# Patient Record
Sex: FEMALE | Race: WHITE | NOT HISPANIC OR LATINO | Employment: OTHER | URBAN - METROPOLITAN AREA
[De-identification: names, ages, dates, MRNs, and addresses within clinical notes are randomized per-mention and may not be internally consistent; named-entity substitution may affect disease eponyms.]

---

## 2017-01-02 ENCOUNTER — HOSPITAL ENCOUNTER (EMERGENCY)
Facility: MEDICAL CENTER | Age: 52
End: 2017-01-02
Attending: EMERGENCY MEDICINE
Payer: COMMERCIAL

## 2017-01-02 VITALS
BODY MASS INDEX: 22.58 KG/M2 | HEART RATE: 73 BPM | DIASTOLIC BLOOD PRESSURE: 84 MMHG | HEIGHT: 64 IN | SYSTOLIC BLOOD PRESSURE: 143 MMHG | TEMPERATURE: 97.5 F | OXYGEN SATURATION: 100 % | WEIGHT: 132.28 LBS | RESPIRATION RATE: 17 BRPM

## 2017-01-02 DIAGNOSIS — M75.82 ROTATOR CUFF TENDINITIS, LEFT: ICD-10-CM

## 2017-01-02 DIAGNOSIS — M75.50 SUBACROMIAL BURSITIS: ICD-10-CM

## 2017-01-02 PROCEDURE — 700101 HCHG RX REV CODE 250: Performed by: EMERGENCY MEDICINE

## 2017-01-02 PROCEDURE — 700111 HCHG RX REV CODE 636 W/ 250 OVERRIDE (IP): Performed by: EMERGENCY MEDICINE

## 2017-01-02 PROCEDURE — 99284 EMERGENCY DEPT VISIT MOD MDM: CPT

## 2017-01-02 PROCEDURE — 20610 DRAIN/INJ JOINT/BURSA W/O US: CPT

## 2017-01-02 RX ORDER — MELOXICAM 7.5 MG/1
7.5 TABLET ORAL DAILY
Status: SHIPPED | COMMUNITY
End: 2018-09-07

## 2017-01-02 RX ORDER — TOPIRAMATE 100 MG/1
200 TABLET, FILM COATED ORAL 2 TIMES DAILY
Status: SHIPPED | COMMUNITY
End: 2018-09-07

## 2017-01-02 RX ORDER — FROVATRIPTAN SUCCINATE 2.5 MG/1
1 TABLET, FILM COATED ORAL PRN
Status: SHIPPED | COMMUNITY
End: 2018-10-11 | Stop reason: SDUPTHER

## 2017-01-02 RX ORDER — RANITIDINE 150 MG/1
300 TABLET ORAL 2 TIMES DAILY
Status: SHIPPED | COMMUNITY
End: 2018-10-11 | Stop reason: SDUPTHER

## 2017-01-02 RX ORDER — TRIAMCINOLONE ACETONIDE 40 MG/ML
40 INJECTION, SUSPENSION INTRA-ARTICULAR; INTRAMUSCULAR ONCE
Status: COMPLETED | OUTPATIENT
Start: 2017-01-02 | End: 2017-01-02

## 2017-01-02 RX ORDER — FERROUS SULFATE 325(65) MG
325 TABLET ORAL DAILY
Status: SHIPPED | COMMUNITY
End: 2018-09-07

## 2017-01-02 RX ORDER — LIDOCAINE HYDROCHLORIDE AND EPINEPHRINE 10; 10 MG/ML; UG/ML
10 INJECTION, SOLUTION INFILTRATION; PERINEURAL ONCE
Status: COMPLETED | OUTPATIENT
Start: 2017-01-02 | End: 2017-01-02

## 2017-01-02 RX ORDER — EPINEPHRINE 0.3 MG/.3ML
0.3 INJECTION SUBCUTANEOUS ONCE
Status: SHIPPED | COMMUNITY
End: 2018-09-26 | Stop reason: CLARIF

## 2017-01-02 RX ORDER — ESTRADIOL 0.5 MG/1
0.5 TABLET ORAL DAILY
Status: SHIPPED | COMMUNITY
End: 2018-09-07

## 2017-01-02 RX ORDER — GABAPENTIN 300 MG/1
300 CAPSULE ORAL 3 TIMES DAILY
Status: SHIPPED | COMMUNITY
End: 2018-09-07

## 2017-01-02 RX ORDER — AMITRIPTYLINE HYDROCHLORIDE 25 MG/1
50 TABLET, FILM COATED ORAL NIGHTLY
Status: SHIPPED | COMMUNITY
End: 2018-09-07

## 2017-01-02 RX ORDER — TIZANIDINE 4 MG/1
4 TABLET ORAL EVERY 6 HOURS PRN
Status: SHIPPED | COMMUNITY
End: 2018-10-11 | Stop reason: SDUPTHER

## 2017-01-02 RX ORDER — PANTOPRAZOLE SODIUM 40 MG/1
40 TABLET, DELAYED RELEASE ORAL
Status: ON HOLD | COMMUNITY
End: 2018-09-28

## 2017-01-02 RX ORDER — CEVIMELINE HYDROCHLORIDE 30 MG/1
30 CAPSULE ORAL 3 TIMES DAILY
Status: SHIPPED | COMMUNITY
End: 2018-10-11 | Stop reason: SDUPTHER

## 2017-01-02 RX ADMIN — TRIAMCINOLONE ACETONIDE 40 MG: 40 INJECTION, SUSPENSION INTRA-ARTICULAR; INTRAMUSCULAR at 16:30

## 2017-01-02 RX ADMIN — LIDOCAINE HYDROCHLORIDE AND EPINEPHRINE 10 ML: 10; 10 INJECTION, SOLUTION INFILTRATION; PERINEURAL at 16:30

## 2017-01-02 ASSESSMENT — PAIN SCALES - GENERAL: PAINLEVEL_OUTOF10: 3

## 2017-01-02 NOTE — LETTER
"  FORM C-4:  EMPLOYEE’S CLAIM FOR COMPENSATION/ REPORT OF INITIAL TREATMENT  EMPLOYEE’S CLAIM - PROVIDE ALL INFORMATION REQUESTED   First Name  Donna Last Name  Poncho Birthdate             Age  1965 51 y.o. Sex  female Claim Number  [id number]   Home Employee Address  [address]  [place] [facility]                                     Zip  [address] Height  1.626 m (5' 4\") Weight  60 kg (132 lb 4.4 oz) [facility]     Mailing Employee Address                           [address]   [place] [facility]               Zip  [address] Telephone  [phone] (home)  Primary Language Spoken  ENGLISH   Insurer  [employer] [facility]  [address]  [place] [address] Third Party   [place] [employer] [facility] Employee's Occupation (Job Title) When Injury or Occupational Disease Occurred     Employer's Name  [employer]   Telephone   [phone]   Employer Address  [address]   [facility] Zip  [address]   Date of Injury  9/8/1999       Hour of Injury  2:00 PM Date Employer Notified  9/8/1999 Last Day of Work after Injury or Occupational Disease  5/3/2003 Supervisor to Whom Injury Reported  Mariza Ortiz   Address or Location of Accident (if applicable)  [[facility]]   What were you doing at the time of accident? (if applicable)  Placing a heavy box over head    How did this injury or occupational disease occur? Be specific and answer in detail. Use additional sheet if necessary)  Placing a heavy box over head   If you believe that you have an occupational disease, when did you first have knowledge of the disability and it relationship to your employment?  N/A Witnesses to the Accident  Racheletayler Mosqueda     Nature of Injury or Occupational Disease  Workers' Compensation  Part(s) of Body Injured or Affected  Spinal Cord - Neck, Shoulder (L), Shoulder (R)    I certify that the above is true and correct to the best of my knowledge and that I " have provided this information in order to obtain the benefits of [place]  I hereby authorize any physician, chiropractor, surgeon, practitioner, or other person, any hospital, including [facility] or [facility], any medical service organization, any insurance company, or other institution or organization to release to each other, any medical or other information, including benefits paid or payable, pertinent to this injury or disease, except information relative to diagnosis, treatment and/or counseling for AIDS, psychological conditions, alcohol or controlled substances, for which I must give specific authorization.  A Photostat of this authorization shall be as valid as the original.   Date  Jan 2, 2017 Place  [facility] Employee’s Signature   THIS REPORT MUST BE COMPLETED AND MAILED WITHIN 3 WORKING DAYS OF TREATMENT   Place  [facility], EMERGENCY DEPT  Name of Facility   [facility]   Date  1/2/2017 Diagnosis  (M75.82) Rotator cuff tendinitis, left  (M75.50) Subacromial bursitis Is there evidence the injured employee was under the influence of alcohol and/or another controlled substance at the time of accident?   Hour  5:32 PM Description of Injury or Disease  Rotator cuff tendinitis, left  Subacromial bursitis No   Treatment  Lidocaine, kenalog subacromial injection  sling  Have you advised the patient to remain off work five days or more?         No   X-Ray Findings  Negative  Comments:None.  MRI and xrays already completed in [facility]   If Yes   From Date    To Date      From information given by the employee, together with medical evidence, can you directly connect this injury or occupational disease as job incurred?    Comments:Unknown.  Adilia is retired and was not working when pain returned. If No, is the employee capable of: Full  "Duty  Yes Modified Duty      Is additional medical care by a physician indicated?  Yes If Modified Duty, Specify any Limitations / Restrictions        Do you know of any previous injury or disease contributing to this condition or occupational disease?  No   Date  1/2/2017 Print Doctor’s Name  Cipriano Larson certify the employer’s copy of this form was mailed on:   Address  [address] [place] [id number] [phone] Insurer’s Use Only   [facility]  [id number]    Provider’s Tax ID Number  [id number] Telephone  Dept: [phone]    Doctor’s Signature  e-CIPRIANO Sy M.D. Degree   MD    Original - TREATING PHYSICIAN OR CHIROPRACTOR   Pg 2-Insurer/TPA   Pg 3-Employer   Pg 4-Employee                                                                                                  Form C-4 (rev01/03)     BRIEF DESCRIPTION OF RIGHTS AND BENEFITS  (Pursuant to [place]RS 616C.050)    Notice of Injury or Occupational Disease (Incident Report Form C-1): If an injury or occupational disease (OD) arises out of and in the course of employment, you must provide written notice to your employer as soon as practicable, but no later than 7 days after the accident or OD. Your employer shall maintain a sufficient supply of the required forms.    Claim for Compensation (Form C-4): If medical treatment is sought, the form C-4 is available at the place of initial treatment. A completed \"Claim for Compensation\" (Form C-4) must be filed within 90 days after an accident or OD. The treating physician or chiropractor must, within 3 working days after treatment, complete and mail to the employer, the employer's insurer and third-party , the Claim for Compensation.    Medical Treatment: If you require medical treatment for your on-the-job injury or OD, you may be required to select a physician or chiropractor from a list provided by your workers’ compensation insurer, if it has contracted with an " Organization for Managed Care (MCO) or Preferred Provider Organization (PPO) or providers of health care. If your employer has not entered into a contract with an MCO or PPO, you may select a physician or chiropractor from the Panel of Physicians and Chiropractors. Any medical costs related to your industrial injury or OD will be paid by your insurer.    Temporary Total Disability (TTD): If your doctor has certified that you are unable to work for a period of at least 5 consecutive days, or 5 cumulative days in a 20-day period, or places restrictions on you that your employer does not accommodate, you may be entitled to TTD compensation.    Temporary Partial Disability (TPD): If the wage you receive upon reemployment is less than the compensation for TTD to which you are entitled, the insurer may be required to pay you TPD compensation to make up the difference. TPD can only be paid for a maximum of 24 months.    Permanent Partial Disability (PPD): When your medical condition is stable and there is an indication of a PPD as a result of your injury or OD, within 30 days, your insurer must arrange for an evaluation by a rating physician or chiropractor to determine the degree of your PPD. The amount of your PPD award depends on the date of injury, the results of the PPD evaluation and your age and wage.    Permanent Total Disability (PTD): If you are medically certified by a treating physician or chiropractor as permanently and totally disabled and have been granted a PTD status by your insurer, you are entitled to receive monthly benefits not to exceed 66 2/3% of your average monthly wage. The amount of your PTD payments is subject to reduction if you previously received a PPD award.    Vocational Rehabilitation Services: You may be eligible for vocational rehabilitation services if you are unable to return to the job due to a permanent physical impairment or permanent restrictions as a result of your injury or  occupational disease.    Transportation and Per Elise Reimbursement: You may be eligible for travel expenses and per elise associated with medical treatment.  Reopening: You may be able to reopen your claim if your condition worsens after claim closure.    Appeal Process: If you disagree with a written determination issued by the insurer or the insurer does not respond to your request, you may appeal to the Department of Administration, , by following the instructions contained in your determination letter. You must appeal the determination within 70 days from the date of the determination letter at [address], [address], [place], [place] [address], or [address], [address], [place], [place] [address]. If you disagree with the  decision, you may appeal to the Department of Administration, . You must file your appeal within 30 days from the date of the  decision letter at [address], [address], [place], [place] [address], or [address], [place], [place] [address]. If you disagree with a decision of an , you may file a petition for judicial review with the District Court. You must do so within 30 days of the Appeal Officer’s decision. You may be represented by an  at your own expense or you may contact the [facility] for possible representation.    [place]  for Injured Workers (NAIW): If you disagree with a  decision, you may request that NAIW represent you without charge at an  Hearing. For information regarding denial of benefits, you may contact the [facility] at: [address], [address], [place] [address], [phone]) [phone], or [address], [place] [address], [phone]    To File a Complaint with the Division: If you wish to file a complaint with the  of the Division of Industrial Relations (DIR), please contact  the Workers’ Compensation Section, [address], [address], [place], [place] [address], telephone (954) 783-1783, or [address], [address], [place], [place] [address], telephone [phone]) [phone].    For assistance with Workers’ Compensation Issues: you may contact the Office of the [facility] Consumer Health Assistance, [address] [place] [address], [address], [place], [place] [address], Toll Free 1-159.117.6940, Web site: [email or web address].[email or web address]., E-mail [email or web address].                                                                                                                                                                               __________________________________________________________________                                    _______________            Employee Name / Signature                                                                                                                            Date                                       D-2 (rev. 10/07)

## 2017-01-02 NOTE — LETTER
January 29, 2019    To Whom It May Concern:         This is confirmation that Poncho Engel date of birth 1965  attended her scheduled appointment on 1/28/2019 for medical clearance for scheduled left-sided shoulder surgery.    -Reviewed patient's medical records, patient has history of stress cardiomyopathy, chronic kidney disease, congestive heart failure.  Most recent stress test on 1/22/2019 is normal and echocardiogram of the heart showed normal EF. Patient has been recently seen by Nephrologist for CKD stage 3. Most recent labs showed normal BUN/creatinine, GFR is 53.  Discussed with patient regarding risks benefits of the surgery, verbalized understanding.  Patient is medically cleared and is a low risk for surgery.         If you have any questions please do not hesitate to call me at the phone number listed below.    Sincerely,          Dr.Deepthi Morales  [address] [place] [address]. [address] [place] [address], [place] [address]

## 2017-01-02 NOTE — ED AVS SNAPSHOT
logolineup Access Code: [email or web address]  Expires: 2/1/2017  5:42 PM    logolineup  A secure, online tool to manage your health information     [employer]’s logolineup® is a secure, online tool that connects you to your personalized health information from the privacy of your home -- day or night - making it very easy for you to manage your healthcare. Once the activation process is completed, you can even access your medical information using the logolineup alanis, which is available for free in the Apple Alanis store or Google Play store.     logolineup provides the following levels of access (as shown below):   My Chart Features   [facility] Primary Care Doctor [facility]  Specialists [facility]  Urgent  Care Non-[facility]  Primary Care  Doctor   Email your healthcare team securely and privately 24/7 X X X X   Manage appointments: schedule your next appointment; view details of past/upcoming appointments X      Request prescription refills. X      View recent personal medical records, including lab and immunizations X X X X   View health record, including health history, allergies, medications X X X X   Read reports about your outpatient visits, procedures, consult and ER notes X X X X   See your discharge summary, which is a recap of your hospital and/or ER visit that includes your diagnosis, lab results, and care plan. X X       How to register for logolineup:  1. Go to  [email or web address].  2. Click on the Sign Up Now box, which takes you to the New Member Sign Up page. You will need to provide the following information:  a. Enter your logolineup Access Code exactly as it appears at the top of this page. (You will not need to use this code after you’ve completed the sign-up process. If you do not sign up before the expiration date, you must request a new code.)   b. Enter your date of birth.   c. Enter your home email address.   d. Click Submit, and follow the next screen’s instructions.  3. Create a logolineup ID. This will be your logolineup  login ID and cannot be changed, so think of one that is secure and easy to remember.  4. Create a Quture password. You can change your password at any time.  5. Enter your Password Reset Question and Answer. This can be used at a later time if you forget your password.   6. Enter your e-mail address. This allows you to receive e-mail notifications when new information is available in Quture.  7. Click Sign Up. You can now view your health information.    For assistance activating your Quture account, call [phone]

## 2017-01-02 NOTE — ED NOTES
Presents complaining of exacerbation of chronic left arm pain and neck pain.  Seen by her PCP for same. MRI results are pending.

## 2017-01-02 NOTE — ED AVS SNAPSHOT
1/2/2017          Donna Isaac   [address]  [place] [address]    Dear Donna:    [facility] wants to ensure your discharge home is safe and you or your loved ones have had all your questions answered regarding your care after you leave the hospital.    You may receive a telephone call within two days of your discharge.  This call is to make certain you understand your discharge instructions as well as ensure we provided you with the best care possible during your stay with us.     The call will only last approximately 3-5 minutes and will be done by a nurse.    Once again, we want to ensure your discharge home is safe and that you have a clear understanding of any next steps in your care.  If you have any questions or concerns, please do not hesitate to contact us, we are here for you.  Thank you for choosing [facility] for your healthcare needs.    Sincerely,    Rubens Arroyo    [facility]

## 2017-01-02 NOTE — ED AVS SNAPSHOT
After Visit Summary                                                                                                                Donna Isaac   MRN: [id number]    Department:  [facility], Emergency Dept   Date of Visit:  1/2/2017            [facility], Emergency Dept    [address]    [place] [address]    Phone:  [phone]      You were seen by     Benson Larson M.D.      Your Diagnosis Was     Rotator cuff tendinitis, left     M75.82       These are the medications you received during your hospitalization from 01/02/2017 1236 to 01/02/2017 1742     Date/Time Order Dose Route Action    01/02/2017 1630 triamcinolone acetonide (KENALOG-40) injection 40 mg 40 mg Intramuscular Given    01/02/2017 1630 lidocaine-epinephrine 1 %-1:[id number] injection 10 mL 10 mL Injection Given      Follow-up Information     1. Schedule an appointment as soon as possible for a visit with Your Physician.    Specialty:  Emergency Medicine    Why:  As needed    Contact information    Varies        Medication Information     Review all of your home medications and newly ordered medications with your primary doctor and/or pharmacist as soon as possible. Follow medication instructions as directed by your doctor and/or pharmacist.     Please keep your complete medication list with you and share with your physician. Update the information when medications are discontinued, doses are changed, or new medications (including over-the-counter products) are added; and carry medication information at all times in the event of emergency situations.               Medication List      ASK your doctor about these medications        Instructions    amitriptyline 25 MG Tabs   Commonly known as:  ELAVIL    Take 50 mg by mouth every evening.   Dose:  50 mg       aspirin EC 81 MG Tbec   Commonly known as:  ECOTRIN    Take 162 mg by mouth every evening.   Dose:  162 mg       CALCIUM SOFT CHEWS PO    Take 1 Tab by mouth every day.   Dose:  1 Tab       cevimeline 30 MG capsule   Commonly known as:  EVOXAC    Take 30 mg by mouth 3 times a day.   Dose:  30 mg       EPIPEN 2-JOSSE 0.3 MG/0.3ML Soaj solution for injection   Generic drug:  EPINEPHrine    0.3 mg by Intramuscular route Once.   Dose:  0.3 mg       estradiol 0.5 MG tablet   Commonly known as:  ESTRACE    Take 0.5 mg by mouth every day.   Dose:  0.5 mg       ferrous sulfate 325 (65 FE) MG tablet    Take 325 mg by mouth every day.   Dose:  325 mg       FROVA 2.5 MG Tabs   Generic drug:  Frovatriptan Succinate    Take 1 Tab by mouth as needed. Indications: Migraine Headache   Dose:  1 Tab       gabapentin 300 MG Caps   Commonly known as:  NEURONTIN    Take 300 mg by mouth 3 times a day.   Dose:  300 mg       meloxicam 7.5 MG Tabs   Commonly known as:  MOBIC    Take 7.5 mg by mouth every day.   Dose:  7.5 mg       multivitamin Tabs    Take 1 Tab by mouth every day.   Dose:  1 Tab       pantoprazole 40 MG Tbec   Commonly known as:  PROTONIX    Take 40 mg by mouth every bedtime.   Dose:  40 mg       ranitidine 150 MG Tabs   Commonly known as:  ZANTAC    Take 150 mg by mouth every day.   Dose:  150 mg       tizanidine 4 MG Tabs   Commonly known as:  ZANAFLEX    Take 8 mg by mouth every bedtime.   Dose:  8 mg       topiramate 100 MG Tabs   Commonly known as:  TOPAMAX    Take 200 mg by mouth 2 times a day.   Dose:  200 mg       VITAMIN D PO    Take 1 Cap by mouth every day.   Dose:  1 Cap               Procedures and tests performed during your visit     SLING        Discharge Instructions       Rotator Cuff Tendinitis  Continue mobic. Ice the shoulder. Use the sling up to 3 days. After 3 days start range of motion exercises as discussed. Follow up with her physician during business hours to obtain the results of your MRI.     Rotator cuff tendinitis is inflammation of the tough, cord-like bands that connect muscle to bone (tendons) in your rotator cuff. Your  rotator cuff is the collection of all the muscles and tendons that connect your arm to your shoulder. Your rotator cuff holds the head of your upper arm bone (humerus) in the cup (fossa) of your shoulder blade (scapula).  CAUSES  Rotator cuff tendinitis is usually caused by overusing the joint involved.   SIGNS AND SYMPTOMS  · Deep ache in the shoulder also felt on the outside upper arm over the shoulder muscle.  · Point tenderness over the area that is injured.  · Pain comes on gradually and becomes worse with lifting the arm to the side (abduction) or turning it inward (internal rotation).  · May lead to a chronic tear: When a rotator cuff tendon becomes inflamed, it runs the risk of losing its blood supply, causing some tendon fibers to die. This increases the risk that the tendon can fray and partially or completely tear.  DIAGNOSIS  Rotator cuff tendinitis is diagnosed by taking a medical history, performing a physical exam, and reviewing results of imaging exams. The medical history is useful to help determine the type of rotator cuff injury. The physical exam will include looking at the injured shoulder, feeling the injured area, and watching you do range-of-motion exercises. X-ray exams are typically done to rule out other causes of shoulder pain, such as fractures. MRI is the imaging exam usually used for significant shoulder injuries. Sometimes a dye study called CT arthrogram is done, but it is not as widely used as MRI. In some institutions, special ultrasound tests may also be used to aid in the diagnosis.  TREATMENT   Less Severe Cases  · Use of a sling to rest the shoulder for a short period of time. Prolonged use of the sling can cause stiffness, weakness, and loss of motion of the shoulder joint.  · Anti-inflammatory medicines, such as ibuprofen or naproxen sodium, may be prescribed.  More Severe Cases  · Physical therapy.  · Use of steroid injections into the shoulder joint.  · Surgery.  HOME CARE  INSTRUCTIONS   · Use a sling or splint until the pain decreases. Prolonged use of the sling can cause stiffness, weakness, and loss of motion of the shoulder joint.  · Apply ice to the injured area:  ¨ Put ice in a plastic bag.  ¨ Place a towel between your skin and the bag.  ¨ Leave the ice on for 20 minutes, 2-3 times a day.  · Try to avoid use other than gentle range of motion while your shoulder is painful. Use the shoulder and exercise only as directed by your health care provider. Stop exercises or range of motion if pain or discomfort increases, unless directed otherwise by your health care provider.  · Only take over-the-counter or prescription medicines for pain, discomfort, or fever as directed by your health care provider.  · If you were given a shoulder sling and straps (immobilizer), do not remove it except as directed, or until you see a health care provider for a follow-up exam. If you need to remove it, move your arm as little as possible or as directed.  · You may want to sleep on several pillows at night to lessen swelling and pain.  SEEK IMMEDIATE MEDICAL CARE IF:   · Your shoulder pain increases or new pain develops in your arm, hand, or fingers and is not relieved with medicines.  · You have new, unexplained symptoms, especially increased numbness in the hands or loss of strength.  · You develop any worsening of the problems that brought you in for care.  · Your arm, hand, or fingers are numb or tingling.  · Your arm, hand, or fingers are swollen, painful, or turn white or blue.  MAKE SURE YOU:  · Understand these instructions.  · Will watch your condition.  · Will get help right away if you are not doing well or get worse.     This information is not intended to replace advice given to you by your health care provider. Make sure you discuss any questions you have with your health care provider.     Document Released: 03/09/2005 Document Revised: 01/08/2016 Document Reviewed:  07/30/2014  Elsevier Interactive Patient Education ©2016 [employer]            Patient Information     Patient Information    Following emergency treatment: all patient requiring follow-up care must return either to a private physician or a clinic if your condition worsens before you are able to obtain further medical attention, please return to the emergency room.     Billing Information    At [facility], we work to make the billing process streamlined for our patients.  Our Representatives are here to answer any questions you may have regarding your hospital bill.  If you have insurance coverage and have supplied your insurance information to us, we will submit a claim to your insurer on your behalf.  Should you have any questions regarding your bill, we can be reached online or by phone as follows:  Online: You are able pay your bills online or live chat with our representatives about any billing questions you may have. We are here to help Monday - Friday from 8:00am to 7:30pm and 9:00am - 12:00pm on Saturdays.  Please visit [email or web address]  for more information.   Phone:  [phone] or [phone]    Please note that your emergency physician, surgeon, pathologist, radiologist, anesthesiologist, and other specialists are not employed by [facility] and will therefore bill separately for their services.  Please contact them directly for any questions concerning their bills at the numbers below:     Emergency Physician Services:  [phone]  [facility]:  [phone]  [facility]:  [phone]  [facility]:  [phone]    1. Your final bill may vary from the amount quoted upon discharge if all procedures are not complete at that time, or if your doctor has additional procedures of which we are not aware. You will receive an additional bill if you return to the Emergency Department at [facility] for suture removal  regardless of the facility of which the sutures were placed.     2. Please arrange for settlement of this account at the emergency registration.    3. All self-pay accounts are due in full at the time of treatment.  If you are unable to meet this obligation then payment is expected within 4-5 days.     4. If you have had radiology studies (CT, X-ray, Ultrasound, MRI), you have received a preliminary result during your emergency department visit. Please contact the radiology department [phone] to receive a copy of your final result. Please discuss the Final result with your primary physician or with the follow up physician provided.     Crisis Hotline:  [place] Crisis Hotline:  4-472-HCUKNTF or 1-985.153.2945  [place] Crisis Hotline:    1-275.336.9011 or 957-264-8490         ED Discharge Follow Up Questions    1. In order to provide you with very good care, we would like to follow up with a phone call in the next few days.  May we have your permission to contact you?     YES /  NO    2. What is the best phone number to call you? (       )_____-__________    3. What is the best time to call you?      Morning  /  Afternoon  /  Evening                   Patient Signature:  ____________________________________________________________    Date:  ____________________________________________________________

## 2017-01-03 NOTE — ED NOTES
"Pt has chronic pain to L shoulder/neck from workplace injury in 1999. Reports waking up with \"frozen L shoulder\", pain has been steadily increasing since. Denies numbness, tingling, +distal csm. Pt's L shoulder ROM has increased following injection.   "

## 2017-01-03 NOTE — ED NOTES
Patient verbalized understanding of discharge instructions including order not to drive today, no questions at this time. Pt to follow up with PCP to discuss BP. VS stable, patient will ambulate to exit with d/c instructions in hand.

## 2017-01-03 NOTE — ED NOTES
Med Rec completed per patient  Allergies reviewed  No ORAL antibiotics in last 30 days    Patient used her Epipen 12-31-16, not sure what she was allergic to

## 2017-01-03 NOTE — ED NOTES
Pt dressed, sling applied, +distals csm before and after sling applied. Pt updated we are awaiting workmans comp paperwork to be completed for d/c.

## 2017-01-03 NOTE — DISCHARGE INSTRUCTIONS
Rotator Cuff Tendinitis  Continue mobic. Ice the shoulder. Use the sling up to 3 days. After 3 days start range of motion exercises as discussed. Follow up with her physician during business hours to obtain the results of your MRI.     Rotator cuff tendinitis is inflammation of the tough, cord-like bands that connect muscle to bone (tendons) in your rotator cuff. Your rotator cuff is the collection of all the muscles and tendons that connect your arm to your shoulder. Your rotator cuff holds the head of your upper arm bone (humerus) in the cup (fossa) of your shoulder blade (scapula).  CAUSES  Rotator cuff tendinitis is usually caused by overusing the joint involved.   SIGNS AND SYMPTOMS  · Deep ache in the shoulder also felt on the outside upper arm over the shoulder muscle.  · Point tenderness over the area that is injured.  · Pain comes on gradually and becomes worse with lifting the arm to the side (abduction) or turning it inward (internal rotation).  · May lead to a chronic tear: When a rotator cuff tendon becomes inflamed, it runs the risk of losing its blood supply, causing some tendon fibers to die. This increases the risk that the tendon can fray and partially or completely tear.  DIAGNOSIS  Rotator cuff tendinitis is diagnosed by taking a medical history, performing a physical exam, and reviewing results of imaging exams. The medical history is useful to help determine the type of rotator cuff injury. The physical exam will include looking at the injured shoulder, feeling the injured area, and watching you do range-of-motion exercises. X-ray exams are typically done to rule out other causes of shoulder pain, such as fractures. MRI is the imaging exam usually used for significant shoulder injuries. Sometimes a dye study called CT arthrogram is done, but it is not as widely used as MRI. In some institutions, special ultrasound tests may also be used to aid in the diagnosis.  TREATMENT   Less Severe  Cases  · Use of a sling to rest the shoulder for a short period of time. Prolonged use of the sling can cause stiffness, weakness, and loss of motion of the shoulder joint.  · Anti-inflammatory medicines, such as ibuprofen or naproxen sodium, may be prescribed.  More Severe Cases  · Physical therapy.  · Use of steroid injections into the shoulder joint.  · Surgery.  HOME CARE INSTRUCTIONS   · Use a sling or splint until the pain decreases. Prolonged use of the sling can cause stiffness, weakness, and loss of motion of the shoulder joint.  · Apply ice to the injured area:  ¨ Put ice in a plastic bag.  ¨ Place a towel between your skin and the bag.  ¨ Leave the ice on for 20 minutes, 2-3 times a day.  · Try to avoid use other than gentle range of motion while your shoulder is painful. Use the shoulder and exercise only as directed by your health care provider. Stop exercises or range of motion if pain or discomfort increases, unless directed otherwise by your health care provider.  · Only take over-the-counter or prescription medicines for pain, discomfort, or fever as directed by your health care provider.  · If you were given a shoulder sling and straps (immobilizer), do not remove it except as directed, or until you see a health care provider for a follow-up exam. If you need to remove it, move your arm as little as possible or as directed.  · You may want to sleep on several pillows at night to lessen swelling and pain.  SEEK IMMEDIATE MEDICAL CARE IF:   · Your shoulder pain increases or new pain develops in your arm, hand, or fingers and is not relieved with medicines.  · You have new, unexplained symptoms, especially increased numbness in the hands or loss of strength.  · You develop any worsening of the problems that brought you in for care.  · Your arm, hand, or fingers are numb or tingling.  · Your arm, hand, or fingers are swollen, painful, or turn white or blue.  MAKE SURE YOU:  · Understand these  instructions.  · Will watch your condition.  · Will get help right away if you are not doing well or get worse.     This information is not intended to replace advice given to you by your health care provider. Make sure you discuss any questions you have with your health care provider.     Document Released: 03/09/2005 Document Revised: 01/08/2016 Document Reviewed: 07/30/2014  [employer] Interactive Patient Education ©2016 [employer] Inc[employer]

## 2017-01-03 NOTE — ED PROVIDER NOTES
"ED Provider Note    CHIEF COMPLAINT   Chief Complaint   Patient presents with   • Shoulder Pain   • Neck Pain   • Headache       HPI   Donna Isaac is a 51 y.o. female who presents for severe left shoulder pain for the last 3 days with greatly decreased range of motion. Patient had pain for 3-4 months and that it worsened without trauma. She had an MRI in [facility] on 23 December of both shoulders but does not know the results. She will not see this orthopedist until she returns from [place] in 6 months. No weakness or numbness. The pain does radiate now towards the neck and causes a headache. Remote work-related injury and left rotator cuff repair and right shoulder surgery as well. Patient had recent normal x-rays of the shoulder. She has not had a steroid injection.    REVIEW OF SYSTEMS   Pertinent positives: To be her left shoulder pain and decreased range of motion.  Pertinent negatives: Weakness, numbness.    PAST MEDICAL HISTORY   Prior remote left rotator cuff injury work-related    PAST SURGICAL HISTORY  Bilateral shoulder surgery    CURRENT MEDICATIONS   Mode pick daily low-dose and tolerates no other anti-inflammatories.    ALLERGIES   Allergies   Allergen Reactions   • Bee Venom Anaphylaxis   • Contrast Media With Iodine [Iodine] Shortness of Breath   • Econazole Anaphylaxis   • Floxin [Ofloxacin] Anaphylaxis   • Keflex Shortness of Breath   • Levaquin Shortness of Breath   • Nitrofurantoin Shortness of Breath     ...and hives     • Oxycodone Shortness of Breath     ...and hives     • Penicillins Shortness of Breath     ...and hives     • Ancef [Cefazolin] Hives   • Bactrim [Sulfamethoxazole W-Trimethoprim] Shortness of Breath   • Bextra [Valdecoxib] Rash     \"Skin burn and peeling.\"   • Other Drug Hives     \"Enconazole nitrate.\" shortness of breath and hives   • Other Misc Anaphylaxis     Adhesive tape: blisters   • Morphine Anaphylaxis   • Norco [Apap-Fd&C Yellow #10 Al Tai-Hydrocodone] " "Shortness of Breath     ...and hives         PHYSICAL EXAM  VITAL SIGNS: /63 mmHg  Pulse 80  Temp(Src) 36.4 °C (97.5 °F)  Resp 18  Ht 1.626 m (5' 4\")  Wt 60 kg (132 lb 4.4 oz)  BMI 22.69 kg/m2  SpO2 100%  Constitutional: Well developed, Well nourished, well appearing.  HENT: Atraumatic.   Cardiovascular:  Peripheral pulses are 2+.  Skin: No rash.   Musculoskeletal: Tenderness around the glenohumeral joint consistent with rotator cuff pathology. Greatly decreased range of motion in abduction and internal and external rotation. No tenderness of the clavicle cervical spine. No pain with axial loading of the cervical spine.  Compartments: Soft  Neurologic: Wrist extension, flexion, finger abduction, and thumb opposition, biceps, triceps and shoulder abduction are 5/5 bilateral.    Sensation is intact on the pad of the first and fifth finger, over the first dorsal web space of the hand, And over the deltoid.         RADIOLOGY/PROCEDURES  We attempted to obtain MRI results of the shoulder but the imaging center was closed.    COURSE & MEDICAL DECISION MAKING  Seizure: Subacromial bursa injection with triamcinolone. Skin was prepped with an alcohol wipe. The skin was anesthetized with 1-1/2 mL 1% lidocaine with epinephrine. 40 mg triamcinolone and 7 mL of 1% lidocaine with epinephrine were instilled in the subacromial space. Patient tolerated procedure well.    Hearing patient presents with a left rotator cuff tendinitis with a probable subacromial bursitis. I doubt this is a radicular pain from the neck. The patient is artery had normal radiographs and no history of injury.    This patient has borderline or elevated blood pressure as recorded above and was instructed to followup with primary physician for comprehensive blood pressure evaluation and yearly fasting cholesterol assessment according to to CMS protocol.      PLAN:  Sling for 3 days  Range of motion exercises  Rotator cuff tendinitis " handout  Continue motor vehicle  [place]  Follow-up with your physician for MRI results and further care as needed  Worker's compensation form completed since patient reported a left shoulder injury work-related many years ago    CONDITION: good    FINAL IMPRESSION  1. Rotator cuff tendinitis, left    2. Subacromial bursitis    3.      Right subacromial bursa injection        Electronically signed by: Benson Larson, 1/2/2017 4:02 PM

## 2018-09-07 ENCOUNTER — APPOINTMENT (OUTPATIENT)
Dept: RADIOLOGY | Facility: MEDICAL CENTER | Age: 53
End: 2018-09-07
Attending: EMERGENCY MEDICINE
Payer: MEDICARE

## 2018-09-07 ENCOUNTER — HOSPITAL ENCOUNTER (EMERGENCY)
Facility: MEDICAL CENTER | Age: 53
End: 2018-09-07
Attending: EMERGENCY MEDICINE
Payer: MEDICARE

## 2018-09-07 VITALS
HEART RATE: 85 BPM | HEIGHT: 64 IN | RESPIRATION RATE: 16 BRPM | OXYGEN SATURATION: 93 % | BODY MASS INDEX: 30.11 KG/M2 | SYSTOLIC BLOOD PRESSURE: 184 MMHG | WEIGHT: 176.37 LBS | DIASTOLIC BLOOD PRESSURE: 129 MMHG | TEMPERATURE: 97.8 F

## 2018-09-07 DIAGNOSIS — R11.2 NON-INTRACTABLE VOMITING WITH NAUSEA, UNSPECIFIED VOMITING TYPE: ICD-10-CM

## 2018-09-07 DIAGNOSIS — R10.33 PERIUMBILICAL ABDOMINAL PAIN: ICD-10-CM

## 2018-09-07 LAB
ABO GROUP BLD: NORMAL
ABO GROUP BLD: NORMAL
ALBUMIN SERPL BCP-MCNC: 3.9 G/DL (ref 3.2–4.9)
ALBUMIN/GLOB SERPL: 1.5 G/DL
ALP SERPL-CCNC: 68 U/L (ref 30–99)
ALT SERPL-CCNC: 21 U/L (ref 2–50)
ANION GAP SERPL CALC-SCNC: 10 MMOL/L (ref 0–11.9)
APPEARANCE UR: CLEAR
AST SERPL-CCNC: 29 U/L (ref 12–45)
BASOPHILS # BLD AUTO: 0.8 % (ref 0–1.8)
BASOPHILS # BLD: 0.04 K/UL (ref 0–0.12)
BILIRUB SERPL-MCNC: 0.6 MG/DL (ref 0.1–1.5)
BILIRUB UR QL STRIP.AUTO: NEGATIVE
BLD GP AB SCN SERPL QL: NORMAL
BUN SERPL-MCNC: 17 MG/DL (ref 8–22)
CALCIUM SERPL-MCNC: 9.1 MG/DL (ref 8.4–10.2)
CHLORIDE SERPL-SCNC: 103 MMOL/L (ref 96–112)
CO2 SERPL-SCNC: 27 MMOL/L (ref 20–33)
COLOR UR: YELLOW
CREAT SERPL-MCNC: 0.94 MG/DL (ref 0.5–1.4)
EKG IMPRESSION: NORMAL
EOSINOPHIL # BLD AUTO: 0.15 K/UL (ref 0–0.51)
EOSINOPHIL NFR BLD: 3 % (ref 0–6.9)
ERYTHROCYTE [DISTWIDTH] IN BLOOD BY AUTOMATED COUNT: 45.6 FL (ref 35.9–50)
GLOBULIN SER CALC-MCNC: 2.6 G/DL (ref 1.9–3.5)
GLUCOSE SERPL-MCNC: 105 MG/DL (ref 65–99)
GLUCOSE UR STRIP.AUTO-MCNC: NEGATIVE MG/DL
HCT VFR BLD AUTO: 43.5 % (ref 37–47)
HGB BLD-MCNC: 14.4 G/DL (ref 12–16)
IMM GRANULOCYTES # BLD AUTO: 0.01 K/UL (ref 0–0.11)
IMM GRANULOCYTES NFR BLD AUTO: 0.2 % (ref 0–0.9)
INR PPP: 0.95 (ref 0.87–1.13)
KETONES UR STRIP.AUTO-MCNC: NEGATIVE MG/DL
LEUKOCYTE ESTERASE UR QL STRIP.AUTO: NEGATIVE
LIPASE SERPL-CCNC: 31 U/L (ref 7–58)
LYMPHOCYTES # BLD AUTO: 1.21 K/UL (ref 1–4.8)
LYMPHOCYTES NFR BLD: 24.1 % (ref 22–41)
MCH RBC QN AUTO: 29.3 PG (ref 27–33)
MCHC RBC AUTO-ENTMCNC: 33.1 G/DL (ref 33.6–35)
MCV RBC AUTO: 88.4 FL (ref 81.4–97.8)
MICRO URNS: NORMAL
MONOCYTES # BLD AUTO: 0.47 K/UL (ref 0–0.85)
MONOCYTES NFR BLD AUTO: 9.3 % (ref 0–13.4)
NEUTROPHILS # BLD AUTO: 3.15 K/UL (ref 2–7.15)
NEUTROPHILS NFR BLD: 62.6 % (ref 44–72)
NITRITE UR QL STRIP.AUTO: NEGATIVE
NRBC # BLD AUTO: 0 K/UL
NRBC BLD-RTO: 0 /100 WBC
PH UR STRIP.AUTO: 7 [PH]
PLATELET # BLD AUTO: 242 K/UL (ref 164–446)
PMV BLD AUTO: 10.8 FL (ref 9–12.9)
POTASSIUM SERPL-SCNC: 3.6 MMOL/L (ref 3.6–5.5)
PROT SERPL-MCNC: 6.5 G/DL (ref 6–8.2)
PROT UR QL STRIP: NEGATIVE MG/DL
PROTHROMBIN TIME: 12.6 SEC (ref 12–14.6)
RBC # BLD AUTO: 4.92 M/UL (ref 4.2–5.4)
RBC UR QL AUTO: NEGATIVE
RH BLD: NORMAL
RH BLD: NORMAL
SODIUM SERPL-SCNC: 140 MMOL/L (ref 135–145)
SP GR UR STRIP.AUTO: 1.01
WBC # BLD AUTO: 5 K/UL (ref 4.8–10.8)

## 2018-09-07 PROCEDURE — 86850 RBC ANTIBODY SCREEN: CPT

## 2018-09-07 PROCEDURE — 85610 PROTHROMBIN TIME: CPT

## 2018-09-07 PROCEDURE — 85025 COMPLETE CBC W/AUTO DIFF WBC: CPT

## 2018-09-07 PROCEDURE — 81003 URINALYSIS AUTO W/O SCOPE: CPT

## 2018-09-07 PROCEDURE — 80053 COMPREHEN METABOLIC PANEL: CPT

## 2018-09-07 PROCEDURE — 96375 TX/PRO/DX INJ NEW DRUG ADDON: CPT

## 2018-09-07 PROCEDURE — 86901 BLOOD TYPING SEROLOGIC RH(D): CPT

## 2018-09-07 PROCEDURE — 36415 COLL VENOUS BLD VENIPUNCTURE: CPT

## 2018-09-07 PROCEDURE — 86900 BLOOD TYPING SEROLOGIC ABO: CPT

## 2018-09-07 PROCEDURE — 96374 THER/PROPH/DIAG INJ IV PUSH: CPT

## 2018-09-07 PROCEDURE — 700111 HCHG RX REV CODE 636 W/ 250 OVERRIDE (IP): Performed by: EMERGENCY MEDICINE

## 2018-09-07 PROCEDURE — 96376 TX/PRO/DX INJ SAME DRUG ADON: CPT

## 2018-09-07 PROCEDURE — 93005 ELECTROCARDIOGRAM TRACING: CPT | Performed by: EMERGENCY MEDICINE

## 2018-09-07 PROCEDURE — 83690 ASSAY OF LIPASE: CPT

## 2018-09-07 PROCEDURE — 99285 EMERGENCY DEPT VISIT HI MDM: CPT

## 2018-09-07 PROCEDURE — 700117 HCHG RX CONTRAST REV CODE 255: Performed by: EMERGENCY MEDICINE

## 2018-09-07 PROCEDURE — 74177 CT ABD & PELVIS W/CONTRAST: CPT

## 2018-09-07 RX ORDER — DULOXETIN HYDROCHLORIDE 60 MG/1
60 CAPSULE, DELAYED RELEASE ORAL DAILY
Status: SHIPPED | COMMUNITY
End: 2018-10-11 | Stop reason: SDUPTHER

## 2018-09-07 RX ORDER — MULTIVIT-MIN/IRON/FOLIC ACID/K 18-600-40
2000 CAPSULE ORAL DAILY
Status: SHIPPED | COMMUNITY
End: 2020-09-28

## 2018-09-07 RX ORDER — MONTELUKAST SODIUM 10 MG/1
10 TABLET ORAL DAILY
Status: SHIPPED | COMMUNITY
End: 2018-10-11 | Stop reason: SDUPTHER

## 2018-09-07 RX ORDER — CETIRIZINE HYDROCHLORIDE 10 MG/1
20 TABLET ORAL 2 TIMES DAILY
Status: SHIPPED | COMMUNITY
End: 2018-09-26 | Stop reason: CLARIF

## 2018-09-07 RX ORDER — ONDANSETRON 2 MG/ML
4 INJECTION INTRAMUSCULAR; INTRAVENOUS
Status: COMPLETED | OUTPATIENT
Start: 2018-09-07 | End: 2018-09-07

## 2018-09-07 RX ORDER — GABAPENTIN 400 MG/1
400 CAPSULE ORAL 3 TIMES DAILY
Status: SHIPPED | COMMUNITY
End: 2018-10-11 | Stop reason: SDUPTHER

## 2018-09-07 RX ORDER — LANOLIN ALCOHOL/MO/W.PET/CERES
1000 CREAM (GRAM) TOPICAL
Status: SHIPPED | COMMUNITY
End: 2019-05-13

## 2018-09-07 RX ORDER — DIPHENHYDRAMINE HYDROCHLORIDE 50 MG/ML
50 INJECTION INTRAMUSCULAR; INTRAVENOUS ONCE
Status: COMPLETED | OUTPATIENT
Start: 2018-09-07 | End: 2018-09-07

## 2018-09-07 RX ORDER — MAGNESIUM OXIDE 400 MG/1
400 TABLET ORAL EVERY EVENING
Status: SHIPPED | COMMUNITY
End: 2020-09-28

## 2018-09-07 RX ORDER — ONDANSETRON 2 MG/ML
4 INJECTION INTRAMUSCULAR; INTRAVENOUS
Status: DISCONTINUED | OUTPATIENT
Start: 2018-09-07 | End: 2018-09-07 | Stop reason: HOSPADM

## 2018-09-07 RX ORDER — METHYLPREDNISOLONE SODIUM SUCCINATE 125 MG/2ML
125 INJECTION, POWDER, LYOPHILIZED, FOR SOLUTION INTRAMUSCULAR; INTRAVENOUS ONCE
Status: COMPLETED | OUTPATIENT
Start: 2018-09-07 | End: 2018-09-07

## 2018-09-07 RX ORDER — MELOXICAM 15 MG/1
15 TABLET ORAL DAILY
Status: SHIPPED | COMMUNITY
End: 2018-10-11 | Stop reason: SDUPTHER

## 2018-09-07 RX ORDER — ASCORBIC ACID 125 MG
1 TABLET,CHEWABLE ORAL DAILY
Status: SHIPPED | COMMUNITY
End: 2020-06-28

## 2018-09-07 RX ORDER — ONDANSETRON 4 MG/1
4 TABLET, ORALLY DISINTEGRATING ORAL EVERY 8 HOURS PRN
Qty: 10 TAB | Refills: 0 | Status: SHIPPED | OUTPATIENT
Start: 2018-09-07 | End: 2018-09-26 | Stop reason: CLARIF

## 2018-09-07 RX ADMIN — ONDANSETRON 4 MG: 2 INJECTION INTRAMUSCULAR; INTRAVENOUS at 13:05

## 2018-09-07 RX ADMIN — IOHEXOL 100 ML: 350 INJECTION, SOLUTION INTRAVENOUS at 12:21

## 2018-09-07 RX ADMIN — DIPHENHYDRAMINE HYDROCHLORIDE 50 MG: 50 INJECTION INTRAMUSCULAR; INTRAVENOUS at 10:29

## 2018-09-07 RX ADMIN — ONDANSETRON 4 MG: 2 INJECTION INTRAMUSCULAR; INTRAVENOUS at 09:29

## 2018-09-07 RX ADMIN — IOHEXOL 50 ML: 240 INJECTION, SOLUTION INTRATHECAL; INTRAVASCULAR; INTRAVENOUS; ORAL at 10:39

## 2018-09-07 RX ADMIN — METHYLPREDNISOLONE SODIUM SUCCINATE 125 MG: 125 INJECTION, POWDER, FOR SOLUTION INTRAMUSCULAR; INTRAVENOUS at 10:29

## 2018-09-07 RX ADMIN — HYDROMORPHONE HYDROCHLORIDE 1 MG: 1 INJECTION, SOLUTION INTRAMUSCULAR; INTRAVENOUS; SUBCUTANEOUS at 09:29

## 2018-09-07 ASSESSMENT — PAIN SCALES - GENERAL: PAINLEVEL_OUTOF10: 5

## 2018-09-07 NOTE — DISCHARGE INSTRUCTIONS
Abdominal Pain, Extended Version   Belly Pain, Stomach Pain    Take a clear fluid diet 12 hours and use Tylenol for pain.   .  Return to the ED sooner for worsening pain  pain that is worse with movement, uncontrolled vomiting, new fever, increase bleeding, persistent dizziness or ill appearance.  Take Zofran for nausea and vomiting    Follow-up with [facility] is planned for endoscopy    You had an elevated or high normal blood pressure reading today.  This does not necessarily mean you have hypertension.  Please followup with your/a primary physician for comprehensive blood pressure evaluation.  BP Readings from Last 3 Encounters:   09/07/18 (!) 184/129   01/02/17 143/84         Your exam may not have shown the exact reason you have abdominal pain.  Since there are many different causes of abdominal pain, another checkup and more tests may be needed. One of the many possible causes of abdominal pain in any person who has not had their appendix removed is Acute Appendicitis.  Appendicitis is often a very difficult to diagnosis. Normal blood tests, urine tests, and even ultrasound and CT can not ensure there is not an early appendicitis. Sometimes only the changes which occur over time will allow appendicitis and other causes of abdominal pain to be determined.  Because of this, it is important you follow all of the instructions below.                                                                                                Home care instructions  Ø Rest.  Ø Do not eat solid food until your pain is gone.  Ø While You Have Pain:  Stay on a clear liquid diet.  A clear liquid is one you can see through (water, weak tea, broth or bouillon, ginger ale, Jell-o, Anderson-Aid, Gatorade, apple juice, popsicles or ice chips).   Ø When Your Pain is Gone:  Start a light diet (dry toast, crackers, applesauce, white rice, bananas, broth or bouillon) and increase the diet slowly, as long as it does not bother  you.  No dairy products (including cheese and eggs) and no spicy, fatty, fried or high fiber foods.  Ø No alcohol, caffeine or cigarettes.  Ø Take your regular medicines unless the caregiver told you not to.  Ø Take any prescribed medicine as directed.  Ø Do not take medicine containing aspirin, ibuprofen (Advil® / Motrin® ), naprosyn/naproxen (Aleve®) or ketoprofen (Orudis® ) unless told to by your own caregiver.    seek immediate medical attention if :  Ø Your pain is not gone in 8-12 hours.  Ø Your pain becomes worse, changes location or feels different.  Ø You have a fever or shaking chills.  Ø You keep throwing up or cannot drink liquids.   Ø You see blood when you throw up or see blood in your bowel movements.    Ø Your bowel movements become dark or black.  Ø You move your bowels frequently.  Ø Your bowel movements stop (become blocked) or you cannot pass gas.  Ø You have bloody, frequent or painful urination (“passing water”).  Ø Your skin or the whites of your eyes look yellow.  Ø Your stomach becomes bloated or bigger.  Ø You notice bleeding or discharge from your vagina.  Ø You have dizziness or fainting.  Ø You have chest or back pain.   Ø Anything else worries you.  Ø You become short of breath.    If you have questions or concerns, please call your caregiver.     Adapted from ©2001  [employer] Aftercare Instruction Sheets  Last reviewed July 12, 2005, [employer], creator of [employer] Patient Information System.    [employer]® Patient Information ©2007 [employer].

## 2018-09-07 NOTE — ED PROVIDER NOTES
ED Provider Note    CHIEF COMPLAINT  Chief Complaint   Patient presents with   • Abdominal Pain   • Blood in Vomit       HPI  Donna Isaac is a 53 y.o. female who presents for abdominal pain to the right of the umbilicus constantly present for the last 2 weeks and is severe at 6 of 10.  Pain is much worse with doing a sit up and it is moderately increased after eating.  Patient's never had a pain like this before.  She normally has nausea and vomiting but that is now increased and she is vomited about 1/4 cup of blood which is new.  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  She has also had cholecystectomy, appendectomy, hysterectomy and volvulus surgery.  She denies fever.  Denies nonsteroidal anti-inflammatory use.  Rarely drinks alcohol.  No bloody stool.  Prior kidney stone which is different.  No diabetes or immune compromise.  She has had diverticulitis once in the past.    REVIEW OF SYSTEMS  Pertinent positives include: Abdominal pain, vomiting.  Pertinent negatives include: Diarrhea, constipation, dysuria, urgency, frequency, hematuria.  10+ systems reviewed and negative.      PAST MEDICAL HISTORY  Gastric bypass, diverticulitis, volvulus, hiatal hernia, gastric or esophageal stricture    SOCIAL HISTORY  Social History   Substance Use Topics   • Smoking status: Never Smoker   • Smokeless tobacco: Never Used   • Alcohol use No     History   Drug Use No     Just moved to [place] this week.  Has new primary appointments with Dr. Wynn on the 21st and with [facility].  She is seeing Dr. Jeff Endocrinology.    SURGICAL HISTORY  As above    CURRENT MEDICATIONS    Current Outpatient Prescriptions   Medication Sig Dispense Refill   • DULoxetine (CYMBALTA) 60 MG Cap DR Particles delayed-release capsule Take 60 mg by mouth every day.     • gabapentin (NEURONTIN) 400 MG Cap Take 400 mg by mouth 3 times a day.     • meloxicam (MOBIC) 15 MG tablet Take  "15 mg by mouth every day.     • montelukast (SINGULAIR) 10 MG Tab Take 10 mg by mouth every day.     • cetirizine (KLS ALLER-HAWA) 10 MG Tab Take 20 mg by mouth 2 times a day.     • magnesium oxide (MAG-OX) 400 MG Tab Take 400 mg by mouth every day.     • Cyanocobalamin (VITAMIN B-12) 1000 MCG Tab Take 1,000 mcg by mouth every 48 hours.     • Biotin 5000 MCG Cap Take 1 Cap by mouth every day.     • Cholecalciferol (VITAMIN D) 2000 units Cap Take 1 Cap by mouth every day.     • Probiotic Product (PROBIOTIC PO) Take 1 Tab by mouth every day.     • Menaquinone-7 (VITAMIN K2) 100 MCG Cap Take 1 Tab by mouth every day.     • CALCIUM PO Take 1 Tab by mouth every day.     • tizanidine (ZANAFLEX) 4 MG Tab Take 4 mg by mouth every 6 hours as needed.     • cevimeline (EVOXAC) 30 MG capsule Take 30 mg by mouth 3 times a day.     • ranitidine (ZANTAC) 150 MG Tab Take 300 mg by mouth 2 times a day.     • Frovatriptan Succinate (FROVA) 2.5 MG Tab Take 1 Tab by mouth as needed. Indications: Migraine Headache     • pantoprazole (PROTONIX) 40 MG Tablet Delayed Response Take 40 mg by mouth every bedtime.     • multivitamin (THERAGRAN) Tab Take 1 Tab by mouth every day.     • EPINEPHrine (EPIPEN 2-JOSSE) 0.3 MG/0.3ML Solution Auto-injector solution for injection 0.3 mg by Intramuscular route Once.         ALLERGIES  Allergies   Allergen Reactions   • Bee Venom Anaphylaxis   • Contrast Media With Iodine [Iodine] Shortness of Breath   • Econazole Anaphylaxis   • Floxin [Ofloxacin] Anaphylaxis   • Keflex Shortness of Breath   • Levaquin Shortness of Breath   • Morphine Anaphylaxis   • Naloxone Hives     \"hives, SOB\"   • Nitrofurantoin Shortness of Breath     ...and hives     • Oxycodone Shortness of Breath     ...and hives     • Penicillins Shortness of Breath     ...and hives     • Ancef [Cefazolin] Hives   • Bactrim [Sulfamethoxazole W-Trimethoprim] Shortness of Breath   • Bextra [Valdecoxib] Rash     \"Skin burn and peeling.\"   • Other " "Drug Hives     \"Enconazole nitrate.\" shortness of breath and hives   • Other Misc Unspecified     Adhesive tape: blisters, skin peels off   • Norco [Apap-Fd&C Yellow #10 Al Tai-Hydrocodone] Shortness of Breath     ...and hives         PHYSICAL EXAM  VITAL SIGNS: BP (!) 184/129   Pulse (!) 104   Temp 36.6 °C (97.8 °F)   Resp 18   Ht 1.626 m (5' 4\")   Wt 80 kg (176 lb 5.9 oz)   SpO2 96%   BMI 30.27 kg/m²   Reviewed and elevated blood pressure, tachycardic  Constitutional: Well developed, Well nourished, well appearing, moderately overweight.  HENT: Normocephalic, atraumatic, bilateral external ears normal, oropharynx moist, No exudates or erythema.   Eyes: PERRLA, conjunctiva pink, no scleral icterus.   Cardiovascular: Regular borderline tachycardic without murmur, rub, gallop.  Respiratory: No rales, rhonchi, wheeze.  Gastrointestinal: Soft, moderate right midabdominal tenderness with guarding to deep palpation but no rebound or distention.  No masses.  Pain is considerable with sitting up.  Skin: No erythema, no rash.   Genitourinary:  No costovertebral angle tenderness.   Neurologic: Alert & oriented x 3, cranial nerves 2-12 intact by passive exam.  No focal deficit noted.  Psychiatric: Affect normal, Judgment normal, Mood normal.     DIFFERENTIAL DIAGNOSIS:  Abdominal wall pain, diverticulitis, bowel obstruction, Kaye-Rivas tear, other upper GI bleed, pyelonephritis, doubt renal colic.    EKG  EKG Interpretation 9:24 AM    Interpreted by me.  Indication: Abdominal pain    Rhythm: normal sinus   Rate: Normal at 95  Axis: normal  Ectopy: none  Conduction: normal  ST/T Waves: no acute change  Q Waves: none  R Wave progression: normal  Comparison: Not currently available    Clinical Impression: Normal sinus rhythm    RADIOLOGY/PROCEDURES  CT-ABDOMEN-PELVIS WITH   Final Result      1.  No acute abnormality within the abdomen or pelvis      2.  Prior hysterectomy and probably appendectomy      3.  Gastric " postoperative changes      4.  Prior cholecystectomy. Mild biliary dilation with common bile duct measuring 8 mm.          LABORATORY:  Results for orders placed or performed during the hospital encounter of 09/07/18   CBC WITH DIFFERENTIAL   Result Value Ref Range    WBC 5.0 4.8 - 10.8 K/uL    RBC 4.92 4.20 - 5.40 M/uL    Hemoglobin 14.4 12.0 - 16.0 g/dL    Hematocrit 43.5 37.0 - 47.0 %    MCV 88.4 81.4 - 97.8 fL    MCH 29.3 27.0 - 33.0 pg    MCHC 33.1 (L) 33.6 - 35.0 g/dL    RDW 45.6 35.9 - 50.0 fL    Platelet Count 242 164 - 446 K/uL    MPV 10.8 9.0 - 12.9 fL    Neutrophils-Polys 62.60 44.00 - 72.00 %    Lymphocytes 24.10 22.00 - 41.00 %    Monocytes 9.30 0.00 - 13.40 %    Eosinophils 3.00 0.00 - 6.90 %    Basophils 0.80 0.00 - 1.80 %    Immature Granulocytes 0.20 0.00 - 0.90 %    Nucleated RBC 0.00 /100 WBC    Neutrophils (Absolute) 3.15 2.00 - 7.15 K/uL    Lymphs (Absolute) 1.21 1.00 - 4.80 K/uL    Monos (Absolute) 0.47 0.00 - 0.85 K/uL    Eos (Absolute) 0.15 0.00 - 0.51 K/uL    Baso (Absolute) 0.04 0.00 - 0.12 K/uL    Immature Granulocytes (abs) 0.01 0.00 - 0.11 K/uL    NRBC (Absolute) 0.00 K/uL   COMP METABOLIC PANEL   Result Value Ref Range    Sodium 140 135 - 145 mmol/L    Potassium 3.6 3.6 - 5.5 mmol/L    Chloride 103 96 - 112 mmol/L    Co2 27 20 - 33 mmol/L    Anion Gap 10.0 0.0 - 11.9    Glucose 105 (H) 65 - 99 mg/dL    Bun 17 8 - 22 mg/dL    Creatinine 0.94 0.50 - 1.40 mg/dL    Calcium 9.1 8.4 - 10.2 mg/dL    AST(SGOT) 29 12 - 45 U/L    ALT(SGPT) 21 2 - 50 U/L    Alkaline Phosphatase 68 30 - 99 U/L    Total Bilirubin 0.6 0.1 - 1.5 mg/dL    Albumin 3.9 3.2 - 4.9 g/dL    Total Protein 6.5 6.0 - 8.2 g/dL    Globulin 2.6 1.9 - 3.5 g/dL    A-G Ratio 1.5 g/dL   LIPASE   Result Value Ref Range    Lipase 31 7 - 58 U/L   URINALYSIS,CULTURE IF INDICATED   Result Value Ref Range    Color Yellow     Character Clear     Specific Gravity 1.015 <1.035    Ph 7.0 5.0 - 8.0    Glucose Negative Negative mg/dL    Ketones  Negative Negative mg/dL    Protein Negative Negative mg/dL    Bilirubin Negative Negative    Nitrite Negative Negative    Leukocyte Esterase Negative Negative    Occult Blood Negative Negative    Micro Urine Req see below    PROTHROMBIN TIME   Result Value Ref Range    PT 12.6 12.0 - 14.6 sec    INR 0.95 0.87 - 1.13   COD (ADULT)   Result Value Ref Range    ABO Grouping Only O     Rh Grouping Only POS        INTERVENTIONS:  Medications   ondansetron (ZOFRAN) syringe/vial injection 4 mg (4 mg Intravenous Given 9/7/18 0929)   ondansetron (ZOFRAN) syringe/vial injection 4 mg (not administered)   HYDROmorphone (DILAUDID) injection 1 mg (1 mg Intravenous Given 9/7/18 0929)   methylPREDNISolone sod succ (SOLU-MEDROL) 125 MG injection 125 mg (125 mg Intravenous Given 9/7/18 1029)     And   diphenhydrAMINE (BENADRYL) injection 50 mg (50 mg Intravenous Given 9/7/18 1029)   iohexol (OMNIPAQUE) 240 mg/mL (50 mL Oral Given 9/7/18 1039)   iohexol (OMNIPAQUE) 350 mg/mL (100 mL Intravenous Given 9/7/18 1221)     Response: Improved pain and minimal right periumbilical tenderness on repeat exam.    COURSE & MEDICAL DECISION MAKING  Case discussed with Dr. Oden GI who recommended full prep CT scan which was completed in normal    This patient presents with periumbilical abdominal pain of unclear etiology.  The exacerbation with movement suggest this may be an abdominal wall muscle strain.  There is no evidence of bowel obstruction or acute intra-abdominal process such as diverticulitis.  There is no UTI.    This patient has borderline or elevated blood pressure as recorded above and was instructed to followup with primary physician for comprehensive blood pressure evaluation and yearly fasting cholesterol assessment according to to CMS protocol.    This patient had very elevated blood pressure while here in the department.  She states her blood pressure is very variable and is being followed up by her primary physician and she did  not want any medications to be administered here to treat her blood pressure.    PLAN:  New Prescriptions    ONDANSETRON (ZOFRAN ODT) 4 MG TABLET DISPERSIBLE    Take 1 Tab by mouth every 8 hours as needed.     Tylenol for pain  Abdominal pain handout given  Return for new fever, severe pain, increased bleeding, dizziness, uncontrolled vomiting, ill appearance    David Nova D.O.  [address] [place] [address]  [phone]    Schedule an appointment as soon as possible for a visit        CONDITION: Stable.    FINAL IMPRESSION  1. Periumbilical abdominal pain    2. Non-intractable vomiting with nausea, unspecified vomiting type    3.      Elevated blood pressure      Electronically signed by: Benson Larson, 9/7/2018 9:19 AM

## 2018-09-07 NOTE — ED NOTES
ERP at bedside. Pt agrees with plan of care discussed by ERP. AIDET acknowledged with patient. Marie in low position, side rail up for pt safety. Call light within reach. Will continue to monitor.

## 2018-09-07 NOTE — ED NOTES
"Patient continues to have elevated hypertension. Patient reports that she \"rather not take anything for it\" as she states that blood pressure medications in the past have made her \"extremely hypotensive. Patient reports that she is being followed by her doctor for her blood pressure. Discharge instructions provided.  Pt verbalized the understanding of discharge instructions to follow up with GI, PCP and to return to ER if condition worsens. Reinforced the importance of following up with MD regarding blood pressure management, pt verbalized understanding. ERP informed. Pt ambulated out of ER without difficulty.   "

## 2018-09-07 NOTE — ED NOTES
Presents with a 2-week Hx of RLQ abdominal pain with episodic N/V (appendectomy completed in 1984).  She reports observing blood in her vomiting last night.  She is scheduled to follow up with Gastroenterology this coming October 5th.

## 2018-09-07 NOTE — ED NOTES
Med Rec completed per patient with medication list on her phone  Allergies reviewed  No ORAL antibiotics in last 30 days

## 2018-09-18 ENCOUNTER — HOSPITAL ENCOUNTER (OUTPATIENT)
Dept: LAB | Facility: MEDICAL CENTER | Age: 53
End: 2018-09-18
Attending: INTERNAL MEDICINE
Payer: MEDICARE

## 2018-09-18 LAB
25(OH)D3 SERPL-MCNC: 110 NG/ML (ref 30–100)
ALBUMIN SERPL BCP-MCNC: 4.3 G/DL (ref 3.2–4.9)
ALBUMIN SERPL BCP-MCNC: 4.4 G/DL (ref 3.2–4.9)
ALBUMIN/GLOB SERPL: 1.7 G/DL
ALP SERPL-CCNC: 84 U/L (ref 30–99)
ALT SERPL-CCNC: 116 U/L (ref 2–50)
ANION GAP SERPL CALC-SCNC: 7 MMOL/L (ref 0–11.9)
ANION GAP SERPL CALC-SCNC: 9 MMOL/L (ref 0–11.9)
AST SERPL-CCNC: 212 U/L (ref 12–45)
BILIRUB SERPL-MCNC: 0.7 MG/DL (ref 0.1–1.5)
BUN SERPL-MCNC: 16 MG/DL (ref 8–22)
BUN SERPL-MCNC: 19 MG/DL (ref 8–22)
CALCIUM SERPL-MCNC: 10.9 MG/DL (ref 8.5–10.5)
CALCIUM SERPL-MCNC: 9.7 MG/DL (ref 8.5–10.5)
CHLORIDE SERPL-SCNC: 102 MMOL/L (ref 96–112)
CHLORIDE SERPL-SCNC: 103 MMOL/L (ref 96–112)
CHOLEST SERPL-MCNC: 186 MG/DL (ref 100–199)
CO2 SERPL-SCNC: 24 MMOL/L (ref 20–33)
CO2 SERPL-SCNC: 27 MMOL/L (ref 20–33)
CORTIS SERPL-MCNC: 18.4 UG/DL (ref 0–23)
CREAT SERPL-MCNC: 0.9 MG/DL (ref 0.5–1.4)
CREAT SERPL-MCNC: 1.02 MG/DL (ref 0.5–1.4)
CREAT UR-MCNC: 23 MG/DL
CREAT UR-MCNC: 53.4 MG/DL
CREAT UR-MCNC: 53.9 MG/DL
EST. AVERAGE GLUCOSE BLD GHB EST-MCNC: 137 MG/DL
ESTRADIOL SERPL-MCNC: <20 PG/ML
FERRITIN SERPL-MCNC: 42.9 NG/ML (ref 10–291)
FSH SERPL-ACNC: 113.5 MIU/ML
GLOBULIN SER CALC-MCNC: 2.5 G/DL (ref 1.9–3.5)
GLUCOSE SERPL-MCNC: 119 MG/DL (ref 65–99)
GLUCOSE SERPL-MCNC: 93 MG/DL (ref 65–99)
HBA1C MFR BLD: 6.4 % (ref 0–5.6)
HDLC SERPL-MCNC: 126 MG/DL
LDLC SERPL CALC-MCNC: 34 MG/DL
LH SERPL-ACNC: 45 IU/L
MICROALBUMIN UR-MCNC: 0.7 MG/DL
MICROALBUMIN/CREAT UR: 13 MG/G (ref 0–30)
PHOSPHATE SERPL-MCNC: 3.7 MG/DL (ref 2.5–4.5)
PHOSPHATE SERPL-MCNC: 4.3 MG/DL (ref 2.5–4.5)
POTASSIUM SERPL-SCNC: 3.7 MMOL/L (ref 3.6–5.5)
POTASSIUM SERPL-SCNC: 4.3 MMOL/L (ref 3.6–5.5)
PROLACTIN SERPL-MCNC: 37.97 NG/ML (ref 2.8–26)
PROT SERPL-MCNC: 6.8 G/DL (ref 6–8.2)
PTH-INTACT SERPL-MCNC: 74.8 PG/ML (ref 14–72)
SODIUM SERPL-SCNC: 136 MMOL/L (ref 135–145)
SODIUM SERPL-SCNC: 136 MMOL/L (ref 135–145)
T3FREE SERPL-MCNC: 3.85 PG/ML (ref 2.4–4.2)
T4 FREE SERPL-MCNC: 0.92 NG/DL (ref 0.53–1.43)
TESTOST SERPL-MCNC: 30 NG/DL (ref 9–75)
TRIGL SERPL-MCNC: 130 MG/DL (ref 0–149)
TRIGL SERPL-MCNC: 98 MG/DL (ref 0–149)
TSH SERPL DL<=0.005 MIU/L-ACNC: 7.47 UIU/ML (ref 0.38–5.33)
URATE SERPL-MCNC: 5.2 MG/DL (ref 1.9–8.2)
VIT B12 SERPL-MCNC: 528 PG/ML (ref 211–911)

## 2018-09-18 PROCEDURE — 84146 ASSAY OF PROLACTIN: CPT

## 2018-09-18 PROCEDURE — 84443 ASSAY THYROID STIM HORMONE: CPT

## 2018-09-18 PROCEDURE — 84100 ASSAY OF PHOSPHORUS: CPT

## 2018-09-18 PROCEDURE — 82030 ASSAY OF ADP & AMP: CPT

## 2018-09-18 PROCEDURE — 82570 ASSAY OF URINE CREATININE: CPT | Mod: 91

## 2018-09-18 PROCEDURE — 84478 ASSAY OF TRIGLYCERIDES: CPT

## 2018-09-18 PROCEDURE — 82088 ASSAY OF ALDOSTERONE: CPT

## 2018-09-18 PROCEDURE — 82533 TOTAL CORTISOL: CPT

## 2018-09-18 PROCEDURE — 82040 ASSAY OF SERUM ALBUMIN: CPT

## 2018-09-18 PROCEDURE — 83520 IMMUNOASSAY QUANT NOS NONAB: CPT

## 2018-09-18 PROCEDURE — 84481 FREE ASSAY (FT-3): CPT

## 2018-09-18 PROCEDURE — 82340 ASSAY OF CALCIUM IN URINE: CPT

## 2018-09-18 PROCEDURE — 82043 UR ALBUMIN QUANTITATIVE: CPT

## 2018-09-18 PROCEDURE — 83002 ASSAY OF GONADOTROPIN (LH): CPT

## 2018-09-18 PROCEDURE — 80061 LIPID PANEL: CPT

## 2018-09-18 PROCEDURE — 80048 BASIC METABOLIC PNL TOTAL CA: CPT

## 2018-09-18 PROCEDURE — 82340 ASSAY OF CALCIUM IN URINE: CPT | Mod: 91

## 2018-09-18 PROCEDURE — 80053 COMPREHEN METABOLIC PANEL: CPT

## 2018-09-18 PROCEDURE — 83525 ASSAY OF INSULIN: CPT

## 2018-09-18 PROCEDURE — 82728 ASSAY OF FERRITIN: CPT

## 2018-09-18 PROCEDURE — 82670 ASSAY OF TOTAL ESTRADIOL: CPT

## 2018-09-18 PROCEDURE — 84100 ASSAY OF PHOSPHORUS: CPT | Mod: 91

## 2018-09-18 PROCEDURE — 82030 ASSAY OF ADP & AMP: CPT | Mod: 91

## 2018-09-18 PROCEDURE — 82725 ASSAY OF BLOOD FATTY ACIDS: CPT

## 2018-09-18 PROCEDURE — 82607 VITAMIN B-12: CPT

## 2018-09-18 PROCEDURE — 84105 ASSAY OF URINE PHOSPHORUS: CPT | Mod: 91

## 2018-09-18 PROCEDURE — 82652 VIT D 1 25-DIHYDROXY: CPT

## 2018-09-18 PROCEDURE — 83970 ASSAY OF PARATHORMONE: CPT

## 2018-09-18 PROCEDURE — 82570 ASSAY OF URINE CREATININE: CPT

## 2018-09-18 PROCEDURE — 84305 ASSAY OF SOMATOMEDIN: CPT

## 2018-09-18 PROCEDURE — 84550 ASSAY OF BLOOD/URIC ACID: CPT

## 2018-09-18 PROCEDURE — 83945 ASSAY OF OXALATE: CPT

## 2018-09-18 PROCEDURE — 83001 ASSAY OF GONADOTROPIN (FSH): CPT

## 2018-09-18 PROCEDURE — 82163 ASSAY OF ANGIOTENSIN II: CPT

## 2018-09-18 PROCEDURE — 83036 HEMOGLOBIN GLYCOSYLATED A1C: CPT

## 2018-09-18 PROCEDURE — 84105 ASSAY OF URINE PHOSPHORUS: CPT

## 2018-09-18 PROCEDURE — 82306 VITAMIN D 25 HYDROXY: CPT

## 2018-09-18 PROCEDURE — 82626 DEHYDROEPIANDROSTERONE: CPT

## 2018-09-18 PROCEDURE — 84439 ASSAY OF FREE THYROXINE: CPT

## 2018-09-18 PROCEDURE — 84403 ASSAY OF TOTAL TESTOSTERONE: CPT

## 2018-09-20 LAB
1,25(OH)2D3 SERPL-MCNC: 58.8 PG/ML (ref 19.9–79.3)
ALDOST SERPL-MCNC: 20.8 NG/DL
CALCIUM 24H UR-MCNC: 2.9 MG/DL
CALCIUM 24H UR-MCNC: 5.5 MG/DL
CALCIUM 24H UR-MRATE: NORMAL MG/D
CALCIUM 24H UR-MRATE: NORMAL MG/D
CALCIUM/CREAT 24H UR: 289 MG/G (ref 20–300)
CALCIUM/CREAT 24H UR: 64 MG/G (ref 20–300)
COLLECT DURATION TIME SPEC: NORMAL HRS
CREAT 24H UR-MCNC: 19 MG/DL
CREAT 24H UR-MCNC: 45 MG/DL
CREAT 24H UR-MRATE: NORMAL MG/D (ref 500–1400)
CREAT 24H UR-MRATE: NORMAL MG/D (ref 500–1400)
IGF-I SERPL-MCNC: 128 NG/ML (ref 51–233)
IGF-I Z-SCORE SERPL: 0.2
INSULIN P FAST SERPL-ACNC: 4 UIU/ML (ref 3–19)
PHOSPHATE 24H UR-MCNC: 11 MG/DL
PHOSPHATE 24H UR-MCNC: 33 MG/DL
PHOSPHATE 24H UR-MRATE: NORMAL MG/D (ref 400–1300)
PHOSPHATE 24H UR-MRATE: NORMAL MG/D (ref 400–1300)
PHOSPHATE/CREAT 24H UR: 579 MG/G
PHOSPHATE/CREAT 24H UR: 733 MG/G
SPECIMEN VOL ?TM UR: NORMAL ML
SPECIMEN VOL ?TM UR: NORMAL ML
TOTAL VOLUME 1105: NORMAL ML
TOTAL VOLUME 1105: NORMAL ML

## 2018-09-21 ENCOUNTER — HOSPITAL ENCOUNTER (OUTPATIENT)
Dept: LAB | Facility: MEDICAL CENTER | Age: 53
End: 2018-09-21
Attending: INTERNAL MEDICINE
Payer: MEDICARE

## 2018-09-21 LAB
COLLECT DURATION TIME SPEC: NORMAL H
CREAT 24H UR-MCNC: 45 MG/DL
CREAT UR-MCNC: 173.3 MG/DL
NEFA SERPL-SCNC: 0.48 MMOL/L
OXALATE 24H UR-MCNC: 9 MG/L
OXALATE 24H UR-MRATE: NORMAL MG/D (ref 13–40)
POTASSIUM UR-SCNC: 37.2 MMOL/L
SODIUM UR-SCNC: 46 MMOL/L
TOTAL VOLUME 1105: NORMAL ML

## 2018-09-21 PROCEDURE — 84133 ASSAY OF URINE POTASSIUM: CPT

## 2018-09-21 PROCEDURE — 82340 ASSAY OF CALCIUM IN URINE: CPT | Mod: 91

## 2018-09-21 PROCEDURE — 82570 ASSAY OF URINE CREATININE: CPT

## 2018-09-21 PROCEDURE — 84300 ASSAY OF URINE SODIUM: CPT

## 2018-09-21 PROCEDURE — 84300 ASSAY OF URINE SODIUM: CPT | Mod: 91

## 2018-09-21 PROCEDURE — 82340 ASSAY OF CALCIUM IN URINE: CPT

## 2018-09-21 PROCEDURE — 84133 ASSAY OF URINE POTASSIUM: CPT | Mod: 91

## 2018-09-21 PROCEDURE — 82570 ASSAY OF URINE CREATININE: CPT | Mod: 91

## 2018-09-22 LAB
CREAT UR-MCNC: 394.4 MG/DL
DHEA SERPL-MCNC: 3.78 NG/ML (ref 0.63–4.7)
POTASSIUM UR-SCNC: 65.1 MMOL/L
SODIUM UR-SCNC: 39 MMOL/L

## 2018-09-25 ENCOUNTER — OFFICE VISIT (OUTPATIENT)
Dept: URGENT CARE | Facility: CLINIC | Age: 53
End: 2018-09-25
Payer: MEDICARE

## 2018-09-25 VITALS
OXYGEN SATURATION: 95 % | RESPIRATION RATE: 16 BRPM | BODY MASS INDEX: 30.73 KG/M2 | WEIGHT: 180 LBS | DIASTOLIC BLOOD PRESSURE: 76 MMHG | TEMPERATURE: 98.3 F | SYSTOLIC BLOOD PRESSURE: 92 MMHG | HEIGHT: 64 IN | HEART RATE: 92 BPM

## 2018-09-25 DIAGNOSIS — J01.00 ACUTE MAXILLARY SINUSITIS, RECURRENCE NOT SPECIFIED: ICD-10-CM

## 2018-09-25 DIAGNOSIS — J22 LRTI (LOWER RESPIRATORY TRACT INFECTION): ICD-10-CM

## 2018-09-25 LAB
CALCIUM 24H UR-MCNC: 11.7 MG/DL
CALCIUM 24H UR-MCNC: 14.8 MG/DL
CALCIUM 24H UR-MRATE: NORMAL MG/D
CALCIUM 24H UR-MRATE: NORMAL MG/D
CALCIUM/CREAT 24H UR: 46 MG/G (ref 20–300)
CALCIUM/CREAT 24H UR: 80 MG/G (ref 20–300)
COLLECT DURATION TIME SPEC: NORMAL HRS
COLLECT DURATION TIME SPEC: NORMAL HRS
CREAT 24H UR-MCNC: 147 MG/DL
CREAT 24H UR-MCNC: 319 MG/DL
CREAT 24H UR-MRATE: NORMAL MG/D (ref 500–1400)
CREAT 24H UR-MRATE: NORMAL MG/D (ref 500–1400)
SPECIMEN VOL ?TM UR: NORMAL ML
SPECIMEN VOL ?TM UR: NORMAL ML

## 2018-09-25 PROCEDURE — 99204 OFFICE O/P NEW MOD 45 MIN: CPT | Performed by: PHYSICIAN ASSISTANT

## 2018-09-25 RX ORDER — BENZONATATE 200 MG/1
200 CAPSULE ORAL 3 TIMES DAILY PRN
Qty: 30 CAP | Refills: 0 | Status: SHIPPED | OUTPATIENT
Start: 2018-09-25 | End: 2018-11-04

## 2018-09-25 RX ORDER — DOXYCYCLINE HYCLATE 100 MG
100 TABLET ORAL 2 TIMES DAILY
Qty: 14 TAB | Refills: 0 | Status: ON HOLD | OUTPATIENT
Start: 2018-09-25 | End: 2018-09-28

## 2018-09-25 RX ORDER — METHYLPREDNISOLONE 4 MG/1
TABLET ORAL
Qty: 21 TAB | Refills: 0 | Status: ON HOLD | OUTPATIENT
Start: 2018-09-25 | End: 2018-09-28

## 2018-09-25 ASSESSMENT — ENCOUNTER SYMPTOMS
SPUTUM PRODUCTION: 1
HEMOPTYSIS: 1
SHORTNESS OF BREATH: 1
FEVER: 0
WHEEZING: 1
HEADACHES: 1
SINUS PAIN: 1
PALPITATIONS: 0
COUGH: 1
TINGLING: 0
CHILLS: 0
NAUSEA: 0
FOCAL WEAKNESS: 0
RHINORRHEA: 1
SENSORY CHANGE: 0
ABDOMINAL PAIN: 0
SORE THROAT: 0
WEIGHT LOSS: 0
MYALGIAS: 0
VOMITING: 0

## 2018-09-25 ASSESSMENT — PATIENT HEALTH QUESTIONNAIRE - PHQ9: CLINICAL INTERPRETATION OF PHQ2 SCORE: 0

## 2018-09-25 NOTE — PROGRESS NOTES
Subjective:      Donna Isaac is a 53 y.o. female who presents with Sinus Problem (x3 wks. cough with bloody phlegm,  Pt. states it has gotten worse and she is very wheezy. )            Cough   This is a new problem. Episode onset: 3 weeks  The problem has been gradually worsening. The problem occurs constantly. The cough is productive of blood-tinged sputum. Associated symptoms include ear congestion, ear pain, headaches, hemoptysis, nasal congestion, rhinorrhea, shortness of breath and wheezing. Pertinent negatives include no chest pain, chills, fever, myalgias, postnasal drip, rash, sore throat or weight loss. Associated symptoms comments: Sinus pressure. . Nothing aggravates the symptoms. She has tried a beta-agonist inhaler (saline rinses, RX migraine medication) for the symptoms. Her past medical history is significant for asthma, bronchitis and pneumonia.     Patient reports history of esophageal narrowing. Has consult in 2 weeks with GI.   Patient reports coughing up blood-tinged mucous. She denies hx of DVT or PE.  Denies calf pain or leg swelling.    WELL's Criteria for Pulmonary Embolism    1. Clinical signs and symptoms of DVT? 0 (3pts)  2. PE is #1 diagnosis, or equally likely? 0  (3pts)  3. Heart rate > 100? 0 (1.5pts)  4. Immobilization at least 3 days, or Surgery in the Previous 4 weeks? 0 (1.5pts)  5. Previous, objectively diagnosed PE or DVT? 0 (1.5pts)  6. Hemoptysis? 1 (1 pts)  7. Malignancy w/ Treatment within 6 mo, or palliative? 0 (1pt)    Score total: 1 ( <4 PE unlikely)       History reviewed. No pertinent past medical history.    History reviewed. No pertinent surgical history.    History reviewed. No pertinent family history.    Allergies   Allergen Reactions   • Bee Venom Anaphylaxis   • Contrast Media With Iodine [Iodine] Shortness of Breath   • Econazole Anaphylaxis   • Floxin [Ofloxacin] Anaphylaxis   • Keflex Shortness of Breath   • Levaquin Shortness of Breath   • Morphine  "Anaphylaxis   • Naloxone Hives     \"hives, SOB\"   • Nitrofurantoin Shortness of Breath     ...and hives     • Oxycodone Shortness of Breath     ...and hives     • Penicillins Shortness of Breath     ...and hives     • Ancef [Cefazolin] Hives   • Bactrim [Sulfamethoxazole W-Trimethoprim] Shortness of Breath   • Bextra [Valdecoxib] Rash     \"Skin burn and peeling.\"   • Other Drug Hives     \"Enconazole nitrate.\" shortness of breath and hives   • Other Misc Unspecified     Adhesive tape: blisters, skin peels off   • Norco [Apap-Fd&C Yellow #10 Al Tai-Hydrocodone] Shortness of Breath     ...and hives         Medications, Allergies, and current problem list reviewed today in Epic      Review of Systems   Constitutional: Negative for chills, fever, malaise/fatigue and weight loss.   HENT: Positive for congestion, ear pain, rhinorrhea and sinus pain. Negative for ear discharge, postnasal drip and sore throat.    Respiratory: Positive for cough, hemoptysis, sputum production, shortness of breath and wheezing.    Cardiovascular: Negative for chest pain, palpitations and leg swelling.   Gastrointestinal: Negative for abdominal pain, nausea and vomiting.   Musculoskeletal: Negative for myalgias.   Skin: Negative for rash.   Neurological: Positive for headaches. Negative for tingling, sensory change and focal weakness.     All other systems reviewed and are negative.        Objective:     BP (!) 92/76 (BP Location: Right arm, Patient Position: Sitting, BP Cuff Size: Adult)   Pulse 92   Temp 36.8 °C (98.3 °F) (Temporal)   Resp 16   Ht 1.626 m (5' 4\")   Wt 81.6 kg (180 lb)   SpO2 95%   BMI 30.90 kg/m²      Physical Exam   Constitutional: She is oriented to person, place, and time. She appears well-developed and well-nourished. No distress.   HENT:   Head: Normocephalic and atraumatic.   Right Ear: Tympanic membrane, external ear and ear canal normal.   Left Ear: Tympanic membrane, external ear and ear canal normal.   Nose: " Mucosal edema and rhinorrhea present. Right sinus exhibits maxillary sinus tenderness. Left sinus exhibits maxillary sinus tenderness.   Mouth/Throat: Uvula is midline and oropharynx is clear and moist.   Eyes: Conjunctivae are normal.   Neck: Neck supple.   Cardiovascular: Normal rate, regular rhythm and normal heart sounds.  Exam reveals no gallop and no friction rub.    No murmur heard.  Pulmonary/Chest: Effort normal. No respiratory distress. She has wheezes (slight wheezes upper lung fields ). She has no rales.   Musculoskeletal: She exhibits no edema.   Lower extremities without calf TTP or calf swelling bilaterally   Lymphadenopathy:     She has no cervical adenopathy.   Neurological: She is alert and oriented to person, place, and time.   Skin: Skin is warm and dry. No rash noted.   Psychiatric: She has a normal mood and affect. Her behavior is normal. Judgment and thought content normal.               Assessment/Plan:     1. LRTI (lower respiratory tract infection)  doxycycline (VIBRAMYCIN) 100 MG Tab    MethylPREDNISolone (MEDROL DOSEPAK) 4 MG Tablet Therapy Pack    benzonatate (TESSALON) 200 MG capsule   2. Acute maxillary sinusitis, recurrence not specified  doxycycline (VIBRAMYCIN) 100 MG Tab    MethylPREDNISolone (MEDROL DOSEPAK) 4 MG Tablet Therapy Pack       Suspect bleeding is bronchial, sinus-related, due to chronic esophageal irritation, or combination of all.   Low Wells score.  Discussed that getting a Chest X-ray would be appropriate and thorough to rule out underlying pneumonia. No X-ray available at this site. Patient wishes to hold off on X-ray at this time.     Current Outpatient Prescriptions:   •  doxycycline (VIBRAMYCIN) 100 MG Tab, Take 1 Tab by mouth 2 times a day for 7 days., Disp: 14 Tab, Rfl: 0    •  MethylPREDNISolone (MEDROL DOSEPAK) 4 MG Tablet Therapy Pack, Take as directed on package. 1 pack., Disp: 21 Tab, Rfl: 0    •  benzonatate (TESSALON) 200 MG capsule, Take 1 Cap by mouth  3 times a day as needed., Disp: 30 Cap, Rfl: 0     Differential diagnoses, Supportive care, and indications for immediate follow-up discussed with patient.   Instructed to return to clinic or nearest emergency department for any change in condition, further concerns, or worsening of symptoms.    .Patient  given precautionary s/sx that mandate immediate follow up and evaluation in the ED. Advised of risks of not doing so.      The patient demonstrated a good understanding and agreed with the treatment plan.    Corazon Bonilla P.A.-C.

## 2018-09-26 ENCOUNTER — APPOINTMENT (OUTPATIENT)
Dept: RADIOLOGY | Facility: MEDICAL CENTER | Age: 53
DRG: 871 | End: 2018-09-26
Payer: MEDICARE

## 2018-09-26 ENCOUNTER — HOSPITAL ENCOUNTER (INPATIENT)
Facility: MEDICAL CENTER | Age: 53
LOS: 3 days | DRG: 871 | End: 2018-09-29
Attending: EMERGENCY MEDICINE | Admitting: HOSPITALIST
Payer: MEDICARE

## 2018-09-26 DIAGNOSIS — R09.02 HYPOXIA: ICD-10-CM

## 2018-09-26 DIAGNOSIS — I50.43 ACUTE ON CHRONIC COMBINED SYSTOLIC AND DIASTOLIC CONGESTIVE HEART FAILURE (HCC): ICD-10-CM

## 2018-09-26 DIAGNOSIS — J18.9 PNEUMONIA OF BOTH LOWER LOBES DUE TO INFECTIOUS ORGANISM: ICD-10-CM

## 2018-09-26 DIAGNOSIS — A41.9 SEPSIS, DUE TO UNSPECIFIED ORGANISM: ICD-10-CM

## 2018-09-26 PROBLEM — E87.20 LACTIC ACID ACIDOSIS: Status: ACTIVE | Noted: 2018-09-26

## 2018-09-26 PROBLEM — R04.2 HEMOPTYSIS: Status: ACTIVE | Noted: 2018-09-26

## 2018-09-26 PROBLEM — E87.6 HYPOKALEMIA: Status: ACTIVE | Noted: 2018-09-26

## 2018-09-26 PROBLEM — J96.01 ACUTE RESPIRATORY FAILURE WITH HYPOXIA (HCC): Status: ACTIVE | Noted: 2018-09-26

## 2018-09-26 PROBLEM — I10 ESSENTIAL HYPERTENSION: Status: ACTIVE | Noted: 2018-09-26

## 2018-09-26 LAB
ALBUMIN SERPL BCP-MCNC: 3.8 G/DL (ref 3.2–4.9)
ALBUMIN/GLOB SERPL: 1.2 G/DL
ALP SERPL-CCNC: 90 U/L (ref 30–99)
ALT SERPL-CCNC: 42 U/L (ref 2–50)
ANION GAP SERPL CALC-SCNC: 9 MMOL/L (ref 0–11.9)
AST SERPL-CCNC: 39 U/L (ref 12–45)
BASOPHILS # BLD AUTO: 0.3 % (ref 0–1.8)
BASOPHILS # BLD: 0.04 K/UL (ref 0–0.12)
BILIRUB SERPL-MCNC: 0.6 MG/DL (ref 0.1–1.5)
BUN SERPL-MCNC: 16 MG/DL (ref 8–22)
CALCIUM SERPL-MCNC: 9.4 MG/DL (ref 8.4–10.2)
CHLORIDE SERPL-SCNC: 107 MMOL/L (ref 96–112)
CO2 SERPL-SCNC: 22 MMOL/L (ref 20–33)
CREAT SERPL-MCNC: 1.05 MG/DL (ref 0.5–1.4)
D DIMER PPP IA.FEU-MCNC: 0.49 UG/ML (FEU) (ref 0–0.5)
EOSINOPHIL # BLD AUTO: 0.01 K/UL (ref 0–0.51)
EOSINOPHIL NFR BLD: 0.1 % (ref 0–6.9)
ERYTHROCYTE [DISTWIDTH] IN BLOOD BY AUTOMATED COUNT: 49.1 FL (ref 35.9–50)
GLOBULIN SER CALC-MCNC: 3.1 G/DL (ref 1.9–3.5)
GLUCOSE SERPL-MCNC: 141 MG/DL (ref 65–99)
HCT VFR BLD AUTO: 45.3 % (ref 37–47)
HGB BLD-MCNC: 14.8 G/DL (ref 12–16)
IMM GRANULOCYTES # BLD AUTO: 0.06 K/UL (ref 0–0.11)
IMM GRANULOCYTES NFR BLD AUTO: 0.4 % (ref 0–0.9)
LACTATE BLD-SCNC: 1.6 MMOL/L (ref 0.5–2)
LACTATE BLD-SCNC: 3.7 MMOL/L (ref 0.5–2)
LYMPHOCYTES # BLD AUTO: 0.92 K/UL (ref 1–4.8)
LYMPHOCYTES NFR BLD: 5.9 % (ref 22–41)
MCH RBC QN AUTO: 29.5 PG (ref 27–33)
MCHC RBC AUTO-ENTMCNC: 32.7 G/DL (ref 33.6–35)
MCV RBC AUTO: 90.4 FL (ref 81.4–97.8)
MONOCYTES # BLD AUTO: 1.09 K/UL (ref 0–0.85)
MONOCYTES NFR BLD AUTO: 6.9 % (ref 0–13.4)
NEUTROPHILS # BLD AUTO: 13.57 K/UL (ref 2–7.15)
NEUTROPHILS NFR BLD: 86.4 % (ref 44–72)
NRBC # BLD AUTO: 0 K/UL
NRBC BLD-RTO: 0 /100 WBC
PLATELET # BLD AUTO: 258 K/UL (ref 164–446)
PMV BLD AUTO: 11.2 FL (ref 9–12.9)
POTASSIUM SERPL-SCNC: 3.3 MMOL/L (ref 3.6–5.5)
PROCALCITONIN SERPL-MCNC: <0.05 NG/ML
PROT SERPL-MCNC: 6.9 G/DL (ref 6–8.2)
RBC # BLD AUTO: 5.01 M/UL (ref 4.2–5.4)
SODIUM SERPL-SCNC: 138 MMOL/L (ref 135–145)
TROPONIN I SERPL-MCNC: 0.11 NG/ML (ref 0–0.04)
TROPONIN I SERPL-MCNC: 0.15 NG/ML (ref 0–0.04)
TSH SERPL DL<=0.005 MIU/L-ACNC: 1.76 UIU/ML (ref 0.38–5.33)
WBC # BLD AUTO: 15.7 K/UL (ref 4.8–10.8)

## 2018-09-26 PROCEDURE — 700101 HCHG RX REV CODE 250

## 2018-09-26 PROCEDURE — 93005 ELECTROCARDIOGRAM TRACING: CPT | Performed by: HOSPITALIST

## 2018-09-26 PROCEDURE — 99285 EMERGENCY DEPT VISIT HI MDM: CPT

## 2018-09-26 PROCEDURE — 770006 HCHG ROOM/CARE - MED/SURG/GYN SEMI*

## 2018-09-26 PROCEDURE — 96365 THER/PROPH/DIAG IV INF INIT: CPT

## 2018-09-26 PROCEDURE — 71045 X-RAY EXAM CHEST 1 VIEW: CPT

## 2018-09-26 PROCEDURE — 84145 PROCALCITONIN (PCT): CPT

## 2018-09-26 PROCEDURE — 36415 COLL VENOUS BLD VENIPUNCTURE: CPT

## 2018-09-26 PROCEDURE — 700111 HCHG RX REV CODE 636 W/ 250 OVERRIDE (IP)

## 2018-09-26 PROCEDURE — 93010 ELECTROCARDIOGRAM REPORT: CPT | Performed by: INTERNAL MEDICINE

## 2018-09-26 PROCEDURE — 99222 1ST HOSP IP/OBS MODERATE 55: CPT | Mod: AI | Performed by: HOSPITALIST

## 2018-09-26 PROCEDURE — 83605 ASSAY OF LACTIC ACID: CPT

## 2018-09-26 PROCEDURE — 87040 BLOOD CULTURE FOR BACTERIA: CPT

## 2018-09-26 PROCEDURE — 85025 COMPLETE CBC W/AUTO DIFF WBC: CPT

## 2018-09-26 PROCEDURE — 700111 HCHG RX REV CODE 636 W/ 250 OVERRIDE (IP): Performed by: HOSPITALIST

## 2018-09-26 PROCEDURE — 94640 AIRWAY INHALATION TREATMENT: CPT

## 2018-09-26 PROCEDURE — 83036 HEMOGLOBIN GLYCOSYLATED A1C: CPT

## 2018-09-26 PROCEDURE — 84443 ASSAY THYROID STIM HORMONE: CPT

## 2018-09-26 PROCEDURE — 700105 HCHG RX REV CODE 258: Performed by: EMERGENCY MEDICINE

## 2018-09-26 PROCEDURE — 700111 HCHG RX REV CODE 636 W/ 250 OVERRIDE (IP): Performed by: EMERGENCY MEDICINE

## 2018-09-26 PROCEDURE — 85379 FIBRIN DEGRADATION QUANT: CPT

## 2018-09-26 PROCEDURE — 84484 ASSAY OF TROPONIN QUANT: CPT

## 2018-09-26 PROCEDURE — 94760 N-INVAS EAR/PLS OXIMETRY 1: CPT

## 2018-09-26 PROCEDURE — 96375 TX/PRO/DX INJ NEW DRUG ADDON: CPT

## 2018-09-26 PROCEDURE — A9270 NON-COVERED ITEM OR SERVICE: HCPCS | Performed by: HOSPITALIST

## 2018-09-26 PROCEDURE — 93005 ELECTROCARDIOGRAM TRACING: CPT | Performed by: EMERGENCY MEDICINE

## 2018-09-26 PROCEDURE — 80053 COMPREHEN METABOLIC PANEL: CPT

## 2018-09-26 PROCEDURE — 304561 HCHG STAT O2

## 2018-09-26 PROCEDURE — 700102 HCHG RX REV CODE 250 W/ 637 OVERRIDE(OP): Performed by: HOSPITALIST

## 2018-09-26 RX ORDER — IPRATROPIUM BROMIDE AND ALBUTEROL SULFATE 2.5; .5 MG/3ML; MG/3ML
SOLUTION RESPIRATORY (INHALATION)
Status: COMPLETED
Start: 2018-09-26 | End: 2018-09-26

## 2018-09-26 RX ORDER — LINEZOLID 600 MG/1
600 TABLET, FILM COATED ORAL EVERY 12 HOURS
Status: DISCONTINUED | OUTPATIENT
Start: 2018-09-26 | End: 2018-09-29 | Stop reason: HOSPADM

## 2018-09-26 RX ORDER — CETIRIZINE HYDROCHLORIDE 10 MG/1
10 TABLET ORAL 2 TIMES DAILY
Status: ON HOLD | COMMUNITY
End: 2019-02-19

## 2018-09-26 RX ORDER — DIPHENHYDRAMINE HYDROCHLORIDE 50 MG/ML
INJECTION INTRAMUSCULAR; INTRAVENOUS
Status: COMPLETED
Start: 2018-09-26 | End: 2018-09-26

## 2018-09-26 RX ORDER — IPRATROPIUM BROMIDE AND ALBUTEROL SULFATE 2.5; .5 MG/3ML; MG/3ML
3 SOLUTION RESPIRATORY (INHALATION)
Status: DISCONTINUED | OUTPATIENT
Start: 2018-09-26 | End: 2018-09-29 | Stop reason: HOSPADM

## 2018-09-26 RX ORDER — KETOROLAC TROMETHAMINE 30 MG/ML
30 INJECTION, SOLUTION INTRAMUSCULAR; INTRAVENOUS EVERY 6 HOURS PRN
Status: DISCONTINUED | OUTPATIENT
Start: 2018-09-26 | End: 2018-09-29 | Stop reason: HOSPADM

## 2018-09-26 RX ORDER — IPRATROPIUM BROMIDE AND ALBUTEROL SULFATE 2.5; .5 MG/3ML; MG/3ML
3 SOLUTION RESPIRATORY (INHALATION) 4 TIMES DAILY
Status: DISCONTINUED | OUTPATIENT
Start: 2018-09-27 | End: 2018-09-27

## 2018-09-26 RX ORDER — RANITIDINE 150 MG/1
300 TABLET ORAL 2 TIMES DAILY
Status: DISCONTINUED | OUTPATIENT
Start: 2018-09-26 | End: 2018-09-26

## 2018-09-26 RX ORDER — AZITHROMYCIN 250 MG/1
250 TABLET, FILM COATED ORAL DAILY
Status: DISCONTINUED | OUTPATIENT
Start: 2018-09-27 | End: 2018-09-29 | Stop reason: HOSPADM

## 2018-09-26 RX ORDER — SODIUM CHLORIDE 9 MG/ML
1000 INJECTION, SOLUTION INTRAVENOUS CONTINUOUS
Status: ACTIVE | OUTPATIENT
Start: 2018-09-26 | End: 2018-09-26

## 2018-09-26 RX ORDER — IPRATROPIUM BROMIDE AND ALBUTEROL SULFATE 2.5; .5 MG/3ML; MG/3ML
3 SOLUTION RESPIRATORY (INHALATION)
Status: DISCONTINUED | OUTPATIENT
Start: 2018-09-26 | End: 2018-09-26

## 2018-09-26 RX ORDER — AZITHROMYCIN 500 MG/1
500 INJECTION, POWDER, LYOPHILIZED, FOR SOLUTION INTRAVENOUS ONCE
Status: DISCONTINUED | OUTPATIENT
Start: 2018-09-26 | End: 2018-09-26

## 2018-09-26 RX ORDER — BISACODYL 10 MG
10 SUPPOSITORY, RECTAL RECTAL
Status: DISCONTINUED | OUTPATIENT
Start: 2018-09-26 | End: 2018-09-29 | Stop reason: HOSPADM

## 2018-09-26 RX ORDER — DIPHENHYDRAMINE HYDROCHLORIDE 50 MG/ML
25 INJECTION INTRAMUSCULAR; INTRAVENOUS ONCE
Status: COMPLETED | OUTPATIENT
Start: 2018-09-26 | End: 2018-09-26

## 2018-09-26 RX ORDER — ACETAMINOPHEN 325 MG/1
650 TABLET ORAL EVERY 6 HOURS PRN
Status: DISCONTINUED | OUTPATIENT
Start: 2018-09-26 | End: 2018-09-29 | Stop reason: HOSPADM

## 2018-09-26 RX ORDER — MONTELUKAST SODIUM 10 MG/1
10 TABLET ORAL DAILY
Status: DISCONTINUED | OUTPATIENT
Start: 2018-09-26 | End: 2018-09-29 | Stop reason: HOSPADM

## 2018-09-26 RX ORDER — ENALAPRILAT 1.25 MG/ML
0.62 INJECTION INTRAVENOUS ONCE
Status: COMPLETED | OUTPATIENT
Start: 2018-09-26 | End: 2018-09-26

## 2018-09-26 RX ORDER — POTASSIUM CHLORIDE 20 MEQ/1
40 TABLET, EXTENDED RELEASE ORAL ONCE
Status: COMPLETED | OUTPATIENT
Start: 2018-09-26 | End: 2018-09-26

## 2018-09-26 RX ORDER — PANTOPRAZOLE SODIUM 40 MG/1
40 TABLET, DELAYED RELEASE ORAL
Status: DISCONTINUED | OUTPATIENT
Start: 2018-09-26 | End: 2018-09-26

## 2018-09-26 RX ORDER — ONDANSETRON 2 MG/ML
4 INJECTION INTRAMUSCULAR; INTRAVENOUS ONCE
Status: COMPLETED | OUTPATIENT
Start: 2018-09-26 | End: 2018-09-26

## 2018-09-26 RX ORDER — SODIUM CHLORIDE 9 MG/ML
1000 INJECTION, SOLUTION INTRAVENOUS CONTINUOUS
Status: DISPENSED | OUTPATIENT
Start: 2018-09-26 | End: 2018-09-26

## 2018-09-26 RX ORDER — OMEPRAZOLE 20 MG/1
20 CAPSULE, DELAYED RELEASE ORAL
Status: DISCONTINUED | OUTPATIENT
Start: 2018-09-26 | End: 2018-09-29 | Stop reason: HOSPADM

## 2018-09-26 RX ORDER — AMOXICILLIN 250 MG
2 CAPSULE ORAL 2 TIMES DAILY
Status: DISCONTINUED | OUTPATIENT
Start: 2018-09-26 | End: 2018-09-29 | Stop reason: HOSPADM

## 2018-09-26 RX ORDER — HEPARIN SODIUM 5000 [USP'U]/ML
5000 INJECTION, SOLUTION INTRAVENOUS; SUBCUTANEOUS EVERY 8 HOURS
Status: DISCONTINUED | OUTPATIENT
Start: 2018-09-26 | End: 2018-09-27

## 2018-09-26 RX ORDER — TIZANIDINE 4 MG/1
4 TABLET ORAL EVERY 6 HOURS PRN
Status: DISCONTINUED | OUTPATIENT
Start: 2018-09-26 | End: 2018-09-29 | Stop reason: HOSPADM

## 2018-09-26 RX ORDER — METOPROLOL SUCCINATE 25 MG/1
25 TABLET, EXTENDED RELEASE ORAL
Status: DISCONTINUED | OUTPATIENT
Start: 2018-09-26 | End: 2018-09-29 | Stop reason: HOSPADM

## 2018-09-26 RX ORDER — FAMOTIDINE 20 MG/1
20 TABLET, FILM COATED ORAL EVERY 12 HOURS
Status: DISCONTINUED | OUTPATIENT
Start: 2018-09-26 | End: 2018-09-29 | Stop reason: HOSPADM

## 2018-09-26 RX ORDER — HYDRALAZINE HYDROCHLORIDE 10 MG/1
10 TABLET, FILM COATED ORAL EVERY 8 HOURS PRN
Status: DISCONTINUED | OUTPATIENT
Start: 2018-09-26 | End: 2018-09-29 | Stop reason: HOSPADM

## 2018-09-26 RX ORDER — GABAPENTIN 400 MG/1
400 CAPSULE ORAL 3 TIMES DAILY
Status: DISCONTINUED | OUTPATIENT
Start: 2018-09-26 | End: 2018-09-29 | Stop reason: HOSPADM

## 2018-09-26 RX ORDER — DULOXETIN HYDROCHLORIDE 30 MG/1
60 CAPSULE, DELAYED RELEASE ORAL DAILY
Status: DISCONTINUED | OUTPATIENT
Start: 2018-09-26 | End: 2018-09-29 | Stop reason: HOSPADM

## 2018-09-26 RX ORDER — POLYETHYLENE GLYCOL 3350 17 G/17G
1 POWDER, FOR SOLUTION ORAL
Status: DISCONTINUED | OUTPATIENT
Start: 2018-09-26 | End: 2018-09-29 | Stop reason: HOSPADM

## 2018-09-26 RX ORDER — AZITHROMYCIN 250 MG/1
500 TABLET, FILM COATED ORAL ONCE
Status: COMPLETED | OUTPATIENT
Start: 2018-09-26 | End: 2018-09-26

## 2018-09-26 RX ADMIN — DIPHENHYDRAMINE HYDROCHLORIDE 25 MG: 50 INJECTION INTRAMUSCULAR; INTRAVENOUS at 11:23

## 2018-09-26 RX ADMIN — LINEZOLID 600 MG: 600 TABLET, FILM COATED ORAL at 17:34

## 2018-09-26 RX ADMIN — HYDROMORPHONE HYDROCHLORIDE 1 MG: 1 INJECTION, SOLUTION INTRAMUSCULAR; INTRAVENOUS; SUBCUTANEOUS at 10:28

## 2018-09-26 RX ADMIN — POTASSIUM CHLORIDE 40 MEQ: 1500 TABLET, EXTENDED RELEASE ORAL at 14:26

## 2018-09-26 RX ADMIN — IPRATROPIUM BROMIDE AND ALBUTEROL SULFATE 3 ML: .5; 3 SOLUTION RESPIRATORY (INHALATION) at 21:40

## 2018-09-26 RX ADMIN — DULOXETINE HYDROCHLORIDE 60 MG: 30 CAPSULE, DELAYED RELEASE ORAL at 22:41

## 2018-09-26 RX ADMIN — Medication 400 MG: at 21:05

## 2018-09-26 RX ADMIN — SODIUM CHLORIDE 1000 ML: 9 INJECTION, SOLUTION INTRAVENOUS at 10:29

## 2018-09-26 RX ADMIN — GABAPENTIN 400 MG: 400 CAPSULE ORAL at 12:28

## 2018-09-26 RX ADMIN — ACETAMINOPHEN 650 MG: 325 TABLET, FILM COATED ORAL at 22:43

## 2018-09-26 RX ADMIN — HEPARIN SODIUM 5000 UNITS: 5000 INJECTION, SOLUTION INTRAVENOUS; SUBCUTANEOUS at 21:04

## 2018-09-26 RX ADMIN — KETOROLAC TROMETHAMINE 30 MG: 30 INJECTION, SOLUTION INTRAMUSCULAR at 14:26

## 2018-09-26 RX ADMIN — ONDANSETRON HYDROCHLORIDE 4 MG: 2 INJECTION INTRAMUSCULAR; INTRAVENOUS at 10:29

## 2018-09-26 RX ADMIN — TIGECYCLINE 100 MG: 50 INJECTION, POWDER, LYOPHILIZED, FOR SOLUTION INTRAVENOUS at 10:37

## 2018-09-26 RX ADMIN — FAMOTIDINE 20 MG: 20 TABLET ORAL at 17:35

## 2018-09-26 RX ADMIN — MONTELUKAST SODIUM 10 MG: 10 TABLET, FILM COATED ORAL at 21:05

## 2018-09-26 RX ADMIN — AZITHROMYCIN MONOHYDRATE 500 MG: 250 TABLET ORAL at 17:36

## 2018-09-26 RX ADMIN — ENALAPRILAT 0.62 MG: 2.5 INJECTION INTRAVENOUS at 11:25

## 2018-09-26 RX ADMIN — OMEPRAZOLE 20 MG: 20 CAPSULE, DELAYED RELEASE ORAL at 21:05

## 2018-09-26 RX ADMIN — HEPARIN SODIUM 5000 UNITS: 5000 INJECTION, SOLUTION INTRAVENOUS; SUBCUTANEOUS at 14:26

## 2018-09-26 RX ADMIN — GABAPENTIN 400 MG: 400 CAPSULE ORAL at 17:34

## 2018-09-26 RX ADMIN — KETOROLAC TROMETHAMINE 30 MG: 30 INJECTION, SOLUTION INTRAMUSCULAR at 21:02

## 2018-09-26 ASSESSMENT — COPD QUESTIONNAIRES
DURING THE PAST 4 WEEKS HOW MUCH DID YOU FEEL SHORT OF BREATH: NONE/LITTLE OF THE TIME
DO YOU EVER COUGH UP ANY MUCUS OR PHLEGM?: NO/ONLY WITH OCCASIONAL COLDS OR INFECTIONS
COPD SCREENING SCORE: 1
HAVE YOU SMOKED AT LEAST 100 CIGARETTES IN YOUR ENTIRE LIFE: NO/DON'T KNOW

## 2018-09-26 ASSESSMENT — PAIN SCALES - GENERAL
PAINLEVEL_OUTOF10: 7
PAINLEVEL_OUTOF10: 8
PAINLEVEL_OUTOF10: 7
PAINLEVEL_OUTOF10: 8
PAINLEVEL_OUTOF10: 4

## 2018-09-26 ASSESSMENT — ENCOUNTER SYMPTOMS
TINGLING: 0
HEARTBURN: 0
HEADACHES: 0
BLURRED VISION: 0
NERVOUS/ANXIOUS: 0
WEAKNESS: 0
DIZZINESS: 0
ABDOMINAL PAIN: 0
HALLUCINATIONS: 0
MYALGIAS: 0
SHORTNESS OF BREATH: 1
DEPRESSION: 0
DIARRHEA: 0
FEVER: 0
COUGH: 1
EYE PAIN: 0
ORTHOPNEA: 0
FOCAL WEAKNESS: 0

## 2018-09-26 ASSESSMENT — LIFESTYLE VARIABLES
EVER_SMOKED: NEVER
EVER_SMOKED: NEVER

## 2018-09-26 NOTE — CARE PLAN
Problem: Safety  Goal: Will remain free from injury  Fall precautions remain in place: treaded socks on pt, appropriate signs on doorway, IV pole on side of bathroom, lowest bed rails down, bed in lowest position and locked, call light and phone within reach.      Problem: Venous Thromboembolism (VTW)/Deep Vein Thrombosis (DVT) Prevention:  Goal: Patient will participate in Venous Thrombosis (VTE)/Deep Vein Thrombosis (DVT)Prevention Measures   09/26/18 1300   Mechanical/VTE Prophylaxis   Mechanical Prophylaxis  SCDs, Sequential Compression Device   SCDs, Sequential Compression Device Refused   OTHER   Pharmacologic Prophylaxis Used Unfractionated Heparin

## 2018-09-26 NOTE — ED NOTES
Pt reports itching at end of tygacil infusion, Pt medicated as directed by ER md, poc update given to pt. Further orders and dispo pending at this time. No further questions from pt at this time

## 2018-09-26 NOTE — ASSESSMENT & PLAN NOTE
Continues to have small amounts while coughing, cough is most dry, sputum production at times  No recent travels, no exposure to TB, nonsmoking, no weight loss, never prior episodes  At this time suspect it is 2/2 to acute PNA, however will order labs including renal function, coag studies, Autoimmune serology  Will send for sputum culture  Monitor hgb- stable currently 11.6 however dropped from >14, not on fluids either  Consulted Dr. Newman pulmonology  Will obtain a chest CT at this time as well

## 2018-09-26 NOTE — H&P
Hospital Medicine History and Physical    Date of Service  9/26/2018    Chief Complaint  Chief Complaint   Patient presents with   • Shortness of Breath     x3 days.  Seen at  yesterday.  Given ABX for sinus infection and bronchitis.   • Congestion       History of Presenting Illness  53 y.o. female who presented 9/26/2018 with cough x 2 weeks, went to the  and received doxycycline and steroids, however coming in with worsening Sob, desatting in 80% in the ER and coughing, no sputum. No chest pain, no LE swelling. Has also been associated with coughing up some blood, less than dime size, intermittently, no hx of weightloss, no family hx of cancer, not a smoker  Patient is allergic to a lot of medications.    CXR showing bronchopneumonia  Septic on admission, tachy, HTN, white count 15.7    Primary Care Physician  Katie Wynn P.A.-C.    Consultants  none    Code Status  full    Review of Systems  Review of Systems   Constitutional: Negative for fever and malaise/fatigue.   HENT: Negative for ear discharge, ear pain, hearing loss and nosebleeds.    Eyes: Negative for blurred vision and pain.   Respiratory: Positive for cough and shortness of breath.    Cardiovascular: Negative for chest pain, orthopnea and leg swelling.   Gastrointestinal: Negative for abdominal pain, diarrhea and heartburn.   Genitourinary: Negative for dysuria, frequency and hematuria.   Musculoskeletal: Negative for joint pain and myalgias.   Skin: Negative for itching and rash.   Neurological: Negative for dizziness, tingling, focal weakness, weakness and headaches.   Psychiatric/Behavioral: Negative for depression and hallucinations. The patient is not nervous/anxious.         Past Medical History  Past Medical History:   Diagnosis Date   • Asthma    • Chronic pain    • Fibromyalgia    • GERD (gastroesophageal reflux disease)    • Migraine    • Osteoporosis    • Urticaria        Surgical History  Past Surgical History:   Procedure  Laterality Date   • APPENDECTOMY     • OTHER ABDOMINAL SURGERY         Medications  No current facility-administered medications on file prior to encounter.      Current Outpatient Prescriptions on File Prior to Encounter   Medication Sig Dispense Refill   • doxycycline (VIBRAMYCIN) 100 MG Tab Take 1 Tab by mouth 2 times a day for 7 days. 14 Tab 0   • MethylPREDNISolone (MEDROL DOSEPAK) 4 MG Tablet Therapy Pack Take as directed on package. 1 pack. 21 Tab 0   • benzonatate (TESSALON) 200 MG capsule Take 1 Cap by mouth 3 times a day as needed. 30 Cap 0   • DULoxetine (CYMBALTA) 60 MG Cap DR Particles delayed-release capsule Take 60 mg by mouth every day.     • gabapentin (NEURONTIN) 400 MG Cap Take 400 mg by mouth 3 times a day.     • meloxicam (MOBIC) 15 MG tablet Take 15 mg by mouth every day.     • montelukast (SINGULAIR) 10 MG Tab Take 10 mg by mouth every day.     • magnesium oxide (MAG-OX) 400 MG Tab Take 400 mg by mouth every day.     • Cyanocobalamin (VITAMIN B-12) 1000 MCG Tab Take 1,000 mcg by mouth every 48 hours.     • Biotin 5000 MCG Cap Take 1 Cap by mouth every day.     • Cholecalciferol (VITAMIN D) 2000 units Cap Take 1 Cap by mouth every day.     • Probiotic Product (PROBIOTIC PO) Take 1 Tab by mouth every day.     • Menaquinone-7 (VITAMIN K2) 100 MCG Cap Take 1 Tab by mouth every day.     • tizanidine (ZANAFLEX) 4 MG Tab Take 4 mg by mouth every 6 hours as needed.     • cevimeline (EVOXAC) 30 MG capsule Take 30 mg by mouth 3 times a day.     • ranitidine (ZANTAC) 150 MG Tab Take 300 mg by mouth 2 times a day.     • Frovatriptan Succinate (FROVA) 2.5 MG Tab Take 1 Tab by mouth as needed. Indications: Migraine Headache     • pantoprazole (PROTONIX) 40 MG Tablet Delayed Response Take 40 mg by mouth every bedtime.     • multivitamin (THERAGRAN) Tab Take 1 Tab by mouth every day.         Family History  Discussed with patient  No FH of lung cancer or lung disease  Otherwise noncontributory  Social  "History  Social History   Substance Use Topics   • Smoking status: Never Smoker   • Smokeless tobacco: Never Used   • Alcohol use Yes      Comment: occasional       Allergies  Allergies   Allergen Reactions   • Bee Venom Anaphylaxis   • Contrast Media With Iodine [Iodine] Shortness of Breath   • Econazole Anaphylaxis   • Floxin [Ofloxacin] Anaphylaxis   • Keflex Shortness of Breath   • Levaquin Shortness of Breath   • Morphine Anaphylaxis   • Naloxone Hives     \"hives, SOB\"   • Nitrofurantoin Shortness of Breath     ...and hives     • Oxycodone Shortness of Breath     ...and hives     • Penicillins Shortness of Breath     ...and hives     • Ancef [Cefazolin] Hives   • Bactrim [Sulfamethoxazole W-Trimethoprim] Shortness of Breath   • Bextra [Valdecoxib] Rash     \"Skin burn and peeling.\"   • Other Drug Hives     \"Enconazole nitrate.\" shortness of breath and hives   • Other Misc Unspecified     Adhesive tape: blisters, skin peels off   • Norco [Apap-Fd&C Yellow #10 Al [place]-Hydrocodone] Shortness of Breath     ...and hives     • Tygacil [Tigecycline]      itching        Physical Exam  Laboratory   Hemodynamics  Temp (24hrs), Av.8 °C (98.3 °F), Min:36.6 °C (97.8 °F), Max:37.1 °C (98.7 °F)   Temperature: 37.1 °C (98.7 °F)  Pulse  Av  Min: 112  Max: 120 Heart Rate (Monitored): (!) 112  Blood Pressure: (!) 182/103 (notified RN), NIBP: (!) 199/96      Respiratory      Respiration: 18, Pulse Oximetry: 93 %             Physical Exam   Constitutional: She is oriented to person, place, and time. She appears well-developed and well-nourished. No distress.   HENT:   Head: Normocephalic and atraumatic.   Mouth/Throat: Oropharynx is clear and moist. No oropharyngeal exudate.   Eyes: Pupils are equal, round, and reactive to light. Conjunctivae and EOM are normal. No scleral icterus.   Neck: Normal range of motion. Neck supple. No JVD present.   Cardiovascular: Normal rate, regular rhythm and intact distal pulses.    No " murmur heard.  Pulmonary/Chest: Effort normal and breath sounds normal. No respiratory distress. She has no wheezes. She has no rales.   Abdominal: Soft. Bowel sounds are normal. She exhibits no distension. There is no rebound.   Musculoskeletal: She exhibits no edema or tenderness.   Lymphadenopathy:     She has no cervical adenopathy.   Neurological: She is alert and oriented to person, place, and time. No cranial nerve deficit. She exhibits normal muscle tone.   Skin: Skin is warm and dry. No rash noted. No erythema. No pallor.   Psychiatric: She has a normal mood and affect. Her behavior is normal.       Recent Labs      09/26/18 0922   WBC  15.7*   RBC  5.01   HEMOGLOBIN  14.8   HEMATOCRIT  45.3   MCV  90.4   MCH  29.5   MCHC  32.7*   RDW  49.1   PLATELETCT  258   MPV  11.2     Recent Labs      09/26/18 0922   SODIUM  138   POTASSIUM  3.3*   CHLORIDE  107   CO2  22   GLUCOSE  141*   BUN  16   CREATININE  1.05   CALCIUM  9.4     Recent Labs      09/26/18 0922   ALTSGPT  42   ASTSGOT  39   ALKPHOSPHAT  90   TBILIRUBIN  0.6   GLUCOSE  141*                 No results found for: TROPONINI  Urinalysis:    Lab Results  Component Value Date/Time   SPECGRAVITY 1.015 09/07/2018 0937   GLUCOSEUR Negative 09/07/2018 0937   KETONES Negative 09/07/2018 0937   NITRITE Negative 09/07/2018 0937        Imaging  Cxr:   Bronchopneumonia favored over edema   Assessment/Plan     I anticipate this patient will require at least two midnights for appropriate medical management, necessitating inpatient admission.    * Sepsis (HCC)   Assessment & Plan    This is severe sepsis with the following associated acute organ dysfunction(s): acute respiratory failure.   Tachycardia, HTN, white count and new O2 requirement  Likely 2/2 PNA  Treat with abx  RT protocol  Wean O2  Cultures pending        Acute respiratory failure with hypoxia (HCC)   Assessment & Plan    2/2 acute PNA  Treat as above  Wean o2        Hemoptysis   Assessment &  Plan    Prior to admission, small amounts, likely 2/2 acute PNA  Monitor  hgb stable        CAP (community acquired pneumonia)   Assessment & Plan    Failed outpatient t/m  Start IV abx here  Monitor cultures  RT protocol  Wean O2        Hypokalemia   Assessment & Plan    3.3, replace and monitor        Lactic acid acidosis   Assessment & Plan    3.7 in UC, improved to <2 now on admission  Monitor IVF        Essential hypertension   Assessment & Plan    Uncontrolled with high BP on admission  Continue home meds and adding PRNs            VTE prophylaxis: heparin.

## 2018-09-26 NOTE — FLOWSHEET NOTE
09/26/18 1325   Events/Summary/Plan   Events/Summary/Plan RCP completed   Non-Invasive Resp Device Site Inspection Completed Intact   Interdisciplinary Plan of Care-Goals (Indications)   Hyperinflation Protocol Indications Atelectasis Documented by Chest X-Ray   Interdisciplinary Plan of Care-Outcomes    Hyperinflation Protocol Goals/Outcome Improvement in Repeat CXR   Education   Education Yes - Pt. / Family has been Instructed in use of Respiratory Equipment   Incentive Spirometry Group   Incentive Spirometry Instruction Yes   Breathing Exercises Yes   Incentive Spirometer Volume 1000 mL   Incentive Spirometer Date Last Changed 09/26/18   Incentive Spirometer Next Change Date (Q 30 Days) 10/26/18   Chest Exam   Respiration 18   Heart Rate (Monitored) (!) 106   Breath Sounds   RUL Breath Sounds Clear;Diminished   RML Breath Sounds Diminished   RLL Breath Sounds Diminished   TRISTAN Breath Sounds Clear;Diminished   LLL Breath Sounds Diminished   Secretions   Cough Non Productive   Oximetry   #Pulse Oximetry (Single Determination) Yes   Oxygen   Home O2 Use Prior To Admission? No   Pulse Oximetry 95 %   O2 (LPM) 3   O2 Daily Delivery Respiratory  Silicone Nasal Cannula

## 2018-09-26 NOTE — ED NOTES
Pt medicated with pain and ABX  as directed by ER md, poc update given to pt.   Admitting orders received. Waiting for bed assignment.

## 2018-09-26 NOTE — PROGRESS NOTES
Pt arrived on [facility], no signs of distress. Pt requiring 3 liters of oxygen, pt speaking in full sentences and tolerating 3 liters of oxygen.  at bedside.bolus infusing. Pain controlled per pt. POC discussed safety measures in place, no hives or rash noted at this time.

## 2018-09-26 NOTE — PROGRESS NOTES
Dr. Jj made aware pt continues to have hypertension. Pt complaining of pain, generalized, back pain, chronic per pt, Md made aware.

## 2018-09-26 NOTE — ED NOTES
Med Rec complete per Pt at bedside  Allergies Reviewed    Pt started a 7 day course of DOXYCYLINE on 9/25    Pt started a MEDROL JOSSE on 9/25

## 2018-09-26 NOTE — PROGRESS NOTES
Pt complaining of chest pain,  was at bedside to evaluate, D dimer ordered, pt speaking in full sentences, no signs of distress. toradol given. Pt states she needs to have her esophagus dilated and she gets chest pain after eating, occasionally.

## 2018-09-26 NOTE — ED NOTES
Yermo assessment done, pt assessment negative. No addition interventions needed  Call light within reach, bed in lowest position .

## 2018-09-26 NOTE — ASSESSMENT & PLAN NOTE
This is severe sepsis with the following associated acute organ dysfunction(s): acute respiratory failure.   Tachycardia, HTN, white count and new O2 requirement  Likely 2/2 PNA  Treat with abx  RT protocol  Wean O2  Cultures pending  Wean O2

## 2018-09-26 NOTE — ED NOTES
Ambulatory to room.  Changed into gown.  Blood and BC x1 drawn.  Unable to obtain PIV a this time.  Primary RN notified.

## 2018-09-26 NOTE — ED NOTES
Donna Poncho 53 y.o. female   Chief Complaint   Patient presents with   • Shortness of Breath     x3 days.  Seen at  yesterday.  Given ABX for sinus infection and bronchitis.   • Congestion      Sepsis score 3.  Sepsis protocol initiated.  To 06-H.

## 2018-09-26 NOTE — ED NOTES
Iv placed , 2nd bld cx drawn and taken to lab. poc update given to pt, results pending at this time

## 2018-09-27 ENCOUNTER — APPOINTMENT (OUTPATIENT)
Dept: CARDIOLOGY | Facility: MEDICAL CENTER | Age: 53
DRG: 871 | End: 2018-09-27
Attending: HOSPITALIST
Payer: MEDICARE

## 2018-09-27 ENCOUNTER — APPOINTMENT (OUTPATIENT)
Dept: RADIOLOGY | Facility: MEDICAL CENTER | Age: 53
DRG: 871 | End: 2018-09-27
Attending: HOSPITALIST
Payer: MEDICARE

## 2018-09-27 LAB
ANION GAP SERPL CALC-SCNC: 6 MMOL/L (ref 0–11.9)
BASOPHILS # BLD AUTO: 0.4 % (ref 0–1.8)
BASOPHILS # BLD: 0.03 K/UL (ref 0–0.12)
BNP SERPL-MCNC: 1359 PG/ML (ref 0–100)
BUN SERPL-MCNC: 30 MG/DL (ref 8–22)
CALCIUM SERPL-MCNC: 8.6 MG/DL (ref 8.4–10.2)
CHLORIDE SERPL-SCNC: 108 MMOL/L (ref 96–112)
CHOLEST SERPL-MCNC: 158 MG/DL (ref 100–199)
CO2 SERPL-SCNC: 23 MMOL/L (ref 20–33)
CREAT SERPL-MCNC: 1.11 MG/DL (ref 0.5–1.4)
EKG IMPRESSION: NORMAL
EOSINOPHIL # BLD AUTO: 0.08 K/UL (ref 0–0.51)
EOSINOPHIL NFR BLD: 0.9 % (ref 0–6.9)
ERYTHROCYTE [DISTWIDTH] IN BLOOD BY AUTOMATED COUNT: 53.6 FL (ref 35.9–50)
EST. AVERAGE GLUCOSE BLD GHB EST-MCNC: 128 MG/DL
GLUCOSE SERPL-MCNC: 91 MG/DL (ref 65–99)
HBA1C MFR BLD: 6.1 % (ref 0–5.6)
HCT VFR BLD AUTO: 37.1 % (ref 37–47)
HDLC SERPL-MCNC: 98 MG/DL
HGB BLD-MCNC: 11.6 G/DL (ref 12–16)
IMM GRANULOCYTES # BLD AUTO: 0.01 K/UL (ref 0–0.11)
IMM GRANULOCYTES NFR BLD AUTO: 0.1 % (ref 0–0.9)
INR PPP: 1.04 (ref 0.87–1.13)
LDLC SERPL CALC-MCNC: 45 MG/DL
LV EJECT FRACT  99904: 60
LV EJECT FRACT MOD 2C 99903: 57.06
LV EJECT FRACT MOD 4C 99902: 64.44
LV EJECT FRACT MOD BP 99901: 61.94
LYMPHOCYTES # BLD AUTO: 1.87 K/UL (ref 1–4.8)
LYMPHOCYTES NFR BLD: 22.1 % (ref 22–41)
MCH RBC QN AUTO: 29.8 PG (ref 27–33)
MCHC RBC AUTO-ENTMCNC: 31.3 G/DL (ref 33.6–35)
MCV RBC AUTO: 95.4 FL (ref 81.4–97.8)
MONOCYTES # BLD AUTO: 0.73 K/UL (ref 0–0.85)
MONOCYTES NFR BLD AUTO: 8.6 % (ref 0–13.4)
NEUTROPHILS # BLD AUTO: 5.75 K/UL (ref 2–7.15)
NEUTROPHILS NFR BLD: 67.9 % (ref 44–72)
NRBC # BLD AUTO: 0 K/UL
NRBC BLD-RTO: 0 /100 WBC
PLATELET # BLD AUTO: 172 K/UL (ref 164–446)
PMV BLD AUTO: 12 FL (ref 9–12.9)
POTASSIUM SERPL-SCNC: 4.1 MMOL/L (ref 3.6–5.5)
PROTHROMBIN TIME: 13.5 SEC (ref 12–14.6)
RBC # BLD AUTO: 3.89 M/UL (ref 4.2–5.4)
SODIUM SERPL-SCNC: 137 MMOL/L (ref 135–145)
TRIGL SERPL-MCNC: 76 MG/DL (ref 0–149)
WBC # BLD AUTO: 8.5 K/UL (ref 4.8–10.8)

## 2018-09-27 PROCEDURE — 99222 1ST HOSP IP/OBS MODERATE 55: CPT | Performed by: INTERNAL MEDICINE

## 2018-09-27 PROCEDURE — 85025 COMPLETE CBC W/AUTO DIFF WBC: CPT

## 2018-09-27 PROCEDURE — 86255 FLUORESCENT ANTIBODY SCREEN: CPT

## 2018-09-27 PROCEDURE — 93306 TTE W/DOPPLER COMPLETE: CPT

## 2018-09-27 PROCEDURE — 74150 CT ABDOMEN W/O CONTRAST: CPT

## 2018-09-27 PROCEDURE — 80061 LIPID PANEL: CPT

## 2018-09-27 PROCEDURE — 86038 ANTINUCLEAR ANTIBODIES: CPT

## 2018-09-27 PROCEDURE — 36415 COLL VENOUS BLD VENIPUNCTURE: CPT

## 2018-09-27 PROCEDURE — 700102 HCHG RX REV CODE 250 W/ 637 OVERRIDE(OP): Performed by: HOSPITALIST

## 2018-09-27 PROCEDURE — 85610 PROTHROMBIN TIME: CPT

## 2018-09-27 PROCEDURE — A9270 NON-COVERED ITEM OR SERVICE: HCPCS | Performed by: HOSPITALIST

## 2018-09-27 PROCEDURE — 700101 HCHG RX REV CODE 250: Performed by: HOSPITALIST

## 2018-09-27 PROCEDURE — 99232 SBSQ HOSP IP/OBS MODERATE 35: CPT | Performed by: HOSPITALIST

## 2018-09-27 PROCEDURE — 80048 BASIC METABOLIC PNL TOTAL CA: CPT

## 2018-09-27 PROCEDURE — 94640 AIRWAY INHALATION TREATMENT: CPT

## 2018-09-27 PROCEDURE — 770006 HCHG ROOM/CARE - MED/SURG/GYN SEMI*

## 2018-09-27 PROCEDURE — 94760 N-INVAS EAR/PLS OXIMETRY 1: CPT

## 2018-09-27 PROCEDURE — 83880 ASSAY OF NATRIURETIC PEPTIDE: CPT

## 2018-09-27 PROCEDURE — 700111 HCHG RX REV CODE 636 W/ 250 OVERRIDE (IP): Performed by: HOSPITALIST

## 2018-09-27 PROCEDURE — 93306 TTE W/DOPPLER COMPLETE: CPT | Mod: 26 | Performed by: INTERNAL MEDICINE

## 2018-09-27 RX ORDER — IPRATROPIUM BROMIDE AND ALBUTEROL SULFATE 2.5; .5 MG/3ML; MG/3ML
3 SOLUTION RESPIRATORY (INHALATION)
Status: DISCONTINUED | OUTPATIENT
Start: 2018-09-27 | End: 2018-09-29 | Stop reason: HOSPADM

## 2018-09-27 RX ADMIN — KETOROLAC TROMETHAMINE 30 MG: 30 INJECTION, SOLUTION INTRAMUSCULAR at 18:25

## 2018-09-27 RX ADMIN — HEPARIN SODIUM 5000 UNITS: 5000 INJECTION, SOLUTION INTRAVENOUS; SUBCUTANEOUS at 06:37

## 2018-09-27 RX ADMIN — IPRATROPIUM BROMIDE AND ALBUTEROL SULFATE 3 ML: .5; 3 SOLUTION RESPIRATORY (INHALATION) at 06:41

## 2018-09-27 RX ADMIN — MONTELUKAST SODIUM 10 MG: 10 TABLET, FILM COATED ORAL at 06:43

## 2018-09-27 RX ADMIN — FAMOTIDINE 20 MG: 20 TABLET ORAL at 18:05

## 2018-09-27 RX ADMIN — FAMOTIDINE 20 MG: 20 TABLET ORAL at 06:42

## 2018-09-27 RX ADMIN — IPRATROPIUM BROMIDE AND ALBUTEROL SULFATE 3 ML: .5; 3 SOLUTION RESPIRATORY (INHALATION) at 23:30

## 2018-09-27 RX ADMIN — GABAPENTIN 400 MG: 400 CAPSULE ORAL at 18:05

## 2018-09-27 RX ADMIN — LINEZOLID 600 MG: 600 TABLET, FILM COATED ORAL at 06:43

## 2018-09-27 RX ADMIN — GABAPENTIN 400 MG: 400 CAPSULE ORAL at 06:42

## 2018-09-27 RX ADMIN — GABAPENTIN 400 MG: 400 CAPSULE ORAL at 13:02

## 2018-09-27 RX ADMIN — METOPROLOL SUCCINATE 25 MG: 25 TABLET, EXTENDED RELEASE ORAL at 06:43

## 2018-09-27 RX ADMIN — STANDARDIZED SENNA CONCENTRATE AND DOCUSATE SODIUM 2 TABLET: 8.6; 5 TABLET, FILM COATED ORAL at 06:44

## 2018-09-27 RX ADMIN — LINEZOLID 600 MG: 600 TABLET, FILM COATED ORAL at 18:05

## 2018-09-27 RX ADMIN — IPRATROPIUM BROMIDE AND ALBUTEROL SULFATE 3 ML: .5; 3 SOLUTION RESPIRATORY (INHALATION) at 15:10

## 2018-09-27 RX ADMIN — OMEPRAZOLE 20 MG: 20 CAPSULE, DELAYED RELEASE ORAL at 21:24

## 2018-09-27 RX ADMIN — ACETAMINOPHEN 650 MG: 325 TABLET, FILM COATED ORAL at 16:19

## 2018-09-27 RX ADMIN — AZITHROMYCIN MONOHYDRATE 250 MG: 250 TABLET ORAL at 06:43

## 2018-09-27 RX ADMIN — KETOROLAC TROMETHAMINE 30 MG: 30 INJECTION, SOLUTION INTRAMUSCULAR at 06:39

## 2018-09-27 RX ADMIN — DULOXETINE HYDROCHLORIDE 60 MG: 30 CAPSULE, DELAYED RELEASE ORAL at 06:43

## 2018-09-27 RX ADMIN — IPRATROPIUM BROMIDE AND ALBUTEROL SULFATE 3 ML: .5; 3 SOLUTION RESPIRATORY (INHALATION) at 10:30

## 2018-09-27 RX ADMIN — Medication 400 MG: at 06:42

## 2018-09-27 RX ADMIN — IPRATROPIUM BROMIDE AND ALBUTEROL SULFATE 3 ML: .5; 3 SOLUTION RESPIRATORY (INHALATION) at 18:23

## 2018-09-27 ASSESSMENT — LIFESTYLE VARIABLES
HOW MANY TIMES IN THE PAST YEAR HAVE YOU HAD 5 OR MORE DRINKS IN A DAY: 0
HAVE YOU EVER FELT YOU SHOULD CUT DOWN ON YOUR DRINKING: NO
EVER FELT BAD OR GUILTY ABOUT YOUR DRINKING: NO
TOTAL SCORE: 0
TOTAL SCORE: 0
HAVE PEOPLE ANNOYED YOU BY CRITICIZING YOUR DRINKING: NO
EVER HAD A DRINK FIRST THING IN THE MORNING TO STEADY YOUR NERVES TO GET RID OF A HANGOVER: NO
TOTAL SCORE: 0
ALCOHOL_USE: YES
ON A TYPICAL DAY WHEN YOU DRINK ALCOHOL HOW MANY DRINKS DO YOU HAVE: 2
AVERAGE NUMBER OF DAYS PER WEEK YOU HAVE A DRINK CONTAINING ALCOHOL: 1
CONSUMPTION TOTAL: NEGATIVE

## 2018-09-27 ASSESSMENT — COGNITIVE AND FUNCTIONAL STATUS - GENERAL
MOBILITY SCORE: 24
SUGGESTED CMS G CODE MODIFIER DAILY ACTIVITY: CH
SUGGESTED CMS G CODE MODIFIER MOBILITY: CH
DAILY ACTIVITIY SCORE: 24

## 2018-09-27 ASSESSMENT — ENCOUNTER SYMPTOMS
BACK PAIN: 0
HEARTBURN: 0
TINGLING: 0
MYALGIAS: 0
WEAKNESS: 0
GASTROINTESTINAL NEGATIVE: 1
EYES NEGATIVE: 1
PSYCHIATRIC NEGATIVE: 1
HEMOPTYSIS: 1
PALPITATIONS: 0
DOUBLE VISION: 0
ABDOMINAL PAIN: 0
SINUS PAIN: 0
MUSCULOSKELETAL NEGATIVE: 1
SPUTUM PRODUCTION: 1
NECK PAIN: 0
SHORTNESS OF BREATH: 0
SHORTNESS OF BREATH: 1
BLURRED VISION: 0
VOMITING: 0
DIZZINESS: 0
DIARRHEA: 0
CONSTITUTIONAL NEGATIVE: 1
DEPRESSION: 0
FEVER: 0
NEUROLOGICAL NEGATIVE: 1
HEADACHES: 0
ORTHOPNEA: 0
COUGH: 1

## 2018-09-27 ASSESSMENT — PATIENT HEALTH QUESTIONNAIRE - PHQ9
1. LITTLE INTEREST OR PLEASURE IN DOING THINGS: NOT AT ALL
SUM OF ALL RESPONSES TO PHQ9 QUESTIONS 1 AND 2: 0
2. FEELING DOWN, DEPRESSED, IRRITABLE, OR HOPELESS: NOT AT ALL

## 2018-09-27 ASSESSMENT — PAIN SCALES - GENERAL
PAINLEVEL_OUTOF10: 6
PAINLEVEL_OUTOF10: 3
PAINLEVEL_OUTOF10: 0

## 2018-09-27 NOTE — ASSESSMENT & PLAN NOTE
This is from her right sided pneumonia  Her hemoptysis is mixed with phlegm and that is reassuring  Improved so far with treatment with broader spectrum Abx  As there is an obvious pna and clinical hx fits do not think she needs a CT scan  She has improved and with the inflammation improving, her hemoptysis should resolved  She should follow up in 2 weeks with a cxr  Discussed with Primary team and patient  Contact information for pulmonary clinic given  All questions answered

## 2018-09-27 NOTE — PROGRESS NOTES
"Pt continues to complain of CP, pt states \" I have had it ever since I got that pineapple got stuck in my esophagus\", pt denies SOB and states it does not radiate anywhere. Pt states cough makes it worse.   "

## 2018-09-27 NOTE — CARE PLAN
Problem: Safety  Goal: Will remain free from injury  Outcome: PROGRESSING AS EXPECTED  All fall precautions in place. No evidence of fall

## 2018-09-27 NOTE — ED PROVIDER NOTES
CHIEF COMPLAINT  Chief Complaint   Patient presents with   • Shortness of Breath     x3 days.  Seen at  yesterday.  Given ABX for sinus infection and bronchitis.   • Congestion       HPI  Donna Isaac is a 53 y.o. female who presents to emergency today in severe distress with shortness of breath over the last 3 days cough is productive of green phlegm and sputum. Patient presents hypoxic at 80% on room air. She is brought immediately back to the emergency room placed on oxygen she has no chest pain no abdominal pain nausea vomiting she's had fever and chills at home. She was seen in urgent care yesterday and she is placed on antibiotics but has gotten worse, she has history of common variable immune deficiency, symptoms occurred home the contact external activities.    REVIEW OF SYSTEMS  See HPI for further details. All other systems are negative.     PAST MEDICAL HISTORY  Past Medical History:   Diagnosis Date   • Asthma    • Chronic pain    • Fibromyalgia    • GERD (gastroesophageal reflux disease)    • Migraine    • Osteoporosis    • Urticaria        FAMILY HISTORY  [unfilled]    SOCIAL HISTORY  Social History     Social History   • Marital status:      Spouse name: N/A   • Number of children: N/A   • Years of education: N/A     Social History Main Topics   • Smoking status: Never Smoker   • Smokeless tobacco: Never Used   • Alcohol use Yes      Comment: occasional   • Drug use: No   • Sexual activity: Not on file     Other Topics Concern   • Not on file     Social History Narrative   • No narrative on file       SURGICAL HISTORY  Past Surgical History:   Procedure Laterality Date   • APPENDECTOMY     • OTHER ABDOMINAL SURGERY         CURRENT MEDICATIONS  Home Medications     Reviewed by Apolinar Hernández (Pharmacy Tech) on 09/26/18 at 1035  Med List Status: Complete   Medication Last Dose Status   benzonatate (TESSALON) 200 MG capsule 9/26/2018 Active   Biotin 5000 MCG Cap 9/25/2018 Active  "  cetirizine (ZYRTEC) 10 MG Tab 9/25/2018 Active   cevimeline (EVOXAC) 30 MG capsule 9/26/2018 Active   Cholecalciferol (VITAMIN D) 2000 units Cap 9/25/2018 Active   Coral Calcium 1000 (390 Ca) MG Tab 9/25/2018 Active   Cyanocobalamin (VITAMIN B-12) 1000 MCG Tab 9/24/2018 Active   doxycycline (VIBRAMYCIN) 100 MG Tab 9/26/2018 Active   DULoxetine (CYMBALTA) 60 MG Cap DR Particles delayed-release capsule 9/25/2018 Active   Frovatriptan Succinate (FROVA) 2.5 MG Tab 9/25/2018 Active   gabapentin (NEURONTIN) 400 MG Cap 9/26/2018 Active   magnesium oxide (MAG-OX) 400 MG Tab 9/25/2018 Active   meloxicam (MOBIC) 15 MG tablet 9/25/2018 Active   Menaquinone-7 (VITAMIN K2) 100 MCG Cap 9/25/2018 Active   MethylPREDNISolone (MEDROL DOSEPAK) 4 MG Tablet Therapy Pack 9/26/2018 Active   montelukast (SINGULAIR) 10 MG Tab 9/25/2018 Active   multivitamin (THERAGRAN) Tab 9/25/2018 Active   pantoprazole (PROTONIX) 40 MG Tablet Delayed Response 9/25/2018 Active   Probiotic Product (PROBIOTIC PO) 9/25/2018 Active   ranitidine (ZANTAC) 150 MG Tab 9/26/2018 Active   tizanidine (ZANAFLEX) 4 MG Tab 9/25/2018 Active                ALLERGIES  Allergies   Allergen Reactions   • Bee Venom Anaphylaxis   • Contrast Media With Iodine [Iodine] Shortness of Breath   • Econazole Anaphylaxis   • Floxin [Ofloxacin] Anaphylaxis   • Keflex Shortness of Breath   • Levaquin Shortness of Breath   • Morphine Anaphylaxis   • Naloxone Hives     \"hives, SOB\"   • Nitrofurantoin Shortness of Breath     ...and hives     • Oxycodone Shortness of Breath     ...and hives     • Penicillins Shortness of Breath     ...and hives     • Ancef [Cefazolin] Hives   • Bactrim [Sulfamethoxazole W-Trimethoprim] Shortness of Breath   • Bextra [Valdecoxib] Rash     \"Skin burn and peeling.\"   • Other Drug Hives     \"Enconazole nitrate.\" shortness of breath and hives   • Other Misc Unspecified     Adhesive tape: blisters, skin peels off   • Norco [Apap-Fd&C Yellow #10 Al " "[place]-Hydrocodone] Shortness of Breath     ...and hives     • Tygacil [Tigecycline]      itching       PHYSICAL EXAM  VITAL SIGNS: /74   Pulse 97   Temp 37.1 °C (98.7 °F)   Resp 18   Ht 1.626 m (5' 4\")   Wt 80.9 kg (178 lb 5.6 oz)   SpO2 92%   BMI 30.61 kg/m²  Room air O2: 81    Constitutional: Well developed, Well nourished, severe acute distress, Non-toxic appearance.   HENT: Normocephalic, Atraumatic, Bilateral external ears normal, Oropharynx moist, No oral exudates, Nose normal.   Eyes: PERRLA, EOMI, Conjunctiva normal, No discharge.   Neck: Normal range of motion, No tenderness, Supple, No stridor.   Lymphatic: No lymphadenopathy noted.   Cardiovascular: Normal heart rate, Normal rhythm, No murmurs, No rubs, No gallops.   Thorax & Lungs: Diminished breath sounds, patient was noted to be in respiratory distress, No wheezing, No chest tenderness.   Abdomen: Bowel sounds normal, Soft, No tenderness, No masses, No pulsatile masses.   Skin: Warm, Dry, No erythema, No rash.   Back: No tenderness, No CVA tenderness.   Extremities: Intact distal pulses, No edema, No tenderness, No cyanosis, No clubbing.   Musculoskeletal: Good range of motion in all major joints. No tenderness to palpation or major deformities noted.   Neurologic: Alert & oriented x 3, Normal motor function, Normal sensory function, No focal deficits noted.   Psychiatric: Affect normal, Judgment normal, Mood normal.     EKG  Normal sinus rhythm at 100 beats per minute, no ST elevation or depression, no widening QRS complex, poor R-wave progression, MI intervals are normal, no diagnostic Q waves are noted. This is a 12-lead EKG there is no previous EKG available dist time for comparison.    RADIOLOGY/PROCEDURES  DX-CHEST-PORTABLE (1 VIEW)   Final Result      Bronchopneumonia favored over edema            COURSE & MEDICAL DECISION MAKING  Pertinent Labs & Imaging studies reviewed. (See chart for details)   appears to be septic with " elevated lactic acid 3.7 she was given 3 L normal saline fluid here in the emergency room started on antibiotics and discussed this with the pharmacy chest x-ray showed bronchial pneumonia increased on the right which is consistent with her symptoms fact that she is hypoxic and respiratory distress secondary to this she is in severe distress secondary to this was placed and admitted to the intensive care unit. Discuss case with hospitalist.    FINAL IMPRESSION  1. Acute sepsis  2. Bronchial pneumonia bilateral lower lung  3. Respiratory distress/hypoxia secondary to  4. Critical care time 42 minutes; this included discussions with patient's family, multiple discussions with hospitalist, reviewing records discussion she will plan, interventions as discussed above. This does not include any procedures.         Electronically signed by: Librado Prescott, 9/26/2018 5:36 PM

## 2018-09-27 NOTE — CONSULTS
CONSULT NOTE  Pulmonary & Critical Care Medicine     Date of service: 9/27/2018    Requesting Physician: Dr. SUZY Jj  Reason for consult: Hemoptysis    Assessment & Plan   Hemoptysis   Assessment & Plan    This is from her right sided pneumonia  Her hemoptysis is mixed with phlegm and that is reassuring  Improved so far with treatment with broader spectrum Abx  As there is an obvious pna and clinical hx fits do not think she needs a CT scan  She has improved and with the inflammation improving, her hemoptysis should resolved  She should follow up in 2 weeks with a cxr  Discussed with Primary team and patient  Contact information for pulmonary clinic given  All questions answered              HPI    Donna Isaac is an 53 y.o. female admitted on  9/26/18 with cough * 2 weeks and was rx with steroids and doxycyline from Urgent Care.  Patient says the coughing of blood started a couple of days ago  CXR personally viewed and shows right lower lobe pna with air bronchograms  Initially it is less than 1/2 teaspoon of blood then mixed with phlegm.  Last episode was last night when she had a coughing spasm.  She has some chest pain/soreness with coughing  No fever and no night sweats  No nose bleeds  Appetite intact.  This has not happened before  She is feeling better    Blood cxs negative  No sputum yet today for assessment    On azithromycin and linelozid      Past Medical History:   Diagnosis Date   • Asthma    • Chronic pain    • Fibromyalgia    • GERD (gastroesophageal reflux disease)    • Migraine    • Osteoporosis    • Urticaria      Past Surgical History:   Procedure Laterality Date   • APPENDECTOMY     • OTHER ABDOMINAL SURGERY       Allergies   Allergen Reactions   • Bee Venom Anaphylaxis   • Contrast Media With Iodine [Iodine] Shortness of Breath   • Econazole Anaphylaxis   • Floxin [Ofloxacin] Anaphylaxis   • Keflex Shortness of Breath   • Levaquin Shortness of Breath   • Morphine Anaphylaxis   • Naloxone  "Hives     \"hives, SOB\"   • Nitrofurantoin Shortness of Breath     ...and hives     • Oxycodone Shortness of Breath     ...and hives     • Penicillins Shortness of Breath     ...and hives     • Ancef [Cefazolin] Hives   • Bactrim [Sulfamethoxazole W-Trimethoprim] Shortness of Breath   • Bextra [Valdecoxib] Rash     \"Skin burn and peeling.\"   • Other Drug Hives     \"Enconazole nitrate.\" shortness of breath and hives   • Other Misc Unspecified     Adhesive tape: blisters, skin peels off   • Norco [Apap-Fd&C Yellow #10 Al Tai-Hydrocodone] Shortness of Breath     ...and hives     • Tygacil [Tigecycline]      itching     No current facility-administered medications on file prior to encounter.      Current Outpatient Prescriptions on File Prior to Encounter   Medication Sig Dispense Refill   • doxycycline (VIBRAMYCIN) 100 MG Tab Take 1 Tab by mouth 2 times a day for 7 days. 14 Tab 0   • MethylPREDNISolone (MEDROL DOSEPAK) 4 MG Tablet Therapy Pack Take as directed on package. 1 pack. 21 Tab 0   • benzonatate (TESSALON) 200 MG capsule Take 1 Cap by mouth 3 times a day as needed. 30 Cap 0   • DULoxetine (CYMBALTA) 60 MG Cap DR Particles delayed-release capsule Take 60 mg by mouth every day.     • gabapentin (NEURONTIN) 400 MG Cap Take 400 mg by mouth 3 times a day.     • meloxicam (MOBIC) 15 MG tablet Take 15 mg by mouth every day.     • montelukast (SINGULAIR) 10 MG Tab Take 10 mg by mouth every day.     • magnesium oxide (MAG-OX) 400 MG Tab Take 400 mg by mouth every day.     • Cyanocobalamin (VITAMIN B-12) 1000 MCG Tab Take 1,000 mcg by mouth every 48 hours.     • Biotin 5000 MCG Cap Take 1 Cap by mouth every day.     • Cholecalciferol (VITAMIN D) 2000 units Cap Take 1 Cap by mouth every day.     • Probiotic Product (PROBIOTIC PO) Take 1 Tab by mouth every day.     • Menaquinone-7 (VITAMIN K2) 100 MCG Cap Take 1 Tab by mouth every day.     • tizanidine (ZANAFLEX) 4 MG Tab Take 4 mg by mouth every 6 hours as needed.     • " "cevimeline (EVOXAC) 30 MG capsule Take 30 mg by mouth 3 times a day.     • ranitidine (ZANTAC) 150 MG Tab Take 300 mg by mouth 2 times a day.     • Frovatriptan Succinate (FROVA) 2.5 MG Tab Take 1 Tab by mouth as needed. Indications: Migraine Headache     • pantoprazole (PROTONIX) 40 MG Tablet Delayed Response Take 40 mg by mouth every bedtime.     • multivitamin (THERAGRAN) Tab Take 1 Tab by mouth every day.       History reviewed. No pertinent family history.     Social History     Social History   • Marital status:      Spouse name: N/A   • Number of children: N/A   • Years of education: N/A     Occupational History   • Not on file.     Social History Main Topics   • Smoking status: Never Smoker   • Smokeless tobacco: Never Used   • Alcohol use Yes      Comment: occasional   • Drug use: No   • Sexual activity: Not on file     Other Topics Concern   • Not on file     Social History Narrative   • No narrative on file       Review of Systems   Constitutional: Negative.    HENT: Negative.    Eyes: Negative.    Respiratory: Positive for cough, hemoptysis and sputum production. Negative for shortness of breath.    Cardiovascular: Positive for chest pain.   Gastrointestinal: Negative.    Genitourinary: Negative.    Musculoskeletal: Negative.    Skin: Negative.    Neurological: Negative.    Endo/Heme/Allergies: Negative.    Psychiatric/Behavioral: Negative.           Objective     PHYSICAL EXAM:   /92   Pulse 87   Temp 36.8 °C (98.2 °F)   Resp 20   Ht 1.626 m (5' 4\")   Wt 80.9 kg (178 lb 5.6 oz)   SpO2 93%   Breastfeeding? No   BMI 30.61 kg/m²     Intake/Output Summary (Last 24 hours) at 09/27/18 1440  Last data filed at 09/27/18 0830   Gross per 24 hour   Intake              800 ml   Output                0 ml   Net              800 ml       Gen: no apparent distress, resting comfortably not using accessory muscles and speaking in full sentences  HEENT: Normocephalic atraumatic, PERRL, oropharynx " clear no erythema or exudates; Nares patents, no discharge from the eyes and no discharge from either ear  Trachea midline, no adenopathy  Cardiovascular: Regular rate and rhythm, no murmurs or rubs  Pulmonary: No apparent wheezes but egophonay at right post lung  - 2/3 post  Abdomen: Soft, nondistended, nontender, normoactive bowel sounds; no organomegaly  Gu: no reyna catheter present  Extremities: No lower extremity edema bilaterally, peripheral pulses +2 in all 4 extremities, no calf tenderness  Neuro: Alert and oriented ×3, moving all extremities equally, no focal deficits   Skin: No rash      Labs:  Lab Results   Component Value Date/Time    SODIUM 137 09/27/2018 04:20 AM    POTASSIUM 4.1 09/27/2018 04:20 AM    CHLORIDE 108 09/27/2018 04:20 AM    CO2 23 09/27/2018 04:20 AM    GLUCOSE 91 09/27/2018 04:20 AM    BUN 30 (H) 09/27/2018 04:20 AM    CREATININE 1.11 09/27/2018 04:20 AM        Lab Results   Component Value Date/Time    WBC 8.5 09/27/2018 04:20 AM    RBC 3.89 (L) 09/27/2018 04:20 AM    HEMOGLOBIN 11.6 (L) 09/27/2018 04:20 AM    HEMATOCRIT 37.1 09/27/2018 04:20 AM    MCV 95.4 09/27/2018 04:20 AM    MCH 29.8 09/27/2018 04:20 AM    MCHC 31.3 (L) 09/27/2018 04:20 AM    MPV 12.0 09/27/2018 04:20 AM    NEUTSPOLYS 67.90 09/27/2018 04:20 AM    LYMPHOCYTES 22.10 09/27/2018 04:20 AM    MONOCYTES 8.60 09/27/2018 04:20 AM    EOSINOPHILS 0.90 09/27/2018 04:20 AM    BASOPHILS 0.40 09/27/2018 04:20 AM      Lab Results   Component Value Date/Time    PROTHROMBTM 13.5 09/27/2018 01:38 PM    INR 1.04 09/27/2018 01:38 PM      Maikel Yao

## 2018-09-27 NOTE — DISCHARGE PLANNING
Admitted for SOB. Does not wear home O2, on 2 L NC. RN to titrate pt to RA.     Pending PMA consult and CT.

## 2018-09-27 NOTE — PROGRESS NOTES
Patient alert and oriented. Patient assessed and all needs met. Patient denies pain. Expiratory wheezes noted upon assessment-productive cough also present. Patient on [id number] to wean down. Upon ambulation, patient's spO2 dropped to 79%. Patient placed on 6LO2 for ambulation, then 3LO3 when back in bed. Patient denies SOB upon ambulation despite SpO2. Patient walking in hallway with assistance and patient up to chair for meals. Patient using incentive spirometer. Receiving breathing treatments scheduled. On antibiotics. All belongings and call light within reach. Monitoring to continue.

## 2018-09-27 NOTE — PROGRESS NOTES
Received report from day shift nurse. Assumed pt care at 1915. Pt is A&Ox4, resting comfortably in bed. Pt on 3L of oxygen via nasal cannula; however pt found to wheezing breathing and SOB; called RT for pt's respiratory status. Labs noted, VSS. Pt c/o no pain at this moment. Will returns for morning meds and assessment. Fall precautions in place. Bed at lowest position. Call light and personal belongings within reach. Continue to monitor L of oxygen via nasal cannula

## 2018-09-27 NOTE — CARE PLAN
Problem: Bronchoconstriction:  Goal: Improve in air movement and diminished wheezing  Outcome: PROGRESSING AS EXPECTED  Started duoneb treatments per protocol, patient wheezing improved post treatment.  Intervention: Implement inhaled treatments  Started duoneb treatments QID and PRN per Protocol Grid  Intervention: Evaluate and manage medication effects  Will continue to assess duoneb treatments, and manage per protocol

## 2018-09-27 NOTE — FLOWSHEET NOTE
09/27/18 1027   Interdisciplinary Plan of Care-Goals (Indications)   Bronchodilator Indications History / Diagnosis   RT Assessment of Delivered Medications   Evaluation of Medication Delivery Daily Yes-- Pt /Family has been Instructed in use of Respiratory Medications and Adverse Reactions   SVN Group   #SVN Performed Yes   Given By: Mouthpiece   Incentive Spirometry Group   Breathing Exercises Yes   Incentive Spirometer Volume 1200 mL   Chest Exam   Work Of Breathing / Effort Mild   Respiration 20   Pulse 82   Breath Sounds   Pre/Post Intervention (slightly more coarse after tx)   RUL Breath Sounds Clear   RML Breath Sounds Crackles   RLL Breath Sounds Crackles;Diminished   TRISTAN Breath Sounds Clear   LLL Breath Sounds Crackles;Diminished   Oximetry   Continuous Oximetry Yes   Oxygen   Home O2 Use Prior To Admission? No   Pulse Oximetry 96 %   O2 (LPM) 3   O2 Daily Delivery Respiratory  Silicone Nasal Cannula

## 2018-09-27 NOTE — CARE PLAN
Problem: Oxygenation:  Goal: Maintain adequate oxygenation dependent on patient condition  Outcome: PROGRESSING AS EXPECTED  Adjust oxygen via nasal cannula, and oxymask to maintain adequate oxygenation  Intervention: Levels of oxygenation will improve to baseline  Titrate oxygen administration as tolerated per protocol to home baseline of room air.

## 2018-09-27 NOTE — CARE PLAN
Problem: Bronchoconstriction:  Goal: Improve in air movement and diminished wheezing    Intervention: Evaluate and manage medication effects  Patient receiving Duoneb nebulizer QID with incentive spirometry.  Breath sounds vary with coarse crackles and occasional wheeze to nearly clear breath sounds at end of shift.  Patient does have a history of asthma with PRN symbicort (during asthma flares) and PRN albuterol MDI's.  Incentive spirometry [id number].  Patient requesting nebulizer treatment early this morning.      Problem: Oxygenation:  Goal: Maintain adequate oxygenation dependent on patient condition  Oxygen weaned to 3 L nasal cannula with oximetry 93-96%,  At 1515, pt off O2 walking to restroom with room air saturation at approximately 87%.  Back on 3 L O2.  Patient is difficult at times to get oximetry reading as her hands are always cold.

## 2018-09-27 NOTE — FLOWSHEET NOTE
09/27/18 0639   Interdisciplinary Plan of Care-Goals (Indications)   Bronchodilator Indications History / Diagnosis   Interdisciplinary Plan of Care-Outcomes    Bronchodilator Outcome Diminished Wheezing and Volume of Air Movement Increased   Education   Education Yes - Pt. / Family has been Instructed in use of Respiratory Medications and Adverse Reactions   SVN Group   #SVN Performed Yes   Given By: Mouthpiece   Respiratory WDL   Respiratory (WDL) X   Chest Exam   Respiration 17   Pulse 91   Breath Sounds   Pre/Post Intervention Pre Intervention Assessment   RUL Breath Sounds Coarse Crackles   RML Breath Sounds Coarse Crackles   RLL Breath Sounds Diminished   TRISTAN Breath Sounds Coarse Crackles   LLL Breath Sounds Clear   Secretions   Cough Moist;Non Productive   Oximetry   #Pulse Oximetry (Single Determination) Yes   Oxygen   Pulse Oximetry 92 %   O2 (LPM) 3   O2 Daily Delivery Respiratory  OxyMask

## 2018-09-27 NOTE — PROGRESS NOTES
[facility] Hospitalist Progress Note    Date of Service: 2018    Chief Complaint  53 y.o. female admitted 2018 with SOB and hemoptysis    Interval Problem Update  Patient doing ok ,still SOB and having hemoptysis, now with some productive sputum, no fevers or chills. Never smoked, no prior hx of hemoptysis  Blood is about quarter size  No dizziness    Dc ppx heparin, scds for now given hemoptysis    Consultants/Specialty  Pulmonary Dr. garcia    Disposition  tbd        Review of Systems   Constitutional: Negative for fever and malaise/fatigue.   HENT: Negative for congestion, hearing loss, nosebleeds and sinus pain.    Eyes: Negative for blurred vision and double vision.   Respiratory: Positive for cough, hemoptysis, sputum production and shortness of breath.    Cardiovascular: Negative for chest pain, palpitations and orthopnea.   Gastrointestinal: Negative for abdominal pain, diarrhea, heartburn and vomiting.   Genitourinary: Negative for dysuria, frequency and urgency.   Musculoskeletal: Negative for back pain, myalgias and neck pain.   Skin: Negative for itching and rash.   Neurological: Negative for dizziness, tingling, weakness and headaches.   Psychiatric/Behavioral: Negative for depression and suicidal ideas.      Physical Exam  Laboratory/Imaging   Hemodynamics  Temp (24hrs), Av.5 °C (97.7 °F), Min:36.3 °C (97.4 °F), Max:36.8 °C (98.3 °F)   Temperature: 36.3 °C (97.4 °F)  Pulse  Av.6  Min: 82  Max: 120 Heart Rate (Monitored): (!) 106  Blood Pressure: 158/108      Respiratory      Respiration: 20, Pulse Oximetry: 96 %, O2 Daily Delivery Respiratory : Silicone Nasal Cannula     Given By:: Mouthpiece, Work Of Breathing / Effort: Mild  RUL Breath Sounds: Clear, RML Breath Sounds: Crackles, RLL Breath Sounds: Crackles;Diminished, TRISTAN Breath Sounds: Clear, LLL Breath Sounds: Crackles;Diminished    Fluids  No intake or output data in the 24 hours ending 18 1210    Nutrition  Orders Placed This  Encounter   Procedures   • Diet Order Regular     Standing Status:   Standing     Number of Occurrences:   1     Order Specific Question:   Diet:     Answer:   Regular [1]     Physical Exam   Constitutional: She is oriented to person, place, and time. She appears well-developed and well-nourished. No distress.   HENT:   Head: Normocephalic and atraumatic.   Mouth/Throat: No oropharyngeal exudate.   Eyes: Pupils are equal, round, and reactive to light. Conjunctivae and EOM are normal. No scleral icterus.   Neck: Neck supple. No JVD present. No thyromegaly present.   Cardiovascular: Normal rate, regular rhythm and intact distal pulses.    No murmur heard.  Pulmonary/Chest: Effort normal. No respiratory distress. She has no wheezes. She exhibits no tenderness.   Diminished breath sounds b/l   Abdominal: Soft. Bowel sounds are normal. She exhibits no distension. There is no tenderness. There is no rebound.   Musculoskeletal: Normal range of motion. She exhibits no edema or tenderness.   Lymphadenopathy:     She has no cervical adenopathy.   Neurological: She is alert and oriented to person, place, and time. She has normal reflexes. She exhibits normal muscle tone.   Skin: Skin is warm and dry. No erythema.   Psychiatric: She has a normal mood and affect. Her behavior is normal.       Recent Labs      09/26/18   0922 09/27/18 0420   WBC  15.7*  8.5   RBC  5.01  3.89*   HEMOGLOBIN  14.8  11.6*   HEMATOCRIT  45.3  37.1   MCV  90.4  95.4   MCH  29.5  29.8   MCHC  32.7*  31.3*   RDW  49.1  53.6*   PLATELETCT  258  172   MPV  11.2  12.0     Recent Labs      09/26/18   0922  09/27/18   0420   SODIUM  138  137   POTASSIUM  3.3*  4.1   CHLORIDE  107  108   CO2  22  23   GLUCOSE  141*  91   BUN  16  30*   CREATININE  1.05  1.11   CALCIUM  9.4  8.6             Recent Labs      09/27/18   0420   TRIGLYCERIDE  76   HDL  98   LDL  45          Assessment/Plan     * Sepsis (HCC)   Assessment & Plan    This is severe sepsis with the  following associated acute organ dysfunction(s): acute respiratory failure.   Tachycardia, HTN, white count and new O2 requirement  Likely 2/2 PNA  Treat with abx  RT protocol  Wean O2  Cultures pending  Wean O2        Hemoptysis   Assessment & Plan    Continues to have small amounts while coughing, cough is most dry, sputum production at times  No recent travels, no exposure to TB, nonsmoking, no weight loss, never prior episodes  At this time suspect it is 2/2 to acute PNA, however will order labs including renal function, coag studies, Autoimmune serology  Will send for sputum culture  Monitor hgb- stable currently 11.6 however dropped from >14, not on fluids either  Consulted Dr. Newman pulmonology  Will obtain a chest CT at this time as well        Acute respiratory failure with hypoxia (HCC)   Assessment & Plan    2/2 acute PNA  Treat as above, pending cultures  Wean o2        CAP (community acquired pneumonia)   Assessment & Plan    Failed outpatient t/m  Continue abx here  Monitor cultures  RT protocol  Wean O2        Hypokalemia   Assessment & Plan    resolved        Lactic acid acidosis   Assessment & Plan    resolved          Essential hypertension   Assessment & Plan    Uncontrolled with high BP on admission  Continue home meds and adding PRNs          Quality-Core Measures   Reviewed items::  Labs reviewed, Medications reviewed and Radiology images reviewed  Perez catheter::  No Perez  Antibiotics:  Treating active infection/contamination beyond 24 hours perioperative coverage

## 2018-09-27 NOTE — FLOWSHEET NOTE
09/26/18 2115   Events/Summary/Plan   Events/Summary/Plan Called by RN, patient SOB   Non-Invasive Resp Device Site Inspection Completed Intact   Interdisciplinary Plan of Care-Goals (Indications)   Bronchodilator Indications History / Diagnosis   Interdisciplinary Plan of Care-Outcomes    Bronchodilator Outcome Diminished Wheezing and Volume of Air Movement Increased   Education   Education Yes - Pt. / Family has been Instructed in use of Respiratory Equipment;Yes - Pt. / Family has been Instructed in use of Respiratory Medications and Adverse Reactions   SVN Group   #SVN Performed Yes   Date SVN Last Changed 09/26/18   Date SVN Next Change Due (Q 7 Days) 10/26/18   Respiratory WDL   Respiratory (WDL) X   Chest Exam   Respiration (!) 25   Pulse 88   Breath Sounds   Pre/Post Intervention Pre Intervention Assessment   RUL Breath Sounds Expiratory Wheezes;Crackles   TRISTAN Breath Sounds Expiratory Wheezes;Crackles   Secretions   Cough Dry   Oximetry   #Pulse Oximetry (Single Determination) Yes   Oxygen   Home O2 Use Prior To Admission? No   Pulse Oximetry 94 %  (sats improved with oximask, nares occluded with nc 80%)   O2 (LPM) 12   O2 Daily Delivery Respiratory  OxyMask

## 2018-09-27 NOTE — FLOWSHEET NOTE
09/26/18 2119   Type of Assessment   Reassessment Yes   Patient History   Pulmonary Diagnosis PNA   Home O2 No   COPD Risk Screening   Do you have a history of COPD? No   COPD Population Screener   During the past 4 weeks, how much did you feel short of breath? 0   Do you ever cough up any mucus or phlegm? 0   In the past 12 months, you do less than you used to because of your breathing problems 0   Have you smoked at least 100 cigarettes in your entire life? 0   How old are you? 1   COPD Screening Score 1   Smoking History   Have you ever smoked Never   Respiratory WDL   Respiratory (WDL) X   Chest Exam   Respiration (!) 25   Pulse 88   Breath Sounds   Pre/Post Intervention Pre Intervention Assessment   RUL Breath Sounds Expiratory Wheezes;Crackles   TRISTAN Breath Sounds Expiratory Wheezes;Crackles   Secretions   Cough Dry   Oximetry   #Pulse Oximetry (Single Determination) Yes   Oxygen   Home O2 Use Prior To Admission? No   Pulse Oximetry 94 %  (sats improved with oximask, nares occluded with nc 80%)   O2 (LPM) 12   O2 Daily Delivery Respiratory  OxyMask   Bronchodilator Protocol   Med Order With RCP No   Is this an exacerbation of COPD/Asthma? No   Bronchodilator Indications History / Diagnosis   Breath Sounds Criteria 1    Benefit Criteria 1    Pulse Criteria 0    Respiratory Rate Criteria 2    S.O.B. Criteria 1    Criteria Total 5   Point Values 4-6 - QID and PRN   Bronchodilator Goals/Outcome Diminished Wheezing and Volume of Air Movement Increased

## 2018-09-28 ENCOUNTER — PATIENT OUTREACH (OUTPATIENT)
Dept: HEALTH INFORMATION MANAGEMENT | Facility: OTHER | Age: 53
End: 2018-09-28

## 2018-09-28 PROBLEM — I10 ESSENTIAL HYPERTENSION: Chronic | Status: ACTIVE | Noted: 2018-09-26

## 2018-09-28 PROBLEM — R04.2 HEMOPTYSIS: Status: ACTIVE | Noted: 2018-09-28

## 2018-09-28 PROBLEM — E87.20 LACTIC ACID ACIDOSIS: Status: RESOLVED | Noted: 2018-09-26 | Resolved: 2018-09-28

## 2018-09-28 PROBLEM — R04.2 HEMOPTYSIS: Status: RESOLVED | Noted: 2018-09-26 | Resolved: 2018-09-28

## 2018-09-28 PROBLEM — I50.43 ACUTE ON CHRONIC COMBINED SYSTOLIC AND DIASTOLIC CONGESTIVE HEART FAILURE (HCC): Status: ACTIVE | Noted: 2018-09-28

## 2018-09-28 PROBLEM — A41.9 SEPSIS (HCC): Status: RESOLVED | Noted: 2018-09-26 | Resolved: 2018-09-28

## 2018-09-28 PROBLEM — J96.01 ACUTE RESPIRATORY FAILURE WITH HYPOXIA (HCC): Status: RESOLVED | Noted: 2018-09-26 | Resolved: 2018-09-28

## 2018-09-28 PROBLEM — I50.23 ACUTE ON CHRONIC SYSTOLIC CONGESTIVE HEART FAILURE (HCC): Status: ACTIVE | Noted: 2018-09-28

## 2018-09-28 LAB — ANGIOTENSIN II 5912: 30 NG/L

## 2018-09-28 PROCEDURE — 700102 HCHG RX REV CODE 250 W/ 637 OVERRIDE(OP): Performed by: HOSPITALIST

## 2018-09-28 PROCEDURE — A9270 NON-COVERED ITEM OR SERVICE: HCPCS | Performed by: HOSPITALIST

## 2018-09-28 PROCEDURE — 700101 HCHG RX REV CODE 250: Performed by: HOSPITALIST

## 2018-09-28 PROCEDURE — 94760 N-INVAS EAR/PLS OXIMETRY 1: CPT

## 2018-09-28 PROCEDURE — 700111 HCHG RX REV CODE 636 W/ 250 OVERRIDE (IP): Performed by: HOSPITALIST

## 2018-09-28 PROCEDURE — 99232 SBSQ HOSP IP/OBS MODERATE 35: CPT | Performed by: HOSPITALIST

## 2018-09-28 PROCEDURE — 94640 AIRWAY INHALATION TREATMENT: CPT

## 2018-09-28 PROCEDURE — 770006 HCHG ROOM/CARE - MED/SURG/GYN SEMI*

## 2018-09-28 RX ORDER — AZITHROMYCIN 250 MG/1
250 TABLET, FILM COATED ORAL DAILY
Qty: 3 TAB | Refills: 0 | Status: SHIPPED | OUTPATIENT
Start: 2018-09-28 | End: 2018-10-01

## 2018-09-28 RX ORDER — FUROSEMIDE 20 MG/1
20 TABLET ORAL
Status: DISCONTINUED | OUTPATIENT
Start: 2018-09-28 | End: 2018-09-29

## 2018-09-28 RX ORDER — LINEZOLID 600 MG/1
600 TABLET, FILM COATED ORAL EVERY 12 HOURS
Qty: 20 TAB | Refills: 0 | Status: SHIPPED | OUTPATIENT
Start: 2018-09-28 | End: 2018-12-19

## 2018-09-28 RX ADMIN — OMEPRAZOLE 20 MG: 20 CAPSULE, DELAYED RELEASE ORAL at 20:16

## 2018-09-28 RX ADMIN — MONTELUKAST SODIUM 10 MG: 10 TABLET, FILM COATED ORAL at 05:17

## 2018-09-28 RX ADMIN — LINEZOLID 600 MG: 600 TABLET, FILM COATED ORAL at 05:16

## 2018-09-28 RX ADMIN — IPRATROPIUM BROMIDE AND ALBUTEROL SULFATE 3 ML: .5; 3 SOLUTION RESPIRATORY (INHALATION) at 15:09

## 2018-09-28 RX ADMIN — FAMOTIDINE 20 MG: 20 TABLET ORAL at 05:17

## 2018-09-28 RX ADMIN — DULOXETINE HYDROCHLORIDE 60 MG: 30 CAPSULE, DELAYED RELEASE ORAL at 05:17

## 2018-09-28 RX ADMIN — FAMOTIDINE 20 MG: 20 TABLET ORAL at 17:21

## 2018-09-28 RX ADMIN — Medication 400 MG: at 05:18

## 2018-09-28 RX ADMIN — METOPROLOL SUCCINATE 25 MG: 25 TABLET, EXTENDED RELEASE ORAL at 06:00

## 2018-09-28 RX ADMIN — IPRATROPIUM BROMIDE AND ALBUTEROL SULFATE 3 ML: .5; 3 SOLUTION RESPIRATORY (INHALATION) at 18:16

## 2018-09-28 RX ADMIN — AZITHROMYCIN MONOHYDRATE 250 MG: 250 TABLET ORAL at 05:16

## 2018-09-28 RX ADMIN — GABAPENTIN 400 MG: 400 CAPSULE ORAL at 05:17

## 2018-09-28 RX ADMIN — KETOROLAC TROMETHAMINE 30 MG: 30 INJECTION, SOLUTION INTRAMUSCULAR at 12:26

## 2018-09-28 RX ADMIN — IPRATROPIUM BROMIDE AND ALBUTEROL SULFATE 3 ML: .5; 3 SOLUTION RESPIRATORY (INHALATION) at 11:30

## 2018-09-28 RX ADMIN — GABAPENTIN 400 MG: 400 CAPSULE ORAL at 17:16

## 2018-09-28 RX ADMIN — GABAPENTIN 400 MG: 400 CAPSULE ORAL at 12:26

## 2018-09-28 RX ADMIN — LINEZOLID 600 MG: 600 TABLET, FILM COATED ORAL at 17:17

## 2018-09-28 RX ADMIN — FUROSEMIDE 20 MG: 20 TABLET ORAL at 17:16

## 2018-09-28 RX ADMIN — IPRATROPIUM BROMIDE AND ALBUTEROL SULFATE 3 ML: .5; 3 SOLUTION RESPIRATORY (INHALATION) at 07:25

## 2018-09-28 ASSESSMENT — ENCOUNTER SYMPTOMS
ABDOMINAL PAIN: 0
WEAKNESS: 0
MYALGIAS: 0
SINUS PAIN: 0
NECK PAIN: 0
DIARRHEA: 0
HEMOPTYSIS: 0
HEARTBURN: 0
SPUTUM PRODUCTION: 1
DEPRESSION: 0
BACK PAIN: 0
ORTHOPNEA: 0
HEADACHES: 0
FEVER: 0
COUGH: 1
VOMITING: 0
SHORTNESS OF BREATH: 1
BLURRED VISION: 0
PALPITATIONS: 0
DOUBLE VISION: 0
TINGLING: 0
DIZZINESS: 0

## 2018-09-28 ASSESSMENT — PATIENT HEALTH QUESTIONNAIRE - PHQ9
2. FEELING DOWN, DEPRESSED, IRRITABLE, OR HOPELESS: NOT AT ALL
SUM OF ALL RESPONSES TO PHQ9 QUESTIONS 1 AND 2: 0
1. LITTLE INTEREST OR PLEASURE IN DOING THINGS: NOT AT ALL

## 2018-09-28 ASSESSMENT — PAIN SCALES - GENERAL
PAINLEVEL_OUTOF10: 4
PAINLEVEL_OUTOF10: 2

## 2018-09-28 NOTE — PROGRESS NOTES
2330 Pt reported SOB due to persistent cough, pt O2 sat at 90% on 3L of O2. Reispitation is 22. Called respiratory for Duoneb treatment. Encouraged pt to sit upright in bed and drink adequate fluids.

## 2018-09-28 NOTE — FLOWSHEET NOTE
09/27/18 1823   Interdisciplinary Plan of Care-Goals (Indications)   Bronchodilator Indications History / Diagnosis   Interdisciplinary Plan of Care-Outcomes    Bronchodilator Outcome Improved Patient Appearance with Decreased use of Accessory Muscles   Education   Education Yes - Pt. / Family has been Instructed in use of Respiratory Equipment;Yes - Pt. / Family has been Instructed in use of Respiratory Medications and Adverse Reactions   RT Assessment of Delivered Medications   Evaluation of Medication Delivery Daily Yes-- Pt /Family has been Instructed in use of Respiratory Medications and Adverse Reactions   SVN Group   #SVN Performed Yes   Given By: Mouthpiece   Incentive Spirometry Group   Incentive Spirometry Instruction Yes   Breathing Exercises Yes   Incentive Spirometer Volume 1000 mL   Chest Exam   Respiration 16   Pulse 80   Breath Sounds   Pre/Post Intervention Pre Intervention Assessment   RUL Breath Sounds Clear   TRISTAN Breath Sounds Clear   Oximetry   #Pulse Oximetry (Single Determination) Yes   Oxygen   Home O2 Use Prior To Admission? No   Pulse Oximetry 94 %   O2 (LPM) 3   O2 Daily Delivery Respiratory  Silicone Nasal Cannula

## 2018-09-28 NOTE — PROGRESS NOTES
1900 Received report from AILYN Martell, POC discussed, safety measure kept in place.    1910 pt is resting well in bed, alert and oriented. Denies SOB/ nor nausea. Clear breath sound heard upon auscultation. Pt on 3L of O2 with saturation of 92%. Encouraged to continue the use of IS, current IS volume at 1000. Rated pain when lying down in a scale of 3/10. Said it was her chest due to coughing. POC discussed such as medications and diet. Ensure safety measure kept in place.     pt was able to go to the bathroom, verbalized fatigue with movements. Will contiue to monitor pt in a timely manner.

## 2018-09-28 NOTE — PROGRESS NOTES
[facility] Hospitalist Progress Note    Date of Service: 2018    Chief Complaint  53 y.o. female admitted 2018 with SOB and hemoptysis    Interval Problem Update  Patient was on 2L NC this morning, otherwise feeling ok but still SOB while ambulating, tired after walking, 3L on ambulating, not at baseline    Will get home O2 evaluation  Consultants/Specialty  Pulmonary Dr. garcia    Disposition  tbd        Review of Systems   Constitutional: Negative for fever and malaise/fatigue.   HENT: Negative for congestion, hearing loss, nosebleeds and sinus pain.    Eyes: Negative for blurred vision and double vision.   Respiratory: Positive for cough, sputum production and shortness of breath. Negative for hemoptysis.    Cardiovascular: Negative for chest pain, palpitations and orthopnea.   Gastrointestinal: Negative for abdominal pain, diarrhea, heartburn and vomiting.   Genitourinary: Negative for dysuria, frequency and urgency.   Musculoskeletal: Negative for back pain, myalgias and neck pain.   Skin: Negative for itching and rash.   Neurological: Negative for dizziness, tingling, weakness and headaches.   Psychiatric/Behavioral: Negative for depression and suicidal ideas.      Physical Exam  Laboratory/Imaging   Hemodynamics  Temp (24hrs), Av.9 °C (98.4 °F), Min:36.8 °C (98.2 °F), Max:36.9 °C (98.5 °F)   Temperature: 36.8 °C (98.2 °F)  Pulse  Av.4  Min: 76  Max: 120   Blood Pressure: 123/75 (Recheck, RN Notified)      Respiratory      Respiration: (!) 24, Pulse Oximetry: 91 %, O2 Daily Delivery Respiratory : Silicone Nasal Cannula     Given By:: Mouthpiece, Work Of Breathing / Effort: Mild  RUL Breath Sounds: Coarse Crackles, RML Breath Sounds: Fine Crackles, RLL Breath Sounds: Fine Crackles, TRISTAN Breath Sounds: Coarse Crackles, LLL Breath Sounds: Fine Crackles    Fluids    Intake/Output Summary (Last 24 hours) at 18 1640  Last data filed at 18 1957   Gross per 24 hour   Intake              240  ml   Output                0 ml   Net              240 ml       Nutrition  Orders Placed This Encounter   Procedures   • Diet Order Regular     Standing Status:   Standing     Number of Occurrences:   1     Order Specific Question:   Diet:     Answer:   Regular [1]     Physical Exam   Constitutional: She is oriented to person, place, and time. She appears well-developed and well-nourished. No distress.   HENT:   Head: Normocephalic and atraumatic.   Mouth/Throat: No oropharyngeal exudate.   Eyes: Pupils are equal, round, and reactive to light. Conjunctivae and EOM are normal. No scleral icterus.   Neck: Neck supple. No JVD present. No thyromegaly present.   Cardiovascular: Normal rate, regular rhythm and intact distal pulses.    No murmur heard.  Pulmonary/Chest: Effort normal. No respiratory distress. She has no wheezes. She exhibits no tenderness.   Diminished breath sounds b/l   Abdominal: Soft. Bowel sounds are normal. She exhibits no distension. There is no tenderness. There is no rebound.   Musculoskeletal: Normal range of motion. She exhibits no edema or tenderness.   Lymphadenopathy:     She has no cervical adenopathy.   Neurological: She is alert and oriented to person, place, and time. She has normal reflexes. She exhibits normal muscle tone.   Skin: Skin is warm and dry. No erythema.   Psychiatric: She has a normal mood and affect. Her behavior is normal.       Recent Labs      09/26/18   0922 09/27/18   0420   WBC  15.7*  8.5   RBC  5.01  3.89*   HEMOGLOBIN  14.8  11.6*   HEMATOCRIT  45.3  37.1   MCV  90.4  95.4   MCH  29.5  29.8   MCHC  32.7*  31.3*   RDW  49.1  53.6*   PLATELETCT  258  172   MPV  11.2  12.0     Recent Labs      09/26/18   0922  09/27/18   0420   SODIUM  138  137   POTASSIUM  3.3*  4.1   CHLORIDE  107  108   CO2  22  23   GLUCOSE  141*  91   BUN  16  30*   CREATININE  1.05  1.11   CALCIUM  9.4  8.6     Recent Labs      09/27/18   1338   INR  1.04     Recent Labs      09/27/18   1338    BNPBTYPENAT  1359*     Recent Labs      09/27/18   0420   TRIGLYCERIDE  76   HDL  98   LDL  45          Assessment/Plan     Acute on chronic systolic congestive heart failure (HCC)   Assessment & Plan    As noted on echo, EF 55%  Monitor  HF fu outpatient        Hemoptysis   Assessment & Plan    2/2 acute infection, Pulmonology Dr. Newman would like her to fu outpatient after acute infection resolved  hgb stable        Acute respiratory failure with hypoxia (HCC)   Assessment & Plan    2/2 acute PNA  Treat as above, cultures neg  Needs O2 evaluation and home O2 set up   Wean o2        CAP (community acquired pneumonia)   Assessment & Plan    Failed outpatient t/m  Continue abx here  bcx neg  RT protocol  Wean O2  home O2 eval ordered        Hypokalemia   Assessment & Plan    resolved        Essential hypertension   Assessment & Plan    Uncontrolled continue home meds  Monitor BP          Quality-Core Measures   Reviewed items::  Labs reviewed, Medications reviewed and Radiology images reviewed  Perez catheter::  No Perez  Antibiotics:  Treating active infection/contamination beyond 24 hours perioperative coverage      Discussed with patient, nursing, Pulmonology and SW about plan of care  Home o2 eval today and ordered

## 2018-09-28 NOTE — DISCHARGE PLANNING
Anticipated Discharge Disposition: Home with spouse when medically cleared.     Action: Patient discussed in afternoon rounds.  IDT informed that patient O2 needs have increased today.  Patient not medically cleared to discharge at this time.     Barriers to Discharge: May need home O2.    Plan: Sw to contine to monitor and assist as needed.

## 2018-09-28 NOTE — CARE PLAN
Problem: Safety  Goal: Will remain free from injury  Outcome: PROGRESSING AS EXPECTED  Assess pt's gait and mobility. Ensure safety measures in place such as call light within reach, room must be well lit and free from clutter. Upper bed rails up.    Problem: Respiratory:  Goal: Respiratory status will improve  Outcome: PROGRESSING AS EXPECTED  Patient on 3L of 02, titrate oxygen based on pt's oxygen saturation. Assess pt for any signs and symptoms of SOB/. Encourage and educate pt on the use of IS and proper coughing and deep breathing technique.

## 2018-09-28 NOTE — ASSESSMENT & PLAN NOTE
2/2 acute infection, Pulmonology Dr. Newman would like her to fu outpatient after acute infection resolved  hgb stable

## 2018-09-28 NOTE — FLOWSHEET NOTE
This morning patient was doing well with 1.5 L nasal cannula.  After walking and coughing, she is tired; O2 increased to 3 L with oxygen saturation 90% at rest.     09/28/18 1150   RT Assessment of Delivered Medications   Evaluation of Medication Delivery Daily Yes-- Pt /Family has been Instructed in use of Respiratory Medications and Adverse Reactions   SVN Group   #SVN Performed Yes   Given By: Mouthpiece   Chest Exam   Work Of Breathing / Effort Mild;Increased Work of Breathing   Respiration (!) 22   Pulse (!) 103   Breath Sounds   RUL Breath Sounds Clear   RML Breath Sounds Diminished   RLL Breath Sounds Diminished   TRISTAN Breath Sounds Clear   LLL Breath Sounds Diminished   Secretions   Cough Strong;Non Productive   Oxygen   Pulse Oximetry 90 %   O2 (LPM) 3   O2 Daily Delivery Respiratory  Silicone Nasal Cannula

## 2018-09-28 NOTE — FLOWSHEET NOTE
09/28/18 0725   Interdisciplinary Plan of Care-Goals (Indications)   Bronchodilator Indications History / Diagnosis   Education   Education Yes - Pt. / Family has been Instructed in use of Respiratory Equipment;Yes - Pt. / Family has been Instructed in use of Respiratory Medications and Adverse Reactions   RT Assessment of Delivered Medications   Evaluation of Medication Delivery Daily Yes-- Pt /Family has been Instructed in use of Respiratory Medications and Adverse Reactions   SVN Group   #SVN Performed Yes   Given By: Mouthpiece   Incentive Spirometry Group   Incentive Spirometer Volume 1200 mL  (2842-6362)   Chest Exam   Respiration 18   Pulse 76   Breath Sounds   RUL Breath Sounds Clear   RML Breath Sounds Clear;Diminished   RLL Breath Sounds Diminished;Fine Crackles   TRISTAN Breath Sounds Clear   LLL Breath Sounds Diminished;Fine Crackles   Oxygen   Home O2 Use Prior To Admission? No   Pulse Oximetry 92 %   O2 (LPM) 1.5   O2 Daily Delivery Respiratory  Silicone Nasal Cannula

## 2018-09-28 NOTE — FACE TO FACE
Face to Face Note  -  Durable Medical Equipment    Corazon Jj M.D. - NPI: [id number]  I certify that this patient is under my care and that they had a durable medical equipment(DME)face to face encounter by myself that meets the physician DME face-to-face encounter requirements with this patient on:    Date of encounter:   Patient:                    MRN:                       YOB: 2019  Donna Isaac  [id number]  1965     The encounter with the patient was in whole, or in part, for the following medical condition, which is the primary reason for durable medical equipment:  Other - CHF    I certify that, based on my findings, the following durable medical equipment is medically necessary:  Other - Pneumonia.  CHF  HOME O2 Saturation Measurements:(Values must be present for Home Oxygen orders)  Room air sat at rest: 87  Room air sat with amb: 85  With liters of O2: 3, O2 sat at rest with O2: 90  With Liters of O2: 1.5, O2 sat with amb with O2 : 95  Is the patient mobile?: Yes    My Clinical findings support the need for the above equipment due to:  Hypoxia    Supporting Symptoms: sob, hypoxia

## 2018-09-28 NOTE — FLOWSHEET NOTE
09/28/18 1509   Interdisciplinary Plan of Care-Goals (Indications)   Bronchodilator Indications Physical Exam / Hyperinflation / Wheezing (bronchospasm);History / Diagnosis   RT Assessment of Delivered Medications   Evaluation of Medication Delivery Daily Yes-- Pt /Family has been Instructed in use of Respiratory Medications and Adverse Reactions   SVN Group   #SVN Performed Yes   Given By: Mouthpiece   Respiratory WDL   Respiratory (WDL) X   Chest Exam   Work Of Breathing / Effort Mild   Respiration (!) 24   Pulse 94   Breath Sounds   RUL Breath Sounds Coarse Crackles   RML Breath Sounds Fine Crackles   RLL Breath Sounds Fine Crackles   TRISTAN Breath Sounds Coarse Crackles   LLL Breath Sounds Fine Crackles   Secretions   Cough Non Productive  (says she coughed up a little blood earlier)   Oximetry   #Pulse Oximetry (Single Determination) Yes   Oxygen   Home O2 Use Prior To Admission? No   Pulse Oximetry 91 %   O2 (LPM) 2   O2 Daily Delivery Respiratory  Silicone Nasal Cannula

## 2018-09-28 NOTE — FLOWSHEET NOTE
09/27/18 3530   Interdisciplinary Plan of Care-Goals (Indications)   Bronchodilator Indications History / Diagnosis   Interdisciplinary Plan of Care-Outcomes    Bronchodilator Outcome Diminished Wheezing and Volume of Air Movement Increased   Education   Education Yes - Pt. / Family has been Instructed in use of Respiratory Equipment;Yes - Pt. / Family has been Instructed in use of Respiratory Medications and Adverse Reactions   RT Assessment of Delivered Medications   Evaluation of Medication Delivery Daily Yes-- Pt /Family has been Instructed in use of Respiratory Medications and Adverse Reactions   SVN Group   #SVN Performed Yes   Given By: Mouthpiece   Chest Exam   Work Of Breathing / Effort Mild   Respiration 18   Pulse 89   Breath Sounds   Pre/Post Intervention Pre Intervention Assessment   RUL Breath Sounds Expiratory Wheezes   TRISTAN Breath Sounds Expiratory Wheezes   Oximetry   #Pulse Oximetry (Single Determination) Yes   Continuous Oximetry Yes   Oxygen   Home O2 Use Prior To Admission? No   Pulse Oximetry 94 %   O2 (LPM) 3   O2 Daily Delivery Respiratory  Silicone Nasal Cannula

## 2018-09-28 NOTE — FLOWSHEET NOTE
Oxygen increased to 4 L nasal cannula.  MD notified in rounds that O2 demands went up this afternoon.     09/28/18 1330   Chest Exam   Work Of Breathing / Effort Intercostal Retraction;Mild   Respiration (!) 24   Pulse (!) 102   Oxygen   Pulse Oximetry 92 %   O2 (LPM) 4   O2 Daily Delivery Respiratory  Silicone Nasal Cannula

## 2018-09-29 VITALS
BODY MASS INDEX: 30.45 KG/M2 | OXYGEN SATURATION: 93 % | HEART RATE: 88 BPM | TEMPERATURE: 97.8 F | SYSTOLIC BLOOD PRESSURE: 111 MMHG | DIASTOLIC BLOOD PRESSURE: 50 MMHG | HEIGHT: 64 IN | WEIGHT: 178.35 LBS | RESPIRATION RATE: 18 BRPM

## 2018-09-29 PROBLEM — I50.23 ACUTE ON CHRONIC SYSTOLIC CONGESTIVE HEART FAILURE (HCC): Status: RESOLVED | Noted: 2018-09-28 | Resolved: 2018-09-29

## 2018-09-29 PROBLEM — R04.2 HEMOPTYSIS: Status: RESOLVED | Noted: 2018-09-28 | Resolved: 2018-09-29

## 2018-09-29 PROBLEM — E87.6 HYPOKALEMIA: Status: RESOLVED | Noted: 2018-09-26 | Resolved: 2018-09-29

## 2018-09-29 PROBLEM — I50.43 ACUTE ON CHRONIC COMBINED SYSTOLIC AND DIASTOLIC CONGESTIVE HEART FAILURE (HCC): Status: RESOLVED | Noted: 2018-09-28 | Resolved: 2018-09-29

## 2018-09-29 LAB
ANCA IGG TITR SER IF: NORMAL {TITER}
NUCLEAR IGG SER QL IA: NORMAL

## 2018-09-29 PROCEDURE — 90686 IIV4 VACC NO PRSV 0.5 ML IM: CPT | Performed by: HOSPITALIST

## 2018-09-29 PROCEDURE — A9270 NON-COVERED ITEM OR SERVICE: HCPCS | Performed by: HOSPITALIST

## 2018-09-29 PROCEDURE — 700111 HCHG RX REV CODE 636 W/ 250 OVERRIDE (IP): Performed by: HOSPITALIST

## 2018-09-29 PROCEDURE — 94640 AIRWAY INHALATION TREATMENT: CPT

## 2018-09-29 PROCEDURE — 3E0234Z INTRODUCTION OF SERUM, TOXOID AND VACCINE INTO MUSCLE, PERCUTANEOUS APPROACH: ICD-10-PCS | Performed by: HOSPITALIST

## 2018-09-29 PROCEDURE — 90471 IMMUNIZATION ADMIN: CPT

## 2018-09-29 PROCEDURE — 700101 HCHG RX REV CODE 250: Performed by: HOSPITALIST

## 2018-09-29 PROCEDURE — 700102 HCHG RX REV CODE 250 W/ 637 OVERRIDE(OP): Performed by: HOSPITALIST

## 2018-09-29 PROCEDURE — 99239 HOSP IP/OBS DSCHRG MGMT >30: CPT | Performed by: HOSPITALIST

## 2018-09-29 PROCEDURE — 94760 N-INVAS EAR/PLS OXIMETRY 1: CPT

## 2018-09-29 RX ORDER — FUROSEMIDE 10 MG/ML
40 INJECTION INTRAMUSCULAR; INTRAVENOUS ONCE
Status: COMPLETED | OUTPATIENT
Start: 2018-09-29 | End: 2018-09-29

## 2018-09-29 RX ADMIN — INFLUENZA A VIRUS A/MICHIGAN/45/2015 X-275 (H1N1) ANTIGEN (FORMALDEHYDE INACTIVATED), INFLUENZA A VIRUS A/SINGAPORE/INFIMH-16-0019/2016 IVR-186 (H3N2) ANTIGEN (FORMALDEHYDE INACTIVATED), INFLUENZA B VIRUS B/PHUKET/3073/2013 ANTIGEN (FORMALDEHYDE INACTIVATED), AND INFLUENZA B VIRUS B/MARYLAND/15/2016 BX-69A ANTIGEN (FORMALDEHYDE INACTIVATED) 0.5 ML: 15; 15; 15; 15 INJECTION, SUSPENSION INTRAMUSCULAR at 17:32

## 2018-09-29 RX ADMIN — GABAPENTIN 400 MG: 400 CAPSULE ORAL at 11:36

## 2018-09-29 RX ADMIN — MONTELUKAST SODIUM 10 MG: 10 TABLET, FILM COATED ORAL at 05:25

## 2018-09-29 RX ADMIN — GABAPENTIN 400 MG: 400 CAPSULE ORAL at 05:24

## 2018-09-29 RX ADMIN — FUROSEMIDE 40 MG: 10 INJECTION, SOLUTION INTRAMUSCULAR; INTRAVENOUS at 10:39

## 2018-09-29 RX ADMIN — AZITHROMYCIN MONOHYDRATE 250 MG: 250 TABLET ORAL at 05:24

## 2018-09-29 RX ADMIN — Medication 400 MG: at 05:24

## 2018-09-29 RX ADMIN — METOPROLOL SUCCINATE 25 MG: 25 TABLET, EXTENDED RELEASE ORAL at 05:25

## 2018-09-29 RX ADMIN — FAMOTIDINE 20 MG: 20 TABLET ORAL at 05:24

## 2018-09-29 RX ADMIN — IPRATROPIUM BROMIDE AND ALBUTEROL SULFATE 3 ML: .5; 3 SOLUTION RESPIRATORY (INHALATION) at 07:09

## 2018-09-29 RX ADMIN — LINEZOLID 600 MG: 600 TABLET, FILM COATED ORAL at 05:24

## 2018-09-29 RX ADMIN — IPRATROPIUM BROMIDE AND ALBUTEROL SULFATE 3 ML: .5; 3 SOLUTION RESPIRATORY (INHALATION) at 10:58

## 2018-09-29 RX ADMIN — DULOXETINE HYDROCHLORIDE 60 MG: 30 CAPSULE, DELAYED RELEASE ORAL at 05:24

## 2018-09-29 RX ADMIN — FUROSEMIDE 20 MG: 20 TABLET ORAL at 05:25

## 2018-09-29 ASSESSMENT — PAIN SCALES - GENERAL
PAINLEVEL_OUTOF10: 0
PAINLEVEL_OUTOF10: 0

## 2018-09-29 NOTE — DISCHARGE PLANNING
Received Choice form at 0907  Agency/Facility Name: [facility]  Referral sent per Choice form @ 0907

## 2018-09-29 NOTE — CARE PLAN
Problem: Infection  Goal: Will remain free from infection  Outcome: PROGRESSING AS EXPECTED  Observe standard precaution before and after pt care. Educate patient on how to perfrom proper handwashing and its importance.    Problem: Respiratory:  Goal: Respiratory status will improve  Outcome: PROGRESSING AS EXPECTED  Monitor pt's oxygen saturation as her O2 tends to drop with persistent cough and ambulating. Encourage the use of IS and proper deep breathing. Titrate O2 based on pt's saturation. Monitor pt for any signs and symptoms of SOB/.

## 2018-09-29 NOTE — DISCHARGE PLANNING
Agency/Facility Name: [facility]  Spoke To: Abimael  Outcome:  They are no longer accepting [employer].  Per LSW Mi, referral sent to [facility].

## 2018-09-29 NOTE — DISCHARGE PLANNING
Agency/Facility Name: [facility]  Spoke To: Gabriela  Outcome: requested she review update chart notes, as patient is waiting for discharge.

## 2018-09-29 NOTE — FLOWSHEET NOTE
09/28/18 1816   Events/Summary/Plan   Events/Summary/Plan found on 1.5 LPM NC   Non-Invasive Resp Device Site Inspection Completed Intact   Interdisciplinary Plan of Care-Goals (Indications)   Bronchodilator Indications History / Diagnosis   Interdisciplinary Plan of Care-Outcomes    Bronchodilator Outcome Patient at Stable Baseline   Education   Education Yes - Pt. / Family has been Instructed in use of Respiratory Equipment;Yes - Pt. / Family has been Instructed in use of Respiratory Medications and Adverse Reactions   RT Assessment of Delivered Medications   Evaluation of Medication Delivery Daily Yes-- Pt /Family has been Instructed in use of Respiratory Medications and Adverse Reactions   SVN Group   #SVN Performed Yes   Given By: Mouthpiece   Respiratory WDL   Respiratory (WDL) X   Chest Exam   Work Of Breathing / Effort Mild   Respiration 20   Heart Rate (Monitored) (!) 107   Breath Sounds   Pre/Post Intervention Pre Intervention Assessment   RUL Breath Sounds Diminished;Fine Crackles   RML Breath Sounds Diminished   RLL Breath Sounds Fine Crackles;Diminished   TRISTAN Breath Sounds Diminished;Fine Crackles   LLL Breath Sounds Diminished   Secretions   Cough Dry   Oximetry   #Pulse Oximetry (Single Determination) Yes   Continuous Oximetry Yes   Oxygen   Home O2 Use Prior To Admission? No   Pulse Oximetry 90 %   O2 (LPM) 2  (found on 1.5 LPM)   O2 Daily Delivery Respiratory  Silicone Nasal Cannula

## 2018-09-29 NOTE — DISCHARGE SUMMARY
Discharge Summary    CHIEF COMPLAINT ON ADMISSION  Chief Complaint   Patient presents with   • Shortness of Breath     x3 days.  Seen at  yesterday.  Given ABX for sinus infection and bronchitis.   • Congestion       Reason for Admission  SOB; N/V; sinus infection; bronchi*     Admission Date  9/26/2018    CODE STATUS  Full Code    HPI & HOSPITAL COURSE  53 y.o. female admitted on  9/26/18 with cough for 2 weeks and treated outpatient however worsening symptoms brought her to the ER. CXR shows right lower lobe pna with air bronchograms, she was admitted to the hospital. She also has hx of hemoptysis recently. Pulmonary was consulted. Patient was started on IV abx and cultures were sent. VSS, hgb remained stable,she sas continued on O2. No ct was warranted give her acute infection. Pulmonology recommended outpatient fu and repeat CXR. Echo was obtained and showed EF 55%, no previous to compare with , she had elevated BNP and pleural effusions which suggest acute heart failure, she was treated with PRN lasix with improvement in her breathing.  Patient had a home O2 evaluation done and SW assisted with setting up home O2, patient at this time medical stable with improvement in her s/s for discharge, patient to fu with pulmonary in 2 weeks for repeat imaging and further investigation if no improvement.   Patient understood and agreed with the above plan.       Therefore, she is discharged in good and stable condition to home with close outpatient follow-up.    The patient met 2-midnight criteria for an inpatient stay at the time of discharge.    Discharge Date  9/29/18    FOLLOW UP ITEMS POST DISCHARGE  pcp  pulmonology    DISCHARGE DIAGNOSES  Principal Problem (Resolved):    Sepsis (HCC) POA: Unknown  Active Problems:    Essential hypertension (Chronic) POA: Unknown    CAP (community acquired pneumonia) POA: Unknown    Acute respiratory failure with hypoxia (HCC) POA: Unknown  Resolved Problems:    Hemoptysis POA:  Unknown    Lactic acid acidosis POA: Unknown    Hypokalemia POA: Unknown    Hemoptysis POA: Unknown    Acute on chronic combined systolic and diastolic congestive heart failure (HCC) POA: Unknown    Acute on chronic systolic congestive heart failure (HCC) POA: Unknown      FOLLOW UP  Future Appointments  Date Time Provider Department Center   10/11/2018 3:00 PM Benson Ramirez M.D. [facility] None     No follow-up provider specified.    MEDICATIONS ON DISCHARGE     Medication List      START taking these medications      Instructions   azithromycin 250 MG Tabs  Commonly known as:  ZITHROMAX   Take 1 Tab by mouth every day for 3 days.  Dose:  250 mg     linezolid 600 MG Tabs  Commonly known as:  ZYVOX   Take 1 Tab by mouth every 12 hours.  Dose:  600 mg        CONTINUE taking these medications      Instructions   benzonatate 200 MG capsule  Commonly known as:  TESSALON   Take 1 Cap by mouth 3 times a day as needed.  Dose:  200 mg     Biotin 5000 MCG Caps   Take 1 Cap by mouth every day.  Dose:  1 Cap     cetirizine 10 MG Tabs  Commonly known as:  ZYRTEC   Take 10 mg by mouth every day.  Dose:  10 mg     cevimeline 30 MG capsule  Commonly known as:  EVOXAC   Take 30 mg by mouth 3 times a day.  Dose:  30 mg     Coral Calcium 1000 (390 Ca) MG Tabs   Take 1 Tab by mouth every day.  Dose:  1 Tab     DULoxetine 60 MG Cpep delayed-release capsule  Commonly known as:  CYMBALTA   Take 60 mg by mouth every day.  Dose:  60 mg     FROVA 2.5 MG Tabs  Generic drug:  Frovatriptan Succinate   Take 1 Tab by mouth as needed. Indications: Migraine Headache  Dose:  1 Tab     gabapentin 400 MG Caps  Commonly known as:  NEURONTIN   Take 400 mg by mouth 3 times a day.  Dose:  400 mg     magnesium oxide 400 MG Tabs  Commonly known as:  MAG-OX   Take 400 mg by mouth every day.  Dose:  400 mg     meloxicam 15 MG tablet  Commonly known as:  MOBIC   Take 15 mg by mouth every day.  Dose:  15 mg     montelukast 10 MG Tabs  Commonly known as:   "SINGULAIR   Take 10 mg by mouth every day.  Dose:  10 mg     multivitamin Tabs   Take 1 Tab by mouth every day.  Dose:  1 Tab     PROBIOTIC PO   Take 1 Tab by mouth every day.  Dose:  1 Tab     raNITidine 150 MG Tabs  Commonly known as:  ZANTAC   Take 300 mg by mouth 2 times a day.  Dose:  300 mg     tizanidine 4 MG Tabs  Commonly known as:  ZANAFLEX   Take 4 mg by mouth every 6 hours as needed.  Dose:  4 mg     vitamin B-12 1000 MCG Tabs   Take 1,000 mcg by mouth every 48 hours.  Dose:  1000 mcg     Vitamin D 2000 units Caps   Take 1 Cap by mouth every day.  Dose:  1 Cap     Vitamin K2 100 MCG Caps   Take 1 Tab by mouth every day.  Dose:  1 Tab        STOP taking these medications    doxycycline 100 MG Tabs  Commonly known as:  VIBRAMYCIN     MethylPREDNISolone 4 MG Tbpk  Commonly known as:  MEDROL DOSEPAK     pantoprazole 40 MG Tbec  Commonly known as:  PROTONIX            Allergies  Allergies   Allergen Reactions   • Bee Venom Anaphylaxis   • Contrast Media With Iodine [Iodine] Shortness of Breath   • Econazole Anaphylaxis   • Floxin [Ofloxacin] Anaphylaxis   • Keflex Shortness of Breath   • Levaquin Shortness of Breath   • Morphine Anaphylaxis   • Naloxone Hives     \"hives, SOB\"   • Nitrofurantoin Shortness of Breath     ...and hives     • Oxycodone Shortness of Breath     ...and hives     • Penicillins Shortness of Breath     ...and hives     • Ancef [Cefazolin] Hives   • Bactrim [Sulfamethoxazole W-Trimethoprim] Shortness of Breath   • Bextra [Valdecoxib] Rash     \"Skin burn and peeling.\"   • Other Drug Hives     \"Enconazole nitrate.\" shortness of breath and hives   • Other Misc Unspecified     Adhesive tape: blisters, skin peels off   • Norco [Apap-Fd&C Yellow #10 Al Tai-Hydrocodone] Shortness of Breath     ...and hives     • Tygacil [Tigecycline]      itching       DIET  Orders Placed This Encounter   Procedures   • Diet Order Regular     Standing Status:   Standing     Number of Occurrences:   1     Order " Specific Question:   Diet:     Answer:   Regular [1]       ACTIVITY  As tolerated.  Weight bearing as tolerated    CONSULTATIONS  pulomonology    PROCEDURES  none    LABORATORY  Lab Results   Component Value Date    SODIUM 137 09/27/2018    POTASSIUM 4.1 09/27/2018    CHLORIDE 108 09/27/2018    CO2 23 09/27/2018    GLUCOSE 91 09/27/2018    BUN 30 (H) 09/27/2018    CREATININE 1.11 09/27/2018        Lab Results   Component Value Date    WBC 8.5 09/27/2018    HEMOGLOBIN 11.6 (L) 09/27/2018    HEMATOCRIT 37.1 09/27/2018    PLATELETCT 172 09/27/2018        Total time of the discharge process exceeds 45 minutes.

## 2018-09-29 NOTE — DISCHARGE PLANNING
Agency/Facility Name: [facility]  Spoke To: Gabriela  Outcome: They will need written documentation from MD on DX.  Message left for WAGNER Galarza

## 2018-09-29 NOTE — PROGRESS NOTES
Bedside report received. Assessment complete.  A&O x 4. Patient calls appropriately. Patient up ad ronda.   Denies N&V. Tolerating regular diet.  Patient denies SOB on 1.5-2L nc. Requesting to go home today.  Reviewed plan of care with patient. Call light and personal belongings within reach. Hourly rounding in place. All needs met at this time.

## 2018-09-29 NOTE — PROGRESS NOTES
Patient with new onset systolic HF with reduced EF to 55%, BNP elevation 1359 and clinical s/s support the diagnosis, also has pleural effusions on Chest imaging. Treating with IV lasix and will dc with PO lasix and fu with PCP and cardiology  She is requiring O2 2/2 to above diagnosis  Pneumonia resolved  Hemoptysis resolved  Blood cultures neg

## 2018-09-29 NOTE — DISCHARGE PLANNING
Anticipated Discharge Disposition: Home with O2    Action: LSW spoke with [facility]. O2 approved and will be delivered bedside within the hour. Pt notified.     Barriers to Discharge: None    Plan: Pt to dc home with O2.

## 2018-09-29 NOTE — DISCHARGE PLANNING
Received Choice form at 0838  Agency/Facility Name: [facility]  Referral sent per Choice form @ 0842

## 2018-09-29 NOTE — FLOWSHEET NOTE
09/29/18 1059   Interdisciplinary Plan of Care-Goals (Indications)   Bronchodilator Indications History / Diagnosis   Interdisciplinary Plan of Care-Outcomes    Bronchodilator Outcome Patient at Stable Baseline   Education   Education Yes - Pt. / Family has been Instructed in use of Respiratory Medications and Adverse Reactions   RT Assessment of Delivered Medications   Evaluation of Medication Delivery Daily Yes-- Pt /Family has been Instructed in use of Respiratory Medications and Adverse Reactions   SVN Group   #SVN Performed Yes   Given By: Mouthpiece   Respiratory WDL   Respiratory (WDL) X   Chest Exam   Work Of Breathing / Effort Mild   Respiration 18   Pulse 88   Breath Sounds   Pre/Post Intervention Pre Intervention Assessment   RUL Breath Sounds Crackles   RML Breath Sounds Diminished   RLL Breath Sounds Diminished   TRISTAN Breath Sounds Crackles   LLL Breath Sounds Diminished   Secretions   Cough Moist;Non Productive   Oximetry   #Pulse Oximetry (Single Determination) Yes   Oxygen   Pulse Oximetry 93 %   O2 (LPM) 2   O2 Daily Delivery Respiratory  Silicone Nasal Cannula

## 2018-09-29 NOTE — DISCHARGE INSTRUCTIONS
Discharge Instructions    Discharged to home by car with friend. Discharged via wheelchair, hospital escort: Yes.  Special equipment needed: Oxygen    Be sure to schedule a follow-up appointment with your primary care doctor or any specialists as instructed.     Discharge Plan:   Diet Plan: Discussed  Activity Level: Discussed  Confirmed Follow up Appointment: Patient to Call and Schedule Appointment  Confirmed Symptoms Management: Discussed  Medication Reconciliation Updated: Yes  Pneumococcal Vaccine Administered/Refused: Not given - Patient refused pneumococcal vaccine  Influenza Vaccine Indication: Indicated: 9 to 64 years of age    I understand that a diet low in cholesterol, fat, and sodium is recommended for good health. Unless I have been given specific instructions below for another diet, I accept this instruction as my diet prescription.   Other diet: regular    Special Instructions: None    · Is patient discharged on Warfarin / Coumadin?   No       Heart Failure  Heart failure is a condition in which the heart has trouble pumping blood because it has become weak or stiff. This means that the heart does not pump blood efficiently for the body to work well. For some people with heart failure, fluid may back up into the lungs and there may be swelling (edema) in the lower legs. Heart failure is usually a long-term (chronic) condition. It is important for you to take good care of yourself and follow the treatment plan from your health care provider.  What are the causes?  This condition is caused by some health problems, including:  · High blood pressure (hypertension). Hypertension causes the heart muscle to work harder than normal. High blood pressure eventually causes the heart to become stiff and weak.  · Coronary artery disease (CAD). CAD is the buildup of cholesterol and fat (plaques) in the arteries of the heart.  · Heart attack (myocardial infarction). Injured tissue, which is caused by the heart  attack, does not contract as well and the heart's ability to pump blood is weakened.  · Abnormal heart valves. When the heart valves do not open and close properly, the heart muscle must pump harder to keep the blood flowing.  · Heart muscle disease (cardiomyopathy or myocarditis). Heart muscle disease is damage to the heart muscle from a variety of causes, such as drug or alcohol abuse, infections, or unknown causes. These can increase the risk of heart failure.  · Lung disease. When the lungs do not work properly, the heart must work harder.  What increases the risk?  Risk of heart failure increases as a person ages. This condition is also more likely to develop in people who:  · Are overweight.  · Are male.  · Smoke or chew tobacco.  · Abuse alcohol or illegal drugs.  · Have taken medicines that can damage the heart, such as chemotherapy drugs.  · Have diabetes.  ¨ High blood sugar (glucose) is associated with high fat (lipid) levels in the blood.  ¨ Diabetes can also damage tiny blood vessels that carry nutrients to the heart muscle.  · Have abnormal heart rhythms.  · Have thyroid problems.  · Have low blood counts (anemia).  What are the signs or symptoms?  Symptoms of this condition include:  · Shortness of breath with activity, such as when climbing stairs.  · Persistent cough.  · Swelling of the feet, ankles, legs, or abdomen.  · Unexplained weight gain.  · Difficulty breathing when lying flat (orthopnea).  · Waking from sleep because of the need to sit up and get more air.  · Rapid heartbeat.  · Fatigue and loss of energy.  · Feeling light-headed, dizzy, or close to fainting.  · Loss of appetite.  · Nausea.  · Increased urination during the night (nocturia).  · Confusion.  How is this diagnosed?  This condition is diagnosed based on:  · Medical history, symptoms, and a physical exam.  · Diagnostic tests, which may include:  ¨ Echocardiogram.  ¨ Electrocardiogram (ECG).  ¨ Chest X-ray.  ¨ Blood  tests.  ¨ Exercise stress test.  ¨ Radionuclide scans.  ¨ Cardiac catheterization and angiogram.  How is this treated?  Treatment for this condition is aimed at managing the symptoms of heart failure. Medicines, behavioral changes, or other treatments may be necessary to treat heart failure.  Medicines   These may include:  · Angiotensin-converting enzyme (ACE) inhibitors. This type of medicine blocks the effects of a blood protein called angiotensin-converting enzyme. ACE inhibitors relax (dilate) the blood vessels and help to lower blood pressure.  · Angiotensin receptor blockers (ARBs). This type of medicine blocks the actions of a blood protein called angiotensin. ARBs dilate the blood vessels and help to lower blood pressure.  · Water pills (diuretics). Diuretics cause the kidneys to remove salt and water from the blood. The extra fluid is removed through urination, leaving a lower volume of blood that the heart has to pump.  · Beta blockers. These improve heart muscle strength and they prevent the heart from beating too quickly.  · Digoxin. This increases the force of the heartbeat.  Healthy behavior changes   These may include:  · Reaching and maintaining a healthy weight.  · Stopping smoking or chewing tobacco.  · Eating heart-healthy foods.  · Limiting or avoiding alcohol.  · Stopping use of street drugs (illegal drugs).  · Physical activity.  Other treatments   These may include:  · Surgery to open blocked coronary arteries or repair damaged heart valves.  · Placement of a biventricular pacemaker to improve heart muscle function (cardiac resynchronization therapy). This device paces both the right ventricle and left ventricle.  · Placement of a device to treat serious abnormal heart rhythms (implantable cardioverter defibrillator, or ICD).  · Placement of a device to improve the pumping ability of the heart (left ventricular assist device, or LVAD).  · Heart transplant. This can cure heart failure, and it  is considered for certain patients who do not improve with other therapies.  Follow these instructions at home:  Medicines  · Take over-the-counter and prescription medicines only as told by your health care provider. Medicines are important in reducing the workload of your heart, slowing the progression of heart failure, and improving your symptoms.  ¨ Do not stop taking your medicine unless your health care provider told you to do that.  ¨ Do not skip any dose of medicine.  ¨ Refill your prescriptions before you run out of medicine. You need your medicines every day.  Eating and drinking  · Eat heart-healthy foods. Talk with a dietitian to make an eating plan that is right for you.  ¨ Choose foods that contain no trans fat and are low in saturated fat and cholesterol. Healthy choices include fresh or frozen fruits and vegetables, fish, lean meats, legumes, fat-free or low-fat dairy products, and whole-grain or high-fiber foods.  ¨ Limit salt (sodium) if directed by your health care provider. Sodium restriction may reduce symptoms of heart failure. Ask a dietitian to recommend heart-healthy seasonings.  ¨ Use healthy cooking methods instead of frying. Healthy methods include roasting, grilling, broiling, baking, poaching, steaming, and stir-frying.  · Limit your fluid intake if directed by your health care provider. Fluid restriction may reduce symptoms of heart failure.  Lifestyle  · Stop smoking or using chewing tobacco. Nicotine and tobacco can damage your heart and your blood vessels. Do not use nicotine gum or patches before talking to your health care provider.  · Limit alcohol intake to no more than 1 drink per day for non-pregnant women and 2 drinks per day for men. One drink equals 12 oz of beer, 5 oz of wine, or 1½ oz of hard liquor.  ¨ Drinking more than that is harmful to your heart. Tell your health care provider if you drink alcohol several times a week.  ¨ Talk with your health care provider about  whether any level of alcohol use is safe for you.  ¨ If your heart has already been damaged by alcohol or you have severe heart failure, drinking alcohol should be stopped completely.  · Stop use of illegal drugs.  · Lose weight if directed by your health care provider. Weight loss may reduce symptoms of heart failure.  · Do moderate physical activity if directed by your health care provider. People who are elderly and people with severe heart failure should consult with a health care provider for physical activity recommendations.  Monitor important information  · Weigh yourself every day. Keeping track of your weight daily helps you to notice excess fluid sooner.  ¨ Weigh yourself every morning after you urinate and before you eat breakfast.  ¨ Wear the same amount of clothing each time you weigh yourself.  ¨ Record your daily weight. Provide your health care provider with your weight record.  · Monitor and record your blood pressure as told by your health care provider.  · Check your pulse as told by your health care provider.  Dealing with extreme temperatures  · If the weather is extremely hot:  ¨ Avoid vigorous physical activity.  ¨ Use air conditioning or fans or seek a cooler location.  ¨ Avoid caffeine and alcohol.  ¨ Wear loose-fitting, lightweight, and light-colored clothing.  · If the weather is extremely cold:  ¨ Avoid vigorous physical activity.  ¨ Layer your clothes.  ¨ Wear mittens or gloves, a hat, and a scarf when you go outside.  ¨ Avoid alcohol.  General instructions  · Manage other health conditions such as hypertension, diabetes, thyroid disease, or abnormal heart rhythms as told by your health care provider.  · Learn to manage stress. If you need help to do this, ask your health care provider.  · Plan rest periods when fatigued.  · Get ongoing education and support as needed.  · Participate in or seek rehabilitation as needed to maintain or improve independence and quality of life.  · Stay up  to date with immunizations. Keeping current on pneumococcal and influenza immunizations is especially important to prevent respiratory infections.  · Keep all follow-up visits as told by your health care provider. This is important.  Contact a health care provider if:  · You have a rapid weight gain.  · You have increasing shortness of breath that is unusual for you.  · You are unable to participate in your usual physical activities.  · You tire easily.  · You cough more than normal, especially with physical activity.  · You have any swelling or more swelling in areas such as your hands, feet, ankles, or abdomen.  · You are unable to sleep because it is hard to breathe.  · You feel like your heart is beating quickly (palpitations).  · You become dizzy or light-headed when you stand up.  Get help right away if:  · You have difficulty breathing.  · You notice or your family notices a change in your awareness, such as having trouble staying awake or having difficulty with concentration.  · You have pain or discomfort in your chest.  · You have an episode of fainting (syncope).  This information is not intended to replace advice given to you by your health care provider. Make sure you discuss any questions you have with your health care provider.  Document Released: 12/18/2006 Document Revised: 08/22/2017 Document Reviewed: 07/12/2017  Elsevier Interactive Patient Education © 2017 [employer]      Depression / Suicide Risk    As you are discharged from this [facility] facility, it is important to learn how to keep safe from harming yourself.    Recognize the warning signs:  · Abrupt changes in personality, positive or negative- including increase in energy   · Giving away possessions  · Change in eating patterns- significant weight changes-  positive or negative  · Change in sleeping patterns- unable to sleep or sleeping all the time   · Unwillingness or inability to communicate  · Depression  · Unusual sadness,  discouragement and loneliness  · Talk of wanting to die  · Neglect of personal appearance   · Rebelliousness- reckless behavior  · Withdrawal from people/activities they love  · Confusion- inability to concentrate     If you or a loved one observes any of these behaviors or has concerns about self-harm, here's what you can do:  · Talk about it- your feelings and reasons for harming yourself  · Remove any means that you might use to hurt yourself (examples: pills, rope, extension cords, firearm)  · Get professional help from the community (Mental Health, Substance Abuse, psychological counseling)  · Do not be alone:Call your Safe Contact- someone whom you trust who will be there for you.  · Call your local CRISIS HOTLINE 486-7281 or 455-472-0141  · Call your local Children's Mobile Crisis Response Team [place] (074) 524-4283 or www.[employer]  · Call the toll free National Suicide Prevention Hotlines   · National Suicide Prevention Lifeline 708-234-QZCV (1072)  · National Hope Line Network 800-SUICIDE (005-6876)

## 2018-09-29 NOTE — PROGRESS NOTES
185 Received report from AILYN Mejia, POC discussed, safety measure kept in place.    191 Pt was sitting upright in bed, alert and oriented x4. Denies SOB/. O2 saturation at 92% on 2L. No nausea reported. POC discussed such as medications. Encouraged to continue the use of IS and proper deep breathing. Met pt's needs at this time. All safety measures kept in place.

## 2018-09-29 NOTE — FLOWSHEET NOTE
09/29/18 0706   Interdisciplinary Plan of Care-Goals (Indications)   Bronchodilator Indications History / Diagnosis   Interdisciplinary Plan of Care-Outcomes    Bronchodilator Outcome Patient at Stable Baseline   Education   Education Yes - Pt. / Family has been Instructed in use of Respiratory Medications and Adverse Reactions   RT Assessment of Delivered Medications   Evaluation of Medication Delivery Daily Yes-- Pt /Family has been Instructed in use of Respiratory Medications and Adverse Reactions   SVN Group   #SVN Performed Yes   Given By: Mouthpiece   Incentive Spirometry Group   Incentive Spirometry Instruction Yes   Breathing Exercises Yes   Incentive Spirometer Volume 1500 mL   Respiratory WDL   Respiratory (WDL) X   Chest Exam   Work Of Breathing / Effort Mild   Respiration 17   Pulse 91   Breath Sounds   Pre/Post Intervention Pre Intervention Assessment   RUL Breath Sounds Fine Crackles   RML Breath Sounds Diminished   RLL Breath Sounds Fine Crackles   TRISTAN Breath Sounds Diminished   LLL Breath Sounds Diminished   Secretions   Cough Moist;Productive   Oximetry   #Pulse Oximetry (Single Determination) Yes   Oxygen   Pulse Oximetry 95 %   O2 (LPM) 2   O2 Daily Delivery Respiratory  Silicone Nasal Cannula

## 2018-10-01 LAB
BACTERIA BLD CULT: NORMAL
BACTERIA BLD CULT: NORMAL
SIGNIFICANT IND 70042: NORMAL
SIGNIFICANT IND 70042: NORMAL
SITE SITE: NORMAL
SITE SITE: NORMAL
SOURCE SOURCE: NORMAL
SOURCE SOURCE: NORMAL

## 2018-10-03 LAB
MISCELLANEOUS LAB RESULT MISCLAB: NORMAL
MISCELLANEOUS LAB RESULT MISCLAB: NORMAL

## 2018-10-11 ENCOUNTER — OFFICE VISIT (OUTPATIENT)
Dept: INTERNAL MEDICINE | Facility: MEDICAL CENTER | Age: 53
End: 2018-10-11
Payer: MEDICARE

## 2018-10-11 VITALS
HEIGHT: 64 IN | SYSTOLIC BLOOD PRESSURE: 174 MMHG | BODY MASS INDEX: 30.9 KG/M2 | DIASTOLIC BLOOD PRESSURE: 110 MMHG | WEIGHT: 181 LBS | OXYGEN SATURATION: 94 % | TEMPERATURE: 98.2 F | HEART RATE: 109 BPM

## 2018-10-11 DIAGNOSIS — G43.709 CHRONIC MIGRAINE WITHOUT AURA WITHOUT STATUS MIGRAINOSUS, NOT INTRACTABLE: ICD-10-CM

## 2018-10-11 DIAGNOSIS — I10 ESSENTIAL HYPERTENSION: Chronic | ICD-10-CM

## 2018-10-11 DIAGNOSIS — M62.838 MUSCLE SPASM: ICD-10-CM

## 2018-10-11 DIAGNOSIS — K21.9 GASTROESOPHAGEAL REFLUX DISEASE WITHOUT ESOPHAGITIS: ICD-10-CM

## 2018-10-11 DIAGNOSIS — M79.7 FIBROMYALGIA: ICD-10-CM

## 2018-10-11 DIAGNOSIS — I77.810 AORTIC ROOT DILATATION (HCC): ICD-10-CM

## 2018-10-11 DIAGNOSIS — Z88.9 MULTIPLE ALLERGIES: ICD-10-CM

## 2018-10-11 DIAGNOSIS — R68.2 DRY MOUTH: ICD-10-CM

## 2018-10-11 DIAGNOSIS — I51.7 RIGHT HEART ENLARGEMENT: ICD-10-CM

## 2018-10-11 DIAGNOSIS — Z09 HOSPITAL DISCHARGE FOLLOW-UP: ICD-10-CM

## 2018-10-11 PROBLEM — I50.9 CHF (CONGESTIVE HEART FAILURE) (HCC): Status: ACTIVE | Noted: 2018-10-11

## 2018-10-11 PROBLEM — J96.01 ACUTE RESPIRATORY FAILURE WITH HYPOXIA (HCC): Status: RESOLVED | Noted: 2018-09-26 | Resolved: 2018-10-11

## 2018-10-11 PROCEDURE — 99204 OFFICE O/P NEW MOD 45 MIN: CPT | Mod: GC | Performed by: INTERNAL MEDICINE

## 2018-10-11 RX ORDER — PANTOPRAZOLE SODIUM 40 MG/1
TABLET, DELAYED RELEASE ORAL
Refills: 2 | Status: ON HOLD | COMMUNITY
Start: 2018-10-06 | End: 2019-11-05

## 2018-10-11 RX ORDER — EPINEPHRINE 0.3 MG/.3ML
0.3 INJECTION SUBCUTANEOUS ONCE
Qty: 0.6 ML | Refills: 2 | Status: SHIPPED | OUTPATIENT
Start: 2018-10-11 | End: 2018-10-11

## 2018-10-11 RX ORDER — RANITIDINE 150 MG/1
300 TABLET ORAL 2 TIMES DAILY
Qty: 60 TAB | Refills: 3 | Status: SHIPPED | OUTPATIENT
Start: 2018-10-11 | End: 2019-01-09 | Stop reason: SDUPTHER

## 2018-10-11 RX ORDER — MONTELUKAST SODIUM 10 MG/1
10 TABLET ORAL DAILY
Qty: 90 TAB | Refills: 0 | Status: SHIPPED | OUTPATIENT
Start: 2018-10-11 | End: 2019-01-09 | Stop reason: SDUPTHER

## 2018-10-11 RX ORDER — DULOXETIN HYDROCHLORIDE 60 MG/1
60 CAPSULE, DELAYED RELEASE ORAL DAILY
Qty: 90 CAP | Refills: 3 | Status: ON HOLD | OUTPATIENT
Start: 2018-10-11 | End: 2019-11-05

## 2018-10-11 RX ORDER — FROVATRIPTAN SUCCINATE 2.5 MG/1
1 TABLET, FILM COATED ORAL PRN
Qty: 10 TAB | Refills: 3 | Status: SHIPPED | OUTPATIENT
Start: 2018-10-11 | End: 2019-04-26 | Stop reason: SDUPTHER

## 2018-10-11 RX ORDER — MONTELUKAST SODIUM 4 MG/1
TABLET, CHEWABLE ORAL
Refills: 1 | Status: ON HOLD | COMMUNITY
Start: 2018-10-06 | End: 2019-02-07

## 2018-10-11 RX ORDER — TIZANIDINE 4 MG/1
4 TABLET ORAL EVERY 6 HOURS PRN
Qty: 30 TAB | Refills: 3 | Status: SHIPPED | OUTPATIENT
Start: 2018-10-11 | End: 2018-11-04

## 2018-10-11 RX ORDER — GABAPENTIN 400 MG/1
400 CAPSULE ORAL 3 TIMES DAILY
Qty: 180 CAP | Refills: 3 | Status: ON HOLD | OUTPATIENT
Start: 2018-10-11 | End: 2021-07-09

## 2018-10-11 RX ORDER — CEVIMELINE HYDROCHLORIDE 30 MG/1
30 CAPSULE ORAL 3 TIMES DAILY
Qty: 90 CAP | Refills: 3 | Status: SHIPPED | OUTPATIENT
Start: 2018-10-11 | End: 2019-03-07 | Stop reason: SDUPTHER

## 2018-10-11 RX ORDER — MELOXICAM 15 MG/1
15 TABLET ORAL DAILY
Qty: 30 TAB | Refills: 1 | Status: SHIPPED | OUTPATIENT
Start: 2018-10-11 | End: 2019-01-09 | Stop reason: SDUPTHER

## 2018-10-11 RX ORDER — LISINOPRIL 5 MG/1
5 TABLET ORAL DAILY
Qty: 90 TAB | Refills: 2 | Status: SHIPPED | OUTPATIENT
Start: 2018-10-11 | End: 2018-12-19

## 2018-10-11 ASSESSMENT — PAIN SCALES - GENERAL: PAINLEVEL: 3=SLIGHT PAIN

## 2018-10-11 NOTE — PROGRESS NOTES
New Patient to Establish    Reason to establish: Hospital follow-up    CC:   Chief Complaint   Patient presents with   • New Patient   • Follow-Up         HPI:   This is 52 yo female with hx of CHF(rercently diagnosed), fibromyalgia, recent admission for community acquired pneumonia, here for hosp diachrge follow up.  In brief: she was  admitted on  9/26/18 with cough for 2 weeks and treated outpatient however worsening symptoms brought her to the ER. CXR shows right lower lobe pna with air bronchograms, she was admitted.  There was associated hemoptysis but resolved now. Echo was obtained and showed EF 55%, no previous to compare with , she had elevated BNP and pleural effusions which suggest acute heart failure, she was treated with PRN lasix with improvement in her breathing.  Patient had a home O2 evaluation done and was set up for  home O2,     Since discharge she continuous to need less oxygen. She only used oxygen last night when she noticed sob, on lying flat. Today in clinic she denies sob  chest pain, wheezing, PND.  Has hx of HTN but currently not on medications. She states her blood pressure goes up and sometime lowers to am point she feels dizzy. She has been evaluated in the past but no findings. She has appointment with cardiology on 10/19. Echo in hosp showed elevated RHSP of 50mmhg     Her other comorbidietss include GERD, multiple allergies, and to bees, and needs epipen.     ROS:  Constitutional: No fever, no chills,  Respiratory: + cough, no hemoptysis,  Cardiovascular: No chest pain, no palpitations, no PND, no lower extremity swelling  GI  : No nausea, no vomiting, no abdominal pain, no diarrhea, no constipation, no hematochezia  : No dysuria, no urgency, no hesitancy, no nocturia, no frequency, no hematuria  Endocrine: No polyuria, no polydipsia,  Hematology: No easy bruisability, no bleeding  Musculoskeletal: No joint swelling, no joint redness,  Neuro: No seizures, no numbness, no tingling  sensation, no extremity weakness, no change in vision  Psychiatry: no depression, no suicidal ideation      Patient Active Problem List    Diagnosis Date Noted   • Hospital discharge follow-up 10/11/2018   • Gastroesophageal reflux disease without esophagitis 10/11/2018   • Dry mouth in setting of trauma 2001 10/11/2018   • Muscle spasm 10/11/2018   • Fibromyalgia 10/11/2018   • Multiple allergies 10/11/2018   • Chronic migraine without aura, not intractable 10/11/2018   • CHF (congestive heart failure) ([facility]) 10/11/2018   • Right heart enlargement 10/11/2018   • Aortic root dilatation ([facility]) 10/11/2018   • Essential hypertension 09/26/2018   • CAP (community acquired pneumonia) 09/26/2018       Past Medical History:   Diagnosis Date   • Asthma    • Chronic pain    • Fibromyalgia    • GERD (gastroesophageal reflux disease)    • Migraine    • Osteoporosis    • Urticaria        Current Outpatient Prescriptions   Medication Sig Dispense Refill   • lisinopril (PRINIVIL) 5 MG Tab Take 1 Tab by mouth every day. 90 Tab 2   • EPINEPHrine (EPIPEN 2-JOSSE) 0.3 MG/0.3ML Solution Auto-injector solution for injection 0.3 mL by Intramuscular route Once for 1 dose. 0.6 mL 2   • raNITidine (ZANTAC) 150 MG Tab Take 2 Tabs by mouth 2 times a day. 60 Tab 3   • montelukast (SINGULAIR) 10 MG Tab Take 1 Tab by mouth every day. 90 Tab 0   • meloxicam (MOBIC) 15 MG tablet Take 1 Tab by mouth every day. 30 Tab 1   • gabapentin (NEURONTIN) 400 MG Cap Take 1 Cap by mouth 3 times a day. 180 Cap 3   • Frovatriptan Succinate (FROVA) 2.5 MG Tab Take 1 Tab by mouth as needed. 10 Tab 3   • DULoxetine (CYMBALTA) 60 MG Cap DR Particles delayed-release capsule Take 1 Cap by mouth every day. 90 Cap 3   • cevimeline (EVOXAC) 30 MG capsule Take 1 Cap by mouth 3 times a day. 90 Cap 3   • tizanidine (ZANAFLEX) 4 MG Tab Take 1 Tab by mouth every 6 hours as needed. 30 Tab 3   • linezolid (ZYVOX) 600 MG Tab Take 1 Tab by mouth every 12 hours. 20 Tab 0   • Coral  Calcium 1000 (390 Ca) MG Tab Take 1 Tab by mouth every day.     • cetirizine (ZYRTEC) 10 MG Tab Take 10 mg by mouth every day.     • magnesium oxide (MAG-OX) 400 MG Tab Take 400 mg by mouth every day.     • Cyanocobalamin (VITAMIN B-12) 1000 MCG Tab Take 1,000 mcg by mouth every 48 hours.     • Biotin 5000 MCG Cap Take 1 Cap by mouth every day.     • Cholecalciferol (VITAMIN D) 2000 units Cap Take 1 Cap by mouth every day.     • Probiotic Product (PROBIOTIC PO) Take 1 Tab by mouth every day.     • Menaquinone-7 (VITAMIN K2) 100 MCG Cap Take 1 Tab by mouth every day.     • multivitamin (THERAGRAN) Tab Take 1 Tab by mouth every day.     • pantoprazole (PROTONIX) 40 MG Tablet Delayed Response TK 1 T PO BID  2   • colestipol (COLESTID) 1 GM Tab TK 2 TS PO BID  1   • benzonatate (TESSALON) 200 MG capsule Take 1 Cap by mouth 3 times a day as needed. 30 Cap 0     No current facility-administered medications for this visit.        Allergies as of 10/11/2018 - Reviewed 10/11/2018   Allergen Reaction Noted   • Bee venom Anaphylaxis 01/02/2017   • Contrast media with iodine [iodine] Shortness of Breath 01/02/2017   • Econazole Anaphylaxis 01/02/2017   • Floxin [ofloxacin] Anaphylaxis 01/02/2017   • Keflex Shortness of Breath 01/02/2017   • Levaquin Shortness of Breath 01/02/2017   • Morphine Anaphylaxis 01/02/2017   • Naloxone Hives 09/07/2018   • Nitrofurantoin Shortness of Breath 01/02/2017   • Oxycodone Shortness of Breath 01/02/2017   • Penicillins Shortness of Breath 01/02/2017   • Ancef [cefazolin] Hives 01/02/2017   • Bactrim [sulfamethoxazole w-trimethoprim] Shortness of Breath 01/02/2017   • Bextra [valdecoxib] Rash 01/02/2017   • Other drug Hives 01/02/2017   • Other misc Unspecified 01/02/2017   • Norco [apap-fd&c yellow #10 al damon-hydrocodone] Shortness of Breath 01/02/2017   • Tygacil [tigecycline]  09/26/2018       Social History     Social History   • Marital status:      Spouse name: N/A   • Number of  "children: N/A   • Years of education: N/A     Occupational History   • Not on file.     Social History Main Topics   • Smoking status: Never Smoker   • Smokeless tobacco: Never Used   • Alcohol use Yes      Comment: occasional   • Drug use: No   • Sexual activity: Not on file     Other Topics Concern   • Not on file     Social History Narrative   • No narrative on file       Family History   Problem Relation Age of Onset   • Heart Disease Mother    • Diabetes Mother    • Heart Disease Brother        Past Surgical History:   Procedure Laterality Date   • APPENDECTOMY     • OTHER ABDOMINAL SURGERY       BP (!) 174/110 Comment: repeat  Pulse (!) 109   Temp 36.8 °C (98.2 °F)   Ht 1.626 m (5' 4\")   Wt 82.1 kg (181 lb)   SpO2 94%   BMI 31.07 kg/m²     Physical Exam  General:female,   not in distress,   HEENT: Normocephalic, atraumatic, no jaundice or scleral icterus, no pallor, No cervical lymphadenopathy, no thyromegaly,  No tonsillar exudate, no throat erythyema,  PERLL, EOMI,   Respiratory:lungs clear to auscultation b/l, breath sounds vesicular, air entry adequate b/l,no wheezing, rales or crackles  Cardiac:Regular, rate and rhythm, S1/S2 present, no M/R/G  GI: abdomen non distended, soft, non tender, no organomegaly, bowel sounds normoactive  Neuro: AAOX4,   Psychiatry: Good judgment,  Msk/Extremities: radial, dorsalis pedis pulses 2+b/l, no joint swelling or redness, ROM intact, no lower  extremity edema  Skin: No rash    Note: I have reviewed all pertinent labs and diagnostic tests associated with this visit with specific comments listed under the assessment and plan below    Assessment and Plan    1. Hospital discharge follow-up for community acquired pneumonia  Admitted for CAP, treated on antibiotics. WAS discharged on linozolid, has last dose today  Exam of lungs appears clear.   Will hold of cxr now, as it will not change any management now    2. Essential hypertension  3. Right heart " enlargement  4.CHF  Was noted to have pleural effusion and likely from CHF, which improved on lasix.  Echo showed elevated Right sided heart pressures, ef of 55%  Blood pressure elevated to 170s today  Pt has hx of high and low Bps with symptoms while low BP  Has been evaluated with no cause.   Has appointment with cardiology on 10/19  Will start on lisinopril 5mg daily  Patient asked to measure Bp at home and should not take if Bp <120 systolic    4. Aortic root dilatation (HCC)  Noted on cardiac echo shows 3.6 cm.  Will follow up 6-12 months     5. Gastroesophageal reflux disease without esophagitis  Stable and pt on prilosec    6. Dry mouth in setting of trauma 2001  Pt on cevimeline and will continue     7. Muscle spasm  8. Fibromyalgia  Pt has been on cymbalta, and gabapentin and states it helps  Will continue zanaflex for muscle spasm    9. Multiple allergies  Also anaphylaxis to bee sting  zantec helps with skinitching and rash  Will refil epipien     10. Chronic migraine without aura without status migrainosus, not intractable  Stable   Continue frovatiptan (9 pilsl given)      Followup: Return in about 4 weeks (around 11/8/2018).    Risk Assessment (discuss potential complications a function of chronic problems): yes     Complexity (discuss number of co-morbidities): 8    Signed by: Benson Ramirez M.D.

## 2018-10-13 LAB
BUN SERPL-MCNC: 11 MG/DL (ref 6–24)
BUN/CREAT SERPL: 11 (ref 9–23)
CALCIUM SERPL-MCNC: 9.3 MG/DL (ref 8.7–10.2)
CHLORIDE SERPL-SCNC: 105 MMOL/L (ref 96–106)
CO2 SERPL-SCNC: 19 MMOL/L (ref 20–29)
CREAT SERPL-MCNC: 1.01 MG/DL (ref 0.57–1)
GLUCOSE SERPL-MCNC: 80 MG/DL (ref 65–99)
IF AFRICAN AMERICAN  100797: 73 ML/MIN/1.73
IF NON AFRICAN AMER 100791: 63 ML/MIN/1.73
POTASSIUM SERPL-SCNC: 3.9 MMOL/L (ref 3.5–5.2)
SODIUM SERPL-SCNC: 142 MMOL/L (ref 134–144)

## 2018-10-14 LAB — EKG IMPRESSION: NORMAL

## 2018-10-15 LAB — LEPTIN SERPL-MCNC: 17.2 NG/ML (ref 0.5–15.2)

## 2018-10-19 ENCOUNTER — OFFICE VISIT (OUTPATIENT)
Dept: CARDIOLOGY | Facility: MEDICAL CENTER | Age: 53
End: 2018-10-19
Payer: MEDICARE

## 2018-10-19 VITALS
HEART RATE: 92 BPM | DIASTOLIC BLOOD PRESSURE: 101 MMHG | HEIGHT: 64 IN | WEIGHT: 174 LBS | BODY MASS INDEX: 29.71 KG/M2 | OXYGEN SATURATION: 98 % | SYSTOLIC BLOOD PRESSURE: 147 MMHG

## 2018-10-19 DIAGNOSIS — I71.21 ASCENDING AORTIC ANEURYSM (HCC): ICD-10-CM

## 2018-10-19 DIAGNOSIS — I50.32 CHRONIC DIASTOLIC CONGESTIVE HEART FAILURE (HCC): ICD-10-CM

## 2018-10-19 DIAGNOSIS — R09.89 LABILE HYPERTENSION: ICD-10-CM

## 2018-10-19 PROCEDURE — 99204 OFFICE O/P NEW MOD 45 MIN: CPT | Performed by: INTERNAL MEDICINE

## 2018-10-19 RX ORDER — TOPIRAMATE 100 MG/1
200 TABLET, FILM COATED ORAL 2 TIMES DAILY
COMMUNITY
End: 2018-11-04

## 2018-10-19 RX ORDER — EPINEPHRINE 0.3 MG/.3ML
0.5 INJECTION SUBCUTANEOUS ONCE
Status: ON HOLD | COMMUNITY
End: 2019-02-07

## 2018-10-19 RX ORDER — GABAPENTIN 300 MG/1
300 CAPSULE ORAL 3 TIMES DAILY
COMMUNITY
End: 2018-11-04

## 2018-10-19 RX ORDER — POTASSIUM CHLORIDE 20 MEQ/1
20 TABLET, EXTENDED RELEASE ORAL DAILY
Qty: 30 TAB | Refills: 5 | Status: SHIPPED | OUTPATIENT
Start: 2018-10-19 | End: 2018-12-03 | Stop reason: SDUPTHER

## 2018-10-19 RX ORDER — FUROSEMIDE 20 MG/1
20 TABLET ORAL DAILY
Qty: 30 TAB | Refills: 3 | Status: ON HOLD | OUTPATIENT
Start: 2018-10-19 | End: 2019-02-19

## 2018-10-19 ASSESSMENT — ENCOUNTER SYMPTOMS
SHORTNESS OF BREATH: 1
NECK PAIN: 1
ORTHOPNEA: 1
DIARRHEA: 1
NERVOUS/ANXIOUS: 0
INSOMNIA: 0
CHILLS: 0
WEIGHT LOSS: 0
FEVER: 0
HEADACHES: 1
COUGH: 1
ABDOMINAL PAIN: 1
SORE THROAT: 1
HEARTBURN: 1
BACK PAIN: 1
VOMITING: 1
PALPITATIONS: 0
DIZZINESS: 0
NAUSEA: 1
DOUBLE VISION: 0
FOCAL WEAKNESS: 0
DEPRESSION: 0

## 2018-10-19 NOTE — LETTER
"     [facility]   [address], [facility] [address]  [place] [address]  Phone: [phone]  Fax: [phone]              Donna Isaac  1965    Encounter Date: 10/19/2018    Adriaan Carlson M.D.          PROGRESS NOTE:  Chief Complaint   Patient presents with   • Heart failure and ascending aortic aneurysm     new patient       Subjective:   Donna Isaac is a 53 y.o. female who presents today for evaluation of above issues.    She is seen in consultation at the request of Dr. Ramirez.  She moved here from [place] 6 weeks ago or so.  She reported history of stress induced cardiomyopathy in August of 2015 diagnosed by cardiac catheterization in [place]. She was told that her cardiac function did return to normal.  She had no cardiac symptoms since.    She was admitted a few weeks ago with shortness of breath and hemoptysis. Denies subjective fever or chills. Troponin was mildly elevated. BNP was markedly elevated at 1359.  She was given antibiotics as well IV furosemide.   Echocardiograhy reported showed normal EF but the LV apex was not well seen.   CT chest reportedly showed mild ascending aortic aneurysm at 4 cm, along with \"Mild CHF with small bilateral pleural effusions\"  She improved but was only sent home on antibiotics and supplement O2.  She sleeps with her head elevated but stated for her GERD. She still uses O2 periodically.  She does not take her AM medication until 9 or as late as 11 AM even though she usually wakes up around 6 AM.    She was recently told by her GI about aneurysm.  She is quite concerned about it which she was not informed when she was discharged.    She stated that her BP has been rather labile, high in the morning but low at night. She has been referred to endocrinologist and been started on low dose enalapril and still undergoing testings at present.    Past Medical History:   Diagnosis Date   • Asthma    • Chronic pain    • " "Fibromyalgia    • GERD (gastroesophageal reflux disease)    • Migraine    • Osteoporosis    • Urticaria      Past Surgical History:   Procedure Laterality Date   • APPENDECTOMY     • OTHER ABDOMINAL SURGERY       Family History   Problem Relation Age of Onset   • Heart Disease Mother    • Diabetes Mother    • Heart Failure Mother    • Hypertension Mother    • Hypertension Father    • Heart Disease Brother    • Heart Attack Brother    • Hypertension Brother      Social History     Social History   • Marital status:      Spouse name: N/A   • Number of children: N/A   • Years of education: N/A     Occupational History   • Not on file.     Social History Main Topics   • Smoking status: Never Smoker   • Smokeless tobacco: Never Used   • Alcohol use 0.6 - 1.2 oz/week     1 - 2 Glasses of wine per week      Comment: occasional   • Drug use: No   • Sexual activity: Not on file     Other Topics Concern   • Not on file     Social History Narrative   • No narrative on file     Allergies   Allergen Reactions   • Bee Venom Anaphylaxis   • Contrast Media With Iodine [Iodine] Shortness of Breath   • Econazole Anaphylaxis   • Floxin [Ofloxacin] Anaphylaxis   • Keflex Shortness of Breath   • Levaquin Shortness of Breath   • Morphine Anaphylaxis   • Naloxone Hives     \"hives, SOB\"   • Nitrofurantoin Shortness of Breath     ...and hives     • Oxycodone Shortness of Breath     ...and hives     • Penicillins Shortness of Breath     ...and hives     • Ancef [Cefazolin] Hives   • Bactrim [Sulfamethoxazole W-Trimethoprim] Shortness of Breath   • Bextra [Valdecoxib] Rash     \"Skin burn and peeling.\"   • Other Drug Hives     \"Enconazole nitrate.\" shortness of breath and hives   • Other Misc Unspecified     Adhesive tape: blisters, skin peels off   • Norco [Apap-Fd&C Yellow #10 Al Tai-Hydrocodone] Shortness of Breath     ...and hives     • Tygacil [Tigecycline]      itching     Outpatient Encounter Prescriptions as of 10/19/2018   "   Medication Sig Dispense Refill   • topiramate (TOPAMAX) 100 MG Tab Take 200 mg by mouth 2 times a day.     • gabapentin (NEURONTIN) 300 MG Cap Take 300 mg by mouth 3 times a day.     • EPINEPHrine (EPIPEN) 0.3 MG/0.3ML Solution Auto-injector solution for injection 0.3 mg by Intramuscular route Once. Use as directed     • furosemide (LASIX) 20 MG Tab Take 1 Tab by mouth every day. 30 Tab 3   • potassium chloride SA (KDUR) 20 MEQ Tab CR Take 1 Tab by mouth every day. 30 Tab 5   • pantoprazole (PROTONIX) 40 MG Tablet Delayed Response 1 tab qhs  2   • colestipol (COLESTID) 1 GM Tab TK 2 TS PO BID  1   • lisinopril (PRINIVIL) 5 MG Tab Take 1 Tab by mouth every day. 90 Tab 2   • raNITidine (ZANTAC) 150 MG Tab Take 2 Tabs by mouth 2 times a day. 60 Tab 3   • montelukast (SINGULAIR) 10 MG Tab Take 1 Tab by mouth every day. 90 Tab 0   • meloxicam (MOBIC) 15 MG tablet Take 1 Tab by mouth every day. 30 Tab 1   • Frovatriptan Succinate (FROVA) 2.5 MG Tab Take 1 Tab by mouth as needed. 10 Tab 3   • DULoxetine (CYMBALTA) 60 MG Cap DR Particles delayed-release capsule Take 1 Cap by mouth every day. 90 Cap 3   • cevimeline (EVOXAC) 30 MG capsule Take 1 Cap by mouth 3 times a day. 90 Cap 3   • tizanidine (ZANAFLEX) 4 MG Tab Take 1 Tab by mouth every 6 hours as needed. (Patient taking differently: Take 8 mg by mouth every 8 hours as needed.) 30 Tab 3   • Coral Calcium 1000 (390 Ca) MG Tab Take 1 Tab by mouth every day.     • cetirizine (ZYRTEC) 10 MG Tab Take 10 mg by mouth every day.     • magnesium oxide (MAG-OX) 400 MG Tab Take 400 mg by mouth every day.     • Cyanocobalamin (VITAMIN B-12) 1000 MCG Tab Take 1,000 mcg by mouth every 48 hours.     • Biotin 5000 MCG Cap Take 1 Cap by mouth every day.     • Cholecalciferol (VITAMIN D) 2000 units Cap Take 1 Cap by mouth every day.     • Probiotic Product (PROBIOTIC PO) Take 1 Tab by mouth every day.     • Menaquinone-7 (VITAMIN K2) 100 MCG Cap Take 1 Tab by mouth every day.     •  "multivitamin (THERAGRAN) Tab Take 1 Tab by mouth every day.     • gabapentin (NEURONTIN) 400 MG Cap Take 1 Cap by mouth 3 times a day. (Patient not taking: Reported on 10/19/2018) 180 Cap 3   • linezolid (ZYVOX) 600 MG Tab Take 1 Tab by mouth every 12 hours. (Patient not taking: Reported on 10/19/2018) 20 Tab 0   • benzonatate (TESSALON) 200 MG capsule Take 1 Cap by mouth 3 times a day as needed. 30 Cap 0     No facility-administered encounter medications on file as of 10/19/2018.      Review of Systems   Constitutional: Negative for chills, fever, malaise/fatigue and weight loss.   HENT: Positive for congestion, ear pain, hearing loss and sore throat.    Eyes: Negative for double vision.   Respiratory: Positive for cough and shortness of breath.    Cardiovascular: Positive for orthopnea. Negative for chest pain, palpitations and leg swelling.   Gastrointestinal: Positive for abdominal pain, diarrhea, heartburn, nausea and vomiting.   Genitourinary: Negative for dysuria, frequency and hematuria.   Musculoskeletal: Positive for back pain, joint pain and neck pain.   Skin: Positive for itching and rash.   Neurological: Positive for headaches. Negative for dizziness and focal weakness.   Psychiatric/Behavioral: Negative for depression. The patient is not nervous/anxious and does not have insomnia.    All other systems reviewed and are negative.       Objective:   /101 (BP Location: Left arm, Patient Position: Sitting)   Pulse 92   Ht 1.626 m (5' 4\")   Wt 78.9 kg (174 lb)   SpO2 98%   BMI 29.87 kg/m²      Physical Exam   Constitutional: She is oriented to person, place, and time. She appears well-developed. She appears distressed.   HENT:   Head: Normocephalic and atraumatic.   Eyes: Pupils are equal, round, and reactive to light. EOM are normal.   Neck: No JVD present. No thyromegaly present.   Cardiovascular: Normal rate and regular rhythm.  Exam reveals no gallop.    No murmur heard.  Pulmonary/Chest: " Effort normal. No respiratory distress. She has decreased breath sounds. She has no rales.   Mild crackle right base   Abdominal: Soft. She exhibits no distension. There is no tenderness.   Musculoskeletal: Normal range of motion. She exhibits no edema or deformity.   Neurological: She is alert and oriented to person, place, and time.   Skin: Skin is warm. She is not diaphoretic. No erythema.   Psychiatric: She has a normal mood and affect. Her behavior is normal.     Labs from 10/12 BMP normal    Assessment:     1. Chronic diastolic congestive heart failure (HCC)  BASIC METABOLIC PANEL   2. Ascending aortic aneurysm (HCC)     3. Labile hypertension         Medical Decision Making:  Today's Assessment / Status / Plan:     Her BNP was markedly elevated. CT chest and her CXT also suggested heart failure.  I believe that significant part of her dyspnea is likely due to heart failure. Her old record has been requested by her other physician. Will review them once they are available.  Her recent echocardiography reportedly was technically limited. May need to repeat one.    I decided to start her on furosemide 20 mg/d and KCL 20 mEQ daily. Will repeat BNP.  Will keep her on the same dose of enalapril and allow her endocrinologist to manage the dose her antihypertensive for now to avoid confusion. Will recheck BMP in a week or two.     We discussed about ascending aortic aneurysm, its risk and indication of when to repair.  I reassured her and her  about it.  Will see her back in 4 week.  Will keep you posted about our further recommendations and they become available.   Thank you for allowing me to participate in the caring of this patient.      No Recipients

## 2018-10-19 NOTE — PROGRESS NOTES
"Chief Complaint   Patient presents with   • Heart failure and ascending aortic aneurysm     new patient       Subjective:   Donna Isaac is a 53 y.o. female who presents today for evaluation of above issues.    She is seen in consultation at the request of Dr. Ramirez.  She moved here from [place] 6 weeks ago or so.  She reported history of stress induced cardiomyopathy in August of 2015 diagnosed by cardiac catheterization in [place]. She was told that her cardiac function did return to normal.  She had no cardiac symptoms since.    She was admitted a few weeks ago with shortness of breath and hemoptysis. Denies subjective fever or chills. Troponin was mildly elevated. BNP was markedly elevated at 1359.  She was given antibiotics as well IV furosemide.   Echocardiograhy reported showed normal EF but the LV apex was not well seen.   CT chest reportedly showed mild ascending aortic aneurysm at 4 cm, along with \"Mild CHF with small bilateral pleural effusions\"  She improved but was only sent home on antibiotics and supplement O2.  She sleeps with her head elevated but stated for her GERD. She still uses O2 periodically.  She does not take her AM medication until 9 or as late as 11 AM even though she usually wakes up around 6 AM.    She was recently told by her GI about aneurysm.  She is quite concerned about it which she was not informed when she was discharged.    She stated that her BP has been rather labile, high in the morning but low at night. She has been referred to endocrinologist and been started on low dose enalapril and still undergoing testings at present.    Past Medical History:   Diagnosis Date   • Asthma    • Chronic pain    • Fibromyalgia    • GERD (gastroesophageal reflux disease)    • Migraine    • Osteoporosis    • Urticaria      Past Surgical History:   Procedure Laterality Date   • APPENDECTOMY     • OTHER ABDOMINAL SURGERY       Family History   Problem Relation Age of Onset   • Heart " "Disease Mother    • Diabetes Mother    • Heart Failure Mother    • Hypertension Mother    • Hypertension Father    • Heart Disease Brother    • Heart Attack Brother    • Hypertension Brother      Social History     Social History   • Marital status:      Spouse name: N/A   • Number of children: N/A   • Years of education: N/A     Occupational History   • Not on file.     Social History Main Topics   • Smoking status: Never Smoker   • Smokeless tobacco: Never Used   • Alcohol use 0.6 - 1.2 oz/week     1 - 2 Glasses of wine per week      Comment: occasional   • Drug use: No   • Sexual activity: Not on file     Other Topics Concern   • Not on file     Social History Narrative   • No narrative on file     Allergies   Allergen Reactions   • Bee Venom Anaphylaxis   • Contrast Media With Iodine [Iodine] Shortness of Breath   • Econazole Anaphylaxis   • Floxin [Ofloxacin] Anaphylaxis   • Keflex Shortness of Breath   • Levaquin Shortness of Breath   • Morphine Anaphylaxis   • Naloxone Hives     \"hives, SOB\"   • Nitrofurantoin Shortness of Breath     ...and hives     • Oxycodone Shortness of Breath     ...and hives     • Penicillins Shortness of Breath     ...and hives     • Ancef [Cefazolin] Hives   • Bactrim [Sulfamethoxazole W-Trimethoprim] Shortness of Breath   • Bextra [Valdecoxib] Rash     \"Skin burn and peeling.\"   • Other Drug Hives     \"Enconazole nitrate.\" shortness of breath and hives   • Other Misc Unspecified     Adhesive tape: blisters, skin peels off   • Norco [Apap-Fd&C Yellow #10 Al Tai-Hydrocodone] Shortness of Breath     ...and hives     • Tygacil [Tigecycline]      itching     Outpatient Encounter Prescriptions as of 10/19/2018   Medication Sig Dispense Refill   • topiramate (TOPAMAX) 100 MG Tab Take 200 mg by mouth 2 times a day.     • gabapentin (NEURONTIN) 300 MG Cap Take 300 mg by mouth 3 times a day.     • EPINEPHrine (EPIPEN) 0.3 MG/0.3ML Solution Auto-injector solution for injection 0.3 mg " by Intramuscular route Once. Use as directed     • furosemide (LASIX) 20 MG Tab Take 1 Tab by mouth every day. 30 Tab 3   • potassium chloride SA (KDUR) 20 MEQ Tab CR Take 1 Tab by mouth every day. 30 Tab 5   • pantoprazole (PROTONIX) 40 MG Tablet Delayed Response 1 tab qhs  2   • colestipol (COLESTID) 1 GM Tab TK 2 TS PO BID  1   • lisinopril (PRINIVIL) 5 MG Tab Take 1 Tab by mouth every day. 90 Tab 2   • raNITidine (ZANTAC) 150 MG Tab Take 2 Tabs by mouth 2 times a day. 60 Tab 3   • montelukast (SINGULAIR) 10 MG Tab Take 1 Tab by mouth every day. 90 Tab 0   • meloxicam (MOBIC) 15 MG tablet Take 1 Tab by mouth every day. 30 Tab 1   • Frovatriptan Succinate (FROVA) 2.5 MG Tab Take 1 Tab by mouth as needed. 10 Tab 3   • DULoxetine (CYMBALTA) 60 MG Cap DR Particles delayed-release capsule Take 1 Cap by mouth every day. 90 Cap 3   • cevimeline (EVOXAC) 30 MG capsule Take 1 Cap by mouth 3 times a day. 90 Cap 3   • tizanidine (ZANAFLEX) 4 MG Tab Take 1 Tab by mouth every 6 hours as needed. (Patient taking differently: Take 8 mg by mouth every 8 hours as needed.) 30 Tab 3   • Coral Calcium 1000 (390 Ca) MG Tab Take 1 Tab by mouth every day.     • cetirizine (ZYRTEC) 10 MG Tab Take 10 mg by mouth every day.     • magnesium oxide (MAG-OX) 400 MG Tab Take 400 mg by mouth every day.     • Cyanocobalamin (VITAMIN B-12) 1000 MCG Tab Take 1,000 mcg by mouth every 48 hours.     • Biotin 5000 MCG Cap Take 1 Cap by mouth every day.     • Cholecalciferol (VITAMIN D) 2000 units Cap Take 1 Cap by mouth every day.     • Probiotic Product (PROBIOTIC PO) Take 1 Tab by mouth every day.     • Menaquinone-7 (VITAMIN K2) 100 MCG Cap Take 1 Tab by mouth every day.     • multivitamin (THERAGRAN) Tab Take 1 Tab by mouth every day.     • gabapentin (NEURONTIN) 400 MG Cap Take 1 Cap by mouth 3 times a day. (Patient not taking: Reported on 10/19/2018) 180 Cap 3   • linezolid (ZYVOX) 600 MG Tab Take 1 Tab by mouth every 12 hours. (Patient not  "taking: Reported on 10/19/2018) 20 Tab 0   • benzonatate (TESSALON) 200 MG capsule Take 1 Cap by mouth 3 times a day as needed. 30 Cap 0     No facility-administered encounter medications on file as of 10/19/2018.      Review of Systems   Constitutional: Negative for chills, fever, malaise/fatigue and weight loss.   HENT: Positive for congestion, ear pain, hearing loss and sore throat.    Eyes: Negative for double vision.   Respiratory: Positive for cough and shortness of breath.    Cardiovascular: Positive for orthopnea. Negative for chest pain, palpitations and leg swelling.   Gastrointestinal: Positive for abdominal pain, diarrhea, heartburn, nausea and vomiting.   Genitourinary: Negative for dysuria, frequency and hematuria.   Musculoskeletal: Positive for back pain, joint pain and neck pain.   Skin: Positive for itching and rash.   Neurological: Positive for headaches. Negative for dizziness and focal weakness.   Psychiatric/Behavioral: Negative for depression. The patient is not nervous/anxious and does not have insomnia.    All other systems reviewed and are negative.       Objective:   /101 (BP Location: Left arm, Patient Position: Sitting)   Pulse 92   Ht 1.626 m (5' 4\")   Wt 78.9 kg (174 lb)   SpO2 98%   BMI 29.87 kg/m²     Physical Exam   Constitutional: She is oriented to person, place, and time. She appears well-developed. She appears distressed.   HENT:   Head: Normocephalic and atraumatic.   Eyes: Pupils are equal, round, and reactive to light. EOM are normal.   Neck: No JVD present. No thyromegaly present.   Cardiovascular: Normal rate and regular rhythm.  Exam reveals no gallop.    No murmur heard.  Pulmonary/Chest: Effort normal. No respiratory distress. She has decreased breath sounds. She has no rales.   Mild crackle right base   Abdominal: Soft. She exhibits no distension. There is no tenderness.   Musculoskeletal: Normal range of motion. She exhibits no edema or deformity. "   Neurological: She is alert and oriented to person, place, and time.   Skin: Skin is warm. She is not diaphoretic. No erythema.   Psychiatric: She has a normal mood and affect. Her behavior is normal.     Labs from 10/12 BMP normal    Assessment:     1. Chronic diastolic congestive heart failure (HCC)  BASIC METABOLIC PANEL   2. Ascending aortic aneurysm (HCC)     3. Labile hypertension         Medical Decision Making:  Today's Assessment / Status / Plan:     Her BNP was markedly elevated. CT chest and her CXT also suggested heart failure.  I believe that significant part of her dyspnea is likely due to heart failure. Her old record has been requested by her other physician. Will review them once they are available.  Her recent echocardiography reportedly was technically limited. May need to repeat one.    I decided to start her on furosemide 20 mg/d and KCL 20 mEQ daily. Will repeat BNP.  Will keep her on the same dose of enalapril and allow her endocrinologist to manage the dose her antihypertensive for now to avoid confusion. Will recheck BMP in a week or two.     We discussed about ascending aortic aneurysm, its risk and indication of when to repair.  I reassured her and her  about it.  Will see her back in 4 week.  Will keep you posted about our further recommendations and they become available.   Thank you for allowing me to participate in the caring of this patient.

## 2018-10-23 ENCOUNTER — HOSPITAL ENCOUNTER (OUTPATIENT)
Dept: RADIOLOGY | Facility: MEDICAL CENTER | Age: 53
End: 2018-10-23
Attending: STUDENT IN AN ORGANIZED HEALTH CARE EDUCATION/TRAINING PROGRAM
Payer: MEDICARE

## 2018-10-23 ENCOUNTER — HOSPITAL ENCOUNTER (OUTPATIENT)
Dept: LAB | Facility: MEDICAL CENTER | Age: 53
End: 2018-10-23
Attending: INTERNAL MEDICINE
Payer: MEDICARE

## 2018-10-23 DIAGNOSIS — I50.32 CHRONIC DIASTOLIC CONGESTIVE HEART FAILURE (HCC): ICD-10-CM

## 2018-10-23 DIAGNOSIS — Z09 HOSPITAL DISCHARGE FOLLOW-UP: ICD-10-CM

## 2018-10-23 DIAGNOSIS — I51.7 RIGHT HEART ENLARGEMENT: ICD-10-CM

## 2018-10-23 LAB
ANION GAP SERPL CALC-SCNC: 12 MMOL/L (ref 0–11.9)
BUN SERPL-MCNC: 24 MG/DL (ref 8–22)
CALCIUM SERPL-MCNC: 9.6 MG/DL (ref 8.5–10.5)
CHLORIDE SERPL-SCNC: 102 MMOL/L (ref 96–112)
CO2 SERPL-SCNC: 24 MMOL/L (ref 20–33)
CREAT SERPL-MCNC: 1.84 MG/DL (ref 0.5–1.4)
GLUCOSE SERPL-MCNC: 90 MG/DL (ref 65–99)
POTASSIUM SERPL-SCNC: 5.1 MMOL/L (ref 3.6–5.5)
SODIUM SERPL-SCNC: 138 MMOL/L (ref 135–145)

## 2018-10-23 PROCEDURE — 71046 X-RAY EXAM CHEST 2 VIEWS: CPT

## 2018-10-23 PROCEDURE — 36415 COLL VENOUS BLD VENIPUNCTURE: CPT

## 2018-10-23 PROCEDURE — 80048 BASIC METABOLIC PNL TOTAL CA: CPT

## 2018-10-25 ENCOUNTER — PATIENT MESSAGE (OUTPATIENT)
Dept: CARDIOLOGY | Facility: MEDICAL CENTER | Age: 53
End: 2018-10-25

## 2018-10-25 DIAGNOSIS — R79.89 CREATININE ELEVATION: ICD-10-CM

## 2018-10-25 DIAGNOSIS — I51.7 RIGHT HEART ENLARGEMENT: ICD-10-CM

## 2018-10-25 DIAGNOSIS — I50.9 CONGESTIVE HEART FAILURE, UNSPECIFIED HF CHRONICITY, UNSPECIFIED HEART FAILURE TYPE (HCC): ICD-10-CM

## 2018-10-25 NOTE — PROGRESS NOTES
She calls back.  She is taking Lisinopril 5 mg daily.    She is having problems when she lays down at night with SOB and coughing, but otherwise feels ok.    She will comply with the instructions and call us back for results.

## 2018-10-31 ENCOUNTER — HOSPITAL ENCOUNTER (OUTPATIENT)
Dept: LAB | Facility: MEDICAL CENTER | Age: 53
End: 2018-10-31
Attending: INTERNAL MEDICINE
Payer: MEDICARE

## 2018-10-31 ENCOUNTER — HOSPITAL ENCOUNTER (OUTPATIENT)
Dept: LAB | Facility: MEDICAL CENTER | Age: 53
End: 2018-10-31
Attending: OTOLARYNGOLOGY
Payer: MEDICARE

## 2018-10-31 DIAGNOSIS — R79.89 CREATININE ELEVATION: ICD-10-CM

## 2018-10-31 DIAGNOSIS — I50.9 CONGESTIVE HEART FAILURE, UNSPECIFIED HF CHRONICITY, UNSPECIFIED HEART FAILURE TYPE (HCC): ICD-10-CM

## 2018-10-31 DIAGNOSIS — I51.7 RIGHT HEART ENLARGEMENT: ICD-10-CM

## 2018-10-31 LAB
ANION GAP SERPL CALC-SCNC: 12 MMOL/L (ref 0–11.9)
BNP SERPL-MCNC: 236 PG/ML (ref 0–100)
BUN SERPL-MCNC: 15 MG/DL (ref 8–22)
CALCIUM SERPL-MCNC: 9.4 MG/DL (ref 8.5–10.5)
CHLORIDE SERPL-SCNC: 98 MMOL/L (ref 96–112)
CO2 SERPL-SCNC: 29 MMOL/L (ref 20–33)
CREAT SERPL-MCNC: 1.12 MG/DL (ref 0.5–1.4)
GLUCOSE SERPL-MCNC: 104 MG/DL (ref 65–99)
POTASSIUM SERPL-SCNC: 3.2 MMOL/L (ref 3.6–5.5)
SODIUM SERPL-SCNC: 139 MMOL/L (ref 135–145)

## 2018-10-31 PROCEDURE — 83880 ASSAY OF NATRIURETIC PEPTIDE: CPT

## 2018-10-31 PROCEDURE — 36415 COLL VENOUS BLD VENIPUNCTURE: CPT

## 2018-10-31 PROCEDURE — 80048 BASIC METABOLIC PNL TOTAL CA: CPT

## 2018-10-31 PROCEDURE — 87075 CULTR BACTERIA EXCEPT BLOOD: CPT | Mod: 91

## 2018-10-31 PROCEDURE — 87205 SMEAR GRAM STAIN: CPT

## 2018-10-31 PROCEDURE — 87070 CULTURE OTHR SPECIMN AEROBIC: CPT

## 2018-11-01 LAB
GRAM STN SPEC: NORMAL
GRAM STN SPEC: NORMAL
SIGNIFICANT IND 70042: NORMAL
SIGNIFICANT IND 70042: NORMAL
SITE SITE: NORMAL
SITE SITE: NORMAL
SOURCE SOURCE: NORMAL
SOURCE SOURCE: NORMAL

## 2018-11-03 LAB
BACTERIA WND AEROBE CULT: NORMAL
BACTERIA WND AEROBE CULT: NORMAL
GRAM STN SPEC: NORMAL
GRAM STN SPEC: NORMAL
SIGNIFICANT IND 70042: NORMAL
SIGNIFICANT IND 70042: NORMAL
SITE SITE: NORMAL
SITE SITE: NORMAL
SOURCE SOURCE: NORMAL
SOURCE SOURCE: NORMAL

## 2018-11-04 ENCOUNTER — OFFICE VISIT (OUTPATIENT)
Dept: URGENT CARE | Facility: CLINIC | Age: 53
End: 2018-11-04
Payer: MEDICARE

## 2018-11-04 ENCOUNTER — HOSPITAL ENCOUNTER (EMERGENCY)
Facility: MEDICAL CENTER | Age: 53
End: 2018-11-04
Attending: EMERGENCY MEDICINE
Payer: MEDICARE

## 2018-11-04 ENCOUNTER — APPOINTMENT (OUTPATIENT)
Dept: RADIOLOGY | Facility: IMAGING CENTER | Age: 53
End: 2018-11-04
Attending: NURSE PRACTITIONER
Payer: MEDICARE

## 2018-11-04 VITALS
SYSTOLIC BLOOD PRESSURE: 150 MMHG | WEIGHT: 170 LBS | HEART RATE: 103 BPM | OXYGEN SATURATION: 97 % | BODY MASS INDEX: 29.02 KG/M2 | DIASTOLIC BLOOD PRESSURE: 96 MMHG | TEMPERATURE: 97.4 F | HEIGHT: 64 IN

## 2018-11-04 VITALS
BODY MASS INDEX: 29.9 KG/M2 | RESPIRATION RATE: 16 BRPM | SYSTOLIC BLOOD PRESSURE: 148 MMHG | OXYGEN SATURATION: 98 % | TEMPERATURE: 98.6 F | DIASTOLIC BLOOD PRESSURE: 104 MMHG | HEART RATE: 98 BPM | WEIGHT: 174.16 LBS
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
## 2018-11-04 DIAGNOSIS — L08.9 INFECTED DOG BITE: ICD-10-CM

## 2018-11-04 DIAGNOSIS — L03.90 CELLULITIS, UNSPECIFIED CELLULITIS SITE: ICD-10-CM

## 2018-11-04 LAB
BACTERIA SPEC ANAEROBE CULT: NORMAL
SIGNIFICANT IND 70042: NORMAL
SITE SITE: NORMAL
SOURCE SOURCE: NORMAL

## 2018-11-04 PROCEDURE — 99284 EMERGENCY DEPT VISIT MOD MDM: CPT

## 2018-11-04 PROCEDURE — 73090 X-RAY EXAM OF FOREARM: CPT | Mod: 26,RT | Performed by: NURSE PRACTITIONER

## 2018-11-04 PROCEDURE — 700102 HCHG RX REV CODE 250 W/ 637 OVERRIDE(OP): Performed by: EMERGENCY MEDICINE

## 2018-11-04 PROCEDURE — 99214 OFFICE O/P EST MOD 30 MIN: CPT | Performed by: NURSE PRACTITIONER

## 2018-11-04 PROCEDURE — 73130 X-RAY EXAM OF HAND: CPT | Mod: 26,RT | Performed by: NURSE PRACTITIONER

## 2018-11-04 PROCEDURE — A9270 NON-COVERED ITEM OR SERVICE: HCPCS | Performed by: EMERGENCY MEDICINE

## 2018-11-04 RX ORDER — DOXYCYCLINE 100 MG/1
100 CAPSULE ORAL 2 TIMES DAILY
Qty: 14 CAP | Refills: 0 | Status: SHIPPED | OUTPATIENT
Start: 2018-11-04 | End: 2018-11-11

## 2018-11-04 RX ORDER — ALPRAZOLAM 1 MG/1
TABLET ORAL
COMMUNITY
Start: 2018-10-16 | End: 2018-11-04

## 2018-11-04 RX ORDER — CLINDAMYCIN HYDROCHLORIDE 150 MG/1
300 CAPSULE ORAL ONCE
Status: COMPLETED | OUTPATIENT
Start: 2018-11-04 | End: 2018-11-04

## 2018-11-04 RX ORDER — CLINDAMYCIN HYDROCHLORIDE 300 MG/1
300 CAPSULE ORAL 3 TIMES DAILY
Qty: 21 CAP | Refills: 0 | Status: SHIPPED | OUTPATIENT
Start: 2018-11-04 | End: 2018-11-11

## 2018-11-04 RX ORDER — DOXYCYCLINE 100 MG/1
100 TABLET ORAL EVERY 12 HOURS
Status: DISCONTINUED | OUTPATIENT
Start: 2018-11-04 | End: 2018-11-04 | Stop reason: HOSPADM

## 2018-11-04 RX ADMIN — CLINDAMYCIN HYDROCHLORIDE 300 MG: 150 CAPSULE ORAL at 11:18

## 2018-11-04 RX ADMIN — DOXYCYCLINE 100 MG: 100 TABLET, FILM COATED ORAL at 11:18

## 2018-11-04 ASSESSMENT — ENCOUNTER SYMPTOMS
PSYCHIATRIC NEGATIVE: 1
NEUROLOGICAL NEGATIVE: 1
RESPIRATORY NEGATIVE: 1
GASTROINTESTINAL NEGATIVE: 1
CARDIOVASCULAR NEGATIVE: 1
CONSTITUTIONAL NEGATIVE: 1

## 2018-11-04 ASSESSMENT — PAIN SCALES - WONG BAKER: WONGBAKER_NUMERICALRESPONSE: HURTS JUST A LITTLE BIT

## 2018-11-04 ASSESSMENT — PAIN SCALES - GENERAL: PAINLEVEL_OUTOF10: 5

## 2018-11-04 NOTE — DISCHARGE INSTRUCTIONS
Cellulitis, Adult  Introduction  Cellulitis is a skin infection. The infected area is usually red and sore. This condition occurs most often in the arms and lower legs. It is very important to get treated for this condition.  Follow these instructions at home:  · Take over-the-counter and prescription medicines only as told by your doctor.  · If you were prescribed an antibiotic medicine, take it as told by your doctor. Do not stop taking the antibiotic even if you start to feel better.  · Drink enough fluid to keep your pee (urine) clear or pale yellow.  · Do not touch or rub the infected area.  · Raise (elevate) the infected area above the level of your heart while you are sitting or lying down.  · Place warm or cold wet cloths (warm or cold compresses) on the infected area. Do this as told by your doctor.  · Keep all follow-up visits as told by your doctor. This is important. These visits let your doctor make sure your infection is not getting worse.  Contact a doctor if:  · You have a fever.  · Your symptoms do not get better after 1-2 days of treatment.  · Your bone or joint under the infected area starts to hurt after the skin has healed.  · Your infection comes back. This can happen in the same area or another area.  · You have a swollen bump in the infected area.  · You have new symptoms.  · You feel ill and also have muscle aches and pains.  Get help right away if:  · Your symptoms get worse.  · You feel very sleepy.  · You throw up (vomit) or have watery poop (diarrhea) for a long time.  · There are red streaks coming from the infected area.  · Your red area gets larger.  · Your red area turns darker.  This information is not intended to replace advice given to you by your health care provider. Make sure you discuss any questions you have with your health care provider.  Document Released: 06/05/2009 Document Revised: 05/25/2017 Document Reviewed: 10/26/2016  © 2017 [employer]  Animal  Bite  Introduction  Animal bite wounds can get infected. It is important to get proper medical treatment. Ask your doctor if you need rabies treatment.  Follow these instructions at home:  Wound care  · Follow instructions from your doctor about how to take care of your wound. Make sure you:  ¨ Wash your hands with soap and water before you change your bandage (dressing). If you cannot use soap and water, use hand .  ¨ Change your bandage as told by your doctor.  ¨ Leave stitches (sutures), skin glue, or skin tape (adhesive) strips in place. They may need to stay in place for 2 weeks or longer. If tape strips get loose and curl up, you may trim the loose edges. Do not remove tape strips completely unless your doctor says it is okay.  · Check your wound every day for signs of infection. Watch for:  ¨ Redness, swelling, or pain that gets worse.  ¨ Fluid, blood, or pus.  General instructions  · Take or apply over-the-counter and prescription medicines only as told by your doctor.  · If you were prescribed an antibiotic, take or apply it as told by your doctor. Do not stop using the antibiotic even if your condition improves.  · Keep the injured area raised (elevated) above the level of your heart while you are sitting or lying down.  · If directed, apply ice to the injured area.  ¨ Put ice in a plastic bag.  ¨ Place a towel between your skin and the bag.  ¨ Leave the ice on for 20 minutes, 2-3 times per day.  · Keep all follow-up visits as told by your doctor. This is important.  Contact a doctor if:  · You have redness, swelling, or pain that gets worse.  · You have a general feeling of sickness (malaise).  · You feel sick to your stomach (nauseous).  · You throw up (vomit).  · You have pain that does not get better.  Get help right away if:  · You have a red streak going away from your wound.  · You have fluid, blood, or pus coming from your wound.  · You have a fever or chills.  · You have trouble moving  your injured area.  · You have numbness or tingling anywhere on your body.  This information is not intended to replace advice given to you by your health care provider. Make sure you discuss any questions you have with your health care provider.  Document Released: 12/18/2006 Document Revised: 05/25/2017 Document Reviewed: 05/04/2016  © 2017 [employer]

## 2018-11-04 NOTE — ED PROVIDER NOTES
ED Provider Note    CHIEF COMPLAINT  Chief Complaint   Patient presents with   • Dog Bite       HPI  Donna Isaac is a 53 y.o. female who presents with a history of dog bite to the right arm, there was an altercation between her 2 dogs and she reached into intervene.  3 days ago she was bitten in the right forearm, she drained some pus from the area yesterday, today she has erythema surrounding the area.  She has had no vomiting or systemic symptoms.  The patient reports she is up-to-date with tetanus.  She reports that her dogs have been vaccinated for rabies.  They have been acting normally after the incident.    REVIEW OF SYSTEMS  See HPI for further details. All other systems are negative.     PAST MEDICAL HISTORY   has a past medical history of AAA (abdominal aortic aneurysm) (HCC); Asthma; Chronic pain; Congestive heart failure (HCC); Fibromyalgia; GERD (gastroesophageal reflux disease); Hypertension; Migraine; Osteoporosis; and Urticaria.    SOCIAL HISTORY  Social History     Social History Main Topics   • Smoking status: Never Smoker   • Smokeless tobacco: Never Used   • Alcohol use 0.6 - 1.2 oz/week     1 - 2 Glasses of wine per week      Comment: occasional   • Drug use: No   • Sexual activity: Not on file       SURGICAL HISTORY   has a past surgical history that includes other abdominal surgery and appendectomy.    CURRENT MEDICATIONS  EPINEPHrine (EPIPEN) 0.3 MG/0.3ML Solution Auto-injector solution for injection     Si.3 mg by Intramuscular route Once. Use as directed   Class: Historical Med   Route: Intramuscular   furosemide (LASIX) 20 MG Tab 10/19/2018    Sig: Take 1 Tab by mouth every day.   Route: Oral   Number of times this order has been changed since signin     Order Audit [place]     potassium chloride SA (KDUR) 20 MEQ Tab CR 10/19/2018    Sig: Take 1 Tab by mouth every day.   Route: Oral   Number of times this order has been changed since signin     Order Audit [place]      pantoprazole (PROTONIX) 40 MG Tablet Delayed Response 10/6/2018    Si tab qhs   Class: Historical Med   Number of times this order has been changed since signin     Order Audit [place]     colestipol (COLESTID) 1 GM Tab 10/6/2018    Sig: TK 2 TS PO BID   Class: Historical Med   lisinopril (PRINIVIL) 5 MG Tab 10/11/2018    Sig: Take 1 Tab by mouth every day.   Route: Oral   Number of times this order has been changed since signin     Order Audit [place]     raNITidine (ZANTAC) 150 MG Tab 10/11/2018    Sig: Take 2 Tabs by mouth 2 times a day.   Route: Oral   Number of times this order has been changed since signin     Order Audit [place]     montelukast (SINGULAIR) 10 MG Tab 10/11/2018    Sig: Take 1 Tab by mouth every day.   Route: Oral   Number of times this order has been changed since signin     Order Audit [place]     meloxicam (MOBIC) 15 MG tablet 10/11/2018    Sig: Take 1 Tab by mouth every day.   Route: Oral   Number of times this order has been changed since signin     Order Audit [place]     gabapentin (NEURONTIN) 400 MG Cap 10/11/2018    Sig: Take 1 Cap by mouth 3 times a day.   Route: Oral   Number of times this order has been changed since signin     Order Audit [place]     Frovatriptan Succinate (FROVA) 2.5 MG Tab 10/11/2018    Sig: Take 1 Tab by mouth as needed.   Route: Oral   Number of times this order has been changed since signin     Order Audit [place]     DULoxetine (CYMBALTA) 60 MG Cap DR Particles delayed-release capsule 10/11/2018    Sig: Take 1 Cap by mouth every day.   Route: Oral   Number of times this order has been changed since signin     Order Audit [place]     cevimeline (EVOXAC) 30 MG capsule 10/11/2018    Sig: Take 1 Cap by mouth 3 times a day.   Route: Oral   Number of times this order has been changed since signin     Order Audit [place]     linezolid (ZYVOX) 600 MG Tab 2018    Sig: Take 1 Tab by mouth every 12 hours.   Route: Oral   Number of times this  order has been changed since signin     Order Audit [place]     Coral Calcium 1000 (390 Ca) MG Tab     Sig: Take 1 Tab by mouth every day.   Class: Historical Med   Route: Oral   Number of times this order has been changed since signin     Order Audit [place]     cetirizine (ZYRTEC) 10 MG Tab     Sig: Take 10 mg by mouth every day.   Class: Historical Med   Route: Oral   Number of times this order has been changed since signin     Order Audit [place]     magnesium oxide (MAG-OX) 400 MG Tab     Sig: Take 400 mg by mouth every day.   Class: Historical Med   Route: Oral   Number of times this order has been changed since signin     Order Audit [place]     Cyanocobalamin (VITAMIN B-12) 1000 MCG Tab     Sig: Take 1,000 mcg by mouth every 48 hours.   Class: Historical Med   Route: Oral   Number of times this order has been changed since signin     Order Audit [place]     Biotin 5000 MCG Cap     Sig: Take 1 Cap by mouth every day.   Class: Historical Med   Route: Oral   Number of times this order has been changed since signin     Order Audit [place]     Cholecalciferol (VITAMIN D) 2000 units Cap     Sig: Take 1 Cap by mouth every day.   Class: Historical Med   Route: Oral   Number of times this order has been changed since signin     Order Audit [place]     Probiotic Product (PROBIOTIC PO)     Sig: Take 1 Tab by mouth every day.   Class: Historical Med   Route: Oral   Number of times this order has been changed since signin     Order Audit [place]     Menaquinone-7 (VITAMIN K2) 100 MCG Cap     Sig: Take 1 Tab by mouth every day.   Class: Historical Med   Route: Oral   Number of times this order has been changed since signin     Order Audit [place]     multivitamin (THERAGRAN) Tab     Sig: Take 1 Tab by mouth every day.   Class: Historical Med   Route: Oral         ALLERGIES  Allergies   Allergen Reactions   • Bee Venom Anaphylaxis   • Contrast Media With Iodine [Iodine] Shortness of Breath   • Econazole  "Anaphylaxis   • Floxin [Ofloxacin] Anaphylaxis   • Keflex Shortness of Breath   • Levaquin Shortness of Breath   • Morphine Anaphylaxis   • Naloxone Hives     \"hives, SOB\"   • Nitrofurantoin Shortness of Breath     ...and hives     • Oxycodone Shortness of Breath     ...and hives     • Penicillins Shortness of Breath     ...and hives     • Ancef [Cefazolin] Hives   • Bactrim [Sulfamethoxazole W-Trimethoprim] Shortness of Breath   • Bextra [Valdecoxib] Rash     \"Skin burn and peeling.\"   • Other Drug Hives     \"Enconazole nitrate.\" shortness of breath and hives   • Other Misc Unspecified     Adhesive tape: blisters, skin peels off   • Norco [Apap-Fd&C Yellow #10 Al Tai-Hydrocodone] Shortness of Breath     ...and hives     • Tygacil [Tigecycline]      itching       PHYSICAL EXAM  VITAL SIGNS: /104   Pulse 100   Temp 37 °C (98.6 °F)   Resp 18   Wt 79 kg (174 lb 2.6 oz)   SpO2 98%   BMI 29.90 kg/m²  @TIM[[id number]::@   Pulse ox interpretation: I interpret this pulse ox as normal.  Constitutional: Alert in no apparent distress.  HENT: No signs of trauma, Bilateral external ears normal, Nose normal.   Eyes: Pupils are equal and reactive, Conjunctiva normal, Non-icteric.   Neck: Normal range of motion, No tenderness, Supple, No stridor.   Lymphatic: No lymphadenopathy noted.   Cardiovascular: Regular rate and rhythm, no murmurs.   Thorax & Lungs: Normal breath sounds, No respiratory distress, No wheezing, No chest tenderness.   Abdomen: Bowel sounds normal, Soft, No tenderness, No masses, No pulsatile masses. No peritoneal signs.  Skin: Warm, Dry, No erythema, No rash.   Back: No bony tenderness, No CVA tenderness.   Extremities: Intact distal pulses, the patient has erythema of the right forearm, there are bite marks.  There is no evidence of fluctuance or abscess.  Musculoskeletal: Good range of motion in all major joints. No tenderness to palpation or major deformities noted.   Neurologic: Alert , Normal motor " function, Normal sensory function, No focal deficits noted.   Psychiatric: Affect normal, Judgment normal, Mood normal.       DIAGNOSTIC STUDIES / PROCEDURES      RADIOLOGY  The patient had an x-ray of the right forearm and hand at urgent care today that was unremarkable      COURSE & MEDICAL DECISION MAKING  Pertinent Labs & Imaging studies reviewed. (See chart for details)    I reviewed the patient's previous chart, she had an x-ray today of the hand and forearm at urgent care that was negative for fracture or foreign body.    The patient has multiple allergies, the best choice for her dog bite will be doxycycline and clindamycin, she was given 1 dose here and a prescription for 1 week of each.  She will return for worsening redness, vomiting, fever, worsening symptoms, we have drawn a [place] around the area of cellulitis.  The patient has no abscess that can be drained.         The patient will return for new or worsening symptoms and is stable at the time of discharge.    The patient is referred to her primary physician for blood pressure management, diabetic screening, and for all other preventative health concerns.      DISPOSITION:  Patient will be discharged home in stable condition.    FOLLOW UP:  [facility], Emergency Dept  [address]  [facility] [id number] [phone]    If symptoms worsen, Worsening redness, not improving after 2-3 days, vomiting    Benson Ramirez M.D.  [address] [id number] [phone]      As needed      OUTPATIENT MEDICATIONS:  New Prescriptions    CLINDAMYCIN (CLEOCIN) 300 MG CAP    Take 1 Cap by mouth 3 times a day for 7 days.    DOXYCYCLINE (MONODOX) 100 MG CAPSULE    Take 1 Cap by mouth 2 times a day for 7 days.         The patient will return for worsening symptoms and is stable at the time of discharge. The patient verbalizes understanding and will comply.    FINAL IMPRESSION   1. Dog bite, initial encounter    2.  Cellulitis, right forearm              Electronically signed by: Mitch Juarez, 11/4/2018 11:06 AM

## 2018-11-05 ENCOUNTER — PATIENT OUTREACH (OUTPATIENT)
Dept: HEALTH INFORMATION MANAGEMENT | Facility: OTHER | Age: 53
End: 2018-11-05

## 2018-11-19 ENCOUNTER — OFFICE VISIT (OUTPATIENT)
Dept: CARDIOLOGY | Facility: MEDICAL CENTER | Age: 53
End: 2018-11-19
Payer: MEDICARE

## 2018-11-19 VITALS
BODY MASS INDEX: 30.22 KG/M2 | HEART RATE: 84 BPM | HEIGHT: 64 IN | SYSTOLIC BLOOD PRESSURE: 130 MMHG | OXYGEN SATURATION: 99 % | DIASTOLIC BLOOD PRESSURE: 86 MMHG | WEIGHT: 177 LBS

## 2018-11-19 DIAGNOSIS — I50.30 DIASTOLIC CONGESTIVE HEART FAILURE, UNSPECIFIED HF CHRONICITY (HCC): ICD-10-CM

## 2018-11-19 DIAGNOSIS — I71.21 ASCENDING AORTIC ANEURYSM (HCC): ICD-10-CM

## 2018-11-19 DIAGNOSIS — R09.89 LABILE HYPERTENSION: ICD-10-CM

## 2018-11-19 DIAGNOSIS — I87.2 VENOUS INSUFFICIENCY OF LEFT LOWER EXTREMITY: ICD-10-CM

## 2018-11-19 PROCEDURE — 99214 OFFICE O/P EST MOD 30 MIN: CPT | Performed by: INTERNAL MEDICINE

## 2018-11-19 ASSESSMENT — ENCOUNTER SYMPTOMS
NERVOUS/ANXIOUS: 0
WEIGHT LOSS: 0
DIZZINESS: 0
PALPITATIONS: 0
SHORTNESS OF BREATH: 1
DIARRHEA: 1

## 2018-11-19 NOTE — PROGRESS NOTES
Shortness of breath  Recent hospitalization with new testing and starting on new medications      Subjective:   Donna Isaac is a 53 y.o. female who presents today for follow-up of above issues.    She was seen a month ago after hospitalization in September with severe respiratory failure. Brain natriuretic peptide was markedly elevated.  Her LV systolic function was normal by echocardiography during admission.  The patient reported history of stress induced cardiomyopathy a few years ago.  She was still on oxygen supplementation a few weeks ago.   She was also noted to have mild lower extremity edema during her last visit  We decided to start her on furosemidet.  She reported some cough when she lied down at night to sleep.  The edema in her right leg has improved but has not resolved although she still has some edema in the left leg which is chronic to her.  She was told in the past that she has venous insufficiency.  Repeat brain natriuretic peptide 2 weeks ago came down to 236.  She however stated she seems to breathe better with supplemental oxygen and still have to sleep with 2-3 pillows.  Initially her potassium level was somewhat high.  We advised her to attempt to discontinue potassium and resume 20 mEq daily couple weeks ago    Past Medical History:   Diagnosis Date   • AAA (abdominal aortic aneurysm) (HCC)    • Asthma    • Chronic pain    • Congestive heart failure (HCC)    • Fibromyalgia    • GERD (gastroesophageal reflux disease)    • Hypertension    • Migraine    • Osteoporosis    • Urticaria      Past Surgical History:   Procedure Laterality Date   • APPENDECTOMY     • OTHER ABDOMINAL SURGERY       Family History   Problem Relation Age of Onset   • Heart Disease Mother    • Diabetes Mother    • Heart Failure Mother    • Hypertension Mother    • Hypertension Father    • Heart Disease Brother    • Heart Attack Brother    • Hypertension Brother      Social History     Social History   • Marital  "status:      Spouse name: N/A   • Number of children: N/A   • Years of education: N/A     Occupational History   • Not on file.     Social History Main Topics   • Smoking status: Never Smoker   • Smokeless tobacco: Never Used   • Alcohol use 0.6 - 1.2 oz/week     1 - 2 Glasses of wine per week      Comment: occasional   • Drug use: No   • Sexual activity: Not on file     Other Topics Concern   • Not on file     Social History Narrative   • No narrative on file     Allergies   Allergen Reactions   • Bee Venom Anaphylaxis   • Contrast Media With Iodine [Iodine] Shortness of Breath   • Econazole Anaphylaxis   • Floxin [Ofloxacin] Anaphylaxis   • Keflex Shortness of Breath   • Levaquin Shortness of Breath   • Morphine Anaphylaxis   • Naloxone Hives     \"hives, SOB\"   • Nitrofurantoin Shortness of Breath     ...and hives     • Oxycodone Shortness of Breath     ...and hives     • Penicillins Shortness of Breath     ...and hives     • Ancef [Cefazolin] Hives   • Bactrim [Sulfamethoxazole W-Trimethoprim] Shortness of Breath   • Bextra [Valdecoxib] Rash     \"Skin burn and peeling.\"   • Other Drug Hives     \"Enconazole nitrate.\" shortness of breath and hives   • Other Misc Unspecified     Adhesive tape: blisters, skin peels off   • Norco [Apap-Fd&C Yellow #10 Al Tai-Hydrocodone] Shortness of Breath     ...and hives     • Tygacil [Tigecycline]      itching     Outpatient Encounter Prescriptions as of 11/19/2018   Medication Sig Dispense Refill   • furosemide (LASIX) 20 MG Tab Take 1 Tab by mouth every day. 30 Tab 3   • potassium chloride SA (KDUR) 20 MEQ Tab CR Take 1 Tab by mouth every day. 30 Tab 5   • pantoprazole (PROTONIX) 40 MG Tablet Delayed Response 1 tab qhs  2   • colestipol (COLESTID) 1 GM Tab TK 2 TS PO BID  1   • lisinopril (PRINIVIL) 5 MG Tab Take 1 Tab by mouth every day. 90 Tab 2   • raNITidine (ZANTAC) 150 MG Tab Take 2 Tabs by mouth 2 times a day. 60 Tab 3   • montelukast (SINGULAIR) 10 MG Tab Take 1 " "Tab by mouth every day. 90 Tab 0   • meloxicam (MOBIC) 15 MG tablet Take 1 Tab by mouth every day. 30 Tab 1   • gabapentin (NEURONTIN) 400 MG Cap Take 1 Cap by mouth 3 times a day. 180 Cap 3   • DULoxetine (CYMBALTA) 60 MG Cap DR Particles delayed-release capsule Take 1 Cap by mouth every day. 90 Cap 3   • cevimeline (EVOXAC) 30 MG capsule Take 1 Cap by mouth 3 times a day. 90 Cap 3   • linezolid (ZYVOX) 600 MG Tab Take 1 Tab by mouth every 12 hours. 20 Tab 0   • Coral Calcium 1000 (390 Ca) MG Tab Take 1 Tab by mouth every day.     • cetirizine (ZYRTEC) 10 MG Tab Take 10 mg by mouth every day.     • magnesium oxide (MAG-OX) 400 MG Tab Take 400 mg by mouth every day.     • Cyanocobalamin (VITAMIN B-12) 1000 MCG Tab Take 1,000 mcg by mouth every 48 hours.     • Biotin 5000 MCG Cap Take 1 Cap by mouth every day.     • Cholecalciferol (VITAMIN D) 2000 units Cap Take 1 Cap by mouth every day.     • Probiotic Product (PROBIOTIC PO) Take 1 Tab by mouth every day.     • Menaquinone-7 (VITAMIN K2) 100 MCG Cap Take 1 Tab by mouth every day.     • multivitamin (THERAGRAN) Tab Take 1 Tab by mouth every day.     • EPINEPHrine (EPIPEN) 0.3 MG/0.3ML Solution Auto-injector solution for injection 0.3 mg by Intramuscular route Once. Use as directed     • Frovatriptan Succinate (FROVA) 2.5 MG Tab Take 1 Tab by mouth as needed. 10 Tab 3     No facility-administered encounter medications on file as of 11/19/2018.      Review of Systems   Constitutional: Negative for weight loss.   Respiratory: Positive for shortness of breath.    Cardiovascular: Positive for leg swelling. Negative for chest pain and palpitations.   Gastrointestinal: Positive for diarrhea.   Neurological: Negative for dizziness.   Psychiatric/Behavioral: The patient is not nervous/anxious.         Objective:   /86 (BP Location: Left arm, Patient Position: Sitting)   Pulse 84   Ht 1.626 m (5' 4\")   Wt 80.3 kg (177 lb)   SpO2 99%   BMI 30.38 kg/m²     Physical " Exam   Constitutional: She appears well-nourished. No distress.   Eyes: Pupils are equal, round, and reactive to light. EOM are normal.   Neck: No JVD present. No thyromegaly present.   Cardiovascular: Normal rate and regular rhythm.  Exam reveals no gallop.    No murmur heard.  Pulmonary/Chest: Effort normal. No respiratory distress. She has no rales.   Abdominal: Soft. There is no tenderness.   Musculoskeletal: She exhibits edema.   Neurological: She is alert.   Skin: Skin is warm.   Psychiatric: Her behavior is normal.       Assessment:     1. Congestive heart failure, unspecified HF chronicity, unspecified heart failure type (HCC)  BASIC METABOLIC PANEL   2. Ascending aortic aneurysm (HCC)     3. Labile hypertension     4. Venous insufficiency of left lower extremity  US-EXTREMITY VENOUS LOWER UNILAT LEFT       Medical Decision Making:  Today's Assessment / Status / Plan:     Overall she appear to be improving.  We will continue her current cardiac medications except that she was advised to take extra dose of furosemide in the afternoon for the next week or so to see if her cough would improve and to see whether that would allow her to discontinue using oxygen at night.  We will check basic metabolic panel in a few weeks to ensure that her potassium level is in the range.  We will reassess her venous insufficiency of the left lower extremity with venous duplex ultrasound.  We will see her back in a few month for follow-up or sooner as needed.

## 2018-11-19 NOTE — LETTER
[facility]   [address], [address]  [place] [address]  Phone: [phone]  Fax: [phone]              Donna Isaac  1965    Encounter Date: 11/19/2018    Adriana Carlson M.D.          PROGRESS NOTE:  Shortness of breath  Recent hospitalization with new testing and starting on new medications      Subjective:   Donna Isaac is a 53 y.o. female who presents today for follow-up of above issues.    She was seen a month ago after hospitalization in September with severe respiratory failure. Brain natriuretic peptide was markedly elevated.  Her LV systolic function was normal by echocardiography during admission.  The patient reported history of stress endocrinopathy a few years ago.  She was still on oxygen supplementation a few weeks ago.   She was also noted to have mild lower extremity edema during her last visit  We decided to start her on furosemidet.  She reported some cough when she lied down at night to sleep.  The edema in her right leg has improved but has resolved although she still has trace edema in the left leg which is chronic to her.  She was told in the past that she has venous insufficiency.  Repeat brain natriuretic peptide 2 weeks ago came down to 236.  She however stated she seems to breathe better with supplemental oxygen and still have to sleep with 2-3 pillows.  Initially her potassium level was somewhat high.  We advised her to attempt to discontinue potassium and resume 20 mEq daily couple weeks ago    Past Medical History:   Diagnosis Date   • AAA (abdominal aortic aneurysm) (HCC)    • Asthma    • Chronic pain    • Congestive heart failure (HCC)    • Fibromyalgia    • GERD (gastroesophageal reflux disease)    • Hypertension    • Migraine    • Osteoporosis    • Urticaria      Past Surgical History:   Procedure Laterality Date   • APPENDECTOMY     • OTHER ABDOMINAL SURGERY       Family History   Problem Relation Age of  "Onset   • Heart Disease Mother    • Diabetes Mother    • Heart Failure Mother    • Hypertension Mother    • Hypertension Father    • Heart Disease Brother    • Heart Attack Brother    • Hypertension Brother      Social History     Social History   • Marital status:      Spouse name: N/A   • Number of children: N/A   • Years of education: N/A     Occupational History   • Not on file.     Social History Main Topics   • Smoking status: Never Smoker   • Smokeless tobacco: Never Used   • Alcohol use 0.6 - 1.2 oz/week     1 - 2 Glasses of wine per week      Comment: occasional   • Drug use: No   • Sexual activity: Not on file     Other Topics Concern   • Not on file     Social History Narrative   • No narrative on file     Allergies   Allergen Reactions   • Bee Venom Anaphylaxis   • Contrast Media With Iodine [Iodine] Shortness of Breath   • Econazole Anaphylaxis   • Floxin [Ofloxacin] Anaphylaxis   • Keflex Shortness of Breath   • Levaquin Shortness of Breath   • Morphine Anaphylaxis   • Naloxone Hives     \"hives, SOB\"   • Nitrofurantoin Shortness of Breath     ...and hives     • Oxycodone Shortness of Breath     ...and hives     • Penicillins Shortness of Breath     ...and hives     • Ancef [Cefazolin] Hives   • Bactrim [Sulfamethoxazole W-Trimethoprim] Shortness of Breath   • Bextra [Valdecoxib] Rash     \"Skin burn and peeling.\"   • Other Drug Hives     \"Enconazole nitrate.\" shortness of breath and hives   • Other Misc Unspecified     Adhesive tape: blisters, skin peels off   • Norco [Apap-Fd&C Yellow #10 Al Tai-Hydrocodone] Shortness of Breath     ...and hives     • Tygacil [Tigecycline]      itching     Outpatient Encounter Prescriptions as of 11/19/2018   Medication Sig Dispense Refill   • furosemide (LASIX) 20 MG Tab Take 1 Tab by mouth every day. 30 Tab 3   • potassium chloride SA (KDUR) 20 MEQ Tab CR Take 1 Tab by mouth every day. 30 Tab 5   • pantoprazole (PROTONIX) 40 MG Tablet Delayed Response 1 tab " qhs  2   • colestipol (COLESTID) 1 GM Tab TK 2 TS PO BID  1   • lisinopril (PRINIVIL) 5 MG Tab Take 1 Tab by mouth every day. 90 Tab 2   • raNITidine (ZANTAC) 150 MG Tab Take 2 Tabs by mouth 2 times a day. 60 Tab 3   • montelukast (SINGULAIR) 10 MG Tab Take 1 Tab by mouth every day. 90 Tab 0   • meloxicam (MOBIC) 15 MG tablet Take 1 Tab by mouth every day. 30 Tab 1   • gabapentin (NEURONTIN) 400 MG Cap Take 1 Cap by mouth 3 times a day. 180 Cap 3   • DULoxetine (CYMBALTA) 60 MG Cap DR Particles delayed-release capsule Take 1 Cap by mouth every day. 90 Cap 3   • cevimeline (EVOXAC) 30 MG capsule Take 1 Cap by mouth 3 times a day. 90 Cap 3   • linezolid (ZYVOX) 600 MG Tab Take 1 Tab by mouth every 12 hours. 20 Tab 0   • Coral Calcium 1000 (390 Ca) MG Tab Take 1 Tab by mouth every day.     • cetirizine (ZYRTEC) 10 MG Tab Take 10 mg by mouth every day.     • magnesium oxide (MAG-OX) 400 MG Tab Take 400 mg by mouth every day.     • Cyanocobalamin (VITAMIN B-12) 1000 MCG Tab Take 1,000 mcg by mouth every 48 hours.     • Biotin 5000 MCG Cap Take 1 Cap by mouth every day.     • Cholecalciferol (VITAMIN D) 2000 units Cap Take 1 Cap by mouth every day.     • Probiotic Product (PROBIOTIC PO) Take 1 Tab by mouth every day.     • Menaquinone-7 (VITAMIN K2) 100 MCG Cap Take 1 Tab by mouth every day.     • multivitamin (THERAGRAN) Tab Take 1 Tab by mouth every day.     • EPINEPHrine (EPIPEN) 0.3 MG/0.3ML Solution Auto-injector solution for injection 0.3 mg by Intramuscular route Once. Use as directed     • Frovatriptan Succinate (FROVA) 2.5 MG Tab Take 1 Tab by mouth as needed. 10 Tab 3     No facility-administered encounter medications on file as of 11/19/2018.      Review of Systems   Constitutional: Negative for weight loss.   Respiratory: Positive for shortness of breath.    Cardiovascular: Positive for leg swelling. Negative for chest pain and palpitations.   Gastrointestinal: Positive for diarrhea.   Neurological: Negative  "for dizziness.   Psychiatric/Behavioral: The patient is not nervous/anxious.         Objective:   /86 (BP Location: Left arm, Patient Position: Sitting)   Pulse 84   Ht 1.626 m (5' 4\")   Wt 80.3 kg (177 lb)   SpO2 99%   BMI 30.38 kg/m²      Physical Exam   Constitutional: She appears well-nourished. No distress.   Eyes: Pupils are equal, round, and reactive to light. EOM are normal.   Neck: No JVD present. No thyromegaly present.   Cardiovascular: Normal rate and regular rhythm.  Exam reveals no gallop.    No murmur heard.  Pulmonary/Chest: Effort normal. No respiratory distress. She has no rales.   Abdominal: Soft. There is no tenderness.   Musculoskeletal: She exhibits edema.   Neurological: She is alert.   Skin: Skin is warm.   Psychiatric: Her behavior is normal.       Assessment:     1. Congestive heart failure, unspecified HF chronicity, unspecified heart failure type (HCC)  BASIC METABOLIC PANEL   2. Ascending aortic aneurysm (HCC)     3. Labile hypertension     4. Venous insufficiency of left lower extremity  US-EXTREMITY VENOUS LOWER UNILAT LEFT       Medical Decision Making:  Today's Assessment / Status / Plan:     Overall she appear to be improving.  We will continue her current cardiac medications except that she was however advised to take extra dose of furosemide in the afternoon for the next week or so to see if her cough would improve and to see whether that would allow her to discontinue using oxygen at night.  We will check basic metabolic panel in a few weeks to ensure that her potassium level is in the range.  We will reassess her venous insufficiency left lower extremity venous duplex ultrasound.  We will see her back in a few month for follow-up or sooner as needed.      No Recipients              "

## 2018-11-28 ENCOUNTER — TELEPHONE (OUTPATIENT)
Dept: CARDIOLOGY | Facility: MEDICAL CENTER | Age: 53
End: 2018-11-28

## 2018-11-28 NOTE — TELEPHONE ENCOUNTER
Received fax from Brigitte from [facility] requesting records for patient oxygen coverage for medicare.  Called Brigitte ([phone]) to clarify patient as patient name is on facesheet conflicting with another patient name on the request.  Verified this patient for request.  Upon chart review, patient was ordered oxygen during last hospital stay.    Records printed along with last OV note from MD and faxed to Brigitte, [phone], completed status.  Fax sent to scanning for records.

## 2018-12-03 ENCOUNTER — OFFICE VISIT (OUTPATIENT)
Dept: INTERNAL MEDICINE | Facility: MEDICAL CENTER | Age: 53
End: 2018-12-03
Payer: MEDICARE

## 2018-12-03 ENCOUNTER — HOSPITAL ENCOUNTER (OUTPATIENT)
Dept: RADIOLOGY | Facility: MEDICAL CENTER | Age: 53
End: 2018-12-03
Attending: INTERNAL MEDICINE
Payer: MEDICARE

## 2018-12-03 VITALS
HEART RATE: 105 BPM | BODY MASS INDEX: 30.59 KG/M2 | HEIGHT: 64 IN | TEMPERATURE: 97 F | DIASTOLIC BLOOD PRESSURE: 118 MMHG | OXYGEN SATURATION: 99 % | SYSTOLIC BLOOD PRESSURE: 156 MMHG | WEIGHT: 179.2 LBS

## 2018-12-03 DIAGNOSIS — Z98.84 HX OF GASTRIC BYPASS: ICD-10-CM

## 2018-12-03 DIAGNOSIS — I50.30 DIASTOLIC CONGESTIVE HEART FAILURE, UNSPECIFIED HF CHRONICITY (HCC): ICD-10-CM

## 2018-12-03 DIAGNOSIS — K21.9 GASTROESOPHAGEAL REFLUX DISEASE WITHOUT ESOPHAGITIS: ICD-10-CM

## 2018-12-03 DIAGNOSIS — E87.6 HYPOKALEMIA: ICD-10-CM

## 2018-12-03 DIAGNOSIS — I10 ESSENTIAL HYPERTENSION: Chronic | ICD-10-CM

## 2018-12-03 DIAGNOSIS — M62.838 MUSCLE SPASM: ICD-10-CM

## 2018-12-03 DIAGNOSIS — I87.2 VENOUS INSUFFICIENCY OF LEFT LOWER EXTREMITY: ICD-10-CM

## 2018-12-03 DIAGNOSIS — E66.09 CLASS 1 OBESITY DUE TO EXCESS CALORIES WITHOUT SERIOUS COMORBIDITY IN ADULT, UNSPECIFIED BMI: ICD-10-CM

## 2018-12-03 DIAGNOSIS — I77.810 AORTIC ROOT DILATATION (HCC): ICD-10-CM

## 2018-12-03 PROBLEM — J18.9 CAP (COMMUNITY ACQUIRED PNEUMONIA): Status: RESOLVED | Noted: 2018-09-26 | Resolved: 2018-12-03

## 2018-12-03 PROBLEM — E66.9 CLASS 1 OBESITY IN ADULT: Status: ACTIVE | Noted: 2018-12-03

## 2018-12-03 PROCEDURE — 99214 OFFICE O/P EST MOD 30 MIN: CPT | Mod: GC | Performed by: INTERNAL MEDICINE

## 2018-12-03 PROCEDURE — 93971 EXTREMITY STUDY: CPT | Mod: 26 | Performed by: SURGERY

## 2018-12-03 PROCEDURE — 93971 EXTREMITY STUDY: CPT | Mod: LT

## 2018-12-03 RX ORDER — LEVOTHYROXINE SODIUM 300 UG/1
300 TABLET ORAL EVERY MORNING
Status: ON HOLD | COMMUNITY
Start: 2018-11-07 | End: 2019-02-07

## 2018-12-03 RX ORDER — LIOTHYRONINE SODIUM 5 UG/1
5 TABLET ORAL DAILY
COMMUNITY
Start: 2018-12-01 | End: 2019-02-13

## 2018-12-03 RX ORDER — FUROSEMIDE 20 MG/1
20 TABLET ORAL 2 TIMES DAILY
Qty: 180 TAB | Refills: 0 | Status: CANCELLED | OUTPATIENT
Start: 2018-12-03

## 2018-12-03 RX ORDER — TIZANIDINE 4 MG/1
TABLET ORAL
COMMUNITY
Start: 2018-11-06 | End: 2018-12-03 | Stop reason: SDUPTHER

## 2018-12-03 RX ORDER — LISINOPRIL 10 MG/1
10 TABLET ORAL DAILY
Qty: 90 TAB | Refills: 3 | Status: ON HOLD | OUTPATIENT
Start: 2018-12-03 | End: 2019-02-07

## 2018-12-03 RX ORDER — LEVOTHYROXINE SODIUM 75 MCG
75 TABLET ORAL
COMMUNITY
Start: 2018-11-26 | End: 2021-03-16

## 2018-12-03 RX ORDER — POTASSIUM CHLORIDE 20 MEQ/1
20 TABLET, EXTENDED RELEASE ORAL DAILY
Qty: 30 TAB | Refills: 1 | Status: SHIPPED | OUTPATIENT
Start: 2018-12-03 | End: 2020-06-01

## 2018-12-03 RX ORDER — TIZANIDINE 4 MG/1
4 TABLET ORAL 2 TIMES DAILY
Qty: 60 TAB | Refills: 2 | Status: ON HOLD | OUTPATIENT
Start: 2018-12-03 | End: 2019-02-19

## 2018-12-03 NOTE — PATIENT INSTRUCTIONS
Increased lisinopril to 10 mg daily    Increase to 20 mg after 1 week if blood pressure is higher than 140/90

## 2018-12-03 NOTE — PROGRESS NOTES
Established Patient    Donna presents today with the following:    CC:   Chief Complaint   Patient presents with   • Medication Refill     Lasix, Tizanidine   • Hypertension         HPI: This is 53-year-old male female with history of diastolic CHF, hypertension, fibromyalgia who is here for follow-up of hypertension and one refill of medications.  Patient's sees cardiology, who increased her Lasix to 20 mg twice daily, and she has upcoming appointment with cardiology in about 3 weeks.  Heart failure symptoms, including no shortness of breath, orthopnea, PND, or lower extremity swelling.  Has baseline potassium was 3.2, and patient on supplementation.  No symptoms of hypokalemia.  Patient was on oxygen after recent discharge from hospital for community-acquired pneumonia, currently off about 2 weeks ago.    Patient states her blood pressure has been running in the 160s-200s systolic, and 110-120 diastolic.  During these episodes she has redness in her face she states.  She is currently is on 5 mg lisinopril daily  The plan is to increase lisinopril dose to 10 mg daily, and then increase to 20 mg daily if blood pressures not controlled  Of note patient has history of muscle spasm.  She is on tizanidine for muscle spasm.  Patient wants refill of medication.  Currently denies side effect.    Also patient has history of symptoms and on PPI and H2 blockers.  She has a history of esophageal dilatation.  She thinks her symptoms are getting worse, because she sometimes feel food get stuck in her throat.  She will want referral to GI.  She currently denies any alarm symptoms    Patient Active Problem List    Diagnosis Date Noted   • Class 1 obesity in adult 12/03/2018   • Hypokalemia 12/03/2018   • Hx of gastric bypass in 2009 Decmber  12/03/2018   • Creatinine elevation 10/25/2018   • Hospital discharge follow-up 10/11/2018   • Gastroesophageal reflux disease without esophagitis 10/11/2018   • Dry mouth in setting  of trauma 2001 10/11/2018   • Muscle spasm 10/11/2018   • Fibromyalgia 10/11/2018   • Multiple allergies 10/11/2018   • Chronic migraine without aura, not intractable 10/11/2018   • CHF (congestive heart failure) ([facility]) 10/11/2018   • Right heart enlargement 10/11/2018   • Aortic root dilatation ([facility]) 10/11/2018   • Essential hypertension 09/26/2018       Current Outpatient Prescriptions   Medication Sig Dispense Refill   • liothyronine (CYTOMEL) 5 MCG Tab      • SYNTHROID 75 MCG Tab Take 300 mcg by mouth every bedtime.     • levothyroxine (SYNTHROID) 300 MCG Tab Take 300 mcg by mouth every morning.     • lisinopril (PRINIVIL) 10 MG Tab Take 1 Tab by mouth every day. 90 Tab 3   • tizanidine (ZANAFLEX) 4 MG Tab Take 1 Tab by mouth 2 times a day. 60 Tab 2   • potassium chloride SA (KDUR) 20 MEQ Tab CR Take 1 Tab by mouth every day. 30 Tab 1   • EPINEPHrine (EPIPEN) 0.3 MG/0.3ML Solution Auto-injector solution for injection 0.3 mg by Intramuscular route Once. Use as directed     • furosemide (LASIX) 20 MG Tab Take 1 Tab by mouth every day. (Patient taking differently: Take 20 mg by mouth 2 times a day.) 30 Tab 3   • pantoprazole (PROTONIX) 40 MG Tablet Delayed Response 1 tab qhs  2   • colestipol (COLESTID) 1 GM Tab TK 2 TS PO BID  1   • lisinopril (PRINIVIL) 5 MG Tab Take 1 Tab by mouth every day. 90 Tab 2   • raNITidine (ZANTAC) 150 MG Tab Take 2 Tabs by mouth 2 times a day. 60 Tab 3   • montelukast (SINGULAIR) 10 MG Tab Take 1 Tab by mouth every day. 90 Tab 0   • meloxicam (MOBIC) 15 MG tablet Take 1 Tab by mouth every day. 30 Tab 1   • gabapentin (NEURONTIN) 400 MG Cap Take 1 Cap by mouth 3 times a day. 180 Cap 3   • Frovatriptan Succinate (FROVA) 2.5 MG Tab Take 1 Tab by mouth as needed. 10 Tab 3   • DULoxetine (CYMBALTA) 60 MG Cap DR Particles delayed-release capsule Take 1 Cap by mouth every day. 90 Cap 3   • cevimeline (EVOXAC) 30 MG capsule Take 1 Cap by mouth 3 times a day. 90 Cap 3   • linezolid (ZYVOX) 600  "MG Tab Take 1 Tab by mouth every 12 hours. 20 Tab 0   • Coral Calcium 1000 (390 Ca) MG Tab Take 1 Tab by mouth every day.     • cetirizine (ZYRTEC) 10 MG Tab Take 10 mg by mouth every day.     • magnesium oxide (MAG-OX) 400 MG Tab Take 400 mg by mouth every day.     • Cyanocobalamin (VITAMIN B-12) 1000 MCG Tab Take 1,000 mcg by mouth every 48 hours.     • Biotin 5000 MCG Cap Take 1 Cap by mouth every day.     • Cholecalciferol (VITAMIN D) 2000 units Cap Take 1 Cap by mouth every day.     • Probiotic Product (PROBIOTIC PO) Take 1 Tab by mouth every day.     • Menaquinone-7 (VITAMIN K2) 100 MCG Cap Take 1 Tab by mouth every day.     • multivitamin (THERAGRAN) Tab Take 1 Tab by mouth every day.       No current facility-administered medications for this visit.        ROS: As per HPI.   All other systems reviewed and normal    /118 (BP Location: Right arm, Patient Position: Sitting, BP Cuff Size: Large adult long)   Pulse (!) 105   Temp 36.1 °C (97 °F)   Ht 1.626 m (5' 4\")   Wt 81.3 kg (179 lb 3.2 oz)   SpO2 99%   BMI 30.76 kg/m²     Physical Exam   Constitutional:  oriented to person, place, and time. No distress.   Eyes: Pupils are equal, round, and reactive to light. No scleral icterus.  Neck: Neck supple. No thyromegaly present.   Cardiovascular: Normal rate, regular rhythm and normal heart sounds.  Exam reveals no gallop and no friction rub.  No murmur heard.  Pulmonary/Chest: Breath sounds normal. Chest wall is not dull to percussion.   Abdomen soft nontender, bowel sounds normoactive  Musculoskeletal:   no edema.   Lymphadenopathy: no cervical adenopathy  Neurological: alert and oriented to person, place, and time.   Skin: No cyanosis. Nails show no clubbing.    Note: I have reviewed all pertinent labs and diagnostic tests associated with this visit with specific comments listed under the assessment and plan below    Assessment and Plan    1. Essential hypertension: Goal less than 120/80 with no " symptoms  Blood pressure mostly 160s-120 systolic recently, diastolic 110s 120s  -We will increase lisinopril to 10 mg daily.  Patient instructed to increase to 20 mg daily after 1 week if blood pressure is 140/90 and above  -Continue Lasix for heart failure, which also help in blood pressure management    2. Diastolic congestive heart failure, unspecified HF chronicity (HCC)  Patient follows with cardiology, currently has no heart failure symptoms  -Continue Lasix    3. Aortic root dilatation (HCC)  Last echo done 9/27/2018 showed aortic root dilatation of 3.6 cm  We will repeat echo in about 6-12 months for further evaluation    4. Gastroesophageal reflux disease without esophagitis  Currently on PPI, has history of esophageal stenosis status post dilatation  States symptoms are getting worse  -Referral to GI for possible EGD  -Continue PPI    5. Hypokalemia  Labs potassium 3.2, on 10/31/2218  Continue supplementation  - Repeat BMP    6. Muscle spasm  Pt has been on cymbalta, and gabapentin and states it helps  Will continue zanaflex for muscle spasm    7. Class 1 obesity due to excess calories without serious comorbidity in adult, unspecified BMI  8. Hx of gastric bypass: 2009 December  Patient educated on exercise, diet  Informed that if she is not able to lose weight and option is to referred for nutrition services    Followup: Return in about 3 months (around 3/3/2019).    Signed by: Benson Ramirez M.D.

## 2018-12-04 RX ORDER — FUROSEMIDE 20 MG/1
20 TABLET ORAL 2 TIMES DAILY
Qty: 60 TAB | Refills: 1 | Status: SHIPPED | OUTPATIENT
Start: 2018-12-04 | End: 2018-12-13 | Stop reason: SDUPTHER

## 2018-12-04 RX ORDER — POTASSIUM CHLORIDE 20 MEQ/1
20 TABLET, EXTENDED RELEASE ORAL DAILY
Qty: 30 TAB | Refills: 1 | Status: SHIPPED | OUTPATIENT
Start: 2018-12-04 | End: 2019-01-28

## 2018-12-04 NOTE — TELEPHONE ENCOUNTER
1. Caller Name: Pt                      Call Back Number: [phone] (home)     2. Message: Patient called stating she needs a refill on her furosemide 20 mg tabs as they had upped her to take twice a day instead of original once a day. She needs a new script to.    3. Patient approves office to leave a detailed voicemail/MyChart message: N\A

## 2018-12-04 NOTE — TELEPHONE ENCOUNTER
I refilled the lasix    I also added potassium.   Pt is due for had request for BMP yesterday.    Message sent to pt

## 2018-12-05 ENCOUNTER — TELEPHONE (OUTPATIENT)
Dept: CARDIOLOGY | Facility: MEDICAL CENTER | Age: 53
End: 2018-12-05

## 2018-12-06 NOTE — TELEPHONE ENCOUNTER
Received fax from Brigitte.  Recommended for her to follow up with patient's PCP as her cardiologist did not order oxygen for her.  She states no other concerns or questions at this time and is appreciative of information given.    Fax sent to scanning for reference.

## 2018-12-07 ENCOUNTER — TELEPHONE (OUTPATIENT)
Dept: CARDIOLOGY | Facility: MEDICAL CENTER | Age: 53
End: 2018-12-07

## 2018-12-07 ENCOUNTER — TELEPHONE (OUTPATIENT)
Dept: INTERNAL MEDICINE | Facility: MEDICAL CENTER | Age: 53
End: 2018-12-07

## 2018-12-07 NOTE — TELEPHONE ENCOUNTER
Pt came in a dropped of a paper from [place] [facility] where it states pt needs medical clearance for her surgery pt was just seen on 12/3/18 and would like to see if you could fill this paperwork out for her?

## 2018-12-08 NOTE — TELEPHONE ENCOUNTER
I called patient and she did states she was told today that she needs surgery    I informed patient the last visit was not pre-op assessment and she will need to be seen and assessed pre-op.  She is willing to call and make appointment

## 2018-12-08 NOTE — TELEPHONE ENCOUNTER
US-EXTREMITY VENOUS LOWER UNILAT LEFT  Order: [id number]   Status:  Final result   Visible to patient:  Yes (DialedINhart) Dx:  Venous insufficiency of left lower ex...  ERNESTO Zaidi RJelaniNJelani          Ultrasound showed no clot in the left leg but some incompetency of the valves in the vein around the groin   Cont Rx. May try compression stocking       Notified pt through DialedINhart.

## 2018-12-10 ENCOUNTER — HOSPITAL ENCOUNTER (OUTPATIENT)
Dept: LAB | Facility: MEDICAL CENTER | Age: 53
End: 2018-12-10
Attending: INTERNAL MEDICINE
Payer: MEDICARE

## 2018-12-10 DIAGNOSIS — I50.30 DIASTOLIC CONGESTIVE HEART FAILURE, UNSPECIFIED HF CHRONICITY (HCC): ICD-10-CM

## 2018-12-10 LAB
ANION GAP SERPL CALC-SCNC: 8 MMOL/L (ref 0–11.9)
BUN SERPL-MCNC: 29 MG/DL (ref 8–22)
CALCIUM SERPL-MCNC: 9.6 MG/DL (ref 8.5–10.5)
CHLORIDE SERPL-SCNC: 102 MMOL/L (ref 96–112)
CHOLEST SERPL-MCNC: 175 MG/DL (ref 100–199)
CO2 SERPL-SCNC: 27 MMOL/L (ref 20–33)
CREAT SERPL-MCNC: 1.67 MG/DL (ref 0.5–1.4)
FASTING STATUS PATIENT QL REPORTED: NORMAL
GLUCOSE SERPL-MCNC: 105 MG/DL (ref 65–99)
HDLC SERPL-MCNC: 98 MG/DL
LDLC SERPL CALC-MCNC: 50 MG/DL
POTASSIUM SERPL-SCNC: 4.4 MMOL/L (ref 3.6–5.5)
SODIUM SERPL-SCNC: 137 MMOL/L (ref 135–145)
TRIGL SERPL-MCNC: 134 MG/DL (ref 0–149)

## 2018-12-10 PROCEDURE — 36415 COLL VENOUS BLD VENIPUNCTURE: CPT

## 2018-12-10 PROCEDURE — 83970 ASSAY OF PARATHORMONE: CPT

## 2018-12-10 PROCEDURE — 84481 FREE ASSAY (FT-3): CPT

## 2018-12-10 PROCEDURE — 80061 LIPID PANEL: CPT

## 2018-12-10 PROCEDURE — 84443 ASSAY THYROID STIM HORMONE: CPT

## 2018-12-10 PROCEDURE — 84439 ASSAY OF FREE THYROXINE: CPT

## 2018-12-10 PROCEDURE — 80048 BASIC METABOLIC PNL TOTAL CA: CPT

## 2018-12-10 PROCEDURE — 82533 TOTAL CORTISOL: CPT

## 2018-12-10 PROCEDURE — 82542 COL CHROMOTOGRAPHY QUAL/QUAN: CPT

## 2018-12-11 LAB
CORTIS SERPL-MCNC: 11.3 UG/DL (ref 0–23)
PTH-INTACT SERPL-MCNC: 46.6 PG/ML (ref 14–72)
T3FREE SERPL-MCNC: 4.34 PG/ML (ref 2.4–4.2)
T4 FREE SERPL-MCNC: 1.41 NG/DL (ref 0.53–1.43)
TSH SERPL DL<=0.005 MIU/L-ACNC: 0.02 UIU/ML (ref 0.38–5.33)

## 2018-12-13 ENCOUNTER — TELEPHONE (OUTPATIENT)
Dept: CARDIOLOGY | Facility: MEDICAL CENTER | Age: 53
End: 2018-12-13

## 2018-12-13 DIAGNOSIS — I50.30 DIASTOLIC CONGESTIVE HEART FAILURE, UNSPECIFIED HF CHRONICITY (HCC): ICD-10-CM

## 2018-12-13 RX ORDER — FUROSEMIDE 20 MG/1
10 TABLET ORAL 2 TIMES DAILY
Qty: 90 TAB | Refills: 1 | Status: ON HOLD | OUTPATIENT
Start: 2018-12-13 | End: 2019-02-07

## 2018-12-13 NOTE — TELEPHONE ENCOUNTER
BASIC METABOLIC PANEL   Order: [id number]   Status:  Final result   Visible to patient:  Yes (MyChart) Dx:  Diastolic congestive heart failure, u... Adriana Carlson M.D.  Nori Hsieh, ALMAN.          ALLY+ normal   Should cutting back furosemide in half      Called patient, unable to reach.  Left voicemail with MD recommendations and to return this call if she has any questions or concerns.    Medication list updated.

## 2018-12-13 NOTE — LETTER
December 14, 2018        Donna Isaac  [address]  [place] [address]        Dear Donna:    We tried to call and left voicemail regarding Dr. Carlson's recommendations regarding your lab work.    Dr. Carlson reviewed your lab work and states your potassium is normal.  He is recommending to cut your Furosemide in half.  I sent an updated prescription to your preferred pharmacy at [facility] [address].    If you have any questions or concerns, please don't hesitate to call.        Sincerely,        Nori STAFFORD For Dr. Carlson  Electronically Signed

## 2018-12-14 NOTE — TELEPHONE ENCOUNTER
Attempted to call patient, unable to reach.  Left voicemail regarding MD recommendations.    Letter printed with MD's recommendations and sent to patient's mailing address.

## 2018-12-18 ENCOUNTER — TELEPHONE (OUTPATIENT)
Dept: CARDIOLOGY | Facility: MEDICAL CENTER | Age: 53
End: 2018-12-18

## 2018-12-18 ENCOUNTER — HOSPITAL ENCOUNTER (OUTPATIENT)
Dept: RADIOLOGY | Facility: MEDICAL CENTER | Age: 53
End: 2018-12-18
Attending: STUDENT IN AN ORGANIZED HEALTH CARE EDUCATION/TRAINING PROGRAM
Payer: MEDICARE

## 2018-12-18 DIAGNOSIS — Z12.31 BREAST CANCER SCREENING BY MAMMOGRAM: ICD-10-CM

## 2018-12-18 PROCEDURE — 77067 SCR MAMMO BI INCL CAD: CPT

## 2018-12-18 NOTE — TELEPHONE ENCOUNTER
Received clearance from Dulce Ambriz RN at [facility] (P: [phone] ext. [phone] F: [phone]) requesting Cardiac clearance for LTSA Inpingement & AC Arthritis B/T w/ 1 assist with Dr. Camila Elkins.    Request relayed to MD for advise.

## 2018-12-18 NOTE — LETTER
PROCEDURE/SURGERY CLEARANCE FORM      Encounter Date: 12/18/2018    Patient: Donna Isaac  YOB: 1965    CARDIOLOGIST:  Adriana Carlson MD   REFERRING DOCTOR:  Dr. Camila Elkins    The above patient is evaluated from a cadiovascular standpoint and patient must been seen in this office before she can proceed to have the following procedure/surgery: Left Shoulder Arthoscopy with General Anesthesia                                           Additional comments: Patient was seen by her PCP last week.  PCP ordered stress test and nephrology consult and wanted to postpone her procedure for now.  From our standpoint, patient should be seen in this office before she may proceed.  If more than a month from now, she would need to be seen sooner depending on how urgent the procedure needs to be done.                   MD Signature  Adriana Carlson MD

## 2018-12-19 ENCOUNTER — OFFICE VISIT (OUTPATIENT)
Dept: INTERNAL MEDICINE | Facility: MEDICAL CENTER | Age: 53
End: 2018-12-19
Payer: MEDICARE

## 2018-12-19 VITALS
HEIGHT: 64 IN | SYSTOLIC BLOOD PRESSURE: 127 MMHG | TEMPERATURE: 98 F | BODY MASS INDEX: 30.22 KG/M2 | OXYGEN SATURATION: 96 % | DIASTOLIC BLOOD PRESSURE: 102 MMHG | HEART RATE: 117 BPM | WEIGHT: 177 LBS

## 2018-12-19 DIAGNOSIS — I10 ESSENTIAL HYPERTENSION: Chronic | ICD-10-CM

## 2018-12-19 DIAGNOSIS — G89.29 OTHER CHRONIC PAIN: ICD-10-CM

## 2018-12-19 DIAGNOSIS — N18.30 CKD (CHRONIC KIDNEY DISEASE), STAGE III (HCC): ICD-10-CM

## 2018-12-19 DIAGNOSIS — R73.01 IMPAIRED FASTING GLUCOSE: ICD-10-CM

## 2018-12-19 DIAGNOSIS — R79.89 CREATININE ELEVATION: ICD-10-CM

## 2018-12-19 DIAGNOSIS — D64.9 ANEMIA, UNSPECIFIED TYPE: ICD-10-CM

## 2018-12-19 DIAGNOSIS — E87.6 HYPOKALEMIA: ICD-10-CM

## 2018-12-19 DIAGNOSIS — M79.7 FIBROMYALGIA: ICD-10-CM

## 2018-12-19 DIAGNOSIS — G43.709 CHRONIC MIGRAINE WITHOUT AURA WITHOUT STATUS MIGRAINOSUS, NOT INTRACTABLE: ICD-10-CM

## 2018-12-19 DIAGNOSIS — I50.30 DIASTOLIC CONGESTIVE HEART FAILURE, UNSPECIFIED HF CHRONICITY (HCC): ICD-10-CM

## 2018-12-19 LAB
APPEARANCE UR: CLEAR
BILIRUB UR STRIP-MCNC: NEGATIVE MG/DL
COLOR UR AUTO: YELLOW
GLUCOSE UR STRIP.AUTO-MCNC: NEGATIVE MG/DL
KETONES UR STRIP.AUTO-MCNC: NEGATIVE MG/DL
LEUKOCYTE ESTERASE UR QL STRIP.AUTO: NEGATIVE
NITRITE UR QL STRIP.AUTO: NEGATIVE
PH UR STRIP.AUTO: 5.5 [PH] (ref 5–8)
PROT UR QL STRIP: NEGATIVE MG/DL
RBC UR QL AUTO: NEGATIVE
SP GR UR STRIP.AUTO: 1.01
UROBILINOGEN UR STRIP-MCNC: 0.2 MG/DL

## 2018-12-19 PROCEDURE — 81002 URINALYSIS NONAUTO W/O SCOPE: CPT | Mod: GC | Performed by: INTERNAL MEDICINE

## 2018-12-19 PROCEDURE — 99214 OFFICE O/P EST MOD 30 MIN: CPT | Mod: GC | Performed by: INTERNAL MEDICINE

## 2018-12-19 RX ORDER — FERROUS SULFATE 325(65) MG
325 TABLET ORAL DAILY
COMMUNITY
End: 2019-10-30

## 2018-12-19 RX ORDER — ESTRADIOL 0.5 MG/1
0.25 TABLET ORAL DAILY
COMMUNITY
End: 2021-03-16

## 2018-12-19 ASSESSMENT — ENCOUNTER SYMPTOMS
POLYDIPSIA: 1
DEPRESSION: 0
SHORTNESS OF BREATH: 1
NECK PAIN: 1
SEIZURES: 0
NAUSEA: 0
COUGH: 1
BLURRED VISION: 0
VOMITING: 0
EYE DISCHARGE: 0
SINUS PAIN: 1
SORE THROAT: 0
PALPITATIONS: 0
LOSS OF CONSCIOUSNESS: 0
ABDOMINAL PAIN: 0
WEIGHT LOSS: 0
CHILLS: 1

## 2018-12-19 ASSESSMENT — PAIN SCALES - GENERAL: PAINLEVEL: 6=MODERATE PAIN

## 2018-12-20 NOTE — PATIENT INSTRUCTIONS
"-Surgery clearance.  Pending cardiac stress test and work up of chronic kidney disease  -Cardiac history:  Prior stress MI,  Please see your cardiologist of a cardiac stress test for surgery clearance  -Kidney disease:  Your creatinine is increasing.  Referral placed nephrology.  Low salt diet    DASH Eating Plan  DASH stands for \"Dietary Approaches to Stop Hypertension.\" The DASH eating plan is a healthy eating plan that has been shown to reduce high blood pressure (hypertension). Additional health benefits may include reducing the risk of type 2 diabetes mellitus, heart disease, and stroke. The DASH eating plan may also help with weight loss.  What do I need to know about the DASH eating plan?  For the DASH eating plan, you will follow these general guidelines:  · Choose foods with less than 150 milligrams of sodium per serving (as listed on the food label).  · Use salt-free seasonings or herbs instead of table salt or sea salt.  · Check with your health care provider or pharmacist before using salt substitutes.  · Eat lower-sodium products. These are often labeled as \"low-sodium\" or \"no salt added.\"  · Eat fresh foods. Avoid eating a lot of canned foods.  · Eat more vegetables, fruits, and low-fat dairy products.  · Choose whole grains. Look for the word \"whole\" as the first word in the ingredient list.  · Choose fish and skinless chicken or turkey more often than red meat. Limit fish, poultry, and meat to 6 oz (170 g) each day.  · Limit sweets, desserts, sugars, and sugary drinks.  · Choose heart-healthy fats.  · Eat more home-cooked food and less restaurant, buffet, and fast food.  · Limit fried foods.  · Do not gandhi foods. Cook foods using methods such as baking, boiling, grilling, and broiling instead.  · When eating at a restaurant, ask that your food be prepared with less salt, or no salt if possible.  What foods can I eat?  Seek help from a dietitian for individual calorie needs.  Grains   Whole grain or " whole wheat bread. Brown rice. Whole grain or whole wheat pasta. Quinoa, bulgur, and whole grain cereals. Low-sodium cereals. Corn or whole wheat flour tortillas. Whole grain cornbread. Whole grain crackers. Low-sodium crackers.  Vegetables   Fresh or frozen vegetables (raw, steamed, roasted, or grilled). Low-sodium or reduced-sodium tomato and vegetable juices. Low-sodium or reduced-sodium tomato sauce and paste. Low-sodium or reduced-sodium canned vegetables.  Fruits   All fresh, canned (in natural juice), or frozen fruits.  Meat and Other Protein Products   Ground beef (85% or leaner), grass-fed beef, or beef trimmed of fat. Skinless chicken or turkey. Ground chicken or turkey. Pork trimmed of fat. All fish and seafood. Eggs. Dried beans, peas, or lentils. Unsalted nuts and seeds. Unsalted canned beans.  Dairy   Low-fat dairy products, such as skim or 1% milk, 2% or reduced-fat cheeses, low-fat ricotta or cottage cheese, or plain low-fat yogurt. Low-sodium or reduced-sodium cheeses.  Fats and Oils   Tub margarines without trans fats. Light or reduced-fat mayonnaise and salad dressings (reduced sodium). Avocado. Safflower, olive, or canola oils. Natural peanut or almond butter.  Other   Unsalted popcorn and pretzels.  The items listed above may not be a complete list of recommended foods or beverages. Contact your dietitian for more options.   What foods are not recommended?  Grains   White bread. White pasta. White rice. Refined cornbread. Bagels and croissants. Crackers that contain trans fat.  Vegetables   Creamed or fried vegetables. Vegetables in a cheese sauce. Regular canned vegetables. Regular canned tomato sauce and paste. Regular tomato and vegetable juices.  Fruits   Canned fruit in light or heavy syrup. Fruit juice.  Meat and Other Protein Products   Fatty cuts of meat. Ribs, chicken wings, wild, sausage, bologna, salami, chitterlings, fatback, hot dogs, bratwurst, and packaged luncheon meats. Salted  nuts and seeds. Canned beans with salt.  Dairy   Whole or 2% milk, cream, half-and-half, and cream cheese. Whole-fat or sweetened yogurt. Full-fat cheeses or blue cheese. Nondairy creamers and whipped toppings. Processed cheese, cheese spreads, or cheese curds.  Condiments   Onion and garlic salt, seasoned salt, table salt, and sea salt. Canned and packaged gravies. Worcestershire sauce. Tartar sauce. Barbecue sauce. Teriyaki sauce. Soy sauce, including reduced sodium. Steak sauce. Fish sauce. Oyster sauce. Cocktail sauce. Horseradish. Ketchup and mustard. Meat flavorings and tenderizers. Bouillon cubes. Hot sauce. Tabasco sauce. Marinades. Taco seasonings. Relishes.  Fats and Oils   Butter, stick margarine, lard, shortening, ghee, and wild fat. Coconut, palm kernel, or palm oils. Regular salad dressings.  Other   Pickles and olives. Salted popcorn and pretzels.  The items listed above may not be a complete list of foods and beverages to avoid. Contact your dietitian for more information.   Where can I find more information?  National [employer], [employer] [place]: www.nhlbi.nih.gov/health/health-topics/topics/dash/  This information is not intended to replace advice given to you by your health care provider. Make sure you discuss any questions you have with your health care provider.  Document Released: 12/06/2012 Document Revised: 05/25/2017 Document Reviewed: 10/22/2014  [employer] Interactive Patient Education © 2017 [employer]      Chronic Kidney Disease, Adult  Chronic kidney disease (CKD) happens when the kidneys are damaged during a time of 3 or more months. The kidneys are two organs that do many important jobs in the body. These jobs include:  · Removing wastes and extra fluids from the blood.  · Making hormones that maintain the amount of fluid in your tissues and blood vessels.  · Making sure that the body has the right amount of fluids and chemicals.  Most of the time, this condition does not go  away, but it can usually be controlled. Steps must be taken to slow down the kidney damage or stop it from getting worse. Otherwise, the kidneys may stop working.  Follow these instructions at home:  · Follow your diet as told by your doctor. You may need to avoid alcohol, salty foods (sodium), and foods that are high in potassium, calcium, and protein.  · Take over-the-counter and prescription medicines only as told by your doctor. Do not take any new medicines unless your doctor says you can do that. These include vitamins and minerals.  ¨ Medicines and nutritional supplements can make kidney damage worse.  ¨ Your doctor may need to change how much medicine you take.  · Do not use any tobacco products. These include cigarettes, chewing tobacco, and e-cigarettes. If you need help quitting, ask your doctor.  · Keep all follow-up visits as told by your doctor. This is important.  · Check your blood pressure. Tell your doctor if there are changes to your blood pressure.  · Get to a healthy weight. Stay at that weight. If you need help with this, ask your doctor.  · Start or continue an exercise plan. Try to exercise at least 30 minutes a day, 5 days a week.  · Stay up-to-date with your shots (immunizations) as told by your doctor.  Contact a doctor if:  · Your symptoms get worse.  · You have new symptoms.  Get help right away if:  · You have symptoms of end-stage kidney disease. These include:  ¨ Headaches.  ¨ Skin that is darker or lighter than normal.  ¨ Numbness in your hands or feet.  ¨ Easy bruising.  ¨ Having hiccups often.  ¨ Chest pain.  ¨ Shortness of breath.  ¨ Stopping of menstrual periods in women.  · You have a fever.  · You are making very little pee (urine).  · You have pain or bleeding when you pee (urinate).  This information is not intended to replace advice given to you by your health care provider. Make sure you discuss any questions you have with your health care provider.  Document Released:  03/14/2011 Document Revised: 05/25/2017 Document Reviewed: 08/16/2013  [employer] Interactive Patient Education © 2017 [employer]
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
## 2018-12-21 PROBLEM — E83.51 HYPOCALCEMIA: Status: ACTIVE | Noted: 2018-03-26

## 2018-12-21 PROBLEM — M25.512 PAIN IN JOINT OF LEFT SHOULDER: Status: ACTIVE | Noted: 2018-03-26

## 2018-12-21 PROBLEM — J31.0 CHRONIC RHINITIS: Status: ACTIVE | Noted: 2018-07-03

## 2018-12-21 PROBLEM — L50.5 CHOLINERGIC URTICARIA: Status: ACTIVE | Noted: 2017-03-07

## 2018-12-21 PROBLEM — J45.991 COUGH VARIANT ASTHMA: Status: ACTIVE | Noted: 2017-03-21

## 2018-12-21 PROBLEM — A49.9 RECURRENT BACTERIAL INFECTION: Status: ACTIVE | Noted: 2017-03-07

## 2018-12-21 PROBLEM — S52.501A CLOSED FRACTURE OF DISTAL END OF RIGHT RADIUS: Status: ACTIVE | Noted: 2018-01-24

## 2018-12-21 PROBLEM — S62.101A CLOSED FRACTURE OF RIGHT WRIST: Status: ACTIVE | Noted: 2018-03-26

## 2018-12-21 PROBLEM — M81.0 OSTEOPOROSIS: Status: ACTIVE | Noted: 2017-02-22

## 2018-12-21 PROBLEM — T63.441A ANAPHYLACTIC REACTION TO BEE STING: Status: ACTIVE | Noted: 2017-03-07

## 2018-12-21 PROBLEM — R68.84 JAW PAIN: Status: ACTIVE | Noted: 2017-06-05

## 2018-12-21 PROBLEM — F32.A DEPRESSIVE DISORDER: Status: ACTIVE | Noted: 2018-01-16

## 2018-12-21 NOTE — TELEPHONE ENCOUNTER
Patient Calls   Adriana Carlson M.D.   You 2 days ago     Not sure exactly what those procedures are   If need to be done under general anesthesia then should wait a couple of months (Routing comment)      Called Dulce, [phone], to clarify procedure.  Dulce clarified procedure: Left Shoulder Arthoscopy with General Anesthesia, scheduled for 2 hrs.  She states that no hardware placement is planned.  She continue to explain they are looking to see what is pulling, and if reattachment is needed.  Scraping or grinding down of bones pressing on nerve.  She states that the procedure is not scheduled until clearance is given from cardiovascular standpoint.  Informed Dulce of MD recommendations.  Reassurance given that clarification will be relayed to MD.  She states no other concerns or questions at this time and is appreciative of information given.    Clarification of surgery relayed to MD.

## 2018-12-21 NOTE — TELEPHONE ENCOUNTER
Patient Calls   Adriana Carlson M.D.   You 3 hours ago (12:04 PM)     Look like she was seen by her PCP last week and her PCP ordered stress test and nephrology consult and wanted her to postpone her surgery for now   From our standpoint, should be seen before could be cleared   If more than a month from now, need to get her one depend on how urgent surgery need to be done (Routing comment)      Clearance letter with MD recommendations.  Fax sent to Dulce Ambriz [phone] with completed status.    Fax request sent to scanning for records.

## 2018-12-24 LAB — PTH RELATED PROT SERPL-SCNC: 2.6 PMOL/L (ref 0–3.4)

## 2018-12-26 ENCOUNTER — HOSPITAL ENCOUNTER (OUTPATIENT)
Dept: RADIOLOGY | Facility: MEDICAL CENTER | Age: 53
End: 2018-12-26

## 2019-01-08 ENCOUNTER — HOSPITAL ENCOUNTER (OUTPATIENT)
Dept: LAB | Facility: MEDICAL CENTER | Age: 54
End: 2019-01-08
Attending: STUDENT IN AN ORGANIZED HEALTH CARE EDUCATION/TRAINING PROGRAM
Payer: MEDICARE

## 2019-01-08 DIAGNOSIS — D64.9 ANEMIA, UNSPECIFIED TYPE: ICD-10-CM

## 2019-01-08 LAB
BASOPHILS # BLD AUTO: 0.5 % (ref 0–1.8)
BASOPHILS # BLD: 0.03 K/UL (ref 0–0.12)
EOSINOPHIL # BLD AUTO: 0.09 K/UL (ref 0–0.51)
EOSINOPHIL NFR BLD: 1.5 % (ref 0–6.9)
ERYTHROCYTE [DISTWIDTH] IN BLOOD BY AUTOMATED COUNT: 44.5 FL (ref 35.9–50)
HCT VFR BLD AUTO: 42.3 % (ref 37–47)
HGB BLD-MCNC: 13.6 G/DL (ref 12–16)
IMM GRANULOCYTES # BLD AUTO: 0.01 K/UL (ref 0–0.11)
IMM GRANULOCYTES NFR BLD AUTO: 0.2 % (ref 0–0.9)
LYMPHOCYTES # BLD AUTO: 1.49 K/UL (ref 1–4.8)
LYMPHOCYTES NFR BLD: 24.1 % (ref 22–41)
MCH RBC QN AUTO: 29.3 PG (ref 27–33)
MCHC RBC AUTO-ENTMCNC: 32.2 G/DL (ref 33.6–35)
MCV RBC AUTO: 91.2 FL (ref 81.4–97.8)
MONOCYTES # BLD AUTO: 0.7 K/UL (ref 0–0.85)
MONOCYTES NFR BLD AUTO: 11.3 % (ref 0–13.4)
NEUTROPHILS # BLD AUTO: 3.87 K/UL (ref 2–7.15)
NEUTROPHILS NFR BLD: 62.4 % (ref 44–72)
NRBC # BLD AUTO: 0 K/UL
NRBC BLD-RTO: 0 /100 WBC
PLATELET # BLD AUTO: 319 K/UL (ref 164–446)
PMV BLD AUTO: 11 FL (ref 9–12.9)
RBC # BLD AUTO: 4.64 M/UL (ref 4.2–5.4)
WBC # BLD AUTO: 6.2 K/UL (ref 4.8–10.8)

## 2019-01-08 PROCEDURE — 36415 COLL VENOUS BLD VENIPUNCTURE: CPT

## 2019-01-08 PROCEDURE — 85025 COMPLETE CBC W/AUTO DIFF WBC: CPT

## 2019-01-09 ENCOUNTER — APPOINTMENT (OUTPATIENT)
Dept: URGENT CARE | Facility: CLINIC | Age: 54
End: 2019-01-09
Payer: MEDICARE

## 2019-01-14 ENCOUNTER — HOSPITAL ENCOUNTER (OUTPATIENT)
Dept: LAB | Facility: MEDICAL CENTER | Age: 54
End: 2019-01-14
Attending: INTERNAL MEDICINE
Payer: MEDICARE

## 2019-01-14 LAB
ALBUMIN SERPL BCP-MCNC: 4.2 G/DL (ref 3.2–4.9)
ALBUMIN/GLOB SERPL: 1.4 G/DL
ALP SERPL-CCNC: 75 U/L (ref 30–99)
ALT SERPL-CCNC: 29 U/L (ref 2–50)
ANION GAP SERPL CALC-SCNC: 10 MMOL/L (ref 0–11.9)
AST SERPL-CCNC: 23 U/L (ref 12–45)
BILIRUB SERPL-MCNC: 0.5 MG/DL (ref 0.1–1.5)
BUN SERPL-MCNC: 12 MG/DL (ref 8–22)
CALCIUM SERPL-MCNC: 9.9 MG/DL (ref 8.5–10.5)
CHLORIDE SERPL-SCNC: 103 MMOL/L (ref 96–112)
CO2 SERPL-SCNC: 26 MMOL/L (ref 20–33)
CREAT SERPL-MCNC: 0.86 MG/DL (ref 0.5–1.4)
CREAT UR-MCNC: 13.5 MG/DL
CREAT UR-MCNC: 13.5 MG/DL
ESTRADIOL SERPL-MCNC: <20 PG/ML
GLOBULIN SER CALC-MCNC: 3 G/DL (ref 1.9–3.5)
GLUCOSE SERPL-MCNC: 114 MG/DL (ref 65–99)
MICROALBUMIN UR-MCNC: <0.7 MG/DL
MICROALBUMIN/CREAT UR: NORMAL MG/G (ref 0–30)
PHOSPHATE SERPL-MCNC: 3.7 MG/DL (ref 2.5–4.5)
POTASSIUM SERPL-SCNC: 3.7 MMOL/L (ref 3.6–5.5)
PROT SERPL-MCNC: 7.2 G/DL (ref 6–8.2)
SODIUM SERPL-SCNC: 139 MMOL/L (ref 135–145)

## 2019-01-14 PROCEDURE — 80053 COMPREHEN METABOLIC PANEL: CPT

## 2019-01-14 PROCEDURE — 82670 ASSAY OF TOTAL ESTRADIOL: CPT

## 2019-01-14 PROCEDURE — 83525 ASSAY OF INSULIN: CPT

## 2019-01-14 PROCEDURE — 84100 ASSAY OF PHOSPHORUS: CPT

## 2019-01-14 PROCEDURE — 82043 UR ALBUMIN QUANTITATIVE: CPT

## 2019-01-14 PROCEDURE — 36415 COLL VENOUS BLD VENIPUNCTURE: CPT

## 2019-01-14 PROCEDURE — 84105 ASSAY OF URINE PHOSPHORUS: CPT

## 2019-01-14 PROCEDURE — 82570 ASSAY OF URINE CREATININE: CPT

## 2019-01-14 PROCEDURE — 82340 ASSAY OF CALCIUM IN URINE: CPT

## 2019-01-16 LAB
CALCIUM 24H UR-MCNC: 6.5 MG/DL
CALCIUM 24H UR-MRATE: ABNORMAL MG/D
CALCIUM/CREAT 24H UR: 542 MG/G (ref 20–300)
COLLECT DURATION TIME SPEC: ABNORMAL HRS
COLLECT DURATION TIME SPEC: NORMAL HRS
CREAT 24H UR-MCNC: 12 MG/DL
CREAT 24H UR-MRATE: ABNORMAL MG/D (ref 500–1400)
INSULIN P FAST SERPL-ACNC: 14 UIU/ML (ref 3–19)
PHOSPHATE 24H UR-MCNC: 6 MG/DL
PHOSPHATE 24H UR-MRATE: NORMAL MG/D (ref 400–1300)
PHOSPHATE/CREAT 24H UR: 500 MG/G
SPECIMEN VOL ?TM UR: ABNORMAL ML
TOTAL VOLUME 1105: NORMAL ML

## 2019-01-22 ENCOUNTER — HOSPITAL ENCOUNTER (OUTPATIENT)
Dept: RADIOLOGY | Facility: MEDICAL CENTER | Age: 54
End: 2019-01-22
Attending: STUDENT IN AN ORGANIZED HEALTH CARE EDUCATION/TRAINING PROGRAM
Payer: MEDICARE

## 2019-01-22 ENCOUNTER — OFFICE VISIT (OUTPATIENT)
Dept: NEPHROLOGY | Facility: MEDICAL CENTER | Age: 54
End: 2019-01-22
Payer: MEDICARE

## 2019-01-22 VITALS
OXYGEN SATURATION: 100 % | HEART RATE: 110 BPM | HEIGHT: 64 IN | WEIGHT: 184 LBS | BODY MASS INDEX: 31.41 KG/M2 | DIASTOLIC BLOOD PRESSURE: 76 MMHG | RESPIRATION RATE: 14 BRPM | TEMPERATURE: 98.6 F | SYSTOLIC BLOOD PRESSURE: 110 MMHG

## 2019-01-22 DIAGNOSIS — N18.9 CHRONIC KIDNEY DISEASE, UNSPECIFIED CKD STAGE: ICD-10-CM

## 2019-01-22 DIAGNOSIS — Z91.89 AT RISK FOR CORONARY ARTERY DISEASE: ICD-10-CM

## 2019-01-22 DIAGNOSIS — I10 ESSENTIAL HYPERTENSION: Chronic | ICD-10-CM

## 2019-01-22 DIAGNOSIS — M19.90 OSTEOARTHRITIS, UNSPECIFIED OSTEOARTHRITIS TYPE, UNSPECIFIED SITE: ICD-10-CM

## 2019-01-22 DIAGNOSIS — R06.00 DYSPNEA, UNSPECIFIED TYPE: ICD-10-CM

## 2019-01-22 DIAGNOSIS — Z91.89 OTHER SPECIFIED PERSONAL RISK FACTORS, NOT ELSEWHERE CLASSIFIED: ICD-10-CM

## 2019-01-22 PROCEDURE — 93018 CV STRESS TEST I&R ONLY: CPT | Performed by: INTERNAL MEDICINE

## 2019-01-22 PROCEDURE — 700111 HCHG RX REV CODE 636 W/ 250 OVERRIDE (IP)

## 2019-01-22 PROCEDURE — A9502 TC99M TETROFOSMIN: HCPCS

## 2019-01-22 PROCEDURE — 99203 OFFICE O/P NEW LOW 30 MIN: CPT | Performed by: INTERNAL MEDICINE

## 2019-01-22 PROCEDURE — 78452 HT MUSCLE IMAGE SPECT MULT: CPT | Mod: 26 | Performed by: INTERNAL MEDICINE

## 2019-01-22 RX ORDER — REGADENOSON 0.08 MG/ML
INJECTION, SOLUTION INTRAVENOUS
Status: COMPLETED
Start: 2019-01-22 | End: 2019-01-22

## 2019-01-22 RX ORDER — CETIRIZINE HYDROCHLORIDE 10 MG/1
10 TABLET ORAL DAILY
COMMUNITY
End: 2019-01-28

## 2019-01-22 RX ADMIN — REGADENOSON 0.4 MG: 0.08 INJECTION, SOLUTION INTRAVENOUS at 10:13

## 2019-01-22 ASSESSMENT — ENCOUNTER SYMPTOMS
NAUSEA: 0
CHILLS: 0
HYPERTENSION: 1
FEVER: 0
VOMITING: 0
COUGH: 0
SHORTNESS OF BREATH: 0

## 2019-01-22 NOTE — PROGRESS NOTES
Patient presents to NM suite for cardiac stress test with MPI. Nursing goals identified: knowledge deficit, potential for anxiety r/t stress test, potential for compromised cardiac output. Care plan includes educating patient, reassurance and access to ACLS cart/team. Labs and ECG reviewed. Remarkable family cardiac history.  No caffeine and NPO confirmed. Resting images attained and patient prepped for pharmacological stress study. Lexiscan given while patient ambulated on TM x 2 mins. Patient reported these symptoms: NONE Caffeinated beverage provided.
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
## 2019-01-24 ENCOUNTER — HOSPITAL ENCOUNTER (OUTPATIENT)
Dept: LAB | Facility: MEDICAL CENTER | Age: 54
End: 2019-01-24
Attending: INTERNAL MEDICINE
Payer: MEDICARE

## 2019-01-24 DIAGNOSIS — N18.9 CHRONIC KIDNEY DISEASE, UNSPECIFIED CKD STAGE: ICD-10-CM

## 2019-01-24 DIAGNOSIS — M19.90 OSTEOARTHRITIS, UNSPECIFIED OSTEOARTHRITIS TYPE, UNSPECIFIED SITE: ICD-10-CM

## 2019-01-24 DIAGNOSIS — I10 ESSENTIAL HYPERTENSION: Chronic | ICD-10-CM

## 2019-01-24 LAB
ANION GAP SERPL CALC-SCNC: 8 MMOL/L (ref 0–11.9)
BUN SERPL-MCNC: 19 MG/DL (ref 8–22)
CALCIUM SERPL-MCNC: 9.3 MG/DL (ref 8.5–10.5)
CHLORIDE SERPL-SCNC: 107 MMOL/L (ref 96–112)
CO2 SERPL-SCNC: 20 MMOL/L (ref 20–33)
CREAT SERPL-MCNC: 1.08 MG/DL (ref 0.5–1.4)
GLUCOSE SERPL-MCNC: 151 MG/DL (ref 65–99)
POTASSIUM SERPL-SCNC: 3.7 MMOL/L (ref 3.6–5.5)
SODIUM SERPL-SCNC: 135 MMOL/L (ref 135–145)

## 2019-01-24 PROCEDURE — 80048 BASIC METABOLIC PNL TOTAL CA: CPT

## 2019-01-24 PROCEDURE — 36415 COLL VENOUS BLD VENIPUNCTURE: CPT

## 2019-01-25 ENCOUNTER — HOSPITAL ENCOUNTER (OUTPATIENT)
Facility: MEDICAL CENTER | Age: 54
End: 2019-01-25
Attending: INTERNAL MEDICINE
Payer: MEDICARE

## 2019-01-25 LAB
CREAT UR-MCNC: 57.9 MG/DL
MICROALBUMIN UR-MCNC: <0.7 MG/DL
MICROALBUMIN/CREAT UR: NORMAL MG/G (ref 0–30)

## 2019-01-25 PROCEDURE — 82570 ASSAY OF URINE CREATININE: CPT

## 2019-01-25 PROCEDURE — 82043 UR ALBUMIN QUANTITATIVE: CPT

## 2019-01-28 ENCOUNTER — HOSPITAL ENCOUNTER (EMERGENCY)
Facility: MEDICAL CENTER | Age: 54
End: 2019-01-28
Attending: EMERGENCY MEDICINE
Payer: MEDICARE

## 2019-01-28 ENCOUNTER — OFFICE VISIT (OUTPATIENT)
Dept: URGENT CARE | Facility: CLINIC | Age: 54
End: 2019-01-28
Payer: MEDICARE

## 2019-01-28 ENCOUNTER — OFFICE VISIT (OUTPATIENT)
Dept: INTERNAL MEDICINE | Facility: MEDICAL CENTER | Age: 54
End: 2019-01-28
Payer: MEDICARE

## 2019-01-28 ENCOUNTER — TELEPHONE (OUTPATIENT)
Dept: CARDIOLOGY | Facility: MEDICAL CENTER | Age: 54
End: 2019-01-28

## 2019-01-28 VITALS
OXYGEN SATURATION: 100 % | BODY MASS INDEX: 30.56 KG/M2 | HEART RATE: 96 BPM | DIASTOLIC BLOOD PRESSURE: 62 MMHG | SYSTOLIC BLOOD PRESSURE: 84 MMHG | TEMPERATURE: 97.3 F | WEIGHT: 179 LBS | HEIGHT: 64 IN

## 2019-01-28 VITALS
TEMPERATURE: 97.3 F | HEART RATE: 93 BPM | OXYGEN SATURATION: 98 % | WEIGHT: 181.66 LBS | SYSTOLIC BLOOD PRESSURE: 119 MMHG | DIASTOLIC BLOOD PRESSURE: 75 MMHG | RESPIRATION RATE: 18 BRPM | BODY MASS INDEX: 31.01 KG/M2 | HEIGHT: 64 IN

## 2019-01-28 VITALS
DIASTOLIC BLOOD PRESSURE: 70 MMHG | HEART RATE: 86 BPM | WEIGHT: 179 LBS | TEMPERATURE: 97 F | HEIGHT: 64 IN | BODY MASS INDEX: 30.56 KG/M2 | SYSTOLIC BLOOD PRESSURE: 106 MMHG

## 2019-01-28 DIAGNOSIS — Z01.818 PREOPERATIVE CLEARANCE: ICD-10-CM

## 2019-01-28 DIAGNOSIS — M17.11 PRIMARY OSTEOARTHRITIS OF RIGHT KNEE: ICD-10-CM

## 2019-01-28 DIAGNOSIS — T16.1XXA FOREIGN BODY OF RIGHT EAR, INITIAL ENCOUNTER: ICD-10-CM

## 2019-01-28 DIAGNOSIS — T16.1XXA EAR FOREIGN BODY, RIGHT, INITIAL ENCOUNTER: ICD-10-CM

## 2019-01-28 PROBLEM — E83.51 HYPOCALCEMIA: Status: RESOLVED | Noted: 2018-03-26 | Resolved: 2019-01-28

## 2019-01-28 PROBLEM — E87.6 HYPOKALEMIA: Status: RESOLVED | Noted: 2018-12-03 | Resolved: 2019-01-28

## 2019-01-28 PROCEDURE — 69200 CLEAR OUTER EAR CANAL: CPT

## 2019-01-28 PROCEDURE — 99214 OFFICE O/P EST MOD 30 MIN: CPT | Mod: GC | Performed by: INTERNAL MEDICINE

## 2019-01-28 PROCEDURE — 99283 EMERGENCY DEPT VISIT LOW MDM: CPT

## 2019-01-28 RX ORDER — FLUCONAZOLE 150 MG/1
150 TABLET ORAL DAILY
Qty: 1 TAB | Refills: 0 | Status: ON HOLD | OUTPATIENT
Start: 2019-01-28 | End: 2019-02-07

## 2019-01-28 NOTE — PROGRESS NOTES
Donna Isaac is a 53 y.o. female who presents today hoping we can remove some cotton in her right ear canal. She reports that she was using a Q-tip last night when much of the colon broke off and stayed in her right ear.  She says that she was seen at her PCPs office this morning but that they were unable to remove it.  She says she then went to call an ENT but was told that they were booked solid today and that she should come to urgent care first for us to reattempt removing it.  I looked in the ear canal and did see some cotton far back against the eardrum.  Her ear canals permanently perforated so did not want to irrigate with water.  Advised her that she needs to be seen by ENT for foreign body removal.

## 2019-01-28 NOTE — LETTER
[employer]  Benson Ramirez M.D.  [address]  [place] [address]  Fax: [phone]   Authorization for Release/Disclosure of   Protected Health Information   Name: CARMINA MORAN : 1965 SSN: [id number]   Address: [facility] [address]  [place] [address] Phone:    [phone] (home)    I authorize the entity listed below to release/disclose the PHI below to:   [employer]/Benson Ramirez M.D. and Radha Morales M.D.   Provider or Entity Name:                                           Alber Jeff MD - Endo     Address   City, [facility], [facility]   Phone:      Fax: [phone]     Reason for request: continuity of care   Information to be released:    [  ] LAST COLONOSCOPY,  including any PATH REPORT and follow-up  [  ] LAST FIT/COLOGUARD RESULT [  ] LAST DEXA  [  ] LAST MAMMOGRAM  [  ] LAST PAP  [  ] LAST LABS [  ] RETINA EXAM REPORT  [  ] IMMUNIZATION RECORDS  [X] Release all info      [  ] Check here and initial the line next to each item to release ALL health information INCLUDING  _____ Care and treatment for drug and / or alcohol abuse  _____ HIV testing, infection status, or AIDS  _____ Genetic Testing    DATES OF SERVICE OR TIME PERIOD TO BE DISCLOSED: _____________  I understand and acknowledge that:  * This Authorization may be revoked at any time by you in writing, except if your health information has already been used or disclosed.  * Your health information that will be used or disclosed as a result of you signing this authorization could be re-disclosed by the recipient. If this occurs, your re-disclosed health information may no longer be protected by State or Federal laws.  * You may refuse to sign this Authorization. Your refusal will not affect your ability to obtain treatment.  * This Authorization becomes effective upon signing and will  on (date) __________.      If no date is indicated, this Authorization will  one (1) year from the signature date.    Name:  Donna Isaac    Signature:   Date:     1/28/2019       PLEASE FAX REQUESTED RECORDS BACK TO: [phone]

## 2019-01-29 ENCOUNTER — HOSPITAL ENCOUNTER (OUTPATIENT)
Dept: LAB | Facility: MEDICAL CENTER | Age: 54
End: 2019-01-29
Attending: OTOLARYNGOLOGY
Payer: MEDICARE

## 2019-01-29 ENCOUNTER — TELEPHONE (OUTPATIENT)
Dept: INTERNAL MEDICINE | Facility: MEDICAL CENTER | Age: 54
End: 2019-01-29

## 2019-01-29 PROCEDURE — 87075 CULTR BACTERIA EXCEPT BLOOD: CPT

## 2019-01-29 PROCEDURE — 87070 CULTURE OTHR SPECIMN AEROBIC: CPT

## 2019-01-29 PROCEDURE — 87205 SMEAR GRAM STAIN: CPT

## 2019-01-29 PROCEDURE — 87077 CULTURE AEROBIC IDENTIFY: CPT

## 2019-01-29 PROCEDURE — 87186 SC STD MICRODIL/AGAR DIL: CPT

## 2019-01-29 NOTE — TELEPHONE ENCOUNTER
Clearance for procedure   Received: Today Message Contents   RIVERA Barba/Nori     Please call Luci at [facility] at [phone] extension [phone]. Patient is scheduled for Endoscopy on 2/19 and they need to get clearance.      Called Luci, unable to reach.  Phone call went straight to voicemail left message to return this call and to fax clearance request to [phone].    Will await for clearance to be received.

## 2019-01-29 NOTE — ED TRIAGE NOTES
"Chief Complaint   Patient presents with   • Ear Pain     Right   • Foreign Body in Ear     Right, used QTip in ear, states all the cotton remained in ear     /98   Pulse (!) 13   Temp 36.3 °C (97.4 °F) (Temporal)   Resp 18   Ht 1.626 m (5' 4\")   Wt 82.4 kg (181 lb 10.5 oz)   SpO2 98%   BMI 31.18 kg/m²     "

## 2019-01-29 NOTE — ED NOTES
Pt given written and oral discharge instructions. Pt verbalized understanding of all instructions given. All questions answered. VSS. Pt given f/u instructions and educated on s/s of when to return to the ER. Pt ambulating independently upon time of discharge in stable condition.

## 2019-01-29 NOTE — TELEPHONE ENCOUNTER
Faxed Dr. Morales's office visit not to [facility] for Medical Clearance that they have requested.

## 2019-01-29 NOTE — ED PROVIDER NOTES
ED Provider Note    CHIEF COMPLAINT  Chief Complaint   Patient presents with   • Ear Pain     Right   • Foreign Body in Ear     Right, used QTip in ear, states all the cotton remained in ear       HPI  Donna Isaac is a 53 y.o. female who presents complaining that she got a part of a Q-tip stuck in her right ear canal.  She states that it hurts and she went to the urgent care and they try to get it out but were unable to do so.  They did refer to Dr. Butler who she sees tomorrow but she states he is too uncomfortable to wait.  She does have some decreased hearing in her right ear.  She has a history of a perforated eardrum in that ear many years ago.  She denies any current runny nose cough or ear infection.      REVIEW OF SYSTEMS  See HPI for further details.  No coughing no rhinorrhea    PAST MEDICAL HISTORY  Past Medical History:   Diagnosis Date   • AAA (abdominal aortic aneurysm) ([facility])    • Asthma    • Chronic pain    • Congestive heart failure (HCC)    • Fibromyalgia    • GERD (gastroesophageal reflux disease)    • Hypertension    • Migraine    • Osteoporosis    • Urticaria        FAMILY HISTORY  Family History   Problem Relation Age of Onset   • Heart Disease Mother    • Diabetes Mother    • Heart Failure Mother    • Hypertension Mother    • Hypertension Father    • Heart Disease Brother    • Heart Attack Brother    • Hypertension Brother        SOCIAL HISTORY  Social History     Social History   • Marital status:      Spouse name: N/A   • Number of children: N/A   • Years of education: N/A     Social History Main Topics   • Smoking status: Never Smoker   • Smokeless tobacco: Never Used   • Alcohol use No      Comment: Denies   • Drug use: No   • Sexual activity: Not on file     Other Topics Concern   • Not on file     Social History Narrative   • No narrative on file       SURGICAL HISTORY  Past Surgical History:   Procedure Laterality Date   • APPENDECTOMY     • HYSTERECTOMY LAPAROSCOPY    "  • OTHER ABDOMINAL SURGERY         CURRENT MEDICATIONS  Home Medications    **Home medications have not yet been reviewed for this encounter**         ALLERGIES  Allergies   Allergen Reactions   • Bee Venom Anaphylaxis   • Contrast Media With Iodine [Iodine] Shortness of Breath   • Econazole Anaphylaxis   • Floxin [Ofloxacin] Anaphylaxis   • Keflex Shortness of Breath   • Levaquin Shortness of Breath   • Morphine Anaphylaxis   • Naloxone Hives     \"hives, SOB\"   • Nitrofurantoin Shortness of Breath     ...and hives     • Oxycodone Shortness of Breath     ...and hives     • Penicillins Shortness of Breath     ...and hives     • Ancef [Cefazolin] Hives   • Bactrim [Sulfamethoxazole W-Trimethoprim] Shortness of Breath   • Bextra [Valdecoxib] Rash     \"Skin burn and peeling.\"   • Other Drug Hives     \"Enconazole nitrate.\" shortness of breath and hives   • Other Misc Unspecified     Adhesive tape: blisters, skin peels off   • Clindamycin      HIVES     • Norco [Apap-Fd&C Yellow #10 Al Tai-Hydrocodone] Shortness of Breath     ...and hives     • Tygacil [Tigecycline]      itching       PHYSICAL EXAM  VITAL SIGNS: /75   Pulse 93   Temp 36.3 °C (97.4 °F) (Temporal)   Resp 18   Ht 1.626 m (5' 4\")   Wt 82.4 kg (181 lb 10.5 oz)   SpO2 98%   BMI 31.18 kg/m²   Constitutional: Well developed, Well nourished, No acute distress, Non-toxic appearance.   HEENT: Normocephalic, Atraumatic, the patient has a small piece of Q-tip stuck against her right TM, pharynx pink,  Mucous  Membranes moist, No rhinorrhea or mucosal edema  Eyes: PERRL, EOMI, Conjunctiva normal, No discharge.   Neck: Normal range of motion, No tenderness, Supple, No stridor.   Lymphatic: No lymphadenopathy    Cardiovascular: Regular Rate and Rhythm, No murmurs,  rubs, or gallops.   Thorax & Lungs: Lungs clear o auscultation bilaterally, No respiratoryt distress, No wheezes, rhales or rhonchi, No chest wall tenderness.    Skin: Warm, Dry, No erythema, No " rash,       COURSE & MEDICAL DECISION MAKING  Pertinent Labs & Imaging studies reviewed. (See chart for details)    I attempted to remove the foreign body with hemostats but was unable to reach it since it was so close to the eardrum.  I then took a saline bullet with an 18-gauge Angiocath on it and placed it in her ear.  I was able to flush out the piece of Q-tip and the patient felt much improved.  I will discharge her home and advised her to keep her appointment with Dr. Butler for recheck of her eardrum tomorrow.  She is advised not to use Q-tips in her ear.          FINAL IMPRESSION  1. Foreign body of right ear, initial encounter           PLAN/DISPOSITION  Discharged in improved condition          Electronically signed by: Joceline Le, 1/28/2019 6:16 PM

## 2019-01-29 NOTE — ED TRIAGE NOTES
"Chief Complaint   Patient presents with   • Ear Pain     Right   • Foreign Body in Ear     Right, used QTip in ear, states all the cotton remained in ear     /98   Pulse (!) 103   Temp 36.3 °C (97.4 °F) (Temporal)   Resp 18   Ht 1.626 m (5' 4\")   Wt 82.4 kg (181 lb 10.5 oz)   SpO2 98%   BMI 31.18 kg/m²     "
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
## 2019-01-30 ENCOUNTER — TELEPHONE (OUTPATIENT)
Dept: CARDIOLOGY | Facility: MEDICAL CENTER | Age: 54
End: 2019-01-30

## 2019-01-30 LAB
AMBIGUOUS DTTM AMBI4: NORMAL
SIGNIFICANT IND 70042: NORMAL
SITE SITE: NORMAL
SOURCE SOURCE: NORMAL

## 2019-01-30 NOTE — TELEPHONE ENCOUNTER
Received 2nd request from Dulce Ambriz RN (P: [phone] F:[phone]) at [facility] requesting clearance for Left Shoulder Arthoscopy with General Anesthesia.    Called patient, unable to reach.  Left voicemail to return this call at her earliest convenience.

## 2019-01-30 NOTE — TELEPHONE ENCOUNTER
Patient returning call   Received: Today Message Contents   RIVERA Barba/Nori     Patient is returning your call and can be reached at [phone].      Returned patient call.  Reviewed MD recommendations per MD regarding clearance.  Upon chart review, patient is scheduled to be seen 03/13/2019.  She is requesting to be seen sooner than that time as she would like to have surgery done as soon as possible.  Offered pt to be seen tomorrow at 1100 with MD.  She verbalizes understanding and is appreciative of information given.  Reassurance given that task will be sent to schedulers to schedule appointment.     Tasks deferred to schedulers.

## 2019-01-30 NOTE — LETTER
PROCEDURE/SURGERY CLEARANCE FORM      Encounter Date: 1/30/2019    Patient: Donna Isaac  YOB: 1965    CARDIOLOGIST:  Adriana Carlson MD   REFERRING DOCTOR:  [facility] - Dr. Camila Elkins     The above patient is may proceed at moderate but acceptable risk stratification to have the following procedure/surgery: LT SA  Inpingement & AC Arthritis B/T W/ 1st Assist                                           Additional comments:  It is recommended that procedure should be performed in the hospital setting to allow closer monitoring.                   MD Signature  Adriana Carlson MD

## 2019-01-31 ENCOUNTER — OFFICE VISIT (OUTPATIENT)
Dept: CARDIOLOGY | Facility: MEDICAL CENTER | Age: 54
End: 2019-01-31
Payer: MEDICARE

## 2019-01-31 ENCOUNTER — TELEPHONE (OUTPATIENT)
Dept: CARDIOLOGY | Facility: MEDICAL CENTER | Age: 54
End: 2019-01-31

## 2019-01-31 VITALS — SYSTOLIC BLOOD PRESSURE: 110 MMHG | DIASTOLIC BLOOD PRESSURE: 80 MMHG | HEART RATE: 108 BPM

## 2019-01-31 DIAGNOSIS — I51.7 RIGHT HEART ENLARGEMENT: ICD-10-CM

## 2019-01-31 DIAGNOSIS — I71.21 ASCENDING AORTIC ANEURYSM (HCC): ICD-10-CM

## 2019-01-31 DIAGNOSIS — Z01.810 PRE-OPERATIVE CARDIOVASCULAR EXAMINATION: ICD-10-CM

## 2019-01-31 LAB
EKG IMPRESSION: NORMAL
GRAM STN SPEC: NORMAL
SIGNIFICANT IND 70042: NORMAL
SITE SITE: NORMAL
SOURCE SOURCE: NORMAL

## 2019-01-31 PROCEDURE — 99214 OFFICE O/P EST MOD 30 MIN: CPT | Performed by: INTERNAL MEDICINE

## 2019-01-31 PROCEDURE — 93000 ELECTROCARDIOGRAM COMPLETE: CPT | Performed by: INTERNAL MEDICINE

## 2019-01-31 RX ORDER — LEVOTHYROXINE SODIUM 0.03 MG/1
25 TABLET ORAL
COMMUNITY
End: 2019-02-13

## 2019-01-31 ASSESSMENT — ENCOUNTER SYMPTOMS
HEADACHES: 1
SHORTNESS OF BREATH: 0
PALPITATIONS: 1
DIZZINESS: 0
DIARRHEA: 0
BLOOD IN STOOL: 0
WEIGHT LOSS: 0
INSOMNIA: 0
BLURRED VISION: 0

## 2019-01-31 NOTE — LETTER
[facility]   [address] [place] [address] [address]  [place] [address]  Phone: [phone]  Fax: [phone]              Donna Isaac  1965    Encounter Date: 1/31/2019    Adriana Carlson M.D.          PROGRESS NOTE:  Chief Complaint   Patient presents with   • Heart failure, pulmonary hypertension     pre op shoulder with OWEN and EGD esophageal dilation   HTN    Subjective:   Donna Isaac is a 53 y.o. female who presents today for above issues    She is scheduled to undergo esophageal dilation in shoulder surgery.  She denies any shortness of breath, orthopnea, PND or chest pain .  She also denies any palpitations, dizziness syncope.  She was on lisinopril 5 mg/d at her visit here in November.  Lisinopril was increased to 10 mg/d then BID a month ago by her primary care provider.  Lately BP at home according to her has mostly been 100-110 with  or so.  She was told to reduce lisinopril to 10 mg/d a few days ago.    Has been on thyroid supplement probably since October.  Most recent thyroid function test was from December 10 TSH was low at 0.02 with elevated free T3 of 4.34.  He has been no recent changes with thyroid medication dose.    Past Medical History:   Diagnosis Date   • AAA (abdominal aortic aneurysm) (HCC)    • Asthma    • Chronic pain    • Congestive heart failure (HCC)    • Fibromyalgia    • GERD (gastroesophageal reflux disease)    • Hypertension    • Migraine    • Osteoporosis    • Urticaria      Past Surgical History:   Procedure Laterality Date   • APPENDECTOMY     • HYSTERECTOMY LAPAROSCOPY     • OTHER ABDOMINAL SURGERY       Family History   Problem Relation Age of Onset   • Heart Disease Mother    • Diabetes Mother    • Heart Failure Mother    • Hypertension Mother    • Hypertension Father    • Heart Disease Brother    • Heart Attack Brother    • Hypertension Brother      Social History     Social History   • Marital status:  "     Spouse name: N/A   • Number of children: N/A   • Years of education: N/A     Occupational History   • Not on file.     Social History Main Topics   • Smoking status: Never Smoker   • Smokeless tobacco: Never Used   • Alcohol use No      Comment: Denies   • Drug use: No   • Sexual activity: Not on file     Other Topics Concern   • Not on file     Social History Narrative   • No narrative on file     Allergies   Allergen Reactions   • Bee Venom Anaphylaxis   • Contrast Media With Iodine [Iodine] Shortness of Breath   • Econazole Anaphylaxis   • Floxin [Ofloxacin] Anaphylaxis   • Keflex Shortness of Breath   • Levaquin Shortness of Breath   • Morphine Anaphylaxis   • Naloxone Hives     \"hives, SOB\"   • Nitrofurantoin Shortness of Breath     ...and hives     • Oxycodone Shortness of Breath     ...and hives     • Penicillins Shortness of Breath     ...and hives     • Ancef [Cefazolin] Hives   • Bactrim [Sulfamethoxazole W-Trimethoprim] Shortness of Breath   • Bextra [Valdecoxib] Rash     \"Skin burn and peeling.\"   • Other Drug Hives     \"Enconazole nitrate.\" shortness of breath and hives   • Other Misc Unspecified     Adhesive tape: blisters, skin peels off   • Clindamycin      HIVES     • Norco [Apap-Fd&C Yellow #10 Al Tai-Hydrocodone] Shortness of Breath     ...and hives     • Tygacil [Tigecycline]      itching     Outpatient Encounter Prescriptions as of 1/31/2019   Medication Sig Dispense Refill   • levothyroxine (SYNTHROID) 25 MCG Tab Take 25 mcg by mouth Every morning on an empty stomach.     • montelukast (SINGULAIR) 10 MG Tab TAKE 1 TABLET BY MOUTH EVERY DAY 90 Tab 3   • raNITidine (ZANTAC) 150 MG Tab TAKE 2 TABLETS BY MOUTH TWICE A DAY (Patient taking differently: TAKE 4 TABLETS BY MOUTH TWICE A DAY) 180 Tab 3   • meloxicam (MOBIC) 15 MG tablet TAKE 1 TABLET BY MOUTH EVERY DAY (Patient taking differently: TAKE 1 TABLET BY MOUTH EVERY DAY, taking 2x day) 60 Tab 0   • estradiol (ESTRACE) 0.5 MG tablet " Take 0.5 mg by mouth every day.     • furosemide (LASIX) 20 MG Tab Take 0.5 Tabs by mouth 2 times a day. 90 Tab 1   • liothyronine (CYTOMEL) 5 MCG Tab      • lisinopril (PRINIVIL) 10 MG Tab Take 1 Tab by mouth every day. (Patient taking differently: Take 10 mg by mouth every day.) 90 Tab 3   • tizanidine (ZANAFLEX) 4 MG Tab Take 1 Tab by mouth 2 times a day. 60 Tab 2   • potassium chloride SA (KDUR) 20 MEQ Tab CR Take 1 Tab by mouth every day. 30 Tab 1   • EPINEPHrine (EPIPEN) 0.3 MG/0.3ML Solution Auto-injector solution for injection 0.5 mg by Intramuscular route Once. Use as directed      • furosemide (LASIX) 20 MG Tab Take 1 Tab by mouth every day. 30 Tab 3   • pantoprazole (PROTONIX) 40 MG Tablet Delayed Response 1 tab twice a day  2   • gabapentin (NEURONTIN) 400 MG Cap Take 1 Cap by mouth 3 times a day. 180 Cap 3   • DULoxetine (CYMBALTA) 60 MG Cap DR Particles delayed-release capsule Take 1 Cap by mouth every day. (Patient taking differently: Take 60 mg by mouth 2 times a day.) 90 Cap 3   • cevimeline (EVOXAC) 30 MG capsule Take 1 Cap by mouth 3 times a day. 90 Cap 3   • Coral Calcium 1000 (390 Ca) MG Tab Take 1 Tab by mouth every day.     • magnesium oxide (MAG-OX) 400 MG Tab Take 400 mg by mouth every day.     • Cyanocobalamin (VITAMIN B-12) 1000 MCG Tab Take 1,000 mcg by mouth every 48 hours.     • Biotin 5000 MCG Cap Take 1 Cap by mouth every day.     • Cholecalciferol (VITAMIN D) 2000 units Cap Take 1 Cap by mouth every day.     • Probiotic Product (PROBIOTIC PO) Take 1 Tab by mouth every day.     • Menaquinone-7 (VITAMIN K2) 100 MCG Cap Take 1 Tab by mouth every day.     • fluconazole (DIFLUCAN) 150 MG tablet Take 1 Tab by mouth every day. (Patient not taking: Reported on 1/31/2019) 1 Tab 0   • ferrous sulfate 325 (65 Fe) MG tablet Take 27 mg by mouth every day.     • SYNTHROID 75 MCG Tab Take 300 mcg by mouth every bedtime.     • levothyroxine (SYNTHROID) 300 MCG Tab Take 300 mcg by mouth every  morning.     • colestipol (COLESTID) 1 GM Tab TK 2 TS PO BID  1   • Frovatriptan Succinate (FROVA) 2.5 MG Tab Take 1 Tab by mouth as needed. 10 Tab 3   • cetirizine (ZYRTEC) 10 MG Tab Take 10 mg by mouth every day.     • multivitamin (THERAGRAN) Tab Take 1 Tab by mouth every day.       No facility-administered encounter medications on file as of 1/31/2019.      Review of Systems   Constitutional: Negative for weight loss.   HENT: Negative for nosebleeds.    Eyes: Negative for blurred vision.   Respiratory: Negative for shortness of breath.    Cardiovascular: Positive for palpitations and leg swelling.        Left leg, had DVT after her hip Fx many years ago  -110/60 and  or so at home   Gastrointestinal: Negative for blood in stool and diarrhea.        Reflux, sleeping on 4 pillows  Also with dysphagia   Genitourinary: Negative for hematuria.   Musculoskeletal: Positive for joint pain.        Left shoulder pain, chronic but worse since May   Neurological: Positive for headaches. Negative for dizziness.        Having migraine   Psychiatric/Behavioral: The patient does not have insomnia.         Objective:   /80 (BP Location: Left arm)   Pulse (!) 108     Physical Exam   Constitutional: She is oriented to person, place, and time. No distress.   HENT:   Head: Normocephalic and atraumatic.   Eyes: EOM are normal.   Neck: No JVD present. No thyromegaly present.   Cardiovascular: Regular rhythm.  Exam reveals no gallop.    No murmur heard.  Pulmonary/Chest: Effort normal. She has no wheezes. She has no rales.   Abdominal: Soft. She exhibits no distension.   Musculoskeletal: She exhibits edema. She exhibits no deformity.   Trace Left leg   Neurological: She is alert and oriented to person, place, and time.   Skin: Skin is warm. No erythema.   Psychiatric: She has a normal mood and affect. Her behavior is normal.     EKG today by my review shows sinus tachycardia with nonspecific diffuse ST changes  compared to prior echocardiogram in September 26 heart rate has increased nonspecific ST changes are new    Stress myocardial perfusion imaging January 22 was negative for ischemia or infarct    Assessment:     1. Pre-operative cardiovascular examination  EKG   2. Right heart enlargement     3. Ascending aortic aneurysm (HCC)     4.      Sinus tachycardia    Medical Decision Making:  Today's Assessment / Status / Plan:     Symptomatically she is doing relatively well from cardiac standpoint.  Her heart rate is slightly fast today.  There is probably in part from her thyroid issue.  I advised her to contact her endocrinologist for consideration of adjusting her thyroid supplementation dose.  Her echocardiography in September showed normal left and a systolic function.  Her stress myocardial perfusion imaging earlier this month is reassuring.  I believe that she is at acceptable (moderate) risk to proceed with above procedures.  I however few that they should be performed in the hospital setting to allow closer monitoring.  Please feel free to contact me for any further questions.    In terms of her hypertension, she was advised to closely keep follow-up her blood pressure. Some of her labile blood pressures is probably in part from chronic pain and mental stress. She is to return for follow-up in the spring as previously scheduled.  Thank you for allowing us to participate in the care of this patient.      No Recipients

## 2019-01-31 NOTE — TELEPHONE ENCOUNTER
Received fax from [facility] (P:[phone] F: [phone]) requesting EGD.    Fax completed per MD recommendations.    Fax sent to [facility], [phone], completed status.    Fax sent to scanning for reference.

## 2019-01-31 NOTE — PROGRESS NOTES
Chief Complaint   Patient presents with   • Heart failure, pulmonary hypertension     pre op shoulder with OWEN and EGD esophageal dilation   HTN    Subjective:   Donna Isaac is a 53 y.o. female who presents today for above issues    She is scheduled to undergo esophageal dilation in shoulder surgery.  She denies any shortness of breath, orthopnea, PND or chest pain .  She also denies any palpitations, dizziness syncope.  She was on lisinopril 5 mg/d at her visit here in November.  Lisinopril was increased to 10 mg/d then BID a month ago by her primary care provider.  Lately BP at home according to her has mostly been 100-110 with  or so.  She was told to reduce lisinopril to 10 mg/d a few days ago.    Has been on thyroid supplement probably since October.  Most recent thyroid function test was from December 10 TSH was low at 0.02 with elevated free T3 of 4.34.  He has been no recent changes with thyroid medication dose.    Past Medical History:   Diagnosis Date   • AAA (abdominal aortic aneurysm) (HCC)    • Asthma    • Chronic pain    • Congestive heart failure (HCC)    • Fibromyalgia    • GERD (gastroesophageal reflux disease)    • Hypertension    • Migraine    • Osteoporosis    • Urticaria      Past Surgical History:   Procedure Laterality Date   • APPENDECTOMY     • HYSTERECTOMY LAPAROSCOPY     • OTHER ABDOMINAL SURGERY       Family History   Problem Relation Age of Onset   • Heart Disease Mother    • Diabetes Mother    • Heart Failure Mother    • Hypertension Mother    • Hypertension Father    • Heart Disease Brother    • Heart Attack Brother    • Hypertension Brother      Social History     Social History   • Marital status:      Spouse name: N/A   • Number of children: N/A   • Years of education: N/A     Occupational History   • Not on file.     Social History Main Topics   • Smoking status: Never Smoker   • Smokeless tobacco: Never Used   • Alcohol use No      Comment: Denies   • Drug  "use: No   • Sexual activity: Not on file     Other Topics Concern   • Not on file     Social History Narrative   • No narrative on file     Allergies   Allergen Reactions   • Bee Venom Anaphylaxis   • Contrast Media With Iodine [Iodine] Shortness of Breath   • Econazole Anaphylaxis   • Floxin [Ofloxacin] Anaphylaxis   • Keflex Shortness of Breath   • Levaquin Shortness of Breath   • Morphine Anaphylaxis   • Naloxone Hives     \"hives, SOB\"   • Nitrofurantoin Shortness of Breath     ...and hives     • Oxycodone Shortness of Breath     ...and hives     • Penicillins Shortness of Breath     ...and hives     • Ancef [Cefazolin] Hives   • Bactrim [Sulfamethoxazole W-Trimethoprim] Shortness of Breath   • Bextra [Valdecoxib] Rash     \"Skin burn and peeling.\"   • Other Drug Hives     \"Enconazole nitrate.\" shortness of breath and hives   • Other Misc Unspecified     Adhesive tape: blisters, skin peels off   • Clindamycin      HIVES     • Norco [Apap-Fd&C Yellow #10 Al Tai-Hydrocodone] Shortness of Breath     ...and hives     • Tygacil [Tigecycline]      itching     Outpatient Encounter Prescriptions as of 1/31/2019   Medication Sig Dispense Refill   • levothyroxine (SYNTHROID) 25 MCG Tab Take 25 mcg by mouth Every morning on an empty stomach.     • montelukast (SINGULAIR) 10 MG Tab TAKE 1 TABLET BY MOUTH EVERY DAY 90 Tab 3   • raNITidine (ZANTAC) 150 MG Tab TAKE 2 TABLETS BY MOUTH TWICE A DAY (Patient taking differently: TAKE 4 TABLETS BY MOUTH TWICE A DAY) 180 Tab 3   • meloxicam (MOBIC) 15 MG tablet TAKE 1 TABLET BY MOUTH EVERY DAY (Patient taking differently: TAKE 1 TABLET BY MOUTH EVERY DAY, taking 2x day) 60 Tab 0   • estradiol (ESTRACE) 0.5 MG tablet Take 0.5 mg by mouth every day.     • furosemide (LASIX) 20 MG Tab Take 0.5 Tabs by mouth 2 times a day. 90 Tab 1   • liothyronine (CYTOMEL) 5 MCG Tab      • lisinopril (PRINIVIL) 10 MG Tab Take 1 Tab by mouth every day. (Patient taking differently: Take 10 mg by mouth " every day.) 90 Tab 3   • tizanidine (ZANAFLEX) 4 MG Tab Take 1 Tab by mouth 2 times a day. 60 Tab 2   • potassium chloride SA (KDUR) 20 MEQ Tab CR Take 1 Tab by mouth every day. 30 Tab 1   • EPINEPHrine (EPIPEN) 0.3 MG/0.3ML Solution Auto-injector solution for injection 0.5 mg by Intramuscular route Once. Use as directed      • furosemide (LASIX) 20 MG Tab Take 1 Tab by mouth every day. 30 Tab 3   • pantoprazole (PROTONIX) 40 MG Tablet Delayed Response 1 tab twice a day  2   • gabapentin (NEURONTIN) 400 MG Cap Take 1 Cap by mouth 3 times a day. 180 Cap 3   • DULoxetine (CYMBALTA) 60 MG Cap DR Particles delayed-release capsule Take 1 Cap by mouth every day. (Patient taking differently: Take 60 mg by mouth 2 times a day.) 90 Cap 3   • cevimeline (EVOXAC) 30 MG capsule Take 1 Cap by mouth 3 times a day. 90 Cap 3   • Coral Calcium 1000 (390 Ca) MG Tab Take 1 Tab by mouth every day.     • magnesium oxide (MAG-OX) 400 MG Tab Take 400 mg by mouth every day.     • Cyanocobalamin (VITAMIN B-12) 1000 MCG Tab Take 1,000 mcg by mouth every 48 hours.     • Biotin 5000 MCG Cap Take 1 Cap by mouth every day.     • Cholecalciferol (VITAMIN D) 2000 units Cap Take 1 Cap by mouth every day.     • Probiotic Product (PROBIOTIC PO) Take 1 Tab by mouth every day.     • Menaquinone-7 (VITAMIN K2) 100 MCG Cap Take 1 Tab by mouth every day.     • fluconazole (DIFLUCAN) 150 MG tablet Take 1 Tab by mouth every day. (Patient not taking: Reported on 1/31/2019) 1 Tab 0   • ferrous sulfate 325 (65 Fe) MG tablet Take 27 mg by mouth every day.     • SYNTHROID 75 MCG Tab Take 300 mcg by mouth every bedtime.     • levothyroxine (SYNTHROID) 300 MCG Tab Take 300 mcg by mouth every morning.     • colestipol (COLESTID) 1 GM Tab TK 2 TS PO BID  1   • Frovatriptan Succinate (FROVA) 2.5 MG Tab Take 1 Tab by mouth as needed. 10 Tab 3   • cetirizine (ZYRTEC) 10 MG Tab Take 10 mg by mouth every day.     • multivitamin (THERAGRAN) Tab Take 1 Tab by mouth every  day.       No facility-administered encounter medications on file as of 1/31/2019.      Review of Systems   Constitutional: Negative for weight loss.   HENT: Negative for nosebleeds.    Eyes: Negative for blurred vision.   Respiratory: Negative for shortness of breath.    Cardiovascular: Positive for palpitations and leg swelling.        Left leg, had DVT after her hip Fx many years ago  -110/60 and  or so at home   Gastrointestinal: Negative for blood in stool and diarrhea.        Reflux, sleeping on 4 pillows  Also with dysphagia   Genitourinary: Negative for hematuria.   Musculoskeletal: Positive for joint pain.        Left shoulder pain, chronic but worse since May   Neurological: Positive for headaches. Negative for dizziness.        Having migraine   Psychiatric/Behavioral: The patient does not have insomnia.         Objective:   /80 (BP Location: Left arm)   Pulse (!) 108     Physical Exam   Constitutional: She is oriented to person, place, and time. No distress.   HENT:   Head: Normocephalic and atraumatic.   Eyes: EOM are normal.   Neck: No JVD present. No thyromegaly present.   Cardiovascular: Regular rhythm.  Exam reveals no gallop.    No murmur heard.  Pulmonary/Chest: Effort normal. She has no wheezes. She has no rales.   Abdominal: Soft. She exhibits no distension.   Musculoskeletal: She exhibits edema. She exhibits no deformity.   Trace Left leg   Neurological: She is alert and oriented to person, place, and time.   Skin: Skin is warm. No erythema.   Psychiatric: She has a normal mood and affect. Her behavior is normal.     EKG today by my review shows sinus tachycardia with nonspecific diffuse ST changes compared to prior echocardiogram in September 26 heart rate has increased nonspecific ST changes are new    Stress myocardial perfusion imaging January 22 was negative for ischemia or infarct    Assessment:     1. Pre-operative cardiovascular examination  EKG   2. Right heart  enlargement     3. Ascending aortic aneurysm (HCC)     4.      Sinus tachycardia    Medical Decision Making:  Today's Assessment / Status / Plan:     Symptomatically she is doing relatively well from cardiac standpoint.  Her heart rate is slightly fast today.  There is probably in part from her thyroid issue.  I advised her to contact her endocrinologist for consideration of adjusting her thyroid supplementation dose.  Her echocardiography in September showed normal left and a systolic function.  Her stress myocardial perfusion imaging earlier this month is reassuring.  I believe that she is at acceptable (moderate) risk to proceed with above procedures.  I however few that they should be performed in the hospital setting to allow closer monitoring.  Please feel free to contact me for any further questions.    In terms of her hypertension, she was advised to closely keep follow-up her blood pressure. Some of her labile blood pressures is probably in part from chronic pain and mental stress. She is to return for follow-up in the spring as previously scheduled.  Thank you for allowing us to participate in the care of this patient.

## 2019-02-01 LAB
BACTERIA WND AEROBE CULT: ABNORMAL
GRAM STN SPEC: ABNORMAL
SIGNIFICANT IND 70042: ABNORMAL
SITE SITE: ABNORMAL
SOURCE SOURCE: ABNORMAL

## 2019-02-04 NOTE — TELEPHONE ENCOUNTER
"Upon chart review, MD last OV states, \"I believe that she is at acceptable (moderate) risk to proceed with above procedures.  I however few that they should be performed in the hospital setting to allow closer monitoring.\"    Letter printed with MD recommendations.      Letter fax to [facility], [phone], completed status.    Letter sent to scanning for reference.  "

## 2019-02-07 ENCOUNTER — HOSPITAL ENCOUNTER (OUTPATIENT)
Facility: MEDICAL CENTER | Age: 54
End: 2019-02-07
Attending: INTERNAL MEDICINE | Admitting: INTERNAL MEDICINE
Payer: MEDICARE

## 2019-02-07 VITALS
HEART RATE: 71 BPM | DIASTOLIC BLOOD PRESSURE: 89 MMHG | TEMPERATURE: 97.8 F | HEIGHT: 64 IN | WEIGHT: 179.9 LBS | OXYGEN SATURATION: 94 % | BODY MASS INDEX: 30.71 KG/M2 | SYSTOLIC BLOOD PRESSURE: 139 MMHG | RESPIRATION RATE: 16 BRPM

## 2019-02-07 LAB
ERYTHROCYTE [DISTWIDTH] IN BLOOD BY AUTOMATED COUNT: 40.1 FL (ref 35.9–50)
HCT VFR BLD AUTO: 40.1 % (ref 37–47)
HGB BLD-MCNC: 13.5 G/DL (ref 12–16)
MCH RBC QN AUTO: 29.3 PG (ref 27–33)
MCHC RBC AUTO-ENTMCNC: 33.7 G/DL (ref 33.6–35)
MCV RBC AUTO: 87.2 FL (ref 81.4–97.8)
PATHOLOGY CONSULT NOTE: NORMAL
PLATELET # BLD AUTO: 255 K/UL (ref 164–446)
PMV BLD AUTO: 10.8 FL (ref 9–12.9)
RBC # BLD AUTO: 4.6 M/UL (ref 4.2–5.4)
WBC # BLD AUTO: 3.1 K/UL (ref 4.8–10.8)

## 2019-02-07 PROCEDURE — 700101 HCHG RX REV CODE 250

## 2019-02-07 PROCEDURE — 160035 HCHG PACU - 1ST 60 MINS PHASE I: Performed by: INTERNAL MEDICINE

## 2019-02-07 PROCEDURE — 500066 HCHG BITE BLOCK, ECT: Performed by: INTERNAL MEDICINE

## 2019-02-07 PROCEDURE — 160002 HCHG RECOVERY MINUTES (STAT): Performed by: INTERNAL MEDICINE

## 2019-02-07 PROCEDURE — 160048 HCHG OR STATISTICAL LEVEL 1-5: Performed by: INTERNAL MEDICINE

## 2019-02-07 PROCEDURE — 160009 HCHG ANES TIME/MIN: Performed by: INTERNAL MEDICINE

## 2019-02-07 PROCEDURE — 160046 HCHG PACU - 1ST 60 MINS PHASE II: Performed by: INTERNAL MEDICINE

## 2019-02-07 PROCEDURE — C1726 CATH, BAL DIL, NON-VASCULAR: HCPCS | Performed by: INTERNAL MEDICINE

## 2019-02-07 PROCEDURE — 88305 TISSUE EXAM BY PATHOLOGIST: CPT | Mod: 59

## 2019-02-07 PROCEDURE — 160025 RECOVERY II MINUTES (STATS): Performed by: INTERNAL MEDICINE

## 2019-02-07 PROCEDURE — 85027 COMPLETE CBC AUTOMATED: CPT

## 2019-02-07 PROCEDURE — 160203 HCHG ENDO MINUTES - 1ST 30 MINS LEVEL 4: Performed by: INTERNAL MEDICINE

## 2019-02-07 PROCEDURE — 160036 HCHG PACU - EA ADDL 30 MINS PHASE I: Performed by: INTERNAL MEDICINE

## 2019-02-07 PROCEDURE — 160208 HCHG ENDO MINUTES - EA ADDL 1 MIN LEVEL 4: Performed by: INTERNAL MEDICINE

## 2019-02-07 PROCEDURE — 700111 HCHG RX REV CODE 636 W/ 250 OVERRIDE (IP)

## 2019-02-07 PROCEDURE — 700102 HCHG RX REV CODE 250 W/ 637 OVERRIDE(OP): Performed by: ANESTHESIOLOGY

## 2019-02-07 PROCEDURE — 88312 SPECIAL STAINS GROUP 1: CPT

## 2019-02-07 PROCEDURE — A9270 NON-COVERED ITEM OR SERVICE: HCPCS | Performed by: ANESTHESIOLOGY

## 2019-02-07 RX ORDER — OXYCODONE HCL 5 MG/5 ML
5 SOLUTION, ORAL ORAL
Status: DISCONTINUED | OUTPATIENT
Start: 2019-02-07 | End: 2019-02-07

## 2019-02-07 RX ORDER — LISINOPRIL 10 MG/1
10 TABLET ORAL DAILY
COMMUNITY
End: 2019-05-13

## 2019-02-07 RX ORDER — HYDROMORPHONE HYDROCHLORIDE 1 MG/ML
0.2 INJECTION, SOLUTION INTRAMUSCULAR; INTRAVENOUS; SUBCUTANEOUS
Status: DISCONTINUED | OUTPATIENT
Start: 2019-02-07 | End: 2019-02-07 | Stop reason: HOSPADM

## 2019-02-07 RX ORDER — RANITIDINE 150 MG/1
300 TABLET ORAL 2 TIMES DAILY
Status: ON HOLD | COMMUNITY
End: 2019-11-05

## 2019-02-07 RX ORDER — MELOXICAM 15 MG/1
15 TABLET ORAL EVERY EVENING
Status: ON HOLD | COMMUNITY
End: 2019-02-19

## 2019-02-07 RX ORDER — MEPERIDINE HYDROCHLORIDE 25 MG/ML
12.5 INJECTION INTRAMUSCULAR; INTRAVENOUS; SUBCUTANEOUS
Status: DISCONTINUED | OUTPATIENT
Start: 2019-02-07 | End: 2019-02-07 | Stop reason: HOSPADM

## 2019-02-07 RX ORDER — HALOPERIDOL 5 MG/ML
1 INJECTION INTRAMUSCULAR
Status: DISCONTINUED | OUTPATIENT
Start: 2019-02-07 | End: 2019-02-07 | Stop reason: HOSPADM

## 2019-02-07 RX ORDER — ACETAMINOPHEN 500 MG
500 TABLET ORAL ONCE
Status: COMPLETED | OUTPATIENT
Start: 2019-02-07 | End: 2019-02-07

## 2019-02-07 RX ORDER — HYDRALAZINE HYDROCHLORIDE 20 MG/ML
5 INJECTION INTRAMUSCULAR; INTRAVENOUS
Status: DISCONTINUED | OUTPATIENT
Start: 2019-02-07 | End: 2019-02-07 | Stop reason: HOSPADM

## 2019-02-07 RX ORDER — LORAZEPAM 2 MG/ML
0.5 INJECTION INTRAMUSCULAR
Status: DISCONTINUED | OUTPATIENT
Start: 2019-02-07 | End: 2019-02-07 | Stop reason: HOSPADM

## 2019-02-07 RX ORDER — DIPHENHYDRAMINE HYDROCHLORIDE 50 MG/ML
12.5 INJECTION INTRAMUSCULAR; INTRAVENOUS
Status: DISCONTINUED | OUTPATIENT
Start: 2019-02-07 | End: 2019-02-07 | Stop reason: HOSPADM

## 2019-02-07 RX ORDER — SODIUM CHLORIDE, SODIUM LACTATE, POTASSIUM CHLORIDE, CALCIUM CHLORIDE 600; 310; 30; 20 MG/100ML; MG/100ML; MG/100ML; MG/100ML
INJECTION, SOLUTION INTRAVENOUS CONTINUOUS
Status: DISCONTINUED | OUTPATIENT
Start: 2019-02-07 | End: 2019-02-07 | Stop reason: HOSPADM

## 2019-02-07 RX ORDER — CLARITHROMYCIN 500 MG/1
1 TABLET, COATED ORAL 2 TIMES DAILY
COMMUNITY
Start: 2019-01-29 | End: 2019-02-13

## 2019-02-07 RX ORDER — ERENUMAB-AOOE 140 MG/ML
140 INJECTION, SOLUTION SUBCUTANEOUS
COMMUNITY
Start: 2019-01-30 | End: 2021-03-16

## 2019-02-07 RX ORDER — HYDROMORPHONE HYDROCHLORIDE 1 MG/ML
0.4 INJECTION, SOLUTION INTRAMUSCULAR; INTRAVENOUS; SUBCUTANEOUS
Status: DISCONTINUED | OUTPATIENT
Start: 2019-02-07 | End: 2019-02-07 | Stop reason: HOSPADM

## 2019-02-07 RX ORDER — OXYCODONE HCL 5 MG/5 ML
10 SOLUTION, ORAL ORAL
Status: DISCONTINUED | OUTPATIENT
Start: 2019-02-07 | End: 2019-02-07

## 2019-02-07 RX ORDER — SODIUM CHLORIDE, SODIUM LACTATE, POTASSIUM CHLORIDE, CALCIUM CHLORIDE 600; 310; 30; 20 MG/100ML; MG/100ML; MG/100ML; MG/100ML
INJECTION, SOLUTION INTRAVENOUS ONCE
Status: COMPLETED | OUTPATIENT
Start: 2019-02-07 | End: 2019-02-07

## 2019-02-07 RX ORDER — SUCRALFATE ORAL 1 G/10ML
1 SUSPENSION ORAL 4 TIMES DAILY
Qty: 560 ML | Refills: 0 | Status: SHIPPED | OUTPATIENT
Start: 2019-02-07 | End: 2019-02-21

## 2019-02-07 RX ORDER — HYDROMORPHONE HYDROCHLORIDE 1 MG/ML
0.1 INJECTION, SOLUTION INTRAMUSCULAR; INTRAVENOUS; SUBCUTANEOUS
Status: DISCONTINUED | OUTPATIENT
Start: 2019-02-07 | End: 2019-02-07 | Stop reason: HOSPADM

## 2019-02-07 RX ORDER — ONDANSETRON 2 MG/ML
4 INJECTION INTRAMUSCULAR; INTRAVENOUS
Status: DISCONTINUED | OUTPATIENT
Start: 2019-02-07 | End: 2019-02-07 | Stop reason: HOSPADM

## 2019-02-07 RX ADMIN — SODIUM CHLORIDE, SODIUM LACTATE, POTASSIUM CHLORIDE, CALCIUM CHLORIDE: 600; 310; 30; 20 INJECTION, SOLUTION INTRAVENOUS at 08:07

## 2019-02-07 RX ADMIN — ACETAMINOPHEN 500 MG: 500 TABLET, FILM COATED ORAL at 08:49

## 2019-02-07 NOTE — PROCEDURES
"Pre-procedure Diagnoses:   Esophageal dysphagia [R13.10]   Gastroesophageal reflux disease, esophagitis presence not specified [K21.9]   Chronic diarrhea [K52.9]   Bariatric surgery status [Z98.84]   Post-procedure Diagnoses:   Gastric bypass status for obesity [Z98.84]   Xerostomia [R68.2]   Procedures:   UPPER ENTEROSCOPY OR SMALL BOWEL ENDOSCOPY WITH BIOPSIES   ESOPHAGOGASTRODUODENOSCOPY WITH BALLOON DILATION OF ESOPHAGUS TO 20MM      Endoscopist: Hola Veliz MD, [facility], FACG    General anesthesis: Dr. William Woodruff    Consent: Risks, benefits, and alternatives were discussed with patient and .  Consenting persons were given an opportunity to ask questions and discuss other options.  Risks including but not limited to perforation, infection, bleeding, missed lesion(s), cardiac and/or pulmonary event, aspiration, stroke, possible need for surgery and/or interventional radiology, hospitalization possibly prolonged, discomfort, unsuccessful and/or incomplete procedure, ineffective therapy and/or persistent symptoms, possible need for repeat procedures and/or additional testings, damage to adjacent organs and/or vascular structures, medication reaction, disability, death, and other adverse events possibly life-threatening.  Discussion was undertaken with Layman's terms.  I answered questions in full and to satisfaction.  Consenting persons stated understanding and acceptance of these risks, and wished to proceed.  Informed consent was given in clear state of mind.    Endoscopic procedures in detail: Diagnostic endoscope was inserted from mouth into mid-jejenum, by definition \"more then 80cm pass the pylorus due to gastric bypass status\".  Mid-esophageal, gastric and jejunal biopsies were obtained and sent to pathology in separate containers.  18-19-20mm balloon dilation was performed at the gastroesophageal junction as well as at the upper esophageal sphincter for treatment of dysphagia.  There was suction " of insufflated air and stomach fluid contents upon removal.      Procedure times:  - In-room 09:11  - Start 09:22  - Completed 09:31  - Out of room per nursing records    Esophagogastroduodenoscopy Findings:  - Esophagus: endoscopically unremarkable, mid-esophageal biopsies obtained, 20mm balloon dilation performed.  - Stomach: gastric bypass status with patent gastrojejunostomy, gastric biopsies obtained to evaluate H.pylori status.  - Jejunum: endoscopically unremarkable to 30cm post anastomosis, jejunal biopsied obtained.    Impression:  1. 20mm balloon dilation of lower and upper  esophageal sphincters for emphirical treatment of dysphagia, if unimproved afterwards then suspect symptoms are from xerostomia   2. Gastric bypass status    Recommendations:   1.  Routine post-endoscopy anesthesia recovery care.  Discharge patient whenshe is awake, alert and comfortable, when recovery criteria are met.  Aspiration and fall precautions x 24 hours.   2.  Carafate suspension QID x 14 days.  3.  Follow-up with me for colonoscopy   4.  I plan to send patient a pathology letter with final results and any further recommendations.  5.  I spoke to patient,  and recovery nurse about impression, diagnosis and recommendations.

## 2019-02-07 NOTE — OR NURSING
1055-Pt to phase II, no c/o pain or nausea. Pt eager to go home. Family to bedside.   1110- reviewed d/c instructions with pt and family. Verbalized understanding.   1112- d/c stable via w/c into care of  without complications.

## 2019-02-07 NOTE — DISCHARGE INSTRUCTIONS
ACTIVITY: Rest and take it easy for the first 24 hours.  A responsible adult is recommended to remain with you during that time.  It is normal to feel sleepy.  We encourage you to not do anything that requires balance, judgment or coordination.    MILD FLU-LIKE SYMPTOMS ARE NORMAL. YOU MAY EXPERIENCE GENERALIZED MUSCLE ACHES, THROAT IRRITATION, HEADACHE AND/OR SOME NAUSEA.    FOR 24 HOURS DO NOT:  Drive, operate machinery or run household appliances.  Drink beer or alcoholic beverages.   Make important decisions or sign legal documents.    SPECIAL INSTRUCTIONS: Gastroscopy, Care After  Refer to this sheet in the next few weeks. These instructions provide you with information on caring for yourself after your procedure. Your caregiver may also give you more specific instructions. Your treatment has been planned according to current medical practices, but problems sometimes occur. Call your caregiver if you have any problems or questions after your procedure.  HOME CARE INSTRUCTIONS  · Avoid hot and warm beverages for the first 24 hours after the procedure.  · You may return to your normal diet and activities on the day after your procedure, or as directed by your caregiver.  · Only take over-the-counter or prescription medicines for pain, discomfort, or fever as directed by your caregiver.  · If you were given medicine to help you relax (sedative), do not drive or operate machinery for 24 hours.  SEEK IMMEDIATE MEDICAL CARE IF:  · You vomit blood or material that looks like coffee grounds.  · You have bloody, black, or tarry stools.  · You have shortness of breath.  · You have a fever.  · You have increasing abdominal pain that is not relieved with medicine.  · You have severe throat pain.  MAKE SURE YOU:  · Understand these instructions.  · Will watch your condition.  · Will get help right away if you are not doing well or get worse.     DIET: To avoid nausea, slowly advance diet as tolerated, avoiding spicy or  greasy foods for the first day.  Add more substantial food to your diet according to your physician's instructions.  Babies can be fed formula or breast milk as soon as they are hungry.  INCREASE FLUIDS AND FIBER TO AVOID CONSTIPATION.    SURGICAL DRESSING/BATHING: follow instructions given to you by your doctor.     FOLLOW-UP APPOINTMENT:  A follow-up appointment should be arranged with your doctor in 1-2 weeks; call to schedule.    You should CALL YOUR PHYSICIAN if you develop:  Fever greater than 101 degrees F.  Pain not relieved by medication, or persistent nausea or vomiting.  Excessive bleeding (blood soaking through dressing) or unexpected drainage from the wound.  Extreme redness or swelling around the incision site, drainage of pus or foul smelling drainage.  Inability to urinate or empty your bladder within 8 hours.  Problems with breathing or chest pain.    You should call 911 if you develop problems with breathing or chest pain.  If you are unable to contact your doctor or surgical center, you should go to the nearest emergency room or urgent care center.  Physician's telephone #: [phone]    If any questions arise, call your doctor.  If your doctor is not available, please feel free to call the Surgical Center at [phone].  The Center is open Monday through Friday from 7AM to 7PM.  You can also call the [employer] HOTLINE open 24 hours/day, 7 days/week and speak to a nurse at [phone], or toll free at [phone].    A registered nurse may call you a few days after your surgery to see how you are doing after your procedure.    MEDICATIONS: Resume taking daily medication.  Take prescribed pain medication with food.  If no medication is prescribed, you may take non-aspirin pain medication if needed.  PAIN MEDICATION CAN BE VERY CONSTIPATING.  Take a stool softener or laxative such as senokot, pericolace, or milk of magnesia if needed.    Prescription given for Sucralfate (anti-ulcer).     If  your physician has prescribed pain medication that includes Acetaminophen (Tylenol), do not take additional Acetaminophen (Tylenol) while taking the prescribed medication.    Depression / Suicide Risk    As you are discharged from this [facility] facility, it is important to learn how to keep safe from harming yourself.    Recognize the warning signs:  · Abrupt changes in personality, positive or negative- including increase in energy   · Giving away possessions  · Change in eating patterns- significant weight changes-  positive or negative  · Change in sleeping patterns- unable to sleep or sleeping all the time   · Unwillingness or inability to communicate  · Depression  · Unusual sadness, discouragement and loneliness  · Talk of wanting to die  · Neglect of personal appearance   · Rebelliousness- reckless behavior  · Withdrawal from people/activities they love  · Confusion- inability to concentrate     If you or a loved one observes any of these behaviors or has concerns about self-harm, here's what you can do:  · Talk about it- your feelings and reasons for harming yourself  · Remove any means that you might use to hurt yourself (examples: pills, rope, extension cords, firearm)  · Get professional help from the community (Mental Health, Substance Abuse, psychological counseling)  · Do not be alone:Call your Safe Contact- someone whom you trust who will be there for you.  · Call your local CRISIS HOTLINE 592-4481 or 720-436-8881  · Call your local Children's Mobile Crisis Response Team [place] (090) 899-3812 or www.[employer]  · Call the toll free National Suicide Prevention Hotlines   · National Suicide Prevention Lifeline 309-891-HOYP (6594)  · National Hope Line Network 800-SUICIDE (776-5346)

## 2019-02-07 NOTE — PROGRESS NOTES
Risks, benefits, and alternatives were discussed with patient and  for EGD with dilation.  Consenting persons were given an opportunity to ask questions and discuss other options.  Risks including but not limited to perforation, infection, bleeding, missed lesion(s), cardiac and/or pulmonary event, aspiration, hypoxia, stroke, possible need for surgery and/or interventional radiology, hospitalization possibly prolonged, discomfort, unsuccessful and/or incomplete procedure, indefinite diagnosis, ineffective therapy and/or persistent symptoms, possible need for repeat procedures and/or additional testings, damage to adjacent organs and/or vascular structures, medication reaction, disability, death, and other adverse events possibly life-threatening.  Discussion was undertaken with Layman's terms.  Consenting persons stated understanding and acceptance of these risks, and wished to proceed.  Informed consent was given in clear state of mind.

## 2019-02-07 NOTE — OR NURSING
POC reviewed with pt and ride. Donis light within reach, educated to call for assistance if needed. Hourly rounding in place. CBC pending.

## 2019-02-13 ENCOUNTER — APPOINTMENT (OUTPATIENT)
Dept: ADMISSIONS | Facility: MEDICAL CENTER | Age: 54
End: 2019-02-13
Attending: ORTHOPAEDIC SURGERY
Payer: MEDICARE

## 2019-02-13 RX ORDER — LIOTHYRONINE SODIUM 25 UG/1
25 TABLET ORAL EVERY EVENING
COMMUNITY
End: 2020-09-28

## 2019-02-13 NOTE — OR NURSING
"Preadmit appointment: \" Preparing for your Procedure information\" sheet given to patient with verbal and written instructions. Patient instructed to continue prescribed medications through the day before surgery, instructed to take the following medications the day of surgery per anesthesia protocol: Ventolin inhaler if needed and pt instructed to bring day of surgery, Gabapentin, Pantoprazole, Ranitidine, Synthroid.   "

## 2019-02-19 ENCOUNTER — HOSPITAL ENCOUNTER (OUTPATIENT)
Facility: MEDICAL CENTER | Age: 54
End: 2019-02-19
Attending: ORTHOPAEDIC SURGERY | Admitting: ORTHOPAEDIC SURGERY
Payer: MEDICARE

## 2019-02-19 VITALS
TEMPERATURE: 97.3 F | RESPIRATION RATE: 16 BRPM | DIASTOLIC BLOOD PRESSURE: 89 MMHG | SYSTOLIC BLOOD PRESSURE: 144 MMHG | BODY MASS INDEX: 30.86 KG/M2 | WEIGHT: 180.78 LBS | OXYGEN SATURATION: 95 % | HEIGHT: 64 IN | HEART RATE: 76 BPM

## 2019-02-19 PROCEDURE — 700101 HCHG RX REV CODE 250: Performed by: ORTHOPAEDIC SURGERY

## 2019-02-19 PROCEDURE — A6402 STERILE GAUZE <= 16 SQ IN: HCPCS | Performed by: ORTHOPAEDIC SURGERY

## 2019-02-19 PROCEDURE — 160035 HCHG PACU - 1ST 60 MINS PHASE I: Performed by: ORTHOPAEDIC SURGERY

## 2019-02-19 PROCEDURE — 700101 HCHG RX REV CODE 250

## 2019-02-19 PROCEDURE — 500028 HCHG ARTHROWAND TURBOVAC 3.5/90 SUCT.: Performed by: ORTHOPAEDIC SURGERY

## 2019-02-19 PROCEDURE — 160041 HCHG SURGERY MINUTES - EA ADDL 1 MIN LEVEL 4: Performed by: ORTHOPAEDIC SURGERY

## 2019-02-19 PROCEDURE — 160002 HCHG RECOVERY MINUTES (STAT): Performed by: ORTHOPAEDIC SURGERY

## 2019-02-19 PROCEDURE — A9270 NON-COVERED ITEM OR SERVICE: HCPCS | Performed by: ANESTHESIOLOGY

## 2019-02-19 PROCEDURE — A6223 GAUZE >16<=48 NO W/SAL W/O B: HCPCS | Performed by: ORTHOPAEDIC SURGERY

## 2019-02-19 PROCEDURE — 502581 HCHG PACK, SHOULDER ARTHROSCOPY: Performed by: ORTHOPAEDIC SURGERY

## 2019-02-19 PROCEDURE — 160029 HCHG SURGERY MINUTES - 1ST 30 MINS LEVEL 4: Performed by: ORTHOPAEDIC SURGERY

## 2019-02-19 PROCEDURE — 160048 HCHG OR STATISTICAL LEVEL 1-5: Performed by: ORTHOPAEDIC SURGERY

## 2019-02-19 PROCEDURE — 500151 HCHG CANNULA, THRDED 8.4: Performed by: ORTHOPAEDIC SURGERY

## 2019-02-19 PROCEDURE — 160009 HCHG ANES TIME/MIN: Performed by: ORTHOPAEDIC SURGERY

## 2019-02-19 PROCEDURE — 160046 HCHG PACU - 1ST 60 MINS PHASE II: Performed by: ORTHOPAEDIC SURGERY

## 2019-02-19 PROCEDURE — 700102 HCHG RX REV CODE 250 W/ 637 OVERRIDE(OP): Performed by: ANESTHESIOLOGY

## 2019-02-19 PROCEDURE — C1713 ANCHOR/SCREW BN/BN,TIS/BN: HCPCS | Performed by: ORTHOPAEDIC SURGERY

## 2019-02-19 PROCEDURE — 500891 HCHG PACK, ORTHO MAJOR: Performed by: ORTHOPAEDIC SURGERY

## 2019-02-19 PROCEDURE — 160025 RECOVERY II MINUTES (STATS): Performed by: ORTHOPAEDIC SURGERY

## 2019-02-19 PROCEDURE — 160022 HCHG BLOCK: Performed by: ORTHOPAEDIC SURGERY

## 2019-02-19 PROCEDURE — 700111 HCHG RX REV CODE 636 W/ 250 OVERRIDE (IP)

## 2019-02-19 PROCEDURE — 160036 HCHG PACU - EA ADDL 30 MINS PHASE I: Performed by: ORTHOPAEDIC SURGERY

## 2019-02-19 PROCEDURE — 501838 HCHG SUTURE GENERAL: Performed by: ORTHOPAEDIC SURGERY

## 2019-02-19 PROCEDURE — 500002 HCHG ADHESIVE, DERMABOND: Performed by: ORTHOPAEDIC SURGERY

## 2019-02-19 DEVICE — SUTURE ANCHOR 2.8MM: Type: IMPLANTABLE DEVICE | Site: SHOULDER | Status: FUNCTIONAL

## 2019-02-19 RX ORDER — DIPHENHYDRAMINE HYDROCHLORIDE 50 MG/ML
12.5 INJECTION INTRAMUSCULAR; INTRAVENOUS
Status: DISCONTINUED | OUTPATIENT
Start: 2019-02-19 | End: 2019-02-19 | Stop reason: HOSPADM

## 2019-02-19 RX ORDER — MEPERIDINE HYDROCHLORIDE 50 MG/ML
6.25 INJECTION INTRAMUSCULAR; INTRAVENOUS; SUBCUTANEOUS
Status: DISCONTINUED | OUTPATIENT
Start: 2019-02-19 | End: 2019-02-19 | Stop reason: HOSPADM

## 2019-02-19 RX ORDER — TIZANIDINE 4 MG/1
4 TABLET ORAL EVERY 6 HOURS PRN
COMMUNITY
End: 2019-05-13 | Stop reason: SDUPTHER

## 2019-02-19 RX ORDER — FUROSEMIDE 20 MG/1
10 TABLET ORAL 2 TIMES DAILY
COMMUNITY
End: 2019-10-30

## 2019-02-19 RX ORDER — CELECOXIB 200 MG/1
200 CAPSULE ORAL ONCE
Status: DISCONTINUED | OUTPATIENT
Start: 2019-02-19 | End: 2019-02-19

## 2019-02-19 RX ORDER — ONDANSETRON 2 MG/ML
4 INJECTION INTRAMUSCULAR; INTRAVENOUS
Status: DISCONTINUED | OUTPATIENT
Start: 2019-02-19 | End: 2019-02-19 | Stop reason: HOSPADM

## 2019-02-19 RX ORDER — BUPIVACAINE HYDROCHLORIDE AND EPINEPHRINE 2.5; 5 MG/ML; UG/ML
INJECTION, SOLUTION EPIDURAL; INFILTRATION; INTRACAUDAL; PERINEURAL
Status: DISCONTINUED | OUTPATIENT
Start: 2019-02-19 | End: 2019-02-19 | Stop reason: HOSPADM

## 2019-02-19 RX ORDER — SODIUM CHLORIDE, SODIUM LACTATE, POTASSIUM CHLORIDE, CALCIUM CHLORIDE 600; 310; 30; 20 MG/100ML; MG/100ML; MG/100ML; MG/100ML
INJECTION, SOLUTION INTRAVENOUS ONCE
Status: DISCONTINUED | OUTPATIENT
Start: 2019-02-19 | End: 2019-02-19 | Stop reason: CLARIF

## 2019-02-19 RX ORDER — SODIUM CHLORIDE 9 MG/ML
INJECTION, SOLUTION INTRAVENOUS
Status: DISCONTINUED | OUTPATIENT
Start: 2019-02-19 | End: 2019-02-19 | Stop reason: HOSPADM

## 2019-02-19 RX ORDER — LABETALOL HYDROCHLORIDE 5 MG/ML
INJECTION, SOLUTION INTRAVENOUS
Status: COMPLETED
Start: 2019-02-19 | End: 2019-02-19

## 2019-02-19 RX ORDER — LABETALOL HYDROCHLORIDE 5 MG/ML
10 INJECTION, SOLUTION INTRAVENOUS
Status: DISCONTINUED | OUTPATIENT
Start: 2019-02-19 | End: 2019-02-19 | Stop reason: HOSPADM

## 2019-02-19 RX ORDER — ACETAMINOPHEN 500 MG
1000 TABLET ORAL ONCE
Status: COMPLETED | OUTPATIENT
Start: 2019-02-19 | End: 2019-02-19

## 2019-02-19 RX ORDER — HALOPERIDOL 5 MG/ML
1 INJECTION INTRAMUSCULAR
Status: DISCONTINUED | OUTPATIENT
Start: 2019-02-19 | End: 2019-02-19 | Stop reason: HOSPADM

## 2019-02-19 RX ORDER — ALBUTEROL SULFATE 90 UG/1
2 AEROSOL, METERED RESPIRATORY (INHALATION) EVERY 6 HOURS PRN
Status: ON HOLD | COMMUNITY
End: 2019-11-05

## 2019-02-19 RX ORDER — GABAPENTIN 300 MG/1
300 CAPSULE ORAL ONCE
Status: DISCONTINUED | OUTPATIENT
Start: 2019-02-19 | End: 2019-02-19 | Stop reason: HOSPADM

## 2019-02-19 RX ORDER — CETIRIZINE HYDROCHLORIDE 10 MG/1
20 TABLET ORAL 2 TIMES DAILY
COMMUNITY
End: 2020-09-28

## 2019-02-19 RX ADMIN — LABETALOL HYDROCHLORIDE 10 MG: 5 INJECTION, SOLUTION INTRAVENOUS at 12:34

## 2019-02-19 RX ADMIN — LABETALOL HYDROCHLORIDE 10 MG: 5 INJECTION, SOLUTION INTRAVENOUS at 12:10

## 2019-02-19 RX ADMIN — SODIUM CHLORIDE 1000 ML: 9 INJECTION, SOLUTION INTRAVENOUS at 08:18

## 2019-02-19 RX ADMIN — LIDOCAINE HYDROCHLORIDE 0.5 ML: 10 INJECTION, SOLUTION INFILTRATION; PERINEURAL at 08:17

## 2019-02-19 RX ADMIN — LABETALOL HYDROCHLORIDE 10 MG: 5 INJECTION, SOLUTION INTRAVENOUS at 13:11

## 2019-02-19 RX ADMIN — ACETAMINOPHEN 1000 MG: 500 TABLET, COATED ORAL at 08:34

## 2019-02-19 NOTE — OR SURGEON
Immediate Post OP Note    PreOp Diagnosis: L shoulder impingement, AC arthrosis, biceps tendonitis, RTC fraying/partial tear    PostOp Diagnosis: same    Procedure(s):  SHOULDER DECOMPRESSION ARTHROSCOPIC - SUBACROMIAL - Wound Class: Clean  CLAVICLE DISTAL EXCISION - Wound Class: Clean  SHOULDER ARTHROSCOPY W/ BICIPITAL TENODESIS REPAIR - Wound Class: Clean  ROTATOR CUFF DEBRIDEMENT    Surgeon(s):  Camila Elkins M.D.    Anesthesiologist/Type of Anesthesia:  Anesthesiologist: Trace Posey M.D.  Anesthesia Technician: Graciela Barrett/General    Surgical Staff:  Circulator: Irene Patino R.N.  Relief Circulator: Carmelita Barth R.N.  Relief Scrub: Jose Jeronimo  Scrub Person: Carmelita Barth R.N.    Specimens removed if any:  None    Estimated Blood Loss: 5 mL    Findings: impingement, AC arthrosis, bursal sided partial thickness RTC tearing    Complications: none        2/19/2019 12:00 PM Camila Elkins M.D.

## 2019-02-19 NOTE — OR NURSING
1151 received from or  resp spont left shoulder dressing c/d/i  Fingers warm  Cap refill<  Good movement   1155  /118  Medicated per orders  1210 bp down to 150/86  1300 Dr Posey called and informed of [id number]  Orders to give one [id number] dose of labetelol and then may discharge  Pt instructed to take blood pressure medication when she gets [id number] bp down to 137/90  Meets discharge criteria

## 2019-02-19 NOTE — OR NURSING
Respirations easy in PACU.  OOB to recliner tolerated well.  Dressing clean and dry with ice pack to left shoulder.  Immobilizer in place.  Patient will take her lisinopril when she gets home.

## 2019-02-19 NOTE — DISCHARGE INSTRUCTIONS
ACTIVITY: Rest and take it easy for the first 24 hours.  A responsible adult is recommended to remain with you during that time.  It is normal to feel sleepy.  We encourage you to not do anything that requires balance, judgment or coordination.    MILD FLU-LIKE SYMPTOMS ARE NORMAL. YOU MAY EXPERIENCE GENERALIZED MUSCLE ACHES, THROAT IRRITATION, HEADACHE AND/OR SOME NAUSEA.    FOR 24 HOURS DO NOT:  Drive, operate machinery or run household appliances.  Drink beer or alcoholic beverages.   Make important decisions or sign legal documents.    SPECIAL INSTRUCTIONS: NOn weight bearing left arm  Follow DR Elkins discharge shoulder instructions    DIET: To avoid nausea, slowly advance diet as tolerated, avoiding spicy or greasy foods for the first day.  Add more substantial food to your diet according to your physician's instructions.  Babies can be fed formula or breast milk as soon as they are hungry.  INCREASE FLUIDS AND FIBER TO AVOID CONSTIPATION.    SURGICAL DRESSING/BATHING: Follow instructions    FOLLOW-UP APPOINTMENT:  A follow-up appointment should be arranged with your doctor in ; call to schedule.    You should CALL YOUR PHYSICIAN if you develop:  Fever greater than 101 degrees F.  Pain not relieved by medication, or persistent nausea or vomiting.  Excessive bleeding (blood soaking through dressing) or unexpected drainage from the wound.  Extreme redness or swelling around the incision site, drainage of pus or foul smelling drainage.  Inability to urinate or empty your bladder within 8 hours.  Problems with breathing or chest pain.    You should call 911 if you develop problems with breathing or chest pain.  If you are unable to contact your doctor or surgical center, you should go to the nearest emergency room or urgent care center.  Physician's telephone #: [phone]    If any questions arise, call your doctor.  If your doctor is not available, please feel free to call the Surgical Center at [phone].   The Center is open Monday through Friday from 7AM to 7PM.  You can also call the HEALTH HOTLINE open 24 hours/day, 7 days/week and speak to a nurse at [phone], or toll free at [phone].    A registered nurse may call you a few days after your surgery to see how you are doing after your procedure.    MEDICATIONS: Resume taking daily medication.  Take prescribed pain medication with food.  If no medication is prescribed, you may take non-aspirin pain medication if needed.  PAIN MEDICATION CAN BE VERY CONSTIPATING.  Take a stool softener or laxative such as senokot, pericolace, or milk of magnesia if needed.    Prescription given at home.  Last pain medication given at     If your physician has prescribed pain medication that includes Acetaminophen (Tylenol), do not take additional Acetaminophen (Tylenol) while taking the prescribed medication.    Depression / Suicide Risk    As you are discharged from this [facility] facility, it is important to learn how to keep safe from harming yourself.    Recognize the warning signs:  · Abrupt changes in personality, positive or negative- including increase in energy   · Giving away possessions  · Change in eating patterns- significant weight changes-  positive or negative  · Change in sleeping patterns- unable to sleep or sleeping all the time   · Unwillingness or inability to communicate  · Depression  · Unusual sadness, discouragement and loneliness  · Talk of wanting to die  · Neglect of personal appearance   · Rebelliousness- reckless behavior  · Withdrawal from people/activities they love  · Confusion- inability to concentrate     If you or a loved one observes any of these behaviors or has concerns about self-harm, here's what you can do:  · Talk about it- your feelings and reasons for harming yourself  · Remove any means that you might use to hurt yourself (examples: pills, rope, extension cords, firearm)  · Get professional help from the community  "(Mental Health, Substance Abuse, psychological counseling)  · Do not be alone:Call your Safe Contact- someone whom you trust who will be there for you.  · Call your local CRISIS HOTLINE 514-6523 or 903-521-6361  · Call your local Children's Mobile Crisis Response Team [place] (689) 278-2143 or www.[employer]  · Call the toll free National Suicide Prevention Hotlines   · National Suicide Prevention Lifeline 782-075-YESK (3809)  [employer] Line Network 800-SUICIDE (490-5034)    Peripheral Nerve Block Discharge Instructions from Same Day Surgery and Inpatient :      · You may experience numbness and weakness in {ARM LOCATION [id number]}  on the same side as your surgery  · This is normal. For some people, this may be an unpleasant sensation. Be very careful with your numb limb  · Ask for help when you need it  Shoulder Surgery Side Effects  · In addition to numbness and weakness you may experience other symptoms  · Other nerves that are close to those nerves injected can also be affected by local anesthesia  · You may experience a hoarseness in your voice  · Your breathing may feel different  · You may also notice drooping of your eyelid, pupil constriction, and decreased sweating, on the side of your surgery  · All of these side effects are normal and will resolve when the local anesthetic wears off   Prevent Injury  · Protect the limb like a baby  · Beware of exposing your limb to extreme heat or cold or trauma  · The limb may be injured without you noticing because it is numb  · Keep the limb elevated whenever possible  · Do not sleep on the limb  · Change the position of the limb regularly  · Avoid putting pressure on your surgical limb  Pain Control  · The initial block on the day of surgery will make your extremity feel \"numb\"  · Any consecutive injection including prior to discharge from the hospital will make your extremity feel \"numb\"  · You may feel an aching or burning when the local " anesthesia starts to wear off  · Take pain pills as prescribed by your surgeon  · Call your surgeon or anesthesiologist if you do not have adequate pain control  ·

## 2019-02-19 NOTE — DISCHARGE INSTR - OTHER INFO
Discharge instructions:    General Instructions    • Wear your white stockings during the day - these help control the typical swelling in your legs after surgery and minimize the likelihood of blood clots.    • Elevate the operative extremity when you are resting to help minimize the swelling.    • Use ice to help control the swelling and pain.  DO NOT USE HEAT - this will increase the swelling.    • Call the office if you develop fevers (over 100.5) or chills.    • You should have an office appointment about 7-10 following your discharge from the hospital.  If you do not please call [phone].      Wound Care    • You may shower 2 days after surgery if the wound is dry. Keep water exposure to the incision site brief and blot it dry when you get out.  Do not bathe or swim or Jacuzzi (ie. Do not submerge the incision) for approximately 3 to 4 weeks.    • Do not use ointments or creams on the incision.      • Keep the incision clean and dry.  Any drainage from the incision should be reported to the doctor immediately.      • You may notice some bruising around the incision and into the operative extremity.  This is not uncommon and should begin to go away within the first 2 weeks after surgery.    • At the time of surgery tape-like strips (Steri-strips) may be placed on your incision to protect it.  These will eventually come off on their own in one to two weeks, or you may remove them yourself after two weeks.    Activity      • Estimated return to work varies depending on the demands of your job.  Some ambitious patients return to desk jobs / administrative type work as early as 1 week after surgery (but usually more like 1 month).  For active labor or heavy labor, it may take 3 to 6 months to return to work.     • You should do the exercises given to you at discharge until you return for your post-op visit. At that time, you may be given a new set of exercises. You should continue to exercise until your muscles  are pain-free and you can walk without a limp. It is a good idea to continue your exercises as a lifetime commitment to keep your muscles strong.    • The question of when to drive is impossible to generalize to everyone because it is largely dependent on the individual.  Importantly, doctors do not have a license with the DMV to “clear you” or “release you” to return to driving.  There are 3 primary criteria that must be met.  You need to be off of narcotic pain medicines (otherwise you are driving under the influence).  You need to be able to get in and out of the ’s seat comfortably.  And you must have regained your normal reflexes / strength.  We recommend ‘testing’ yourself with another licensed  in an empty parking lot or quiet street first in order to check your reflexes moving your foot from pedal to pedal.      Medications    • You were prescribed a short acting, narcotic pain medication.  It is recommended that you begin to wean off of this medication in about 3 days after surgery.  To help wean off of the pain medications or to supplement your pain control you can use Tylenol to help with pain.    • You were prescribed an anti-inflammatory medication which you will take for 5 days after surgery.  Take with food if GI discomfort occurs.      • You were prescribed an anti-nausea medication that you may take as needed.    • You will not be discharged from the hospital with any antibiotics unless there are specific concerns regarding infection.

## 2019-02-20 NOTE — OP REPORT
DATE OF SERVICE:  02/19/2019    PREOPERATIVE DIAGNOSES:  Left shoulder impingement, acromioclavicular joint   arthrosis, biceps tendinitis and rotator cuff fraying/partial tearing.    POSTOPERATIVE DIAGNOSES:  Left shoulder impingement, acromioclavicular joint   arthrosis, biceps tendinitis and rotator cuff fraying/partial tearing.    PROCEDURE PERFORMED:  1.  Left shoulder diagnostic arthroscopy.  2.  Left shoulder open biceps tenodesis.  3.  Left shoulder subacromial decompression.  4.  Left shoulder distal clavicle excision.  5.  Left shoulder arthroscopic rotator cuff debridement.    ANESTHESIA:  General with an interscalene nerve block.    ANESTHESIOLOGIST:  Trace Posey MD    SPECIMENS:  None.    ASSISTANT:  None.    IMPLANTS: S&N 2.8mm Q fix suture anchor    ESTIMATED BLOOD LOSS:  5 mL    FINDINGS:  There was significant subacromial impingement as well as   bursal-sided fraying and partial-thickness tearing of the rotator cuff less   than 30% as well as significant acromioclavicular joint arthrosis.    COMPLICATIONS:  None.    POSTOPERATIVE PLAN:  The patient will be nonweightbearing on her left upper   extremity using a sling for the next 4 weeks.  She may do passive range of   motion exercises and will start physical therapy in 2-3 days.  I will see her   back in clinic in 7-10 days for suture removal.    INDICATIONS FOR PROCEDURE:  The patient is a very nice 53-year-old female who   presented to clinic a few months ago after feeling a pop in her left shoulder   when she was playing a game of cards.  The patient continued to have severe   pain for the past few months and failed conservative management, including   injections, physical therapy, anti-inflammatories and activity modification.    I discussed the risks and benefits in full including risk of bleeding,   infection, risk to surrounding structures such as blood vessels or nerves,   pain, scar, reoperation, hardware failure and risks of  anesthesia, such as   stroke, heart attack, deep venous thrombus, pulmonary embolism and death.  The   patient understood the risks and benefits and elected to proceed with   surgery.    DESCRIPTION OF PROCEDURE:  The patient was met in the preoperative holding   area where the left upper extremity was marked with my initials.  She then   underwent interscalene nerve block by the anesthesia team without   complications.  She was transferred to the operating room where she was placed   supine on the operating room table with all her bony prominences well padded.    She received preoperative antibiotics.  She was then intubated by the   anesthesia team without complications.  She was then placed in the beach chair   position again with all of her bony prominences well padded.  A preoperative   exam under anesthesia revealed full range of motion of the left shoulder, 180   degrees of forward flexion, 180 degrees of abduction, external rotation to 45   degrees.  The patient's left upper extremity was then prepped and draped in   the usual sterile fashion.  A preoperative timeout was held where all those in   the room agreed upon the correct patient, the correct site of surgery and the   correct surgery to be performed.    I began the procedure by injecting 30 mL of 0.25% Marcaine with epinephrine   into the glenohumeral joint via the posterior approach.  I then used an #11   blade to make my posterior portal.  Scope was then inserted into the shoulder   and I began my diagnostic arthroscopy.  The biceps anchor was severely frayed   and unstable and there was positive lipstick sign on the biceps as it was   pulled into the joint.  The labrum anteriorly, inferiorly and posteriorly was   intact.  The subscapularis was intact.  The cartilage on the humeral head and   glenoid was pristine.  She did have some very slight undersurface fraying of   the rotator cuff, but the rotator cuff was intact fully.  I then used an    arthroscopic biter to perform my biceps tenotomy for future tenodesis.  The   remaining biceps stump was debrided down to stable edge.  The scope was then   inserted into the subacromial space.  There was a significant subacromial   impingement and bursitis within the subacromial space and a combination of a   4.5 mm sucker shaver and Arthrocare wand was used to perform my bursectomy.    There was some fraying of the bursal side of the rotator cuff, but this did   not include tears greater than 30% of the thickness of the tendon.  I did use   the 4.5 mm sucker shaver to perform a rotator cuff debridement of this   partial-thickness tearing.  Next, I used a barrel cipriano to perform my   subacromial decompression as well as to perform my arthroscopic distal   clavicle resection.  All the debris was copiously irrigated out of the   shoulder with a 4.5 mm sucker shaver and the scope was removed and I moved on   to the open biceps tenodesis portion of the procedure.    I made a 3 cm incision in the axillary fold for my subpectoral biceps   tenodesis.  I dissected down through skin and subcutaneous tissues using Bovie   electrocautery for hemostasis.  I identified my inferior border of the   pectoralis and this was retracted superiorly.  Hohmann retractor was placed   laterally on the lateral aspect of the humerus and I identified my bicipital   groove.  A right angle snap was used to retrieve my biceps tendon within the   bicipital groove and the synovium was cleared off for my tenodesis.  I then   used a Smith and Nephew 2.8 mm Q-Fix suture anchor unicortically within the   bicipital groove and then used a free needle to pass the sutures through my   biceps tendon for tenodesis.  The proximal segment of the tendon was removed   and passed off and the tendon was tied down to the anchor without   complications.  Next, I copiously irrigated out the incision with normal   saline and performed a layered closure using 2-0  Monocryl followed by a   running 3-0 Monocryl, followed by Dermabond glue.  Portals were closed with   Prolene and a sterile dressing was applied.  Please note that all counts were   correct at the end of the case, and the patient was awoken from anesthesia   without complications.       ____________________________________     MD MYRNA Colvin / KASSI    DD:  02/19/2019 16:21:41  DT:  02/19/2019 16:57:10    D#:  [id number]  Job#:  [id number]

## 2019-02-27 ENCOUNTER — OFFICE VISIT (OUTPATIENT)
Dept: INFECTIOUS DISEASES | Facility: MEDICAL CENTER | Age: 54
End: 2019-02-27
Payer: MEDICARE

## 2019-02-27 VITALS
HEART RATE: 105 BPM | BODY MASS INDEX: 29.88 KG/M2 | HEIGHT: 64 IN | DIASTOLIC BLOOD PRESSURE: 80 MMHG | OXYGEN SATURATION: 95 % | WEIGHT: 175 LBS | TEMPERATURE: 98.1 F | SYSTOLIC BLOOD PRESSURE: 136 MMHG

## 2019-02-27 DIAGNOSIS — H66.90 CHRONIC OTITIS MEDIA, UNSPECIFIED OTITIS MEDIA TYPE: ICD-10-CM

## 2019-02-27 DIAGNOSIS — Z16.29 METHICILLIN RESISTANT STAPHYLOCOCCUS EPIDERMIDIS INFECTION: ICD-10-CM

## 2019-02-27 DIAGNOSIS — A49.8 METHICILLIN RESISTANT STAPHYLOCOCCUS EPIDERMIDIS INFECTION: ICD-10-CM

## 2019-02-27 PROCEDURE — 99204 OFFICE O/P NEW MOD 45 MIN: CPT | Performed by: INTERNAL MEDICINE

## 2019-02-27 RX ORDER — TRAMADOL HYDROCHLORIDE 50 MG/1
50 TABLET ORAL PRN
COMMUNITY
Start: 2019-02-08 | End: 2019-03-15

## 2019-02-27 RX ORDER — LINEZOLID 600 MG/1
600 TABLET, FILM COATED ORAL 2 TIMES DAILY
Qty: 28 TAB | Refills: 0 | Status: SHIPPED | OUTPATIENT
Start: 2019-02-27 | End: 2019-03-13

## 2019-02-27 NOTE — PROGRESS NOTES
"Date of Service:  2/27/2019    Reason for Consult: Otitis media    Referring Physician: Nkechi Butler MD    HPI:   Ms. Isaac is a 53-year-old woman with a history of congestive heart failure, fibromyalgia and chronic otitis media who presents to the infectious disease clinic for further evaluation and treatment at the request of her ENT physician Dr. Butler.  Patient has been battling with chronic ear infections of the right ear for many years.  She has been experiencing yellow drainage from her right ear associated with the ear pain.  She has also been noticing green mucus nasal discharge.  She has a perforated eardrum and normally wears hearing aids but has not been able to wear them secondary to current infection.  She denies any associated fevers or chills.  She has been following up with Dr. Butler and a culture was done of her right ear on January 29, 2019 and grew MRSE which is resistant to clindamycin, Bactrim and doxycycline.  Patient has multiple antibiotic allergies including Bactrim. She was previously on clindamycin and doxycycline however due to the resistance these medications were discontinued.  She has not been on any antibiotics over the past month except for erythromycin eardrops but continues to have significant purulent drainage and pain from her right ear.  She also notes that her left ear is slowly becoming more painful and \"plugged up.\"Patient denies any associated nausea, vomiting, abdominal pain or diarrhea.  She endorses chronic shortness of breath with minimal exertion since her hospitalization for pneumonia months ago.      ROS: All other ROS were reviewed and are negative except as noted above in the HPI.     Past Medical History:   Diagnosis Date   • AAA (abdominal aortic aneurysm) (Formerly [facility])    • Asthma    • Chronic pain    • Congestive heart failure (Formerly [facility])     Cardiologist, Dr. Carlson   • Fibromyalgia    • GERD (gastroesophageal reflux disease)    • Hypertension    • Migraine    • " Osteoporosis    • Renal disorder     CKD   • Urticaria    IBS    Past Surgical History:   Procedure Laterality Date   • SHOULDER DECOMPRESSION ARTHROSCOPIC Left 2/19/2019    Procedure: SHOULDER DECOMPRESSION ARTHROSCOPIC - SUBACROMIAL;  Surgeon: Camila Elkins M.D.;  Location: SURGERY [facility];  Service: Orthopedics   • CLAVICLE DISTAL EXCISION Left 2/19/2019    Procedure: CLAVICLE DISTAL EXCISION;  Surgeon: Camila Elkins M.D.;  Location: SURGERY [facility];  Service: Orthopedics   • SHOULDER ARTHROSCOPY W/ BICIPITAL TENODESIS REPAIR Left 2/19/2019    Procedure: SHOULDER ARTHROSCOPY W/ BICIPITAL TENODESIS REPAIR;  Surgeon: Camila Elkins M.D.;  Location: SURGERY [facility];  Service: Orthopedics   • GASTROSCOPY N/A 2/7/2019    Procedure: GASTROSCOPY- DIALATION AND BIOPSY;  Surgeon: Hola Veliz M.D.;  Location: [facility];  Service: Gastroenterology   • ABDOMINAL EXPLORATION  2002   • GASTRIC BYPASS LAPAROSCOPIC  1999   • HYSTERECTOMY, TOTAL ABDOMINAL  1995   • APPENDECTOMY  1974       Family History   Problem Relation Age of Onset   • Heart Disease Mother    • Diabetes Mother    • Heart Failure Mother    • Hypertension Mother    • Hypertension Father    • Heart Disease Brother    • Heart Attack Brother    • Hypertension Brother        Social History     Social History   • Marital status:      Spouse name: N/A   • Number of children: N/A   • Years of education: N/A     Occupational History   • Not on file.     Social History Main Topics   • Smoking status: Never Smoker   • Smokeless tobacco: Never Used   • Alcohol use No      Comment: Denies   • Drug use: No   • Sexual activity: Not on file     Other Topics Concern   • Not on file     Social History Narrative   • No narrative on file     History   Smoking Status   • Never Smoker   Smokeless Tobacco   • Never Used     History   Alcohol Use No     Comment: Denies         Allergies   Allergen Reactions   •  "Bactrim [Sulfamethoxazole W-Trimethoprim] Shortness of Breath   • Bee Venom Anaphylaxis   • Contrast Media With Iodine [Iodine] Shortness of Breath   • Econazole Anaphylaxis   • Floxin [Ofloxacin] Anaphylaxis, Shortness of Breath and Swelling     Cause throat swelling and difficulty breathing   • Gadolinium Derivatives Hives and Swelling     Throat swelling   • Keflex Shortness of Breath   • Levaquin Shortness of Breath   • Levofloxacin Shortness of Breath   • Morphine Anaphylaxis   • Naloxone Hives     \"hives, SOB\"   • Nitrofurantoin Shortness of Breath     ...and hives     • Norco [Apap-Fd&C Yellow #10 Al Tai-Hydrocodone] Shortness of Breath     ...and hives     • Oxycodone Shortness of Breath     ...and hives     • Penicillins Shortness of Breath     ...and hives     • Ancef [Cefazolin] Hives   • Bextra [Valdecoxib] Rash     \"Skin burn and peeling.\"   • Other Drug Hives     \"Enconazole nitrate.\" shortness of breath and hives   • Other Misc Unspecified     Adhesive tape: blisters, skin peels off   • Clindamycin      HIVES     • Tygacil [Tigecycline]      itching         Other Current Medications:  Current Outpatient Prescriptions on File Prior to Visit   Medication Sig Dispense Refill   • albuterol 108 (90 Base) MCG/ACT Aero Soln inhalation aerosol Inhale 2 Puffs by mouth every 6 hours as needed for Shortness of Breath.     • cetirizine (KLS ALLER-HAWA) 10 MG Tab Take 40 mg by mouth every day.     • furosemide (LASIX) 20 MG Tab Take 10 mg by mouth 2 times a day.     • liothyronine (CYTOMEL) 25 MCG Tab Take 25 mcg by mouth every evening.     • lisinopril (PRINIVIL) 10 MG Tab Take 10 mg by mouth every day.     • raNITidine (ZANTAC) 150 MG Tab Take 300 mg by mouth 2 times a day.     • AIMOVIG 70 MG/ML Solution Auto-injector 1 mL by Injection route Q30 DAYS.     • montelukast (SINGULAIR) 10 MG Tab TAKE 1 TABLET BY MOUTH EVERY DAY 90 Tab 3   • estradiol (ESTRACE) 0.5 MG tablet Take 0.5 mg by mouth every day.     • " "ferrous sulfate 325 (65 Fe) MG tablet Take 325 mg by mouth every day.     • SYNTHROID 75 MCG Tab Take 75 mcg by mouth Every morning on an empty stomach.     • potassium chloride SA (KDUR) 20 MEQ Tab CR Take 1 Tab by mouth every day. 30 Tab 1   • pantoprazole (PROTONIX) 40 MG Tablet Delayed Response take 1 tablet by mouth twice daily  2   • gabapentin (NEURONTIN) 400 MG Cap Take 1 Cap by mouth 3 times a day. 180 Cap 3   • Frovatriptan Succinate (FROVA) 2.5 MG Tab Take 1 Tab by mouth as needed. 10 Tab 3   • DULoxetine (CYMBALTA) 60 MG Cap DR Particles delayed-release capsule Take 1 Cap by mouth every day. 90 Cap 3   • cevimeline (EVOXAC) 30 MG capsule Take 1 Cap by mouth 3 times a day. 90 Cap 3   • Coral Calcium 1000 (390 Ca) MG Tab Take 1 Tab by mouth every day.     • magnesium oxide (MAG-OX) 400 MG Tab Take 400 mg by mouth every evening.     • Cyanocobalamin (VITAMIN B-12) 1000 MCG Tab Take 1,000 mcg by mouth every 48 hours.     • Biotin 5000 MCG Cap Take 1 Cap by mouth every day.     • Cholecalciferol (VITAMIN D) 2000 units Cap Take 1 Cap by mouth every day.     • Probiotic Product (PROBIOTIC PO) Take 1 Tab by mouth every day.     • Menaquinone-7 (VITAMIN K2) 100 MCG Cap Take 1 Tab by mouth every day.     • multivitamin (THERAGRAN) Tab Take 1 Tab by mouth every day.     • tizanidine (ZANAFLEX) 4 MG Tab Take 4 mg by mouth every 6 hours as needed.       No current facility-administered medications on file prior to visit.          History reviewed with the patient       Most Recent Vital Signs:  /80 (BP Location: Right arm, Patient Position: Sitting, BP Cuff Size: Adult)   Pulse (!) 105   Temp 36.7 °C (98.1 °F) (Temporal)   Ht 1.626 m (5' 4\")   Wt 79.4 kg (175 lb)   LMP 02/07/2016 (Within Months)   SpO2 95%   BMI 30.04 kg/m²   Physical Exam:  General: well-appearing, in no acute distress nontoxic  HEENT: sclera anicteric, MMM, no lymphadenopathy, no oral lesions, right canal clean without any drainage or " erythema, left ear canal clean without any drainage or erythema. R ear lobe is painful to palpation or tugging  Neck: supple, no LAD  Chest: CTAB, normal work of breathing, no rubs/rales/wheezing  Cardiac: RRR, normal S1 S2, no m/r/g   Abdomen: +bowel sounds, soft, non-tender, non-distended  Extremities: no edema, WWP.  Left upper extremity in sling  Skin: no rashes or worrisome lesions  Neuro: Alert and oriented to person, place and time, non-focal, moves all extremities  Psych: normal behavior and mood, pleasant    Pertinent Lab Results:  No results found for this or any previous visit (from the past 24 hour(s)).    [unfilled]  [unfilled]    Lab Results   Component Value Date/Time    SODIUM 135 01/24/2019 11:38 AM    POTASSIUM 3.7 01/24/2019 11:38 AM    CHLORIDE 107 01/24/2019 11:38 AM    CO2 20 01/24/2019 11:38 AM    GLUCOSE 151 (H) 01/24/2019 11:38 AM    BUN 19 01/24/2019 11:38 AM    CREATININE 1.08 01/24/2019 11:38 AM    BUNCREATRAT 11 10/12/2018 10:35 AM          Microbiology:  Right ear culture on 1/29/19  STAPHYLOCOCCUS INTERMEDIUS     Antibiotic Sensitivity Microscan Unit Status   Ampicillin/sulbactam Resistant <=8/4 mcg/mL Final   Clindamycin Resistant >4 mcg/mL Final   Daptomycin Sensitive <=0.5 mcg/mL Final   Erythromycin Resistant >4 mcg/mL Final   Moxifloxacin Intermediate 4 mcg/mL Final   Oxacillin Resistant >2 mcg/mL Final   Penicillin Resistant >8 mcg/mL Final   Tetracycline Resistant >8 mcg/mL Final   Trimeth/Sulfa Resistant >2/38 mcg/mL Final   Vancomycin Sensitive 1 mcg/mL Final           Studies:  None new to review    Impression/Plan:     1. Otitis media, Right   Start p.o. Zyvox for 14-day course.  Patient instructed to stop duloxetine while taking Zyvox.  Side effects discussed with patient.  If patient does not tolerate Zyvox, alternative treatment would be IV antibiotics as other oral antibiotic options are resistant to the MRSE isolate.   2. Methicillin resistant Staphylococcus epidermidis  infection   treatment per above           RV: 1 week.  Follow-up with primary care physician for chronic medical problems      Belgica Garcia M.D.    2/27/2019    Please note that this dictation was created using voice recognition software. I have worked with technical experts from [facility] to optimize the interface.  I have made every reasonable attempt to correct obvious errors, but there may be errors of grammar and possibly content that I did not discover before finalizing the note.

## 2019-03-04 ENCOUNTER — APPOINTMENT (OUTPATIENT)
Dept: INTERNAL MEDICINE | Facility: MEDICAL CENTER | Age: 54
End: 2019-03-04
Payer: MEDICARE

## 2019-03-06 ENCOUNTER — OFFICE VISIT (OUTPATIENT)
Dept: INFECTIOUS DISEASES | Facility: MEDICAL CENTER | Age: 54
End: 2019-03-06
Payer: MEDICARE

## 2019-03-06 VITALS
BODY MASS INDEX: 29.88 KG/M2 | HEART RATE: 96 BPM | TEMPERATURE: 97.1 F | OXYGEN SATURATION: 98 % | DIASTOLIC BLOOD PRESSURE: 70 MMHG | HEIGHT: 64 IN | WEIGHT: 175 LBS | SYSTOLIC BLOOD PRESSURE: 112 MMHG

## 2019-03-06 DIAGNOSIS — I51.81 TAKOTSUBO SYNDROME: ICD-10-CM

## 2019-03-06 DIAGNOSIS — J32.9 SINUSITIS, UNSPECIFIED CHRONICITY, UNSPECIFIED LOCATION: ICD-10-CM

## 2019-03-06 DIAGNOSIS — H66.90 SUBACUTE OTITIS MEDIA, UNSPECIFIED OTITIS MEDIA TYPE: ICD-10-CM

## 2019-03-06 PROCEDURE — 99213 OFFICE O/P EST LOW 20 MIN: CPT | Performed by: INTERNAL MEDICINE

## 2019-03-06 NOTE — PROGRESS NOTES
"Chief Complaint   Patient presents with   • Follow-Up     Chronic otitis media, Right       Infectious Disease clinic follow-up:  Patient is a 53 y.o. female in the clinic today for ID FU appointment.   She was seen in consultation on 2/27/2019 for otitis media.Patient has been battling with chronic ear infections of the right ear for many years.  She has been experiencing yellow drainage from her right ear associated with the ear pain.  She has also been noticing green mucus nasal discharge.  She has a perforated eardrum and normally wears hearing aids but has not been able to wear them secondary to current infection.  She denies any associated fevers or chills.  She has been following up with Dr. Butler and a culture was done of her right ear on January 29, 2019 and grew MRSE which is resistant to clindamycin, Bactrim and doxycycline.  Patient has multiple antibiotic allergies including Bactrim. She was previously on clindamycin and doxycycline however due to the resistance these medications were discontinued.  She was started on p.o. Zyvox for 14-day course.  3/6/2019-patient has come back for a follow-up.  The discharge from the ear has stopped.  Still has some sinus discharge.  It is not green anymore it is clear.  Denies any fevers denies any chills.  Denies any nausea vomiting.  Does complain of some diarrhea.  Apparently there was a \"stomach bug\" going around in the house.  Overall feels much improved.  Primary Care Provider: Benson Ramirez M.D.     REVIEW OF SYSTEMS:    As documented in my HPI    ALLERGIES:    Allergies   Allergen Reactions   • Bactrim [Sulfamethoxazole W-Trimethoprim] Shortness of Breath   • Bee Venom Anaphylaxis   • Contrast Media With Iodine [Iodine] Shortness of Breath   • Econazole Anaphylaxis   • Floxin [Ofloxacin] Anaphylaxis, Shortness of Breath and Swelling     Cause throat swelling and difficulty breathing   • Gadolinium Derivatives Hives and Swelling     Throat swelling   • " "Keflex Shortness of Breath   • Levaquin Shortness of Breath     anaphlyaxis   • Levofloxacin Shortness of Breath   • Morphine Anaphylaxis   • Naloxone Hives     \"hives, SOB\"   • Nitrofurantoin Shortness of Breath     ...and hives     • Norco [Apap-Fd&C Yellow #10 Al Tai-Hydrocodone] Shortness of Breath     ...and hives     • Oxycodone Shortness of Breath     ...and hives     • Penicillins Shortness of Breath     ...and hives     • Ancef [Cefazolin] Hives   • Bextra [Valdecoxib] Rash     \"Skin burn and peeling.\"   • Other Drug Hives     \"Enconazole nitrate.\" shortness of breath and hives   • Other Misc Unspecified     Adhesive tape: blisters, skin peels off   • Clindamycin      HIVES     • Tygacil [Tigecycline]      itching        PAST MEDICAL HISTORY:   Past Medical History:   Diagnosis Date   • AAA (abdominal aortic aneurysm) ([facility])    • Asthma    • Chronic pain    • Congestive heart failure ([facility])     Cardiologist, Dr. Carlson   • Fibromyalgia    • GERD (gastroesophageal reflux disease)    • Hypertension    • Migraine    • Osteoporosis    • Renal disorder     CKD   • Urticaria        PAST SURGICAL HISTORY:    Past Surgical History:   Procedure Laterality Date   • SHOULDER DECOMPRESSION ARTHROSCOPIC Left 2/19/2019    Procedure: SHOULDER DECOMPRESSION ARTHROSCOPIC - SUBACROMIAL;  Surgeon: Camila Elkins M.D.;  Location: [facility];  Service: Orthopedics   • CLAVICLE DISTAL EXCISION Left 2/19/2019    Procedure: CLAVICLE DISTAL EXCISION;  Surgeon: Camila Elkins M.D.;  Location: [facility];  Service: Orthopedics   • SHOULDER ARTHROSCOPY W/ BICIPITAL TENODESIS REPAIR Left 2/19/2019    Procedure: SHOULDER ARTHROSCOPY W/ BICIPITAL TENODESIS REPAIR;  Surgeon: Camila Elkins M.D.;  Location: [facility];  Service: Orthopedics   • GASTROSCOPY N/A 2/7/2019    Procedure: GASTROSCOPY- DIALATION AND BIOPSY;  Surgeon: Hola Veliz M.D.;  [facility]: [facility];  Service: Gastroenterology   • ABDOMINAL EXPLORATION  2002   • GASTRIC BYPASS LAPAROSCOPIC  1999   • HYSTERECTOMY, TOTAL ABDOMINAL  1995   • APPENDECTOMY  1974        MEDICATIONS:   Current Outpatient Prescriptions   Medication Sig Dispense Refill   • linezolid (ZYVOX) 600 MG Tab Take 1 Tab by mouth 2 times a day for 14 days. 28 Tab 0   • albuterol 108 (90 Base) MCG/ACT Aero Soln inhalation aerosol Inhale 2 Puffs by mouth every 6 hours as needed for Shortness of Breath.     • cetirizine (KLS ALLER-HAWA) 10 MG Tab Take 40 mg by mouth every day.     • furosemide (LASIX) 20 MG Tab Take 10 mg by mouth 2 times a day.     • liothyronine (CYTOMEL) 25 MCG Tab Take 25 mcg by mouth every evening.     • lisinopril (PRINIVIL) 10 MG Tab Take 10 mg by mouth every day.     • raNITidine (ZANTAC) 150 MG Tab Take 300 mg by mouth 2 times a day.     • AIMOVIG 70 MG/ML Solution Auto-injector 1 mL by Injection route Q30 DAYS.     • montelukast (SINGULAIR) 10 MG Tab TAKE 1 TABLET BY MOUTH EVERY DAY 90 Tab 3   • estradiol (ESTRACE) 0.5 MG tablet Take 0.5 mg by mouth every day.     • ferrous sulfate 325 (65 Fe) MG tablet Take 325 mg by mouth every day.     • SYNTHROID 75 MCG Tab Take 75 mcg by mouth Every morning on an empty stomach.     • potassium chloride SA (KDUR) 20 MEQ Tab CR Take 1 Tab by mouth every day. 30 Tab 1   • pantoprazole (PROTONIX) 40 MG Tablet Delayed Response take 1 tablet by mouth twice daily  2   • gabapentin (NEURONTIN) 400 MG Cap Take 1 Cap by mouth 3 times a day. 180 Cap 3   • Frovatriptan Succinate (FROVA) 2.5 MG Tab Take 1 Tab by mouth as needed. 10 Tab 3   • DULoxetine (CYMBALTA) 60 MG Cap DR Particles delayed-release capsule Take 1 Cap by mouth every day. 90 Cap 3   • cevimeline (EVOXAC) 30 MG capsule Take 1 Cap by mouth 3 times a day. 90 Cap 3   • Coral Calcium 1000 (390 Ca) MG Tab Take 1 Tab by mouth every day.     • magnesium oxide (MAG-OX) 400 MG Tab Take 400 mg by mouth every evening.     •  "Cyanocobalamin (VITAMIN B-12) 1000 MCG Tab Take 1,000 mcg by mouth every 48 hours.     • Biotin 5000 MCG Cap Take 1 Cap by mouth every day.     • Cholecalciferol (VITAMIN D) 2000 units Cap Take 1 Cap by mouth every day.     • Probiotic Product (PROBIOTIC PO) Take 1 Tab by mouth every day.     • Menaquinone-7 (VITAMIN K2) 100 MCG Cap Take 1 Tab by mouth every day.     • multivitamin (THERAGRAN) Tab Take 1 Tab by mouth every day.     • tramadol (ULTRAM) 50 MG Tab Take 50 mg by mouth as needed.     • tizanidine (ZANAFLEX) 4 MG Tab Take 4 mg by mouth every 6 hours as needed.       No current facility-administered medications for this visit.         LABORATORY DATA: Culture MRSE     PHYSICAL EXAMINATION:PE:      /70   Pulse 96   Temp 36.2 °C (97.1 °F) (Temporal)   Ht 1.626 m (5' 4\")   Wt 79.4 kg (175 lb)   LMP 02/07/2016 (Within Months)   SpO2 98%   Breastfeeding? No   BMI 30.04 kg/m²        Constitutional: patient is oriented to person, place, and time. appears well-developed and well-nourished. No distress  Eyes: Conjunctivae normal and EOM are normal. Pupils are equal, round, and reactive to light.   ENT: Lips without lesions, good dentition, oropharynx is clear and moist.  Right ear has a perforation.  But no discharge seen.  Neck: Trachea midline. Normal range of motion. Neck supple. No masses  Cardiovascular: Normal rate, regular rhythm, normal heart sounds and intact distal pulses. No murmur, gallop, or friction rub. No edema.  Pulmonary/Chest: No respiratory distress. Unlabored respiratory effort, lungs clear to auscultation. No wheezes or rales.   Abdominal: Soft, non tender. BS + x 4. No masses or hepatosplenomegaly.   Musculoskeletal: Left arm in sling  Neurological:  alert and oriented to person, place, and time. No cranial nerve deficit. Coordination normal.   Skin: Skin is warm and dry. Good turgor. No rashes visable.  Psychiatric: normal mood and affect.  behavior is normal. "        ASSESSMENT:  1. Subacute otitis media, unspecified otitis media type     2. Sinusitis, unspecified chronicity, unspecified location     3. Takotsubo syndrome          RECOMMENDATIONS:      Patient is improving with Zyvox.  She was advised to finish her prescription.  Continue follow-up with ENT.  We will see her as needed.    No Follow-up on file.

## 2019-03-07 RX ORDER — CEVIMELINE HYDROCHLORIDE 30 MG/1
30 CAPSULE ORAL 3 TIMES DAILY
Qty: 180 CAP | Refills: 3 | Status: SHIPPED | OUTPATIENT
Start: 2019-03-07 | End: 2019-03-12 | Stop reason: SDUPTHER

## 2019-03-07 NOTE — TELEPHONE ENCOUNTER
PT SEEN: 1/28/19 WITH Dr. PAIGE NEXT APPT 3/15/19 WITH Dr. WEINER  Was the patient seen in the last year in this department? Yes     Does patient have an active prescription for medications requested? No     Received Request Via: Pharmacy

## 2019-03-12 NOTE — TELEPHONE ENCOUNTER
PT SEEN: 1/28/19 WITH Dr. parmar NEXT APPT 3/15/19 WITH Dr. veras  Was the patient seen in the last year in this department? Yes     Does patient have an active prescription for medications requested? No     Received Request Via: Pharmacy

## 2019-03-13 ENCOUNTER — OFFICE VISIT (OUTPATIENT)
Dept: CARDIOLOGY | Facility: MEDICAL CENTER | Age: 54
End: 2019-03-13
Payer: MEDICARE

## 2019-03-13 VITALS
WEIGHT: 183 LBS | OXYGEN SATURATION: 97 % | BODY MASS INDEX: 31.24 KG/M2 | SYSTOLIC BLOOD PRESSURE: 138 MMHG | DIASTOLIC BLOOD PRESSURE: 99 MMHG | HEART RATE: 96 BPM | HEIGHT: 64 IN

## 2019-03-13 DIAGNOSIS — I42.8 OTHER CARDIOMYOPATHY (HCC): ICD-10-CM

## 2019-03-13 DIAGNOSIS — R00.0 SINUS TACHYCARDIA: ICD-10-CM

## 2019-03-13 DIAGNOSIS — R09.89 LABILE HYPERTENSION: ICD-10-CM

## 2019-03-13 DIAGNOSIS — I71.21 ASCENDING AORTIC ANEURYSM (HCC): ICD-10-CM

## 2019-03-13 PROCEDURE — 99214 OFFICE O/P EST MOD 30 MIN: CPT | Performed by: INTERNAL MEDICINE

## 2019-03-13 RX ORDER — CEVIMELINE HYDROCHLORIDE 30 MG/1
30 CAPSULE ORAL 3 TIMES DAILY
Qty: 270 CAP | Refills: 0 | Status: SHIPPED | OUTPATIENT
Start: 2019-03-13 | End: 2021-03-16

## 2019-03-13 ASSESSMENT — ENCOUNTER SYMPTOMS
NAUSEA: 0
SHORTNESS OF BREATH: 0
WEAKNESS: 0
SENSORY CHANGE: 0
NERVOUS/ANXIOUS: 0
PALPITATIONS: 0
BLURRED VISION: 0
VOMITING: 0
FOCAL WEAKNESS: 0

## 2019-03-13 NOTE — LETTER
[facility]   [address] [place] [address]  [place] [address]  Phone: [phone]  Fax: [phone]              Donna Isaac  1965    Encounter Date: 3/13/2019    Adriana Carlson M.D.          PROGRESS NOTE:  No notes on file      No Recipients

## 2019-03-13 NOTE — PROGRESS NOTES
Chief Complaint   Patient presents with   • HTN     follow up   Sinus tachycardia  History of stress induced cardiomyopathy  HF with presevred EF    Subjective:   Donna Isaac is a 53 y.o. female who presents today for follow-up of above issues    Denies any cardiac complaints.    She stated her heart rate at home usually runs in the 70-80  Underwent uneventful left shoulder surgery recently.  Still taking the same amount of her thyroid supplementation but will be seeing her endocrinologist soon    Past Medical History:   Diagnosis Date   • AAA (abdominal aortic aneurysm) ([facility])    • Asthma    • Chronic pain    • Congestive heart failure ([facility])     Cardiologist, Dr. Carlson   • Fibromyalgia    • GERD (gastroesophageal reflux disease)    • Hypertension    • Migraine    • Osteoporosis    • Renal disorder     CKD   • Urticaria      Past Surgical History:   Procedure Laterality Date   • SHOULDER DECOMPRESSION ARTHROSCOPIC Left 2/19/2019    Procedure: SHOULDER DECOMPRESSION ARTHROSCOPIC - SUBACROMIAL;  Surgeon: Camila Elkins M.D.;  Location: [facility];  Service: Orthopedics   • CLAVICLE DISTAL EXCISION Left 2/19/2019    Procedure: CLAVICLE DISTAL EXCISION;  Surgeon: Camila Elkins M.D.;  Location: [facility];  Service: Orthopedics   • SHOULDER ARTHROSCOPY W/ BICIPITAL TENODESIS REPAIR Left 2/19/2019    Procedure: SHOULDER ARTHROSCOPY W/ BICIPITAL TENODESIS REPAIR;  Surgeon: Camila Elkins M.D.;  Location: [facility];  Service: Orthopedics   • GASTROSCOPY N/A 2/7/2019    Procedure: GASTROSCOPY- DIALATION AND BIOPSY;  Surgeon: Hola Veliz M.D.;  Location: [facility];  Service: Gastroenterology   • ABDOMINAL EXPLORATION  2002   • GASTRIC BYPASS LAPAROSCOPIC  1999   • HYSTERECTOMY, TOTAL ABDOMINAL  1995   • APPENDECTOMY  1974     Family History   Problem Relation Age of Onset   • Heart Disease Mother    • Diabetes Mother    • Heart  "Failure Mother    • Hypertension Mother    • Hypertension Father    • Heart Disease Brother    • Heart Attack Brother    • Hypertension Brother      Social History     Social History   • Marital status:      Spouse name: N/A   • Number of children: N/A   • Years of education: N/A     Occupational History   • Not on file.     Social History Main Topics   • Smoking status: Never Smoker   • Smokeless tobacco: Never Used   • Alcohol use No      Comment: Denies   • Drug use: No   • Sexual activity: Not on file     Other Topics Concern   • Not on file     Social History Narrative   • No narrative on file     Allergies   Allergen Reactions   • Bactrim [Sulfamethoxazole W-Trimethoprim] Shortness of Breath   • Bee Venom Anaphylaxis   • Contrast Media With Iodine [Iodine] Shortness of Breath   • Econazole Anaphylaxis   • Floxin [Ofloxacin] Anaphylaxis, Shortness of Breath and Swelling     Cause throat swelling and difficulty breathing   • Gadolinium Derivatives Hives and Swelling     Throat swelling   • Keflex Shortness of Breath   • Levaquin Shortness of Breath     anaphlyaxis   • Levofloxacin Shortness of Breath   • Morphine Anaphylaxis   • Naloxone Hives     \"hives, SOB\"   • Nitrofurantoin Shortness of Breath     ...and hives     • Norco [Apap-Fd&C Yellow #10 Al Tai-Hydrocodone] Shortness of Breath     ...and hives     • Oxycodone Shortness of Breath     ...and hives     • Penicillins Shortness of Breath     ...and hives     • Ancef [Cefazolin] Hives   • Bextra [Valdecoxib] Rash     \"Skin burn and peeling.\"   • Other Drug Hives     \"Enconazole nitrate.\" shortness of breath and hives   • Other Misc Unspecified     Adhesive tape: blisters, skin peels off   • Clindamycin      HIVES     • Tygacil [Tigecycline]      itching     Outpatient Encounter Prescriptions as of 3/13/2019   Medication Sig Dispense Refill   • [DISCONTINUED] cevimeline (EVOXAC) 30 MG capsule Take 1 Cap by mouth 3 times a day. 180 Cap 3   • linezolid " (ZYVOX) 600 MG Tab Take 1 Tab by mouth 2 times a day for 14 days. 28 Tab 0   • albuterol 108 (90 Base) MCG/ACT Aero Soln inhalation aerosol Inhale 2 Puffs by mouth every 6 hours as needed for Shortness of Breath.     • cetirizine (KLS ALLER-HAWA) 10 MG Tab Take 40 mg by mouth every day.     • furosemide (LASIX) 20 MG Tab Take 10 mg by mouth every day.     • liothyronine (CYTOMEL) 25 MCG Tab Take 25 mcg by mouth every evening.     • lisinopril (PRINIVIL) 10 MG Tab Take 10 mg by mouth every day.     • raNITidine (ZANTAC) 150 MG Tab Take 300 mg by mouth 2 times a day.     • AIMOVIG 70 MG/ML Solution Auto-injector 1 mL by Injection route Q30 DAYS.     • montelukast (SINGULAIR) 10 MG Tab TAKE 1 TABLET BY MOUTH EVERY DAY 90 Tab 3   • estradiol (ESTRACE) 0.5 MG tablet Take 0.5 mg by mouth every day.     • ferrous sulfate 325 (65 Fe) MG tablet Take 325 mg by mouth every day.     • SYNTHROID 75 MCG Tab Take 75 mcg by mouth Every morning on an empty stomach.     • potassium chloride SA (KDUR) 20 MEQ Tab CR Take 1 Tab by mouth every day. 30 Tab 1   • pantoprazole (PROTONIX) 40 MG Tablet Delayed Response take 1 tablet by mouth twice daily  2   • gabapentin (NEURONTIN) 400 MG Cap Take 1 Cap by mouth 3 times a day. 180 Cap 3   • Frovatriptan Succinate (FROVA) 2.5 MG Tab Take 1 Tab by mouth as needed. 10 Tab 3   • DULoxetine (CYMBALTA) 60 MG Cap DR Particles delayed-release capsule Take 1 Cap by mouth every day. 90 Cap 3   • Coral Calcium 1000 (390 Ca) MG Tab Take 1 Tab by mouth every day.     • magnesium oxide (MAG-OX) 400 MG Tab Take 400 mg by mouth every evening.     • Cyanocobalamin (VITAMIN B-12) 1000 MCG Tab Take 1,000 mcg by mouth every 48 hours.     • Biotin 5000 MCG Cap Take 1 Cap by mouth every day.     • Cholecalciferol (VITAMIN D) 2000 units Cap Take 1 Cap by mouth every day.     • Probiotic Product (PROBIOTIC PO) Take 1 Tab by mouth every day.     • Menaquinone-7 (VITAMIN K2) 100 MCG Cap Take 1 Tab by mouth every day.   "   • multivitamin (THERAGRAN) Tab Take 1 Tab by mouth every day.     • tramadol (ULTRAM) 50 MG Tab Take 50 mg by mouth as needed.     • tizanidine (ZANAFLEX) 4 MG Tab Take 4 mg by mouth every 6 hours as needed.       No facility-administered encounter medications on file as of 3/13/2019.      Review of Systems   Constitutional: Negative for malaise/fatigue.   HENT: Negative for congestion.    Eyes: Negative for blurred vision.   Respiratory: Negative for shortness of breath.    Cardiovascular: Negative for chest pain, palpitations and leg swelling.   Gastrointestinal: Negative for nausea and vomiting.   Genitourinary: Negative for frequency.   Musculoskeletal: Positive for joint pain.   Skin: Negative for rash.   Neurological: Negative for sensory change, focal weakness and weakness.   Psychiatric/Behavioral: The patient is not nervous/anxious.         Objective:   /99 (BP Location: Right arm, Patient Position: Sitting)   Pulse 96   Ht 1.626 m (5' 4\")   Wt 83 kg (183 lb)   LMP 02/07/2016 (Within Months)   SpO2 97%   BMI 31.41 kg/m²     Physical Exam   Constitutional: She is oriented to person, place, and time. No distress.   HENT:   Head: Atraumatic.   Eyes: EOM are normal.   Neck: No JVD present. No thyromegaly present.   Cardiovascular: Normal rate and regular rhythm.  Exam reveals no gallop.    No murmur heard.  Varicose vein left leg   Pulmonary/Chest: Effort normal and breath sounds normal.   Abdominal: Soft. There is no tenderness.   Musculoskeletal: She exhibits no edema or deformity.   Sling left arm   Neurological: She is alert and oriented to person, place, and time.   Skin: Skin is warm. No erythema.   Psychiatric: She has a normal mood and affect. Her behavior is normal.     CMP 1/14 normal except glucose 114    Assessment:     1. Ascending aortic aneurysm (HCC)   4 cm by CT, normal size by echo last September EC-ECHOCARDIOGRAM COMPLETE W/O CONT   2. Labile hypertension     3. Sinus " tachycardia  EC-ECHOCARDIOGRAM COMPLETE W/O CONT   4. Other cardiomyopathy (HCC)  EC-ECHOCARDIOGRAM COMPLETE W/O CONT       Medical Decision Making:  Today's Assessment / Status / Plan:     The patient's above cardiovascular conditions are stable. Will continue current medications and have the patient return for a followup in 6 months with pre-clinic echocardiography to assess LV function and her ascending aporta. Will be happy to see the patient sooner as needed. Thank you for allowing me to participate in the caring of this patient.

## 2019-03-14 RX ORDER — MELOXICAM 15 MG/1
TABLET ORAL
Qty: 90 TAB | Refills: 0 | OUTPATIENT
Start: 2019-03-14

## 2019-03-14 NOTE — TELEPHONE ENCOUNTER
Last seen: 01/28/19 by Dr. Morales  Next appt: 03/15/19 with Dr. Ramirez    Was the patient seen in the last year in this department? Yes   Does patient have an active prescription for medications requested? No   Received Request Via: Pharmacy

## 2019-03-15 ENCOUNTER — OFFICE VISIT (OUTPATIENT)
Dept: INTERNAL MEDICINE | Facility: MEDICAL CENTER | Age: 54
End: 2019-03-15
Payer: MEDICARE

## 2019-03-15 VITALS
DIASTOLIC BLOOD PRESSURE: 90 MMHG | SYSTOLIC BLOOD PRESSURE: 114 MMHG | OXYGEN SATURATION: 98 % | HEIGHT: 64 IN | BODY MASS INDEX: 30.73 KG/M2 | TEMPERATURE: 97.1 F | WEIGHT: 180 LBS | HEART RATE: 81 BPM

## 2019-03-15 DIAGNOSIS — Z23 NEED FOR VACCINATION: ICD-10-CM

## 2019-03-15 DIAGNOSIS — R19.7 DIARRHEA, UNSPECIFIED TYPE: ICD-10-CM

## 2019-03-15 DIAGNOSIS — R68.2 DRY MOUTH: ICD-10-CM

## 2019-03-15 DIAGNOSIS — M75.102 TEAR OF LEFT ROTATOR CUFF, UNSPECIFIED TEAR EXTENT: ICD-10-CM

## 2019-03-15 DIAGNOSIS — K58.9 IRRITABLE BOWEL SYNDROME, UNSPECIFIED TYPE: ICD-10-CM

## 2019-03-15 DIAGNOSIS — I10 ESSENTIAL HYPERTENSION: Chronic | ICD-10-CM

## 2019-03-15 DIAGNOSIS — K13.0 RASH ON LIPS: ICD-10-CM

## 2019-03-15 DIAGNOSIS — N18.30 CKD (CHRONIC KIDNEY DISEASE) STAGE 3, GFR 30-59 ML/MIN (HCC): ICD-10-CM

## 2019-03-15 DIAGNOSIS — L50.8 CHRONIC URTICARIA: ICD-10-CM

## 2019-03-15 PROBLEM — L50.5 CHOLINERGIC URTICARIA: Status: RESOLVED | Noted: 2017-03-07 | Resolved: 2019-03-15

## 2019-03-15 PROCEDURE — 99214 OFFICE O/P EST MOD 30 MIN: CPT | Mod: GC | Performed by: INTERNAL MEDICINE

## 2019-03-15 ASSESSMENT — PATIENT HEALTH QUESTIONNAIRE - PHQ9
8. MOVING OR SPEAKING SO SLOWLY THAT OTHER PEOPLE COULD HAVE NOTICED. OR THE OPPOSITE, BEING SO FIGETY OR RESTLESS THAT YOU HAVE BEEN MOVING AROUND A LOT MORE THAN USUAL: NOT AT ALL
7. TROUBLE CONCENTRATING ON THINGS, SUCH AS READING THE NEWSPAPER OR WATCHING TELEVISION: NOT AT ALL
6. FEELING BAD ABOUT YOURSELF - OR THAT YOU ARE A FAILURE OR HAVE LET YOURSELF OR YOUR FAMILY DOWN: NOT AL ALL
5. POOR APPETITE OR OVEREATING: NOT AT ALL
4. FEELING TIRED OR HAVING LITTLE ENERGY: NOT AT ALL
2. FEELING DOWN, DEPRESSED, IRRITABLE, OR HOPELESS: NOT AT ALL
1. LITTLE INTEREST OR PLEASURE IN DOING THINGS: NOT AT ALL
3. TROUBLE FALLING OR STAYING ASLEEP OR SLEEPING TOO MUCH: MORE THAN HALF THE DAYS
9. THOUGHTS THAT YOU WOULD BE BETTER OFF DEAD, OR OF HURTING YOURSELF: NOT AT ALL
SUM OF ALL RESPONSES TO PHQ QUESTIONS 1-9: 2
SUM OF ALL RESPONSES TO PHQ9 QUESTIONS 1 AND 2: 0

## 2019-03-15 NOTE — PATIENT INSTRUCTIONS
Karen go to pharmacy and get tetanus vaccine, and Shingrix      Try sugarless lozenges for dry mouth
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
## 2019-03-19 ENCOUNTER — HOSPITAL ENCOUNTER (OUTPATIENT)
Dept: LAB | Facility: MEDICAL CENTER | Age: 54
End: 2019-03-19
Attending: STUDENT IN AN ORGANIZED HEALTH CARE EDUCATION/TRAINING PROGRAM
Payer: MEDICARE

## 2019-03-19 DIAGNOSIS — R19.7 DIARRHEA, UNSPECIFIED TYPE: ICD-10-CM

## 2019-03-19 DIAGNOSIS — K58.9 IRRITABLE BOWEL SYNDROME, UNSPECIFIED TYPE: ICD-10-CM

## 2019-03-19 PROCEDURE — 83516 IMMUNOASSAY NONANTIBODY: CPT

## 2019-03-19 PROCEDURE — 36415 COLL VENOUS BLD VENIPUNCTURE: CPT

## 2019-03-19 PROCEDURE — 82784 ASSAY IGA/IGD/IGG/IGM EACH: CPT

## 2019-03-21 LAB — IGA SERPL-MCNC: 63 MG/DL (ref 68–408)

## 2019-03-21 NOTE — TELEPHONE ENCOUNTER
Last seen: 03/15/19 by Dr. Ramirez  Next appt: 05/15/19 with Dr. Ramirez    Was the patient seen in the last year in this department? Yes   Does patient have an active prescription for medications requested? No   Received Request Via: Pharmacy

## 2019-03-22 LAB — GLIADIN PEPTIDE+TTG IGA+IGG SER QL IA: 3 UNITS (ref 0–19)

## 2019-03-23 ENCOUNTER — HOSPITAL ENCOUNTER (OUTPATIENT)
Dept: RADIOLOGY | Facility: MEDICAL CENTER | Age: 54
End: 2019-03-23
Attending: SURGERY
Payer: MEDICARE

## 2019-03-23 DIAGNOSIS — I71.40 ABDOMINAL AORTIC ANEURYSM WITHOUT RUPTURE (HCC): ICD-10-CM

## 2019-03-23 PROCEDURE — 71250 CT THORAX DX C-: CPT

## 2019-03-25 RX ORDER — POTASSIUM CHLORIDE 20 MEQ/1
20 TABLET, EXTENDED RELEASE ORAL DAILY
Qty: 90 TAB | Refills: 0 | OUTPATIENT
Start: 2019-03-25

## 2019-03-26 ENCOUNTER — OFFICE VISIT (OUTPATIENT)
Dept: URGENT CARE | Facility: CLINIC | Age: 54
End: 2019-03-26
Payer: MEDICARE

## 2019-03-26 VITALS
HEIGHT: 64 IN | TEMPERATURE: 97.5 F | DIASTOLIC BLOOD PRESSURE: 96 MMHG | SYSTOLIC BLOOD PRESSURE: 152 MMHG | HEART RATE: 102 BPM | RESPIRATION RATE: 16 BRPM | WEIGHT: 180 LBS | OXYGEN SATURATION: 98 % | BODY MASS INDEX: 30.73 KG/M2

## 2019-03-26 DIAGNOSIS — J01.01 ACUTE RECURRENT MAXILLARY SINUSITIS: ICD-10-CM

## 2019-03-26 DIAGNOSIS — M25.371 INSTABILITY OF RIGHT ANKLE JOINT: ICD-10-CM

## 2019-03-26 PROCEDURE — 99214 OFFICE O/P EST MOD 30 MIN: CPT | Performed by: PHYSICIAN ASSISTANT

## 2019-03-26 RX ORDER — LINEZOLID 600 MG/1
600 TABLET, FILM COATED ORAL 2 TIMES DAILY
Qty: 28 TAB | Refills: 0 | Status: SHIPPED | OUTPATIENT
Start: 2019-03-26 | End: 2019-04-09

## 2019-03-26 ASSESSMENT — ENCOUNTER SYMPTOMS
PALPITATIONS: 0
EYE DISCHARGE: 0
SHORTNESS OF BREATH: 0
SORE THROAT: 0
EYE PAIN: 0
WHEEZING: 0
FEVER: 0
SINUS PAIN: 1
DIZZINESS: 1
HEADACHES: 1
CHILLS: 0
SINUS PRESSURE: 1
EYE REDNESS: 0
COUGH: 1

## 2019-03-27 NOTE — PROGRESS NOTES
Subjective:      Donna Isaac is a 53 y.o. female who presents with Sinus Problem (X1 week sinus congestion with cough/X3 days (R) ankle pain)            Sinus Problem   This is a recurrent problem. The current episode started in the past 7 days. The problem has been gradually worsening since onset. The pain is severe. Associated symptoms include congestion, coughing, ear pain, headaches and sinus pressure. Pertinent negatives include no chills, shortness of breath or sore throat. Past treatments include antibiotics. The treatment provided significant relief.       Review of Systems   Constitutional: Positive for malaise/fatigue. Negative for chills and fever.   HENT: Positive for congestion, ear pain, sinus pain and sinus pressure. Negative for sore throat.    Eyes: Negative for pain, discharge and redness.   Respiratory: Positive for cough. Negative for shortness of breath and wheezing.    Cardiovascular: Negative for chest pain and palpitations.   Neurological: Positive for dizziness and headaches.   All other systems reviewed and are negative.    PMH:  has a past medical history of AAA (abdominal aortic aneurysm) ([facility]); Asthma; Chronic pain; Congestive heart failure ([facility]); Fibromyalgia; GERD (gastroesophageal reflux disease); Hypertension; Migraine; Osteoporosis; Renal disorder; and Urticaria.  MEDS:   Current Outpatient Prescriptions:   •  linezolid (ZYVOX) 600 MG Tab, Take 1 Tab by mouth 2 times a day for 14 days., Disp: 28 Tab, Rfl: 0  •  cevimeline (EVOXAC) 30 MG capsule, Take 1 Cap by mouth 3 times a day., Disp: 270 Cap, Rfl: 0  •  albuterol 108 (90 Base) MCG/ACT Aero Soln inhalation aerosol, Inhale 2 Puffs by mouth every 6 hours as needed for Shortness of Breath., Disp: , Rfl:   •  cetirizine (KLS ALLER-HAWA) 10 MG Tab, Take 40 mg by mouth every day., Disp: , Rfl:   •  furosemide (LASIX) 20 MG Tab, Take 10 mg by mouth 2 times a day., Disp: , Rfl:   •  tizanidine (ZANAFLEX) 4 MG Tab, Take 4 mg by  mouth every 6 hours as needed., Disp: , Rfl:   •  liothyronine (CYTOMEL) 25 MCG Tab, Take 25 mcg by mouth every evening., Disp: , Rfl:   •  lisinopril (PRINIVIL) 10 MG Tab, Take 10 mg by mouth every day., Disp: , Rfl:   •  raNITidine (ZANTAC) 150 MG Tab, Take 300 mg by mouth 2 times a day., Disp: , Rfl:   •  AIMOVIG 70 MG/ML Solution Auto-injector, 1 mL by Injection route Q30 DAYS., Disp: , Rfl:   •  montelukast (SINGULAIR) 10 MG Tab, TAKE 1 TABLET BY MOUTH EVERY DAY, Disp: 90 Tab, Rfl: 3  •  estradiol (ESTRACE) 0.5 MG tablet, Take 0.5 mg by mouth every day., Disp: , Rfl:   •  ferrous sulfate 325 (65 Fe) MG tablet, Take 325 mg by mouth every day., Disp: , Rfl:   •  SYNTHROID 75 MCG Tab, Take 75 mcg by mouth Every morning on an empty stomach., Disp: , Rfl:   •  potassium chloride SA (KDUR) 20 MEQ Tab CR, Take 1 Tab by mouth every day., Disp: 30 Tab, Rfl: 1  •  pantoprazole (PROTONIX) 40 MG Tablet Delayed Response, take 1 tablet by mouth twice daily, Disp: , Rfl: 2  •  gabapentin (NEURONTIN) 400 MG Cap, Take 1 Cap by mouth 3 times a day., Disp: 180 Cap, Rfl: 3  •  Frovatriptan Succinate (FROVA) 2.5 MG Tab, Take 1 Tab by mouth as needed., Disp: 10 Tab, Rfl: 3  •  DULoxetine (CYMBALTA) 60 MG Cap DR Particles delayed-release capsule, Take 1 Cap by mouth every day., Disp: 90 Cap, Rfl: 3  •  Coral Calcium 1000 (390 Ca) MG Tab, Take 1 Tab by mouth every day., Disp: , Rfl:   •  magnesium oxide (MAG-OX) 400 MG Tab, Take 400 mg by mouth every evening., Disp: , Rfl:   •  Cyanocobalamin (VITAMIN B-12) 1000 MCG Tab, Take 1,000 mcg by mouth every 48 hours., Disp: , Rfl:   •  Biotin 5000 MCG Cap, Take 1 Cap by mouth every day., Disp: , Rfl:   •  Cholecalciferol (VITAMIN D) 2000 units Cap, Take 1 Cap by mouth every day., Disp: , Rfl:   •  Probiotic Product (PROBIOTIC PO), Take 1 Tab by mouth every day., Disp: , Rfl:   •  Menaquinone-7 (VITAMIN K2) 100 MCG Cap, Take 1 Tab by mouth every day., Disp: , Rfl:   •  multivitamin  "(THERAGRAN) Tab, Take 1 Tab by mouth every day., Disp: , Rfl:   ALLERGIES:   Allergies   Allergen Reactions   • Bactrim [Sulfamethoxazole W-Trimethoprim] Shortness of Breath   • Bee Venom Anaphylaxis   • Contrast Media With Iodine [Iodine] Shortness of Breath   • Econazole Anaphylaxis   • Floxin [Ofloxacin] Anaphylaxis, Shortness of Breath and Swelling     Cause throat swelling and difficulty breathing   • Gadolinium Derivatives Hives and Swelling     Throat swelling   • Keflex Shortness of Breath   • Levaquin Shortness of Breath     anaphlyaxis   • Levofloxacin Shortness of Breath   • Morphine Anaphylaxis   • Naloxone Hives     \"hives, SOB\"   • Nitrofurantoin Shortness of Breath     ...and hives     • Norco [Apap-Fd&C Yellow #10 Al Tai-Hydrocodone] Shortness of Breath     ...and hives     • Oxycodone Shortness of Breath     ...and hives     • Penicillins Shortness of Breath     ...and hives     • Ancef [Cefazolin] Hives   • Bextra [Valdecoxib] Rash     \"Skin burn and peeling.\"   • Other Drug Hives     \"Enconazole nitrate.\" shortness of breath and hives   • Other Misc Unspecified     Adhesive tape: blisters, skin peels off   • Clindamycin      HIVES     • Tygacil [Tigecycline]      itching     SURGHX:   Past Surgical History:   Procedure Laterality Date   • SHOULDER DECOMPRESSION ARTHROSCOPIC Left 2/19/2019    Procedure: SHOULDER DECOMPRESSION ARTHROSCOPIC - SUBACROMIAL;  Surgeon: Camila Elkins M.D.;  Location: [facility];  Service: Orthopedics   • CLAVICLE DISTAL EXCISION Left 2/19/2019    Procedure: CLAVICLE DISTAL EXCISION;  Surgeon: Camila Elkins M.D.;  Location: [facility];  Service: Orthopedics   • SHOULDER ARTHROSCOPY W/ BICIPITAL TENODESIS REPAIR Left 2/19/2019    Procedure: SHOULDER ARTHROSCOPY W/ BICIPITAL TENODESIS REPAIR;  Surgeon: Camila Elkins M.D.;  Location: [facility];  Service: Orthopedics   • GASTROSCOPY N/A 2/7/2019    Procedure: " "GASTROSCOPY- DIALATION AND BIOPSY;  Surgeon: Hola Veliz M.D.;  Location: SUR[facility]ERY [facility];  Service: Gastroenterology   • ABDOMINAL EXPLORATION  2002   • GASTRIC BYPASS LAPAROSCOPIC  1999   • HYSTERECTOMY, TOTAL ABDOMINAL  1995   • APPENDECTOMY  1974     SOCHX:  reports that she has never smoked. She has never used smokeless tobacco. She reports that she drinks alcohol. She reports that she does not use drugs.  FH: Family history was reviewed, no pertinent findings to report  Medications, Allergies, and current problem list reviewed today in Epic       Objective:     /96 (BP Location: Right arm, Patient Position: Sitting, BP Cuff Size: Adult)   Pulse (!) 102   Temp 36.4 °C (97.5 °F) (Temporal)   Resp 16   Ht 1.626 m (5' 4\")   Wt 81.6 kg (180 lb)   LMP 02/07/2016 (Within Months)   SpO2 98%   BMI 30.90 kg/m²      Physical Exam   Constitutional: She is oriented to person, place, and time. She appears well-developed and well-nourished.  Non-toxic appearance. She does not have a sickly appearance. She does not appear ill. No distress.   HENT:   Head: Normocephalic and atraumatic.   Right Ear: Hearing, tympanic membrane, external ear and ear canal normal.   Left Ear: Hearing, tympanic membrane, external ear and ear canal normal.   Nose: Mucosal edema and rhinorrhea present. No sinus tenderness. No epistaxis. Right sinus exhibits maxillary sinus tenderness. Left sinus exhibits maxillary sinus tenderness.   Mouth/Throat: Uvula is midline, oropharynx is clear and moist and mucous membranes are normal.   Eyes: Conjunctivae and EOM are normal.   Neck: Normal range of motion. Neck supple.   Cardiovascular: Normal rate, regular rhythm, normal heart sounds and intact distal pulses.    Pulmonary/Chest: Effort normal and breath sounds normal.   Neurological: She is alert and oriented to person, place, and time.   Skin: Skin is warm and dry.   Psychiatric: She has a normal mood and affect. Her behavior is " normal. Judgment and thought content normal.   Vitals reviewed.              Assessment/Plan:   Patient is a 53-year-old female who presents with recurrent sinusitis.  Patient states that she has a complicated sinus issues that she sees an ENT for.  She has a history of recurrent MRSA and resistant bacteria.  She recently followed up with a infectious disease doctor and culture was sensative to Zyvox.  This helped greatly but she is now having a recurrent episode.  She is requesting medication for sinusitis.  She also has a history of right ankle instability.  He is asking for referral to podiatry.    1. Instability of right ankle joint  -Ankle brace  - REFERRAL TO PODIATRY    2. Acute recurrent maxillary sinusitis    - linezolid (ZYVOX) 600 MG Tab; Take 1 Tab by mouth 2 times a day for 14 days.  Dispense: 28 Tab; Refill: 0    Differential diagnosis, natural history, supportive care discussed. Follow-up with primary care provider within 7-10 days, emergency room precautions discussed.  Patient and/or family appears understanding of information.  Handout and review of patients diagnosis and treatment was discussed extensively.

## 2019-04-04 ENCOUNTER — HOSPITAL ENCOUNTER (OUTPATIENT)
Dept: CARDIOLOGY | Facility: MEDICAL CENTER | Age: 54
End: 2019-04-04
Attending: INTERNAL MEDICINE
Payer: MEDICARE

## 2019-04-04 DIAGNOSIS — I71.21 ASCENDING AORTIC ANEURYSM (HCC): ICD-10-CM

## 2019-04-04 DIAGNOSIS — R00.0 SINUS TACHYCARDIA: ICD-10-CM

## 2019-04-04 DIAGNOSIS — I42.8 OTHER CARDIOMYOPATHY (HCC): ICD-10-CM

## 2019-04-04 PROCEDURE — 93306 TTE W/DOPPLER COMPLETE: CPT

## 2019-04-05 LAB
LV EJECT FRACT  99904: 75
LV EJECT FRACT MOD 2C 99903: 60.73
LV EJECT FRACT MOD 4C 99902: 61.94
LV EJECT FRACT MOD BP 99901: 62.96

## 2019-04-05 PROCEDURE — 93306 TTE W/DOPPLER COMPLETE: CPT | Mod: 26 | Performed by: INTERNAL MEDICINE

## 2019-04-05 RX ORDER — POTASSIUM CHLORIDE 20 MEQ/1
20 TABLET, EXTENDED RELEASE ORAL DAILY
Qty: 90 TAB | Refills: 0 | OUTPATIENT
Start: 2019-04-05

## 2019-04-15 ENCOUNTER — HOSPITAL ENCOUNTER (OUTPATIENT)
Dept: LAB | Facility: MEDICAL CENTER | Age: 54
End: 2019-04-15
Attending: OTOLARYNGOLOGY
Payer: MEDICARE

## 2019-04-15 LAB
GRAM STN SPEC: NORMAL
SIGNIFICANT IND 70042: NORMAL
SITE SITE: NORMAL
SOURCE SOURCE: NORMAL

## 2019-04-15 PROCEDURE — 87070 CULTURE OTHR SPECIMN AEROBIC: CPT

## 2019-04-15 PROCEDURE — 87075 CULTR BACTERIA EXCEPT BLOOD: CPT

## 2019-04-15 PROCEDURE — 87205 SMEAR GRAM STAIN: CPT

## 2019-04-17 DIAGNOSIS — I10 ESSENTIAL HYPERTENSION: Primary | ICD-10-CM

## 2019-04-17 LAB
BACTERIA WND AEROBE CULT: ABNORMAL
BACTERIA WND AEROBE CULT: ABNORMAL
GRAM STN SPEC: ABNORMAL
SIGNIFICANT IND 70042: ABNORMAL
SITE SITE: ABNORMAL
SOURCE SOURCE: ABNORMAL

## 2019-04-17 RX ORDER — POTASSIUM CHLORIDE 20 MEQ/1
TABLET, EXTENDED RELEASE ORAL
Qty: 90 TAB | Refills: 3 | Status: SHIPPED | OUTPATIENT
Start: 2019-04-17 | End: 2019-04-30

## 2019-04-17 RX ORDER — EPINEPHRINE 0.3 MG/.3ML
INJECTION SUBCUTANEOUS
Qty: 2 EACH | Refills: 0 | Status: SHIPPED | OUTPATIENT
Start: 2019-04-17 | End: 2019-04-22

## 2019-04-22 ENCOUNTER — HOSPITAL ENCOUNTER (EMERGENCY)
Facility: MEDICAL CENTER | Age: 54
End: 2019-04-23
Attending: EMERGENCY MEDICINE
Payer: MEDICARE

## 2019-04-22 DIAGNOSIS — L29.9 ITCHING: ICD-10-CM

## 2019-04-22 DIAGNOSIS — R21 RASH: ICD-10-CM

## 2019-04-22 PROCEDURE — 96374 THER/PROPH/DIAG INJ IV PUSH: CPT

## 2019-04-22 PROCEDURE — 700111 HCHG RX REV CODE 636 W/ 250 OVERRIDE (IP): Performed by: EMERGENCY MEDICINE

## 2019-04-22 PROCEDURE — 96375 TX/PRO/DX INJ NEW DRUG ADDON: CPT

## 2019-04-22 PROCEDURE — 99284 EMERGENCY DEPT VISIT MOD MDM: CPT

## 2019-04-22 RX ORDER — PREDNISONE 20 MG/1
60 TABLET ORAL ONCE
Status: COMPLETED | OUTPATIENT
Start: 2019-04-22 | End: 2019-04-22

## 2019-04-22 RX ORDER — DIPHENHYDRAMINE HYDROCHLORIDE 50 MG/ML
25 INJECTION INTRAMUSCULAR; INTRAVENOUS ONCE
Status: COMPLETED | OUTPATIENT
Start: 2019-04-22 | End: 2019-04-22

## 2019-04-22 RX ORDER — PREDNISONE 20 MG/1
40 TABLET ORAL DAILY
Qty: 6 TAB | Refills: 0 | Status: SHIPPED | OUTPATIENT
Start: 2019-04-22 | End: 2019-04-25

## 2019-04-22 RX ORDER — EPINEPHRINE 0.3 MG/.3ML
0.3 INJECTION SUBCUTANEOUS ONCE
Qty: 0.3 ML | Refills: 1 | Status: SHIPPED | OUTPATIENT
Start: 2019-04-22 | End: 2019-04-22

## 2019-04-22 RX ADMIN — DIPHENHYDRAMINE HYDROCHLORIDE 25 MG: 50 INJECTION INTRAMUSCULAR; INTRAVENOUS at 23:29

## 2019-04-22 RX ADMIN — FAMOTIDINE 20 MG: 10 INJECTION, SOLUTION INTRAVENOUS at 23:29

## 2019-04-22 RX ADMIN — PREDNISONE 60 MG: 20 TABLET ORAL at 23:29

## 2019-04-23 ENCOUNTER — HOSPITAL ENCOUNTER (OUTPATIENT)
Dept: LAB | Facility: MEDICAL CENTER | Age: 54
End: 2019-04-23
Attending: INTERNAL MEDICINE
Payer: MEDICARE

## 2019-04-23 VITALS
DIASTOLIC BLOOD PRESSURE: 96 MMHG | HEART RATE: 76 BPM | HEIGHT: 64 IN | BODY MASS INDEX: 30.86 KG/M2 | RESPIRATION RATE: 18 BRPM | WEIGHT: 180.78 LBS | TEMPERATURE: 98.6 F | OXYGEN SATURATION: 98 % | SYSTOLIC BLOOD PRESSURE: 168 MMHG

## 2019-04-23 LAB
ALBUMIN SERPL BCP-MCNC: 4.7 G/DL (ref 3.2–4.9)
ALBUMIN/GLOB SERPL: 1.5 G/DL
ALP SERPL-CCNC: 101 U/L (ref 30–99)
ALT SERPL-CCNC: 28 U/L (ref 2–50)
ANION GAP SERPL CALC-SCNC: 13 MMOL/L (ref 0–11.9)
AST SERPL-CCNC: 26 U/L (ref 12–45)
BILIRUB SERPL-MCNC: 0.4 MG/DL (ref 0.1–1.5)
BUN SERPL-MCNC: 9 MG/DL (ref 8–22)
CALCIUM SERPL-MCNC: 10.1 MG/DL (ref 8.5–10.5)
CHLORIDE SERPL-SCNC: 101 MMOL/L (ref 96–112)
CHOLEST SERPL-MCNC: 183 MG/DL (ref 100–199)
CO2 SERPL-SCNC: 23 MMOL/L (ref 20–33)
CREAT SERPL-MCNC: 1.01 MG/DL (ref 0.5–1.4)
CREAT UR-MCNC: 39 MG/DL
FASTING STATUS PATIENT QL REPORTED: NORMAL
GLOBULIN SER CALC-MCNC: 3.1 G/DL (ref 1.9–3.5)
GLUCOSE SERPL-MCNC: 127 MG/DL (ref 65–99)
HDLC SERPL-MCNC: 100 MG/DL
LDLC SERPL CALC-MCNC: 67 MG/DL
PHOSPHATE SERPL-MCNC: 3.3 MG/DL (ref 2.5–4.5)
POTASSIUM SERPL-SCNC: 4 MMOL/L (ref 3.6–5.5)
PROT SERPL-MCNC: 7.8 G/DL (ref 6–8.2)
PTH-INTACT SERPL-MCNC: 61.4 PG/ML (ref 14–72)
SODIUM SERPL-SCNC: 137 MMOL/L (ref 135–145)
TRIGL SERPL-MCNC: 80 MG/DL (ref 0–149)

## 2019-04-23 PROCEDURE — 36415 COLL VENOUS BLD VENIPUNCTURE: CPT

## 2019-04-23 PROCEDURE — 84305 ASSAY OF SOMATOMEDIN: CPT

## 2019-04-23 PROCEDURE — 82340 ASSAY OF CALCIUM IN URINE: CPT

## 2019-04-23 PROCEDURE — 80061 LIPID PANEL: CPT

## 2019-04-23 PROCEDURE — 80053 COMPREHEN METABOLIC PANEL: CPT

## 2019-04-23 PROCEDURE — 83970 ASSAY OF PARATHORMONE: CPT

## 2019-04-23 PROCEDURE — 84105 ASSAY OF URINE PHOSPHORUS: CPT

## 2019-04-23 PROCEDURE — 84100 ASSAY OF PHOSPHORUS: CPT

## 2019-04-23 PROCEDURE — 82570 ASSAY OF URINE CREATININE: CPT

## 2019-04-23 RX ORDER — EPINEPHRINE 0.3 MG/.3ML
0.3 INJECTION SUBCUTANEOUS ONCE
Qty: 0.3 ML | Refills: 0 | Status: SHIPPED | OUTPATIENT
Start: 2019-04-23 | End: 2019-04-23

## 2019-04-23 RX ORDER — PREDNISONE 20 MG/1
40 TABLET ORAL DAILY
Qty: 6 TAB | Refills: 0 | Status: SHIPPED | OUTPATIENT
Start: 2019-04-23 | End: 2019-04-26

## 2019-04-23 NOTE — DISCHARGE INSTRUCTIONS
Take prednisone for 3 more days.  Take Benadryl and Pepcid if helpful for itching.  Take epinephrine for severe allergic reaction, swelling of the lips and tongue, shortness of breath, severe dizziness.  If you have a severe allergic reaction or administer epinephrine please come to the ER for an evaluation.    You had a borderline or high normal blood pressure reading today.  This does not necessarily mean you have hypertension.  Please followup with your/a primary physician for comprehensive blood pressure evaluation and yearly fasting cholesterol assessment.  175/107

## 2019-04-23 NOTE — ED TRIAGE NOTES
Pt reports onset of hives/itching at 0900. Throughout the day, she attempted benadryl at home without relief. Last benadryl at 2100, 50mg. Pt also states that she used her epi pen at 1900 without relief. Pt has hx of hives with unknown origin.

## 2019-04-23 NOTE — ED PROVIDER NOTES
ED Provider Note    CHIEF COMPLAINT  Chief Complaint   Patient presents with   • Hives     onset 0900, unknown cause       HPI  Donna Isaac is a 53 y.o. female who presents with rash on both thighs and diffuse body itching onset today.  She is taken 3 doses of Benadryl 50, 1 dose of epinephrine without much benefit.  She has a history of an idiopathic urticaria.  She has many medication allergies but took none of them.  She had no real benefit to epinephrine.  She has not taken a steroid.  Denieswheezing, swelling of the lips and the tongue, dizziness.  History of asthma which is well-controlled    REVIEW OF SYSTEMS  Pertinent positives include: Rash, itching, shortness of breath, cough.  Pertinent negatives include: Vomiting, swelling of the lips and tongue.    PAST MEDICAL HISTORY  Past Medical History:   Diagnosis Date   • AAA (abdominal aortic aneurysm) ([facility])    • Asthma    • Chronic pain    • Congestive heart failure ([facility])     Cardiologist, Dr. Carlson   • Fibromyalgia    • GERD (gastroesophageal reflux disease)    • Hypertension    • Migraine    • Osteoporosis    • Renal disorder     CKD   • Urticaria        SOCIAL HISTORY  Social History   Substance Use Topics   • Smoking status: Never Smoker   • Smokeless tobacco: Never Used   • Alcohol use Yes      Comment: 1-2 /month      .    CURRENT MEDICATIONS  Home Medications     Reviewed by Prakash Medrano R.N. (Registered Nurse) on 04/22/19 at 2312  Med List Status: Partial   Medication Last Dose Status   AIMOVIG 70 MG/ML Solution Auto-injector  Active   albuterol 108 (90 Base) MCG/ACT Aero Soln inhalation aerosol  Active   Biotin 5000 MCG Cap  Active   cetirizine (KLS ALLER-HAWA) 10 MG Tab  Active   cevimeline (EVOXAC) 30 MG capsule  Active   Cholecalciferol (VITAMIN D) 2000 units Cap  Active   Coral Calcium 1000 (390 Ca) MG Tab  Active   Cyanocobalamin (VITAMIN B-12) 1000 MCG Tab  Active   DULoxetine (CYMBALTA) 60 MG Cap DR Particles delayed-release  "capsule  Active   EPINEPHrine (EPIPEN) 0.3 MG/0.3ML Solution Auto-injector solution for injection  Active   estradiol (ESTRACE) 0.5 MG tablet  Active   ferrous sulfate 325 (65 Fe) MG tablet  Active   Frovatriptan Succinate (FROVA) 2.5 MG Tab  Active   furosemide (LASIX) 20 MG Tab  Active   gabapentin (NEURONTIN) 400 MG Cap  Active   liothyronine (CYTOMEL) 25 MCG Tab  Active   lisinopril (PRINIVIL) 10 MG Tab  Active   magnesium oxide (MAG-OX) 400 MG Tab  Active   Menaquinone-7 (VITAMIN K2) 100 MCG Cap  Active   montelukast (SINGULAIR) 10 MG Tab  Active   multivitamin (THERAGRAN) Tab  Active   pantoprazole (PROTONIX) 40 MG Tablet Delayed Response  Active   potassium chloride SA (KDUR) 20 MEQ Tab CR  Active   potassium chloride SA (KDUR) 20 MEQ Tab CR  Active   Probiotic Product (PROBIOTIC PO)  Active   raNITidine (ZANTAC) 150 MG Tab  Active   SYNTHROID 75 MCG Tab  Active   tizanidine (ZANAFLEX) 4 MG Tab  Active                ALLERGIES  Allergies   Allergen Reactions   • Bactrim [Sulfamethoxazole W-Trimethoprim] Shortness of Breath   • Bee Venom Anaphylaxis   • Contrast Media With Iodine [Iodine] Shortness of Breath   • Econazole Anaphylaxis   • Floxin [Ofloxacin] Anaphylaxis, Shortness of Breath and Swelling     Cause throat swelling and difficulty breathing   • Gadolinium Derivatives Hives and Swelling     Throat swelling   • Keflex Shortness of Breath   • Levaquin Shortness of Breath     anaphlyaxis   • Levofloxacin Shortness of Breath   • Morphine Anaphylaxis   • Naloxone Hives     \"hives, SOB\"   • Nitrofurantoin Shortness of Breath     ...and hives     • Norco [Apap-Fd&C Yellow #10 Al Tai-Hydrocodone] Shortness of Breath     ...and hives     • Oxycodone Shortness of Breath     ...and hives     • Penicillins Shortness of Breath     ...and hives     • Ancef [Cefazolin] Hives   • Bextra [Valdecoxib] Rash     \"Skin burn and peeling.\"   • Other Drug Hives     \"Enconazole nitrate.\" shortness of breath and hives   • " "Other Misc Unspecified     Adhesive tape: blisters, skin peels off   • Clindamycin      HIVES     • Tygacil [Tigecycline]      itching       PHYSICAL EXAM  VITAL SIGNS: BP (!) 175/107   Pulse (!) 105   Temp 37 °C (98.6 °F) (Temporal)   Resp 20   Ht 1.626 m (5' 4\")   Wt 82 kg (180 lb 12.4 oz)   LMP 02/07/2016 (Within Months)   SpO2 98%   BMI 31.03 kg/m²   Constitutional :  Well developed, Well nourished, hypertensive, tachycardic, no hypoxia on room air, borderline tachypneic, well-appearing.   HNT: Oropharynx moist without erythema or edema.   Ears: Normal.  Eyes: pupils reactive without eye discharge nor conjunctival hyperemia.  Neck: Normal range of motion, No tenderness, Supple, No stridor.   Cardiovascular: Regular rhythm, No murmurs, No rubs, No gallops.  No cyanosis.   Respiratory: No rales, rhonchi, wheeze, stridor  Abdomen:  Soft, nontender  Skin: Red patches to 2 cm on her thighs but they are not raised, no diffuse rash.   Musculoskeletal: no limb deformities.      COURSE & MEDICAL DECISION MAKING  This patient with a history of recurrent idiopathic urticaria presents with a focal rash on both thighs that is not exactly typical of urticaria associated with whole body itching not responsive to Benadryl or epinephrine.  This still could be a form of urticaria.  There is no evidence of anaphylaxis, angioedema or bronchospasm.    Medications   diphenhydrAMINE (BENADRYL) injection 25 mg (25 mg Intravenous Given 4/22/19 2329)   famotidine (PEPCID) injection 20 mg (20 mg Intravenous Given 4/22/19 2329)   predniSONE (DELTASONE) tablet 60 mg (60 mg Oral Given 4/22/19 2329)       This patient has borderline or elevated blood pressure as recorded above and was instructed to followup with primary physician for comprehensive blood pressure evaluation and yearly fasting cholesterol assessment according to to CMS protocol.    PLAN:  New Prescriptions    EPINEPHRINE (EPIPEN) 0.3 MG/0.3ML SOLUTION AUTO-INJECTOR " SOLUTION FOR INJECTION    0.3 mL by Intramuscular route Once for 1 dose.    PREDNISONE (DELTASONE) 20 MG TAB    Take 2 Tabs by mouth every day for 3 days.       Anaphylaxis handout given  Return for shortness of breath, swelling of the lips and tongue, dizziness, ill appearance    Benson Ramirez M.D.  [address] [address]  [phone]    Schedule an appointment as soon as possible for a visit   As needed if not better 2 days      CONDITION:  Good.    FINAL IMPRESSION:  1. Itching    2. Rash    3.      History of urticaria and anaphylaxis      Electronically signed by: Benson Larson, 4/22/2019

## 2019-04-25 LAB
CALCIUM 24H UR-MCNC: 3.2 MG/DL
CALCIUM 24H UR-MRATE: NORMAL MG/D
CALCIUM/CREAT 24H UR: 110 MG/G (ref 20–300)
COLLECT DURATION TIME SPEC: NORMAL HRS
COLLECT DURATION TIME SPEC: NORMAL HRS
CREAT 24H UR-MCNC: 29 MG/DL
CREAT 24H UR-MRATE: NORMAL MG/D (ref 500–1400)
IGF-I SERPL-MCNC: 102 NG/ML (ref 51–233)
IGF-I Z-SCORE SERPL: -0.4
PHOSPHATE 24H UR-MCNC: 10 MG/DL
PHOSPHATE 24H UR-MRATE: NORMAL MG/D (ref 400–1300)
PHOSPHATE/CREAT 24H UR: 345 MG/G
SPECIMEN VOL ?TM UR: NORMAL ML
TOTAL VOLUME 1105: NORMAL ML

## 2019-04-30 ENCOUNTER — OFFICE VISIT (OUTPATIENT)
Dept: URGENT CARE | Facility: CLINIC | Age: 54
End: 2019-04-30
Payer: MEDICARE

## 2019-04-30 ENCOUNTER — OFFICE VISIT (OUTPATIENT)
Dept: NEUROLOGY | Facility: MEDICAL CENTER | Age: 54
End: 2019-04-30
Payer: MEDICARE

## 2019-04-30 VITALS
OXYGEN SATURATION: 96 % | BODY MASS INDEX: 30.73 KG/M2 | WEIGHT: 180 LBS | HEIGHT: 64 IN | SYSTOLIC BLOOD PRESSURE: 120 MMHG | TEMPERATURE: 98.1 F | DIASTOLIC BLOOD PRESSURE: 78 MMHG | RESPIRATION RATE: 20 BRPM | HEART RATE: 84 BPM

## 2019-04-30 VITALS
SYSTOLIC BLOOD PRESSURE: 98 MMHG | HEIGHT: 64 IN | TEMPERATURE: 98 F | WEIGHT: 181 LBS | BODY MASS INDEX: 30.9 KG/M2 | DIASTOLIC BLOOD PRESSURE: 60 MMHG | RESPIRATION RATE: 16 BRPM | OXYGEN SATURATION: 96 % | HEART RATE: 82 BPM

## 2019-04-30 DIAGNOSIS — H72.91 PERFORATED EAR DRUM, RIGHT: ICD-10-CM

## 2019-04-30 DIAGNOSIS — R56.9 SEIZURE (HCC): ICD-10-CM

## 2019-04-30 DIAGNOSIS — L30.9 DERMATITIS: ICD-10-CM

## 2019-04-30 DIAGNOSIS — H66.004 RECURRENT ACUTE SUPPURATIVE OTITIS MEDIA OF RIGHT EAR WITHOUT SPONTANEOUS RUPTURE OF TYMPANIC MEMBRANE: ICD-10-CM

## 2019-04-30 DIAGNOSIS — G43.009 MIGRAINE WITHOUT AURA AND WITHOUT STATUS MIGRAINOSUS, NOT INTRACTABLE: ICD-10-CM

## 2019-04-30 DIAGNOSIS — T50.905A MEDICATION REACTION, INITIAL ENCOUNTER: ICD-10-CM

## 2019-04-30 PROCEDURE — 99204 OFFICE O/P NEW MOD 45 MIN: CPT | Performed by: PSYCHIATRY & NEUROLOGY

## 2019-04-30 PROCEDURE — 99214 OFFICE O/P EST MOD 30 MIN: CPT | Performed by: PHYSICIAN ASSISTANT

## 2019-04-30 RX ORDER — COLESEVELAM 180 1/1
625 TABLET ORAL 3 TIMES DAILY
Status: ON HOLD | COMMUNITY
End: 2021-12-29

## 2019-04-30 RX ORDER — PILOCARPINE HYDROCHLORIDE 5 MG/1
5 TABLET, FILM COATED ORAL 2 TIMES DAILY
COMMUNITY
Start: 2019-04-17 | End: 2019-10-30

## 2019-04-30 RX ORDER — TRIAMCINOLONE ACETONIDE 1 MG/ML
1 LOTION TOPICAL 3 TIMES DAILY
Qty: 1 BOTTLE | Refills: 0 | Status: SHIPPED | OUTPATIENT
Start: 2019-04-30 | End: 2019-04-30

## 2019-04-30 RX ORDER — EPINEPHRINE 0.3 MG/.3ML
0.3 INJECTION SUBCUTANEOUS ONCE
COMMUNITY
Start: 2019-04-24 | End: 2020-09-28

## 2019-04-30 RX ORDER — CLOTRIMAZOLE 1 G/ML
SOLUTION TOPICAL
COMMUNITY
Start: 2019-04-26 | End: 2019-04-30

## 2019-04-30 ASSESSMENT — ENCOUNTER SYMPTOMS
HALLUCINATIONS: 0
FEVER: 0
EYE DISCHARGE: 0
ABDOMINAL PAIN: 0
COUGH: 0
SHORTNESS OF BREATH: 0
SORE THROAT: 0
WEIGHT LOSS: 0
BRUISES/BLEEDS EASILY: 1
FALLS: 0

## 2019-04-30 NOTE — PROGRESS NOTES
Subjective:   Donna Isaac is a 53 y.o. female who presents for Otalgia (Since last night ear pain .)        Patient sees an ENT regularly for chronic right ear problems.  History of chronic right eardrum perforation, chronic acute otitis media, and yeast infection of right ear. She was supposed to start topical medication to treat the yeast infection last week however the pharmacy has not yet stopped the medication.  She describes abrupt onset of ear pain last night with purulent discharge from the EAC.  Denies nausea, vomiting, fevers, chills, bleeding, vertigo, headache.  She endorses right-sided lymphadenopathy.  Extensive allergies to medications.  Most recently started on Linezolid for sinus infection.  She developed a rash after day of taking this medication. There is a topical abx drop that pt was previously prescribed- she does not recall the name of this medcation.      Otalgia    There is pain in the right ear. This is a new problem. The current episode started yesterday. The problem occurs constantly. The problem has been gradually worsening. There has been no fever. The pain is severe. Associated symptoms include ear discharge, hearing loss (chronic- using hearing aids.) and a rash. Pertinent negatives include no coughing. She has tried antibiotics (zyvox) for the symptoms. The treatment provided no relief. Her past medical history is significant for a chronic ear infection and hearing loss. There is no history of a tympanostomy tube.   Rash   This is a new problem. The current episode started yesterday. The problem is unchanged. The affected locations include the chest, torso and back. The rash is characterized by itchiness and redness. Associated with: linezolid. Pertinent negatives include no cough. Past treatments include nothing (Currently unable to use antihistamines as she has an evaluation with allergist tomorrow and instructed not to use this medications 5 days prior.). The treatment  provided no relief. Her past medical history is significant for allergies.     Review of Systems   HENT: Positive for ear discharge, ear pain and hearing loss (chronic- using hearing aids.).    Respiratory: Negative for cough.    Skin: Positive for rash.       PMH:  has a past medical history of AAA (abdominal aortic aneurysm) (HCC); Asthma; Chronic pain; Congestive heart failure (HCC); Fibromyalgia; GERD (gastroesophageal reflux disease); Hypertension; Migraine; Osteoporosis; Renal disorder; and Urticaria.  MEDS:   Current Outpatient Prescriptions:   •  triamcinolone (KENALOG) 0.1 % lotion, Apply 1 Units to affected area(s) 3 times a day., Disp: 1 Bottle, Rfl: 0  •  cevimeline (EVOXAC) 30 MG capsule, Take 1 Cap by mouth 3 times a day., Disp: 270 Cap, Rfl: 0  •  cetirizine (KLS ALLER-HAWA) 10 MG Tab, Take 40 mg by mouth every day., Disp: , Rfl:   •  liothyronine (CYTOMEL) 25 MCG Tab, Take 25 mcg by mouth every evening., Disp: , Rfl:   •  lisinopril (PRINIVIL) 10 MG Tab, Take 10 mg by mouth every day., Disp: , Rfl:   •  raNITidine (ZANTAC) 150 MG Tab, Take 300 mg by mouth 2 times a day., Disp: , Rfl:   •  AIMOVIG 70 MG/ML Solution Auto-injector, 1 mL by Injection route Q30 DAYS., Disp: , Rfl:   •  montelukast (SINGULAIR) 10 MG Tab, TAKE 1 TABLET BY MOUTH EVERY DAY, Disp: 90 Tab, Rfl: 3  •  estradiol (ESTRACE) 0.5 MG tablet, Take 0.5 mg by mouth every day., Disp: , Rfl:   •  SYNTHROID 75 MCG Tab, Take 75 mcg by mouth Every morning on an empty stomach., Disp: , Rfl:   •  potassium chloride SA (KDUR) 20 MEQ Tab CR, Take 1 Tab by mouth every day., Disp: 30 Tab, Rfl: 1  •  pantoprazole (PROTONIX) 40 MG Tablet Delayed Response, take 1 tablet by mouth twice daily, Disp: , Rfl: 2  •  gabapentin (NEURONTIN) 400 MG Cap, Take 1 Cap by mouth 3 times a day., Disp: 180 Cap, Rfl: 3  •  DULoxetine (CYMBALTA) 60 MG Cap DR Particles delayed-release capsule, Take 1 Cap by mouth every day., Disp: 90 Cap, Rfl: 3  •  magnesium oxide  "(MAG-OX) 400 MG Tab, Take 400 mg by mouth every evening., Disp: , Rfl:   •  Frovatriptan Succinate 2.5 MG Tab, TAKE 1 TABLET BY MOUTH EVERY DAY AS NEEDED, Disp: 10 Tab, Rfl: 3  •  potassium chloride SA (KDUR) 20 MEQ Tab CR, TAKE 1 TABLET BY MOUTH EVERY DAY, Disp: 90 Tab, Rfl: 3  •  albuterol 108 (90 Base) MCG/ACT Aero Soln inhalation aerosol, Inhale 2 Puffs by mouth every 6 hours as needed for Shortness of Breath., Disp: , Rfl:   •  furosemide (LASIX) 20 MG Tab, Take 10 mg by mouth 2 times a day., Disp: , Rfl:   •  tizanidine (ZANAFLEX) 4 MG Tab, Take 4 mg by mouth every 6 hours as needed., Disp: , Rfl:   •  ferrous sulfate 325 (65 Fe) MG tablet, Take 325 mg by mouth every day., Disp: , Rfl:   •  Coral Calcium 1000 (390 Ca) MG Tab, Take 1 Tab by mouth every day., Disp: , Rfl:   •  Cyanocobalamin (VITAMIN B-12) 1000 MCG Tab, Take 1,000 mcg by mouth every 48 hours., Disp: , Rfl:   •  Biotin 5000 MCG Cap, Take 1 Cap by mouth every day., Disp: , Rfl:   •  Cholecalciferol (VITAMIN D) 2000 units Cap, Take 1 Cap by mouth every day., Disp: , Rfl:   •  Probiotic Product (PROBIOTIC PO), Take 1 Tab by mouth every day., Disp: , Rfl:   •  Menaquinone-7 (VITAMIN K2) 100 MCG Cap, Take 1 Tab by mouth every day., Disp: , Rfl:   •  multivitamin (THERAGRAN) Tab, Take 1 Tab by mouth every day., Disp: , Rfl:   ALLERGIES:   Allergies   Allergen Reactions   • Bactrim [Sulfamethoxazole W-Trimethoprim] Shortness of Breath   • Bee Venom Anaphylaxis   • Contrast Media With Iodine [Iodine] Shortness of Breath   • Econazole Anaphylaxis   • Floxin [Ofloxacin] Anaphylaxis, Shortness of Breath and Swelling     Cause throat swelling and difficulty breathing   • Gadolinium Derivatives Hives and Swelling     Throat swelling   • Keflex Shortness of Breath   • Levaquin Shortness of Breath     anaphlyaxis   • Levofloxacin Shortness of Breath   • Morphine Anaphylaxis   • Naloxone Hives     \"hives, SOB\"   • Nitrofurantoin Shortness of Breath     ...and " "hives     • Norco [Apap-Fd&C Yellow #10 Al Tai-Hydrocodone] Shortness of Breath     ...and hives     • Oxycodone Shortness of Breath     ...and hives     • Penicillins Shortness of Breath     ...and hives     • Ancef [Cefazolin] Hives   • Bextra [Valdecoxib] Rash     \"Skin burn and peeling.\"   • Other Drug Hives     \"Enconazole nitrate.\" shortness of breath and hives   • Other Misc Unspecified     Adhesive tape: blisters, skin peels off   • Clindamycin      HIVES     • Tygacil [Tigecycline]      itching     SURGHX:   Past Surgical History:   Procedure Laterality Date   • SHOULDER DECOMPRESSION ARTHROSCOPIC Left 2/19/2019    Procedure: SHOULDER DECOMPRESSION ARTHROSCOPIC - SUBACROMIAL;  Surgeon: Camila Elkins M.D.;  Location: [facility];  Service: Orthopedics   • CLAVICLE DISTAL EXCISION Left 2/19/2019    Procedure: CLAVICLE DISTAL EXCISION;  Surgeon: Camila Elkins M.D.;  Location: [facility];  Service: Orthopedics   • SHOULDER ARTHROSCOPY W/ BICIPITAL TENODESIS REPAIR Left 2/19/2019    Procedure: SHOULDER ARTHROSCOPY W/ BICIPITAL TENODESIS REPAIR;  Surgeon: Camila Elkins M.D.;  Location: [facility];  Service: Orthopedics   • GASTROSCOPY N/A 2/7/2019    Procedure: GASTROSCOPY- DIALATION AND BIOPSY;  Surgeon: Hola Veliz M.D.;  Location[facility];  Service: Gastroenterology   • ABDOMINAL EXPLORATION  2002   • GASTRIC BYPASS LAPAROSCOPIC  1999   • HYSTERECTOMY, TOTAL ABDOMINAL  1995   • APPENDECTOMY  1974     SOCHX:  reports that she has quit smoking. She has never used smokeless tobacco. She reports that she drinks alcohol. She reports that she does not use drugs.  FH: Family history was reviewed, no pertinent findings to report   Objective:   /78   Pulse 84   Temp 36.7 °C (98.1 °F) (Temporal)   Resp 20   Ht 1.626 m (5' 4\")   Wt 81.6 kg (180 lb)   LMP 02/07/2016 (Within Months)   SpO2 96%   BMI 30.90 kg/m²   Physical Exam "   Constitutional: She appears well-developed and well-nourished.  Non-toxic appearance. No distress.   HENT:   Head: Normocephalic and atraumatic.   Right Ear: External ear normal. No mastoid tenderness. Tympanic membrane is erythematous.   Nose: Nose normal.   Mouth/Throat: Uvula is midline, oropharynx is clear and moist and mucous membranes are normal. No oral lesions.   Right EAC is erythematous and mildly edematous with scant purulent discharge.  There is a central perforation.  The TM is erythematous, thickened.  There appears to be purulent discharge within the inner ear.   Neck: Neck supple.   Pulmonary/Chest: Effort normal. No respiratory distress.   Lymphadenopathy:        Head (right side): Preauricular and posterior auricular adenopathy present.     She has cervical adenopathy.        Right cervical: Superficial cervical adenopathy present.   Neurological: She is alert.   Skin: Skin is warm, dry and intact. Capillary refill takes less than 2 seconds.   Diffuse maculopapular rash on patient's trunk.  Lesions raising size from 3 mm to 10 mm.  No vesicles.  No discharge.  Lesions are nontender to palpation.   Psychiatric: She has a normal mood and affect. Her speech is normal and behavior is normal. Judgment and thought content normal. Cognition and memory are normal.   Vitals reviewed.        Assessment/Plan:   1. Medication reaction, initial encounter  - triamcinolone (KENALOG) 0.1 % lotion; Apply 1 Units to affected area(s) 3 times a day.  Dispense: 1 Bottle; Refill: 0    2. Dermatitis  - triamcinolone (KENALOG) 0.1 % lotion; Apply 1 Units to affected area(s) 3 times a day.  Dispense: 1 Bottle; Refill: 0    3. Perforated ear drum, right    4. Recurrent acute suppurative otitis media of right ear without spontaneous rupture of tympanic membrane    1.  Rash is affecting only the cutaneous tissues.  No evidence of mucosal membrane involvement on physical exam.  Patient instructed to stop linezolid.  She may  apply topical corticosteroid to the affected areas until they resolve.    2.  Patient instructed to call [facility] this morning regards to status of anti-yeast topical medication.  I would also like her to call Dr. Butler today to inform him of her current symptoms and discuss most appropriate treatment options.  Patient has extensive medication allergies and infectious disease was involved in the decision-making with regards to previous antimicrobial therapy.  Patient states that there was only one topical antibiotic that has worked for her in the past but does not recall the name.  When she gets home she is instructed to contact clinic and I will call in this medication for her.     Differential diagnosis, natural history, supportive care, and indications for immediate follow-up discussed.
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
## 2019-05-03 ENCOUNTER — HOSPITAL ENCOUNTER (OUTPATIENT)
Dept: LAB | Facility: MEDICAL CENTER | Age: 54
End: 2019-05-03
Attending: INTERNAL MEDICINE
Payer: MEDICARE

## 2019-05-03 LAB
BASOPHILS # BLD AUTO: 0.2 % (ref 0–1.8)
BASOPHILS # BLD: 0.01 K/UL (ref 0–0.12)
EOSINOPHIL # BLD AUTO: 0.06 K/UL (ref 0–0.51)
EOSINOPHIL NFR BLD: 1.4 % (ref 0–6.9)
ERYTHROCYTE [DISTWIDTH] IN BLOOD BY AUTOMATED COUNT: 51.6 FL (ref 35.9–50)
HCT VFR BLD AUTO: 45.1 % (ref 37–47)
HGB BLD-MCNC: 14.1 G/DL (ref 12–16)
IMM GRANULOCYTES # BLD AUTO: 0.01 K/UL (ref 0–0.11)
IMM GRANULOCYTES NFR BLD AUTO: 0.2 % (ref 0–0.9)
LYMPHOCYTES # BLD AUTO: 1.23 K/UL (ref 1–4.8)
LYMPHOCYTES NFR BLD: 29.1 % (ref 22–41)
MCH RBC QN AUTO: 29.3 PG (ref 27–33)
MCHC RBC AUTO-ENTMCNC: 31.3 G/DL (ref 33.6–35)
MCV RBC AUTO: 93.8 FL (ref 81.4–97.8)
MONOCYTES # BLD AUTO: 0.52 K/UL (ref 0–0.85)
MONOCYTES NFR BLD AUTO: 12.3 % (ref 0–13.4)
NEUTROPHILS # BLD AUTO: 2.4 K/UL (ref 2–7.15)
NEUTROPHILS NFR BLD: 56.8 % (ref 44–72)
NRBC # BLD AUTO: 0 K/UL
NRBC BLD-RTO: 0 /100 WBC
PLATELET # BLD AUTO: 206 K/UL (ref 164–446)
PMV BLD AUTO: 11.3 FL (ref 9–12.9)
RBC # BLD AUTO: 4.81 M/UL (ref 4.2–5.4)
WBC # BLD AUTO: 4.2 K/UL (ref 4.8–10.8)

## 2019-05-03 PROCEDURE — 36415 COLL VENOUS BLD VENIPUNCTURE: CPT

## 2019-05-03 PROCEDURE — 81256 HFE GENE: CPT

## 2019-05-03 PROCEDURE — 85025 COMPLETE CBC W/AUTO DIFF WBC: CPT

## 2019-05-06 LAB
HFE GENE MUT ANL BLD/T: NORMAL
HFE P.C282Y BLD/T QL: NEGATIVE
HFE P.H63D BLD/T QL: NORMAL
HFE P.S65C BLD/T QL: NEGATIVE

## 2019-05-07 ENCOUNTER — OFFICE VISIT (OUTPATIENT)
Dept: INFECTIOUS DISEASES | Facility: MEDICAL CENTER | Age: 54
End: 2019-05-07
Payer: MEDICARE

## 2019-05-07 VITALS
DIASTOLIC BLOOD PRESSURE: 80 MMHG | OXYGEN SATURATION: 96 % | WEIGHT: 177.8 LBS | HEIGHT: 64 IN | TEMPERATURE: 97.4 F | BODY MASS INDEX: 30.35 KG/M2 | SYSTOLIC BLOOD PRESSURE: 132 MMHG | HEART RATE: 100 BPM

## 2019-05-07 DIAGNOSIS — R05.9 COUGH: ICD-10-CM

## 2019-05-07 DIAGNOSIS — Z88.9 MULTIPLE ALLERGIES: ICD-10-CM

## 2019-05-07 DIAGNOSIS — H66.90 CHRONIC OTITIS MEDIA, UNSPECIFIED OTITIS MEDIA TYPE: ICD-10-CM

## 2019-05-07 DIAGNOSIS — J31.0 CHRONIC RHINITIS: ICD-10-CM

## 2019-05-07 DIAGNOSIS — Z79.899 POLYPHARMACY: ICD-10-CM

## 2019-05-07 PROCEDURE — 99213 OFFICE O/P EST LOW 20 MIN: CPT | Performed by: INTERNAL MEDICINE

## 2019-05-07 RX ORDER — CLOTRIMAZOLE 1 G/ML
SOLUTION TOPICAL 2 TIMES DAILY
Status: ON HOLD | COMMUNITY
Start: 2019-05-03 | End: 2019-11-05

## 2019-05-07 RX ORDER — TRIAMCINOLONE ACETONIDE 1 MG/ML
LOTION TOPICAL 2 TIMES DAILY
COMMUNITY
Start: 2019-04-30 | End: 2019-10-30

## 2019-05-07 NOTE — PROGRESS NOTES
"Chief Complaint   Patient presents with   • Follow-Up     Subacute otitis media, unspecified otitis media type       Infectious Disease clinic follow-up:  Patient is a 53 y.o. female in the clinic today for ID FU appointment.   She was seen in consultation on 2/27/2019 for otitis media.Patient has been battling with chronic ear infections of the right ear for many years.  She has been experiencing yellow drainage from her right ear associated with the ear pain.  She has also been noticing green mucus nasal discharge.  She has a perforated eardrum and normally wears hearing aids but has not been able to wear them secondary to current infection.  She denies any associated fevers or chills.  She has been following up with Dr. Butler and a culture was done of her right ear on January 29, 2019 and grew MRSE which is resistant to clindamycin, Bactrim and doxycycline.  Patient has multiple antibiotic allergies including Bactrim. She was previously on clindamycin and doxycycline however due to the resistance these medications were discontinued.  She was started on p.o. Zyvox for 14-day course.  3/6/2019-patient has come back for a follow-up.  The discharge from the ear has stopped.  Still has some sinus discharge.  It is not green anymore it is clear.  Denies any fevers denies any chills.  Denies any nausea vomiting.  Does complain of some diarrhea.  Apparently there was a \"stomach bug\" going around in the house.  Overall feels much improved.  5/7/2019-patient has come back for follow-up. She started having drainage again from her ear. She started zyvox but broke out in a rash. She is allergic to all classes of antibiotics. She is also reporting a dry  cough since december and sinus congestion for 1 month. She also reports that the drainage from her ear had returned and the cultures were Candida. She has been following up with her ENT and she was prescribed topical drops which she has not taken yet. Denies any fevers at " "this time . Denies any N<V<D.  Primary Care Provider: Benson Ramirez M.D.     REVIEW OF SYSTEMS:    As documented in my HPI    ALLERGIES:    Allergies   Allergen Reactions   • Bactrim [Sulfamethoxazole W-Trimethoprim] Shortness of Breath   • Bee Venom Anaphylaxis   • Contrast Media With Iodine [Iodine] Shortness of Breath   • Econazole Anaphylaxis   • Floxin [Ofloxacin] Anaphylaxis, Shortness of Breath and Swelling     Cause throat swelling and difficulty breathing   • Gadolinium Derivatives Hives and Swelling     Throat swelling   • Keflex Shortness of Breath   • Levaquin Shortness of Breath     anaphlyaxis   • Levofloxacin Shortness of Breath   • Morphine Anaphylaxis   • Naloxone Hives     \"hives, SOB\"   • Nitrofurantoin Shortness of Breath     ...and hives     • Norco [Apap-Fd&C Yellow #10 Al Tai-Hydrocodone] Shortness of Breath     ...and hives     • Oxycodone Shortness of Breath     ...and hives     • Penicillins Shortness of Breath     ...and hives     • Ancef [Cefazolin] Hives   • Bextra [Valdecoxib] Rash     \"Skin burn and peeling.\"   • Linezolid Rash     Rash all over body   • Other Drug Hives     \"Enconazole nitrate.\" shortness of breath and hives   • Other Misc Unspecified     Adhesive tape: blisters, skin peels off   • Clindamycin      HIVES     • Tygacil [Tigecycline]      itching        PAST MEDICAL HISTORY:   Past Medical History:   Diagnosis Date   • AAA (abdominal aortic aneurysm) ([facility])    • Asthma    • Chronic pain    • Congestive heart failure ([facility])     Cardiologist, Dr. Carlson   • Fibromyalgia    • GERD (gastroesophageal reflux disease)    • Hypertension    • Migraine    • Osteoporosis    • Renal disorder     CKD   • Urticaria        PAST SURGICAL HISTORY:    Past Surgical History:   Procedure Laterality Date   • SHOULDER DECOMPRESSION ARTHROSCOPIC Left 2/19/2019    Procedure: SHOULDER DECOMPRESSION ARTHROSCOPIC - SUBACROMIAL;  Surgeon: Camila Elkins M.D.;  [facility]: [facility];  Service: Orthopedics   • CLAVICLE DISTAL EXCISION Left 2/19/2019    Procedure: CLAVICLE DISTAL EXCISION;  Surgeon: Camila Elkins M.D.;  Location: SURGERY [facility];  Service: Orthopedics   • SHOULDER ARTHROSCOPY W/ BICIPITAL TENODESIS REPAIR Left 2/19/2019    Procedure: SHOULDER ARTHROSCOPY W/ BICIPITAL TENODESIS REPAIR;  Surgeon: Camila Elkins M.D.;  Location: SURGERY [facility];  Service: Orthopedics   • GASTROSCOPY N/A 2/7/2019    Procedure: GASTROSCOPY- DIALATION AND BIOPSY;  Surgeon: Hola Veliz M.D.;  Location: SURGERY [facility];  Service: Gastroenterology   • ABDOMINAL EXPLORATION  2002   • GASTRIC BYPASS LAPAROSCOPIC  1999   • HYSTERECTOMY, TOTAL ABDOMINAL  1995   • APPENDECTOMY  1974        MEDICATIONS:   Current Outpatient Prescriptions   Medication Sig Dispense Refill   • clotrimazole (LOTRIMIN) 1 % Solution      • Diclofenac Sodium 1 % Gel      • triamcinolone (KENALOG) 0.1 % lotion      • Cyanocobalamin (B-12) 1000 MCG/ML Kit by Injection route.     • colesevelam (WELCHOL) 625 MG Tab      • pilocarpine (SALAGEN) 5 MG Tab      • Frovatriptan Succinate 2.5 MG Tab TAKE 1 TABLET BY MOUTH EVERY DAY AS NEEDED 10 Tab 3   • cevimeline (EVOXAC) 30 MG capsule Take 1 Cap by mouth 3 times a day. 270 Cap 0   • cetirizine (KLS ALLER-HAWA) 10 MG Tab Take 40 mg by mouth every day.     • furosemide (LASIX) 20 MG Tab Take 10 mg by mouth 2 times a day.     • liothyronine (CYTOMEL) 25 MCG Tab Take 25 mcg by mouth every evening.     • lisinopril (PRINIVIL) 10 MG Tab Take 10 mg by mouth every day.     • raNITidine (ZANTAC) 150 MG Tab Take 300 mg by mouth 2 times a day.     • AIMOVIG 70 MG/ML Solution Auto-injector 1 mL by Injection route Q30 DAYS.     • montelukast (SINGULAIR) 10 MG Tab TAKE 1 TABLET BY MOUTH EVERY DAY 90 Tab 3   • estradiol (ESTRACE) 0.5 MG tablet Take 0.5 mg by mouth every day.     • ferrous sulfate 325 (65 Fe) MG tablet Take 325 mg by mouth every day.     •  "SYNTHROID 75 MCG Tab Take 75 mcg by mouth Every morning on an empty stomach.     • potassium chloride SA (KDUR) 20 MEQ Tab CR Take 1 Tab by mouth every day. 30 Tab 1   • pantoprazole (PROTONIX) 40 MG Tablet Delayed Response take 1 tablet by mouth twice daily  2   • gabapentin (NEURONTIN) 400 MG Cap Take 1 Cap by mouth 3 times a day. 180 Cap 3   • DULoxetine (CYMBALTA) 60 MG Cap DR Particles delayed-release capsule Take 1 Cap by mouth every day. 90 Cap 3   • Coral Calcium 1000 (390 Ca) MG Tab Take 1 Tab by mouth every day.     • magnesium oxide (MAG-OX) 400 MG Tab Take 400 mg by mouth every evening.     • Biotin 5000 MCG Cap Take 1 Cap by mouth every day.     • Cholecalciferol (VITAMIN D) 2000 units Cap Take 1 Cap by mouth every day.     • Probiotic Product (PROBIOTIC PO) Take 1 Tab by mouth every day.     • Menaquinone-7 (VITAMIN K2) 100 MCG Cap Take 1 Tab by mouth every day.     • multivitamin (THERAGRAN) Tab Take 1 Tab by mouth every day.     • EPINEPHrine (EPIPEN) 0.3 MG/0.3ML Solution Auto-injector solution for injection      • albuterol 108 (90 Base) MCG/ACT Aero Soln inhalation aerosol Inhale 2 Puffs by mouth every 6 hours as needed for Shortness of Breath.     • tizanidine (ZANAFLEX) 4 MG Tab Take 4 mg by mouth every 6 hours as needed.     • Cyanocobalamin (VITAMIN B-12) 1000 MCG Tab Take 1,000 mcg by mouth every 48 hours.       No current facility-administered medications for this visit.         LABORATORY DATA: Culture MRSE     PHYSICAL EXAMINATION:PE:      /80 (BP Location: Left arm, Patient Position: Sitting, BP Cuff Size: Adult)   Pulse 100   Temp 36.3 °C (97.4 °F) (Temporal)   Ht 1.626 m (5' 4\")   Wt 80.6 kg (177 lb 12.8 oz)   LMP 02/07/2016 (Within Months)   SpO2 96%   BMI 30.52 kg/m²        Constitutional: patient is oriented to person, place, and time. appears well-developed and well-nourished. No distress  Eyes: Conjunctivae normal and EOM are normal. Pupils are equal, round, and " reactive to light.   ENT: Lips without lesions, good dentition, oropharynx is clear and moist.  Right ear has a perforation. Sinus tenderness on the maxillary area.  Neck: Trachea midline. Normal range of motion. Neck supple. No masses. no lymphadenopathy.  Cardiovascular: Normal rate, regular rhythm, normal heart sounds and intact distal pulses. No murmur, gallop, or friction rub. No edema.  Pulmonary/Chest: No respiratory distress. Unlabored respiratory effort, lungs clear to auscultation. No wheezes or rales.   Abdominal: Soft, non tender. BS + x 4. No masses or hepatosplenomegaly.   Musculoskeletal: Left arm in sling  Neurological:  alert and oriented to person, place, and time. No cranial nerve deficit. Coordination normal.   Skin: Skin is warm and dry. Good turgor. No rashes visable.  Psychiatric: anxious       ASSESSMENT:  1. Multiple allergies     2. Chronic rhinitis     3. Chronic otitis media, unspecified otitis media type     4. Cough     5. Polypharmacy          RECOMMENDATIONS:      Patient is allergic to almost all antibiotic classes. She has been seeing the immunologist as well. She was advised to finish her course of topical therapy which has been prescribed by ENT. In case she gets fevers she was advised to come to the ER. She may need desensitization.    As far as the cough is concerned she needs to discuss with her PMD. Lisinopril could  be causing the cough as well.     She is on multiple medications. She needs to discuss with her PMD regarding streamlining her medication list.    We will see her back in 2 weeks and she was advised to go to the ER if her symptoms worsen.    No Follow-up on file.

## 2019-05-13 ENCOUNTER — OFFICE VISIT (OUTPATIENT)
Dept: INTERNAL MEDICINE | Facility: MEDICAL CENTER | Age: 54
End: 2019-05-13
Payer: MEDICARE

## 2019-05-13 VITALS
DIASTOLIC BLOOD PRESSURE: 84 MMHG | SYSTOLIC BLOOD PRESSURE: 108 MMHG | BODY MASS INDEX: 30.75 KG/M2 | WEIGHT: 180.13 LBS | HEIGHT: 64 IN | TEMPERATURE: 98.1 F | OXYGEN SATURATION: 98 % | HEART RATE: 108 BPM

## 2019-05-13 DIAGNOSIS — Z23 NEED FOR VACCINATION: ICD-10-CM

## 2019-05-13 DIAGNOSIS — R05.3 CHRONIC COUGH: ICD-10-CM

## 2019-05-13 DIAGNOSIS — J31.0 CHRONIC RHINITIS: ICD-10-CM

## 2019-05-13 DIAGNOSIS — I10 ESSENTIAL HYPERTENSION: Chronic | ICD-10-CM

## 2019-05-13 DIAGNOSIS — N18.30 CKD (CHRONIC KIDNEY DISEASE), STAGE III (HCC): ICD-10-CM

## 2019-05-13 DIAGNOSIS — I77.810 AORTIC ROOT DILATATION (HCC): ICD-10-CM

## 2019-05-13 PROBLEM — R19.7 DIARRHEA: Status: RESOLVED | Noted: 2019-03-15 | Resolved: 2019-05-13

## 2019-05-13 PROBLEM — I50.9 CHF (CONGESTIVE HEART FAILURE) (HCC): Status: RESOLVED | Noted: 2018-10-11 | Resolved: 2019-05-13

## 2019-05-13 PROBLEM — R68.84 JAW PAIN: Status: RESOLVED | Noted: 2017-06-05 | Resolved: 2019-05-13

## 2019-05-13 PROBLEM — L50.8 CHRONIC URTICARIA: Status: RESOLVED | Noted: 2019-03-15 | Resolved: 2019-05-13

## 2019-05-13 PROBLEM — J45.991 COUGH VARIANT ASTHMA: Status: RESOLVED | Noted: 2017-03-21 | Resolved: 2019-05-13

## 2019-05-13 PROBLEM — I51.7 RIGHT HEART ENLARGEMENT: Status: RESOLVED | Noted: 2018-10-11 | Resolved: 2019-05-13

## 2019-05-13 PROCEDURE — 90714 TD VACC NO PRESV 7 YRS+ IM: CPT | Mod: GC | Performed by: INTERNAL MEDICINE

## 2019-05-13 PROCEDURE — 99213 OFFICE O/P EST LOW 20 MIN: CPT | Mod: GE,25 | Performed by: INTERNAL MEDICINE

## 2019-05-13 PROCEDURE — 90471 IMMUNIZATION ADMIN: CPT | Mod: GC | Performed by: INTERNAL MEDICINE

## 2019-05-13 RX ORDER — LOSARTAN POTASSIUM 25 MG/1
25 TABLET ORAL DAILY
Qty: 60 TAB | Refills: 1 | Status: SHIPPED | OUTPATIENT
Start: 2019-05-13 | End: 2019-05-13 | Stop reason: SDUPTHER

## 2019-05-13 RX ORDER — TIZANIDINE 4 MG/1
4 TABLET ORAL EVERY 6 HOURS PRN
Qty: 90 TAB | Refills: 3 | Status: SHIPPED | OUTPATIENT
Start: 2019-05-13 | End: 2019-05-13 | Stop reason: SDUPTHER

## 2019-05-13 NOTE — PROGRESS NOTES
Established Patient    Donna presents today with the following:    CC:   Chief Complaint   Patient presents with   • Cough     Pt saw the ID. They said it may be from B/P medication   • Medication Management     B/P Medication         HPI:   This is a 53-year-old female with history of CKD, aortic root and attention here for follow-up,  Patient has had a history of asthma (no use medication in several months), chronic cough which she thinks is related to lisinopril.  Denies wheezing, shortness of breath.  No chest pain    Patient currently admits to nasal congestion.  She has history of multiple allergies, and has a pending appointment to see allergist immunologist at the end of the amount.    Patient blood pressure is well controlled on lisinopril.    ROS:  Constitutional: No fever, no chills,  Respiratory: , no hemoptysis,  Cardiovascular: No chest pain, no palpitations, no orthopnea, no PND, no lower extremity swelling  GI  : No nausea, no vomiting, no abdominal pain, no diarrhea, no constipation, no hematochezia  : No dysuria, no urgency, no hesitancy, no nocturia, no frequency, no hematuria  Endocrine: No polyuria, no polydipsia,  Hematology: No easy bruisability, no bleeding  Musculoskeletal: No joint swelling, no joint redness,  Neuro: No seizures, no numbness, no tingling sensation, no extremity weakness, no change in vision, no hearing impairment  Psychiatry: no depression, no suicidal ideation         Patient Active Problem List    Diagnosis Date Noted   • Chronic cough 05/13/2019   • Chronic rhinitis 05/13/2019   • Need for vaccination 03/15/2019   • Rash on lips 03/15/2019   • Tear of left rotator cuff s/p repair 03/15/2019   • CKD (chronic kidney disease) stage 3, GFR 30-59 ml/min ([facility]) 03/15/2019   • Class 1 obesity in adult 12/03/2018   • Hx of gastric bypass in 2009 Decmber  12/03/2018   • CKD (chronic kidney disease), stage III ([facility]) 10/25/2018   • Gastroesophageal reflux disease without  esophagitis 10/11/2018   • Dry mouth in setting of trauma 2001 10/11/2018   • Muscle spasm 10/11/2018   • Fibromyalgia 10/11/2018   • Multiple allergies 10/11/2018   • Chronic migraine without aura, not intractable 10/11/2018   • Aortic root dilatation (HCC) 10/11/2018   • Essential hypertension 09/26/2018   • Pain in joint of left shoulder 03/26/2018   • Closed fracture of right wrist 03/26/2018   • Closed fracture of distal end of right radius 01/24/2018   • Depressive disorder 01/16/2018   • 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 03/07/2017   • Recurrent bacterial infection 03/07/2017   • Osteoporosis 02/22/2017   • Esophageal stricture 08/01/2016   • Advance directive on file 05/16/2016   • Central perforation of tympanic membrane of right ear 10/26/2015   • Seizure (HCC) 09/29/2015   • Takotsubo syndrome 09/29/2015   • History of hysterectomy for benign disease 05/15/2015   • Shoulder impingement 12/30/2014   • Osteoarthritis of right hip 05/14/2014   • History of abnormal Pap smear 02/11/2014   • IBS (irritable bowel syndrome) 09/17/2013   • Asthma 10/26/2012   • Anemia, iron deficiency, inadequate dietary intake 03/19/2009       Current Outpatient Prescriptions   Medication Sig Dispense Refill   • losartan (COZAAR) 25 MG Tab Take 1 Tab by mouth every day. 60 Tab 1   • tizanidine (ZANAFLEX) 4 MG Tab Take 1 Tab by mouth every 6 hours as needed. 90 Tab 3   • clotrimazole (LOTRIMIN) 1 % Solution Apply  to affected area(s) 2 times a day. ankle     • Diclofenac Sodium 1 % Gel as needed.     • triamcinolone (KENALOG) 0.1 % lotion Apply  to affected area(s) 2 Times a Day.     • Cyanocobalamin (B-12) 1000 MCG/ML Kit by Injection route every 7 days.     • colesevelam (WELCHOL) 625 MG Tab Take  by mouth every day.     • EPINEPHrine (EPIPEN) 0.3 MG/0.3ML Solution Auto-injector solution for injection      • pilocarpine (SALAGEN) 5 MG Tab Take 5 mg by mouth 2 Times a Day.     • Frovatriptan Succinate 2.5 MG Tab TAKE 1 TABLET  BY MOUTH EVERY DAY AS NEEDED 10 Tab 3   • cevimeline (EVOXAC) 30 MG capsule Take 1 Cap by mouth 3 times a day. 270 Cap 0   • albuterol 108 (90 Base) MCG/ACT Aero Soln inhalation aerosol Inhale 2 Puffs by mouth every 6 hours as needed for Shortness of Breath.     • cetirizine (KLS ALLER-HAWA) 10 MG Tab Take 40 mg by mouth every day.     • furosemide (LASIX) 20 MG Tab Take 10 mg by mouth 2 times a day.     • liothyronine (CYTOMEL) 25 MCG Tab Take 25 mcg by mouth every evening.     • raNITidine (ZANTAC) 150 MG Tab Take 300 mg by mouth 2 times a day.     • AIMOVIG 70 MG/ML Solution Auto-injector 1 mL by Injection route Q30 DAYS.     • montelukast (SINGULAIR) 10 MG Tab TAKE 1 TABLET BY MOUTH EVERY DAY 90 Tab 3   • estradiol (ESTRACE) 0.5 MG tablet Take 0.5 mg by mouth every day.     • ferrous sulfate 325 (65 Fe) MG tablet Take 325 mg by mouth every day.     • SYNTHROID 75 MCG Tab Take 75 mcg by mouth Every morning on an empty stomach.     • potassium chloride SA (KDUR) 20 MEQ Tab CR Take 1 Tab by mouth every day. 30 Tab 1   • pantoprazole (PROTONIX) 40 MG Tablet Delayed Response take 1 tablet by mouth twice daily  2   • gabapentin (NEURONTIN) 400 MG Cap Take 1 Cap by mouth 3 times a day. 180 Cap 3   • DULoxetine (CYMBALTA) 60 MG Cap DR Particles delayed-release capsule Take 1 Cap by mouth every day. 90 Cap 3   • Coral Calcium 1000 (390 Ca) MG Tab Take 1 Tab by mouth every day.     • magnesium oxide (MAG-OX) 400 MG Tab Take 400 mg by mouth every evening.     • Biotin 5000 MCG Cap Take 1 Cap by mouth every day.     • Cholecalciferol (VITAMIN D) 2000 units Cap Take 1 Cap by mouth every day.     • Probiotic Product (PROBIOTIC PO) Take 1 Tab by mouth every day.     • Menaquinone-7 (VITAMIN K2) 100 MCG Cap Take 1 Tab by mouth every day.     • multivitamin (THERAGRAN) Tab Take 1 Tab by mouth every day.       No current facility-administered medications for this visit.          /84 (BP Location: Left arm, Patient Position:  "Sitting, BP Cuff Size: Adult)   Pulse (!) 108   Temp 36.7 °C (98.1 °F) (Temporal)   Ht 1.626 m (5' 4\")   Wt 81.7 kg (180 lb 2 oz)   LMP 02/07/2016 (Within Months)   SpO2 98%   BMI 30.92 kg/m²     Physical Exam   General: Middle-age female,,  not in distress,   HEENT: Normocephalic, atraumatic, no jaundice or scleral icterus, no pallor, No cervical lymphadenopathy, no thyromegaly,  No tonsillar exudate, no throat erythyema,  PERLL, EOMI,  Nose: Enlarged nasal turbinates bilaterally.  Respiratory:lungs clear to auscultation b/l, breath sounds vesicular, air entry adequate b/l,no wheezing, rales or crackles  Cardiac:Regular, rate and rhythm, , S1/S2 present, no M/R/G  GI: abdomen non distended, soft, non tender, no organomegaly, bowel sounds normoactive  Neuro: AAOX4, CN II-XII intact, sensation intact, strength 5/5 in all extremities, Gait is normal,   no nystagmus  Psychiatry: Good judgment, good mood  Msk/Extremities: radial, dorsalis pedis pulses 2+b/l, no joint swelling or redness, ROM intact, no lower  extremity edema  Skin: No rash    Note: I have reviewed all pertinent labs and diagnostic tests associated with this visit with specific comments listed under the assessment and plan below    Assessment and Plan    1. Chronic cough, likely in setting of lisinopril use versus asthma related  Cough nonproductive  Lung examination clear,  - We will stop lisinopril, and prescribed losartan for blood pressure management    2. Chronic allergic rhinitis  Complains of nasal congestion  Exam showed enlarged nasal turbinates  -Flonase as needed  - Follow-up allergist immunologist appointment    3. Essential hypertension  Blood pressure well controlled  Stop lisinopril  - Started patient on losartan 25 mg daily    4. Aortic root dilatation (HCC)  Last cardiac echo on 4/5 showed aortic root dilatation of 4.1 cm  No evidence of heart failure, with ejection fraction of 75%  - Repeat echo in 12 months to monitor aortic " root dilatation  -Patient advised to continue to stay off smoking    5. CKD (chronic kidney disease), stage III (HCC)  Stable, advised to avoid NSAIDs, and keep hydrated    6. Need for vaccination  Patient was given Td vaccine  Patient advised to visit pharmacy for Shingrix vaccination    Followup: Return in about 6 months (around 11/13/2019).      Signed by: Benson Ramirez M.D.

## 2019-05-15 ENCOUNTER — HOSPITAL ENCOUNTER (OUTPATIENT)
Dept: LAB | Facility: MEDICAL CENTER | Age: 54
End: 2019-05-15
Attending: OTOLARYNGOLOGY
Payer: MEDICARE

## 2019-05-15 PROCEDURE — 87070 CULTURE OTHR SPECIMN AEROBIC: CPT

## 2019-05-15 PROCEDURE — 87205 SMEAR GRAM STAIN: CPT

## 2019-05-15 PROCEDURE — 87075 CULTR BACTERIA EXCEPT BLOOD: CPT

## 2019-05-16 LAB
GRAM STN SPEC: NORMAL
SIGNIFICANT IND 70042: NORMAL
SITE SITE: NORMAL
SOURCE SOURCE: NORMAL

## 2019-05-18 LAB
BACTERIA WND AEROBE CULT: NORMAL
GRAM STN SPEC: NORMAL
SIGNIFICANT IND 70042: NORMAL
SITE SITE: NORMAL
SOURCE SOURCE: NORMAL

## 2019-05-24 ENCOUNTER — OUTPATIENT INFUSION SERVICES (OUTPATIENT)
Dept: ONCOLOGY | Facility: MEDICAL CENTER | Age: 54
End: 2019-05-24
Attending: INTERNAL MEDICINE
Payer: MEDICARE

## 2019-05-24 VITALS
HEART RATE: 80 BPM | DIASTOLIC BLOOD PRESSURE: 89 MMHG | BODY MASS INDEX: 31.5 KG/M2 | HEIGHT: 64 IN | WEIGHT: 184.53 LBS | SYSTOLIC BLOOD PRESSURE: 135 MMHG | TEMPERATURE: 97 F | RESPIRATION RATE: 18 BRPM | OXYGEN SATURATION: 99 %

## 2019-05-24 DIAGNOSIS — E23.0 PANHYPOPITUITARISM (HCC): ICD-10-CM

## 2019-05-24 PROCEDURE — 306780 HCHG STAT FOR TRANSFUSION PER CASE

## 2019-05-24 PROCEDURE — 700101 HCHG RX REV CODE 250: Performed by: INTERNAL MEDICINE

## 2019-05-24 PROCEDURE — 700102 HCHG RX REV CODE 250 W/ 637 OVERRIDE(OP): Performed by: INTERNAL MEDICINE

## 2019-05-24 PROCEDURE — 83003 ASSAY GROWTH HORMONE (HGH): CPT

## 2019-05-24 PROCEDURE — 96365 THER/PROPH/DIAG IV INF INIT: CPT

## 2019-05-24 PROCEDURE — A9270 NON-COVERED ITEM OR SERVICE: HCPCS | Performed by: INTERNAL MEDICINE

## 2019-05-24 PROCEDURE — 36415 COLL VENOUS BLD VENIPUNCTURE: CPT

## 2019-05-24 RX ORDER — CLONIDINE HYDROCHLORIDE 0.3 MG/1
0.3 TABLET ORAL ONCE
Status: COMPLETED | OUTPATIENT
Start: 2019-05-24 | End: 2019-05-24

## 2019-05-24 RX ADMIN — ARGININE HYDROCHLORIDE 30 G: 10 INJECTION, SOLUTION INTRAVENOUS at 08:20

## 2019-05-24 RX ADMIN — CLONIDINE HYDROCHLORIDE 0.3 MG: 0.3 TABLET ORAL at 10:50

## 2019-05-24 NOTE — PROGRESS NOTES
"Pt is here for her scheduled growth hormone (GH) stimulation test. Pt reports having been been NPO past 10h drinking only water. Education provided; questions answered. Baseline GH drawn followed by 30min Arginine IV. GH levels drawn at 0\", 30\", 60\", 90\", and 120\" after infusion followed by Clonidine PO (BP checked prior clonidine PO - see EPIC ) and 30\" and 60\" post-clonidine GH level draws. Procedure well tolerated. Results faxed to requesting MD. Pt will f/u with her MD regarding the future plan of care.  "

## 2019-05-25 LAB
GHRH SERPL-MCNC: 0.08 NG/ML (ref 0.05–8)
GHRH SERPL-MCNC: 0.14 NG/ML (ref 0.05–8)
GHRH SERPL-MCNC: 0.23 NG/ML (ref 0.05–8)
GHRH SERPL-MCNC: 0.39 NG/ML (ref 0.05–8)
GHRH SERPL-MCNC: 1.19 NG/ML (ref 0.05–8)
GHRH SERPL-MCNC: 1.31 NG/ML (ref 0.05–8)
GHRH SERPL-MCNC: 2.68 NG/ML (ref 0.05–8)
GHRH SERPL-MCNC: <0.05 NG/ML (ref 0.05–8)

## 2019-05-29 ENCOUNTER — APPOINTMENT (RX ONLY)
Dept: URBAN - METROPOLITAN AREA CLINIC 4 | Facility: CLINIC | Age: 54
Setting detail: DERMATOLOGY
End: 2019-05-29

## 2019-05-29 DIAGNOSIS — L82.1 OTHER SEBORRHEIC KERATOSIS: ICD-10-CM

## 2019-05-29 DIAGNOSIS — L56.8 OTHER SPECIFIED ACUTE SKIN CHANGES DUE TO ULTRAVIOLET RADIATION: ICD-10-CM

## 2019-05-29 DIAGNOSIS — L56.5 DISSEMINATED SUPERFICIAL ACTINIC POROKERATOSIS (DSAP): ICD-10-CM

## 2019-05-29 PROBLEM — K21.9 GASTRO-ESOPHAGEAL REFLUX DISEASE WITHOUT ESOPHAGITIS: Status: ACTIVE | Noted: 2019-05-29

## 2019-05-29 PROBLEM — E03.9 HYPOTHYROIDISM, UNSPECIFIED: Status: ACTIVE | Noted: 2019-05-29

## 2019-05-29 PROBLEM — I10 ESSENTIAL (PRIMARY) HYPERTENSION: Status: ACTIVE | Noted: 2019-05-29

## 2019-05-29 PROCEDURE — ? LIQUID NITROGEN

## 2019-05-29 PROCEDURE — ? ADDITIONAL NOTES

## 2019-05-29 PROCEDURE — 17000 DESTRUCT PREMALG LESION: CPT

## 2019-05-29 PROCEDURE — ? CRYODESTRUCTION

## 2019-05-29 PROCEDURE — ? COUNSELING

## 2019-05-29 PROCEDURE — 99202 OFFICE O/P NEW SF 15 MIN: CPT | Mod: 25

## 2019-05-29 ASSESSMENT — LOCATION SIMPLE DESCRIPTION DERM
LOCATION SIMPLE: LEFT PRETIBIAL REGION
LOCATION SIMPLE: LEFT LIP
LOCATION SIMPLE: RIGHT HAND

## 2019-05-29 ASSESSMENT — LOCATION DETAILED DESCRIPTION DERM
LOCATION DETAILED: RIGHT RADIAL DORSAL HAND
LOCATION DETAILED: LEFT INFERIOR VERMILION LIP
LOCATION DETAILED: LEFT DISTAL PRETIBIAL REGION

## 2019-05-29 ASSESSMENT — LOCATION ZONE DERM
LOCATION ZONE: LEG
LOCATION ZONE: LIP
LOCATION ZONE: HAND

## 2019-05-29 NOTE — PROCEDURE: ADDITIONAL NOTES
Additional Notes: Lesion of concern on the L hand is clinically consistent with an SK.
Detail Level: Detailed

## 2019-05-29 NOTE — PROCEDURE: CRYODESTRUCTION
Consent was obtained from the patient. The risks, benefits and alternatives to therapy were discussed in detail. Specifically, the risks of infection, scarring, bleeding, prolonged wound healing, nerve injury, incomplete removal, allergy to anesthesia and recurrence were addressed. Alternatives to ED&C, such as: surgical removal and XRT were also discussed.  Prior to the procedure, the treatment site was clearly identified and confirmed by the patient. All components of Universal Protocol/PAUSE Rule completed.
Detail Level: Detailed
Bill For Surgical Tray: no
Additional Information: (Optional): A pressure dressing was applied.  The patient received detailed post-op instructions.
Anesthesia Type: 1% lidocaine with epinephrine
Post-Care Instructions: I reviewed with the patient in detail post-care instructions. Patient is to keep the area dry for 48 hours, and not to engage in any swimming until the area is healed. Should the patient develop any fevers, chills, bleeding, severe pain patient will contact the office immediately.
Skin Preparation: Alcohol
Size Of Lesion After Cryodestruction: 1.1

## 2019-06-07 ENCOUNTER — HOSPITAL ENCOUNTER (OUTPATIENT)
Dept: LAB | Facility: MEDICAL CENTER | Age: 54
End: 2019-06-07
Attending: INTERNAL MEDICINE
Payer: MEDICARE

## 2019-06-07 LAB
ALBUMIN SERPL BCP-MCNC: 4.6 G/DL (ref 3.2–4.9)
ALBUMIN/GLOB SERPL: 1.5 G/DL
ALP SERPL-CCNC: 87 U/L (ref 30–99)
ALT SERPL-CCNC: 57 U/L (ref 2–50)
ANION GAP SERPL CALC-SCNC: 10 MMOL/L (ref 0–11.9)
AST SERPL-CCNC: 51 U/L (ref 12–45)
BILIRUB SERPL-MCNC: 0.7 MG/DL (ref 0.1–1.5)
BUN SERPL-MCNC: 14 MG/DL (ref 8–22)
CALCIUM SERPL-MCNC: 9.7 MG/DL (ref 8.5–10.5)
CHLORIDE SERPL-SCNC: 101 MMOL/L (ref 96–112)
CO2 SERPL-SCNC: 26 MMOL/L (ref 20–33)
CREAT SERPL-MCNC: 1.23 MG/DL (ref 0.5–1.4)
FASTING STATUS PATIENT QL REPORTED: NORMAL
GLOBULIN SER CALC-MCNC: 3 G/DL (ref 1.9–3.5)
GLUCOSE SERPL-MCNC: 106 MG/DL (ref 65–99)
POTASSIUM SERPL-SCNC: 3.7 MMOL/L (ref 3.6–5.5)
PROT SERPL-MCNC: 7.6 G/DL (ref 6–8.2)
SODIUM SERPL-SCNC: 137 MMOL/L (ref 135–145)

## 2019-06-07 PROCEDURE — 83525 ASSAY OF INSULIN: CPT

## 2019-06-07 PROCEDURE — 80053 COMPREHEN METABOLIC PANEL: CPT

## 2019-06-08 DIAGNOSIS — I50.30 DIASTOLIC CONGESTIVE HEART FAILURE, UNSPECIFIED HF CHRONICITY (HCC): ICD-10-CM

## 2019-06-08 LAB
FASTING STATUS PATIENT QL REPORTED: NORMAL
INSULIN P FAST SERPL-ACNC: 11 UIU/ML (ref 3–19)

## 2019-06-10 ENCOUNTER — HOSPITAL ENCOUNTER (EMERGENCY)
Facility: MEDICAL CENTER | Age: 54
End: 2019-06-10
Attending: EMERGENCY MEDICINE
Payer: MEDICARE

## 2019-06-10 ENCOUNTER — APPOINTMENT (OUTPATIENT)
Dept: RADIOLOGY | Facility: MEDICAL CENTER | Age: 54
End: 2019-06-10
Attending: EMERGENCY MEDICINE
Payer: MEDICARE

## 2019-06-10 VITALS
SYSTOLIC BLOOD PRESSURE: 204 MMHG | TEMPERATURE: 99.1 F | HEIGHT: 64 IN | BODY MASS INDEX: 31.47 KG/M2 | HEART RATE: 92 BPM | DIASTOLIC BLOOD PRESSURE: 140 MMHG | RESPIRATION RATE: 19 BRPM | OXYGEN SATURATION: 96 % | WEIGHT: 184.3 LBS

## 2019-06-10 DIAGNOSIS — G43.009 MIGRAINE WITHOUT AURA AND WITHOUT STATUS MIGRAINOSUS, NOT INTRACTABLE: ICD-10-CM

## 2019-06-10 DIAGNOSIS — I10 UNCONTROLLED HYPERTENSION: ICD-10-CM

## 2019-06-10 LAB
ANION GAP SERPL CALC-SCNC: 10 MMOL/L (ref 0–11.9)
BUN SERPL-MCNC: 16 MG/DL (ref 8–22)
CALCIUM SERPL-MCNC: 8.5 MG/DL (ref 8.4–10.2)
CHLORIDE SERPL-SCNC: 104 MMOL/L (ref 96–112)
CO2 SERPL-SCNC: 25 MMOL/L (ref 20–33)
CREAT SERPL-MCNC: 1.12 MG/DL (ref 0.5–1.4)
GLUCOSE SERPL-MCNC: 111 MG/DL (ref 65–99)
POTASSIUM SERPL-SCNC: 3.9 MMOL/L (ref 3.6–5.5)
SODIUM SERPL-SCNC: 139 MMOL/L (ref 135–145)

## 2019-06-10 PROCEDURE — 700111 HCHG RX REV CODE 636 W/ 250 OVERRIDE (IP): Performed by: EMERGENCY MEDICINE

## 2019-06-10 PROCEDURE — 70450 CT HEAD/BRAIN W/O DYE: CPT

## 2019-06-10 PROCEDURE — 96367 TX/PROPH/DG ADDL SEQ IV INF: CPT

## 2019-06-10 PROCEDURE — 700105 HCHG RX REV CODE 258: Performed by: EMERGENCY MEDICINE

## 2019-06-10 PROCEDURE — 36415 COLL VENOUS BLD VENIPUNCTURE: CPT

## 2019-06-10 PROCEDURE — 96365 THER/PROPH/DIAG IV INF INIT: CPT

## 2019-06-10 PROCEDURE — 700102 HCHG RX REV CODE 250 W/ 637 OVERRIDE(OP): Performed by: EMERGENCY MEDICINE

## 2019-06-10 PROCEDURE — 99284 EMERGENCY DEPT VISIT MOD MDM: CPT

## 2019-06-10 PROCEDURE — 96375 TX/PRO/DX INJ NEW DRUG ADDON: CPT

## 2019-06-10 PROCEDURE — A9270 NON-COVERED ITEM OR SERVICE: HCPCS | Performed by: EMERGENCY MEDICINE

## 2019-06-10 PROCEDURE — 80048 BASIC METABOLIC PNL TOTAL CA: CPT

## 2019-06-10 PROCEDURE — 96366 THER/PROPH/DIAG IV INF ADDON: CPT

## 2019-06-10 RX ORDER — DIPHENHYDRAMINE HCL 25 MG
25 TABLET ORAL ONCE
Status: COMPLETED | OUTPATIENT
Start: 2019-06-10 | End: 2019-06-10

## 2019-06-10 RX ORDER — VALPROATE SODIUM 100 MG/ML
500 INJECTION, SOLUTION INTRAVENOUS ONCE
Status: COMPLETED | OUTPATIENT
Start: 2019-06-10 | End: 2019-06-10

## 2019-06-10 RX ORDER — HYDRALAZINE HYDROCHLORIDE 20 MG/ML
10 INJECTION INTRAMUSCULAR; INTRAVENOUS ONCE
Status: DISCONTINUED | OUTPATIENT
Start: 2019-06-10 | End: 2019-06-10

## 2019-06-10 RX ORDER — CLONIDINE HYDROCHLORIDE 0.1 MG/1
0.2 TABLET ORAL ONCE
Status: COMPLETED | OUTPATIENT
Start: 2019-06-10 | End: 2019-06-10

## 2019-06-10 RX ORDER — SODIUM CHLORIDE 9 MG/ML
1000 INJECTION, SOLUTION INTRAVENOUS ONCE
Status: COMPLETED | OUTPATIENT
Start: 2019-06-10 | End: 2019-06-10

## 2019-06-10 RX ORDER — MAGNESIUM SULFATE HEPTAHYDRATE 40 MG/ML
2 INJECTION, SOLUTION INTRAVENOUS ONCE
Status: COMPLETED | OUTPATIENT
Start: 2019-06-10 | End: 2019-06-10

## 2019-06-10 RX ORDER — FUROSEMIDE 20 MG/1
10 TABLET ORAL 2 TIMES DAILY
Qty: 90 TAB | Refills: 1 | Status: ON HOLD | OUTPATIENT
Start: 2019-06-10 | End: 2020-03-17

## 2019-06-10 RX ORDER — KETOROLAC TROMETHAMINE 30 MG/ML
60 INJECTION, SOLUTION INTRAMUSCULAR; INTRAVENOUS ONCE
Status: COMPLETED | OUTPATIENT
Start: 2019-06-10 | End: 2019-06-10

## 2019-06-10 RX ADMIN — MAGNESIUM SULFATE HEPTAHYDRATE 2 G: 40 INJECTION, SOLUTION INTRAVENOUS at 18:16

## 2019-06-10 RX ADMIN — CLONIDINE HYDROCHLORIDE 0.2 MG: 0.1 TABLET ORAL at 18:19

## 2019-06-10 RX ADMIN — VALPROATE SODIUM 500 MG: 100 INJECTION INTRAVENOUS at 17:09

## 2019-06-10 RX ADMIN — PROCHLORPERAZINE EDISYLATE 10 MG: 5 INJECTION INTRAMUSCULAR; INTRAVENOUS at 17:06

## 2019-06-10 RX ADMIN — KETOROLAC TROMETHAMINE 60 MG: 30 INJECTION, SOLUTION INTRAMUSCULAR; INTRAVENOUS at 17:05

## 2019-06-10 RX ADMIN — SODIUM CHLORIDE 1000 ML: 9 INJECTION, SOLUTION INTRAVENOUS at 17:03

## 2019-06-10 RX ADMIN — DIPHENHYDRAMINE HCL 25 MG: 25 TABLET ORAL at 17:03

## 2019-06-10 NOTE — ED PROVIDER NOTES
ED Provider Note    CHIEF COMPLAINT  Chief Complaint   Patient presents with   • Migraine     has had a migraine since Dr did injections in her neck  Diarrhea and possible C Diff       HPI  Donna Isaac is a 53 y.o. female who presents with a migraine headache since June 6 when she got midline steroid injections by Dr. micaela Melendez for migraine headaches.  She states this is not an unusual headache for her, but it is odd because of the location because she usually gets migraines on the right occipital area.  She has had nausea and has been vomiting.  She is been unable to keep her medications down.  She denies fevers.  She has had occasional diarrhea which is unusual for her.  She is currently doing gentamicin nasal washes for sinusitis.  No recent rashes.    REVIEW OF SYSTEMS  See HPI for further details.  No fever, rashes.  All other systems are negative    PAST MEDICAL HISTORY  Past Medical History:   Diagnosis Date   • AAA (abdominal aortic aneurysm) ([facility])    • Asthma    • Chronic pain    • Congestive heart failure ([facility])     Cardiologist, Dr. Carlson   • Fibromyalgia    • GERD (gastroesophageal reflux disease)    • Hypertension    • Migraine    • Osteoporosis    • Renal disorder     CKD   • Urticaria        FAMILY HISTORY  Family History   Problem Relation Age of Onset   • Heart Disease Mother    • Diabetes Mother    • Heart Failure Mother    • Hypertension Mother    • Hypertension Father    • Heart Disease Brother    • Heart Attack Brother    • Hypertension Brother        SOCIAL HISTORY  Social History     Social History   • Marital status:      Spouse name: N/A   • Number of children: N/A   • Years of education: N/A     Social History Main Topics   • Smoking status: Former Smoker   • Smokeless tobacco: Never Used   • Alcohol use Yes      Comment: 1-2 /month    • Drug use: No   • Sexual activity: Not on file     Other Topics Concern   • Not on file     Social History Narrative   • No narrative  on file       SURGICAL HISTORY  Past Surgical History:   Procedure Laterality Date   • SHOULDER DECOMPRESSION ARTHROSCOPIC Left 2/19/2019    Procedure: SHOULDER DECOMPRESSION ARTHROSCOPIC - SUBACROMIAL;  Surgeon: Camila Elkins M.D.;  Location: SURGERY [facility];  Service: Orthopedics   • CLAVICLE DISTAL EXCISION Left 2/19/2019    Procedure: CLAVICLE DISTAL EXCISION;  Surgeon: Camila Elkins M.D.;  Location: SURGERY [facility];  Service: Orthopedics   • SHOULDER ARTHROSCOPY W/ BICIPITAL TENODESIS REPAIR Left 2/19/2019    Procedure: SHOULDER ARTHROSCOPY W/ BICIPITAL TENODESIS REPAIR;  Surgeon: Camila Elkins M.D.;  Location: SURGERY [facility];  Service: Orthopedics   • GASTROSCOPY N/A 2/7/2019    Procedure: GASTROSCOPY- DIALATION AND BIOPSY;  Surgeon: Hola Veliz M.D.;  Location: [facility];  Service: Gastroenterology   • ABDOMINAL EXPLORATION  2002   • GASTRIC BYPASS LAPAROSCOPIC  1999   • HYSTERECTOMY, TOTAL ABDOMINAL  1995   • APPENDECTOMY  1974       CURRENT MEDICATIONS    Current Outpatient Prescriptions:   •  furosemide (LASIX) 20 MG Tab, TAKE 0.5 TABS BY MOUTH 2 TIMES A DAY., Disp: 90 Tab, Rfl: 1  •  losartan (COZAAR) 25 MG Tab, TAKE 1 TABLET BY MOUTH EVERY DAY, Disp: 90 Tab, Rfl: 3  •  tizanidine (ZANAFLEX) 4 MG Tab, TAKE 1 TABLET BY MOUTH EVERY 6 HOURS AS NEEDED, Disp: 180 Tab, Rfl: 3  •  clotrimazole (LOTRIMIN) 1 % Solution, Apply  to affected area(s) 2 times a day. ankle, Disp: , Rfl:   •  Diclofenac Sodium 1 % Gel, as needed., Disp: , Rfl:   •  triamcinolone (KENALOG) 0.1 % lotion, Apply  to affected area(s) 2 Times a Day., Disp: , Rfl:   •  Cyanocobalamin (B-12) 1000 MCG/ML Kit, by Injection route every 7 days., Disp: , Rfl:   •  colesevelam (WELCHOL) 625 MG Tab, Take  by mouth every day., Disp: , Rfl:   •  EPINEPHrine (EPIPEN) 0.3 MG/0.3ML Solution Auto-injector solution for injection, , Disp: , Rfl:   •  pilocarpine (SALAGEN) 5 MG Tab, Take 5 mg by  mouth 2 Times a Day., Disp: , Rfl:   •  Frovatriptan Succinate 2.5 MG Tab, TAKE 1 TABLET BY MOUTH EVERY DAY AS NEEDED, Disp: 10 Tab, Rfl: 3  •  cevimeline (EVOXAC) 30 MG capsule, Take 1 Cap by mouth 3 times a day., Disp: 270 Cap, Rfl: 0  •  albuterol 108 (90 Base) MCG/ACT Aero Soln inhalation aerosol, Inhale 2 Puffs by mouth every 6 hours as needed for Shortness of Breath., Disp: , Rfl:   •  cetirizine (KLS ALLER-HAWA) 10 MG Tab, Take 40 mg by mouth every day., Disp: , Rfl:   •  furosemide (LASIX) 20 MG Tab, Take 10 mg by mouth 2 times a day., Disp: , Rfl:   •  liothyronine (CYTOMEL) 25 MCG Tab, Take 25 mcg by mouth every evening., Disp: , Rfl:   •  raNITidine (ZANTAC) 150 MG Tab, Take 300 mg by mouth 2 times a day., Disp: , Rfl:   •  AIMOVIG 70 MG/ML Solution Auto-injector, 1 mL by Injection route Q30 DAYS., Disp: , Rfl:   •  montelukast (SINGULAIR) 10 MG Tab, TAKE 1 TABLET BY MOUTH EVERY DAY, Disp: 90 Tab, Rfl: 3  •  estradiol (ESTRACE) 0.5 MG tablet, Take 0.5 mg by mouth every day., Disp: , Rfl:   •  ferrous sulfate 325 (65 Fe) MG tablet, Take 325 mg by mouth every day., Disp: , Rfl:   •  SYNTHROID 75 MCG Tab, Take 75 mcg by mouth Every morning on an empty stomach., Disp: , Rfl:   •  potassium chloride SA (KDUR) 20 MEQ Tab CR, Take 1 Tab by mouth every day., Disp: 30 Tab, Rfl: 1  •  pantoprazole (PROTONIX) 40 MG Tablet Delayed Response, take 1 tablet by mouth twice daily, Disp: , Rfl: 2  •  gabapentin (NEURONTIN) 400 MG Cap, Take 1 Cap by mouth 3 times a day., Disp: 180 Cap, Rfl: 3  •  DULoxetine (CYMBALTA) 60 MG Cap DR Particles delayed-release capsule, Take 1 Cap by mouth every day., Disp: 90 Cap, Rfl: 3  •  Coral Calcium 1000 (390 Ca) MG Tab, Take 1 Tab by mouth every day., Disp: , Rfl:   •  magnesium oxide (MAG-OX) 400 MG Tab, Take 400 mg by mouth every evening., Disp: , Rfl:   •  Biotin 5000 MCG Cap, Take 1 Cap by mouth every day., Disp: , Rfl:   •  Cholecalciferol (VITAMIN D) 2000 units Cap, Take 1 Cap by  "mouth every day., Disp: , Rfl:   •  Probiotic Product (PROBIOTIC PO), Take 1 Tab by mouth every day., Disp: , Rfl:   •  Menaquinone-7 (VITAMIN K2) 100 MCG Cap, Take 1 Tab by mouth every day., Disp: , Rfl:   •  multivitamin (THERAGRAN) Tab, Take 1 Tab by mouth every day., Disp: , Rfl:       ALLERGIES  Allergies   Allergen Reactions   • Bactrim [Sulfamethoxazole W-Trimethoprim] Shortness of Breath   • Bee Venom Anaphylaxis   • Contrast Media With Iodine [Iodine] Shortness of Breath   • Econazole Anaphylaxis   • Floxin [Ofloxacin] Anaphylaxis, Shortness of Breath and Swelling     Cause throat swelling and difficulty breathing   • Gadolinium Derivatives Hives and Swelling     Throat swelling   • Keflex Shortness of Breath   • Levaquin Shortness of Breath     anaphlyaxis   • Levofloxacin Shortness of Breath   • Morphine Anaphylaxis   • Naloxone Hives     \"hives, SOB\"   • Nitrofurantoin Shortness of Breath     ...and hives     • Norco [Apap-Fd&C Yellow #10 Al Tai-Hydrocodone] Shortness of Breath     ...and hives     • Oxycodone Shortness of Breath     ...and hives     • Penicillins Shortness of Breath     ...and hives     • Ancef [Cefazolin] Hives   • Bextra [Valdecoxib] Rash     \"Skin burn and peeling.\"   • Linezolid Rash     Rash all over body   • Other Drug Hives     \"Enconazole nitrate.\" shortness of breath and hives   • Other Misc Unspecified     Adhesive tape: blisters, skin peels off   • Clindamycin      HIVES     • Tygacil [Tigecycline]      itching       PHYSICAL EXAM  VITAL SIGNS: BP (!) 204/140   Pulse (!) 110   Temp 37.2 °C (98.9 °F) (Temporal)   Resp 19   Ht 1.626 m (5' 4\")   Wt 83.6 kg (184 lb 4.9 oz)   LMP 02/07/2016 (Within Months)   SpO2 97%   BMI 31.64 kg/m²  @TIM[200176::@   Constitutional: Well developed, Well nourished, No acute respiratory distress, Non-toxic appearance.   HENT: Normocephalic, Atraumatic, Bilateral external ears normal, Oropharynx clear, mucous membranes are moist.  Eyes: " Conjunctiva normal, No discharge. No icterus.  PERRLA at 4 mm bilaterally.  EOMI  Neck: Normal range of motion. Supple.  Mildly tender to palpation along the occipital ridge  Skin: Warm, Dry, No erythema, No rash.   Musculoskeletal: Good range of motion in all major joints. Normal gait.  Neurologic: Alert & oriented x 3. No focal deficits noted.  Cranial nerve and cerebellar exams are normal.  No meningeal signs.    CT-HEAD W/O   Final Result         1. No evidence of acute cerebral infarction, hemorrhage or mass lesion.      2. Right maxillary sinusitis.            COURSE & MEDICAL DECISION MAKING  Pertinent Labs & Imaging studies reviewed. (See chart for details)  Differential diagnosis may include invasive sinusitis, mass, reaction to recent injections.    Patient was given IV fluids for headache control in addition to p.o. Benadryl, IV magnesium and Depacon, IM Toradol.    Results for orders placed or performed during the hospital encounter of 06/10/19   BASIC METABOLIC PANEL   Result Value Ref Range    Sodium 139 135 - 145 mmol/L    Potassium 3.9 3.6 - 5.5 mmol/L    Chloride 104 96 - 112 mmol/L    Co2 25 20 - 33 mmol/L    Glucose 111 (H) 65 - 99 mg/dL    Bun 16 8 - 22 mg/dL    Creatinine 1.12 0.50 - 1.40 mg/dL    Calcium 8.5 8.4 - 10.2 mg/dL    Anion Gap 10.0 0.0 - 11.9   ESTIMATED GFR   Result Value Ref Range    GFR If African American >60 >60 mL/min/1.73 m 2    GFR If Non  51 (A) >60 mL/min/1.73 m 2        6:19 PM reevaluation the patient bedside.  She states that her headache is not throbbing quite as much.  Nausea is gone.  Blood pressure is still quite high so clonidine was given.    Upon recheck, patient's blood pressure is 163/96.  She will need to follow-up with her primary care provider for uncontrolled hypertension.    The patient will return for new or worsening symptoms and is stable at the time of discharge.    The patient is referred to a primary physician for blood pressure  management, diabetic screening, and for all other preventative health concerns.      DISPOSITION:  Patient will be discharged home in stable condition.    FOLLOW UP:  Simone Degroot M.D.  [address] [facility] [address]  [phone]    Schedule an appointment as soon as possible for a visit       Benson Ramirez M.D.  [address] [address]  [phone]    Schedule an appointment as soon as possible for a visit         OUTPATIENT MEDICATIONS:  Discharge Medication List as of 6/10/2019  8:05 PM           FINAL IMPRESSION  1. Migraine without aura and without status migrainosus, not intractable    2. Uncontrolled hypertension               Electronically signed by: Julia Washburn, 6/10/2019 4:44 PM

## 2019-06-11 ENCOUNTER — HOSPITAL ENCOUNTER (OUTPATIENT)
Dept: RADIOLOGY | Facility: MEDICAL CENTER | Age: 54
End: 2019-06-11
Attending: ORTHOPAEDIC SURGERY
Payer: MEDICARE

## 2019-06-11 DIAGNOSIS — S93.491A SPRAIN OF ANTERIOR TALOFIBULAR LIGAMENT OF RIGHT ANKLE, INITIAL ENCOUNTER: ICD-10-CM

## 2019-06-11 PROCEDURE — 73721 MRI JNT OF LWR EXTRE W/O DYE: CPT | Mod: RT

## 2019-06-11 NOTE — ED NOTES
"Pt resting in a position of comfort. Notes headache is \"better.\" Pt still unable to provide stool specimen.   "

## 2019-06-11 NOTE — ED NOTES
Report received from Aleshia ROBERTSON.  Assumed patient care. Pt assesement done.  Plan of care reviewed with patient.

## 2019-06-18 ENCOUNTER — OFFICE VISIT (OUTPATIENT)
Dept: URGENT CARE | Facility: MEDICAL CENTER | Age: 54
End: 2019-06-18
Payer: MEDICARE

## 2019-06-18 ENCOUNTER — HOSPITAL ENCOUNTER (OUTPATIENT)
Facility: MEDICAL CENTER | Age: 54
End: 2019-06-18
Attending: PHYSICIAN ASSISTANT
Payer: MEDICARE

## 2019-06-18 VITALS
HEART RATE: 96 BPM | DIASTOLIC BLOOD PRESSURE: 64 MMHG | SYSTOLIC BLOOD PRESSURE: 118 MMHG | BODY MASS INDEX: 31.41 KG/M2 | TEMPERATURE: 96.9 F | RESPIRATION RATE: 16 BRPM | OXYGEN SATURATION: 98 % | HEIGHT: 64 IN | WEIGHT: 184 LBS

## 2019-06-18 DIAGNOSIS — R30.0 DYSURIA: ICD-10-CM

## 2019-06-18 LAB
APPEARANCE UR: CLEAR
BILIRUB UR STRIP-MCNC: NORMAL MG/DL
COLOR UR AUTO: YELLOW
GLUCOSE UR STRIP.AUTO-MCNC: NORMAL MG/DL
KETONES UR STRIP.AUTO-MCNC: NORMAL MG/DL
LEUKOCYTE ESTERASE UR QL STRIP.AUTO: NORMAL
NITRITE UR QL STRIP.AUTO: NORMAL
PH UR STRIP.AUTO: 5.5 [PH] (ref 5–8)
PROT UR QL STRIP: NORMAL MG/DL
RBC UR QL AUTO: NORMAL
SP GR UR STRIP.AUTO: 1
UROBILINOGEN UR STRIP-MCNC: 0.2 MG/DL

## 2019-06-18 PROCEDURE — 87086 URINE CULTURE/COLONY COUNT: CPT

## 2019-06-18 PROCEDURE — 81002 URINALYSIS NONAUTO W/O SCOPE: CPT | Performed by: PHYSICIAN ASSISTANT

## 2019-06-18 PROCEDURE — 99213 OFFICE O/P EST LOW 20 MIN: CPT | Performed by: PHYSICIAN ASSISTANT

## 2019-06-18 ASSESSMENT — ENCOUNTER SYMPTOMS
VOMITING: 0
FEVER: 0
ABDOMINAL PAIN: 0
CHILLS: 0
FLANK PAIN: 0
COUGH: 0
BACK PAIN: 0
HEADACHES: 0
PALPITATIONS: 0
DIARRHEA: 1
NAUSEA: 0
SHORTNESS OF BREATH: 0

## 2019-06-18 NOTE — PROGRESS NOTES
Subjective:      Donna Isaac is a 53 y.o. female who presents with UTI (burning,x  today )            Dysuria    This is a new problem. The current episode started today. The problem has been unchanged. The quality of the pain is described as burning. The pain is mild. There has been no fever. There is no history of pyelonephritis. Pertinent negatives include no chills, discharge, flank pain, frequency, hematuria, hesitancy, nausea, urgency or vomiting. She has tried nothing for the symptoms. There is no history of recurrent UTIs.     PAtient is only able to take FOSFOMYCIN     Past Medical History:   Diagnosis Date   • AAA (abdominal aortic aneurysm) ([facility])    • Asthma    • Chronic pain    • Congestive heart failure ([facility])     Cardiologist, Dr. Carlson   • Fibromyalgia    • GERD (gastroesophageal reflux disease)    • Hypertension    • Migraine    • Osteoporosis    • Renal disorder     CKD   • Urticaria        Past Surgical History:   Procedure Laterality Date   • SHOULDER DECOMPRESSION ARTHROSCOPIC Left 2/19/2019    Procedure: SHOULDER DECOMPRESSION ARTHROSCOPIC - SUBACROMIAL;  Surgeon: Camila Elkins M.D.;  Location: [facility];  Service: Orthopedics   • CLAVICLE DISTAL EXCISION Left 2/19/2019    Procedure: CLAVICLE DISTAL EXCISION;  Surgeon: Camila Elkins M.D.;  Location: [facility];  Service: Orthopedics   • SHOULDER ARTHROSCOPY W/ BICIPITAL TENODESIS REPAIR Left 2/19/2019    Procedure: SHOULDER ARTHROSCOPY W/ BICIPITAL TENODESIS REPAIR;  Surgeon: Camila Elkins M.D.;  Location: [facility];  Service: Orthopedics   • GASTROSCOPY N/A 2/7/2019    Procedure: GASTROSCOPY- DIALATION AND BIOPSY;  Surgeon: Hola Veliz M.D.;  Location: [facility];  Service: Gastroenterology   • ABDOMINAL EXPLORATION  2002   • GASTRIC BYPASS LAPAROSCOPIC  1999   • HYSTERECTOMY, TOTAL ABDOMINAL  1995   • APPENDECTOMY  1974       Family History  "  Problem Relation Age of Onset   • Heart Disease Mother    • Diabetes Mother    • Heart Failure Mother    • Hypertension Mother    • Hypertension Father    • Heart Disease Brother    • Heart Attack Brother    • Hypertension Brother        Allergies   Allergen Reactions   • Bactrim [Sulfamethoxazole W-Trimethoprim] Shortness of Breath   • Bee Venom Anaphylaxis   • Contrast Media With Iodine [Iodine] Shortness of Breath   • Econazole Anaphylaxis   • Floxin [Ofloxacin] Anaphylaxis, Shortness of Breath and Swelling     Cause throat swelling and difficulty breathing   • Gadolinium Derivatives Hives and Swelling     Throat swelling   • Keflex Shortness of Breath   • Levaquin Shortness of Breath     anaphlyaxis   • Levofloxacin Shortness of Breath   • Morphine Anaphylaxis   • Naloxone Hives     \"hives, SOB\"   • Nitrofurantoin Shortness of Breath     ...and hives     • Norco [Apap-Fd&C Yellow #10 Al Tai-Hydrocodone] Shortness of Breath     ...and hives     • Oxycodone Shortness of Breath     ...and hives     • Penicillins Shortness of Breath     ...and hives     • Ancef [Cefazolin] Hives   • Bextra [Valdecoxib] Rash     \"Skin burn and peeling.\"   • Linezolid Rash     Rash all over body   • Other Drug Hives     \"Enconazole nitrate.\" shortness of breath and hives   • Other Misc Unspecified     Adhesive tape: blisters, skin peels off   • Clindamycin      HIVES     • Tygacil [Tigecycline]      itching       Medications, Allergies, and current problem list reviewed today in Epic    Review of Systems   Constitutional: Negative for chills, fever and malaise/fatigue.   Respiratory: Negative for cough and shortness of breath.    Cardiovascular: Negative for chest pain, palpitations and leg swelling.   Gastrointestinal: Positive for diarrhea (mild diarrhea the past few days ). Negative for abdominal pain, nausea and vomiting.   Genitourinary: Positive for dysuria. Negative for flank pain, frequency, hematuria, hesitancy and " "urgency.   Musculoskeletal: Negative for back pain.   Neurological: Negative for headaches.     All other systems reviewed and are negative.        Objective:     /64   Pulse 96   Temp 36.1 °C (96.9 °F)   Resp 16   Ht 1.626 m (5' 4\")   Wt 83.5 kg (184 lb)   LMP 02/07/2016 (Within Months)   SpO2 98%   BMI 31.58 kg/m²      Physical Exam   Constitutional: She is oriented to person, place, and time. She appears well-developed and well-nourished. No distress.   HENT:   Head: Normocephalic and atraumatic.   Eyes: Conjunctivae are normal.   Cardiovascular: Normal rate, regular rhythm and normal heart sounds.  Exam reveals no gallop and no friction rub.    No murmur heard.  Pulmonary/Chest: Effort normal. No respiratory distress. She has no wheezes. She has no rales.   Neurological: She is alert and oriented to person, place, and time. No cranial nerve deficit.   Skin: Skin is warm and dry. No rash noted.   Psychiatric: She has a normal mood and affect. Her behavior is normal. Judgment and thought content normal.          UA: negative      Assessment/Plan:     1. Dysuria      Possibly skin irritation.   Check culture and change treatment plan accordingly.  Push fluids  - URINE CULTURE(NEW); Future  - POCT Urinalysis     Differential diagnoses, Supportive care, and indications for immediate follow-up discussed with patient.   Instructed to return to clinic or nearest emergency department for any change in condition, further concerns, or worsening of symptoms.    The patient demonstrated a good understanding and agreed with the treatment plan.    Corazon Bonilla P.A.-C.    "

## 2019-06-19 DIAGNOSIS — R30.0 DYSURIA: ICD-10-CM

## 2019-06-21 ENCOUNTER — TELEPHONE (OUTPATIENT)
Dept: URGENT CARE | Facility: MEDICAL CENTER | Age: 54
End: 2019-06-21

## 2019-06-21 LAB
BACTERIA UR CULT: ABNORMAL
BACTERIA UR CULT: ABNORMAL
SIGNIFICANT IND 70042: ABNORMAL
SITE SITE: ABNORMAL
SOURCE SOURCE: ABNORMAL

## 2019-06-21 NOTE — TELEPHONE ENCOUNTER
6/21/2019    Lab called to let Corazon Smithnorah know that patient Donna urine culture was positive for Lactobacillus species. Sending Corazon this results via email.  Lalita SU MA

## 2019-07-05 ENCOUNTER — HOSPITAL ENCOUNTER (OUTPATIENT)
Dept: LAB | Facility: MEDICAL CENTER | Age: 54
End: 2019-07-05
Attending: OTOLARYNGOLOGY
Payer: MEDICARE

## 2019-07-05 PROCEDURE — 87205 SMEAR GRAM STAIN: CPT

## 2019-07-05 PROCEDURE — 87070 CULTURE OTHR SPECIMN AEROBIC: CPT

## 2019-07-05 PROCEDURE — 87075 CULTR BACTERIA EXCEPT BLOOD: CPT

## 2019-07-06 LAB
AMBIGUOUS DTTM AMBI4: NORMAL
AMBIGUOUS DTTM AMBI4: NORMAL
GRAM STN SPEC: NORMAL
GRAM STN SPEC: NORMAL
SIGNIFICANT IND 70042: NORMAL
SIGNIFICANT IND 70042: NORMAL
SITE SITE: NORMAL
SITE SITE: NORMAL
SOURCE SOURCE: NORMAL
SOURCE SOURCE: NORMAL

## 2019-07-08 ENCOUNTER — TELEPHONE (OUTPATIENT)
Dept: CARDIOLOGY | Facility: MEDICAL CENTER | Age: 54
End: 2019-07-08

## 2019-07-08 LAB
BACTERIA WND AEROBE CULT: ABNORMAL
BACTERIA WND AEROBE CULT: ABNORMAL
BACTERIA WND AEROBE CULT: NORMAL
GRAM STN SPEC: ABNORMAL
GRAM STN SPEC: NORMAL
SIGNIFICANT IND 70042: ABNORMAL
SIGNIFICANT IND 70042: NORMAL
SITE SITE: ABNORMAL
SITE SITE: NORMAL
SOURCE SOURCE: ABNORMAL
SOURCE SOURCE: NORMAL

## 2019-07-08 NOTE — TELEPHONE ENCOUNTER
Letter printed with MD recommendations and faxed to [phone], completed status.    ------------------  fax# for clearance   Received: Today Message Contents   Ann-Marieana lilia Lang, Med Ass't  Nori Hsieh R.N.   Phone Number: [phone]   DARLEEN     Izabela @ [facility] wanted to leave a msg about fax info re: stat clearance.   If you send the clearance back at 3pm or later, fax# is [phone] & before 3pm, fax# is [phone]      Patient Calls   Adriana Carlson M.D.   You 16 minutes ago (2:34 PM)     Low risk (Routing comment)

## 2019-07-08 NOTE — TELEPHONE ENCOUNTER
Received fax from [facility] (P: [phone], F: [phone])  requesting:    - STAT Cardiac Clearance for Right Endoscopic Carpal Tunnel Release, Bilateral Carpometacarpal injection under general anesthesia with Dr. Jenkins on 07/09/2019.    Request relayed to MD for advise.  Clearance request sent to scanning for reference.

## 2019-07-09 LAB
BACTERIA SPEC ANAEROBE CULT: NORMAL
BACTERIA SPEC ANAEROBE CULT: NORMAL
SIGNIFICANT IND 70042: NORMAL
SIGNIFICANT IND 70042: NORMAL
SITE SITE: NORMAL
SITE SITE: NORMAL
SOURCE SOURCE: NORMAL
SOURCE SOURCE: NORMAL

## 2019-07-29 ENCOUNTER — APPOINTMENT (RX ONLY)
Dept: URBAN - METROPOLITAN AREA CLINIC 4 | Facility: CLINIC | Age: 54
Setting detail: DERMATOLOGY
End: 2019-07-29

## 2019-07-29 DIAGNOSIS — L57.0 ACTINIC KERATOSIS: ICD-10-CM

## 2019-07-29 PROBLEM — D37.01 NEOPLASM OF UNCERTAIN BEHAVIOR OF LIP: Status: ACTIVE | Noted: 2019-07-29

## 2019-07-29 PROCEDURE — 40490 BIOPSY OF LIP: CPT | Mod: 59

## 2019-07-29 PROCEDURE — 17000 DESTRUCT PREMALG LESION: CPT

## 2019-07-29 PROCEDURE — ? BIOPSY BY SHAVE METHOD

## 2019-07-29 PROCEDURE — ? LIQUID NITROGEN

## 2019-07-29 ASSESSMENT — LOCATION DETAILED DESCRIPTION DERM: LOCATION DETAILED: LEFT NASAL SIDEWALL

## 2019-07-29 ASSESSMENT — LOCATION ZONE DERM: LOCATION ZONE: NOSE

## 2019-07-29 ASSESSMENT — LOCATION SIMPLE DESCRIPTION DERM: LOCATION SIMPLE: LEFT NOSE

## 2019-07-29 NOTE — PROCEDURE: BIOPSY BY SHAVE METHOD
Render Post-Care Instructions In Note?: no
Silver Nitrate Text: The wound bed was treated with silver nitrate after the biopsy was performed.
Dressing: bandage
Hemostasis: Pressure
Was A Bandage Applied: Yes
Lab: [id number]
Billing Type: Third-Party Bill
Detail Level: Detailed
Biopsy Method: Dermablade
Curettage Text: The wound bed was treated with curettage after the biopsy was performed.
Post-Care Instructions: I reviewed with the patient in detail post-care instructions. Patient is to keep the biopsy site dry overnight, and then apply bacitracin twice daily until healed. Patient may apply hydrogen peroxide soaks to remove any crusting.
Cryotherapy Text: The wound bed was treated with cryotherapy after the biopsy was performed.
Additional Anesthesia Volume In Cc (Will Not Render If 0): 0
Size Of Lesion In Cm: 0.5
Lab Facility: 
Anesthesia Type: 1% lidocaine with 1:100,000 epinephrine and 408mcg clindamycin/ml and a 1:10 solution of 8.4% sodium bicarbonate
Electrodesiccation Text: The wound bed was treated with electrodesiccation after the biopsy was performed.
Depth Of Biopsy: dermis
Notification Instructions: Patient will be notified of biopsy results. However, patient instructed to call the office if not contacted within 2 weeks.
Wound Care: Petrolatum
Consent: Written consent was obtained and risks were reviewed including but not limited to scarring, infection, bleeding, scabbing, incomplete removal, nerve damage and allergy to anesthesia.
Type Of Destruction Used: Curettage
Biopsy Type: H and E
Electrodesiccation And Curettage Text: The wound bed was treated with electrodesiccation and curettage after the biopsy was performed.

## 2019-08-26 ENCOUNTER — HOSPITAL ENCOUNTER (OUTPATIENT)
Dept: LAB | Facility: MEDICAL CENTER | Age: 54
End: 2019-08-26
Attending: INTERNAL MEDICINE
Payer: MEDICARE

## 2019-08-26 LAB
ALBUMIN SERPL BCP-MCNC: 4.5 G/DL (ref 3.2–4.9)
ALBUMIN/GLOB SERPL: 1.4 G/DL
ALP SERPL-CCNC: 136 U/L (ref 30–99)
ALT SERPL-CCNC: 39 U/L (ref 2–50)
ANION GAP SERPL CALC-SCNC: 14 MMOL/L (ref 0–11.9)
AST SERPL-CCNC: 38 U/L (ref 12–45)
BASOPHILS # BLD AUTO: 0.5 % (ref 0–1.8)
BASOPHILS # BLD AUTO: 0.8 % (ref 0–1.8)
BASOPHILS # BLD: 0.04 K/UL (ref 0–0.12)
BASOPHILS # BLD: 0.06 K/UL (ref 0–0.12)
BILIRUB SERPL-MCNC: 0.6 MG/DL (ref 0.1–1.5)
BUN SERPL-MCNC: 12 MG/DL (ref 8–22)
CALCIUM SERPL-MCNC: 10.2 MG/DL (ref 8.5–10.5)
CHLORIDE SERPL-SCNC: 101 MMOL/L (ref 96–112)
CHOLEST SERPL-MCNC: 193 MG/DL (ref 100–199)
CO2 SERPL-SCNC: 22 MMOL/L (ref 20–33)
CREAT SERPL-MCNC: 1.13 MG/DL (ref 0.5–1.4)
EOSINOPHIL # BLD AUTO: 0.12 K/UL (ref 0–0.51)
EOSINOPHIL # BLD AUTO: 0.13 K/UL (ref 0–0.51)
EOSINOPHIL NFR BLD: 1.6 % (ref 0–6.9)
EOSINOPHIL NFR BLD: 1.7 % (ref 0–6.9)
ERYTHROCYTE [DISTWIDTH] IN BLOOD BY AUTOMATED COUNT: 46.5 FL (ref 35.9–50)
ERYTHROCYTE [DISTWIDTH] IN BLOOD BY AUTOMATED COUNT: 47.3 FL (ref 35.9–50)
GLOBULIN SER CALC-MCNC: 3.2 G/DL (ref 1.9–3.5)
GLUCOSE SERPL-MCNC: 113 MG/DL (ref 65–99)
HCT VFR BLD AUTO: 52.1 % (ref 37–47)
HCT VFR BLD AUTO: 52.8 % (ref 37–47)
HDLC SERPL-MCNC: 92 MG/DL
HGB BLD-MCNC: 16.4 G/DL (ref 12–16)
HGB BLD-MCNC: 16.5 G/DL (ref 12–16)
IMM GRANULOCYTES # BLD AUTO: 0.02 K/UL (ref 0–0.11)
IMM GRANULOCYTES # BLD AUTO: 0.02 K/UL (ref 0–0.11)
IMM GRANULOCYTES NFR BLD AUTO: 0.3 % (ref 0–0.9)
IMM GRANULOCYTES NFR BLD AUTO: 0.3 % (ref 0–0.9)
LDLC SERPL CALC-MCNC: 65 MG/DL
LYMPHOCYTES # BLD AUTO: 1.27 K/UL (ref 1–4.8)
LYMPHOCYTES # BLD AUTO: 1.36 K/UL (ref 1–4.8)
LYMPHOCYTES NFR BLD: 17.3 % (ref 22–41)
LYMPHOCYTES NFR BLD: 17.7 % (ref 22–41)
MCH RBC QN AUTO: 29.6 PG (ref 27–33)
MCH RBC QN AUTO: 30.3 PG (ref 27–33)
MCHC RBC AUTO-ENTMCNC: 31.3 G/DL (ref 33.6–35)
MCHC RBC AUTO-ENTMCNC: 31.5 G/DL (ref 33.6–35)
MCV RBC AUTO: 94.8 FL (ref 81.4–97.8)
MCV RBC AUTO: 96.1 FL (ref 81.4–97.8)
MONOCYTES # BLD AUTO: 0.82 K/UL (ref 0–0.85)
MONOCYTES # BLD AUTO: 0.84 K/UL (ref 0–0.85)
MONOCYTES NFR BLD AUTO: 10.9 % (ref 0–13.4)
MONOCYTES NFR BLD AUTO: 11.2 % (ref 0–13.4)
NEUTROPHILS # BLD AUTO: 5.07 K/UL (ref 2–7.15)
NEUTROPHILS # BLD AUTO: 5.27 K/UL (ref 2–7.15)
NEUTROPHILS NFR BLD: 68.6 % (ref 44–72)
NEUTROPHILS NFR BLD: 69.1 % (ref 44–72)
NRBC # BLD AUTO: 0 K/UL
NRBC # BLD AUTO: 0 K/UL
NRBC BLD-RTO: 0 /100 WBC
NRBC BLD-RTO: 0 /100 WBC
PLATELET # BLD AUTO: 335 K/UL (ref 164–446)
PLATELET # BLD AUTO: 354 K/UL (ref 164–446)
PMV BLD AUTO: 11.2 FL (ref 9–12.9)
PMV BLD AUTO: 11.4 FL (ref 9–12.9)
POTASSIUM SERPL-SCNC: 4.4 MMOL/L (ref 3.6–5.5)
PROLACTIN SERPL-MCNC: 12.17 NG/ML (ref 2.8–26)
PROT SERPL-MCNC: 7.7 G/DL (ref 6–8.2)
PTH-INTACT SERPL-MCNC: 89.8 PG/ML (ref 14–72)
RBC # BLD AUTO: 5.42 M/UL (ref 4.2–5.4)
RBC # BLD AUTO: 5.57 M/UL (ref 4.2–5.4)
SODIUM SERPL-SCNC: 137 MMOL/L (ref 135–145)
T3FREE SERPL-MCNC: 13.17 PG/ML (ref 2.4–4.2)
T4 FREE SERPL-MCNC: 2.49 NG/DL (ref 0.53–1.43)
TRIGL SERPL-MCNC: 179 MG/DL (ref 0–149)
TSH SERPL DL<=0.005 MIU/L-ACNC: 0.38 UIU/ML (ref 0.38–5.33)
WBC # BLD AUTO: 7.3 K/UL (ref 4.8–10.8)
WBC # BLD AUTO: 7.7 K/UL (ref 4.8–10.8)

## 2019-08-26 PROCEDURE — 84403 ASSAY OF TOTAL TESTOSTERONE: CPT

## 2019-08-26 PROCEDURE — 84270 ASSAY OF SEX HORMONE GLOBUL: CPT

## 2019-08-26 PROCEDURE — 82784 ASSAY IGA/IGD/IGG/IGM EACH: CPT

## 2019-08-26 PROCEDURE — 83970 ASSAY OF PARATHORMONE: CPT

## 2019-08-26 PROCEDURE — 36415 COLL VENOUS BLD VENIPUNCTURE: CPT

## 2019-08-26 PROCEDURE — 86317 IMMUNOASSAY INFECTIOUS AGENT: CPT | Mod: 91

## 2019-08-26 PROCEDURE — 84305 ASSAY OF SOMATOMEDIN: CPT

## 2019-08-26 PROCEDURE — 84443 ASSAY THYROID STIM HORMONE: CPT

## 2019-08-26 PROCEDURE — 80053 COMPREHEN METABOLIC PANEL: CPT

## 2019-08-26 PROCEDURE — 84439 ASSAY OF FREE THYROXINE: CPT

## 2019-08-26 PROCEDURE — 82785 ASSAY OF IGE: CPT

## 2019-08-26 PROCEDURE — 80061 LIPID PANEL: CPT

## 2019-08-26 PROCEDURE — 85025 COMPLETE CBC W/AUTO DIFF WBC: CPT

## 2019-08-26 PROCEDURE — 85025 COMPLETE CBC W/AUTO DIFF WBC: CPT | Mod: 91

## 2019-08-26 PROCEDURE — 84146 ASSAY OF PROLACTIN: CPT

## 2019-08-26 PROCEDURE — 84481 FREE ASSAY (FT-3): CPT

## 2019-08-28 LAB
IGA SERPL-MCNC: 67 MG/DL (ref 68–408)
IGE SERPL-ACNC: 34 KU/L
IGF-I SERPL-MCNC: 105 NG/ML (ref 49–234)
IGF-I Z-SCORE SERPL: -0.3
IGG SERPL-MCNC: 745 MG/DL (ref 768–1632)
IGM SERPL-MCNC: 122 MG/DL (ref 35–263)

## 2019-08-29 LAB
C DIPHTHERIAE IGG SER-ACNC: 2 IU/ML
S PN DA SERO 19F IGG SER-MCNC: 3.1 UG/ML
S PNEUM DA 1 IGG SER-MCNC: 0.25 UG/ML
S PNEUM DA 12F IGG SER-MCNC: 0.05 UG/ML
S PNEUM DA 14 IGG SER-MCNC: 0.31 UG/ML
S PNEUM DA 18C IGG SER-MCNC: 1.26 UG/ML
S PNEUM DA 23F IGG SER-MCNC: 1.98 UG/ML
S PNEUM DA 3 IGG SER-MCNC: 0.1 UG/ML
S PNEUM DA 4 IGG SER-MCNC: 0.11 UG/ML
S PNEUM DA 5 IGG SER-MCNC: 5.34 UG/ML
S PNEUM DA 6B IGG SER-MCNC: 0.55 UG/ML
S PNEUM DA 7F IGG SER-MCNC: 5.14 UG/ML
S PNEUM DA 8 IGG SER-MCNC: 0.7 UG/ML
S PNEUM DA 9N IGG SER-MCNC: 0.13 UG/ML
S PNEUM DA 9V IGG SER-MCNC: 0.4 UG/ML
S PNEUM SEROTYPE IGG SER-IMP: NORMAL
SHBG SERPL-SCNC: 116 NMOL/L (ref 30–135)
TESTOST FREE SERPL-MCNC: 1.1 PG/ML (ref 0.6–3.8)
TESTOST SERPL-MCNC: 16 NG/DL (ref 9–55)

## 2019-09-12 ENCOUNTER — HOSPITAL ENCOUNTER (OUTPATIENT)
Dept: LAB | Facility: MEDICAL CENTER | Age: 54
End: 2019-09-12
Attending: OTOLARYNGOLOGY
Payer: MEDICARE

## 2019-09-12 PROCEDURE — 87070 CULTURE OTHR SPECIMN AEROBIC: CPT

## 2019-09-12 PROCEDURE — 87205 SMEAR GRAM STAIN: CPT

## 2019-09-12 PROCEDURE — 87077 CULTURE AEROBIC IDENTIFY: CPT

## 2019-09-12 PROCEDURE — 87075 CULTR BACTERIA EXCEPT BLOOD: CPT

## 2019-09-13 LAB
GRAM STN SPEC: NORMAL
SIGNIFICANT IND 70042: NORMAL
SITE SITE: NORMAL
SOURCE SOURCE: NORMAL

## 2019-09-20 LAB
BACTERIA SPEC ANAEROBE CULT: NORMAL
BACTERIA WND AEROBE CULT: ABNORMAL
GRAM STN SPEC: ABNORMAL
SIGNIFICANT IND 70042: ABNORMAL
SIGNIFICANT IND 70042: NORMAL
SITE SITE: ABNORMAL
SITE SITE: NORMAL
SOURCE SOURCE: ABNORMAL
SOURCE SOURCE: NORMAL

## 2019-09-25 ENCOUNTER — OFFICE VISIT (OUTPATIENT)
Dept: CARDIOLOGY | Facility: MEDICAL CENTER | Age: 54
End: 2019-09-25
Payer: MEDICARE

## 2019-09-25 ENCOUNTER — HOSPITAL ENCOUNTER (OUTPATIENT)
Facility: MEDICAL CENTER | Age: 54
End: 2019-09-25
Attending: PHYSICIAN ASSISTANT
Payer: MEDICARE

## 2019-09-25 ENCOUNTER — OFFICE VISIT (OUTPATIENT)
Dept: URGENT CARE | Facility: CLINIC | Age: 54
End: 2019-09-25
Payer: MEDICARE

## 2019-09-25 VITALS
HEART RATE: 96 BPM | SYSTOLIC BLOOD PRESSURE: 124 MMHG | BODY MASS INDEX: 30.39 KG/M2 | WEIGHT: 178 LBS | OXYGEN SATURATION: 95 % | DIASTOLIC BLOOD PRESSURE: 88 MMHG | HEIGHT: 64 IN

## 2019-09-25 VITALS
OXYGEN SATURATION: 96 % | WEIGHT: 180 LBS | TEMPERATURE: 98.1 F | RESPIRATION RATE: 16 BRPM | BODY MASS INDEX: 30.73 KG/M2 | SYSTOLIC BLOOD PRESSURE: 116 MMHG | HEART RATE: 97 BPM | HEIGHT: 64 IN | DIASTOLIC BLOOD PRESSURE: 74 MMHG

## 2019-09-25 DIAGNOSIS — N76.0 ACUTE VAGINITIS: Primary | ICD-10-CM

## 2019-09-25 DIAGNOSIS — N76.0 ACUTE VAGINITIS: ICD-10-CM

## 2019-09-25 DIAGNOSIS — I42.8 OTHER CARDIOMYOPATHY (HCC): ICD-10-CM

## 2019-09-25 DIAGNOSIS — I71.21 ASCENDING AORTIC ANEURYSM (HCC): ICD-10-CM

## 2019-09-25 DIAGNOSIS — I10 ESSENTIAL HYPERTENSION: ICD-10-CM

## 2019-09-25 LAB
APPEARANCE UR: CLEAR
BILIRUB UR STRIP-MCNC: NORMAL MG/DL
COLOR UR AUTO: NORMAL
GLUCOSE UR STRIP.AUTO-MCNC: 500 MG/DL
KETONES UR STRIP.AUTO-MCNC: NORMAL MG/DL
LEUKOCYTE ESTERASE UR QL STRIP.AUTO: NORMAL
NITRITE UR QL STRIP.AUTO: NORMAL
PH UR STRIP.AUTO: 6.5 [PH] (ref 5–8)
PROT UR QL STRIP: NORMAL MG/DL
RBC UR QL AUTO: NORMAL
SP GR UR STRIP.AUTO: 1.01
UROBILINOGEN UR STRIP-MCNC: 0.2 MG/DL

## 2019-09-25 PROCEDURE — 81002 URINALYSIS NONAUTO W/O SCOPE: CPT | Performed by: PHYSICIAN ASSISTANT

## 2019-09-25 PROCEDURE — 87510 GARDNER VAG DNA DIR PROBE: CPT

## 2019-09-25 PROCEDURE — 87480 CANDIDA DNA DIR PROBE: CPT

## 2019-09-25 PROCEDURE — 99214 OFFICE O/P EST MOD 30 MIN: CPT | Performed by: PHYSICIAN ASSISTANT

## 2019-09-25 PROCEDURE — 87660 TRICHOMONAS VAGIN DIR PROBE: CPT

## 2019-09-25 PROCEDURE — 99214 OFFICE O/P EST MOD 30 MIN: CPT | Performed by: INTERNAL MEDICINE

## 2019-09-25 PROCEDURE — 87086 URINE CULTURE/COLONY COUNT: CPT

## 2019-09-25 RX ORDER — DAPAGLIFLOZIN 5 MG/1
5 TABLET, FILM COATED ORAL EVERY EVENING
COMMUNITY
Start: 2019-09-24 | End: 2020-01-21

## 2019-09-25 RX ORDER — MELOXICAM 15 MG/1
15 TABLET ORAL EVERY EVENING
Status: ON HOLD | COMMUNITY
End: 2021-04-28

## 2019-09-25 RX ORDER — FLUCONAZOLE 150 MG/1
150 TABLET ORAL ONCE
Qty: 1 TAB | Refills: 0 | Status: SHIPPED | OUTPATIENT
Start: 2019-09-25 | End: 2019-09-25

## 2019-09-25 RX ORDER — CALCITONIN SALMON 200 [IU]/.09ML
1 SPRAY, METERED NASAL
Refills: 12 | Status: ON HOLD | COMMUNITY
Start: 2019-09-22 | End: 2021-12-29

## 2019-09-25 ASSESSMENT — ENCOUNTER SYMPTOMS
BRUISES/BLEEDS EASILY: 0
VOMITING: 0
BACK PAIN: 1
DIZZINESS: 0
SHORTNESS OF BREATH: 0
PALPITATIONS: 0
NAUSEA: 0
NERVOUS/ANXIOUS: 0

## 2019-09-25 NOTE — PROGRESS NOTES
Chief Complaint   Patient presents with   •  Cardiomyopathy   Hypertension    Subjective:   Donna Isaac is a 54 y.o. female who presents today follow-up above issues    Underwent echocardiography in April which showed normal LV systolic function and wall motion.  Ascending aorta with patient to be at 4.1 cm.    Has been on furosemide and K+ for a year  Has not tried going off it    Some chest pain but only when she eats  Had esophageal dilation several times over the year    Past Medical History:   Diagnosis Date   • AAA (abdominal aortic aneurysm) ([facility])    • Asthma    • Chronic pain    • Congestive heart failure ([facility])     Cardiologist, Dr. Carlson   • Fibromyalgia    • GERD (gastroesophageal reflux disease)    • Hypertension    • Migraine    • Osteoporosis    • Renal disorder     CKD   • Urticaria      Past Surgical History:   Procedure Laterality Date   • SHOULDER DECOMPRESSION ARTHROSCOPIC Left 2/19/2019    Procedure: SHOULDER DECOMPRESSION ARTHROSCOPIC - SUBACROMIAL;  Surgeon: Camila Elkins M.D.;  Location: [facility];  Service: Orthopedics   • CLAVICLE DISTAL EXCISION Left 2/19/2019    Procedure: CLAVICLE DISTAL EXCISION;  Surgeon: Camila Elkins M.D.;  Location: [facility];  Service: Orthopedics   • SHOULDER ARTHROSCOPY W/ BICIPITAL TENODESIS REPAIR Left 2/19/2019    Procedure: SHOULDER ARTHROSCOPY W/ BICIPITAL TENODESIS REPAIR;  Surgeon: Camila Elkins M.D.;  Location: [facility];  Service: Orthopedics   • GASTROSCOPY N/A 2/7/2019    Procedure: GASTROSCOPY- DIALATION AND BIOPSY;  Surgeon: Hola Veliz M.D.;  Location: [facility];  Service: Gastroenterology   • ABDOMINAL EXPLORATION  2002   • GASTRIC BYPASS LAPAROSCOPIC  1999   • HYSTERECTOMY, TOTAL ABDOMINAL  1995   • APPENDECTOMY  1974     Family History   Problem Relation Age of Onset   • Heart Disease Mother    • Diabetes Mother    • Heart Failure Mother    •  Hypertension Mother    • Hypertension Father    • Heart Disease Brother    • Heart Attack Brother    • Hypertension Brother      Social History     Socioeconomic History   • Marital status:      Spouse name: Not on file   • Number of children: Not on file   • Years of education: Not on file   • Highest education level: Not on file   Occupational History   • Not on file   Social Needs   • Financial resource strain: Not on file   • Food insecurity:     Worry: Not on file     Inability: Not on file   • Transportation needs:     Medical: Not on file     Non-medical: Not on file   Tobacco Use   • Smoking status: Former Smoker   • Smokeless tobacco: Never Used   Substance and Sexual Activity   • Alcohol use: Yes     Comment: 1-2 /month    • Drug use: No   • Sexual activity: Not on file   Lifestyle   • Physical activity:     Days per week: Not on file     Minutes per session: Not on file   • Stress: Not on file   Relationships   • Social connections:     Talks on phone: Not on file     Gets together: Not on file     Attends [employer] service: Not on file     Active member of club or organization: Not on file     Attends meetings of clubs or organizations: Not on file     Relationship status: Not on file   • Intimate partner violence:     Fear of current or ex partner: Not on file     Emotionally abused: Not on file     Physically abused: Not on file     Forced sexual activity: Not on file   Other Topics Concern   • Not on file   Social History Narrative   • Not on file     Allergies   Allergen Reactions   • Bactrim [Sulfamethoxazole W-Trimethoprim] Shortness of Breath   • Bee Venom Anaphylaxis   • Contrast Media With Iodine [Iodine] Shortness of Breath   • Econazole Anaphylaxis   • Floxin [Ofloxacin] Anaphylaxis, Shortness of Breath and Swelling     Cause throat swelling and difficulty breathing   • Gadolinium Derivatives Hives and Swelling     Throat swelling   • Keflex Shortness of Breath   • Levaquin Shortness of  "Breath     anaphlyaxis   • Levofloxacin Shortness of Breath   • Morphine Anaphylaxis   • Naloxone Hives     \"hives, SOB\"   • Nitrofurantoin Shortness of Breath     ...and hives     • Norco [Apap-Fd&C Yellow #10 Al Tai-Hydrocodone] Shortness of Breath     ...and hives     • Oxycodone Shortness of Breath     ...and hives     • Penicillins Shortness of Breath     ...and hives     • Tape    • Ancef [Cefazolin] Hives   • Bextra [Valdecoxib] Rash     \"Skin burn and peeling.\"   • Linezolid Rash     Rash all over body   • Other Drug Hives     \"Enconazole nitrate.\" shortness of breath and hives   • Other Misc Unspecified     Adhesive tape: blisters, skin peels off   • Buprenorphine Anaphylaxis   • Clindamycin      HIVES     • Tygacil [Tigecycline]      itching     Outpatient Encounter Medications as of 9/25/2019   Medication Sig Dispense Refill   • FARXIGA 5 MG Tab      • calcitonin, salmon, (MIACALCIN) 200 UNIT/ACT Solution   12   • meloxicam (MOBIC) 15 MG tablet Take 15 mg by mouth every day.     • furosemide (LASIX) 20 MG Tab TAKE 0.5 TABS BY MOUTH 2 TIMES A DAY. 90 Tab 1   • losartan (COZAAR) 25 MG Tab TAKE 1 TABLET BY MOUTH EVERY DAY 90 Tab 3   • tizanidine (ZANAFLEX) 4 MG Tab TAKE 1 TABLET BY MOUTH EVERY 6 HOURS AS NEEDED 180 Tab 3   • Cyanocobalamin (B-12) 1000 MCG/ML Kit by Injection route every 7 days.     • colesevelam (WELCHOL) 625 MG Tab Take  by mouth every day.     • EPINEPHrine (EPIPEN) 0.3 MG/0.3ML Solution Auto-injector solution for injection      • Frovatriptan Succinate 2.5 MG Tab TAKE 1 TABLET BY MOUTH EVERY DAY AS NEEDED 10 Tab 3   • cevimeline (EVOXAC) 30 MG capsule Take 1 Cap by mouth 3 times a day. 270 Cap 0   • albuterol 108 (90 Base) MCG/ACT Aero Soln inhalation aerosol Inhale 2 Puffs by mouth every 6 hours as needed for Shortness of Breath.     • cetirizine (KLS ALLER-HAWA) 10 MG Tab Take 40 mg by mouth every day.     • liothyronine (CYTOMEL) 25 MCG Tab Take 25 mcg by mouth every evening.     • " raNITidine (ZANTAC) 150 MG Tab Take 300 mg by mouth 2 times a day.     • AIMOVIG 70 MG/ML Solution Auto-injector 1 mL by Injection route Q30 DAYS.     • montelukast (SINGULAIR) 10 MG Tab TAKE 1 TABLET BY MOUTH EVERY DAY 90 Tab 3   • estradiol (ESTRACE) 0.5 MG tablet Take 0.5 mg by mouth every day.     • SYNTHROID 75 MCG Tab Take 75 mcg by mouth Every morning on an empty stomach.     • potassium chloride SA (KDUR) 20 MEQ Tab CR Take 1 Tab by mouth every day. 30 Tab 1   • pantoprazole (PROTONIX) 40 MG Tablet Delayed Response take 1 tablet by mouth twice daily  2   • gabapentin (NEURONTIN) 400 MG Cap Take 1 Cap by mouth 3 times a day. 180 Cap 3   • DULoxetine (CYMBALTA) 60 MG Cap DR Particles delayed-release capsule Take 1 Cap by mouth every day. 90 Cap 3   • Coral Calcium 1000 (390 Ca) MG Tab Take 1 Tab by mouth every day.     • magnesium oxide (MAG-OX) 400 MG Tab Take 400 mg by mouth every evening.     • Biotin 5000 MCG Cap Take 1 Cap by mouth every day.     • Cholecalciferol (VITAMIN D) 2000 units Cap Take 1 Cap by mouth every day.     • Probiotic Product (PROBIOTIC PO) Take 1 Tab by mouth every day.     • Menaquinone-7 (VITAMIN K2) 100 MCG Cap Take 1 Tab by mouth every day.     • multivitamin (THERAGRAN) Tab Take 1 Tab by mouth every day.     • clotrimazole (LOTRIMIN) 1 % Solution Apply  to affected area(s) 2 times a day. ankle     • Diclofenac Sodium 1 % Gel as needed.     • triamcinolone (KENALOG) 0.1 % lotion Apply  to affected area(s) 2 Times a Day.     • pilocarpine (SALAGEN) 5 MG Tab Take 5 mg by mouth 2 Times a Day.     • furosemide (LASIX) 20 MG Tab Take 10 mg by mouth 2 times a day.     • ferrous sulfate 325 (65 Fe) MG tablet Take 325 mg by mouth every day.       No facility-administered encounter medications on file as of 9/25/2019.      Review of Systems   Constitutional: Negative for malaise/fatigue.   HENT: Positive for congestion.    Respiratory: Negative for shortness of breath.    Cardiovascular:  "Negative for palpitations.   Gastrointestinal: Negative for nausea and vomiting.   Genitourinary:        Had yeast infection recently   Musculoskeletal: Positive for back pain.        Left mid/upper back pain this AM, constant  Better with urination   Neurological: Negative for dizziness.   Endo/Heme/Allergies: Does not bruise/bleed easily.        Last TFT a month ago   Psychiatric/Behavioral: The patient is not nervous/anxious.         Objective:   /88 (BP Location: Left arm, Patient Position: Sitting, BP Cuff Size: Adult)   Pulse 96   Ht 1.626 m (5' 4\")   Wt 80.7 kg (178 lb)   LMP 02/07/2016 (Within Months)   SpO2 95%   BMI 30.55 kg/m²     Physical Exam   Constitutional: She is oriented to person, place, and time.   Eyes: Left eye exhibits no discharge.   Neck: No JVD present. No thyromegaly present.   Cardiovascular: Normal rate and regular rhythm. Exam reveals no gallop.   No murmur heard.  Pulmonary/Chest: Effort normal.   Abdominal: Soft. There is no tenderness.   Musculoskeletal: She exhibits no edema.   Neurological: She is alert and oriented to person, place, and time.   Skin: No erythema.   Psychiatric: She has a normal mood and affect.       Assessment:     1. Other cardiomyopathy (HCC) likely stress induced LV function has returned to normal.  No sign of fluid retention    2. Essential hypertension controlled    3. Ascending aortic aneurysm (HCC)         Medical Decision Making:  Today's Assessment / Status / Plan:     May try weaning off furosemide to PRN.  May discontinue potassium supplementation once discontinue furosemide.  We will continue other current cardiac medication.  RTC in 9 months with preclinic labs.  You for allowing us to participate in the care of this patient.    "

## 2019-09-26 ENCOUNTER — TELEPHONE (OUTPATIENT)
Dept: URGENT CARE | Facility: CLINIC | Age: 54
End: 2019-09-26

## 2019-09-26 DIAGNOSIS — B96.89 BV (BACTERIAL VAGINOSIS): Primary | ICD-10-CM

## 2019-09-26 DIAGNOSIS — N76.0 BV (BACTERIAL VAGINOSIS): Primary | ICD-10-CM

## 2019-09-26 LAB
CANDIDA DNA VAG QL PROBE+SIG AMP: NEGATIVE
G VAGINALIS DNA VAG QL PROBE+SIG AMP: POSITIVE
T VAGINALIS DNA VAG QL PROBE+SIG AMP: NEGATIVE

## 2019-09-26 RX ORDER — METRONIDAZOLE 500 MG/1
500 TABLET ORAL EVERY 8 HOURS
Qty: 30 TAB | Refills: 0 | Status: SHIPPED | OUTPATIENT
Start: 2019-09-26 | End: 2019-10-06

## 2019-09-26 NOTE — PATIENT INSTRUCTIONS
Candidal Vulvovaginitis  Candidal vulvovaginitis is an infection of the vagina and vulva. The vulva is the skin around the opening of the vagina. This may cause itching and discomfort in and around the vagina.   HOME CARE  · Only take medicine as told by your doctor.  · Do not have sex (intercourse) until the infection is healed or as told by your doctor.  · Practice safe sex.  · Tell your sex partner about your infection.  · Do not douche or use tampons.  · Wear cotton underwear. Do not wear tight pants or panty hose.  · Eat yogurt. This may help treat and prevent yeast infections.  GET HELP RIGHT AWAY IF:   · You have a fever.  · Your problems get worse during treatment or do not get better in 3 days.  · You have discomfort, irritation, or itching in your vagina or vulva area.  · You have pain after sex.  · You start to get belly (abdominal) pain.  MAKE SURE YOU:  · Understand these instructions.  · Will watch your condition.  · Will get help right away if you are not doing well or get worse.  Document Released: 03/16/2010 Document Revised: 03/11/2013 Document Reviewed: 03/16/2010  [employer]® Patient Information ©2014 [employer].

## 2019-09-26 NOTE — PROGRESS NOTES
Subjective:      Pt is a 54 y.o. female who presents with Back Pain (mid back) and Dysuria (x3 days)            HPI  This is a new problem. Pt notes 3 days of vaginal irritation without discharge or itching and mild dysuria x 3 days. Pt has not taken any Rx medications for this condition. Pt states the pain is a 3/10, aching in nature and worse at night. Pt denies CP, SOB, NVD, paresthesias, headaches, dizziness, change in vision, hives, or other joint pain. The pt's medication list, problem list, and allergies have been evaluated and reviewed during today's visit.    PMH:  Past Medical History:   Diagnosis Date   • AAA (abdominal aortic aneurysm) ([facility])    • Asthma    • Chronic pain    • Congestive heart failure ([facility])     Cardiologist, Dr. Carlson   • Fibromyalgia    • GERD (gastroesophageal reflux disease)    • Hypertension    • Migraine    • Osteoporosis    • Renal disorder     CKD   • Urticaria        PSH:  Past Surgical History:   Procedure Laterality Date   • SHOULDER DECOMPRESSION ARTHROSCOPIC Left 2/19/2019    Procedure: SHOULDER DECOMPRESSION ARTHROSCOPIC - SUBACROMIAL;  Surgeon: Camila Elkins M.D.;  Location: [facility];  Service: Orthopedics   • CLAVICLE DISTAL EXCISION Left 2/19/2019    Procedure: CLAVICLE DISTAL EXCISION;  Surgeon: Camila Elkins M.D.;  Location: [facility];  Service: Orthopedics   • SHOULDER ARTHROSCOPY W/ BICIPITAL TENODESIS REPAIR Left 2/19/2019    Procedure: SHOULDER ARTHROSCOPY W/ BICIPITAL TENODESIS REPAIR;  Surgeon: Camila Elkins M.D.;  Location: [facility];  Service: Orthopedics   • GASTROSCOPY N/A 2/7/2019    Procedure: GASTROSCOPY- DIALATION AND BIOPSY;  Surgeon: Hola Veliz M.D.;  Location: [facility];  Service: Gastroenterology   • ABDOMINAL EXPLORATION  2002   • GASTRIC BYPASS LAPAROSCOPIC  1999   • HYSTERECTOMY, TOTAL ABDOMINAL  1995   • APPENDECTOMY  1974       Fam Hx:    family history  includes Diabetes in her mother; Heart Attack in her brother; Heart Disease in her brother and mother; Heart Failure in her mother; Hypertension in her brother, father, and mother.  Family Status   Relation Name Status   • Mo     • Fa  Alive   • Bro     • MGMo     • MGFa     • PGMo     • PGFa         Soc HX:  Social History     Socioeconomic History   • Marital status:      Spouse name: Not on file   • Number of children: Not on file   • Years of education: Not on file   • Highest education level: Not on file   Occupational History   • Not on file   Social Needs   • Financial resource strain: Not on file   • Food insecurity:     Worry: Not on file     Inability: Not on file   • Transportation needs:     Medical: Not on file     Non-medical: Not on file   Tobacco Use   • Smoking status: Former Smoker   • Smokeless tobacco: Never Used   Substance and Sexual Activity   • Alcohol use: Yes     Comment: 1-2 /month    • Drug use: No   • Sexual activity: Not on file   Lifestyle   • Physical activity:     Days per week: Not on file     Minutes per session: Not on file   • Stress: Not on file   Relationships   • Social connections:     Talks on phone: Not on file     Gets together: Not on file     Attends Shinto service: Not on file     Active member of club or organization: Not on file     Attends meetings of clubs or organizations: Not on file     Relationship status: Not on file   • Intimate partner violence:     Fear of current or ex partner: Not on file     Emotionally abused: Not on file     Physically abused: Not on file     Forced sexual activity: Not on file   Other Topics Concern   • Not on file   Social History Narrative   • Not on file         Medications:    Current Outpatient Medications:   •  fluconazole (DIFLUCAN) 150 MG tablet, Take 1 Tab by mouth Once for 1 dose., Disp: 1 Tab, Rfl: 0  •  FARXIGA 5 MG Tab, , Disp: , Rfl:   •  calcitonin, salmon,  (MIACALCIN) 200 UNIT/ACT Solution, , Disp: , Rfl: 12  •  meloxicam (MOBIC) 15 MG tablet, Take 15 mg by mouth every day., Disp: , Rfl:   •  furosemide (LASIX) 20 MG Tab, TAKE 0.5 TABS BY MOUTH 2 TIMES A DAY., Disp: 90 Tab, Rfl: 1  •  losartan (COZAAR) 25 MG Tab, TAKE 1 TABLET BY MOUTH EVERY DAY, Disp: 90 Tab, Rfl: 3  •  tizanidine (ZANAFLEX) 4 MG Tab, TAKE 1 TABLET BY MOUTH EVERY 6 HOURS AS NEEDED, Disp: 180 Tab, Rfl: 3  •  clotrimazole (LOTRIMIN) 1 % Solution, Apply  to affected area(s) 2 times a day. ankle, Disp: , Rfl:   •  Diclofenac Sodium 1 % Gel, as needed., Disp: , Rfl:   •  triamcinolone (KENALOG) 0.1 % lotion, Apply  to affected area(s) 2 Times a Day., Disp: , Rfl:   •  Cyanocobalamin (B-12) 1000 MCG/ML Kit, by Injection route every 7 days., Disp: , Rfl:   •  colesevelam (WELCHOL) 625 MG Tab, Take  by mouth every day., Disp: , Rfl:   •  EPINEPHrine (EPIPEN) 0.3 MG/0.3ML Solution Auto-injector solution for injection, , Disp: , Rfl:   •  pilocarpine (SALAGEN) 5 MG Tab, Take 5 mg by mouth 2 Times a Day., Disp: , Rfl:   •  Frovatriptan Succinate 2.5 MG Tab, TAKE 1 TABLET BY MOUTH EVERY DAY AS NEEDED, Disp: 10 Tab, Rfl: 3  •  cevimeline (EVOXAC) 30 MG capsule, Take 1 Cap by mouth 3 times a day., Disp: 270 Cap, Rfl: 0  •  albuterol 108 (90 Base) MCG/ACT Aero Soln inhalation aerosol, Inhale 2 Puffs by mouth every 6 hours as needed for Shortness of Breath., Disp: , Rfl:   •  cetirizine (KLS ALLER-HAWA) 10 MG Tab, Take 40 mg by mouth every day., Disp: , Rfl:   •  furosemide (LASIX) 20 MG Tab, Take 10 mg by mouth 2 times a day., Disp: , Rfl:   •  liothyronine (CYTOMEL) 25 MCG Tab, Take 25 mcg by mouth every evening., Disp: , Rfl:   •  raNITidine (ZANTAC) 150 MG Tab, Take 300 mg by mouth 2 times a day., Disp: , Rfl:   •  AIMOVIG 70 MG/ML Solution Auto-injector, 1 mL by Injection route Q30 DAYS., Disp: , Rfl:   •  montelukast (SINGULAIR) 10 MG Tab, TAKE 1 TABLET BY MOUTH EVERY DAY, Disp: 90 Tab, Rfl: 3  •  estradiol  (ESTRACE) 0.5 MG tablet, Take 0.5 mg by mouth every day., Disp: , Rfl:   •  ferrous sulfate 325 (65 Fe) MG tablet, Take 325 mg by mouth every day., Disp: , Rfl:   •  SYNTHROID 75 MCG Tab, Take 75 mcg by mouth Every morning on an empty stomach., Disp: , Rfl:   •  potassium chloride SA (KDUR) 20 MEQ Tab CR, Take 1 Tab by mouth every day., Disp: 30 Tab, Rfl: 1  •  pantoprazole (PROTONIX) 40 MG Tablet Delayed Response, take 1 tablet by mouth twice daily, Disp: , Rfl: 2  •  gabapentin (NEURONTIN) 400 MG Cap, Take 1 Cap by mouth 3 times a day., Disp: 180 Cap, Rfl: 3  •  DULoxetine (CYMBALTA) 60 MG Cap DR Particles delayed-release capsule, Take 1 Cap by mouth every day., Disp: 90 Cap, Rfl: 3  •  Coral Calcium 1000 (390 Ca) MG Tab, Take 1 Tab by mouth every day., Disp: , Rfl:   •  magnesium oxide (MAG-OX) 400 MG Tab, Take 400 mg by mouth every evening., Disp: , Rfl:   •  Biotin 5000 MCG Cap, Take 1 Cap by mouth every day., Disp: , Rfl:   •  Cholecalciferol (VITAMIN D) 2000 units Cap, Take 1 Cap by mouth every day., Disp: , Rfl:   •  Probiotic Product (PROBIOTIC PO), Take 1 Tab by mouth every day., Disp: , Rfl:   •  Menaquinone-7 (VITAMIN K2) 100 MCG Cap, Take 1 Tab by mouth every day., Disp: , Rfl:   •  multivitamin (THERAGRAN) Tab, Take 1 Tab by mouth every day., Disp: , Rfl:       Allergies:  Bactrim [sulfamethoxazole w-trimethoprim]; Bee venom; Contrast media with iodine [iodine]; Econazole; Floxin [ofloxacin]; Gadolinium derivatives; Keflex; Levaquin; Levofloxacin; Morphine; Naloxone; Nitrofurantoin; Norco [apap-fd&c yellow #10 [place]-hydrocodone]; Oxycodone; Penicillins; Tape; Ancef [cefazolin]; Bextra [valdecoxib]; Linezolid; Other drug; Other misc; Buprenorphine; Clindamycin; and Tygacil [tigecycline]    ROS  Constitutional: Negative for fever, chills and malaise/fatigue.   HENT: Negative for congestion and sore throat.    Eyes: Negative for blurred vision, double vision and photophobia.   Respiratory: Negative for  "cough and shortness of breath.  Cardiovascular: Negative for chest pain and palpitations.   Gastrointestinal: Negative for heartburn, nausea, vomiting, abdominal pain, diarrhea and constipation.   Genitourinary: POS for dysuria and flank pain and vaginitis.   Musculoskeletal: Negative for joint pain and myalgias.   Skin: Negative for itching and rash.   Neurological: Negative for dizziness, tingling and headaches.   Endo/Heme/Allergies: Does not bruise/bleed easily.   Psychiatric/Behavioral: Negative for depression. The patient is not nervous/anxious.           Objective:     /74 (BP Location: Left arm, Patient Position: Sitting, BP Cuff Size: Adult long)   Pulse 97   Temp 36.7 °C (98.1 °F) (Temporal)   Resp 16   Ht 1.626 m (5' 4\")   Wt 81.6 kg (180 lb)   LMP 02/07/2016 (Within Months)   SpO2 96%   BMI 30.90 kg/m²      Physical Exam       Physical Exam   Constitutional: She is oriented to person, place, and time. She appears well-developed and well-nourished. No distress.   HENT:   Head: Normocephalic and atraumatic.   Right Ear: External ear normal.   Left Ear: External ear normal.   Nose: Nose normal.   Mouth/Throat: Oropharynx is clear and moist. No oropharyngeal exudate.   Eyes: Conjunctivae normal and EOM are normal. Pupils are equal, round, and reactive to light.   Neck: Normal range of motion. Neck supple. No thyromegaly present.   Cardiovascular: Normal rate, regular rhythm, normal heart sounds and intact distal pulses.  Exam reveals no gallop and no friction rub.    No murmur heard.  Pulmonary/Chest: Effort normal and breath sounds normal. No respiratory distress. She has no wheezes. She has no rales. She exhibits no tenderness.   Abdominal: Soft. Bowel sounds are normal. She exhibits no distension and no mass. There is no tenderness. There is no rebound and no guarding.   Genitourinary:        Pt deferred   Musculoskeletal: Normal range of motion. She exhibits no edema and no tenderness. "   Lymphadenopathy:     She has no cervical adenopathy.   Neurological: She is alert and oriented to person, place, and time. She has normal reflexes. No cranial nerve deficit.   Skin: Skin is warm and dry. No rash noted. No erythema.   Psychiatric: She has a normal mood and affect. Her behavior is normal. Judgment and thought content normal.        Assessment/Plan:     1. Acute vaginitis    - POCT Urinalysis-->  - URINE CULTURE(NEW); Future  - VAGINAL PATHOGENS DNA PANEL; Future  - fluconazole (DIFLUCAN) 150 MG tablet; Take 1 Tab by mouth Once for 1 dose.  Dispense: 1 Tab; Refill: 0    Rest, fluids encouraged.  AVS with medical info given.  Pt was in full understanding and agreement with the plan.  Differential diagnosis, natural history, supportive care, and indications for immediate follow-up discussed. All questions answered. Patient agrees with the plan of care.  Follow-up as needed if symptoms worsen or fail to improve.

## 2019-09-27 LAB
BACTERIA UR CULT: NORMAL
SIGNIFICANT IND 70042: NORMAL
SITE SITE: NORMAL
SOURCE SOURCE: NORMAL

## 2019-09-27 NOTE — TELEPHONE ENCOUNTER
Called and left message with pt about vaginal culture results which came back positive for BV  I called in flagyl today.  Encouraged Pt to call back with questions.  Gilberto Arreguin PA-C

## 2019-09-29 ENCOUNTER — TELEPHONE (OUTPATIENT)
Dept: URGENT CARE | Facility: CLINIC | Age: 54
End: 2019-09-29

## 2019-09-29 NOTE — TELEPHONE ENCOUNTER
PT now notes hives to Flagyl. I spoke with her pharmacist who notes there is nothing left to treat her BV as she is allergic to almost every antibiotic class of medication.  PT to go and speak with pharmacist to see what can be done.  Gilberto Arreguin Pa-C

## 2019-09-29 NOTE — TELEPHONE ENCOUNTER
Donna called office requesting to speak with provider Gilberto Arreguin, patient state she was told call back with any questions. Patient was inform provider is busy at the moment with patient but I will send him a message to call her back.

## 2019-10-14 ENCOUNTER — OFFICE VISIT (OUTPATIENT)
Dept: INFECTIOUS DISEASES | Facility: MEDICAL CENTER | Age: 54
End: 2019-10-14
Payer: MEDICARE

## 2019-10-14 VITALS
OXYGEN SATURATION: 97 % | BODY MASS INDEX: 31.07 KG/M2 | SYSTOLIC BLOOD PRESSURE: 138 MMHG | HEART RATE: 115 BPM | DIASTOLIC BLOOD PRESSURE: 88 MMHG | TEMPERATURE: 97.6 F | WEIGHT: 182 LBS | HEIGHT: 64 IN

## 2019-10-14 DIAGNOSIS — J31.0 CHRONIC RHINITIS: ICD-10-CM

## 2019-10-14 DIAGNOSIS — Z88.9 MULTIPLE ALLERGIES: ICD-10-CM

## 2019-10-14 PROCEDURE — 99213 OFFICE O/P EST LOW 20 MIN: CPT | Performed by: INTERNAL MEDICINE

## 2019-10-14 RX ORDER — METRONIDAZOLE 7.5 MG/G
GEL VAGINAL
Qty: 1 TUBE | Refills: 0 | Status: SHIPPED | OUTPATIENT
Start: 2019-10-14 | End: 2019-10-30

## 2019-10-14 RX ORDER — METRONIDAZOLE 7.5 MG/G
GEL TOPICAL
Qty: 1 TUBE | Refills: 1 | Status: SHIPPED | OUTPATIENT
Start: 2019-10-14 | End: 2019-10-14

## 2019-10-14 NOTE — PROGRESS NOTES
"Chief Complaint   Patient presents with   • Follow-Up     Subacute otitis media   vaginits    Infectious Disease clinic follow-up:  Patient is a 53 y.o. female in the clinic today for ID FU appointment.   She was seen in consultation on 2/27/2019 for otitis media.Patient has been battling with chronic ear infections of the right ear for many years.  She has been experiencing yellow drainage from her right ear associated with the ear pain.  She has also been noticing green mucus nasal discharge.  She has a perforated eardrum and normally wears hearing aids but has not been able to wear them secondary to current infection.  She denies any associated fevers or chills.  She has been following up with Dr. Butler and a culture was done of her right ear on January 29, 2019 and grew MRSE which is resistant to clindamycin, Bactrim and doxycycline.  Patient has multiple antibiotic allergies including Bactrim. She was previously on clindamycin and doxycycline however due to the resistance these medications were discontinued.  She was started on p.o. Zyvox for 14-day course.  3/6/2019-patient has come back for a follow-up.  The discharge from the ear has stopped.  Still has some sinus discharge.  It is not green anymore it is clear.  Denies any fevers denies any chills.  Denies any nausea vomiting.  Does complain of some diarrhea.  Apparently there was a \"stomach bug\" going around in the house.  Overall feels much improved.  5/7/2019-patient has come back for follow-up. She started having drainage again from her ear. She started zyvox but broke out in a rash. She is allergic to all classes of antibiotics. She is also reporting a dry  cough since december and sinus congestion for 1 month. She also reports that the drainage from her ear had returned and the cultures were Candida. She has been following up with her ENT and she was prescribed topical drops which she has not taken yet. Denies any fevers at this time . Denies any " "N<V<D.  10/14/2019 since her last visit she has followed up with her PMD.  She has also been to the emergency room for dysuria.  She was seen by her primary on 9/25/2019 and was given Diflucan and subsequently Flagyl.  Her urine culture was positive for Gardnerella vaginalis.  Apparently she developed hives with Flagyl.  Since she last saw me her right maxillary sinus culture has shown mold on 7/5/2019.  In view of all these issues she was advised to come back to see infectious diseases. C/o sinus congestion. C/o low grade fevers. Was  treated with zithromax. She is allergic to azoles as well. She c/o vaginal irritation.    Primary Care Provider: Benson Ramirez M.D.     REVIEW OF SYSTEMS:    As documented in my HPI    ALLERGIES:    Allergies   Allergen Reactions   • Bactrim [Sulfamethoxazole W-Trimethoprim] Shortness of Breath   • Bee Venom Anaphylaxis   • Contrast Media With Iodine [Iodine] Shortness of Breath   • Econazole Anaphylaxis   • Floxin [Ofloxacin] Anaphylaxis, Shortness of Breath and Swelling     Cause throat swelling and difficulty breathing   • Gadolinium Derivatives Hives and Swelling     Throat swelling   • Keflex Shortness of Breath   • Levaquin Shortness of Breath     anaphlyaxis   • Levofloxacin Shortness of Breath   • Morphine Anaphylaxis   • Naloxone Hives     \"hives, SOB\"   • Nitrofurantoin Shortness of Breath     ...and hives     • Norco [Apap-Fd&C Yellow #10 [place]-Hydrocodone] Shortness of Breath     ...and hives     • Oxycodone Shortness of Breath     ...and hives     • Penicillins Shortness of Breath     ...and hives     • Tape    • Ancef [Cefazolin] Hives   • Bextra [Valdecoxib] Rash     \"Skin burn and peeling.\"   • Flagyl [Kdc:Metronidazole+Tartrazine] Hives   • Linezolid Rash     Rash all over body   • Other Drug Hives     \"Enconazole nitrate.\" shortness of breath and hives   • Other Misc Unspecified     Adhesive tape: blisters, skin peels off   • Buprenorphine Anaphylaxis   • " Clindamycin      HIVES     • Tygacil [Tigecycline]      itching   • Zithromax [Azithromycin] Hives and Shortness of Breath     Pt had Hives also        PAST MEDICAL HISTORY:   Past Medical History:   Diagnosis Date   • AAA (abdominal aortic aneurysm) ([facility])    • Asthma    • Chronic pain    • Congestive heart failure ([facility])     Cardiologist, Dr. Carlson   • Fibromyalgia    • GERD (gastroesophageal reflux disease)    • Hypertension    • Migraine    • Osteoporosis    • Renal disorder     CKD   • Urticaria        PAST SURGICAL HISTORY:    Past Surgical History:   Procedure Laterality Date   • SHOULDER DECOMPRESSION ARTHROSCOPIC Left 2/19/2019    Procedure: SHOULDER DECOMPRESSION ARTHROSCOPIC - SUBACROMIAL;  Surgeon: Camila Elkins M.D.;  Location: [facility];  Service: Orthopedics   • CLAVICLE DISTAL EXCISION Left 2/19/2019    Procedure: CLAVICLE DISTAL EXCISION;  Surgeon: Camila Elkisn M.D.;  Location: [facility];  Service: Orthopedics   • SHOULDER ARTHROSCOPY W/ BICIPITAL TENODESIS REPAIR Left 2/19/2019    Procedure: SHOULDER ARTHROSCOPY W/ BICIPITAL TENODESIS REPAIR;  Surgeon: Camila Elkins M.D.;  Location: [facility];  Service: Orthopedics   • GASTROSCOPY N/A 2/7/2019    Procedure: GASTROSCOPY- DIALATION AND BIOPSY;  Surgeon: Hola Veliz M.D.;  Location: [facility];  Service: Gastroenterology   • ABDOMINAL EXPLORATION  2002   • GASTRIC BYPASS LAPAROSCOPIC  1999   • HYSTERECTOMY, TOTAL ABDOMINAL  1995   • APPENDECTOMY  1974        MEDICATIONS:   Current Outpatient Medications   Medication Sig Dispense Refill   • metronidazole (METROGEL) 0.75 % gel Use intravaginally BID 1 Tube 1   • FARXIGA 5 MG Tab      • calcitonin, salmon, (MIACALCIN) 200 UNIT/ACT Solution   12   • meloxicam (MOBIC) 15 MG tablet Take 15 mg by mouth every day.     • losartan (COZAAR) 25 MG Tab TAKE 1 TABLET BY MOUTH EVERY DAY 90 Tab 3   • tizanidine (ZANAFLEX) 4 MG Tab  TAKE 1 TABLET BY MOUTH EVERY 6 HOURS AS NEEDED 180 Tab 3   • clotrimazole (LOTRIMIN) 1 % Solution Apply  to affected area(s) 2 times a day. ankle     • Diclofenac Sodium 1 % Gel as needed.     • triamcinolone (KENALOG) 0.1 % lotion Apply  to affected area(s) 2 Times a Day.     • Cyanocobalamin (B-12) 1000 MCG/ML Kit by Injection route every 7 days.     • colesevelam (WELCHOL) 625 MG Tab Take  by mouth every day.     • EPINEPHrine (EPIPEN) 0.3 MG/0.3ML Solution Auto-injector solution for injection      • pilocarpine (SALAGEN) 5 MG Tab Take 5 mg by mouth 2 Times a Day.     • Frovatriptan Succinate 2.5 MG Tab TAKE 1 TABLET BY MOUTH EVERY DAY AS NEEDED 10 Tab 3   • cevimeline (EVOXAC) 30 MG capsule Take 1 Cap by mouth 3 times a day. 270 Cap 0   • albuterol 108 (90 Base) MCG/ACT Aero Soln inhalation aerosol Inhale 2 Puffs by mouth every 6 hours as needed for Shortness of Breath.     • cetirizine (KLS ALLER-HAWA) 10 MG Tab Take 40 mg by mouth every day.     • furosemide (LASIX) 20 MG Tab Take 10 mg by mouth 2 times a day.     • liothyronine (CYTOMEL) 25 MCG Tab Take 25 mcg by mouth every evening.     • raNITidine (ZANTAC) 150 MG Tab Take 300 mg by mouth 2 times a day.     • AIMOVIG 70 MG/ML Solution Auto-injector 1 mL by Injection route Q30 DAYS.     • estradiol (ESTRACE) 0.5 MG tablet Take 0.5 mg by mouth every day.     • ferrous sulfate 325 (65 Fe) MG tablet Take 325 mg by mouth every day.     • SYNTHROID 75 MCG Tab Take 75 mcg by mouth Every morning on an empty stomach.     • potassium chloride SA (KDUR) 20 MEQ Tab CR Take 1 Tab by mouth every day. 30 Tab 1   • pantoprazole (PROTONIX) 40 MG Tablet Delayed Response take 1 tablet by mouth twice daily  2   • gabapentin (NEURONTIN) 400 MG Cap Take 1 Cap by mouth 3 times a day. 180 Cap 3   • DULoxetine (CYMBALTA) 60 MG Cap DR Particles delayed-release capsule Take 1 Cap by mouth every day. 90 Cap 3   • Coral Calcium 1000 (390 Ca) MG Tab Take 1 Tab by mouth every day.     •  "magnesium oxide (MAG-OX) 400 MG Tab Take 400 mg by mouth every evening.     • Biotin 5000 MCG Cap Take 1 Cap by mouth every day.     • Cholecalciferol (VITAMIN D) 2000 units Cap Take 1 Cap by mouth every day.     • Probiotic Product (PROBIOTIC PO) Take 1 Tab by mouth every day.     • Menaquinone-7 (VITAMIN K2) 100 MCG Cap Take 1 Tab by mouth every day.     • multivitamin (THERAGRAN) Tab Take 1 Tab by mouth every day.     • furosemide (LASIX) 20 MG Tab TAKE 0.5 TABS BY MOUTH 2 TIMES A DAY. 90 Tab 1   • montelukast (SINGULAIR) 10 MG Tab TAKE 1 TABLET BY MOUTH EVERY DAY 90 Tab 3     No current facility-administered medications for this visit.         LABORATORY DATA: Culture MRSE , mold     PHYSICAL EXAMINATION:PE:      /88 (BP Location: Left arm, Patient Position: Sitting, BP Cuff Size: Adult)   Pulse (!) 115   Temp 36.4 °C (97.6 °F) (Temporal)   Ht 1.626 m (5' 4\")   Wt 82.6 kg (182 lb)   LMP 02/07/2016 (Within Months)   SpO2 97%   Breastfeeding? No   BMI 31.24 kg/m²        Constitutional: patient is oriented to person, place, and time. appears well-developed and well-nourished. No distress  Eyes: Conjunctivae normal and EOM are normal. Pupils are equal, round, and reactive to light.   ENT: Lips without lesions, good dentition, oropharynx is clear and moist.  Right ear has a perforation. Sinus tenderness on the maxillary area.  Neck: Trachea midline. Normal range of motion. Neck supple. No masses. no lymphadenopathy.  Cardiovascular: Normal rate, regular rhythm, normal heart sounds and intact distal pulses. No murmur, gallop, or friction rub. No edema.  Pulmonary/Chest: No respiratory distress. Unlabored respiratory effort, lungs clear to auscultation. No wheezes or rales.   Abdominal: Soft, non tender. BS + x 4. No masses or hepatosplenomegaly.   Musculoskeletal: Left arm in sling  Neurological:  alert and oriented to person, place, and time. No cranial nerve deficit. Coordination normal.   Skin: Skin " is warm and dry. Good turgor. No rashes visable.  Psychiatric: anxious       ASSESSMENT:  1. Chronic rhinitis     2. Multiple allergies          RECOMMENDATIONS:      Patient is allergic to almost all antibiotic classes. She has been seeing the immunologist as well.   For her mould as well there is no treatment option. She tells me she had severe reaction to diflucan. I do see an azole documented in her allergies.    She has seen allergist as well.  For BV we will give her metrogel topically. She was advised to stop it if she develops a reaction.  For her sinuses and desensitization I would recommend her to be referred to [place].   I will discuss this option with her PMD  No follow-ups on file.

## 2019-10-17 ENCOUNTER — HOSPITAL ENCOUNTER (OUTPATIENT)
Dept: LAB | Facility: MEDICAL CENTER | Age: 54
End: 2019-10-17
Attending: OTOLARYNGOLOGY
Payer: MEDICARE

## 2019-10-17 LAB
GRAM STN SPEC: NORMAL
SIGNIFICANT IND 70042: NORMAL
SITE SITE: NORMAL
SOURCE SOURCE: NORMAL

## 2019-10-17 PROCEDURE — 87070 CULTURE OTHR SPECIMN AEROBIC: CPT

## 2019-10-17 PROCEDURE — 87075 CULTR BACTERIA EXCEPT BLOOD: CPT

## 2019-10-17 PROCEDURE — 87186 SC STD MICRODIL/AGAR DIL: CPT

## 2019-10-17 PROCEDURE — 87205 SMEAR GRAM STAIN: CPT

## 2019-10-17 PROCEDURE — 87077 CULTURE AEROBIC IDENTIFY: CPT

## 2019-10-18 ENCOUNTER — HOSPITAL ENCOUNTER (OUTPATIENT)
Dept: LAB | Facility: MEDICAL CENTER | Age: 54
End: 2019-10-18
Attending: INTERNAL MEDICINE
Payer: MEDICARE

## 2019-10-18 LAB
25(OH)D3 SERPL-MCNC: 26 NG/ML (ref 30–100)
ALBUMIN SERPL BCP-MCNC: 4.4 G/DL (ref 3.2–4.9)
ALBUMIN/GLOB SERPL: 1.7 G/DL
ALP SERPL-CCNC: 119 U/L (ref 30–99)
ALT SERPL-CCNC: 69 U/L (ref 2–50)
ANION GAP SERPL CALC-SCNC: 8 MMOL/L (ref 0–11.9)
AST SERPL-CCNC: 67 U/L (ref 12–45)
BILIRUB SERPL-MCNC: 0.4 MG/DL (ref 0.1–1.5)
BUN SERPL-MCNC: 18 MG/DL (ref 8–22)
CALCIUM SERPL-MCNC: 9.2 MG/DL (ref 8.5–10.5)
CHLORIDE SERPL-SCNC: 104 MMOL/L (ref 96–112)
CO2 SERPL-SCNC: 26 MMOL/L (ref 20–33)
CREAT SERPL-MCNC: 1.13 MG/DL (ref 0.5–1.4)
CREAT UR-MCNC: 65 MG/DL
GLOBULIN SER CALC-MCNC: 2.6 G/DL (ref 1.9–3.5)
GLUCOSE SERPL-MCNC: 95 MG/DL (ref 65–99)
PHOSPHATE SERPL-MCNC: 3.4 MG/DL (ref 2.5–4.5)
POTASSIUM SERPL-SCNC: 4.5 MMOL/L (ref 3.6–5.5)
PROT SERPL-MCNC: 7 G/DL (ref 6–8.2)
PTH-INTACT SERPL-MCNC: 76.1 PG/ML (ref 14–72)
SODIUM SERPL-SCNC: 138 MMOL/L (ref 135–145)
T3FREE SERPL-MCNC: 4.28 PG/ML (ref 2.4–4.2)
T4 FREE SERPL-MCNC: 0.62 NG/DL (ref 0.53–1.43)
TSH SERPL DL<=0.005 MIU/L-ACNC: 1.67 UIU/ML (ref 0.38–5.33)

## 2019-10-18 PROCEDURE — 83970 ASSAY OF PARATHORMONE: CPT

## 2019-10-18 PROCEDURE — 82570 ASSAY OF URINE CREATININE: CPT

## 2019-10-18 PROCEDURE — 83520 IMMUNOASSAY QUANT NOS NONAB: CPT

## 2019-10-18 PROCEDURE — 36415 COLL VENOUS BLD VENIPUNCTURE: CPT

## 2019-10-18 PROCEDURE — 82652 VIT D 1 25-DIHYDROXY: CPT | Mod: GA

## 2019-10-18 PROCEDURE — 84443 ASSAY THYROID STIM HORMONE: CPT

## 2019-10-18 PROCEDURE — 84439 ASSAY OF FREE THYROXINE: CPT

## 2019-10-18 PROCEDURE — 82306 VITAMIN D 25 HYDROXY: CPT | Mod: GA

## 2019-10-18 PROCEDURE — 80053 COMPREHEN METABOLIC PANEL: CPT

## 2019-10-18 PROCEDURE — 84100 ASSAY OF PHOSPHORUS: CPT

## 2019-10-18 PROCEDURE — 82340 ASSAY OF CALCIUM IN URINE: CPT

## 2019-10-18 PROCEDURE — 84481 FREE ASSAY (FT-3): CPT

## 2019-10-18 PROCEDURE — 84105 ASSAY OF URINE PHOSPHORUS: CPT

## 2019-10-18 PROCEDURE — 83945 ASSAY OF OXALATE: CPT

## 2019-10-19 ENCOUNTER — HOSPITAL ENCOUNTER (EMERGENCY)
Facility: MEDICAL CENTER | Age: 54
End: 2019-10-19
Attending: EMERGENCY MEDICINE
Payer: MEDICARE

## 2019-10-19 VITALS
OXYGEN SATURATION: 99 % | RESPIRATION RATE: 16 BRPM | HEART RATE: 94 BPM | HEIGHT: 64 IN | BODY MASS INDEX: 31.95 KG/M2 | DIASTOLIC BLOOD PRESSURE: 72 MMHG | SYSTOLIC BLOOD PRESSURE: 106 MMHG | TEMPERATURE: 98.5 F | WEIGHT: 187.17 LBS

## 2019-10-19 DIAGNOSIS — B96.89 BACTERIAL VAGINOSIS: ICD-10-CM

## 2019-10-19 DIAGNOSIS — J32.9 CHRONIC SINUSITIS, UNSPECIFIED LOCATION: ICD-10-CM

## 2019-10-19 DIAGNOSIS — N76.0 BACTERIAL VAGINOSIS: ICD-10-CM

## 2019-10-19 PROCEDURE — 99282 EMERGENCY DEPT VISIT SF MDM: CPT

## 2019-10-19 SDOH — HEALTH STABILITY: MENTAL HEALTH: HOW OFTEN DO YOU HAVE A DRINK CONTAINING ALCOHOL?: MONTHLY OR LESS

## 2019-10-19 ASSESSMENT — LIFESTYLE VARIABLES: DO YOU DRINK ALCOHOL: NO

## 2019-10-20 NOTE — ED PROVIDER NOTES
ED Provider Note    CHIEF COMPLAINT  Chief Complaint   Patient presents with   • Vaginitis     vaginal infection x 4 weeks  sinus infection since december  she is allergic to all PO antibiotics  Has been seeing ENT and may have MERSA in sinuses       HPI  Donna Isaac is a 54 y.o. female who presents to the emergency department complaining of ongoing symptoms of vaginitis and sinusitis.  The patient has a long history of sinus and ear infections and a very extensive list of allergies.  She has been evaluated by ENT, immunology and infectious disease and she has basically run out of treatment options.  The patient was diagnosed with a bacterial vaginitis recently and she was seen by Dr. Alford in the infectious disease clinic 4 days ago and given a prescription for MetroGel topical.  The patient did not start the medication stating that the pharmacist told her not to use it because she is allergic to Flagyl.  She did not discuss this with Dr. Alford.  She comes to the emergency department today stating that she is tired of being sick and was hoping we could come up with some other plan.  Dr. Alford has advised her that she needs to see a specialist in [place] and her primary care doctor needs to arrange this for her and the patient says that that has not yet been done    REVIEW OF SYSTEMS the patient has no new symptoms.    PAST MEDICAL HISTORY  Past Medical History:   Diagnosis Date   • AAA (abdominal aortic aneurysm) ([facility])    • Asthma    • Chronic pain    • Congestive heart failure ([facility])     Cardiologist, Dr. Carlson   • Fibromyalgia    • GERD (gastroesophageal reflux disease)    • Hypertension    • Migraine    • Osteoporosis    • Renal disorder     CKD   • Urticaria        FAMILY HISTORY  Family History   Problem Relation Age of Onset   • Heart Disease Mother    • Diabetes Mother    • Heart Failure Mother    • Hypertension Mother    • Hypertension Father    • Heart Disease Brother    • Heart  Attack Brother    • Hypertension Brother        SOCIAL HISTORY  Social History     Socioeconomic History   • Marital status:      Spouse name: Not on file   • Number of children: Not on file   • Years of education: Not on file   • Highest education level: Not on file   Occupational History   • Not on file   Social Needs   • Financial resource strain: Not on file   • Food insecurity:     Worry: Not on file     Inability: Not on file   • Transportation needs:     Medical: Not on file     Non-medical: Not on file   Tobacco Use   • Smoking status: Former Smoker   • Smokeless tobacco: Never Used   Substance and Sexual Activity   • Alcohol use: Yes     Frequency: Monthly or less     Comment: 1-2 /month    • Drug use: No   • Sexual activity: Not on file   Lifestyle   • Physical activity:     Days per week: Not on file     Minutes per session: Not on file   • Stress: Not on file   Relationships   • Social connections:     Talks on phone: Not on file     Gets together: Not on file     Attends Evangelical service: Not on file     Active member of club or organization: Not on file     Attends meetings of clubs or organizations: Not on file     Relationship status: Not on file   • Intimate partner violence:     Fear of current or ex partner: Not on file     Emotionally abused: Not on file     Physically abused: Not on file     Forced sexual activity: Not on file   Other Topics Concern   • Not on file   Social History Narrative   • Not on file       SURGICAL HISTORY  Past Surgical History:   Procedure Laterality Date   • SHOULDER DECOMPRESSION ARTHROSCOPIC Left 2/19/2019    Procedure: SHOULDER DECOMPRESSION ARTHROSCOPIC - SUBACROMIAL;  Surgeon: Camila Elkins M.D.;  Location: SURGERY [facility];  Service: Orthopedics   • CLAVICLE DISTAL EXCISION Left 2/19/2019    Procedure: CLAVICLE DISTAL EXCISION;  Surgeon: Camila Elkins M.D.;  Location: SURGERY [facility];  Service: Orthopedics   • SHOULDER  "ARTHROSCOPY W/ BICIPITAL TENODESIS REPAIR Left 2/19/2019    Procedure: SHOULDER ARTHROSCOPY W/ BICIPITAL TENODESIS REPAIR;  Surgeon: Camila Elkins M.D.;  Location: SURGERY [facility];  Service: Orthopedics   • GASTROSCOPY N/A 2/7/2019    Procedure: GASTROSCOPY- DIALATION AND BIOPSY;  Surgeon: Hola Veliz M.D.;  Location: SURGERY [facility];  Service: Gastroenterology   • ABDOMINAL EXPLORATION  2002   • GASTRIC BYPASS LAPAROSCOPIC  1999   • HYSTERECTOMY, TOTAL ABDOMINAL  1995   • APPENDECTOMY  1974       CURRENT MEDICATIONS  Home Medications    **Home medications have not yet been reviewed for this encounter**         ALLERGIES  Allergies   Allergen Reactions   • Bactrim [Sulfamethoxazole W-Trimethoprim] Shortness of Breath   • Bee Venom Anaphylaxis   • Contrast Media With Iodine [Iodine] Shortness of Breath   • Econazole Anaphylaxis   • Floxin [Ofloxacin] Anaphylaxis, Shortness of Breath and Swelling     Cause throat swelling and difficulty breathing   • Gadolinium Derivatives Hives and Swelling     Throat swelling   • Keflex Shortness of Breath   • Levaquin Shortness of Breath     anaphlyaxis   • Levofloxacin Shortness of Breath   • Morphine Anaphylaxis   • Naloxone Hives     \"hives, SOB\"   • Nitrofurantoin Shortness of Breath     ...and hives     • Norco [Apap-Fd&C Yellow #10 Al Tai-Hydrocodone] Shortness of Breath     ...and hives     • Oxycodone Shortness of Breath     ...and hives     • Penicillins Shortness of Breath     ...and hives     • Tape    • Ancef [Cefazolin] Hives   • Bextra [Valdecoxib] Rash     \"Skin burn and peeling.\"   • Flagyl [Kdc:Metronidazole+Tartrazine] Hives   • Linezolid Rash     Rash all over body   • Other Drug Hives     \"Enconazole nitrate.\" shortness of breath and hives   • Other Misc Unspecified     Adhesive tape: blisters, skin peels off   • Buprenorphine Anaphylaxis   • Clindamycin      HIVES     • Tygacil [Tigecycline]      itching   • Zithromax [Azithromycin] " "Hives and Shortness of Breath     Pt had Hives also       PHYSICAL EXAM  VITAL SIGNS: /72   Pulse 94   Temp 36.9 °C (98.5 °F) (Temporal)   Resp 16   Ht 1.626 m (5' 4\")   Wt 84.9 kg (187 lb 2.7 oz)   LMP 02/07/2016 (Within Months)   SpO2 99%   BMI 32.13 kg/m²    Oxygen saturation is interpreted as adequate  Constitutional: Awake verbal nontoxic-appearing  HENT: Mucous membranes are moist throat clear no facial erythema or swelling no nasal discharge  Eyes: No erythema or discharge  Neck: No meningeal findings  Cardiovascular: Regular rate and rhythm  Lungs: Clear and equal bilaterally  Abdomen/Back: Soft nondistended nontender no peritoneal findings  Skin: Warm and dry  Musculoskeletal: No acute bony deformity  Neurologic: Awake verbal and moving all extremities without difficulty    CHART REVIEW  I reviewed the infectious disease note from October 14, 2019 which is now 4 days ago and this is summarized in the history of present illness above.    MEDICAL DECISION MAKING and DISPOSITION  At this point in time I have expressed to the patient I understand she is frustrated due to her chronic infections and complicating medical problems but she is nontoxic and has normal vital signs tonight.  I do not see any emergency intervention that I could take to improve her situation.  I have recommended that she talk to her primary care doctor and her infectious disease specialist as soon as possible.    IMPRESSION  1.  Chronic sinusitis  2.  Chronic vaginitis  3.  Extensive history of allergies and comorbid medical problems      Electronically signed by: Ramy Márquez, 10/19/2019 6:41 PM      "

## 2019-10-20 NOTE — DISCHARGE INSTRUCTIONS
Call your primary care doctor and see if they can hasten your referral to [place].  Also you may want to speak with Dr. Alford regarding the use of the MetroGel.

## 2019-10-20 NOTE — ED TRIAGE NOTES
She can't take any oral antibiotics because she is now allergic  She has been working with ENT for her sinus infection that she has had since last December but no antibiotics are working and now she may have MRSA in her sinuses. She has had a vaginal infection for 3-4 weeks.  Her ENT told her to go to [place] but she wanted to see if there are any IV meds she is able to take

## 2019-10-21 LAB
1,25(OH)2D3 SERPL-MCNC: 50.8 PG/ML (ref 19.9–79.3)
CALCIUM 24H UR-MCNC: 6.3 MG/DL
CALCIUM 24H UR-MRATE: NORMAL MG/D
CALCIUM/CREAT 24H UR: 103 MG/G (ref 20–300)
COLLECT DURATION TIME SPEC: NORMAL HRS
COLLECT DURATION TIME SPEC: NORMAL HRS
CREAT 24H UR-MCNC: 61 MG/DL
CREAT 24H UR-MRATE: NORMAL MG/D (ref 500–1400)
PHOSPHATE 24H UR-MCNC: <4 MG/DL
PHOSPHATE 24H UR-MRATE: NORMAL MG/D (ref 400–1300)
PHOSPHATE/CREAT 24H UR: <66 MG/G
SPECIMEN VOL ?TM UR: NORMAL ML
TOTAL VOLUME 1105: NORMAL ML

## 2019-10-24 LAB
COLLECT DURATION TIME SPEC: NORMAL H
CREAT 24H UR-MCNC: 61 MG/DL
OXALATE 24H UR-MCNC: 15 MG/L
OXALATE 24H UR-MRATE: NORMAL MG/D (ref 13–40)
TOTAL VOLUME 1105: NORMAL ML

## 2019-10-29 LAB — FGF-23 PLAS-ACNC: 844 RU/ML

## 2019-10-30 ENCOUNTER — OFFICE VISIT (OUTPATIENT)
Dept: NEUROLOGY | Facility: MEDICAL CENTER | Age: 54
End: 2019-10-30
Payer: MEDICARE

## 2019-10-30 VITALS
DIASTOLIC BLOOD PRESSURE: 80 MMHG | OXYGEN SATURATION: 95 % | HEART RATE: 122 BPM | SYSTOLIC BLOOD PRESSURE: 114 MMHG | TEMPERATURE: 97.7 F | BODY MASS INDEX: 31.38 KG/M2 | HEIGHT: 64 IN | WEIGHT: 183.8 LBS

## 2019-10-30 DIAGNOSIS — G43.709 CHRONIC MIGRAINE WITHOUT AURA WITHOUT STATUS MIGRAINOSUS, NOT INTRACTABLE: ICD-10-CM

## 2019-10-30 PROCEDURE — 99214 OFFICE O/P EST MOD 30 MIN: CPT | Performed by: PSYCHIATRY & NEUROLOGY

## 2019-10-30 ASSESSMENT — ENCOUNTER SYMPTOMS
SHORTNESS OF BREATH: 0
ABDOMINAL PAIN: 0
WEIGHT LOSS: 0
FALLS: 0
FEVER: 0
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
## 2019-11-03 ENCOUNTER — HOSPITAL ENCOUNTER (EMERGENCY)
Facility: MEDICAL CENTER | Age: 54
DRG: 291 | End: 2019-11-03
Attending: EMERGENCY MEDICINE
Payer: MEDICARE

## 2019-11-03 VITALS
DIASTOLIC BLOOD PRESSURE: 120 MMHG | BODY MASS INDEX: 31.8 KG/M2 | HEART RATE: 77 BPM | TEMPERATURE: 97 F | OXYGEN SATURATION: 99 % | RESPIRATION RATE: 18 BRPM | SYSTOLIC BLOOD PRESSURE: 233 MMHG | HEIGHT: 64 IN | WEIGHT: 186.29 LBS

## 2019-11-03 DIAGNOSIS — T78.40XA ALLERGIC REACTION TO DRUG, INITIAL ENCOUNTER: ICD-10-CM

## 2019-11-03 DIAGNOSIS — L50.0 ALLERGIC URTICARIA: ICD-10-CM

## 2019-11-03 LAB
ANION GAP SERPL CALC-SCNC: 16 MMOL/L (ref 0–11.9)
BASOPHILS # BLD AUTO: 0.3 % (ref 0–1.8)
BASOPHILS # BLD: 0.01 K/UL (ref 0–0.12)
BUN SERPL-MCNC: 14 MG/DL (ref 8–22)
CALCIUM SERPL-MCNC: 9.6 MG/DL (ref 8.4–10.2)
CHLORIDE SERPL-SCNC: 105 MMOL/L (ref 96–112)
CO2 SERPL-SCNC: 22 MMOL/L (ref 20–33)
CREAT SERPL-MCNC: 1.07 MG/DL (ref 0.5–1.4)
EOSINOPHIL # BLD AUTO: 0.27 K/UL (ref 0–0.51)
EOSINOPHIL NFR BLD: 7.5 % (ref 0–6.9)
ERYTHROCYTE [DISTWIDTH] IN BLOOD BY AUTOMATED COUNT: 46.9 FL (ref 35.9–50)
GLUCOSE SERPL-MCNC: 103 MG/DL (ref 65–99)
HCT VFR BLD AUTO: 45.2 % (ref 37–47)
HGB BLD-MCNC: 14 G/DL (ref 12–16)
IMM GRANULOCYTES # BLD AUTO: 0.01 K/UL (ref 0–0.11)
IMM GRANULOCYTES NFR BLD AUTO: 0.3 % (ref 0–0.9)
LACTATE BLD-SCNC: 1.9 MMOL/L (ref 0.5–2)
LYMPHOCYTES # BLD AUTO: 1.39 K/UL (ref 1–4.8)
LYMPHOCYTES NFR BLD: 38.5 % (ref 22–41)
MCH RBC QN AUTO: 28 PG (ref 27–33)
MCHC RBC AUTO-ENTMCNC: 31 G/DL (ref 33.6–35)
MCV RBC AUTO: 90.4 FL (ref 81.4–97.8)
MONOCYTES # BLD AUTO: 0.38 K/UL (ref 0–0.85)
MONOCYTES NFR BLD AUTO: 10.5 % (ref 0–13.4)
NEUTROPHILS # BLD AUTO: 1.55 K/UL (ref 2–7.15)
NEUTROPHILS NFR BLD: 42.9 % (ref 44–72)
NRBC # BLD AUTO: 0 K/UL
NRBC BLD-RTO: 0 /100 WBC
PLATELET # BLD AUTO: 241 K/UL (ref 164–446)
PMV BLD AUTO: 10.8 FL (ref 9–12.9)
POTASSIUM SERPL-SCNC: 3.6 MMOL/L (ref 3.6–5.5)
RBC # BLD AUTO: 5 M/UL (ref 4.2–5.4)
SODIUM SERPL-SCNC: 143 MMOL/L (ref 135–145)
WBC # BLD AUTO: 3.6 K/UL (ref 4.8–10.8)

## 2019-11-03 PROCEDURE — 96375 TX/PRO/DX INJ NEW DRUG ADDON: CPT

## 2019-11-03 PROCEDURE — 96374 THER/PROPH/DIAG INJ IV PUSH: CPT

## 2019-11-03 PROCEDURE — 700111 HCHG RX REV CODE 636 W/ 250 OVERRIDE (IP)

## 2019-11-03 PROCEDURE — 85025 COMPLETE CBC W/AUTO DIFF WBC: CPT

## 2019-11-03 PROCEDURE — 80048 BASIC METABOLIC PNL TOTAL CA: CPT

## 2019-11-03 PROCEDURE — 99284 EMERGENCY DEPT VISIT MOD MDM: CPT

## 2019-11-03 PROCEDURE — 83605 ASSAY OF LACTIC ACID: CPT

## 2019-11-03 RX ORDER — METHYLPREDNISOLONE SODIUM SUCCINATE 125 MG/2ML
INJECTION, POWDER, LYOPHILIZED, FOR SOLUTION INTRAMUSCULAR; INTRAVENOUS
Status: COMPLETED
Start: 2019-11-03 | End: 2019-11-03

## 2019-11-03 RX ORDER — METHYLPREDNISOLONE SODIUM SUCCINATE 125 MG/2ML
125 INJECTION, POWDER, LYOPHILIZED, FOR SOLUTION INTRAMUSCULAR; INTRAVENOUS ONCE
Status: COMPLETED | OUTPATIENT
Start: 2019-11-03 | End: 2019-11-03

## 2019-11-03 RX ORDER — DIPHENHYDRAMINE HYDROCHLORIDE 50 MG/ML
INJECTION INTRAMUSCULAR; INTRAVENOUS
Status: COMPLETED
Start: 2019-11-03 | End: 2019-11-03

## 2019-11-03 RX ORDER — DIPHENHYDRAMINE HYDROCHLORIDE 50 MG/ML
25 INJECTION INTRAMUSCULAR; INTRAVENOUS ONCE
Status: COMPLETED | OUTPATIENT
Start: 2019-11-03 | End: 2019-11-03

## 2019-11-03 RX ADMIN — METHYLPREDNISOLONE SODIUM SUCCINATE 125 MG: 125 INJECTION, POWDER, LYOPHILIZED, FOR SOLUTION INTRAMUSCULAR; INTRAVENOUS at 02:38

## 2019-11-03 RX ADMIN — DIPHENHYDRAMINE HYDROCHLORIDE 25 MG: 50 INJECTION INTRAMUSCULAR; INTRAVENOUS at 02:39

## 2019-11-03 RX ADMIN — FAMOTIDINE 20 MG: 10 INJECTION, SOLUTION INTRAVENOUS at 02:33

## 2019-11-03 RX ADMIN — DIPHENHYDRAMINE HYDROCHLORIDE 25 MG: 50 INJECTION, SOLUTION INTRAMUSCULAR; INTRAVENOUS at 02:39

## 2019-11-03 RX ADMIN — METHYLPREDNISOLONE SODIUM SUCCINATE 125 MG: 125 INJECTION, POWDER, FOR SOLUTION INTRAMUSCULAR; INTRAVENOUS at 02:38

## 2019-11-03 NOTE — ED TRIAGE NOTES
"Chief Complaint   Patient presents with   • Allergic Reaction     Pt reports \"flu-like\" symptoms with hives startins Tuesday. Pt administered her Epi-pen. Pt reprots fevers of 103-106F. Pt reports hives returned on Thursday and she administered a second epi-pen. Pt started Paricalcitol 1mcg on Saturday and feels like her symptoms could be an allergic reaction to the medication. Pt reports slight difficulty with swallowing. Pt taking benadryl and Tylenol since Tuesday to help manage symptoms with minimal relief.       "

## 2019-11-03 NOTE — ED PROVIDER NOTES
"ED Provider Note    ED Provider Note    Scribed for Radha Silva MD by Radha Silva. 11/3/2019, 3:34 AM.    Primary care provider: Benson Ramirez M.D.  Means of arrival: Private  History obtained from: Patient  History limited by: None    CHIEF COMPLAINT  Chief Complaint   Patient presents with   • Allergic Reaction     Pt reports \"flu-like\" symptoms with hives startins Tuesday. Pt administered her Epi-pen. Pt reprots fevers of 103-106F. Pt reports hives returned on Thursday and she administered a second epi-pen. Pt started Paricalcitol 1mcg on Saturday and feels like her symptoms could be an allergic reaction to the medication. Pt reports slight difficulty with swallowing. Pt taking benadryl and Tylenol since Tuesday to help manage symptoms with minimal relief.       HPI  54-year-old female with history of CHF presents for evaluation of potential allergic reaction.  Patient relates on Sunday she started paricalcitol for hyperparathyroidism.  She relates starting on Tuesday she began having itchy rash, initially involving both of her arms now involving the anterior and posterior trunk.  She notes at that time she felt sensation of dyspnea and had painful swallowing, also relates nausea and vomiting.  Certainly there is concern by that history for anaphylaxis, patient has multiple allergic reactions to medications and does have an EpiPen and relates she used that on Tuesday with significant improvement.  She notes over recurrence of symptoms on Wednesday, albeit no dyspnea at the time is still painful swallowing, nausea, rash, noted she self treated again with her EpiPen.  She relates she is using Benadryl as well with only transient improvement.  No other new exposures elicited, no new pets, no new detergents, no new soaps.  Patient currently is complaining only of a rash, noting dyspnea has resolved, no active vomiting, no active sensation of lip or tongue or throat swelling.  Patient does relate " however on Tuesday as well she had a fever of 103, this is also since resolved, she is currently afebrile.     REVIEW OF SYSTEMS  Pertinent positives include initial dyspnea, nausea, persistent rash. Pertinent negatives include no acute dyspnea, no acute tongue or throat or lip swelling, no acute vomiting.  All other systems reviewed and negative.    PAST MEDICAL HISTORY   has a past medical history of AAA (abdominal aortic aneurysm) (HCC), Asthma, Chronic pain, Congestive heart failure (HCC), Fibromyalgia, GERD (gastroesophageal reflux disease), Hypertension, Migraine, Osteoporosis, Renal disorder, and Urticaria.    SURGICAL HISTORY   has a past surgical history that includes appendectomy (1974); gastroscopy (N/A, 2/7/2019); hysterectomy, total abdominal (1995); gastric bypass laparoscopic (1999); abdominal exploration (2002); shoulder decompression arthroscopic (Left, 2/19/2019); clavicle distal excision (Left, 2/19/2019); and shoulder arthroscopy w/ bicipital tenodesis repair (Left, 2/19/2019).    SOCIAL HISTORY  Social History     Tobacco Use   • Smoking status: Former Smoker   • Smokeless tobacco: Never Used   Substance Use Topics   • Alcohol use: Yes     Frequency: Monthly or less     Comment: 1-2 /month    • Drug use: No      Social History     Substance and Sexual Activity   Drug Use No       FAMILY HISTORY  Family History   Problem Relation Age of Onset   • Heart Disease Mother    • Diabetes Mother    • Heart Failure Mother    • Hypertension Mother    • Hypertension Father    • Heart Disease Brother    • Heart Attack Brother    • Hypertension Brother        CURRENT MEDICATIONS  Home Medications     Reviewed by Nell Lopez R.N. (Registered Nurse) on 11/03/19 at 0341  Med List Status: Partial   Medication Last Dose Status   AIMOVIG 70 MG/ML Solution Auto-injector  Active   albuterol 108 (90 Base) MCG/ACT Aero Soln inhalation aerosol  Active   Biotin 5000 MCG Cap  Active   calcitonin, salmon, (MIACALCIN) 200  "UNIT/ACT Solution  Active   cetirizine (KLS ALLER-HAWA) 10 MG Tab  Active   cevimeline (EVOXAC) 30 MG capsule  Active   Cholecalciferol (VITAMIN D) 2000 units Cap  Active   clotrimazole (LOTRIMIN) 1 % Solution  Active   colesevelam (WELCHOL) 625 MG Tab  Active   Coral Calcium 1000 (390 Ca) MG Tab  Active   Cyanocobalamin (B-12) 1000 MCG/ML Kit  Active   DULoxetine (CYMBALTA) 60 MG Cap DR Particles delayed-release capsule  Active   EPINEPHrine (EPIPEN) 0.3 MG/0.3ML Solution Auto-injector solution for injection  Active   estradiol (ESTRACE) 0.5 MG tablet  Active   FARXIGA 5 MG Tab  Active   Frovatriptan Succinate 2.5 MG Tab  Active   furosemide (LASIX) 20 MG Tab  Active   gabapentin (NEURONTIN) 400 MG Cap  Active   liothyronine (CYTOMEL) 25 MCG Tab  Active   losartan (COZAAR) 25 MG Tab  Active   magnesium oxide (MAG-OX) 400 MG Tab  Active   meloxicam (MOBIC) 15 MG tablet  Active   Menaquinone-7 (VITAMIN K2) 100 MCG Cap  Active   montelukast (SINGULAIR) 10 MG Tab  Active   multivitamin (THERAGRAN) Tab  Active   pantoprazole (PROTONIX) 40 MG Tablet Delayed Response  Active   potassium chloride SA (KDUR) 20 MEQ Tab CR  Active   Probiotic Product (PROBIOTIC PO)  Active   raNITidine (ZANTAC) 150 MG Tab  Active   SYNTHROID 75 MCG Tab  Active   tizanidine (ZANAFLEX) 4 MG Tab  Active                ALLERGIES  Allergies   Allergen Reactions   • Bactrim [Sulfamethoxazole W-Trimethoprim] Shortness of Breath   • Bee Venom Anaphylaxis   • Econazole Anaphylaxis   • Floxin [Ofloxacin] Anaphylaxis, Shortness of Breath and Swelling     Cause throat swelling and difficulty breathing   • Gadolinium Derivatives Hives and Swelling     Throat swelling   • Iodine Shortness of Breath and Anaphylaxis     Throat and tongue swelling with IV contrast   • Keflex Shortness of Breath   • Levaquin Shortness of Breath     anaphlyaxis   • Levofloxacin Shortness of Breath   • Morphine Anaphylaxis   • Naloxone Hives     \"hives, SOB\"   • Nitrofurantoin " "Shortness of Breath     ...and hives     • Norco [Apap-Fd&C Yellow #10 Al Tai-Hydrocodone] Shortness of Breath     ...and hives     • Oxycodone Shortness of Breath     ...and hives     • Penicillins Shortness of Breath     ...and hives     • Tape Contact Dermatitis     Blisters, clear tegaderm ok.. No steristrips.   • Ancef [Cefazolin] Hives   • Bextra [Valdecoxib] Rash     \"Skin burn and peeling.\"   • Flagyl [Kdc:Metronidazole+Tartrazine] Hives   • Linezolid Rash     Rash all over body   • Other Drug Hives     \"Enconazole nitrate.\" shortness of breath and hives   • Other Misc Unspecified     Adhesive tape: blisters, skin peels off   • Buprenorphine Anaphylaxis   • Clindamycin      HIVES     • Tygacil [Tigecycline]      itching   • Zithromax [Azithromycin] Hives and Shortness of Breath     Pt had Hives also       PHYSICAL EXAM  VITAL SIGNS: BP (!) 233/120   Pulse 77   Temp 36.1 °C (97 °F) (Temporal)   Resp 18   Ht 1.626 m (5' 4\")   Wt 84.5 kg (186 lb 4.6 oz)   LMP 02/07/2016 (Within Months)   SpO2 99%   BMI 31.98 kg/m²     General: Alert, no acute distress  Skin: Warm, dry, normal for ethnicity.  Blanching erythematous mildly raised discrete lesions consistent with your urticaria to the bilateral upper extremities and anterior posterior trunk, excoriated, no fluctuance, no crepitus.  Head: Normocephalic, atraumatic  Neck: Trachea midline, no tenderness  Eye: PERRL, normal conjunctiva  ENMT: Oral mucosa moist, no pharyngeal erythema or exudate.  No lip nor tongue or pharyngeal edema  Cardiovascular: Regular rate and rhythm, No murmur, Normal peripheral perfusion  Respiratory: Lungs CTA, respirations are non-labored, breath sounds are equal, no wheeze, rhonchi, no stridor.  Gastrointestinal: Soft, nontender, non distended  Musculoskeletal: No swelling, no deformity  Neurological: Alert and oriented to person, place, time, and situation.  Normal speech.  Lymphatics: No lymphadenopathy  Psychiatric: " Cooperative, moderately anxious otherwise appropriate mood & affect      DIAGNOSTIC STUDIES/PROCEDURES    LABS  Results for orders placed or performed during the hospital encounter of 11/03/19   CBC WITH DIFFERENTIAL   Result Value Ref Range    WBC 3.6 (L) 4.8 - 10.8 K/uL    RBC 5.00 4.20 - 5.40 M/uL    Hemoglobin 14.0 12.0 - 16.0 g/dL    Hematocrit 45.2 37.0 - 47.0 %    MCV 90.4 81.4 - 97.8 fL    MCH 28.0 27.0 - 33.0 pg    MCHC 31.0 (L) 33.6 - 35.0 g/dL    RDW 46.9 35.9 - 50.0 fL    Platelet Count 241 164 - 446 K/uL    MPV 10.8 9.0 - 12.9 fL    Neutrophils-Polys 42.90 (L) 44.00 - 72.00 %    Lymphocytes 38.50 22.00 - 41.00 %    Monocytes 10.50 0.00 - 13.40 %    Eosinophils 7.50 (H) 0.00 - 6.90 %    Basophils 0.30 0.00 - 1.80 %    Immature Granulocytes 0.30 0.00 - 0.90 %    Nucleated RBC 0.00 /100 WBC    Neutrophils (Absolute) 1.55 (L) 2.00 - 7.15 K/uL    Lymphs (Absolute) 1.39 1.00 - 4.80 K/uL    Monos (Absolute) 0.38 0.00 - 0.85 K/uL    Eos (Absolute) 0.27 0.00 - 0.51 K/uL    Baso (Absolute) 0.01 0.00 - 0.12 K/uL    Immature Granulocytes (abs) 0.01 0.00 - 0.11 K/uL    NRBC (Absolute) 0.00 K/uL   Basic Metabolic Panel   Result Value Ref Range    Sodium 143 135 - 145 mmol/L    Potassium 3.6 3.6 - 5.5 mmol/L    Chloride 105 96 - 112 mmol/L    Co2 22 20 - 33 mmol/L    Glucose 103 (H) 65 - 99 mg/dL    Bun 14 8 - 22 mg/dL    Creatinine 1.07 0.50 - 1.40 mg/dL    Calcium 9.6 8.4 - 10.2 mg/dL    Anion Gap 16.0 (H) 0.0 - 11.9   LACTIC ACID   Result Value Ref Range    Lactic Acid 1.9 0.5 - 2.0 mmol/L   ESTIMATED GFR   Result Value Ref Range    GFR If African American >60 >60 mL/min/1.73 m 2    GFR If Non  53 (A) >60 mL/min/1.73 m 2     All labs reviewed by me, renal function actually improved from previous.  No leukocytosis, no left shift.        COURSE & MEDICAL DECISION MAKING  Pertinent Labs & Imaging studies reviewed. (See chart for details)    0234 AM - Patient seen and examined at bedside. Patient will  "be treated with Solu-Medrol 125 mg IV, famotidine 20 mg IV (patient is self treated with diphenhydramine is prior to arrival). Ordered metabolic work-up as well as lactic acid to evaluate her symptoms. The differential diagnoses include but are not limited to: Allergic reaction, electrolyte abnormality    0252: Patient reassessed, feeling much better.  Rash is visually improved already after this point.  I have updated her with labs thus far.  Patient at baseline does have mild neutropenia, this is unchanged today, there is no left shift, white blood cells mildly low, approximately baseline per patient.  Awaiting electrolytes and lactic acid at this time.    0316: Patient reassessed, lactic acid level is also within normal limits.  She is feeling better, pruritus is improved and rash is also visually improving.  Given no tachycardia, no hypotension, no fever, no leukocytosis, no left shift, no bandemia, this would not be consistent with septicemia.  Patient does have history of hypertension has been hypertensive throughout stay, awaiting her BMP at this time to assess renal function.    Patient Vitals for the past 24 hrs:   BP Temp Temp src Pulse Resp SpO2 Height Weight   11/03/19 0400 (!) 233/120 36.1 °C (97 °F) Temporal 77 18 99 % -- --   11/03/19 0334 -- -- -- -- -- -- 1.626 m (5' 4\") 84.5 kg (186 lb 4.6 oz)   11/03/19 0244 (!) 181/106 -- -- 87 20 98 % -- --   11/03/19 0208 (!) 176/115 36.4 °C (97.5 °F) Temporal 95 20 98 % -- --     HTN/IDDM FOLLOW UP:  The patient has known hypertension and is being followed by their primary care doctor    Decision Making:  Very pleasant 54-year-old presents for evaluation of allergic type symptoms.  Patient still has a severe rash but after using her EpiPen no longer any dyspnea.  She has no evidence of lip or tongue swelling, she is on actually required her EpiPen in the last several days.  Presentation is certainly consistent with allergic reaction, will treat with steroids, " antihistamines here in the ED.  However given she relates a history of fever though she is currently afebrile I do think is reasonable to obtain a CBC and a lactic acid to evaluate for potential infectious etiology.    The patient will return for new or worsening symptoms and is stable at the time of discharge.    Patient has had high blood pressure while in the emergency department, felt likely secondary to medical condition. Counseled patient to monitor blood pressure at home and follow up with primary care physician.     DISPOSITION:  Patient will be discharged home in stable condition.    FOLLOW UP:  Benson Ramirez M.D.  [address] [address]  [phone]    Schedule an appointment as soon as possible for a visit in 2 days        OUTPATIENT MEDICATIONS:  New Prescriptions    No medications on file         FINAL IMPRESSION  1. Allergic urticaria    2. Allergic reaction to drug, initial encounter          Radha SCHAFFER (Gerardo), am scribing for, and in the presence of, Radha Silva MD.    Electronically signed by: Radha Silva (Gerardo), 11/3/2019    Radha SCHAFFER MD personally performed the services described in this documentation, as scribed by Radha Silva in my presence, and it is both accurate and complete    The note accurately reflects work and decisions made by me.  Radha Silva  11/3/2019  4:06 AM

## 2019-11-03 NOTE — ED NOTES
Pt discharged with instructions and scripts.  Pt verbalized understanding of discharge instructions.  Pt ambulated out of ED without difficulty.

## 2019-11-03 NOTE — ED NOTES
BP rechecked x3 and consistently 220's/120's in both arms. Dr. Silva aware and okay with pt being d/c at this time.

## 2019-11-04 ENCOUNTER — APPOINTMENT (OUTPATIENT)
Dept: RADIOLOGY | Facility: MEDICAL CENTER | Age: 54
DRG: 291 | End: 2019-11-04
Attending: EMERGENCY MEDICINE
Payer: MEDICARE

## 2019-11-04 ENCOUNTER — HOSPITAL ENCOUNTER (OUTPATIENT)
Dept: LAB | Facility: MEDICAL CENTER | Age: 54
End: 2019-11-04
Attending: INTERNAL MEDICINE
Payer: MEDICARE

## 2019-11-04 ENCOUNTER — HOSPITAL ENCOUNTER (INPATIENT)
Facility: MEDICAL CENTER | Age: 54
LOS: 2 days | DRG: 291 | End: 2019-11-07
Attending: EMERGENCY MEDICINE | Admitting: INTERNAL MEDICINE
Payer: MEDICARE

## 2019-11-04 DIAGNOSIS — R07.2 PRECORDIAL CHEST PAIN: ICD-10-CM

## 2019-11-04 DIAGNOSIS — I16.1 HYPERTENSIVE EMERGENCY: ICD-10-CM

## 2019-11-04 DIAGNOSIS — I50.9 ACUTE CONGESTIVE HEART FAILURE, UNSPECIFIED HEART FAILURE TYPE (HCC): ICD-10-CM

## 2019-11-04 DIAGNOSIS — I50.1 PULMONARY EDEMA CARDIAC CAUSE (HCC): ICD-10-CM

## 2019-11-04 LAB
ALBUMIN SERPL BCP-MCNC: 4.4 G/DL (ref 3.2–4.9)
ALBUMIN SERPL BCP-MCNC: 4.5 G/DL (ref 3.2–4.9)
ALBUMIN/GLOB SERPL: 1.5 G/DL
ALBUMIN/GLOB SERPL: 1.6 G/DL
ALP SERPL-CCNC: 84 U/L (ref 30–99)
ALP SERPL-CCNC: 92 U/L (ref 30–99)
ALT SERPL-CCNC: 29 U/L (ref 2–50)
ALT SERPL-CCNC: 29 U/L (ref 2–50)
ANION GAP SERPL CALC-SCNC: 11 MMOL/L (ref 0–11.9)
ANION GAP SERPL CALC-SCNC: 17 MMOL/L (ref 0–11.9)
APTT PPP: 25.5 SEC (ref 24.7–36)
AST SERPL-CCNC: 26 U/L (ref 12–45)
AST SERPL-CCNC: 29 U/L (ref 12–45)
BASOPHILS # BLD AUTO: 0.2 % (ref 0–1.8)
BASOPHILS # BLD: 0.03 K/UL (ref 0–0.12)
BILIRUB SERPL-MCNC: 0.3 MG/DL (ref 0.1–1.5)
BILIRUB SERPL-MCNC: 0.4 MG/DL (ref 0.1–1.5)
BUN SERPL-MCNC: 15 MG/DL (ref 8–22)
BUN SERPL-MCNC: 16 MG/DL (ref 8–22)
CALCIUM SERPL-MCNC: 9.4 MG/DL (ref 8.4–10.2)
CALCIUM SERPL-MCNC: 9.4 MG/DL (ref 8.5–10.5)
CHLORIDE SERPL-SCNC: 103 MMOL/L (ref 96–112)
CHLORIDE SERPL-SCNC: 105 MMOL/L (ref 96–112)
CO2 SERPL-SCNC: 17 MMOL/L (ref 20–33)
CO2 SERPL-SCNC: 22 MMOL/L (ref 20–33)
CREAT SERPL-MCNC: 0.84 MG/DL (ref 0.5–1.4)
CREAT SERPL-MCNC: 0.92 MG/DL (ref 0.5–1.4)
EKG IMPRESSION: NORMAL
EKG IMPRESSION: NORMAL
EOSINOPHIL # BLD AUTO: 0.02 K/UL (ref 0–0.51)
EOSINOPHIL NFR BLD: 0.2 % (ref 0–6.9)
ERYTHROCYTE [DISTWIDTH] IN BLOOD BY AUTOMATED COUNT: 45.1 FL (ref 35.9–50)
GLOBULIN SER CALC-MCNC: 2.8 G/DL (ref 1.9–3.5)
GLOBULIN SER CALC-MCNC: 3 G/DL (ref 1.9–3.5)
GLUCOSE SERPL-MCNC: 125 MG/DL (ref 65–99)
GLUCOSE SERPL-MCNC: 136 MG/DL (ref 65–99)
HCT VFR BLD AUTO: 44.2 % (ref 37–47)
HGB BLD-MCNC: 14.2 G/DL (ref 12–16)
IMM GRANULOCYTES # BLD AUTO: 0.05 K/UL (ref 0–0.11)
IMM GRANULOCYTES NFR BLD AUTO: 0.4 % (ref 0–0.9)
INR PPP: 0.88 (ref 0.87–1.13)
LIPASE SERPL-CCNC: 45 U/L (ref 7–58)
LYMPHOCYTES # BLD AUTO: 2.3 K/UL (ref 1–4.8)
LYMPHOCYTES NFR BLD: 18.2 % (ref 22–41)
MCH RBC QN AUTO: 28.1 PG (ref 27–33)
MCHC RBC AUTO-ENTMCNC: 32.1 G/DL (ref 33.6–35)
MCV RBC AUTO: 87.4 FL (ref 81.4–97.8)
MONOCYTES # BLD AUTO: 1.4 K/UL (ref 0–0.85)
MONOCYTES NFR BLD AUTO: 11.1 % (ref 0–13.4)
NEUTROPHILS # BLD AUTO: 8.82 K/UL (ref 2–7.15)
NEUTROPHILS NFR BLD: 69.9 % (ref 44–72)
NRBC # BLD AUTO: 0 K/UL
NRBC BLD-RTO: 0 /100 WBC
NT-PROBNP SERPL IA-MCNC: 1575 PG/ML (ref 0–125)
PHOSPHATE SERPL-MCNC: 2.8 MG/DL (ref 2.5–4.5)
PLATELET # BLD AUTO: 321 K/UL (ref 164–446)
PMV BLD AUTO: 10.2 FL (ref 9–12.9)
POTASSIUM SERPL-SCNC: 3.7 MMOL/L (ref 3.6–5.5)
POTASSIUM SERPL-SCNC: 4 MMOL/L (ref 3.6–5.5)
PROT SERPL-MCNC: 7.3 G/DL (ref 6–8.2)
PROT SERPL-MCNC: 7.4 G/DL (ref 6–8.2)
PROTHROMBIN TIME: 12 SEC (ref 12–14.6)
RBC # BLD AUTO: 5.06 M/UL (ref 4.2–5.4)
SODIUM SERPL-SCNC: 137 MMOL/L (ref 135–145)
SODIUM SERPL-SCNC: 138 MMOL/L (ref 135–145)
TROPONIN T SERPL-MCNC: 20 NG/L (ref 6–19)
TSH SERPL DL<=0.005 MIU/L-ACNC: 1.24 UIU/ML (ref 0.38–5.33)
WBC # BLD AUTO: 12.6 K/UL (ref 4.8–10.8)

## 2019-11-04 PROCEDURE — 96374 THER/PROPH/DIAG INJ IV PUSH: CPT

## 2019-11-04 PROCEDURE — 71045 X-RAY EXAM CHEST 1 VIEW: CPT

## 2019-11-04 PROCEDURE — 99285 EMERGENCY DEPT VISIT HI MDM: CPT

## 2019-11-04 PROCEDURE — 84100 ASSAY OF PHOSPHORUS: CPT

## 2019-11-04 PROCEDURE — 80053 COMPREHEN METABOLIC PANEL: CPT | Mod: 91

## 2019-11-04 PROCEDURE — 93005 ELECTROCARDIOGRAM TRACING: CPT | Performed by: EMERGENCY MEDICINE

## 2019-11-04 PROCEDURE — 85025 COMPLETE CBC W/AUTO DIFF WBC: CPT

## 2019-11-04 PROCEDURE — 93005 ELECTROCARDIOGRAM TRACING: CPT

## 2019-11-04 PROCEDURE — 80053 COMPREHEN METABOLIC PANEL: CPT

## 2019-11-04 PROCEDURE — 82340 ASSAY OF CALCIUM IN URINE: CPT

## 2019-11-04 PROCEDURE — 700111 HCHG RX REV CODE 636 W/ 250 OVERRIDE (IP): Performed by: EMERGENCY MEDICINE

## 2019-11-04 PROCEDURE — 85610 PROTHROMBIN TIME: CPT

## 2019-11-04 PROCEDURE — 83690 ASSAY OF LIPASE: CPT

## 2019-11-04 PROCEDURE — 84484 ASSAY OF TROPONIN QUANT: CPT

## 2019-11-04 PROCEDURE — 94760 N-INVAS EAR/PLS OXIMETRY 1: CPT

## 2019-11-04 PROCEDURE — 83945 ASSAY OF OXALATE: CPT

## 2019-11-04 PROCEDURE — 84443 ASSAY THYROID STIM HORMONE: CPT

## 2019-11-04 PROCEDURE — 82570 ASSAY OF URINE CREATININE: CPT

## 2019-11-04 PROCEDURE — 36415 COLL VENOUS BLD VENIPUNCTURE: CPT

## 2019-11-04 PROCEDURE — 83880 ASSAY OF NATRIURETIC PEPTIDE: CPT

## 2019-11-04 PROCEDURE — 96375 TX/PRO/DX INJ NEW DRUG ADDON: CPT

## 2019-11-04 PROCEDURE — 84105 ASSAY OF URINE PHOSPHORUS: CPT

## 2019-11-04 PROCEDURE — 85730 THROMBOPLASTIN TIME PARTIAL: CPT

## 2019-11-04 RX ORDER — ONDANSETRON 2 MG/ML
4 INJECTION INTRAMUSCULAR; INTRAVENOUS ONCE
Status: COMPLETED | OUTPATIENT
Start: 2019-11-04 | End: 2019-11-04

## 2019-11-04 RX ORDER — DIPHENHYDRAMINE HYDROCHLORIDE 50 MG/ML
50 INJECTION INTRAMUSCULAR; INTRAVENOUS ONCE
Status: COMPLETED | OUTPATIENT
Start: 2019-11-04 | End: 2019-11-04

## 2019-11-04 RX ORDER — PREDNISONE 20 MG/1
20 TABLET ORAL DAILY
COMMUNITY
End: 2019-12-10

## 2019-11-04 RX ORDER — METHYLPREDNISOLONE SODIUM SUCCINATE 125 MG/2ML
125 INJECTION, POWDER, LYOPHILIZED, FOR SOLUTION INTRAMUSCULAR; INTRAVENOUS ONCE
Status: COMPLETED | OUTPATIENT
Start: 2019-11-04 | End: 2019-11-04

## 2019-11-04 RX ORDER — LABETALOL HYDROCHLORIDE 5 MG/ML
20 INJECTION, SOLUTION INTRAVENOUS ONCE
Status: COMPLETED | OUTPATIENT
Start: 2019-11-05 | End: 2019-11-05

## 2019-11-04 RX ADMIN — FENTANYL CITRATE 100 MCG: 0.05 INJECTION, SOLUTION INTRAMUSCULAR; INTRAVENOUS at 23:45

## 2019-11-04 RX ADMIN — METHYLPREDNISOLONE SODIUM SUCCINATE 125 MG: 125 INJECTION, POWDER, FOR SOLUTION INTRAMUSCULAR; INTRAVENOUS at 23:44

## 2019-11-04 RX ADMIN — DIPHENHYDRAMINE HYDROCHLORIDE 50 MG: 50 INJECTION INTRAMUSCULAR; INTRAVENOUS at 23:44

## 2019-11-04 RX ADMIN — ONDANSETRON 4 MG: 2 INJECTION INTRAMUSCULAR; INTRAVENOUS at 23:44

## 2019-11-05 ENCOUNTER — APPOINTMENT (OUTPATIENT)
Dept: CARDIOLOGY | Facility: MEDICAL CENTER | Age: 54
DRG: 291 | End: 2019-11-05
Attending: INTERNAL MEDICINE
Payer: MEDICARE

## 2019-11-05 ENCOUNTER — TELEPHONE (OUTPATIENT)
Dept: CARDIOLOGY | Facility: MEDICAL CENTER | Age: 54
End: 2019-11-05

## 2019-11-05 PROBLEM — D72.829 LEUKOCYTOSIS: Status: ACTIVE | Noted: 2019-11-05

## 2019-11-05 PROBLEM — I16.1 HYPERTENSIVE EMERGENCY: Status: ACTIVE | Noted: 2018-09-26

## 2019-11-05 PROBLEM — E87.29 HIGH ANION GAP METABOLIC ACIDOSIS: Status: ACTIVE | Noted: 2019-11-05

## 2019-11-05 PROBLEM — J81.1 PULMONARY EDEMA: Status: ACTIVE | Noted: 2019-11-05

## 2019-11-05 PROBLEM — R07.9 CHEST PAIN: Status: ACTIVE | Noted: 2019-11-05

## 2019-11-05 PROBLEM — E03.9 HYPOTHYROIDISM: Status: ACTIVE | Noted: 2019-11-05

## 2019-11-05 LAB
ANION GAP SERPL CALC-SCNC: 16 MMOL/L (ref 0–11.9)
BUN SERPL-MCNC: 21 MG/DL (ref 8–22)
CALCIUM SERPL-MCNC: 9 MG/DL (ref 8.4–10.2)
CHLORIDE SERPL-SCNC: 97 MMOL/L (ref 96–112)
CO2 SERPL-SCNC: 22 MMOL/L (ref 20–33)
CREAT SERPL-MCNC: 1.1 MG/DL (ref 0.5–1.4)
EKG IMPRESSION: NORMAL
GLUCOSE SERPL-MCNC: 108 MG/DL (ref 65–99)
LACTATE BLD-SCNC: 2.5 MMOL/L (ref 0.5–2)
LACTATE BLD-SCNC: 3 MMOL/L (ref 0.5–2)
LACTATE BLD-SCNC: 3.4 MMOL/L (ref 0.5–2)
LACTATE BLD-SCNC: 4.3 MMOL/L (ref 0.5–2)
LACTATE BLD-SCNC: 5.4 MMOL/L (ref 0.5–2)
LV EJECT FRACT  99904: 20
LV EJECT FRACT MOD 2C 99903: 9.61
LV EJECT FRACT MOD 4C 99902: 3.79
LV EJECT FRACT MOD BP 99901: 5.29
POTASSIUM SERPL-SCNC: 4.2 MMOL/L (ref 3.6–5.5)
PROCALCITONIN SERPL-MCNC: 0.09 NG/ML
SODIUM SERPL-SCNC: 135 MMOL/L (ref 135–145)
TROPONIN T SERPL-MCNC: 73 NG/L (ref 6–19)
TROPONIN T SERPL-MCNC: 93 NG/L (ref 6–19)

## 2019-11-05 PROCEDURE — 94760 N-INVAS EAR/PLS OXIMETRY 1: CPT

## 2019-11-05 PROCEDURE — 99223 1ST HOSP IP/OBS HIGH 75: CPT | Mod: AI | Performed by: INTERNAL MEDICINE

## 2019-11-05 PROCEDURE — 83605 ASSAY OF LACTIC ACID: CPT | Mod: 91

## 2019-11-05 PROCEDURE — 71275 CT ANGIOGRAPHY CHEST: CPT

## 2019-11-05 PROCEDURE — 700117 HCHG RX CONTRAST REV CODE 255: Performed by: INTERNAL MEDICINE

## 2019-11-05 PROCEDURE — 96375 TX/PRO/DX INJ NEW DRUG ADDON: CPT

## 2019-11-05 PROCEDURE — 700101 HCHG RX REV CODE 250: Performed by: EMERGENCY MEDICINE

## 2019-11-05 PROCEDURE — 84484 ASSAY OF TROPONIN QUANT: CPT

## 2019-11-05 PROCEDURE — 700102 HCHG RX REV CODE 250 W/ 637 OVERRIDE(OP): Performed by: INTERNAL MEDICINE

## 2019-11-05 PROCEDURE — A9270 NON-COVERED ITEM OR SERVICE: HCPCS | Performed by: INTERNAL MEDICINE

## 2019-11-05 PROCEDURE — 700111 HCHG RX REV CODE 636 W/ 250 OVERRIDE (IP): Performed by: EMERGENCY MEDICINE

## 2019-11-05 PROCEDURE — 700102 HCHG RX REV CODE 250 W/ 637 OVERRIDE(OP): Performed by: HOSPITALIST

## 2019-11-05 PROCEDURE — 99222 1ST HOSP IP/OBS MODERATE 55: CPT | Performed by: INTERNAL MEDICINE

## 2019-11-05 PROCEDURE — 700102 HCHG RX REV CODE 250 W/ 637 OVERRIDE(OP): Performed by: EMERGENCY MEDICINE

## 2019-11-05 PROCEDURE — A9270 NON-COVERED ITEM OR SERVICE: HCPCS | Performed by: HOSPITALIST

## 2019-11-05 PROCEDURE — 80048 BASIC METABOLIC PNL TOTAL CA: CPT

## 2019-11-05 PROCEDURE — 93010 ELECTROCARDIOGRAM REPORT: CPT | Performed by: INTERNAL MEDICINE

## 2019-11-05 PROCEDURE — 770020 HCHG ROOM/CARE - TELE (206)

## 2019-11-05 PROCEDURE — A9270 NON-COVERED ITEM OR SERVICE: HCPCS | Performed by: EMERGENCY MEDICINE

## 2019-11-05 PROCEDURE — 84145 PROCALCITONIN (PCT): CPT

## 2019-11-05 PROCEDURE — 93005 ELECTROCARDIOGRAM TRACING: CPT | Performed by: HOSPITALIST

## 2019-11-05 PROCEDURE — 700117 HCHG RX CONTRAST REV CODE 255: Performed by: EMERGENCY MEDICINE

## 2019-11-05 PROCEDURE — 93306 TTE W/DOPPLER COMPLETE: CPT

## 2019-11-05 PROCEDURE — 96376 TX/PRO/DX INJ SAME DRUG ADON: CPT

## 2019-11-05 PROCEDURE — 93306 TTE W/DOPPLER COMPLETE: CPT | Mod: 26 | Performed by: INTERNAL MEDICINE

## 2019-11-05 PROCEDURE — 700111 HCHG RX REV CODE 636 W/ 250 OVERRIDE (IP): Performed by: INTERNAL MEDICINE

## 2019-11-05 RX ORDER — ONDANSETRON 2 MG/ML
4 INJECTION INTRAMUSCULAR; INTRAVENOUS EVERY 4 HOURS PRN
Status: DISCONTINUED | OUTPATIENT
Start: 2019-11-05 | End: 2019-11-07 | Stop reason: HOSPADM

## 2019-11-05 RX ORDER — HYDROXYZINE HYDROCHLORIDE 25 MG/1
25 TABLET, FILM COATED ORAL
COMMUNITY
End: 2020-01-21

## 2019-11-05 RX ORDER — CEVIMELINE HYDROCHLORIDE 30 MG/1
30 CAPSULE ORAL 3 TIMES DAILY
Status: DISCONTINUED | OUTPATIENT
Start: 2019-11-05 | End: 2019-11-07 | Stop reason: HOSPADM

## 2019-11-05 RX ORDER — PROMETHAZINE HYDROCHLORIDE 25 MG/1
12.5-25 SUPPOSITORY RECTAL EVERY 4 HOURS PRN
Status: DISCONTINUED | OUTPATIENT
Start: 2019-11-05 | End: 2019-11-07 | Stop reason: HOSPADM

## 2019-11-05 RX ORDER — BISACODYL 10 MG
10 SUPPOSITORY, RECTAL RECTAL
Status: DISCONTINUED | OUTPATIENT
Start: 2019-11-05 | End: 2019-11-07 | Stop reason: HOSPADM

## 2019-11-05 RX ORDER — POLYETHYLENE GLYCOL 3350 17 G/17G
1 POWDER, FOR SOLUTION ORAL
Status: DISCONTINUED | OUTPATIENT
Start: 2019-11-05 | End: 2019-11-07 | Stop reason: HOSPADM

## 2019-11-05 RX ORDER — CLONIDINE HYDROCHLORIDE 0.1 MG/1
0.1 TABLET ORAL EVERY 6 HOURS PRN
Status: DISCONTINUED | OUTPATIENT
Start: 2019-11-05 | End: 2019-11-07 | Stop reason: HOSPADM

## 2019-11-05 RX ORDER — ESTRADIOL 1 MG/1
0.5 TABLET ORAL DAILY
Status: DISCONTINUED | OUTPATIENT
Start: 2019-11-05 | End: 2019-11-07 | Stop reason: HOSPADM

## 2019-11-05 RX ORDER — ACETAMINOPHEN 325 MG/1
650 TABLET ORAL EVERY 6 HOURS PRN
Status: DISCONTINUED | OUTPATIENT
Start: 2019-11-05 | End: 2019-11-07 | Stop reason: HOSPADM

## 2019-11-05 RX ORDER — POTASSIUM CHLORIDE 20 MEQ/1
40 TABLET, EXTENDED RELEASE ORAL 2 TIMES DAILY
Status: DISCONTINUED | OUTPATIENT
Start: 2019-11-05 | End: 2019-11-07 | Stop reason: HOSPADM

## 2019-11-05 RX ORDER — DULOXETIN HYDROCHLORIDE 30 MG/1
60 CAPSULE, DELAYED RELEASE ORAL DAILY
Status: DISCONTINUED | OUTPATIENT
Start: 2019-11-05 | End: 2019-11-07 | Stop reason: HOSPADM

## 2019-11-05 RX ORDER — RANITIDINE 150 MG/1
300 TABLET ORAL 2 TIMES DAILY
Status: DISCONTINUED | OUTPATIENT
Start: 2019-11-05 | End: 2019-11-05

## 2019-11-05 RX ORDER — PROCHLORPERAZINE EDISYLATE 5 MG/ML
5-10 INJECTION INTRAMUSCULAR; INTRAVENOUS EVERY 4 HOURS PRN
Status: DISCONTINUED | OUTPATIENT
Start: 2019-11-05 | End: 2019-11-07 | Stop reason: HOSPADM

## 2019-11-05 RX ORDER — HYDROXYZINE HYDROCHLORIDE 25 MG/ML
25 INJECTION, SOLUTION INTRAMUSCULAR EVERY 6 HOURS PRN
Status: DISCONTINUED | OUTPATIENT
Start: 2019-11-05 | End: 2019-11-07 | Stop reason: HOSPADM

## 2019-11-05 RX ORDER — PANTOPRAZOLE SODIUM 40 MG/1
40 TABLET, DELAYED RELEASE ORAL EVERY EVENING
Status: ON HOLD | COMMUNITY
End: 2021-04-28

## 2019-11-05 RX ORDER — PROMETHAZINE HYDROCHLORIDE 25 MG/1
12.5-25 TABLET ORAL EVERY 4 HOURS PRN
Status: DISCONTINUED | OUTPATIENT
Start: 2019-11-05 | End: 2019-11-07 | Stop reason: HOSPADM

## 2019-11-05 RX ORDER — CARVEDILOL 6.25 MG/1
6.25 TABLET ORAL 2 TIMES DAILY WITH MEALS
Status: DISCONTINUED | OUTPATIENT
Start: 2019-11-05 | End: 2019-11-05

## 2019-11-05 RX ORDER — CARVEDILOL 6.25 MG/1
6.25 TABLET ORAL 2 TIMES DAILY WITH MEALS
Status: DISCONTINUED | OUTPATIENT
Start: 2019-11-05 | End: 2019-11-06

## 2019-11-05 RX ORDER — FUROSEMIDE 10 MG/ML
40 INJECTION INTRAMUSCULAR; INTRAVENOUS ONCE
Status: COMPLETED | OUTPATIENT
Start: 2019-11-05 | End: 2019-11-05

## 2019-11-05 RX ORDER — PANTOPRAZOLE SODIUM 40 MG/1
40 TABLET, DELAYED RELEASE ORAL DAILY
Status: DISCONTINUED | OUTPATIENT
Start: 2019-11-05 | End: 2019-11-05

## 2019-11-05 RX ORDER — LOSARTAN POTASSIUM 25 MG/1
25 TABLET ORAL EVERY EVENING
Status: ON HOLD | COMMUNITY
End: 2019-11-07

## 2019-11-05 RX ORDER — HYDROMORPHONE HYDROCHLORIDE 1 MG/ML
1 INJECTION, SOLUTION INTRAMUSCULAR; INTRAVENOUS; SUBCUTANEOUS
Status: DISCONTINUED | OUTPATIENT
Start: 2019-11-05 | End: 2019-11-07 | Stop reason: HOSPADM

## 2019-11-05 RX ORDER — MONTELUKAST SODIUM 10 MG/1
10 TABLET ORAL EVERY EVENING
COMMUNITY
End: 2021-10-25 | Stop reason: SDUPTHER

## 2019-11-05 RX ORDER — LORAZEPAM 2 MG/ML
0.5 INJECTION INTRAMUSCULAR EVERY 4 HOURS PRN
Status: DISCONTINUED | OUTPATIENT
Start: 2019-11-05 | End: 2019-11-07 | Stop reason: HOSPADM

## 2019-11-05 RX ORDER — ACETAMINOPHEN 500 MG
1000 TABLET ORAL EVERY 6 HOURS PRN
COMMUNITY
End: 2019-12-10

## 2019-11-05 RX ORDER — AMOXICILLIN 250 MG
2 CAPSULE ORAL 2 TIMES DAILY
Status: DISCONTINUED | OUTPATIENT
Start: 2019-11-05 | End: 2019-11-07 | Stop reason: HOSPADM

## 2019-11-05 RX ORDER — FROVATRIPTAN SUCCINATE 2.5 MG/1
2.5 TABLET, FILM COATED ORAL PRN
Status: ON HOLD | COMMUNITY
End: 2021-07-09

## 2019-11-05 RX ORDER — GABAPENTIN 400 MG/1
400 CAPSULE ORAL 3 TIMES DAILY
Status: DISCONTINUED | OUTPATIENT
Start: 2019-11-05 | End: 2019-11-07 | Stop reason: HOSPADM

## 2019-11-05 RX ORDER — LOSARTAN POTASSIUM 25 MG/1
25 TABLET ORAL
Status: DISCONTINUED | OUTPATIENT
Start: 2019-11-05 | End: 2019-11-07 | Stop reason: HOSPADM

## 2019-11-05 RX ORDER — DULOXETIN HYDROCHLORIDE 60 MG/1
60 CAPSULE, DELAYED RELEASE ORAL EVERY EVENING
COMMUNITY
End: 2021-03-16

## 2019-11-05 RX ORDER — OMEPRAZOLE 20 MG/1
20 CAPSULE, DELAYED RELEASE ORAL DAILY
Status: DISCONTINUED | OUTPATIENT
Start: 2019-11-05 | End: 2019-11-07 | Stop reason: HOSPADM

## 2019-11-05 RX ORDER — TIZANIDINE 4 MG/1
4 TABLET ORAL EVERY 6 HOURS PRN
Status: DISCONTINUED | OUTPATIENT
Start: 2019-11-05 | End: 2019-11-07 | Stop reason: HOSPADM

## 2019-11-05 RX ORDER — LEVOTHYROXINE SODIUM 0.05 MG/1
75 TABLET ORAL
Status: DISCONTINUED | OUTPATIENT
Start: 2019-11-05 | End: 2019-11-07 | Stop reason: HOSPADM

## 2019-11-05 RX ORDER — CARVEDILOL 6.25 MG/1
12.5 TABLET ORAL 2 TIMES DAILY WITH MEALS
Status: DISCONTINUED | OUTPATIENT
Start: 2019-11-05 | End: 2019-11-05 | Stop reason: DRUGHIGH

## 2019-11-05 RX ORDER — LIOTHYRONINE SODIUM 5 UG/1
25 TABLET ORAL DAILY
Status: DISCONTINUED | OUTPATIENT
Start: 2019-11-05 | End: 2019-11-07 | Stop reason: HOSPADM

## 2019-11-05 RX ORDER — ENALAPRILAT 1.25 MG/ML
1.25 INJECTION INTRAVENOUS EVERY 6 HOURS PRN
Status: DISCONTINUED | OUTPATIENT
Start: 2019-11-05 | End: 2019-11-07 | Stop reason: HOSPADM

## 2019-11-05 RX ORDER — FUROSEMIDE 10 MG/ML
40 INJECTION INTRAMUSCULAR; INTRAVENOUS
Status: DISCONTINUED | OUTPATIENT
Start: 2019-11-05 | End: 2019-11-07 | Stop reason: HOSPADM

## 2019-11-05 RX ORDER — SPIRONOLACTONE 25 MG/1
25 TABLET ORAL
Status: DISCONTINUED | OUTPATIENT
Start: 2019-11-05 | End: 2019-11-07 | Stop reason: HOSPADM

## 2019-11-05 RX ORDER — TIZANIDINE 4 MG/1
TABLET ORAL EVERY 6 HOURS PRN
COMMUNITY
End: 2021-03-16

## 2019-11-05 RX ORDER — DIPHENHYDRAMINE HCL 25 MG
25 TABLET ORAL ONCE
Status: DISPENSED | OUTPATIENT
Start: 2019-11-05 | End: 2019-11-06

## 2019-11-05 RX ORDER — ONDANSETRON 4 MG/1
4 TABLET, ORALLY DISINTEGRATING ORAL EVERY 4 HOURS PRN
Status: DISCONTINUED | OUTPATIENT
Start: 2019-11-05 | End: 2019-11-07 | Stop reason: HOSPADM

## 2019-11-05 RX ORDER — LISINOPRIL 5 MG/1
10 TABLET ORAL
Status: DISCONTINUED | OUTPATIENT
Start: 2019-11-05 | End: 2019-11-05

## 2019-11-05 RX ORDER — PREDNISONE 10 MG/1
20 TABLET ORAL DAILY
Status: DISCONTINUED | OUTPATIENT
Start: 2019-11-05 | End: 2019-11-07 | Stop reason: HOSPADM

## 2019-11-05 RX ORDER — HYDROXYZINE HYDROCHLORIDE 25 MG/ML
INJECTION, SOLUTION INTRAMUSCULAR
Status: ACTIVE
Start: 2019-11-05 | End: 2019-11-05

## 2019-11-05 RX ADMIN — HYDROMORPHONE HYDROCHLORIDE 1 MG: 1 INJECTION, SOLUTION INTRAMUSCULAR; INTRAVENOUS; SUBCUTANEOUS at 20:19

## 2019-11-05 RX ADMIN — MAGNESIUM OXIDE TAB 400 MG (241.3 MG ELEMENTAL MG) 400 MG: 400 (241.3 MG) TAB at 17:00

## 2019-11-05 RX ADMIN — GABAPENTIN 400 MG: 400 CAPSULE ORAL at 05:26

## 2019-11-05 RX ADMIN — FUROSEMIDE 40 MG: 10 INJECTION, SOLUTION INTRAMUSCULAR; INTRAVENOUS at 01:01

## 2019-11-05 RX ADMIN — POTASSIUM CHLORIDE 40 MEQ: 20 TABLET, EXTENDED RELEASE ORAL at 16:59

## 2019-11-05 RX ADMIN — HYDROXYZINE HYDROCHLORIDE 25 MG: 25 INJECTION, SOLUTION INTRAMUSCULAR at 09:35

## 2019-11-05 RX ADMIN — FUROSEMIDE 40 MG: 10 INJECTION, SOLUTION INTRAMUSCULAR; INTRAVENOUS at 20:19

## 2019-11-05 RX ADMIN — HYDROMORPHONE HYDROCHLORIDE 1 MG: 1 INJECTION, SOLUTION INTRAMUSCULAR; INTRAVENOUS; SUBCUTANEOUS at 03:51

## 2019-11-05 RX ADMIN — CARVEDILOL 6.25 MG: 6.25 TABLET, FILM COATED ORAL at 18:03

## 2019-11-05 RX ADMIN — LABETALOL HYDROCHLORIDE 20 MG: 5 INJECTION, SOLUTION INTRAVENOUS at 00:24

## 2019-11-05 RX ADMIN — CEVIMELINE HYDROCHLORIDE 30 MG: 30 CAPSULE ORAL at 12:00

## 2019-11-05 RX ADMIN — HYDROXYZINE HYDROCHLORIDE 25 MG: 25 INJECTION, SOLUTION INTRAMUSCULAR at 03:01

## 2019-11-05 RX ADMIN — OMEPRAZOLE 20 MG: 20 CAPSULE, DELAYED RELEASE ORAL at 05:26

## 2019-11-05 RX ADMIN — LIOTHYRONINE SODIUM 25 MCG: 5 TABLET ORAL at 09:22

## 2019-11-05 RX ADMIN — FUROSEMIDE 40 MG: 10 INJECTION, SOLUTION INTRAMUSCULAR; INTRAVENOUS at 05:25

## 2019-11-05 RX ADMIN — FENTANYL CITRATE 100 MCG: 0.05 INJECTION, SOLUTION INTRAMUSCULAR; INTRAVENOUS at 01:08

## 2019-11-05 RX ADMIN — HYDROMORPHONE HYDROCHLORIDE 1 MG: 1 INJECTION, SOLUTION INTRAMUSCULAR; INTRAVENOUS; SUBCUTANEOUS at 09:23

## 2019-11-05 RX ADMIN — CEVIMELINE HYDROCHLORIDE 30 MG: 30 CAPSULE ORAL at 18:00

## 2019-11-05 RX ADMIN — ESTRADIOL 0.5 MG: 1 TABLET ORAL at 05:27

## 2019-11-05 RX ADMIN — LOSARTAN POTASSIUM 25 MG: 25 TABLET ORAL at 05:28

## 2019-11-05 RX ADMIN — HYDROMORPHONE HYDROCHLORIDE 1 MG: 1 INJECTION, SOLUTION INTRAMUSCULAR; INTRAVENOUS; SUBCUTANEOUS at 14:49

## 2019-11-05 RX ADMIN — PREDNISONE 20 MG: 10 TABLET ORAL at 05:29

## 2019-11-05 RX ADMIN — IOHEXOL 100 ML: 350 INJECTION, SOLUTION INTRAVENOUS at 00:22

## 2019-11-05 RX ADMIN — GABAPENTIN 400 MG: 400 CAPSULE ORAL at 20:18

## 2019-11-05 RX ADMIN — DULOXETINE HYDROCHLORIDE 60 MG: 30 CAPSULE, DELAYED RELEASE ORAL at 05:27

## 2019-11-05 RX ADMIN — HUMAN ALBUMIN MICROSPHERES AND PERFLUTREN 3 ML: 10; .22 INJECTION, SOLUTION INTRAVENOUS at 09:30

## 2019-11-05 RX ADMIN — NITROGLYCERIN 0.5 INCH: 20 OINTMENT TOPICAL at 05:14

## 2019-11-05 RX ADMIN — ENOXAPARIN SODIUM 40 MG: 100 INJECTION SUBCUTANEOUS at 05:25

## 2019-11-05 RX ADMIN — GABAPENTIN 400 MG: 400 CAPSULE ORAL at 14:49

## 2019-11-05 RX ADMIN — CARVEDILOL 6.25 MG: 6.25 TABLET, FILM COATED ORAL at 09:22

## 2019-11-05 RX ADMIN — NITROGLYCERIN 1 INCH: 20 OINTMENT TOPICAL at 01:01

## 2019-11-05 RX ADMIN — HYDROXYZINE HYDROCHLORIDE 25 MG: 25 INJECTION, SOLUTION INTRAMUSCULAR at 20:18

## 2019-11-05 RX ADMIN — SPIRONOLACTONE 25 MG: 25 TABLET ORAL at 18:02

## 2019-11-05 RX ADMIN — LEVOTHYROXINE SODIUM 75 MCG: 50 TABLET ORAL at 05:26

## 2019-11-05 RX ADMIN — POTASSIUM CHLORIDE 40 MEQ: 20 TABLET, EXTENDED RELEASE ORAL at 05:25

## 2019-11-05 SDOH — HEALTH STABILITY: MENTAL HEALTH: HOW OFTEN DO YOU HAVE A DRINK CONTAINING ALCOHOL?: 2-4 TIMES A MONTH

## 2019-11-05 SDOH — ECONOMIC STABILITY: TRANSPORTATION INSECURITY
IN THE PAST 12 MONTHS, HAS LACK OF TRANSPORTATION KEPT YOU FROM MEETINGS, WORK, OR FROM GETTING THINGS NEEDED FOR DAILY LIVING?: NO

## 2019-11-05 SDOH — ECONOMIC STABILITY: FOOD INSECURITY: WITHIN THE PAST 12 MONTHS, THE FOOD YOU BOUGHT JUST DIDN'T LAST AND YOU DIDN'T HAVE MONEY TO GET MORE.: NEVER TRUE

## 2019-11-05 SDOH — HEALTH STABILITY: MENTAL HEALTH: HOW OFTEN DO YOU HAVE 6 OR MORE DRINKS ON ONE OCCASION?: NEVER

## 2019-11-05 SDOH — ECONOMIC STABILITY: TRANSPORTATION INSECURITY
IN THE PAST 12 MONTHS, HAS THE LACK OF TRANSPORTATION KEPT YOU FROM MEDICAL APPOINTMENTS OR FROM GETTING MEDICATIONS?: NO

## 2019-11-05 SDOH — HEALTH STABILITY: MENTAL HEALTH: HOW MANY STANDARD DRINKS CONTAINING ALCOHOL DO YOU HAVE ON A TYPICAL DAY?: 1 OR 2

## 2019-11-05 SDOH — ECONOMIC STABILITY: FOOD INSECURITY: WITHIN THE PAST 12 MONTHS, YOU WORRIED THAT YOUR FOOD WOULD RUN OUT BEFORE YOU GOT MONEY TO BUY MORE.: NEVER TRUE

## 2019-11-05 ASSESSMENT — ENCOUNTER SYMPTOMS
VOMITING: 1
CHILLS: 0
CONSTIPATION: 0
MYALGIAS: 0
FEVER: 0
WEAKNESS: 0
NAUSEA: 1
COUGH: 1
TINGLING: 0
SPUTUM PRODUCTION: 0
FALLS: 0
LOSS OF CONSCIOUSNESS: 0
DIZZINESS: 0
ABDOMINAL PAIN: 0
STRIDOR: 0
HEADACHES: 0
DEPRESSION: 0
PALPITATIONS: 0
DIARRHEA: 0
SHORTNESS OF BREATH: 1

## 2019-11-05 ASSESSMENT — COGNITIVE AND FUNCTIONAL STATUS - GENERAL
SUGGESTED CMS G CODE MODIFIER MOBILITY: CH
DAILY ACTIVITIY SCORE: 24
SUGGESTED CMS G CODE MODIFIER DAILY ACTIVITY: CH
MOBILITY SCORE: 24

## 2019-11-05 ASSESSMENT — LIFESTYLE VARIABLES
ALCOHOL_USE: YES
DOES PATIENT WANT TO STOP DRINKING: NO
ON A TYPICAL DAY WHEN YOU DRINK ALCOHOL HOW MANY DRINKS DO YOU HAVE: 1
HAVE YOU EVER FELT YOU SHOULD CUT DOWN ON YOUR DRINKING: NO
AVERAGE NUMBER OF DAYS PER WEEK YOU HAVE A DRINK CONTAINING ALCOHOL: 2
HOW MANY TIMES IN THE PAST YEAR HAVE YOU HAD 5 OR MORE DRINKS IN A DAY: 0
EVER_SMOKED: NEVER
EVER_SMOKED: NEVER
TOTAL SCORE: 0
TOTAL SCORE: 0
EVER FELT BAD OR GUILTY ABOUT YOUR DRINKING: NO
EVER HAD A DRINK FIRST THING IN THE MORNING TO STEADY YOUR NERVES TO GET RID OF A HANGOVER: NO
HAVE PEOPLE ANNOYED YOU BY CRITICIZING YOUR DRINKING: NO
CONSUMPTION TOTAL: NEGATIVE
TOTAL SCORE: 0

## 2019-11-05 ASSESSMENT — COPD QUESTIONNAIRES
DURING THE PAST 4 WEEKS HOW MUCH DID YOU FEEL SHORT OF BREATH: NONE/LITTLE OF THE TIME
COPD SCREENING SCORE: 1
DURING THE PAST 4 WEEKS HOW MUCH DID YOU FEEL SHORT OF BREATH: NONE/LITTLE OF THE TIME
COPD SCREENING SCORE: 1
IN THE PAST 12 MONTHS DO YOU DO LESS THAN YOU USED TO BECAUSE OF YOUR BREATHING PROBLEMS: DISAGREE/UNSURE
DO YOU EVER COUGH UP ANY MUCUS OR PHLEGM?: NO/ONLY WITH OCCASIONAL COLDS OR INFECTIONS
HAVE YOU SMOKED AT LEAST 100 CIGARETTES IN YOUR ENTIRE LIFE: NO/DON'T KNOW
HAVE YOU SMOKED AT LEAST 100 CIGARETTES IN YOUR ENTIRE LIFE: NO/DON'T KNOW
DO YOU EVER COUGH UP ANY MUCUS OR PHLEGM?: NO/ONLY WITH OCCASIONAL COLDS OR INFECTIONS

## 2019-11-05 NOTE — ASSESSMENT & PLAN NOTE
-Continue home Cytomel at 25 mcg daily, continue home Synthroid at 75 mcg daily  -On admission TSH was normal at 1.24

## 2019-11-05 NOTE — PROGRESS NOTES
Rapid response was called around 9 AM this morning, she was complaining of chest pain it is a 10 in severity.  At the time an EKG was performed which showed nonspecific ST elevations although abnormal EKG.  Prior troponins were mildly elevated, recommended repeat troponins which are currently lower.  An echocardiogram was performed which showed a depressed LVEF function of 20%  Cardiology has been consulted and awaiting for recommendations, although cardiology did say that there is no emergent reason for cardiac catheterization, her depressed LVEF is probably due to uncontrolled hypertension.  Official consult pending  Continue to monitor clinically  Denies fever chills otherwise.

## 2019-11-05 NOTE — PROGRESS NOTES
Late entry:    Rapid response called for chest pain. Dr. Garcia at bedside on team arrival. No EKG at that time as a recent EKG was completed prior to rapid response. EKG had <1mm elevation in lead III only, Dr. Garcia reviewed. Dr. Garcia ordered troponin now, declines CXR as pt had CTA of chest completed prior. Echocardiogram was completed in AM prior to rapid response. No new orders to transfer.

## 2019-11-05 NOTE — ED TRIAGE NOTES
Pt comes in c/o CP that started about 30 minutes ago  Strong Hx of cardiac issues,new Dx of AAA and GI surgery  Pt stated she vomited in BR just a few minutes ago

## 2019-11-05 NOTE — PROGRESS NOTES
Telemetry Shift Summary     Rhythm SR/ST  HR Range   Ectopy PVC  Measurements 0.2/0.12/0.4           Normal Values  Rhythm SR  HR Range    Measurements 0.12-0.20 / 0.06-0.10  / 0.30-0.52

## 2019-11-05 NOTE — ASSESSMENT & PLAN NOTE
-Patient was noted to have significant hypoxia, oxygen saturation was mid 80s  -She is now requiring 4 L of oxygen  -Due to pulmonary edema  -Start IV Lasix  -Start respiratory care per protocol

## 2019-11-05 NOTE — CONSULTS
Cardiology Consult Note:    Alejandra To  Date & Time note created:    11/5/2019   3:37 PM     Referring MD:  Dr. Raman    Patient ID:   Name:             Donna Isaac   YOB: 1965  Age:                 54 y.o.  female   MRN:               [id number]                                                             Chief Complaint / Reason for consult:  Chest pain, elevated troponin and LVEF of 20%    History of Present Illness:    This is a 54 years old women with prior history of stressed induced cardiomyopathy diagnosed in 2015 in [place] with angiogram, hypertension, obesity, presented to the hospital with chest pain.  Onset was yesterday at 9:00.  Lasting for several hours.  Pressure-like sensation.  Blood pressure was elevated in the ER with systolic in the 190s.  Troponin T peaked at 93 and now back down to 73.  NT proBNP is 1575.    I have personally interpreted her EKG today with patient, there is no evidence of acute coronary syndrome, no evidence of prior infarct, normal AZ and QT interval, no significant conduction disease.    She does have a transthoracic echocardiogram which showed LV function of 20% with apical akinesis.  I personally interpreted the images.  This is a new reduction compared to her prior transthoracic echocardiogram in April 2019 which showed normal transthoracic echocardiogram.    Overall there is no significant valvular disease.    At this time, patient is feeling better.  No chest pain currently.    Review of Systems:      Constitutional: Denies fevers, Denies weight changes  Eyes: Denies changes in vision, no eye pain  Ears/Nose/Throat/Mouth: Denies nasal congestion or sore throat   Cardiovascular: no chest pain, no palpitations   Respiratory: no shortness of breath , Denies cough  Gastrointestinal/Hepatic: Denies abdominal pain, nausea, vomiting, diarrhea, constipation or GI bleeding   Genitourinary: Denies dysuria or frequency  Musculoskeletal/Rheum:  Denies  joint pain and swelling   Skin: Denies rash  Neurological: Denies headache, confusion, memory loss or focal weakness/parasthesias  Psychiatric: denies mood disorder   Endocrine: Domonique thyroid problems  Heme/Oncology/Lymph Nodes: Denies enlarged lymph nodes, denies brusing or known bleeding disorder  All other systems were reviewed and are negative (AMA/CMS criteria)                Past Medical History:   Past Medical History:   Diagnosis Date   • AAA (abdominal aortic aneurysm) ([facility])    • Asthma    • Chronic pain    • Congestive heart failure ([facility])     Cardiologist, Dr. Carlson   • Fibromyalgia    • GERD (gastroesophageal reflux disease)    • Hypertension    • Migraine    • Osteoporosis    • Renal disorder     CKD   • Urticaria      Active Hospital Problems    Diagnosis   • Chest pain [R07.9]     Priority: Medium   • Acute respiratory failure with hypoxia ([facility]) [J96.01]     Priority: Medium   • Leukocytosis [D72.829]   • High anion gap metabolic acidosis [E87.2]   • Hypothyroidism [E03.9]   • Gastroesophageal reflux disease without esophagitis [K21.9]   • Hypertensive emergency [I16.1]       Past Surgical History:  Past Surgical History:   Procedure Laterality Date   • SHOULDER DECOMPRESSION ARTHROSCOPIC Left 2/19/2019    Procedure: SHOULDER DECOMPRESSION ARTHROSCOPIC - SUBACROMIAL;  Surgeon: Camila Elkins M.D.;  Location: [facility];  Service: Orthopedics   • CLAVICLE DISTAL EXCISION Left 2/19/2019    Procedure: CLAVICLE DISTAL EXCISION;  Surgeon: Camila Elkins M.D.;  Location: [facility];  Service: Orthopedics   • SHOULDER ARTHROSCOPY W/ BICIPITAL TENODESIS REPAIR Left 2/19/2019    Procedure: SHOULDER ARTHROSCOPY W/ BICIPITAL TENODESIS REPAIR;  Surgeon: Camila Elkins M.D.;  Location: [facility];  Service: Orthopedics   • GASTROSCOPY N/A 2/7/2019    Procedure: GASTROSCOPY- DIALATION AND BIOPSY;  Surgeon: Hola Veliz M.D.;  Location:  SURGERY [facility];  Service: Gastroenterology   • ABDOMINAL EXPLORATION  2002   • GASTRIC BYPASS LAPAROSCOPIC  1999   • HYSTERECTOMY, TOTAL ABDOMINAL  1995   • APPENDECTOMY  1974       Hospital Medications:    Current Facility-Administered Medications:   •  cevimeline (EVOXAC) CAPS 30 mg, 30 mg, Oral, TID, Alber Chin D.O., 30 mg at 11/05/19 1200  •  DULoxetine (CYMBALTA) capsule 60 mg, 60 mg, Oral, DAILY, Alber Chin D.O., 60 mg at 11/05/19 0527  •  estradiol (ESTRACE) tablet 0.5 mg, 0.5 mg, Oral, DAILY, ANIL Ortiz.O., 0.5 mg at 11/05/19 0527  •  gabapentin (NEURONTIN) capsule 400 mg, 400 mg, Oral, TID, Alber Chin D.O., 400 mg at 11/05/19 1449  •  liothyronine (CYTOMEL) tablet 25 mcg, 25 mcg, Oral, DAILY, Alber Chin D.O., 25 mcg at 11/05/19 0922  •  losartan (COZAAR) tablet 25 mg, 25 mg, Oral, QDAY, Alber Chin D.O., 25 mg at 11/05/19 0528  •  magnesium oxide (MAG-OX) tablet 400 mg, 400 mg, Oral, Q EVENING, ANIL Ortiz.O.  •  predniSONE (DELTASONE) tablet 20 mg, 20 mg, Oral, DAILY, ANIL Ortiz.O., 20 mg at 11/05/19 0529  •  levothyroxine (SYNTHROID) tablet 75 mcg, 75 mcg, Oral, AM ES, Alber Chin D.O., 75 mcg at 11/05/19 0526  •  tizanidine (ZANAFLEX) tablet 4 mg, 4 mg, Oral, Q6HRS PRN, ANIL Ortiz.O.  •  senna-docusate (PERICOLACE or SENOKOT S) 8.6-50 MG per tablet 2 Tab, 2 Tab, Oral, BID **AND** polyethylene glycol/lytes (MIRALAX) PACKET 1 Packet, 1 Packet, Oral, QDAY PRN **AND** magnesium hydroxide (MILK OF MAGNESIA) suspension 30 mL, 30 mL, Oral, QDAY PRN **AND** bisacodyl (DULCOLAX) suppository 10 mg, 10 mg, Rectal, QDAY PRN, Alber Chin D.O.  •  hydrOXYzine (VISTARIL) 25 MG/ML injection 25 mg, 25 mg, Intramuscular, Q6HRS PRN, Alber Chin D.O., 25 mg at 11/05/19 0935  •  Respiratory Care per Protocol, , Nebulization, Continuous RT, Alber Chin D.O.  •  enoxaparin (LOVENOX) inj 40 mg, 40 mg, Subcutaneous, DAILY, Alber Chin,  D.O., 40 mg at 11/05/19 0525  •  acetaminophen (TYLENOL) tablet 650 mg, 650 mg, Oral, Q6HRS PRN, ANIL Ortiz.O.  •  enalaprilat (VASOTEC) injection 1.25 mg, 1.25 mg, Intravenous, Q6HRS PRN, ANIL Ortiz.O.  •  cloNIDine (CATAPRES) tablet 0.1 mg, 0.1 mg, Oral, Q6HRS PRN, ANIL Ortiz.O.  •  ondansetron (ZOFRAN) syringe/vial injection 4 mg, 4 mg, Intravenous, Q4HRS PRN, ANIL Ortiz.O.  •  ondansetron (ZOFRAN ODT) dispertab 4 mg, 4 mg, Oral, Q4HRS PRN, ANIL Ortiz.O.  •  promethazine (PHENERGAN) tablet 12.5-25 mg, 12.5-25 mg, Oral, Q4HRS PRN, ANIL Ortiz.O.  •  promethazine (PHENERGAN) suppository 12.5-25 mg, 12.5-25 mg, Rectal, Q4HRS PRN, ANIL Ortiz.O.  •  prochlorperazine (COMPAZINE) injection 5-10 mg, 5-10 mg, Intravenous, Q4HRS PRN, ANIL Ortiz.O.  •  furosemide (LASIX) injection 40 mg, 40 mg, Intravenous, BID DIURETIC, ANIL Ortiz.O., 40 mg at 11/05/19 0525  •  potassium chloride SA (Kdur) tablet 40 mEq, 40 mEq, Oral, BID, ANIL Ortiz.O., 40 mEq at 11/05/19 0525  •  carvedilol (COREG) tablet 6.25 mg, 6.25 mg, Oral, BID WITH MEALS, ANIL Ortiz.O., 6.25 mg at 11/05/19 0922  •  HYDROmorphone pf (DILAUDID) injection 1 mg, 1 mg, Intravenous, Q3HRS PRN, ANIL Ortiz.O., 1 mg at 11/05/19 1449  •  LORazepam (ATIVAN) injection 0.5 mg, 0.5 mg, Intravenous, Q4HRS PRN, Alber Chin D.O.  •  nitroglycerin (NITRO-BID) 2 % ointment 0.5 Inch, 0.5 Inch, Topical, Q6HRS, Alber Chin D.O., Stopped at 11/05/19 1200  •  omeprazole (PRILOSEC) capsule 20 mg, 20 mg, Oral, DAILY, Alber Chin D.O., 20 mg at 11/05/19 0526    Current Outpatient Medications:  Medications Prior to Admission   Medication Sig Dispense Refill Last Dose   • acetaminophen (TYLENOL) 500 MG Tab Take 1,000 mg by mouth every 6 hours as needed for Moderate Pain.   11/3/2019 at PM   • DULoxetine (CYMBALTA) 60 MG Cap DR Particles delayed-release capsule Take 60 mg by mouth every  evening.   11/3/2019 at PM   • Frovatriptan Succinate (FROVA) 2.5 MG Tab Take 2.5 mg by mouth as needed (For migraines).   11/4/2019 at 1100   • hydrOXYzine HCl (ATARAX) 25 MG Tab Take 25 mg by mouth every bedtime. For sleep   11/3/2019 at PM   • losartan (COZAAR) 25 MG Tab Take 25 mg by mouth every evening.   11/3/2019 at PM   • montelukast (SINGULAIR) 10 MG Tab Take 10 mg by mouth every evening.   11/3/2019 at PM   • pantoprazole (PROTONIX) 40 MG Tablet Delayed Response Take 40 mg by mouth every evening.   11/3/2019 at PM   • tizanidine (ZANAFLEX) 4 MG Tab Take 2-4 mg by mouth every 6 hours as needed. Indications: Muscle Spasticity   11/4/2019 at 1200   • predniSONE (DELTASONE) 20 MG Tab Take 20 mg by mouth every day.   11/4/2019 at 0600   • FARXIGA 5 MG Tab Take 5 mg by mouth every evening.   11/3/2019 at PM   • calcitonin, salmon, (MIACALCIN) 200 UNIT/ACT Solution Spray 1 Spray in nose every day.  12 NEW RX   • meloxicam (MOBIC) 15 MG tablet Take 15 mg by mouth every evening.   11/3/2019 at PM   • furosemide (LASIX) 20 MG Tab TAKE 0.5 TABS BY MOUTH 2 TIMES A DAY. 90 Tab 1 11/4/2019 at 0600   • Cyanocobalamin (B-12) 1000 MCG/ML Kit 1,000 mcg by Injection route every 7 days. On Tue   10/29/2019 at Unknown   • colesevelam (WELCHOL) 625 MG Tab Take 625 mg by mouth 3 times a day, with meals.   11/4/2019 at 1100   • EPINEPHrine (EPIPEN) 0.3 MG/0.3ML Solution Auto-injector solution for injection 0.3 mg by Intramuscular route Once. Indications: Life-Threatening Hypersensitivity Reaction   10/31/2019 at Unknown   • cevimeline (EVOXAC) 30 MG capsule Take 1 Cap by mouth 3 times a day. 270 Cap 0 11/4/2019 at 1100   • cetirizine (KLS ALLER-HAWA) 10 MG Tab Take 20 mg by mouth 2 times a day.   11/4/2019 at 0600   • liothyronine (CYTOMEL) 25 MCG Tab Take 25 mcg by mouth every evening.   11/3/2019 at PM   • AIMOVIG 70 MG/ML Solution Auto-injector 1 mL by Injection route Q30 DAYS.   10/1/2019 at Unknown   • estradiol (ESTRACE) 0.5  "MG tablet Take 0.5 mg by mouth every day.   11/4/2019 at 0600   • SYNTHROID 75 MCG Tab Take 75 mcg by mouth every evening.   11/3/2019 at PM   • potassium chloride SA (KDUR) 20 MEQ Tab CR Take 1 Tab by mouth every day. 30 Tab 1 11/4/2019 at 0600   • gabapentin (NEURONTIN) 400 MG Cap Take 1 Cap by mouth 3 times a day. 180 Cap 3 11/4/2019 at 1100   • Coral Calcium 1000 (390 Ca) MG Tab Take 1,000 mg by mouth every evening.   11/3/2019 at PM   • magnesium oxide (MAG-OX) 400 MG Tab Take 400 mg by mouth every evening.   11/3/2019 at PM   • Biotin 5000 MCG Cap Take 1 Cap by mouth every day.   11/4/2019 at 1100   • Cholecalciferol (VITAMIN D) 2000 units Cap Take 2,000 Units by mouth every evening.   11/3/2019 at PM   • Probiotic Product (PROBIOTIC PO) Take 1 Tab by mouth every day.   11/4/2019 at 2100   • Menaquinone-7 (VITAMIN K2) 100 MCG Cap Take 1 Tab by mouth every day.   11/4/2019 at 1100   • multivitamin (THERAGRAN) Tab Take 1 Tab by mouth every day.   11/4/2019 at 1100       Medication Allergy:  Allergies   Allergen Reactions   • Bactrim [Sulfamethoxazole W-Trimethoprim] Shortness of Breath   • Bee Venom Anaphylaxis   • Econazole Anaphylaxis   • Floxin [Ofloxacin] Anaphylaxis, Shortness of Breath and Swelling     Cause throat swelling and difficulty breathing   • Gadolinium Derivatives Hives and Swelling     Throat swelling   • Iodine Shortness of Breath and Anaphylaxis     Throat and tongue swelling with IV contrast   • Keflex Shortness of Breath   • Levaquin Shortness of Breath     anaphlyaxis   • Levofloxacin Shortness of Breath   • Morphine Anaphylaxis   • Naloxone Hives     \"hives, SOB\"   • Nitrofurantoin Shortness of Breath     ...and hives     • Norco [Apap-Fd&C Yellow #10 Al Tai-Hydrocodone] Shortness of Breath     ...and hives     • Oxycodone Shortness of Breath     ...and hives     • Penicillins Shortness of Breath     ...and hives     • Tape Contact Dermatitis     Blisters, clear tegaderm ok.. No " "steristrips.   • Ancef [Cefazolin] Hives   • Bextra [Valdecoxib] Rash     \"Skin burn and peeling.\"   • Flagyl [Kdc:Metronidazole+Tartrazine] Hives   • Linezolid Rash     Rash all over body   • Other Drug Hives     \"Enconazole nitrate.\" shortness of breath and hives   • Other Misc Unspecified     Adhesive tape: blisters, skin peels off   • Buprenorphine Anaphylaxis   • Clindamycin      HIVES     • Tygacil [Tigecycline]      itching   • Zithromax [Azithromycin] Hives and Shortness of Breath     Pt had Hives also       Family History:  Family History   Problem Relation Age of Onset   • Heart Disease Mother    • Diabetes Mother    • Heart Failure Mother    • Hypertension Mother    • Hypertension Father    • Heart Disease Brother    • Heart Attack Brother    • Hypertension Brother        Social History:  Social History     Socioeconomic History   • Marital status:      Spouse name: Not on file   • Number of children: Not on file   • Years of education: Not on file   • Highest education level: Not on file   Occupational History   • Not on file   Social Needs   • Financial resource strain: Not on file   • Food insecurity:     Worry: Never true     Inability: Never true   • Transportation needs:     Medical: No     Non-medical: No   Tobacco Use   • Smoking status: Never Smoker   • Smokeless tobacco: Never Used   Substance and Sexual Activity   • Alcohol use: Yes     Frequency: 2-4 times a month     Drinks per session: 1 or 2     Binge frequency: Never     Comment: 1-2 /month    • Drug use: No   • Sexual activity: Not on file   Lifestyle   • Physical activity:     Days per week: Not on file     Minutes per session: Not on file   • Stress: Not on file   Relationships   • Social connections:     Talks on phone: Not on file     Gets together: Not on file     Attends [employer] service: Not on file     Active member of club or organization: Not on file     Attends meetings of clubs or organizations: Not on file     " "Relationship status: Not on file   • Intimate partner violence:     Fear of current or ex partner: Not on file     Emotionally abused: Not on file     Physically abused: Not on file     Forced sexual activity: Not on file   Other Topics Concern   • Not on file   Social History Narrative   • Not on file         Physical Exam:  Vitals/ General Appearance:   Weight/BMI: Body mass index is 32.77 kg/m².  /99   Pulse 96   Temp 36.8 °C (98.3 °F) (Oral)   Resp 20   Ht 1.626 m (5' 4\")   Wt 86.6 kg (190 lb 14.7 oz)   SpO2 98%   Vitals:    11/05/19 0922 11/05/19 1134 11/05/19 1200 11/05/19 1316   BP: 135/99 101/55 (!) 98/57 135/99   Pulse:  81 76 96   Resp:  20     Temp:  36.8 °C (98.3 °F)     TempSrc:  Oral     SpO2:  95%  98%   Weight:       Height:         Oxygen Therapy:  Pulse Oximetry: 98 %(4 L NC), O2 (LPM): 4, O2 Delivery: Nasal Cannula    Constitutional:   Well developed, Well nourished, No acute distress  HENMT:  Normocephalic, Atraumatic, Oropharynx moist mucous membranes, No oral exudates, Nose normal.  No thyromegaly.  Eyes:  EOMI, Conjunctiva normal, No discharge.  Neck:  Normal range of motion, No cervical tenderness,  no JVD.  Cardiovascular:  Normal heart rate, Normal rhythm, No murmurs, No rubs, No gallops.   Extremitites with intact distal pulses, no cyanosis, or edema.  Lungs:  Normal breath sounds, breath sounds clear to auscultation bilaterally,  no rales, no rhonchi, no wheezing.   Abdomen: Bowel sounds normal, Soft, No tenderness, No guarding, No rebound, No masses, No hepatosplenomegaly.  Skin: Warm, Dry, No erythema, No rash, no induration.  Neurologic: Alert & oriented x 3, No focal deficits noted, cranial nerves II through X are intact.  Psychiatric: Affect normal, Judgment normal, Mood normal.      MDM (Data Review):     Records reviewed and summarized in current documentation    Lab Data Review:  Recent Results (from the past 24 hour(s))   EKG    Collection Time: 11/04/19  9:47 PM "   Result Value Ref Range    Report       [facility] Emergency Dept.    Test Date:  2019  Pt Name:    CARMINA MORAN              Department: EDSM  MRN:        [id number]                      Room:       -ROOM 8  Gender:     Female                       Technician: OLINDA  :        1965                   Requested By:ER TRIAGE PROTOCOL  Order #:    [id number]                    Reading MD:    Measurements  Intervals                                Axis  Rate:       107                          P:          70  DE:         202                          QRS:        -21  QRSD:       118                          T:          118  QT:         354  QTc:        473    Interpretive Statements  Ventricular-paced complexes  No further rhythm analysis attempted due to paced rhythm  Borderline prolonged DE interval  Probable left atrial enlargement  Incomplete left bundle branch block  LVH with secondary repolarization abnormality  Anterior Q waves, possibly due to LVH  Borderline ST elevation, inferior lead s  Baseline wander in lead(s) III,aVF,V1,V2,V4,V5  Compared to ECG 2019 11:14:45  Left bundle-branch block now present  Left ventricular hypertrophy now present  Early repolarization now present  Q waves now present  ST (T wave) deviation now present  Sinus tachycardia no longer present  T-wave abnormality no longer present     CBC w/ Differential    Collection Time: 19 10:44 PM   Result Value Ref Range    WBC 12.6 (H) 4.8 - 10.8 K/uL    RBC 5.06 4.20 - 5.40 M/uL    Hemoglobin 14.2 12.0 - 16.0 g/dL    Hematocrit 44.2 37.0 - 47.0 %    MCV 87.4 81.4 - 97.8 fL    MCH 28.1 27.0 - 33.0 pg    MCHC 32.1 (L) 33.6 - 35.0 g/dL    RDW 45.1 35.9 - 50.0 fL    Platelet Count 321 164 - 446 K/uL    MPV 10.2 9.0 - 12.9 fL    Neutrophils-Polys 69.90 44.00 - 72.00 %    Lymphocytes 18.20 (L) 22.00 - 41.00 %    Monocytes 11.10 0.00 - 13.40 %    Eosinophils 0.20 0.00 - 6.90 %    Basophils 0.20 0.00 -  1.80 %    Immature Granulocytes 0.40 0.00 - 0.90 %    Nucleated RBC 0.00 /100 WBC    Neutrophils (Absolute) 8.82 (H) 2.00 - 7.15 K/uL    Lymphs (Absolute) 2.30 1.00 - 4.80 K/uL    Monos (Absolute) 1.40 (H) 0.00 - 0.85 K/uL    Eos (Absolute) 0.02 0.00 - 0.51 K/uL    Baso (Absolute) 0.03 0.00 - 0.12 K/uL    Immature Granulocytes (abs) 0.05 0.00 - 0.11 K/uL    NRBC (Absolute) 0.00 K/uL   Complete Metabolic Panel (CMP)    Collection Time: 11/04/19 10:44 PM   Result Value Ref Range    Sodium 137 135 - 145 mmol/L    Potassium 3.7 3.6 - 5.5 mmol/L    Chloride 103 96 - 112 mmol/L    Co2 17 (L) 20 - 33 mmol/L    Anion Gap 17.0 (H) 0.0 - 11.9    Glucose 136 (H) 65 - 99 mg/dL    Bun 15 8 - 22 mg/dL    Creatinine 0.84 0.50 - 1.40 mg/dL    Calcium 9.4 8.4 - 10.2 mg/dL    AST(SGOT) 29 12 - 45 U/L    ALT(SGPT) 29 2 - 50 U/L    Alkaline Phosphatase 92 30 - 99 U/L    Total Bilirubin 0.3 0.1 - 1.5 mg/dL    Albumin 4.4 3.2 - 4.9 g/dL    Total Protein 7.4 6.0 - 8.2 g/dL    Globulin 3.0 1.9 - 3.5 g/dL    A-G Ratio 1.5 g/dL   Troponin STAT    Collection Time: 11/04/19 10:44 PM   Result Value Ref Range    Troponin T 20 (H) 6 - 19 ng/L   proBrain Natriuretic Peptide, NT    Collection Time: 11/04/19 10:44 PM   Result Value Ref Range    NT-proBNP 1575 (H) 0 - 125 pg/mL   Lipase    Collection Time: 11/04/19 10:44 PM   Result Value Ref Range    Lipase 45 7 - 58 U/L   PT/INR    Collection Time: 11/04/19 10:44 PM   Result Value Ref Range    PT 12.0 12.0 - 14.6 sec    INR 0.88 0.87 - 1.13   APTT    Collection Time: 11/04/19 10:44 PM   Result Value Ref Range    APTT 25.5 24.7 - 36.0 sec   TSH (for screening thyroid dysfunction)    Collection Time: 11/04/19 10:44 PM   Result Value Ref Range    TSH 1.240 0.380 - 5.330 uIU/mL   ESTIMATED GFR    Collection Time: 11/04/19 10:44 PM   Result Value Ref Range    GFR If African American >60 >60 mL/min/1.73 m 2    GFR If Non African American >60 >60 mL/min/1.73 m 2   EKG    Collection Time: 11/04/19 11:44 PM    Result Value Ref Range    Report       [facility] Emergency Dept.    Test Date:  2019  Pt Name:    CARMINA MORAN              Department: E[facility]M  MRN:        [id number]                      Room:       -ROOM 8  Gender:     Female                       Technician: DORIAN  :        1965                   Requested By:MARIA D HOWARD  Order #:    [id number]                    Reading MD:    Measurements  Intervals                                Axis  Rate:       115                          P:          34  AL:         174                          QRS:        -24  QRSD:       116                          T:          136  QT:         330  QTc:        457    Interpretive Statements  Sinus tachycardia  LVH with IVCD and secondary repol abnrm  Borderline ST elevation, inferior leads  Compared to ECG 2019 21:47:07  Intraventricular conduction delay now present  Ventricular-paced complex(es) or rhythm no longer present  Left bundle-branch block no longer present  Q waves no longer present   ST (T wave) deviation still present     TROPONIN    Collection Time: 19  4:15 AM   Result Value Ref Range    Troponin T 93 (H) 6 - 19 ng/L   PROCALCITONIN    Collection Time: 19  4:15 AM   Result Value Ref Range    Procalcitonin 0.09 <0.25 ng/mL   LACTIC ACID    Collection Time: 19  4:15 AM   Result Value Ref Range    Lactic Acid 5.4 (HH) 0.5 - 2.0 mmol/L   LACTIC ACID    Collection Time: 19  6:50 AM   Result Value Ref Range    Lactic Acid 4.3 (HH) 0.5 - 2.0 mmol/L   EKG    Collection Time: 19  7:27 AM   Result Value Ref Range    Report       [facility]    Test Date:  2019  Pt Name:    CARMINA MORAN              Department: MED  MRN:        [id number]                      Room:       [facility]  Gender:     Female                       Technician: ALEJANDRA  :        1965                   Requested By:ADINA BENAVIDES  Order #:    [id number]                     Reading MD:    Measurements  Intervals                                Axis  Rate:       99                           P:          42  OR:         197                          QRS:        -23  QRSD:       114                          T:          132  QT:         397  QTc:        510    Interpretive Statements  Sinus rhythm  Borderline prolonged OR interval  LVH with IVCD and secondary repol abnrm  Borderline ST elevation, inferior leads  Borderline prolonged QT interval  Compared to ECG 11/04/2019 23:44:28  Sinus tachycardia no longer present  ST (T wave) deviation still present     EC-ECHOCARDIOGRAM COMPLETE W/ CONT    Collection Time: 11/05/19  8:58 AM   Result Value Ref Range    Eject.Frac. MOD BP 5.291     Eject.Frac. MOD 4C 3.794     Eject.Frac. MOD 2C 9.608     Left Ventrical Ejection Fraction 20    TROPONIN    Collection Time: 11/05/19  9:00 AM   Result Value Ref Range    Troponin T 73 (H) 6 - 19 ng/L   Basic Metabolic Panel    Collection Time: 11/05/19  2:18 PM   Result Value Ref Range    Sodium 135 135 - 145 mmol/L    Potassium 4.2 3.6 - 5.5 mmol/L    Chloride 97 96 - 112 mmol/L    Co2 22 20 - 33 mmol/L    Glucose 108 (H) 65 - 99 mg/dL    Bun 21 8 - 22 mg/dL    Creatinine 1.10 0.50 - 1.40 mg/dL    Calcium 9.0 8.4 - 10.2 mg/dL    Anion Gap 16.0 (H) 0.0 - 11.9   LACTIC ACID    Collection Time: 11/05/19  2:18 PM   Result Value Ref Range    Lactic Acid 3.0 (H) 0.5 - 2.0 mmol/L   ESTIMATED GFR    Collection Time: 11/05/19  2:18 PM   Result Value Ref Range    GFR If African American >60 >60 mL/min/1.73 m 2    GFR If Non African American 52 (A) >60 mL/min/1.73 m 2       Imaging/Procedures Review:    Chest Xray:  Reviewed    EKG:   As in HPI.     MDM (Assessment and Plan):     Active Hospital Problems    Diagnosis   • Chest pain [R07.9]     Priority: Medium   • Acute respiratory failure with hypoxia (HCC) [J96.01]     Priority: Medium   • Leukocytosis [D72.829]   • High anion gap metabolic acidosis [E87.2]    • Hypothyroidism [E03.9]   • Gastroesophageal reflux disease without esophagitis [K21.9]   • Hypertensive emergency [I16.1]       At this time, her clinical presentation is consistent with hypertensive heart disease and hypertensive emergency.  Patient is not having an acute coronary syndrome.  No indication for invasive cardiac testing at this time.  We will optimize blood pressure control.  In terms of her cardiomyopathy with LVEF of 20%, I do suspect that there is some component of hypertensive heart disease causing cardiomyopathy along with possible stress-induced cardiomyopathy.  Per patient's report, she Mele had an invasive coronary angiogram done in [place] in 2015 which showed no evidence of obstructive CAD.  Therefore, I am not inclined to repeat such invasive cardiac testing.  Certainly ischemic work-up will need to be done.  However, this could be done in the outpatient setting with nuclear stress test.  In addition, patient does not have signs and evidence of heart failure exacerbation at this time.    We will optimize cardiac regimen for her cardiomyopathy based on guidelines directed medical therapy.  We will increase carvedilol to 12.5 mg p.o. twice a day.  Trial of losartan 25 mg p.o. once a day and spironolactone 25 mg p.o. once a day.    Patient would like to go home.  I do think that she is stable to be discharged from cardiology standpoint once blood pressure is better control.    Alejandra Wilson MD.   Cardiology Inpatient Service.  [facility].  [phone].  [place], [place].

## 2019-11-05 NOTE — ASSESSMENT & PLAN NOTE
-I did personally review her CTA of the chest, noted bibasilar pulmonary edema  -Most recent echocardiogram had a preserved ejection fraction, will repeat due to new fluid overload causing hypoxia  -Trend troponin  -Start IV Lasix  -Medical management  -Obtain procalcitonin to rule out pneumonia however she does not feel sick

## 2019-11-05 NOTE — FLOWSHEET NOTE
11/05/19 0854   Events/Summary/Plan   Events/Summary/Plan Rapid response called for CP.   Respiratory WDL   Respiratory (WDL) X   Chest Exam   Respiration (!) 22   Heart Rate (Monitored) 90   Breath Sounds   RUL Breath Sounds Clear   RML Breath Sounds Clear   RLL Breath Sounds Diminished   TRISTAN Breath Sounds Clear   LLL Breath Sounds Diminished   Oximetry   #Pulse Oximetry (Single Determination) Yes   Oxygen   Pulse Oximetry 99 %   O2 (LPM) 4   O2 Daily Delivery Respiratory  Silicone Nasal Cannula

## 2019-11-05 NOTE — PROGRESS NOTES
EKG showed borderline ST elevation. trops were 20 and went to 93.  Pt has a nitro patch on, complaining of 7/10 substernal 'crushing' chest pain.  Vitals taken. 3L o2 already on.  Dilaudid used as pain management.  Called Md Garcia, Stat trop ordered.  After consulting with NAM and ICU, Rapid Response called.  Lab came to draw stat troponin, EKG already done, chest xray/CT already done.  Awaiting trops for new orders.

## 2019-11-05 NOTE — ASSESSMENT & PLAN NOTE
-Patient has had a stress test this year, no need to repeat  -I will repeat an echocardiogram  -Trend troponin  -Monitor patient on telemetry  -Symptomatic care

## 2019-11-05 NOTE — ED PROVIDER NOTES
"ED Provider Note    ED Provider Note    Scribed for Radha Silva MD by Radha Silva. 11/4/2019, 11:04 PM.    Primary care provider: Benson Ramirez M.D.  Means of arrival: Private  History obtained from: Patient  History limited by: None    CHIEF COMPLAINT  Chief Complaint   Patient presents with   • Chest Pain     started about 20 minutes ago   strong Hx of cardiac issue   • N/V       HPI  Donna Isaac is a 54 y.o. female who presents to the Emergency Department for evaluation of chest pain.  Patient has history of CHF as well as abdominal aortic aneurysm, she relates she was last told it was 4.5 cm in diameter.  Patient relates starting today, about an hour and a half prior to arrival she began having intense pressure-like pain as of \"truck is sitting on my chest\".  This occurred at rest, associated dyspnea but no productive cough.  Patient relates she has had fevers for a few days but this is thought to be reaction from a medication she is on for parathyroidism.  No obvious exertional component to the onset of pain but she knows minimal exertion does make it worse.  She is hyperventilating and somewhat anxious upon arrival.  Patient notes sensation is similar to when she was diagnosed with an MI in the past.  She has multiple medical allergies, but notes she can tolerate contrast that she is premedicated with Solu-Medrol and Benadryl.    REVIEW OF SYSTEMS  Pertinent positives include chest pain, dyspnea, orthopnea, hypertension, peripheral edema. Pertinent negatives include no acute fever, no productive cough, no wheezing,.  All other systems reviewed and negative.    PAST MEDICAL HISTORY   has a past medical history of AAA (abdominal aortic aneurysm) (HCC), Asthma, Chronic pain, Congestive heart failure (HCC), Fibromyalgia, GERD (gastroesophageal reflux disease), Hypertension, Migraine, Osteoporosis, Renal disorder, and Urticaria.    SURGICAL HISTORY   has a past surgical history that " includes appendectomy (1974); gastroscopy (N/A, 2/7/2019); hysterectomy, total abdominal (1995); gastric bypass laparoscopic (1999); abdominal exploration (2002); shoulder decompression arthroscopic (Left, 2/19/2019); clavicle distal excision (Left, 2/19/2019); and shoulder arthroscopy w/ bicipital tenodesis repair (Left, 2/19/2019).    SOCIAL HISTORY  Social History     Tobacco Use   • Smoking status: Never Smoker   • Smokeless tobacco: Never Used   Substance Use Topics   • Alcohol use: Yes     Frequency: 2-4 times a month     Drinks per session: 1 or 2     Binge frequency: Never     Comment: 1-2 /month    • Drug use: No      Social History     Substance and Sexual Activity   Drug Use No       FAMILY HISTORY  Family History   Problem Relation Age of Onset   • Heart Disease Mother    • Diabetes Mother    • Heart Failure Mother    • Hypertension Mother    • Hypertension Father    • Heart Disease Brother    • Heart Attack Brother    • Hypertension Brother        CURRENT MEDICATIONS  Home Medications     Reviewed by Jose Orellana R.N. (Registered Nurse) on 11/05/19 at 0215  Med List Status: Complete   Medication Last Dose Status   AIMOVIG 70 MG/ML Solution Auto-injector 10/1/2019 Active   albuterol 108 (90 Base) MCG/ACT Aero Soln inhalation aerosol  Active   Biotin 5000 MCG Cap 11/4/2019 Active   calcitonin, salmon, (MIACALCIN) 200 UNIT/ACT Solution 11/3/2019 Active   cetirizine (KLS ALLER-HAWA) 10 MG Tab 11/3/2019 Active   cevimeline (EVOXAC) 30 MG capsule 11/4/2019 Active   Cholecalciferol (VITAMIN D) 2000 units Cap 11/4/2019 Active   clotrimazole (LOTRIMIN) 1 % Solution 11/3/2019 Active   colesevelam (WELCHOL) 625 MG Tab 11/4/2019 Active   Coral Calcium 1000 (390 Ca) MG Tab 11/4/2019 Active   Cyanocobalamin (B-12) 1000 MCG/ML Kit 10/29/2019 Active   DULoxetine (CYMBALTA) 60 MG Cap DR Particles delayed-release capsule 11/3/2019 Active   EPINEPHrine (EPIPEN) 0.3 MG/0.3ML Solution Auto-injector solution for  "injection 10/31/2019 Active   estradiol (ESTRACE) 0.5 MG tablet 11/4/2019 Active   FARXIGA 5 MG Tab 11/4/2019 Active   Frovatriptan Succinate 2.5 MG Tab 11/4/2019 Active   furosemide (LASIX) 20 MG Tab 11/4/2019 Active   gabapentin (NEURONTIN) 400 MG Cap 11/4/2019 Active   hydrOXYzine Pamoate (VISTARIL PO) 11/4/2019 Active   liothyronine (CYTOMEL) 25 MCG Tab 11/4/2019 Active   losartan (COZAAR) 25 MG Tab 11/4/2019 Active   magnesium oxide (MAG-OX) 400 MG Tab 11/4/2019 Active   meloxicam (MOBIC) 15 MG tablet 11/4/2019 Active   Menaquinone-7 (VITAMIN K2) 100 MCG Cap 11/4/2019 Active   montelukast (SINGULAIR) 10 MG Tab 11/4/2019 Active   multivitamin (THERAGRAN) Tab 11/4/2019 Active   pantoprazole (PROTONIX) 40 MG Tablet Delayed Response 11/3/2019 Active   potassium chloride SA (KDUR) 20 MEQ Tab CR 11/4/2019 Active   predniSONE (DELTASONE) 20 MG Tab 11/4/2019 Active   Probiotic Product (PROBIOTIC PO) 11/4/2019 Active   raNITidine (ZANTAC) 150 MG Tab  Active   SYNTHROID 75 MCG Tab 11/4/2019 Active   tizanidine (ZANAFLEX) 4 MG Tab 11/4/2019 Active                ALLERGIES  Allergies   Allergen Reactions   • Bactrim [Sulfamethoxazole W-Trimethoprim] Shortness of Breath   • Bee Venom Anaphylaxis   • Econazole Anaphylaxis   • Floxin [Ofloxacin] Anaphylaxis, Shortness of Breath and Swelling     Cause throat swelling and difficulty breathing   • Gadolinium Derivatives Hives and Swelling     Throat swelling   • Iodine Shortness of Breath and Anaphylaxis     Throat and tongue swelling with IV contrast   • Keflex Shortness of Breath   • Levaquin Shortness of Breath     anaphlyaxis   • Levofloxacin Shortness of Breath   • Morphine Anaphylaxis   • Naloxone Hives     \"hives, SOB\"   • Nitrofurantoin Shortness of Breath     ...and hives     • Norco [Apap-Fd&C Yellow #10 Al Tai-Hydrocodone] Shortness of Breath     ...and hives     • Oxycodone Shortness of Breath     ...and hives     • Penicillins Shortness of Breath     ...and hives   " "  • Tape Contact Dermatitis     Blisters, clear tegaderm ok.. No steristrips.   • Ancef [Cefazolin] Hives   • Bextra [Valdecoxib] Rash     \"Skin burn and peeling.\"   • Flagyl [Kdc:Metronidazole+Tartrazine] Hives   • Linezolid Rash     Rash all over body   • Other Drug Hives     \"Enconazole nitrate.\" shortness of breath and hives   • Other Misc Unspecified     Adhesive tape: blisters, skin peels off   • Buprenorphine Anaphylaxis   • Clindamycin      HIVES     • Tygacil [Tigecycline]      itching   • Zithromax [Azithromycin] Hives and Shortness of Breath     Pt had Hives also       PHYSICAL EXAM  VITAL SIGNS: /91   Pulse 94   Temp 36.9 °C (98.4 °F) (Oral)   Resp 18   Ht 1.626 m (5' 4\")   Wt 86.6 kg (190 lb 14.7 oz)   LMP 02/07/2016 (Within Months)   SpO2 95%   BMI 32.77 kg/m²     General: Alert, severe acute distress  Skin: Warm, dry, pale  Head: Normocephalic, atraumatic  Neck: Trachea midline, no tenderness  Eye: PERRL, normal conjunctiva  ENMT: Oral mucosa moist, no pharyngeal erythema or exudate  Cardiovascular: S1, S2, mildly tachycardic, otherwise regular rate and rhythm, No murmur, Normal peripheral perfusion.  2+ lower extremity peripheral edema.  Respiratory: Breath sounds diminished bilaterally more to the bases, no expiratory wheeze, mild respiratory distress and mild tachypnea, no rales or rhonchi.  Gastrointestinal: Soft, nontender, non distended  Musculoskeletal: No swelling, no deformity  Neurological: Alert and oriented to person, place, time, and situation  Lymphatics: No lymphadenopathy  Psychiatric: Cooperative, anxious with mood congruent affect      DIAGNOSTIC STUDIES/PROCEDURES    LABS  Results for orders placed or performed during the hospital encounter of 11/04/19   CBC w/ Differential   Result Value Ref Range    WBC 12.6 (H) 4.8 - 10.8 K/uL    RBC 5.06 4.20 - 5.40 M/uL    Hemoglobin 14.2 12.0 - 16.0 g/dL    Hematocrit 44.2 37.0 - 47.0 %    MCV 87.4 81.4 - 97.8 fL    MCH 28.1 27.0 " - 33.0 pg    MCHC 32.1 (L) 33.6 - 35.0 g/dL    RDW 45.1 35.9 - 50.0 fL    Platelet Count 321 164 - 446 K/uL    MPV 10.2 9.0 - 12.9 fL    Neutrophils-Polys 69.90 44.00 - 72.00 %    Lymphocytes 18.20 (L) 22.00 - 41.00 %    Monocytes 11.10 0.00 - 13.40 %    Eosinophils 0.20 0.00 - 6.90 %    Basophils 0.20 0.00 - 1.80 %    Immature Granulocytes 0.40 0.00 - 0.90 %    Nucleated RBC 0.00 /100 WBC    Neutrophils (Absolute) 8.82 (H) 2.00 - 7.15 K/uL    Lymphs (Absolute) 2.30 1.00 - 4.80 K/uL    Monos (Absolute) 1.40 (H) 0.00 - 0.85 K/uL    Eos (Absolute) 0.02 0.00 - 0.51 K/uL    Baso (Absolute) 0.03 0.00 - 0.12 K/uL    Immature Granulocytes (abs) 0.05 0.00 - 0.11 K/uL    NRBC (Absolute) 0.00 K/uL   Complete Metabolic Panel (CMP)   Result Value Ref Range    Sodium 137 135 - 145 mmol/L    Potassium 3.7 3.6 - 5.5 mmol/L    Chloride 103 96 - 112 mmol/L    Co2 17 (L) 20 - 33 mmol/L    Anion Gap 17.0 (H) 0.0 - 11.9    Glucose 136 (H) 65 - 99 mg/dL    Bun 15 8 - 22 mg/dL    Creatinine 0.84 0.50 - 1.40 mg/dL    Calcium 9.4 8.4 - 10.2 mg/dL    AST(SGOT) 29 12 - 45 U/L    ALT(SGPT) 29 2 - 50 U/L    Alkaline Phosphatase 92 30 - 99 U/L    Total Bilirubin 0.3 0.1 - 1.5 mg/dL    Albumin 4.4 3.2 - 4.9 g/dL    Total Protein 7.4 6.0 - 8.2 g/dL    Globulin 3.0 1.9 - 3.5 g/dL    A-G Ratio 1.5 g/dL   Troponin STAT   Result Value Ref Range    Troponin T 20 (H) 6 - 19 ng/L   proBrain Natriuretic Peptide, NT   Result Value Ref Range    NT-proBNP 1575 (H) 0 - 125 pg/mL   Lipase   Result Value Ref Range    Lipase 45 7 - 58 U/L   PT/INR   Result Value Ref Range    PT 12.0 12.0 - 14.6 sec    INR 0.88 0.87 - 1.13   APTT   Result Value Ref Range    APTT 25.5 24.7 - 36.0 sec   TSH (for screening thyroid dysfunction)   Result Value Ref Range    TSH 1.240 0.380 - 5.330 uIU/mL   ESTIMATED GFR   Result Value Ref Range    GFR If African American >60 >60 mL/min/1.73 m 2    GFR If Non African American >60 >60 mL/min/1.73 m 2   EKG   Result Value Ref Range     Report       [facility].    Test Date:  2019  Pt Name:    CARMINA MORAN              Department: EDSM  MRN:        [id number]                      Room:       [facility] 8  Gender:     Female                       Technician: OLINDA  :        1965                   Requested By:ER TRIAGE PROTOCOL  Order #:    [id number]                    Reading MD:    Measurements  Intervals                                Axis  Rate:       107                          P:          70  MT:         202                          QRS:        -21  QRSD:       118                          T:          118  QT:         354  QTc:        473    Interpretive Statements  Ventricular-paced complexes  No further rhythm analysis attempted due to paced rhythm  Borderline prolonged MT interval  Probable left atrial enlargement  Incomplete left bundle branch block  LVH with secondary repolarization abnormality  Anterior Q waves, possibly due to LVH  Borderline ST elevation, inferior lead s  Baseline wander in lead(s) III,aVF,V1,V2,V4,V5  Compared to ECG 2019 11:14:45  Left bundle-branch block now present  Left ventricular hypertrophy now present  Early repolarization now present  Q waves now present  ST (T wave) deviation now present  Sinus tachycardia no longer present  T-wave abnormality no longer present     EKG   Result Value Ref Range    Report       [facility] Emergency Dept.    Test Date:  2019  Pt Name:    CARMINA MORAN              Department: EDS  MRN:        [id number]                      Room:       [facility] 8  Gender:     Female                       Technician: DORIAN  :        1965                   Requested By:MARIA D HOWARD  Order #:    [id number]                    Reading MD:    Measurements  Intervals                                Axis  Rate:       115                          P:          34  MT:         174                           QRS:        -24  QRSD:       116                          T:          136  QT:         330  QTc:        457    Interpretive Statements  Sinus tachycardia  LVH with IVCD and secondary repol abnrm  Borderline ST elevation, inferior leads  Compared to ECG 11/04/2019 21:47:07  Intraventricular conduction delay now present  Ventricular-paced complex(es) or rhythm no longer present  Left bundle-branch block no longer present  Q waves no longer present   ST (T wave) deviation still present       All labs reviewed by me, presently elevated BNP, troponin nearly within range, otherwise unremarkable.    EKG  12 Lead EKG obtained at 2149 and interpreted by me to show:  Rhythm: Sinus tachycardia  Rate: 107  Axis: Left  Intervals: Normal  Q Waves: Normal  No diagnostic ST segment elevation  LVH, incomplete left bundle branch block  Clinical Impression: Normal EKG  Compared to IVCD new from previous EKG dated January 31, 2019.    12 Lead EKG obtained at 0002 and interpreted by me to show:  Rhythm: Sinus tachycardia  Rate: 115  Axis: Left  Intervals: Normal  Q Waves: Normal  No diagnostic ST segment elevation  LVH, incomplete left bundle branch block  Clinical Impression: Normal EKG  Compared to IVCD new from previous EKG dated January 31, 2019.    RADIOLOGY  CT-CTA COMPLETE THORACOABDOMINAL AORTA   Final Result         1.  Normal aorta without visualized aneurysm or dissection.   2.  Groundglass pulmonary opacities suggests edema or atypical infiltrate.   3.  Diverticulosis            DX-CHEST-PORTABLE (1 VIEW)   Final Result         1.  No acute cardiopulmonary disease.      EC-ECHOCARDIOGRAM LTD W/O CONT    (Results Pending)     The radiologist's interpretation of all radiological studies have been reviewed by me.    COURSE & MEDICAL DECISION MAKING  Pertinent Labs & Imaging studies reviewed. (See chart for details)    11:04 PM - Patient seen and examined at bedside. Patient will be treated with fentanyl, Zofran; also  "Solu-Medrol and diphenhydramine for premedication for CT. Ordered cardiac work-up and CT angiogram chest, abdomen, pelvis to evaluate aorta to evaluate her symptoms. The differential diagnoses include but are not limited to: Aortic dissection, acute coronary syndrome, acute CHF    2346: Patient reassessed, blood pressure is mildly improved this point, the 180s.  Given she is also tachycardic I have ordered a dose of labetalol.    0036: Blood pressure much improved with nitroglycerin, pain is also improved but still present.  Patient is no large to pick at this point, oxygen saturation on 4 L is 95%.  Blood pressure currently 125/91.  Thankfully there is no evidence of aortic dissection, I spoke with the hospitalist nonetheless who concurs need for admission given evidence of clear CHF and hypertensive emergency.    Patient Vitals for the past 24 hrs:   BP Temp Temp src Pulse Resp SpO2 Height Weight   11/05/19 0200 125/91 36.9 °C (98.4 °F) Oral 94 18 95 % -- 86.6 kg (190 lb 14.7 oz)   11/05/19 0126 109/78 -- -- 91 (!) 26 93 % -- --   11/05/19 0106 110/72 -- -- 87 15 95 % -- --   11/04/19 2350 -- -- -- (!) 129 -- 96 % -- --   11/04/19 2338 (!) 180/117 -- -- -- -- -- -- --   11/04/19 2209 (!) 190/138 (!) 35.6 °C (96 °F) Temporal (!) 115 18 96 % -- --   11/04/19 2206 -- -- -- -- -- -- 1.626 m (5' 4\") 86.1 kg (189 lb 13.1 oz)     Patient is critically ill.   The patient continues to have: Chest pain and dyspnea  The vital organ system that is affected is the: Cardiovascular and pulmonary  If untreated there is a high chance of deterioration into: Respiratory failure  And eventually death.   The critical care that I am providing today is: Antihypertensive management, diuresis  The critical that has been undertaken is medically complex.   There has been no overlap in critical care time.   Critical Care Time not including procedures: 45 minutes    Decision Making:  This is a 54 y.o. year old female who presents with acute " chest pain and dyspnea with diaphoresis and hypoxia.  Patient relates a history of aortic aneurysm.  As such if obtain CT angiogram, I think it is no evidence of aortic dissection acutely.  Troponin is only minimally out of range, EKG is not frankly ischemic, there is a new incomplete left bundle branch noted but there is appropriate discordance, thankfully this is unlikely to represent acute MI.  Nonetheless there is still concern for ACS and patient clinically has volume overload and pulmonary edema concerning for acute CHF.  Given elevated blood pressure and evidence of endorgan damage including acute CHF this is consistent with hypertensive emergency and antihypertensives were initiated the ED, both labetalol and topical nitroglycerin.    Patient is noted in improved but guarded condition to the care of Dr. Chin the hospitalist to medical telemetry    FINAL IMPRESSION  1. Pulmonary edema cardiac cause (HCC)    2. Hypertensive emergency    3. Acute congestive heart failure, unspecified heart failure type (HCC)    4. Precordial chest pain          Radha SCHAFFER (Gerardo), am scribing for, and in the presence of, Radha Silva MD.    Electronically signed by: Radha Silva (Scribe), 11/4/2019    Radha SCHAFFER MD personally performed the services described in this documentation, as scribed by Radha Silva in my presence, and it is both accurate and complete    The note accurately reflects work and decisions made by me.  Radha Silva  11/5/2019  2:56 AM

## 2019-11-05 NOTE — TELEPHONE ENCOUNTER
Ermias Arauz phone call, spoke with pt, and reviewed findings.  Upon chart review, this practice is following pt during hospital stay.  Reviewed findings with pt.  Pt states they found out I did not have heart attack but my heart is 20%.  Encouraged pt to contact this office to schedule hospital F[facility] with MD or [facility].  She verbalizes understanding and states no other concerns or questions at this time and is appreciative of information given.

## 2019-11-05 NOTE — PROGRESS NOTES
Report received from AILYN Nieves. Pt denies needs at this time. Safety precautions in place. Call light within reach.

## 2019-11-05 NOTE — TELEPHONE ENCOUNTER
DARLEEN/ryan    Pt's  Davonte to let CR know pt was admitted to telemetry last evening with chest pains.  This is for CR's information only.  Davonte would appreciate a call to assure you have this information, [phone].

## 2019-11-05 NOTE — ASSESSMENT & PLAN NOTE
-Start Coreg, continue home losartan  -Start Nitro-Bid  -Start PRN enalapril and clonidine  -Adjust as needed

## 2019-11-05 NOTE — H&P
Hospital Medicine History & Physical Note    Date of Service  11/5/2019    Primary Care Physician  Benson Ramirez M.D.    Consultants  None    Code Status  Full    Chief Complaint  Chest pain    History of Presenting Illness  54 y.o. female who presented 11/4/2019 with chest pain.  She states her chest pain started today while she was walking, located substernal, sharp and pressure, 10/10 at its worse.  She states it felt like a semitruck was sitting on her chest.  She states the pain is associated with shortness of breath as well as nausea and vomiting.  She states the pain is persistent.  She does complain of an occasional cough that is nonproductive.  She states she does not feel like she is sick.  She states she was here 2 days ago with an allergic reaction was given steroids.  I did discuss the case including labs and imaging with the ER physician.    Review of Systems  Review of Systems   Constitutional: Negative for chills, fever and malaise/fatigue.   HENT: Negative for congestion.    Respiratory: Positive for cough and shortness of breath. Negative for sputum production and stridor.    Cardiovascular: Positive for chest pain. Negative for palpitations and leg swelling.   Gastrointestinal: Positive for nausea and vomiting. Negative for abdominal pain, constipation and diarrhea.   Genitourinary: Negative for dysuria and urgency.   Musculoskeletal: Negative for falls and myalgias.   Neurological: Negative for dizziness, tingling, loss of consciousness, weakness and headaches.   Psychiatric/Behavioral: Negative for depression and suicidal ideas.   All other systems reviewed and are negative.      Past Medical History   has a past medical history of AAA (abdominal aortic aneurysm) (HCC), Asthma, Chronic pain, Congestive heart failure (HCC), Fibromyalgia, GERD (gastroesophageal reflux disease), Hypertension, Migraine, Osteoporosis, Renal disorder, and Urticaria.    Surgical History   has a past surgical history  "that includes appendectomy (1974); gastroscopy (N/A, 2/7/2019); hysterectomy, total abdominal (1995); gastric bypass laparoscopic (1999); abdominal exploration (2002); shoulder decompression arthroscopic (Left, 2/19/2019); clavicle distal excision (Left, 2/19/2019); and shoulder arthroscopy w/ bicipital tenodesis repair (Left, 2/19/2019).     Family History  family history includes Diabetes in her mother; Heart Attack in her brother; Heart Disease in her brother and mother; Heart Failure in her mother; Hypertension in her brother, father, and mother.     Social History   reports that she has quit smoking. She has never used smokeless tobacco. She reports current alcohol use. She reports that she does not use drugs.    Allergies  Allergies   Allergen Reactions   • Bactrim [Sulfamethoxazole W-Trimethoprim] Shortness of Breath   • Bee Venom Anaphylaxis   • Econazole Anaphylaxis   • Floxin [Ofloxacin] Anaphylaxis, Shortness of Breath and Swelling     Cause throat swelling and difficulty breathing   • Gadolinium Derivatives Hives and Swelling     Throat swelling   • Iodine Shortness of Breath and Anaphylaxis     Throat and tongue swelling with IV contrast   • Keflex Shortness of Breath   • Levaquin Shortness of Breath     anaphlyaxis   • Levofloxacin Shortness of Breath   • Morphine Anaphylaxis   • Naloxone Hives     \"hives, SOB\"   • Nitrofurantoin Shortness of Breath     ...and hives     • Norco [Apap-Fd&C Yellow #10 Al Tai-Hydrocodone] Shortness of Breath     ...and hives     • Oxycodone Shortness of Breath     ...and hives     • Penicillins Shortness of Breath     ...and hives     • Tape Contact Dermatitis     Blisters, clear tegaderm ok.. No steristrips.   • Ancef [Cefazolin] Hives   • Bextra [Valdecoxib] Rash     \"Skin burn and peeling.\"   • Flagyl [Kdc:Metronidazole+Tartrazine] Hives   • Linezolid Rash     Rash all over body   • Other Drug Hives     \"Enconazole nitrate.\" shortness of breath and hives   • Other " Misc Unspecified     Adhesive tape: blisters, skin peels off   • Buprenorphine Anaphylaxis   • Clindamycin      HIVES     • Tygacil [Tigecycline]      itching   • Zithromax [Azithromycin] Hives and Shortness of Breath     Pt had Hives also       Medications  Prior to Admission Medications   Prescriptions Last Dose Informant Patient Reported? Taking?   AIMOVIG 70 MG/ML Solution Auto-injector  Patient Yes No   Si mL by Injection route Q30 DAYS.   Biotin 5000 MCG Cap  Patient Yes No   Sig: Take 1 Cap by mouth every day.   Cholecalciferol (VITAMIN D) 2000 units Cap  Patient Yes No   Sig: Take 1 Cap by mouth every day.   Coral Calcium 1000 (390 Ca) MG Tab  Patient Yes No   Sig: Take 1 Tab by mouth every day.   Cyanocobalamin (B-12) 1000 MCG/ML Kit   Yes No   Sig: by Injection route every 7 days.   DULoxetine (CYMBALTA) 60 MG Cap DR Particles delayed-release capsule  Patient No No   Sig: Take 1 Cap by mouth every day.   EPINEPHrine (EPIPEN) 0.3 MG/0.3ML Solution Auto-injector solution for injection   Yes No   FARXIGA 5 MG Tab   Yes No   Frovatriptan Succinate 2.5 MG Tab   No No   Sig: TAKE 1 TABLET BY MOUTH EVERY DAY AS NEEDED   Menaquinone-7 (VITAMIN K2) 100 MCG Cap  Patient Yes No   Sig: Take 1 Tab by mouth every day.   Probiotic Product (PROBIOTIC PO)  Patient Yes No   Sig: Take 1 Tab by mouth every day.   SYNTHROID 75 MCG Tab  Patient Yes No   Sig: Take 75 mcg by mouth Every morning on an empty stomach.   albuterol 108 (90 Base) MCG/ACT Aero Soln inhalation aerosol   Yes No   Sig: Inhale 2 Puffs by mouth every 6 hours as needed for Shortness of Breath.   calcitonin, salmon, (MIACALCIN) 200 UNIT/ACT Solution   Yes No   cetirizine (KLS ALLER-HAWA) 10 MG Tab   Yes No   Sig: Take 40 mg by mouth every day.   cevimeline (EVOXAC) 30 MG capsule   No No   Sig: Take 1 Cap by mouth 3 times a day.   clotrimazole (LOTRIMIN) 1 % Solution   Yes No   Sig: Apply  to affected area(s) 2 times a day. ankle   colesevelam (WELCHOL) 625  MG Tab   Yes No   Sig: Take  by mouth every day.   estradiol (ESTRACE) 0.5 MG tablet  Patient Yes No   Sig: Take 0.5 mg by mouth every day.   furosemide (LASIX) 20 MG Tab   No No   Sig: TAKE 0.5 TABS BY MOUTH 2 TIMES A DAY.   gabapentin (NEURONTIN) 400 MG Cap  Patient No No   Sig: Take 1 Cap by mouth 3 times a day.   hydrOXYzine Pamoate (VISTARIL PO) 11/3/2019 at Unknown time  Yes Yes   Sig: Take  by mouth.   liothyronine (CYTOMEL) 25 MCG Tab   Yes No   Sig: Take 25 mcg by mouth every evening.   losartan (COZAAR) 25 MG Tab   No No   Sig: TAKE 1 TABLET BY MOUTH EVERY DAY   magnesium oxide (MAG-OX) 400 MG Tab  Patient Yes No   Sig: Take 400 mg by mouth every evening.   meloxicam (MOBIC) 15 MG tablet   Yes No   Sig: Take 15 mg by mouth every day.   montelukast (SINGULAIR) 10 MG Tab  Patient No No   Sig: TAKE 1 TABLET BY MOUTH EVERY DAY   multivitamin (THERAGRAN) Tab  Patient Yes No   Sig: Take 1 Tab by mouth every day.   pantoprazole (PROTONIX) 40 MG Tablet Delayed Response  Patient Yes No   Sig: take 1 tablet by mouth twice daily   potassium chloride SA (KDUR) 20 MEQ Tab CR  Patient No No   Sig: Take 1 Tab by mouth every day.   predniSONE (DELTASONE) 20 MG Tab 11/4/2019 at Unknown time  Yes Yes   Sig: Take 20 mg by mouth every day.   raNITidine (ZANTAC) 150 MG Tab  Patient Yes No   Sig: Take 300 mg by mouth 2 times a day.   tizanidine (ZANAFLEX) 4 MG Tab   No No   Sig: TAKE 1 TABLET BY MOUTH EVERY 6 HOURS AS NEEDED      Facility-Administered Medications: None       Physical Exam  Temp:  [35.6 °C (96 °F)] 35.6 °C (96 °F)  Pulse:  [] 91  Resp:  [15-26] 26  BP: (109-190)/() 109/78  SpO2:  [93 %-96 %] 93 %    Physical Exam  Vitals signs and nursing note reviewed.   Constitutional:       General: She is not in acute distress.     Appearance: She is well-developed. She is not diaphoretic.   HENT:      Head: Normocephalic and atraumatic.      Right Ear: External ear normal.      Left Ear: External ear normal.       Nose: Nose normal. No congestion or rhinorrhea.      Mouth/Throat:      Mouth: Mucous membranes are moist.      Pharynx: No oropharyngeal exudate or posterior oropharyngeal erythema.   Eyes:      General: No scleral icterus.        Right eye: No discharge.         Left eye: No discharge.      Extraocular Movements: Extraocular movements intact.      Pupils: Pupils are equal, round, and reactive to light.   Neck:      Musculoskeletal: Normal range of motion and neck supple. No neck rigidity.      Trachea: No tracheal deviation.   Cardiovascular:      Rate and Rhythm: Normal rate and regular rhythm.      Heart sounds: No murmur. No friction rub. No gallop.    Pulmonary:      Effort: Pulmonary effort is normal. No respiratory distress.      Breath sounds: No stridor. Rales present. No wheezing.   Chest:      Chest wall: No tenderness.   Abdominal:      General: Bowel sounds are normal. There is no distension.      Palpations: Abdomen is soft.      Tenderness: There is no tenderness. There is no guarding or rebound.   Musculoskeletal: Normal range of motion.         General: No tenderness.   Lymphadenopathy:      Cervical: No cervical adenopathy.   Skin:     General: Skin is warm and dry.      Findings: No erythema or rash.   Neurological:      Mental Status: She is alert and oriented to person, place, and time.      Cranial Nerves: No cranial nerve deficit.   Psychiatric:         Behavior: Behavior normal.         Thought Content: Thought content normal.         Judgment: Judgment normal.         Laboratory:  Recent Labs     11/03/19 0235 11/04/19 2244   WBC 3.6* 12.6*   RBC 5.00 5.06   HEMOGLOBIN 14.0 14.2   HEMATOCRIT 45.2 44.2   MCV 90.4 87.4   MCH 28.0 28.1   MCHC 31.0* 32.1*   RDW 46.9 45.1   PLATELETCT 241 321   MPV 10.8 10.2     Recent Labs     11/03/19  0235 11/04/19  1001 11/04/19  2244   SODIUM 143 138 137   POTASSIUM 3.6 4.0 3.7   CHLORIDE 105 105 103   CO2 22 22 17*   GLUCOSE 103* 125* 136*   BUN 14 16  15   CREATININE 1.07 0.92 0.84   CALCIUM 9.6 9.4 9.4     Recent Labs     11/03/19  0235 11/04/19  1001 11/04/19  2244   ALTSGPT  --  29 29   ASTSGOT  --  26 29   ALKPHOSPHAT  --  84 92   TBILIRUBIN  --  0.4 0.3   LIPASE  --   --  45   GLUCOSE 103* 125* 136*     Recent Labs     11/04/19 2244   APTT 25.5   INR 0.88     Recent Labs     11/04/19 2244   NTPROBNP 1575*         Recent Labs     11/04/19 2244   TROPONINT 20*       Urinalysis:    No results found     Imaging:  CT-CTA COMPLETE THORACOABDOMINAL AORTA   Final Result         1.  Normal aorta without visualized aneurysm or dissection.   2.  Groundglass pulmonary opacities suggests edema or atypical infiltrate.   3.  Diverticulosis            DX-CHEST-PORTABLE (1 VIEW)   Final Result         1.  No acute cardiopulmonary disease.      EC-ECHOCARDIOGRAM LTD W/O CONT    (Results Pending)         Assessment/Plan:  I anticipate this patient will require at least two midnights for appropriate medical management, necessitating inpatient admission.    * Chest pain  Assessment & Plan  -Patient has had a stress test this year, no need to repeat  -I will repeat an echocardiogram  -Trend troponin  -Monitor patient on telemetry  -Symptomatic care    Pulmonary edema  Assessment & Plan  -I did personally review her CTA of the chest, noted bibasilar pulmonary edema  -Most recent echocardiogram had a preserved ejection fraction, will repeat due to new fluid overload causing hypoxia  -Trend troponin  -Start IV Lasix  -Medical management  -Obtain procalcitonin to rule out pneumonia however she does not feel sick    Acute respiratory failure with hypoxia (HCC)- (present on admission)  Assessment & Plan  -Patient was noted to have significant hypoxia, oxygen saturation was mid 80s  -She is now requiring 4 L of oxygen  -Due to pulmonary edema  -Start IV Lasix  -Start respiratory care per protocol    Hypothyroidism  Assessment & Plan  -Continue home Cytomel at 25 mcg daily, continue  home Synthroid at 75 mcg daily  -On admission TSH was normal at 1.24    High anion gap metabolic acidosis  Assessment & Plan  -Unknown cause, obtain lactic acid  -Repeat BMP in the morning    Leukocytosis  Assessment & Plan  -Patient was recently discharged with oral steroids causing leukocytosis    Gastroesophageal reflux disease without esophagitis  Assessment & Plan  -Continue home PPI and H2 blocker  -Start GI cocktail    Essential hypertension- (present on admission)  Assessment & Plan  -Start Coreg, continue home losartan  -Start Nitro-Bid  -Start PRN enalapril and clonidine  -Adjust as needed      VTE prophylaxis: Lovenox

## 2019-11-05 NOTE — PROGRESS NOTES
Notified MD Chin regarding critical Lactic Acid result of 5.4; Repeat lab draw ordered; No additional new orders given at this time;

## 2019-11-05 NOTE — FLOWSHEET NOTE
11/05/19 0334   Type of Assessment   Assessment Yes   Patient History   Pulmonary Diagnosis none   Surgical Procedures none   Home O2 No   Home Treatments/Frequency No   COPD Risk Screening   Do you have a history of COPD? No   COPD Population Screener   During the past 4 weeks, how much did you feel short of breath? 0   Do you ever cough up any mucus or phlegm? 0   In the past 12 months, you do less than you used to because of your breathing problems 0   Have you smoked at least 100 cigarettes in your entire life? 0   How old are you? 1   COPD Screening Score 1   Smoking History   Have you ever smoked Never   Level Of Consciousness   Level of Consciousness Alert   Chest Exam   Respiration 18   Pulse 92   Oximetry   #Pulse Oximetry (Single Determination) Yes   Oxygen   Home O2 Use Prior To Admission? No   Pulse Oximetry 96 %   O2 (LPM) 4   O2 Daily Delivery Respiratory  Nasal Cannula

## 2019-11-06 DIAGNOSIS — G43.709 CHRONIC MIGRAINE W/O AURA W/O STATUS MIGRAINOSUS, NOT INTRACTABLE: ICD-10-CM

## 2019-11-06 LAB
ANION GAP SERPL CALC-SCNC: 11 MMOL/L (ref 0–11.9)
BUN SERPL-MCNC: 22 MG/DL (ref 8–22)
CALCIUM 24H UR-MCNC: 10.5 MG/DL
CALCIUM 24H UR-MRATE: NORMAL MG/D
CALCIUM SERPL-MCNC: 9.2 MG/DL (ref 8.4–10.2)
CALCIUM/CREAT 24H UR: 228 MG/G (ref 20–300)
CHLORIDE SERPL-SCNC: 96 MMOL/L (ref 96–112)
CO2 SERPL-SCNC: 25 MMOL/L (ref 20–33)
COLLECT DURATION TIME SPEC: NORMAL HRS
COLLECT DURATION TIME SPEC: NORMAL HRS
CREAT 24H UR-MCNC: 46 MG/DL
CREAT 24H UR-MRATE: NORMAL MG/D (ref 500–1400)
CREAT SERPL-MCNC: 1.17 MG/DL (ref 0.5–1.4)
ERYTHROCYTE [DISTWIDTH] IN BLOOD BY AUTOMATED COUNT: 46 FL (ref 35.9–50)
GLUCOSE SERPL-MCNC: 145 MG/DL (ref 65–99)
HCT VFR BLD AUTO: 36.7 % (ref 37–47)
HGB BLD-MCNC: 11.7 G/DL (ref 12–16)
LACTATE BLD-SCNC: 2.6 MMOL/L (ref 0.5–2)
MCH RBC QN AUTO: 28.6 PG (ref 27–33)
MCHC RBC AUTO-ENTMCNC: 31.9 G/DL (ref 33.6–35)
MCV RBC AUTO: 89.7 FL (ref 81.4–97.8)
PHOSPHATE 24H UR-MCNC: 34 MG/DL
PHOSPHATE 24H UR-MRATE: NORMAL MG/D (ref 400–1300)
PHOSPHATE/CREAT 24H UR: 739 MG/G
PLATELET # BLD AUTO: 235 K/UL (ref 164–446)
PMV BLD AUTO: 10.3 FL (ref 9–12.9)
POTASSIUM SERPL-SCNC: 5.1 MMOL/L (ref 3.6–5.5)
RBC # BLD AUTO: 4.09 M/UL (ref 4.2–5.4)
SODIUM SERPL-SCNC: 132 MMOL/L (ref 135–145)
SPECIMEN VOL ?TM UR: NORMAL ML
TOTAL VOLUME 1105: NORMAL ML
WBC # BLD AUTO: 11 K/UL (ref 4.8–10.8)

## 2019-11-06 PROCEDURE — 99233 SBSQ HOSP IP/OBS HIGH 50: CPT | Performed by: HOSPITALIST

## 2019-11-06 PROCEDURE — A9270 NON-COVERED ITEM OR SERVICE: HCPCS | Performed by: HOSPITALIST

## 2019-11-06 PROCEDURE — 700102 HCHG RX REV CODE 250 W/ 637 OVERRIDE(OP): Performed by: INTERNAL MEDICINE

## 2019-11-06 PROCEDURE — 700102 HCHG RX REV CODE 250 W/ 637 OVERRIDE(OP): Performed by: HOSPITALIST

## 2019-11-06 PROCEDURE — 700111 HCHG RX REV CODE 636 W/ 250 OVERRIDE (IP): Performed by: INTERNAL MEDICINE

## 2019-11-06 PROCEDURE — 83605 ASSAY OF LACTIC ACID: CPT

## 2019-11-06 PROCEDURE — 85027 COMPLETE CBC AUTOMATED: CPT

## 2019-11-06 PROCEDURE — A9270 NON-COVERED ITEM OR SERVICE: HCPCS | Performed by: INTERNAL MEDICINE

## 2019-11-06 PROCEDURE — 80048 BASIC METABOLIC PNL TOTAL CA: CPT

## 2019-11-06 PROCEDURE — 770020 HCHG ROOM/CARE - TELE (206)

## 2019-11-06 RX ORDER — CARVEDILOL 6.25 MG/1
12.5 TABLET ORAL 2 TIMES DAILY WITH MEALS
Status: DISCONTINUED | OUTPATIENT
Start: 2019-11-06 | End: 2019-11-07 | Stop reason: HOSPADM

## 2019-11-06 RX ADMIN — CEVIMELINE HYDROCHLORIDE 30 MG: 30 CAPSULE ORAL at 18:00

## 2019-11-06 RX ADMIN — CEVIMELINE HYDROCHLORIDE 30 MG: 30 CAPSULE ORAL at 06:00

## 2019-11-06 RX ADMIN — LEVOTHYROXINE SODIUM 75 MCG: 50 TABLET ORAL at 06:09

## 2019-11-06 RX ADMIN — FUROSEMIDE 40 MG: 10 INJECTION, SOLUTION INTRAMUSCULAR; INTRAVENOUS at 18:36

## 2019-11-06 RX ADMIN — CEVIMELINE HYDROCHLORIDE 30 MG: 30 CAPSULE ORAL at 12:00

## 2019-11-06 RX ADMIN — GABAPENTIN 400 MG: 400 CAPSULE ORAL at 20:27

## 2019-11-06 RX ADMIN — SPIRONOLACTONE 25 MG: 25 TABLET ORAL at 06:08

## 2019-11-06 RX ADMIN — GABAPENTIN 400 MG: 400 CAPSULE ORAL at 06:07

## 2019-11-06 RX ADMIN — DULOXETINE HYDROCHLORIDE 60 MG: 30 CAPSULE, DELAYED RELEASE ORAL at 06:08

## 2019-11-06 RX ADMIN — ENOXAPARIN SODIUM 40 MG: 100 INJECTION SUBCUTANEOUS at 06:07

## 2019-11-06 RX ADMIN — LOSARTAN POTASSIUM 25 MG: 25 TABLET ORAL at 06:08

## 2019-11-06 RX ADMIN — CARVEDILOL 12.5 MG: 6.25 TABLET, FILM COATED ORAL at 18:36

## 2019-11-06 RX ADMIN — LIOTHYRONINE SODIUM 25 MCG: 5 TABLET ORAL at 10:11

## 2019-11-06 RX ADMIN — CARVEDILOL 6.25 MG: 6.25 TABLET, FILM COATED ORAL at 07:55

## 2019-11-06 RX ADMIN — OMEPRAZOLE 20 MG: 20 CAPSULE, DELAYED RELEASE ORAL at 06:07

## 2019-11-06 RX ADMIN — FUROSEMIDE 40 MG: 10 INJECTION, SOLUTION INTRAMUSCULAR; INTRAVENOUS at 06:07

## 2019-11-06 RX ADMIN — POTASSIUM CHLORIDE 40 MEQ: 20 TABLET, EXTENDED RELEASE ORAL at 06:10

## 2019-11-06 RX ADMIN — GABAPENTIN 400 MG: 400 CAPSULE ORAL at 13:03

## 2019-11-06 RX ADMIN — ESTRADIOL 0.5 MG: 1 TABLET ORAL at 06:00

## 2019-11-06 RX ADMIN — NITROGLYCERIN 0.5 INCH: 20 OINTMENT TOPICAL at 06:04

## 2019-11-06 RX ADMIN — POTASSIUM CHLORIDE 40 MEQ: 20 TABLET, EXTENDED RELEASE ORAL at 18:36

## 2019-11-06 RX ADMIN — MAGNESIUM OXIDE TAB 400 MG (241.3 MG ELEMENTAL MG) 400 MG: 400 (241.3 MG) TAB at 18:36

## 2019-11-06 RX ADMIN — PREDNISONE 20 MG: 10 TABLET ORAL at 06:07

## 2019-11-06 ASSESSMENT — ENCOUNTER SYMPTOMS
DOUBLE VISION: 0
WHEEZING: 1
DIAPHORESIS: 1
DEPRESSION: 0
LOSS OF CONSCIOUSNESS: 0
PALPITATIONS: 0
STRIDOR: 0
HEMOPTYSIS: 0
NAUSEA: 0
BACK PAIN: 0
WEAKNESS: 0
TREMORS: 0
PND: 0
ABDOMINAL PAIN: 0
ORTHOPNEA: 0
VOMITING: 0
ORTHOPNEA: 1
CHILLS: 0
SENSORY CHANGE: 0
NERVOUS/ANXIOUS: 0
NAUSEA: 1
SINUS PAIN: 1
MYALGIAS: 0
EYE REDNESS: 0
MEMORY LOSS: 0
HEADACHES: 1
SPUTUM PRODUCTION: 0
COUGH: 1
CONSTIPATION: 0
INSOMNIA: 0
TINGLING: 0
SHORTNESS OF BREATH: 0
HEADACHES: 0
EYE DISCHARGE: 0
BLOOD IN STOOL: 0
NECK PAIN: 0
CLAUDICATION: 0
DIZZINESS: 0
FEVER: 0
WEIGHT LOSS: 0
EYE PAIN: 0
SEIZURES: 0
FOCAL WEAKNESS: 0
SPEECH CHANGE: 0
HEARTBURN: 0
WHEEZING: 0
PHOTOPHOBIA: 0
SORE THROAT: 0
VOMITING: 1
SINUS PAIN: 0
BLURRED VISION: 0
DIARRHEA: 0
COUGH: 0

## 2019-11-06 NOTE — PROGRESS NOTES
[facility] Medicine Daily Progress Note    Date of Service  11/6/2019    Chief Complaint  54 y.o. female admitted 11/4/2019 with chest pain.    Hospital Course    per HPI      Interval Problem Update  Patient today reports No chest pain, reports feeling much better. Denies shortness of breath. Her blood pressure has been borderline nevertheless and she is still hypoxic, needing oxygen.    Consultants/Specialty  Cardiology     Code Status  Full Code    Disposition  TBD    Review of Systems  Review of Systems   Constitutional: Negative for chills, fever and malaise/fatigue.   HENT: Negative for congestion, hearing loss, sore throat and tinnitus.    Eyes: Negative for blurred vision, double vision, photophobia and pain.   Respiratory: Negative for cough, hemoptysis, sputum production, shortness of breath and stridor.    Cardiovascular: Negative for chest pain, palpitations, orthopnea, claudication and PND.   Gastrointestinal: Negative for blood in stool, constipation, heartburn, melena, nausea and vomiting.   Genitourinary: Negative for dysuria, frequency and urgency.   Musculoskeletal: Negative for back pain, myalgias and neck pain.   Neurological: Negative for dizziness, tingling, tremors, sensory change, speech change, weakness and headaches.   Psychiatric/Behavioral: Negative for depression, memory loss and suicidal ideas. The patient is not nervous/anxious.    All other systems reviewed and are negative.       Physical Exam  Temp:  [36.3 °C (97.3 °F)-36.7 °C (98 °F)] 36.7 °C (98 °F)  Pulse:  [63-94] 68  Resp:  [18-20] 18  BP: ()/(40-97) 98/55  SpO2:  [90 %-100 %] 92 %    Physical Exam  Vitals signs and nursing note reviewed.   Constitutional:       General: She is not in acute distress.     Appearance: Normal appearance. She is not ill-appearing.   HENT:      Head: Normocephalic and atraumatic.      Nose: No congestion.      Mouth/Throat:      Mouth: Mucous membranes are moist.      Pharynx: No  oropharyngeal exudate or posterior oropharyngeal erythema.   Eyes:      Extraocular Movements: Extraocular movements intact.      Conjunctiva/sclera: Conjunctivae normal.      Pupils: Pupils are equal, round, and reactive to light.   Neck:      Musculoskeletal: Normal range of motion and neck supple. No neck rigidity.   Cardiovascular:      Rate and Rhythm: Normal rate and regular rhythm.      Pulses: Normal pulses.      Heart sounds: No murmur.   Pulmonary:      Effort: Pulmonary effort is normal. No respiratory distress.      Breath sounds: Normal breath sounds. No wheezing or rales (mild bibasilar crackles).   Abdominal:      General: Abdomen is flat. Bowel sounds are normal. There is no distension.      Palpations: Abdomen is soft.      Tenderness: There is no tenderness. There is no right CVA tenderness or guarding.   Musculoskeletal: Normal range of motion.         General: No swelling or tenderness.   Skin:     General: Skin is warm and dry.      Capillary Refill: Capillary refill takes less than 2 seconds.   Neurological:      General: No focal deficit present.      Mental Status: She is alert and oriented to person, place, and time. Mental status is at baseline.      Cranial Nerves: No cranial nerve deficit.   Psychiatric:         Mood and Affect: Mood normal.         Thought Content: Thought content normal.         Fluids    Intake/Output Summary (Last 24 hours) at 11/6/2019 1521  Last data filed at 11/6/2019 0830  Gross per 24 hour   Intake 240 ml   Output --   Net 240 ml       Laboratory  Recent Labs     11/04/19 2244 11/06/19 0119   WBC 12.6* 11.0*   RBC 5.06 4.09*   HEMOGLOBIN 14.2 11.7*   HEMATOCRIT 44.2 36.7*   MCV 87.4 89.7   MCH 28.1 28.6   MCHC 32.1* 31.9*   RDW 45.1 46.0   PLATELETCT 321 235   MPV 10.2 10.3     Recent Labs     11/04/19 2244 11/05/19  1418 11/06/19  0119   SODIUM 137 135 132*   POTASSIUM 3.7 4.2 5.1   CHLORIDE 103 97 96   CO2 17* 22 25   GLUCOSE 136* 108* 145*   BUN 15 21 22    CREATININE 0.84 1.10 1.17   CALCIUM 9.4 9.0 9.2     Recent Labs     11/04/19  2244   APTT 25.5   INR 0.88               Imaging  EC-ECHOCARDIOGRAM COMPLETE W/ CONT   Final Result      CT-CTA COMPLETE THORACOABDOMINAL AORTA   Final Result         1.  Normal aorta without visualized aneurysm or dissection.   2.  Groundglass pulmonary opacities suggests edema or atypical infiltrate.   3.  Diverticulosis            DX-CHEST-PORTABLE (1 VIEW)   Final Result         1.  No acute cardiopulmonary disease.           Assessment/Plan  * Chest pain  Assessment & Plan  Concern for NSTEMI initially with EKG changes and elevated troponin's  Cardiology was consulted, thinks this is 2/2 to uncontrolled hypertension.  Resolved at this time.    Acute respiratory failure with hypoxia (HCC)- (present on admission)  Assessment & Plan  2/2 to acute systolic heart failure.  Was hypoxic needing 4L of oxygen.  IV lasix is continued, Strict I/Os  Improving overall, still needing 1L oxygen.   Continue RT protocol, duo nebs, Pep therapy if warranted, and incentive spirometry.     Hypothyroidism  Assessment & Plan  Resume home dose of Cytomel, synthroid.  TSH WNL    High anion gap metabolic acidosis  Assessment & Plan  Resolved  CTM    Leukocytosis  Assessment & Plan  Improving, possibly 2/2 to recent steroids use.    Gastroesophageal reflux disease without esophagitis  Assessment & Plan  Having occasional reflux symptoms.  Will increase omeprazole to BID as she was on before.    Hypertensive emergency- (present on admission)  Assessment & Plan  Controlled now  Resume Coreg,Losartan, aldactone,     Acute systolic heart failure  (HCC)- (present on admission)  Assessment & Plan  Echocardiogram shows LVEF 20%  2/2 to uncontrolled hypertension?  Cardiology recommended HTN management and outpatient follow up.      The total time spent face to face with this patient was about 35 mins of which 60% of time was spent on counseling, review of records  including pertinent lab data and studies, as well as discussing diagnostic evaluation and work up, planned therapeutic interventions and future disposition of care. Where indicated, the assessment and plan reflect discussion of patient with consultants, other healthcare providers, family members, and additional research needed to obtain further information in formulating the plan of care of this patient.       VTE prophylaxis: Lovenox
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
## 2019-11-06 NOTE — CARE PLAN
Problem: Bowel/Gastric:  Goal: Will not experience complications related to bowel motility  Outcome: PROGRESSING AS EXPECTED   Pt having regular bowel movements, refusing senna     Problem: Pain Management  Goal: Pain level will decrease to patient's comfort goal  Outcome: PROGRESSING AS EXPECTED   Pt was admitted with chest pain, pt reports no chest pain this am.

## 2019-11-06 NOTE — NON-PROVIDER
"          11/6/2019  CC: Chest pain onset 2 days ago     HPI:   CHRISTIE is a 54 year old patient who presented to the ER with chief complaint of chest pain 2 days ago. She has a history of Congestive Heart Failure and an abdominal aortic aneurysm which is being monitored for any size changes from 4.5 cm in diameter. She states she was playing slots with her friend when she started getting the chest pain and due to her previous 2 episodes of chest pain about 3 years ago she decided to come to the ER right away. The chest pain occurred at rest with no associated shortness of breath or productive cough. She states when the episode of chest pain occurred 2 days ago, it felt like there was a \"semi-truck\" on her chest. The pain is mostly located on the substernal region without radiation. She denies jaw pain but states she has a history of TMJ so she couldn't really tell if she was experiencing any pain in the jaw. She reports chest pain was constant and rates it as a 9/10 2 days ago but currently is at 0/10 in severity. She states second dose of Fentanyl given at the ER provided relief from the pain. She denies any exacerbating factors at this time.   She also reports sleeping with 3 pillows because \"water keeps going up from the stomach\".     She reports a history of chest pain in the past. Her first episode of chest pain happened 3 years ago and was told it was a stress heart attack. Stress test result was normal at that time. The second episode happened 6 months after the stress heart attack during which time the stitches from her hiatal surgery came off.    She was at the hospital on 11/03/2019 for evaluation of potential allergic reaction from paricalcitol that she was taking for hyperparathyroidism. She was discharged with a diagnosis of allergic urticaria and allergic reaction to drug in a stable condition. She does not seem to have any complaints regarding this visit at this time and states her symptoms have " resolved.     Review of Systems   Constitutional: Positive for diaphoresis (2 days ago when the chest pain began). Negative for chills, fever and weight loss.   HENT: Positive for ear pain. Negative for congestion, ear discharge, hearing loss, nosebleeds, sinus pain, sore throat and tinnitus.         Oral painful sores onset 1 day ago. Eating exacerbates the pain and patient rates it as an 8/10 but drinking water provides relief.     Eyes: Negative for blurred vision, discharge and redness.   Respiratory: Positive for cough (non-productive cough since December 2018 due to chronic sinusitis). Negative for hemoptysis, sputum production, shortness of breath, wheezing and stridor.    Cardiovascular: Positive for orthopnea. Negative for chest pain, palpitations and leg swelling.   Gastrointestinal: Positive for vomiting (2 days ago). Negative for constipation, diarrhea and nausea.   Genitourinary: Negative for frequency and urgency.   Skin: Positive for itching (Right lower leg ) and rash (Rash noted on the right lower leg ).   Neurological: Positive for headaches (has a history of migraines but currently the headache is located on the posterior aspect of the head). Negative for dizziness, tingling, tremors and speech change.   Endo/Heme/Allergies:        Conjunctiva is pale upon examination     Psychiatric/Behavioral: Negative for depression. The patient is not nervous/anxious and does not have insomnia.      Past Medical History:   Diagnosis Date   • AAA (abdominal aortic aneurysm) ([facility])    • Asthma    • Chronic pain    • Congestive heart failure ([facility])     CardiologistDr. Carlson   • Fibromyalgia    • GERD (gastroesophageal reflux disease)    • Hypertension    • Migraine    • Osteoporosis    • Renal disorder     CKD   • Urticaria        PMH:   Past Medical History:   Diagnosis Date   • AAA (abdominal aortic aneurysm) ([facility])    • Asthma    • Chronic pain    • Congestive heart failure ([facility])     [facility]   • Fibromyalgia    • GERD (gastroesophageal reflux disease)    • Hypertension    • Migraine    • Osteoporosis    • Renal disorder     CKD   • Urticaria      Past Surgical History:   Procedure Laterality Date   • SHOULDER DECOMPRESSION ARTHROSCOPIC Left 2/19/2019    Procedure: SHOULDER DECOMPRESSION ARTHROSCOPIC - SUBACROMIAL;  Surgeon: Camila Elkins M.D.;  Location: SURGERY [facility];  Service: Orthopedics   • CLAVICLE DISTAL EXCISION Left 2/19/2019    Procedure: CLAVICLE DISTAL EXCISION;  Surgeon: Camila Elkins M.D.;  Location: SURGERY [facility];  Service: Orthopedics   • SHOULDER ARTHROSCOPY W/ BICIPITAL TENODESIS REPAIR Left 2/19/2019    Procedure: SHOULDER ARTHROSCOPY W/ BICIPITAL TENODESIS REPAIR;  Surgeon: Camila Elkins M.D.;  Location: [facility];  Service: Orthopedics   • GASTROSCOPY N/A 2/7/2019    Procedure: GASTROSCOPY- DIALATION AND BIOPSY;  Surgeon: Hola Veliz M.D.;  Location: [facility];  Service: Gastroenterology   • ABDOMINAL EXPLORATION  2002   • GASTRIC BYPASS LAPAROSCOPIC  1999   • HYSTERECTOMY, TOTAL ABDOMINAL  1995   • APPENDECTOMY  1974       Current Facility-Administered Medications:   •  carvedilol (COREG) tablet 12.5 mg, 12.5 mg, Oral, BID WITH MEALS, Argenis Garcia M.D.  •  cevimeline (EVOXAC) CAPS 30 mg, 30 mg, Oral, TID, Alber Chin D.O., 30 mg at 11/06/19 0600  •  DULoxetine (CYMBALTA) capsule 60 mg, 60 mg, Oral, DAILY, Alber Chin D.O., 60 mg at 11/06/19 0608  •  estradiol (ESTRACE) tablet 0.5 mg, 0.5 mg, Oral, DAILY, Alber Chin D.O., 0.5 mg at 11/06/19 0600  •  gabapentin (NEURONTIN) capsule 400 mg, 400 mg, Oral, TID, Alber Chin D.O., 400 mg at 11/06/19 0607  •  liothyronine (CYTOMEL) tablet 25 mcg, 25 mcg, Oral, DAILY, Alber Chin D.O., 25 mcg at 11/06/19 1011  •  losartan (COZAAR) tablet 25 mg, 25 mg, Oral, QDAY, Alber Chin D.O., 25 mg at 11/06/19 0608  •  magnesium  oxide (MAG-OX) tablet 400 mg, 400 mg, Oral, Q EVENING, JM OrtizO., 400 mg at 11/05/19 1700  •  predniSONE (DELTASONE) tablet 20 mg, 20 mg, Oral, DAILY, JM OrtizO., 20 mg at 11/06/19 0607  •  levothyroxine (SYNTHROID) tablet 75 mcg, 75 mcg, Oral, AM ES, JM OrtizO., 75 mcg at 11/06/19 0609  •  tizanidine (ZANAFLEX) tablet 4 mg, 4 mg, Oral, Q6HRS PRN, JM OrtizO.  •  senna-docusate (PERICOLACE or SENOKOT S) 8.6-50 MG per tablet 2 Tab, 2 Tab, Oral, BID **AND** polyethylene glycol/lytes (MIRALAX) PACKET 1 Packet, 1 Packet, Oral, QDAY PRN **AND** magnesium hydroxide (MILK OF MAGNESIA) suspension 30 mL, 30 mL, Oral, QDAY PRN **AND** bisacodyl (DULCOLAX) suppository 10 mg, 10 mg, Rectal, QDAY PRN, JM OrtizO.  •  hydrOXYzine (VISTARIL) 25 MG/ML injection 25 mg, 25 mg, Intramuscular, Q6HRS PRN, ANIL Ortiz.O., 25 mg at 11/05/19 2018  •  Respiratory Care per Protocol, , Nebulization, Continuous RT, Alber Chin D.O.  •  enoxaparin (LOVENOX) inj 40 mg, 40 mg, Subcutaneous, DAILY, JM OrtizO., 40 mg at 11/06/19 0607  •  acetaminophen (TYLENOL) tablet 650 mg, 650 mg, Oral, Q6HRS PRN, JM OrtizO.  •  enalaprilat (VASOTEC) injection 1.25 mg, 1.25 mg, Intravenous, Q6HRS PRN, ANIL Ortiz.O.  •  cloNIDine (CATAPRES) tablet 0.1 mg, 0.1 mg, Oral, Q6HRS PRN, JM OrtizO.  •  ondansetron (ZOFRAN) syringe/vial injection 4 mg, 4 mg, Intravenous, Q4HRS PRN, JM OrtizO.  •  ondansetron (ZOFRAN ODT) dispertab 4 mg, 4 mg, Oral, Q4HRS PRN, JM OrtizO.  •  promethazine (PHENERGAN) tablet 12.5-25 mg, 12.5-25 mg, Oral, Q4HRS PRN, JM OrtizO.  •  promethazine (PHENERGAN) suppository 12.5-25 mg, 12.5-25 mg, Rectal, Q4HRS PRN, ANIL Ortiz.O.  •  prochlorperazine (COMPAZINE) injection 5-10 mg, 5-10 mg, Intravenous, Q4HRS PRN, ANIL Ortiz.O.  •  furosemide (LASIX) injection 40 mg, 40 mg, Intravenous, BID  "DIURETIC, Alebr Chin D.O., 40 mg at 11/06/19 0607  •  potassium chloride SA (Kdur) tablet 40 mEq, 40 mEq, Oral, BID, Alber Chin D.O., 40 mEq at 11/06/19 0610  •  HYDROmorphone pf (DILAUDID) injection 1 mg, 1 mg, Intravenous, Q3HRS PRN, Alber Chin D.O., 1 mg at 11/05/19 2019  •  LORazepam (ATIVAN) injection 0.5 mg, 0.5 mg, Intravenous, Q4HRS PRN, Alber Chin D.O.  •  nitroglycerin (NITRO-BID) 2 % ointment 0.5 Inch, 0.5 Inch, Topical, Q6HRS, Alber Chin D.O., 0.5 Inch at 11/06/19 0604  •  omeprazole (PRILOSEC) capsule 20 mg, 20 mg, Oral, DAILY, Alber Chin D.O., 20 mg at 11/06/19 0607  •  spironolactone (ALDACTONE) tablet 25 mg, 25 mg, Oral, Q DAY, Alejandra Wilson M.D., 25 mg at 11/06/19 0608  •  diphenhydrAMINE (BENADRYL) tablet/capsule 25 mg, 25 mg, Oral, Once, Argenis Garcia M.D., Stopped at 11/05/19 1745    Allergies   Allergen Reactions   • Bactrim [Sulfamethoxazole W-Trimethoprim] Shortness of Breath   • Bee Venom Anaphylaxis   • Econazole Anaphylaxis   • Floxin [Ofloxacin] Anaphylaxis, Shortness of Breath and Swelling     Cause throat swelling and difficulty breathing   • Gadolinium Derivatives Hives and Swelling     Throat swelling   • Iodine Shortness of Breath and Anaphylaxis     Throat and tongue swelling with IV contrast   • Keflex Shortness of Breath   • Levaquin Shortness of Breath     anaphlyaxis   • Levofloxacin Shortness of Breath   • Morphine Anaphylaxis   • Naloxone Hives     \"hives, SOB\"   • Nitrofurantoin Shortness of Breath     ...and hives     • Norco [Apap-Fd&C Yellow #10 Al Tai-Hydrocodone] Shortness of Breath     ...and hives     • Oxycodone Shortness of Breath     ...and hives     • Penicillins Shortness of Breath     ...and hives     • Tape Contact Dermatitis     Blisters, clear tegaderm ok.. No steristrips.   • Ancef [Cefazolin] Hives   • Bextra [Valdecoxib] Rash     \"Skin burn and peeling.\"   • Flagyl [Kdc:Metronidazole+Tartrazine] Hives   • " "Linezolid Rash     Rash all over body   • Other Drug Hives     \"Enconazole nitrate.\" shortness of breath and hives   • Other Misc Unspecified     Adhesive tape: blisters, skin peels off   • Buprenorphine Anaphylaxis   • Clindamycin      HIVES     • Tygacil [Tigecycline]      itching   • Zithromax [Azithromycin] Hives and Shortness of Breath     Pt had Hives also     Family History   Problem Relation Age of Onset   • Heart Disease Mother    • Diabetes Mother    • Heart Failure Mother    • Hypertension Mother    • Hypertension Father    • Heart Disease Brother    • Heart Attack Brother    • Hypertension Brother      Social history:   Patient reports she has never smoked. She reports current occasional alcohol use about once or twice a week. No illicit drug use. Patient states she used to walk her dog but has been unable to do so due to an ankle injury. She reports she does not work currently and is on disability.    Physical Exam   Constitutional: She is oriented to person, place, and time. No distress.   HENT:   Head: Normocephalic.   Eyes: Pupils are equal, round, and reactive to light. Conjunctivae and EOM are normal. No scleral icterus.   Cardiovascular: Normal rate, regular rhythm, normal heart sounds and intact distal pulses.   Pulmonary/Chest: Effort normal and breath sounds normal. No respiratory distress. She has no wheezes. She has no rales.   Musculoskeletal:         General: No tenderness, deformity or edema.   Neurological: She is alert and oriented to person, place, and time.   Skin: Rash noted. She is not diaphoretic.        Psychiatric: Mood, memory, affect and judgment normal.     Recent Labs     11/04/19 2244 11/06/19  0119   HEMOGLOBIN 14.2 11.7*   HEMATOCRIT 44.2 36.7*   MCV 87.4 89.7   MCH 28.1 28.6   PLATELETCT 321 235     Recent Labs     11/04/19 2244 11/06/19  0119   WBC 12.6* 11.0*   RBC 5.06 4.09*   HEMOGLOBIN 14.2 11.7*   HEMATOCRIT 44.2 36.7*   MCV 87.4 89.7   MCH 28.1 28.6   RDW 45.1 " 46.0   PLATELETCT 321 235   MPV 10.2 10.3   NEUTSPOLYS 69.90  --    LYMPHOCYTES 18.20*  --    MONOCYTES 11.10  --    EOSINOPHILS 0.20  --    BASOPHILS 0.20  --      .  Recent Labs     11/04/19  2244 11/05/19  1418 11/06/19  0119   SODIUM 137 135 132*   POTASSIUM 3.7 4.2 5.1   CHLORIDE 103 97 96   CO2 17* 22 25   GLUCOSE 136* 108* 145*   BUN 15 21 22     The 10-year ASCVD risk score (Conchita MARY JrJelani, et al., 2013) is: 1.6%    Values used to calculate the score:      Age: 54 years      Sex: Female      Is Non- : No      Diabetic: No      Tobacco smoker: No      Systolic Blood Pressure: 128 mmHg      Is BP treated: Yes      HDL Cholesterol: 92 mg/dL      Total Cholesterol: 193 mg/dL     .CT-CTA COMPLETE THORACOABDOMINAL AORTA  IMPRESSION:   1.  Normal aorta without visualized aneurysm or dissection.  2.  Groundglass pulmonary opacities suggests edema or atypical infiltrate.  3.  Diverticulosis  DX-CHEST-PORTABLE (1 VIEW)   IMPRESSION:   1.  No acute cardiopulmonary disease.    EC-ECHOCARDIOGRAM COMPLETE W/ CONT     Compared to the images of the study done 4/4/19, EF is severely reduced   with no wall motion abnormalities.  Contrast study, no LV thrombus.  Normal left ventricular chamber size.  Severely reduced left ventricular systolic function.  Left ventricular ejection fraction is visually estimated to be 20%.  The basal to apical anterior, septal and inferior segments are akinetic   to dyskinetic, normal LV function at the base and mid inferolateral and   lateral walls.  Grade II diastolic dysfunction.  Normal right ventricular size and systolic function.  Trace tricuspid regurgitation.  Unable to estimate RVSP or RAP.    EKG  Interpretive Statements   Sinus rhythm   Borderline prolonged DC interval   LVH with IVCD and secondary repol abnrm   Borderline ST elevation, inferior leads   Borderline prolonged QT interval   Compared to ECG 11/04/2019 23:44:28   Sinus tachycardia no longer present   ST  (T wave) deviation still present      Patient Active Problem List   Diagnosis   • Hypertensive emergency   • Acute respiratory failure with hypoxia ([facility])   • Gastroesophageal reflux disease without esophagitis   • Dry mouth in setting of trauma 2001   • Muscle spasm   • Fibromyalgia   • Multiple allergies   • Chronic migraine without aura, not intractable   • Aortic root dilatation ([facility])   • CKD (chronic kidney disease), stage III ([facility])   • Class 1 obesity in adult   • Hx of gastric bypass in 2009 Decmber    • Anemia, iron deficiency, inadequate dietary intake   • Advance directive on file   • Asthma   • 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   • Depressive disorder   • Esophageal stricture   • History of abnormal Pap smear   • History of hysterectomy for benign disease   • IBS (irritable bowel syndrome)   • Pain in joint of left shoulder   • Osteoarthritis of right hip   • Osteoporosis   • Recurrent bacterial infection   • Central perforation of tympanic membrane of right ear   • Closed fracture of distal end of right radius   • Closed fracture of right wrist   • Seizure ([facility])   • Shoulder impingement   • Takotsubo syndrome   • Need for vaccination   • Rash on lips   • Tear of left rotator cuff s/p repair   • CKD (chronic kidney disease) stage 3, GFR 30-59 ml/min ([facility])   • Chronic cough   • Chronic rhinitis   • Leukocytosis   • High anion gap metabolic acidosis   • Chest pain   • Hypothyroidism     Assessment and plan    1. Chest Pain due to possible CHF  * acute onset   * family history of heart disease   * Prior history of CHF and stress cardiomyopathy 3 years ago   *substernal pressure chest pain, constant and heavy in quality   * LV EF 20%   *LVH with IVCD (intraventricular conduction delay) and secondary repol abnrm  *Troponin level on 11/5/2019 was 93 at 0415 and at 9:00 pm  *Groundglass pulmonary opacities suggests edema or atypical infiltrate.    Plan:   * check Troponin level again   * repeat ECG  *  Echocardiogram   * symptomatic relief   * recheck BNP     2. STEMI   * substernal pressured chest pain   * acute onset at rest   *  Borderline ST elevation, inferior leads   * chest pain relieved with Fentanyl   * diaphoresis, vomiting     Plan:   1. Repeat ECG   2. Repeat cardiac ultrasound  3. Repeat CBC with differential   4. Repeat cardiac enzyme test for Troponin     3. Pneumonia  * prior history of recurrent pneumonia  * chronic cough from chronic sinusitis   * CT of thoracoabdominal aorta showed groundglass pulmonary opacities suggests edema or atypical infiltrate.  * Patient has GFR  Of 52 which could signify possible pneumonia       Plan:   1.Pro-calcitonin level check up to rule out possibility of pneumonia  2.  Check Troponin levels   3. If positive start treatment     4. Hyponatremia   * has progressively been decreasing since 11/04/19. It was 137 on 11/04, 134 on 11/05 and today it is at 132.   * Could be due to volume depletion post heart failure and activation of ADH as a compensatory mechanism.     Plan:   Keep monitoring the sodium levels and start fluid restriction as it is indicated to be the mainstay treatment for hyponatremia with heart failure. Will check if the sodium level goes below 125 mEq/L.     .

## 2019-11-06 NOTE — PROGRESS NOTES
Telemetry Shift Summary    Rhythm sr/st  HR Range   Ectopy rpvc  Measurements 0.18/0.10/0.38        Normal Values  Rhythm SR  HR Range    Measurements 0.12-0.20 / 0.06-0.10  / 0.30-0.52

## 2019-11-06 NOTE — PROGRESS NOTES
MD Garcia called to question all the cardiac/diuretic meds with a systolic bp of 98.  Per MD Garcia, change carvedilol to 6.5mg bid, give aldactone.  Hold on the lasix and recheck bp in 2 hours.  OK to order benadryl 25mg one time dose for itchiness.

## 2019-11-06 NOTE — PROGRESS NOTES
Telemetry Shift Summary     Rhythm SR  HR Range 67-90  Ectopy PVC  Measurements 0.2/0.1/0.4           Normal Values  Rhythm SR  HR Range    Measurements 0.12-0.20 / 0.06-0.10  / 0.30-0.52

## 2019-11-06 NOTE — PROGRESS NOTES
Report given to AILYN Nieves. Pt denies needs at this time. Safety precautions in place. Call light within reach.

## 2019-11-07 VITALS
WEIGHT: 190.92 LBS | SYSTOLIC BLOOD PRESSURE: 100 MMHG | TEMPERATURE: 97.3 F | RESPIRATION RATE: 18 BRPM | HEART RATE: 79 BPM | BODY MASS INDEX: 32.59 KG/M2 | DIASTOLIC BLOOD PRESSURE: 84 MMHG | HEIGHT: 64 IN | OXYGEN SATURATION: 94 %

## 2019-11-07 PROBLEM — D72.829 LEUKOCYTOSIS: Status: RESOLVED | Noted: 2019-11-05 | Resolved: 2019-11-07

## 2019-11-07 PROBLEM — J96.01 ACUTE RESPIRATORY FAILURE WITH HYPOXIA (HCC): Status: RESOLVED | Noted: 2018-09-26 | Resolved: 2019-11-07

## 2019-11-07 PROBLEM — E87.29 HIGH ANION GAP METABOLIC ACIDOSIS: Status: RESOLVED | Noted: 2019-11-05 | Resolved: 2019-11-07

## 2019-11-07 PROBLEM — R07.9 CHEST PAIN: Status: RESOLVED | Noted: 2019-11-05 | Resolved: 2019-11-07

## 2019-11-07 PROBLEM — I16.1 HYPERTENSIVE EMERGENCY: Status: RESOLVED | Noted: 2018-09-26 | Resolved: 2019-11-07

## 2019-11-07 LAB
ALBUMIN SERPL BCP-MCNC: 3.9 G/DL (ref 3.2–4.9)
ALBUMIN/GLOB SERPL: 1.4 G/DL
ALP SERPL-CCNC: 82 U/L (ref 30–99)
ALT SERPL-CCNC: 29 U/L (ref 2–50)
ANION GAP SERPL CALC-SCNC: 15 MMOL/L (ref 0–11.9)
AST SERPL-CCNC: 35 U/L (ref 12–45)
BASOPHILS # BLD AUTO: 0.3 % (ref 0–1.8)
BASOPHILS # BLD: 0.03 K/UL (ref 0–0.12)
BILIRUB SERPL-MCNC: 0.2 MG/DL (ref 0.1–1.5)
BUN SERPL-MCNC: 25 MG/DL (ref 8–22)
CALCIUM SERPL-MCNC: 9.6 MG/DL (ref 8.4–10.2)
CHLORIDE SERPL-SCNC: 97 MMOL/L (ref 96–112)
CO2 SERPL-SCNC: 27 MMOL/L (ref 20–33)
COLLECT DURATION TIME SPEC: NORMAL H
CREAT SERPL-MCNC: 0.96 MG/DL (ref 0.5–1.4)
EOSINOPHIL # BLD AUTO: 0.07 K/UL (ref 0–0.51)
EOSINOPHIL NFR BLD: 0.7 % (ref 0–6.9)
ERYTHROCYTE [DISTWIDTH] IN BLOOD BY AUTOMATED COUNT: 45.8 FL (ref 35.9–50)
GLOBULIN SER CALC-MCNC: 2.7 G/DL (ref 1.9–3.5)
GLUCOSE SERPL-MCNC: 114 MG/DL (ref 65–99)
HCT VFR BLD AUTO: 43.3 % (ref 37–47)
HGB BLD-MCNC: 13.7 G/DL (ref 12–16)
IMM GRANULOCYTES # BLD AUTO: 0.14 K/UL (ref 0–0.11)
IMM GRANULOCYTES NFR BLD AUTO: 1.3 % (ref 0–0.9)
LYMPHOCYTES # BLD AUTO: 3.29 K/UL (ref 1–4.8)
LYMPHOCYTES NFR BLD: 31.2 % (ref 22–41)
MCH RBC QN AUTO: 28.1 PG (ref 27–33)
MCHC RBC AUTO-ENTMCNC: 31.6 G/DL (ref 33.6–35)
MCV RBC AUTO: 88.7 FL (ref 81.4–97.8)
MONOCYTES # BLD AUTO: 1.13 K/UL (ref 0–0.85)
MONOCYTES NFR BLD AUTO: 10.7 % (ref 0–13.4)
NEUTROPHILS # BLD AUTO: 5.87 K/UL (ref 2–7.15)
NEUTROPHILS NFR BLD: 55.8 % (ref 44–72)
NRBC # BLD AUTO: 0 K/UL
NRBC BLD-RTO: 0 /100 WBC
OXALATE 24H UR-MCNC: 25 MG/L
OXALATE 24H UR-MRATE: NORMAL MG/D (ref 13–40)
PLATELET # BLD AUTO: 314 K/UL (ref 164–446)
PMV BLD AUTO: 10.5 FL (ref 9–12.9)
POTASSIUM SERPL-SCNC: 4.4 MMOL/L (ref 3.6–5.5)
PROT SERPL-MCNC: 6.6 G/DL (ref 6–8.2)
RBC # BLD AUTO: 4.88 M/UL (ref 4.2–5.4)
SODIUM SERPL-SCNC: 139 MMOL/L (ref 135–145)
TOTAL VOLUME 1105: NORMAL ML
WBC # BLD AUTO: 10.5 K/UL (ref 4.8–10.8)

## 2019-11-07 PROCEDURE — A9270 NON-COVERED ITEM OR SERVICE: HCPCS | Performed by: INTERNAL MEDICINE

## 2019-11-07 PROCEDURE — 700102 HCHG RX REV CODE 250 W/ 637 OVERRIDE(OP): Performed by: INTERNAL MEDICINE

## 2019-11-07 PROCEDURE — 80053 COMPREHEN METABOLIC PANEL: CPT

## 2019-11-07 PROCEDURE — 99239 HOSP IP/OBS DSCHRG MGMT >30: CPT | Performed by: HOSPITALIST

## 2019-11-07 PROCEDURE — 700102 HCHG RX REV CODE 250 W/ 637 OVERRIDE(OP): Performed by: HOSPITALIST

## 2019-11-07 PROCEDURE — 85025 COMPLETE CBC W/AUTO DIFF WBC: CPT

## 2019-11-07 PROCEDURE — 700111 HCHG RX REV CODE 636 W/ 250 OVERRIDE (IP): Performed by: INTERNAL MEDICINE

## 2019-11-07 PROCEDURE — A9270 NON-COVERED ITEM OR SERVICE: HCPCS | Performed by: HOSPITALIST

## 2019-11-07 RX ORDER — CARVEDILOL 12.5 MG/1
12.5 TABLET ORAL 2 TIMES DAILY WITH MEALS
Qty: 60 TAB | Refills: 1 | Status: ON HOLD | OUTPATIENT
Start: 2019-11-07 | End: 2019-11-12 | Stop reason: SDUPTHER

## 2019-11-07 RX ORDER — LOSARTAN POTASSIUM 25 MG/1
25 TABLET ORAL
Qty: 90 TAB | Refills: 3 | Status: SHIPPED | OUTPATIENT
Start: 2019-11-07 | End: 2019-11-07

## 2019-11-07 RX ORDER — LOSARTAN POTASSIUM 25 MG/1
25 TABLET ORAL
Qty: 30 TAB | Refills: 1 | Status: ON HOLD | OUTPATIENT
Start: 2019-11-08 | End: 2019-11-12

## 2019-11-07 RX ORDER — SPIRONOLACTONE 25 MG/1
25 TABLET ORAL DAILY
Qty: 30 TAB | Refills: 3 | Status: SHIPPED | OUTPATIENT
Start: 2019-11-08 | End: 2019-12-10

## 2019-11-07 RX ORDER — DULOXETIN HYDROCHLORIDE 60 MG/1
60 CAPSULE, DELAYED RELEASE ORAL DAILY
Qty: 30 CAP | Refills: 0 | Status: SHIPPED | OUTPATIENT
Start: 2019-11-08 | End: 2019-12-10

## 2019-11-07 RX ORDER — LOSARTAN POTASSIUM 25 MG/1
25 TABLET ORAL
Qty: 30 TAB | Refills: 1 | Status: SHIPPED | OUTPATIENT
Start: 2019-11-08 | End: 2019-11-07

## 2019-11-07 RX ADMIN — PREDNISONE 20 MG: 10 TABLET ORAL at 05:16

## 2019-11-07 RX ADMIN — DULOXETINE HYDROCHLORIDE 60 MG: 30 CAPSULE, DELAYED RELEASE ORAL at 05:16

## 2019-11-07 RX ADMIN — GABAPENTIN 400 MG: 400 CAPSULE ORAL at 05:16

## 2019-11-07 RX ADMIN — CEVIMELINE HYDROCHLORIDE 30 MG: 30 CAPSULE ORAL at 12:00

## 2019-11-07 RX ADMIN — POTASSIUM CHLORIDE 40 MEQ: 20 TABLET, EXTENDED RELEASE ORAL at 05:17

## 2019-11-07 RX ADMIN — SPIRONOLACTONE 25 MG: 25 TABLET ORAL at 05:19

## 2019-11-07 RX ADMIN — LIOTHYRONINE SODIUM 25 MCG: 5 TABLET ORAL at 08:17

## 2019-11-07 RX ADMIN — OMEPRAZOLE 20 MG: 20 CAPSULE, DELAYED RELEASE ORAL at 05:19

## 2019-11-07 RX ADMIN — LEVOTHYROXINE SODIUM 75 MCG: 50 TABLET ORAL at 05:18

## 2019-11-07 RX ADMIN — LOSARTAN POTASSIUM 25 MG: 25 TABLET ORAL at 05:19

## 2019-11-07 RX ADMIN — FUROSEMIDE 40 MG: 10 INJECTION, SOLUTION INTRAMUSCULAR; INTRAVENOUS at 05:19

## 2019-11-07 RX ADMIN — CEVIMELINE HYDROCHLORIDE 30 MG: 30 CAPSULE ORAL at 06:00

## 2019-11-07 RX ADMIN — ENOXAPARIN SODIUM 40 MG: 100 INJECTION SUBCUTANEOUS at 05:17

## 2019-11-07 RX ADMIN — ESTRADIOL 0.5 MG: 1 TABLET ORAL at 05:18

## 2019-11-07 RX ADMIN — ACETAMINOPHEN 650 MG: 325 TABLET, FILM COATED ORAL at 07:52

## 2019-11-07 RX ADMIN — CARVEDILOL 12.5 MG: 6.25 TABLET, FILM COATED ORAL at 07:52

## 2019-11-07 NOTE — PROGRESS NOTES
Received report from AILYN Nieves. Patient is awake, Pt resting comfortably in bed, plan of care discussed with patient, declines any needs at this time and denies pain. Call light within reach and safety precautions in place.

## 2019-11-07 NOTE — DISCHARGE INSTRUCTIONS
Discharge Instructions    Discharged to home by car with relative. Discharged via wheelchair, hospital escort: Yes.  Special equipment needed: Not Applicable    Be sure to schedule a follow-up appointment with your primary care doctor or any specialists as instructed.     Discharge Plan:   Diet Plan: Discussed  Activity Level: Discussed  Confirmed Follow up Appointment: Appointment Scheduled  Confirmed Symptoms Management: Discussed  Medication Reconciliation Updated: Yes  Influenza Vaccine Indication: Not indicated: Previously immunized this influenza season and > 8 years of age    I understand that a diet low in cholesterol, fat, and sodium is recommended for good health. Unless I have been given specific instructions below for another diet, I accept this instruction as my diet prescription.   Other diet: Cardiac    Special Instructions:     HF Patient Discharge Instructions  · Monitor your weight daily, and maintain a weight chart, to track your weight changes.   · Activity as tolerated, unless your Doctor has ordered otherwise. Other activity order: as tolerate.  · Follow a low fat, low cholesterol, low salt diet unless instructed otherwise by your Doctor. Read the labels on the back of food products and track your intake of fat, cholesterol and salt.   · Fluid Restriction No. If a Fluid Restriction has been ordered by your Doctor, measure fluids with a measuring cup to ensure that you are not exceeding the restriction.   · No smoking.  · Oxygen No. If your Doctor has ordered that you wear Oxygen at home, it is important to wear it as ordered.  · Did you receive an explanation from staff on the importance of taking each of your medications and why it is necessary to stay on the medications the physician/care provider has ordered? Yes  · Do you have any questions concerning how to manage your heart failure and what to do should you have any increased signs and symptoms after you go home? No  · Do you feel like  your heart failure care team involved you in the care treatment plan and allowed you to make decisions regarding your care while in the hospital and addressed any discharge needs you might have? No    See the educational handout provided at discharge for more information on monitoring your daily weight, activity and diet. This also explains more about Heart Failure, symptoms of a flare-up and some of the tests that you have undergone.     Warning Signs of a Flare-Up include:  · Swelling in the ankles or lower legs.  · Shortness of breath, while at rest, or while doing normal activities.   · Shortness of breath at night when in bed, or coughing in bed.   · Requiring more pillows to sleep at night, or needing to sit up at night to sleep.  · Feeling weak, dizzy or fatigued.     When to call your Doctor:  · Call [facility] about questions regarding the discharge instructions you were given [phone].  (Discharge Unit Med/Tele)  · Call your Primary Care Physician or Cardiologist if:   1. You experience any pain radiating to your jaw or neck.  2. You have any difficulty breathing.  3. You experience weight gain of 2 lbs in a day or 5 lbs in a week.   4. You feel any palpitations or irregular heartbeats.  5. You become dizzy or lose consciousness.   If you have had an angiogram or had a pacemaker or AICD placed, and experience:  1. Bleeding, drainage or swelling at the surgical / puncture site.  2. Fever greater than 100.0 F  3. Shock from internal defibrillator.  4. Cool and / or numb extremities.      · Is patient discharged on Warfarin / Coumadin?   No     Depression / Suicide Risk    As you are discharged from this [facility], it is important to learn how to keep safe from harming yourself.    Recognize the warning signs:  · Abrupt changes in personality, positive or negative- including increase in energy   · Giving away possessions  · Change in eating patterns- significant  weight changes-  positive or negative  · Change in sleeping patterns- unable to sleep or sleeping all the time   · Unwillingness or inability to communicate  · Depression  · Unusual sadness, discouragement and loneliness  · Talk of wanting to die  · Neglect of personal appearance   · Rebelliousness- reckless behavior  · Withdrawal from people/activities they love  · Confusion- inability to concentrate     If you or a loved one observes any of these behaviors or has concerns about self-harm, here's what you can do:  · Talk about it- your feelings and reasons for harming yourself  · Remove any means that you might use to hurt yourself (examples: pills, rope, extension cords, firearm)  · Get professional help from the community (Mental Health, Substance Abuse, psychological counseling)  · Do not be alone:Call your Safe Contact- someone whom you trust who will be there for you.  · Call your local CRISIS HOTLINE 820-4854 or 591-247-0453  · Call your local Children's Mobile Crisis Response Team [place] (118) 012-0682 or [employer]  · Call the toll free National Suicide Prevention Hotlines   · National Suicide Prevention Lifeline 935-418-GZDW (1711)  · National Hope Line Network 800-SUICIDE (111-2464)    Carvedilol tablets  What is this medicine?  CARVEDILOL (MARYBETH ve dil ol) is a beta-blocker. Beta-blockers reduce the workload on the heart and help it to beat more regularly. This medicine is used to treat high blood pressure and heart failure.  This medicine may be used for other purposes; ask your health care provider or pharmacist if you have questions.  COMMON BRAND NAME(S): Coreg  What should I tell my health care provider before I take this medicine?  They need to know if you have any of these conditions:  -circulation problems  -diabetes  -history of heart attack or heart disease  -liver disease  -lung or breathing disease, like asthma or emphysema  -pheochromocytoma  -slow or irregular  heartbeat  -thyroid disease  -an unusual or allergic reaction to carvedilol, other beta-blockers, medicines, foods, dyes, or preservatives  -pregnant or trying to get pregnant  -breast-feeding  How should I use this medicine?  Take this medicine by mouth with a glass of water. Follow the directions on the prescription label. It is best to take the tablets with food. Take your doses at regular intervals. Do not take your medicine more often than directed. Do not stop taking except on the advice of your doctor or health care professional.  Talk to your pediatrician regarding the use of this medicine in children. Special care may be needed.  Overdosage: If you think you have taken too much of this medicine contact a poison control center or emergency room at once.  NOTE: This medicine is only for you. Do not share this medicine with others.  What if I miss a dose?  If you miss a dose, take it as soon as you can. If it is almost time for your next dose, take only that dose. Do not take double or extra doses.  What may interact with this medicine?  This medicine may interact with the following medications:  -certain medicines for blood pressure, heart disease, irregular heart beat  -certain medicines for depression, like fluoxetine or paroxetine  -certain medicines for diabetes, like glipizide or glyburide  -cimetidine  -clonidine  -cyclosporine  -digoxin  -MAOIs like Carbex, Eldepryl, Marplan, Nardil, and Parnate  -reserpine  -rifampin  This list may not describe all possible interactions. Give your health care provider a list of all the medicines, herbs, non-prescription drugs, or dietary supplements you use. Also tell them if you smoke, drink alcohol, or use illegal drugs. Some items may interact with your medicine.  What should I watch for while using this medicine?  Check your heart rate and blood pressure regularly while you are taking this medicine. Ask your doctor or health care professional what your heart rate  and blood pressure should be, and when you should contact him or her. Do not stop taking this medicine suddenly. This could lead to serious heart-related effects.  Contact your doctor or health care professional if you have difficulty breathing while taking this drug.  Check your weight daily. Ask your doctor or health care professional when you should notify him/her of any weight gain.  You may get drowsy or dizzy. Do not drive, use machinery, or do anything that requires mental alertness until you know how this medicine affects you. To reduce the risk of dizzy or fainting spells, do not sit or stand up quickly. Alcohol can make you more drowsy, and increase flushing and rapid heartbeats. Avoid alcoholic drinks.  If you have diabetes, check your blood sugar as directed. Tell your doctor if you have changes in your blood sugar while you are taking this medicine.  If you are going to have surgery, tell your doctor or health care professional that you are taking this medicine.  What side effects may I notice from receiving this medicine?  Side effects that you should report to your doctor or health care professional as soon as possible:  -allergic reactions like skin rash, itching or hives, swelling of the face, lips, or tongue  -breathing problems  -dark urine  -irregular heartbeat  -swollen legs or ankles  -vomiting  -yellowing of the eyes or skin  Side effects that usually do not require medical attention (report to your doctor or health care professional if they continue or are bothersome):  -change in sex drive or performance  -diarrhea  -dry eyes (especially if wearing contact lenses)  -dry, itching skin  -headache  -nausea  -unusually tired  This list may not describe all possible side effects. Call your doctor for medical advice about side effects. You may report side effects to FDA at 0-188-FDA-0484.  Where should I keep my medicine?  Keep out of the reach of children.  Store at room temperature below 30  degrees C (86 degrees F). Protect from moisture. Keep container tightly closed. Throw away any unused medicine after the expiration date.  NOTE: This sheet is a summary. It may not cover all possible information. If you have questions about this medicine, talk to your doctor, pharmacist, or health care provider.  © 2018 Elsevier/Gold Standard (2014-08-24 14:12:02)    Spironolactone tablets  What is this medicine?  SPIRONOLACTONE (barbara [place] LAK tone) is a diuretic. It helps you make more urine and to lose excess water from your body. This medicine is used to treat high blood pressure, and edema or swelling from heart, kidney, or liver disease. It is also used to treat patients who make too much aldosterone or have low potassium.  This medicine may be used for other purposes; ask your health care provider or pharmacist if you have questions.  COMMON BRAND NAME(S): Aldactone  What should I tell my health care provider before I take this medicine?  They need to know if you have any of these conditions:  -high blood level of potassium  -kidney disease or trouble making urine  -liver disease  -an unusual or allergic reaction to spironolactone, other medicines, foods, dyes, or preservatives  -pregnant or trying to get pregnant  -breast-feeding  How should I use this medicine?  Take this medicine by mouth with a drink of water. Follow the directions on your prescription label. You can take it with or without food. If it upsets your stomach, take it with food. Do not take your medicine more often than directed. Remember that you will need to pass more urine after taking this medicine. Do not take your doses at a time of day that will cause you problems. Do not take at bedtime.  Talk to your pediatrician regarding the use of this medicine in children. While this drug may be prescribed for selected conditions, precautions do apply.  Overdosage: If you think you have taken too much of this medicine contact a poison control  center or emergency room at once.  NOTE: This medicine is only for you. Do not share this medicine with others.  What if I miss a dose?  If you miss a dose, take it as soon as you can. If it is almost time for your next dose, take only that dose. Do not take double or extra doses.  What may interact with this medicine?  Do not take this medicine with any of the following medications:  -eplerenone  This medicine may also interact with the following medications:  -corticosteroids  -digoxin  -lithium  -medicines for high blood pressure like ACE inhibitors  -skeletal muscle relaxants like tubocurarine  -NSAIDs, medicines for pain and inflammation, like ibuprofen or naproxen  -potassium products like salt substitute or supplements  -pressor amines like norepinephrine  -some diuretics  This list may not describe all possible interactions. Give your health care provider a list of all the medicines, herbs, non-prescription drugs, or dietary supplements you use. Also tell them if you smoke, drink alcohol, or use illegal drugs. Some items may interact with your medicine.  What should I watch for while using this medicine?  Visit your doctor or health care professional for regular checks on your progress. Check your blood pressure as directed. Ask your doctor what your blood pressure should be, and when you should contact them.  You may need to be on a special diet while taking this medicine. Ask your doctor. Also, ask how many glasses of fluid you need to drink a day. You must not get dehydrated.  This medicine may make you feel confused, dizzy or lightheaded. Drinking alcohol and taking some medicines can make this worse. Do not drive, use machinery, or do anything that needs mental alertness until you know how this medicine affects you. Do not sit or stand up quickly.  What side effects may I notice from receiving this medicine?  Side effects that you should report to your doctor or health care professional as soon as  possible:  -allergic reactions such as skin rash or itching, hives, swelling of the lips, mouth, tongue, or throat  -black or tarry stools  -fast, irregular heartbeat  -fever  -muscle pain, cramps  -numbness, tingling in hands or feet  -trouble breathing  -trouble passing urine  -unusual bleeding  -unusually weak or tired  Side effects that usually do not require medical attention (report to your doctor or health care professional if they continue or are bothersome):  -change in voice or hair growth  -confusion  -dizzy, drowsy  -dry mouth, increased thirst  -enlarged or tender breasts  -headache  -irregular menstrual periods  -sexual difficulty, unable to have an erection  -stomach upset  This list may not describe all possible side effects. Call your doctor for medical advice about side effects. You may report side effects to FDA at 3-701-FDA-0218.  Where should I keep my medicine?  Keep out of the reach of children.  Store below 25 degrees C (77 degrees F). Throw away any unused medicine after the expiration date.  NOTE: This sheet is a summary. It may not cover all possible information. If you have questions about this medicine, talk to your doctor, pharmacist, or health care provider.  © 2018 Elsevier/Gold Standard (2011-08-30 12:51:30)    Heart Failure  Heart failure means your heart has trouble pumping blood. This makes it hard for your body to work well. Heart failure is usually a long-term (chronic) condition. You must take good care of yourself and follow your doctor's treatment plan.  HOME CARE  · Take your heart medicine as told by your doctor.  ¨ Do not stop taking medicine unless your doctor tells you to.  ¨ Do not skip any dose of medicine.  ¨ Refill your medicines before they run out.  ¨ Take other medicines only as told by your doctor or pharmacist.  · Stay active if told by your doctor. The elderly and people with severe heart failure should talk with a doctor about physical activity.  · Eat  heart-healthy foods. Choose foods that are without trans fat and are low in saturated fat, cholesterol, and salt (sodium). This includes fresh or frozen fruits and vegetables, fish, lean meats, fat-free or low-fat dairy foods, whole grains, and high-fiber foods. Lentils and dried peas and beans (legumes) are also good choices.  · Limit salt if told by your doctor.  · Cook in a healthy way. Roast, grill, broil, bake, poach, steam, or stir-gandhi foods.  · Limit fluids as told by your doctor.  · Weigh yourself every morning. Do this after you pee (urinate) and before you eat breakfast. Write down your weight to give to your doctor.  · Take your blood pressure and write it down if your doctor tells you to.  · Ask your doctor how to check your pulse. Check your pulse as told.  · Lose weight if told by your doctor.  · Stop smoking or chewing tobacco. Do not use gum or patches that help you quit without your doctor's approval.  · Schedule and go to doctor visits as told.  · Nonpregnant women should have no more than 1 drink a day. Men should have no more than 2 drinks a day. Talk to your doctor about drinking alcohol.  · Stop illegal drug use.  · Stay current with shots (immunizations).  · Manage your health conditions as told by your doctor.  · Learn to manage your stress.  · Rest when you are tired.  · If it is really hot outside:  ¨ Avoid intense activities.  ¨ Use air conditioning or fans, or get in a cooler place.  ¨ Avoid caffeine and alcohol.  ¨ Wear loose-fitting, lightweight, and light-colored clothing.  · If it is really cold outside:  ¨ Avoid intense activities.  ¨ Layer your clothing.  ¨ Wear mittens or gloves, a hat, and a scarf when going outside.  ¨ Avoid alcohol.  · Learn about heart failure and get support as needed.  · Get help to maintain or improve your quality of life and your ability to care for yourself as needed.  GET HELP IF:   · You gain weight quickly.  · You are more short of breath than  usual.  · You cannot do your normal activities.  · You tire easily.  · You cough more than normal, especially with activity.  · You have any or more puffiness (swelling) in areas such as your hands, feet, ankles, or belly (abdomen).  · You cannot sleep because it is hard to breathe.  · You feel like your heart is beating fast (palpitations).  · You get dizzy or light-headed when you stand up.  GET HELP RIGHT AWAY IF:   · You have trouble breathing.  · There is a change in mental status, such as becoming less alert or not being able to focus.  · You have chest pain or discomfort.  · You faint.  MAKE SURE YOU:   · Understand these instructions.  · Will watch your condition.  · Will get help right away if you are not doing well or get worse.  This information is not intended to replace advice given to you by your health care provider. Make sure you discuss any questions you have with your health care provider.  Document Released: 09/26/2009 Document Revised: 01/08/2016 Document Reviewed: 02/03/2014  Elsevier Interactive Patient Education © 2017 [employer] Inc.

## 2019-11-07 NOTE — PROGRESS NOTES
Pt verbalizes understanding of discharge instructions.  Iv site removed, tele removed. All belongings brought down with patient and CNA escort to meet friend at the front of the building.

## 2019-11-07 NOTE — CARE PLAN
Problem: Venous Thromboembolism (VTW)/Deep Vein Thrombosis (DVT) Prevention:  Goal: Patient will participate in Venous Thrombosis (VTE)/Deep Vein Thrombosis (DVT)Prevention Measures  Outcome: PROGRESSING AS EXPECTED   Encourage ambulation and ordered meds per MAR  Problem: Pain Management  Goal: Pain level will decrease to patient's comfort goal  Outcome: PROGRESSING AS EXPECTED   Pt had a Headache today, gave tylenol as ordered, assessed hour later pt and Pt stated headache is better 2/10.

## 2019-11-07 NOTE — PROGRESS NOTES
Telemetry Shift Summary     Rhythm SR  HR Range 66-95  Ectopy PVC  Measurements 0.18/0.1/0.36           Normal Values  Rhythm SR  HR Range    Measurements 0.12-0.20 / 0.06-0.10  / 0.30-0.52

## 2019-11-07 NOTE — DISCHARGE SUMMARY
Discharge Summary    CHIEF COMPLAINT ON ADMISSION  Chief Complaint   Patient presents with   • Chest Pain     started about 20 minutes ago   strong Hx of cardiac issue   • N/V       Reason for Admission  Chest Pain     Admission Date  11/4/2019    CODE STATUS  Full Code    HPI & HOSPITAL COURSE   is a very pleasant 54 year old female admitted on 11/4/2019 for evaluation of chest pain. On admission she was found to be hypoxic, as a result she underwent a CTA PE study which was negative for pulmonary embolism, did show possible edema. Hence with elevated BNP and troponin's, patient was closely monitor. In addition patient after admission, rapid response was called, because of excruciating chest pain.  At this point we examined the patient again in person.  Given concern for possible ongoing chest pain with mild elevated troponins, cardiology was consulted.  They believe that her chest pain was secondary to uncontrolled hypertension.  Nevertheless an echocardiogram was performed which showed evidence of depressed LVEF of 20%, with apical hypokinesis and reduced LVEF compared to previous echocardiograms.  Again this was thought secondary to uncontrolled hypertension as a result her medications were adjusted, she was placed on carvedilol 12.5 mg twice daily, Aldactone and losartan.  In addition IV Lasix was utilized for diuresis.  Luckily with this combination of medications patient had complete resolution of her chest pain including shortness of breath.  Patient is no longer hypoxic and is off oxygen.   At this point cardiology will follow with her as an outpatient.  We will prescribe her her her cardiac medications.  Recommended her to weigh herself every day and record this, and if it is off by 3 pounds to contact her physicians right away.  Also recommended her to take her blood pressure twice a day and keep a log of it as well. Patient denies fevers/chills, chest pain, shortness of breath or  nausea/vommiting.         Therefore, she is discharged in good and stable condition to home with close outpatient follow-up.    The patient met 2-midnight criteria for an inpatient stay at the time of discharge.    Discharge Date  11/7/2019    FOLLOW UP ITEMS POST DISCHARGE  Cardiology  in 1-2 weeks  PCP in 1 week    DISCHARGE DIAGNOSES  Principal Problem (Resolved):    Chest pain POA: Unknown  Active Problems:    Gastroesophageal reflux disease without esophagitis POA: Unknown    Hypothyroidism POA: Unknown  Resolved Problems:    Acute respiratory failure with hypoxia (HCC) POA: Yes    Hypertensive emergency POA: Yes    Leukocytosis POA: Unknown    High anion gap metabolic acidosis POA: Unknown      FOLLOW UP  Future Appointments   Date Time Provider Department Center   11/13/2019 10:00 AM Elizabeth Lynn M.D. [facility] None     No follow-up provider specified.    MEDICATIONS ON DISCHARGE     Medication List      START taking these medications      Instructions   carvedilol 12.5 MG Tabs  Commonly known as:  COREG   Take 1 Tab by mouth 2 times a day, with meals.  Dose:  12.5 mg     spironolactone 25 MG Tabs  Start taking on:  November 8, 2019  Commonly known as:  ALDACTONE   Take 1 Tab by mouth every day.  Dose:  25 mg        CHANGE how you take these medications            * losartan 25 MG Tabs  Start taking on:  November 8, 2019  What changed:    · how much to take  · when to take this  Commonly known as:  COZAAR   Take 1 Tab by mouth every day.  Dose:  25 mg                 CONTINUE taking these medications      Instructions   acetaminophen 500 MG Tabs  Commonly known as:  TYLENOL   Take 1,000 mg by mouth every 6 hours as needed for Moderate Pain.  Dose:  1,000 mg     AIMOVIG 70 MG/ML Soaj  Generic drug:  Erenumab   1 mL by Injection route Q30 DAYS.  Dose:  1 mL     B-12 1000 MCG/ML Kit   1,000 mcg by Injection route every 7 days. On Tue  Dose:  1,000 mcg     Biotin 5000 MCG Caps   Take 1 Cap by mouth every  day.  Dose:  1 Cap     calcitonin (salmon) 200 UNIT/ACT Soln  Commonly known as:  MIACALCIN   Spray 1 Spray in nose every day.  Dose:  1 Spray     cevimeline 30 MG capsule  Commonly known as:  EVOXAC   Take 1 Cap by mouth 3 times a day.  Dose:  30 mg     colesevelam 625 MG Tabs  Commonly known as:  WELCHOL   Take 625 mg by mouth 3 times a day, with meals.  Dose:  625 mg     Coral Calcium 1000 (390 Ca) MG Tabs   Take 1,000 mg by mouth every evening.  Dose:  1,000 mg     DULoxetine 60 MG Cpep delayed-release capsule  Commonly known as:  CYMBALTA   Take 60 mg by mouth every evening.  Dose:  60 mg     EPINEPHrine 0.3 MG/0.3ML Soaj solution for injection  Commonly known as:  EPIPEN   0.3 mg by Intramuscular route Once. Indications: Life-Threatening Hypersensitivity Reaction  Dose:  0.3 mg     estradiol 0.5 MG tablet  Commonly known as:  ESTRACE   Take 0.5 mg by mouth every day.  Dose:  0.5 mg     FARXIGA 5 MG Tabs  Generic drug:  Dapagliflozin Propanediol   Take 5 mg by mouth every evening.  Dose:  5 mg     FROVA 2.5 MG Tabs  Generic drug:  Frovatriptan Succinate   Take 2.5 mg by mouth as needed (For migraines).  Dose:  2.5 mg     furosemide 20 MG Tabs  Commonly known as:  LASIX   TAKE 0.5 TABS BY MOUTH 2 TIMES A DAY.  Dose:  10 mg     gabapentin 400 MG Caps  Commonly known as:  NEURONTIN   Take 1 Cap by mouth 3 times a day.  Dose:  400 mg     hydrOXYzine HCl 25 MG Tabs  Commonly known as:  ATARAX   Take 25 mg by mouth every bedtime. For sleep  Dose:  25 mg     KLS ALLER-HAWA 10 MG Tabs  Generic drug:  cetirizine   Take 20 mg by mouth 2 times a day.  Dose:  20 mg     liothyronine 25 MCG Tabs  Commonly known as:  CYTOMEL   Take 25 mcg by mouth every evening.  Dose:  25 mcg     magnesium oxide 400 MG Tabs  Commonly known as:  MAG-OX   Take 400 mg by mouth every evening.  Dose:  400 mg     MOBIC 15 MG tablet  Generic drug:  meloxicam   Take 15 mg by mouth every evening.  Dose:  15 mg     montelukast 10 MG Tabs  Commonly  "known as:  SINGULAIR   Take 10 mg by mouth every evening.  Dose:  10 mg     multivitamin Tabs   Take 1 Tab by mouth every day.  Dose:  1 Tab     pantoprazole 40 MG Tbec  Commonly known as:  PROTONIX   Take 40 mg by mouth every evening.  Dose:  40 mg     potassium chloride SA 20 MEQ Tbcr  Commonly known as:  Kdur   Take 1 Tab by mouth every day.  Dose:  20 mEq     predniSONE 20 MG Tabs  Commonly known as:  DELTASONE   Take 20 mg by mouth every day.  Dose:  20 mg     PROBIOTIC PO   Take 1 Tab by mouth every day.  Dose:  1 Tab     SYNTHROID 75 MCG Tabs  Generic drug:  levothyroxine   Take 75 mcg by mouth every evening.  Dose:  75 mcg     tizanidine 4 MG Tabs  Commonly known as:  ZANAFLEX   Take 2-4 mg by mouth every 6 hours as needed. Indications: Muscle Spasticity  Dose:  2-4 mg     Vitamin D 2000 units Caps   Take 2,000 Units by mouth every evening.  Dose:  2,000 Units     Vitamin K2 100 MCG Caps   Take 1 Tab by mouth every day.  Dose:  1 Tab            Allergies  Allergies   Allergen Reactions   • Bactrim [Sulfamethoxazole W-Trimethoprim] Shortness of Breath   • Bee Venom Anaphylaxis   • Econazole Anaphylaxis   • Floxin [Ofloxacin] Anaphylaxis, Shortness of Breath and Swelling     Cause throat swelling and difficulty breathing   • Gadolinium Derivatives Hives and Swelling     Throat swelling   • Iodine Shortness of Breath and Anaphylaxis     Throat and tongue swelling with IV contrast   • Keflex Shortness of Breath   • Levaquin Shortness of Breath     anaphlyaxis   • Levofloxacin Shortness of Breath   • Morphine Anaphylaxis   • Naloxone Hives     \"hives, SOB\"   • Nitrofurantoin Shortness of Breath     ...and hives     • Norco [Apap-Fd&C Yellow #10 Al Tai-Hydrocodone] Shortness of Breath     ...and hives     • Oxycodone Shortness of Breath     ...and hives     • Penicillins Shortness of Breath     ...and hives     • Tape Contact Dermatitis     Blisters, clear tegaderm ok.. No steristrips.   • Ancef [Cefazolin] Hives " "  • Bextra [Valdecoxib] Rash     \"Skin burn and peeling.\"   • Flagyl [Kdc:Metronidazole+Tartrazine] Hives   • Linezolid Rash     Rash all over body   • Other Drug Hives     \"Enconazole nitrate.\" shortness of breath and hives   • Other Misc Unspecified     Adhesive tape: blisters, skin peels off   • Buprenorphine Anaphylaxis   • Clindamycin      HIVES     • Tygacil [Tigecycline]      itching   • Zithromax [Azithromycin] Hives and Shortness of Breath     Pt had Hives also       DIET  Orders Placed This Encounter   Procedures   • Diet Order 2 Gram Sodium, Cardiac     Standing Status:   Standing     Number of Occurrences:   1     Order Specific Question:   Diet:     Answer:   2 Gram Sodium [7]     Order Specific Question:   Diet:     Answer:   Cardiac [6]       ACTIVITY  As tolerated.  Weight bearing as tolerated    CONSULTATIONS  Cardiology Dr To    PROCEDURES  None    LABORATORY  Lab Results   Component Value Date    SODIUM 139 11/07/2019    POTASSIUM 4.4 11/07/2019    CHLORIDE 97 11/07/2019    CO2 27 11/07/2019    GLUCOSE 114 (H) 11/07/2019    BUN 25 (H) 11/07/2019    CREATININE 0.96 11/07/2019        Lab Results   Component Value Date    WBC 10.5 11/07/2019    HEMOGLOBIN 13.7 11/07/2019    HEMATOCRIT 43.3 11/07/2019    PLATELETCT 314 11/07/2019        Total time of the discharge process exceeds 33 minutes.  "

## 2019-11-07 NOTE — PROGRESS NOTES
Telemetry Shift Summary    Rhythm Sr  HR Range 70-92  Ectopy rPVC  Measurements 0.20/0.08/0.40        Normal Values  Rhythm SR  HR Range    Measurements 0.12-0.20 / 0.06-0.10  / 0.30-0.52

## 2019-11-10 ENCOUNTER — HOSPITAL ENCOUNTER (OUTPATIENT)
Facility: MEDICAL CENTER | Age: 54
End: 2019-11-12
Attending: EMERGENCY MEDICINE | Admitting: HOSPITALIST
Payer: MEDICARE

## 2019-11-10 ENCOUNTER — APPOINTMENT (OUTPATIENT)
Dept: RADIOLOGY | Facility: MEDICAL CENTER | Age: 54
End: 2019-11-10
Attending: EMERGENCY MEDICINE
Payer: MEDICARE

## 2019-11-10 DIAGNOSIS — Z86.79 HISTORY OF CHF (CONGESTIVE HEART FAILURE): ICD-10-CM

## 2019-11-10 DIAGNOSIS — R51.9 ACUTE NONINTRACTABLE HEADACHE, UNSPECIFIED HEADACHE TYPE: ICD-10-CM

## 2019-11-10 DIAGNOSIS — R07.9 ACUTE CHEST PAIN: ICD-10-CM

## 2019-11-10 DIAGNOSIS — R55 SYNCOPE, UNSPECIFIED SYNCOPE TYPE: ICD-10-CM

## 2019-11-10 DIAGNOSIS — I95.9 HYPOTENSION, UNSPECIFIED HYPOTENSION TYPE: ICD-10-CM

## 2019-11-10 LAB
ALBUMIN SERPL BCP-MCNC: 3.6 G/DL (ref 3.2–4.9)
ALBUMIN/GLOB SERPL: 1.4 G/DL
ALP SERPL-CCNC: 81 U/L (ref 30–99)
ALT SERPL-CCNC: 23 U/L (ref 2–50)
ANION GAP SERPL CALC-SCNC: 16 MMOL/L (ref 0–11.9)
AST SERPL-CCNC: 18 U/L (ref 12–45)
BASOPHILS # BLD AUTO: 0.3 % (ref 0–1.8)
BASOPHILS # BLD: 0.03 K/UL (ref 0–0.12)
BILIRUB SERPL-MCNC: 0.3 MG/DL (ref 0.1–1.5)
BUN SERPL-MCNC: 23 MG/DL (ref 8–22)
CALCIUM SERPL-MCNC: 8.5 MG/DL (ref 8.4–10.2)
CHLORIDE SERPL-SCNC: 99 MMOL/L (ref 96–112)
CO2 SERPL-SCNC: 20 MMOL/L (ref 20–33)
CREAT SERPL-MCNC: 0.94 MG/DL (ref 0.5–1.4)
EOSINOPHIL # BLD AUTO: 0.06 K/UL (ref 0–0.51)
EOSINOPHIL NFR BLD: 0.5 % (ref 0–6.9)
ERYTHROCYTE [DISTWIDTH] IN BLOOD BY AUTOMATED COUNT: 47.6 FL (ref 35.9–50)
GLOBULIN SER CALC-MCNC: 2.5 G/DL (ref 1.9–3.5)
GLUCOSE SERPL-MCNC: 170 MG/DL (ref 65–99)
HCT VFR BLD AUTO: 44.2 % (ref 37–47)
HGB BLD-MCNC: 13.9 G/DL (ref 12–16)
IMM GRANULOCYTES # BLD AUTO: 0.07 K/UL (ref 0–0.11)
IMM GRANULOCYTES NFR BLD AUTO: 0.6 % (ref 0–0.9)
LYMPHOCYTES # BLD AUTO: 1.07 K/UL (ref 1–4.8)
LYMPHOCYTES NFR BLD: 9.5 % (ref 22–41)
MAGNESIUM SERPL-MCNC: 1.7 MG/DL (ref 1.5–2.5)
MCH RBC QN AUTO: 27.8 PG (ref 27–33)
MCHC RBC AUTO-ENTMCNC: 31.4 G/DL (ref 33.6–35)
MCV RBC AUTO: 88.4 FL (ref 81.4–97.8)
MONOCYTES # BLD AUTO: 0.51 K/UL (ref 0–0.85)
MONOCYTES NFR BLD AUTO: 4.5 % (ref 0–13.4)
NEUTROPHILS # BLD AUTO: 9.48 K/UL (ref 2–7.15)
NEUTROPHILS NFR BLD: 84.6 % (ref 44–72)
NRBC # BLD AUTO: 0 K/UL
NRBC BLD-RTO: 0 /100 WBC
NT-PROBNP SERPL IA-MCNC: 1370 PG/ML (ref 0–125)
PLATELET # BLD AUTO: 282 K/UL (ref 164–446)
PMV BLD AUTO: 10.4 FL (ref 9–12.9)
POTASSIUM SERPL-SCNC: 4.2 MMOL/L (ref 3.6–5.5)
PROT SERPL-MCNC: 6.1 G/DL (ref 6–8.2)
RBC # BLD AUTO: 5 M/UL (ref 4.2–5.4)
SODIUM SERPL-SCNC: 135 MMOL/L (ref 135–145)
TROPONIN T SERPL-MCNC: 18 NG/L (ref 6–19)
WBC # BLD AUTO: 11.2 K/UL (ref 4.8–10.8)

## 2019-11-10 PROCEDURE — 70450 CT HEAD/BRAIN W/O DYE: CPT

## 2019-11-10 PROCEDURE — 85025 COMPLETE CBC W/AUTO DIFF WBC: CPT

## 2019-11-10 PROCEDURE — A9270 NON-COVERED ITEM OR SERVICE: HCPCS | Performed by: EMERGENCY MEDICINE

## 2019-11-10 PROCEDURE — 94760 N-INVAS EAR/PLS OXIMETRY 1: CPT

## 2019-11-10 PROCEDURE — 93005 ELECTROCARDIOGRAM TRACING: CPT | Performed by: EMERGENCY MEDICINE

## 2019-11-10 PROCEDURE — 80053 COMPREHEN METABOLIC PANEL: CPT

## 2019-11-10 PROCEDURE — 83880 ASSAY OF NATRIURETIC PEPTIDE: CPT

## 2019-11-10 PROCEDURE — 99285 EMERGENCY DEPT VISIT HI MDM: CPT

## 2019-11-10 PROCEDURE — 700102 HCHG RX REV CODE 250 W/ 637 OVERRIDE(OP): Performed by: EMERGENCY MEDICINE

## 2019-11-10 PROCEDURE — 83735 ASSAY OF MAGNESIUM: CPT

## 2019-11-10 PROCEDURE — 84484 ASSAY OF TROPONIN QUANT: CPT

## 2019-11-10 PROCEDURE — 71046 X-RAY EXAM CHEST 2 VIEWS: CPT

## 2019-11-10 PROCEDURE — 700105 HCHG RX REV CODE 258: Performed by: EMERGENCY MEDICINE

## 2019-11-10 RX ORDER — SODIUM CHLORIDE, SODIUM LACTATE, POTASSIUM CHLORIDE, CALCIUM CHLORIDE 600; 310; 30; 20 MG/100ML; MG/100ML; MG/100ML; MG/100ML
250 INJECTION, SOLUTION INTRAVENOUS ONCE
Status: COMPLETED | OUTPATIENT
Start: 2019-11-10 | End: 2019-11-11

## 2019-11-10 RX ORDER — ASPIRIN 81 MG/1
324 TABLET, CHEWABLE ORAL ONCE
Status: COMPLETED | OUTPATIENT
Start: 2019-11-10 | End: 2019-11-10

## 2019-11-10 RX ORDER — ACETAMINOPHEN 325 MG/1
650 TABLET ORAL ONCE
Status: COMPLETED | OUTPATIENT
Start: 2019-11-11 | End: 2019-11-11

## 2019-11-10 RX ORDER — PROMETHAZINE HYDROCHLORIDE 25 MG/1
25 TABLET ORAL ONCE
Status: COMPLETED | OUTPATIENT
Start: 2019-11-10 | End: 2019-11-10

## 2019-11-10 RX ADMIN — SODIUM CHLORIDE, POTASSIUM CHLORIDE, SODIUM LACTATE AND CALCIUM CHLORIDE 250 ML: 600; 310; 30; 20 INJECTION, SOLUTION INTRAVENOUS at 23:56

## 2019-11-10 RX ADMIN — ASPIRIN 81 MG CHEWABLE TABLET 324 MG: 81 TABLET CHEWABLE at 22:52

## 2019-11-10 RX ADMIN — PROMETHAZINE HYDROCHLORIDE 25 MG: 25 TABLET ORAL at 22:53

## 2019-11-10 ASSESSMENT — PAIN DESCRIPTION - DESCRIPTORS: DESCRIPTORS: PRESSURE

## 2019-11-11 ENCOUNTER — APPOINTMENT (OUTPATIENT)
Dept: RADIOLOGY | Facility: MEDICAL CENTER | Age: 54
End: 2019-11-11
Attending: HOSPITALIST
Payer: MEDICARE

## 2019-11-11 PROBLEM — I50.22 CHRONIC SYSTOLIC HEART FAILURE (HCC): Status: ACTIVE | Noted: 2018-10-11

## 2019-11-11 PROBLEM — R07.89 ATYPICAL CHEST PAIN: Status: ACTIVE | Noted: 2019-11-05

## 2019-11-11 PROBLEM — I50.20 SYSTOLIC CHF (HCC): Status: ACTIVE | Noted: 2018-10-11

## 2019-11-11 PROBLEM — I95.9 HYPOTENSION: Status: ACTIVE | Noted: 2019-11-11

## 2019-11-11 LAB
ANION GAP SERPL CALC-SCNC: 15 MMOL/L (ref 0–11.9)
BASOPHILS # BLD AUTO: 0.6 % (ref 0–1.8)
BASOPHILS # BLD: 0.04 K/UL (ref 0–0.12)
BUN SERPL-MCNC: 30 MG/DL (ref 8–22)
CALCIUM SERPL-MCNC: 9 MG/DL (ref 8.4–10.2)
CHLORIDE SERPL-SCNC: 99 MMOL/L (ref 96–112)
CO2 SERPL-SCNC: 23 MMOL/L (ref 20–33)
CREAT SERPL-MCNC: 1.18 MG/DL (ref 0.5–1.4)
EKG IMPRESSION: NORMAL
EOSINOPHIL # BLD AUTO: 0.21 K/UL (ref 0–0.51)
EOSINOPHIL NFR BLD: 3 % (ref 0–6.9)
ERYTHROCYTE [DISTWIDTH] IN BLOOD BY AUTOMATED COUNT: 48.8 FL (ref 35.9–50)
GLUCOSE SERPL-MCNC: 119 MG/DL (ref 65–99)
HCT VFR BLD AUTO: 42.9 % (ref 37–47)
HGB BLD-MCNC: 13.4 G/DL (ref 12–16)
IMM GRANULOCYTES # BLD AUTO: 0.07 K/UL (ref 0–0.11)
IMM GRANULOCYTES NFR BLD AUTO: 1 % (ref 0–0.9)
LYMPHOCYTES # BLD AUTO: 1 K/UL (ref 1–4.8)
LYMPHOCYTES NFR BLD: 14.1 % (ref 22–41)
MCH RBC QN AUTO: 28 PG (ref 27–33)
MCHC RBC AUTO-ENTMCNC: 31.2 G/DL (ref 33.6–35)
MCV RBC AUTO: 89.6 FL (ref 81.4–97.8)
MONOCYTES # BLD AUTO: 0.57 K/UL (ref 0–0.85)
MONOCYTES NFR BLD AUTO: 8 % (ref 0–13.4)
NEUTROPHILS # BLD AUTO: 5.2 K/UL (ref 2–7.15)
NEUTROPHILS NFR BLD: 73.3 % (ref 44–72)
NRBC # BLD AUTO: 0 K/UL
NRBC BLD-RTO: 0 /100 WBC
PLATELET # BLD AUTO: 235 K/UL (ref 164–446)
PMV BLD AUTO: 10.1 FL (ref 9–12.9)
POTASSIUM SERPL-SCNC: 3.6 MMOL/L (ref 3.6–5.5)
RBC # BLD AUTO: 4.79 M/UL (ref 4.2–5.4)
SODIUM SERPL-SCNC: 137 MMOL/L (ref 135–145)
TROPONIN T SERPL-MCNC: 14 NG/L (ref 6–19)
TROPONIN T SERPL-MCNC: 16 NG/L (ref 6–19)
WBC # BLD AUTO: 7.1 K/UL (ref 4.8–10.8)

## 2019-11-11 PROCEDURE — P9047 ALBUMIN (HUMAN), 25%, 50ML: HCPCS

## 2019-11-11 PROCEDURE — G0378 HOSPITAL OBSERVATION PER HR: HCPCS

## 2019-11-11 PROCEDURE — 99220 PR INITIAL OBSERVATION CARE,LEVL III: CPT | Performed by: HOSPITALIST

## 2019-11-11 PROCEDURE — 96365 THER/PROPH/DIAG IV INF INIT: CPT

## 2019-11-11 PROCEDURE — 700111 HCHG RX REV CODE 636 W/ 250 OVERRIDE (IP)

## 2019-11-11 PROCEDURE — A9270 NON-COVERED ITEM OR SERVICE: HCPCS | Performed by: HOSPITALIST

## 2019-11-11 PROCEDURE — 700102 HCHG RX REV CODE 250 W/ 637 OVERRIDE(OP): Performed by: HOSPITALIST

## 2019-11-11 PROCEDURE — 96366 THER/PROPH/DIAG IV INF ADDON: CPT

## 2019-11-11 PROCEDURE — 700102 HCHG RX REV CODE 250 W/ 637 OVERRIDE(OP): Performed by: EMERGENCY MEDICINE

## 2019-11-11 PROCEDURE — 73030 X-RAY EXAM OF SHOULDER: CPT | Mod: LT

## 2019-11-11 PROCEDURE — A9270 NON-COVERED ITEM OR SERVICE: HCPCS | Performed by: EMERGENCY MEDICINE

## 2019-11-11 PROCEDURE — 80048 BASIC METABOLIC PNL TOTAL CA: CPT

## 2019-11-11 PROCEDURE — 85025 COMPLETE CBC W/AUTO DIFF WBC: CPT

## 2019-11-11 PROCEDURE — 84484 ASSAY OF TROPONIN QUANT: CPT | Mod: 91

## 2019-11-11 RX ORDER — CETIRIZINE HYDROCHLORIDE 10 MG/1
20 TABLET ORAL 2 TIMES DAILY
Status: DISCONTINUED | OUTPATIENT
Start: 2019-11-11 | End: 2019-11-12 | Stop reason: HOSPADM

## 2019-11-11 RX ORDER — LOSARTAN POTASSIUM 25 MG/1
25 TABLET ORAL
Status: DISCONTINUED | OUTPATIENT
Start: 2019-11-11 | End: 2019-11-12 | Stop reason: HOSPADM

## 2019-11-11 RX ORDER — BISACODYL 10 MG
10 SUPPOSITORY, RECTAL RECTAL
Status: DISCONTINUED | OUTPATIENT
Start: 2019-11-11 | End: 2019-11-12 | Stop reason: HOSPADM

## 2019-11-11 RX ORDER — ACETAMINOPHEN 500 MG
1000 TABLET ORAL EVERY 6 HOURS PRN
Status: DISCONTINUED | OUTPATIENT
Start: 2019-11-11 | End: 2019-11-12 | Stop reason: HOSPADM

## 2019-11-11 RX ORDER — MONTELUKAST SODIUM 10 MG/1
10 TABLET ORAL EVERY EVENING
Status: DISCONTINUED | OUTPATIENT
Start: 2019-11-11 | End: 2019-11-12 | Stop reason: HOSPADM

## 2019-11-11 RX ORDER — LEVOTHYROXINE SODIUM 0.05 MG/1
75 TABLET ORAL EVERY EVENING
Status: DISCONTINUED | OUTPATIENT
Start: 2019-11-11 | End: 2019-11-12 | Stop reason: HOSPADM

## 2019-11-11 RX ORDER — CEVIMELINE HYDROCHLORIDE 30 MG/1
30 CAPSULE ORAL 3 TIMES DAILY
Status: DISCONTINUED | OUTPATIENT
Start: 2019-11-11 | End: 2019-11-12 | Stop reason: HOSPADM

## 2019-11-11 RX ORDER — ESTRADIOL 1 MG/1
0.5 TABLET ORAL DAILY
Status: DISCONTINUED | OUTPATIENT
Start: 2019-11-11 | End: 2019-11-12 | Stop reason: HOSPADM

## 2019-11-11 RX ORDER — ALBUMIN (HUMAN) 12.5 G/50ML
SOLUTION INTRAVENOUS
Status: COMPLETED
Start: 2019-11-11 | End: 2019-11-11

## 2019-11-11 RX ORDER — LIOTHYRONINE SODIUM 5 UG/1
25 TABLET ORAL EVERY EVENING
Status: DISCONTINUED | OUTPATIENT
Start: 2019-11-11 | End: 2019-11-12 | Stop reason: HOSPADM

## 2019-11-11 RX ORDER — ALBUMIN (HUMAN) 12.5 G/50ML
12.5 SOLUTION INTRAVENOUS ONCE
Status: COMPLETED | OUTPATIENT
Start: 2019-11-11 | End: 2019-11-11

## 2019-11-11 RX ORDER — FUROSEMIDE 20 MG/1
10 TABLET ORAL 2 TIMES DAILY
Status: DISCONTINUED | OUTPATIENT
Start: 2019-11-11 | End: 2019-11-12 | Stop reason: HOSPADM

## 2019-11-11 RX ORDER — OMEPRAZOLE 20 MG/1
20 CAPSULE, DELAYED RELEASE ORAL EVERY EVENING
Status: DISCONTINUED | OUTPATIENT
Start: 2019-11-11 | End: 2019-11-12 | Stop reason: HOSPADM

## 2019-11-11 RX ORDER — SPIRONOLACTONE 25 MG/1
25 TABLET ORAL
Status: DISCONTINUED | OUTPATIENT
Start: 2019-11-11 | End: 2019-11-12 | Stop reason: HOSPADM

## 2019-11-11 RX ORDER — CARVEDILOL 6.25 MG/1
6.25 TABLET ORAL 2 TIMES DAILY WITH MEALS
Status: DISCONTINUED | OUTPATIENT
Start: 2019-11-11 | End: 2019-11-12 | Stop reason: HOSPADM

## 2019-11-11 RX ORDER — POTASSIUM CHLORIDE 20 MEQ/1
20 TABLET, EXTENDED RELEASE ORAL DAILY
Status: DISCONTINUED | OUTPATIENT
Start: 2019-11-11 | End: 2019-11-12 | Stop reason: HOSPADM

## 2019-11-11 RX ORDER — DULOXETIN HYDROCHLORIDE 30 MG/1
60 CAPSULE, DELAYED RELEASE ORAL EVERY EVENING
Status: DISCONTINUED | OUTPATIENT
Start: 2019-11-11 | End: 2019-11-12 | Stop reason: HOSPADM

## 2019-11-11 RX ORDER — POLYETHYLENE GLYCOL 3350 17 G/17G
1 POWDER, FOR SOLUTION ORAL
Status: DISCONTINUED | OUTPATIENT
Start: 2019-11-11 | End: 2019-11-12 | Stop reason: HOSPADM

## 2019-11-11 RX ORDER — AMOXICILLIN 250 MG
2 CAPSULE ORAL 2 TIMES DAILY
Status: DISCONTINUED | OUTPATIENT
Start: 2019-11-11 | End: 2019-11-12 | Stop reason: HOSPADM

## 2019-11-11 RX ORDER — ALBUTEROL SULFATE 90 UG/1
2 AEROSOL, METERED RESPIRATORY (INHALATION)
Status: DISCONTINUED | OUTPATIENT
Start: 2019-11-11 | End: 2019-11-12 | Stop reason: HOSPADM

## 2019-11-11 RX ADMIN — ACETAMINOPHEN 1000 MG: 500 TABLET, FILM COATED ORAL at 11:37

## 2019-11-11 RX ADMIN — ACETAMINOPHEN 650 MG: 325 TABLET, FILM COATED ORAL at 00:01

## 2019-11-11 RX ADMIN — FUROSEMIDE 10 MG: 20 TABLET ORAL at 17:35

## 2019-11-11 RX ADMIN — MONTELUKAST 10 MG: 10 TABLET, FILM COATED ORAL at 17:34

## 2019-11-11 RX ADMIN — CETIRIZINE HYDROCHLORIDE 20 MG: 10 TABLET, FILM COATED ORAL at 17:34

## 2019-11-11 RX ADMIN — LEVOTHYROXINE SODIUM 75 MCG: 50 TABLET ORAL at 17:35

## 2019-11-11 RX ADMIN — LOSARTAN POTASSIUM 25 MG: 25 TABLET ORAL at 16:32

## 2019-11-11 RX ADMIN — FUROSEMIDE 10 MG: 20 TABLET ORAL at 04:57

## 2019-11-11 RX ADMIN — ALBUMIN (HUMAN) 12.5 G: 12.5 SOLUTION INTRAVENOUS at 01:58

## 2019-11-11 RX ADMIN — OMEPRAZOLE 20 MG: 20 CAPSULE, DELAYED RELEASE ORAL at 17:35

## 2019-11-11 RX ADMIN — POTASSIUM CHLORIDE 20 MEQ: 1500 TABLET, EXTENDED RELEASE ORAL at 04:56

## 2019-11-11 RX ADMIN — LIOTHYRONINE SODIUM 25 MCG: 5 TABLET ORAL at 17:34

## 2019-11-11 RX ADMIN — ALBUMIN (HUMAN) 12.5 G: 5 SOLUTION INTRAVENOUS at 01:58

## 2019-11-11 RX ADMIN — SPIRONOLACTONE 25 MG: 25 TABLET ORAL at 16:32

## 2019-11-11 RX ADMIN — CETIRIZINE HYDROCHLORIDE 20 MG: 10 TABLET, FILM COATED ORAL at 04:59

## 2019-11-11 RX ADMIN — ESTRADIOL 0.5 MG: 1 TABLET ORAL at 04:57

## 2019-11-11 RX ADMIN — CARVEDILOL 6.25 MG: 6.25 TABLET, FILM COATED ORAL at 21:08

## 2019-11-11 RX ADMIN — DULOXETINE HYDROCHLORIDE 60 MG: 30 CAPSULE, DELAYED RELEASE ORAL at 17:35

## 2019-11-11 ASSESSMENT — LIFESTYLE VARIABLES
EVER_SMOKED: NEVER
CONSUMPTION TOTAL: NEGATIVE
HAVE PEOPLE ANNOYED YOU BY CRITICIZING YOUR DRINKING: NO
HAVE YOU EVER FELT YOU SHOULD CUT DOWN ON YOUR DRINKING: NO
EVER FELT BAD OR GUILTY ABOUT YOUR DRINKING: NO
TOTAL SCORE: 0
ALCOHOL_USE: YES
EVER HAD A DRINK FIRST THING IN THE MORNING TO STEADY YOUR NERVES TO GET RID OF A HANGOVER: NO
TOTAL SCORE: 0
DOES PATIENT WANT TO STOP DRINKING: NO
HOW MANY TIMES IN THE PAST YEAR HAVE YOU HAD 5 OR MORE DRINKS IN A DAY: 0
ON A TYPICAL DAY WHEN YOU DRINK ALCOHOL HOW MANY DRINKS DO YOU HAVE: 1
AVERAGE NUMBER OF DAYS PER WEEK YOU HAVE A DRINK CONTAINING ALCOHOL: 1
TOTAL SCORE: 0
EVER_SMOKED: NEVER

## 2019-11-11 ASSESSMENT — COPD QUESTIONNAIRES
DURING THE PAST 4 WEEKS HOW MUCH DID YOU FEEL SHORT OF BREATH: NONE/LITTLE OF THE TIME
HAVE YOU SMOKED AT LEAST 100 CIGARETTES IN YOUR ENTIRE LIFE: NO/DON'T KNOW
DO YOU EVER COUGH UP ANY MUCUS OR PHLEGM?: NO/ONLY WITH OCCASIONAL COLDS OR INFECTIONS
COPD SCREENING SCORE: 1

## 2019-11-11 ASSESSMENT — ENCOUNTER SYMPTOMS
BRUISES/BLEEDS EASILY: 0
MYALGIAS: 1
BACK PAIN: 0
NAUSEA: 0
PALPITATIONS: 0
COUGH: 0
TINGLING: 0
PND: 0
NERVOUS/ANXIOUS: 0
STRIDOR: 0
ORTHOPNEA: 0
FALLS: 1
WEAKNESS: 0
EYE PAIN: 0
CONSTIPATION: 0
TREMORS: 0
BLOOD IN STOOL: 0
NECK PAIN: 1
SPUTUM PRODUCTION: 0
SHORTNESS OF BREATH: 0
FEVER: 0
PHOTOPHOBIA: 0
CHILLS: 0
DOUBLE VISION: 0
HEARTBURN: 0
NECK PAIN: 0
DIZZINESS: 0
HEADACHES: 0
SENSORY CHANGE: 0
VOMITING: 0
BLURRED VISION: 0
MYALGIAS: 0
CLAUDICATION: 0
INSOMNIA: 0
HEMOPTYSIS: 0
DEPRESSION: 0
SORE THROAT: 0
SPEECH CHANGE: 0
MEMORY LOSS: 0

## 2019-11-11 ASSESSMENT — COGNITIVE AND FUNCTIONAL STATUS - GENERAL
DAILY ACTIVITIY SCORE: 24
SUGGESTED CMS G CODE MODIFIER DAILY ACTIVITY: CH
SUGGESTED CMS G CODE MODIFIER MOBILITY: CH
MOBILITY SCORE: 24

## 2019-11-11 ASSESSMENT — PATIENT HEALTH QUESTIONNAIRE - PHQ9
2. FEELING DOWN, DEPRESSED, IRRITABLE, OR HOPELESS: NOT AT ALL
1. LITTLE INTEREST OR PLEASURE IN DOING THINGS: NOT AT ALL
SUM OF ALL RESPONSES TO PHQ9 QUESTIONS 1 AND 2: 0

## 2019-11-11 NOTE — ASSESSMENT & PLAN NOTE
Patient was hypotensive on arrival, systolic blood pressure in the 80s  Suspect dehydration which can be a combination with her being placed on new medications including carvedilol, losartan and Aldactone.  Given that patient has a low LVEF of 20%  Currently a blood pressure is within normal parameters.  I will avoid excessive IV fluids as last time she was hypoxemic and needed IV Lasix for diuresis.  We will monitor her closely and see if she needs adjustments in her carvedilol, and perhaps backing off on lasix from 20mg at home to 10mg? Or holding off.   Will continue to follow today.

## 2019-11-11 NOTE — H&P
"Hospital Medicine History & Physical Note    Date of Service  11/11/2019    Primary Care Physician  Benson Ramirez M.D.    Consultants  None    Code Status  Full Code    Chief Complaint  Syncope    History of Presenting Illness  54 y.o. female,  who presented to the hospital today on 11/10/2019 after 2 syncopal episodes at home.  Patient was in the bathroom brushing her teeth when initially passed out.  That was unwitnessed and when  came up she was waking up.  She had a second witnessed episode reason why he brought her to the emergency department.  Patient states that just prior to the event she had an acute onset of sharp chest pain, mid chest with radiation to her left shoulder and then \"just passed out \".  She thinks she hit the posterior aspect of her head even though no obvious trauma were noticed.  She is chest pain-free at this time however is still complains of left shoulder pain.  She has chronic pain in that shoulder and also underlying history of rotator cuff.  Denies shortness of breath or diaphoresis.    Recent admission: This patient was recently admitted here to [facility] from 11/4/2019 discharged 3 days ago on 11/7/2019 for evaluation of worsening systolic CHF.  She has an EF of 20% on 11/5/2019, therefore started on carvedilol and Aldactone in addition to losartan that she was already taking and Lasix.    ER COURSE:  -Patient was hypotensive upon arrival to the emergency department with blood pressure of 81/62.  Remained hypotensive in spite of 250 mL's IV bolus given in the ED.  -Laboratory showed BNP of 1370 and troponin of 18 ng/L.  -Her EKG is showing T wave inversions on anterior lateral leads.  -CT of the head was negative for acute findings.        Review of Systems  Review of Systems   Constitutional: Negative for fever.   HENT: Negative.  Negative for congestion and sore throat.    Eyes: Negative for blurred vision and double vision.   Respiratory: Negative for cough and " shortness of breath.    Cardiovascular: Positive for chest pain. Negative for palpitations.   Gastrointestinal: Negative for nausea and vomiting.   Genitourinary: Negative for dysuria and urgency.   Musculoskeletal: Positive for falls, myalgias and neck pain.   Skin: Negative for itching and rash.   Neurological: Negative for dizziness, weakness and headaches.   Endo/Heme/Allergies: Does not bruise/bleed easily.   Psychiatric/Behavioral: Negative for depression. The patient does not have insomnia.        Past Medical History   has a past medical history of AAA (abdominal aortic aneurysm) ([facility]), Asthma, Chronic pain, Congestive heart failure ([facility]), Fibromyalgia, GERD (gastroesophageal reflux disease), Hypertension, Migraine, Osteoporosis, Renal disorder, and Urticaria.    Surgical History   has a past surgical history that includes appendectomy (1974); gastroscopy (N/A, 2/7/2019); hysterectomy, total abdominal (1995); gastric bypass laparoscopic (1999); abdominal exploration (2002); shoulder decompression arthroscopic (Left, 2/19/2019); clavicle distal excision (Left, 2/19/2019); and shoulder arthroscopy w/ bicipital tenodesis repair (Left, 2/19/2019).     Family History  family history includes Diabetes in her mother; Heart Attack in her brother; Heart Disease in her brother and mother; Heart Failure in her mother; Hypertension in her brother, father, and mother.     Social History   reports that she has never smoked. She has never used smokeless tobacco. She reports current alcohol use. She reports that she does not use drugs.    Allergies  Allergies   Allergen Reactions   • Bactrim [Sulfamethoxazole W-Trimethoprim] Shortness of Breath   • Bee Venom Anaphylaxis   • Econazole Anaphylaxis   • Floxin [Ofloxacin] Anaphylaxis, Shortness of Breath and Swelling     Cause throat swelling and difficulty breathing   • Gadolinium Derivatives Hives and Swelling     Throat swelling   • Iodine Shortness of Breath and Anaphylaxis  "    Throat and tongue swelling with IV contrast   • Keflex Shortness of Breath   • Levaquin Shortness of Breath     anaphlyaxis   • Levofloxacin Shortness of Breath   • Morphine Anaphylaxis   • Naloxone Hives     \"hives, SOB\"   • Nitrofurantoin Shortness of Breath     ...and hives     • Norco [Apap-Fd&C Yellow #10 Al Tai-Hydrocodone] Shortness of Breath     ...and hives     • Oxycodone Shortness of Breath     ...and hives     • Penicillins Shortness of Breath     ...and hives     • Tape Contact Dermatitis     Blisters, clear tegaderm ok.. No steristrips.   • Ancef [Cefazolin] Hives   • Bextra [Valdecoxib] Rash     \"Skin burn and peeling.\"   • Flagyl [Kdc:Metronidazole+Tartrazine] Hives   • Linezolid Rash     Rash all over body   • Other Drug Hives     \"Enconazole nitrate.\" shortness of breath and hives   • Other Misc Unspecified     Adhesive tape: blisters, skin peels off   • Buprenorphine Anaphylaxis   • Clindamycin      HIVES     • Tygacil [Tigecycline]      itching   • Zithromax [Azithromycin] Hives and Shortness of Breath     Pt had Hives also       Medications  Prior to Admission Medications   Prescriptions Last Dose Informant Patient Reported? Taking?   AIMOVIG 70 MG/ML Solution Auto-injector  Patient Yes No   Si mL by Injection route Q30 DAYS.   Biotin 5000 MCG Cap  Patient Yes No   Sig: Take 1 Cap by mouth every day.   Cholecalciferol (VITAMIN D) 2000 units Cap  Patient Yes No   Sig: Take 2,000 Units by mouth every evening.   Coral Calcium 1000 (390 Ca) MG Tab  Patient Yes No   Sig: Take 1,000 mg by mouth every evening.   Cyanocobalamin (B-12) 1000 MCG/ML Kit  Patient Yes No   Si,000 mcg by Injection route every 7 days. On Tue   DULoxetine (CYMBALTA) 60 MG Cap DR Particles delayed-release capsule  Patient Yes No   Sig: Take 60 mg by mouth every evening.   DULoxetine (CYMBALTA) 60 MG Cap DR Particles delayed-release capsule   No No   Sig: Take 1 Cap by mouth every day.   EPINEPHrine (EPIPEN) 0.3 " MG/0.3ML Solution Auto-injector solution for injection  Patient Yes No   Si.3 mg by Intramuscular route Once. Indications: Life-Threatening Hypersensitivity Reaction   FARXIGA 5 MG Tab  Patient Yes No   Sig: Take 5 mg by mouth every evening.   Frovatriptan Succinate (FROVA) 2.5 MG Tab  Patient Yes No   Sig: Take 2.5 mg by mouth as needed (For migraines).   Menaquinone-7 (VITAMIN K2) 100 MCG Cap  Patient Yes No   Sig: Take 1 Tab by mouth every day.   Probiotic Product (PROBIOTIC PO)  Patient Yes No   Sig: Take 1 Tab by mouth every day.   SYNTHROID 75 MCG Tab  Patient Yes No   Sig: Take 75 mcg by mouth every evening.   acetaminophen (TYLENOL) 500 MG Tab  Patient Yes No   Sig: Take 1,000 mg by mouth every 6 hours as needed for Moderate Pain.   calcitonin, salmon, (MIACALCIN) 200 UNIT/ACT Solution  Patient Yes No   Sig: Spray 1 Spray in nose every day.   carvedilol (COREG) 12.5 MG Tab   No No   Sig: Take 1 Tab by mouth 2 times a day, with meals.   cetirizine (KLS ALLER-HAWA) 10 MG Tab  Patient Yes No   Sig: Take 20 mg by mouth 2 times a day.   cevimeline (EVOXAC) 30 MG capsule  Patient No No   Sig: Take 1 Cap by mouth 3 times a day.   colesevelam (WELCHOL) 625 MG Tab  Patient Yes No   Sig: Take 625 mg by mouth 3 times a day, with meals.   estradiol (ESTRACE) 0.5 MG tablet  Patient Yes No   Sig: Take 0.5 mg by mouth every day.   furosemide (LASIX) 20 MG Tab  Patient No No   Sig: TAKE 0.5 TABS BY MOUTH 2 TIMES A DAY.   gabapentin (NEURONTIN) 400 MG Cap  Patient No No   Sig: Take 1 Cap by mouth 3 times a day.   hydrOXYzine HCl (ATARAX) 25 MG Tab  Patient Yes No   Sig: Take 25 mg by mouth every bedtime. For sleep   liothyronine (CYTOMEL) 25 MCG Tab  Patient Yes No   Sig: Take 25 mcg by mouth every evening.   losartan (COZAAR) 25 MG Tab   No No   Sig: Take 1 Tab by mouth every day.   magnesium oxide (MAG-OX) 400 MG Tab  Patient Yes No   Sig: Take 400 mg by mouth every evening.   meloxicam (MOBIC) 15 MG tablet  Patient Yes  No   Sig: Take 15 mg by mouth every evening.   montelukast (SINGULAIR) 10 MG Tab  Patient Yes No   Sig: Take 10 mg by mouth every evening.   multivitamin (THERAGRAN) Tab  Patient Yes No   Sig: Take 1 Tab by mouth every day.   pantoprazole (PROTONIX) 40 MG Tablet Delayed Response  Patient Yes No   Sig: Take 40 mg by mouth every evening.   potassium chloride SA (KDUR) 20 MEQ Tab CR  Patient No No   Sig: Take 1 Tab by mouth every day.   predniSONE (DELTASONE) 20 MG Tab  Patient Yes No   Sig: Take 20 mg by mouth every day.   spironolactone (ALDACTONE) 25 MG Tab   No No   Sig: Take 1 Tab by mouth every day.   tizanidine (ZANAFLEX) 4 MG Tab  Patient Yes No   Sig: Take 2-4 mg by mouth every 6 hours as needed. Indications: Muscle Spasticity      Facility-Administered Medications: None       Physical Exam  Temp:  [36.7 °C (98 °F)] 36.7 °C (98 °F)  Pulse:  [78-80] 78  Resp:  [20-26] 26  BP: (78-96)/(47-68) 78/47  SpO2:  [95 %-96 %] 96 %    Physical Exam  Constitutional:       Appearance: Normal appearance.   HENT:      Head: Normocephalic and atraumatic.      Mouth/Throat:      Mouth: Mucous membranes are moist.      Pharynx: Oropharynx is clear.   Eyes:      Extraocular Movements: Extraocular movements intact.      Pupils: Pupils are equal, round, and reactive to light.   Neck:      Musculoskeletal: Normal range of motion and neck supple.   Cardiovascular:      Rate and Rhythm: Normal rate and regular rhythm.      Heart sounds: Normal heart sounds.   Pulmonary:      Effort: Pulmonary effort is normal.      Breath sounds: Normal breath sounds.   Abdominal:      General: Abdomen is flat. Bowel sounds are normal.      Palpations: Abdomen is soft.   Musculoskeletal:      Comments: Left shoulder: Mild pain on palpation both anterior and posteriorly.   Skin:     General: Skin is warm and dry.   Neurological:      General: No focal deficit present.      Mental Status: She is alert and oriented to person, place, and time.    Psychiatric:         Mood and Affect: Mood normal.         Behavior: Behavior normal.         Laboratory:  Recent Labs     11/10/19  2247   WBC 11.2*   RBC 5.00   HEMOGLOBIN 13.9   HEMATOCRIT 44.2   MCV 88.4   MCH 27.8   MCHC 31.4*   RDW 47.6   PLATELETCT 282   MPV 10.4     Recent Labs     11/10/19  2247   SODIUM 135   POTASSIUM 4.2   CHLORIDE 99   CO2 20   GLUCOSE 170*   BUN 23*   CREATININE 0.94   CALCIUM 8.5     Recent Labs     11/10/19  2247   ALTSGPT 23   ASTSGOT 18   ALKPHOSPHAT 81   TBILIRUBIN 0.3   GLUCOSE 170*         Recent Labs     11/10/19  2247   NTPROBNP 1370*         Recent Labs     11/10/19  2247   TROPONINT 18       EKG: Normal sinus rhythm at 78 bpm.  Left axis deviation with T wave inversion on anterolateral leads.  This is my interpretation.  No acute ST elevation.    Urinalysis:    No results found     Imaging:  CT-HEAD W/O   Final Result      No intracranial abnormality.               INTERPRETING LOCATION:  [address], [place], [address]      DX-CHEST-2 VIEWS   Final Result      Normal chest.               INTERPRETING LOCATION: [address], [place], [address]      DX-SHOULDER 2+ LEFT    (Results Pending)         Assessment/Plan:   I anticipate this patient is appropriate for observation status at this time.    * Syncope  Assessment & Plan  -Possibly related to hypotension, as patient was a started on 3 new antihypertensive medication on prior admission.  -Hold all blood pressure medications including carvedilol, Aldactone, losartan and Lasix for now.  -Patient has underlying acute systolic congestive CHF exacerbation and her most recent EF is 20% on 11/5/2019, therefore avoiding as much possible IV fluid hydration.  -She received 250 mL's of normal saline bolus in the ED and since her MAP was a still 57, I just ordered 12.5 g of albumin.  -She will be admitted to telemetry unit however if unable to control hypotension may be a low threshold to transfer for ICU care.  -She will likely need  medication adjustments for blood pressure control.  -ACS is not fully excluded at this time.    Hypotension  Assessment & Plan  -Patient hypotensive upon arrival to the emergency department, blood pressure ranging from 80s over 40s to 90s over 50s.  -She was started on carvedilol and Aldactone during her prior hospitalization, discharged from the hospital 3 days ago.  -This could be related to medication induced hypotension, even though she was also complaining of having acute chest pain just prior to the event, therefore acute ACS is still not fully excluded.  -Patient received 250 mL's of normal saline and I additionally added 12.5 g of albumin in order to avoid fluid retention since she was just diagnosed with CHF and her EF is 20%.    -Her blood pressure now is 108/60.  Close monitoring blood pressure.  She will likely need medication adjustment of antihypertensive and CHF medications.    Atypical chest pain  Assessment & Plan  -Chest pain preceded syncopal episode.  -She also does have some T wave inversion on anterolateral leads.  -Her troponin right now is 18 ng/L.  Patient has had elevated troponin on prior admission with a maximum level of 93 ng/L on 11/5/2019.  -Underlying EF is 20% as described.  -Serial cardiac enzymes every 4 hours x3.  -She is chest pain-free at this time.    Pain in joint of left shoulder- (present on admission)  Assessment & Plan  -Patient has history of chronic left shoulder pain and she does have increased pain on palpation on left shoulder and posterior aspect of her back.  -This is possibly due to chronic even though she had a fall in her bathroom prior to coming to the emergency department and unclear if she hit her shoulder.  -Add shoulder x-ray two-view now.  -Pain control PRN.    Systolic CHF (HCC)  Assessment & Plan  -Patient is coming after syncopal episode at home.  -She has elevated BNP now at 1378.  Her baseline BNP upon admission on 11/4/2019 was 1575.  -Patient has an  EF of 20% on echocardiogram done on 11/5/2019, therefore started on carvedilol, Aldactone, continue on losartan and Lasix.  -She does not seem fluid overload at this time.  -Close monitoring.    Hypothyroidism- (present on admission)  Assessment & Plan  -She takes levothyroxine and Cytomel.  I will continue home medication at this time.    Depression  Assessment & Plan  -Stable with no active recurrent episode.  Continue duloxetine.    Hx of gastric bypass in 2009 Decmber - (present on admission)  Assessment & Plan  -Plan as above      VTE prophylaxis: SCD's

## 2019-11-11 NOTE — PROGRESS NOTES
Telemetry Shift Summary         Rhythm SR  HR Range 75-80  Ectopy n/a  Measurements 0.18/0.08/0.42

## 2019-11-11 NOTE — CARE PLAN
Problem: Communication  Goal: The ability to communicate needs accurately and effectively will improve  Outcome: PROGRESSING AS EXPECTED   A&Ox4, able to make needs known.     Problem: Safety  Goal: Will remain free from injury  Outcome: PROGRESSING AS EXPECTED   SBA in room, steady gait, denies CP, dizziness, and SOB. Intermittent headache well controlled with tylenol. Alarms on for safety, supportive  at bedside.      Problem: Venous Thromboembolism (VTW)/Deep Vein Thrombosis (DVT) Prevention:  Goal: Patient will participate in Venous Thrombosis (VTE)/Deep Vein Thrombosis (DVT)Prevention Measures  Outcome: PROGRESSING AS EXPECTED   SCD's while in bed.     Problem: Bowel/Gastric:  Goal: Normal bowel function is maintained or improved  Outcome: PROGRESSING AS EXPECTED   LBM: 11/11, pt reports loose stools, Jose TRAYLOR notified. Denies n/v.      Problem: Discharge Barriers/Planning  Goal: Patient's continuum of care needs will be met  Outcome: PROGRESSING AS EXPECTED   Awaiting L Shoulder x ray.

## 2019-11-11 NOTE — ASSESSMENT & PLAN NOTE
Diagnosed on last admission, she is not in acute exacerbation  Despite elevated BNP, clinically she appears dry

## 2019-11-11 NOTE — ED NOTES
Albumin not supplied on ER floor, NAM aware. Dr. hinds aware, okay to send pt to 3rd floor and start albumin on floor. Pt denies light headedness or dizziness, denies CP or SOB.

## 2019-11-11 NOTE — ED PROVIDER NOTES
"ED Provider Note    CHIEF COMPLAINT  Chief Complaint   Patient presents with   • Chest Pain     radiates to back   • Syncope     2 syncopal events, second one witness by spouse. pt had LOC for about 10 sec, per spouse.         [facility]    Primary care provider: Benson Ramirez M.D.   History obtained from: Patient and   History limited by: None     Donna Isaac is a 54 y.o. female who presents to the ED with  complaining of syncope shortly prior to arrival.  Patient was recently discharged from this hospital and reports that while she was brushing her teeth tonight she felt a sudden onset of sharp midsternal chest pain radiating to her back and then a severe pain to the back of her head \"like someone hit me with a baseball bat\" and subsequently passed out.  She was unsure how long she was passed out but denies any injury or trauma from the syncope.  She notified her  who took her to bed and patient had another episode of syncope lasting about 10 seconds.  She reports that her chest and back pain is better but she still has pretty severe pain to the back of her head.  She states that she has history of migraines but usually the pain is along the side of her head rather than in the back.  She also reports nausea but no vomiting.  She denies any visual change/weakness or sensory change.  She denies any fever since being discharged from the hospital.  She has had slight occasional cough at times.  She also reports sore throat.  She has history of chronic sinus congestion.  She also reports chronic diarrhea which is unchanged.  She denies any dysuria.  Patient states that she is allergic to almost all medicines except for Toradol and Phenergan for pain and nausea.  Patient reports that she has been taking all her medications as prescribed since discharge from the hospital.    REVIEW OF SYSTEMS  Please see HPI for pertinent positives/negatives.  All other systems reviewed and are negative.     PAST " MEDICAL HISTORY  Past Medical History:   Diagnosis Date   • AAA (abdominal aortic aneurysm) ([facility])    • Asthma    • Chronic pain    • Congestive heart failure ([facility])     Cardiologist, Dr. Carlson   • Fibromyalgia    • GERD (gastroesophageal reflux disease)    • Hypertension    • Migraine    • Osteoporosis    • Renal disorder     CKD   • Urticaria         SURGICAL HISTORY  Past Surgical History:   Procedure Laterality Date   • SHOULDER DECOMPRESSION ARTHROSCOPIC Left 2/19/2019    Procedure: SHOULDER DECOMPRESSION ARTHROSCOPIC - SUBACROMIAL;  Surgeon: Camila Elkins M.D.;  Location: SUR[facility]ERY [facility];  Service: Orthopedics   • CLAVICLE DISTAL EXCISION Left 2/19/2019    Procedure: CLAVICLE DISTAL EXCISION;  Surgeon: Camila Elkins M.D.;  Location: SURGERY [facility];  Service: Orthopedics   • SHOULDER ARTHROSCOPY W/ BICIPITAL TENODESIS REPAIR Left 2/19/2019    Procedure: SHOULDER ARTHROSCOPY W/ BICIPITAL TENODESIS REPAIR;  Surgeon: Camila Elkins M.D.;  Location: [facility];  Service: Orthopedics   • GASTROSCOPY N/A 2/7/2019    Procedure: GASTROSCOPY- DIALATION AND BIOPSY;  Surgeon: Hola Veliz M.D.;  Location: [facility];  Service: Gastroenterology   • ABDOMINAL EXPLORATION  2002   • GASTRIC BYPASS LAPAROSCOPIC  1999   • HYSTERECTOMY, TOTAL ABDOMINAL  1995   • APPENDECTOMY  1974        SOCIAL HISTORY  Social History     Tobacco Use   • Smoking status: Never Smoker   • Smokeless tobacco: Never Used   Substance and Sexual Activity   • Alcohol use: Yes     Frequency: 2-4 times a month     Drinks per session: 1 or 2     Binge frequency: Never     Comment: 1-2 /month    • Drug use: No   • Sexual activity: Not on file        FAMILY HISTORY  Family History   Problem Relation Age of Onset   • Heart Disease Mother    • Diabetes Mother    • Heart Failure Mother    • Hypertension Mother    • Hypertension Father    • Heart Disease Brother    • Heart Attack Brother   "  • Hypertension Brother         CURRENT MEDICATIONS  Home Medications    **Home medications have not yet been reviewed for this encounter**          ALLERGIES  Allergies   Allergen Reactions   • Bactrim [Sulfamethoxazole W-Trimethoprim] Shortness of Breath   • Bee Venom Anaphylaxis   • Econazole Anaphylaxis   • Floxin [Ofloxacin] Anaphylaxis, Shortness of Breath and Swelling     Cause throat swelling and difficulty breathing   • Gadolinium Derivatives Hives and Swelling     Throat swelling   • Iodine Shortness of Breath and Anaphylaxis     Throat and tongue swelling with IV contrast   • Keflex Shortness of Breath   • Levaquin Shortness of Breath     anaphlyaxis   • Levofloxacin Shortness of Breath   • Morphine Anaphylaxis   • Naloxone Hives     \"hives, SOB\"   • Nitrofurantoin Shortness of Breath     ...and hives     • Norco [Apap-Fd&C Yellow #10 Al Tai-Hydrocodone] Shortness of Breath     ...and hives     • Oxycodone Shortness of Breath     ...and hives     • Penicillins Shortness of Breath     ...and hives     • Tape Contact Dermatitis     Blisters, clear tegaderm ok.. No steristrips.   • Ancef [Cefazolin] Hives   • Bextra [Valdecoxib] Rash     \"Skin burn and peeling.\"   • Flagyl [Kdc:Metronidazole+Tartrazine] Hives   • Linezolid Rash     Rash all over body   • Other Drug Hives     \"Enconazole nitrate.\" shortness of breath and hives   • Other Misc Unspecified     Adhesive tape: blisters, skin peels off   • Buprenorphine Anaphylaxis   • Clindamycin      HIVES     • Tygacil [Tigecycline]      itching   • Zithromax [Azithromycin] Hives and Shortness of Breath     Pt had Hives also        PHYSICAL EXAM  VITAL SIGNS: /61   Pulse 94   Temp 36.5 °C (97.7 °F)   Resp 20   Ht 1.626 m (5' 4\")   Wt 83.5 kg (184 lb 1.4 oz)   LMP 02/07/2016 (Within Months)   SpO2 97%   BMI 31.60 kg/m²  @TIM[200176::@     Pulse ox interpretation: 96% I interpret this pulse ox as normal     Cardiac monitor interpretation: Sinus " rhythm with heart rate in the 70s as interpreted by me.  The patient presented with syncope and cardiac monitor was ordered to monitor for dysrhythmia.    Constitutional: Well developed, well nourished, alert in no apparent distress, nontoxic appearance    HENT: No external signs of trauma, normocephalic, oropharynx moist and clear, nose normal    Eyes: PERRL, EOMI, vision and visual fields are grossly intact bilaterally, conjunctiva without erythema, no discharge, no icterus    Neck: Soft and supple, trachea midline, no stridor, no tenderness, no LAD, no JVD, good ROM    Cardiovascular: Regular rate and rhythm, no murmurs/rubs/gallops, strong distal pulses and good perfusion    Thorax & Lungs: No respiratory distress, CTAB   Abdomen: Soft, nontender, nondistended, no guarding, no rebound, normal BS    Back: No CVAT    Extremities: No cyanosis, no edema, no gross deformity, good ROM, no tenderness, intact distal pulses with brisk cap refill    Skin: Warm, dry, mild pallor, no cyanosis, no rash noted    Lymphatic: No lymphadenopathy noted    Neuro: Alert and oriented to person, place, and time.  GCS 15.  CN II-XII grossly intact.  Normal speech.  Equal strength bilateral UE/LE.  Sensation intact to touch.  No cerebellar signs.   Psychiatric: Cooperative, normal mood and affect, normal judgement, appropriate for clinical situation        DIAGNOSTIC STUDIES / PROCEDURES    EKG  12 Lead EKG obtained at 2219 and interpreted by me:   Rate: 78   Rhythm: Sinus rhythm   Ectopy: None  Intervals: Normal   Axis: LAD  QRS: LVH  ST segments: Normal  T Waves: T wave inversion lateral leads    Clinical Impression: Sinus rhythm with LVH and T wave inversion lateral leads  Compared to November 5, 2019      LABS  All labs reviewed by me.     Results for orders placed or performed during the hospital encounter of 11/10/19   CBC WITH DIFFERENTIAL   Result Value Ref Range    WBC 11.2 (H) 4.8 - 10.8 K/uL    RBC 5.00 4.20 - 5.40 M/uL     Hemoglobin 13.9 12.0 - 16.0 g/dL    Hematocrit 44.2 37.0 - 47.0 %    MCV 88.4 81.4 - 97.8 fL    MCH 27.8 27.0 - 33.0 pg    MCHC 31.4 (L) 33.6 - 35.0 g/dL    RDW 47.6 35.9 - 50.0 fL    Platelet Count 282 164 - 446 K/uL    MPV 10.4 9.0 - 12.9 fL    Neutrophils-Polys 84.60 (H) 44.00 - 72.00 %    Lymphocytes 9.50 (L) 22.00 - 41.00 %    Monocytes 4.50 0.00 - 13.40 %    Eosinophils 0.50 0.00 - 6.90 %    Basophils 0.30 0.00 - 1.80 %    Immature Granulocytes 0.60 0.00 - 0.90 %    Nucleated RBC 0.00 /100 WBC    Neutrophils (Absolute) 9.48 (H) 2.00 - 7.15 K/uL    Lymphs (Absolute) 1.07 1.00 - 4.80 K/uL    Monos (Absolute) 0.51 0.00 - 0.85 K/uL    Eos (Absolute) 0.06 0.00 - 0.51 K/uL    Baso (Absolute) 0.03 0.00 - 0.12 K/uL    Immature Granulocytes (abs) 0.07 0.00 - 0.11 K/uL    NRBC (Absolute) 0.00 K/uL   COMP METABOLIC PANEL   Result Value Ref Range    Sodium 135 135 - 145 mmol/L    Potassium 4.2 3.6 - 5.5 mmol/L    Chloride 99 96 - 112 mmol/L    Co2 20 20 - 33 mmol/L    Anion Gap 16.0 (H) 0.0 - 11.9    Glucose 170 (H) 65 - 99 mg/dL    Bun 23 (H) 8 - 22 mg/dL    Creatinine 0.94 0.50 - 1.40 mg/dL    Calcium 8.5 8.4 - 10.2 mg/dL    AST(SGOT) 18 12 - 45 U/L    ALT(SGPT) 23 2 - 50 U/L    Alkaline Phosphatase 81 30 - 99 U/L    Total Bilirubin 0.3 0.1 - 1.5 mg/dL    Albumin 3.6 3.2 - 4.9 g/dL    Total Protein 6.1 6.0 - 8.2 g/dL    Globulin 2.5 1.9 - 3.5 g/dL    A-G Ratio 1.4 g/dL   proBrain Natriuretic Peptide, NT   Result Value Ref Range    NT-proBNP 1370 (H) 0 - 125 pg/mL   TROPONIN   Result Value Ref Range    Troponin T 18 6 - 19 ng/L   MAGNESIUM   Result Value Ref Range    Magnesium 1.7 1.5 - 2.5 mg/dL   ESTIMATED GFR   Result Value Ref Range    GFR If African American >60 >60 mL/min/1.73 m 2    GFR If Non African American >60 >60 mL/min/1.73 m 2   EKG (NOW)   Result Value Ref Range    Report       [facility] Emergency Dept.    Test Date:  2019-11-10  Pt Name:    CARMINA MORAN               Department: [facility]  MRN:        [id number]                      Room:       -ROOM 2  Gender:     Female                       Technician: SANJAY  :        1965                   Requested By:CONSTANCE WEBB  Order #:    [id number]                    Reading MD:    Measurements  Intervals                                Axis  Rate:       78                           P:          0  NM:         170                          QRS:        -6  QRSD:       94                           T:          120  QT:         377  QTc:        430    Interpretive Statements  Sinus rhythm  LVH w/ repol abnormalities, possible ischemia  Compared to ECG 2019 07:27:18  Possible ischemia now present  Intraventricular conduction delay no longer present  Early repolarization no longer present  Prolonged QT interval no longer present          RADIOLOGY  The radiologist's interpretation of all radiological studies have been reviewed by me.     CT-HEAD W/O   Final Result      No intracranial abnormality.               INTERPRETING LOCATION:  [address], [place], [address]      DX-CHEST-2 VIEWS   Final Result      Normal chest.               INTERPRETING LOCATION: [address], [place], [address]      DX-SHOULDER 2+ LEFT    (Results Pending)          COURSE & MEDICAL DECISION MAKING  Nursing notes, VS, PMSFHx reviewed in chart.     Review of past medical records shows the patient was admitted in this hospital 2019 for chest pain with nausea and vomiting and discharged on 2019.  During her hospital stay she underwent CTA chest which was negative for PE but did show edema.  Patient also with elevated BNP and troponin and cardiology was consulted.  Echo showed a ejection fraction of 20% and thought to be due to uncontrolled hypertension.  She was started on Coreg, Aldactone and losartan.      Differential diagnoses considered include but are not limited to: Syncope, near syncope, hypoglycemia, Sz, vasovagal episode, dysrhythmia, PE,  dehydration, electrolyte abnormality       2345: D/W Dr. Jung, hospitalist, who will admit patient      History and physical exam as above.  EKG showed sinus rhythm with LVH and new T wave inversion on lateral leads when compared to her previous.  Chest x-ray and CT head without evidence for acute abnormality.  Labs showed mild leukocytosis and hyperglycemia likely stress reaction.  Elevated BNP appears stable compared to her prior results and likely from her known history of CHF.  Patient presented with hypotension but given her history of CHF with ejection fraction of 20%, judicious IV fluid was initiated with subsequent improvement of her blood pressure.  No signs of worrisome dysrhythmia during her ED stay.  Patient was given aspirin for her chest pain and Phenergan for nausea at her request.  She subsequently was given acetaminophen for headache.  She is noted to be in no acute distress and nontoxic in appearance without focal neurological findings.  Findings discussed with patient and  and they are agreeable to admission given her risk factors.  Discussed with Dr. Jung who graciously agreed to admit patient for further care.      HYDRATION: Based on the patient's presentation of Hypotension the patient was given IV fluids. IV Hydration was used because oral hydration was not as rapid as required. Upon recheck following hydration, the patient was improved.     CRITICAL CARE NOTE:  Total critical care time spent on this patient was 30 minutes exclusive of separately billable procedures.  This may include direct bedside patient care, speaking with family members, review of past medical records, reviewing the results of laboratory/diagnostic studies, consulting with other physicians, as well as evaluating the response to the therapy instituted.        FINAL IMPRESSION  1. Syncope, unspecified syncope type Acute   2. Acute chest pain Acute   3. Acute nonintractable headache, unspecified headache type Acute    4. Hypotension, unspecified hypotension type Active   5. History of CHF (congestive heart failure) Active          DISPOSITION  Patient will be admitted by hospitalist for further care      Electronically signed by: Syd Hernández, 11/10/2019 10:26 PM      Portions of this record were made with voice recognition software.  Despite my review, spelling/grammar/context errors may still remain.  Interpretation of this chart should be taken in this context.

## 2019-11-11 NOTE — ASSESSMENT & PLAN NOTE
Suspect secondary to hypo-tension due to dehydration or due to current cardiac medications   After patient received 250 cc of IV fluid, her blood pressure did normalize.    At this point we will evaluate if we should decrease her carvedilol dose versus either discontinuing her home low dose Lasix or decreasing her Lasix from 20 mg to 10 mg daily?  Monitor closely on telemetry to make sure she did not have a bradycardic event  Orthostatics ordered  She just had an echocardiogram just last week. Will not repeat

## 2019-11-11 NOTE — PROGRESS NOTES
"[facility] Medicine Daily Progress Note    Date of Service  11/11/2019    Chief Complaint  54 y.o. female admitted 11/10/2019 for syncope.    Hospital Course    per HPI  54 y.o. female,  who presented to the hospital today on 11/10/2019 after 2 syncopal episodes at home.  Patient was in the bathroom brushing her teeth when initially passed out.  That was unwitnessed and when  came up she was waking up.  She had a second witnessed episode reason why he brought her to the emergency department.  Patient states that just prior to the event she had an acute onset of sharp chest pain, mid chest with radiation to her left shoulder and then \"just passed out \".  She thinks she hit the posterior aspect of her head even though no obvious trauma were noticed.  She is chest pain-free at this time however is still complains of left shoulder pain.  She has chronic pain in that shoulder and also underlying history of rotator cuff.  Denies shortness of breath or diaphoresis.     Recent admission: This patient was recently admitted here to [facility] from 11/4/2019 discharged 3 days ago on 11/7/2019 for evaluation of worsening systolic CHF.  She has an EF of 20% on 11/5/2019, therefore started on carvedilol and Aldactone in addition to losartan that she was already taking and Lasix.     ER COURSE:  -Patient was hypotensive upon arrival to the emergency department with blood pressure of 81/62.  Remained hypotensive in spite of 250 mL's IV bolus given in the ED.  -Laboratory showed BNP of 1370 and troponin of 18 ng/L.  -Her EKG is showing T wave inversions on anterior lateral leads.  -CT of the head was negative for acute findings.        Interval Problem Update  No acute events overnight, patient overall feeling much better.  She just had mild chills in the morning but overall feeling much better.  Denies any headaches, or chest pain.  At this point we are uncertain why she had syncope, either it was because of dehydration or " because of her hypertensive medications.  But we did make sure that we kept her an additional day on her last admission to make sure that her blood pressure was stable with these medication combinations.      Consultants/Specialty  None    Code Status  Full Code    Disposition  TBD    Review of Systems  Review of Systems   Constitutional: Positive for malaise/fatigue. Negative for chills and fever.   HENT: Negative for congestion, hearing loss, sore throat and tinnitus.    Eyes: Negative for blurred vision, double vision, photophobia and pain.   Respiratory: Negative for cough, hemoptysis, sputum production, shortness of breath and stridor.    Cardiovascular: Negative for chest pain, palpitations, orthopnea, claudication and PND.   Gastrointestinal: Negative for blood in stool, constipation, heartburn, melena, nausea and vomiting.   Genitourinary: Negative for dysuria, frequency and urgency.   Musculoskeletal: Negative for back pain, myalgias and neck pain.   Neurological: Negative for dizziness, tingling, tremors, sensory change, speech change, weakness and headaches.   Psychiatric/Behavioral: Negative for depression, memory loss and suicidal ideas. The patient is not nervous/anxious.    All other systems reviewed and are negative.       Physical Exam  Temp:  [36.5 °C (97.7 °F)-36.7 °C (98.1 °F)] 36.7 °C (98.1 °F)  Pulse:  [74-96] 76  Resp:  [18-26] 18  BP: ()/(47-76) 136/76  SpO2:  [94 %-100 %] 99 %    Physical Exam  Vitals signs and nursing note reviewed.   Constitutional:       Appearance: Normal appearance. She is obese.   HENT:      Head: Normocephalic and atraumatic.      Right Ear: Tympanic membrane normal.      Nose: No congestion.      Mouth/Throat:      Mouth: Mucous membranes are moist.      Pharynx: Oropharynx is clear. No oropharyngeal exudate or posterior oropharyngeal erythema.   Eyes:      Extraocular Movements: Extraocular movements intact.      Conjunctiva/sclera: Conjunctivae normal.       Pupils: Pupils are equal, round, and reactive to light.   Neck:      Musculoskeletal: Normal range of motion and neck supple. No neck rigidity.   Cardiovascular:      Rate and Rhythm: Normal rate and regular rhythm.      Pulses: Normal pulses.      Heart sounds: No murmur.   Pulmonary:      Effort: Pulmonary effort is normal. No respiratory distress.      Breath sounds: Normal breath sounds. No stridor. No wheezing, rhonchi or rales.   Abdominal:      General: Abdomen is flat. Bowel sounds are normal. There is no distension.      Palpations: Abdomen is soft. There is no mass.      Tenderness: There is no tenderness. There is no guarding.      Hernia: No hernia is present.   Musculoskeletal: Normal range of motion.         General: No swelling or tenderness.   Skin:     General: Skin is warm and dry.      Capillary Refill: Capillary refill takes less than 2 seconds.   Neurological:      General: No focal deficit present.      Mental Status: She is alert and oriented to person, place, and time. Mental status is at baseline.      Cranial Nerves: No cranial nerve deficit.   Psychiatric:         Mood and Affect: Mood normal.         Thought Content: Thought content normal.         Fluids  No intake or output data in the 24 hours ending 11/11/19 0857    Laboratory  Recent Labs     11/10/19  2247   WBC 11.2*   RBC 5.00   HEMOGLOBIN 13.9   HEMATOCRIT 44.2   MCV 88.4   MCH 27.8   MCHC 31.4*   RDW 47.6   PLATELETCT 282   MPV 10.4     Recent Labs     11/10/19  2247 11/11/19  0506   SODIUM 135 137   POTASSIUM 4.2 3.6   CHLORIDE 99 99   CO2 20 23   GLUCOSE 170* 119*   BUN 23* 30*   CREATININE 0.94 1.18   CALCIUM 8.5 9.0                   Imaging  CT-HEAD W/O   Final Result      No intracranial abnormality.               INTERPRETING LOCATION:  [address] [facility], [address]      DX-CHEST-2 VIEWS   Final Result      Normal chest.               INTERPRETING LOCATION: [facility], [address]      DX-SHOULDER 2+ LEFT    (Results  Pending)        Assessment/Plan  * Syncope  Assessment & Plan  Suspect secondary to hypo-tension due to dehydration or due to current cardiac medications   After patient received 250 cc of IV fluid, her blood pressure did normalize.    At this point we will evaluate if we should decrease her carvedilol dose versus either discontinuing her home low dose Lasix or decreasing her Lasix from 20 mg to 10 mg daily?  Monitor closely on telemetry to make sure she did not have a bradycardic event  Orthostatics ordered  She just had an echocardiogram just last week. Will not repeat     Hypotension  Assessment & Plan  Patient was hypotensive on arrival, systolic blood pressure in the 80s  Suspect dehydration which can be a combination with her being placed on new medications including carvedilol, losartan and Aldactone.  Given that patient has a low LVEF of 20%  Currently a blood pressure is within normal parameters.  I will avoid excessive IV fluids as last time she was hypoxemic and needed IV Lasix for diuresis.  We will monitor her closely and see if she needs adjustments in her carvedilol, and perhaps backing off on lasix from 20mg at home to 10mg? Or holding off.   Will continue to follow today.      Atypical chest pain  Assessment & Plan  Chest pain preceded syncopal episode.  EKG does show few abnormalities including T wave inversions,  Patient just had a recent echocardiogram and had cardiology evaluation.  At this present time will trend troponins x3  Denies active chest pain currently.     Pain in joint of left shoulder- (present on admission)  Assessment & Plan  Hx of chronic left shoulder pain. Now 2/2 to fall.  Xrays neg.  Pain control PRN      Chronic systolic heart failure (HCC)  Assessment & Plan  Diagnosed on last admission, she is not in acute exacerbation  Despite elevated BNP, clinically she appears dry      Hypothyroidism- (present on admission)  Assessment & Plan  -She takes levothyroxine and Cytomel.  I  will continue home medication at this time.    Depression  Assessment & Plan  -Stable with no active recurrent episode.  Continue duloxetine.    Hx of gastric bypass in 2009 Decmber - (present on admission)  Assessment & Plan  -Plan as above      In addition to the admission E/M time, I have spent a total time face to face with this patient was about 33 mins 830am-9:03am, of which 60% of time was spent on counseling, review of records including pertinent lab data and studies, as well as discussing diagnostic evaluation and work up, planned therapeutic interventions and future disposition of care. Where indicated, the assessment and plan reflect discussion of patient with consultants, other healthcare providers, family members, and additional research needed to obtain further information in formulating the plan of care of this patient.          VTE prophylaxis: Lovenox

## 2019-11-11 NOTE — PROGRESS NOTES
Telemetry Shift Summary     Rhythm SR  HR Range 70-79  Ectopy Rare PVC  Measurements 0.20/0.08/0.36           Normal Values  Rhythm SR  HR Range    Measurements 0.12-0.20 / 0.06-0.10  / 0.30-0.52

## 2019-11-11 NOTE — ASSESSMENT & PLAN NOTE
Chest pain preceded syncopal episode.  EKG does show few abnormalities including T wave inversions,  Patient just had a recent echocardiogram and had cardiology evaluation.  At this present time will trend troponins x3  Denies active chest pain currently.

## 2019-11-11 NOTE — ED TRIAGE NOTES
Chief Complaint   Patient presents with   • Chest Pain     radiates to back   • Syncope     2 syncopal events, second one witness by spouse. pt had LOC for about 10 sec, per spouse.      Pt is alert and oriented, pt complains of CP,denies SOB. Pt also complains of posterior headache and nausea. ERP at bedside and aware of BP.

## 2019-11-12 VITALS
WEIGHT: 178.57 LBS | RESPIRATION RATE: 18 BRPM | OXYGEN SATURATION: 97 % | HEIGHT: 64 IN | TEMPERATURE: 97.5 F | HEART RATE: 77 BPM | SYSTOLIC BLOOD PRESSURE: 140 MMHG | BODY MASS INDEX: 30.49 KG/M2 | DIASTOLIC BLOOD PRESSURE: 88 MMHG

## 2019-11-12 LAB
ALBUMIN SERPL BCP-MCNC: 3.5 G/DL (ref 3.2–4.9)
ALBUMIN/GLOB SERPL: 1.5 G/DL
ALP SERPL-CCNC: 66 U/L (ref 30–99)
ALT SERPL-CCNC: 21 U/L (ref 2–50)
ANION GAP SERPL CALC-SCNC: 16 MMOL/L (ref 0–11.9)
AST SERPL-CCNC: 24 U/L (ref 12–45)
BASOPHILS # BLD AUTO: 0.3 % (ref 0–1.8)
BASOPHILS # BLD: 0.02 K/UL (ref 0–0.12)
BILIRUB SERPL-MCNC: 0.3 MG/DL (ref 0.1–1.5)
BUN SERPL-MCNC: 19 MG/DL (ref 8–22)
CALCIUM SERPL-MCNC: 8.7 MG/DL (ref 8.4–10.2)
CHLORIDE SERPL-SCNC: 104 MMOL/L (ref 96–112)
CO2 SERPL-SCNC: 18 MMOL/L (ref 20–33)
CREAT SERPL-MCNC: 0.85 MG/DL (ref 0.5–1.4)
EOSINOPHIL # BLD AUTO: 0.23 K/UL (ref 0–0.51)
EOSINOPHIL NFR BLD: 3.4 % (ref 0–6.9)
ERYTHROCYTE [DISTWIDTH] IN BLOOD BY AUTOMATED COUNT: 47.7 FL (ref 35.9–50)
GLOBULIN SER CALC-MCNC: 2.3 G/DL (ref 1.9–3.5)
GLUCOSE SERPL-MCNC: 110 MG/DL (ref 65–99)
HCT VFR BLD AUTO: 41.5 % (ref 37–47)
HGB BLD-MCNC: 13.1 G/DL (ref 12–16)
IMM GRANULOCYTES # BLD AUTO: 0.07 K/UL (ref 0–0.11)
IMM GRANULOCYTES NFR BLD AUTO: 1 % (ref 0–0.9)
LYMPHOCYTES # BLD AUTO: 1.45 K/UL (ref 1–4.8)
LYMPHOCYTES NFR BLD: 21.7 % (ref 22–41)
MCH RBC QN AUTO: 28.1 PG (ref 27–33)
MCHC RBC AUTO-ENTMCNC: 31.6 G/DL (ref 33.6–35)
MCV RBC AUTO: 88.9 FL (ref 81.4–97.8)
MONOCYTES # BLD AUTO: 0.71 K/UL (ref 0–0.85)
MONOCYTES NFR BLD AUTO: 10.6 % (ref 0–13.4)
NEUTROPHILS # BLD AUTO: 4.2 K/UL (ref 2–7.15)
NEUTROPHILS NFR BLD: 63 % (ref 44–72)
NRBC # BLD AUTO: 0 K/UL
NRBC BLD-RTO: 0 /100 WBC
PLATELET # BLD AUTO: 240 K/UL (ref 164–446)
PMV BLD AUTO: 10.8 FL (ref 9–12.9)
POTASSIUM SERPL-SCNC: 3.5 MMOL/L (ref 3.6–5.5)
PROT SERPL-MCNC: 5.8 G/DL (ref 6–8.2)
RBC # BLD AUTO: 4.67 M/UL (ref 4.2–5.4)
SODIUM SERPL-SCNC: 138 MMOL/L (ref 135–145)
WBC # BLD AUTO: 6.7 K/UL (ref 4.8–10.8)

## 2019-11-12 PROCEDURE — 80053 COMPREHEN METABOLIC PANEL: CPT

## 2019-11-12 PROCEDURE — 85025 COMPLETE CBC W/AUTO DIFF WBC: CPT

## 2019-11-12 PROCEDURE — A9270 NON-COVERED ITEM OR SERVICE: HCPCS | Performed by: HOSPITALIST

## 2019-11-12 PROCEDURE — 700102 HCHG RX REV CODE 250 W/ 637 OVERRIDE(OP): Performed by: HOSPITALIST

## 2019-11-12 PROCEDURE — G0378 HOSPITAL OBSERVATION PER HR: HCPCS

## 2019-11-12 PROCEDURE — 99217 PR OBSERVATION CARE DISCHARGE: CPT | Performed by: HOSPITALIST

## 2019-11-12 RX ORDER — CARVEDILOL 12.5 MG/1
6.25 TABLET ORAL 2 TIMES DAILY WITH MEALS
Qty: 60 TAB | Refills: 1 | Status: SHIPPED | OUTPATIENT
Start: 2019-11-12 | End: 2020-01-02

## 2019-11-12 RX ADMIN — CEVIMELINE HYDROCHLORIDE 30 MG: 30 CAPSULE ORAL at 06:00

## 2019-11-12 RX ADMIN — ACETAMINOPHEN 1000 MG: 500 TABLET, FILM COATED ORAL at 07:39

## 2019-11-12 RX ADMIN — LOSARTAN POTASSIUM 25 MG: 25 TABLET ORAL at 05:16

## 2019-11-12 RX ADMIN — POTASSIUM CHLORIDE 20 MEQ: 1500 TABLET, EXTENDED RELEASE ORAL at 05:15

## 2019-11-12 RX ADMIN — CARVEDILOL 6.25 MG: 6.25 TABLET, FILM COATED ORAL at 07:37

## 2019-11-12 RX ADMIN — CEVIMELINE HYDROCHLORIDE 30 MG: 30 CAPSULE ORAL at 12:00

## 2019-11-12 RX ADMIN — SPIRONOLACTONE 25 MG: 25 TABLET ORAL at 05:16

## 2019-11-12 RX ADMIN — ESTRADIOL 0.5 MG: 1 TABLET ORAL at 05:15

## 2019-11-12 RX ADMIN — FUROSEMIDE 10 MG: 20 TABLET ORAL at 05:16

## 2019-11-12 RX ADMIN — CETIRIZINE HYDROCHLORIDE 20 MG: 10 TABLET, FILM COATED ORAL at 05:15

## 2019-11-12 NOTE — DISCHARGE SUMMARY
Discharge Summary    CHIEF COMPLAINT ON ADMISSION  Chief Complaint   Patient presents with   • Chest Pain     radiates to back   • Syncope     2 syncopal events, second one witness by spouse. pt had LOC for about 10 sec, per spouse.        Reason for Admission  Post op comp/shorntess of breath     Admission Date  11/10/2019    CODE STATUS  Prior    HPI & HOSPITAL COURSE  This is a 54 y.o. female here with syncope. She had recently been admitted and treated for CHF. She was noted to have orthostatic hypotension here and improved with IV fluids.   In addition she was bradycardic. Her beta blockers were held and she continued to improve. These will be held for now and she will follow up with cardiology and her PCP. She may be able to tolerate a low dose of coreg in the future.        Therefore, she is discharged in good and stable condition to home with close outpatient follow-up.    The patient recovered much more quickly than anticipated on admission.    Discharge Date  11/12/2019    FOLLOW UP ITEMS POST DISCHARGE  none    DISCHARGE DIAGNOSES  Principal Problem:    Syncope POA: Unknown      Overview: Overview:       Likely due to hypotension  Active Problems:    Atypical chest pain POA: Unknown    Hypotension POA: Unknown    Chronic systolic heart failure (HCC) POA: Unknown    Pain in joint of left shoulder POA: Yes    Hx of gastric bypass in 2009 Decmber  POA: Yes      Overview: Max weight > 300 lbs    Depression POA: Unknown    Hypothyroidism POA: Yes  Resolved Problems:    * No resolved hospital problems. *      FOLLOW UP  Future Appointments   Date Time Provider Department Center   11/13/2019 10:00 AM Elizabeth Lynn M.D. [facility] None   11/15/2019  2:00 PM Adriana Carlson M.D. [facility] None   11/26/2019  1:20 PM Martha Elmore M.D. [facility] None     Benson Ramirez M.D.  [address] [facility] [address]  [place] [address]  [phone]          Benson Ramirez M.D.  [address]  [facility] [address]  [place]  [address]  [phone]            MEDICATIONS ON DISCHARGE     Medication List      CHANGE how you take these medications      Instructions   carvedilol 12.5 MG Tabs  What changed:  how much to take  Commonly known as:  COREG   Take 0.5 Tabs by mouth 2 times a day, with meals.  Dose:  6.25 mg        CONTINUE taking these medications      Instructions   acetaminophen 500 MG Tabs  Commonly known as:  TYLENOL   Take 1,000 mg by mouth every 6 hours as needed for Moderate Pain.  Dose:  1,000 mg     AIMOVIG 70 MG/ML Soaj  Generic drug:  Erenumab   1 mL by Injection route Q30 DAYS.  Dose:  1 mL     B-12 1000 MCG/ML Kit   1,000 mcg by Injection route every 7 days. On Tue  Dose:  1,000 mcg     Biotin 5000 MCG Caps   Take 1 Cap by mouth every day.  Dose:  1 Cap     calcitonin (salmon) 200 UNIT/ACT Soln  Commonly known as:  MIACALCIN   Spray 1 Spray in nose every day.  Dose:  1 Spray     cevimeline 30 MG capsule  Commonly known as:  EVOXAC   Take 1 Cap by mouth 3 times a day.  Dose:  30 mg     colesevelam 625 MG Tabs  Commonly known as:  WELCHOL   Take 625 mg by mouth 3 times a day, with meals.  Dose:  625 mg     Coral Calcium 1000 (390 Ca) MG Tabs   Take 1,000 mg by mouth every evening.  Dose:  1,000 mg     * DULoxetine 60 MG Cpep delayed-release capsule  Commonly known as:  CYMBALTA   Take 60 mg by mouth every evening.  Dose:  60 mg     * DULoxetine 60 MG Cpep delayed-release capsule  Commonly known as:  CYMBALTA   Take 1 Cap by mouth every day.  Dose:  60 mg     EPINEPHrine 0.3 MG/0.3ML Soaj solution for injection  Commonly known as:  EPIPEN   0.3 mg by Intramuscular route Once. Indications: Life-Threatening Hypersensitivity Reaction  Dose:  0.3 mg     estradiol 0.5 MG tablet  Commonly known as:  ESTRACE   Take 0.5 mg by mouth every day.  Dose:  0.5 mg     FARXIGA 5 MG Tabs  Generic drug:  Dapagliflozin Propanediol   Take 5 mg by mouth every evening.  Dose:  5 mg     FROVA 2.5 MG Tabs  Generic drug:  Frovatriptan  Succinate   Take 2.5 mg by mouth as needed (For migraines).  Dose:  2.5 mg     furosemide 20 MG Tabs  Commonly known as:  LASIX   TAKE 0.5 TABS BY MOUTH 2 TIMES A DAY.  Dose:  10 mg     gabapentin 400 MG Caps  Commonly known as:  NEURONTIN   Take 1 Cap by mouth 3 times a day.  Dose:  400 mg     hydrOXYzine HCl 25 MG Tabs  Commonly known as:  ATARAX   Take 25 mg by mouth every bedtime. For sleep  Dose:  25 mg     KLS ALLER-HAWA 10 MG Tabs  Generic drug:  cetirizine   Take 20 mg by mouth 2 times a day.  Dose:  20 mg     liothyronine 25 MCG Tabs  Commonly known as:  CYTOMEL   Take 25 mcg by mouth every evening.  Dose:  25 mcg     magnesium oxide 400 MG Tabs  Commonly known as:  MAG-OX   Take 400 mg by mouth every evening.  Dose:  400 mg     MOBIC 15 MG tablet  Generic drug:  meloxicam   Take 15 mg by mouth every evening.  Dose:  15 mg     montelukast 10 MG Tabs  Commonly known as:  SINGULAIR   Take 10 mg by mouth every evening.  Dose:  10 mg     multivitamin Tabs   Take 1 Tab by mouth every day.  Dose:  1 Tab     pantoprazole 40 MG Tbec  Commonly known as:  PROTONIX   Take 40 mg by mouth every evening.  Dose:  40 mg     potassium chloride SA 20 MEQ Tbcr  Commonly known as:  Kdur   Take 1 Tab by mouth every day.  Dose:  20 mEq     predniSONE 20 MG Tabs  Commonly known as:  DELTASONE   Take 20 mg by mouth every day.  Dose:  20 mg     spironolactone 25 MG Tabs  Commonly known as:  ALDACTONE   Take 1 Tab by mouth every day.  Dose:  25 mg     SYNTHROID 75 MCG Tabs  Generic drug:  levothyroxine   Take 75 mcg by mouth every evening.  Dose:  75 mcg     tizanidine 4 MG Tabs  Commonly known as:  ZANAFLEX   Take 2-4 mg by mouth every 6 hours as needed. Indications: Muscle Spasticity  Dose:  2-4 mg     Vitamin D 2000 units Caps   Take 2,000 Units by mouth every evening.  Dose:  2,000 Units     Vitamin K2 100 MCG Caps   Take 1 Tab by mouth every day.  Dose:  1 Tab         * This list has 2 medication(s) that are the same as other  "medications prescribed for you. Read the directions carefully, and ask your doctor or other care provider to review them with you.            STOP taking these medications    losartan 25 MG Tabs  Commonly known as:  COZAAR     PROBIOTIC PO            Allergies  Allergies   Allergen Reactions   • Bactrim [Sulfamethoxazole W-Trimethoprim] Shortness of Breath   • Bee Venom Anaphylaxis   • Econazole Anaphylaxis   • Floxin [Ofloxacin] Anaphylaxis, Shortness of Breath and Swelling     Cause throat swelling and difficulty breathing   • Gadolinium Derivatives Hives and Swelling     Throat swelling   • Iodine Shortness of Breath and Anaphylaxis     Throat and tongue swelling with IV contrast   • Keflex Shortness of Breath   • Levaquin Shortness of Breath     anaphlyaxis   • Levofloxacin Shortness of Breath   • Morphine Anaphylaxis   • Naloxone Hives     \"hives, SOB\"   • Nitrofurantoin Shortness of Breath     ...and hives     • Norco [Apap-Fd&C Yellow #10 Al Tai-Hydrocodone] Shortness of Breath     ...and hives     • Oxycodone Shortness of Breath     ...and hives     • Penicillins Shortness of Breath     ...and hives     • Tape Contact Dermatitis     Blisters, clear tegaderm ok.. No steristrips.   • Ancef [Cefazolin] Hives   • Bextra [Valdecoxib] Rash     \"Skin burn and peeling.\"   • Flagyl [Kdc:Metronidazole+Tartrazine] Hives   • Linezolid Rash     Rash all over body   • Other Drug Hives     \"Enconazole nitrate.\" shortness of breath and hives   • Other Misc Unspecified     Adhesive tape: blisters, skin peels off   • Buprenorphine Anaphylaxis   • Clindamycin      HIVES     • Tygacil [Tigecycline]      itching   • Zithromax [Azithromycin] Hives and Shortness of Breath     Pt had Hives also       DIET  No orders of the defined types were placed in this encounter.      ACTIVITY  As tolerated.  Weight bearing as tolerated    CONSULTATIONS  none    PROCEDURES  none    LABORATORY  Lab Results   Component Value Date    SODIUM 138 " 11/12/2019    POTASSIUM 3.5 (L) 11/12/2019    CHLORIDE 104 11/12/2019    CO2 18 (L) 11/12/2019    GLUCOSE 110 (H) 11/12/2019    BUN 19 11/12/2019    CREATININE 0.85 11/12/2019        Lab Results   Component Value Date    WBC 6.7 11/12/2019    HEMOGLOBIN 13.1 11/12/2019    HEMATOCRIT 41.5 11/12/2019    PLATELETCT 240 11/12/2019        Total time of the discharge process exceeds 33 minutes.

## 2019-11-12 NOTE — CARE PLAN
Problem: Communication  Goal: The ability to communicate needs accurately and effectively will improve  Outcome: PROGRESSING AS EXPECTED   A&Ox4, able to make needs known.      Problem: Safety  Goal: Will remain free from injury  Outcome: PROGRESSING AS EXPECTED   SBA in room, steady gait, denies CP, dizziness, and SOB. BP > 100 systolic today. Intermittent headache well controlled with tylenol. Alarms on for safety, supportive  at bedside.      Problem: Venous Thromboembolism (VTW)/Deep Vein Thrombosis (DVT) Prevention:  Goal: Patient will participate in Venous Thrombosis (VTE)/Deep Vein Thrombosis (DVT)Prevention Measures  Outcome: PROGRESSING AS EXPECTED   SCD's while in bed. Ambulating.       Problem: Bowel/Gastric:  Goal: Normal bowel function is maintained or improved  Outcome: PROGRESSING AS EXPECTED   LBM: 11/12. Denies n/v.

## 2019-11-12 NOTE — PROGRESS NOTES
Telemetry Shift Summary    Rhythm SR  HR Range 70-90s  Ectopy  Per [facility]: rare PVC    Measurements for strip printed 11/11/2019 at 1850:   HR 97     0.20/0.10/0.32        Normal Values  Rhythm SR  HR Range    Measurements 0.12-0.20 / 0.06-0.10  / 0.30-0.52

## 2019-11-12 NOTE — PROGRESS NOTES
Discharge Instructions    Discussed discharge instructions. Tele and IV D/C. Belongings gathered and given to pt upon leaving the unit. Discharged home with  via car. All questions and concerns addressed.

## 2019-11-12 NOTE — CARE PLAN
Problem: Communication  Goal: The ability to communicate needs accurately and effectively will improve  Outcome: PROGRESSING AS EXPECTED  Note:   AO4 able to verbalize needs clearly. Demonstrates appropriate use of call light.        Problem: Safety  Goal: Will remain free from falls  Outcome: PROGRESSING AS EXPECTED  Intervention: Assess risk factors for falls  Flowsheets  Taken 11/11/2019 2100  Pt Calls for Assistance: Yes  Taken 11/11/2019 2333  Fall Risk: Risk to Fall -  0 - 1 point  History of fall: 2  Mobility Status Assessment: 0-Ambulates & Transfers Independently. No Assistance Required  Risk for Injury-Any positive answers results in the pt being at high risk for fall related injury: Not Applicable  Intervention: Implement fall precautions  Flowsheets  Taken 11/11/2019 2333  Bed Alarm: Yes - Alarm On  IV Pole on Same Side of Bed as Bathroom: Yes  Taken 11/11/2019 2100  Environmental Precautions: Treaded Slipper Socks on Patient;Personal Belongings, Wastebasket, Call Bell etc. in Easy Reach;Report Given to Other Health Care Providers Regarding Fall Risk;Bed in Low Position;Communication Sign for Patients & Families;Mobility Assessed & Appropriate Sign Placed

## 2019-11-12 NOTE — DISCHARGE INSTRUCTIONS
Discharge Instructions per Eligio Saleh M.D.        DIET: low salt    ACTIVITY: as tolerated    DIAGNOSIS:  Hypotension, syncope    Return to ER if you have a return of symptoms, pain, pass out, short of breath.     Discharge Instructions    Discharged to home by car with relative. Discharged via wheelchair, hospital escort: Yes.  Special equipment needed: Not Applicable    Be sure to schedule a follow-up appointment with your primary care doctor or any specialists as instructed.     Discharge Plan:   Influenza Vaccine Indication: Not indicated: Previously immunized this influenza season and > 8 years of age    I understand that a diet low in cholesterol, fat, and sodium is recommended for good health. Unless I have been given specific instructions below for another diet, I accept this instruction as my diet prescription.     Special Instructions:     HF Patient Discharge Instructions (not acute CHF this admission)  · Monitor your weight daily, and maintain a weight chart, to track your weight changes.   · Activity as tolerated, unless your Doctor has ordered otherwise.  · Follow a low fat, low cholesterol, low salt diet unless instructed otherwise by your Doctor. Read the labels on the back of food products and track your intake of fat, cholesterol and salt.   · Fluid Restriction No. If a Fluid Restriction has been ordered by your Doctor, measure fluids with a measuring cup to ensure that you are not exceeding the restriction.   · No smoking.  · Oxygen No. If your Doctor has ordered that you wear Oxygen at home, it is important to wear it as ordered.  · Did you receive an explanation from staff on the importance of taking each of your medications and why it is necessary to stay on the medications the physician/care provider has ordered? Yes  · Do you have any questions concerning how to manage your heart failure and what to do should you have any increased signs and symptoms after you go home? Yes  · Do you  feel like your heart failure care team involved you in the care treatment plan and allowed you to make decisions regarding your care while in the hospital and addressed any discharge needs you might have? Yes    See the educational handout provided at discharge for more information on monitoring your daily weight, activity and diet. This also explains more about Heart Failure, symptoms of a flare-up and some of the tests that you have undergone.     Warning Signs of a Flare-Up include:  · Swelling in the ankles or lower legs.  · Shortness of breath, while at rest, or while doing normal activities.   · Shortness of breath at night when in bed, or coughing in bed.   · Requiring more pillows to sleep at night, or needing to sit up at night to sleep.  · Feeling weak, dizzy or fatigued.     When to call your Doctor:  · Call [facility] about questions regarding the discharge instructions you were given [phone].  (Discharge Unit 3rd floor)  · Call your Primary Care Physician or Cardiologist if:   1. You experience any pain radiating to your jaw or neck.  2. You have any difficulty breathing.  3. You experience weight gain of 2 lbs in a day or 5 lbs in a week.   4. You feel any palpitations or irregular heartbeats.  5. You become dizzy or lose consciousness.   If you have had an angiogram or had a pacemaker or AICD placed, and experience:  1. Bleeding, drainage or swelling at the surgical / puncture site.  2. Fever greater than 100.0 F  3. Shock from internal defibrillator.  4. Cool and / or numb extremities.      · Is patient discharged on Warfarin / Coumadin?   No     Depression / Suicide Risk    As you are discharged from this [facility], it is important to learn how to keep safe from harming yourself.    Recognize the warning signs:  · Abrupt changes in personality, positive or negative- including increase in energy   · Giving away possessions  · Change in eating patterns-  significant weight changes-  positive or negative  · Change in sleeping patterns- unable to sleep or sleeping all the time   · Unwillingness or inability to communicate  · Depression  · Unusual sadness, discouragement and loneliness  · Talk of wanting to die  · Neglect of personal appearance   · Rebelliousness- reckless behavior  · Withdrawal from people/activities they love  · Confusion- inability to concentrate     If you or a loved one observes any of these behaviors or has concerns about self-harm, here's what you can do:  · Talk about it- your feelings and reasons for harming yourself  · Remove any means that you might use to hurt yourself (examples: pills, rope, extension cords, firearm)  · Get professional help from the community (Mental Health, Substance Abuse, psychological counseling)  · Do not be alone:Call your Safe Contact- someone whom you trust who will be there for you.  · Call your local CRISIS HOTLINE 837-9082 or 056-929-5312  · Call your local Children's Mobile Crisis Response Team [place] (568) 517-5213 or www.[employer]Ste[employer].com  · Call the toll free National Suicide Prevention Hotlines   · National Suicide Prevention Lifeline 555-804-BOOG (4810)  · National Hope Line Network 800-SUICIDE (608-9660)

## 2019-11-12 NOTE — PROGRESS NOTES
1900: Received report from ebonie Ferrell RN. Pt status and POC discussed. Pt is in bed with no signs of distress, calm with unlabored breathing. Denies any needs at this time. Fall precautions in place. Call light within reach.   2100: Due meds given. Shift routine and POC discussed.   0515: Due meds given. BP stable overnight. No issues occurred.   0700: Report given to AILYN Ferrell.

## 2019-11-15 ENCOUNTER — OFFICE VISIT (OUTPATIENT)
Dept: CARDIOLOGY | Facility: MEDICAL CENTER | Age: 54
End: 2019-11-15
Payer: MEDICARE

## 2019-11-15 VITALS
HEIGHT: 64 IN | OXYGEN SATURATION: 95 % | WEIGHT: 177 LBS | DIASTOLIC BLOOD PRESSURE: 118 MMHG | SYSTOLIC BLOOD PRESSURE: 156 MMHG | HEART RATE: 104 BPM | BODY MASS INDEX: 30.22 KG/M2

## 2019-11-15 DIAGNOSIS — I42.0 DILATED CARDIOMYOPATHY (HCC): ICD-10-CM

## 2019-11-15 DIAGNOSIS — I50.20 HEART FAILURE, ACC/AHA STAGE C WITH DECREASED EJECTION FRACTION (HCC): ICD-10-CM

## 2019-11-15 DIAGNOSIS — I10 ESSENTIAL HYPERTENSION: ICD-10-CM

## 2019-11-15 PROCEDURE — 99214 OFFICE O/P EST MOD 30 MIN: CPT | Performed by: INTERNAL MEDICINE

## 2019-11-15 ASSESSMENT — ENCOUNTER SYMPTOMS
VOMITING: 0
MYALGIAS: 0
PALPITATIONS: 0
NAUSEA: 0
SHORTNESS OF BREATH: 1
DIZZINESS: 0

## 2019-11-15 NOTE — PROGRESS NOTES
"Chief Complaint   Patient presents with   • Cardiomyopathy     hospital follow up       Subjective:   Donna Isaac is a 54 y.o. female who presents today f/u recent admission    Admitted earlier this month for HF 5 days after a few days of fever.  She stated that she also started on a new medication to \"boost her immune right before that\"    ECHO showed EF of 20%.Started on carvedilol  Told to stop losartan after readmitted for syncope a few days later, no warning especially palpitations  Also told to reduce carvedilol from 12.5 to 6.25 mg BID.  Had NL EF on ECHO last September and in April  Also had acute CHF 2018    BP has been somenwhat labile  Some atypical chest pain.  No longer having any dizziness.    MPI 2019 negative    Cath in [place]  for chest pain and was diagnosed with Takotsubo cardiomyopathy    Mother  unexpectedly from her heart at 49  She has identical twin brother, one  at 48 from CAD    Past Medical History:   Diagnosis Date   • AAA (abdominal aortic aneurysm) ([facility])    • Asthma    • Chronic pain    • Congestive heart failure ([facility])     Cardiologist, Dr. Carlson   • Fibromyalgia    • GERD (gastroesophageal reflux disease)    • Hypertension    • Migraine    • Osteoporosis    • Renal disorder     CKD   • Urticaria      Past Surgical History:   Procedure Laterality Date   • SHOULDER DECOMPRESSION ARTHROSCOPIC Left 2019    Procedure: SHOULDER DECOMPRESSION ARTHROSCOPIC - SUBACROMIAL;  Surgeon: Camila Elkins M.D.;  Location: [facility];  Service: Orthopedics   • CLAVICLE DISTAL EXCISION Left 2019    Procedure: CLAVICLE DISTAL EXCISION;  Surgeon: Camila Elkins M.D.;  Location: [facility];  Service: Orthopedics   • SHOULDER ARTHROSCOPY W/ BICIPITAL TENODESIS REPAIR Left 2019    Procedure: SHOULDER ARTHROSCOPY W/ BICIPITAL TENODESIS REPAIR;  Surgeon: Camila Elkins M.D.;  Location: [facility];  " Service: Orthopedics   • GASTROSCOPY N/A 2/7/2019    Procedure: GASTROSCOPY- DIALATION AND BIOPSY;  Surgeon: Hola Veliz M.D.;  Location: [facility];  Service: Gastroenterology   • ABDOMINAL EXPLORATION  2002   • GASTRIC BYPASS LAPAROSCOPIC  1999   • HYSTERECTOMY, TOTAL ABDOMINAL  1995   • APPENDECTOMY  1974     Family History   Problem Relation Age of Onset   • Heart Disease Mother    • Diabetes Mother    • Heart Failure Mother    • Hypertension Mother    • Hypertension Father    • Heart Disease Brother    • Heart Attack Brother    • Hypertension Brother      Social History     Socioeconomic History   • Marital status:      Spouse name: Not on file   • Number of children: Not on file   • Years of education: Not on file   • Highest education level: Not on file   Occupational History   • Not on file   Social Needs   • Financial resource strain: Not on file   • Food insecurity:     Worry: Never true     Inability: Never true   • Transportation needs:     Medical: No     Non-medical: No   Tobacco Use   • Smoking status: Never Smoker   • Smokeless tobacco: Never Used   Substance and Sexual Activity   • Alcohol use: Yes     Frequency: 2-4 times a month     Drinks per session: 1 or 2     Binge frequency: Never     Comment: 1-2 /month    • Drug use: No   • Sexual activity: Not on file   Lifestyle   • Physical activity:     Days per week: Not on file     Minutes per session: Not on file   • Stress: Not on file   Relationships   • Social connections:     Talks on phone: Not on file     Gets together: Not on file     Attends Synagogue service: Not on file     Active member of club or organization: Not on file     Attends meetings of clubs or organizations: Not on file     Relationship status: Not on file   • Intimate partner violence:     Fear of current or ex partner: Not on file     Emotionally abused: Not on file     Physically abused: Not on file     Forced sexual activity: Not on file   Other  "Topics Concern   • Not on file   Social History Narrative   • Not on file     Allergies   Allergen Reactions   • Bactrim [Sulfamethoxazole W-Trimethoprim] Shortness of Breath   • Bee Venom Anaphylaxis   • Econazole Anaphylaxis   • Floxin [Ofloxacin] Anaphylaxis, Shortness of Breath and Swelling     Cause throat swelling and difficulty breathing   • Gadolinium Derivatives Hives and Swelling     Throat swelling   • Iodine Shortness of Breath and Anaphylaxis     Throat and tongue swelling with IV contrast   • Keflex Shortness of Breath   • Levaquin Shortness of Breath     anaphlyaxis   • Levofloxacin Shortness of Breath   • Morphine Anaphylaxis   • Naloxone Hives     \"hives, SOB\"   • Nitrofurantoin Shortness of Breath     ...and hives     • Norco [Apap-Fd&C Yellow #10 Al Tai-Hydrocodone] Shortness of Breath     ...and hives     • Oxycodone Shortness of Breath     ...and hives     • Penicillins Shortness of Breath     ...and hives     • Tape Contact Dermatitis     Blisters, clear tegaderm ok.. No steristrips.   • Ancef [Cefazolin] Hives   • Bextra [Valdecoxib] Rash     \"Skin burn and peeling.\"   • Flagyl [Kdc:Metronidazole+Tartrazine] Hives   • Linezolid Rash     Rash all over body   • Other Drug Hives     \"Enconazole nitrate.\" shortness of breath and hives   • Other Misc Unspecified     Adhesive tape: blisters, skin peels off   • Buprenorphine Anaphylaxis   • Clindamycin      HIVES     • Tygacil [Tigecycline]      itching   • Zithromax [Azithromycin] Hives and Shortness of Breath     Pt had Hives also     Outpatient Encounter Medications as of 11/15/2019   Medication Sig Dispense Refill   • carvedilol (COREG) 12.5 MG Tab Take 0.5 Tabs by mouth 2 times a day, with meals. 60 Tab 1   • spironolactone (ALDACTONE) 25 MG Tab Take 1 Tab by mouth every day. 30 Tab 3   • acetaminophen (TYLENOL) 500 MG Tab Take 1,000 mg by mouth every 6 hours as needed for Moderate Pain.     • DULoxetine (CYMBALTA) 60 MG Cap DR Particles " delayed-release capsule Take 60 mg by mouth every evening.     • Frovatriptan Succinate (FROVA) 2.5 MG Tab Take 2.5 mg by mouth as needed (For migraines).     • hydrOXYzine HCl (ATARAX) 25 MG Tab Take 25 mg by mouth every bedtime. For sleep     • montelukast (SINGULAIR) 10 MG Tab Take 10 mg by mouth every evening.     • pantoprazole (PROTONIX) 40 MG Tablet Delayed Response Take 40 mg by mouth every evening.     • tizanidine (ZANAFLEX) 4 MG Tab Take 2-4 mg by mouth every 6 hours as needed. Indications: Muscle Spasticity     • FARXIGA 5 MG Tab Take 5 mg by mouth every evening.     • calcitonin, salmon, (MIACALCIN) 200 UNIT/ACT Solution Spray 1 Spray in nose every day.  12   • meloxicam (MOBIC) 15 MG tablet Take 15 mg by mouth every evening.     • furosemide (LASIX) 20 MG Tab TAKE 0.5 TABS BY MOUTH 2 TIMES A DAY. 90 Tab 1   • Cyanocobalamin (B-12) 1000 MCG/ML Kit 1,000 mcg by Injection route every 7 days. On Tue     • colesevelam (WELCHOL) 625 MG Tab Take 625 mg by mouth 3 times a day, with meals.     • EPINEPHrine (EPIPEN) 0.3 MG/0.3ML Solution Auto-injector solution for injection 0.3 mg by Intramuscular route Once. Indications: Life-Threatening Hypersensitivity Reaction     • cevimeline (EVOXAC) 30 MG capsule Take 1 Cap by mouth 3 times a day. 270 Cap 0   • cetirizine (KLS ALLER-HAWA) 10 MG Tab Take 20 mg by mouth 2 times a day.     • liothyronine (CYTOMEL) 25 MCG Tab Take 25 mcg by mouth every evening.     • AIMOVIG 70 MG/ML Solution Auto-injector 1 mL by Injection route Q30 DAYS.     • estradiol (ESTRACE) 0.5 MG tablet Take 0.5 mg by mouth every day.     • SYNTHROID 75 MCG Tab Take 75 mcg by mouth every evening.     • potassium chloride SA (KDUR) 20 MEQ Tab CR Take 1 Tab by mouth every day. 30 Tab 1   • gabapentin (NEURONTIN) 400 MG Cap Take 1 Cap by mouth 3 times a day. 180 Cap 3   • Coral Calcium 1000 (390 Ca) MG Tab Take 1,000 mg by mouth every evening.     • magnesium oxide (MAG-OX) 400 MG Tab Take 400 mg by  "mouth every evening.     • Biotin 5000 MCG Cap Take 1 Cap by mouth every day.     • Cholecalciferol (VITAMIN D) 2000 units Cap Take 2,000 Units by mouth every evening.     • Menaquinone-7 (VITAMIN K2) 100 MCG Cap Take 1 Tab by mouth every day.     • multivitamin (THERAGRAN) Tab Take 1 Tab by mouth every day.     • DULoxetine (CYMBALTA) 60 MG Cap DR Particles delayed-release capsule Take 1 Cap by mouth every day. 30 Cap 0   • predniSONE (DELTASONE) 20 MG Tab Take 20 mg by mouth every day.       No facility-administered encounter medications on file as of 11/15/2019.      Review of Systems   Constitutional: Positive for malaise/fatigue.   HENT: Positive for congestion.    Respiratory: Positive for shortness of breath.    Cardiovascular: Positive for chest pain. Negative for palpitations and leg swelling.   Gastrointestinal: Negative for nausea and vomiting.   Genitourinary: Negative for dysuria.   Musculoskeletal: Negative for myalgias.   Neurological: Negative for dizziness.   Endo/Heme/Allergies: Positive for environmental allergies.        Objective:   /118 (BP Location: Left arm, Patient Position: Sitting)   Pulse (!) 104   Ht 1.626 m (5' 4\")   Wt 80.3 kg (177 lb)   LMP 02/07/2016 (Within Months)   SpO2 95%   BMI 30.38 kg/m²     Physical Exam   Constitutional: She is oriented to person, place, and time. No distress.   HENT:   Head: Atraumatic.   Eyes: Left eye exhibits no discharge.   Neck: No JVD present. No thyromegaly present.   Cardiovascular: Regular rhythm. Exam reveals gallop. Exam reveals no friction rub.   No murmur heard.  Pulmonary/Chest: Effort normal.   Abdominal: Soft.   Musculoskeletal:         General: No edema.   Neurological: She is alert and oriented to person, place, and time.   Skin: Skin is warm.   Psychiatric: She has a normal mood and affect. Her behavior is normal.       Assessment:     1. Dilated cardiomyopathy (HCC)     2. Essential hypertension     3.      Syncope no " recurrence    Medical Decision Making:  Today's Assessment / Status / Plan:     Her history somewhat suggestive of post viral cardiomyopathy.  She has no signs of fluid retention. Recheck ECHO in 6 weeks.  Blood pressure is somewhat elevated today.  Resume losartan 12.5 mg.  She is to monitor her blood pressure at home and try to uptitrate losartan to 25 mg/day if blood pressure allow.  He is to contact us if develop has recurrent syncope or significant dizziness.  RTC  2 months or sooner as needed.

## 2019-11-22 ENCOUNTER — TELEPHONE (OUTPATIENT)
Dept: CARDIOLOGY | Facility: MEDICAL CENTER | Age: 54
End: 2019-11-22

## 2019-11-22 DIAGNOSIS — R93.1 DECREASED CARDIAC EJECTION FRACTION: ICD-10-CM

## 2019-11-22 DIAGNOSIS — I50.20 HEART FAILURE, ACC/AHA STAGE C WITH DECREASED EJECTION FRACTION (HCC): ICD-10-CM

## 2019-11-22 NOTE — TELEPHONE ENCOUNTER
Adriana Carlson M.D.  You 1 hour ago (1:50 PM)      We would not be able to say that   With her EF of 20%, her syncope could be from ventricular arrhythmias and can occur at any time until her heart function recovers   It will be safer if someone else drives   Would you check with an EP docs to see if she would wear one of those Life vest?      Called pt, discussed CR recommendations, pt appreciative of call and verbalizes understanding

## 2019-11-22 NOTE — TELEPHONE ENCOUNTER
Called pt, pt reports she will be driving to [place] next Friday 11/30 for 6hrs and she wants to make sure she will be ok to do this, she currently denies any cardiac symptoms.     To CR, please advice. Thank You!

## 2019-11-22 NOTE — TELEPHONE ENCOUNTER
DARLEEN/dada    Pt calling to ask if CR will approve pt to be driving alone for about 6 hours into [facility] next week.  Please call

## 2019-11-25 NOTE — TELEPHONE ENCOUNTER
Message   Received: Today   Message Contents   RIVERA De La Garza M.D.; Tata Ovalle L.P.N.   Caller: Unspecified (3 days ago, 10:29 AM)             Hi DARLEEN Huizar is requesting your advise if pt should wear Life Vest. (He is currently on vacation this week)     Per EF is 20% and pt syncope could be from ventricular arrhythmias and can occurs at anytime until her heart function recovers.     Please advise, thank you!

## 2019-11-26 ENCOUNTER — OFFICE VISIT (OUTPATIENT)
Dept: NEUROLOGY | Facility: MEDICAL CENTER | Age: 54
End: 2019-11-26
Payer: MEDICARE

## 2019-11-26 VITALS
OXYGEN SATURATION: 99 % | WEIGHT: 182 LBS | DIASTOLIC BLOOD PRESSURE: 82 MMHG | BODY MASS INDEX: 31.07 KG/M2 | SYSTOLIC BLOOD PRESSURE: 124 MMHG | TEMPERATURE: 96.9 F | HEART RATE: 82 BPM | HEIGHT: 64 IN

## 2019-11-26 DIAGNOSIS — G43.719 INTRACTABLE CHRONIC MIGRAINE WITHOUT AURA AND WITHOUT STATUS MIGRAINOSUS: ICD-10-CM

## 2019-11-26 PROCEDURE — 64615 CHEMODENERV MUSC MIGRAINE: CPT | Performed by: PSYCHIATRY & NEUROLOGY

## 2019-11-26 NOTE — PROGRESS NOTES
Botulinum Toxin Injection for Chronic Migraine    Last injection date: 2018  Response: Improved severity/duration/frequency  Complication: None  Subjective: Currently on Aimovig subcutaneous with 3-4 migraines per month with duration of 2 to 24 hours.  Patient has relative contraindication to Aimovig and needs to switch back to Botox for chronic migraine.     Procedure: Botulinum toxin injection per PREEMPT protocol  Diagnosis: Chronic Migraine  Performed: [facility]  Performed by: Martha Elmore MD  Consent Obtained: Yes (including risks, benefits, and alternatives)  The risk include but not limited to neck pain, headache, migraine, slight or partial facial paralysis, eyelid drooping, distant side effects such as breathing problems.  Follow up/Discharge Instructions: Given     Description: The skin was exposed and prepped with alcohol. Injections were done using sterile techniques.  Botox (lot # [id number], expiration date 2/2022) was mixed in presence of the patient with preservative free normal saline in standard dilution 10 units in 0.1 ml and injected as follows:  - Procerus 5 units  -  10 units (5 unit per site, 1 site each side)  - Frontalis 20 units (5 units per site, 2 sites each side)  - Temporalis 40 units (5 units per site, 4 sites each side)  - Occipitalis 30 units (5 units per site, 3 sites each side)  - Cervical paraspinals 20 units (5 units per site, 2 sites each side)  - Trapezius 30 units (5 units per site, 3 sites each side)     Total 155 units used and 45 units wasted.   Patient tolerated procedure well with minimal blood loss.  F/u 12 weeks for repeat injections.    Buy and bill. Clinic supplied.    Ok to continue for one more dose of Aimovig then stop when botox effective.     Martha Elmore MD  Neurology - Neurophysiology  [facility]

## 2019-11-27 NOTE — TELEPHONE ENCOUNTER
Other (driving advice)    Adriana Carlson M.D.  You 22 hours ago (11:50 AM)      EP consult please      EP referral placed.

## 2019-12-02 ENCOUNTER — APPOINTMENT (RX ONLY)
Dept: URBAN - METROPOLITAN AREA CLINIC 4 | Facility: CLINIC | Age: 54
Setting detail: DERMATOLOGY
End: 2019-12-02

## 2019-12-02 DIAGNOSIS — D22 MELANOCYTIC NEVI: ICD-10-CM

## 2019-12-02 DIAGNOSIS — Z71.89 OTHER SPECIFIED COUNSELING: ICD-10-CM

## 2019-12-02 DIAGNOSIS — L81.4 OTHER MELANIN HYPERPIGMENTATION: ICD-10-CM

## 2019-12-02 DIAGNOSIS — L56.5 DISSEMINATED SUPERFICIAL ACTINIC POROKERATOSIS (DSAP): ICD-10-CM

## 2019-12-02 DIAGNOSIS — D18.0 HEMANGIOMA: ICD-10-CM

## 2019-12-02 DIAGNOSIS — L82.1 OTHER SEBORRHEIC KERATOSIS: ICD-10-CM

## 2019-12-02 DIAGNOSIS — L57.0 ACTINIC KERATOSIS: ICD-10-CM

## 2019-12-02 PROBLEM — D22.71 MELANOCYTIC NEVI OF RIGHT LOWER LIMB, INCLUDING HIP: Status: ACTIVE | Noted: 2019-12-02

## 2019-12-02 PROBLEM — D22.5 MELANOCYTIC NEVI OF TRUNK: Status: ACTIVE | Noted: 2019-12-02

## 2019-12-02 PROBLEM — D22.62 MELANOCYTIC NEVI OF LEFT UPPER LIMB, INCLUDING SHOULDER: Status: ACTIVE | Noted: 2019-12-02

## 2019-12-02 PROBLEM — D22.39 MELANOCYTIC NEVI OF OTHER PARTS OF FACE: Status: ACTIVE | Noted: 2019-12-02

## 2019-12-02 PROBLEM — D18.01 HEMANGIOMA OF SKIN AND SUBCUTANEOUS TISSUE: Status: ACTIVE | Noted: 2019-12-02

## 2019-12-02 PROBLEM — D22.61 MELANOCYTIC NEVI OF RIGHT UPPER LIMB, INCLUDING SHOULDER: Status: ACTIVE | Noted: 2019-12-02

## 2019-12-02 PROBLEM — D22.72 MELANOCYTIC NEVI OF LEFT LOWER LIMB, INCLUDING HIP: Status: ACTIVE | Noted: 2019-12-02

## 2019-12-02 PROCEDURE — ? ADDITIONAL NOTES

## 2019-12-02 PROCEDURE — 17000 DESTRUCT PREMALG LESION: CPT

## 2019-12-02 PROCEDURE — ? LIQUID NITROGEN

## 2019-12-02 PROCEDURE — 17003 DESTRUCT PREMALG LES 2-14: CPT

## 2019-12-02 PROCEDURE — 99214 OFFICE O/P EST MOD 30 MIN: CPT | Mod: 25

## 2019-12-02 PROCEDURE — ? COUNSELING

## 2019-12-02 ASSESSMENT — LOCATION DETAILED DESCRIPTION DERM
LOCATION DETAILED: RIGHT INFERIOR ANTERIOR NECK
LOCATION DETAILED: LEFT MEDIAL UPPER BACK
LOCATION DETAILED: LOWER STERNUM
LOCATION DETAILED: SUPERIOR THORACIC SPINE
LOCATION DETAILED: LEFT DISTAL PRETIBIAL REGION
LOCATION DETAILED: RIGHT ANTERIOR DISTAL THIGH
LOCATION DETAILED: RIGHT ANTERIOR PROXIMAL THIGH
LOCATION DETAILED: LEFT ANTERIOR DISTAL THIGH
LOCATION DETAILED: LEFT INFERIOR MEDIAL FOREHEAD
LOCATION DETAILED: NASAL DORSUM
LOCATION DETAILED: LEFT MEDIAL FOREHEAD
LOCATION DETAILED: LEFT ANTERIOR PROXIMAL UPPER ARM
LOCATION DETAILED: LEFT ANTERIOR PROXIMAL THIGH
LOCATION DETAILED: LEFT RADIAL DORSAL HAND
LOCATION DETAILED: LEFT MEDIAL DORSAL WRIST
LOCATION DETAILED: LEFT ANTERIOR DISTAL UPPER ARM
LOCATION DETAILED: EPIGASTRIC SKIN
LOCATION DETAILED: RIGHT ANTERIOR PROXIMAL UPPER ARM
LOCATION DETAILED: LEFT SUPERIOR MEDIAL UPPER BACK
LOCATION DETAILED: RIGHT DISTAL DORSAL FOREARM
LOCATION DETAILED: RIGHT ANTERIOR DISTAL UPPER ARM
LOCATION DETAILED: INFERIOR THORACIC SPINE
LOCATION DETAILED: MIDDLE STERNUM

## 2019-12-02 ASSESSMENT — LOCATION ZONE DERM
LOCATION ZONE: FACE
LOCATION ZONE: HAND
LOCATION ZONE: LEG
LOCATION ZONE: ARM
LOCATION ZONE: NOSE
LOCATION ZONE: TRUNK
LOCATION ZONE: NECK

## 2019-12-02 ASSESSMENT — LOCATION SIMPLE DESCRIPTION DERM
LOCATION SIMPLE: UPPER BACK
LOCATION SIMPLE: LEFT HAND
LOCATION SIMPLE: ABDOMEN
LOCATION SIMPLE: CHEST
LOCATION SIMPLE: NOSE
LOCATION SIMPLE: LEFT PRETIBIAL REGION
LOCATION SIMPLE: LEFT UPPER BACK
LOCATION SIMPLE: RIGHT FOREARM
LOCATION SIMPLE: RIGHT UPPER ARM
LOCATION SIMPLE: LEFT WRIST
LOCATION SIMPLE: LEFT UPPER ARM
LOCATION SIMPLE: RIGHT THIGH
LOCATION SIMPLE: LEFT FOREHEAD
LOCATION SIMPLE: RIGHT ANTERIOR NECK
LOCATION SIMPLE: LEFT THIGH

## 2019-12-02 NOTE — PROCEDURE: LIQUID NITROGEN
Consent: The patient's consent was obtained including but not limited to risks of crusting, scabbing, blistering, scarring, darker or lighter pigmentary change, recurrence, incomplete removal and infection.
Detail Level: Detailed
Render Note In Bullet Format When Appropriate: No
Post-Care Instructions: I reviewed with the patient in detail post-care instructions. Patient is to wear sunprotection, and avoid picking at any of the treated lesions. Pt may apply Vaseline to crusted or scabbing areas.
Duration Of Freeze Thaw-Cycle (Seconds): 3

## 2019-12-05 NOTE — TELEPHONE ENCOUNTER
To schedulers to schedule urgent EP appointment.       Message   Received: 2 days ago   Message Contents   ERNESTO Sauer L.P.N.   Caller: Unspecified (1 week ago)             If she is having syncope preceded by palpitations that does not sound benign in setting of EF of 20%, she needs a secondary prevention ICD.    Previous Messages

## 2019-12-09 ENCOUNTER — PATIENT MESSAGE (OUTPATIENT)
Dept: CARDIOLOGY | Facility: MEDICAL CENTER | Age: 54
End: 2019-12-09

## 2019-12-09 ENCOUNTER — HOSPITAL ENCOUNTER (OUTPATIENT)
Dept: LAB | Facility: MEDICAL CENTER | Age: 54
End: 2019-12-09
Attending: INTERNAL MEDICINE
Payer: MEDICARE

## 2019-12-09 LAB
ALBUMIN SERPL BCP-MCNC: 4.6 G/DL (ref 3.2–4.9)
ALBUMIN/GLOB SERPL: 1.8 G/DL
ALP SERPL-CCNC: 88 U/L (ref 30–99)
ALT SERPL-CCNC: 26 U/L (ref 2–50)
ANION GAP SERPL CALC-SCNC: 8 MMOL/L (ref 0–11.9)
AST SERPL-CCNC: 25 U/L (ref 12–45)
BILIRUB SERPL-MCNC: 0.4 MG/DL (ref 0.1–1.5)
BUN SERPL-MCNC: 16 MG/DL (ref 8–22)
CALCIUM SERPL-MCNC: 9.3 MG/DL (ref 8.5–10.5)
CHLORIDE SERPL-SCNC: 103 MMOL/L (ref 96–112)
CO2 SERPL-SCNC: 26 MMOL/L (ref 20–33)
CREAT SERPL-MCNC: 0.99 MG/DL (ref 0.5–1.4)
CREAT UR-MCNC: 75.8 MG/DL
GLOBULIN SER CALC-MCNC: 2.5 G/DL (ref 1.9–3.5)
GLUCOSE SERPL-MCNC: 89 MG/DL (ref 65–99)
PHOSPHATE SERPL-MCNC: 3.7 MG/DL (ref 2.5–4.5)
POTASSIUM SERPL-SCNC: 3.9 MMOL/L (ref 3.6–5.5)
POTASSIUM UR-SCNC: 42.8 MMOL/L
PROT SERPL-MCNC: 7.1 G/DL (ref 6–8.2)
PTH-INTACT SERPL-MCNC: 94.8 PG/ML (ref 14–72)
SODIUM SERPL-SCNC: 137 MMOL/L (ref 135–145)
SODIUM UR-SCNC: 110 MMOL/L
T3FREE SERPL-MCNC: 3.1 PG/ML (ref 2.4–4.2)
T4 FREE SERPL-MCNC: 1.05 NG/DL (ref 0.53–1.43)
TSH SERPL DL<=0.005 MIU/L-ACNC: 2.51 UIU/ML (ref 0.38–5.33)

## 2019-12-09 PROCEDURE — 83970 ASSAY OF PARATHORMONE: CPT

## 2019-12-09 PROCEDURE — 84100 ASSAY OF PHOSPHORUS: CPT

## 2019-12-09 PROCEDURE — 82163 ASSAY OF ANGIOTENSIN II: CPT

## 2019-12-09 PROCEDURE — 84481 FREE ASSAY (FT-3): CPT

## 2019-12-09 PROCEDURE — 84439 ASSAY OF FREE THYROXINE: CPT

## 2019-12-09 PROCEDURE — 84300 ASSAY OF URINE SODIUM: CPT

## 2019-12-09 PROCEDURE — 84105 ASSAY OF URINE PHOSPHORUS: CPT

## 2019-12-09 PROCEDURE — 84443 ASSAY THYROID STIM HORMONE: CPT

## 2019-12-09 PROCEDURE — 82340 ASSAY OF CALCIUM IN URINE: CPT

## 2019-12-09 PROCEDURE — 82570 ASSAY OF URINE CREATININE: CPT

## 2019-12-09 PROCEDURE — 82652 VIT D 1 25-DIHYDROXY: CPT | Mod: GA

## 2019-12-09 PROCEDURE — 83520 IMMUNOASSAY QUANT NOS NONAB: CPT

## 2019-12-09 PROCEDURE — 80053 COMPREHEN METABOLIC PANEL: CPT

## 2019-12-09 PROCEDURE — 84133 ASSAY OF URINE POTASSIUM: CPT

## 2019-12-09 PROCEDURE — 36415 COLL VENOUS BLD VENIPUNCTURE: CPT

## 2019-12-09 NOTE — PATIENT COMMUNICATION
"Called pt, unable to reach.  Left voicemail to activate EMS and go to nearest ED.    Attempted to call patient again and spoke to pt regarding MyChart message.  Pt states last night it was 220/135.  She states she just retook her BP and it was 185/113 and she is and has been asymptomatic.  ED/EMS precautions noted and emphasized.  She verbalizes understanding and states, \"I will check it again and if it is still high I will go to the ER.\"  Reassurance given that findings will be relayed to MD.  She states no other concerns or questions at this time and is appreciative of information given.    Pt update relayed to MD for advise.    "

## 2019-12-10 ENCOUNTER — HOSPITAL ENCOUNTER (OUTPATIENT)
Facility: MEDICAL CENTER | Age: 54
End: 2019-12-11
Attending: EMERGENCY MEDICINE | Admitting: HOSPITALIST
Payer: MEDICARE

## 2019-12-10 ENCOUNTER — APPOINTMENT (OUTPATIENT)
Dept: RADIOLOGY | Facility: MEDICAL CENTER | Age: 54
End: 2019-12-10
Attending: EMERGENCY MEDICINE
Payer: MEDICARE

## 2019-12-10 DIAGNOSIS — I16.0 HYPERTENSIVE URGENCY: ICD-10-CM

## 2019-12-10 DIAGNOSIS — R07.9 CHEST PAIN, UNSPECIFIED TYPE: ICD-10-CM

## 2019-12-10 PROBLEM — I16.9 HYPERTENSIVE CRISIS: Status: ACTIVE | Noted: 2018-09-26

## 2019-12-10 LAB
1,25(OH)2D3 SERPL-MCNC: 91.3 PG/ML (ref 19.9–79.3)
ALBUMIN SERPL BCP-MCNC: 4.2 G/DL (ref 3.2–4.9)
ALBUMIN/GLOB SERPL: 1.5 G/DL
ALP SERPL-CCNC: 92 U/L (ref 30–99)
ALT SERPL-CCNC: 28 U/L (ref 2–50)
ANION GAP SERPL CALC-SCNC: 15 MMOL/L (ref 0–11.9)
APPEARANCE UR: CLEAR
APTT PPP: 28.6 SEC (ref 24.7–36)
AST SERPL-CCNC: 29 U/L (ref 12–45)
BASOPHILS # BLD AUTO: 0.4 % (ref 0–1.8)
BASOPHILS # BLD: 0.02 K/UL (ref 0–0.12)
BILIRUB SERPL-MCNC: 0.2 MG/DL (ref 0.1–1.5)
BILIRUB UR QL STRIP.AUTO: NEGATIVE
BUN SERPL-MCNC: 16 MG/DL (ref 8–22)
CALCIUM 24H UR-MCNC: 5.1 MG/DL
CALCIUM 24H UR-MRATE: NORMAL MG/D
CALCIUM SERPL-MCNC: 9.2 MG/DL (ref 8.4–10.2)
CALCIUM/CREAT 24H UR: 68 MG/G (ref 20–300)
CHLORIDE SERPL-SCNC: 101 MMOL/L (ref 96–112)
CO2 SERPL-SCNC: 22 MMOL/L (ref 20–33)
COLLECT DURATION TIME SPEC: NORMAL HRS
COLLECT DURATION TIME SPEC: NORMAL HRS
COLOR UR: YELLOW
CREAT 24H UR-MCNC: 75 MG/DL
CREAT 24H UR-MRATE: NORMAL MG/D (ref 500–1400)
CREAT SERPL-MCNC: 1.14 MG/DL (ref 0.5–1.4)
EKG IMPRESSION: NORMAL
EOSINOPHIL # BLD AUTO: 0.07 K/UL (ref 0–0.51)
EOSINOPHIL NFR BLD: 1.3 % (ref 0–6.9)
ERYTHROCYTE [DISTWIDTH] IN BLOOD BY AUTOMATED COUNT: 47 FL (ref 35.9–50)
GLOBULIN SER CALC-MCNC: 2.8 G/DL (ref 1.9–3.5)
GLUCOSE SERPL-MCNC: 91 MG/DL (ref 65–99)
GLUCOSE UR STRIP.AUTO-MCNC: 500 MG/DL
HCT VFR BLD AUTO: 47.6 % (ref 37–47)
HGB BLD-MCNC: 15.1 G/DL (ref 12–16)
IMM GRANULOCYTES # BLD AUTO: 0.01 K/UL (ref 0–0.11)
IMM GRANULOCYTES NFR BLD AUTO: 0.2 % (ref 0–0.9)
INR PPP: 0.91 (ref 0.87–1.13)
KETONES UR STRIP.AUTO-MCNC: NEGATIVE MG/DL
LEUKOCYTE ESTERASE UR QL STRIP.AUTO: NEGATIVE
LYMPHOCYTES # BLD AUTO: 1.7 K/UL (ref 1–4.8)
LYMPHOCYTES NFR BLD: 31.2 % (ref 22–41)
MCH RBC QN AUTO: 27.6 PG (ref 27–33)
MCHC RBC AUTO-ENTMCNC: 31.7 G/DL (ref 33.6–35)
MCV RBC AUTO: 87 FL (ref 81.4–97.8)
MICRO URNS: ABNORMAL
MONOCYTES # BLD AUTO: 0.62 K/UL (ref 0–0.85)
MONOCYTES NFR BLD AUTO: 11.4 % (ref 0–13.4)
NEUTROPHILS # BLD AUTO: 3.03 K/UL (ref 2–7.15)
NEUTROPHILS NFR BLD: 55.5 % (ref 44–72)
NITRITE UR QL STRIP.AUTO: NEGATIVE
NRBC # BLD AUTO: 0 K/UL
NRBC BLD-RTO: 0 /100 WBC
NT-PROBNP SERPL IA-MCNC: 153 PG/ML (ref 0–125)
PH UR STRIP.AUTO: 5.5 [PH] (ref 5–8)
PHOSPHATE 24H UR-MCNC: 22 MG/DL
PHOSPHATE 24H UR-MRATE: NORMAL MG/D (ref 400–1300)
PHOSPHATE/CREAT 24H UR: 293 MG/G
PLATELET # BLD AUTO: 286 K/UL (ref 164–446)
PMV BLD AUTO: 9.8 FL (ref 9–12.9)
POTASSIUM SERPL-SCNC: 4 MMOL/L (ref 3.6–5.5)
PROT SERPL-MCNC: 7 G/DL (ref 6–8.2)
PROT UR QL STRIP: NEGATIVE MG/DL
PROTHROMBIN TIME: 12.3 SEC (ref 12–14.6)
RBC # BLD AUTO: 5.47 M/UL (ref 4.2–5.4)
RBC UR QL AUTO: NEGATIVE
SODIUM SERPL-SCNC: 138 MMOL/L (ref 135–145)
SP GR UR STRIP.AUTO: 1.01
SPECIMEN VOL ?TM UR: NORMAL ML
TOTAL VOLUME 1105: NORMAL ML
TROPONIN T SERPL-MCNC: 6 NG/L (ref 6–19)
TSH SERPL DL<=0.005 MIU/L-ACNC: 1.1 UIU/ML (ref 0.38–5.33)
WBC # BLD AUTO: 5.5 K/UL (ref 4.8–10.8)

## 2019-12-10 PROCEDURE — 83880 ASSAY OF NATRIURETIC PEPTIDE: CPT

## 2019-12-10 PROCEDURE — 96376 TX/PRO/DX INJ SAME DRUG ADON: CPT

## 2019-12-10 PROCEDURE — 85025 COMPLETE CBC W/AUTO DIFF WBC: CPT

## 2019-12-10 PROCEDURE — G0378 HOSPITAL OBSERVATION PER HR: HCPCS

## 2019-12-10 PROCEDURE — 36415 COLL VENOUS BLD VENIPUNCTURE: CPT

## 2019-12-10 PROCEDURE — 71045 X-RAY EXAM CHEST 1 VIEW: CPT

## 2019-12-10 PROCEDURE — 700101 HCHG RX REV CODE 250: Performed by: EMERGENCY MEDICINE

## 2019-12-10 PROCEDURE — A9270 NON-COVERED ITEM OR SERVICE: HCPCS

## 2019-12-10 PROCEDURE — A9270 NON-COVERED ITEM OR SERVICE: HCPCS | Performed by: HOSPITALIST

## 2019-12-10 PROCEDURE — 700102 HCHG RX REV CODE 250 W/ 637 OVERRIDE(OP)

## 2019-12-10 PROCEDURE — 96375 TX/PRO/DX INJ NEW DRUG ADDON: CPT

## 2019-12-10 PROCEDURE — 84443 ASSAY THYROID STIM HORMONE: CPT

## 2019-12-10 PROCEDURE — 85610 PROTHROMBIN TIME: CPT

## 2019-12-10 PROCEDURE — 85730 THROMBOPLASTIN TIME PARTIAL: CPT

## 2019-12-10 PROCEDURE — 96374 THER/PROPH/DIAG INJ IV PUSH: CPT

## 2019-12-10 PROCEDURE — 80053 COMPREHEN METABOLIC PANEL: CPT

## 2019-12-10 PROCEDURE — 99220 PR INITIAL OBSERVATION CARE,LEVL III: CPT | Performed by: HOSPITALIST

## 2019-12-10 PROCEDURE — 700102 HCHG RX REV CODE 250 W/ 637 OVERRIDE(OP): Performed by: HOSPITALIST

## 2019-12-10 PROCEDURE — 700111 HCHG RX REV CODE 636 W/ 250 OVERRIDE (IP): Performed by: EMERGENCY MEDICINE

## 2019-12-10 PROCEDURE — 84484 ASSAY OF TROPONIN QUANT: CPT

## 2019-12-10 PROCEDURE — 99285 EMERGENCY DEPT VISIT HI MDM: CPT

## 2019-12-10 PROCEDURE — 81003 URINALYSIS AUTO W/O SCOPE: CPT

## 2019-12-10 PROCEDURE — 93005 ELECTROCARDIOGRAM TRACING: CPT | Performed by: EMERGENCY MEDICINE

## 2019-12-10 PROCEDURE — 70450 CT HEAD/BRAIN W/O DYE: CPT

## 2019-12-10 RX ORDER — PANTOPRAZOLE SODIUM 40 MG/1
40 TABLET, DELAYED RELEASE ORAL EVERY EVENING
Status: DISCONTINUED | OUTPATIENT
Start: 2019-12-10 | End: 2019-12-10

## 2019-12-10 RX ORDER — GABAPENTIN 400 MG/1
400 CAPSULE ORAL 3 TIMES DAILY
Status: DISCONTINUED | OUTPATIENT
Start: 2019-12-10 | End: 2019-12-11 | Stop reason: HOSPADM

## 2019-12-10 RX ORDER — DULOXETIN HYDROCHLORIDE 30 MG/1
60 CAPSULE, DELAYED RELEASE ORAL EVERY EVENING
Status: DISCONTINUED | OUTPATIENT
Start: 2019-12-10 | End: 2019-12-11 | Stop reason: HOSPADM

## 2019-12-10 RX ORDER — LOSARTAN POTASSIUM 25 MG/1
100 TABLET ORAL
Status: DISCONTINUED | OUTPATIENT
Start: 2019-12-11 | End: 2019-12-11 | Stop reason: HOSPADM

## 2019-12-10 RX ORDER — FROVATRIPTAN SUCCINATE 2.5 MG/1
2.5 TABLET, FILM COATED ORAL PRN
Status: DISCONTINUED | OUTPATIENT
Start: 2019-12-10 | End: 2019-12-10

## 2019-12-10 RX ORDER — LABETALOL HYDROCHLORIDE 5 MG/ML
10 INJECTION, SOLUTION INTRAVENOUS ONCE
Status: COMPLETED | OUTPATIENT
Start: 2019-12-10 | End: 2019-12-10

## 2019-12-10 RX ORDER — TIZANIDINE 4 MG/1
2-4 TABLET ORAL EVERY 6 HOURS PRN
Status: DISCONTINUED | OUTPATIENT
Start: 2019-12-10 | End: 2019-12-11 | Stop reason: HOSPADM

## 2019-12-10 RX ORDER — FUROSEMIDE 20 MG/1
10 TABLET ORAL 2 TIMES DAILY
Status: DISCONTINUED | OUTPATIENT
Start: 2019-12-10 | End: 2019-12-11 | Stop reason: HOSPADM

## 2019-12-10 RX ORDER — SPIRONOLACTONE 50 MG/1
100 TABLET, FILM COATED ORAL EVERY EVENING
Status: DISCONTINUED | OUTPATIENT
Start: 2019-12-11 | End: 2019-12-11 | Stop reason: HOSPADM

## 2019-12-10 RX ORDER — CALCITONIN SALMON 200 [IU]/.09ML
SPRAY, METERED NASAL
Status: COMPLETED
Start: 2019-12-10 | End: 2019-12-10

## 2019-12-10 RX ORDER — CARVEDILOL 6.25 MG/1
6.25 TABLET ORAL 2 TIMES DAILY WITH MEALS
Status: DISCONTINUED | OUTPATIENT
Start: 2019-12-10 | End: 2019-12-11 | Stop reason: HOSPADM

## 2019-12-10 RX ORDER — ESTRADIOL 1 MG/1
0.5 TABLET ORAL DAILY
Status: DISCONTINUED | OUTPATIENT
Start: 2019-12-11 | End: 2019-12-11 | Stop reason: HOSPADM

## 2019-12-10 RX ORDER — SPIRONOLACTONE 25 MG/1
50 TABLET ORAL EVERY EVENING
Status: DISCONTINUED | OUTPATIENT
Start: 2019-12-10 | End: 2019-12-10

## 2019-12-10 RX ORDER — MONTELUKAST SODIUM 10 MG/1
10 TABLET ORAL EVERY EVENING
Status: DISCONTINUED | OUTPATIENT
Start: 2019-12-10 | End: 2019-12-11 | Stop reason: HOSPADM

## 2019-12-10 RX ORDER — LEVOTHYROXINE SODIUM 0.05 MG/1
75 TABLET ORAL EVERY EVENING
Status: DISCONTINUED | OUTPATIENT
Start: 2019-12-10 | End: 2019-12-11 | Stop reason: HOSPADM

## 2019-12-10 RX ORDER — COLESEVELAM 180 1/1
625 TABLET ORAL
Status: DISCONTINUED | OUTPATIENT
Start: 2019-12-10 | End: 2019-12-11 | Stop reason: HOSPADM

## 2019-12-10 RX ORDER — LIOTHYRONINE SODIUM 5 UG/1
25 TABLET ORAL DAILY
Status: DISCONTINUED | OUTPATIENT
Start: 2019-12-11 | End: 2019-12-11 | Stop reason: HOSPADM

## 2019-12-10 RX ORDER — CALCITONIN SALMON 200 [IU]/.09ML
1 SPRAY, METERED NASAL
Status: DISCONTINUED | OUTPATIENT
Start: 2019-12-10 | End: 2019-12-11 | Stop reason: HOSPADM

## 2019-12-10 RX ORDER — SUMATRIPTAN 25 MG/1
100 TABLET, FILM COATED ORAL
Status: DISCONTINUED | OUTPATIENT
Start: 2019-12-10 | End: 2019-12-11 | Stop reason: HOSPADM

## 2019-12-10 RX ORDER — SPIRONOLACTONE 25 MG/1
25 TABLET ORAL EVERY EVENING
COMMUNITY
End: 2020-04-21

## 2019-12-10 RX ORDER — HYDRALAZINE HYDROCHLORIDE 20 MG/ML
20 INJECTION INTRAMUSCULAR; INTRAVENOUS EVERY 6 HOURS PRN
Status: DISCONTINUED | OUTPATIENT
Start: 2019-12-10 | End: 2019-12-11 | Stop reason: HOSPADM

## 2019-12-10 RX ORDER — HYDRALAZINE HYDROCHLORIDE 20 MG/ML
10 INJECTION INTRAMUSCULAR; INTRAVENOUS ONCE
Status: COMPLETED | OUTPATIENT
Start: 2019-12-10 | End: 2019-12-10

## 2019-12-10 RX ORDER — OMEPRAZOLE 20 MG/1
20 CAPSULE, DELAYED RELEASE ORAL DAILY
Status: DISCONTINUED | OUTPATIENT
Start: 2019-12-10 | End: 2019-12-11 | Stop reason: HOSPADM

## 2019-12-10 RX ORDER — HYDROXYZINE HYDROCHLORIDE 25 MG/1
25 TABLET, FILM COATED ORAL
Status: DISCONTINUED | OUTPATIENT
Start: 2019-12-10 | End: 2019-12-11 | Stop reason: HOSPADM

## 2019-12-10 RX ADMIN — CALCITONIN SALMON 200 UNITS: 200 SPRAY, METERED NASAL at 23:48

## 2019-12-10 RX ADMIN — LABETALOL HYDROCHLORIDE 10 MG: 5 INJECTION INTRAVENOUS at 15:57

## 2019-12-10 RX ADMIN — COLESEVELAM HYDROCHLORIDE 625 MG: 625 TABLET, FILM COATED ORAL at 23:47

## 2019-12-10 RX ADMIN — HYDRALAZINE HYDROCHLORIDE 10 MG: 20 INJECTION INTRAMUSCULAR; INTRAVENOUS at 18:06

## 2019-12-10 RX ADMIN — MONTELUKAST 10 MG: 10 TABLET, FILM COATED ORAL at 23:47

## 2019-12-10 RX ADMIN — GABAPENTIN 400 MG: 400 CAPSULE ORAL at 23:47

## 2019-12-10 RX ADMIN — FUROSEMIDE 10 MG: 20 TABLET ORAL at 21:15

## 2019-12-10 RX ADMIN — SUMATRIPTAN SUCCINATE 100 MG: 25 TABLET ORAL at 21:15

## 2019-12-10 RX ADMIN — SPIRONOLACTONE 50 MG: 25 TABLET ORAL at 21:15

## 2019-12-10 RX ADMIN — LABETALOL HYDROCHLORIDE 10 MG: 5 INJECTION INTRAVENOUS at 17:23

## 2019-12-10 RX ADMIN — LEVOTHYROXINE SODIUM 75 MCG: 50 TABLET ORAL at 23:47

## 2019-12-10 RX ADMIN — OMEPRAZOLE 20 MG: 20 CAPSULE, DELAYED RELEASE ORAL at 23:47

## 2019-12-10 RX ADMIN — CARVEDILOL 6.25 MG: 6.25 TABLET, FILM COATED ORAL at 21:15

## 2019-12-10 RX ADMIN — DULOXETINE HYDROCHLORIDE 60 MG: 30 CAPSULE, DELAYED RELEASE ORAL at 23:46

## 2019-12-10 SDOH — HEALTH STABILITY: MENTAL HEALTH: HOW OFTEN DO YOU HAVE 6 OR MORE DRINKS ON ONE OCCASION?: NOT ASKED

## 2019-12-10 SDOH — HEALTH STABILITY: MENTAL HEALTH: HOW OFTEN DO YOU HAVE A DRINK CONTAINING ALCOHOL?: NOT ASKED

## 2019-12-10 SDOH — HEALTH STABILITY: MENTAL HEALTH: HOW MANY STANDARD DRINKS CONTAINING ALCOHOL DO YOU HAVE ON A TYPICAL DAY?: NOT ASKED

## 2019-12-10 ASSESSMENT — LIFESTYLE VARIABLES
TOTAL SCORE: 0
CONSUMPTION TOTAL: NEGATIVE
ON A TYPICAL DAY WHEN YOU DRINK ALCOHOL HOW MANY DRINKS DO YOU HAVE: 0
HAVE PEOPLE ANNOYED YOU BY CRITICIZING YOUR DRINKING: NO
ALCOHOL_USE: NO
HAVE YOU EVER FELT YOU SHOULD CUT DOWN ON YOUR DRINKING: NO
EVER HAD A DRINK FIRST THING IN THE MORNING TO STEADY YOUR NERVES TO GET RID OF A HANGOVER: NO
DO YOU DRINK ALCOHOL: NO
HOW MANY TIMES IN THE PAST YEAR HAVE YOU HAD 5 OR MORE DRINKS IN A DAY: 0
EVER FELT BAD OR GUILTY ABOUT YOUR DRINKING: NO
EVER_SMOKED: NEVER
AVERAGE NUMBER OF DAYS PER WEEK YOU HAVE A DRINK CONTAINING ALCOHOL: 0
TOTAL SCORE: 0
TOTAL SCORE: 0

## 2019-12-10 ASSESSMENT — COGNITIVE AND FUNCTIONAL STATUS - GENERAL
SUGGESTED CMS G CODE MODIFIER MOBILITY: CH
MOBILITY SCORE: 24
SUGGESTED CMS G CODE MODIFIER MOBILITY: CH
DAILY ACTIVITIY SCORE: 24
MOBILITY SCORE: 24
SUGGESTED CMS G CODE MODIFIER DAILY ACTIVITY: CH

## 2019-12-10 ASSESSMENT — ENCOUNTER SYMPTOMS
LOSS OF CONSCIOUSNESS: 0
NAUSEA: 0
NERVOUS/ANXIOUS: 0
DEPRESSION: 0
WEIGHT LOSS: 0
PSYCHIATRIC NEGATIVE: 1
SHORTNESS OF BREATH: 0
BRUISES/BLEEDS EASILY: 0
CONSTITUTIONAL NEGATIVE: 1
WEAKNESS: 0
BACK PAIN: 0
FEVER: 0
COUGH: 0
CHILLS: 0
MYALGIAS: 0
ABDOMINAL PAIN: 0
RESPIRATORY NEGATIVE: 1
MUSCULOSKELETAL NEGATIVE: 1
GASTROINTESTINAL NEGATIVE: 1
FLANK PAIN: 0
DIZZINESS: 0
VOMITING: 0
NEUROLOGICAL NEGATIVE: 1

## 2019-12-10 NOTE — ED NOTES
"Assumed care of patient. Patient AOx4, breathing regular and unlabored, denies pain. Pt states \"I have not been able to control my blood pressure over the last few days\". States BP was high yesterday so took 2 doses of lisinopril instead of 1 but pressure still elevated. Pt denies current CP/headache/dizziness. Resting comfortably, no questions, will continue to monitor.   "

## 2019-12-10 NOTE — ED TRIAGE NOTES
"Chief Complaint   Patient presents with   • Hypertension     \"i am having a hard time regulating my blood pressure.\"   • Fatigue   Denies recent illness, she reports no chest pain today but that she had some mild pain yesterday.  "

## 2019-12-10 NOTE — ED NOTES
Med rec updated and complete  Allergies reviewed  Interviewed pt with  at bedside with permission from pt.  Pt reports no antibiotics in the last 2 weeks

## 2019-12-10 NOTE — ED PROVIDER NOTES
"ED Provider Note  CHIEF COMPLAINT  Chief Complaint   Patient presents with   • Hypertension     \"i am having a hard time regulating my blood pressure.\"   • Fatigue       HPI  Donna Isaac is a 54 y.o. female who presents to the ER with complaints of \"I am having a hard time controlling my blood pressure.\"  Yesterday patient's blood pressure was 210/160.  She contacted Dr. Carlson, her cardiologist, who told her to go to the emergency department.  She also had an episode of chest pain yesterday when her blood pressure was running that high.  The chest pain lasted about 30 minutes and then resolved.  The episode of chest pain was not associated with nausea, dizziness or shortness of breath.  No diaphoresis.  Instead of coming to the ER, the patient decided to take an extra dose of her blood pressure medication.  She does not recall what it is but tells me she is only on one blood pressure medication at this time and it was not particular 1 that she took.  This morning when she got up she felt very tired.  She said she had a 2-hour long period of time this morning where her blood pressure was running in the mid 80s systolic.  She felt lightheaded and weak during this time.  This afternoon her blood pressure started spiking up again into the 150/120 systolic range.  It is for this reason that she decides to come to the ER today.  Patient states that this morning she had a headache but she thinks is related to her chronic neck pain.  No vision changes.  No shortness of breath with exertion.  No orthopnea.    REVIEW OF SYSTEMS  See HPI for further details.  Positive for chest pain, hypotension, hypertension, fatigue, lightheadedness, chronic neck pain, headache.  Negative for nausea, vomiting, abdominal pain, diaphoresis, shortness of breath.  All other systems are negative.    PAST MEDICAL HISTORY  Past Medical History:   Diagnosis Date   • AAA (abdominal aortic aneurysm) (HCC)    • Asthma    • Chronic pain "    • Congestive heart failure (HCC)     Cardiologist, Dr. Carlson   • Fibromyalgia    • GERD (gastroesophageal reflux disease)    • Hypertension    • Migraine    • Osteoporosis    • Renal disorder     CKD   • Urticaria        FAMILY HISTORY  Family History   Problem Relation Age of Onset   • Heart Disease Mother    • Diabetes Mother    • Heart Failure Mother    • Hypertension Mother    • Hypertension Father    • Heart Disease Brother    • Heart Attack Brother    • Hypertension Brother        SOCIAL HISTORY  Social History     Socioeconomic History   • Marital status:      Spouse name: Not on file   • Number of children: Not on file   • Years of education: Not on file   • Highest education level: Not on file   Occupational History   • Not on file   Social Needs   • Financial resource strain: Not on file   • Food insecurity:     Worry: Never true     Inability: Never true   • Transportation needs:     Medical: No     Non-medical: No   Tobacco Use   • Smoking status: Never Smoker   • Smokeless tobacco: Never Used   Substance and Sexual Activity   • Alcohol use: Yes     Frequency: 2-4 times a month     Drinks per session: 1 or 2     Binge frequency: Never     Comment: 1-2 /month    • Drug use: No   • Sexual activity: Not on file   Lifestyle   • Physical activity:     Days per week: Not on file     Minutes per session: Not on file   • Stress: Not on file   Relationships   • Social connections:     Talks on phone: Not on file     Gets together: Not on file     Attends Holiness service: Not on file     Active member of club or organization: Not on file     Attends meetings of clubs or organizations: Not on file     Relationship status: Not on file   • Intimate partner violence:     Fear of current or ex partner: Not on file     Emotionally abused: Not on file     Physically abused: Not on file     Forced sexual activity: Not on file   Other Topics Concern   • Not on file   Social History Narrative   • Not on  "file       SURGICAL HISTORY  Past Surgical History:   Procedure Laterality Date   • SHOULDER DECOMPRESSION ARTHROSCOPIC Left 2/19/2019    Procedure: SHOULDER DECOMPRESSION ARTHROSCOPIC - SUBACROMIAL;  Surgeon: Camila Elkins M.D.;  Location: SURGERY [facility];  Service: Orthopedics   • CLAVICLE DISTAL EXCISION Left 2/19/2019    Procedure: CLAVICLE DISTAL EXCISION;  Surgeon: Camila Elkins M.D.;  Location: SURGERY [facility];  Service: Orthopedics   • SHOULDER ARTHROSCOPY W/ BICIPITAL TENODESIS REPAIR Left 2/19/2019    Procedure: SHOULDER ARTHROSCOPY W/ BICIPITAL TENODESIS REPAIR;  Surgeon: Camila Elkins M.D.;  Location: SURGERY [facility];  Service: Orthopedics   • GASTROSCOPY N/A 2/7/2019    Procedure: GASTROSCOPY- DIALATION AND BIOPSY;  Surgeon: Hola Veliz M.D.;  Location: [facility];  Service: Gastroenterology   • ABDOMINAL EXPLORATION  2002   • GASTRIC BYPASS LAPAROSCOPIC  1999   • HYSTERECTOMY, TOTAL ABDOMINAL  1995   • APPENDECTOMY  1974       CURRENT MEDICATIONS  Home Medications    **Home medications have not yet been reviewed for this encounter**         ALLERGIES  Allergies   Allergen Reactions   • Bactrim [Sulfamethoxazole W-Trimethoprim] Shortness of Breath   • Bee Venom Anaphylaxis   • Econazole Anaphylaxis   • Floxin [Ofloxacin] Anaphylaxis, Shortness of Breath and Swelling     Cause throat swelling and difficulty breathing   • Gadolinium Derivatives Hives and Swelling     Throat swelling   • Iodine Shortness of Breath and Anaphylaxis     Throat and tongue swelling with IV contrast   • Keflex Shortness of Breath   • Levaquin Shortness of Breath     anaphlyaxis   • Levofloxacin Shortness of Breath   • Morphine Anaphylaxis   • Naloxone Hives     \"hives, SOB\"   • Nitrofurantoin Shortness of Breath     ...and hives     • Norco [Apap-Fd&C Yellow #10 Al Tai-Hydrocodone] Shortness of Breath     ...and hives     • Oxycodone Shortness of Breath     ...and " "hives     • Penicillins Shortness of Breath     ...and hives     • Tape Contact Dermatitis     Blisters, clear tegaderm ok.. No steristrips.   • Ancef [Cefazolin] Hives   • Bextra [Valdecoxib] Rash     \"Skin burn and peeling.\"   • Flagyl [Kdc:Metronidazole+Tartrazine] Hives   • Linezolid Rash     Rash all over body   • Other Drug Hives     \"Enconazole nitrate.\" shortness of breath and hives   • Other Misc Unspecified     Adhesive tape: blisters, skin peels off   • Buprenorphine Anaphylaxis   • Clindamycin      HIVES     • Tygacil [Tigecycline]      itching   • Zithromax [Azithromycin] Hives and Shortness of Breath     Pt had Hives also       PHYSICAL EXAM  VITAL SIGNS: BP (!) 185/120   Pulse (!) 103   Temp 37 °C (98.6 °F) (Temporal)   Resp 20   Ht 1.626 m (5' 4\")   Wt 82.1 kg (181 lb)   LMP 02/07/2016 (Within Months)   SpO2 96%   BMI 31.07 kg/m²    Constitutional: Well developed, well nourished; No acute distress; Non-toxic appearance.   HENT: Normocephalic, atraumatic; Bilateral external ears normal; Oropharynx with slightly dry mucous membranes; No erythema or exudates in the posterior oropharynx.  TMs are perforated bilaterally.  The patient states the right has a chronic perforation.  She says she is not aware that the left is perforated.  There is no erythema or purulent drainage of the left ear canal.  Eyes: PERRL, EOMI, Conjunctiva normal. No discharge.   Neck:  Supple, nontender midline; No stridor; No nuchal rigidity.   Lymphatic: No cervical lymphadenopathy noted.   Cardiovascular: Regular rate and rhythm without murmurs, rubs, or gallop.   Thorax & Lungs: No respiratory distress, breath sounds clear to auscultation bilaterally without wheezing, rales or rhonchi. Nontender chest wall. No crepitus or subcutaneous air  Abdomen: Soft, nontender, bowel sounds normal. No obvious masses; No pulsatile masses; no rebound, guarding, or peritoneal signs.   Skin: Good color; warm and dry without rash or " petechia.  Back: Nontender, No CVA tenderness.   Extremities: Distal dorsalis pedis, posterior tibial pulses are equal bilaterally; No edema; Nontender calves or saphenous, No cyanosis, No clubbing.   Musculoskeletal: Good range of motion in all major joints. No tenderness to palpation or major deformities noted.   Neurologic: Alert & oriented x 4, clear speech.      EKG  Please see my EKG interpretation below    RADIOLOGY/PROCEDURES  CT-HEAD W/O   Final Result      No acute intracranial abnormality.      Mild chronic sinus disease.      DX-CHEST-PORTABLE (1 VIEW)   Final Result      No radiographic evidence of acute cardiopulmonary process.        COURSE & MEDICAL DECISION MAKING  Pertinent Labs & Imaging studies reviewed. (See chart for details)        Results for orders placed or performed during the hospital encounter of 12/10/19   CBC WITH DIFFERENTIAL   Result Value Ref Range    WBC 5.5 4.8 - 10.8 K/uL    RBC 5.47 (H) 4.20 - 5.40 M/uL    Hemoglobin 15.1 12.0 - 16.0 g/dL    Hematocrit 47.6 (H) 37.0 - 47.0 %    MCV 87.0 81.4 - 97.8 fL    MCH 27.6 27.0 - 33.0 pg    MCHC 31.7 (L) 33.6 - 35.0 g/dL    RDW 47.0 35.9 - 50.0 fL    Platelet Count 286 164 - 446 K/uL    MPV 9.8 9.0 - 12.9 fL    Neutrophils-Polys 55.50 44.00 - 72.00 %    Lymphocytes 31.20 22.00 - 41.00 %    Monocytes 11.40 0.00 - 13.40 %    Eosinophils 1.30 0.00 - 6.90 %    Basophils 0.40 0.00 - 1.80 %    Immature Granulocytes 0.20 0.00 - 0.90 %    Nucleated RBC 0.00 /100 WBC    Neutrophils (Absolute) 3.03 2.00 - 7.15 K/uL    Lymphs (Absolute) 1.70 1.00 - 4.80 K/uL    Monos (Absolute) 0.62 0.00 - 0.85 K/uL    Eos (Absolute) 0.07 0.00 - 0.51 K/uL    Baso (Absolute) 0.02 0.00 - 0.12 K/uL    Immature Granulocytes (abs) 0.01 0.00 - 0.11 K/uL    NRBC (Absolute) 0.00 K/uL   COMP METABOLIC PANEL   Result Value Ref Range    Sodium 138 135 - 145 mmol/L    Potassium 4.0 3.6 - 5.5 mmol/L    Chloride 101 96 - 112 mmol/L    Co2 22 20 - 33 mmol/L    Anion Gap 15.0 (H)  0.0 - 11.9    Glucose 91 65 - 99 mg/dL    Bun 16 8 - 22 mg/dL    Creatinine 1.14 0.50 - 1.40 mg/dL    Calcium 9.2 8.4 - 10.2 mg/dL    AST(SGOT) 29 12 - 45 U/L    ALT(SGPT) 28 2 - 50 U/L    Alkaline Phosphatase 92 30 - 99 U/L    Total Bilirubin 0.2 0.1 - 1.5 mg/dL    Albumin 4.2 3.2 - 4.9 g/dL    Total Protein 7.0 6.0 - 8.2 g/dL    Globulin 2.8 1.9 - 3.5 g/dL    A-G Ratio 1.5 g/dL   TROPONIN   Result Value Ref Range    Troponin T 6 6 - 19 ng/L   proBrain Natriuretic Peptide, NT   Result Value Ref Range    NT-proBNP 153 (H) 0 - 125 pg/mL   URINALYSIS CULTURE, IF INDICATED   Result Value Ref Range    Color Yellow     Character Clear     Specific Gravity 1.010 <1.035    Ph 5.5 5.0 - 8.0    Glucose 500 (A) Negative mg/dL    Ketones Negative Negative mg/dL    Protein Negative Negative mg/dL    Bilirubin Negative Negative    Nitrite Negative Negative    Leukocyte Esterase Negative Negative    Occult Blood Negative Negative    Micro Urine Req see below    APTT   Result Value Ref Range    APTT 28.6 24.7 - 36.0 sec   PROTHROMBIN TIME (INR)   Result Value Ref Range    PT 12.3 12.0 - 14.6 sec    INR 0.91 0.87 - 1.13   ESTIMATED GFR   Result Value Ref Range    GFR If African American 60 >60 mL/min/1.73 m 2    GFR If Non African American 49 (A) >60 mL/min/1.73 m 2   TSH WITH REFLEX TO FT4   Result Value Ref Range    TSH 1.100 0.380 - 5.330 uIU/mL   EKG   Result Value Ref Range    Report       [facility]    Test Date:  2019-12-10  Pt Name:    CARMINA MORAN              Department: EDS  MRN:        [id number]                      Room:       -ROOM 4  Gender:     Female                       Technician: DARLEEN  :        [id number]                   Requested By:ER TRIAGE PROTOCOL  Order #:    [id number]                    Reading MD: Bonnie Black    Measurements  Intervals                                Axis  Rate:       91                           P:          10  ID:         [id number]                           QRS:        -17  QRSD:       86                           T:          141  QT:         340  QTc:        419    Interpretive Statements  Sinus rhythm rate 91  Normal axis  T wave inverted I, AVL, V2 how was that she is  No ST elevation or depression  Prolonged RI interval  LVH with secondary repolarization abnormality  Baseline wander in lead(s) V3  Compared to ECG 11/10/2019 22:19:40  First degree AV block now present  Early repolarizat ion now present  Possible ischemia no longer present  Electronically Signed On 12- 15:47:15 PST by Bonnie Black       Patient presents to the ER complaining of erratic blood pressure.  She was made it here at [facility] several weeks ago for hypotension.  A few of her blood pressure medications were held and currently she is just on carvedilol.  Since being discharged in the hospital her blood pressures been running high.  Yesterday it was 210/160.  She contacted her cardiologist who told her to go to the ER.  Instead of going to the ER she took an extra dose of her carvedilol.  This morning her blood pressure was in the mid 80s for several hours.  Yesterday when her blood pressure was 210/160, she had chest pain.  Chest pain lasted about half hour at a time and then resolved.  No chest pain today.  Today when her blood pressure was in the mid 80s systolic she felt lightheaded and very weak.  Here in the ER her blood pressure is rather high.  Have given her several rounds of labetalol IV without any improvement in her blood pressure.  I then gave her a dose of IV hydralazine.  She still is running in the 180s systolic.  She has no chest pain at this time.  EKG does not reveal any acute abnormalities.  No ST elevation or depression.  CT brain is negative.  No evidence for head bleed.  No proteinuria and renal function is normal.  At this time the patient has received 3 doses of IV antihypertensives.  Resolving a hard time controlling her blood pressure.  I think it  is best that she come into the hospital overnight for blood pressure management.  I spoke with Dr. Saleh, hospitalist on-call.  She will kindly evaluate the patient for hospitalization for hypertensive urgency/emergency.      CRITICAL CARE  The very real possibilty of a deterioration of this patient's condition required the highest level of my preparedness for sudden, emergent intervention.  I provided critical care services, which included medication orders, frequent reevaluations of the patient's condition and response to treatment, ordering and reviewing test results, and discussing the case with various consultants.  The critical care time associated with the care of the patient was 35 minutes. Review chart for interventions. This time is exclusive of any other billable procedures.       FINAL IMPRESSION  1. Hypertensive urgency Acute    2. Chest pain, unspecified type Acute       The critical care time associated with the care of the patient was 35 minutes. Review chart for interventions. This time is exclusive of any other billable procedures.     This dictation has been created using voice recognition software. The accuracy of the dictation is limited by the abilities of the software. I expect there may be some errors of grammar and possibly content. I made every attempt to manually correct the errors within my dictation. However, errors related to voice recognition software may still exist and should be interpreted within the appropriate context.  Electronically signed by: Bonnie Black, 12/10/2019 2:17 PM

## 2019-12-11 ENCOUNTER — PATIENT OUTREACH (OUTPATIENT)
Dept: HEALTH INFORMATION MANAGEMENT | Facility: OTHER | Age: 54
End: 2019-12-11

## 2019-12-11 VITALS
SYSTOLIC BLOOD PRESSURE: 155 MMHG | WEIGHT: 180.78 LBS | TEMPERATURE: 97.9 F | OXYGEN SATURATION: 93 % | HEIGHT: 64 IN | RESPIRATION RATE: 18 BRPM | BODY MASS INDEX: 30.86 KG/M2 | DIASTOLIC BLOOD PRESSURE: 105 MMHG | HEART RATE: 70 BPM

## 2019-12-11 PROBLEM — I16.9 HYPERTENSIVE CRISIS: Status: RESOLVED | Noted: 2018-09-26 | Resolved: 2019-12-11

## 2019-12-11 PROCEDURE — 700102 HCHG RX REV CODE 250 W/ 637 OVERRIDE(OP): Performed by: HOSPITALIST

## 2019-12-11 PROCEDURE — 99217 PR OBSERVATION CARE DISCHARGE: CPT | Performed by: INTERNAL MEDICINE

## 2019-12-11 PROCEDURE — A9270 NON-COVERED ITEM OR SERVICE: HCPCS | Performed by: HOSPITALIST

## 2019-12-11 PROCEDURE — G0378 HOSPITAL OBSERVATION PER HR: HCPCS

## 2019-12-11 RX ORDER — LOSARTAN POTASSIUM 100 MG/1
100 TABLET ORAL DAILY
Qty: 30 TAB | Refills: 2 | Status: ON HOLD | OUTPATIENT
Start: 2019-12-12 | End: 2021-05-15

## 2019-12-11 RX ORDER — FUROSEMIDE 10 MG/ML
INJECTION INTRAMUSCULAR; INTRAVENOUS
Status: DISCONTINUED
Start: 2019-12-11 | End: 2019-12-11

## 2019-12-11 RX ORDER — HYDRALAZINE HYDROCHLORIDE 10 MG/1
10 TABLET, FILM COATED ORAL 3 TIMES DAILY PRN
Qty: 90 TAB | Refills: 1 | Status: SHIPPED | OUTPATIENT
Start: 2019-12-11 | End: 2020-01-21

## 2019-12-11 RX ADMIN — CARVEDILOL 6.25 MG: 6.25 TABLET, FILM COATED ORAL at 12:09

## 2019-12-11 RX ADMIN — GABAPENTIN 400 MG: 400 CAPSULE ORAL at 05:04

## 2019-12-11 RX ADMIN — ESTRADIOL 0.5 MG: 1 TABLET ORAL at 05:05

## 2019-12-11 RX ADMIN — LOSARTAN POTASSIUM 100 MG: 25 TABLET ORAL at 05:04

## 2019-12-11 RX ADMIN — FUROSEMIDE 10 MG: 20 TABLET ORAL at 12:09

## 2019-12-11 RX ADMIN — COLESEVELAM HYDROCHLORIDE 625 MG: 625 TABLET, FILM COATED ORAL at 12:15

## 2019-12-11 RX ADMIN — GABAPENTIN 400 MG: 400 CAPSULE ORAL at 12:15

## 2019-12-11 RX ADMIN — LIOTHYRONINE SODIUM 25 MCG: 5 TABLET ORAL at 05:04

## 2019-12-11 NOTE — DISCHARGE INSTRUCTIONS
Discharge Instructions    Discharged to home by car with relative. Discharged via walking, hospital escort: Refused.  Special equipment needed: Not Applicable    Be sure to schedule a follow-up appointment with your primary care doctor or any specialists as instructed.     Discharge Plan:   Influenza Vaccine Indication: Not indicated: Previously immunized this influenza season and > 8 years of age    I understand that a diet low in cholesterol, fat, and sodium is recommended for good health. Unless I have been given specific instructions below for another diet, I accept this instruction as my diet prescription.   Other diet: Cardiac    Special Instructions: None    · Is patient discharged on Warfarin / Coumadin?   No     Depression / Suicide Risk    As you are discharged from this [facility] facility, it is important to learn how to keep safe from harming yourself.    Recognize the warning signs:  · Abrupt changes in personality, positive or negative- including increase in energy   · Giving away possessions  · Change in eating patterns- significant weight changes-  positive or negative  · Change in sleeping patterns- unable to sleep or sleeping all the time   · Unwillingness or inability to communicate  · Depression  · Unusual sadness, discouragement and loneliness  · Talk of wanting to die  · Neglect of personal appearance   · Rebelliousness- reckless behavior  · Withdrawal from people/activities they love  · Confusion- inability to concentrate     If you or a loved one observes any of these behaviors or has concerns about self-harm, here's what you can do:  · Talk about it- your feelings and reasons for harming yourself  · Remove any means that you might use to hurt yourself (examples: pills, rope, extension cords, firearm)  · Get professional help from the community (Mental Health, Substance Abuse, psychological counseling)  · Do not be alone:Call your Safe Contact- someone whom you trust who will be there for  you.  · Call your local CRISIS HOTLINE 918-5798 or 997-811-1765  · Call your local Children's Mobile Crisis Response Team [place] (035) 891-2539 or www.[employer]  · Call the toll free National Suicide Prevention Hotlines   · National Suicide Prevention Lifeline 681-074-UNMB (0030)  · [place] [facility] 800-SUICIDE (153-5494)          Discharge Instructions per Kelli Fox M.D.    Follow up with cardiology for repeat echocardiogram and appointment in December as scheduled    DIET: cardiac    ACTIVITY: as tolerated    DIAGNOSIS: hypertensive urgency    Return to ER if chest pain recurs

## 2019-12-11 NOTE — PROGRESS NOTES
1950: Pt arrived to unit via gurney. Ambulated from gurney to bed, no assist, steady gait. Tele monitor applied, vitals taken. Pt assessed. A&O x4. Admit profile and med rec complete. Discussed POC with pt, including monitoring BP, giving prn antihypertensives. Welcome folder provided and discussed. Communication board filled out. Questions and concerns addressed, verbalized understanding. Fall precautions in place.   2115: pt remains hypertensive SBP 160s. C/o 5/10 posterior migraine, prn soma given.   0200: bp now 145/90. Migraine now 2/10, dull.   0700: Report given to AILYN Martin.

## 2019-12-11 NOTE — CARE PLAN
Problem: Communication  Goal: The ability to communicate needs accurately and effectively will improve  Outcome: PROGRESSING AS EXPECTED  Note:   AO4 able to verbalize needs clearly. Demonstrates appropriate use of call light.        Problem: Safety  Goal: Will remain free from falls  Outcome: PROGRESSING AS EXPECTED  Intervention: Assess risk factors for falls  Flowsheets (Taken 12/10/2019 2313)  Pt Calls for Assistance: Yes  Fall Risk: Risk to Fall -  0 - 1 point  History of fall: 2  Mobility Status Assessment: 0-Ambulates & Transfers Independently. No Assistance Required  Risk for Injury-Any positive answers results in the pt being at high risk for fall related injury: Not Applicable  Intervention: Implement fall precautions  Flowsheets (Taken 12/10/2019 2313)  Environmental Precautions: Treaded Slipper Socks on Patient; Personal Belongings, Wastebasket, Call Bell etc. in Easy Reach; Transferred to Stronger Side; Report Given to Other Health Care Providers Regarding Fall Risk; Bed in Low Position; Communication Sign for Patients & Families; Mobility Assessed & Appropriate Sign Placed

## 2019-12-11 NOTE — PROGRESS NOTES
Telemetry Shift Summary    Rhythm SR  HR Range 70-90s  Ectopy  Per [facility]: rare PVC    Measurements for strip printed 12/10/2019 at 1957:   HR 86     0.14/0.08/0.34        Normal Values  Rhythm SR  HR Range    Measurements 0.12-0.20 / 0.06-0.10  / 0.30-0.52

## 2019-12-11 NOTE — ASSESSMENT & PLAN NOTE
She is not in acute heart failure at this time.  History of stress-induced cardiomyopathy with latest ejection fraction measured at 20%.  Continue losartan, spironolactone, increase to 100 mg daily, continue low-dose oral Lasix, carvedilol which she is not tolerating well, and WelChol  Outpatient cardiac follow-up as well

## 2019-12-11 NOTE — ASSESSMENT & PLAN NOTE
Symptomatic with chest pain.  Continue spironolactone, losartan 100 mg daily, furosemide and carvedilol.  Losartan and spironolactone increased to 100 mg.  Patient given labetalol in the emergency department, her blood pressure is better and she does have resolution of chest pain, hydralazine IV 20 mg every 6 hours as needed for elevated blood pressure.  Monitor on telemetry

## 2019-12-11 NOTE — CARE PLAN
Losartan 100 mg daily added for blood pressure as well as hydrazine prn for SBP greater than 180 tid.  Follow up appointments scheduled and will discharge in the next hour.

## 2019-12-11 NOTE — H&P
Hospital Medicine History & Physical Note    Date of Service  12/10/2019    Primary Care Physician  Madisyn Mccullough M.D.    Consultants  none    Code Status  full    Chief Complaint  Chest pain, high blood pressure.    History of Presenting Illness  54 y.o. female who presented 12/10/2019 with a blood pressure and chest pain.  Patient was admitted several times recently secondary to cardiac issues.  The first admission she was found to have worsening of Takotsubo type cardiomyopathy with a decrease in her ejection fraction to 20%.  Cardiology was consulted at that time and recommended carvedilol, Aldactone, losartan, Lasix and blood pressure control.  Her blood pressures improved subsequent to that she was seen in the cardiology office in follow-up in her medications were kept the same.  Then she was admitted for some hypotensive episode and appeared to be a bit dehydrated, her Lasix was decreased and she did improve again.  However over the past 48 hours she has noted high blood pressures as high as 210 systolic and then the development of chest pain.  Chest pain is been nonradiating, it is been relieved by lowering the blood pressure with medications in the emergency department.  Patient denies any radiation of the pain into her arm or upper neck.  Currently the chest pain is resolved she has a slight dull headache.I discussed the presenting symptoms, physical examination, lab and radiological study results with the emergency department physician.      Review of Systems  Review of Systems   Constitutional: Negative.  Negative for chills, fever, malaise/fatigue and weight loss.   HENT: Negative.    Respiratory: Negative.  Negative for cough and shortness of breath.    Cardiovascular: Positive for chest pain. Negative for leg swelling.   Gastrointestinal: Negative.  Negative for abdominal pain, nausea and vomiting.   Genitourinary: Negative.  Negative for dysuria and flank pain.   Musculoskeletal: Negative.   Negative for back pain and myalgias.   Neurological: Negative.  Negative for dizziness, loss of consciousness and weakness.   Endo/Heme/Allergies: Negative.  Does not bruise/bleed easily.   Psychiatric/Behavioral: Negative.  Negative for depression. The patient is not nervous/anxious.    All other systems reviewed and are negative.      Past Medical History   has a past medical history of AAA (abdominal aortic aneurysm) ([facility]), Asthma, Chronic pain, Congestive heart failure ([facility]), Fibromyalgia, GERD (gastroesophageal reflux disease), Hypertension, Migraine, Osteoporosis, Renal disorder, and Urticaria.    Surgical History   has a past surgical history that includes appendectomy (1974); gastroscopy (N/A, 2/7/2019); hysterectomy, total abdominal (1995); gastric bypass laparoscopic (1999); abdominal exploration (2002); shoulder decompression arthroscopic (Left, 2/19/2019); clavicle distal excision (Left, 2/19/2019); and shoulder arthroscopy w/ bicipital tenodesis repair (Left, 2/19/2019).     Family History  family history includes Diabetes in her mother; Heart Attack in her brother; Heart Disease in her brother and mother; Heart Failure in her mother; Hypertension in her brother, father, and mother.     Social History   reports that she has never smoked. She has never used smokeless tobacco. She reports previous alcohol use. She reports that she does not use drugs.    Allergies  Allergies   Allergen Reactions   • Bactrim [Sulfamethoxazole W-Trimethoprim] Shortness of Breath   • Bee Venom Anaphylaxis   • Econazole Anaphylaxis   • Floxin [Ofloxacin] Anaphylaxis, Shortness of Breath and Swelling     Cause throat swelling and difficulty breathing   • Gadolinium Derivatives Hives and Swelling     Throat swelling   • Iodine Shortness of Breath and Anaphylaxis     Throat and tongue swelling with IV contrast   • Keflex Shortness of Breath   • Levaquin Shortness of Breath     anaphlyaxis   • Levofloxacin Shortness of Breath   •  "Morphine Anaphylaxis   • Naloxone Hives     \"hives, SOB\"   • Nitrofurantoin Shortness of Breath     ...and hives     • Norco [Apap-Fd&C Yellow #10 Al Tai-Hydrocodone] Shortness of Breath     ...and hives     • Oxycodone Shortness of Breath     ...and hives     • Penicillins Shortness of Breath     ...and hives     • Tape Contact Dermatitis     Blisters, clear tegaderm ok.. No steristrips.   • Ancef [Cefazolin] Hives   • Bextra [Valdecoxib] Rash     \"Skin burn and peeling.\"   • Flagyl [Kdc:Metronidazole+Tartrazine] Hives   • Linezolid Rash     Rash all over body   • Other Drug Hives     \"Enconazole nitrate.\" shortness of breath and hives   • Other Misc Unspecified     Adhesive tape: blisters, skin peels off   • Buprenorphine Anaphylaxis   • Clindamycin      HIVES     • Tygacil [Tigecycline]      itching   • Zithromax [Azithromycin] Hives and Shortness of Breath     Pt had Hives also       Medications  Prior to Admission Medications   Prescriptions Last Dose Informant Patient Reported? Taking?   AIMOVIG 70 MG/ML Solution Auto-injector 2019 at Unknown Patient Yes No   Si mL by Injection route Q30 DAYS.   Biotin 5000 MCG Cap 12/10/2019 at 1400 Patient Yes No   Sig: Take 5,000 mcg by mouth every day.   Cholecalciferol (VITAMIN D) 2000 units Cap 12/10/2019 at 1400 Patient Yes No   Sig: Take 2,000 Units by mouth every evening.   Coral Calcium 1000 (390 Ca) MG Tab 12/10/2019 at 0800 Patient Yes No   Sig: Take 1,000 mg by mouth every evening.   Cyanocobalamin (B-12) 1000 MCG/ML Kit 12/3/2019 at PM Patient Yes No   Si,000 mcg by Injection route every 7 days. On Tue   DULoxetine (CYMBALTA) 60 MG Cap DR Particles delayed-release capsule 2019 at 2300 Patient Yes No   Sig: Take 60 mg by mouth every evening.   EPINEPHrine (EPIPEN) 0.3 MG/0.3ML Solution Auto-injector solution for injection > 1 months at Unknown Patient Yes No   Si.3 mg by Intramuscular route Once. Indications: Life-Threatening " Hypersensitivity Reaction   FARXIGA 5 MG Tab 2019 at 2300 Patient Yes No   Sig: Take 5 mg by mouth every evening.   Frovatriptan Succinate (FROVA) 2.5 MG Tab 12/10/2019 at 0900 Patient Yes No   Sig: Take 2.5 mg by mouth as needed (For migraines).   Menaquinone-7 (VITAMIN K2) 100 MCG Cap 12/10/2019 at 0700 Patient Yes No   Sig: Take 1 Tab by mouth every day.   Non Formulary Request 2019 at 2300 Patient Yes Yes   Si.03 mg every day. Zomactin intramuscular injection   SYNTHROID 75 MCG Tab 2019 at 2300 Patient Yes No   Sig: Take 75 mcg by mouth every evening.   calcitonin, salmon, (MIACALCIN) 200 UNIT/ACT Solution 12/10/2019 at 1400 Patient Yes No   Sig: Spray 1 Spray in nose every bedtime.   carvedilol (COREG) 12.5 MG Tab 2019 at 2300 Patient No No   Sig: Take 0.5 Tabs by mouth 2 times a day, with meals.   cetirizine (KLS ALLER-HAWA) 10 MG Tab 2019 at 2300 Patient Yes No   Sig: Take 20 mg by mouth 2 times a day.   cevimeline (EVOXAC) 30 MG capsule 12/10/2019 at 1400 Patient No No   Sig: Take 1 Cap by mouth 3 times a day.   colesevelam (WELCHOL) 625 MG Tab 12/10/2019 at 1400 Patient Yes No   Sig: Take 625 mg by mouth 3 times a day, with meals.   estradiol (ESTRACE) 0.5 MG tablet 12/10/2019 at 0800 Patient Yes No   Sig: Take 0.5 mg by mouth every day.   furosemide (LASIX) 20 MG Tab 12/10/2019 at 1400 Patient No No   Sig: TAKE 0.5 TABS BY MOUTH 2 TIMES A DAY.   gabapentin (NEURONTIN) 400 MG Cap 12/10/2019 at 1400 Patient No No   Sig: Take 1 Cap by mouth 3 times a day.   hydrOXYzine HCl (ATARAX) 25 MG Tab 2019 at 2300 Patient Yes No   Sig: Take 25 mg by mouth every bedtime. For sleep   liothyronine (CYTOMEL) 25 MCG Tab 12/10/2019 at 0800 Patient Yes No   Sig: Take 25 mcg by mouth every day.   magnesium oxide (MAG-OX) 400 MG Tab 2019 at 2300 Patient Yes No   Sig: Take 400 mg by mouth every evening.   meloxicam (MOBIC) 15 MG tablet 2019 at 2300 Patient Yes No   Sig: Take 15 mg by  mouth every evening.   montelukast (SINGULAIR) 10 MG Tab 12/9/2019 at 2300 Patient Yes No   Sig: Take 10 mg by mouth every evening.   multivitamin (THERAGRAN) Tab 12/9/2019 at 1400 Patient Yes No   Sig: Take 1 Tab by mouth every day.   pantoprazole (PROTONIX) 40 MG Tablet Delayed Response 12/9/2019 at 2300 Patient Yes No   Sig: Take 40 mg by mouth every evening.   potassium chloride SA (KDUR) 20 MEQ Tab CR 12/9/2019 at 0700 Patient No No   Sig: Take 1 Tab by mouth every day.   spironolactone (ALDACTONE) 25 MG Tab 12/9/2019 at 2300 Patient Yes Yes   Sig: Take 25 mg by mouth every evening.   tizanidine (ZANAFLEX) 4 MG Tab 12/10/2019 at 0900 Patient Yes No   Sig: Take 2-4 mg by mouth every 6 hours as needed. Indications: Muscle Spasticity      Facility-Administered Medications: None       Physical Exam  Temp:  [36.8 °C (98.3 °F)-37 °C (98.6 °F)] 36.8 °C (98.3 °F)  Pulse:  [] 83  Resp:  [13-62] 18  BP: (142-205)/() 163/96  SpO2:  [92 %-97 %] 95 %    Physical Exam  Vitals signs and nursing note reviewed.   Constitutional:       General: She is not in acute distress.     Appearance: She is well-developed. She is not diaphoretic.   HENT:      Right Ear: External ear normal.      Left Ear: External ear normal.      Nose: Nose normal.      Mouth/Throat:      Pharynx: No oropharyngeal exudate.   Eyes:      General: No scleral icterus.        Right eye: No discharge.         Left eye: No discharge.      Conjunctiva/sclera: Conjunctivae normal.   Neck:      Vascular: No JVD.      Trachea: No tracheal deviation.   Cardiovascular:      Rate and Rhythm: Normal rate and regular rhythm.      Heart sounds: Normal heart sounds.   Pulmonary:      Effort: Pulmonary effort is normal. No respiratory distress.      Breath sounds: Normal breath sounds. No stridor. No wheezing or rales.   Chest:      Chest wall: No tenderness.   Abdominal:      General: Bowel sounds are normal. There is no distension.      Palpations: Abdomen is  soft.      Tenderness: There is no tenderness.   Musculoskeletal:         General: No tenderness.   Skin:     General: Skin is warm and dry.      Coloration: Skin is not pale.   Neurological:      Mental Status: She is alert.      Cranial Nerves: No cranial nerve deficit.      Motor: No abnormal muscle tone.   Psychiatric:         Behavior: Behavior normal.         Laboratory:  Recent Labs     12/10/19  1511   WBC 5.5   RBC 5.47*   HEMOGLOBIN 15.1   HEMATOCRIT 47.6*   MCV 87.0   MCH 27.6   MCHC 31.7*   RDW 47.0   PLATELETCT 286   MPV 9.8     Recent Labs     12/09/19  0837 12/10/19  1511   SODIUM 137 138   POTASSIUM 3.9 4.0   CHLORIDE 103 101   CO2 26 22   GLUCOSE 89 91   BUN 16 16   CREATININE 0.99 1.14   CALCIUM 9.3 9.2     Recent Labs     12/09/19  0837 12/10/19  1511   ALTSGPT 26 28   ASTSGOT 25 29   ALKPHOSPHAT 88 92   TBILIRUBIN 0.4 0.2   GLUCOSE 89 91     Recent Labs     12/10/19  1511   APTT 28.6   INR 0.91     Recent Labs     12/10/19  1511   NTPROBNP 153*         Recent Labs     12/10/19  1511   TROPONINT 6       Urinalysis:    Recent Labs     12/10/19  1650   SPECGRAVITY 1.010   GLUCOSEUR 500*   KETONES Negative   NITRITE Negative   LEUKESTERAS Negative        Imaging:  CT-HEAD W/O   Final Result      No acute intracranial abnormality.      Mild chronic sinus disease.      DX-CHEST-PORTABLE (1 VIEW)   Final Result      No radiographic evidence of acute cardiopulmonary process.            Assessment/Plan:  I anticipate this patient is appropriate for observation status at this time.    * Hypertensive crisis- (present on admission)  Assessment & Plan  Symptomatic with chest pain.  Continue spironolactone, losartan 100 mg daily, furosemide and carvedilol.  Losartan increased to 100 mg.  Patient given labetalol in the emergency department, her blood pressure is better and she does have resolution of chest pain, hydralazine IV 20 mg every 6 hours as needed for elevated blood pressure.  Monitor on  telemetry    Chronic systolic heart failure (HCC)- (present on admission)  Assessment & Plan  She is not in acute heart failure at this time.  History of stress-induced cardiomyopathy with latest ejection fraction measured at 20%.  Continue losartan, spironolactone, increase to 100 mg daily, continue low-dose oral Lasix, carvedilol which she is not tolerating well, and WelChol  Outpatient cardiac follow-up as well    CKD (chronic kidney disease) stage 3, GFR 30-59 ml/min ([facility])- (present on admission)  Assessment & Plan  At baseline, continue diuretics.        VTE prophylaxis: scd

## 2019-12-11 NOTE — DISCHARGE SUMMARY
"Discharge Summary    CHIEF COMPLAINT ON ADMISSION  Chief Complaint   Patient presents with   • Hypertension     \"i am having a hard time regulating my blood pressure.\"   • Fatigue       Reason for Admission  Blood Pressure Elevation    Admission Date  12/10/2019    CODE STATUS  Full    HPI & HOSPITAL COURSE  This is a 54 y.o. female here with elevated blood pressure measured at 210/160 at home, and chest pressure alleviated when her blood pressure comes down. She was treated with labetalol and then hydralazine in the emergency department. Her blood pressure improved to systolic in the 160 range on increased dose of losartan from 50mg to 100mg daily. Her chest pressure resolved with no recurrence and troponin was in the normal range. The patient is followed by cardiology Dr. Carlson and has a repeat echocardiogram scheduled for Dec 30 for Takosubo cardiomyopathy follow up. The patient was educated about checking her blood pressure and maneuvers she can take when it becomes elevated and when to come back to the hospital.        Therefore, she is discharged in good and stable condition to home with close outpatient follow-up.        Discharge Date  12/11/2019    FOLLOW UP ITEMS POST DISCHARGE  Cardiology Dr. Carlson for repeat echocardiogram Dec 30 as scheduled    DISCHARGE DIAGNOSES  Principal Problem (Resolved):    Hypertensive crisis POA: Yes  Active Problems:    Chronic systolic heart failure (HCC) POA: Yes    CKD (chronic kidney disease) stage 3, GFR 30-59 ml/min (HCC) POA: Yes      FOLLOW UP  Future Appointments   Date Time Provider Department Center   12/30/2019 10:15 AM [facility] ECHO 1 [facility]   1/2/2020 10:00 AM Adriana Carlson M.D. [facility] None   2/18/2020 10:40 AM [facility] None     Madisyn Mccullough M.D.  [address]  [address]  [place] [address]  [phone]          Adriana Carlson M.D.  [address]  [address]  [place] [address]  [phone]    On " 12/30/2019        MEDICATIONS ON DISCHARGE     Medication List      START taking these medications      Instructions   hydrALAZINE 10 MG Tabs  Commonly known as:  APRESOLINE   Take 1 Tab by mouth 3 times a day as needed (systolic blood pressure over 180).  Dose:  10 mg     losartan 100 MG Tabs  Start taking on:  December 12, 2019  Commonly known as:  COZAAR   Take 1 Tab by mouth every day.  Dose:  100 mg        CONTINUE taking these medications      Instructions   AIMOVIG 70 MG/ML Soaj  Generic drug:  Erenumab   1 mL by Injection route Q30 DAYS.  Dose:  1 mL     B-12 1000 MCG/ML Kit   1,000 mcg by Injection route every 7 days. On Tue  Dose:  1,000 mcg     Biotin 5000 MCG Caps   Take 5,000 mcg by mouth every day.  Dose:  5,000 mcg     calcitonin (salmon) 200 UNIT/ACT Soln  Commonly known as:  MIACALCIN   Spray 1 Spray in nose every bedtime.  Dose:  1 Spray     carvedilol 12.5 MG Tabs  Commonly known as:  COREG   Take 0.5 Tabs by mouth 2 times a day, with meals.  Dose:  6.25 mg     cevimeline 30 MG capsule  Commonly known as:  EVOXAC   Take 1 Cap by mouth 3 times a day.  Dose:  30 mg     colesevelam 625 MG Tabs  Commonly known as:  WELCHOL   Take 625 mg by mouth 3 times a day, with meals.  Dose:  625 mg     Coral Calcium 1000 (390 Ca) MG Tabs   Take 1,000 mg by mouth every evening.  Dose:  1,000 mg     DULoxetine 60 MG Cpep delayed-release capsule  Commonly known as:  CYMBALTA   Take 60 mg by mouth every evening.  Dose:  60 mg     EPINEPHrine 0.3 MG/0.3ML Soaj solution for injection  Commonly known as:  EPIPEN   0.3 mg by Intramuscular route Once. Indications: Life-Threatening Hypersensitivity Reaction  Dose:  0.3 mg     estradiol 0.5 MG tablet  Commonly known as:  ESTRACE   Take 0.5 mg by mouth every day.  Dose:  0.5 mg     FARXIGA 5 MG Tabs  Generic drug:  Dapagliflozin Propanediol   Take 5 mg by mouth every evening.  Dose:  5 mg     FROVA 2.5 MG Tabs  Generic drug:  Frovatriptan Succinate   Take 2.5 mg by mouth as  needed (For migraines).  Dose:  2.5 mg     furosemide 20 MG Tabs  Commonly known as:  LASIX   TAKE 0.5 TABS BY MOUTH 2 TIMES A DAY.  Dose:  10 mg     gabapentin 400 MG Caps  Commonly known as:  NEURONTIN   Take 1 Cap by mouth 3 times a day.  Dose:  400 mg     hydrOXYzine HCl 25 MG Tabs  Commonly known as:  ATARAX   Take 25 mg by mouth every bedtime. For sleep  Dose:  25 mg     KLS ALLER-HAWA 10 MG Tabs  Generic drug:  cetirizine   Take 20 mg by mouth 2 times a day.  Dose:  20 mg     liothyronine 25 MCG Tabs  Commonly known as:  CYTOMEL   Take 25 mcg by mouth every day.  Dose:  25 mcg     magnesium oxide 400 MG Tabs  Commonly known as:  MAG-OX   Take 400 mg by mouth every evening.  Dose:  400 mg     MOBIC 15 MG tablet  Generic drug:  meloxicam   Take 15 mg by mouth every evening.  Dose:  15 mg     montelukast 10 MG Tabs  Commonly known as:  SINGULAIR   Take 10 mg by mouth every evening.  Dose:  10 mg     multivitamin Tabs   Take 1 Tab by mouth every day.  Dose:  1 Tab     Non Formulary Request   0.03 mg every day. Zomactin intramuscular injection  Dose:  0.03 mg     pantoprazole 40 MG Tbec  Commonly known as:  PROTONIX   Take 40 mg by mouth every evening.  Dose:  40 mg     potassium chloride SA 20 MEQ Tbcr  Commonly known as:  Kdur   Take 1 Tab by mouth every day.  Dose:  20 mEq     spironolactone 25 MG Tabs  Commonly known as:  ALDACTONE   Take 25 mg by mouth every evening.  Dose:  25 mg     SYNTHROID 75 MCG Tabs  Generic drug:  levothyroxine   Take 75 mcg by mouth every evening.  Dose:  75 mcg     tizanidine 4 MG Tabs  Commonly known as:  ZANAFLEX   Take 2-4 mg by mouth every 6 hours as needed. Indications: Muscle Spasticity  Dose:  2-4 mg     Vitamin D 2000 units Caps   Take 2,000 Units by mouth every evening.  Dose:  2,000 Units     Vitamin K2 100 MCG Caps   Take 1 Tab by mouth every day.  Dose:  1 Tab            Allergies  Allergies   Allergen Reactions   • Bactrim [Sulfamethoxazole W-Trimethoprim] Shortness of  "Breath   • Bee Venom Anaphylaxis   • Econazole Anaphylaxis   • Floxin [Ofloxacin] Anaphylaxis, Shortness of Breath and Swelling     Cause throat swelling and difficulty breathing   • Gadolinium Derivatives Hives and Swelling     Throat swelling   • Iodine Shortness of Breath and Anaphylaxis     Throat and tongue swelling with IV contrast   • Keflex Shortness of Breath   • Levaquin Shortness of Breath     anaphlyaxis   • Levofloxacin Shortness of Breath   • Morphine Anaphylaxis   • Naloxone Hives     \"hives, SOB\"   • Nitrofurantoin Shortness of Breath     ...and hives     • Norco [Apap-Fd&C Yellow #10 Al Tai-Hydrocodone] Shortness of Breath     ...and hives     • Oxycodone Shortness of Breath     ...and hives     • Penicillins Shortness of Breath     ...and hives     • Tape Contact Dermatitis     Blisters, clear tegaderm ok.. No steristrips.   • Ancef [Cefazolin] Hives   • Bextra [Valdecoxib] Rash     \"Skin burn and peeling.\"   • Flagyl [Kdc:Metronidazole+Tartrazine] Hives   • Linezolid Rash     Rash all over body   • Other Drug Hives     \"Enconazole nitrate.\" shortness of breath and hives   • Other Misc Unspecified     Adhesive tape: blisters, skin peels off   • Buprenorphine Anaphylaxis   • Clindamycin      HIVES     • Tygacil [Tigecycline]      itching   • Zithromax [Azithromycin] Hives and Shortness of Breath     Pt had Hives also       DIET  Orders Placed This Encounter   Procedures   • Diet Order Regular     Standing Status:   Standing     Number of Occurrences:   1     Order Specific Question:   Diet:     Answer:   Regular [1]       ACTIVITY  As tolerated.  Weight bearing as tolerated    CONSULTATIONS  none    PROCEDURES  none    LABORATORY  Lab Results   Component Value Date    SODIUM 138 12/10/2019    POTASSIUM 4.0 12/10/2019    CHLORIDE 101 12/10/2019    CO2 22 12/10/2019    GLUCOSE 91 12/10/2019    BUN 16 12/10/2019    CREATININE 1.14 12/10/2019        Lab Results   Component Value Date    WBC 5.5 " 12/10/2019    HEMOGLOBIN 15.1 12/10/2019    HEMATOCRIT 47.6 (H) 12/10/2019    PLATELETCT 286 12/10/2019        Total time of the discharge process exceeds 23 minutes.

## 2019-12-11 NOTE — PROGRESS NOTES
2 RN skin check completed with Miko RN  Devices in place N/A.  Skin assessed under devices N/A.  Confirmed pressure ulcers found on N/A.  New potential pressure ulcers noted on N/A. Wound consult placed N/A.  The following interventions in place Pt ambulating occasionally, able to turn and reposition self.  Skin CDI, no redness over open areas.  Small scattered bruises in various stages of healing to BLE and abdomen.

## 2019-12-17 LAB — FGF-23 PLAS-ACNC: 243 RU/ML

## 2019-12-21 LAB — ANGIOTENSIN II 5912: 36 NG/L

## 2019-12-23 ENCOUNTER — HOSPITAL ENCOUNTER (OUTPATIENT)
Dept: LAB | Facility: MEDICAL CENTER | Age: 54
End: 2019-12-23
Attending: INTERNAL MEDICINE
Payer: MEDICARE

## 2019-12-23 LAB
ALBUMIN SERPL BCP-MCNC: 4.6 G/DL (ref 3.2–4.9)
ALBUMIN/GLOB SERPL: 1.6 G/DL
ALP SERPL-CCNC: 87 U/L (ref 30–99)
ALT SERPL-CCNC: 43 U/L (ref 2–50)
ANION GAP SERPL CALC-SCNC: 10 MMOL/L (ref 0–11.9)
AST SERPL-CCNC: 26 U/L (ref 12–45)
BILIRUB SERPL-MCNC: 0.4 MG/DL (ref 0.1–1.5)
BUN SERPL-MCNC: 12 MG/DL (ref 8–22)
CALCIUM SERPL-MCNC: 10 MG/DL (ref 8.5–10.5)
CHLORIDE SERPL-SCNC: 101 MMOL/L (ref 96–112)
CO2 SERPL-SCNC: 26 MMOL/L (ref 20–33)
CORTIS SERPL-MCNC: 0.7 UG/DL (ref 0–23)
CREAT SERPL-MCNC: 1.04 MG/DL (ref 0.5–1.4)
CREAT UR-MCNC: 63.5 MG/DL
GLOBULIN SER CALC-MCNC: 2.8 G/DL (ref 1.9–3.5)
GLUCOSE SERPL-MCNC: 111 MG/DL (ref 65–99)
PHOSPHATE SERPL-MCNC: 4.3 MG/DL (ref 2.5–4.5)
POTASSIUM SERPL-SCNC: 4 MMOL/L (ref 3.6–5.5)
POTASSIUM UR-SCNC: 56.8 MMOL/L
PROT SERPL-MCNC: 7.4 G/DL (ref 6–8.2)
PTH-INTACT SERPL-MCNC: 51.6 PG/ML (ref 14–72)
SODIUM SERPL-SCNC: 137 MMOL/L (ref 135–145)

## 2019-12-23 PROCEDURE — 82570 ASSAY OF URINE CREATININE: CPT

## 2019-12-23 PROCEDURE — 36415 COLL VENOUS BLD VENIPUNCTURE: CPT

## 2019-12-23 PROCEDURE — 82533 TOTAL CORTISOL: CPT

## 2019-12-23 PROCEDURE — 84100 ASSAY OF PHOSPHORUS: CPT

## 2019-12-23 PROCEDURE — 83970 ASSAY OF PARATHORMONE: CPT

## 2019-12-23 PROCEDURE — 82626 DEHYDROEPIANDROSTERONE: CPT

## 2019-12-23 PROCEDURE — 82652 VIT D 1 25-DIHYDROXY: CPT | Mod: GZ

## 2019-12-23 PROCEDURE — 82340 ASSAY OF CALCIUM IN URINE: CPT

## 2019-12-23 PROCEDURE — 84133 ASSAY OF URINE POTASSIUM: CPT

## 2019-12-23 PROCEDURE — 83520 IMMUNOASSAY QUANT NOS NONAB: CPT

## 2019-12-23 PROCEDURE — 84105 ASSAY OF URINE PHOSPHORUS: CPT

## 2019-12-23 PROCEDURE — 82088 ASSAY OF ALDOSTERONE: CPT

## 2019-12-23 PROCEDURE — 80053 COMPREHEN METABOLIC PANEL: CPT

## 2019-12-24 LAB — 1,25(OH)2D3 SERPL-MCNC: 74.6 PG/ML (ref 19.9–79.3)

## 2019-12-25 LAB
ALDOST SERPL-MCNC: 59.1 NG/DL
CALCIUM 24H UR-MCNC: 1.1 MG/DL
CALCIUM 24H UR-MRATE: NORMAL MG/D
CALCIUM/CREAT 24H UR: 20 MG/G (ref 20–300)
COLLECT DURATION TIME SPEC: NORMAL HRS
COLLECT DURATION TIME SPEC: NORMAL HRS
CREAT 24H UR-MCNC: 56 MG/DL
CREAT 24H UR-MRATE: NORMAL MG/D (ref 500–1400)
PHOSPHATE 24H UR-MCNC: 28 MG/DL
PHOSPHATE 24H UR-MRATE: NORMAL MG/D (ref 400–1300)
PHOSPHATE/CREAT 24H UR: 500 MG/G
SPECIMEN VOL ?TM UR: NORMAL ML
TOTAL VOLUME 1105: NORMAL ML

## 2019-12-27 LAB — DHEA SERPL-MCNC: 0.3 NG/ML (ref 0.63–4.7)

## 2019-12-31 ENCOUNTER — HOSPITAL ENCOUNTER (OUTPATIENT)
Dept: CARDIOLOGY | Facility: MEDICAL CENTER | Age: 54
End: 2019-12-31
Attending: INTERNAL MEDICINE
Payer: MEDICARE

## 2019-12-31 DIAGNOSIS — I42.0 DILATED CARDIOMYOPATHY (HCC): ICD-10-CM

## 2019-12-31 LAB
FGF-23 PLAS-ACNC: 281 RU/ML
LV EJECT FRACT  99904: 75
LV EJECT FRACT MOD 2C 99903: 75.98
LV EJECT FRACT MOD 4C 99902: 49.58
LV EJECT FRACT MOD BP 99901: 59.72

## 2019-12-31 PROCEDURE — 93306 TTE W/DOPPLER COMPLETE: CPT

## 2019-12-31 PROCEDURE — 93306 TTE W/DOPPLER COMPLETE: CPT | Mod: 26 | Performed by: INTERNAL MEDICINE

## 2020-01-02 ENCOUNTER — OFFICE VISIT (OUTPATIENT)
Dept: CARDIOLOGY | Facility: MEDICAL CENTER | Age: 55
End: 2020-01-02
Payer: MEDICARE

## 2020-01-02 VITALS
WEIGHT: 182 LBS | BODY MASS INDEX: 31.07 KG/M2 | HEIGHT: 64 IN | HEART RATE: 84 BPM | OXYGEN SATURATION: 96 % | SYSTOLIC BLOOD PRESSURE: 144 MMHG | DIASTOLIC BLOOD PRESSURE: 90 MMHG

## 2020-01-02 DIAGNOSIS — I42.0 DILATED CARDIOMYOPATHY (HCC): ICD-10-CM

## 2020-01-02 DIAGNOSIS — I71.21 ASCENDING AORTIC ANEURYSM (HCC): ICD-10-CM

## 2020-01-02 DIAGNOSIS — R09.89 LABILE HYPERTENSION: ICD-10-CM

## 2020-01-02 DIAGNOSIS — Z01.810 PRE-OPERATIVE CARDIOVASCULAR EXAMINATION: ICD-10-CM

## 2020-01-02 PROCEDURE — 99214 OFFICE O/P EST MOD 30 MIN: CPT | Performed by: INTERNAL MEDICINE

## 2020-01-02 RX ORDER — CARVEDILOL 12.5 MG/1
12.5 TABLET ORAL 2 TIMES DAILY WITH MEALS
Qty: 180 TAB | Refills: 2 | Status: SHIPPED | OUTPATIENT
Start: 2020-01-02 | End: 2020-01-21

## 2020-01-02 ASSESSMENT — ENCOUNTER SYMPTOMS
BLURRED VISION: 0
PALPITATIONS: 0
FEVER: 0
GASTROINTESTINAL NEGATIVE: 1
INSOMNIA: 0
DIZZINESS: 0
SHORTNESS OF BREATH: 0

## 2020-01-02 NOTE — PROGRESS NOTES
Chief Complaint   Patient presents with   • HTN (Uncontrolled)     follow up echo results   History stress induced cardiomyopathy diagnosed in [place]    Subjective:   Donna Isaac is a 54 y.o. female who presents today for f/u above issues    Her case is somewhat perplexing  History stress induced cardiomyopathy diagnosed in [place] in 2015  EF was NL in 2018 and 4/2019  Had acute pulmonary edema last November with EF down to 20% but denies any major preceding stress event    Admitted a few weeks ago for difficult to control HTN  Losartan was increased to 100 mg/d  Was prescribed hydralazine PRN, has to use it a few times but no daily  Repeat ECHO 2 days ago, EF has now normalized.    The patient is doing well from cardiac standpoint.   Shee denies any chest pain, palpitation or heart failure type symptoms.  BP still rather labile.  No longer having dizziness or syncope  Denies palpitations  Denies any side effect from medications.    Scheduled to have sinus surgery at [facility] 2/11  Also want to have shoulder surgery    Stress MPI 1/2019 negative    Past Medical History:   Diagnosis Date   • AAA (abdominal aortic aneurysm) ([facility])    • Asthma    • Chronic pain    • Congestive heart failure ([facility])     Cardiologist, Dr. Carlson   • Fibromyalgia    • GERD (gastroesophageal reflux disease)    • Hypertension    • Migraine    • Osteoporosis    • Renal disorder     CKD   • Urticaria      Past Surgical History:   Procedure Laterality Date   • SHOULDER DECOMPRESSION ARTHROSCOPIC Left 2/19/2019    Procedure: SHOULDER DECOMPRESSION ARTHROSCOPIC - SUBACROMIAL;  Surgeon: Camila Elkins M.D.;  Location: SURGERY [facility];  Service: Orthopedics   • CLAVICLE DISTAL EXCISION Left 2/19/2019    Procedure: CLAVICLE DISTAL EXCISION;  Surgeon: Camila Elkins M.D.;  Location: SURGERY [facility];  Service: Orthopedics   • SHOULDER ARTHROSCOPY W/ BICIPITAL TENODESIS REPAIR Left 2/19/2019     Procedure: SHOULDER ARTHROSCOPY W/ BICIPITAL TENODESIS REPAIR;  Surgeon: Camila Elkins M.D.;  Location: SURGERY [facility];  Service: Orthopedics   • GASTROSCOPY N/A 2/7/2019    Procedure: GASTROSCOPY- DIALATION AND BIOPSY;  Surgeon: Hola Veliz M.D.;  Location: SURGERY [facility];  Service: Gastroenterology   • ABDOMINAL EXPLORATION  2002   • GASTRIC BYPASS LAPAROSCOPIC  1999   • HYSTERECTOMY, TOTAL ABDOMINAL  1995   • APPENDECTOMY  1974     Family History   Problem Relation Age of Onset   • Heart Disease Mother    • Diabetes Mother    • Heart Failure Mother    • Hypertension Mother    • Hypertension Father    • Heart Disease Brother    • Heart Attack Brother    • Hypertension Brother      Social History     Socioeconomic History   • Marital status:      Spouse name: Not on file   • Number of children: Not on file   • Years of education: Not on file   • Highest education level: Not on file   Occupational History   • Not on file   Social Needs   • Financial resource strain: Not on file   • Food insecurity:     Worry: Never true     Inability: Never true   • Transportation needs:     Medical: No     Non-medical: No   Tobacco Use   • Smoking status: Never Smoker   • Smokeless tobacco: Never Used   Substance and Sexual Activity   • Alcohol use: Not Currently   • Drug use: No   • Sexual activity: Not on file   Lifestyle   • Physical activity:     Days per week: Not on file     Minutes per session: Not on file   • Stress: Not on file   Relationships   • Social connections:     Talks on phone: Not on file     Gets together: Not on file     Attends [employer] service: Not on file     Active member of club or organization: Not on file     Attends meetings of clubs or organizations: Not on file     Relationship status: Not on file   • Intimate partner violence:     Fear of current or ex partner: Not on file     Emotionally abused: Not on file     Physically abused: Not on file     Forced sexual  "activity: Not on file   Other Topics Concern   • Not on file   Social History Narrative   • Not on file     Allergies   Allergen Reactions   • Bactrim [Sulfamethoxazole W-Trimethoprim] Shortness of Breath   • Bee Venom Anaphylaxis   • Econazole Anaphylaxis   • Floxin [Ofloxacin] Anaphylaxis, Shortness of Breath and Swelling     Cause throat swelling and difficulty breathing   • Gadolinium Derivatives Hives and Swelling     Throat swelling   • Iodine Shortness of Breath and Anaphylaxis     Throat and tongue swelling with IV contrast   • Keflex Shortness of Breath   • Levaquin Shortness of Breath     anaphlyaxis   • Levofloxacin Shortness of Breath   • Morphine Anaphylaxis   • Naloxone Hives     \"hives, SOB\"   • Nitrofurantoin Shortness of Breath     ...and hives     • Norco [Apap-Fd&C Yellow #10 Al Tai-Hydrocodone] Shortness of Breath     ...and hives     • Oxycodone Shortness of Breath     ...and hives     • Penicillins Shortness of Breath     ...and hives     • Tape Contact Dermatitis     Blisters, clear tegaderm ok.. No steristrips.   • Ancef [Cefazolin] Hives   • Bextra [Valdecoxib] Rash     \"Skin burn and peeling.\"   • Flagyl [Kdc:Metronidazole+Tartrazine] Hives   • Linezolid Rash     Rash all over body   • Other Drug Hives     \"Enconazole nitrate.\" shortness of breath and hives   • Other Misc Unspecified     Adhesive tape: blisters, skin peels off   • Buprenorphine Anaphylaxis   • Clindamycin      HIVES     • Tygacil [Tigecycline]      itching   • Zithromax [Azithromycin] Hives and Shortness of Breath     Pt had Hives also     Outpatient Encounter Medications as of 1/2/2020   Medication Sig Dispense Refill   • losartan (COZAAR) 100 MG Tab Take 1 Tab by mouth every day. 30 Tab 2   • hydrALAZINE (APRESOLINE) 10 MG Tab Take 1 Tab by mouth 3 times a day as needed (systolic blood pressure over 180). 90 Tab 1   • spironolactone (ALDACTONE) 25 MG Tab Take 25 mg by mouth every evening.     • Non Formulary Request 0.03 " mg every day. Zomactin intramuscular injection     • carvedilol (COREG) 12.5 MG Tab Take 0.5 Tabs by mouth 2 times a day, with meals. 60 Tab 1   • DULoxetine (CYMBALTA) 60 MG Cap DR Particles delayed-release capsule Take 60 mg by mouth every evening.     • Frovatriptan Succinate (FROVA) 2.5 MG Tab Take 2.5 mg by mouth as needed (For migraines).     • hydrOXYzine HCl (ATARAX) 25 MG Tab Take 25 mg by mouth every bedtime. For sleep     • montelukast (SINGULAIR) 10 MG Tab Take 10 mg by mouth every evening.     • pantoprazole (PROTONIX) 40 MG Tablet Delayed Response Take 40 mg by mouth every evening.     • tizanidine (ZANAFLEX) 4 MG Tab Take 2-4 mg by mouth every 6 hours as needed. Indications: Muscle Spasticity     • FARXIGA 5 MG Tab Take 5 mg by mouth every evening.     • calcitonin, salmon, (MIACALCIN) 200 UNIT/ACT Solution Spray 1 Spray in nose every bedtime.  12   • meloxicam (MOBIC) 15 MG tablet Take 15 mg by mouth every evening.     • furosemide (LASIX) 20 MG Tab TAKE 0.5 TABS BY MOUTH 2 TIMES A DAY. 90 Tab 1   • Cyanocobalamin (B-12) 1000 MCG/ML Kit 1,000 mcg by Injection route every 7 days. On Tue     • colesevelam (WELCHOL) 625 MG Tab Take 625 mg by mouth 3 times a day, with meals.     • EPINEPHrine (EPIPEN) 0.3 MG/0.3ML Solution Auto-injector solution for injection 0.3 mg by Intramuscular route Once. Indications: Life-Threatening Hypersensitivity Reaction     • cevimeline (EVOXAC) 30 MG capsule Take 1 Cap by mouth 3 times a day. 270 Cap 0   • cetirizine (KLS ALLER-HAWA) 10 MG Tab Take 20 mg by mouth 2 times a day.     • liothyronine (CYTOMEL) 25 MCG Tab Take 25 mcg by mouth every day.     • AIMOVIG 70 MG/ML Solution Auto-injector 1 mL by Injection route Q30 DAYS.     • estradiol (ESTRACE) 0.5 MG tablet Take 0.5 mg by mouth every day.     • SYNTHROID 75 MCG Tab Take 75 mcg by mouth every evening.     • potassium chloride SA (KDUR) 20 MEQ Tab CR Take 1 Tab by mouth every day. 30 Tab 1   • gabapentin (NEURONTIN)  "400 MG Cap Take 1 Cap by mouth 3 times a day. 180 Cap 3   • Coral Calcium 1000 (390 Ca) MG Tab Take 1,000 mg by mouth every evening.     • magnesium oxide (MAG-OX) 400 MG Tab Take 400 mg by mouth every evening.     • Biotin 5000 MCG Cap Take 5,000 mcg by mouth every day.     • Cholecalciferol (VITAMIN D) 2000 units Cap Take 2,000 Units by mouth every evening.     • Menaquinone-7 (VITAMIN K2) 100 MCG Cap Take 1 Tab by mouth every day.     • multivitamin (THERAGRAN) Tab Take 1 Tab by mouth every day.       No facility-administered encounter medications on file as of 1/2/2020.      Review of Systems   Constitutional: Negative for fever.   HENT: Positive for congestion.    Eyes: Negative for blurred vision.   Respiratory: Negative for shortness of breath.    Cardiovascular: Negative for chest pain, palpitations and leg swelling.   Gastrointestinal: Negative.    Genitourinary: Negative.    Musculoskeletal: Positive for joint pain.        Right shoulder pain   Neurological: Negative for dizziness.   Endo/Heme/Allergies: Negative.    Psychiatric/Behavioral: The patient does not have insomnia.         Objective:   /90 (BP Location: Left arm, Patient Position: Sitting)   Pulse 84   Ht 1.626 m (5' 4\")   Wt 82.6 kg (182 lb)   LMP 02/07/2016 (Within Months)   SpO2 96%   BMI 31.24 kg/m²     Physical Exam   Constitutional: She is oriented to person, place, and time.   Neck: No JVD present.   Cardiovascular: Normal rate and regular rhythm. Exam reveals no gallop.   No murmur heard.  Pulmonary/Chest: Effort normal and breath sounds normal. No respiratory distress. She has no wheezes. She has no rales.   Abdominal: Soft. She exhibits no distension. There is no tenderness.   Musculoskeletal:         General: No edema.   Neurological: She is alert and oriented to person, place, and time.   Skin: Skin is warm and dry. No erythema.   Psychiatric: She has a normal mood and affect. Her behavior is normal.       Assessment: "     1. Dilated cardiomyopathy (HCC)     2. Ascending aortic aneurysm (HCC)     3. Labile hypertension     4. Pre-operative cardiovascular examination         Medical Decision Making:  Today's Assessment / Status / Plan:     Her BP is still rather labile.  I think it is prudent to postpone her surgery at least until her BP is under better control.  Will refer HTN clinic.  She is to continue her current cardiac medication for now.  We will keep you posted about our findings and further recommendations as they become available. Please also do not hesitate to call for any questions.  Thank you kindly for allowing me to participate in the care of this patient.

## 2020-01-06 ENCOUNTER — HOSPITAL ENCOUNTER (OUTPATIENT)
Dept: RADIOLOGY | Facility: MEDICAL CENTER | Age: 55
End: 2020-01-06
Attending: STUDENT IN AN ORGANIZED HEALTH CARE EDUCATION/TRAINING PROGRAM
Payer: MEDICARE

## 2020-01-06 DIAGNOSIS — Z12.31 VISIT FOR SCREENING MAMMOGRAM: ICD-10-CM

## 2020-01-06 PROCEDURE — 77067 SCR MAMMO BI INCL CAD: CPT

## 2020-01-07 ENCOUNTER — TELEPHONE (OUTPATIENT)
Dept: VASCULAR LAB | Facility: MEDICAL CENTER | Age: 55
End: 2020-01-07

## 2020-01-07 NOTE — TELEPHONE ENCOUNTER
1x- lft vm for pt to schedule Initial with Dr. Bloch when available   Michael J Bloch, M.D.  Reema Jin             Please schedule initial visit with Bloch when available

## 2020-01-14 ENCOUNTER — TELEPHONE (OUTPATIENT)
Dept: CARDIOLOGY | Facility: MEDICAL CENTER | Age: 55
End: 2020-01-14

## 2020-01-14 NOTE — TELEPHONE ENCOUNTER
CR     Pt is calling to request clearance for upcoming procedure(not yet scheduled) with [facility] P# [phone]. Please call pt for details at [phone].

## 2020-01-14 NOTE — TELEPHONE ENCOUNTER
Attempted to return patient's call.  Left voicemail requesting return phone call if she wishes to discuss matter further.

## 2020-01-15 NOTE — TELEPHONE ENCOUNTER
Spoke with patient who said she's been on the new dose of carvedilol for a week and her blood pressure has not been elevated.  Advised patient that Dr. Carlson is out of the office for the next few weeks.  Recommended that she continue keeping a blood pressure log and to call us again at the beginning of February with the readings or send them via [place].  Patient verbalized understanding and was appreciative for the return phone call.

## 2020-01-21 ENCOUNTER — APPOINTMENT (OUTPATIENT)
Dept: RADIOLOGY | Facility: MEDICAL CENTER | Age: 55
End: 2020-01-21
Attending: EMERGENCY MEDICINE
Payer: MEDICARE

## 2020-01-21 ENCOUNTER — HOSPITAL ENCOUNTER (OUTPATIENT)
Facility: MEDICAL CENTER | Age: 55
End: 2020-01-22
Attending: EMERGENCY MEDICINE | Admitting: INTERNAL MEDICINE
Payer: MEDICARE

## 2020-01-21 DIAGNOSIS — R09.02 HYPOXIA: ICD-10-CM

## 2020-01-21 DIAGNOSIS — J45.901 REACTIVE AIRWAY DISEASE WITH ACUTE EXACERBATION, UNSPECIFIED ASTHMA SEVERITY, UNSPECIFIED WHETHER PERSISTENT: ICD-10-CM

## 2020-01-21 DIAGNOSIS — J06.9 UPPER RESPIRATORY TRACT INFECTION, UNSPECIFIED TYPE: ICD-10-CM

## 2020-01-21 PROBLEM — J32.9 CHRONIC SINUSITIS: Status: ACTIVE | Noted: 2020-01-21

## 2020-01-21 LAB
ANION GAP SERPL CALC-SCNC: 15 MMOL/L (ref 0–11.9)
BASOPHILS # BLD AUTO: 0.4 % (ref 0–1.8)
BASOPHILS # BLD: 0.02 K/UL (ref 0–0.12)
BUN SERPL-MCNC: 18 MG/DL (ref 8–22)
CALCIUM SERPL-MCNC: 9.4 MG/DL (ref 8.4–10.2)
CHLORIDE SERPL-SCNC: 99 MMOL/L (ref 96–112)
CO2 SERPL-SCNC: 21 MMOL/L (ref 20–33)
CREAT SERPL-MCNC: 1.12 MG/DL (ref 0.5–1.4)
EKG IMPRESSION: NORMAL
EOSINOPHIL # BLD AUTO: 0.08 K/UL (ref 0–0.51)
EOSINOPHIL NFR BLD: 1.6 % (ref 0–6.9)
ERYTHROCYTE [DISTWIDTH] IN BLOOD BY AUTOMATED COUNT: 47.4 FL (ref 35.9–50)
FLUAV RNA SPEC QL NAA+PROBE: NEGATIVE
FLUBV RNA SPEC QL NAA+PROBE: NEGATIVE
GLUCOSE SERPL-MCNC: 162 MG/DL (ref 65–99)
HCT VFR BLD AUTO: 40 % (ref 37–47)
HGB BLD-MCNC: 12.5 G/DL (ref 12–16)
IMM GRANULOCYTES # BLD AUTO: 0.04 K/UL (ref 0–0.11)
IMM GRANULOCYTES NFR BLD AUTO: 0.8 % (ref 0–0.9)
LYMPHOCYTES # BLD AUTO: 0.52 K/UL (ref 1–4.8)
LYMPHOCYTES NFR BLD: 10.3 % (ref 22–41)
MCH RBC QN AUTO: 26.8 PG (ref 27–33)
MCHC RBC AUTO-ENTMCNC: 31.3 G/DL (ref 33.6–35)
MCV RBC AUTO: 85.8 FL (ref 81.4–97.8)
MONOCYTES # BLD AUTO: 0.38 K/UL (ref 0–0.85)
MONOCYTES NFR BLD AUTO: 7.5 % (ref 0–13.4)
NEUTROPHILS # BLD AUTO: 4.03 K/UL (ref 2–7.15)
NEUTROPHILS NFR BLD: 79.4 % (ref 44–72)
NRBC # BLD AUTO: 0 K/UL
NRBC BLD-RTO: 0 /100 WBC
NT-PROBNP SERPL IA-MCNC: 315 PG/ML (ref 0–125)
PLATELET # BLD AUTO: 317 K/UL (ref 164–446)
PMV BLD AUTO: 10.2 FL (ref 9–12.9)
POTASSIUM SERPL-SCNC: 4.5 MMOL/L (ref 3.6–5.5)
RBC # BLD AUTO: 4.66 M/UL (ref 4.2–5.4)
S PYO AG THROAT QL: NORMAL
SIGNIFICANT IND 70042: NORMAL
SITE SITE: NORMAL
SODIUM SERPL-SCNC: 135 MMOL/L (ref 135–145)
SOURCE SOURCE: NORMAL
WBC # BLD AUTO: 5.1 K/UL (ref 4.8–10.8)

## 2020-01-21 PROCEDURE — 99285 EMERGENCY DEPT VISIT HI MDM: CPT

## 2020-01-21 PROCEDURE — 71046 X-RAY EXAM CHEST 2 VIEWS: CPT

## 2020-01-21 PROCEDURE — 83880 ASSAY OF NATRIURETIC PEPTIDE: CPT

## 2020-01-21 PROCEDURE — 87081 CULTURE SCREEN ONLY: CPT

## 2020-01-21 PROCEDURE — 93005 ELECTROCARDIOGRAM TRACING: CPT | Performed by: EMERGENCY MEDICINE

## 2020-01-21 PROCEDURE — G0378 HOSPITAL OBSERVATION PER HR: HCPCS

## 2020-01-21 PROCEDURE — 700102 HCHG RX REV CODE 250 W/ 637 OVERRIDE(OP): Performed by: INTERNAL MEDICINE

## 2020-01-21 PROCEDURE — 94640 AIRWAY INHALATION TREATMENT: CPT

## 2020-01-21 PROCEDURE — 700111 HCHG RX REV CODE 636 W/ 250 OVERRIDE (IP): Performed by: INTERNAL MEDICINE

## 2020-01-21 PROCEDURE — 85025 COMPLETE CBC W/AUTO DIFF WBC: CPT

## 2020-01-21 PROCEDURE — 700101 HCHG RX REV CODE 250: Performed by: EMERGENCY MEDICINE

## 2020-01-21 PROCEDURE — 99220 PR INITIAL OBSERVATION CARE,LEVL III: CPT | Performed by: INTERNAL MEDICINE

## 2020-01-21 PROCEDURE — 87880 STREP A ASSAY W/OPTIC: CPT

## 2020-01-21 PROCEDURE — 87502 INFLUENZA DNA AMP PROBE: CPT

## 2020-01-21 PROCEDURE — 700101 HCHG RX REV CODE 250: Performed by: INTERNAL MEDICINE

## 2020-01-21 PROCEDURE — 80048 BASIC METABOLIC PNL TOTAL CA: CPT

## 2020-01-21 PROCEDURE — 36415 COLL VENOUS BLD VENIPUNCTURE: CPT

## 2020-01-21 PROCEDURE — 700111 HCHG RX REV CODE 636 W/ 250 OVERRIDE (IP): Performed by: EMERGENCY MEDICINE

## 2020-01-21 PROCEDURE — A9270 NON-COVERED ITEM OR SERVICE: HCPCS | Performed by: INTERNAL MEDICINE

## 2020-01-21 PROCEDURE — 93005 ELECTROCARDIOGRAM TRACING: CPT

## 2020-01-21 PROCEDURE — 94760 N-INVAS EAR/PLS OXIMETRY 1: CPT

## 2020-01-21 RX ORDER — ENALAPRILAT 1.25 MG/ML
1.25 INJECTION INTRAVENOUS EVERY 6 HOURS PRN
Status: DISCONTINUED | OUTPATIENT
Start: 2020-01-21 | End: 2020-01-22 | Stop reason: HOSPADM

## 2020-01-21 RX ORDER — PROMETHAZINE HYDROCHLORIDE 25 MG/1
12.5-25 TABLET ORAL EVERY 4 HOURS PRN
Status: DISCONTINUED | OUTPATIENT
Start: 2020-01-21 | End: 2020-01-22 | Stop reason: HOSPADM

## 2020-01-21 RX ORDER — PREDNISONE 10 MG/1
40 TABLET ORAL DAILY
Status: DISCONTINUED | OUTPATIENT
Start: 2020-01-21 | End: 2020-01-22 | Stop reason: HOSPADM

## 2020-01-21 RX ORDER — ALBUTEROL SULFATE 90 UG/1
2 AEROSOL, METERED RESPIRATORY (INHALATION) EVERY 6 HOURS PRN
Qty: 8.5 G | Refills: 1 | Status: SHIPPED | OUTPATIENT
Start: 2020-01-21 | End: 2020-01-22 | Stop reason: SDUPTHER

## 2020-01-21 RX ORDER — CARVEDILOL 25 MG/1
25 TABLET ORAL 2 TIMES DAILY WITH MEALS
COMMUNITY
End: 2021-03-16

## 2020-01-21 RX ORDER — PROCHLORPERAZINE EDISYLATE 5 MG/ML
5-10 INJECTION INTRAMUSCULAR; INTRAVENOUS EVERY 4 HOURS PRN
Status: DISCONTINUED | OUTPATIENT
Start: 2020-01-21 | End: 2020-01-22 | Stop reason: HOSPADM

## 2020-01-21 RX ORDER — FUROSEMIDE 20 MG/1
10 TABLET ORAL 2 TIMES DAILY
Status: DISCONTINUED | OUTPATIENT
Start: 2020-01-21 | End: 2020-01-22 | Stop reason: HOSPADM

## 2020-01-21 RX ORDER — ECHINACEA PURPUREA EXTRACT 125 MG
2 TABLET ORAL
Status: DISCONTINUED | OUTPATIENT
Start: 2020-01-21 | End: 2020-01-22 | Stop reason: HOSPADM

## 2020-01-21 RX ORDER — PREDNISONE 20 MG/1
40 TABLET ORAL DAILY
Qty: 10 TAB | Refills: 0 | Status: SHIPPED | OUTPATIENT
Start: 2020-01-21 | End: 2020-01-22

## 2020-01-21 RX ORDER — ONDANSETRON 2 MG/ML
4 INJECTION INTRAMUSCULAR; INTRAVENOUS EVERY 4 HOURS PRN
Status: DISCONTINUED | OUTPATIENT
Start: 2020-01-21 | End: 2020-01-22 | Stop reason: HOSPADM

## 2020-01-21 RX ORDER — CARVEDILOL 6.25 MG/1
25 TABLET ORAL 2 TIMES DAILY WITH MEALS
Status: DISCONTINUED | OUTPATIENT
Start: 2020-01-21 | End: 2020-01-22 | Stop reason: HOSPADM

## 2020-01-21 RX ORDER — BISACODYL 10 MG
10 SUPPOSITORY, RECTAL RECTAL
Status: DISCONTINUED | OUTPATIENT
Start: 2020-01-21 | End: 2020-01-22 | Stop reason: HOSPADM

## 2020-01-21 RX ORDER — DULOXETIN HYDROCHLORIDE 30 MG/1
60 CAPSULE, DELAYED RELEASE ORAL EVERY EVENING
Status: DISCONTINUED | OUTPATIENT
Start: 2020-01-21 | End: 2020-01-22 | Stop reason: HOSPADM

## 2020-01-21 RX ORDER — ESTRADIOL 1 MG/1
0.5 TABLET ORAL DAILY
Status: DISCONTINUED | OUTPATIENT
Start: 2020-01-22 | End: 2020-01-22 | Stop reason: HOSPADM

## 2020-01-21 RX ORDER — ACETAMINOPHEN 325 MG/1
650 TABLET ORAL EVERY 4 HOURS PRN
Status: DISCONTINUED | OUTPATIENT
Start: 2020-01-21 | End: 2020-01-22 | Stop reason: HOSPADM

## 2020-01-21 RX ORDER — CETIRIZINE HYDROCHLORIDE 10 MG/1
10 TABLET ORAL DAILY
Status: DISCONTINUED | OUTPATIENT
Start: 2020-01-22 | End: 2020-01-22 | Stop reason: HOSPADM

## 2020-01-21 RX ORDER — DEXLANSOPRAZOLE 60 MG/1
60 CAPSULE, DELAYED RELEASE ORAL
COMMUNITY
End: 2020-09-28

## 2020-01-21 RX ORDER — POLYETHYLENE GLYCOL 3350 17 G/17G
1 POWDER, FOR SOLUTION ORAL
Status: DISCONTINUED | OUTPATIENT
Start: 2020-01-21 | End: 2020-01-22 | Stop reason: HOSPADM

## 2020-01-21 RX ORDER — LEVOTHYROXINE SODIUM 0.05 MG/1
75 TABLET ORAL
Status: DISCONTINUED | OUTPATIENT
Start: 2020-01-22 | End: 2020-01-22 | Stop reason: HOSPADM

## 2020-01-21 RX ORDER — LOSARTAN POTASSIUM 25 MG/1
100 TABLET ORAL DAILY
Status: DISCONTINUED | OUTPATIENT
Start: 2020-01-22 | End: 2020-01-22 | Stop reason: HOSPADM

## 2020-01-21 RX ORDER — AMOXICILLIN 250 MG
2 CAPSULE ORAL 2 TIMES DAILY
Status: DISCONTINUED | OUTPATIENT
Start: 2020-01-21 | End: 2020-01-22 | Stop reason: HOSPADM

## 2020-01-21 RX ORDER — FAMOTIDINE 20 MG/1
40 TABLET, FILM COATED ORAL
Status: DISCONTINUED | OUTPATIENT
Start: 2020-01-21 | End: 2020-01-22 | Stop reason: HOSPADM

## 2020-01-21 RX ORDER — TIZANIDINE 4 MG/1
2 TABLET ORAL EVERY 6 HOURS PRN
Status: DISCONTINUED | OUTPATIENT
Start: 2020-01-21 | End: 2020-01-22 | Stop reason: HOSPADM

## 2020-01-21 RX ORDER — PANTOPRAZOLE SODIUM 40 MG/1
40 TABLET, DELAYED RELEASE ORAL EVERY EVENING
Status: DISCONTINUED | OUTPATIENT
Start: 2020-01-21 | End: 2020-01-21

## 2020-01-21 RX ORDER — ACETAMINOPHEN 325 MG/1
650 TABLET ORAL EVERY 4 HOURS PRN
Status: ON HOLD | COMMUNITY
End: 2020-03-17

## 2020-01-21 RX ORDER — OMEPRAZOLE 20 MG/1
20 CAPSULE, DELAYED RELEASE ORAL EVERY EVENING
Status: DISCONTINUED | OUTPATIENT
Start: 2020-01-21 | End: 2020-01-22 | Stop reason: HOSPADM

## 2020-01-21 RX ORDER — SPIRONOLACTONE 25 MG/1
25 TABLET ORAL DAILY
Status: DISCONTINUED | OUTPATIENT
Start: 2020-01-22 | End: 2020-01-22 | Stop reason: HOSPADM

## 2020-01-21 RX ORDER — GABAPENTIN 400 MG/1
400 CAPSULE ORAL 3 TIMES DAILY
Status: DISCONTINUED | OUTPATIENT
Start: 2020-01-21 | End: 2020-01-22 | Stop reason: HOSPADM

## 2020-01-21 RX ORDER — LIOTHYRONINE SODIUM 5 UG/1
25 TABLET ORAL DAILY
Status: DISCONTINUED | OUTPATIENT
Start: 2020-01-22 | End: 2020-01-22 | Stop reason: HOSPADM

## 2020-01-21 RX ORDER — PROMETHAZINE HYDROCHLORIDE 25 MG/1
12.5-25 SUPPOSITORY RECTAL EVERY 4 HOURS PRN
Status: DISCONTINUED | OUTPATIENT
Start: 2020-01-21 | End: 2020-01-22 | Stop reason: HOSPADM

## 2020-01-21 RX ORDER — COLESEVELAM 180 1/1
625 TABLET ORAL
Status: DISCONTINUED | OUTPATIENT
Start: 2020-01-21 | End: 2020-01-22 | Stop reason: HOSPADM

## 2020-01-21 RX ORDER — ONDANSETRON 4 MG/1
4 TABLET, ORALLY DISINTEGRATING ORAL EVERY 4 HOURS PRN
Status: DISCONTINUED | OUTPATIENT
Start: 2020-01-21 | End: 2020-01-22 | Stop reason: HOSPADM

## 2020-01-21 RX ORDER — FAMOTIDINE 20 MG/1
40 TABLET, FILM COATED ORAL
COMMUNITY
End: 2021-03-16

## 2020-01-21 RX ORDER — MONTELUKAST SODIUM 10 MG/1
10 TABLET ORAL EVERY EVENING
Status: DISCONTINUED | OUTPATIENT
Start: 2020-01-21 | End: 2020-01-22 | Stop reason: HOSPADM

## 2020-01-21 RX ORDER — GUAIFENESIN/DEXTROMETHORPHAN 100-10MG/5
10 SYRUP ORAL EVERY 6 HOURS PRN
Status: DISCONTINUED | OUTPATIENT
Start: 2020-01-21 | End: 2020-01-22 | Stop reason: HOSPADM

## 2020-01-21 RX ORDER — MELOXICAM 7.5 MG/1
15 TABLET ORAL EVERY EVENING
Status: DISCONTINUED | OUTPATIENT
Start: 2020-01-21 | End: 2020-01-22 | Stop reason: HOSPADM

## 2020-01-21 RX ADMIN — PREDNISONE 60 MG: 10 TABLET ORAL at 13:50

## 2020-01-21 RX ADMIN — ALBUTEROL SULFATE 2.5 MG: 2.5 SOLUTION RESPIRATORY (INHALATION) at 21:53

## 2020-01-21 RX ADMIN — DULOXETINE HYDROCHLORIDE 60 MG: 30 CAPSULE, DELAYED RELEASE ORAL at 21:03

## 2020-01-21 RX ADMIN — GUAIFENESIN AND DEXTROMETHORPHAN 10 ML: 100; 10 SYRUP ORAL at 23:33

## 2020-01-21 RX ADMIN — ALBUTEROL SULFATE 2.5 MG: 2.5 SOLUTION RESPIRATORY (INHALATION) at 14:00

## 2020-01-21 RX ADMIN — FUROSEMIDE 10 MG: 20 TABLET ORAL at 21:02

## 2020-01-21 RX ADMIN — FAMOTIDINE 40 MG: 20 TABLET ORAL at 21:03

## 2020-01-21 RX ADMIN — GABAPENTIN 400 MG: 400 CAPSULE ORAL at 21:03

## 2020-01-21 RX ADMIN — OMEPRAZOLE 20 MG: 20 CAPSULE, DELAYED RELEASE ORAL at 21:03

## 2020-01-21 RX ADMIN — CARVEDILOL 25 MG: 6.25 TABLET, FILM COATED ORAL at 21:01

## 2020-01-21 RX ADMIN — MELOXICAM 15 MG: 7.5 TABLET ORAL at 21:02

## 2020-01-21 RX ADMIN — ALBUTEROL SULFATE 2.5 MG: 2.5 SOLUTION RESPIRATORY (INHALATION) at 15:21

## 2020-01-21 RX ADMIN — MONTELUKAST 10 MG: 10 TABLET, FILM COATED ORAL at 21:03

## 2020-01-21 RX ADMIN — PREDNISONE 40 MG: 10 TABLET ORAL at 21:02

## 2020-01-21 RX ADMIN — COLESEVELAM HYDROCHLORIDE 625 MG: 625 TABLET, FILM COATED ORAL at 21:03

## 2020-01-21 ASSESSMENT — COGNITIVE AND FUNCTIONAL STATUS - GENERAL
DAILY ACTIVITIY SCORE: 24
MOBILITY SCORE: 24
SUGGESTED CMS G CODE MODIFIER DAILY ACTIVITY: CH
SUGGESTED CMS G CODE MODIFIER MOBILITY: CH

## 2020-01-21 ASSESSMENT — ENCOUNTER SYMPTOMS
HEARTBURN: 1
NAUSEA: 0
WHEEZING: 0
BACK PAIN: 0
HEMOPTYSIS: 1
HEADACHES: 0
SINUS PAIN: 0
TINGLING: 0
COUGH: 1
SHORTNESS OF BREATH: 1
NERVOUS/ANXIOUS: 0
BLOOD IN STOOL: 0
DEPRESSION: 0
INSOMNIA: 0
DIZZINESS: 0
CONSTIPATION: 0
MYALGIAS: 0
FEVER: 0
VOMITING: 0
SPUTUM PRODUCTION: 1
SORE THROAT: 0
PALPITATIONS: 0
STRIDOR: 0
ORTHOPNEA: 0
SEIZURES: 0

## 2020-01-21 ASSESSMENT — LIFESTYLE VARIABLES
CONSUMPTION TOTAL: NEGATIVE
HAVE PEOPLE ANNOYED YOU BY CRITICIZING YOUR DRINKING: NO
TOTAL SCORE: 0
ALCOHOL_USE: NO
ON A TYPICAL DAY WHEN YOU DRINK ALCOHOL HOW MANY DRINKS DO YOU HAVE: 0
HAVE YOU EVER FELT YOU SHOULD CUT DOWN ON YOUR DRINKING: NO
TOTAL SCORE: 0
EVER_SMOKED: NEVER
AVERAGE NUMBER OF DAYS PER WEEK YOU HAVE A DRINK CONTAINING ALCOHOL: 0
EVER HAD A DRINK FIRST THING IN THE MORNING TO STEADY YOUR NERVES TO GET RID OF A HANGOVER: NO
HOW MANY TIMES IN THE PAST YEAR HAVE YOU HAD 5 OR MORE DRINKS IN A DAY: 0
TOTAL SCORE: 0
EVER_SMOKED: NEVER
EVER FELT BAD OR GUILTY ABOUT YOUR DRINKING: NO

## 2020-01-21 ASSESSMENT — COPD QUESTIONNAIRES
DURING THE PAST 4 WEEKS HOW MUCH DID YOU FEEL SHORT OF BREATH: MOST  OR ALL OF THE TIME
HAVE YOU SMOKED AT LEAST 100 CIGARETTES IN YOUR ENTIRE LIFE: NO/DON'T KNOW
COPD SCREENING SCORE: 4
DO YOU EVER COUGH UP ANY MUCUS OR PHLEGM?: NO/ONLY WITH OCCASIONAL COLDS OR INFECTIONS

## 2020-01-21 NOTE — ED TRIAGE NOTES
"Chief Complaint   Patient presents with   • Cold Symptoms     x weeks, with productive cough     Mask applied prior to triage.  /93   Pulse 83   Temp 36.2 °C (97.1 °F) (Temporal)   Resp 16   Ht 1.626 m (5' 4\")   Wt 83.3 kg (183 lb 10.3 oz)   SpO2 97%   Pt informed of wait times. Educated on triage process.  Asked to return to triage RN for any new or worsening of symptoms. Thanked for patience.        "

## 2020-01-22 ENCOUNTER — PATIENT OUTREACH (OUTPATIENT)
Dept: HEALTH INFORMATION MANAGEMENT | Facility: OTHER | Age: 55
End: 2020-01-22

## 2020-01-22 VITALS
HEART RATE: 77 BPM | RESPIRATION RATE: 17 BRPM | OXYGEN SATURATION: 94 % | DIASTOLIC BLOOD PRESSURE: 89 MMHG | SYSTOLIC BLOOD PRESSURE: 120 MMHG | TEMPERATURE: 97.8 F | HEIGHT: 64 IN | BODY MASS INDEX: 31.73 KG/M2 | WEIGHT: 185.85 LBS

## 2020-01-22 LAB
ANION GAP SERPL CALC-SCNC: 17 MMOL/L (ref 0–11.9)
BASOPHILS # BLD AUTO: 0.1 % (ref 0–1.8)
BASOPHILS # BLD: 0.01 K/UL (ref 0–0.12)
BUN SERPL-MCNC: 14 MG/DL (ref 8–22)
CALCIUM SERPL-MCNC: 9.7 MG/DL (ref 8.4–10.2)
CHLORIDE SERPL-SCNC: 99 MMOL/L (ref 96–112)
CO2 SERPL-SCNC: 21 MMOL/L (ref 20–33)
CREAT SERPL-MCNC: 1.01 MG/DL (ref 0.5–1.4)
EOSINOPHIL # BLD AUTO: 0 K/UL (ref 0–0.51)
EOSINOPHIL NFR BLD: 0 % (ref 0–6.9)
ERYTHROCYTE [DISTWIDTH] IN BLOOD BY AUTOMATED COUNT: 48.3 FL (ref 35.9–50)
GLUCOSE SERPL-MCNC: 169 MG/DL (ref 65–99)
HCT VFR BLD AUTO: 40.3 % (ref 37–47)
HGB BLD-MCNC: 12.6 G/DL (ref 12–16)
IMM GRANULOCYTES # BLD AUTO: 0.01 K/UL (ref 0–0.11)
IMM GRANULOCYTES NFR BLD AUTO: 0.1 % (ref 0–0.9)
LYMPHOCYTES # BLD AUTO: 0.43 K/UL (ref 1–4.8)
LYMPHOCYTES NFR BLD: 6.4 % (ref 22–41)
MCH RBC QN AUTO: 26.8 PG (ref 27–33)
MCHC RBC AUTO-ENTMCNC: 31.3 G/DL (ref 33.6–35)
MCV RBC AUTO: 85.7 FL (ref 81.4–97.8)
MONOCYTES # BLD AUTO: 0.28 K/UL (ref 0–0.85)
MONOCYTES NFR BLD AUTO: 4.1 % (ref 0–13.4)
NEUTROPHILS # BLD AUTO: 6.04 K/UL (ref 2–7.15)
NEUTROPHILS NFR BLD: 89.3 % (ref 44–72)
NRBC # BLD AUTO: 0 K/UL
NRBC BLD-RTO: 0 /100 WBC
PLATELET # BLD AUTO: 343 K/UL (ref 164–446)
PMV BLD AUTO: 10.7 FL (ref 9–12.9)
POTASSIUM SERPL-SCNC: 4.6 MMOL/L (ref 3.6–5.5)
RBC # BLD AUTO: 4.7 M/UL (ref 4.2–5.4)
SODIUM SERPL-SCNC: 137 MMOL/L (ref 135–145)
WBC # BLD AUTO: 6.8 K/UL (ref 4.8–10.8)

## 2020-01-22 PROCEDURE — G0378 HOSPITAL OBSERVATION PER HR: HCPCS

## 2020-01-22 PROCEDURE — A9270 NON-COVERED ITEM OR SERVICE: HCPCS | Performed by: INTERNAL MEDICINE

## 2020-01-22 PROCEDURE — 94760 N-INVAS EAR/PLS OXIMETRY 1: CPT

## 2020-01-22 PROCEDURE — 85025 COMPLETE CBC W/AUTO DIFF WBC: CPT

## 2020-01-22 PROCEDURE — 700101 HCHG RX REV CODE 250: Performed by: INTERNAL MEDICINE

## 2020-01-22 PROCEDURE — 99217 PR OBSERVATION CARE DISCHARGE: CPT | Performed by: INTERNAL MEDICINE

## 2020-01-22 PROCEDURE — 94640 AIRWAY INHALATION TREATMENT: CPT

## 2020-01-22 PROCEDURE — 80048 BASIC METABOLIC PNL TOTAL CA: CPT

## 2020-01-22 PROCEDURE — 700102 HCHG RX REV CODE 250 W/ 637 OVERRIDE(OP): Performed by: INTERNAL MEDICINE

## 2020-01-22 RX ORDER — PREDNISONE 20 MG/1
40 TABLET ORAL DAILY
Qty: 8 TAB | Refills: 0 | Status: SHIPPED | OUTPATIENT
Start: 2020-01-22 | End: 2020-01-26

## 2020-01-22 RX ORDER — ALBUTEROL SULFATE 90 UG/1
2 AEROSOL, METERED RESPIRATORY (INHALATION) EVERY 6 HOURS PRN
Qty: 8.5 G | Refills: 0 | Status: SHIPPED | OUTPATIENT
Start: 2020-01-22 | End: 2020-06-28

## 2020-01-22 RX ADMIN — GUAIFENESIN AND DEXTROMETHORPHAN 10 ML: 100; 10 SYRUP ORAL at 05:34

## 2020-01-22 RX ADMIN — ALBUTEROL SULFATE 2.5 MG: 2.5 SOLUTION RESPIRATORY (INHALATION) at 10:35

## 2020-01-22 RX ADMIN — FUROSEMIDE 10 MG: 20 TABLET ORAL at 05:33

## 2020-01-22 RX ADMIN — ALBUTEROL SULFATE 2.5 MG: 2.5 SOLUTION RESPIRATORY (INHALATION) at 07:08

## 2020-01-22 RX ADMIN — CARVEDILOL 25 MG: 6.25 TABLET, FILM COATED ORAL at 08:12

## 2020-01-22 RX ADMIN — CETIRIZINE HYDROCHLORIDE 10 MG: 10 TABLET, FILM COATED ORAL at 05:33

## 2020-01-22 RX ADMIN — COLESEVELAM HYDROCHLORIDE 625 MG: 625 TABLET, FILM COATED ORAL at 08:12

## 2020-01-22 RX ADMIN — SPIRONOLACTONE 25 MG: 25 TABLET ORAL at 05:33

## 2020-01-22 RX ADMIN — LIOTHYRONINE SODIUM 25 MCG: 5 TABLET ORAL at 05:34

## 2020-01-22 RX ADMIN — COLESEVELAM HYDROCHLORIDE 625 MG: 625 TABLET, FILM COATED ORAL at 11:41

## 2020-01-22 RX ADMIN — LEVOTHYROXINE SODIUM 75 MCG: 50 TABLET ORAL at 05:34

## 2020-01-22 RX ADMIN — LOSARTAN POTASSIUM 100 MG: 25 TABLET ORAL at 05:38

## 2020-01-22 RX ADMIN — ESTRADIOL 0.5 MG: 1 TABLET ORAL at 05:33

## 2020-01-22 RX ADMIN — GABAPENTIN 400 MG: 400 CAPSULE ORAL at 11:40

## 2020-01-22 RX ADMIN — GABAPENTIN 400 MG: 400 CAPSULE ORAL at 01:27

## 2020-01-22 NOTE — RESPIRATORY CARE
COPD EDUCATION by COPD CLINICAL EDUCATOR  1/22/2020  at  10:15 AM by Arianna Caldwell, RRT     Patient interviewed by COPD education team.  Patient unable to participate in full program.  Patient hx Asthma only uses rescue inhaler occasionally, provided spacer with instruction for use, care, and cleaning.  Patient has good understanding of her Asthma.

## 2020-01-22 NOTE — PROGRESS NOTES
Report received from lillie Cortes sleeping during report.    RT at bedside and awoke her for a treatment.  Morning assessment done after this and vs taken.  Lungs sounds are a little diminished in the bases.  She reports that she had a productive cough this morning with large thick that was green with brown-green sputum and since then she has been hoarse.    Morning medications given and pt wanted to finish her tea.  Walking sats completed and she does NOT need oxygen for home; lowest was 92% while walking and talking.  When talking about discharge, she states she will make a f/u appointment with her pcp as she was talking to them before she came in.

## 2020-01-22 NOTE — FLOWSHEET NOTE
01/22/20 0710   Events/Summary/Plan   Events/Summary/Plan SVN given   Interdisciplinary Plan of Care-Goals (Indications)   Bronchodilator Indications History / Diagnosis   Interdisciplinary Plan of Care-Outcomes    Bronchodilator Outcome Patient at Stable Baseline   Education   Education Yes - Pt. / Family has been Instructed in use of Respiratory Medications and Adverse Reactions   RT Assessment of Delivered Medications   Evaluation of Medication Delivery Daily Yes-- Pt /Family has been Instructed in use of Respiratory Medications and Adverse Reactions   SVN Group   #SVN Performed Yes   Given By: Mouthpiece   Respiratory WDL   Respiratory (WDL) X   Chest Exam   Respiration 17   Pulse 76   Breath Sounds   Pre/Post Intervention Pre Intervention Assessment   RUL Breath Sounds Clear   RML Breath Sounds Clear;Diminished   RLL Breath Sounds Diminished   TRISTAN Breath Sounds Clear   LLL Breath Sounds Diminished   Oximetry   #Pulse Oximetry (Single Determination) Yes   Oxygen   Pulse Oximetry 93 %   O2 (LPM) 0   O2 Daily Delivery Respiratory  Room Air with O2 Available

## 2020-01-22 NOTE — FLOWSHEET NOTE
01/22/20 1036   Events/Summary/Plan   Events/Summary/Plan SVN given   Interdisciplinary Plan of Care-Goals (Indications)   Bronchodilator Indications History / Diagnosis   Interdisciplinary Plan of Care-Outcomes    Bronchodilator Outcome Patient at Stable Baseline   Education   Education Yes - Pt. / Family has been Instructed in use of Respiratory Medications and Adverse Reactions   RT Assessment of Delivered Medications   Evaluation of Medication Delivery Daily Yes-- Pt /Family has been Instructed in use of Respiratory Medications and Adverse Reactions   SVN Group   #SVN Performed Yes   Given By: Mouthpiece   Respiratory WDL   Respiratory (WDL) X   Chest Exam   Respiration 17   Pulse 77   Breath Sounds   Pre/Post Intervention Post Intervention Assessment   RUL Breath Sounds Clear   RML Breath Sounds Clear   RLL Breath Sounds Clear;Diminished   TRISTAN Breath Sounds Clear   LLL Breath Sounds Clear;Diminished   Oximetry   #Pulse Oximetry (Single Determination) Yes   Oxygen   Pulse Oximetry 94 %   O2 (LPM) 0   O2 Daily Delivery Respiratory  Room Air with O2 Available

## 2020-01-22 NOTE — H&P
Hospital Medicine History & Physical Note    Date of Service  1/21/2020    Primary Care Physician  Madisyn Mccullough M.D.    Consultants  none    Code Status  full    Chief Complaint  Shortness of breath    History of Presenting Illness  54 y.o. female who presented 1/21/2020 with chronic sinusitis, chronic systolic heart failure and asthma who presents with worsening shortness of breath, mostly on exertion, for one week. She complains of thick nasal drainage down the back of her throat that is causing a cough. She denies any wheezing, fever, chills, or diarrhea. She has no chest pain or new headaches. She is scheduled for sinus surgery in [place] the end of March this year.    Review of Systems  Review of Systems   Constitutional: Negative for fever and malaise/fatigue.   HENT: Positive for congestion. Negative for nosebleeds, sinus pain and sore throat.    Respiratory: Positive for cough, hemoptysis, sputum production and shortness of breath. Negative for wheezing and stridor.         Small streaks of red blood in sputum after coughing   Cardiovascular: Negative for chest pain, palpitations, orthopnea and leg swelling.   Gastrointestinal: Positive for heartburn. Negative for blood in stool, constipation, nausea and vomiting.   Genitourinary: Negative for dysuria, frequency, hematuria and urgency.   Musculoskeletal: Negative for back pain and myalgias.   Skin: Negative for itching and rash.   Neurological: Negative for dizziness, tingling, seizures and headaches.   Psychiatric/Behavioral: Negative for depression. The patient is not nervous/anxious and does not have insomnia.    All other systems reviewed and are negative.      Past Medical History   has a past medical history of AAA (abdominal aortic aneurysm) (HCC), Asthma, Chronic pain, Congestive heart failure (HCC), Fibromyalgia, GERD (gastroesophageal reflux disease), Hypertension, Migraine, Osteoporosis, Renal disorder, and Urticaria.    Surgical History    "has a past surgical history that includes appendectomy (1974); gastroscopy (N/A, 2/7/2019); hysterectomy, total abdominal (1995); gastric bypass laparoscopic (1999); abdominal exploration (2002); shoulder decompression arthroscopic (Left, 2/19/2019); clavicle distal excision (Left, 2/19/2019); and shoulder arthroscopy w/ bicipital tenodesis repair (Left, 2/19/2019).     Family History  family history includes Diabetes in her mother; Heart Attack in her brother; Heart Disease in her brother and mother; Heart Failure in her mother; Hypertension in her brother, father, and mother.     Social History   reports that she has never smoked. She has never used smokeless tobacco. She reports previous alcohol use. She reports that she does not use drugs.    Allergies  Allergies   Allergen Reactions   • Bactrim [Sulfamethoxazole W-Trimethoprim] Shortness of Breath   • Bee Venom Anaphylaxis   • Buprenorphine Anaphylaxis   • Doxycycline Hives and Shortness of Breath   • Econazole Anaphylaxis   • Floxin [Ofloxacin] Anaphylaxis, Shortness of Breath and Swelling     Cause throat swelling and difficulty breathing   • Gadolinium Derivatives Hives and Swelling     Throat swelling   • Iodine Shortness of Breath and Anaphylaxis     Throat and tongue swelling with IV contrast   • Keflex Shortness of Breath   • Levaquin Shortness of Breath     anaphlyaxis   • Levofloxacin Shortness of Breath   • Morphine Anaphylaxis   • Naloxone Hives     \"hives, SOB\"   • Nitrofurantoin Shortness of Breath     ...and hives     • Norco [Apap-Fd&C Yellow #10 Al Tai-Hydrocodone] Shortness of Breath     ...and hives     • Oxycodone Shortness of Breath     ...and hives     • Penicillins Shortness of Breath     ...and hives     • Tape Contact Dermatitis     Blisters, clear tegaderm ok.. No steristrips.   • Ancef [Cefazolin] Hives   • Bextra [Valdecoxib] Rash     \"Skin burn and peeling.\"   • Flagyl [Kdc:Metronidazole+Tartrazine] Hives   • Linezolid Rash     Rash " "all over body   • Other Drug Hives     \"Enconazole nitrate.\" shortness of breath and hives   • Other Misc Unspecified     Adhesive tape: blisters, skin peels off   • Clindamycin      HIVES     • Tygacil [Tigecycline]      itching   • Zithromax [Azithromycin] Hives and Shortness of Breath     Pt had Hives also       Medications  Prior to Admission Medications   Prescriptions Last Dose Informant Patient Reported? Taking?   AIMOVIG 70 MG/ML Solution Auto-injector 2020 at Unknown Patient Yes No   Si mL by Injection route Q30 DAYS.   Biotin 5000 MCG Cap 2020 at 1200 Patient Yes No   Sig: Take 5,000 mcg by mouth every day.   CALCIUM-MAGNESIUM-ZINC PO 2020 at 1200 Patient Yes Yes   Sig: Take 1 Tab by mouth every day.   Cholecalciferol (VITAMIN D) 2000 units Cap 2020 at 1200 Patient Yes No   Sig: Take 2,000 Units by mouth every day.   Cyanocobalamin (B-12) 1000 MCG/ML Kit 2020 at Unknown Patient Yes No   Si,000 mcg by Injection route every 7 days. On Tue   DULoxetine (CYMBALTA) 60 MG Cap DR Particles delayed-release capsule 2020 at 2100 Patient Yes No   Sig: Take 60 mg by mouth every evening.   Dexlansoprazole (DEXILANT) 60 MG CAPSULE DELAYED RELEASE delayed-release capsule 2020 at 0500 Patient Yes Yes   Sig: Take 60 mg by mouth every day.   EPINEPHrine (EPIPEN) 0.3 MG/0.3ML Solution Auto-injector solution for injection >3 months at Unknown Patient Yes No   Si.3 mg by Intramuscular route Once. Indications: Life-Threatening Hypersensitivity Reaction   Frovatriptan Succinate (FROVA) 2.5 MG Tab > 3 days at unknown Patient Yes No   Sig: Take 2.5 mg by mouth as needed (For migraines).   Menaquinone-7 (VITAMIN K2) 100 MCG Cap 2020 at 1200 Patient Yes No   Sig: Take 1 Tab by mouth every day.   Probiotic Product (PROBIOTIC PO) 2020 at 1200 Patient Yes Yes   Sig: Take 1 Cap by mouth every day.   SYNTHROID 75 MCG Tab 2020 at 0500 Patient Yes No   Sig: Take 75 mcg by mouth " Every morning on an empty stomach.   Somatropin 10 MG Recon Soln 1/20/2020 at 2100 Patient Yes No   Sig: Inject 10 mg as instructed every bedtime.   acetaminophen (TYLENOL) 325 MG Tab 1/20/2020 at 1900 Patient Yes Yes   Sig: Take 650 mg by mouth every four hours as needed for Moderate Pain.   calcitonin, salmon, (MIACALCIN) 200 UNIT/ACT Solution 1/20/2020 at 2100 Patient Yes No   Sig: Spray 1 Spray in nose every bedtime.   carvedilol (COREG) 25 MG Tab 1/21/2020 at 0500 Patient Yes Yes   Sig: Take 25 mg by mouth 2 times a day, with meals.   cetirizine (KLS ALLER-HAWA) 10 MG Tab 1/21/2020 at 0500 Patient Yes No   Sig: Take 20 mg by mouth 2 times a day.   cevimeline (EVOXAC) 30 MG capsule 1/21/2020 at 0900 Patient No No   Sig: Take 1 Cap by mouth 3 times a day.   colesevelam (WELCHOL) 625 MG Tab 1/21/2020 at 0900 Patient Yes No   Sig: Take 625 mg by mouth 3 times a day, with meals.   estradiol (ESTRACE) 0.5 MG tablet 1/21/2020 at 0500 Patient Yes No   Sig: Take 0.5 mg by mouth every day.   famotidine (PEPCID) 40 MG Tab 1/20/2020 at 2100 Patient Yes Yes   Sig: Take 40 mg by mouth every bedtime.   furosemide (LASIX) 20 MG Tab 1/21/2020 at 0500 Patient No No   Sig: TAKE 0.5 TABS BY MOUTH 2 TIMES A DAY.   gabapentin (NEURONTIN) 400 MG Cap 1/21/2020 at 0900 Patient No No   Sig: Take 1 Cap by mouth 3 times a day.   liothyronine (CYTOMEL) 25 MCG Tab 1/21/2020 at 0500 Patient Yes No   Sig: Take 25 mcg by mouth every day.   losartan (COZAAR) 100 MG Tab 1/21/2020 at 0500 Patient No No   Sig: Take 1 Tab by mouth every day.   magnesium oxide (MAG-OX) 400 MG Tab 1/21/2020 at 1200 Patient Yes No   Sig: Take 400 mg by mouth every day.   meloxicam (MOBIC) 15 MG tablet 1/20/2020 at 2100 Patient Yes No   Sig: Take 15 mg by mouth every evening.   montelukast (SINGULAIR) 10 MG Tab 1/20/2020 at 2100 Patient Yes No   Sig: Take 10 mg by mouth every evening.   multivitamin (THERAGRAN) Tab 1/21/2020 at 1200 Patient Yes No   Sig: Take 1 Tab by  mouth every day.   pantoprazole (PROTONIX) 40 MG Tablet Delayed Response 1/20/2020 at 2100 Patient Yes No   Sig: Take 40 mg by mouth every evening.   potassium chloride SA (KDUR) 20 MEQ Tab CR 1/21/2020 at 0500 Patient No No   Sig: Take 1 Tab by mouth every day.   spironolactone (ALDACTONE) 25 MG Tab 1/21/2020 at 0500 Patient Yes No   Sig: Take 25 mg by mouth every day.   tizanidine (ZANAFLEX) 4 MG Tab 1/21/2020 at 0900 Patient Yes No   Sig: Take 2-4 mg by mouth every 6 hours as needed. Indications: Muscle Spasticity      Facility-Administered Medications: None       Physical Exam  Temp:  [36.2 °C (97.1 °F)-36.5 °C (97.7 °F)] 36.5 °C (97.7 °F)  Pulse:  [] 90  Resp:  [14-16] 14  BP: (113-137)/(78-93) 113/78  SpO2:  [87 %-97 %] 87 %    Physical Exam  Vitals signs and nursing note reviewed.   Constitutional:       Appearance: She is not ill-appearing or diaphoretic.   HENT:      Nose: Congestion present. No rhinorrhea.      Mouth/Throat:      Mouth: Mucous membranes are moist.      Pharynx: No oropharyngeal exudate or posterior oropharyngeal erythema.   Eyes:      Extraocular Movements: Extraocular movements intact.      Conjunctiva/sclera: Conjunctivae normal.      Pupils: Pupils are equal, round, and reactive to light.   Neck:      Musculoskeletal: Neck supple. No muscular tenderness.   Cardiovascular:      Heart sounds: Normal heart sounds. No murmur.   Pulmonary:      Effort: No respiratory distress.      Breath sounds: No stridor. Rhonchi and rales present. No wheezing.      Comments: Increased accessory muscle use with walking  Abdominal:      General: Bowel sounds are normal. There is no distension.      Palpations: Abdomen is soft.      Tenderness: There is no tenderness.   Musculoskeletal:         General: No swelling, tenderness or signs of injury.   Skin:     Capillary Refill: Capillary refill takes less than 2 seconds.      Coloration: Skin is not jaundiced or pale.      Findings: No bruising.    Neurological:      General: No focal deficit present.      Mental Status: She is alert and oriented to person, place, and time. Mental status is at baseline.      Coordination: Coordination normal.   Psychiatric:         Mood and Affect: Mood normal.         Thought Content: Thought content normal.         Laboratory:  Recent Labs     01/21/20  1617   WBC 5.1   RBC 4.66   HEMOGLOBIN 12.5   HEMATOCRIT 40.0   MCV 85.8   MCH 26.8*   MCHC 31.3*   RDW 47.4   PLATELETCT 317   MPV 10.2     Recent Labs     01/21/20  1617   SODIUM 135   POTASSIUM 4.5   CHLORIDE 99   CO2 21   GLUCOSE 162*   BUN 18   CREATININE 1.12   CALCIUM 9.4     Recent Labs     01/21/20  1617   GLUCOSE 162*         Recent Labs     01/21/20  1617   NTPROBNP 315*         No results for input(s): TROPONINT in the last 72 hours.    Urinalysis:    No results found     Imaging:  DX-CHEST-2 VIEWS   Final Result      No radiographic evidence of acute cardiopulmonary process.            Assessment/Plan:  I anticipate this patient is appropriate for observation status at this time.    Chronic systolic heart failure (HCC)- (present on admission)  Assessment & Plan  Ejection fraction improved from 20 to 75% on echocardiogram will continue spironolactone, lasix and coreg  Hold potassium due to K of 4.5 on labs    Chronic sinusitis  Assessment & Plan  Sinus surgery is scheduled with [facility] March 30  Will hold flonase as patient states this was stopped by ENT Dr. Butler due to dry nasal mucosa  Will order saline spray    CKD (chronic kidney disease), stage III (HCC)- (present on admission)  Assessment & Plan  Stable at the patient's baseline    Gastroesophageal reflux disease without esophagitis- (present on admission)  Assessment & Plan  Continue pepcid and protonix    Hypothyroidism- (present on admission)  Assessment & Plan  Continue cytomel and synthroid        VTE prophylaxis: ambulation

## 2020-01-22 NOTE — PROGRESS NOTES
Pt arrived to unit via gurney. Ambulated from gurney to bed, stand by assist. Tele monitor applied, vitals taken. Pt assessed. A&O x4. Admit profile and med rec complete. Discussed POC with pt. Welcome folder provided and discussed. Communication board filled out. Questions and concerns addressed, verbalized understanding. Fall precautions in place. Pt demonstrates ability to use call light appropriately. Pt left in lowest position. Bed locked and low.

## 2020-01-22 NOTE — FLOWSHEET NOTE
01/21/20 2153   Interdisciplinary Plan of Care-Goals (Indications)   Bronchodilator Indications History / Diagnosis   Interdisciplinary Plan of Care-Outcomes    Bronchodilator Outcome Diminished Wheezing and Volume of Air Movement Increased   Education   Education Yes - Pt. / Family has been Instructed in use of Respiratory Equipment;Yes - Pt. / Family has been Instructed in use of Respiratory Medications and Adverse Reactions   RT Assessment of Delivered Medications   Evaluation of Medication Delivery Daily Yes-- Pt /Family has been Instructed in use of Respiratory Medications and Adverse Reactions   SVN Group   #SVN Performed Yes   Given By: Mouthpiece   Respiratory WDL   Respiratory (WDL) X   Chest Exam   Work Of Breathing / Effort Mild;Moderate   Respiration 19   Pulse 92   Breath Sounds   Pre/Post Intervention Pre Intervention Assessment   RUL Breath Sounds Diminished   RML Breath Sounds Diminished   RLL Breath Sounds Diminished   TRISTAN Breath Sounds Diminished   LLL Breath Sounds Diminished   Oximetry   #Pulse Oximetry (Single Determination) Yes   Oxygen   Home O2 Use Prior To Admission? No   Pulse Oximetry 97 %   O2 (LPM) 2   O2 Daily Delivery Respiratory  Nasal Cannula

## 2020-01-22 NOTE — DISCHARGE INSTRUCTIONS
Return to the ER for difficulty breathing, chest pain, fever    Follow-up with your primary care provider for recheck tomorrow    Take prednisone daily starting tomorrow    Use albuterol for shortness of breath and cough    We have held off on giving you antibiotics today as there is no pneumonia on your chest x-ray and the risk given your multiple antibiotic allergies.      Discharge Instructions    Discharged to home by car with relative. Discharged via walking, hospital escort: Yes.  Special equipment needed: Not Applicable    Be sure to schedule a follow-up appointment with your primary care doctor or any specialists as instructed.     Discharge Plan:   Influenza Vaccine Indication: Not indicated: Previously immunized this influenza season and > 8 years of age    I understand that a diet low in cholesterol, fat, and sodium is recommended for good health. Unless I have been given specific instructions below for another diet, I accept this instruction as my diet prescription.   Other diet: keep yourself hydrated.    Special Instructions:     Asthma, Adult  Asthma is a condition of the lungs in which the airways tighten and narrow. Asthma can make it hard to breathe. Asthma cannot be cured, but medicine and lifestyle changes can help control it. Asthma may be started (triggered) by:  · Animal skin flakes (dander).  · Dust.  · Cockroaches.  · Pollen.  · Mold.  · Smoke.  · Cleaning products.  · Hair sprays or aerosol sprays.  · Paint fumes or strong smells.  · Cold air, weather changes, and winds.  · Crying or laughing hard.  · Stress.  · Certain medicines or drugs.  · Foods, such as dried fruit, potato chips, and sparkling grape juice.  · Infections or conditions (colds, flu).  · Exercise.  · Certain medical conditions or diseases.  · Exercise or tiring activities.  Follow these instructions at home:  · Take medicine as told by your doctor.  · Use a peak flow meter as told by your doctor. A peak flow meter is a tool  that measures how well the lungs are working.  · Record and keep track of the peak flow meter's readings.  · Understand and use the asthma action plan. An asthma action plan is a written plan for taking care of your asthma and treating your attacks.  · To help prevent asthma attacks:  ¨ Do not smoke. Stay away from secondhand smoke.  ¨ Change your heating and air conditioning filter often.  ¨ Limit your use of fireplaces and wood stoves.  ¨ Get rid of pests (such as roaches and mice) and their droppings.  ¨ Throw away plants if you see mold on them.  ¨ Clean your floors. Dust regularly. Use cleaning products that do not smell.  ¨ Have someone vacuum when you are not home. Use a vacuum  with a HEPA filter if possible.  ¨ Replace carpet with wood, tile, or vinyl alysha. Carpet can trap animal skin flakes and dust.  ¨ Use allergy-proof pillows, mattress covers, and box spring covers.  ¨ Wash bed sheets and blankets every week in hot water and dry them in a dryer.  ¨ Use blankets that are made of polyester or cotton.  ¨ Clean bathrooms and mauricio with bleach. If possible, have someone repaint the walls in these rooms with mold-resistant paint. Keep out of the rooms that are being cleaned and painted.  ¨ Wash hands often.  Contact a doctor if:  · You have make a whistling sound when breaking (wheeze), have shortness of breath, or have a cough even if taking medicine to prevent attacks.  · The colored mucus you cough up (sputum) is thicker than usual.  · The colored mucus you cough up changes from clear or white to yellow, green, gray, or bloody.  · You have problems from the medicine you are taking such as:  ¨ A rash.  ¨ Itching.  ¨ Swelling.  ¨ Trouble breathing.  · You need reliever medicines more than 2-3 times a week.  · Your peak flow measurement is still at 50-79% of your personal best after following the action plan for 1 hour.  · You have a fever.  Get help right away if:  · You seem to be worse and  are not responding to medicine during an asthma attack.  · You are short of breath even at rest.  · You get short of breath when doing very little activity.  · You have trouble eating, drinking, or talking.  · You have chest pain.  · You have a fast heartbeat.  · Your lips or fingernails start to turn blue.  · You are light-headed, dizzy, or faint.  · Your peak flow is less than 50% of your personal best.  This information is not intended to replace advice given to you by your health care provider. Make sure you discuss any questions you have with your health care provider.  Document Released: 06/05/2009 Document Revised: 05/25/2017 Document Reviewed: 07/17/2014  [employer] Interactive Patient Education © 2017 [employer]      · Is patient discharged on Warfarin / Coumadin?   No     Depression / Suicide Risk    As you are discharged from this [facility] facility, it is important to learn how to keep safe from harming yourself.    Recognize the warning signs:  · Abrupt changes in personality, positive or negative- including increase in energy   · Giving away possessions  · Change in eating patterns- significant weight changes-  positive or negative  · Change in sleeping patterns- unable to sleep or sleeping all the time   · Unwillingness or inability to communicate  · Depression  · Unusual sadness, discouragement and loneliness  · Talk of wanting to die  · Neglect of personal appearance   · Rebelliousness- reckless behavior  · Withdrawal from people/activities they love  · Confusion- inability to concentrate     If you or a loved one observes any of these behaviors or has concerns about self-harm, here's what you can do:  · Talk about it- your feelings and reasons for harming yourself  · Remove any means that you might use to hurt yourself (examples: pills, rope, extension cords, firearm)  · Get professional help from the community (Mental Health, Substance Abuse, psychological counseling)  · Do not be alone:Call  your Safe Contact- someone whom you trust who will be there for you.  · Call your local CRISIS HOTLINE 806-4373 or 463-159-9714  · Call your local Children's Mobile Crisis Response Team [place] (335) 067-8330 or www.[employer]  · Call the toll free National Suicide Prevention Hotlines   · National Suicide Prevention Lifeline 027-292-OIHL (1344)  · National [employer] Line Network 800-SUICIDE (608-5542)

## 2020-01-22 NOTE — ASSESSMENT & PLAN NOTE
Sinus surgery is scheduled with [facility] March 30  Will hold flonase as patient states this was stopped by ENT Dr. Butler due to dry nasal mucosa  Will order saline spray

## 2020-01-22 NOTE — FLOWSHEET NOTE
01/21/20 2122   Type of Assessment   Assessment Yes   Patient History   Pulmonary Diagnosis asthma   Home O2 Use Prior To Admission? No   Home Treatments/Frequency No   COPD Risk Screening   Do you have a history of COPD? No   COPD Population Screener   During the past 4 weeks, how much did you feel short of breath? 2  (last two weeks)   Do you ever cough up any mucus or phlegm? 0   In the past 12 months, you do less than you used to because of your breathing problems 1  (sinus infection)   Have you smoked at least 100 cigarettes in your entire life? 0   How old are you? 1   COPD Screening Score 4   COPD Coordinator Recommended Yes   Smoking History   Have you ever smoked Never   Level Of Consciousness   Level of Consciousness Alert   Respiratory WDL   Respiratory (WDL) X   Chest Exam   Work Of Breathing / Effort Moderate;Mild   Respiration 16   Pulse 98   Breath Sounds   Pre/Post Intervention Pre Intervention Assessment   RUL Breath Sounds Clear;Diminished   RML Breath Sounds Diminished;Clear   RLL Breath Sounds Clear;Diminished   TRISTAN Breath Sounds Diminished;Clear   LLL Breath Sounds Clear;Diminished   Secretions   Cough Productive   How Sputum Obtained Spontaneous   Sputum Amount Moderate   Sputum Color Green   Sputum Consistency Thick   Protocol Pathways   Protocol Pathways Yes   Bronchodilator Protocol   Med Order With RCP Yes - Initial Order by MD for Therapy - Follow Order for 24 Hours, Reassess Patient   Is this an exacerbation of COPD/Asthma? No   Bronchodilator Indications History / Diagnosis   Breath Sounds Criteria 1    Benefit Criteria 1    Pulse Criteria 0    Respiratory Rate Criteria 1    S.O.B. Criteria 1    Criteria Total 4   Point Values 4-6 - QID and PRN   Bronchodilator Goals/Outcome Patient at Stable Baseline   O2 Protocol   O2 (LPM) 2   Pulse Oximetry 97 %

## 2020-01-22 NOTE — PROGRESS NOTES
Discharging Patient home per physician order.  Discharged with home to  at 1230.  Demonstrated understanding of discharge instructions, follow up appointments, home medications, prescriptions sent to [facility], and nursing care instructions for asthma.  Ambulating no assistance, voiding without difficulty, pain well controlled, tolerating oral medications, oxygen saturation greater than 90% , tolerating diet.   Educational handouts given and discussed.  Verbalized understanding of discharge instructions and educational handouts.  All questions answered.  Belongings with patient at time of discharge. Pt was sent home with paperwork in place.

## 2020-01-22 NOTE — ED NOTES
Med rec updated and complete  Allergies reviewed  Pt had a list of medications at bedside, went over list of medications and returned list back to pt at bedside.  Pt reports no antibiotics in the last 2 weeks

## 2020-01-22 NOTE — ASSESSMENT & PLAN NOTE
Will treat with respiratory therapy and steroids, taper oxygen as tolerated, patient will likely need a home oxygen evaluation prior to discharge

## 2020-01-22 NOTE — DISCHARGE SUMMARY
Discharge Summary    CHIEF COMPLAINT ON ADMISSION  Chief Complaint   Patient presents with   • Cold Symptoms     x weeks, with productive cough       Reason for Admission  Cough, Chest Pains, Shortness of B*     Admission Date  1/21/2020    CODE STATUS  Prior    HPI & HOSPITAL COURSE  This is a 54 y.o. female here with complaint of cough and shortness of breath.  Patient was noticed to have possible asthma exacerbation due to URI.  Patient was put on breathing treatment and steroid and tolerated treatment.  Patient's condition significant improved after being treated as above.       Therefore, she is discharged in fair and stable condition to home with close outpatient follow-up.    The patient recovered much more quickly than anticipated on admission.    Discharge Date  1/22/2020    FOLLOW UP ITEMS POST DISCHARGE  none    DISCHARGE DIAGNOSES  Active Problems:    Chronic systolic heart failure (HCC) POA: Yes    Gastroesophageal reflux disease without esophagitis POA: Yes    CKD (chronic kidney disease), stage III (HCC) POA: Yes    Chronic sinusitis POA: Yes    Hypothyroidism POA: Yes  Resolved Problems:    Acute respiratory failure with hypoxia (HCC) POA: Yes      FOLLOW UP  Future Appointments   Date Time Provider Department Center   2/18/2020 10:40 AM JASPER Del Toro [facility] None   3/10/2020  9:40 AM Michael J Bloch, M.D. [facility] None   4/21/2020 10:00 AM Adriana Carlson M.D. [facility] None     Madisyn Mccullough M.D.  [address] [id number] [phone]      Please call to schedule your hospital follow up. Thank you.       MEDICATIONS ON DISCHARGE     Medication List      START taking these medications      Instructions   albuterol 108 (90 Base) MCG/ACT Aers inhalation aerosol   Inhale 2 Puffs by mouth every 6 hours as needed for Shortness of Breath.  Dose:  2 Puff     predniSONE 20 MG Tabs  Commonly known as:  DELTASONE   Take 2 Tabs by mouth every day for 4 days.  Dose:  40 mg         CONTINUE taking these medications      Instructions   acetaminophen 325 MG Tabs  Commonly known as:  TYLENOL   Take 650 mg by mouth every four hours as needed for Moderate Pain.  Dose:  650 mg     AIMOVIG 70 MG/ML Soaj  Generic drug:  Erenumab   1 mL by Injection route Q30 DAYS.  Dose:  1 mL     B-12 1000 MCG/ML Kit   1,000 mcg by Injection route every 7 days. On Tue  Dose:  1,000 mcg     Biotin 5000 MCG Caps   Take 5,000 mcg by mouth every day.  Dose:  5,000 mcg     calcitonin (salmon) 200 UNIT/ACT Soln  Commonly known as:  MIACALCIN   Spray 1 Spray in nose every bedtime.  Dose:  1 Spray     CALCIUM-MAGNESIUM-ZINC PO   Take 1 Tab by mouth every day.  Dose:  1 Tab     carvedilol 25 MG Tabs  Commonly known as:  COREG   Take 25 mg by mouth 2 times a day, with meals.  Dose:  25 mg     cevimeline 30 MG capsule  Commonly known as:  EVOXAC   Take 1 Cap by mouth 3 times a day.  Dose:  30 mg     colesevelam 625 MG Tabs  Commonly known as:  WELCHOL   Take 625 mg by mouth 3 times a day, with meals.  Dose:  625 mg     DEXILANT 60 MG Cpdr delayed-release capsule  Generic drug:  Dexlansoprazole   Take 60 mg by mouth every day.  Dose:  60 mg     DULoxetine 60 MG Cpep delayed-release capsule  Commonly known as:  CYMBALTA   Take 60 mg by mouth every evening.  Dose:  60 mg     EPINEPHrine 0.3 MG/0.3ML Soaj solution for injection  Commonly known as:  EPIPEN   0.3 mg by Intramuscular route Once. Indications: Life-Threatening Hypersensitivity Reaction  Dose:  0.3 mg     estradiol 0.5 MG tablet  Commonly known as:  ESTRACE   Take 0.5 mg by mouth every day.  Dose:  0.5 mg     famotidine 40 MG Tabs  Commonly known as:  PEPCID   Take 40 mg by mouth every bedtime.  Dose:  40 mg     FROVA 2.5 MG Tabs  Generic drug:  Frovatriptan Succinate   Take 2.5 mg by mouth as needed (For migraines).  Dose:  2.5 mg     furosemide 20 MG Tabs  Commonly known as:  LASIX   TAKE 0.5 TABS BY MOUTH 2 TIMES A DAY.  Dose:  10 mg     gabapentin 400 MG  Caps  Commonly known as:  NEURONTIN   Take 1 Cap by mouth 3 times a day.  Dose:  400 mg     KLS ALLER-HAWA 10 MG Tabs  Generic drug:  cetirizine   Take 20 mg by mouth 2 times a day.  Dose:  20 mg     liothyronine 25 MCG Tabs  Commonly known as:  CYTOMEL   Take 25 mcg by mouth every day.  Dose:  25 mcg     losartan 100 MG Tabs  Commonly known as:  COZAAR   Take 1 Tab by mouth every day.  Dose:  100 mg     magnesium oxide 400 MG Tabs  Commonly known as:  MAG-OX   Take 400 mg by mouth every day.  Dose:  400 mg     MOBIC 15 MG tablet  Generic drug:  meloxicam   Take 15 mg by mouth every evening.  Dose:  15 mg     montelukast 10 MG Tabs  Commonly known as:  SINGULAIR   Take 10 mg by mouth every evening.  Dose:  10 mg     multivitamin Tabs   Take 1 Tab by mouth every day.  Dose:  1 Tab     pantoprazole 40 MG Tbec  Commonly known as:  PROTONIX   Take 40 mg by mouth every evening.  Dose:  40 mg     potassium chloride SA 20 MEQ Tbcr  Commonly known as:  Kdur   Take 1 Tab by mouth every day.  Dose:  20 mEq     PROBIOTIC PO   Take 1 Cap by mouth every day.  Dose:  1 Cap     Somatropin 10 MG Solr   Inject 10 mg as instructed every bedtime.  Dose:  10 mg     spironolactone 25 MG Tabs  Commonly known as:  ALDACTONE   Take 25 mg by mouth every day.  Dose:  25 mg     SYNTHROID 75 MCG Tabs  Generic drug:  levothyroxine   Take 75 mcg by mouth Every morning on an empty stomach.  Dose:  75 mcg     tizanidine 4 MG Tabs  Commonly known as:  ZANAFLEX   Take 2-4 mg by mouth every 6 hours as needed. Indications: Muscle Spasticity  Dose:  2-4 mg     Vitamin D 2000 units Caps   Take 2,000 Units by mouth every day.  Dose:  2,000 Units     Vitamin K2 100 MCG Caps   Take 1 Tab by mouth every day.  Dose:  1 Tab            Allergies  Allergies   Allergen Reactions   • Bactrim [Sulfamethoxazole W-Trimethoprim] Shortness of Breath   • Bee Venom Anaphylaxis   • Buprenorphine Anaphylaxis   • Doxycycline Hives and Shortness of Breath   • Econazole  "Anaphylaxis   • Floxin [Ofloxacin] Anaphylaxis, Shortness of Breath and Swelling     Cause throat swelling and difficulty breathing   • Gadolinium Derivatives Hives and Swelling     Throat swelling   • Iodine Shortness of Breath and Anaphylaxis     Throat and tongue swelling with IV contrast   • Keflex Shortness of Breath   • Levaquin Shortness of Breath     anaphlyaxis   • Levofloxacin Shortness of Breath   • Morphine Anaphylaxis   • Naloxone Hives     \"hives, SOB\"   • Nitrofurantoin Shortness of Breath     ...and hives     • Norco [Apap-Fd&C Yellow #10 Al Tai-Hydrocodone] Shortness of Breath     ...and hives     • Oxycodone Shortness of Breath     ...and hives     • Penicillins Shortness of Breath     ...and hives     • Tape Contact Dermatitis     Blisters, clear tegaderm ok.. No steristrips.   • Ancef [Cefazolin] Hives   • Bextra [Valdecoxib] Rash     \"Skin burn and peeling.\"   • Flagyl [Kdc:Metronidazole+Tartrazine] Hives   • Linezolid Rash     Rash all over body   • Other Drug Hives     \"Enconazole nitrate.\" shortness of breath and hives   • Other Misc Unspecified     Adhesive tape: blisters, skin peels off   • Clindamycin      HIVES     • Tygacil [Tigecycline]      itching   • Zithromax [Azithromycin] Hives and Shortness of Breath     Pt had Hives also       DIET  No orders of the defined types were placed in this encounter.      ACTIVITY  As tolerated.  Weight bearing as tolerated    CONSULTATIONS  none    PROCEDURES  None    DX-CHEST-2 VIEWS   Final Result      No radiographic evidence of acute cardiopulmonary process.          PE:  Gen: AAOx3, NAD  Eyes: PELLA  Neck: no JVD, no lymphadenopathy  Cardia: RRR, no mrg  Lungs: CTAB, no rales, rhonci or wheezing  Abd: NABS, soft, non extended, no mass  EXT: No C/C/E, peripheral pulse 2+ b/l  Neuro: CN II-XII intact, non focal, reflex 2+ symmetrical  Skin: Intact, no lesion, warm  Psych: Appropriate.      LABORATORY  Lab Results   Component Value Date    SODIUM " 137 01/22/2020    POTASSIUM 4.6 01/22/2020    CHLORIDE 99 01/22/2020    CO2 21 01/22/2020    GLUCOSE 169 (H) 01/22/2020    BUN 14 01/22/2020    CREATININE 1.01 01/22/2020        Lab Results   Component Value Date    WBC 6.8 01/22/2020    HEMOGLOBIN 12.6 01/22/2020    HEMATOCRIT 40.3 01/22/2020    PLATELETCT 343 01/22/2020        Total time of the discharge process exceeds 38 minutes.

## 2020-01-22 NOTE — PROGRESS NOTES
2 RN skin check complete.   Devices in place nasal cannula.  Skin assessed under devices intact.  Confirmed pressure ulcers found on n/a.  New potential pressure ulcers noted on n/a. Wound consult placed n/a.  The following interventions in place patient turns self side to side, patient ambulates occasionally, silicone tubing used for nasal cannula*

## 2020-01-23 LAB
S PYO SPEC QL CULT: NORMAL
SIGNIFICANT IND 70042: NORMAL
SITE SITE: NORMAL
SOURCE SOURCE: NORMAL

## 2020-01-24 ENCOUNTER — APPOINTMENT (OUTPATIENT)
Dept: RADIOLOGY | Facility: MEDICAL CENTER | Age: 55
End: 2020-01-24
Attending: EMERGENCY MEDICINE
Payer: MEDICARE

## 2020-01-24 ENCOUNTER — HOSPITAL ENCOUNTER (OUTPATIENT)
Dept: LAB | Facility: MEDICAL CENTER | Age: 55
End: 2020-01-24
Attending: INTERNAL MEDICINE
Payer: MEDICARE

## 2020-01-24 ENCOUNTER — HOSPITAL ENCOUNTER (OUTPATIENT)
Facility: MEDICAL CENTER | Age: 55
End: 2020-01-26
Attending: EMERGENCY MEDICINE | Admitting: INTERNAL MEDICINE
Payer: MEDICARE

## 2020-01-24 DIAGNOSIS — R07.9 CHEST PAIN, UNSPECIFIED TYPE: ICD-10-CM

## 2020-01-24 DIAGNOSIS — R06.02 SHORTNESS OF BREATH: ICD-10-CM

## 2020-01-24 DIAGNOSIS — D64.9 ANEMIA, UNSPECIFIED TYPE: ICD-10-CM

## 2020-01-24 LAB
ALBUMIN SERPL BCP-MCNC: 3.8 G/DL (ref 3.2–4.9)
ALBUMIN SERPL BCP-MCNC: 3.8 G/DL (ref 3.2–4.9)
ALBUMIN/GLOB SERPL: 1.2 G/DL
ALBUMIN/GLOB SERPL: 1.4 G/DL
ALP SERPL-CCNC: 85 U/L (ref 30–99)
ALP SERPL-CCNC: 87 U/L (ref 30–99)
ALT SERPL-CCNC: 23 U/L (ref 2–50)
ALT SERPL-CCNC: 27 U/L (ref 2–50)
ANION GAP SERPL CALC-SCNC: 11 MMOL/L (ref 0–11.9)
ANION GAP SERPL CALC-SCNC: 15 MMOL/L (ref 0–11.9)
AST SERPL-CCNC: 22 U/L (ref 12–45)
AST SERPL-CCNC: 35 U/L (ref 12–45)
BASOPHILS # BLD AUTO: 0.1 % (ref 0–1.8)
BASOPHILS # BLD: 0.01 K/UL (ref 0–0.12)
BILIRUB SERPL-MCNC: 0.2 MG/DL (ref 0.1–1.5)
BILIRUB SERPL-MCNC: 0.3 MG/DL (ref 0.1–1.5)
BUN SERPL-MCNC: 18 MG/DL (ref 8–22)
BUN SERPL-MCNC: 22 MG/DL (ref 8–22)
CALCIUM SERPL-MCNC: 8.7 MG/DL (ref 8.4–10.2)
CALCIUM SERPL-MCNC: 9.2 MG/DL (ref 8.5–10.5)
CHLORIDE SERPL-SCNC: 103 MMOL/L (ref 96–112)
CHLORIDE SERPL-SCNC: 98 MMOL/L (ref 96–112)
CO2 SERPL-SCNC: 18 MMOL/L (ref 20–33)
CO2 SERPL-SCNC: 24 MMOL/L (ref 20–33)
CORTIS SERPL-MCNC: 7.5 UG/DL (ref 0–23)
CREAT SERPL-MCNC: 1.03 MG/DL (ref 0.5–1.4)
CREAT SERPL-MCNC: 1.24 MG/DL (ref 0.5–1.4)
CREAT UR-MCNC: 110.9 MG/DL
EKG IMPRESSION: NORMAL
EOSINOPHIL # BLD AUTO: 0.01 K/UL (ref 0–0.51)
EOSINOPHIL NFR BLD: 0.1 % (ref 0–6.9)
ERYTHROCYTE [DISTWIDTH] IN BLOOD BY AUTOMATED COUNT: 46.8 FL (ref 35.9–50)
FASTING STATUS PATIENT QL REPORTED: NORMAL
GLOBULIN SER CALC-MCNC: 2.8 G/DL (ref 1.9–3.5)
GLOBULIN SER CALC-MCNC: 3.1 G/DL (ref 1.9–3.5)
GLUCOSE SERPL-MCNC: 110 MG/DL (ref 65–99)
GLUCOSE SERPL-MCNC: 92 MG/DL (ref 65–99)
HCT VFR BLD AUTO: 35.3 % (ref 37–47)
HGB BLD-MCNC: 11.1 G/DL (ref 12–16)
IMM GRANULOCYTES # BLD AUTO: 0.04 K/UL (ref 0–0.11)
IMM GRANULOCYTES NFR BLD AUTO: 0.5 % (ref 0–0.9)
LYMPHOCYTES # BLD AUTO: 0.7 K/UL (ref 1–4.8)
LYMPHOCYTES NFR BLD: 9.6 % (ref 22–41)
MCH RBC QN AUTO: 26.7 PG (ref 27–33)
MCHC RBC AUTO-ENTMCNC: 31.4 G/DL (ref 33.6–35)
MCV RBC AUTO: 84.9 FL (ref 81.4–97.8)
MONOCYTES # BLD AUTO: 0.57 K/UL (ref 0–0.85)
MONOCYTES NFR BLD AUTO: 7.8 % (ref 0–13.4)
NEUTROPHILS # BLD AUTO: 5.96 K/UL (ref 2–7.15)
NEUTROPHILS NFR BLD: 81.9 % (ref 44–72)
NRBC # BLD AUTO: 0 K/UL
NRBC BLD-RTO: 0 /100 WBC
NT-PROBNP SERPL IA-MCNC: 410 PG/ML (ref 0–125)
PHOSPHATE SERPL-MCNC: 2.9 MG/DL (ref 2.5–4.5)
PLATELET # BLD AUTO: 307 K/UL (ref 164–446)
PMV BLD AUTO: 10.2 FL (ref 9–12.9)
POTASSIUM SERPL-SCNC: 4 MMOL/L (ref 3.6–5.5)
POTASSIUM SERPL-SCNC: 4.3 MMOL/L (ref 3.6–5.5)
POTASSIUM UR-SCNC: 34.9 MMOL/L
PROT SERPL-MCNC: 6.6 G/DL (ref 6–8.2)
PROT SERPL-MCNC: 6.9 G/DL (ref 6–8.2)
RBC # BLD AUTO: 4.16 M/UL (ref 4.2–5.4)
SODIUM SERPL-SCNC: 131 MMOL/L (ref 135–145)
SODIUM SERPL-SCNC: 138 MMOL/L (ref 135–145)
SODIUM UR-SCNC: 51 MMOL/L
TROPONIN T SERPL-MCNC: <6 NG/L (ref 6–19)
TSH SERPL DL<=0.005 MIU/L-ACNC: 0.45 UIU/ML (ref 0.38–5.33)
WBC # BLD AUTO: 7.3 K/UL (ref 4.8–10.8)

## 2020-01-24 PROCEDURE — 700117 HCHG RX CONTRAST REV CODE 255: Performed by: EMERGENCY MEDICINE

## 2020-01-24 PROCEDURE — 82533 TOTAL CORTISOL: CPT

## 2020-01-24 PROCEDURE — 83520 IMMUNOASSAY QUANT NOS NONAB: CPT

## 2020-01-24 PROCEDURE — 36415 COLL VENOUS BLD VENIPUNCTURE: CPT

## 2020-01-24 PROCEDURE — G0378 HOSPITAL OBSERVATION PER HR: HCPCS

## 2020-01-24 PROCEDURE — 96375 TX/PRO/DX INJ NEW DRUG ADDON: CPT | Mod: XU

## 2020-01-24 PROCEDURE — 700102 HCHG RX REV CODE 250 W/ 637 OVERRIDE(OP): Performed by: EMERGENCY MEDICINE

## 2020-01-24 PROCEDURE — 82340 ASSAY OF CALCIUM IN URINE: CPT

## 2020-01-24 PROCEDURE — 80053 COMPREHEN METABOLIC PANEL: CPT

## 2020-01-24 PROCEDURE — 700101 HCHG RX REV CODE 250: Performed by: EMERGENCY MEDICINE

## 2020-01-24 PROCEDURE — 99220 PR INITIAL OBSERVATION CARE,LEVL III: CPT | Performed by: INTERNAL MEDICINE

## 2020-01-24 PROCEDURE — 71045 X-RAY EXAM CHEST 1 VIEW: CPT

## 2020-01-24 PROCEDURE — 700111 HCHG RX REV CODE 636 W/ 250 OVERRIDE (IP): Performed by: EMERGENCY MEDICINE

## 2020-01-24 PROCEDURE — 84100 ASSAY OF PHOSPHORUS: CPT

## 2020-01-24 PROCEDURE — 99285 EMERGENCY DEPT VISIT HI MDM: CPT

## 2020-01-24 PROCEDURE — 94760 N-INVAS EAR/PLS OXIMETRY 1: CPT

## 2020-01-24 PROCEDURE — 84484 ASSAY OF TROPONIN QUANT: CPT

## 2020-01-24 PROCEDURE — 85025 COMPLETE CBC W/AUTO DIFF WBC: CPT

## 2020-01-24 PROCEDURE — 83880 ASSAY OF NATRIURETIC PEPTIDE: CPT

## 2020-01-24 PROCEDURE — 84105 ASSAY OF URINE PHOSPHORUS: CPT

## 2020-01-24 PROCEDURE — 71275 CT ANGIOGRAPHY CHEST: CPT

## 2020-01-24 PROCEDURE — 93005 ELECTROCARDIOGRAM TRACING: CPT

## 2020-01-24 PROCEDURE — A9270 NON-COVERED ITEM OR SERVICE: HCPCS | Performed by: EMERGENCY MEDICINE

## 2020-01-24 PROCEDURE — 84133 ASSAY OF URINE POTASSIUM: CPT

## 2020-01-24 PROCEDURE — 93005 ELECTROCARDIOGRAM TRACING: CPT | Performed by: EMERGENCY MEDICINE

## 2020-01-24 PROCEDURE — 80053 COMPREHEN METABOLIC PANEL: CPT | Mod: 91

## 2020-01-24 PROCEDURE — 84300 ASSAY OF URINE SODIUM: CPT

## 2020-01-24 PROCEDURE — 82570 ASSAY OF URINE CREATININE: CPT

## 2020-01-24 PROCEDURE — 94640 AIRWAY INHALATION TREATMENT: CPT

## 2020-01-24 PROCEDURE — 84443 ASSAY THYROID STIM HORMONE: CPT

## 2020-01-24 PROCEDURE — 96374 THER/PROPH/DIAG INJ IV PUSH: CPT | Mod: XU

## 2020-01-24 RX ORDER — MONTELUKAST SODIUM 10 MG/1
10 TABLET ORAL EVERY EVENING
Status: DISCONTINUED | OUTPATIENT
Start: 2020-01-25 | End: 2020-01-26 | Stop reason: HOSPADM

## 2020-01-24 RX ORDER — PROMETHAZINE HYDROCHLORIDE 25 MG/1
12.5-25 TABLET ORAL EVERY 4 HOURS PRN
Status: DISCONTINUED | OUTPATIENT
Start: 2020-01-24 | End: 2020-01-26 | Stop reason: HOSPADM

## 2020-01-24 RX ORDER — PROCHLORPERAZINE EDISYLATE 5 MG/ML
5-10 INJECTION INTRAMUSCULAR; INTRAVENOUS EVERY 4 HOURS PRN
Status: DISCONTINUED | OUTPATIENT
Start: 2020-01-24 | End: 2020-01-26 | Stop reason: HOSPADM

## 2020-01-24 RX ORDER — CEVIMELINE HYDROCHLORIDE 30 MG/1
30 CAPSULE ORAL 3 TIMES DAILY
Status: DISCONTINUED | OUTPATIENT
Start: 2020-01-24 | End: 2020-01-25

## 2020-01-24 RX ORDER — TIZANIDINE 4 MG/1
2 TABLET ORAL EVERY 6 HOURS PRN
Status: DISCONTINUED | OUTPATIENT
Start: 2020-01-24 | End: 2020-01-26 | Stop reason: HOSPADM

## 2020-01-24 RX ORDER — ONDANSETRON 4 MG/1
4 TABLET, ORALLY DISINTEGRATING ORAL EVERY 4 HOURS PRN
Status: DISCONTINUED | OUTPATIENT
Start: 2020-01-24 | End: 2020-01-26 | Stop reason: HOSPADM

## 2020-01-24 RX ORDER — SPIRONOLACTONE 25 MG/1
25 TABLET ORAL DAILY
Status: DISCONTINUED | OUTPATIENT
Start: 2020-01-25 | End: 2020-01-26 | Stop reason: HOSPADM

## 2020-01-24 RX ORDER — CARVEDILOL 6.25 MG/1
25 TABLET ORAL 2 TIMES DAILY WITH MEALS
Status: DISCONTINUED | OUTPATIENT
Start: 2020-01-25 | End: 2020-01-26 | Stop reason: HOSPADM

## 2020-01-24 RX ORDER — ECHINACEA PURPUREA EXTRACT 125 MG
2 TABLET ORAL
Status: DISCONTINUED | OUTPATIENT
Start: 2020-01-24 | End: 2020-01-26 | Stop reason: HOSPADM

## 2020-01-24 RX ORDER — FAMOTIDINE 20 MG/1
40 TABLET, FILM COATED ORAL
Status: DISCONTINUED | OUTPATIENT
Start: 2020-01-24 | End: 2020-01-26 | Stop reason: HOSPADM

## 2020-01-24 RX ORDER — FUROSEMIDE 20 MG/1
10 TABLET ORAL
Status: DISCONTINUED | OUTPATIENT
Start: 2020-01-25 | End: 2020-01-25

## 2020-01-24 RX ORDER — DIPHENHYDRAMINE HYDROCHLORIDE 50 MG/ML
50 INJECTION INTRAMUSCULAR; INTRAVENOUS ONCE
Status: COMPLETED | OUTPATIENT
Start: 2020-01-24 | End: 2020-01-24

## 2020-01-24 RX ORDER — LIOTHYRONINE SODIUM 5 UG/1
25 TABLET ORAL DAILY
Status: DISCONTINUED | OUTPATIENT
Start: 2020-01-25 | End: 2020-01-26

## 2020-01-24 RX ORDER — PREDNISONE 10 MG/1
40 TABLET ORAL DAILY
Status: COMPLETED | OUTPATIENT
Start: 2020-01-25 | End: 2020-01-25

## 2020-01-24 RX ORDER — METHYLPREDNISOLONE SODIUM SUCCINATE 125 MG/2ML
125 INJECTION, POWDER, LYOPHILIZED, FOR SOLUTION INTRAMUSCULAR; INTRAVENOUS ONCE
Status: COMPLETED | OUTPATIENT
Start: 2020-01-24 | End: 2020-01-24

## 2020-01-24 RX ORDER — AMOXICILLIN 250 MG
2 CAPSULE ORAL 2 TIMES DAILY
Status: DISCONTINUED | OUTPATIENT
Start: 2020-01-24 | End: 2020-01-26 | Stop reason: HOSPADM

## 2020-01-24 RX ORDER — COLESEVELAM 180 1/1
625 TABLET ORAL
Status: DISCONTINUED | OUTPATIENT
Start: 2020-01-25 | End: 2020-01-26 | Stop reason: HOSPADM

## 2020-01-24 RX ORDER — BENZONATATE 100 MG/1
100 CAPSULE ORAL 3 TIMES DAILY PRN
Status: DISCONTINUED | OUTPATIENT
Start: 2020-01-24 | End: 2020-01-26 | Stop reason: HOSPADM

## 2020-01-24 RX ORDER — PROMETHAZINE HYDROCHLORIDE 25 MG/1
12.5-25 SUPPOSITORY RECTAL EVERY 4 HOURS PRN
Status: DISCONTINUED | OUTPATIENT
Start: 2020-01-24 | End: 2020-01-26 | Stop reason: HOSPADM

## 2020-01-24 RX ORDER — GUAIFENESIN/DEXTROMETHORPHAN 100-10MG/5
10 SYRUP ORAL EVERY 6 HOURS PRN
Status: DISCONTINUED | OUTPATIENT
Start: 2020-01-24 | End: 2020-01-26 | Stop reason: HOSPADM

## 2020-01-24 RX ORDER — ESTRADIOL 1 MG/1
0.5 TABLET ORAL DAILY
Status: DISCONTINUED | OUTPATIENT
Start: 2020-01-25 | End: 2020-01-26 | Stop reason: HOSPADM

## 2020-01-24 RX ORDER — ALBUTEROL SULFATE 90 UG/1
2 AEROSOL, METERED RESPIRATORY (INHALATION) EVERY 6 HOURS PRN
Status: DISCONTINUED | OUTPATIENT
Start: 2020-01-24 | End: 2020-01-26 | Stop reason: HOSPADM

## 2020-01-24 RX ORDER — BISACODYL 10 MG
10 SUPPOSITORY, RECTAL RECTAL
Status: DISCONTINUED | OUTPATIENT
Start: 2020-01-24 | End: 2020-01-26 | Stop reason: HOSPADM

## 2020-01-24 RX ORDER — GABAPENTIN 400 MG/1
400 CAPSULE ORAL 3 TIMES DAILY
Status: DISCONTINUED | OUTPATIENT
Start: 2020-01-25 | End: 2020-01-26 | Stop reason: HOSPADM

## 2020-01-24 RX ORDER — LEVOTHYROXINE SODIUM 0.05 MG/1
75 TABLET ORAL
Status: DISCONTINUED | OUTPATIENT
Start: 2020-01-25 | End: 2020-01-26 | Stop reason: HOSPADM

## 2020-01-24 RX ORDER — ENALAPRILAT 1.25 MG/ML
1.25 INJECTION INTRAVENOUS EVERY 6 HOURS PRN
Status: DISCONTINUED | OUTPATIENT
Start: 2020-01-24 | End: 2020-01-26 | Stop reason: HOSPADM

## 2020-01-24 RX ORDER — ONDANSETRON 2 MG/ML
4 INJECTION INTRAMUSCULAR; INTRAVENOUS EVERY 4 HOURS PRN
Status: DISCONTINUED | OUTPATIENT
Start: 2020-01-24 | End: 2020-01-26 | Stop reason: HOSPADM

## 2020-01-24 RX ORDER — DAPAGLIFLOZIN 5 MG/1
1 TABLET, FILM COATED ORAL DAILY
COMMUNITY
Start: 2020-01-23 | End: 2020-03-10

## 2020-01-24 RX ORDER — LOSARTAN POTASSIUM 25 MG/1
100 TABLET ORAL DAILY
Status: DISCONTINUED | OUTPATIENT
Start: 2020-01-25 | End: 2020-01-26 | Stop reason: HOSPADM

## 2020-01-24 RX ORDER — BENZONATATE 100 MG/1
100 CAPSULE ORAL ONCE
Status: COMPLETED | OUTPATIENT
Start: 2020-01-24 | End: 2020-01-24

## 2020-01-24 RX ORDER — POLYETHYLENE GLYCOL 3350 17 G/17G
1 POWDER, FOR SOLUTION ORAL
Status: DISCONTINUED | OUTPATIENT
Start: 2020-01-24 | End: 2020-01-26 | Stop reason: HOSPADM

## 2020-01-24 RX ORDER — ACETAMINOPHEN 325 MG/1
650 TABLET ORAL EVERY 6 HOURS PRN
Status: DISCONTINUED | OUTPATIENT
Start: 2020-01-24 | End: 2020-01-26 | Stop reason: HOSPADM

## 2020-01-24 RX ORDER — HYDRALAZINE HYDROCHLORIDE 20 MG/ML
10 INJECTION INTRAMUSCULAR; INTRAVENOUS ONCE
Status: COMPLETED | OUTPATIENT
Start: 2020-01-24 | End: 2020-01-24

## 2020-01-24 RX ORDER — DULOXETIN HYDROCHLORIDE 30 MG/1
60 CAPSULE, DELAYED RELEASE ORAL EVERY EVENING
Status: DISCONTINUED | OUTPATIENT
Start: 2020-01-25 | End: 2020-01-26 | Stop reason: HOSPADM

## 2020-01-24 RX ORDER — MELOXICAM 7.5 MG/1
15 TABLET ORAL EVERY EVENING
Status: DISCONTINUED | OUTPATIENT
Start: 2020-01-25 | End: 2020-01-26 | Stop reason: HOSPADM

## 2020-01-24 RX ADMIN — ALBUTEROL SULFATE 2.5 MG: 2.5 SOLUTION RESPIRATORY (INHALATION) at 14:03

## 2020-01-24 RX ADMIN — DIPHENHYDRAMINE HYDROCHLORIDE 50 MG: 50 INJECTION INTRAMUSCULAR; INTRAVENOUS at 15:13

## 2020-01-24 RX ADMIN — LIDOCAINE HYDROCHLORIDE 30 ML: 20 SOLUTION OROPHARYNGEAL at 14:51

## 2020-01-24 RX ADMIN — BENZONATATE 100 MG: 100 CAPSULE ORAL at 14:51

## 2020-01-24 RX ADMIN — IOHEXOL 70 ML: 350 INJECTION, SOLUTION INTRAVENOUS at 16:26

## 2020-01-24 RX ADMIN — HYDRALAZINE HYDROCHLORIDE 10 MG: 20 INJECTION INTRAMUSCULAR; INTRAVENOUS at 17:59

## 2020-01-24 RX ADMIN — METHYLPREDNISOLONE SODIUM SUCCINATE 125 MG: 125 INJECTION, POWDER, FOR SOLUTION INTRAMUSCULAR; INTRAVENOUS at 15:54

## 2020-01-24 ASSESSMENT — ENCOUNTER SYMPTOMS
ORTHOPNEA: 0
PALPITATIONS: 0
BRUISES/BLEEDS EASILY: 0
DIAPHORESIS: 0
SPUTUM PRODUCTION: 1
TREMORS: 0
ROS GI COMMENTS: ABDOMINAL BLOATING
WHEEZING: 0
SHORTNESS OF BREATH: 1
HEMOPTYSIS: 0
MYALGIAS: 0
HEARTBURN: 0
FEVER: 0
NERVOUS/ANXIOUS: 0
FOCAL WEAKNESS: 0
SORE THROAT: 0
STRIDOR: 0
DEPRESSION: 0
CHILLS: 0
COUGH: 1
ABDOMINAL PAIN: 0
NAUSEA: 0
SENSORY CHANGE: 0
SINUS PAIN: 0
DIZZINESS: 0
DIARRHEA: 0
WEAKNESS: 0
BACK PAIN: 0
INSOMNIA: 0

## 2020-01-24 ASSESSMENT — LIFESTYLE VARIABLES
HOW MANY TIMES IN THE PAST YEAR HAVE YOU HAD 5 OR MORE DRINKS IN A DAY: 0
AVERAGE NUMBER OF DAYS PER WEEK YOU HAVE A DRINK CONTAINING ALCOHOL: 0
HAVE YOU EVER FELT YOU SHOULD CUT DOWN ON YOUR DRINKING: NO
EVER FELT BAD OR GUILTY ABOUT YOUR DRINKING: NO
ALCOHOL_USE: NO
EVER HAD A DRINK FIRST THING IN THE MORNING TO STEADY YOUR NERVES TO GET RID OF A HANGOVER: NO
TOTAL SCORE: 0
HAVE PEOPLE ANNOYED YOU BY CRITICIZING YOUR DRINKING: NO
ON A TYPICAL DAY WHEN YOU DRINK ALCOHOL HOW MANY DRINKS DO YOU HAVE: 0
CONSUMPTION TOTAL: NEGATIVE
EVER_SMOKED: NEVER

## 2020-01-24 NOTE — FLOWSHEET NOTE
01/24/20 1403   Events/Summary/Plan   Events/Summary/Plan ER TX   Interdisciplinary Plan of Care-Goals (Indications)   Bronchodilator Indications History / Diagnosis   Interdisciplinary Plan of Care-Outcomes    Bronchodilator Outcome Patient at Stable Baseline   Education   Education Yes - Pt. / Family has been Instructed in use of Respiratory Equipment;Yes - Pt. / Family has been Instructed in use of Respiratory Medications and Adverse Reactions   RT Assessment of Delivered Medications   Evaluation of Medication Delivery Daily Yes-- Pt /Family has been Instructed in use of Respiratory Medications and Adverse Reactions   SVN Group   #SVN Performed Yes   Given By: Mouthpiece   Date SVN Last Changed 01/24/20   Respiratory WDL   Respiratory (WDL) X   Chest Exam   Respiration 16   Pulse 70   Breath Sounds   Pre/Post Intervention Pre Intervention Assessment   RUL Breath Sounds Clear   RML Breath Sounds Clear   RLL Breath Sounds Diminished   TRISTAN Breath Sounds Clear   LLL Breath Sounds Diminished   Oximetry   #Pulse Oximetry (Single Determination) Yes   Oxygen   Home O2 Use Prior To Admission? No   Pulse Oximetry 97 %   O2 Daily Delivery Respiratory  Room Air with O2 Available

## 2020-01-24 NOTE — ED PROVIDER NOTES
ED Provider Note    CHIEF COMPLAINT  Chief Complaint   Patient presents with   • Shortness of Breath     continues to have sob  was admitted here 1/21 and d/c'ed the 1/22   • Weight Gain     states she has gained 10 pds on her scaled (the last 3 days)   • Headache   • Chest Pain     feels diffenent than the soreness she has w/ coughing       HPI  Donna Isaac is a 54 y.o. female with a remote history of asthma, CHF, thyroid disorder, GERD, hypertension, and fibromyalgia who presents complaining of shortness of breath, weight gain, and chest pain.    Patient states she was hospitalized and felt improved at discharge.  She has been using albuterol and taking prednisone as prescribed.  She reports that she gained 10 pounds in the last several days.  She took her usual Lasix dose which seemed to relieve some of the leg swelling she was experiencing today.  Patient states she has had some chest tightness that is been constant with this upper respiratory process but that she developed sharp midsternal chest pain this morning.  It is intermittent and stabbing.  She takes oral contraceptives/low-dose estrogen and had a DVT that was associated with a pelvic fracture years ago.  Patient states she also feels some tightness in her throat and has developed a hoarse voice.    Patient denies calf pain, hemoptysis, fever, chills, nausea, vomiting, diaphoresis.      ALLERGIES  Allergies   Allergen Reactions   • Bactrim [Sulfamethoxazole W-Trimethoprim] Shortness of Breath   • Bee Venom Anaphylaxis   • Buprenorphine Anaphylaxis   • Doxycycline Hives and Shortness of Breath   • Econazole Anaphylaxis   • Floxin [Ofloxacin] Anaphylaxis, Shortness of Breath and Swelling     Cause throat swelling and difficulty breathing   • Gadolinium Derivatives Hives and Swelling     Throat swelling   • Iodine Shortness of Breath and Anaphylaxis     Throat and tongue swelling with IV contrast   • Keflex Shortness of Breath   • Levaquin  "Shortness of Breath     anaphlyaxis   • Levofloxacin Shortness of Breath   • Morphine Anaphylaxis   • Naloxone Hives     \"hives, SOB\"   • Nitrofurantoin Shortness of Breath     ...and hives     • Norco [Apap-Fd&C Yellow #10 Al Tai-Hydrocodone] Shortness of Breath     ...and hives     • Oxycodone Shortness of Breath     ...and hives     • Penicillins Shortness of Breath     ...and hives     • Tape Contact Dermatitis     Blisters, clear tegaderm ok.. No steristrips.   • Ancef [Cefazolin] Hives   • Bextra [Valdecoxib] Rash     \"Skin burn and peeling.\"   • Flagyl [Kdc:Metronidazole+Tartrazine] Hives   • Linezolid Rash     Rash all over body   • Other Drug Hives     \"Enconazole nitrate.\" shortness of breath and hives   • Other Misc Unspecified     Adhesive tape: blisters, skin peels off   • Clindamycin      HIVES     • Tygacil [Tigecycline]      itching   • Zithromax [Azithromycin] Hives and Shortness of Breath     Pt had Hives also       CURRENT MEDICATIONS  Home Medications     Reviewed by Apolinar Bolanos (Pharmacy Tech) on 01/24/20 at 1541  Med List Status: Complete   Medication Last Dose Status   acetaminophen (TYLENOL) 325 MG Tab 1/20/2020 Active   AIMOVIG 70 MG/ML Solution Auto-injector 1/1/2020 Active   albuterol 108 (90 Base) MCG/ACT Aero Soln inhalation aerosol 1/24/2020 Active   Biotin 5000 MCG Cap 1/24/2020 Active   calcitonin, salmon, (MIACALCIN) 200 UNIT/ACT Solution 1/24/2020 Active   CALCIUM-MAGNESIUM-ZINC PO 1/23/2020 Active   carvedilol (COREG) 25 MG Tab 1/24/2020 Active   cetirizine (KLS ALLER-HAWA) 10 MG Tab 1/24/2020 Active   cevimeline (EVOXAC) 30 MG capsule 1/24/2020 Active   Cholecalciferol (VITAMIN D) 2000 units Cap 1/23/2020 Active   colesevelam (WELCHOL) 625 MG Tab 1/24/2020 Active   Cyanocobalamin (B-12) 1000 MCG/ML Kit 1/22/2020 Active   Dexlansoprazole (DEXILANT) 60 MG CAPSULE DELAYED RELEASE delayed-release capsule 1/23/2020 Active   DULoxetine (CYMBALTA) 60 MG Cap DR Particles " delayed-release capsule 1/23/2020 Active   EPINEPHrine (EPIPEN) 0.3 MG/0.3ML Solution Auto-injector solution for injection PRN Active   estradiol (ESTRACE) 0.5 MG tablet 1/24/2020 Active   famotidine (PEPCID) 40 MG Tab 1/23/2020 Active   FARXIGA 5 MG Tab  Active   Frovatriptan Succinate (FROVA) 2.5 MG Tab 2 WEEKS Active   furosemide (LASIX) 20 MG Tab 1/24/2020 Active   gabapentin (NEURONTIN) 400 MG Cap 1/24/2020 Active   liothyronine (CYTOMEL) 25 MCG Tab 1/23/2020 Active   losartan (COZAAR) 100 MG Tab 1/24/2020 Active   magnesium oxide (MAG-OX) 400 MG Tab 1/23/2020 Active   meloxicam (MOBIC) 15 MG tablet 1/23/2020 Active   Menaquinone-7 (VITAMIN K2) 100 MCG Cap 2 WEEKS Active   montelukast (SINGULAIR) 10 MG Tab 1/23/2020 Active   multivitamin (THERAGRAN) Tab 1/24/2020 Active   pantoprazole (PROTONIX) 40 MG Tablet Delayed Response 1/23/2020 Active   potassium chloride SA (KDUR) 20 MEQ Tab CR 1/20/2020 Active   predniSONE (DELTASONE) 20 MG Tab 1/24/2020 Active   Probiotic Product (PROBIOTIC PO) 1/23/2020 Active   Somatropin 10 MG Recon Soln 1/23/2020 Active   spironolactone (ALDACTONE) 25 MG Tab 1/23/2020 Active   SYNTHROID 75 MCG Tab 1/24/2020 Active   tizanidine (ZANAFLEX) 4 MG Tab 1/23/2020 Active                PAST MEDICAL HISTORY   has a past medical history of AAA (abdominal aortic aneurysm) (HCC), Asthma, Chronic pain, Congestive heart failure (HCC), Fibromyalgia, GERD (gastroesophageal reflux disease), Hypertension, Migraine, Osteoporosis, Renal disorder, and Urticaria.    SURGICAL HISTORY   has a past surgical history that includes appendectomy (1974); gastroscopy (N/A, 2/7/2019); hysterectomy, total abdominal (1995); gastric bypass laparoscopic (1999); abdominal exploration (2002); shoulder decompression arthroscopic (Left, 2/19/2019); clavicle distal excision (Left, 2/19/2019); and shoulder arthroscopy w/ bicipital tenodesis repair (Left, 2/19/2019).    SOCIAL HISTORY  Social History     Tobacco Use   •  "Smoking status: Never Smoker   • Smokeless tobacco: Never Used   Substance and Sexual Activity   • Alcohol use: Not Currently   • Drug use: No   • Sexual activity: Not on file           REVIEW OF SYSTEMS  See HPI for further details.  All other systems are negative except as above in HPI.      PHYSICAL EXAM  VITAL SIGNS: BP (!) 189/102   Pulse 65   Temp 36.2 °C (97.1 °F) (Oral)   Resp (!) 21   Ht 1.626 m (5' 4\")   Wt 85.2 kg (187 lb 13.3 oz)   LMP 02/07/2016 (Within Months)   SpO2 96%   BMI 32.24 kg/m²     General:  WD overweight, nontoxic appearing in NAD; A+Ox3; V/S as above; afebrile, not hypoxic or tachycardic; hypertensive  Skin: Cool and dry; slightly pale color; no rash  HEENT: NCAT; EOMs intact; PERRL; no scleral icterus; hoarse voice  Neck: FROM; no meningismus, no LAD; no stridor  Cardiovascular: Regular heart rate and rhythm.  No murmurs, rubs, or gallops; pulses 2+ bilaterally radially  Lungs: End expiratory wheeze with decreased air movement bilaterally.  No wheezes, rhonchi, or rales.   Abdomen: BS present; soft; NTND; no rebound, guarding, or rigidity.  No organomegaly or pulsatile mass  Extremities: MAHARAJ x 4; no e/o trauma; no pedal edema; neg Atiya's  Neurologic: CNs III-XII grossly intact; speech clear; distal sensation intact; strength 5/5 UE/LEs  Psychiatric: Appropriate affect, normal mood    LABS  Results for orders placed or performed during the hospital encounter of 01/24/20   CBC WITH DIFFERENTIAL   Result Value Ref Range    WBC 7.3 4.8 - 10.8 K/uL    RBC 4.16 (L) 4.20 - 5.40 M/uL    Hemoglobin 11.1 (L) 12.0 - 16.0 g/dL    Hematocrit 35.3 (L) 37.0 - 47.0 %    MCV 84.9 81.4 - 97.8 fL    MCH 26.7 (L) 27.0 - 33.0 pg    MCHC 31.4 (L) 33.6 - 35.0 g/dL    RDW 46.8 35.9 - 50.0 fL    Platelet Count 307 164 - 446 K/uL    MPV 10.2 9.0 - 12.9 fL    Neutrophils-Polys 81.90 (H) 44.00 - 72.00 %    Lymphocytes 9.60 (L) 22.00 - 41.00 %    Monocytes 7.80 0.00 - 13.40 %    Eosinophils 0.10 0.00 " - 6.90 %    Basophils 0.10 0.00 - 1.80 %    Immature Granulocytes 0.50 0.00 - 0.90 %    Nucleated RBC 0.00 /100 WBC    Neutrophils (Absolute) 5.96 2.00 - 7.15 K/uL    Lymphs (Absolute) 0.70 (L) 1.00 - 4.80 K/uL    Monos (Absolute) 0.57 0.00 - 0.85 K/uL    Eos (Absolute) 0.01 0.00 - 0.51 K/uL    Baso (Absolute) 0.01 0.00 - 0.12 K/uL    Immature Granulocytes (abs) 0.04 0.00 - 0.11 K/uL    NRBC (Absolute) 0.00 K/uL   CMP   Result Value Ref Range    Sodium 131 (L) 135 - 145 mmol/L    Potassium 4.3 3.6 - 5.5 mmol/L    Chloride 98 96 - 112 mmol/L    Co2 18 (L) 20 - 33 mmol/L    Anion Gap 15.0 (H) 0.0 - 11.9    Glucose 110 (H) 65 - 99 mg/dL    Bun 18 8 - 22 mg/dL    Creatinine 1.03 0.50 - 1.40 mg/dL    Calcium 8.7 8.4 - 10.2 mg/dL    AST(SGOT) 35 12 - 45 U/L    ALT(SGPT) 27 2 - 50 U/L    Alkaline Phosphatase 87 30 - 99 U/L    Total Bilirubin 0.2 0.1 - 1.5 mg/dL    Albumin 3.8 3.2 - 4.9 g/dL    Total Protein 6.6 6.0 - 8.2 g/dL    Globulin 2.8 1.9 - 3.5 g/dL    A-G Ratio 1.4 g/dL   TROPONIN   Result Value Ref Range    Troponin T <6 6 - 19 ng/L   proBrain Natriuretic Peptide, NT   Result Value Ref Range    NT-proBNP 410 (H) 0 - 125 pg/mL   ESTIMATED GFR   Result Value Ref Range    GFR If African American >60 >60 mL/min/1.73 m 2    GFR If Non  56 (A) >60 mL/min/1.73 m 2   TSH   Result Value Ref Range    TSH 0.449 0.380 - 5.330 uIU/mL   EKG   Result Value Ref Range    Report       [facility]    Test Date:  2020  Pt Name:    CARMINA MORAN              Department: [facility]  MRN:        [id number]                      [facility]  Gender:     Female                       Technician: [id number]  :        1965                   Requested By:ER TRIAGE PROTOCOL  Order #:    [id number]                    Reading MD: MARIELLE BENAVIDEZ MD    Measurements  Intervals                                Axis  Rate:       75                           P:          59  RI:          206                          QRS:        12  QRSD:       93                           T:          42  QT:         374  QTc:        418    Interpretive Statements  Sinus rhythm  Borderline prolonged ME interval  Compared to ECG 01/21/2020 10:50:08  T-wave abnormality no longer present  Electronically Signed On 1- 14:07:44 PST by MARIELLE BENAVIDEZ MD         IMAGING  CT-CTA CHEST PULMONARY ARTERY W/ RECONS   Final Result         1.  No evidence of pulmonary embolism.   2.  Stable 4.1 cm ascending thoracic aortic aneurysm.   3.  Right lower lobe atelectasis. No significant consolidation.            DX-CHEST-PORTABLE (1 VIEW)   Final Result      Negative single view of the chest.          MEDICAL RECORD  I have reviewed patient's medical record and pertinent results are listed below.      COURSE & MEDICAL DECISION MAKING  I have reviewed any medical record information, laboratory studies and radiographic results as noted.    Donna Isaac is a 54 y.o. female who presents complaining of shortness of breath, chest pain, weight gain, and continued cough.  Patient is afebrile and not hypoxic.  Given patient's history of reactive airways and recent viral URI, nebulizer was ordered along with Tessalon Perles.  Patient has been on prednisone for the last 2 days.    EKG demonstrates no acute ST changes and no arrhythmia.    Chest x-ray negative for acute pathology.  Given recent hospitalization and persistent shortness of breath now with chest pain, PE was considered and CT was ordered.  Patient reports anaphylaxis with contrast dye.  Solu-Medrol and Benadryl were ordered.    5:22 PM  No PE on CT. No focal consolidation.  Labs demonstrate a hemoglobin of 11.1 reflecting anemia and a drop from her prior level on 1/22 of 12.6, sodium 131, CO2 18, anion gap 15 with a BNP of 410.  Troponin is negative.    5:32 PM  Patient was reevaluated and advised of the plan to hospitalize her.  She reports feeling improved after  the nebulizer.  She is amenable to the plan.  I discussed the case with Dr. Fox who agrees to hospitalize the patient.      FINAL IMPRESSION  1. Shortness of breath     2. Chest pain, unspecified type     3. Anemia, unspecified type         Electronically signed by: Etta Rdz M.D., 1/24/2020 1:23 PM

## 2020-01-24 NOTE — ED TRIAGE NOTES
Pt comes in c/o continues SOB and now weight gain (about  10 pds in the last couple of days)  Was a pt here on 1/21 and d/c'ed 1/22  Not getting better

## 2020-01-25 LAB
ANION GAP SERPL CALC-SCNC: 14 MMOL/L (ref 0–11.9)
BASOPHILS # BLD AUTO: 0.1 % (ref 0–1.8)
BASOPHILS # BLD: 0.01 K/UL (ref 0–0.12)
BUN SERPL-MCNC: 17 MG/DL (ref 8–22)
CALCIUM SERPL-MCNC: 9.5 MG/DL (ref 8.4–10.2)
CHLORIDE SERPL-SCNC: 96 MMOL/L (ref 96–112)
CO2 SERPL-SCNC: 23 MMOL/L (ref 20–33)
CREAT SERPL-MCNC: 0.92 MG/DL (ref 0.5–1.4)
EOSINOPHIL # BLD AUTO: 0 K/UL (ref 0–0.51)
EOSINOPHIL NFR BLD: 0 % (ref 0–6.9)
ERYTHROCYTE [DISTWIDTH] IN BLOOD BY AUTOMATED COUNT: 45.5 FL (ref 35.9–50)
GLUCOSE SERPL-MCNC: 134 MG/DL (ref 65–99)
GRAM STN SPEC: NORMAL
HCT VFR BLD AUTO: 37.6 % (ref 37–47)
HGB BLD-MCNC: 12 G/DL (ref 12–16)
IMM GRANULOCYTES # BLD AUTO: 0.03 K/UL (ref 0–0.11)
IMM GRANULOCYTES NFR BLD AUTO: 0.4 % (ref 0–0.9)
LYMPHOCYTES # BLD AUTO: 1.03 K/UL (ref 1–4.8)
LYMPHOCYTES NFR BLD: 13.1 % (ref 22–41)
MCH RBC QN AUTO: 26.5 PG (ref 27–33)
MCHC RBC AUTO-ENTMCNC: 31.9 G/DL (ref 33.6–35)
MCV RBC AUTO: 83 FL (ref 81.4–97.8)
MONOCYTES # BLD AUTO: 0.5 K/UL (ref 0–0.85)
MONOCYTES NFR BLD AUTO: 6.4 % (ref 0–13.4)
NEUTROPHILS # BLD AUTO: 6.28 K/UL (ref 2–7.15)
NEUTROPHILS NFR BLD: 80 % (ref 44–72)
NRBC # BLD AUTO: 0 K/UL
NRBC BLD-RTO: 0 /100 WBC
PLATELET # BLD AUTO: 344 K/UL (ref 164–446)
PMV BLD AUTO: 10.6 FL (ref 9–12.9)
POTASSIUM SERPL-SCNC: 4.1 MMOL/L (ref 3.6–5.5)
RBC # BLD AUTO: 4.53 M/UL (ref 4.2–5.4)
SIGNIFICANT IND 70042: NORMAL
SITE SITE: NORMAL
SODIUM SERPL-SCNC: 133 MMOL/L (ref 135–145)
SOURCE SOURCE: NORMAL
WBC # BLD AUTO: 7.9 K/UL (ref 4.8–10.8)

## 2020-01-25 PROCEDURE — 36415 COLL VENOUS BLD VENIPUNCTURE: CPT

## 2020-01-25 PROCEDURE — 700102 HCHG RX REV CODE 250 W/ 637 OVERRIDE(OP): Performed by: INTERNAL MEDICINE

## 2020-01-25 PROCEDURE — 87070 CULTURE OTHR SPECIMN AEROBIC: CPT

## 2020-01-25 PROCEDURE — 94760 N-INVAS EAR/PLS OXIMETRY 1: CPT

## 2020-01-25 PROCEDURE — 96372 THER/PROPH/DIAG INJ SC/IM: CPT

## 2020-01-25 PROCEDURE — 94640 AIRWAY INHALATION TREATMENT: CPT

## 2020-01-25 PROCEDURE — 87186 SC STD MICRODIL/AGAR DIL: CPT

## 2020-01-25 PROCEDURE — 85025 COMPLETE CBC W/AUTO DIFF WBC: CPT

## 2020-01-25 PROCEDURE — 80048 BASIC METABOLIC PNL TOTAL CA: CPT

## 2020-01-25 PROCEDURE — 700111 HCHG RX REV CODE 636 W/ 250 OVERRIDE (IP): Performed by: INTERNAL MEDICINE

## 2020-01-25 PROCEDURE — 99226 PR SUBSEQUENT OBSERVATION CARE,LEVEL III: CPT | Performed by: INTERNAL MEDICINE

## 2020-01-25 PROCEDURE — 87147 CULTURE TYPE IMMUNOLOGIC: CPT

## 2020-01-25 PROCEDURE — G0378 HOSPITAL OBSERVATION PER HR: HCPCS

## 2020-01-25 PROCEDURE — A9270 NON-COVERED ITEM OR SERVICE: HCPCS

## 2020-01-25 PROCEDURE — 87205 SMEAR GRAM STAIN: CPT

## 2020-01-25 PROCEDURE — 87077 CULTURE AEROBIC IDENTIFY: CPT

## 2020-01-25 PROCEDURE — A9270 NON-COVERED ITEM OR SERVICE: HCPCS | Performed by: INTERNAL MEDICINE

## 2020-01-25 PROCEDURE — 700102 HCHG RX REV CODE 250 W/ 637 OVERRIDE(OP)

## 2020-01-25 PROCEDURE — 700101 HCHG RX REV CODE 250: Performed by: INTERNAL MEDICINE

## 2020-01-25 RX ORDER — FAMOTIDINE 20 MG/1
TABLET, FILM COATED ORAL
Status: COMPLETED
Start: 2020-01-25 | End: 2020-01-25

## 2020-01-25 RX ORDER — FUROSEMIDE 40 MG/1
40 TABLET ORAL
Status: DISCONTINUED | OUTPATIENT
Start: 2020-01-25 | End: 2020-01-26 | Stop reason: HOSPADM

## 2020-01-25 RX ADMIN — MELOXICAM 15 MG: 7.5 TABLET ORAL at 17:13

## 2020-01-25 RX ADMIN — ALBUTEROL SULFATE 2.5 MG: 2.5 SOLUTION RESPIRATORY (INHALATION) at 06:57

## 2020-01-25 RX ADMIN — MELOXICAM 15 MG: 7.5 TABLET ORAL at 00:29

## 2020-01-25 RX ADMIN — ACETAMINOPHEN 650 MG: 325 TABLET, FILM COATED ORAL at 22:29

## 2020-01-25 RX ADMIN — MONTELUKAST SODIUM 10 MG: 10 TABLET, FILM COATED ORAL at 17:12

## 2020-01-25 RX ADMIN — FUROSEMIDE 10 MG: 20 TABLET ORAL at 00:29

## 2020-01-25 RX ADMIN — GABAPENTIN 400 MG: 400 CAPSULE ORAL at 05:26

## 2020-01-25 RX ADMIN — CARVEDILOL 25 MG: 6.25 TABLET, FILM COATED ORAL at 00:30

## 2020-01-25 RX ADMIN — GABAPENTIN 400 MG: 400 CAPSULE ORAL at 17:12

## 2020-01-25 RX ADMIN — GABAPENTIN 400 MG: 400 CAPSULE ORAL at 11:42

## 2020-01-25 RX ADMIN — COLESEVELAM HYDROCHLORIDE 625 MG: 625 TABLET, FILM COATED ORAL at 07:37

## 2020-01-25 RX ADMIN — DULOXETINE HYDROCHLORIDE 60 MG: 30 CAPSULE, DELAYED RELEASE ORAL at 17:13

## 2020-01-25 RX ADMIN — FAMOTIDINE 40 MG: 20 TABLET ORAL at 19:29

## 2020-01-25 RX ADMIN — DULOXETINE HYDROCHLORIDE 60 MG: 30 CAPSULE, DELAYED RELEASE ORAL at 00:31

## 2020-01-25 RX ADMIN — FAMOTIDINE 40 MG: 20 TABLET ORAL at 00:37

## 2020-01-25 RX ADMIN — FUROSEMIDE 40 MG: 40 TABLET ORAL at 17:13

## 2020-01-25 RX ADMIN — SPIRONOLACTONE 25 MG: 25 TABLET ORAL at 05:27

## 2020-01-25 RX ADMIN — MELATONIN 2000 UNITS: at 05:27

## 2020-01-25 RX ADMIN — LEVOTHYROXINE SODIUM 75 MCG: 50 TABLET ORAL at 05:27

## 2020-01-25 RX ADMIN — COLESEVELAM HYDROCHLORIDE 625 MG: 625 TABLET, FILM COATED ORAL at 17:17

## 2020-01-25 RX ADMIN — MONTELUKAST SODIUM 10 MG: 10 TABLET, FILM COATED ORAL at 00:30

## 2020-01-25 RX ADMIN — CARVEDILOL 25 MG: 6.25 TABLET, FILM COATED ORAL at 07:36

## 2020-01-25 RX ADMIN — GUAIFENESIN AND DEXTROMETHORPHAN 10 ML: 100; 10 SYRUP ORAL at 00:38

## 2020-01-25 RX ADMIN — BENZONATATE 100 MG: 100 CAPSULE ORAL at 19:30

## 2020-01-25 RX ADMIN — PREDNISONE 40 MG: 10 TABLET ORAL at 05:26

## 2020-01-25 RX ADMIN — ALBUTEROL SULFATE 2.5 MG: 2.5 SOLUTION RESPIRATORY (INHALATION) at 14:02

## 2020-01-25 RX ADMIN — LOSARTAN POTASSIUM 100 MG: 25 TABLET ORAL at 05:25

## 2020-01-25 RX ADMIN — CARVEDILOL 25 MG: 6.25 TABLET, FILM COATED ORAL at 17:12

## 2020-01-25 RX ADMIN — ESTRADIOL 0.5 MG: 1 TABLET ORAL at 05:28

## 2020-01-25 RX ADMIN — COLESEVELAM HYDROCHLORIDE 625 MG: 625 TABLET, FILM COATED ORAL at 11:42

## 2020-01-25 RX ADMIN — FUROSEMIDE 10 MG: 20 TABLET ORAL at 05:26

## 2020-01-25 RX ADMIN — GUAIFENESIN AND DEXTROMETHORPHAN 10 ML: 100; 10 SYRUP ORAL at 07:39

## 2020-01-25 RX ADMIN — LIOTHYRONINE SODIUM 25 MCG: 5 TABLET ORAL at 05:34

## 2020-01-25 RX ADMIN — ENOXAPARIN SODIUM 40 MG: 100 INJECTION SUBCUTANEOUS at 05:30

## 2020-01-25 ASSESSMENT — ENCOUNTER SYMPTOMS
NAUSEA: 0
WEIGHT LOSS: 0
DIARRHEA: 0
SPUTUM PRODUCTION: 1
CHILLS: 0
BACK PAIN: 0
DIZZINESS: 0
ABDOMINAL PAIN: 0
HEARTBURN: 0
NECK PAIN: 0
SPEECH CHANGE: 0
PND: 0
SEIZURES: 0
VOMITING: 0
WHEEZING: 0
HEADACHES: 0
TREMORS: 0
EYE PAIN: 0
FALLS: 0
CONSTIPATION: 0
WEAKNESS: 1
COUGH: 1
PALPITATIONS: 0
DEPRESSION: 0
SHORTNESS OF BREATH: 1
FEVER: 0
BLURRED VISION: 0

## 2020-01-25 ASSESSMENT — COPD QUESTIONNAIRES
COPD SCREENING SCORE: 2
DURING THE PAST 4 WEEKS HOW MUCH DID YOU FEEL SHORT OF BREATH: SOME OF THE TIME
HAVE YOU SMOKED AT LEAST 100 CIGARETTES IN YOUR ENTIRE LIFE: NO/DON'T KNOW
DO YOU EVER COUGH UP ANY MUCUS OR PHLEGM?: NO/ONLY WITH OCCASIONAL COLDS OR INFECTIONS

## 2020-01-25 ASSESSMENT — COGNITIVE AND FUNCTIONAL STATUS - GENERAL
DAILY ACTIVITIY SCORE: 24
SUGGESTED CMS G CODE MODIFIER MOBILITY: CH
SUGGESTED CMS G CODE MODIFIER DAILY ACTIVITY: CH
MOBILITY SCORE: 24

## 2020-01-25 ASSESSMENT — PATIENT HEALTH QUESTIONNAIRE - PHQ9
1. LITTLE INTEREST OR PLEASURE IN DOING THINGS: NOT AT ALL
2. FEELING DOWN, DEPRESSED, IRRITABLE, OR HOPELESS: NOT AT ALL
SUM OF ALL RESPONSES TO PHQ9 QUESTIONS 1 AND 2: 0
SUM OF ALL RESPONSES TO PHQ9 QUESTIONS 1 AND 2: 0
2. FEELING DOWN, DEPRESSED, IRRITABLE, OR HOPELESS: NOT AT ALL
1. LITTLE INTEREST OR PLEASURE IN DOING THINGS: NOT AT ALL

## 2020-01-25 ASSESSMENT — LIFESTYLE VARIABLES
SUBSTANCE_ABUSE: 0
EVER_SMOKED: NEVER

## 2020-01-25 NOTE — PROGRESS NOTES
[facility] Medicine Daily Progress Note    Date of Service  1/25/2020    Chief Complaint  54 y.o. female admitted 1/24/2020 with shortness of breath, cough, fluid overload    Hospital Course    Past medical history of obesity presented with complaint ofShortness of breath and fluid overload.  Patient was recently discharged home with steroid bronchodilator after URI and asthma exacerbation however patient came back with signs of fluid overload according to patient.      Interval Problem Update  1/25.  Patient has been stable overnight however still complains of wheezy and shortness of breath and cough.  Patient overall remained relatively comfortable currently but still required steroid.  Patient feels slightly improved in terms of congestion. Patient's pain is general 3-5/10, intermittent and does not radiate to other location, sharp and with some tingling. Can be controlled by pain meds.     Consultants/Specialty  none    Code Status  full    Disposition  tbd    Review of Systems  Review of Systems   Constitutional: Positive for malaise/fatigue. Negative for chills, fever and weight loss.   HENT: Negative for congestion, ear discharge, ear pain, hearing loss and nosebleeds.    Eyes: Negative for blurred vision and pain.   Respiratory: Positive for cough, sputum production and shortness of breath. Negative for wheezing.    Cardiovascular: Positive for leg swelling. Negative for chest pain, palpitations and PND.   Gastrointestinal: Negative for abdominal pain, constipation, diarrhea, heartburn, nausea and vomiting.   Genitourinary: Negative for dysuria, frequency and hematuria.   Musculoskeletal: Negative for back pain, falls, joint pain and neck pain.   Skin: Negative for rash.   Neurological: Positive for weakness. Negative for dizziness, tremors, speech change, seizures and headaches.   Psychiatric/Behavioral: Negative for depression, substance abuse and suicidal ideas.        Physical Exam  Temp:  [36.7 °C (98  °F)-36.8 °C (98.3 °F)] 36.8 °C (98.3 °F)  Pulse:  [] 64  Resp:  [14-56] 20  BP: ()/() 98/74  SpO2:  [94 %-98 %] 95 %    Physical Exam  Vitals signs and nursing note reviewed.   Constitutional:       General: She is not in acute distress.     Appearance: Normal appearance. She is normal weight.   HENT:      Head: Normocephalic and atraumatic.      Right Ear: Tympanic membrane, ear canal and external ear normal.      Left Ear: Tympanic membrane, ear canal and external ear normal.      Nose: Nose normal.      Mouth/Throat:      Mouth: Mucous membranes are moist.      Pharynx: Oropharynx is clear.   Eyes:      Extraocular Movements: Extraocular movements intact.      Conjunctiva/sclera: Conjunctivae normal.      Pupils: Pupils are equal, round, and reactive to light.   Neck:      Musculoskeletal: Normal range of motion and neck supple. No neck rigidity.      Vascular: No carotid bruit.   Cardiovascular:      Rate and Rhythm: Normal rate and regular rhythm.      Pulses: Normal pulses.      Heart sounds: Normal heart sounds. No murmur. No friction rub. No gallop.    Pulmonary:      Effort: Pulmonary effort is normal. No respiratory distress.      Breath sounds: No stridor. Rhonchi present. No wheezing or rales.   Chest:      Chest wall: No tenderness.   Abdominal:      General: Abdomen is flat. Bowel sounds are normal. There is no distension.      Palpations: Abdomen is soft. There is no mass.      Tenderness: There is no tenderness. There is no guarding or rebound.      Hernia: No hernia is present.   Musculoskeletal: Normal range of motion.         General: No swelling.   Lymphadenopathy:      Cervical: No cervical adenopathy.   Skin:     General: Skin is warm.      Capillary Refill: Capillary refill takes more than 3 seconds.      Coloration: Skin is not jaundiced or pale.   Neurological:      General: No focal deficit present.      Mental Status: She is alert and oriented to person, place, and time.  Mental status is at baseline.   Psychiatric:         Mood and Affect: Mood normal.         Behavior: Behavior normal.         Fluids    Intake/Output Summary (Last 24 hours) at 1/25/2020 1504  Last data filed at 1/25/2020 0500  Gross per 24 hour   Intake 570 ml   Output --   Net 570 ml       Laboratory  Recent Labs     01/24/20  1336 01/25/20  0402   WBC 7.3 7.9   RBC 4.16* 4.53   HEMOGLOBIN 11.1* 12.0   HEMATOCRIT 35.3* 37.6   MCV 84.9 83.0   MCH 26.7* 26.5*   MCHC 31.4* 31.9*   RDW 46.8 45.5   PLATELETCT 307 344   MPV 10.2 10.6     Recent Labs     01/24/20  0845 01/24/20  1336 01/25/20  0402   SODIUM 138 131* 133*   POTASSIUM 4.0 4.3 4.1   CHLORIDE 103 98 96   CO2 24 18* 23   GLUCOSE 92 110* 134*   BUN 22 18 17   CREATININE 1.24 1.03 0.92   CALCIUM 9.2 8.7 9.5                   Imaging  CT-CTA CHEST PULMONARY ARTERY W/ RECONS   Final Result         1.  No evidence of pulmonary embolism.   2.  Stable 4.1 cm ascending thoracic aortic aneurysm.   3.  Right lower lobe atelectasis. No significant consolidation.            DX-CHEST-PORTABLE (1 VIEW)   Final Result      Negative single view of the chest.           Assessment/Plan  Chronic systolic heart failure (HCC)- (present on admission)  Assessment & Plan  Ejection fraction is now improved to normal at 75% on echocardiogram 12/30  Will continue coreg, cozaar and aldactone  Patient said lower extremity edema resolved after being admitted to the hospital    Normocytic anemia  Assessment & Plan  Will monitor labs, no evidence of bleed, likely due to inflammation    Chronic sinusitis- (present on admission)  Assessment & Plan  Nasal spray as needed  Sputum culture as the patient is coughing thick phlegm  Continue to breathing treatment    Gastroesophageal reflux disease without esophagitis- (present on admission)  Assessment & Plan  pepcid to continue    Acute respiratory failure with mild hypoxemia:  -Currently resolved.  -Continue breathing treatment and  bronchodilator.    VTE prophylaxis: scd      Current Facility-Administered Medications:   •  albuterol (PROVENTIL) 2.5mg/0.5ml nebulizer solution 2.5 mg, 2.5 mg, Nebulization, Q4H PRN (RT), Kelli Fox M.D., 2.5 mg at 01/25/20 1402  •  furosemide (LASIX) tablet 40 mg, 40 mg, Oral, BID DIURETIC, Lary Bazzi M.D.  •  albuterol inhaler 2 Puff, 2 Puff, Inhalation, Q6HRS PRN, Kelli Fox M.D.  •  carvedilol (COREG) tablet 25 mg, 25 mg, Oral, BID WITH MEALS, Kelli Fox M.D., 25 mg at 01/25/20 0736  •  vitamin D (cholecalciferol) 1000 UNIT tablet 2,000 Units, 2,000 Units, Oral, DAILY, Kelli Fox M.D., 2,000 Units at 01/25/20 0527  •  colesevelam (WELCHOL) tablet 625 mg, 625 mg, Oral, TID WITH MEALS, Kelli Fox M.D., 625 mg at 01/25/20 1142  •  tizanidine (ZANAFLEX) tablet 2 mg, 2 mg, Oral, Q6HRS PRN, Kelli Fox M.D.  •  levothyroxine (SYNTHROID) tablet 75 mcg, 75 mcg, Oral, AM ES, Kelli Fox M.D., 75 mcg at 01/25/20 0527  •  spironolactone (ALDACTONE) tablet 25 mg, 25 mg, Oral, DAILY, Kelli Fox M.D., 25 mg at 01/25/20 0527  •  montelukast (SINGULAIR) tablet 10 mg, 10 mg, Oral, Q EVENING, Kelli Fox M.D., 10 mg at 01/25/20 0030  •  meloxicam (MOBIC) tablet 15 mg, 15 mg, Oral, Q EVENING, Kelli Fox M.D., 15 mg at 01/25/20 0029  •  losartan (COZAAR) tablet 100 mg, 100 mg, Oral, DAILY, Kelli Fox M.D., 100 mg at 01/25/20 0525  •  liothyronine (CYTOMEL) tablet 25 mcg, 25 mcg, Oral, DAILY, Kelli Fox M.D., 25 mcg at 01/25/20 0534  •  gabapentin (NEURONTIN) capsule 400 mg, 400 mg, Oral, TID, Kelli Fox M.D., 400 mg at 01/25/20 1142  •  famotidine (PEPCID) tablet 40 mg, 40 mg, Oral, QHS, Kelli Fox M.D., 40 mg at 01/25/20 0037  •  estradiol (ESTRACE) tablet 0.5 mg, 0.5 mg, Oral, DAILY, Kelli Fox M.D., 0.5 mg at 01/25/20 0528  •  DULoxetine (CYMBALTA) capsule 60 mg, 60 mg, Oral, Q EVENING, Kelli Fox M.D., 60 mg at 01/25/20 0031  •  senna-docusate  (PERICOLACE or SENOKOT S) 8.6-50 MG per tablet 2 Tab, 2 Tab, Oral, BID **AND** polyethylene glycol/lytes (MIRALAX) PACKET 1 Packet, 1 Packet, Oral, QDAY PRN **AND** magnesium hydroxide (MILK OF MAGNESIA) suspension 30 mL, 30 mL, Oral, QDAY PRN **AND** bisacodyl (DULCOLAX) suppository 10 mg, 10 mg, Rectal, QDAY PRN, Kelli Fox M.D.  •  Respiratory Care per Protocol, , Nebulization, Continuous RT, Kelli Fox M.D.  •  enoxaparin (LOVENOX) inj 40 mg, 40 mg, Subcutaneous, DAILY, Kelli Fox M.D., 40 mg at 01/25/20 0530  •  acetaminophen (TYLENOL) tablet 650 mg, 650 mg, Oral, Q6HRS PRN, Kelli Fox M.D.  •  enalaprilat (VASOTEC) injection 1.25 mg, 1.25 mg, Intravenous, Q6HRS PRN, Kelli Fox M.D.  •  ondansetron (ZOFRAN) syringe/vial injection 4 mg, 4 mg, Intravenous, Q4HRS PRN, Kelli Fox M.D.  •  ondansetron (ZOFRAN ODT) dispertab 4 mg, 4 mg, Oral, Q4HRS PRN, Kelli Fox M.D.  •  promethazine (PHENERGAN) tablet 12.5-25 mg, 12.5-25 mg, Oral, Q4HRS PRN, Kelli Fox M.D.  •  promethazine (PHENERGAN) suppository 12.5-25 mg, 12.5-25 mg, Rectal, Q4HRS PRN, Kelli Fox M.D.  •  prochlorperazine (COMPAZINE) injection 5-10 mg, 5-10 mg, Intravenous, Q4HRS PRN, Kelli Fox M.D.  •  guaiFENesin dextromethorphan (ROBITUSSIN DM) 100-10 MG/5ML syrup 10 mL, 10 mL, Oral, Q6HRS PRN, Kelli Fox M.D., 10 mL at 01/25/20 0739  •  benzonatate (TESSALON) capsule 100 mg, 100 mg, Oral, TID PRN, Kelli Fox M.D.  •  sodium chloride (OCEAN) 0.65 % nasal spray 2 Spray, 2 Spray, Nasal, Q2HRS PRN, Kelli Fox M.D.

## 2020-01-25 NOTE — PROGRESS NOTES
"Per pharmacist Mindy, we do not carry Evoxac. Updated patient and pt states it's ok to not have while she's here, \"it's ok you guys never have that one.\" Updated pt that she can always have a family member bring in the medication to be verified by pharmacy if she changes her mind.   "

## 2020-01-25 NOTE — ASSESSMENT & PLAN NOTE
Ejection fraction is now improved to normal at 75% on echocardiogram 12/30  Will continue coreg, cozaar and aldactone

## 2020-01-25 NOTE — H&P
Hospital Medicine History & Physical Note    Date of Service  1/24/2020    Primary Care Physician  Madisyn Mccullough M.D.    Consultants  none    Code Status  full    Chief Complaint  Swelling and bloating    History of Presenting Illness  54 y.o. female who presented 1/24/2020 with worsened shortness of breath. She was admitted to the hospital two days ago and discharged with steroids for an acute upper respiratory infection. She was feeling better on discharge but today she felt more short of breath with thick sputum production, a hoarse voice, and abdominal swelling. She feels she gained about 10 pounds since starting the steroids. She denies any fever, chills, headache, rash, abdominal pain, or diarrhea.    Review of Systems  Review of Systems   Constitutional: Positive for malaise/fatigue. Negative for chills, diaphoresis and fever.   HENT: Positive for congestion. Negative for sinus pain and sore throat.         Hoarse voice   Respiratory: Positive for cough, sputum production and shortness of breath. Negative for hemoptysis, wheezing and stridor.    Cardiovascular: Positive for chest pain and leg swelling. Negative for palpitations and orthopnea.        Chest tightness with cough   Gastrointestinal: Negative for abdominal pain, diarrhea, heartburn and nausea.        Abdominal bloating   Genitourinary: Negative for frequency, hematuria and urgency.   Musculoskeletal: Negative for back pain, joint pain and myalgias.   Skin: Negative for itching and rash.   Neurological: Negative for dizziness, tremors, sensory change, focal weakness and weakness.   Endo/Heme/Allergies: Does not bruise/bleed easily.   Psychiatric/Behavioral: Negative for depression. The patient is not nervous/anxious and does not have insomnia.    All other systems reviewed and are negative.      Past Medical History   has a past medical history of AAA (abdominal aortic aneurysm) (HCC), Asthma, Chronic pain, Congestive heart failure (HCC),  "Fibromyalgia, GERD (gastroesophageal reflux disease), Hypertension, Migraine, Osteoporosis, Renal disorder, and Urticaria.    Surgical History   has a past surgical history that includes appendectomy (1974); gastroscopy (N/A, 2/7/2019); hysterectomy, total abdominal (1995); gastric bypass laparoscopic (1999); abdominal exploration (2002); shoulder decompression arthroscopic (Left, 2/19/2019); clavicle distal excision (Left, 2/19/2019); and shoulder arthroscopy w/ bicipital tenodesis repair (Left, 2/19/2019).     Family History  family history includes Diabetes in her mother; Heart Attack in her brother; Heart Disease in her brother and mother; Heart Failure in her mother; Hypertension in her brother, father, and mother.     Social History   reports that she has never smoked. She has never used smokeless tobacco. She reports previous alcohol use. She reports that she does not use drugs.    Allergies  Allergies   Allergen Reactions   • Bactrim [Sulfamethoxazole W-Trimethoprim] Shortness of Breath   • Bee Venom Anaphylaxis   • Buprenorphine Anaphylaxis   • Doxycycline Hives and Shortness of Breath   • Econazole Anaphylaxis   • Floxin [Ofloxacin] Anaphylaxis, Shortness of Breath and Swelling     Cause throat swelling and difficulty breathing   • Gadolinium Derivatives Hives and Swelling     Throat swelling   • Iodine Shortness of Breath and Anaphylaxis     Throat and tongue swelling with IV contrast   • Keflex Shortness of Breath   • Levaquin Shortness of Breath     anaphlyaxis   • Levofloxacin Shortness of Breath   • Morphine Anaphylaxis   • Naloxone Hives     \"hives, SOB\"   • Nitrofurantoin Shortness of Breath     ...and hives     • Norco [Apap-Fd&C Yellow #10 Al Tai-Hydrocodone] Shortness of Breath     ...and hives     • Oxycodone Shortness of Breath     ...and hives     • Penicillins Shortness of Breath     ...and hives     • Tape Contact Dermatitis     Blisters, clear tegaderm ok.. No steristrips.   • Ancef " "[Cefazolin] Hives   • Bextra [Valdecoxib] Rash     \"Skin burn and peeling.\"   • Flagyl [Kdc:Metronidazole+Tartrazine] Hives   • Linezolid Rash     Rash all over body   • Other Drug Hives     \"Enconazole nitrate.\" shortness of breath and hives   • Other Misc Unspecified     Adhesive tape: blisters, skin peels off   • Clindamycin      HIVES     • Tygacil [Tigecycline]      itching   • Zithromax [Azithromycin] Hives and Shortness of Breath     Pt had Hives also       Medications  Prior to Admission Medications   Prescriptions Last Dose Informant Patient Reported? Taking?   AIMOVIG 70 MG/ML Solution Auto-injector 2020 Patient Yes No   Si mL by Injection route Q30 DAYS.   Biotin 5000 MCG Cap 2020 at 1130 Patient Yes No   Sig: Take 5,000 mcg by mouth every day.   CALCIUM-MAGNESIUM-ZINC PO 2020 at 2200 Patient Yes No   Sig: Take 1 Tab by mouth every day.   Cholecalciferol (VITAMIN D) 2000 units Cap 2020 at 2300 Patient Yes No   Sig: Take 2,000 Units by mouth every day.   Cyanocobalamin (B-12) 1000 MCG/ML Kit 2020 Patient Yes No   Si,000 mcg by Injection route every 7 days. On Wednesday   DULoxetine (CYMBALTA) 60 MG Cap DR Particles delayed-release capsule 2020 at 2300 Patient Yes No   Sig: Take 60 mg by mouth every evening.   Dexlansoprazole (DEXILANT) 60 MG CAPSULE DELAYED RELEASE delayed-release capsule 2020 at 2300 Patient Yes No   Sig: Take 60 mg by mouth every day.   EPINEPHrine (EPIPEN) 0.3 MG/0.3ML Solution Auto-injector solution for injection PRN at PRN Patient Yes No   Si.3 mg by Intramuscular route Once. Indications: Life-Threatening Hypersensitivity Reaction   FARXIGA 5 MG Tab   Yes No   Sig: Take 1 Tab by mouth every day.   Frovatriptan Succinate (FROVA) 2.5 MG Tab 2 WEEKS at PRN Patient Yes No   Sig: Take 2.5 mg by mouth as needed (For migraines).   Menaquinone-7 (VITAMIN K2) 100 MCG Cap 2 WEEKS at OUT Patient Yes No   Sig: Take 1 Tab by mouth every day. "   Probiotic Product (PROBIOTIC PO) 1/23/2020 at 2300 Patient Yes No   Sig: Take 1 Cap by mouth every day.   SYNTHROID 75 MCG Tab 1/24/2020 at 0700 Patient Yes No   Sig: Take 75 mcg by mouth Every morning on an empty stomach.   Somatropin 10 MG Recon Soln 1/23/2020 at 2300 Patient Yes No   Sig: Inject 10 mg as instructed every bedtime.   acetaminophen (TYLENOL) 325 MG Tab 1/20/2020 at PRN Patient Yes No   Sig: Take 650 mg by mouth every four hours as needed for Moderate Pain.   albuterol 108 (90 Base) MCG/ACT Aero Soln inhalation aerosol 1/24/2020 at 1130 Patient No No   Sig: Inhale 2 Puffs by mouth every 6 hours as needed for Shortness of Breath.   calcitonin, salmon, (MIACALCIN) 200 UNIT/ACT Solution 1/24/2020 at 1130 Patient Yes No   Sig: Spray 1 Spray in nose every bedtime.   carvedilol (COREG) 25 MG Tab 1/24/2020 at 0700 Patient Yes No   Sig: Take 25 mg by mouth 2 times a day, with meals.   cetirizine (KLS ALLER-HAWA) 10 MG Tab 1/24/2020 at 0700 Patient Yes No   Sig: Take 20 mg by mouth 2 times a day.   cevimeline (EVOXAC) 30 MG capsule 1/24/2020 at 1130 Patient No No   Sig: Take 1 Cap by mouth 3 times a day.   colesevelam (WELCHOL) 625 MG Tab 1/24/2020 at 1130 Patient Yes No   Sig: Take 625 mg by mouth 3 times a day, with meals.   estradiol (ESTRACE) 0.5 MG tablet 1/24/2020 at 0700 Patient Yes No   Sig: Take 0.5 mg by mouth every day.   famotidine (PEPCID) 40 MG Tab 1/23/2020 at 2300 Patient Yes No   Sig: Take 40 mg by mouth every bedtime.   furosemide (LASIX) 20 MG Tab 1/24/2020 at 1130 Patient No No   Sig: TAKE 0.5 TABS BY MOUTH 2 TIMES A DAY.   gabapentin (NEURONTIN) 400 MG Cap 1/24/2020 at 1130 Patient No No   Sig: Take 1 Cap by mouth 3 times a day.   liothyronine (CYTOMEL) 25 MCG Tab 1/23/2020 at 2300 Patient Yes No   Sig: Take 25 mcg by mouth every day.   losartan (COZAAR) 100 MG Tab 1/24/2020 at 0700 Patient No No   Sig: Take 1 Tab by mouth every day.   magnesium oxide (MAG-OX) 400 MG Tab 1/23/2020 at  2300 Patient Yes No   Sig: Take 400 mg by mouth every evening.   meloxicam (MOBIC) 15 MG tablet 1/23/2020 at 2300 Patient Yes No   Sig: Take 15 mg by mouth every evening.   montelukast (SINGULAIR) 10 MG Tab 1/23/2020 at 2300 Patient Yes No   Sig: Take 10 mg by mouth every evening.   multivitamin (THERAGRAN) Tab 1/24/2020 at 1130 Patient Yes No   Sig: Take 1 Tab by mouth every day.   pantoprazole (PROTONIX) 40 MG Tablet Delayed Response 1/23/2020 at 2300 Patient Yes No   Sig: Take 40 mg by mouth every evening.   potassium chloride SA (KDUR) 20 MEQ Tab CR 1/20/2020 at HOLD Patient No No   Sig: Take 1 Tab by mouth every day.   predniSONE (DELTASONE) 20 MG Tab 1/24/2020 at 0700 Patient No No   Sig: Take 2 Tabs by mouth every day for 4 days.   spironolactone (ALDACTONE) 25 MG Tab 1/23/2020 at 2300 Patient Yes No   Sig: Take 25 mg by mouth every day.   tizanidine (ZANAFLEX) 4 MG Tab 1/23/2020 at 2300 Patient Yes No   Sig: Take 2-4 mg by mouth every 6 hours as needed. Indications: Muscle Spasticity      Facility-Administered Medications: None       Physical Exam  Temp:  [36.2 °C (97.1 °F)] 36.2 °C (97.1 °F)  Pulse:  [63-96] 96  Resp:  [14-56] 39  BP: (111-193)/() 166/102  SpO2:  [95 %-98 %] 97 %    Physical Exam  Vitals signs and nursing note reviewed.   Constitutional:       Appearance: She is obese. She is not ill-appearing or diaphoretic.      Comments: Hoarse voice   HENT:      Nose: Congestion present. No rhinorrhea.      Mouth/Throat:      Mouth: Mucous membranes are moist.      Pharynx: No oropharyngeal exudate or posterior oropharyngeal erythema.   Eyes:      General: No scleral icterus.     Conjunctiva/sclera: Conjunctivae normal.      Pupils: Pupils are equal, round, and reactive to light.   Cardiovascular:      Rate and Rhythm: Normal rate and regular rhythm.      Pulses: Normal pulses.      Heart sounds: Normal heart sounds. No murmur.   Pulmonary:      Effort: Pulmonary effort is normal.      Breath  sounds: Normal breath sounds. No stridor. No rhonchi or rales.   Abdominal:      General: Bowel sounds are normal. There is no distension.      Tenderness: There is no tenderness.   Musculoskeletal:         General: No swelling, tenderness or deformity.   Skin:     Capillary Refill: Capillary refill takes less than 2 seconds.      Coloration: Skin is not jaundiced or pale.      Findings: No bruising or erythema.   Neurological:      General: No focal deficit present.      Mental Status: She is alert.      Motor: No weakness.      Coordination: Coordination normal.   Psychiatric:         Mood and Affect: Mood normal.         Thought Content: Thought content normal.         Laboratory:  Recent Labs     01/22/20  0359 01/24/20  1336   WBC 6.8 7.3   RBC 4.70 4.16*   HEMOGLOBIN 12.6 11.1*   HEMATOCRIT 40.3 35.3*   MCV 85.7 84.9   MCH 26.8* 26.7*   MCHC 31.3* 31.4*   RDW 48.3 46.8   PLATELETCT 343 307   MPV 10.7 10.2     Recent Labs     01/22/20  0359 01/24/20  1336   SODIUM 137 131*   POTASSIUM 4.6 4.3   CHLORIDE 99 98   CO2 21 18*   GLUCOSE 169* 110*   BUN 14 18   CREATININE 1.01 1.03   CALCIUM 9.7 8.7     Recent Labs     01/22/20  0359 01/24/20  1336   ALTSGPT  --  27   ASTSGOT  --  35   ALKPHOSPHAT  --  87   TBILIRUBIN  --  0.2   GLUCOSE 169* 110*         Recent Labs     01/24/20  1336   NTPROBNP 410*         Recent Labs     01/24/20  1336   TROPONINT <6       Urinalysis:    No results found     Imaging:  CT-CTA CHEST PULMONARY ARTERY W/ RECONS   Final Result         1.  No evidence of pulmonary embolism.   2.  Stable 4.1 cm ascending thoracic aortic aneurysm.   3.  Right lower lobe atelectasis. No significant consolidation.            DX-CHEST-PORTABLE (1 VIEW)   Final Result      Negative single view of the chest.            Assessment/Plan:  I anticipate this patient is appropriate for observation status at this time.    Chronic systolic heart failure (HCC)- (present on admission)  Assessment & Plan  Ejection  fraction is now improved to normal at 75% on echocardiogram 12/30  Will continue coreg, cozaar and aldactone    Normocytic anemia  Assessment & Plan  Will monitor labs, no evidence of bleed, likely due to inflammation    Chronic sinusitis- (present on admission)  Assessment & Plan  Nasal spray as needed  Sputum culture as the patient is coughing thick phlegm    Gastroesophageal reflux disease without esophagitis- (present on admission)  Assessment & Plan  pepcid to continue        VTE prophylaxis: lovenox

## 2020-01-25 NOTE — PROGRESS NOTES
"Received report from NOC RN; assumed pt care. Pt sitting up in bed, denies pain. States she is \"coughing a lot of junk up.\" Breath sounds clear through out. Pt notified to call for assistance.   "

## 2020-01-25 NOTE — CARE PLAN
Problem: Communication  Goal: The ability to communicate needs accurately and effectively will improve  Outcome: PROGRESSING AS EXPECTED  Note:   Pt oriented to unit and plan of care discussed. Educated on call light usage and encouraged to call for any questions, needs, or concerns.     Problem: Respiratory:  Goal: Respiratory status will improve  Outcome: PROGRESSING AS EXPECTED  Note:   Pt maintaining O2 saturations >90% on room air. Medicated with Robitussin for her cough. Pt informed on need for a sputum sample but has not coughed any phlegm up yet.

## 2020-01-25 NOTE — FLOWSHEET NOTE
01/25/20 0359   Type of Assessment   Assessment Yes   Patient History   Pulmonary Diagnosis Hx asthma CHF   Surgical Procedures none   Home O2 No   Home Treatments/Frequency Yes   MDI 1/Frequency Albuterol PRN   COPD Risk Screening   Do you have a history of COPD? No   COPD Population Screener   During the past 4 weeks, how much did you feel short of breath? 1   Do you ever cough up any mucus or phlegm? 0   In the past 12 months, you do less than you used to because of your breathing problems 0   Have you smoked at least 100 cigarettes in your entire life? 0   How old are you? 1   COPD Screening Score 2   Smoking History   Have you ever smoked Never   Level Of Consciousness   Level of Consciousness Alert   Respiratory WDL   Respiratory (WDL) X   Chest Exam   Respiration 18   Pulse 81   Breath Sounds   RUL Breath Sounds Clear   RML Breath Sounds Clear   RLL Breath Sounds Diminished   TRISTAN Breath Sounds Clear   LLL Breath Sounds Diminished   Oximetry   #Pulse Oximetry (Single Determination) Yes   Oxygen   Pulse Oximetry 95 %   O2 Daily Delivery Respiratory  Room Air with O2 Available   Protocol Pathways   Protocol Pathways Yes   Bronchodilator Protocol   Med Order With RCP No   Is this an exacerbation of COPD/Asthma? Patient on Home Regimen   Bronchodilator Indications History / Diagnosis   Breath Sounds Criteria 0    Benefit Criteria 0    Pulse Criteria 0    Respiratory Rate Criteria 1    S.O.B. Criteria 1    Criteria Total 2   Point Values 0-3 - PRN

## 2020-01-25 NOTE — FLOWSHEET NOTE
01/25/20 1402   Interdisciplinary Plan of Care-Goals (Indications)   Bronchodilator Indications History / Diagnosis   Interdisciplinary Plan of Care-Outcomes    Bronchodilator Outcome Patient at Stable Baseline   Education   Education Yes - Pt. / Family has been Instructed in use of Respiratory Equipment;Yes - Pt. / Family has been Instructed in use of Respiratory Medications and Adverse Reactions   RT Assessment of Delivered Medications   Evaluation of Medication Delivery Daily Yes-- Pt /Family has been Instructed in use of Respiratory Medications and Adverse Reactions   SVN Group   #SVN Performed Yes   Given By: Mouthpiece   Date SVN Next Change Due (Q 7 Days) 01/31/20   Chest Exam   Respiration 20   Pulse 64   Breath Sounds   RUL Breath Sounds Clear   RML Breath Sounds Clear   RLL Breath Sounds Clear   TRISTAN Breath Sounds Clear   LLL Breath Sounds Clear   Secretions   Cough Tight   How Sputum Obtained Spontaneous   Oxygen   O2 Daily Delivery Respiratory  Room Air with O2 Available   Room Air Challenge Pass

## 2020-01-25 NOTE — ED NOTES
1820 Hospitalist in to evaluate patient for further treatment.  1830 Admitting orders received. Waiting for bed assignment.

## 2020-01-25 NOTE — CARE PLAN
Problem: Safety  Goal: Will remain free from injury  Outcome: PROGRESSING AS EXPECTED     Problem: Venous Thromboembolism (VTW)/Deep Vein Thrombosis (DVT) Prevention:  Goal: Patient will participate in Venous Thrombosis (VTE)/Deep Vein Thrombosis (DVT)Prevention Measures  Outcome: PROGRESSING AS EXPECTED     Problem: Respiratory:  Goal: Respiratory status will improve  Outcome: PROGRESSING AS EXPECTED     Problem: Fluid Volume:  Goal: Will maintain balanced intake and output  Outcome: PROGRESSING AS EXPECTED

## 2020-01-25 NOTE — PROGRESS NOTES
Pt arrives to unit from ER via bed. Pt A&Ox4, resting comfortably in bed knitting a blanket. Voice is hoarse, pt reports shortness of breath is improved, maintaining O2 saturations >90% on room air. Pt oriented to unit and plan of care discussed, including diet, medications, labs, and need for sputum sample. Admission profile completed. Pt encouraged to ring call light for any questions or concerns.

## 2020-01-25 NOTE — FLOWSHEET NOTE
01/25/20 0700   Interdisciplinary Plan of Care-Goals (Indications)   Bronchodilator Indications History / Diagnosis   Interdisciplinary Plan of Care-Outcomes    Bronchodilator Outcome Patient at Stable Baseline   Education   Education Yes - Pt. / Family has been Instructed in use of Respiratory Equipment;Yes - Pt. / Family has been Instructed in use of Respiratory Medications and Adverse Reactions   RT Assessment of Delivered Medications   Evaluation of Medication Delivery Daily Yes-- Pt /Family has been Instructed in use of Respiratory Medications and Adverse Reactions   SVN Group   #SVN Performed Yes   Given By: Mouthpiece   Date SVN Last Changed 01/24/20   Date SVN Next Change Due (Q 7 Days) 01/31/20   Chest Exam   Respiration 20   Pulse 78   Breath Sounds   RUL Breath Sounds Clear   RML Breath Sounds Clear   RLL Breath Sounds Diminished   TRISTAN Breath Sounds Clear   LLL Breath Sounds Diminished   Secretions   Cough Tight   How Sputum Obtained Spontaneous   Oximetry   #Pulse Oximetry (Single Determination) Yes   Oxygen   Pulse Oximetry 95 %   O2 (LPM) 0   O2 Daily Delivery Respiratory  Room Air with O2 Available

## 2020-01-25 NOTE — ED NOTES
Assumed care of patient, awaiting bed assignment.  Patient given update on bed status, will continue to monitor.

## 2020-01-26 VITALS
TEMPERATURE: 98.1 F | RESPIRATION RATE: 20 BRPM | WEIGHT: 187.83 LBS | OXYGEN SATURATION: 96 % | SYSTOLIC BLOOD PRESSURE: 149 MMHG | HEART RATE: 68 BPM | DIASTOLIC BLOOD PRESSURE: 88 MMHG | BODY MASS INDEX: 32.07 KG/M2 | HEIGHT: 64 IN

## 2020-01-26 LAB
CALCIUM 24H UR-MCNC: 6.7 MG/DL
CALCIUM 24H UR-MRATE: NORMAL MG/D
CALCIUM/CREAT 24H UR: 63 MG/G (ref 20–300)
COLLECT DURATION TIME SPEC: NORMAL HRS
COLLECT DURATION TIME SPEC: NORMAL HRS
CREAT 24H UR-MCNC: 106 MG/DL
CREAT 24H UR-MRATE: NORMAL MG/D (ref 500–1400)
PHOSPHATE 24H UR-MCNC: 36 MG/DL
PHOSPHATE 24H UR-MRATE: NORMAL MG/D (ref 400–1300)
PHOSPHATE/CREAT 24H UR: 340 MG/G
SPECIMEN VOL ?TM UR: NORMAL ML
TOTAL VOLUME 1105: NORMAL ML

## 2020-01-26 PROCEDURE — 99217 PR OBSERVATION CARE DISCHARGE: CPT | Performed by: INTERNAL MEDICINE

## 2020-01-26 PROCEDURE — A9270 NON-COVERED ITEM OR SERVICE: HCPCS | Performed by: INTERNAL MEDICINE

## 2020-01-26 PROCEDURE — 700102 HCHG RX REV CODE 250 W/ 637 OVERRIDE(OP): Performed by: INTERNAL MEDICINE

## 2020-01-26 PROCEDURE — G0378 HOSPITAL OBSERVATION PER HR: HCPCS

## 2020-01-26 PROCEDURE — 700101 HCHG RX REV CODE 250: Performed by: INTERNAL MEDICINE

## 2020-01-26 PROCEDURE — 94640 AIRWAY INHALATION TREATMENT: CPT

## 2020-01-26 RX ORDER — LIOTHYRONINE SODIUM 25 UG/1
25 TABLET ORAL
Status: DISCONTINUED | OUTPATIENT
Start: 2020-01-26 | End: 2020-01-26 | Stop reason: HOSPADM

## 2020-01-26 RX ADMIN — LEVOTHYROXINE SODIUM 75 MCG: 50 TABLET ORAL at 05:40

## 2020-01-26 RX ADMIN — BENZONATATE 100 MG: 100 CAPSULE ORAL at 05:39

## 2020-01-26 RX ADMIN — COLESEVELAM HYDROCHLORIDE 625 MG: 625 TABLET, FILM COATED ORAL at 05:42

## 2020-01-26 RX ADMIN — ESTRADIOL 0.5 MG: 1 TABLET ORAL at 05:41

## 2020-01-26 RX ADMIN — SPIRONOLACTONE 25 MG: 25 TABLET ORAL at 05:41

## 2020-01-26 RX ADMIN — CARVEDILOL 25 MG: 6.25 TABLET, FILM COATED ORAL at 05:39

## 2020-01-26 RX ADMIN — ALBUTEROL SULFATE 2.5 MG: 2.5 SOLUTION RESPIRATORY (INHALATION) at 06:36

## 2020-01-26 RX ADMIN — MELATONIN 2000 UNITS: at 05:40

## 2020-01-26 RX ADMIN — GABAPENTIN 400 MG: 400 CAPSULE ORAL at 05:40

## 2020-01-26 RX ADMIN — LOSARTAN POTASSIUM 100 MG: 25 TABLET ORAL at 05:39

## 2020-01-26 RX ADMIN — GUAIFENESIN AND DEXTROMETHORPHAN 10 ML: 100; 10 SYRUP ORAL at 03:02

## 2020-01-26 RX ADMIN — LIOTHYRONINE SODIUM 25 MCG: 25 TABLET ORAL at 09:19

## 2020-01-26 RX ADMIN — FUROSEMIDE 40 MG: 40 TABLET ORAL at 05:41

## 2020-01-26 NOTE — FLOWSHEET NOTE
01/26/20 0600   Events/Summary/Plan   Non-Invasive Resp Device Site Inspection Completed Intact   Interdisciplinary Plan of Care-Goals (Indications)   Bronchodilator Indications History / Diagnosis   Interdisciplinary Plan of Care-Outcomes    Bronchodilator Outcome Patient at Stable Baseline   Education   Education Yes - Pt. / Family has been Instructed in use of Respiratory Equipment;Yes - Pt. / Family has been Instructed in use of Respiratory Medications and Adverse Reactions   RT Assessment of Delivered Medications   Evaluation of Medication Delivery Daily Yes-- Pt /Family has been Instructed in use of Respiratory Medications and Adverse Reactions   SVN Group   #SVN Performed Yes   Given By: Mouthpiece   Date SVN Next Change Due (Q 7 Days) 01/31/20   Chest Exam   Work Of Breathing / Effort Mild   Respiration 20   Pulse 68   Breath Sounds   RUL Breath Sounds Clear   RML Breath Sounds Clear   RLL Breath Sounds Diminished;Fine Crackles   TRISTAN Breath Sounds Clear   LLL Breath Sounds Diminished;Fine Crackles   Secretions   Cough Congested   How Sputum Obtained Spontaneous   Oxygen   Pulse Oximetry 96 %   O2 (LPM) 0   O2 Daily Delivery Respiratory  Room Air with O2 Available

## 2020-01-26 NOTE — DISCHARGE INSTRUCTIONS
Discharge Instructions    Discharged to home by car with relative. Discharged via ambulance, hospital escort: Yes.  Special equipment needed: Not Applicable    Be sure to schedule a follow-up appointment with your primary care doctor or any specialists as instructed.     Discharge Plan:   Diet Plan: Discussed  Activity Level: Discussed  Confirmed Follow up Appointment: Patient to Call and Schedule Appointment  Confirmed Symptoms Management: Discussed  Medication Reconciliation Updated: Yes  Influenza Vaccine Indication: Not indicated: Previously immunized this influenza season and > 8 years of age    I understand that a diet low in cholesterol, fat, and sodium is recommended for good health. Unless I have been given specific instructions below for another diet, I accept this instruction as my diet prescription.   Other diet: Cardiac    Special Instructions: None    · Is patient discharged on Warfarin / Coumadin?   No     Depression / Suicide Risk    As you are discharged from this [facility] facility, it is important to learn how to keep safe from harming yourself.    Recognize the warning signs:  · Abrupt changes in personality, positive or negative- including increase in energy   · Giving away possessions  · Change in eating patterns- significant weight changes-  positive or negative  · Change in sleeping patterns- unable to sleep or sleeping all the time   · Unwillingness or inability to communicate  · Depression  · Unusual sadness, discouragement and loneliness  · Talk of wanting to die  · Neglect of personal appearance   · Rebelliousness- reckless behavior  · Withdrawal from people/activities they love  · Confusion- inability to concentrate     If you or a loved one observes any of these behaviors or has concerns about self-harm, here's what you can do:  · Talk about it- your feelings and reasons for harming yourself  · Remove any means that you might use to hurt yourself (examples: pills, rope, extension  cords, firearm)  · Get professional help from the community (Mental Health, Substance Abuse, psychological counseling)  · Do not be alone:Call your Safe Contact- someone whom you trust who will be there for you.  · Call your local CRISIS HOTLINE 386-3208 or 844-616-8166  · Call your local Children's Mobile Crisis Response Team [place] (411) 280-7302 or www.[employer]  · Call the toll free National Suicide Prevention Hotlines   · National Suicide Prevention Lifeline 935-585-GPJA (8187)  · National Hope Line Network 800-SUICIDE (015-6319)

## 2020-01-26 NOTE — CARE PLAN
Problem: Safety  Goal: Will remain free from injury  Outcome: PROGRESSING AS EXPECTED  Goal: Will remain free from falls  Outcome: PROGRESSING AS EXPECTED     Problem: Infection  Goal: Will remain free from infection  Outcome: PROGRESSING AS EXPECTED     Problem: Knowledge Deficit  Goal: Knowledge of disease process/condition, treatment plan, diagnostic tests, and medications will improve  Outcome: PROGRESSING AS EXPECTED     Problem: Respiratory:  Goal: Respiratory status will improve  Outcome: PROGRESSING AS EXPECTED

## 2020-01-26 NOTE — PROGRESS NOTES
Pt discharged home in stable conditions, VSS, voided prior DC, all belongings taken, was escorted by staff to main exit. DC instructions and education given. Pt verbalizes understanding

## 2020-01-26 NOTE — DISCHARGE SUMMARY
Discharge Summary    CHIEF COMPLAINT ON ADMISSION  Chief Complaint   Patient presents with   • Shortness of Breath     continues to have sob  was admitted here 1/21 and d/c'ed the 1/22   • Weight Gain     states she has gained 10 pds on her scaled (the last 3 days)   • Headache   • Chest Pain     feels diffenent than the soreness she has w/ coughing       Reason for Admission  Shortness Of Breath; Weight Gain     Admission Date  1/24/2020    CODE STATUS  Full Code    HPI & HOSPITAL COURSE  This is a 54 y.o. female here with Past medical history of obesity presented with complaint ofShortness of breath and fluid overload.  Patient was recently discharged home with steroid bronchodilator after URI and asthma exacerbation however patient came back with signs of fluid overload according to patient.  Patient was put RT, bronchodilator, and supportive care and patient symptom quickly improved.  After close monitoring the hospital patient has been stable and patient's condition back to baseline.    Therefore, she is discharged in fair and stable condition to home with close outpatient follow-up.    The patient met 2-midnight criteria for an inpatient stay at the time of discharge.    Discharge Date  1/26/2020    FOLLOW UP ITEMS POST DISCHARGE  none    DISCHARGE DIAGNOSES      Chronic systolic heart failure (HCC) POA: Yes    Gastroesophageal reflux disease without esophagitis POA: Yes    Chronic sinusitis POA: Yes    Normocytic anemia POA: Yes    FOLLOW UP  Future Appointments   Date Time Provider Department Center   2/18/2020 10:40 AM JASPER Del Toro [facility] None   3/10/2020  9:40 AM Michael J Bloch, M.D. [facility] None   4/21/2020 10:00 AM Adriana Carlson M.D. [facility] None     Madisyn Mccullough M.D.  [address] [address]  [phone]            MEDICATIONS ON DISCHARGE     Medication List      CHANGE how you take these medications      Instructions   predniSONE 20 MG Tabs  What changed:   additional instructions  Commonly known as:  DELTASONE   Take 2 Tabs by mouth every day for 4 days.  Dose:  40 mg        CONTINUE taking these medications      Instructions   acetaminophen 325 MG Tabs  Commonly known as:  TYLENOL   Take 650 mg by mouth every four hours as needed for Moderate Pain.  Dose:  650 mg     AIMOVIG 70 MG/ML Soaj  Generic drug:  Erenumab   1 mL by Injection route Q30 DAYS.  Dose:  1 mL     albuterol 108 (90 Base) MCG/ACT Aers inhalation aerosol   Inhale 2 Puffs by mouth every 6 hours as needed for Shortness of Breath.  Dose:  2 Puff     B-12 1000 MCG/ML Kit   1,000 mcg by Injection route every 7 days. On Wednesday  Dose:  1,000 mcg     Biotin 5000 MCG Caps   Take 5,000 mcg by mouth every day.  Dose:  5,000 mcg     calcitonin (salmon) 200 UNIT/ACT Soln  Commonly known as:  MIACALCIN   Spray 1 Spray in nose every bedtime.  Dose:  1 Spray     CALCIUM-MAGNESIUM-ZINC PO   Take 1 Tab by mouth every day.  Dose:  1 Tab     carvedilol 25 MG Tabs  Commonly known as:  COREG   Take 25 mg by mouth 2 times a day, with meals.  Dose:  25 mg     cevimeline 30 MG capsule  Commonly known as:  EVOXAC   Take 1 Cap by mouth 3 times a day.  Dose:  30 mg     colesevelam 625 MG Tabs  Commonly known as:  WELCHOL   Take 625 mg by mouth 3 times a day, with meals.  Dose:  625 mg     DEXILANT 60 MG Cpdr delayed-release capsule  Generic drug:  Dexlansoprazole   Take 60 mg by mouth every day.  Dose:  60 mg     DULoxetine 60 MG Cpep delayed-release capsule  Commonly known as:  CYMBALTA   Take 60 mg by mouth every evening.  Dose:  60 mg     EPINEPHrine 0.3 MG/0.3ML Soaj solution for injection  Commonly known as:  EPIPEN   0.3 mg by Intramuscular route Once. Indications: Life-Threatening Hypersensitivity Reaction  Dose:  0.3 mg     estradiol 0.5 MG tablet  Commonly known as:  ESTRACE   Take 0.5 mg by mouth every day.  Dose:  0.5 mg     famotidine 40 MG Tabs  Commonly known as:  PEPCID   Take 40 mg by mouth every  bedtime.  Dose:  40 mg     FARXIGA 5 MG Tabs  Generic drug:  Dapagliflozin Propanediol   Take 1 Tab by mouth every day.  Dose:  1 Tab     FROVA 2.5 MG Tabs  Generic drug:  Frovatriptan Succinate   Take 2.5 mg by mouth as needed (For migraines).  Dose:  2.5 mg     furosemide 20 MG Tabs  Commonly known as:  LASIX   TAKE 0.5 TABS BY MOUTH 2 TIMES A DAY.  Dose:  10 mg     gabapentin 400 MG Caps  Commonly known as:  NEURONTIN   Take 1 Cap by mouth 3 times a day.  Dose:  400 mg     KLS ALLER-HAWA 10 MG Tabs  Generic drug:  cetirizine   Take 20 mg by mouth 2 times a day.  Dose:  20 mg     liothyronine 25 MCG Tabs  Commonly known as:  CYTOMEL   Take 25 mcg by mouth every day.  Dose:  25 mcg     losartan 100 MG Tabs  Commonly known as:  COZAAR   Take 1 Tab by mouth every day.  Dose:  100 mg     magnesium oxide 400 MG Tabs  Commonly known as:  MAG-OX   Take 400 mg by mouth every evening.  Dose:  400 mg     MOBIC 15 MG tablet  Generic drug:  meloxicam   Take 15 mg by mouth every evening.  Dose:  15 mg     montelukast 10 MG Tabs  Commonly known as:  SINGULAIR   Take 10 mg by mouth every evening.  Dose:  10 mg     multivitamin Tabs   Take 1 Tab by mouth every day.  Dose:  1 Tab     pantoprazole 40 MG Tbec  Commonly known as:  PROTONIX   Take 40 mg by mouth every evening.  Dose:  40 mg     potassium chloride SA 20 MEQ Tbcr  Commonly known as:  Kdur   Take 1 Tab by mouth every day.  Dose:  20 mEq     PROBIOTIC PO   Take 1 Cap by mouth every day.  Dose:  1 Cap     Somatropin 10 MG Solr   Inject 10 mg as instructed every bedtime.  Dose:  10 mg     spironolactone 25 MG Tabs  Commonly known as:  ALDACTONE   Take 25 mg by mouth every day.  Dose:  25 mg     SYNTHROID 75 MCG Tabs  Generic drug:  levothyroxine   Take 75 mcg by mouth Every morning on an empty stomach.  Dose:  75 mcg     tizanidine 4 MG Tabs  Commonly known as:  ZANAFLEX   Take 2-4 mg by mouth every 6 hours as needed. Indications: Muscle Spasticity  Dose:  2-4 mg     Vitamin  "D 2000 units Caps   Take 2,000 Units by mouth every day.  Dose:  2,000 Units     Vitamin K2 100 MCG Caps   Take 1 Tab by mouth every day.  Dose:  1 Tab            Allergies  Allergies   Allergen Reactions   • Bactrim [Sulfamethoxazole W-Trimethoprim] Shortness of Breath   • Bee Venom Anaphylaxis   • Buprenorphine Anaphylaxis   • Doxycycline Hives and Shortness of Breath   • Econazole Anaphylaxis   • Floxin [Ofloxacin] Anaphylaxis, Shortness of Breath and Swelling     Cause throat swelling and difficulty breathing   • Gadolinium Derivatives Hives and Swelling     Throat swelling   • Iodine Shortness of Breath and Anaphylaxis     Throat and tongue swelling with IV contrast   • Keflex Shortness of Breath   • Levaquin Shortness of Breath     anaphlyaxis   • Levofloxacin Shortness of Breath   • Morphine Anaphylaxis   • Naloxone Hives     \"hives, SOB\"   • Nitrofurantoin Shortness of Breath     ...and hives     • Norco [Apap-Fd&C Yellow #10 Al Tai-Hydrocodone] Shortness of Breath     ...and hives     • Oxycodone Shortness of Breath     ...and hives     • Penicillins Shortness of Breath     ...and hives     • Tape Contact Dermatitis     Blisters, clear tegaderm ok.. No steristrips.   • Ancef [Cefazolin] Hives   • Bextra [Valdecoxib] Rash     \"Skin burn and peeling.\"   • Flagyl [Kdc:Metronidazole+Tartrazine] Hives   • Linezolid Rash     Rash all over body   • Other Drug Hives     \"Enconazole nitrate.\" shortness of breath and hives   • Other Misc Unspecified     Adhesive tape: blisters, skin peels off   • Clindamycin      HIVES     • Tygacil [Tigecycline]      itching   • Zithromax [Azithromycin] Hives and Shortness of Breath     Pt had Hives also       DIET  Orders Placed This Encounter   Procedures   • Diet Order Regular     Standing Status:   Standing     Number of Occurrences:   1     Order Specific Question:   Diet:     Answer:   Regular [1]       ACTIVITY  As tolerated.  Weight bearing as " tolerated    CONSULTATIONS  none    PROCEDURES  None    CT-CTA CHEST PULMONARY ARTERY W/ RECONS   Final Result         1.  No evidence of pulmonary embolism.   2.  Stable 4.1 cm ascending thoracic aortic aneurysm.   3.  Right lower lobe atelectasis. No significant consolidation.            DX-CHEST-PORTABLE (1 VIEW)   Final Result      Negative single view of the chest.          PE:  Gen: AAOx3, NAD  Eyes: PELLA  Neck: no JVD, no lymphadenopathy  Cardia: RRR, no mrg  Lungs: CTAB, no rales, rhonci or wheezing  Abd: NABS, soft, non extended, no mass  EXT: No C/C/E, peripheral pulse 2+ b/l  Neuro: CN II-XII intact, non focal, reflex 2+ symmetrical  Skin: Intact, no lesion, warm  Psych: Appropriate.      LABORATORY  Lab Results   Component Value Date    SODIUM 133 (L) 01/25/2020    POTASSIUM 4.1 01/25/2020    CHLORIDE 96 01/25/2020    CO2 23 01/25/2020    GLUCOSE 134 (H) 01/25/2020    BUN 17 01/25/2020    CREATININE 0.92 01/25/2020        Lab Results   Component Value Date    WBC 7.9 01/25/2020    HEMOGLOBIN 12.0 01/25/2020    HEMATOCRIT 37.6 01/25/2020    PLATELETCT 344 01/25/2020        Total time of the discharge process exceeds 38 minutes.

## 2020-01-27 LAB
BACTERIA SPEC RESP CULT: ABNORMAL
BACTERIA SPEC RESP CULT: ABNORMAL
GRAM STN SPEC: ABNORMAL
SIGNIFICANT IND 70042: ABNORMAL
SITE SITE: ABNORMAL
SOURCE SOURCE: ABNORMAL

## 2020-01-30 LAB — FGF-23 PLAS-ACNC: 103 RU/ML

## 2020-02-08 NOTE — PROGRESS NOTES
Med rec updated and complete  Allergies reviewed  Pt reports no antibiotics in the last 2 weeks   Problem: Falls - Risk of:  Goal: Will remain free from falls  Description  Will remain free from falls  2/8/2020 0007 by Sherine Peterson RN  Outcome: Ongoing  2/7/2020 1159 by Shellie Claude, RN  Outcome: Ongoing  Goal: Absence of physical injury  Description  Absence of physical injury  2/8/2020 0007 by Sherine Peterson RN  Outcome: Ongoing  2/7/2020 1159 by Shellie Claude, RN  Outcome: Ongoing     Problem: Breathing Pattern - Ineffective:  Goal: Ability to achieve and maintain a regular respiratory rate will improve  Description  Ability to achieve and maintain a regular respiratory rate will improve  2/8/2020 0007 by Sherine Peterson RN  Outcome: Ongoing  2/7/2020 1159 by Shellie Claude, RN  Outcome: Ongoing     Problem: Pain:  Goal: Pain level will decrease  Description  Pain level will decrease  2/8/2020 0007 by Sherine Peterson RN  Outcome: Ongoing  2/7/2020 1159 by Shellie Claude, RN  Outcome: Ongoing  Goal: Control of acute pain  Description  Control of acute pain  2/8/2020 0007 by Sherine Peterson RN  Outcome: Ongoing  2/7/2020 1159 by Shellie Claude, RN  Outcome: Ongoing  Goal: Control of chronic pain  Description  Control of chronic pain  2/8/2020 0007 by Sherine Peterson RN  Outcome: Ongoing  2/7/2020 1159 by Shellie Claude, RN  Outcome: Ongoing     Problem: Discharge Planning:  Goal: Discharged to appropriate level of care  Description  Discharged to appropriate level of care  2/8/2020 0007 by Sherine Peterson RN  Outcome: Ongoing  2/7/2020 1159 by Shellie Claude, RN  Outcome: Ongoing     Problem:  Activity Intolerance:  Goal: Ability to tolerate increased activity will improve  Description  Ability to tolerate increased activity will improve  2/8/2020 0007 by Sherine Peterson RN  Outcome: Ongoing  2/7/2020 1159 by Shellie Claude, RN  Outcome: Ongoing     Problem: Airway Clearance - Ineffective:  Goal: Ability to maintain a clear airway will improve  Description  Ability to maintain a clear airway will improve  2/8/2020 0007 by Teri Moreau RN  Outcome: Ongoing  2/7/2020 1159 by Tyson Alarcon RN  Outcome: Ongoing     Problem: Breathing Pattern - Ineffective:  Goal: Ability to achieve and maintain a regular respiratory rate will improve  Description  Ability to achieve and maintain a regular respiratory rate will improve  2/8/2020 0007 by Teri Moreau RN  Outcome: Ongoing  2/7/2020 1159 by Tyson Alarcon RN  Outcome: Ongoing     Problem: Gas Exchange - Impaired:  Goal: Levels of oxygenation will improve  Description  Levels of oxygenation will improve  2/8/2020 0007 by Teri Moreau RN  Outcome: Ongoing  2/7/2020 1159 by Tyson Alarcon RN  Outcome: Ongoing

## 2020-02-18 ENCOUNTER — OFFICE VISIT (OUTPATIENT)
Dept: NEUROLOGY | Facility: MEDICAL CENTER | Age: 55
End: 2020-02-18
Payer: MEDICARE

## 2020-02-18 VITALS
OXYGEN SATURATION: 98 % | RESPIRATION RATE: 16 BRPM | DIASTOLIC BLOOD PRESSURE: 74 MMHG | HEART RATE: 85 BPM | TEMPERATURE: 98.1 F | SYSTOLIC BLOOD PRESSURE: 128 MMHG | HEIGHT: 64 IN | WEIGHT: 182 LBS | BODY MASS INDEX: 31.07 KG/M2

## 2020-02-18 PROCEDURE — 64615 CHEMODENERV MUSC MIGRAINE: CPT | Performed by: NURSE PRACTITIONER

## 2020-02-18 NOTE — PROGRESS NOTES
Subjective:      Donna Isaac is a 54 y.o. female who presents with Botox Injection (Intractable chronic migraine without aura and without status migrainosus)            HPI  Here for Botox #2 treatment implemented per Dr Elmore.    Anticipating right frontal sinus surgery in March per [facility].    Was last in the hospital last month for asthma exacerbation.  Needs a cardiology surgical release-- has unstable blood pressure.      Current Outpatient Medications   Medication Sig Dispense Refill   • FARXIGA 5 MG Tab Take 1 Tab by mouth every day.     • albuterol 108 (90 Base) MCG/ACT Aero Soln inhalation aerosol Inhale 2 Puffs by mouth every 6 hours as needed for Shortness of Breath. 8.5 g 0   • Dexlansoprazole (DEXILANT) 60 MG CAPSULE DELAYED RELEASE delayed-release capsule Take 60 mg by mouth every day.     • acetaminophen (TYLENOL) 325 MG Tab Take 650 mg by mouth every four hours as needed for Moderate Pain.     • famotidine (PEPCID) 40 MG Tab Take 40 mg by mouth every bedtime.     • Probiotic Product (PROBIOTIC PO) Take 1 Cap by mouth every day.     • CALCIUM-MAGNESIUM-ZINC PO Take 1 Tab by mouth every day.     • carvedilol (COREG) 25 MG Tab Take 25 mg by mouth 2 times a day, with meals.     • Somatropin 10 MG Recon Soln Inject 10 mg as instructed every bedtime.     • losartan (COZAAR) 100 MG Tab Take 1 Tab by mouth every day. 30 Tab 2   • spironolactone (ALDACTONE) 25 MG Tab Take 25 mg by mouth every day.     • DULoxetine (CYMBALTA) 60 MG Cap DR Particles delayed-release capsule Take 60 mg by mouth every evening.     • Frovatriptan Succinate (FROVA) 2.5 MG Tab Take 2.5 mg by mouth as needed (For migraines).     • montelukast (SINGULAIR) 10 MG Tab Take 10 mg by mouth every evening.     • pantoprazole (PROTONIX) 40 MG Tablet Delayed Response Take 40 mg by mouth every evening.     • tizanidine (ZANAFLEX) 4 MG Tab Take 2-4 mg by mouth every 6 hours as needed. Indications: Muscle Spasticity     • calcitonin,  salmon, (MIACALCIN) 200 UNIT/ACT Solution Spray 1 Spray in nose every bedtime.  12   • meloxicam (MOBIC) 15 MG tablet Take 15 mg by mouth every evening.     • furosemide (LASIX) 20 MG Tab TAKE 0.5 TABS BY MOUTH 2 TIMES A DAY. 90 Tab 1   • Cyanocobalamin (B-12) 1000 MCG/ML Kit 1,000 mcg by Injection route every 7 days. On Wednesday     • colesevelam (WELCHOL) 625 MG Tab Take 625 mg by mouth 3 times a day, with meals.     • EPINEPHrine (EPIPEN) 0.3 MG/0.3ML Solution Auto-injector solution for injection 0.3 mg by Intramuscular route Once. Indications: Life-Threatening Hypersensitivity Reaction     • cevimeline (EVOXAC) 30 MG capsule Take 1 Cap by mouth 3 times a day. 270 Cap 0   • cetirizine (KLS ALLER-HAWA) 10 MG Tab Take 20 mg by mouth 2 times a day.     • liothyronine (CYTOMEL) 25 MCG Tab Take 25 mcg by mouth every day.     • AIMOVIG 70 MG/ML Solution Auto-injector 1 mL by Injection route Q30 DAYS.     • estradiol (ESTRACE) 0.5 MG tablet Take 0.5 mg by mouth every day.     • SYNTHROID 75 MCG Tab Take 75 mcg by mouth Every morning on an empty stomach.     • potassium chloride SA (KDUR) 20 MEQ Tab CR Take 1 Tab by mouth every day. 30 Tab 1   • gabapentin (NEURONTIN) 400 MG Cap Take 1 Cap by mouth 3 times a day. 180 Cap 3   • magnesium oxide (MAG-OX) 400 MG Tab Take 400 mg by mouth every evening.     • Biotin 5000 MCG Cap Take 5,000 mcg by mouth every day.     • Cholecalciferol (VITAMIN D) 2000 units Cap Take 2,000 Units by mouth every day.     • Menaquinone-7 (VITAMIN K2) 100 MCG Cap Take 1 Tab by mouth every day.     • multivitamin (THERAGRAN) Tab Take 1 Tab by mouth every day.       Current Facility-Administered Medications   Medication Dose Route Frequency Provider Last Rate Last Dose   • onabotulinum toxin type A SOLR 155 Units  155 Units Injection Once KONSTANTIN Del ToroPCHIKI WALKER       Objective:     /74 (BP Location: Right arm, Patient Position: Sitting, BP Cuff Size: Adult)   Pulse 85   Temp  "36.7 °C (98.1 °F) (Temporal)   Resp 16   Ht 1.626 m (5' 4.02\")   Wt 82.6 kg (182 lb)   LMP 02/07/2016 (Within Months)   SpO2 98%   BMI 31.22 kg/m²      Physical Exam            Assessment/Plan:     Chronic Migraine:  Botox therapy has reduced patient’s migraines by more than 7 days and/or 100 hours per month.  Additionally pt has noted a reduction in the amount of medication they are taking to treat their migraines and/or they are having a reduction in intractable migraine/status migrainosis which can result in urgent care or ER visits.     Documentation of patient's symptoms with migraine are included in the initial note with this patient including the following:  Education/counseling/reduction in medications if pt has medication overuse headache;  Frequency of headaches is >15 days monthly with at least 8 migraines monthly;  Migraines include at least two of the following: worsened with activity or avoidance of activity with migraines (ie they go lie down), moderate to severe pain intensity, pulsing headache, unilateral headache AND they have either nausea or vomiting OR sensitivity to light and sound     Although this patient is responding to botox they are NOT migraine free, however, and it is my recommendation botox be continued at this time     I treated this patient in clinic today with BotoxA 155 units according to the dosing/injection paradigm currently mandated by the FDA for the management of chronic migraine. Specifically, I injected 5 units to the procerus, 5 units to the corrugators bilaterally, a total of 20 units to the frontalis musculature, 20 units to the temporalis bilaterally, 15 units to the occipitalis bilaterally, 10 units to the cervical paraspinals bilaterally and 15 units to the trapezius musculature bilaterally. The remainder of the Botox 200 units mixed but not administered was discarded as wastage per FDA guidelines.      Return for follow-up in 12 weeks for 3rd set of " Botox

## 2020-02-25 ENCOUNTER — HOSPITAL ENCOUNTER (OUTPATIENT)
Dept: LAB | Facility: MEDICAL CENTER | Age: 55
End: 2020-02-25
Attending: INTERNAL MEDICINE
Payer: MEDICARE

## 2020-02-25 LAB
ALBUMIN SERPL BCP-MCNC: 4 G/DL (ref 3.2–4.9)
ALBUMIN/GLOB SERPL: 1.5 G/DL
ALP SERPL-CCNC: 89 U/L (ref 30–99)
ALT SERPL-CCNC: 22 U/L (ref 2–50)
ANION GAP SERPL CALC-SCNC: 8 MMOL/L (ref 0–11.9)
AST SERPL-CCNC: 21 U/L (ref 12–45)
BILIRUB SERPL-MCNC: 0.5 MG/DL (ref 0.1–1.5)
BUN SERPL-MCNC: 18 MG/DL (ref 8–22)
CALCIUM SERPL-MCNC: 9.2 MG/DL (ref 8.5–10.5)
CHLORIDE SERPL-SCNC: 103 MMOL/L (ref 96–112)
CO2 SERPL-SCNC: 25 MMOL/L (ref 20–33)
CORTIS SERPL-MCNC: 0.7 UG/DL (ref 0–23)
CREAT SERPL-MCNC: 1.08 MG/DL (ref 0.5–1.4)
CREAT UR-MCNC: 75 MG/DL
GLOBULIN SER CALC-MCNC: 2.7 G/DL (ref 1.9–3.5)
GLUCOSE SERPL-MCNC: 105 MG/DL (ref 65–99)
PHOSPHATE SERPL-MCNC: 3.3 MG/DL (ref 2.5–4.5)
POTASSIUM SERPL-SCNC: 4.3 MMOL/L (ref 3.6–5.5)
POTASSIUM UR-SCNC: 27.8 MMOL/L
PROT SERPL-MCNC: 6.7 G/DL (ref 6–8.2)
SODIUM SERPL-SCNC: 136 MMOL/L (ref 135–145)
SODIUM UR-SCNC: 103 MMOL/L

## 2020-02-25 PROCEDURE — 80053 COMPREHEN METABOLIC PANEL: CPT

## 2020-02-25 PROCEDURE — 84100 ASSAY OF PHOSPHORUS: CPT

## 2020-02-25 PROCEDURE — 82340 ASSAY OF CALCIUM IN URINE: CPT

## 2020-02-25 PROCEDURE — 82533 TOTAL CORTISOL: CPT

## 2020-02-25 PROCEDURE — 84133 ASSAY OF URINE POTASSIUM: CPT

## 2020-02-25 PROCEDURE — 84105 ASSAY OF URINE PHOSPHORUS: CPT

## 2020-02-25 PROCEDURE — 82570 ASSAY OF URINE CREATININE: CPT

## 2020-02-25 PROCEDURE — 36415 COLL VENOUS BLD VENIPUNCTURE: CPT

## 2020-02-25 PROCEDURE — 84300 ASSAY OF URINE SODIUM: CPT

## 2020-02-27 LAB
CALCIUM 24H UR-MCNC: 5.5 MG/DL
CALCIUM 24H UR-MRATE: NORMAL MG/D
CALCIUM/CREAT 24H UR: 74 MG/G (ref 20–300)
COLLECT DURATION TIME SPEC: NORMAL HRS
COLLECT DURATION TIME SPEC: NORMAL HRS
CREAT 24H UR-MCNC: 74 MG/DL
CREAT 24H UR-MRATE: NORMAL MG/D (ref 500–1400)
PHOSPHATE 24H UR-MCNC: 24 MG/DL
PHOSPHATE 24H UR-MRATE: NORMAL MG/D (ref 400–1300)
PHOSPHATE/CREAT 24H UR: 324 MG/G
SPECIMEN VOL ?TM UR: NORMAL ML
TOTAL VOLUME 1105: NORMAL ML

## 2020-03-04 ENCOUNTER — TELEPHONE (OUTPATIENT)
Dept: CARDIOLOGY | Facility: MEDICAL CENTER | Age: 55
End: 2020-03-04

## 2020-03-04 NOTE — TELEPHONE ENCOUNTER
Received fax from [facility] - Department of Otol[facility]ology - Head & Neck Surgery ([phone]) requesting cardiac clearance for upcoming procedure: endoscopic right maxillary antrostomy with tissue removal, right frontal sinusostomy with possible frontal trephination, bilateral total ethmoidectomy with computerized image guidance on 3/17/2020.    Clearance request relayed to MD for advise.

## 2020-03-06 NOTE — TELEPHONE ENCOUNTER
Patient Call   Adriana Carlson M.D.  You 18 hours ago (5:12 PM)      OK to proceed from our standpoint if her BP has been in good range   Did she go to [facility] clinic?    Routing comment      Attempted to call pt, unable to reach.  Left voicemail to return this call at her earliest convenience to discuss MD clarification.      Will await for pt to return this call.

## 2020-03-07 NOTE — TELEPHONE ENCOUNTER
"Returned pt call and reviewed findings.  She states, \"My bp has been doing well.  It's been normal 110-120/70-80.  On some days the bottom number would creep up to 110 but I would take the PRN med and it would take care of it.  I am scheduled to see them ([facility] clinic) next week.\"  Reviewed MD recommendations.  She verbalizes understanding and states no other concerns or questions at this time.    Will draft letter next business day.  "

## 2020-03-09 NOTE — TELEPHONE ENCOUNTER
Letter drafted with MD recommendations and faxed to [facility] - Department of Otol[facility]ology - Head & Neck Surgery, [phone], completed status.    Fax sent to scanning for reference.

## 2020-03-10 ENCOUNTER — OFFICE VISIT (OUTPATIENT)
Dept: VASCULAR LAB | Facility: MEDICAL CENTER | Age: 55
End: 2020-03-10
Attending: INTERNAL MEDICINE
Payer: MEDICARE

## 2020-03-10 VITALS
DIASTOLIC BLOOD PRESSURE: 120 MMHG | HEART RATE: 90 BPM | HEIGHT: 64 IN | WEIGHT: 192.6 LBS | SYSTOLIC BLOOD PRESSURE: 168 MMHG | BODY MASS INDEX: 32.88 KG/M2

## 2020-03-10 DIAGNOSIS — R03.0 WHITE COAT SYNDROME WITH HIGH BLOOD PRESSURE BUT WITHOUT HYPERTENSION: ICD-10-CM

## 2020-03-10 DIAGNOSIS — I10 ESSENTIAL HYPERTENSION: ICD-10-CM

## 2020-03-10 DIAGNOSIS — I51.81 TAKOTSUBO SYNDROME: ICD-10-CM

## 2020-03-10 DIAGNOSIS — E03.9 HYPOTHYROIDISM, UNSPECIFIED TYPE: ICD-10-CM

## 2020-03-10 PROCEDURE — 99214 OFFICE O/P EST MOD 30 MIN: CPT | Performed by: INTERNAL MEDICINE

## 2020-03-10 PROCEDURE — 99212 OFFICE O/P EST SF 10 MIN: CPT

## 2020-03-10 ASSESSMENT — ENCOUNTER SYMPTOMS
PALPITATIONS: 0
BLURRED VISION: 0
SENSORY CHANGE: 0
COUGH: 0
HEADACHES: 1
BACK PAIN: 1
NERVOUS/ANXIOUS: 0
CONSTIPATION: 0
WEIGHT LOSS: 0
SHORTNESS OF BREATH: 0
BLOOD IN STOOL: 0
HEARTBURN: 1
FOCAL WEAKNESS: 0
SPEECH CHANGE: 0
SEIZURES: 0
DEPRESSION: 0
DIZZINESS: 0
DIARRHEA: 0
DOUBLE VISION: 0

## 2020-03-10 ASSESSMENT — FIBROSIS 4 INDEX: FIB4 SCORE: 0.72

## 2020-03-10 NOTE — PROGRESS NOTES
INITIAL VASCULAR VISIT  Subjective:   Donna Isaac is a 54 y.o. female who presents today 3/10/2020 for   Chief Complaint   Patient presents with   • New Patient     HPI:  Patient referred for e/m of htn in settting of takasubo's cm, thyroid disease, and dm.  Had admission in [place] about 3 years ago with chest pain and HF and was diagnosed with stress induced cm with recovery. Had cath with clean coronaries at that time.   Had a recurrence in September 2018 once again with decreased ejection fraction that recovered  And most recently was in the hospital at the end of 2019 with chest pain and signs or symptoms of heart failure.  Was found to have significantly decreased ejection fraction, but once again recovered on echocardiogram  About 2 years began having labile bps - high at times, but also with orthostatic intolerance and syncope.   Blood pressures been difficult to control  Has been on dexamethasone from dr. Quiñonez for 3 weeks.  She feels her blood pressures have been much better since she started on it and she is been having stabilization in her blood pressures at home  By her report most of her blood pressures are less than 130/80  She takes Mobic every day  No other interfering substances  Good adherence with medications  She does have nearly daily headache -has been diagnosed with migraine  Of note she apparently has a large sinus cyst that is going to be removed at [facility] next week  On colsevelam for GI issues -never been told she has high cholesterol    Past Medical History:   Diagnosis Date   • Asthma    • Chronic pain    • Congestive heart failure (HCC)     Cardiologist, Dr. Carlson   • Fibromyalgia    • GERD (gastroesophageal reflux disease)    • Hypertension    • Migraine    • Osteoporosis    • Renal disorder     CKD   • Seizure ([facility])    • TBI (traumatic brain injury) ([facility])    • Urticaria      Past Surgical History:   Procedure Laterality Date   • SHOULDER DECOMPRESSION  ARTHROSCOPIC Left 2/19/2019    Procedure: SHOULDER DECOMPRESSION ARTHROSCOPIC - SUBACROMIAL;  Surgeon: Camila Elkins M.D.;  Location: SURGERY [facility];  Service: Orthopedics   • CLAVICLE DISTAL EXCISION Left 2/19/2019    Procedure: CLAVICLE DISTAL EXCISION;  Surgeon: Camila Elkins M.D.;  Location: SURGERY [facility];  Service: Orthopedics   • SHOULDER ARTHROSCOPY W/ BICIPITAL TENODESIS REPAIR Left 2/19/2019    Procedure: SHOULDER ARTHROSCOPY W/ BICIPITAL TENODESIS REPAIR;  Surgeon: Camila Elkins M.D.;  Location: SURGERY [facility];  Service: Orthopedics   • GASTROSCOPY N/A 2/7/2019    Procedure: GASTROSCOPY- DIALATION AND BIOPSY;  Surgeon: Hola Veliz M.D.;  Location: SURGERY [facility];  Service: Gastroenterology   • ABDOMINAL EXPLORATION  2002   • GASTRIC BYPASS LAPAROSCOPIC  1999   • HYSTERECTOMY, TOTAL ABDOMINAL  1995   • APPENDECTOMY  1974     Family History   Problem Relation Age of Onset   • Heart Disease Mother    • Diabetes Mother    • Heart Failure Mother    • Hypertension Mother    • Hypertension Father    • Heart Disease Brother    • Heart Attack Brother    • Hypertension Brother      Social History     Tobacco Use   Smoking Status Never Smoker   Smokeless Tobacco Never Used     Social History     Tobacco Use   • Smoking status: Never Smoker   • Smokeless tobacco: Never Used   Substance Use Topics   • Alcohol use: Not Currently   • Drug use: No     Outpatient Encounter Medications as of 3/10/2020   Medication Sig Dispense Refill   • albuterol 108 (90 Base) MCG/ACT Aero Soln inhalation aerosol Inhale 2 Puffs by mouth every 6 hours as needed for Shortness of Breath. 8.5 g 0   • Dexlansoprazole (DEXILANT) 60 MG CAPSULE DELAYED RELEASE delayed-release capsule Take 60 mg by mouth every day.     • acetaminophen (TYLENOL) 325 MG Tab Take 650 mg by mouth every four hours as needed for Moderate Pain.     • famotidine (PEPCID) 40 MG Tab Take 40 mg by mouth every  bedtime.     • Probiotic Product (PROBIOTIC PO) Take 1 Cap by mouth every day.     • CALCIUM-MAGNESIUM-ZINC PO Take 1 Tab by mouth every day.     • carvedilol (COREG) 25 MG Tab Take 25 mg by mouth 2 times a day, with meals.     • Somatropin 10 MG Recon Soln Inject 10 mg as instructed every bedtime.     • losartan (COZAAR) 100 MG Tab Take 1 Tab by mouth every day. 30 Tab 2   • spironolactone (ALDACTONE) 25 MG Tab Take 25 mg by mouth every day.     • DULoxetine (CYMBALTA) 60 MG Cap DR Particles delayed-release capsule Take 60 mg by mouth every evening.     • Frovatriptan Succinate (FROVA) 2.5 MG Tab Take 2.5 mg by mouth as needed (For migraines).     • montelukast (SINGULAIR) 10 MG Tab Take 10 mg by mouth every evening.     • pantoprazole (PROTONIX) 40 MG Tablet Delayed Response Take 40 mg by mouth every evening.     • tizanidine (ZANAFLEX) 4 MG Tab Take 2-4 mg by mouth every 6 hours as needed. Indications: Muscle Spasticity     • calcitonin, salmon, (MIACALCIN) 200 UNIT/ACT Solution Spray 1 Spray in nose every bedtime.  12   • meloxicam (MOBIC) 15 MG tablet Take 15 mg by mouth every evening.     • furosemide (LASIX) 20 MG Tab TAKE 0.5 TABS BY MOUTH 2 TIMES A DAY. 90 Tab 1   • Cyanocobalamin (B-12) 1000 MCG/ML Kit 1,000 mcg by Injection route every 7 days. On Wednesday     • colesevelam (WELCHOL) 625 MG Tab Take 625 mg by mouth 3 times a day, with meals.     • EPINEPHrine (EPIPEN) 0.3 MG/0.3ML Solution Auto-injector solution for injection 0.3 mg by Intramuscular route Once. Indications: Life-Threatening Hypersensitivity Reaction     • cevimeline (EVOXAC) 30 MG capsule Take 1 Cap by mouth 3 times a day. 270 Cap 0   • cetirizine (KLS ALLER-HAWA) 10 MG Tab Take 20 mg by mouth 2 times a day.     • liothyronine (CYTOMEL) 25 MCG Tab Take 25 mcg by mouth every day.     • AIMOVIG 70 MG/ML Solution Auto-injector 1 mL by Injection route Q30 DAYS.     • estradiol (ESTRACE) 0.5 MG tablet Take 0.5 mg by mouth every day.     •  "SYNTHROID 75 MCG Tab Take 75 mcg by mouth Every morning on an empty stomach.     • potassium chloride SA (KDUR) 20 MEQ Tab CR Take 1 Tab by mouth every day. 30 Tab 1   • gabapentin (NEURONTIN) 400 MG Cap Take 1 Cap by mouth 3 times a day. 180 Cap 3   • magnesium oxide (MAG-OX) 400 MG Tab Take 400 mg by mouth every evening.     • Biotin 5000 MCG Cap Take 5,000 mcg by mouth every day.     • Cholecalciferol (VITAMIN D) 2000 units Cap Take 2,000 Units by mouth every day.     • Menaquinone-7 (VITAMIN K2) 100 MCG Cap Take 1 Tab by mouth every day.     • multivitamin (THERAGRAN) Tab Take 1 Tab by mouth every day.     • [DISCONTINUED] FARXIGA 5 MG Tab Take 1 Tab by mouth every day.       No facility-administered encounter medications on file as of 3/10/2020.      Allergies   Allergen Reactions   • Bactrim [Sulfamethoxazole W-Trimethoprim] Shortness of Breath   • Bee Venom Anaphylaxis   • Buprenorphine Anaphylaxis   • Doxycycline Hives and Shortness of Breath   • Econazole Anaphylaxis   • Floxin [Ofloxacin] Anaphylaxis, Shortness of Breath and Swelling     Cause throat swelling and difficulty breathing   • Gadolinium Derivatives Hives and Swelling     Throat swelling   • Iodine Shortness of Breath and Anaphylaxis     Throat and tongue swelling with IV contrast   • Keflex Shortness of Breath   • Levaquin Shortness of Breath     anaphlyaxis   • Levofloxacin Shortness of Breath   • Morphine Anaphylaxis   • Naloxone Hives     \"hives, SOB\"   • Nitrofurantoin Shortness of Breath     ...and hives     • Norco [Apap-Fd&C Yellow #10 Al Tai-Hydrocodone] Shortness of Breath     ...and hives     • Oxycodone Shortness of Breath     ...and hives     • Penicillins Shortness of Breath     ...and hives     • Tape Contact Dermatitis     Blisters, clear tegaderm ok.. No steristrips.   • Ancef [Cefazolin] Hives   • Bextra [Valdecoxib] Rash     \"Skin burn and peeling.\"   • Flagyl [Kdc:Metronidazole+Tartrazine] Hives   • Linezolid Rash     Rash " "all over body   • Other Drug Hives     \"Enconazole nitrate.\" shortness of breath and hives   • Other Misc Unspecified     Adhesive tape: blisters, skin peels off   • Clindamycin      HIVES     • Tygacil [Tigecycline]      itching   • Zithromax [Azithromycin] Hives and Shortness of Breath     Pt had Hives also     DIET AND EXERCISE:  Weight Change: down a few pounds  Diet: common adult  Exercise: minimal exercise     Review of Systems   Constitutional: Negative for malaise/fatigue and weight loss.   Eyes: Negative for blurred vision and double vision.   Respiratory: Negative for cough and shortness of breath.    Cardiovascular: Negative for chest pain, palpitations and leg swelling.   Gastrointestinal: Positive for heartburn. Negative for blood in stool, constipation, diarrhea and melena.   Genitourinary: Negative.    Musculoskeletal: Positive for back pain and joint pain.   Skin: Negative for itching and rash.   Neurological: Positive for headaches. Negative for dizziness, sensory change, speech change, focal weakness and seizures.   Psychiatric/Behavioral: Negative for depression. The patient is not nervous/anxious.       Objective:     Vitals:    03/10/20 0951 03/10/20 0957   BP: (!) 175/120 (!) 168/120   BP Location: Left arm Left arm   Patient Position: Sitting Sitting   BP Cuff Size: Adult Adult   Pulse: 90 90   Weight: 87.4 kg (192 lb 9.6 oz)    Height: 1.62 m (5' 3.78\")       Body mass index is 33.29 kg/m².  Physical Exam  Vitals signs reviewed.   Constitutional:       General: She is not in acute distress.     Appearance: She is well-developed. She is not diaphoretic.   HENT:      Head: Normocephalic and atraumatic.   Eyes:      General: No scleral icterus.     Conjunctiva/sclera: Conjunctivae normal.      Pupils: Pupils are equal, round, and reactive to light.      Comments: Does have some swelling around right eye   Neck:      Musculoskeletal: Normal range of motion and neck supple.      Thyroid: No " thyromegaly.      Vascular: No JVD.   Cardiovascular:      Rate and Rhythm: Normal rate and regular rhythm.      Heart sounds: Normal heart sounds. No murmur. No friction rub. No gallop.    Pulmonary:      Effort: Pulmonary effort is normal. No respiratory distress.      Breath sounds: Normal breath sounds. No wheezing or rales.   Chest:      Chest wall: No tenderness.   Abdominal:      General: Bowel sounds are normal. There is no distension.      Palpations: Abdomen is soft. There is no mass.      Tenderness: There is no abdominal tenderness. There is no guarding or rebound.   Musculoskeletal: Normal range of motion.         General: No tenderness.   Skin:     General: Skin is warm and dry.      Coloration: Skin is not pale.      Findings: No erythema or rash.   Neurological:      Mental Status: She is alert and oriented to person, place, and time.      Cranial Nerves: No cranial nerve deficit.      Coordination: Coordination normal.      Deep Tendon Reflexes: Reflexes are normal and symmetric.       Lab Results   Component Value Date    CHOLSTRLTOT 193 08/26/2019    LDL 65 08/26/2019    HDL 92 08/26/2019    TRIGLYCERIDE 179 (H) 08/26/2019      Lab Results   Component Value Date    PROTHROMBTM 12.3 12/10/2019    INR 0.91 12/10/2019       Lab Results   Component Value Date    HBA1C 6.1 (H) 09/26/2018      Lab Results   Component Value Date    SODIUM 136 02/25/2020    POTASSIUM 4.3 02/25/2020    CHLORIDE 103 02/25/2020    CO2 25 02/25/2020    GLUCOSE 105 (H) 02/25/2020    BUN 18 02/25/2020    CREATININE 1.08 02/25/2020    BUNCREATRAT 11 10/12/2018    IFAFRICA >60 02/25/2020    IFNOTAFR 53 (A) 02/25/2020      Lab Results   Component Value Date    ALDOSTERONE 59.1 12/23/2019      Lab Results   Component Value Date    WBC 7.9 01/25/2020    RBC 4.53 01/25/2020    HEMOGLOBIN 12.0 01/25/2020    HEMATOCRIT 37.6 01/25/2020    MCV 83.0 01/25/2020    MCH 26.5 (L) 01/25/2020    MCHC 31.9 (L) 01/25/2020    MPV 10.6 01/25/2020       Multiple imaging studies available in EMR and reviewed with patient at todays visit     Medical Decision Making:  Today's Assessment / Status / Plan:     1. Takotsubo syndrome     2. Essential hypertension  Basic Metabolic Panel    METANEPHRINES PLASMA    ALDOSTERONE    RENIN ACTIVITY    MICROALBUMIN CREAT RATIO URINE    URINALYSIS    US-RENAL ARTERY DUPLEX COMP   3. Hypothyroidism, unspecified type     4. White coat syndrome with high blood pressure but without hypertension  REFERRAL TO 24-HOUR BLOOD PRESSURE MONITORING     Patient Type: Primary Prevention    Etiology of Established CVD if Present: Recurrent stress-induced cardiomyopathy    Lipid Management: Qualifies for Statin Therapy Based on 2013 ACC/AHA Guidelines: no  Calculated 10-Year Risk of ASCVD: 2  Currently on Statin: No  Clean coronaries on catheterization by her report in the past  No clear indication for statin therapy  -Continue lifestyle modification    Blood Pressure Management:  ACC-AHA blood pressure goal less than 130/80  Reasonably long history of labile blood pressure  Currently elevated in the office with what appears to be decent control at home consistent with possible whitecoat hypertension or white coat resistance  That being said I do think there is a terminal digit preference that suggest that some of her home readings may be inaccurately reported  Given her history of stress-induced cardiomyopathy, I think this likely represents increased sensitivity circulating catecholamines often known as a pseudo-pheochromocytoma syndrome  Secondary cause does need to be ruled out  Subjectively she feels her blood pressure is better since her endocrinologist started her on steroids -but I see no clear evidence of Cushing's   Hopefully her sinus surgery and improvement in her headache syndrome may help her blood pressure  Plan:  -Continue home blood pressure monitoring  -We will obtain 24-hour blood pressure monitor in a few weeks  -Continue  current medications for now  -May want to consider addition of peripheral alpha blocker if her blood pressures remain difficult to control  -Have given her Dr. Frankie Morley's book on pseudo-pheochromocytoma to read  -Check renal artery duplex  -Check renin, aldosterone, plasma free metanephrines, urinalysis and urine for micro albumin  -Defer decisions about whether not to continue dexamethasone to her endocrinologist    Glycemic Status: Normal    Anti-Platelet/Anti-Coagulant Tx: Not currently indicated    Smoking: Continue complete avoidance    Physical Activity: Defer recommendations to her orthopedic surgeon    Weight Management and Nutrition: Dietary plan was discussed with patient at this visit including Mediterranean-style diet low in sodium    Other:     1) stress-induced cardiomyopathy, recurrent -most recent ejection fraction is normal.  No signs or symptoms of heart failure on exam currently.  She has had a normal Holter monitor and cardiac cath by report  -Continue current neurohormonal therapy  -Defer any other further work-up and management to cardiology    2) hypothyroidism -defer management to endocrinology    3) sinus cyst and migraine headache -defer management to ENT and neurology    4) multiple orthopedic issues -defer management to orthopedics    5) fibromyalgia -defer management to neurology and PCP    Instructed to follow-up with PCP for remainder of adult medical needs: yes  We will partner with other providers in the management of established vascular disease and cardiometabolic risk factors.    Studies to Be Obtained:   1) renal artery duplex  2) ambulatory blood pressure monitor    Labs to Be Obtained: As above    Follow up in: 6 to 8 weeks    Michael J Bloch, M.D.     Cc:  DOUGIE Quiñonez

## 2020-03-16 ENCOUNTER — HOSPITAL ENCOUNTER (OUTPATIENT)
Facility: MEDICAL CENTER | Age: 55
End: 2020-03-17
Attending: EMERGENCY MEDICINE | Admitting: INTERNAL MEDICINE
Payer: MEDICARE

## 2020-03-16 ENCOUNTER — APPOINTMENT (OUTPATIENT)
Dept: RADIOLOGY | Facility: MEDICAL CENTER | Age: 55
End: 2020-03-16
Attending: EMERGENCY MEDICINE
Payer: MEDICARE

## 2020-03-16 DIAGNOSIS — R79.89 ELEVATED LACTIC ACID LEVEL: ICD-10-CM

## 2020-03-16 DIAGNOSIS — I10 ESSENTIAL HYPERTENSION: ICD-10-CM

## 2020-03-16 DIAGNOSIS — J22 LOWER RESPIRATORY INFECTION: ICD-10-CM

## 2020-03-16 PROBLEM — R00.0 TACHYCARDIA: Status: ACTIVE | Noted: 2020-03-16

## 2020-03-16 PROBLEM — E07.9 THYROID DISEASE: Status: ACTIVE | Noted: 2020-03-16

## 2020-03-16 LAB
ALBUMIN SERPL BCP-MCNC: 4.1 G/DL (ref 3.2–4.9)
ALBUMIN/GLOB SERPL: 1.3 G/DL
ALP SERPL-CCNC: 99 U/L (ref 30–99)
ALT SERPL-CCNC: 29 U/L (ref 2–50)
ANION GAP SERPL CALC-SCNC: 17 MMOL/L (ref 7–16)
APPEARANCE UR: CLEAR
AST SERPL-CCNC: 37 U/L (ref 12–45)
BASOPHILS # BLD AUTO: 0.8 % (ref 0–1.8)
BASOPHILS # BLD: 0.06 K/UL (ref 0–0.12)
BILIRUB SERPL-MCNC: 0.2 MG/DL (ref 0.1–1.5)
BILIRUB UR QL STRIP.AUTO: NEGATIVE
BUN SERPL-MCNC: 18 MG/DL (ref 8–22)
CALCIUM SERPL-MCNC: 9.1 MG/DL (ref 8.4–10.2)
CHLORIDE SERPL-SCNC: 103 MMOL/L (ref 96–112)
CO2 SERPL-SCNC: 17 MMOL/L (ref 20–33)
COLOR UR: YELLOW
CREAT SERPL-MCNC: 1.22 MG/DL (ref 0.5–1.4)
EKG IMPRESSION: NORMAL
EOSINOPHIL # BLD AUTO: 0.15 K/UL (ref 0–0.51)
EOSINOPHIL NFR BLD: 2 % (ref 0–6.9)
ERYTHROCYTE [DISTWIDTH] IN BLOOD BY AUTOMATED COUNT: 45.9 FL (ref 35.9–50)
FLUAV RNA SPEC QL NAA+PROBE: NEGATIVE
FLUBV RNA SPEC QL NAA+PROBE: NEGATIVE
GLOBULIN SER CALC-MCNC: 3.1 G/DL (ref 1.9–3.5)
GLUCOSE SERPL-MCNC: 128 MG/DL (ref 65–99)
GLUCOSE UR STRIP.AUTO-MCNC: >=1000 MG/DL
HCT VFR BLD AUTO: 43.9 % (ref 37–47)
HGB BLD-MCNC: 13.4 G/DL (ref 12–16)
IMM GRANULOCYTES # BLD AUTO: 0.02 K/UL (ref 0–0.11)
IMM GRANULOCYTES NFR BLD AUTO: 0.3 % (ref 0–0.9)
KETONES UR STRIP.AUTO-MCNC: NEGATIVE MG/DL
LACTATE BLD-SCNC: 2.9 MMOL/L (ref 0.5–2)
LEUKOCYTE ESTERASE UR QL STRIP.AUTO: NEGATIVE
LYMPHOCYTES # BLD AUTO: 1.9 K/UL (ref 1–4.8)
LYMPHOCYTES NFR BLD: 25.7 % (ref 22–41)
MAGNESIUM SERPL-MCNC: 2.2 MG/DL (ref 1.5–2.5)
MCH RBC QN AUTO: 25.6 PG (ref 27–33)
MCHC RBC AUTO-ENTMCNC: 30.5 G/DL (ref 33.6–35)
MCV RBC AUTO: 83.9 FL (ref 81.4–97.8)
MICRO URNS: ABNORMAL
MONOCYTES # BLD AUTO: 0.73 K/UL (ref 0–0.85)
MONOCYTES NFR BLD AUTO: 9.9 % (ref 0–13.4)
NEUTROPHILS # BLD AUTO: 4.53 K/UL (ref 2–7.15)
NEUTROPHILS NFR BLD: 61.3 % (ref 44–72)
NITRITE UR QL STRIP.AUTO: NEGATIVE
NRBC # BLD AUTO: 0 K/UL
NRBC BLD-RTO: 0 /100 WBC
NT-PROBNP SERPL IA-MCNC: 120 PG/ML (ref 0–125)
PH UR STRIP.AUTO: 5.5 [PH] (ref 5–8)
PLATELET # BLD AUTO: 347 K/UL (ref 164–446)
PMV BLD AUTO: 10.3 FL (ref 9–12.9)
POTASSIUM SERPL-SCNC: 4.8 MMOL/L (ref 3.6–5.5)
PROT SERPL-MCNC: 7.2 G/DL (ref 6–8.2)
PROT UR QL STRIP: NEGATIVE MG/DL
RBC # BLD AUTO: 5.23 M/UL (ref 4.2–5.4)
RBC UR QL AUTO: NEGATIVE
SODIUM SERPL-SCNC: 137 MMOL/L (ref 135–145)
SP GR UR STRIP.AUTO: <=1.005
TROPONIN T SERPL-MCNC: <6 NG/L (ref 6–19)
TSH SERPL DL<=0.005 MIU/L-ACNC: 2.49 UIU/ML (ref 0.38–5.33)
WBC # BLD AUTO: 7.4 K/UL (ref 4.8–10.8)

## 2020-03-16 PROCEDURE — 99218 PR INITIAL OBSERVATION CARE,LEVL I: CPT | Performed by: INTERNAL MEDICINE

## 2020-03-16 PROCEDURE — 84443 ASSAY THYROID STIM HORMONE: CPT

## 2020-03-16 PROCEDURE — 700111 HCHG RX REV CODE 636 W/ 250 OVERRIDE (IP): Performed by: EMERGENCY MEDICINE

## 2020-03-16 PROCEDURE — 81003 URINALYSIS AUTO W/O SCOPE: CPT

## 2020-03-16 PROCEDURE — 700105 HCHG RX REV CODE 258: Performed by: EMERGENCY MEDICINE

## 2020-03-16 PROCEDURE — 71045 X-RAY EXAM CHEST 1 VIEW: CPT

## 2020-03-16 PROCEDURE — 83036 HEMOGLOBIN GLYCOSYLATED A1C: CPT

## 2020-03-16 PROCEDURE — A9270 NON-COVERED ITEM OR SERVICE: HCPCS | Performed by: INTERNAL MEDICINE

## 2020-03-16 PROCEDURE — 80053 COMPREHEN METABOLIC PANEL: CPT

## 2020-03-16 PROCEDURE — 85025 COMPLETE CBC W/AUTO DIFF WBC: CPT

## 2020-03-16 PROCEDURE — 94760 N-INVAS EAR/PLS OXIMETRY 1: CPT

## 2020-03-16 PROCEDURE — 99285 EMERGENCY DEPT VISIT HI MDM: CPT

## 2020-03-16 PROCEDURE — 700101 HCHG RX REV CODE 250: Performed by: EMERGENCY MEDICINE

## 2020-03-16 PROCEDURE — 87502 INFLUENZA DNA AMP PROBE: CPT

## 2020-03-16 PROCEDURE — 87040 BLOOD CULTURE FOR BACTERIA: CPT

## 2020-03-16 PROCEDURE — G0378 HOSPITAL OBSERVATION PER HR: HCPCS

## 2020-03-16 PROCEDURE — 84484 ASSAY OF TROPONIN QUANT: CPT

## 2020-03-16 PROCEDURE — 87641 MR-STAPH DNA AMP PROBE: CPT

## 2020-03-16 PROCEDURE — 83735 ASSAY OF MAGNESIUM: CPT

## 2020-03-16 PROCEDURE — 83605 ASSAY OF LACTIC ACID: CPT

## 2020-03-16 PROCEDURE — 700105 HCHG RX REV CODE 258: Performed by: INTERNAL MEDICINE

## 2020-03-16 PROCEDURE — 96375 TX/PRO/DX INJ NEW DRUG ADDON: CPT

## 2020-03-16 PROCEDURE — 96367 TX/PROPH/DG ADDL SEQ IV INF: CPT

## 2020-03-16 PROCEDURE — 96366 THER/PROPH/DIAG IV INF ADDON: CPT

## 2020-03-16 PROCEDURE — 96365 THER/PROPH/DIAG IV INF INIT: CPT

## 2020-03-16 PROCEDURE — 83880 ASSAY OF NATRIURETIC PEPTIDE: CPT

## 2020-03-16 PROCEDURE — 93005 ELECTROCARDIOGRAM TRACING: CPT | Performed by: EMERGENCY MEDICINE

## 2020-03-16 PROCEDURE — 700102 HCHG RX REV CODE 250 W/ 637 OVERRIDE(OP): Performed by: INTERNAL MEDICINE

## 2020-03-16 RX ORDER — MELOXICAM 7.5 MG/1
15 TABLET ORAL EVERY EVENING
Status: DISCONTINUED | OUTPATIENT
Start: 2020-03-16 | End: 2020-03-17 | Stop reason: HOSPADM

## 2020-03-16 RX ORDER — ECHINACEA PURPUREA EXTRACT 125 MG
2 TABLET ORAL
Status: DISCONTINUED | OUTPATIENT
Start: 2020-03-16 | End: 2020-03-17 | Stop reason: HOSPADM

## 2020-03-16 RX ORDER — TIZANIDINE 4 MG/1
2-4 TABLET ORAL EVERY 6 HOURS PRN
Status: DISCONTINUED | OUTPATIENT
Start: 2020-03-16 | End: 2020-03-17 | Stop reason: HOSPADM

## 2020-03-16 RX ORDER — PROMETHAZINE HYDROCHLORIDE 25 MG/1
12.5-25 SUPPOSITORY RECTAL EVERY 4 HOURS PRN
Status: DISCONTINUED | OUTPATIENT
Start: 2020-03-16 | End: 2020-03-17 | Stop reason: HOSPADM

## 2020-03-16 RX ORDER — ACETAMINOPHEN 325 MG/1
650 TABLET ORAL EVERY 6 HOURS PRN
Status: DISCONTINUED | OUTPATIENT
Start: 2020-03-16 | End: 2020-03-17 | Stop reason: HOSPADM

## 2020-03-16 RX ORDER — PROCHLORPERAZINE EDISYLATE 5 MG/ML
5-10 INJECTION INTRAMUSCULAR; INTRAVENOUS EVERY 4 HOURS PRN
Status: DISCONTINUED | OUTPATIENT
Start: 2020-03-16 | End: 2020-03-17 | Stop reason: HOSPADM

## 2020-03-16 RX ORDER — CEVIMELINE HYDROCHLORIDE 30 MG/1
30 CAPSULE ORAL 3 TIMES DAILY
Status: DISCONTINUED | OUTPATIENT
Start: 2020-03-16 | End: 2020-03-16

## 2020-03-16 RX ORDER — DULOXETIN HYDROCHLORIDE 30 MG/1
60 CAPSULE, DELAYED RELEASE ORAL EVERY EVENING
Status: DISCONTINUED | OUTPATIENT
Start: 2020-03-16 | End: 2020-03-17 | Stop reason: HOSPADM

## 2020-03-16 RX ORDER — ENALAPRILAT 1.25 MG/ML
1.25 INJECTION INTRAVENOUS ONCE
Status: COMPLETED | OUTPATIENT
Start: 2020-03-16 | End: 2020-03-16

## 2020-03-16 RX ORDER — ONDANSETRON 4 MG/1
4 TABLET, ORALLY DISINTEGRATING ORAL EVERY 4 HOURS PRN
Status: DISCONTINUED | OUTPATIENT
Start: 2020-03-16 | End: 2020-03-17 | Stop reason: HOSPADM

## 2020-03-16 RX ORDER — SODIUM CHLORIDE 9 MG/ML
INJECTION, SOLUTION INTRAVENOUS CONTINUOUS
Status: DISCONTINUED | OUTPATIENT
Start: 2020-03-16 | End: 2020-03-17 | Stop reason: HOSPADM

## 2020-03-16 RX ORDER — ESTRADIOL 1 MG/1
0.5 TABLET ORAL DAILY
Status: DISCONTINUED | OUTPATIENT
Start: 2020-03-17 | End: 2020-03-17 | Stop reason: HOSPADM

## 2020-03-16 RX ORDER — SODIUM CHLORIDE 9 MG/ML
1000 INJECTION, SOLUTION INTRAVENOUS CONTINUOUS
Status: DISCONTINUED | OUTPATIENT
Start: 2020-03-16 | End: 2020-03-16 | Stop reason: ALTCHOICE

## 2020-03-16 RX ORDER — SODIUM CHLORIDE 9 MG/ML
1000 INJECTION, SOLUTION INTRAVENOUS CONTINUOUS
Status: ACTIVE | OUTPATIENT
Start: 2020-03-16 | End: 2020-03-16

## 2020-03-16 RX ORDER — GABAPENTIN 400 MG/1
400 CAPSULE ORAL 3 TIMES DAILY
Status: DISCONTINUED | OUTPATIENT
Start: 2020-03-16 | End: 2020-03-17 | Stop reason: HOSPADM

## 2020-03-16 RX ORDER — LOSARTAN POTASSIUM 25 MG/1
100 TABLET ORAL DAILY
Status: DISCONTINUED | OUTPATIENT
Start: 2020-03-17 | End: 2020-03-17 | Stop reason: HOSPADM

## 2020-03-16 RX ORDER — CARVEDILOL 25 MG/1
25 TABLET ORAL 2 TIMES DAILY WITH MEALS
Status: DISCONTINUED | OUTPATIENT
Start: 2020-03-16 | End: 2020-03-17 | Stop reason: HOSPADM

## 2020-03-16 RX ORDER — CYANOCOBALAMIN 1000 UG/ML
1000 INJECTION, SOLUTION INTRAMUSCULAR; SUBCUTANEOUS
Status: DISCONTINUED | OUTPATIENT
Start: 2020-03-18 | End: 2020-03-17 | Stop reason: HOSPADM

## 2020-03-16 RX ORDER — GUAIFENESIN 600 MG/1
1200 TABLET, EXTENDED RELEASE ORAL EVERY 12 HOURS
Status: DISCONTINUED | OUTPATIENT
Start: 2020-03-16 | End: 2020-03-17 | Stop reason: HOSPADM

## 2020-03-16 RX ORDER — MONTELUKAST SODIUM 10 MG/1
10 TABLET ORAL EVERY EVENING
Status: DISCONTINUED | OUTPATIENT
Start: 2020-03-16 | End: 2020-03-17 | Stop reason: HOSPADM

## 2020-03-16 RX ORDER — HYDRALAZINE HYDROCHLORIDE 20 MG/ML
20 INJECTION INTRAMUSCULAR; INTRAVENOUS ONCE
Status: COMPLETED | OUTPATIENT
Start: 2020-03-16 | End: 2020-03-16

## 2020-03-16 RX ORDER — ONDANSETRON 2 MG/ML
4 INJECTION INTRAMUSCULAR; INTRAVENOUS EVERY 4 HOURS PRN
Status: DISCONTINUED | OUTPATIENT
Start: 2020-03-16 | End: 2020-03-17 | Stop reason: HOSPADM

## 2020-03-16 RX ORDER — OMEPRAZOLE 20 MG/1
20 CAPSULE, DELAYED RELEASE ORAL DAILY
Status: DISCONTINUED | OUTPATIENT
Start: 2020-03-17 | End: 2020-03-17 | Stop reason: HOSPADM

## 2020-03-16 RX ORDER — PROMETHAZINE HYDROCHLORIDE 25 MG/1
12.5-25 TABLET ORAL EVERY 4 HOURS PRN
Status: DISCONTINUED | OUTPATIENT
Start: 2020-03-16 | End: 2020-03-17 | Stop reason: HOSPADM

## 2020-03-16 RX ORDER — HYDRALAZINE HYDROCHLORIDE 20 MG/ML
20 INJECTION INTRAMUSCULAR; INTRAVENOUS EVERY 6 HOURS PRN
Status: DISCONTINUED | OUTPATIENT
Start: 2020-03-16 | End: 2020-03-17 | Stop reason: HOSPADM

## 2020-03-16 RX ADMIN — DULOXETINE HYDROCHLORIDE 60 MG: 30 CAPSULE, DELAYED RELEASE ORAL at 21:33

## 2020-03-16 RX ADMIN — SODIUM CHLORIDE: 9 INJECTION, SOLUTION INTRAVENOUS at 21:33

## 2020-03-16 RX ADMIN — Medication 400 MG: at 21:34

## 2020-03-16 RX ADMIN — GABAPENTIN 400 MG: 400 CAPSULE ORAL at 21:34

## 2020-03-16 RX ADMIN — SODIUM CHLORIDE 1000 ML: 9 INJECTION, SOLUTION INTRAVENOUS at 17:04

## 2020-03-16 RX ADMIN — CARVEDILOL 25 MG: 25 TABLET, FILM COATED ORAL at 21:34

## 2020-03-16 RX ADMIN — ENALAPRILAT 1.25 MG: 2.5 INJECTION INTRAVENOUS at 17:06

## 2020-03-16 RX ADMIN — MONTELUKAST 10 MG: 10 TABLET, FILM COATED ORAL at 21:33

## 2020-03-16 RX ADMIN — AZTREONAM 2 G: 2 INJECTION, POWDER, LYOPHILIZED, FOR SOLUTION INTRAMUSCULAR; INTRAVENOUS at 18:03

## 2020-03-16 RX ADMIN — SODIUM CHLORIDE 1000 ML: 9 INJECTION, SOLUTION INTRAVENOUS at 17:18

## 2020-03-16 RX ADMIN — MELOXICAM 15 MG: 7.5 TABLET ORAL at 21:34

## 2020-03-16 RX ADMIN — HYDRALAZINE HYDROCHLORIDE 20 MG: 20 INJECTION INTRAMUSCULAR; INTRAVENOUS at 15:54

## 2020-03-16 RX ADMIN — VANCOMYCIN HYDROCHLORIDE 2250 MG: 500 INJECTION, POWDER, LYOPHILIZED, FOR SOLUTION INTRAVENOUS at 19:10

## 2020-03-16 RX ADMIN — GUAIFENESIN 1200 MG: 600 TABLET, EXTENDED RELEASE ORAL at 21:33

## 2020-03-16 ASSESSMENT — COGNITIVE AND FUNCTIONAL STATUS - GENERAL
SUGGESTED CMS G CODE MODIFIER DAILY ACTIVITY: CH
MOBILITY SCORE: 24
DAILY ACTIVITIY SCORE: 24
SUGGESTED CMS G CODE MODIFIER MOBILITY: CH

## 2020-03-16 ASSESSMENT — ENCOUNTER SYMPTOMS
SINUS PAIN: 1
SHORTNESS OF BREATH: 0
WHEEZING: 0
EYE PAIN: 1
COUGH: 0
PALPITATIONS: 1
SPUTUM PRODUCTION: 1
HEADACHES: 1

## 2020-03-16 ASSESSMENT — LIFESTYLE VARIABLES
AVERAGE NUMBER OF DAYS PER WEEK YOU HAVE A DRINK CONTAINING ALCOHOL: 0
TOTAL SCORE: 0
ON A TYPICAL DAY WHEN YOU DRINK ALCOHOL HOW MANY DRINKS DO YOU HAVE: 0
TOTAL SCORE: 0
TOTAL SCORE: 0
EVER HAD A DRINK FIRST THING IN THE MORNING TO STEADY YOUR NERVES TO GET RID OF A HANGOVER: NO
HAVE YOU EVER FELT YOU SHOULD CUT DOWN ON YOUR DRINKING: NO
ALCOHOL_USE: NO
EVER_SMOKED: NEVER
CONSUMPTION TOTAL: NEGATIVE
HOW MANY TIMES IN THE PAST YEAR HAVE YOU HAD 5 OR MORE DRINKS IN A DAY: 0
HAVE PEOPLE ANNOYED YOU BY CRITICIZING YOUR DRINKING: NO
EVER FELT BAD OR GUILTY ABOUT YOUR DRINKING: NO

## 2020-03-16 ASSESSMENT — PATIENT HEALTH QUESTIONNAIRE - PHQ9
SUM OF ALL RESPONSES TO PHQ9 QUESTIONS 1 AND 2: 0
1. LITTLE INTEREST OR PLEASURE IN DOING THINGS: NOT AT ALL
2. FEELING DOWN, DEPRESSED, IRRITABLE, OR HOPELESS: NOT AT ALL

## 2020-03-16 ASSESSMENT — FIBROSIS 4 INDEX: FIB4 SCORE: 0.72

## 2020-03-16 NOTE — ED TRIAGE NOTES
Chief Complaint   Patient presents with   • Cough     x 3 days   • Shortness of Breath     x 3 days    • Hypertension     185/138   Recently tx with zithromax x 2 courses for sinus infection.  Symptoms have returned.   Pt's corona screening negative.

## 2020-03-17 ENCOUNTER — APPOINTMENT (OUTPATIENT)
Dept: CARDIOLOGY | Facility: MEDICAL CENTER | Age: 55
End: 2020-03-17
Attending: INTERNAL MEDICINE
Payer: MEDICARE

## 2020-03-17 VITALS
BODY MASS INDEX: 33.2 KG/M2 | HEIGHT: 64 IN | OXYGEN SATURATION: 94 % | WEIGHT: 194.45 LBS | TEMPERATURE: 97.9 F | DIASTOLIC BLOOD PRESSURE: 65 MMHG | HEART RATE: 80 BPM | RESPIRATION RATE: 18 BRPM | SYSTOLIC BLOOD PRESSURE: 105 MMHG

## 2020-03-17 LAB
EST. AVERAGE GLUCOSE BLD GHB EST-MCNC: 123 MG/DL
HBA1C MFR BLD: 5.9 % (ref 0–5.6)
LACTATE BLD-SCNC: 1.9 MMOL/L (ref 0.5–2)
LV EJECT FRACT  99904: 70
MRSA DNA SPEC QL NAA+PROBE: NORMAL
PROCALCITONIN SERPL-MCNC: 0.04 NG/ML
SIGNIFICANT IND 70042: NORMAL
SITE SITE: NORMAL
SOURCE SOURCE: NORMAL

## 2020-03-17 PROCEDURE — 93306 TTE W/DOPPLER COMPLETE: CPT

## 2020-03-17 PROCEDURE — 700102 HCHG RX REV CODE 250 W/ 637 OVERRIDE(OP): Performed by: INTERNAL MEDICINE

## 2020-03-17 PROCEDURE — A9270 NON-COVERED ITEM OR SERVICE: HCPCS | Performed by: INTERNAL MEDICINE

## 2020-03-17 PROCEDURE — 99215 OFFICE O/P EST HI 40 MIN: CPT | Performed by: INTERNAL MEDICINE

## 2020-03-17 PROCEDURE — G0378 HOSPITAL OBSERVATION PER HR: HCPCS

## 2020-03-17 PROCEDURE — 700101 HCHG RX REV CODE 250: Performed by: INTERNAL MEDICINE

## 2020-03-17 PROCEDURE — 700105 HCHG RX REV CODE 258: Performed by: INTERNAL MEDICINE

## 2020-03-17 PROCEDURE — 84145 PROCALCITONIN (PCT): CPT

## 2020-03-17 PROCEDURE — 83605 ASSAY OF LACTIC ACID: CPT

## 2020-03-17 PROCEDURE — A9270 NON-COVERED ITEM OR SERVICE: HCPCS

## 2020-03-17 PROCEDURE — 700111 HCHG RX REV CODE 636 W/ 250 OVERRIDE (IP): Performed by: INTERNAL MEDICINE

## 2020-03-17 PROCEDURE — 93306 TTE W/DOPPLER COMPLETE: CPT | Mod: 26 | Performed by: INTERNAL MEDICINE

## 2020-03-17 PROCEDURE — 99217 PR OBSERVATION CARE DISCHARGE: CPT | Performed by: INTERNAL MEDICINE

## 2020-03-17 PROCEDURE — 700102 HCHG RX REV CODE 250 W/ 637 OVERRIDE(OP)

## 2020-03-17 PROCEDURE — 96366 THER/PROPH/DIAG IV INF ADDON: CPT

## 2020-03-17 PROCEDURE — 700117 HCHG RX CONTRAST REV CODE 255: Performed by: INTERNAL MEDICINE

## 2020-03-17 RX ORDER — FUROSEMIDE 20 MG/1
10 TABLET ORAL 2 TIMES DAILY
COMMUNITY
End: 2020-09-28

## 2020-03-17 RX ORDER — OMEPRAZOLE 20 MG/1
CAPSULE, DELAYED RELEASE ORAL
Status: COMPLETED
Start: 2020-03-17 | End: 2020-03-17

## 2020-03-17 RX ORDER — AZITHROMYCIN 500 MG/1
500 TABLET, FILM COATED ORAL DAILY
Status: ON HOLD | COMMUNITY
Start: 2020-03-03 | End: 2020-03-17

## 2020-03-17 RX ORDER — HYDRALAZINE HYDROCHLORIDE 10 MG/1
10 TABLET, FILM COATED ORAL PRN
COMMUNITY
End: 2020-04-07

## 2020-03-17 RX ADMIN — SODIUM CHLORIDE: 9 INJECTION, SOLUTION INTRAVENOUS at 08:48

## 2020-03-17 RX ADMIN — TIZANIDINE 4 MG: 4 TABLET ORAL at 10:13

## 2020-03-17 RX ADMIN — HUMAN ALBUMIN MICROSPHERES AND PERFLUTREN 3 ML: 10; .22 INJECTION, SOLUTION INTRAVENOUS at 09:35

## 2020-03-17 RX ADMIN — OMEPRAZOLE 20 MG: 20 CAPSULE, DELAYED RELEASE ORAL at 05:52

## 2020-03-17 RX ADMIN — LOSARTAN POTASSIUM 100 MG: 25 TABLET ORAL at 05:44

## 2020-03-17 RX ADMIN — GABAPENTIN 400 MG: 400 CAPSULE ORAL at 05:44

## 2020-03-17 RX ADMIN — ACETAMINOPHEN 650 MG: 325 TABLET, FILM COATED ORAL at 00:17

## 2020-03-17 RX ADMIN — CARVEDILOL 25 MG: 25 TABLET, FILM COATED ORAL at 08:50

## 2020-03-17 RX ADMIN — VANCOMYCIN HYDROCHLORIDE 1250 MG: 500 INJECTION, POWDER, LYOPHILIZED, FOR SOLUTION INTRAVENOUS at 08:48

## 2020-03-17 RX ADMIN — GUAIFENESIN 1200 MG: 600 TABLET, EXTENDED RELEASE ORAL at 05:45

## 2020-03-17 RX ADMIN — SODIUM CHLORIDE 1000 MG: 900 INJECTION INTRAVENOUS at 01:31

## 2020-03-17 RX ADMIN — ESTRADIOL 0.5 MG: 1 TABLET ORAL at 05:44

## 2020-03-17 RX ADMIN — THERA TABS 1 TABLET: TAB at 05:45

## 2020-03-17 ASSESSMENT — ENCOUNTER SYMPTOMS
SINUS PAIN: 1
VOMITING: 0
SORE THROAT: 0
NAUSEA: 0
HEMOPTYSIS: 0
DIARRHEA: 0
FEVER: 0
CHILLS: 0
ABDOMINAL PAIN: 0
HEADACHES: 0
SHORTNESS OF BREATH: 0
COUGH: 1
DIZZINESS: 0

## 2020-03-17 NOTE — ASSESSMENT & PLAN NOTE
"Patient takes both Cytomel as well as levothyroxine when I asked her about this she stated that her endocrinologist, Dr. Canales-\"wants to turn her thyroid off at night and replace it in the morning\"  Given tachycardia checking TSH before ordering either medication.  "

## 2020-03-17 NOTE — PROGRESS NOTES
Discharge order written. IV removed, patient tolerated. All personal belongings are in possession. AVS printed, reviewed and copy signed and placed on the chart. Patient has no further questions.   Discharged in satisfactory condition.PT left unit with self via walking. Staff escort  none.

## 2020-03-17 NOTE — CONSULTS
INFECTIOUS DISEASES INPATIENT CONSULT NOTE     Date of Service: 3/17/2020    Consult Requested By: Kristin Kurtz M.D.    Reason for Consultation: Chronic sinusitis, multiple antibiotic allergies    History of Present Illness:   Donna Isaac is a 54 y.o. woman with history of asthma, chronic sinusitis, right frontal cyst, congestive heart failure, GERD, and hypertension admitted 3/16/2020 for worsening cough and chest pain.  Patient has had longstanding chronic and recurrent sinusitis since her 20s.  She has a history of surgery with correction of a deviated septum in her 20s and removal of the right frontal cyst.  She follows with ENT physician, Dr. Johanny farmer.  Patient was supposed to be undergo surgery at [place] for cyst removal today however this was canceled due to the COVID crisis.  It has now been rescheduled for April 30. She states there is concern for possible frontal bone erosion and surgery could not be performed locally given location. Patient was in usual state of health until approximately 10 days ago when she developed recurrent greenish nasal discharge associated with maxillary and frontal tenderness.  She denied any fevers or chills.  No nausea or vomiting.  No headache.  She denied any pain or swelling of her known right frontal cyst.  She contacted her primary care physician who prescribed her a course of azithromycin.  She completed her course of azithromycin approximately 6 days prior to admission with clinically improvement.  After completing her course of antibiotics, she developed a nonproductive cough associated with postnasal drip and chest tightness. She felt her symptoms were different than her asthma as she did not have any wheezing.  Given her symptoms, she contacted her primary care physician who recommended that she go to the ED for further evaluation and management.  On presentation, she was afebrile with a white count of 7.4.  Lactic acid was elevated at  2.9.  Chest x-ray was negative.  Coronavirus screening was also negative.  Influenza PCR negative.  Procalcitonin negative.  Blood cultures on admission are negative to date.  She was started on vancomycin and aztreonam secondary to multiple antibiotic allergies.  At this time, she states her chest pain has significantly improved.  She has also noted decreased cough.  Infectious disease service consulted for further antibiotic recommendations.    Review of Systems   Constitutional: Negative for chills and fever.   HENT: Positive for congestion and sinus pain. Negative for sore throat.    Respiratory: Positive for cough. Negative for hemoptysis and shortness of breath.    Cardiovascular: Negative for chest pain.   Gastrointestinal: Negative for abdominal pain, diarrhea, nausea and vomiting.   Genitourinary: Negative for dysuria.   Neurological: Negative for dizziness and headaches.   All other systems reviewed and are negative.      PMH:   Past Medical History:   Diagnosis Date   • Asthma    • Chronic pain    • Congestive heart failure ([facility])     Cardiologist, Dr. Carlson   • Fibromyalgia    • GERD (gastroesophageal reflux disease)    • Hypertension    • Migraine    • Osteoporosis    • Renal disorder     CKD   • Seizure ([facility])    • TBI (traumatic brain injury) ([facility])    • Urticaria        PSH:  Past Surgical History:   Procedure Laterality Date   • SHOULDER DECOMPRESSION ARTHROSCOPIC Left 2/19/2019    Procedure: SHOULDER DECOMPRESSION ARTHROSCOPIC - SUBACROMIAL;  Surgeon: Camila Elkins M.D.;  Location: SURGERY [facility];  Service: Orthopedics   • CLAVICLE DISTAL EXCISION Left 2/19/2019    Procedure: CLAVICLE DISTAL EXCISION;  Surgeon: Camila Elkins M.D.;  Location: [facility];  Service: Orthopedics   • SHOULDER ARTHROSCOPY W/ BICIPITAL TENODESIS REPAIR Left 2/19/2019    Procedure: SHOULDER ARTHROSCOPY W/ BICIPITAL TENODESIS REPAIR;  Surgeon: Camila Elkins M.D.;  Location:  SURGERY [facility];  Service: Orthopedics   • GASTROSCOPY N/A 2/7/2019    Procedure: GASTROSCOPY- DIALATION AND BIOPSY;  Surgeon: Hola Veliz M.D.;  Location: SURGERY [facility];  Service: Gastroenterology   • ABDOMINAL EXPLORATION  2002   • GASTRIC BYPASS LAPAROSCOPIC  1999   • HYSTERECTOMY, TOTAL ABDOMINAL  1995   • APPENDECTOMY  1974       FAMILY HX:  Family History   Problem Relation Age of Onset   • Heart Disease Mother    • Diabetes Mother    • Heart Failure Mother    • Hypertension Mother    • Hypertension Father    • Heart Disease Brother    • Heart Attack Brother    • Hypertension Brother        SOCIAL HX:  Social History     Socioeconomic History   • Marital status:      Spouse name: Not on file   • Number of children: Not on file   • Years of education: Not on file   • Highest education level: Not on file   Occupational History   • Not on file   Social Needs   • Financial resource strain: Not on file   • Food insecurity     Worry: Never true     Inability: Never true   • Transportation needs     Medical: No     Non-medical: No   Tobacco Use   • Smoking status: Never Smoker   • Smokeless tobacco: Never Used   Substance and Sexual Activity   • Alcohol use: Not Currently   • Drug use: No   • Sexual activity: Not on file   Lifestyle   • Physical activity     Days per week: Not on file     Minutes per session: Not on file   • Stress: Not on file   Relationships   • Social connections     Talks on phone: Not on file     Gets together: Not on file     Attends [employer] service: Not on file     Active member of club or organization: Not on file     Attends meetings of clubs or organizations: Not on file     Relationship status: Not on file   • Intimate partner violence     Fear of current or ex partner: Not on file     Emotionally abused: Not on file     Physically abused: Not on file     Forced sexual activity: Not on file   Other Topics Concern   • Not on file   Social History Narrative  "  • Not on file     Social History     Tobacco Use   Smoking Status Never Smoker   Smokeless Tobacco Never Used     Social History     Substance and Sexual Activity   Alcohol Use Not Currently       Allergies/Intolerances:  Allergies   Allergen Reactions   • Bactrim [Sulfamethoxazole W-Trimethoprim] Shortness of Breath   • Bee Venom Anaphylaxis   • Buprenorphine Anaphylaxis   • Doxycycline Hives and Shortness of Breath   • Econazole Anaphylaxis   • Floxin [Ofloxacin] Anaphylaxis, Shortness of Breath and Swelling     Cause throat swelling and difficulty breathing   • Gadolinium Derivatives Hives and Swelling     Throat swelling   • Iodine Shortness of Breath and Anaphylaxis     Throat and tongue swelling with IV contrast   • Keflex Shortness of Breath   • Levaquin Shortness of Breath     anaphlyaxis   • Levofloxacin Shortness of Breath   • Morphine Anaphylaxis   • Naloxone Hives     \"hives, SOB\"   • Nitrofurantoin Shortness of Breath     ...and hives     • Norco [Apap-Fd&C Yellow #10 Al Tai-Hydrocodone] Shortness of Breath     ...and hives     • Oxycodone Shortness of Breath     ...and hives     • Penicillins Shortness of Breath     ...and hives     • Tape Contact Dermatitis     Blisters, clear tegaderm ok.. No steristrips.   • Ancef [Cefazolin] Hives   • Bextra [Valdecoxib] Rash     \"Skin burn and peeling.\"   • Flagyl [Kdc:Metronidazole+Tartrazine] Hives   • Linezolid Rash     Rash all over body   • Other Drug Hives     \"Enconazole nitrate.\" shortness of breath and hives   • Other Misc Unspecified     Adhesive tape: blisters, skin peels off   • Clindamycin      HIVES     • Tygacil [Tigecycline]      itching   • Zithromax [Azithromycin] Hives and Shortness of Breath     Pt had Hives also   Azithromycin is the only oral antibiotic that the patient tolerates.  She states she can take azithromycin with Vistaril.    History reviewed with the patient     Other Current Medications:    Current Facility-Administered " Medications:   •  MD Alert...Vancomycin per Pharmacy, , Other, PHARMACY TO DOSE, Kristin Krutz M.D.  •  carvedilol (COREG) tablet 25 mg, 25 mg, Oral, BID WITH MEALS, Kristin Kurtz M.D., 25 mg at 03/17/20 0850  •  [START ON 3/18/2020] cyanocobalamin (VITAMIN B-12) injection 1,000 mcg, 1,000 mcg, Injection, Q WEDNESDAY, Kristin Kurtz M.D.  •  omeprazole (PRILOSEC) capsule 20 mg, 20 mg, Oral, DAILY, Kristin Kurtz M.D., 20 mg at 03/17/20 0552  •  DULoxetine (CYMBALTA) capsule 60 mg, 60 mg, Oral, Q EVENING, Kristin Kurtz M.D., 60 mg at 03/16/20 2133  •  estradiol (ESTRACE) tablet 0.5 mg, 0.5 mg, Oral, DAILY, Kristin Kurtz M.D., 0.5 mg at 03/17/20 0544  •  gabapentin (NEURONTIN) capsule 400 mg, 400 mg, Oral, TID, Kristin Kurtz M.D., 400 mg at 03/17/20 0544  •  losartan (COZAAR) tablet 100 mg, 100 mg, Oral, DAILY, Kristin Kurtz M.D., 100 mg at 03/17/20 0544  •  magnesium oxide (MAG-OX) tablet 400 mg, 400 mg, Oral, Q EVENING, Kristin Kurtz M.D., 400 mg at 03/16/20 2134  •  meloxicam (MOBIC) tablet 15 mg, 15 mg, Oral, Q EVENING, Kristin Kurtz M.D., 15 mg at 03/16/20 2134  •  montelukast (SINGULAIR) tablet 10 mg, 10 mg, Oral, Q EVENING, Kristin Kurtz M.D., 10 mg at 03/16/20 2133  •  multivitamin (THERAGRAN) tablet 1 Tab, 1 Tab, Oral, DAILY, Kristin Kurtz M.D., 1 Tab at 03/17/20 0545  •  tizanidine (ZANAFLEX) tablet 2-4 mg, 2-4 mg, Oral, Q6HRS PRN, Kristin Kurtz M.D.  •  guaiFENesin ER (MUCINEX) tablet 1,200 mg, 1,200 mg, Oral, Q12HRS, Kristin Kurtz M.D., 1,200 mg at 03/17/20 0545  •  sodium chloride (OCEAN) 0.65 % nasal spray 2 Spray, 2 Spray, Nasal, Q2HRS PRN, Kristin Kurtz M.D.  •  hydrALAZINE (APRESOLINE) injection 20 mg, 20 mg, Intravenous, Q6HRS PRN, Kristin Kurtz M.D.  •  vancomycin (VANCOCIN) 1,250 mg in  mL IVPB, 15 mg/kg, Intravenous, Q12HR, Kristin Kurtz M.D., Last Rate: 125  "mL/hr at 20 0848, 1,250 mg at 20 0848  •  Respiratory Therapy Consult, , Nebulization, Continuous RT, Kristin Kurtz M.D.  •  NS infusion, , Intravenous, Continuous, Kristin Kurtz M.D., Last Rate: 100 mL/hr at 20 0848  •  acetaminophen (TYLENOL) tablet 650 mg, 650 mg, Oral, Q6HRS PRN, Kristin Kurtz M.D., 650 mg at 20 0017  •  ondansetron (ZOFRAN) syringe/vial injection 4 mg, 4 mg, Intravenous, Q4HRS PRN, Kristin Kurtz M.D.  •  ondansetron (ZOFRAN ODT) dispertab 4 mg, 4 mg, Oral, Q4HRS PRN, Kristin Kurtz M.D.  •  promethazine (PHENERGAN) tablet 12.5-25 mg, 12.5-25 mg, Oral, Q4HRS PRN, Kristin Kurtz M.D.  •  promethazine (PHENERGAN) suppository 12.5-25 mg, 12.5-25 mg, Rectal, Q4HRS PRN, Kristin Kurtz M.D.  •  prochlorperazine (COMPAZINE) injection 5-10 mg, 5-10 mg, Intravenous, Q4HRS PRN, Kristin Kurtz M.D.  •  aztreonam (AZACTAM) 1,000 mg in  mL IVPB, 1,000 mg, Intravenous, Q8HRS, Kristin Kurtz M.D., Stopped at 20 0201  [unfilled]    Most Recent Vital Signs:  /90   Pulse 96   Temp 36.7 °C (98.1 °F) (Temporal)   Resp 18   Ht 1.626 m (5' 4\")   Wt 88.2 kg (194 lb 7.1 oz)   LMP 2016 (Within Months)   SpO2 96%   BMI 33.38 kg/m²   Temp  Av.5 °C (97.7 °F)  Min: 36.2 °C (97.1 °F)  Max: 36.8 °C (98.3 °F)    Physical Exam:  General: well nourished, no diaphoresis, well-appearing, no acute distress  HEENT: sclera anicteric, PERRL, extraocular muscles intact, mucous membranes moist, oropharynx clear and moist, no oral lesions or exudate, right frontal cyst above right eyebrow without any erythema. Bilateral maxillary tenderness to palpation  Neck: supple, no lymphadenopathy  Chest: CTAB, no rales, rhonchi or wheezes, normal work of breathing.  Cardiac: regular rate and rhythm, normal S1 S2, no murmurs, rubs or gallops  Abdomen: + bowel sounds, soft, non-tender, non-distended, no " "hepatosplenomegaly  Extremities: WWP, no edema, 2+ pedal pulses  Skin: warm and dry, no rashes or worrisome lesions  Neuro: Alert and oriented times 3, non-focal exam, speech fluent, full range of motion to bilateral upper and lower extremities  Psych: normal mood and behavior, pleasant; memory intact, normal judgement    Pertinent Lab Results:  Recent Labs     03/16/20  1550   WBC 7.4      Recent Labs     03/16/20  1550   HEMOGLOBIN 13.4   HEMATOCRIT 43.9   MCV 83.9   MCH 25.6*   PLATELETCT 347         Recent Labs     03/16/20  1550   SODIUM 137   POTASSIUM 4.8   CHLORIDE 103   CO2 17*   CREATININE 1.22        Recent Labs     03/16/20  1550   ALBUMIN 4.1        Pertinent Micro:  Results     Procedure Component Value Units Date/Time    MRSA By PCR (Amp) [[id number]] Collected:  03/16/20 2146    Order Status:  Completed Specimen:  Respirate from Nares Updated:  03/17/20 0827    BLOOD CULTURE [[id number]] Collected:  03/16/20 1550    Order Status:  Completed Specimen:  Blood from Peripheral Updated:  03/17/20 0615     Significant Indicator NEG     Source BLD     Site PERIPHERAL     Culture Result No Growth  Note: Blood cultures are incubated for 5 days and  are monitored continuously.Positive blood cultures  are called to the RN and reported as soon as  they are identified.  Blood culture testing and Gram stain, if indicated, are  performed at [facility], [address] [place] [address].  Positive blood cultures are  sent to [facility], [address] [place] [address], for organism identification and  susceptibility testing.      Narrative:       Per Hospital Policy: Only change Specimen Src: to \"Line\" if  specified by physician order.  Left AC    BLOOD CULTURE [[id number]] Collected:  03/16/20 1548    Order Status:  Completed Specimen:  Blood from Peripheral Updated:  03/17/20 0615     Significant Indicator NEG     Source BLD     Site PERIPHERAL     Culture Result No " "Growth  Note: Blood cultures are incubated for 5 days and  are monitored continuously.Positive blood cultures  are called to the RN and reported as soon as  they are identified.  Blood culture testing and Gram stain, if indicated, are  performed at [facility], [address] [place] [address].  Positive blood cultures are  sent to [facility], [address] [place] [address], for organism identification and  susceptibility testing.      Narrative:       Per Hospital Policy: Only change Specimen Src: to \"Line\" if  specified by physician order.  Right AC    CULTURE RESPIRATORY W/ GRM STN [[id number]]     Order Status:  Sent Specimen:  Respirate from Sputum     URINALYSIS,CULTURE IF INDICATED [[id number]]  (Abnormal) Collected:  03/16/20 1708    Order Status:  Completed Specimen:  Urine Updated:  03/16/20 1714     Color Yellow     Character Clear     Specific Gravity <=1.005     Ph 5.5     Glucose >=1000 mg/dL      Ketones Negative mg/dL      Protein Negative mg/dL      Bilirubin Negative     Nitrite Negative     Leukocyte Esterase Negative     Occult Blood Negative     Micro Urine Req see below     Comment: Microscopic examination not performed when specimen is clear  and chemically negative for protein, blood, leukocyte esterase  and nitrite.         BLOOD CULTURE x2 [[id number]] Collected:  03/16/20 1655    Order Status:  Canceled Specimen:  Other from Peripheral     BLOOD CULTURE x2 [[id number]] Collected:  03/16/20 1650    Order Status:  Canceled Specimen:  Other from Peripheral     Influenza A/B By PCR (Adult - Flu Only) [[id number]] Collected:  03/16/20 1550    Order Status:  Completed Specimen:  Blood from Nasopharyngeal Updated:  03/16/20 1633     Influenza virus A RNA Negative     Influenza virus B, PCR Negative        Blood Culture   Date Value Ref Range Status   09/26/2018   Final    No growth after 5 days of incubation.  Blood culture testing and Gram stain, if " indicated, are  performed at [facility], [address] [place] [address].  Positive blood cultures are  sent to [facility], [address] [place] [address], for organism identification and  susceptibility testing.          Studies:  Dx-chest-portable (1 View)    Result Date: 3/16/2020  3/16/2020 3:14 PM HISTORY/REASON FOR EXAM: Cough TECHNIQUE/EXAM DESCRIPTION AND NUMBER OF VIEWS: Single portable view of the chest. COMPARISON: None FINDINGS: There is no evidence of focal consolidation or evidence of pulmonary edema. There is no pleural effusion. The heart is normal in size.     No evidence of acute cardiopulmonary process.    Ec-echocardiogram Complete W/ Cont    Result Date: 3/17/2020  Results Will be Available after Interpretation by Cardiologist.      IMPRESSION:   1.  Chronic sinusitis   2.  Postnasal drip  3.  Right frontal cyst      PLAN:   Donna Isaac is a 54 y.o. woman with a history of chronic and recurrent sinusitis, known right frontal cyst, and asthma admitted for cough, sinusitis and chest pain.  She recently completed a course of azithromycin. Troponin was negative.  Chest x-ray was negative.  Influenza PCR negative.  Procalcitonin negative.  Patient clinically does not have any evidence of pneumonia nor does she appear to have an acute sinus infection.  Patient is clinically stable and does not appear ill.  She does not have any fevers or elevated white count.  She has some chronic greenish discharge from her nasal cavity but suffers from chronic sinusitis. Unfortunately, she has multiple antibiotic allergies that are significant.  No abx are warranted at this time as she has no signs of acute infection. She already has multiple allergies and she can only tolerate one oral abx (azithromycin). With her recent course of azithromycin, we would like to save this abx class for future use in episodes of active infection. Chronic use will only  increase resistance. Pt should follow up with her ENT physician post-discharge. No ID clinic follow up needed.        Plan of care discussed with IM Kristin Kurtz M.D.. Sign off.     Belgica Garcia M.D.      Please note that this dictation was created using voice recognition software. I have worked with technical experts from [facility] to optimize the interface.  I have made every reasonable attempt to correct obvious errors, but there may be errors of grammar and possibly content that I did not discover before finalizing the note.

## 2020-03-17 NOTE — FLOWSHEET NOTE
03/16/20 2210   Events/Summary/Plan   Events/Summary/Plan Assessment   Vital Signs   Pulse 98   Respiration 18   Pulse Oximetry 96 %   $ Pulse Oximetry (Spot Check) Yes   Respiratory Assessment   Level of Consciousness Alert   Breath Sounds   RUL Breath Sounds Clear   RML Breath Sounds Clear   RLL Breath Sounds Clear;Diminished   TRISTAN Breath Sounds Clear   LLL Breath Sounds Clear;Diminished   Oxygen   O2 Delivery Device None - Room Air

## 2020-03-17 NOTE — PROGRESS NOTES
Bedside report given to Ana Cristina ROBERTSON. POC discussed. Pt resting comfortably in bed. Safety precautions in place.

## 2020-03-17 NOTE — CARE PLAN
Problem: Communication  Goal: The ability to communicate needs accurately and effectively will improve  Outcome: PROGRESSING AS EXPECTED  Note: Pt oriented to the call light. POC discussed including echo, IV ABX, IVF, pt educated on unfamiliar medications, encouraged to call for assistance       Problem: Safety  Goal: Will remain free from injury  Outcome: PROGRESSING AS EXPECTED  Note: Pt call light and belongings with in reach, bed in locked and low position, treaded socks on, bed rails up x2

## 2020-03-17 NOTE — DISCHARGE SUMMARY
Discharge Summary    CHIEF COMPLAINT ON ADMISSION  Chief Complaint   Patient presents with   • Cough     x 3 days   • Shortness of Breath     x 3 days    • Hypertension     185/138       Reason for Admission  Cough, Shortness of Breath     Admission Date  3/16/2020    CODE STATUS  Full Code    HPI & HOSPITAL COURSE  This is a 54 y.o. female here with recurrent mucopurulence sinusitis, this is causing her postnasal drip and coughing but was not at this time impacting her asthma.  The patient has multiple antibiotic allergies and is only able to tolerate oral Zithromax, she has taken 3 separate courses of this but a few days after cessation her symptoms would return.  She was supposed to have surgery at [facility] to remove a cyst from her frontal sinus however this was postponed due to the national concern for covid19 and cancellation of all elective surgeries.    She was empirically started on aztreonam as well as vancomycin, she had a previous sputum culture in January that grew MSSA.    Overall the patient did not appear to be significantly ill she had mild wheezing on admission.    I requested infectious disease consult, after overall consideration and mild severity of illness it was recommended that she not continue on antibiotics but continue on topical treatment such as saline irrigation and possible gentamicin irrigation.  She is to follow-up with her ENT Dr. Butler.    Discharge instructions were discussed with the patient and she was agreeable with the plan of care.       Therefore, she is discharged in good and stable condition to home with close outpatient follow-up.      Discharge Date  3/17/2020    FOLLOW UP ITEMS POST DISCHARGE  ENT    DISCHARGE DIAGNOSES  Active Problems:    Fibromyalgia POA: Yes    Multiple allergies POA: Yes    CKD (chronic kidney disease), stage III (HCC) POA: Yes    Takotsubo syndrome POA: Yes    Chronic cough POA: Yes    Tachycardia POA: Unknown    Thyroid disease POA: Unknown     Hypertension POA: Unknown  Resolved Problems:    * No resolved hospital problems. *      FOLLOW UP  Future Appointments   Date Time Provider Department Center   4/21/2020 10:00 AM Adriana Carlson M.D. [facility] None   5/12/2020  9:20 AM Michael J Bloch, M.D. [facility] None   5/12/2020 10:40 AM JASPER Del Toro [facility] None     No follow-up provider specified.    MEDICATIONS ON DISCHARGE     Medication List      CONTINUE taking these medications      Instructions   Aimovig 70 MG/ML Soaj  Generic drug:  Erenumab   1 mL by Injection route Q30 DAYS.  Dose:  1 mL     albuterol 108 (90 Base) MCG/ACT Aers inhalation aerosol   Inhale 2 Puffs by mouth every 6 hours as needed for Shortness of Breath.  Dose:  2 Puff     B-12 1000 MCG/ML Kit   1,000 mcg by Injection route every 7 days. On Tue  Dose:  1,000 mcg     Biotin 5000 MCG Caps   Take 5,000 mcg by mouth every day.  Dose:  5,000 mcg     calcitonin (salmon) 200 UNIT/ACT Soln  Commonly known as:  MIACALCIN   Spray 1 Spray in nose every bedtime.  Dose:  1 Spray     CALCIUM-MAGNESIUM-ZINC PO   Take 1 Tab by mouth every day.  Dose:  1 Tab     carvedilol 25 MG Tabs  Commonly known as:  COREG   Take 25 mg by mouth 2 times a day, with meals.  Dose:  25 mg     cevimeline 30 MG capsule  Commonly known as:  EVOXAC   Take 1 Cap by mouth 3 times a day.  Dose:  30 mg     colesevelam 625 MG Tabs  Commonly known as:  WELCHOL   Take 625 mg by mouth 3 times a day, with meals.  Dose:  625 mg     Dexilant 60 MG Cpdr delayed-release capsule  Generic drug:  Dexlansoprazole   Take 60 mg by mouth every bedtime.  Dose:  60 mg     DULoxetine 60 MG Cpep delayed-release capsule  Commonly known as:  CYMBALTA   Take 60 mg by mouth every evening.  Dose:  60 mg     EPINEPHrine 0.3 MG/0.3ML Soaj solution for injection  Commonly known as:  EPIPEN   0.3 mg by Intramuscular route Once. Indications: Life-Threatening Hypersensitivity Reaction  Dose:  0.3 mg     estradiol 0.5 MG tablet  Commonly known as:   ESTRACE   Take 0.5 mg by mouth every day.  Dose:  0.5 mg     famotidine 40 MG Tabs  Commonly known as:  PEPCID   Take 40 mg by mouth every bedtime.  Dose:  40 mg     Frova 2.5 MG Tabs  Generic drug:  Frovatriptan Succinate   Take 2.5 mg by mouth as needed (For migraines).  Dose:  2.5 mg     furosemide 20 MG Tabs  Commonly known as:  LASIX   Take 10 mg by mouth 2 times a day. Pt takes @@ 0600 and 1100  Dose:  10 mg     gabapentin 400 MG Caps  Commonly known as:  NEURONTIN   Take 1 Cap by mouth 3 times a day.  Dose:  400 mg     hydrALAZINE 10 MG Tabs  Commonly known as:  APRESOLINE   Take 10 mg by mouth as needed (If high >180/110).  Dose:  10 mg     KLS Aller-Carmelita 10 MG Tabs  Generic drug:  cetirizine   Take 20 mg by mouth 2 times a day.  Dose:  20 mg     liothyronine 25 MCG Tabs  Commonly known as:  CYTOMEL   Take 25 mcg by mouth every evening.  Dose:  25 mcg     losartan 100 MG Tabs  Commonly known as:  COZAAR   Take 1 Tab by mouth every day.  Dose:  100 mg     magnesium oxide 400 MG Tabs tablet  Commonly known as:  MAG-OX   Take 400 mg by mouth every evening.  Dose:  400 mg     Mobic 15 MG tablet  Generic drug:  meloxicam   Take 15 mg by mouth every evening.  Dose:  15 mg     montelukast 10 MG Tabs  Commonly known as:  SINGULAIR   Take 10 mg by mouth every evening.  Dose:  10 mg     multivitamin Tabs   Take 1 Tab by mouth every day.  Dose:  1 Tab     pantoprazole 40 MG Tbec  Commonly known as:  PROTONIX   Take 40 mg by mouth 2 times a day.  Dose:  40 mg     potassium chloride SA 20 MEQ Tbcr  Commonly known as:  Kdur   Take 1 Tab by mouth every day.  Dose:  20 mEq     PROBIOTIC PO   Take 1 Cap by mouth every day.  Dose:  1 Cap     Somatropin 10 MG Solr   Inject 0.3 mg as instructed every bedtime.  Dose:  0.3 mg     spironolactone 25 MG Tabs  Commonly known as:  ALDACTONE   Take 25 mg by mouth every evening.  Dose:  25 mg     Synthroid 75 MCG Tabs  Generic drug:  levothyroxine   Take 75 mcg by mouth Every morning on  "an empty stomach.  Dose:  75 mcg     tizanidine 4 MG Tabs  Commonly known as:  ZANAFLEX   Take 2-4 mg by mouth every 6 hours as needed. Indications: Muscle Spasticity  Dose:  2-4 mg     Vitamin D 50 MCG (2000 UT) Caps   Take 2,000 Units by mouth every day.  Dose:  2,000 Units     Vitamin K2 100 MCG Caps   Take 1 Tab by mouth every day.  Dose:  1 Tab        STOP taking these medications    Zithromax 500 MG tablet  Generic drug:  azithromycin            Allergies  Allergies   Allergen Reactions   • Bactrim [Sulfamethoxazole W-Trimethoprim] Shortness of Breath   • Bee Venom Anaphylaxis   • Buprenorphine Anaphylaxis   • Doxycycline Hives and Shortness of Breath   • Econazole Anaphylaxis   • Floxin [Ofloxacin] Anaphylaxis, Shortness of Breath and Swelling     Cause throat swelling and difficulty breathing   • Gadolinium Derivatives Hives and Swelling     Throat swelling   • Iodine Shortness of Breath and Anaphylaxis     Throat and tongue swelling with IV contrast   • Keflex Shortness of Breath   • Levaquin Shortness of Breath     anaphlyaxis   • Levofloxacin Shortness of Breath   • Morphine Anaphylaxis   • Naloxone Hives     \"hives, SOB\"   • Nitrofurantoin Shortness of Breath     ...and hives     • Norco [Apap-Fd&C Yellow #10 Al Tai-Hydrocodone] Shortness of Breath     ...and hives     • Oxycodone Shortness of Breath     ...and hives     • Penicillins Shortness of Breath     ...and hives     • Tape Contact Dermatitis     Blisters, clear tegaderm ok.. No steristrips.   • Ancef [Cefazolin] Hives   • Bextra [Valdecoxib] Rash     \"Skin burn and peeling.\"   • Flagyl [Kdc:Metronidazole+Tartrazine] Hives   • Linezolid Rash     Rash all over body   • Other Drug Hives     \"Enconazole nitrate.\" shortness of breath and hives   • Other Misc Unspecified     Adhesive tape: blisters, skin peels off   • Clindamycin      HIVES     • Tygacil [Tigecycline]      itching   • Zithromax [Azithromycin] Hives and Shortness of Breath     Pt had " Hives also       DIET  Orders Placed This Encounter   Procedures   • Diet Order Regular     Standing Status:   Standing     Number of Occurrences:   1     Order Specific Question:   Diet:     Answer:   Regular [1]       ACTIVITY  regular    CONSULTATIONS  ID    PROCEDURES  none    LABORATORY  Lab Results   Component Value Date    SODIUM 137 03/16/2020    POTASSIUM 4.8 03/16/2020    CHLORIDE 103 03/16/2020    CO2 17 (L) 03/16/2020    GLUCOSE 128 (H) 03/16/2020    BUN 18 03/16/2020    CREATININE 1.22 03/16/2020        Lab Results   Component Value Date    WBC 7.4 03/16/2020    HEMOGLOBIN 13.4 03/16/2020    HEMATOCRIT 43.9 03/16/2020    PLATELETCT 347 03/16/2020

## 2020-03-17 NOTE — PROGRESS NOTES
Telemetry Shift Summary    Rhythm SR/ST with 1st degree   HR Range   Ectopy rPVC  Measurements 0.22/0.10/0.32        Normal Values  Rhythm SR  HR Range    Measurements 0.12-0.20 / 0.06-0.10  / 0.30-0.52

## 2020-03-17 NOTE — H&P
Toes.  It does radiate to his ankle [facility] History & Physical Note    Date of Service  3/16/2020    Primary Care Physician  Madisyn Mccullough M.D.    Consultants  none    Code Status  Full    Chief Complaint  Sinusitis, cough recurrent and HTN    History of Presenting Illness  54 y.o. female who presented 3/16/2020 with recurrent sinus infection.  She was scheduled to have surgery at [place] to remove a cyst from the right frontal sinus however as this is an elective surgery it was canceled due to the coronavirus concerns.  She has had 3 consecutive bouts of green to brown nasal discharge and postnasal drip causing cough.  They are transiently improved with Z-Edmundo but she is completed her third 1 and 3 days later her symptoms have returned.  She claims that her asthma is well controlled but does complain of a constant pressure on her chest like something is sitting on her chest has stated this pressure is constant not related to activity.  She is also had hypertension and tachycardia.  She is compliant with her Coreg.  She denies any fever or chills, her Covid19 risk screening is negative.      Review of Systems  Review of Systems   HENT: Positive for congestion and sinus pain.    Eyes: Positive for pain (R).   Respiratory: Positive for sputum production. Negative for cough, shortness of breath and wheezing.    Cardiovascular: Positive for chest pain (constant pressure and heaviness) and palpitations.   Neurological: Positive for headaches.       Past Medical History   has a past medical history of Asthma, Chronic pain, Congestive heart failure (HCC), Fibromyalgia, GERD (gastroesophageal reflux disease), Hypertension, Migraine, Osteoporosis, Renal disorder, Seizure ([facility]), TBI (traumatic brain injury) ([facility]), and Urticaria.    Surgical History   has a past surgical history that includes appendectomy (1974); gastroscopy (N/A, 2/7/2019); hysterectomy, total abdominal (1995); gastric bypass laparoscopic  "(1999); abdominal exploration (2002); shoulder decompression arthroscopic (Left, 2/19/2019); clavicle distal excision (Left, 2/19/2019); and shoulder arthroscopy w/ bicipital tenodesis repair (Left, 2/19/2019). Cholecystectomy, ORIF L Tibia, ankle. ORIF R ankle, multiple sinus surgery, ORIF Left wrist, R BRENDA, Lumbar fusion    Family History  family history includes Diabetes in her mother; Heart Attack in her brother; Heart Disease in her brother and mother; Heart Failure in her mother; Hypertension in her brother, father, and mother. Breast CA MGM    Social History   reports that she has never smoked. She has never used smokeless tobacco. She reports previous alcohol use. She reports that she does not use drugs..Lives with spouse and another  couple    Allergies  Allergies   Allergen Reactions   • Bactrim [Sulfamethoxazole W-Trimethoprim] Shortness of Breath   • Bee Venom Anaphylaxis   • Buprenorphine Anaphylaxis   • Doxycycline Hives and Shortness of Breath   • Econazole Anaphylaxis   • Floxin [Ofloxacin] Anaphylaxis, Shortness of Breath and Swelling     Cause throat swelling and difficulty breathing   • Gadolinium Derivatives Hives and Swelling     Throat swelling   • Iodine Shortness of Breath and Anaphylaxis     Throat and tongue swelling with IV contrast   • Keflex Shortness of Breath   • Levaquin Shortness of Breath     anaphlyaxis   • Levofloxacin Shortness of Breath   • Morphine Anaphylaxis   • Naloxone Hives     \"hives, SOB\"   • Nitrofurantoin Shortness of Breath     ...and hives     • Norco [Apap-Fd&C Yellow #10 Al Tai-Hydrocodone] Shortness of Breath     ...and hives     • Oxycodone Shortness of Breath     ...and hives     • Penicillins Shortness of Breath     ...and hives     • Tape Contact Dermatitis     Blisters, clear tegaderm ok.. No steristrips.   • Ancef [Cefazolin] Hives   • Bextra [Valdecoxib] Rash     \"Skin burn and peeling.\"   • Flagyl [Kdc:Metronidazole+Tartrazine] Hives   • Linezolid Rash    " " Rash all over body   • Other Drug Hives     \"Enconazole nitrate.\" shortness of breath and hives   • Other Misc Unspecified     Adhesive tape: blisters, skin peels off   • Clindamycin      HIVES     • Tygacil [Tigecycline]      itching   • Zithromax [Azithromycin] Hives and Shortness of Breath     Pt had Hives also       Medications  Prior to Admission Medications   Prescriptions Last Dose Informant Patient Reported? Taking?   AIMOVIG 70 MG/ML Solution Auto-injector  Patient Yes No   Si mL by Injection route Q30 DAYS.   Biotin 5000 MCG Cap  Patient Yes No   Sig: Take 5,000 mcg by mouth every day.   CALCIUM-MAGNESIUM-ZINC PO  Patient Yes No   Sig: Take 1 Tab by mouth every day.   Cholecalciferol (VITAMIN D) 2000 units Cap  Patient Yes No   Sig: Take 2,000 Units by mouth every day.   Cyanocobalamin (B-12) 1000 MCG/ML Kit  Patient Yes No   Si,000 mcg by Injection route every 7 days. On Wednesday   DULoxetine (CYMBALTA) 60 MG Cap DR Particles delayed-release capsule  Patient Yes No   Sig: Take 60 mg by mouth every evening.   Dexlansoprazole (DEXILANT) 60 MG CAPSULE DELAYED RELEASE delayed-release capsule  Patient Yes No   Sig: Take 60 mg by mouth every day.   EPINEPHrine (EPIPEN) 0.3 MG/0.3ML Solution Auto-injector solution for injection  Patient Yes No   Si.3 mg by Intramuscular route Once. Indications: Life-Threatening Hypersensitivity Reaction   Frovatriptan Succinate (FROVA) 2.5 MG Tab  Patient Yes No   Sig: Take 2.5 mg by mouth as needed (For migraines).   Menaquinone-7 (VITAMIN K2) 100 MCG Cap  Patient Yes No   Sig: Take 1 Tab by mouth every day.   Probiotic Product (PROBIOTIC PO)  Patient Yes No   Sig: Take 1 Cap by mouth every day.   SYNTHROID 75 MCG Tab  Patient Yes No   Sig: Take 75 mcg by mouth Every morning on an empty stomach.   Somatropin 10 MG Recon Soln  Patient Yes No   Sig: Inject 10 mg as instructed every bedtime.   acetaminophen (TYLENOL) 325 MG Tab  Patient Yes No   Sig: Take 650 mg by " mouth every four hours as needed for Moderate Pain.   albuterol 108 (90 Base) MCG/ACT Aero Soln inhalation aerosol  Patient No No   Sig: Inhale 2 Puffs by mouth every 6 hours as needed for Shortness of Breath.   calcitonin, salmon, (MIACALCIN) 200 UNIT/ACT Solution  Patient Yes No   Sig: Spray 1 Spray in nose every bedtime.   carvedilol (COREG) 25 MG Tab  Patient Yes No   Sig: Take 25 mg by mouth 2 times a day, with meals.   cetirizine (KLS ALLER-HAWA) 10 MG Tab  Patient Yes No   Sig: Take 20 mg by mouth 2 times a day.   cevimeline (EVOXAC) 30 MG capsule  Patient No No   Sig: Take 1 Cap by mouth 3 times a day.   colesevelam (WELCHOL) 625 MG Tab  Patient Yes No   Sig: Take 625 mg by mouth 3 times a day, with meals.   estradiol (ESTRACE) 0.5 MG tablet  Patient Yes No   Sig: Take 0.5 mg by mouth every day.   famotidine (PEPCID) 40 MG Tab  Patient Yes No   Sig: Take 40 mg by mouth every bedtime.   furosemide (LASIX) 20 MG Tab  Patient No No   Sig: TAKE 0.5 TABS BY MOUTH 2 TIMES A DAY.   gabapentin (NEURONTIN) 400 MG Cap  Patient No No   Sig: Take 1 Cap by mouth 3 times a day.   liothyronine (CYTOMEL) 25 MCG Tab  Patient Yes No   Sig: Take 25 mcg by mouth every day.   losartan (COZAAR) 100 MG Tab  Patient No No   Sig: Take 1 Tab by mouth every day.   magnesium oxide (MAG-OX) 400 MG Tab  Patient Yes No   Sig: Take 400 mg by mouth every evening.   meloxicam (MOBIC) 15 MG tablet  Patient Yes No   Sig: Take 15 mg by mouth every evening.   montelukast (SINGULAIR) 10 MG Tab  Patient Yes No   Sig: Take 10 mg by mouth every evening.   multivitamin (THERAGRAN) Tab  Patient Yes No   Sig: Take 1 Tab by mouth every day.   pantoprazole (PROTONIX) 40 MG Tablet Delayed Response  Patient Yes No   Sig: Take 40 mg by mouth every evening.   potassium chloride SA (KDUR) 20 MEQ Tab CR  Patient No No   Sig: Take 1 Tab by mouth every day.   spironolactone (ALDACTONE) 25 MG Tab  Patient Yes No   Sig: Take 25 mg by mouth every day.   tizanidine  (ZANAFLEX) 4 MG Tab  Patient Yes No   Sig: Take 2-4 mg by mouth every 6 hours as needed. Indications: Muscle Spasticity      Facility-Administered Medications: None       Physical Exam  Temp:  [36.4 °C (97.6 °F)] 36.4 °C (97.6 °F)  Pulse:  [] 121  Resp:  [12-21] 20  BP: (113-195)/() 113/86  SpO2:  [95 %-97 %] 97 %    Physical Exam  Constitutional:       General: She is not in acute distress.     Appearance: Normal appearance. She is obese. She is not ill-appearing, toxic-appearing or diaphoretic.   HENT:      Head: Normocephalic.      Right Ear: External ear normal.      Left Ear: External ear normal.   Eyes:      Extraocular Movements: Extraocular movements intact.      Conjunctiva/sclera: Conjunctivae normal.      Pupils: Pupils are equal, round, and reactive to light.   Cardiovascular:      Rate and Rhythm: Normal rate and regular rhythm.   Pulmonary:      Effort: Pulmonary effort is normal. No respiratory distress.      Breath sounds: No stridor. Wheezing present. No rhonchi or rales.      Comments: Mild end expiratory wheezes in all lung fields  Chest:      Chest wall: No tenderness.   Abdominal:      General: There is no distension.      Palpations: Abdomen is soft.      Tenderness: There is no abdominal tenderness.   Musculoskeletal:      Left lower leg: No edema.      Comments: Right lower extremity in walking boot   Skin:     General: Skin is warm and dry.   Neurological:      General: No focal deficit present.      Mental Status: She is alert and oriented to person, place, and time. Mental status is at baseline.      Cranial Nerves: No cranial nerve deficit.      Motor: No weakness.   Psychiatric:         Mood and Affect: Mood normal.         Laboratory:  Recent Labs     03/16/20  1550   WBC 7.4   RBC 5.23   HEMOGLOBIN 13.4   HEMATOCRIT 43.9   MCV 83.9   MCH 25.6*   MCHC 30.5*   RDW 45.9   PLATELETCT 347   MPV 10.3     Recent Labs     03/16/20  1550   SODIUM 137   POTASSIUM 4.8   CHLORIDE 103  "  CO2 17*   GLUCOSE 128*   BUN 18   CREATININE 1.22   CALCIUM 9.1     Recent Labs     03/16/20  1550   ALTSGPT 29   ASTSGOT 37   ALKPHOSPHAT 99   TBILIRUBIN 0.2   GLUCOSE 128*         Recent Labs     03/16/20  1550   NTPROBNP 120         No results for input(s): TROPONINT in the last 72 hours.    Urinalysis:    Recent Labs     03/16/20  1708   SPECGRAVITY <=1.005   GLUCOSEUR >=1000*   KETONES Negative   NITRITE Negative   LEUKESTERAS Negative        Imaging:  DX-CHEST-PORTABLE (1 VIEW)   Final Result      No evidence of acute cardiopulmonary process.            Assessment/Plan:  I anticipate this patient is appropriate for observation status at this time.    Thyroid disease  Assessment & Plan  Patient takes both Cytomel as well as levothyroxine when I asked her about this she stated that her endocrinologist, Dr. Canales-\"wants to turn her thyroid off at night and replace it in the morning\"  Given tachycardia checking TSH before ordering either medication.    Tachycardia  Assessment & Plan  IVF,  Titrate Coreg, check TSH  (thyroid status unclear)    Chronic cough- (present on admission)  Assessment & Plan  With acute sinusitis- failed OP Tx  Last Cx MSSA January  Imaging ? Atelectasis R base- check procalcitonin  Multiple antibiotic allergies  Aztreonam and Vanc for now    Takotsubo syndrome- (present on admission)  Assessment & Plan  H/o EF 20, latest 75- now w/ CP BNP OK  Check echo    CKD (chronic kidney disease), stage III (HCC)- (present on admission)  Assessment & Plan  Slightly worse than baseline- monitor    Multiple allergies- (present on admission)  Assessment & Plan  But then sheMultiple antibiotic allergies  Antibiotics per pharmacy      Fibromyalgia- (present on admission)  Assessment & Plan  Continue home meds       VTE prophylaxis: SCDs  "

## 2020-03-17 NOTE — PROGRESS NOTES
2 RN skin check complete.   Devices in place tele.  Skin assessed under devices intact.  Confirmed pressure ulcers found on n/a.  New potential pressure ulcers noted on n/a. Wound consult placed n/a.  The following interventions in place patient turns self side to side, pillows in use for support positioning.     Skin WNL, c/d/i

## 2020-03-17 NOTE — ASSESSMENT & PLAN NOTE
With acute sinusitis- failed OP Tx  Last Cx MSSA January  Imaging ? Atelectasis R base- check procalcitonin  Multiple antibiotic allergies  Aztreonam and Vanc for now

## 2020-03-17 NOTE — PROGRESS NOTES
Telemetry Strip       Measurements from am strip were as follows:  Rhythm: SR   HR:73-86  Measurements: 0.20/ 0.10/ 0.40  Ectopy: none             Normal Values  Rhythm SR  HR Range    Measurements 0.12-0.20 / 0.06-0.10  / 0.30-0.52

## 2020-03-17 NOTE — PROGRESS NOTES
Pt arrived to unit via gurney. Ambulated from gurney to bed, no assistance required. Tele monitor applied, vitals taken. Pt assessed. A&O x4. Admit profile and med rec complete. Discussed POC with pt, including echo, IVF, continuous cardiac monitoring. Welcome folder provided and discussed. Communication board filled out. Questions and concerns addressed, verbalized understanding. Fall precautions in place. Pt demonstrates ability to use call light appropriately. Pt left in lowest position. Bed locked and low.

## 2020-03-17 NOTE — ED PROVIDER NOTES
ED Provider Note    CHIEF COMPLAINT  Chief Complaint   Patient presents with   • Cough     x 3 days   • Shortness of Breath     x 3 days    • Hypertension     185/138       HPI  Donna Isaac is a 54 y.o. female who presents to the emergency room today with shortness of breath productive cough with green phlegm fevers at home history symptoms present for over a month.  History of congestive heart failure in the past, from nausea, GERD, hypertension.  Ejection fraction apparently of 20%.  Patient states that symptoms are becoming worse prompting her to come to the emergency room.    REVIEW OF SYSTEMS  See HPI for further details. All other systems are negative.     PAST MEDICAL HISTORY  Past Medical History:   Diagnosis Date   • Asthma    • Chronic pain    • Congestive heart failure ([facility])     Cardiologist, Dr. Carlson   • Fibromyalgia    • GERD (gastroesophageal reflux disease)    • Hypertension    • Migraine    • Osteoporosis    • Renal disorder     CKD   • Seizure ([facility])    • TBI (traumatic brain injury) ([facility])    • Urticaria        FAMILY HISTORY  [unfilled]    SOCIAL HISTORY  Social History     Socioeconomic History   • Marital status:      Spouse name: Not on file   • Number of children: Not on file   • Years of education: Not on file   • Highest education level: Not on file   Occupational History   • Not on file   Social Needs   • Financial resource strain: Not on file   • Food insecurity     Worry: Never true     Inability: Never true   • Transportation needs     Medical: No     Non-medical: No   Tobacco Use   • Smoking status: Never Smoker   • Smokeless tobacco: Never Used   Substance and Sexual Activity   • Alcohol use: Not Currently   • Drug use: No   • Sexual activity: Not on file   Lifestyle   • Physical activity     Days per week: Not on file     Minutes per session: Not on file   • Stress: Not on file   Relationships   • Social connections     Talks on phone: Not on file     Gets  together: Not on file     Attends Gnosticism service: Not on file     Active member of club or organization: Not on file     Attends meetings of clubs or organizations: Not on file     Relationship status: Not on file   • Intimate partner violence     Fear of current or ex partner: Not on file     Emotionally abused: Not on file     Physically abused: Not on file     Forced sexual activity: Not on file   Other Topics Concern   • Not on file   Social History Narrative   • Not on file       SURGICAL HISTORY  Past Surgical History:   Procedure Laterality Date   • SHOULDER DECOMPRESSION ARTHROSCOPIC Left 2/19/2019    Procedure: SHOULDER DECOMPRESSION ARTHROSCOPIC - SUBACROMIAL;  Surgeon: Camila Elkins M.D.;  Location: [facility];  Service: Orthopedics   • CLAVICLE DISTAL EXCISION Left 2/19/2019    Procedure: CLAVICLE DISTAL EXCISION;  Surgeon: Camila Elkins M.D.;  Location: [facility];  Service: Orthopedics   • SHOULDER ARTHROSCOPY W/ BICIPITAL TENODESIS REPAIR Left 2/19/2019    Procedure: SHOULDER ARTHROSCOPY W/ BICIPITAL TENODESIS REPAIR;  Surgeon: Camila Elkins M.D.;  Location: [facility];  Service: Orthopedics   • GASTROSCOPY N/A 2/7/2019    Procedure: GASTROSCOPY- DIALATION AND BIOPSY;  Surgeon: Hola Veliz M.D.;  Location: [facility];  Service: Gastroenterology   • ABDOMINAL EXPLORATION  2002   • GASTRIC BYPASS LAPAROSCOPIC  1999   • HYSTERECTOMY, TOTAL ABDOMINAL  1995   • APPENDECTOMY  1974       CURRENT MEDICATIONS  Home Medications    **Home medications have not yet been reviewed for this encounter**         ALLERGIES  Allergies   Allergen Reactions   • Bactrim [Sulfamethoxazole W-Trimethoprim] Shortness of Breath   • Bee Venom Anaphylaxis   • Buprenorphine Anaphylaxis   • Doxycycline Hives and Shortness of Breath   • Econazole Anaphylaxis   • Floxin [Ofloxacin] Anaphylaxis, Shortness of Breath and Swelling     Cause throat swelling  "and difficulty breathing   • Gadolinium Derivatives Hives and Swelling     Throat swelling   • Iodine Shortness of Breath and Anaphylaxis     Throat and tongue swelling with IV contrast   • Keflex Shortness of Breath   • Levaquin Shortness of Breath     anaphlyaxis   • Levofloxacin Shortness of Breath   • Morphine Anaphylaxis   • Naloxone Hives     \"hives, SOB\"   • Nitrofurantoin Shortness of Breath     ...and hives     • Norco [Apap-Fd&C Yellow #10 [place]-Hydrocodone] Shortness of Breath     ...and hives     • Oxycodone Shortness of Breath     ...and hives     • Penicillins Shortness of Breath     ...and hives     • Tape Contact Dermatitis     Blisters, clear tegaderm ok.. No steristrips.   • Ancef [Cefazolin] Hives   • Bextra [Valdecoxib] Rash     \"Skin burn and peeling.\"   • Flagyl [Kdc:Metronidazole+Tartrazine] Hives   • Linezolid Rash     Rash all over body   • Other Drug Hives     \"Enconazole nitrate.\" shortness of breath and hives   • Other Misc Unspecified     Adhesive tape: blisters, skin peels off   • Clindamycin      HIVES     • Tygacil [Tigecycline]      itching   • Zithromax [Azithromycin] Hives and Shortness of Breath     Pt had Hives also       PHYSICAL EXAM  VITAL SIGNS: /107   Pulse (!) 120   Temp 36.4 °C (97.6 °F) (Temporal)   Resp 18   Ht 1.626 m (5' 4\")   Wt 88.2 kg (194 lb 7.1 oz)   LMP 02/07/2016 (Within Months)   SpO2 97%   BMI 33.38 kg/m²       Constitutional: Well developed, Well nourished,  acute distress, Non-toxic appearance.   HENT: Normocephalic, Atraumatic, Bilateral external ears normal, Oropharynx moist, No oral exudates, Nose normal.   Eyes: PERRLA, EOMI, Conjunctiva normal, No discharge.   Neck: Normal range of motion, No tenderness, Supple, No stridor.   Lymphatic: No lymphadenopathy noted.   Cardiovascular: Normal heart rate, Normal rhythm, No murmurs, No rubs, No gallops.   Thorax & Lungs: Diminished breath sounds, No respiratory distress, No wheezing, No chest " tenderness.   Abdomen: Bowel sounds normal, Soft, No tenderness, No masses, No pulsatile masses.   Skin: Warm, Dry, No erythema, No rash.   Back: No tenderness, No CVA tenderness.    Extremities: Intact distal pulses, No edema, No tenderness, No cyanosis, No clubbing.   Musculoskeletal: Good range of motion in all major joints. No tenderness to palpation or major deformities noted.   Neurologic: Alert & oriented x 3, Normal motor function, Normal sensory function, No focal deficits noted.   Psychiatric: Affect normal, Judgment normal, Mood normal.     EKG  Sinus tachycardia 120 bpm, no ST elevation or depression, no widening of the QRS complex, poor R wave progression, AK interval's are normal, this is a 12-lead EKG no previous EKG available to staff comparison    RADIOLOGY/PROCEDURES  DX-CHEST-PORTABLE (1 VIEW)   Final Result      No evidence of acute cardiopulmonary process.            COURSE & MEDICAL DECISION MAKING  Pertinent Labs & Imaging studies reviewed. (See chart for details)  Patient has elevated lactic acid was suspected infection possible bronchial started on antibiotics discussed case with pharmacist as she has many allergies.  She is cautious given fluids because she has ejection fraction of 20% her heart rate has come down her blood pressure initially 210/142 has come down with hydralazine IV as well as enalapril currently at 154/107 she was admitted to hospital as above discussed case with hospitalist.    FINAL IMPRESSION  1.  Elevated lactic acid  2.  Lower respiratory infection  3.  Sinus tachycardia  4.  Hypertension         Electronically signed by: Librado Prescott D.O., 3/16/2020 5:53 PM

## 2020-03-17 NOTE — PROGRESS NOTES
"Pharmacy Kinetics 54 y.o. female on vancomycin day # 1 3/16/2020    Currently on Vancomycin 2250 mg iv loading dose ordered  Provider specified end date: to be determined    Indication for Treatment: Pneumonia    Pertinent history per medical record: Admitted on 3/16/2020 for recurrent sinusitis and cough.  CXR atelectasis R base  Due to multiple medication allergies, vancomycin + aztreonam    Other antibiotics: Aztreonam 1 gm IV Q8H    Allergies: Bactrim [sulfamethoxazole w-trimethoprim]; Bee venom; Buprenorphine; Doxycycline; Econazole; Floxin [ofloxacin]; Gadolinium derivatives; Iodine; Keflex; Levaquin; Levofloxacin; Morphine; Naloxone; Nitrofurantoin; Norco [apap-fd&c yellow #10 [place]-hydrocodone]; Oxycodone; Penicillins; Tape; Ancef [cefazolin]; Bextra [valdecoxib]; Flagyl [kdc:metronidazole+tartrazine]; Linezolid; Other drug; Other misc; Clindamycin; Tygacil [tigecycline]; and Zithromax [azithromycin]     List concerns for renal function:  hypermetabolic state (SIRS), pressors/hypotension, nephrotoxic drugs, etc.): SCr elevated at 1.22 from baseline approx 1    Pertinent cultures to date:   Sputum culture ordered  BCp x 2 ordered    MRSA nares swab if pneumonia is a concern (ordered/positive/negative/n-a): ordered    Recent Labs     20  1550   WBC 7.4   NEUTSPOLYS 61.30     Recent Labs     20  1550   BUN 18   CREATININE 1.22   ALBUMIN 4.1     No results for input(s): VANCOTROUGH, VANCOPEAK, REMINGTON in the last 72 hours.    Intake/Output Summary (Last 24 hours) at 3/16/2020 1905  Last data filed at 3/16/2020 1859  Gross per 24 hour   Intake 1100 ml   Output --   Net 1100 ml      /86   Pulse (!) 126   Temp 36.4 °C (97.6 °F) (Temporal)   Resp 17   Ht 1.626 m (5' 4\")   Wt 88.2 kg (194 lb 7.1 oz)   SpO2 97%  Temp (24hrs), Av.4 °C (97.6 °F), Min:36.4 °C (97.6 °F), Max:36.4 °C (97.6 °F)      A/P   1. Vancomycin dose 2250 mg loading dose followed by 1250 mg IV Q12H  2. Next " vancomycin level: prior to 4th dose  3. Goal trough: 16 - 20 mcg/ml    ALMA Mccloud PharmD

## 2020-03-17 NOTE — CARE PLAN
Problem: Infection  Goal: Will remain free from infection  Outcome: PROGRESSING AS EXPECTED  Note: Educated pt on importance of hand hygiene before meals and after using bathroom. Pt verbalized understainding     Problem: Respiratory:  Goal: Respiratory status will improve  Outcome: PROGRESSING AS EXPECTED  Note: Pt stated no feelings of sob, pt has non productive cough. Pt is on room air. Encouraged deep breath and cough

## 2020-03-17 NOTE — DISCHARGE INSTRUCTIONS
Discharge Instructions per Kristin Kurtz M.D.    Follow up with ENT  Continue sinus rinses    DIET: regular    ACTIVITY: regular    DIAGNOSIS: chronic sinusitis    Return to ER if fever      Discharge Instructions    Discharged to home by car with self. Discharged via wheelchair, hospital escort: Yes.  Special equipment needed: none    Be sure to schedule a follow-up appointment with your primary care doctor or any specialists as instructed.     Discharge Plan:   Diet Plan: Discussed  Activity Level: Discussed  Confirmed Follow up Appointment: Appointment Scheduled  Confirmed Symptoms Management: Discussed  Medication Reconciliation Updated: Yes  Influenza Vaccine Indication: Not indicated: Previously immunized this influenza season and > 8 years of age    I understand that a diet low in cholesterol, fat, and sodium is recommended for good health. Unless I have been given specific instructions below for another diet, I accept this instruction as my diet prescription.   Other diet: regular    Special Instructions: None    · Is patient discharged on Warfarin / Coumadin?   No     Depression / Suicide Risk    As you are discharged from this [facility] facility, it is important to learn how to keep safe from harming yourself.    Recognize the warning signs:  · Abrupt changes in personality, positive or negative- including increase in energy   · Giving away possessions  · Change in eating patterns- significant weight changes-  positive or negative  · Change in sleeping patterns- unable to sleep or sleeping all the time   · Unwillingness or inability to communicate  · Depression  · Unusual sadness, discouragement and loneliness  · Talk of wanting to die  · Neglect of personal appearance   · Rebelliousness- reckless behavior  · Withdrawal from people/activities they love  · Confusion- inability to concentrate     If you or a loved one observes any of these behaviors or has concerns about self-harm, here's what  you can do:  · Talk about it- your feelings and reasons for harming yourself  · Remove any means that you might use to hurt yourself (examples: pills, rope, extension cords, firearm)  · Get professional help from the community (Mental Health, Substance Abuse, psychological counseling)  · Do not be alone:Call your Safe Contact- someone whom you trust who will be there for you.  · Call your local CRISIS HOTLINE 249-4188 or 320-626-5118  · Call your local Children's Mobile Crisis Response Team [place] (755) 290-4240 or [employer]  · Call the toll free National Suicide Prevention Hotlines   · National Suicide Prevention Lifeline 633-385-DIOK (3719)  · LoveIt Line Network 800-SUICIDE (190-1971)            Sinusitis, Adult  Sinusitis is soreness and inflammation of your sinuses. Sinuses are hollow spaces in the bones around your face. Your sinuses are located:  · Around your eyes.  · In the middle of your forehead.  · Behind your nose.  · In your cheekbones.  Your sinuses and nasal passages are lined with a stringy fluid (mucus). Mucus normally drains out of your sinuses. When your nasal tissues become inflamed or swollen, the mucus can become trapped or blocked so air cannot flow through your sinuses. This allows bacteria, viruses, and funguses to grow, which leads to infection.  Sinusitis can develop quickly and last for 7?10 days (acute) or for more than 12 weeks (chronic). Sinusitis often develops after a cold.  What are the causes?  This condition is caused by anything that creates swelling in the sinuses or stops mucus from draining, including:  · Allergies.  · Asthma.  · Bacterial or viral infection.  · Abnormally shaped bones between the nasal passages.  · Nasal growths that contain mucus (nasal polyps).  · Narrow sinus openings.  · Pollutants, such as chemicals or irritants in the air.  · A foreign object stuck in the nose.  · A fungal infection. This is rare.  What increases the risk?  The  following factors may make you more likely to develop this condition:  · Having allergies or asthma.  · Having had a recent cold or respiratory tract infection.  · Having structural deformities or blockages in your nose or sinuses.  · Having a weak immune system.  · Doing a lot of swimming or diving.  · Overusing nasal sprays.  · Smoking.  What are the signs or symptoms?  The main symptoms of this condition are pain and a feeling of pressure around the affected sinuses. Other symptoms include:  · Upper toothache.  · Earache.  · Headache.  · Bad breath.  · Decreased sense of smell and taste.  · A cough that may get worse at night.  · Fatigue.  · Fever.  · Thick drainage from your nose. The drainage is often green and it may contain pus (purulent).  · Stuffy nose or congestion.  · Postnasal drip. This is when extra mucus collects in the throat or back of the nose.  · Swelling and warmth over the affected sinuses.  · Sore throat.  · Sensitivity to light.  How is this diagnosed?  This condition is diagnosed based on symptoms, a medical history, and a physical exam. To find out if your condition is acute or chronic, your health care provider may:  · Look in your nose for signs of nasal polyps.  · Tap over the affected sinus to check for signs of infection.  · View the inside of your sinuses using an imaging device that has a light attached (endoscope).  If your health care provider suspects that you have chronic sinusitis, you may also:  · Be tested for allergies.  · Have a sample of mucus taken from your nose (nasal culture) and checked for bacteria.  · Have a mucus sample examined to see if your sinusitis is related to an allergy.  If your sinusitis does not respond to treatment and it lasts longer than 8 weeks, you may have an MRI or CT scan to check your sinuses. These scans also help to determine how severe your infection is.  In rare cases, a bone biopsy may be done to rule out more serious types of fungal sinus  disease.  How is this treated?  Treatment for sinusitis depends on the cause and whether your condition is chronic or acute. If a virus is causing your sinusitis, your symptoms will go away on their own within 10 days. You may be given medicines to relieve your symptoms, including:  · Topical nasal decongestants. They shrink swollen nasal passages and let mucus drain from your sinuses.  · Antihistamines. These drugs block inflammation that is triggered by allergies. This can help to ease swelling in your nose and sinuses.  · Topical nasal corticosteroids. These are nasal sprays that ease inflammation and swelling in your nose and sinuses.  · Nasal saline washes. These rinses can help to get rid of thick mucus in your nose.  If your condition is caused by bacteria, you will be given an antibiotic medicine. If your condition is caused by a fungus, you will be given an antifungal medicine.  Surgery may be needed to correct underlying conditions, such as narrow nasal passages. Surgery may also be needed to remove polyps.  Follow these instructions at home:  Medicines  · Take, use, or apply over-the-counter and prescription medicines only as told by your health care provider. These may include nasal sprays.  · If you were prescribed an antibiotic medicine, take it as told by your health care provider. Do not stop taking the antibiotic even if you start to feel better.  Hydrate and Humidify  · Drink enough water to keep your urine clear or pale yellow. Staying hydrated will help to thin your mucus.  · Use a cool mist humidifier to keep the humidity level in your home above 50%.  · Inhale steam for 10-15 minutes, 3-4 times a day or as told by your health care provider. You can do this in the bathroom while a hot shower is running.  · Limit your exposure to cool or dry air.  Rest  · Rest as much as possible.  · Sleep with your head raised (elevated).  · Make sure to get enough sleep each night.  General  instructions  · Apply a warm, moist washcloth to your face 3-4 times a day or as told by your health care provider. This will help with discomfort.  · Wash your hands often with soap and water to reduce your exposure to viruses and other germs. If soap and water are not available, use hand .  · Do not smoke. Avoid being around people who are smoking (secondhand smoke).  · Keep all follow-up visits as told by your health care provider. This is important.  Contact a health care provider if:  · You have a fever.  · Your symptoms get worse.  · Your symptoms do not improve within 10 days.  Get help right away if:  · You have a severe headache.  · You have persistent vomiting.  · You have pain or swelling around your face or eyes.  · You have vision problems.  · You develop confusion.  · Your neck is stiff.  · You have trouble breathing.  This information is not intended to replace advice given to you by your health care provider. Make sure you discuss any questions you have with your health care provider.  Document Released: 12/18/2006 Document Revised: 08/13/2017 Document Reviewed: 10/12/2016  [employer] Interactive Patient Education © 2017 [employer]

## 2020-03-26 ENCOUNTER — TELEPHONE (OUTPATIENT)
Dept: CARDIOLOGY | Facility: MEDICAL CENTER | Age: 55
End: 2020-03-26

## 2020-03-27 NOTE — TELEPHONE ENCOUNTER
Non-Urgent Medical Question   Brendan Anderson Ass't routed conversation to You 1 hour ago (3:37 PM)      Donna Isaac Chanwit, M.D. 1 hour ago (3:21 PM)         I’m not feeling well and I am having a hard time controlling my blood pressure. I have to take the prn med every day three to four times a day. My last one was 189/148     MyChart message received.    Pt concerns relayed to MD for advise.

## 2020-03-31 ENCOUNTER — NON-PROVIDER VISIT (OUTPATIENT)
Dept: VASCULAR LAB | Facility: MEDICAL CENTER | Age: 55
End: 2020-03-31
Attending: INTERNAL MEDICINE
Payer: MEDICARE

## 2020-03-31 PROCEDURE — 93788 AMBL BP MNTR W/SW A/R: CPT

## 2020-03-31 PROCEDURE — 93786 AMBL BP MNTR W/SW REC ONLY: CPT

## 2020-03-31 PROCEDURE — 93790 AMBL BP MNTR W/SW I&R: CPT | Performed by: INTERNAL MEDICINE

## 2020-03-31 NOTE — TELEPHONE ENCOUNTER
Patient Call   Adriana Carlson M.D.  You 1 hour ago (8:30 AM)      Need a little more specific info   Multiple admissions for respiratory infection type symptoms since last visit in January   BP diastolic ?140   Need more readings   Has she been to HTN clinic?   Sounds like she needs appointment ASAP    Routing comment      MyChart message sent to pt regarding MD recommendations.      Will await for pt response.

## 2020-04-02 ENCOUNTER — TELEPHONE (OUTPATIENT)
Dept: VASCULAR LAB | Facility: MEDICAL CENTER | Age: 55
End: 2020-04-02

## 2020-04-02 NOTE — TELEPHONE ENCOUNTER
Spoke to pt about Dr. Bloch has reviewed her blood pressure monitor.  It was generally pretty high with blood pressures 160/100 percent but she did have a fall blood pressure in the early morning hours.  For now would like to keep her on the same medications and we can discuss further at follow-up visit.

## 2020-04-02 NOTE — PROGRESS NOTES
Ambulatory blood pressure monitor results reviewed  Full report under media tab  Reasonable data acquisition    Mean daytime: 164/101 consistent with poorly controlled mixed systolic and diastolic out of office blood pressure    Nocturnal dip: Appears to be present based upon the curves but not on the averages    Did have a precipitous fall in blood pressure during early morning hours without increase in heart rate    Clinical correlation needed.  We will discuss further at follow-up visit    Michael Bloch, MD  [facility]

## 2020-04-03 ENCOUNTER — HOSPITAL ENCOUNTER (OUTPATIENT)
Dept: LAB | Facility: MEDICAL CENTER | Age: 55
End: 2020-04-03
Attending: INTERNAL MEDICINE
Payer: MEDICARE

## 2020-04-03 DIAGNOSIS — I10 ESSENTIAL HYPERTENSION: ICD-10-CM

## 2020-04-03 LAB
ALBUMIN SERPL BCP-MCNC: 4.5 G/DL (ref 3.2–4.9)
ALBUMIN/GLOB SERPL: 1.6 G/DL
ALP SERPL-CCNC: 88 U/L (ref 30–99)
ALT SERPL-CCNC: 28 U/L (ref 2–50)
ANION GAP SERPL CALC-SCNC: 16 MMOL/L (ref 7–16)
ANION GAP SERPL CALC-SCNC: 18 MMOL/L (ref 7–16)
APPEARANCE UR: CLEAR
AST SERPL-CCNC: 28 U/L (ref 12–45)
BILIRUB SERPL-MCNC: 0.2 MG/DL (ref 0.1–1.5)
BILIRUB UR QL STRIP.AUTO: NEGATIVE
BUN SERPL-MCNC: 10 MG/DL (ref 8–22)
BUN SERPL-MCNC: 11 MG/DL (ref 8–22)
CALCIUM SERPL-MCNC: 9.3 MG/DL (ref 8.5–10.5)
CALCIUM SERPL-MCNC: 9.3 MG/DL (ref 8.5–10.5)
CHLORIDE SERPL-SCNC: 98 MMOL/L (ref 96–112)
CHLORIDE SERPL-SCNC: 99 MMOL/L (ref 96–112)
CO2 SERPL-SCNC: 21 MMOL/L (ref 20–33)
CO2 SERPL-SCNC: 21 MMOL/L (ref 20–33)
COLOR UR: YELLOW
CORTIS SERPL-MCNC: 0.4 UG/DL (ref 0–23)
CREAT SERPL-MCNC: 1.03 MG/DL (ref 0.5–1.4)
CREAT SERPL-MCNC: 1.04 MG/DL (ref 0.5–1.4)
CREAT UR-MCNC: 40.01 MG/DL
CREAT UR-MCNC: 40.13 MG/DL
GLOBULIN SER CALC-MCNC: 2.8 G/DL (ref 1.9–3.5)
GLUCOSE SERPL-MCNC: 101 MG/DL (ref 65–99)
GLUCOSE SERPL-MCNC: 97 MG/DL (ref 65–99)
GLUCOSE UR STRIP.AUTO-MCNC: 500 MG/DL
KETONES UR STRIP.AUTO-MCNC: NEGATIVE MG/DL
LEUKOCYTE ESTERASE UR QL STRIP.AUTO: NEGATIVE
MICRO URNS: ABNORMAL
MICROALBUMIN UR-MCNC: <1.2 MG/DL
MICROALBUMIN/CREAT UR: NORMAL MG/G (ref 0–30)
NITRITE UR QL STRIP.AUTO: NEGATIVE
PH UR STRIP.AUTO: 5.5 [PH] (ref 5–8)
PHOSPHATE SERPL-MCNC: 3.5 MG/DL (ref 2.5–4.5)
POTASSIUM SERPL-SCNC: 3.9 MMOL/L (ref 3.6–5.5)
POTASSIUM SERPL-SCNC: 4 MMOL/L (ref 3.6–5.5)
POTASSIUM UR-SCNC: 27 MMOL/L
PROT SERPL-MCNC: 7.3 G/DL (ref 6–8.2)
PROT UR QL STRIP: NEGATIVE MG/DL
RBC UR QL AUTO: NEGATIVE
SODIUM SERPL-SCNC: 136 MMOL/L (ref 135–145)
SODIUM SERPL-SCNC: 137 MMOL/L (ref 135–145)
SODIUM UR-SCNC: 98 MMOL/L
SP GR UR STRIP.AUTO: 1.01
UROBILINOGEN UR STRIP.AUTO-MCNC: 0.2 MG/DL

## 2020-04-03 PROCEDURE — 82340 ASSAY OF CALCIUM IN URINE: CPT

## 2020-04-03 PROCEDURE — 82570 ASSAY OF URINE CREATININE: CPT

## 2020-04-03 PROCEDURE — 80053 COMPREHEN METABOLIC PANEL: CPT

## 2020-04-03 PROCEDURE — 82043 UR ALBUMIN QUANTITATIVE: CPT

## 2020-04-03 PROCEDURE — 84133 ASSAY OF URINE POTASSIUM: CPT

## 2020-04-03 PROCEDURE — 84105 ASSAY OF URINE PHOSPHORUS: CPT

## 2020-04-03 PROCEDURE — 81003 URINALYSIS AUTO W/O SCOPE: CPT

## 2020-04-03 PROCEDURE — 84244 ASSAY OF RENIN: CPT

## 2020-04-03 PROCEDURE — 82533 TOTAL CORTISOL: CPT

## 2020-04-03 PROCEDURE — 82570 ASSAY OF URINE CREATININE: CPT | Mod: 91

## 2020-04-03 PROCEDURE — 82088 ASSAY OF ALDOSTERONE: CPT | Mod: 91

## 2020-04-03 PROCEDURE — 84300 ASSAY OF URINE SODIUM: CPT

## 2020-04-03 PROCEDURE — 83835 ASSAY OF METANEPHRINES: CPT

## 2020-04-03 PROCEDURE — 36415 COLL VENOUS BLD VENIPUNCTURE: CPT

## 2020-04-03 PROCEDURE — 82088 ASSAY OF ALDOSTERONE: CPT

## 2020-04-03 PROCEDURE — 84100 ASSAY OF PHOSPHORUS: CPT

## 2020-04-03 PROCEDURE — 80048 BASIC METABOLIC PNL TOTAL CA: CPT

## 2020-04-05 LAB
ALDOST SERPL-MCNC: 10.9 NG/DL
ALDOST SERPL-MCNC: 11.6 NG/DL
CALCIUM 24H UR-MCNC: 4.3 MG/DL
CALCIUM 24H UR-MRATE: NORMAL MG/D
CALCIUM/CREAT 24H UR: 116 MG/G (ref 20–300)
COLLECT DURATION TIME SPEC: NORMAL HRS
COLLECT DURATION TIME SPEC: NORMAL HRS
CREAT 24H UR-MCNC: 37 MG/DL
CREAT 24H UR-MRATE: NORMAL MG/D (ref 500–1400)
PHOSPHATE 24H UR-MCNC: 20 MG/DL
PHOSPHATE 24H UR-MRATE: NORMAL MG/D (ref 400–1300)
PHOSPHATE/CREAT 24H UR: 541 MG/G
SPECIMEN VOL ?TM UR: NORMAL ML
TOTAL VOLUME 1105: NORMAL ML

## 2020-04-06 LAB — RENIN PLAS-CCNC: 5.5 NG/ML/HR

## 2020-04-07 ENCOUNTER — OFFICE VISIT (OUTPATIENT)
Dept: VASCULAR LAB | Facility: MEDICAL CENTER | Age: 55
End: 2020-04-07
Attending: INTERNAL MEDICINE
Payer: MEDICARE

## 2020-04-07 VITALS
HEIGHT: 64 IN | HEART RATE: 91 BPM | SYSTOLIC BLOOD PRESSURE: 139 MMHG | WEIGHT: 193.6 LBS | DIASTOLIC BLOOD PRESSURE: 91 MMHG | BODY MASS INDEX: 33.05 KG/M2

## 2020-04-07 DIAGNOSIS — Z88.9 MULTIPLE ALLERGIES: ICD-10-CM

## 2020-04-07 DIAGNOSIS — I10 ESSENTIAL HYPERTENSION: ICD-10-CM

## 2020-04-07 DIAGNOSIS — I51.81 TAKOTSUBO SYNDROME: ICD-10-CM

## 2020-04-07 DIAGNOSIS — E03.9 HYPOTHYROIDISM, UNSPECIFIED TYPE: ICD-10-CM

## 2020-04-07 PROCEDURE — 99213 OFFICE O/P EST LOW 20 MIN: CPT | Performed by: INTERNAL MEDICINE

## 2020-04-07 PROCEDURE — 99212 OFFICE O/P EST SF 10 MIN: CPT

## 2020-04-07 RX ORDER — HYDROXYZINE HYDROCHLORIDE 10 MG/1
10 TABLET, FILM COATED ORAL 3 TIMES DAILY PRN
Qty: 30 TAB | Refills: 0 | Status: SHIPPED | OUTPATIENT
Start: 2020-04-07 | End: 2020-09-28

## 2020-04-07 RX ORDER — DOXAZOSIN 2 MG/1
2 TABLET ORAL DAILY
Qty: 30 TAB | Refills: 11 | Status: SHIPPED | OUTPATIENT
Start: 2020-04-07 | End: 2020-04-07

## 2020-04-07 RX ORDER — HYDRALAZINE HYDROCHLORIDE 10 MG/1
10 TABLET, FILM COATED ORAL 3 TIMES DAILY
Qty: 90 TAB | Refills: 3 | Status: SHIPPED | OUTPATIENT
Start: 2020-04-07 | End: 2020-04-07

## 2020-04-07 ASSESSMENT — ENCOUNTER SYMPTOMS
FOCAL WEAKNESS: 0
DIZZINESS: 0
SENSORY CHANGE: 0
DEPRESSION: 0
PALPITATIONS: 0
COUGH: 0
SHORTNESS OF BREATH: 0
SEIZURES: 0
HEADACHES: 1
BACK PAIN: 1
NERVOUS/ANXIOUS: 0
SPEECH CHANGE: 0
WEIGHT LOSS: 0

## 2020-04-07 ASSESSMENT — FIBROSIS 4 INDEX: FIB4 SCORE: 0.84

## 2020-04-07 NOTE — PROGRESS NOTES
"  Follow Up VASCULAR VISIT  Subjective:   Donna Isaac is a 54 y.o. female who presents today 4-7-20 for   Chief Complaint   Patient presents with   • Follow-Up     HPI:  Here for f/u of htn in settting of takasubo's cm, thyroid disease, and dm.  Patient was back in the office with sinus infection.  Unfortunately her sinus surgery has been canceled.  She continues to have significant headaches.  Her allergies are starting to bother her again.  She says her blood pressures have been mostly in the 130s over 80s or higher.  She has been taking hydralazine 2-3 times a day.  She did have a 24-hour ambulatory blood pressure monitor.  She describes good adherence with her other medications.  She has had no lightheadedness  No shortness of breath  She did have an echo and a 24-hour blood pressure monitor  She has not yet had her renal artery duplex    Social History     Tobacco Use   Smoking Status Never Smoker   Smokeless Tobacco Never Used     DIET AND EXERCISE:  Weight Change: down a few pounds  Diet: common adult  Exercise: minimal exercise     Review of Systems   Constitutional: Negative for malaise/fatigue and weight loss.   Respiratory: Negative for cough and shortness of breath.    Cardiovascular: Negative for chest pain, palpitations and leg swelling.   Gastrointestinal: Negative for melena.   Musculoskeletal: Positive for back pain and joint pain.   Neurological: Positive for headaches. Negative for dizziness, sensory change, speech change, focal weakness and seizures.   Psychiatric/Behavioral: Negative for depression. The patient is not nervous/anxious.       Objective:     Vitals:    04/07/20 1140 04/07/20 1146   BP: 144/90 139/91   BP Location: Left arm Left arm   Patient Position: Sitting Sitting   BP Cuff Size: Adult Adult   Pulse: 92 91   Weight: 87.8 kg (193 lb 9.6 oz)    Height: 1.62 m (5' 3.78\")       Body mass index is 33.46 kg/m².  Physical Exam  Vitals signs reviewed.   Constitutional:       " General: She is not in acute distress.     Appearance: She is well-developed. She is not diaphoretic.   HENT:      Head: Normocephalic and atraumatic.   Eyes:      Conjunctiva/sclera: Conjunctivae normal.      Pupils: Pupils are equal, round, and reactive to light.      Comments: Does have some swelling around right eye   Neck:      Musculoskeletal: Normal range of motion and neck supple.      Thyroid: No thyromegaly.      Vascular: No JVD.   Pulmonary:      Effort: Pulmonary effort is normal. No respiratory distress.   Skin:     General: Skin is dry.      Coloration: Skin is not pale.      Findings: No erythema or rash.   Neurological:      Mental Status: She is alert and oriented to person, place, and time.      Cranial Nerves: No cranial nerve deficit.      Coordination: Coordination normal.      Deep Tendon Reflexes: Reflexes are normal and symmetric.       Lab Results   Component Value Date    CHOLSTRLTOT 193 08/26/2019    LDL 65 08/26/2019    HDL 92 08/26/2019    TRIGLYCERIDE 179 (H) 08/26/2019      Lab Results   Component Value Date    PROTHROMBTM 12.3 12/10/2019    INR 0.91 12/10/2019       Lab Results   Component Value Date    HBA1C 5.9 (H) 03/16/2020      Lab Results   Component Value Date    SODIUM 136 04/03/2020    SODIUM 137 04/03/2020    POTASSIUM 3.9 04/03/2020    POTASSIUM 4.0 04/03/2020    CHLORIDE 99 04/03/2020    CHLORIDE 98 04/03/2020    CO2 21 04/03/2020    CO2 21 04/03/2020    GLUCOSE 101 (H) 04/03/2020    GLUCOSE 97 04/03/2020    BUN 10 04/03/2020    BUN 11 04/03/2020    CREATININE 1.03 04/03/2020    CREATININE 1.04 04/03/2020    BUNCREATRAT 11 10/12/2018    IFAFRICA >60 04/03/2020    IFAFRICA >60 04/03/2020    IFNOTAFR 55 (A) 04/03/2020    IFNOTAFR 55 (A) 04/03/2020      Lab Results   Component Value Date    ALDOSTERONE 10.9 04/03/2020    ALDOSTERONE 11.6 04/03/2020    PRA - 5.5  Plasma mets - pending    Lab Results   Component Value Date    WBC 7.4 03/16/2020    RBC 5.23 03/16/2020     HEMOGLOBIN 13.4 03/16/2020    HEMATOCRIT 43.9 03/16/2020    MCV 83.9 03/16/2020    MCH 25.6 (L) 03/16/2020    MCHC 30.5 (L) 03/16/2020    MPV 10.3 03/16/2020      Echo march 2020  Normal left ventricular systolic function. Left ventricular ejection   fraction is visually estimated to be 70%.  Normal diastolic function.  Ascending aorta is dilated with a diameter of 4.3  cm.    ABPM March 2020  Ambulatory blood pressure monitor results reviewed  Full report under media tab  Reasonable data acquisition  Mean daytime: 164/101 consistent with poorly controlled mixed systolic and diastolic out of office blood pressure  Nocturnal dip: Appears to be present based upon the curves but not on the averages  Did have a precipitous fall in blood pressure during early morning hours without increase in heart rate    Medical Decision Making:  Today's Assessment / Status / Plan:     1. Takotsubo syndrome     2. Essential hypertension  doxazosin (CARDURA) 2 MG Tab    hydrALAZINE (APRESOLINE) 10 MG Tab   3. Hypothyroidism, unspecified type     4. Multiple allergies  hydrOXYzine HCl (ATARAX) 10 MG Tab     Patient Type: Primary Prevention    Etiology of Established CVD if Present: Recurrent stress-induced cardiomyopathy    Lipid Management: Qualifies for Statin Therapy Based on 2013 ACC/AHA Guidelines: no  Calculated 10-Year Risk of ASCVD: 2  Currently on Statin: No  Clean coronaries on catheterization by her report in the past  No clear indication for statin therapy  -Continue lifestyle modification    Blood Pressure Management:  ACC-AHA blood pressure goal less than 130/80  Reasonably long history of labile blood pressure  Elevated in the office  Remains elevated at home and on ambulatory blood pressure monitor  Given her history of stress-induced cardiomyopathy, I think this likely represents 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 cortisol is normal  Aldosterone renin ratio not consistent with primary aldosteronism  No albuminuria  Hopefully her sinus surgery and improvement in her headache syndrome may help her blood pressure  Plan:  -Continue home blood pressure monitoring  -Add doxazosin 1 mg each night -can increase as needed  -Continue other current medications for now  -Have given her Dr. Frankie Morley's book on pseudo-pheochromocytoma to read  -Await renal artery duplex  -Await results of plasma free metanephrines  -Defer decisions about whether not to continue dexamethasone to her endocrinologist    Glycemic Status: Normal    Anti-Platelet/Anti-Coagulant Tx: Not currently indicated    Smoking: Continue complete avoidance    Physical Activity: Defer recommendations to her orthopedic surgeon    Weight Management and Nutrition: Dietary plan was discussed with patient at this visit including Mediterranean-style diet low in sodium    Other:     1) stress-induced cardiomyopathy, recurrent -most recent ejection fraction is normal.  No signs or symptoms of heart failure on exam currently.  She has had a normal Holter monitor and cardiac cath by report  -Continue current neurohormonal therapy  -Defer any other further work-up and management to cardiology    2) hypothyroidism -defer management to endocrinology    3) sinus cyst and migraine headache -defer management to ENT and neurology    4) multiple orthopedic issues -defer management to orthopedics    5) fibromyalgia -defer management to neurology and PCP    Instructed to follow-up with PCP for remainder of adult medical needs: yes  We will partner with other providers in the management of established vascular disease and cardiometabolic risk factors.    Studies to Be Obtained: renal artery duplex    Labs to Be Obtained: None  currently    Follow up in:   1) patient to call in 1 week to report her blood pressures  2) follow-up in vascular medicine clinic in 1 month    Michael J Bloch, M.D.     Cc:  DOUGIE Quiñonez

## 2020-04-08 LAB
METANEPHS SERPL-SCNC: 0.1 NMOL/L (ref 0–0.49)
NORMETANEPHRINE SERPL-SCNC: 1.18 NMOL/L (ref 0–0.89)

## 2020-04-12 ENCOUNTER — HOSPITAL ENCOUNTER (EMERGENCY)
Facility: MEDICAL CENTER | Age: 55
End: 2020-04-12
Attending: EMERGENCY MEDICINE
Payer: MEDICARE

## 2020-04-12 VITALS
HEART RATE: 70 BPM | DIASTOLIC BLOOD PRESSURE: 89 MMHG | HEIGHT: 64 IN | BODY MASS INDEX: 33.42 KG/M2 | WEIGHT: 195.77 LBS | SYSTOLIC BLOOD PRESSURE: 164 MMHG | OXYGEN SATURATION: 94 % | RESPIRATION RATE: 18 BRPM | TEMPERATURE: 98.1 F

## 2020-04-12 DIAGNOSIS — G43.001 MIGRAINE WITHOUT AURA AND WITH STATUS MIGRAINOSUS, NOT INTRACTABLE: ICD-10-CM

## 2020-04-12 DIAGNOSIS — H53.9 VISUAL CHANGES: ICD-10-CM

## 2020-04-12 PROCEDURE — 700111 HCHG RX REV CODE 636 W/ 250 OVERRIDE (IP): Performed by: EMERGENCY MEDICINE

## 2020-04-12 PROCEDURE — 96375 TX/PRO/DX INJ NEW DRUG ADDON: CPT

## 2020-04-12 PROCEDURE — 96374 THER/PROPH/DIAG INJ IV PUSH: CPT

## 2020-04-12 PROCEDURE — 99284 EMERGENCY DEPT VISIT MOD MDM: CPT

## 2020-04-12 RX ORDER — METOCLOPRAMIDE HYDROCHLORIDE 5 MG/ML
10 INJECTION INTRAMUSCULAR; INTRAVENOUS ONCE
Status: COMPLETED | OUTPATIENT
Start: 2020-04-12 | End: 2020-04-12

## 2020-04-12 RX ORDER — KETOROLAC TROMETHAMINE 30 MG/ML
15 INJECTION, SOLUTION INTRAMUSCULAR; INTRAVENOUS ONCE
Status: COMPLETED | OUTPATIENT
Start: 2020-04-12 | End: 2020-04-12

## 2020-04-12 RX ORDER — DEXAMETHASONE SODIUM PHOSPHATE 4 MG/ML
8 INJECTION, SOLUTION INTRA-ARTICULAR; INTRALESIONAL; INTRAMUSCULAR; INTRAVENOUS; SOFT TISSUE ONCE
Status: COMPLETED | OUTPATIENT
Start: 2020-04-12 | End: 2020-04-12

## 2020-04-12 RX ORDER — DIPHENHYDRAMINE HYDROCHLORIDE 50 MG/ML
50 INJECTION INTRAMUSCULAR; INTRAVENOUS ONCE
Status: COMPLETED | OUTPATIENT
Start: 2020-04-12 | End: 2020-04-12

## 2020-04-12 RX ADMIN — DIPHENHYDRAMINE HYDROCHLORIDE 50 MG: 50 INJECTION INTRAMUSCULAR; INTRAVENOUS at 21:02

## 2020-04-12 RX ADMIN — KETOROLAC TROMETHAMINE 15 MG: 30 INJECTION, SOLUTION INTRAMUSCULAR at 21:27

## 2020-04-12 RX ADMIN — DEXAMETHASONE SODIUM PHOSPHATE 8 MG: 4 INJECTION, SOLUTION INTRA-ARTICULAR; INTRALESIONAL; INTRAMUSCULAR; INTRAVENOUS; SOFT TISSUE at 21:02

## 2020-04-12 RX ADMIN — METOCLOPRAMIDE 10 MG: 5 INJECTION, SOLUTION INTRAMUSCULAR; INTRAVENOUS at 21:02

## 2020-04-12 ASSESSMENT — FIBROSIS 4 INDEX: FIB4 SCORE: 0.84

## 2020-04-13 ENCOUNTER — PATIENT OUTREACH (OUTPATIENT)
Dept: HEALTH INFORMATION MANAGEMENT | Facility: OTHER | Age: 55
End: 2020-04-13

## 2020-04-13 NOTE — ED PROVIDER NOTES
ED Provider Note    CHIEF COMPLAINT  Chief Complaint   Patient presents with   • Headache     for the last 5 of H/A  has cyst in R frontal sinus that is causing her H/A      • N/V   • Loss of Vision     both eyes around noon today  lasted about 30 minutes  has returned        HPI  Donna Isaac is a 54 y.o. female who presents to the emergency department complaining of a headache for 5 days, nausea and vomiting, vision loss.  She states that she has a frontal sinus cyst that is going to undergo surgical intervention by [facility] yet they were able to complete it secondary to self quarantine.  She states her headache is the same as she had before in the past, the front of her head radiates to her back into her neck, is constant in nature, increases with light and noise and movement decreased with rest.  She has had chronic migraine headaches in the past and has had multiple episodes of vision loss associated with it today.  She was eating her Easter dinner and had gradual decrease in her vision.  Last approximately 30 minutes and now is completely come back.  Denies loss of sensation or strength to her arms or legs, chest pain, back pain, abdominal pain, loss of sensation or strength in arms or legs.  Denies cough.  She does states that she has subjective fever and cold intermittent for the last couple days.    REVIEW OF SYSTEMS  Positives as above. Pertinent negatives include loss of sensation or strength in arms or legs  all other review of systems are negative    PAST MEDICAL HISTORY  Past Medical History:   Diagnosis Date   • Asthma    • Chronic pain    • Congestive heart failure (HCC)     Cardiologist, Dr. Carlson   • Fibromyalgia    • GERD (gastroesophageal reflux disease)    • Hypertension    • Migraine    • Osteoporosis    • Renal disorder     CKD   • Seizure ([facility])    • TBI (traumatic brain injury) ([facility])    • Urticaria        FAMILY HISTORY  Noncontributory    SOCIAL HISTORY  Social History      Socioeconomic History   • Marital status:      Spouse name: Not on file   • Number of children: Not on file   • Years of education: Not on file   • Highest education level: Not on file   Occupational History   • Not on file   Social Needs   • Financial resource strain: Not on file   • Food insecurity     Worry: Never true     Inability: Never true   • Transportation needs     Medical: No     Non-medical: No   Tobacco Use   • Smoking status: Never Smoker   • Smokeless tobacco: Never Used   Substance and Sexual Activity   • Alcohol use: Not Currently   • Drug use: No   • Sexual activity: Not on file   Lifestyle   • Physical activity     Days per week: Not on file     Minutes per session: Not on file   • Stress: Not on file   Relationships   • Social connections     Talks on phone: Not on file     Gets together: Not on file     Attends Jewish service: Not on file     Active member of club or organization: Not on file     Attends meetings of clubs or organizations: Not on file     Relationship status: Not on file   • Intimate partner violence     Fear of current or ex partner: Not on file     Emotionally abused: Not on file     Physically abused: Not on file     Forced sexual activity: Not on file   Other Topics Concern   • Not on file   Social History Narrative   • Not on file       SURGICAL HISTORY  Past Surgical History:   Procedure Laterality Date   • SHOULDER DECOMPRESSION ARTHROSCOPIC Left 2/19/2019    Procedure: SHOULDER DECOMPRESSION ARTHROSCOPIC - SUBACROMIAL;  Surgeon: Camila Elkins M.D.;  Location: SURGERY [facility];  Service: Orthopedics   • CLAVICLE DISTAL EXCISION Left 2/19/2019    Procedure: CLAVICLE DISTAL EXCISION;  Surgeon: Camila Elkins M.D.;  Location: SURGERY [facility];  Service: Orthopedics   • SHOULDER ARTHROSCOPY W/ BICIPITAL TENODESIS REPAIR Left 2/19/2019    Procedure: SHOULDER ARTHROSCOPY W/ BICIPITAL TENODESIS REPAIR;  Surgeon: Camila Elkins  M.D.;  Location: SURGERY [facility];  Service: Orthopedics   • GASTROSCOPY N/A 2/7/2019    Procedure: GASTROSCOPY- DIALATION AND BIOPSY;  Surgeon: Hola Veliz M.D.;  Location: SURGERY [facility];  Service: Gastroenterology   • ABDOMINAL EXPLORATION  2002   • GASTRIC BYPASS LAPAROSCOPIC  1999   • HYSTERECTOMY, TOTAL ABDOMINAL  1995   • APPENDECTOMY  1974       CURRENT MEDICATIONS  Home Medications     Reviewed by Shayy Sanchez R.N. (Registered Nurse) on 04/12/20 at 2031  Med List Status: Partial   Medication Last Dose Status   AIMOVIG 70 MG/ML Solution Auto-injector  Active   albuterol 108 (90 Base) MCG/ACT Aero Soln inhalation aerosol  Active   Biotin 5000 MCG Cap  Active   calcitonin, salmon, (MIACALCIN) 200 UNIT/ACT Solution  Active   CALCIUM-MAGNESIUM-ZINC PO  Active   carvedilol (COREG) 25 MG Tab  Active   cetirizine (KLS ALLER-HAWA) 10 MG Tab  Active   cevimeline (EVOXAC) 30 MG capsule  Active   Cholecalciferol (VITAMIN D) 2000 units Cap  Active   colesevelam (WELCHOL) 625 MG Tab  Active   Cyanocobalamin (B-12) 1000 MCG/ML Kit  Active   Dexlansoprazole (DEXILANT) 60 MG CAPSULE DELAYED RELEASE delayed-release capsule  Active   doxazosin (CARDURA) 2 MG Tab  Active   DULoxetine (CYMBALTA) 60 MG Cap DR Particles delayed-release capsule  Active   EPINEPHrine (EPIPEN) 0.3 MG/0.3ML Solution Auto-injector solution for injection  Active   estradiol (ESTRACE) 0.5 MG tablet  Active   famotidine (PEPCID) 40 MG Tab  Active   Frovatriptan Succinate (FROVA) 2.5 MG Tab  Active   furosemide (LASIX) 20 MG Tab  Active   gabapentin (NEURONTIN) 400 MG Cap  Active   hydrALAZINE (APRESOLINE) 10 MG Tab  Active   hydrOXYzine HCl (ATARAX) 10 MG Tab  Active   liothyronine (CYTOMEL) 25 MCG Tab  Active   losartan (COZAAR) 100 MG Tab  Active   magnesium oxide (MAG-OX) 400 MG Tab  Active   meloxicam (MOBIC) 15 MG tablet  Active   Menaquinone-7 (VITAMIN K2) 100 MCG Cap  Active   montelukast (SINGULAIR) 10 MG Tab  Active  "  multivitamin (THERAGRAN) Tab  Active   pantoprazole (PROTONIX) 40 MG Tablet Delayed Response  Active   potassium chloride SA (KDUR) 20 MEQ Tab CR  Active   Probiotic Product (PROBIOTIC PO)  Active   Somatropin 10 MG Recon Soln  Active   spironolactone (ALDACTONE) 25 MG Tab  Active   SYNTHROID 75 MCG Tab  Active   tizanidine (ZANAFLEX) 4 MG Tab  Active                ALLERGIES  Allergies   Allergen Reactions   • Bactrim [Sulfamethoxazole W-Trimethoprim] Shortness of Breath   • Bee Venom Anaphylaxis   • Buprenorphine Anaphylaxis   • Doxycycline Hives and Shortness of Breath   • Econazole Anaphylaxis   • Floxin [Ofloxacin] Anaphylaxis, Shortness of Breath and Swelling     Cause throat swelling and difficulty breathing   • Gadolinium Derivatives Hives and Swelling     Throat swelling   • Iodine Shortness of Breath and Anaphylaxis     Throat and tongue swelling with IV contrast   • Keflex Shortness of Breath   • Levaquin Shortness of Breath     anaphlyaxis   • Levofloxacin Shortness of Breath   • Morphine Anaphylaxis   • Naloxone Hives     \"hives, SOB\"   • Nitrofurantoin Shortness of Breath     ...and hives     • Norco [Apap-Fd&C Yellow #10 Al Tai-Hydrocodone] Shortness of Breath     ...and hives     • Oxycodone Shortness of Breath     ...and hives     • Penicillins Shortness of Breath     ...and hives     • Tape Contact Dermatitis     Blisters, clear tegaderm ok.. No steristrips.   • Ancef [Cefazolin] Hives   • Bextra [Valdecoxib] Rash     \"Skin burn and peeling.\"   • Flagyl [Kdc:Metronidazole+Tartrazine] Hives   • Linezolid Rash     Rash all over body   • Other Drug Hives     \"Enconazole nitrate.\" shortness of breath and hives   • Other Misc Unspecified     Adhesive tape: blisters, skin peels off   • Clindamycin      HIVES     • Tygacil [Tigecycline]      itching   • Zithromax [Azithromycin] Hives and Shortness of Breath     Pt had Hives also       PHYSICAL EXAM  VITAL SIGNS: BP (!) 164/89   Pulse 70   Temp 36.7 " "°C (98.1 °F) (Temporal)   Resp 18   Ht 1.626 m (5' 4\")   Wt 88.8 kg (195 lb 12.3 oz)   LMP 02/07/2016 (Within Months)   SpO2 94%   BMI 33.60 kg/m²      Constitutional: Well developed, Well nourished, mild acute distress, Non-toxic appearance.   Eyes: PERRLA, EOMI, Conjunctiva normal, No discharge.   Cardiovascular: Normal heart rate, Normal rhythm, No murmurs, No rubs, No gallops, and intact distal pulses.   Thorax & Lungs:  No respiratory distress, no rales, no rhonchi, No wheezing, No chest wall tenderness.   Abdomen: Bowel sounds normal, Soft, No tenderness, No guarding, No rebound, No pulsatile masses.   Skin: Warm, Dry, No erythema, No rash.   Extremities: Full range of motion, no deformity, no edema.  Neurologic:  Alert & oriented to month and age, Normal cognition, Cranial nerves II-XII are intact, No slurred speech, Negative finger to nose bilaterally, No pronator drift bilaterally,   strength 5/5 bilaterally, Leg raise strength 5/5 bilaterally, Plantarflexion strength 5/5 bilaterally, Dorsiflexion strength 5/5 bilaterally, Deep tendon reflexes 2/4 upper and lower extremities bilaterally, Sensation intact throughout, No Nystagmus.  Psychiatric: Affect normal for clinical presentation.    COURSE & MEDICAL DECISION MAKING  Pertinent Labs & Imaging studies reviewed. (See chart for details)  I verified that the patient was wearing a mask and I was wearing appropriate PPE every time I entered the room. I donned/doffed my PPE in the correct fashion in order to reduce the risk of spread of infection. The patient's mask was on the patient at all times during my encounter except for a brief view of the oropharynx. I mostly stayed more than six feet away from the patient and when I was less than six feet from the patient I spent less than two minutes performing a physical examination.    This is a 54-year-old female that presents with migraine headaches.  She has a history of migraine headaches with " associated vision loss multiple times in the past.  Patient has no focal neurological deficits here and I do not suspect intracranial hemorrhage, idiopathic intracranial hypertension, venous thrombosis.  The patient received IV Decadron 8 mg, Toradol 15 mg, Reglan 10 mg, Benadryl 50 mg.  On reevaluation prior to discharge, she states her symptoms are completely abated she feels much better like to go home.  Strict return precautions have been given.    Discharge Medication List as of 4/12/2020 10:41 PM          FINAL IMPRESSION     1. Migraine without aura and with status migrainosus, not intractable Active   2. Visual changes Active         DISPOSITION:  Patient will be discharged home in stable condition.    FOLLOW UP:  [facility], Emergency Dept  [address]  [place] [id number] [phone]    If symptoms worsen    Madisyn Mccullough M.D.  [address] [id number] [phone]    Schedule an appointment as soon as possible for a visit         Electronically signed by: Francois Pro D.O., 4/12/2020 8:56 PM

## 2020-04-13 NOTE — ED TRIAGE NOTES
Pt was dropped off by   Comes in c/o H/A the last 5 days  Has Hx of cyst in R frontal sinus area that is the cause of these types of H/A  Has taken her mediation (Frova and zanaflex) w/ little effect  Today lost vision to both eyes that lasted about 30 minutes with is not normal for her  Having N/V as well  Is due to have surgery for there cyst removal however they had to re-schedule at [facility]

## 2020-04-15 SDOH — ECONOMIC STABILITY: INCOME INSECURITY: HOW HARD IS IT FOR YOU TO PAY FOR THE VERY BASICS LIKE FOOD, HOUSING, MEDICAL CARE, AND HEATING?: NOT HARD AT ALL

## 2020-04-15 NOTE — PROGRESS NOTES
CHW Madelaine spoke with Donna via TC after d/c to introduce CCM services. She reports doing well. No medications needed for pick. Confirmed PCP is Madisyn Mccullough M.D. No follow up appmnt yet, she declined for CHW to schedule follow up. She reports will do so this week. CHW will follow up call on Monday 4/20. She reports no transportation issues getting to [facility], no trouble paying for resources such as care, housing and food. Donna declined to speak to CCM RN for health education, and SW for additional resources. She reports no other needs at this time.    Plan: Follow up call on 4/20 regarding PCP.

## 2020-04-20 ENCOUNTER — PATIENT OUTREACH (OUTPATIENT)
Dept: HEALTH INFORMATION MANAGEMENT | Facility: OTHER | Age: 55
End: 2020-04-20

## 2020-04-20 ENCOUNTER — TELEPHONE (OUTPATIENT)
Dept: VASCULAR LAB | Facility: MEDICAL CENTER | Age: 55
End: 2020-04-20

## 2020-04-20 NOTE — TELEPHONE ENCOUNTER
Lm informing pt Dr. Bloch wants her to hold her  spironolactone and hydralazine medication and call us back if she has any other issues

## 2020-04-20 NOTE — PROGRESS NOTES
CHW spoke with Donna via TC to follow up regarding PCP. She reports PCP appmnt scheduled tomorrow at 1pm through Telehealth. Appmnt is with Madisyn Mccullough. Donna reports no other needs at this time. Advised to give CCM a call when needed. Donna met all goals, and will be d/c from CCM services.

## 2020-04-21 ENCOUNTER — TELEMEDICINE (OUTPATIENT)
Dept: CARDIOLOGY | Facility: MEDICAL CENTER | Age: 55
End: 2020-04-21
Payer: MEDICARE

## 2020-04-21 VITALS
HEART RATE: 85 BPM | HEIGHT: 64 IN | WEIGHT: 185 LBS | DIASTOLIC BLOOD PRESSURE: 88 MMHG | SYSTOLIC BLOOD PRESSURE: 117 MMHG | OXYGEN SATURATION: 96 % | BODY MASS INDEX: 31.58 KG/M2

## 2020-04-21 DIAGNOSIS — R09.89 LABILE HYPERTENSION: ICD-10-CM

## 2020-04-21 DIAGNOSIS — I71.21 ASCENDING AORTIC ANEURYSM (HCC): ICD-10-CM

## 2020-04-21 DIAGNOSIS — I42.0 DILATED CARDIOMYOPATHY (HCC): ICD-10-CM

## 2020-04-21 PROCEDURE — 99214 OFFICE O/P EST MOD 30 MIN: CPT | Mod: 95,CR | Performed by: INTERNAL MEDICINE

## 2020-04-21 ASSESSMENT — FIBROSIS 4 INDEX: FIB4 SCORE: 0.84

## 2020-04-21 NOTE — PROGRESS NOTES
"Telemedicine Visit: Established Patient     This encounter was conducted via Zoom .   Verbal consent was obtained. Patient's identity was verified.    Subjective:   CC: f/u cardiomyopathy and ascending aortic aneurysm    Donna Isaac is a 54 y.o. female presenting for evaluation and management of: f/u above issues    Since last visit with me in November, she has been hospitalized a couple of times for shortness of breath.  Repeat echocardiography showed normal LV systolic function.  It was felt to be from respiratory tract infection.    Blood pressure has been to be very difficult to control.  BP is now being managed by HTN clinic.  Recently started on doxazosin.    ROS   Denies any recent fevers or chills.   No nausea or vomiting.   No chest pains or shortness of breath.     Allergies   Allergen Reactions   • Bactrim [Sulfamethoxazole W-Trimethoprim] Shortness of Breath   • Bee Venom Anaphylaxis   • Buprenorphine Anaphylaxis   • Doxycycline Hives and Shortness of Breath   • Econazole Anaphylaxis   • Floxin [Ofloxacin] Anaphylaxis, Shortness of Breath and Swelling     Cause throat swelling and difficulty breathing   • Gadolinium Derivatives Hives and Swelling     Throat swelling   • Iodine Shortness of Breath and Anaphylaxis     Throat and tongue swelling with IV contrast   • Keflex Shortness of Breath   • Levaquin Shortness of Breath     anaphlyaxis   • Levofloxacin Shortness of Breath   • Morphine Anaphylaxis   • Naloxone Hives     \"hives, SOB\"   • Nitrofurantoin Shortness of Breath     ...and hives     • Norco [Apap-Fd&C Yellow #10 Al Tai-Hydrocodone] Shortness of Breath     ...and hives     • Oxycodone Shortness of Breath     ...and hives     • Penicillins Shortness of Breath     ...and hives     • Tape Contact Dermatitis     Blisters, clear tegaderm ok.. No steristrips.   • Ancef [Cefazolin] Hives   • Bextra [Valdecoxib] Rash     \"Skin burn and peeling.\"   • Flagyl [Kdc:Metronidazole+Tartrazine] Hives " "  • Linezolid Rash     Rash all over body   • Other Drug Hives     \"Enconazole nitrate.\" shortness of breath and hives   • Other Misc Unspecified     Adhesive tape: blisters, skin peels off   • Clindamycin      HIVES     • Tygacil [Tigecycline]      itching   • Zithromax [Azithromycin] Hives and Shortness of Breath     Pt had Hives also       Current medicines (including changes today)  Current Outpatient Medications   Medication Sig Dispense Refill   • hydrOXYzine HCl (ATARAX) 10 MG Tab Take 1 Tab by mouth 3 times a day as needed for Itching. 30 Tab 0   • doxazosin (CARDURA) 2 MG Tab TAKE 1 TABLET BY MOUTH EVERY DAY 90 Tab 3   • furosemide (LASIX) 20 MG Tab Take 10 mg by mouth 2 times a day. Pt takes @@ 0600 and 1100     • albuterol 108 (90 Base) MCG/ACT Aero Soln inhalation aerosol Inhale 2 Puffs by mouth every 6 hours as needed for Shortness of Breath. 8.5 g 0   • Dexlansoprazole (DEXILANT) 60 MG CAPSULE DELAYED RELEASE delayed-release capsule Take 60 mg by mouth every bedtime.     • famotidine (PEPCID) 40 MG Tab Take 40 mg by mouth every bedtime.     • Probiotic Product (PROBIOTIC PO) Take 1 Cap by mouth every day.     • CALCIUM-MAGNESIUM-ZINC PO Take 1 Tab by mouth every day.     • carvedilol (COREG) 25 MG Tab Take 25 mg by mouth 2 times a day, with meals.     • Somatropin 10 MG Recon Soln Inject 0.3 mg as instructed every bedtime.     • losartan (COZAAR) 100 MG Tab Take 1 Tab by mouth every day. 30 Tab 2   • DULoxetine (CYMBALTA) 60 MG Cap DR Particles delayed-release capsule Take 60 mg by mouth every evening.     • Frovatriptan Succinate (FROVA) 2.5 MG Tab Take 2.5 mg by mouth as needed (For migraines).     • montelukast (SINGULAIR) 10 MG Tab Take 10 mg by mouth every evening.     • pantoprazole (PROTONIX) 40 MG Tablet Delayed Response Take 40 mg by mouth 2 times a day.     • tizanidine (ZANAFLEX) 4 MG Tab Take 2-4 mg by mouth every 6 hours as needed. Indications: Muscle Spasticity     • calcitonin, salmon, " (MIACALCIN) 200 UNIT/ACT Solution Spray 1 Spray in nose every bedtime.  12   • meloxicam (MOBIC) 15 MG tablet Take 15 mg by mouth every evening.     • Cyanocobalamin (B-12) 1000 MCG/ML Kit 1,000 mcg by Injection route every 7 days. On Tue     • colesevelam (WELCHOL) 625 MG Tab Take 625 mg by mouth 3 times a day, with meals.     • EPINEPHrine (EPIPEN) 0.3 MG/0.3ML Solution Auto-injector solution for injection 0.3 mg by Intramuscular route Once. Indications: Life-Threatening Hypersensitivity Reaction     • cevimeline (EVOXAC) 30 MG capsule Take 1 Cap by mouth 3 times a day. 270 Cap 0   • cetirizine (KLS ALLER-HAWA) 10 MG Tab Take 20 mg by mouth 2 times a day.     • liothyronine (CYTOMEL) 25 MCG Tab Take 25 mcg by mouth every evening.     • AIMOVIG 70 MG/ML Solution Auto-injector 1 mL by Injection route Q30 DAYS.     • estradiol (ESTRACE) 0.5 MG tablet Take 0.5 mg by mouth every day.     • SYNTHROID 75 MCG Tab Take 75 mcg by mouth Every morning on an empty stomach.     • potassium chloride SA (KDUR) 20 MEQ Tab CR Take 1 Tab by mouth every day. 30 Tab 1   • gabapentin (NEURONTIN) 400 MG Cap Take 1 Cap by mouth 3 times a day. 180 Cap 3   • magnesium oxide (MAG-OX) 400 MG Tab Take 400 mg by mouth every evening.     • Biotin 5000 MCG Cap Take 5,000 mcg by mouth every day.     • Cholecalciferol (VITAMIN D) 2000 units Cap Take 2,000 Units by mouth every day.     • Menaquinone-7 (VITAMIN K2) 100 MCG Cap Take 1 Tab by mouth every day.     • multivitamin (THERAGRAN) Tab Take 1 Tab by mouth every day.     • hydrALAZINE (APRESOLINE) 10 MG Tab TAKE 1 TABLET BY MOUTH THREE TIMES DAILY AS NEEDED (Patient not taking: Reported on 4/21/2020) 270 Tab 3   • spironolactone (ALDACTONE) 25 MG Tab Take 25 mg by mouth every evening.       No current facility-administered medications for this visit.        Patient Active Problem List    Diagnosis Date Noted   • Hypotension 11/11/2019     Priority: High   • Atypical chest pain 11/05/2019      Priority: High   • Chronic systolic heart failure ([facility]) 10/11/2018     Priority: Medium   • Pain in joint of left shoulder 03/26/2018     Priority: Medium   • Hypothyroidism 11/05/2019     Priority: Low   • Hx of gastric bypass in 2009 Decmber 12/03/2018     Priority: Low   • Depression 01/16/2018     Priority: Low   • Tachycardia 03/16/2020   • Thyroid disease 03/16/2020   • Hypertension 03/16/2020   • Normocytic anemia 01/24/2020   • Chronic sinusitis 01/21/2020   • Chronic cough 05/13/2019   • Chronic rhinitis 05/13/2019   • Need for vaccination 03/15/2019   • Rash on lips 03/15/2019   • Tear of left rotator cuff s/p repair 03/15/2019   • CKD (chronic kidney disease) stage 3, GFR 30-59 ml/min ([facility]) 03/15/2019   • Class 1 obesity in adult 12/03/2018   • CKD (chronic kidney disease), stage III ([facility]) 10/25/2018   • Gastroesophageal reflux disease without esophagitis 10/11/2018   • Dry mouth in setting of trauma 2001 10/11/2018   • Muscle spasm 10/11/2018   • Fibromyalgia 10/11/2018   • Multiple allergies 10/11/2018   • Chronic migraine without aura, not intractable 10/11/2018   • Aortic root dilatation ([facility]) 10/11/2018   • Closed fracture of right wrist 03/26/2018   • Closed fracture of distal end of right radius 01/24/2018   • 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 03/07/2017   • Recurrent bacterial infection 03/07/2017   • Osteoporosis 02/22/2017   • Esophageal stricture 08/01/2016   • Advance directive on file 05/16/2016   • Central perforation of tympanic membrane of right ear 10/26/2015   • Syncope 09/30/2015   • Seizure ([facility]) 09/29/2015   • Takotsubo syndrome 09/29/2015   • History of hysterectomy for benign disease 05/15/2015   • Shoulder impingement 12/30/2014   • Osteoarthritis of right hip 05/14/2014   • History of abnormal Pap smear 02/11/2014   • IBS (irritable bowel syndrome) 09/17/2013   • Asthma 10/26/2012   • Anemia, iron deficiency, inadequate dietary intake 03/19/2009       Family History   Problem  Relation Age of Onset   • Heart Disease Mother    • Diabetes Mother    • Heart Failure Mother    • Hypertension Mother    • Hypertension Father    • Heart Disease Brother    • Heart Attack Brother    • Hypertension Brother        She  has a past medical history of Asthma, Chronic pain, Congestive heart failure (HCC), Fibromyalgia, GERD (gastroesophageal reflux disease), Hypertension, Migraine, Osteoporosis, Renal disorder, Seizure (HCC), TBI (traumatic brain injury) (HCC), and Urticaria. She also has no past medical history of Hyperlipidemia.  She  has a past surgical history that includes appendectomy (1974); gastroscopy (N/A, 2/7/2019); hysterectomy, total abdominal (1995); gastric bypass laparoscopic (1999); abdominal exploration (2002); shoulder decompression arthroscopic (Left, 2/19/2019); clavicle distal excision (Left, 2/19/2019); and shoulder arthroscopy w/ bicipital tenodesis repair (Left, 2/19/2019).       Objective:   Vitals obtained by patient:  Blood pressure: 118/80 mmHg    Physical Exam:  Constitutional: Alert, no distress, well-groomed.  Skin: No rashes in visible areas.  Eye: Round. Conjunctiva clear, lids normal. No icterus.   ENMT: Lips pink without lesions, good dentition, moist mucous membranes. Phonation normal.  Neck: No masses, no thyromegaly. Moves freely without pain.  CV: Pulse as reported by patient  Respiratory: Unlabored respiratory effort, no cough or audible wheeze  Psych: Alert and oriented x3, normal affect and mood.       Assessment and Plan:   The following treatment plan was discussed:     1. Dilated cardiomyopathy (HCC) probably takotsubo, no CHF    2. Labile hypertension  Continue f/u with HTN clinic    3. Ascending aortic aneurysm (HCC) 4.1 cm on last CTA 1/20, different measurements on multiple recent echos but 4 cm on CTA 9/2018     The patient's above cardiovascular conditions are stable.   Will continue current medications and have the patient return for a followup in 6  months. Will be happy to see the patient sooner as needed.   Thank you for allowing me to participate in the caring of this patient.      Follow-up: No follow-ups on file.

## 2020-04-21 NOTE — LETTER
[facility]   [address] [place] [address]  [place] [address]  Phone: [phone]  Fax: [phone]              Donna Isaac  1965    Encounter Date: 4/21/2020    Adriana Carlson M.D.          PROGRESS NOTE:  No notes on file      No Recipients

## 2020-05-12 ENCOUNTER — OFFICE VISIT (OUTPATIENT)
Dept: NEUROLOGY | Facility: MEDICAL CENTER | Age: 55
End: 2020-05-12
Payer: MEDICARE

## 2020-05-12 ENCOUNTER — OFFICE VISIT (OUTPATIENT)
Dept: VASCULAR LAB | Facility: MEDICAL CENTER | Age: 55
End: 2020-05-12
Attending: INTERNAL MEDICINE
Payer: MEDICARE

## 2020-05-12 VITALS
WEIGHT: 193.2 LBS | SYSTOLIC BLOOD PRESSURE: 116 MMHG | HEART RATE: 78 BPM | DIASTOLIC BLOOD PRESSURE: 80 MMHG | BODY MASS INDEX: 32.98 KG/M2 | HEIGHT: 64 IN

## 2020-05-12 VITALS
WEIGHT: 199.08 LBS | BODY MASS INDEX: 33.99 KG/M2 | HEART RATE: 75 BPM | OXYGEN SATURATION: 98 % | SYSTOLIC BLOOD PRESSURE: 120 MMHG | RESPIRATION RATE: 16 BRPM | TEMPERATURE: 98.6 F | HEIGHT: 64 IN | DIASTOLIC BLOOD PRESSURE: 78 MMHG

## 2020-05-12 DIAGNOSIS — F43.10 PTSD (POST-TRAUMATIC STRESS DISORDER): ICD-10-CM

## 2020-05-12 DIAGNOSIS — I10 ESSENTIAL HYPERTENSION: ICD-10-CM

## 2020-05-12 PROCEDURE — 64615 CHEMODENERV MUSC MIGRAINE: CPT | Performed by: NURSE PRACTITIONER

## 2020-05-12 PROCEDURE — 99212 OFFICE O/P EST SF 10 MIN: CPT

## 2020-05-12 PROCEDURE — 99213 OFFICE O/P EST LOW 20 MIN: CPT | Performed by: INTERNAL MEDICINE

## 2020-05-12 ASSESSMENT — ENCOUNTER SYMPTOMS
PALPITATIONS: 0
DIZZINESS: 0
SHORTNESS OF BREATH: 0
HEADACHES: 1
SPEECH CHANGE: 0
WEIGHT LOSS: 0
SENSORY CHANGE: 0
BACK PAIN: 1
FOCAL WEAKNESS: 0
SEIZURES: 0
COUGH: 0
NERVOUS/ANXIOUS: 0
DEPRESSION: 0

## 2020-05-12 ASSESSMENT — FIBROSIS 4 INDEX
FIB4 SCORE: 0.84
FIB4 SCORE: 0.84

## 2020-05-12 NOTE — PROGRESS NOTES
Subjective:      Donna Isaac is a 54 y.o. female who presents with Botox Injection (Chronic migraine)            HPI  Here for Botox #3 treatment today.    Just last week was suspected for sepsis and it was determined it was due to her sinus cyst which was surgically removed.  She feeling improved today.    Last saw Dr Michael Bloch this morning for vascular cardiology follow-up.    Current Outpatient Medications   Medication Sig Dispense Refill   • hydrOXYzine HCl (ATARAX) 10 MG Tab Take 1 Tab by mouth 3 times a day as needed for Itching. 30 Tab 0   • doxazosin (CARDURA) 2 MG Tab TAKE 1 TABLET BY MOUTH EVERY DAY 90 Tab 3   • furosemide (LASIX) 20 MG Tab Take 10 mg by mouth 2 times a day. Pt takes @@ 0600 and 1100     • albuterol 108 (90 Base) MCG/ACT Aero Soln inhalation aerosol Inhale 2 Puffs by mouth every 6 hours as needed for Shortness of Breath. 8.5 g 0   • Dexlansoprazole (DEXILANT) 60 MG CAPSULE DELAYED RELEASE delayed-release capsule Take 60 mg by mouth every bedtime.     • famotidine (PEPCID) 40 MG Tab Take 40 mg by mouth every bedtime.     • Probiotic Product (PROBIOTIC PO) Take 1 Cap by mouth every day.     • CALCIUM-MAGNESIUM-ZINC PO Take 1 Tab by mouth every day.     • carvedilol (COREG) 25 MG Tab Take 25 mg by mouth 2 times a day, with meals.     • Somatropin 10 MG Recon Soln Inject 0.3 mg as instructed every bedtime.     • losartan (COZAAR) 100 MG Tab Take 1 Tab by mouth every day. 30 Tab 2   • DULoxetine (CYMBALTA) 60 MG Cap DR Particles delayed-release capsule Take 60 mg by mouth every evening.     • Frovatriptan Succinate (FROVA) 2.5 MG Tab Take 2.5 mg by mouth as needed (For migraines).     • montelukast (SINGULAIR) 10 MG Tab Take 10 mg by mouth every evening.     • pantoprazole (PROTONIX) 40 MG Tablet Delayed Response Take 40 mg by mouth 2 times a day.     • tizanidine (ZANAFLEX) 4 MG Tab Take 2-4 mg by mouth every 6 hours as needed. Indications: Muscle Spasticity     • calcitonin,  "salmon, (MIACALCIN) 200 UNIT/ACT Solution Spray 1 Spray in nose every bedtime.  12   • meloxicam (MOBIC) 15 MG tablet Take 15 mg by mouth every evening.     • Cyanocobalamin (B-12) 1000 MCG/ML Kit 1,000 mcg by Injection route every 7 days. On Tue     • colesevelam (WELCHOL) 625 MG Tab Take 625 mg by mouth 3 times a day, with meals.     • EPINEPHrine (EPIPEN) 0.3 MG/0.3ML Solution Auto-injector solution for injection 0.3 mg by Intramuscular route Once. Indications: Life-Threatening Hypersensitivity Reaction     • cevimeline (EVOXAC) 30 MG capsule Take 1 Cap by mouth 3 times a day. 270 Cap 0   • cetirizine (KLS ALLER-HAWA) 10 MG Tab Take 20 mg by mouth 2 times a day.     • liothyronine (CYTOMEL) 25 MCG Tab Take 25 mcg by mouth every evening.     • AIMOVIG 70 MG/ML Solution Auto-injector 1 mL by Injection route Q30 DAYS.     • estradiol (ESTRACE) 0.5 MG tablet Take 0.5 mg by mouth every day.     • SYNTHROID 75 MCG Tab Take 75 mcg by mouth Every morning on an empty stomach.     • potassium chloride SA (KDUR) 20 MEQ Tab CR Take 1 Tab by mouth every day. 30 Tab 1   • gabapentin (NEURONTIN) 400 MG Cap Take 1 Cap by mouth 3 times a day. 180 Cap 3   • magnesium oxide (MAG-OX) 400 MG Tab Take 400 mg by mouth every evening.     • Biotin 5000 MCG Cap Take 5,000 mcg by mouth every day.     • Cholecalciferol (VITAMIN D) 2000 units Cap Take 2,000 Units by mouth every day.     • Menaquinone-7 (VITAMIN K2) 100 MCG Cap Take 1 Tab by mouth every day.     • multivitamin (THERAGRAN) Tab Take 1 Tab by mouth every day.       No current facility-administered medications for this visit.          ROS       Objective:     /78 (BP Location: Left arm, Patient Position: Sitting, BP Cuff Size: Adult)   Pulse 75   Temp 37 °C (98.6 °F) (Temporal)   Resp 16   Ht 1.62 m (5' 3.78\")   Wt 90.3 kg (199 lb 1.2 oz)   LMP 02/07/2016 (Within Months)   SpO2 98%   BMI 34.41 kg/m²      Physical Exam            Assessment/Plan:     Chronic " Migraine:  Botox therapy has reduced patient’s migraines by more than 7 days and/or 100 hours per month.  Additionally pt has noted a reduction in the amount of medication they are taking to treat their migraines and/or they are having a reduction in intractable migraine/status migrainosis which can result in urgent care or ER visits.     Documentation of patient's symptoms with migraine are included in the initial note with this patient including the following:  Education/counseling/reduction in medications if pt has medication overuse headache;  Frequency of headaches is >15 days monthly with at least 8 migraines monthly;  Migraines include at least two of the following: worsened with activity or avoidance of activity with migraines (ie they go lie down), moderate to severe pain intensity, pulsing headache, unilateral headache AND they have either nausea or vomiting OR sensitivity to light and sound     Although this patient is responding to botox they are NOT migraine free, however, and it is my recommendation botox be continued at this time     I treated this patient in clinic today with BotoxA 155 units according to the dosing/injection paradigm currently mandated by the FDA for the management of chronic migraine. Specifically, I injected 5 units to the procerus, 5 units to the corrugators bilaterally, a total of 20 units to the frontalis musculature, 20 units to the temporalis bilaterally, 15 units to the occipitalis bilaterally, 10 units to the cervical paraspinals bilaterally and 15 units to the trapezius musculature bilaterally. The remainder of the Botox 200 units mixed but not administered was discarded as wastage per FDA guidelines.      Return for follow-up in 12 weeks for 4th set of Botox

## 2020-05-12 NOTE — PROGRESS NOTES
"  Follow Up VASCULAR VISIT  Subjective:   Donna Isaac is a 54 y.o. female who presents today 5-12-20 for   Chief Complaint   Patient presents with   • Follow-Up     HPI:  Here for f/u of htn in settting of takasubo's cm, thyroid disease, and dm.  Had sinus surgery last week - had relative hypotension post op.  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 She is interested in seeing a counselor for ptsd    Social History     Tobacco Use   Smoking Status Never Smoker   Smokeless Tobacco Never Used     DIET AND EXERCISE:  Weight Change: down a few pounds  Diet: common adult  Exercise: minimal exercise     Review of Systems   Constitutional: Positive for malaise/fatigue. Negative for weight loss.   Respiratory: Negative for cough and shortness of breath.    Cardiovascular: Negative for chest pain, palpitations and leg swelling.   Musculoskeletal: Positive for back pain and joint pain.   Neurological: Positive for headaches. Negative for dizziness, sensory change, speech change, focal weakness and seizures.   Psychiatric/Behavioral: Negative for depression. The patient is not nervous/anxious.       Objective:     Vitals:    05/12/20 0913   BP: 116/80   BP Location: Left arm   Patient Position: Sitting   BP Cuff Size: Adult   Pulse: 78   Weight: 87.6 kg (193 lb 3.2 oz)   Height: 1.62 m (5' 3.78\")      Body mass index is 33.39 kg/m².  Physical Exam  Vitals signs reviewed.   Constitutional:       General: She is not in acute distress.     Appearance: She is well-developed. She is not diaphoretic.   Neck:      Musculoskeletal: Neck supple.      Thyroid: No thyromegaly.      Vascular: No carotid bruit or JVD.   Cardiovascular:     "  Rate and Rhythm: Normal rate and regular rhythm.      Heart sounds: No murmur.   Pulmonary:      Effort: Pulmonary effort is normal. No respiratory distress.      Breath sounds: No rales.   Musculoskeletal:         General: No swelling or tenderness.      Comments: Right foot in boot   Neurological:      General: No focal deficit present.      Mental Status: She is alert and oriented to person, place, and time.      Cranial Nerves: No cranial nerve deficit.      Coordination: Coordination normal.      Deep Tendon Reflexes: Reflexes are normal and symmetric.   Psychiatric:         Mood and Affect: Mood normal.         Behavior: Behavior normal.       Lab Results   Component Value Date    CHOLSTRLTOT 193 08/26/2019    LDL 65 08/26/2019    HDL 92 08/26/2019    TRIGLYCERIDE 179 (H) 08/26/2019      Lab Results   Component Value Date    PROTHROMBTM 12.3 12/10/2019    INR 0.91 12/10/2019       Lab Results   Component Value Date    HBA1C 5.9 (H) 03/16/2020      Lab Results   Component Value Date    SODIUM 136 04/03/2020    SODIUM 137 04/03/2020    POTASSIUM 3.9 04/03/2020    POTASSIUM 4.0 04/03/2020    CHLORIDE 99 04/03/2020    CHLORIDE 98 04/03/2020    CO2 21 04/03/2020    CO2 21 04/03/2020    GLUCOSE 101 (H) 04/03/2020    GLUCOSE 97 04/03/2020    BUN 10 04/03/2020    BUN 11 04/03/2020    CREATININE 1.03 04/03/2020    CREATININE 1.04 04/03/2020    BUNCREATRAT 11 10/12/2018    IFAFRICA >60 04/03/2020    IFAFRICA >60 04/03/2020    IFNOTAFR 55 (A) 04/03/2020    IFNOTAFR 55 (A) 04/03/2020      Lab Results   Component Value Date    ALDOSTERONE 10.9 04/03/2020    ALDOSTERONE 11.6 04/03/2020    PRA - 5.5  Plasma mets - normal    Lab Results   Component Value Date    WBC 7.4 03/16/2020    RBC 5.23 03/16/2020    HEMOGLOBIN 13.4 03/16/2020    HEMATOCRIT 43.9 03/16/2020    MCV 83.9 03/16/2020    MCH 25.6 (L) 03/16/2020    MCHC 30.5 (L) 03/16/2020    MPV 10.3 03/16/2020      Echo march 2020  Normal left ventricular systolic function.  Left ventricular ejection   fraction is visually estimated to be 70%.  Normal diastolic function.  Ascending aorta is dilated with a diameter of 4.3  cm.    ABPM March 2020  Ambulatory blood pressure monitor results reviewed  Full report under media tab  Reasonable data acquisition  Mean daytime: 164/101 consistent with poorly controlled mixed systolic and diastolic out of office blood pressure  Nocturnal dip: Appears to be present based upon the curves but not on the averages  Did have a precipitous fall in blood pressure during early morning hours without increase in heart rate    Medical Decision Making:  Today's Assessment / Status / Plan:     1. Essential hypertension     2. PTSD (post-traumatic stress disorder)  REFERRAL TO PEDIATRIC PSYCHOLOGY     Patient Type: Primary Prevention    Etiology of Established CVD if Present: Recurrent stress-induced cardiomyopathy    Lipid Management: Qualifies for Statin Therapy Based on 2013 ACC/AHA Guidelines: no  Calculated 10-Year Risk of ASCVD: 2  Currently on Statin: No  Clean coronaries on catheterization by her report in the past  No clear indication for statin therapy  -Continue lifestyle modification    Blood Pressure Management:  ACC-AHA blood pressure goal less than 130/80  Reasonably long history of labile blood pressure  Much improved at home and in office  Given her history of stress-induced cardiomyopathy, I think this likely represents 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 cortisol is normal  Aldosterone renin ratio not consistent with primary aldosteronism  No albuminuria  Hopefully her sinus surgery and improvement in her headache syndrome may help her blood pressure  Plan:  -Continue home blood  pressure monitoring  -continue doxazosin 2 mg each night  - continue losartan  - continue furosemide for now - patient thinks it helps her swelling - but consider change to hctz in future  -hold renal artery duplex for now  -Defer decisions about how to potentially manage her steroids to endo    Glycemic Status: Normal    Anti-Platelet/Anti-Coagulant Tx: Not currently indicated    Smoking: Continue complete avoidance    Physical Activity: Defer recommendations to her orthopedic surgeon    Weight Management and Nutrition: Dietary plan was discussed with patient at this visit including Mediterranean-style diet low in sodium    Other:     1) stress-induced cardiomyopathy, recurrent -most recent ejection fraction is normal.  No signs or symptoms of heart failure on exam currently.  She has had a normal Holter monitor and cardiac cath by report  -Continue current neurohormonal therapy  -Defer any other further work-up and management to cardiology    2) hypothyroidism -defer management to endocrinology    3) sinus cyst and migraine headache -defer management to ENT and neurology    4) multiple orthopedic issues -defer management to orthopedics    5) fibromyalgia -defer management to neurology and PCP    6) PTSD - courtesy referral to psychologist at patient request    Instructed to follow-up with PCP for remainder of adult medical needs: yes  We will partner with other providers in the management of established vascular disease and cardiometabolic risk factors.    Studies to Be Obtained: none  Labs to Be Obtained: None currently    Follow up in: 4 months    Michael J Bloch, M.D.     Cc:  DOUGIE Butler

## 2020-05-29 DIAGNOSIS — I10 ESSENTIAL HYPERTENSION: Primary | ICD-10-CM

## 2020-06-01 RX ORDER — POTASSIUM CHLORIDE 20 MEQ/1
TABLET, EXTENDED RELEASE ORAL
Qty: 90 TAB | Refills: 3 | Status: SHIPPED | OUTPATIENT
Start: 2020-06-01 | End: 2020-06-28

## 2020-06-08 ENCOUNTER — HOSPITAL ENCOUNTER (OUTPATIENT)
Dept: RADIOLOGY | Facility: MEDICAL CENTER | Age: 55
End: 2020-06-08
Attending: ORTHOPAEDIC SURGERY
Payer: MEDICARE

## 2020-06-08 DIAGNOSIS — M67.01 SHORT ACHILLES TENDON (ACQUIRED), RIGHT ANKLE: ICD-10-CM

## 2020-06-08 PROCEDURE — 73700 CT LOWER EXTREMITY W/O DYE: CPT | Mod: RT

## 2020-06-19 ENCOUNTER — HOSPITAL ENCOUNTER (OUTPATIENT)
Dept: LAB | Facility: MEDICAL CENTER | Age: 55
End: 2020-06-19
Attending: INTERNAL MEDICINE
Payer: MEDICARE

## 2020-06-19 PROCEDURE — 82340 ASSAY OF CALCIUM IN URINE: CPT

## 2020-06-19 PROCEDURE — 84105 ASSAY OF URINE PHOSPHORUS: CPT

## 2020-06-19 PROCEDURE — 82533 TOTAL CORTISOL: CPT

## 2020-06-19 PROCEDURE — 82570 ASSAY OF URINE CREATININE: CPT

## 2020-06-19 PROCEDURE — 36415 COLL VENOUS BLD VENIPUNCTURE: CPT

## 2020-06-19 PROCEDURE — 84133 ASSAY OF URINE POTASSIUM: CPT

## 2020-06-19 PROCEDURE — 80048 BASIC METABOLIC PNL TOTAL CA: CPT

## 2020-06-19 PROCEDURE — 80053 COMPREHEN METABOLIC PANEL: CPT

## 2020-06-19 PROCEDURE — 84300 ASSAY OF URINE SODIUM: CPT

## 2020-06-19 PROCEDURE — 83036 HEMOGLOBIN GLYCOSYLATED A1C: CPT | Mod: GA

## 2020-06-19 PROCEDURE — 82088 ASSAY OF ALDOSTERONE: CPT

## 2020-06-19 PROCEDURE — 86769 SARS-COV-2 COVID-19 ANTIBODY: CPT

## 2020-06-20 LAB
ALBUMIN SERPL BCP-MCNC: 4.1 G/DL (ref 3.2–4.9)
ALBUMIN/GLOB SERPL: 1.4 G/DL
ALP SERPL-CCNC: 87 U/L (ref 30–99)
ALT SERPL-CCNC: 28 U/L (ref 2–50)
ANION GAP SERPL CALC-SCNC: 12 MMOL/L (ref 7–16)
ANION GAP SERPL CALC-SCNC: 15 MMOL/L (ref 7–16)
AST SERPL-CCNC: 32 U/L (ref 12–45)
BILIRUB SERPL-MCNC: 0.2 MG/DL (ref 0.1–1.5)
BUN SERPL-MCNC: 17 MG/DL (ref 8–22)
BUN SERPL-MCNC: 17 MG/DL (ref 8–22)
CALCIUM SERPL-MCNC: 8.6 MG/DL (ref 8.5–10.5)
CALCIUM SERPL-MCNC: 8.6 MG/DL (ref 8.5–10.5)
CHLORIDE SERPL-SCNC: 101 MMOL/L (ref 96–112)
CHLORIDE SERPL-SCNC: 105 MMOL/L (ref 96–112)
CO2 SERPL-SCNC: 20 MMOL/L (ref 20–33)
CO2 SERPL-SCNC: 22 MMOL/L (ref 20–33)
CORTIS SERPL-MCNC: 1.7 UG/DL (ref 0–23)
CREAT SERPL-MCNC: 0.95 MG/DL (ref 0.5–1.4)
CREAT SERPL-MCNC: 0.99 MG/DL (ref 0.5–1.4)
CREAT UR-MCNC: 12.15 MG/DL
EST. AVERAGE GLUCOSE BLD GHB EST-MCNC: 126 MG/DL
FASTING STATUS PATIENT QL REPORTED: NORMAL
GLOBULIN SER CALC-MCNC: 2.9 G/DL (ref 1.9–3.5)
GLUCOSE SERPL-MCNC: 81 MG/DL (ref 65–99)
GLUCOSE SERPL-MCNC: 84 MG/DL (ref 65–99)
HBA1C MFR BLD: 6 % (ref 0–5.6)
POTASSIUM SERPL-SCNC: 3.8 MMOL/L (ref 3.6–5.5)
POTASSIUM SERPL-SCNC: 3.8 MMOL/L (ref 3.6–5.5)
POTASSIUM UR-SCNC: 14 MMOL/L
PROT SERPL-MCNC: 7 G/DL (ref 6–8.2)
SODIUM SERPL-SCNC: 135 MMOL/L (ref 135–145)
SODIUM SERPL-SCNC: 140 MMOL/L (ref 135–145)
SODIUM UR-SCNC: 112 MMOL/L

## 2020-06-22 LAB
ALDOST SERPL-MCNC: 6.4 NG/DL
CALCIUM 24H UR-MCNC: 4 MG/DL
CALCIUM 24H UR-MRATE: ABNORMAL MG/D
CALCIUM/CREAT 24H UR: 308 MG/G (ref 20–300)
COLLECT DURATION TIME SPEC: ABNORMAL HRS
COLLECT DURATION TIME SPEC: NORMAL HRS
CREAT 24H UR-MCNC: 13 MG/DL
CREAT 24H UR-MRATE: ABNORMAL MG/D (ref 500–1400)
PHOSPHATE 24H UR-MCNC: 8 MG/DL
PHOSPHATE 24H UR-MRATE: NORMAL MG/D (ref 400–1300)
PHOSPHATE/CREAT 24H UR: 615 MG/G
SARS-COV-2 AB SERPL QL IA: NORMAL
SPECIMEN VOL ?TM UR: ABNORMAL ML
TOTAL VOLUME 1105: NORMAL ML

## 2020-06-28 ENCOUNTER — HOSPITAL ENCOUNTER (EMERGENCY)
Facility: MEDICAL CENTER | Age: 55
End: 2020-06-28
Attending: EMERGENCY MEDICINE
Payer: MEDICARE

## 2020-06-28 ENCOUNTER — APPOINTMENT (OUTPATIENT)
Dept: RADIOLOGY | Facility: MEDICAL CENTER | Age: 55
End: 2020-06-28
Attending: EMERGENCY MEDICINE
Payer: MEDICARE

## 2020-06-28 VITALS
OXYGEN SATURATION: 94 % | BODY MASS INDEX: 33.35 KG/M2 | DIASTOLIC BLOOD PRESSURE: 105 MMHG | HEIGHT: 64 IN | RESPIRATION RATE: 18 BRPM | WEIGHT: 195.33 LBS | TEMPERATURE: 95.5 F | SYSTOLIC BLOOD PRESSURE: 147 MMHG | HEART RATE: 106 BPM

## 2020-06-28 DIAGNOSIS — J32.0 CHRONIC MAXILLARY SINUSITIS: ICD-10-CM

## 2020-06-28 LAB
ALBUMIN SERPL BCP-MCNC: 4.2 G/DL (ref 3.2–4.9)
ALBUMIN/GLOB SERPL: 1.3 G/DL
ALP SERPL-CCNC: 82 U/L (ref 30–99)
ALT SERPL-CCNC: 30 U/L (ref 2–50)
ANION GAP SERPL CALC-SCNC: 17 MMOL/L (ref 7–16)
AST SERPL-CCNC: 33 U/L (ref 12–45)
BASOPHILS # BLD AUTO: 0.3 % (ref 0–1.8)
BASOPHILS # BLD: 0.02 K/UL (ref 0–0.12)
BILIRUB SERPL-MCNC: 0.3 MG/DL (ref 0.1–1.5)
BUN SERPL-MCNC: 18 MG/DL (ref 8–22)
CALCIUM SERPL-MCNC: 9.1 MG/DL (ref 8.4–10.2)
CHLORIDE SERPL-SCNC: 102 MMOL/L (ref 96–112)
CO2 SERPL-SCNC: 21 MMOL/L (ref 20–33)
CREAT SERPL-MCNC: 1.23 MG/DL (ref 0.5–1.4)
EOSINOPHIL # BLD AUTO: 0.08 K/UL (ref 0–0.51)
EOSINOPHIL NFR BLD: 1.1 % (ref 0–6.9)
ERYTHROCYTE [DISTWIDTH] IN BLOOD BY AUTOMATED COUNT: 58.3 FL (ref 35.9–50)
GLOBULIN SER CALC-MCNC: 3.3 G/DL (ref 1.9–3.5)
GLUCOSE SERPL-MCNC: 96 MG/DL (ref 65–99)
HCT VFR BLD AUTO: 48.1 % (ref 37–47)
HGB BLD-MCNC: 14.9 G/DL (ref 12–16)
IMM GRANULOCYTES # BLD AUTO: 0.03 K/UL (ref 0–0.11)
IMM GRANULOCYTES NFR BLD AUTO: 0.4 % (ref 0–0.9)
LYMPHOCYTES # BLD AUTO: 1.76 K/UL (ref 1–4.8)
LYMPHOCYTES NFR BLD: 25.3 % (ref 22–41)
MCH RBC QN AUTO: 27.3 PG (ref 27–33)
MCHC RBC AUTO-ENTMCNC: 31 G/DL (ref 33.6–35)
MCV RBC AUTO: 88.1 FL (ref 81.4–97.8)
MONOCYTES # BLD AUTO: 0.79 K/UL (ref 0–0.85)
MONOCYTES NFR BLD AUTO: 11.4 % (ref 0–13.4)
NEUTROPHILS # BLD AUTO: 4.28 K/UL (ref 2–7.15)
NEUTROPHILS NFR BLD: 61.5 % (ref 44–72)
NRBC # BLD AUTO: 0 K/UL
NRBC BLD-RTO: 0 /100 WBC
PLATELET # BLD AUTO: 289 K/UL (ref 164–446)
PMV BLD AUTO: 10.3 FL (ref 9–12.9)
POTASSIUM SERPL-SCNC: 3.7 MMOL/L (ref 3.6–5.5)
PROT SERPL-MCNC: 7.5 G/DL (ref 6–8.2)
RBC # BLD AUTO: 5.46 M/UL (ref 4.2–5.4)
SODIUM SERPL-SCNC: 140 MMOL/L (ref 135–145)
WBC # BLD AUTO: 7 K/UL (ref 4.8–10.8)

## 2020-06-28 PROCEDURE — 70487 CT MAXILLOFACIAL W/DYE: CPT

## 2020-06-28 PROCEDURE — 80053 COMPREHEN METABOLIC PANEL: CPT

## 2020-06-28 PROCEDURE — 700117 HCHG RX CONTRAST REV CODE 255: Performed by: EMERGENCY MEDICINE

## 2020-06-28 PROCEDURE — 96374 THER/PROPH/DIAG INJ IV PUSH: CPT | Mod: XU

## 2020-06-28 PROCEDURE — 96375 TX/PRO/DX INJ NEW DRUG ADDON: CPT | Mod: XU

## 2020-06-28 PROCEDURE — 99284 EMERGENCY DEPT VISIT MOD MDM: CPT

## 2020-06-28 PROCEDURE — 85025 COMPLETE CBC W/AUTO DIFF WBC: CPT

## 2020-06-28 PROCEDURE — 700111 HCHG RX REV CODE 636 W/ 250 OVERRIDE (IP): Performed by: EMERGENCY MEDICINE

## 2020-06-28 RX ORDER — METHYLPREDNISOLONE 4 MG/1
TABLET ORAL
Qty: 1 PACKAGE | Refills: 0 | Status: SHIPPED | OUTPATIENT
Start: 2020-06-28 | End: 2020-07-07

## 2020-06-28 RX ORDER — PARICALCITOL 1 UG/1
CAPSULE, LIQUID FILLED ORAL DAILY
Status: SHIPPED | COMMUNITY
Start: 2020-04-20 | End: 2020-06-28

## 2020-06-28 RX ORDER — POTASSIUM CHLORIDE 20 MEQ/1
20 TABLET, EXTENDED RELEASE ORAL DAILY
Status: SHIPPED | COMMUNITY
End: 2020-09-28

## 2020-06-28 RX ORDER — DOXAZOSIN 2 MG/1
2 TABLET ORAL EVERY EVENING
Status: SHIPPED | COMMUNITY
End: 2020-09-28

## 2020-06-28 RX ORDER — METHYLPREDNISOLONE SODIUM SUCCINATE 125 MG/2ML
125 INJECTION, POWDER, LYOPHILIZED, FOR SOLUTION INTRAMUSCULAR; INTRAVENOUS ONCE
Status: COMPLETED | OUTPATIENT
Start: 2020-06-28 | End: 2020-06-28

## 2020-06-28 RX ORDER — DIPHENHYDRAMINE HYDROCHLORIDE 50 MG/ML
25 INJECTION INTRAMUSCULAR; INTRAVENOUS ONCE
Status: COMPLETED | OUTPATIENT
Start: 2020-06-28 | End: 2020-06-28

## 2020-06-28 RX ORDER — ACETAMINOPHEN 500 MG
1000 TABLET ORAL EVERY 6 HOURS PRN
Status: ON HOLD | COMMUNITY
End: 2020-08-19

## 2020-06-28 RX ORDER — DEXAMETHASONE 0.5 MG/1
0.5 TABLET ORAL
Status: ON HOLD | COMMUNITY
Start: 2020-05-14 | End: 2021-05-15

## 2020-06-28 RX ORDER — AMITRIPTYLINE HYDROCHLORIDE 10 MG/1
10 TABLET, FILM COATED ORAL
Status: SHIPPED | COMMUNITY
End: 2020-08-04 | Stop reason: SDUPTHER

## 2020-06-28 RX ADMIN — METHYLPREDNISOLONE SODIUM SUCCINATE 125 MG: 125 INJECTION, POWDER, FOR SOLUTION INTRAMUSCULAR; INTRAVENOUS at 09:00

## 2020-06-28 RX ADMIN — DIPHENHYDRAMINE HYDROCHLORIDE 25 MG: 50 INJECTION, SOLUTION INTRAMUSCULAR; INTRAVENOUS at 09:00

## 2020-06-28 RX ADMIN — IOHEXOL 80 ML: 350 INJECTION, SOLUTION INTRAVENOUS at 09:23

## 2020-06-28 SDOH — HEALTH STABILITY: MENTAL HEALTH: HOW OFTEN DO YOU HAVE A DRINK CONTAINING ALCOHOL?: 2-4 TIMES A MONTH

## 2020-06-28 ASSESSMENT — FIBROSIS 4 INDEX: FIB4 SCORE: 0.96

## 2020-06-28 NOTE — ED TRIAGE NOTES
PT ambulated into room 10 with a steady gait. Pt states that she had complicated nasal and sinus surgery May 2 with her last appointment was the week of June 15th. Pt CO right side sinus fullness that will not clear with saline rinse. Pt denies any fever, chills or SOB

## 2020-06-28 NOTE — ED NOTES
Med rec updated and complete  Allergies reviewed  Interviewed pt with  at bedside with permission   from pt.  Pt reports that she takes FAMOTIDINE 40MG, DEXILANT 60MG, and PROTONIX 40MG.  Pt reports no antibiotics in the last 2 weeks

## 2020-06-28 NOTE — ED PROVIDER NOTES
ED Provider Note    CHIEF COMPLAINT  No chief complaint on file.      HPI  Donna Isaac is a 54 y.o. female who presents for evaluation of facial pain and swelling nasal discharge pain in her right upper row of teeth.  The patient recently had sinus surgery at [facility] approximately 7 weeks ago.  She underwent endoscopic right maxillary ethmoidectomy, sinusotomy as well as maxillary antrostomy.  She is not placed on any antibiotics after the surgery.  She was doing quite well but then developed some facial pain.  She specifically denies high fevers or chills.  No double vision.  No strokelike symptoms.    REVIEW OF SYSTEMS  See HPI for further details.  No night sweats weight loss numbness tingling weakness rash all other systems are negative.     PAST MEDICAL HISTORY  Past Medical History:   Diagnosis Date   • Asthma    • Chronic pain    • Congestive heart failure ([facility])     Cardiologist, Dr. Carlson   • Fibromyalgia    • GERD (gastroesophageal reflux disease)    • Hypertension    • Migraine    • Osteoporosis    • Renal disorder     CKD   • Seizure ([facility])    • TBI (traumatic brain injury) ([facility])    • Urticaria        FAMILY HISTORY  Noncontributory    SOCIAL HISTORY  Social History     Socioeconomic History   • Marital status:      Spouse name: Not on file   • Number of children: Not on file   • Years of education: Not on file   • Highest education level: Not on file   Occupational History   • Not on file   Social Needs   • Financial resource strain: Not hard at all   • Food insecurity     Worry: Never true     Inability: Never true   • Transportation needs     Medical: No     Non-medical: No   Tobacco Use   • Smoking status: Never Smoker   • Smokeless tobacco: Never Used   Substance and Sexual Activity   • Alcohol use: Not Currently   • Drug use: No   • Sexual activity: Not on file   Lifestyle   • Physical activity     Days per week: Not on file     Minutes per session: Not on file   •  Stress: Not on file   Relationships   • Social connections     Talks on phone: Not on file     Gets together: Not on file     Attends Zoroastrianism service: Not on file     Active member of club or organization: Not on file     Attends meetings of clubs or organizations: Not on file     Relationship status: Not on file   • Intimate partner violence     Fear of current or ex partner: Not on file     Emotionally abused: Not on file     Physically abused: Not on file     Forced sexual activity: Not on file   Other Topics Concern   • Not on file   Social History Narrative   • Not on file       SURGICAL HISTORY  Past Surgical History:   Procedure Laterality Date   • SHOULDER DECOMPRESSION ARTHROSCOPIC Left 2/19/2019    Procedure: SHOULDER DECOMPRESSION ARTHROSCOPIC - SUBACROMIAL;  Surgeon: Camila Elkins M.D.;  Location: [facility];  Service: Orthopedics   • CLAVICLE DISTAL EXCISION Left 2/19/2019    Procedure: CLAVICLE DISTAL EXCISION;  Surgeon: Camila Elkins M.D.;  Location: [facility];  Service: Orthopedics   • SHOULDER ARTHROSCOPY W/ BICIPITAL TENODESIS REPAIR Left 2/19/2019    Procedure: SHOULDER ARTHROSCOPY W/ BICIPITAL TENODESIS REPAIR;  Surgeon: Camila Elkins M.D.;  Location: [facility];  Service: Orthopedics   • GASTROSCOPY N/A 2/7/2019    Procedure: GASTROSCOPY- DIALATION AND BIOPSY;  Surgeon: Hola Veliz M.D.;  Location: [facility];  Service: Gastroenterology   • ABDOMINAL EXPLORATION  2002   • GASTRIC BYPASS LAPAROSCOPIC  1999   • HYSTERECTOMY, TOTAL ABDOMINAL  1995   • APPENDECTOMY  1974       CURRENT MEDICATIONS  No current facility-administered medications for this encounter.     Current Outpatient Medications:   •  potassium chloride SA (KDUR) 20 MEQ Tab CR, TAKE 1 TABLET BY MOUTH EVERY DAY, Disp: 90 Tab, Rfl: 3  •  hydrOXYzine HCl (ATARAX) 10 MG Tab, Take 1 Tab by mouth 3 times a day as needed for Itching., Disp: 30 Tab, Rfl: 0  •   doxazosin (CARDURA) 2 MG Tab, TAKE 1 TABLET BY MOUTH EVERY DAY, Disp: 90 Tab, Rfl: 3  •  furosemide (LASIX) 20 MG Tab, Take 10 mg by mouth 2 times a day. Pt takes @@ 0600 and 1100, Disp: , Rfl:   •  albuterol 108 (90 Base) MCG/ACT Aero Soln inhalation aerosol, Inhale 2 Puffs by mouth every 6 hours as needed for Shortness of Breath., Disp: 8.5 g, Rfl: 0  •  Dexlansoprazole (DEXILANT) 60 MG CAPSULE DELAYED RELEASE delayed-release capsule, Take 60 mg by mouth every bedtime., Disp: , Rfl:   •  famotidine (PEPCID) 40 MG Tab, Take 40 mg by mouth every bedtime., Disp: , Rfl:   •  Probiotic Product (PROBIOTIC PO), Take 1 Cap by mouth every day., Disp: , Rfl:   •  CALCIUM-MAGNESIUM-ZINC PO, Take 1 Tab by mouth every day., Disp: , Rfl:   •  carvedilol (COREG) 25 MG Tab, Take 25 mg by mouth 2 times a day, with meals., Disp: , Rfl:   •  Somatropin 10 MG Recon Soln, Inject 0.3 mg as instructed every bedtime., Disp: , Rfl:   •  losartan (COZAAR) 100 MG Tab, Take 1 Tab by mouth every day., Disp: 30 Tab, Rfl: 2  •  DULoxetine (CYMBALTA) 60 MG Cap DR Particles delayed-release capsule, Take 60 mg by mouth every evening., Disp: , Rfl:   •  Frovatriptan Succinate (FROVA) 2.5 MG Tab, Take 2.5 mg by mouth as needed (For migraines)., Disp: , Rfl:   •  montelukast (SINGULAIR) 10 MG Tab, Take 10 mg by mouth every evening., Disp: , Rfl:   •  pantoprazole (PROTONIX) 40 MG Tablet Delayed Response, Take 40 mg by mouth 2 times a day., Disp: , Rfl:   •  tizanidine (ZANAFLEX) 4 MG Tab, Take 2-4 mg by mouth every 6 hours as needed. Indications: Muscle Spasticity, Disp: , Rfl:   •  calcitonin, salmon, (MIACALCIN) 200 UNIT/ACT Solution, Spray 1 Spray in nose every bedtime., Disp: , Rfl: 12  •  meloxicam (MOBIC) 15 MG tablet, Take 15 mg by mouth every evening., Disp: , Rfl:   •  Cyanocobalamin (B-12) 1000 MCG/ML Kit, 1,000 mcg by Injection route every 7 days. On Tue, Disp: , Rfl:   •  colesevelam (WELCHOL) 625 MG Tab, Take 625 mg by mouth 3 times a  day, with meals., Disp: , Rfl:   •  EPINEPHrine (EPIPEN) 0.3 MG/0.3ML Solution Auto-injector solution for injection, 0.3 mg by Intramuscular route Once. Indications: Life-Threatening Hypersensitivity Reaction, Disp: , Rfl:   •  cevimeline (EVOXAC) 30 MG capsule, Take 1 Cap by mouth 3 times a day., Disp: 270 Cap, Rfl: 0  •  cetirizine (KLS ALLER-HAWA) 10 MG Tab, Take 20 mg by mouth 2 times a day., Disp: , Rfl:   •  liothyronine (CYTOMEL) 25 MCG Tab, Take 25 mcg by mouth every evening., Disp: , Rfl:   •  AIMOVIG 70 MG/ML Solution Auto-injector, 1 mL by Injection route Q30 DAYS., Disp: , Rfl:   •  estradiol (ESTRACE) 0.5 MG tablet, Take 0.5 mg by mouth every day., Disp: , Rfl:   •  SYNTHROID 75 MCG Tab, Take 75 mcg by mouth Every morning on an empty stomach., Disp: , Rfl:   •  gabapentin (NEURONTIN) 400 MG Cap, Take 1 Cap by mouth 3 times a day., Disp: 180 Cap, Rfl: 3  •  magnesium oxide (MAG-OX) 400 MG Tab, Take 400 mg by mouth every evening., Disp: , Rfl:   •  Biotin 5000 MCG Cap, Take 5,000 mcg by mouth every day., Disp: , Rfl:   •  Cholecalciferol (VITAMIN D) 2000 units Cap, Take 2,000 Units by mouth every day., Disp: , Rfl:   •  Menaquinone-7 (VITAMIN K2) 100 MCG Cap, Take 1 Tab by mouth every day., Disp: , Rfl:   •  multivitamin (THERAGRAN) Tab, Take 1 Tab by mouth every day., Disp: , Rfl:       ALLERGIES  Allergies   Allergen Reactions   • Bactrim [Sulfamethoxazole W-Trimethoprim] Shortness of Breath   • Bee Venom Anaphylaxis   • Buprenorphine Anaphylaxis   • Doxycycline Hives and Shortness of Breath   • Econazole Anaphylaxis   • Floxin [Ofloxacin] Anaphylaxis, Shortness of Breath and Swelling     Cause throat swelling and difficulty breathing   • Gadolinium Derivatives Hives and Swelling     Throat swelling   • Iodine Shortness of Breath and Anaphylaxis     Throat and tongue swelling with IV contrast   • Keflex Shortness of Breath   • Levaquin Shortness of Breath     anaphlyaxis   • Levofloxacin Shortness of  "Breath   • Morphine Anaphylaxis   • Naloxone Hives     \"hives, SOB\"   • Nitrofurantoin Shortness of Breath     ...and hives     • Norco [Apap-Fd&C Yellow #10 Al Tai-Hydrocodone] Shortness of Breath     ...and hives     • Oxycodone Shortness of Breath     ...and hives     • Penicillins Shortness of Breath     ...and hives     • Tape Contact Dermatitis     Blisters, clear tegaderm ok.. No steristrips.   • Ancef [Cefazolin] Hives   • Bextra [Valdecoxib] Rash     \"Skin burn and peeling.\"   • Flagyl [Kdc:Metronidazole+Tartrazine] Hives   • Linezolid Rash     Rash all over body   • Other Drug Hives     \"Enconazole nitrate.\" shortness of breath and hives   • Other Misc Unspecified     Adhesive tape: blisters, skin peels off   • Clindamycin      HIVES     • Tygacil [Tigecycline]      itching   • Zithromax [Azithromycin] Hives and Shortness of Breath     Pt had Hives also       PHYSICAL EXAM  VITAL SIGNS: /91   Pulse (!) 107   Temp 35.8 °C (96.5 °F) (Temporal)   Resp 18   Ht 1.626 m (5' 4\")   Wt 88.6 kg (195 lb 5.2 oz)   LMP 02/07/2016 (Within Months)   SpO2 96%   BMI 33.53 kg/m²       Constitutional: Well developed, Well nourished, No acute distress, Non-toxic appearance.   HENT: Normocephalic, Atraumatic, Bilateral external ears normal, Oropharynx moist, No oral exudates, Nose normal.   Eyes: PERRLA, EOMI, Conjunctiva normal, No discharge.   Neck: Normal range of motion, No tenderness, Supple, No stridor.   Lymphatic: No lymphadenopathy noted.   Cardiovascular: Normal heart rate, Normal rhythm, No murmurs, No rubs, No gallops.   Thorax & Lungs: Normal breath sounds, No respiratory distress, No wheezing, No chest tenderness.   Abdomen: Bowel sounds normal, Soft, No tenderness, No masses, No pulsatile masses.   Skin: Warm, Dry, No erythema, No rash.   Back: No tenderness, No CVA tenderness.   Extremities: Intact distal pulses, No edema, No tenderness, No cyanosis, No clubbing.   Musculoskeletal: Good range of " motion in all major joints. No tenderness to palpation or major deformities noted.   Neurologic: Alert & oriented x 3, Normal motor function, Normal sensory function, No focal deficits noted.   Psychiatric: Affect normal, Judgment normal, Mood normal.     CT-MAXILLOFACIAL WITH PLUS RECONS   Final Result      1.  Moderate mucosal thickening of the right maxillary sinus status post bilateral antrectomies. Right antral window is widely patent.   2.  Mild mucosal thickening of the right ethmoid air cells status post partial right ethmoidectomy.   3.  The rest of the paranasal sinuses are essentially clear.        Results for orders placed or performed during the hospital encounter of 06/28/20   Comp Metabolic Panel   Result Value Ref Range    Sodium 140 135 - 145 mmol/L    Potassium 3.7 3.6 - 5.5 mmol/L    Chloride 102 96 - 112 mmol/L    Co2 21 20 - 33 mmol/L    Anion Gap 17.0 (H) 7.0 - 16.0    Glucose 96 65 - 99 mg/dL    Bun 18 8 - 22 mg/dL    Creatinine 1.23 0.50 - 1.40 mg/dL    Calcium 9.1 8.4 - 10.2 mg/dL    AST(SGOT) 33 12 - 45 U/L    ALT(SGPT) 30 2 - 50 U/L    Alkaline Phosphatase 82 30 - 99 U/L    Total Bilirubin 0.3 0.1 - 1.5 mg/dL    Albumin 4.2 3.2 - 4.9 g/dL    Total Protein 7.5 6.0 - 8.2 g/dL    Globulin 3.3 1.9 - 3.5 g/dL    A-G Ratio 1.3 g/dL   CBC WITH DIFFERENTIAL   Result Value Ref Range    WBC 7.0 4.8 - 10.8 K/uL    RBC 5.46 (H) 4.20 - 5.40 M/uL    Hemoglobin 14.9 12.0 - 16.0 g/dL    Hematocrit 48.1 (H) 37.0 - 47.0 %    MCV 88.1 81.4 - 97.8 fL    MCH 27.3 27.0 - 33.0 pg    MCHC 31.0 (L) 33.6 - 35.0 g/dL    RDW 58.3 (H) 35.9 - 50.0 fL    Platelet Count 289 164 - 446 K/uL    MPV 10.3 9.0 - 12.9 fL    Neutrophils-Polys 61.50 44.00 - 72.00 %    Lymphocytes 25.30 22.00 - 41.00 %    Monocytes 11.40 0.00 - 13.40 %    Eosinophils 1.10 0.00 - 6.90 %    Basophils 0.30 0.00 - 1.80 %    Immature Granulocytes 0.40 0.00 - 0.90 %    Nucleated RBC 0.00 /100 WBC    Neutrophils (Absolute) 4.28 2.00 - 7.15 K/uL    Lymphs  (Absolute) 1.76 1.00 - 4.80 K/uL    Monos (Absolute) 0.79 0.00 - 0.85 K/uL    Eos (Absolute) 0.08 0.00 - 0.51 K/uL    Baso (Absolute) 0.02 0.00 - 0.12 K/uL    Immature Granulocytes (abs) 0.03 0.00 - 0.11 K/uL    NRBC (Absolute) 0.00 K/uL   ESTIMATED GFR   Result Value Ref Range    GFR If  55 (A) >60 mL/min/1.73 m 2    GFR If Non African American 45 (A) >60 mL/min/1.73 m 2        COURSE & MEDICAL DECISION MAKING  Pertinent Labs & Imaging studies reviewed. (See chart for details)  The patient's visit history.  Her operative report was reviewed as well.  Laboratory studies are reassuring.  She apparently has a contrast intolerance but not severe allergy.  She was given Solu-Medrol Benadryl and tolerated the scan without any problems.  Only the patient cannot tolerate any oral antibiotic.  I would say her sinusitis findings are very mild on CT and she has no fever or white blood cell count.  She would like to hold off on any systemic antibiotics as she reports that she has some gentamicin nasal wash.  I will give her a small Medrol dose pack and advised her to return in 12 to 24 hours if symptoms progress or worsen    FINAL IMPRESSION  1. Right maxillary sinusitis, chronic       Electronically signed by: Vitaliy Grant M.D., 6/28/2020 7:41 AM

## 2020-07-07 RX ORDER — FOSFOMYCIN TROMETHAMINE 3 G/1
POWDER ORAL
COMMUNITY
Start: 2020-06-20 | End: 2020-07-07

## 2020-07-07 RX ORDER — ONDANSETRON 4 MG/1
TABLET, FILM COATED ORAL
Status: ON HOLD | COMMUNITY
Start: 2020-07-01 | End: 2020-08-19

## 2020-07-07 RX ORDER — PARICALCITOL 1 UG/1
CAPSULE, LIQUID FILLED ORAL
COMMUNITY
Start: 2020-07-01 | End: 2020-07-07

## 2020-07-07 RX ORDER — ERENUMAB-AOOE 140 MG/ML
INJECTION, SOLUTION SUBCUTANEOUS
COMMUNITY
Start: 2020-06-19 | End: 2020-07-07

## 2020-07-07 RX ORDER — TRAMADOL HYDROCHLORIDE 50 MG/1
50-100 TABLET ORAL EVERY 6 HOURS PRN
COMMUNITY
Start: 2020-07-01 | End: 2021-03-16

## 2020-07-07 ASSESSMENT — FIBROSIS 4 INDEX: FIB4 SCORE: 1.17

## 2020-07-07 NOTE — OR NURSING
"Preadmit appointment: \" Preparing for your Procedure information\" sheet given to patient with verbal and written instructions. Patient instructed to continue prescribed medications through the day before surgery, instructed to take the following medications the day of surgery per anesthesia protocol:  Tylenol PRN, Coreg, Cetirizine, Cardura, neutontin, Cytomel, Protonix, Sythroid, Tramadol PRN.   Pt states, \"no issues with anesthesia\".  Anesthesia Fasting guidelines handout given to Pt, NPO 8 hours prior to surgery and clear liquids up to 2 hours prior to surgery, clear liquids defined for Pt    All Pt's questions and concerns answered or addressed.  COVID test 7/10.      Pt has Multiple Allergies!  "

## 2020-07-10 ENCOUNTER — OFFICE VISIT (OUTPATIENT)
Dept: ADMISSIONS | Facility: MEDICAL CENTER | Age: 55
End: 2020-07-10
Attending: ORTHOPAEDIC SURGERY
Payer: MEDICARE

## 2020-07-10 DIAGNOSIS — Z01.812 PRE-OPERATIVE LABORATORY EXAMINATION: ICD-10-CM

## 2020-07-10 LAB — COVID ORDER STATUS COVID19: NORMAL

## 2020-07-10 PROCEDURE — U0003 INFECTIOUS AGENT DETECTION BY NUCLEIC ACID (DNA OR RNA); SEVERE ACUTE RESPIRATORY SYNDROME CORONAVIRUS 2 (SARS-COV-2) (CORONAVIRUS DISEASE [COVID-19]), AMPLIFIED PROBE TECHNIQUE, MAKING USE OF HIGH THROUGHPUT TECHNOLOGIES AS DESCRIBED BY CMS-2020-01-R: HCPCS

## 2020-07-11 LAB
SARS-COV-2 RNA RESP QL NAA+PROBE: NOTDETECTED
SPECIMEN SOURCE: NORMAL

## 2020-07-13 ENCOUNTER — ANESTHESIA EVENT (OUTPATIENT)
Dept: SURGERY | Facility: MEDICAL CENTER | Age: 55
End: 2020-07-13
Payer: MEDICARE

## 2020-07-14 ENCOUNTER — ANESTHESIA (OUTPATIENT)
Dept: SURGERY | Facility: MEDICAL CENTER | Age: 55
End: 2020-07-14
Payer: MEDICARE

## 2020-07-14 ENCOUNTER — HOSPITAL ENCOUNTER (OUTPATIENT)
Facility: MEDICAL CENTER | Age: 55
End: 2020-07-14
Attending: ORTHOPAEDIC SURGERY | Admitting: ORTHOPAEDIC SURGERY
Payer: MEDICARE

## 2020-07-14 ENCOUNTER — APPOINTMENT (OUTPATIENT)
Dept: RADIOLOGY | Facility: MEDICAL CENTER | Age: 55
End: 2020-07-14
Attending: ORTHOPAEDIC SURGERY
Payer: MEDICARE

## 2020-07-14 VITALS
HEART RATE: 86 BPM | SYSTOLIC BLOOD PRESSURE: 126 MMHG | HEIGHT: 64 IN | RESPIRATION RATE: 16 BRPM | OXYGEN SATURATION: 93 % | DIASTOLIC BLOOD PRESSURE: 86 MMHG | WEIGHT: 200.4 LBS | BODY MASS INDEX: 34.21 KG/M2 | TEMPERATURE: 97.3 F

## 2020-07-14 LAB
GRAM STN SPEC: NORMAL
SIGNIFICANT IND 70042: NORMAL
SITE SITE: NORMAL
SOURCE SOURCE: NORMAL

## 2020-07-14 PROCEDURE — C1713 ANCHOR/SCREW BN/BN,TIS/BN: HCPCS | Performed by: ORTHOPAEDIC SURGERY

## 2020-07-14 PROCEDURE — 160035 HCHG PACU - 1ST 60 MINS PHASE I: Performed by: ORTHOPAEDIC SURGERY

## 2020-07-14 PROCEDURE — 700111 HCHG RX REV CODE 636 W/ 250 OVERRIDE (IP): Performed by: ANESTHESIOLOGY

## 2020-07-14 PROCEDURE — 160025 RECOVERY II MINUTES (STATS): Performed by: ORTHOPAEDIC SURGERY

## 2020-07-14 PROCEDURE — 501445 HCHG STAPLER, SKIN DISP: Performed by: ORTHOPAEDIC SURGERY

## 2020-07-14 PROCEDURE — 700101 HCHG RX REV CODE 250: Performed by: ORTHOPAEDIC SURGERY

## 2020-07-14 PROCEDURE — 160036 HCHG PACU - EA ADDL 30 MINS PHASE I: Performed by: ORTHOPAEDIC SURGERY

## 2020-07-14 PROCEDURE — 64445 NJX AA&/STRD SCIATIC NRV IMG: CPT | Performed by: ORTHOPAEDIC SURGERY

## 2020-07-14 PROCEDURE — 700101 HCHG RX REV CODE 250: Performed by: ANESTHESIOLOGY

## 2020-07-14 PROCEDURE — 160046 HCHG PACU - 1ST 60 MINS PHASE II: Performed by: ORTHOPAEDIC SURGERY

## 2020-07-14 PROCEDURE — 87075 CULTR BACTERIA EXCEPT BLOOD: CPT

## 2020-07-14 PROCEDURE — 87070 CULTURE OTHR SPECIMN AEROBIC: CPT

## 2020-07-14 PROCEDURE — 501838 HCHG SUTURE GENERAL: Performed by: ORTHOPAEDIC SURGERY

## 2020-07-14 PROCEDURE — 160002 HCHG RECOVERY MINUTES (STAT): Performed by: ORTHOPAEDIC SURGERY

## 2020-07-14 PROCEDURE — A6454 SELF-ADHER BAND W>=3" <5"/YD: HCPCS | Performed by: ORTHOPAEDIC SURGERY

## 2020-07-14 PROCEDURE — 160048 HCHG OR STATISTICAL LEVEL 1-5: Performed by: ORTHOPAEDIC SURGERY

## 2020-07-14 PROCEDURE — 160041 HCHG SURGERY MINUTES - EA ADDL 1 MIN LEVEL 4: Performed by: ORTHOPAEDIC SURGERY

## 2020-07-14 PROCEDURE — 160009 HCHG ANES TIME/MIN: Performed by: ORTHOPAEDIC SURGERY

## 2020-07-14 PROCEDURE — 500881 HCHG PACK, EXTREMITY: Performed by: ORTHOPAEDIC SURGERY

## 2020-07-14 PROCEDURE — 87205 SMEAR GRAM STAIN: CPT

## 2020-07-14 PROCEDURE — 160029 HCHG SURGERY MINUTES - 1ST 30 MINS LEVEL 4: Performed by: ORTHOPAEDIC SURGERY

## 2020-07-14 PROCEDURE — 700105 HCHG RX REV CODE 258: Performed by: ORTHOPAEDIC SURGERY

## 2020-07-14 PROCEDURE — 73630 X-RAY EXAM OF FOOT: CPT | Mod: RT

## 2020-07-14 PROCEDURE — 502000 HCHG MISC OR IMPLANTS RC 0278: Performed by: ORTHOPAEDIC SURGERY

## 2020-07-14 PROCEDURE — 502240 HCHG MISC OR SUPPLY RC 0272: Performed by: ORTHOPAEDIC SURGERY

## 2020-07-14 DEVICE — GRAFT BONE VIABLE BONE MATRIX BIO4 5 CC: Type: IMPLANTABLE DEVICE | Site: FOOT | Status: FUNCTIONAL

## 2020-07-14 RX ORDER — CEFAZOLIN SODIUM 1 G/3ML
INJECTION, POWDER, FOR SOLUTION INTRAMUSCULAR; INTRAVENOUS PRN
Status: DISCONTINUED | OUTPATIENT
Start: 2020-07-14 | End: 2020-07-14 | Stop reason: SURG

## 2020-07-14 RX ORDER — MIDAZOLAM HYDROCHLORIDE 1 MG/ML
1 INJECTION INTRAMUSCULAR; INTRAVENOUS
Status: DISCONTINUED | OUTPATIENT
Start: 2020-07-14 | End: 2020-07-14 | Stop reason: HOSPADM

## 2020-07-14 RX ORDER — KETOROLAC TROMETHAMINE 30 MG/ML
30 INJECTION, SOLUTION INTRAMUSCULAR; INTRAVENOUS EVERY 6 HOURS PRN
Status: DISCONTINUED | OUTPATIENT
Start: 2020-07-14 | End: 2020-07-14 | Stop reason: HOSPADM

## 2020-07-14 RX ORDER — LABETALOL HYDROCHLORIDE 5 MG/ML
5 INJECTION, SOLUTION INTRAVENOUS
Status: DISCONTINUED | OUTPATIENT
Start: 2020-07-14 | End: 2020-07-14 | Stop reason: HOSPADM

## 2020-07-14 RX ORDER — ROPIVACAINE HYDROCHLORIDE 5 MG/ML
INJECTION, SOLUTION EPIDURAL; INFILTRATION; PERINEURAL
Status: COMPLETED | OUTPATIENT
Start: 2020-07-14 | End: 2020-07-14

## 2020-07-14 RX ORDER — KETOROLAC TROMETHAMINE 30 MG/ML
INJECTION, SOLUTION INTRAMUSCULAR; INTRAVENOUS
Status: DISCONTINUED
Start: 2020-07-14 | End: 2020-07-14 | Stop reason: HOSPADM

## 2020-07-14 RX ORDER — SODIUM CHLORIDE, SODIUM LACTATE, POTASSIUM CHLORIDE, CALCIUM CHLORIDE 600; 310; 30; 20 MG/100ML; MG/100ML; MG/100ML; MG/100ML
INJECTION, SOLUTION INTRAVENOUS CONTINUOUS
Status: DISCONTINUED | OUTPATIENT
Start: 2020-07-14 | End: 2020-07-14 | Stop reason: HOSPADM

## 2020-07-14 RX ORDER — ONDANSETRON 2 MG/ML
4 INJECTION INTRAMUSCULAR; INTRAVENOUS
Status: DISCONTINUED | OUTPATIENT
Start: 2020-07-14 | End: 2020-07-14 | Stop reason: HOSPADM

## 2020-07-14 RX ORDER — KETAMINE HYDROCHLORIDE 50 MG/ML
INJECTION, SOLUTION INTRAMUSCULAR; INTRAVENOUS PRN
Status: DISCONTINUED | OUTPATIENT
Start: 2020-07-14 | End: 2020-07-14 | Stop reason: SURG

## 2020-07-14 RX ORDER — ONDANSETRON 2 MG/ML
INJECTION INTRAMUSCULAR; INTRAVENOUS PRN
Status: DISCONTINUED | OUTPATIENT
Start: 2020-07-14 | End: 2020-07-14 | Stop reason: SURG

## 2020-07-14 RX ORDER — TRAMADOL HYDROCHLORIDE 50 MG/1
50 TABLET ORAL EVERY 6 HOURS PRN
Status: DISCONTINUED | OUTPATIENT
Start: 2020-07-14 | End: 2020-07-14 | Stop reason: HOSPADM

## 2020-07-14 RX ORDER — HALOPERIDOL 5 MG/ML
1 INJECTION INTRAMUSCULAR
Status: DISCONTINUED | OUTPATIENT
Start: 2020-07-14 | End: 2020-07-14 | Stop reason: HOSPADM

## 2020-07-14 RX ORDER — DEXAMETHASONE SODIUM PHOSPHATE 4 MG/ML
INJECTION, SOLUTION INTRA-ARTICULAR; INTRALESIONAL; INTRAMUSCULAR; INTRAVENOUS; SOFT TISSUE
Status: COMPLETED | OUTPATIENT
Start: 2020-07-14 | End: 2020-07-14

## 2020-07-14 RX ORDER — MIDAZOLAM HYDROCHLORIDE 1 MG/ML
INJECTION INTRAMUSCULAR; INTRAVENOUS PRN
Status: DISCONTINUED | OUTPATIENT
Start: 2020-07-14 | End: 2020-07-14 | Stop reason: SURG

## 2020-07-14 RX ORDER — DIPHENHYDRAMINE HYDROCHLORIDE 50 MG/ML
12.5 INJECTION INTRAMUSCULAR; INTRAVENOUS
Status: DISCONTINUED | OUTPATIENT
Start: 2020-07-14 | End: 2020-07-14 | Stop reason: HOSPADM

## 2020-07-14 RX ADMIN — KETOROLAC TROMETHAMINE 30 MG: 30 INJECTION, SOLUTION INTRAMUSCULAR at 09:56

## 2020-07-14 RX ADMIN — CEFAZOLIN 2 G: 1 INJECTION, POWDER, FOR SOLUTION INTRAVENOUS at 07:30

## 2020-07-14 RX ADMIN — LIDOCAINE HYDROCHLORIDE 0.5 ML: 10 INJECTION, SOLUTION INFILTRATION; PERINEURAL at 06:00

## 2020-07-14 RX ADMIN — MIDAZOLAM HYDROCHLORIDE 4 MG: 1 INJECTION, SOLUTION INTRAMUSCULAR; INTRAVENOUS at 07:13

## 2020-07-14 RX ADMIN — DEXAMETHASONE SODIUM PHOSPHATE 8 MG: 4 INJECTION, SOLUTION INTRAMUSCULAR; INTRAVENOUS at 07:30

## 2020-07-14 RX ADMIN — FENTANYL CITRATE 150 MCG: 50 INJECTION, SOLUTION INTRAMUSCULAR; INTRAVENOUS at 07:30

## 2020-07-14 RX ADMIN — DEXAMETHASONE SODIUM PHOSPHATE 2 MG: 4 INJECTION, SOLUTION INTRAMUSCULAR; INTRAVENOUS at 07:14

## 2020-07-14 RX ADMIN — ONDANSETRON 4 MG: 2 INJECTION INTRAMUSCULAR; INTRAVENOUS at 08:16

## 2020-07-14 RX ADMIN — FENTANYL CITRATE 50 MCG: 50 INJECTION, SOLUTION INTRAMUSCULAR; INTRAVENOUS at 08:14

## 2020-07-14 RX ADMIN — ROPIVACAINE HYDROCHLORIDE 20 ML: 5 INJECTION, SOLUTION EPIDURAL; INFILTRATION; PERINEURAL at 07:14

## 2020-07-14 RX ADMIN — KETAMINE HYDROCHLORIDE 50 MG: 50 INJECTION INTRAMUSCULAR; INTRAVENOUS at 07:30

## 2020-07-14 RX ADMIN — ROPIVACAINE HYDROCHLORIDE 20 ML: 5 INJECTION, SOLUTION EPIDURAL; INFILTRATION; PERINEURAL at 08:16

## 2020-07-14 RX ADMIN — PROPOFOL 300 MG: 10 INJECTION, EMULSION INTRAVENOUS at 07:30

## 2020-07-14 RX ADMIN — SODIUM CHLORIDE, POTASSIUM CHLORIDE, SODIUM LACTATE AND CALCIUM CHLORIDE 1000 ML: 600; 310; 30; 20 INJECTION, SOLUTION INTRAVENOUS at 06:01

## 2020-07-14 ASSESSMENT — FIBROSIS 4 INDEX: FIB4 SCORE: 1.17

## 2020-07-14 ASSESSMENT — PAIN SCALES - GENERAL: PAIN_LEVEL: 2

## 2020-07-14 NOTE — OR NURSING
550-Patient allergies and NPO status verified, home medication reconciliation completed and belongings secured. Patient verbalizes understanding of pain scale, expected course of stay and plan of care. Surgical site verified with patient. Patient and family given home care instructions sheet for discharge planning. IV access established. Sequentials placed on left leg.

## 2020-07-14 NOTE — OP REPORT
DATE OF OPERATION: 7/14/2020     SURGEON: Wm Librado Mercedes M.D.    ANESTHESIOLOGIST: Anesthesiologist: Prashanth Barrett M.D.    SURGICAL FIRST ASST: IgnaciaMINISTERIO Rosas    PREOPERATIVE DIAGNOSIS:   Prior right triple arthrodesis with incomplete union    POSTOPERATIVE DIAGNOSIS:   Same    PROCEDURE:   Redo right triple arthrodesis  Removal of deep hardware from right talonavicular and calcaneocuboid joints     EQUIPMENT USED:   S[employer] Bio4 bone graft augmentation, [place] hardware    DETAILS OF PROCEDURE: I met with the patient in the preop holding area and Skin Skribe the operative site.  We discussed the procedure as well as risk and postoperative instructions and informed consent was obtained.  She received a sciatic level block including saphenous distribution for postoperative pain management and intraoperatively a general anesthetic was initiated.  She was placed full lateral on the beanbag once under general anesthesia with a thigh tourniquet in place and a prep and drape followed and then a timeout including confirmation of IV antibiotics within an hour surgical cut time as well as available equipment and operative plan.  All dissection was performed loupe magnification and fluoroscopy was present through the procedure and utilized as indicated.    I opened the lateral incision carried this down to the subtalar joint as well as the calcaneocuboid joint and the lateral aspect of the talonavicular joint.  I remove the single screw at the calcaneocuboid joint and then the plate and screw construct at the talonavicular joint along the lateral aspect.  There was gross motion at both of these joints.  I dissected into the region of the sinus Tarsi and noted no motion at the subtalar joint but did include this area of the joint and the fusion based upon the concern noted on CT it.  The talonavicular joint was prepared along the lateral aspect as well as the 4 corner region of the talonavicular and  calcaneocuboid joint and the calcaneocuboid joint and the aforementioned subtalar joint.  I removed tissue from both the talonavicular and calcaneocuboid joint and submitted this for culture although there was certainly no evidence of infection.  The high-speed resurfacing tool was used to prepare the fusion and all fibrous material was removed.  I backfilled with the bone graft augmentation as well as local bone graft throughout and then applied a plate at the calcaneocuboid joint.  The bone quality was somewhat soft but I was able to secure fit of the hardware.  The wound was irrigated and closed with layered deep Vicryl followed by a more superficial layer of Vicryl then subcuticular Monocryl and skin staples.    While preserving the operative field the patient was then rolled into the supine position with upper extremity positioning by anesthesia nursing.  I opened the medial talonavicular joint and prepare this joint for fusion in a similar fashion as already dictated.  I then placed a 4 hole locking plate with fluoroscopy guidance across the talonavicular joint.  There was no longer motion at this joint and the calcaneocuboid joint after plating was also noted to not have motion of.  Closure was performed in a similar fashion and a dressing and splint followed up.    Utilization of surgical first assist MINISTERIO Yanez was necessary for patient positioning as well as safety and retracting it.  She assisted throughout the procedure with instrumentation and assisted with the final layer of skin closure as well as application of the dressing and splint.  She was present the entire procedure.      ____________________________________    Librado Mercedes M.D.

## 2020-07-14 NOTE — ANESTHESIA TIME REPORT
Anesthesia Start and Stop Event Times     Date Time Event    7/14/2020 0701 Ready for Procedure     0727 Anesthesia Start     0940 Anesthesia Stop        Responsible Staff  07/14/20    Name Role Begin End    Prashanth Barrett M.D. Anesth 0727 0940        Preop Diagnosis (Free Text):  Pre-op Diagnosis     LOCALIZED PRIMARY OSTEOARTHRITIS OF THE ANKLE AND/OR FOOOT        Preop Diagnosis (Codes):    Post op Diagnosis  Localized osteoarthritis of right ankle      Premium Reason  Non-Premium    Comments:

## 2020-07-14 NOTE — DISCHARGE INSTRUCTIONS
ACTIVITY: Rest and take it easy for the first 24 hours.  A responsible adult is recommended to remain with you during that time.  It is normal to feel sleepy.  We encourage you to not do anything that requires balance, judgment or coordination.    MILD FLU-LIKE SYMPTOMS ARE NORMAL. YOU MAY EXPERIENCE GENERALIZED MUSCLE ACHES, THROAT IRRITATION, HEADACHE AND/OR SOME NAUSEA.    FOR 24 HOURS DO NOT:  Drive, operate machinery or run household appliances.  Drink beer or alcoholic beverages.   Make important decisions or sign legal documents.    SPECIAL INSTRUCTIONS: May shower with dressing covered, NON weight bearing on right foot , crutch or scooter use     DIET: To avoid nausea, slowly advance diet as tolerated, avoiding spicy or greasy foods for the first day.  Add more substantial food to your diet according to your physician's instructions.   INCREASE FLUIDS AND FIBER TO AVOID CONSTIPATION.    SURGICAL DRESSING/BATHING: Keep dressing clean dry and intact until further notified Dr Mercedes   FOLLOW-UP APPOINTMENT:  A follow-up appointment has been pre arrenged     You should CALL YOUR PHYSICIAN if you develop:  Fever greater than 101 degrees F.  Pain not relieved by medication, or persistent nausea or vomiting.  Excessive bleeding (blood soaking through dressing) or unexpected drainage from the wound.  Extreme redness or swelling around the incision site, drainage of pus or foul smelling drainage.  Inability to urinate or empty your bladder within 8 hours.  Problems with breathing or chest pain.    You should call 911 if you develop problems with breathing or chest pain.  If you are unable to contact your doctor or surgical center, you should go to the nearest emergency room or urgent care center.  Physician's telephone #: Dr Mercedes [phone]    If any questions arise, call your doctor.  If your doctor is not available, please feel free to call the Surgical Center at [phone].  The Center is open Monday through  Friday from 7AM to 7PM.  You can also call the HEALTH HOTLINE open 24 hours/day, 7 days/week and speak to a nurse at [phone], or toll free at [phone].    A registered nurse may call you a few days after your surgery to see how you are doing after your procedure.    MEDICATIONS: Resume taking daily medication.  Take prescribed pain medication with food.  If no medication is prescribed, you may take non-aspirin pain medication if needed.  PAIN MEDICATION CAN BE VERY CONSTIPATING.  Take a stool softener or laxative such as senokot, pericolace, or milk of magnesia if needed.    Prescription given prior to surgery  Last pain medication given at 1000 TOrdol IV  30 mg. .    If your physician has prescribed pain medication that includes Acetaminophen (Tylenol), do not take additional Acetaminophen (Tylenol) while taking the prescribed medication.    Depression / Suicide Risk    As you are discharged from this [facility] facility, it is important to learn how to keep safe from harming yourself.    Recognize the warning signs:  · Abrupt changes in personality, positive or negative- including increase in energy   · Giving away possessions  · Change in eating patterns- significant weight changes-  positive or negative  · Change in sleeping patterns- unable to sleep or sleeping all the time   · Unwillingness or inability to communicate  · Depression  · Unusual sadness, discouragement and loneliness  · Talk of wanting to die  · Neglect of personal appearance   · Rebelliousness- reckless behavior  · Withdrawal from people/activities they love  · Confusion- inability to concentrate     If you or a loved one observes any of these behaviors or has concerns about self-harm, here's what you can do:  · Talk about it- your feelings and reasons for harming yourself  · Remove any means that you might use to hurt yourself (examples: pills, rope, extension cords, firearm)  · Get professional help from the community (Mental  Health, Substance Abuse, psychological counseling)  · Do not be alone:Call your Safe Contact- someone whom you trust who will be there for you.  · Call your local CRISIS HOTLINE 727-8462 or 909-265-8350  · Call your local Children's Mobile Crisis Response Team [place] (678) 515-6937 or www.[employer]Vill[employer]  · Call the toll free National Suicide Prevention Hotlines   · National Suicide Prevention Lifeline 942-705-LUEA (4670)  [employer] 800-SUICIDE (739-9497)    Peripheral Nerve Block Discharge Instructions from Same Day Surgery and Inpatient :    What to Expect - Lower Extremity  · The block may cause you to experience numbness and weakness in your {LEG LOCATION [id number]} on the same side as your surgery  · Numbness, tingling and / or weakness are all normal. For some people, this may be an unpleasant sensation  · These issues will be resolved when the local anesthetic wears off   · You may experience numbness and tingling in your thigh on the same side as your surgery if the block medicine was injected at your groin area  · Numbness will make it difficult to walk  · You may have problems with balance and walking so be very careful   · Follow your surgeon's direction regarding weight bearing on your surgical limb  · Be very careful with your numb limb  Precautions  · The numbness may affect your balance  · Be careful when walking or moving around  · Your leg may be weak: be very careful putting weight on it  · If your surgeon did not specify a time, you should not bear weight for 24 hours  · Be sure to ask for help when you need it  · It is better to have help than to fall and hurt yourself  · You may feel an aching or burning when the local anesthesia starts to wear off  · Take pain pills as prescribed by your surgeon  · Call your surgeon or anesthesiologist if you do not have adequate pain control  ·

## 2020-07-14 NOTE — OR NURSING
1047-Pt arrived STG2 recovery. Assisted to dress and into recliner. Tolerated well. Denies pain or nausea at present. VSS. Dsg to R ankle CDI. Splint in place. Crutches at home.   Family to chairside.    D/c instructions given with good understanding.  Tolerates po flds well.  NAD  Pt d/cd to family via WC.    Pt ambulatory to BR, + U/O.

## 2020-07-14 NOTE — ANESTHESIA PROCEDURE NOTES
Airway    Date/Time: 7/14/2020 7:31 AM  Performed by: Prashanth Barrett M.D.  Authorized by: Prashanth Barrett M.D.     Location:  OR  Urgency:  Elective  Difficult Airway: No    Indications for Airway Management:  Anesthesia      Spontaneous Ventilation: absent    Sedation Level:  Deep  Preoxygenated: Yes    MILS Maintained Throughout: No    Mask Difficulty Assessment:  0 - not attempted  Final Airway Type:  Supraglottic airway  Final Supraglottic Airway:  Standard LMA    SGA Size:  4  Number of Attempts at Approach:  1

## 2020-07-14 NOTE — ANESTHESIA PROCEDURE NOTES
Peripheral Block    Date/Time: 7/14/2020 7:14 AM  Performed by: Prashanth Barrett M.D.  Authorized by: Prashanth Barrett M.D.     Patient Location:  Pre-op  Start Time:  7/14/2020 7:14 AM  End Time:  7/14/2020 7:15 AM  Reason for Block: at surgeon's request and post-op pain management    patient identified, IV checked, site marked, risks and benefits discussed, surgical consent, monitors and equipment checked, pre-op evaluation and timeout performed    Patient Position:  Supine  Prep: ChloraPrep    Monitoring:  Heart rate, continuous pulse ox and cardiac monitor  Block Region:  Lower Extremity  Lower Extremity - Block Type:  SCIATIC nerve block, lateral approach    Laterality:  Right  Procedures: ultrasound guided and nerve stimulator  Image captured, interpreted and electronically stored.  Local Infiltration:  Lidocaine  Strength:  2 %  Dose:  3 ml  Block Type:  Single-shot  Needle Length:  100mm  Needle Gauge:  21 G  Needle Localization:  Ultrasound guidance and nerve stimulator  Injection Assessment:  Negative aspiration for heme, no paresthesia on injection, incremental injection and local visualized surrounding nerve on ultrasound  Evidence of intravascular injection: No     US Guided Sciatic Nerve Block   US probe placed several cm proximal to popliteal crease on posterior thigh and scanned caudad and cephalad until Sciatic Nerve (SN) identified superficial/lateral to popliteal artery.  Needle inserted lateral to probe in an in plane approach under direct visualization to a perineural position.  After negative aspiration LA injected with ease and visualized surrounding the SN. Injected deep and superficial to the nerve. Printed and electronic images saved.

## 2020-07-14 NOTE — OR NURSING
0936 Into pacu via gurney , no airway, mask o2 at 5 L. Head of g;urney elevated to 30 degrees. Arouses to verbal stim rt foot with soft cast and ace wrap, able to wiggle toes, cap refill q 3 sec return, pt states has some pain but tolerable. Ice to rt foot which is elevated onto foam , pt denies nausea. resp unlabored and spontaneous.   0950 pt chatting and telling small jokes, states has little pain but tolerable and is able to take nsaids without incident.   1000 medicated with iv tordol which pt stated she could take, pain she can feel but very tolerable   1010 pt talking and talking, sats drop mid 80's, o2 nasal cannula placed on encouraged pt to rest.    1015 finger pulse ox changed as sats not accurate   1020 o2 down to 1 L.   1035report to dakota parham.

## 2020-07-14 NOTE — ANESTHESIA POSTPROCEDURE EVALUATION
Patient: Donna Isaac    Procedure Summary     Date:  07/14/20 Room / Location:   OR  / SURGERY [facility]    Anesthesia Start:  0727 Anesthesia Stop:  0940    Procedure:  FUSION, JOINT, HINDFOOT, TRIPLE - WITH POSSIBLE GASTROC RECESSION (Right Foot) Diagnosis:  (LOCALIZED PRIMARY OSTEOARTHRITIS OF THE ANKLE AND/OR FOOOT)    Surgeon:  Wm Libraod Mercedes M.D. Responsible Provider:  Prashanth Barrett M.D.    Anesthesia Type:  general, peripheral nerve block ASA Status:  3          Final Anesthesia Type: general, peripheral nerve block  Last vitals  BP   Blood Pressure: 136/101    Temp   36 °C (96.8 °F)    Pulse   Pulse: (!) 102   Resp   16    SpO2   98 %      Anesthesia Post Evaluation    Patient location during evaluation: PACU  Patient participation: complete - patient participated  Level of consciousness: awake and alert  Pain score: 2    Airway patency: patent  Anesthetic complications: no  Cardiovascular status: hemodynamically stable  Respiratory status: acceptable  Hydration status: euvolemic    PONV: none           Nurse Pain Score: 3 (NPRS)

## 2020-07-30 ENCOUNTER — HOSPITAL ENCOUNTER (OUTPATIENT)
Dept: RADIOLOGY | Facility: MEDICAL CENTER | Age: 55
End: 2020-07-30
Attending: ORTHOPAEDIC SURGERY | Admitting: ORTHOPAEDIC SURGERY
Payer: MEDICARE

## 2020-07-30 ENCOUNTER — HOSPITAL ENCOUNTER (OUTPATIENT)
Facility: MEDICAL CENTER | Age: 55
End: 2020-07-30
Attending: NURSE PRACTITIONER
Payer: MEDICARE

## 2020-07-30 DIAGNOSIS — M79.89 RIGHT LEG SWELLING: ICD-10-CM

## 2020-07-30 DIAGNOSIS — R22.41 KNEE MASS, RIGHT: ICD-10-CM

## 2020-07-30 LAB
C DIFF DNA SPEC QL NAA+PROBE: NEGATIVE
C DIFF TOX A+B STL QL IA: NEGATIVE
C DIFF TOX GENS STL QL NAA+PROBE: NORMAL

## 2020-07-30 PROCEDURE — 87324 CLOSTRIDIUM AG IA: CPT

## 2020-07-30 PROCEDURE — 87493 C DIFF AMPLIFIED PROBE: CPT

## 2020-07-30 PROCEDURE — 93971 EXTREMITY STUDY: CPT | Mod: RT

## 2020-08-04 ENCOUNTER — OFFICE VISIT (OUTPATIENT)
Dept: NEUROLOGY | Facility: MEDICAL CENTER | Age: 55
End: 2020-08-04
Payer: MEDICARE

## 2020-08-04 VITALS
DIASTOLIC BLOOD PRESSURE: 76 MMHG | HEART RATE: 75 BPM | HEIGHT: 64 IN | WEIGHT: 200 LBS | SYSTOLIC BLOOD PRESSURE: 118 MMHG | TEMPERATURE: 98.6 F | BODY MASS INDEX: 34.15 KG/M2 | OXYGEN SATURATION: 98 % | RESPIRATION RATE: 16 BRPM

## 2020-08-04 PROCEDURE — 64615 CHEMODENERV MUSC MIGRAINE: CPT | Performed by: NURSE PRACTITIONER

## 2020-08-04 RX ORDER — KETOROLAC TROMETHAMINE 30 MG/ML
60 INJECTION, SOLUTION INTRAMUSCULAR; INTRAVENOUS ONCE
Status: COMPLETED | OUTPATIENT
Start: 2020-08-04 | End: 2020-08-04

## 2020-08-04 RX ORDER — AMITRIPTYLINE HYDROCHLORIDE 10 MG/1
TABLET, FILM COATED ORAL
Qty: 90 TAB | Refills: 2 | Status: SHIPPED | OUTPATIENT
Start: 2020-08-04 | End: 2020-09-28

## 2020-08-04 RX ADMIN — KETOROLAC TROMETHAMINE 60 MG: 30 INJECTION, SOLUTION INTRAMUSCULAR; INTRAVENOUS at 10:12

## 2020-08-04 ASSESSMENT — FIBROSIS 4 INDEX: FIB4 SCORE: 1.17

## 2020-08-04 NOTE — PROGRESS NOTES
Subjective:      Donna Isaac is a 55 y.o. female who presents with Botox Injection (Chronic migraine )            HPI  Here for Botox #4 treatment today.    Migraines are improving with healing from the sinus surgery.    Current Outpatient Medications   Medication Sig Dispense Refill   • amitriptyline (ELAVIL) 10 MG Tab Take two tablets po qhs and one tablet po qam. 90 Tab 2   • tramadol (ULTRAM) 50 MG Tab TK 1 TO 2 TS PO Q 6 H PRN P FOR 7 DAYS     • ondansetron (ZOFRAN) 4 MG Tab tablet TK 1 T PO Q 6 H PRN N     • Dapagliflozin Propanediol (FARXIGA PO) Farxiga     • doxazosin (CARDURA) 2 MG Tab Take 2 mg by mouth every evening.     • acetaminophen (TYLENOL) 500 MG Tab Take 1,000 mg by mouth every 6 hours as needed for Moderate Pain.     • potassium chloride SA (KDUR) 20 MEQ Tab CR Take 20 mEq by mouth every day.     • dexamethasone (DECADRON) 0.5 MG Tab Take 0.5 mg by mouth every bedtime. Take 1 tablet by mouth every day     • hydrOXYzine HCl (ATARAX) 10 MG Tab Take 1 Tab by mouth 3 times a day as needed for Itching. (Patient taking differently: Take 25 mg by mouth 3 times a day as needed for Itching.) 30 Tab 0   • furosemide (LASIX) 20 MG Tab Take 10 mg by mouth 2 times a day. Pt takes @@ 0600 and 1200     • Dexlansoprazole (DEXILANT) 60 MG CAPSULE DELAYED RELEASE delayed-release capsule Take 60 mg by mouth every bedtime.     • famotidine (PEPCID) 40 MG Tab Take 40 mg by mouth every bedtime.     • Probiotic Product (PROBIOTIC PO) Take 1 Cap by mouth every day.     • CALCIUM-MAGNESIUM-ZINC PO Take 1 Tab by mouth every day.     • carvedilol (COREG) 25 MG Tab Take 25 mg by mouth 2 times a day, with meals.     • Somatropin 10 MG Recon Soln Inject 0.3 mg as instructed every bedtime.     • losartan (COZAAR) 100 MG Tab Take 1 Tab by mouth every day. 30 Tab 2   • DULoxetine (CYMBALTA) 60 MG Cap DR Particles delayed-release capsule Take 60 mg by mouth every evening.     • Frovatriptan Succinate (FROVA) 2.5 MG Tab  Take 2.5 mg by mouth as needed (For migraines).     • montelukast (SINGULAIR) 10 MG Tab Take 10 mg by mouth every evening.     • pantoprazole (PROTONIX) 40 MG Tablet Delayed Response Take 40 mg by mouth 2 times a day.     • tizanidine (ZANAFLEX) 4 MG Tab Take 2-4 mg by mouth every 6 hours as needed. Indications: Muscle Spasticity     • calcitonin, salmon, (MIACALCIN) 200 UNIT/ACT Solution Spray 1 Spray in nose every bedtime.  12   • meloxicam (MOBIC) 15 MG tablet Take 15 mg by mouth every evening.     • Cyanocobalamin (B-12) 1000 MCG/ML Kit 1,000 mcg by Injection route every 7 days. On Tue     • colesevelam (WELCHOL) 625 MG Tab Take 625 mg by mouth 3 times a day, with meals.     • EPINEPHrine (EPIPEN) 0.3 MG/0.3ML Solution Auto-injector solution for injection 0.3 mg by Intramuscular route Once. Indications: Life-Threatening Hypersensitivity Reaction     • cevimeline (EVOXAC) 30 MG capsule Take 1 Cap by mouth 3 times a day. 270 Cap 0   • cetirizine (KLS ALLER-HAWA) 10 MG Tab Take 20 mg by mouth 2 times a day.     • liothyronine (CYTOMEL) 25 MCG Tab Take 25 mcg by mouth every evening.     • AIMOVIG 70 MG/ML Solution Auto-injector 1 mL by Injection route Q30 DAYS.     • estradiol (ESTRACE) 0.5 MG tablet Take 0.5 mg by mouth every day.     • SYNTHROID 75 MCG Tab Take 75 mcg by mouth Every morning on an empty stomach.     • gabapentin (NEURONTIN) 400 MG Cap Take 1 Cap by mouth 3 times a day. 180 Cap 3   • magnesium oxide (MAG-OX) 400 MG Tab Take 400 mg by mouth every evening.     • Biotin 5000 MCG Cap Take 5,000 mcg by mouth every day.     • Cholecalciferol (VITAMIN D) 2000 units Cap Take 2,000 Units by mouth every day.     • multivitamin (THERAGRAN) Tab Take 1 Tab by mouth every day.       No current facility-administered medications for this visit.          ROS       Objective:     /76 (BP Location: Right arm, Patient Position: Sitting, BP Cuff Size: Adult)   Pulse 75   Temp 37 °C (98.6 °F) (Temporal)   Resp 16  "  Ht 1.62 m (5' 3.78\")   Wt 90.7 kg (200 lb)   LMP 02/07/2016 (Within Months)   SpO2 98%   BMI 34.57 kg/m²      Physical Exam            Assessment/Plan:     Chronic Migraine:  Botox therapy has reduced patient’s migraines by more than 7 days and/or 100 hours per month.  Additionally pt has noted a reduction in the amount of medication they are taking to treat their migraines and/or they are having a reduction in intractable migraine/status migrainosis which can result in urgent care or ER visits.     Documentation of patient's symptoms with migraine are included in the initial note with this patient including the following:  Education/counseling/reduction in medications if pt has medication overuse headache;  Frequency of headaches is >15 days monthly with at least 8 migraines monthly;  Migraines include at least two of the following: worsened with activity or avoidance of activity with migraines (ie they go lie down), moderate to severe pain intensity, pulsing headache, unilateral headache AND they have either nausea or vomiting OR sensitivity to light and sound     Although this patient is responding to botox they are NOT migraine free, however, and it is my recommendation botox be continued at this time     I treated this patient in clinic today with BotoxA 155 units according to the dosing/injection paradigm currently mandated by the FDA for the management of chronic migraine. Specifically, I injected 5 units to the procerus, 5 units to the corrugators bilaterally, a total of 20 units to the frontalis musculature, 20 units to the temporalis bilaterally, 15 units to the occipitalis bilaterally, 10 units to the cervical paraspinals bilaterally and 15 units to the trapezius musculature bilaterally. The remainder of the Botox 200 units mixed but not administered was discarded as wastage per FDA guidelines.      Toradol 60mg IM provided per MA under direct physician supervision.    Updated Rx for amitriptyline " with dose increase from 10mg qhs to 10mg qam and 20mg qhs in the hopes of treating right leg post-op neuropathy and sinus surgery pain.    Return for follow-up in 12 weeks for 4th set of Botox

## 2020-08-13 DIAGNOSIS — Z01.812 PRE-PROCEDURAL LABORATORY EXAMINATION: ICD-10-CM

## 2020-08-13 LAB
ANION GAP SERPL CALC-SCNC: 16 MMOL/L (ref 7–16)
BUN SERPL-MCNC: 19 MG/DL (ref 8–22)
CALCIUM SERPL-MCNC: 9.4 MG/DL (ref 8.5–10.5)
CHLORIDE SERPL-SCNC: 98 MMOL/L (ref 96–112)
CO2 SERPL-SCNC: 22 MMOL/L (ref 20–33)
COVID ORDER STATUS COVID19: NORMAL
CREAT SERPL-MCNC: 1.18 MG/DL (ref 0.5–1.4)
ERYTHROCYTE [DISTWIDTH] IN BLOOD BY AUTOMATED COUNT: 54.1 FL (ref 35.9–50)
GLUCOSE SERPL-MCNC: 117 MG/DL (ref 65–99)
HCT VFR BLD AUTO: 41.9 % (ref 37–47)
HGB BLD-MCNC: 13.3 G/DL (ref 12–16)
MCH RBC QN AUTO: 29.1 PG (ref 27–33)
MCHC RBC AUTO-ENTMCNC: 31.7 G/DL (ref 33.6–35)
MCV RBC AUTO: 91.7 FL (ref 81.4–97.8)
PLATELET # BLD AUTO: 316 K/UL (ref 164–446)
PMV BLD AUTO: 10.4 FL (ref 9–12.9)
POTASSIUM SERPL-SCNC: 4.5 MMOL/L (ref 3.6–5.5)
RBC # BLD AUTO: 4.57 M/UL (ref 4.2–5.4)
SARS-COV-2 RNA RESP QL NAA+PROBE: NOTDETECTED
SODIUM SERPL-SCNC: 136 MMOL/L (ref 135–145)
SPECIMEN SOURCE: NORMAL
WBC # BLD AUTO: 8.3 K/UL (ref 4.8–10.8)

## 2020-08-13 PROCEDURE — U0003 INFECTIOUS AGENT DETECTION BY NUCLEIC ACID (DNA OR RNA); SEVERE ACUTE RESPIRATORY SYNDROME CORONAVIRUS 2 (SARS-COV-2) (CORONAVIRUS DISEASE [COVID-19]), AMPLIFIED PROBE TECHNIQUE, MAKING USE OF HIGH THROUGHPUT TECHNOLOGIES AS DESCRIBED BY CMS-2020-01-R: HCPCS

## 2020-08-13 PROCEDURE — 85027 COMPLETE CBC AUTOMATED: CPT

## 2020-08-13 PROCEDURE — 80048 BASIC METABOLIC PNL TOTAL CA: CPT

## 2020-08-13 PROCEDURE — 36415 COLL VENOUS BLD VENIPUNCTURE: CPT

## 2020-08-13 ASSESSMENT — FIBROSIS 4 INDEX: FIB4 SCORE: 1.17

## 2020-08-19 ENCOUNTER — HOSPITAL ENCOUNTER (OUTPATIENT)
Facility: MEDICAL CENTER | Age: 55
End: 2020-08-19
Attending: INTERNAL MEDICINE | Admitting: INTERNAL MEDICINE
Payer: MEDICARE

## 2020-08-19 VITALS
BODY MASS INDEX: 35.27 KG/M2 | WEIGHT: 206.57 LBS | SYSTOLIC BLOOD PRESSURE: 142 MMHG | OXYGEN SATURATION: 96 % | HEIGHT: 64 IN | DIASTOLIC BLOOD PRESSURE: 98 MMHG | TEMPERATURE: 97.3 F | RESPIRATION RATE: 19 BRPM | HEART RATE: 99 BPM

## 2020-08-19 PROCEDURE — 91010 ESOPHAGUS MOTILITY STUDY: CPT | Performed by: INTERNAL MEDICINE

## 2020-08-19 PROCEDURE — 700101 HCHG RX REV CODE 250

## 2020-08-19 PROCEDURE — 160048 HCHG OR STATISTICAL LEVEL 1-5: Performed by: INTERNAL MEDICINE

## 2020-08-19 PROCEDURE — 160035 HCHG PACU - 1ST 60 MINS PHASE I: Performed by: INTERNAL MEDICINE

## 2020-08-19 RX ORDER — LIDOCAINE HYDROCHLORIDE 20 MG/ML
JELLY TOPICAL
Status: DISCONTINUED
Start: 2020-08-19 | End: 2020-08-19 | Stop reason: HOSPADM

## 2020-08-19 RX ORDER — LIDOCAINE HYDROCHLORIDE 20 MG/ML
SOLUTION OROPHARYNGEAL
Status: DISCONTINUED
Start: 2020-08-19 | End: 2020-08-19 | Stop reason: HOSPADM

## 2020-08-19 ASSESSMENT — FIBROSIS 4 INDEX: FIB4 SCORE: 1.07

## 2020-08-19 NOTE — DISCHARGE INSTRUCTIONS
Esophageal Motility Study - Discharge Instructions    Esophageal Motility Study  1.  Resume regular diet and medications.  2.  Follow up with your doctor.  3.  Additional instructions:     You should call 911 if you develop problems with breathing or chest pain.  If any questions arise, call your doctor. If your doctor is not available, please feel free to call [phone]. You can also call the [employer] HOTLINE open 24 hours/day, 7 days/week and speak to a nurse at [phone], or toll free [phone].    I acknowledge receipt and understanding of these Home Care Instructions.

## 2020-09-02 ENCOUNTER — TELEMEDICINE (OUTPATIENT)
Dept: BEHAVIORAL HEALTH | Facility: CLINIC | Age: 55
End: 2020-09-02
Payer: MEDICARE

## 2020-09-02 DIAGNOSIS — F43.10 POST TRAUMATIC STRESS DISORDER: ICD-10-CM

## 2020-09-02 PROCEDURE — 90791 PSYCH DIAGNOSTIC EVALUATION: CPT | Performed by: SOCIAL WORKER

## 2020-09-02 SDOH — HEALTH STABILITY: MENTAL HEALTH: HOW MANY STANDARD DRINKS CONTAINING ALCOHOL DO YOU HAVE ON A TYPICAL DAY?: 1 OR 2

## 2020-09-02 SDOH — HEALTH STABILITY: MENTAL HEALTH: HOW OFTEN DO YOU HAVE A DRINK CONTAINING ALCOHOL?: 2-3 TIMES A WEEK

## 2020-09-02 SDOH — HEALTH STABILITY: MENTAL HEALTH: HOW OFTEN DO YOU HAVE 6 OR MORE DRINKS ON ONE OCCASION?: NEVER
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
## 2020-09-11 ENCOUNTER — HOSPITAL ENCOUNTER (OUTPATIENT)
Dept: LAB | Facility: MEDICAL CENTER | Age: 55
End: 2020-09-11
Attending: INTERNAL MEDICINE
Payer: MEDICARE

## 2020-09-11 LAB
ALBUMIN SERPL BCP-MCNC: 4.4 G/DL (ref 3.2–4.9)
ALBUMIN/GLOB SERPL: 1.6 G/DL
ALP SERPL-CCNC: 113 U/L (ref 30–99)
ALT SERPL-CCNC: 17 U/L (ref 2–50)
ANION GAP SERPL CALC-SCNC: 15 MMOL/L (ref 7–16)
AST SERPL-CCNC: 19 U/L (ref 12–45)
BILIRUB SERPL-MCNC: 0.3 MG/DL (ref 0.1–1.5)
BUN SERPL-MCNC: 12 MG/DL (ref 8–22)
CALCIUM SERPL-MCNC: 9.5 MG/DL (ref 8.5–10.5)
CHLORIDE SERPL-SCNC: 100 MMOL/L (ref 96–112)
CO2 SERPL-SCNC: 24 MMOL/L (ref 20–33)
CREAT SERPL-MCNC: 1 MG/DL (ref 0.5–1.4)
CREAT UR-MCNC: 37.96 MG/DL
FASTING STATUS PATIENT QL REPORTED: NORMAL
GLOBULIN SER CALC-MCNC: 2.8 G/DL (ref 1.9–3.5)
GLUCOSE SERPL-MCNC: 114 MG/DL (ref 65–99)
PHOSPHATE SERPL-MCNC: 4 MG/DL (ref 2.5–4.5)
POTASSIUM SERPL-SCNC: 3.8 MMOL/L (ref 3.6–5.5)
POTASSIUM UR-SCNC: 25 MMOL/L
PROT SERPL-MCNC: 7.2 G/DL (ref 6–8.2)
SODIUM SERPL-SCNC: 139 MMOL/L (ref 135–145)
SODIUM UR-SCNC: 135 MMOL/L

## 2020-09-11 PROCEDURE — 84133 ASSAY OF URINE POTASSIUM: CPT

## 2020-09-11 PROCEDURE — 82570 ASSAY OF URINE CREATININE: CPT

## 2020-09-11 PROCEDURE — 82340 ASSAY OF CALCIUM IN URINE: CPT

## 2020-09-11 PROCEDURE — 80053 COMPREHEN METABOLIC PANEL: CPT

## 2020-09-11 PROCEDURE — 36415 COLL VENOUS BLD VENIPUNCTURE: CPT

## 2020-09-11 PROCEDURE — 84100 ASSAY OF PHOSPHORUS: CPT

## 2020-09-11 PROCEDURE — 84105 ASSAY OF URINE PHOSPHORUS: CPT

## 2020-09-11 PROCEDURE — 84305 ASSAY OF SOMATOMEDIN: CPT

## 2020-09-11 PROCEDURE — 84300 ASSAY OF URINE SODIUM: CPT

## 2020-09-15 LAB
CALCIUM 24H UR-MCNC: 6.5 MG/DL
CALCIUM 24H UR-MRATE: NORMAL MG/D
CALCIUM/CREAT 24H UR: 197 MG/G (ref 20–300)
COLLECT DURATION TIME SPEC: NORMAL HRS
COLLECT DURATION TIME SPEC: NORMAL HRS
CREAT 24H UR-MCNC: 33 MG/DL
CREAT 24H UR-MRATE: NORMAL MG/D (ref 500–1400)
IGF-I SERPL-MCNC: 126 NG/ML (ref 48–235)
IGF-I Z-SCORE SERPL: 0.2
PHOSPHATE 24H UR-MCNC: 23 MG/DL
PHOSPHATE 24H UR-MRATE: NORMAL MG/D (ref 400–1300)
PHOSPHATE/CREAT 24H UR: 697 MG/G
SPECIMEN VOL ?TM UR: NORMAL ML
TOTAL VOLUME 1105: NORMAL ML

## 2020-09-18 DIAGNOSIS — G43.709 CHRONIC MIGRAINE W/O AURA W/O STATUS MIGRAINOSUS, NOT INTRACTABLE: ICD-10-CM

## 2020-09-28 ENCOUNTER — APPOINTMENT (OUTPATIENT)
Dept: RADIOLOGY | Facility: MEDICAL CENTER | Age: 55
End: 2020-09-28
Attending: EMERGENCY MEDICINE
Payer: MEDICARE

## 2020-09-28 ENCOUNTER — HOSPITAL ENCOUNTER (EMERGENCY)
Facility: MEDICAL CENTER | Age: 55
End: 2020-09-28
Attending: EMERGENCY MEDICINE | Admitting: EMERGENCY MEDICINE
Payer: MEDICARE

## 2020-09-28 VITALS
WEIGHT: 206.13 LBS | BODY MASS INDEX: 35.19 KG/M2 | HEART RATE: 95 BPM | RESPIRATION RATE: 18 BRPM | SYSTOLIC BLOOD PRESSURE: 161 MMHG | DIASTOLIC BLOOD PRESSURE: 98 MMHG | TEMPERATURE: 97 F | OXYGEN SATURATION: 97 % | HEIGHT: 64 IN

## 2020-09-28 DIAGNOSIS — R13.19 ESOPHAGEAL DYSPHAGIA: ICD-10-CM

## 2020-09-28 LAB
ALBUMIN SERPL BCP-MCNC: 4.1 G/DL (ref 3.2–4.9)
ALBUMIN/GLOB SERPL: 1.1 G/DL
ALP SERPL-CCNC: 113 U/L (ref 30–99)
ALT SERPL-CCNC: 16 U/L (ref 2–50)
ANION GAP SERPL CALC-SCNC: 16 MMOL/L (ref 7–16)
AST SERPL-CCNC: 19 U/L (ref 12–45)
BASOPHILS # BLD AUTO: 0.1 % (ref 0–1.8)
BASOPHILS # BLD: 0.01 K/UL (ref 0–0.12)
BILIRUB SERPL-MCNC: 0.3 MG/DL (ref 0.1–1.5)
BUN SERPL-MCNC: 8 MG/DL (ref 8–22)
CALCIUM SERPL-MCNC: 9.2 MG/DL (ref 8.4–10.2)
CHLORIDE SERPL-SCNC: 98 MMOL/L (ref 96–112)
CO2 SERPL-SCNC: 25 MMOL/L (ref 20–33)
CREAT SERPL-MCNC: 0.99 MG/DL (ref 0.5–1.4)
EKG IMPRESSION: NORMAL
EOSINOPHIL # BLD AUTO: 0.09 K/UL (ref 0–0.51)
EOSINOPHIL NFR BLD: 1.3 % (ref 0–6.9)
ERYTHROCYTE [DISTWIDTH] IN BLOOD BY AUTOMATED COUNT: 49.1 FL (ref 35.9–50)
GLOBULIN SER CALC-MCNC: 3.6 G/DL (ref 1.9–3.5)
GLUCOSE SERPL-MCNC: 111 MG/DL (ref 65–99)
HCT VFR BLD AUTO: 45.2 % (ref 37–47)
HGB BLD-MCNC: 13.8 G/DL (ref 12–16)
IMM GRANULOCYTES # BLD AUTO: 0.02 K/UL (ref 0–0.11)
IMM GRANULOCYTES NFR BLD AUTO: 0.3 % (ref 0–0.9)
LIPASE SERPL-CCNC: 27 U/L (ref 7–58)
LYMPHOCYTES # BLD AUTO: 1.68 K/UL (ref 1–4.8)
LYMPHOCYTES NFR BLD: 25 % (ref 22–41)
MCH RBC QN AUTO: 26.7 PG (ref 27–33)
MCHC RBC AUTO-ENTMCNC: 30.5 G/DL (ref 33.6–35)
MCV RBC AUTO: 87.6 FL (ref 81.4–97.8)
MONOCYTES # BLD AUTO: 0.62 K/UL (ref 0–0.85)
MONOCYTES NFR BLD AUTO: 9.2 % (ref 0–13.4)
NEUTROPHILS # BLD AUTO: 4.29 K/UL (ref 2–7.15)
NEUTROPHILS NFR BLD: 64.1 % (ref 44–72)
NRBC # BLD AUTO: 0 K/UL
NRBC BLD-RTO: 0 /100 WBC
PLATELET # BLD AUTO: 232 K/UL (ref 164–446)
PMV BLD AUTO: 11.8 FL (ref 9–12.9)
POTASSIUM SERPL-SCNC: 3.9 MMOL/L (ref 3.6–5.5)
PROT SERPL-MCNC: 7.7 G/DL (ref 6–8.2)
RBC # BLD AUTO: 5.16 M/UL (ref 4.2–5.4)
SODIUM SERPL-SCNC: 139 MMOL/L (ref 135–145)
TROPONIN T SERPL-MCNC: 6 NG/L (ref 6–19)
WBC # BLD AUTO: 6.7 K/UL (ref 4.8–10.8)

## 2020-09-28 PROCEDURE — 80053 COMPREHEN METABOLIC PANEL: CPT

## 2020-09-28 PROCEDURE — 700102 HCHG RX REV CODE 250 W/ 637 OVERRIDE(OP): Performed by: EMERGENCY MEDICINE

## 2020-09-28 PROCEDURE — 93005 ELECTROCARDIOGRAM TRACING: CPT | Performed by: EMERGENCY MEDICINE

## 2020-09-28 PROCEDURE — 83690 ASSAY OF LIPASE: CPT

## 2020-09-28 PROCEDURE — A9270 NON-COVERED ITEM OR SERVICE: HCPCS | Performed by: EMERGENCY MEDICINE

## 2020-09-28 PROCEDURE — 700105 HCHG RX REV CODE 258: Performed by: EMERGENCY MEDICINE

## 2020-09-28 PROCEDURE — 99284 EMERGENCY DEPT VISIT MOD MDM: CPT

## 2020-09-28 PROCEDURE — 74220 X-RAY XM ESOPHAGUS 1CNTRST: CPT

## 2020-09-28 PROCEDURE — 85025 COMPLETE CBC W/AUTO DIFF WBC: CPT

## 2020-09-28 PROCEDURE — 84484 ASSAY OF TROPONIN QUANT: CPT

## 2020-09-28 RX ORDER — AMITRIPTYLINE HYDROCHLORIDE 10 MG/1
10-20 TABLET, FILM COATED ORAL 2 TIMES DAILY
Status: SHIPPED | COMMUNITY
End: 2020-11-02

## 2020-09-28 RX ORDER — SODIUM CHLORIDE 9 MG/ML
1000 INJECTION, SOLUTION INTRAVENOUS ONCE
Status: COMPLETED | OUTPATIENT
Start: 2020-09-28 | End: 2020-09-28

## 2020-09-28 RX ORDER — EMPAGLIFLOZIN 10 MG/1
10 TABLET, FILM COATED ORAL
Status: SHIPPED | COMMUNITY
Start: 2020-07-13 | End: 2021-06-22

## 2020-09-28 RX ORDER — FLUTICASONE PROPIONATE 50 MCG
1 SPRAY, SUSPENSION (ML) NASAL 2 TIMES DAILY
Status: SHIPPED | COMMUNITY
Start: 2020-09-08 | End: 2021-03-16

## 2020-09-28 RX ORDER — GUAIFENESIN 400 MG/1
400 TABLET ORAL EVERY 8 HOURS PRN
Status: SHIPPED | COMMUNITY
Start: 2020-09-08 | End: 2021-03-16

## 2020-09-28 RX ADMIN — SODIUM CHLORIDE 1000 ML: 9 INJECTION, SOLUTION INTRAVENOUS at 15:23

## 2020-09-28 RX ADMIN — LIDOCAINE HYDROCHLORIDE 30 ML: 20 SOLUTION OROPHARYNGEAL at 16:44

## 2020-09-28 ASSESSMENT — FIBROSIS 4 INDEX: FIB4 SCORE: 0.82

## 2020-09-28 NOTE — ED NOTES
"Pt presents with a hx of decreased esophageal motility, and has had endoscopies in the past.  She returns today complaining of dysphagia worsening since this past Friday.  Chief Complaint   Patient presents with   • Difficulty Swallowing       BP (!) 162/103   Pulse 91   Temp 36.1 °C (97 °F) (Temporal)   Resp 16   Ht 1.626 m (5' 4\")   Wt 93.5 kg (206 lb 2.1 oz)   LMP 02/07/2016 (Within Months)   SpO2 95%   BMI 35.38 kg/m²      "
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
## 2020-09-29 NOTE — ED NOTES
Patient verbalized understanding to plan of care and discharge information. Patient in stable condition. No signs of distress. Patient pain free. Patient ambulatory out of ED to personal vehicle with stable gait by self.   details… detailed exam

## 2020-09-29 NOTE — DISCHARGE INSTRUCTIONS
Follow-up with your surgeon tomorrow    Return to the ER for worsening symptoms    Continue with your medications as directed

## 2020-10-05 ENCOUNTER — OFFICE VISIT (OUTPATIENT)
Dept: VASCULAR LAB | Facility: MEDICAL CENTER | Age: 55
End: 2020-10-05
Attending: INTERNAL MEDICINE
Payer: MEDICARE

## 2020-10-05 VITALS
DIASTOLIC BLOOD PRESSURE: 104 MMHG | SYSTOLIC BLOOD PRESSURE: 154 MMHG | BODY MASS INDEX: 35.51 KG/M2 | HEART RATE: 89 BPM | WEIGHT: 208 LBS | HEIGHT: 64 IN

## 2020-10-05 DIAGNOSIS — I51.81 TAKOTSUBO SYNDROME: ICD-10-CM

## 2020-10-05 DIAGNOSIS — I10 ESSENTIAL HYPERTENSION: ICD-10-CM

## 2020-10-05 DIAGNOSIS — E03.9 HYPOTHYROIDISM, UNSPECIFIED TYPE: ICD-10-CM

## 2020-10-05 PROCEDURE — 99214 OFFICE O/P EST MOD 30 MIN: CPT | Performed by: INTERNAL MEDICINE

## 2020-10-05 PROCEDURE — 99212 OFFICE O/P EST SF 10 MIN: CPT

## 2020-10-05 RX ORDER — DOXAZOSIN 2 MG/1
2 TABLET ORAL DAILY
Qty: 90 TAB | Refills: 3 | Status: ON HOLD | OUTPATIENT
Start: 2020-10-05 | End: 2021-05-15

## 2020-10-05 RX ORDER — FUROSEMIDE 20 MG/1
20 TABLET ORAL DAILY
Qty: 30 TAB | Refills: 11 | Status: ON HOLD
Start: 2020-10-05 | End: 2021-04-28

## 2020-10-05 RX ORDER — SPIRONOLACTONE 25 MG/1
25 TABLET ORAL DAILY
Qty: 30 TAB | Refills: 3
Start: 2020-10-05 | End: 2020-11-30

## 2020-10-05 ASSESSMENT — ENCOUNTER SYMPTOMS
SEIZURES: 0
HEADACHES: 1
PALPITATIONS: 0
WEIGHT LOSS: 0
NERVOUS/ANXIOUS: 0
FOCAL WEAKNESS: 0
DIZZINESS: 0
BACK PAIN: 1
SPEECH CHANGE: 0
SHORTNESS OF BREATH: 0
SENSORY CHANGE: 0
DEPRESSION: 0
COUGH: 0

## 2020-10-05 ASSESSMENT — FIBROSIS 4 INDEX: FIB4 SCORE: 1.15

## 2020-10-05 NOTE — PROGRESS NOTES
"  Follow Up VASCULAR VISIT  Subjective:   Donna Isaac is a 54 y.o. female who presents today -20 for   Chief Complaint   Patient presents with   • Follow-Up     HPI:  Here for f/u of htn in settting of takasubo's cm, thyroid disease, and dm.  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   She has an appointment to see a counselor for ptsd    Social History     Tobacco Use   Smoking Status Never Smoker   Smokeless Tobacco Never Used     DIET AND EXERCISE:  Weight Change: down a few pounds  Diet: common adult  Exercise: minimal exercise     Review of Systems   Constitutional: Positive for malaise/fatigue. Negative for weight loss.   Respiratory: Negative for cough and shortness of breath.    Cardiovascular: Negative for chest pain, palpitations and leg swelling.   Musculoskeletal: Positive for back pain and joint pain.   Neurological: Positive for headaches. Negative for dizziness, sensory change, speech change, focal weakness and seizures.   Psychiatric/Behavioral: Negative for depression. The patient is not nervous/anxious.       Objective:     Vitals:    10/05/20 1550 10/05/20 1554   BP: (!) 178/103 154/104   BP Location: Left arm Left arm   Patient Position: Sitting Sitting   BP Cuff Size: Adult Adult   Pulse: 89 89   Weight: 94.3 kg (208 lb)    Height: 1.62 m (5' 3.78\")       Body mass index is 35.95 kg/m².  Physical Exam  Vitals signs reviewed.   Constitutional:       General: She " is not in acute distress.     Appearance: She is well-developed. She is not diaphoretic.   Neck:      Musculoskeletal: Neck supple.      Thyroid: No thyromegaly.      Vascular: No carotid bruit or JVD.   Cardiovascular:      Rate and Rhythm: Normal rate and regular rhythm.      Heart sounds: No murmur.   Pulmonary:      Effort: Pulmonary effort is normal. No respiratory distress.      Breath sounds: No rales.   Musculoskeletal:         General: No swelling or tenderness.      Comments: Right foot in boot   Neurological:      General: No focal deficit present.      Mental Status: She is alert and oriented to person, place, and time.      Cranial Nerves: No cranial nerve deficit.      Coordination: Coordination normal.      Deep Tendon Reflexes: Reflexes are normal and symmetric.   Psychiatric:         Mood and Affect: Mood normal.         Behavior: Behavior normal.       Lab Results   Component Value Date    CHOLSTRLTOT 193 08/26/2019    LDL 65 08/26/2019    HDL 92 08/26/2019    TRIGLYCERIDE 179 (H) 08/26/2019      Lab Results   Component Value Date    PROTHROMBTM 12.3 12/10/2019    INR 0.91 12/10/2019       Lab Results   Component Value Date    HBA1C 6.0 (H) 06/19/2020      Lab Results   Component Value Date    SODIUM 139 09/28/2020    POTASSIUM 3.9 09/28/2020    CHLORIDE 98 09/28/2020    CO2 25 09/28/2020    GLUCOSE 111 (H) 09/28/2020    BUN 8 09/28/2020    CREATININE 0.99 09/28/2020    BUNCREATRAT 11 10/12/2018    IFAFRICA >60 09/28/2020    IFNOTAFR 58 (A) 09/28/2020      Lab Results   Component Value Date    ALDOSTERONE 6.4 06/19/2020    PRA - 5.5  Plasma mets - normal    Lab Results   Component Value Date    WBC 6.7 09/28/2020    RBC 5.16 09/28/2020    HEMOGLOBIN 13.8 09/28/2020    HEMATOCRIT 45.2 09/28/2020    MCV 87.6 09/28/2020    MCH 26.7 (L) 09/28/2020    MCHC 30.5 (L) 09/28/2020    MPV 11.8 09/28/2020      Echo march 2020  Normal left ventricular systolic function. Left ventricular ejection   fraction is  visually estimated to be 70%.  Normal diastolic function.  Ascending aorta is dilated with a diameter of 4.3  cm.    ABPM March 2020  Ambulatory blood pressure monitor results reviewed  Full report under media tab  Reasonable data acquisition  Mean daytime: 164/101 consistent with poorly controlled mixed systolic and diastolic out of office blood pressure  Nocturnal dip: Appears to be present based upon the curves but not on the averages  Did have a precipitous fall in blood pressure during early morning hours without increase in heart rate    Medical Decision Making:  Today's Assessment / Status / Plan:     1. Essential hypertension  spironolactone (ALDACTONE) 25 MG Tab    doxazosin (CARDURA) 2 MG Tab    furosemide (LASIX) 20 MG Tab   2. Takotsubo syndrome     3. Hypothyroidism, unspecified type       Patient Type: Primary Prevention    Etiology of Established CVD if Present: Recurrent stress-induced cardiomyopathy    Lipid Management: Qualifies for Statin Therapy Based on 2013 ACC/AHA Guidelines: no  Calculated 10-Year Risk of ASCVD: 2  Currently on Statin: No  Clean coronaries on catheterization by her report in the past  No clear indication for statin therapy  -Continue lifestyle modification    Blood Pressure Management:  ACC-AHA blood pressure goal less than 130/80  Reasonably long history of labile blood pressure  Appears higher at home in the office -perhaps due to inadvertently stopping doxazosin  Given her history of stress-induced cardiomyopathy, I think this likely represents 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 cortisol is normal  Aldosterone renin ratio not consistent with primary aldosteronism  No albuminuria  Plan:  -Continue home blood  pressure monitoring  -Restart doxazosin 2 mg each night  -Continue carvedilol  -Continue spironolactone  - continue losartan  - continue furosemide for now - patient thinks it helps her swelling - but consider change to hctz in future  -hold renal artery duplex for now  -Defer decisions about how to potentially manage her steroids to endo    Glycemic Status: Prediabetes  - continue jardiance per endo    Anti-Platelet/Anti-Coagulant Tx: Not currently indicated    Smoking: Continue complete avoidance    Physical Activity: Defer recommendations to her orthopedic surgeon    Weight Management and Nutrition: Dietary plan was discussed with patient at this visit including Mediterranean-style diet low in sodium    Other:     1) stress-induced cardiomyopathy, recurrent -most recent ejection fraction is normal.  No signs or symptoms of heart failure on exam currently.  She has had a normal Holter monitor and cardiac cath by report  -Continue current neurohormonal therapy  -Defer any other further work-up and management to cardiology    2) hypothyroidism -defer management to endocrinology    3) sinus cyst and migraine headache -improved since surgery.  Defer management to ENT and neurology    4) multiple orthopedic issues -defer management to orthopedics    5) fibromyalgia -defer management to neurology and PCP    6) PTSD - courtesy referral to psychologist at patient request    Instructed to follow-up with PCP for remainder of adult medical needs: yes  We will partner with other providers in the management of established vascular disease and cardiometabolic risk factors.    Studies to Be Obtained: none  Labs to Be Obtained: None currently    Follow up in: 2 months    Michael J Bloch, M.D.     Cc:  DOUGIE Butler

## 2020-10-06 ENCOUNTER — HOSPITAL ENCOUNTER (OUTPATIENT)
Dept: LAB | Facility: MEDICAL CENTER | Age: 55
End: 2020-10-06
Attending: INTERNAL MEDICINE
Payer: MEDICARE

## 2020-10-06 LAB
ALBUMIN SERPL BCP-MCNC: 4.1 G/DL (ref 3.2–4.9)
ALBUMIN/GLOB SERPL: 1.4 G/DL
ALP SERPL-CCNC: 127 U/L (ref 30–99)
ALT SERPL-CCNC: 634 U/L (ref 2–50)
ANION GAP SERPL CALC-SCNC: 16 MMOL/L (ref 7–16)
AST SERPL-CCNC: 1169 U/L (ref 12–45)
BILIRUB SERPL-MCNC: 0.4 MG/DL (ref 0.1–1.5)
BUN SERPL-MCNC: 25 MG/DL (ref 8–22)
CALCIUM SERPL-MCNC: 9.3 MG/DL (ref 8.5–10.5)
CHLORIDE SERPL-SCNC: 96 MMOL/L (ref 96–112)
CO2 SERPL-SCNC: 19 MMOL/L (ref 20–33)
CORTIS SERPL-MCNC: 2.5 UG/DL (ref 0–23)
CREAT SERPL-MCNC: 1.2 MG/DL (ref 0.5–1.4)
CREAT UR-MCNC: 51.4 MG/DL
GLOBULIN SER CALC-MCNC: 2.9 G/DL (ref 1.9–3.5)
GLUCOSE SERPL-MCNC: 109 MG/DL (ref 65–99)
PHOSPHATE SERPL-MCNC: 4.5 MG/DL (ref 2.5–4.5)
POTASSIUM SERPL-SCNC: 4.9 MMOL/L (ref 3.6–5.5)
POTASSIUM UR-SCNC: 26 MMOL/L
PROT SERPL-MCNC: 7 G/DL (ref 6–8.2)
SODIUM SERPL-SCNC: 131 MMOL/L (ref 135–145)
SODIUM UR-SCNC: 121 MMOL/L

## 2020-10-06 PROCEDURE — 82570 ASSAY OF URINE CREATININE: CPT

## 2020-10-06 PROCEDURE — 84100 ASSAY OF PHOSPHORUS: CPT

## 2020-10-06 PROCEDURE — 84105 ASSAY OF URINE PHOSPHORUS: CPT

## 2020-10-06 PROCEDURE — 82340 ASSAY OF CALCIUM IN URINE: CPT

## 2020-10-06 PROCEDURE — 84300 ASSAY OF URINE SODIUM: CPT

## 2020-10-06 PROCEDURE — 36415 COLL VENOUS BLD VENIPUNCTURE: CPT

## 2020-10-06 PROCEDURE — 84133 ASSAY OF URINE POTASSIUM: CPT

## 2020-10-06 PROCEDURE — 82088 ASSAY OF ALDOSTERONE: CPT

## 2020-10-06 PROCEDURE — 82533 TOTAL CORTISOL: CPT

## 2020-10-06 PROCEDURE — 80053 COMPREHEN METABOLIC PANEL: CPT

## 2020-10-10 LAB
ALDOST SERPL-MCNC: 64.2 NG/DL
CALCIUM 24H UR-MCNC: 2.5 MG/DL
CALCIUM 24H UR-MRATE: NORMAL MG/D
CALCIUM/CREAT 24H UR: 51 MG/G (ref 20–300)
COLLECT DURATION TIME SPEC: NORMAL HRS
COLLECT DURATION TIME SPEC: NORMAL HRS
CREAT 24H UR-MCNC: 49 MG/DL
CREAT 24H UR-MRATE: NORMAL MG/D (ref 500–1400)
PHOSPHATE 24H UR-MCNC: 50 MG/DL
PHOSPHATE 24H UR-MRATE: NORMAL MG/D (ref 400–1300)
PHOSPHATE/CREAT 24H UR: 1020 MG/G
SPECIMEN VOL ?TM UR: NORMAL ML
TOTAL VOLUME 1105: NORMAL ML

## 2020-10-22 ENCOUNTER — OFFICE VISIT (OUTPATIENT)
Dept: CARDIOLOGY | Facility: MEDICAL CENTER | Age: 55
End: 2020-10-22
Payer: MEDICARE

## 2020-10-22 VITALS
BODY MASS INDEX: 35 KG/M2 | DIASTOLIC BLOOD PRESSURE: 112 MMHG | HEART RATE: 96 BPM | WEIGHT: 205 LBS | OXYGEN SATURATION: 96 % | SYSTOLIC BLOOD PRESSURE: 160 MMHG | HEIGHT: 64 IN

## 2020-10-22 DIAGNOSIS — I42.8 OTHER CARDIOMYOPATHY (HCC): ICD-10-CM

## 2020-10-22 DIAGNOSIS — R09.89 LABILE HYPERTENSION: ICD-10-CM

## 2020-10-22 DIAGNOSIS — I71.21 ASCENDING AORTIC ANEURYSM (HCC): ICD-10-CM

## 2020-10-22 PROCEDURE — 99214 OFFICE O/P EST MOD 30 MIN: CPT | Performed by: INTERNAL MEDICINE

## 2020-10-22 ASSESSMENT — FIBROSIS 4 INDEX: FIB4 SCORE: 11.21

## 2020-10-22 ASSESSMENT — ENCOUNTER SYMPTOMS
LOSS OF CONSCIOUSNESS: 0
CARDIOVASCULAR NEGATIVE: 1
SHORTNESS OF BREATH: 0
DIZZINESS: 0
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
## 2020-10-26 ENCOUNTER — TELEPHONE (OUTPATIENT)
Dept: CARDIOLOGY | Facility: MEDICAL CENTER | Age: 55
End: 2020-10-26

## 2020-10-26 NOTE — TELEPHONE ENCOUNTER
----- Message from Adriana Carlson M.D. sent at 10/22/2020 12:51 PM PDT -----  Please send clearance to Dr. Camila Mills at [facility]  Moderate risk

## 2020-10-26 NOTE — TELEPHONE ENCOUNTER
Letter printed with MD recommendations and faxed to [facility], [phone], completed status.

## 2020-10-27 ENCOUNTER — HOSPITAL ENCOUNTER (OUTPATIENT)
Dept: LAB | Facility: MEDICAL CENTER | Age: 55
End: 2020-10-27
Attending: INTERNAL MEDICINE
Payer: MEDICARE

## 2020-10-27 ENCOUNTER — OFFICE VISIT (OUTPATIENT)
Dept: NEUROLOGY | Facility: MEDICAL CENTER | Age: 55
End: 2020-10-27
Payer: MEDICARE

## 2020-10-27 VITALS
SYSTOLIC BLOOD PRESSURE: 128 MMHG | OXYGEN SATURATION: 95 % | BODY MASS INDEX: 36.03 KG/M2 | DIASTOLIC BLOOD PRESSURE: 74 MMHG | RESPIRATION RATE: 15 BRPM | TEMPERATURE: 98.2 F | HEART RATE: 74 BPM | WEIGHT: 209.88 LBS

## 2020-10-27 LAB
ALBUMIN SERPL BCP-MCNC: 3.6 G/DL (ref 3.2–4.9)
ALBUMIN/GLOB SERPL: 1.4 G/DL
ALP SERPL-CCNC: 111 U/L (ref 30–99)
ALT SERPL-CCNC: 22 U/L (ref 2–50)
ANION GAP SERPL CALC-SCNC: 13 MMOL/L (ref 7–16)
AST SERPL-CCNC: 20 U/L (ref 12–45)
BILIRUB SERPL-MCNC: 0.2 MG/DL (ref 0.1–1.5)
BUN SERPL-MCNC: 10 MG/DL (ref 8–22)
CALCIUM SERPL-MCNC: 8.8 MG/DL (ref 8.5–10.5)
CHLORIDE SERPL-SCNC: 102 MMOL/L (ref 96–112)
CO2 SERPL-SCNC: 22 MMOL/L (ref 20–33)
CORTIS SERPL-MCNC: 1.4 UG/DL (ref 0–23)
CREAT SERPL-MCNC: 0.94 MG/DL (ref 0.5–1.4)
CREAT UR-MCNC: 58.24 MG/DL
FASTING STATUS PATIENT QL REPORTED: NORMAL
GLOBULIN SER CALC-MCNC: 2.6 G/DL (ref 1.9–3.5)
GLUCOSE SERPL-MCNC: 86 MG/DL (ref 65–99)
PHOSPHATE SERPL-MCNC: 3.3 MG/DL (ref 2.5–4.5)
POTASSIUM SERPL-SCNC: 3.9 MMOL/L (ref 3.6–5.5)
POTASSIUM UR-SCNC: 22 MMOL/L
PROT SERPL-MCNC: 6.2 G/DL (ref 6–8.2)
SODIUM SERPL-SCNC: 137 MMOL/L (ref 135–145)
SODIUM UR-SCNC: 102 MMOL/L

## 2020-10-27 PROCEDURE — 84105 ASSAY OF URINE PHOSPHORUS: CPT

## 2020-10-27 PROCEDURE — 64615 CHEMODENERV MUSC MIGRAINE: CPT | Performed by: NURSE PRACTITIONER

## 2020-10-27 PROCEDURE — 36415 COLL VENOUS BLD VENIPUNCTURE: CPT

## 2020-10-27 PROCEDURE — 84133 ASSAY OF URINE POTASSIUM: CPT

## 2020-10-27 PROCEDURE — 82340 ASSAY OF CALCIUM IN URINE: CPT

## 2020-10-27 PROCEDURE — 82533 TOTAL CORTISOL: CPT

## 2020-10-27 PROCEDURE — 80053 COMPREHEN METABOLIC PANEL: CPT

## 2020-10-27 PROCEDURE — 84300 ASSAY OF URINE SODIUM: CPT

## 2020-10-27 PROCEDURE — 84100 ASSAY OF PHOSPHORUS: CPT

## 2020-10-27 PROCEDURE — 82570 ASSAY OF URINE CREATININE: CPT

## 2020-10-27 PROCEDURE — 82088 ASSAY OF ALDOSTERONE: CPT

## 2020-10-27 RX ORDER — KETOROLAC TROMETHAMINE 30 MG/ML
60 INJECTION, SOLUTION INTRAMUSCULAR; INTRAVENOUS ONCE
Status: COMPLETED | OUTPATIENT
Start: 2020-10-27 | End: 2020-10-27

## 2020-10-27 RX ADMIN — KETOROLAC TROMETHAMINE 60 MG: 30 INJECTION, SOLUTION INTRAMUSCULAR; INTRAVENOUS at 09:55

## 2020-10-27 ASSESSMENT — FIBROSIS 4 INDEX: FIB4 SCORE: 11.21

## 2020-10-27 NOTE — PROGRESS NOTES
Subjective:      Donna Isaac is a 55 y.o. female who presents with No chief complaint on file.            HPI  Here for Botox #5 treatment today.     Migraines are improving with healing from the sinus surgery.      Current Outpatient Medications   Medication Sig Dispense Refill   • spironolactone (ALDACTONE) 25 MG Tab Take 1 Tab by mouth every day. 30 Tab 3   • doxazosin (CARDURA) 2 MG Tab Take 1 Tab by mouth every day. 90 Tab 3   • furosemide (LASIX) 20 MG Tab Take 1 Tab by mouth every day. 30 Tab 11   • JARDIANCE 10 MG Tab Take 10 mg by mouth every day.     • fluticasone (FLONASE) 50 MCG/ACT nasal spray Spray 1 Spray in nose 2 times a day.     • Guaifenesin 400 MG Tab Take 400 mg by mouth every 8 hours as needed. Indications: Cough     • amitriptyline (ELAVIL) 10 MG Tab Take 10-20 mg by mouth 2 Times a Day. 10 mg every morning  20 mg every night     • tramadol (ULTRAM) 50 MG Tab Take  mg by mouth every 6 hours as needed for Mild Pain.     • dexamethasone (DECADRON) 0.5 MG Tab Take 0.5 mg by mouth every bedtime.     • famotidine (PEPCID) 20 MG Tab Take 40 mg by mouth every bedtime. 2 tablets = 40 mg     • carvedilol (COREG) 25 MG Tab Take 25 mg by mouth 2 times a day, with meals.     • losartan (COZAAR) 100 MG Tab Take 1 Tab by mouth every day. 30 Tab 2   • DULoxetine (CYMBALTA) 60 MG Cap DR Particles delayed-release capsule Take 60 mg by mouth every evening.     • Frovatriptan Succinate (FROVA) 2.5 MG Tab Take 2.5 mg by mouth as needed (For migraines).     • montelukast (SINGULAIR) 10 MG Tab Take 10 mg by mouth every evening.     • pantoprazole (PROTONIX) 40 MG Tablet Delayed Response Take 40 mg by mouth every day.     • tizanidine (ZANAFLEX) 4 MG Tab Take 2-4 mg by mouth every 6 hours as needed. Indications: Muscle Spasticity     • calcitonin, salmon, (MIACALCIN) 200 UNIT/ACT Solution Spray 1 Spray in nose every bedtime.  12   • meloxicam (MOBIC) 15 MG tablet Take 15 mg by mouth every evening.      • colesevelam (WELCHOL) 625 MG Tab Take 625 mg by mouth 3 times a day, with meals.     • cevimeline (EVOXAC) 30 MG capsule Take 1 Cap by mouth 3 times a day. 270 Cap 0   • AIMOVIG 140 MG/ML Solution Auto-injector 140 mg by Injection route Q30 DAYS.     • estradiol (ESTRACE) 0.5 MG tablet Take 0.25 mg by mouth every day. 0.5 tablet = 0.25 mg     • SYNTHROID 75 MCG Tab Take 75 mcg by mouth Every morning on an empty stomach.     • gabapentin (NEURONTIN) 400 MG Cap Take 1 Cap by mouth 3 times a day. 180 Cap 3   • Somatropin 10 MG Recon Soln Inject 0.3 mg as instructed every bedtime.       No current facility-administered medications for this visit.        ROS       Objective:     /74 (BP Location: Left arm)   Pulse 74   Temp 36.8 °C (98.2 °F) (Temporal)   Resp 15   Wt 95.2 kg (209 lb 14.1 oz)   LMP 02/07/2016 (Within Months)   SpO2 95%   BMI 36.03 kg/m²      Physical Exam            Assessment/Plan:        Chronic Migraine:  Botox therapy has reduced patient’s migraines by more than 7 days and/or 100 hours per month.  Additionally pt has noted a reduction in the amount of medication they are taking to treat their migraines and/or they are having a reduction in intractable migraine/status migrainosis which can result in urgent care or ER visits.     Documentation of patient's symptoms with migraine are included in the initial note with this patient including the following:  Education/counseling/reduction in medications if pt has medication overuse headache;  Frequency of headaches is >15 days monthly with at least 8 migraines monthly;  Migraines include at least two of the following: worsened with activity or avoidance of activity with migraines (ie they go lie down), moderate to severe pain intensity, pulsing headache, unilateral headache AND they have either nausea or vomiting OR sensitivity to light and sound     Although this patient is responding to botox they are NOT migraine free, however, and it is  my recommendation botox be continued at this time     I treated this patient in clinic today with BotoxA 155 units according to the dosing/injection paradigm currently mandated by the FDA for the management of chronic migraine. Specifically, I injected 5 units to the procerus, 5 units to the corrugators bilaterally, a total of 20 units to the frontalis musculature, 20 units to the temporalis bilaterally, 15 units to the occipitalis bilaterally, 10 units to the cervical paraspinals bilaterally and 15 units to the trapezius musculature bilaterally. The remainder of the Botox 200 units mixed but not administered was discarded as wastage per FDA guidelines.      Toradol 60mg IM provided per MA under direct physician supervision.     Return for follow-up in 12 weeks for 5th set of Botox.

## 2020-10-29 LAB
ALDOST SERPL-MCNC: 8.8 NG/DL
CALCIUM 24H UR-MCNC: 5.1 MG/DL
CALCIUM 24H UR-MRATE: NORMAL MG/D
CALCIUM/CREAT 24H UR: 88 MG/G (ref 20–300)
COLLECT DURATION TIME SPEC: NORMAL HRS
COLLECT DURATION TIME SPEC: NORMAL HRS
CREAT 24H UR-MCNC: 58 MG/DL
CREAT 24H UR-MRATE: NORMAL MG/D (ref 500–1400)
PHOSPHATE 24H UR-MCNC: 55 MG/DL
PHOSPHATE 24H UR-MRATE: NORMAL MG/D (ref 400–1300)
PHOSPHATE/CREAT 24H UR: 948 MG/G
SPECIMEN VOL ?TM UR: NORMAL ML
TOTAL VOLUME 1105: NORMAL ML

## 2020-11-18 ENCOUNTER — APPOINTMENT (OUTPATIENT)
Dept: BEHAVIORAL HEALTH | Facility: CLINIC | Age: 55
End: 2020-11-18
Payer: MEDICARE

## 2020-11-20 ENCOUNTER — HOSPITAL ENCOUNTER (OUTPATIENT)
Dept: LAB | Facility: MEDICAL CENTER | Age: 55
End: 2020-11-20
Attending: INTERNAL MEDICINE
Payer: MEDICARE

## 2020-11-20 LAB
ALBUMIN SERPL BCP-MCNC: 3.9 G/DL (ref 3.2–4.9)
ALBUMIN/GLOB SERPL: 1.6 G/DL
ALP SERPL-CCNC: 102 U/L (ref 30–99)
ALT SERPL-CCNC: 21 U/L (ref 2–50)
ANION GAP SERPL CALC-SCNC: 11 MMOL/L (ref 7–16)
AST SERPL-CCNC: 19 U/L (ref 12–45)
BILIRUB SERPL-MCNC: 0.2 MG/DL (ref 0.1–1.5)
BUN SERPL-MCNC: 13 MG/DL (ref 8–22)
CALCIUM SERPL-MCNC: 8.9 MG/DL (ref 8.5–10.5)
CHLORIDE SERPL-SCNC: 103 MMOL/L (ref 96–112)
CO2 SERPL-SCNC: 19 MMOL/L (ref 20–33)
CORTIS SERPL-MCNC: 1.4 UG/DL (ref 0–23)
CREAT SERPL-MCNC: 1.23 MG/DL (ref 0.5–1.4)
CREAT UR-MCNC: 152.06 MG/DL
CRP SERPL HS-MCNC: 0.27 MG/DL (ref 0–0.75)
DHEA-S SERPL-MCNC: 4 UG/DL (ref 18.9–205)
GLOBULIN SER CALC-MCNC: 2.5 G/DL (ref 1.9–3.5)
GLUCOSE SERPL-MCNC: 81 MG/DL (ref 65–99)
PHOSPHATE SERPL-MCNC: 3.8 MG/DL (ref 2.5–4.5)
POTASSIUM SERPL-SCNC: 4.1 MMOL/L (ref 3.6–5.5)
POTASSIUM UR-SCNC: 38 MMOL/L
PROLACTIN SERPL-MCNC: 20.5 NG/ML (ref 2.8–26)
PROT SERPL-MCNC: 6.4 G/DL (ref 6–8.2)
SODIUM SERPL-SCNC: 133 MMOL/L (ref 135–145)
SODIUM UR-SCNC: 80 MMOL/L

## 2020-11-20 PROCEDURE — 86038 ANTINUCLEAR ANTIBODIES: CPT

## 2020-11-20 PROCEDURE — 82570 ASSAY OF URINE CREATININE: CPT

## 2020-11-20 PROCEDURE — 83525 ASSAY OF INSULIN: CPT

## 2020-11-20 PROCEDURE — 82088 ASSAY OF ALDOSTERONE: CPT

## 2020-11-20 PROCEDURE — 84105 ASSAY OF URINE PHOSPHORUS: CPT

## 2020-11-20 PROCEDURE — 82533 TOTAL CORTISOL: CPT

## 2020-11-20 PROCEDURE — 86140 C-REACTIVE PROTEIN: CPT

## 2020-11-20 PROCEDURE — 84300 ASSAY OF URINE SODIUM: CPT

## 2020-11-20 PROCEDURE — 80053 COMPREHEN METABOLIC PANEL: CPT

## 2020-11-20 PROCEDURE — 82340 ASSAY OF CALCIUM IN URINE: CPT

## 2020-11-20 PROCEDURE — 84100 ASSAY OF PHOSPHORUS: CPT

## 2020-11-20 PROCEDURE — 84146 ASSAY OF PROLACTIN: CPT

## 2020-11-20 PROCEDURE — 36415 COLL VENOUS BLD VENIPUNCTURE: CPT

## 2020-11-20 PROCEDURE — 84133 ASSAY OF URINE POTASSIUM: CPT

## 2020-11-20 PROCEDURE — 82627 DEHYDROEPIANDROSTERONE: CPT

## 2020-11-22 LAB — NUCLEAR IGG SER QL IA: NORMAL

## 2020-11-23 LAB
ALDOST SERPL-MCNC: 67.7 NG/DL
CALCIUM 24H UR-MCNC: 2.4 MG/DL
CALCIUM 24H UR-MRATE: ABNORMAL MG/D
CALCIUM/CREAT 24H UR: 18 MG/G (ref 20–300)
COLLECT DURATION TIME SPEC: ABNORMAL HRS
COLLECT DURATION TIME SPEC: NORMAL HRS
CREAT 24H UR-MCNC: 134 MG/DL
CREAT 24H UR-MRATE: ABNORMAL MG/D (ref 500–1400)
INSULIN P FAST SERPL-ACNC: 6 UIU/ML (ref 3–19)
PHOSPHATE 24H UR-MCNC: 165 MG/DL
PHOSPHATE 24H UR-MRATE: NORMAL MG/D (ref 400–1300)
PHOSPHATE/CREAT 24H UR: 1231 MG/G
SPECIMEN VOL ?TM UR: ABNORMAL ML
TOTAL VOLUME 1105: NORMAL ML

## 2020-11-30 ENCOUNTER — OFFICE VISIT (OUTPATIENT)
Dept: VASCULAR LAB | Facility: MEDICAL CENTER | Age: 55
End: 2020-11-30
Attending: INTERNAL MEDICINE
Payer: MEDICARE

## 2020-11-30 DIAGNOSIS — I10 ESSENTIAL HYPERTENSION: ICD-10-CM

## 2020-11-30 PROCEDURE — 99213 OFFICE O/P EST LOW 20 MIN: CPT | Mod: 95,CR | Performed by: INTERNAL MEDICINE

## 2020-11-30 RX ORDER — SPIRONOLACTONE 25 MG/1
50 TABLET ORAL DAILY
Qty: 30 TAB | Refills: 3 | Status: ON HOLD
Start: 2020-11-30 | End: 2021-04-28

## 2020-11-30 ASSESSMENT — ENCOUNTER SYMPTOMS
WEIGHT LOSS: 0
SENSORY CHANGE: 0
DEPRESSION: 0
FOCAL WEAKNESS: 0
PALPITATIONS: 0
SPEECH CHANGE: 0
COUGH: 0
NERVOUS/ANXIOUS: 0
DIZZINESS: 1
SHORTNESS OF BREATH: 0
BACK PAIN: 1
HEADACHES: 1

## 2020-11-30 NOTE — PROGRESS NOTES
Follow Up VASCULAR VISIT  Subjective:   Donna Isaac is a 54 y.o. female who presents today 11-30-20 for blood pressure problem    This visit was conducted via Facetime video call using secure and encrypted video conferencing technology due to covid-19 restrictions.   The patient was in a private location in the state [facility].    The patient's identity was confirmed and verbal consent was obtained for this virtual visit.    HPI:  Here for f/u of htn in settting of takasubo's cm, thyroid disease, and dm.  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   She has an appointment to see a counselor for ptsd    Social History     Tobacco Use   Smoking Status Never Smoker   Smokeless Tobacco Never Used     DIET AND EXERCISE:  Weight Change: down a few pounds  Diet: common adult  Exercise: minimal exercise     Review of Systems   Constitutional: Negative for malaise/fatigue and weight loss.   Respiratory: Negative for cough and shortness of breath.    Cardiovascular: Negative for chest pain, palpitations and leg swelling.   Musculoskeletal: Positive for back pain and joint pain.   Neurological: Positive for dizziness and headaches. Negative for sensory change, speech change and focal weakness.   Psychiatric/Behavioral: Negative for depression. The patient is not nervous/anxious.       Objective:     There were no vitals filed for  this visit.   There is no height or weight on file to calculate BMI.  Physical Exam  Constitutional:       General: She is not in acute distress.     Appearance: She is well-developed. She is not diaphoretic.   Eyes:      General: No scleral icterus.     Extraocular Movements: Extraocular movements intact.      Conjunctiva/sclera: Conjunctivae normal.   Neck:      Thyroid: No thyromegaly.      Vascular: No JVD.   Pulmonary:      Effort: Pulmonary effort is normal. No respiratory distress.   Musculoskeletal:      Comments: Right foot in boot   Neurological:      General: No focal deficit present.      Mental Status: She is alert and oriented to person, place, and time.      Cranial Nerves: No cranial nerve deficit.      Coordination: Coordination normal.      Deep Tendon Reflexes: Reflexes are normal and symmetric.   Psychiatric:         Mood and Affect: Mood normal.         Behavior: Behavior normal.       Lab Results   Component Value Date    CHOLSTRLTOT 193 08/26/2019    LDL 65 08/26/2019    HDL 92 08/26/2019    TRIGLYCERIDE 179 (H) 08/26/2019      Lab Results   Component Value Date    PROTHROMBTM 12.3 12/10/2019    INR 0.91 12/10/2019       Lab Results   Component Value Date    HBA1C 6.0 (H) 06/19/2020      Lab Results   Component Value Date    SODIUM 133 (L) 11/20/2020    POTASSIUM 4.1 11/20/2020    CHLORIDE 103 11/20/2020    CO2 19 (L) 11/20/2020    GLUCOSE 81 11/20/2020    BUN 13 11/20/2020    CREATININE 1.23 11/20/2020    IFAFRICA 55 (A) 11/20/2020    IFNOTAFR 45 (A) 11/20/2020      Lab Results   Component Value Date    ALDOSTERONE 67.7 11/20/2020    PRA - 5.5  Plasma mets - normal    Lab Results   Component Value Date    WBC 6.7 09/28/2020    RBC 5.16 09/28/2020    HEMOGLOBIN 13.8 09/28/2020    HEMATOCRIT 45.2 09/28/2020    MCV 87.6 09/28/2020    MCH 26.7 (L) 09/28/2020    MCHC 30.5 (L) 09/28/2020    MPV 11.8 09/28/2020      Echo march 2020  Normal left ventricular systolic function. Left ventricular  ejection   fraction is visually estimated to be 70%.  Normal diastolic function.  Ascending aorta is dilated with a diameter of 4.3  cm.    ABPM March 2020  Ambulatory blood pressure monitor results reviewed  Full report under media tab  Reasonable data acquisition  Mean daytime: 164/101 consistent with poorly controlled mixed systolic and diastolic out of office blood pressure  Nocturnal dip: Appears to be present based upon the curves but not on the averages  Did have a precipitous fall in blood pressure during early morning hours without increase in heart rate    Medical Decision Making:  Today's Assessment / Status / Plan:     1. Essential hypertension  spironolactone (ALDACTONE) 25 MG Tab     Patient Type: Primary Prevention    Etiology of Established CVD if Present: Recurrent stress-induced cardiomyopathy    Lipid Management: Qualifies for Statin Therapy Based on 2013 ACC/AHA Guidelines: no  Calculated 10-Year Risk of ASCVD: 2  Currently on Statin: No  Clean coronaries on catheterization by her report in the past  No clear indication for statin therapy  -Continue lifestyle modification    Blood Pressure Management:  ACC-AHA blood pressure goal less than 130/80  long history of labile blood pressure  Given her history of stress-induced cardiomyopathy, I think this likely represents 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 cortisol is normal  Aldosterone renin ratio not consistent with primary aldosteronism  No albuminuria  Endo recently increased her spironolactone dose  BP at home is close to optimal - would not increase therapy due to risk of increasing lihghteadedness  Plan:  -Continue home blood pressure monitoring  -continue doxazosin 2 mg each  night  -Continue carvedilol  -Continue spironolactone  - continue losartan  - continue furosemide for now - patient thinks it helps her swelling - but consider change to hctz in future  -Defer decisions about how to potentially manage her steroids to endo  - consider abpm in future    Glycemic Status: Prediabetes  - continue jardiance per endo    Anti-Platelet/Anti-Coagulant Tx: Not currently indicated    Smoking: Continue complete avoidance    Physical Activity: Defer recommendations to her orthopedic surgeon    Weight Management and Nutrition: Dietary plan was discussed with patient at this visit including Mediterranean-style diet low in sodium    Other:     1) stress-induced cardiomyopathy, recurrent -most recent ejection fraction is normal.  No signs or symptoms of heart failure on exam currently.  She has had a normal Holter monitor and cardiac cath by report  -Continue current neurohormonal therapy  -Defer any other further work-up and management to cardiology    2) hypothyroidism -defer management to endocrinology    3) sinus cyst and migraine headache - previously improved after surgery.  Now with some worsening.  She thinks she has a repeat sinus infection.  She does have follow-up with ENT shortly.  Defer management to ENT and neurology    4) multiple orthopedic issues -defer management to orthopedics    5) fibromyalgia -defer management to neurology and PCP    6) PTSD -symptoms appear stable.  She has appointment to see a counselor.  Otherwise defer management to PCP    Instructed to follow-up with PCP for remainder of adult medical needs: yes  We will partner with other providers in the management of established vascular disease and cardiometabolic risk factors.    Studies to Be Obtained: none  Labs to Be Obtained: None currently    Follow up in: 4 months or as needed    Michael J Bloch, M.D.     Cc:  DOUGIE Butler

## 2020-12-09 NOTE — CARE PLAN
Problem: Communication  Goal: The ability to communicate needs accurately and effectively will improve  Outcome: PROGRESSING AS EXPECTED  Note:   Allow time for patient to verbalize feelings and ask questions. Answer questions to best of ability. Update patient on plan of care.       Problem: Safety  Goal: Will remain free from injury  Outcome: PROGRESSING AS EXPECTED  Note:   Keep call light within reach. Ensure environment is clutter free. Have patient wear treaded socks.      
Aisha Noel

## 2020-12-23 ENCOUNTER — HOSPITAL ENCOUNTER (OUTPATIENT)
Facility: MEDICAL CENTER | Age: 55
End: 2020-12-23
Attending: OTOLARYNGOLOGY
Payer: MEDICARE

## 2020-12-23 DIAGNOSIS — G43.709 CHRONIC MIGRAINE W/O AURA W/O STATUS MIGRAINOSUS, NOT INTRACTABLE: ICD-10-CM

## 2020-12-23 PROCEDURE — 87186 SC STD MICRODIL/AGAR DIL: CPT | Mod: 91

## 2020-12-23 PROCEDURE — 87070 CULTURE OTHR SPECIMN AEROBIC: CPT

## 2020-12-23 PROCEDURE — 87205 SMEAR GRAM STAIN: CPT

## 2020-12-23 PROCEDURE — 87077 CULTURE AEROBIC IDENTIFY: CPT | Mod: 91

## 2020-12-23 PROCEDURE — 87075 CULTR BACTERIA EXCEPT BLOOD: CPT

## 2020-12-24 LAB
GRAM STN SPEC: NORMAL
SIGNIFICANT IND 70042: NORMAL
SITE SITE: NORMAL
SOURCE SOURCE: NORMAL

## 2020-12-30 ENCOUNTER — APPOINTMENT (OUTPATIENT)
Dept: RADIOLOGY | Facility: MEDICAL CENTER | Age: 55
End: 2020-12-30
Attending: EMERGENCY MEDICINE
Payer: MEDICARE

## 2020-12-30 ENCOUNTER — HOSPITAL ENCOUNTER (EMERGENCY)
Facility: MEDICAL CENTER | Age: 55
End: 2020-12-30
Attending: EMERGENCY MEDICINE
Payer: MEDICARE

## 2020-12-30 VITALS
DIASTOLIC BLOOD PRESSURE: 95 MMHG | HEIGHT: 64 IN | TEMPERATURE: 97.3 F | HEART RATE: 109 BPM | RESPIRATION RATE: 18 BRPM | BODY MASS INDEX: 38.01 KG/M2 | WEIGHT: 222.66 LBS | SYSTOLIC BLOOD PRESSURE: 136 MMHG | OXYGEN SATURATION: 96 %

## 2020-12-30 DIAGNOSIS — J45.41 MODERATE PERSISTENT ASTHMA WITH ACUTE EXACERBATION: ICD-10-CM

## 2020-12-30 DIAGNOSIS — N30.00 ACUTE CYSTITIS WITHOUT HEMATURIA: ICD-10-CM

## 2020-12-30 LAB
ALBUMIN SERPL BCP-MCNC: 3.8 G/DL (ref 3.2–4.9)
ALBUMIN/GLOB SERPL: 1.2 G/DL
ALP SERPL-CCNC: 151 U/L (ref 30–99)
ALT SERPL-CCNC: 31 U/L (ref 2–50)
ANION GAP SERPL CALC-SCNC: 14 MMOL/L (ref 7–16)
APPEARANCE UR: ABNORMAL
AST SERPL-CCNC: 21 U/L (ref 12–45)
BACTERIA #/AREA URNS HPF: ABNORMAL /HPF
BASOPHILS # BLD AUTO: 0.1 % (ref 0–1.8)
BASOPHILS # BLD: 0.01 K/UL (ref 0–0.12)
BILIRUB SERPL-MCNC: <0.2 MG/DL (ref 0.1–1.5)
BILIRUB UR QL STRIP.AUTO: NEGATIVE
BUN SERPL-MCNC: 22 MG/DL (ref 8–22)
CALCIUM SERPL-MCNC: 9.1 MG/DL (ref 8.4–10.2)
CHLORIDE SERPL-SCNC: 103 MMOL/L (ref 96–112)
CO2 SERPL-SCNC: 21 MMOL/L (ref 20–33)
COLOR UR: YELLOW
COVID ORDER STATUS COVID19: NORMAL
CREAT SERPL-MCNC: 1.45 MG/DL (ref 0.5–1.4)
EOSINOPHIL # BLD AUTO: 0.05 K/UL (ref 0–0.51)
EOSINOPHIL NFR BLD: 0.5 % (ref 0–6.9)
EPI CELLS #/AREA URNS HPF: ABNORMAL /HPF
ERYTHROCYTE [DISTWIDTH] IN BLOOD BY AUTOMATED COUNT: 61 FL (ref 35.9–50)
FLUAV RNA SPEC QL NAA+PROBE: NEGATIVE
FLUBV RNA SPEC QL NAA+PROBE: NEGATIVE
GLOBULIN SER CALC-MCNC: 3.3 G/DL (ref 1.9–3.5)
GLUCOSE SERPL-MCNC: 144 MG/DL (ref 65–99)
GLUCOSE UR STRIP.AUTO-MCNC: 500 MG/DL
HCT VFR BLD AUTO: 44 % (ref 37–47)
HGB BLD-MCNC: 13.8 G/DL (ref 12–16)
IMM GRANULOCYTES # BLD AUTO: 0.07 K/UL (ref 0–0.11)
IMM GRANULOCYTES NFR BLD AUTO: 0.7 % (ref 0–0.9)
KETONES UR STRIP.AUTO-MCNC: ABNORMAL MG/DL
LACTATE BLD-SCNC: 2.2 MMOL/L (ref 0.5–2)
LACTATE BLD-SCNC: 5.1 MMOL/L (ref 0.5–2)
LEUKOCYTE ESTERASE UR QL STRIP.AUTO: NEGATIVE
LYMPHOCYTES # BLD AUTO: 1.18 K/UL (ref 1–4.8)
LYMPHOCYTES NFR BLD: 11.6 % (ref 22–41)
MCH RBC QN AUTO: 29.3 PG (ref 27–33)
MCHC RBC AUTO-ENTMCNC: 31.4 G/DL (ref 33.6–35)
MCV RBC AUTO: 93.4 FL (ref 81.4–97.8)
MICRO URNS: ABNORMAL
MONOCYTES # BLD AUTO: 0.79 K/UL (ref 0–0.85)
MONOCYTES NFR BLD AUTO: 7.8 % (ref 0–13.4)
MUCOUS THREADS #/AREA URNS HPF: ABNORMAL /HPF
NEUTROPHILS # BLD AUTO: 8.08 K/UL (ref 2–7.15)
NEUTROPHILS NFR BLD: 79.3 % (ref 44–72)
NITRITE UR QL STRIP.AUTO: POSITIVE
NRBC # BLD AUTO: 0 K/UL
NRBC BLD-RTO: 0 /100 WBC
NT-PROBNP SERPL IA-MCNC: 95 PG/ML (ref 0–125)
PH UR STRIP.AUTO: 6 [PH] (ref 5–8)
PLATELET # BLD AUTO: 239 K/UL (ref 164–446)
PMV BLD AUTO: 10.3 FL (ref 9–12.9)
POTASSIUM SERPL-SCNC: 3.9 MMOL/L (ref 3.6–5.5)
PROT SERPL-MCNC: 7.1 G/DL (ref 6–8.2)
PROT UR QL STRIP: NEGATIVE MG/DL
RBC # BLD AUTO: 4.71 M/UL (ref 4.2–5.4)
RBC # URNS HPF: ABNORMAL /HPF
RBC UR QL AUTO: NEGATIVE
RSV RNA SPEC QL NAA+PROBE: NEGATIVE
SARS-COV-2 RNA RESP QL NAA+PROBE: NOTDETECTED
SODIUM SERPL-SCNC: 138 MMOL/L (ref 135–145)
SP GR UR STRIP.AUTO: 1.02
SPECIMEN SOURCE: NORMAL
WBC # BLD AUTO: 10.2 K/UL (ref 4.8–10.8)
WBC #/AREA URNS HPF: ABNORMAL /HPF

## 2020-12-30 PROCEDURE — 87040 BLOOD CULTURE FOR BACTERIA: CPT | Mod: 91

## 2020-12-30 PROCEDURE — 96374 THER/PROPH/DIAG INJ IV PUSH: CPT

## 2020-12-30 PROCEDURE — 83605 ASSAY OF LACTIC ACID: CPT

## 2020-12-30 PROCEDURE — 85025 COMPLETE CBC W/AUTO DIFF WBC: CPT

## 2020-12-30 PROCEDURE — 94640 AIRWAY INHALATION TREATMENT: CPT

## 2020-12-30 PROCEDURE — 700101 HCHG RX REV CODE 250: Performed by: EMERGENCY MEDICINE

## 2020-12-30 PROCEDURE — 94760 N-INVAS EAR/PLS OXIMETRY 1: CPT

## 2020-12-30 PROCEDURE — 0241U HCHG SARS-COV-2 COVID-19 NFCT DS RESP RNA 4 TRGT MIC: CPT

## 2020-12-30 PROCEDURE — 700111 HCHG RX REV CODE 636 W/ 250 OVERRIDE (IP): Performed by: EMERGENCY MEDICINE

## 2020-12-30 PROCEDURE — C9803 HOPD COVID-19 SPEC COLLECT: HCPCS | Performed by: EMERGENCY MEDICINE

## 2020-12-30 PROCEDURE — 87186 SC STD MICRODIL/AGAR DIL: CPT

## 2020-12-30 PROCEDURE — 83880 ASSAY OF NATRIURETIC PEPTIDE: CPT

## 2020-12-30 PROCEDURE — 87077 CULTURE AEROBIC IDENTIFY: CPT

## 2020-12-30 PROCEDURE — 80053 COMPREHEN METABOLIC PANEL: CPT

## 2020-12-30 PROCEDURE — 700105 HCHG RX REV CODE 258: Performed by: EMERGENCY MEDICINE

## 2020-12-30 PROCEDURE — 81001 URINALYSIS AUTO W/SCOPE: CPT

## 2020-12-30 PROCEDURE — 87086 URINE CULTURE/COLONY COUNT: CPT

## 2020-12-30 PROCEDURE — 36415 COLL VENOUS BLD VENIPUNCTURE: CPT

## 2020-12-30 PROCEDURE — 71045 X-RAY EXAM CHEST 1 VIEW: CPT

## 2020-12-30 PROCEDURE — 99284 EMERGENCY DEPT VISIT MOD MDM: CPT

## 2020-12-30 RX ORDER — METHYLPREDNISOLONE SODIUM SUCCINATE 125 MG/2ML
125 INJECTION, POWDER, LYOPHILIZED, FOR SOLUTION INTRAMUSCULAR; INTRAVENOUS ONCE
Status: COMPLETED | OUTPATIENT
Start: 2020-12-30 | End: 2020-12-30

## 2020-12-30 RX ORDER — SODIUM CHLORIDE 9 MG/ML
1000 INJECTION, SOLUTION INTRAVENOUS ONCE
Status: COMPLETED | OUTPATIENT
Start: 2020-12-30 | End: 2020-12-30

## 2020-12-30 RX ORDER — CEFDINIR 300 MG/1
300 CAPSULE ORAL 2 TIMES DAILY
Qty: 14 CAP | Refills: 0 | Status: SHIPPED | OUTPATIENT
Start: 2020-12-30 | End: 2021-01-06

## 2020-12-30 RX ORDER — PREDNISONE 20 MG/1
60 TABLET ORAL DAILY
Qty: 12 TAB | Refills: 0 | Status: SHIPPED | OUTPATIENT
Start: 2020-12-30 | End: 2021-01-03

## 2020-12-30 RX ORDER — IPRATROPIUM BROMIDE AND ALBUTEROL SULFATE 2.5; .5 MG/3ML; MG/3ML
3 SOLUTION RESPIRATORY (INHALATION)
Status: DISCONTINUED | OUTPATIENT
Start: 2020-12-30 | End: 2020-12-31 | Stop reason: HOSPADM

## 2020-12-30 RX ADMIN — METHYLPREDNISOLONE SODIUM SUCCINATE 125 MG: 125 INJECTION, POWDER, FOR SOLUTION INTRAMUSCULAR; INTRAVENOUS at 19:36

## 2020-12-30 RX ADMIN — IPRATROPIUM BROMIDE AND ALBUTEROL SULFATE 3 ML: .5; 3 SOLUTION RESPIRATORY (INHALATION) at 19:44

## 2020-12-30 RX ADMIN — IPRATROPIUM BROMIDE AND ALBUTEROL SULFATE 3 ML: .5; 3 SOLUTION RESPIRATORY (INHALATION) at 19:36

## 2020-12-30 RX ADMIN — SODIUM CHLORIDE 1000 ML: 9 INJECTION, SOLUTION INTRAVENOUS at 21:07

## 2020-12-30 ASSESSMENT — FIBROSIS 4 INDEX: FIB4 SCORE: 1

## 2020-12-30 NOTE — LETTER
1/2/2021               Donna Marenmontrell Isaac  [address]  [address]  [facility] [address]        Dear Donna (MR#[id number])    This letter is sent in regards to your, recent visit to the [facility] Emergency Department on 12/30/2020.  During the visit, tests were performed to assist the physician in a medical diagnosis.  A review of those tests requires that we notify you of the following:    Your urine culture was POSITIVE for a bacteria called Escherichia coli. The antibiotic prescribed for you (cefdinir) should be active to treat this bacteria. IT IS IMPORTANT THAT YOU CONTINUE TAKING YOUR ANTIBIOTIC UNTIL IT IS FINISHED.      Please feel free to contact me at the number below if you have any questions or concerns. Thank you for your cooperation in the matter.    Sincerely,  ED Culture Follow-Up Staff  Kentrell Garcia PharmD    [facility], Emergency Department  [facility] [place], [place] [address]  [phone] (ED Culture Line)  [phone]

## 2020-12-31 NOTE — ED TRIAGE NOTES
Pt amb to triage c/o sob and dyspnea on exertion; pt has hx asthma and uses inhaler and neb tx at home (last used apporx x3h ago). Pt unable to speak more than 3-5 words sentences. Pt has recent sinus surgery x7d ago for sinusitis; per Dr Butler.
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
## 2021-01-02 NOTE — ED NOTES
"ED Positive Culture Follow-up/Notification Note:    Date: 1/2/21     Patient seen in the ED on 12/30/2020 for SOB x 5D. Pt also described general body aching endorsing that all of her muscles and joints are painful. She had a non-productive cough while in the ED. Upon further workup, her UA was positive. No noted s/s of urinary sx and pt was AF while in ED.  1. Moderate persistent asthma with acute exacerbation    2. Acute cystitis without hematuria       Discharge Medication List as of 12/30/2020 10:13 PM      START taking these medications    Details   cefdinir (OMNICEF) 300 MG Cap Take 1 Cap by mouth 2 times a day for 7 days., Disp-14 Cap, R-0, Normal      predniSONE (DELTASONE) 20 MG Tab Take 3 Tabs by mouth every day for 4 days., Disp-12 Tab, R-0, Normal             Allergies: Bactrim [sulfamethoxazole w-trimethoprim], Bee venom, Buprenorphine, Doxycycline, Econazole, Flagyl  [metronidazole], Floxin otic, Floxin [ofloxacin], Gadolinium derivatives, Iodine, Keflex, Levaquin, Levofloxacin, Morphine, Naloxone, Nitrofurantoin, Norco [apap-fd&c yellow #10 al damon-hydrocodone], Oxycodone, Paricalcitol, Penicillins, Tape, Ancef [cefazolin], Azithromycin, Bextra [valdecoxib], Flagyl [kdc:metronidazole+tartrazine], Gadolinium, Linezolid, Nyquil, Other drug, Other misc, Clindamycin, Clindamycin hcl, Codeine, Iodinated diagnostic agents  [diagnostic x-ray materials], Sulfa drugs, Tramadol, and Tygacil [tigecycline]     Vitals:    12/30/20 1846 12/30/20 1950 12/30/20 2030   BP: 136/95     Pulse: (!) 128 (!) 121 (!) 109   Resp: (!) 22 16 18   Temp: 36.3 °C (97.3 °F)     SpO2: 95% 97% 96%   Weight: 101 kg (222 lb 10.6 oz)     Height: 1.626 m (5' 4\")         Final cultures:   Results     Procedure Component Value Units Date/Time    URINE CULTURE(NEW) [[id number]]  (Abnormal)  (Susceptibility) Collected: 12/30/20 2040    Order Status: Completed Specimen: Urine Updated: 01/02/21 1045     Significant Indicator POS     Source UR " "    Site -     Culture Result -      Escherichia coli  >100,000 cfu/mL      Narrative:      Indication for culture:->Evaluation for sepsis without a  clear source of infection  Indication for culture:->Evaluation for sepsis without a    Susceptibility     Escherichia coli (1)     Antibiotic Interpretation Microscan Method Status    Ampicillin Sensitive <=8 mcg/mL DOYLE Final    Ceftriaxone Sensitive <=1 mcg/mL DOYLE Final    Ceftazidime Sensitive <=1 mcg/mL DOYLE Final    Cefotaxime Sensitive <=2 mcg/mL DOYLE Final    Cefazolin Sensitive <=2 mcg/mL DOYLE Final    Ciprofloxacin Sensitive <=1 mcg/mL DOYLE Final    Ampicillin/sulbactam Sensitive <=8/4 mcg/mL DOYLE Final    Cefepime Sensitive <=2 mcg/mL DOYLE Final    Tobramycin Sensitive <=4 mcg/mL DOYLE Final    Cefotetan Sensitive <=16 mcg/mL DOYLE Final    Nitrofurantoin Sensitive <=32 mcg/mL DOYLE Final    Gentamicin Sensitive <=4 mcg/mL DOYLE Final    Levofloxacin Sensitive <=2 mcg/mL DOYLE Final    Pip/Tazobactam Sensitive <=16 mcg/mL DOYLE Final    Trimeth/Sulfa Sensitive <=2/38 mcg/mL DOYLE Final                   BLOOD CULTURE [[id number]] Collected: 12/30/20 1515    Order Status: Completed Specimen: Blood from Peripheral Updated: 12/31/20 0914     Significant Indicator NEG     Source BLD     Site PERIPHERAL     Culture Result No Growth  Note: Blood cultures are incubated for 5 days and  are monitored continuously.Positive blood cultures  are called to the RN and reported as soon as  they are identified.  Blood culture testing and Gram stain, if indicated, are  performed at [facility], [address] [place] [address].  Positive blood cultures are  sent to [facility], [address] [place] [address], for organism identification and  susceptibility testing.      Narrative:      Per Hospital Policy: Only change Specimen Src: to \"Line\" if  specified by physician order.  Left AC    BLOOD CULTURE [[id number]] Collected: 12/30/20 1911    Order " "Status: Completed Specimen: Blood from Peripheral Updated: 12/31/20 0914     Significant Indicator NEG     Source BLD     Site PERIPHERAL     Culture Result No Growth  Note: Blood cultures are incubated for 5 days and  are monitored continuously.Positive blood cultures  are called to the RN and reported as soon as  they are identified.  Blood culture testing and Gram stain, if indicated, are  performed at [facility], [address] [place] [address].  Positive blood cultures are  sent to [facility], [address] [place] [address], for organism identification and  susceptibility testing.      Narrative:      Per Hospital Policy: Only change Specimen Src: to \"Line\" if  specified by physician order.  Left AC    CoV-2, Flu A/B, And RSV by PCR [[id number]] Collected: 12/30/20 2025    Order Status: Completed Updated: 12/30/20 2111     Influenza virus A RNA Negative     Influenza virus B, PCR Negative     RSV, PCR Negative     SARS-CoV-2 by PCR NotDetected     Comment: PATIENTS: Important information regarding your results and instructions can  be found at [email or web address]   \"After your  Covid-19 Test\"  [facility] providers: PLEASE REFER TO DE-ESCALATION AND RETESTING PROTOCOL  on [email or web address]  **The [employer] GeneXpert Xpress SARS-CoV-2 Test has been made available for  use under the Emergency Use Authorization (EUA) only.          SARS-CoV-2 Source NP Swab    Narrative:      Is patient being admitted?->Yes  Does this patient meet criteria for Rush/Cepheid per [facility]  Inpatient Workflow? (See workflow link below)->Yes  Expected turn around time?->Rush (Cepheid 2-4 hours)  Have you been in close contact with a person who is suspected  or known to be positive for COVID-19 within the last 30 days  (e.g. last seen that person < 30 days ago)->Unknown    URINALYSIS [[id number]]  (Abnormal) Collected: 12/30/20 2040    Order Status: Completed Specimen: " Urine Updated: 12/30/20 2103     Color Yellow     Character Hazy     Specific Gravity 1.025     Ph 6.0     Glucose 500 mg/dL      Ketones Trace mg/dL      Protein Negative mg/dL      Bilirubin Negative     Nitrite Positive     Leukocyte Esterase Negative     Occult Blood Negative     Micro Urine Req Microscopic    Narrative:      Indication for culture:->Evaluation for sepsis without a  clear source of infection    COVID/SARS CoV-2 PCR [[id number]] Collected: 12/30/20 2025    Order Status: Completed Specimen: Respirate from Nasopharyngeal Updated: 12/30/20 2025     COVID Order Status Received     Comment: The order for SARS CoV-2 testing has been received by the  Laboratory. This result is neither positive nor negative.  Final results of testing will report in 24-48 hours, separately.         Narrative:      Is patient being admitted?->Yes  Does this patient meet criteria for Rush/Cepheid per [facility]  Inpatient Workflow? (See workflow link below)->Yes  Expected turn around time?->Rush (Cepheid 2-4 hours)  Have you been in close contact with a person who is suspected  or known to be positive for COVID-19 within the last 30 days  (e.g. last seen that person < 30 days ago)->Unknown          Plan:   Appropriate antibiotic therapy prescribed. No changes required based upon culture result.  Sent letter to patient to notify of positive culture result and encourage compliance with prescribed antibiotics.     Kentrell Garcia, JaniceD

## 2021-01-15 DIAGNOSIS — G43.709 CHRONIC MIGRAINE W/O AURA W/O STATUS MIGRAINOSUS, NOT INTRACTABLE: ICD-10-CM

## 2021-01-19 ENCOUNTER — OFFICE VISIT (OUTPATIENT)
Dept: NEUROLOGY | Facility: MEDICAL CENTER | Age: 56
End: 2021-01-19
Attending: NURSE PRACTITIONER
Payer: MEDICARE

## 2021-01-19 VITALS
HEIGHT: 64 IN | DIASTOLIC BLOOD PRESSURE: 72 MMHG | SYSTOLIC BLOOD PRESSURE: 122 MMHG | HEART RATE: 75 BPM | OXYGEN SATURATION: 98 % | BODY MASS INDEX: 35.38 KG/M2 | TEMPERATURE: 98.4 F | RESPIRATION RATE: 16 BRPM | WEIGHT: 207.23 LBS

## 2021-01-19 PROCEDURE — 64615 CHEMODENERV MUSC MIGRAINE: CPT | Performed by: NURSE PRACTITIONER

## 2021-01-19 PROCEDURE — 700111 HCHG RX REV CODE 636 W/ 250 OVERRIDE (IP): Mod: JG | Performed by: NURSE PRACTITIONER

## 2021-01-19 RX ADMIN — ONABOTULINUMTOXINA 155 UNITS: 200 INJECTION, POWDER, LYOPHILIZED, FOR SOLUTION INTRADERMAL; INTRAMUSCULAR at 10:06

## 2021-01-19 ASSESSMENT — FIBROSIS 4 INDEX: FIB4 SCORE: 0.88

## 2021-01-19 NOTE — PROGRESS NOTES
Subjective:      Donna Isaac is a 55 y.o. female who presents with Botox Injection (Chronic migraine)              HPI  Here for Botox #6 treatment today.     Migraines are improving with healing from the sinus surgery.  Currently using a gentamyacin wash as she is allergic to multiple oral antibiotics. Most recent surgery was performed at [facility].    Current Outpatient Medications   Medication Sig Dispense Refill   • spironolactone (ALDACTONE) 25 MG Tab Take 2 Tabs by mouth every day. 30 Tab 3   • amitriptyline (ELAVIL) 10 MG Tab TAKE 2 TABLETS BY MOUTH EVERY NIGHT AT BEDTIME, AND 1 TABLET BY MOUTH EVERY MORNING 90 Tab 5   • doxazosin (CARDURA) 2 MG Tab Take 1 Tab by mouth every day. 90 Tab 3   • furosemide (LASIX) 20 MG Tab Take 1 Tab by mouth every day. 30 Tab 11   • JARDIANCE 10 MG Tab Take 10 mg by mouth every day.     • fluticasone (FLONASE) 50 MCG/ACT nasal spray Spray 1 Spray in nose 2 times a day.     • Guaifenesin 400 MG Tab Take 400 mg by mouth every 8 hours as needed. Indications: Cough     • tramadol (ULTRAM) 50 MG Tab Take  mg by mouth every 6 hours as needed for Mild Pain.     • dexamethasone (DECADRON) 0.5 MG Tab Take 0.5 mg by mouth every bedtime.     • famotidine (PEPCID) 20 MG Tab Take 40 mg by mouth every bedtime. 2 tablets = 40 mg     • carvedilol (COREG) 25 MG Tab Take 25 mg by mouth 2 times a day, with meals.     • losartan (COZAAR) 100 MG Tab Take 1 Tab by mouth every day. 30 Tab 2   • DULoxetine (CYMBALTA) 60 MG Cap DR Particles delayed-release capsule Take 60 mg by mouth every evening.     • Frovatriptan Succinate (FROVA) 2.5 MG Tab Take 2.5 mg by mouth as needed (For migraines).     • montelukast (SINGULAIR) 10 MG Tab Take 10 mg by mouth every evening.     • pantoprazole (PROTONIX) 40 MG Tablet Delayed Response Take 40 mg by mouth every day.     • tizanidine (ZANAFLEX) 4 MG Tab Take 2-4 mg by mouth every 6 hours as needed. Indications: Muscle Spasticity     • calcitonin,  "salmon, (MIACALCIN) 200 UNIT/ACT Solution Spray 1 Spray in nose every bedtime.  12   • meloxicam (MOBIC) 15 MG tablet Take 15 mg by mouth every evening.     • colesevelam (WELCHOL) 625 MG Tab Take 625 mg by mouth 3 times a day, with meals.     • cevimeline (EVOXAC) 30 MG capsule Take 1 Cap by mouth 3 times a day. 270 Cap 0   • AIMOVIG 140 MG/ML Solution Auto-injector 140 mg by Injection route Q30 DAYS.     • estradiol (ESTRACE) 0.5 MG tablet Take 0.25 mg by mouth every day. 0.5 tablet = 0.25 mg     • SYNTHROID 75 MCG Tab Take 75 mcg by mouth Every morning on an empty stomach.     • gabapentin (NEURONTIN) 400 MG Cap Take 1 Cap by mouth 3 times a day. 180 Cap 3     Current Facility-Administered Medications   Medication Dose Route Frequency Provider Last Rate Last Admin   • onabotulinum toxin type A SOLR 155 Units  155 Units Injection Once Shayy Wynn A.PJelaniNJelani WALKER       Objective:     /72 (BP Location: Left arm, Patient Position: Sitting, BP Cuff Size: Adult)   Pulse 75   Temp 36.9 °C (98.4 °F) (Temporal)   Resp 16   Ht 1.626 m (5' 4.02\")   Wt 94 kg (207 lb 3.7 oz)   LMP 02/07/2016 (Within Months)   SpO2 98%   BMI 35.55 kg/m²      Physical Exam            Assessment/Plan:       Chronic Migraine:  Botox therapy has reduced patient’s migraines by more than 7 days and/or 100 hours per month.  Additionally pt has noted a reduction in the amount of medication they are taking to treat their migraines and/or they are having a reduction in intractable migraine/status migrainosis which can result in urgent care or ER visits.     Documentation of patient's symptoms with migraine are included in the initial note with this patient including the following:  Education/counseling/reduction in medications if pt has medication overuse headache;  Frequency of headaches is >15 days monthly with at least 8 migraines monthly;  Migraines include at least two of the following: worsened with activity or avoidance " of activity with migraines (ie they go lie down), moderate to severe pain intensity, pulsing headache, unilateral headache AND they have either nausea or vomiting OR sensitivity to light and sound     Although this patient is responding to botox they are NOT migraine free, however, and it is my recommendation botox be continued at this time     I treated this patient in clinic today with BotoxA 155 units according to the dosing/injection paradigm currently mandated by the FDA for the management of chronic migraine. Specifically, I injected 5 units to the procerus, 5 units to the corrugators bilaterally, a total of 20 units to the frontalis musculature, 20 units to the temporalis bilaterally, 15 units to the occipitalis bilaterally, 10 units to the cervical paraspinals bilaterally and 15 units to the trapezius musculature bilaterally. The remainder of the Botox 200 units mixed but not administered was discarded as wastage per FDA guidelines.      Return for follow-up in 12 weeks for 6th set of Botox.

## 2021-01-26 ENCOUNTER — APPOINTMENT (OUTPATIENT)
Dept: RADIOLOGY | Facility: MEDICAL CENTER | Age: 56
DRG: 493 | End: 2021-01-26
Attending: EMERGENCY MEDICINE
Payer: MEDICARE

## 2021-01-26 ENCOUNTER — APPOINTMENT (OUTPATIENT)
Dept: RADIOLOGY | Facility: MEDICAL CENTER | Age: 56
DRG: 493 | End: 2021-01-26
Attending: INTERNAL MEDICINE
Payer: MEDICARE

## 2021-01-26 ENCOUNTER — HOSPITAL ENCOUNTER (INPATIENT)
Facility: MEDICAL CENTER | Age: 56
LOS: 2 days | DRG: 493 | End: 2021-01-28
Attending: EMERGENCY MEDICINE | Admitting: INTERNAL MEDICINE
Payer: MEDICARE

## 2021-01-26 ENCOUNTER — APPOINTMENT (OUTPATIENT)
Dept: RADIOLOGY | Facility: MEDICAL CENTER | Age: 56
DRG: 493 | End: 2021-01-26
Attending: ORTHOPAEDIC SURGERY
Payer: MEDICARE

## 2021-01-26 DIAGNOSIS — E87.1 HYPONATREMIA: ICD-10-CM
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
## 2021-01-26 DIAGNOSIS — S82.891C TYPE III OPEN FRACTURE OF RIGHT ANKLE, INITIAL ENCOUNTER: ICD-10-CM

## 2021-01-26 DIAGNOSIS — N17.9 ACUTE KIDNEY INJURY (HCC): ICD-10-CM

## 2021-01-26 DIAGNOSIS — S82.891B TYPE I OR II OPEN FRACTURE OF RIGHT ANKLE, INITIAL ENCOUNTER: ICD-10-CM

## 2021-01-26 PROBLEM — Z79.52 CURRENT CHRONIC USE OF SYSTEMIC STEROIDS: Status: ACTIVE | Noted: 2021-01-26

## 2021-01-26 LAB
ABO + RH BLD: NORMAL
ABO GROUP BLD: NORMAL
ALBUMIN SERPL BCP-MCNC: 3.6 G/DL (ref 3.2–4.9)
ALBUMIN/GLOB SERPL: 1.6 G/DL
ALP SERPL-CCNC: 79 U/L (ref 30–99)
ALT SERPL-CCNC: 23 U/L (ref 2–50)
ANION GAP SERPL CALC-SCNC: 13 MMOL/L (ref 7–16)
APTT PPP: 23.4 SEC (ref 24.7–36)
AST SERPL-CCNC: 22 U/L (ref 12–45)
BILIRUB SERPL-MCNC: 0.2 MG/DL (ref 0.1–1.5)
BLD GP AB SCN SERPL QL: NORMAL
BUN SERPL-MCNC: 31 MG/DL (ref 8–22)
CALCIUM SERPL-MCNC: 8.4 MG/DL (ref 8.5–10.5)
CHLORIDE SERPL-SCNC: 95 MMOL/L (ref 96–112)
CO2 SERPL-SCNC: 20 MMOL/L (ref 20–33)
CREAT SERPL-MCNC: 2.5 MG/DL (ref 0.5–1.4)
EKG IMPRESSION: NORMAL
ERYTHROCYTE [DISTWIDTH] IN BLOOD BY AUTOMATED COUNT: 58.3 FL (ref 35.9–50)
ETHANOL BLD-MCNC: <10.1 MG/DL (ref 0–10)
GLOBULIN SER CALC-MCNC: 2.3 G/DL (ref 1.9–3.5)
GLUCOSE SERPL-MCNC: 101 MG/DL (ref 65–99)
HCG SERPL QL: NEGATIVE
HCT VFR BLD AUTO: 38.7 % (ref 37–47)
HGB BLD-MCNC: 12.2 G/DL (ref 12–16)
INR PPP: 0.97 (ref 0.87–1.13)
MCH RBC QN AUTO: 30.9 PG (ref 27–33)
MCHC RBC AUTO-ENTMCNC: 31.5 G/DL (ref 33.6–35)
MCV RBC AUTO: 98 FL (ref 81.4–97.8)
PLATELET # BLD AUTO: 262 K/UL (ref 164–446)
PMV BLD AUTO: 10.2 FL (ref 9–12.9)
POTASSIUM SERPL-SCNC: 4.3 MMOL/L (ref 3.6–5.5)
PROT SERPL-MCNC: 5.9 G/DL (ref 6–8.2)
PROTHROMBIN TIME: 13.2 SEC (ref 12–14.6)
RBC # BLD AUTO: 3.95 M/UL (ref 4.2–5.4)
RH BLD: NORMAL
SARS-COV+SARS-COV-2 AG RESP QL IA.RAPID: NOTDETECTED
SODIUM SERPL-SCNC: 128 MMOL/L (ref 135–145)
SPECIMEN SOURCE: NORMAL
WBC # BLD AUTO: 6.8 K/UL (ref 4.8–10.8)

## 2021-01-26 PROCEDURE — 700111 HCHG RX REV CODE 636 W/ 250 OVERRIDE (IP): Performed by: EMERGENCY MEDICINE

## 2021-01-26 PROCEDURE — 85027 COMPLETE CBC AUTOMATED: CPT

## 2021-01-26 PROCEDURE — 96365 THER/PROPH/DIAG IV INF INIT: CPT

## 2021-01-26 PROCEDURE — 99223 1ST HOSP IP/OBS HIGH 75: CPT | Mod: AI | Performed by: INTERNAL MEDICINE

## 2021-01-26 PROCEDURE — 85610 PROTHROMBIN TIME: CPT

## 2021-01-26 PROCEDURE — 73590 X-RAY EXAM OF LOWER LEG: CPT | Mod: LT

## 2021-01-26 PROCEDURE — 86850 RBC ANTIBODY SCREEN: CPT

## 2021-01-26 PROCEDURE — 82077 ASSAY SPEC XCP UR&BREATH IA: CPT

## 2021-01-26 PROCEDURE — 73610 X-RAY EXAM OF ANKLE: CPT | Mod: RT

## 2021-01-26 PROCEDURE — 700111 HCHG RX REV CODE 636 W/ 250 OVERRIDE (IP): Performed by: INTERNAL MEDICINE

## 2021-01-26 PROCEDURE — 96375 TX/PRO/DX INJ NEW DRUG ADDON: CPT

## 2021-01-26 PROCEDURE — 27840 TREAT ANKLE DISLOCATION: CPT

## 2021-01-26 PROCEDURE — 76775 US EXAM ABDO BACK WALL LIM: CPT

## 2021-01-26 PROCEDURE — A9270 NON-COVERED ITEM OR SERVICE: HCPCS | Performed by: INTERNAL MEDICINE

## 2021-01-26 PROCEDURE — 99285 EMERGENCY DEPT VISIT HI MDM: CPT

## 2021-01-26 PROCEDURE — 93005 ELECTROCARDIOGRAM TRACING: CPT | Performed by: EMERGENCY MEDICINE

## 2021-01-26 PROCEDURE — 302874 HCHG BANDAGE ACE 2 OR 3""

## 2021-01-26 PROCEDURE — 73610 X-RAY EXAM OF ANKLE: CPT | Mod: LT

## 2021-01-26 PROCEDURE — 86901 BLOOD TYPING SEROLOGIC RH(D): CPT

## 2021-01-26 PROCEDURE — 73590 X-RAY EXAM OF LOWER LEG: CPT | Mod: RT

## 2021-01-26 PROCEDURE — 700102 HCHG RX REV CODE 250 W/ 637 OVERRIDE(OP): Performed by: INTERNAL MEDICINE

## 2021-01-26 PROCEDURE — 700105 HCHG RX REV CODE 258: Performed by: EMERGENCY MEDICINE

## 2021-01-26 PROCEDURE — 80053 COMPREHEN METABOLIC PANEL: CPT

## 2021-01-26 PROCEDURE — 86900 BLOOD TYPING SEROLOGIC ABO: CPT

## 2021-01-26 PROCEDURE — 71045 X-RAY EXAM CHEST 1 VIEW: CPT

## 2021-01-26 PROCEDURE — 305948 HCHG GREEN TRAUMA ACT PRE-NOTIFY NO CC

## 2021-01-26 PROCEDURE — 73600 X-RAY EXAM OF ANKLE: CPT | Mod: RT

## 2021-01-26 PROCEDURE — 84703 CHORIONIC GONADOTROPIN ASSAY: CPT

## 2021-01-26 PROCEDURE — 29505 APPLICATION LONG LEG SPLINT: CPT

## 2021-01-26 PROCEDURE — 87426 SARSCOV CORONAVIRUS AG IA: CPT

## 2021-01-26 PROCEDURE — 770006 HCHG ROOM/CARE - MED/SURG/GYN SEMI*

## 2021-01-26 PROCEDURE — 700105 HCHG RX REV CODE 258: Performed by: INTERNAL MEDICINE

## 2021-01-26 PROCEDURE — 85730 THROMBOPLASTIN TIME PARTIAL: CPT

## 2021-01-26 PROCEDURE — 0QSJ35Z REPOSITION RIGHT FIBULA WITH EXTERNAL FIXATION DEVICE, PERCUTANEOUS APPROACH: ICD-10-PCS | Performed by: EMERGENCY MEDICINE

## 2021-01-26 RX ORDER — SODIUM CHLORIDE 9 MG/ML
INJECTION, SOLUTION INTRAVENOUS CONTINUOUS
Status: DISCONTINUED | OUTPATIENT
Start: 2021-01-26 | End: 2021-01-28 | Stop reason: HOSPADM

## 2021-01-26 RX ORDER — VITAMIN B COMPLEX
2000 TABLET ORAL DAILY
Status: DISCONTINUED | OUTPATIENT
Start: 2021-01-27 | End: 2021-01-28 | Stop reason: HOSPADM

## 2021-01-26 RX ORDER — AMILORIDE HYDROCHLORIDE 5 MG/1
5 TABLET ORAL EVERY MORNING
Status: ON HOLD | COMMUNITY
End: 2021-04-28

## 2021-01-26 RX ORDER — CARVEDILOL 6.25 MG/1
25 TABLET ORAL 2 TIMES DAILY WITH MEALS
Status: DISCONTINUED | OUTPATIENT
Start: 2021-01-26 | End: 2021-01-28 | Stop reason: HOSPADM

## 2021-01-26 RX ORDER — CALCITONIN SALMON 200 [IU]/.09ML
1 SPRAY, METERED NASAL
Status: DISCONTINUED | OUTPATIENT
Start: 2021-01-26 | End: 2021-01-26

## 2021-01-26 RX ORDER — AMOXICILLIN 250 MG
2 CAPSULE ORAL 2 TIMES DAILY
Status: DISCONTINUED | OUTPATIENT
Start: 2021-01-27 | End: 2021-01-28 | Stop reason: HOSPADM

## 2021-01-26 RX ORDER — SODIUM CHLORIDE, SODIUM LACTATE, POTASSIUM CHLORIDE, AND CALCIUM CHLORIDE .6; .31; .03; .02 G/100ML; G/100ML; G/100ML; G/100ML
INJECTION, SOLUTION INTRAVENOUS
Status: COMPLETED | OUTPATIENT
Start: 2021-01-26 | End: 2021-01-26

## 2021-01-26 RX ORDER — AMITRIPTYLINE HYDROCHLORIDE 10 MG/1
20 TABLET, FILM COATED ORAL
COMMUNITY
End: 2021-03-16

## 2021-01-26 RX ORDER — DOXAZOSIN 2 MG/1
2 TABLET ORAL
COMMUNITY
End: 2021-03-16

## 2021-01-26 RX ORDER — POTASSIUM CHLORIDE 20 MEQ/1
20 TABLET, EXTENDED RELEASE ORAL DAILY
COMMUNITY
End: 2021-01-26

## 2021-01-26 RX ORDER — ONDANSETRON 2 MG/ML
4 INJECTION INTRAMUSCULAR; INTRAVENOUS EVERY 4 HOURS PRN
Status: DISCONTINUED | OUTPATIENT
Start: 2021-01-26 | End: 2021-01-28 | Stop reason: HOSPADM

## 2021-01-26 RX ORDER — ONDANSETRON 4 MG/1
4 TABLET, ORALLY DISINTEGRATING ORAL EVERY 4 HOURS PRN
Status: DISCONTINUED | OUTPATIENT
Start: 2021-01-26 | End: 2021-01-28 | Stop reason: HOSPADM

## 2021-01-26 RX ORDER — COLESEVELAM 180 1/1
625 TABLET ORAL 3 TIMES DAILY
COMMUNITY
End: 2021-03-16

## 2021-01-26 RX ORDER — KETOROLAC TROMETHAMINE 30 MG/ML
15 INJECTION, SOLUTION INTRAMUSCULAR; INTRAVENOUS EVERY 6 HOURS PRN
Status: DISCONTINUED | OUTPATIENT
Start: 2021-01-26 | End: 2021-01-26

## 2021-01-26 RX ORDER — LEVOTHYROXINE SODIUM 0.07 MG/1
75 TABLET ORAL EVERY EVENING
COMMUNITY
End: 2021-06-22

## 2021-01-26 RX ORDER — HYDROXYZINE PAMOATE 25 MG/1
25 CAPSULE ORAL EVERY 6 HOURS PRN
COMMUNITY
End: 2021-05-10

## 2021-01-26 RX ORDER — MONTELUKAST SODIUM 10 MG/1
10 TABLET ORAL
Status: DISCONTINUED | OUTPATIENT
Start: 2021-01-26 | End: 2021-01-28 | Stop reason: HOSPADM

## 2021-01-26 RX ORDER — FUROSEMIDE 40 MG/1
20 TABLET ORAL 2 TIMES DAILY
Status: DISCONTINUED | OUTPATIENT
Start: 2021-01-26 | End: 2021-01-26

## 2021-01-26 RX ORDER — SODIUM CHLORIDE 9 MG/ML
INJECTION, SOLUTION INTRAVENOUS
Status: DISCONTINUED | OUTPATIENT
Start: 2021-01-26 | End: 2021-01-26

## 2021-01-26 RX ORDER — BISACODYL 10 MG
10 SUPPOSITORY, RECTAL RECTAL
Status: DISCONTINUED | OUTPATIENT
Start: 2021-01-26 | End: 2021-01-28 | Stop reason: HOSPADM

## 2021-01-26 RX ORDER — AMILORIDE HYDROCHLORIDE 5 MG/1
5 TABLET ORAL EVERY MORNING
Status: DISCONTINUED | OUTPATIENT
Start: 2021-01-27 | End: 2021-01-26

## 2021-01-26 RX ORDER — CEVIMELINE HYDROCHLORIDE 30 MG/1
30 CAPSULE ORAL
COMMUNITY
End: 2021-01-26

## 2021-01-26 RX ORDER — CARVEDILOL 25 MG/1
25 TABLET ORAL 2 TIMES DAILY WITH MEALS
Status: ON HOLD | COMMUNITY
End: 2021-05-15

## 2021-01-26 RX ORDER — CYANOCOBALAMIN 1000 UG/ML
1000 INJECTION, SOLUTION INTRAMUSCULAR; SUBCUTANEOUS
Status: DISCONTINUED | OUTPATIENT
Start: 2021-01-26 | End: 2021-01-26

## 2021-01-26 RX ORDER — CETIRIZINE HYDROCHLORIDE 10 MG/1
20 TABLET ORAL
COMMUNITY
End: 2021-06-22

## 2021-01-26 RX ORDER — TIZANIDINE 4 MG/1
8 TABLET ORAL EVERY 6 HOURS PRN
Status: ON HOLD | COMMUNITY
End: 2021-04-28

## 2021-01-26 RX ORDER — FAMOTIDINE 40 MG/1
40 TABLET, FILM COATED ORAL
Status: ON HOLD | COMMUNITY
End: 2021-04-28

## 2021-01-26 RX ORDER — DEXLANSOPRAZOLE 60 MG/1
60 CAPSULE, DELAYED RELEASE ORAL EVERY EVENING
Status: ON HOLD | COMMUNITY
End: 2021-07-09

## 2021-01-26 RX ORDER — CETIRIZINE HYDROCHLORIDE 10 MG/1
10 TABLET ORAL
Status: DISCONTINUED | OUTPATIENT
Start: 2021-01-26 | End: 2021-01-26

## 2021-01-26 RX ORDER — OMEPRAZOLE 20 MG/1
20 CAPSULE, DELAYED RELEASE ORAL DAILY
Status: DISCONTINUED | OUTPATIENT
Start: 2021-01-27 | End: 2021-01-28 | Stop reason: HOSPADM

## 2021-01-26 RX ORDER — DOXAZOSIN 2 MG/1
2 TABLET ORAL
Status: DISCONTINUED | OUTPATIENT
Start: 2021-01-27 | End: 2021-01-28 | Stop reason: HOSPADM

## 2021-01-26 RX ORDER — LEVOTHYROXINE SODIUM 0.07 MG/1
75 TABLET ORAL
Status: DISCONTINUED | OUTPATIENT
Start: 2021-01-27 | End: 2021-01-28 | Stop reason: HOSPADM

## 2021-01-26 RX ORDER — HEPARIN SODIUM 5000 [USP'U]/ML
5000 INJECTION, SOLUTION INTRAVENOUS; SUBCUTANEOUS EVERY 8 HOURS
Status: DISCONTINUED | OUTPATIENT
Start: 2021-01-27 | End: 2021-01-28 | Stop reason: HOSPADM

## 2021-01-26 RX ORDER — LIOTHYRONINE SODIUM 25 UG/1
25 TABLET ORAL DAILY
COMMUNITY

## 2021-01-26 RX ORDER — LOSARTAN POTASSIUM 100 MG/1
100 TABLET ORAL EVERY MORNING
Status: DISCONTINUED | OUTPATIENT
Start: 2021-01-27 | End: 2021-01-26

## 2021-01-26 RX ORDER — PANTOPRAZOLE SODIUM 40 MG/1
40 TABLET, DELAYED RELEASE ORAL DAILY
COMMUNITY
End: 2021-03-16

## 2021-01-26 RX ORDER — DULOXETIN HYDROCHLORIDE 60 MG/1
60 CAPSULE, DELAYED RELEASE ORAL
Status: DISCONTINUED | OUTPATIENT
Start: 2021-01-26 | End: 2021-01-28 | Stop reason: HOSPADM

## 2021-01-26 RX ORDER — MELOXICAM 15 MG/1
15 TABLET ORAL
COMMUNITY
End: 2021-03-16

## 2021-01-26 RX ORDER — AMITRIPTYLINE HYDROCHLORIDE 10 MG/1
20 TABLET, FILM COATED ORAL 2 TIMES DAILY
Status: DISCONTINUED | OUTPATIENT
Start: 2021-01-26 | End: 2021-01-26

## 2021-01-26 RX ORDER — AMITRIPTYLINE HYDROCHLORIDE 10 MG/1
20 TABLET, FILM COATED ORAL EVERY EVENING
Status: DISCONTINUED | OUTPATIENT
Start: 2021-01-27 | End: 2021-01-28 | Stop reason: HOSPADM

## 2021-01-26 RX ORDER — LIOTHYRONINE SODIUM 25 UG/1
25 TABLET ORAL
Status: DISCONTINUED | OUTPATIENT
Start: 2021-01-27 | End: 2021-01-28 | Stop reason: HOSPADM

## 2021-01-26 RX ORDER — MONTELUKAST SODIUM 10 MG/1
10 TABLET ORAL
COMMUNITY
End: 2021-03-16

## 2021-01-26 RX ORDER — FUROSEMIDE 20 MG/1
20 TABLET ORAL 2 TIMES DAILY
COMMUNITY
End: 2021-03-16

## 2021-01-26 RX ORDER — LOSARTAN POTASSIUM 100 MG/1
100 TABLET ORAL EVERY MORNING
COMMUNITY
End: 2021-03-16

## 2021-01-26 RX ORDER — GABAPENTIN 300 MG/1
300 CAPSULE ORAL
Status: DISCONTINUED | OUTPATIENT
Start: 2021-01-26 | End: 2021-01-28 | Stop reason: HOSPADM

## 2021-01-26 RX ORDER — DULOXETIN HYDROCHLORIDE 60 MG/1
60 CAPSULE, DELAYED RELEASE ORAL
COMMUNITY

## 2021-01-26 RX ORDER — ACETAMINOPHEN 160 MG
2000 TABLET,DISINTEGRATING ORAL DAILY
COMMUNITY
End: 2021-06-22

## 2021-01-26 RX ORDER — FROVATRIPTAN SUCCINATE 2.5 MG/1
2.5 TABLET, FILM COATED ORAL PRN
COMMUNITY
End: 2021-03-16

## 2021-01-26 RX ORDER — TIZANIDINE 4 MG/1
4 TABLET ORAL EVERY 6 HOURS PRN
Status: DISCONTINUED | OUTPATIENT
Start: 2021-01-26 | End: 2021-01-28 | Stop reason: HOSPADM

## 2021-01-26 RX ORDER — ESTRADIOL 0.5 MG/1
0.5 TABLET ORAL EVERY MORNING
Status: ON HOLD | COMMUNITY
End: 2021-07-09

## 2021-01-26 RX ORDER — FAMOTIDINE 20 MG/1
20 TABLET, FILM COATED ORAL
Status: DISCONTINUED | OUTPATIENT
Start: 2021-01-26 | End: 2021-01-28 | Stop reason: HOSPADM

## 2021-01-26 RX ORDER — DEXAMETHASONE 0.5 MG/1
0.5 TABLET ORAL
COMMUNITY
End: 2021-03-16

## 2021-01-26 RX ORDER — SOMATROPIN 10 MG
0.3 KIT SUBCUTANEOUS EVERY EVENING
COMMUNITY
End: 2021-08-13

## 2021-01-26 RX ORDER — POLYETHYLENE GLYCOL 3350 17 G/17G
1 POWDER, FOR SOLUTION ORAL
Status: DISCONTINUED | OUTPATIENT
Start: 2021-01-26 | End: 2021-01-28 | Stop reason: HOSPADM

## 2021-01-26 RX ORDER — CALCITONIN SALMON 200 [IU]/.09ML
1 SPRAY, METERED NASAL
COMMUNITY
End: 2021-03-16

## 2021-01-26 RX ORDER — CYANOCOBALAMIN 1000 UG/ML
1000 INJECTION, SOLUTION INTRAMUSCULAR; SUBCUTANEOUS
Status: ON HOLD | COMMUNITY
End: 2021-07-09

## 2021-01-26 RX ORDER — GABAPENTIN 400 MG/1
400 CAPSULE ORAL 3 TIMES DAILY
COMMUNITY
End: 2021-03-16

## 2021-01-26 RX ORDER — PROMETHAZINE HYDROCHLORIDE 25 MG/1
12.5-25 SUPPOSITORY RECTAL EVERY 4 HOURS PRN
Status: DISCONTINUED | OUTPATIENT
Start: 2021-01-26 | End: 2021-01-28 | Stop reason: HOSPADM

## 2021-01-26 RX ORDER — CALCIUM CARBONATE 300MG(750)
400 TABLET,CHEWABLE ORAL DAILY
Status: ON HOLD | COMMUNITY
End: 2021-07-09

## 2021-01-26 RX ORDER — DEXAMETHASONE 1 MG
0.5 TABLET ORAL
Status: DISCONTINUED | OUTPATIENT
Start: 2021-01-26 | End: 2021-01-26

## 2021-01-26 RX ADMIN — FENTANYL CITRATE 50 MCG: 50 INJECTION, SOLUTION INTRAMUSCULAR; INTRAVENOUS at 23:49

## 2021-01-26 RX ADMIN — FENTANYL CITRATE 50 MCG: 50 INJECTION, SOLUTION INTRAMUSCULAR; INTRAVENOUS at 19:32

## 2021-01-26 RX ADMIN — AMITRIPTYLINE HYDROCHLORIDE 20 MG: 10 TABLET, FILM COATED ORAL at 23:40

## 2021-01-26 RX ADMIN — GENTAMICIN SULFATE 480 MG: 40 INJECTION, SOLUTION INTRAMUSCULAR; INTRAVENOUS at 17:47

## 2021-01-26 RX ADMIN — GABAPENTIN 300 MG: 300 CAPSULE ORAL at 23:40

## 2021-01-26 RX ADMIN — FAMOTIDINE 20 MG: 20 TABLET ORAL at 23:40

## 2021-01-26 RX ADMIN — SODIUM CHLORIDE: 9 INJECTION, SOLUTION INTRAVENOUS at 23:41

## 2021-01-26 RX ADMIN — MONTELUKAST 10 MG: 10 TABLET, FILM COATED ORAL at 23:40

## 2021-01-26 RX ADMIN — CARVEDILOL 25 MG: 25 TABLET, FILM COATED ORAL at 23:40

## 2021-01-26 RX ADMIN — SODIUM CHLORIDE, POTASSIUM CHLORIDE, SODIUM LACTATE AND CALCIUM CHLORIDE 1000 ML: 600; 310; 30; 20 INJECTION, SOLUTION INTRAVENOUS at 17:14

## 2021-01-26 RX ADMIN — HYDROCORTISONE SODIUM SUCCINATE 25 MG: 100 INJECTION, POWDER, FOR SOLUTION INTRAMUSCULAR; INTRAVENOUS at 23:41

## 2021-01-26 RX ADMIN — DULOXETINE HYDROCHLORIDE 60 MG: 60 CAPSULE, DELAYED RELEASE ORAL at 22:45

## 2021-01-26 SDOH — HEALTH STABILITY: MENTAL HEALTH: HOW OFTEN DO YOU HAVE A DRINK CONTAINING ALCOHOL?: NEVER

## 2021-01-26 SDOH — ECONOMIC STABILITY: FOOD INSECURITY: WITHIN THE PAST 12 MONTHS, THE FOOD YOU BOUGHT JUST DIDN'T LAST AND YOU DIDN'T HAVE MONEY TO GET MORE.: NEVER TRUE

## 2021-01-26 SDOH — ECONOMIC STABILITY: FOOD INSECURITY: WITHIN THE PAST 12 MONTHS, YOU WORRIED THAT YOUR FOOD WOULD RUN OUT BEFORE YOU GOT MONEY TO BUY MORE.: NEVER TRUE

## 2021-01-26 ASSESSMENT — ENCOUNTER SYMPTOMS
NECK PAIN: 0
FEVER: 0
BRUISES/BLEEDS EASILY: 0
NERVOUS/ANXIOUS: 0
FLANK PAIN: 0
SPUTUM PRODUCTION: 0
FOCAL WEAKNESS: 0
PHOTOPHOBIA: 0
PALPITATIONS: 0
SPEECH CHANGE: 0
HALLUCINATIONS: 0
CHILLS: 0
FALLS: 1
TREMORS: 0
DOUBLE VISION: 0
NAUSEA: 0
HEMOPTYSIS: 0
WEIGHT LOSS: 0
POLYDIPSIA: 0
VOMITING: 0
BLURRED VISION: 0
BACK PAIN: 0
COUGH: 0
HEARTBURN: 0
HEADACHES: 0
ORTHOPNEA: 0

## 2021-01-26 ASSESSMENT — LIFESTYLE VARIABLES
EVER FELT BAD OR GUILTY ABOUT YOUR DRINKING: NO
TOTAL SCORE: 0
EVER HAD A DRINK FIRST THING IN THE MORNING TO STEADY YOUR NERVES TO GET RID OF A HANGOVER: NO
TOTAL SCORE: 0
DOES PATIENT WANT TO STOP DRINKING: NO
HAVE YOU EVER FELT YOU SHOULD CUT DOWN ON YOUR DRINKING: NO
HAVE PEOPLE ANNOYED YOU BY CRITICIZING YOUR DRINKING: NO
ALCOHOL_USE: NO
SUBSTANCE_ABUSE: 0
TOTAL SCORE: 0
CONSUMPTION TOTAL: INCOMPLETE

## 2021-01-26 ASSESSMENT — FIBROSIS 4 INDEX: FIB4 SCORE: 0.98

## 2021-01-26 ASSESSMENT — PAIN DESCRIPTION - PAIN TYPE
TYPE: ACUTE PAIN
TYPE: ACUTE PAIN

## 2021-01-27 ENCOUNTER — ANESTHESIA (OUTPATIENT)
Dept: SURGERY | Facility: MEDICAL CENTER | Age: 56
DRG: 493 | End: 2021-01-27
Payer: MEDICARE

## 2021-01-27 ENCOUNTER — APPOINTMENT (OUTPATIENT)
Dept: RADIOLOGY | Facility: MEDICAL CENTER | Age: 56
DRG: 493 | End: 2021-01-27
Attending: ORTHOPAEDIC SURGERY
Payer: MEDICARE

## 2021-01-27 ENCOUNTER — ANESTHESIA EVENT (OUTPATIENT)
Dept: SURGERY | Facility: MEDICAL CENTER | Age: 56
DRG: 493 | End: 2021-01-27
Payer: MEDICARE

## 2021-01-27 LAB
ALBUMIN SERPL BCP-MCNC: 3.8 G/DL (ref 3.2–4.9)
ALBUMIN/GLOB SERPL: 1.6 G/DL
ALP SERPL-CCNC: 92 U/L (ref 30–99)
ALT SERPL-CCNC: 24 U/L (ref 2–50)
ANION GAP SERPL CALC-SCNC: 12 MMOL/L (ref 7–16)
AST SERPL-CCNC: 19 U/L (ref 12–45)
BASOPHILS # BLD AUTO: 0.4 % (ref 0–1.8)
BASOPHILS # BLD: 0.03 K/UL (ref 0–0.12)
BILIRUB SERPL-MCNC: 0.4 MG/DL (ref 0.1–1.5)
BUN SERPL-MCNC: 26 MG/DL (ref 8–22)
CALCIUM SERPL-MCNC: 8.8 MG/DL (ref 8.5–10.5)
CHLORIDE SERPL-SCNC: 100 MMOL/L (ref 96–112)
CK SERPL-CCNC: 53 U/L (ref 0–154)
CO2 SERPL-SCNC: 21 MMOL/L (ref 20–33)
CORTIS SERPL-MCNC: 16 UG/DL (ref 0–23)
CREAT SERPL-MCNC: 1.76 MG/DL (ref 0.5–1.4)
EOSINOPHIL # BLD AUTO: 0.04 K/UL (ref 0–0.51)
EOSINOPHIL NFR BLD: 0.5 % (ref 0–6.9)
ERYTHROCYTE [DISTWIDTH] IN BLOOD BY AUTOMATED COUNT: 58.8 FL (ref 35.9–50)
GLOBULIN SER CALC-MCNC: 2.4 G/DL (ref 1.9–3.5)
GLUCOSE SERPL-MCNC: 112 MG/DL (ref 65–99)
HCT VFR BLD AUTO: 41.4 % (ref 37–47)
HGB BLD-MCNC: 13.2 G/DL (ref 12–16)
IMM GRANULOCYTES # BLD AUTO: 0.04 K/UL (ref 0–0.11)
IMM GRANULOCYTES NFR BLD AUTO: 0.5 % (ref 0–0.9)
INR PPP: 0.92 (ref 0.87–1.13)
LYMPHOCYTES # BLD AUTO: 1.19 K/UL (ref 1–4.8)
LYMPHOCYTES NFR BLD: 14.9 % (ref 22–41)
MCH RBC QN AUTO: 31.1 PG (ref 27–33)
MCHC RBC AUTO-ENTMCNC: 31.9 G/DL (ref 33.6–35)
MCV RBC AUTO: 97.6 FL (ref 81.4–97.8)
MONOCYTES # BLD AUTO: 0.82 K/UL (ref 0–0.85)
MONOCYTES NFR BLD AUTO: 10.3 % (ref 0–13.4)
NEUTROPHILS # BLD AUTO: 5.85 K/UL (ref 2–7.15)
NEUTROPHILS NFR BLD: 73.4 % (ref 44–72)
NRBC # BLD AUTO: 0 K/UL
NRBC BLD-RTO: 0 /100 WBC
PLATELET # BLD AUTO: 299 K/UL (ref 164–446)
PMV BLD AUTO: 10 FL (ref 9–12.9)
POTASSIUM SERPL-SCNC: 4.4 MMOL/L (ref 3.6–5.5)
PROT SERPL-MCNC: 6.2 G/DL (ref 6–8.2)
PROTHROMBIN TIME: 12.7 SEC (ref 12–14.6)
RBC # BLD AUTO: 4.24 M/UL (ref 4.2–5.4)
SODIUM SERPL-SCNC: 133 MMOL/L (ref 135–145)
TSH SERPL DL<=0.005 MIU/L-ACNC: 0.99 UIU/ML (ref 0.38–5.33)
WBC # BLD AUTO: 8 K/UL (ref 4.8–10.8)

## 2021-01-27 PROCEDURE — 700105 HCHG RX REV CODE 258: Performed by: ANESTHESIOLOGY

## 2021-01-27 PROCEDURE — 700111 HCHG RX REV CODE 636 W/ 250 OVERRIDE (IP): Performed by: ANESTHESIOLOGY

## 2021-01-27 PROCEDURE — A9270 NON-COVERED ITEM OR SERVICE: HCPCS | Performed by: INTERNAL MEDICINE

## 2021-01-27 PROCEDURE — 3E0T3BZ INTRODUCTION OF ANESTHETIC AGENT INTO PERIPHERAL NERVES AND PLEXI, PERCUTANEOUS APPROACH: ICD-10-PCS | Performed by: ANESTHESIOLOGY

## 2021-01-27 PROCEDURE — A9270 NON-COVERED ITEM OR SERVICE: HCPCS | Performed by: HOSPITALIST

## 2021-01-27 PROCEDURE — 99233 SBSQ HOSP IP/OBS HIGH 50: CPT | Performed by: HOSPITALIST

## 2021-01-27 PROCEDURE — 82550 ASSAY OF CK (CPK): CPT

## 2021-01-27 PROCEDURE — A6454 SELF-ADHER BAND W>=3" <5"/YD: HCPCS | Performed by: ORTHOPAEDIC SURGERY

## 2021-01-27 PROCEDURE — 501838 HCHG SUTURE GENERAL: Performed by: ORTHOPAEDIC SURGERY

## 2021-01-27 PROCEDURE — 97535 SELF CARE MNGMENT TRAINING: CPT

## 2021-01-27 PROCEDURE — 700111 HCHG RX REV CODE 636 W/ 250 OVERRIDE (IP): Performed by: INTERNAL MEDICINE

## 2021-01-27 PROCEDURE — 160048 HCHG OR STATISTICAL LEVEL 1-5: Performed by: ORTHOPAEDIC SURGERY

## 2021-01-27 PROCEDURE — 0QSJ04Z REPOSITION RIGHT FIBULA WITH INTERNAL FIXATION DEVICE, OPEN APPROACH: ICD-10-PCS | Performed by: ORTHOPAEDIC SURGERY

## 2021-01-27 PROCEDURE — 85025 COMPLETE CBC W/AUTO DIFF WBC: CPT

## 2021-01-27 PROCEDURE — 97166 OT EVAL MOD COMPLEX 45 MIN: CPT

## 2021-01-27 PROCEDURE — 700102 HCHG RX REV CODE 250 W/ 637 OVERRIDE(OP): Performed by: HOSPITALIST

## 2021-01-27 PROCEDURE — 82533 TOTAL CORTISOL: CPT

## 2021-01-27 PROCEDURE — 700102 HCHG RX REV CODE 250 W/ 637 OVERRIDE(OP): Performed by: INTERNAL MEDICINE

## 2021-01-27 PROCEDURE — 500054 HCHG BANDAGE, ELASTIC 6: Performed by: ORTHOPAEDIC SURGERY

## 2021-01-27 PROCEDURE — 503036 HCHG GUIDE PIN,OIC: Performed by: ORTHOPAEDIC SURGERY

## 2021-01-27 PROCEDURE — 160041 HCHG SURGERY MINUTES - EA ADDL 1 MIN LEVEL 4: Performed by: ORTHOPAEDIC SURGERY

## 2021-01-27 PROCEDURE — 73600 X-RAY EXAM OF ANKLE: CPT | Mod: RT

## 2021-01-27 PROCEDURE — 80053 COMPREHEN METABOLIC PANEL: CPT

## 2021-01-27 PROCEDURE — C1713 ANCHOR/SCREW BN/BN,TIS/BN: HCPCS | Performed by: ORTHOPAEDIC SURGERY

## 2021-01-27 PROCEDURE — 500881 HCHG PACK, EXTREMITY: Performed by: ORTHOPAEDIC SURGERY

## 2021-01-27 PROCEDURE — 160002 HCHG RECOVERY MINUTES (STAT): Performed by: ORTHOPAEDIC SURGERY

## 2021-01-27 PROCEDURE — 700101 HCHG RX REV CODE 250: Performed by: ORTHOPAEDIC SURGERY

## 2021-01-27 PROCEDURE — 84443 ASSAY THYROID STIM HORMONE: CPT

## 2021-01-27 PROCEDURE — 160035 HCHG PACU - 1ST 60 MINS PHASE I: Performed by: ORTHOPAEDIC SURGERY

## 2021-01-27 PROCEDURE — 36415 COLL VENOUS BLD VENIPUNCTURE: CPT

## 2021-01-27 PROCEDURE — 160009 HCHG ANES TIME/MIN: Performed by: ORTHOPAEDIC SURGERY

## 2021-01-27 PROCEDURE — 85610 PROTHROMBIN TIME: CPT

## 2021-01-27 PROCEDURE — 64445 NJX AA&/STRD SCIATIC NRV IMG: CPT | Performed by: ORTHOPAEDIC SURGERY

## 2021-01-27 PROCEDURE — 770006 HCHG ROOM/CARE - MED/SURG/GYN SEMI*

## 2021-01-27 PROCEDURE — 64447 NJX AA&/STRD FEMORAL NRV IMG: CPT | Performed by: ORTHOPAEDIC SURGERY

## 2021-01-27 PROCEDURE — 160029 HCHG SURGERY MINUTES - 1ST 30 MINS LEVEL 4: Performed by: ORTHOPAEDIC SURGERY

## 2021-01-27 PROCEDURE — 160036 HCHG PACU - EA ADDL 30 MINS PHASE I: Performed by: ORTHOPAEDIC SURGERY

## 2021-01-27 PROCEDURE — 97162 PT EVAL MOD COMPLEX 30 MIN: CPT

## 2021-01-27 DEVICE — SCREW 3.5 MM NON-LOCKING TI X 12MM LONG (6TX8+2TX5=58): Type: IMPLANTABLE DEVICE | Site: ANKLE | Status: FUNCTIONAL

## 2021-01-27 DEVICE — SCREW 3.5 MM NON-LOCKING TI X 40MM LONG (6TX8=48): Type: IMPLANTABLE DEVICE | Site: ANKLE | Status: FUNCTIONAL

## 2021-01-27 DEVICE — PLATE DISTAL FIBULA 5H (2TX2+2TX1=6): Type: IMPLANTABLE DEVICE | Site: ANKLE | Status: FUNCTIONAL

## 2021-01-27 DEVICE — SCREW 2.5 MM NON-LOCKING TI X 26MM LONG (6TX8=48): Type: IMPLANTABLE DEVICE | Site: ANKLE | Status: FUNCTIONAL

## 2021-01-27 DEVICE — SCREW 2.5 MM LOCKING TI X 12MM LONG (6TX8=48): Type: IMPLANTABLE DEVICE | Site: ANKLE | Status: FUNCTIONAL

## 2021-01-27 DEVICE — SCREW 3.5 MM LOCKING TI X 12MM LONG (6TX8+2TX5=58): Type: IMPLANTABLE DEVICE | Site: ANKLE | Status: FUNCTIONAL

## 2021-01-27 DEVICE — SCREW CANN 4.0X40 SHORT OIC (3TX3+2TX2=13): Type: IMPLANTABLE DEVICE | Site: ANKLE | Status: FUNCTIONAL

## 2021-01-27 RX ORDER — DEXAMETHASONE SODIUM PHOSPHATE 4 MG/ML
INJECTION, SOLUTION INTRA-ARTICULAR; INTRALESIONAL; INTRAMUSCULAR; INTRAVENOUS; SOFT TISSUE PRN
Status: DISCONTINUED | OUTPATIENT
Start: 2021-01-27 | End: 2021-01-27 | Stop reason: SURG

## 2021-01-27 RX ORDER — ONDANSETRON 2 MG/ML
INJECTION INTRAMUSCULAR; INTRAVENOUS PRN
Status: DISCONTINUED | OUTPATIENT
Start: 2021-01-27 | End: 2021-01-27 | Stop reason: SURG

## 2021-01-27 RX ORDER — MAGNESIUM HYDROXIDE 1200 MG/15ML
LIQUID ORAL
Status: COMPLETED | OUTPATIENT
Start: 2021-01-27 | End: 2021-01-27

## 2021-01-27 RX ORDER — DIPHENHYDRAMINE HYDROCHLORIDE 50 MG/ML
INJECTION INTRAMUSCULAR; INTRAVENOUS PRN
Status: DISCONTINUED | OUTPATIENT
Start: 2021-01-27 | End: 2021-01-27 | Stop reason: SURG

## 2021-01-27 RX ORDER — ACETAMINOPHEN 325 MG/1
650 TABLET ORAL EVERY 4 HOURS PRN
Status: DISCONTINUED | OUTPATIENT
Start: 2021-01-27 | End: 2021-01-27 | Stop reason: HOSPADM

## 2021-01-27 RX ORDER — SODIUM CHLORIDE, SODIUM LACTATE, POTASSIUM CHLORIDE, CALCIUM CHLORIDE 600; 310; 30; 20 MG/100ML; MG/100ML; MG/100ML; MG/100ML
INJECTION, SOLUTION INTRAVENOUS CONTINUOUS
Status: DISCONTINUED | OUTPATIENT
Start: 2021-01-27 | End: 2021-01-27 | Stop reason: HOSPADM

## 2021-01-27 RX ORDER — DIPHENHYDRAMINE HYDROCHLORIDE 50 MG/ML
12.5 INJECTION INTRAMUSCULAR; INTRAVENOUS
Status: DISCONTINUED | OUTPATIENT
Start: 2021-01-27 | End: 2021-01-27 | Stop reason: HOSPADM

## 2021-01-27 RX ORDER — SODIUM CHLORIDE, SODIUM LACTATE, POTASSIUM CHLORIDE, CALCIUM CHLORIDE 600; 310; 30; 20 MG/100ML; MG/100ML; MG/100ML; MG/100ML
INJECTION, SOLUTION INTRAVENOUS
Status: DISCONTINUED | OUTPATIENT
Start: 2021-01-27 | End: 2021-01-27 | Stop reason: SURG

## 2021-01-27 RX ORDER — HYDRALAZINE HYDROCHLORIDE 50 MG/1
25 TABLET, FILM COATED ORAL EVERY 6 HOURS PRN
Status: DISCONTINUED | OUTPATIENT
Start: 2021-01-27 | End: 2021-01-28 | Stop reason: HOSPADM

## 2021-01-27 RX ORDER — ROPIVACAINE HYDROCHLORIDE 5 MG/ML
INJECTION, SOLUTION EPIDURAL; INFILTRATION; PERINEURAL
Status: COMPLETED | OUTPATIENT
Start: 2021-01-27 | End: 2021-01-27

## 2021-01-27 RX ORDER — ONDANSETRON 2 MG/ML
4 INJECTION INTRAMUSCULAR; INTRAVENOUS
Status: DISCONTINUED | OUTPATIENT
Start: 2021-01-27 | End: 2021-01-27 | Stop reason: HOSPADM

## 2021-01-27 RX ADMIN — HYDRALAZINE HYDROCHLORIDE 25 MG: 50 TABLET, FILM COATED ORAL at 14:27

## 2021-01-27 RX ADMIN — GABAPENTIN 300 MG: 300 CAPSULE ORAL at 21:03

## 2021-01-27 RX ADMIN — FENTANYL CITRATE 50 MCG: 50 INJECTION, SOLUTION INTRAMUSCULAR; INTRAVENOUS at 13:07

## 2021-01-27 RX ADMIN — DIPHENHYDRAMINE HYDROCHLORIDE 25 MG: 50 INJECTION, SOLUTION INTRAMUSCULAR; INTRAVENOUS at 08:26

## 2021-01-27 RX ADMIN — TIZANIDINE 4 MG: 4 TABLET ORAL at 23:21

## 2021-01-27 RX ADMIN — AMITRIPTYLINE HYDROCHLORIDE 20 MG: 10 TABLET, FILM COATED ORAL at 17:52

## 2021-01-27 RX ADMIN — FENTANYL CITRATE 50 MCG: 50 INJECTION, SOLUTION INTRAMUSCULAR; INTRAVENOUS at 15:52

## 2021-01-27 RX ADMIN — ONDANSETRON 4 MG: 2 INJECTION INTRAMUSCULAR; INTRAVENOUS at 09:09

## 2021-01-27 RX ADMIN — LIOTHYRONINE SODIUM 25 MCG: 25 TABLET ORAL at 21:04

## 2021-01-27 RX ADMIN — HEPARIN SODIUM 5000 UNITS: 5000 INJECTION, SOLUTION INTRAVENOUS; SUBCUTANEOUS at 21:06

## 2021-01-27 RX ADMIN — DOXAZOSIN 2 MG: 2 TABLET ORAL at 21:03

## 2021-01-27 RX ADMIN — OMEPRAZOLE 20 MG: 20 CAPSULE, DELAYED RELEASE ORAL at 06:16

## 2021-01-27 RX ADMIN — HYDROCORTISONE SODIUM SUCCINATE 25 MG: 100 INJECTION, POWDER, FOR SOLUTION INTRAMUSCULAR; INTRAVENOUS at 06:16

## 2021-01-27 RX ADMIN — SODIUM CHLORIDE, POTASSIUM CHLORIDE, SODIUM LACTATE AND CALCIUM CHLORIDE: 600; 310; 30; 20 INJECTION, SOLUTION INTRAVENOUS at 08:05

## 2021-01-27 RX ADMIN — ROPIVACAINE HYDROCHLORIDE 40 ML: 5 INJECTION, SOLUTION EPIDURAL; INFILTRATION; PERINEURAL at 08:10

## 2021-01-27 RX ADMIN — MONTELUKAST 10 MG: 10 TABLET, FILM COATED ORAL at 21:03

## 2021-01-27 RX ADMIN — FAMOTIDINE 20 MG: 20 TABLET ORAL at 21:02

## 2021-01-27 RX ADMIN — TIZANIDINE 4 MG: 4 TABLET ORAL at 12:16

## 2021-01-27 RX ADMIN — CARVEDILOL 25 MG: 25 TABLET, FILM COATED ORAL at 12:16

## 2021-01-27 RX ADMIN — LEVOTHYROXINE SODIUM 75 MCG: 0.07 TABLET ORAL at 06:16

## 2021-01-27 RX ADMIN — VANCOMYCIN HYDROCHLORIDE 1 G: 1 INJECTION, POWDER, LYOPHILIZED, FOR SOLUTION INTRAVENOUS at 08:30

## 2021-01-27 RX ADMIN — FENTANYL CITRATE 50 MCG: 50 INJECTION, SOLUTION INTRAMUSCULAR; INTRAVENOUS at 04:06

## 2021-01-27 RX ADMIN — HYDROCORTISONE SODIUM SUCCINATE 25 MG: 100 INJECTION, POWDER, FOR SOLUTION INTRAMUSCULAR; INTRAVENOUS at 21:04

## 2021-01-27 RX ADMIN — DULOXETINE HYDROCHLORIDE 60 MG: 60 CAPSULE, DELAYED RELEASE ORAL at 21:00

## 2021-01-27 RX ADMIN — PROPOFOL 150 MG: 10 INJECTION, EMULSION INTRAVENOUS at 08:15

## 2021-01-27 RX ADMIN — DEXAMETHASONE SODIUM PHOSPHATE 4 MG: 4 INJECTION, SOLUTION INTRA-ARTICULAR; INTRALESIONAL; INTRAMUSCULAR; INTRAVENOUS; SOFT TISSUE at 08:30

## 2021-01-27 RX ADMIN — HYDROCORTISONE SODIUM SUCCINATE 25 MG: 100 INJECTION, POWDER, FOR SOLUTION INTRAMUSCULAR; INTRAVENOUS at 14:28

## 2021-01-27 RX ADMIN — CARVEDILOL 25 MG: 25 TABLET, FILM COATED ORAL at 17:52

## 2021-01-27 RX ADMIN — TIZANIDINE 4 MG: 4 TABLET ORAL at 02:47

## 2021-01-27 RX ADMIN — FENTANYL CITRATE 100 MCG: 50 INJECTION, SOLUTION INTRAMUSCULAR; INTRAVENOUS at 08:07

## 2021-01-27 ASSESSMENT — LIFESTYLE VARIABLES
TOTAL SCORE: 0
EVER FELT BAD OR GUILTY ABOUT YOUR DRINKING: NO
AVERAGE NUMBER OF DAYS PER WEEK YOU HAVE A DRINK CONTAINING ALCOHOL: 0
HAVE YOU EVER FELT YOU SHOULD CUT DOWN ON YOUR DRINKING: NO
ALCOHOL_USE: NO
HAVE PEOPLE ANNOYED YOU BY CRITICIZING YOUR DRINKING: NO
HOW MANY TIMES IN THE PAST YEAR HAVE YOU HAD 5 OR MORE DRINKS IN A DAY: 0
TOTAL SCORE: 0
ON A TYPICAL DAY WHEN YOU DRINK ALCOHOL HOW MANY DRINKS DO YOU HAVE: 0
TOTAL SCORE: 0
EVER HAD A DRINK FIRST THING IN THE MORNING TO STEADY YOUR NERVES TO GET RID OF A HANGOVER: NO
CONSUMPTION TOTAL: NEGATIVE
DOES PATIENT WANT TO STOP DRINKING: NO

## 2021-01-27 ASSESSMENT — COGNITIVE AND FUNCTIONAL STATUS - GENERAL
MOBILITY SCORE: 18
MOVING FROM LYING ON BACK TO SITTING ON SIDE OF FLAT BED: A LITTLE
TOILETING: A LITTLE
DRESSING REGULAR LOWER BODY CLOTHING: A LITTLE
WALKING IN HOSPITAL ROOM: A LOT
TURNING FROM BACK TO SIDE WHILE IN FLAT BAD: A LITTLE
HELP NEEDED FOR BATHING: A LITTLE
DRESSING REGULAR UPPER BODY CLOTHING: A LOT
TOILETING: A LITTLE
HELP NEEDED FOR BATHING: A LITTLE
SUGGESTED CMS G CODE MODIFIER MOBILITY: CK
TURNING FROM BACK TO SIDE WHILE IN FLAT BAD: A LITTLE
CLIMB 3 TO 5 STEPS WITH RAILING: TOTAL
MOVING TO AND FROM BED TO CHAIR: A LITTLE
DRESSING REGULAR LOWER BODY CLOTHING: A LITTLE
MOVING TO AND FROM BED TO CHAIR: A LITTLE
DAILY ACTIVITIY SCORE: 21
SUGGESTED CMS G CODE MODIFIER DAILY ACTIVITY: CK
WALKING IN HOSPITAL ROOM: A LITTLE
STANDING UP FROM CHAIR USING ARMS: A LITTLE
DAILY ACTIVITIY SCORE: 19
STANDING UP FROM CHAIR USING ARMS: A LOT
SUGGESTED CMS G CODE MODIFIER DAILY ACTIVITY: CJ
SUGGESTED CMS G CODE MODIFIER MOBILITY: CL
MOVING FROM LYING ON BACK TO SITTING ON SIDE OF FLAT BED: A LITTLE
CLIMB 3 TO 5 STEPS WITH RAILING: A LITTLE
MOBILITY SCORE: 14

## 2021-01-27 ASSESSMENT — PAIN DESCRIPTION - PAIN TYPE
TYPE: SURGICAL PAIN
TYPE: ACUTE PAIN
TYPE: ACUTE PAIN;SURGICAL PAIN

## 2021-01-27 ASSESSMENT — GAIT ASSESSMENTS: GAIT LEVEL OF ASSIST: UNABLE TO PARTICIPATE

## 2021-01-27 ASSESSMENT — ACTIVITIES OF DAILY LIVING (ADL): TOILETING: INDEPENDENT

## 2021-01-27 NOTE — ED PROVIDER NOTES
ED Provider Note    CHIEF COMPLAINT  No chief complaint on file.      HPI    Primary care provider: Not on file  Means of arrival: EMS  History obtained from: Patient, medics  History limited by: Nothing    Louisa Jacque/Donna Isaac is a 55 y.o. female who presents with right ankle pain and deformity.  The patient got lightheaded upon standing up and then slipped and fell at home.  She twisted her right foot and ankle and had an obvious noted medial open fracture to the right ankle.  Paramedics were called and they transported her here directly, 200 mcg of fentanyl given in route.  Did not hit her head or lose consciousness.  She gets lightheaded often.  Denies any chest pain or dyspnea.  No neck or back pain or abdominal pain.  No vomiting today or other recent illness.  Extensive medical history with numerous home medications.  Extensive allergy list as well.  The patient is complaining of moderate to severe nonradiating pain to her right ankle.  No other sites of pain or recent illness.  Multiple prior surgeries to that foot/ankle been in a walking boot for more than a year.  Only associated symptom is some tingling sensation in her right foot.    REVIEW OF SYSTEMS  Constitutional: Negative for fever or chills.   HENT: Negative for rhinorrhea or sore throat.    Eyes: Negative for vision changes or discharge.   Respiratory: Negative for cough or shortness of breath.    Cardiovascular: Negative for chest pain or syncope.   Gastrointestinal: Negative for nausea, vomiting, or abdominal pain.   Genitourinary: Negative for dysuria or flank pain.   Musculoskeletal: Negative for back pain or joint pain.   Skin: Positive for open wound to medial right ankle.  Also positive for mild pannicular rash.  Neurological: Positive for tingling and decrease sensation to right foot, no weakness.  See HPI for further details. All other systems are negative.     PAST MEDICAL HISTORY   has a past medical history of Arthritis,  Asthma, Congestive heart failure (HCC), Hypertension, Indigestion, and Myocardial infarct (HCC).    PAST FAMILY HISTORY  No pertinent past family history.    SOCIAL HISTORY  Social History     Tobacco Use   • Smoking status: Never Smoker   Substance and Sexual Activity   • Alcohol use: Never     Frequency: Never   • Drug use: Not on file   • Sexual activity: Not on file       SURGICAL HISTORY   has a past surgical history that includes other orthopedic surgery and gyn surgery.    CURRENT MEDICATIONS  Home Medications     Reviewed by Ericka Napoles R.N. (Registered Nurse) on 01/27/21 at 0736  Med List Status: Complete   Medication Last Dose Status   AMILoride (MIDAMOR) 5 MG Tab 1/26/2021 Active   amitriptyline (ELAVIL) 10 MG Tab 1/26/2021 Active   amitriptyline (ELAVIL) tablet 20 mg  Active   Biotin 5000 MCG Cap 1/26/2021 Active   bisacodyl (DULCOLAX) suppository 10 mg  Active   calcitonin, salmon, (MIACALCIN) 200 UNIT/ACT Solution 1/25/2021 Active   carvedilol (COREG) 25 MG Tab 1/26/2021 Active   carvedilol (COREG) tablet 25 mg  Active   cetirizine (ZYRTEC) 10 MG Tab 1/26/2021 Active   Cholecalciferol (VITAMIN D3) 2000 UNIT Cap 1/26/2021 Active   colesevelam (WELCHOL) 625 MG Tab 1/26/2021 Active   cyanocobalamin (VITAMIN B-12) 1000 MCG/ML Solution unknown Active   dexamethasone (DECADRON) 0.5 MG Tab 1/25/2021 Active   Dexlansoprazole (DEXILANT) 60 MG CAPSULE DELAYED RELEASE delayed-release capsule 1/26/2021 Active   doxazosin (CARDURA) 2 MG Tab 1/25/2021 Active   doxazosin (CARDURA) tablet 2 mg  Active   DULoxetine (CYMBALTA) 60 MG Cap DR Particles delayed-release capsule 1/25/2021 Active   DULoxetine (CYMBALTA) capsule 60 mg  Active   Erenumab (AIMOVIG) 70 MG/ML Solution Auto-injector 1/1/2021 Active   estradiol (ESTRACE) 0.5 MG tablet 1/26/2021 Active   famotidine (PEPCID) 40 MG Tab 1/25/2021 Active   famotidine (PEPCID) tablet 20 mg  Active   fentaNYL (SUBLIMAZE) injection 25-50 mcg  Active   Frovatriptan  Succinate (FROVA) 2.5 MG Tab unknown Active   furosemide (LASIX) 20 MG Tab 1/26/2021 Active   gabapentin (NEURONTIN) 400 MG Cap 1/26/2021 Active   gabapentin (NEURONTIN) capsule 300 mg  Active   heparin injection 5,000 Units  Active   hydrocortisone sodium succinate PF (SOLU-CORTEF) 100 MG injection 25 mg  Active   hydrOXYzine pamoate (VISTARIL) 25 MG Cap unknown Active   levothyroxine (SYNTHROID) 75 MCG Tab 1/26/2021 Active   levothyroxine (SYNTHROID) tablet 75 mcg  Active   liothyronine (CYTOMEL) 25 MCG Tab 1/25/2021 Active   liothyronine (CYTOMEL) tablet 25 mcg  Active   losartan (COZAAR) 100 MG Tab 1/26/2021 Active   Magnesium 400 MG Tab 1/26/2021 Active   meloxicam (MOBIC) 15 MG tablet 1/25/2021 Active   montelukast (SINGULAIR) 10 MG Tab 1/25/2021 Active   montelukast (SINGULAIR) tablet 10 mg  Active   Multiple Minerals (CALCIUM/MAGNESIUM/ZINC PO) 1/26/2021 Active   multivitamin (THERAGRAN) Tab 1/26/2021 Active   NS infusion  Active   omeprazole (PRILOSEC) capsule 20 mg  Active   ondansetron (ZOFRAN ODT) dispertab 4 mg  Active   ondansetron (ZOFRAN) syringe/vial injection 4 mg  Active   pantoprazole (PROTONIX) 40 MG Tablet Delayed Response 1/26/2021 Active   Pharmacy Consult Request - to monitor for nephrotoxic agents  Active   polyethylene glycol/lytes (MIRALAX) PACKET 1 Packet  Active   Probiotic Product (PROBIOTIC PO) 1/26/2021 Active   promethazine (PHENERGAN) suppository 12.5-25 mg  Active   senna-docusate (PERICOLACE or SENOKOT S) 8.6-50 MG per tablet 2 Tab  Active   Somatropin (ZOMACTON) 10 MG Recon Soln 1/25/2021 Active   tizanidine (ZANAFLEX) 4 MG Tab unknown Active   tizanidine (ZANAFLEX) tablet 4 mg  Active   vitamin D (cholecalciferol) tablet 2,000 Units  Active   vitamin k 100 MCG tablet 1/26/2021 Active                ALLERGIES  Allergies   Allergen Reactions   • Ancef [Cefazolin] Hives and Shortness of Breath   • Bee Venom Anaphylaxis   • Buprenorphine Hives and Shortness of Breath   •  "Clindamycin Hives and Shortness of Breath   • Contrast Media With Iodine [Iodine] Hives and Swelling   • Econazole Nitrate [Ecoza] Anaphylaxis   • Floxin Otic Anaphylaxis   • Hydrocodone-Acetaminophen Hives and Shortness of Breath   • Keflex Hives and Shortness of Breath   • Levaquin Anaphylaxis   • Morphine Anaphylaxis   • Naloxone Hives and Shortness of Breath   • Nitrofurantoin Hives and Shortness of Breath   • Nyquil Hives and Shortness of Breath   • Oxycodone Hcl Hives and Shortness of Breath   • Paricalcitol Hives, Shortness of Breath and Unspecified     CHEST PAIN   • Penicillins Hives and Shortness of Breath   • Tramadol Hives   • Vicks Dayquil Cold Hives and Shortness of Breath   • Bextra [Valdecoxib] Unspecified     Skin blisters and peels off   • Tape Unspecified     Blisters and peels off       PHYSICAL EXAM  VITAL SIGNS: /78   Pulse 84   Temp 36.4 °C (97.5 °F) (Temporal)   Resp 16   Ht 1.626 m (5' 4\")   Wt 95.3 kg (210 lb) Comment: bed scale not functioning  LMP  (LMP Unknown)   SpO2 95%   BMI 36.05 kg/m²    Pulse ox interpretation: On room air, I interpret this pulse ox as normal.  Constitutional: Chronically ill-appearing lying on the stretcher in mild distress.  HEENT: Normocephalic, atraumatic. Posterior pharynx clear, mucous membranes slightly dry.  Eyes:  EOMI. Normal sclerae.  Neck: Supple, nontender.  Chest/Pulmonary: Slightly diminished to ausculation bilaterally, no wheezes or rhonchi.  Cardiovascular: Regular rate and rhythm, subtle systolic murmur.   Abdomen: Soft, nontender; no rebound, guarding, or masses.  Back: No CVA or midline tenderness.   Musculoskeletal: No deformity or edema.  Neuro: Clear speech, normal coordination, cranial nerves II-XII grossly intact, no focal asymmetry or sensory deficits.   Psych: Normal mood and affect.  Skin: Mild pannicular rash with trace erythema no drainage, right foot is more pale than left foot, 2 cm open wound to the medial right ankle " with exposed bone.      DIAGNOSTIC STUDIES / PROCEDURES    LABS & EKG  Results for orders placed or performed during the hospital encounter of 01/26/21   SARS-COV Antigen MECCA: Collect dry nasal swab AND NP swab in VTM   Result Value Ref Range    SARS-CoV-2 Source Nasal Swab     SARS-COV ANTIGEN MCECA NotDetected Not-Detected   DIAGNOSTIC ALCOHOL   Result Value Ref Range    Diagnostic Alcohol <10.1 0.0 - 10.0 mg/dL   CBC WITHOUT DIFFERENTIAL   Result Value Ref Range    WBC 6.8 4.8 - 10.8 K/uL    RBC 3.95 (L) 4.20 - 5.40 M/uL    Hemoglobin 12.2 12.0 - 16.0 g/dL    Hematocrit 38.7 37.0 - 47.0 %    MCV 98.0 (H) 81.4 - 97.8 fL    MCH 30.9 27.0 - 33.0 pg    MCHC 31.5 (L) 33.6 - 35.0 g/dL    RDW 58.3 (H) 35.9 - 50.0 fL    Platelet Count 262 164 - 446 K/uL    MPV 10.2 9.0 - 12.9 fL   Comp Metabolic Panel   Result Value Ref Range    Sodium 128 (L) 135 - 145 mmol/L    Potassium 4.3 3.6 - 5.5 mmol/L    Chloride 95 (L) 96 - 112 mmol/L    Co2 20 20 - 33 mmol/L    Anion Gap 13.0 7.0 - 16.0    Glucose 101 (H) 65 - 99 mg/dL    Bun 31 (H) 8 - 22 mg/dL    Creatinine 2.50 (H) 0.50 - 1.40 mg/dL    Calcium 8.4 (L) 8.5 - 10.5 mg/dL    AST(SGOT) 22 12 - 45 U/L    ALT(SGPT) 23 2 - 50 U/L    Alkaline Phosphatase 79 30 - 99 U/L    Total Bilirubin 0.2 0.1 - 1.5 mg/dL    Albumin 3.6 3.2 - 4.9 g/dL    Total Protein 5.9 (L) 6.0 - 8.2 g/dL    Globulin 2.3 1.9 - 3.5 g/dL    A-G Ratio 1.6 g/dL   Prothrombin Time   Result Value Ref Range    PT 13.2 12.0 - 14.6 sec    INR 0.97 0.87 - 1.13   APTT   Result Value Ref Range    APTT 23.4 (L) 24.7 - 36.0 sec   HCG QUAL SERUM   Result Value Ref Range    Beta-Hcg Qualitative Serum Negative Negative   COD - Adult (Type and Screen)   Result Value Ref Range    ABO Grouping Only O     Rh Grouping Only POS     Antibody Screen-Cod NEG    ABO Rh Confirm   Result Value Ref Range    ABO Rh Confirm O POS    ESTIMATED GFR   Result Value Ref Range    GFR If  24 (A) >60 mL/min/1.73 m 2    GFR If Non African  American 20 (A) >60 mL/min/1.73 m 2   CBC with Differential   Result Value Ref Range    WBC 8.0 4.8 - 10.8 K/uL    RBC 4.24 4.20 - 5.40 M/uL    Hemoglobin 13.2 12.0 - 16.0 g/dL    Hematocrit 41.4 37.0 - 47.0 %    MCV 97.6 81.4 - 97.8 fL    MCH 31.1 27.0 - 33.0 pg    MCHC 31.9 (L) 33.6 - 35.0 g/dL    RDW 58.8 (H) 35.9 - 50.0 fL    Platelet Count 299 164 - 446 K/uL    MPV 10.0 9.0 - 12.9 fL    Neutrophils-Polys 73.40 (H) 44.00 - 72.00 %    Lymphocytes 14.90 (L) 22.00 - 41.00 %    Monocytes 10.30 0.00 - 13.40 %    Eosinophils 0.50 0.00 - 6.90 %    Basophils 0.40 0.00 - 1.80 %    Immature Granulocytes 0.50 0.00 - 0.90 %    Nucleated RBC 0.00 /100 WBC    Neutrophils (Absolute) 5.85 2.00 - 7.15 K/uL    Lymphs (Absolute) 1.19 1.00 - 4.80 K/uL    Monos (Absolute) 0.82 0.00 - 0.85 K/uL    Eos (Absolute) 0.04 0.00 - 0.51 K/uL    Baso (Absolute) 0.03 0.00 - 0.12 K/uL    Immature Granulocytes (abs) 0.04 0.00 - 0.11 K/uL    NRBC (Absolute) 0.00 K/uL   Comp Metabolic Panel (CMP)   Result Value Ref Range    Sodium 133 (L) 135 - 145 mmol/L    Potassium 4.4 3.6 - 5.5 mmol/L    Chloride 100 96 - 112 mmol/L    Co2 21 20 - 33 mmol/L    Anion Gap 12.0 7.0 - 16.0    Glucose 112 (H) 65 - 99 mg/dL    Bun 26 (H) 8 - 22 mg/dL    Creatinine 1.76 (H) 0.50 - 1.40 mg/dL    Calcium 8.8 8.5 - 10.5 mg/dL    AST(SGOT) 19 12 - 45 U/L    ALT(SGPT) 24 2 - 50 U/L    Alkaline Phosphatase 92 30 - 99 U/L    Total Bilirubin 0.4 0.1 - 1.5 mg/dL    Albumin 3.8 3.2 - 4.9 g/dL    Total Protein 6.2 6.0 - 8.2 g/dL    Globulin 2.4 1.9 - 3.5 g/dL    A-G Ratio 1.6 g/dL   CORTISOL   Result Value Ref Range    Cortisol 16.0 0.0 - 23.0 ug/dL   ESTIMATED GFR   Result Value Ref Range    GFR If  36 (A) >60 mL/min/1.73 m 2    GFR If Non African American 30 (A) >60 mL/min/1.73 m 2   EKG   Result Value Ref Range    Report       [facility] Emergency Dept[facility]    Test Date:  2021-01-26  Pt Name:    CARMINA MORAN              Department:  ER  MRN:        [id number]                      Room:       [facility]  Gender:     F                            Technician: [id number]  :        [id number]                   Requested By:KRAIG NUNN  Order #:    [id number]                    Reading MD: Kraig Nunn MD    Measurements  Intervals                                Axis  Rate:       69                           P:          18  WA:         228                          QRS:        44  QRSD:       88                           T:          81  QT:         416  QTc:        446    Interpretive Statements  SINUS RHYTHM  FIRST DEGREE AV BLOCK  BORDERLINE LOW VOLTAGE IN FRONTAL LEADS  BORDERLINE T ABNORMALITIES, ANT-LAT LEADS  BASELINE WANDER IN LEAD(S) V4,V6  No previous ECG available for comparison  No STEMI  Electronically Signed On 2021 22:46:04 PST by Kraig Nunn MD           RADIOLOGY  DX-ANKLE 2- VIEWS RIGHT   Final Result      Intraoperative fluoroscopic spot images as described above.      DX-PORTABLE FLUOROSCOPY < 1 HOUR Is the patient pregnant? No   Final Result      Portable fluoroscopy utilized for 6 seconds.         INTERPRETING LOCATION: [address], [place], [address]      US-RENAL   Final Result      Normal renal ultrasound.      DX-ANKLE 3+ VIEWS LEFT   Final Result      1.  No acute left ankle fracture or dislocation.      2.  ORIF changes involving the distal fibula and tibia.      DX-TIBIA AND FIBULA LEFT   Final Result      1.  Acute appearing oblique nondisplaced fracture of the proximal left fibular metaphysis.      2.  No acute tibial fracture identified.      3.  ORIF hardware in the distal left fibula and distal left tibia.      DX-CHEST-PORTABLE (1 VIEW)   Final Result      No acute cardiac or pulmonary abnormalities are identified.      DX-TIBIA AND FIBULA RIGHT   Final Result      Distal tibia and fibular fractures.                  INTERPRETING LOCATION:  [address], [place], [address]      DX-ANKLE 3+ VIEWS RIGHT   Final Result       Satisfactory alignment of bimalleolar fracture dislocation of the right ankle following closed reduction and splint application.      DX-ANKLE 2- VIEWS RIGHT   Final Result      1.  Satisfactory relocation of the right ankle.      2.  Acute bimalleolar fracture of the right ankle.      DX-ANKLE 2- VIEWS RIGHT   Final Result      Acute bimalleolar fracture dislocation of the right ankle.          PROCEDURES    Joint Reduction Procedure Note    Indication: Joint dislocation and fracture    Consent: The patient provided verbal consent for this procedure.    Procedure: The pre-reduction exam showed distal neurologic function to be impaired. The patient was placed in the supine position. Anesthesia/pain control was obtained wiith fentanyl given by EMS. Reduction of the right ankle was performed by direct traction and internal rotation. Post reduction films were obtained and revealed satisfactory reduction. A post-reduction exam revealed distal perfusion & neurologic function to be normal. The affected area was immobilized with a posterior ankle splint.    The patient tolerated the procedure well.    Complications: None            COURSE & MEDICAL DECISION MAKING    This is a 55 y.o. female who presents with right ankle deformity and dislocation with obvious open fracture from low level ground-level fall.    Differential Diagnosis includes but is not limited to:  Fracture, dislocation, open joint, fall    ED Course:  Unfortunate 55-year-old female who felt lightheaded and slipped and fell at home with obvious open ankle fracture.  Pain well controlled with 200 mcg of fentanyl given by medics prior to arrival, no pulse in the foot so obvious neurovascular compromise and emergent reduction completed on arrival in trauma bay.  Patient tolerated reduction well and pulses returned.  Follow-up x-ray shows good alignment with numerous fractures.  Orthopedics immediately consulted they will take the patient to the OR.  She  has a complex medical history, so medical team was consulted per primary admitting service.  Patient has no other recent illness, stable vital signs, and no other signs of serious trauma on thorough head to toe examination.  Trauma surgery was consulted and I reviewed the case with trauma surgeon Dr. Steele, but with no other obvious trauma on examination and medical complexity no need for trauma surgery intervention and agrees with plan for medical admission.  Patient hemodynamically stable for admission and operative repair of this open fracture in guarded condition.  Antibiotics given.  Numerous allergies single dose of gentamicin given, kidney function can be trended closely while in the hospital.    Medications   senna-docusate (PERICOLACE or SENOKOT S) 8.6-50 MG per tablet 2 Tab ( Oral MAR Unhold 1/27/21 1037)     And   polyethylene glycol/lytes (MIRALAX) PACKET 1 Packet ( Oral MAR Unhold 1/27/21 1037)     And   bisacodyl (DULCOLAX) suppository 10 mg ( Rectal MAR Unhold 1/27/21 1037)   ondansetron (ZOFRAN) syringe/vial injection 4 mg ( Intravenous MAR Unhold 1/27/21 1037)   ondansetron (ZOFRAN ODT) dispertab 4 mg ( Oral MAR Unhold 1/27/21 1037)   promethazine (PHENERGAN) suppository 12.5-25 mg ( Rectal MAR Unhold 1/27/21 1037)   fentaNYL (SUBLIMAZE) injection 25-50 mcg ( Intravenous MAR Unhold 1/27/21 1037)   carvedilol (COREG) tablet 25 mg ( Oral MAR Unhold 1/27/21 1037)   vitamin D (cholecalciferol) tablet 2,000 Units ( Oral MAR Unhold 1/27/21 1037)   doxazosin (CARDURA) tablet 2 mg ( Oral MAR Unhold 1/27/21 1037)   DULoxetine (CYMBALTA) capsule 60 mg ( Oral MAR Unhold 1/27/21 1037)   famotidine (PEPCID) tablet 20 mg ( Oral MAR Unhold 1/27/21 1038)   gabapentin (NEURONTIN) capsule 300 mg ( Oral MAR Unhold 1/27/21 1038)   levothyroxine (SYNTHROID) tablet 75 mcg ( Oral MAR Unhold 1/27/21 1038)   liothyronine (CYTOMEL) tablet 25 mcg ( Oral MAR Unhold 1/27/21 1038)   montelukast (SINGULAIR) tablet 10 mg ( Oral  MAR Unhold 1/27/21 1038)   omeprazole (PRILOSEC) capsule 20 mg ( Oral MAR Unhold 1/27/21 1038)   tizanidine (ZANAFLEX) tablet 4 mg ( Oral MAR Unhold 1/27/21 1038)   Pharmacy Consult Request - to monitor for nephrotoxic agents ( Other MAR Unhold 1/27/21 1038)   NS infusion ( Intravenous New Bag 1/26/21 2341)   hydrocortisone sodium succinate PF (SOLU-CORTEF) 100 MG injection 25 mg ( Intravenous MAR Unhold 1/27/21 1038)   heparin injection 5,000 Units ( Subcutaneous MAR Unhold 1/27/21 1038)   amitriptyline (ELAVIL) tablet 20 mg ( Oral MAR Unhold 1/27/21 1038)   gentamicin (GARAMYCIN) 480 mg in  mL IVPB (0 mg/kg × 95.3 kg Intravenous Stopped 1/26/21 1817)   lactated ringers infusion (BOLUS) ( Intravenous Stopped 1/26/21 1823)   sodium chloride for irrigation 0.9 % irrigant (1,000 mL Irrigation New Bag 1/27/21 0842)       FINAL IMPRESSION  1. Type III open fracture of right ankle, initial encounter    2. Fall from ground level    3. Acute kidney injury (HCC)    4. Hyponatremia          -ADMIT-      Pertinent Labs & Imaging studies reviewed and verified by myself, as well as nursing notes and the patient's past medical, family, and social histories (See chart for details).    Portions of this record were made with voice recognition software.  Despite my review, spelling/grammar/context errors may still remain.  Interpretation of this chart should be taken in this context.    Electronically signed by Kraig Calvin M.D. on 1/27/2021 at 10:39 AM.
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
## 2021-01-27 NOTE — ED NOTES
Med rec complete per pt's med list. Reviewed list with pt. She reports not taking potassium and cevimeline.

## 2021-01-27 NOTE — ANESTHESIA PROCEDURE NOTES
Peripheral Block    Date/Time: 1/27/2021 8:10 AM  Performed by: Martha Gomez M.D.  Authorized by: Martha Gomez M.D.     Patient Location:  OR  Start Time:  1/27/2021 8:10 AM  End Time:  1/27/2021 8:14 AM  Reason for Block: at surgeon's request and post-op pain management ONLY    patient identified, IV checked, site marked, risks and benefits discussed, surgical consent, monitors and equipment checked, pre-op evaluation and timeout performed    Patient Position:  Supine  Prep: ChloraPrep    Monitoring:  Heart rate, continuous pulse ox and cardiac monitor  Block Region:  Lower Extremity  Lower Extremity - Block Type:  SCIATIC nerve block, lateral approach and Selective FEMORAL nerve block at the Adductor Canal    Laterality:  Right  Procedures: ultrasound guided  Image captured, interpreted and electronically stored.  Local Infiltration:  Lidocaine  Strength:  1 %  Dose:  5 ml  Block Type:  Single-shot  Needle Length:  100mm  Needle Gauge:  21 G  Needle Localization:  Ultrasound guidance  Injection Assessment:  Negative aspiration for heme, no paresthesia on injection, incremental injection and local visualized surrounding nerve on ultrasound   Time out performed. Right leg propped on support. Area prepped and draped in sterile fashion. Ultrasound probe in popliteal fossa, sciatic nerve identified as 2 nerves converging - tibial and common peroneal and echogenic needle advanced with in plane visualization near nerves, local anesthetic deposited incrementally (30 ml total of ropivacaine 0.5%) with negative aspiration and good spread seen. Attention then moved to medial thigh and with high ultrasound probe, deep femoral artery identified and echogenic needle advanced with in plane visualization and local anesthetic deposited (10 ml ropivacaine 0.5%) with negative aspiration deep to sartorius fascia and lateral to deep femoral artery with good spread of local anesthetic seen.

## 2021-01-27 NOTE — NON-PROVIDER
Admit: 1/26/2021  5:04 PM  Hospital Day: 1  CC: RIGHT ankle fracture. LEFT ankle sprain.  55 y.o. female with multiple allergies and prior head injury requiring outpatient endocrinology management presents with RIGHT bimalleolar ankle fracture status post ORIF 1/27. Pt eating and has no complaints after surgery. Pain well controlled with residual nerve block.    ROS Negative for: SOB, CP, new cough, abdominal pain, chills, rash.    Allergies:    Allergies   Allergen Reactions   • Ancef [Cefazolin] Hives and Shortness of Breath   • Bee Venom Anaphylaxis   • Buprenorphine Hives and Shortness of Breath   • Clindamycin Hives and Shortness of Breath   • Contrast Media With Iodine [Iodine] Hives and Swelling   • Econazole Nitrate [Ecoza] Anaphylaxis   • Floxin Otic Anaphylaxis   • Hydrocodone-Acetaminophen Hives and Shortness of Breath   • Keflex Hives and Shortness of Breath   • Levaquin Anaphylaxis   • Morphine Anaphylaxis   • Naloxone Hives and Shortness of Breath   • Nitrofurantoin Hives and Shortness of Breath   • Nyquil Hives and Shortness of Breath   • Oxycodone Hcl Hives and Shortness of Breath   • Paricalcitol Hives, Shortness of Breath and Unspecified     CHEST PAIN   • Penicillins Hives and Shortness of Breath   • Tramadol Hives   • Vicks Dayquil Cold Hives and Shortness of Breath   • Bextra [Valdecoxib] Unspecified     Skin blisters and peels off   • Tape Unspecified     Blisters and peels off     Pt reports Outpt Rx:    No current facility-administered medications on file prior to encounter.      Current Outpatient Medications on File Prior to Encounter   Medication Sig Dispense Refill   • cetirizine (ZYRTEC) 10 MG Tab Take 20 mg by mouth 2 (two) times a day.     • Erenumab (AIMOVIG) 70 MG/ML Solution Auto-injector Inject 140 mg under the skin Q30 DAYS.     • AMILoride (MIDAMOR) 5 MG Tab Take 5 mg by mouth every morning.     • amitriptyline (ELAVIL) 10 MG Tab Take 20 mg by mouth 2 (two) times a day.     •  calcitonin, salmon, (MIACALCIN) 200 UNIT/ACT Solution Administer 1 Spray into affected nostril(S) every bedtime.     • carvedilol (COREG) 25 MG Tab Take 25 mg by mouth 2 times a day, with meals.     • colesevelam (WELCHOL) 625 MG Tab Take 625 mg by mouth 3 times a day.     • cyanocobalamin (VITAMIN B-12) 1000 MCG/ML Solution Inject 1,000 mcg into the shoulder, thigh, or buttocks every 30 days.     • dexamethasone (DECADRON) 0.5 MG Tab Take 0.5 mg by mouth every bedtime.     • Dexlansoprazole (DEXILANT) 60 MG CAPSULE DELAYED RELEASE delayed-release capsule Take 60 mg by mouth every morning.     • doxazosin (CARDURA) 2 MG Tab Take 2 mg by mouth every bedtime.     • DULoxetine (CYMBALTA) 60 MG Cap DR Particles delayed-release capsule Take 60 mg by mouth every bedtime.     • estradiol (ESTRACE) 0.5 MG tablet Take 0.5 mg by mouth every morning.     • famotidine (PEPCID) 40 MG Tab Take 40 mg by mouth every bedtime.     • Frovatriptan Succinate (FROVA) 2.5 MG Tab Take 2.5 mg by mouth as needed.     • furosemide (LASIX) 20 MG Tab Take 20 mg by mouth 2 times a day.     • gabapentin (NEURONTIN) 400 MG Cap Take 400 mg by mouth 3 times a day.     • hydrOXYzine pamoate (VISTARIL) 25 MG Cap Take 25 mg by mouth every 6 hours as needed (for hives).     • liothyronine (CYTOMEL) 25 MCG Tab Take 25 mcg by mouth every bedtime.     • losartan (COZAAR) 100 MG Tab Take 100 mg by mouth every morning.     • meloxicam (MOBIC) 15 MG tablet Take 15 mg by mouth every bedtime.     • montelukast (SINGULAIR) 10 MG Tab Take 10 mg by mouth every bedtime.     • pantoprazole (PROTONIX) 40 MG Tablet Delayed Response Take 40 mg by mouth every day.     • levothyroxine (SYNTHROID) 75 MCG Tab Take 75 mcg by mouth Every morning on an empty stomach.     • tizanidine (ZANAFLEX) 4 MG Tab Take 8 mg by mouth every 6 hours as needed.     • Somatropin (ZOMACTON) 10 MG Recon Soln Inject 0.3 mg under the skin every bedtime.     • Biotin 5000 MCG Cap Take 1 Cap by  mouth every day.     • Multiple Minerals (CALCIUM/MAGNESIUM/ZINC PO) Take 1 Tab by mouth every day.     • Cholecalciferol (VITAMIN D3) 2000 UNIT Cap Take 2,000 Units by mouth every day.     • Magnesium 400 MG Tab Take 400 mg by mouth every day.     • multivitamin (THERAGRAN) Tab Take 1 Tab by mouth every day.     • Probiotic Product (PROBIOTIC PO) Take 1 Cap by mouth every day.     • vitamin k 100 MCG tablet Take 100 mcg by mouth every day.       Scheduled    •  senna-docusate, 2 Tab, Oral, BID    •  carvedilol, 25 mg, Oral, BID WITH MEALS    •  vitamin D, 2,000 Units, Oral, DAILY    •  doxazosin, 2 mg, Oral, QHS    •  DULoxetine, 60 mg, Oral, QHS    •  famotidine, 20 mg, Oral, QHS    •  gabapentin, 300 mg, Oral, BID    •  levothyroxine, 75 mcg, Oral, AM ES    •  liothyronine, 25 mcg, Oral, QHS    •  montelukast, 10 mg, Oral, QHS    •  omeprazole, 20 mg, Oral, DAILY    •  Pharmacy, 1 Each, Other, PHARMACY TO DOSE    •  hydrocortisone sodium succinate PF, 25 mg, Intravenous, Q8HRS    •  heparin, 5,000 Units, Subcutaneous, Q8HRS    •  amitriptyline, 20 mg, Oral, Q EVENING      senna-docusate **AND** polyethylene glycol/lytes **AND** [DISCONTINUED] magnesium hydroxide **AND** bisacodyl, ondansetron, ondansetron, promethazine, fentaNYL, tizanidine      PMH:    1. Type III open fracture of right ankle, initial encounter    2. Fall from ground level    3. Acute kidney injury (HCC)    4. Hyponatremia      Past Medical History:   Diagnosis Date   • Arthritis    • Asthma    • Congestive heart failure (HCC)    • Hypertension    • Indigestion    • Myocardial infarct (HCC)        Surgical History:    Past Surgical History:   Procedure Laterality Date   • GYN SURGERY     • OTHER ORTHOPEDIC SURGERY       Social History     Tobacco Use   • Smoking status: Never Smoker   Substance Use Topics   • Alcohol use: Never     Frequency: Never   • Drug use: Not on file     Social History     Substance and Sexual Activity   Drug Use Not on  "file     Social History     Tobacco Use   Smoking Status Never Smoker       Intake/Output Summary (Last 24 hours) at 1/27/2021 1317  Last data filed at 1/27/2021 0957  Gross per 24 hour   Intake 2060 ml   Output 320 ml   Net 1740 ml     Temp:  [35.8 °C (96.5 °F)-37 °C (98.6 °F)] 36.3 °C (97.3 °F)  Pulse:  [] 95  Resp:  [15-18] 16  BP: (101-170)/() 170/113  SpO2:  [92 %-99 %] 95 %  BP (!) 170/113   Pulse 95   Temp 36.3 °C (97.3 °F) (Temporal)   Resp 16   Ht 1.626 m (5' 4\")   Wt 95.3 kg (210 lb) Comment: bed scale not functioning  LMP  (LMP Unknown)   SpO2 95%   BMI 36.05 kg/m²     General Appearance: sitting on the side of the bed eating lunch, smiling.  HEENT: EOM intact, symmetrical facial features. Poor dentition.  Cardiovascular: S1, S2, no murmurs nor rubs.   Lungs: vesicular. No crackles nor wheeze. Good chest expansion on encouragement.  Abdomen: +bs, non-tender.  Extremities: Bilateral LE exam limited due to recent wraps post-surgery. No swelling above wraps.    Patient Lines/Drains/Airways Status    Active Wounds     Name: Placement date: Placement time: Site: Days:    Wound 01/27/21 Incision Ankle Right xeroform, 4x4s, soft roll, plaster splint, ace  01/27/21   0901   Ankle  less than 1                  Recent Labs     01/26/21  1706 01/27/21  0330   WBC 6.8 8.0   RBC 3.95* 4.24   HEMOGLOBIN 12.2 13.2   HEMATOCRIT 38.7 41.4   MCV 98.0* 97.6   MCH 30.9 31.1   MCHC 31.5* 31.9*   RDW 58.3* 58.8*   PLATELETCT 262 299   MPV 10.2 10.0     Recent Results (from the past 24 hour(s))   DIAGNOSTIC ALCOHOL    Collection Time: 01/26/21  5:06 PM   Result Value Ref Range    Diagnostic Alcohol <10.1 0.0 - 10.0 mg/dL   CBC WITHOUT DIFFERENTIAL    Collection Time: 01/26/21  5:06 PM   Result Value Ref Range    WBC 6.8 4.8 - 10.8 K/uL    RBC 3.95 (L) 4.20 - 5.40 M/uL    Hemoglobin 12.2 12.0 - 16.0 g/dL    Hematocrit 38.7 37.0 - 47.0 %    MCV 98.0 (H) 81.4 - 97.8 fL    MCH 30.9 27.0 - 33.0 pg    MCHC 31.5 " (L) 33.6 - 35.0 g/dL    RDW 58.3 (H) 35.9 - 50.0 fL    Platelet Count 262 164 - 446 K/uL    MPV 10.2 9.0 - 12.9 fL   Comp Metabolic Panel    Collection Time: 01/26/21  5:06 PM   Result Value Ref Range    Sodium 128 (L) 135 - 145 mmol/L    Potassium 4.3 3.6 - 5.5 mmol/L    Chloride 95 (L) 96 - 112 mmol/L    Co2 20 20 - 33 mmol/L    Anion Gap 13.0 7.0 - 16.0    Glucose 101 (H) 65 - 99 mg/dL    Bun 31 (H) 8 - 22 mg/dL    Creatinine 2.50 (H) 0.50 - 1.40 mg/dL    Calcium 8.4 (L) 8.5 - 10.5 mg/dL    AST(SGOT) 22 12 - 45 U/L    ALT(SGPT) 23 2 - 50 U/L    Alkaline Phosphatase 79 30 - 99 U/L    Total Bilirubin 0.2 0.1 - 1.5 mg/dL    Albumin 3.6 3.2 - 4.9 g/dL    Total Protein 5.9 (L) 6.0 - 8.2 g/dL    Globulin 2.3 1.9 - 3.5 g/dL    A-G Ratio 1.6 g/dL   Prothrombin Time    Collection Time: 01/26/21  5:06 PM   Result Value Ref Range    PT 13.2 12.0 - 14.6 sec    INR 0.97 0.87 - 1.13   APTT    Collection Time: 01/26/21  5:06 PM   Result Value Ref Range    APTT 23.4 (L) 24.7 - 36.0 sec   HCG QUAL SERUM    Collection Time: 01/26/21  5:06 PM   Result Value Ref Range    Beta-Hcg Qualitative Serum Negative Negative   COD - Adult (Type and Screen)    Collection Time: 01/26/21  5:06 PM   Result Value Ref Range    ABO Grouping Only O     Rh Grouping Only POS     Antibody Screen-Cod NEG    ESTIMATED GFR    Collection Time: 01/26/21  5:06 PM   Result Value Ref Range    GFR If  24 (A) >60 mL/min/1.73 m 2    GFR If Non African American 20 (A) >60 mL/min/1.73 m 2   SARS-COV Antigen MECCA: Collect dry nasal swab AND NP swab in VTM    Collection Time: 01/26/21  5:17 PM   Result Value Ref Range    SARS-CoV-2 Source Nasal Swab     SARS-COV ANTIGEN MECCA NotDetected Not-Detected   ABO Rh Confirm    Collection Time: 01/26/21  6:05 PM   Result Value Ref Range    ABO Rh Confirm O POS    EKG    Collection Time: 01/26/21  6:35 PM   Result Value Ref Range    Report       [facility]    Test Date:   2021  Pt Name:    CARMINA MORAN              Department: ER  MRN:        [id number]                      Room:       T303  Gender:     F                            Technician: [id number]  :        [id number]                   Requested By:KRAIG NUNN  Order #:    [id number]                    Reading MD: Kraig Nunn MD    Measurements  Intervals                                Axis  Rate:       69                           P:          18  NV:         228                          QRS:        44  QRSD:       88                           T:          81  QT:         416  QTc:        446    Interpretive Statements  SINUS RHYTHM  FIRST DEGREE AV BLOCK  BORDERLINE LOW VOLTAGE IN FRONTAL LEADS  BORDERLINE T ABNORMALITIES, ANT-LAT LEADS  BASELINE WANDER IN LEAD(S) V4,V6  No previous ECG available for comparison  No STEMI  Electronically Signed On 2021 22:46:04 PST by Kraig Nunn MD     CBC with Differential    Collection Time: 21  3:30 AM   Result Value Ref Range    WBC 8.0 4.8 - 10.8 K/uL    RBC 4.24 4.20 - 5.40 M/uL    Hemoglobin 13.2 12.0 - 16.0 g/dL    Hematocrit 41.4 37.0 - 47.0 %    MCV 97.6 81.4 - 97.8 fL    MCH 31.1 27.0 - 33.0 pg    MCHC 31.9 (L) 33.6 - 35.0 g/dL    RDW 58.8 (H) 35.9 - 50.0 fL    Platelet Count 299 164 - 446 K/uL    MPV 10.0 9.0 - 12.9 fL    Neutrophils-Polys 73.40 (H) 44.00 - 72.00 %    Lymphocytes 14.90 (L) 22.00 - 41.00 %    Monocytes 10.30 0.00 - 13.40 %    Eosinophils 0.50 0.00 - 6.90 %    Basophils 0.40 0.00 - 1.80 %    Immature Granulocytes 0.50 0.00 - 0.90 %    Nucleated RBC 0.00 /100 WBC    Neutrophils (Absolute) 5.85 2.00 - 7.15 K/uL    Lymphs (Absolute) 1.19 1.00 - 4.80 K/uL    Monos (Absolute) 0.82 0.00 - 0.85 K/uL    Eos (Absolute) 0.04 0.00 - 0.51 K/uL    Baso (Absolute) 0.03 0.00 - 0.12 K/uL    Immature Granulocytes (abs) 0.04 0.00 - 0.11 K/uL    NRBC (Absolute) 0.00 K/uL   Comp Metabolic Panel (CMP)    Collection Time: 21  3:30 AM   Result Value Ref  Range    Sodium 133 (L) 135 - 145 mmol/L    Potassium 4.4 3.6 - 5.5 mmol/L    Chloride 100 96 - 112 mmol/L    Co2 21 20 - 33 mmol/L    Anion Gap 12.0 7.0 - 16.0    Glucose 112 (H) 65 - 99 mg/dL    Bun 26 (H) 8 - 22 mg/dL    Creatinine 1.76 (H) 0.50 - 1.40 mg/dL    Calcium 8.8 8.5 - 10.5 mg/dL    AST(SGOT) 19 12 - 45 U/L    ALT(SGPT) 24 2 - 50 U/L    Alkaline Phosphatase 92 30 - 99 U/L    Total Bilirubin 0.4 0.1 - 1.5 mg/dL    Albumin 3.8 3.2 - 4.9 g/dL    Total Protein 6.2 6.0 - 8.2 g/dL    Globulin 2.4 1.9 - 3.5 g/dL    A-G Ratio 1.6 g/dL   CORTISOL    Collection Time: 01/27/21  3:30 AM   Result Value Ref Range    Cortisol 16.0 0.0 - 23.0 ug/dL   ESTIMATED GFR    Collection Time: 01/27/21  3:30 AM   Result Value Ref Range    GFR If  36 (A) >60 mL/min/1.73 m 2    GFR If Non African American 30 (A) >60 mL/min/1.73 m 2   TSH WITH REFLEX TO FT4    Collection Time: 01/27/21 10:56 AM   Result Value Ref Range    TSH 0.994 0.380 - 5.330 uIU/mL   Prothrombin Time    Collection Time: 01/27/21 10:56 AM   Result Value Ref Range    PT 12.7 12.0 - 14.6 sec    INR 0.92 0.87 - 1.13   CREATINE KINASE    Collection Time: 01/27/21 10:56 AM   Result Value Ref Range    CPK Total 53 0 - 154 U/L       Recent Labs     01/26/21  1706 01/27/21  1056   APTT 23.4*  --    INR 0.97 0.92     Calcium   Date Value Ref Range Status   01/27/2021 8.8 8.5 - 10.5 mg/dL Final     Imaging:        US-RENAL   Final Result   Normal renal ultrasound.   DX-CHEST-PORTABLE (1 VIEW)   Final Result      No acute cardiac or pulmonary abnormalities are identified.      DX-TIBIA AND FIBULA RIGHT   Final Result      Distal tibia and fibular fractures.        ___________________________________________________  55 y.o. female with multiple allergies and prior head injury requiring outpatient endocrinology management presents with RIGHT bimalleolar ankle fracture status post ORIF 1/27. Pt eating and has no complaints after surgery. Pain well controlled  with residual nerve block.    ________________________Acute ______________________  #RIGHT bimalleolar Fx (ORIF 1/27)  RIGHT tib-fib fracture s/p ORIF.   -gentamicin OK (see ppx abx allergies)  -pain management reviewed  -PT OK'ed for toe-touch wt bearing  -continue home steroid (see below)    #Chronic Steroid Use (2/2 head injury, managed by outpt endocrinologist)  #Hypothyroid (chronic)  Continue home meds with addition of hydrocortisone to avoid adrenal insufficiency.  -0.5 dexamethasone Qday + perioperative hydrocortisone  -Levothyroxine 75 mcg Qday  -Liothyroxine 25 mcg Qday  ________________________Stable/Chronic ________________________  #HTN (essential, stable)  #CHF (chronic)  Chronic medical history, continue home medication.  -carvedilol 25mg PO BID  -hydralazine PRN  -Consider restarting: Losartan 100mg PO Qday and Amiloride 5mg Qday    #Hyponatremia (resolved with IVF)  Likely dehydration and NPO. Pt endorses her thirst and increased volume intake is managed with her endocrinologist. Cortisol and TSH WNL during this admission.   -continue PO nutrition    #MARY LOU (resolving)  On arrival Cr: 2.5. Likely pre-renal, given she is improving with IVF and holding: NSAIDs and diuretics.  -cautiously restart home rx, given hydration status improving after surgery        Diet/IVF: Adult  PPX: Standard Heparin   Bowel: Scheduled Senna + PRN protocol   Outpt Providers: Madisyn Mccullough M.D.   Code: Full    Carlie Beavers  Internal Medicine Sub-I  4th Year Medical Student  [facility]  1/27/2021 1:24 PM

## 2021-01-27 NOTE — PROGRESS NOTES
Pt arrived from PACU. A&Ox4, denies pain. Able to wiggle R toes. Numbness to R foot d/t nerve block. Toes warm, cap refill <2 seconds.   SCD on LLE.

## 2021-01-27 NOTE — ASSESSMENT & PLAN NOTE
Possibly secondary to hypovolemia  Will hold Lasix, losartan, NSAID  IV fluids  -Bladder scan  -Urine electrolytes  -Renal ultrasound  -Urinalysis  -Monitor in and out  -Repeat BMP  Avoid nephrotoxins; adjust medications doses

## 2021-01-27 NOTE — PROGRESS NOTES
Hospital Medicine Daily Progress Note    Date of Service  1/27/2021    Chief Complaint  55 y.o. female admitted 1/26/2021 with open ankle fracture    Hospital Course  Patient underwent successful ORIF with orthopedic surgery on hospital day 1.  Procedure was uncomplicated.  Patient will be seen by physical therapy this afternoon, placement recommendations thereafter.  She is to be toe-touch weightbearing to the right lower extremity for the from in 6 weeks with outpatient follow-up with orthopedic surgery thereafter.    Interval Problem Update  NAOE.  Patient tells me that she is supposed to receive 12 weeks of parenteral antibiotics for a sinus infection.  She is asking if this can be initiated as an inpatient.  This is deferred to her outpatient physicians.    Consultants/Specialty  Orthho    Code Status  Full Code    Disposition  Pending PT evals.  Likely home with HH in AM.      Review of Systems  All systems reviewed and negative except as noted per above.    Physical Exam  Temp:  [35.8 °C (96.5 °F)-37 °C (98.6 °F)] 36.3 °C (97.3 °F)  Pulse:  [] 95  Resp:  [15-18] 16  BP: (101-170)/() 170/113  SpO2:  [92 %-99 %] 95 %    General: No acute distress  HEENT atraumatic, normocephalic, pupils equal round reactive to light  Neck: No JVD  Chest: Respirations are unlabored  Cardiac: Physiologic S1 and S2  Abdomen: Soft, nontender, nondistended  Extremities: Without clubbing, cyanosis or edema.  Bandages are clean dry and intact the right lower extremity.  Neuro: Cranial nerves II through XII are grossly intact.  Psych: No anxiety, judgement intact.          Current Facility-Administered Medications:   •  senna-docusate (PERICOLACE or SENOKOT S) 8.6-50 MG per tablet 2 Tab, 2 Tab, Oral, BID **AND** polyethylene glycol/lytes (MIRALAX) PACKET 1 Packet, 1 Packet, Oral, QDAY PRN **AND** [DISCONTINUED] magnesium hydroxide (MILK OF MAGNESIA) suspension 30 mL, 30 mL, Oral, QDAY PRN **AND** bisacodyl (DULCOLAX)  suppository 10 mg, 10 mg, Rectal, QDAY PRN, Beau Allen M.D.  •  ondansetron (ZOFRAN) syringe/vial injection 4 mg, 4 mg, Intravenous, Q4HRS PRN, Beau Allen M.D.  •  ondansetron (ZOFRAN ODT) dispertab 4 mg, 4 mg, Oral, Q4HRS PRN, Beau Allen M.D.  •  promethazine (PHENERGAN) suppository 12.5-25 mg, 12.5-25 mg, Rectal, Q4HRS PRN, Beau Allen M.D.  •  fentaNYL (SUBLIMAZE) injection 25-50 mcg, 25-50 mcg, Intravenous, Q HOUR PRN, Beau Allen M.D., 50 mcg at 01/27/21 0406  •  carvedilol (COREG) tablet 25 mg, 25 mg, Oral, BID WITH MEALS, Beau Allen M.D., 25 mg at 01/27/21 1216  •  vitamin D (cholecalciferol) tablet 2,000 Units, 2,000 Units, Oral, DAILY, Beau Allen M.D.  •  doxazosin (CARDURA) tablet 2 mg, 2 mg, Oral, QHS, Beau Allen M.D.  •  DULoxetine (CYMBALTA) capsule 60 mg, 60 mg, Oral, QHS, Beau Allen M.D., 60 mg at 01/26/21 2245  •  famotidine (PEPCID) tablet 20 mg, 20 mg, Oral, QHS, Beau Allen M.D., 20 mg at 01/26/21 2340  •  gabapentin (NEURONTIN) capsule 300 mg, 300 mg, Oral, BID, Beau Allen M.D., 300 mg at 01/26/21 2340  •  levothyroxine (SYNTHROID) tablet 75 mcg, 75 mcg, Oral, AM ES, Beau Allen M.D., 75 mcg at 01/27/21 0616  •  liothyronine (CYTOMEL) tablet 25 mcg, 25 mcg, Oral, QHS, Beau Allen M.D.  •  montelukast (SINGULAIR) tablet 10 mg, 10 mg, Oral, QHS, Beau Allen M.D., 10 mg at 01/26/21 2340  •  omeprazole (PRILOSEC) capsule 20 mg, 20 mg, Oral, DAILY, Beau Allen M.D., 20 mg at 01/27/21 0616  •  tizanidine (ZANAFLEX) tablet 4 mg, 4 mg, Oral, Q6HRS PRN, Beau Allen M.D., 4 mg at 01/27/21 1216  •  Pharmacy Consult Request - to monitor for nephrotoxic agents, 1 Each, Other, PHARMACY TO DOSE, Beau Allen M.D.  •  NS infusion, , Intravenous, Continuous, Beau Allen M.D., Last Rate: 50 mL/hr at 01/26/21 2341, New Bag at 01/26/21 2341  •  hydrocortisone sodium succinate PF (SOLU-CORTEF) 100 MG  injection 25 mg, 25 mg, Intravenous, Q8HRS, Beau Allen M.D., 25 mg at 01/27/21 0616  •  heparin injection 5,000 Units, 5,000 Units, Subcutaneous, Q8HRS, Beau Allen M.D.  •  amitriptyline (ELAVIL) tablet 20 mg, 20 mg, Oral, Q EVENING, Felipe George M.D.      Fluids    Intake/Output Summary (Last 24 hours) at 1/27/2021 1235  Last data filed at 1/27/2021 0957  Gross per 24 hour   Intake 2060 ml   Output 320 ml   Net 1740 ml       Laboratory  Recent Labs     01/26/21  1706 01/27/21  0330   WBC 6.8 8.0   RBC 3.95* 4.24   HEMOGLOBIN 12.2 13.2   HEMATOCRIT 38.7 41.4   MCV 98.0* 97.6   MCH 30.9 31.1   MCHC 31.5* 31.9*   RDW 58.3* 58.8*   PLATELETCT 262 299   MPV 10.2 10.0     Recent Labs     01/26/21  1706 01/27/21  0330   SODIUM 128* 133*   POTASSIUM 4.3 4.4   CHLORIDE 95* 100   CO2 20 21   GLUCOSE 101* 112*   BUN 31* 26*   CREATININE 2.50* 1.76*   CALCIUM 8.4* 8.8     Recent Labs     01/26/21  1706 01/27/21  1056   APTT 23.4*  --    INR 0.97 0.92               Imaging  DX-ANKLE 2- VIEWS RIGHT   Final Result      Intraoperative fluoroscopic spot images as described above.      DX-PORTABLE FLUOROSCOPY < 1 HOUR Is the patient pregnant? No   Final Result      Portable fluoroscopy utilized for 6 seconds.         INTERPRETING LOCATION: [address] [place] [address], [id number]      US-RENAL   Final Result      Normal renal ultrasound.      DX-ANKLE 3+ VIEWS LEFT   Final Result      1.  No acute left ankle fracture or dislocation.      2.  ORIF changes involving the distal fibula and tibia.      DX-TIBIA AND FIBULA LEFT   Final Result      1.  Acute appearing oblique nondisplaced fracture of the proximal left fibular metaphysis.      2.  No acute tibial fracture identified.      3.  ORIF hardware in the distal left fibula and distal left tibia.      DX-CHEST-PORTABLE (1 VIEW)   Final Result      No acute cardiac or pulmonary abnormalities are identified.      DX-TIBIA AND FIBULA RIGHT   Final Result      Distal tibia and fibular  fractures.                  INTERPRETING LOCATION:  [address] [place] [address] [place], [id number]      DX-ANKLE 3+ VIEWS RIGHT   Final Result      Satisfactory alignment of bimalleolar fracture dislocation of the right ankle following closed reduction and splint application.      DX-ANKLE 2- VIEWS RIGHT   Final Result      1.  Satisfactory relocation of the right ankle.      2.  Acute bimalleolar fracture of the right ankle.      DX-ANKLE 2- VIEWS RIGHT   Final Result      Acute bimalleolar fracture dislocation of the right ankle.           Assessment/Plan  Open right ankle fracture  Assessment & Plan  Patient has no medical contraindications for open right ankle fracture repair  Perioperative antibiotic gentamicin (history of allergy to penicillins and cephalosporins)  Perioperative steroids due to chronic use  Pain control  S/p ORIF, cleared for TTWB, Home if cleared by PT.    PT OT evaluation afterwards    Current chronic use of systemic steroids  Assessment & Plan  0.5 mg dexamethasone daily, unclear indication.    Will order perioperative hydrocortisone to avoid acute adrenal insufficiency    Hyponatremia  Assessment & Plan  IV fluids  Repeat sodium level  Check TSH, cortisol    Acute kidney injury (HCC)  Assessment & Plan  Possibly secondary to hypovolemia  Will hold Lasix, losartan, NSAID  IV fluids  -Bladder scan  -Urine electrolytes  -Renal ultrasound  -Urinalysis  -Monitor in and out  -Repeat BMP  Avoid nephrotoxins; adjust medications doses           VTE prophylaxis: Heparin SQ

## 2021-01-27 NOTE — ANESTHESIA POSTPROCEDURE EVALUATION
Patient: Donna Isaac    Procedure Summary     Date: 01/27/21 Room / Location: [place] [id number] / SURGERY [facility]    Anesthesia Start: 0805 Anesthesia Stop: 0917    Procedure: ORIF, ANKLE (Right Ankle) Diagnosis: (Right ankle fracture)    Surgeons: William Srinivasan M.D. Responsible Provider: Martha Gomez M.D.    Anesthesia Type: general ASA Status: 2          Final Anesthesia Type: general  Last vitals  BP   Blood Pressure: (P) 137/86    Temp   (P) 36.2 °C (97.1 °F)    Pulse   Pulse: 80   Resp   (P) 18    SpO2   95 %      Anesthesia Post Evaluation    Patient location during evaluation: PACU  Patient participation: complete - patient participated  Level of consciousness: awake and alert    Airway patency: patent  Anesthetic complications: no  Cardiovascular status: hemodynamically stable  Respiratory status: acceptable  Hydration status: euvolemic    PONV: none    patient able to participate, but full recovery from regional anesthesia has not occurred and is not expected within the stipulated timeframe for the completion of the evaluation       Nurse Pain Score: 0 (NPRS)

## 2021-01-27 NOTE — CARE PLAN
Problem: Pain Management  Goal: Pain level will decrease to patient's comfort goal  Outcome: PROGRESSING AS EXPECTED  Intervention: Follow pain managment plan developed in collaboration with patient and Interdisciplinary Team  Note: Pt denies pain currently.      Problem: Communication  Goal: The ability to communicate needs accurately and effectively will improve  Outcome: PROGRESSING AS EXPECTED  Intervention: Educate patient and significant other/support system about the plan of care, procedures, treatments, medications and allow for questions  Note: Plan of Care discussed, all questions answered. Pt effectively communicates needs to staff.       Problem: Risk for Impaired Mobility  Goal: Mobilization  Outcome: PROGRESSING AS EXPECTED  Intervention: Maintain proper weight bearing status  Note: TTWB, pt to work with therapies prior to DC.

## 2021-01-27 NOTE — OP REPORT
DATE OF SERVICE:  01/27/2021     PREOPERATIVE DIAGNOSES:  1.  Grade 2 Open Right bimalleolar ankle fracture.  2.  Right ankle syndesmotic disruption.     POSTOPERATIVE DIAGNOSES:  1.  Grade 2 Open Right bimalleolar ankle fracture.  2.  Right ankle syndesmotic disruption.     PROCEDURES PERFORMED:  1.  Open reduction internal fixation right bimalleolar ankle fracture.  2.  Irrigation and debridement, open fracture.  3.  Open reduction internal fixation of syndesmosis.     SURGEON:  William Srinivasan MD     ASSISTANT:  None.     ESTIMATED BLOOD LOSS:  None.     INDICATIONS:  This is a 55-year-old female who has had a history of polytrauma   with bilateral lower extremity open reduction internal fixation procedures   and now has a bimalleolar ankle fracture with syndesmotic disruption.  Risks   and benefits of operative fixation were discussed, which include but not   limited to bleeding, infection, neurovascular damage, pain, stiffness,   malunion, nonunion, DVT, PE, MI, stroke and death.  They understand all these   risks and wished to proceed.     DESCRIPTION OF PROCEDURE:  The patient was sedated with LMA anesthesia and   administered perioperative antibiotics.  Her right leg was prepped and draped   in usual sterile fashion.  Her open fracture medial wound was debrided of   skin, subcutaneous tissue, underlying muscle and bone in an excisional fashion   with a knife and rongeur, irrigated with copious amounts of normal saline   solution.  The open laceration was extended distally and the comminuted medial   malleolus fracture was reduced in anatomic position and held with 2 [facility]   cannulated screws.  The wound was then closed without tension using 3-0 nylon   interrupted sutures.  Attention was then turned to the lateral malleolus where   a standard lateral approach to the tibia was performed with care taken to   avoid all neurovascular structures.  The comminuted fracture was reduced and   bridged with [facility]  distal fibular locking plate with a combination of locking   and nonlocking fixation.  All screws were checked for appropriate length and   out of the joint.  Syndesmosis was stressed and found to have opened and as a   result, it was clamped and a single syndesmotic screw was placed.  Wounds were   irrigated, closed in layers.  Sterile dressing was applied.  Splint was   applied.  The patient tolerated the procedure well.     POSTOPERATIVE PLAN:  The patient will be discharged home.  Toe touch   weightbearing.  Follow up in two weeks' time for wound check and conversion to   a walking boot.        ______________________________  YADIRA FRANCISCO MD PLA/KEN/SANDRA    DD:  01/27/2021 09:11  DT:  01/27/2021 09:30    Job#:  [id number]

## 2021-01-27 NOTE — ANESTHESIA PROCEDURE NOTES
Airway    Date/Time: 1/27/2021 8:16 AM  Performed by: Martha Gomez M.D.  Authorized by: Martha Gomez M.D.     Location:  OR  Urgency:  Elective  Indications for Airway Management:  Anesthesia      Spontaneous Ventilation: absent    Sedation Level:  Deep  Preoxygenated: Yes    Mask Difficulty Assessment:  0 - not attempted  Final Airway Type:  Supraglottic airway  Final Supraglottic Airway:  Standard LMA    SGA Size:  4  Number of Attempts at Approach:  1

## 2021-01-27 NOTE — THERAPY
Physical Therapy   Initial Evaluation     Patient Name: Donna Isaac  Age:  55 y.o., Sex:  female  Medical Record #: [id number]  Today's Date: 1/27/2021     Precautions: Fall Risk, Toe Touch Weight Bearing Right Lower Extremity(s/p R ankle fracture)    Assessment  Pt presents to PT after fall with RLE ankle fracture that is now s/p surgery. She is able to demonstrate bed mobility and sit<>stands with SBA. However she is currently unable to effectively transfer or initiate gait/sidestepping 2' to LLE ankle pain (appears likely sprain/bruised). She is well versed in maintaining NWB RLE as she has performed this from prior co-morbidities and actually has fair setup at home and appropriate equipment (including knee scooter) and assist. Anticipate once pt able to effectively demonstrate stand pivot transfer on LLE she will be capable of dc to home, though current pain at LLE ankle is limiting ability to perform without increased assist. See below for details/recs. Spoke with nursing re: dc concerns and need for appropriate pain management for dc planning with re: mobility.     Plan    Recommend Physical Therapy 3 times per week until therapy goals are met for the following treatments:  Community Re-integration, Equipment, Gait Training, Manual Therapy, Neuro Re-Education / Balance, Self Care/Home Evaluation, Stair Training, Therapeutic Activities and Therapeutic Exercises    DC Equipment Recommendations: Unable to determine at this time, Front-Wheel Walker  Discharge Recommendations: Recommend home health for continued physical therapy services(anticipated once pain managed to allow transfers)        01/27/21 1243   Prior Living Situation   Prior Services None   Housing / Facility 1 Story House   Steps Into Home 0   Steps In Home 0   Bathroom Set up Walk In Shower;Shower Chair;Grab Bars   Equipment Owned   (knee scooter; reports home is setup well )   Lives with - Patient's Self Care Capacity Significant Other  "  Comments reports SO able to assist; pt reports prior injuries to RLE ankle and NWB in past for 10 wks; has knee scooter and aware of mechanics for maintaining NWB RLE; de[place] LLE ankle pain is limiting transfers and mobility    Prior Level of Functional Mobility   Bed Mobility Independent   Transfer Status Independent   Ambulation Independent   Distance Ambulation (Feet)   (community)   Assistive Devices Used None   Stairs Independent   Comments I with IADls and ADls prior   History of Falls   History of Falls Yes   Date of Last Fall   (fall PTA appears 2' to abnormal hemodynamics/near syncope)   Cognition    Cognition / Consciousness WDL   Comments very pleasantand cooperative   Passive ROM Lower Body   Passive ROM Lower Body X   Comments RLE limited 2' to ankle splint, otherwise WFL; LLE knee and hip WFL; however grimaces/guarding in pain with ER movements at LLE ankle (pt reports \"rolling\" ankle during fall   Active ROM Lower Body    Active ROM Lower Body  X   Comments as above; RLE WFL as availabe; able to wiggle toes; LLE hip and knee WFL; ankle dorsiflexion and inversion/eversion limited 2' to pain as well   Strength Lower Body   Lower Body Strength  X   Comments RLE WFL at hip and knee; ankle limited 2' to splint, able to wiggle toes; LLE hip and knee WFL; ankle grossly 3-/5 and breaks to resistance 2' to pain   Sensation Lower Body   Lower Extremity Sensation   WDL   Neurological Concerns   Neurological Concerns No   Balance Assessment   Sitting Balance (Static) Good   Sitting Balance (Dynamic) Good   Standing Balance (Static) Poor +   Standing Balance (Dynamic) Poor   Weight Shift Sitting Good   Weight Shift Standing Poor   Comments static sitting EOb with no richard LOB; standing statically at FWW iwth SBA; unable to effectively maintain and/or weightshift 2' to pain at LLE ankle with WB and pt unable to effectively compensate currently; anticipate once pain resolves, will progress quickly  "   Gait Analysis   Gait Level Of Assist Unable to Participate   Weight Bearing Status TTWB RLE   Bed Mobility    Supine to Sit Supervised   Sit to Supine Supervised   Scooting Supervised   Comments HOB flat; comes to EOB without assist   Functional Mobility   Sit to Stand   (SBA)   Bed, Chair, Wheelchair Transfer   (2' to current pain with WB; unable to effectively pivot)   Mobility bed mobility, EOB, sit<>stands, attempt to initiate stand pivot and sidestepping *(unable 2' to current pain at LLE ankle with WB/resisted movement)   How much difficulty does the patient currently have...   Turning over in bed (including adjusting bedclothes, sheets and blankets)? 3   Sitting down on and standing up from a chair with arms (e.g., wheelchair, bedside commode, etc.) 3   Moving from lying on back to sitting on the side of the bed? 3   How much help from another person does the patient currently need...   Moving to and from a bed to a chair (including a wheelchair)? 2   Need to walk in a hospital room? 2   Climbing 3-5 steps with a railing? 1   6 clicks Mobility Score 14   Activity Tolerance   Sitting in Chair NT   Sitting Edge of Bed functional   Standing ~1-2 mins total with breaks (2' to pain)   Comments pain at LLE ankle limiting   Edema / Skin Assessment   Edema / Skin  X   Comments edema and bruising noted at LLE ankle as well; RLE ankle splinted   Patient / Family Goals    Patient / Family Goal #1 to return home   Short Term Goals    Short Term Goal # 1 Pt will be able to perform sit<>stand/transfers with FWW with Spv in 6tx to promote fnx progression to I    Short Term Goal # 2 Pt will be able to ambulate 15ft with FWW with SPv in 6tx to promote increased OOB activiyt    Education Group   Education Provided Role of Physical Therapist;Use of Assistive Device;Weight Bearing Status;Weight Bearing Precautions   Role of Physical Therapist Patient Response Patient;Acceptance;Explanation;Verbal Demonstration   Use of  Assistive Device Patient Response Patient;Acceptance;Explanation;Verbal Demonstration;Action Demonstration;Reinforcement Needed   Weight Bearing Status Patient Response Patient;Acceptance;Explanation;Demonstration;Teach Back;Verbal Demonstration;Action Demonstration   Additional Comments education re: current deficits andinability to effectively compensate; PT POC and dc planing

## 2021-01-27 NOTE — PROGRESS NOTES
Pt arrived to unit, reports mild pain to right lower extremity that is tolerable at this time rr even and unlabored, pt stable with 02 sat 99% on room air, no s/s of distress, call light in reach, bed locked and low, will continue to monitor.

## 2021-01-27 NOTE — ANESTHESIA TIME REPORT
Anesthesia Start and Stop Event Times     Date Time Event    1/27/2021 0803 Ready for Procedure     0805 Anesthesia Start     0917 Anesthesia Stop        Responsible Staff  01/27/21    Name Role Begin End    Martha Gomez M.D. Anesth 0805 0917        Preop Diagnosis (Free Text):  Pre-op Diagnosis     Right ankle fracture        Preop Diagnosis (Codes):    Post op Diagnosis  Open right ankle fracture      Premium Reason  Non-Premium    Comments:

## 2021-01-27 NOTE — ASSESSMENT & PLAN NOTE
0.5 mg dexamethasone daily, unclear indication.    Will order perioperative hydrocortisone to avoid acute adrenal insufficiency

## 2021-01-27 NOTE — ASSESSMENT & PLAN NOTE
Patient has no medical contraindications for open right ankle fracture repair  Perioperative antibiotic gentamicin (history of allergy to penicillins and cephalosporins)  - Gustilo type I, perhaps type II fracture, either way ABX okay to stop s/psurgical coverage.    Perioperative steroids due to chronic use  Pain control  S/p ORIF, cleared for TTWB, Home if cleared by PT.    PT OT evaluation afterwards

## 2021-01-27 NOTE — PROGRESS NOTES
2 RN Skin Check    skin check complete.   Devices in place: SCDs.  Skin assessed under devices: yes.  Confirmed pressure ulcers found on: none.  New potential pressure ulcers noted on none. Wound consult placed N/A.  The following interventions in place Pillows.    New admit skin check completed. Pt has bruising to left upper extremity near shoulder, rash to groin with scabs, and cracked lips, skin is otherwise intact. Skin is dry, feet are dry and flaky. No pressure wounds. Bottom intact.

## 2021-01-27 NOTE — ANESTHESIA PREPROCEDURE EVALUATION
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 No problems with anesthesia in the past, just many allergies to pain meds. Also increased creatinine.     Relevant Problems      (+) Acute kidney injury (HCC)       Physical Exam    Airway   Mallampati: II  TM distance: >3 FB  Neck ROM: full       Cardiovascular - normal exam  Rhythm: regular  Rate: normal  (-) murmur     Dental - normal exam           Pulmonary - normal exam  Breath sounds clear to auscultation     Abdominal    Neurological - normal exam                 Anesthesia Plan    ASA 2       Plan - general and peripheral nerve block     Peripheral nerve block will be post-op pain control  Airway plan will be LMA        Induction: intravenous    Postoperative Plan: Postoperative administration of opioids is intended.    Pertinent diagnostic labs and testing reviewed    Informed Consent:    Anesthetic plan and risks discussed with patient.

## 2021-01-27 NOTE — CONSULTS
DATE OF SERVICE:  01/26/2021     ORTHOPEDIC CONSULTATION     CHIEF COMPLAINT:  Fall.     HISTORY OF PRESENT ILLNESS:  The patient is a 55-year-old female who has had   multiple recent foot surgery.  She also had multiple problems with sinus   infections and says she has sinus problems with MRSA.  She is allergic to   multiple antibiotics and has limited choices and has been referred to ID for   her sinus problems.  Unfortunately, she was getting out of a recliner when she   fell and sustained an injury and a pop to her ankle.  She had sudden onset of   pain and deformity.  She is able to kind of self reduce it and then came into   the emergency room and had an open lesion on the medial side of her ankle.    The ortho PA, Odell, reduced it, put sterile dressings on it and she was given   gentamicin and splinted.  She has had multiple recent foot surgeries by Dr. Mercedes at [facility].     PAST MEDICAL HISTORY:  Per the [facility]     SOCIAL HISTORY:  The patient has family at the bedside.  She does not actively   drink or smoke.     PHYSICAL EXAMINATION:  Reveals no real pain on the right side.  She is   splinted.  Her toes are neurovascularly intact.  The left leg has a little bit   of mid calf pain on the lateral side.  No obvious deformity or swelling.  She   is grossly neurologically intact.     DIAGNOSTIC STUDIES:  X-rays of her right ankle demonstrates a dislocation   posteriorly with a bimalleolar fracture.  Post-reduction demonstrates to be in   good position.  She has numerous hardware in her foot without any clear   disruption.     IMPRESSION:  Right open bimalleolar ankle fracture.     PLAN:  At this point she just ate a little bit ago, she ate at lunch.  We are   going to put her on antibiotics.  Sterile dressings and likely fix her first   thing in the morning.  She was explained the risks, benefits, alternatives,   procedure and recovery in detail.  This will likely be passed off to the    trauma team.  The left leg, we will get some x-rays to make sure there are no   fractures there.        ______________________________  MD MARGAUX AMADOR/TERA/VENTURA    DD:  01/26/2021 18:13  DT:  01/26/2021 19:01    Job#:  [id number]

## 2021-01-27 NOTE — PROCEDURES
"Patient seen per request of Dr Oakley for splinting of right leg.  The indications, risks, benefits, and alternatives of splint application were presented to the patient.  Understanding this the patient wished to proceed with splint application.  The extremity was first wrapped with soft roll then a 5x3\" posterior slab with stirrups mediglass splint was applied and held in place using ace wraps.  The patient was DNVI with < 2 sec cap refill before and after splint application.  The patient reported good fit and comfort of splint after application.      "

## 2021-01-27 NOTE — ED NOTES
Pt became lightheaded at home and fell to the ground.  Right ankle open deformity upon arrival.  Denies LOC.  Denies midline neck/back pain.  Denies hitting head.

## 2021-01-27 NOTE — OR NURSING
Awake, alert and sipping water.  Vitals stable, denies pain.  On monitors with alarms audible.    Called  with update.

## 2021-01-28 VITALS
OXYGEN SATURATION: 96 % | HEIGHT: 64 IN | WEIGHT: 210 LBS | RESPIRATION RATE: 16 BRPM | DIASTOLIC BLOOD PRESSURE: 114 MMHG | SYSTOLIC BLOOD PRESSURE: 174 MMHG | HEART RATE: 91 BPM | TEMPERATURE: 98.3 F | BODY MASS INDEX: 35.85 KG/M2

## 2021-01-28 PROBLEM — S82.891B OPEN RIGHT ANKLE FRACTURE: Status: RESOLVED | Noted: 2021-01-26 | Resolved: 2021-01-28

## 2021-01-28 PROBLEM — N17.9 ACUTE KIDNEY INJURY (HCC): Status: RESOLVED | Noted: 2021-01-26 | Resolved: 2021-01-28

## 2021-01-28 PROBLEM — Z79.52 CURRENT CHRONIC USE OF SYSTEMIC STEROIDS: Status: RESOLVED | Noted: 2021-01-26 | Resolved: 2021-01-28

## 2021-01-28 PROBLEM — E87.1 HYPONATREMIA: Status: RESOLVED | Noted: 2021-01-26 | Resolved: 2021-01-28

## 2021-01-28 LAB
ANION GAP SERPL CALC-SCNC: 8 MMOL/L (ref 7–16)
BUN SERPL-MCNC: 21 MG/DL (ref 8–22)
CALCIUM SERPL-MCNC: 8.8 MG/DL (ref 8.5–10.5)
CHLORIDE SERPL-SCNC: 104 MMOL/L (ref 96–112)
CO2 SERPL-SCNC: 23 MMOL/L (ref 20–33)
CREAT SERPL-MCNC: 1.21 MG/DL (ref 0.5–1.4)
ERYTHROCYTE [DISTWIDTH] IN BLOOD BY AUTOMATED COUNT: 59.7 FL (ref 35.9–50)
GLUCOSE SERPL-MCNC: 132 MG/DL (ref 65–99)
HCT VFR BLD AUTO: 37.6 % (ref 37–47)
HGB BLD-MCNC: 11.9 G/DL (ref 12–16)
MCH RBC QN AUTO: 31.3 PG (ref 27–33)
MCHC RBC AUTO-ENTMCNC: 31.6 G/DL (ref 33.6–35)
MCV RBC AUTO: 98.9 FL (ref 81.4–97.8)
PLATELET # BLD AUTO: 238 K/UL (ref 164–446)
PMV BLD AUTO: 10.2 FL (ref 9–12.9)
POTASSIUM SERPL-SCNC: 4.8 MMOL/L (ref 3.6–5.5)
RBC # BLD AUTO: 3.8 M/UL (ref 4.2–5.4)
SODIUM SERPL-SCNC: 135 MMOL/L (ref 135–145)
WBC # BLD AUTO: 8.4 K/UL (ref 4.8–10.8)

## 2021-01-28 PROCEDURE — 80048 BASIC METABOLIC PNL TOTAL CA: CPT

## 2021-01-28 PROCEDURE — 99232 SBSQ HOSP IP/OBS MODERATE 35: CPT | Performed by: HOSPITALIST

## 2021-01-28 PROCEDURE — A9270 NON-COVERED ITEM OR SERVICE: HCPCS | Performed by: INTERNAL MEDICINE

## 2021-01-28 PROCEDURE — 36415 COLL VENOUS BLD VENIPUNCTURE: CPT

## 2021-01-28 PROCEDURE — 700102 HCHG RX REV CODE 250 W/ 637 OVERRIDE(OP): Performed by: HOSPITALIST

## 2021-01-28 PROCEDURE — 700102 HCHG RX REV CODE 250 W/ 637 OVERRIDE(OP): Performed by: INTERNAL MEDICINE

## 2021-01-28 PROCEDURE — A9270 NON-COVERED ITEM OR SERVICE: HCPCS | Performed by: HOSPITALIST

## 2021-01-28 PROCEDURE — 700111 HCHG RX REV CODE 636 W/ 250 OVERRIDE (IP): Performed by: INTERNAL MEDICINE

## 2021-01-28 PROCEDURE — 97530 THERAPEUTIC ACTIVITIES: CPT

## 2021-01-28 PROCEDURE — 85027 COMPLETE CBC AUTOMATED: CPT

## 2021-01-28 RX ORDER — HYDROCODONE BITARTRATE AND ACETAMINOPHEN 5; 325 MG/1; MG/1
1-2 TABLET ORAL EVERY 4 HOURS PRN
Qty: 20 TAB | Refills: 0 | Status: SHIPPED | OUTPATIENT
Start: 2021-01-28 | End: 2021-02-11

## 2021-01-28 RX ADMIN — TIZANIDINE 4 MG: 4 TABLET ORAL at 08:33

## 2021-01-28 RX ADMIN — OMEPRAZOLE 20 MG: 20 CAPSULE, DELAYED RELEASE ORAL at 05:06

## 2021-01-28 RX ADMIN — DOCUSATE SODIUM 50 MG AND SENNOSIDES 8.6 MG 2 TABLET: 8.6; 5 TABLET, FILM COATED ORAL at 05:06

## 2021-01-28 RX ADMIN — Medication 2000 UNITS: at 05:06

## 2021-01-28 RX ADMIN — HYDRALAZINE HYDROCHLORIDE 25 MG: 50 TABLET, FILM COATED ORAL at 08:31

## 2021-01-28 RX ADMIN — HYDROCORTISONE SODIUM SUCCINATE 25 MG: 100 INJECTION, POWDER, FOR SOLUTION INTRAMUSCULAR; INTRAVENOUS at 05:00

## 2021-01-28 RX ADMIN — FENTANYL CITRATE 25 MCG: 50 INJECTION, SOLUTION INTRAMUSCULAR; INTRAVENOUS at 04:55

## 2021-01-28 RX ADMIN — GABAPENTIN 300 MG: 300 CAPSULE ORAL at 08:33

## 2021-01-28 RX ADMIN — CARVEDILOL 25 MG: 25 TABLET, FILM COATED ORAL at 08:31

## 2021-01-28 RX ADMIN — HEPARIN SODIUM 5000 UNITS: 5000 INJECTION, SOLUTION INTRAVENOUS; SUBCUTANEOUS at 05:06

## 2021-01-28 RX ADMIN — LEVOTHYROXINE SODIUM 75 MCG: 0.07 TABLET ORAL at 05:07

## 2021-01-28 ASSESSMENT — COGNITIVE AND FUNCTIONAL STATUS - GENERAL
SUGGESTED CMS G CODE MODIFIER MOBILITY: CK
MOBILITY SCORE: 15
CLIMB 3 TO 5 STEPS WITH RAILING: TOTAL
TURNING FROM BACK TO SIDE WHILE IN FLAT BAD: A LITTLE
MOVING FROM LYING ON BACK TO SITTING ON SIDE OF FLAT BED: A LITTLE
WALKING IN HOSPITAL ROOM: A LOT
MOVING TO AND FROM BED TO CHAIR: A LITTLE
STANDING UP FROM CHAIR USING ARMS: A LITTLE

## 2021-01-28 ASSESSMENT — PAIN DESCRIPTION - PAIN TYPE
TYPE: ACUTE PAIN;SURGICAL PAIN
TYPE: ACUTE PAIN;SURGICAL PAIN

## 2021-01-28 ASSESSMENT — GAIT ASSESSMENTS: GAIT LEVEL OF ASSIST: UNABLE TO PARTICIPATE

## 2021-01-28 NOTE — PROGRESS NOTES
[facility] Medicine Daily Progress Note    Date of Service  1/28/2021    Chief Complaint  55 y.o. female admitted 1/26/2021 with open ankle fracture    Hospital Course  Patient underwent successful ORIF with orthopedic surgery on hospital day 1.  Procedure was uncomplicated.  Patient will be seen by physical therapy this afternoon, placement recommendations thereafter.  She is to be toe-touch weightbearing to the right lower extremity for the from in 6 weeks with outpatient follow-up with orthopedic surgery thereafter.    Interval Problem Update  NAOE.  Patient tells me that she is supposed to receive 12 weeks of parenteral antibiotics for a sinus infection.  She is asking if this can be initiated as an inpatient.  This is deferred to her outpatient physicians.    Consultants/Specialty  Orthho    Code Status  Full Code    Disposition  Pending HH & DME.  Likely home in AM.      Review of Systems  All systems reviewed and negative except as noted per above.    Physical Exam  Temp:  [36.6 °C (97.8 °F)-36.8 °C (98.3 °F)] 36.8 °C (98.3 °F)  Pulse:  [86-91] 91  Resp:  [16-17] 16  BP: (112-174)/() 174/114  SpO2:  [93 %-97 %] 96 %    General: No acute distress  HEENT atraumatic, normocephalic, pupils equal round reactive to light  Neck: No JVD  Chest: Respirations are unlabored  Cardiac: Physiologic S1 and S2  Abdomen: Soft, nontender, nondistended  Extremities: Without clubbing, cyanosis or edema.  Bandages are clean dry and intact the right lower extremity.  Neuro: Cranial nerves II through XII are grossly intact.  Psych: No anxiety, judgement intact.          Current Facility-Administered Medications:   •  hydrALAZINE (APRESOLINE) tablet 25 mg, 25 mg, Oral, Q6HRS PRN, Alber Guerra M.D., 25 mg at 01/28/21 0831  •  senna-docusate (PERICOLACE or SENOKOT S) 8.6-50 MG per tablet 2 Tab, 2 Tab, Oral, BID, 2 Tab at 01/28/21 0506 **AND** polyethylene glycol/lytes (MIRALAX) PACKET 1 Packet, 1 Packet, Oral, QDAY PRN **AND**  [DISCONTINUED] magnesium hydroxide (MILK OF MAGNESIA) suspension 30 mL, 30 mL, Oral, QDAY PRN **AND** bisacodyl (DULCOLAX) suppository 10 mg, 10 mg, Rectal, QDAY PRN, Beau Allen M.D.  •  ondansetron (ZOFRAN) syringe/vial injection 4 mg, 4 mg, Intravenous, Q4HRS PRN, Beau Allen M.D.  •  ondansetron (ZOFRAN ODT) dispertab 4 mg, 4 mg, Oral, Q4HRS PRN, Beau Allen M.D.  •  promethazine (PHENERGAN) suppository 12.5-25 mg, 12.5-25 mg, Rectal, Q4HRS PRN, Beau Allen M.D.  •  fentaNYL (SUBLIMAZE) injection 25-50 mcg, 25-50 mcg, Intravenous, Q HOUR PRN, Beau Allen M.D., 25 mcg at 01/28/21 0455  •  carvedilol (COREG) tablet 25 mg, 25 mg, Oral, BID WITH MEALS, Beau Allen M.D., 25 mg at 01/28/21 0831  •  vitamin D (cholecalciferol) tablet 2,000 Units, 2,000 Units, Oral, DAILY, Beau Allen M.D., 2,000 Units at 01/28/21 0506  •  doxazosin (CARDURA) tablet 2 mg, 2 mg, Oral, QHS, Beau Allen M.D., 2 mg at 01/27/21 2103  •  DULoxetine (CYMBALTA) capsule 60 mg, 60 mg, Oral, QHS, Beau Allen M.D., 60 mg at 01/27/21 2100  •  famotidine (PEPCID) tablet 20 mg, 20 mg, Oral, QHS, Beau Allen M.D., 20 mg at 01/27/21 2102  •  gabapentin (NEURONTIN) capsule 300 mg, 300 mg, Oral, BID, Beau Allen M.D., 300 mg at 01/28/21 0833  •  levothyroxine (SYNTHROID) tablet 75 mcg, 75 mcg, Oral, AM ES, Beau Allen M.D., 75 mcg at 01/28/21 0507  •  liothyronine (CYTOMEL) tablet 25 mcg, 25 mcg, Oral, QHS, Beau Allen M.D., 25 mcg at 01/27/21 2104  •  montelukast (SINGULAIR) tablet 10 mg, 10 mg, Oral, QHS, Beau Allen M.D., 10 mg at 01/27/21 2103  •  omeprazole (PRILOSEC) capsule 20 mg, 20 mg, Oral, DAILY, Beau Allen M.D., 20 mg at 01/28/21 0506  •  tizanidine (ZANAFLEX) tablet 4 mg, 4 mg, Oral, Q6HRS PRN, Beau Allen M.D., 4 mg at 01/28/21 0833  •  NS infusion, , Intravenous, Continuous, Beau Allen M.D., Last Rate: 50 mL/hr at 01/26/21 2341, New Bag  at 01/26/21 2341  •  hydrocortisone sodium succinate PF (SOLU-CORTEF) 100 MG injection 25 mg, 25 mg, Intravenous, Q8HRS, Beau Allen M.D., 25 mg at 01/28/21 0500  •  heparin injection 5,000 Units, 5,000 Units, Subcutaneous, Q8HRS, Beau Allen M.D., 5,000 Units at 01/28/21 0506  •  amitriptyline (ELAVIL) tablet 20 mg, 20 mg, Oral, Q EVENING, Felipe George M.D., 20 mg at 01/27/21 1752      Fluids    Intake/Output Summary (Last 24 hours) at 1/28/2021 1508  Last data filed at 1/28/2021 0900  Gross per 24 hour   Intake 300 ml   Output 1650 ml   Net -1350 ml       Laboratory  Recent Labs     01/26/21  1706 01/27/21  0330 01/28/21  0211   WBC 6.8 8.0 8.4   RBC 3.95* 4.24 3.80*   HEMOGLOBIN 12.2 13.2 11.9*   HEMATOCRIT 38.7 41.4 37.6   MCV 98.0* 97.6 98.9*   MCH 30.9 31.1 31.3   MCHC 31.5* 31.9* 31.6*   RDW 58.3* 58.8* 59.7*   PLATELETCT 262 299 238   MPV 10.2 10.0 10.2     Recent Labs     01/26/21  1706 01/27/21  0330 01/28/21  0211   SODIUM 128* 133* 135   POTASSIUM 4.3 4.4 4.8   CHLORIDE 95* 100 104   CO2 20 21 23   GLUCOSE 101* 112* 132*   BUN 31* 26* 21   CREATININE 2.50* 1.76* 1.21   CALCIUM 8.4* 8.8 8.8     Recent Labs     01/26/21  1706 01/27/21  1056   APTT 23.4*  --    INR 0.97 0.92               Imaging  DX-ANKLE 2- VIEWS RIGHT   Final Result      Intraoperative fluoroscopic spot images as described above.      DX-PORTABLE FLUOROSCOPY < 1 HOUR Is the patient pregnant? No   Final Result      Portable fluoroscopy utilized for 6 seconds.         INTERPRETING LOCATION: [address] [place] [address], [facility], [id number]      US-RENAL   Final Result      Normal renal ultrasound.      DX-ANKLE 3+ VIEWS LEFT   Final Result      1.  No acute left ankle fracture or dislocation.      2.  ORIF changes involving the distal fibula and tibia.      DX-TIBIA AND FIBULA LEFT   Final Result      1.  Acute appearing oblique nondisplaced fracture of the proximal left fibular metaphysis.      2.  No acute tibial fracture identified.      3.   ORIF hardware in the distal left fibula and distal left tibia.      DX-CHEST-PORTABLE (1 VIEW)   Final Result      No acute cardiac or pulmonary abnormalities are identified.      DX-TIBIA AND FIBULA RIGHT   Final Result      Distal tibia and fibular fractures.                  INTERPRETING LOCATION:  [facility], [place], [address]      DX-ANKLE 3+ VIEWS RIGHT   Final Result      Satisfactory alignment of bimalleolar fracture dislocation of the right ankle following closed reduction and splint application.      DX-ANKLE 2- VIEWS RIGHT   Final Result      1.  Satisfactory relocation of the right ankle.      2.  Acute bimalleolar fracture of the right ankle.      DX-ANKLE 2- VIEWS RIGHT   Final Result      Acute bimalleolar fracture dislocation of the right ankle.           Assessment/Plan  No new Assessment & Plan notes have been filed under this hospital service since the last note was generated.  Service: Hospital Medicine   Open right ankle fracture  Assessment & Plan  Patient has no medical contraindications for open right ankle fracture repair  Perioperative antibiotic gentamicin (history of allergy to penicillins and cephalosporins)  Perioperative steroids due to chronic use  Pain control  S/p ORIF, cleared for TTWB, Home if cleared by PT.    PT OT evaluation afterwards     Current chronic use of systemic steroids  Assessment & Plan  0.5 mg dexamethasone daily, unclear indication.    Will order perioperative hydrocortisone to avoid acute adrenal insufficiency     Hyponatremia  Assessment & Plan  IV fluids  Repeat sodium level  Check TSH, cortisol     Acute kidney injury (HCC)  Assessment & Plan  Possibly secondary to hypovolemia  Will hold Lasix, losartan, NSAID  IV fluids  -Bladder scan  -Urine electrolytes  -Renal ultrasound  -Urinalysis  -Monitor in and out  -Repeat BMP  Avoid nephrotoxins; adjust medications doses    VTE prophylaxis: Heparin SQ

## 2021-01-28 NOTE — NON-PROVIDER
"Admit: 1/26/2021  5:04 PM  Hospital Day: 2    55 y.o. female with multiple allergies and distant history head injury requiring outpatient endocrinology management presents with RIGHT bimalleolar ankle fracture status post ORIF 1/27.    Overnight Events: No acute events overnight.     Endorses pain well managed. Her  visited yesterday and they feel comfortable with bed transfers with WC and shower bed.    ROS:  Positive for: Still no BM, but +flatulence  Negative for: nausea, extreme fatigue, CP, SOB, abd pain, rashes, new muscle or joint pain    Intake/Output Summary (Last 24 hours) at 1/28/2021 0825  Last data filed at 1/28/2021 0200  Gross per 24 hour   Intake 1100 ml   Output 1950 ml   Net -850 ml     Last BM: prior to surgery    Temp:  [36.2 °C (97.1 °F)-36.8 °C (98.3 °F)] 36.8 °C (98.3 °F)  Pulse:  [80-97] 91  Resp:  [15-18] 16  BP: (112-174)/() 174/114  SpO2:  [93 %-97 %] 96 %  BP (!) 174/114   Pulse 91   Temp 36.8 °C (98.3 °F) (Temporal)   Resp 16   Ht 1.626 m (5' 4\")   Wt 95.3 kg (210 lb) Comment: bed scale not functioning  LMP  (LMP Unknown)   SpO2 96%   BMI 36.05 kg/m²     General Appearance: sleeping in bed, easily awakes to sound. Smiling.  HEENT: EOM intact. Symmetric facial features without word slurring.  CardioPulmonary: RRR no murmurs, though distant with body habitus. Vesicular breath sounds with good expansion on encourageent.  Abdomen: +bs, non-tender.   Extremities: cap refill <2 in all four. Right toes hyperemic, but warm.  Skin: intact  Neurologic:   RIGHT: no sensation to light touch or pressure in first two RIGHT toes. No sensation to light touch, but can feel pressure in third toe. No pitting edema above cast.    LEFT: sensation and motor intact, bruising around ankle.       Recent Labs     01/26/21  1706 01/27/21  0330 01/28/21  0211   WBC 6.8 8.0 8.4   RBC 3.95* 4.24 3.80*   HEMOGLOBIN 12.2 13.2 11.9*   HEMATOCRIT 38.7 41.4 37.6   MCV 98.0* 97.6 98.9*   MCH 30.9 31.1 " 31.3   MCHC 31.5* 31.9* 31.6*   RDW 58.3* 58.8* 59.7*   PLATELETCT 262 299 238   MPV 10.2 10.0 10.2     Recent Results (from the past 24 hour(s))   TSH WITH REFLEX TO FT4    Collection Time: 01/27/21 10:56 AM   Result Value Ref Range    TSH 0.994 0.380 - 5.330 uIU/mL   Prothrombin Time    Collection Time: 01/27/21 10:56 AM   Result Value Ref Range    PT 12.7 12.0 - 14.6 sec    INR 0.92 0.87 - 1.13   CREATINE KINASE    Collection Time: 01/27/21 10:56 AM   Result Value Ref Range    CPK Total 53 0 - 154 U/L   Basic Metabolic Panel    Collection Time: 01/28/21  2:11 AM   Result Value Ref Range    Sodium 135 135 - 145 mmol/L    Potassium 4.8 3.6 - 5.5 mmol/L    Chloride 104 96 - 112 mmol/L    Co2 23 20 - 33 mmol/L    Glucose 132 (H) 65 - 99 mg/dL    Bun 21 8 - 22 mg/dL    Creatinine 1.21 0.50 - 1.40 mg/dL    Calcium 8.8 8.5 - 10.5 mg/dL    Anion Gap 8.0 7.0 - 16.0   CBC WITHOUT DIFFERENTIAL    Collection Time: 01/28/21  2:11 AM   Result Value Ref Range    WBC 8.4 4.8 - 10.8 K/uL    RBC 3.80 (L) 4.20 - 5.40 M/uL    Hemoglobin 11.9 (L) 12.0 - 16.0 g/dL    Hematocrit 37.6 37.0 - 47.0 %    MCV 98.9 (H) 81.4 - 97.8 fL    MCH 31.3 27.0 - 33.0 pg    MCHC 31.6 (L) 33.6 - 35.0 g/dL    RDW 59.7 (H) 35.9 - 50.0 fL    Platelet Count 238 164 - 446 K/uL    MPV 10.2 9.0 - 12.9 fL   ESTIMATED GFR    Collection Time: 01/28/21  2:11 AM   Result Value Ref Range    GFR If  56 (A) >60 mL/min/1.73 m 2    GFR If Non  46 (A) >60 mL/min/1.73 m 2       Recent Labs     01/26/21  1706 01/27/21  1056   APTT 23.4*  --    INR 0.97 0.92     Calcium   Date Value Ref Range Status   01/28/2021 8.8 8.5 - 10.5 mg/dL Final     ___________________________________________________  55 y.o. female with multiple allergies and distant history head injury requiring outpatient endocrinology management presents with RIGHT bimalleolar ankle fracture status post ORIF 1/27. Course complicated by loss of sensation in RIGHT first toes,  concerning for local edema vs compartment syndrome.    __________________________Acute ______________________  #RIGHT Loss of sensation: Hallux and 2nd toe (active)  Pt has surgical wrap so unable to assess sensation below the knee to tips of toes. Cannot miss compartment syndrome, which is common in the common peroneal, which eventually branches and innervates webbed space between hallux and second toe with individual anatomic variation. Could also consider local edema in the foot that is locally impinging on sensation in toes.   -consider surgery consult VS unwrap and further assess    #RIGHT bimalleolar Fx (ORIF 1/27)  RIGHT tib-fib fracture s/p ORIF.   -gentamicin OK (see ppx abx allergies)  -pain management reviewed  -PT OK'ed for toe-touch wt bearing  -continue home steroid (see below)     #HTN (essential, stable)  #CHF (chronic)  Chronic medical history, continue home medication.  -carvedilol 25mg PO BID  -hydralazine PRN  -Consider restarting: Losartan 100mg PO Qday and Amiloride 5mg Qday    #Chronic Steroid Use (2/2 head injury, managed by outpt endocrinologist)  #Hypothyroid (chronic)  Asymptomatic for adrenal insufficiency, small increase in glucose post-op day 1. Continue home meds with addition of hydrocortisone to avoid adrenal insufficiency.   -0.5 dexamethasone Qday + perioperative hydrocortisone  -Levothyroxine 75 mcg Qday  -Liothyroxine 25 mcg Qday    ________________________Stable/Chronic ________________________   #Hyponatremia (resolved with IVF)  Likely dehydration and NPO. Pt endorses her thirst and increased volume intake is managed with her endocrinologist. Cortisol and TSH WNL during this admission.   -continue PO nutrition     #MARY LOU (resolving)  On arrival Cr: 2.5. Likely pre-renal, given she is improving with IVF and holding: NSAIDs and diuretics.  -cautiously restart home rx, given hydration status improving after surgery     Diet/IVF: Adult  PPX: Standard Heparin   Bowel: Scheduled Senna  + PRN protocol   Outpt Providers: Madisyn Mccullough M.D.   Code: Full    Carlie Galeano [facility]  Internal Medicine Sub-I  4th Year Medical Student  [facility]  1/28/2021

## 2021-01-28 NOTE — CARE PLAN
Problem: Communication  Goal: The ability to communicate needs accurately and effectively will improve  Outcome: PROGRESSING AS EXPECTED  Intervention: Educate patient and significant other/support system about the plan of care, procedures, treatments, medications and allow for questions  Note: Plan of Care discussed, all questions answered. Pt effectively communicates needs to staff.       Problem: Safety  Goal: Will remain free from falls  Outcome: PROGRESSING AS EXPECTED

## 2021-01-28 NOTE — FACE TO FACE
Face to Face Note  -  Durable Medical Equipment    Alber Guerra M.D. - NPI: [id number]  I certify that this patient is under my care and that they have had a durable medical equipment(DME)face to face encounter by myself that meets the physician DME face-to-face encounter requirements with this patient on:    Date of encounter:   Patient:                    MRN:                       YOB: 2022  Donna Isaac  [id number]  1965     The encounter with the patient was in whole, or in part, for the following medical condition, which is the primary reason for durable medical equipment:  Other - RLE Ankle Fracture.     I certify that, based on my findings, the following durable medical equipment is medically necessary:  Wheel Chair.    HOME O2 Saturation Measurements:(Values must be present for Home Oxygen orders)         ,     ,         My Clinical findings support the need for the above equipment due to:  Abnormal Gait    Supporting Symptoms: Pt presents to PT after fall with RLE ankle fracture that is now s/p surgery. She is able to demonstrate bed mobility and sit<>stands with SBA. However she is currently unable to effectively transfer or initiate gait/sidestepping 2' to LLE ankle pain (appears likely sprain/bruised). She is well versed in maintaining NWB RLE as she has performed this from prior co-morbidities and actually has fair setup at home and appropriate equipment (including knee scooter) and assist. Anticipate once pt able to effectively demonstrate stand pivot transfer on LLE she will be capable of dc to home, though current pain at LLE ankle is limiting ability to perform without increased assist. See below for details/recs. Spoke with nursing re: dc concerns and need for appropriate pain management for dc planning with re: mobility.      Patient is 55 y.o. female presents to skilled OT services following GLF resulting in R bimalleolar ankle fx now s/p ORIF. Pt was able to  perform UB ADLs with supervision, LB ADLs min a, discussed at length modifications for LB dressing, wrapping of LLE for showers, clothing modifications and use of BSC for energy conservation and safety especially during night time, performed eob->chair t/f with min a, required increased assist to come upright from lower surface and cues to maintain TTWB, spouse present during session and reports feel comfortable physical assisting pt with transfer at current level. Pt will need w/c with removable armrests and elevating leg rests, aware how to obtain BSC in community. Pt reports spouse has assisted in past recovering from NWB RLE and use of scooter. Will follow while in house and will remain available to answer questions as needed.     ------------------------------------------------------------------------------------------------------------------    Face to Face Supporting Documentation - Home Health    The encounter with this patient was in whole or in part the primary reason for home health admission.    Date of encounter:   Patient:                    MRN:                       YOB: 2022  Donna Isaac  [id number]  1965     Home health to see patient for:  Skilled Nursing care for assessment, interventions & education, Physical Therapy evaluation and treatment and Occupational therapy evaluation and treatment    Skilled need for:  Surgical Aftercare Grade 2 open right bimalleolar ankle fracture.  S/P ORIF w/ irrigation and debridement.      Skilled nursing interventions to include:  Recommend Physical Therapy 3 times per week until therapy goals are met for the following treatments:  Community Re-integration, Equipment, Gait Training, Manual Therapy, Neuro Re-Education / Balance, Self Care/Home Evaluation, Stair Training, Therapeutic Activities and Therapeutic Exercises     DC Equipment Recommendations: Unable to determine at this time, Front-Wheel Walker  Discharge  Recommendations: Recommend home health for continued physical therapy services(anticipated once pain managed to allow transfers)     Recommend Occupational Therapy 3 times per week until therapy goals are met for the following treatments:  Adaptive Equipment, Self Care/Activities of Daily Living, Therapeutic Activities and Therapeutic Exercises.     DC Equipment Recommendations: Other (Comments)(w/c with elevating leg rests and removable arms, drop arm BS)  Discharge Recommendations: Anticipate that the patient will have no further occupational therapy needs after discharge from the hospital(Likely will need OP therapy once restrictions are lifted)         Homebound evidenced status by:  Need the aid of supportive devices such as crutches, canes, wheelchairs or walkers. Leaving home must require a considerable and taxing effort. There must exist a normal inability to leave the home.    Community Physician to provide follow up care: Madisyn Mccullough M.D.     Optional Interventions    Wound information & treatment:    Home Infusion Therapy orders:    Line/Drain/Airway:    I certify the face to face encounter for this home care referral meets the CMS requirements and the encounter/clinical assessment with the patient was, in whole, or in part, for the medical condition(s) listed above, which is the primary reason for home health care. Based on my clinical findings: the service(s) are medically necessary, support the need for home health care, and the homebound criteria are met.  I certify that this patient has had a face to face encounter by myself.  Alber Guerra M.D. - NPI: [id number]    *Debility, frailty and advanced age in the absence of an acute deterioration or exacerbation of a condition do not qualify a patient for home health.

## 2021-01-28 NOTE — THERAPY
Occupational Therapy   Initial Evaluation     Patient Name: Donna Isaac  Age:  55 y.o., Sex:  female  Medical Record #: [id number]  Today's Date: 1/27/2021     Precautions  Precautions: Fall Risk, Toe Touch Weight Bearing Right Lower Extremity  Comments: TTWB x6 weeks    Assessment  Patient is 55 y.o. female presents to skilled OT services following GLF resulting in R bimalleolar ankle fx now s/p ORIF. Pt was able to perform UB ADLs with supervision, LB ADLs min a, discussed at length modifications for LB dressing, wrapping of LLE for showers, clothing modifications and use of BSC for energy conservation and safety especially during night time, performed eob->chair t/f with min a, required increased assist to come upright from lower surface and cues to maintain TTWB, spouse present during session and reports feel comfortable physical assisting pt with transfer at current level. Pt will need w/c with removable armrests and elevating leg rests, aware how to obtain BSC in community. Pt reports spouse has assisted in past recovering from NWB RLE and use of scooter. Will follow while in house and will remain available to answer questions as needed.       Plan    Recommend Occupational Therapy 3 times per week until therapy goals are met for the following treatments:  Adaptive Equipment, Self Care/Activities of Daily Living, Therapeutic Activities and Therapeutic Exercises.    DC Equipment Recommendations: Other (Comments)(w/c with elevating leg rests and removable arms, drop arm BS)  Discharge Recommendations: Anticipate that the patient will have no further occupational therapy needs after discharge from the hospital(Likely will need OP therapy once restrictions are lifted)        Objective       01/27/21 1642   Prior Living Situation   Prior Services None   Housing / Facility 1 Story House   Steps Into Home 0   Steps In Home 0   Bathroom Set up Walk In Shower;Grab Bars;Shower Chair  (w/c accessible shower)    Equipment Owned Tub / Shower Seat;Grab Bar(s) In Tub / Shower;Grab Bar(s) By Toilet;Other (Comments)  (adustable recliner and knee scooter)   Lives with - Patient's Self Care Capacity Spouse   Comments I with ADLs/IADLs baseline. Spouse able to assist as needed, present during session and reports no concerns with assiting pt with t/f's as needed   Prior Level of ADL Function   Self Feeding Independent   Grooming / Hygiene Independent   Bathing Independent   Dressing Independent   Toileting Independent   Prior Level of IADL Function   Medication Management Independent   Laundry Independent   Kitchen Mobility Independent   Finances Independent   Home Management Independent   Shopping Independent   Prior Level Of Mobility Independent Without Device in Community   Driving / Transportation Driving Independent   Balance Assessment   Sitting Balance (Static) Good   Sitting Balance (Dynamic) Good   Standing Balance (Static) Fair -   Weight Shift Sitting Good   Comments low squat pivot t/f only, able to maintain TTWB    Bed Mobility    Supine to Sit Supervised   Sit to Supine Supervised   Scooting Supervised   Rolling   (sit pivot)   Comments raised hob, has adjustable recliner at home which pt is planning to sleep in   ADL Assessment   Eating Independent   Grooming Supervision;Seated   Bathing   (discussed home s/u, AE, &wrapping of LLE)   Upper Body Dressing Modified Independent   Lower Body Dressing Minimal Assist   Toileting   (declined need to use BR)   Comments Discussed use of BSC for nightime and as needed during daytime 2/2 fatigue and pain, aware how to obtain it in the community. Will have assist from spouse as needed   Functional Mobility   Sit to Stand Supervised  (cga/sba for low squat pivot)   Bed, Chair, Wheelchair Transfer Minimal Assist   Toilet Transfers Refused  (declined need to use BR)   Transfer Method Squat Pivot  (low squat pivot)   Mobility bed mobility, eob<>chair    Comments w/min a for cues and  techniques, w/ 3rd transfer and low surface increased assist required. Spouse present and reports he can provide assist with t/f as much needed   Short Term Goals   Short Term Goal # 1 Pt will perform BSC transfer with supervision   Short Term Goal # 2 Pt will perform toileting task with supervision   Anticipated Discharge Equipment and Recommendations   DC Equipment Recommendations Other (Comments)  (w/c with elevating leg rests and removable arms, drop arm BS)

## 2021-01-28 NOTE — CARE PLAN
Problem: Pain Management  Goal: Pain level will decrease to patient's comfort goal  Outcome: PROGRESSING AS EXPECTED   Pt pain level controlled with PRN pain medication at this time.   Problem: Communication  Goal: The ability to communicate needs accurately and effectively will improve  Outcome: PROGRESSING AS EXPECTED   Pt able to use call bell to express needs. All needs addressed at this time. Will continue to monitor during Q2 rounding and PRN.

## 2021-01-28 NOTE — PROGRESS NOTES
"Patient seen and examined  Just finished with pt, wants to go home    BP (!) 174/114   Pulse 91   Temp 36.8 °C (98.3 °F) (Temporal)   Resp 16   Ht 1.626 m (5' 4\")   Wt 95.3 kg (210 lb)   SpO2 96%     Recent Labs     01/26/21  1706 01/27/21  0330 01/28/21  0211   WBC 6.8 8.0 8.4   RBC 3.95* 4.24 3.80*   HEMOGLOBIN 12.2 13.2 11.9*   HEMATOCRIT 38.7 41.4 37.6   MCV 98.0* 97.6 98.9*   MCH 30.9 31.1 31.3   MCHC 31.5* 31.9* 31.6*   RDW 58.3* 58.8* 59.7*   PLATELETCT 262 299 238   MPV 10.2 10.0 10.2       No acute distress  Dressing clean dry and intact  Neurovascularly intact    POD# 1 ORIF ankle    Plan:  DVT Prophylaxis- TEDS/SCDs,  ASA BID   Weight Bearing Status- TTWB  PT/OT  Antibiotics: complete  Case Coordination          "

## 2021-01-28 NOTE — DISCHARGE PLANNING
Anticipated Discharge Disposition: Home with HH and w/c    Action: Spoke to pt at bedside, pt gave verbal choice for both HH and DME. Choice forms faxed to [facility].    Barriers to Discharge: Ortho and medical clearance, HH acceptance, W/C delivery    Plan: f/u with medical team, pt, CCA

## 2021-01-28 NOTE — THERAPY
Physical Therapy   Daily Treatment     Patient Name: Donna Isaac  Age:  55 y.o., Sex:  female  Medical Record #: [id number]  Today's Date: 1/28/2021     Precautions: Fall Risk, Toe Touch Weight Bearing Right Lower Extremity    Assessment    Pt seen for PT treatment session, pt reports she has been working on gentle ROM and stretching exercises with L ankle but noticed she might have overdone it yesterday with increased pain overnight. Educated on set up of WC to ease pivot transfers to reduce strain on L ankle while maintaining TTWB R LE. Pt performed stand pivot EOB to chair with SPV. Will have assist from Spouse upon DC home as he plans to take some time off work until pt is more functional. Pt reports their home is handicap accessible, and she plans to use her knee scooter when she is more comfortable WBing through L foot/ankle. Until that time, WC mobility is best option to maintain post operative WBing restrictions and allow pt to be more functionally independent in home. Pt is capable of DC home at  level from PT standpoint, PT will continue to follow while in house.     Plan    Continue current treatment plan.    DC Equipment Recommendations: Wheelchair  Discharge Recommendations: Recommend home health for continued physical therapy services       01/28/21 1303   Precautions   Precautions Fall Risk;Toe Touch Weight Bearing Right Lower Extremity   Comments TTWB x6 weeks, splinted   Vitals   O2 Delivery Device None - Room Air   Pain 0 - 10 Group   Therapist Pain Assessment During Activity;Nurse Notified  (minimal pain reported)   Cognition    Cognition / Consciousness WDL   Level of Consciousness Alert   Comments pleasant and receptive. aware of restrictions from prior surgeries. Plans to use knee scooter when L ankle is healed   Balance   Sitting Balance (Static) Good   Sitting Balance (Dynamic) Good   Standing Balance (Static) Fair   Standing Balance (Dynamic) Fair   Weight Shift Sitting Good    Weight Shift Standing Fair   Skilled Intervention Compensatory Strategies;Verbal Cuing   Comments for pivot transfer milagro   Gait Analysis   Gait Level Of Assist Unable to Participate   Weight Bearing Status TTWB R LE, L ankle sprain   Bed Mobility    Supine to Sit Supervised  (HOB fully elevated)   Scooting Supervised   Skilled Intervention Verbal Cuing   Functional Mobility   Sit to Stand Supervised   Bed, Chair, Wheelchair Transfer Supervised   Transfer Method Stand Pivot   Skilled Intervention Verbal Cuing   Comments educated on set up of WC to ease transfers between various surfaces   Patient / Family Goals    Patient / Family Goal #1 to return home   Goal #1 Outcome Goal not met   Short Term Goals    Short Term Goal # 1 Pt will be able to perform sit<>stand/transfers with FWW with Spv in 6tx to promote fnx progression to I    Goal Outcome # 1 Goal met   Short Term Goal # 2 Pt will be able to ambulate 15ft with FWW with SPv in 6tx to promote increased OOB activiyt    Goal Outcome # 2 Goal not met   Anticipated Discharge Equipment and Recommendations   DC Equipment Recommendations Wheelchair   Discharge Recommendations Recommend home health for continued physical therapy services

## 2021-01-28 NOTE — DISCHARGE PLANNING
Received Choice form at 1004  Agency/Facility Name: [facility]  Referral sent per Choice form @ 1752

## 2021-01-29 NOTE — PROGRESS NOTES
Pt discharged home via personal wheelchair with . IV d/c'd. All personal belongings sent with pt including phone. Discharge summary reviewed with pt and all questions answered. Pt agreeable to D/C plan.     [facility] delivered pt's wheelchair prior to DC. Home health arranged.

## 2021-02-02 ENCOUNTER — HOSPITAL ENCOUNTER (OUTPATIENT)
Dept: LAB | Facility: MEDICAL CENTER | Age: 56
End: 2021-02-02
Attending: INTERNAL MEDICINE
Payer: MEDICARE

## 2021-02-02 PROCEDURE — 83520 IMMUNOASSAY QUANT NOS NONAB: CPT

## 2021-02-02 PROCEDURE — 82340 ASSAY OF CALCIUM IN URINE: CPT

## 2021-02-02 PROCEDURE — 84100 ASSAY OF PHOSPHORUS: CPT

## 2021-02-02 PROCEDURE — 84105 ASSAY OF URINE PHOSPHORUS: CPT

## 2021-02-02 PROCEDURE — 36415 COLL VENOUS BLD VENIPUNCTURE: CPT

## 2021-02-02 PROCEDURE — 80053 COMPREHEN METABOLIC PANEL: CPT

## 2021-02-02 PROCEDURE — 82570 ASSAY OF URINE CREATININE: CPT

## 2021-02-03 LAB
ALBUMIN SERPL BCP-MCNC: 4.1 G/DL (ref 3.2–4.9)
ALBUMIN/GLOB SERPL: 1.5 G/DL
ALP SERPL-CCNC: 98 U/L (ref 30–99)
ALT SERPL-CCNC: 26 U/L (ref 2–50)
ANION GAP SERPL CALC-SCNC: 14 MMOL/L (ref 7–16)
AST SERPL-CCNC: 24 U/L (ref 12–45)
BILIRUB SERPL-MCNC: 0.3 MG/DL (ref 0.1–1.5)
BUN SERPL-MCNC: 16 MG/DL (ref 8–22)
CALCIUM SERPL-MCNC: 9.3 MG/DL (ref 8.5–10.5)
CHLORIDE SERPL-SCNC: 99 MMOL/L (ref 96–112)
CO2 SERPL-SCNC: 24 MMOL/L (ref 20–33)
CREAT SERPL-MCNC: 1.02 MG/DL (ref 0.5–1.4)
CREAT UR-MCNC: 17.5 MG/DL
GLOBULIN SER CALC-MCNC: 2.8 G/DL (ref 1.9–3.5)
GLUCOSE SERPL-MCNC: 99 MG/DL (ref 65–99)
PHOSPHATE SERPL-MCNC: 3.1 MG/DL (ref 2.5–4.5)
POTASSIUM SERPL-SCNC: 4.1 MMOL/L (ref 3.6–5.5)
PROT SERPL-MCNC: 6.9 G/DL (ref 6–8.2)
SODIUM SERPL-SCNC: 137 MMOL/L (ref 135–145)

## 2021-02-04 LAB
CALCIUM 24H UR-MCNC: 6.1 MG/DL
CALCIUM 24H UR-MRATE: ABNORMAL MG/D
CALCIUM/CREAT 24H UR: 381 MG/G (ref 20–300)
COLLECT DURATION TIME SPEC: ABNORMAL HRS
COLLECT DURATION TIME SPEC: NORMAL HRS
CREAT 24H UR-MCNC: 16 MG/DL
CREAT 24H UR-MRATE: ABNORMAL MG/D (ref 500–1400)
PHOSPHATE 24H UR-MCNC: 10 MG/DL
PHOSPHATE 24H UR-MRATE: NORMAL MG/D (ref 400–1300)
PHOSPHATE/CREAT 24H UR: 625 MG/G
SPECIMEN VOL ?TM UR: ABNORMAL ML
TOTAL VOLUME 1105: NORMAL ML

## 2021-02-05 LAB — TRYPTASE SERPL-MCNC: 3.7 UG/L

## 2021-02-22 ENCOUNTER — HOSPITAL ENCOUNTER (OUTPATIENT)
Facility: MEDICAL CENTER | Age: 56
End: 2021-02-22
Attending: NURSE PRACTITIONER
Payer: MEDICARE

## 2021-02-22 ENCOUNTER — HOSPITAL ENCOUNTER (OUTPATIENT)
Dept: LAB | Facility: MEDICAL CENTER | Age: 56
End: 2021-02-22
Attending: INTERNAL MEDICINE
Payer: MEDICARE

## 2021-02-22 ENCOUNTER — OFFICE VISIT (OUTPATIENT)
Dept: URGENT CARE | Facility: CLINIC | Age: 56
End: 2021-02-22
Payer: MEDICARE

## 2021-02-22 VITALS
BODY MASS INDEX: 37.56 KG/M2 | SYSTOLIC BLOOD PRESSURE: 140 MMHG | TEMPERATURE: 97.8 F | WEIGHT: 220 LBS | OXYGEN SATURATION: 97 % | DIASTOLIC BLOOD PRESSURE: 90 MMHG | HEART RATE: 87 BPM | HEIGHT: 64 IN | RESPIRATION RATE: 18 BRPM

## 2021-02-22 DIAGNOSIS — Z00.00 HEALTH CARE MAINTENANCE: ICD-10-CM

## 2021-02-22 DIAGNOSIS — N94.9 VAGINAL DISCOMFORT: ICD-10-CM

## 2021-02-22 DIAGNOSIS — R82.90 ABNORMAL URINE ODOR: ICD-10-CM

## 2021-02-22 DIAGNOSIS — A49.02 MRSA INFECTION: ICD-10-CM

## 2021-02-22 DIAGNOSIS — S31.109A OPEN WOUND OF ABDOMINAL WALL, INITIAL ENCOUNTER: ICD-10-CM

## 2021-02-22 DIAGNOSIS — N89.8 VAGINAL DISCHARGE: ICD-10-CM

## 2021-02-22 DIAGNOSIS — R30.0 DYSURIA: ICD-10-CM

## 2021-02-22 LAB
ALBUMIN SERPL BCP-MCNC: 3.6 G/DL (ref 3.2–4.9)
ALBUMIN SERPL BCP-MCNC: 3.8 G/DL (ref 3.2–4.9)
ALBUMIN/GLOB SERPL: 1.3 G/DL
ALBUMIN/GLOB SERPL: 1.4 G/DL
ALP SERPL-CCNC: 149 U/L (ref 30–99)
ALP SERPL-CCNC: 151 U/L (ref 30–99)
ALT SERPL-CCNC: 27 U/L (ref 2–50)
ALT SERPL-CCNC: 27 U/L (ref 2–50)
ANION GAP SERPL CALC-SCNC: 10 MMOL/L (ref 7–16)
ANION GAP SERPL CALC-SCNC: 8 MMOL/L (ref 7–16)
APPEARANCE UR: NORMAL
AST SERPL-CCNC: 21 U/L (ref 12–45)
AST SERPL-CCNC: 21 U/L (ref 12–45)
BASOPHILS # BLD AUTO: 0.3 % (ref 0–1.8)
BASOPHILS # BLD: 0.03 K/UL (ref 0–0.12)
BILIRUB SERPL-MCNC: 0.3 MG/DL (ref 0.1–1.5)
BILIRUB SERPL-MCNC: 0.3 MG/DL (ref 0.1–1.5)
BILIRUB UR STRIP-MCNC: NEGATIVE MG/DL
BUN SERPL-MCNC: 17 MG/DL (ref 8–22)
BUN SERPL-MCNC: 20 MG/DL (ref 8–22)
CALCIUM SERPL-MCNC: 9.4 MG/DL (ref 8.5–10.5)
CALCIUM SERPL-MCNC: 9.4 MG/DL (ref 8.5–10.5)
CANDIDA DNA VAG QL PROBE+SIG AMP: POSITIVE
CHLORIDE SERPL-SCNC: 101 MMOL/L (ref 96–112)
CHLORIDE SERPL-SCNC: 103 MMOL/L (ref 96–112)
CO2 SERPL-SCNC: 23 MMOL/L (ref 20–33)
CO2 SERPL-SCNC: 25 MMOL/L (ref 20–33)
COLOR UR AUTO: YELLOW
CREAT SERPL-MCNC: 0.95 MG/DL (ref 0.5–1.4)
CREAT SERPL-MCNC: 0.96 MG/DL (ref 0.5–1.4)
CREAT UR-MCNC: 94.96 MG/DL
CRP SERPL HS-MCNC: 0.59 MG/DL (ref 0–0.75)
EOSINOPHIL # BLD AUTO: 0.14 K/UL (ref 0–0.51)
EOSINOPHIL NFR BLD: 1.6 % (ref 0–6.9)
ERYTHROCYTE [DISTWIDTH] IN BLOOD BY AUTOMATED COUNT: 57.7 FL (ref 35.9–50)
ERYTHROCYTE [SEDIMENTATION RATE] IN BLOOD BY WESTERGREN METHOD: 31 MM/HOUR (ref 0–30)
G VAGINALIS DNA VAG QL PROBE+SIG AMP: NEGATIVE
GLOBULIN SER CALC-MCNC: 2.8 G/DL (ref 1.9–3.5)
GLOBULIN SER CALC-MCNC: 2.8 G/DL (ref 1.9–3.5)
GLUCOSE SERPL-MCNC: 89 MG/DL (ref 65–99)
GLUCOSE SERPL-MCNC: 92 MG/DL (ref 65–99)
GLUCOSE UR STRIP.AUTO-MCNC: 100 MG/DL
HCT VFR BLD AUTO: 42.6 % (ref 37–47)
HGB BLD-MCNC: 13.4 G/DL (ref 12–16)
IMM GRANULOCYTES # BLD AUTO: 0.09 K/UL (ref 0–0.11)
IMM GRANULOCYTES NFR BLD AUTO: 1 % (ref 0–0.9)
KETONES UR STRIP.AUTO-MCNC: NEGATIVE MG/DL
LEUKOCYTE ESTERASE UR QL STRIP.AUTO: NORMAL
LYMPHOCYTES # BLD AUTO: 2.02 K/UL (ref 1–4.8)
LYMPHOCYTES NFR BLD: 23.4 % (ref 22–41)
MCH RBC QN AUTO: 30.9 PG (ref 27–33)
MCHC RBC AUTO-ENTMCNC: 31.5 G/DL (ref 33.6–35)
MCV RBC AUTO: 98.2 FL (ref 81.4–97.8)
MONOCYTES # BLD AUTO: 0.83 K/UL (ref 0–0.85)
MONOCYTES NFR BLD AUTO: 9.6 % (ref 0–13.4)
NEUTROPHILS # BLD AUTO: 5.51 K/UL (ref 2–7.15)
NEUTROPHILS NFR BLD: 64.1 % (ref 44–72)
NITRITE UR QL STRIP.AUTO: POSITIVE
NRBC # BLD AUTO: 0 K/UL
NRBC BLD-RTO: 0 /100 WBC
PH UR STRIP.AUTO: 5 [PH] (ref 5–8)
PHOSPHATE SERPL-MCNC: 3.5 MG/DL (ref 2.5–4.5)
PLATELET # BLD AUTO: 242 K/UL (ref 164–446)
PMV BLD AUTO: 10.4 FL (ref 9–12.9)
POTASSIUM SERPL-SCNC: 4.2 MMOL/L (ref 3.6–5.5)
POTASSIUM SERPL-SCNC: 4.5 MMOL/L (ref 3.6–5.5)
POTASSIUM UR-SCNC: 43 MMOL/L
PROT SERPL-MCNC: 6.4 G/DL (ref 6–8.2)
PROT SERPL-MCNC: 6.6 G/DL (ref 6–8.2)
PROT UR QL STRIP: NEGATIVE MG/DL
PTH-INTACT SERPL-MCNC: 43.2 PG/ML (ref 14–72)
RBC # BLD AUTO: 4.34 M/UL (ref 4.2–5.4)
RBC UR QL AUTO: NEGATIVE
SODIUM SERPL-SCNC: 132 MMOL/L (ref 135–145)
SODIUM SERPL-SCNC: 138 MMOL/L (ref 135–145)
SODIUM UR-SCNC: 127 MMOL/L
SP GR UR STRIP.AUTO: 1.02
T VAGINALIS DNA VAG QL PROBE+SIG AMP: NEGATIVE
UROBILINOGEN UR STRIP-MCNC: 0.2 MG/DL
WBC # BLD AUTO: 8.6 K/UL (ref 4.8–10.8)

## 2021-02-22 PROCEDURE — 83520 IMMUNOASSAY QUANT NOS NONAB: CPT

## 2021-02-22 PROCEDURE — 84300 ASSAY OF URINE SODIUM: CPT

## 2021-02-22 PROCEDURE — 85025 COMPLETE CBC W/AUTO DIFF WBC: CPT

## 2021-02-22 PROCEDURE — 85652 RBC SED RATE AUTOMATED: CPT

## 2021-02-22 PROCEDURE — 86140 C-REACTIVE PROTEIN: CPT

## 2021-02-22 PROCEDURE — 87660 TRICHOMONAS VAGIN DIR PROBE: CPT

## 2021-02-22 PROCEDURE — 83970 ASSAY OF PARATHORMONE: CPT

## 2021-02-22 PROCEDURE — 84105 ASSAY OF URINE PHOSPHORUS: CPT

## 2021-02-22 PROCEDURE — 84133 ASSAY OF URINE POTASSIUM: CPT

## 2021-02-22 PROCEDURE — 84100 ASSAY OF PHOSPHORUS: CPT

## 2021-02-22 PROCEDURE — 80053 COMPREHEN METABOLIC PANEL: CPT

## 2021-02-22 PROCEDURE — 87205 SMEAR GRAM STAIN: CPT

## 2021-02-22 PROCEDURE — 87186 SC STD MICRODIL/AGAR DIL: CPT

## 2021-02-22 PROCEDURE — 87077 CULTURE AEROBIC IDENTIFY: CPT

## 2021-02-22 PROCEDURE — 80053 COMPREHEN METABOLIC PANEL: CPT | Mod: 91

## 2021-02-22 PROCEDURE — 87086 URINE CULTURE/COLONY COUNT: CPT

## 2021-02-22 PROCEDURE — 87070 CULTURE OTHR SPECIMN AEROBIC: CPT | Mod: XU

## 2021-02-22 PROCEDURE — 82163 ASSAY OF ANGIOTENSIN II: CPT

## 2021-02-22 PROCEDURE — 84244 ASSAY OF RENIN: CPT

## 2021-02-22 PROCEDURE — 82340 ASSAY OF CALCIUM IN URINE: CPT

## 2021-02-22 PROCEDURE — 84305 ASSAY OF SOMATOMEDIN: CPT

## 2021-02-22 PROCEDURE — 82570 ASSAY OF URINE CREATININE: CPT

## 2021-02-22 PROCEDURE — 81002 URINALYSIS NONAUTO W/O SCOPE: CPT | Performed by: NURSE PRACTITIONER

## 2021-02-22 PROCEDURE — 99215 OFFICE O/P EST HI 40 MIN: CPT | Performed by: NURSE PRACTITIONER

## 2021-02-22 PROCEDURE — 87510 GARDNER VAG DNA DIR PROBE: CPT

## 2021-02-22 PROCEDURE — 87480 CANDIDA DNA DIR PROBE: CPT

## 2021-02-22 PROCEDURE — 36415 COLL VENOUS BLD VENIPUNCTURE: CPT

## 2021-02-22 ASSESSMENT — ENCOUNTER SYMPTOMS
CHILLS: 0
FEVER: 0
ABDOMINAL PAIN: 0
FLANK PAIN: 0
NAUSEA: 0
FATIGUE: 0
VOMITING: 0
MYALGIAS: 0

## 2021-02-22 ASSESSMENT — FIBROSIS 4 INDEX: FIB4 SCORE: 1.11
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
## 2021-02-23 ENCOUNTER — PATIENT MESSAGE (OUTPATIENT)
Dept: URGENT CARE | Facility: CLINIC | Age: 56
End: 2021-02-23

## 2021-02-23 DIAGNOSIS — B37.31 VAGINAL CANDIDIASIS: ICD-10-CM

## 2021-02-23 LAB
GRAM STN SPEC: NORMAL
SIGNIFICANT IND 70042: NORMAL
SITE SITE: NORMAL
SOURCE SOURCE: NORMAL

## 2021-02-24 ENCOUNTER — TELEPHONE (OUTPATIENT)
Dept: URGENT CARE | Facility: CLINIC | Age: 56
End: 2021-02-24

## 2021-02-24 LAB
BACTERIA UR CULT: ABNORMAL
BACTERIA UR CULT: ABNORMAL
BACTERIA WND AEROBE CULT: ABNORMAL
BACTERIA WND AEROBE CULT: ABNORMAL
CALCIUM 24H UR-MCNC: 3.4 MG/DL
CALCIUM 24H UR-MRATE: NORMAL MG/D
CALCIUM/CREAT 24H UR: 35 MG/G (ref 20–300)
COLLECT DURATION TIME SPEC: NORMAL HRS
COLLECT DURATION TIME SPEC: NORMAL HRS
CREAT 24H UR-MCNC: 97 MG/DL
CREAT 24H UR-MRATE: NORMAL MG/D (ref 500–1400)
GRAM STN SPEC: ABNORMAL
IGF-I SERPL-MCNC: 148 NG/ML (ref 48–235)
IGF-I Z-SCORE SERPL: 0.6
PHOSPHATE 24H UR-MCNC: 87 MG/DL
PHOSPHATE 24H UR-MRATE: NORMAL MG/D (ref 400–1300)
PHOSPHATE/CREAT 24H UR: 897 MG/G
SIGNIFICANT IND 70042: ABNORMAL
SIGNIFICANT IND 70042: ABNORMAL
SITE SITE: ABNORMAL
SITE SITE: ABNORMAL
SOURCE SOURCE: ABNORMAL
SOURCE SOURCE: ABNORMAL
SPECIMEN VOL ?TM UR: NORMAL ML
TOTAL VOLUME 1105: NORMAL ML

## 2021-02-24 RX ORDER — FLUCONAZOLE 150 MG/1
150 TABLET ORAL DAILY
Refills: 0 | Status: CANCELLED | OUTPATIENT
Start: 2021-02-24

## 2021-02-24 RX ORDER — FLUCONAZOLE 150 MG/1
TABLET ORAL
Qty: 2 TABLET | Refills: 0 | Status: SHIPPED | OUTPATIENT
Start: 2021-02-24 | End: 2021-03-16

## 2021-02-25 LAB
RENIN PLAS-CCNC: 11.3 NG/ML/HR
TRYPTASE SERPL-MCNC: 2.9 UG/L

## 2021-03-01 ENCOUNTER — HOSPITAL ENCOUNTER (OUTPATIENT)
Facility: MEDICAL CENTER | Age: 56
End: 2021-03-01
Attending: INTERNAL MEDICINE
Payer: MEDICARE

## 2021-03-01 ENCOUNTER — HOSPITAL ENCOUNTER (OUTPATIENT)
Dept: RADIOLOGY | Facility: MEDICAL CENTER | Age: 56
End: 2021-03-01
Attending: INTERNAL MEDICINE
Payer: MEDICARE

## 2021-03-01 DIAGNOSIS — Z79.2 ENCOUNTER FOR LONG-TERM (CURRENT) USE OF ANTIBIOTICS: ICD-10-CM

## 2021-03-01 DIAGNOSIS — J32.4 CHRONIC PANSINUSITIS: ICD-10-CM

## 2021-03-01 LAB
ALBUMIN SERPL BCP-MCNC: 3.5 G/DL (ref 3.2–4.9)
ALBUMIN/GLOB SERPL: 1.5 G/DL
ALP SERPL-CCNC: 116 U/L (ref 30–99)
ALT SERPL-CCNC: 22 U/L (ref 2–50)
ANION GAP SERPL CALC-SCNC: 16 MMOL/L (ref 7–16)
AST SERPL-CCNC: 24 U/L (ref 12–45)
BASOPHILS # BLD AUTO: 0.2 % (ref 0–1.8)
BASOPHILS # BLD: 0.01 K/UL (ref 0–0.12)
BILIRUB SERPL-MCNC: 0.2 MG/DL (ref 0.1–1.5)
BUN SERPL-MCNC: 14 MG/DL (ref 8–22)
CALCIUM SERPL-MCNC: 9.4 MG/DL (ref 8.5–10.5)
CHLORIDE SERPL-SCNC: 101 MMOL/L (ref 96–112)
CK SERPL-CCNC: 63 U/L (ref 0–154)
CO2 SERPL-SCNC: 18 MMOL/L (ref 20–33)
CREAT SERPL-MCNC: 1.01 MG/DL (ref 0.5–1.4)
CRP SERPL HS-MCNC: 0.33 MG/DL (ref 0–0.75)
EOSINOPHIL # BLD AUTO: 0.05 K/UL (ref 0–0.51)
EOSINOPHIL NFR BLD: 0.8 % (ref 0–6.9)
ERYTHROCYTE [DISTWIDTH] IN BLOOD BY AUTOMATED COUNT: 59.1 FL (ref 35.9–50)
ERYTHROCYTE [SEDIMENTATION RATE] IN BLOOD BY WESTERGREN METHOD: 15 MM/HOUR (ref 0–30)
GLOBULIN SER CALC-MCNC: 2.4 G/DL (ref 1.9–3.5)
GLUCOSE SERPL-MCNC: 189 MG/DL (ref 65–99)
HCT VFR BLD AUTO: 41.6 % (ref 37–47)
HGB BLD-MCNC: 12.7 G/DL (ref 12–16)
IMM GRANULOCYTES # BLD AUTO: 0.03 K/UL (ref 0–0.11)
IMM GRANULOCYTES NFR BLD AUTO: 0.5 % (ref 0–0.9)
LYMPHOCYTES # BLD AUTO: 2.06 K/UL (ref 1–4.8)
LYMPHOCYTES NFR BLD: 32.9 % (ref 22–41)
MCH RBC QN AUTO: 30.2 PG (ref 27–33)
MCHC RBC AUTO-ENTMCNC: 30.5 G/DL (ref 33.6–35)
MCV RBC AUTO: 99 FL (ref 81.4–97.8)
MONOCYTES # BLD AUTO: 0.59 K/UL (ref 0–0.85)
MONOCYTES NFR BLD AUTO: 9.4 % (ref 0–13.4)
NEUTROPHILS # BLD AUTO: 3.53 K/UL (ref 2–7.15)
NEUTROPHILS NFR BLD: 56.2 % (ref 44–72)
NRBC # BLD AUTO: 0 K/UL
NRBC BLD-RTO: 0 /100 WBC
PLATELET # BLD AUTO: 279 K/UL (ref 164–446)
PMV BLD AUTO: 10.3 FL (ref 9–12.9)
POTASSIUM SERPL-SCNC: 4.5 MMOL/L (ref 3.6–5.5)
PROT SERPL-MCNC: 5.9 G/DL (ref 6–8.2)
RBC # BLD AUTO: 4.2 M/UL (ref 4.2–5.4)
SODIUM SERPL-SCNC: 135 MMOL/L (ref 135–145)
WBC # BLD AUTO: 6.3 K/UL (ref 4.8–10.8)

## 2021-03-01 PROCEDURE — 82550 ASSAY OF CK (CPK): CPT

## 2021-03-01 PROCEDURE — 80053 COMPREHEN METABOLIC PANEL: CPT

## 2021-03-01 PROCEDURE — 85652 RBC SED RATE AUTOMATED: CPT

## 2021-03-01 PROCEDURE — 85025 COMPLETE CBC W/AUTO DIFF WBC: CPT

## 2021-03-01 PROCEDURE — 86140 C-REACTIVE PROTEIN: CPT

## 2021-03-01 PROCEDURE — 36573 INSJ PICC RS&I 5 YR+: CPT

## 2021-03-01 NOTE — PROGRESS NOTES
Vascular Access Team     Date of Insertion: 3/1/2021  Arm Circumference: 39.5  Internal length: 43  External Length: 3  Vein Occupancy %: 32   Reason for PICC: IV ABX  Labs: WBC 8.6, , BUN 17, Cr 0.95, GFR >60     Consents confirmed, vessel patency confirmed with ultrasound. Risks and benefits of procedure explained to patient and education regarding central line associated bloodstream infections provided. Questions answered.      PICC placed in RUE per licensed provider order with ultrasound guidance.  4 Fr, 1 lumen PICC placed in BRACHIAL vein after 2 attempt(s). 4 mL of 1% lidocaine injected intradermally, 21 gauge microintroducer needle and modified Seldinger technique used. 43 cm catheter inserted with good blood return. Secured at 3 cm marker. Each lumen flushed without resistance with 10 mL 0.9% normal saline. PICC line secured with Biopatch and Tegaderm.     PICC tip placement location is confirmed by nurse to be in the Superior Vena Cava (SVC) utilizing 3CG technology. PICC line is appropriate for use at this time. Patient tolerated procedure well, without complications.  Patient condition relayed to unit RN or ordering physician via this post procedure note in the EMR.      Ultrasound images uploaded to PACS and viewable in the EMR - no  Ultrasound imaged printed and placed in paper chart - yes     BARD Power PICC ref # [id number], Lot # [id number], Expiration Date 2021-07-31    FIRST ATTEMPTED ACCESS IN R BASILIC. ABLE TO ACCESS VEIN WITH GOOD BLOOD RETURN. RESISTANCE FELT WHEN ADVANCING GUIDEWIRE AND UNABLE TO ADVANCE GUIDEWIRE PAST APPROXIMATELY 10CM. ACCESS SITE ADJUSTED TO R BRACHIAL WITH SUCCESS.

## 2021-03-06 LAB — ANGIOTENSIN II 5912: 46 NG/L

## 2021-03-15 DIAGNOSIS — Z23 NEED FOR VACCINATION: ICD-10-CM

## 2021-03-16 ENCOUNTER — PRE-ADMISSION TESTING (OUTPATIENT)
Dept: ADMISSIONS | Facility: MEDICAL CENTER | Age: 56
End: 2021-03-16
Attending: ORTHOPAEDIC SURGERY
Payer: MEDICARE

## 2021-03-16 ENCOUNTER — OFFICE VISIT (OUTPATIENT)
Dept: CARDIOLOGY | Facility: MEDICAL CENTER | Age: 56
End: 2021-03-16
Payer: MEDICARE

## 2021-03-16 VITALS
HEART RATE: 88 BPM | OXYGEN SATURATION: 100 % | DIASTOLIC BLOOD PRESSURE: 82 MMHG | SYSTOLIC BLOOD PRESSURE: 112 MMHG | HEIGHT: 64 IN | WEIGHT: 223 LBS | BODY MASS INDEX: 38.07 KG/M2

## 2021-03-16 DIAGNOSIS — R09.89 LABILE HYPERTENSION: ICD-10-CM

## 2021-03-16 DIAGNOSIS — I42.8 OTHER CARDIOMYOPATHY (HCC): ICD-10-CM

## 2021-03-16 DIAGNOSIS — Z01.812 PRE-OPERATIVE LABORATORY EXAMINATION: ICD-10-CM

## 2021-03-16 DIAGNOSIS — I71.21 ASCENDING AORTIC ANEURYSM (HCC): ICD-10-CM

## 2021-03-16 LAB
SARS-COV+SARS-COV-2 AG RESP QL IA.RAPID: NOTDETECTED
SPECIMEN SOURCE: NORMAL

## 2021-03-16 PROCEDURE — 99214 OFFICE O/P EST MOD 30 MIN: CPT | Performed by: INTERNAL MEDICINE

## 2021-03-16 PROCEDURE — 87426 SARSCOV CORONAVIRUS AG IA: CPT

## 2021-03-16 ASSESSMENT — ENCOUNTER SYMPTOMS
PALPITATIONS: 0
SHORTNESS OF BREATH: 0
DIZZINESS: 1
MYALGIAS: 1
WEIGHT LOSS: 0
BLOOD IN STOOL: 0

## 2021-03-16 ASSESSMENT — FIBROSIS 4 INDEX: FIB4 SCORE: 1.03
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
## 2021-03-17 ENCOUNTER — ANESTHESIA EVENT (OUTPATIENT)
Dept: SURGERY | Facility: MEDICAL CENTER | Age: 56
End: 2021-03-17
Payer: MEDICARE

## 2021-03-17 ENCOUNTER — APPOINTMENT (OUTPATIENT)
Dept: RADIOLOGY | Facility: MEDICAL CENTER | Age: 56
End: 2021-03-17
Attending: ORTHOPAEDIC SURGERY
Payer: MEDICARE

## 2021-03-17 ENCOUNTER — HOSPITAL ENCOUNTER (OUTPATIENT)
Facility: MEDICAL CENTER | Age: 56
End: 2021-03-17
Attending: ORTHOPAEDIC SURGERY | Admitting: ORTHOPAEDIC SURGERY
Payer: MEDICARE

## 2021-03-17 ENCOUNTER — ANESTHESIA (OUTPATIENT)
Dept: SURGERY | Facility: MEDICAL CENTER | Age: 56
End: 2021-03-17
Payer: MEDICARE

## 2021-03-17 VITALS
RESPIRATION RATE: 16 BRPM | TEMPERATURE: 97 F | OXYGEN SATURATION: 98 % | HEART RATE: 72 BPM | WEIGHT: 220.02 LBS | DIASTOLIC BLOOD PRESSURE: 100 MMHG | SYSTOLIC BLOOD PRESSURE: 174 MMHG | BODY MASS INDEX: 37.56 KG/M2 | HEIGHT: 64 IN

## 2021-03-17 PROCEDURE — 160046 HCHG PACU - 1ST 60 MINS PHASE II: Performed by: ORTHOPAEDIC SURGERY

## 2021-03-17 PROCEDURE — 160025 RECOVERY II MINUTES (STATS): Performed by: ORTHOPAEDIC SURGERY

## 2021-03-17 PROCEDURE — 700101 HCHG RX REV CODE 250: Performed by: ANESTHESIOLOGY

## 2021-03-17 PROCEDURE — 500881 HCHG PACK, EXTREMITY: Performed by: ORTHOPAEDIC SURGERY

## 2021-03-17 PROCEDURE — 501838 HCHG SUTURE GENERAL: Performed by: ORTHOPAEDIC SURGERY

## 2021-03-17 PROCEDURE — 160009 HCHG ANES TIME/MIN: Performed by: ORTHOPAEDIC SURGERY

## 2021-03-17 PROCEDURE — 160048 HCHG OR STATISTICAL LEVEL 1-5: Performed by: ORTHOPAEDIC SURGERY

## 2021-03-17 PROCEDURE — 700111 HCHG RX REV CODE 636 W/ 250 OVERRIDE (IP): Performed by: ANESTHESIOLOGY

## 2021-03-17 PROCEDURE — 160028 HCHG SURGERY MINUTES - 1ST 30 MINS LEVEL 3: Performed by: ORTHOPAEDIC SURGERY

## 2021-03-17 PROCEDURE — 160002 HCHG RECOVERY MINUTES (STAT): Performed by: ORTHOPAEDIC SURGERY

## 2021-03-17 PROCEDURE — 160035 HCHG PACU - 1ST 60 MINS PHASE I: Performed by: ORTHOPAEDIC SURGERY

## 2021-03-17 PROCEDURE — 700105 HCHG RX REV CODE 258: Performed by: ORTHOPAEDIC SURGERY

## 2021-03-17 RX ORDER — MIDAZOLAM HYDROCHLORIDE 1 MG/ML
1 INJECTION INTRAMUSCULAR; INTRAVENOUS
Status: DISCONTINUED | OUTPATIENT
Start: 2021-03-17 | End: 2021-03-17 | Stop reason: HOSPADM

## 2021-03-17 RX ORDER — PROPOFOL 10 MG/ML
INJECTION, EMULSION INTRAVENOUS PRN
Status: DISCONTINUED | OUTPATIENT
Start: 2021-03-17 | End: 2021-03-17 | Stop reason: SURG

## 2021-03-17 RX ORDER — SODIUM CHLORIDE, SODIUM GLUCONATE, SODIUM ACETATE, POTASSIUM CHLORIDE AND MAGNESIUM CHLORIDE 526; 502; 368; 37; 30 MG/100ML; MG/100ML; MG/100ML; MG/100ML; MG/100ML
500 INJECTION, SOLUTION INTRAVENOUS CONTINUOUS
Status: DISCONTINUED | OUTPATIENT
Start: 2021-03-17 | End: 2021-03-17 | Stop reason: HOSPADM

## 2021-03-17 RX ORDER — IPRATROPIUM BROMIDE AND ALBUTEROL SULFATE 2.5; .5 MG/3ML; MG/3ML
3 SOLUTION RESPIRATORY (INHALATION)
Status: DISCONTINUED | OUTPATIENT
Start: 2021-03-17 | End: 2021-03-17 | Stop reason: HOSPADM

## 2021-03-17 RX ORDER — MEPERIDINE HYDROCHLORIDE 25 MG/ML
12.5 INJECTION INTRAMUSCULAR; INTRAVENOUS; SUBCUTANEOUS
Status: DISCONTINUED | OUTPATIENT
Start: 2021-03-17 | End: 2021-03-17 | Stop reason: HOSPADM

## 2021-03-17 RX ORDER — ROCURONIUM BROMIDE 10 MG/ML
INJECTION, SOLUTION INTRAVENOUS PRN
Status: DISCONTINUED | OUTPATIENT
Start: 2021-03-17 | End: 2021-03-17 | Stop reason: SURG

## 2021-03-17 RX ORDER — LIDOCAINE HYDROCHLORIDE 20 MG/ML
INJECTION, SOLUTION EPIDURAL; INFILTRATION; INTRACAUDAL; PERINEURAL PRN
Status: DISCONTINUED | OUTPATIENT
Start: 2021-03-17 | End: 2021-03-17 | Stop reason: SURG

## 2021-03-17 RX ORDER — HALOPERIDOL 5 MG/ML
1 INJECTION INTRAMUSCULAR
Status: DISCONTINUED | OUTPATIENT
Start: 2021-03-17 | End: 2021-03-17 | Stop reason: HOSPADM

## 2021-03-17 RX ORDER — METOPROLOL TARTRATE 1 MG/ML
INJECTION, SOLUTION INTRAVENOUS PRN
Status: DISCONTINUED | OUTPATIENT
Start: 2021-03-17 | End: 2021-03-17 | Stop reason: SURG

## 2021-03-17 RX ORDER — HYDROMORPHONE HYDROCHLORIDE 1 MG/ML
0.2 INJECTION, SOLUTION INTRAMUSCULAR; INTRAVENOUS; SUBCUTANEOUS
Status: DISCONTINUED | OUTPATIENT
Start: 2021-03-17 | End: 2021-03-17 | Stop reason: HOSPADM

## 2021-03-17 RX ORDER — ONDANSETRON 2 MG/ML
4 INJECTION INTRAMUSCULAR; INTRAVENOUS
Status: DISCONTINUED | OUTPATIENT
Start: 2021-03-17 | End: 2021-03-17 | Stop reason: HOSPADM

## 2021-03-17 RX ORDER — HYDROMORPHONE HYDROCHLORIDE 1 MG/ML
0.4 INJECTION, SOLUTION INTRAMUSCULAR; INTRAVENOUS; SUBCUTANEOUS
Status: DISCONTINUED | OUTPATIENT
Start: 2021-03-17 | End: 2021-03-17 | Stop reason: HOSPADM

## 2021-03-17 RX ORDER — OXYCODONE HCL 5 MG/5 ML
10 SOLUTION, ORAL ORAL
Status: DISCONTINUED | OUTPATIENT
Start: 2021-03-17 | End: 2021-03-17 | Stop reason: HOSPADM

## 2021-03-17 RX ORDER — SODIUM CHLORIDE, SODIUM LACTATE, POTASSIUM CHLORIDE, CALCIUM CHLORIDE 600; 310; 30; 20 MG/100ML; MG/100ML; MG/100ML; MG/100ML
INJECTION, SOLUTION INTRAVENOUS CONTINUOUS
Status: ACTIVE | OUTPATIENT
Start: 2021-03-17 | End: 2021-03-17

## 2021-03-17 RX ORDER — DIPHENHYDRAMINE HYDROCHLORIDE 50 MG/ML
12.5 INJECTION INTRAMUSCULAR; INTRAVENOUS
Status: DISCONTINUED | OUTPATIENT
Start: 2021-03-17 | End: 2021-03-17 | Stop reason: HOSPADM

## 2021-03-17 RX ORDER — ONDANSETRON 2 MG/ML
INJECTION INTRAMUSCULAR; INTRAVENOUS PRN
Status: DISCONTINUED | OUTPATIENT
Start: 2021-03-17 | End: 2021-03-17 | Stop reason: SURG

## 2021-03-17 RX ORDER — OXYCODONE HCL 5 MG/5 ML
5 SOLUTION, ORAL ORAL
Status: DISCONTINUED | OUTPATIENT
Start: 2021-03-17 | End: 2021-03-17 | Stop reason: HOSPADM

## 2021-03-17 RX ORDER — DEXAMETHASONE SODIUM PHOSPHATE 4 MG/ML
INJECTION, SOLUTION INTRA-ARTICULAR; INTRALESIONAL; INTRAMUSCULAR; INTRAVENOUS; SOFT TISSUE PRN
Status: DISCONTINUED | OUTPATIENT
Start: 2021-03-17 | End: 2021-03-17 | Stop reason: SURG

## 2021-03-17 RX ORDER — HYDROMORPHONE HYDROCHLORIDE 1 MG/ML
1 INJECTION, SOLUTION INTRAMUSCULAR; INTRAVENOUS; SUBCUTANEOUS
Status: DISCONTINUED | OUTPATIENT
Start: 2021-03-17 | End: 2021-03-17 | Stop reason: HOSPADM

## 2021-03-17 RX ORDER — KETOROLAC TROMETHAMINE 30 MG/ML
INJECTION, SOLUTION INTRAMUSCULAR; INTRAVENOUS PRN
Status: DISCONTINUED | OUTPATIENT
Start: 2021-03-17 | End: 2021-03-17 | Stop reason: SURG

## 2021-03-17 RX ADMIN — LIDOCAINE HYDROCHLORIDE 40 MG: 20 INJECTION, SOLUTION EPIDURAL; INFILTRATION; INTRACAUDAL at 10:12

## 2021-03-17 RX ADMIN — SODIUM CHLORIDE, POTASSIUM CHLORIDE, SODIUM LACTATE AND CALCIUM CHLORIDE: 600; 310; 30; 20 INJECTION, SOLUTION INTRAVENOUS at 08:08

## 2021-03-17 RX ADMIN — PROPOFOL 150 MG: 10 INJECTION, EMULSION INTRAVENOUS at 10:12

## 2021-03-17 RX ADMIN — SUGAMMADEX 200 MG: 100 INJECTION, SOLUTION INTRAVENOUS at 10:19

## 2021-03-17 RX ADMIN — DEXAMETHASONE SODIUM PHOSPHATE 8 MG: 4 INJECTION, SOLUTION INTRA-ARTICULAR; INTRALESIONAL; INTRAMUSCULAR; INTRAVENOUS; SOFT TISSUE at 10:14

## 2021-03-17 RX ADMIN — KETOROLAC TROMETHAMINE 30 MG: 30 INJECTION, SOLUTION INTRAMUSCULAR at 10:19

## 2021-03-17 RX ADMIN — ONDANSETRON 4 MG: 2 INJECTION INTRAMUSCULAR; INTRAVENOUS at 10:19

## 2021-03-17 RX ADMIN — ROCURONIUM BROMIDE 50 MG: 10 INJECTION, SOLUTION INTRAVENOUS at 10:12

## 2021-03-17 RX ADMIN — METOPROLOL TARTRATE 5 MG: 5 INJECTION, SOLUTION INTRAVENOUS at 10:15

## 2021-03-17 ASSESSMENT — FIBROSIS 4 INDEX: FIB4 SCORE: 1.03

## 2021-03-17 ASSESSMENT — PAIN DESCRIPTION - PAIN TYPE
TYPE: SURGICAL PAIN
TYPE: SURGICAL PAIN

## 2021-03-17 ASSESSMENT — PAIN SCALES - GENERAL: PAIN_LEVEL: 0

## 2021-03-17 NOTE — ANESTHESIA POSTPROCEDURE EVALUATION
Patient: Donna Isaac    Procedure Summary     Date: 03/17/21 Room / Location: [place] [id number] / SURGERY [facility]    Anesthesia Start: 1007 Anesthesia Stop: 1028    Procedure: REMOVAL, HARDWARE - SYNDESMOTIC SCREW OF ANKLE. (Right Ankle) Diagnosis: (DISPLACED BIMALLEOLAR FRACTURE OF RIGHT LOWER LEG)    Surgeons: William Srinivasan M.D. Responsible Provider: Jermaine Mtz III, M.D.    Anesthesia Type: general ASA Status: 3          Final Anesthesia Type: general  Last vitals  BP   Blood Pressure: 155/91    Temp   36.5 °C (97.7 °F)    Pulse   74   Resp   13    SpO2   97 %      Anesthesia Post Evaluation    Patient location during evaluation: PACU  Patient participation: complete - patient participated  Level of consciousness: awake and alert  Pain score: 0    Airway patency: patent  Anesthetic complications: no  Cardiovascular status: hemodynamically stable  Respiratory status: acceptable  Hydration status: euvolemic    PONV: none          No complications documented.     Nurse Pain Score: 0 (NPRS)

## 2021-03-17 NOTE — DISCHARGE INSTRUCTIONS
ACTIVITY: Rest and take it easy for the first 24 hours.  A responsible adult is recommended to remain with you during that time.  It is normal to feel sleepy.  We encourage you to not do anything that requires balance, judgment or coordination.    MILD FLU-LIKE SYMPTOMS ARE NORMAL. YOU MAY EXPERIENCE GENERALIZED MUSCLE ACHES, THROAT IRRITATION, HEADACHE AND/OR SOME NAUSEA.    FOR 24 HOURS DO NOT:  Drive, operate machinery or run household appliances.  Drink beer or alcoholic beverages.   Make important decisions or sign legal documents.    SPECIAL INSTRUCTIONS:     ACTIVITY:  The patient should remain full weightbearing with the use of gait aids (crutches, walker, cane, etc).     PAIN CONTROL:  The patient has been prescribed a narcotic pain medication.  Side effects of narcotics pain medications include sedation and constipation.  To prevent constipation, it is recommended that the patient take an over the counter stool softener (such as Colace or Senna) and use laxatives as needed to prevent constipation.  Take these over the counter medications as directed by the packaging.  The patient may not drive while taking narcotic pain medication.  The patient should wean off their prescription pain medication by taking over the counter analgesics (such as Tylenol, Advil, Ibuprofen, etc).  Use caution when taking acetaminophen (Tylenol), as some narcotic medications contain acetaminophen.  The total dose of acetaminophen should not exceed 3000 mg daily.     WOUND CARE:  The patient has a surgical wound which was closed with staples or sutures.  These need to be removed 10-14 days after surgery.  If the patient is not in a splint or cast, the operative dressing should be removed 2 days after surgery, and dry gauze dressing changes should be performed daily after that.  You may shower when the operative dressing is removed.     SPLINT/CAST CARE:  If present, you should keep your splint or cast clean, dry, and intact.  You  should elevate your operative extremity to minimize swelling in your fingers or toes.  If the sensation in your fingers or toes of your operative extremity changes, or the fingers/toes change colors, or should your pain become too difficult to control, you should immediately elevate your operative extremity.  If these symptoms do not resolve after 30 minutes to 1 hour, you should seek medical care immediately.     CALL YOUR DOCTOR IF:  You have fever >101.5 degrees F, you have increased or concerning wound drainage, you notice a great deal of redness around your incision, your pain can no longer be controlled, the sensation in your fingers or toes changes and does not respond to elevation, your cast or splint becomes saturated (wet) or other concerns.  If you suffer a medical emergency, seek immediate medical care at your nearest Emergency Room.     DIET: To avoid nausea, slowly advance diet as tolerated, avoiding spicy or greasy foods for the first day.  Add more substantial food to your diet according to your physician's instructions.  Babies can be fed formula or breast milk as soon as they are hungry.  INCREASE FLUIDS AND FIBER TO AVOID CONSTIPATION.    FOLLOW-UP APPOINTMENT:  A follow-up appointment should be arranged with your doctor in *10-14 days*; call to schedule.    You should CALL YOUR PHYSICIAN if you develop:  Fever greater than 101 degrees F.  Pain not relieved by medication, or persistent nausea or vomiting.  Excessive bleeding (blood soaking through dressing) or unexpected drainage from the wound.  Extreme redness or swelling around the incision site, drainage of pus or foul smelling drainage.  Inability to urinate or empty your bladder within 8 hours.  Problems with breathing or chest pain.    You should call 911 if you develop problems with breathing or chest pain.  If you are unable to contact your doctor or surgical center, you should go to the nearest emergency room or urgent care center.   Physician's telephone #: Dr. Srinivasan [phone]    If any questions arise, call your doctor.  If your doctor is not available, please feel free to call the Surgical Center at [phone]. The Contact Center is open Monday through Friday 7AM to 5PM and may speak to a nurse at [phone], or toll free at [phone].     A registered nurse may call you a few days after your surgery to see how you are doing after your procedure.    MEDICATIONS: Resume taking daily medication.  Take prescribed pain medication with food.  If no medication is prescribed, you may take non-aspirin pain medication if needed.  PAIN MEDICATION CAN BE VERY CONSTIPATING.  Take a stool softener or laxative such as senokot, pericolace, or milk of magnesia if needed.       If your physician has prescribed pain medication that includes Acetaminophen (Tylenol), do not take additional Acetaminophen (Tylenol) while taking the prescribed medication.    Depression / Suicide Risk    As you are discharged from this [facility] facility, it is important to learn how to keep safe from harming yourself.    Recognize the warning signs:  · Abrupt changes in personality, positive or negative- including increase in energy   · Giving away possessions  · Change in eating patterns- significant weight changes-  positive or negative  · Change in sleeping patterns- unable to sleep or sleeping all the time   · Unwillingness or inability to communicate  · Depression  · Unusual sadness, discouragement and loneliness  · Talk of wanting to die  · Neglect of personal appearance   · Rebelliousness- reckless behavior  · Withdrawal from people/activities they love  · Confusion- inability to concentrate     If you or a loved one observes any of these behaviors or has concerns about self-harm, here's what you can do:  · Talk about it- your feelings and reasons for harming yourself  · Remove any means that you might use to hurt yourself (examples: pills, rope,  extension cords, firearm)  · Get professional help from the community (Mental Health, Substance Abuse, psychological counseling)  · Do not be alone:Call your Safe Contact- someone whom you trust who will be there for you.  · Call your local CRISIS HOTLINE 880-0556 or 362-109-2492  · Call your local Children's Mobile Crisis Response Team [place] (402) 187-1737 or www.[employer]  · Call the toll free National Suicide Prevention Hotlines   · National Suicide Prevention Lifeline 002-408-RBCL (2096)  · National Hope Line Network 800-SUICIDE (404-9914)

## 2021-03-17 NOTE — ANESTHESIA PREPROCEDURE EVALUATION
Relevant Problems   PULMONARY   (+) Asthma      NEURO   (+) History of abnormal Pap smear   (+) Seizure (HCC)      CARDIAC   (+) Aortic root dilatation (HCC)   (+) Chronic migraine without aura, not intractable   (+) Hypertension      GI   (+) Gastroesophageal reflux disease without esophagitis         (+) CKD (chronic kidney disease) stage 3, GFR 30-59 ml/min   (+) CKD (chronic kidney disease), stage III      ENDO   (+) Hypothyroidism       Physical Exam    Airway   Mallampati: II  TM distance: >3 FB  Neck ROM: full       Cardiovascular - normal exam  Rhythm: regular  Rate: normal  (-) murmur     Dental - normal exam           Pulmonary - normal exam  Breath sounds clear to auscultation     Abdominal   (+) obese     Neurological - normal exam         Other findings: Multiple medical problems with 55 medicine allergies            Anesthesia Plan    ASA 3       Plan - general       Airway plan will be LMA          Induction: intravenous    Postoperative Plan: Postoperative administration of opioids is intended.    Pertinent diagnostic labs and testing reviewed    Informed Consent:    Anesthetic plan and risks discussed with patient.    Use of blood products discussed with: patient whom consented to blood products.

## 2021-03-17 NOTE — ANESTHESIA TIME REPORT
Anesthesia Start and Stop Event Times     Date Time Event    3/17/2021 0957 Ready for Procedure     1007 Anesthesia Start     1028 Anesthesia Stop        Responsible Staff  03/17/21    Name Role Begin End    Jermaine Mtz III, M.D. Anesth 1007 1028        Preop Diagnosis (Free Text):  Pre-op Diagnosis     DISPLACED BIMALLEOLAR FRACTURE OF RIGHT LOWER LEG        Preop Diagnosis (Codes):    Post op Diagnosis  Displaced bimalleolar fracture of right lower leg      Premium Reason  Non-Premium    Comments:

## 2021-03-17 NOTE — PROGRESS NOTES
Patient is AO x4, RASS 0, and denies pain/nausea.  Surgical site CDI.  VSS on RA.  PICC line TKO.  POC reviewed and safety protocols in place.  Tolerating PO fluids.   Colin updated over the phone.  Report to Venessa ROBERTSON P2.

## 2021-03-17 NOTE — ANESTHESIA PROCEDURE NOTES
Airway    Date/Time: 3/17/2021 10:12 AM  Performed by: Jermaine Mtz III, M.D.  Authorized by: Jermaine Mtz III, M.D.     Location:  OR  Urgency:  Elective  Difficult Airway: No    Indications for Airway Management:  Anesthesia      Spontaneous Ventilation: absent    Sedation Level:  Deep  Preoxygenated: Yes    Final Airway Type:  Supraglottic airway  Final Supraglottic Airway:  Standard LMA    SGA Size:  3  Number of Attempts at Approach:  1

## 2021-03-17 NOTE — OP REPORT
DATE OF SERVICE:  03/17/2021     PREOPERATIVE DIAGNOSIS:  Painful syndesmotic screw, right ankle.     POSTOPERATIVE DIAGNOSIS:  Painful syndesmotic screw, right ankle.     PROCEDURE:  Removal of syndesmotic screw, right ankle.     SURGEON:  William Francisco MD     ASSISTANT:  None.     ESTIMATED BLOOD LOSS:  None.     INDICATIONS:  This is a patient status post ORIF of an ankle fracture who has   had persistent pain about the ankle.  Risks and benefits of syndesmotic screw   removal were discussed, which include but not limited to bleeding, infection,   neurovascular damage, pain, stiffness, malunion, nonunion, DVT, PE, MI, stroke   and death.  They understand all these risks and wished to proceed.     DESCRIPTION OF PROCEDURE:  The patient was sedated with LMA anesthesia and   administered preoperative antibiotics.  Right ankle was prepped and draped in   the usual sterile fashion.  Under fluoroscopic guidance, her screw was removed   through a percutaneous incision.  The patient tolerated the procedure well.     POSTOPERATIVE PLAN:  The patient will be weightbear as tolerated, discharged   home.  Follow up in two weeks' time for wound check.        ______________________________  WILLIAM FRANCISCO MD PLA/KEN/SANDRA    DD:  03/17/2021 10:19  DT:  03/17/2021 11:14    Job#:  [id number]

## 2021-03-17 NOTE — OR NURSING
Pt from [facility]. Small dressing to lateral ankle, cdi. picc line cleaned and flushed. Pt  at bedside, pt discharged. Pt reminded to take oral blood pressure medications when she gets home, dr. Mtz aware of vitals

## 2021-03-20 ENCOUNTER — IMMUNIZATION (OUTPATIENT)
Dept: FAMILY PLANNING/WOMEN'S HEALTH CLINIC | Facility: IMMUNIZATION CENTER | Age: 56
End: 2021-03-20
Attending: INTERNAL MEDICINE
Payer: MEDICARE

## 2021-03-20 DIAGNOSIS — Z23 NEED FOR VACCINATION: ICD-10-CM

## 2021-03-20 DIAGNOSIS — Z23 ENCOUNTER FOR VACCINATION: Primary | ICD-10-CM

## 2021-03-20 PROCEDURE — 0001A PFIZER SARS-COV-2 VACCINE: CPT | Performed by: INTERNAL MEDICINE

## 2021-03-20 PROCEDURE — 91300 PFIZER SARS-COV-2 VACCINE: CPT | Performed by: INTERNAL MEDICINE

## 2021-04-01 ENCOUNTER — HOSPITAL ENCOUNTER (OUTPATIENT)
Dept: LAB | Facility: MEDICAL CENTER | Age: 56
End: 2021-04-01
Attending: NURSE PRACTITIONER
Payer: MEDICARE

## 2021-04-01 LAB
ALBUMIN SERPL BCP-MCNC: 3.7 G/DL (ref 3.2–4.9)
ALBUMIN/GLOB SERPL: 1.3 G/DL
ALP SERPL-CCNC: 112 U/L (ref 30–99)
ALT SERPL-CCNC: 32 U/L (ref 2–50)
ANION GAP SERPL CALC-SCNC: 14 MMOL/L (ref 7–16)
APPEARANCE UR: CLEAR
AST SERPL-CCNC: 20 U/L (ref 12–45)
BASOPHILS # BLD AUTO: 0.4 % (ref 0–1.8)
BASOPHILS # BLD: 0.03 K/UL (ref 0–0.12)
BILIRUB SERPL-MCNC: 0.4 MG/DL (ref 0.1–1.5)
BILIRUB UR QL STRIP.AUTO: NEGATIVE
BUN SERPL-MCNC: 17 MG/DL (ref 8–22)
CALCIUM SERPL-MCNC: 8.8 MG/DL (ref 8.5–10.5)
CHLORIDE SERPL-SCNC: 99 MMOL/L (ref 96–112)
CO2 SERPL-SCNC: 20 MMOL/L (ref 20–33)
COLOR UR: YELLOW
CREAT SERPL-MCNC: 0.99 MG/DL (ref 0.5–1.4)
CRP SERPL HS-MCNC: 0.76 MG/DL (ref 0–0.75)
EOSINOPHIL # BLD AUTO: 0.05 K/UL (ref 0–0.51)
EOSINOPHIL NFR BLD: 0.6 % (ref 0–6.9)
ERYTHROCYTE [DISTWIDTH] IN BLOOD BY AUTOMATED COUNT: 61.2 FL (ref 35.9–50)
ERYTHROCYTE [SEDIMENTATION RATE] IN BLOOD BY WESTERGREN METHOD: 19 MM/HOUR (ref 0–30)
GLOBULIN SER CALC-MCNC: 2.8 G/DL (ref 1.9–3.5)
GLUCOSE SERPL-MCNC: 144 MG/DL (ref 65–99)
GLUCOSE UR STRIP.AUTO-MCNC: 500 MG/DL
HCT VFR BLD AUTO: 42.7 % (ref 37–47)
HGB BLD-MCNC: 13.4 G/DL (ref 12–16)
IMM GRANULOCYTES # BLD AUTO: 0.04 K/UL (ref 0–0.11)
IMM GRANULOCYTES NFR BLD AUTO: 0.5 % (ref 0–0.9)
KETONES UR STRIP.AUTO-MCNC: NEGATIVE MG/DL
LEUKOCYTE ESTERASE UR QL STRIP.AUTO: NEGATIVE
LYMPHOCYTES # BLD AUTO: 1.37 K/UL (ref 1–4.8)
LYMPHOCYTES NFR BLD: 17 % (ref 22–41)
MCH RBC QN AUTO: 31.8 PG (ref 27–33)
MCHC RBC AUTO-ENTMCNC: 31.4 G/DL (ref 33.6–35)
MCV RBC AUTO: 101.4 FL (ref 81.4–97.8)
MICRO URNS: ABNORMAL
MONOCYTES # BLD AUTO: 0.76 K/UL (ref 0–0.85)
MONOCYTES NFR BLD AUTO: 9.4 % (ref 0–13.4)
NEUTROPHILS # BLD AUTO: 5.83 K/UL (ref 2–7.15)
NEUTROPHILS NFR BLD: 72.1 % (ref 44–72)
NITRITE UR QL STRIP.AUTO: NEGATIVE
NRBC # BLD AUTO: 0 K/UL
NRBC BLD-RTO: 0 /100 WBC
PH UR STRIP.AUTO: 6 [PH] (ref 5–8)
PLATELET # BLD AUTO: 286 K/UL (ref 164–446)
PMV BLD AUTO: 10.7 FL (ref 9–12.9)
POTASSIUM SERPL-SCNC: 3.6 MMOL/L (ref 3.6–5.5)
PROT SERPL-MCNC: 6.5 G/DL (ref 6–8.2)
PROT UR QL STRIP: NEGATIVE MG/DL
RBC # BLD AUTO: 4.21 M/UL (ref 4.2–5.4)
RBC UR QL AUTO: NEGATIVE
SODIUM SERPL-SCNC: 133 MMOL/L (ref 135–145)
SP GR UR STRIP.AUTO: 1.01
UROBILINOGEN UR STRIP.AUTO-MCNC: 0.2 MG/DL
WBC # BLD AUTO: 8.1 K/UL (ref 4.8–10.8)

## 2021-04-01 PROCEDURE — 86140 C-REACTIVE PROTEIN: CPT

## 2021-04-01 PROCEDURE — 87077 CULTURE AEROBIC IDENTIFY: CPT

## 2021-04-01 PROCEDURE — 80053 COMPREHEN METABOLIC PANEL: CPT

## 2021-04-01 PROCEDURE — 85025 COMPLETE CBC W/AUTO DIFF WBC: CPT

## 2021-04-01 PROCEDURE — 87186 SC STD MICRODIL/AGAR DIL: CPT

## 2021-04-01 PROCEDURE — 87086 URINE CULTURE/COLONY COUNT: CPT

## 2021-04-01 PROCEDURE — 81003 URINALYSIS AUTO W/O SCOPE: CPT

## 2021-04-01 PROCEDURE — 36415 COLL VENOUS BLD VENIPUNCTURE: CPT

## 2021-04-01 PROCEDURE — 85652 RBC SED RATE AUTOMATED: CPT

## 2021-04-08 ENCOUNTER — HOSPITAL ENCOUNTER (OUTPATIENT)
Facility: MEDICAL CENTER | Age: 56
End: 2021-04-08
Attending: OTOLARYNGOLOGY
Payer: MEDICARE

## 2021-04-08 PROCEDURE — 87077 CULTURE AEROBIC IDENTIFY: CPT

## 2021-04-08 PROCEDURE — 87205 SMEAR GRAM STAIN: CPT

## 2021-04-08 PROCEDURE — 87186 SC STD MICRODIL/AGAR DIL: CPT

## 2021-04-08 PROCEDURE — 87075 CULTR BACTERIA EXCEPT BLOOD: CPT

## 2021-04-08 PROCEDURE — 87070 CULTURE OTHR SPECIMN AEROBIC: CPT

## 2021-04-09 LAB
GRAM STN SPEC: NORMAL
SIGNIFICANT IND 70042: NORMAL
SITE SITE: NORMAL
SOURCE SOURCE: NORMAL

## 2021-04-13 ENCOUNTER — OFFICE VISIT (OUTPATIENT)
Dept: NEUROLOGY | Facility: MEDICAL CENTER | Age: 56
End: 2021-04-13
Attending: NURSE PRACTITIONER
Payer: MEDICARE

## 2021-04-13 VITALS
SYSTOLIC BLOOD PRESSURE: 100 MMHG | OXYGEN SATURATION: 96 % | DIASTOLIC BLOOD PRESSURE: 60 MMHG | TEMPERATURE: 98.4 F | HEART RATE: 86 BPM | WEIGHT: 223 LBS | BODY MASS INDEX: 38.07 KG/M2 | HEIGHT: 64 IN | RESPIRATION RATE: 16 BRPM

## 2021-04-13 PROCEDURE — 700111 HCHG RX REV CODE 636 W/ 250 OVERRIDE (IP): Mod: JG | Performed by: NURSE PRACTITIONER

## 2021-04-13 PROCEDURE — 64615 CHEMODENERV MUSC MIGRAINE: CPT | Performed by: NURSE PRACTITIONER

## 2021-04-13 RX ADMIN — ONABOTULINUMTOXINA 155 UNITS: 200 INJECTION, POWDER, LYOPHILIZED, FOR SOLUTION INTRADERMAL; INTRAMUSCULAR at 09:47

## 2021-04-13 ASSESSMENT — FIBROSIS 4 INDEX: FIB4 SCORE: 0.69

## 2021-04-13 NOTE — PROGRESS NOTES
Subjective:      Donna Isaac is a 55 y.o. female who presents with Botox Injection (Chronic migraine)            HPI  Here for Botox #6 treatment today.    Only had 2-3 migraines intercurrently.  Many other headache days involving her active sinus inflammation.  Most recently underwent 21-days of IV antibiotics per Infectious Disease.    Now awaiting a culture to restart antibiotics.      Historically, fell and broke both of her legs, the right with a compound fracture.    Current Outpatient Medications   Medication Sig Dispense Refill   • mupirocin (BACTROBAN) 2 % Ointment Apply 1 Application topically 3 times a day.     • cetirizine (ZYRTEC) 10 MG Tab Take 20 mg by mouth 2 (two) times a day.     • Erenumab (AIMOVIG) 70 MG/ML Solution Auto-injector Inject 140 mg under the skin Q30 DAYS.     • AMILoride (MIDAMOR) 5 MG Tab Take 5 mg by mouth every morning.     • carvedilol (COREG) 25 MG Tab Take 25 mg by mouth 2 times a day, with meals.     • cyanocobalamin (VITAMIN B-12) 1000 MCG/ML Solution Inject 1,000 mcg into the shoulder, thigh, or buttocks every 30 days.     • Dexlansoprazole (DEXILANT) 60 MG CAPSULE DELAYED RELEASE delayed-release capsule Take 60 mg by mouth every morning.     • DULoxetine (CYMBALTA) 60 MG Cap DR Particles delayed-release capsule Take 60 mg by mouth every bedtime.     • estradiol (ESTRACE) 0.5 MG tablet Take 0.5 mg by mouth every morning.     • famotidine (PEPCID) 40 MG Tab Take 40 mg by mouth every bedtime.     • hydrOXYzine pamoate (VISTARIL) 25 MG Cap Take 25 mg by mouth every 6 hours as needed (for hives).     • liothyronine (CYTOMEL) 25 MCG Tab Take 25 mcg by mouth every bedtime.     • levothyroxine (SYNTHROID) 75 MCG Tab Take 75 mcg by mouth every evening.     • tizanidine (ZANAFLEX) 4 MG Tab Take 8 mg by mouth every 6 hours as needed.     • Somatropin (ZOMACTON) 10 MG Recon Soln Inject 0.3 mg under the skin every bedtime.     • Biotin 5000 MCG Cap Take 1 Cap by mouth every  day.     • Multiple Minerals (CALCIUM/MAGNESIUM/ZINC PO) Take 1 Tab by mouth every day.     • Cholecalciferol (VITAMIN D3) 2000 UNIT Cap Take 2,000 Units by mouth every day.     • Magnesium 400 MG Tab Take 400 mg by mouth every day.     • multivitamin (THERAGRAN) Tab Take 1 Tab by mouth every day.     • vitamin k 100 MCG tablet Take 100 mcg by mouth every day.     • spironolactone (ALDACTONE) 25 MG Tab Take 2 Tabs by mouth every day. (Patient taking differently: Take 50 mg by mouth every evening.) 30 Tab 3   • amitriptyline (ELAVIL) 10 MG Tab TAKE 2 TABLETS BY MOUTH EVERY NIGHT AT BEDTIME, AND 1 TABLET BY MOUTH EVERY MORNING (Patient taking differently: Take 10-20 mg by mouth 2 Times a Day. 10 mg in am and 20 mg in pm) 90 Tab 5   • doxazosin (CARDURA) 2 MG Tab Take 1 Tab by mouth every day. 90 Tab 3   • furosemide (LASIX) 20 MG Tab Take 1 Tab by mouth every day. 30 Tab 11   • JARDIANCE 10 MG Tab Take 10 mg by mouth every bedtime.     • dexamethasone (DECADRON) 0.5 MG Tab Take 0.5 mg by mouth every bedtime.     • losartan (COZAAR) 100 MG Tab Take 1 Tab by mouth every day. 30 Tab 2   • Frovatriptan Succinate (FROVA) 2.5 MG Tab Take 2.5 mg by mouth as needed (For migraines).     • montelukast (SINGULAIR) 10 MG Tab Take 10 mg by mouth every evening.     • pantoprazole (PROTONIX) 40 MG Tablet Delayed Response Take 40 mg by mouth every evening.     • calcitonin, salmon, (MIACALCIN) 200 UNIT/ACT Solution Spray 1 Spray in nose every bedtime.  12   • meloxicam (MOBIC) 15 MG tablet Take 15 mg by mouth every evening.     • colesevelam (WELCHOL) 625 MG Tab Take 625 mg by mouth 3 times a day, with meals.     • gabapentin (NEURONTIN) 400 MG Cap Take 1 Cap by mouth 3 times a day. 180 Cap 3     Current Facility-Administered Medications   Medication Dose Route Frequency Provider Last Rate Last Admin   • onabotulinum toxin A (Botox) injection 155 Units  155 Units Injection Once KONSTANTIN Del ToroPCHIKI WALKER       Objective:  "    /60 (BP Location: Right arm, Patient Position: Sitting, BP Cuff Size: Adult)   Pulse 86   Temp 36.9 °C (98.4 °F) (Temporal)   Resp 16   Ht 1.626 m (5' 4.02\")   Wt 101 kg (223 lb)   LMP  (LMP Unknown)   SpO2 96%   BMI 38.26 kg/m²      Physical Exam            Assessment/Plan:          Chronic Migraine:  Botox therapy has reduced patient’s migraines by more than 7 days and/or 100 hours per month.  Additionally pt has noted a reduction in the amount of medication they are taking to treat their migraines and/or they are having a reduction in intractable migraine/status migrainosis which can result in urgent care or ER visits.     Documentation of patient's symptoms with migraine are included in the initial note with this patient including the following:  Education/counseling/reduction in medications if pt has medication overuse headache;  Frequency of headaches is >15 days monthly with at least 8 migraines monthly;  Migraines include at least two of the following: worsened with activity or avoidance of activity with migraines (ie they go lie down), moderate to severe pain intensity, pulsing headache, unilateral headache AND they have either nausea or vomiting OR sensitivity to light and sound     Although this patient is responding to botox they are NOT migraine free, however, and it is my recommendation botox be continued at this time     I treated this patient in clinic today with BotoxA 155 units according to the dosing/injection paradigm currently mandated by the FDA for the management of chronic migraine. Specifically, I injected 5 units to the procerus, 5 units to the corrugators bilaterally, a total of 20 units to the frontalis musculature, 20 units to the temporalis bilaterally, 15 units to the occipitalis bilaterally, 10 units to the cervical paraspinals bilaterally and 15 units to the trapezius musculature bilaterally. The remainder of the Botox 200 units mixed but not administered was " discarded as wastage per FDA guidelines.      Return for follow-up in 12 weeks for 7th set of Botox.

## 2021-04-19 ENCOUNTER — HOSPITAL ENCOUNTER (OUTPATIENT)
Dept: RADIOLOGY | Facility: MEDICAL CENTER | Age: 56
End: 2021-04-19
Attending: INTERNAL MEDICINE
Payer: MEDICARE

## 2021-04-19 DIAGNOSIS — J01.80 OTHER ACUTE SINUSITIS, RECURRENCE NOT SPECIFIED: ICD-10-CM

## 2021-04-19 DIAGNOSIS — L03.90 CELLULITIS DUE TO MRSA: ICD-10-CM

## 2021-04-19 DIAGNOSIS — Z79.2 ENCOUNTER FOR LONG-TERM (CURRENT) USE OF ANTIBIOTICS: ICD-10-CM

## 2021-04-19 DIAGNOSIS — B96.5 ARTHRITIS DUE TO PSEUDOMONAS (HCC): ICD-10-CM

## 2021-04-19 DIAGNOSIS — M00.80 ARTHRITIS DUE TO PSEUDOMONAS (HCC): ICD-10-CM

## 2021-04-19 DIAGNOSIS — B95.62 CELLULITIS DUE TO MRSA: ICD-10-CM

## 2021-04-19 PROCEDURE — 36573 INSJ PICC RS&I 5 YR+: CPT

## 2021-04-19 NOTE — PROCEDURES
PICC Insertion Final 3CG    Consents confirmed, vessel patency confirmed with ultrasound. Risks and benefits of procedure explained to patient/family and education regarding central line associated bloodstream infections provided. Questions answered.     PICC placed in LUE per MD order with ultrasound guidance. 4 Fr, single lumen PICC placed in basilic vein after one attempt(s). 4 cc's of 1% lidocaine injected intradermally, 21 gauge microintroducer needle and modified Seldinger technique used. 48 cm catheter inserted with good blood return. Secured at 3 cm marker. Each lumen flushed without resistance with 10 mL 0.9% normal saline. PICC line secured with Biopatch and Tegaderm.    PICC placement is confirmed by nurse using 3CG technology. PICC line is appropriate for use at this time. Pt tolerated procedure wel.  Patient condition relayed to unit RN or ordering physician via this post procedure note in the EMR.     Effe[employer] Power PICC ref #[id number], Lot # [id number]

## 2021-04-21 ENCOUNTER — HOSPITAL ENCOUNTER (OUTPATIENT)
Facility: MEDICAL CENTER | Age: 56
End: 2021-04-21
Attending: INTERNAL MEDICINE
Payer: MEDICARE

## 2021-04-21 PROCEDURE — 85652 RBC SED RATE AUTOMATED: CPT

## 2021-04-21 PROCEDURE — 85025 COMPLETE CBC W/AUTO DIFF WBC: CPT

## 2021-04-21 PROCEDURE — 80053 COMPREHEN METABOLIC PANEL: CPT

## 2021-04-21 PROCEDURE — 86140 C-REACTIVE PROTEIN: CPT

## 2021-04-22 LAB
ALBUMIN SERPL BCP-MCNC: 3.2 G/DL (ref 3.2–4.9)
ALBUMIN/GLOB SERPL: 1.3 G/DL
ALP SERPL-CCNC: 107 U/L (ref 30–99)
ALT SERPL-CCNC: 21 U/L (ref 2–50)
ANION GAP SERPL CALC-SCNC: 10 MMOL/L (ref 7–16)
AST SERPL-CCNC: 25 U/L (ref 12–45)
BASOPHILS # BLD AUTO: 0.4 % (ref 0–1.8)
BASOPHILS # BLD: 0.02 K/UL (ref 0–0.12)
BILIRUB SERPL-MCNC: <0.2 MG/DL (ref 0.1–1.5)
BUN SERPL-MCNC: 16 MG/DL (ref 8–22)
CALCIUM SERPL-MCNC: 8.1 MG/DL (ref 8.5–10.5)
CHLORIDE SERPL-SCNC: 100 MMOL/L (ref 96–112)
CO2 SERPL-SCNC: 24 MMOL/L (ref 20–33)
CREAT SERPL-MCNC: 0.83 MG/DL (ref 0.5–1.4)
CRP SERPL HS-MCNC: 0.48 MG/DL (ref 0–0.75)
EOSINOPHIL # BLD AUTO: 0.07 K/UL (ref 0–0.51)
EOSINOPHIL NFR BLD: 1.5 % (ref 0–6.9)
ERYTHROCYTE [DISTWIDTH] IN BLOOD BY AUTOMATED COUNT: 59.9 FL (ref 35.9–50)
ERYTHROCYTE [SEDIMENTATION RATE] IN BLOOD BY WESTERGREN METHOD: 15 MM/HOUR (ref 0–30)
GLOBULIN SER CALC-MCNC: 2.4 G/DL (ref 1.9–3.5)
GLUCOSE SERPL-MCNC: 83 MG/DL (ref 65–99)
HCT VFR BLD AUTO: 35.9 % (ref 37–47)
HGB BLD-MCNC: 11.2 G/DL (ref 12–16)
IMM GRANULOCYTES # BLD AUTO: 0.02 K/UL (ref 0–0.11)
IMM GRANULOCYTES NFR BLD AUTO: 0.4 % (ref 0–0.9)
LYMPHOCYTES # BLD AUTO: 1.03 K/UL (ref 1–4.8)
LYMPHOCYTES NFR BLD: 22 % (ref 22–41)
MCH RBC QN AUTO: 31.3 PG (ref 27–33)
MCHC RBC AUTO-ENTMCNC: 31.2 G/DL (ref 33.6–35)
MCV RBC AUTO: 100.3 FL (ref 81.4–97.8)
MONOCYTES # BLD AUTO: 0.5 K/UL (ref 0–0.85)
MONOCYTES NFR BLD AUTO: 10.7 % (ref 0–13.4)
NEUTROPHILS # BLD AUTO: 3.05 K/UL (ref 2–7.15)
NEUTROPHILS NFR BLD: 65 % (ref 44–72)
NRBC # BLD AUTO: 0 K/UL
NRBC BLD-RTO: 0 /100 WBC
PLATELET # BLD AUTO: 228 K/UL (ref 164–446)
PMV BLD AUTO: 10.2 FL (ref 9–12.9)
POTASSIUM SERPL-SCNC: 3.7 MMOL/L (ref 3.6–5.5)
PROT SERPL-MCNC: 5.6 G/DL (ref 6–8.2)
RBC # BLD AUTO: 3.58 M/UL (ref 4.2–5.4)
SODIUM SERPL-SCNC: 134 MMOL/L (ref 135–145)
WBC # BLD AUTO: 4.7 K/UL (ref 4.8–10.8)

## 2021-04-27 ENCOUNTER — APPOINTMENT (OUTPATIENT)
Dept: RADIOLOGY | Facility: MEDICAL CENTER | Age: 56
End: 2021-04-27
Attending: FAMILY MEDICINE
Payer: MEDICARE

## 2021-04-27 ENCOUNTER — APPOINTMENT (OUTPATIENT)
Dept: RADIOLOGY | Facility: MEDICAL CENTER | Age: 56
End: 2021-04-27
Attending: EMERGENCY MEDICINE
Payer: MEDICARE

## 2021-04-27 ENCOUNTER — HOSPITAL ENCOUNTER (OUTPATIENT)
Facility: MEDICAL CENTER | Age: 56
End: 2021-04-28
Attending: EMERGENCY MEDICINE | Admitting: FAMILY MEDICINE
Payer: MEDICARE

## 2021-04-27 DIAGNOSIS — I10 ESSENTIAL HYPERTENSION: ICD-10-CM

## 2021-04-27 DIAGNOSIS — R07.9 CHEST PAIN, UNSPECIFIED TYPE: ICD-10-CM

## 2021-04-27 PROBLEM — R30.0 DYSURIA: Status: ACTIVE | Noted: 2021-04-27

## 2021-04-27 PROBLEM — J01.90 ACUTE SINUSITIS: Status: ACTIVE | Noted: 2021-04-27

## 2021-04-27 PROBLEM — R79.89 ELEVATED LFTS: Status: ACTIVE | Noted: 2021-04-27

## 2021-04-27 PROBLEM — R11.0 NAUSEA: Status: ACTIVE | Noted: 2021-04-27

## 2021-04-27 PROBLEM — F19.20 STEROID DEPENDENCE (HCC): Status: ACTIVE | Noted: 2021-04-27

## 2021-04-27 LAB
ALBUMIN SERPL BCP-MCNC: 2.8 G/DL (ref 3.2–4.9)
ALBUMIN/GLOB SERPL: 1.4 G/DL
ALP SERPL-CCNC: 130 U/L (ref 30–99)
ALT SERPL-CCNC: 46 U/L (ref 2–50)
ANION GAP SERPL CALC-SCNC: 9 MMOL/L (ref 7–16)
AST SERPL-CCNC: 71 U/L (ref 12–45)
BASOPHILS # BLD AUTO: 0.7 % (ref 0–1.8)
BASOPHILS # BLD: 0.03 K/UL (ref 0–0.12)
BILIRUB SERPL-MCNC: 0.5 MG/DL (ref 0.1–1.5)
BUN SERPL-MCNC: 14 MG/DL (ref 8–22)
CALCIUM SERPL-MCNC: 7.8 MG/DL (ref 8.4–10.2)
CHLORIDE SERPL-SCNC: 102 MMOL/L (ref 96–112)
CO2 SERPL-SCNC: 27 MMOL/L (ref 20–33)
CREAT SERPL-MCNC: 0.7 MG/DL (ref 0.5–1.4)
D DIMER PPP IA.FEU-MCNC: 3.2 UG/ML (FEU) (ref 0–0.5)
EKG IMPRESSION: NORMAL
EOSINOPHIL # BLD AUTO: 0.18 K/UL (ref 0–0.51)
EOSINOPHIL NFR BLD: 4.3 % (ref 0–6.9)
ERYTHROCYTE [DISTWIDTH] IN BLOOD BY AUTOMATED COUNT: 56.6 FL (ref 35.9–50)
EST. AVERAGE GLUCOSE BLD GHB EST-MCNC: 126 MG/DL
FLUAV RNA SPEC QL NAA+PROBE: NEGATIVE
FLUBV RNA SPEC QL NAA+PROBE: NEGATIVE
GLOBULIN SER CALC-MCNC: 2 G/DL (ref 1.9–3.5)
GLUCOSE SERPL-MCNC: 128 MG/DL (ref 65–99)
HBA1C MFR BLD: 6 % (ref 4–5.6)
HCT VFR BLD AUTO: 39.4 % (ref 37–47)
HGB BLD-MCNC: 12.6 G/DL (ref 12–16)
IMM GRANULOCYTES # BLD AUTO: 0.02 K/UL (ref 0–0.11)
IMM GRANULOCYTES NFR BLD AUTO: 0.5 % (ref 0–0.9)
LIPASE SERPL-CCNC: 85 U/L (ref 7–58)
LYMPHOCYTES # BLD AUTO: 1.13 K/UL (ref 1–4.8)
LYMPHOCYTES NFR BLD: 26.9 % (ref 22–41)
MCH RBC QN AUTO: 31 PG (ref 27–33)
MCHC RBC AUTO-ENTMCNC: 32 G/DL (ref 33.6–35)
MCV RBC AUTO: 96.8 FL (ref 81.4–97.8)
MONOCYTES # BLD AUTO: 0.5 K/UL (ref 0–0.85)
MONOCYTES NFR BLD AUTO: 11.9 % (ref 0–13.4)
NEUTROPHILS # BLD AUTO: 2.34 K/UL (ref 2–7.15)
NEUTROPHILS NFR BLD: 55.7 % (ref 44–72)
NRBC # BLD AUTO: 0 K/UL
NRBC BLD-RTO: 0 /100 WBC
PLATELET # BLD AUTO: 185 K/UL (ref 164–446)
PMV BLD AUTO: 9.5 FL (ref 9–12.9)
POTASSIUM SERPL-SCNC: 3.2 MMOL/L (ref 3.6–5.5)
PROCALCITONIN SERPL-MCNC: 0.13 NG/ML
PROT SERPL-MCNC: 4.8 G/DL (ref 6–8.2)
RBC # BLD AUTO: 4.07 M/UL (ref 4.2–5.4)
RSV RNA SPEC QL NAA+PROBE: NEGATIVE
SARS-COV-2 RNA RESP QL NAA+PROBE: NOTDETECTED
SODIUM SERPL-SCNC: 138 MMOL/L (ref 135–145)
SPECIMEN SOURCE: NORMAL
TROPONIN T SERPL-MCNC: 10 NG/L (ref 6–19)
TROPONIN T SERPL-MCNC: 8 NG/L (ref 6–19)
TROPONIN T SERPL-MCNC: 8 NG/L (ref 6–19)
WBC # BLD AUTO: 4.2 K/UL (ref 4.8–10.8)

## 2021-04-27 PROCEDURE — 71275 CT ANGIOGRAPHY CHEST: CPT | Mod: ME

## 2021-04-27 PROCEDURE — 700102 HCHG RX REV CODE 250 W/ 637 OVERRIDE(OP): Performed by: FAMILY MEDICINE

## 2021-04-27 PROCEDURE — A9270 NON-COVERED ITEM OR SERVICE: HCPCS | Performed by: FAMILY MEDICINE

## 2021-04-27 PROCEDURE — 96376 TX/PRO/DX INJ SAME DRUG ADON: CPT | Mod: XU

## 2021-04-27 PROCEDURE — 80053 COMPREHEN METABOLIC PANEL: CPT

## 2021-04-27 PROCEDURE — 83036 HEMOGLOBIN GLYCOSYLATED A1C: CPT

## 2021-04-27 PROCEDURE — 84145 PROCALCITONIN (PCT): CPT

## 2021-04-27 PROCEDURE — 0241U HCHG SARS-COV-2 COVID-19 NFCT DS RESP RNA 4 TRGT MIC: CPT

## 2021-04-27 PROCEDURE — 700111 HCHG RX REV CODE 636 W/ 250 OVERRIDE (IP): Performed by: FAMILY MEDICINE

## 2021-04-27 PROCEDURE — 700117 HCHG RX CONTRAST REV CODE 255: Performed by: FAMILY MEDICINE

## 2021-04-27 PROCEDURE — 85379 FIBRIN DEGRADATION QUANT: CPT

## 2021-04-27 PROCEDURE — 71045 X-RAY EXAM CHEST 1 VIEW: CPT

## 2021-04-27 PROCEDURE — 99220 PR INITIAL OBSERVATION CARE,LEVL III: CPT | Performed by: FAMILY MEDICINE

## 2021-04-27 PROCEDURE — G0378 HOSPITAL OBSERVATION PER HR: HCPCS

## 2021-04-27 PROCEDURE — 96367 TX/PROPH/DG ADDL SEQ IV INF: CPT

## 2021-04-27 PROCEDURE — 700105 HCHG RX REV CODE 258: Performed by: FAMILY MEDICINE

## 2021-04-27 PROCEDURE — 96375 TX/PRO/DX INJ NEW DRUG ADDON: CPT | Mod: XU

## 2021-04-27 PROCEDURE — 84484 ASSAY OF TROPONIN QUANT: CPT

## 2021-04-27 PROCEDURE — 93005 ELECTROCARDIOGRAM TRACING: CPT

## 2021-04-27 PROCEDURE — 99285 EMERGENCY DEPT VISIT HI MDM: CPT

## 2021-04-27 PROCEDURE — 87040 BLOOD CULTURE FOR BACTERIA: CPT | Mod: 91

## 2021-04-27 PROCEDURE — 85025 COMPLETE CBC W/AUTO DIFF WBC: CPT

## 2021-04-27 PROCEDURE — 700105 HCHG RX REV CODE 258: Performed by: HOSPITALIST

## 2021-04-27 PROCEDURE — 700102 HCHG RX REV CODE 250 W/ 637 OVERRIDE(OP): Performed by: INTERNAL MEDICINE

## 2021-04-27 PROCEDURE — 700111 HCHG RX REV CODE 636 W/ 250 OVERRIDE (IP): Performed by: HOSPITALIST

## 2021-04-27 PROCEDURE — 93005 ELECTROCARDIOGRAM TRACING: CPT | Performed by: EMERGENCY MEDICINE

## 2021-04-27 PROCEDURE — A9270 NON-COVERED ITEM OR SERVICE: HCPCS | Performed by: INTERNAL MEDICINE

## 2021-04-27 PROCEDURE — 83690 ASSAY OF LIPASE: CPT

## 2021-04-27 PROCEDURE — 96365 THER/PROPH/DIAG IV INF INIT: CPT | Mod: XU

## 2021-04-27 PROCEDURE — C9803 HOPD COVID-19 SPEC COLLECT: HCPCS | Performed by: EMERGENCY MEDICINE

## 2021-04-27 RX ORDER — ATORVASTATIN CALCIUM 40 MG/1
40 TABLET, FILM COATED ORAL EVERY EVENING
Status: DISCONTINUED | OUTPATIENT
Start: 2021-04-27 | End: 2021-04-28 | Stop reason: HOSPADM

## 2021-04-27 RX ORDER — AMITRIPTYLINE HYDROCHLORIDE 10 MG/1
20 TABLET, FILM COATED ORAL
Status: DISCONTINUED | OUTPATIENT
Start: 2021-04-27 | End: 2021-04-28 | Stop reason: HOSPADM

## 2021-04-27 RX ORDER — METHYLPREDNISOLONE SODIUM SUCCINATE 125 MG/2ML
125 INJECTION, POWDER, LYOPHILIZED, FOR SOLUTION INTRAMUSCULAR; INTRAVENOUS ONCE
Status: DISCONTINUED | OUTPATIENT
Start: 2021-04-27 | End: 2021-04-27

## 2021-04-27 RX ORDER — DEXAMETHASONE 0.5 MG/1
0.5 TABLET ORAL
Status: DISCONTINUED | OUTPATIENT
Start: 2021-04-28 | End: 2021-04-28 | Stop reason: HOSPADM

## 2021-04-27 RX ORDER — DOXAZOSIN 2 MG/1
2 TABLET ORAL
Status: DISCONTINUED | OUTPATIENT
Start: 2021-04-28 | End: 2021-04-28 | Stop reason: HOSPADM

## 2021-04-27 RX ORDER — CETIRIZINE HYDROCHLORIDE 10 MG/1
20 TABLET ORAL
Status: DISCONTINUED | OUTPATIENT
Start: 2021-04-27 | End: 2021-04-28 | Stop reason: HOSPADM

## 2021-04-27 RX ORDER — LEVOTHYROXINE SODIUM 0.05 MG/1
75 TABLET ORAL EVERY EVENING
Status: DISCONTINUED | OUTPATIENT
Start: 2021-04-27 | End: 2021-04-28 | Stop reason: HOSPADM

## 2021-04-27 RX ORDER — FAMOTIDINE 20 MG/1
40 TABLET, FILM COATED ORAL
Status: DISCONTINUED | OUTPATIENT
Start: 2021-04-27 | End: 2021-04-28 | Stop reason: HOSPADM

## 2021-04-27 RX ORDER — ONDANSETRON 2 MG/ML
4 INJECTION INTRAMUSCULAR; INTRAVENOUS EVERY 4 HOURS PRN
Status: DISCONTINUED | OUTPATIENT
Start: 2021-04-27 | End: 2021-04-28 | Stop reason: HOSPADM

## 2021-04-27 RX ORDER — ONDANSETRON 4 MG/1
4 TABLET, ORALLY DISINTEGRATING ORAL EVERY 4 HOURS PRN
Status: DISCONTINUED | OUTPATIENT
Start: 2021-04-27 | End: 2021-04-28 | Stop reason: HOSPADM

## 2021-04-27 RX ORDER — ASPIRIN 325 MG
325 TABLET ORAL DAILY
Status: DISCONTINUED | OUTPATIENT
Start: 2021-04-27 | End: 2021-04-28

## 2021-04-27 RX ORDER — HYDROXYZINE HYDROCHLORIDE 25 MG/1
25 TABLET, FILM COATED ORAL EVERY 6 HOURS PRN
Status: DISCONTINUED | OUTPATIENT
Start: 2021-04-27 | End: 2021-04-28 | Stop reason: HOSPADM

## 2021-04-27 RX ORDER — MONTELUKAST SODIUM 10 MG/1
10 TABLET ORAL EVERY EVENING
Status: DISCONTINUED | OUTPATIENT
Start: 2021-04-27 | End: 2021-04-28 | Stop reason: HOSPADM

## 2021-04-27 RX ORDER — LIOTHYRONINE SODIUM 5 UG/1
25 TABLET ORAL
Status: DISCONTINUED | OUTPATIENT
Start: 2021-04-27 | End: 2021-04-28 | Stop reason: HOSPADM

## 2021-04-27 RX ORDER — OMEPRAZOLE 20 MG/1
20 CAPSULE, DELAYED RELEASE ORAL DAILY
Status: DISCONTINUED | OUTPATIENT
Start: 2021-04-27 | End: 2021-04-28 | Stop reason: HOSPADM

## 2021-04-27 RX ORDER — SODIUM CHLORIDE 9 MG/ML
INJECTION, SOLUTION INTRAVENOUS CONTINUOUS
Status: DISCONTINUED | OUTPATIENT
Start: 2021-04-27 | End: 2021-04-28 | Stop reason: HOSPADM

## 2021-04-27 RX ORDER — DULOXETIN HYDROCHLORIDE 30 MG/1
60 CAPSULE, DELAYED RELEASE ORAL
Status: DISCONTINUED | OUTPATIENT
Start: 2021-04-27 | End: 2021-04-28 | Stop reason: HOSPADM

## 2021-04-27 RX ORDER — VITAMIN B COMPLEX
2000 TABLET ORAL DAILY
Status: DISCONTINUED | OUTPATIENT
Start: 2021-04-28 | End: 2021-04-28 | Stop reason: HOSPADM

## 2021-04-27 RX ORDER — MEROPENEM 1 G/1
1 INJECTION, POWDER, FOR SOLUTION INTRAVENOUS EVERY 8 HOURS
Status: ON HOLD | COMMUNITY
Start: 2021-04-20 | End: 2021-06-03

## 2021-04-27 RX ORDER — AMITRIPTYLINE HYDROCHLORIDE 10 MG/1
10 TABLET, FILM COATED ORAL EVERY MORNING
Status: DISCONTINUED | OUTPATIENT
Start: 2021-04-28 | End: 2021-04-28 | Stop reason: HOSPADM

## 2021-04-27 RX ORDER — GABAPENTIN 400 MG/1
400 CAPSULE ORAL 3 TIMES DAILY
Status: DISCONTINUED | OUTPATIENT
Start: 2021-04-27 | End: 2021-04-28 | Stop reason: HOSPADM

## 2021-04-27 RX ORDER — FUROSEMIDE 40 MG/1
20 TABLET ORAL DAILY
Status: DISCONTINUED | OUTPATIENT
Start: 2021-04-27 | End: 2021-04-28

## 2021-04-27 RX ORDER — ACETAMINOPHEN 325 MG/1
650 TABLET ORAL EVERY 4 HOURS PRN
Status: DISCONTINUED | OUTPATIENT
Start: 2021-04-27 | End: 2021-04-28 | Stop reason: HOSPADM

## 2021-04-27 RX ORDER — AMITRIPTYLINE HYDROCHLORIDE 10 MG/1
10 TABLET, FILM COATED ORAL EVERY EVENING
Status: DISCONTINUED | OUTPATIENT
Start: 2021-04-27 | End: 2021-04-27

## 2021-04-27 RX ORDER — METHYLPREDNISOLONE SODIUM SUCCINATE 40 MG/ML
40 INJECTION, POWDER, LYOPHILIZED, FOR SOLUTION INTRAMUSCULAR; INTRAVENOUS ONCE
Status: COMPLETED | OUTPATIENT
Start: 2021-04-27 | End: 2021-04-27

## 2021-04-27 RX ORDER — POTASSIUM CHLORIDE 7.45 MG/ML
10 INJECTION INTRAVENOUS
Status: DISPENSED | OUTPATIENT
Start: 2021-04-27 | End: 2021-04-27

## 2021-04-27 RX ORDER — DIPHENHYDRAMINE HYDROCHLORIDE 50 MG/ML
50 INJECTION INTRAMUSCULAR; INTRAVENOUS ONCE
Status: COMPLETED | OUTPATIENT
Start: 2021-04-27 | End: 2021-04-27

## 2021-04-27 RX ORDER — ERAVACYCLINE 100 MG/1
100 INJECTION, POWDER, LYOPHILIZED, FOR SOLUTION INTRAVENOUS 2 TIMES DAILY
Status: ON HOLD | COMMUNITY
Start: 2021-04-20 | End: 2021-06-03

## 2021-04-27 RX ORDER — COLESEVELAM 180 1/1
625 TABLET ORAL
Status: DISCONTINUED | OUTPATIENT
Start: 2021-04-27 | End: 2021-04-28 | Stop reason: HOSPADM

## 2021-04-27 RX ORDER — PROMETHAZINE HYDROCHLORIDE 25 MG/1
12.5-25 SUPPOSITORY RECTAL EVERY 4 HOURS PRN
Status: DISCONTINUED | OUTPATIENT
Start: 2021-04-27 | End: 2021-04-28 | Stop reason: HOSPADM

## 2021-04-27 RX ORDER — MELOXICAM 7.5 MG/1
15 TABLET ORAL EVERY EVENING
Status: DISCONTINUED | OUTPATIENT
Start: 2021-04-27 | End: 2021-04-28

## 2021-04-27 RX ORDER — ERAVACYCLINE 50 MG/1
INJECTION, POWDER, LYOPHILIZED, FOR SOLUTION INTRAVENOUS
Status: SHIPPED | COMMUNITY
Start: 2021-04-20 | End: 2021-04-27

## 2021-04-27 RX ORDER — ESTRADIOL 1 MG/1
0.5 TABLET ORAL EVERY MORNING
Status: DISCONTINUED | OUTPATIENT
Start: 2021-04-28 | End: 2021-04-28 | Stop reason: HOSPADM

## 2021-04-27 RX ORDER — CARVEDILOL 6.25 MG/1
25 TABLET ORAL 2 TIMES DAILY WITH MEALS
Status: DISCONTINUED | OUTPATIENT
Start: 2021-04-27 | End: 2021-04-28 | Stop reason: HOSPADM

## 2021-04-27 RX ORDER — DIPHENHYDRAMINE HYDROCHLORIDE 50 MG/ML
50 INJECTION INTRAMUSCULAR; INTRAVENOUS ONCE
Status: DISCONTINUED | OUTPATIENT
Start: 2021-04-27 | End: 2021-04-27

## 2021-04-27 RX ORDER — BISACODYL 10 MG
10 SUPPOSITORY, RECTAL RECTAL
Status: DISCONTINUED | OUTPATIENT
Start: 2021-04-27 | End: 2021-04-28 | Stop reason: HOSPADM

## 2021-04-27 RX ORDER — POLYETHYLENE GLYCOL 3350 17 G/17G
1 POWDER, FOR SOLUTION ORAL
Status: DISCONTINUED | OUTPATIENT
Start: 2021-04-27 | End: 2021-04-28 | Stop reason: HOSPADM

## 2021-04-27 RX ORDER — MEROPENEM 1 G/1
1 INJECTION, POWDER, FOR SOLUTION INTRAVENOUS EVERY 8 HOURS
Status: DISCONTINUED | OUTPATIENT
Start: 2021-04-27 | End: 2021-04-27

## 2021-04-27 RX ORDER — ENALAPRILAT 1.25 MG/ML
1.25 INJECTION INTRAVENOUS EVERY 6 HOURS PRN
Status: DISCONTINUED | OUTPATIENT
Start: 2021-04-27 | End: 2021-04-28 | Stop reason: HOSPADM

## 2021-04-27 RX ADMIN — POTASSIUM CHLORIDE 10 MEQ: 7.46 INJECTION, SOLUTION INTRAVENOUS at 20:26

## 2021-04-27 RX ADMIN — LIOTHYRONINE SODIUM 25 MCG: 5 TABLET ORAL at 20:27

## 2021-04-27 RX ADMIN — IOHEXOL 60 ML: 350 INJECTION, SOLUTION INTRAVENOUS at 22:34

## 2021-04-27 RX ADMIN — DULOXETINE HYDROCHLORIDE 60 MG: 30 CAPSULE, DELAYED RELEASE ORAL at 20:31

## 2021-04-27 RX ADMIN — ATORVASTATIN CALCIUM 40 MG: 40 TABLET, FILM COATED ORAL at 18:40

## 2021-04-27 RX ADMIN — Medication 400 MG: at 20:29

## 2021-04-27 RX ADMIN — MONTELUKAST 10 MG: 10 TABLET, FILM COATED ORAL at 18:40

## 2021-04-27 RX ADMIN — METHYLPREDNISOLONE SODIUM SUCCINATE 40 MG: 40 INJECTION, POWDER, FOR SOLUTION INTRAMUSCULAR; INTRAVENOUS at 20:58

## 2021-04-27 RX ADMIN — POTASSIUM CHLORIDE 10 MEQ: 7.46 INJECTION, SOLUTION INTRAVENOUS at 18:36

## 2021-04-27 RX ADMIN — METHYLPREDNISOLONE SODIUM SUCCINATE 40 MG: 40 INJECTION, POWDER, FOR SOLUTION INTRAMUSCULAR; INTRAVENOUS at 18:12

## 2021-04-27 RX ADMIN — GABAPENTIN 400 MG: 400 CAPSULE ORAL at 20:27

## 2021-04-27 RX ADMIN — DAPTOMYCIN 610 MG: 350 INJECTION, POWDER, LYOPHILIZED, FOR SOLUTION INTRAVENOUS at 21:02

## 2021-04-27 RX ADMIN — ACETAMINOPHEN 650 MG: 325 TABLET ORAL at 22:41

## 2021-04-27 RX ADMIN — CETIRIZINE HYDROCHLORIDE 20 MG: 10 TABLET, FILM COATED ORAL at 20:29

## 2021-04-27 RX ADMIN — LEVOTHYROXINE SODIUM 75 MCG: 0.05 TABLET ORAL at 18:38

## 2021-04-27 RX ADMIN — SODIUM CHLORIDE: 9 INJECTION, SOLUTION INTRAVENOUS at 18:18

## 2021-04-27 RX ADMIN — DIPHENHYDRAMINE HYDROCHLORIDE 50 MG: 50 INJECTION, SOLUTION INTRAMUSCULAR; INTRAVENOUS at 20:58

## 2021-04-27 RX ADMIN — MEROPENEM 1 G: 1 INJECTION, POWDER, FOR SOLUTION INTRAVENOUS at 22:48

## 2021-04-27 RX ADMIN — ASPIRIN 325 MG ORAL TABLET 325 MG: 325 PILL ORAL at 20:30

## 2021-04-27 RX ADMIN — FAMOTIDINE 40 MG: 20 TABLET ORAL at 20:58

## 2021-04-27 RX ADMIN — MELOXICAM 15 MG: 7.5 TABLET ORAL at 18:39

## 2021-04-27 RX ADMIN — COLESEVELAM HYDROCHLORIDE 625 MG: 625 TABLET, FILM COATED ORAL at 18:37

## 2021-04-27 RX ADMIN — AMITRIPTYLINE HYDROCHLORIDE 20 MG: 10 TABLET, FILM COATED ORAL at 21:00

## 2021-04-27 ASSESSMENT — ENCOUNTER SYMPTOMS
VOMITING: 0
SINUS PAIN: 1
DIARRHEA: 1
COUGH: 0
DIZZINESS: 1
NAUSEA: 1
SHORTNESS OF BREATH: 0
ABDOMINAL PAIN: 1
CHILLS: 0
FEVER: 0
BACK PAIN: 1

## 2021-04-27 ASSESSMENT — PAIN DESCRIPTION - PAIN TYPE
TYPE: ACUTE PAIN
TYPE: ACUTE PAIN

## 2021-04-27 ASSESSMENT — FIBROSIS 4 INDEX
FIB4 SCORE: 1.34
FIB4 SCORE: 3.17
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
## 2021-04-27 NOTE — ED NOTES
"Pt SOB with changes in position and movement. Placed pt on 2L O2 NC for comfort, states \"that's much better.\" Repositioned BP cuff that had slid down arm. Pt call light in reach, will continue to monitor.   "

## 2021-04-27 NOTE — ED NOTES
Pt rounding, resting comfortably in room with lights off. Call light in reach, will continue to monitor.

## 2021-04-27 NOTE — ED NOTES
Med Rec complete per pt  Allergies Reviewed    Pt started 5 week courses of MERREM and XERAVA on 4/20

## 2021-04-27 NOTE — ED NOTES
Pt reports having approx 6 unopened vials, requiring reconstitution, of XERAVA family can bring in, if needed for hospital stay.

## 2021-04-27 NOTE — ED NOTES
"Pt states \"I haven't had an appetite for a couple of weeks, I've been forcing myself to have a couple of bites a day.\" Chest pain began \"a week ago after starting the IV\" and pt endorses that the pain is \"constant.\" Pt states SOB is only with exertion. Pt states she \"has a hard time getting the IV fluid to go in, last night it took 4 hours for it to go in.\"   "

## 2021-04-27 NOTE — ED TRIAGE NOTES
Chief Complaint   Patient presents with   • Chest Pain     started about two days ago, pressure located over mid chest, intermittant SOB   • Vascular Access Problem     getting abx infusions for the past week, noticed PICC line was having difficulty yesterday     Pt arrived w/ above concern, pt PICC line appears to have blood clotted in tube. EKG done in triage, mask in place.    Pt appears pale in triage and is hypotensive

## 2021-04-28 ENCOUNTER — APPOINTMENT (OUTPATIENT)
Dept: CARDIOLOGY | Facility: MEDICAL CENTER | Age: 56
End: 2021-04-28
Attending: FAMILY MEDICINE
Payer: MEDICARE

## 2021-04-28 VITALS
BODY MASS INDEX: 38.5 KG/M2 | HEART RATE: 61 BPM | HEIGHT: 64 IN | WEIGHT: 225.53 LBS | RESPIRATION RATE: 16 BRPM | SYSTOLIC BLOOD PRESSURE: 124 MMHG | OXYGEN SATURATION: 98 % | TEMPERATURE: 97.5 F | DIASTOLIC BLOOD PRESSURE: 95 MMHG

## 2021-04-28 PROBLEM — R07.89 ATYPICAL CHEST PAIN: Status: RESOLVED | Noted: 2019-11-05 | Resolved: 2021-04-28

## 2021-04-28 PROBLEM — I26.99 ACUTE PULMONARY EMBOLISM (HCC): Status: ACTIVE | Noted: 2021-04-28

## 2021-04-28 PROBLEM — R30.0 DYSURIA: Status: RESOLVED | Noted: 2021-04-27 | Resolved: 2021-04-28

## 2021-04-28 PROBLEM — I95.9 HYPOTENSION: Status: RESOLVED | Noted: 2019-11-11 | Resolved: 2021-04-28

## 2021-04-28 PROBLEM — R11.0 NAUSEA: Status: RESOLVED | Noted: 2021-04-27 | Resolved: 2021-04-28

## 2021-04-28 LAB
ALBUMIN SERPL BCP-MCNC: 3 G/DL (ref 3.2–4.9)
ALBUMIN/GLOB SERPL: 1.3 G/DL
ALP SERPL-CCNC: 141 U/L (ref 30–99)
ALT SERPL-CCNC: 51 U/L (ref 2–50)
ANION GAP SERPL CALC-SCNC: 10 MMOL/L (ref 7–16)
AST SERPL-CCNC: 61 U/L (ref 12–45)
BASOPHILS # BLD AUTO: 0.4 % (ref 0–1.8)
BASOPHILS # BLD: 0.01 K/UL (ref 0–0.12)
BILIRUB SERPL-MCNC: 0.4 MG/DL (ref 0.1–1.5)
BUN SERPL-MCNC: 16 MG/DL (ref 8–22)
CALCIUM SERPL-MCNC: 7.9 MG/DL (ref 8.4–10.2)
CHLORIDE SERPL-SCNC: 98 MMOL/L (ref 96–112)
CK SERPL-CCNC: 59 U/L (ref 0–154)
CO2 SERPL-SCNC: 25 MMOL/L (ref 20–33)
CORTIS SERPL-MCNC: 2.4 UG/DL (ref 0–23)
CORTIS SERPL-MCNC: 4.4 UG/DL (ref 0–23)
CREAT SERPL-MCNC: 0.71 MG/DL (ref 0.5–1.4)
EOSINOPHIL # BLD AUTO: 0.01 K/UL (ref 0–0.51)
EOSINOPHIL NFR BLD: 0.4 % (ref 0–6.9)
ERYTHROCYTE [DISTWIDTH] IN BLOOD BY AUTOMATED COUNT: 57.1 FL (ref 35.9–50)
GLOBULIN SER CALC-MCNC: 2.3 G/DL (ref 1.9–3.5)
GLUCOSE SERPL-MCNC: 140 MG/DL (ref 65–99)
HCT VFR BLD AUTO: 40.4 % (ref 37–47)
HGB BLD-MCNC: 12.7 G/DL (ref 12–16)
IMM GRANULOCYTES # BLD AUTO: 0.01 K/UL (ref 0–0.11)
IMM GRANULOCYTES NFR BLD AUTO: 0.4 % (ref 0–0.9)
LIPASE SERPL-CCNC: 71 U/L (ref 7–58)
LV EJECT FRACT  99904: 55
LV EJECT FRACT MOD 2C 99903: 55.17
LV EJECT FRACT MOD 4C 99902: 51.37
LV EJECT FRACT MOD BP 99901: 50.52
LYMPHOCYTES # BLD AUTO: 0.42 K/UL (ref 1–4.8)
LYMPHOCYTES NFR BLD: 15.2 % (ref 22–41)
MAGNESIUM SERPL-MCNC: 1.8 MG/DL (ref 1.5–2.5)
MCH RBC QN AUTO: 30.3 PG (ref 27–33)
MCHC RBC AUTO-ENTMCNC: 31.4 G/DL (ref 33.6–35)
MCV RBC AUTO: 96.4 FL (ref 81.4–97.8)
MONOCYTES # BLD AUTO: 0.15 K/UL (ref 0–0.85)
MONOCYTES NFR BLD AUTO: 5.4 % (ref 0–13.4)
NEUTROPHILS # BLD AUTO: 2.17 K/UL (ref 2–7.15)
NEUTROPHILS NFR BLD: 78.2 % (ref 44–72)
NRBC # BLD AUTO: 0 K/UL
NRBC BLD-RTO: 0 /100 WBC
PLATELET # BLD AUTO: 211 K/UL (ref 164–446)
PMV BLD AUTO: 10.2 FL (ref 9–12.9)
POTASSIUM SERPL-SCNC: 3.9 MMOL/L (ref 3.6–5.5)
PROT SERPL-MCNC: 5.3 G/DL (ref 6–8.2)
RBC # BLD AUTO: 4.19 M/UL (ref 4.2–5.4)
SODIUM SERPL-SCNC: 133 MMOL/L (ref 135–145)
T4 FREE SERPL-MCNC: 1.4 NG/DL (ref 0.93–1.7)
TSH SERPL DL<=0.005 MIU/L-ACNC: 0.27 UIU/ML (ref 0.38–5.33)
UFH PPP CHRO-ACNC: 0.14 IU/ML
WBC # BLD AUTO: 2.8 K/UL (ref 4.8–10.8)

## 2021-04-28 PROCEDURE — A9270 NON-COVERED ITEM OR SERVICE: HCPCS | Performed by: HOSPITALIST

## 2021-04-28 PROCEDURE — 93306 TTE W/DOPPLER COMPLETE: CPT

## 2021-04-28 PROCEDURE — A9270 NON-COVERED ITEM OR SERVICE: HCPCS | Performed by: INTERNAL MEDICINE

## 2021-04-28 PROCEDURE — 700102 HCHG RX REV CODE 250 W/ 637 OVERRIDE(OP): Performed by: FAMILY MEDICINE

## 2021-04-28 PROCEDURE — 700102 HCHG RX REV CODE 250 W/ 637 OVERRIDE(OP): Performed by: INTERNAL MEDICINE

## 2021-04-28 PROCEDURE — 700111 HCHG RX REV CODE 636 W/ 250 OVERRIDE (IP): Performed by: FAMILY MEDICINE

## 2021-04-28 PROCEDURE — A9270 NON-COVERED ITEM OR SERVICE: HCPCS | Performed by: FAMILY MEDICINE

## 2021-04-28 PROCEDURE — 96366 THER/PROPH/DIAG IV INF ADDON: CPT

## 2021-04-28 PROCEDURE — 700102 HCHG RX REV CODE 250 W/ 637 OVERRIDE(OP): Performed by: HOSPITALIST

## 2021-04-28 PROCEDURE — 83690 ASSAY OF LIPASE: CPT

## 2021-04-28 PROCEDURE — 85520 HEPARIN ASSAY: CPT

## 2021-04-28 PROCEDURE — 700105 HCHG RX REV CODE 258: Performed by: FAMILY MEDICINE

## 2021-04-28 PROCEDURE — 80053 COMPREHEN METABOLIC PANEL: CPT

## 2021-04-28 PROCEDURE — 83735 ASSAY OF MAGNESIUM: CPT

## 2021-04-28 PROCEDURE — 82533 TOTAL CORTISOL: CPT

## 2021-04-28 PROCEDURE — 96372 THER/PROPH/DIAG INJ SC/IM: CPT

## 2021-04-28 PROCEDURE — 85025 COMPLETE CBC W/AUTO DIFF WBC: CPT

## 2021-04-28 PROCEDURE — 84439 ASSAY OF FREE THYROXINE: CPT

## 2021-04-28 PROCEDURE — 82550 ASSAY OF CK (CPK): CPT

## 2021-04-28 PROCEDURE — 93306 TTE W/DOPPLER COMPLETE: CPT | Mod: 26 | Performed by: INTERNAL MEDICINE

## 2021-04-28 PROCEDURE — G0378 HOSPITAL OBSERVATION PER HR: HCPCS

## 2021-04-28 PROCEDURE — 700111 HCHG RX REV CODE 636 W/ 250 OVERRIDE (IP): Performed by: INTERNAL MEDICINE

## 2021-04-28 PROCEDURE — 99217 PR OBSERVATION CARE DISCHARGE: CPT | Performed by: HOSPITALIST

## 2021-04-28 PROCEDURE — 84443 ASSAY THYROID STIM HORMONE: CPT

## 2021-04-28 RX ORDER — HEPARIN SODIUM 5000 [USP'U]/100ML
18 INJECTION, SOLUTION INTRAVENOUS CONTINUOUS
Status: DISCONTINUED | OUTPATIENT
Start: 2021-04-28 | End: 2021-04-28

## 2021-04-28 RX ORDER — HEPARIN SODIUM 1000 [USP'U]/ML
80 INJECTION, SOLUTION INTRAVENOUS; SUBCUTANEOUS ONCE
Status: DISCONTINUED | OUTPATIENT
Start: 2021-04-28 | End: 2021-04-28

## 2021-04-28 RX ORDER — HEPARIN SODIUM 1000 [USP'U]/ML
40 INJECTION, SOLUTION INTRAVENOUS; SUBCUTANEOUS PRN
Status: DISCONTINUED | OUTPATIENT
Start: 2021-04-28 | End: 2021-04-28

## 2021-04-28 RX ORDER — POTASSIUM CHLORIDE 7.45 MG/ML
10 INJECTION INTRAVENOUS
Status: COMPLETED | OUTPATIENT
Start: 2021-04-28 | End: 2021-04-28

## 2021-04-28 RX ADMIN — Medication 2000 UNITS: at 05:51

## 2021-04-28 RX ADMIN — APIXABAN 10 MG: 5 TABLET, FILM COATED ORAL at 11:06

## 2021-04-28 RX ADMIN — ASPIRIN 325 MG ORAL TABLET 325 MG: 325 PILL ORAL at 05:53

## 2021-04-28 RX ADMIN — DOXAZOSIN 2 MG: 2 TABLET ORAL at 05:52

## 2021-04-28 RX ADMIN — AMITRIPTYLINE HYDROCHLORIDE 10 MG: 10 TABLET, FILM COATED ORAL at 05:54

## 2021-04-28 RX ADMIN — POTASSIUM CHLORIDE 10 MEQ: 7.46 INJECTION, SOLUTION INTRAVENOUS at 02:43

## 2021-04-28 RX ADMIN — POTASSIUM CHLORIDE 10 MEQ: 7.46 INJECTION, SOLUTION INTRAVENOUS at 01:39

## 2021-04-28 RX ADMIN — ACETAMINOPHEN 650 MG: 325 TABLET ORAL at 09:59

## 2021-04-28 RX ADMIN — MEROPENEM 1 G: 1 INJECTION, POWDER, FOR SOLUTION INTRAVENOUS at 05:53

## 2021-04-28 RX ADMIN — ENOXAPARIN SODIUM 40 MG: 40 INJECTION SUBCUTANEOUS at 05:51

## 2021-04-28 RX ADMIN — GABAPENTIN 400 MG: 400 CAPSULE ORAL at 01:39

## 2021-04-28 RX ADMIN — OMEPRAZOLE 20 MG: 20 CAPSULE, DELAYED RELEASE ORAL at 05:52

## 2021-04-28 RX ADMIN — CETIRIZINE HYDROCHLORIDE 20 MG: 10 TABLET, FILM COATED ORAL at 08:57

## 2021-04-28 RX ADMIN — COLESEVELAM HYDROCHLORIDE 625 MG: 625 TABLET, FILM COATED ORAL at 05:52

## 2021-04-28 RX ADMIN — ESTRADIOL 0.5 MG: 1 TABLET ORAL at 05:52

## 2021-04-28 RX ADMIN — GABAPENTIN 400 MG: 400 CAPSULE ORAL at 08:57

## 2021-04-28 ASSESSMENT — COGNITIVE AND FUNCTIONAL STATUS - GENERAL
MOBILITY SCORE: 24
DAILY ACTIVITIY SCORE: 24
SUGGESTED CMS G CODE MODIFIER MOBILITY: CH
SUGGESTED CMS G CODE MODIFIER DAILY ACTIVITY: CH

## 2021-04-28 ASSESSMENT — LIFESTYLE VARIABLES
HAVE YOU EVER FELT YOU SHOULD CUT DOWN ON YOUR DRINKING: NO
CONSUMPTION TOTAL: INCOMPLETE
EVER FELT BAD OR GUILTY ABOUT YOUR DRINKING: NO
TOTAL SCORE: 0
HAVE PEOPLE ANNOYED YOU BY CRITICIZING YOUR DRINKING: NO
TOTAL SCORE: 0
ALCOHOL_USE: YES
TOTAL SCORE: 0
EVER HAD A DRINK FIRST THING IN THE MORNING TO STEADY YOUR NERVES TO GET RID OF A HANGOVER: NO

## 2021-04-28 ASSESSMENT — PATIENT HEALTH QUESTIONNAIRE - PHQ9
1. LITTLE INTEREST OR PLEASURE IN DOING THINGS: NOT AT ALL
2. FEELING DOWN, DEPRESSED, IRRITABLE, OR HOPELESS: NOT AT ALL
SUM OF ALL RESPONSES TO PHQ9 QUESTIONS 1 AND 2: 0

## 2021-04-28 NOTE — ASSESSMENT & PLAN NOTE
- Pt with very labile Bps as an outpatient per Cardiology and Vascular notes  - Was placed on low dose Dexa by Endo per notes, though am cortisol levels were normal  - Will recheck an am cortisol in the morning  - Hold home Coreg for possible stress testing in the am, hold home Aldactone and Amiloride for mild dehydration due to nausea.  Can give home Cardura tomorrow if SBP >100  - Add procalcitonin level to blood in lab, check blood cultures.  May be her norm vs hypotension secondary to acute sinusitis vs PTE

## 2021-04-28 NOTE — WOUND TEAM
Wound consult received regarding patient pannus and lower back. Areas cleansed with NS and gauze. Applied no sting and hydrocolloid thin. Patient verbalized understanding of how to complete dressing care at home. Updated bedside RN. No need for wound team followup.

## 2021-04-28 NOTE — PROGRESS NOTES
Patient was not able to bring her home antibiotics, Xerava.  I discussed with pharmacy who suggests starting daptomycin until the patient is seen by antibiotic stewardship team/infectious disease.

## 2021-04-28 NOTE — HOSPITAL COURSE
Is a 55-year-old female with a past medical history significant for chronic migraine, ascending thoracic aortic aneurysm, hypertension, stress-induced cardiomyopathy, asthma, GERD, history of seizure, TBI, fibromyalgia.  She was  diagnosed with chronic sinusitis and was placed on IV meropenem by Dr Storm of  [facility] on 4/20/2021.  Culture came back positive for MRSA and Pseudomonas.    Patient complained of nausea  and noted to have mildly elevated LFTTransaminitis with AST/ALT 71/46, trending down to 61/51.  This was discussed by admitting physician Dr De Souza with [facility] Dr Marcus.      She presented to ER    She stated ERP having intermittent chest pain, shortness of breath for couple of days; noted to have chest wall tenderness CT of the chest showed subsegmental pulmonary embolism, will start the patient on heparin drip      She does take Decadron chronically.  Noted that 6.  Tension, started by endocrine.  Will obtain morning cortisol.    Noted to have history of Takotsubo, but    Diabetes with hemoglobin of 6  Mildly elevated lipase

## 2021-04-28 NOTE — DISCHARGE SUMMARY
Discharge Summary    CHIEF COMPLAINT ON ADMISSION  Chief Complaint   Patient presents with   • Chest Pain     started about two days ago, pressure located over mid chest, intermittant SOB   • Vascular Access Problem     getting abx infusions for the past week, noticed PICC line was having difficulty yesterday       Reason for Admission  Chest Pains,Other,Back Pain      Admission Date  4/27/2021    CODE STATUS  Full Code    HPI & HOSPITAL COURSE  This is  55-year-old female with a past medical history significant for chronic migraine, ascending thoracic aortic aneurysm, hypertension, stress-induced cardiomyopathy, asthma, GERD, history of seizure, TBI, fibromyalgia.  She was  diagnosed with chronic sinusitis and was placed on IV meropenem by Dr Storm of  [facility] on 4/20/2021.  Culture came back positive for MRSA and Pseudomonas.    Patient complained of nausea  and noted to have mildly elevated LFTTransaminitis with AST/ALT 71/46, trending down to 61/51.  This was discussed by admitting physician Dr De Souza with [facility] Dr Marcus.    She stated ERP having intermittent chest pain, shortness of breath for couple of days; noted to have chest wall tenderness CT of the chest showed subsegmental pulmonary embolism, patient was started on eliquis 10mg po bid being followed by 5 mg po bid.    She does take Decadron chronically which has been started by endocrinology.    Noted to have history of Takotsubo, but repeat echocardiogram obtained, noted to have EF of 55%. Will continue coreg  And losartan. Needs to follow up with pcp as an op.    Diabetes with hemoglobin of 6, continue Januvia. Mildly elevated lipase, denied any abdominal pain.     Patient will continue meropenem and Xeraxva as an op as provided by infectious disease.  Patient is alert and oriented, answering question appropriately.  At this time patient medically stable to be discharged home      Therefore, she is discharged in good and stable condition to home with close  outpatient follow-up.        Discharge Date  4/28/2021    FOLLOW UP ITEMS POST DISCHARGE  Madisyn Mccullough M.D.  [facility]    DISCHARGE DIAGNOSES  Principal Problem (Resolved):    Atypical chest pain POA: Yes  Active Problems:    Acute sinusitis POA: Unknown    Elevated LFTs POA: Unknown    Steroid dependence (HCC) POA: Unknown    Aortic root dilatation (HCC) POA: Yes    Acute pulmonary embolism (HCC) POA: Unknown    Gastroesophageal reflux disease without esophagitis POA: Yes    Seizure (HCC) POA: Yes      Overview: Overview:       H/o seizure after traumatic brain injury in 2002 ( last seizure in 2013 )    Hypothyroidism POA: Yes  Resolved Problems:    Hypotension POA: Yes    Takotsubo syndrome POA: Yes    Nausea POA: Unknown    Dysuria POA: Unknown      FOLLOW UP  Future Appointments   Date Time Provider Department Center   4/30/2021  9:10 AM COVID-19 PREADMIT [facility] PROVIDER [facility] None   4/30/2021  1:30 PM COVID-19 PFIZER SECOND DOSE RESOURCE [facility] None   5/24/2021  3:40 PM Michael J Bloch, M.D. VMED None   7/6/2021  9:40 AM JASPER Del Toro [facility] None   9/13/2021  8:15 AM IHV EXAM 12 ECHO [facility] [address]   9/20/2021  8:00 AM Eligio Torres M.D. [facility] None     Madisyn Mccullough M.D.  [address]  [address] [address]  [phone]    In 2 weeks  Post hospitalizationn follow up    Mary Kay Alford M.D.  [address] [facility] [address]  [phone]    In 1 week  Reschedule your previous appointment that was canceled      MEDICATIONS ON DISCHARGE     Medication List      START taking these medications      Instructions   * apixaban 5mg Tabs  Commonly known as: ELIQUIS   Take 2 Tablets by mouth 2 times a day for 7 days. 10 mg po bid x 7 days followed by 5 mg po bid  Dose: 10 mg     * apixaban 5mg Tabs  Start taking on: May 5, 2021  Commonly known as: ELIQUIS   Take 1 tablet by mouth 2 times a day for 30 days. After completion of loading dose of 10 mg po bid x 7 days, take 5 mg  po bid  Dose: 5 mg         * This list has 2 medication(s) that are the same as other medications prescribed for you. Read the directions carefully, and ask your doctor or other care provider to review them with you.            CHANGE how you take these medications      Instructions   amitriptyline 10 MG Tabs  What changed: See the new instructions.  Commonly known as: ELAVIL   TAKE 2 TABLETS BY MOUTH EVERY NIGHT AT BEDTIME, AND 1 TABLET BY MOUTH EVERY MORNING     losartan 100 MG Tabs  What changed: when to take this  Commonly known as: COZAAR   Take 1 Tab by mouth every day.  Dose: 100 mg        CONTINUE taking these medications      Instructions   calcitonin (salmon) 200 UNIT/ACT Soln  Commonly known as: MIACALCIN   Spray 1 Spray in nose every bedtime.  Dose: 1 Spray     carvedilol 25 MG Tabs  Commonly known as: COREG   Take 25 mg by mouth 2 times a day, with meals.  Dose: 25 mg     cetirizine 10 MG Tabs  Commonly known as: ZYRTEC   Take 20 mg by mouth 2 (two) times a day.  Dose: 20 mg     colesevelam 625 MG Tabs  Commonly known as: WELCHOL   Take 625 mg by mouth 3 times a day, with meals.  Dose: 625 mg     cyanocobalamin 1000 MCG/ML Soln  Commonly known as: VITAMIN B-12   Inject 1,000 mcg into the shoulder, thigh, or buttocks every Saturday.  Dose: 1,000 mcg     dexamethasone 0.5 MG Tabs  Commonly known as: DECADRON   Take 0.5 mg by mouth every bedtime.  Dose: 0.5 mg     Dexilant 60 MG Cpdr delayed-release capsule  Generic drug: Dexlansoprazole   Take 60 mg by mouth every morning.  Dose: 60 mg     doxazosin 2 MG Tabs  Commonly known as: CARDURA   Take 1 Tab by mouth every day.  Dose: 2 mg     DULoxetine 60 MG Cpep delayed-release capsule  Commonly known as: CYMBALTA   Take 60 mg by mouth every bedtime.  Dose: 60 mg     Erenumab 70 MG/ML Soaj  Commonly known as: AIMOVIG   Inject 140 mg under the skin Q30 DAYS.  Dose: 140 mg     estradiol 0.5 MG tablet  Commonly known as: ESTRACE   Take 0.5 mg by mouth every  morning.  Dose: 0.5 mg     Frova 2.5 MG Tabs  Generic drug: Frovatriptan Succinate   Take 2.5 mg by mouth as needed (For migraines).  Dose: 2.5 mg     gabapentin 400 MG Caps  Commonly known as: NEURONTIN   Take 1 Cap by mouth 3 times a day.  Dose: 400 mg     hydrOXYzine pamoate 25 MG Caps  Commonly known as: VISTARIL   Take 25 mg by mouth every 6 hours as needed (for hives).  Dose: 25 mg     Jardiance 10 MG Tabs  Generic drug: Empagliflozin   Take 10 mg by mouth every bedtime.  Dose: 10 mg     levothyroxine 75 MCG Tabs  Commonly known as: SYNTHROID   Take 75 mcg by mouth every evening.  Dose: 75 mcg     liothyronine 25 MCG Tabs  Commonly known as: CYTOMEL   Take 25 mcg by mouth every bedtime.  Dose: 25 mcg     Magnesium 400 MG Tabs   Take 400 mg by mouth every evening.  Dose: 400 mg     meropenem 1 GM Solr  Commonly known as: MERREM   Infuse 1 g into a venous catheter every 8 hours. Pt started a 5 week course of MERREM on 4/20  Dose: 1 g     montelukast 10 MG Tabs  Commonly known as: SINGULAIR   Take 10 mg by mouth every evening.  Dose: 10 mg     multivitamin Tabs   Take 1 Tab by mouth every day.  Dose: 1 tablet     Vitamin D3 2000 UNIT Caps   Take 2,000 Units by mouth every day.  Dose: 2,000 Units     vitamin k 100 MCG tablet   Take 100 mcg by mouth every day.  Dose: 100 mcg     Xerava 100 MG Solr  Generic drug: Eravacycline Dihydrochloride   Infuse 100 mg into a venous catheter 2 times a day. Pt started a 5 week course of XERAVA on 4/20  Dose: 100 mg     Zomacton 10 MG Solr  Generic drug: Somatropin   Inject 0.3 mg under the skin every bedtime.  Dose: 0.3 mg        STOP taking these medications    AMILoride 5 MG Tabs  Commonly known as: MIDAMOR     famotidine 40 MG Tabs  Commonly known as: PEPCID     furosemide 20 MG Tabs  Commonly known as: LASIX     Mobic 15 MG tablet  Generic drug: meloxicam     pantoprazole 40 MG Tbec  Commonly known as: PROTONIX     spironolactone 25 MG Tabs  Commonly known as: ALDACTONE      tizanidine 4 MG Tabs  Commonly known as: ZANAFLEX              DIET  Orders Placed This Encounter   Procedures   • Diet Order Diet: Cardiac; Miscellaneous modifications: (optional): No Decaf, No Caffeine(for test)     Standing Status:   Standing     Number of Occurrences:   1     Order Specific Question:   Diet:     Answer:   Cardiac [6]     Order Specific Question:   Miscellaneous modifications: (optional)     Answer:   No Decaf, No Caffeine(for test) [11]       ACTIVITY  As tolerated.  Weight bearing as tolerated    CONSULTATIONS  None    PROCEDURES  None    LABORATORY  Lab Results   Component Value Date    SODIUM 133 (L) 04/28/2021    POTASSIUM 3.9 04/28/2021    CHLORIDE 98 04/28/2021    CO2 25 04/28/2021    GLUCOSE 140 (H) 04/28/2021    BUN 16 04/28/2021    CREATININE 0.71 04/28/2021        Lab Results   Component Value Date    WBC 2.8 (L) 04/28/2021    HEMOGLOBIN 12.7 04/28/2021    HEMATOCRIT 40.4 04/28/2021    PLATELETCT 211 04/28/2021        Total time of the discharge process exceeds 35 minutes.

## 2021-04-28 NOTE — ASSESSMENT & PLAN NOTE
- Follows with Dr Carlson   - Will ask Radiology to evaluate size of aneurysm on CTA today if possible  - Echo will be able to evaluate as well

## 2021-04-28 NOTE — PROGRESS NOTES
Orders for pt discharge received. Pt d/c to home with friend. Discharge instructions given to pt, tele removed. Pt verbalized understanding of d/c instructions, all questions answered. Pt off unit via wheelchair with CNA.    Pt to follow up with PCP and ID within the next 1-2 weeks.

## 2021-04-28 NOTE — ASSESSMENT & PLAN NOTE
- Pt was started on Merrem and Xerava on 4/20 by Lynnette GERMAN; I have discussed with Dr Marcus.  AST is mildly elevated, pt has had nausea since starting IV antibiotics.  He states this is a known side effect of Xerava, and to continue monitoring LFTs and prolong the Xerava infusion from 1hr to 1.5hrs.   - MRSA and Pseudomonas by culture per ID   - Pt presented to ER as she related that her infusions were taking a long time - discussed with nursing to let me know if they had any issue with the PICC not functioning correctly.  - Lynnette GERMAN to round on pt tomorrow if she is not discharged.  - Continue Merrem and Xerava, pt will have someone bring her the Xerava she has at home as we do not carry.

## 2021-04-28 NOTE — ASSESSMENT & PLAN NOTE
- Mild in nature, has chest wall TTP, no associated diaphoresis - atypical in nature  - Had periodic desats during my examination; d dimer ordered and elevated. Pt states she has an iodine allergy but does fine with pretreatment with Solumedrol and Benadryl.  Will order CTA for PTE with request for evaluation of size of known thoracic aneurysm as well.   - Trend troponins  - Will give pt a non caffeinated diet and hold beta blockade tonight; if CTA is negative for PTE, may need stress testing in the am  - Trend troponins and check echo

## 2021-04-28 NOTE — PROGRESS NOTES
Telemetry Shift Summary    Rhythm: SR/SB w/ 1AVB  HR: 50-90  Ectopy: N/A    Measurements for strip printed 4/28/2021 at 1737  HR 58  0.22 / 0.06 / 0.44    Normal Values  Rhythm: SR  HR:   Measurements: 0.12-0.20 / 0.06-0.10 / 0.30-0.52

## 2021-04-28 NOTE — ASSESSMENT & PLAN NOTE
- Mildly elevated AST, pt states she has history of CCY  - LFT elevation a known side effect of Xerava, ID has recommended continuing and following LFT levels for now

## 2021-04-28 NOTE — ASSESSMENT & PLAN NOTE
- History of stress induced vs tachycardia induced cardiomyopathy   - Recovered EF on echo 3/20  - Recheck echo  - No signs of overload  - Hold home Lasix, Aldactone, Amiloride as she appears dehydrated from her poor oral intake secondary to nausea   - Hold beta blocker for possible stress testing tomorrow am

## 2021-04-28 NOTE — ASSESSMENT & PLAN NOTE
- Likely secondary to new initiation of Xerava, though does also have a history of esophageal stricture that may contribute as well  - Check lipase as she also has mild abdominal pain that comes and goes   - PRN Zofran

## 2021-04-28 NOTE — DISCHARGE INSTRUCTIONS
Discharge Instructions    Discharged to home by car with relative. Discharged via wheelchair, hospital escort: Yes.  Special equipment needed: Not Applicable    Be sure to schedule a follow-up appointment with your primary care doctor or any specialists as instructed.     Discharge Plan:   Diet Plan: Discussed  Activity Level: Discussed  Confirmed Follow up Appointment: Patient to Call and Schedule Appointment  Confirmed Symptoms Management: Discussed  Medication Reconciliation Updated: Yes    I understand that a diet low in cholesterol, fat, and sodium is recommended for good health. Unless I have been given specific instructions below for another diet, I accept this instruction as my diet prescription.   Other diet: As tolerated    Special Instructions: None    · Is patient discharged on Warfarin / Coumadin?   No     Depression / Suicide Risk    As you are discharged from this [facility] facility, it is important to learn how to keep safe from harming yourself.    Recognize the warning signs:  · Abrupt changes in personality, positive or negative- including increase in energy   · Giving away possessions  · Change in eating patterns- significant weight changes-  positive or negative  · Change in sleeping patterns- unable to sleep or sleeping all the time   · Unwillingness or inability to communicate  · Depression  · Unusual sadness, discouragement and loneliness  · Talk of wanting to die  · Neglect of personal appearance   · Rebelliousness- reckless behavior  · Withdrawal from people/activities they love  · Confusion- inability to concentrate     If you or a loved one observes any of these behaviors or has concerns about self-harm, here's what you can do:  · Talk about it- your feelings and reasons for harming yourself  · Remove any means that you might use to hurt yourself (examples: pills, rope, extension cords, firearm)  · Get professional help from the community (Mental Health, Substance Abuse, psychological  counseling)  · Do not be alone:Call your Safe Contact- someone whom you trust who will be there for you.  · Call your local CRISIS HOTLINE 050-0274 or 310-471-6244  · Call your local Children's Mobile Crisis Response Team [place] (018) 316-4078 or www.[employer]keeper  · Call the toll free National Suicide Prevention Hotlines   · National Suicide Prevention Lifeline 426-709-ZDBX (5869)  · National Hope Line Network 800-SUICIDE (365-5779)    Apixaban oral tablets  What is this medicine?  APIXABAN (a PIX a ban) is an anticoagulant (blood thinner). It is used to lower the chance of stroke in people with a medical condition called atrial fibrillation. It is also used to treat or prevent blood clots in the lungs or in the veins.  This medicine may be used for other purposes; ask your health care provider or pharmacist if you have questions.  COMMON BRAND NAME(S): Eliquis  What should I tell my health care provider before I take this medicine?  They need to know if you have any of these conditions:  · antiphospholipid antibody syndrome  · bleeding disorders  · bleeding in the brain  · blood in your stools (black or tarry stools) or if you have blood in your vomit  · history of blood clots  · history of stomach bleeding  · kidney disease  · liver disease  · mechanical heart valve  · an unusual or allergic reaction to apixaban, other medicines, foods, dyes, or preservatives  · pregnant or trying to get pregnant  · breast-feeding  How should I use this medicine?  Take this medicine by mouth with a glass of water. Follow the directions on the prescription label. You can take it with or without food. If it upsets your stomach, take it with food. Take your medicine at regular intervals. Do not take it more often than directed. Do not stop taking except on your doctor's advice. Stopping this medicine may increase your risk of a blood clot. Be sure to refill your prescription before you run out of medicine.  Talk to your  pediatrician regarding the use of this medicine in children. Special care may be needed.  Overdosage: If you think you have taken too much of this medicine contact a poison control center or emergency room at once.  NOTE: This medicine is only for you. Do not share this medicine with others.  What if I miss a dose?  If you miss a dose, take it as soon as you can. If it is almost time for your next dose, take only that dose. Do not take double or extra doses.  What may interact with this medicine?  This medicine may interact with the following:  · aspirin and aspirin-like medicines  · certain medicines for fungal infections like ketoconazole and itraconazole  · certain medicines for seizures like carbamazepine and phenytoin  · certain medicines that treat or prevent blood clots like warfarin, enoxaparin, and dalteparin  · clarithromycin  · NSAIDs, medicines for pain and inflammation, like ibuprofen or naproxen  · rifampin  · ritonavir  · Charlie's wort  This list may not describe all possible interactions. Give your health care provider a list of all the medicines, herbs, non-prescription drugs, or dietary supplements you use. Also tell them if you smoke, drink alcohol, or use illegal drugs. Some items may interact with your medicine.  What should I watch for while using this medicine?  Visit your healthcare professional for regular checks on your progress. You may need blood work done while you are taking this medicine. Your condition will be monitored carefully while you are receiving this medicine. It is important not to miss any appointments.  Avoid sports and activities that might cause injury while you are using this medicine. Severe falls or injuries can cause unseen bleeding. Be careful when using sharp tools or knives. Consider using an electric razor. Take special care brushing or flossing your teeth. Report any injuries, bruising, or red spots on the skin to your healthcare professional.  If you are going  to need surgery or other procedure, tell your healthcare professional that you are taking this medicine.  Wear a medical ID bracelet or chain. Carry a card that describes your disease and details of your medicine and dosage times.  What side effects may I notice from receiving this medicine?  Side effects that you should report to your doctor or health care professional as soon as possible:  · allergic reactions like skin rash, itching or hives, swelling of the face, lips, or tongue  · signs and symptoms of bleeding such as bloody or black, tarry stools; red or dark-brown urine; spitting up blood or brown material that looks like coffee grounds; red spots on the skin; unusual bruising or bleeding from the eye, gums, or nose  · signs and symptoms of a blood clot such as chest pain; shortness of breath; pain, swelling, or warmth in the leg  · signs and symptoms of a stroke such as changes in vision; confusion; trouble speaking or understanding; severe headaches; sudden numbness or weakness of the face, arm or leg; trouble walking; dizziness; loss of coordination  This list may not describe all possible side effects. Call your doctor for medical advice about side effects. You may report side effects to FDA at 9-317-FDA-8570.  Where should I keep my medicine?  Keep out of the reach of children.  Store at room temperature between 20 and 25 degrees C (68 and 77 degrees F). Throw away any unused medicine after the expiration date.  NOTE: This sheet is a summary. It may not cover all possible information. If you have questions about this medicine, talk to your doctor, pharmacist, or health care provider.  © 2020 [employer]vier/[employer] (2019-08-28 17:39:34)      Pulmonary Embolism    A pulmonary embolism (PE) is a sudden blockage or decrease of blood flow in one or both lungs. Most blockages come from a blood clot that forms in the vein of a lower leg, thigh, or arm (deep vein thrombosis, DVT) and travels to the lungs. A  clot is blood that has thickened into a gel or solid. PE is a dangerous and life-threatening condition that needs to be treated right away.  What are the causes?  This condition is usually caused by a blood clot that forms in a vein and moves to the lungs. In rare cases, it may be caused by air, fat, part of a tumor, or other tissue that moves through the veins and into the lungs.  What increases the risk?  The following factors may make you more likely to develop this condition:  · Experiencing a traumatic injury, such as breaking a hip or leg.  · Having:  ? A spinal cord injury.  ? Orthopedic surgery, especially hip or knee replacement.  ? Any major surgery.  ? A stroke.  ? DVT.  ? Blood clots or blood clotting disease.  ? Long-term (chronic) lung or heart disease.  ? Cancer treated with chemotherapy.  ? A central venous catheter.  · Taking medicines that contain estrogen. These include birth control pills and hormone replacement therapy.  · Being:  ? Pregnant.  ? In the period of time after your baby is delivered (postpartum).  ? Older than age 60.  ? Overweight.  ? A smoker, especially if you have other risks.  What are the signs or symptoms?  Symptoms of this condition usually start suddenly and include:  · Shortness of breath during activity or at rest.  · Coughing, coughing up blood, or coughing up blood-tinged mucus.  · Chest pain that is often worse with deep breaths.  · Rapid or irregular heartbeat.  · Feeling light-headed or dizzy.  · Fainting.  · Feeling anxious.  · Fever.  · Sweating.  · Pain and swelling in a leg. This is a symptom of DVT, which can lead to PE.  How is this diagnosed?  This condition may be diagnosed based on:  · Your medical history.  · A physical exam.  · Blood tests.  · CT pulmonary angiogram. This test checks blood flow in and around your lungs.  · Ventilation-perfusion scan, also called a lung VQ scan. This test measures air flow and blood flow to the lungs.  · An ultrasound of  the legs.  How is this treated?  Treatment for this condition depends on many factors, such as the cause of your PE, your risk for bleeding or developing more clots, and other medical conditions you have. Treatment aims to remove, dissolve, or stop blood clots from forming or growing larger. Treatment may include:  · Medicines, such as:  ? Blood thinning medicines (anticoagulants) to stop clots from forming.  ? Medicines that dissolve clots (thrombolytics).  · Procedures, such as:  ? Using a flexible tube to remove a blood clot (embolectomy) or to deliver medicine to destroy it (catheter-directed thrombolysis).  ? Inserting a filter into a large vein that carries blood to the heart (inferior vena cava). This filter (vena cava filter) catches blood clots before they reach the lungs.  ? Surgery to remove the clot (surgical embolectomy). This is rare.  You may need a combination of immediate, long-term (up to 3 months after diagnosis), and extended (more than 3 months after diagnosis) treatments. Your treatment may continue for several months (maintenance therapy). You and your health care provider will work together to choose the treatment program that is best for you.  Follow these instructions at home:  Medicines  · Take over-the-counter and prescription medicines only as told by your health care provider.  · If you are taking an anticoagulant medicine:  ? Take the medicine every day at the same time each day.  ? Understand what foods and drugs interact with your medicine.  ? Understand the side effects of this medicine, including excessive bruising or bleeding. Ask your health care provider or pharmacist about other side effects.  General instructions  · Wear a medical alert bracelet or carry a medical alert card that says you have had a PE and lists what medicines you take.  · Ask your health care provider when you may return to your normal activities. Avoid sitting or lying for a long time without  moving.  · Maintain a healthy weight. Ask your health care provider what weight is healthy for you.  · Do not use any products that contain nicotine or tobacco, such as cigarettes, e-cigarettes, and chewing tobacco. If you need help quitting, ask your health care provider.  · Talk with your health care provider about any travel plans. It is important to make sure that you are still able to take your medicine while on trips.  · Keep all follow-up visits as told by your health care provider. This is important.  Contact a health care provider if:  · You missed a dose of your blood thinner medicine.  Get help right away if:  · You have:  ? New or increased pain, swelling, warmth, or redness in an arm or leg.  ? Numbness or tingling in an arm or leg.  ? Shortness of breath during activity or at rest.  ? A fever.  ? Chest pain.  ? A rapid or irregular heartbeat.  ? A severe headache.  ? Vision changes.  ? A serious fall or accident, or you hit your head.  ? Stomach (abdominal) pain.  ? Blood in your vomit, stool, or urine.  ? A cut that will not stop bleeding.  · You cough up blood.  · You feel light-headed or dizzy.  · You cannot move your arms or legs.  · You are confused or have memory loss.  These symptoms may represent a serious problem that is an emergency. Do not wait to see if the symptoms will go away. Get medical help right away. Call your local emergency services (911 in the [place] Do not drive yourself to the hospital.  Summary  · A pulmonary embolism (PE) is a sudden blockage or decrease of blood flow in one or both lungs. PE is a dangerous and life-threatening condition that needs to be treated right away.  · Treatments for this condition usually include medicines to thin your blood (anticoagulants) or medicines to break apart blood clots (thrombolytics).  · If you are given blood thinners, it is important to take the medicine every day at the same time each day.  · Understand what foods and drugs interact  with any medicines that you are taking.  · If you have signs of PE or DVT, call your local emergency services (911 in the [place]  This information is not intended to replace advice given to you by your health care provider. Make sure you discuss any questions you have with your health care provider.  Document Released: 12/15/2001 Document Revised: 09/25/2019 Document Reviewed: 09/25/2019  Elsevier Patient Education © 2020 [employer]
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
## 2021-04-28 NOTE — DISCHARGE PLANNING
Anticipated Discharge Disposition:   Home with Eliquis     Action:   Chart review complete.     Per MD, the patient may discharge home today with Eliquis. MD will be sending Eliquis to pharmacy. RN CM will call pharmacy for co-pay cost when the prescription has been sent over.     0852: RN CM attempted to call [facility]. The pharmacy is closed until 0900. RN CM will re-attempt to call around 0915.     0925: RN BRITTANY called [facility]. The co-pay cost for a 23 day supply is $15.33. Pharmacist stated that prior auth was not required. MD made aware of cost.     Barriers to Discharge:   None     Plan:   Hospital care management will continue to assist with discharge planning needs.     Care Transition Team Assessment    Information Source  Information Given By: Other (Comments)(chart review )  Who is responsible for making decisions for patient? : Patient    Elopement Risk  Legal Hold: No  Ambulatory or Self Mobile in Wheelchair: No-Not an Elopement Risk    Interdisciplinary Discharge Planning  Does Admitting Nurse Feel This Could be a Complex Discharge?: No  Primary Care Physician: TOÑO MORATAYA M.D  Lives with - Patient's Self Care Capacity: Significant Other  Patient or legal guardian wants to designate a caregiver: No  Support Systems: Family Member(s), Spouse / Significant Other  Housing / Facility: Unable To Determine At This Time  Do You Take your Prescribed Medications Regularly: Yes  Able to Return to Previous ADL's: Yes  Mobility Issues: No  Prior Services: oxygen and HH   Patient Prefers to be Discharged to:: home  Assistance Needed: No  Durable Medical Equipment: Home Oxygen  DME Provider / Phone: Preferred    Discharge Preparedness  What is your plan after discharge?: Home with help  What are your discharge supports?: Spouse  Prior Functional Level: Ambulatory    Functional Assesment  Prior Functional Level: Ambulatory    Finances  Financial Barriers to Discharge: No  Prescription Coverage: Yes    Vision  / Hearing Impairment  Vision Impairment : No  Hearing Impairment : No    Advance Directive  Advance Directive?: None    Domestic Abuse  Have you ever been the victim of abuse or violence?: No  Physical Abuse or Sexual Abuse: No  Verbal Abuse or Emotional Abuse: No  Possible Abuse/Neglect Reported to:: Not Applicable    Psychological Assessment  History of Substance Abuse: None  History of Psychiatric Problems: No    Discharge Risks or Barriers  Discharge risks or barriers?: Complex medical needs  Patient risk factors: Complex medical needs    Anticipated Discharge Information  Discharge Disposition: Discharged to home/self care (01)

## 2021-04-28 NOTE — ASSESSMENT & PLAN NOTE
- On decadron chronically, pt unsure what for  - Notes from Vascular indicate it is for her periodic hypotension started by Endocrine, but am cortisol levels have been normal  - Recheck am cortisol given her hypotension  - Continue home low dose Decadron

## 2021-04-28 NOTE — CARE PLAN
Problem: Infection  Goal: Will remain free from infection  Outcome: PROGRESSING AS EXPECTED  Note: Isolation precautions in place. IV abx started.     Problem: Venous Thromboembolism (VTW)/Deep Vein Thrombosis (DVT) Prevention:  Goal: Patient will participate in Venous Thrombosis (VTE)/Deep Vein Thrombosis (DVT)Prevention Measures  Outcome: PROGRESSING AS EXPECTED  Note: New orders to begin heparin gtt     Problem: Pain Management  Goal: Pain level will decrease to patient's comfort goal  Outcome: PROGRESSING AS EXPECTED  Note: Discussed that pt should alert RN of pain so that proper pain interventions are in place. Discussed pain goal, pt can tolerate 3/10 pain.

## 2021-05-03 DIAGNOSIS — D80.1 HYPOGAMMAGLOBULINEMIA (HCC): ICD-10-CM

## 2021-05-03 RX ORDER — ACETAMINOPHEN 500 MG
1000 TABLET ORAL ONCE
Status: CANCELLED | OUTPATIENT
Start: 2021-05-04 | End: 2021-05-04

## 2021-05-03 RX ORDER — DIPHENHYDRAMINE HCL 25 MG
25 TABLET ORAL ONCE
Status: CANCELLED | OUTPATIENT
Start: 2021-05-04 | End: 2021-05-04

## 2021-05-10 ENCOUNTER — APPOINTMENT (OUTPATIENT)
Dept: CARDIOLOGY | Facility: MEDICAL CENTER | Age: 56
DRG: 202 | End: 2021-05-10
Attending: STUDENT IN AN ORGANIZED HEALTH CARE EDUCATION/TRAINING PROGRAM
Payer: MEDICARE

## 2021-05-10 ENCOUNTER — APPOINTMENT (OUTPATIENT)
Dept: RADIOLOGY | Facility: MEDICAL CENTER | Age: 56
DRG: 202 | End: 2021-05-10
Attending: EMERGENCY MEDICINE
Payer: MEDICARE

## 2021-05-10 ENCOUNTER — HOSPITAL ENCOUNTER (INPATIENT)
Facility: MEDICAL CENTER | Age: 56
LOS: 5 days | DRG: 202 | End: 2021-05-15
Attending: EMERGENCY MEDICINE | Admitting: STUDENT IN AN ORGANIZED HEALTH CARE EDUCATION/TRAINING PROGRAM
Payer: MEDICARE

## 2021-05-10 DIAGNOSIS — I50.22 CHRONIC SYSTOLIC HEART FAILURE (HCC): ICD-10-CM

## 2021-05-10 DIAGNOSIS — E87.79 CARDIAC VOLUME OVERLOAD: ICD-10-CM

## 2021-05-10 DIAGNOSIS — R07.89 OTHER CHEST PAIN: ICD-10-CM

## 2021-05-10 DIAGNOSIS — R06.09 DYSPNEA ON EXERTION: ICD-10-CM

## 2021-05-10 DIAGNOSIS — I26.99 PULMONARY EMBOLISM, UNSPECIFIED CHRONICITY, UNSPECIFIED PULMONARY EMBOLISM TYPE, UNSPECIFIED WHETHER ACUTE COR PULMONALE PRESENT (HCC): ICD-10-CM

## 2021-05-10 DIAGNOSIS — R00.0 SINUS TACHYCARDIA: ICD-10-CM

## 2021-05-10 PROBLEM — Z86.69 HX OF MIGRAINES: Status: ACTIVE | Noted: 2021-05-10

## 2021-05-10 PROBLEM — E87.20 LACTIC ACIDOSIS: Status: ACTIVE | Noted: 2021-05-10

## 2021-05-10 LAB
ALBUMIN SERPL BCP-MCNC: 2.8 G/DL (ref 3.2–4.9)
ALBUMIN/GLOB SERPL: 1 G/DL
ALP SERPL-CCNC: 197 U/L (ref 30–99)
ALT SERPL-CCNC: 74 U/L (ref 2–50)
ANION GAP SERPL CALC-SCNC: 13 MMOL/L (ref 7–16)
APPEARANCE UR: CLEAR
AST SERPL-CCNC: 97 U/L (ref 12–45)
BASOPHILS # BLD AUTO: 0.8 % (ref 0–1.8)
BASOPHILS # BLD: 0.03 K/UL (ref 0–0.12)
BILIRUB SERPL-MCNC: 0.5 MG/DL (ref 0.1–1.5)
BILIRUB UR QL STRIP.AUTO: NEGATIVE
BUN SERPL-MCNC: 16 MG/DL (ref 8–22)
CALCIUM SERPL-MCNC: 8.6 MG/DL (ref 8.4–10.2)
CHLORIDE SERPL-SCNC: 100 MMOL/L (ref 96–112)
CO2 SERPL-SCNC: 23 MMOL/L (ref 20–33)
COLOR UR: YELLOW
CREAT SERPL-MCNC: 1.27 MG/DL (ref 0.5–1.4)
EOSINOPHIL # BLD AUTO: 0.26 K/UL (ref 0–0.51)
EOSINOPHIL NFR BLD: 7.3 % (ref 0–6.9)
ERYTHROCYTE [DISTWIDTH] IN BLOOD BY AUTOMATED COUNT: 57.4 FL (ref 35.9–50)
FLUAV RNA SPEC QL NAA+PROBE: NEGATIVE
FLUBV RNA SPEC QL NAA+PROBE: NEGATIVE
GLOBULIN SER CALC-MCNC: 2.8 G/DL (ref 1.9–3.5)
GLUCOSE BLD-MCNC: 120 MG/DL (ref 65–99)
GLUCOSE BLD-MCNC: 218 MG/DL (ref 65–99)
GLUCOSE SERPL-MCNC: 118 MG/DL (ref 65–99)
GLUCOSE UR STRIP.AUTO-MCNC: NEGATIVE MG/DL
HCT VFR BLD AUTO: 43 % (ref 37–47)
HGB BLD-MCNC: 13.7 G/DL (ref 12–16)
IMM GRANULOCYTES # BLD AUTO: 0.01 K/UL (ref 0–0.11)
IMM GRANULOCYTES NFR BLD AUTO: 0.3 % (ref 0–0.9)
KETONES UR STRIP.AUTO-MCNC: NEGATIVE MG/DL
LACTATE BLD-SCNC: 1.8 MMOL/L (ref 0.5–2)
LACTATE BLD-SCNC: 2.2 MMOL/L (ref 0.5–2)
LACTATE BLD-SCNC: 3.9 MMOL/L (ref 0.5–2)
LEUKOCYTE ESTERASE UR QL STRIP.AUTO: NEGATIVE
LV EJECT FRACT MOD 2C 99903: 70.62
LV EJECT FRACT MOD 4C 99902: 52.64
LV EJECT FRACT MOD BP 99901: 63.13
LYMPHOCYTES # BLD AUTO: 1.32 K/UL (ref 1–4.8)
LYMPHOCYTES NFR BLD: 37.3 % (ref 22–41)
MCH RBC QN AUTO: 30.5 PG (ref 27–33)
MCHC RBC AUTO-ENTMCNC: 31.9 G/DL (ref 33.6–35)
MCV RBC AUTO: 95.8 FL (ref 81.4–97.8)
MICRO URNS: NORMAL
MONOCYTES # BLD AUTO: 0.48 K/UL (ref 0–0.85)
MONOCYTES NFR BLD AUTO: 13.6 % (ref 0–13.4)
NEUTROPHILS # BLD AUTO: 1.44 K/UL (ref 2–7.15)
NEUTROPHILS NFR BLD: 40.7 % (ref 44–72)
NITRITE UR QL STRIP.AUTO: NEGATIVE
NRBC # BLD AUTO: 0 K/UL
NRBC BLD-RTO: 0 /100 WBC
NT-PROBNP SERPL IA-MCNC: 176 PG/ML (ref 0–125)
PH UR STRIP.AUTO: 6.5 [PH] (ref 5–8)
PLATELET # BLD AUTO: 221 K/UL (ref 164–446)
PMV BLD AUTO: 11.6 FL (ref 9–12.9)
POTASSIUM SERPL-SCNC: 4.4 MMOL/L (ref 3.6–5.5)
PROT SERPL-MCNC: 5.6 G/DL (ref 6–8.2)
PROT UR QL STRIP: NEGATIVE MG/DL
RBC # BLD AUTO: 4.49 M/UL (ref 4.2–5.4)
RBC UR QL AUTO: NEGATIVE
RSV RNA SPEC QL NAA+PROBE: NEGATIVE
SARS-COV-2 RNA RESP QL NAA+PROBE: NOTDETECTED
SODIUM SERPL-SCNC: 136 MMOL/L (ref 135–145)
SP GR UR STRIP.AUTO: <=1.005
SPECIMEN SOURCE: NORMAL
TROPONIN T SERPL-MCNC: 12 NG/L (ref 6–19)
WBC # BLD AUTO: 3.5 K/UL (ref 4.8–10.8)

## 2021-05-10 PROCEDURE — 99223 1ST HOSP IP/OBS HIGH 75: CPT | Performed by: STUDENT IN AN ORGANIZED HEALTH CARE EDUCATION/TRAINING PROGRAM

## 2021-05-10 PROCEDURE — 87077 CULTURE AEROBIC IDENTIFY: CPT

## 2021-05-10 PROCEDURE — C9803 HOPD COVID-19 SPEC COLLECT: HCPCS | Performed by: EMERGENCY MEDICINE

## 2021-05-10 PROCEDURE — 700111 HCHG RX REV CODE 636 W/ 250 OVERRIDE (IP): Performed by: STUDENT IN AN ORGANIZED HEALTH CARE EDUCATION/TRAINING PROGRAM

## 2021-05-10 PROCEDURE — 87086 URINE CULTURE/COLONY COUNT: CPT

## 2021-05-10 PROCEDURE — 94760 N-INVAS EAR/PLS OXIMETRY 1: CPT

## 2021-05-10 PROCEDURE — 83605 ASSAY OF LACTIC ACID: CPT | Mod: 91

## 2021-05-10 PROCEDURE — 700101 HCHG RX REV CODE 250: Performed by: STUDENT IN AN ORGANIZED HEALTH CARE EDUCATION/TRAINING PROGRAM

## 2021-05-10 PROCEDURE — 93325 DOPPLER ECHO COLOR FLOW MAPG: CPT | Mod: 26 | Performed by: INTERNAL MEDICINE

## 2021-05-10 PROCEDURE — 700105 HCHG RX REV CODE 258: Performed by: EMERGENCY MEDICINE

## 2021-05-10 PROCEDURE — 93321 DOPPLER ECHO F-UP/LMTD STD: CPT | Mod: 26 | Performed by: INTERNAL MEDICINE

## 2021-05-10 PROCEDURE — 94640 AIRWAY INHALATION TREATMENT: CPT

## 2021-05-10 PROCEDURE — 71275 CT ANGIOGRAPHY CHEST: CPT | Mod: ME

## 2021-05-10 PROCEDURE — 96374 THER/PROPH/DIAG INJ IV PUSH: CPT

## 2021-05-10 PROCEDURE — 87040 BLOOD CULTURE FOR BACTERIA: CPT

## 2021-05-10 PROCEDURE — 84484 ASSAY OF TROPONIN QUANT: CPT

## 2021-05-10 PROCEDURE — 96375 TX/PRO/DX INJ NEW DRUG ADDON: CPT

## 2021-05-10 PROCEDURE — 700111 HCHG RX REV CODE 636 W/ 250 OVERRIDE (IP): Performed by: EMERGENCY MEDICINE

## 2021-05-10 PROCEDURE — 700105 HCHG RX REV CODE 258: Performed by: STUDENT IN AN ORGANIZED HEALTH CARE EDUCATION/TRAINING PROGRAM

## 2021-05-10 PROCEDURE — 93325 DOPPLER ECHO COLOR FLOW MAPG: CPT

## 2021-05-10 PROCEDURE — 0241U HCHG SARS-COV-2 COVID-19 NFCT DS RESP RNA 4 TRGT MIC: CPT

## 2021-05-10 PROCEDURE — 83880 ASSAY OF NATRIURETIC PEPTIDE: CPT

## 2021-05-10 PROCEDURE — 81003 URINALYSIS AUTO W/O SCOPE: CPT

## 2021-05-10 PROCEDURE — 700117 HCHG RX CONTRAST REV CODE 255: Performed by: EMERGENCY MEDICINE

## 2021-05-10 PROCEDURE — 82962 GLUCOSE BLOOD TEST: CPT | Mod: 91

## 2021-05-10 PROCEDURE — 99285 EMERGENCY DEPT VISIT HI MDM: CPT

## 2021-05-10 PROCEDURE — 770006 HCHG ROOM/CARE - MED/SURG/GYN SEMI*

## 2021-05-10 PROCEDURE — 85025 COMPLETE CBC W/AUTO DIFF WBC: CPT

## 2021-05-10 PROCEDURE — 93005 ELECTROCARDIOGRAM TRACING: CPT | Performed by: EMERGENCY MEDICINE

## 2021-05-10 PROCEDURE — 93005 ELECTROCARDIOGRAM TRACING: CPT

## 2021-05-10 PROCEDURE — 700102 HCHG RX REV CODE 250 W/ 637 OVERRIDE(OP): Performed by: STUDENT IN AN ORGANIZED HEALTH CARE EDUCATION/TRAINING PROGRAM

## 2021-05-10 PROCEDURE — A9270 NON-COVERED ITEM OR SERVICE: HCPCS | Performed by: STUDENT IN AN ORGANIZED HEALTH CARE EDUCATION/TRAINING PROGRAM

## 2021-05-10 PROCEDURE — 36415 COLL VENOUS BLD VENIPUNCTURE: CPT

## 2021-05-10 PROCEDURE — 93308 TTE F-UP OR LMTD: CPT | Mod: 26 | Performed by: INTERNAL MEDICINE

## 2021-05-10 PROCEDURE — 80053 COMPREHEN METABOLIC PANEL: CPT

## 2021-05-10 PROCEDURE — 71045 X-RAY EXAM CHEST 1 VIEW: CPT

## 2021-05-10 RX ORDER — ONDANSETRON 2 MG/ML
4 INJECTION INTRAMUSCULAR; INTRAVENOUS EVERY 4 HOURS PRN
Status: DISCONTINUED | OUTPATIENT
Start: 2021-05-10 | End: 2021-05-15 | Stop reason: HOSPADM

## 2021-05-10 RX ORDER — COLESEVELAM 180 1/1
625 TABLET ORAL
Status: DISCONTINUED | OUTPATIENT
Start: 2021-05-10 | End: 2021-05-15 | Stop reason: HOSPADM

## 2021-05-10 RX ORDER — DULOXETIN HYDROCHLORIDE 30 MG/1
60 CAPSULE, DELAYED RELEASE ORAL
Status: DISCONTINUED | OUTPATIENT
Start: 2021-05-10 | End: 2021-05-15 | Stop reason: HOSPADM

## 2021-05-10 RX ORDER — LEVOTHYROXINE SODIUM 0.07 MG/1
75 TABLET ORAL EVERY EVENING
Status: DISCONTINUED | OUTPATIENT
Start: 2021-05-10 | End: 2021-05-15 | Stop reason: HOSPADM

## 2021-05-10 RX ORDER — ONDANSETRON 4 MG/1
4 TABLET, ORALLY DISINTEGRATING ORAL 2 TIMES DAILY PRN
COMMUNITY
End: 2021-05-21

## 2021-05-10 RX ORDER — POLYETHYLENE GLYCOL 3350 17 G/17G
1 POWDER, FOR SOLUTION ORAL
Status: DISCONTINUED | OUTPATIENT
Start: 2021-05-10 | End: 2021-05-15 | Stop reason: HOSPADM

## 2021-05-10 RX ORDER — IPRATROPIUM BROMIDE AND ALBUTEROL SULFATE 2.5; .5 MG/3ML; MG/3ML
3 SOLUTION RESPIRATORY (INHALATION)
Status: DISCONTINUED | OUTPATIENT
Start: 2021-05-10 | End: 2021-05-15 | Stop reason: HOSPADM

## 2021-05-10 RX ORDER — IPRATROPIUM BROMIDE AND ALBUTEROL SULFATE 2.5; .5 MG/3ML; MG/3ML
3 SOLUTION RESPIRATORY (INHALATION)
Status: DISCONTINUED | OUTPATIENT
Start: 2021-05-10 | End: 2021-05-12

## 2021-05-10 RX ORDER — ALBUTEROL SULFATE 90 UG/1
1-2 AEROSOL, METERED RESPIRATORY (INHALATION) EVERY 4 HOURS PRN
Status: ON HOLD | COMMUNITY
End: 2021-05-15

## 2021-05-10 RX ORDER — DEXAMETHASONE 0.5 MG/1
0.5 TABLET ORAL
Status: DISCONTINUED | OUTPATIENT
Start: 2021-05-10 | End: 2021-05-10

## 2021-05-10 RX ORDER — PROMETHAZINE HYDROCHLORIDE 25 MG/1
12.5-25 TABLET ORAL EVERY 4 HOURS PRN
Status: DISCONTINUED | OUTPATIENT
Start: 2021-05-10 | End: 2021-05-15 | Stop reason: HOSPADM

## 2021-05-10 RX ORDER — CROMOLYN SODIUM 100 MG/5ML
200 SOLUTION, CONCENTRATE ORAL
COMMUNITY
End: 2021-06-22

## 2021-05-10 RX ORDER — METHYLPREDNISOLONE SODIUM SUCCINATE 125 MG/2ML
125 INJECTION, POWDER, LYOPHILIZED, FOR SOLUTION INTRAMUSCULAR; INTRAVENOUS ONCE
Status: COMPLETED | OUTPATIENT
Start: 2021-05-10 | End: 2021-05-10

## 2021-05-10 RX ORDER — DIPHENHYDRAMINE HYDROCHLORIDE 50 MG/ML
25 INJECTION INTRAMUSCULAR; INTRAVENOUS ONCE
Status: COMPLETED | OUTPATIENT
Start: 2021-05-10 | End: 2021-05-10

## 2021-05-10 RX ORDER — PROCHLORPERAZINE EDISYLATE 5 MG/ML
5-10 INJECTION INTRAMUSCULAR; INTRAVENOUS EVERY 4 HOURS PRN
Status: DISCONTINUED | OUTPATIENT
Start: 2021-05-10 | End: 2021-05-15 | Stop reason: HOSPADM

## 2021-05-10 RX ORDER — PREDNISONE 20 MG/1
40 TABLET ORAL DAILY
Status: DISCONTINUED | OUTPATIENT
Start: 2021-05-10 | End: 2021-05-13

## 2021-05-10 RX ORDER — ONDANSETRON 4 MG/1
4 TABLET, ORALLY DISINTEGRATING ORAL EVERY 4 HOURS PRN
Status: DISCONTINUED | OUTPATIENT
Start: 2021-05-10 | End: 2021-05-15 | Stop reason: HOSPADM

## 2021-05-10 RX ORDER — LIOTHYRONINE SODIUM 5 UG/1
25 TABLET ORAL
Status: DISCONTINUED | OUTPATIENT
Start: 2021-05-10 | End: 2021-05-15 | Stop reason: HOSPADM

## 2021-05-10 RX ORDER — GABAPENTIN 400 MG/1
400 CAPSULE ORAL 3 TIMES DAILY
Status: DISCONTINUED | OUTPATIENT
Start: 2021-05-10 | End: 2021-05-15 | Stop reason: HOSPADM

## 2021-05-10 RX ORDER — AMITRIPTYLINE HYDROCHLORIDE 10 MG/1
10 TABLET, FILM COATED ORAL 2 TIMES DAILY
Status: DISCONTINUED | OUTPATIENT
Start: 2021-05-10 | End: 2021-05-15 | Stop reason: HOSPADM

## 2021-05-10 RX ORDER — DEXTROSE MONOHYDRATE 25 G/50ML
50 INJECTION, SOLUTION INTRAVENOUS
Status: DISCONTINUED | OUTPATIENT
Start: 2021-05-10 | End: 2021-05-15 | Stop reason: HOSPADM

## 2021-05-10 RX ORDER — AMITRIPTYLINE HYDROCHLORIDE 10 MG/1
10-20 TABLET, FILM COATED ORAL 2 TIMES DAILY
Status: DISCONTINUED | OUTPATIENT
Start: 2021-05-10 | End: 2021-05-10

## 2021-05-10 RX ORDER — BISACODYL 10 MG
10 SUPPOSITORY, RECTAL RECTAL
Status: DISCONTINUED | OUTPATIENT
Start: 2021-05-10 | End: 2021-05-15 | Stop reason: HOSPADM

## 2021-05-10 RX ORDER — AMOXICILLIN 250 MG
2 CAPSULE ORAL 2 TIMES DAILY
Status: DISCONTINUED | OUTPATIENT
Start: 2021-05-10 | End: 2021-05-15 | Stop reason: HOSPADM

## 2021-05-10 RX ORDER — MONTELUKAST SODIUM 10 MG/1
10 TABLET ORAL EVERY EVENING
Status: DISCONTINUED | OUTPATIENT
Start: 2021-05-10 | End: 2021-05-15 | Stop reason: HOSPADM

## 2021-05-10 RX ORDER — PROMETHAZINE HYDROCHLORIDE 25 MG/1
12.5-25 SUPPOSITORY RECTAL EVERY 4 HOURS PRN
Status: DISCONTINUED | OUTPATIENT
Start: 2021-05-10 | End: 2021-05-15 | Stop reason: HOSPADM

## 2021-05-10 RX ORDER — ACETAMINOPHEN 325 MG/1
650 TABLET ORAL EVERY 6 HOURS PRN
Status: DISCONTINUED | OUTPATIENT
Start: 2021-05-10 | End: 2021-05-15 | Stop reason: HOSPADM

## 2021-05-10 RX ORDER — CETIRIZINE HYDROCHLORIDE 10 MG/1
20 TABLET ORAL
Status: DISCONTINUED | OUTPATIENT
Start: 2021-05-10 | End: 2021-05-15 | Stop reason: HOSPADM

## 2021-05-10 RX ORDER — SODIUM CHLORIDE, SODIUM LACTATE, POTASSIUM CHLORIDE, AND CALCIUM CHLORIDE .6; .31; .03; .02 G/100ML; G/100ML; G/100ML; G/100ML
30 INJECTION, SOLUTION INTRAVENOUS
Status: COMPLETED | OUTPATIENT
Start: 2021-05-10 | End: 2021-05-10

## 2021-05-10 RX ADMIN — APIXABAN 5 MG: 5 TABLET, FILM COATED ORAL at 18:14

## 2021-05-10 RX ADMIN — PREDNISONE 40 MG: 20 TABLET ORAL at 16:30

## 2021-05-10 RX ADMIN — GABAPENTIN 400 MG: 400 CAPSULE ORAL at 18:13

## 2021-05-10 RX ADMIN — MONTELUKAST 10 MG: 10 TABLET, FILM COATED ORAL at 18:11

## 2021-05-10 RX ADMIN — IOHEXOL 50 ML: 350 INJECTION, SOLUTION INTRAVENOUS at 13:00

## 2021-05-10 RX ADMIN — MEROPENEM 1 G: 1 INJECTION, POWDER, FOR SOLUTION INTRAVENOUS at 16:28

## 2021-05-10 RX ADMIN — LIOTHYRONINE SODIUM 25 MCG: 5 TABLET ORAL at 21:09

## 2021-05-10 RX ADMIN — METHYLPREDNISOLONE SODIUM SUCCINATE 125 MG: 125 INJECTION, POWDER, FOR SOLUTION INTRAMUSCULAR; INTRAVENOUS at 11:29

## 2021-05-10 RX ADMIN — LEVOTHYROXINE SODIUM 75 MCG: 0.07 TABLET ORAL at 18:12

## 2021-05-10 RX ADMIN — MEROPENEM 1 G: 1 INJECTION, POWDER, FOR SOLUTION INTRAVENOUS at 21:24

## 2021-05-10 RX ADMIN — CETIRIZINE HYDROCHLORIDE 20 MG: 10 TABLET, FILM COATED ORAL at 21:10

## 2021-05-10 RX ADMIN — SODIUM CHLORIDE, POTASSIUM CHLORIDE, SODIUM LACTATE AND CALCIUM CHLORIDE 2181 ML: 600; 310; 30; 20 INJECTION, SOLUTION INTRAVENOUS at 10:57

## 2021-05-10 RX ADMIN — DIPHENHYDRAMINE HYDROCHLORIDE 25 MG: 50 INJECTION INTRAMUSCULAR; INTRAVENOUS at 11:29

## 2021-05-10 RX ADMIN — DULOXETINE HYDROCHLORIDE 60 MG: 30 CAPSULE, DELAYED RELEASE ORAL at 21:10

## 2021-05-10 RX ADMIN — IPRATROPIUM BROMIDE AND ALBUTEROL SULFATE 3 ML: .5; 2.5 SOLUTION RESPIRATORY (INHALATION) at 16:38

## 2021-05-10 RX ADMIN — COLESEVELAM HYDROCHLORIDE 625 MG: 625 TABLET, FILM COATED ORAL at 18:11

## 2021-05-10 RX ADMIN — INSULIN HUMAN 2 UNITS: 100 INJECTION, SOLUTION PARENTERAL at 21:18

## 2021-05-10 RX ADMIN — IPRATROPIUM BROMIDE AND ALBUTEROL SULFATE 3 ML: .5; 2.5 SOLUTION RESPIRATORY (INHALATION) at 19:30

## 2021-05-10 RX ADMIN — IPRATROPIUM BROMIDE AND ALBUTEROL SULFATE 3 ML: .5; 2.5 SOLUTION RESPIRATORY (INHALATION) at 23:10

## 2021-05-10 RX ADMIN — AMITRIPTYLINE HYDROCHLORIDE 10 MG: 10 TABLET, FILM COATED ORAL at 18:11

## 2021-05-10 ASSESSMENT — COGNITIVE AND FUNCTIONAL STATUS - GENERAL
CLIMB 3 TO 5 STEPS WITH RAILING: A LOT
WALKING IN HOSPITAL ROOM: A LITTLE
DAILY ACTIVITIY SCORE: 24
MOBILITY SCORE: 21
SUGGESTED CMS G CODE MODIFIER DAILY ACTIVITY: CH
SUGGESTED CMS G CODE MODIFIER MOBILITY: CJ

## 2021-05-10 ASSESSMENT — ENCOUNTER SYMPTOMS
MUSCULOSKELETAL NEGATIVE: 1
GASTROINTESTINAL NEGATIVE: 1
CONSTITUTIONAL NEGATIVE: 1
SHORTNESS OF BREATH: 1

## 2021-05-10 ASSESSMENT — LIFESTYLE VARIABLES
HAVE PEOPLE ANNOYED YOU BY CRITICIZING YOUR DRINKING: NO
ALCOHOL_USE: YES
HAVE YOU EVER FELT YOU SHOULD CUT DOWN ON YOUR DRINKING: NO
ON A TYPICAL DAY WHEN YOU DRINK ALCOHOL HOW MANY DRINKS DO YOU HAVE: 1
CONSUMPTION TOTAL: NEGATIVE
HOW MANY TIMES IN THE PAST YEAR HAVE YOU HAD 5 OR MORE DRINKS IN A DAY: 0
TOTAL SCORE: 0
TOTAL SCORE: 0
AVERAGE NUMBER OF DAYS PER WEEK YOU HAVE A DRINK CONTAINING ALCOHOL: 1
TOTAL SCORE: 0
EVER FELT BAD OR GUILTY ABOUT YOUR DRINKING: NO
EVER HAD A DRINK FIRST THING IN THE MORNING TO STEADY YOUR NERVES TO GET RID OF A HANGOVER: NO

## 2021-05-10 ASSESSMENT — COPD QUESTIONNAIRES
DURING THE PAST 4 WEEKS HOW MUCH DID YOU FEEL SHORT OF BREATH: SOME OF THE TIME
COPD SCREENING SCORE: 3
HAVE YOU SMOKED AT LEAST 100 CIGARETTES IN YOUR ENTIRE LIFE: NO/DON'T KNOW
DO YOU EVER COUGH UP ANY MUCUS OR PHLEGM?: NO/ONLY WITH OCCASIONAL COLDS OR INFECTIONS

## 2021-05-10 ASSESSMENT — FIBROSIS 4 INDEX: FIB4 SCORE: 2.27

## 2021-05-10 NOTE — ASSESSMENT & PLAN NOTE
Diagnosed about 2 weeks ago.  Continue home Eliquis medication.  Repeat CTA chest here without evidence of PTE.
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
## 2021-05-10 NOTE — ASSESSMENT & PLAN NOTE
- On decadron chronically, pt unsure what for  - Notes from Vascular indicate it is for her periodic hypotension started by Endocrine

## 2021-05-10 NOTE — ED NOTES
Med rec completed per pt   Allergies reviewed    Pt started a 5 week course of Xerava and Merrem on 4/20/2021

## 2021-05-10 NOTE — FLOWSHEET NOTE
05/10/21 1641   Vital Signs   Pulse 84   Respiration 18   Pulse Oximetry 100 %   $ Pulse Oximetry (Spot Check) Yes   Respiratory Assessment   Respiratory Pattern Within Normal Limits   Level of Consciousness Alert   Chest Exam   Work Of Breathing / Effort Shallow   Breath Sounds   RUL Breath Sounds Diminished   RML Breath Sounds Diminished   RLL Breath Sounds Diminished   TRISTAN Breath Sounds Diminished   LLL Breath Sounds Diminished   Oxygen   O2 (LPM) 1.5   O2 Delivery Device Silicone Nasal Cannula
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
## 2021-05-10 NOTE — ED TRIAGE NOTES
"Chief Complaint   Patient presents with   • Shortness of Breath     started yesterday, having to stop frequently to catch breath     BP (!) 91/52   Pulse (!) 109   Temp 35.8 °C (96.5 °F) (Temporal)   Resp 20   Ht 1.626 m (5' 4\")   Wt 99.8 kg (220 lb 0.3 oz)   LMP  (LMP Unknown)   SpO2 94%   BMI 37.77 kg/m²     Pt to ED via WC w/ family member for c/o increasing shortness of breath starting yesterday.  Pt reports Hx of PE.  Pt states has to stop talking mid-sentence and has to stop frequently to catch breath while doing activities.      "

## 2021-05-10 NOTE — ASSESSMENT & PLAN NOTE
- Pt was started on Merrem and Xerava on 4/20 by Lynnette GERMAN  - MRSA and Pseudomonas by culture per ID   - Continue Merrem and Xerava  - appreciate consult from Lynnette GERMAN

## 2021-05-10 NOTE — ED NOTES
BC #2 drawn and sent to lab, fluid bolus started emergently through PICC line - unable to get a BP reading on pt, pt c/o dizziness but able to converse w/ RN, CT called and are able to preform test through pt PICC

## 2021-05-10 NOTE — DISCHARGE PLANNING
ER CM met with pt at bedside with her  Friend  Kylee at bedside. Pleasant. AO x4. She will be staying at [facility] post acute, [address], [place] [address]  [phone]. Her  Colin is in [place] currently traveling. She fills Rx at [facility].  She has access to wheelchair and walker but these were not hers so they may not be approp to ht wt. She has had o2 in past but not currently. She has knee scooter.  PCP is Dr Hernandez  She has a complex medical with fibromyalgia. She was recently admitted for PE and Transaminitis. She does follow as outpt at [facility]  Care Transition Team Assessment    Information Source  Orientation Level: Oriented X4  Information Given By: Patient  Informant's Name: Donna  Who is responsible for making decisions for patient? : Patient    Readmission Evaluation  Is this a readmission?: Yes - unplanned readmission  Why do you think you were readmitted?: SOB. Was PE  Was an appointment arranged for you prior to discharge?: No  Were there new prescriptions you were supposed to fill after you were discharged?: Yes, prescriptions filled  Did you understand your discharge instructions?: Yes  Did you have enough support after your last discharge?: Yes    Elopement Risk  Legal Hold: No  Ambulatory or Self Mobile in Wheelchair: No-Not an Elopement Risk    Interdisciplinary Discharge Planning  Primary Care Physician: Dr Gonzalez  Lives with - Patient's Self Care Capacity: Friends  Support Systems: Friends / Neighbors, Spouse / Significant Other(Kylee while  out of state)  Housing / Facility: [address] [facility]([address] [place] [address])  Able to Return to Previous ADL's: Yes  Mobility Issues: Yes  Prior Services: None  Patient Prefers to be Discharged to:: ([facility] [address] [place] [address])  Assistance Needed: Yes  Durable Medical Equipment: Other - Specify(Has access to Wheelchair and walker but not hers. May not be)    Discharge Preparedness  What is your plan  after discharge?: Uncertain - pending medical team collaboration  Prior Functional Level: Ambulatory, Drives Self, Independent with Activities of Daily Living, Independent with Medication Management    Functional Assesment  Prior Functional Level: Ambulatory, Drives Self, Independent with Activities of Daily Living, Independent with Medication Management    Finances  Prescription Coverage: Yes([facility])    Vision / Hearing Impairment  Vision Impairment : No         Advance Directive  Advance Directive?: None    Domestic Abuse  Have you ever been the victim of abuse or violence?: No    Psychological Assessment  History of Substance Abuse: None    Discharge Risks or Barriers  Patient risk factors: Complex medical needs    Anticipated Discharge Information  Discharge Disposition: Still a Patient (30)

## 2021-05-10 NOTE — ASSESSMENT & PLAN NOTE
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

## 2021-05-10 NOTE — ASSESSMENT & PLAN NOTE
- Presenting with dyspnea on exertion with 10 to 20 feet of ambulation.  - Recently diagnosed with PTE in April, CTA this stay without evidence of PTE, continues on Eliquis - CT did show possible edema or pneumonitis, but pt does not appear volume overloaded   - had wheezing on exam per H&P on admit, Prednisone given   - Echo without evidence of recurrence of her cardiomyopathy, but does have peripheral edema and rales - diuresing  - Continue breathing treatments, no wheezing on exam   - Ambulatory O2 sat performed, pt did not drop below 91%, will repeat today as pt discharging tomorrow  - Sputum culture negative

## 2021-05-11 PROBLEM — J32.9 CHRONIC SINUSITIS: Status: RESOLVED | Noted: 2020-01-21 | Resolved: 2021-05-11

## 2021-05-11 PROBLEM — R00.0 SINUS TACHYCARDIA: Status: ACTIVE | Noted: 2021-05-11

## 2021-05-11 LAB
ALBUMIN SERPL BCP-MCNC: 3.1 G/DL (ref 3.2–4.9)
ALBUMIN/GLOB SERPL: 1.2 G/DL
ALP SERPL-CCNC: 192 U/L (ref 30–99)
ALT SERPL-CCNC: 68 U/L (ref 2–50)
AMPHET UR QL SCN: NEGATIVE
ANION GAP SERPL CALC-SCNC: 16 MMOL/L (ref 7–16)
AST SERPL-CCNC: 67 U/L (ref 12–45)
BARBITURATES UR QL SCN: NEGATIVE
BENZODIAZ UR QL SCN: NEGATIVE
BILIRUB SERPL-MCNC: 0.4 MG/DL (ref 0.1–1.5)
BUN SERPL-MCNC: 12 MG/DL (ref 8–22)
BZE UR QL SCN: NEGATIVE
CALCIUM SERPL-MCNC: 8.6 MG/DL (ref 8.4–10.2)
CANNABINOIDS UR QL SCN: NEGATIVE
CHLORIDE SERPL-SCNC: 103 MMOL/L (ref 96–112)
CO2 SERPL-SCNC: 21 MMOL/L (ref 20–33)
CORTIS SERPL-MCNC: 4.4 UG/DL (ref 0–23)
CREAT SERPL-MCNC: 1.13 MG/DL (ref 0.5–1.4)
EKG IMPRESSION: NORMAL
EKG IMPRESSION: NORMAL
ERYTHROCYTE [DISTWIDTH] IN BLOOD BY AUTOMATED COUNT: 57.5 FL (ref 35.9–50)
GLOBULIN SER CALC-MCNC: 2.6 G/DL (ref 1.9–3.5)
GLUCOSE BLD-MCNC: 119 MG/DL (ref 65–99)
GLUCOSE BLD-MCNC: 131 MG/DL (ref 65–99)
GLUCOSE BLD-MCNC: 143 MG/DL (ref 65–99)
GLUCOSE BLD-MCNC: 144 MG/DL (ref 65–99)
GLUCOSE SERPL-MCNC: 144 MG/DL (ref 65–99)
HCT VFR BLD AUTO: 44 % (ref 37–47)
HGB BLD-MCNC: 14.1 G/DL (ref 12–16)
MAGNESIUM SERPL-MCNC: 1.9 MG/DL (ref 1.5–2.5)
MCH RBC QN AUTO: 30.6 PG (ref 27–33)
MCHC RBC AUTO-ENTMCNC: 32 G/DL (ref 33.6–35)
MCV RBC AUTO: 95.4 FL (ref 81.4–97.8)
METHADONE UR QL SCN: NEGATIVE
OPIATES UR QL SCN: NEGATIVE
OXYCODONE UR QL SCN: NEGATIVE
PCP UR QL SCN: NEGATIVE
PLATELET # BLD AUTO: 216 K/UL (ref 164–446)
PMV BLD AUTO: 12 FL (ref 9–12.9)
POTASSIUM SERPL-SCNC: 4.9 MMOL/L (ref 3.6–5.5)
PROCALCITONIN SERPL-MCNC: 0.09 NG/ML
PROPOXYPH UR QL SCN: NEGATIVE
PROT SERPL-MCNC: 5.7 G/DL (ref 6–8.2)
RBC # BLD AUTO: 4.61 M/UL (ref 4.2–5.4)
SODIUM SERPL-SCNC: 140 MMOL/L (ref 135–145)
T4 FREE SERPL-MCNC: 0.62 NG/DL (ref 0.93–1.7)
TROPONIN T SERPL-MCNC: 14 NG/L (ref 6–19)
TROPONIN T SERPL-MCNC: 14 NG/L (ref 6–19)
TSH SERPL DL<=0.005 MIU/L-ACNC: 0.11 UIU/ML (ref 0.38–5.33)
WBC # BLD AUTO: 7.7 K/UL (ref 4.8–10.8)

## 2021-05-11 PROCEDURE — 93005 ELECTROCARDIOGRAM TRACING: CPT | Performed by: FAMILY MEDICINE

## 2021-05-11 PROCEDURE — 99233 SBSQ HOSP IP/OBS HIGH 50: CPT | Performed by: FAMILY MEDICINE

## 2021-05-11 PROCEDURE — 82962 GLUCOSE BLOOD TEST: CPT | Mod: 91

## 2021-05-11 PROCEDURE — 700101 HCHG RX REV CODE 250: Performed by: STUDENT IN AN ORGANIZED HEALTH CARE EDUCATION/TRAINING PROGRAM

## 2021-05-11 PROCEDURE — 770020 HCHG ROOM/CARE - TELE (206)

## 2021-05-11 PROCEDURE — 94640 AIRWAY INHALATION TREATMENT: CPT

## 2021-05-11 PROCEDURE — 80307 DRUG TEST PRSMV CHEM ANLYZR: CPT

## 2021-05-11 PROCEDURE — 83735 ASSAY OF MAGNESIUM: CPT

## 2021-05-11 PROCEDURE — A9270 NON-COVERED ITEM OR SERVICE: HCPCS | Performed by: STUDENT IN AN ORGANIZED HEALTH CARE EDUCATION/TRAINING PROGRAM

## 2021-05-11 PROCEDURE — 700111 HCHG RX REV CODE 636 W/ 250 OVERRIDE (IP): Performed by: FAMILY MEDICINE

## 2021-05-11 PROCEDURE — 93010 ELECTROCARDIOGRAM REPORT: CPT | Performed by: INTERNAL MEDICINE

## 2021-05-11 PROCEDURE — 700105 HCHG RX REV CODE 258: Performed by: STUDENT IN AN ORGANIZED HEALTH CARE EDUCATION/TRAINING PROGRAM

## 2021-05-11 PROCEDURE — 84443 ASSAY THYROID STIM HORMONE: CPT

## 2021-05-11 PROCEDURE — 700111 HCHG RX REV CODE 636 W/ 250 OVERRIDE (IP): Performed by: STUDENT IN AN ORGANIZED HEALTH CARE EDUCATION/TRAINING PROGRAM

## 2021-05-11 PROCEDURE — 84145 PROCALCITONIN (PCT): CPT

## 2021-05-11 PROCEDURE — 85027 COMPLETE CBC AUTOMATED: CPT

## 2021-05-11 PROCEDURE — 84439 ASSAY OF FREE THYROXINE: CPT

## 2021-05-11 PROCEDURE — 84484 ASSAY OF TROPONIN QUANT: CPT

## 2021-05-11 PROCEDURE — 700102 HCHG RX REV CODE 250 W/ 637 OVERRIDE(OP): Performed by: STUDENT IN AN ORGANIZED HEALTH CARE EDUCATION/TRAINING PROGRAM

## 2021-05-11 PROCEDURE — 80053 COMPREHEN METABOLIC PANEL: CPT

## 2021-05-11 PROCEDURE — 82533 TOTAL CORTISOL: CPT | Mod: 91

## 2021-05-11 PROCEDURE — 94760 N-INVAS EAR/PLS OXIMETRY 1: CPT

## 2021-05-11 RX ORDER — DEXAMETHASONE SODIUM PHOSPHATE 10 MG/ML
10 INJECTION, SOLUTION INTRAMUSCULAR; INTRAVENOUS ONCE
Status: COMPLETED | OUTPATIENT
Start: 2021-05-11 | End: 2021-05-11

## 2021-05-11 RX ORDER — COSYNTROPIN 0.25 MG/ML
0.25 INJECTION, POWDER, FOR SOLUTION INTRAMUSCULAR; INTRAVENOUS ONCE
Status: COMPLETED | OUTPATIENT
Start: 2021-05-11 | End: 2021-05-11

## 2021-05-11 RX ADMIN — IPRATROPIUM BROMIDE AND ALBUTEROL SULFATE 3 ML: .5; 2.5 SOLUTION RESPIRATORY (INHALATION) at 07:34

## 2021-05-11 RX ADMIN — MEROPENEM 1 G: 1 INJECTION, POWDER, FOR SOLUTION INTRAVENOUS at 22:24

## 2021-05-11 RX ADMIN — GABAPENTIN 400 MG: 400 CAPSULE ORAL at 17:22

## 2021-05-11 RX ADMIN — IPRATROPIUM BROMIDE AND ALBUTEROL SULFATE 3 ML: .5; 2.5 SOLUTION RESPIRATORY (INHALATION) at 14:51

## 2021-05-11 RX ADMIN — MONTELUKAST 10 MG: 10 TABLET, FILM COATED ORAL at 17:24

## 2021-05-11 RX ADMIN — LIOTHYRONINE SODIUM 25 MCG: 5 TABLET ORAL at 20:42

## 2021-05-11 RX ADMIN — IPRATROPIUM BROMIDE AND ALBUTEROL SULFATE 3 ML: .5; 2.5 SOLUTION RESPIRATORY (INHALATION) at 10:25

## 2021-05-11 RX ADMIN — CETIRIZINE HYDROCHLORIDE 20 MG: 10 TABLET, FILM COATED ORAL at 08:31

## 2021-05-11 RX ADMIN — AMITRIPTYLINE HYDROCHLORIDE 10 MG: 10 TABLET, FILM COATED ORAL at 17:22

## 2021-05-11 RX ADMIN — IPRATROPIUM BROMIDE AND ALBUTEROL SULFATE 3 ML: .5; 2.5 SOLUTION RESPIRATORY (INHALATION) at 22:47

## 2021-05-11 RX ADMIN — COSYNTROPIN 250 MCG: 0.25 INJECTION, POWDER, LYOPHILIZED, FOR SOLUTION INTRAVENOUS at 13:54

## 2021-05-11 RX ADMIN — APIXABAN 5 MG: 5 TABLET, FILM COATED ORAL at 06:23

## 2021-05-11 RX ADMIN — PREDNISONE 40 MG: 20 TABLET ORAL at 06:23

## 2021-05-11 RX ADMIN — MEROPENEM 1 G: 1 INJECTION, POWDER, FOR SOLUTION INTRAVENOUS at 06:14

## 2021-05-11 RX ADMIN — APIXABAN 5 MG: 5 TABLET, FILM COATED ORAL at 17:23

## 2021-05-11 RX ADMIN — CETIRIZINE HYDROCHLORIDE 20 MG: 10 TABLET, FILM COATED ORAL at 20:42

## 2021-05-11 RX ADMIN — MEROPENEM 1 G: 1 INJECTION, POWDER, FOR SOLUTION INTRAVENOUS at 13:44

## 2021-05-11 RX ADMIN — LEVOTHYROXINE SODIUM 75 MCG: 0.07 TABLET ORAL at 17:23

## 2021-05-11 RX ADMIN — COLESEVELAM HYDROCHLORIDE 625 MG: 625 TABLET, FILM COATED ORAL at 16:51

## 2021-05-11 RX ADMIN — GABAPENTIN 400 MG: 400 CAPSULE ORAL at 06:23

## 2021-05-11 RX ADMIN — ACETAMINOPHEN 650 MG: 325 TABLET, FILM COATED ORAL at 08:30

## 2021-05-11 RX ADMIN — COLESEVELAM HYDROCHLORIDE 625 MG: 625 TABLET, FILM COATED ORAL at 11:26

## 2021-05-11 RX ADMIN — GABAPENTIN 400 MG: 400 CAPSULE ORAL at 11:26

## 2021-05-11 RX ADMIN — COLESEVELAM HYDROCHLORIDE 625 MG: 625 TABLET, FILM COATED ORAL at 07:59

## 2021-05-11 RX ADMIN — ACETAMINOPHEN 650 MG: 325 TABLET, FILM COATED ORAL at 00:48

## 2021-05-11 RX ADMIN — IPRATROPIUM BROMIDE AND ALBUTEROL SULFATE 3 ML: .5; 2.5 SOLUTION RESPIRATORY (INHALATION) at 19:32

## 2021-05-11 RX ADMIN — AMITRIPTYLINE HYDROCHLORIDE 10 MG: 10 TABLET, FILM COATED ORAL at 06:22

## 2021-05-11 RX ADMIN — IPRATROPIUM BROMIDE AND ALBUTEROL SULFATE 3 ML: .5; 2.5 SOLUTION RESPIRATORY (INHALATION) at 03:18

## 2021-05-11 RX ADMIN — DEXAMETHASONE SODIUM PHOSPHATE 10 MG: 10 INJECTION INTRAMUSCULAR; INTRAVENOUS at 13:52

## 2021-05-11 RX ADMIN — DULOXETINE HYDROCHLORIDE 60 MG: 30 CAPSULE, DELAYED RELEASE ORAL at 20:42

## 2021-05-11 ASSESSMENT — PAIN DESCRIPTION - PAIN TYPE
TYPE: ACUTE PAIN;CHRONIC PAIN
TYPE: ACUTE PAIN;CHRONIC PAIN
TYPE: ACUTE PAIN

## 2021-05-11 ASSESSMENT — ENCOUNTER SYMPTOMS
NAUSEA: 0
ABDOMINAL PAIN: 0
HEARTBURN: 0
DIARRHEA: 0
WHEEZING: 1
CHILLS: 0
PALPITATIONS: 0
COUGH: 1
FEVER: 0
SHORTNESS OF BREATH: 1
VOMITING: 0

## 2021-05-11 ASSESSMENT — FIBROSIS 4 INDEX: FIB4 SCORE: 2.11

## 2021-05-11 NOTE — PROGRESS NOTES
[facility] Medicine Daily Progress Note    Date of Service  5/11/2021    Chief Complaint  55 y.o. female admitted 5/10/2021 with SOB and lactic acidosis    Hospital Course  Donna Isaac is a 55 y.o. female who presents to the emergency department for evaluation of shortness of breath and leg swelling.  Stated symptoms started about 2 weeks ago with gradual progression. States with 10-20 feet ambulation she gets significantly short of breath. Also states she has been having symptoms getting herself dressed in the morning. Associated symptoms include leg swelling, cough and wheezing.   Also states she has been ill for some time with multiple chronic issues including chronic long-term antibiotics for polymicrobial sinusitis. She has been followed by [facility] and is currently on meropenem and Xarava for 5 weeks therapy.   Of note, she was recently in the ED for chest pain and shortness of breath, worked up and found to have a pulmonary embolism. She was started on Eliquis which she reports compliance.       In the ER, vital signs notable for hypertension. Labs with findings of elevated lactic acid. CT chest with no findings of acute PE or infiltrate/consolidation.       Interval Problem Update  5/11 - No clear etiology for pt's SOB or FLORES - will place on telemetry, EKG obtained has some sinus tach.  In reviewing old records, had low cortisol levels in the past - will give 10mg Decadron and check cosyntropin stim test. Was hypotensive in the ER but no longer hypotensive now. States she does still feel luther SOB, is on 2L. Also states she has required O2 in the past after a bout of PNA.    Consultants/Specialty  none    Code Status  Full Code    Disposition  TBD     Review of Systems  Review of Systems   Constitutional: Negative for chills and fever.   Respiratory: Positive for cough, shortness of breath and wheezing (Pt affirms wheezing, none on exam).    Cardiovascular: Negative for chest pain,  palpitations and leg swelling.   Gastrointestinal: Negative for abdominal pain, diarrhea, heartburn, nausea and vomiting.   All other systems reviewed and are negative.       Physical Exam  Temp:  [36.3 °C (97.3 °F)-37.3 °C (99.1 °F)] 36.9 °C (98.4 °F)  Pulse:  [] 81  Resp:  [16-20] 19  BP: (104-150)/() 138/78  SpO2:  [92 %-100 %] 92 %    Physical Exam  Vitals and nursing note reviewed.   Constitutional:       Appearance: Normal appearance. She is not toxic-appearing.   HENT:      Head: Normocephalic and atraumatic.      Mouth/Throat:      Mouth: Mucous membranes are moist.   Cardiovascular:      Rate and Rhythm: Regular rhythm. Tachycardia present.      Heart sounds: No murmur.   Pulmonary:      Effort: Pulmonary effort is normal. No respiratory distress.      Breath sounds: No wheezing, rhonchi or rales.      Comments: Diminished bilaterally without wheezing  Abdominal:      General: Abdomen is flat. Bowel sounds are normal.      Palpations: Abdomen is soft.   Musculoskeletal:         General: No swelling.   Skin:     General: Skin is warm and dry.   Neurological:      Mental Status: She is alert and oriented to person, place, and time.   Psychiatric:         Mood and Affect: Mood normal.         Behavior: Behavior normal.         Fluids    Intake/Output Summary (Last 24 hours) at 5/11/2021 1256  Last data filed at 5/11/2021 0800  Gross per 24 hour   Intake 2865.83 ml   Output --   Net 2865.83 ml       Laboratory  Recent Labs     05/10/21  1015 05/11/21  0309   WBC 3.5* 7.7   RBC 4.49 4.61   HEMOGLOBIN 13.7 14.1   HEMATOCRIT 43.0 44.0   MCV 95.8 95.4   MCH 30.5 30.6   MCHC 31.9* 32.0*   RDW 57.4* 57.5*   PLATELETCT 221 216   MPV 11.6 12.0     Recent Labs     05/10/21  1015 05/11/21  0309   SODIUM 136 140   POTASSIUM 4.4 4.9   CHLORIDE 100 103   CO2 23 21   GLUCOSE 118* 144*   BUN 16 12   CREATININE 1.27 1.13   CALCIUM 8.6 8.6                   Imaging  EC-ECHOCARDIOGRAM LTD W/O CONT   Final Result       CT-CTA CHEST PULMONARY ARTERY W/ RECONS   Final Result      1.  No CT evidence of pulmonary embolism. Left lower lobe pulmonary embolus seen on prior CT is not appreciated on the current exam.      2.  Mild pulmonary opacifications noted as described above. Atelectasis may be present. Mild edema or pneumonitis is also possible.      3.  Hepatic steatosis.            DX-CHEST-PORTABLE (1 VIEW)   Final Result      No acute cardiopulmonary findings.           Assessment/Plan  * FLORES (dyspnea on exertion)  Assessment & Plan  - Presenting with dyspnea on exertion with 10 to 20 feet of ambulation.  - Recently diagnosed with PTE in April, CTA this stay without evidence of PTE, continues on Eliquis - CT did show possible edema or pneumonitis, but pt does not appear volume overloaded   - had wheezing on exam per H&P on admit, home Prednisone was increased - no longer wheezing but may be cause of SOB   - Echo without evidence of recurrence of her cardiomyopathy, no peripheral edema on exam today  - Continue prednisone and breathing treatments   - Ambulatory O2 sat today     Acute sinusitis- (present on admission)  Assessment & Plan  - Pt was started on Merrem and Xerava on 4/20 by Lynnette GERMAN  - MRSA and Pseudomonas by culture per ID   - Continue Merrem and Xerava, pt will have someone bring her the Xerava she has at home as we do not carry.  - consult Lynnette ID.    Hypotension  Assessment & Plan  - Check a procalcitonin   - Check a cosyntropin stim test      Steroid dependence (HCC)- (present on admission)  Assessment & Plan  - On decadron chronically, pt unsure what for  - Notes from Vascular indicate it is for her periodic hypotension started by Endocrine  - Check cosyntropin stim test, last cortisol level was low on previous admission    Sinus tachycardia  Assessment & Plan  - TSH and free T4 a little askew, will check T3 as she is on Synthroid and Cytomel   - Trend troponins  - Does not appear volume depleted     Hx of  migraines  Assessment & Plan  We will hold home migraine medications inpatient.    Pulmonary embolism (HCC)  Assessment & Plan  Diagnosed about 2 weeks ago.  Continue home Eliquis medication.  Repeat CTA chest here without any findings.    Lactic acidosis  Assessment & Plan  Resolved with IV hydration     Hypertension- (present on admission)  Assessment & Plan  Will hold home antihypertensive due to presenting symptoms of hypotension.  Closely monitor blood pressure.    Thyroid disease- (present on admission)  Assessment & Plan  Continue home thyroid supplementations. TSH low but free T4 also low, will check a T3 level as she is on T3 supplementation     Class 1 obesity in adult- (present on admission)  Assessment & Plan  Weight loss and dietary counseling advised.    Fibromyalgia- (present on admission)  Assessment & Plan  Continue home meds        VTE prophylaxis: Eliquis

## 2021-05-11 NOTE — PROGRESS NOTES
2 RN Skin Check    2 RN skin check complete.   Devices in place: Nasal Cannula, Single Lumen PICC  Skin assessed under devices: yes.  Confirmed pressure ulcers found on: N/A.  New potential pressure ulcers noted on N/A. Wound consult placed No.  The following interventions in place Pillows, Lotion and Grey oxy ears.    Pt assessed with AILYN Horner. Pt has scattered bruising on BUE. Both thumbs are dry and cracked, covered with a bandaid. Small scab noted on R lower back. 2 scabbed wounds noted on pt's abdomen that are open to air. Old scabs and abrasions noted on both knees. Pt has a small deep crack on L heel that is dry, intact, and open to air. Heel wound is noted in flowsheets with images in place. All other bony prominences assessed and are intact.

## 2021-05-11 NOTE — ASSESSMENT & PLAN NOTE
- Procalcitonin is normal, UA benign, no infiltrate on chest imaging, skin survey without acute infection  - Hypotension may have contribution from adrenal insufficiency, hydrocortisone started  - Rise in white count  likely secondary to decadron and continued prednisone, procalcitonin remains normal, pt without fever  - Pt became hypotensive 5/12 after receiving home Coreg for tachycardia - did much better on 12.5mg dosing

## 2021-05-11 NOTE — PROGRESS NOTES
Pt's friend had home IV antibiotics (Xarava) dropped off in ER. Antibiotics delivered to pharmacy.

## 2021-05-11 NOTE — PROGRESS NOTES
Report received from AILYN Uribe. Plan of care discussed at patient's bedside. Whiteboard has been updated. Pt is resting comfortably in bed and declines any further needs at this time. Safety precautions in place, bed alarm on, and call light within reach.

## 2021-05-11 NOTE — ASSESSMENT & PLAN NOTE
- TSH and free T4 a little askew, but free T3 is normal - as T4 is slightly low, very unlikely this would contribute to her tachycardia   - EKG with question of an accelerated junctional rhythm/PSVT - discussed with Dr Alvarenga, gave 6mg of adenosine, rate did not slow significantly. Administered 12mg and rate slowed with P waves present suggesting sinus component.   - Pt with normal procal, negative UA, no significant infiltrate on imaging, and is on appropriate antibiotics for her known MRSA/Pseudomonas sinusitis per [facility] ID. White count is up today, but likely due to steroid administration.  - Bps dropped with resumption of her home Coreg, decreased dose to 12.5mg BID   - Blood cultures negative  - Recently had a PTE but no longer seen on imaging here   - Changed Albuterol to Xopenex

## 2021-05-11 NOTE — FLOWSHEET NOTE
05/10/21 1930   Events/Summary/Plan   Events/Summary/Plan SVN Tx Given   Vital Signs   Pulse (!) 102   Respiration 18   Pulse Oximetry 94 %   $ Pulse Oximetry (Spot Check) Yes   O2 Alarms Set & Reviewed Yes   Respiratory Assessment   Respiratory Pattern Within Normal Limits   Level of Consciousness Alert   Chest Exam   Work Of Breathing / Effort Shallow   Breath Sounds   RUL Breath Sounds Diminished   RML Breath Sounds Diminished   RLL Breath Sounds Diminished   TRISTAN Breath Sounds Diminished   LLL Breath Sounds Diminished   Oxygen   O2 (LPM) 1.5   O2 Delivery Device Silicone Nasal Cannula

## 2021-05-11 NOTE — FLOWSHEET NOTE
05/10/21 2310   Events/Summary/Plan   Events/Summary/Plan SVN Tx Given   Vital Signs   Pulse 88   Respiration 18   Pulse Oximetry 92 %   $ Pulse Oximetry (Spot Check) Yes   O2 Alarms Set & Reviewed Yes   Respiratory Assessment   Respiratory Pattern Within Normal Limits   Level of Consciousness Alert   Chest Exam   Work Of Breathing / Effort Mild   Breath Sounds   RUL Breath Sounds Diminished   RML Breath Sounds Diminished   RLL Breath Sounds Diminished   TRISTAN Breath Sounds Diminished   LLL Breath Sounds Diminished   Oxygen   O2 (LPM) 1.5   O2 Delivery Device Nasal Cannula

## 2021-05-11 NOTE — FLOWSHEET NOTE
05/10/21 1930   Inhalation Therapy Treatment   $ SVN Performed Yes   Given By: Mouthpiece   Incentive Spirometry Treatment   Incentive Spirometer Volume 1250 mL

## 2021-05-11 NOTE — FLOWSHEET NOTE
05/11/21 0318   Events/Summary/Plan   Events/Summary/Plan SVN TX Given    Skin Inspection Respiratory Device Intact   Vital Signs   Pulse 86   Respiration 18   Pulse Oximetry 92 %   $ Pulse Oximetry (Spot Check) Yes   O2 Alarms Set & Reviewed Yes   Respiratory Assessment   Respiratory Pattern Within Normal Limits   Level of Consciousness Alert   Chest Exam   Work Of Breathing / Effort Mild   Breath Sounds   RUL Breath Sounds Diminished   RML Breath Sounds Diminished   RLL Breath Sounds Diminished   TRISTAN Breath Sounds Diminished   LLL Breath Sounds Diminished   Oxygen   O2 (LPM) 1.5   O2 Delivery Device Nasal Cannula

## 2021-05-11 NOTE — PROGRESS NOTES
Assumed pt care. Pt alert and oriented. Pt was oriented to room. Safety education provided. Assessment started.

## 2021-05-11 NOTE — CARE PLAN
Problem: Infection  Goal: Will remain free from infection  Outcome: PROGRESSING AS EXPECTED  Note: IV primary and secondary tubing changed. Line swabbed with alcohol and flushed with NS prior to access. Pt receiving antibiotics.     Problem: Venous Thromboembolism (VTW)/Deep Vein Thrombosis (DVT) Prevention:  Goal: Patient will participate in Venous Thrombosis (VTE)/Deep Vein Thrombosis (DVT)Prevention Measures  Outcome: PROGRESSING AS EXPECTED  Flowsheets  Taken 5/11/2021 0901  AV Foot Pumps: Refused  Taken 5/11/2021 0800  Mechanical Prophylaxis: SCDs, Sequential Compression Device  Note: Pt assessed for leg swelling and tenderness. Pt encouraged to ambulate as tolerated and SCD's provided.

## 2021-05-11 NOTE — CARE PLAN
Problem: Communication  Goal: The ability to communicate needs accurately and effectively will improve  Outcome: PROGRESSING AS EXPECTED  Note: Pt is able to fully express and communicate needs appropriately. POC discussed and pt verbalized understanding.       Problem: Safety  Goal: Will remain free from injury  Outcome: PROGRESSING AS EXPECTED  Note: Pt has remained free from falls and all safety measures are put in place.       Problem: Venous Thromboembolism (VTW)/Deep Vein Thrombosis (DVT) Prevention:  Goal: Patient will participate in Venous Thrombosis (VTE)/Deep Vein Thrombosis (DVT)Prevention Measures  Outcome: PROGRESSING AS EXPECTED  Note: Pt is self motivated to continue to engage in ambulation and ROM exercises. Pt was educated on importance of DVT prophylaxis using both pharmacological and non-pharmacological methods. Pt was educated on Eliquis administration as a prophylactic DVT medication and is compliant with it's use.       Problem: Knowledge Deficit  Goal: Knowledge of disease process/condition, treatment plan, diagnostic tests, and medications will improve  Outcome: PROGRESSING AS EXPECTED  Note: Pt educated on admitting condition, diagnostic tests, and medications associated with POC.       Problem: Respiratory:  Goal: Respiratory status will improve  Outcome: PROGRESSING AS EXPECTED  Note: Pt is compliant with incentive spirometer use and cough and deep breathing techniques. Pt is also compliant with nebulizer treatments provided by RT in order to improve breathing and lung expansion.

## 2021-05-11 NOTE — PROGRESS NOTES
Telemetry Shift Summary    Rhythm ST  HR Range 111-153   Ectopy N/A  Measurements 0.20/0.08/0.28        Normal Values  Rhythm SR  HR Range    Measurements 0.12-0.20 / 0.06-0.10  / 0.30-0.52

## 2021-05-11 NOTE — PROGRESS NOTES
Assumed care of pt.  Received report from AILYN Sexton.  Pt sitting up in bed.  Safety precautions in place. Bed locked and in lowest position, call light within reach. No needs at this time.

## 2021-05-12 PROBLEM — E27.1 ADRENAL INSUFFICIENCY (ADDISON'S DISEASE) (HCC): Status: ACTIVE | Noted: 2021-05-12

## 2021-05-12 LAB
ALBUMIN SERPL BCP-MCNC: 2.9 G/DL (ref 3.2–4.9)
ALBUMIN/GLOB SERPL: 1.1 G/DL
ALP SERPL-CCNC: 169 U/L (ref 30–99)
ALT SERPL-CCNC: 62 U/L (ref 2–50)
ANION GAP SERPL CALC-SCNC: 12 MMOL/L (ref 7–16)
AST SERPL-CCNC: 65 U/L (ref 12–45)
BASOPHILS # BLD AUTO: 0.1 % (ref 0–1.8)
BASOPHILS # BLD: 0.01 K/UL (ref 0–0.12)
BILIRUB SERPL-MCNC: 0.6 MG/DL (ref 0.1–1.5)
BUN SERPL-MCNC: 15 MG/DL (ref 8–22)
CALCIUM SERPL-MCNC: 8.4 MG/DL (ref 8.4–10.2)
CHLORIDE SERPL-SCNC: 101 MMOL/L (ref 96–112)
CO2 SERPL-SCNC: 22 MMOL/L (ref 20–33)
CORTIS SERPL-MCNC: 11.5 UG/DL (ref 0–23)
CORTIS SERPL-MCNC: 9.1 UG/DL (ref 0–23)
CREAT SERPL-MCNC: 0.93 MG/DL (ref 0.5–1.4)
EKG IMPRESSION: NORMAL
EOSINOPHIL # BLD AUTO: 0 K/UL (ref 0–0.51)
EOSINOPHIL NFR BLD: 0 % (ref 0–6.9)
ERYTHROCYTE [DISTWIDTH] IN BLOOD BY AUTOMATED COUNT: 56.9 FL (ref 35.9–50)
GLOBULIN SER CALC-MCNC: 2.7 G/DL (ref 1.9–3.5)
GLUCOSE BLD-MCNC: 128 MG/DL (ref 65–99)
GLUCOSE BLD-MCNC: 130 MG/DL (ref 65–99)
GLUCOSE BLD-MCNC: 141 MG/DL (ref 65–99)
GLUCOSE BLD-MCNC: 147 MG/DL (ref 65–99)
GLUCOSE SERPL-MCNC: 148 MG/DL (ref 65–99)
GRAM STN SPEC: NORMAL
HCT VFR BLD AUTO: 41.9 % (ref 37–47)
HGB BLD-MCNC: 13.4 G/DL (ref 12–16)
IMM GRANULOCYTES # BLD AUTO: 0.09 K/UL (ref 0–0.11)
IMM GRANULOCYTES NFR BLD AUTO: 0.7 % (ref 0–0.9)
LACTATE BLD-SCNC: 3.4 MMOL/L (ref 0.5–2)
LYMPHOCYTES # BLD AUTO: 1.22 K/UL (ref 1–4.8)
LYMPHOCYTES NFR BLD: 8.8 % (ref 22–41)
MCH RBC QN AUTO: 29.8 PG (ref 27–33)
MCHC RBC AUTO-ENTMCNC: 32 G/DL (ref 33.6–35)
MCV RBC AUTO: 93.3 FL (ref 81.4–97.8)
MONOCYTES # BLD AUTO: 1.18 K/UL (ref 0–0.85)
MONOCYTES NFR BLD AUTO: 8.6 % (ref 0–13.4)
NEUTROPHILS # BLD AUTO: 11.3 K/UL (ref 2–7.15)
NEUTROPHILS NFR BLD: 81.8 % (ref 44–72)
NRBC # BLD AUTO: 0 K/UL
NRBC BLD-RTO: 0 /100 WBC
PLATELET # BLD AUTO: 268 K/UL (ref 164–446)
PMV BLD AUTO: 11.7 FL (ref 9–12.9)
POTASSIUM SERPL-SCNC: 5.2 MMOL/L (ref 3.6–5.5)
PROT SERPL-MCNC: 5.6 G/DL (ref 6–8.2)
RBC # BLD AUTO: 4.49 M/UL (ref 4.2–5.4)
SIGNIFICANT IND 70042: NORMAL
SITE SITE: NORMAL
SODIUM SERPL-SCNC: 135 MMOL/L (ref 135–145)
SOURCE SOURCE: NORMAL
T3FREE SERPL-MCNC: 2.46 PG/ML (ref 2–4.4)
WBC # BLD AUTO: 13.8 K/UL (ref 4.8–10.8)

## 2021-05-12 PROCEDURE — 82962 GLUCOSE BLOOD TEST: CPT | Mod: 91

## 2021-05-12 PROCEDURE — 770020 HCHG ROOM/CARE - TELE (206)

## 2021-05-12 PROCEDURE — 93005 ELECTROCARDIOGRAM TRACING: CPT | Performed by: FAMILY MEDICINE

## 2021-05-12 PROCEDURE — 700105 HCHG RX REV CODE 258: Performed by: FAMILY MEDICINE

## 2021-05-12 PROCEDURE — 83605 ASSAY OF LACTIC ACID: CPT

## 2021-05-12 PROCEDURE — 84481 FREE ASSAY (FT-3): CPT

## 2021-05-12 PROCEDURE — 700101 HCHG RX REV CODE 250: Performed by: STUDENT IN AN ORGANIZED HEALTH CARE EDUCATION/TRAINING PROGRAM

## 2021-05-12 PROCEDURE — 94760 N-INVAS EAR/PLS OXIMETRY 1: CPT

## 2021-05-12 PROCEDURE — 99233 SBSQ HOSP IP/OBS HIGH 50: CPT | Performed by: FAMILY MEDICINE

## 2021-05-12 PROCEDURE — 87070 CULTURE OTHR SPECIMN AEROBIC: CPT

## 2021-05-12 PROCEDURE — A9270 NON-COVERED ITEM OR SERVICE: HCPCS | Performed by: STUDENT IN AN ORGANIZED HEALTH CARE EDUCATION/TRAINING PROGRAM

## 2021-05-12 PROCEDURE — 93010 ELECTROCARDIOGRAM REPORT: CPT | Performed by: INTERNAL MEDICINE

## 2021-05-12 PROCEDURE — 99223 1ST HOSP IP/OBS HIGH 75: CPT | Performed by: INTERNAL MEDICINE

## 2021-05-12 PROCEDURE — 87205 SMEAR GRAM STAIN: CPT

## 2021-05-12 PROCEDURE — 94640 AIRWAY INHALATION TREATMENT: CPT

## 2021-05-12 PROCEDURE — 700105 HCHG RX REV CODE 258: Performed by: INTERNAL MEDICINE

## 2021-05-12 PROCEDURE — 700111 HCHG RX REV CODE 636 W/ 250 OVERRIDE (IP)

## 2021-05-12 PROCEDURE — 700111 HCHG RX REV CODE 636 W/ 250 OVERRIDE (IP): Performed by: STUDENT IN AN ORGANIZED HEALTH CARE EDUCATION/TRAINING PROGRAM

## 2021-05-12 PROCEDURE — 700105 HCHG RX REV CODE 258: Performed by: STUDENT IN AN ORGANIZED HEALTH CARE EDUCATION/TRAINING PROGRAM

## 2021-05-12 PROCEDURE — 700111 HCHG RX REV CODE 636 W/ 250 OVERRIDE (IP): Performed by: FAMILY MEDICINE

## 2021-05-12 PROCEDURE — 85025 COMPLETE CBC W/AUTO DIFF WBC: CPT

## 2021-05-12 PROCEDURE — A9270 NON-COVERED ITEM OR SERVICE: HCPCS | Performed by: FAMILY MEDICINE

## 2021-05-12 PROCEDURE — 700102 HCHG RX REV CODE 250 W/ 637 OVERRIDE(OP): Performed by: STUDENT IN AN ORGANIZED HEALTH CARE EDUCATION/TRAINING PROGRAM

## 2021-05-12 PROCEDURE — 700101 HCHG RX REV CODE 250: Performed by: FAMILY MEDICINE

## 2021-05-12 PROCEDURE — 700102 HCHG RX REV CODE 250 W/ 637 OVERRIDE(OP): Performed by: FAMILY MEDICINE

## 2021-05-12 PROCEDURE — 80053 COMPREHEN METABOLIC PANEL: CPT

## 2021-05-12 RX ORDER — ADENOSINE 3 MG/ML
6 INJECTION, SOLUTION INTRAVENOUS ONCE
Status: COMPLETED | OUTPATIENT
Start: 2021-05-12 | End: 2021-05-12

## 2021-05-12 RX ORDER — SODIUM CHLORIDE 9 MG/ML
500 INJECTION, SOLUTION INTRAVENOUS ONCE
Status: COMPLETED | OUTPATIENT
Start: 2021-05-12 | End: 2021-05-12

## 2021-05-12 RX ORDER — LEVALBUTEROL INHALATION SOLUTION 1.25 MG/3ML
1.25 SOLUTION RESPIRATORY (INHALATION)
Status: DISCONTINUED | OUTPATIENT
Start: 2021-05-12 | End: 2021-05-15 | Stop reason: HOSPADM

## 2021-05-12 RX ORDER — DIPHENHYDRAMINE HYDROCHLORIDE 50 MG/ML
INJECTION INTRAMUSCULAR; INTRAVENOUS
Status: COMPLETED
Start: 2021-05-12 | End: 2021-05-12

## 2021-05-12 RX ORDER — HYDROCORTISONE 10 MG/1
5 TABLET ORAL
Status: DISCONTINUED | OUTPATIENT
Start: 2021-05-12 | End: 2021-05-15 | Stop reason: HOSPADM

## 2021-05-12 RX ORDER — HYDROCORTISONE 10 MG/1
10 TABLET ORAL EVERY MORNING
Status: DISCONTINUED | OUTPATIENT
Start: 2021-05-13 | End: 2021-05-15 | Stop reason: HOSPADM

## 2021-05-12 RX ORDER — ADENOSINE 3 MG/ML
12 INJECTION, SOLUTION INTRAVENOUS ONCE
Status: COMPLETED | OUTPATIENT
Start: 2021-05-12 | End: 2021-05-12

## 2021-05-12 RX ORDER — CARVEDILOL 25 MG/1
25 TABLET ORAL 2 TIMES DAILY WITH MEALS
Status: DISCONTINUED | OUTPATIENT
Start: 2021-05-12 | End: 2021-05-13

## 2021-05-12 RX ORDER — DIPHENHYDRAMINE HYDROCHLORIDE 50 MG/ML
25 INJECTION INTRAMUSCULAR; INTRAVENOUS ONCE
Status: COMPLETED | OUTPATIENT
Start: 2021-05-12 | End: 2021-05-12

## 2021-05-12 RX ORDER — SODIUM CHLORIDE 9 MG/ML
INJECTION, SOLUTION INTRAVENOUS CONTINUOUS
Status: DISCONTINUED | OUTPATIENT
Start: 2021-05-12 | End: 2021-05-12

## 2021-05-12 RX ORDER — CHOLECALCIFEROL (VITAMIN D3) 125 MCG
5 CAPSULE ORAL NIGHTLY
Status: DISCONTINUED | OUTPATIENT
Start: 2021-05-12 | End: 2021-05-15 | Stop reason: HOSPADM

## 2021-05-12 RX ADMIN — APIXABAN 5 MG: 5 TABLET, FILM COATED ORAL at 17:21

## 2021-05-12 RX ADMIN — MEROPENEM 1 G: 1 INJECTION, POWDER, FOR SOLUTION INTRAVENOUS at 05:51

## 2021-05-12 RX ADMIN — CETIRIZINE HYDROCHLORIDE 20 MG: 10 TABLET, FILM COATED ORAL at 08:15

## 2021-05-12 RX ADMIN — MEROPENEM 1 G: 1 INJECTION, POWDER, FOR SOLUTION INTRAVENOUS at 23:49

## 2021-05-12 RX ADMIN — SODIUM CHLORIDE 500 ML: 9 INJECTION, SOLUTION INTRAVENOUS at 22:41

## 2021-05-12 RX ADMIN — ADENOSINE 6 MG: 3 INJECTION, SOLUTION INTRAVENOUS at 13:28

## 2021-05-12 RX ADMIN — DILTIAZEM HYDROCHLORIDE 30 MG: 30 TABLET, FILM COATED ORAL at 15:55

## 2021-05-12 RX ADMIN — CETIRIZINE HYDROCHLORIDE 20 MG: 10 TABLET, FILM COATED ORAL at 20:18

## 2021-05-12 RX ADMIN — LEVOTHYROXINE SODIUM 75 MCG: 0.07 TABLET ORAL at 17:21

## 2021-05-12 RX ADMIN — ONDANSETRON 4 MG: 4 TABLET, ORALLY DISINTEGRATING ORAL at 14:56

## 2021-05-12 RX ADMIN — DIPHENHYDRAMINE HYDROCHLORIDE 25 MG: 50 INJECTION INTRAMUSCULAR; INTRAVENOUS at 02:30

## 2021-05-12 RX ADMIN — SODIUM CHLORIDE: 9 INJECTION, SOLUTION INTRAVENOUS at 15:57

## 2021-05-12 RX ADMIN — GABAPENTIN 400 MG: 400 CAPSULE ORAL at 12:43

## 2021-05-12 RX ADMIN — LIOTHYRONINE SODIUM 25 MCG: 5 TABLET ORAL at 20:18

## 2021-05-12 RX ADMIN — COLESEVELAM HYDROCHLORIDE 625 MG: 625 TABLET, FILM COATED ORAL at 12:43

## 2021-05-12 RX ADMIN — ADENOSINE 12 MG: 3 INJECTION, SOLUTION INTRAVENOUS at 13:40

## 2021-05-12 RX ADMIN — COLESEVELAM HYDROCHLORIDE 625 MG: 625 TABLET, FILM COATED ORAL at 17:21

## 2021-05-12 RX ADMIN — DIPHENHYDRAMINE HYDROCHLORIDE 25 MG: 50 INJECTION, SOLUTION INTRAMUSCULAR; INTRAVENOUS at 02:30

## 2021-05-12 RX ADMIN — MEROPENEM 1 G: 1 INJECTION, POWDER, FOR SOLUTION INTRAVENOUS at 14:23

## 2021-05-12 RX ADMIN — IPRATROPIUM BROMIDE AND ALBUTEROL SULFATE 3 ML: .5; 2.5 SOLUTION RESPIRATORY (INHALATION) at 07:16

## 2021-05-12 RX ADMIN — LEVALBUTEROL HYDROCHLORIDE 1.25 MG: 1.25 SOLUTION RESPIRATORY (INHALATION) at 21:31

## 2021-05-12 RX ADMIN — AMITRIPTYLINE HYDROCHLORIDE 10 MG: 10 TABLET, FILM COATED ORAL at 17:20

## 2021-05-12 RX ADMIN — HYDROCORTISONE 5 MG: 10 TABLET ORAL at 15:55

## 2021-05-12 RX ADMIN — COLESEVELAM HYDROCHLORIDE 625 MG: 625 TABLET, FILM COATED ORAL at 08:14

## 2021-05-12 RX ADMIN — MONTELUKAST 10 MG: 10 TABLET, FILM COATED ORAL at 17:21

## 2021-05-12 RX ADMIN — PREDNISONE 40 MG: 20 TABLET ORAL at 08:14

## 2021-05-12 RX ADMIN — ONDANSETRON 4 MG: 4 TABLET, ORALLY DISINTEGRATING ORAL at 05:45

## 2021-05-12 RX ADMIN — AMITRIPTYLINE HYDROCHLORIDE 10 MG: 10 TABLET, FILM COATED ORAL at 08:15

## 2021-05-12 RX ADMIN — Medication 5 MG: at 20:18

## 2021-05-12 RX ADMIN — GABAPENTIN 400 MG: 400 CAPSULE ORAL at 08:14

## 2021-05-12 RX ADMIN — IPRATROPIUM BROMIDE AND ALBUTEROL SULFATE 3 ML: .5; 2.5 SOLUTION RESPIRATORY (INHALATION) at 15:07

## 2021-05-12 RX ADMIN — CARVEDILOL 25 MG: 25 TABLET, FILM COATED ORAL at 18:40

## 2021-05-12 RX ADMIN — GABAPENTIN 400 MG: 400 CAPSULE ORAL at 17:21

## 2021-05-12 RX ADMIN — DULOXETINE HYDROCHLORIDE 60 MG: 30 CAPSULE, DELAYED RELEASE ORAL at 20:18

## 2021-05-12 RX ADMIN — APIXABAN 5 MG: 5 TABLET, FILM COATED ORAL at 08:15

## 2021-05-12 ASSESSMENT — ENCOUNTER SYMPTOMS
HEARTBURN: 0
WHEEZING: 1
ABDOMINAL PAIN: 0
NAUSEA: 0
FEVER: 0
PALPITATIONS: 0
DIARRHEA: 0
COUGH: 1
SHORTNESS OF BREATH: 1
CHILLS: 0
VOMITING: 0

## 2021-05-12 ASSESSMENT — PAIN DESCRIPTION - PAIN TYPE: TYPE: CHRONIC PAIN

## 2021-05-12 NOTE — PROGRESS NOTES
1328 Dr. Andrews at bedside, 6 mg of adenosine ordered for patient. Continuous ECG set up for patient at bedside, monitor tech notified, zoll at bedside as well as Dr. Andrews.     1340 Second dose of adenosine ordered and given with Dr. Andrews at bedside. Patient's HR down to 55 and back to 137. Dr. Andrews to update Cardiology for further orders.

## 2021-05-12 NOTE — PROGRESS NOTES
Continuity of Care Document (CCD)

 Created on:2019



Patient:Delta Rodriguez

Sex:Male

:1965

External Reference #:[id number]





Demographics







 Address  [address]



   [place] [address]

 

 Home Phone  [phone]

 

 Mobile Phone  [phone]

 

 Preferred Language  en

 

 Marital Status  Declined to Specify/Unknown

 

 Zoroastrianism Affiliation  Unknown

 

 Race  Unknown

 

 Ethnic Group  Declined to Specify/Unknown









Author







 Name  Jonathan E. Cryer, MD

 

 Address  [address]



   [place] [address]









Care Team Providers







 Name  Role  Phone

 

 Bridger Webb MD  Care Team Information   [phone]

 

 Lorenzo Rosenthal M.D. - [employer],  Care Team Information   [phone]



 Cardiovascular    









Problems







 Active Problems  Provider  Date

 

 Acute pharyngitis  Jonathan E. Cryer, MD  Onset: 10/07/2011

 

 Perforation of tympanic membrane  Jonathan E. Cryer, MD  Onset: 10/07/2011

 

 Dysfunction of eustachian tube  Jonathan E. Cryer, MD  Onset: 10/07/2011

 

 Middle ear conductive hearing loss  Jonathan E. Cryer, MD  Onset: 10/07/2011

 

 Sensorineural hearing loss  Jonathan E. Cryer, MD  Onset: 10/07/2011

 

 Disorder of nasal cavity  Yanelis Wooten NP  Onset: 10/07/2011

 

 Gastroesophageal reflux disease  Yanelis Wooten NP  Onset: 10/07/2011

 

 Allergic asthma without status asthmaticus  Yanelis Wooten NP  Onset: 10/07/
2011

 

 Peptic reflux disease  Yanelis Wooten NP  Onset: 10/07/2011

 

 Allergic rhinitis  Yanelis Wooten NP  Onset: 10/07/2011

 

 Impacted cerumen  Katelynn Trevino PA-C  Onset: 2018

 

 Chronic tympanitis  Katelynn Trevino PA-C  Onset: 2018







Social History







 Type  Date  Description  Comments

 

 Birth Sex    Unknown  

 

 Tobacco Use  Start: Unknown End:  Former Cigarette Smoker  for 25 years



   Unknown  Packs Daily 1  

 

 Tobacco Use  Start: Unknown  has never smoked cigars  

 

 Tobacco Use  Start: Unknown  has never smoked a pipe  

 

 Smokeless Tobacco    has never used smokeless  



     tobacco  

 

 ETOH Use    Current Alcohol Use  



     Occasionally  

 

 Recreational Drug Use    denies drug use  

 

 Tobacco Use  Start: Unknown End:  Patient is a former smoker  



   Unknown    

 

 Smoking Status  Reviewed: 18  Patient is a former smoker  







Allergies, Adverse Reactions, Alerts







 Active Allergies  Reaction  Severity  Comments  Date

 

 Augmentin XR        2008

 

 Penicillins  neck, tongue swells      2013









 Inactive Allergies









 NKDA        2005







Medications







 Active Medications  SIG  Qnty  Indications  Ordering  Date



         Provider  

 

 Omeprazole  40mg po bid for 1  60caps  530.81  Saeid BOWLES  2011



          40mg  month for      Strominger,  



 Capsules   esophagkaylie MAC  



           

 

 Advair Hfa  2 Puffs bid With  1units  478.19  Saeid BOWLES  2008



          115/21  Spacer      Joy Ann M.D.  



           



      Please Provide        



   Spacer        

 

 Famotidine        Bridger Webb  



          20mg Tablets        MD  



           

 

 Xarelto        Bridger Webb  



       20mg Tablets        MD  



           

 

 Clopidogrel Bisulfate        Bridger Webb  



         MD  



 75mg Tablets          



           

 

 Atorvastatin Calcium        Lorenzo Rosenthal  



         MLottieDLottie  



 20mg Tablets          



           

 

 Proair HFA        Bridger Webb  



          108(90Base)        MD  



 mcg/Act Aerosol          



           

 

 Advair Diskus        Bridger Webb  



         MD  



 250-50mcg/Dose          



 Aerosol          



           

 

 Triamterene/Hydrochlo        Bridger Webb  



 rothiazide        MD  



          37.5-25mg          



 Capsules          



           

 

 Metoprolol Succinate        Unknown  



 ER          



  50mg Tablets ER 24HR          



           

 

 Klor-Con M20        Lorenzo Rosenthal  



            20Meq        M.DLottie  



 Tablets ER          



           

 

 Atorvastatin Calcium  1 po qd      Unknown  



           



 40mg Tablets          



           

 

 Ranexa  1 po bid      Unknown  



      500mg Tablets ER          



 12HR          

 

 Nitroglycerin  prn Only      Bridger Webb  



             ?mg        MD  



           

 

 Aspirin        Unknown  



       81mg Tablets           



           

 

 Bystolic  qd      Unknown  



        5mg          



           

 

 Albuterol Inhalation  2 Puffs Q4H prn  2units    Wilber ISLAS  



         Cryer, MD  



 90mcg/Dose Aerosol          



           







Immunizations







 CPT Code  Status  Date  Vaccine  Lot #

 

 [id number]  Ordered  10/01/2015  Influenza Virus Vaccine, 3 Years Of Age And Above,
  



       Intramuscular  

 

 77695  Given  Unknown  Influenza Virus Vaccine, 3 Years Of Age And Above,  



       Intramuscular  







Vital Signs







 Date  Vital  Result  Comment

 

 2019 10:11am  Weight  344.00 lb  









 Weight  156.038 kg  

 

 Height  69 inches  5'9"

 

 Height in cm's  175.3 cm  

 

 BMI (Body Mass Index)  50.8 kg/m2  









 2019 10:07am  Weight  347.00 lb  









 Weight  157.399 kg  

 

 Height  69 inches  5'9"

 

 Height in cm's  175.3 cm  

 

 BMI (Body Mass Index)  51.2 kg/m2  







Results







 Description

 

 No Information Available







Procedures







 Description

 

 No Information Available







Medical Devices







 Description

 

 No Information Available







Encounters







 Type  Date  Location  Provider  Dx  Diagnosis

 

 Office Visit  2019  [place]  Wilber ISLAS  H70.92  Unspecified



   10:30a  08  Cryer, MD    mastoiditis, left



           ear

 

 Office Visit  2019  [place]  Wilber ISLAS  H70.92  Unspecified



   10:30a  08  Cryer, MD    mastoiditis, left



           ear







Assessments







 Date  Code  Description  Provider

 

 2019  H70.92  Unspecified mastoiditis, left ear  Jonathan E. Cryer, MD

 

 2019  H70.92  Unspecified mastoiditis, left ear  Jonathan E. Cryer, MD







Plan of Treatment

Future Appointment(s):2020 11:00 am - Jonathan E. Cryer, MD at [place],
After  - Jonathan E. Cryer, MDH70.92 Unspecified mastoiditis, 
left ear



Functional Status







 Description

 

 No Information Available







Mental Status







 Description

 

 No Information Available







Referrals







 Description

 

 No Information Available Report received from Carlie ROBERTSON. Plan of care discussed. Patient resting comfortably in bed reports that she is hoping to go home. Safety precautions in place.
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
## 2021-05-12 NOTE — PROGRESS NOTES
Telemetry Shift Summary    Rhythm ST with first degree and BBB - Dr. Andrews notified.  HR Range 139-152  Ectopy rare PVCs  Measurements .22/.12/.34        Normal Values  Rhythm SR  HR Range    Measurements 0.12-0.20 / 0.06-0.10  / 0.30-0.52

## 2021-05-12 NOTE — CARE PLAN
The patient is Watcher - Medium risk of patient condition declining or worsening      Problem: Knowledge Deficit  Goal: Knowledge of disease process/condition, treatment plan, diagnostic tests, and medications will improve  Outcome: Progressing       Summary of progress made towards problems/goals:  Discussed plan of care with patient including IV antibiotic therapy, diet, medications administered, tele monitoring, as well as adenosine given per Cardiology orders. Allowed time for questions, patient agreed and verbalized understanding.     Problem: Respiratory:  Goal: Respiratory status will improve  Outcome: Not Progressing  Patient continues to complain of dyspnea at rest and exertion. Will continue with respiratory following and breathing treatments. Attempt to wean off as patient tolerates.

## 2021-05-12 NOTE — PROGRESS NOTES
Notified Dr. Andrews of patient continuing to have a regular HR of 140s, updated on BP of 117/36, recheck BP of 99/49. ECG ordered. Dr. Andrews at bedside and ECG reviewed. CARDS to be consulted.

## 2021-05-12 NOTE — CARE PLAN
Problem: Communication  Goal: The ability to communicate needs accurately and effectively will improve  Outcome: PROGRESSING AS EXPECTED  Note: Pt is able to fully express and communicate needs appropriately. POC discussed and pt verbalized understanding.         Problem: Safety  Goal: Will remain free from injury  Outcome: PROGRESSING AS EXPECTED  Note: Pt has remained free from falls and all safety measures are put in place.

## 2021-05-12 NOTE — FLOWSHEET NOTE
05/11/21 1932   Events/Summary/Plan   Events/Summary/Plan SVN Tx Given   Vital Signs   Pulse (!) 102   Respiration 16   Pulse Oximetry 93 %   $ Pulse Oximetry (Spot Check) Yes   O2 Alarms Set & Reviewed Yes   Respiratory Assessment   Respiratory Pattern Within Normal Limits   Level of Consciousness Alert   Chest Exam   Work Of Breathing / Effort Mild   Breath Sounds   RUL Breath Sounds Clear   RML Breath Sounds Clear   RLL Breath Sounds Diminished   TRISTAN Breath Sounds Clear   LLL Breath Sounds Diminished   Secretions   Cough Moist;Non Productive   How Sputum Obtained Cough on Request   Sputum Amount Unable to Evaluate   Sputum Color Unable to Evaluate   Sputum Consistency Unable to Evaluate   Oxygen   O2 (LPM) 2   O2 Delivery Device Silicone Nasal Cannula

## 2021-05-12 NOTE — FLOWSHEET NOTE
05/11/21 2247   Events/Summary/Plan   Events/Summary/Plan SVN Tx Given   Skin Inspection Respiratory Device Intact   Vital Signs   Pulse (!) 106   Respiration 18   Pulse Oximetry 94 %   $ Pulse Oximetry (Spot Check) Yes   O2 Alarms Set & Reviewed Yes   Respiratory Assessment   Respiratory Pattern Within Normal Limits   Level of Consciousness Alert   Chest Exam   Work Of Breathing / Effort Mild   Breath Sounds   RUL Breath Sounds Clear   RML Breath Sounds Clear   RLL Breath Sounds Diminished   TRISTAN Breath Sounds Clear   LLL Breath Sounds Diminished   Secretions   Cough Moist   How Sputum Obtained Spontaneous   Sputum Amount Unable to Evaluate   Sputum Color Unable to Evaluate   Sputum Consistency Unable to Evaluate   Oxygen   O2 (LPM) 2   O2 Delivery Device Silicone Nasal Cannula

## 2021-05-12 NOTE — PROGRESS NOTES
Monitor tech alerted This RN that the pt's HR had been sustaining between 120's-130's. Pt asymptomatic. MD Chin notified. No new orders placed.

## 2021-05-12 NOTE — ASSESSMENT & PLAN NOTE
- Abnormal cosyntropin stim test  - Started hydrocortisone BID - pt has an Endocrinologist that she follows with who manages her thyroid issues, will have her follow up with them upon discharge

## 2021-05-12 NOTE — PROGRESS NOTES
Report received from AILYN Foley and AILYN Samayoa. Plan of care discussed at patient's bedside. Whiteboard has been updated. Pt is resting comfortably in bed and declines any further needs at this time. Safety precautions in place and call light within reach.

## 2021-05-12 NOTE — PROGRESS NOTES
[facility] Medicine Daily Progress Note    Date of Service  5/12/2021    Chief Complaint  55 y.o. female admitted 5/10/2021 with SOB and lactic acidosis    Hospital Course  Donna Isaac is a 55 y.o. female who presents to the emergency department for evaluation of shortness of breath and leg swelling.  Stated symptoms started about 2 weeks ago with gradual progression. States with 10-20 feet ambulation she gets significantly short of breath. Also states she has been having symptoms getting herself dressed in the morning. Associated symptoms include leg swelling, cough and wheezing.   Also states she has been ill for some time with multiple chronic issues including chronic long-term antibiotics for polymicrobial sinusitis. She has been followed by [facility] infectious diseases and is currently on meropenem and Xarava for 5 weeks therapy.   Of note, she was recently in the ED for chest pain and shortness of breath, worked up and found to have a pulmonary embolism. She was started on Eliquis which she reports compliance.       In the ER, vital signs notable for hypertension. Labs with findings of elevated lactic acid. CT chest with no findings of acute PE or infiltrate/consolidation.       Interval Problem Update  5/11 - No clear etiology for pt's SOB or FLORES - will place on telemetry, EKG obtained has some sinus tach.  In reviewing old records, had low cortisol levels in the past - will give 10mg Decadron and check cosyntropin stim test. Was hypotensive in the ER but no longer hypotensive now. States she does still feel some SOB, is on 2L. Also states she has required O2 in the past after a bout of PNA.    5/12 - Pt with tachycardia into the 130s-140s today; EKG with some concern for PSVT/accelerated junctional rhythm. Adenosine 6mg then 12mg was administered, underlying rhythm with P waves, appears to be sinus rhythm.  Pt does not have s/s of sepsis or underlying infection. Discussed that she does have evidence of  adrenal insufficiency, she is willing to try hydrocortisone. Has some healing wounds but none appear infected.    Consultants/Specialty  Dr Alvarenga    Code Status  Full Code    Disposition  TBD     Review of Systems  Review of Systems   Constitutional: Negative for chills and fever.   Respiratory: Positive for cough, shortness of breath and wheezing (Pt affirms wheezing, none on exam).    Cardiovascular: Negative for chest pain, palpitations and leg swelling.   Gastrointestinal: Negative for abdominal pain, diarrhea, heartburn, nausea and vomiting.   All other systems reviewed and are negative.       Physical Exam  Temp:  [36.8 °C (98.2 °F)-37.1 °C (98.7 °F)] 36.9 °C (98.4 °F)  Pulse:  [] 136  Resp:  [8-18] 18  BP: (117-163)/() 132/82  SpO2:  [87 %-97 %] 90 %    Physical Exam  Vitals and nursing note reviewed.   Constitutional:       Appearance: Normal appearance. She is not toxic-appearing.   HENT:      Head: Normocephalic and atraumatic.      Mouth/Throat:      Mouth: Mucous membranes are moist.   Cardiovascular:      Rate and Rhythm: Regular rhythm. Tachycardia present.      Heart sounds: No murmur heard.     Pulmonary:      Effort: Pulmonary effort is normal. No respiratory distress.      Breath sounds: No wheezing, rhonchi or rales.      Comments: Diminished bilaterally without wheezing  Abdominal:      General: Abdomen is flat. Bowel sounds are normal.      Palpations: Abdomen is soft.   Musculoskeletal:         General: No swelling.   Skin:     General: Skin is warm and dry.      Comments: Skin survey - crack to L heel without surrounding erythema or purulence, two healing ulcers to the abdomen, no erythema or purulence   Neurological:      Mental Status: She is alert and oriented to person, place, and time.   Psychiatric:         Mood and Affect: Mood normal.         Behavior: Behavior normal.         Fluids    Intake/Output Summary (Last 24 hours) at 5/12/2021 6274  Last data filed at  5/12/2021 1316  Gross per 24 hour   Intake 360 ml   Output --   Net 360 ml       Laboratory  Recent Labs     05/10/21  1015 05/11/21  0309 05/12/21  0311   WBC 3.5* 7.7 13.8*   RBC 4.49 4.61 4.49   HEMOGLOBIN 13.7 14.1 13.4   HEMATOCRIT 43.0 44.0 41.9   MCV 95.8 95.4 93.3   MCH 30.5 30.6 29.8   MCHC 31.9* 32.0* 32.0*   RDW 57.4* 57.5* 56.9*   PLATELETCT 221 216 268   MPV 11.6 12.0 11.7     Recent Labs     05/10/21  1015 05/11/21  0309 05/12/21  0311   SODIUM 136 140 135   POTASSIUM 4.4 4.9 5.2   CHLORIDE 100 103 101   CO2 23 21 22   GLUCOSE 118* 144* 148*   BUN 16 12 15   CREATININE 1.27 1.13 0.93   CALCIUM 8.6 8.6 8.4                   Imaging  EC-ECHOCARDIOGRAM LTD W/O CONT   Final Result      CT-CTA CHEST PULMONARY ARTERY W/ RECONS   Final Result      1.  No CT evidence of pulmonary embolism. Left lower lobe pulmonary embolus seen on prior CT is not appreciated on the current exam.      2.  Mild pulmonary opacifications noted as described above. Atelectasis may be present. Mild edema or pneumonitis is also possible.      3.  Hepatic steatosis.            DX-CHEST-PORTABLE (1 VIEW)   Final Result      No acute cardiopulmonary findings.           Assessment/Plan  * FLORES (dyspnea on exertion)  Assessment & Plan  - Presenting with dyspnea on exertion with 10 to 20 feet of ambulation.  - Recently diagnosed with PTE in April, CTA this stay without evidence of PTE, continues on Eliquis - CT did show possible edema or pneumonitis, but pt does not appear volume overloaded   - had wheezing on exam per H&P on admit, home Prednisone was increased - no longer wheezing but may be cause of SOB   - Echo without evidence of recurrence of her cardiomyopathy, no peripheral edema on exam today  - Continue prednisone and breathing treatments   - Ambulatory O2 sat today  - Sputum culture pending  - CTA with sporadic opacifications but no overt PNA - if pt did have a component of PNA, Merrem and Xerava should cover     Adrenal  insufficiency ([place]'s disease) (HCC)  Assessment & Plan  - Abnormal cosyntropin stim test  - Will start hydrocortisone BID - pt has an Endocrinologist that she follows with who manages her thyroid issues, will have her follow up with them upon discharge     Sinus tachycardia  Assessment & Plan  - TSH and free T4 a little askew, will check T3 as she is on Synthroid and Cytomel   - Troponins ok  - Does not appear volume depleted on examination  - EKG with question of an accelerated junctional rhythm/PSVT - discussed with Dr Alvarenga, gave 6mg of adenosine, rate did not slow significantly. Administered 12mg and rate slowed with P waves present suggesting sinus component.   - Pt with normal procal, negative UA, no significant infiltrate on imaging, and is on appropriate antibiotics for her known MRSA/Pseudomonas sinusitis per [facility] ID. White count is up today, but likely due to steroid administration.  - Bps are improved today, will start CCB for her sinus tach (no BB at this time due to her underlying asthma)  - Blood cultures negative  - Recently had a PTE but no longer seen on imaging here   - Blood cultures negative  - Change Albuterol to Xopenex     Hx of migraines  Assessment & Plan  We will hold home migraine medications inpatient.    Pulmonary embolism (HCC)  Assessment & Plan  Diagnosed about 2 weeks ago.  Continue home Eliquis medication.  Repeat CTA chest here without any findings.    Lactic acidosis  Assessment & Plan  Resolved with IV hydration     Steroid dependence (HCC)- (present on admission)  Assessment & Plan  - On decadron chronically, pt unsure what for  - Notes from Vascular indicate it is for her periodic hypotension started by Endocrine    Acute sinusitis- (present on admission)  Assessment & Plan  - Pt was started on Merrem and Xerava on 4/20 by [facility]  - MRSA and Pseudomonas by culture per ID   - Continue Merrem and Xerava  - appreciate consult from [facility]    Hypertension-  (present on admission)  Assessment & Plan  Will hold home antihypertensive due to presenting symptoms of hypotension.  Closely monitor blood pressure.    Thyroid disease- (present on admission)  Assessment & Plan  Continue home thyroid supplementations. TSH low but free T4 also low, will check a T3 level as she is on T3 supplementation which can cause aberrancy in TSH and free T4 levels    Hypotension  Assessment & Plan  - Hypotension and tachycardia may be secondary to adrenal insufficiency --> procalcitonin is normal, UA benign, no infiltrate on chest imaging, skin survey without acute infection  - Rise in white count today likely secondary to decadron yesterday and continued prednisone         Class 1 obesity in adult- (present on admission)  Assessment & Plan  Weight loss and dietary counseling advised.    Fibromyalgia- (present on admission)  Assessment & Plan  Continue home meds        VTE prophylaxis: Eliquis

## 2021-05-12 NOTE — PROGRESS NOTES
Telemetry Shift Summary      Rhythm: ST   HR Range: 106-139  Ectopy: R PVC  Measurements: .12/.10/.30    Normal Values:  Rhythm: SR  HR Range:   Measurements: 0.12-0.20/ 0.06-0.10/ 0.30-0.52

## 2021-05-12 NOTE — FLOWSHEET NOTE
SVN not given. RN stated py was given Benadryl and wants to sleep.     05/12/21 0300   Therapy Not Performed   Type of Therapy Not Performed SVN  (Pt Asleep Per RN)
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
## 2021-05-12 NOTE — PROGRESS NOTES
Pt feeling nauseous and having dry heaves. HOB elevated further. See MAR. Pt refused oral medications until later time due to nausea. [facility]

## 2021-05-13 ENCOUNTER — APPOINTMENT (OUTPATIENT)
Dept: ONCOLOGY | Facility: MEDICAL CENTER | Age: 56
End: 2021-05-13
Attending: NURSE PRACTITIONER
Payer: MEDICARE

## 2021-05-13 ENCOUNTER — APPOINTMENT (OUTPATIENT)
Dept: RADIOLOGY | Facility: MEDICAL CENTER | Age: 56
DRG: 202 | End: 2021-05-13
Attending: FAMILY MEDICINE
Payer: MEDICARE

## 2021-05-13 PROBLEM — E87.79 CARDIAC VOLUME OVERLOAD: Status: ACTIVE | Noted: 2021-05-13

## 2021-05-13 LAB
ALBUMIN SERPL BCP-MCNC: 2.5 G/DL (ref 3.2–4.9)
ALBUMIN/GLOB SERPL: 1 G/DL
ALP SERPL-CCNC: 139 U/L (ref 30–99)
ALT SERPL-CCNC: 45 U/L (ref 2–50)
ANION GAP SERPL CALC-SCNC: 11 MMOL/L (ref 7–16)
AST SERPL-CCNC: 45 U/L (ref 12–45)
BASOPHILS # BLD AUTO: 0.2 % (ref 0–1.8)
BASOPHILS # BLD: 0.02 K/UL (ref 0–0.12)
BILIRUB SERPL-MCNC: 0.6 MG/DL (ref 0.1–1.5)
BUN SERPL-MCNC: 23 MG/DL (ref 8–22)
CALCIUM SERPL-MCNC: 7.9 MG/DL (ref 8.4–10.2)
CHLORIDE SERPL-SCNC: 104 MMOL/L (ref 96–112)
CO2 SERPL-SCNC: 22 MMOL/L (ref 20–33)
CREAT SERPL-MCNC: 1.18 MG/DL (ref 0.5–1.4)
EOSINOPHIL # BLD AUTO: 0.01 K/UL (ref 0–0.51)
EOSINOPHIL NFR BLD: 0.1 % (ref 0–6.9)
ERYTHROCYTE [DISTWIDTH] IN BLOOD BY AUTOMATED COUNT: 59.1 FL (ref 35.9–50)
GLOBULIN SER CALC-MCNC: 2.4 G/DL (ref 1.9–3.5)
GLUCOSE BLD-MCNC: 117 MG/DL (ref 65–99)
GLUCOSE BLD-MCNC: 134 MG/DL (ref 65–99)
GLUCOSE BLD-MCNC: 135 MG/DL (ref 65–99)
GLUCOSE BLD-MCNC: 177 MG/DL (ref 65–99)
GLUCOSE SERPL-MCNC: 138 MG/DL (ref 65–99)
HCT VFR BLD AUTO: 41.8 % (ref 37–47)
HGB BLD-MCNC: 13.1 G/DL (ref 12–16)
IMM GRANULOCYTES # BLD AUTO: 0.11 K/UL (ref 0–0.11)
IMM GRANULOCYTES NFR BLD AUTO: 0.9 % (ref 0–0.9)
LACTATE BLD-SCNC: 1.9 MMOL/L (ref 0.5–2)
LACTATE BLD-SCNC: 2.1 MMOL/L (ref 0.5–2)
LACTATE BLD-SCNC: 2.2 MMOL/L (ref 0.5–2)
LACTATE BLD-SCNC: 2.6 MMOL/L (ref 0.5–2)
LYMPHOCYTES # BLD AUTO: 1.64 K/UL (ref 1–4.8)
LYMPHOCYTES NFR BLD: 12.9 % (ref 22–41)
MAGNESIUM SERPL-MCNC: 1.9 MG/DL (ref 1.5–2.5)
MCH RBC QN AUTO: 30.3 PG (ref 27–33)
MCHC RBC AUTO-ENTMCNC: 31.3 G/DL (ref 33.6–35)
MCV RBC AUTO: 96.5 FL (ref 81.4–97.8)
MONOCYTES # BLD AUTO: 1.41 K/UL (ref 0–0.85)
MONOCYTES NFR BLD AUTO: 11.1 % (ref 0–13.4)
NEUTROPHILS # BLD AUTO: 9.53 K/UL (ref 2–7.15)
NEUTROPHILS NFR BLD: 74.8 % (ref 44–72)
NRBC # BLD AUTO: 0 K/UL
NRBC BLD-RTO: 0 /100 WBC
PHOSPHATE SERPL-MCNC: 3.8 MG/DL (ref 2.5–4.5)
PLATELET # BLD AUTO: 271 K/UL (ref 164–446)
PMV BLD AUTO: 11.7 FL (ref 9–12.9)
POTASSIUM SERPL-SCNC: 5.5 MMOL/L (ref 3.6–5.5)
PROCALCITONIN SERPL-MCNC: 0.22 NG/ML
PROT SERPL-MCNC: 4.9 G/DL (ref 6–8.2)
RBC # BLD AUTO: 4.33 M/UL (ref 4.2–5.4)
SODIUM SERPL-SCNC: 137 MMOL/L (ref 135–145)
WBC # BLD AUTO: 12.7 K/UL (ref 4.8–10.8)

## 2021-05-13 PROCEDURE — 99233 SBSQ HOSP IP/OBS HIGH 50: CPT | Performed by: INTERNAL MEDICINE

## 2021-05-13 PROCEDURE — 700102 HCHG RX REV CODE 250 W/ 637 OVERRIDE(OP): Performed by: STUDENT IN AN ORGANIZED HEALTH CARE EDUCATION/TRAINING PROGRAM

## 2021-05-13 PROCEDURE — 94640 AIRWAY INHALATION TREATMENT: CPT

## 2021-05-13 PROCEDURE — 85025 COMPLETE CBC W/AUTO DIFF WBC: CPT

## 2021-05-13 PROCEDURE — 71045 X-RAY EXAM CHEST 1 VIEW: CPT

## 2021-05-13 PROCEDURE — 83735 ASSAY OF MAGNESIUM: CPT

## 2021-05-13 PROCEDURE — 700111 HCHG RX REV CODE 636 W/ 250 OVERRIDE (IP): Performed by: STUDENT IN AN ORGANIZED HEALTH CARE EDUCATION/TRAINING PROGRAM

## 2021-05-13 PROCEDURE — 99233 SBSQ HOSP IP/OBS HIGH 50: CPT | Performed by: FAMILY MEDICINE

## 2021-05-13 PROCEDURE — 770020 HCHG ROOM/CARE - TELE (206)

## 2021-05-13 PROCEDURE — 700102 HCHG RX REV CODE 250 W/ 637 OVERRIDE(OP): Performed by: FAMILY MEDICINE

## 2021-05-13 PROCEDURE — 700111 HCHG RX REV CODE 636 W/ 250 OVERRIDE (IP): Performed by: FAMILY MEDICINE

## 2021-05-13 PROCEDURE — A9270 NON-COVERED ITEM OR SERVICE: HCPCS | Performed by: FAMILY MEDICINE

## 2021-05-13 PROCEDURE — 83605 ASSAY OF LACTIC ACID: CPT

## 2021-05-13 PROCEDURE — 84145 PROCALCITONIN (PCT): CPT

## 2021-05-13 PROCEDURE — 84100 ASSAY OF PHOSPHORUS: CPT

## 2021-05-13 PROCEDURE — 700101 HCHG RX REV CODE 250: Performed by: STUDENT IN AN ORGANIZED HEALTH CARE EDUCATION/TRAINING PROGRAM

## 2021-05-13 PROCEDURE — 80053 COMPREHEN METABOLIC PANEL: CPT

## 2021-05-13 PROCEDURE — 82962 GLUCOSE BLOOD TEST: CPT | Mod: 91

## 2021-05-13 PROCEDURE — 700105 HCHG RX REV CODE 258: Performed by: STUDENT IN AN ORGANIZED HEALTH CARE EDUCATION/TRAINING PROGRAM

## 2021-05-13 PROCEDURE — A9270 NON-COVERED ITEM OR SERVICE: HCPCS | Performed by: STUDENT IN AN ORGANIZED HEALTH CARE EDUCATION/TRAINING PROGRAM

## 2021-05-13 RX ORDER — FUROSEMIDE 10 MG/ML
40 INJECTION INTRAMUSCULAR; INTRAVENOUS
Status: DISCONTINUED | OUTPATIENT
Start: 2021-05-13 | End: 2021-05-15 | Stop reason: HOSPADM

## 2021-05-13 RX ORDER — CARVEDILOL 6.25 MG/1
12.5 TABLET ORAL 2 TIMES DAILY WITH MEALS
Status: DISCONTINUED | OUTPATIENT
Start: 2021-05-13 | End: 2021-05-15 | Stop reason: HOSPADM

## 2021-05-13 RX ADMIN — APIXABAN 5 MG: 5 TABLET, FILM COATED ORAL at 17:25

## 2021-05-13 RX ADMIN — GABAPENTIN 400 MG: 400 CAPSULE ORAL at 11:13

## 2021-05-13 RX ADMIN — Medication 5 MG: at 21:31

## 2021-05-13 RX ADMIN — MEROPENEM 1 G: 1 INJECTION, POWDER, FOR SOLUTION INTRAVENOUS at 13:29

## 2021-05-13 RX ADMIN — APIXABAN 5 MG: 5 TABLET, FILM COATED ORAL at 05:51

## 2021-05-13 RX ADMIN — CARVEDILOL 12.5 MG: 6.25 TABLET, FILM COATED ORAL at 17:26

## 2021-05-13 RX ADMIN — LIOTHYRONINE SODIUM 25 MCG: 5 TABLET ORAL at 21:30

## 2021-05-13 RX ADMIN — MEROPENEM 1 G: 1 INJECTION, POWDER, FOR SOLUTION INTRAVENOUS at 05:52

## 2021-05-13 RX ADMIN — CARVEDILOL 12.5 MG: 6.25 TABLET, FILM COATED ORAL at 08:43

## 2021-05-13 RX ADMIN — INSULIN HUMAN 1 UNITS: 100 INJECTION, SOLUTION PARENTERAL at 17:29

## 2021-05-13 RX ADMIN — CETIRIZINE HYDROCHLORIDE 20 MG: 10 TABLET, FILM COATED ORAL at 21:30

## 2021-05-13 RX ADMIN — MONTELUKAST 10 MG: 10 TABLET, FILM COATED ORAL at 17:27

## 2021-05-13 RX ADMIN — COLESEVELAM HYDROCHLORIDE 625 MG: 625 TABLET, FILM COATED ORAL at 11:13

## 2021-05-13 RX ADMIN — HYDROCORTISONE 10 MG: 10 TABLET ORAL at 05:51

## 2021-05-13 RX ADMIN — IPRATROPIUM BROMIDE AND ALBUTEROL SULFATE 3 ML: .5; 2.5 SOLUTION RESPIRATORY (INHALATION) at 08:00

## 2021-05-13 RX ADMIN — CETIRIZINE HYDROCHLORIDE 20 MG: 10 TABLET, FILM COATED ORAL at 08:44

## 2021-05-13 RX ADMIN — FUROSEMIDE 40 MG: 10 INJECTION, SOLUTION INTRAMUSCULAR; INTRAVENOUS at 14:58

## 2021-05-13 RX ADMIN — GABAPENTIN 400 MG: 400 CAPSULE ORAL at 17:26

## 2021-05-13 RX ADMIN — PREDNISONE 40 MG: 20 TABLET ORAL at 05:51

## 2021-05-13 RX ADMIN — HYDROCORTISONE 5 MG: 10 TABLET ORAL at 13:28

## 2021-05-13 RX ADMIN — AMITRIPTYLINE HYDROCHLORIDE 10 MG: 10 TABLET, FILM COATED ORAL at 05:51

## 2021-05-13 RX ADMIN — GABAPENTIN 400 MG: 400 CAPSULE ORAL at 05:51

## 2021-05-13 RX ADMIN — LEVOTHYROXINE SODIUM 75 MCG: 0.07 TABLET ORAL at 17:26

## 2021-05-13 RX ADMIN — MEROPENEM 1 G: 1 INJECTION, POWDER, FOR SOLUTION INTRAVENOUS at 21:29

## 2021-05-13 RX ADMIN — COLESEVELAM HYDROCHLORIDE 625 MG: 625 TABLET, FILM COATED ORAL at 08:43

## 2021-05-13 RX ADMIN — ONDANSETRON 4 MG: 4 TABLET, ORALLY DISINTEGRATING ORAL at 02:57

## 2021-05-13 RX ADMIN — AMITRIPTYLINE HYDROCHLORIDE 10 MG: 10 TABLET, FILM COATED ORAL at 17:26

## 2021-05-13 RX ADMIN — COLESEVELAM HYDROCHLORIDE 625 MG: 625 TABLET, FILM COATED ORAL at 17:27

## 2021-05-13 RX ADMIN — DULOXETINE HYDROCHLORIDE 60 MG: 30 CAPSULE, DELAYED RELEASE ORAL at 21:31

## 2021-05-13 ASSESSMENT — ENCOUNTER SYMPTOMS
ABDOMINAL PAIN: 0
FEVER: 0
PALPITATIONS: 0
SHORTNESS OF BREATH: 1
DIARRHEA: 0
VOMITING: 0
NAUSEA: 0
HEARTBURN: 0
WHEEZING: 1
COUGH: 1
CHILLS: 0

## 2021-05-13 ASSESSMENT — PAIN DESCRIPTION - PAIN TYPE: TYPE: CHRONIC PAIN

## 2021-05-13 NOTE — PROGRESS NOTES
[facility] Medicine Daily Progress Note    Date of Service  5/13/2021    Chief Complaint  55 y.o. female admitted 5/10/2021 with SOB and lactic acidosis    Hospital Course  Donna Isaac is a 55 y.o. female who presents to the emergency department for evaluation of shortness of breath and leg swelling.  Stated symptoms started about 2 weeks ago with gradual progression. States with 10-20 feet ambulation she gets significantly short of breath. Also states she has been having symptoms getting herself dressed in the morning. Associated symptoms include leg swelling, cough and wheezing.   Also states she has been ill for some time with multiple chronic issues including chronic long-term antibiotics for polymicrobial sinusitis. She has been followed by [facility] infectious diseases and is currently on meropenem and Xarava for 5 weeks therapy.   Of note, she was recently in the ED for chest pain and shortness of breath, worked up and found to have a pulmonary embolism. She was started on Eliquis which she reports compliance.       In the ER, vital signs notable for hypertension. Labs with findings of elevated lactic acid. CT chest with no findings of acute PE or infiltrate/consolidation.       Interval Problem Update  5/11 - No clear etiology for pt's SOB or FLORES - will place on telemetry, EKG obtained has some sinus tach.  In reviewing old records, had low cortisol levels in the past - will give 10mg Decadron and check cosyntropin stim test. Was hypotensive in the ER but no longer hypotensive now. States she does still feel some SOB, is on 2L. Also states she has required O2 in the past after a bout of PNA.    5/12 - Pt with tachycardia into the 130s-140s today; EKG with some concern for PSVT/accelerated junctional rhythm. Adenosine 6mg then 12mg was administered, underlying rhythm with P waves, appears to be sinus rhythm.  Pt does not have s/s of sepsis or underlying infection. Discussed that she does have evidence of  adrenal insufficiency, she is willing to try hydrocortisone. Has some healing wounds but none appear infected.    5/13 - Pt with drop in blood pressure after administration of her home Coreg last evening, required a 500cc NS bolus to recover and had an associated lactic acidosis.  WBC remains stable and procalcitonin is normal; however, now has pulmonary edema. Start BID Lasix.     Consultants/Specialty  Dr Alvarenga    Code Status  Full Code    Disposition  TBD     Review of Systems  Review of Systems   Constitutional: Negative for chills and fever.   Respiratory: Positive for cough, shortness of breath and wheezing (Pt affirms wheezing, none on exam).    Cardiovascular: Negative for chest pain, palpitations and leg swelling.   Gastrointestinal: Negative for abdominal pain, diarrhea, heartburn, nausea and vomiting.   All other systems reviewed and are negative.       Physical Exam  Temp:  [36.4 °C (97.5 °F)-36.9 °C (98.4 °F)] 36.4 °C (97.5 °F)  Pulse:  [108-140] 108  Resp:  [18-20] 20  BP: ()/(36-96) 125/94  SpO2:  [90 %-92 %] 92 %    Physical Exam  Vitals and nursing note reviewed.   Constitutional:       Appearance: Normal appearance. She is not toxic-appearing.   HENT:      Head: Normocephalic and atraumatic.      Mouth/Throat:      Mouth: Mucous membranes are moist.   Cardiovascular:      Rate and Rhythm: Regular rhythm. Tachycardia present.      Heart sounds: No murmur heard.     Pulmonary:      Effort: Pulmonary effort is normal. No respiratory distress.      Breath sounds: No wheezing, rhonchi or rales.      Comments: Diminished bilaterally without wheezing  Abdominal:      General: Abdomen is flat. Bowel sounds are normal.      Palpations: Abdomen is soft.   Musculoskeletal:         General: No swelling.   Skin:     General: Skin is warm and dry.      Comments: Skin survey - crack to L heel without surrounding erythema or purulence, two healing ulcers to the abdomen, no erythema or purulence    Neurological:      Mental Status: She is alert and oriented to person, place, and time.   Psychiatric:         Mood and Affect: Mood normal.         Behavior: Behavior normal.         Fluids    Intake/Output Summary (Last 24 hours) at 5/13/2021 0715  Last data filed at 5/12/2021 1316  Gross per 24 hour   Intake 360 ml   Output --   Net 360 ml       Laboratory  Recent Labs     05/11/21  0309 05/12/21  0311 05/13/21  0326   WBC 7.7 13.8* 12.7*   RBC 4.61 4.49 4.33   HEMOGLOBIN 14.1 13.4 13.1   HEMATOCRIT 44.0 41.9 41.8   MCV 95.4 93.3 96.5   MCH 30.6 29.8 30.3   MCHC 32.0* 32.0* 31.3*   RDW 57.5* 56.9* 59.1*   PLATELETCT 216 268 271   MPV 12.0 11.7 11.7     Recent Labs     05/11/21  0309 05/12/21  0311 05/13/21  0326   SODIUM 140 135 137   POTASSIUM 4.9 5.2 5.5   CHLORIDE 103 101 104   CO2 21 22 22   GLUCOSE 144* 148* 138*   BUN 12 15 23*   CREATININE 1.13 0.93 1.18   CALCIUM 8.6 8.4 7.9*                   Imaging  EC-ECHOCARDIOGRAM LTD W/O CONT   Final Result      CT-CTA CHEST PULMONARY ARTERY W/ RECONS   Final Result      1.  No CT evidence of pulmonary embolism. Left lower lobe pulmonary embolus seen on prior CT is not appreciated on the current exam.      2.  Mild pulmonary opacifications noted as described above. Atelectasis may be present. Mild edema or pneumonitis is also possible.      3.  Hepatic steatosis.            DX-CHEST-PORTABLE (1 VIEW)   Final Result      No acute cardiopulmonary findings.      DX-CHEST-PORTABLE (1 VIEW)    (Results Pending)        Assessment/Plan  * FLORES (dyspnea on exertion)  Assessment & Plan  - Presenting with dyspnea on exertion with 10 to 20 feet of ambulation.  - Recently diagnosed with PTE in April, CTA this stay without evidence of PTE, continues on Eliquis - CT did show possible edema or pneumonitis, but pt does not appear volume overloaded   - had wheezing on exam per H&P on admit, Prednisone given - no longer wheezing but may be cause of SOB   - Echo without evidence of  recurrence of her cardiomyopathy, but does have peripheral edema and rales - diuresing  - Continue breathing treatments, no wheezing on exam   - Ambulatory O2 sat performed, pt did not drop below 91%  - Sputum culture pending  - CTA with sporadic opacifications but no overt PNA - if pt did have a component of PNA, Merrem and Xerava should cover     Cardiac volume overload  Assessment & Plan  - Cardiac and iatrogenic in nature - became hypotensive last evening after receiving her home dose of Coreg, was given 500cc NS bolus  - New pulmonary edema on CXR this am - will start BID diuresis and monitor lactic acids  - Likely with some diastolic dysfunction    Adrenal insufficiency (Mingo's disease) ([facility])  Assessment & Plan  - Abnormal cosyntropin stim test  - Started hydrocortisone BID - pt has an Endocrinologist that she follows with who manages her thyroid issues, will have her follow up with them upon discharge     Inappropriate sinus tachycardia  Assessment & Plan  - TSH and free T4 a little askew, but free T3 is normal - as T4 is slightly low, very unlikely this would contribute to her tachycardia   - EKG with question of an accelerated junctional rhythm/PSVT - discussed with Dr Alvarenga, gave 6mg of adenosine, rate did not slow significantly. Administered 12mg and rate slowed with P waves present suggesting sinus component.   - Pt with normal procal, negative UA, no significant infiltrate on imaging, and is on appropriate antibiotics for her known MRSA/Pseudomonas sinusitis per [facility] ID. White count is up today, but likely due to steroid administration.  - Bps dropped with resumption of her home Coreg, will decrease dose to 12.5mg BID   - Blood cultures negative  - Recently had a PTE but no longer seen on imaging here   - Changed Albuterol to Xopenex     Hx of migraines  Assessment & Plan  We will hold home migraine medications inpatient.    Pulmonary embolism (HCC)  Assessment & Plan  Diagnosed about 2  weeks ago.  Continue home Eliquis medication.  Repeat CTA chest here evidence of PTE.    Lactic acidosis  Assessment & Plan  Secondary to hypotension - will recheck, secondary to hypotension moreso than dehydration    Steroid dependence (HCC)- (present on admission)  Assessment & Plan  - On decadron chronically, pt unsure what for  - Notes from Vascular indicate it is for her periodic hypotension started by Endocrine    Acute sinusitis- (present on admission)  Assessment & Plan  - Pt was started on Merrem and Xerava on 4/20 by Lynnette GERMAN  - MRSA and Pseudomonas by culture per ID   - Continue Merrem and Xerava  - appreciate consult from Lynnette ID    Hypertension- (present on admission)  Assessment & Plan  -Pt on Losartan, Coreg, and Cardura at home, having labile Bps here; will give 12.5 Coreg BID today for her tachycardia and monitor BPs  -Pt follows with Dr Bloch as an outpatient for BP management; his notes indicate 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 TSH low but free T4 also low,T3 level is normal; will have pt follow up with her Endocrinologist as an outpatient     Hypotension  Assessment & Plan  - Procalcitonin is normal, UA benign, no infiltrate on chest imaging, skin survey without acute infection  - Hypotension may have contribution from adrenal insufficiency, hydrocortisone started  - Rise in white count  likely secondary to decadron and continued prednisone, procalcitonin remains normal, pt without fever  - Pt became hypotensive 5/12 after receiving home Coreg for tachycardia - will reduce dose to 12.5mg today and monitor         Class 1 obesity in adult- (present on admission)  Assessment & Plan  Weight loss and dietary counseling advised.    Fibromyalgia- (present on admission)  Assessment & Plan  Continue home meds        VTE  prophylaxis: Eliquis

## 2021-05-13 NOTE — PROGRESS NOTES
Report received from AILYN Kent. Plan of care discussed at patient's bedside. Whiteboard has been updated. Pt is resting comfortably in bed and declines any further needs at this time. Safety precautions in place and call light within reach.

## 2021-05-13 NOTE — PROGRESS NOTES
Telemetry Shift Summary      Rhythm: ST w/ BBB  HR Range: 101-135  Ectopy: R PVC, R Trip  Measurements: .16/.14/.38    Normal Values:  Rhythm: SR  HR Range:   Measurements: 0.12-0.20/ 0.06-0.10/ 0.30-0.52

## 2021-05-13 NOTE — DISCHARGE PLANNING
Anticipated Discharge Disposition: home    Action: Received a call from Dank ([phone], ext [phone]), pharmacist with [facility], wanting clarification on whether we were using patient's own supply of Eravacycline in the hospital, or if we were using our own.  Per orders, we are using patient supply.  Dank requested we find out how many doses she has left as she should run out today.  Voalte message sent to BS RN.  She stated there are 7 doses left.  Spoke with Kingsley, clinical pharmacist, and he stated that we would have to get from [facility] if she runs out while in house.      Returned call to Alex to let him know that there are 7 doses left.  He stated that he had checked with his supervisor and they are not able to provide a new supply to the patient while she is in the hospital, so the hospital would need to provide the medication for patient if she is still here when she runs out.  Per Dr. Andrews in IDT rounds, patient will hopefully dc tomorrow.      Voalte message sent to Dr. Andrews and Kingsley to let them know Eravacycline will not be provided by [facility] while patient in house.    Barriers to Discharge: medical clearance    Plan: RN CM to follow up for medical clearance and to keep [facility] pharmacist up to date on plan for patient.
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
## 2021-05-13 NOTE — PROGRESS NOTES
"2112: Pt's was hypotensive and ranging from mid 80's-low 90's systolic consistently. After repositioning and attempting to get a pressure from other sites the highest reading was 110/77. Pt stated that she was feeling \"tired\" but was otherwise asymptomatic.     2135: MD Courtney notified of pt's blood pressure. New orders placed.     2200: MD Chin notified of pt's condition and new orders placed by MD Courtney. Additional orders placed.      "

## 2021-05-13 NOTE — ASSESSMENT & PLAN NOTE
- Cardiac and iatrogenic in nature - became hypotensive after receiving her home dose of Coreg, was given 500cc NS bolus  - New pulmonary edema on CXR after bolus - continue BID diuresis and monitor lactic acids  - Likely with some diastolic dysfunction  - Continue BB, cannot add Losartan or Aldacatone due to BPs

## 2021-05-13 NOTE — CARE PLAN
"  Problem: Respiratory:  Goal: Respiratory status will improve  Outcome: Not Progressing   The patient is Watcher - Medium risk of patient condition declining or worsening    Shift Goals  Clinical Goals: Manage tachycardia and hypotension. Continue to monitor breathing    Patient Goals: Breathe better    Summary of progress made towards problems/goals:  Pt assessed for hypotension. Orthostatic blood pressures measured and were positive. Pt was further treated for hypotension with 500 ml bolus that improved pt's blood pressure to an appropriate range.       Problem: Respiratory:  Goal: Respiratory status will improve  Outcome: Not Progressing   Pt was monitored closely by This RN and RT to ensure that adequate oxygenation occurred. Increased expiratory wheezes were noted and further identified to be transmitted upper airway sounds. Pt's O2 saturation remained stable overnight at 2L via nasal cannula. Pt stated they felt \"much better\" after nebulizer treatments provided by RT.  "

## 2021-05-13 NOTE — CARE PLAN
Problem: Safety  Goal: Will remain free from falls  Outcome: Progressing  Note: Pt calls appropriately for assistance. Non-skid socks on.     Problem: Bowel/Gastric:  Goal: Normal bowel function is maintained or improved  Outcome: Progressing  Flowsheets (Taken 5/13/2021 0800 by So Jin)  Last BM: 05/12/21

## 2021-05-14 ENCOUNTER — APPOINTMENT (OUTPATIENT)
Dept: RADIOLOGY | Facility: MEDICAL CENTER | Age: 56
DRG: 202 | End: 2021-05-14
Attending: FAMILY MEDICINE
Payer: MEDICARE

## 2021-05-14 LAB
ALBUMIN SERPL BCP-MCNC: 2.4 G/DL (ref 3.2–4.9)
ALBUMIN/GLOB SERPL: 1 G/DL
ALP SERPL-CCNC: 129 U/L (ref 30–99)
ALT SERPL-CCNC: 40 U/L (ref 2–50)
ANION GAP SERPL CALC-SCNC: 9 MMOL/L (ref 7–16)
AST SERPL-CCNC: 38 U/L (ref 12–45)
BACTERIA SPEC RESP CULT: NORMAL
BACTERIA UR CULT: ABNORMAL
BASOPHILS # BLD AUTO: 0.1 % (ref 0–1.8)
BASOPHILS # BLD: 0.01 K/UL (ref 0–0.12)
BILIRUB SERPL-MCNC: 0.6 MG/DL (ref 0.1–1.5)
BUN SERPL-MCNC: 28 MG/DL (ref 8–22)
CALCIUM SERPL-MCNC: 7.9 MG/DL (ref 8.4–10.2)
CHLORIDE SERPL-SCNC: 101 MMOL/L (ref 96–112)
CO2 SERPL-SCNC: 25 MMOL/L (ref 20–33)
CREAT SERPL-MCNC: 0.98 MG/DL (ref 0.5–1.4)
EOSINOPHIL # BLD AUTO: 0.01 K/UL (ref 0–0.51)
EOSINOPHIL NFR BLD: 0.1 % (ref 0–6.9)
ERYTHROCYTE [DISTWIDTH] IN BLOOD BY AUTOMATED COUNT: 55.4 FL (ref 35.9–50)
GLOBULIN SER CALC-MCNC: 2.4 G/DL (ref 1.9–3.5)
GLUCOSE BLD-MCNC: 101 MG/DL (ref 65–99)
GLUCOSE BLD-MCNC: 114 MG/DL (ref 65–99)
GLUCOSE BLD-MCNC: 116 MG/DL (ref 65–99)
GLUCOSE BLD-MCNC: 177 MG/DL (ref 65–99)
GLUCOSE SERPL-MCNC: 125 MG/DL (ref 65–99)
GRAM STN SPEC: NORMAL
HCT VFR BLD AUTO: 39.2 % (ref 37–47)
HGB BLD-MCNC: 12.5 G/DL (ref 12–16)
IMM GRANULOCYTES # BLD AUTO: 0.04 K/UL (ref 0–0.11)
IMM GRANULOCYTES NFR BLD AUTO: 0.5 % (ref 0–0.9)
LYMPHOCYTES # BLD AUTO: 1.84 K/UL (ref 1–4.8)
LYMPHOCYTES NFR BLD: 23.7 % (ref 22–41)
MCH RBC QN AUTO: 30 PG (ref 27–33)
MCHC RBC AUTO-ENTMCNC: 31.9 G/DL (ref 33.6–35)
MCV RBC AUTO: 94 FL (ref 81.4–97.8)
MONOCYTES # BLD AUTO: 1.04 K/UL (ref 0–0.85)
MONOCYTES NFR BLD AUTO: 13.4 % (ref 0–13.4)
NEUTROPHILS # BLD AUTO: 4.82 K/UL (ref 2–7.15)
NEUTROPHILS NFR BLD: 62.2 % (ref 44–72)
NRBC # BLD AUTO: 0 K/UL
NRBC BLD-RTO: 0 /100 WBC
NT-PROBNP SERPL IA-MCNC: ABNORMAL PG/ML (ref 0–125)
PLATELET # BLD AUTO: 264 K/UL (ref 164–446)
PMV BLD AUTO: 11.5 FL (ref 9–12.9)
POTASSIUM SERPL-SCNC: 4.9 MMOL/L (ref 3.6–5.5)
PROT SERPL-MCNC: 4.8 G/DL (ref 6–8.2)
RBC # BLD AUTO: 4.17 M/UL (ref 4.2–5.4)
SIGNIFICANT IND 70042: ABNORMAL
SIGNIFICANT IND 70042: NORMAL
SITE SITE: ABNORMAL
SITE SITE: NORMAL
SODIUM SERPL-SCNC: 135 MMOL/L (ref 135–145)
SOURCE SOURCE: ABNORMAL
SOURCE SOURCE: NORMAL
WBC # BLD AUTO: 7.8 K/UL (ref 4.8–10.8)

## 2021-05-14 PROCEDURE — 83880 ASSAY OF NATRIURETIC PEPTIDE: CPT

## 2021-05-14 PROCEDURE — A9270 NON-COVERED ITEM OR SERVICE: HCPCS | Performed by: FAMILY MEDICINE

## 2021-05-14 PROCEDURE — 700102 HCHG RX REV CODE 250 W/ 637 OVERRIDE(OP): Performed by: FAMILY MEDICINE

## 2021-05-14 PROCEDURE — 700111 HCHG RX REV CODE 636 W/ 250 OVERRIDE (IP): Performed by: STUDENT IN AN ORGANIZED HEALTH CARE EDUCATION/TRAINING PROGRAM

## 2021-05-14 PROCEDURE — 99233 SBSQ HOSP IP/OBS HIGH 50: CPT | Performed by: INTERNAL MEDICINE

## 2021-05-14 PROCEDURE — 71046 X-RAY EXAM CHEST 2 VIEWS: CPT

## 2021-05-14 PROCEDURE — A9270 NON-COVERED ITEM OR SERVICE: HCPCS | Performed by: STUDENT IN AN ORGANIZED HEALTH CARE EDUCATION/TRAINING PROGRAM

## 2021-05-14 PROCEDURE — 700105 HCHG RX REV CODE 258: Performed by: STUDENT IN AN ORGANIZED HEALTH CARE EDUCATION/TRAINING PROGRAM

## 2021-05-14 PROCEDURE — 99232 SBSQ HOSP IP/OBS MODERATE 35: CPT | Performed by: FAMILY MEDICINE

## 2021-05-14 PROCEDURE — 82962 GLUCOSE BLOOD TEST: CPT | Mod: 91

## 2021-05-14 PROCEDURE — 700111 HCHG RX REV CODE 636 W/ 250 OVERRIDE (IP): Performed by: FAMILY MEDICINE

## 2021-05-14 PROCEDURE — 80053 COMPREHEN METABOLIC PANEL: CPT

## 2021-05-14 PROCEDURE — 770020 HCHG ROOM/CARE - TELE (206)

## 2021-05-14 PROCEDURE — 85025 COMPLETE CBC W/AUTO DIFF WBC: CPT

## 2021-05-14 PROCEDURE — 700102 HCHG RX REV CODE 250 W/ 637 OVERRIDE(OP): Performed by: STUDENT IN AN ORGANIZED HEALTH CARE EDUCATION/TRAINING PROGRAM

## 2021-05-14 RX ADMIN — COLESEVELAM HYDROCHLORIDE 625 MG: 625 TABLET, FILM COATED ORAL at 08:33

## 2021-05-14 RX ADMIN — HYDROCORTISONE 5 MG: 10 TABLET ORAL at 13:45

## 2021-05-14 RX ADMIN — MEROPENEM 1 G: 1 INJECTION, POWDER, FOR SOLUTION INTRAVENOUS at 13:45

## 2021-05-14 RX ADMIN — CETIRIZINE HYDROCHLORIDE 20 MG: 10 TABLET, FILM COATED ORAL at 08:33

## 2021-05-14 RX ADMIN — GABAPENTIN 400 MG: 400 CAPSULE ORAL at 11:31

## 2021-05-14 RX ADMIN — GABAPENTIN 400 MG: 400 CAPSULE ORAL at 17:07

## 2021-05-14 RX ADMIN — LIOTHYRONINE SODIUM 25 MCG: 5 TABLET ORAL at 21:37

## 2021-05-14 RX ADMIN — COLESEVELAM HYDROCHLORIDE 625 MG: 625 TABLET, FILM COATED ORAL at 17:06

## 2021-05-14 RX ADMIN — MEROPENEM 1 G: 1 INJECTION, POWDER, FOR SOLUTION INTRAVENOUS at 21:37

## 2021-05-14 RX ADMIN — APIXABAN 5 MG: 5 TABLET, FILM COATED ORAL at 06:07

## 2021-05-14 RX ADMIN — CARVEDILOL 12.5 MG: 6.25 TABLET, FILM COATED ORAL at 08:33

## 2021-05-14 RX ADMIN — MONTELUKAST 10 MG: 10 TABLET, FILM COATED ORAL at 17:06

## 2021-05-14 RX ADMIN — INSULIN HUMAN 1 UNITS: 100 INJECTION, SOLUTION PARENTERAL at 21:39

## 2021-05-14 RX ADMIN — AMITRIPTYLINE HYDROCHLORIDE 10 MG: 10 TABLET, FILM COATED ORAL at 17:06

## 2021-05-14 RX ADMIN — LEVOTHYROXINE SODIUM 75 MCG: 0.07 TABLET ORAL at 17:07

## 2021-05-14 RX ADMIN — FUROSEMIDE 40 MG: 10 INJECTION, SOLUTION INTRAMUSCULAR; INTRAVENOUS at 06:07

## 2021-05-14 RX ADMIN — ACETAMINOPHEN 650 MG: 325 TABLET, FILM COATED ORAL at 22:26

## 2021-05-14 RX ADMIN — COLESEVELAM HYDROCHLORIDE 625 MG: 625 TABLET, FILM COATED ORAL at 11:31

## 2021-05-14 RX ADMIN — Medication 5 MG: at 21:37

## 2021-05-14 RX ADMIN — HYDROCORTISONE 10 MG: 10 TABLET ORAL at 06:07

## 2021-05-14 RX ADMIN — CETIRIZINE HYDROCHLORIDE 20 MG: 10 TABLET, FILM COATED ORAL at 21:37

## 2021-05-14 RX ADMIN — FUROSEMIDE 40 MG: 10 INJECTION, SOLUTION INTRAMUSCULAR; INTRAVENOUS at 15:03

## 2021-05-14 RX ADMIN — CARVEDILOL 12.5 MG: 6.25 TABLET, FILM COATED ORAL at 17:07

## 2021-05-14 RX ADMIN — ACETAMINOPHEN 650 MG: 325 TABLET, FILM COATED ORAL at 15:03

## 2021-05-14 RX ADMIN — AMITRIPTYLINE HYDROCHLORIDE 10 MG: 10 TABLET, FILM COATED ORAL at 06:07

## 2021-05-14 RX ADMIN — DULOXETINE HYDROCHLORIDE 60 MG: 30 CAPSULE, DELAYED RELEASE ORAL at 21:37

## 2021-05-14 RX ADMIN — MEROPENEM 1 G: 1 INJECTION, POWDER, FOR SOLUTION INTRAVENOUS at 06:06

## 2021-05-14 RX ADMIN — APIXABAN 5 MG: 5 TABLET, FILM COATED ORAL at 17:07

## 2021-05-14 RX ADMIN — GABAPENTIN 400 MG: 400 CAPSULE ORAL at 06:07

## 2021-05-14 ASSESSMENT — PAIN DESCRIPTION - PAIN TYPE
TYPE: CHRONIC PAIN
TYPE: CHRONIC PAIN

## 2021-05-14 ASSESSMENT — ENCOUNTER SYMPTOMS
HEARTBURN: 0
COUGH: 1
DIARRHEA: 0
PALPITATIONS: 0
CHILLS: 0
VOMITING: 0
ABDOMINAL PAIN: 0
NAUSEA: 0
SHORTNESS OF BREATH: 1
WHEEZING: 0
FEVER: 0

## 2021-05-14 NOTE — CARE PLAN
The patient is Watcher - Medium risk of patient condition declining or worsening    Shift Goals  Clinical Goals: Keep patient HR under 100.  Patient Goals: Breathe better    Progress made toward(s) clinical / shift goals:  Patient HR majority of shift under 100. HR did range from  which indicates improvement in comparison to previously being as high as the 130s during this course of stay.    Patient is not progressing towards the following goals:

## 2021-05-14 NOTE — PROGRESS NOTES
Telemetry Shift Summary    Rhythm: SR/ST w/ 1AVB  HR:   Ectopy: R-PVC    Measurements for strip printed 5/14/2021 at 1524    0.22 / 0.10 / 0.34    Normal Values  Rhythm: SR  HR:   Measurements: 0.12-0.20 / 0.06-0.10 / 0.30-0.52
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
## 2021-05-14 NOTE — DISCHARGE PLANNING
Anticipated Discharge Disposition:   Home tomorrow with possible DME oxygen     Action:   Chart review complete.     Per MD, patient is still undergoing diuresis. Possible discharge tomorrow. MD asked bedside RN to perform ambulatory O2 test. Per chart review, this patient was previously on service with preferred for DME oxygen.     1211: RN CM listened to a voicemail from romero Weems with with [employer] ([phone]). Dank was requesting a call back to discuss patient. AILYN SOTO called Dank. No answer, voicemail left with call back number.     1219: AILYN SOTO called Dank again. Dank given update on possible discharge date for patient.    Barriers to Discharge:   Home oxygen ambulation test results/order for DME  Medical Clearance    Plan:   Hospital care management will continue to assist with discharge planning needs.

## 2021-05-14 NOTE — PROGRESS NOTES
Updated Dr. Thomas's that the patient's lactic acid is 2.6 from 2.1. No new orders at this time, will continue with planned care.

## 2021-05-14 NOTE — PROGRESS NOTES
[facility] Medicine Daily Progress Note    Date of Service  5/14/2021    Chief Complaint  55 y.o. female admitted 5/10/2021 with SOB and lactic acidosis    Hospital Course  Donna Isaac is a 55 y.o. female who presents to the emergency department for evaluation of shortness of breath and leg swelling.  Stated symptoms started about 2 weeks ago with gradual progression. States with 10-20 feet ambulation she gets significantly short of breath. Also states she has been having symptoms getting herself dressed in the morning. Associated symptoms include leg swelling, cough and wheezing.   Also states she has been ill for some time with multiple chronic issues including chronic long-term antibiotics for polymicrobial sinusitis. She has been followed by [facility] infectious diseases and is currently on meropenem and Xarava for 5 weeks therapy.   Of note, she was recently in the ED for chest pain and shortness of breath, worked up and found to have a pulmonary embolism. She was started on Eliquis which she reports compliance.       In the ER, vital signs notable for hypertension. Labs with findings of elevated lactic acid. CT chest with no findings of acute PE or infiltrate/consolidation.       Interval Problem Update  5/11 - No clear etiology for pt's SOB or FLORES - will place on telemetry, EKG obtained has some sinus tach.  In reviewing old records, had low cortisol levels in the past - will give 10mg Decadron and check cosyntropin stim test. Was hypotensive in the ER but no longer hypotensive now. States she does still feel some SOB, is on 2L. Also states she has required O2 in the past after a bout of PNA.    5/12 - Pt with tachycardia into the 130s-140s today; EKG with some concern for PSVT/accelerated junctional rhythm. Adenosine 6mg then 12mg was administered, underlying rhythm with P waves, appears to be sinus rhythm.  Pt does not have s/s of sepsis or underlying infection. Discussed that she does have evidence of  adrenal insufficiency, she is willing to try hydrocortisone. Has some healing wounds but none appear infected.    5/13 - Pt with drop in blood pressure after administration of her home Coreg last evening, required a 500cc NS bolus to recover and had an associated lactic acidosis.  WBC remains stable and procalcitonin is normal; however, now has pulmonary edema. Start BID Lasix.     5/14 - Patient states her SOB is significantly improved; Is and Os and daily weights were not done, will discuss recording those with nursing.  Can likely transition to PO Lasix 40mg daily in the am.    Consultants/Specialty  Dr Alvarenga    Code Status  Full Code    Disposition  TBD     Review of Systems  Review of Systems   Constitutional: Negative for chills and fever.   Respiratory: Positive for cough and shortness of breath. Negative for wheezing.    Cardiovascular: Negative for chest pain, palpitations and leg swelling.   Gastrointestinal: Negative for abdominal pain, diarrhea, heartburn, nausea and vomiting.   All other systems reviewed and are negative.       Physical Exam  Temp:  [36.2 °C (97.2 °F)-36.7 °C (98 °F)] 36.3 °C (97.4 °F)  Pulse:  [] 101  Resp:  [18] 18  BP: ()/(39-92) 113/55  SpO2:  [89 %-98 %] 89 %    Physical Exam  Vitals and nursing note reviewed.   Constitutional:       Appearance: Normal appearance. She is not toxic-appearing.   HENT:      Head: Normocephalic and atraumatic.      Mouth/Throat:      Mouth: Mucous membranes are moist.   Cardiovascular:      Rate and Rhythm: Regular rhythm. Tachycardia present.      Heart sounds: No murmur heard.     Pulmonary:      Effort: Pulmonary effort is normal. No respiratory distress.      Breath sounds: No wheezing, rhonchi or rales.      Comments: Diminished bilaterally without wheezing  Abdominal:      General: Abdomen is flat. Bowel sounds are normal.      Palpations: Abdomen is soft.   Musculoskeletal:         General: No swelling.   Skin:     General: Skin  is warm and dry.      Comments: Skin survey - crack to L heel without surrounding erythema or purulence, two healing ulcers to the abdomen, no erythema or purulence   Neurological:      Mental Status: She is alert and oriented to person, place, and time.   Psychiatric:         Mood and Affect: Mood normal.         Behavior: Behavior normal.         Fluids    Intake/Output Summary (Last 24 hours) at 5/14/2021 0857  Last data filed at 5/14/2021 0636  Gross per 24 hour   Intake 680 ml   Output --   Net 680 ml       Laboratory  Recent Labs     05/12/21 0311 05/13/21 0326 05/14/21 0310   WBC 13.8* 12.7* 7.8   RBC 4.49 4.33 4.17*   HEMOGLOBIN 13.4 13.1 12.5   HEMATOCRIT 41.9 41.8 39.2   MCV 93.3 96.5 94.0   MCH 29.8 30.3 30.0   MCHC 32.0* 31.3* 31.9*   RDW 56.9* 59.1* 55.4*   PLATELETCT 268 271 264   MPV 11.7 11.7 11.5     Recent Labs     05/12/21 0311 05/13/21 0326 05/14/21 0310   SODIUM 135 137 135   POTASSIUM 5.2 5.5 4.9   CHLORIDE 101 104 101   CO2 22 22 25   GLUCOSE 148* 138* 125*   BUN 15 23* 28*   CREATININE 0.93 1.18 0.98   CALCIUM 8.4 7.9* 7.9*                   Imaging  DX-CHEST-PORTABLE (1 VIEW)   Final Result         1.  Pulmonary edema and/or infiltrates.      EC-ECHOCARDIOGRAM LTD W/O CONT   Final Result      CT-CTA CHEST PULMONARY ARTERY W/ RECONS   Final Result      1.  No CT evidence of pulmonary embolism. Left lower lobe pulmonary embolus seen on prior CT is not appreciated on the current exam.      2.  Mild pulmonary opacifications noted as described above. Atelectasis may be present. Mild edema or pneumonitis is also possible.      3.  Hepatic steatosis.            DX-CHEST-PORTABLE (1 VIEW)   Final Result      No acute cardiopulmonary findings.           Assessment/Plan  * FLORES (dyspnea on exertion)  Assessment & Plan  - Presenting with dyspnea on exertion with 10 to 20 feet of ambulation.  - Recently diagnosed with PTE in April, CTA this stay without evidence of PTE, continues on Eliquis - CT  did show possible edema or pneumonitis, but pt does not appear volume overloaded   - had wheezing on exam per H&P on admit, Prednisone given   - Echo without evidence of recurrence of her cardiomyopathy, but does have peripheral edema and rales - diuresing  - Continue breathing treatments, no wheezing on exam   - Ambulatory O2 sat performed, pt did not drop below 91%, will repeat today as pt discharging tomorrow  - Sputum culture negative    Cardiac volume overload secondary to a/c diastolic CHF  Assessment & Plan  - Cardiac and iatrogenic in nature - became hypotensive after receiving her home dose of Coreg, was given 500cc NS bolus  - New pulmonary edema on CXR after bolus - continue BID diuresis and monitor lactic acids  - Likely with some diastolic dysfunction  - Continue BB, cannot add Losartan or Aldacatone due to BPs    Adrenal insufficiency ([place]aver[place]'s disease) (HCC)  Assessment & Plan  - Abnormal cosyntropin stim test  - Started hydrocortisone BID - pt has an Endocrinologist that she follows with who manages her thyroid issues, will have her follow up with them upon discharge     Inappropriate sinus tachycardia  Assessment & Plan  - TSH and free T4 a little askew, but free T3 is normal - as T4 is slightly low, very unlikely this would contribute to her tachycardia   - EKG with question of an accelerated junctional rhythm/PSVT - discussed with Dr Alvarenga, gave 6mg of adenosine, rate did not slow significantly. Administered 12mg and rate slowed with P waves present suggesting sinus component.   - Pt with normal procal, negative UA, no significant infiltrate on imaging, and is on appropriate antibiotics for her known MRSA/Pseudomonas sinusitis per [facility] ID. White count is up today, but likely due to steroid administration.  - Bps dropped with resumption of her home Coreg, decreased dose to 12.5mg BID   - Blood cultures negative  - Recently had a PTE but no longer seen on imaging here   - Changed  Albuterol to Xopenex       Hx of migraines  Assessment & Plan  We will hold home migraine medications inpatient.    Pulmonary embolism (HCC)  Assessment & Plan  Diagnosed about 2 weeks ago.  Continue home Eliquis medication.  Repeat CTA chest here without evidence of PTE.    Lactic acidosis  Assessment & Plan  Secondary to hypotension - stable.     Steroid dependence (HCC)- (present on admission)  Assessment & Plan  - On decadron chronically, pt unsure what for  - Notes from Vascular indicate it is for her periodic hypotension started by Endocrine    Acute sinusitis- (present on admission)  Assessment & Plan  - Pt was started on Merrem and Xerava on 4/20 by Lynnette ID  - MRSA and Pseudomonas by culture per ID   - Continue Merrem and Xerava  - appreciate consult from Lynnette ID    Hypertension- (present on admission)  Assessment & Plan  -Pt on Losartan, Coreg, and Cardura at home, having labile Bps here; doing well on 12.5 Coreg BID today for her tachycardia and monitor BPs  -Pt follows with Dr Bloch as an outpatient for BP management; his notes indicate 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 TSH low but free T4 also low,T3 level is normal; will have pt follow up with her Endocrinologist as an outpatient     Hypotension  Assessment & Plan  - Procalcitonin is normal, UA benign, no infiltrate on chest imaging, skin survey without acute infection  - Hypotension may have contribution from adrenal insufficiency, hydrocortisone started  - Rise in white count  likely secondary to decadron and continued prednisone, procalcitonin remains normal, pt without fever  - Pt became hypotensive 5/12 after receiving home Coreg for tachycardia - did much better on 12.5mg dosing        Class 1 obesity in adult- (present on admission)  Assessment & Plan  Weight loss and  dietary counseling advised.    Fibromyalgia- (present on admission)  Assessment & Plan  Continue home meds        VTE prophylaxis: Eliquis

## 2021-05-14 NOTE — PROGRESS NOTES
Informed Dr. Andrews that walking O2 was done with Maverick CHANDRA. Per Maverick, pt desat to the 70s while ambulating with 4L and felt unsafe attempting a RA saturation. Agreed with Maverick's findings. Pt placed back on 2L for resting in bed. While resting in bed, SaO2 maintains between 90-92%.

## 2021-05-14 NOTE — PROGRESS NOTES
Telemetry Shift Summary    Rhythm: ST  HR: 100-110s  Ectopy: R-PVC, R-PAC    Measurements for strip printed 5/13/2021 at 1348    0.16 / 0.010 / 0.34    Normal Values  Rhythm: SR  HR:   Measurements: 0.12-0.20 / 0.06-0.10 / 0.30-0.52

## 2021-05-14 NOTE — PROGRESS NOTES
Telemetry Shift Summary     Rhythm SR, ST  HR Range   Ectopy  rare PVCs  Measurements .20/.10/.38              Normal Values  Rhythm SR  HR Range    Measurements 0.12-0.20 / 0.06-0.10  / 0.30-0.52

## 2021-05-14 NOTE — CARE PLAN
Problem: Infection  Goal: Will remain free from infection  Outcome: Progressing  Note: Scheduled IV abx ordered. Pt afebrile.     Problem: Respiratory:  Goal: Respiratory status will improve  Outcome: Progressing  Note: Pt currently on 2L O2, plan for walking O2 today. Pt with clear over dim breath sounds bilaterally.     Problem: Fluid Volume:  Goal: Will maintain balanced intake and output  Outcome: Progressing  Note: Strict I/O and daily weights. Pt educated the need to monitor urine output.       The patient is Watcher - Medium risk of patient condition declining or worsening    Shift Goals  Clinical Goals: Keep patient HR under 100.  Patient Goals: Breathe better

## 2021-05-14 NOTE — PROGRESS NOTES
Bedside report received from Alivia ROBERTSON. Patient is in bed and stable. Bed alarm is on. Bed locked and in lowest position, upper side rails up, call light in reach, whiteboard updated. POC discussed and pt verbalizes understanding.

## 2021-05-15 ENCOUNTER — PHARMACY VISIT (OUTPATIENT)
Dept: PHARMACY | Facility: MEDICAL CENTER | Age: 56
End: 2021-05-15
Payer: COMMERCIAL

## 2021-05-15 VITALS
TEMPERATURE: 98.1 F | OXYGEN SATURATION: 92 % | SYSTOLIC BLOOD PRESSURE: 91 MMHG | HEART RATE: 96 BPM | HEIGHT: 64 IN | WEIGHT: 223.77 LBS | BODY MASS INDEX: 38.2 KG/M2 | DIASTOLIC BLOOD PRESSURE: 65 MMHG | RESPIRATION RATE: 18 BRPM

## 2021-05-15 LAB
ANION GAP SERPL CALC-SCNC: 7 MMOL/L (ref 7–16)
BACTERIA BLD CULT: NORMAL
BACTERIA BLD CULT: NORMAL
BASOPHILS # BLD AUTO: 0 % (ref 0–1.8)
BASOPHILS # BLD: 0 K/UL (ref 0–0.12)
BUN SERPL-MCNC: 34 MG/DL (ref 8–22)
CALCIUM SERPL-MCNC: 7.8 MG/DL (ref 8.4–10.2)
CHLORIDE SERPL-SCNC: 100 MMOL/L (ref 96–112)
CO2 SERPL-SCNC: 29 MMOL/L (ref 20–33)
CREAT SERPL-MCNC: 1.08 MG/DL (ref 0.5–1.4)
EOSINOPHIL # BLD AUTO: 0.09 K/UL (ref 0–0.51)
EOSINOPHIL NFR BLD: 1.5 % (ref 0–6.9)
ERYTHROCYTE [DISTWIDTH] IN BLOOD BY AUTOMATED COUNT: 56 FL (ref 35.9–50)
GLUCOSE BLD-MCNC: 86 MG/DL (ref 65–99)
GLUCOSE BLD-MCNC: 94 MG/DL (ref 65–99)
GLUCOSE SERPL-MCNC: 95 MG/DL (ref 65–99)
HCT VFR BLD AUTO: 38.3 % (ref 37–47)
HGB BLD-MCNC: 11.9 G/DL (ref 12–16)
IMM GRANULOCYTES # BLD AUTO: 0.02 K/UL (ref 0–0.11)
IMM GRANULOCYTES NFR BLD AUTO: 0.3 % (ref 0–0.9)
LYMPHOCYTES # BLD AUTO: 2.01 K/UL (ref 1–4.8)
LYMPHOCYTES NFR BLD: 34.4 % (ref 22–41)
MAGNESIUM SERPL-MCNC: 1.8 MG/DL (ref 1.5–2.5)
MCH RBC QN AUTO: 29.4 PG (ref 27–33)
MCHC RBC AUTO-ENTMCNC: 31.1 G/DL (ref 33.6–35)
MCV RBC AUTO: 94.6 FL (ref 81.4–97.8)
MONOCYTES # BLD AUTO: 0.98 K/UL (ref 0–0.85)
MONOCYTES NFR BLD AUTO: 16.8 % (ref 0–13.4)
NEUTROPHILS # BLD AUTO: 2.75 K/UL (ref 2–7.15)
NEUTROPHILS NFR BLD: 47 % (ref 44–72)
NRBC # BLD AUTO: 0.02 K/UL
NRBC BLD-RTO: 0.3 /100 WBC
PLATELET # BLD AUTO: 293 K/UL (ref 164–446)
PMV BLD AUTO: 11.9 FL (ref 9–12.9)
POTASSIUM SERPL-SCNC: 3.7 MMOL/L (ref 3.6–5.5)
RBC # BLD AUTO: 4.05 M/UL (ref 4.2–5.4)
SIGNIFICANT IND 70042: NORMAL
SIGNIFICANT IND 70042: NORMAL
SITE SITE: NORMAL
SITE SITE: NORMAL
SODIUM SERPL-SCNC: 136 MMOL/L (ref 135–145)
SOURCE SOURCE: NORMAL
SOURCE SOURCE: NORMAL
WBC # BLD AUTO: 5.9 K/UL (ref 4.8–10.8)

## 2021-05-15 PROCEDURE — 700111 HCHG RX REV CODE 636 W/ 250 OVERRIDE (IP): Performed by: STUDENT IN AN ORGANIZED HEALTH CARE EDUCATION/TRAINING PROGRAM

## 2021-05-15 PROCEDURE — 700102 HCHG RX REV CODE 250 W/ 637 OVERRIDE(OP): Performed by: FAMILY MEDICINE

## 2021-05-15 PROCEDURE — 700105 HCHG RX REV CODE 258: Performed by: STUDENT IN AN ORGANIZED HEALTH CARE EDUCATION/TRAINING PROGRAM

## 2021-05-15 PROCEDURE — 99239 HOSP IP/OBS DSCHRG MGMT >30: CPT | Performed by: FAMILY MEDICINE

## 2021-05-15 PROCEDURE — 85025 COMPLETE CBC W/AUTO DIFF WBC: CPT

## 2021-05-15 PROCEDURE — RXMED WILLOW AMBULATORY MEDICATION CHARGE: Performed by: FAMILY MEDICINE

## 2021-05-15 PROCEDURE — 700102 HCHG RX REV CODE 250 W/ 637 OVERRIDE(OP): Performed by: STUDENT IN AN ORGANIZED HEALTH CARE EDUCATION/TRAINING PROGRAM

## 2021-05-15 PROCEDURE — A9270 NON-COVERED ITEM OR SERVICE: HCPCS | Performed by: STUDENT IN AN ORGANIZED HEALTH CARE EDUCATION/TRAINING PROGRAM

## 2021-05-15 PROCEDURE — 83735 ASSAY OF MAGNESIUM: CPT

## 2021-05-15 PROCEDURE — 700111 HCHG RX REV CODE 636 W/ 250 OVERRIDE (IP): Performed by: FAMILY MEDICINE

## 2021-05-15 PROCEDURE — 80048 BASIC METABOLIC PNL TOTAL CA: CPT

## 2021-05-15 PROCEDURE — 82962 GLUCOSE BLOOD TEST: CPT | Mod: 91

## 2021-05-15 PROCEDURE — A9270 NON-COVERED ITEM OR SERVICE: HCPCS | Performed by: FAMILY MEDICINE

## 2021-05-15 RX ORDER — HYDROCORTISONE 5 MG/1
5 TABLET ORAL
Qty: 30 TABLET | Refills: 0 | Status: ON HOLD | OUTPATIENT
Start: 2021-05-16 | End: 2021-06-18

## 2021-05-15 RX ORDER — LEVALBUTEROL TARTRATE 45 UG/1
1-2 AEROSOL, METERED ORAL EVERY 4 HOURS PRN
Qty: 15 G | Refills: 0 | Status: ON HOLD | OUTPATIENT
Start: 2021-05-15 | End: 2021-07-09

## 2021-05-15 RX ORDER — CARVEDILOL 12.5 MG/1
12.5 TABLET ORAL 2 TIMES DAILY WITH MEALS
Qty: 60 TABLET | Refills: 0 | Status: ON HOLD | OUTPATIENT
Start: 2021-05-15 | End: 2021-06-03

## 2021-05-15 RX ORDER — HYDROCORTISONE 10 MG/1
10 TABLET ORAL EVERY MORNING
Qty: 30 TABLET | Refills: 0 | Status: ON HOLD | OUTPATIENT
Start: 2021-05-16 | End: 2021-05-23 | Stop reason: SDUPTHER

## 2021-05-15 RX ORDER — FUROSEMIDE 40 MG/1
40 TABLET ORAL DAILY
Qty: 30 TABLET | Refills: 0 | Status: ON HOLD | OUTPATIENT
Start: 2021-05-15 | End: 2021-06-18

## 2021-05-15 RX ADMIN — MEROPENEM 1 G: 1 INJECTION, POWDER, FOR SOLUTION INTRAVENOUS at 05:42

## 2021-05-15 RX ADMIN — AMITRIPTYLINE HYDROCHLORIDE 10 MG: 10 TABLET, FILM COATED ORAL at 05:42

## 2021-05-15 RX ADMIN — ACETAMINOPHEN 650 MG: 325 TABLET, FILM COATED ORAL at 08:03

## 2021-05-15 RX ADMIN — APIXABAN 5 MG: 5 TABLET, FILM COATED ORAL at 05:42

## 2021-05-15 RX ADMIN — CARVEDILOL 12.5 MG: 6.25 TABLET, FILM COATED ORAL at 07:59

## 2021-05-15 RX ADMIN — FUROSEMIDE 40 MG: 10 INJECTION, SOLUTION INTRAMUSCULAR; INTRAVENOUS at 05:43

## 2021-05-15 RX ADMIN — MEROPENEM 1 G: 1 INJECTION, POWDER, FOR SOLUTION INTRAVENOUS at 13:07

## 2021-05-15 RX ADMIN — COLESEVELAM HYDROCHLORIDE 625 MG: 625 TABLET, FILM COATED ORAL at 11:37

## 2021-05-15 RX ADMIN — CETIRIZINE HYDROCHLORIDE 20 MG: 10 TABLET, FILM COATED ORAL at 09:31

## 2021-05-15 RX ADMIN — COLESEVELAM HYDROCHLORIDE 625 MG: 625 TABLET, FILM COATED ORAL at 07:59

## 2021-05-15 RX ADMIN — GABAPENTIN 400 MG: 400 CAPSULE ORAL at 05:42

## 2021-05-15 RX ADMIN — HYDROCORTISONE 10 MG: 10 TABLET ORAL at 05:42

## 2021-05-15 RX ADMIN — HYDROCORTISONE 5 MG: 10 TABLET ORAL at 13:07

## 2021-05-15 RX ADMIN — GABAPENTIN 400 MG: 400 CAPSULE ORAL at 11:37

## 2021-05-15 RX ADMIN — FUROSEMIDE 40 MG: 10 INJECTION, SOLUTION INTRAMUSCULAR; INTRAVENOUS at 16:11

## 2021-05-15 ASSESSMENT — FIBROSIS 4 INDEX: FIB4 SCORE: 1.15

## 2021-05-15 NOTE — PROGRESS NOTES
Telemetry Shift Summary    Rhythm SR-ST w/BBB  HR Range   Ectopy rare to occasional PVCs and PACs  Measurements .20/.12/.42        Normal Values  Rhythm SR  HR Range    Measurements 0.12-0.20 / 0.06-0.10  / 0.30-0.52

## 2021-05-15 NOTE — FACE TO FACE
"Face to Face Note  -  Durable Medical Equipment    Corazon Andrews M.D. - NPI: [id number]  I certify that this patient is under my care and that they had a durable medical equipment(DME)face to face encounter by myself that meets the physician DME face-to-face encounter requirements with this patient on:    Date of encounter:   Patient:                    MRN:                       YOB: 2022  Donna Isaac  [id number]  1965     The encounter with the patient was in whole, or in part, for the following medical condition, which is the primary reason for durable medical equipment:  Other - diastolic CHF    I certify that, based on my findings, the following durable medical equipment is medically necessary:  Oxygen.    HOME O2 Saturation Measurements:(Values must be present for Home Oxygen orders)  Room air sat at rest:  (unable to maintain room air. Unsafe)  Room air sat with amb:  (unable to maintain 02 sat. Unsafe.)  With liters of O2: 6, O2 sat at rest with O2: 92 (pt desat quickly- sat down & moved to 6L)  With Liters of O2: 4, O2 sat with amb with O2 : 82  Is the patient mobile?: Yes    My Clinical findings support the need for the above equipment due to:  Hypoxia    Supporting Symptoms: The patient requires supplemental oxygen, as the following interventions have been tried with limited or no improvement: \"Incentive spirometry    If patient feels more short of breath, they can go up to 6 liters per minute and contact healthcare provider.  "

## 2021-05-15 NOTE — DISCHARGE INSTRUCTIONS
Diet: Diet Order Diet: Consistent CHO (Diabetic), 2gm Sodium  Activity: As tolerated  Follow Up: Please contact your Endocrinologist and make an appt with them at first available appointment.  Follow up with the heart failure clinic with Cardiology, and your primary care doctor in one week to check your potassium.  Disposition:Home  Diagnosis: FLORES (dyspnea on exertion)    Follow up with your Primary Care Provider Madisyn Mccullough M.D. as scheduled or sooner if your symptoms persist or worsen.  Return to Emergency Room for severe chest pain, shortness of breath, signs of a stroke, or any other emergencies.      Discharge Instructions    Discharged to home by car with relative. Discharged via wheelchair, hospital escort: Yes.  Special equipment needed: Not Applicable    Be sure to schedule a follow-up appointment with your primary care doctor or any specialists as instructed.     Discharge Plan:        I understand that a diet low in cholesterol, fat, and sodium is recommended for good health. Unless I have been given specific instructions below for another diet, I accept this instruction as my diet prescription.   Other diet: Diabetic and 2 gram sodium     Special Instructions: None    · Is patient discharged on Warfarin / Coumadin?   No       Shortness of Breath, Adult  Shortness of breath means you have trouble breathing. Shortness of breath could be a sign of a medical problem.  Follow these instructions at home:    · Watch for any changes in your symptoms.  · Do not use any products that contain nicotine or tobacco, such as cigarettes, e-cigarettes, and chewing tobacco.  · Do not smoke. Smoking can cause shortness of breath. If you need help to quit smoking, ask your doctor.  · Avoid things that can make it harder to breathe, such as:  ? Mold.  ? Dust.  ? Air pollution.  ? Chemical smells.  ? Things that can cause allergy symptoms (allergens), if you have allergies.  · Keep your living space clean. Use  products that help remove mold and dust.  · Rest as needed. Slowly return to your normal activities.  · Take over-the-counter and prescription medicines only as told by your doctor. This includes oxygen therapy and inhaled medicines.  · Keep all follow-up visits as told by your doctor. This is important.  Contact a doctor if:  · Your condition does not get better as soon as expected.  · You have a hard time doing your normal activities, even after you rest.  · You have new symptoms.  Get help right away if:  · Your shortness of breath gets worse.  · You have trouble breathing when you are resting.  · You feel light-headed or you pass out (faint).  · You have a cough that is not helped by medicines.  · You cough up blood.  · You have pain with breathing.  · You have pain in your chest, arms, shoulders, or belly (abdomen).  · You have a fever.  · You cannot walk up stairs.  · You cannot exercise the way you normally do.  These symptoms may represent a serious problem that is an emergency. Do not wait to see if the symptoms will go away. Get medical help right away. Call your local emergency services (911 in the [place] Do not drive yourself to the hospital.  Summary  · Shortness of breath is when you have trouble breathing enough air. It can be a sign of a medical problem.  · Avoid things that make it hard for you to breathe, such as smoking, pollution, mold, and dust.  · Watch for any changes in your symptoms. Contact your doctor if you do not get better or you get worse.  This information is not intended to replace advice given to you by your health care provider. Make sure you discuss any questions you have with your health care provider.  Document Released: 06/05/2009 Document Revised: 05/20/2019 Document Reviewed: 05/20/2019  [employer] Patient Education © 2020 [employer]      Depression / Suicide Risk    As you are discharged from this [facility] facility, it is important to learn how to keep safe from  harming yourself.    Recognize the warning signs:  · Abrupt changes in personality, positive or negative- including increase in energy   · Giving away possessions  · Change in eating patterns- significant weight changes-  positive or negative  · Change in sleeping patterns- unable to sleep or sleeping all the time   · Unwillingness or inability to communicate  · Depression  · Unusual sadness, discouragement and loneliness  · Talk of wanting to die  · Neglect of personal appearance   · Rebelliousness- reckless behavior  · Withdrawal from people/activities they love  · Confusion- inability to concentrate     If you or a loved one observes any of these behaviors or has concerns about self-harm, here's what you can do:  · Talk about it- your feelings and reasons for harming yourself  · Remove any means that you might use to hurt yourself (examples: pills, rope, extension cords, firearm)  · Get professional help from the community (Mental Health, Substance Abuse, psychological counseling)  · Do not be alone:Call your Safe Contact- someone whom you trust who will be there for you.  · Call your local CRISIS HOTLINE 617-3945 or 233-358-2362  · Call your local Children's Mobile Crisis Response Team [place] (438) 991-3276 or www.SimplyBox  · Call the toll free National Suicide Prevention Hotlines   · National Suicide Prevention Lifeline 473-523-KMRY (7736)  · National Hope Line Network 800-SUICIDE (618-9131)

## 2021-05-15 NOTE — DISCHARGE PLANNING
Received Choice form at 1209  Agency/Facility Name: [facility]  Referral sent per Choice form @ 0378

## 2021-05-15 NOTE — PROGRESS NOTES
Telemetry Shift Summary     Rhythm SR with BBB  HR Range 86-94  Ectopy  rare PVCs  Measurements .20/.12/.40              Normal Values  Rhythm SR  HR Range    Measurements 0.12-0.20 / 0.06-0.10  / 0.30-0.52

## 2021-05-15 NOTE — PROGRESS NOTES
Pt discharged to home. Discharge instructions provided to pt. Pt verbalizes understanding. Pt states all questions have been answered. Signed copy in chart. Prescriptions delivered to bedside via meds to beds. Home antibiotics sent home with patient. Pt states that all personal belongings are in possession. Pt off unit via wheelchair, escorted by transport.

## 2021-05-15 NOTE — DISCHARGE SUMMARY
Discharge Summary    CHIEF COMPLAINT ON ADMISSION  Chief Complaint   Patient presents with   • Shortness of Breath     started yesterday, having to stop frequently to catch breath       Reason for Admission  Shortness of breath,Back Pain     Admission Date  5/10/2021    CODE STATUS  Full Code    HPI & HOSPITAL COURSE  This is a 55 y.o. female here with SOB that was multifactorial in nature - the primary cause being an asthma exacerbation.  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  She improved significantly with diuresis, and blood pressures have remained stable on just 12.5mg Coreg BID. Unfortunately, Bps will not tolerate addition of an ARB or Aldactone for her heart failure at this time. She is currently stable on 1L O2 and is euvolemic on examination - she will continue Lasix orally upon discharge and follow up with the heart failure clinic; I am hoping she will not require O2 in a week or two.    Pt also has inappropriate sinus tachycardia; there was question of SVT given a high rate, but adenosine was administered and demonstrated an underlying sinus rhythm. Addition of Coreg has improved her tachycardia; she has a chronic sinusitis for which she has Mergiuseppe and Xerava from [facility], but there has been no evidence that her tachycardia is related to underlying infection or sepsis - she was never febrile, WBC has been normal other than immediately after receiving decadron, and procalcitonin was normal x2. I have switched her home Albuterol to Xopenex to help with the tachycardia.    The patient's hypotension was suspicious for underlying Ne[place] Perce's - she states her Endocrinologist had her on 0.5mg Decadron daily for about two months, but she  tells me she stopped taking it about a month ago; her baseline am cortisol level was low on 4/28 at 2.8, and her cosyntropin stim test was suggestive of adrenal insufficiency as her cortisol level andrae only from 4.4 to 11.5.  I started her on BID Cortef and her blood pressures have improved.  I discussed the diagnosis with her and the importance of following up with her Endocrinologist, Dr Jeff, in a timely manner for further management.         Therefore, she is discharged in good and stable condition to home with close outpatient follow-up.    The patient met 2-midnight criteria for an inpatient stay at the time of discharge.    Discharge Date  5/15/21    FOLLOW UP ITEMS POST DISCHARGE  None    DISCHARGE DIAGNOSES  Principal Problem:    FLORES (dyspnea on exertion) POA: Unknown  Active Problems:    Fibromyalgia POA: Yes    Class 1 obesity in adult POA: Yes    Hypotension POA: Unknown    Thyroid disease POA: Yes    Hypertension POA: Yes    Acute sinusitis POA: Yes    Steroid dependence (HCC) POA: Yes    Lactic acidosis POA: Unknown    Pulmonary embolism (HCC) POA: Unknown    Hx of migraines POA: Unknown    Inappropriate sinus tachycardia POA: Unknown    Adrenal insufficiency (Valentín's disease) (HCC) POA: Unknown    Cardiac volume overload secondary to a/c diastolic CHF POA: Unknown  Resolved Problems:    * No resolved hospital problems. *      FOLLOW UP  Future Appointments   Date Time Provider Department Center   5/24/2021  3:40 PM Michael J Bloch, M.D. [facility] None   7/6/2021  9:40 AM JASPER Del Toro [facility] None   9/13/2021  8:15 AM [facility] EXAM 12 ECHO [facility]   9/20/2021  8:00 AM Eligio Torres M.D. [facility] None     Madisyn Mccullough M.D.  [address]  [facility] [address]  [facility] [id number] [phone]    In 1 week  For blood pressure and lab check for potassium    Heart failure clinic with [facility] - they will contact you      Please contact the [facility] Heart Failure Clinic if you have not  heard from them in one week    Alber Jeff M.D.  [address]  [facility] [id number] [phone]    Schedule an appointment as soon as possible for a visit        MEDICATIONS ON DISCHARGE     Medication List      START taking these medications      Instructions   furosemide 40 MG Tabs  Commonly known as: LASIX   Take 1 tablet by mouth every day.  Dose: 40 mg     * hydrocortisone 10 MG Tabs  Start taking on: May 16, 2021  Commonly known as: CORTEF   Take 1 tablet by mouth every morning.  Dose: 10 mg     * hydrocortisone 5 MG Tabs  Start taking on: May 16, 2021  Commonly known as: CORTEF   Take 1 tablet by mouth 1/2 hour after lunch.  Dose: 5 mg     levalbuterol 45 MCG/ACT inhaler  Commonly known as: XOPENEX HFA   Inhale 1-2 Puffs every four hours as needed for Shortness of Breath.  Dose: 1-2 Puff         * This list has 2 medication(s) that are the same as other medications prescribed for you. Read the directions carefully, and ask your doctor or other care provider to review them with you.            CHANGE how you take these medications      Instructions   amitriptyline 10 MG Tabs  What changed: See the new instructions.  Commonly known as: ELAVIL   TAKE 2 TABLETS BY MOUTH EVERY NIGHT AT BEDTIME, AND 1 TABLET BY MOUTH EVERY MORNING     apixaban 5mg Tabs  What changed: additional instructions  Commonly known as: ELIQUIS   Take 1 tablet by mouth 2 times a day for 30 days. After completion of loading dose of 10 mg po bid x 7 days, take 5 mg po bid  Dose: 5 mg     carvedilol 12.5 MG Tabs  What changed:   · medication strength  · how much to take  Commonly known as: COREG   Take 1 tablet by mouth 2 times a day with meals.  Dose: 12.5 mg        CONTINUE taking these medications      Instructions   calcitonin (salmon) 200 UNIT/ACT Soln  Commonly known as: MIACALCIN   Spray 1 Spray in nose every bedtime.  Dose: 1 Spray     cetirizine 10 MG Tabs  Commonly known as: ZYRTEC   Take 20 mg by mouth 2 (two)  times a day.  Dose: 20 mg     colesevelam 625 MG Tabs  Commonly known as: WELCHOL   Take 625 mg by mouth 3 times a day, with meals.  Dose: 625 mg     cromolyn 100 MG/5ML solution  Commonly known as: GASTROCROM   Take 200 mg by mouth 3 times a day before meals.  Dose: 200 mg     cyanocobalamin 1000 MCG/ML Soln  Commonly known as: VITAMIN B-12   Inject 1,000 mcg into the shoulder, thigh, or buttocks every Saturday.  Dose: 1,000 mcg     Dexilant 60 MG Cpdr delayed-release capsule  Generic drug: Dexlansoprazole   Take 60 mg by mouth every morning.  Dose: 60 mg     DULoxetine 60 MG Cpep delayed-release capsule  Commonly known as: CYMBALTA   Take 60 mg by mouth every bedtime.  Dose: 60 mg     Erenumab 70 MG/ML Soaj  Commonly known as: AIMOVIG   Inject 140 mg under the skin Q30 DAYS.  Dose: 140 mg     estradiol 0.5 MG tablet  Commonly known as: ESTRACE   Take 0.5 mg by mouth every morning.  Dose: 0.5 mg     Frova 2.5 MG Tabs  Generic drug: Frovatriptan Succinate   Take 2.5 mg by mouth as needed (For migraines).  Dose: 2.5 mg     gabapentin 400 MG Caps  Commonly known as: NEURONTIN   Take 1 Cap by mouth 3 times a day.  Dose: 400 mg     Jardiance 10 MG Tabs  Generic drug: Empagliflozin   Take 10 mg by mouth every bedtime.  Dose: 10 mg     levothyroxine 75 MCG Tabs  Commonly known as: SYNTHROID   Take 75 mcg by mouth every evening.  Dose: 75 mcg     liothyronine 25 MCG Tabs  Commonly known as: CYTOMEL   Take 25 mcg by mouth every bedtime.  Dose: 25 mcg     Magnesium 400 MG Tabs   Take 400 mg by mouth every evening.  Dose: 400 mg     meropenem 1 GM Solr  Commonly known as: MERREM   Infuse 1 g into a venous catheter every 8 hours. Pt started a 5 week course of MERREM on 4/20  Dose: 1 g     montelukast 10 MG Tabs  Commonly known as: SINGULAIR   Take 10 mg by mouth every evening.  Dose: 10 mg     multivitamin Tabs   Take 1 Tab by mouth every day.  Dose: 1 tablet     ondansetron 4 MG Tbdp  Commonly known as: ZOFRAN ODT   Take 4 mg  by mouth 2 times a day as needed for Nausea.  Dose: 4 mg     Vitamin D3 2000 UNIT Caps   Take 2,000 Units by mouth every day.  Dose: 2,000 Units     vitamin k 100 MCG tablet   Take 100 mcg by mouth every day.  Dose: 100 mcg     Voltaren 1 % Gel  Generic drug: diclofenac sodium   Apply 1 Application topically 1 time a day as needed.  Dose: 1 Application     Xerava 100 MG Solr  Generic drug: Eravacycline Dihydrochloride   Infuse 100 mg into a venous catheter 2 times a day. Pt started a 5 week course of XERAVA on 4/20  Dose: 100 mg     Zomacton 10 MG Solr  Generic drug: Somatropin   Inject 0.3 mg under the skin every bedtime.  Dose: 0.3 mg        STOP taking these medications    albuterol 108 (90 Base) MCG/ACT Aers inhalation aerosol     dexamethasone 0.5 MG Tabs  Commonly known as: DECADRON     doxazosin 2 MG Tabs  Commonly known as: CARDURA     losartan 100 MG Tabs  Commonly known as: COZAAR            Allergies  Allergies   Allergen Reactions   • Ancef [Cefazolin] Hives and Shortness of Breath   • Bactrim [Sulfamethoxazole W-Trimethoprim] Shortness of Breath   • Bee Venom Anaphylaxis   • Buprenorphine Anaphylaxis   • Buprenorphine Hives and Shortness of Breath   • Clindamycin Hives and Shortness of Breath   • Contrast Media With Iodine [Iodine] Hives and Swelling   • Doxycycline Hives and Shortness of Breath   • Econazole Anaphylaxis   • Econazole Nitrate [Ecoza] Anaphylaxis   • Flagyl  [Metronidazole] Hives and Unspecified   • Floxin Otic Anaphylaxis   • Floxin [Ofloxacin] Anaphylaxis, Shortness of Breath and Swelling     Cause throat swelling and difficulty breathing   • Gadolinium Derivatives Hives and Swelling     Throat swelling   • Hydrocodone-Acetaminophen Hives and Shortness of Breath   • Iodine Shortness of Breath and Anaphylaxis     Throat and tongue swelling with IV contrast   • Keflex Shortness of Breath   • Keflex Hives and Shortness of Breath   • Levaquin Shortness of Breath     anaphlyaxis   •  "Levaquin Anaphylaxis   • Levofloxacin Shortness of Breath   • Morphine Anaphylaxis   • Naloxone Hives     \"hives, SOB\"   • Naloxone Hives and Shortness of Breath   • Nitrofurantoin Shortness of Breath     ...and hives     • Nitrofurantoin Hives and Shortness of Breath   • Norco [Apap-Fd&C Yellow #10 Al Tai-Hydrocodone] Shortness of Breath     ...and hives     • Nyquil Hives and Shortness of Breath   • Oxycodone Shortness of Breath     ...and hives     • Oxycodone Hcl Hives and Shortness of Breath   • Paricalcitol Hives   • Paricalcitol Hives, Shortness of Breath and Unspecified     CHEST PAIN   • Penicillins Shortness of Breath     ...and hives     • Penicillins Hives and Shortness of Breath   • Tape Contact Dermatitis and Swelling     Blisters, clear tegaderm ok.. No steristrips.  Other reaction(s): Other, Unknown   • Tramadol Hives   • Vicks Dayquil Cold Hives and Shortness of Breath   • Ancef [Cefazolin] Hives   • Azithromycin Hives and Shortness of Breath     Pt had Hives also   • Bextra [Valdecoxib] Rash     \"Skin burn and peeling.\"   • Flagyl [Kdc:Metronidazole+Tartrazine] Hives   • Gadolinium Swelling and Hives   • Linezolid Rash     Rash all over body   • Nyquil Hives and Shortness of Breath     Other reaction(s): Respiratory Distress   • Other Drug Hives     \"Enconazole nitrate.\" shortness of breath and hives   • Other Misc Unspecified     Adhesive tape: blisters, skin peels off   • Clindamycin      HIVES     • Clindamycin Hcl      Other reaction(s): hives   • Codeine Shortness of Breath and Rash     Rash & SOB   • Iodinated Diagnostic Agents  [Diagnostic X-Ray Materials]    • Sulfa Drugs      Other reaction(s): Hives   • Tramadol      Rash/hives   • Tygacil [Tigecycline]      itching   • Bextra [Valdecoxib] Unspecified     Skin blisters and peels off   • Tape Unspecified     Blisters and peels off       DIET  Orders Placed This Encounter   Procedures   • Diet Order Diet: Consistent CHO (Diabetic)     " Standing Status:   Standing     Number of Occurrences:   1     Order Specific Question:   Diet:     Answer:   Consistent CHO (Diabetic) [4]       ACTIVITY  As tolerated.  Weight bearing as tolerated    CONSULTATIONS  Cardiology    PROCEDURES  None    LABORATORY  Lab Results   Component Value Date    SODIUM 136 05/15/2021    POTASSIUM 3.7 05/15/2021    CHLORIDE 100 05/15/2021    CO2 29 05/15/2021    GLUCOSE 95 05/15/2021    BUN 34 (H) 05/15/2021    CREATININE 1.08 05/15/2021        Lab Results   Component Value Date    WBC 5.9 05/15/2021    HEMOGLOBIN 11.9 (L) 05/15/2021    HEMATOCRIT 38.3 05/15/2021    PLATELETCT 293 05/15/2021        Total time of the discharge process exceeds 38 minutes.

## 2021-05-15 NOTE — PROGRESS NOTES
Bedside report received from Alivia ROBERTSON. Patient is in bed and stable. Saturating >90% 4LNC, A&Ox4. Bed alarm is on. Bed locked and in lowest position, upper side rails up, call light in reach, whiteboard updated. POC discussed and pt verbalizes understanding.

## 2021-05-15 NOTE — PROGRESS NOTES
Meds to beds delivered at bedside, discharge orders in pending oxygen delivery, patient and patient's  updated.

## 2021-05-15 NOTE — CARE PLAN
The patient is Stable - Low risk of patient condition declining or worsening    Shift Goals  Clinical Goals: Get oxygen delivered   Patient Goals: Go home    Progress made toward(s) clinical / shift goals:  Face to face has been placed by MD yesterday afternoon. Case management to work on patient choice and have oxygen approved and delivered. Patient is on continuous pulse ox, after ambulation, O2 was at 92% on 1L.     Patient is not progressing towards the following goals:patient complains of headache, tylenol given per MAR and patient's request, will continue to monitor.     Problem: Knowledge Deficit - Standard  Goal: Patient and family/care givers will demonstrate understanding of plan of care, disease process/condition, diagnostic tests and medications  Outcome: Progressing   Discussed plan of care including oxygen monitoring and medications administered. Allowed time for questions, patient agreed and verbalized understanding.

## 2021-05-15 NOTE — CARE PLAN
The patient is Stable - Low risk of patient condition declining or worsening    Shift Goals  Clinical Goals: Patient will mantain oxygen saturation of >90% on 2L, currently on 4LNC.  Patient Goals: Breathe better    Progress made toward(s) clinical / shift goals:  Patient is currently on 1LNC while at rest which exceeds the goal set for this shift. Patient has unlabored breathing at rest, but still experiencing dyspnea with exertion and requiring more O2 when exerting oneself.    Patient is not progressing towards the following goals: N/A patient making progress towards all set goals at this time,

## 2021-05-15 NOTE — PROGRESS NOTES
Report received from Dayday ROBERTSON. Plan of care discussed. Patient resting comfortably in bed, denies any needs at this time. Safety precautions in place.

## 2021-05-15 NOTE — DISCHARGE PLANNING
Anticipated Discharge Disposition: Home    Action: Spoke with Kylee w/ [facility]. O2 was approved and will be out of delivery today.     Barriers to Discharge: None    Plan: D/C home when cleared

## 2021-05-15 NOTE — DISCHARGE PLANNING
Anticipated Discharge Disposition: Home with O2    Action: Met with pt at bedside to obtain choice for DME. Pt states she's been on service with [facility] in the past and would like to use them again.    Choice form completed and faxed to [facility].     Barriers to Discharge: DME acceptance/ delivery    Plan: F/U with [facility]

## 2021-05-15 NOTE — FACE TO FACE
"Face to Face Note  -  Durable Medical Equipment    Corazon Andrews M.D. - NPI: [id number]  I certify that this patient is under my care and that they had a durable medical equipment(DME)face to face encounter by myself that meets the physician DME face-to-face encounter requirements with this patient on:    Date of encounter:   Patient:                    MRN:                       YOB: 2022  Donna Isaac  [id number]  1965     The encounter with the patient was in whole, or in part, for the following medical condition, which is the primary reason for durable medical equipment:  CHF    I certify that, based on my findings, the following durable medical equipment is medically necessary:  Oxygen.    HOME O2 Saturation Measurements:(Values must be present for Home Oxygen orders)  Room air sat at rest: 87  Room air sat with amb: 83  With liters of O2: 1, O2 sat at rest with O2: 92  With Liters of O2: 1, O2 sat with amb with O2 : 85  Is the patient mobile?: Yes    My Clinical findings support the need for the above equipment due to:  Hypoxia    Supporting Symptoms: The patient requires supplemental oxygen, as the following interventions have been tried with limited or no improvement: \"Ambulation with oximetry    If patient feels more short of breath, they can go up to 6 liters per minute and contact healthcare provider.  "

## 2021-05-20 ENCOUNTER — TELEMEDICINE (OUTPATIENT)
Dept: CARDIOLOGY | Facility: MEDICAL CENTER | Age: 56
End: 2021-05-20
Payer: MEDICARE

## 2021-05-20 VITALS
HEIGHT: 64 IN | SYSTOLIC BLOOD PRESSURE: 112 MMHG | OXYGEN SATURATION: 100 % | DIASTOLIC BLOOD PRESSURE: 82 MMHG | BODY MASS INDEX: 37.56 KG/M2 | HEART RATE: 88 BPM | WEIGHT: 220 LBS

## 2021-05-20 DIAGNOSIS — I71.21 ASCENDING AORTIC ANEURYSM (HCC): ICD-10-CM

## 2021-05-20 DIAGNOSIS — I42.0 DILATED CARDIOMYOPATHY (HCC): ICD-10-CM

## 2021-05-20 DIAGNOSIS — R09.89 LABILE HYPERTENSION: ICD-10-CM

## 2021-05-20 PROCEDURE — 99214 OFFICE O/P EST MOD 30 MIN: CPT | Mod: 95 | Performed by: INTERNAL MEDICINE

## 2021-05-20 ASSESSMENT — FIBROSIS 4 INDEX: FIB4 SCORE: 1.15
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
## 2021-05-21 ENCOUNTER — APPOINTMENT (OUTPATIENT)
Dept: RADIOLOGY | Facility: MEDICAL CENTER | Age: 56
DRG: 645 | End: 2021-05-21
Attending: EMERGENCY MEDICINE
Payer: MEDICARE

## 2021-05-21 ENCOUNTER — HOSPITAL ENCOUNTER (INPATIENT)
Facility: MEDICAL CENTER | Age: 56
LOS: 2 days | DRG: 645 | End: 2021-05-23
Attending: EMERGENCY MEDICINE | Admitting: INTERNAL MEDICINE
Payer: MEDICARE

## 2021-05-21 DIAGNOSIS — I95.9 HYPOTENSION, UNSPECIFIED HYPOTENSION TYPE: ICD-10-CM

## 2021-05-21 DIAGNOSIS — R55 SYNCOPE, UNSPECIFIED SYNCOPE TYPE: ICD-10-CM

## 2021-05-21 PROBLEM — J32.9 CHRONIC SINUSITIS: Status: ACTIVE | Noted: 2021-05-21

## 2021-05-21 LAB
ABO GROUP BLD: NORMAL
ALBUMIN SERPL BCP-MCNC: 2.4 G/DL (ref 3.2–4.9)
ALBUMIN/GLOB SERPL: 1.2 G/DL
ALP SERPL-CCNC: 218 U/L (ref 30–99)
ALT SERPL-CCNC: 133 U/L (ref 2–50)
ANION GAP SERPL CALC-SCNC: 9 MMOL/L (ref 7–16)
APPEARANCE UR: CLEAR
AST SERPL-CCNC: 148 U/L (ref 12–45)
BASOPHILS # BLD AUTO: 0.3 % (ref 0–1.8)
BASOPHILS # BLD: 0.02 K/UL (ref 0–0.12)
BILIRUB SERPL-MCNC: 0.9 MG/DL (ref 0.1–1.5)
BILIRUB UR QL STRIP.AUTO: NEGATIVE
BLD GP AB SCN SERPL QL: NORMAL
BUN SERPL-MCNC: 32 MG/DL (ref 8–22)
CALCIUM SERPL-MCNC: 8.5 MG/DL (ref 8.4–10.2)
CHLORIDE SERPL-SCNC: 103 MMOL/L (ref 96–112)
CO2 SERPL-SCNC: 28 MMOL/L (ref 20–33)
COLOR UR: YELLOW
CREAT SERPL-MCNC: 1.36 MG/DL (ref 0.5–1.4)
EKG IMPRESSION: NORMAL
EOSINOPHIL # BLD AUTO: 0.22 K/UL (ref 0–0.51)
EOSINOPHIL NFR BLD: 3.5 % (ref 0–6.9)
ERYTHROCYTE [DISTWIDTH] IN BLOOD BY AUTOMATED COUNT: 59.9 FL (ref 35.9–50)
GLOBULIN SER CALC-MCNC: 2 G/DL (ref 1.9–3.5)
GLUCOSE SERPL-MCNC: 113 MG/DL (ref 65–99)
GLUCOSE UR STRIP.AUTO-MCNC: NEGATIVE MG/DL
HCT VFR BLD AUTO: 37.7 % (ref 37–47)
HGB BLD-MCNC: 12 G/DL (ref 12–16)
IMM GRANULOCYTES # BLD AUTO: 0.04 K/UL (ref 0–0.11)
IMM GRANULOCYTES NFR BLD AUTO: 0.6 % (ref 0–0.9)
KETONES UR STRIP.AUTO-MCNC: NEGATIVE MG/DL
LACTATE BLD-SCNC: 1.9 MMOL/L (ref 0.5–2)
LACTATE BLD-SCNC: 2 MMOL/L (ref 0.5–2)
LACTATE BLD-SCNC: 2.3 MMOL/L (ref 0.5–2)
LEUKOCYTE ESTERASE UR QL STRIP.AUTO: NEGATIVE
LYMPHOCYTES # BLD AUTO: 0.79 K/UL (ref 1–4.8)
LYMPHOCYTES NFR BLD: 12.7 % (ref 22–41)
MAGNESIUM SERPL-MCNC: 1.7 MG/DL (ref 1.5–2.5)
MCH RBC QN AUTO: 30.6 PG (ref 27–33)
MCHC RBC AUTO-ENTMCNC: 31.8 G/DL (ref 33.6–35)
MCV RBC AUTO: 96.2 FL (ref 81.4–97.8)
MICRO URNS: NORMAL
MONOCYTES # BLD AUTO: 1.02 K/UL (ref 0–0.85)
MONOCYTES NFR BLD AUTO: 16.4 % (ref 0–13.4)
NEUTROPHILS # BLD AUTO: 4.14 K/UL (ref 2–7.15)
NEUTROPHILS NFR BLD: 66.5 % (ref 44–72)
NITRITE UR QL STRIP.AUTO: NEGATIVE
NRBC # BLD AUTO: 0 K/UL
NRBC BLD-RTO: 0 /100 WBC
PH UR STRIP.AUTO: 6 [PH] (ref 5–8)
PLATELET # BLD AUTO: 145 K/UL (ref 164–446)
PMV BLD AUTO: 12.2 FL (ref 9–12.9)
POTASSIUM SERPL-SCNC: 5.3 MMOL/L (ref 3.6–5.5)
PROT SERPL-MCNC: 4.4 G/DL (ref 6–8.2)
PROT UR QL STRIP: NEGATIVE MG/DL
RBC # BLD AUTO: 3.92 M/UL (ref 4.2–5.4)
RBC UR QL AUTO: NEGATIVE
RH BLD: NORMAL
SODIUM SERPL-SCNC: 140 MMOL/L (ref 135–145)
SP GR UR STRIP.AUTO: <=1.005
TROPONIN T SERPL-MCNC: 21 NG/L (ref 6–19)
WBC # BLD AUTO: 6.2 K/UL (ref 4.8–10.8)

## 2021-05-21 PROCEDURE — 99223 1ST HOSP IP/OBS HIGH 75: CPT | Mod: AI | Performed by: INTERNAL MEDICINE

## 2021-05-21 PROCEDURE — A9270 NON-COVERED ITEM OR SERVICE: HCPCS | Performed by: INTERNAL MEDICINE

## 2021-05-21 PROCEDURE — 99285 EMERGENCY DEPT VISIT HI MDM: CPT

## 2021-05-21 PROCEDURE — 700111 HCHG RX REV CODE 636 W/ 250 OVERRIDE (IP): Performed by: EMERGENCY MEDICINE

## 2021-05-21 PROCEDURE — 700105 HCHG RX REV CODE 258: Performed by: EMERGENCY MEDICINE

## 2021-05-21 PROCEDURE — 700102 HCHG RX REV CODE 250 W/ 637 OVERRIDE(OP): Performed by: INTERNAL MEDICINE

## 2021-05-21 PROCEDURE — 86900 BLOOD TYPING SEROLOGIC ABO: CPT

## 2021-05-21 PROCEDURE — 84484 ASSAY OF TROPONIN QUANT: CPT

## 2021-05-21 PROCEDURE — 83735 ASSAY OF MAGNESIUM: CPT

## 2021-05-21 PROCEDURE — 87040 BLOOD CULTURE FOR BACTERIA: CPT

## 2021-05-21 PROCEDURE — 700105 HCHG RX REV CODE 258: Performed by: INTERNAL MEDICINE

## 2021-05-21 PROCEDURE — 80053 COMPREHEN METABOLIC PANEL: CPT

## 2021-05-21 PROCEDURE — 85025 COMPLETE CBC W/AUTO DIFF WBC: CPT

## 2021-05-21 PROCEDURE — 71045 X-RAY EXAM CHEST 1 VIEW: CPT

## 2021-05-21 PROCEDURE — 770020 HCHG ROOM/CARE - TELE (206)

## 2021-05-21 PROCEDURE — 81003 URINALYSIS AUTO W/O SCOPE: CPT

## 2021-05-21 PROCEDURE — U0005 INFEC AGEN DETEC AMPLI PROBE: HCPCS

## 2021-05-21 PROCEDURE — 93005 ELECTROCARDIOGRAM TRACING: CPT | Performed by: EMERGENCY MEDICINE

## 2021-05-21 PROCEDURE — 86850 RBC ANTIBODY SCREEN: CPT

## 2021-05-21 PROCEDURE — 83605 ASSAY OF LACTIC ACID: CPT

## 2021-05-21 PROCEDURE — 87086 URINE CULTURE/COLONY COUNT: CPT

## 2021-05-21 PROCEDURE — U0003 INFECTIOUS AGENT DETECTION BY NUCLEIC ACID (DNA OR RNA); SEVERE ACUTE RESPIRATORY SYNDROME CORONAVIRUS 2 (SARS-COV-2) (CORONAVIRUS DISEASE [COVID-19]), AMPLIFIED PROBE TECHNIQUE, MAKING USE OF HIGH THROUGHPUT TECHNOLOGIES AS DESCRIBED BY CMS-2020-01-R: HCPCS

## 2021-05-21 PROCEDURE — 700111 HCHG RX REV CODE 636 W/ 250 OVERRIDE (IP): Performed by: INTERNAL MEDICINE

## 2021-05-21 PROCEDURE — 36415 COLL VENOUS BLD VENIPUNCTURE: CPT

## 2021-05-21 PROCEDURE — 96374 THER/PROPH/DIAG INJ IV PUSH: CPT

## 2021-05-21 PROCEDURE — 86901 BLOOD TYPING SEROLOGIC RH(D): CPT

## 2021-05-21 RX ORDER — DULOXETIN HYDROCHLORIDE 30 MG/1
60 CAPSULE, DELAYED RELEASE ORAL
Status: DISCONTINUED | OUTPATIENT
Start: 2021-05-21 | End: 2021-05-23 | Stop reason: HOSPADM

## 2021-05-21 RX ORDER — GABAPENTIN 400 MG/1
400 CAPSULE ORAL 3 TIMES DAILY
Status: DISCONTINUED | OUTPATIENT
Start: 2021-05-21 | End: 2021-05-23 | Stop reason: HOSPADM

## 2021-05-21 RX ORDER — SODIUM CHLORIDE 9 MG/ML
INJECTION, SOLUTION INTRAVENOUS CONTINUOUS
Status: DISCONTINUED | OUTPATIENT
Start: 2021-05-21 | End: 2021-05-23 | Stop reason: HOSPADM

## 2021-05-21 RX ORDER — DEXAMETHASONE SODIUM PHOSPHATE 4 MG/ML
10 INJECTION, SOLUTION INTRA-ARTICULAR; INTRALESIONAL; INTRAMUSCULAR; INTRAVENOUS; SOFT TISSUE ONCE
Status: COMPLETED | OUTPATIENT
Start: 2021-05-21 | End: 2021-05-21

## 2021-05-21 RX ORDER — LEVOTHYROXINE SODIUM 0.05 MG/1
75 TABLET ORAL EVERY EVENING
Status: DISCONTINUED | OUTPATIENT
Start: 2021-05-21 | End: 2021-05-23 | Stop reason: HOSPADM

## 2021-05-21 RX ORDER — AMITRIPTYLINE HYDROCHLORIDE 10 MG/1
10 TABLET, FILM COATED ORAL EVERY EVENING
Status: DISCONTINUED | OUTPATIENT
Start: 2021-05-21 | End: 2021-05-23 | Stop reason: HOSPADM

## 2021-05-21 RX ORDER — MEROPENEM 1 G/1
1 INJECTION, POWDER, FOR SOLUTION INTRAVENOUS EVERY 8 HOURS
Status: DISCONTINUED | OUTPATIENT
Start: 2021-05-21 | End: 2021-05-21

## 2021-05-21 RX ORDER — ESTRADIOL 1 MG/1
0.5 TABLET ORAL EVERY MORNING
Status: DISCONTINUED | OUTPATIENT
Start: 2021-05-22 | End: 2021-05-23 | Stop reason: HOSPADM

## 2021-05-21 RX ORDER — CARVEDILOL 6.25 MG/1
12.5 TABLET ORAL 2 TIMES DAILY WITH MEALS
Status: DISCONTINUED | OUTPATIENT
Start: 2021-05-21 | End: 2021-05-23 | Stop reason: HOSPADM

## 2021-05-21 RX ORDER — SODIUM CHLORIDE, SODIUM LACTATE, POTASSIUM CHLORIDE, CALCIUM CHLORIDE 600; 310; 30; 20 MG/100ML; MG/100ML; MG/100ML; MG/100ML
1000 INJECTION, SOLUTION INTRAVENOUS ONCE
Status: COMPLETED | OUTPATIENT
Start: 2021-05-21 | End: 2021-05-21

## 2021-05-21 RX ORDER — ALBUTEROL SULFATE 90 UG/1
2 AEROSOL, METERED RESPIRATORY (INHALATION)
Status: DISCONTINUED | OUTPATIENT
Start: 2021-05-21 | End: 2021-05-23 | Stop reason: HOSPADM

## 2021-05-21 RX ADMIN — LEVOTHYROXINE SODIUM 75 MCG: 0.05 TABLET ORAL at 20:58

## 2021-05-21 RX ADMIN — AMITRIPTYLINE HYDROCHLORIDE 10 MG: 10 TABLET, FILM COATED ORAL at 20:57

## 2021-05-21 RX ADMIN — MEROPENEM 500 MG: 500 INJECTION, POWDER, FOR SOLUTION INTRAVENOUS at 22:06

## 2021-05-21 RX ADMIN — DULOXETINE HYDROCHLORIDE 60 MG: 30 CAPSULE, DELAYED RELEASE ORAL at 20:57

## 2021-05-21 RX ADMIN — DEXAMETHASONE SODIUM PHOSPHATE 10 MG: 4 INJECTION, SOLUTION INTRA-ARTICULAR; INTRALESIONAL; INTRAMUSCULAR; INTRAVENOUS; SOFT TISSUE at 16:36

## 2021-05-21 RX ADMIN — GABAPENTIN 400 MG: 400 CAPSULE ORAL at 22:06

## 2021-05-21 RX ADMIN — SODIUM CHLORIDE, POTASSIUM CHLORIDE, SODIUM LACTATE AND CALCIUM CHLORIDE 1000 ML: 600; 310; 30; 20 INJECTION, SOLUTION INTRAVENOUS at 16:36

## 2021-05-21 RX ADMIN — SODIUM CHLORIDE: 9 INJECTION, SOLUTION INTRAVENOUS at 20:58

## 2021-05-21 RX ADMIN — APIXABAN 5 MG: 5 TABLET, FILM COATED ORAL at 20:57

## 2021-05-21 ASSESSMENT — ENCOUNTER SYMPTOMS
NAUSEA: 0
PHOTOPHOBIA: 0
PALPITATIONS: 0
CHILLS: 0
SORE THROAT: 0
MYALGIAS: 0
HEADACHES: 0
WHEEZING: 0
COUGH: 0
BLURRED VISION: 0
HEMOPTYSIS: 0
BRUISES/BLEEDS EASILY: 0
SPUTUM PRODUCTION: 0
HEARTBURN: 0
CLAUDICATION: 0
DOUBLE VISION: 0
LOSS OF CONSCIOUSNESS: 1
WEAKNESS: 1
ABDOMINAL PAIN: 0
FLANK PAIN: 0
DIZZINESS: 1
BACK PAIN: 0
SHORTNESS OF BREATH: 0
DEPRESSION: 0
BLOOD IN STOOL: 0
EYE DISCHARGE: 0
SENSORY CHANGE: 0
SPEECH CHANGE: 0
DIARRHEA: 0
ORTHOPNEA: 0
FEVER: 0
VOMITING: 0

## 2021-05-21 ASSESSMENT — COGNITIVE AND FUNCTIONAL STATUS - GENERAL
WALKING IN HOSPITAL ROOM: A LITTLE
SUGGESTED CMS G CODE MODIFIER DAILY ACTIVITY: CI
CLIMB 3 TO 5 STEPS WITH RAILING: A LOT
MOVING TO AND FROM BED TO CHAIR: A LOT
DAILY ACTIVITIY SCORE: 23
TOILETING: A LITTLE
STANDING UP FROM CHAIR USING ARMS: A LITTLE

## 2021-05-21 ASSESSMENT — PAIN DESCRIPTION - PAIN TYPE: TYPE: ACUTE PAIN

## 2021-05-21 ASSESSMENT — LIFESTYLE VARIABLES
ALCOHOL_USE: NO
HOW MANY TIMES IN THE PAST YEAR HAVE YOU HAD 5 OR MORE DRINKS IN A DAY: 0
AVERAGE NUMBER OF DAYS PER WEEK YOU HAVE A DRINK CONTAINING ALCOHOL: 0
ON A TYPICAL DAY WHEN YOU DRINK ALCOHOL HOW MANY DRINKS DO YOU HAVE: 0
CONSUMPTION TOTAL: INCOMPLETE

## 2021-05-21 ASSESSMENT — COPD QUESTIONNAIRES
DO YOU EVER COUGH UP ANY MUCUS OR PHLEGM?: NO/ONLY WITH OCCASIONAL COLDS OR INFECTIONS
COPD SCREENING SCORE: 3
DURING THE PAST 4 WEEKS HOW MUCH DID YOU FEEL SHORT OF BREATH: SOME OF THE TIME
HAVE YOU SMOKED AT LEAST 100 CIGARETTES IN YOUR ENTIRE LIFE: NO/DON'T KNOW

## 2021-05-21 ASSESSMENT — FIBROSIS 4 INDEX
FIB4 SCORE: 1.15
FIB4 SCORE: 4.96

## 2021-05-21 NOTE — ED NOTES
"Med rec complete per pt and family at bedside  Interviewed pt with family at bedside   Allergies reviewed and updated.    Per family pt has \"probably has until end of month\" for Merrem and Xerava 5 week courses.  "
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
## 2021-05-21 NOTE — ED NOTES
While transferring patient from her wheel chair up to the [facility] patient had a syncopal episode and called for help to the bedside.  Placed patient on a cardiac monitor, auto bp and pulse ox. Witnessed syncopal episode lasted about 30 seconds.

## 2021-05-21 NOTE — ED NOTES
Unable to complete med rec at this time.   Pt recently discharged 5/15/21.  Med rec last completed 5/10/21.

## 2021-05-21 NOTE — ED TRIAGE NOTES
55 yr old female to triage with her  patient complain of bilateral leg pain R>L no obvious swelling recently discharge on Saturday. Patient had two witnessed syncopal episode by her  on Monday and Wednesday   Patient had a virtual visit with her PCP and cardiologist.for possible rehab placement

## 2021-05-22 LAB
ANION GAP SERPL CALC-SCNC: 9 MMOL/L (ref 7–16)
BUN SERPL-MCNC: 30 MG/DL (ref 8–22)
CALCIUM SERPL-MCNC: 8.5 MG/DL (ref 8.4–10.2)
CHLORIDE SERPL-SCNC: 100 MMOL/L (ref 96–112)
CO2 SERPL-SCNC: 26 MMOL/L (ref 20–33)
CREAT SERPL-MCNC: 1.21 MG/DL (ref 0.5–1.4)
ERYTHROCYTE [DISTWIDTH] IN BLOOD BY AUTOMATED COUNT: 60 FL (ref 35.9–50)
GLUCOSE SERPL-MCNC: 145 MG/DL (ref 65–99)
HCT VFR BLD AUTO: 38.1 % (ref 37–47)
HGB BLD-MCNC: 11.9 G/DL (ref 12–16)
MCH RBC QN AUTO: 29.9 PG (ref 27–33)
MCHC RBC AUTO-ENTMCNC: 31.2 G/DL (ref 33.6–35)
MCV RBC AUTO: 95.7 FL (ref 81.4–97.8)
PLATELET # BLD AUTO: 137 K/UL (ref 164–446)
PMV BLD AUTO: 13.1 FL (ref 9–12.9)
POTASSIUM SERPL-SCNC: 5.6 MMOL/L (ref 3.6–5.5)
RBC # BLD AUTO: 3.98 M/UL (ref 4.2–5.4)
SARS-COV-2 RNA RESP QL NAA+PROBE: NOTDETECTED
SODIUM SERPL-SCNC: 135 MMOL/L (ref 135–145)
SPECIMEN SOURCE: NORMAL
WBC # BLD AUTO: 6.2 K/UL (ref 4.8–10.8)

## 2021-05-22 PROCEDURE — 97162 PT EVAL MOD COMPLEX 30 MIN: CPT

## 2021-05-22 PROCEDURE — 99233 SBSQ HOSP IP/OBS HIGH 50: CPT | Performed by: HOSPITALIST

## 2021-05-22 PROCEDURE — 80048 BASIC METABOLIC PNL TOTAL CA: CPT

## 2021-05-22 PROCEDURE — 97165 OT EVAL LOW COMPLEX 30 MIN: CPT

## 2021-05-22 PROCEDURE — 700105 HCHG RX REV CODE 258: Performed by: INTERNAL MEDICINE

## 2021-05-22 PROCEDURE — 770020 HCHG ROOM/CARE - TELE (206)

## 2021-05-22 PROCEDURE — 85027 COMPLETE CBC AUTOMATED: CPT

## 2021-05-22 PROCEDURE — A9270 NON-COVERED ITEM OR SERVICE: HCPCS | Performed by: INTERNAL MEDICINE

## 2021-05-22 PROCEDURE — 700111 HCHG RX REV CODE 636 W/ 250 OVERRIDE (IP): Performed by: INTERNAL MEDICINE

## 2021-05-22 PROCEDURE — 700102 HCHG RX REV CODE 250 W/ 637 OVERRIDE(OP): Performed by: INTERNAL MEDICINE

## 2021-05-22 RX ADMIN — LEVOTHYROXINE SODIUM 75 MCG: 0.05 TABLET ORAL at 21:23

## 2021-05-22 RX ADMIN — APIXABAN 5 MG: 5 TABLET, FILM COATED ORAL at 17:56

## 2021-05-22 RX ADMIN — HYDROCORTISONE SODIUM SUCCINATE 50 MG: 100 INJECTION, POWDER, FOR SOLUTION INTRAMUSCULAR; INTRAVENOUS at 06:29

## 2021-05-22 RX ADMIN — HYDROCORTISONE SODIUM SUCCINATE 50 MG: 100 INJECTION, POWDER, FOR SOLUTION INTRAMUSCULAR; INTRAVENOUS at 17:56

## 2021-05-22 RX ADMIN — HYDROCORTISONE SODIUM SUCCINATE 50 MG: 100 INJECTION, POWDER, FOR SOLUTION INTRAMUSCULAR; INTRAVENOUS at 23:37

## 2021-05-22 RX ADMIN — ESTRADIOL 0.5 MG: 1 TABLET ORAL at 06:27

## 2021-05-22 RX ADMIN — DULOXETINE HYDROCHLORIDE 60 MG: 30 CAPSULE, DELAYED RELEASE ORAL at 21:23

## 2021-05-22 RX ADMIN — MEROPENEM 500 MG: 500 INJECTION, POWDER, FOR SOLUTION INTRAVENOUS at 06:34

## 2021-05-22 RX ADMIN — AMITRIPTYLINE HYDROCHLORIDE 10 MG: 10 TABLET, FILM COATED ORAL at 17:56

## 2021-05-22 RX ADMIN — MEROPENEM 500 MG: 500 INJECTION, POWDER, FOR SOLUTION INTRAVENOUS at 14:58

## 2021-05-22 RX ADMIN — HYDROCORTISONE SODIUM SUCCINATE 50 MG: 100 INJECTION, POWDER, FOR SOLUTION INTRAMUSCULAR; INTRAVENOUS at 12:14

## 2021-05-22 RX ADMIN — GABAPENTIN 400 MG: 400 CAPSULE ORAL at 14:58

## 2021-05-22 RX ADMIN — GABAPENTIN 400 MG: 400 CAPSULE ORAL at 21:23

## 2021-05-22 RX ADMIN — SODIUM CHLORIDE: 9 INJECTION, SOLUTION INTRAVENOUS at 06:27

## 2021-05-22 RX ADMIN — HYDROCORTISONE SODIUM SUCCINATE 50 MG: 100 INJECTION, POWDER, FOR SOLUTION INTRAMUSCULAR; INTRAVENOUS at 00:01

## 2021-05-22 RX ADMIN — MEROPENEM 500 MG: 500 INJECTION, POWDER, FOR SOLUTION INTRAVENOUS at 21:23

## 2021-05-22 RX ADMIN — APIXABAN 5 MG: 5 TABLET, FILM COATED ORAL at 06:27

## 2021-05-22 RX ADMIN — GABAPENTIN 400 MG: 400 CAPSULE ORAL at 06:33

## 2021-05-22 ASSESSMENT — FIBROSIS 4 INDEX: FIB4 SCORE: 5.25

## 2021-05-22 ASSESSMENT — COGNITIVE AND FUNCTIONAL STATUS - GENERAL
CLIMB 3 TO 5 STEPS WITH RAILING: A LITTLE
TOILETING: A LITTLE
MOBILITY SCORE: 17
CLIMB 3 TO 5 STEPS WITH RAILING: A LOT
SUGGESTED CMS G CODE MODIFIER DAILY ACTIVITY: CI
MOVING FROM LYING ON BACK TO SITTING ON SIDE OF FLAT BED: A LITTLE
MOBILITY SCORE: 23
MOVING TO AND FROM BED TO CHAIR: A LOT
DAILY ACTIVITIY SCORE: 23
SUGGESTED CMS G CODE MODIFIER MOBILITY: CI
DAILY ACTIVITIY SCORE: 23
SUGGESTED CMS G CODE MODIFIER MOBILITY: CK
WALKING IN HOSPITAL ROOM: A LITTLE
STANDING UP FROM CHAIR USING ARMS: A LITTLE
HELP NEEDED FOR BATHING: A LITTLE
SUGGESTED CMS G CODE MODIFIER DAILY ACTIVITY: CI

## 2021-05-22 ASSESSMENT — GAIT ASSESSMENTS
DEVIATION: STEP TO
GAIT LEVEL OF ASSIST: SUPERVISED
DISTANCE (FEET): 200
ASSISTIVE DEVICE: FRONT WHEEL WALKER

## 2021-05-22 ASSESSMENT — ACTIVITIES OF DAILY LIVING (ADL): TOILETING: INDEPENDENT

## 2021-05-22 NOTE — ED NOTES
COVID swab collected and sent to lab    Assist patient up on a bed pan, patient cleaned for a clean catch urine sample. Urine sample obtained and sent to lab    Purewick placed after she voided     at bedside for blood cultures (2nd set) and COD

## 2021-05-22 NOTE — ASSESSMENT & PLAN NOTE
Stress dose steroids. Current decompensation likely secondary to ongoing sinusitis infection.  Patient's home medications need to be uptitrated prior to discharge  She has close outpatient follow-up with her endocrinologist Dr. Jeff  There is some concern for occasional compliance in this patient based on previous note
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
## 2021-05-22 NOTE — PROGRESS NOTES
2 RN skin check completed with Cici ROBERTSON.  Devices in place (RAC PIV, PICC LUE, telemetry box).  Skin assessed under devices (intact).  Confirmed pressure ulcers found on (none).  New potential pressure ulcers noted on (left ear erythema). Wound consult placed (not needed).  The following interventions in place: pt turns self and has pillows for support/positioning.    Additional: Skin WDL except for:  Erythema left ear where glasses and O2 tubing rest, gray foam on tubing to ease pressure. Small bruise on left upper back. Small bruises on bilateral shins. Scabbing on bilateral wrists and small closed cuts on tips of fingers open to air. Cut on left heel that is painful to touch and covered with heel mepilex. +2 BLLE edema.

## 2021-05-22 NOTE — PROGRESS NOTES
Pt arrived to unit via gurney. Tele monitor applied, vitals taken. Pt assessed. A&O x4. Admit profile and med rec complete. Discussed POC with pt, including IV fluids, steroids, and antibiotics. Welcome folder provided and discussed. Communication board filled out. Questions and concerns addressed, verbalized understanding. Fall precautions in place. Pt demonstrates ability to use call light appropriately. Pt left in lowest position. Bed locked, bed alarm on.

## 2021-05-22 NOTE — ASSESSMENT & PLAN NOTE
Patient with episodes of syncope without evidence of volume depletion. Likely secondary to her newly diagnosed adrenal insufficiency.  Start patient on stress dose steroids and adjust Cortef prior to discharge  She follows with Dr. Jeff of endocrinology  Previous echocardiogram was within normal limits  Continue to monitor on telemetry

## 2021-05-22 NOTE — PROGRESS NOTES
Attempted to draw labs off of patients single lumen LUE PICC without success. PICC flushes but does not return blood which it did earlier for another blood draw for LA at 2123. Spoke to Michelle from lab who jairo patient's morning CBC and BMP.

## 2021-05-22 NOTE — PROGRESS NOTES
Report received. Assumed care of patient. Patient is resting in bed eating breakfast. Patient states some occasional dizziness. Looking forward to working with PT/OT today. All needs are currently met. All safety precautions in place. Will continue to monitor.

## 2021-05-22 NOTE — CARE PLAN
The patient is Watcher - Medium risk of patient condition declining or worsening    Shift Goals  Clinical Goals: Patient to maintain MAP >65 throughout shift.    Progress made toward(s) clinical / shift goals:  Patient's MAP has remained over 65 throughout shift. BP still is low 90s/50s. Patient A&Ox4, but does have some dizziness while ambulating. Will continue with the given steroid dose adjustments for patient's adrenal insufficiency to improve BP.    Patient is not progressing towards the following goals:N/A

## 2021-05-22 NOTE — PROGRESS NOTES
Spoke to Dr. Macias regarding patient having home medication Xerava brought in and needing order so medication can be verified by pharmacy. Order received and spoke to pharmacist Julio Cesar Yoder who verified the medication. This medication has labels of being verified but do not have barcodes. This patient supplied medication was placed in the medication refrigerator.

## 2021-05-22 NOTE — PROGRESS NOTES
Bedside report received from Danita ROBERTSON. Patient is in bed and stable. Bed alarm is on. Patient's  bedside. Bed locked and in lowest position, upper side rails up, call light in reach, whiteboard updated. POC discussed and pt verbalizes understanding.

## 2021-05-22 NOTE — PROGRESS NOTES
[facility] Medicine Daily Progress Note    Date of Service  5/22/2021    Chief Complaint  55 y.o. female admitted 5/21/2021 with 2 episodes of syncope    Hospital Course  No notes on file    Interval Problem Update  Patient was admitted for telemetry monitoring given her syncope, she denies any further episodes, cardiac telemetry thus far is unremarkable.  No chest pain.  No dyspnea.  She reports adherence with her chronic steroid medications for for adrenal insufficiency.    Consultants/Specialty  None indicated this juncture.    Code Status  Full Code    Disposition  Likely to home in morning if no acute overnight events.    Review of Systems  All systems reviewed and negative except as noted per above.    Physical Exam  Temp:  [36.2 °C (97.1 °F)-36.5 °C (97.7 °F)] 36.3 °C (97.3 °F)  Pulse:  [53-75] 75  Resp:  [16-20] 18  BP: ()/(38-90) 106/69  SpO2:  [95 %-100 %] 100 %    General: No acute distress  HEENT atraumatic, normocephalic, pupils equal round reactive to light  Neck: No JVD  Chest: Respirations are unlabored  Cardiac: Physiologic S1 and S2  Abdomen: Soft, nontender, nondistended  Extremities: Without clubbing, cyanosis or edema  Neuro: Cranial nerves II through XII are grossly intact.  Psych: No anxiety, judgement intact.        Current Facility-Administered Medications:   •  hydrocortisone sodium succinate PF (SOLU-CORTEF) 100 MG injection 100 mg, 100 mg, Intravenous, Once, Dany Macias M.D.  •  hydrocortisone sodium succinate PF (SOLU-CORTEF) 100 MG injection 50 mg, 50 mg, Intravenous, Q6HRS, Dany Macias M.D., 50 mg at 05/22/21 1214  •  Respiratory Therapy Consult, , Nebulization, Continuous RT, Dany Macias M.D.  •  NS infusion, , Intravenous, Continuous, Dany Macias M.D., Last Rate: 100 mL/hr at 05/22/21 0627, New Bag at 05/22/21 0627  •  amitriptyline (ELAVIL) tablet 10 mg, 10 mg, Oral, Q EVENING, Dany Macias M.D., 10 mg at 05/21/21 2057  •  apixaban (ELIQUIS) tablet 5 mg, 5 mg, Oral,  BID, Dany Macias M.D., 5 mg at 05/22/21 0627  •  carvedilol (COREG) tablet 12.5 mg, 12.5 mg, Oral, BID WITH MEALS, Dany Macias M.D.  •  DULoxetine (CYMBALTA) capsule 60 mg, 60 mg, Oral, QHS, Dany Macias M.D., 60 mg at 05/21/21 2057  •  gabapentin (NEURONTIN) capsule 400 mg, 400 mg, Oral, TID, Dany Macias M.D., 400 mg at 05/22/21 1458  •  estradiol (ESTRACE) tablet 0.5 mg, 0.5 mg, Oral, QAM, Dany Macias M.D., 0.5 mg at 05/22/21 0627  •  levothyroxine (SYNTHROID) tablet 75 mcg, 75 mcg, Oral, Q EVENING, Dany Macias M.D., 75 mcg at 05/21/21 2058  •  Eravacycline Dihydrochloride SOLR 100 mg, 100 mg, Intravenous, BID, Dany Macias M.D., 100 mg at 05/22/21 1400  •  albuterol inhaler 2 Puff, 2 Puff, Inhalation, Q4H PRN (RT), Dany Macias M.D.  •  meropenem (MERREM) 500 mg in  mL IVPB, 500 mg, Intravenous, Q8HRS, Dany Macias M.D., Stopped at 05/22/21 1528      Fluids    Intake/Output Summary (Last 24 hours) at 5/22/2021 1602  Last data filed at 5/22/2021 1528  Gross per 24 hour   Intake 3390 ml   Output 700 ml   Net 2690 ml       Laboratory  Recent Labs     05/21/21  1632 05/22/21  0300   WBC 6.2 6.2   RBC 3.92* 3.98*   HEMOGLOBIN 12.0 11.9*   HEMATOCRIT 37.7 38.1   MCV 96.2 95.7   MCH 30.6 29.9   MCHC 31.8* 31.2*   RDW 59.9* 60.0*   PLATELETCT 145* 137*   MPV 12.2 13.1*     Recent Labs     05/21/21  1632 05/22/21  0300   SODIUM 140 135   POTASSIUM 5.3 5.6*   CHLORIDE 103 100   CO2 28 26   GLUCOSE 113* 145*   BUN 32* 30*   CREATININE 1.36 1.21   CALCIUM 8.5 8.5                   Imaging  DX-CHEST-PORTABLE (1 VIEW)   Final Result      1.  No acute cardiopulmonary abnormality identified.      2.  Enlarged cardiac silhouette      3.  Left PICC line appears appropriately located           Assessment/Plan  Chronic sinusitis  Assessment & Plan  Patient on chronic therapy with Merrem which will be resumed on this admission.    Adrenal insufficiency (Valentín's disease) (HCC)- (present on admission)  Assessment  & Plan  Stress dose steroids. Current decompensation likely secondary to ongoing sinusitis infection.  Patient's home medications need to be uptitrated prior to discharge  She has close outpatient follow-up with her endocrinologist Dr. Jeff  There is some concern for occasional compliance in this patient based on previous note    Acute pulmonary embolism (HCC)- (present on admission)  Assessment & Plan  On chronic anticoagulation with apixaban    Syncope- (present on admission)  Assessment & Plan  Patient with episodes of syncope without evidence of volume depletion. Likely secondary to her newly diagnosed adrenal insufficiency.  Start patient on stress dose steroids and adjust Cortef prior to discharge  She follows with Dr. Jeff of endocrinology  Previous echocardiogram was within normal limits  Continue to monitor on telemetry    Hypothyroidism- (present on admission)  Assessment & Plan  Continue patient's home dose of levothyroxine    Depression- (present on admission)  Assessment & Plan  Continue home medications  No current homicidal or suicidal ideation at this time         VTE prophylaxis: LMWH

## 2021-05-22 NOTE — PROGRESS NOTES
Spoke to Meghan from pharmacy to verify medications are safe to give at this time that were scheduled for patient at 1845. Patient is new admit to med-tele 315-1. Meropenem switched to IVPB, hydrocortisone once dose cancelled and will be initated at 0000. All other medications safe to give at this time.

## 2021-05-22 NOTE — THERAPY
Physical Therapy   Initial Evaluation     Patient Name: Donna Isaac  Age:  55 y.o., Sex:  female  Medical Record #: [id number]  Today's Date: 5/22/2021     Precautions: (P) Fall Risk    Assessment  Patient is 55 y.o. female with a diagnosis of adrenal insufficiency. Pt lives with friends at the moment they are looking for a ground level house,Pt is mod active and ambulates with a walker.Pt is safe with bed mob,transfers and ambulation,she has no equipment needs,Pt appears safe for home    Plan    Recommend Physical Therapy for Evaluation only     DC Equipment Recommendations: (P) None  Discharge Recommendations: (P) Anticipate that the patient will have no further physical therapy needs after discharge from the hospital        05/22/21 1300   Total Time Spent   Total Time Spent (Mins) 30   Charge Group   PT Evaluation PT Evaluation Mod   Initial Contact Note    Initial Contact Note Order Received and Verified, Physical Therapy Evaluation in Progress with Full Report to Follow.   Precautions   Precautions Fall Risk   Pain 0 - 10 Group   Pain Rating Scale (NPRS) 0   Therapist Pain Assessment 0   Prior Living Situation   Prior Services None   Housing / Facility 2 Story House   Steps Into Home 2   Steps In Home 14   Equipment Owned Front-Wheel Walker   Lives with - Patient's Self Care Capacity Friends;Spouse   Prior Level of Functional Mobility   Bed Mobility Independent   Transfer Status Independent   Ambulation Independent   Distance Ambulation (Feet)   (limited community)   Assistive Devices Used Front-Wheel Walker   Stairs Required Assist   History of Falls   History of Falls Yes   Cognition    Cognition / Consciousness WDL   Level of Consciousness Alert   Passive ROM Lower Body   Passive ROM Lower Body WDL   Active ROM Lower Body    Active ROM Lower Body  WDL   Strength Lower Body   Lower Body Strength  X   Comments general weakness   Coordination Lower Body    Coordination Lower Body  WDL   Balance  Assessment   Sitting Balance (Static) Good   Sitting Balance (Dynamic) Good   Standing Balance (Static) Fair +   Standing Balance (Dynamic) Fair +   Weight Shift Sitting Good   Weight Shift Standing Good   Gait Analysis   Gait Level Of Assist Supervised   Assistive Device Front Wheel Walker   Distance (Feet) 200   # of Times Distance was Traveled 1   Deviation Step To   # of Stairs Climbed 2   Level of Assist with Stairs Minimal Assist   Weight Bearing Status full   Bed Mobility    Supine to Sit Modified Independent   Sit to Supine Modified Independent   Scooting Modified Independent   Functional Mobility   Sit to Stand Supervised   Bed, Chair, Wheelchair Transfer Supervised   Transfer Method Stand Step   How much difficulty does the patient currently have...   Turning over in bed (including adjusting bedclothes, sheets and blankets)? 4   Sitting down on and standing up from a chair with arms (e.g., wheelchair, bedside commode, etc.) 4   Moving from lying on back to sitting on the side of the bed? 4   How much help from another person does the patient currently need...   Moving to and from a bed to a chair (including a wheelchair)? 4   Need to walk in a hospital room? 4   Climbing 3-5 steps with a railing? 3   6 clicks Mobility Score 23   Activity Tolerance   Sitting Edge of Bed 10   Standing 10   Patient / Family Goals    Patient / Family Goal #1 Home   Anticipated Discharge Equipment and Recommendations   DC Equipment Recommendations None   Discharge Recommendations Anticipate that the patient will have no further physical therapy needs after discharge from the hospital   Interdisciplinary Plan of Care Collaboration   IDT Collaboration with  Nursing   Session Information   Date / Session Number  5/22   Priority 0

## 2021-05-22 NOTE — PROGRESS NOTES
Telemetry Shift Summary     Rhythm SB, SR  HR Range 52-62  Ectopy  None  Measurements .22/.10/.40              Normal Values  Rhythm SR  HR Range    Measurements 0.12-0.20 / 0.06-0.10  / 0.30-0.52

## 2021-05-23 VITALS
BODY MASS INDEX: 37.9 KG/M2 | HEART RATE: 78 BPM | OXYGEN SATURATION: 100 % | TEMPERATURE: 97.8 F | SYSTOLIC BLOOD PRESSURE: 143 MMHG | WEIGHT: 222 LBS | RESPIRATION RATE: 16 BRPM | HEIGHT: 64 IN | DIASTOLIC BLOOD PRESSURE: 100 MMHG

## 2021-05-23 LAB
BACTERIA UR CULT: NORMAL
SIGNIFICANT IND 70042: NORMAL
SITE SITE: NORMAL
SOURCE SOURCE: NORMAL

## 2021-05-23 PROCEDURE — 700102 HCHG RX REV CODE 250 W/ 637 OVERRIDE(OP): Performed by: INTERNAL MEDICINE

## 2021-05-23 PROCEDURE — 700111 HCHG RX REV CODE 636 W/ 250 OVERRIDE (IP): Performed by: INTERNAL MEDICINE

## 2021-05-23 PROCEDURE — A9270 NON-COVERED ITEM OR SERVICE: HCPCS | Performed by: INTERNAL MEDICINE

## 2021-05-23 PROCEDURE — 700105 HCHG RX REV CODE 258: Performed by: INTERNAL MEDICINE

## 2021-05-23 PROCEDURE — 99239 HOSP IP/OBS DSCHRG MGMT >30: CPT | Performed by: HOSPITALIST

## 2021-05-23 RX ORDER — HYDROCORTISONE 10 MG/1
5 TABLET ORAL DAILY
Qty: 5 TABLET | Refills: 0 | Status: ON HOLD | OUTPATIENT
Start: 2021-05-23 | End: 2021-06-03

## 2021-05-23 RX ADMIN — APIXABAN 5 MG: 5 TABLET, FILM COATED ORAL at 05:38

## 2021-05-23 RX ADMIN — GABAPENTIN 400 MG: 400 CAPSULE ORAL at 05:37

## 2021-05-23 RX ADMIN — CARVEDILOL 12.5 MG: 6.25 TABLET, FILM COATED ORAL at 08:38

## 2021-05-23 RX ADMIN — ESTRADIOL 0.5 MG: 1 TABLET ORAL at 05:37

## 2021-05-23 RX ADMIN — MEROPENEM 500 MG: 500 INJECTION, POWDER, FOR SOLUTION INTRAVENOUS at 05:38

## 2021-05-23 RX ADMIN — HYDROCORTISONE SODIUM SUCCINATE 50 MG: 100 INJECTION, POWDER, FOR SOLUTION INTRAMUSCULAR; INTRAVENOUS at 05:38

## 2021-05-23 RX ADMIN — SODIUM CHLORIDE: 9 INJECTION, SOLUTION INTRAVENOUS at 05:44

## 2021-05-23 NOTE — DISCHARGE SUMMARY
"Discharge Summary    CHIEF COMPLAINT ON ADMISSION  Chief Complaint   Patient presents with   • Leg Pain     Patient report of bilateral leg pain R>L no obvious swelling noted and passed out Monday and Wednesday    • Syncope       Reason for Admission  Leg Pain     Admission Date  5/21/2021    CODE STATUS  Prior    HPI & HOSPITAL COURSE  For full details of admission please see the H&P Dr. Macias dated 5/21/2021, briefly, \"55 y.o. female who presented 5/21/2021 with past medical history of chronic sinusitis, pulmonary embolism, obesity, and adrenal insufficiency who presents to the ED with a chief complaint of progressive weakness and syncopal episodes for the past week. She states that she was recently discharged from our institution on Saturday where she was being evaluated for shortness of breath and treated for acute asthma exacerbation. At this time she was also evaluated for adrenal insufficiency and started on steroid therapy with plan to follow-up with her endocrinologist. Of note there is some question of medication noncompliance and her endocrinologist has recently increased her dose of steroids. Once she was discharged she states that she was progressively weak and had 2 episodes of syncope which presented as dizziness when rising from a seated position. Syncopal episodes were brief lasting few minutes. Patient's  at bedside denies any rhythmic movements of her upper or lower extremities, no tongue biting, no bowel or bladder incontinence noted. Due to the persistence of her symptoms she sought consult at her institution was subsequently admitted for further evaluation management of adrenal insufficiency and need for stress dose steroids.\"    Patient's blood pressure and orthostatic hypotension improved with stress test ordered.  She had 36 hours of essentially unremarkable cardiac telemetry monitoring.  She had no further episodes of syncope.  She was stable for discharge on 5/23/2021.  She was " prescribed an additional 10 days of stress dose steroids, instructed to return if there was a recurrence of any of the symptoms which originally brought her to seek medical attention.  She is to follow-up with her outpatient endocrinologist in short order.  She is to continue taking her previously prescribed medications without change.    Therefore, she is discharged in good and stable condition to home with close outpatient follow-up.    The patient met 2-midnight criteria for an inpatient stay at the time of discharge.    Discharge Date  5/23/2021    FOLLOW UP ITEMS POST DISCHARGE  None    DISCHARGE DIAGNOSES  Active Problems:    Acute pulmonary embolism (HCC) POA: Yes    Adrenal insufficiency ([place]as[place]'s disease) (HCC) POA: Yes    Chronic sinusitis POA: Unknown    Depression POA: Yes    Hypothyroidism POA: Yes  Resolved Problems:    Syncope POA: Yes      Overview: Overview:       Likely due to hypotension      FOLLOW UP  Future Appointments   Date Time Provider Department Center   5/24/2021  3:40 PM Michael J Bloch, M.D. [facility] None   5/27/2021  [facility]   7/6/2021  9:40 AM JASPER Del Toro [facility] None   9/13/2021  8:15 AM [facility] EXAM 12 ECHO [facility]   9/20/2021  8:00 AM Eligio Torres M.D. [facility] None     No follow-up provider specified.    MEDICATIONS ON DISCHARGE     Medication List      CHANGE how you take these medications      Instructions   * hydrocortisone 5 MG Tabs  What changed: Another medication with the same name was changed. Make sure you understand how and when to take each.  Commonly known as: CORTEF   Take 1 tablet by mouth 1/2 hour after lunch.  Dose: 5 mg     * hydrocortisone 10 MG Tabs  What changed:   · how much to take  · when to take this  · additional instructions  Commonly known as: CORTEF   Take 0.5 Tablets by mouth every day for 10 days. With usual 10mg dose for a total of 15mg daily for 10 days.  Dose: 5 mg         * This list has 2  medication(s) that are the same as other medications prescribed for you. Read the directions carefully, and ask your doctor or other care provider to review them with you.            CONTINUE taking these medications      Instructions   amitriptyline 10 MG Tabs  Commonly known as: ELAVIL   TAKE 2 TABLETS BY MOUTH EVERY NIGHT AT BEDTIME, AND 1 TABLET BY MOUTH EVERY MORNING     apixaban 5mg Tabs  Commonly known as: ELIQUIS   Take 1 tablet by mouth 2 times a day for 30 days. After completion of loading dose of 10 mg po bid x 7 days, take 5 mg po bid  Dose: 5 mg     calcitonin (salmon) 200 UNIT/ACT Soln  Commonly known as: MIACALCIN   Spray 1 Spray in nose every bedtime.  Dose: 1 Spray     carvedilol 12.5 MG Tabs  Commonly known as: COREG   Take 1 tablet by mouth 2 times a day with meals.  Dose: 12.5 mg     cetirizine 10 MG Tabs  Commonly known as: ZYRTEC   Take 20 mg by mouth 2 (two) times a day.  Dose: 20 mg     colesevelam 625 MG Tabs  Commonly known as: WELCHOL   Take 625 mg by mouth 3 times a day, with meals.  Dose: 625 mg     cromolyn 100 MG/5ML solution  Commonly known as: GASTROCROM   Take 200 mg by mouth 3 times a day before meals.  Dose: 200 mg     cyanocobalamin 1000 MCG/ML Soln  Commonly known as: VITAMIN B-12   Inject 1,000 mcg into the shoulder, thigh, or buttocks every Saturday.  Dose: 1,000 mcg     Dexilant 60 MG Cpdr delayed-release capsule  Generic drug: Dexlansoprazole   Take 60 mg by mouth every morning.  Dose: 60 mg     DULoxetine 60 MG Cpep delayed-release capsule  Commonly known as: CYMBALTA   Take 60 mg by mouth every bedtime.  Dose: 60 mg     Erenumab 70 MG/ML Soaj  Commonly known as: AIMOVIG   Inject 140 mg under the skin Q30 DAYS.  Dose: 140 mg     estradiol 0.5 MG tablet  Commonly known as: ESTRACE   Take 0.5 mg by mouth every morning.  Dose: 0.5 mg     Frova 2.5 MG Tabs  Generic drug: Frovatriptan Succinate   Take 2.5 mg by mouth as needed (For migraines). MR x 1 in 1 hour if no relief  then  must wait 8 hours for another dose  Dose: 2.5 mg     furosemide 40 MG Tabs  Commonly known as: LASIX   Take 1 tablet by mouth every day.  Dose: 40 mg     gabapentin 400 MG Caps  Commonly known as: NEURONTIN   Take 1 Cap by mouth 3 times a day.  Dose: 400 mg     Jardiance 10 MG Tabs  Generic drug: Empagliflozin   Take 10 mg by mouth every bedtime.  Dose: 10 mg     levalbuterol 45 MCG/ACT inhaler  Commonly known as: XOPENEX HFA   Inhale 1-2 Puffs every four hours as needed for Shortness of Breath.  Dose: 1-2 Puff     levothyroxine 75 MCG Tabs  Commonly known as: SYNTHROID   Take 75 mcg by mouth every evening.  Dose: 75 mcg     liothyronine 25 MCG Tabs  Commonly known as: CYTOMEL   Take 25 mcg by mouth every bedtime.  Dose: 25 mcg     Magnesium 400 MG Tabs   Take 400 mg by mouth every evening.  Dose: 400 mg     meropenem 1 GM Solr  Commonly known as: MERREM   Infuse 1 g into a venous catheter every 8 hours. Pt started a 5 week course of MERREM on 4/20  Dose: 1 g     montelukast 10 MG Tabs  Commonly known as: SINGULAIR   Take 10 mg by mouth every evening.  Dose: 10 mg     multivitamin Tabs   Take 1 Tab by mouth every day.  Dose: 1 tablet     Vitamin D3 2000 UNIT Caps   Take 2,000 Units by mouth every day.  Dose: 2,000 Units     vitamin k 100 MCG tablet   Take 100 mcg by mouth every day.  Dose: 100 mcg     Voltaren 1 % Gel  Generic drug: diclofenac sodium   Apply 1 Application topically 1 time a day as needed.  Dose: 1 Application     Xerava 100 MG Solr  Generic drug: Eravacycline Dihydrochloride   Infuse 100 mg into a venous catheter 2 times a day. Pt started a 5 week course of XERAVA on 4/20  Dose: 100 mg     Zomacton 10 MG Solr  Generic drug: Somatropin   Inject 0.3 mg under the skin every bedtime.  Dose: 0.3 mg            Allergies  Allergies   Allergen Reactions   • Ancef [Cefazolin] Hives and Shortness of Breath   • Bactrim [Sulfamethoxazole W-Trimethoprim] Shortness of Breath   • Bee Venom Anaphylaxis   •  "Buprenorphine Anaphylaxis   • Buprenorphine Hives and Shortness of Breath   • Clindamycin Hives and Shortness of Breath   • Contrast Media With Iodine [Iodine] Hives and Swelling   • Doxycycline Hives and Shortness of Breath   • Econazole Anaphylaxis   • Econazole Nitrate [Ecoza] Anaphylaxis   • Flagyl  [Metronidazole] Hives and Unspecified   • Floxin Otic Anaphylaxis   • Floxin [Ofloxacin] Anaphylaxis, Shortness of Breath and Swelling     Cause throat swelling and difficulty breathing   • Gadolinium Derivatives Hives and Swelling     Throat swelling   • Hydrocodone-Acetaminophen Hives and Shortness of Breath   • Iodine Shortness of Breath and Anaphylaxis     Throat and tongue swelling with IV contrast   • Keflex Shortness of Breath   • Keflex Hives and Shortness of Breath   • Levaquin Shortness of Breath     anaphlyaxis   • Levaquin Anaphylaxis   • Levofloxacin Shortness of Breath   • Morphine Anaphylaxis   • Naloxone Hives     \"hives, SOB\"   • Naloxone Hives and Shortness of Breath   • Nitrofurantoin Shortness of Breath     ...and hives     • Nitrofurantoin Hives and Shortness of Breath   • Norco [Apap-Fd&C Yellow #10 Al Tai-Hydrocodone] Shortness of Breath     ...and hives     • Nyquil Hives and Shortness of Breath   • Oxycodone Shortness of Breath     ...and hives     • Oxycodone Hcl Hives and Shortness of Breath   • Paricalcitol Hives   • Paricalcitol Hives, Shortness of Breath and Unspecified     CHEST PAIN   • Penicillins Shortness of Breath     ...and hives     • Penicillins Hives and Shortness of Breath   • Tape Contact Dermatitis and Swelling     Blisters, clear tegaderm ok.. No steristrips.  Other reaction(s): Other, Unknown   • Tramadol Hives   • Vicks Dayquil Cold Hives and Shortness of Breath   • Ancef [Cefazolin] Hives   • Azithromycin Hives and Shortness of Breath     Pt had Hives also   • Bextra [Valdecoxib] Rash     \"Skin burn and peeling.\"   • Flagyl [Kdc:Metronidazole+Tartrazine] Hives   • " "Gadolinium Swelling and Hives   • Linezolid Rash     Rash all over body   • Nyquil Hives and Shortness of Breath     Other reaction(s): Respiratory Distress   • Other Drug Hives     \"Enconazole nitrate.\" shortness of breath and hives   • Other Misc Unspecified     Adhesive tape: blisters, skin peels off   • Clindamycin      HIVES     • Clindamycin Hcl      Other reaction(s): hives   • Codeine Shortness of Breath and Rash     Rash & SOB   • Iodinated Diagnostic Agents  [Diagnostic X-Ray Materials]    • Sulfa Drugs      Other reaction(s): Hives   • Tramadol      Rash/hives   • Tygacil [Tigecycline]      itching   • Bextra [Valdecoxib] Unspecified     Skin blisters and peels off   • Tape Unspecified     Blisters and peels off       DIET  No orders of the defined types were placed in this encounter.      ACTIVITY  As tolerated.  Weight bearing as tolerated    CONSULTATIONS  None    PROCEDURES  DX-CHEST-PORTABLE (1 VIEW)   Final Result      1.  No acute cardiopulmonary abnormality identified.      2.  Enlarged cardiac silhouette      3.  Left PICC line appears appropriately located            LABORATORY  Lab Results   Component Value Date    SODIUM 135 05/22/2021    POTASSIUM 5.6 (H) 05/22/2021    CHLORIDE 100 05/22/2021    CO2 26 05/22/2021    GLUCOSE 145 (H) 05/22/2021    BUN 30 (H) 05/22/2021    CREATININE 1.21 05/22/2021        Lab Results   Component Value Date    WBC 6.2 05/22/2021    HEMOGLOBIN 11.9 (L) 05/22/2021    HEMATOCRIT 38.1 05/22/2021    PLATELETCT 137 (L) 05/22/2021        Total time of the discharge process exceeds 32 minutes.  "

## 2021-05-23 NOTE — PROGRESS NOTES
Report received from lillie Burks awake for report.    Morning assessment done about 0830 after pt had used the restroom.  Lung sounds diminished in the bases and on her home dose of oxygen.  Pt is wanting to go home today and  only lives 5 minutes away.  Pt states she has appointments with several doctors coming up this week as they were the ones that told her to come to the ED.      Discharging Patient home per physician order.  Discharged with  to home at 1142.  Demonstrated understanding of discharge instructions, follow up appointments, home medications, prescriptions sent to [facility], and nursing care instructions for viral's disease.  Ambulating without assistance, voiding without difficulty, pain well controlled, tolerating oral medications, oxygen saturation greater than 90% , tolerating diet.   Educational handouts given and discussed.  Verbalized understanding of discharge instructions and educational handouts.  All questions answered.  Belongings with patient at time of discharge. Pt was sent home with her home antibiotics that were in the fridge.

## 2021-05-23 NOTE — PROGRESS NOTES
Telemetry Shift Summary     Rhythm: SR  HR: 66-78  Ectopy: none    Measurements: 0.20/0.10/0.44    Normal Values  Rhythm: SR  HR:   Measurements: 0.12-0.20/0.08-0.10/0.30-0.52

## 2021-05-23 NOTE — DISCHARGE INSTRUCTIONS
Discharge Instructions    Discharged to home by car with relative. Discharged via wheelchair, hospital escort: Yes.  Special equipment needed: Not Applicable    Be sure to schedule a follow-up appointment with your primary care doctor or any specialists as instructed.     Discharge Plan:   Diet Plan: Discussed  Activity Level: Discussed  Confirmed Follow up Appointment: Appointment Scheduled  Confirmed Symptoms Management: Discussed  Medication Reconciliation Updated: Yes    I understand that a diet low in cholesterol, fat, and sodium is recommended for good health. Unless I have been given specific instructions below for another diet, I accept this instruction as my diet prescription.   Other diet: Low Salt    Special Instructions:     Person's Disease    Person's disease, which is also called primary adrenal insufficiency, is a condition in which the adrenal glands do not make enough of the hormones cortisol and aldosterone.  The disease causes blood pressure to drop and causes potassium to build up to dangerous levels. If Valentín's disease is not treated, it can suddenly get worse and become life-threatening. This is called an adrenal crisis (Addisonian crisis).  What are the causes?  This condition may be caused by:  · A disease in which the body's immune system damages the adrenal glands.  · An infection of the adrenal glands.  · Bleeding (hemorrhage) in the adrenal glands.  · A tumor.  What are the signs or symptoms?  Common symptoms of this condition include:  · Severe fatigue.  · Muscle weakness.  · Loss of appetite.  · Weight loss.  · Darkening of the skin.  · Low blood pressure (hypotension).  Other symptoms include:  · Nausea or vomiting.  · Diarrhea.  · Dizziness or fainting.  · Irritability  · Depression.  · Salt cravings.  · Low blood sugar (hypoglycemia).  · Irregular or no menstrual periods.  Symptoms usually develop slowly and get worse gradually.  How is this diagnosed?  This condition may be  diagnosed based on your:  · Medical history.  · Symptoms.  · Lab test results. Lab tests include a measurement of your blood cortisol levels.  · Imaging tests results. You may have a CT scan of the adrenal glands.  How is this treated?  This condition cannot be cured, but it can be managed with medicines that replace cortisol and aldosterone. You may need to take these medicines:  · Several times a day, by mouth.  · Through an injection if you become so sick that you are unable to take these medicines by mouth or you are unable to keep them down.  Illness, stress, and surgery can increase your body's need for cortisol. It is very important that you talk with your health care provider and understand how to adjust your medicine dosages if you become ill, stressed, or if you are going to have surgery.  Follow these instructions at home:  · Keep all follow-up visits as told by your health care provider. This is important.  Medicines  · Know how to increase your medicine dosage during periods of stress, mild illness, or surgery.  · Take over-the-counter and prescription medicines only as told by your health care provider.  General instructions    · When you travel, carry a needle, a syringe, and an injectable form of cortisol in case of an emergency.  · In case of an emergency, wear a medical alert bracelet or neck chain so that others understand that you have Washakie's disease.  · Carry an ID card that states that you have Valentín's disease. The card should include:  ? Instructions to inject a certain amount of medicine if you are severely hurt or cannot respond.  ? The name and phone number of your health care provider.  ? The name and phone number of your closest relative.  Contact a health care provider if:  · You get sick with another illness.  · You develop new symptoms.  Get help right away if:  · You have a severe infection or other illness.  · You have severe vomiting or diarrhea.  · You find it necessary to  give yourself injectable medicine.  · You have symptoms of an Addisoniancrisis. These symptoms include:  ? Sudden, severe pain in the lower back, abdomen, or legs.  ? Severe vomiting and diarrhea.  ? Dehydration.  ? Low blood pressure.  ? Loss of consciousness.  Summary  · Bertie's disease is the inability of the adrenal glands to make hormones that regulate everyday functions of the body.  · If untreated, this condition can lead to life-threatening problems.  · It is very important that you talk with your health care provider and understand how to adjust your medicine dosages if you become ill, stressed, or if you are going to have surgery.  · Take over-the-counter and prescription medicines only as told by your health care provider.  · Keep all follow-up visits as told by your health care provider. This is important.  This information is not intended to replace advice given to you by your health care provider. Make sure you discuss any questions you have with your health care provider.  Document Released: 12/18/2006 Document Revised: 09/12/2019 Document Reviewed: 09/12/2019  [employer] Patient Education © 2020 [employer]      · Is patient discharged on Warfarin / Coumadin?   No     Depression / Suicide Risk    As you are discharged from this [facility] facility, it is important to learn how to keep safe from harming yourself.    Recognize the warning signs:  · Abrupt changes in personality, positive or negative- including increase in energy   · Giving away possessions  · Change in eating patterns- significant weight changes-  positive or negative  · Change in sleeping patterns- unable to sleep or sleeping all the time   · Unwillingness or inability to communicate  · Depression  · Unusual sadness, discouragement and loneliness  · Talk of wanting to die  · Neglect of personal appearance   · Rebelliousness- reckless behavior  · Withdrawal from people/activities they love  · Confusion- inability to  concentrate     If you or a loved one observes any of these behaviors or has concerns about self-harm, here's what you can do:  · Talk about it- your feelings and reasons for harming yourself  · Remove any means that you might use to hurt yourself (examples: pills, rope, extension cords, firearm)  · Get professional help from the community (Mental Health, Substance Abuse, psychological counseling)  · Do not be alone:Call your Safe Contact- someone whom you trust who will be there for you.  · Call your local CRISIS HOTLINE 562-1629 or 455-439-8257  · Call your local Children's Mobile Crisis Response Team [place] (752) 368-1301 or www.[employer]  · Call the toll free National Suicide Prevention Hotlines   · National Suicide Prevention Lifeline 311-148-RMLG (1869)  · National Hope Line Network 800-SUICIDE (992-9167)

## 2021-05-23 NOTE — THERAPY
Occupational Therapy   Initial Evaluation     Patient Name: Donna Isaac  Age:  55 y.o., Sex:  female  Medical Record #: [id number]  Today's Date: 5/22/2021     Precautions  Precautions: (P) Fall Risk    Assessment  Patient is 55 y.o. female with a diagnosis of syncope, adrenal insufficiency. A&Ox3, motivated for activity. Limited standing tolerance; pt and spouse state at baseline. Shows Sup with ADL transfers and self-cares. BUE function- WFL. Reviewed energy conservation tech's during ADL's at home. Living with spouse at friend's house currently. No further OT needs       Plan  Recommend Occupational Therapy - Eval only.  DC Equipment Recommendations: (P) None  Discharge Recommendations: (P) Anticipate that the patient will have no further occupational therapy needs after discharge from the hospital      05/22/21 7347   Prior Living Situation   Prior Services None   Housing / Facility 2 Story House   Steps Into Home 2   Steps In Home 14  (pt will be staying on 1st floor)   Bathroom Set up Walk In Shower;Shower Chair   Equipment Owned Front-Wheel Walker   Lives with - Patient's Self Care Capacity Spouse;Friends  ([place]staying at friends')   Prior Level of ADL Function   Self Feeding Independent   Grooming / Hygiene Independent   Bathing Independent   Dressing Independent   Toileting Independent   Prior Level of IADL Function   Medication Management Unable To Determine At This Time   Laundry Requires Assist   Kitchen Mobility Independent   Finances Unable To Determine At This Time   Home Management Requires Assist   Shopping Requires Assist   Prior Level Of Mobility Independent With Device in Home   Driving / Transportation Relatives / Others Provide Transportation   Occupation (Pre-Hospital Vocational) Retired Due To Age   Leisure Interests Hobbies   Balance Assessment   Sitting Balance (Static) Good   Sitting Balance (Dynamic) Good   Standing Balance (Static) Good   Standing Balance (Dynamic) Fair  +   Weight Shift Sitting Good   Weight Shift Standing Good   Comments fww   Bed Mobility    Supine to Sit Modified Independent   Sit to Supine Modified Independent   Scooting Modified Independent   Rolling Independent   ADL Assessment   Eating Independent   Grooming Supervision;Standing   Upper Body Dressing Independent   Lower Body Dressing Supervision   Toileting Supervision   Functional Mobility   Sit to Stand Supervised   Bed, Chair, Wheelchair Transfer Supervised   Toilet Transfers Supervised

## 2021-05-23 NOTE — CARE PLAN
Problem: Pain - Standard  Goal: Alleviation of pain or a reduction in pain to the patient’s comfort goal  Outcome: Met     Problem: Knowledge Deficit - Standard  Goal: Patient and family/care givers will demonstrate understanding of plan of care, disease process/condition, diagnostic tests and medications  Outcome: Met     Problem: Fall Risk  Goal: Patient will remain free from falls  Outcome: Met   The patient is Stable - Low risk of patient condition declining or worsening    Shift Goals  Clinical Goals: Discharge home  Patient Goals: Go Home, antibiotics in the fridge    Progress made toward(s) clinical / shift goals:  completed    Patient is not progressing towards the following goals:

## 2021-05-23 NOTE — CARE PLAN
The patient is Stable - Low risk of patient condition declining or worsening    Shift Goals  Clinical Goals: ambulate, no sycopal episodes  Patient Goals: Go home    Progress made toward(s) clinical / shift goals:  Patient ambulated to the bathroom, reports no dizziness.     Patient is not progressing towards the following goals:      Problem: Pain - Standard  Goal: Alleviation of pain or a reduction in pain to the patient’s comfort goal  Outcome: Progressing     Problem: Knowledge Deficit - Standard  Goal: Patient and family/care givers will demonstrate understanding of plan of care, disease process/condition, diagnostic tests and medications  Outcome: Progressing     Problem: Fall Risk  Goal: Patient will remain free from falls  Outcome: Progressing

## 2021-05-24 ENCOUNTER — DOCUMENTATION (OUTPATIENT)
Dept: VASCULAR LAB | Facility: MEDICAL CENTER | Age: 56
End: 2021-05-24

## 2021-05-24 ENCOUNTER — HOSPITAL ENCOUNTER (OUTPATIENT)
Facility: MEDICAL CENTER | Age: 56
End: 2021-05-24
Attending: NURSE PRACTITIONER
Payer: MEDICARE

## 2021-05-24 LAB
ALBUMIN SERPL BCP-MCNC: 2.2 G/DL (ref 3.2–4.9)
ALBUMIN/GLOB SERPL: 1.3 G/DL
ALP SERPL-CCNC: 175 U/L (ref 30–99)
ALT SERPL-CCNC: 134 U/L (ref 2–50)
ANION GAP SERPL CALC-SCNC: 9 MMOL/L (ref 7–16)
AST SERPL-CCNC: 105 U/L (ref 12–45)
BASOPHILS # BLD AUTO: 0.2 % (ref 0–1.8)
BASOPHILS # BLD: 0.01 K/UL (ref 0–0.12)
BILIRUB SERPL-MCNC: 0.9 MG/DL (ref 0.1–1.5)
BUN SERPL-MCNC: 27 MG/DL (ref 8–22)
CALCIUM SERPL-MCNC: 8 MG/DL (ref 8.5–10.5)
CHLORIDE SERPL-SCNC: 108 MMOL/L (ref 96–112)
CO2 SERPL-SCNC: 24 MMOL/L (ref 20–33)
CREAT SERPL-MCNC: 0.99 MG/DL (ref 0.5–1.4)
CRP SERPL HS-MCNC: <0.3 MG/DL (ref 0–0.75)
EOSINOPHIL # BLD AUTO: 0.04 K/UL (ref 0–0.51)
EOSINOPHIL NFR BLD: 0.7 % (ref 0–6.9)
ERYTHROCYTE [DISTWIDTH] IN BLOOD BY AUTOMATED COUNT: 64.6 FL (ref 35.9–50)
GLOBULIN SER CALC-MCNC: 1.7 G/DL (ref 1.9–3.5)
GLUCOSE SERPL-MCNC: 138 MG/DL (ref 65–99)
HCT VFR BLD AUTO: 37.4 % (ref 37–47)
HGB BLD-MCNC: 11.8 G/DL (ref 12–16)
IMM GRANULOCYTES # BLD AUTO: 0.01 K/UL (ref 0–0.11)
IMM GRANULOCYTES NFR BLD AUTO: 0.2 % (ref 0–0.9)
LYMPHOCYTES # BLD AUTO: 1.81 K/UL (ref 1–4.8)
LYMPHOCYTES NFR BLD: 32.8 % (ref 22–41)
MCH RBC QN AUTO: 30.6 PG (ref 27–33)
MCHC RBC AUTO-ENTMCNC: 31.6 G/DL (ref 33.6–35)
MCV RBC AUTO: 97.1 FL (ref 81.4–97.8)
MONOCYTES # BLD AUTO: 0.61 K/UL (ref 0–0.85)
MONOCYTES NFR BLD AUTO: 11.1 % (ref 0–13.4)
NEUTROPHILS # BLD AUTO: 3.03 K/UL (ref 2–7.15)
NEUTROPHILS NFR BLD: 55 % (ref 44–72)
NRBC # BLD AUTO: 0 K/UL
NRBC BLD-RTO: 0 /100 WBC
PLATELET # BLD AUTO: 123 K/UL (ref 164–446)
PMV BLD AUTO: 13.7 FL (ref 9–12.9)
POTASSIUM SERPL-SCNC: 4.9 MMOL/L (ref 3.6–5.5)
PROT SERPL-MCNC: 3.9 G/DL (ref 6–8.2)
RBC # BLD AUTO: 3.85 M/UL (ref 4.2–5.4)
SODIUM SERPL-SCNC: 141 MMOL/L (ref 135–145)
WBC # BLD AUTO: 5.5 K/UL (ref 4.8–10.8)

## 2021-05-24 PROCEDURE — 86140 C-REACTIVE PROTEIN: CPT

## 2021-05-24 PROCEDURE — 80053 COMPREHEN METABOLIC PANEL: CPT

## 2021-05-24 PROCEDURE — 85025 COMPLETE CBC W/AUTO DIFF WBC: CPT

## 2021-05-24 PROCEDURE — 85652 RBC SED RATE AUTOMATED: CPT

## 2021-05-24 NOTE — PROGRESS NOTES
Telemetry Shift Summary    Rhythm SR  HR Range 84-95 up to 151 non sustained  Ectopy rPVC  Measurements 0.16/0.08/0.36        Normal Values  Rhythm SR  HR Range    Measurements 0.12-0.20 / 0.06-0.10  / 0.30-0.52

## 2021-05-24 NOTE — PROGRESS NOTES
Patient missed appointment today for followup.  Will ask MA to call and reschedule.  Await further patient contact.    Michael J. Bloch, MD  [facility]    AMALIA Mccullough

## 2021-05-25 ENCOUNTER — APPOINTMENT (OUTPATIENT)
Dept: RADIOLOGY | Facility: MEDICAL CENTER | Age: 56
DRG: 312 | End: 2021-05-25
Attending: EMERGENCY MEDICINE
Payer: MEDICARE

## 2021-05-25 ENCOUNTER — APPOINTMENT (OUTPATIENT)
Dept: RADIOLOGY | Facility: MEDICAL CENTER | Age: 56
DRG: 312 | End: 2021-05-25
Attending: HOSPITALIST
Payer: MEDICARE

## 2021-05-25 ENCOUNTER — HOSPITAL ENCOUNTER (INPATIENT)
Facility: MEDICAL CENTER | Age: 56
LOS: 6 days | DRG: 312 | End: 2021-06-03
Attending: EMERGENCY MEDICINE | Admitting: HOSPITALIST
Payer: MEDICARE

## 2021-05-25 DIAGNOSIS — E27.1 ADRENAL INSUFFICIENCY (ADDISON'S DISEASE) (HCC): ICD-10-CM

## 2021-05-25 DIAGNOSIS — R55 SYNCOPE, UNSPECIFIED SYNCOPE TYPE: ICD-10-CM

## 2021-05-25 DIAGNOSIS — D69.6 THROMBOCYTOPENIA (HCC): ICD-10-CM

## 2021-05-25 DIAGNOSIS — I26.99 PULMONARY EMBOLISM, UNSPECIFIED CHRONICITY, UNSPECIFIED PULMONARY EMBOLISM TYPE, UNSPECIFIED WHETHER ACUTE COR PULMONALE PRESENT (HCC): ICD-10-CM

## 2021-05-25 DIAGNOSIS — S09.90XA CLOSED HEAD INJURY, INITIAL ENCOUNTER: ICD-10-CM

## 2021-05-25 LAB
ALBUMIN SERPL BCP-MCNC: 2.3 G/DL (ref 3.2–4.9)
ALBUMIN/GLOB SERPL: 1.1 G/DL
ALP SERPL-CCNC: 201 U/L (ref 30–99)
ALT SERPL-CCNC: 134 U/L (ref 2–50)
AMPHET UR QL SCN: NEGATIVE
ANION GAP SERPL CALC-SCNC: 7 MMOL/L (ref 7–16)
AST SERPL-CCNC: 83 U/L (ref 12–45)
BARBITURATES UR QL SCN: NEGATIVE
BASOPHILS # BLD AUTO: 0 % (ref 0–1.8)
BASOPHILS # BLD: 0 K/UL (ref 0–0.12)
BENZODIAZ UR QL SCN: NEGATIVE
BILIRUB SERPL-MCNC: 0.9 MG/DL (ref 0.1–1.5)
BUN SERPL-MCNC: 27 MG/DL (ref 8–22)
BZE UR QL SCN: NEGATIVE
CALCIUM SERPL-MCNC: 8.2 MG/DL (ref 8.4–10.2)
CANNABINOIDS UR QL SCN: NEGATIVE
CHLORIDE SERPL-SCNC: 106 MMOL/L (ref 96–112)
CO2 SERPL-SCNC: 25 MMOL/L (ref 20–33)
CORTIS SERPL-MCNC: 2.7 UG/DL (ref 0–23)
CREAT SERPL-MCNC: 0.94 MG/DL (ref 0.5–1.4)
EKG IMPRESSION: NORMAL
EKG IMPRESSION: NORMAL
EOSINOPHIL # BLD AUTO: 0.02 K/UL (ref 0–0.51)
EOSINOPHIL NFR BLD: 0.5 % (ref 0–6.9)
ERYTHROCYTE [DISTWIDTH] IN BLOOD BY AUTOMATED COUNT: 62.1 FL (ref 35.9–50)
ERYTHROCYTE [SEDIMENTATION RATE] IN BLOOD BY WESTERGREN METHOD: 8 MM/HOUR (ref 0–25)
GLOBULIN SER CALC-MCNC: 2.1 G/DL (ref 1.9–3.5)
GLUCOSE SERPL-MCNC: 120 MG/DL (ref 65–99)
HCT VFR BLD AUTO: 38.8 % (ref 37–47)
HGB BLD-MCNC: 12.1 G/DL (ref 12–16)
IMM GRANULOCYTES # BLD AUTO: 0.04 K/UL (ref 0–0.11)
IMM GRANULOCYTES NFR BLD AUTO: 0.9 % (ref 0–0.9)
LIPASE SERPL-CCNC: 74 U/L (ref 7–58)
LYMPHOCYTES # BLD AUTO: 0.77 K/UL (ref 1–4.8)
LYMPHOCYTES NFR BLD: 17.4 % (ref 22–41)
MCH RBC QN AUTO: 30.3 PG (ref 27–33)
MCHC RBC AUTO-ENTMCNC: 31.2 G/DL (ref 33.6–35)
MCV RBC AUTO: 97.2 FL (ref 81.4–97.8)
METHADONE UR QL SCN: NEGATIVE
MONOCYTES # BLD AUTO: 0.38 K/UL (ref 0–0.85)
MONOCYTES NFR BLD AUTO: 8.6 % (ref 0–13.4)
NEUTROPHILS # BLD AUTO: 3.21 K/UL (ref 2–7.15)
NEUTROPHILS NFR BLD: 72.6 % (ref 44–72)
NRBC # BLD AUTO: 0 K/UL
NRBC BLD-RTO: 0 /100 WBC
NT-PROBNP SERPL IA-MCNC: 2165 PG/ML (ref 0–125)
OPIATES UR QL SCN: NEGATIVE
OXYCODONE UR QL SCN: NEGATIVE
PCP UR QL SCN: NEGATIVE
PLATELET # BLD AUTO: 86 K/UL (ref 164–446)
PMV BLD AUTO: 12.8 FL (ref 9–12.9)
POTASSIUM SERPL-SCNC: 5 MMOL/L (ref 3.6–5.5)
PROPOXYPH UR QL SCN: NEGATIVE
PROT SERPL-MCNC: 4.4 G/DL (ref 6–8.2)
RBC # BLD AUTO: 3.99 M/UL (ref 4.2–5.4)
SARS-COV-2 RNA RESP QL NAA+PROBE: NOTDETECTED
SODIUM SERPL-SCNC: 138 MMOL/L (ref 135–145)
SPECIMEN SOURCE: NORMAL
TROPONIN T SERPL-MCNC: 10 NG/L (ref 6–19)
TROPONIN T SERPL-MCNC: 12 NG/L (ref 6–19)
TROPONIN T SERPL-MCNC: 13 NG/L (ref 6–19)
TSH SERPL DL<=0.005 MIU/L-ACNC: 1.26 UIU/ML (ref 0.38–5.33)
WBC # BLD AUTO: 4.4 K/UL (ref 4.8–10.8)

## 2021-05-25 PROCEDURE — 71045 X-RAY EXAM CHEST 1 VIEW: CPT

## 2021-05-25 PROCEDURE — 700102 HCHG RX REV CODE 250 W/ 637 OVERRIDE(OP): Performed by: HOSPITALIST

## 2021-05-25 PROCEDURE — A9270 NON-COVERED ITEM OR SERVICE: HCPCS | Performed by: HOSPITALIST

## 2021-05-25 PROCEDURE — 96374 THER/PROPH/DIAG INJ IV PUSH: CPT

## 2021-05-25 PROCEDURE — 82533 TOTAL CORTISOL: CPT

## 2021-05-25 PROCEDURE — U0005 INFEC AGEN DETEC AMPLI PROBE: HCPCS

## 2021-05-25 PROCEDURE — 96375 TX/PRO/DX INJ NEW DRUG ADDON: CPT

## 2021-05-25 PROCEDURE — 700105 HCHG RX REV CODE 258: Performed by: EMERGENCY MEDICINE

## 2021-05-25 PROCEDURE — 700111 HCHG RX REV CODE 636 W/ 250 OVERRIDE (IP): Performed by: EMERGENCY MEDICINE

## 2021-05-25 PROCEDURE — 72125 CT NECK SPINE W/O DYE: CPT | Mod: MH

## 2021-05-25 PROCEDURE — U0003 INFECTIOUS AGENT DETECTION BY NUCLEIC ACID (DNA OR RNA); SEVERE ACUTE RESPIRATORY SYNDROME CORONAVIRUS 2 (SARS-COV-2) (CORONAVIRUS DISEASE [COVID-19]), AMPLIFIED PROBE TECHNIQUE, MAKING USE OF HIGH THROUGHPUT TECHNOLOGIES AS DESCRIBED BY CMS-2020-01-R: HCPCS

## 2021-05-25 PROCEDURE — 99214 OFFICE O/P EST MOD 30 MIN: CPT | Performed by: INTERNAL MEDICINE

## 2021-05-25 PROCEDURE — 83880 ASSAY OF NATRIURETIC PEPTIDE: CPT

## 2021-05-25 PROCEDURE — 73600 X-RAY EXAM OF ANKLE: CPT | Mod: RT

## 2021-05-25 PROCEDURE — 99220 PR INITIAL OBSERVATION CARE,LEVL III: CPT | Performed by: HOSPITALIST

## 2021-05-25 PROCEDURE — 80307 DRUG TEST PRSMV CHEM ANLYZR: CPT

## 2021-05-25 PROCEDURE — 84443 ASSAY THYROID STIM HORMONE: CPT

## 2021-05-25 PROCEDURE — 93005 ELECTROCARDIOGRAM TRACING: CPT

## 2021-05-25 PROCEDURE — 85025 COMPLETE CBC W/AUTO DIFF WBC: CPT

## 2021-05-25 PROCEDURE — 99285 EMERGENCY DEPT VISIT HI MDM: CPT

## 2021-05-25 PROCEDURE — 93005 ELECTROCARDIOGRAM TRACING: CPT | Performed by: STUDENT IN AN ORGANIZED HEALTH CARE EDUCATION/TRAINING PROGRAM

## 2021-05-25 PROCEDURE — 84484 ASSAY OF TROPONIN QUANT: CPT | Mod: 91

## 2021-05-25 PROCEDURE — 93010 ELECTROCARDIOGRAM REPORT: CPT | Performed by: INTERNAL MEDICINE

## 2021-05-25 PROCEDURE — 70450 CT HEAD/BRAIN W/O DYE: CPT | Mod: MH

## 2021-05-25 PROCEDURE — G0378 HOSPITAL OBSERVATION PER HR: HCPCS

## 2021-05-25 PROCEDURE — 83690 ASSAY OF LIPASE: CPT

## 2021-05-25 PROCEDURE — 80053 COMPREHEN METABOLIC PANEL: CPT

## 2021-05-25 RX ORDER — SODIUM CHLORIDE 9 MG/ML
1000 INJECTION, SOLUTION INTRAVENOUS ONCE
Status: COMPLETED | OUTPATIENT
Start: 2021-05-25 | End: 2021-05-25

## 2021-05-25 RX ORDER — ACETAMINOPHEN 325 MG/1
650 TABLET ORAL EVERY 6 HOURS PRN
Status: DISCONTINUED | OUTPATIENT
Start: 2021-05-25 | End: 2021-05-26

## 2021-05-25 RX ORDER — MONTELUKAST SODIUM 10 MG/1
10 TABLET ORAL
Status: DISCONTINUED | OUTPATIENT
Start: 2021-05-25 | End: 2021-06-03 | Stop reason: HOSPADM

## 2021-05-25 RX ORDER — BISACODYL 10 MG
10 SUPPOSITORY, RECTAL RECTAL
Status: DISCONTINUED | OUTPATIENT
Start: 2021-05-25 | End: 2021-06-03 | Stop reason: HOSPADM

## 2021-05-25 RX ORDER — PROMETHAZINE HYDROCHLORIDE 25 MG/1
12.5-25 TABLET ORAL EVERY 4 HOURS PRN
Status: DISCONTINUED | OUTPATIENT
Start: 2021-05-25 | End: 2021-06-03 | Stop reason: HOSPADM

## 2021-05-25 RX ORDER — MELOXICAM 15 MG/1
15 TABLET ORAL
Status: ON HOLD | COMMUNITY
Start: 2021-05-19 | End: 2021-06-26

## 2021-05-25 RX ORDER — CARVEDILOL 25 MG/1
25 TABLET ORAL 2 TIMES DAILY WITH MEALS
Status: ON HOLD | COMMUNITY
Start: 2021-05-20 | End: 2021-06-26

## 2021-05-25 RX ORDER — DULOXETIN HYDROCHLORIDE 30 MG/1
60 CAPSULE, DELAYED RELEASE ORAL
Status: DISCONTINUED | OUTPATIENT
Start: 2021-05-25 | End: 2021-06-03 | Stop reason: HOSPADM

## 2021-05-25 RX ORDER — ONDANSETRON 2 MG/ML
4 INJECTION INTRAMUSCULAR; INTRAVENOUS EVERY 4 HOURS PRN
Status: DISCONTINUED | OUTPATIENT
Start: 2021-05-25 | End: 2021-06-03 | Stop reason: HOSPADM

## 2021-05-25 RX ORDER — LEVOTHYROXINE SODIUM 0.05 MG/1
75 TABLET ORAL
Status: DISCONTINUED | OUTPATIENT
Start: 2021-05-25 | End: 2021-06-03 | Stop reason: HOSPADM

## 2021-05-25 RX ORDER — PROMETHAZINE HYDROCHLORIDE 25 MG/1
12.5-25 SUPPOSITORY RECTAL EVERY 4 HOURS PRN
Status: DISCONTINUED | OUTPATIENT
Start: 2021-05-25 | End: 2021-06-03 | Stop reason: HOSPADM

## 2021-05-25 RX ORDER — ONDANSETRON 4 MG/1
4 TABLET, ORALLY DISINTEGRATING ORAL EVERY 4 HOURS PRN
Status: DISCONTINUED | OUTPATIENT
Start: 2021-05-25 | End: 2021-06-03 | Stop reason: HOSPADM

## 2021-05-25 RX ORDER — PROCHLORPERAZINE EDISYLATE 5 MG/ML
5-10 INJECTION INTRAMUSCULAR; INTRAVENOUS EVERY 4 HOURS PRN
Status: DISCONTINUED | OUTPATIENT
Start: 2021-05-25 | End: 2021-06-03 | Stop reason: HOSPADM

## 2021-05-25 RX ORDER — HYDROCORTISONE 5 MG/1
5 TABLET ORAL DAILY
Status: DISCONTINUED | OUTPATIENT
Start: 2021-05-25 | End: 2021-05-25

## 2021-05-25 RX ORDER — POLYETHYLENE GLYCOL 3350 17 G/17G
1 POWDER, FOR SOLUTION ORAL
Status: DISCONTINUED | OUTPATIENT
Start: 2021-05-25 | End: 2021-06-03 | Stop reason: HOSPADM

## 2021-05-25 RX ORDER — GABAPENTIN 400 MG/1
400 CAPSULE ORAL 3 TIMES DAILY
Status: DISCONTINUED | OUTPATIENT
Start: 2021-05-25 | End: 2021-06-03 | Stop reason: HOSPADM

## 2021-05-25 RX ORDER — ESTRADIOL 1 MG/1
0.5 TABLET ORAL EVERY MORNING
Status: DISCONTINUED | OUTPATIENT
Start: 2021-05-25 | End: 2021-06-03 | Stop reason: HOSPADM

## 2021-05-25 RX ORDER — AMOXICILLIN 250 MG
2 CAPSULE ORAL 2 TIMES DAILY
Status: DISCONTINUED | OUTPATIENT
Start: 2021-05-25 | End: 2021-06-03 | Stop reason: HOSPADM

## 2021-05-25 RX ORDER — TIZANIDINE 4 MG/1
4 TABLET ORAL
Status: ON HOLD | COMMUNITY
Start: 2021-05-20 | End: 2021-06-26

## 2021-05-25 RX ADMIN — GABAPENTIN 400 MG: 400 CAPSULE ORAL at 20:21

## 2021-05-25 RX ADMIN — LEVOTHYROXINE SODIUM 75 MCG: 0.05 TABLET ORAL at 20:20

## 2021-05-25 RX ADMIN — APIXABAN 5 MG: 5 TABLET, FILM COATED ORAL at 08:48

## 2021-05-25 RX ADMIN — HYDROCORTISONE SODIUM SUCCINATE 100 MG: 100 INJECTION, POWDER, FOR SOLUTION INTRAMUSCULAR; INTRAVENOUS at 05:46

## 2021-05-25 RX ADMIN — APIXABAN 5 MG: 5 TABLET, FILM COATED ORAL at 18:14

## 2021-05-25 RX ADMIN — ESTRADIOL 0.5 MG: 1 TABLET ORAL at 08:48

## 2021-05-25 RX ADMIN — MONTELUKAST 10 MG: 10 TABLET, FILM COATED ORAL at 20:20

## 2021-05-25 RX ADMIN — HYDROCORTISONE 5 MG: 5 TABLET ORAL at 11:45

## 2021-05-25 RX ADMIN — DULOXETINE HYDROCHLORIDE 60 MG: 30 CAPSULE, DELAYED RELEASE ORAL at 20:20

## 2021-05-25 RX ADMIN — ACETAMINOPHEN 650 MG: 325 TABLET ORAL at 20:19

## 2021-05-25 RX ADMIN — SODIUM CHLORIDE 1000 ML: 9 INJECTION, SOLUTION INTRAVENOUS at 05:46

## 2021-05-25 RX ADMIN — ACETAMINOPHEN 650 MG: 325 TABLET ORAL at 08:48

## 2021-05-25 RX ADMIN — GABAPENTIN 400 MG: 400 CAPSULE ORAL at 08:48

## 2021-05-25 ASSESSMENT — ENCOUNTER SYMPTOMS
EYE PAIN: 0
INSOMNIA: 0
DOUBLE VISION: 0
HALLUCINATIONS: 0
GASTROINTESTINAL NEGATIVE: 1
CARDIOVASCULAR NEGATIVE: 1
COUGH: 0
BACK PAIN: 0
RESPIRATORY NEGATIVE: 1
SHORTNESS OF BREATH: 0
EYES NEGATIVE: 1
NAUSEA: 0
DEPRESSION: 0
SORE THROAT: 0
WEAKNESS: 0
MYALGIAS: 0
DIZZINESS: 0
HEADACHES: 0
PALPITATIONS: 0
FLANK PAIN: 0
BLOOD IN STOOL: 0
BRUISES/BLEEDS EASILY: 0
CONSTITUTIONAL NEGATIVE: 1
VOMITING: 0
FOCAL WEAKNESS: 0
ABDOMINAL PAIN: 0
BLURRED VISION: 0
SEIZURES: 0
FALLS: 1
FEVER: 0
NECK PAIN: 0

## 2021-05-25 ASSESSMENT — LIFESTYLE VARIABLES
EVER FELT BAD OR GUILTY ABOUT YOUR DRINKING: NO
HAVE PEOPLE ANNOYED YOU BY CRITICIZING YOUR DRINKING: NO
ON A TYPICAL DAY WHEN YOU DRINK ALCOHOL HOW MANY DRINKS DO YOU HAVE: 0
CONSUMPTION TOTAL: NEGATIVE
ALCOHOL_USE: NO
EVER HAD A DRINK FIRST THING IN THE MORNING TO STEADY YOUR NERVES TO GET RID OF A HANGOVER: NO
HOW MANY TIMES IN THE PAST YEAR HAVE YOU HAD 5 OR MORE DRINKS IN A DAY: 0
TOTAL SCORE: 0
TOTAL SCORE: 0
AVERAGE NUMBER OF DAYS PER WEEK YOU HAVE A DRINK CONTAINING ALCOHOL: 0
TOTAL SCORE: 0
HAVE YOU EVER FELT YOU SHOULD CUT DOWN ON YOUR DRINKING: NO

## 2021-05-25 ASSESSMENT — FIBROSIS 4 INDEX
FIB4 SCORE: 4.13
FIB4 SCORE: 4.67
FIB4 SCORE: 4.67

## 2021-05-25 ASSESSMENT — PATIENT HEALTH QUESTIONNAIRE - PHQ9
SUM OF ALL RESPONSES TO PHQ9 QUESTIONS 1 AND 2: 0
2. FEELING DOWN, DEPRESSED, IRRITABLE, OR HOPELESS: NOT AT ALL
1. LITTLE INTEREST OR PLEASURE IN DOING THINGS: NOT AT ALL

## 2021-05-25 ASSESSMENT — COGNITIVE AND FUNCTIONAL STATUS - GENERAL
CLIMB 3 TO 5 STEPS WITH RAILING: A LOT
MOBILITY SCORE: 18
MOVING FROM LYING ON BACK TO SITTING ON SIDE OF FLAT BED: A LITTLE
STANDING UP FROM CHAIR USING ARMS: A LITTLE
TOILETING: A LITTLE
SUGGESTED CMS G CODE MODIFIER MOBILITY: CK
WALKING IN HOSPITAL ROOM: A LITTLE
MOVING TO AND FROM BED TO CHAIR: A LITTLE
DAILY ACTIVITIY SCORE: 20
HELP NEEDED FOR BATHING: A LOT
DRESSING REGULAR UPPER BODY CLOTHING: A LITTLE
SUGGESTED CMS G CODE MODIFIER DAILY ACTIVITY: CJ

## 2021-05-25 ASSESSMENT — PAIN DESCRIPTION - PAIN TYPE
TYPE: ACUTE PAIN
TYPE: ACUTE PAIN

## 2021-05-25 NOTE — PROGRESS NOTES
[facility] faxed back form stating no prior records involving this patient.  Alternate office of Dr. Jeff identified at [facility]. Faxed request to their office for medical records. Dr. Hernández made aware.

## 2021-05-25 NOTE — ASSESSMENT & PLAN NOTE
-ProBNP 2165  -Echo 5/10/2021: normal LV EF>70%, normal regional wall motion.  -no sign of acute CHF exacerbation

## 2021-05-25 NOTE — PROGRESS NOTES
Patient was admitted earlier this morning. Please refer H&P for details    Briefly, 56 yo F with past medical history of Adrenal insufficiency, PE on eliquis since April 2021, Hypothyroidism, recurrent sinusitis,  traumatic brain injury with seizure [id number], who was admitted here recently twice in the month of May for syncope work-up, who presents to the emergency department on 5/25/2021 after once again having to this syncopal episodes at home.     Patient reports she had endo follow up on last Friday and was recommended to increase cortef 15mg daily for 10 days. She denies missing any meds, but admits she took cortef 15mg in the evening yesterday, instead of in the morning.     Reports passing out, dizziness while standing up, urinary incontinence. Generalized weakness. No tongue biting, no shaking activities.     CT head: neg for acute abnormalities  Echo 5/10/2021: normal LV EF>70%, normal regional wall motion.     Plans:  1. Unclear etiology for seizure. Adrenal insufficiency vs. Cardiac etiology. Patient has been admitted multiple times this months. check orthostatic vital, cortisol, TSH.   2. Cardiology consulted, I discussed the case with Dr. Vee.   3. Less likely seizure, I also discussed the case with neurology Dr. Ansari.  4. Requesting medical record from Dr. Jeff office  5. Cont tele monitoring

## 2021-05-25 NOTE — ED NOTES
"Med Rec completed: per Pt and  at bedside.     Pt currently on Merrem 1g every 8 hours and Xerava 100mg twice a day IV therapy, 5 week course scheduled to end today (5/25/21).    [facility] P: [phone]    Pt confirmed following allergies:  Allergies   Allergen Reactions   • Ancef [Cefazolin] Hives and Shortness of Breath   • Bactrim [Sulfamethoxazole W-Trimethoprim] Shortness of Breath   • Bee Venom Anaphylaxis   • Buprenorphine Anaphylaxis   • Buprenorphine Hives and Shortness of Breath   • Clindamycin Hives and Shortness of Breath   • Contrast Media With Iodine [Iodine] Hives and Swelling   • Doxycycline Hives and Shortness of Breath   • Econazole Anaphylaxis   • Econazole Nitrate [Ecoza] Anaphylaxis   • Flagyl  [Metronidazole] Hives and Unspecified   • Floxin Otic Anaphylaxis   • Floxin [Ofloxacin] Anaphylaxis, Shortness of Breath and Swelling     Cause throat swelling and difficulty breathing   • Gadolinium Derivatives Hives and Swelling     Throat swelling   • Hydrocodone-Acetaminophen Hives and Shortness of Breath   • Iodine Shortness of Breath and Anaphylaxis     Throat and tongue swelling with IV contrast   • Keflex Shortness of Breath   • Keflex Hives and Shortness of Breath   • Levaquin Shortness of Breath     anaphlyaxis   • Levaquin Anaphylaxis   • Levofloxacin Shortness of Breath   • Morphine Anaphylaxis   • Naloxone Hives     \"hives, SOB\"   • Naloxone Hives and Shortness of Breath   • Nitrofurantoin Shortness of Breath     ...and hives     • Nitrofurantoin Hives and Shortness of Breath   • Norco [Apap-Fd&C Yellow #10 Al Tai-Hydrocodone] Shortness of Breath     ...and hives     • Nyquil Hives and Shortness of Breath   • Oxycodone Shortness of Breath     ...and hives     • Oxycodone Hcl Hives and Shortness of Breath   • Paricalcitol Hives   • Paricalcitol Hives, Shortness of Breath and Unspecified     CHEST PAIN   • Penicillins Shortness of Breath     ...and hives   " "  • Penicillins Hives and Shortness of Breath   • Tape Contact Dermatitis and Swelling     Blisters, clear tegaderm ok.. No steristrips.  Other reaction(s): Other, Unknown   • Tramadol Hives   • Vicks Dayquil Cold Hives and Shortness of Breath   • Ancef [Cefazolin] Hives   • Azithromycin Hives and Shortness of Breath     Pt had Hives also   • Bextra [Valdecoxib] Rash     \"Skin burn and peeling.\"   • Flagyl [Kdc:Metronidazole+Tartrazine] Hives   • Gadolinium Swelling and Hives   • Linezolid Rash     Rash all over body   • Nyquil Hives and Shortness of Breath     Other reaction(s): Respiratory Distress   • Other Drug Hives     \"Enconazole nitrate.\" shortness of breath and hives   • Other Misc Unspecified     Adhesive tape: blisters, skin peels off   • Clindamycin      HIVES     • Clindamycin Hcl      Other reaction(s): hives   • Codeine Shortness of Breath and Rash     Rash & SOB   • Iodinated Diagnostic Agents  [Diagnostic X-Ray Materials]    • Sulfa Drugs      Other reaction(s): Hives   • Tramadol      Rash/hives   • Tygacil [Tigecycline]      itching   • Bextra [Valdecoxib] Unspecified     Skin blisters and peels off   • Tape Unspecified     Blisters and peels off        Pt's home medications:   Current Outpatient Medications on File Prior to Encounter   Medication Sig Dispense Refill   • meloxicam (MOBIC) 15 MG tablet Take 15 mg by mouth at bedtime. FOR PAIN     • tizanidine (ZANAFLEX) 4 MG Tab Take 4 mg by mouth 1 time a day as needed for Muscle Spasms.     • carvedilol (COREG) 25 MG Tab Take 25 mg by mouth 2 times a day.     • hydrocortisone (CORTEF) 10 MG Tab Take 0.5 Tablets by mouth every day for 10 days. With usual 10mg dose for a total of 15mg daily for 10 days. (Patient not taking: Reported on 5/25/2021) 5 tablet 0   • levalbuterol (XOPENEX HFA) 45 MCG/ACT inhaler Inhale 1-2 Puffs every four hours as needed for Shortness of Breath. 15 g 0   • carvedilol (COREG) 12.5 MG Tab Take 1 tablet by mouth 2 times a " day with meals. (Patient taking differently: Take 25 mg by mouth 2 times a day with meals.) 60 tablet 0   • furosemide (LASIX) 40 MG Tab Take 1 tablet by mouth every day. 30 tablet 0   • hydrocortisone (CORTEF) 5 MG Tab Take 1 tablet by mouth 1/2 hour after lunch. 30 tablet 0   • cromolyn (GASTROCROM) 100 MG/5ML solution Take 200 mg by mouth 3 times a day before meals.     • diclofenac sodium (VOLTAREN) 1 % Gel Apply 1 Application topically 1 time a day as needed (inflammation). Applied to ankle and lower back     • apixaban (ELIQUIS) 5mg Tab Take 1 tablet by mouth 2 times a day for 30 days. After completion of loading dose of 10 mg po bid x 7 days, take 5 mg po bid 60 tablet 1   • meropenem (MERREM) 1 GM Recon Soln Infuse 1 g into a venous catheter every 8 hours. Pt started a 5 week course of MERREM on 4/20     • XERAVA 100 MG Recon Soln Infuse 100 mg into a venous catheter 2 times a day. Pt started a 5 week course of XERAVA on 4/20     • amitriptyline (ELAVIL) 10 MG Tab TAKE 2 TABLETS BY MOUTH EVERY NIGHT AT BEDTIME, AND 1 TABLET BY MOUTH EVERY MORNING (Patient taking differently: Take 10-20 mg by mouth 2 times a day. 20mg every night at bedtime, 10mg every morning.  20mg = 2 tabs) 90 tablet 3   • cetirizine (ZYRTEC) 10 MG Tab Take 20 mg by mouth 2 (two) times a day. 20mg = 2 tabs     • Erenumab (AIMOVIG) 70 MG/ML Solution Auto-injector Inject 140 mg under the skin Q30 DAYS.     • cyanocobalamin (VITAMIN B-12) 1000 MCG/ML Solution Inject 1,000 mcg into the shoulder, thigh, or buttocks every Saturday.     • Dexlansoprazole (DEXILANT) 60 MG CAPSULE DELAYED RELEASE delayed-release capsule Take 60 mg by mouth every evening.     • DULoxetine (CYMBALTA) 60 MG Cap DR Particles delayed-release capsule Take 60 mg by mouth every bedtime.     • estradiol (ESTRACE) 0.5 MG tablet Take 0.5 mg by mouth every morning.     • liothyronine (CYTOMEL) 25 MCG Tab Take 25 mcg by mouth every bedtime.     • levothyroxine (SYNTHROID) 75  MCG Tab Take 75 mcg by mouth every evening.     • Somatropin (ZOMACTON) 10 MG Recon Soln Inject 0.3 mg under the skin every bedtime.     • Cholecalciferol (VITAMIN D3) 2000 UNIT Cap Take 2,000 Units by mouth every day.     • Magnesium 400 MG Tab Take 400 mg by mouth every evening.     • multivitamin (THERAGRAN) Tab Take 1 Tab by mouth every day.     • vitamin k 100 MCG tablet Take 100 mcg by mouth every day.     • JARDIANCE 10 MG Tab Take 10 mg by mouth every bedtime.     • Frovatriptan Succinate (FROVA) 2.5 MG Tab Take 2.5 mg by mouth as needed (For migraines). MR x 1 in 1 hour if no relief  then must wait 8 hours for another dose     • montelukast (SINGULAIR) 10 MG Tab Take 10 mg by mouth every evening.     • calcitonin, salmon, (MIACALCIN) 200 UNIT/ACT Solution Spray 1 Spray in nose every bedtime.  12   • colesevelam (WELCHOL) 625 MG Tab Take 625 mg by mouth 3 times a day, with meals.     • gabapentin (NEURONTIN) 400 MG Cap Take 1 Cap by mouth 3 times a day. 180 Cap 3
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
## 2021-05-25 NOTE — PROGRESS NOTES
Dr. Hernández requesting endocrinology records. Patient says her endocrinologist is Alber Jeff with [facility]. Authorization for health information release signed by patient and faxed to [facility]. Awaiting records at this time.

## 2021-05-25 NOTE — PROGRESS NOTES
"2 RN skin check complete.   Devices in place Oxygen, Telemetry, IV site, PICC site.  Skin assessed under devices yes.  Confirmed pressure ulcers found on n/a.  New potential pressure ulcers noted on n/a.  The following interventions in place: patient affirms she will move herself in bed regularly, Barrier Paste applied, dressings applied to abdominal wounds    Skin not WNL. Irregularities include:  -generalized swelling, bruising, and flakiness of skin  -scratches amongst bilateral upper extremities, dry/cracking hands  -edema 1+ to bilateral upper extremities, slightly pitting  -significant bruise noted to right upper bicep from previous admission's failed IV site  -2+ edema to bilateral lower extremities, pitting  -cracking/dry feet. Confirmed crack to left heel, photo uploaded into Epic  -previously observed \"blister\" to right upper glute that is healing per patient  -redness noted between glutes, area cleansed & barrier paste applied  -redness to groin, area cleansed and barrier paste applied to areas of friction  -previously noted abdominal wounds, areas cleansed and hydrocolloid thin dressing applied; new photo uploaded into epic    "

## 2021-05-25 NOTE — ASSESSMENT & PLAN NOTE
-2 recent admissions for syncope,.likely sec to orthostatic hypotension in the setting of adrenal insuffiencey.   -Orthostatic vital sign positive  -Normal Head CT.  -Echo 5/10: Left ventricular ejection fraction is visually estimated to be >70%. Normal regional wall motion  -Resume cortef 15mg daily  -Cardiology consulted, rec appreciated  -Less likely seizure, I discussed the case with neurology Dr. Ansari  -nonpharmacologic interventions: compression stocking, avoid dehydration, staged position changes, discussed with patient  -PT/OT: SNF  -Tele
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
## 2021-05-25 NOTE — ED NOTES
Pt assisted with bedpan  Abrasions to bilateral upper ext  Small Open wounds to lower abd   Red excoriated groin area

## 2021-05-25 NOTE — ASSESSMENT & PLAN NOTE
Cortisol level am 2.7, low  Reports recently her endo md Dr. Jeff inc Cortef to 15mg daily for 10 days, then 10mg daily for 10 days and then 5 mg daily.   Cont Cortef 15mg daily now

## 2021-05-25 NOTE — PROGRESS NOTES
Attending Hospitalist today will be Dr. Hernández starting at 0700.   Leah Dickerson RN  Hospitalist Service

## 2021-05-25 NOTE — CARE PLAN
Problem: Knowledge Deficit - Standard  Goal: Patient and family/care givers will demonstrate understanding of plan of care, disease process/condition, diagnostic tests and medications  Outcome: Progressing  Note: Pt is able to express any concerns or thoughts and has agreed to POC for the day.      Problem: Fall Risk  Goal: Patient will remain free from falls  Outcome: Progressing  Note: Pt is aware of high fall risk R/T history of fall, fall yesterday, different medications and low BP. Bed alarm is on and pt has agreed to use call light when wanting to get out of bed for any reason.    The patient is Watcher - Medium risk of patient condition declining or worsening        Progress made toward(s) clinical / shift goals:  Yes    Patient is not progressing towards the following goals: n/a

## 2021-05-25 NOTE — PROGRESS NOTES
Telemetry Shift Summary    Rhythm SB/SR  HR Range 41-85  Ectopy ST Depression  Measurements 0.18/0.10/0.40        Normal Values  Rhythm SR  HR Range    Measurements 0.12-0.20 / 0.06-0.10  / 0.30-0.52

## 2021-05-25 NOTE — ED TRIAGE NOTES
Patient presents from home after experiencing a syncopal episode when trying to get to the bathroom around 0330 this AM. States she hit her head and takes Eliquis. Patient mildly hypotensive per EMS when on scene. Patient reports headache and neck pain. Arrives with C-collar in place. GCS of 15 upon arrival.

## 2021-05-26 ENCOUNTER — PATIENT OUTREACH (OUTPATIENT)
Dept: HEALTH INFORMATION MANAGEMENT | Facility: OTHER | Age: 56
End: 2021-05-26

## 2021-05-26 PROBLEM — R74.01 TRANSAMINITIS: Status: ACTIVE | Noted: 2021-05-26

## 2021-05-26 LAB
ALBUMIN SERPL BCP-MCNC: 2.1 G/DL (ref 3.2–4.9)
ALBUMIN/GLOB SERPL: 0.8 G/DL
ALP SERPL-CCNC: 251 U/L (ref 30–99)
ALT SERPL-CCNC: 179 U/L (ref 2–50)
ANION GAP SERPL CALC-SCNC: 12 MMOL/L (ref 7–16)
AST SERPL-CCNC: 161 U/L (ref 12–45)
BACTERIA BLD CULT: NORMAL
BACTERIA BLD CULT: NORMAL
BILIRUB SERPL-MCNC: 1.1 MG/DL (ref 0.1–1.5)
BUN SERPL-MCNC: 22 MG/DL (ref 8–22)
CALCIUM SERPL-MCNC: 8.4 MG/DL (ref 8.4–10.2)
CHLORIDE SERPL-SCNC: 103 MMOL/L (ref 96–112)
CO2 SERPL-SCNC: 17 MMOL/L (ref 20–33)
CREAT SERPL-MCNC: 0.87 MG/DL (ref 0.5–1.4)
ERYTHROCYTE [DISTWIDTH] IN BLOOD BY AUTOMATED COUNT: 62.3 FL (ref 35.9–50)
GLOBULIN SER CALC-MCNC: 2.6 G/DL (ref 1.9–3.5)
GLUCOSE SERPL-MCNC: 89 MG/DL (ref 65–99)
HCT VFR BLD AUTO: 39.8 % (ref 37–47)
HGB BLD-MCNC: 12.7 G/DL (ref 12–16)
MCH RBC QN AUTO: 30.9 PG (ref 27–33)
MCHC RBC AUTO-ENTMCNC: 31.9 G/DL (ref 33.6–35)
MCV RBC AUTO: 96.8 FL (ref 81.4–97.8)
PLATELET # BLD AUTO: 93 K/UL (ref 164–446)
PMV BLD AUTO: 12.9 FL (ref 9–12.9)
POTASSIUM SERPL-SCNC: 5.4 MMOL/L (ref 3.6–5.5)
PROT SERPL-MCNC: 4.7 G/DL (ref 6–8.2)
RBC # BLD AUTO: 4.11 M/UL (ref 4.2–5.4)
SIGNIFICANT IND 70042: NORMAL
SIGNIFICANT IND 70042: NORMAL
SITE SITE: NORMAL
SITE SITE: NORMAL
SODIUM SERPL-SCNC: 132 MMOL/L (ref 135–145)
SOURCE SOURCE: NORMAL
SOURCE SOURCE: NORMAL
WBC # BLD AUTO: 5.8 K/UL (ref 4.8–10.8)

## 2021-05-26 PROCEDURE — 700105 HCHG RX REV CODE 258: Performed by: STUDENT IN AN ORGANIZED HEALTH CARE EDUCATION/TRAINING PROGRAM

## 2021-05-26 PROCEDURE — 85027 COMPLETE CBC AUTOMATED: CPT

## 2021-05-26 PROCEDURE — A9270 NON-COVERED ITEM OR SERVICE: HCPCS | Performed by: STUDENT IN AN ORGANIZED HEALTH CARE EDUCATION/TRAINING PROGRAM

## 2021-05-26 PROCEDURE — 700102 HCHG RX REV CODE 250 W/ 637 OVERRIDE(OP): Performed by: HOSPITALIST

## 2021-05-26 PROCEDURE — 700102 HCHG RX REV CODE 250 W/ 637 OVERRIDE(OP): Performed by: STUDENT IN AN ORGANIZED HEALTH CARE EDUCATION/TRAINING PROGRAM

## 2021-05-26 PROCEDURE — 700111 HCHG RX REV CODE 636 W/ 250 OVERRIDE (IP): Performed by: STUDENT IN AN ORGANIZED HEALTH CARE EDUCATION/TRAINING PROGRAM

## 2021-05-26 PROCEDURE — A9270 NON-COVERED ITEM OR SERVICE: HCPCS | Performed by: HOSPITALIST

## 2021-05-26 PROCEDURE — 80053 COMPREHEN METABOLIC PANEL: CPT

## 2021-05-26 PROCEDURE — G0378 HOSPITAL OBSERVATION PER HR: HCPCS

## 2021-05-26 PROCEDURE — 96365 THER/PROPH/DIAG IV INF INIT: CPT

## 2021-05-26 PROCEDURE — 99226 PR SUBSEQUENT OBSERVATION CARE,LEVEL III: CPT | Performed by: STUDENT IN AN ORGANIZED HEALTH CARE EDUCATION/TRAINING PROGRAM

## 2021-05-26 RX ORDER — CARVEDILOL 6.25 MG/1
6.25 TABLET ORAL 2 TIMES DAILY WITH MEALS
Status: DISCONTINUED | OUTPATIENT
Start: 2021-05-27 | End: 2021-06-03 | Stop reason: HOSPADM

## 2021-05-26 RX ORDER — CARVEDILOL 3.12 MG/1
3.12 TABLET ORAL 2 TIMES DAILY WITH MEALS
Status: DISCONTINUED | OUTPATIENT
Start: 2021-05-26 | End: 2021-05-26

## 2021-05-26 RX ORDER — LABETALOL HYDROCHLORIDE 5 MG/ML
10 INJECTION, SOLUTION INTRAVENOUS EVERY 6 HOURS PRN
Status: DISCONTINUED | OUTPATIENT
Start: 2021-05-26 | End: 2021-06-03 | Stop reason: HOSPADM

## 2021-05-26 RX ORDER — ACETAMINOPHEN 325 MG/1
650 TABLET ORAL EVERY 6 HOURS PRN
Status: DISCONTINUED | OUTPATIENT
Start: 2021-05-26 | End: 2021-06-03 | Stop reason: HOSPADM

## 2021-05-26 RX ORDER — SODIUM CHLORIDE 9 MG/ML
INJECTION, SOLUTION INTRAVENOUS CONTINUOUS
Status: DISCONTINUED | OUTPATIENT
Start: 2021-05-26 | End: 2021-06-01

## 2021-05-26 RX ORDER — MEROPENEM 1 G/1
1 INJECTION, POWDER, FOR SOLUTION INTRAVENOUS EVERY 8 HOURS
Status: DISCONTINUED | OUTPATIENT
Start: 2021-05-26 | End: 2021-05-26

## 2021-05-26 RX ADMIN — DOCUSATE SODIUM 50 MG AND SENNOSIDES 8.6 MG 2 TABLET: 8.6; 5 TABLET, FILM COATED ORAL at 05:39

## 2021-05-26 RX ADMIN — APIXABAN 5 MG: 5 TABLET, FILM COATED ORAL at 05:40

## 2021-05-26 RX ADMIN — MONTELUKAST 10 MG: 10 TABLET, FILM COATED ORAL at 20:11

## 2021-05-26 RX ADMIN — GABAPENTIN 400 MG: 400 CAPSULE ORAL at 09:01

## 2021-05-26 RX ADMIN — GABAPENTIN 400 MG: 400 CAPSULE ORAL at 15:51

## 2021-05-26 RX ADMIN — GABAPENTIN 400 MG: 400 CAPSULE ORAL at 20:11

## 2021-05-26 RX ADMIN — CARVEDILOL 3.12 MG: 3.12 TABLET, FILM COATED ORAL at 17:42

## 2021-05-26 RX ADMIN — APIXABAN 5 MG: 5 TABLET, FILM COATED ORAL at 17:42

## 2021-05-26 RX ADMIN — SODIUM CHLORIDE 500 ML: 9 INJECTION, SOLUTION INTRAVENOUS at 14:01

## 2021-05-26 RX ADMIN — MEROPENEM 500 MG: 500 INJECTION, POWDER, FOR SOLUTION INTRAVENOUS at 12:53

## 2021-05-26 RX ADMIN — HYDROCORTISONE 15 MG: 10 TABLET ORAL at 12:48

## 2021-05-26 RX ADMIN — LEVOTHYROXINE SODIUM 75 MCG: 0.05 TABLET ORAL at 20:11

## 2021-05-26 RX ADMIN — ESTRADIOL 0.5 MG: 1 TABLET ORAL at 05:40

## 2021-05-26 RX ADMIN — MEROPENEM 500 MG: 500 INJECTION, POWDER, FOR SOLUTION INTRAVENOUS at 17:42

## 2021-05-26 RX ADMIN — DULOXETINE HYDROCHLORIDE 60 MG: 30 CAPSULE, DELAYED RELEASE ORAL at 20:11

## 2021-05-26 RX ADMIN — ACETAMINOPHEN 650 MG: 325 TABLET ORAL at 11:15

## 2021-05-26 ASSESSMENT — ENCOUNTER SYMPTOMS
DIZZINESS: 1
WEAKNESS: 1
LOSS OF CONSCIOUSNESS: 1

## 2021-05-26 ASSESSMENT — PAIN DESCRIPTION - PAIN TYPE
TYPE: ACUTE PAIN

## 2021-05-26 NOTE — PROGRESS NOTES
Tele strip at 0244 shows SR.      Measurements from am strip were as follows:  MI=0.16  QRS=0.10  QT=0.40    Tele Shift Summary:    Rhythm : SR  Rate : 68-94   Ectopy : Per CCT Yas, pt had none.     Telemetry monitoring strips placed in pt's chart.

## 2021-05-26 NOTE — PROGRESS NOTES
Received bedside report from AILYN Hernandez and AILYN Philip. Pt resting comfortably, no current needs, universal fall and safety precautions in place, bed in lowest position and call light within reach. Pt aware of and agreeable to POC for the day.

## 2021-05-26 NOTE — PROGRESS NOTES
Hospital Medicine Daily Progress Note    Date of Service  5/26/2021    Chief Complaint  54 yo F with past medical history of Adrenal insufficiency, PE on eliquis since April 2021, Hypothyroidism, recurrent sinusitis, traumatic brain injury with seizure [id number], who was admitted here recently twice in the month of May for syncope work-up, who presents to the emergency department on 5/25/2021 after once again having to this syncopal episodes at home.      Hospital Course  Patient reports she had endo follow up on last Friday and was recommended to increase cortef 15mg daily for 10 days. She denies missing any meds, but admits she took cortef 15mg in the evening yesterday, instead of in the morning.      Reports passing out, dizziness while standing up, urinary incontinence. Generalized weakness. No tongue biting, no shaking activities.      CT head: neg for acute abnormalities  Echo 5/10/2021: normal LV EF>70%, normal regional wall motion.   Orthostatic hypotension: positive  Cortisol am: 2.7  TSH: normal    Cardiology consulted, syncope is likely orthostatic hypotension given history of Valentín's disease.     On Meropenem and Eravacycline for recurrent sinusitis    PT/OT    Interval Problem Update  Patient reports feeling generalized weakness. Dizziness while standing up, with tachy up to 130 while walking. No overnight events reported.  Denies fever, chills, chest pain, shortness breath, nausea, vomiting, abdominal pain    All Data, Medication data reviewed.  Case discussed with nursing, CM,  nurse, pharmacy in IDT rounds.     Consultants/Specialty  cardiology    Code Status  Full Code    Disposition  TBD, SNF?    Review of Systems  Review of Systems   Neurological: Positive for dizziness, loss of consciousness and weakness.   All other systems reviewed and are negative.       Physical Exam  Temp:  [36.3 °C (97.4 °F)-36.7 °C (98 °F)] 36.6 °C (97.8 °F)  Pulse:  [] 112  Resp:  [16-18] 16  BP:  ()/() 144/109  SpO2:  [100 %] 100 %    Physical Exam  Vitals and nursing note reviewed.   Constitutional:       Appearance: Normal appearance. She is obese. She is ill-appearing.   HENT:      Head: Normocephalic and atraumatic.      Nose: Nose normal.      Mouth/Throat:      Mouth: Mucous membranes are dry.      Pharynx: Oropharynx is clear.   Eyes:      Extraocular Movements: Extraocular movements intact.      Conjunctiva/sclera: Conjunctivae normal.      Pupils: Pupils are equal, round, and reactive to light.   Cardiovascular:      Rate and Rhythm: Normal rate and regular rhythm.      Pulses: Normal pulses.      Heart sounds: Normal heart sounds.   Pulmonary:      Effort: Pulmonary effort is normal. No respiratory distress.      Breath sounds: Normal breath sounds. No wheezing.   Abdominal:      General: Abdomen is flat. Bowel sounds are normal.      Palpations: Abdomen is soft.   Musculoskeletal:         General: No swelling. Normal range of motion.      Cervical back: Normal range of motion and neck supple.   Skin:     General: Skin is warm and dry.   Neurological:      General: No focal deficit present.      Mental Status: She is alert and oriented to person, place, and time. Mental status is at baseline.   Psychiatric:         Mood and Affect: Mood normal.         Behavior: Behavior normal.         Fluids  No intake or output data in the 24 hours ending 05/26/21 1222    Laboratory  Recent Labs     05/24/21  1100 05/25/21  0427   WBC 5.5 4.4*   RBC 3.85* 3.99*   HEMOGLOBIN 11.8* 12.1   HEMATOCRIT 37.4 38.8   MCV 97.1 97.2   MCH 30.6 30.3   MCHC 31.6* 31.2*   RDW 64.6* 62.1*   PLATELETCT 123* 86*   MPV 13.7* 12.8     Recent Labs     05/24/21  1100 05/25/21  0427 05/26/21  0711   SODIUM 141 138 132*   POTASSIUM 4.9 5.0 5.4   CHLORIDE 108 106 103   CO2 24 25 17*   GLUCOSE 138* 120* 89   BUN 27* 27* 22   CREATININE 0.99 0.94 0.87   CALCIUM 8.0* 8.2* 8.4                   Imaging  DX-ANKLE 2- VIEWS RIGHT    Final Result      Interval medial and lateral malleolus fracture fixation with no new displacement. The fractures have not fully healed      Complete chronic subtalar fusion. Calcaneal cuboid and talonavicular fixation plates in place, mature fusion not yet present      DX-CHEST-PORTABLE (1 VIEW)   Final Result      Stable chest      CT-CSPINE WITHOUT PLUS RECONS   Final Result      No CT evidence of acute cervical spine abnormality.      Mild dextro scoliotic curvature at the cervicothoracic junction and there is facet arthropathy      CT-HEAD W/O   Final Result      No acute intracranial abnormality is identified.      Prior sinus operative procedure with some residual left maxillary mucosal thickening      Mild white matter hypodensity is present.  This is a nonspecific finding which usually is found to represent chronic microvascular disease in patient's of this demographic.  Demyelination, age indeterminant ischemia and gliosis are also common    possibilities.           Assessment/Plan  * Syncope  Assessment & Plan  -2 recent admissions for syncope,.likely sec to orthostatic hypotension in the setting of adrenal insuffiencey.   -Orthostatic vital sign positive  -Normal Head CT.  -Echo 5/10: Left ventricular ejection fraction is visually estimated to be >70%. Normal regional wall motion  -Resume cortef 15mg daily  -Cardiology consulted, rec appreciated  -Less likely seizure, I discussed the case with neurology Dr. Ansari  -PT/OT  -Tele    Adrenal insufficiency (Valentín's disease) (HCC)- (present on admission)  Assessment & Plan  Cortisol level am 2.7, low  Reports recently her endo md Dr. Jeff inc Cortef to 15mg daily for 10 days. Requesting medical record from Dr. Jeff office office  Cont Cortef 15mg daily    Recurrent sinusitis  Assessment & Plan  [facility]  Has been on Meropenem and Eravacycline until 5/31/2021  Will continue these    Transaminitis  Assessment & Plan  Hepatitis  panel pending  Total bilirubin nomal  Cont monitor LFT    Acute pulmonary embolism (HCC)- (present on admission)  Assessment & Plan  Hx of PE on eliquis since April 2021  Cont eliquis    Thyroid disease- (present on admission)  Assessment & Plan  TSH normal  Continue levothyroxine.    Hypotension- (present on admission)  Assessment & Plan  -Hypotensive to 90s over 60s in the emergency department.  Hold carvedilol and Lasix at this time.  -hold lasix due to orthostatic hypotension. Restart carvedilol with 3.125mg bid and slowly up titrate      Obesity (BMI 35.0-39.9 without comorbidity)  Assessment & Plan  Life style modifications including healthy diet and regular exercise recommended     Chronic systolic heart failure (HCC)- (present on admission)  Assessment & Plan  -ProBNP 2165  -Echo 5/10/2021: normal LV EF>70%, normal regional wall motion.  -no sign of acute CHF exacerbation       VTE prophylaxis: eliquis

## 2021-05-26 NOTE — ASSESSMENT & PLAN NOTE
Follows [facility] group  Has been on Meropenem and Eravacycline until 5/31/2021      Completed abx.

## 2021-05-26 NOTE — CARE PLAN
The patient is Stable - Low risk of patient condition declining or worsening    Shift Goals  Clinical Goals: Sleep comfortably    Progress made toward(s) clinical / shift goals:  Patient provided earbuds    Patient is not progressing towards the following goals:

## 2021-05-26 NOTE — CARE PLAN
Problem: Pain - Standard  Goal: Alleviation of pain or a reduction in pain to the patient’s comfort goal  Outcome: Met  Flowsheets  Taken 5/26/2021 1435  OB Pain Intervention:   Medication - See MAR   [place]   Repositioned   Emotional support   Heat applied   Distraction   Rest  Taken 5/26/2021 1252  Pain Rating Scale (NPRS): 2  Note: Pt has reported decreased levels of pain with medication and heating pads     Problem: Fall Risk  Goal: Patient will remain free from falls  Outcome: Met  Note: Educated pt on level of risk for falls and need to use call light to call for help with out of bed. Pt has agreed and complied and remained free from falls   The patient is Stable - Low risk of patient condition declining or worsening    Shift Goals  Clinical Goals: Rest and sleep comfortably  Patient Goals: Discharge, breathe better, decreased pain    Progress made toward(s) clinical / shift goals:  Good progress, pt has reported decreased pain    Patient is not progressing towards the following goals: N/A

## 2021-05-26 NOTE — ASSESSMENT & PLAN NOTE
Hx of PE on eliquis since April 2021.   Cont eliquis  Patient is advised to follow up with hematology as outpatient to discuss the duration of eliquis

## 2021-05-26 NOTE — PROGRESS NOTES
Pt requesting son be added to emergency contact. Norman Christy [phone]. Added to contacts by this RN.

## 2021-05-26 NOTE — PROGRESS NOTES
Telemetry Shift Summary    Rhythm SR-ST  HR Range   Ectopy Rare PVC  Measurements 0.16/0.08/0.28        Normal Values  Rhythm SR  HR Range    Measurements 0.12-0.20 / 0.06-0.10  / 0.30-0.52

## 2021-05-26 NOTE — PROGRESS NOTES
5/26- BONIFACIO De Leon met with pt bedside to introduce CCM services. Pt states she is expecting to d/c to rehab prior to going home. Pt declined all CCM services at this time. CCM contact info left with pt and encouraged to contact if needed.

## 2021-05-26 NOTE — PROGRESS NOTES
Carissa Tirado RN from infusion called, pt was scheduled for IvIg tomorrow am. They cancelled that appt and will reschedule when pt is discharged.     Dr. Hernández notified via Voalte at 1805.

## 2021-05-27 ENCOUNTER — APPOINTMENT (OUTPATIENT)
Dept: ONCOLOGY | Facility: MEDICAL CENTER | Age: 56
End: 2021-05-27
Attending: NURSE PRACTITIONER
Payer: MEDICARE

## 2021-05-27 LAB
ALBUMIN SERPL BCP-MCNC: 2.2 G/DL (ref 3.2–4.9)
ALBUMIN/GLOB SERPL: 1.2 G/DL
ALP SERPL-CCNC: 225 U/L (ref 30–99)
ALT SERPL-CCNC: 164 U/L (ref 2–50)
ANION GAP SERPL CALC-SCNC: 11 MMOL/L (ref 7–16)
AST SERPL-CCNC: 117 U/L (ref 12–45)
BASOPHILS # BLD AUTO: 0.3 % (ref 0–1.8)
BASOPHILS # BLD: 0.02 K/UL (ref 0–0.12)
BILIRUB SERPL-MCNC: 0.8 MG/DL (ref 0.1–1.5)
BUN SERPL-MCNC: 21 MG/DL (ref 8–22)
CALCIUM SERPL-MCNC: 7.9 MG/DL (ref 8.4–10.2)
CHLORIDE SERPL-SCNC: 104 MMOL/L (ref 96–112)
CO2 SERPL-SCNC: 20 MMOL/L (ref 20–33)
COMMENT 1642: NORMAL
CREAT SERPL-MCNC: 0.77 MG/DL (ref 0.5–1.4)
EOSINOPHIL # BLD AUTO: 0.14 K/UL (ref 0–0.51)
EOSINOPHIL NFR BLD: 2.4 % (ref 0–6.9)
ERYTHROCYTE [DISTWIDTH] IN BLOOD BY AUTOMATED COUNT: 69.6 FL (ref 35.9–50)
GLOBULIN SER CALC-MCNC: 1.8 G/DL (ref 1.9–3.5)
GLUCOSE SERPL-MCNC: 99 MG/DL (ref 65–99)
HCT VFR BLD AUTO: 41.9 % (ref 37–47)
HGB BLD-MCNC: 12.4 G/DL (ref 12–16)
HYPOCHROMIA BLD QL SMEAR: ABNORMAL
IMM GRANULOCYTES # BLD AUTO: 0.04 K/UL (ref 0–0.11)
IMM GRANULOCYTES NFR BLD AUTO: 0.7 % (ref 0–0.9)
LYMPHOCYTES # BLD AUTO: 1.84 K/UL (ref 1–4.8)
LYMPHOCYTES NFR BLD: 32 % (ref 22–41)
MACROCYTES BLD QL SMEAR: ABNORMAL
MCH RBC QN AUTO: 30.6 PG (ref 27–33)
MCHC RBC AUTO-ENTMCNC: 29.6 G/DL (ref 33.6–35)
MCV RBC AUTO: 103.5 FL (ref 81.4–97.8)
MONOCYTES # BLD AUTO: 0.68 K/UL (ref 0–0.85)
MONOCYTES NFR BLD AUTO: 11.8 % (ref 0–13.4)
NEUTROPHILS # BLD AUTO: 3.03 K/UL (ref 2–7.15)
NEUTROPHILS NFR BLD: 52.8 % (ref 44–72)
NRBC # BLD AUTO: 0 K/UL
NRBC BLD-RTO: 0 /100 WBC
PLATELET # BLD AUTO: 76 K/UL (ref 164–446)
PLATELET BLD QL SMEAR: NORMAL
PMV BLD AUTO: 13 FL (ref 9–12.9)
POTASSIUM SERPL-SCNC: 4.9 MMOL/L (ref 3.6–5.5)
PROT SERPL-MCNC: 4 G/DL (ref 6–8.2)
RBC # BLD AUTO: 4.05 M/UL (ref 4.2–5.4)
RBC BLD AUTO: PRESENT
SODIUM SERPL-SCNC: 135 MMOL/L (ref 135–145)
TARGETS BLD QL SMEAR: NORMAL
WBC # BLD AUTO: 5.8 K/UL (ref 4.8–10.8)

## 2021-05-27 PROCEDURE — 97162 PT EVAL MOD COMPLEX 30 MIN: CPT

## 2021-05-27 PROCEDURE — 85025 COMPLETE CBC W/AUTO DIFF WBC: CPT

## 2021-05-27 PROCEDURE — 700105 HCHG RX REV CODE 258: Performed by: STUDENT IN AN ORGANIZED HEALTH CARE EDUCATION/TRAINING PROGRAM

## 2021-05-27 PROCEDURE — 700102 HCHG RX REV CODE 250 W/ 637 OVERRIDE(OP): Performed by: STUDENT IN AN ORGANIZED HEALTH CARE EDUCATION/TRAINING PROGRAM

## 2021-05-27 PROCEDURE — A9270 NON-COVERED ITEM OR SERVICE: HCPCS | Performed by: HOSPITALIST

## 2021-05-27 PROCEDURE — 96366 THER/PROPH/DIAG IV INF ADDON: CPT

## 2021-05-27 PROCEDURE — 80053 COMPREHEN METABOLIC PANEL: CPT

## 2021-05-27 PROCEDURE — 99226 PR SUBSEQUENT OBSERVATION CARE,LEVEL III: CPT | Performed by: STUDENT IN AN ORGANIZED HEALTH CARE EDUCATION/TRAINING PROGRAM

## 2021-05-27 PROCEDURE — 80074 ACUTE HEPATITIS PANEL: CPT

## 2021-05-27 PROCEDURE — 97535 SELF CARE MNGMENT TRAINING: CPT

## 2021-05-27 PROCEDURE — G0378 HOSPITAL OBSERVATION PER HR: HCPCS

## 2021-05-27 PROCEDURE — 700111 HCHG RX REV CODE 636 W/ 250 OVERRIDE (IP): Performed by: STUDENT IN AN ORGANIZED HEALTH CARE EDUCATION/TRAINING PROGRAM

## 2021-05-27 PROCEDURE — 97166 OT EVAL MOD COMPLEX 45 MIN: CPT

## 2021-05-27 PROCEDURE — 700102 HCHG RX REV CODE 250 W/ 637 OVERRIDE(OP): Performed by: HOSPITALIST

## 2021-05-27 PROCEDURE — A9270 NON-COVERED ITEM OR SERVICE: HCPCS | Performed by: STUDENT IN AN ORGANIZED HEALTH CARE EDUCATION/TRAINING PROGRAM

## 2021-05-27 RX ADMIN — LEVOTHYROXINE SODIUM 75 MCG: 0.05 TABLET ORAL at 19:38

## 2021-05-27 RX ADMIN — GABAPENTIN 400 MG: 400 CAPSULE ORAL at 19:37

## 2021-05-27 RX ADMIN — GABAPENTIN 400 MG: 400 CAPSULE ORAL at 08:06

## 2021-05-27 RX ADMIN — APIXABAN 5 MG: 5 TABLET, FILM COATED ORAL at 06:08

## 2021-05-27 RX ADMIN — GABAPENTIN 400 MG: 400 CAPSULE ORAL at 14:52

## 2021-05-27 RX ADMIN — APIXABAN 5 MG: 5 TABLET, FILM COATED ORAL at 17:12

## 2021-05-27 RX ADMIN — ESTRADIOL 0.5 MG: 1 TABLET ORAL at 06:07

## 2021-05-27 RX ADMIN — DULOXETINE HYDROCHLORIDE 60 MG: 30 CAPSULE, DELAYED RELEASE ORAL at 19:38

## 2021-05-27 RX ADMIN — MONTELUKAST 10 MG: 10 TABLET, FILM COATED ORAL at 19:37

## 2021-05-27 RX ADMIN — HYDROCORTISONE 15 MG: 10 TABLET ORAL at 12:48

## 2021-05-27 RX ADMIN — CARVEDILOL 6.25 MG: 6.25 TABLET, FILM COATED ORAL at 17:13

## 2021-05-27 RX ADMIN — MEROPENEM 500 MG: 500 INJECTION, POWDER, FOR SOLUTION INTRAVENOUS at 17:15

## 2021-05-27 RX ADMIN — MEROPENEM 500 MG: 500 INJECTION, POWDER, FOR SOLUTION INTRAVENOUS at 12:49

## 2021-05-27 RX ADMIN — MEROPENEM 500 MG: 500 INJECTION, POWDER, FOR SOLUTION INTRAVENOUS at 00:29

## 2021-05-27 RX ADMIN — MEROPENEM 500 MG: 500 INJECTION, POWDER, FOR SOLUTION INTRAVENOUS at 06:09

## 2021-05-27 RX ADMIN — CARVEDILOL 6.25 MG: 6.25 TABLET, FILM COATED ORAL at 08:04

## 2021-05-27 ASSESSMENT — PAIN DESCRIPTION - PAIN TYPE
TYPE: ACUTE PAIN;CHRONIC PAIN
TYPE: ACUTE PAIN
TYPE: ACUTE PAIN

## 2021-05-27 ASSESSMENT — ACTIVITIES OF DAILY LIVING (ADL): TOILETING: INDEPENDENT

## 2021-05-27 ASSESSMENT — GAIT ASSESSMENTS
GAIT LEVEL OF ASSIST: SUPERVISED
DISTANCE (FEET): 100
ASSISTIVE DEVICE: FRONT WHEEL WALKER
DEVIATION: STEP TO
DISTANCE (FEET): 20

## 2021-05-27 ASSESSMENT — COGNITIVE AND FUNCTIONAL STATUS - GENERAL
SUGGESTED CMS G CODE MODIFIER DAILY ACTIVITY: CK
MOBILITY SCORE: 19
CLIMB 3 TO 5 STEPS WITH RAILING: A LOT
WALKING IN HOSPITAL ROOM: A LITTLE
MOVING FROM LYING ON BACK TO SITTING ON SIDE OF FLAT BED: A LITTLE
PERSONAL GROOMING: A LITTLE
DRESSING REGULAR LOWER BODY CLOTHING: A LOT
DRESSING REGULAR UPPER BODY CLOTHING: A LITTLE
TOILETING: A LITTLE
HELP NEEDED FOR BATHING: A LOT
SUGGESTED CMS G CODE MODIFIER MOBILITY: CK
DAILY ACTIVITIY SCORE: 17
STANDING UP FROM CHAIR USING ARMS: A LITTLE

## 2021-05-27 ASSESSMENT — ENCOUNTER SYMPTOMS
DIZZINESS: 1
LOSS OF CONSCIOUSNESS: 1
WEAKNESS: 1

## 2021-05-27 ASSESSMENT — FIBROSIS 4 INDEX
FIB4 SCORE: 7.25
FIB4 SCORE: 6.73
FIB4 SCORE: 6.73

## 2021-05-27 NOTE — DISCHARGE PLANNING
Anticipated Discharge Disposition: TBD    Action: LSW received a voicemail from Sophia with [facility] [phone]. LSW called Sophia back and was informed that she needs to speak to pt if she d/c's home. LSW informed that pt was noncompliant with [facility] and they will need to continue to discuss getting back on service with pt.     Sophia aware that PT/OT pending at this time. No known d/c needs at this time.     Barriers to Discharge: None     Plan: LSW to continue to follow for d/c needs, LSW to assist as needed

## 2021-05-27 NOTE — PROGRESS NOTES
Monitor Summary     Rhythm:SR-ST   Measurements: 0.20/0.08/0.42  ECTOPIES: N/A        Normal Values  Rhythm SR  HR Range    Measurements 0.12-0.20 / 0.06-0.10  / 0.30-0.52

## 2021-05-27 NOTE — CARE PLAN
The patient is Stable - Low risk of patient condition declining or worsening    Shift Goals  Clinical Goals: Saftey, no falls.   Patient Goals: Rest    Progress made toward(s) clinical / shift goals:    Pt calls approprietly, bed alarm in place, non-skid socks on, call light and personal belongings within reach. Pt rested throughout the night.   Problem: Knowledge Deficit - Standard  Goal: Patient and family/care givers will demonstrate understanding of plan of care, disease process/condition, diagnostic tests and medications  Outcome: Progressing

## 2021-05-27 NOTE — THERAPY
Physical Therapy   Initial Evaluation     Patient Name: Donna Isaac  Age:  55 y.o., Sex:  female  Medical Record #: [id number]  Today's Date: 5/27/2021     Precautions: (P) Fall Risk    Assessment  Patient is 55 y.o. female with a diagnosis of syncope and collapse,she was only home 2 days following hospital stay,she has a history of falls and collapse and feels like she carnt manage at home.Acute PT needed to improve strength, exc tolerance, transfers,ambulation and stairs     Plan    Recommend Physical Therapy 4 times per week until therapy goals are met for the following treatments:  Gait Training, Neuro Re-Education / Balance, Stair Training, Therapeutic Activities and Therapeutic Exercises      05/27/21 1000   Total Time Spent   Total Time Spent (Mins) 30   Charge Group   PT Evaluation PT Evaluation Mod   Initial Contact Note    Initial Contact Note Order Received and Verified, Physical Therapy Evaluation in Progress with Full Report to Follow.   Precautions   Precautions Fall Risk   Pain 0 - 10 Group   Pain Rating Scale (NPRS) 0   Therapist Pain Assessment 0   Prior Living Situation   Prior Services Intermittent Physical Support for ADL Per Family   Housing / Facility 2 Story House   Steps Into Home 2   Steps In Home 14   Equipment Owned Front-Wheel Walker   Lives with - Patient's Self Care Capacity Spouse;Unrelated Adult   Prior Level of Functional Mobility   Bed Mobility Independent   Transfer Status Independent   Ambulation Independent   Distance Ambulation (Feet)   (limited community)   Assistive Devices Used Front-Wheel Walker   Stairs Required Assist   History of Falls   History of Falls Yes   Cognition    Cognition / Consciousness WDL   Passive ROM Lower Body   Passive ROM Lower Body WDL   Active ROM Lower Body    Active ROM Lower Body  WDL   Strength Lower Body   Lower Body Strength  X   Comments general weakness   Coordination Lower Body    Coordination Lower Body  WDL   Balance Assessment    Sitting Balance (Static) Good   Sitting Balance (Dynamic) Good   Standing Balance (Static) Fair   Standing Balance (Dynamic) Fair   Weight Shift Sitting Good   Weight Shift Standing Fair   Gait Analysis   Gait Level Of Assist Supervised   Assistive Device Front Wheel Walker   Distance (Feet) 100   # of Times Distance was Traveled 1   Deviation Step To   # of Stairs Climbed 0   Weight Bearing Status full   Bed Mobility    Supine to Sit Modified Independent   Sit to Supine Modified Independent   Scooting Modified Independent   Functional Mobility   Sit to Stand Supervised   Bed, Chair, Wheelchair Transfer Supervised   How much difficulty does the patient currently have...   Turning over in bed (including adjusting bedclothes, sheets and blankets)? 4   Sitting down on and standing up from a chair with arms (e.g., wheelchair, bedside commode, etc.) 3   Moving from lying on back to sitting on the side of the bed? 4   How much help from another person does the patient currently need...   Moving to and from a bed to a chair (including a wheelchair)? 3   Need to walk in a hospital room? 3   Climbing 3-5 steps with a railing? 2   6 clicks Mobility Score 19   Activity Tolerance   Sitting Edge of Bed 10   Standing 10   Patient / Family Goals    Patient / Family Goal #1 SNF   Short Term Goals    Short Term Goal # 1 Pt to be I with transfers in 4 V so can go home   Short Term Goal # 2 Pt to be S with amb x 200 feet in 4 V so can go home   Short Term Goal # 3 Pt to be Min A x 2 stairs in 4 Vso can get into house   Anticipated Discharge Equipment and Recommendations   DC Equipment Recommendations None   Discharge Recommendations Recommend post-acute placement for additional physical therapy services prior to discharge home   Interdisciplinary Plan of Care Collaboration   IDT Collaboration with  Nursing   Session Information   Date / Session Number  5/27-1 1/4 6/2       DC Equipment Recommendations: (P) None  Discharge  Recommendations: (P) Recommend post-acute placement for additional physical therapy services prior to discharge home

## 2021-05-27 NOTE — PROGRESS NOTES
Hospital Medicine Daily Progress Note    Date of Service  5/27/2021    Chief Complaint  54 yo F with past medical history of Adrenal insufficiency, PE on eliquis since April 2021, Hypothyroidism, recurrent sinusitis, traumatic brain injury with seizure [id number], who was admitted here recently twice in the month of May for syncope work-up, who presents to the emergency department on 5/25/2021 after once again having to this syncopal episodes at home.      Hospital Course  Patient reports she had endo follow up on last Friday and was recommended to increase cortef 15mg daily for 10 days. She denies missing any meds, but admits she took cortef 15mg in the evening yesterday, instead of in the morning.      Reports passing out, dizziness while standing up, urinary incontinence. Generalized weakness. No tongue biting, no shaking activities.      CT head: neg for acute abnormalities  Echo 5/10/2021: normal LV EF>70%, normal regional wall motion.   Orthostatic hypotension: positive  Cortisol am: 2.7  TSH: normal    Cardiology consulted, syncope is likely orthostatic hypotension given history of Valentín's disease.     On Meropenem and Eravacycline for recurrent sinusitis    PT/OT    Interval Problem Update  Patient reports feeling generalized weakness. Dizziness while standing up, with tachy up to 130 while walking. No overnight events reported.  Denies fever, chills, chest pain, shortness breath, nausea, vomiting, abdominal pain    All Data, Medication data reviewed.  Case discussed with nursing, CM,  nurse, pharmacy in IDT rounds.     Consultants/Specialty  cardiology    Code Status  Full Code    Disposition  TBD, SNF?    Review of Systems  Review of Systems   Neurological: Positive for dizziness, loss of consciousness and weakness.   All other systems reviewed and are negative.       Physical Exam  Temp:  [36.3 °C (97.4 °F)-37 °C (98.6 °F)] 36.7 °C (98 °F)  Pulse:  [] 71  Resp:  [16-20] 18  BP:  (108-163)/() 108/87  SpO2:  [97 %-100 %] 99 %    Physical Exam  Vitals and nursing note reviewed.   Constitutional:       Appearance: Normal appearance. She is obese. She is ill-appearing.   HENT:      Head: Normocephalic and atraumatic.      Nose: Nose normal.      Mouth/Throat:      Mouth: Mucous membranes are dry.      Pharynx: Oropharynx is clear.   Eyes:      Extraocular Movements: Extraocular movements intact.      Conjunctiva/sclera: Conjunctivae normal.      Pupils: Pupils are equal, round, and reactive to light.   Cardiovascular:      Rate and Rhythm: Normal rate and regular rhythm.      Pulses: Normal pulses.      Heart sounds: Normal heart sounds.   Pulmonary:      Effort: Pulmonary effort is normal. No respiratory distress.      Breath sounds: Normal breath sounds. No wheezing.   Abdominal:      General: Abdomen is flat. Bowel sounds are normal.      Palpations: Abdomen is soft.   Musculoskeletal:         General: No swelling. Normal range of motion.      Cervical back: Normal range of motion and neck supple.   Skin:     General: Skin is warm and dry.   Neurological:      General: No focal deficit present.      Mental Status: She is alert and oriented to person, place, and time. Mental status is at baseline.   Psychiatric:         Mood and Affect: Mood normal.         Behavior: Behavior normal.         Fluids  No intake or output data in the 24 hours ending 05/27/21 1038    Laboratory  Recent Labs     05/25/21  0427 05/26/21  1852 05/27/21  0300   WBC 4.4* 5.8 5.8   RBC 3.99* 4.11* 4.05*   HEMOGLOBIN 12.1 12.7 12.4   HEMATOCRIT 38.8 39.8 41.9   MCV 97.2 96.8 103.5*   MCH 30.3 30.9 30.6   MCHC 31.2* 31.9* 29.6*   RDW 62.1* 62.3* 69.6*   PLATELETCT 86* 93* 76*   MPV 12.8 12.9 13.0*     Recent Labs     05/25/21  0427 05/26/21  0711 05/27/21  0300   SODIUM 138 132* 135   POTASSIUM 5.0 5.4 4.9   CHLORIDE 106 103 104   CO2 25 17* 20   GLUCOSE 120* 89 99   BUN 27* 22 21   CREATININE 0.94 0.87 0.77    CALCIUM 8.2* 8.4 7.9*                   Imaging  DX-ANKLE 2- VIEWS RIGHT   Final Result      Interval medial and lateral malleolus fracture fixation with no new displacement. The fractures have not fully healed      Complete chronic subtalar fusion. Calcaneal cuboid and talonavicular fixation plates in place, mature fusion not yet present      DX-CHEST-PORTABLE (1 VIEW)   Final Result      Stable chest      CT-CSPINE WITHOUT PLUS RECONS   Final Result      No CT evidence of acute cervical spine abnormality.      Mild dextro scoliotic curvature at the cervicothoracic junction and there is facet arthropathy      CT-HEAD W/O   Final Result      No acute intracranial abnormality is identified.      Prior sinus operative procedure with some residual left maxillary mucosal thickening      Mild white matter hypodensity is present.  This is a nonspecific finding which usually is found to represent chronic microvascular disease in patient's of this demographic.  Demyelination, age indeterminant ischemia and gliosis are also common    possibilities.           Assessment/Plan  * Syncope  Assessment & Plan  -2 recent admissions for syncope,.likely sec to orthostatic hypotension in the setting of adrenal insuffiencey.   -Orthostatic vital sign positive  -Normal Head CT.  -Echo 5/10: Left ventricular ejection fraction is visually estimated to be >70%. Normal regional wall motion  -Resume cortef 15mg daily  -Cardiology consulted, rec appreciated  -Less likely seizure, I discussed the case with neurology Dr. Ansari  -PT/OT  -Tele    Adrenal insufficiency (Crittenden's disease) ([facility])- (present on admission)  Assessment & Plan  Cortisol level am 2.7, low  Reports recently her endo md Dr. Jeff inc Cortef to 15mg daily for 10 days. Requesting medical record from Dr. Jeff office office  Cont Cortef 15mg daily    Recurrent sinusitis  Assessment & Plan  [facility]  Has been on Meropenem and Eravacycline until  5/31/2021  Will continue these    Transaminitis  Assessment & Plan  Hepatitis panel pending  Total bilirubin nomal  Cont monitor LFT    Acute pulmonary embolism (HCC)- (present on admission)  Assessment & Plan  Hx of PE on eliquis since April 2021.   Cont eliquis  Patient is advised to follow up with hematology as outpatient to discuss the duration of eliquis    Thyroid disease- (present on admission)  Assessment & Plan  TSH normal  Continue levothyroxine.    Hypotension- (present on admission)  Assessment & Plan  -Hypotensive to 90s over 60s in the emergency department.  Hold carvedilol and Lasix at this time.  -hold lasix due to orthostatic hypotension. Restart carvedilol with 3.125mg bid and slowly up titrate to 6.25mg bid    Obesity (BMI 35.0-39.9 without comorbidity)  Assessment & Plan  Life style modifications including healthy diet and regular exercise recommended     Chronic systolic heart failure (HCC)- (present on admission)  Assessment & Plan  -ProBNP 2165  -Echo 5/10/2021: normal LV EF>70%, normal regional wall motion.  -no sign of acute CHF exacerbation       VTE prophylaxis: eliquis

## 2021-05-27 NOTE — PROGRESS NOTES
Received report with Gwen ROBERTSON from [facility] this AM. Pt resting in bed running abx. Pt stating no needs. Bed alarm in place.    No

## 2021-05-27 NOTE — CARE PLAN
The patient is Stable - Low risk of patient condition declining or worsening    Shift Goals  Clinical Goals: no falls, rest  Patient Goals: rest, work w/ PT/OT, regain strength  Family Goals: build up strength    Progress made toward(s) clinical / shift goals:  good progress  Problem: Knowledge Deficit - Standard  Goal: Patient and family/care givers will demonstrate understanding of plan of care, disease process/condition, diagnostic tests and medications  Outcome: Progressing  Note: Pt and  have both verbalized understanding of POC and discharge plan to either SNF or rehab to regain strength.      Problem: Fall Risk  Goal: Patient will remain free from falls  Outcome: Met  Note: Pt is aware of high fall risk R/T history of falls, multiple IV antibiotics, meds, and some unsteady walking. Pt uses call light appropriately and has remained free from falls.        Patient is not progressing towards the following goals:

## 2021-05-27 NOTE — THERAPY
Occupational Therapy   Initial Evaluation     Patient Name: Donna Isaac  Age:  55 y.o., Sex:  female  Medical Record #: [id number]  Today's Date: 5/27/2021     Precautions  Precautions: Fall Risk    Assessment  Patient is 55 y.o. female with a diagnosis of syncope and collapse.  Pt has been admitted 3 times this month with similar issues.  Has a complex medical history.  She was at home on O2, unable to complete self care or mobility safely as she gets dizzy and passes out.  At this time her activity tolerance for ADl's os poor, and from her report, it seems it worsens with fatigue as she says she is even more limited in the afternoon and evenings.   Pt is unable to complete all self care tasks indep at this time.  She would benefit from further inpt post acute therapy prior to home.  OT will follow while in house      Plan    Recommend Occupational Therapy 3 times per week until therapy goals are met for the following treatments:  Adaptive Equipment, Neuro Re-Education / Balance, Self Care/Activities of Daily Living, Therapeutic Activities and Therapeutic Exercises.    DC Equipment Recommendations: Other (Comments) (LB dressing equip)  Discharge Recommendations: Recommend post-acute placement for additional occupational therapy services prior to discharge home       Initial Contact Note Order Received and Verified, Occupational Therapy Evaluation in Progress with Full Report to Follow.   Prior Living Situation   Prior Services Intermittent Physical Support for ADL Per Family;Skilled Home Health Services   Housing / Facility 2 Story House   Steps Into Home 2   Steps In Home 14   Bathroom Set up Walk In Shower;Shower Chair   Equipment Owned Front-Wheel Walker;4-Wheel Walker;Wheelchair;Grab Bar(s) In Tub / Shower;Hand Held Shower;Oxygen   Lives with - Patient's Self Care Capacity Spouse;Unrelated Adult   Comments Pt and spouse staying with friends while they are looking for a house.  Spouse is on leave from  work right now and has had to be home taking care of pt. She has to have IV Abx at home multiple times per day.     Prior Level of ADL Function   Self Feeding Independent   Grooming / Hygiene Independent   Bathing Requires Assist   Dressing Requires Assist   Toileting Independent   Comments Since last admit pt has needed assist with bathing and LB dressing   Prior Level of IADL Function   Medication Management Unable To Determine At This Time   Laundry Requires Assist   Kitchen Mobility Requires Assist   Finances Requires Assist   Home Management Requires Assist   Shopping Requires Assist   Prior Level Of Mobility Supervision With Device in Home;Uses Wheel Chair for Community Mobility  (limited distance, w/c for community)   Driving / Transportation Relatives / Others Provide Transportation   Occupation (Pre-Hospital Vocational) Retired Due To Age   Leisure Interests Family   History of Falls   History of Falls Yes   Date of Last Fall 05/25/21  (reason for admit- syncope with fall)   Precautions   Precautions Fall Risk   Vitals   O2 (LPM) 1   O2 Delivery Device Nasal Cannula   Pain 0 - 10 Group   Location Head;Neck;Ankle   Location Orientation Posterior;Lower;Right   Description Aching;Sharp;Sore   Therapist Pain Assessment During Activity;Nurse Notified  (has unhealed fracture in her R foot)   Cognition    Cognition / Consciousness WDL   Level of Consciousness Alert   Active ROM Upper Body   Active ROM Upper Body  WDL   Strength Upper Body   Upper Body Strength  WDL   Balance Assessment   Sitting Balance (Static) Good   Sitting Balance (Dynamic) Fair +   Standing Balance (Static) Fair +   Standing Balance (Dynamic) Fair   Weight Shift Sitting Good   Weight Shift Standing Fair   Comments with HHA or IV pole to BR   Bed Mobility    Supine to Sit Supervised   Sit to Supine Minimal Assist   Scooting Modified Independent   ADL Assessment   Eating Independent   Grooming Supervision;Standing   Upper Body Dressing  Supervision   Lower Body Dressing Maximal Assist   Toileting Supervision   Comments limited endurance for ADl's, rushes through so she doesn't get dizzy   Functional Mobility   Sit to Stand Supervised   Bed, Chair, Wheelchair Transfer Minimal Assist   Toilet Transfers Minimal Assist   Transfer Method Stand Step   Mobility CG to BR, to sink then BTB, fatigued with thar   Distance (Feet) 20   # of Times Distance was Traveled 2   Visual Perception   Visual Perception  WDL   Activity Tolerance   Sitting Edge of Bed 5   Standing 2-3   Patient / Family Goals   Patient / Family Goal #1 go to rehab   Short Term Goals   Short Term Goal # 1 Pt ana paula dress supervuised with  setup and AE in 4 visits   Short Term Goal # 2 Pt will tolerate 10 min standing for ADl's with supervision in 5 visits   Short Term Goal # 3 Pt will tolerate OOB to chair 3 hours/day for meals in 4 visits

## 2021-05-27 NOTE — DISCHARGE PLANNING
Agency/Facility Name: [facility]  Spoke To: Sophia  Outcome: Sophia needs to have a face to face discussion with the patient before accepting back.  Requested the  give her a call at [phone].    WAGNER Munguia notified via Teams.

## 2021-05-27 NOTE — DIETARY
NUTRITION SERVICES: BMI - Pt with BMI >40 (=Body mass index is 40.68 kg/m².), Class III obesity. Noted weight did increase 8 kg from initial weight. Discussed with CNA and pt was re-weighed to confirm weight. New weight was obtained today of 107 kg = BMI of 40.68. Weight loss counseling not appropriate in acute care setting. RECOMMEND - If appropriate at DC please refer to outpatient nutrition services for weight management.

## 2021-05-28 ENCOUNTER — APPOINTMENT (OUTPATIENT)
Dept: RADIOLOGY | Facility: MEDICAL CENTER | Age: 56
DRG: 312 | End: 2021-05-28
Attending: STUDENT IN AN ORGANIZED HEALTH CARE EDUCATION/TRAINING PROGRAM
Payer: MEDICARE

## 2021-05-28 PROBLEM — I95.1 ORTHOSTATIC HYPOTENSION: Status: ACTIVE | Noted: 2019-11-11

## 2021-05-28 PROCEDURE — A9270 NON-COVERED ITEM OR SERVICE: HCPCS | Performed by: HOSPITALIST

## 2021-05-28 PROCEDURE — 700105 HCHG RX REV CODE 258: Performed by: STUDENT IN AN ORGANIZED HEALTH CARE EDUCATION/TRAINING PROGRAM

## 2021-05-28 PROCEDURE — 700111 HCHG RX REV CODE 636 W/ 250 OVERRIDE (IP): Performed by: STUDENT IN AN ORGANIZED HEALTH CARE EDUCATION/TRAINING PROGRAM

## 2021-05-28 PROCEDURE — 96366 THER/PROPH/DIAG IV INF ADDON: CPT

## 2021-05-28 PROCEDURE — 93970 EXTREMITY STUDY: CPT

## 2021-05-28 PROCEDURE — 99226 PR SUBSEQUENT OBSERVATION CARE,LEVEL III: CPT | Performed by: STUDENT IN AN ORGANIZED HEALTH CARE EDUCATION/TRAINING PROGRAM

## 2021-05-28 PROCEDURE — 700102 HCHG RX REV CODE 250 W/ 637 OVERRIDE(OP): Performed by: HOSPITALIST

## 2021-05-28 PROCEDURE — 700102 HCHG RX REV CODE 250 W/ 637 OVERRIDE(OP): Performed by: STUDENT IN AN ORGANIZED HEALTH CARE EDUCATION/TRAINING PROGRAM

## 2021-05-28 PROCEDURE — A9270 NON-COVERED ITEM OR SERVICE: HCPCS | Performed by: STUDENT IN AN ORGANIZED HEALTH CARE EDUCATION/TRAINING PROGRAM

## 2021-05-28 PROCEDURE — 770020 HCHG ROOM/CARE - TELE (206)

## 2021-05-28 RX ORDER — COLESEVELAM 180 1/1
625 TABLET ORAL
Status: DISCONTINUED | OUTPATIENT
Start: 2021-05-28 | End: 2021-06-03 | Stop reason: HOSPADM

## 2021-05-28 RX ADMIN — ACETAMINOPHEN 650 MG: 325 TABLET ORAL at 04:36

## 2021-05-28 RX ADMIN — LEVOTHYROXINE SODIUM 75 MCG: 0.05 TABLET ORAL at 20:09

## 2021-05-28 RX ADMIN — GABAPENTIN 400 MG: 400 CAPSULE ORAL at 20:09

## 2021-05-28 RX ADMIN — MEROPENEM 500 MG: 500 INJECTION, POWDER, FOR SOLUTION INTRAVENOUS at 05:18

## 2021-05-28 RX ADMIN — MEROPENEM 500 MG: 500 INJECTION, POWDER, FOR SOLUTION INTRAVENOUS at 11:59

## 2021-05-28 RX ADMIN — MEROPENEM 500 MG: 500 INJECTION, POWDER, FOR SOLUTION INTRAVENOUS at 01:06

## 2021-05-28 RX ADMIN — GABAPENTIN 400 MG: 400 CAPSULE ORAL at 08:08

## 2021-05-28 RX ADMIN — HYDROCORTISONE 15 MG: 10 TABLET ORAL at 11:58

## 2021-05-28 RX ADMIN — MONTELUKAST 10 MG: 10 TABLET, FILM COATED ORAL at 20:09

## 2021-05-28 RX ADMIN — APIXABAN 5 MG: 5 TABLET, FILM COATED ORAL at 17:09

## 2021-05-28 RX ADMIN — ACETAMINOPHEN 650 MG: 325 TABLET ORAL at 15:07

## 2021-05-28 RX ADMIN — CARVEDILOL 6.25 MG: 6.25 TABLET, FILM COATED ORAL at 17:09

## 2021-05-28 RX ADMIN — COLESEVELAM HYDROCHLORIDE 625 MG: 625 TABLET, FILM COATED ORAL at 17:09

## 2021-05-28 RX ADMIN — COLESEVELAM HYDROCHLORIDE 625 MG: 625 TABLET, FILM COATED ORAL at 11:59

## 2021-05-28 RX ADMIN — APIXABAN 5 MG: 5 TABLET, FILM COATED ORAL at 04:10

## 2021-05-28 RX ADMIN — MEROPENEM 500 MG: 500 INJECTION, POWDER, FOR SOLUTION INTRAVENOUS at 17:10

## 2021-05-28 RX ADMIN — DULOXETINE HYDROCHLORIDE 60 MG: 30 CAPSULE, DELAYED RELEASE ORAL at 20:09

## 2021-05-28 RX ADMIN — MEROPENEM 500 MG: 500 INJECTION, POWDER, FOR SOLUTION INTRAVENOUS at 23:45

## 2021-05-28 RX ADMIN — DOCUSATE SODIUM 50 MG AND SENNOSIDES 8.6 MG 2 TABLET: 8.6; 5 TABLET, FILM COATED ORAL at 04:09

## 2021-05-28 RX ADMIN — ESTRADIOL 0.5 MG: 1 TABLET ORAL at 04:10

## 2021-05-28 RX ADMIN — GABAPENTIN 400 MG: 400 CAPSULE ORAL at 15:07

## 2021-05-28 ASSESSMENT — PAIN DESCRIPTION - PAIN TYPE
TYPE: ACUTE PAIN

## 2021-05-28 ASSESSMENT — ENCOUNTER SYMPTOMS
DIZZINESS: 1
WEAKNESS: 1
LOSS OF CONSCIOUSNESS: 1

## 2021-05-28 ASSESSMENT — FIBROSIS 4 INDEX: FIB4 SCORE: 6.73

## 2021-05-28 NOTE — CARE PLAN
The patient is Watcher - Medium risk of patient condition declining or worsening    Shift Goals  Clinical Goals: no syncope/dizziness  Patient Goals: work with PT, walk more  Family Goals: build up strength    Progress made toward(s) clinical / shift goals:  MD aware of labile BP and holding all medications to prevent syncope; will collab with PT to re-assess pt depending on pt's BP    Problem: Self Care  Goal: Patient will have the ability to perform ADLs independently or with assistance (bathe, groom, dress, toilet and feed)  Outcome: Progressing  Note: Pt requests to be independent in ADLs and calls appropriately for any needed assistance.     Problem: Hemodynamics  Goal: Patient's hemodynamics, fluid balance and neurologic status will be stable or improve  Outcome: Not Progressing  Note: Alerted Dr. Hernández about pt's labile BP. Continue to monitor BP and hold BP medications as per MAR parameters.     Problem: Fluid Volume  Goal: Fluid volume balance will be maintained  Outcome: Not Progressing  Note: Pt with pitting edema to upper and lower extremities. Currently holding Lasix d/t low BP.

## 2021-05-28 NOTE — DISCHARGE PLANNING
Agency/Facility Name: [facility]  Outcome: Pt declined due to open workman's comp 9/2005 per Epic declination note

## 2021-05-28 NOTE — PROGRESS NOTES
Telemetry Shift Summary    Rhythm SB/SR/ST  HR Range   Ectopy Rare PVCs  Measurements 0.22/0.10/0.34        Normal Values  Rhythm SR  HR Range    Measurements 0.12-0.20 / 0.06-0.10  / 0.30-0.52

## 2021-05-28 NOTE — PROGRESS NOTES
PT with labile BP ranging from 60/40 to 160/140s. Manual BP was 90/70. Dr. Hernández notified. Held Coreg d/t low BP. Assisted pt to commode and back to bed, pt stated she was dizzy during transfer from commode to bed but resolved once she was resting in bed.

## 2021-05-28 NOTE — DISCHARGE PLANNING
Received Choice form at [phone]  Agency/Facility Name: 1)[facility], 2)[facility], 3)[facility]  Referral sent per Choice form @ 0000

## 2021-05-28 NOTE — PROGRESS NOTES
Gave bedside report to AILYN Avila. Pt resting comfortably, no current needs, universal fall and safety precautions in place, bed in lowest position and call light within reach.

## 2021-05-28 NOTE — PROGRESS NOTES
Telemetry Shift Summary    Rhythm: SR/ST  HR:   Ectopy: R-PVC    Measurements for strip printed 5/28/2021 at 1422    0.18 / 0.08 / 0.34    Normal Values  Rhythm: SR  HR:   Measurements: 0.12-0.20 / 0.06-0.10 / 0.30-0.52

## 2021-05-28 NOTE — CARE PLAN
Problem: Knowledge Deficit - Standard  Goal: Patient and family/care givers will demonstrate understanding of plan of care, disease process/condition, diagnostic tests and medications  Outcome: Progressing   The patient is Stable - Low risk of patient condition declining or worsening    Shift Goals  Clinical Goals: no falls  Patient Goals: sleep well  Family Goals: build up strength

## 2021-05-28 NOTE — DISCHARGE PLANNING
Anticipated Discharge Disposition: SNF     Action: LSW met with pt at bedside to discuss SNF CHOICE. Pt provided a signature for referrals to be placed to 1) [facility] 2) [place] [facility] 3) [facility]     Barriers to Discharge: None     Plan: Await SNF acceptance, LSW to continue to follow for d/c needs, LSW to assist as needed     Addendum 1426  LSW able to complete PASSR#- [id number]  LSW faxed SNF CHOICE to Sundeep KENDALL.

## 2021-05-28 NOTE — PROGRESS NOTES
Telemetry Shift Summary     Rhythm SB/SR/ST  HR Range   Ectopy Rare PVCs  Measurements 0.22/0.10/.48           Normal Values  Rhythm SR  HR Range    Measurements 0.12-0.20 / 0.06-0.10  / 0.30-0.52

## 2021-05-28 NOTE — PROGRESS NOTES
Hospital Medicine Daily Progress Note    Date of Service  5/28/2021    Chief Complaint  56 yo F with past medical history of Adrenal insufficiency, PE on eliquis since April 2021, Hypothyroidism, recurrent sinusitis, traumatic brain injury with seizure [id number], who was admitted here recently twice in the month of May for syncope work-up, who presents to the emergency department on 5/25/2021 after once again having to this syncopal episodes at home.      Hospital Course  Patient reports she had endo follow up on last Friday and was recommended to increase cortef 15mg daily for 10 days. She denies missing any meds, but admits she took cortef 15mg in the evening yesterday, instead of in the morning.      Reports passing out, dizziness while standing up, urinary incontinence. Generalized weakness. No tongue biting, no shaking activities.      CT head: neg for acute abnormalities  Echo 5/10/2021: normal LV EF>70%, normal regional wall motion.   Orthostatic hypotension: positive  Cortisol am: 2.7  TSH: normal    Cardiology consulted, syncope is likely orthostatic hypotension given history of Valentín's disease.     On Meropenem and Eravacycline for recurrent sinusitis    PT/OT: SNF    Interval Problem Update  Patient reports feeling generalized weakness. Dizziness while standing up and walking. No overnight events reported.  BUE swelling noted  Denies fever, chills, chest pain, shortness breath, nausea, vomiting, abdominal pain    All Data, Medication data reviewed.  Case discussed with nursing, CM,  nurse, pharmacy in IDT rounds.     Consultants/Specialty  cardiology    Code Status  Full Code    Disposition  TBD, SNF?    Review of Systems  Review of Systems   Neurological: Positive for dizziness, loss of consciousness and weakness.   All other systems reviewed and are negative.       Physical Exam  Temp:  [36.3 °C (97.4 °F)-36.4 °C (97.6 °F)] 36.4 °C (97.5 °F)  Pulse:  [66-89] 73  Resp:  [18-19] 18  BP:  (128-167)/() 132/85  SpO2:  [99 %-100 %] 100 %    Physical Exam  Vitals and nursing note reviewed.   Constitutional:       Appearance: Normal appearance. She is obese. She is ill-appearing.   HENT:      Head: Normocephalic and atraumatic.      Nose: Nose normal.      Mouth/Throat:      Mouth: Mucous membranes are dry.      Pharynx: Oropharynx is clear.   Eyes:      Extraocular Movements: Extraocular movements intact.      Conjunctiva/sclera: Conjunctivae normal.      Pupils: Pupils are equal, round, and reactive to light.   Cardiovascular:      Rate and Rhythm: Normal rate and regular rhythm.      Pulses: Normal pulses.      Heart sounds: Normal heart sounds.   Pulmonary:      Effort: Pulmonary effort is normal. No respiratory distress.      Breath sounds: Normal breath sounds. No wheezing.   Abdominal:      General: Abdomen is flat. Bowel sounds are normal.      Palpations: Abdomen is soft.   Musculoskeletal:         General: No swelling. Normal range of motion.      Cervical back: Normal range of motion and neck supple.   Skin:     General: Skin is warm and dry.   Neurological:      General: No focal deficit present.      Mental Status: She is alert and oriented to person, place, and time. Mental status is at baseline.   Psychiatric:         Mood and Affect: Mood normal.         Behavior: Behavior normal.         Fluids  No intake or output data in the 24 hours ending 05/28/21 1221    Laboratory  Recent Labs     05/26/21  1852 05/27/21  0300   WBC 5.8 5.8   RBC 4.11* 4.05*   HEMOGLOBIN 12.7 12.4   HEMATOCRIT 39.8 41.9   MCV 96.8 103.5*   MCH 30.9 30.6   MCHC 31.9* 29.6*   RDW 62.3* 69.6*   PLATELETCT 93* 76*   MPV 12.9 13.0*     Recent Labs     05/26/21  0711 05/27/21  0300   SODIUM 132* 135   POTASSIUM 5.4 4.9   CHLORIDE 103 104   CO2 17* 20   GLUCOSE 89 99   BUN 22 21   CREATININE 0.87 0.77   CALCIUM 8.4 7.9*                   Imaging  US-EXTREMITY VENOUS UPPER BILAT   Final Result      DX-ANKLE 2- VIEWS  RIGHT   Final Result      Interval medial and lateral malleolus fracture fixation with no new displacement. The fractures have not fully healed      Complete chronic subtalar fusion. Calcaneal cuboid and talonavicular fixation plates in place, mature fusion not yet present      DX-CHEST-PORTABLE (1 VIEW)   Final Result      Stable chest      CT-CSPINE WITHOUT PLUS RECONS   Final Result      No CT evidence of acute cervical spine abnormality.      Mild dextro scoliotic curvature at the cervicothoracic junction and there is facet arthropathy      CT-HEAD W/O   Final Result      No acute intracranial abnormality is identified.      Prior sinus operative procedure with some residual left maxillary mucosal thickening      Mild white matter hypodensity is present.  This is a nonspecific finding which usually is found to represent chronic microvascular disease in patient's of this demographic.  Demyelination, age indeterminant ischemia and gliosis are also common    possibilities.           Assessment/Plan  * Syncope  Assessment & Plan  -2 recent admissions for syncope,.likely sec to orthostatic hypotension in the setting of adrenal insuffiencey.   -Orthostatic vital sign positive  -Normal Head CT.  -Echo 5/10: Left ventricular ejection fraction is visually estimated to be >70%. Normal regional wall motion  -Resume cortef 15mg daily  -Cardiology consulted, rec appreciated  -Less likely seizure, I discussed the case with neurology Dr. Ansari  -nonpharmacologic interventions: compression stocking, avoid dehydration, staged position changes, discussed with patient  -PT/OT: SNF  -Tele    Adrenal insufficiency (Valentín's disease) ([facility])- (present on admission)  Assessment & Plan  Cortisol level am 2.7, low  Reports recently her endo md Dr. Jeff inc Cortef to 15mg daily for 10 days, then with taper dose. Requesting medical record from Dr. Jeff office office  Cont Cortef 15mg daily    Orthostatic  hypotension  Assessment & Plan  - hold lasix  - see above plans    Recurrent sinusitis  Assessment & Plan  [facility]  Has been on Meropenem and Eravacycline until 5/31/2021  Will continue these    Transaminitis  Assessment & Plan  Hepatitis panel pending  Total bilirubin nomal  Cont monitor LFT    Acute pulmonary embolism (HCC)- (present on admission)  Assessment & Plan  Hx of PE on eliquis since April 2021.   Cont eliquis  Patient is advised to follow up with hematology as outpatient to discuss the duration of eliquis    Thyroid disease- (present on admission)  Assessment & Plan  TSH normal  Continue levothyroxine.    Obesity (BMI 35.0-39.9 without comorbidity)  Assessment & Plan  Life style modifications including healthy diet and regular exercise recommended     Chronic systolic heart failure (HCC)- (present on admission)  Assessment & Plan  -ProBNP 2165  -Echo 5/10/2021: normal LV EF>70%, normal regional wall motion.  -no sign of acute CHF exacerbation       VTE prophylaxis: eliquis

## 2021-05-29 LAB
ALBUMIN SERPL BCP-MCNC: 2.1 G/DL (ref 3.2–4.9)
ALBUMIN/GLOB SERPL: 0.9 G/DL
ALP SERPL-CCNC: 225 U/L (ref 30–99)
ALT SERPL-CCNC: 115 U/L (ref 2–50)
ANION GAP SERPL CALC-SCNC: 9 MMOL/L (ref 7–16)
AST SERPL-CCNC: 51 U/L (ref 12–45)
BASOPHILS # BLD AUTO: 0.4 % (ref 0–1.8)
BASOPHILS # BLD: 0.03 K/UL (ref 0–0.12)
BILIRUB SERPL-MCNC: 1 MG/DL (ref 0.1–1.5)
BUN SERPL-MCNC: 16 MG/DL (ref 8–22)
CALCIUM SERPL-MCNC: 7.8 MG/DL (ref 8.4–10.2)
CHLORIDE SERPL-SCNC: 105 MMOL/L (ref 96–112)
CO2 SERPL-SCNC: 20 MMOL/L (ref 20–33)
CREAT SERPL-MCNC: 0.59 MG/DL (ref 0.5–1.4)
EKG IMPRESSION: NORMAL
EOSINOPHIL # BLD AUTO: 0.17 K/UL (ref 0–0.51)
EOSINOPHIL NFR BLD: 2.5 % (ref 0–6.9)
ERYTHROCYTE [DISTWIDTH] IN BLOOD BY AUTOMATED COUNT: 63.8 FL (ref 35.9–50)
GLOBULIN SER CALC-MCNC: 2.3 G/DL (ref 1.9–3.5)
GLUCOSE SERPL-MCNC: 85 MG/DL (ref 65–99)
HCT VFR BLD AUTO: 38.4 % (ref 37–47)
HGB BLD-MCNC: 12.2 G/DL (ref 12–16)
IMM GRANULOCYTES # BLD AUTO: 0.02 K/UL (ref 0–0.11)
IMM GRANULOCYTES NFR BLD AUTO: 0.3 % (ref 0–0.9)
LYMPHOCYTES # BLD AUTO: 2.01 K/UL (ref 1–4.8)
LYMPHOCYTES NFR BLD: 29.5 % (ref 22–41)
MCH RBC QN AUTO: 30.3 PG (ref 27–33)
MCHC RBC AUTO-ENTMCNC: 31.8 G/DL (ref 33.6–35)
MCV RBC AUTO: 95.5 FL (ref 81.4–97.8)
MONOCYTES # BLD AUTO: 0.98 K/UL (ref 0–0.85)
MONOCYTES NFR BLD AUTO: 14.4 % (ref 0–13.4)
NEUTROPHILS # BLD AUTO: 3.61 K/UL (ref 2–7.15)
NEUTROPHILS NFR BLD: 52.9 % (ref 44–72)
NRBC # BLD AUTO: 0 K/UL
NRBC BLD-RTO: 0 /100 WBC
PLATELET # BLD AUTO: 113 K/UL (ref 164–446)
PMV BLD AUTO: 13.1 FL (ref 9–12.9)
POTASSIUM SERPL-SCNC: 4.4 MMOL/L (ref 3.6–5.5)
PROT SERPL-MCNC: 4.4 G/DL (ref 6–8.2)
RBC # BLD AUTO: 4.02 M/UL (ref 4.2–5.4)
SODIUM SERPL-SCNC: 134 MMOL/L (ref 135–145)
WBC # BLD AUTO: 6.8 K/UL (ref 4.8–10.8)

## 2021-05-29 PROCEDURE — 700111 HCHG RX REV CODE 636 W/ 250 OVERRIDE (IP): Performed by: STUDENT IN AN ORGANIZED HEALTH CARE EDUCATION/TRAINING PROGRAM

## 2021-05-29 PROCEDURE — 99233 SBSQ HOSP IP/OBS HIGH 50: CPT | Performed by: STUDENT IN AN ORGANIZED HEALTH CARE EDUCATION/TRAINING PROGRAM

## 2021-05-29 PROCEDURE — 93005 ELECTROCARDIOGRAM TRACING: CPT | Performed by: STUDENT IN AN ORGANIZED HEALTH CARE EDUCATION/TRAINING PROGRAM

## 2021-05-29 PROCEDURE — 85025 COMPLETE CBC W/AUTO DIFF WBC: CPT

## 2021-05-29 PROCEDURE — 93010 ELECTROCARDIOGRAM REPORT: CPT | Performed by: STUDENT IN AN ORGANIZED HEALTH CARE EDUCATION/TRAINING PROGRAM

## 2021-05-29 PROCEDURE — 700105 HCHG RX REV CODE 258: Performed by: STUDENT IN AN ORGANIZED HEALTH CARE EDUCATION/TRAINING PROGRAM

## 2021-05-29 PROCEDURE — A9270 NON-COVERED ITEM OR SERVICE: HCPCS | Performed by: STUDENT IN AN ORGANIZED HEALTH CARE EDUCATION/TRAINING PROGRAM

## 2021-05-29 PROCEDURE — A9270 NON-COVERED ITEM OR SERVICE: HCPCS | Performed by: HOSPITALIST

## 2021-05-29 PROCEDURE — 700101 HCHG RX REV CODE 250: Performed by: STUDENT IN AN ORGANIZED HEALTH CARE EDUCATION/TRAINING PROGRAM

## 2021-05-29 PROCEDURE — 770020 HCHG ROOM/CARE - TELE (206)

## 2021-05-29 PROCEDURE — 97116 GAIT TRAINING THERAPY: CPT

## 2021-05-29 PROCEDURE — 87070 CULTURE OTHR SPECIMN AEROBIC: CPT

## 2021-05-29 PROCEDURE — 87205 SMEAR GRAM STAIN: CPT

## 2021-05-29 PROCEDURE — 80053 COMPREHEN METABOLIC PANEL: CPT

## 2021-05-29 PROCEDURE — 97530 THERAPEUTIC ACTIVITIES: CPT

## 2021-05-29 PROCEDURE — 700102 HCHG RX REV CODE 250 W/ 637 OVERRIDE(OP): Performed by: STUDENT IN AN ORGANIZED HEALTH CARE EDUCATION/TRAINING PROGRAM

## 2021-05-29 PROCEDURE — 700102 HCHG RX REV CODE 250 W/ 637 OVERRIDE(OP): Performed by: HOSPITALIST

## 2021-05-29 RX ORDER — HYDROCORTISONE 10 MG/1
10 TABLET ORAL DAILY
Status: DISCONTINUED | OUTPATIENT
Start: 2021-06-05 | End: 2021-06-03 | Stop reason: HOSPADM

## 2021-05-29 RX ADMIN — CARVEDILOL 6.25 MG: 6.25 TABLET, FILM COATED ORAL at 07:32

## 2021-05-29 RX ADMIN — MEROPENEM 500 MG: 500 INJECTION, POWDER, FOR SOLUTION INTRAVENOUS at 05:56

## 2021-05-29 RX ADMIN — LEVOTHYROXINE SODIUM 75 MCG: 0.05 TABLET ORAL at 20:10

## 2021-05-29 RX ADMIN — LABETALOL HYDROCHLORIDE 10 MG: 5 INJECTION, SOLUTION INTRAVENOUS at 23:34

## 2021-05-29 RX ADMIN — COLESEVELAM HYDROCHLORIDE 625 MG: 625 TABLET, FILM COATED ORAL at 07:32

## 2021-05-29 RX ADMIN — GABAPENTIN 400 MG: 400 CAPSULE ORAL at 14:05

## 2021-05-29 RX ADMIN — APIXABAN 5 MG: 5 TABLET, FILM COATED ORAL at 06:00

## 2021-05-29 RX ADMIN — HYDROCORTISONE 15 MG: 10 TABLET ORAL at 12:04

## 2021-05-29 RX ADMIN — COLESEVELAM HYDROCHLORIDE 625 MG: 625 TABLET, FILM COATED ORAL at 12:04

## 2021-05-29 RX ADMIN — CARVEDILOL 6.25 MG: 6.25 TABLET, FILM COATED ORAL at 17:31

## 2021-05-29 RX ADMIN — APIXABAN 5 MG: 5 TABLET, FILM COATED ORAL at 17:32

## 2021-05-29 RX ADMIN — GABAPENTIN 400 MG: 400 CAPSULE ORAL at 20:10

## 2021-05-29 RX ADMIN — DULOXETINE HYDROCHLORIDE 60 MG: 30 CAPSULE, DELAYED RELEASE ORAL at 20:10

## 2021-05-29 RX ADMIN — COLESEVELAM HYDROCHLORIDE 625 MG: 625 TABLET, FILM COATED ORAL at 17:31

## 2021-05-29 RX ADMIN — ESTRADIOL 0.5 MG: 1 TABLET ORAL at 06:00

## 2021-05-29 RX ADMIN — GABAPENTIN 400 MG: 400 CAPSULE ORAL at 09:52

## 2021-05-29 RX ADMIN — MONTELUKAST 10 MG: 10 TABLET, FILM COATED ORAL at 20:10

## 2021-05-29 ASSESSMENT — ENCOUNTER SYMPTOMS
LOSS OF CONSCIOUSNESS: 1
DIZZINESS: 1
WEAKNESS: 1

## 2021-05-29 ASSESSMENT — COGNITIVE AND FUNCTIONAL STATUS - GENERAL
SUGGESTED CMS G CODE MODIFIER MOBILITY: CH
MOBILITY SCORE: 24

## 2021-05-29 ASSESSMENT — GAIT ASSESSMENTS
DISTANCE (FEET): 150
DEVIATION: STEP TO
ASSISTIVE DEVICE: FRONT WHEEL WALKER
GAIT LEVEL OF ASSIST: SUPERVISED

## 2021-05-29 NOTE — PROGRESS NOTES
Telemetry shift summary:  Rhythm: SR, ST     HR Range: 80s to 108      Measurements from strip printed at 02:03:21   NC: 0.20   QRS 0.10   QT 0.40     Ectopies: Rare PVC.

## 2021-05-29 NOTE — PROGRESS NOTES
Assisted Pt up to bathroom. Heat pad provided as per Pt request to apply on her neck for headache, no pain med requested at this time. No changes from previous assessments.

## 2021-05-29 NOTE — PROGRESS NOTES
Hospital Medicine Daily Progress Note    Date of Service  5/29/2021    Chief Complaint  56 yo F with past medical history of Adrenal insufficiency, PE on eliquis since April 2021, Hypothyroidism, recurrent sinusitis, traumatic brain injury with seizure [id number], who was admitted here recently twice in the month of May for syncope work-up, who presents to the emergency department on 5/25/2021 after once again having to this syncopal episodes at home.     Patient reports she had recent endo follow up and was recommended to increase cortef 15mg daily for 10 days, then 10mg daily for 10 days and then 5mg daily. She denies missing any meds, but admits she took cortef 15mg in the evening yesterday, instead of in the morning.      Hospital Course    Reports passing out, dizziness while standing up, urinary incontinence. Generalized weakness. No tongue biting, no shaking activities.      CT head: neg for acute abnormalities  Echo 5/10/2021: normal LV EF>70%, normal regional wall motion.   Orthostatic hypotension: positive  Cortisol am: 2.7  TSH: normal    Cardiology consulted, syncope is likely orthostatic hypotension given history of [place]'s disease. On cortef per endo rec. Compressive stocking.     On Meropenem and Eravacycline for recurrent sinusitis. Check respiratory culture    Elevated ALT/AST: hepatitis panel pending    PT/OT: SNF    Interval Problem Update  Patient reports feeling generalized weakness. Dizziness while standing up and walking. No overnight events reported.  BUE swelling noted  Denies fever, chills, chest pain, shortness breath, nausea, vomiting, abdominal pain    All Data, Medication data reviewed.  Case discussed with nursing, CM,  nurse, pharmacy in IDT rounds.     Consultants/Specialty  cardiology    Code Status  Full Code    Disposition  SNF    Review of Systems  Review of Systems   Neurological: Positive for dizziness, loss of consciousness and weakness.   All other systems reviewed  and are negative.       Physical Exam  Temp:  [36.3 °C (97.4 °F)-36.7 °C (98 °F)] 36.3 °C (97.4 °F)  Pulse:  [67-92] 69  Resp:  [18] 18  BP: (111-154)/() 111/82  SpO2:  [97 %-100 %] 100 %    Physical Exam  Vitals and nursing note reviewed.   Constitutional:       Appearance: Normal appearance. She is obese. She is ill-appearing.   HENT:      Head: Normocephalic and atraumatic.      Nose: Nose normal.      Mouth/Throat:      Mouth: Mucous membranes are dry.      Pharynx: Oropharynx is clear.   Eyes:      Extraocular Movements: Extraocular movements intact.      Conjunctiva/sclera: Conjunctivae normal.      Pupils: Pupils are equal, round, and reactive to light.   Cardiovascular:      Rate and Rhythm: Normal rate and regular rhythm.      Pulses: Normal pulses.      Heart sounds: Normal heart sounds.   Pulmonary:      Effort: Pulmonary effort is normal. No respiratory distress.      Breath sounds: Normal breath sounds. No wheezing.   Abdominal:      General: Abdomen is flat. Bowel sounds are normal.      Palpations: Abdomen is soft.   Musculoskeletal:         General: No swelling. Normal range of motion.      Cervical back: Normal range of motion and neck supple.   Skin:     General: Skin is warm and dry.   Neurological:      General: No focal deficit present.      Mental Status: She is alert and oriented to person, place, and time. Mental status is at baseline.   Psychiatric:         Mood and Affect: Mood normal.         Behavior: Behavior normal.         Fluids    Intake/Output Summary (Last 24 hours) at 5/29/2021 1122  Last data filed at 5/29/2021 0556  Gross per 24 hour   Intake 200 ml   Output --   Net 200 ml       Laboratory  Recent Labs     05/26/21  1852 05/27/21  0300 05/29/21  0809   WBC 5.8 5.8 6.8   RBC 4.11* 4.05* 4.02*   HEMOGLOBIN 12.7 12.4 12.2   HEMATOCRIT 39.8 41.9 38.4   MCV 96.8 103.5* 95.5   MCH 30.9 30.6 30.3   MCHC 31.9* 29.6* 31.8*   RDW 62.3* 69.6* 63.8*   PLATELETCT 93* 76* 113*   MPV  12.9 13.0* 13.1*     Recent Labs     05/27/21  0300 05/29/21  0809   SODIUM 135 134*   POTASSIUM 4.9 4.4   CHLORIDE 104 105   CO2 20 20   GLUCOSE 99 85   BUN 21 16   CREATININE 0.77 0.59   CALCIUM 7.9* 7.8*                   Imaging  US-EXTREMITY VENOUS UPPER BILAT   Final Result      DX-ANKLE 2- VIEWS RIGHT   Final Result      Interval medial and lateral malleolus fracture fixation with no new displacement. The fractures have not fully healed      Complete chronic subtalar fusion. Calcaneal cuboid and talonavicular fixation plates in place, mature fusion not yet present      DX-CHEST-PORTABLE (1 VIEW)   Final Result      Stable chest      CT-CSPINE WITHOUT PLUS RECONS   Final Result      No CT evidence of acute cervical spine abnormality.      Mild dextro scoliotic curvature at the cervicothoracic junction and there is facet arthropathy      CT-HEAD W/O   Final Result      No acute intracranial abnormality is identified.      Prior sinus operative procedure with some residual left maxillary mucosal thickening      Mild white matter hypodensity is present.  This is a nonspecific finding which usually is found to represent chronic microvascular disease in patient's of this demographic.  Demyelination, age indeterminant ischemia and gliosis are also common    possibilities.           Assessment/Plan  * Syncope  Assessment & Plan  -2 recent admissions for syncope,.likely sec to orthostatic hypotension in the setting of adrenal insuffiencey.   -Orthostatic vital sign positive  -Normal Head CT.  -Echo 5/10: Left ventricular ejection fraction is visually estimated to be >70%. Normal regional wall motion  -Resume cortef 15mg daily  -Cardiology consulted, rec appreciated  -Less likely seizure, I discussed the case with neurology Dr. Ansari  -nonpharmacologic interventions: compression stocking, avoid dehydration, staged position changes, discussed with patient  -PT/OT: SNF  -Tele    Adrenal insufficiency (Hector's  disease) (HCC)- (present on admission)  Assessment & Plan  Cortisol level am 2.7, low  Reports recently her endo md Dr. Jeff inc Cortef to 15mg daily for 10 days, then 10mg daily for 10 days and then 5 mg daily. Requesting medical record from Dr. Jeff office office  Cont Cortef 15mg daily now    Orthostatic hypotension  Assessment & Plan  - hold lasix  - see above plans    Recurrent sinusitis  Assessment & Plan  [facility]  Has been on Meropenem and Eravacycline until 5/31/2021  Will continue these    Transaminitis  Assessment & Plan  Hepatitis panel pending  Total bilirubin nomal  Cont monitor LFT    Acute pulmonary embolism (HCC)- (present on admission)  Assessment & Plan  Hx of PE on eliquis since April 2021.   Cont eliquis  Patient is advised to follow up with hematology as outpatient to discuss the duration of eliquis    Thyroid disease- (present on admission)  Assessment & Plan  TSH normal  Continue levothyroxine.    Obesity (BMI 35.0-39.9 without comorbidity)  Assessment & Plan  Life style modifications including healthy diet and regular exercise recommended     Chronic systolic heart failure (HCC)- (present on admission)  Assessment & Plan  -ProBNP 2165  -Echo 5/10/2021: normal LV EF>70%, normal regional wall motion.  -no sign of acute CHF exacerbation       VTE prophylaxis: eliquis

## 2021-05-29 NOTE — PROGRESS NOTES
Telemetry Shift Summary    Rhythm: SR  HR: 60-80  Ectopy: R-PVC    Measurements for strip printed 5/29/2021 at 1545  HR 93  0.16 / 0.08 / 0.34    Normal Values  Rhythm: SR  HR:   Measurements: 0.12-0.20 / 0.06-0.10 / 0.30-0.52

## 2021-05-29 NOTE — CARE PLAN
The patient is Watcher - Medium risk of patient condition declining or worsening    Shift Goals  Clinical Goals: pt will not have hypotension  Patient Goals: walk more  Family Goals: build up strength    Progress made toward(s) clinical / shift goals:  Monitor BP and reassess as needed; Will collab with pt and PT when pt would like to go for a walk in the li this afternoon      Problem: Bowel Elimination  Goal: Establish and maintain regular bowel function  Outcome: Progressing  Note: Pt with loose stools, resumed home [place]

## 2021-05-29 NOTE — DISCHARGE PLANNING
Care Transition Team Discharge Planning     Anticipated Discharge Disposition: TBD     Action: Per second rounds, Md indicates pt is waiting placement, and is medically cleared.    Lsw contacted DPA and requested f/u as above.       Barriers to Discharge: TBD     Plan: Lsw will continue to follow, and assist w/ d/c planning.

## 2021-05-29 NOTE — PROGRESS NOTES
Received report from previous shift nurse. Assumed patient's cares Pt is up in bed. Pt complains of headache rated at 2/10 but states it is ok. Denies any other discomfort or needs at this time. Encouraged Pt to call for assistance if needed. Call light within reach. Bed alarm is on.

## 2021-05-29 NOTE — CARE PLAN
Problem: Knowledge Deficit - Standard  Goal: Patient and family/care givers will demonstrate understanding of plan of care, disease process/condition, diagnostic tests and medications  Outcome: Progressing     Problem: Skin Integrity  Goal: Skin integrity is maintained or improved  Outcome: Progressing     Problem: Psychosocial  Goal: Patient's level of anxiety will decrease  Outcome: Progressing  Goal: Patient's ability to verbalize feelings about condition will improve  Outcome: Progressing  Goal: Patient and family will demonstrate ability to cope with life altering diagnosis and/or procedure  Outcome: Progressing  Problem: Communication  Goal: The ability to communicate needs accurately and effectively will improve  Outcome: Progressing     Problem: Discharge Barriers/Planning  Goal: Patient's continuum of care needs are met  Outcome: Progressing     Problem: Hemodynamics  Goal: Patient's hemodynamics, fluid balance and neurologic status will be stable or improve  Outcome: Progressing     Problem: Respiratory  Goal: Patient will achieve/maintain optimum respiratory ventilation and gas exchange  Outcome: Progressing     Problem: Fluid Volume  Goal: Fluid volume balance will be maintained  Outcome: Progressing     Problem: Nutrition  Goal: Patient's nutritional and fluid intake will be adequate or improve  Outcome: Progressing  Goal: Enteral nutrition will be maintained or improve  Outcome: Progressing  Goal: Enteral nutrition will be maintained or improve  Outcome: Progressing      Problem: Bowel Elimination  Goal: Establish and maintain regular bowel function  Outcome: Progressing     The patient is Watcher - Medium risk of patient condition declining or worsening    Shift Goals  Clinical Goals: Pt will not have a syncope episode, no falls  Patient Goals: Able to sleep  Family Goals: build up strength    Progress made toward(s) clinical / shift goals:  Pt has been maintaining BP, and ambulating without  symptoms. Pt steady on feet, standby assistance.    Patient is not progressing towards the following goals:

## 2021-05-29 NOTE — THERAPY
Physical Therapy   Daily Treatment     Patient Name: Donna Isaac  Age:  55 y.o., Sex:  female  Medical Record #: [id number]  Today's Date: 5/29/2021     Precautions: Fall Risk    Assessment    Pt doing a lot better today improved transfers and ambulation 02 sat 99% on room air    Plan    Continue current treatment plan.      05/29/21 1300   Total Time Spent   Total Time Spent (Mins) 20   Charge Group   PT Gait Training 1   PT Therapeutic Activities 1   Pain 0 - 10 Group   Pain Rating Scale (NPRS) 2   Therapist Pain Assessment 2   Supine Lower Body Exercise   Supine Lower Body Exercises Yes   Sitting Lower Body Exercises   Sitting Lower Body Exercises Yes   Balance   Sitting Balance (Static) Good   Sitting Balance (Dynamic) Good   Standing Balance (Static) Fair +   Standing Balance (Dynamic) Fair +   Weight Shift Sitting Good   Weight Shift Standing Good   Gait Analysis   Gait Level Of Assist Supervised   Assistive Device Front Wheel Walker   Distance (Feet) 150   # of Times Distance was Traveled 1   Deviation Step To   Weight Bearing Status full   Bed Mobility    Supine to Sit Modified Independent   Sit to Supine Modified Independent   Scooting Modified Independent   Functional Mobility   Sit to Stand Supervised   Bed, Chair, Wheelchair Transfer Supervised   How much difficulty does the patient currently have...   Turning over in bed (including adjusting bedclothes, sheets and blankets)? 4   Sitting down on and standing up from a chair with arms (e.g., wheelchair, bedside commode, etc.) 4   Moving from lying on back to sitting on the side of the bed? 4   How much help from another person does the patient currently need...   Moving to and from a bed to a chair (including a wheelchair)? 4   Need to walk in a hospital room? 4   Climbing 3-5 steps with a railing? 4   6 clicks Mobility Score 24   Activity Tolerance   Sitting Edge of Bed 5   Standing 10   Short Term Goals    Short Term Goal # 1 Pt to be I with  transfers in 4 V so can go home   Goal Outcome # 1 Goal met   Short Term Goal # 2 Pt to be S with amb x 200 feet in 4 V so can go home   Goal Outcome # 2 Progressing as expected   Short Term Goal # 3 Pt to be Min A x 2 stairs in 4 Vso can get into house   Goal Outcome # 3 Progressing as expected   Anticipated Discharge Equipment and Recommendations   DC Equipment Recommendations None   Discharge Recommendations Recommend post-acute placement for additional physical therapy services prior to discharge home   Interdisciplinary Plan of Care Collaboration   IDT Collaboration with  Nursing   Session Information   Date / Session Number  5/29-2 2/4 6/2       DC Equipment Recommendations: (P) None  Discharge Recommendations: (P) Recommend post-acute placement for additional physical therapy services prior to discharge home

## 2021-05-29 NOTE — DISCHARGE PLANNING
Received Choice form at 0996  Agency/Facility Name: [facility]  Referral not sent    No HH order present    LSW Dorita Taylor notified

## 2021-05-30 PROBLEM — R94.31 T WAVE INVERSION IN EKG: Status: ACTIVE | Noted: 2021-05-30

## 2021-05-30 LAB
GRAM STN SPEC: NORMAL
HAV IGM SERPL QL IA: NORMAL
HBV CORE IGM SER QL: NORMAL
HBV SURFACE AG SER QL: NORMAL
HCV AB SER QL: NORMAL
SIGNIFICANT IND 70042: NORMAL
SITE SITE: NORMAL
SOURCE SOURCE: NORMAL

## 2021-05-30 PROCEDURE — 99233 SBSQ HOSP IP/OBS HIGH 50: CPT | Performed by: STUDENT IN AN ORGANIZED HEALTH CARE EDUCATION/TRAINING PROGRAM

## 2021-05-30 PROCEDURE — 700102 HCHG RX REV CODE 250 W/ 637 OVERRIDE(OP): Performed by: STUDENT IN AN ORGANIZED HEALTH CARE EDUCATION/TRAINING PROGRAM

## 2021-05-30 PROCEDURE — 770020 HCHG ROOM/CARE - TELE (206)

## 2021-05-30 PROCEDURE — A9270 NON-COVERED ITEM OR SERVICE: HCPCS | Performed by: HOSPITALIST

## 2021-05-30 PROCEDURE — 700102 HCHG RX REV CODE 250 W/ 637 OVERRIDE(OP): Performed by: HOSPITALIST

## 2021-05-30 PROCEDURE — A9270 NON-COVERED ITEM OR SERVICE: HCPCS | Performed by: STUDENT IN AN ORGANIZED HEALTH CARE EDUCATION/TRAINING PROGRAM

## 2021-05-30 RX ADMIN — HYDROCORTISONE 15 MG: 10 TABLET ORAL at 12:02

## 2021-05-30 RX ADMIN — MONTELUKAST 10 MG: 10 TABLET, FILM COATED ORAL at 20:08

## 2021-05-30 RX ADMIN — GABAPENTIN 400 MG: 400 CAPSULE ORAL at 20:09

## 2021-05-30 RX ADMIN — DULOXETINE HYDROCHLORIDE 60 MG: 30 CAPSULE, DELAYED RELEASE ORAL at 20:09

## 2021-05-30 RX ADMIN — ACETAMINOPHEN 650 MG: 325 TABLET ORAL at 00:58

## 2021-05-30 RX ADMIN — ACETAMINOPHEN 650 MG: 325 TABLET ORAL at 20:09

## 2021-05-30 RX ADMIN — GABAPENTIN 400 MG: 400 CAPSULE ORAL at 14:51

## 2021-05-30 RX ADMIN — COLESEVELAM HYDROCHLORIDE 625 MG: 625 TABLET, FILM COATED ORAL at 12:02

## 2021-05-30 RX ADMIN — LEVOTHYROXINE SODIUM 75 MCG: 0.05 TABLET ORAL at 20:08

## 2021-05-30 RX ADMIN — COLESEVELAM HYDROCHLORIDE 625 MG: 625 TABLET, FILM COATED ORAL at 17:44

## 2021-05-30 RX ADMIN — APIXABAN 5 MG: 5 TABLET, FILM COATED ORAL at 17:44

## 2021-05-30 RX ADMIN — COLESEVELAM HYDROCHLORIDE 625 MG: 625 TABLET, FILM COATED ORAL at 08:28

## 2021-05-30 RX ADMIN — GABAPENTIN 400 MG: 400 CAPSULE ORAL at 08:26

## 2021-05-30 RX ADMIN — CARVEDILOL 6.25 MG: 6.25 TABLET, FILM COATED ORAL at 17:44

## 2021-05-30 RX ADMIN — ESTRADIOL 0.5 MG: 1 TABLET ORAL at 05:40

## 2021-05-30 RX ADMIN — APIXABAN 5 MG: 5 TABLET, FILM COATED ORAL at 05:40

## 2021-05-30 ASSESSMENT — ENCOUNTER SYMPTOMS
LOSS OF CONSCIOUSNESS: 1
WEAKNESS: 1
DIZZINESS: 1

## 2021-05-30 ASSESSMENT — PAIN DESCRIPTION - PAIN TYPE
TYPE: ACUTE PAIN
TYPE: ACUTE PAIN

## 2021-05-30 ASSESSMENT — FIBROSIS 4 INDEX: FIB4 SCORE: 2.36

## 2021-05-30 NOTE — PROGRESS NOTES
Hospital Medicine Daily Progress Note    Date of Service  5/30/2021    Chief Complaint  54 yo F with past medical history of Adrenal insufficiency, PE on eliquis since April 2021, Hypothyroidism, recurrent sinusitis, traumatic brain injury with seizure [id number], who was admitted here recently twice in the month of May for syncope work-up, who presents to the emergency department on 5/25/2021 after once again having to this syncopal episodes at home.     Patient reports she had recent endo follow up and was recommended to increase cortef 15mg daily for 10 days, then 10mg daily for 10 days and then 5mg daily. She denies missing any meds, but admits she took cortef 15mg in the evening yesterday, instead of in the morning.      Hospital Course  Reports passing out, dizziness while standing up, urinary incontinence. Generalized weakness. No tongue biting, no shaking activities.      CT head: neg for acute abnormalities  Echo 5/10/2021: normal LV EF>70%, normal regional wall motion.   Orthostatic hypotension: positive  Cortisol am: 2.7  TSH: normal    Cardiology consulted, syncope is likely orthostatic hypotension given history of Valentín's disease. On cortef per endo rec. Compressive stocking.     Noted EKG changes on 5/29 with T inversion on V2-V5. Patient reports chest pressure. Order stress test    On Meropenem and Eravacycline for recurrent sinusitis. Check respiratory culture neg.     Elevated ALT/AST: hepatitis panel pending    PT/OT: SNF    Interval Problem Update  Patient reports feeling generalized weakness. Dizziness while standing up and walking. No overnight events reported.  BUE swelling noted  Denies fever, chills, chest pain, shortness breath, nausea, vomiting, abdominal pain    All Data, Medication data reviewed.  Case discussed with nursing, CM,  nurse, pharmacy in IDT rounds.     Consultants/Specialty  cardiology    Code Status  Full Code    Disposition  SNF    Review of Systems  Review of  Systems   Neurological: Positive for dizziness, loss of consciousness and weakness.   All other systems reviewed and are negative.       Physical Exam  Temp:  [36.2 °C (97.1 °F)-37.2 °C (98.9 °F)] 36.6 °C (97.9 °F)  Pulse:  [] 63  Resp:  [16-18] 16  BP: ()/() 96/72  SpO2:  [92 %-98 %] 95 %    Physical Exam  Vitals and nursing note reviewed.   Constitutional:       Appearance: Normal appearance. She is obese. She is ill-appearing.   HENT:      Head: Normocephalic and atraumatic.      Nose: Nose normal.      Mouth/Throat:      Mouth: Mucous membranes are dry.      Pharynx: Oropharynx is clear.   Eyes:      Extraocular Movements: Extraocular movements intact.      Conjunctiva/sclera: Conjunctivae normal.      Pupils: Pupils are equal, round, and reactive to light.   Cardiovascular:      Rate and Rhythm: Normal rate and regular rhythm.      Pulses: Normal pulses.      Heart sounds: Normal heart sounds.   Pulmonary:      Effort: Pulmonary effort is normal. No respiratory distress.      Breath sounds: Normal breath sounds. No wheezing.   Abdominal:      General: Abdomen is flat. Bowel sounds are normal.      Palpations: Abdomen is soft.   Musculoskeletal:         General: No swelling. Normal range of motion.      Cervical back: Normal range of motion and neck supple.      Comments: Compression stocking noted on BLE   Skin:     General: Skin is warm and dry.   Neurological:      General: No focal deficit present.      Mental Status: She is alert and oriented to person, place, and time. Mental status is at baseline.   Psychiatric:         Mood and Affect: Mood normal.         Behavior: Behavior normal.         Fluids  No intake or output data in the 24 hours ending 05/30/21 1004    Laboratory  Recent Labs     05/29/21  0809   WBC 6.8   RBC 4.02*   HEMOGLOBIN 12.2   HEMATOCRIT 38.4   MCV 95.5   MCH 30.3   MCHC 31.8*   RDW 63.8*   PLATELETCT 113*   MPV 13.1*     Recent Labs     05/29/21  0809   SODIUM 134*    POTASSIUM 4.4   CHLORIDE 105   CO2 20   GLUCOSE 85   BUN 16   CREATININE 0.59   CALCIUM 7.8*                   Imaging  US-EXTREMITY VENOUS UPPER BILAT   Final Result      DX-ANKLE 2- VIEWS RIGHT   Final Result      Interval medial and lateral malleolus fracture fixation with no new displacement. The fractures have not fully healed      Complete chronic subtalar fusion. Calcaneal cuboid and talonavicular fixation plates in place, mature fusion not yet present      DX-CHEST-PORTABLE (1 VIEW)   Final Result      Stable chest      CT-CSPINE WITHOUT PLUS RECONS   Final Result      No CT evidence of acute cervical spine abnormality.      Mild dextro scoliotic curvature at the cervicothoracic junction and there is facet arthropathy      CT-HEAD W/O   Final Result      No acute intracranial abnormality is identified.      Prior sinus operative procedure with some residual left maxillary mucosal thickening      Mild white matter hypodensity is present.  This is a nonspecific finding which usually is found to represent chronic microvascular disease in patient's of this demographic.  Demyelination, age indeterminant ischemia and gliosis are also common    possibilities.      NM-CARDIAC STRESS TEST    (Results Pending)        Assessment/Plan  * Syncope  Assessment & Plan  -2 recent admissions for syncope,.likely sec to orthostatic hypotension in the setting of adrenal insuffiencey.   -Orthostatic vital sign positive  -Normal Head CT.  -Echo 5/10: Left ventricular ejection fraction is visually estimated to be >70%. Normal regional wall motion  -Resume cortef 15mg daily  -Cardiology consulted, rec appreciated  -Less likely seizure, I discussed the case with neurology Dr. Ansari  -nonpharmacologic interventions: compression stocking, avoid dehydration, staged position changes, discussed with patient  -PT/OT: SNF  -Tele    Adrenal insufficiency ([place]'s disease) ([facility])- (present on admission)  Assessment & Plan  Cortisol level  am 2.7, low  Reports recently her endo md Dr. Jeff inc Cortef to 15mg daily for 10 days, then 10mg daily for 10 days and then 5 mg daily.   Cont Cortef 15mg daily now    Orthostatic hypotension  Assessment & Plan  - hold lasix  - see above plans    Recurrent sinusitis  Assessment & Plan  Follows [facility]  Has been on Meropenem and Eravacycline until 5/31/2021  Will continue these    T wave inversion in EKG  Assessment & Plan  V2-V5 T inversion Noted on EKG 5/29, more pronounced than prior. EKG personally reviewed  Reports intermittent chest pain.  Echo normal range of wall motion.  Will order stress test to rule out ischemia      Transaminitis  Assessment & Plan  Hepatitis panel pending  Total bilirubin nomal  Cont monitor LFT    Acute pulmonary embolism (HCC)- (present on admission)  Assessment & Plan  Hx of PE on eliquis since April 2021.   Cont eliquis  Patient is advised to follow up with hematology as outpatient to discuss the duration of eliquis    Thyroid disease- (present on admission)  Assessment & Plan  TSH normal  Continue levothyroxine.    Obesity (BMI 35.0-39.9 without comorbidity)  Assessment & Plan  Life style modifications including healthy diet and regular exercise recommended     Chronic systolic heart failure (HCC)- (present on admission)  Assessment & Plan  -ProBNP 2165  -Echo 5/10/2021: normal LV EF>70%, normal regional wall motion.  -no sign of acute CHF exacerbation       VTE prophylaxis: eliquis

## 2021-05-30 NOTE — ASSESSMENT & PLAN NOTE
V2-V5 T inversion Noted on EKG 5/29, more pronounced than prior. EKG personally reviewed  Reports intermittent chest pain.  Echo normal range of wall motion.  Stress test showed mild ischemia changes- cardiology will follow up in the clinic.   Will start on ASA.

## 2021-05-30 NOTE — PROGRESS NOTES
Monitor Summary     Rhythm: SR 69-96  Measurements: 0.20/0.10/0.46  ECTOPIES: rCOUP, r-oPVC        Normal Values  Rhythm SR  HR Range    Measurements 0.12-0.20 / 0.06-0.10  / 0.30-0.52

## 2021-05-30 NOTE — CARE PLAN
Problem: Knowledge Deficit - Standard  Goal: Patient and family/care givers will demonstrate understanding of plan of care, disease process/condition, diagnostic tests and medications  Outcome: Progressing     Problem: Mobility  Goal: Patient's capacity to carry out activities will improve  Outcome: Progressing   The patient is Stable - Low risk of patient condition declining or worsening    Shift Goals  Clinical Goals: discharge planning  Patient Goals: pain management  Family Goals: build up strength    Progress made toward(s) clinical / shift goals:  yes    Patient is not progressing towards the following goals:

## 2021-05-30 NOTE — DISCHARGE PLANNING
Received Choice form at 0930 on 5/29/21  Agency/Facility Name: [facility]  Referral sent per Choice form @ 3634

## 2021-05-30 NOTE — FACE TO FACE
Face to Face Supporting Documentation - Home Health    The encounter with this patient was in whole or in part the primary reason for home health admission.    Date of encounter:   Patient:                    MRN:                       YOB: 2022  Donna Isaac  [id number]  1965     Home health to see patient for:  Skilled Nursing care for assessment, interventions & education    Skilled need for:  Exacerbation of Chronic Disease State recurrent syncope    Skilled nursing interventions to include:  Comment: debility    Homebound status evidenced by:  Need the aid of supportive devices such as crutches, canes, wheelchairs or walkers. Leaving home requires a considerable and taxing effort. There is a normal inability to leave the home.    Community Physician to provide follow up care: Madisyn Mccullough M.D.     Optional Interventions? No      I certify the face to face encounter for this home health care referral meets the CMS requirements and the encounter/clinical assessment with the patient was, in whole, or in part, for the medical condition(s) listed above, which is the primary reason for home health care. Based on my clinical findings: the service(s) are medically necessary, support the need for home health care, and the homebound criteria are met.  I certify that this patient has had a face to face encounter by myself.  Fredy Hernández M.D. - NPI: [id number]

## 2021-05-31 ENCOUNTER — APPOINTMENT (OUTPATIENT)
Dept: RADIOLOGY | Facility: MEDICAL CENTER | Age: 56
DRG: 312 | End: 2021-05-31
Attending: STUDENT IN AN ORGANIZED HEALTH CARE EDUCATION/TRAINING PROGRAM
Payer: MEDICARE

## 2021-05-31 PROCEDURE — 97110 THERAPEUTIC EXERCISES: CPT

## 2021-05-31 PROCEDURE — 93018 CV STRESS TEST I&R ONLY: CPT | Performed by: INTERNAL MEDICINE

## 2021-05-31 PROCEDURE — 99232 SBSQ HOSP IP/OBS MODERATE 35: CPT | Performed by: STUDENT IN AN ORGANIZED HEALTH CARE EDUCATION/TRAINING PROGRAM

## 2021-05-31 PROCEDURE — A9502 TC99M TETROFOSMIN: HCPCS

## 2021-05-31 PROCEDURE — 770020 HCHG ROOM/CARE - TELE (206)

## 2021-05-31 PROCEDURE — 700102 HCHG RX REV CODE 250 W/ 637 OVERRIDE(OP): Performed by: HOSPITALIST

## 2021-05-31 PROCEDURE — 700111 HCHG RX REV CODE 636 W/ 250 OVERRIDE (IP)

## 2021-05-31 PROCEDURE — 97116 GAIT TRAINING THERAPY: CPT

## 2021-05-31 PROCEDURE — A9270 NON-COVERED ITEM OR SERVICE: HCPCS | Performed by: STUDENT IN AN ORGANIZED HEALTH CARE EDUCATION/TRAINING PROGRAM

## 2021-05-31 PROCEDURE — A9270 NON-COVERED ITEM OR SERVICE: HCPCS | Performed by: HOSPITALIST

## 2021-05-31 PROCEDURE — 700102 HCHG RX REV CODE 250 W/ 637 OVERRIDE(OP): Performed by: STUDENT IN AN ORGANIZED HEALTH CARE EDUCATION/TRAINING PROGRAM

## 2021-05-31 PROCEDURE — 78452 HT MUSCLE IMAGE SPECT MULT: CPT | Mod: 26 | Performed by: INTERNAL MEDICINE

## 2021-05-31 RX ORDER — REGADENOSON 0.08 MG/ML
INJECTION, SOLUTION INTRAVENOUS
Status: COMPLETED
Start: 2021-05-31 | End: 2021-05-31

## 2021-05-31 RX ADMIN — HYDROCORTISONE 15 MG: 10 TABLET ORAL at 11:49

## 2021-05-31 RX ADMIN — COLESEVELAM HYDROCHLORIDE 625 MG: 625 TABLET, FILM COATED ORAL at 07:44

## 2021-05-31 RX ADMIN — DULOXETINE HYDROCHLORIDE 60 MG: 30 CAPSULE, DELAYED RELEASE ORAL at 20:08

## 2021-05-31 RX ADMIN — COLESEVELAM HYDROCHLORIDE 625 MG: 625 TABLET, FILM COATED ORAL at 17:21

## 2021-05-31 RX ADMIN — GABAPENTIN 400 MG: 400 CAPSULE ORAL at 20:08

## 2021-05-31 RX ADMIN — MONTELUKAST 10 MG: 10 TABLET, FILM COATED ORAL at 20:09

## 2021-05-31 RX ADMIN — APIXABAN 5 MG: 5 TABLET, FILM COATED ORAL at 17:21

## 2021-05-31 RX ADMIN — COLESEVELAM HYDROCHLORIDE 625 MG: 625 TABLET, FILM COATED ORAL at 11:49

## 2021-05-31 RX ADMIN — LEVOTHYROXINE SODIUM 75 MCG: 0.05 TABLET ORAL at 20:08

## 2021-05-31 RX ADMIN — ESTRADIOL 0.5 MG: 1 TABLET ORAL at 05:17

## 2021-05-31 RX ADMIN — APIXABAN 5 MG: 5 TABLET, FILM COATED ORAL at 05:17

## 2021-05-31 RX ADMIN — ACETAMINOPHEN 650 MG: 325 TABLET ORAL at 20:09

## 2021-05-31 RX ADMIN — REGADENOSON 0.4 MG: 0.08 INJECTION, SOLUTION INTRAVENOUS at 08:56

## 2021-05-31 RX ADMIN — GABAPENTIN 400 MG: 400 CAPSULE ORAL at 14:50

## 2021-05-31 RX ADMIN — GABAPENTIN 400 MG: 400 CAPSULE ORAL at 10:04

## 2021-05-31 RX ADMIN — CARVEDILOL 6.25 MG: 6.25 TABLET, FILM COATED ORAL at 17:21

## 2021-05-31 ASSESSMENT — ENCOUNTER SYMPTOMS
DIZZINESS: 1
LOSS OF CONSCIOUSNESS: 1
WEAKNESS: 1

## 2021-05-31 ASSESSMENT — GAIT ASSESSMENTS
ASSISTIVE DEVICE: FRONT WHEEL WALKER
DISTANCE (FEET): 75
GAIT LEVEL OF ASSIST: MINIMAL ASSIST

## 2021-05-31 ASSESSMENT — COGNITIVE AND FUNCTIONAL STATUS - GENERAL
SUGGESTED CMS G CODE MODIFIER MOBILITY: CJ
MOBILITY SCORE: 22
CLIMB 3 TO 5 STEPS WITH RAILING: A LITTLE
WALKING IN HOSPITAL ROOM: A LITTLE

## 2021-05-31 ASSESSMENT — FIBROSIS 4 INDEX
FIB4 SCORE: 2.36
FIB4 SCORE: 2.36

## 2021-05-31 ASSESSMENT — PAIN DESCRIPTION - PAIN TYPE: TYPE: ACUTE PAIN

## 2021-05-31 NOTE — PROGRESS NOTES
Telemetry Shift Summary    Rhythm: SR/SB  HR: 50-90  Ectopy: R-PVC    Measurements for strip printed 5/31/2021 at 1326  HR 80  0.20 / 0.08 / 0.44    Normal Values  Rhythm: SR  HR:   Measurements: 0.12-0.20 / 0.06-0.10 / 0.30-0.52

## 2021-05-31 NOTE — PROGRESS NOTES
Hospital Medicine Daily Progress Note    Date of Service  5/31/2021    Chief Complaint  56 yo F with past medical history of Adrenal insufficiency, PE on eliquis since April 2021, Hypothyroidism, recurrent sinusitis, traumatic brain injury with seizure [id number], who was admitted here recently twice in the month of May for syncope work-up, who presents to the emergency department on 5/25/2021 after once again having to this syncopal episodes at home.     Patient reports she had recent endo follow up and was recommended to increase cortef 15mg daily for 10 days, then 10mg daily for 10 days and then 5mg daily. She denies missing any meds, but admits she took cortef 15mg in the evening yesterday, instead of in the morning.      Hospital Course  Reports passing out, dizziness while standing up, urinary incontinence. Generalized weakness. No tongue biting, no shaking activities.     CT head: neg for acute abnormalities  Echo 5/10/2021: normal LV EF>70%, normal regional wall motion.   Orthostatic hypotension: positive  Cortisol am: 2.7  TSH: normal    Cardiology consulted, syncope is likely orthostatic hypotension given history of [place]'s disease. On cortef per endo rec. Compressive stocking.     Noted EKG changes on 5/29 with T inversion on V2-V5. Patient reports chest pressure. Order stress test    On Meropenem and Eravacycline for recurrent sinusitis. Check respiratory culture neg.     PT/OT: SNF    Interval Problem Update  Patient reports some labile BP, dizziness with position changes.  PT recommended home health.  However patient feels her strength has been wax and wane, and feels weak and unsteady sometimes.  She is also in transition to get at home.  She wishes to go to SNF at discharge.  We'll have a PT OT evaluation    Denies fever, chills, chest pain, shortness breath, nausea, vomiting, abdominal pain    Stress test showed mild ischemia changes, follow-up with cardiology a.m. for further  recommendations    All Data, Medication data reviewed.  Case discussed with nursing, CM,  nurse, pharmacy in IDT rounds.     Consultants/Specialty  cardiology    Code Status  Full Code    Disposition  SNF    Review of Systems  Review of Systems   Neurological: Positive for dizziness, loss of consciousness and weakness.   All other systems reviewed and are negative.       Physical Exam  Temp:  [36.3 °C (97.4 °F)-36.8 °C (98.3 °F)] 36.3 °C (97.4 °F)  Pulse:  [65-90] 72  Resp:  [16-20] 16  BP: ()/(63-94) 108/63  SpO2:  [97 %-100 %] 97 %    Physical Exam  Vitals and nursing note reviewed.   Constitutional:       Appearance: Normal appearance. She is obese. She is ill-appearing.   HENT:      Head: Normocephalic and atraumatic.      Nose: Nose normal.      Mouth/Throat:      Mouth: Mucous membranes are dry.      Pharynx: Oropharynx is clear.   Eyes:      Extraocular Movements: Extraocular movements intact.      Conjunctiva/sclera: Conjunctivae normal.      Pupils: Pupils are equal, round, and reactive to light.   Cardiovascular:      Rate and Rhythm: Normal rate and regular rhythm.      Pulses: Normal pulses.      Heart sounds: Normal heart sounds.   Pulmonary:      Effort: Pulmonary effort is normal. No respiratory distress.      Breath sounds: Normal breath sounds. No wheezing.   Abdominal:      General: Abdomen is flat. Bowel sounds are normal.      Palpations: Abdomen is soft.   Musculoskeletal:         General: No swelling. Normal range of motion.      Cervical back: Normal range of motion and neck supple.      Comments: Compression stocking noted on BLE   Skin:     General: Skin is warm and dry.   Neurological:      General: No focal deficit present.      Mental Status: She is alert and oriented to person, place, and time. Mental status is at baseline.   Psychiatric:         Mood and Affect: Mood normal.         Behavior: Behavior normal.         Fluids  No intake or output data in the 24 hours ending  05/31/21 1559    Laboratory  Recent Labs     05/29/21  0809   WBC 6.8   RBC 4.02*   HEMOGLOBIN 12.2   HEMATOCRIT 38.4   MCV 95.5   MCH 30.3   MCHC 31.8*   RDW 63.8*   PLATELETCT 113*   MPV 13.1*     Recent Labs     05/29/21  0809   SODIUM 134*   POTASSIUM 4.4   CHLORIDE 105   CO2 20   GLUCOSE 85   BUN 16   CREATININE 0.59   CALCIUM 7.8*                   Imaging  NM-CARDIAC STRESS TEST   Final Result      US-EXTREMITY VENOUS UPPER BILAT   Final Result      DX-ANKLE 2- VIEWS RIGHT   Final Result      Interval medial and lateral malleolus fracture fixation with no new displacement. The fractures have not fully healed      Complete chronic subtalar fusion. Calcaneal cuboid and talonavicular fixation plates in place, mature fusion not yet present      DX-CHEST-PORTABLE (1 VIEW)   Final Result      Stable chest      CT-CSPINE WITHOUT PLUS RECONS   Final Result      No CT evidence of acute cervical spine abnormality.      Mild dextro scoliotic curvature at the cervicothoracic junction and there is facet arthropathy      CT-HEAD W/O   Final Result      No acute intracranial abnormality is identified.      Prior sinus operative procedure with some residual left maxillary mucosal thickening      Mild white matter hypodensity is present.  This is a nonspecific finding which usually is found to represent chronic microvascular disease in patient's of this demographic.  Demyelination, age indeterminant ischemia and gliosis are also common    possibilities.           Assessment/Plan  * Syncope  Assessment & Plan  -2 recent admissions for syncope,.likely sec to orthostatic hypotension in the setting of adrenal insuffiencey.   -Orthostatic vital sign positive  -Normal Head CT.  -Echo 5/10: Left ventricular ejection fraction is visually estimated to be >70%. Normal regional wall motion  -Resume cortef 15mg daily  -Cardiology consulted, rec appreciated  -Less likely seizure, I discussed the case with neurology   Ansari  -nonpharmacologic interventions: compression stocking, avoid dehydration, staged position changes, discussed with patient  -PT/OT: SNF  -Tele    T wave inversion in EKG  Assessment & Plan  V2-V5 T inversion Noted on EKG 5/29, more pronounced than prior. EKG personally reviewed  Reports intermittent chest pain.  Echo normal range of wall motion.  Stress test showed mild ischemia changes, follow-up cardiology a.m. for further recommendations      Transaminitis  Assessment & Plan  Hepatitis panel negative  Total bilirubin nomal  Cont monitor LFT    Trending down    Recurrent sinusitis  Assessment & Plan  [facility]  Has been on Meropenem and Eravacycline until 5/31/2021  Will continue these    Adrenal insufficiency ([place]'s disease) (HCC)- (present on admission)  Assessment & Plan  Cortisol level am 2.7, low  Reports recently her endo md Dr. Jfef inc Cortef to 15mg daily for 10 days, then 10mg daily for 10 days and then 5 mg daily.   Cont Cortef 15mg daily now    Acute pulmonary embolism (HCC)- (present on admission)  Assessment & Plan  Hx of PE on eliquis since April 2021.   Cont eliquis  Patient is advised to follow up with hematology as outpatient to discuss the duration of eliquis    Thyroid disease- (present on admission)  Assessment & Plan  TSH normal  Continue levothyroxine.    Orthostatic hypotension  Assessment & Plan  - hold lasix  - see above plans    Obesity (BMI 35.0-39.9 without comorbidity)  Assessment & Plan  Life style modifications including healthy diet and regular exercise recommended     Chronic systolic heart failure (HCC)- (present on admission)  Assessment & Plan  -ProBNP 2165  -Echo 5/10/2021: normal LV EF>70%, normal regional wall motion.  -no sign of acute CHF exacerbation       VTE prophylaxis: eliquis

## 2021-05-31 NOTE — CARE PLAN
Problem: Communication  Goal: The ability to communicate needs accurately and effectively will improve  Outcome: Progressing  Note: Pt communicates appropriately with treatment team about needs     Problem: Self Care  Goal: Patient will have the ability to perform ADLs independently or with assistance (bathe, groom, dress, toilet and feed)  Outcome: Progressing  Note: PT/OT recommend  for pt dishcarge     The patient is Watcher - Medium risk of patient condition declining or worsening    Shift Goals  Clinical Goals: get stress test done  Patient Goals: discharge planning  Family Goals: build up strength    Progress made toward(s) clinical / shift goals:  pt currently at stress test at this time (0915); work w/ SW or RN CM and MD for possible dc and dc planning

## 2021-05-31 NOTE — PROCEDURES
Nursing care plan includes knowledge deficit, potential for discomfort, potential for compromised cardiac output. POC includes teaching, comfort measures and reassurance, and access to code cart, cardiology stand by and availability of rapid response team. Pt verbalizes good understanding of benefits and risks of pharmacological cardiac stress test. Informed consent obtained. Lexiscan given, pt developed mild tightness in chest and denies additional cardiac symptoms. Baseline EKG shows sinus rhythm with t-wave abnormality, and no significant changes or findings throughout exam. VS stable, symptoms resolved. To waiting room, caffeinated fluids and/or snacks given. Nursing goals met.

## 2021-05-31 NOTE — THERAPY
Physical Therapy   Daily Treatment     Patient Name: Donna Isaac  Age:  55 y.o., Sex:  female  Medical Record #: [id number]  Today's Date: 5/31/2021     Precautions: (P) Fall Risk    Assessment    Pt participatory with PT today. She had elevated heart rate with ambulation with nurse asking to decrease exertion. Discussed attention to exertion level with use of breathlessness as indicator for need for standing or seated rest. Unable to perform stair training due to elevated heart rate today. Pt participated with LE exercise at bedside. Pt has not been able to demonstrate safety with stairs, as required to enter home. She continues to be limited by activity tolerance, and will benefit from further PT in the post-acute setting prior to D/C home.    Plan    Continue current treatment plan.    DC Equipment Recommendations: (P) None  Discharge Recommendations: (P) Recommend post-acute placement for additional physical therapy services prior to discharge home      Subjective    Pt stating she would like to go to SNF to help allow medical team to manage her care, and concern over access to home due to difficulty with stairs.     Objective       05/31/21 1417   Sitting Lower Body Exercises   Sitting Lower Body Exercises Yes   Long Arc Quad 1 set of 10;Bilateral   Marching Reciprocal;1 set of 10   Other Exercises scapular retraction x10   Comments cues for slow, controlled motions   Balance   Sitting Balance (Static) Good   Sitting Balance (Dynamic) Good   Standing Balance (Static) Fair +   Standing Balance (Dynamic) Fair +   Weight Shift Sitting Good   Weight Shift Standing Good   Skilled Intervention Postural Facilitation   Comments with FWW   Gait Analysis   Gait Level Of Assist Minimal Assist  (SBA)   Assistive Device Front Wheel Walker   Distance (Feet) 75   # of Times Distance was Traveled 2   Deviation   (forward flexed)   # of Stairs Climbed 0   Weight Bearing Status no restriction   Skilled Intervention  Compensatory Strategies;Verbal Cuing;Sequencing   Comments stairs not addressed due to pt with heart rate in the 140s during ambulation, and RN requesting to decrease exertion   Bed Mobility    Supine to Sit Modified Independent   Sit to Supine Modified Independent   Scooting Modified Independent   Functional Mobility   Sit to Stand Supervised   Toilet Transfers Supervised   Transfer Method Stand Step   Mobility supine->sit EOB->stand->amb->toilet->amb->chair->amb->seated EOB->supine   Comments cues for hand placement   How much difficulty does the patient currently have...   Turning over in bed (including adjusting bedclothes, sheets and blankets)? 4   Sitting down on and standing up from a chair with arms (e.g., wheelchair, bedside commode, etc.) 4   Moving from lying on back to sitting on the side of the bed? 4   How much help from another person does the patient currently need...   Moving to and from a bed to a chair (including a wheelchair)? 4   Need to walk in a hospital room? 3   Climbing 3-5 steps with a railing? 3   6 clicks Mobility Score 22   Activity Tolerance   Sitting in Chair declined   Sitting Edge of Bed 9 minutes approx   Standing 2x5 minutes approx   Comments limited by SOB, elevated heart rate   Patient / Family Goals    Patient / Family Goal #1 SNF   Short Term Goals    Short Term Goal # 1 Pt to be I with transfers in 4 V so can go home   Goal Outcome # 1 Progressing as expected   Short Term Goal # 2 Pt to be S with amb x 200 feet in 4 V so can go home   Goal Outcome # 2 Goal not met   Short Term Goal # 3 Pt to be Min A x 2 stairs in 4 Vso can get into house   Goal Outcome # 3 Goal not met   Education Group   Education Provided Role of Physical Therapist   Role of Physical Therapist Patient Response Patient;Acceptance;Explanation;Verbal Demonstration   Anticipated Discharge Equipment and Recommendations   DC Equipment Recommendations None   Discharge Recommendations Recommend post-acute  placement for additional physical therapy services prior to discharge home

## 2021-05-31 NOTE — PROGRESS NOTES
Monitor Summary     Rhythm: SR 62-89, Hit 49  Measurements: 0.20/0.10/0.44  ECTOPIES: r-oPVC, BBB        Normal Values  Rhythm SR  HR Range    Measurements 0.12-0.20 / 0.06-0.10  / 0.30-0.52

## 2021-06-01 LAB
BACTERIA SPEC RESP CULT: NORMAL
GRAM STN SPEC: NORMAL
SIGNIFICANT IND 70042: NORMAL
SITE SITE: NORMAL
SOURCE SOURCE: NORMAL

## 2021-06-01 PROCEDURE — A9270 NON-COVERED ITEM OR SERVICE: HCPCS | Performed by: STUDENT IN AN ORGANIZED HEALTH CARE EDUCATION/TRAINING PROGRAM

## 2021-06-01 PROCEDURE — 700102 HCHG RX REV CODE 250 W/ 637 OVERRIDE(OP): Performed by: HOSPITALIST

## 2021-06-01 PROCEDURE — 770020 HCHG ROOM/CARE - TELE (206)

## 2021-06-01 PROCEDURE — 97535 SELF CARE MNGMENT TRAINING: CPT

## 2021-06-01 PROCEDURE — 700102 HCHG RX REV CODE 250 W/ 637 OVERRIDE(OP): Performed by: STUDENT IN AN ORGANIZED HEALTH CARE EDUCATION/TRAINING PROGRAM

## 2021-06-01 PROCEDURE — 97530 THERAPEUTIC ACTIVITIES: CPT

## 2021-06-01 PROCEDURE — A9270 NON-COVERED ITEM OR SERVICE: HCPCS | Performed by: HOSPITALIST

## 2021-06-01 PROCEDURE — 99232 SBSQ HOSP IP/OBS MODERATE 35: CPT | Performed by: STUDENT IN AN ORGANIZED HEALTH CARE EDUCATION/TRAINING PROGRAM

## 2021-06-01 RX ADMIN — APIXABAN 5 MG: 5 TABLET, FILM COATED ORAL at 05:20

## 2021-06-01 RX ADMIN — ESTRADIOL 0.5 MG: 1 TABLET ORAL at 05:20

## 2021-06-01 RX ADMIN — COLESEVELAM HYDROCHLORIDE 625 MG: 625 TABLET, FILM COATED ORAL at 08:43

## 2021-06-01 RX ADMIN — MONTELUKAST 10 MG: 10 TABLET, FILM COATED ORAL at 20:59

## 2021-06-01 RX ADMIN — COLESEVELAM HYDROCHLORIDE 625 MG: 625 TABLET, FILM COATED ORAL at 18:09

## 2021-06-01 RX ADMIN — CARVEDILOL 6.25 MG: 6.25 TABLET, FILM COATED ORAL at 18:09

## 2021-06-01 RX ADMIN — COLESEVELAM HYDROCHLORIDE 625 MG: 625 TABLET, FILM COATED ORAL at 12:27

## 2021-06-01 RX ADMIN — DULOXETINE HYDROCHLORIDE 60 MG: 30 CAPSULE, DELAYED RELEASE ORAL at 20:59

## 2021-06-01 RX ADMIN — GABAPENTIN 400 MG: 400 CAPSULE ORAL at 20:59

## 2021-06-01 RX ADMIN — GABAPENTIN 400 MG: 400 CAPSULE ORAL at 16:08

## 2021-06-01 RX ADMIN — CARVEDILOL 6.25 MG: 6.25 TABLET, FILM COATED ORAL at 08:43

## 2021-06-01 RX ADMIN — LEVOTHYROXINE SODIUM 75 MCG: 0.05 TABLET ORAL at 20:59

## 2021-06-01 RX ADMIN — HYDROCORTISONE 15 MG: 10 TABLET ORAL at 12:28

## 2021-06-01 RX ADMIN — ACETAMINOPHEN 650 MG: 325 TABLET ORAL at 18:12

## 2021-06-01 RX ADMIN — GABAPENTIN 400 MG: 400 CAPSULE ORAL at 08:43

## 2021-06-01 RX ADMIN — APIXABAN 5 MG: 5 TABLET, FILM COATED ORAL at 18:09

## 2021-06-01 ASSESSMENT — COGNITIVE AND FUNCTIONAL STATUS - GENERAL
HELP NEEDED FOR BATHING: A LOT
DAILY ACTIVITIY SCORE: 17
DRESSING REGULAR UPPER BODY CLOTHING: A LITTLE
PERSONAL GROOMING: A LITTLE
TOILETING: A LITTLE
SUGGESTED CMS G CODE MODIFIER DAILY ACTIVITY: CK
DRESSING REGULAR LOWER BODY CLOTHING: A LOT

## 2021-06-01 ASSESSMENT — PAIN DESCRIPTION - PAIN TYPE
TYPE: ACUTE PAIN
TYPE: CHRONIC PAIN;ACUTE PAIN
TYPE: ACUTE PAIN

## 2021-06-01 ASSESSMENT — ENCOUNTER SYMPTOMS
DIZZINESS: 1
WEAKNESS: 1
LOSS OF CONSCIOUSNESS: 1

## 2021-06-01 ASSESSMENT — GAIT ASSESSMENTS: DISTANCE (FEET): 10

## 2021-06-01 NOTE — PROGRESS NOTES
Monitor Summary     Rhythm:SR-ST   Measurements: 0.20/0.08/0.36  ECTOPIES: r-oPVC        Normal Values  Rhythm SR  HR Range    Measurements 0.12-0.20 / 0.06-0.10  / 0.30-0.52

## 2021-06-01 NOTE — CARE PLAN
The patient is Stable - Low risk of patient condition declining or worsening    Shift Goals  Clinical Goals: Moniter BP and HR - no drastic fluctuations. In BP or heart rate.   Patient Goals: Discharge to a Skilled Nursing Facility       Progress made toward(s) clinical / shift goals: Pt BP has been in the 130's-150's and HR in the 80's with no  drastic changes thus far.       Problem: Hemodynamics  Goal: Patient's hemodynamics, fluid balance and neurologic status will be stable or improve  Outcome: Progressing    Problem: Discharge Barriers/Planning  Goal: Patient's continuum of care needs are met  Outcome: Progressing       Patient is not progressing towards the following goals:

## 2021-06-01 NOTE — PROGRESS NOTES
Hospital Medicine Daily Progress Note    Date of Service  6/1/2021    Chief Complaint  54 yo F with past medical history of Adrenal insufficiency, PE on eliquis since April 2021, Hypothyroidism, recurrent sinusitis, traumatic brain injury with seizure [id number], who was admitted here recently twice in the month of May for syncope work-up, who presents to the emergency department on 5/25/2021 after once again having to this syncopal episodes at home.     Patient reports she had recent endo follow up and was recommended to increase cortef 15mg daily for 10 days, then 10mg daily for 10 days and then 5mg daily. She denies missing any meds, but admits she took cortef 15mg in the evening yesterday, instead of in the morning.      Hospital Course  Reports passing out, dizziness while standing up, urinary incontinence. Generalized weakness. No tongue biting, no shaking activities.     CT head: neg for acute abnormalities  Echo 5/10/2021: normal LV EF>70%, normal regional wall motion.   Orthostatic hypotension: positive  Cortisol am: 2.7  TSH: normal    Cardiology consulted, syncope is likely orthostatic hypotension given history of [place]'s disease. On cortef per endo rec. Compressive stocking.     Noted EKG changes on 5/29 with T inversion on V2-V5. Patient reports chest pressure. Order stress test    On Meropenem and Eravacycline for recurrent sinusitis. Check respiratory culture neg.     PT/OT: SNF    Interval Problem Update  Patient seen and examined at bedside this morning.  No acute events overnight.     Stress test showed mild ischemia changes, will follow with cardiology for further recs.     All Data, Medication data reviewed.  Case discussed with nursing, CM,  nurse, pharmacy in IDT rounds.     Consultants/Specialty  cardiology    Code Status  Full Code    Disposition  [facility]    Review of Systems  Review of Systems   Neurological: Positive for dizziness, loss of consciousness and weakness.   All other systems  reviewed and are negative.       Physical Exam  Temp:  [36.3 °C (97.4 °F)-36.7 °C (98 °F)] 36.4 °C (97.6 °F)  Pulse:  [62-84] 62  Resp:  [16-18] 18  BP: (108-156)/(63-96) 108/75  SpO2:  [95 %-100 %] 95 %    Physical Exam  Vitals and nursing note reviewed.   Constitutional:       Appearance: Normal appearance. She is obese. She is ill-appearing.   HENT:      Head: Normocephalic and atraumatic.      Nose: Nose normal.      Mouth/Throat:      Mouth: Mucous membranes are dry.      Pharynx: Oropharynx is clear.   Eyes:      Extraocular Movements: Extraocular movements intact.      Conjunctiva/sclera: Conjunctivae normal.      Pupils: Pupils are equal, round, and reactive to light.   Cardiovascular:      Rate and Rhythm: Normal rate and regular rhythm.      Pulses: Normal pulses.      Heart sounds: Normal heart sounds.   Pulmonary:      Effort: Pulmonary effort is normal. No respiratory distress.      Breath sounds: Normal breath sounds. No wheezing.   Abdominal:      General: Abdomen is flat. Bowel sounds are normal.      Palpations: Abdomen is soft.   Musculoskeletal:         General: No swelling. Normal range of motion.      Cervical back: Normal range of motion and neck supple.      Comments: Compression stocking noted on BLE   Skin:     General: Skin is warm and dry.   Neurological:      General: No focal deficit present.      Mental Status: She is alert and oriented to person, place, and time. Mental status is at baseline.   Psychiatric:         Mood and Affect: Mood normal.         Behavior: Behavior normal.         Fluids  No intake or output data in the 24 hours ending 06/01/21 1115    Laboratory                        Imaging  NM-CARDIAC STRESS TEST   Final Result      US-EXTREMITY VENOUS UPPER BILAT   Final Result      DX-ANKLE 2- VIEWS RIGHT   Final Result      Interval medial and lateral malleolus fracture fixation with no new displacement. The fractures have not fully healed      Complete chronic subtalar  fusion. Calcaneal cuboid and talonavicular fixation plates in place, mature fusion not yet present      DX-CHEST-PORTABLE (1 VIEW)   Final Result      Stable chest      CT-CSPINE WITHOUT PLUS RECONS   Final Result      No CT evidence of acute cervical spine abnormality.      Mild dextro scoliotic curvature at the cervicothoracic junction and there is facet arthropathy      CT-HEAD W/O   Final Result      No acute intracranial abnormality is identified.      Prior sinus operative procedure with some residual left maxillary mucosal thickening      Mild white matter hypodensity is present.  This is a nonspecific finding which usually is found to represent chronic microvascular disease in patient's of this demographic.  Demyelination, age indeterminant ischemia and gliosis are also common    possibilities.           Assessment/Plan  * Syncope  Assessment & Plan  -2 recent admissions for syncope,.likely sec to orthostatic hypotension in the setting of adrenal insuffiencey.   -Orthostatic vital sign positive  -Normal Head CT.  -Echo 5/10: Left ventricular ejection fraction is visually estimated to be >70%. Normal regional wall motion  -Resume cortef 15mg daily  -Cardiology consulted, rec appreciated  -Less likely seizure, I discussed the case with neurology Dr. Ansari  -nonpharmacologic interventions: compression stocking, avoid dehydration, staged position changes, discussed with patient  -PT/OT: SNF  -Tele    T wave inversion in EKG  Assessment & Plan  V2-V5 T inversion Noted on EKG 5/29, more pronounced than prior. EKG personally reviewed  Reports intermittent chest pain.  Echo normal range of wall motion.  Stress test showed mild ischemia changes, follow-up cardiology a.m. for further recommendations      Transaminitis  Assessment & Plan  Hepatitis panel negative  Total bilirubin nomal  Cont monitor LFT    Trending down    Recurrent sinusitis  Assessment & Plan  [facility]  Has been on Meropenem and  Eravacycline until 5/31/2021  Will continue these    Adrenal insufficiency ([place]'s disease) (HCC)- (present on admission)  Assessment & Plan  Cortisol level am 2.7, low  Reports recently her endo md Dr. Jeff inc Cortef to 15mg daily for 10 days, then 10mg daily for 10 days and then 5 mg daily.   Cont Cortef 15mg daily now    Acute pulmonary embolism (HCC)- (present on admission)  Assessment & Plan  Hx of PE on eliquis since April 2021.   Cont eliquis  Patient is advised to follow up with hematology as outpatient to discuss the duration of eliquis    Thyroid disease- (present on admission)  Assessment & Plan  TSH normal  Continue levothyroxine.    Orthostatic hypotension  Assessment & Plan  - hold lasix  - see above plans    Obesity (BMI 35.0-39.9 without comorbidity)  Assessment & Plan  Life style modifications including healthy diet and regular exercise recommended     Chronic systolic heart failure (HCC)- (present on admission)  Assessment & Plan  -ProBNP 2165  -Echo 5/10/2021: normal LV EF>70%, normal regional wall motion.  -no sign of acute CHF exacerbation       VTE prophylaxis: eliquis

## 2021-06-01 NOTE — THERAPY
"Occupational Therapy  Daily Treatment     Patient Name: Donna Isaac  Age:  55 y.o., Sex:  female  Medical Record #: [id number]  Today's Date: 6/1/2021     Precautions  Precautions: Fall Risk  Comments: h/o syncope    Assessment    Pt seen for OT tx today, 79/50mmHg in supine, 81/51mmHg in seated level. Pt continues to present with lightheadedness/dizziness when up OOB/ambulating, but no LOB or syncopal episode noted during this visit. Pt education provided re: home safety, modification of dressing techniques, and safe ADL txfs. Pt will continue to benefit from acute OT in house, as well as post acute placement prior to dc home. She is unsafe to dc home at this time and presents at high risk for falls.     Plan    Continue current treatment plan.    DC Equipment Recommendations: Other (Comments) (dressing kit)  Discharge Recommendations: Recommend post-acute placement for additional occupational therapy services prior to discharge home    Subjective    \"I'd get up for you.\"     Objective       06/01/21 1220   Vitals   Blood Pressure (!) 81/51  (seated BP)   Pain   Pain Scales 0 to 10 Scale    Intervention Warm Pack;Declines   Pain 0 - 10 Group   Therapist Pain Assessment Post Activity Pain Same as Prior to Activity  (headache resolved by warm pack application, per pt)   Non Verbal Descriptors   Non Verbal Scale  Calm;Unlabored Breathing   Cognition    Cognition / Consciousness WDL   Level of Consciousness Alert   Comments pleasant and cooperative   Passive ROM Upper Body   Passive ROM Upper Body WDL   Active ROM Upper Body   Active ROM Upper Body  WDL   Strength Upper Body   Upper Body Strength  WDL   Other Treatments   Other Treatments Provided encouraged activity pacing and energy conservation techniques related to ADLs, and modification of dressing techniques to reduce postural change and dizziness.    Balance   Sitting Balance (Static) Good   Sitting Balance (Dynamic) Good   Standing Balance (Static) Fair " +   Standing Balance (Dynamic) Fair +   Weight Shift Sitting Good   Weight Shift Standing Good   Skilled Intervention Postural Facilitation;Compensatory Strategies   Comments with FWW   Bed Mobility    Supine to Sit Supervised   Sit to Supine Supervised   Scooting Supervised   Skilled Intervention Postural Facilitation;Compensatory Strategies   Activities of Daily Living   Eating Independent   Grooming Standing;Supervision   Upper Body Dressing Supervision   Lower Body Dressing Moderate Assist   Toileting Supervision   Skilled Intervention Compensatory Strategies;Postural Facilitation   How much help from another person does the patient currently need...   Putting on and taking off regular lower body clothing? 2   Bathing (including washing, rinsing, and drying)? 2   Toileting, which includes using a toilet, bedpan, or urinal? 3   Putting on and taking off regular upper body clothing? 3   Taking care of personal grooming such as brushing teeth? 3   Eating meals? 4   6 Clicks Daily Activity Score 17   Functional Mobility   Sit to Stand Supervised  (SBA)   Bed, Chair, Wheelchair Transfer Supervised  (SBA)   Transfer Method Stand Step   Mobility in room with FWW then HHA back to bed   Distance (Feet) 10   # of Times Distance was Traveled 2   Skilled Intervention Compensatory Strategies;Postural Facilitation   Activity Tolerance   Sitting in Chair NT   Sitting Edge of Bed 15 mins; up in chair for lunch post session   Standing 5mins x 2 total   Comments limited by BP fluctuations and dizziness   Patient / Family Goals   Patient / Family Goal #1 go to rehab   Short Term Goals   Short Term Goal # 1 Pt ana paula dress supervuised with  setup and AE in 4 visits   Goal Outcome # 1 Progressing as expected   Short Term Goal # 2 Pt will tolerate 10 min standing for ADl's with supervision in 5 visits   Goal Outcome # 2 Progressing slower than expected   Short Term Goal # 3 Pt will tolerate OOB to chair 3 hours/day for meals in 4 visits    Goal Outcome # 3 Progressing slower than expected   Education Group   Education Provided Energy Conservation;Activities of Daily Living   Energy Conservation Patient Response Patient;Acceptance;Explanation;Verbal Demonstration;Action Demonstration   ADL Patient Response Patient;Acceptance;Explanation;Verbal Demonstration   Anticipated Discharge Equipment and Recommendations   DC Equipment Recommendations Other (Comments)  (dressing kit)   Discharge Recommendations Recommend post-acute placement for additional occupational therapy services prior to discharge home   Interdisciplinary Plan of Care Collaboration   IDT Collaboration with  Nursing;Certified Nursing Assistant   Patient Position at End of Therapy Seated;Edge of Bed;Call Light within Reach;Tray Table within Reach;Phone within Reach   Collaboration Comments RN aware of OT session   Session Information   Date / Session Number  6/1 #2 (2/3, 6/2) LF   Priority 2

## 2021-06-02 PROBLEM — R94.39 ABNORMAL NUCLEAR STRESS TEST: Status: ACTIVE | Noted: 2021-05-30

## 2021-06-02 PROCEDURE — A9270 NON-COVERED ITEM OR SERVICE: HCPCS | Performed by: STUDENT IN AN ORGANIZED HEALTH CARE EDUCATION/TRAINING PROGRAM

## 2021-06-02 PROCEDURE — A9270 NON-COVERED ITEM OR SERVICE: HCPCS | Performed by: HOSPITALIST

## 2021-06-02 PROCEDURE — 700102 HCHG RX REV CODE 250 W/ 637 OVERRIDE(OP): Performed by: STUDENT IN AN ORGANIZED HEALTH CARE EDUCATION/TRAINING PROGRAM

## 2021-06-02 PROCEDURE — 770020 HCHG ROOM/CARE - TELE (206)

## 2021-06-02 PROCEDURE — 700102 HCHG RX REV CODE 250 W/ 637 OVERRIDE(OP): Performed by: HOSPITALIST

## 2021-06-02 PROCEDURE — 97116 GAIT TRAINING THERAPY: CPT

## 2021-06-02 PROCEDURE — 99232 SBSQ HOSP IP/OBS MODERATE 35: CPT | Performed by: STUDENT IN AN ORGANIZED HEALTH CARE EDUCATION/TRAINING PROGRAM

## 2021-06-02 RX ORDER — ASPIRIN 81 MG/1
81 TABLET, CHEWABLE ORAL DAILY
Status: DISCONTINUED | OUTPATIENT
Start: 2021-06-03 | End: 2021-06-03 | Stop reason: HOSPADM

## 2021-06-02 RX ADMIN — COLESEVELAM HYDROCHLORIDE 625 MG: 625 TABLET, FILM COATED ORAL at 16:49

## 2021-06-02 RX ADMIN — MONTELUKAST 10 MG: 10 TABLET, FILM COATED ORAL at 21:25

## 2021-06-02 RX ADMIN — Medication 1 LOZENGE: at 11:46

## 2021-06-02 RX ADMIN — ACETAMINOPHEN 650 MG: 325 TABLET ORAL at 16:24

## 2021-06-02 RX ADMIN — CARVEDILOL 6.25 MG: 6.25 TABLET, FILM COATED ORAL at 08:15

## 2021-06-02 RX ADMIN — CARVEDILOL 6.25 MG: 6.25 TABLET, FILM COATED ORAL at 16:48

## 2021-06-02 RX ADMIN — APIXABAN 5 MG: 5 TABLET, FILM COATED ORAL at 05:24

## 2021-06-02 RX ADMIN — DULOXETINE HYDROCHLORIDE 60 MG: 30 CAPSULE, DELAYED RELEASE ORAL at 21:25

## 2021-06-02 RX ADMIN — LEVOTHYROXINE SODIUM 75 MCG: 0.05 TABLET ORAL at 21:25

## 2021-06-02 RX ADMIN — COLESEVELAM HYDROCHLORIDE 625 MG: 625 TABLET, FILM COATED ORAL at 08:15

## 2021-06-02 RX ADMIN — GABAPENTIN 400 MG: 400 CAPSULE ORAL at 08:14

## 2021-06-02 RX ADMIN — COLESEVELAM HYDROCHLORIDE 625 MG: 625 TABLET, FILM COATED ORAL at 11:22

## 2021-06-02 RX ADMIN — ESTRADIOL 0.5 MG: 1 TABLET ORAL at 05:24

## 2021-06-02 RX ADMIN — APIXABAN 5 MG: 5 TABLET, FILM COATED ORAL at 17:17

## 2021-06-02 RX ADMIN — HYDROCORTISONE 15 MG: 10 TABLET ORAL at 11:47

## 2021-06-02 RX ADMIN — ACETAMINOPHEN 650 MG: 325 TABLET ORAL at 22:31

## 2021-06-02 RX ADMIN — GABAPENTIN 400 MG: 400 CAPSULE ORAL at 15:11

## 2021-06-02 RX ADMIN — GABAPENTIN 400 MG: 400 CAPSULE ORAL at 21:25

## 2021-06-02 ASSESSMENT — COGNITIVE AND FUNCTIONAL STATUS - GENERAL
SUGGESTED CMS G CODE MODIFIER MOBILITY: CJ
MOBILITY SCORE: 22
CLIMB 3 TO 5 STEPS WITH RAILING: A LOT

## 2021-06-02 ASSESSMENT — GAIT ASSESSMENTS
GAIT LEVEL OF ASSIST: SUPERVISED
DISTANCE (FEET): 200
DEVIATION: OTHER (COMMENT)
ASSISTIVE DEVICE: FRONT WHEEL WALKER

## 2021-06-02 ASSESSMENT — ENCOUNTER SYMPTOMS
WEAKNESS: 1
LOSS OF CONSCIOUSNESS: 1
DIZZINESS: 1

## 2021-06-02 ASSESSMENT — PAIN DESCRIPTION - PAIN TYPE
TYPE: ACUTE PAIN
TYPE: ACUTE PAIN;CHRONIC PAIN
TYPE: ACUTE PAIN
TYPE: ACUTE PAIN
TYPE: ACUTE PAIN;CHRONIC PAIN

## 2021-06-02 NOTE — PROGRESS NOTES
Received bedside report from AILYN White. Pt resting comfortably, no current needs, universal fall and safety precautions in place, bed in lowest position and call light within reach.   CNA assisted pt to bathroom, pt now resting in bed.

## 2021-06-02 NOTE — PROGRESS NOTES
[facility] Medicine Daily Progress Note    Date of Service  6/2/2021    Chief Complaint  56 yo F with past medical history of Adrenal insufficiency, PE on eliquis since April 2021, Hypothyroidism, recurrent sinusitis, traumatic brain injury with seizure [id number], who was admitted here recently twice in the month of May for syncope work-up, who presents to the emergency department on 5/25/2021 after once again having to this syncopal episodes at home.     Interval Problem Update  Patient seen and examined at bedside this morning.  No acute events overnight.     Stress test showed mild ischemia changes- Cardiology will follow up in the clinic.   Pending d/c to SNF.  Medically stable.     All Data, Medication data reviewed.  Case discussed with nursing, CM,  nurse, pharmacy in IDT rounds.     Consultants/Specialty  cardiology    Code Status  Full Code    Disposition  SNF    Review of Systems  Review of Systems   Neurological: Positive for dizziness, loss of consciousness and weakness.   All other systems reviewed and are negative.       Physical Exam  Temp:  [36.2 °C (97.2 °F)-36.6 °C (97.9 °F)] 36.6 °C (97.9 °F)  Pulse:  [69-81] 81  Resp:  [18] 18  BP: ()/() 155/106  SpO2:  [94 %-97 %] 94 %    Physical Exam  Vitals and nursing note reviewed.   Constitutional:       Appearance: Normal appearance. She is obese. She is ill-appearing.   HENT:      Head: Normocephalic and atraumatic.      Nose: Nose normal.      Mouth/Throat:      Mouth: Mucous membranes are dry.      Pharynx: Oropharynx is clear.   Eyes:      Extraocular Movements: Extraocular movements intact.      Conjunctiva/sclera: Conjunctivae normal.      Pupils: Pupils are equal, round, and reactive to light.   Cardiovascular:      Rate and Rhythm: Normal rate and regular rhythm.      Pulses: Normal pulses.      Heart sounds: Normal heart sounds.   Pulmonary:      Effort: Pulmonary effort is normal. No respiratory distress.      Breath sounds:  Normal breath sounds. No wheezing.   Abdominal:      General: Abdomen is flat. Bowel sounds are normal.      Palpations: Abdomen is soft.   Musculoskeletal:         General: No swelling. Normal range of motion.      Cervical back: Normal range of motion and neck supple.      Comments: Compression stocking noted on BLE   Skin:     General: Skin is warm and dry.   Neurological:      General: No focal deficit present.      Mental Status: She is alert and oriented to person, place, and time. Mental status is at baseline.   Psychiatric:         Mood and Affect: Mood normal.         Behavior: Behavior normal.         Fluids  No intake or output data in the 24 hours ending 06/02/21 1026    Laboratory                        Imaging  NM-CARDIAC STRESS TEST   Final Result      US-EXTREMITY VENOUS UPPER BILAT   Final Result      DX-ANKLE 2- VIEWS RIGHT   Final Result      Interval medial and lateral malleolus fracture fixation with no new displacement. The fractures have not fully healed      Complete chronic subtalar fusion. Calcaneal cuboid and talonavicular fixation plates in place, mature fusion not yet present      DX-CHEST-PORTABLE (1 VIEW)   Final Result      Stable chest      CT-CSPINE WITHOUT PLUS RECONS   Final Result      No CT evidence of acute cervical spine abnormality.      Mild dextro scoliotic curvature at the cervicothoracic junction and there is facet arthropathy      CT-HEAD W/O   Final Result      No acute intracranial abnormality is identified.      Prior sinus operative procedure with some residual left maxillary mucosal thickening      Mild white matter hypodensity is present.  This is a nonspecific finding which usually is found to represent chronic microvascular disease in patient's of this demographic.  Demyelination, age indeterminant ischemia and gliosis are also common    possibilities.           Assessment/Plan  * Syncope  Assessment & Plan  -2 recent admissions for syncope,.likely sec to  orthostatic hypotension in the setting of adrenal insuffiencey.   -Orthostatic vital sign positive  -Normal Head CT.  -Echo 5/10: Left ventricular ejection fraction is visually estimated to be >70%. Normal regional wall motion  -Resume cortef 15mg daily  -Cardiology consulted, rec appreciated  -Less likely seizure, I discussed the case with neurology Dr. Ansari  -nonpharmacologic interventions: compression stocking, avoid dehydration, staged position changes, discussed with patient  -PT/OT: SNF  -Tele    Abnormal nuclear stress test  Assessment & Plan  V2-V5 T inversion Noted on EKG 5/29, more pronounced than prior. EKG personally reviewed  Reports intermittent chest pain.  Echo normal range of wall motion.  Stress test showed mild ischemia changes- cardiology will follow up in the clinic.   Will start on ASA.     Transaminitis  Assessment & Plan  Hepatitis panel negative  Total bilirubin nomal  Cont monitor LFT    Trending down    Recurrent sinusitis  Assessment & Plan  [facility]  Has been on Meropenem and Eravacycline until 5/31/2021      Completed abx.     Adrenal insufficiency ([place]'s disease) (HCC)- (present on admission)  Assessment & Plan  Cortisol level am 2.7, low  Reports recently her endo md Dr. Jeff inc Cortef to 15mg daily for 10 days, then 10mg daily for 10 days and then 5 mg daily.   Cont Cortef 15mg daily now    Acute pulmonary embolism (HCC)- (present on admission)  Assessment & Plan  Hx of PE on eliquis since April 2021.   Cont eliquis  Patient is advised to follow up with hematology as outpatient to discuss the duration of eliquis    Thyroid disease- (present on admission)  Assessment & Plan  TSH normal  Continue levothyroxine.    Orthostatic hypotension  Assessment & Plan  - hold lasix  - see above plans    Obesity (BMI 35.0-39.9 without comorbidity)  Assessment & Plan  Life style modifications including healthy diet and regular exercise recommended     Chronic systolic  heart failure (HCC)- (present on admission)  Assessment & Plan  -ProBNP 2165  -Echo 5/10/2021: normal LV EF>70%, normal regional wall motion.  -no sign of acute CHF exacerbation       VTE prophylaxis: eliquis

## 2021-06-02 NOTE — PROGRESS NOTES
[id number] - report from Brittanie ROBERTSON.  Pt resting comfortably in bed.  A&O x 4.  Up to amb to br with sba, sl unsteady on feet.  Fall precautions in effect.  Pt calls approp for asst oob.  C/o mild HA and R neck pain, declines med intervention, warm pack provided for comfort. Denies other pain, sob, dizziness, nausea. See flowsheet for assess.  poc reviewed with pt, pt vu, all questions answered.  Tele = sr,  Vss.    Good po intake with am meal, tal diet well.

## 2021-06-02 NOTE — CARE PLAN
The patient is Stable - Low risk of patient condition declining or worsening    Problem: Mobility  Goal: Patient's capacity to carry out activities will improve  Outcome: Progressing  Note: Assessed patient's ambulation to bathroom. Pt assessed to be stable, but does have some dizziness upon sitting/standing. Fall precautions in place.      Problem: Infection - Standard  Goal: Patient will remain free from infection  Outcome: Progressing  Flowsheets (Taken 6/2/2021 0119)  Standard Infection Interventions:   Assessed for signs and symptoms of infection   Implemented standard precautions   Provided education on proper hand hygiene and infection prevention measures   Assessed for removal IV, central lines, intra-arterial or urinary catheters   Instructed patient/family on signs and symptoms of infection  Note: Standard precautions in place. Central line assessed.      Shift Goals  Clinical Goals: Manage hypotension, prevent falls  Patient Goals: Sleep  Family Goals: build up strength    Progress made toward(s) clinical / shift goals: Frequent VS monitoring and fall precautions in place. Pt educated on dangling before getting up and the use of call light to alert staff to needs. Quiet environment provided, pt observed sleeping on several occasions.     Patient is not progressing towards the following goals: n/a

## 2021-06-02 NOTE — CARE PLAN
"  Problem: Psychosocial  Goal: Patient's ability to verbalize feelings about condition will improve  Outcome: Progressing  Flowsheets (Taken 5/28/2021 1945 by Nadya Chavira RCHIKI)  Condition Will Improve:   Discussed coping with medical condition and its effects   Encouraged patient participation in care  Note: Pt able to verbalize feelings and thoughts about POC and disease process. Pt vocalized feelings and any concerns with no issues.      Problem: Communication  Goal: The ability to communicate needs accurately and effectively will improve  Outcome: Met  Note: Pt very clear in verbalizing her needs and desires.    The patient is Stable - Low risk of patient condition declining or worsening    Shift Goals  Clinical Goals: Manage BP, rest  Patient Goals: Rest, manage BP  Family Goals: build up strength    Progress made toward(s) clinical / shift goals:  Pt stated she is \"feeling better\" after resting and has been up to bathroom and walking steady.     Patient is not progressing towards the following goals: n/a      "

## 2021-06-02 NOTE — DISCHARGE PLANNING
Anticipated Discharge Disposition: SNF     Action: SNF referrals placed. Pt declined by [facility] due to open workman's comp. LSW met with pt at bedside and pt informed LSW that she does not have an open workman's comp case open.     Pt was provided numbers for Medicare and requested pt call to provide update so Medicare can update their system.     LSW provided update to Tiffany KENDALL.     Barriers to Discharge: SNF acceptance     Plan: LSW to continue to follow follow

## 2021-06-02 NOTE — PROGRESS NOTES
Telemetry Shift Summary    Rhythm SB-ST  HR Range   Ectopy Rare PVCs  Measurements 0.20/0.08/0.34        Normal Values  Rhythm SR  HR Range    Measurements 0.12-0.20 / 0.06-0.10  / 0.30-0.52

## 2021-06-02 NOTE — PROGRESS NOTES
Bedside report received from AILYN Martell. Assumed pt care. Pt is AOx4. Denies any pain or other distress at this time. Discussed POC including fall prevention, monitoring for hypotension. Pt verbalized understanding. Hourly rounding in place. Fall precautions in place and call light within reach.

## 2021-06-02 NOTE — PROGRESS NOTES
Tele summary :  Rhythm:  sr  Rate:  70's  HI:  .20 borderline 1st  QRS:  .10  QT:  .40    Ectopy:  none    Telemetry strips signed and placed in chart.

## 2021-06-02 NOTE — THERAPY
Physical Therapy   Daily Treatment     Patient Name: Donna Isaac  Age:  55 y.o., Sex:  female  Medical Record #: [id number]  Today's Date: 6/2/2021     Precautions: (P) Fall Risk    Assessment  Pt unable to complete stair training today. She performed ascent 2 steps on therapy stairs with bilat rails with min A. During descent of top step, pt began gradually lowering her body to the floor stating her legs are too weak. She sat to top step with controlled descent and with min A by PT. She denied injury. She was able to return to standing and complete ambulation back to room with FWW, denying any injury. PT discussed with RN. Gait today was supv with FWW with fair balance, and bed mobility and transfers with supervision. Pt is not yet safe for D/C home due to inability to perform stairs. She will benefit from further PT in the post acute setting prior to D/C home.    Plan    Continue current treatment plan.    DC Equipment Recommendations: (P) Unable to determine at this time  Discharge Recommendations: (P) Recommend post-acute placement for additional physical therapy services prior to discharge home      Subjective    Pt reporting feeling good with gait today. Denies any injury related to unanticipated sitting to floor on stairs.     Objective       06/02/21 1354   Balance   Sitting Balance (Static) Good   Sitting Balance (Dynamic) Good   Standing Balance (Static) Fair +   Standing Balance (Dynamic) Fair   Weight Shift Sitting Fair   Weight Shift Standing Fair   Skilled Intervention Compensatory Strategies;Verbal Cuing   Comments with FWW   Gait Analysis   Gait Level Of Assist Supervised   Assistive Device Front Wheel Walker   Distance (Feet) 200   # of Times Distance was Traveled 2   Deviation Other (Comment)  (slow pace, forward flexed)   # of Stairs Climbed 2   Level of Assist with Stairs Minimal Assist  (assisted descent to sitting on stairs)   Weight Bearing Status no restriction   Skilled Intervention  "Compensatory Strategies;Sequencing;Verbal Cuing   Comments with stair training, pt with gradual, assisted descent to sitting on steps stating \"legs are too weak\" during descent of stairs; prior gait in hallway with FWW was supv, and performance of ascent of stairs was min A with bilat rails   Bed Mobility    Supine to Sit Supervised   Sit to Supine Supervised   Scooting Supervised   Comments performing well   Functional Mobility   Sit to Stand Supervised   Bed, Chair, Wheelchair Transfer Supervised   Transfer Method Stand Step   Mobility supine->sit EOB->stand->amb->stairs->amb->sit EOB->supine   Skilled Intervention Compensatory Strategies;Verbal Cuing   Comments cues for hand placement   How much difficulty does the patient currently have...   Turning over in bed (including adjusting bedclothes, sheets and blankets)? 4   Sitting down on and standing up from a chair with arms (e.g., wheelchair, bedside commode, etc.) 4   Moving from lying on back to sitting on the side of the bed? 4   How much help from another person does the patient currently need...   Moving to and from a bed to a chair (including a wheelchair)? 4   Need to walk in a hospital room? 4   Climbing 3-5 steps with a railing? 2   6 clicks Mobility Score 22   Activity Tolerance   Sitting in Chair declined   Sitting Edge of Bed 3 minutes approx   Standing 2x6 minutes approx   Comments limited by LE weakness   Patient / Family Goals    Patient / Family Goal #1 SNF   Short Term Goals    Short Term Goal # 1 Pt to be I with transfers in 4 V so can go home   Goal Outcome # 1 Progressing as expected   Short Term Goal # 2 Pt to be S with amb x 200 feet in 4 V so can go home   Goal Outcome # 2 Goal not met   Short Term Goal # 3 Pt to be Min A x 2 stairs in 4 Vso can get into house   Goal Outcome # 3 Goal not met   Anticipated Discharge Equipment and Recommendations   DC Equipment Recommendations Unable to determine at this time   Discharge Recommendations " Recommend post-acute placement for additional physical therapy services prior to discharge home

## 2021-06-02 NOTE — PROGRESS NOTES
Tele strip printed at 0200 shows SR HR: 57    Measurements: 0.20/0.10/0.42    Telemetry Shift Summary     Rhythm: SB/SR with borderline 1*AVB  HR Range: 50's-90's  Ectopy: Rare PVC     Telemetry monitoring strips placed in pt's chart.

## 2021-06-03 ENCOUNTER — PATIENT OUTREACH (OUTPATIENT)
Dept: HEALTH INFORMATION MANAGEMENT | Facility: OTHER | Age: 56
End: 2021-06-03

## 2021-06-03 VITALS
OXYGEN SATURATION: 96 % | TEMPERATURE: 99.1 F | RESPIRATION RATE: 20 BRPM | BODY MASS INDEX: 42.27 KG/M2 | DIASTOLIC BLOOD PRESSURE: 97 MMHG | SYSTOLIC BLOOD PRESSURE: 143 MMHG | HEART RATE: 97 BPM | WEIGHT: 247.58 LBS | HEIGHT: 64 IN

## 2021-06-03 LAB
ALBUMIN SERPL BCP-MCNC: 2.5 G/DL (ref 3.2–4.9)
ALBUMIN/GLOB SERPL: 1.1 G/DL
ALP SERPL-CCNC: 200 U/L (ref 30–99)
ALT SERPL-CCNC: 72 U/L (ref 2–50)
ANION GAP SERPL CALC-SCNC: 11 MMOL/L (ref 7–16)
AST SERPL-CCNC: 47 U/L (ref 12–45)
BILIRUB SERPL-MCNC: 0.5 MG/DL (ref 0.1–1.5)
BUN SERPL-MCNC: 8 MG/DL (ref 8–22)
CALCIUM SERPL-MCNC: 8.4 MG/DL (ref 8.4–10.2)
CHLORIDE SERPL-SCNC: 107 MMOL/L (ref 96–112)
CHOLEST SERPL-MCNC: 137 MG/DL (ref 100–199)
CO2 SERPL-SCNC: 24 MMOL/L (ref 20–33)
CREAT SERPL-MCNC: 0.63 MG/DL (ref 0.5–1.4)
GLOBULIN SER CALC-MCNC: 2.3 G/DL (ref 1.9–3.5)
GLUCOSE SERPL-MCNC: 89 MG/DL (ref 65–99)
HDLC SERPL-MCNC: 63 MG/DL
LDLC SERPL CALC-MCNC: 58 MG/DL
POTASSIUM SERPL-SCNC: 3.5 MMOL/L (ref 3.6–5.5)
PROT SERPL-MCNC: 4.8 G/DL (ref 6–8.2)
SODIUM SERPL-SCNC: 142 MMOL/L (ref 135–145)
TRIGL SERPL-MCNC: 82 MG/DL (ref 0–149)

## 2021-06-03 PROCEDURE — A9270 NON-COVERED ITEM OR SERVICE: HCPCS | Performed by: STUDENT IN AN ORGANIZED HEALTH CARE EDUCATION/TRAINING PROGRAM

## 2021-06-03 PROCEDURE — 80053 COMPREHEN METABOLIC PANEL: CPT

## 2021-06-03 PROCEDURE — 700102 HCHG RX REV CODE 250 W/ 637 OVERRIDE(OP): Performed by: STUDENT IN AN ORGANIZED HEALTH CARE EDUCATION/TRAINING PROGRAM

## 2021-06-03 PROCEDURE — 700102 HCHG RX REV CODE 250 W/ 637 OVERRIDE(OP): Performed by: HOSPITALIST

## 2021-06-03 PROCEDURE — 80061 LIPID PANEL: CPT

## 2021-06-03 PROCEDURE — A9270 NON-COVERED ITEM OR SERVICE: HCPCS | Performed by: HOSPITALIST

## 2021-06-03 PROCEDURE — 99238 HOSP IP/OBS DSCHRG MGMT 30/<: CPT | Performed by: STUDENT IN AN ORGANIZED HEALTH CARE EDUCATION/TRAINING PROGRAM

## 2021-06-03 RX ORDER — ASPIRIN 81 MG/1
81 TABLET, CHEWABLE ORAL DAILY
Qty: 100 TABLET | Refills: 3 | Status: ON HOLD
Start: 2021-06-04 | End: 2021-06-26

## 2021-06-03 RX ORDER — POTASSIUM CHLORIDE 20 MEQ/1
40 TABLET, EXTENDED RELEASE ORAL ONCE
Status: COMPLETED | OUTPATIENT
Start: 2021-06-03 | End: 2021-06-03

## 2021-06-03 RX ADMIN — Medication 1 LOZENGE: at 08:08

## 2021-06-03 RX ADMIN — CARVEDILOL 6.25 MG: 6.25 TABLET, FILM COATED ORAL at 08:08

## 2021-06-03 RX ADMIN — COLESEVELAM HYDROCHLORIDE 625 MG: 625 TABLET, FILM COATED ORAL at 08:08

## 2021-06-03 RX ADMIN — COLESEVELAM HYDROCHLORIDE 625 MG: 625 TABLET, FILM COATED ORAL at 11:38

## 2021-06-03 RX ADMIN — HYDROCORTISONE 15 MG: 10 TABLET ORAL at 11:38

## 2021-06-03 RX ADMIN — ESTRADIOL 0.5 MG: 1 TABLET ORAL at 05:03

## 2021-06-03 RX ADMIN — ASPIRIN 81 MG CHEWABLE TABLET 81 MG: 81 TABLET CHEWABLE at 05:03

## 2021-06-03 RX ADMIN — APIXABAN 5 MG: 5 TABLET, FILM COATED ORAL at 05:03

## 2021-06-03 RX ADMIN — GABAPENTIN 400 MG: 400 CAPSULE ORAL at 08:08

## 2021-06-03 RX ADMIN — ACETAMINOPHEN 650 MG: 325 TABLET ORAL at 08:08

## 2021-06-03 RX ADMIN — POTASSIUM CHLORIDE 40 MEQ: 1500 TABLET, EXTENDED RELEASE ORAL at 08:08

## 2021-06-03 ASSESSMENT — ENCOUNTER SYMPTOMS
DIZZINESS: 1
WEAKNESS: 1
LOSS OF CONSCIOUSNESS: 1

## 2021-06-03 ASSESSMENT — PAIN DESCRIPTION - PAIN TYPE
TYPE: ACUTE PAIN;CHRONIC PAIN

## 2021-06-03 ASSESSMENT — FIBROSIS 4 INDEX: FIB4 SCORE: 2.74

## 2021-06-03 NOTE — PROGRESS NOTES
Hospital Medicine Daily Progress Note    Date of Service  6/3/2021    Chief Complaint  56 yo F with past medical history of Adrenal insufficiency, PE on eliquis since April 2021, Hypothyroidism, recurrent sinusitis, traumatic brain injury with seizure [id number], who was admitted here recently twice in the month of May for syncope work-up, who presents to the emergency department on 5/25/2021 after once again having to this syncopal episodes at home.     Interval Problem Update  Patient seen and examined at bedside this morning.  No acute events overnight.     Was unable to get SNF placement due to insurance issues.    Plan for discharge home today with home health.    All Data, Medication data reviewed.  Case discussed with nursing, CM,  nurse, pharmacy in IDT rounds.     Consultants/Specialty  cardiology    Code Status  Full Code    Disposition  home    Review of Systems  Review of Systems   Neurological: Positive for dizziness, loss of consciousness and weakness.   All other systems reviewed and are negative.       Physical Exam  Temp:  [36.5 °C (97.7 °F)-37.3 °C (99.1 °F)] 37.3 °C (99.1 °F)  Pulse:  [] 97  Resp:  [16-20] 20  BP: (132-186)/() 143/97  SpO2:  [95 %-97 %] 96 %    Physical Exam  Vitals and nursing note reviewed.   Constitutional:       Appearance: Normal appearance. She is obese. She is ill-appearing.   HENT:      Head: Normocephalic and atraumatic.      Nose: Nose normal.      Mouth/Throat:      Mouth: Mucous membranes are dry.      Pharynx: Oropharynx is clear.   Eyes:      Extraocular Movements: Extraocular movements intact.      Conjunctiva/sclera: Conjunctivae normal.      Pupils: Pupils are equal, round, and reactive to light.   Cardiovascular:      Rate and Rhythm: Normal rate and regular rhythm.      Pulses: Normal pulses.      Heart sounds: Normal heart sounds.   Pulmonary:      Effort: Pulmonary effort is normal. No respiratory distress.      Breath sounds: Normal breath  sounds. No wheezing.   Abdominal:      General: Abdomen is flat. Bowel sounds are normal.      Palpations: Abdomen is soft.   Musculoskeletal:         General: No swelling. Normal range of motion.      Cervical back: Normal range of motion and neck supple.      Comments: Compression stocking noted on BLE   Skin:     General: Skin is warm and dry.   Neurological:      General: No focal deficit present.      Mental Status: She is alert and oriented to person, place, and time. Mental status is at baseline.   Psychiatric:         Mood and Affect: Mood normal.         Behavior: Behavior normal.         Fluids    Intake/Output Summary (Last 24 hours) at 6/3/2021 1217  Last data filed at 6/3/2021 0330  Gross per 24 hour   Intake 600 ml   Output --   Net 600 ml       Laboratory      Recent Labs     06/03/21  0234   SODIUM 142   POTASSIUM 3.5*   CHLORIDE 107   CO2 24   GLUCOSE 89   BUN 8   CREATININE 0.63   CALCIUM 8.4             Recent Labs     06/03/21  0234   TRIGLYCERIDE 82   HDL 63   LDL 58       Imaging  NM-CARDIAC STRESS TEST   Final Result      US-EXTREMITY VENOUS UPPER BILAT   Final Result      DX-ANKLE 2- VIEWS RIGHT   Final Result      Interval medial and lateral malleolus fracture fixation with no new displacement. The fractures have not fully healed      Complete chronic subtalar fusion. Calcaneal cuboid and talonavicular fixation plates in place, mature fusion not yet present      DX-CHEST-PORTABLE (1 VIEW)   Final Result      Stable chest      CT-CSPINE WITHOUT PLUS RECONS   Final Result      No CT evidence of acute cervical spine abnormality.      Mild dextro scoliotic curvature at the cervicothoracic junction and there is facet arthropathy      CT-HEAD W/O   Final Result      No acute intracranial abnormality is identified.      Prior sinus operative procedure with some residual left maxillary mucosal thickening      Mild white matter hypodensity is present.  This is a nonspecific finding which usually is  found to represent chronic microvascular disease in patient's of this demographic.  Demyelination, age indeterminant ischemia and gliosis are also common    possibilities.           Assessment/Plan  * Syncope  Assessment & Plan  -2 recent admissions for syncope,.likely sec to orthostatic hypotension in the setting of adrenal insuffiencey.   -Orthostatic vital sign positive  -Normal Head CT.  -Echo 5/10: Left ventricular ejection fraction is visually estimated to be >70%. Normal regional wall motion  -Resume cortef 15mg daily  -Cardiology consulted, rec appreciated  -Less likely seizure, I discussed the case with neurology Dr. Ansari  -nonpharmacologic interventions: compression stocking, avoid dehydration, staged position changes, discussed with patient  -PT/OT: SNF  -Tele    Abnormal nuclear stress test  Assessment & Plan  V2-V5 T inversion Noted on EKG 5/29, more pronounced than prior. EKG personally reviewed  Reports intermittent chest pain.  Echo normal range of wall motion.  Stress test showed mild ischemia changes- cardiology will follow up in the clinic.   Will start on ASA.     Transaminitis  Assessment & Plan  Hepatitis panel negative  Total bilirubin nomal  Cont monitor LFT    Trending down    Recurrent sinusitis  Assessment & Plan  Follow[facility]  Has been on Meropenem and Eravacycline until 5/31/2021      Completed abx.     Adrenal insufficiency (Valentín's disease) (HCC)- (present on admission)  Assessment & Plan  Cortisol level am 2.7, low  Reports recently her endo md Dr. Jeff inc Cortef to 15mg daily for 10 days, then 10mg daily for 10 days and then 5 mg daily.   Cont Cortef 15mg daily now    Acute pulmonary embolism (HCC)- (present on admission)  Assessment & Plan  Hx of PE on eliquis since April 2021.   Cont eliquis  Patient is advised to follow up with hematology as outpatient to discuss the duration of eliquis    Thyroid disease- (present on admission)  Assessment & Plan  TSH  normal  Continue levothyroxine.    Orthostatic hypotension  Assessment & Plan  - hold lasix  - see above plans    Obesity (BMI 35.0-39.9 without comorbidity)  Assessment & Plan  Life style modifications including healthy diet and regular exercise recommended     Chronic systolic heart failure (HCC)- (present on admission)  Assessment & Plan  -ProBNP 2165  -Echo 5/10/2021: normal LV EF>70%, normal regional wall motion.  -no sign of acute CHF exacerbation       VTE prophylaxis: eliquis

## 2021-06-03 NOTE — PROGRESS NOTES
Received bedside report from AILYN White. Pt resting comfortably, no current needs, universal fall and safety precautions in place, bed in lowest position and call light within reach. Pt reports no pain right now, will reassess with am med pass.

## 2021-06-03 NOTE — CARE PLAN
"  Problem: Respiratory  Goal: Patient will achieve/maintain optimum respiratory ventilation and gas exchange  Outcome: Progressing  Flowsheets  Taken 6/3/2021 1048  O2 Delivery Device: Silicone Nasal Cannula  Taken 6/3/2021 0742  Incentive Spirometer:   Effective   Strong Effort  Incentive Spirometer Volume: 1500 mL  Deep Breathe and Cough: Performs Correctly  Note: Reminded pt on importance of IS use. Pt demonstrated accurate use and has been using IS to 1500+ mL 5-6 times per hour (\"during commercials\"). Pt is to be discharged today, and pt said that she will take IS with her and continue regular use.      Problem: Self Care  Goal: Patient will have the ability to perform ADLs independently or with assistance (bathe, groom, dress, toilet and feed)  Outcome: Met  Note: Pt able to perform ADLs with minimal assistance. Pt up to BR this am and also brushed teeth and hair and is getting dressed for d/c at noon.    The patient is Stable - Low risk of patient condition declining or worsening    Shift Goals  Clinical Goals: rest, manage cough, manage BP, decrease cough  Patient Goals: rest, manage cough and BP, regain strength  Family Goals: build up strength, rest    Progress made toward(s) clinical / shift goals: regular IS use, cough is \"not as bad\", and pt states she \"feels pretty good\" today.     Patient is not progressing towards the following goals: N/A      "

## 2021-06-03 NOTE — PROGRESS NOTES
Pt is A&Ox4. Discharge instructions reviewed with pt. telebox removed. Belongings accounted for. Rx sent to pharm were returned to pt in same styrofoam box received.

## 2021-06-03 NOTE — DISCHARGE SUMMARY
Discharge Summary    CHIEF COMPLAINT ON ADMISSION  Chief Complaint   Patient presents with   • Fall   • Syncope       Reason for Admission  Ems     Admission Date  5/25/2021    CODE STATUS  Full Code    HPI & HOSPITAL COURSE  56 yo F with past medical history of Adrenal insufficiency, PE on eliquis since April 2021, Hypothyroidism, recurrent sinusitis, traumatic brain injury with seizure [id number], who was admitted here recently twice in the month of May for syncope work-up, who presents to the emergency department on 5/25/2021 after once again having syncopal episodes at home.  Extensive work-up again this time was mostly unremarkable.  No embolic interventions including compression stockings, avoiding dehydration end-stage positional changes were discussed with patient. She was also advised to follow-up with her PCP for evaluation and possible reconciliation of her home medications.  Also of note, she had intermittent episode of chest pain while in the hospital, EKG with some nonspecific T wave abnormalities, she had a nuclear stress test done which did show some mild changes in the inferior aspect of the heart. This was evaluated by cardiology, and she was advised to follow-up outpatient in the clinic.  She was seen by PT/OT and was to be discharged to SNF, but was unable to due to insurance issues. She was ultimately discharged home with [facility].    Therefore, she is discharged in fair and stable condition to home with [facility] and close outpatient follow-up.    The patient met 2-midnight criteria for an inpatient stay at the time of discharge.    Discharge Date  6/3/21    FOLLOW UP ITEMS POST DISCHARGE  none    DISCHARGE DIAGNOSES  Principal Problem:    Syncope POA: Unknown      Overview: Overview:       Likely due to hypotension  Active Problems:    Chronic systolic heart failure (HCC) POA: Yes    Obesity (BMI 35.0-39.9 without comorbidity) POA: Unknown    Orthostatic hypotension  POA: Unknown    Thyroid disease POA: Yes    Acute pulmonary embolism (HCC) POA: Yes    Adrenal insufficiency (Valentín's disease) (HCC) POA: Yes    Recurrent sinusitis POA: Unknown    Transaminitis POA: Unknown    Abnormal nuclear stress test POA: Unknown  Resolved Problems:    * No resolved hospital problems. *      FOLLOW UP  Future Appointments   Date Time Provider Department Center   7/6/2021  9:40 AM JASPER Del Toro [facility] None   9/13/2021  8:15 AM [facility] EXAM 12 ECHO [facility]   9/20/2021  8:00 AM Eligio Torres M.D. [facility] None     Madisyn Mccullough M.D.  [address] [id number] [phone]    Call  Please call to schedule a hospital follow up appointment with your Primary Care Provider after discharge, as needed. Thank you.      MEDICATIONS ON DISCHARGE     Medication List      START taking these medications      Instructions   aspirin 81 MG Chew chewable tablet  Start taking on: June 4, 2021  Commonly known as: ASA   Chew 1 tablet every day.  Dose: 81 mg        CHANGE how you take these medications      Instructions   amitriptyline 10 MG Tabs  What changed: See the new instructions.  Commonly known as: ELAVIL   TAKE 2 TABLETS BY MOUTH EVERY NIGHT AT BEDTIME, AND 1 TABLET BY MOUTH EVERY MORNING     carvedilol 25 MG Tabs  What changed: Another medication with the same name was removed. Continue taking this medication, and follow the directions you see here.  Commonly known as: COREG   Take 25 mg by mouth 2 times a day with meals.  Dose: 25 mg     hydrocortisone 5 MG Tabs  What changed: Another medication with the same name was removed. Continue taking this medication, and follow the directions you see here.  Commonly known as: CORTEF   Take 1 tablet by mouth 1/2 hour after lunch.  Dose: 5 mg        CONTINUE taking these medications      Instructions   apixaban 5mg Tabs  Commonly known as: ELIQUIS   Take 1 tablet by mouth 2 times a day for 30 days. After completion of loading  dose of 10 mg po bid x 7 days, take 5 mg po bid  Dose: 5 mg     calcitonin (salmon) 200 UNIT/ACT Soln  Commonly known as: MIACALCIN   Spray 1 Spray in nose every bedtime.  Dose: 1 Spray     cetirizine 10 MG Tabs  Commonly known as: ZYRTEC   Take 20 mg by mouth 2 (two) times a day. 20mg = 2 tabs  Dose: 20 mg     colesevelam 625 MG Tabs  Commonly known as: WELCHOL   Take 625 mg by mouth 3 times a day, with meals.  Dose: 625 mg     cromolyn 100 MG/5ML solution  Commonly known as: GASTROCROM   Take 200 mg by mouth 3 times a day before meals.  Dose: 200 mg     cyanocobalamin 1000 MCG/ML Soln  Commonly known as: VITAMIN B-12   Inject 1,000 mcg into the shoulder, thigh, or buttocks every Saturday.  Dose: 1,000 mcg     Dexilant 60 MG Cpdr delayed-release capsule  Generic drug: Dexlansoprazole   Take 60 mg by mouth every evening.  Dose: 60 mg     DULoxetine 60 MG Cpep delayed-release capsule  Commonly known as: CYMBALTA   Take 60 mg by mouth every bedtime.  Dose: 60 mg     Erenumab 70 MG/ML Soaj  Commonly known as: AIMOVIG   Inject 140 mg under the skin Q30 DAYS.  Dose: 140 mg     estradiol 0.5 MG tablet  Commonly known as: ESTRACE   Take 0.5 mg by mouth every morning.  Dose: 0.5 mg     Frova 2.5 MG Tabs  Generic drug: Frovatriptan Succinate   Take 2.5 mg by mouth as needed (For migraines). MR x 1 in 1 hour if no relief  then must wait 8 hours for another dose  Dose: 2.5 mg     furosemide 40 MG Tabs  Commonly known as: LASIX   Take 1 tablet by mouth every day.  Dose: 40 mg     gabapentin 400 MG Caps  Commonly known as: NEURONTIN   Take 1 Cap by mouth 3 times a day.  Dose: 400 mg     Jardiance 10 MG Tabs  Generic drug: Empagliflozin   Take 10 mg by mouth every bedtime.  Dose: 10 mg     levalbuterol 45 MCG/ACT inhaler  Commonly known as: XOPENEX HFA   Inhale 1-2 Puffs every four hours as needed for Shortness of Breath.  Dose: 1-2 Puff     levothyroxine 75 MCG Tabs  Commonly known as: SYNTHROID   Take 75 mcg by mouth every  evening.  Dose: 75 mcg     liothyronine 25 MCG Tabs  Commonly known as: CYTOMEL   Take 25 mcg by mouth every bedtime.  Dose: 25 mcg     Magnesium 400 MG Tabs   Take 400 mg by mouth every evening.  Dose: 400 mg     meloxicam 15 MG tablet  Commonly known as: MOBIC   Take 15 mg by mouth at bedtime. FOR PAIN  Dose: 15 mg     montelukast 10 MG Tabs  Commonly known as: SINGULAIR   Take 10 mg by mouth every evening.  Dose: 10 mg     multivitamin Tabs   Take 1 Tab by mouth every day.  Dose: 1 tablet     tizanidine 4 MG Tabs  Commonly known as: ZANAFLEX   Take 4 mg by mouth 1 time a day as needed for Muscle Spasms.  Dose: 4 mg     Vitamin D3 2000 UNIT Caps   Take 2,000 Units by mouth every day.  Dose: 2,000 Units     vitamin k 100 MCG tablet   Take 100 mcg by mouth every day.  Dose: 100 mcg     Voltaren 1 % Gel  Generic drug: diclofenac sodium   Apply 1 Application topically 1 time a day as needed (inflammation). Applied to ankle and lower back  Dose: 1 Application     Zomacton 10 MG Solr  Generic drug: Somatropin   Inject 0.3 mg under the skin every bedtime.  Dose: 0.3 mg        STOP taking these medications    meropenem 1 GM Solr  Commonly known as: MERREM     Xerava 100 MG Solr  Generic drug: Eravacycline Dihydrochloride            Allergies  Allergies   Allergen Reactions   • Ancef [Cefazolin] Hives and Shortness of Breath   • Bactrim [Sulfamethoxazole W-Trimethoprim] Shortness of Breath   • Bee Venom Anaphylaxis   • Buprenorphine Anaphylaxis   • Buprenorphine Hives and Shortness of Breath   • Clindamycin Hives and Shortness of Breath   • Contrast Media With Iodine [Iodine] Hives and Swelling   • Doxycycline Hives and Shortness of Breath   • Econazole Anaphylaxis   • Econazole Nitrate [Ecoza] Anaphylaxis   • Flagyl  [Metronidazole] Hives and Unspecified   • Floxin Otic Anaphylaxis   • Floxin [Ofloxacin] Anaphylaxis, Shortness of Breath and Swelling     Cause throat swelling and difficulty breathing   • Gadolinium  "Derivatives Hives and Swelling     Throat swelling   • Hydrocodone-Acetaminophen Hives and Shortness of Breath   • Iodine Shortness of Breath and Anaphylaxis     Throat and tongue swelling with IV contrast   • Keflex Shortness of Breath   • Keflex Hives and Shortness of Breath   • Levaquin Shortness of Breath     anaphlyaxis   • Levaquin Anaphylaxis   • Levofloxacin Shortness of Breath   • Morphine Anaphylaxis   • Naloxone Hives     \"hives, SOB\"   • Naloxone Hives and Shortness of Breath   • Nitrofurantoin Shortness of Breath     ...and hives     • Nitrofurantoin Hives and Shortness of Breath   • Norco [Apap-Fd&C Yellow #10 Al Tai-Hydrocodone] Shortness of Breath     ...and hives     • Nyquil Hives and Shortness of Breath   • Oxycodone Shortness of Breath     ...and hives     • Oxycodone Hcl Hives and Shortness of Breath   • Paricalcitol Hives   • Paricalcitol Hives, Shortness of Breath and Unspecified     CHEST PAIN   • Penicillins Shortness of Breath     ...and hives     • Penicillins Hives and Shortness of Breath   • Tape Contact Dermatitis and Swelling     Blisters, clear tegaderm ok.. No steristrips.  Other reaction(s): Other, Unknown   • Tramadol Hives   • Vicks Dayquil Cold Hives and Shortness of Breath   • Ancef [Cefazolin] Hives   • Azithromycin Hives and Shortness of Breath     Pt had Hives also   • Bextra [Valdecoxib] Rash     \"Skin burn and peeling.\"   • Flagyl [Kdc:Metronidazole+Tartrazine] Hives   • Gadolinium Swelling and Hives   • Linezolid Rash     Rash all over body   • Nyquil Hives and Shortness of Breath     Other reaction(s): Respiratory Distress   • Other Drug Hives     \"Enconazole nitrate.\" shortness of breath and hives   • Other Misc Unspecified     Adhesive tape: blisters, skin peels off   • Clindamycin      HIVES     • Clindamycin Hcl      Other reaction(s): hives   • Codeine Shortness of Breath and Rash     Rash & SOB   • Iodinated Diagnostic Agents  [Diagnostic X-Ray Materials]    • " Sulfa Drugs      Other reaction(s): Hives   • Tramadol      Rash/hives   • Tygacil [Tigecycline]      itching   • Bextra [Valdecoxib] Unspecified     Skin blisters and peels off   • Tape Unspecified     Blisters and peels off       DIET  Orders Placed This Encounter   Procedures   • Diet Order Diet: Cardiac     Standing Status:   Standing     Number of Occurrences:   1     Order Specific Question:   Diet:     Answer:   Cardiac [6]       ACTIVITY  As tolerated.  Weight bearing as tolerated    CONSULTATIONS  none    PROCEDURES  none    LABORATORY  Lab Results   Component Value Date    SODIUM 142 06/03/2021    POTASSIUM 3.5 (L) 06/03/2021    CHLORIDE 107 06/03/2021    CO2 24 06/03/2021    GLUCOSE 89 06/03/2021    BUN 8 06/03/2021    CREATININE 0.63 06/03/2021        Lab Results   Component Value Date    WBC 6.8 05/29/2021    HEMOGLOBIN 12.2 05/29/2021    HEMATOCRIT 38.4 05/29/2021    PLATELETCT 113 (L) 05/29/2021        Total time of the discharge process exceeds 20 minutes.

## 2021-06-03 NOTE — CARE PLAN
The patient is Stable - Low risk of patient condition declining or worsening    Shift Goals  Clinical Goals: Manage BP and headache  Patient Goals: Rest  Family Goals: build up strength    Progress made toward(s) clinical / shift goals:  VS stable overnight, headache controlled with ordered pain medication. Pt observed sleeping well on several occasions.    Patient is not progressing towards the following goals:n/a

## 2021-06-03 NOTE — DISCHARGE INSTRUCTIONS
Discharge Instructions    Discharged to home by car with friend. Discharged via wheelchair, hospital escort: Yes.  Special equipment needed: Not Applicable    Be sure to schedule a follow-up appointment with your primary care doctor or any specialists as instructed.     Discharge Plan:   Diet Plan: Discussed  Activity Level: Discussed  Confirmed Follow up Appointment: Patient to Call and Schedule Appointment  Confirmed Symptoms Management: Discussed  Medication Reconciliation Updated: Yes    I understand that a diet low in cholesterol, fat, and sodium is recommended for good health. Unless I have been given specific instructions below for another diet, I accept this instruction as my diet prescription.   Other diet: cardiac    Special Instructions: None    · Is patient discharged on Warfarin / Coumadin?   No       Syncope    Syncope refers to a condition in which a person temporarily loses consciousness. Syncope may also be called fainting or passing out. It is caused by a sudden decrease in blood flow to the brain. Even though most causes of syncope are not dangerous, syncope can be a sign of a serious medical problem. Your health care provider may do tests to find the reason why you are having syncope.  Signs that you may be about to faint include:  · Feeling dizzy or light-headed.  · Feeling nauseous.  · Seeing all white or all black in your field of vision.  · Having cold, clammy skin.  If you faint, get medical help right away. Call your local emergency services (911 in the [place] Do not drive yourself to the hospital.  Follow these instructions at home:  Pay attention to any changes in your symptoms. Take these actions to stay safe and to help relieve your symptoms:  Lifestyle  · Do not drive, use machinery, or play sports until your health care provider says it is okay.  · Do not drink alcohol.  · Do not use any products that contain nicotine or tobacco, such as cigarettes and e-cigarettes. If you need help  quitting, ask your health care provider.  · Drink enough fluid to keep your urine pale yellow.  General instructions  · Take over-the-counter and prescription medicines only as told by your health care provider.  · If you are taking blood pressure or heart medicine, get up slowly and take several minutes to sit and then stand. This can reduce dizziness or light-headedness.  · Have someone stay with you until you feel stable.  · If you start to feel like you might faint, lie down right away and raise (elevate) your feet above the level of your heart. Breathe deeply and steadily. Wait until all the symptoms have passed.  · Keep all follow-up visits as told by your health care provider. This is important.  Get help right away if you:  · Have a severe headache.  · Faint once or repeatedly.  · Have pain in your chest, abdomen, or back.  · Have a very fast or irregular heartbeat (palpitations).  · Have pain when you breathe.  · Are bleeding from your mouth or rectum, or you have black or tarry stool.  · Have a seizure.  · Are confused.  · Have trouble walking.  · Have severe weakness.  · Have vision problems.  These symptoms may represent a serious problem that is an emergency. Do not wait to see if your symptoms will go away. Get medical help right away. Call your local emergency services (911 in the [place] Do not drive yourself to the hospital.  Summary  · Syncope refers to a condition in which a person temporarily loses consciousness. It is caused by a sudden decrease in blood flow to the brain.  · Signs that you may be about to faint include dizziness, feeling light-headed, feeling nauseous, sudden vision changes, or cold, clammy skin.  · Although most causes of syncope are not dangerous, syncope can be a sign of a serious medical problem. If you faint, get medical help right away.  This information is not intended to replace advice given to you by your health care provider. Make sure you discuss any questions you  have with your health care provider.  Document Released: 12/18/2006 Document Revised: 11/30/2018 Document Reviewed: 11/26/2018  [employer] Patient Education © 2020 [employer]      Ankle Fracture    The ankle joint is made up of the lower (distal) sections of your lower leg bones(tibia and fibula) along with a bone in your foot (talus). An ankle fracture is a break in one, two, or all three of these sections of bone.  Follow these instructions at home:  If you have a splint:  · Wear the splint as told by your doctor. Take it off only as told by your doctor.  · Loosen the splint if your toes tingle, become numb, or turn cold and blue.  · Keep the splint clean.  · If the splint is not waterproof:  ? Do not let it get wet.  ? Cover it with a watertight covering when you take a bath or a shower.  If you have a cast:  · Do not stick anything inside the cast to scratch your skin. Doing that increases your risk of infection.  · Check the skin around the cast every day. Tell your doctor about any concerns.  · You may put lotion on dry skin around the edges of the cast. Do not put lotion on the skin underneath the cast.  · Keep the cast clean.  · If the cast is not waterproof:  ? Do not let it get wet.  ? Cover it with a watertight covering when you take a bath or a shower.  Managing pain, stiffness, and swelling  · If directed, put ice on the injured area:  ? If you have a removable splint, remove it as told by your doctor.  ? Put ice in a plastic bag.  ? Place a towel between your skin and the bag.  ? Leave the ice on for 20 minutes, 2-3 times a day.  · Move your toes often. This prevents stiffness and lessens swelling.  · Raise (elevate) the injured area above the level of your heart while you are sitting or lying down.  General instructions  · Do not use the injured limb to support your body weight until your doctor says that you can. Use crutches as told by your doctor.  · Take over-the-counter and prescription  medicines only as told by your doctor.  · Ask your doctor when it is safe to drive if you have a cast or splint.  · Do exercises as told by your doctor.  · Do not use any products that contain nicotine or tobacco, such as cigarettes and e-cigarettes. These can delay bone healing. If you need help quitting, ask your doctor.  · Keep all follow-up visits as told by your doctor. This is important.  Contact a doctor if:  · Your pain or swelling gets worse.  · Your pain or swelling does not get better when you rest or take medicine.  Get help right away if:  · Your cast gets damaged.  · You continue to have very bad pain.  · You have new pain or swelling.  · Your skin or toes below the injured ankle:  ? Turn blue or gray.  ? Feel cold or numb.  ? Lose sensitivity to touch.  Summary  · An ankle fracture is a break in one, two, or all three of the bones in your lower leg and lower foot.  · If you have a splint, wear it as told by your health care provider. Keep it clean and dry.  · If you have a cast, do not stick anything inside the cast to scratch your skin. This can cause infection.  · Use ice, take medicines, raise your foot, and avoid tobacco and nicotine products. These steps will lessen pain and swelling and speed up healing.  This information is not intended to replace advice given to you by your health care provider. Make sure you discuss any questions you have with your health care provider.  Document Released: 10/15/2010 Document Revised: 11/30/2018 Document Reviewed: 01/22/2018  [employer] Patient Education © 2020 [employer]    MRSA Infection, Self-Care, Adult  Methicillin-resistant Staphylococcus aureus (MRSA) infection is caused by bacteria that no longer respond to antibiotic medicines. MRSA infection can be hard to treat.  Self-care is important after being diagnosed with MRSA. Following instructions for self-care will:  · Help you heal well.  · Prevent the infection from spreading to others.  Your health  care provider may also give you more specific instructions. If you have problems or questions, contact your health care provider.  What are the risks?  MRSA can be on the skin or in the nose of many people without causing problems. This is called MRSA colonization. However, MRSA can cause problems for you and others if it enters the body through a cut, a sore, or an invasive medical procedure or device.  · If MRSA enters your body, it may cause serious problems, such as:  ? Skin infections.  ? Bone or joint infections.  ? Pneumonia.  ? Bloodstream infections (sepsis).  · If you have these infections, MRSA may spread to others, including:  ? Visitors or other patients in the hospital.  ? Friends, family, or other people at home or in your community.  Supplies needed:  · Soap and water.  · Germ-free (sterile) dressing.  · Alcohol-based hand  (optional).  · Home cleaning solutions that contain bleach.  · Clean or disposable towels.  How to prevent MRSA infections  Take these steps to avoid getting another MRSA infection and to prevent the bacteria from spreading to others.  Hand washing    · Wash your hands frequently with soap and water. If soap and water are not available, use an alcohol-based hand . Dry your hands with a clean or disposable towel.  · Make sure that everyone in the household washes their hands often.  Wound care   If you have a wound, follow instructions from your health care provider about how to take care of your wound. Make sure you:  · Wash your hands with soap and water before and after you change your bandage (dressing). If soap and water are not available, use an alcohol-based hand .  · Change your dressing as told by your health care provider.  · Leave any stitches (sutures), skin glue, or adhesive strips in place. These skin closures may need to be in place for 2 weeks or longer. If adhesive strip edges start to loosen and curl up, you may trim the loose edges. Do  not remove adhesive strips completely unless your health care provider tells you to do that.  · Clean wounds, cuts, and abrasions with soap and water and cover them with dry, sterile dressings until they heal.  · Check your wound every day for signs of infection. Check for:  ? More redness, swelling, or pain.  ? More fluid or blood.  ? Warmth.  ? Pus or a bad smell.  · If you have a wound that seems to be infected, ask your health care provider if a culture should be done for MRSA and other bacteria.  Personal hygiene  · Maintain good hygiene by bathing often and keeping your body clean.  · Wash hands before preparing food.  · Always shower after exercising.  · Wash towels, bedding, and clothes in the washing machine with detergent and hot water. Dry them in a hot dryer.  General tips  · Take your antibiotic medicine as told by your health care provider. Take them only when absolutely necessary. Do not stop taking the antibiotic even if you start to feel better.  · Avoid close contact with others as much as possible.  · Do not use towels, razors, toothbrushes, bedding, or other items that will be used by others.  · Clean surfaces regularly to remove germs (disinfection). Use products or solutions that contain bleach. Make sure you disinfect bathroom surfaces, food preparation areas, and doorknobs.  Follow these instructions at home:  · Take over-the-counter and prescription medicines only as told by your health care provider.  · Tell all health care providers who care for you that you have MRSA or that you have had MRSA.  · If you are breastfeeding, talk to your health care provider about MRSA. You may be asked to temporarily stop breastfeeding.  · If you have an invasive medical device, make sure that you know how to take care of it to prevent infection.  · Keep all follow-up visits as told by your health care provider. This is important.  Contact a health care provider if:  · Your infection seems to be getting  worse. Signs may include:  ? Warmth, redness, or tenderness around your wound site.  ? A red line that spreads from your infection site.  ? A dark color in the area around your infection.  ? Wound drainage that is tan, yellow, or green.  ? A bad smell coming from your wound.  · You have symptoms of a new MRSA infection:  ? A pus-filled pimple.  ? A boil on your skin.  ? Pus draining from your skin.  ? A sore (abscess) under your skin or somewhere in your body.  ? Fever with or without chills.  Get help right away if:  · You have trouble breathing.  · You feel nauseous, you vomit, or you cannot take medicine without vomiting.  · You have chest pain.  These symptoms may represent a serious problem that is an emergency. Do not wait to see if the symptoms will go away. Get medical help right away. Call your local emergency services (911 in the [place] Do not drive yourself to the hospital.  Summary  · Self-care is important after being diagnosed with MRSA. This will help you heal well and prevent the infection from spreading to others.  · Wash your hands often, avoid close contact with others, and avoid sharing towels, razors, toothbrushes, and bedding with other people. This will help prevent the infection from spreading to others.  · If you are being treated for a MRSA infection, make sure to take over-the-counter and prescription medicines as told. Finish all antibiotic medicine even if you start to feel better.  · If you have a wound, follow instructions from your health care provider about how to take care of it. Check your wound every day for signs of infection.  This information is not intended to replace advice given to you by your health care provider. Make sure you discuss any questions you have with your health care provider.  Document Released: 03/23/2007 Document Revised: 03/05/2020 Document Reviewed: 03/06/2020  [employer] Patient Education © 2020 [employer]      Depression / Suicide Risk    As you are  discharged [facility], it is important to learn how to keep safe from harming yourself.    Recognize the warning signs:  · Abrupt changes in personality, positive or negative- including increase in energy   · Giving away possessions  · Change in eating patterns- significant weight changes-  positive or negative  · Change in sleeping patterns- unable to sleep or sleeping all the time   · Unwillingness or inability to communicate  · Depression  · Unusual sadness, discouragement and loneliness  · Talk of wanting to die  · Neglect of personal appearance   · Rebelliousness- reckless behavior  · Withdrawal from people/activities they love  · Confusion- inability to concentrate     If you or a loved one observes any of these behaviors or has concerns about self-harm, here's what you can do:  · Talk about it- your feelings and reasons for harming yourself  · Remove any means that you might use to hurt yourself (examples: pills, rope, extension cords, firearm)  · Get professional help from the community (Mental Health, Substance Abuse, psychological counseling)  · Do not be alone:Call your Safe Contact- someone whom you trust who will be there for you.  · Call your local CRISIS HOTLINE 445-6882 or 636-637-5386  · Call your local Children's Mobile Crisis Response Team [place] (679) 629-5646 or www.[employer]  · Call the toll free National Suicide Prevention Hotlines   · National Suicide Prevention Lifeline 276-995-WLFX (3055)  · National Hope Line Network 800-SUICIDE (207-5801)

## 2021-06-03 NOTE — PROGRESS NOTES
Bedside report received from AILYN Hidalgo. Assumed pt care.  Colin is at bedside. Pt is AOx4. Reporting 3/10 pain in neck and head, declines interventions at this time. Denies any other distress. Discussed POC including pain control, BP monitoring. Pt verbalized understanding. Hourly rounding in place. Fall precautions in place and call light within reach.

## 2021-06-03 NOTE — PROGRESS NOTES
Tele strip printed at 0301 shows SR HR: 77    Measurements: 0.20/0.08/0.36    Telemetry Shift Summary     Rhythm: SR/ST  HR Range: 70's-100's  Ectopy: Rare PVC/Couplets/Triplets     Telemetry monitoring strips placed in pt's chart.

## 2021-06-04 NOTE — PROGRESS NOTES
Telemetry Shift Summary    Rhythm SR-ST  HR Range   Ectopy Occas PVC  Measurements 0.18/0.08/0.30        Normal Values  Rhythm SR  HR Range    Measurements 0.12-0.20 / 0.06-0.10  / 0.30-0.52

## 2021-06-07 ENCOUNTER — HOSPITAL ENCOUNTER (OUTPATIENT)
Facility: MEDICAL CENTER | Age: 56
End: 2021-06-07
Attending: OTOLARYNGOLOGY
Payer: MEDICARE

## 2021-06-07 PROCEDURE — 87070 CULTURE OTHR SPECIMN AEROBIC: CPT

## 2021-06-07 PROCEDURE — 87075 CULTR BACTERIA EXCEPT BLOOD: CPT

## 2021-06-07 PROCEDURE — 87186 SC STD MICRODIL/AGAR DIL: CPT

## 2021-06-07 PROCEDURE — 87205 SMEAR GRAM STAIN: CPT

## 2021-06-07 PROCEDURE — 87077 CULTURE AEROBIC IDENTIFY: CPT

## 2021-06-08 LAB
GRAM STN SPEC: NORMAL
SIGNIFICANT IND 70042: NORMAL
SITE SITE: NORMAL
SOURCE SOURCE: NORMAL

## 2021-06-11 DIAGNOSIS — G43.709 CHRONIC MIGRAINE W/O AURA W/O STATUS MIGRAINOSUS, NOT INTRACTABLE: ICD-10-CM

## 2021-06-15 ENCOUNTER — APPOINTMENT (OUTPATIENT)
Dept: RADIOLOGY | Facility: MEDICAL CENTER | Age: 56
DRG: 643 | End: 2021-06-15
Attending: EMERGENCY MEDICINE
Payer: MEDICARE

## 2021-06-15 ENCOUNTER — OFFICE VISIT (OUTPATIENT)
Dept: VASCULAR LAB | Facility: MEDICAL CENTER | Age: 56
End: 2021-06-15
Attending: INTERNAL MEDICINE
Payer: MEDICARE

## 2021-06-15 ENCOUNTER — HOSPITAL ENCOUNTER (INPATIENT)
Facility: MEDICAL CENTER | Age: 56
LOS: 3 days | DRG: 643 | End: 2021-06-18
Attending: EMERGENCY MEDICINE | Admitting: STUDENT IN AN ORGANIZED HEALTH CARE EDUCATION/TRAINING PROGRAM
Payer: MEDICARE

## 2021-06-15 DIAGNOSIS — I51.81 TAKOTSUBO SYNDROME: ICD-10-CM

## 2021-06-15 DIAGNOSIS — I26.93 SINGLE SUBSEGMENTAL PULMONARY EMBOLISM WITHOUT ACUTE COR PULMONALE (HCC): ICD-10-CM

## 2021-06-15 DIAGNOSIS — R55 SYNCOPE, UNSPECIFIED SYNCOPE TYPE: ICD-10-CM

## 2021-06-15 DIAGNOSIS — R10.13 EPIGASTRIC ABDOMINAL PAIN: ICD-10-CM

## 2021-06-15 DIAGNOSIS — I95.9 HYPOTENSION, UNSPECIFIED HYPOTENSION TYPE: ICD-10-CM

## 2021-06-15 DIAGNOSIS — J32.9 RECURRENT SINUSITIS: ICD-10-CM

## 2021-06-15 DIAGNOSIS — I10 ESSENTIAL HYPERTENSION: ICD-10-CM

## 2021-06-15 PROBLEM — A41.02 SEPSIS DUE TO METHICILLIN RESISTANT STAPHYLOCOCCUS AUREUS (MRSA) (HCC): Status: ACTIVE | Noted: 2021-06-15

## 2021-06-15 PROBLEM — R79.89 ELEVATED TROPONIN: Status: ACTIVE | Noted: 2021-05-26

## 2021-06-15 LAB
ABO GROUP BLD: NORMAL
ALBUMIN SERPL BCP-MCNC: 2.6 G/DL (ref 3.2–4.9)
ALBUMIN/GLOB SERPL: 1.1 G/DL
ALP SERPL-CCNC: 126 U/L (ref 30–99)
ALT SERPL-CCNC: 46 U/L (ref 2–50)
ANION GAP SERPL CALC-SCNC: 10 MMOL/L (ref 7–16)
APPEARANCE UR: CLEAR
AST SERPL-CCNC: 63 U/L (ref 12–45)
BASOPHILS # BLD AUTO: 0.6 % (ref 0–1.8)
BASOPHILS # BLD: 0.04 K/UL (ref 0–0.12)
BILIRUB SERPL-MCNC: 0.6 MG/DL (ref 0.1–1.5)
BILIRUB UR QL STRIP.AUTO: NEGATIVE
BLD GP AB SCN SERPL QL: NORMAL
BUN SERPL-MCNC: 31 MG/DL (ref 8–22)
CALCIUM SERPL-MCNC: 8.4 MG/DL (ref 8.5–10.5)
CHLORIDE SERPL-SCNC: 110 MMOL/L (ref 96–112)
CO2 SERPL-SCNC: 19 MMOL/L (ref 20–33)
COLOR UR: YELLOW
CREAT SERPL-MCNC: 1.18 MG/DL (ref 0.5–1.4)
CRP SERPL HS-MCNC: 0.38 MG/DL (ref 0–0.75)
EKG IMPRESSION: NORMAL
EOSINOPHIL # BLD AUTO: 0.21 K/UL (ref 0–0.51)
EOSINOPHIL NFR BLD: 3.1 % (ref 0–6.9)
ERYTHROCYTE [DISTWIDTH] IN BLOOD BY AUTOMATED COUNT: 63.4 FL (ref 35.9–50)
GLOBULIN SER CALC-MCNC: 2.4 G/DL (ref 1.9–3.5)
GLUCOSE SERPL-MCNC: 119 MG/DL (ref 65–99)
GLUCOSE UR STRIP.AUTO-MCNC: 500 MG/DL
HCT VFR BLD AUTO: 41 % (ref 37–47)
HGB BLD-MCNC: 12.3 G/DL (ref 12–16)
IMM GRANULOCYTES # BLD AUTO: 0.01 K/UL (ref 0–0.11)
IMM GRANULOCYTES NFR BLD AUTO: 0.1 % (ref 0–0.9)
INR PPP: 1.6 (ref 0.87–1.13)
KETONES UR STRIP.AUTO-MCNC: NEGATIVE MG/DL
LACTATE BLD-SCNC: 1.6 MMOL/L (ref 0.5–2)
LACTATE BLD-SCNC: 1.9 MMOL/L (ref 0.5–2)
LEUKOCYTE ESTERASE UR QL STRIP.AUTO: NEGATIVE
LIPASE SERPL-CCNC: 90 U/L (ref 11–82)
LYMPHOCYTES # BLD AUTO: 0.92 K/UL (ref 1–4.8)
LYMPHOCYTES NFR BLD: 13.5 % (ref 22–41)
MCH RBC QN AUTO: 30.1 PG (ref 27–33)
MCHC RBC AUTO-ENTMCNC: 30 G/DL (ref 33.6–35)
MCV RBC AUTO: 100.5 FL (ref 81.4–97.8)
MICRO URNS: ABNORMAL
MONOCYTES # BLD AUTO: 0.82 K/UL (ref 0–0.85)
MONOCYTES NFR BLD AUTO: 12 % (ref 0–13.4)
NEUTROPHILS # BLD AUTO: 4.84 K/UL (ref 2–7.15)
NEUTROPHILS NFR BLD: 70.7 % (ref 44–72)
NITRITE UR QL STRIP.AUTO: NEGATIVE
NRBC # BLD AUTO: 0 K/UL
NRBC BLD-RTO: 0 /100 WBC
PH UR STRIP.AUTO: 5 [PH] (ref 5–8)
PLATELET # BLD AUTO: 390 K/UL (ref 164–446)
PMV BLD AUTO: 10.4 FL (ref 9–12.9)
POTASSIUM SERPL-SCNC: 4.6 MMOL/L (ref 3.6–5.5)
PROCALCITONIN SERPL-MCNC: 2.53 NG/ML
PROT SERPL-MCNC: 5 G/DL (ref 6–8.2)
PROT UR QL STRIP: NEGATIVE MG/DL
PROTHROMBIN TIME: 18.6 SEC (ref 12–14.6)
RBC # BLD AUTO: 4.08 M/UL (ref 4.2–5.4)
RBC UR QL AUTO: NEGATIVE
RH BLD: NORMAL
SODIUM SERPL-SCNC: 139 MMOL/L (ref 135–145)
SP GR UR STRIP.AUTO: 1.01
T4 FREE SERPL-MCNC: 1.16 NG/DL (ref 0.93–1.7)
TROPONIN T SERPL-MCNC: 25 NG/L (ref 6–19)
TROPONIN T SERPL-MCNC: 30 NG/L (ref 6–19)
TSH SERPL DL<=0.005 MIU/L-ACNC: 0.03 UIU/ML (ref 0.38–5.33)
UROBILINOGEN UR STRIP.AUTO-MCNC: 0.2 MG/DL
WBC # BLD AUTO: 6.8 K/UL (ref 4.8–10.8)

## 2021-06-15 PROCEDURE — 86901 BLOOD TYPING SEROLOGIC RH(D): CPT

## 2021-06-15 PROCEDURE — 84443 ASSAY THYROID STIM HORMONE: CPT

## 2021-06-15 PROCEDURE — 81003 URINALYSIS AUTO W/O SCOPE: CPT

## 2021-06-15 PROCEDURE — 99223 1ST HOSP IP/OBS HIGH 75: CPT | Mod: AI | Performed by: STUDENT IN AN ORGANIZED HEALTH CARE EDUCATION/TRAINING PROGRAM

## 2021-06-15 PROCEDURE — 83605 ASSAY OF LACTIC ACID: CPT

## 2021-06-15 PROCEDURE — 770020 HCHG ROOM/CARE - TELE (206)

## 2021-06-15 PROCEDURE — 700105 HCHG RX REV CODE 258: Performed by: EMERGENCY MEDICINE

## 2021-06-15 PROCEDURE — 96365 THER/PROPH/DIAG IV INF INIT: CPT

## 2021-06-15 PROCEDURE — 80053 COMPREHEN METABOLIC PANEL: CPT

## 2021-06-15 PROCEDURE — 84484 ASSAY OF TROPONIN QUANT: CPT

## 2021-06-15 PROCEDURE — 93005 ELECTROCARDIOGRAM TRACING: CPT

## 2021-06-15 PROCEDURE — 87040 BLOOD CULTURE FOR BACTERIA: CPT

## 2021-06-15 PROCEDURE — A9270 NON-COVERED ITEM OR SERVICE: HCPCS | Performed by: STUDENT IN AN ORGANIZED HEALTH CARE EDUCATION/TRAINING PROGRAM

## 2021-06-15 PROCEDURE — 86140 C-REACTIVE PROTEIN: CPT

## 2021-06-15 PROCEDURE — U0005 INFEC AGEN DETEC AMPLI PROBE: HCPCS

## 2021-06-15 PROCEDURE — 86900 BLOOD TYPING SEROLOGIC ABO: CPT

## 2021-06-15 PROCEDURE — 74176 CT ABD & PELVIS W/O CONTRAST: CPT | Mod: MG

## 2021-06-15 PROCEDURE — 85610 PROTHROMBIN TIME: CPT

## 2021-06-15 PROCEDURE — 700111 HCHG RX REV CODE 636 W/ 250 OVERRIDE (IP): Performed by: STUDENT IN AN ORGANIZED HEALTH CARE EDUCATION/TRAINING PROGRAM

## 2021-06-15 PROCEDURE — 700102 HCHG RX REV CODE 250 W/ 637 OVERRIDE(OP): Performed by: STUDENT IN AN ORGANIZED HEALTH CARE EDUCATION/TRAINING PROGRAM

## 2021-06-15 PROCEDURE — 99214 OFFICE O/P EST MOD 30 MIN: CPT | Mod: 95,CR | Performed by: INTERNAL MEDICINE

## 2021-06-15 PROCEDURE — 83690 ASSAY OF LIPASE: CPT

## 2021-06-15 PROCEDURE — 70450 CT HEAD/BRAIN W/O DYE: CPT | Mod: ME

## 2021-06-15 PROCEDURE — U0003 INFECTIOUS AGENT DETECTION BY NUCLEIC ACID (DNA OR RNA); SEVERE ACUTE RESPIRATORY SYNDROME CORONAVIRUS 2 (SARS-COV-2) (CORONAVIRUS DISEASE [COVID-19]), AMPLIFIED PROBE TECHNIQUE, MAKING USE OF HIGH THROUGHPUT TECHNOLOGIES AS DESCRIBED BY CMS-2020-01-R: HCPCS

## 2021-06-15 PROCEDURE — 84145 PROCALCITONIN (PCT): CPT

## 2021-06-15 PROCEDURE — 93005 ELECTROCARDIOGRAM TRACING: CPT | Performed by: EMERGENCY MEDICINE

## 2021-06-15 PROCEDURE — 71045 X-RAY EXAM CHEST 1 VIEW: CPT

## 2021-06-15 PROCEDURE — 99285 EMERGENCY DEPT VISIT HI MDM: CPT

## 2021-06-15 PROCEDURE — 84439 ASSAY OF FREE THYROXINE: CPT

## 2021-06-15 PROCEDURE — 85025 COMPLETE CBC W/AUTO DIFF WBC: CPT

## 2021-06-15 PROCEDURE — 86850 RBC ANTIBODY SCREEN: CPT

## 2021-06-15 PROCEDURE — 700105 HCHG RX REV CODE 258: Performed by: STUDENT IN AN ORGANIZED HEALTH CARE EDUCATION/TRAINING PROGRAM

## 2021-06-15 RX ORDER — HYDROCORTISONE 5 MG/1
5 TABLET ORAL
Status: DISCONTINUED | OUTPATIENT
Start: 2021-06-15 | End: 2021-06-15

## 2021-06-15 RX ORDER — ASPIRIN 81 MG/1
81 TABLET, CHEWABLE ORAL DAILY
Status: DISCONTINUED | OUTPATIENT
Start: 2021-06-16 | End: 2021-06-18 | Stop reason: HOSPADM

## 2021-06-15 RX ORDER — HYDROCORTISONE 10 MG/1
10 TABLET ORAL
Status: DISCONTINUED | OUTPATIENT
Start: 2021-06-16 | End: 2021-06-16

## 2021-06-15 RX ORDER — CROMOLYN SODIUM 100 MG/5ML
200 SOLUTION, CONCENTRATE ORAL
Status: DISCONTINUED | OUTPATIENT
Start: 2021-06-16 | End: 2021-06-18 | Stop reason: HOSPADM

## 2021-06-15 RX ORDER — SODIUM CHLORIDE 9 MG/ML
1000 INJECTION, SOLUTION INTRAVENOUS ONCE
Status: COMPLETED | OUTPATIENT
Start: 2021-06-15 | End: 2021-06-15

## 2021-06-15 RX ORDER — FUROSEMIDE 20 MG/1
20 TABLET ORAL DAILY
Status: ON HOLD | COMMUNITY
Start: 2021-06-12 | End: 2021-06-26 | Stop reason: SDUPTHER

## 2021-06-15 RX ORDER — CETIRIZINE HYDROCHLORIDE 10 MG/1
20 TABLET ORAL
Status: DISCONTINUED | OUTPATIENT
Start: 2021-06-15 | End: 2021-06-18 | Stop reason: HOSPADM

## 2021-06-15 RX ORDER — CALCITONIN SALMON 200 [IU]/.09ML
1 SPRAY, METERED NASAL
Status: DISCONTINUED | OUTPATIENT
Start: 2021-06-15 | End: 2021-06-18 | Stop reason: HOSPADM

## 2021-06-15 RX ORDER — FAMOTIDINE 40 MG/1
40 TABLET, FILM COATED ORAL
Status: ON HOLD | COMMUNITY
Start: 2021-05-27 | End: 2021-07-09

## 2021-06-15 RX ORDER — AMILORIDE HYDROCHLORIDE 5 MG/1
5 TABLET ORAL DAILY
Status: ON HOLD | COMMUNITY
End: 2021-06-26

## 2021-06-15 RX ORDER — DULOXETIN HYDROCHLORIDE 60 MG/1
60 CAPSULE, DELAYED RELEASE ORAL
Status: DISCONTINUED | OUTPATIENT
Start: 2021-06-15 | End: 2021-06-18 | Stop reason: HOSPADM

## 2021-06-15 RX ORDER — LIOTHYRONINE SODIUM 25 UG/1
25 TABLET ORAL
Status: DISCONTINUED | OUTPATIENT
Start: 2021-06-15 | End: 2021-06-18 | Stop reason: HOSPADM

## 2021-06-15 RX ORDER — CYANOCOBALAMIN 1000 UG/ML
1000 INJECTION, SOLUTION INTRAMUSCULAR; SUBCUTANEOUS
Status: DISCONTINUED | OUTPATIENT
Start: 2021-06-19 | End: 2021-06-18 | Stop reason: HOSPADM

## 2021-06-15 RX ORDER — SODIUM CHLORIDE 9 MG/ML
INJECTION, SOLUTION INTRAVENOUS CONTINUOUS
Status: DISCONTINUED | OUTPATIENT
Start: 2021-06-15 | End: 2021-06-18 | Stop reason: HOSPADM

## 2021-06-15 RX ORDER — LEVOTHYROXINE SODIUM 0.07 MG/1
75 TABLET ORAL EVERY EVENING
Status: DISCONTINUED | OUTPATIENT
Start: 2021-06-15 | End: 2021-06-18 | Stop reason: HOSPADM

## 2021-06-15 RX ORDER — MONTELUKAST SODIUM 10 MG/1
10 TABLET ORAL EVERY EVENING
Status: DISCONTINUED | OUTPATIENT
Start: 2021-06-15 | End: 2021-06-18 | Stop reason: HOSPADM

## 2021-06-15 RX ORDER — APIXABAN 5 MG/1
5 TABLET, FILM COATED ORAL 2 TIMES DAILY
Status: ON HOLD | COMMUNITY
Start: 2021-06-14 | End: 2021-07-30

## 2021-06-15 RX ORDER — VITAMIN B COMPLEX
2000 TABLET ORAL DAILY
Status: DISCONTINUED | OUTPATIENT
Start: 2021-06-16 | End: 2021-06-18 | Stop reason: HOSPADM

## 2021-06-15 RX ADMIN — VANCOMYCIN HYDROCHLORIDE 2750 MG: 500 INJECTION, POWDER, LYOPHILIZED, FOR SOLUTION INTRAVENOUS at 18:58

## 2021-06-15 RX ADMIN — SODIUM CHLORIDE 1000 ML: 9 INJECTION, SOLUTION INTRAVENOUS at 14:33

## 2021-06-15 RX ADMIN — MONTELUKAST 10 MG: 10 TABLET, FILM COATED ORAL at 21:50

## 2021-06-15 RX ADMIN — SODIUM CHLORIDE 1000 ML: 9 INJECTION, SOLUTION INTRAVENOUS at 16:00

## 2021-06-15 RX ADMIN — SODIUM CHLORIDE: 9 INJECTION, SOLUTION INTRAVENOUS at 21:50

## 2021-06-15 RX ADMIN — FAMOTIDINE 20 MG: 10 INJECTION INTRAVENOUS at 21:51

## 2021-06-15 RX ADMIN — LIOTHYRONINE SODIUM 25 MCG: 25 TABLET ORAL at 22:03

## 2021-06-15 RX ADMIN — APIXABAN 5 MG: 5 TABLET, FILM COATED ORAL at 21:50

## 2021-06-15 RX ADMIN — CETIRIZINE HYDROCHLORIDE 20 MG: 10 TABLET, FILM COATED ORAL at 21:49

## 2021-06-15 RX ADMIN — CALCITONIN SALMON 200 UNITS: 200 SPRAY, METERED NASAL at 22:02

## 2021-06-15 RX ADMIN — DULOXETINE HYDROCHLORIDE 60 MG: 60 CAPSULE, DELAYED RELEASE ORAL at 21:50

## 2021-06-15 RX ADMIN — LEVOTHYROXINE SODIUM 75 MCG: 0.07 TABLET ORAL at 21:50

## 2021-06-15 ASSESSMENT — COGNITIVE AND FUNCTIONAL STATUS - GENERAL
SUGGESTED CMS G CODE MODIFIER MOBILITY: CK
HELP NEEDED FOR BATHING: A LITTLE
WALKING IN HOSPITAL ROOM: A LITTLE
TURNING FROM BACK TO SIDE WHILE IN FLAT BAD: A LITTLE
DRESSING REGULAR LOWER BODY CLOTHING: A LITTLE
DAILY ACTIVITIY SCORE: 20
DRESSING REGULAR UPPER BODY CLOTHING: A LITTLE
STANDING UP FROM CHAIR USING ARMS: A LITTLE
MOVING FROM LYING ON BACK TO SITTING ON SIDE OF FLAT BED: A LITTLE
TOILETING: A LITTLE
MOBILITY SCORE: 18
CLIMB 3 TO 5 STEPS WITH RAILING: A LITTLE
MOVING TO AND FROM BED TO CHAIR: A LITTLE
SUGGESTED CMS G CODE MODIFIER DAILY ACTIVITY: CJ

## 2021-06-15 ASSESSMENT — LIFESTYLE VARIABLES
TOTAL SCORE: 0
EVER FELT BAD OR GUILTY ABOUT YOUR DRINKING: NO
ALCOHOL_USE: NO
TOTAL SCORE: 0
HAVE YOU EVER FELT YOU SHOULD CUT DOWN ON YOUR DRINKING: NO
TOTAL SCORE: 0
AVERAGE NUMBER OF DAYS PER WEEK YOU HAVE A DRINK CONTAINING ALCOHOL: 0
EVER HAD A DRINK FIRST THING IN THE MORNING TO STEADY YOUR NERVES TO GET RID OF A HANGOVER: NO
CONSUMPTION TOTAL: NEGATIVE
HOW MANY TIMES IN THE PAST YEAR HAVE YOU HAD 5 OR MORE DRINKS IN A DAY: 0
DOES PATIENT WANT TO STOP DRINKING: NO
HAVE PEOPLE ANNOYED YOU BY CRITICIZING YOUR DRINKING: NO
ON A TYPICAL DAY WHEN YOU DRINK ALCOHOL HOW MANY DRINKS DO YOU HAVE: 0

## 2021-06-15 ASSESSMENT — ENCOUNTER SYMPTOMS
FOCAL WEAKNESS: 0
NAUSEA: 1
DIARRHEA: 0
WEAKNESS: 1
FEVER: 0
TREMORS: 0
CHILLS: 0
MYALGIAS: 0
BLURRED VISION: 0
HEMOPTYSIS: 0
COUGH: 0
ABDOMINAL PAIN: 1
DIZZINESS: 1
ORTHOPNEA: 0
HEADACHES: 1
EYE PAIN: 0
VOMITING: 0
SINUS PAIN: 1

## 2021-06-15 ASSESSMENT — PAIN DESCRIPTION - PAIN TYPE: TYPE: ACUTE PAIN

## 2021-06-15 ASSESSMENT — FIBROSIS 4 INDEX
FIB4 SCORE: 2.74
FIB4 SCORE: 1.33
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
## 2021-06-15 NOTE — PROGRESS NOTES
Follow Up VASCULAR VISIT  Subjective:   Donna Isaac is a 54 y.o. female who presents today 6-15-21 for blood pressure problem    This visit was conducted via zoom video call using secure and encrypted video conferencing technology due to covid-19 restrictions.   The patient was in a private location in the [facility].    The patient's identity was confirmed and verbal consent was obtained for this virtual visit.    HPI:  Here for f/u of htn in settting of takasubo's cm, thyroid disease, and dm as well as recent pulmonary embolism  Patient has had a very difficult last few months and has been in the hospital multiple times.  At her hospitalization in April she was found to have a small subsegmental pulmonary embolism.  She was started on Eliquis.  Most recently she was back in the hospital with syncope.  She was started on aspirin in addition to the Eliquis.  She has had no notable bleeding.  She says her shortness of breath is at baseline.  She has follow-up with cardiology neurology scheduled.  She also has follow-up with her endocrinologist.  Her blood pressures have been quite variable with lows as low as the 80s systolic and highs as high as the 190s.  She continues to have significant lightheadedness and syncope.  She has this in multiple different positions not just with standing.  She is off all of her antihypertensives except for carvedilol.    Social History     Tobacco Use   Smoking Status Never Smoker   Smokeless Tobacco Never Used     DIET AND EXERCISE:  Weight Change: down a few pounds  Diet: common adult  Exercise: minimal exercise        Objective:     There were no vitals filed for this visit.   There is no height or weight on file to calculate BMI.  Physical Exam  Constitutional:       General: She is not in acute distress.     Appearance: She is well-developed. She is not diaphoretic.   Eyes:      General: No scleral icterus.     Extraocular Movements: Extraocular movements intact.       Conjunctiva/sclera: Conjunctivae normal.   Neck:      Thyroid: No thyromegaly.      Vascular: No JVD.   Pulmonary:      Effort: Pulmonary effort is normal. No respiratory distress.   Neurological:      General: No focal deficit present.      Mental Status: She is alert and oriented to person, place, and time.      Cranial Nerves: No cranial nerve deficit.      Coordination: Coordination normal.      Deep Tendon Reflexes: Reflexes are normal and symmetric.   Psychiatric:         Mood and Affect: Mood normal.         Behavior: Behavior normal.       Lab Results   Component Value Date    CHOLSTRLTOT 137 06/03/2021    LDL 58 06/03/2021    HDL 63 06/03/2021    TRIGLYCERIDE 82 06/03/2021      Lab Results   Component Value Date    PROTHROMBTM 12.7 01/27/2021    INR 0.92 01/27/2021       Lab Results   Component Value Date    HBA1C 6.0 (H) 04/27/2021      Lab Results   Component Value Date    SODIUM 142 06/03/2021    POTASSIUM 3.5 (L) 06/03/2021    CHLORIDE 107 06/03/2021    CO2 24 06/03/2021    GLUCOSE 89 06/03/2021    BUN 8 06/03/2021    CREATININE 0.63 06/03/2021    IFAFRICA >60 06/03/2021    IFNOTAFR >60 06/03/2021      Lab Results   Component Value Date    ALDOSTERONE 67.7 11/20/2020    PRA - 5.5  Plasma mets - normal    Lab Results   Component Value Date    WBC 6.8 05/29/2021    RBC 4.02 (L) 05/29/2021    HEMOGLOBIN 12.2 05/29/2021    HEMATOCRIT 38.4 05/29/2021    MCV 95.5 05/29/2021    MCH 30.3 05/29/2021    MCHC 31.8 (L) 05/29/2021    MPV 13.1 (H) 05/29/2021      Echo march 2020  Normal left ventricular systolic function. Left ventricular ejection   fraction is visually estimated to be 70%.  Normal diastolic function.  Ascending aorta is dilated with a diameter of 4.3  cm.    ABPM March 2020  Ambulatory blood pressure monitor results reviewed  Full report under media tab  Reasonable data acquisition  Mean daytime: 164/101 consistent with poorly controlled mixed systolic and diastolic out of office blood  pressure  Nocturnal dip: Appears to be present based upon the curves but not on the averages  Did have a precipitous fall in blood pressure during early morning hours without increase in heart rate    Medical Decision Making:  Today's Assessment / Status / Plan:     1. Single subsegmental pulmonary embolism without acute cor pulmonale (HCC)  US-EXTREMITY VENOUS LOWER BILAT   2. Essential hypertension     3. Takotsubo syndrome     4. Syncope, unspecified syncope type       Patient Type: Primary Prevention    Etiology of Established CVD if Present: Recurrent stress-induced cardiomyopathy    Lipid Management: Qualifies for Statin Therapy Based on 2013 ACC/AHA Guidelines: no  Calculated 10-Year Risk of ASCVD: 2  Currently on Statin: No  Clean coronaries on catheterization by her report in the past  No clear indication for statin therapy  -Continue lifestyle modification  -Would have defer recommendation of work-up for ischemia is notable for atherosclerosis    Blood Pressure Management:  ACC-AHA blood pressure goal less than 130/80  long history of labile blood pressure  Given her history of stress-induced cardiomyopathy, I think at baseline she has increased sensitivity circulating catecholamines often known as a pseudo-pheochromocytoma syndrome  No evidence of pheo based on plasma mets  Intermittent steroid use has been barrier to control  Aldosterone renin ratio not consistent with primary aldosteronism  No albuminuria  Currently management of her blood pressure is exceedingly difficult given her wide swings, potential underlying neurology, cardiology, and or endocrine issues and syncope.  As I told her, I do not have a lot of recommendations regarding how to treat her blood pressure at present.  Would  continue current regimen for now  Plan:  -Continue home blood pressure monitoring  -continue to hold doxazosin 2 mg each night  -Continue carvedilol  -Continue to hold spironolactone  - continue to hold losartan  -  continue furosemide for now - patient thinks it helps her swelling - but consider change to hctz in future  -Further work-up per below    Glycemic Status: Prediabetes  - continue jardiance per endo    Anti-Platelet/Anti-Coagulant Tx:   Currently on combination of aspirin and Eliquis  -Hopefully can stop Eliquis in short order per below    Smoking: Continue complete avoidance    Physical Activity: Defer recommendations to her orthopedic surgeon    Weight Management and Nutrition:  in the past we have discussed Mediterranean-style diet low in sodium.  She should increase her sodium intake when she has lightheadedness    Other:     1) stress-induced cardiomyopathy, recurrent -most recent ejection fraction is normal.  No signs or symptoms of heart failure on exam currently.  She has had a normal Holter monitor and cardiac cath by report.  Now apparently with a potential picture consistent with ischemia  -Continue current neurohormonal therapy  -Defer any other further work-up and management to cardiology -she has a follow-up appointment scheduled    2) hypothyroidism -defer management to endocrinology    3) sinus cyst and migraine headache - previously improved after surgery.  Unclear whether this has an impact on her lightheadedness and syncope.  Defer management to ENT and neurology    4) recurrent syncope -as explained to patient and her family do not think this is a blood pressure issue.  I suspect there is a rhythm or other cardiogenic cause.  There may also be neurologic or endocrine issues that are contributing.  As I told her further work-up is outside of our scope of expertise and I have encouraged her to follow-up with her cardiologist, neurologist, and endocrinologist    5) pulmonary embolism -she had a small subsegmental pulmonary embolism seen on a CT scan in April.  Subsequent CT scan showed no evidence of PE.  She had an upper extremity duplex that showed no evidence of DVT.  She has increased risk of  bleeding on aspirin and Eliquis.  She has not yet had lower extremity duplex.  By itself, subsegmental PE does not require anticoagulation and in the current setting where she had a repeat normal CT I think that the risk outweighs the benefit.  That being said if she had a lower extremity DVT that would be an indication for anticoagulation.  As such, using shared decision making we decided we will check lower extremity venous duplex.  If negative for DVT we can stop her Eliquis.  We will continue her Eliquis pending those results    6) multiple orthopedic issues -defer management to orthopedics    7) fibromyalgia -defer management to neurology and PCP    8) PTSD -symptoms appear stable.  She has appointment to see a counselor.  Otherwise defer management to PCP    Instructed to follow-up with PCP for remainder of adult medical needs: yes  We will partner with other providers in the management of established vascular disease and cardiometabolic risk factors.    Studies to Be Obtained: Extremity venous duplex  Labs to Be Obtained: Defer to her other specialists    Follow up in: 3 months or as needed    Time: 30-39min - chart review/prep, review of other providers' records, imaging/lab review, face-to-face time for history/examination, ordering, prescribing,  review of results/meds/ treatment plan with patient/family/caregiver, documentation in EMR, care coordination (as needed)    Michael J Bloch, M.D.     Cc:  DOUGIE Butler
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
## 2021-06-15 NOTE — ED TRIAGE NOTES
Chief Complaint   Patient presents with   • Syncope     x 6 times since yesterday. Last night at 2300 and this morning at 0600 the patient states she hit the back of her head during the fall. The patient takes Eliquis for a PE   • T-5000 Head Injury     posterior head   • Chest Pain     history of AAA 3cm that is not currently being treated.     Patient BIBA for above. No fluids given PTA, BSFS 163, and the patient states she has been compliant with her medications.    Vitals:    06/15/21 1331   BP: (!) 88/54   Pulse: 60   Resp: 18   Temp: 36.1 °C (97 °F)   SpO2: 95%

## 2021-06-15 NOTE — ED NOTES
Med rec updated and complete. Allergies reviewed.  Met with pt at bedside. All morning doses taken. Pt reports that her last admission that she finished her course of Merrem.    [phone]

## 2021-06-15 NOTE — PROGRESS NOTES
"      Spiritual Care Note    Patient Information     Patient's Name: Donna Isaac   MRN: [id number]    YOB: 1965   Age and Gender: 55 y.o. female   Service Area: ED C   Room (and Bed): RD 03/03 RED   Ethnicity or Nationality:     Primary Language: English   Taoist/Spiritual preference: Sikh   Place of Residence: [place]   Family/Friends/Others Present: No   Clinical Team Present: No   Medical Diagnosis(-es)/Procedure(s): Syncope   Code Status: Prior    Date of Admission: 6/15/2021   Length of Stay: 0 days        Spiritual Care Provider Information:  Name of Spiritual Care Provider: Sophia Watts  Title of Spiritual Care Provider: Associate   Phone Number: [phone]  E-mail: [email or web address]  Total time : 10 minutes    Encounter/Request Information  Encounter/Request Type   Visited With: Patient  Nature of the Visit: Initial, On shift  General Visit: Yes  Referral From/ Origin of Request: SC rounds, Verbal patient    Religous Needs/Values  Taoist Needs Visit  Taoist Needs: Prayer    Spiritual Assessment     Spiritual Care Encounters    Observations/Symptoms: Accepting, Anxious, Frustration, Thankfulness    Interaction/Conversation: Pt reports that she has been in [facility] for a month and is now here. She is frustrated by her long illness and worried about her , who's \"really stressing out\" about her health and their attempt to buy a house. Pt requested prayer for these concerns and thanked the . Education given about how to request SC in future.    Assessment: Need    Need: Seeking Spiritual Assistance and Support    Interventions: Compassionate presence, active listening, prayer.    Outcomes: Spiritual Comfort    Plan: Visit Upon Request    Notes:            "

## 2021-06-16 LAB
ANION GAP SERPL CALC-SCNC: 10 MMOL/L (ref 7–16)
BASOPHILS # BLD AUTO: 0.9 % (ref 0–1.8)
BASOPHILS # BLD: 0.06 K/UL (ref 0–0.12)
BUN SERPL-MCNC: 20 MG/DL (ref 8–22)
CALCIUM SERPL-MCNC: 8.1 MG/DL (ref 8.5–10.5)
CHLORIDE SERPL-SCNC: 111 MMOL/L (ref 96–112)
CO2 SERPL-SCNC: 18 MMOL/L (ref 20–33)
CREAT SERPL-MCNC: 0.71 MG/DL (ref 0.5–1.4)
CRP SERPL HS-MCNC: 0.64 MG/DL (ref 0–0.75)
EOSINOPHIL # BLD AUTO: 0.23 K/UL (ref 0–0.51)
EOSINOPHIL NFR BLD: 3.6 % (ref 0–6.9)
ERYTHROCYTE [DISTWIDTH] IN BLOOD BY AUTOMATED COUNT: 65.9 FL (ref 35.9–50)
GLUCOSE SERPL-MCNC: 100 MG/DL (ref 65–99)
HCT VFR BLD AUTO: 41 % (ref 37–47)
HGB BLD-MCNC: 12.3 G/DL (ref 12–16)
IMM GRANULOCYTES # BLD AUTO: 0.02 K/UL (ref 0–0.11)
IMM GRANULOCYTES NFR BLD AUTO: 0.3 % (ref 0–0.9)
LACTATE BLD-SCNC: 1.4 MMOL/L (ref 0.5–2)
LACTATE BLD-SCNC: 2 MMOL/L (ref 0.5–2)
LYMPHOCYTES # BLD AUTO: 0.93 K/UL (ref 1–4.8)
LYMPHOCYTES NFR BLD: 14.4 % (ref 22–41)
MCH RBC QN AUTO: 30.8 PG (ref 27–33)
MCHC RBC AUTO-ENTMCNC: 30 G/DL (ref 33.6–35)
MCV RBC AUTO: 102.5 FL (ref 81.4–97.8)
MONOCYTES # BLD AUTO: 0.78 K/UL (ref 0–0.85)
MONOCYTES NFR BLD AUTO: 12.1 % (ref 0–13.4)
NEUTROPHILS # BLD AUTO: 4.44 K/UL (ref 2–7.15)
NEUTROPHILS NFR BLD: 68.7 % (ref 44–72)
NRBC # BLD AUTO: 0 K/UL
NRBC BLD-RTO: 0 /100 WBC
PLATELET # BLD AUTO: 300 K/UL (ref 164–446)
PMV BLD AUTO: 10.4 FL (ref 9–12.9)
POTASSIUM SERPL-SCNC: 4.5 MMOL/L (ref 3.6–5.5)
PROCALCITONIN SERPL-MCNC: 2.22 NG/ML
RBC # BLD AUTO: 4 M/UL (ref 4.2–5.4)
SARS-COV-2 RNA RESP QL NAA+PROBE: NOTDETECTED
SODIUM SERPL-SCNC: 139 MMOL/L (ref 135–145)
SPECIMEN SOURCE: NORMAL
T4 FREE SERPL-MCNC: 1.4 NG/DL (ref 0.93–1.7)
TSH SERPL DL<=0.005 MIU/L-ACNC: 0.02 UIU/ML (ref 0.38–5.33)
WBC # BLD AUTO: 6.5 K/UL (ref 4.8–10.8)

## 2021-06-16 PROCEDURE — 700102 HCHG RX REV CODE 250 W/ 637 OVERRIDE(OP): Performed by: STUDENT IN AN ORGANIZED HEALTH CARE EDUCATION/TRAINING PROGRAM

## 2021-06-16 PROCEDURE — 99233 SBSQ HOSP IP/OBS HIGH 50: CPT | Performed by: STUDENT IN AN ORGANIZED HEALTH CARE EDUCATION/TRAINING PROGRAM

## 2021-06-16 PROCEDURE — 85025 COMPLETE CBC W/AUTO DIFF WBC: CPT

## 2021-06-16 PROCEDURE — A9270 NON-COVERED ITEM OR SERVICE: HCPCS | Performed by: STUDENT IN AN ORGANIZED HEALTH CARE EDUCATION/TRAINING PROGRAM

## 2021-06-16 PROCEDURE — 86140 C-REACTIVE PROTEIN: CPT

## 2021-06-16 PROCEDURE — 700111 HCHG RX REV CODE 636 W/ 250 OVERRIDE (IP): Mod: JG | Performed by: STUDENT IN AN ORGANIZED HEALTH CARE EDUCATION/TRAINING PROGRAM

## 2021-06-16 PROCEDURE — 84439 ASSAY OF FREE THYROXINE: CPT

## 2021-06-16 PROCEDURE — 83605 ASSAY OF LACTIC ACID: CPT

## 2021-06-16 PROCEDURE — 700105 HCHG RX REV CODE 258: Performed by: STUDENT IN AN ORGANIZED HEALTH CARE EDUCATION/TRAINING PROGRAM

## 2021-06-16 PROCEDURE — 770020 HCHG ROOM/CARE - TELE (206)

## 2021-06-16 PROCEDURE — 84145 PROCALCITONIN (PCT): CPT

## 2021-06-16 PROCEDURE — 80048 BASIC METABOLIC PNL TOTAL CA: CPT

## 2021-06-16 PROCEDURE — 700111 HCHG RX REV CODE 636 W/ 250 OVERRIDE (IP): Performed by: STUDENT IN AN ORGANIZED HEALTH CARE EDUCATION/TRAINING PROGRAM

## 2021-06-16 PROCEDURE — 36415 COLL VENOUS BLD VENIPUNCTURE: CPT

## 2021-06-16 PROCEDURE — 84443 ASSAY THYROID STIM HORMONE: CPT

## 2021-06-16 RX ORDER — ACETAMINOPHEN 325 MG/1
650 TABLET ORAL ONCE
Status: COMPLETED | OUTPATIENT
Start: 2021-06-16 | End: 2021-06-16

## 2021-06-16 RX ORDER — NYSTATIN 100000 [USP'U]/G
POWDER TOPICAL 2 TIMES DAILY
Status: DISCONTINUED | OUTPATIENT
Start: 2021-06-16 | End: 2021-06-18 | Stop reason: HOSPADM

## 2021-06-16 RX ORDER — SUMATRIPTAN 50 MG/1
25 TABLET, FILM COATED ORAL EVERY 6 HOURS PRN
Status: DISCONTINUED | OUTPATIENT
Start: 2021-06-16 | End: 2021-06-18 | Stop reason: HOSPADM

## 2021-06-16 RX ORDER — HYDROCORTISONE 10 MG/1
10 TABLET ORAL 2 TIMES DAILY
Status: DISCONTINUED | OUTPATIENT
Start: 2021-06-16 | End: 2021-06-18 | Stop reason: HOSPADM

## 2021-06-16 RX ORDER — ONDANSETRON 4 MG/1
4 TABLET, ORALLY DISINTEGRATING ORAL EVERY 4 HOURS PRN
Status: DISCONTINUED | OUTPATIENT
Start: 2021-06-16 | End: 2021-06-18 | Stop reason: HOSPADM

## 2021-06-16 RX ORDER — ACETAMINOPHEN 325 MG/1
650 TABLET ORAL EVERY 4 HOURS PRN
Status: DISCONTINUED | OUTPATIENT
Start: 2021-06-16 | End: 2021-06-18 | Stop reason: HOSPADM

## 2021-06-16 RX ORDER — FAMOTIDINE 20 MG/1
20 TABLET, FILM COATED ORAL 2 TIMES DAILY
Status: DISCONTINUED | OUTPATIENT
Start: 2021-06-16 | End: 2021-06-18 | Stop reason: HOSPADM

## 2021-06-16 RX ADMIN — ONDANSETRON 4 MG: 4 TABLET, ORALLY DISINTEGRATING ORAL at 17:14

## 2021-06-16 RX ADMIN — CETIRIZINE HYDROCHLORIDE 20 MG: 10 TABLET, FILM COATED ORAL at 05:40

## 2021-06-16 RX ADMIN — FAMOTIDINE 20 MG: 10 INJECTION INTRAVENOUS at 05:39

## 2021-06-16 RX ADMIN — NYSTATIN: 100000 POWDER TOPICAL at 17:14

## 2021-06-16 RX ADMIN — APIXABAN 5 MG: 5 TABLET, FILM COATED ORAL at 05:39

## 2021-06-16 RX ADMIN — HYDROCORTISONE 10 MG: 10 TABLET ORAL at 17:13

## 2021-06-16 RX ADMIN — Medication 2000 UNITS: at 05:39

## 2021-06-16 RX ADMIN — ONDANSETRON 4 MG: 4 TABLET, ORALLY DISINTEGRATING ORAL at 14:11

## 2021-06-16 RX ADMIN — ONDANSETRON 4 MG: 4 TABLET, ORALLY DISINTEGRATING ORAL at 08:55

## 2021-06-16 RX ADMIN — DULOXETINE HYDROCHLORIDE 60 MG: 60 CAPSULE, DELAYED RELEASE ORAL at 19:34

## 2021-06-16 RX ADMIN — CALCITONIN SALMON 200 UNITS: 200 SPRAY, METERED NASAL at 19:39

## 2021-06-16 RX ADMIN — ASPIRIN 81 MG: 81 TABLET, CHEWABLE ORAL at 05:40

## 2021-06-16 RX ADMIN — CETIRIZINE HYDROCHLORIDE 20 MG: 10 TABLET, FILM COATED ORAL at 17:13

## 2021-06-16 RX ADMIN — ACETAMINOPHEN 650 MG: 325 TABLET, FILM COATED ORAL at 14:11

## 2021-06-16 RX ADMIN — ACETAMINOPHEN 650 MG: 325 TABLET, FILM COATED ORAL at 01:42

## 2021-06-16 RX ADMIN — CROMOLYN SODIUM 200 MG: 100 SOLUTION, CONCENTRATE ORAL at 08:00

## 2021-06-16 RX ADMIN — LEVOTHYROXINE SODIUM 75 MCG: 0.07 TABLET ORAL at 17:14

## 2021-06-16 RX ADMIN — HYDROCORTISONE 10 MG: 10 TABLET ORAL at 12:01

## 2021-06-16 RX ADMIN — FAMOTIDINE 20 MG: 20 TABLET ORAL at 17:14

## 2021-06-16 RX ADMIN — APIXABAN 5 MG: 5 TABLET, FILM COATED ORAL at 17:14

## 2021-06-16 RX ADMIN — ACETAMINOPHEN 650 MG: 325 TABLET, FILM COATED ORAL at 17:13

## 2021-06-16 RX ADMIN — SODIUM CHLORIDE: 9 INJECTION, SOLUTION INTRAVENOUS at 08:57

## 2021-06-16 RX ADMIN — ACETAMINOPHEN 650 MG: 325 TABLET, FILM COATED ORAL at 08:55

## 2021-06-16 RX ADMIN — MONTELUKAST 10 MG: 10 TABLET, FILM COATED ORAL at 17:14

## 2021-06-16 RX ADMIN — VANCOMYCIN HYDROCHLORIDE 2000 MG: 500 INJECTION, POWDER, LYOPHILIZED, FOR SOLUTION INTRAVENOUS at 19:09

## 2021-06-16 RX ADMIN — ALTEPLASE 2 MG: 2.2 INJECTION, POWDER, LYOPHILIZED, FOR SOLUTION INTRAVENOUS at 05:39

## 2021-06-16 RX ADMIN — CROMOLYN SODIUM 200 MG: 100 SOLUTION, CONCENTRATE ORAL at 17:15

## 2021-06-16 RX ADMIN — LIOTHYRONINE SODIUM 25 MCG: 25 TABLET ORAL at 19:34

## 2021-06-16 ASSESSMENT — PAIN DESCRIPTION - PAIN TYPE
TYPE: ACUTE PAIN

## 2021-06-16 ASSESSMENT — ENCOUNTER SYMPTOMS
FEVER: 0
SORE THROAT: 0
ABDOMINAL PAIN: 1
VOMITING: 0
BLURRED VISION: 0
HEADACHES: 0
DOUBLE VISION: 0
NAUSEA: 1
SPUTUM PRODUCTION: 0
SHORTNESS OF BREATH: 0
MYALGIAS: 0
DEPRESSION: 0
DIZZINESS: 1
FALLS: 1
ORTHOPNEA: 0
HEARTBURN: 0
CHILLS: 0
COUGH: 0
FOCAL WEAKNESS: 0
NECK PAIN: 0

## 2021-06-16 NOTE — CARE PLAN
Problem: Pain - Standard  Goal: Alleviation of pain or a reduction in pain to the patient’s comfort goal  Outcome: Progressing  Flowsheets  Taken 6/16/2021 0944  OB Pain Intervention: Medication - See MAR  Taken 6/16/2021 0855  Non Verbal Scale:   Calm   Unlabored Breathing  Pain Rating Scale (NPRS): 5  Note: Pt given Tylenol for abdominal pain relief.     Problem: Fluid Volume  Goal: Fluid volume balance will be maintained  Outcome: Progressing  Note: NS @ 75mL/hr     Problem: Respiratory  Goal: Patient will achieve/maintain optimum respiratory ventilation and gas exchange  Outcome: Progressing  Flowsheets  Taken 6/16/2021 0855  Deep Breathe and Cough: Performs Correctly  Taken 6/16/2021 0803  O2 Delivery Device: None - Room Air     Problem: Fall Risk  Goal: Patient will remain free from falls  Outcome: Progressing    The patient is Watcher - Medium risk of patient condition declining or worsening    Shift Goals  Clinical Goals: determine cause of syncope; IV abx; ID consult; monitor labs & VS  Patient Goals: rest; abd pain relief; free from syncope
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
## 2021-06-16 NOTE — ED NOTES
IV antibiotics started per MAR. Pt is resting in flores [facility], states all needs are met at this time.
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
## 2021-06-16 NOTE — ASSESSMENT & PLAN NOTE
Based on the history of syncope and recent changes in dosing upon last discharge from [facility], this could be the reason for syncope  Pt has been dealing with syncope for awhile  Was previously on Prednisone as well   Increased Cortef 10 mg twice daily - plan for DC with this dosing  Cortisol level in AM wnl    I tried contacting Pt's endocrinologist Dr. Alber Jeff' office and left a voicemail to call back.

## 2021-06-16 NOTE — ASSESSMENT & PLAN NOTE
Secondary to sepsis versus dehydration versus orthostatic etiology  CT head unremarkable  Continue telemetry monitoring  TSH low but free T4 within normal range  Pulmonary embolus unlikely since patient already anticoagulated  Doubt cardiac etiology     Recent hydrocortisone adjustment may be the culprit; reportedly she has been struggling with this syncope for awhile.   Has increased hydrocortisone to 10mg bid.    I tried contacting Pt's endocrinologist Dr. Alber Jeff' office and left a voicemail to call back.      6/17: Orthostatic negative; PT/OT eval appreciated. Pt has been encouraged to ambulate with assistance.

## 2021-06-16 NOTE — ASSESSMENT & PLAN NOTE
This is Sepsis Present on admission  SIRS criteria identified on admission include: Tachypnea, with respirations greater than 20 per minute  Source is upper respiratory vs GI   Sepsis protocol initiated  Fluid resuscitation ordered per protocol  IV antibiotics as appropriate for source of sepsis  While organ dysfunction may be noted elsewhere in this problem list or in the chart, degree of organ dysfunction does not meet CMS criteria for severe sepsis    Patient on long-term antibiotic with eravacycline for MRSA sinus tract infection  Presented to the ED with hypotension with SBP in 70s  Her Home IV Xerava 100 mg q12h is non-formulary   Lynnette ID consult appreciated - C/w vancomycin while inpatient  Follow-up blood cultures,   pro-Donis elevated , ESR, CRP wnl   Left maxillary sinus culture from 6/7 with MRSA 1 / 2

## 2021-06-16 NOTE — ASSESSMENT & PLAN NOTE
Likely in the setting of sepsis versus dehydration  Avoid nephrotoxic medications, NSAIDs  Continue with IV fluid normal saline at 75 cc/h  Strict I&O    Responding to IVF

## 2021-06-16 NOTE — CARE PLAN
The patient is: Watcher - Medium risk of patient condition declining or worsening    Progress made toward(s) clinical / shift goals:  Patient is free from falls.  Patient is hemodynamically stable since transfer to floor.  IV ABX therapy ongoing.    Patient is not progressing towards the following goals:  Patient remains high falls risk.  Patient still reporting left-sided abdominal/flank pain (awaiting orders from MD).          Problem: Pain - Standard  Goal: Alleviation of pain or a reduction in pain to the patient’s comfort goal  Outcome: Progressing     Problem: Knowledge Deficit - Standard  Goal: Patient and family/care givers will demonstrate understanding of plan of care, disease process/condition, diagnostic tests and medications  Outcome: Progressing     Problem: Hemodynamics  Goal: Patient's hemodynamics, fluid balance and neurologic status will be stable or improve  Outcome: Progressing     Problem: Fluid Volume  Goal: Fluid volume balance will be maintained  Outcome: Progressing     Problem: Urinary - Renal Perfusion  Goal: Ability to achieve and maintain adequate renal perfusion and functioning will improve  Outcome: Progressing     Problem: Respiratory  Goal: Patient will achieve/maintain optimum respiratory ventilation and gas exchange  Outcome: Progressing     Problem: Physical Regulation  Goal: Diagnostic test results will improve  Outcome: Progressing  Goal: Signs and symptoms of infection will decrease  Outcome: Progressing     Problem: Fall Risk  Goal: Patient will remain free from falls  Outcome: Progressing

## 2021-06-16 NOTE — FLOWSHEET NOTE
Pt ambulated to the bathroom SBA with RN. No issues. Pt had BM and urinated and returned back to bed with RN. Pt confirmed feeling ok no c/o dizziness or lightheadedness. Pt returned to bed and getting ready to put her feet up in bed. RN witnessed pt LOC, unresponsive on bed. Staff assist called. By the time extra staff came to bedside, pt A/Ox4, responding to questions appropriately. Per monitor tech, only noted change was pt HR tachy to 130s. BP initially per NIBP 49/25. RN unable to auscultate to obtain manual BP to confirm. Recheck on NIBP 121/83. Pt said dizziness resolved, feeling slightly nauseated. Dr. Fajardo contacted. Zofran & Tylenol ordered per pt request. Per Dr. Fajardo keep pt on BR today. No further orders at this time. Pt confirmed understanding. BA in place for reminder.

## 2021-06-16 NOTE — PROGRESS NOTES
4 Eyes Skin Assessment Completed by AILYN Fernández and AILYN Huffman.     Head WDL  Ears Small cyst on back of right ear  Nose WDL  Mouth WDL  Neck Posterior of neck is warm, pink, and edematous  Breast/Chest WDL  Shoulder Blades WDL  Spine WDL  (R) Arm/Elbow/Hand Abrasion with drainage to right posterior forearm.  Elbow red and blanching  (L) Arm/Elbow/Hand Area below PICC is red, excoriated, blistered.  Elbow is red and blanching.  Very small skin tear on posterior forearm; healing skin tear on hand proximal to 5th digit  Abdomen Incision/scar under pannus is red, excoriated, moist  Right hip/flank Small pink abrasion  Groin Red, excoriated, moist, blanching  Coccyx/Buttocks Red, excoriated, moist, blanching  (R) Leg Bruising to knee; generalized edema  (L) Leg Bruising to knee; generalized edema  (R) Heel/Foot/Toe Dry  (L) Heel/Foot/Toe Rash to heel- peeling, dry, red, blanching              Devices In Place Tele Box, PICC, PIV        Interventions In Place Pillows used for support/repositioning, pressure redistribution mattress in place, patient encouraged to self-turn in bed     Possible Skin Injury No     Pictures Uploaded Into Epic Yes  Wound Consult Placed Yes  RN Wound Prevention Protocol Ordered No

## 2021-06-16 NOTE — FLOWSHEET NOTE
Assumed care of patient at bedside report from Jolene MCDUFFIE RN. Updated on POC. Patient currently A & O x 4; on RA; up sba; C/o L sided abdominal acute pain. Call light within reach. Whiteboard updated. Fall precautions in place. Bed locked and in lowest position. All questions answered. No other needs indicated at this time.

## 2021-06-16 NOTE — ASSESSMENT & PLAN NOTE
Patient had nuclear stress test on previous admission, was unremarkable  Today her troponin 30, follow-up serial troponin  EKG with T wave inversions similar to previous admission no acute changes  Continue with aspirin 81 mg daily  Cardiology consult as outpatient

## 2021-06-16 NOTE — ASSESSMENT & PLAN NOTE
We will hold Coreg 25 mg twice daily, Lasix 40 mg daily in view of hypotension  Resume as tolerated for blood pressure

## 2021-06-16 NOTE — CARE PLAN
Problem: Nutritional:  Goal: Achieve adequate nutritional intake  Description: Patient will consume >50-75% of meals  6/16/2021 1311 by Marisela Castañeda R.D.  Outcome: Not Met    See RD note.

## 2021-06-16 NOTE — ED NOTES
[facility] called, stated they are contacting provider to inquire whether pt needs isolation room due to ongoing treatment for MRSA.

## 2021-06-16 NOTE — ED NOTES
Patient assisted to bedside commode for void and small BM. Patient's blood pressure maintained and the patient states she did not feel dizzy during the event.

## 2021-06-16 NOTE — THERAPY
Missed Therapy     Patient Name: Donna Isaac  Age:  55 y.o., Sex:  female  Medical Record #: [id number]  Today's Date: 6/16/2021    PT consult received. RN requesting hold therapy today, pt had syncopal event with RN prior to PT arrival. Pt pending further work-up. PT will defer and evaluate as able/appropriate.

## 2021-06-16 NOTE — PROGRESS NOTES
Pharmacy Vancomycin Kinetics Note for 6/16/2021     55 y.o. female on Vancomycin day # 1     Vancomycin Indication (AUC Dosing): Skin/skin structure infection    Provider specified end date: 06/16/21    Active Antibiotics (From admission, onward)    Ordered     Ordering Provider       Tue Jemal 15, 2021  5:52 PM    06/15/21 1752  MD Alert...Vancomycin per Pharmacy  PHARMACY TO DOSE     Question:  Indication(s) for vancomycin?  Answer:  Other (comments)  Comment:  MRSA from sinus culture, patient was on Xerava in the outpatient setting    Giuseppe Lim M.D.          Dosing Weight: 94.6 kg (208 lb 8.9 oz)      Admission History: Admitted on 6/15/2021 for Syncope and collapse [R55]  Pertinent history: History of recurrent sinusitis previously on meropenem and eravacycline. Patient presents today for syncopal event - recent sinus culture positive for MRSA, vancomycin initiated.    Allergies:     Ancef [cefazolin], Bactrim [sulfamethoxazole w-trimethoprim], Bee venom, Buprenorphine, Buprenorphine, Clindamycin, Contrast media with iodine [iodine], Doxycycline, Econazole, Econazole nitrate [ecoza], Flagyl  [metronidazole], Floxin otic, Floxin [ofloxacin], Gadolinium derivatives, Hydrocodone-acetaminophen, Iodine, Keflex, Keflex, Levaquin, Levaquin, Levofloxacin, Morphine, Naloxone, Naloxone, Nitrofurantoin, Nitrofurantoin, Norco [apap-fd&c yellow #10 [place]-hydrocodone], Nyquil, Oxycodone, Oxycodone hcl, Paricalcitol, Paricalcitol, Penicillins, Penicillins, Tape, Tramadol, Vicks dayquil cold, Ancef [cefazolin], Azithromycin, Bextra [valdecoxib], Flagyl [kdc:metronidazole+tartrazine], Gadolinium, Linezolid, Nyquil, Other drug, Other misc, Clindamycin, Clindamycin hcl, Codeine, Iodinated diagnostic agents  [diagnostic x-ray materials], Sulfa drugs, Tramadol, Tygacil [tigecycline], Bextra [valdecoxib], and Tape     Pertinent cultures to date:     Results     Procedure Component Value Units Date/Time    Urinalysis [[id number]]   "(Abnormal) Collected: 06/15/21 2110    Order Status: Sent Specimen: Urine Updated: 06/15/21 2149     Color Yellow     Character Clear     Specific Gravity 1.012     Ph 5.0     Glucose 500 mg/dL      Ketones Negative mg/dL      Protein Negative mg/dL      Bilirubin Negative     Urobilinogen, Urine 0.2     Nitrite Negative     Leukocyte Esterase Negative     Occult Blood Negative     Micro Urine Req see below     Comment: Microscopic examination not performed when specimen is clear  and chemically negative for protein, blood, leukocyte esterase  and nitrite.         Narrative:      Contact  If not done within the last 24 hours    BLOOD CULTURE x2 [[id number]] Collected: 06/15/21 1604    Order Status: Completed Specimen: Blood from Peripheral Updated: 06/15/21 1622    Narrative:      Per Hospital Policy: Only change Specimen Src: to \"Line\" if  specified by physician order.    BLOOD CULTURE x2 [[id number]] Collected: 06/15/21 1430    Order Status: Completed Specimen: Blood from Peripheral Updated: 06/15/21 1515    Narrative:      Per Hospital Policy: Only change Specimen Src: to \"Line\" if  specified by physician order.    SARS-CoV-2 PCR (24 hour In-House): Collect NP swab in [facility] [[id number]] Collected: 06/15/21 1435    Order Status: Completed Specimen: Respirate Updated: 06/15/21 1511     SARS-CoV-2 Source NP Swab    Narrative:      Have you been in close contact with a person who is suspected  or known to be positive for COVID-19 within the last 30 days  (e.g. last seen that person < 30 days ago)->Unknown    Blood Culture [[id number]] Collected: 06/15/21 0000    Order Status: Canceled Specimen: Other from Peripheral     Blood Culture [[id number]] Collected: 06/15/21 0000    Order Status: Canceled Specimen: Other from Peripheral     Blood Culture [[id number]] Collected: 06/15/21 0000    Order Status: Canceled Specimen: Other from Peripheral           Labs:     Estimated Creatinine Clearance: 59.4 mL/min (by C-G formula based " "on SCr of 1.18 mg/dL).  Recent Labs     06/15/21  1351   WBC 6.8   NEUTSPOLYS 70.70     Recent Labs     06/15/21  1351   BUN 31*   CREATININE 1.18   ALBUMIN 2.6*       Intake/Output Summary (Last 24 hours) at 2021 0051  Last data filed at 6/15/2021 1548  Gross per 24 hour   Intake 1000 ml   Output --   Net 1000 ml      /75   Pulse (!) 101   Temp 36.8 °C (98.2 °F) (Temporal)   Resp 16   Ht 1.626 m (5' 4\")   Wt 94.6 kg (208 lb 8.9 oz)   SpO2 94%  Temp (24hrs), Av.3 °C (97.4 °F), Min:36.1 °C (97 °F), Max:36.8 °C (98.2 °F)      List concerns for Vancomycin clearance:     Obesity;BUN/Scr ratio greater than 20:1    Pharmacokinetics:     AUC kinetics:   Ke (hr ^-1): 0.0537 hr^-1  Half life: 12.91 hr  Clearance: 4.131  Estimated TDD: .  Estimated Dose: 1282  Estimated interval: 14.9    A/P:     -  Vancomycin dose: 2000 mg IV q24h scheduled dosing     -  Next vancomycin level(s): at steady state    -  Predicted vancomycin AUC from initial AUC test calculator: 484 mg·hr/L    -  Comments: Anticipate patient will tolerate AUC based dosing at conservative interval. Plan to assess levels when patient at steady state. Pharmacy will continue to follow.       Nader Shepherd PharmD, BCPS   "

## 2021-06-17 PROBLEM — A41.02 SEPSIS DUE TO METHICILLIN RESISTANT STAPHYLOCOCCUS AUREUS (MRSA) (HCC): Status: RESOLVED | Noted: 2021-06-15 | Resolved: 2021-06-17

## 2021-06-17 LAB
CORTIS SERPL-MCNC: 5.1 UG/DL (ref 0–23)
TROPONIN T SERPL-MCNC: 27 NG/L (ref 6–19)
VANCOMYCIN PEAK SERPL-MCNC: 36 UG/ML (ref 20–40)

## 2021-06-17 PROCEDURE — A9270 NON-COVERED ITEM OR SERVICE: HCPCS | Performed by: STUDENT IN AN ORGANIZED HEALTH CARE EDUCATION/TRAINING PROGRAM

## 2021-06-17 PROCEDURE — 700111 HCHG RX REV CODE 636 W/ 250 OVERRIDE (IP): Performed by: STUDENT IN AN ORGANIZED HEALTH CARE EDUCATION/TRAINING PROGRAM

## 2021-06-17 PROCEDURE — 700105 HCHG RX REV CODE 258: Performed by: STUDENT IN AN ORGANIZED HEALTH CARE EDUCATION/TRAINING PROGRAM

## 2021-06-17 PROCEDURE — 97166 OT EVAL MOD COMPLEX 45 MIN: CPT

## 2021-06-17 PROCEDURE — 99232 SBSQ HOSP IP/OBS MODERATE 35: CPT | Performed by: STUDENT IN AN ORGANIZED HEALTH CARE EDUCATION/TRAINING PROGRAM

## 2021-06-17 PROCEDURE — 80202 ASSAY OF VANCOMYCIN: CPT

## 2021-06-17 PROCEDURE — 84484 ASSAY OF TROPONIN QUANT: CPT

## 2021-06-17 PROCEDURE — 700102 HCHG RX REV CODE 250 W/ 637 OVERRIDE(OP): Performed by: STUDENT IN AN ORGANIZED HEALTH CARE EDUCATION/TRAINING PROGRAM

## 2021-06-17 PROCEDURE — 770001 HCHG ROOM/CARE - MED/SURG/GYN PRIV*

## 2021-06-17 PROCEDURE — 82533 TOTAL CORTISOL: CPT

## 2021-06-17 PROCEDURE — 97161 PT EVAL LOW COMPLEX 20 MIN: CPT

## 2021-06-17 RX ADMIN — CETIRIZINE HYDROCHLORIDE 20 MG: 10 TABLET, FILM COATED ORAL at 04:36

## 2021-06-17 RX ADMIN — HYDROCORTISONE 10 MG: 10 TABLET ORAL at 17:09

## 2021-06-17 RX ADMIN — LEVOTHYROXINE SODIUM 75 MCG: 0.07 TABLET ORAL at 17:07

## 2021-06-17 RX ADMIN — CROMOLYN SODIUM 200 MG: 100 SOLUTION, CONCENTRATE ORAL at 07:00

## 2021-06-17 RX ADMIN — VANCOMYCIN HYDROCHLORIDE 2000 MG: 500 INJECTION, POWDER, LYOPHILIZED, FOR SOLUTION INTRAVENOUS at 17:06

## 2021-06-17 RX ADMIN — DULOXETINE HYDROCHLORIDE 60 MG: 60 CAPSULE, DELAYED RELEASE ORAL at 20:51

## 2021-06-17 RX ADMIN — CETIRIZINE HYDROCHLORIDE 20 MG: 10 TABLET, FILM COATED ORAL at 17:06

## 2021-06-17 RX ADMIN — MONTELUKAST 10 MG: 10 TABLET, FILM COATED ORAL at 17:07

## 2021-06-17 RX ADMIN — NYSTATIN: 100000 POWDER TOPICAL at 18:00

## 2021-06-17 RX ADMIN — FAMOTIDINE 20 MG: 20 TABLET ORAL at 04:35

## 2021-06-17 RX ADMIN — Medication 2000 UNITS: at 04:35

## 2021-06-17 RX ADMIN — HYDROCORTISONE 10 MG: 10 TABLET ORAL at 04:36

## 2021-06-17 RX ADMIN — APIXABAN 5 MG: 5 TABLET, FILM COATED ORAL at 17:07

## 2021-06-17 RX ADMIN — CALCITONIN SALMON 200 UNITS: 200 SPRAY, METERED NASAL at 20:51

## 2021-06-17 RX ADMIN — LIOTHYRONINE SODIUM 25 MCG: 25 TABLET ORAL at 20:51

## 2021-06-17 RX ADMIN — FAMOTIDINE 20 MG: 20 TABLET ORAL at 17:07

## 2021-06-17 RX ADMIN — SODIUM CHLORIDE: 9 INJECTION, SOLUTION INTRAVENOUS at 22:17

## 2021-06-17 RX ADMIN — NYSTATIN: 100000 POWDER TOPICAL at 04:36

## 2021-06-17 RX ADMIN — CROMOLYN SODIUM 200 MG: 100 SOLUTION, CONCENTRATE ORAL at 11:21

## 2021-06-17 RX ADMIN — ASPIRIN 81 MG: 81 TABLET, CHEWABLE ORAL at 04:35

## 2021-06-17 RX ADMIN — ACETAMINOPHEN 650 MG: 325 TABLET, FILM COATED ORAL at 18:23

## 2021-06-17 RX ADMIN — SODIUM CHLORIDE: 9 INJECTION, SOLUTION INTRAVENOUS at 04:38

## 2021-06-17 RX ADMIN — SODIUM CHLORIDE: 9 INJECTION, SOLUTION INTRAVENOUS at 11:28

## 2021-06-17 RX ADMIN — CROMOLYN SODIUM 200 MG: 100 SOLUTION, CONCENTRATE ORAL at 17:09

## 2021-06-17 RX ADMIN — APIXABAN 5 MG: 5 TABLET, FILM COATED ORAL at 04:36

## 2021-06-17 ASSESSMENT — ACTIVITIES OF DAILY LIVING (ADL): TOILETING: INDEPENDENT

## 2021-06-17 ASSESSMENT — COGNITIVE AND FUNCTIONAL STATUS - GENERAL
SUGGESTED CMS G CODE MODIFIER MOBILITY: CI
DAILY ACTIVITIY SCORE: 19
HELP NEEDED FOR BATHING: A LITTLE
SUGGESTED CMS G CODE MODIFIER DAILY ACTIVITY: CK
TOILETING: A LITTLE
MOBILITY SCORE: 23
DRESSING REGULAR UPPER BODY CLOTHING: A LITTLE
DRESSING REGULAR LOWER BODY CLOTHING: A LOT
CLIMB 3 TO 5 STEPS WITH RAILING: A LITTLE

## 2021-06-17 ASSESSMENT — ENCOUNTER SYMPTOMS
CHILLS: 0
GASTROINTESTINAL NEGATIVE: 1
SHORTNESS OF BREATH: 0
DIZZINESS: 1
SORE THROAT: 0
DEPRESSION: 0
MYALGIAS: 0
VOMITING: 0
NAUSEA: 1
CARDIOVASCULAR NEGATIVE: 1
HEADACHES: 0
SINUS PAIN: 1
ORTHOPNEA: 0
FOCAL WEAKNESS: 0
FALLS: 1
MUSCULOSKELETAL NEGATIVE: 1
DOUBLE VISION: 0
HEARTBURN: 0
SPUTUM PRODUCTION: 0
BLURRED VISION: 0
NECK PAIN: 0
FEVER: 0
ABDOMINAL PAIN: 1
COUGH: 0
RESPIRATORY NEGATIVE: 1

## 2021-06-17 ASSESSMENT — GAIT ASSESSMENTS
ASSISTIVE DEVICE: FRONT WHEEL WALKER
DISTANCE (FEET): 40
DISTANCE (FEET): 20
DEVIATION: NO DEVIATION
GAIT LEVEL OF ASSIST: MINIMAL ASSIST

## 2021-06-17 ASSESSMENT — FIBROSIS 4 INDEX: FIB4 SCORE: 1.73

## 2021-06-17 ASSESSMENT — PAIN DESCRIPTION - PAIN TYPE
TYPE: ACUTE PAIN
TYPE: ACUTE PAIN

## 2021-06-17 NOTE — PROGRESS NOTES
Hospital Medicine Daily Progress Note    Date of Service  6/16/2021    Chief Complaint  55 y.o. female admitted 6/15/2021 with syncope    Hospital Course  Per Dr. Lim' HPI:   55 y.o. F with hx of PE on eliquis, known AAA, adrenal insufficiency on Cortef and recent admission to [facility] for Sepsis from recurrent sinusitis status post Merrem and Eravacycline for recurrent sinusitis, presented to the ER 6/15/2021 with chief complaint of syncopal . Patient reported 6 episodes of syncope since yesterday (6/14). Patient hit her head 2 times during the syncopal episode: 11 PM yesterday (6/14) and 6 AM today (6/15).  Patient also reported new onset of epigastric abdominal pain rating sharp pain 6 x 10 in severity spread to the left upper quadrant.     In the ER, patient was found to have hypotension with SBP in 70s, respiratory rate of 22, heart rate of 58, patient EKG showed T wave inversion in anterior leads which is similar to her prior presentation, troponin was 30.  Patient had recent nuclear medicine study from 5/31/2021 was negative for any ischemia.     Sepsis was considered with source being upper respiratory vs GI; Pt was started on Vancomycin for MRSA. Pt was subsequently admitted also to monitor syncope.     Interval Problem Update  6/16  Vitals reviewed; afebrile.  BP is labile. She did have another syncopal episode early on the unit. Tachycardia resolved.   Pain scale reported - 5-7 in left abdomen  Blood glucose in last 24hrs - around 100    At bedside, reported LUQ pain. Current findings discussed with normal abd CT and essentially normal UA.     ID consult appreciated     I tried contacting Pt's endocrinologist Dr. Alber Jeff' office and left a voicemail to call back.      Labs reviewed   TSH low but normal Free T4 range   Lactate 2  CBC and BMP grossly unremarkable     Imagings done reviewed     Consultants/Specialty  Lynnette ID     Code Status  Full Code    Disposition  TBD - likely home when  medically cleared     Discussed with patient, patient's nurse and with multidisciplinary team during rounds including , pharmacist and charge nurse.      Review of Systems  Review of Systems   Constitutional: Negative for chills, fever and malaise/fatigue.   HENT: Negative for congestion, sore throat and tinnitus.    Eyes: Negative for blurred vision and double vision.   Respiratory: Negative for cough, sputum production and shortness of breath.    Cardiovascular: Negative for chest pain, orthopnea and leg swelling.   Gastrointestinal: Positive for abdominal pain and nausea. Negative for heartburn and vomiting.   Genitourinary: Negative for dysuria, frequency and urgency.   Musculoskeletal: Positive for falls. Negative for myalgias and neck pain.   Neurological: Positive for dizziness. Negative for focal weakness and headaches.   Psychiatric/Behavioral: Negative for depression.        Physical Exam  Temp:  [36.2 °C (97.1 °F)-37.2 °C (99 °F)] 36.4 °C (97.5 °F)  Pulse:  [] 88  Resp:  [16-22] 17  BP: ()/(25-90) 119/90  SpO2:  [91 %-97 %] 91 %    Physical Exam  Vitals and nursing note reviewed.   Constitutional:       General: She is not in acute distress.     Appearance: Normal appearance. She is not ill-appearing.   HENT:      Head: Normocephalic and atraumatic.      Mouth/Throat:      Mouth: Mucous membranes are moist.      Pharynx: Oropharynx is clear.   Eyes:      General: No scleral icterus.     Conjunctiva/sclera: Conjunctivae normal.   Cardiovascular:      Rate and Rhythm: Normal rate and regular rhythm.      Pulses: Normal pulses.      Heart sounds: Normal heart sounds.   Pulmonary:      Effort: Pulmonary effort is normal. No respiratory distress.      Breath sounds: Normal breath sounds. No wheezing.   Abdominal:      General: Bowel sounds are normal. There is no distension.      Palpations: Abdomen is soft.      Tenderness: There is no abdominal tenderness.   Musculoskeletal:          General: No swelling or tenderness. Normal range of motion.   Skin:     General: Skin is warm and dry.      Capillary Refill: Capillary refill takes less than 2 seconds.   Neurological:      General: No focal deficit present.      Mental Status: She is alert and oriented to person, place, and time. Mental status is at baseline.   Psychiatric:         Mood and Affect: Mood normal.         Fluids    Intake/Output Summary (Last 24 hours) at 6/16/2021 1903  Last data filed at 6/15/2021 2158  Gross per 24 hour   Intake 500.1 ml   Output --   Net 500.1 ml       Laboratory  Recent Labs     06/15/21  1351 06/16/21  0305   WBC 6.8 6.5   RBC 4.08* 4.00*   HEMOGLOBIN 12.3 12.3   HEMATOCRIT 41.0 41.0   .5* 102.5*   MCH 30.1 30.8   MCHC 30.0* 30.0*   RDW 63.4* 65.9*   PLATELETCT 390 300   MPV 10.4 10.4     Recent Labs     06/15/21  1351 06/16/21  0305   SODIUM 139 139   POTASSIUM 4.6 4.5   CHLORIDE 110 111   CO2 19* 18*   GLUCOSE 119* 100*   BUN 31* 20   CREATININE 1.18 0.71   CALCIUM 8.4* 8.1*     Recent Labs     06/15/21  1851   INR 1.60*               Imaging  DX-CHEST-PORTABLE (1 VIEW)   Final Result         1. No acute cardiopulmonary abnormalities are identified.      CT-HEAD W/O   Final Result      1.  No acute intracranial abnormality is identified.   2.  Atrophy   3.  There are periventricular and subcortical white matter changes present.  This finding is nonspecific and could be from previous small vessel ischemia, demyelination, or gliosis.   4.  Paranasal sinus disease.   5.  Trace fluid in the mastoid air cells bilaterally.      CT-RENAL COLIC EVALUATION(A/P W/O)   Final Result         1. No urinary tract calculus identified. No renal collecting system dilatation.      2. No evidence of inflammatory change in the abdomen or pelvis.  The study is however limited due to nonuse of intravenous contrast      3. No abdominal aortic aneurysm.           Assessment/Plan  Adrenal insufficiency (Valentín's disease)  (HCC)- (present on admission)  Assessment & Plan  Based on the history of syncope and recent changes in dosing upon last discharge from [facility], this could be the reason for syncope  Pt has been dealing with syncope for awhile  Was previously on Prednisone as well   Increased Cortef 10 mg twice daily  Follow cortisol level in AM    I tried contacting Pt's endocrinologist Dr. Alber Jeff' office and left a voicemail to call back.            Sepsis due to methicillin resistant Staphylococcus aureus (MRSA) ([facility])- (present on admission)  Assessment & Plan  This is Sepsis Present on admission  SIRS criteria identified on admission include: Tachypnea, with respirations greater than 20 per minute  Source is upper respiratory vs GI   Sepsis protocol initiated  Fluid resuscitation ordered per protocol  IV antibiotics as appropriate for source of sepsis  While organ dysfunction may be noted elsewhere in this problem list or in the chart, degree of organ dysfunction does not meet CMS criteria for severe sepsis    Patient on long-term antibiotic with eravacycline for MRSA sinus tract infection  Presented to the ED with hypotension with SBP in 70s  Her Home IV Xerava 100 mg q12h is non-formulary   Lynnette ID consult appreciated - C/w vancomycin while inpatient  Follow-up blood cultures,   pro-Donis elevated , ESR, CRP wnl   Left maxillary sinus culture from 6/7 with MRSA 1 / 2            Abnormal nuclear stress test- (present on admission)  Assessment & Plan  Patient had nuclear stress test on previous admission, was unremarkable  Today her troponin 30, follow-up serial troponin  EKG with T wave inversions similar to previous admission no acute changes  Continue with aspirin 81 mg daily  Cardiology consult as outpatient    Pulmonary embolism (HCC)- (present on admission)  Assessment & Plan  Continue with Eliquis 5 mg twice daily    Acute pulmonary embolism (HCC)- (present on admission)  Assessment & Plan  Management Eliquis 5 mg  twice daily    Acute kidney injury (HCC)  Assessment & Plan  Likely in the setting of sepsis versus dehydration  Avoid nephrotoxic medications, NSAIDs  Continue with IV fluid normal saline at 75 cc/h  Strict I&O    Responding to IVF    Hypothyroidism- (present on admission)  Assessment & Plan  C/w home dosing    Syncope- (present on admission)  Assessment & Plan  Secondary to sepsis versus dehydration versus orthostatic etiology  CT head unremarkable  Continue telemetry monitoring  TSH low but free T4 within normal range  Pulmonary embolus unlikely since patient already anticoagulated  Doubt cardiac etiology     Recent hydrocortisone adjustment may be the culprit; reportedly she has been struggling with this syncope for awhile.   Has increased hydrocortisone to 10mg bid.    I tried contacting Pt's endocrinologist Dr. Alber Jeff' office and left a voicemail to call back.          Aortic root dilatation (HCC)- (present on admission)  Assessment & Plan  Patient had a recent CT during previous admission which showed abdominal aortic aneurysm 3 mm  Today she denies any chest pain, but admits to abdominal pain  Right upper extremity blood pressure under 112/56 in the left forearm and 105/55 in right forearm  Chest x-ray stable cardiopulmonary status   Unlikely to be acute aortic dissection      Chronic systolic heart failure (HCC)- (present on admission)  Assessment & Plan  We will hold Coreg 25 mg twice daily, Lasix 40 mg daily in view of hypotension  Resume as tolerated for blood pressure    Gastroesophageal reflux disease without esophagitis- (present on admission)  Assessment & Plan  Continue with Pepcid twice daily       VTE prophylaxis: Eliquis

## 2021-06-17 NOTE — CARE PLAN
The patient is Stable - Low risk of patient condition declining or worsening    Shift Goals  Clinical Goals: determine cause of syncope; IV abx; ID consult; monitor labs & VS  Patient Goals: rest; abd pain relief; free from syncope      Problem: Pain - Standard  Goal: Alleviation of pain or a reduction in pain to the patient’s comfort goal  Outcome: Progressing     Problem: Knowledge Deficit - Standard  Goal: Patient and family/care givers will demonstrate understanding of plan of care, disease process/condition, diagnostic tests and medications  Outcome: Progressing     Problem: Fluid Volume  Goal: Fluid volume balance will be maintained  Outcome: Progressing

## 2021-06-17 NOTE — FLOWSHEET NOTE
Monitor Summary  Rhythm: SR/ ST  Rate:   Ectopy: R PAC  18 / 08 / 37    12 Hour Chart Check

## 2021-06-17 NOTE — DOCUMENTATION QUERY
[facility]                                                                       Query Response Note      PATIENT:               CARMINA MORAN  ACCT #:                  [id number]  MRN:                     [id number]  :                      1965  ADMIT DATE:       6/15/2021 1:20 PM  DISCH DATE:          RESPONDING  PROVIDER #:        [id number]           QUERY TEXT:    The sepsis due to MRSA has been documented in the H&P. Admit SIRS 0/4. Blood Cultures x 2 collected 6/15 are negative.  Please provide additional clinical indicators/SIRS criteria supportive of the documented diagnosis of sepsis.     Note: If an appropriate response is not listed below, please respond with a new note.    Sepsis - real or suspected infection plus 2 or more SIRS criteria  Temp <96.8 or >101  HR >90  RR >20  WBC <4,000 or > 12,000  >10% bandemia         The patient's Clinical Indicators include:  Admit SIRS 0/4  Admit VS: BP: 84/54; T: 97 F; HR: 57; RR: 14  6/15 WBC's: 6.8; Lactic Acid 1.9; Procalcitonin: 2.53  6/15 Blood Cultures X 2: NEG  6/15 CXR: No acute cardiopulmonary abnormalities    ID Consult: Hypotensive, suspect d/t adrenal insufficiency.   H&P: Sepsis 2/2 MRSA, source upper respiratory vs GI  Treatment: IVNS bolus; ID consult; vancomycin; lab/imaging  Risk Factors: Culture  w/ MRSA 1 / 2; chronic illness    Thank You,  Nighat Holland RN  Clinical    Connect via [employer]  Options provided:   -- Sepsis exists, (please document additional + SIRS criteria and clinical indicators)   -- Sepsis does not exist - amended documentation in the medical record and updated problem list   -- Unable to provide additional clarity regarding the diagnosis   -- Unable to determine      Query created by: Nighat Holland on 2021 10:24 AM    RESPONSE TEXT:    Sepsis does not exist - amended documentation in the medical record  and updated problem list          Electronically signed by:  YG ARANGO MD 6/17/2021 11:46 AM

## 2021-06-17 NOTE — PROGRESS NOTES
Assumed care of pt. Bedside report received from Kelli Velasquez. Pt was updated on plan of care. Call light, phone and personal belongings in reach. Bed alarm on and working properly, bed in lowest position, and locked.

## 2021-06-17 NOTE — THERAPY
Occupational Therapy   Initial Evaluation     Patient Name: Donna Isaac  Age:  55 y.o., Sex:  female  Medical Record #: [id number]  Today's Date: 6/17/2021     Precautions  Precautions: Fall Risk  Comments: (P) orthostatics, syncopal episodes in standing    Assessment  Patient is 55 y.o. female with a diagnosis of syncope.  Additional factors influencing patient status / progress: good family support, all DME in place, motivated for participation/ independence. Will benefit from OT to focus on ADLs, functional transfers, functional endurance, balance , strengthening.      Plan    Recommend Occupational Therapy 3 times per week until therapy goals are met for the following treatments:  Adaptive Equipment, Self Care/Activities of Daily Living, Therapeutic Activities and Therapeutic Exercises.    DC Equipment Recommendations: (P) None  Discharge Recommendations: (P) Recommend home health for continued occupational therapy services     Subjective    Pleasant and cooperative     Objective       06/17/21 0745   Total Time Spent   Total Time Spent (Mins)    Charge Group   OT Evaluation OT Evaluation Mod   Initial Contact Note    Initial Contact Note Order Received and Verified, Occupational Therapy Evaluation in Progress with Full Report to Follow.   Prior Living Situation   Prior Services   (family assist as needed)   Housing / Facility 2 Story House   Steps Into Home 1   Steps In Home   (8 steps, landing, 8 more steps)   Rail Right Rail  (Steps in Home)   Elevator No   Bathroom Set up Walk In Shower;Bathtub / Shower Combination  (walk in downstairs, tub with seat and grab bar upstairs)   Equipment Owned Front-Wheel Walker;Wheelchair  (owned by friend)   Lives with - Patient's Self Care Capacity Spouse  (staying with family friends)   Prior Level of ADL Function   Self Feeding Independent   Grooming / Hygiene Independent   Bathing Independent   Dressing Independent   Toileting Independent   Prior Level of IADL  Function   Medication Management Independent   Laundry Requires Assist   Kitchen Mobility Independent   Finances Requires Assist   Home Management Requires Assist   Shopping Requires Assist   Prior Level Of Mobility Independent Without Device in Home   Driving / Transportation Relatives / Others Provide Transportation   History of Falls   History of Falls Yes   Date of Last Fall   (5/25/21, syncope with fall)   Precautions   Precautions Fall Risk   Comments orthostatics, syncopal episodes in standing   Vitals   Blood Pressure 105/70  (manual, unable to obtain with machine)   O2 Delivery Device None - Room Air   Vitals Comments faint pulse, unable to obtain BP with machine.   Pain 0 - 10 Group   Therapist Pain Assessment   (not rated, B ankles tender to touch)   Cognition    Cognition / Consciousness WDL   Level of Consciousness Alert   Passive ROM Upper Body   Passive ROM Upper Body WDL   Active ROM Upper Body   Active ROM Upper Body  WDL   Dominant Hand Right   Strength Upper Body   Upper Body Strength  X   Gross Strength Generalized Weakness, Equal Bilaterally.    Sensation Upper Body   Upper Extremity Sensation  WDL   Upper Body Muscle Tone   Upper Body Muscle Tone  WDL   Coordination Upper Body   Coordination WDL   Balance Assessment   Sitting Balance (Static) Fair   Sitting Balance (Dynamic) Fair   Standing Balance (Static) Fair   Standing Balance (Dynamic) Fair   Weight Shift Sitting Good   Weight Shift Standing Good   Comments using FWW   Bed Mobility    Supine to Sit Supervised   Sit to Supine Supervised   Scooting Supervised   Rolling Supervised   ADL Assessment   Eating Supervision   Grooming Supervision;Seated   Bathing   (NT)   Upper Body Dressing Supervision   Lower Body Dressing Moderate Assist   Toileting Minimal Assist   How much help from another person does the patient currently need...   Putting on and taking off regular lower body clothing? 2   Bathing (including washing, rinsing, and drying)? 3    Toileting, which includes using a toilet, bedpan, or urinal? 3   Putting on and taking off regular upper body clothing? 3   Taking care of personal grooming such as brushing teeth? 4   Eating meals? 4   6 Clicks Daily Activity Score 19   Functional Mobility   Sit to Stand Supervised   Bed, Chair, Wheelchair Transfer Supervised   Toilet Transfers Supervised   Transfer Method Stand Pivot   Distance (Feet) 20   # of Times Distance was Traveled 2   Visual Perception   Visual Perception  WDL   Activity Tolerance   Sitting Edge of Bed 5 mins   Standing 5 mins   Patient / Family Goals   Patient / Family Goal #1 feel better   Short Term Goals   Short Term Goal # 1 tolerate stand at sink x 10 minutes prior to resting for ADL completion   Short Term Goal # 2 distant supervision for toileting tasks   Short Term Goal # 3 set up for LB dressing, appropriate use of AE   Short Term Goal # 4 tolerate 15 minutes of continuous functional activity, prior to resting   Education Group   Role of Occupational Therapist Patient Response Patient;Acceptance;Explanation;Demonstration;Verbal Demonstration;Action Demonstration   Problem List   Problem List Decreased Active Daily Living Skills;Decreased Upper Extremity Strength Right;Decreased Upper Extremity Strength Left;Decreased Functional Mobility;Decreased Activity Tolerance   Anticipated Discharge Equipment and Recommendations   DC Equipment Recommendations None   Discharge Recommendations Recommend home health for continued occupational therapy services   Interdisciplinary Plan of Care Collaboration   IDT Collaboration with  Nursing;Physical Therapist   Patient Position at End of Therapy In Bed;Call Light within Reach;Tray Table within Reach;Phone within Reach   Collaboration Comments report given   Session Information   Date / Session Number  6/17,1 ( 1/3, 6/23)   Priority 3

## 2021-06-17 NOTE — THERAPY
Physical Therapy   Initial Evaluation     Patient Name: Donna Isaac  Age:  55 y.o., Sex:  female  Medical Record #: [id number]  Today's Date: 6/17/2021     Precautions: Fall Risk    Assessment  Patient is 55 y.o. female with a diagnosis of 6 episodes of syncope, past medical history of Eliquis, history of PE, known abdominal aortic aneurysm, history of adrenal insufficiency, sepsis from recurrent sinusitis. Negative head CT this admit.   .  Additional factors influencing patient status / progress : today, BP was difficult to obtain due to so faint, but was 105/70 while seated at EOB. Pt needed no assist to get OOB, or with transfers or ambulation using FWW. Pt lives with family who assist as needed. Pt does have 2x 8 stairs at home. PT to follow to trial stairs and longer ambulation..      Plan    Recommend Physical Therapy 3 times per week until therapy goals are met for the following treatments:  Bed Mobility, Gait Training, Neuro Re-Education / Balance, Stair Training and Therapeutic Activities    DC Equipment Recommendations: None  Discharge Recommendations: Recommend home health for continued physical therapy services (has been working on stair safety/endurance with  PT)        Objective       06/17/21 0857   Vitals   Patient BP Position Sitting   Blood Pressure 105/70   Vitals Comments difficult to obtain BP due to pulse so faint   Prior Living Situation   Prior Services   (family assist as needed)   Housing / Facility 2 Story House   Steps Into Home 1   Steps In Home   (2x 8 stairs with landing)   Rail Right Rail  (Steps in Home)   Elevator No   Equipment Owned Front-Wheel Walker;Wheelchair  (borrows from friend)   Lives with - Patient's Self Care Capacity Spouse  (and family friends )   Prior Level of Functional Mobility   Bed Mobility Independent   Transfer Status Independent   Ambulation Independent   Distance Ambulation (Feet)   (household)   Assistive Devices Used Front-Wheel Walker  (as needed,  long h/o ankle problems. )   Wheelchair Independent   Stairs Required Assist   Comments Pt is never alone on stairs, always has family to help.    Gait Analysis   Gait Level Of Assist Minimal Assist  ((cg for safety but no LOB))   Assistive Device Front Wheel Walker   Distance (Feet) 40   Comments short distance ambulation due to BP readings difficult to monitor when up ambulating, faint Heart beat. Asymptomatic today during ambulation.    Bed Mobility    Supine to Sit Supervised   Sit to Supine Supervised   Scooting Supervised   Rolling Supervised   Functional Mobility   Sit to Stand Supervised   Bed, Chair, Wheelchair Transfer Supervised   Short Term Goals    Short Term Goal # 1 Pt will ambulate x 200 feet using FWW with supervision in 6 visits to improve functional indep.    Short Term Goal # 2 Pt will go up/down 1 flight stairs with supervision in 6 visits to access home.    Problem List    Problems Decreased Activity Tolerance   Anticipated Discharge Equipment and Recommendations   DC Equipment Recommendations None   Discharge Recommendations Recommend home health for continued physical therapy services  (has been working on stair safety/endurance with  PT)

## 2021-06-17 NOTE — PROGRESS NOTES
"Infectious Disease Progress Note    Author: Negar Subramanian M.D. Date & Time created: 6/17/2021  1:08 PM    Interval History:  Lianna while in the hospital    As an outpatient has been on IV eravacycline since 6/9 6/17: Has tried walking twice, but not far.  Still feels a bit woozy, but has not had any syncopal episodes.  She thinks she has a telemed appt with Dr Quiñonez on 7/9.  Did speak to him on the phone on Monday.      Labs Reviewed    Review of Systems:  Review of Systems   Constitutional: Positive for malaise/fatigue. Negative for chills and fever.   HENT: Positive for sinus pain.    Respiratory: Negative.    Cardiovascular: Negative.    Gastrointestinal: Negative.    Musculoskeletal: Negative.        Physical Exam:  Physical Exam  Constitutional:       Appearance: She is obese.   HENT:      Head:      Comments: Round facies.  Cardiovascular:      Rate and Rhythm: Normal rate and regular rhythm.      Pulses: Normal pulses.   Pulmonary:      Effort: Pulmonary effort is normal.      Breath sounds: Normal breath sounds.   Abdominal:      General: Bowel sounds are normal.      Palpations: Abdomen is soft.   Musculoskeletal:         General: Normal range of motion.   Skin:     General: Skin is warm.   Neurological:      Mental Status: She is alert.         Labs:  Recent Results (from the past 24 hour(s))   CORTISOL    Collection Time: 06/17/21  4:47 AM   Result Value Ref Range    Cortisol 5.1 0.0 - 23.0 ug/dL     Results     Procedure Component Value Units Date/Time    SARS-CoV-2 PCR (24 hour In-House): Collect NP swab in [facility] [[id number]] Collected: 06/15/21 1435    Order Status: Completed Specimen: Respirate Updated: 06/16/21 1047     SARS-CoV-2 Source NP Swab     SARS-CoV-2 by PCR NotDetected     Comment: PATIENTS: Important information regarding your results and instructions can  be found at [email or web address]   \"After your  Covid-19 Test\"    [facility] providers: PLEASE REFER TO " "DE-ESCALATION AND RETESTING PROTOCOL  on [email or web address]    **The TaqPath COVID-19 SARS-CoV-2 RT-PCR test has been made available for  use under the Emergency Use Authorization (EUA) only.         Narrative:      Have you been in close contact with a person who is suspected  or known to be positive for COVID-19 within the last 30 days  (e.g. last seen that person < 30 days ago)->Unknown    BLOOD CULTURE x2 [[id number]] Collected: 06/15/21 1430    Order Status: Completed Specimen: Blood from Peripheral Updated: 06/16/21 0850     Significant Indicator NEG     Source BLD     Site PERIPHERAL     Culture Result No Growth  Note: Blood cultures are incubated for 5 days and  are monitored continuously.Positive blood cultures  are called to the RN and reported as soon as  they are identified.      Narrative:      Per Hospital Policy: Only change Specimen Src: to \"Line\" if  specified by physician order.  Right AC    BLOOD CULTURE x2 [[id number]] Collected: 06/15/21 1604    Order Status: Completed Specimen: Blood from Peripheral Updated: 06/16/21 0850     Significant Indicator NEG     Source BLD     Site PERIPHERAL     Culture Result No Growth  Note: Blood cultures are incubated for 5 days and  are monitored continuously.Positive blood cultures  are called to the RN and reported as soon as  they are identified.      Narrative:      Per Hospital Policy: Only change Specimen Src: to \"Line\" if  specified by physician order.  Right AC    Urinalysis [[id number]]  (Abnormal) Collected: 06/15/21 2110    Order Status: Sent Specimen: Urine Updated: 06/15/21 2149     Color Yellow     Character Clear     Specific Gravity 1.012     Ph 5.0     Glucose 500 mg/dL      Ketones Negative mg/dL      Protein Negative mg/dL      Bilirubin Negative     Urobilinogen, Urine 0.2     Nitrite Negative     Leukocyte Esterase Negative     Occult Blood Negative     Micro Urine Req see below     Comment: Microscopic examination not performed when specimen is " clear  and chemically negative for protein, blood, leukocyte esterase  and nitrite.         Narrative:      Contact  If not done within the last 24 hours    Blood Culture [[id number]] Collected: 06/15/21 0000    Order Status: Canceled Specimen: Other from Peripheral     Blood Culture [[id number]] Collected: 06/15/21 0000    Order Status: Canceled Specimen: Other from Peripheral     Blood Culture [[id number]] Collected: 06/15/21 0000    Order Status: Canceled Specimen: Other from Peripheral         Hemodynamics:  Temp (24hrs), Av.2 °C (97.1 °F), Min:36 °C (96.8 °F), Max:36.4 °C (97.5 °F)  Temperature: 36.3 °C (97.3 °F)  Pulse  Av.7  Min: 54  Max: 114   Blood Pressure: 121/79     PICC Single Lumen Left (Active)   Site Assessment Clean;Dry;Intact;Edematous;Painful 21   Line Status Blood return noted;Flushed;Scrubbed the hub prior to access;Saline locked;Lab draw 21   Dressing Type Biopatch;Occlusive;Securing device;Transparent 21   Dressing Status Clean;Dry;Intact 21   Dressing Intervention N/A 21   Dressing Change Due 21 0855   Line Necessity Assessed Antibiotic Therapy Greater than 7 Days 21     Wound:  @WOUNDLDA(4)@     Fluids:  Intake/Output                             06/15/21 07 - 21 0659 21 07 - 21 0659 21 07 - 21 0659     [id number] 9606-5988 Total 7400-3054 8916-4539 Total 8070-9596 8214-4580 Total                    Intake    P.O.  --  -- --  --  250 250  --  -- --    P.O. -- -- -- -- 250 250 -- -- --    I.V.    --   --  -- --  --  -- --    Volume (mL) (NS infusion 1,000 mL) 1000 -- 1000 -- -- -- -- -- --    Volume (mL) (NS infusion 1,000 mL) 1000 -- 1000 -- -- -- -- -- --    IV Piggyback  --  500.1 500.1  --  -- --  --  -- --    Volume (mL) (vancomycin (VANCOCIN) 2,750 mg in  mL IVPB) -- 500.1 500.1 -- -- -- -- -- --    Total Intake 2000 500.1 2500.1 -- 250 250 -- -- --        Output    Urine  --  -- --  --  300 300  400  -- 400    Number of Times Voided -- -- -- 3 x 2 x 5 x 1 x -- 1 x    Urine Void (mL) -- -- -- -- 300 300 400 -- 400    Stool  --  -- --  --  -- --  --  -- --    Number of Times Stooled -- 2 x 2 x 1 x -- 1 x -- -- --    Total Output -- -- -- -- 300 300 400 -- 400       Net I/O     2000 500.1 2500.1 -- -50 -50 -400 -- -400           GI/Nutrition:  Orders Placed This Encounter   Procedures   • Diet Order Diet: Regular     Standing Status:   Standing     Number of Occurrences:   1     Order Specific Question:   Diet:     Answer:   Regular [1]     Medications:  Current Facility-Administered Medications   Medication Last Admin   • vancomycin (VANCOCIN) 2,000 mg in  mL IVPB Stopped at 06/16/21 2109   • acetaminophen (Tylenol) tablet 650 mg 650 mg at 06/16/21 1713   • ondansetron (ZOFRAN ODT) dispertab 4 mg 4 mg at 06/16/21 1714   • nystatin (MYCOSTATIN) powder Given at 06/17/21 0436   • hydrocortisone (CORTEF) tablet 10 mg 10 mg at 06/17/21 0436   • SUMAtriptan (IMITREX) tablet 25 mg     • famotidine (PEPCID) tablet 20 mg 20 mg at 06/17/21 0435   • NS infusion New Bag at 06/17/21 1128   • aspirin (ASA) chewable tab 81 mg 81 mg at 06/17/21 0435   • calcitonin (salmon) (MIACALCIN) nasal spray 200 Units 200 Units at 06/16/21 1939   • cetirizine (ZYRTEC) tablet 20 mg 20 mg at 06/17/21 0436   • vitamin D (cholecalciferol) tablet 2,000 Units 2,000 Units at 06/17/21 0435   • [START ON 6/19/2021] cyanocobalamin (VITAMIN B-12) injection 1,000 mcg     • apixaban (ELIQUIS) tablet 5 mg 5 mg at 06/17/21 0436   • DULoxetine (CYMBALTA) capsule 60 mg 60 mg at 06/16/21 1934   • levothyroxine (SYNTHROID) tablet 75 mcg 75 mcg at 06/16/21 1714   • liothyronine (CYTOMEL) tablet 25 mcg 25 mcg at 06/16/21 1934   • montelukast (SINGULAIR) tablet 10 mg 10 mg at 06/16/21 1714   • MD Alert...Vancomycin per Pharmacy     • cromolyn (GASTROCROM) 100 MG/5ML oral solution 200 mg 200 mg at 06/17/21  1121     Medical Decision Making, by Problem:  Active Hospital Problems    Diagnosis    • Abnormal nuclear stress test [R94.39]    • Elevated troponin [R77.8]    • Adrenal insufficiency ([place]'s disease) ([facility]) [E27.1]    • Pulmonary embolism ([facility]) [I26.99]    • Acute pulmonary embolism (HCC) [I26.99]    • Acute kidney injury ([facility]) [N17.9]    • Hypothyroidism [E03.9]    • Aortic root dilatation ([facility]) [I77.810]    • Gastroesophageal reflux disease without esophagitis [K21.9]    • Chronic systolic heart failure ([facility]) [I50.22]    • Syncope [R55]      1.  Recurrent MRSA sinusitis, on eravacycline since 06/09.  2.  Previous MRSA and pseudomonas sinusitis completing meropenem and   dalbavancin on 05/31.  3.  Previous MRSA and Klebsiella sinusitis completing therapy on 03/24.  4.  Orthostatic hypotension.  5.  Syncope due to orthostatic and adrenal insufficiency.  6.  Left lower lobe pulmonary embolism, on Eliquis.  7.  Common variable immunodeficiency.  8.  Fibromyalgia.  9.  Seizure disorder.  10.  Schatzki's ring.  11.  History of Takotsubo cardiomyopathy.  12.  Congestive heart failure, chronic.  13.  Adrenal insufficiency, on hydrocortisone.  14.  Multiple drug allergies as above.    Plan:   A 55-year-old female presents with syncope x6.  She was found   to be hypotensive.  I suspect this is due to her underlying adrenal   insufficiency.  She recently had her hydrocortisone decreased from 15 mg to 5   mg.  This is likely the cause of her syncope.     Hospitalist have tried to contact Dr Quiñonez.  No return call as of this am.  She has tried to stand up, but has not ambulated far.  No syncopal episodes in the hospital.   Would like to see her ambulate with assistance before DC is considered.    In terms of her sinusitis,  we will continue with current therapy.  There is no   eravacycline at [facility]. We will continue with vancomycin.     Continue vancomycin per pharmacy while in the hospital.  4-week target date is  07/07 when she is   ready for discharge, we will resume eravacycline via [facility].  Dose adjustment of hydrocortisone per hospitalist..     Pharmacy is dosing vancomycin.  Continue to follow blood cultures.  No evidence of bacteremia or line   infection at this time.

## 2021-06-17 NOTE — DOCUMENTATION QUERY
"                                                                         [facility]                                                                       Query Response Note      PATIENT:               CARMINA MORAN  ACCT #:                  [id number]  MRN:                     [id number]  :                      1965  ADMIT DATE:       6/15/2021 1:20 PM  DISCH DATE:          RESPONDING  PROVIDER #:        [id number]           QUERY TEXT:    Severe malnutrition is documented in the Registered Dietician denise.      Please specify if you:    NOTE:  If an appropriate response is not listed below, please respond with a new note.      The patient's Clinical Indicators include:   Dietary Consult: Severe acute disease related malnutrition AEB 7.5% unintentional weight loss in last 7 weeks accompanied w/ suspected oral intake < 50% of estimated needs in the last two weeks.  Treatment: Dietary consult; encourage po; monitor wt.; nutrition rep  Risk Factors: Acute illness; poor appetite and intake r/t nausea     Thank You,  Nighat Holland RN  Clinical    Connect via [facility]  Options provided:   -- Agree with dietician assessment of severe malnutrition   -- Disagree with dietician assessment of severe malnutrition   -- Unable to determine      Query created by: Nighat Holland on 2021 10:23 AM    RESPONSE TEXT:    Agree with dietician assessment of severe malnutrition       QUERY TEXT:    There is conflicting documentation related to the acuity of pulmonary embolism (PE), \"History of PE\" and \"acute pulmonary embolism\" are both documented in the H&P and subsequent Progress Note.     Can the acuity be further clarified.    NOTE:  If an appropriate response is not listed below, please respond with a new note.    The patient's Clinical Indicators include:  ED: On Eliquis w/ history of PE   H&P &  HM PN: Acute PE, mgmt. Eliquis. Hx of PE.   ID Consult: LLL pulmonary embolism, on " Eliquis.  Treatment: Eliquis; lab testing  Risk Factors: Chronic HFrEF; AAA; obesity    Thank You,  Nighat Holland RN  Clinical    Connect via [employer]  Options provided:   -- Acute pulmonary embolism   -- Chronic pulmonary embolism   -- Pulmonary embolism is historical only   -- Unable to determine      Query created by: Nighat Holland on 6/17/2021 10:28 AM    RESPONSE TEXT:    Chronic pulmonary embolism          Electronically signed by:  GUY SU MD 6/17/2021 10:35 AM

## 2021-06-17 NOTE — FACE TO FACE
Face to Face Supporting Documentation - Home Health    The encounter with this patient was in whole or in part the primary reason for home health admission.    Date of encounter:   Patient:                    MRN:                       YOB: 2022  Donna Isaac  [id number]  1965     Home health to see patient for:  Skilled Nursing care for assessment, interventions & education, Home health aide, Physical Therapy evaluation and treatment and Occupational therapy evaluation and treatment    Skilled need for:  Exacerbation of Chronic Disease State Adrenal insufficiency and New Onset Medical Diagnosis Syncope/fall    Skilled nursing interventions to include:  Comment: Continued home physical and occupational therapy    Homebound status evidenced by:  Need the aid of supportive devices such as crutches, canes, wheelchairs or walkers or Needs the assistance of another person in order to leave the home. Leaving home requires a considerable and taxing effort. There is a normal inability to leave the home.    Community Physician to provide follow up care: Madisyn Mccullough M.D.     Optional Interventions? No      I certify the face to face encounter for this home health care referral meets the CMS requirements and the encounter/clinical assessment with the patient was, in whole, or in part, for the medical condition(s) listed above, which is the primary reason for home health care. Based on my clinical findings: the service(s) are medically necessary, support the need for home health care, and the homebound criteria are met.  I certify that this patient has had a face to face encounter by myself.  David Fajardo M.D. - NPI: [id number]

## 2021-06-17 NOTE — PROGRESS NOTES
Hospital Medicine Daily Progress Note    Date of Service  6/17/2021    Chief Complaint  55 y.o. female admitted 6/15/2021 with syncope    Hospital Course  Per Dr. Lim' HPI:   55 y.o. F with hx of PE on eliquis, known AAA, adrenal insufficiency on Cortef and recent admission to [facility] for Sepsis from recurrent sinusitis status post Merrem and Eravacycline for recurrent sinusitis, presented to the ER 6/15/2021 with chief complaint of syncopal . Patient reported 6 episodes of syncope since yesterday (6/14). Patient hit her head 2 times during the syncopal episode: 11 PM yesterday (6/14) and 6 AM today (6/15).  Patient also reported new onset of epigastric abdominal pain rating sharp pain 6 x 10 in severity spread to the left upper quadrant.     In the ER, patient was found to have hypotension with SBP in 70s, respiratory rate of 22, heart rate of 58, patient EKG showed T wave inversion in anterior leads which is similar to her prior presentation, troponin was 30.  Patient had recent nuclear medicine study from 5/31/2021 was negative for any ischemia.     Pt was started on Vancomycin for previous MRSA infection. Pt was subsequently admitted also to monitor syncope.     6/16 on the unit: Vitals wnl; afebrile.  BP is labile. She did have another syncopal episode early on the unit. Tachycardia resolved. Pain reported - 5-7 in left abdomen  At bedside, reported LUQ pain. Current findings discussed with normal abd CT and essentially normal UA. ID consult appreciated - C/w vancomycin while inpatient.     I tried contacting Pt's endocrinologist Dr. Alber Jeff' office and left a voicemail to call back. Hydrocortisone dosing changed to 10mg twice daily. TSH low but normal Free T4 range.     Interval Problem Update  6/17  Vitals reviewed; afebrile.  The rest of the vitals within normal parameters. BP still labile but orthostatic BP negative. Pt has not been really moving around.  Pain scale reported - none this  AM  I/O - decent urine output     Tele rhythm reviewed: SR with no ectopy    At bedside, reported doing better. Clinically appeared way more energetic as well. Current plan with changes in hydrocortisone dosing discussed. Plan to have her walk around today and possible DC 6/18 AM discussed. Home health services to resume and Pt's outpatient IV Abx infusion will need to be resumed. Pt reported having a good support at home.     Labs reviewed   AM cortisol 6.1      Consultants/Specialty  Lynnette ID     Code Status  Full Code    Disposition  Home with home health when medically cleared (planned for 6/18 AM)    Discussed with patient, patient's nurse and with multidisciplinary team during rounds including , pharmacist and charge nurse.      I have performed the physical examination, and reviewed updated ROS and plan today 6/17/2021.  In review of yesterday's note, there are no new changes except as documented above or bolded below.    Review of Systems  Review of Systems   Constitutional: Negative for chills, fever and malaise/fatigue.   HENT: Negative for congestion, sore throat and tinnitus.    Eyes: Negative for blurred vision and double vision.   Respiratory: Negative for cough, sputum production and shortness of breath.    Cardiovascular: Negative for chest pain, orthopnea and leg swelling.   Gastrointestinal: Positive for abdominal pain and nausea. Negative for heartburn and vomiting.   Genitourinary: Negative for dysuria, frequency and urgency.   Musculoskeletal: Positive for falls. Negative for myalgias and neck pain.   Neurological: Positive for dizziness. Negative for focal weakness and headaches.   Psychiatric/Behavioral: Negative for depression.        Physical Exam  Temp:  [36 °C (96.8 °F)-36.4 °C (97.5 °F)] 36.3 °C (97.3 °F)  Pulse:  [68-88] 85  Resp:  [16-17] 17  BP: ()/(65-98) 121/79  SpO2:  [92 %-97 %] 94 %    Physical Exam  Vitals and nursing note reviewed.   Constitutional:        General: She is not in acute distress.     Appearance: Normal appearance. She is obese. She is not ill-appearing.   HENT:      Head: Normocephalic and atraumatic.      Mouth/Throat:      Mouth: Mucous membranes are moist.      Pharynx: Oropharynx is clear.   Eyes:      General: No scleral icterus.     Conjunctiva/sclera: Conjunctivae normal.   Cardiovascular:      Rate and Rhythm: Normal rate and regular rhythm.      Pulses: Normal pulses.      Heart sounds: Normal heart sounds.   Pulmonary:      Effort: Pulmonary effort is normal. No respiratory distress.      Breath sounds: Normal breath sounds. No wheezing.   Abdominal:      General: Bowel sounds are normal. There is no distension.      Palpations: Abdomen is soft.      Tenderness: There is no abdominal tenderness.   Musculoskeletal:         General: No swelling or tenderness. Normal range of motion.   Skin:     General: Skin is warm and dry.      Capillary Refill: Capillary refill takes less than 2 seconds.      Coloration: Skin is pale.      Findings: Bruising present.   Neurological:      General: No focal deficit present.      Mental Status: She is alert and oriented to person, place, and time. Mental status is at baseline.   Psychiatric:         Mood and Affect: Mood normal.         Fluids    Intake/Output Summary (Last 24 hours) at 6/17/2021 1241  Last data filed at 6/17/2021 1100  Gross per 24 hour   Intake 250 ml   Output 700 ml   Net -450 ml       Laboratory  Recent Labs     06/15/21  1351 06/16/21  0305   WBC 6.8 6.5   RBC 4.08* 4.00*   HEMOGLOBIN 12.3 12.3   HEMATOCRIT 41.0 41.0   .5* 102.5*   MCH 30.1 30.8   MCHC 30.0* 30.0*   RDW 63.4* 65.9*   PLATELETCT 390 300   MPV 10.4 10.4     Recent Labs     06/15/21  1351 06/16/21  0305   SODIUM 139 139   POTASSIUM 4.6 4.5   CHLORIDE 110 111   CO2 19* 18*   GLUCOSE 119* 100*   BUN 31* 20   CREATININE 1.18 0.71   CALCIUM 8.4* 8.1*     Recent Labs     06/15/21  1851   INR 1.60*                Imaging  DX-CHEST-PORTABLE (1 VIEW)   Final Result         1. No acute cardiopulmonary abnormalities are identified.      CT-HEAD W/O   Final Result      1.  No acute intracranial abnormality is identified.   2.  Atrophy   3.  There are periventricular and subcortical white matter changes present.  This finding is nonspecific and could be from previous small vessel ischemia, demyelination, or gliosis.   4.  Paranasal sinus disease.   5.  Trace fluid in the mastoid air cells bilaterally.      CT-RENAL COLIC EVALUATION(A/P W/O)   Final Result         1. No urinary tract calculus identified. No renal collecting system dilatation.      2. No evidence of inflammatory change in the abdomen or pelvis.  The study is however limited due to nonuse of intravenous contrast      3. No abdominal aortic aneurysm.           Assessment/Plan  Adrenal insufficiency ([place]'s disease) (HCC)- (present on admission)  Assessment & Plan  Based on the history of syncope and recent changes in dosing upon last discharge from [facility], this could be the reason for syncope  Pt has been dealing with syncope for awhile  Was previously on Prednisone as well   Increased Cortef 10 mg twice daily - plan for DC with this dosing  Cortisol level in AM wnl    I tried contacting Pt's endocrinologist Dr. Alber Jeff' office and left a voicemail to call back.            Abnormal nuclear stress test- (present on admission)  Assessment & Plan  Patient had nuclear stress test on previous admission, was unremarkable  Today her troponin 30, follow-up serial troponin  EKG with T wave inversions similar to previous admission no acute changes  Continue with aspirin 81 mg daily  Cardiology consult as outpatient    Pulmonary embolism (HCC)- (present on admission)  Assessment & Plan  Continue with Eliquis 5 mg twice daily    Acute pulmonary embolism (HCC)- (present on admission)  Assessment & Plan  Management Eliquis 5 mg twice daily  Pt is following vascular  surgery outpatient    Acute kidney injury (HCC)  Assessment & Plan  Likely in the setting of sepsis versus dehydration  Avoid nephrotoxic medications, NSAIDs  Continue with IV fluid normal saline at 75 cc/h  Strict I&O    Responding to IVF    Hypothyroidism- (present on admission)  Assessment & Plan  C/w home dosing    Syncope- (present on admission)  Assessment & Plan  Secondary to sepsis versus dehydration versus orthostatic etiology  CT head unremarkable  Continue telemetry monitoring  TSH low but free T4 within normal range  Pulmonary embolus unlikely since patient already anticoagulated  Doubt cardiac etiology     Recent hydrocortisone adjustment may be the culprit; reportedly she has been struggling with this syncope for awhile.   Has increased hydrocortisone to 10mg bid.    I tried contacting Pt's endocrinologist Dr. Alber Jeff' office and left a voicemail to call back.      6/17: Orthostatic negative; PT/OT eval appreciated. Pt has been encouraged to ambulate with assistance.         Aortic root dilatation (HCC)- (present on admission)  Assessment & Plan  Patient had a recent CT during previous admission which showed abdominal aortic aneurysm 3 mm  Today she denies any chest pain, but admits to abdominal pain  Right upper extremity blood pressure under 112/56 in the left forearm and 105/55 in right forearm  Chest x-ray stable cardiopulmonary status   Unlikely to be acute aortic dissection      Chronic systolic heart failure (HCC)- (present on admission)  Assessment & Plan  We will hold Coreg 25 mg twice daily, Lasix 40 mg daily in view of hypotension  Resume as tolerated for blood pressure    Gastroesophageal reflux disease without esophagitis- (present on admission)  Assessment & Plan  Continue with Pepcid twice daily       VTE prophylaxis: Eliquis

## 2021-06-17 NOTE — DISCHARGE PLANNING
Received Choice form at 1413  Agency/Facility Name: [facility]  Referral sent per Choice form @ [phone]    LSW informed

## 2021-06-17 NOTE — DISCHARGE PLANNING
Anticipated Discharge Disposition: Home with home health and continuation of IV abx.    Action: LSW spoke to Sophia from [facility] and informed her patient to dc tomorrow. Sophia reports patient should be able to continue treatment with them. No new orders are needed.     LSW met with patient at bedside. Patient reports that she had [facility] and would like to resume. Choice given.    Barriers to Discharge: HH acceptance     Plan: LSW to f/u on referral

## 2021-06-18 VITALS
HEART RATE: 65 BPM | HEIGHT: 64 IN | TEMPERATURE: 97.4 F | DIASTOLIC BLOOD PRESSURE: 68 MMHG | SYSTOLIC BLOOD PRESSURE: 112 MMHG | RESPIRATION RATE: 18 BRPM | OXYGEN SATURATION: 94 % | BODY MASS INDEX: 36.09 KG/M2 | WEIGHT: 211.42 LBS

## 2021-06-18 LAB
ANION GAP SERPL CALC-SCNC: 8 MMOL/L (ref 7–16)
BUN SERPL-MCNC: 5 MG/DL (ref 8–22)
CALCIUM SERPL-MCNC: 7.5 MG/DL (ref 8.5–10.5)
CHLORIDE SERPL-SCNC: 110 MMOL/L (ref 96–112)
CO2 SERPL-SCNC: 21 MMOL/L (ref 20–33)
CREAT SERPL-MCNC: 0.44 MG/DL (ref 0.5–1.4)
GLUCOSE SERPL-MCNC: 114 MG/DL (ref 65–99)
POTASSIUM SERPL-SCNC: 4.1 MMOL/L (ref 3.6–5.5)
SODIUM SERPL-SCNC: 139 MMOL/L (ref 135–145)

## 2021-06-18 PROCEDURE — 80048 BASIC METABOLIC PNL TOTAL CA: CPT

## 2021-06-18 PROCEDURE — A9270 NON-COVERED ITEM OR SERVICE: HCPCS | Performed by: STUDENT IN AN ORGANIZED HEALTH CARE EDUCATION/TRAINING PROGRAM

## 2021-06-18 PROCEDURE — 700102 HCHG RX REV CODE 250 W/ 637 OVERRIDE(OP): Performed by: STUDENT IN AN ORGANIZED HEALTH CARE EDUCATION/TRAINING PROGRAM

## 2021-06-18 PROCEDURE — 99239 HOSP IP/OBS DSCHRG MGMT >30: CPT | Performed by: STUDENT IN AN ORGANIZED HEALTH CARE EDUCATION/TRAINING PROGRAM

## 2021-06-18 RX ORDER — HYDROCORTISONE 10 MG/1
10 TABLET ORAL 2 TIMES DAILY
Qty: 60 TABLET | Refills: 0 | Status: ON HOLD | OUTPATIENT
Start: 2021-06-18 | End: 2021-07-09

## 2021-06-18 RX ADMIN — ASPIRIN 81 MG: 81 TABLET, CHEWABLE ORAL at 04:02

## 2021-06-18 RX ADMIN — NYSTATIN: 100000 POWDER TOPICAL at 04:03

## 2021-06-18 RX ADMIN — APIXABAN 5 MG: 5 TABLET, FILM COATED ORAL at 04:02

## 2021-06-18 RX ADMIN — CROMOLYN SODIUM 200 MG: 100 SOLUTION, CONCENTRATE ORAL at 07:00

## 2021-06-18 RX ADMIN — CETIRIZINE HYDROCHLORIDE 20 MG: 10 TABLET, FILM COATED ORAL at 04:02

## 2021-06-18 RX ADMIN — FAMOTIDINE 20 MG: 20 TABLET ORAL at 04:02

## 2021-06-18 RX ADMIN — Medication 2000 UNITS: at 04:03

## 2021-06-18 RX ADMIN — HYDROCORTISONE 10 MG: 10 TABLET ORAL at 04:02

## 2021-06-18 NOTE — FLOWSHEET NOTE
Telemetry Shift Summary     Rhythm: ST, SR  HR Range:   Ectopy: rare PVC  Measurements: 0.21/0.05/0.36              Normal Values  Rhythm SR  HR Range    Measurements 0.12-0.20 / 0.06-0.10  / 0.30-0.52

## 2021-06-18 NOTE — FLOWSHEET NOTE
Ambulated with patient in halls with FWW. Patient tolerated well, denies dizziness, SOB or lightheadedness. Returned to room, assisted self back to bed. Call light within reach.

## 2021-06-18 NOTE — CARE PLAN
The patient is Watcher - Medium risk of patient condition declining or worsening    Shift Goals  Clinical Goals: determine cause of syncope; IV abx; ID consult; monitor labs & VS  Patient Goals: rest; abd pain relief; free from syncope      Problem: Pain - Standard  Goal: Alleviation of pain or a reduction in pain to the patient’s comfort goal  Outcome: Progressing     Problem: Knowledge Deficit - Standard  Goal: Patient and family/care givers will demonstrate understanding of plan of care, disease process/condition, diagnostic tests and medications  Outcome: Progressing     Problem: Hemodynamics  Goal: Patient's hemodynamics, fluid balance and neurologic status will be stable or improve  Outcome: Progressing     Problem: Urinary - Renal Perfusion  Goal: Ability to achieve and maintain adequate renal perfusion and functioning will improve  Outcome: Progressing

## 2021-06-18 NOTE — PROGRESS NOTES
Pt given discharge instructions, pt and  verbalize understanding. Per MD, pt will be discharging with PICC line in place for outpt antibiotics. Pt and  educated on care of PICC/signs and symptoms of infection. Pt escorted to vehicle to discharge with . All belongings with pt.

## 2021-06-18 NOTE — PROGRESS NOTES
Assumed care of pt. Bedside report received from Jayshree ROBERTSON. Pt was updated on plan of care. Call light, phone and personal belongings in reach. Bed alarm on and working properly, bed in lowest position, and locked.

## 2021-06-18 NOTE — PROGRESS NOTES
Assumed care of pt, received bedside report from AILYN Loaiza. Pt sitting up in bed, no complaints of pain, room air, A/Ox4. Fall and safety precautions in place, strip alarm in place. Discussed POC with pt, pt verbalizes understanding. No further needs at this time.

## 2021-06-18 NOTE — DISCHARGE INSTRUCTIONS
Discharge Instructions    Discharged to home by car with relative. Discharged via wheelchair, hospital escort: Yes.  Special equipment needed: Not Applicable    Be sure to schedule a follow-up appointment with your primary care doctor or any specialists as instructed.     Discharge Plan:   Diet Plan: Discussed  Activity Level: Discussed  Confirmed Follow up Appointment: Appointment Scheduled  Confirmed Symptoms Management: Discussed  Medication Reconciliation Updated: Yes    I understand that a diet low in cholesterol, fat, and sodium is recommended for good health. Unless I have been given specific instructions below for another diet, I accept this instruction as my diet prescription.   Other diet: Regular    Special Instructions:   Sepsis, Diagnosis, Adult  Sepsis is a serious bodily reaction to an infection. The infection that triggers sepsis may be from a bacteria, virus, or fungus. Sepsis can result from an infection in any part of your body. Infections that commonly lead to sepsis include skin, lung, and urinary tract infections.  Sepsis is a medical emergency that must be treated right away in a hospital. In severe cases, it can lead to septic shock. Septic shock can weaken your heart and cause your blood pressure to drop. This can cause your central nervous system and your body's organs to stop working.  What are the causes?  This condition is caused by a severe reaction to infections from bacteria, viruses, or fungus. The germs that most often lead to sepsis include:  · Escherichia coli (E. coli) bacteria.  · Staphylococcus aureus (staph) bacteria.  · Some types of Streptococcus bacteria.  The most common infections affect these organs:  · The lung (pneumonia).  · The kidneys or bladder (urinary tract infection).  · The skin (cellulitis).  · The bowel, gallbladder, or pancreas.  What increases the risk?  You are more likely to develop this condition if:  · Your body's disease-fighting system (immune system)  is weakened.  · You are age 65 or older.  · You are male.  · You had surgery or you have been hospitalized.  · You have these devices inserted into your body:  ? A small, thin tube (catheter).  ? IV line.  ? Breathing tube.  ? Drainage tube.  · You are not getting enough nutrients from food (malnourished).  · You have a long-term (chronic) disease, such as cancer, lung disease, kidney disease, or diabetes.  · You are .  What are the signs or symptoms?  Symptoms of this condition may include:  · Fever.  · Chills or feeling very cold.  · Confusion or anxiety.  · Fatigue.  · Muscle aches.  · Shortness of breath.  · Nausea and vomiting.  · Urinating much less than usual.  · Fast heart rate (tachycardia).  · Rapid breathing (hyperventilation).  · Changes in skin color. Your skin may look blotchy, pale, or blue.  · Cool, clammy, or sweaty skin.  · Skin rash.  Other symptoms depend on the source of your infection.  How is this diagnosed?  This condition is diagnosed based on:  · Your symptoms.  · Your medical history.  · A physical exam.  Other tests may also be done to find out the cause of the infection and how severe the sepsis is. These tests may include:  · Blood tests.  · Urine tests.  · Swabs from other areas of your body that may have an infection. These samples may be tested (cultured) to find out what type of bacteria is causing the infection.  · Chest X-ray to check for pneumonia. Other imaging tests, such as a CT scan, may also be done.  · Lumbar puncture. This removes a small amount of the fluid that surrounds your brain and spinal cord. The fluid is then examined for infection.  How is this treated?  This condition must be treated in a hospital. Based on the cause of your infection, you may be given an antibiotic, antiviral, or antifungal medicine.  You may also receive:  · Fluids through an IV.  · Oxygen and breathing assistance.  · Medicines to increase your blood pressure.  · Kidney  dialysis. This process cleans your blood if your kidneys have failed.  · Surgery to remove infected tissue.  · Blood transfusion if needed.  · Medicine to prevent blood clots.  · Nutrients to correct imbalances in basic body function (metabolism). You may:  ? Receive important salts and minerals (electrolytes) through an IV.  ? Have your blood sugar level adjusted.  Follow these instructions at home:  Medicines    · Take over-the-counter and prescription medicines only as told by your health care provider.  · If you were prescribed an antibiotic, antiviral, or antifungal medicine, take it as told by your health care provider. Do not stop taking the medicine even if you start to feel better.  General instructions  · If you have a catheter or other indwelling device, ask to have it removed as soon as possible.  · Keep all follow-up visits as told by your health care provider. This is important.  Contact a health care provider if:  · You do not feel like you are getting better or regaining strength.  · You are having trouble coping with your recovery.  · You frequently feel tired.  · You feel worse or do not seem to get better after surgery.  · You think you may have an infection after surgery.  Get help right away if:  · You have any symptoms of sepsis.  · You have difficulty breathing.  · You have a rapid or skipping heartbeat.  · You become confused or disoriented.  · You have a high fever.  · Your skin becomes blotchy, pale, or blue.  · You have an infection that is getting worse or not getting better.  These symptoms may represent a serious problem that is an emergency. Do not wait to see if the symptoms will go away. Get medical help right away. Call your local emergency services (911 in the [place] Do not drive yourself to the hospital.  Summary  · Sepsis is a medical emergency that requires immediate treatment in a hospital.  · This condition is caused by a severe reaction to infections from bacteria, viruses,  or fungus.  · Based on the cause of your infection, you may be given an antibiotic, antiviral, or antifungal medicine.  · Treatment may also include IV fluids, breathing assistance, and kidney dialysis.  This information is not intended to replace advice given to you by your health care provider. Make sure you discuss any questions you have with your health care provider.  Document Released: 09/15/2004 Document Revised: 07/26/2019 Document Reviewed: 07/26/2019  [employer] Patient Education © 2020 [employer] Inc.      · Is patient discharged on Warfarin / Coumadin?   No     Furosemide tablets  What is this medicine?  FUROSEMIDE (fyoor [place] se mide) is a diuretic. It helps you make more urine and to lose salt and excess water from your body. This medicine is used to treat high blood pressure, and edema or swelling from heart, kidney, or liver disease.  This medicine may be used for other purposes; ask your health care provider or pharmacist if you have questions.  COMMON BRAND NAME(S): Active-Medicated Specimen Kit, Delone, Diuscreen, Lasix, RX Specimen Collection Kit, Specimen Collection Kit, URINX Medicated Specimen Collection  What should I tell my health care provider before I take this medicine?  They need to know if you have any of these conditions:  · abnormal blood electrolytes  · diarrhea or vomiting  · gout  · heart disease  · kidney disease, small amounts of urine, or difficulty passing urine  · liver disease  · thyroid disease  · an unusual or allergic reaction to furosemide, sulfa drugs, other medicines, foods, dyes, or preservatives  · pregnant or trying to get pregnant  · breast-feeding  How should I use this medicine?  Take this medicine by mouth with a glass of water. Follow the directions on the prescription label. You may take this medicine with or without food. If it upsets your stomach, take it with food or milk. Do not take your medicine more often than directed. Remember that you will need to pass  more urine after taking this medicine. Do not take your medicine at a time of day that will cause you problems. Do not take at bedtime.  Talk to your pediatrician regarding the use of this medicine in children. While this drug may be prescribed for selected conditions, precautions do apply.  Overdosage: If you think you have taken too much of this medicine contact a poison control center or emergency room at once.  NOTE: This medicine is only for you. Do not share this medicine with others.  What if I miss a dose?  If you miss a dose, take it as soon as you can. If it is almost time for your next dose, take only that dose. Do not take double or extra doses.  What may interact with this medicine?  · aspirin and aspirin-like medicines  · certain antibiotics  · chloral hydrate  · cisplatin  · cyclosporine  · digoxin  · diuretics  · laxatives  · lithium  · medicines for blood pressure  · medicines that relax muscles for surgery  · methotrexate  · NSAIDs, medicines for pain and inflammation like ibuprofen, naproxen, or indomethacin  · phenytoin  · steroid medicines like prednisone or cortisone  · sucralfate  · thyroid hormones  This list may not describe all possible interactions. Give your health care provider a list of all the medicines, herbs, non-prescription drugs, or dietary supplements you use. Also tell them if you smoke, drink alcohol, or use illegal drugs. Some items may interact with your medicine.  What should I watch for while using this medicine?  Visit your doctor or health care provider for regular checks on your progress. Check your blood pressure regularly. Ask your doctor or health care provider what your blood pressure should be, and when you should contact him or her. If you are a diabetic, check your blood sugar as directed.  This medicine may cause serious skin reactions. They can happen weeks to months after starting the medicine. Contact your health care provider right away if you notice fevers  or flu-like symptoms with a rash. The rash may be red or purple and then turn into blisters or peeling of the skin. Or, you might notice a red rash with swelling of the face, lips or lymph nodes in your neck or under your arms.  You may need to be on a special diet while taking this medicine. Check with your doctor. Also, ask how many glasses of fluid you need to drink a day. You must not get dehydrated.  You may get drowsy or dizzy. Do not drive, use machinery, or do anything that needs mental alertness until you know how this drug affects you. Do not stand or sit up quickly, especially if you are an older patient. This reduces the risk of dizzy or fainting spells. Alcohol can make you more drowsy and dizzy. Avoid alcoholic drinks.  This medicine can make you more sensitive to the sun. Keep out of the sun. If you cannot avoid being in the sun, wear protective clothing and use sunscreen. Do not use sun lamps or tanning beds/booths.  What side effects may I notice from receiving this medicine?  Side effects that you should report to your doctor or health care professional as soon as possible:  · blood in urine or stools  · dry mouth  · fever or chills  · hearing loss or ringing in the ears  · irregular heartbeat  · muscle pain or weakness, cramps  · rash, fever, and swollen lymph nodes  · redness, blistering, peeling or loosening of the skin, including inside the mouth  · skin rash  · stomach upset, pain, or nausea  · tingling or numbness in the hands or feet  · unusually weak or tired  · vomiting or diarrhea  · yellowing of the eyes or skin  Side effects that usually do not require medical attention (report to your doctor or health care professional if they continue or are bothersome):  · headache  · loss of appetite  · unusual bleeding or bruising  This list may not describe all possible side effects. Call your doctor for medical advice about side effects. You may report side effects to FDA at  1-800-FDA-1088.  Where should I keep my medicine?  Keep out of the reach of children.  Store at room temperature between 15 and 30 degrees C (59 and 86 degrees F). Protect from light. Throw away any unused medicine after the expiration date.  NOTE: This sheet is a summary. It may not cover all possible information. If you have questions about this medicine, talk to your doctor, pharmacist, or health care provider.  © 2020 Elsevier/Gold Standard (2020-03-20 14:04:13)      Hydrocortisone tablets  What is this medicine?  HYDROCORTISONE (cinthya droe KOR ti sone) is a corticosteroid. It is commonly used to treat inflammation of the skin, joints, lungs, and other organs. Common conditions treated include asthma, allergies, and arthritis. It is also used for other conditions, like blood disorders and diseases of the adrenal glands.  This medicine may be used for other purposes; ask your health care provider or pharmacist if you have questions.  COMMON BRAND NAME(S): Cortef, Hydrocortone  What should I tell my health care provider before I take this medicine?  They need to know if you have any of these conditions:  · Cushing's syndrome  · diabetes  · glaucoma  · heart problems or disease  · high blood pressure  · infection like herpes, measles, tuberculosis, or chickenpox  · kidney disease  · liver disease  · mental problems  · myasthenia gravis  · osteoporosis  · previous heart attack  · seizures  · stomach, ulcer or intestine disease including colitis and diverticulitis  · thyroid problem  · an unusual or allergic reaction to hydrocortisone, corticosteroids, other medicines, lactose, foods, dyes, or preservatives  · pregnant or trying to get pregnant  · breast-feeding  How should I use this medicine?  Take this medicine by mouth with a drink of water. Follow the directions on the prescription label. Take it with food or milk to avoid stomach upset. If you are taking this medicine once a day, take it in the morning. Do not  take more medicine than you are told to take. Do not suddenly stop taking your medicine because you may develop a severe reaction. Your doctor will tell you how much medicine to take. If your doctor wants you to stop the medicine, the dose may be slowly lowered over time to avoid any side effects.  Talk to your pediatrician regarding the use of this medicine in children. Special care may be needed.  Overdosage: If you think you have taken too much of this medicine contact a poison control center or emergency room at once.  NOTE: This medicine is only for you. Do not share this medicine with others.  What if I miss a dose?  If you miss a dose, take it as soon as you can. If it is almost time for your next dose, take only that dose. Do not take double or extra doses.  What may interact with this medicine?  Do not take this medicine with any of the following medications:  · mifepristone, RU-486  · vaccines  This medicine may also interact with the following medications:  · antibiotics like clarithromycin, erythromycin, and troleandomycin  · aspirin and aspirin-like drugs  · barbiturates like phenobarbital  · ketoconazole  · phenytoin  · rifampin  · warfarin  This list may not describe all possible interactions. Give your health care provider a list of all the medicines, herbs, non-prescription drugs, or dietary supplements you use. Also tell them if you smoke, drink alcohol, or use illegal drugs. Some items may interact with your medicine.  What should I watch for while using this medicine?  Visit your doctor or health care professional for regular checks on your progress. If you are taking this medicine over a prolonged period, carry an identification card with your name and address, the type and dose of your medicine, and your doctor's name and address.  This medicine may increase your risk of getting an infection. Stay away from people who are sick. Tell your doctor or health care professional if you are around  anyone with measles or chickenpox.  If you are going to have surgery, tell your doctor or health care professional that you have taken this medicine within the last twelve months.  Ask your doctor or health care professional about your diet. You may need to lower the amount of salt you eat.  This medicine may increase blood sugar. Ask your healthcare provider if changes in diet or medicines are needed if you have diabetes.  What side effects may I notice from receiving this medicine?  Side effects that you should report to your doctor or health care professional as soon as possible:  · fever, sore throat, sneezing, cough, or other signs of infection, wounds that will not heal  · mental depression, mood swings, mistaken feelings of self importance or of being mistreated  · pain in hips, back, ribs, arms, shoulders, or legs  · severe stomach pain  ·   signs and symptoms of high blood sugar such as being more thirsty or hungry or having to urinate more than normal. You may also feel very tired or have blurry vision.  · swelling of feet or lower legs  · vomiting  Side effects that usually do not require medical attention (report to your doctor or health care professional if they continue or are bothersome):  · headache  · nausea  · skin problems, acne, thin and shiny skin  This list may not describe all possible side effects. Call your doctor for medical advice about side effects. You may report side effects to FDA at 5-174-FDA-2793.  Where should I keep my medicine?  Keep out of the reach of children.  Store at room temperature between 20 and 25 degrees C (68 and 77 degrees F). Throw away any unused medicine after the expiration date.  NOTE: This sheet is a summary. It may not cover all possible information. If you have questions about this medicine, talk to your doctor, pharmacist, or health care provider.  © 2020 [employer]vier/Gold Standard (2019-09-18 12:14:04)      Depression / Suicide Risk    As you are discharged from  this [facility] facility, it is important to learn how to keep safe from harming yourself.    Recognize the warning signs:  · Abrupt changes in personality, positive or negative- including increase in energy   · Giving away possessions  · Change in eating patterns- significant weight changes-  positive or negative  · Change in sleeping patterns- unable to sleep or sleeping all the time   · Unwillingness or inability to communicate  · Depression  · Unusual sadness, discouragement and loneliness  · Talk of wanting to die  · Neglect of personal appearance   · Rebelliousness- reckless behavior  · Withdrawal from people/activities they love  · Confusion- inability to concentrate     If you or a loved one observes any of these behaviors or has concerns about self-harm, here's what you can do:  · Talk about it- your feelings and reasons for harming yourself  · Remove any means that you might use to hurt yourself (examples: pills, rope, extension cords, firearm)  · Get professional help from the community (Mental Health, Substance Abuse, psychological counseling)  · Do not be alone:Call your Safe Contact- someone whom you trust who will be there for you.  · Call your local CRISIS HOTLINE 100-7363 or 196-733-1212  · Call your local Children's Mobile Crisis Response Team [place] (072) 380-2108 or www.NuPathe  · Call the toll free National Suicide Prevention Hotlines   · National Suicide Prevention Lifeline 678-621-HIJR (8656)  · National Hope Line Network 800-SUICIDE (951-2249)

## 2021-06-18 NOTE — DISCHARGE PLANNING
Anticipated Discharge Disposition: home with [facility] and infusions    Action: AILYN SOTO spoke with Freda at [facility] and they have accepted patient back on service. AILYN SOTO notified Freda that patient will be discharging home today.    Barriers to Discharge: none    Plan: Patient to discharge home today

## 2021-06-19 NOTE — DISCHARGE SUMMARY
Discharge Summary    CHIEF COMPLAINT ON ADMISSION  Chief Complaint   Patient presents with   • Syncope     x 6 times since yesterday. Last night at 2300 and this morning at 0600 the patient states she hit the back of her head during the fall. The patient takes Eliquis for a PE   • T-5000 Head Injury     posterior head   • Chest Pain     history of AAA 3cm that is not currently being treated.       Reason for Admission  Syncope    Admission Date  6/15/2021    CODE STATUS  FULL    HPI & HOSPITAL COURSE  Per Dr. Lim' HPI:   55 y.o. F with hx of PE on eliquis, known AAA, adrenal insufficiency on Cortef and recent admission to [facility] for Sepsis from recurrent sinusitis status post Merrem and Eravacycline for recurrent sinusitis, presented to the ER 6/15/2021 with chief complaint of syncopal . Patient reported 6 episodes of syncope since yesterday (6/14). Patient hit her head 2 times during the syncopal episode: 11 PM yesterday (6/14) and 6 AM today (6/15).  Patient also reported new onset of epigastric abdominal pain rating sharp pain 6 x 10 in severity spread to the left upper quadrant.     In the ER, patient was found to have hypotension with SBP in 70s, respiratory rate of 22, heart rate of 58, patient EKG showed T wave inversion in anterior leads which is similar to her prior presentation, troponin was 30.  Patient had recent nuclear medicine study from 5/31/2021 was negative for any ischemia.      Pt was started on Vancomycin for previous MRSA infection. Pt was subsequently admitted also to monitor syncope.      6/16 on the unit: Vitals wnl; afebrile.  BP is labile. She did have another syncopal episode early on the unit. Tachycardia resolved. Pain reported - 5-7 in left abdomen  At bedside, reported LUQ pain. Current findings discussed with normal abd CT and essentially normal UA. ID consult appreciated - C/w vancomycin while inpatient.     I tried contacting Pt's endocrinologist Dr. Alber Jeff'  office and left a voicemail to call back. Hydrocortisone dosing changed to 10mg twice daily. TSH low but normal Free T4 range.      6/17: BP still labile but orthostatic BP negative. Pt has not been really moving around. I/O - decent urine output   Tele rhythm reviewed: SR with no ectopy     At bedside, clinically appeared way more energetic as well. Current plan with changes in hydrocortisone dosing discussed. Plan to have her walk around today and possible DC 6/18 AM discussed. Home health services to resume and Pt's outpatient IV Abx infusion will need to be resumed. Pt reported having a good support at home. AM cortisol 6.1    6/18: Stable vitals with BP within normal parameters. DC plan discussed and advised to follow up with endocrinology and neurology.        Therefore, she is discharged in fair and stable condition to home with close outpatient follow-up.    The patient met 2-midnight criteria for an inpatient stay at the time of discharge.    Discharge Date  6/18/2021    FOLLOW UP ITEMS POST DISCHARGE  Endocrinology    Physical Exam  Vitals and nursing note reviewed.   Constitutional:       General: She is not in acute distress.     Appearance: Normal appearance. She is obese. She is not ill-appearing.   HENT:      Head: Normocephalic and atraumatic.      Mouth/Throat:      Mouth: Mucous membranes are moist.      Pharynx: Oropharynx is clear.   Eyes:      General: No scleral icterus.     Conjunctiva/sclera: Conjunctivae normal.   Cardiovascular:      Rate and Rhythm: Normal rate and regular rhythm.      Pulses: Normal pulses.      Heart sounds: Normal heart sounds.   Pulmonary:      Effort: Pulmonary effort is normal. No respiratory distress.      Breath sounds: Normal breath sounds. No wheezing.   Abdominal:      General: Bowel sounds are normal. There is no distension.      Palpations: Abdomen is soft.      Tenderness: There is no abdominal tenderness.   Musculoskeletal:         General: No swelling or  tenderness. Normal range of motion.   Skin:     General: Skin is warm and dry.      Capillary Refill: Capillary refill takes less than 2 seconds.      Coloration: Skin is pale.      Findings: Bruising present.   Neurological:      General: No focal deficit present.      Mental Status: She is alert and oriented to person, place, and time. Mental status is at baseline.   Psychiatric:         Mood and Affect: Mood normal.     DISCHARGE DIAGNOSES  Principal Problem:    Syncope POA: Yes      Overview: Overview:       Likely due to hypotension  Active Problems:    Adrenal insufficiency ([place]'s disease) (HCC) POA: Yes    Gastroesophageal reflux disease without esophagitis POA: Yes    Chronic migraine without aura, not intractable POA: Yes    Chronic systolic heart failure (HCC) POA: Yes    Aortic root dilatation ([facility]) POA: Yes    CKD (chronic kidney disease) stage 3, GFR 30-59 ml/min ([facility]) POA: Yes    Hypothyroidism POA: Yes    Acute kidney injury (HCC) POA: Yes    Acute pulmonary embolism ([facility]) POA: Yes    Pulmonary embolism ([facility]) POA: Yes    Recurrent sinusitis POA: Yes    Elevated troponin POA: Yes    Abnormal nuclear stress test POA: Yes  Resolved Problems:    * No resolved hospital problems. *      FOLLOW UP  Future Appointments   Date Time Provider Department Center   7/15/2021  9:40 AM KENNEDY Fraser None   9/13/2021  8:15 AM [facility]   9/20/2021  8:00 AM Eligio Torres M.D. [facility] None   9/21/2021 11:20 AM Michael J Bloch, M.D. [facility] None   10/12/2021  9:00 AM KENNEDY Fraser None     Madisyn Mccullough M.D.  [address] [address]  [phone]            MEDICATIONS ON DISCHARGE     Medication List      CHANGE how you take these medications      Instructions   amitriptyline 10 MG Tabs  What changed: See the new instructions.  Commonly known as: ELAVIL   TAKE 2 TABLETS BY MOUTH EVERY NIGHT AT BEDTIME, AND 1 TABLET BY MOUTH EVERY MORNING      furosemide 20 MG Tabs  What changed: Another medication with the same name was removed. Continue taking this medication, and follow the directions you see here.  Commonly known as: LASIX   Take 20 mg by mouth every day.  Dose: 20 mg     hydrocortisone 10 MG Tabs  What changed:   · medication strength  · how much to take  · when to take this  Commonly known as: CORTEF   Take 1 tablet by mouth 2 times a day.  Dose: 10 mg        CONTINUE taking these medications      Instructions   AMILoride 5 MG Tabs  Commonly known as: MIDAMOR   Take 5 mg by mouth every day.  Dose: 5 mg     aspirin 81 MG Chew chewable tablet  Commonly known as: ASA   Chew 1 tablet every day.  Dose: 81 mg     calcitonin (salmon) 200 UNIT/ACT Soln  Commonly known as: MIACALCIN   Spray 1 Spray in nose every bedtime.  Dose: 1 Spray     carvedilol 25 MG Tabs  Commonly known as: COREG   Take 25 mg by mouth 2 times a day with meals.  Dose: 25 mg     cetirizine 10 MG Tabs  Commonly known as: ZYRTEC   Take 20 mg by mouth 2 (two) times a day. 20mg = 2 tabs  Dose: 20 mg     colesevelam 625 MG Tabs  Commonly known as: WELCHOL   Take 625 mg by mouth 2 times a day with meals.  Dose: 625 mg     cromolyn 100 MG/5ML solution  Commonly known as: GASTROCROM   Take 200 mg by mouth 3 times a day before meals.  Dose: 200 mg     cyanocobalamin 1000 MCG/ML Soln  Commonly known as: VITAMIN B-12   Inject 1,000 mcg into the shoulder, thigh, or buttocks every Saturday.  Dose: 1,000 mcg     Dexilant 60 MG Cpdr delayed-release capsule  Generic drug: Dexlansoprazole   Take 60 mg by mouth every evening.  Dose: 60 mg     DULoxetine 60 MG Cpep delayed-release capsule  Commonly known as: CYMBALTA   Take 60 mg by mouth every bedtime.  Dose: 60 mg     Eliquis 5mg Tabs  Generic drug: apixaban   Take 5 mg by mouth 2 times a day.  Dose: 5 mg     Erenumab 70 MG/ML Soaj  Commonly known as: AIMOVIG   Inject 140 mg under the skin Q30 DAYS.  Dose: 140 mg     estradiol 0.5 MG tablet  Commonly  known as: ESTRACE   Take 0.5 mg by mouth every morning.  Dose: 0.5 mg     famotidine 20 MG Tabs  Commonly known as: PEPCID   Take 40 mg by mouth 2 times a day.  Dose: 40 mg     Frova 2.5 MG Tabs  Generic drug: Frovatriptan Succinate   Take 2.5 mg by mouth as needed (For migraines). MR x 1 in 1 hour if no relief  then must wait 8 hours for another dose  Dose: 2.5 mg     gabapentin 400 MG Caps  Commonly known as: NEURONTIN   Take 1 Cap by mouth 3 times a day.  Dose: 400 mg     Jardiance 10 MG Tabs  Generic drug: Empagliflozin   Take 10 mg by mouth every bedtime.  Dose: 10 mg     levalbuterol 45 MCG/ACT inhaler  Commonly known as: XOPENEX HFA   Inhale 1-2 Puffs every four hours as needed for Shortness of Breath.  Dose: 1-2 Puff     levothyroxine 75 MCG Tabs  Commonly known as: SYNTHROID   Take 75 mcg by mouth every evening.  Dose: 75 mcg     liothyronine 25 MCG Tabs  Commonly known as: CYTOMEL   Take 25 mcg by mouth every bedtime.  Dose: 25 mcg     Magnesium 400 MG Tabs   Take 400 mg by mouth every evening.  Dose: 400 mg     meloxicam 15 MG tablet  Commonly known as: MOBIC   Take 15 mg by mouth at bedtime. FOR PAIN  Dose: 15 mg     montelukast 10 MG Tabs  Commonly known as: SINGULAIR   Take 10 mg by mouth every evening.  Dose: 10 mg     multivitamin Tabs   Take 1 Tab by mouth every day.  Dose: 1 tablet     tizanidine 4 MG Tabs  Commonly known as: ZANAFLEX   Take 4 mg by mouth 1 time a day as needed for Muscle Spasms.  Dose: 4 mg     Vitamin D3 2000 UNIT Caps   Take 2,000 Units by mouth every day.  Dose: 2,000 Units     Zomacton 10 MG Solr  Generic drug: Somatropin   Inject 0.3 mg under the skin every bedtime.  Dose: 0.3 mg            Allergies  Allergies   Allergen Reactions   • Ancef [Cefazolin] Hives and Shortness of Breath   • Bactrim [Sulfamethoxazole W-Trimethoprim] Shortness of Breath   • Bee Venom Anaphylaxis   • Buprenorphine Anaphylaxis   • Buprenorphine Hives and Shortness of Breath   • Clindamycin Hives and  "Shortness of Breath   • Contrast Media With Iodine [Iodine] Hives and Swelling   • Doxycycline Hives and Shortness of Breath   • Econazole Anaphylaxis   • Econazole Nitrate [Ecoza] Anaphylaxis   • Flagyl  [Metronidazole] Hives and Unspecified   • Floxin Otic Anaphylaxis   • Floxin [Ofloxacin] Anaphylaxis, Shortness of Breath and Swelling     Cause throat swelling and difficulty breathing   • Gadolinium Derivatives Hives and Swelling     Throat swelling   • Hydrocodone-Acetaminophen Hives and Shortness of Breath   • Iodine Shortness of Breath and Anaphylaxis     Throat and tongue swelling with IV contrast   • Keflex Shortness of Breath   • Keflex Hives and Shortness of Breath   • Levaquin Shortness of Breath     anaphlyaxis   • Levaquin Anaphylaxis   • Levofloxacin Shortness of Breath   • Morphine Anaphylaxis   • Naloxone Hives     \"hives, SOB\"   • Naloxone Hives and Shortness of Breath   • Nitrofurantoin Shortness of Breath     ...and hives     • Nitrofurantoin Hives and Shortness of Breath   • Norco [Apap-Fd&C Yellow #10 Al Tai-Hydrocodone] Shortness of Breath     ...and hives     • Nyquil Hives and Shortness of Breath   • Oxycodone Shortness of Breath     ...and hives     • Oxycodone Hcl Hives and Shortness of Breath   • Paricalcitol Hives   • Paricalcitol Hives, Shortness of Breath and Unspecified     CHEST PAIN   • Penicillins Shortness of Breath     ...and hives     • Penicillins Hives and Shortness of Breath   • Tape Contact Dermatitis and Swelling     Blisters, clear tegaderm ok.. No steristrips.  Other reaction(s): Other, Unknown   • Tramadol Hives   • Vicks Dayquil Cold Hives and Shortness of Breath   • Ancef [Cefazolin] Hives   • Azithromycin Hives and Shortness of Breath     Pt had Hives also   • Bextra [Valdecoxib] Rash     \"Skin burn and peeling.\"   • Flagyl [Kdc:Metronidazole+Tartrazine] Hives   • Gadolinium Swelling and Hives   • Linezolid Rash     Rash all over body   • Nyquil Hives and Shortness of " "Breath     Other reaction(s): Respiratory Distress   • Other Drug Hives     \"Enconazole nitrate.\" shortness of breath and hives   • Other Misc Unspecified     Adhesive tape: blisters, skin peels off   • Clindamycin      HIVES     • Clindamycin Hcl      Other reaction(s): hives   • Codeine Shortness of Breath and Rash     Rash & SOB   • Iodinated Diagnostic Agents  [Diagnostic X-Ray Materials]    • Sulfa Drugs      Other reaction(s): Hives   • Tramadol      Rash/hives   • Tygacil [Tigecycline]      itching   • Bextra [Valdecoxib] Unspecified     Skin blisters and peels off   • Tape Unspecified     Blisters and peels off       DIET  Regular     ACTIVITY  As tolerated.  Weight bearing as tolerated    CONSULTATIONS  N/A    PROCEDURES  N/A    LABORATORY  Lab Results   Component Value Date    SODIUM 139 06/18/2021    POTASSIUM 4.1 06/18/2021    CHLORIDE 110 06/18/2021    CO2 21 06/18/2021    GLUCOSE 114 (H) 06/18/2021    BUN 5 (L) 06/18/2021    CREATININE 0.44 (L) 06/18/2021        Lab Results   Component Value Date    WBC 6.5 06/16/2021    HEMOGLOBIN 12.3 06/16/2021    HEMATOCRIT 41.0 06/16/2021    PLATELETCT 300 06/16/2021        Total time of the discharge process exceeds 34minutes.  "

## 2021-06-22 ENCOUNTER — APPOINTMENT (OUTPATIENT)
Dept: RADIOLOGY | Facility: MEDICAL CENTER | Age: 56
DRG: 312 | End: 2021-06-22
Attending: EMERGENCY MEDICINE
Payer: MEDICARE

## 2021-06-22 ENCOUNTER — HOSPITAL ENCOUNTER (INPATIENT)
Facility: MEDICAL CENTER | Age: 56
LOS: 6 days | DRG: 312 | End: 2021-06-28
Attending: EMERGENCY MEDICINE | Admitting: HOSPITALIST
Payer: MEDICARE

## 2021-06-22 DIAGNOSIS — I95.1 ORTHOSTATIC SYNCOPE: ICD-10-CM

## 2021-06-22 DIAGNOSIS — I95.9 HYPOTENSION, UNSPECIFIED HYPOTENSION TYPE: ICD-10-CM

## 2021-06-22 DIAGNOSIS — I95.1 ORTHOSTATIC HYPOTENSION: ICD-10-CM

## 2021-06-22 DIAGNOSIS — R00.0 SINUS TACHYCARDIA: ICD-10-CM

## 2021-06-22 PROBLEM — Z86.711 HISTORY OF PULMONARY EMBOLISM: Status: ACTIVE | Noted: 2021-06-22

## 2021-06-22 PROBLEM — A49.02 MRSA (METHICILLIN RESISTANT STAPHYLOCOCCUS AUREUS) INFECTION: Status: ACTIVE | Noted: 2021-06-22

## 2021-06-22 LAB
ALBUMIN SERPL BCP-MCNC: 2.8 G/DL (ref 3.2–4.9)
ALBUMIN/GLOB SERPL: 1.2 G/DL
ALP SERPL-CCNC: 156 U/L (ref 30–99)
ALT SERPL-CCNC: 44 U/L (ref 2–50)
ANION GAP SERPL CALC-SCNC: 13 MMOL/L (ref 7–16)
APPEARANCE UR: CLEAR
AST SERPL-CCNC: 33 U/L (ref 12–45)
BASOPHILS # BLD AUTO: 0.4 % (ref 0–1.8)
BASOPHILS # BLD: 0.03 K/UL (ref 0–0.12)
BILIRUB SERPL-MCNC: 0.5 MG/DL (ref 0.1–1.5)
BILIRUB UR QL STRIP.AUTO: NEGATIVE
BUN SERPL-MCNC: 30 MG/DL (ref 8–22)
CALCIUM SERPL-MCNC: 8.4 MG/DL (ref 8.4–10.2)
CHLORIDE SERPL-SCNC: 102 MMOL/L (ref 96–112)
CO2 SERPL-SCNC: 21 MMOL/L (ref 20–33)
COLOR UR: YELLOW
CREAT SERPL-MCNC: 1.2 MG/DL (ref 0.5–1.4)
EOSINOPHIL # BLD AUTO: 0.01 K/UL (ref 0–0.51)
EOSINOPHIL NFR BLD: 0.1 % (ref 0–6.9)
ERYTHROCYTE [DISTWIDTH] IN BLOOD BY AUTOMATED COUNT: 57.7 FL (ref 35.9–50)
FLUAV RNA SPEC QL NAA+PROBE: NEGATIVE
FLUBV RNA SPEC QL NAA+PROBE: NEGATIVE
GLOBULIN SER CALC-MCNC: 2.4 G/DL (ref 1.9–3.5)
GLUCOSE SERPL-MCNC: 163 MG/DL (ref 65–99)
GLUCOSE UR STRIP.AUTO-MCNC: 250 MG/DL
HCT VFR BLD AUTO: 40.5 % (ref 37–47)
HGB BLD-MCNC: 12.6 G/DL (ref 12–16)
IMM GRANULOCYTES # BLD AUTO: 0.05 K/UL (ref 0–0.11)
IMM GRANULOCYTES NFR BLD AUTO: 0.7 % (ref 0–0.9)
KETONES UR STRIP.AUTO-MCNC: NEGATIVE MG/DL
LACTATE BLD-SCNC: 3.1 MMOL/L (ref 0.5–2)
LACTATE BLD-SCNC: 3.2 MMOL/L (ref 0.5–2)
LEUKOCYTE ESTERASE UR QL STRIP.AUTO: NEGATIVE
LYMPHOCYTES # BLD AUTO: 1.29 K/UL (ref 1–4.8)
LYMPHOCYTES NFR BLD: 17.3 % (ref 22–41)
MCH RBC QN AUTO: 30.5 PG (ref 27–33)
MCHC RBC AUTO-ENTMCNC: 31.1 G/DL (ref 33.6–35)
MCV RBC AUTO: 98.1 FL (ref 81.4–97.8)
MICRO URNS: ABNORMAL
MONOCYTES # BLD AUTO: 0.99 K/UL (ref 0–0.85)
MONOCYTES NFR BLD AUTO: 13.3 % (ref 0–13.4)
NEUTROPHILS # BLD AUTO: 5.08 K/UL (ref 2–7.15)
NEUTROPHILS NFR BLD: 68.2 % (ref 44–72)
NITRITE UR QL STRIP.AUTO: NEGATIVE
NRBC # BLD AUTO: 0 K/UL
NRBC BLD-RTO: 0 /100 WBC
PH UR STRIP.AUTO: 6 [PH] (ref 5–8)
PLATELET # BLD AUTO: 306 K/UL (ref 164–446)
PMV BLD AUTO: 10.9 FL (ref 9–12.9)
POTASSIUM SERPL-SCNC: 4.3 MMOL/L (ref 3.6–5.5)
PROT SERPL-MCNC: 5.2 G/DL (ref 6–8.2)
PROT UR QL STRIP: NEGATIVE MG/DL
RBC # BLD AUTO: 4.13 M/UL (ref 4.2–5.4)
RBC UR QL AUTO: NEGATIVE
RSV RNA SPEC QL NAA+PROBE: NEGATIVE
SARS-COV-2 RNA RESP QL NAA+PROBE: NOTDETECTED
SODIUM SERPL-SCNC: 136 MMOL/L (ref 135–145)
SP GR UR STRIP.AUTO: 1.01
SPECIMEN SOURCE: NORMAL
WBC # BLD AUTO: 7.5 K/UL (ref 4.8–10.8)

## 2021-06-22 PROCEDURE — 99223 1ST HOSP IP/OBS HIGH 75: CPT | Performed by: HOSPITALIST

## 2021-06-22 PROCEDURE — 87086 URINE CULTURE/COLONY COUNT: CPT

## 2021-06-22 PROCEDURE — 71045 X-RAY EXAM CHEST 1 VIEW: CPT

## 2021-06-22 PROCEDURE — 93005 ELECTROCARDIOGRAM TRACING: CPT | Performed by: HOSPITALIST

## 2021-06-22 PROCEDURE — 82533 TOTAL CORTISOL: CPT

## 2021-06-22 PROCEDURE — 36415 COLL VENOUS BLD VENIPUNCTURE: CPT

## 2021-06-22 PROCEDURE — 0241U HCHG SARS-COV-2 COVID-19 NFCT DS RESP RNA 4 TRGT MIC: CPT

## 2021-06-22 PROCEDURE — 96375 TX/PRO/DX INJ NEW DRUG ADDON: CPT

## 2021-06-22 PROCEDURE — 81003 URINALYSIS AUTO W/O SCOPE: CPT

## 2021-06-22 PROCEDURE — A9270 NON-COVERED ITEM OR SERVICE: HCPCS | Performed by: HOSPITALIST

## 2021-06-22 PROCEDURE — 80053 COMPREHEN METABOLIC PANEL: CPT

## 2021-06-22 PROCEDURE — 700105 HCHG RX REV CODE 258: Performed by: HOSPITALIST

## 2021-06-22 PROCEDURE — 700111 HCHG RX REV CODE 636 W/ 250 OVERRIDE (IP): Performed by: EMERGENCY MEDICINE

## 2021-06-22 PROCEDURE — C9803 HOPD COVID-19 SPEC COLLECT: HCPCS | Performed by: EMERGENCY MEDICINE

## 2021-06-22 PROCEDURE — 83605 ASSAY OF LACTIC ACID: CPT | Mod: 91

## 2021-06-22 PROCEDURE — 85025 COMPLETE CBC W/AUTO DIFF WBC: CPT

## 2021-06-22 PROCEDURE — 700105 HCHG RX REV CODE 258: Performed by: EMERGENCY MEDICINE

## 2021-06-22 PROCEDURE — 96366 THER/PROPH/DIAG IV INF ADDON: CPT

## 2021-06-22 PROCEDURE — 700102 HCHG RX REV CODE 250 W/ 637 OVERRIDE(OP): Performed by: HOSPITALIST

## 2021-06-22 PROCEDURE — 96365 THER/PROPH/DIAG IV INF INIT: CPT

## 2021-06-22 PROCEDURE — 770020 HCHG ROOM/CARE - TELE (206)

## 2021-06-22 PROCEDURE — 99285 EMERGENCY DEPT VISIT HI MDM: CPT

## 2021-06-22 RX ORDER — FAMOTIDINE 20 MG/1
20 TABLET, FILM COATED ORAL
Status: DISCONTINUED | OUTPATIENT
Start: 2021-06-22 | End: 2021-06-22

## 2021-06-22 RX ORDER — HYDROCORTISONE 10 MG/1
10 TABLET ORAL 2 TIMES DAILY
Status: SHIPPED | COMMUNITY
End: 2021-06-22

## 2021-06-22 RX ORDER — VITAMIN B COMPLEX
2000 TABLET ORAL DAILY
Status: DISCONTINUED | OUTPATIENT
Start: 2021-06-22 | End: 2021-06-28 | Stop reason: HOSPADM

## 2021-06-22 RX ORDER — SODIUM CHLORIDE 9 MG/ML
INJECTION, SOLUTION INTRAVENOUS CONTINUOUS
Status: DISCONTINUED | OUTPATIENT
Start: 2021-06-22 | End: 2021-06-22

## 2021-06-22 RX ORDER — COLESEVELAM 180 1/1
625 TABLET ORAL 3 TIMES DAILY
Status: DISCONTINUED | OUTPATIENT
Start: 2021-06-22 | End: 2021-06-28 | Stop reason: HOSPADM

## 2021-06-22 RX ORDER — LOSARTAN POTASSIUM 100 MG/1
100 TABLET ORAL DAILY
Status: ON HOLD | COMMUNITY
End: 2021-06-26

## 2021-06-22 RX ORDER — HYDROCORTISONE 10 MG/1
10 TABLET ORAL 2 TIMES DAILY
Status: DISCONTINUED | OUTPATIENT
Start: 2021-06-22 | End: 2021-06-28 | Stop reason: HOSPADM

## 2021-06-22 RX ORDER — LOSARTAN POTASSIUM 25 MG/1
25 TABLET ORAL
Status: DISCONTINUED | OUTPATIENT
Start: 2021-06-22 | End: 2021-06-28 | Stop reason: HOSPADM

## 2021-06-22 RX ORDER — TIZANIDINE 4 MG/1
4 TABLET ORAL
Status: DISCONTINUED | OUTPATIENT
Start: 2021-06-22 | End: 2021-06-28 | Stop reason: HOSPADM

## 2021-06-22 RX ORDER — LIOTHYRONINE SODIUM 5 UG/1
25 TABLET ORAL
Status: DISCONTINUED | OUTPATIENT
Start: 2021-06-22 | End: 2021-06-28 | Stop reason: HOSPADM

## 2021-06-22 RX ORDER — CALCITONIN SALMON 200 [IU]/.09ML
1 SPRAY, METERED NASAL
Status: DISCONTINUED | OUTPATIENT
Start: 2021-06-22 | End: 2021-06-28 | Stop reason: HOSPADM

## 2021-06-22 RX ORDER — ONDANSETRON 2 MG/ML
4 INJECTION INTRAMUSCULAR; INTRAVENOUS EVERY 4 HOURS PRN
Status: DISCONTINUED | OUTPATIENT
Start: 2021-06-22 | End: 2021-06-28 | Stop reason: HOSPADM

## 2021-06-22 RX ORDER — LEVOTHYROXINE SODIUM 0.07 MG/1
75 TABLET ORAL
COMMUNITY

## 2021-06-22 RX ORDER — POTASSIUM CHLORIDE 20 MEQ/1
20 TABLET, EXTENDED RELEASE ORAL DAILY
Status: ON HOLD | COMMUNITY
End: 2021-06-26

## 2021-06-22 RX ORDER — MAGNESIUM OXIDE 400 MG/1
400 TABLET ORAL DAILY
Status: SHIPPED | COMMUNITY
End: 2021-06-22

## 2021-06-22 RX ORDER — SODIUM CHLORIDE, SODIUM LACTATE, POTASSIUM CHLORIDE, AND CALCIUM CHLORIDE .6; .31; .03; .02 G/100ML; G/100ML; G/100ML; G/100ML
30 INJECTION, SOLUTION INTRAVENOUS ONCE
Status: COMPLETED | OUTPATIENT
Start: 2021-06-22 | End: 2021-06-22

## 2021-06-22 RX ORDER — SODIUM CHLORIDE 9 MG/ML
1000 INJECTION, SOLUTION INTRAVENOUS CONTINUOUS
Status: DISCONTINUED | OUTPATIENT
Start: 2021-06-22 | End: 2021-06-25

## 2021-06-22 RX ORDER — PANTOPRAZOLE SODIUM 40 MG/1
40 TABLET, DELAYED RELEASE ORAL DAILY
Status: ON HOLD | COMMUNITY
End: 2021-06-26

## 2021-06-22 RX ORDER — CETIRIZINE HYDROCHLORIDE 10 MG/1
20 TABLET ORAL 2 TIMES DAILY
Status: ON HOLD | COMMUNITY
End: 2021-07-09

## 2021-06-22 RX ORDER — GABAPENTIN 400 MG/1
400 CAPSULE ORAL 2 TIMES DAILY
Status: DISCONTINUED | OUTPATIENT
Start: 2021-06-22 | End: 2021-06-28 | Stop reason: HOSPADM

## 2021-06-22 RX ORDER — CHOLECALCIFEROL (VITAMIN D3) 50 MCG
2000 TABLET ORAL DAILY
COMMUNITY
End: 2021-10-25 | Stop reason: SDUPTHER

## 2021-06-22 RX ORDER — BISACODYL 10 MG
10 SUPPOSITORY, RECTAL RECTAL
Status: DISCONTINUED | OUTPATIENT
Start: 2021-06-22 | End: 2021-06-28 | Stop reason: HOSPADM

## 2021-06-22 RX ORDER — DULOXETIN HYDROCHLORIDE 30 MG/1
60 CAPSULE, DELAYED RELEASE ORAL
Status: DISCONTINUED | OUTPATIENT
Start: 2021-06-22 | End: 2021-06-28 | Stop reason: HOSPADM

## 2021-06-22 RX ORDER — DOXAZOSIN 2 MG/1
2 TABLET ORAL DAILY
Status: DISCONTINUED | OUTPATIENT
Start: 2021-06-23 | End: 2021-06-23

## 2021-06-22 RX ORDER — OMEPRAZOLE 20 MG/1
20 CAPSULE, DELAYED RELEASE ORAL DAILY
Status: DISCONTINUED | OUTPATIENT
Start: 2021-06-23 | End: 2021-06-28 | Stop reason: HOSPADM

## 2021-06-22 RX ORDER — ASPIRIN 81 MG/1
81 TABLET, CHEWABLE ORAL DAILY
Status: DISCONTINUED | OUTPATIENT
Start: 2021-06-22 | End: 2021-06-22

## 2021-06-22 RX ORDER — SODIUM CHLORIDE 9 MG/ML
1000 INJECTION, SOLUTION INTRAVENOUS ONCE
Status: COMPLETED | OUTPATIENT
Start: 2021-06-22 | End: 2021-06-22

## 2021-06-22 RX ORDER — MONTELUKAST SODIUM 10 MG/1
10 TABLET ORAL EVERY EVENING
Status: DISCONTINUED | OUTPATIENT
Start: 2021-06-22 | End: 2021-06-28 | Stop reason: HOSPADM

## 2021-06-22 RX ORDER — ONDANSETRON 4 MG/1
4 TABLET, ORALLY DISINTEGRATING ORAL EVERY 4 HOURS PRN
Status: DISCONTINUED | OUTPATIENT
Start: 2021-06-22 | End: 2021-06-28 | Stop reason: HOSPADM

## 2021-06-22 RX ORDER — DOXAZOSIN 2 MG/1
2 TABLET ORAL DAILY
Status: ON HOLD | COMMUNITY
End: 2021-06-26

## 2021-06-22 RX ORDER — SUMATRIPTAN 25 MG/1
50 TABLET, FILM COATED ORAL PRN
Status: DISCONTINUED | OUTPATIENT
Start: 2021-06-22 | End: 2021-06-28 | Stop reason: HOSPADM

## 2021-06-22 RX ORDER — ERAVACYCLINE 100 MG/1
100 INJECTION, POWDER, LYOPHILIZED, FOR SOLUTION INTRAVENOUS 2 TIMES DAILY
COMMUNITY
Start: 2021-07-04 | End: 2021-08-03

## 2021-06-22 RX ORDER — POLYETHYLENE GLYCOL 3350 17 G/17G
1 POWDER, FOR SOLUTION ORAL
Status: DISCONTINUED | OUTPATIENT
Start: 2021-06-22 | End: 2021-06-28 | Stop reason: HOSPADM

## 2021-06-22 RX ORDER — OMEPRAZOLE 20 MG/1
40 CAPSULE, DELAYED RELEASE ORAL EVERY EVENING
Status: DISCONTINUED | OUTPATIENT
Start: 2021-06-22 | End: 2021-06-22

## 2021-06-22 RX ORDER — LEVOTHYROXINE SODIUM 0.07 MG/1
75 TABLET ORAL
Status: DISCONTINUED | OUTPATIENT
Start: 2021-06-23 | End: 2021-06-28 | Stop reason: HOSPADM

## 2021-06-22 RX ORDER — PROMETHAZINE HYDROCHLORIDE 25 MG/1
12.5-25 SUPPOSITORY RECTAL EVERY 4 HOURS PRN
Status: DISCONTINUED | OUTPATIENT
Start: 2021-06-22 | End: 2021-06-28 | Stop reason: HOSPADM

## 2021-06-22 RX ORDER — AMITRIPTYLINE HYDROCHLORIDE 10 MG/1
10 TABLET, FILM COATED ORAL DAILY
Status: DISCONTINUED | OUTPATIENT
Start: 2021-06-23 | End: 2021-06-28 | Stop reason: HOSPADM

## 2021-06-22 RX ORDER — CARVEDILOL 3.12 MG/1
3.12 TABLET ORAL 2 TIMES DAILY WITH MEALS
Status: DISCONTINUED | OUTPATIENT
Start: 2021-06-22 | End: 2021-06-28 | Stop reason: HOSPADM

## 2021-06-22 RX ADMIN — Medication 2000 UNITS: at 21:12

## 2021-06-22 RX ADMIN — SODIUM CHLORIDE 1000 ML: 9 INJECTION, SOLUTION INTRAVENOUS at 20:32

## 2021-06-22 RX ADMIN — HYDROCORTISONE 10 MG: 10 TABLET ORAL at 20:23

## 2021-06-22 RX ADMIN — LIOTHYRONINE SODIUM 25 MCG: 5 TABLET ORAL at 21:13

## 2021-06-22 RX ADMIN — APIXABAN 5 MG: 5 TABLET, FILM COATED ORAL at 21:11

## 2021-06-22 RX ADMIN — VANCOMYCIN HYDROCHLORIDE 2500 MG: 500 INJECTION, POWDER, LYOPHILIZED, FOR SOLUTION INTRAVENOUS at 16:21

## 2021-06-22 RX ADMIN — SODIUM CHLORIDE 1000 ML: 9 INJECTION, SOLUTION INTRAVENOUS at 13:30

## 2021-06-22 RX ADMIN — GABAPENTIN 400 MG: 400 CAPSULE ORAL at 20:23

## 2021-06-22 RX ADMIN — HYDROCORTISONE SODIUM SUCCINATE 100 MG: 100 INJECTION, POWDER, FOR SOLUTION INTRAMUSCULAR; INTRAVENOUS at 13:19

## 2021-06-22 RX ADMIN — SODIUM CHLORIDE, POTASSIUM CHLORIDE, SODIUM LACTATE AND CALCIUM CHLORIDE 2910 ML: 600; 310; 30; 20 INJECTION, SOLUTION INTRAVENOUS at 15:15

## 2021-06-22 RX ADMIN — MONTELUKAST 10 MG: 10 TABLET, FILM COATED ORAL at 21:11

## 2021-06-22 RX ADMIN — CALCITONIN SALMON 200 UNITS: 200 SPRAY, METERED NASAL at 21:17

## 2021-06-22 RX ADMIN — COLESEVELAM HYDROCHLORIDE 625 MG: 625 TABLET, FILM COATED ORAL at 20:21

## 2021-06-22 RX ADMIN — DULOXETINE HYDROCHLORIDE 60 MG: 30 CAPSULE, DELAYED RELEASE ORAL at 20:23

## 2021-06-22 ASSESSMENT — COGNITIVE AND FUNCTIONAL STATUS - GENERAL
TOILETING: TOTAL
SUGGESTED CMS G CODE MODIFIER MOBILITY: CK
DRESSING REGULAR LOWER BODY CLOTHING: A LOT
DRESSING REGULAR UPPER BODY CLOTHING: A LITTLE
HELP NEEDED FOR BATHING: A LOT
WALKING IN HOSPITAL ROOM: A LITTLE
TURNING FROM BACK TO SIDE WHILE IN FLAT BAD: A LITTLE
SUGGESTED CMS G CODE MODIFIER DAILY ACTIVITY: CK
DAILY ACTIVITIY SCORE: 16
MOVING TO AND FROM BED TO CHAIR: A LITTLE
STANDING UP FROM CHAIR USING ARMS: A LOT
MOVING FROM LYING ON BACK TO SITTING ON SIDE OF FLAT BED: A LITTLE
MOBILITY SCORE: 17
CLIMB 3 TO 5 STEPS WITH RAILING: A LITTLE

## 2021-06-22 ASSESSMENT — FIBROSIS 4 INDEX
FIB4 SCORE: 0.91
FIB4 SCORE: 1.73

## 2021-06-22 ASSESSMENT — ENCOUNTER SYMPTOMS
SHORTNESS OF BREATH: 0
MYALGIAS: 0
EYE REDNESS: 0
STRIDOR: 0
COUGH: 0
FOCAL WEAKNESS: 0
BRUISES/BLEEDS EASILY: 0
EYE DISCHARGE: 0
ABDOMINAL PAIN: 0
LOSS OF CONSCIOUSNESS: 1
CHILLS: 0
NERVOUS/ANXIOUS: 0
VOMITING: 0
FEVER: 0
FLANK PAIN: 0

## 2021-06-22 ASSESSMENT — PAIN DESCRIPTION - PAIN TYPE: TYPE: ACUTE PAIN

## 2021-06-22 NOTE — ED NOTES
Pt had assisted syncopal episode after using bedside commode. This RN was with pt during time of episode. No injury, ERP aware. Pt to not get out of bed. time.
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
## 2021-06-22 NOTE — ED NOTES
Med rec updated and complete  Allergies reviewed  Pt had a list of medications at bedside, went over pts medications and returned pts medications back to pts chart  Pt will have someone bring in her XERAVA      No current facility-administered medications on file prior to encounter.     Current Outpatient Medications on File Prior to Encounter   Medication Sig Dispense Refill   • cetirizine (KLS ALLER-HAWA) 10 MG Tab Take 20 mg by mouth 2 times a day.     • doxazosin (CARDURA) 2 MG Tab Take 2 mg by mouth every day.     • losartan (COZAAR) 100 MG Tab Take 100 mg by mouth every day.     • pantoprazole (PROTONIX) 40 MG Tablet Delayed Response Take 40 mg by mouth every day.     • potassium chloride SA (KDUR) 20 MEQ Tab CR Take 20 mEq by mouth every day.     • levothyroxine (SYNTHROID) 75 MCG Tab Take 75 mcg by mouth every morning on an empty stomach.     • BIOTIN PO Take 500 mcg by mouth every day.     • CALCIUM-MAGNESIUM-ZINC PO Take 1 tablet by mouth every day.     • Cholecalciferol (D3) 2000 UNIT Tab Take 2,000 Units by mouth every day.     • multivitamin (THERAGRAN) Tab Take 1 tablet by mouth every day.     • Probiotic Product (PROBIOTIC DAILY PO) Take 1 capsule by mouth every day.     • VITAMIN K PO Take 1 tablet by mouth every day.     • XERAVA 100 MG Recon Soln Infuse 100 mg into a venous catheter 2 times a day. Pt started 6/10/2021 for 6 weeks     • hydrocortisone (CORTEF) 10 MG Tab Take 1 tablet by mouth 2 times a day. 60 tablet 0   • ELIQUIS 5 MG Tab Take 5 mg by mouth 2 times a day.     • famotidine (PEPCID) 40 MG Tab Take 40 mg by mouth at bedtime.     • furosemide (LASIX) 20 MG Tab Take 20 mg by mouth every day.     • AMILoride (MIDAMOR) 5 MG Tab Take 5 mg by mouth every day.     • meloxicam (MOBIC) 15 MG tablet Take 15 mg by mouth at bedtime. FOR PAIN     • tizanidine (ZANAFLEX) 4 MG Tab Take 4 mg by mouth 1 time a day as needed for Muscle Spasms. Indications: Musculoskeletal Pain     • carvedilol (COREG)  25 MG Tab Take 25 mg by mouth 2 times a day with meals.     • amitriptyline (ELAVIL) 10 MG Tab TAKE 2 TABLETS BY MOUTH EVERY NIGHT AT BEDTIME, AND 1 TABLET BY MOUTH EVERY MORNING (Patient taking differently: Take 10-20 mg by mouth 2 times a day. 20mg every night at bedtime, 10mg every morning.) 90 tablet 3   • Erenumab (AIMOVIG) 70 MG/ML Solution Auto-injector Inject 140 mg under the skin Q30 DAYS.     • cyanocobalamin (VITAMIN B-12) 1000 MCG/ML Solution Inject 1,000 mcg into the shoulder, thigh, or buttocks every 7 days. On Tue     • Dexlansoprazole (DEXILANT) 60 MG CAPSULE DELAYED RELEASE delayed-release capsule Take 60 mg by mouth every evening.     • DULoxetine (CYMBALTA) 60 MG Cap DR Particles delayed-release capsule Take 60 mg by mouth every bedtime.     • estradiol (ESTRACE) 0.5 MG tablet Take 0.5 mg by mouth every morning.     • liothyronine (CYTOMEL) 25 MCG Tab Take 25 mcg by mouth every bedtime.     • Somatropin (ZOMACTON) 10 MG Recon Soln Inject 0.3 mg under the skin every evening.     • Magnesium 400 MG Tab Take 400 mg by mouth every day.     • Frovatriptan Succinate (FROVA) 2.5 MG Tab Take 2.5 mg by mouth as needed (For migraines). MR x 1 in 1 hour if no relief  then must wait 8 hours for another dose     • montelukast (SINGULAIR) 10 MG Tab Take 10 mg by mouth every evening.     • calcitonin, salmon, (MIACALCIN) 200 UNIT/ACT Solution Spray 1 Spray in nose every bedtime.  12   • colesevelam (WELCHOL) 625 MG Tab Take 625 mg by mouth in the morning, at noon, and at bedtime.     • gabapentin (NEURONTIN) 400 MG Cap Take 1 Cap by mouth 3 times a day. 180 Cap 3
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
## 2021-06-22 NOTE — ED TRIAGE NOTES
Chief Complaint   Patient presents with   • Syncope     Pt has hx of addisons and hypotension. Pt had 6 assisted syncopal episodes by  today. No trauma, pt is on eliquis for previous dx of PE   • Hypotension     Vitals:    06/22/21 1252   BP: (!) 80/55   Pulse: (!) 55   Resp: 18   Temp: 36.9 °C (98.5 °F)   SpO2: 98%     BG   Pt arrived with PICC to left upper arm

## 2021-06-23 ENCOUNTER — APPOINTMENT (OUTPATIENT)
Dept: RADIOLOGY | Facility: MEDICAL CENTER | Age: 56
DRG: 312 | End: 2021-06-23
Attending: FAMILY MEDICINE
Payer: MEDICARE

## 2021-06-23 LAB
ALBUMIN SERPL BCP-MCNC: 2.4 G/DL (ref 3.2–4.9)
ALBUMIN/GLOB SERPL: 1.1 G/DL
ALP SERPL-CCNC: 136 U/L (ref 30–99)
ALT SERPL-CCNC: 42 U/L (ref 2–50)
ANION GAP SERPL CALC-SCNC: 11 MMOL/L (ref 7–16)
AST SERPL-CCNC: 47 U/L (ref 12–45)
BASOPHILS # BLD AUTO: 0.2 % (ref 0–1.8)
BASOPHILS # BLD: 0.02 K/UL (ref 0–0.12)
BILIRUB SERPL-MCNC: 0.5 MG/DL (ref 0.1–1.5)
BUN SERPL-MCNC: 26 MG/DL (ref 8–22)
CALCIUM SERPL-MCNC: 8 MG/DL (ref 8.4–10.2)
CHLORIDE SERPL-SCNC: 105 MMOL/L (ref 96–112)
CO2 SERPL-SCNC: 21 MMOL/L (ref 20–33)
CORTIS SERPL-MCNC: >63.4 UG/DL (ref 0–23)
CREAT SERPL-MCNC: 0.94 MG/DL (ref 0.5–1.4)
EOSINOPHIL # BLD AUTO: 0.01 K/UL (ref 0–0.51)
EOSINOPHIL NFR BLD: 0.1 % (ref 0–6.9)
ERYTHROCYTE [DISTWIDTH] IN BLOOD BY AUTOMATED COUNT: 56.6 FL (ref 35.9–50)
GLOBULIN SER CALC-MCNC: 2.1 G/DL (ref 1.9–3.5)
GLUCOSE SERPL-MCNC: 126 MG/DL (ref 65–99)
HCT VFR BLD AUTO: 37.4 % (ref 37–47)
HGB BLD-MCNC: 11.7 G/DL (ref 12–16)
IMM GRANULOCYTES # BLD AUTO: 0.06 K/UL (ref 0–0.11)
IMM GRANULOCYTES NFR BLD AUTO: 0.7 % (ref 0–0.9)
LACTATE BLD-SCNC: 2.9 MMOL/L (ref 0.5–2)
LYMPHOCYTES # BLD AUTO: 1.26 K/UL (ref 1–4.8)
LYMPHOCYTES NFR BLD: 13.9 % (ref 22–41)
MAGNESIUM SERPL-MCNC: 1.5 MG/DL (ref 1.5–2.5)
MCH RBC QN AUTO: 30.2 PG (ref 27–33)
MCHC RBC AUTO-ENTMCNC: 31.3 G/DL (ref 33.6–35)
MCV RBC AUTO: 96.6 FL (ref 81.4–97.8)
MONOCYTES # BLD AUTO: 0.92 K/UL (ref 0–0.85)
MONOCYTES NFR BLD AUTO: 10.2 % (ref 0–13.4)
NEUTROPHILS # BLD AUTO: 6.78 K/UL (ref 2–7.15)
NEUTROPHILS NFR BLD: 74.9 % (ref 44–72)
NRBC # BLD AUTO: 0 K/UL
NRBC BLD-RTO: 0 /100 WBC
PLATELET # BLD AUTO: 279 K/UL (ref 164–446)
PMV BLD AUTO: 10.9 FL (ref 9–12.9)
POTASSIUM SERPL-SCNC: 4.8 MMOL/L (ref 3.6–5.5)
PROT SERPL-MCNC: 4.5 G/DL (ref 6–8.2)
RBC # BLD AUTO: 3.87 M/UL (ref 4.2–5.4)
SODIUM SERPL-SCNC: 137 MMOL/L (ref 135–145)
WBC # BLD AUTO: 9.1 K/UL (ref 4.8–10.8)

## 2021-06-23 PROCEDURE — A9270 NON-COVERED ITEM OR SERVICE: HCPCS | Performed by: HOSPITALIST

## 2021-06-23 PROCEDURE — 770020 HCHG ROOM/CARE - TELE (206)

## 2021-06-23 PROCEDURE — 70450 CT HEAD/BRAIN W/O DYE: CPT | Mod: ME

## 2021-06-23 PROCEDURE — 700105 HCHG RX REV CODE 258: Performed by: FAMILY MEDICINE

## 2021-06-23 PROCEDURE — 85025 COMPLETE CBC W/AUTO DIFF WBC: CPT

## 2021-06-23 PROCEDURE — 83605 ASSAY OF LACTIC ACID: CPT

## 2021-06-23 PROCEDURE — 83735 ASSAY OF MAGNESIUM: CPT

## 2021-06-23 PROCEDURE — 99233 SBSQ HOSP IP/OBS HIGH 50: CPT | Performed by: FAMILY MEDICINE

## 2021-06-23 PROCEDURE — 700102 HCHG RX REV CODE 250 W/ 637 OVERRIDE(OP): Performed by: FAMILY MEDICINE

## 2021-06-23 PROCEDURE — A9270 NON-COVERED ITEM OR SERVICE: HCPCS | Performed by: FAMILY MEDICINE

## 2021-06-23 PROCEDURE — 80053 COMPREHEN METABOLIC PANEL: CPT

## 2021-06-23 PROCEDURE — 700102 HCHG RX REV CODE 250 W/ 637 OVERRIDE(OP): Performed by: HOSPITALIST

## 2021-06-23 RX ORDER — ACETAMINOPHEN 500 MG
1000 TABLET ORAL ONCE
Status: COMPLETED | OUTPATIENT
Start: 2021-06-23 | End: 2021-06-23

## 2021-06-23 RX ORDER — SODIUM CHLORIDE 9 MG/ML
500 INJECTION, SOLUTION INTRAVENOUS ONCE
Status: COMPLETED | OUTPATIENT
Start: 2021-06-23 | End: 2021-06-23

## 2021-06-23 RX ORDER — FLUDROCORTISONE ACETATE 0.1 MG/1
0.1 TABLET ORAL EVERY MORNING
Status: DISCONTINUED | OUTPATIENT
Start: 2021-06-23 | End: 2021-06-28 | Stop reason: HOSPADM

## 2021-06-23 RX ADMIN — Medication 2000 UNITS: at 05:39

## 2021-06-23 RX ADMIN — DULOXETINE HYDROCHLORIDE 60 MG: 30 CAPSULE, DELAYED RELEASE ORAL at 20:30

## 2021-06-23 RX ADMIN — LOSARTAN POTASSIUM 25 MG: 25 TABLET, FILM COATED ORAL at 05:41

## 2021-06-23 RX ADMIN — MONTELUKAST 10 MG: 10 TABLET, FILM COATED ORAL at 17:19

## 2021-06-23 RX ADMIN — SODIUM CHLORIDE 500 ML: 9 INJECTION, SOLUTION INTRAVENOUS at 16:10

## 2021-06-23 RX ADMIN — COLESEVELAM HYDROCHLORIDE 625 MG: 625 TABLET, FILM COATED ORAL at 16:15

## 2021-06-23 RX ADMIN — GABAPENTIN 400 MG: 400 CAPSULE ORAL at 17:19

## 2021-06-23 RX ADMIN — COLESEVELAM HYDROCHLORIDE 625 MG: 625 TABLET, FILM COATED ORAL at 08:16

## 2021-06-23 RX ADMIN — LIOTHYRONINE SODIUM 25 MCG: 5 TABLET ORAL at 20:30

## 2021-06-23 RX ADMIN — GABAPENTIN 400 MG: 400 CAPSULE ORAL at 05:38

## 2021-06-23 RX ADMIN — DOXAZOSIN 2 MG: 2 TABLET ORAL at 05:59

## 2021-06-23 RX ADMIN — LEVOTHYROXINE SODIUM 75 MCG: 0.07 TABLET ORAL at 05:41

## 2021-06-23 RX ADMIN — APIXABAN 5 MG: 5 TABLET, FILM COATED ORAL at 05:39

## 2021-06-23 RX ADMIN — ACETAMINOPHEN 1000 MG: 500 TABLET, FILM COATED ORAL at 16:15

## 2021-06-23 RX ADMIN — CALCITONIN SALMON 200 UNITS: 200 SPRAY, METERED NASAL at 20:31

## 2021-06-23 RX ADMIN — HYDROCORTISONE 10 MG: 10 TABLET ORAL at 05:39

## 2021-06-23 RX ADMIN — AMITRIPTYLINE HYDROCHLORIDE 10 MG: 10 TABLET, FILM COATED ORAL at 05:38

## 2021-06-23 RX ADMIN — FLUDROCORTISONE ACETATE 0.1 MG: 0.1 TABLET ORAL at 16:15

## 2021-06-23 RX ADMIN — HYDROCORTISONE 10 MG: 10 TABLET ORAL at 17:19

## 2021-06-23 RX ADMIN — COLESEVELAM HYDROCHLORIDE 625 MG: 625 TABLET, FILM COATED ORAL at 20:30

## 2021-06-23 RX ADMIN — OMEPRAZOLE 20 MG: 20 CAPSULE, DELAYED RELEASE ORAL at 05:38

## 2021-06-23 RX ADMIN — APIXABAN 5 MG: 5 TABLET, FILM COATED ORAL at 17:19

## 2021-06-23 ASSESSMENT — ENCOUNTER SYMPTOMS
FOCAL WEAKNESS: 0
DIZZINESS: 1
NAUSEA: 0
ABDOMINAL PAIN: 0
CHILLS: 0
FEVER: 0
VOMITING: 0
COUGH: 0
SHORTNESS OF BREATH: 0

## 2021-06-23 ASSESSMENT — LIFESTYLE VARIABLES
CONSUMPTION TOTAL: NEGATIVE
TOTAL SCORE: 0
AVERAGE NUMBER OF DAYS PER WEEK YOU HAVE A DRINK CONTAINING ALCOHOL: 0
EVER FELT BAD OR GUILTY ABOUT YOUR DRINKING: NO
HAVE PEOPLE ANNOYED YOU BY CRITICIZING YOUR DRINKING: NO
HAVE YOU EVER FELT YOU SHOULD CUT DOWN ON YOUR DRINKING: NO
ON A TYPICAL DAY WHEN YOU DRINK ALCOHOL HOW MANY DRINKS DO YOU HAVE: 0
TOTAL SCORE: 0
ALCOHOL_USE: NO
HOW MANY TIMES IN THE PAST YEAR HAVE YOU HAD 5 OR MORE DRINKS IN A DAY: 0
EVER HAD A DRINK FIRST THING IN THE MORNING TO STEADY YOUR NERVES TO GET RID OF A HANGOVER: NO
TOTAL SCORE: 0

## 2021-06-23 ASSESSMENT — PAIN DESCRIPTION - PAIN TYPE: TYPE: ACUTE PAIN

## 2021-06-23 NOTE — ASSESSMENT & PLAN NOTE
Difficult to manage with her orthostasis  Reduced dose of carvedilol and losartan with hold parameters.  We will hold home furosemide, doxazosin and amiloride at this point

## 2021-06-23 NOTE — ASSESSMENT & PLAN NOTE
Resumed home levothyroxine - a little overcorrected TSH on last labs, but free T4 was normal, will have pt recheck with her PCP in three weeks

## 2021-06-23 NOTE — PROGRESS NOTES
Telemetry shift summary:  Rhythm: SR, SB     HR Range: 50s to 70s, down to 46      Measurements from strip printed at 03:07:05   ME: 0.20   QRS 0.10   QT 0.44     Ectopies: rare PVC.

## 2021-06-23 NOTE — PROGRESS NOTES
Bedside report received from Fernando ROBERTSON and assumed Pt's cares. Call light within reach. Safety measures in place.

## 2021-06-23 NOTE — ASSESSMENT & PLAN NOTE
- Significantly multifactorial - patient has been admitted numerous times with orthostasis and syncope.  I think she most likely has an autonomic disorder contributing to her orthostasis, that is worsened by her known adrenal insufficiency and need for ARBs/BB due to a history of systolic heart failure  - Her goal will likely be to utilize the lowest dose of Coreg and Losartan that will maintain a good cardiac output for her while minimizing orthostasis  - Avoid volume depletion - she is on 40mg of Lasix daily but has a recovered EF - she likely does not need such a large dose. Will consider 20mg daily or PRN for peripheral edema at discharge   - Dr Pryor at [facility] specializes in autonomic disorders - pt already sees an NP there for migraines, we will try to get her an appt with him to evaluate for autonomic dysfunction at discharge. If not, there is an autonomic disorder specialist at [place] that we can try to get her in with  - In the meantime, added Florinef to her hydrocortisone to add some mineralocorticoid activity.   - Added an abdominal binder and SILVERIO hose with good effect   - Looking at getting pt to rehab

## 2021-06-23 NOTE — PROGRESS NOTES
4 Eyes Skin Assessment Completed by AILYN Covington and AILYN Bunch.    Head WDL  Ears WDL  Nose WDL  Mouth WDL  Neck WDL  Breast/Chest WDL  Shoulder Blades WDL  Spine WDL  (R) Arm/Elbow/Hand Bruising and scabs  (L) Arm/Elbow/Hand Bruising and Scab  Abdomen Redness and healed wound on lower abd fold  Groin Redness  Scrotum/Coccyx/Buttocks WDL  (R) Leg Redness spot about 0.5 x 0.5 cm, non blanching. Brace to R ankle  (L) Leg Redness, blanching, smal healing scratch  (R) Heel/Foot/Toe WDL  (L) Heel/Foot/Toe Redness and Blanching          Devices In Places Tele Box, brace to R ankle      Interventions In Place Pillows    Possible Skin Injury Yes    Pictures Uploaded Into Epic Yes  Wound Consult Placed Yes  RN Wound Prevention Protocol Ordered Yes   Failure to thrive in adult Failure to thrive in adult Failure to thrive in adult Bronchitis

## 2021-06-23 NOTE — ASSESSMENT & PLAN NOTE
- Not currently in exacerbation.  - Recent echo reviewed, shows improvement in ejection fraction up to 70% compared to 20% back in November 2019 (had Takotsubo's)  - Decreased home carvedilol  given her hypotension and syncope.  - Reduced home dose losartan given her hypotension and syncope.   - Hold home furosemide given her dehydration.  Daily strict input and output  Daily standing weights.

## 2021-06-23 NOTE — PROGRESS NOTES
Hospital Medicine Daily Progress Note    Date of Service  6/23/2021    Chief Complaint  55 y.o. female admitted 6/22/2021 with syncope    Hospital Course  55 y.o. female with a past medical history of chronic systolic heart failure, hypertension, depression, hypothyroidism, adrenal insufficiency and history of pulmonary embolism on anticoagulation with apixaban who presented 6/22/2021 with multiple episodes of transient loss of consciousness.  Patient reports that she had multiple episodes of syncope on the day of admission episodes lasting between 2-3 minutes.  All of them were preceded with a change in posture from lying in bed and standing or from sitting in the chair to start walking.  Patient denies having palpitations shortness of breath or chest pain.  There were no fecal or urinary incontinence.  There are no abnormal jerking movements or tongue biting.  Patient reports that her heart failure medications were recently increased and since then she has been having more frequent syncope attacks.    Interval Problem Update  6/23 - Pt significantly orthostatic this morning, dropped from 144 -->71 systolic with standing with significant rise in HR.  Discussed with patient - she has a medically complex situation, likely complicated by her adrenal insufficiency, need for cardiac meds given heart failure, and likely autonomic dysfunction. Having mild intermittent bradycardia that may be contributing as well. She is feeling better today but is not currently at her baseline.    Consultants/Specialty  None    Code Status  Full Code    Disposition  TBD    Review of Systems  Review of Systems   Constitutional: Negative for chills and fever.   Respiratory: Negative for cough and shortness of breath.    Cardiovascular: Negative for chest pain and leg swelling.   Gastrointestinal: Negative for abdominal pain, nausea and vomiting.   Skin: Rash: Skin breakage.   Neurological: Positive for dizziness. Negative for focal  weakness.   All other systems reviewed and are negative.       Physical Exam  Temp:  [36.2 °C (97.2 °F)-36.6 °C (97.9 °F)] 36.2 °C (97.2 °F)  Pulse:  [51-95] 58  Resp:  [17-18] 18  BP: ()/(41-94) 115/73  SpO2:  [95 %-100 %] 95 %    Physical Exam  Vitals and nursing note reviewed.   Constitutional:       Appearance: Normal appearance. She is obese. She is not ill-appearing.   HENT:      Head: Normocephalic and atraumatic.      Mouth/Throat:      Mouth: Mucous membranes are dry.   Cardiovascular:      Rate and Rhythm: Regular rhythm. Bradycardia present.   Pulmonary:      Effort: Pulmonary effort is normal. No respiratory distress.      Breath sounds: Normal breath sounds. No wheezing or rales.   Abdominal:      General: Abdomen is flat. Bowel sounds are normal.      Palpations: Abdomen is soft.   Musculoskeletal:         General: No swelling.   Skin:     General: Skin is warm and dry.      Capillary Refill: Capillary refill takes 2 to 3 seconds.      Comments: Multiple skin tears     Neurological:      General: No focal deficit present.      Mental Status: She is alert and oriented to person, place, and time.      Cranial Nerves: No cranial nerve deficit.   Psychiatric:         Mood and Affect: Mood normal.         Behavior: Behavior normal.         Fluids    Intake/Output Summary (Last 24 hours) at 6/23/2021 1540  Last data filed at 6/23/2021 0548  Gross per 24 hour   Intake --   Output 575 ml   Net -575 ml       Laboratory  Recent Labs     06/22/21  1337 06/23/21  1113   WBC 7.5 9.1   RBC 4.13* 3.87*   HEMOGLOBIN 12.6 11.7*   HEMATOCRIT 40.5 37.4   MCV 98.1* 96.6   MCH 30.5 30.2   MCHC 31.1* 31.3*   RDW 57.7* 56.6*   PLATELETCT 306 279   MPV 10.9 10.9     Recent Labs     06/22/21  1337 06/23/21  1113   SODIUM 136 137   POTASSIUM 4.3 4.8   CHLORIDE 102 105   CO2 21 21   GLUCOSE 163* 126*   BUN 30* 26*   CREATININE 1.20 0.94   CALCIUM 8.4 8.0*                   Imaging  DX-CHEST-PORTABLE (1 VIEW)   Final Result       No acute cardiopulmonary abnormality identified.           Assessment/Plan  * Syncope- (present on admission)  Assessment & Plan  - Significantly multifactorial - patient has been admitted numerous times with orthostasis and syncope.  I think she most likely has an autonomic disorder contributing to her orthostasis, that is worsened by her known adrenal insufficiency and need for ARBs/BB due to a history of systolic heart failure  - Her goal will likely be to utilize the lowest dose of Coreg and Losartan that will maintain a good cardiac output for her while minimizing orthostasis  - Avoid volume depletion - she is on 40mg of Lasix daily but has a recovered EF - she likely does not need such a large dose. Will consider 20mg daily or qod for peripheral edema at discharge   - Dr Pryor at [facility] specializes in autonomic disorders - pt already sees an NP there for migraines, we will try to get her an appt with him to evaluate for autonomic dysfunction at discharge. If not, there is an autonomic disorder specialist at [place] that we can try to get her in with  - In the meantime, will add Florinef to her hydrocortisone to add some mineralocorticoid activity.     History of pulmonary embolism- (present on admission)  Assessment & Plan  Diagnosed last May  Resumed home anticoagulation with apixaban.    MRSA (methicillin resistant Staphylococcus aureus) sinus infection- (present on admission)  Assessment & Plan  Resume home infusions of Eravacycline Dihydrochloride    Adrenal insufficiency (Valentín's disease) ([facility])- (present on admission)  Assessment & Plan  Resumed home dose hydrocortisone, sees Endocrine as an outpatient. Will add Florinef for mineralocorticoid activity.     MARY LOU (acute kidney injury) ([facility])  Assessment & Plan  Mostly due to dehydration secondary to polypharmacy/overdiuresis  Gentle hydration with intravenous fluids given her chronic heart failure  Avoid nephrotoxins, monitor inputs and  outputs     Hypertension- (present on admission)  Assessment & Plan  Currently hypotensive with multiple episodes of syncope.  Reduced dose of carvedilol and losartan with hold parameters.  We will hold home furosemide, doxazosin and amiloride at this point    Hypothyroidism- (present on admission)  Assessment & Plan  Resumed home levothyroxine - a little overcorrected TSH on last labs, but free T4 was normal, will have pt recheck with her PCP in three weeks    CKD (chronic kidney disease) stage 3, GFR 30-59 ml/min ([facility])- (present on admission)  Assessment & Plan  Avoid nephrotoxins as much as possible, renally dose medications, monitor inputs and outputs     Depression- (present on admission)  Assessment & Plan  Resumed home amitriptyline and duloxetine  Antidepressants can be associated with orthostasis, will discuss with patient whether she is comfortable tapering those as an outpatient     Chronic systolic heart failure (HCC)- (present on admission)  Assessment & Plan  - Not currently in exacerbation.  - Recent echo reviewed, shows improvement in ejection fraction up to 70% compared to 20% back in November 2019 (had Takotsubo's)  - Decreased home carvedilol  given her hypotension and syncope.  - Reduced home dose losartan given her hypotension and syncope.   - Hold home furosemide given her dehydration.  Daily strict input and output  Daily standing weights.    Gastroesophageal reflux disease without esophagitis- (present on admission)  Assessment & Plan  Continue home omeprazole       VTE prophylaxis: Eliquis

## 2021-06-23 NOTE — ASSESSMENT & PLAN NOTE
Mostly due to dehydration secondary to polypharmacy/overdiuresis  Stop IVFs today  Avoid nephrotoxins, monitor inputs and outputs

## 2021-06-23 NOTE — ASSESSMENT & PLAN NOTE
Resumed home amitriptyline and duloxetine  Antidepressants can be associated with orthostasis, will discuss with patient whether she is comfortable tapering those as an outpatient

## 2021-06-23 NOTE — CARE PLAN
The patient is Watcher - Medium risk of patient condition declining or worsening    Shift Goals  Clinical Goals: prevent hypotension and related syncopal episodes  Patient Goals: stabilize BP    Progress made toward(s) clinical / shift goals:  hold BP medications per MAR parameters; review orthostatic vitals with MD      Problem: Communication  Goal: The ability to communicate needs accurately and effectively will improve  Outcome: Progressing  Note: Pt communicates needs appropriately with staff. Demonstrates effective use of call light.     Problem: Fluid Volume  Goal: Fluid volume balance will be maintained  Outcome: Progressing  Note: Gentle IV fluids rehydration, monitoring I/O. Pt with purewick in place to measure urine output.

## 2021-06-23 NOTE — PROGRESS NOTES
Per Dr. Andrews, attempt to repeat orthostatic vitals on pt.    Attempted to repeat orthostatic vitals. While supine, BP 120s/70s. Sitting at edge of bed, BP 80s/70s. While standing for one minute, pt had a syncopal episode and collapsed onto the bed. Pt was unconscious for 45 seconds before coming to. Rapid Response called.    Pt placed supine, 90/60s BP. EKG done and tele reviewed - sinus rhythm. Dr. Andrews at bedside. New orders received for IV fluid bolus, steroids, and analgesics for head pain.

## 2021-06-23 NOTE — CARE PLAN
The patient is Watcher - Medium risk of patient condition declining or worsening    Shift Goals  Clinical Goals: Safety  Patient Goals: Sleep well, ambulate in the morning    Progress made toward(s) clinical / shift goals:  Pt using call light properly as needed. All safety measures in place.    Patient is not progressing towards the following goals:      Problem: Pain - Standard  Goal: Alleviation of pain or a reduction in pain to the patient’s comfort goal  Outcome: Progressing     Problem: Knowledge Deficit - Standard  Goal: Patient and family/care givers will demonstrate understanding of plan of care, disease process/condition, diagnostic tests and medications  Outcome: Progressing     Problem: Fall Risk  Goal: Patient will remain free from falls  Outcome: Progressing     Problem: Syncope Management  Goal: Patient's signs and symptoms of syncope will be assessed and managed  Outcome: Progressing  Goal: Cardiac arrhythmias will be absent or managed  Outcome: Progressing  Goal: Patient's vital signs will be with within normal range or improve  Outcome: Progressing  Goal: Patient's risk for medication side effects that could worsen signs and symptoms of syncope will be decreased  Outcome: Progressing     Problem: Syncope Management  Goal: Cardiac arrhythmias will be absent or managed  Outcome: Progressing     Problem: Syncope Management  Goal: Patient's vital signs will be with within normal range or improve  Outcome: Progressing

## 2021-06-23 NOTE — ASSESSMENT & PLAN NOTE
Resumed home dose hydrocortisone, sees Endocrine as an outpatient. Added Florinef for mineralocorticoid activity.

## 2021-06-24 LAB
ANION GAP SERPL CALC-SCNC: 8 MMOL/L (ref 7–16)
BASOPHILS # BLD AUTO: 0.6 % (ref 0–1.8)
BASOPHILS # BLD: 0.04 K/UL (ref 0–0.12)
BUN SERPL-MCNC: 23 MG/DL (ref 8–22)
CALCIUM SERPL-MCNC: 7.7 MG/DL (ref 8.4–10.2)
CHLORIDE SERPL-SCNC: 109 MMOL/L (ref 96–112)
CO2 SERPL-SCNC: 21 MMOL/L (ref 20–33)
CREAT SERPL-MCNC: 0.88 MG/DL (ref 0.5–1.4)
EKG IMPRESSION: NORMAL
EOSINOPHIL # BLD AUTO: 0.04 K/UL (ref 0–0.51)
EOSINOPHIL NFR BLD: 0.6 % (ref 0–6.9)
ERYTHROCYTE [DISTWIDTH] IN BLOOD BY AUTOMATED COUNT: 57.1 FL (ref 35.9–50)
GLUCOSE SERPL-MCNC: 121 MG/DL (ref 65–99)
HCT VFR BLD AUTO: 38.1 % (ref 37–47)
HGB BLD-MCNC: 11.7 G/DL (ref 12–16)
IMM GRANULOCYTES # BLD AUTO: 0.02 K/UL (ref 0–0.11)
IMM GRANULOCYTES NFR BLD AUTO: 0.3 % (ref 0–0.9)
LYMPHOCYTES # BLD AUTO: 1.59 K/UL (ref 1–4.8)
LYMPHOCYTES NFR BLD: 24.7 % (ref 22–41)
MAGNESIUM SERPL-MCNC: 1.5 MG/DL (ref 1.5–2.5)
MCH RBC QN AUTO: 29.8 PG (ref 27–33)
MCHC RBC AUTO-ENTMCNC: 30.7 G/DL (ref 33.6–35)
MCV RBC AUTO: 97.2 FL (ref 81.4–97.8)
MONOCYTES # BLD AUTO: 0.82 K/UL (ref 0–0.85)
MONOCYTES NFR BLD AUTO: 12.8 % (ref 0–13.4)
NEUTROPHILS # BLD AUTO: 3.92 K/UL (ref 2–7.15)
NEUTROPHILS NFR BLD: 61 % (ref 44–72)
NRBC # BLD AUTO: 0 K/UL
NRBC BLD-RTO: 0 /100 WBC
PHOSPHATE SERPL-MCNC: 3.4 MG/DL (ref 2.5–4.5)
PLATELET # BLD AUTO: 285 K/UL (ref 164–446)
PMV BLD AUTO: 11.1 FL (ref 9–12.9)
POTASSIUM SERPL-SCNC: 4.3 MMOL/L (ref 3.6–5.5)
RBC # BLD AUTO: 3.92 M/UL (ref 4.2–5.4)
SODIUM SERPL-SCNC: 138 MMOL/L (ref 135–145)
WBC # BLD AUTO: 6.4 K/UL (ref 4.8–10.8)

## 2021-06-24 PROCEDURE — 700102 HCHG RX REV CODE 250 W/ 637 OVERRIDE(OP): Performed by: FAMILY MEDICINE

## 2021-06-24 PROCEDURE — 700102 HCHG RX REV CODE 250 W/ 637 OVERRIDE(OP): Performed by: INTERNAL MEDICINE

## 2021-06-24 PROCEDURE — 84100 ASSAY OF PHOSPHORUS: CPT

## 2021-06-24 PROCEDURE — 83735 ASSAY OF MAGNESIUM: CPT

## 2021-06-24 PROCEDURE — A9270 NON-COVERED ITEM OR SERVICE: HCPCS | Performed by: HOSPITALIST

## 2021-06-24 PROCEDURE — 99232 SBSQ HOSP IP/OBS MODERATE 35: CPT | Performed by: FAMILY MEDICINE

## 2021-06-24 PROCEDURE — 85025 COMPLETE CBC W/AUTO DIFF WBC: CPT

## 2021-06-24 PROCEDURE — 700105 HCHG RX REV CODE 258: Performed by: HOSPITALIST

## 2021-06-24 PROCEDURE — 93010 ELECTROCARDIOGRAM REPORT: CPT | Performed by: INTERNAL MEDICINE

## 2021-06-24 PROCEDURE — 770020 HCHG ROOM/CARE - TELE (206)

## 2021-06-24 PROCEDURE — 80048 BASIC METABOLIC PNL TOTAL CA: CPT

## 2021-06-24 PROCEDURE — 700102 HCHG RX REV CODE 250 W/ 637 OVERRIDE(OP): Performed by: HOSPITALIST

## 2021-06-24 PROCEDURE — A9270 NON-COVERED ITEM OR SERVICE: HCPCS | Performed by: INTERNAL MEDICINE

## 2021-06-24 PROCEDURE — A9270 NON-COVERED ITEM OR SERVICE: HCPCS | Performed by: FAMILY MEDICINE

## 2021-06-24 RX ORDER — ACETAMINOPHEN 325 MG/1
650 TABLET ORAL EVERY 4 HOURS PRN
Status: DISCONTINUED | OUTPATIENT
Start: 2021-06-24 | End: 2021-06-28 | Stop reason: HOSPADM

## 2021-06-24 RX ORDER — NYSTATIN 100000 [USP'U]/G
POWDER TOPICAL 2 TIMES DAILY
Status: DISCONTINUED | OUTPATIENT
Start: 2021-06-24 | End: 2021-06-28 | Stop reason: HOSPADM

## 2021-06-24 RX ADMIN — AMITRIPTYLINE HYDROCHLORIDE 10 MG: 10 TABLET, FILM COATED ORAL at 05:34

## 2021-06-24 RX ADMIN — HYDROCORTISONE 10 MG: 10 TABLET ORAL at 05:34

## 2021-06-24 RX ADMIN — LOSARTAN POTASSIUM 25 MG: 25 TABLET, FILM COATED ORAL at 05:39

## 2021-06-24 RX ADMIN — DULOXETINE HYDROCHLORIDE 60 MG: 30 CAPSULE, DELAYED RELEASE ORAL at 21:49

## 2021-06-24 RX ADMIN — Medication 2000 UNITS: at 05:34

## 2021-06-24 RX ADMIN — CALCITONIN SALMON 200 UNITS: 200 SPRAY, METERED NASAL at 21:55

## 2021-06-24 RX ADMIN — GABAPENTIN 400 MG: 400 CAPSULE ORAL at 05:34

## 2021-06-24 RX ADMIN — COLESEVELAM HYDROCHLORIDE 625 MG: 625 TABLET, FILM COATED ORAL at 15:19

## 2021-06-24 RX ADMIN — LEVOTHYROXINE SODIUM 75 MCG: 0.07 TABLET ORAL at 05:34

## 2021-06-24 RX ADMIN — SUMATRIPTAN SUCCINATE 50 MG: 25 TABLET ORAL at 08:19

## 2021-06-24 RX ADMIN — COLESEVELAM HYDROCHLORIDE 625 MG: 625 TABLET, FILM COATED ORAL at 08:19

## 2021-06-24 RX ADMIN — OMEPRAZOLE 20 MG: 20 CAPSULE, DELAYED RELEASE ORAL at 05:34

## 2021-06-24 RX ADMIN — COLESEVELAM HYDROCHLORIDE 625 MG: 625 TABLET, FILM COATED ORAL at 21:49

## 2021-06-24 RX ADMIN — APIXABAN 5 MG: 5 TABLET, FILM COATED ORAL at 17:27

## 2021-06-24 RX ADMIN — GABAPENTIN 400 MG: 400 CAPSULE ORAL at 17:26

## 2021-06-24 RX ADMIN — SODIUM CHLORIDE 1000 ML: 9 INJECTION, SOLUTION INTRAVENOUS at 04:39

## 2021-06-24 RX ADMIN — APIXABAN 5 MG: 5 TABLET, FILM COATED ORAL at 05:34

## 2021-06-24 RX ADMIN — HYDROCORTISONE 10 MG: 10 TABLET ORAL at 17:26

## 2021-06-24 RX ADMIN — SUMATRIPTAN SUCCINATE 50 MG: 25 TABLET ORAL at 17:27

## 2021-06-24 RX ADMIN — LIOTHYRONINE SODIUM 25 MCG: 5 TABLET ORAL at 21:48

## 2021-06-24 RX ADMIN — NYSTATIN: 100000 POWDER TOPICAL at 23:18

## 2021-06-24 RX ADMIN — FLUDROCORTISONE ACETATE 0.1 MG: 0.1 TABLET ORAL at 05:34

## 2021-06-24 RX ADMIN — MONTELUKAST 10 MG: 10 TABLET, FILM COATED ORAL at 17:26

## 2021-06-24 ASSESSMENT — PAIN DESCRIPTION - PAIN TYPE
TYPE: ACUTE PAIN
TYPE: ACUTE PAIN

## 2021-06-24 ASSESSMENT — ENCOUNTER SYMPTOMS
COUGH: 0
NAUSEA: 0
VOMITING: 0
FOCAL WEAKNESS: 0
CHILLS: 0
ABDOMINAL PAIN: 0
SHORTNESS OF BREATH: 0
FEVER: 0
DIZZINESS: 1

## 2021-06-24 ASSESSMENT — FIBROSIS 4 INDEX: FIB4 SCORE: 1.43

## 2021-06-24 NOTE — CARE PLAN
The patient is Watcher - Medium risk of patient condition declining or worsening    Shift Goals  Clinical Goals: Monitor for hypotension and prevent syncopal episodes  Patient Goals: Rest    Progress made toward(s) clinical / shift goals:  Pt remained free from syncopal episodes and was closely monitored for changes in blood pressure when changing positions. Pt was able to receive adequate rest with no acute events overnight.    Patient is not progressing towards the following goals:      Problem: Syncope Management  Goal: Patient's signs and symptoms of syncope will be assessed and managed  Outcome: Progressing

## 2021-06-24 NOTE — PROGRESS NOTES
Telemetry Shift Summary    Rhythm: sr/sb  HR: 40-60  Ectopy: r-pvc    Measurements for strip printed 6/23/2021 at 1341  HR 49  0.20 / 0.08 / 0.48    Normal Values  Rhythm: SR  HR:   Measurements: 0.12-0.20 / 0.06-0.10 / 0.30-0.52

## 2021-06-24 NOTE — PROGRESS NOTES
Tele Note  Sinus Rhythm/Sinus Bradycardia Rate 45-75  First Degree AV Block  Rare PVC's  .22/.08/.42

## 2021-06-24 NOTE — PROGRESS NOTES
Pt resting calmly in bed, Pt not currently getting OOB due to severe orthostatic hypotension per MD orders. POC reivewed with Pt, Pt using call light appropriately, VU of all fall and safety precautions, WCM

## 2021-06-24 NOTE — WOUND TEAM
"Wound team consulted for redness to the R lateral ankle.  Patient states she has been undergoing treatment for a broken ankle and was advised to \"wean\" out of her ankle brace, she had not worn it for awhile but decided to put it back on.  She states that she usually wears thick socks when she has it on, but didn't this time and it was rubbing on her lateral ankle.  The area is pink and blanching with a small blister noted to the center.  Ankle was offloaded with a heel mepilex, no further wound care needs noted.    "

## 2021-06-24 NOTE — PROGRESS NOTES
Report received from AILYN Bunch. Plan of care discussed at patient's bedside. Whiteboard has been updated. Pt is resting comfortably in bed and declines any further needs at this time. Safety precautions in place, bed alarm on, and call light within reach.

## 2021-06-24 NOTE — PROGRESS NOTES
Hospital Medicine Daily Progress Note    Date of Service  6/24/2021    Chief Complaint  55 y.o. female admitted 6/22/2021 with syncope    Hospital Course  55 y.o. female with a past medical history of chronic systolic heart failure, hypertension, depression, hypothyroidism, adrenal insufficiency and history of pulmonary embolism on anticoagulation with apixaban who presented 6/22/2021 with multiple episodes of transient loss of consciousness.  Patient reports that she had multiple episodes of syncope on the day of admission episodes lasting between 2-3 minutes.  All of them were preceded with a change in posture from lying in bed and standing or from sitting in the chair to start walking.  Patient denies having palpitations shortness of breath or chest pain.  There were no fecal or urinary incontinence.  There are no abnormal jerking movements or tongue biting.  Patient reports that her heart failure medications were recently increased and since then she has been having more frequent syncope attacks.    Interval Problem Update  6/23 - Pt significantly orthostatic this morning, dropped from 144 -->71 systolic with standing with significant rise in HR.  Discussed with patient - she has a medically complex situation, likely complicated by her adrenal insufficiency, need for cardiac meds given heart failure, and likely autonomic dysfunction. Having mild intermittent bradycardia that may be contributing as well. She is feeling better today but is not currently at her baseline.    Consultants/Specialty  None    Code Status  Full Code    Disposition  TBD    Review of Systems  Review of Systems   Constitutional: Negative for chills and fever.   Respiratory: Negative for cough and shortness of breath.    Cardiovascular: Negative for chest pain and leg swelling.   Gastrointestinal: Negative for abdominal pain, nausea and vomiting.   Skin: Rash: Skin breakage.   Neurological: Positive for dizziness. Negative for focal  weakness.   All other systems reviewed and are negative.       Physical Exam  Temp:  [36.3 °C (97.3 °F)-36.7 °C (98 °F)] 36.3 °C (97.3 °F)  Pulse:  [50-81] 63  Resp:  [16-18] 18  BP: ()/(59-92) 121/61  SpO2:  [90 %-100 %] 100 %    Physical Exam  Vitals and nursing note reviewed.   Constitutional:       Appearance: Normal appearance. She is obese. She is not ill-appearing.   HENT:      Head: Normocephalic and atraumatic.      Mouth/Throat:      Mouth: Mucous membranes are dry.   Cardiovascular:      Rate and Rhythm: Regular rhythm. Bradycardia present.   Pulmonary:      Effort: Pulmonary effort is normal. No respiratory distress.      Breath sounds: Normal breath sounds. No wheezing or rales.   Abdominal:      General: Abdomen is flat. Bowel sounds are normal.      Palpations: Abdomen is soft.   Musculoskeletal:         General: No swelling.   Skin:     General: Skin is warm and dry.      Capillary Refill: Capillary refill takes 2 to 3 seconds.      Comments: Multiple skin tears     Neurological:      General: No focal deficit present.      Mental Status: She is alert and oriented to person, place, and time.      Cranial Nerves: No cranial nerve deficit.   Psychiatric:         Mood and Affect: Mood normal.         Behavior: Behavior normal.         Fluids    Intake/Output Summary (Last 24 hours) at 6/24/2021 1533  Last data filed at 6/24/2021 1400  Gross per 24 hour   Intake 480 ml   Output 2100 ml   Net -1620 ml       Laboratory  Recent Labs     06/22/21  1337 06/23/21  1113 06/24/21  0252   WBC 7.5 9.1 6.4   RBC 4.13* 3.87* 3.92*   HEMOGLOBIN 12.6 11.7* 11.7*   HEMATOCRIT 40.5 37.4 38.1   MCV 98.1* 96.6 97.2   MCH 30.5 30.2 29.8   MCHC 31.1* 31.3* 30.7*   RDW 57.7* 56.6* 57.1*   PLATELETCT 306 279 285   MPV 10.9 10.9 11.1     Recent Labs     06/22/21  1337 06/23/21  1113 06/24/21  0252   SODIUM 136 137 138   POTASSIUM 4.3 4.8 4.3   CHLORIDE 102 105 109   CO2 21 21 21   GLUCOSE 163* 126* 121*   BUN 30* 26* 23*    CREATININE 1.20 0.94 0.88   CALCIUM 8.4 8.0* 7.7*                   Imaging  CT-HEAD W/O   Final Result      No acute intracranial abnormality.      DX-CHEST-PORTABLE (1 VIEW)   Final Result      No acute cardiopulmonary abnormality identified.           Assessment/Plan  * Syncope due to orthostatic hypotension- (present on admission)  Assessment & Plan  - Significantly multifactorial - patient has been admitted numerous times with orthostasis and syncope.  I think she most likely has an autonomic disorder contributing to her orthostasis, that is worsened by her known adrenal insufficiency and need for ARBs/BB due to a history of systolic heart failure  - Her goal will likely be to utilize the lowest dose of Coreg and Losartan that will maintain a good cardiac output for her while minimizing orthostasis  - Avoid volume depletion - she is on 40mg of Lasix daily but has a recovered EF - she likely does not need such a large dose. Will consider 20mg daily or PRN for peripheral edema at discharge   - Dr Pryor at [facility] specializes in autonomic disorders - pt already sees an NP there for migraines, we will try to get her an appt with him to evaluate for autonomic dysfunction at discharge. If not, there is an autonomic disorder specialist at [place] that we can try to get her in with  - In the meantime, added Florinef to her hydrocortisone to add some mineralocorticoid activity.   - Added an abdominal binder and SILVERIO hose with good effect   - Looking at getting pt to rehab    History of pulmonary embolism- (present on admission)  Assessment & Plan  Diagnosed last May  Resumed home anticoagulation with apixaban.    MRSA (methicillin resistant Staphylococcus aureus) sinus infection- (present on admission)  Assessment & Plan  Resume home infusions of Eravacycline Dihydrochloride    Adrenal insufficiency ([place]'s disease) (HCC)- (present on admission)  Assessment & Plan  Resumed home dose hydrocortisone, sees  Endocrine as an outpatient. Added Florinef for mineralocorticoid activity.     MARY LOU (acute kidney injury) ([facility])  Assessment & Plan  Mostly due to dehydration secondary to polypharmacy/overdiuresis  Gentle hydration with intravenous fluids given her chronic heart failure - off tomorrow if Cr and BUN continue to improve  Avoid nephrotoxins, monitor inputs and outputs     Hypertension- (present on admission)  Assessment & Plan  Difficult to manage with her orthostasis  Reduced dose of carvedilol and losartan with hold parameters.  We will hold home furosemide, doxazosin and amiloride at this point    Hypothyroidism- (present on admission)  Assessment & Plan  Resumed home levothyroxine - a little overcorrected TSH on last labs, but free T4 was normal, will have pt recheck with her PCP in three weeks    CKD (chronic kidney disease) stage 3, GFR 30-59 ml/min ([facility])- (present on admission)  Assessment & Plan  Avoid nephrotoxins as much as possible, renally dose medications, monitor inputs and outputs     Depression- (present on admission)  Assessment & Plan  Resumed home amitriptyline and duloxetine  Antidepressants can be associated with orthostasis, will discuss with patient whether she is comfortable tapering those as an outpatient     Chronic systolic heart failure (HCC)- (present on admission)  Assessment & Plan  - Not currently in exacerbation.  - Recent echo reviewed, shows improvement in ejection fraction up to 70% compared to 20% back in November 2019 (had Takotsubo's)  - Decreased home carvedilol  given her hypotension and syncope.  - Reduced home dose losartan given her hypotension and syncope.   - Hold home furosemide given her dehydration.  Daily strict input and output  Daily standing weights.    Gastroesophageal reflux disease without esophagitis- (present on admission)  Assessment & Plan  Continue home omeprazole       VTE prophylaxis: Eliquis

## 2021-06-24 NOTE — DISCHARGE PLANNING
[facility] Transitional [facility] Coordination    Referral from:  Dr. Andrews    Insurance Provider on Facesheet:MCR/ANT    Potential Rehab Diagnosis: Autonomic Disorder     Chart review indicates patient may have on going medical management and may have therapy needs to possibly meet inpatient rehab facility criteria with the goal of returning to community.    D/C support: TBD     Physiatry consultation pended per protocol.     Autonomic Disorder.  W/U & TX pending.  Waiting on additional information to determine appropriateness for acute inpatient rehabilitation. Will continue to follow.      Thank you for the referral.

## 2021-06-24 NOTE — CARE PLAN
The patient is Watcher - Medium risk of patient condition declining or worsening    Shift Goals  Clinical Goals: Monitor for hypotension and prevent syncopal episodes  Patient Goals: Rest    Progress made toward(s) clinical / shift goals:  No syncopal episode for day shift 6/24    Patient is not progressing towards the following goals:Pt has not been out of bed, or mobilized. PT ordered.       Problem: Syncope Management  Goal: Patient's signs and symptoms of syncope will be assessed and managed  Outcome: Not Progressing  Note: Pt had syncopal episode yesterday 6/23 during orthostatics, Pt has not been out of bed this shift.

## 2021-06-24 NOTE — PROGRESS NOTES
Telemetry Shift Summary      Rhythm: SB/SR w/ 1st AVB  HR Range: 52-80  Ectopy: R PVC  Measurements: .22/.08/.42    Normal Values:  Rhythm: SR  HR Range:   Measurements: 0.12-0.20/ 0.06-0.10/ 0.30-0.52

## 2021-06-25 LAB
ANION GAP SERPL CALC-SCNC: 11 MMOL/L (ref 7–16)
BACTERIA UR CULT: NORMAL
BASOPHILS # BLD AUTO: 0.4 % (ref 0–1.8)
BASOPHILS # BLD: 0.03 K/UL (ref 0–0.12)
BUN SERPL-MCNC: 18 MG/DL (ref 8–22)
CALCIUM SERPL-MCNC: 8.1 MG/DL (ref 8.4–10.2)
CHLORIDE SERPL-SCNC: 107 MMOL/L (ref 96–112)
CO2 SERPL-SCNC: 18 MMOL/L (ref 20–33)
CREAT SERPL-MCNC: 0.61 MG/DL (ref 0.5–1.4)
EOSINOPHIL # BLD AUTO: 0.14 K/UL (ref 0–0.51)
EOSINOPHIL NFR BLD: 1.9 % (ref 0–6.9)
ERYTHROCYTE [DISTWIDTH] IN BLOOD BY AUTOMATED COUNT: 58.4 FL (ref 35.9–50)
GLUCOSE SERPL-MCNC: 104 MG/DL (ref 65–99)
HCT VFR BLD AUTO: 45.5 % (ref 37–47)
HGB BLD-MCNC: 13.7 G/DL (ref 12–16)
IMM GRANULOCYTES # BLD AUTO: 0.02 K/UL (ref 0–0.11)
IMM GRANULOCYTES NFR BLD AUTO: 0.3 % (ref 0–0.9)
LYMPHOCYTES # BLD AUTO: 2 K/UL (ref 1–4.8)
LYMPHOCYTES NFR BLD: 27.1 % (ref 22–41)
MCH RBC QN AUTO: 29.4 PG (ref 27–33)
MCHC RBC AUTO-ENTMCNC: 30.1 G/DL (ref 33.6–35)
MCV RBC AUTO: 97.6 FL (ref 81.4–97.8)
MONOCYTES # BLD AUTO: 0.92 K/UL (ref 0–0.85)
MONOCYTES NFR BLD AUTO: 12.5 % (ref 0–13.4)
NEUTROPHILS # BLD AUTO: 4.27 K/UL (ref 2–7.15)
NEUTROPHILS NFR BLD: 57.8 % (ref 44–72)
NRBC # BLD AUTO: 0 K/UL
NRBC BLD-RTO: 0 /100 WBC
PLATELET # BLD AUTO: 305 K/UL (ref 164–446)
PMV BLD AUTO: 11 FL (ref 9–12.9)
POTASSIUM SERPL-SCNC: 4.3 MMOL/L (ref 3.6–5.5)
RBC # BLD AUTO: 4.66 M/UL (ref 4.2–5.4)
SIGNIFICANT IND 70042: NORMAL
SITE SITE: NORMAL
SODIUM SERPL-SCNC: 136 MMOL/L (ref 135–145)
SOURCE SOURCE: NORMAL
WBC # BLD AUTO: 7.4 K/UL (ref 4.8–10.8)

## 2021-06-25 PROCEDURE — A9270 NON-COVERED ITEM OR SERVICE: HCPCS | Performed by: FAMILY MEDICINE

## 2021-06-25 PROCEDURE — 700102 HCHG RX REV CODE 250 W/ 637 OVERRIDE(OP): Performed by: HOSPITALIST

## 2021-06-25 PROCEDURE — A9270 NON-COVERED ITEM OR SERVICE: HCPCS | Performed by: HOSPITALIST

## 2021-06-25 PROCEDURE — 770020 HCHG ROOM/CARE - TELE (206)

## 2021-06-25 PROCEDURE — 700102 HCHG RX REV CODE 250 W/ 637 OVERRIDE(OP): Performed by: FAMILY MEDICINE

## 2021-06-25 PROCEDURE — A9270 NON-COVERED ITEM OR SERVICE: HCPCS | Performed by: INTERNAL MEDICINE

## 2021-06-25 PROCEDURE — 700102 HCHG RX REV CODE 250 W/ 637 OVERRIDE(OP): Performed by: INTERNAL MEDICINE

## 2021-06-25 PROCEDURE — 85025 COMPLETE CBC W/AUTO DIFF WBC: CPT

## 2021-06-25 PROCEDURE — 700105 HCHG RX REV CODE 258: Performed by: HOSPITALIST

## 2021-06-25 PROCEDURE — 80048 BASIC METABOLIC PNL TOTAL CA: CPT

## 2021-06-25 PROCEDURE — 97162 PT EVAL MOD COMPLEX 30 MIN: CPT

## 2021-06-25 PROCEDURE — 97165 OT EVAL LOW COMPLEX 30 MIN: CPT

## 2021-06-25 PROCEDURE — 99232 SBSQ HOSP IP/OBS MODERATE 35: CPT | Performed by: FAMILY MEDICINE

## 2021-06-25 RX ADMIN — GABAPENTIN 400 MG: 400 CAPSULE ORAL at 06:00

## 2021-06-25 RX ADMIN — NYSTATIN: 100000 POWDER TOPICAL at 05:18

## 2021-06-25 RX ADMIN — HYDROCORTISONE 10 MG: 10 TABLET ORAL at 05:13

## 2021-06-25 RX ADMIN — CALCITONIN SALMON 200 UNITS: 200 SPRAY, METERED NASAL at 20:17

## 2021-06-25 RX ADMIN — CARVEDILOL 3.12 MG: 3.12 TABLET, FILM COATED ORAL at 17:11

## 2021-06-25 RX ADMIN — APIXABAN 5 MG: 5 TABLET, FILM COATED ORAL at 17:10

## 2021-06-25 RX ADMIN — OMEPRAZOLE 20 MG: 20 CAPSULE, DELAYED RELEASE ORAL at 05:13

## 2021-06-25 RX ADMIN — ACETAMINOPHEN 650 MG: 325 TABLET, FILM COATED ORAL at 20:15

## 2021-06-25 RX ADMIN — APIXABAN 5 MG: 5 TABLET, FILM COATED ORAL at 05:13

## 2021-06-25 RX ADMIN — COLESEVELAM HYDROCHLORIDE 625 MG: 625 TABLET, FILM COATED ORAL at 14:53

## 2021-06-25 RX ADMIN — LEVOTHYROXINE SODIUM 75 MCG: 0.07 TABLET ORAL at 05:14

## 2021-06-25 RX ADMIN — SUMATRIPTAN SUCCINATE 50 MG: 25 TABLET ORAL at 16:07

## 2021-06-25 RX ADMIN — NYSTATIN: 100000 POWDER TOPICAL at 17:31

## 2021-06-25 RX ADMIN — HYDROCORTISONE 10 MG: 10 TABLET ORAL at 17:10

## 2021-06-25 RX ADMIN — GABAPENTIN 400 MG: 400 CAPSULE ORAL at 17:11

## 2021-06-25 RX ADMIN — Medication 2000 UNITS: at 05:12

## 2021-06-25 RX ADMIN — ACETAMINOPHEN 650 MG: 325 TABLET, FILM COATED ORAL at 09:02

## 2021-06-25 RX ADMIN — COLESEVELAM HYDROCHLORIDE 625 MG: 625 TABLET, FILM COATED ORAL at 20:16

## 2021-06-25 RX ADMIN — MONTELUKAST 10 MG: 10 TABLET, FILM COATED ORAL at 17:10

## 2021-06-25 RX ADMIN — DULOXETINE HYDROCHLORIDE 60 MG: 30 CAPSULE, DELAYED RELEASE ORAL at 20:15

## 2021-06-25 RX ADMIN — FLUDROCORTISONE ACETATE 0.1 MG: 0.1 TABLET ORAL at 05:13

## 2021-06-25 RX ADMIN — COLESEVELAM HYDROCHLORIDE 625 MG: 625 TABLET, FILM COATED ORAL at 08:59

## 2021-06-25 RX ADMIN — AMITRIPTYLINE HYDROCHLORIDE 10 MG: 10 TABLET, FILM COATED ORAL at 05:12

## 2021-06-25 RX ADMIN — LIOTHYRONINE SODIUM 25 MCG: 5 TABLET ORAL at 20:16

## 2021-06-25 RX ADMIN — CARVEDILOL 3.12 MG: 3.12 TABLET, FILM COATED ORAL at 07:59

## 2021-06-25 RX ADMIN — SODIUM CHLORIDE 1000 ML: 9 INJECTION, SOLUTION INTRAVENOUS at 03:47

## 2021-06-25 ASSESSMENT — COGNITIVE AND FUNCTIONAL STATUS - GENERAL
MOBILITY SCORE: 15
CLIMB 3 TO 5 STEPS WITH RAILING: TOTAL
SUGGESTED CMS G CODE MODIFIER MOBILITY: CK
SUGGESTED CMS G CODE MODIFIER DAILY ACTIVITY: CK
DRESSING REGULAR LOWER BODY CLOTHING: A LITTLE
TOILETING: A LOT
STANDING UP FROM CHAIR USING ARMS: TOTAL
WALKING IN HOSPITAL ROOM: TOTAL
HELP NEEDED FOR BATHING: A LOT
DAILY ACTIVITIY SCORE: 18
DRESSING REGULAR UPPER BODY CLOTHING: A LITTLE

## 2021-06-25 ASSESSMENT — FIBROSIS 4 INDEX
FIB4 SCORE: 1.33
FIB4 SCORE: 1.43

## 2021-06-25 ASSESSMENT — PAIN DESCRIPTION - PAIN TYPE
TYPE: ACUTE PAIN;CHRONIC PAIN
TYPE: ACUTE PAIN
TYPE: ACUTE PAIN
TYPE: ACUTE PAIN;CHRONIC PAIN
TYPE: ACUTE PAIN

## 2021-06-25 ASSESSMENT — ACTIVITIES OF DAILY LIVING (ADL): TOILETING: INDEPENDENT

## 2021-06-25 ASSESSMENT — ENCOUNTER SYMPTOMS
FOCAL WEAKNESS: 0
ABDOMINAL PAIN: 0
CHILLS: 0
SHORTNESS OF BREATH: 0
NAUSEA: 0
COUGH: 0
FEVER: 0
DIZZINESS: 1
VOMITING: 0

## 2021-06-25 ASSESSMENT — GAIT ASSESSMENTS: GAIT LEVEL OF ASSIST: UNABLE TO PARTICIPATE

## 2021-06-25 NOTE — THERAPY
Occupational Therapy   Initial Evaluation     Patient Name: Donna Isaac  Age:  55 y.o., Sex:  female  Medical Record #: [id number]  Today's Date: 6/25/2021     Precautions  Precautions: Fall Risk    Assessment  Patient is 55 y.o. female with a diagnosis of syncope, falls. Hx of chronic systolic heart failure, HTN, PE. Pt is A&O4, pleasant and cooperative. Motivated for OOB activity; shows good awareness of limitations due to recent hypostasis. On bedpan when approached; Tree with nicol-care after bm. Sup with sup><sit, STS (after several minutes setaed). Walks to sink with close SBA; seated tasks completed with sup. LB dressing with Tree. Shows decreased activity tolerance; pt is not at her baseline level with I/ADL's. Recommend further skilled OT at post-acute rehab setting to address activity tolerance/dynamic balance for I/ADL's. Lives with spouse.               Plan  Recommend Occupational Therapy for Evaluation only at this acute care setting.   DC Equipment Recommendations: Unable to determine at this time  Discharge Recommendations: Recommend post-acute placement for additional occupational therapy services prior to discharge home      06/25/21 0966   Prior Living Situation   Prior Services Intermittent Physical Support for ADL Per Family;Skilled Home Health Services   Housing / Facility 2 Story House   Steps Into Home 2   Steps In Home 14   Bathroom Set up Walk In Shower;Shower Chair   Equipment Owned Front-Wheel Walker;Tub / Shower Seat   Lives with - Patient's Self Care Capacity Spouse;Friends   Comments Pt and spouse are at friends' while they look for a house   Prior Level of ADL Function   Self Feeding Independent   Grooming / Hygiene Independent   Bathing Requires Assist   Dressing Requires Assist   Toileting Independent   Prior Level of IADL Function   Medication Management Unable To Determine At This Time   Laundry Requires Assist   Kitchen Mobility Requires Assist   Finances Requires Assist  "  Home Management Requires Assist   Shopping Requires Assist   Prior Level Of Mobility Independent With Device in Home   Driving / Transportation Relatives / Others Provide Transportation   Occupation (Pre-Hospital Vocational) Retired Due To Age   Leisure Interests Family;Hobbies   Cognition    Comments \"I used to be so active\"   Balance Assessment   Sitting Balance (Static) Good   Sitting Balance (Dynamic) Fair +   Standing Balance (Static) Fair +   Standing Balance (Dynamic) Fair +   Weight Shift Sitting Good   Weight Shift Standing Fair   Bed Mobility    Supine to Sit Modified Independent   ADL Assessment   Eating Independent   Grooming Supervision;Seated   Upper Body Dressing Supervision   Lower Body Dressing Minimal Assist   Toileting Moderate Assist  (bed pan for bm)   Comments seated activities at sink.   Functional Mobility   Sit to Stand Supervised   Bed, Chair, Wheelchair Transfer Minimal Assist   Visual Perception   Comments pt reports blurred initially when EOB, then resolves    Patient / Family Goals   Patient / Family Goal #1 To stop falling.    Interdisciplinary Plan of Care Collaboration   Collaboration Comments Aware of visit. Rec further OT at [facility] in order for pt to regain full independence.       "

## 2021-06-25 NOTE — PROGRESS NOTES
Monitor Summary     Rhythm: SB/SR 53-91  Measurements: 0.22/0.08/0.40  ECTOPIES: rPVC        Normal Values  Rhythm SR  HR Range    Measurements 0.12-0.20 / 0.06-0.10  / 0.30-0.52

## 2021-06-25 NOTE — THERAPY
Physical Therapy   Initial Evaluation     Patient Name: Donna Isaac  Age:  55 y.o., Sex:  female  Medical Record #: [id number]  Today's Date: 6/25/2021     Precautions: (P) Fall Risk, abdominal binder, and SILVERIO hose    Assessment  Patient is 55 y.o. female who was recently admitted for multiple episodes with LOC, syncopal episodes, and passing out. Pt was agreeable to therapy evaluation. Prior to evaluation pt demonstrated with supine BP of 131/76 HR of 68. With standing BP of 109/71 HR of 83. During inital stand pt reported of slight dizziness and was seated. Pt reported of improved symptoms and attempted standing again. Pt reported of no symptoms and attempted marching in place, with about 5-8 seconds of marching pt had an syncopal episode and pass out and was assisted into supine in bed. Once supine, pt immediately was conscious and was aware of passing out. RN aware of concerns with passing out with just standing and marching at this time. Pt demonstrated with BP of 152/82 and HR of 66 once supine. No reports of pain or injury after syncopal event. Pt currently is NOT safe to mobilize due to frequent syncopal episodes, recommend use of w/c for mobility and possibly slideboard transfers if standing continues to cause syncopal events. Recommend post-acute placement for continued physical therapy services prior to discharge home.        Plan    Recommend Physical Therapy 2 times per week until therapy goals are met for the following treatments:  Bed Mobility, Community Re-integration, Equipment, Gait Training, Manual Therapy, Neuro Re-Education / Balance, Self Care/Home Evaluation, Stair Training, Therapeutic Activities and Therapeutic Exercises    DC Equipment Recommendations: (P) Unable to determine at this time  Discharge Recommendations: (P) Recommend post-acute placement for additional physical therapy services prior to discharge home     Objective        06/25/21 1600   Initial Contact Note    Initial  Contact Note Order Received and Verified, Physical Therapy Evaluation in Progress with Full Report to Follow.   Precautions   Precautions Fall Risk   Comments frequent episodes of passing out; hypotension    Pain 0 - 10 Group   Therapist Pain Assessment Nurse Notified;0   Prior Living Situation   Prior Services Intermittent Physical Support for ADL Per Family;Skilled Home Health Services   Housing / Facility 2 Story House   Steps Into Home 2   Steps In Home 14   Rail Both Rail (Steps in Home)   Bathroom Set up Walk In Shower   Equipment Owned Front-Wheel Walker;Tub / Shower Seat   Lives with - Patient's Self Care Capacity Spouse;Friends   Comments pt and spouse are at [employer] and are currently in the process of buying a 1 story house; spouse is working and can only assist intermittently    Prior Level of Functional Mobility   Bed Mobility Independent   Transfer Status Independent   Ambulation Independent   Distance Ambulation (Feet)   (limited community )   Assistive Devices Used None   Stairs Required Assist   Comments pt reports of an IPLOF with limited mobility due to passing out; has assist with going up/down stairs at this time   History of Falls   History of Falls Yes   Date of Last Fall   (secondary to syncope and falls )   Cognition    Cognition / Consciousness WDL   Level of Consciousness Alert   Comments pleasant/cooperative    Passive ROM Lower Body   Passive ROM Lower Body WDL   Active ROM Lower Body    Active ROM Lower Body  WDL   Strength Lower Body   Lower Body Strength  WDL   Sensation Lower Body   Lower Extremity Sensation   WDL   Lower Body Muscle Tone   Lower Body Muscle Tone  WDL   Strength Upper Body   Upper Body Strength  WDL   Upper Body Muscle Tone   Upper Body Muscle Tone  WDL   Neurological Concerns   Neurological Concerns Yes   Comments given possible autonomic disorder for syncope?    Coordination Upper Body   Coordination WDL   Coordination Lower Body    Coordination Lower Body   WDL   Balance Assessment   Sitting Balance (Static) Good   Sitting Balance (Dynamic) Fair +   Standing Balance (Static) Fair +   Standing Balance (Dynamic) Fair +   Weight Shift Sitting Good   Weight Shift Standing Fair   Comments initially pt requires close guarding upon standing and FWW use for safey concerns; during second stand pt required Mod A to return back to bed after standing as pt had a sycopal episode at EOB    Gait Analysis   Gait Level Of Assist Unable to Participate   Comments pt was able to march in place, however, had a syncopal event and was assisted back to supine; pt immediate was concious and was alert once supine    Bed Mobility    Supine to Sit Modified Independent   Sit to Supine Modified Independent   Scooting Modified Independent   Comments HOB elevated and rails up    Functional Mobility   Sit to Stand Supervised   Bed, Chair, Wheelchair Transfer Unable to Participate   Mobility EOB, sit<>stand, marching, back to bed    Comments upon second attempt at standing and marching pt passed out and required mod A to go back to supine   How much difficulty does the patient currently have...   Turning over in bed (including adjusting bedclothes, sheets and blankets)? 4   Sitting down on and standing up from a chair with arms (e.g., wheelchair, bedside commode, etc.) 4   Moving from lying on back to sitting on the side of the bed? 4   How much help from another person does the patient currently need...   Moving to and from a bed to a chair (including a wheelchair)? 1   Need to walk in a hospital room? 1   Climbing 3-5 steps with a railing? 1   6 clicks Mobility Score 15   Activity Tolerance   Sitting in Chair NT   Sitting Edge of Bed 8 mins    Standing 30 seconds only    Comments limited by syncopal episdoes and passing out    Edema / Skin Assessment   Edema / Skin  Not Assessed   Patient / Family Goals    Patient / Family Goal #1 Rehab setting    Short Term Goals    Short Term Goal # 1 pt will be  able to stand for 5 mins without any symptoms of dizziness/passing out w/fww use in 6tx for increased upright activity tolerance    Short Term Goal # 2 pt will transfer bed<>chair w/fww w/spv in 6tx for meals    Education Group   Education Provided Role of Physical Therapist   Role of Physical Therapist Patient Response Patient;Acceptance;Explanation;Verbal Demonstration   Problem List    Problems Decreased Activity Tolerance;Impaired Transfers;Impaired Ambulation;Impaired Balance   Anticipated Discharge Equipment and Recommendations   DC Equipment Recommendations Unable to determine at this time   Discharge Recommendations Recommend post-acute placement for additional physical therapy services prior to discharge home   Interdisciplinary Plan of Care Collaboration   IDT Collaboration with  Nursing   Patient Position at End of Therapy In Bed;Phone within Reach;Call Light within Reach;Tray Table within Reach;Bed Alarm On   Collaboration Comments aware of visit and concerns with syncopal episode    Session Information   Date / Session Number  6/25-1 (1/2, 7/1)

## 2021-06-25 NOTE — PROGRESS NOTES
Monitor Summary     Rhythm: SB/SR 57-96  Measurements: 0.24/0.08/0.42  ECTOPIES: rPVC        Normal Values  Rhythm SR  HR Range    Measurements 0.12-0.20 / 0.06-0.10  / 0.30-0.52

## 2021-06-25 NOTE — PROGRESS NOTES
Hospital Medicine Daily Progress Note    Date of Service  6/25/2021    Chief Complaint  55 y.o. female admitted 6/22/2021 with syncope    Hospital Course  55 y.o. female with a past medical history of chronic systolic heart failure, hypertension, depression, hypothyroidism, adrenal insufficiency and history of pulmonary embolism on anticoagulation with apixaban who presented 6/22/2021 with multiple episodes of transient loss of consciousness.  Patient reports that she had multiple episodes of syncope on the day of admission episodes lasting between 2-3 minutes.  All of them were preceded with a change in posture from lying in bed and standing or from sitting in the chair to start walking.  Patient denies having palpitations shortness of breath or chest pain.  There were no fecal or urinary incontinence.  There are no abnormal jerking movements or tongue biting.  Patient reports that her heart failure medications were recently increased and since then she has been having more frequent syncope attacks.    Interval Problem Update  6/24 - Pt significantly orthostatic this morning, dropped from 144 -->71 systolic with standing with significant rise in HR.  Discussed with patient - she has a medically complex situation, likely complicated by her adrenal insufficiency, need for cardiac meds given heart failure, and likely autonomic dysfunction. Having mild intermittent bradycardia that may be contributing as well. She is feeling better today but is not currently at her baseline.    6/25 - Bps didn't drop with orthostatics today, but HR shot up.  Still, some improvement as she is no longer having syncopal episodes. Will start trial off of IVFs.  Did not have SILVERIO hose placed, asked nursing to place.  Abdominal binder to remain in place while awake.    Consultants/Specialty  None    Code Status  Full Code    Disposition  Rehab if accepted     Review of Systems  Review of Systems   Constitutional: Negative for chills and  fever.   Respiratory: Negative for cough and shortness of breath.    Cardiovascular: Negative for chest pain and leg swelling.   Gastrointestinal: Negative for abdominal pain, nausea and vomiting.   Skin: Rash: Skin breakage.   Neurological: Positive for dizziness. Negative for focal weakness.   All other systems reviewed and are negative.       Physical Exam  Temp:  [36.3 °C (97.3 °F)-36.7 °C (98.1 °F)] 36.4 °C (97.6 °F)  Pulse:  [] 67  Resp:  [16-18] 18  BP: ()/(45-99) 108/72  SpO2:  [98 %-100 %] 99 %    Physical Exam  Vitals and nursing note reviewed.   Constitutional:       Appearance: Normal appearance. She is obese. She is not ill-appearing.   HENT:      Head: Normocephalic and atraumatic.      Mouth/Throat:      Mouth: Mucous membranes are dry.   Cardiovascular:      Rate and Rhythm: Regular rhythm. Bradycardia present.   Pulmonary:      Effort: Pulmonary effort is normal. No respiratory distress.      Breath sounds: Normal breath sounds. No wheezing or rales.   Abdominal:      General: Abdomen is flat. Bowel sounds are normal.      Palpations: Abdomen is soft.   Musculoskeletal:         General: No swelling.   Skin:     General: Skin is warm and dry.      Capillary Refill: Capillary refill takes 2 to 3 seconds.      Comments: Multiple skin tears     Neurological:      General: No focal deficit present.      Mental Status: She is alert and oriented to person, place, and time.      Cranial Nerves: No cranial nerve deficit.   Psychiatric:         Mood and Affect: Mood normal.         Behavior: Behavior normal.         Fluids    Intake/Output Summary (Last 24 hours) at 6/25/2021 1446  Last data filed at 6/25/2021 1400  Gross per 24 hour   Intake 3333.33 ml   Output 2550 ml   Net 783.33 ml       Laboratory  Recent Labs     06/23/21  1113 06/24/21  0252 06/25/21  0516   WBC 9.1 6.4 7.4   RBC 3.87* 3.92* 4.66   HEMOGLOBIN 11.7* 11.7* 13.7   HEMATOCRIT 37.4 38.1 45.5   MCV 96.6 97.2 97.6   MCH 30.2 29.8  29.4   MCHC 31.3* 30.7* 30.1*   RDW 56.6* 57.1* 58.4*   PLATELETCT 279 285 305   MPV 10.9 11.1 11.0     Recent Labs     06/23/21  1113 06/24/21  0252 06/25/21  0516   SODIUM 137 138 136   POTASSIUM 4.8 4.3 4.3   CHLORIDE 105 109 107   CO2 21 21 18*   GLUCOSE 126* 121* 104*   BUN 26* 23* 18   CREATININE 0.94 0.88 0.61   CALCIUM 8.0* 7.7* 8.1*                   Imaging  CT-HEAD W/O   Final Result      No acute intracranial abnormality.      DX-CHEST-PORTABLE (1 VIEW)   Final Result      No acute cardiopulmonary abnormality identified.           Assessment/Plan  * Syncope due to orthostatic hypotension- (present on admission)  Assessment & Plan  - Significantly multifactorial - patient has been admitted numerous times with orthostasis and syncope.  I think she most likely has an autonomic disorder contributing to her orthostasis, that is worsened by her known adrenal insufficiency and need for ARBs/BB due to a history of systolic heart failure  - Her goal will likely be to utilize the lowest dose of Coreg and Losartan that will maintain a good cardiac output for her while minimizing orthostasis  - Avoid volume depletion - she is on 40mg of Lasix daily but has a recovered EF - she likely does not need such a large dose. Will consider 20mg daily or PRN for peripheral edema at discharge   - Dr Pryor at [facility] specializes in autonomic disorders - pt already sees an NP there for migraines, we will try to get her an appt with him to evaluate for autonomic dysfunction at discharge. If not, there is an autonomic disorder specialist at [place] that we can try to get her in with  - In the meantime, added Florinef to her hydrocortisone to add some mineralocorticoid activity.   - Added an abdominal binder and SILVERIO hose with good effect   - Looking at getting pt to rehab    History of pulmonary embolism- (present on admission)  Assessment & Plan  Diagnosed last May  Resumed home anticoagulation with apixaban.    MRSA  (methicillin resistant Staphylococcus aureus) sinus infection- (present on admission)  Assessment & Plan  Resume home infusions of Eravacycline Dihydrochloride    Adrenal insufficiency (Valentín's disease) ([facility])- (present on admission)  Assessment & Plan  Resumed home dose hydrocortisone, sees Endocrine as an outpatient. Added Florinef for mineralocorticoid activity.     MARY LOU (acute kidney injury) ([facility])  Assessment & Plan  Mostly due to dehydration secondary to polypharmacy/overdiuresis  Stop IVFs today  Avoid nephrotoxins, monitor inputs and outputs     Hypertension- (present on admission)  Assessment & Plan  Difficult to manage with her orthostasis  Reduced dose of carvedilol and losartan with hold parameters.  We will hold home furosemide, doxazosin and amiloride at this point    Hypothyroidism- (present on admission)  Assessment & Plan  Resumed home levothyroxine - a little overcorrected TSH on last labs, but free T4 was normal, will have pt recheck with her PCP in three weeks    CKD (chronic kidney disease) stage 3, GFR 30-59 ml/min ([facility])- (present on admission)  Assessment & Plan  Avoid nephrotoxins as much as possible, renally dose medications, monitor inputs and outputs     Depression- (present on admission)  Assessment & Plan  Resumed home amitriptyline and duloxetine  Antidepressants can be associated with orthostasis, will discuss with patient whether she is comfortable tapering those as an outpatient     Chronic systolic heart failure ([facility])- (present on admission)  Assessment & Plan  - Not currently in exacerbation.  - Recent echo reviewed, shows improvement in ejection fraction up to 70% compared to 20% back in November 2019 (had Takotsubo's)  - Decreased home carvedilol  given her hypotension and syncope.  - Reduced home dose losartan given her hypotension and syncope.   - Hold home furosemide given her dehydration.  Daily strict input and output  Daily standing weights.    Gastroesophageal reflux  disease without esophagitis- (present on admission)  Assessment & Plan  Continue home omeprazole       VTE prophylaxis: Eliquis

## 2021-06-25 NOTE — CARE PLAN
The patient is Stable - Low risk of patient condition declining or worsening    Shift Goals  Clinical Goals: orthostatic BP  Patient Goals: Rest    Progress made toward(s) clinical / shift goals:    Problem: Knowledge Deficit - Standard  Goal: Patient and family/care givers will demonstrate understanding of plan of care, disease process/condition, diagnostic tests and medications  Outcome: Progressing   Discussed POC with pt, spoke about plans to take orthostatic BP if her BP improved. Pt verbalized understanding.   Problem: Syncope Management  Goal: Patient's vital signs will be with within normal range or improve  Outcome: Progressing  Pt has remained free of falls, pt has not reported and dizziness or light headedness BP has improved through out shift. . With BP in the one-teens and higher.        Patient is not progressing towards the following goals:

## 2021-06-26 PROCEDURE — 97110 THERAPEUTIC EXERCISES: CPT

## 2021-06-26 PROCEDURE — 99232 SBSQ HOSP IP/OBS MODERATE 35: CPT | Performed by: FAMILY MEDICINE

## 2021-06-26 PROCEDURE — A9270 NON-COVERED ITEM OR SERVICE: HCPCS | Performed by: INTERNAL MEDICINE

## 2021-06-26 PROCEDURE — 97530 THERAPEUTIC ACTIVITIES: CPT

## 2021-06-26 PROCEDURE — 700102 HCHG RX REV CODE 250 W/ 637 OVERRIDE(OP): Performed by: INTERNAL MEDICINE

## 2021-06-26 PROCEDURE — 700102 HCHG RX REV CODE 250 W/ 637 OVERRIDE(OP): Performed by: HOSPITALIST

## 2021-06-26 PROCEDURE — A9270 NON-COVERED ITEM OR SERVICE: HCPCS | Performed by: FAMILY MEDICINE

## 2021-06-26 PROCEDURE — 770020 HCHG ROOM/CARE - TELE (206)

## 2021-06-26 PROCEDURE — A9270 NON-COVERED ITEM OR SERVICE: HCPCS | Performed by: HOSPITALIST

## 2021-06-26 PROCEDURE — 700102 HCHG RX REV CODE 250 W/ 637 OVERRIDE(OP): Performed by: FAMILY MEDICINE

## 2021-06-26 RX ORDER — FLUDROCORTISONE ACETATE 0.1 MG/1
0.1 TABLET ORAL EVERY MORNING
Qty: 30 TABLET | Refills: 0 | Status: ON HOLD
Start: 2021-06-27 | End: 2021-07-09

## 2021-06-26 RX ORDER — LOSARTAN POTASSIUM 25 MG/1
25 TABLET ORAL DAILY
Qty: 30 TABLET | Refills: 0 | Status: ON HOLD
Start: 2021-06-27 | End: 2021-07-09

## 2021-06-26 RX ORDER — CARVEDILOL 3.12 MG/1
3.12 TABLET ORAL 2 TIMES DAILY WITH MEALS
Qty: 60 TABLET | Refills: 0 | Status: ON HOLD
Start: 2021-06-26 | End: 2021-07-09

## 2021-06-26 RX ORDER — NYSTATIN 100000 [USP'U]/G
POWDER TOPICAL
Qty: 15 G | Refills: 0 | Status: ON HOLD
Start: 2021-06-26 | End: 2021-07-09

## 2021-06-26 RX ORDER — FUROSEMIDE 20 MG/1
20 TABLET ORAL PRN
Qty: 30 TABLET | Refills: 0 | Status: ON HOLD
Start: 2021-06-26 | End: 2021-07-09

## 2021-06-26 RX ADMIN — COLESEVELAM HYDROCHLORIDE 625 MG: 625 TABLET, FILM COATED ORAL at 07:47

## 2021-06-26 RX ADMIN — NYSTATIN: 100000 POWDER TOPICAL at 17:45

## 2021-06-26 RX ADMIN — MONTELUKAST 10 MG: 10 TABLET, FILM COATED ORAL at 17:45

## 2021-06-26 RX ADMIN — ACETAMINOPHEN 650 MG: 325 TABLET, FILM COATED ORAL at 20:21

## 2021-06-26 RX ADMIN — AMITRIPTYLINE HYDROCHLORIDE 10 MG: 10 TABLET, FILM COATED ORAL at 05:37

## 2021-06-26 RX ADMIN — LEVOTHYROXINE SODIUM 75 MCG: 0.07 TABLET ORAL at 05:37

## 2021-06-26 RX ADMIN — GABAPENTIN 400 MG: 400 CAPSULE ORAL at 17:45

## 2021-06-26 RX ADMIN — HYDROCORTISONE 10 MG: 10 TABLET ORAL at 05:38

## 2021-06-26 RX ADMIN — DULOXETINE HYDROCHLORIDE 60 MG: 30 CAPSULE, DELAYED RELEASE ORAL at 20:20

## 2021-06-26 RX ADMIN — Medication 2000 UNITS: at 05:37

## 2021-06-26 RX ADMIN — OMEPRAZOLE 20 MG: 20 CAPSULE, DELAYED RELEASE ORAL at 05:38

## 2021-06-26 RX ADMIN — COLESEVELAM HYDROCHLORIDE 625 MG: 625 TABLET, FILM COATED ORAL at 16:04

## 2021-06-26 RX ADMIN — NYSTATIN: 100000 POWDER TOPICAL at 05:43

## 2021-06-26 RX ADMIN — APIXABAN 5 MG: 5 TABLET, FILM COATED ORAL at 05:37

## 2021-06-26 RX ADMIN — GABAPENTIN 400 MG: 400 CAPSULE ORAL at 05:37

## 2021-06-26 RX ADMIN — CARVEDILOL 3.12 MG: 3.12 TABLET, FILM COATED ORAL at 17:46

## 2021-06-26 RX ADMIN — APIXABAN 5 MG: 5 TABLET, FILM COATED ORAL at 17:45

## 2021-06-26 RX ADMIN — LIOTHYRONINE SODIUM 25 MCG: 5 TABLET ORAL at 20:19

## 2021-06-26 RX ADMIN — HYDROCORTISONE 10 MG: 10 TABLET ORAL at 17:45

## 2021-06-26 RX ADMIN — LOSARTAN POTASSIUM 25 MG: 25 TABLET, FILM COATED ORAL at 05:36

## 2021-06-26 RX ADMIN — FLUDROCORTISONE ACETATE 0.1 MG: 0.1 TABLET ORAL at 05:38

## 2021-06-26 RX ADMIN — CALCITONIN SALMON 200 UNITS: 200 SPRAY, METERED NASAL at 20:22

## 2021-06-26 RX ADMIN — COLESEVELAM HYDROCHLORIDE 625 MG: 625 TABLET, FILM COATED ORAL at 20:21

## 2021-06-26 ASSESSMENT — PAIN DESCRIPTION - PAIN TYPE: TYPE: ACUTE PAIN

## 2021-06-26 ASSESSMENT — GAIT ASSESSMENTS
ASSISTIVE DEVICE: FRONT WHEEL WALKER
GAIT LEVEL OF ASSIST: UNABLE TO PARTICIPATE

## 2021-06-26 ASSESSMENT — FIBROSIS 4 INDEX: FIB4 SCORE: 1.33

## 2021-06-26 NOTE — DISCHARGE PLANNING
Placed Voalte message to Sla Sawyer at [facility] regarding patient acceptance and transport time for today as per Dr Escalante message about acceptance to rehab.

## 2021-06-26 NOTE — DISCHARGE INSTRUCTIONS
Diet: Diet Order Diet: Cardiac  Activity: As tolerated  Follow Up: Please seek referral to Dr Pryor of [facility] or [facility] after dc from Rehab  Disposition:Rehab  Diagnosis: Syncope due to orthostatic hypotension    Follow up with your Primary Care Provider Madisyn Mccullough M.D. as scheduled or sooner if your symptoms persist or worsen.  Return to Emergency Room for severe chest pain, shortness of breath, signs of a stroke, or any other emergencies.      Discharge Instructions    Discharged to other by medical transportation with escort. Discharged via wheelchair, hospital escort: Yes.  Special equipment needed: Not Applicable    Be sure to schedule a follow-up appointment with your primary care doctor or any specialists as instructed.     Discharge Plan:   Diet Plan: Discussed  Activity Level: Discussed  Confirmed Follow up Appointment: Patient to Call and Schedule Appointment  Confirmed Symptoms Management: Discussed  Medication Reconciliation Updated: Yes    I understand that a diet low in cholesterol, fat, and sodium is recommended for good health. Unless I have been given specific instructions below for another diet, I accept this instruction as my diet prescription.   Other diet: Cardiac    Special Instructions: None    · Is patient discharged on Warfarin / Coumadin?   No     Depression / Suicide Risk    As you are discharged from this [facility] facility, it is important to learn how to keep safe from harming yourself.    Recognize the warning signs:  · Abrupt changes in personality, positive or negative- including increase in energy   · Giving away possessions  · Change in eating patterns- significant weight changes-  positive or negative  · Change in sleeping patterns- unable to sleep or sleeping all the time   · Unwillingness or inability to communicate  · Depression  · Unusual sadness, discouragement and loneliness  · Talk of wanting to die  · Neglect of personal  appearance   · Rebelliousness- reckless behavior  · Withdrawal from people/activities they love  · Confusion- inability to concentrate     If you or a loved one observes any of these behaviors or has concerns about self-harm, here's what you can do:  · Talk about it- your feelings and reasons for harming yourself  · Remove any means that you might use to hurt yourself (examples: pills, rope, extension cords, firearm)  · Get professional help from the community (Mental Health, Substance Abuse, psychological counseling)  · Do not be alone:Call your Safe Contact- someone whom you trust who will be there for you.  · Call your local CRISIS HOTLINE 444-9291 or 378-179-8431  · Call your local Children's Mobile Crisis Response Team [place] (054) 218-3111 or www.[facility]  · Call the toll free National Suicide Prevention Hotlines   · National Suicide Prevention Lifeline 800-732-ZHRK (4977)  · [employer] Line Network 800-SUICIDE (479-3899)      Fludrocortisone tablets  What is this medicine?  FLUDROCORTISONE (floo droe KOR ti sone) is a corticosteroid. It is used to treat Catahoula's disease and to treat a salt losing condition called adrenogenital syndrome.  This medicine may be used for other purposes; ask your health care provider or pharmacist if you have questions.  COMMON BRAND NAME(S): Giana  What should I tell my health care provider before I take this medicine?  They need to know if you have any of these conditions:  · Cushing's syndrome  · diabetes  · heart problems or disease  · high blood pressure  · infection like herpes, measles, tuberculosis, or chickenpox  · liver disease  · myasthenia gravis  · osteoporosis  · stomach, ulcer or intestine disease including colitis and diverticulitis  · thyroid problem  · an unusual or allergic reaction to fludrocortisone, corticosteroids, other medicines, lactose, foods, dyes, or preservatives  · pregnant or trying to get pregnant  · breast-feeding  How should  I use this medicine?  Take this medicine by mouth with a glass of water. Follow the directions on the prescription label. Take it with food or milk to avoid stomach upset. If you are taking this medicine once a day, take it in the morning. Do not take more medicine than you are told to take. Do not suddenly stop taking your medicine because you may develop a severe reaction. Your doctor will tell you how much medicine to take. If your doctor wants you to stop the medicine, the dose may be slowly lowered over time to avoid any side effects.  Talk to your pediatrician regarding the use of this medicine in children. Special care may be needed.  Patients over 65 years old may have a stronger reaction and need a smaller dose.  Overdosage: If you think you have taken too much of this medicine contact a poison control center or emergency room at once.  NOTE: This medicine is only for you. Do not share this medicine with others.  What if I miss a dose?  If you miss a dose, take it as soon as you can. If it is almost time for your next dose, take only that dose. Do not take double or extra doses.  What may interact with this medicine?  Do not take this medicine with any of the following medications:  · mifepristone, RU-486  This medicine may also interact with the following medications:  · amphotericin B  · aspirin and aspirin-like drugs  · barbiturates like phenobarbital  · digoxin  · diuretics  · female hormones, like estrogens or progestins and birth control pills  · male hormones  · medicines for diabetes like insulin  · medicines that treat or prevent blood clots like warfarin  · phenytoin  · rifampin  · vaccines  This list may not describe all possible interactions. Give your health care provider a list of all the medicines, herbs, non-prescription drugs, or dietary supplements you use. Also tell them if you smoke, drink alcohol, or use illegal drugs. Some items may interact with your medicine.  What should I watch for  while using this medicine?  Visit your doctor or health care professional for regular checks on your progress. If you are taking this medicine over a prolonged period, carry an identification card with your name and address, the type and dose of your medicine, and your doctor's name and address.  This medicine may increase your risk of getting an infection. Stay away from people who are sick. Tell your doctor or health care professional if you are around anyone with measles or chickenpox.  If you are going to have surgery, tell your doctor or health care professional that you have taken this medicine within the last twelve months.  Ask your doctor or health care professional about your diet. You may need to lower the amount of salt you eat.  This medicine may increase blood sugar. Ask your healthcare provider if changes in diet or medicines are needed if you have diabetes.  What side effects may I notice from receiving this medicine?  Side effects that you should report to your doctor or health care professional as soon as possible:  · mental depression, mood swings, mistaken feelings of self importance or of being mistreated  ·   signs and symptoms of high blood sugar such as being more thirsty or hungry or having to urinate more than normal. You may also feel very tired or have blurry vision.  · sudden weight gain  · swelling of the feet or lower legs  Side effects that usually do not require medical attention (report to your doctor or health care professional if they continue or are bothersome):  · dizziness  · headache  · loss of appetite  · nausea, vomiting  · trouble sleeping  This list may not describe all possible side effects. Call your doctor for medical advice about side effects. You may report side effects to FDA at 2-984-DSU-3888.  Where should I keep my medicine?  Keep out of the reach of children.  Store at room temperature between 15 and 30 degrees C (59 and 86 degrees F). Protect from excessive  heat. Throw away any unused medicine after the expiration date.  NOTE: This sheet is a summary. It may not cover all possible information. If you have questions about this medicine, talk to your doctor, pharmacist, or health care provider.  © 2020 [employer] (2019-09-18 11:12:19)

## 2021-06-26 NOTE — THERAPY
Physical Therapy   Daily Treatment     Patient Name: Donna Isaac  Age:  55 y.o., Sex:  female  Medical Record #: [id number]  Today's Date: 6/26/2021     Precautions: Fall Risk    Assessment    No episodes of passing out today but did not ambulate. Pt Bp 142/95 lying 129/108 standing.Pt is not safe to ambulate or for home secondary to repeat loss of consciousness    Plan    Continue current treatment plan.    DC Equipment Recommendations: (P) Unable to determine at this time  Discharge Recommendations: (P) Recommend post-acute placement for additional physical therapy services prior to discharge home     06/26/21 1500   Total Time Spent   Total Time Spent (Mins) 25   Charge Group   PT Therapeutic Activities 1   PT Therapeutic Exercise 1   Balance   Sitting Balance (Static) Good   Sitting Balance (Dynamic) Good   Standing Balance (Static) Fair +   Standing Balance (Dynamic) Fair +   Weight Shift Sitting Good   Weight Shift Standing Fair   Gait Analysis   Gait Level Of Assist Unable to Participate   Assistive Device Front Wheel Walker   Bed Mobility    Supine to Sit Modified Independent   Sit to Supine Modified Independent   Scooting Modified Independent   Functional Mobility   Sit to Stand Supervised   Bed, Chair, Wheelchair Transfer Supervised   Activity Tolerance   Sitting Edge of Bed 10   Standing 5   Short Term Goals    Short Term Goal # 1 pt will be able to stand for 5 mins without any symptoms of dizziness/passing out w/fww use in 6tx for increased upright activity tolerance    Goal Outcome # 1 Progressing as expected   Short Term Goal # 2 pt will transfer bed<>chair w/fww w/spv in 6tx for meals    Goal Outcome # 2 Progressing as expected   Short Term Goal # 3 Pt to be Min A x 2 stairs in 4 Vso can get into house   Goal Outcome # 3 Goal not met   Anticipated Discharge Equipment and Recommendations   DC Equipment Recommendations Unable to determine at this time   Discharge Recommendations Recommend  post-acute placement for additional physical therapy services prior to discharge home   Interdisciplinary Plan of Care Collaboration   IDT Collaboration with  Nursing   Session Information   Date / Session Number  6/26-2 2/27 7/1

## 2021-06-26 NOTE — CARE PLAN
The patient is {Patient Stability:[id number]:::0}    Shift Goals  Clinical Goals: Patient to get sleep and having no syncopal event this shift.  Patient Goals: Rest    Progress made toward(s) clinical / shift goals:  ***    Patient is not progressing towards the following goals:      Problem: Fall Risk  Goal: Patient will remain free from falls  Outcome: Not Progressing     Problem: Syncope Management  Goal: Patient's signs and symptoms of syncope will be assessed and managed  Outcome: Not Progressing

## 2021-06-26 NOTE — DISCHARGE PLANNING
Anticipated Discharge Disposition: TBD    Action: Acute rehab consult pending. Messaged [facility] via voalte at 0947 to inquire if consult would be completed today- no response    VM left for [facility] @ [phone]    Barriers to Discharge: None    Plan: Care coordination will continue to follow and assist with discharge planning

## 2021-06-26 NOTE — CARE PLAN
The patient is Watcher - Medium risk of patient condition declining or worsening    Shift Goals  Clinical Goals: Patient to get sleep and having no syncopal event this shift.  Patient Goals: Rest    Progress made toward(s) clinical / shift goals: Patient has remained without syncopal events and has been sleeping majority of this shift.    Patient is not progressing towards the following goals:    Problem: Fall Risk  Goal: Patient will remain free from falls  Outcome: Not Progressing     Problem: Syncope Management  Goal: Patient's signs and symptoms of syncope will be assessed and managed  Outcome: Not Progressing    Patient reports continuing to have syncopal events when attempting to stand or walk. Current plan is to keep patient in bed tonight until patient can discharge to rehab and consult with an autonomic dysfunction specialist.

## 2021-06-26 NOTE — CARE PLAN
The patient is Watcher - Medium risk of patient condition declining or worsening    Shift Goals  Clinical Goals: prevent syncopal episodes  Patient Goals: discharge to [facility]    Progress made toward(s) clinical / shift goals:  ,monitoring BP and tele strips to be aware of any changes that may be associated w/ syncopal episodes; work with MD and SW for acceptance to [facility]    Problem: Syncope Management  Goal: Patient's risk for medication side effects that could worsen signs and symptoms of syncope will be decreased  Outcome: Progressing  Note: Continuously monitoring BP prior to administering anti-hypertensives. Encouraging pt to change positions slowly.      Problem: Self Care  Goal: Patient will have the ability to perform ADLs independently or with assistance (bathe, groom, dress, toilet and feed)  Outcome: Progressing  Note: Pt able to perform self care activities with some assistance from staff d/t orthostatic hypotension. Pt will d/c to a rehab facility. Continuously encouraging pt independence in ADLs

## 2021-06-26 NOTE — PROGRESS NOTES
Telemetry Shift Summary     Rhythm SB, SR with first degree AVB  HR Range 50-84  Ectopy  rare PVCs  Measurements .22/.10/.46              Normal Values  Rhythm SR  HR Range    Measurements 0.12-0.20 / 0.06-0.10  / 0.30-0.52

## 2021-06-26 NOTE — DISCHARGE SUMMARY
Discharge Summary    CHIEF COMPLAINT ON ADMISSION  Chief Complaint   Patient presents with   • Syncope     Pt has hx of addisons and hypotension. Pt had 6 assisted syncopal episodes by  today. No trauma, pt is on eliquis for previous dx of PE   • Hypotension       Reason for Admission  EMS     Admission Date  6/22/2021    CODE STATUS  Full Code    HPI & HOSPITAL COURSE  This is a 55 y.o. female here with orthostatic syncope.  She has an extensive medical history significant for recovered systolic CHF, HTN, depression, hypothyroidism, adrenal insufficiency, PTE and a chronic sinus infection for which she is on Xerava via a PICC through [facility]. She has had multiple hospitalizations for orthostatic syncope which is significantly multifactorial -  I think she most likely has an autonomic disorder contributing to her orthostasis, that is worsened by her known adrenal insufficiency and need for ARBs/BB due to a history of systolic heart failure. Her goal will likely be to utilize the lowest dose of Coreg and Losartan that will maintain a good cardiac output for her while minimizing orthostasis, and avoid volume depletion - as such, we have changed her Lasix to PRN as her EF has recovered.      We added Florinef to her hydrocortisone for her adrenal insufficiency, and added an abdominal binder and SILVERIO hose to help with the orthostasis, all of which seemed to make a difference.  She will be going to rehab from here for her orthostasis, and she and I discussed pursuing a referral to either Dr Pryor or Colt for further evaluation of her dysautonomia.        Therefore, she is discharged in fair and stable condition to an inpatient rehabilitation hospital.    The patient met 2-midnight criteria for an inpatient stay at the time of discharge.    Discharge Date  6/26/21    FOLLOW UP ITEMS POST DISCHARGE  None    DISCHARGE DIAGNOSES  Principal Problem:    Syncope due to orthostatic hypotension POA: Yes       Overview: Overview:       Likely due to hypotension  Active Problems:    Gastroesophageal reflux disease without esophagitis POA: Yes    Chronic systolic heart failure (HCC) POA: Yes    Depression POA: Yes    CKD (chronic kidney disease) stage 3, GFR 30-59 ml/min ([facility]) POA: Yes    Hypothyroidism POA: Yes    Hypertension POA: Yes    MARY LOU (acute kidney injury) ([facility]) POA: Unknown    Adrenal insufficiency (Brockport's disease) ([facility]) POA: Yes    MRSA (methicillin resistant Staphylococcus aureus) sinus infection POA: Yes    History of pulmonary embolism POA: Yes  Resolved Problems:    * No resolved hospital problems. *      FOLLOW UP  Future Appointments   Date Time Provider Department Center   6/29/2021  3:00 PM [facility]   6/29/2021  3:30 PM William Srinivasan M.D. [facility]   7/6/2021  2:00 [facility]   7/15/2021  9:40 AM KONSTANTIN FraserP.RJelaniNJelani R[facility]N None   9/13/2021  8:15 AM [facility] EXAM 12 ECHO [facility]   9/20/2021  8:00 AM Eligio Torrse M.D. R[facility]B None   9/21/2021 11:20 AM Michael J Bloch, M.D. VMED None   10/12/2021  9:00 AM KONSTANTIN FraserP.R.JEISON RMGN None     Madiysn Mccullough M.D.  [address]  [facility] [address]  [facility] [address]  [phone]      After dc from rehab       MEDICATIONS ON DISCHARGE     Medication List      START taking these medications      Instructions   fludrocortisone 0.1 MG Tabs  Start taking on: June 27, 2021  Commonly known as: FLORINEF   Take 1 tablet by mouth every morning.  Dose: 0.1 mg     nystatin powder  Commonly known as: MYCOSTATIN   Apply to affected area        CHANGE how you take these medications      Instructions   amitriptyline 10 MG Tabs  What changed: See the new instructions.  Commonly known as: ELAVIL   TAKE 2 TABLETS BY MOUTH EVERY NIGHT AT BEDTIME, AND 1 TABLET BY MOUTH EVERY MORNING     carvedilol 3.125 MG Tabs  What changed:   · medication strength  · how much to take  Commonly known as: COREG   Take 1  tablet by mouth 2 times a day with meals.  Dose: 3.125 mg     furosemide 20 MG Tabs  What changed:   · when to take this  · reasons to take this  Commonly known as: LASIX   Take 1 tablet by mouth as needed (Edema).  Dose: 20 mg     losartan 25 MG Tabs  Start taking on: June 27, 2021  What changed:   · medication strength  · how much to take  Commonly known as: COZAAR   Take 1 tablet by mouth every day.  Dose: 25 mg        CONTINUE taking these medications      Instructions   BIOTIN PO   Take 500 mcg by mouth every day.  Dose: 500 mcg     calcitonin (salmon) 200 UNIT/ACT Soln  Commonly known as: MIACALCIN   Spray 1 Spray in nose every bedtime.  Dose: 1 Spray     CALCIUM-MAGNESIUM-ZINC PO   Take 1 tablet by mouth every day.  Dose: 1 tablet     colesevelam 625 MG Tabs  Commonly known as: WELCHOL   Take 625 mg by mouth in the morning, at noon, and at bedtime.  Dose: 625 mg     cyanocobalamin 1000 MCG/ML Soln  Commonly known as: VITAMIN B-12   Inject 1,000 mcg into the shoulder, thigh, or buttocks every 7 days. On Tue  Dose: 1,000 mcg     D3 2000 UNIT Tabs  Generic drug: Cholecalciferol   Take 2,000 Units by mouth every day.  Dose: 2,000 Units     Dexilant 60 MG Cpdr delayed-release capsule  Generic drug: Dexlansoprazole   Take 60 mg by mouth every evening.  Dose: 60 mg     DULoxetine 60 MG Cpep delayed-release capsule  Commonly known as: CYMBALTA   Take 60 mg by mouth every bedtime.  Dose: 60 mg     Eliquis 5mg Tabs  Generic drug: apixaban   Take 5 mg by mouth 2 times a day.  Dose: 5 mg     Erenumab 70 MG/ML Soaj  Commonly known as: AIMOVIG   Inject 140 mg under the skin Q30 DAYS.  Dose: 140 mg     estradiol 0.5 MG tablet  Commonly known as: ESTRACE   Take 0.5 mg by mouth every morning.  Dose: 0.5 mg     famotidine 40 MG Tabs  Commonly known as: PEPCID   Take 40 mg by mouth at bedtime.  Dose: 40 mg     Frova 2.5 MG Tabs  Generic drug: Frovatriptan Succinate   Take 2.5 mg by mouth as needed (For migraines). MR x 1 in 1  hour if no relief  then must wait 8 hours for another dose  Dose: 2.5 mg     gabapentin 400 MG Caps  Commonly known as: NEURONTIN   Take 1 Cap by mouth 3 times a day.  Dose: 400 mg     hydrocortisone 10 MG Tabs  Commonly known as: CORTEF   Take 1 tablet by mouth 2 times a day.  Dose: 10 mg     KLS Aller-Carmelita 10 MG Tabs  Generic drug: cetirizine   Take 20 mg by mouth 2 times a day.  Dose: 20 mg     levalbuterol 45 MCG/ACT inhaler  Commonly known as: XOPENEX HFA   Inhale 1-2 Puffs every four hours as needed for Shortness of Breath.  Dose: 1-2 Puff     levothyroxine 75 MCG Tabs  Commonly known as: SYNTHROID   Take 75 mcg by mouth every morning on an empty stomach.  Dose: 75 mcg     liothyronine 25 MCG Tabs  Commonly known as: CYTOMEL   Take 25 mcg by mouth every bedtime.  Dose: 25 mcg     Magnesium 400 MG Tabs   Take 400 mg by mouth every day.  Dose: 400 mg     montelukast 10 MG Tabs  Commonly known as: SINGULAIR   Take 10 mg by mouth every evening.  Dose: 10 mg     multivitamin Tabs   Take 1 tablet by mouth every day.  Dose: 1 tablet     PROBIOTIC DAILY PO   Take 1 capsule by mouth every day.  Dose: 1 capsule     VITAMIN K PO   Take 1 tablet by mouth every day.  Dose: 1 tablet     Xerava 100 MG Solr  Generic drug: Eravacycline Dihydrochloride   Infuse 100 mg into a venous catheter 2 times a day. Pt started 6/10/2021 for 6 weeks  Dose: 100 mg     Zomacton 10 MG Solr  Generic drug: Somatropin   Inject 0.3 mg under the skin every evening.  Dose: 0.3 mg        STOP taking these medications    AMILoride 5 MG Tabs  Commonly known as: MIDAMOR     aspirin 81 MG Chew chewable tablet  Commonly known as: ASA     doxazosin 2 MG Tabs  Commonly known as: CARDURA     meloxicam 15 MG tablet  Commonly known as: MOBIC     pantoprazole 40 MG Tbec  Commonly known as: PROTONIX     potassium chloride SA 20 MEQ Tbcr  Commonly known as: Kdur     tizanidine 4 MG Tabs  Commonly known as: ZANAFLEX            Allergies  Allergies   Allergen  "Reactions   • Ancef [Cefazolin] Hives and Shortness of Breath   • Bactrim [Sulfamethoxazole W-Trimethoprim] Shortness of Breath   • Bee Venom Anaphylaxis   • Buprenorphine Anaphylaxis     Plus hives and shortness of breath   • Clindamycin Hives and Shortness of Breath   • Contrast Media With Iodine [Iodine] Hives and Swelling   • Doxycycline Hives and Shortness of Breath   • Econazole Anaphylaxis   • Flagyl  [Metronidazole] Hives and Unspecified   • Floxin [Ofloxacin] Anaphylaxis, Shortness of Breath and Swelling     Cause throat swelling and difficulty breathing   • Gadolinium Derivatives Hives and Swelling     Throat swelling   • Hydrocodone-Acetaminophen Hives and Shortness of Breath   • Iodine Shortness of Breath and Anaphylaxis     Throat and tongue swelling with IV contrast   • Keflex Shortness of Breath     And hives   • Levofloxacin Shortness of Breath     And anaphylaxis reported   • Morphine Anaphylaxis   • Naloxone Hives     \"hives, SOB\"   • Nitrofurantoin Shortness of Breath     ...and hives     • Norco [Apap-Fd&C Yellow #10 [place]-Hydrocodone] Shortness of Breath     ...and hives     • Nyquil Hives and Shortness of Breath   • Oxycodone Shortness of Breath     ...and hives     • Paricalcitol Hives, Shortness of Breath and Unspecified     CHEST PAIN   • Penicillins Shortness of Breath     ...and hives     • Tape Contact Dermatitis and Swelling     Blisters, clear tegaderm ok.. No steristrips.  Other reaction(s): Other, Unknown   • Tramadol Hives   • Vicks Dayquil Cold Hives and Shortness of Breath   • Azithromycin Hives and Shortness of Breath     Pt had Hives also   • Bextra [Valdecoxib] Rash     \"Skin burn and peeling.\"   • Linezolid Rash     Rash all over body   • Codeine Shortness of Breath and Rash     Rash & SOB   • Sulfa Drugs      Other reaction(s): Hives   • Tygacil [Tigecycline]      itching       DIET  Orders Placed This Encounter   Procedures   • Diet Order Diet: Cardiac     Standing Status:  "  Standing     Number of Occurrences:   1     Order Specific Question:   Diet:     Answer:   Cardiac [6]       ACTIVITY  As tolerated and directed by rehab.  Weight bearing as tolerated    CONSULTATIONS  None    PROCEDURES  None    LABORATORY  Lab Results   Component Value Date    SODIUM 136 06/25/2021    POTASSIUM 4.3 06/25/2021    CHLORIDE 107 06/25/2021    CO2 18 (L) 06/25/2021    GLUCOSE 104 (H) 06/25/2021    BUN 18 06/25/2021    CREATININE 0.61 06/25/2021        Lab Results   Component Value Date    WBC 7.4 06/25/2021    HEMOGLOBIN 13.7 06/25/2021    HEMATOCRIT 45.5 06/25/2021    PLATELETCT 305 06/25/2021        Total time of the discharge process exceeds 34 minutes.

## 2021-06-26 NOTE — DISCHARGE PLANNING
TCC/ Rehab  Received message from SCAR Hawthorne  re status of transfer as she received info pt was acepted to Rehab today at 11am today.     After reviewing, It appears acceptance was related to another pt. This referral will be forwarded to PMR for chart review tomorrow.     Dr. Biggs, SCAR Hawthorne and Reema updated.

## 2021-06-26 NOTE — PROGRESS NOTES
Bedside report received from Giulia ROBERTSON. Patient is in bed and stable. Bed and strip alarm is on. A&Ox4. Bed locked and in lowest position, upper side rails up, call light in reach, whiteboard updated. POC discussed and pt verbalizes understanding.

## 2021-06-27 PROCEDURE — 700102 HCHG RX REV CODE 250 W/ 637 OVERRIDE(OP): Performed by: HOSPITALIST

## 2021-06-27 PROCEDURE — 99358 PROLONG SERVICE W/O CONTACT: CPT | Performed by: PHYSICAL MEDICINE & REHABILITATION

## 2021-06-27 PROCEDURE — 99232 SBSQ HOSP IP/OBS MODERATE 35: CPT | Performed by: FAMILY MEDICINE

## 2021-06-27 PROCEDURE — A9270 NON-COVERED ITEM OR SERVICE: HCPCS | Performed by: FAMILY MEDICINE

## 2021-06-27 PROCEDURE — 770020 HCHG ROOM/CARE - TELE (206)

## 2021-06-27 PROCEDURE — 700102 HCHG RX REV CODE 250 W/ 637 OVERRIDE(OP): Performed by: FAMILY MEDICINE

## 2021-06-27 PROCEDURE — A9270 NON-COVERED ITEM OR SERVICE: HCPCS | Performed by: HOSPITALIST

## 2021-06-27 RX ADMIN — HYDROCORTISONE 10 MG: 10 TABLET ORAL at 17:32

## 2021-06-27 RX ADMIN — OMEPRAZOLE 20 MG: 20 CAPSULE, DELAYED RELEASE ORAL at 06:20

## 2021-06-27 RX ADMIN — Medication 2000 UNITS: at 06:20

## 2021-06-27 RX ADMIN — CARVEDILOL 3.12 MG: 3.12 TABLET, FILM COATED ORAL at 08:33

## 2021-06-27 RX ADMIN — HYDROCORTISONE 10 MG: 10 TABLET ORAL at 06:18

## 2021-06-27 RX ADMIN — DULOXETINE HYDROCHLORIDE 60 MG: 30 CAPSULE, DELAYED RELEASE ORAL at 20:45

## 2021-06-27 RX ADMIN — GABAPENTIN 400 MG: 400 CAPSULE ORAL at 06:20

## 2021-06-27 RX ADMIN — AMITRIPTYLINE HYDROCHLORIDE 10 MG: 10 TABLET, FILM COATED ORAL at 06:20

## 2021-06-27 RX ADMIN — LIOTHYRONINE SODIUM 25 MCG: 5 TABLET ORAL at 20:43

## 2021-06-27 RX ADMIN — APIXABAN 5 MG: 5 TABLET, FILM COATED ORAL at 06:20

## 2021-06-27 RX ADMIN — COLESEVELAM HYDROCHLORIDE 625 MG: 625 TABLET, FILM COATED ORAL at 08:33

## 2021-06-27 RX ADMIN — NYSTATIN: 100000 POWDER TOPICAL at 06:21

## 2021-06-27 RX ADMIN — CALCITONIN SALMON 200 UNITS: 200 SPRAY, METERED NASAL at 20:47

## 2021-06-27 RX ADMIN — LOSARTAN POTASSIUM 25 MG: 25 TABLET, FILM COATED ORAL at 06:20

## 2021-06-27 RX ADMIN — COLESEVELAM HYDROCHLORIDE 625 MG: 625 TABLET, FILM COATED ORAL at 20:44

## 2021-06-27 RX ADMIN — MONTELUKAST 10 MG: 10 TABLET, FILM COATED ORAL at 17:32

## 2021-06-27 RX ADMIN — COLESEVELAM HYDROCHLORIDE 625 MG: 625 TABLET, FILM COATED ORAL at 15:32

## 2021-06-27 RX ADMIN — NYSTATIN: 100000 POWDER TOPICAL at 17:36

## 2021-06-27 RX ADMIN — SUMATRIPTAN SUCCINATE 50 MG: 25 TABLET ORAL at 20:42

## 2021-06-27 RX ADMIN — FLUDROCORTISONE ACETATE 0.1 MG: 0.1 TABLET ORAL at 06:18

## 2021-06-27 RX ADMIN — APIXABAN 5 MG: 5 TABLET, FILM COATED ORAL at 17:32

## 2021-06-27 RX ADMIN — GABAPENTIN 400 MG: 400 CAPSULE ORAL at 17:32

## 2021-06-27 RX ADMIN — LEVOTHYROXINE SODIUM 75 MCG: 0.07 TABLET ORAL at 06:20

## 2021-06-27 ASSESSMENT — PAIN DESCRIPTION - PAIN TYPE: TYPE: ACUTE PAIN

## 2021-06-27 ASSESSMENT — ENCOUNTER SYMPTOMS
FOCAL WEAKNESS: 0
COUGH: 0
FEVER: 0
ABDOMINAL PAIN: 0
SHORTNESS OF BREATH: 0
CHILLS: 0
NAUSEA: 0
DIZZINESS: 0
VOMITING: 0

## 2021-06-27 ASSESSMENT — FIBROSIS 4 INDEX
FIB4 SCORE: 1.33
FIB4 SCORE: 1.33
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
## 2021-06-27 NOTE — PROGRESS NOTES
Telemetry Shift Summary    Rhythm SB-SR  HR Range 53-82  Ectopy rare PVCs  Measurements .20/.08/.42        Normal Values  Rhythm SR  HR Range    Measurements 0.12-0.20 / 0.06-0.10  / 0.30-0.52

## 2021-06-27 NOTE — PROGRESS NOTES
Hospital Medicine Daily Progress Note    Date of Service  6/27/2021    Chief Complaint  55 y.o. female admitted 6/22/2021 with syncope    Hospital Course  55 y.o. female with a past medical history of chronic systolic heart failure, hypertension, depression, hypothyroidism, adrenal insufficiency and history of pulmonary embolism on anticoagulation with apixaban who presented 6/22/2021 with multiple episodes of transient loss of consciousness.  Patient reports that she had multiple episodes of syncope on the day of admission episodes lasting between 2-3 minutes.  All of them were preceded with a change in posture from lying in bed and standing or from sitting in the chair to start walking.  Patient denies having palpitations shortness of breath or chest pain.  There were no fecal or urinary incontinence.  There are no abnormal jerking movements or tongue biting.  Patient reports that her heart failure medications were recently increased and since then she has been having more frequent syncope attacks.    Interval Problem Update  6/24 - Pt significantly orthostatic this morning, dropped from 144 -->71 systolic with standing with significant rise in HR.  Discussed with patient - she has a medically complex situation, likely complicated by her adrenal insufficiency, need for cardiac meds given heart failure, and likely autonomic dysfunction. Having mild intermittent bradycardia that may be contributing as well. She is feeling better today but is not currently at her baseline.    6/25 - Bps didn't drop with orthostatics today, but HR shot up.  Still, some improvement as she is no longer having syncopal episodes. Will start trial off of IVFs.  Did not have SILVERIO hose placed, asked nursing to place.  Abdominal binder to remain in place while awake.    6/27 - Did not go to rehab yesterday, mix up on rehab's end with patients. They are evaluating her for discharge today. VSS, Bps did not overtly drop with PT yesterday but did  not ambulate due to her history of syncope with ambulation.    Consultants/Specialty  None    Code Status  Full Code    Disposition  Rehab if accepted     Review of Systems  Review of Systems   Constitutional: Negative for chills and fever.   Respiratory: Negative for cough and shortness of breath.    Cardiovascular: Negative for chest pain and leg swelling.   Gastrointestinal: Negative for abdominal pain, nausea and vomiting.   Skin: Rash: Skin breakage.   Neurological: Negative for dizziness and focal weakness.   All other systems reviewed and are negative.       Physical Exam  Temp:  [36.4 °C (97.6 °F)-36.8 °C (98.3 °F)] 36.4 °C (97.6 °F)  Pulse:  [] 88  Resp:  [16-17] 16  BP: (113-146)/(75-97) 131/97  SpO2:  [95 %-100 %] 97 %    Physical Exam  Vitals and nursing note reviewed.   Constitutional:       Appearance: Normal appearance. She is not ill-appearing.   HENT:      Head: Normocephalic and atraumatic.      Mouth/Throat:      Mouth: Mucous membranes are dry.   Cardiovascular:      Rate and Rhythm: Normal rate and regular rhythm.   Pulmonary:      Effort: Pulmonary effort is normal. No respiratory distress.      Breath sounds: Normal breath sounds. No wheezing or rales.   Abdominal:      General: Abdomen is flat. Bowel sounds are normal.      Palpations: Abdomen is soft.   Musculoskeletal:         General: No swelling.   Skin:     General: Skin is warm and dry.      Comments: Multiple skin tears     Neurological:      General: No focal deficit present.      Mental Status: She is alert and oriented to person, place, and time.      Cranial Nerves: No cranial nerve deficit.   Psychiatric:         Mood and Affect: Mood normal.         Behavior: Behavior normal.         Fluids    Intake/Output Summary (Last 24 hours) at 6/27/2021 0854  Last data filed at 6/27/2021 0600  Gross per 24 hour   Intake --   Output 1225 ml   Net -1225 ml       Laboratory  Recent Labs     06/25/21  0516   WBC 7.4   RBC 4.66    HEMOGLOBIN 13.7   HEMATOCRIT 45.5   MCV 97.6   MCH 29.4   MCHC 30.1*   RDW 58.4*   PLATELETCT 305   MPV 11.0     Recent Labs     06/25/21  0516   SODIUM 136   POTASSIUM 4.3   CHLORIDE 107   CO2 18*   GLUCOSE 104*   BUN 18   CREATININE 0.61   CALCIUM 8.1*                   Imaging  CT-HEAD W/O   Final Result      No acute intracranial abnormality.      DX-CHEST-PORTABLE (1 VIEW)   Final Result      No acute cardiopulmonary abnormality identified.           Assessment/Plan  * Syncope due to orthostatic hypotension- (present on admission)  Assessment & Plan  - Significantly multifactorial - patient has been admitted numerous times with orthostasis and syncope.  I think she most likely has an autonomic disorder contributing to her orthostasis, that is worsened by her known adrenal insufficiency and need for ARBs/BB due to a history of systolic heart failure  - Her goal will likely be to utilize the lowest dose of Coreg and Losartan that will maintain a good cardiac output for her while minimizing orthostasis  - Avoid volume depletion - she is on 40mg of Lasix daily but has a recovered EF - she likely does not need such a large dose. Will consider 20mg daily or PRN for peripheral edema at discharge   - Dr Pryor at [facility] specializes in autonomic disorders - pt already sees an NP there for migraines, we will try to get her an appt with him to evaluate for autonomic dysfunction at discharge. If not, there is an autonomic disorder specialist at [place] that we can try to get her in with  - In the meantime, added Florinef to her hydrocortisone to add some mineralocorticoid activity.   - Added an abdominal binder and SILVERIO hose with good effect   - Looking at getting pt to rehab    History of pulmonary embolism- (present on admission)  Assessment & Plan  Diagnosed last May  Resumed home anticoagulation with apixaban.    MRSA (methicillin resistant Staphylococcus aureus) sinus infection- (present on  admission)  Assessment & Plan  Resume home infusions of Eravacycline Dihydrochloride    Adrenal insufficiency (M[place]im[place]'s disease) ([facility])- (present on admission)  Assessment & Plan  Resumed home dose hydrocortisone, sees Endocrine as an outpatient. Added Florinef for mineralocorticoid activity.     MARY LOU (acute kidney injury) ([facility])  Assessment & Plan  Mostly due to dehydration secondary to polypharmacy/overdiuresis  Stop IVFs today  Avoid nephrotoxins, monitor inputs and outputs     Hypertension- (present on admission)  Assessment & Plan  Difficult to manage with her orthostasis  Reduced dose of carvedilol and losartan with hold parameters.  We will hold home furosemide, doxazosin and amiloride at this point    Hypothyroidism- (present on admission)  Assessment & Plan  Resumed home levothyroxine - a little overcorrected TSH on last labs, but free T4 was normal, will have pt recheck with her PCP in three weeks    CKD (chronic kidney disease) stage 3, GFR 30-59 ml/min ([facility])- (present on admission)  Assessment & Plan  Avoid nephrotoxins as much as possible, renally dose medications, monitor inputs and outputs     Depression- (present on admission)  Assessment & Plan  Resumed home amitriptyline and duloxetine  Antidepressants can be associated with orthostasis, will discuss with patient whether she is comfortable tapering those as an outpatient     Chronic systolic heart failure ([facility])- (present on admission)  Assessment & Plan  - Not currently in exacerbation.  - Recent echo reviewed, shows improvement in ejection fraction up to 70% compared to 20% back in November 2019 (had Takotsubo's)  - Decreased home carvedilol  given her hypotension and syncope.  - Reduced home dose losartan given her hypotension and syncope.   - Hold home furosemide given her dehydration.  Daily strict input and output  Daily standing weights.    Gastroesophageal reflux disease without esophagitis- (present on admission)  Assessment & Plan  Continue  home omeprazole       VTE prophylaxis: Eliquis

## 2021-06-27 NOTE — CARE PLAN
The patient is Stable - Low risk of patient condition declining or worsening    Shift Goals  Clinical Goals: get acceptance to rehab and discharge  Patient Goals: discharge to rehab    Progress made toward(s) clinical / shift goals:  patient was accepted to rehab, waiting on bed availability    Patient is not progressing towards the following goals:    Problem: Knowledge Deficit - Standard  Goal: Patient and family/care givers will demonstrate understanding of plan of care, disease process/condition, diagnostic tests and medications  Outcome: Progressing  Discussed plan of care with patient including discharge planning and safety. Allowed time for questions, patient agreed and verbalized understanding.     Problem: Discharge Barriers/Planning  Goal: Patient's continuum of care needs are met  Outcome: Progressing  Patient has been accepted to Rehab pending bed availability and transport.

## 2021-06-27 NOTE — PROGRESS NOTES
0710 Report received from Mary ROBERTSON. Plan of care discussed. Patient resting comfortably in bed, denies any needs at this time. Safety precautions in place.     0830 Assessment completed, patient is alert and oriented x 4, patient denies any pain at this time. Discussed plan of care and waiting on rehab acceptance, patient agrees with plan of care. Safety precautions in place.     0930 Rounded with Dr. Andrews, patient is medically cleared awaiting on rehab acceptance.

## 2021-06-27 NOTE — DISCHARGE PLANNING
"Anticipated Discharge Disposition:   TBD, possibly Rehab     Action:   Chart review complete.     Per Rehab note from 6/27, \"The patient is a Good candidate for an acute inpatient rehabilitation program with a coordinated program of care at an intensity and frequency not available at a lower level of care.\"     1215: RN CM called [facility] and [facility].     1534: RN CM informed by DPA that admissions in not the office today at rehab. Bedside RN and MD notified.     Barriers to Discharge:   IRF bed availability     Plan:   Hospital care management will continue to assist with discharge planning needs.     Care Transition Team Assessment    Information Source  Orientation Level: Oriented X4  Information Given By: Other (Comments)  Who is responsible for making decisions for patient? : Patient    Readmission Evaluation  Is this a readmission?: Yes - unplanned readmission    Elopement Risk  Legal Hold: No  Ambulatory or Self Mobile in Wheelchair: No-Not an Elopement Risk  Disoriented: No  Psychiatric Symptoms: None  History of Wandering: No  Elopement this Admit: No  Vocalizing Wanting to Leave: No  Displays Behaviors, Body Language Wanting to Leave: No-Not at Risk for Elopement  Elopement Risk: Not at Risk for Elopement    Interdisciplinary Discharge Planning  Does Admitting Nurse Feel This Could be a Complex Discharge?: No  Primary Care Physician: TOÑO MORATAYA M.D.  Lives with - Patient's Self Care Capacity: Spouse  Patient or legal guardian wants to designate a caregiver: No  Support Systems: Spouse / Significant Other  Housing / Facility: 2 Story House  Do You Take your Prescribed Medications Regularly: Yes  Able to Return to Previous ADL's: Future Time w/Therapy  Mobility Issues: Yes  Prior Services: Intermittent Physical Support for ADL Per Service  Patient Prefers to be Discharged to:: Rehab   Assistance Needed: Yes    Discharge " Preparedness  What is your plan after discharge?: Skilled nursing facility (rehab )  What are your discharge supports?: Spouse    Finances  Financial Barriers to Discharge: No  Prescription Coverage: Yes    Vision / Hearing Impairment  Vision Impairment : Yes  Right Eye Vision:  (Reading glasses only)  Left Eye Vision:  (Reading glasses only)  Hearing Impairment : Yes  Hearing Impairment: Both Ears, Hearing Device Not Available  Does Pt Need Special Equipment for the Hearing Impaired?: No    Advance Directive  Advance Directive?: None    Domestic Abuse  Have you ever been the victim of abuse or violence?: No  Physical Abuse or Sexual Abuse: No  Verbal Abuse or Emotional Abuse: No  Possible Abuse/Neglect Reported to:: Not Applicable    Discharge Risks or Barriers  Discharge risks or barriers?: Complex medical needs  Patient risk factors: Complex medical needs    Anticipated Discharge Information  Discharge Disposition: Still a Patient (30)

## 2021-06-27 NOTE — DISCHARGE PLANNING
Late Entry    Received call from Sophia with [facility]. Sophia states they have concerns over pt discharging home. She states pt has been re-admitted twice already and they believe pt would benefit from placement.     Sophia states that if pt returns home they will need updated order for [facility] or resumption orders if they remain the same.

## 2021-06-27 NOTE — DISCHARGE PLANNING
Agency/Facility Name: [facility]  Outcome: Left a vmail for Sal at [facility]    Agency/Facility Name: [facility]  Spoke to: Kishore  Outcome: Kishore stated that admissions is not in today for review. Transferred this DPA to Sharifa Stafford's vmail and left a message regarding referral

## 2021-06-27 NOTE — DISCHARGE PLANNING
Received Choice form at [phone]  Agency/Facility Name: [facility]  Referral sent per Choice form @ [phone]

## 2021-06-27 NOTE — PROGRESS NOTES
Telemetry Shift Summary    Rhythm: SR, 1AVB  HR: 60-90  Ectopy: R-PVC3    Measurements for strip printed 6/26/2021 at 1407  HR 65  0.22 / 0.08 / 0.42    Normal Values  Rhythm: SR  HR:   Measurements: 0.12-0.20 / 0.06-0.10 / 0.30-0.52

## 2021-06-27 NOTE — PROGRESS NOTES
Tele strip at 0208 shows SR-ST.      Measurements from am strip were as follows:  SC=0.22  QRS=0.10  QT=0.40    Tele Shift Summary:    Rhythm : SR-ST  Rate :   Ectopy : Per CCT Trini, pt had R PVCs.     Telemetry monitoring strips placed in pt's chart.

## 2021-06-27 NOTE — CARE PLAN
Problem: Pain - Standard  Goal: Alleviation of pain or a reduction in pain to the patient’s comfort goal  Outcome: Progressing     Problem: Fall Risk  Goal: Patient will remain free from falls  Outcome: Progressing   The patient is Stable - Low risk of patient condition declining or worsening    Shift Goals  Clinical Goals: Prevent syncopal episodes  Patient Goals: discharge tomorrow     Progress made toward(s) clinical / shift goals:  pt using tylenol for headache.     Patient is not progressing towards the following goals:

## 2021-06-28 ENCOUNTER — PATIENT OUTREACH (OUTPATIENT)
Dept: HEALTH INFORMATION MANAGEMENT | Facility: OTHER | Age: 56
End: 2021-06-28

## 2021-06-28 ENCOUNTER — HOSPITAL ENCOUNTER (INPATIENT)
Facility: REHABILITATION | Age: 56
LOS: 11 days | DRG: 644 | End: 2021-07-09
Attending: PHYSICAL MEDICINE & REHABILITATION | Admitting: PHYSICAL MEDICINE & REHABILITATION
Payer: MEDICARE

## 2021-06-28 VITALS
OXYGEN SATURATION: 98 % | DIASTOLIC BLOOD PRESSURE: 74 MMHG | RESPIRATION RATE: 18 BRPM | TEMPERATURE: 97.7 F | SYSTOLIC BLOOD PRESSURE: 106 MMHG | BODY MASS INDEX: 38.77 KG/M2 | HEART RATE: 95 BPM | WEIGHT: 227.07 LBS | HEIGHT: 64 IN

## 2021-06-28 PROCEDURE — U0003 INFECTIOUS AGENT DETECTION BY NUCLEIC ACID (DNA OR RNA); SEVERE ACUTE RESPIRATORY SYNDROME CORONAVIRUS 2 (SARS-COV-2) (CORONAVIRUS DISEASE [COVID-19]), AMPLIFIED PROBE TECHNIQUE, MAKING USE OF HIGH THROUGHPUT TECHNOLOGIES AS DESCRIBED BY CMS-2020-01-R: HCPCS

## 2021-06-28 PROCEDURE — A9270 NON-COVERED ITEM OR SERVICE: HCPCS | Performed by: PHYSICAL MEDICINE & REHABILITATION

## 2021-06-28 PROCEDURE — 99223 1ST HOSP IP/OBS HIGH 75: CPT | Mod: AI | Performed by: PHYSICAL MEDICINE & REHABILITATION

## 2021-06-28 PROCEDURE — 700102 HCHG RX REV CODE 250 W/ 637 OVERRIDE(OP): Performed by: FAMILY MEDICINE

## 2021-06-28 PROCEDURE — A9270 NON-COVERED ITEM OR SERVICE: HCPCS | Performed by: FAMILY MEDICINE

## 2021-06-28 PROCEDURE — A9270 NON-COVERED ITEM OR SERVICE: HCPCS | Performed by: HOSPITALIST

## 2021-06-28 PROCEDURE — 700102 HCHG RX REV CODE 250 W/ 637 OVERRIDE(OP): Performed by: HOSPITALIST

## 2021-06-28 PROCEDURE — 94760 N-INVAS EAR/PLS OXIMETRY 1: CPT

## 2021-06-28 PROCEDURE — 770010 HCHG ROOM/CARE - REHAB SEMI PRIVAT*

## 2021-06-28 PROCEDURE — U0005 INFEC AGEN DETEC AMPLI PROBE: HCPCS

## 2021-06-28 PROCEDURE — 99239 HOSP IP/OBS DSCHRG MGMT >30: CPT | Performed by: FAMILY MEDICINE

## 2021-06-28 PROCEDURE — 700102 HCHG RX REV CODE 250 W/ 637 OVERRIDE(OP): Performed by: PHYSICAL MEDICINE & REHABILITATION

## 2021-06-28 RX ORDER — NYSTATIN 100000 [USP'U]/G
POWDER TOPICAL 2 TIMES DAILY
Status: COMPLETED | OUTPATIENT
Start: 2021-06-28 | End: 2021-06-29

## 2021-06-28 RX ORDER — LEVOTHYROXINE SODIUM 0.07 MG/1
75 TABLET ORAL
Status: DISCONTINUED | OUTPATIENT
Start: 2021-06-29 | End: 2021-06-30 | Stop reason: HOSPADM

## 2021-06-28 RX ORDER — FLUDROCORTISONE ACETATE 0.1 MG/1
0.1 TABLET ORAL EVERY MORNING
Status: CANCELLED | OUTPATIENT
Start: 2021-06-29

## 2021-06-28 RX ORDER — LEVOTHYROXINE SODIUM 0.07 MG/1
75 TABLET ORAL
Status: CANCELLED | OUTPATIENT
Start: 2021-06-29

## 2021-06-28 RX ORDER — OMEPRAZOLE 20 MG/1
20 CAPSULE, DELAYED RELEASE ORAL DAILY
Status: DISCONTINUED | OUTPATIENT
Start: 2021-06-29 | End: 2021-06-30 | Stop reason: HOSPADM

## 2021-06-28 RX ORDER — CARVEDILOL 3.12 MG/1
3.12 TABLET ORAL 2 TIMES DAILY WITH MEALS
Status: DISCONTINUED | OUTPATIENT
Start: 2021-06-28 | End: 2021-06-30 | Stop reason: HOSPADM

## 2021-06-28 RX ORDER — AMITRIPTYLINE HYDROCHLORIDE 10 MG/1
10 TABLET, FILM COATED ORAL DAILY
Status: CANCELLED | OUTPATIENT
Start: 2021-06-29

## 2021-06-28 RX ORDER — CARVEDILOL 3.12 MG/1
3.12 TABLET ORAL 2 TIMES DAILY WITH MEALS
Status: CANCELLED | OUTPATIENT
Start: 2021-06-28

## 2021-06-28 RX ORDER — TRAZODONE HYDROCHLORIDE 50 MG/1
50 TABLET ORAL
Status: DISCONTINUED | OUTPATIENT
Start: 2021-06-28 | End: 2021-06-30 | Stop reason: HOSPADM

## 2021-06-28 RX ORDER — LIOTHYRONINE SODIUM 25 UG/1
25 TABLET ORAL
Status: DISCONTINUED | OUTPATIENT
Start: 2021-06-28 | End: 2021-06-30 | Stop reason: HOSPADM

## 2021-06-28 RX ORDER — LOSARTAN POTASSIUM 25 MG/1
25 TABLET ORAL
Status: DISCONTINUED | OUTPATIENT
Start: 2021-06-29 | End: 2021-06-29

## 2021-06-28 RX ORDER — ACETAMINOPHEN 325 MG/1
650 TABLET ORAL EVERY 4 HOURS PRN
Status: DISCONTINUED | OUTPATIENT
Start: 2021-06-28 | End: 2021-06-30 | Stop reason: HOSPADM

## 2021-06-28 RX ORDER — AMOXICILLIN 250 MG
2 CAPSULE ORAL 2 TIMES DAILY
Status: DISCONTINUED | OUTPATIENT
Start: 2021-06-28 | End: 2021-06-30 | Stop reason: HOSPADM

## 2021-06-28 RX ORDER — MONTELUKAST SODIUM 10 MG/1
10 TABLET ORAL EVERY EVENING
Status: CANCELLED | OUTPATIENT
Start: 2021-06-28

## 2021-06-28 RX ORDER — MONTELUKAST SODIUM 10 MG/1
10 TABLET ORAL EVERY EVENING
Status: DISCONTINUED | OUTPATIENT
Start: 2021-06-28 | End: 2021-06-30 | Stop reason: HOSPADM

## 2021-06-28 RX ORDER — DULOXETIN HYDROCHLORIDE 30 MG/1
60 CAPSULE, DELAYED RELEASE ORAL
Status: CANCELLED | OUTPATIENT
Start: 2021-06-28

## 2021-06-28 RX ORDER — COLESEVELAM 180 1/1
625 TABLET ORAL 3 TIMES DAILY
Status: CANCELLED | OUTPATIENT
Start: 2021-06-28

## 2021-06-28 RX ORDER — AMITRIPTYLINE HYDROCHLORIDE 10 MG/1
10 TABLET, FILM COATED ORAL DAILY
Status: DISCONTINUED | OUTPATIENT
Start: 2021-06-29 | End: 2021-06-30 | Stop reason: HOSPADM

## 2021-06-28 RX ORDER — ALBUTEROL SULFATE 90 UG/1
2 AEROSOL, METERED RESPIRATORY (INHALATION) EVERY 4 HOURS PRN
Status: CANCELLED | OUTPATIENT
Start: 2021-06-28

## 2021-06-28 RX ORDER — CALCITONIN SALMON 200 [IU]/.09ML
1 SPRAY, METERED NASAL
Status: CANCELLED | OUTPATIENT
Start: 2021-06-28

## 2021-06-28 RX ORDER — TIZANIDINE 4 MG/1
4 TABLET ORAL
Status: DISCONTINUED | OUTPATIENT
Start: 2021-06-28 | End: 2021-06-30 | Stop reason: HOSPADM

## 2021-06-28 RX ORDER — LIOTHYRONINE SODIUM 5 UG/1
25 TABLET ORAL
Status: CANCELLED | OUTPATIENT
Start: 2021-06-28

## 2021-06-28 RX ORDER — HYDRALAZINE HYDROCHLORIDE 25 MG/1
25 TABLET, FILM COATED ORAL EVERY 8 HOURS PRN
Status: DISCONTINUED | OUTPATIENT
Start: 2021-06-28 | End: 2021-06-30 | Stop reason: HOSPADM

## 2021-06-28 RX ORDER — LOSARTAN POTASSIUM 25 MG/1
25 TABLET ORAL
Status: CANCELLED | OUTPATIENT
Start: 2021-06-29

## 2021-06-28 RX ORDER — TIZANIDINE 4 MG/1
4 TABLET ORAL
Status: CANCELLED | OUTPATIENT
Start: 2021-06-28

## 2021-06-28 RX ORDER — ONDANSETRON 4 MG/1
4 TABLET, ORALLY DISINTEGRATING ORAL 4 TIMES DAILY PRN
Status: DISCONTINUED | OUTPATIENT
Start: 2021-06-28 | End: 2021-06-30 | Stop reason: HOSPADM

## 2021-06-28 RX ORDER — POLYVINYL ALCOHOL 14 MG/ML
1 SOLUTION/ DROPS OPHTHALMIC PRN
Status: DISCONTINUED | OUTPATIENT
Start: 2021-06-28 | End: 2021-06-30 | Stop reason: HOSPADM

## 2021-06-28 RX ORDER — FLUDROCORTISONE ACETATE 0.1 MG/1
0.1 TABLET ORAL EVERY MORNING
Status: DISCONTINUED | OUTPATIENT
Start: 2021-06-29 | End: 2021-06-29

## 2021-06-28 RX ORDER — ONDANSETRON 2 MG/ML
4 INJECTION INTRAMUSCULAR; INTRAVENOUS 4 TIMES DAILY PRN
Status: DISCONTINUED | OUTPATIENT
Start: 2021-06-28 | End: 2021-06-30 | Stop reason: HOSPADM

## 2021-06-28 RX ORDER — OMEPRAZOLE 20 MG/1
20 CAPSULE, DELAYED RELEASE ORAL DAILY
Status: CANCELLED | OUTPATIENT
Start: 2021-06-29

## 2021-06-28 RX ORDER — ECHINACEA PURPUREA EXTRACT 125 MG
2 TABLET ORAL PRN
Status: DISCONTINUED | OUTPATIENT
Start: 2021-06-28 | End: 2021-06-30 | Stop reason: HOSPADM

## 2021-06-28 RX ORDER — COLESEVELAM 180 1/1
625 TABLET ORAL 3 TIMES DAILY
Status: DISCONTINUED | OUTPATIENT
Start: 2021-06-28 | End: 2021-06-30 | Stop reason: HOSPADM

## 2021-06-28 RX ORDER — VITAMIN B COMPLEX
2000 TABLET ORAL DAILY
Status: DISCONTINUED | OUTPATIENT
Start: 2021-06-29 | End: 2021-06-30 | Stop reason: HOSPADM

## 2021-06-28 RX ORDER — DULOXETIN HYDROCHLORIDE 30 MG/1
60 CAPSULE, DELAYED RELEASE ORAL
Status: DISCONTINUED | OUTPATIENT
Start: 2021-06-28 | End: 2021-06-30 | Stop reason: HOSPADM

## 2021-06-28 RX ORDER — SUMATRIPTAN 25 MG/1
50 TABLET, FILM COATED ORAL PRN
Status: CANCELLED | OUTPATIENT
Start: 2021-06-28

## 2021-06-28 RX ORDER — HYDROCORTISONE 10 MG/1
10 TABLET ORAL 2 TIMES DAILY
Status: CANCELLED | OUTPATIENT
Start: 2021-06-28

## 2021-06-28 RX ORDER — CALCITONIN SALMON 200 [IU]/.09ML
1 SPRAY, METERED NASAL
Status: DISCONTINUED | OUTPATIENT
Start: 2021-06-28 | End: 2021-06-30 | Stop reason: HOSPADM

## 2021-06-28 RX ORDER — ALUMINA, MAGNESIA, AND SIMETHICONE 2400; 2400; 240 MG/30ML; MG/30ML; MG/30ML
20 SUSPENSION ORAL
Status: DISCONTINUED | OUTPATIENT
Start: 2021-06-28 | End: 2021-06-30 | Stop reason: HOSPADM

## 2021-06-28 RX ORDER — SUMATRIPTAN 25 MG/1
50 TABLET, FILM COATED ORAL
Status: DISCONTINUED | OUTPATIENT
Start: 2021-06-28 | End: 2021-06-30 | Stop reason: HOSPADM

## 2021-06-28 RX ORDER — HYDROCORTISONE 10 MG/1
10 TABLET ORAL 2 TIMES DAILY
Status: DISCONTINUED | OUTPATIENT
Start: 2021-06-28 | End: 2021-06-29

## 2021-06-28 RX ORDER — GABAPENTIN 400 MG/1
400 CAPSULE ORAL 2 TIMES DAILY
Status: CANCELLED | OUTPATIENT
Start: 2021-06-28

## 2021-06-28 RX ORDER — BISACODYL 10 MG
10 SUPPOSITORY, RECTAL RECTAL
Status: DISCONTINUED | OUTPATIENT
Start: 2021-06-28 | End: 2021-06-30 | Stop reason: HOSPADM

## 2021-06-28 RX ORDER — GABAPENTIN 400 MG/1
400 CAPSULE ORAL 2 TIMES DAILY
Status: DISCONTINUED | OUTPATIENT
Start: 2021-06-28 | End: 2021-06-30 | Stop reason: HOSPADM

## 2021-06-28 RX ORDER — NYSTATIN 100000 [USP'U]/G
POWDER TOPICAL 2 TIMES DAILY
Status: CANCELLED | OUTPATIENT
Start: 2021-06-28 | End: 2021-06-29

## 2021-06-28 RX ORDER — POLYETHYLENE GLYCOL 3350 17 G/17G
1 POWDER, FOR SOLUTION ORAL
Status: DISCONTINUED | OUTPATIENT
Start: 2021-06-28 | End: 2021-06-30 | Stop reason: HOSPADM

## 2021-06-28 RX ORDER — VITAMIN B COMPLEX
2000 TABLET ORAL DAILY
Status: CANCELLED | OUTPATIENT
Start: 2021-06-29

## 2021-06-28 RX ADMIN — ACETAMINOPHEN 650 MG: 325 TABLET, FILM COATED ORAL at 15:26

## 2021-06-28 RX ADMIN — GABAPENTIN 400 MG: 400 CAPSULE ORAL at 05:12

## 2021-06-28 RX ADMIN — DULOXETINE HYDROCHLORIDE 60 MG: 30 CAPSULE, DELAYED RELEASE ORAL at 21:17

## 2021-06-28 RX ADMIN — NYSTATIN: 100000 POWDER TOPICAL at 05:15

## 2021-06-28 RX ADMIN — LIOTHYRONINE SODIUM 25 MCG: 25 TABLET ORAL at 21:16

## 2021-06-28 RX ADMIN — HYDROCORTISONE 10 MG: 10 TABLET ORAL at 05:13

## 2021-06-28 RX ADMIN — Medication 2000 UNITS: at 05:12

## 2021-06-28 RX ADMIN — HYDROCORTISONE 10 MG: 10 TABLET ORAL at 21:16

## 2021-06-28 RX ADMIN — MONTELUKAST 10 MG: 10 TABLET, FILM COATED ORAL at 21:16

## 2021-06-28 RX ADMIN — CARVEDILOL 3.12 MG: 3.12 TABLET, FILM COATED ORAL at 17:40

## 2021-06-28 RX ADMIN — NYSTATIN: 100000 POWDER TOPICAL at 21:17

## 2021-06-28 RX ADMIN — CALCITONIN SALMON 200 UNITS: 200 SPRAY, METERED NASAL at 21:17

## 2021-06-28 RX ADMIN — LOSARTAN POTASSIUM 25 MG: 25 TABLET, FILM COATED ORAL at 05:12

## 2021-06-28 RX ADMIN — FLUDROCORTISONE ACETATE 0.1 MG: 0.1 TABLET ORAL at 05:13

## 2021-06-28 RX ADMIN — APIXABAN 5 MG: 5 TABLET, FILM COATED ORAL at 21:16

## 2021-06-28 RX ADMIN — LEVOTHYROXINE SODIUM 75 MCG: 0.07 TABLET ORAL at 05:12

## 2021-06-28 RX ADMIN — AMITRIPTYLINE HYDROCHLORIDE 10 MG: 10 TABLET, FILM COATED ORAL at 05:12

## 2021-06-28 RX ADMIN — COLESEVELAM HYDROCHLORIDE 625 MG: 625 TABLET, FILM COATED ORAL at 21:16

## 2021-06-28 RX ADMIN — GABAPENTIN 400 MG: 400 CAPSULE ORAL at 21:17

## 2021-06-28 RX ADMIN — COLESEVELAM HYDROCHLORIDE 625 MG: 625 TABLET, FILM COATED ORAL at 15:26

## 2021-06-28 RX ADMIN — APIXABAN 5 MG: 5 TABLET, FILM COATED ORAL at 05:12

## 2021-06-28 RX ADMIN — OMEPRAZOLE 20 MG: 20 CAPSULE, DELAYED RELEASE ORAL at 05:13

## 2021-06-28 RX ADMIN — COLESEVELAM HYDROCHLORIDE 625 MG: 625 TABLET, FILM COATED ORAL at 08:30

## 2021-06-28 ASSESSMENT — LIFESTYLE VARIABLES
TOTAL SCORE: 0
ON A TYPICAL DAY WHEN YOU DRINK ALCOHOL HOW MANY DRINKS DO YOU HAVE: 0
ALCOHOL_USE: NO
HOW MANY TIMES IN THE PAST YEAR HAVE YOU HAD 5 OR MORE DRINKS IN A DAY: 0
TOTAL SCORE: 0
AVERAGE NUMBER OF DAYS PER WEEK YOU HAVE A DRINK CONTAINING ALCOHOL: 0
HAVE YOU EVER FELT YOU SHOULD CUT DOWN ON YOUR DRINKING: NO
TOTAL SCORE: 0
CONSUMPTION TOTAL: NEGATIVE
EVER FELT BAD OR GUILTY ABOUT YOUR DRINKING: NO
EVER HAD A DRINK FIRST THING IN THE MORNING TO STEADY YOUR NERVES TO GET RID OF A HANGOVER: NO
HAVE PEOPLE ANNOYED YOU BY CRITICIZING YOUR DRINKING: NO

## 2021-06-28 ASSESSMENT — PAIN DESCRIPTION - PAIN TYPE
TYPE: ACUTE PAIN

## 2021-06-28 ASSESSMENT — FIBROSIS 4 INDEX: FIB4 SCORE: 1.33

## 2021-06-28 NOTE — DISCHARGE PLANNING
Anticipated Discharge Disposition:   IRF, [facility]     Action:   Chart review complete.     Per MD, patient is medically cleared to discharge to Rehab today.     0933: Case under review at Rehab.     1017: RN CM sent a voalte message to Sal Sawyer inquiring about bed availability for patient today.     Barriers to Discharge:   Rehab bed availability and transport     Plan:   Hospital care management will continue to assist with discharge planning needs.

## 2021-06-28 NOTE — PROGRESS NOTES
Tele strip at 0212 shows SB-ST with 1st Block.      Measurements from am strip were as follows:  MI=0.24  QRS=0.08  QT=0.32    Tele Shift Summary:    Rhythm : SB-SR  Rate :   Ectopy : Per CCT Sharifa, pt had R PVCs.     Telemetry monitoring strips placed in pt's chart.

## 2021-06-28 NOTE — FLOWSHEET NOTE
06/28/21 1326   Patient History   Pulmonary Diagnosis asthma, s/p PE   Procedures Relevant to Respiratory Status none   Home O2   (PRN for PE)   Nocturnal CPAP No   Home Treatments/Frequency Yes   SVN 1/Frequency Albuterol PRN   MDI 1/Frequency Albuterol PRN   Sleep Apnea Screening   Have you had a sleep study? Yes   Have you been diagnosed with sleep apnea? No   Protocol Pathways   Protocol Pathways Bronchodilator Protocol   Bronchodilator Protocol   Med Order With RCP No   Is this an exacerbation of COPD/Asthma? Patient on Home Regimen;No   Bronchodilator Indications History / Diagnosis   Breath Sounds Criteria 1    Benefit Criteria 0    Pulse Criteria 0    Respiratory Rate Criteria 1    S.O.B. Criteria 1    Criteria Total 3   Point Values 0-3 - PRN   Bronchodilator Goals/Outcome Patient at Stable Baseline      Nursing Notes    Patient presents to the office today in follow-up. Patient rates her pain at 8/10 on the numerical pain scale. Reviewed medications with counts as follows:    Rx Date filled Qty Dispensed Pill Count Last Dose Short   Morphine 15 mg 10/12/19 60 23 This am  no        reviewed YES  Any aberrancies noted on  NO  Last opioid agreement 12/26/18  Last urine drug screen 08/07/19      Patient is here today for a follow up appt today for her chronic shoulder pain. She states her pain level today is an 8.  phq2 was done patient denies any depression. She states she has seen her pcm and other MDs in [facility]   Pt has increased bp today. She was recently added on a new bp med that was added on her chart   POC UDS was not performed in office today    Any new labs or imaging since last appointment? NO    Have you been to an emergency room (ER) or urgent care clinic since your last visit? NO            Have you been hospitalized since your last visit? NO     If yes, where, when, and reason for visit? Have you seen or consulted any other health care providers outside of the [address] [place] [address]  since your last visit? NO     If yes, where, when, and reason for visit? Ms. Jayne Tomas has a reminder for a \"due or due soon\" health maintenance. I have asked that she contact her primary care provider for follow-up on this health maintenance.

## 2021-06-28 NOTE — PROGRESS NOTES
2 RN skin check done with admitting RN Roberta and AILYN Clifford. Face photo and skin photos documented in media. Appropriate LDAs opened.   Pt with Matthew score of 16, RN wound protocol ordered on 6/28, orders current. Prevention measures in place including,mepilex to heels and barrier cream buttocks and to groin.

## 2021-06-28 NOTE — FLOWSHEET NOTE
06/28/21 1330   Events/Summary/Plan   Events/Summary/Plan RT assessment   Vital Signs   Pulse 65   Respiration 18   Pulse Oximetry 98 %   $ Pulse Oximetry (Spot Check) Yes   Respiratory Assessment   Level of Consciousness Alert   Chest Exam   Work Of Breathing / Effort Within Normal Limits   Breath Sounds   RLL Breath Sounds Diminished   LLL Breath Sounds Diminished   Oxygen   O2 Delivery Device None - Room Air

## 2021-06-28 NOTE — PROGRESS NOTES
Patient will be discharging to [facility]. Report called to AILYN Granados. Pt educated on importance of follow up appointments and taking medications as prescribed. Pt verbalized understanding of instructions with no further questions at this time. Patients home medications were given. Pt escorted off unit by transport via wheelchair with belongings in hand.

## 2021-06-28 NOTE — DISCHARGE PLANNING
Msg placed to Dr. Andrews-verifying medical clearance.  Will discuss this case further with the Admissions Team this morning for a possible admission.     0810-Donna remains medically cleared per Dr. Andrews.  I spoke with Donna regarding [facility] and she is agreeable with an admission.  I did inform her that the confusion this past weekend was indeed my fault and I do apologize.     0933-Case is under review by Dr. Metzger.    1020-Dr. Metzger has accepted.  Transport has been arranged for 1130 via [facility].  Dr. Andrews, Donna & Malolry SOTO are aware.

## 2021-06-28 NOTE — DISCHARGE SUMMARY
Discharge Summary    CHIEF COMPLAINT ON ADMISSION  Chief Complaint   Patient presents with   • Syncope     Pt has hx of addisons and hypotension. Pt had 6 assisted syncopal episodes by  today. No trauma, pt is on eliquis for previous dx of PE   • Hypotension       Reason for Admission  EMS     Admission Date  6/22/2021    CODE STATUS  Full Code    HPI & HOSPITAL COURSE  This is a 55 y.o. female here with orthostatic syncope.  She has an extensive medical history significant for recovered systolic CHF, HTN, depression, hypothyroidism, adrenal insufficiency, PTE and a chronic sinus infection for which she is on Xerava via a PICC through [facility]. She has had multiple hospitalizations for orthostatic syncope which is significantly multifactorial -  I think she most likely has an autonomic disorder contributing to her orthostasis, that is worsened by her known adrenal insufficiency and need for ARBs/BB due to a history of systolic heart failure. Her goal will likely be to utilize the lowest dose of Coreg and Losartan that will maintain a good cardiac output for her while minimizing orthostasis, and avoid volume depletion - as such, we have changed her Lasix to PRN as her EF has recovered.      We added Florinef to her hydrocortisone for her adrenal insufficiency, and added an abdominal binder and SILVERIO hose to help with the orthostasis, all of which seemed to make a difference.  She will be going to rehab from here for her orthostasis, and she and I discussed pursuing a referral to either Dr Pryor or Colt for further evaluation of her dysautonomia.        Therefore, she is discharged in fair and stable condition to an inpatient rehabilitation hospital.    The patient met 2-midnight criteria for an inpatient stay at the time of discharge.    Discharge Date  6/28/21    FOLLOW UP ITEMS POST DISCHARGE  None    DISCHARGE DIAGNOSES  Principal Problem:    Syncope due to orthostatic hypotension POA: Yes       Overview: Overview:       Likely due to hypotension  Active Problems:    Gastroesophageal reflux disease without esophagitis POA: Yes    Chronic systolic heart failure (HCC) POA: Yes    Depression POA: Yes    CKD (chronic kidney disease) stage 3, GFR 30-59 ml/min ([facility]) POA: Yes    Hypothyroidism POA: Yes    Hypertension POA: Yes    MARY LOU (acute kidney injury) ([facility]) POA: Unknown    Adrenal insufficiency (Ashton's disease) ([facility]) POA: Yes    MRSA (methicillin resistant Staphylococcus aureus) sinus infection POA: Yes    History of pulmonary embolism POA: Yes  Resolved Problems:    * No resolved hospital problems. *      FOLLOW UP  Future Appointments   Date Time Provider Department Center   6/29/2021  3:00 PM [facility]   6/29/2021  3:30 PM William Srinivasan M.D. [facility]   7/6/2021  2[facility]   7/15/2021  9:40 AM KONSTANTIN FraserP.RCHIKI [facility] None   9/13/2021  8:15 AM [facility] EXAM 12 ECHO [facility]   9/20/2021  8:00 AM Eligio Torres M.D. [facility] None   9/21/2021 11:20 AM Michael J Bloch, M.D. [facility] None   10/12/2021  9:00 AM KONSTANTIN FraserP.R.JEISON [facility] None     Madisyn Mccullough M.D.  [address] [address]  [phone]    Schedule an appointment as soon as possible for a visit  After dc from rehab       MEDICATIONS ON DISCHARGE     Medication List      START taking these medications      Instructions   fludrocortisone 0.1 MG Tabs  Commonly known as: FLORINEF   Take 1 tablet by mouth every morning.  Dose: 0.1 mg     nystatin powder  Commonly known as: MYCOSTATIN   Apply to affected area        CHANGE how you take these medications      Instructions   amitriptyline 10 MG Tabs  What changed: See the new instructions.  Commonly known as: ELAVIL   TAKE 2 TABLETS BY MOUTH EVERY NIGHT AT BEDTIME, AND 1 TABLET BY MOUTH EVERY MORNING     carvedilol 3.125 MG Tabs  What changed:   · medication strength  · how much to take  Commonly known  as: COREG  Notes to patient: DO NOT take if HR is less than 65 or if top number of BP is less than 125   Take 1 tablet by mouth 2 times a day with meals.  Dose: 3.125 mg     furosemide 20 MG Tabs  What changed:   · when to take this  · reasons to take this  Commonly known as: LASIX   Take 1 tablet by mouth as needed (Edema).  Dose: 20 mg     losartan 25 MG Tabs  What changed:   · medication strength  · how much to take  Commonly known as: COZAAR   Take 1 tablet by mouth every day.  Dose: 25 mg        CONTINUE taking these medications      Instructions   BIOTIN PO   Take 500 mcg by mouth every day.  Dose: 500 mcg     calcitonin (salmon) 200 UNIT/ACT Soln  Commonly known as: MIACALCIN   Spray 1 Spray in nose every bedtime.  Dose: 1 Spray     CALCIUM-MAGNESIUM-ZINC PO   Take 1 tablet by mouth every day.  Dose: 1 tablet     colesevelam 625 MG Tabs  Commonly known as: WELCHOL   Take 625 mg by mouth in the morning, at noon, and at bedtime.  Dose: 625 mg     cyanocobalamin 1000 MCG/ML Soln  Commonly known as: VITAMIN B-12   Inject 1,000 mcg into the shoulder, thigh, or buttocks every 7 days. On Tue  Dose: 1,000 mcg     D3 2000 UNIT Tabs  Generic drug: Cholecalciferol   Take 2,000 Units by mouth every day.  Dose: 2,000 Units     Dexilant 60 MG Cpdr delayed-release capsule  Generic drug: Dexlansoprazole   Take 60 mg by mouth every evening.  Dose: 60 mg     DULoxetine 60 MG Cpep delayed-release capsule  Commonly known as: CYMBALTA   Take 60 mg by mouth every bedtime.  Dose: 60 mg     Eliquis 5mg Tabs  Generic drug: apixaban   Take 5 mg by mouth 2 times a day.  Dose: 5 mg     Erenumab 70 MG/ML Soaj  Commonly known as: AIMOVIG   Inject 140 mg under the skin Q30 DAYS.  Dose: 140 mg     estradiol 0.5 MG tablet  Commonly known as: ESTRACE   Take 0.5 mg by mouth every morning.  Dose: 0.5 mg     famotidine 40 MG Tabs  Commonly known as: PEPCID   Take 40 mg by mouth at bedtime.  Dose: 40 mg     Frova 2.5 MG Tabs  Generic drug:  Frovatriptan Succinate   Take 2.5 mg by mouth as needed (For migraines). MR x 1 in 1 hour if no relief  then must wait 8 hours for another dose  Dose: 2.5 mg     gabapentin 400 MG Caps  Commonly known as: NEURONTIN   Take 1 Cap by mouth 3 times a day.  Dose: 400 mg     hydrocortisone 10 MG Tabs  Commonly known as: CORTEF   Take 1 tablet by mouth 2 times a day.  Dose: 10 mg     KLS Aller-Carmelita 10 MG Tabs  Generic drug: cetirizine   Take 20 mg by mouth 2 times a day.  Dose: 20 mg     levalbuterol 45 MCG/ACT inhaler  Commonly known as: XOPENEX HFA   Inhale 1-2 Puffs every four hours as needed for Shortness of Breath.  Dose: 1-2 Puff     levothyroxine 75 MCG Tabs  Commonly known as: SYNTHROID   Take 75 mcg by mouth every morning on an empty stomach.  Dose: 75 mcg     liothyronine 25 MCG Tabs  Commonly known as: CYTOMEL   Take 25 mcg by mouth every bedtime.  Dose: 25 mcg     Magnesium 400 MG Tabs   Take 400 mg by mouth every day.  Dose: 400 mg     montelukast 10 MG Tabs  Commonly known as: SINGULAIR   Take 10 mg by mouth every evening.  Dose: 10 mg     multivitamin Tabs   Take 1 tablet by mouth every day.  Dose: 1 tablet     PROBIOTIC DAILY PO   Take 1 capsule by mouth every day.  Dose: 1 capsule     VITAMIN K PO   Take 1 tablet by mouth every day.  Dose: 1 tablet     Xerava 100 MG Solr  Generic drug: Eravacycline Dihydrochloride   Infuse 100 mg into a venous catheter 2 times a day. Pt started 6/10/2021 for 6 weeks  Dose: 100 mg     Zomacton 10 MG Solr  Generic drug: Somatropin   Inject 0.3 mg under the skin every evening.  Dose: 0.3 mg        STOP taking these medications    AMILoride 5 MG Tabs  Commonly known as: MIDAMOR     aspirin 81 MG Chew chewable tablet  Commonly known as: ASA     doxazosin 2 MG Tabs  Commonly known as: CARDURA     meloxicam 15 MG tablet  Commonly known as: MOBIC     pantoprazole 40 MG Tbec  Commonly known as: PROTONIX     potassium chloride SA 20 MEQ Tbcr  Commonly known as: Kdur     tizanidine 4  "MG Tabs  Commonly known as: ZANAFLEX            Allergies  Allergies   Allergen Reactions   • Ancef [Cefazolin] Hives and Shortness of Breath   • Bactrim [Sulfamethoxazole W-Trimethoprim] Shortness of Breath   • Bee Venom Anaphylaxis   • Buprenorphine Anaphylaxis     Plus hives and shortness of breath   • Clindamycin Hives and Shortness of Breath   • Contrast Media With Iodine [Iodine] Hives and Swelling   • Doxycycline Hives and Shortness of Breath   • Econazole Anaphylaxis   • Flagyl  [Metronidazole] Hives and Unspecified   • Floxin [Ofloxacin] Anaphylaxis, Shortness of Breath and Swelling     Cause throat swelling and difficulty breathing   • Gadolinium Derivatives Hives and Swelling     Throat swelling   • Hydrocodone-Acetaminophen Hives and Shortness of Breath   • Iodine Shortness of Breath and Anaphylaxis     Throat and tongue swelling with IV contrast   • Keflex Shortness of Breath     And hives   • Levofloxacin Shortness of Breath     And anaphylaxis reported   • Morphine Anaphylaxis   • Naloxone Hives     \"hives, SOB\"   • Nitrofurantoin Shortness of Breath     ...and hives     • Norco [Apap-Fd&C Yellow #10 Al Tai-Hydrocodone] Shortness of Breath     ...and hives     • Nyquil Hives and Shortness of Breath   • Oxycodone Shortness of Breath     ...and hives     • Paricalcitol Hives, Shortness of Breath and Unspecified     CHEST PAIN   • Penicillins Shortness of Breath     ...and hives     • Tape Contact Dermatitis and Swelling     Blisters, clear tegaderm ok.. No steristrips.  Other reaction(s): Other, Unknown   • Tramadol Hives   • Vicks Dayquil Cold Hives and Shortness of Breath   • Azithromycin Hives and Shortness of Breath     Pt had Hives also   • Bextra [Valdecoxib] Rash     \"Skin burn and peeling.\"   • Linezolid Rash     Rash all over body   • Codeine Shortness of Breath and Rash     Rash & SOB   • Sulfa Drugs      Other reaction(s): Hives   • Tygacil [Tigecycline]      itching       DIET  Orders " Placed This Encounter   Procedures   • Diet Order Diet: Cardiac     Standing Status:   Standing     Number of Occurrences:   1     Order Specific Question:   Diet:     Answer:   Cardiac [6]       ACTIVITY  As tolerated and directed by rehab.  Weight bearing as tolerated    CONSULTATIONS  None    PROCEDURES  None    LABORATORY  Lab Results   Component Value Date    SODIUM 136 06/25/2021    POTASSIUM 4.3 06/25/2021    CHLORIDE 107 06/25/2021    CO2 18 (L) 06/25/2021    GLUCOSE 104 (H) 06/25/2021    BUN 18 06/25/2021    CREATININE 0.61 06/25/2021        Lab Results   Component Value Date    WBC 7.4 06/25/2021    HEMOGLOBIN 13.7 06/25/2021    HEMATOCRIT 45.5 06/25/2021    PLATELETCT 305 06/25/2021        Total time of the discharge process exceeds 34 minutes.

## 2021-06-28 NOTE — PROGRESS NOTES
Assumed care from [place]. Patient resting. Bed in low locked no slip socks on. All safety precautions in place.

## 2021-06-28 NOTE — PROGRESS NOTES
Report given to Mary ROBERTSON. Plan of care discussed. Patient resting comfortably in bed. Safety precautions in place.

## 2021-06-28 NOTE — PROGRESS NOTES
Telemetry Shift Summary    Rhythm SR-ST with first degree  HR Range   Ectopy rare PVCs and PACs  Measurements .24/.08/.28        Normal Values  Rhythm SR  HR Range    Measurements 0.12-0.20 / 0.06-0.10  / 0.30-0.52

## 2021-06-28 NOTE — CARE PLAN
Problem: Pain - Standard  Goal: Alleviation of pain or a reduction in pain to the patient’s comfort goal  Outcome: Progressing   Headache control with MAR and heat packs.   Problem: Fall Risk  Goal: Patient will remain free from falls  Outcome: Progressing   The patient is Watcher - Medium risk of patient condition declining or worsening    Shift Goals  Clinical Goals: Control headache  Patient Goals: Discharge to rehab    Progress made toward(s) clinical / shift goals:  Patient comdortable enough to sleep.     Patient is not progressing towards the following goals:

## 2021-06-28 NOTE — PREADMISSION SCREENING NOTE
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 Front-Wheel Walker, Tub / Shower Seat  Lives with - Patient's Self Care Capacity: Spouse  Bed Mobility: Independent  Transfer Status: Independent  Ambulation: Independent  Distance Ambulation (Feet):  (limited community )  Assistive Devices Used: None  Stairs: Required Assist  Current Level of Function:   Gait Level Of Assist: Unable to Participate  Assistive Device: Front Wheel Walker  Supine to Sit: Modified Independent  Sit to Supine: Modified Independent  Scooting: Modified Independent  Comments: HOB elevated and rails up   Sit to Stand: Supervised  Bed, Chair, Wheelchair Transfer: Supervised  Transfer Method: Stand Step  Sitting in Chair: NT  Sitting Edge of Bed: 10  Standin  Occupational Therapy:   Self Feeding: Independent  Grooming / Hygiene: Independent  Bathing: Requires Assist  Dressing: Requires Assist  Toileting: Independent  Medication Management: Unable To Determine At This Time  Laundry: Requires Assist  Kitchen Mobility: Requires Assist  Finances: Requires Assist  Home Management: Requires Assist  Shopping: Requires Assist  Prior Level Of Mobility: Independent With Device in Home  Driving / Transportation: Relatives / Others Provide Transportation  Prior Services: Intermittent Physical Support for ADL Per Service  Housing / Facility: 2 [place]  House  Occupation (Pre-Hospital Vocational): Retired Due To Age  Leisure Interests: Family, Hobbies  Current Level of Function:   Eating: Independent  Upper Body Dressing: Supervision  Lower Body Dressing: Minimal Assist  Toileting: Moderate Assist (bed pan for bm)  Speech Language Pathology:      Rehabilitation Prognosis/Potential: Good  Estimated Length of Stay: 7-10 days    Nursing:      Continent    Scope/Intensity of Services Recommended:  Physical Therapy: 1.5 hr / day  5 days / week. Therapeutic Interventions Required: Maximize Endurance, Mobility, Strength and Safety  Occupational Therapy: 1.5 hr / day 5 days / week. Therapeutic Interventions Required: Maximize Self Care, ADLs, IADLs and Energy Conservation  Rehabilitation Nursin/7. Therapeutic Interventions Required: Monitor Pain, Skin, Vital Signs, Intake and Output, Labs, Safety and Family Training  Rehabilitation Physician: 3 - 5 days / week. Therapeutic Interventions Required: Medical Management    She requires 24-hour rehabilitation nursing to manage skin care, nutrition and fluid intake, pain control, safety, medication management and patient/family goals. In addition, rehabilitation nursing will reiterate and reinforce therapy skills and equipment use, including ADLs, as well as provide education to the patient and family. Donna Isaac is willing to participate in and is able to tolerate the proposed plan of care.    Rehabilitation Goals and Plan (Expected frequency & duration of treatment in the IRF):   Return to the Community, Minimal Assist Level of Care and Outpatient Support  Anticipated Date of Rehabilitation Admission: 21  Patient/Family oriented IRF level of care/facility/plan: Yes  Patient/Family willing to participate in IRF care/facility/plan: Yes  Patient able to tolerate IRF level of care proposed: Yes  Patient has potential to benefit IRF level of care proposed: Yes  Comments: Not Applicable    Special Needs or  Precautions - Medical Necessity:  Safety Concerns/Precautions:  Fall Risk / High Risk for Falls, Balance and Bed / Chair Alarm  Pain Management  Current Medications:    Current Facility-Administered Medications Ordered in Epic   Medication Dose Route Frequency Provider Last Rate Last Admin   • acetaminophen (Tylenol) tablet 650 mg  650 mg Oral Q4HRS PRN Alber Chin D.O.   650 mg at 06/26/21 2021   • nystatin (MYCOSTATIN) powder   Topical BID Alber Chin D.O.   Given at 06/28/21 0515   • fludrocortisone (FLORINEF) tablet 0.1 mg  0.1 mg Oral QAM Corazon Andrews M.D.   0.1 mg at 06/28/21 0513   • amitriptyline (ELAVIL) tablet 10 mg  10 mg Oral DAILY Asem MARTHA Courtney M.D.   10 mg at 06/28/21 0512   • calcitonin (salmon) (MIACALCIN) nasal spray 200 Units  1 [place] Nasal QHS Asem MARTHA Courtney M.D.   200 Units at 06/27/21 2047   • carvedilol (COREG) tablet 3.125 mg  3.125 mg Oral BID WITH MEALS Asem MARTHA Courtney M.D.   3.125 mg at 06/27/21 0833   • vitamin D (cholecalciferol) tablet 2,000 Units  2,000 Units Oral DAILY Asem MARTHA Courtney M.D.   2,000 Units at 06/28/21 0512   • colesevelam (WELCHOL) tablet 625 mg  625 mg Oral TID Asem MARTHA Courtney M.D.   625 mg at 06/28/21 0830   • DULoxetine (CYMBALTA) capsule 60 mg  60 mg Oral QHS Asem MARTHA Courtney M.D.   60 mg at 06/27/21 2045   • apixaban (ELIQUIS) tablet 5 mg  5 mg Oral BID Asekia Courtney M.D.   5 mg at 06/28/21 0512   • SUMAtriptan (IMITREX) tablet 50 mg  50 mg Oral PRN Asekia Courtney M.D.   50 mg at 06/27/21 2042   • gabapentin (NEURONTIN) capsule 400 mg  400 mg Oral BID Asem MARTHA Courtney M.D.   400 mg at 06/28/21 0512   • levothyroxine (SYNTHROID) tablet 75 mcg  75 mcg Oral AM ES Atilio Courtney M.D.   75 mcg at 06/28/21 0512   • liothyronine (CYTOMEL) tablet 25 mcg  25 mcg Oral QHS Atilio Courtney M.D.   25 mcg at 06/27/21 2043   • montelukast (SINGULAIR) tablet 10 mg  10 mg Oral Q EVENING Atilio Courtney M.D.   10 mg at 06/27/21 1732   • omeprazole  (PRILOSEC) capsule 20 mg  20 mg Oral DAILY Asem A ERNESTO Courtney   20 mg at 06/28/21 0513   • tizanidine (ZANAFLEX) tablet 4 mg  4 mg Oral QDAY PRN Asem A ERNESTO Courtney       • Eravacycline Dihydrochloride 100 mg,  mL   Intravenous BID Asem A ERNESTO Courtney   Given at 06/28/21 0600   • hydrocortisone (CORTEF) tablet 10 mg  10 mg Oral BID Asem A ERNESTO Courtney   10 mg at 06/28/21 0513   • polyethylene glycol/lytes (MIRALAX) PACKET 1 Packet  1 Packet Oral QDAY PRN Asem MARTHA Courtney M.D.        And   • bisacodyl (DULCOLAX) suppository 10 mg  10 mg Rectal QDAY PRN Asem MARTHA Courtney M.D.       • ondansetron (ZOFRAN) syringe/vial injection 4 mg  4 mg Intravenous Q4HRS PRN Asem MARTHA Courtney M.D.       • ondansetron (ZOFRAN ODT) dispertab 4 mg  4 mg Oral Q4HRS PRN Asem MARTHA Courtney M.D.       • promethazine (PHENERGAN) suppository 12.5-25 mg  12.5-25 mg Rectal Q4HRS PRN Asem MARTHA Courtney M.D.       • losartan (COZAAR) tablet 25 mg  25 mg Oral Q DAY Asem A ERNESTO Courtney   25 mg at 06/28/21 0512     Current Outpatient Medications Ordered in Epic   Medication Sig Dispense Refill   • furosemide (LASIX) 20 MG Tab Take 1 tablet by mouth as needed (Edema). 30 tablet 0   • carvedilol (COREG) 3.125 MG Tab Take 1 tablet by mouth 2 times a day with meals. 60 tablet 0   • losartan (COZAAR) 25 MG Tab Take 1 tablet by mouth every day. 30 tablet 0   • fludrocortisone (FLORINEF) 0.1 MG Tab Take 1 tablet by mouth every morning. 30 tablet 0   • nystatin (MYCOSTATIN) powder Apply to affected area 15 g 0     Diet:   DIET ORDERS (From admission to next 24h)     Start     Ordered    06/22/21 1632  Diet Order Diet: Cardiac  ALL MEALS     Question:  Diet:  Answer:  Cardiac    06/22/21 6259                Anticipated Discharge Destination / Patient/Family Goal:  Destination: Home with Assistance Support System: Spouse and Friends  Anticipated home health services: OT and PT  Previously used HH service/ provider: Not Applicable  Anticipated DME  Needs: Walker, Wheelchair and Life Line  Outpatient Services: OT and PT  Alternative resources to address additional identified needs:     Pre-Screen Completed: 6/28/2021 9:26 AM Sal Sawyer L.P.N.

## 2021-06-29 ENCOUNTER — APPOINTMENT (OUTPATIENT)
Dept: RADIOLOGY | Facility: MEDICAL CENTER | Age: 56
End: 2021-06-29
Attending: EMERGENCY MEDICINE
Payer: MEDICARE

## 2021-06-29 ENCOUNTER — HOSPITAL ENCOUNTER (EMERGENCY)
Facility: MEDICAL CENTER | Age: 56
End: 2021-06-29
Attending: EMERGENCY MEDICINE
Payer: MEDICARE

## 2021-06-29 VITALS
WEIGHT: 220 LBS | SYSTOLIC BLOOD PRESSURE: 113 MMHG | HEART RATE: 119 BPM | BODY MASS INDEX: 37.56 KG/M2 | TEMPERATURE: 98.8 F | RESPIRATION RATE: 20 BRPM | OXYGEN SATURATION: 93 % | HEIGHT: 64 IN | DIASTOLIC BLOOD PRESSURE: 75 MMHG

## 2021-06-29 DIAGNOSIS — S60.222A CONTUSION OF LEFT HAND, INITIAL ENCOUNTER: ICD-10-CM

## 2021-06-29 DIAGNOSIS — S06.9X0A MILD TRAUMATIC BRAIN INJURY, WITHOUT LOSS OF CONSCIOUSNESS, INITIAL ENCOUNTER (HCC): ICD-10-CM

## 2021-06-29 DIAGNOSIS — S16.1XXA STRAIN OF NECK MUSCLE, INITIAL ENCOUNTER: ICD-10-CM

## 2021-06-29 DIAGNOSIS — R55 SYNCOPE, UNSPECIFIED SYNCOPE TYPE: ICD-10-CM

## 2021-06-29 DIAGNOSIS — S80.02XA CONTUSION OF LEFT KNEE, INITIAL ENCOUNTER: ICD-10-CM

## 2021-06-29 DIAGNOSIS — G90.A POTS (POSTURAL ORTHOSTATIC TACHYCARDIA SYNDROME): ICD-10-CM

## 2021-06-29 PROBLEM — E87.5 HIGH POTASSIUM: Status: ACTIVE | Noted: 2021-06-29

## 2021-06-29 PROBLEM — R74.01 TRANSAMINITIS: Status: ACTIVE | Noted: 2021-06-29

## 2021-06-29 LAB
25(OH)D3 SERPL-MCNC: 47 NG/ML (ref 30–100)
ALBUMIN SERPL BCP-MCNC: 2.2 G/DL (ref 3.2–4.9)
ALBUMIN/GLOB SERPL: 1 G/DL
ALP SERPL-CCNC: 224 U/L (ref 30–99)
ALT SERPL-CCNC: 216 U/L (ref 2–50)
ANION GAP SERPL CALC-SCNC: 9 MMOL/L (ref 7–16)
AST SERPL-CCNC: 135 U/L (ref 12–45)
BASOPHILS # BLD AUTO: 0.7 % (ref 0–1.8)
BASOPHILS # BLD: 0.05 K/UL (ref 0–0.12)
BILIRUB SERPL-MCNC: 1.7 MG/DL (ref 0.1–1.5)
BUN SERPL-MCNC: 15 MG/DL (ref 8–22)
CALCIUM SERPL-MCNC: 8.3 MG/DL (ref 8.5–10.5)
CHLORIDE SERPL-SCNC: 108 MMOL/L (ref 96–112)
CO2 SERPL-SCNC: 22 MMOL/L (ref 20–33)
CREAT SERPL-MCNC: 0.49 MG/DL (ref 0.5–1.4)
EOSINOPHIL # BLD AUTO: 0.14 K/UL (ref 0–0.51)
EOSINOPHIL NFR BLD: 1.9 % (ref 0–6.9)
ERYTHROCYTE [DISTWIDTH] IN BLOOD BY AUTOMATED COUNT: 58.7 FL (ref 35.9–50)
EST. AVERAGE GLUCOSE BLD GHB EST-MCNC: 126 MG/DL
GLOBULIN SER CALC-MCNC: 2.2 G/DL (ref 1.9–3.5)
GLUCOSE BLD-MCNC: 90 MG/DL (ref 65–99)
GLUCOSE SERPL-MCNC: 94 MG/DL (ref 65–99)
HBA1C MFR BLD: 6 % (ref 4–5.6)
HCT VFR BLD AUTO: 40.7 % (ref 37–47)
HGB BLD-MCNC: 13.3 G/DL (ref 12–16)
IMM GRANULOCYTES # BLD AUTO: 0.03 K/UL (ref 0–0.11)
IMM GRANULOCYTES NFR BLD AUTO: 0.4 % (ref 0–0.9)
LYMPHOCYTES # BLD AUTO: 1.81 K/UL (ref 1–4.8)
LYMPHOCYTES NFR BLD: 24 % (ref 22–41)
MCH RBC QN AUTO: 30.6 PG (ref 27–33)
MCHC RBC AUTO-ENTMCNC: 32.7 G/DL (ref 33.6–35)
MCV RBC AUTO: 93.6 FL (ref 81.4–97.8)
MONOCYTES # BLD AUTO: 0.78 K/UL (ref 0–0.85)
MONOCYTES NFR BLD AUTO: 10.3 % (ref 0–13.4)
NEUTROPHILS # BLD AUTO: 4.74 K/UL (ref 2–7.15)
NEUTROPHILS NFR BLD: 62.7 % (ref 44–72)
NRBC # BLD AUTO: 0.02 K/UL
NRBC BLD-RTO: 0.3 /100 WBC
PLATELET # BLD AUTO: 165 K/UL (ref 164–446)
PMV BLD AUTO: 11.9 FL (ref 9–12.9)
POTASSIUM SERPL-SCNC: 3.9 MMOL/L (ref 3.6–5.5)
PROT SERPL-MCNC: 4.4 G/DL (ref 6–8.2)
RBC # BLD AUTO: 4.35 M/UL (ref 4.2–5.4)
SARS-COV-2 RNA RESP QL NAA+PROBE: NOTDETECTED
SODIUM SERPL-SCNC: 139 MMOL/L (ref 135–145)
SPECIMEN SOURCE: NORMAL
T4 FREE SERPL-MCNC: 1.27 NG/DL (ref 0.93–1.7)
TSH SERPL DL<=0.005 MIU/L-ACNC: 0.02 UIU/ML (ref 0.38–5.33)
WBC # BLD AUTO: 7.6 K/UL (ref 4.8–10.8)

## 2021-06-29 PROCEDURE — 97167 OT EVAL HIGH COMPLEX 60 MIN: CPT

## 2021-06-29 PROCEDURE — 82306 VITAMIN D 25 HYDROXY: CPT

## 2021-06-29 PROCEDURE — 700111 HCHG RX REV CODE 636 W/ 250 OVERRIDE (IP): Performed by: EMERGENCY MEDICINE

## 2021-06-29 PROCEDURE — 97116 GAIT TRAINING THERAPY: CPT

## 2021-06-29 PROCEDURE — 93005 ELECTROCARDIOGRAM TRACING: CPT | Performed by: EMERGENCY MEDICINE

## 2021-06-29 PROCEDURE — 99223 1ST HOSP IP/OBS HIGH 75: CPT | Performed by: HOSPITALIST

## 2021-06-29 PROCEDURE — A9270 NON-COVERED ITEM OR SERVICE: HCPCS | Performed by: HOSPITALIST

## 2021-06-29 PROCEDURE — A9270 NON-COVERED ITEM OR SERVICE: HCPCS | Performed by: PHYSICAL MEDICINE & REHABILITATION

## 2021-06-29 PROCEDURE — 700102 HCHG RX REV CODE 250 W/ 637 OVERRIDE(OP): Performed by: HOSPITALIST

## 2021-06-29 PROCEDURE — 770010 HCHG ROOM/CARE - REHAB SEMI PRIVAT*

## 2021-06-29 PROCEDURE — 97535 SELF CARE MNGMENT TRAINING: CPT

## 2021-06-29 PROCEDURE — 80053 COMPREHEN METABOLIC PANEL: CPT

## 2021-06-29 PROCEDURE — 700102 HCHG RX REV CODE 250 W/ 637 OVERRIDE(OP): Performed by: PHYSICAL MEDICINE & REHABILITATION

## 2021-06-29 PROCEDURE — 99284 EMERGENCY DEPT VISIT MOD MDM: CPT

## 2021-06-29 PROCEDURE — 97530 THERAPEUTIC ACTIVITIES: CPT

## 2021-06-29 PROCEDURE — 97110 THERAPEUTIC EXERCISES: CPT

## 2021-06-29 PROCEDURE — 94760 N-INVAS EAR/PLS OXIMETRY 1: CPT

## 2021-06-29 PROCEDURE — 85025 COMPLETE CBC W/AUTO DIFF WBC: CPT

## 2021-06-29 PROCEDURE — 97163 PT EVAL HIGH COMPLEX 45 MIN: CPT

## 2021-06-29 PROCEDURE — 72125 CT NECK SPINE W/O DYE: CPT | Mod: MH

## 2021-06-29 PROCEDURE — 84443 ASSAY THYROID STIM HORMONE: CPT

## 2021-06-29 PROCEDURE — 84439 ASSAY OF FREE THYROXINE: CPT

## 2021-06-29 PROCEDURE — 99233 SBSQ HOSP IP/OBS HIGH 50: CPT | Performed by: PHYSICAL MEDICINE & REHABILITATION

## 2021-06-29 PROCEDURE — 83036 HEMOGLOBIN GLYCOSYLATED A1C: CPT

## 2021-06-29 PROCEDURE — 82962 GLUCOSE BLOOD TEST: CPT

## 2021-06-29 PROCEDURE — 70450 CT HEAD/BRAIN W/O DYE: CPT

## 2021-06-29 RX ORDER — HYDROCORTISONE 10 MG/1
10 TABLET ORAL DAILY
Status: DISCONTINUED | OUTPATIENT
Start: 2021-06-29 | End: 2021-06-30 | Stop reason: HOSPADM

## 2021-06-29 RX ORDER — FLUDROCORTISONE ACETATE 0.1 MG/1
0.1 TABLET ORAL EVERY MORNING
Status: DISCONTINUED | OUTPATIENT
Start: 2021-06-30 | End: 2021-06-29

## 2021-06-29 RX ORDER — ONDANSETRON 4 MG/1
4 TABLET, ORALLY DISINTEGRATING ORAL ONCE
Status: COMPLETED | OUTPATIENT
Start: 2021-06-29 | End: 2021-06-29

## 2021-06-29 RX ORDER — FLUDROCORTISONE ACETATE 0.1 MG/1
0.1 TABLET ORAL EVERY MORNING
Status: DISCONTINUED | OUTPATIENT
Start: 2021-06-30 | End: 2021-06-30 | Stop reason: HOSPADM

## 2021-06-29 RX ORDER — HYDROCORTISONE 10 MG/1
20 TABLET ORAL DAILY
Status: DISCONTINUED | OUTPATIENT
Start: 2021-06-30 | End: 2021-06-30 | Stop reason: HOSPADM

## 2021-06-29 RX ADMIN — ACETAMINOPHEN 650 MG: 325 TABLET, FILM COATED ORAL at 03:33

## 2021-06-29 RX ADMIN — APIXABAN 5 MG: 5 TABLET, FILM COATED ORAL at 20:57

## 2021-06-29 RX ADMIN — COLESEVELAM HYDROCHLORIDE 625 MG: 625 TABLET, FILM COATED ORAL at 20:56

## 2021-06-29 RX ADMIN — ONDANSETRON 4 MG: 4 TABLET, ORALLY DISINTEGRATING ORAL at 09:00

## 2021-06-29 RX ADMIN — DULOXETINE HYDROCHLORIDE 60 MG: 30 CAPSULE, DELAYED RELEASE ORAL at 20:56

## 2021-06-29 RX ADMIN — COLESEVELAM HYDROCHLORIDE 625 MG: 625 TABLET, FILM COATED ORAL at 11:13

## 2021-06-29 RX ADMIN — CALCITONIN SALMON 200 UNITS: 200 SPRAY, METERED NASAL at 20:59

## 2021-06-29 RX ADMIN — AMITRIPTYLINE HYDROCHLORIDE 10 MG: 10 TABLET, FILM COATED ORAL at 11:08

## 2021-06-29 RX ADMIN — CHOLECALCIFEROL TAB 25 MCG (1000 UNIT) 2000 UNITS: 25 TAB at 11:12

## 2021-06-29 RX ADMIN — HYDROCORTISONE 10 MG: 10 TABLET ORAL at 11:16

## 2021-06-29 RX ADMIN — GABAPENTIN 400 MG: 400 CAPSULE ORAL at 11:09

## 2021-06-29 RX ADMIN — GABAPENTIN 400 MG: 400 CAPSULE ORAL at 20:56

## 2021-06-29 RX ADMIN — NYSTATIN: 100000 POWDER TOPICAL at 11:29

## 2021-06-29 RX ADMIN — LIOTHYRONINE SODIUM 25 MCG: 25 TABLET ORAL at 20:58

## 2021-06-29 RX ADMIN — APIXABAN 5 MG: 5 TABLET, FILM COATED ORAL at 11:11

## 2021-06-29 RX ADMIN — COLESEVELAM HYDROCHLORIDE 625 MG: 625 TABLET, FILM COATED ORAL at 15:47

## 2021-06-29 RX ADMIN — HYDROCORTISONE 10 MG: 10 TABLET ORAL at 17:35

## 2021-06-29 RX ADMIN — LEVOTHYROXINE SODIUM 75 MCG: 75 TABLET ORAL at 06:03

## 2021-06-29 RX ADMIN — ACETAMINOPHEN 650 MG: 325 TABLET, FILM COATED ORAL at 15:47

## 2021-06-29 RX ADMIN — FLUDROCORTISONE ACETATE 0.1 MG: 0.1 TABLET ORAL at 11:12

## 2021-06-29 RX ADMIN — LOSARTAN POTASSIUM 25 MG: 25 TABLET, FILM COATED ORAL at 06:03

## 2021-06-29 RX ADMIN — MONTELUKAST 10 MG: 10 TABLET, FILM COATED ORAL at 20:58

## 2021-06-29 RX ADMIN — OMEPRAZOLE 20 MG: 20 CAPSULE, DELAYED RELEASE ORAL at 11:13

## 2021-06-29 ASSESSMENT — ACTIVITIES OF DAILY LIVING (ADL)
TUB_SHOWER_TRANSFER_DESCRIPTION: GRAB BAR;SHOWER BENCH;INCREASED TIME;INITIAL PREPARATION FOR TASK;SET-UP OF EQUIPMENT;SUPERVISION FOR SAFETY;VERBAL CUEING
TOILET_TRANSFER_DESCRIPTION: GRAB BAR;INCREASED TIME;SET-UP OF EQUIPMENT;SUPERVISION FOR SAFETY;VERBAL CUEING
BED_CHAIR_WHEELCHAIR_TRANSFER_DESCRIPTION: INCREASED TIME;VERBAL CUEING
TOILETING_LEVEL_OF_ASSIST_DESCRIPTION: ASSIST FOR STANDING BALANCE;GRAB BAR;INCREASED TIME;SET-UP OF EQUIPMENT;SUPERVISION FOR SAFETY;VERBAL CUEING
BED_CHAIR_WHEELCHAIR_TRANSFER_DESCRIPTION: INCREASED TIME;SET-UP OF EQUIPMENT;SUPERVISION FOR SAFETY;VERBAL CUEING
TOILET_TRANSFER_DESCRIPTION: GRAB BAR;INCREASED TIME;SET-UP OF EQUIPMENT;SUPERVISION FOR SAFETY;VERBAL CUEING
TOILETING: INDEPENDENT
BED_CHAIR_WHEELCHAIR_TRANSFER_DESCRIPTION: ADAPTIVE EQUIPMENT;INCREASED TIME;SET-UP OF EQUIPMENT;SUPERVISION FOR SAFETY;VERBAL CUEING

## 2021-06-29 ASSESSMENT — BRIEF INTERVIEW FOR MENTAL STATUS (BIMS)
ASKED TO RECALL BLUE: YES, NO CUE REQUIRED
ASKED TO RECALL BED: YES, NO CUE REQUIRED
ASKED TO RECALL SOCK: YES, NO CUE REQUIRED
WHAT MONTH IS IT: ACCURATE WITHIN 5 DAYS
WHAT YEAR IS IT: CORRECT
INITIAL REPETITION OF BED BLUE SOCK - FIRST ATTEMPT: 3
BIMS SUMMARY SCORE: 15
WHAT DAY OF THE WEEK IS IT: CORRECT

## 2021-06-29 ASSESSMENT — GAIT ASSESSMENTS
GAIT LEVEL OF ASSIST: CONTACT GUARD ASSIST
ASSISTIVE DEVICE: OTHER (COMMENTS)
DISTANCE (FEET): 45
DISTANCE (FEET): 50
GAIT LEVEL OF ASSIST: CONTACT GUARD ASSIST
DEVIATION: TRENDELENBERG;INCREASED BASE OF SUPPORT;BRADYKINETIC;DECREASED HEEL STRIKE;DECREASED TOE OFF

## 2021-06-29 ASSESSMENT — FIBROSIS 4 INDEX: FIB4 SCORE: 3.12

## 2021-06-29 ASSESSMENT — PAIN DESCRIPTION - PAIN TYPE: TYPE: ACUTE PAIN

## 2021-06-29 NOTE — ED TRIAGE NOTES
54 y/o female bib ambulance from [facility] after pt had a possible syncopal episode today causing her to strike her face on the tile floor. Pt states this occurred after she showered this morning, she leaned forward to dry her hair and the next thing she remembers is people standing over her while she was laying on the floor. Abrasions and bruising noted to face. Pt takes Eliquis for PE, dx in May.

## 2021-06-29 NOTE — ED NOTES
Pt awake, lying in bed, speaking without signs of distress. States discomfort to right lateral area of neck as well as headache. A/Ox4.

## 2021-06-29 NOTE — THERAPY
Occupational Therapy  Daily Treatment     Patient Name: Donna Isaac  Age:  55 y.o., Sex:  female  Medical Record #: [id number]  Today's Date: 6/29/2021     Precautions  Precautions: (P) Fall Risk, Other (See Comments)  Comments: (P) Orthostatic Hypotension, hx of falls,  abdominal binder OOB, R ankle brace, [place] (deaf R ear)    Safety   ADL Safety : (P) Requires Supervision for Safety  Bathroom Safety: (P) Requires Supervision for Safety  Comments: (P) see below functional levels for ADL performance details.     Subjective    Pt agreeable to OT session.     Objective       06/29/21 1431   Precautions   Precautions Fall Risk;Other (See Comments)   Comments Orthostatic Hypotension, hx of falls,  abdominal binder OOB, R ankle brace, [place] (deaf R ear)   Safety    ADL Safety  Requires Supervision for Safety   Bathroom Safety Requires Supervision for Safety   Comments see below functional levels for ADL performance details.    Cognition    Level of Consciousness Alert   Functional Level of Assist   Grooming Supervision;Seated   Toileting Minimal Assist   Toileting Description Assist for standing balance;Grab bar;Increased time;Set-up of equipment;Supervision for safety;Verbal cueing   Bed, Chair, Wheelchair Transfer Contact Guard Assist   Bed Chair Wheelchair Transfer Description Increased time;Set-up of equipment;Supervision for safety;Verbal cueing   Toilet Transfers Minimal Assist   Toilet Transfer Description Grab bar;Increased time;Set-up of equipment;Supervision for safety;Verbal cueing  (w/c<>toilet)   Sitting Upper Body Exercises   Chest Press 3 sets of 10;Bilateral;Weight (See Comments for lbs)   Front Arm Raise 3 sets of 10;Right ;Left;Weight (See Comments for lbs)   Internal Shoulder Rotation 3 sets of 10;Bilateral;Weight (See Comments for lbs)   External Shoulder Rotation 3 sets of 10;Bilateral;Weight (See Comments for lbs)   Bicep Curls 3 sets of 10;Right ;Left;Weight (See Comments for lbs)   Comments 3#  weight used for UB exercises; reports hx of bilateral rotator cuff tears.   Bed Mobility    Supine to Sit Supervised   Sit to Supine Supervised   Scooting Supervised   Rolling Supervised   Skilled Intervention Verbal Cuing   Interdisciplinary Plan of Care Collaboration   IDT Collaboration with  Nursing   Patient Position at End of Therapy In Bed;Bed Alarm On;Call Light within Reach;Tray Table within Reach;Phone within Reach   Collaboration Comments RN re: pt requesting tylenol, had BM during session   OT Total Time Spent   OT Individual Total Time Spent (Mins) 30   OT Charge Group   OT Self Care / ADL 1   OT Therapeutic Exercise  1     Provided pt with reacher, as she reports dizziness when bending down to  items. She may also benefit from AE education for LB dressing.     Assessment    Pt tolerated OT session well. She reports hx of bilateral rotator cuff injuries, and exercises modified to pt tolerance. She required supv for safety with mobility and ADLs d/t impaired balance and hx of syncope due to orthostatic hypotension.   Strengths: Pleasant and cooperative, Willingly participates in therapeutic activities  Barriers: Decreased endurance, Fatigue, Generalized weakness, Hearing impairment, Orthostatic hypotension, Impaired activity tolerance, Impaired balance, Limited mobility    Plan    ADLs, IADLs, functional mobility, AE education, strength/endurance building, balance.     Passport items to be completed:  Perform bathroom transfers, complete dressing, complete feeding, get ready for the day, prepare a simple meal, participate in household tasks, adapt home for safety needs, demonstrate home exercise program, complete caregiver training     Occupational Therapy Goals (Active)     Problem: Dressing     Dates: Start: 06/29/21       Goal: STG-Within one week, patient will dress LB     Dates: Start: 06/29/21       Goal Note filed on 06/29/21 0823 by Dennis Alcantar MS,OTR/L     1) Individualized Goal:  Mod  assist Lower Body Clothing Management via AE/DME PRN  2) Interventions:  OT Self Care/ADL, OT Neuro Re-Ed/Balance, OT Therapeutic Activity, OT Evaluation, and OT Therapeutic Exercise               Problem: Functional Transfers     Dates: Start: 06/29/21       Goal: STG-Within one week, patient will transfer to toilet     Dates: Start: 06/29/21       Goal Note filed on 06/29/21 0823 by Dennis Alcantar MS,OTR/L     1) Individualized Goal:  Sup/SBA for commode transfer via DME PRN  2) Interventions:  OT Self Care/ADL, OT Neuro Re-Ed/Balance, OT Therapeutic Activity, OT Evaluation, and OT Therapeutic Exercise               Problem: OT Long Term Goals     Dates: Start: 06/29/21       Goal: LTG-By discharge, patient will complete basic self care tasks     Dates: Start: 06/29/21       Goal Note filed on 06/29/21 0823 by Dennis Alcantar MS,OTR/L     1) Individualized Goal:  Mod I for BADL's via AE/DME PRN  2) Interventions:  OT Self Care/ADL, OT Neuro Re-Ed/Balance, OT Therapeutic Activity, OT Evaluation, and OT Therapeutic Exercise            Goal: LTG-By discharge, patient will perform bathroom transfers     Dates: Start: 06/29/21       Goal Note filed on 06/29/21 0823 by Dennis Alcantar MS,OTR/L     1) Individualized Goal:  Mod I for bathroom transfers via DME PRN  2) Interventions: OT Self Care/ADL, OT Neuro Re-Ed/Balance, OT Therapeutic Activity, OT Evaluation, and OT Therapeutic Exercise               Problem: Toileting     Dates: Start: 06/29/21       Goal: STG-Within one week, patient will complete toileting tasks     Dates: Start: 06/29/21       Goal Note filed on 06/29/21 0823 by Dennis Alcantar MS,OTR/L     1) Individualized Goal:  Sup/SBA for toileting task via DME PRN  2) Interventions:  OT Self Care/ADL, OT Neuro Re-Ed/Balance, OT Therapeutic Activity, OT Evaluation, and OT Therapeutic Exercise
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
## 2021-06-29 NOTE — THERAPY
"Occupational Therapy   Initial Evaluation     Patient Name: Donna Isaac  Age:  55 y.o., Sex:  female  Medical Record #: [id number]  Today's Date: 6/29/2021     Subjective    \"Good morning\"  Pt in bed, alert and cooperative with occupational therapy evaluation.     Objective       06/29/21 0701   Prior Living Situation   Housing / Facility 2 Story House   Steps Into Home 2   Steps In Home 16  (8 steps w/ L [id number] steps R rail)   Rail   (8 steps w/ L [id number]-5 steps R rail)   Elevator No   Bathroom Set up Bathtub / Shower Combination;Walk In Shower;Shower Glass Doors;Shower Chair   Equipment Owned None  (Borrowing FWW, 4WW, shower chair)   Lives with - Patient's Self Care Capacity Spouse;Unrelated Adult   Comments Pt and  relocated to [place] from [place].  Currently staying at friend's home until signing of new single level home   Prior Level of ADL Function   Self Feeding Independent   Grooming / Hygiene Independent   Bathing Requires Assist   Dressing Requires Assist   Toileting Independent   Comments   (Pt reported fractured R ankle and L fibula in fall in [place])   Prior Level of IADL Function   Medication Management Independent   Laundry Requires Assist   Kitchen Mobility Independent  (DME)   Finances Independent   Home Management Requires Assist   Shopping Requires Assist   Prior Level Of Mobility Independent With Device in Home   Driving / Transportation Relatives / Others Provide Transportation   Prior Functioning: Everyday Activities   Self Care Needed some help   Indoor Mobility (Ambulation) Independent   Functional Cognition Independent   Vitals   Pulse 74   Patient BP Position Sitting   Blood Pressure 128/64   Respiration 19   Pulse Oximetry 92 %   O2 Delivery Device None - Room Air   Vitals Comments Vitals taken while seated at EOB post [facility] to [facility] from bed   Cognition    Cognition / Consciousness WDL   Orientation Level Oriented x 4   Level of Consciousness " "Alert   Safety Awareness Impaired   ABS (Agitated Behavior Scale)   Agitated Behavior Scale Performed No   Cognitive Pattern Assessment   Cognitive Pattern Assessment Used BIMS   Brief Interview for Mental Status (BIMS)   Repetition of Three Words (First Attempt) 3   Temporal Orientation: Year Correct   Temporal Orientation: Month Accurate within 5 days   Temporal Orientation: Day Correct   Recall: \"Sock\" Yes, no cue required   Recall: \"Blue\" Yes, no cue required   Recall: \"Bed\" Yes, no cue required   BIMS Summary Score 15   Vision Screen   Vision Not tested   Passive ROM Upper Body   Passive ROM Upper Body WDL   Active ROM Upper Body   Active ROM Upper Body  WDL   Dominant Hand Right   Strength Upper Body   Upper Body Strength  X   Comments Generalized weakness billaterally   Sensation Upper Body   Upper Extremity Sensation  WDL   Upper Body Muscle Tone   Upper Body Muscle Tone  WDL   Bed Mobility    Supine to Sit Supervised   Sit to Stand Minimal Assist   Coordination Upper Body   Coordination WDL   Eating   Assistance Needed Independent   Physical Assistance Level No physical assistance   CARE Score 6   Eating Discharge Goal   Discharge Goal 6   Oral Hygiene   Assistance Needed Set-up / clean-up;Supervision   Physical Assistance Level No physical assistance   CARE Score 4   Oral Hygiene Discharge Goal   Discharge Goal 6   Shower/Bathe Self   Assistance Needed Set-up / clean-up;Supervision;Verbal cues;Physical assistance;Adaptive equipment   Physical Assistance Level 26%-50%   CARE Score 3   Shower/Bathe Self Discharge Goal   Discharge Goal 6   Upper Body Dressing   Assistance Needed Set-up / clean-up;Supervision;Physical assistance;Verbal cues   Physical Assistance Level 25% or less   CARE Score 3   Upper Body Dressing Discharge Goal   Discharge Goal 6   Lower Body Dressing   Assistance Needed Physical assistance;Verbal cues;Supervision;Set-up / clean-up;Adaptive equipment   Physical Assistance Level 51%-75% "   CARE Score 2   Lower Body Dressing Discharge Goal   Discharge Goal 6   Putting On/Taking Off Footwear   Assistance Needed Physical assistance;Verbal cues   Physical Assistance Level Total assistance   CARE Score 1   Putting On/Taking Off Footwear Discharge Goal   Discharge Goal 6   Toileting Hygiene   Assistance Needed Physical assistance;Verbal cues;Supervision;Set-up / clean-up;Adaptive equipment   Physical Assistance Level 25% or less   CARE Score 3   Toileting Hygiene Discharge Goal   Discharge Goal 6   Toilet Transfer   Assistance Needed Physical assistance;Verbal cues;Supervision;Set-up / clean-up;Adaptive equipment   Physical Assistance Level 25% or less   CARE Score 3   Toilet Transfer Discharge Goal   Discharge Goal 6   Hearing, Speech, and Vision   Expression of Ideas and Wants Without difficulty   Understanding Verbal and Non-Verbal Content Understands   Functional Level of Assist   Eating Modified Independent   Eating Description Increased time   Grooming Supervision;Seated   Grooming Description Supervision for safety;Seated in wheelchair at sink;Increased time   Bathing Moderate Assist   Bathing Description Grab bar;Hand held shower;Tub bench;Assit wtih lower extremities;Assit with perineal;Increased time;Initial preparation for task;Set-up of equipment;Supervision for safety;Verbal cueing   Upper Body Dressing Minimal Assist   Upper Body Dressing Description Increased time;Initial preparation for task;Set-up of equipment;Supervision for safety;Verbal cueing   Lower Body Dressing Maximal Assist   Lower Body Dressing Description Grab bar;Assist with threading into pant leg;Increased time;Initial preparation for task;Set-up of equipment;Supervision for safety;Verbal cueing   Toileting Minimal Assist   Toileting Description Grab bar;Increased time;Initial preparation for task;Set-up of equipment;Supervision for safety;Verbal cueing;Assist to pull pants up   Toilet Transfers Minimal Assist  (wc/commode)    Toilet Transfer Description Grab bar;Increased time;Set-up of equipment;Supervision for safety;Verbal cueing   Tub / Shower Transfers Minimal Assist  (wc/shower bench)   Tub Shower Transfer Description Grab bar;Shower bench;Increased time;Initial preparation for task;Set-up of equipment;Supervision for safety;Verbal cueing   Comprehension Modified Independent   Comprehension Description Hearing aids/amplifiers   Expression Independent   Problem Solving Modified Independent   Problem Solving Description Therapy schedule   Memory Modified Independent   Memory Description Therapy schedule   Problem List   Problem List Decreased Active Daily Living Skills;Decreased Homemaking Skills;Decreased Upper Extremity Strength Right;Decreased Upper Extremity Strength Left;Decreased Functional Mobility;Decreased Activity Tolerance;Safety Awareness Deficits / Cognition;Impaired Postural Control / Balance   Precautions   Precautions Fall Risk;Other (See Comments)   Comments Orthostatic Hypotension, hx of falls,  abdominal binder OOB, R ankle brace, [place] (deaf R ear)   Current Discharge Plan   Current Discharge Plan Return to Prior Living Situation   Benefit    Therapy Benefit Patient Would Benefit from Inpatient Rehab Occupational Therapy to Maximize [place] with ADLs, IADLs and Functional Mobility.   Interdisciplinary Plan of Care Collaboration   IDT Collaboration with  Certified Nursing Assistant;Nursing;Physician;Physical Therapist   Patient Position at End of Therapy In Bed   Collaboration Comments Noted orthostatc hypotesnisve event resulting in fall - refer to notes   Strengths & Barriers   Strengths Pleasant and cooperative;Willingly participates in therapeutic activities   Barriers Decreased endurance;Fatigue;Generalized weakness;Hearing impairment;Orthostatic hypotension;Impaired activity tolerance;Impaired balance;Limited mobility   OT Total Time Spent   OT Individual Total Time Spent (Mins) 60   OT Charge Group   OT  Self Care / ADL 1   OT Evaluation OT Evaluation High       Assessment  Patient is a 55 y.o. female with a past medical history significant for sCHF, HTN, Hypothyroidism, Adrenal insufficiency, depression, and Hx of DVT/PE admitted to [facility] on 6/22/2021 12:49 PM with transient LOC. Patient has reportedly been hospitalized multiple times in the past month for syncopal work-up, including 5/10-5/15, 5/21-5/23, 5/25-6/3, and 6/15-6/18.  Per report patient had multiple episodes of LOC lasting between 2 and 3 minutes after changing position concerning for orthostatic hypotension. CT head was negative for acute abnormality. Per primary team concern for volume depletion as well as autonomic disorder in setting of adrenal insufficiency..  Additional factors influencing patient status / progress (ie: cognitive factors, co-morbidities, social support, etc): Valentín's Disease, Hypothyroidism, CKD, Anemia, Depression, MRSA sinusitis, GERD, Hx of PE, Hx of falls.   Patient reported fall in December 2020 resulting in R ankle fracture and Left fibula fx. Pt R ankle brace and abdominal binder OOB.  At time of occupational therapy evaluation patient presented below baseline for ADL's/IADL's, functional mobility and transfers.  Barriers include orthostatic hypotension, hx of falls, generalized weakness, impaired balance and endurance.  Towards end of OT evaluation session patient LOC/orthostatic resulting in LOB/fall and transfer to main hospital for follow up.     Plan  Recommend Occupational Therapy 30-60 minutes per day 5-7 days per week for 2 weeks for the following treatments:  OT Self Care/ADL, OT Neuro Re-Ed/Balance, OT Therapeutic Activity, OT Evaluation and OT Therapeutic Exercise.    Passport items to be completed:  Passport items to be completed:  Perform bathroom transfers, complete dressing, complete feeding, get ready for the day, prepare a simple meal, participate in household tasks, adapt home for safety  needs, demonstrate home exercise program, complete caregiver training     Goals:  Long term and short term goals have been discussed with patient and they are in agreement.    Occupational Therapy Goals (Active)     Problem: Dressing     Dates: Start: 06/29/21       Goal: STG-Within one week, patient will dress LB     Dates: Start: 06/29/21       Goal Note filed on 06/29/21 0823 by Dennis Alcantar MS,OTR/L     1) Individualized Goal:  Mod assist Lower Body Clothing Management via AE/DME PRN  2) Interventions:  OT Self Care/ADL, OT Neuro Re-Ed/Balance, OT Therapeutic Activity, OT Evaluation, and OT Therapeutic Exercise               Problem: Functional Transfers     Dates: Start: 06/29/21       Goal: STG-Within one week, patient will transfer to toilet     Dates: Start: 06/29/21       Goal Note filed on 06/29/21 0823 by Dennis Alcantar MS,OTR/L     1) Individualized Goal:  Sup/SBA for commode transfer via DME PRN  2) Interventions:  OT Self Care/ADL, OT Neuro Re-Ed/Balance, OT Therapeutic Activity, OT Evaluation, and OT Therapeutic Exercise               Problem: OT Long Term Goals     Dates: Start: 06/29/21       Goal: LTG-By discharge, patient will complete basic self care tasks     Dates: Start: 06/29/21       Goal Note filed on 06/29/21 0823 by Dennis Alcantar MS,OTR/L     1) Individualized Goal:  Mod I for BADL's via AE/DME PRN  2) Interventions:  OT Self Care/ADL, OT Neuro Re-Ed/Balance, OT Therapeutic Activity, OT Evaluation, and OT Therapeutic Exercise            Goal: LTG-By discharge, patient will perform bathroom transfers     Dates: Start: 06/29/21       Goal Note filed on 06/29/21 0823 by Dennis Alcantar MS,OTR/L     1) Individualized Goal:  Mod I for bathroom transfers via DME PRN  2) Interventions: OT Self Care/ADL, OT Neuro Re-Ed/Balance, OT Therapeutic Activity, OT Evaluation, and OT Therapeutic Exercise               Problem: Toileting     Dates: Start: 06/29/21       Goal: STG-Within one week, patient will  complete toileting tasks     Dates: Start: 06/29/21       Goal Note filed on 06/29/21 0823 by Dennis Alcantar MS,OTR/L     1) Individualized Goal:  Sup/SBA for toileting task via DME PRN  2) Interventions:  OT Self Care/ADL, OT Neuro Re-Ed/Balance, OT Therapeutic Activity, OT Evaluation, and OT Therapeutic Exercise

## 2021-06-29 NOTE — DISCHARGE INSTRUCTIONS
Head Injuries, Adult    Return for worsening headache, repeated vomiting, confusion, seizure, unequal pupils or difficulty arousing from sleep.  Avoid activities that may cause a repeat concussion for 2 weeks.  Take Tylenol as needed for pain    You had a borderline or high normal blood pressure reading today.  This does not necessarily mean you have hypertension.  Please followup with your/a primary physician for comprehensive blood pressure evaluation and yearly fasting cholesterol assessment.  BP Readings from Last 3 Encounters:   06/29/21 120/73   06/29/21 136/61   06/28/21 106/74         You have had a head injury which does not appear serious at this time. A concussion is a state of changed mental ability, usually from a blow to the head. You should take clear liquids for the rest of the day and then resume your regular diet. You should not take sedatives or alcoholic beverages for 48 hours after discharge. After injuries such as yours, most problems occur within the first 24 hours.    THESE MINOR SYMPTOMS MAY BE seen AFTER DISCHARGE:  Memory difficulties  Dizziness  Headaches Double vision  Hearing difficulties   Depression Tiredness  Weakness  Difficulty with concentration      If you experience any of these problems, you should not be alarmed. A bruise on the brain (concussion) requires a few days for recovery. This is the same as a bruise elsewhere on the body. Many patients with head injuries frequently experience such symptoms. Usually, these problems disappear without medical care. If symptoms last for more than one day, notify your caregiver. See your caregiver sooner if symptoms are becoming worse rather than better.    HOME CARE INSTRUCTIONS  During the next 24 hours you must stay with someone who can watch you for the above warning signs.  This person should wake you up every 30 minutes for 3 hours or as directed to check on your condition, noting any of the above signs or symptoms. Problems which  are getting worse mean you should call or return immediately to the facility where you were just seen, or to the nearest emergency department. In case of emergency or unconsciousness, dial 911.    Although it is unlikely that serious side effects will occur, you should be aware of signs and symptoms which may necessitate your return to this location. Side effects may occur up to 7 - 10 days following the injury.  It is important for you to carefully monitor your condition and contact your caregiver or seek immediate medical attention if there is a change in your condition.    seek immediate medical attention if:  There is confusion or drowsiness.   You can not awaken the injured person.  There is nausea (feeling sick to your stomach) or continued, forceful vomiting.  You notice dizziness or unsteadiness which is getting worse, or inability to walk.  You have convulsions or unconsciousness.  You experience severe, persistent headaches not relieved by Tylenol®. (Do not take aspirin as this impairs clotting abilities). Take other pain medications only as directed.  You can not use arms or legs normally.  There are changes in pupil sizes. (This is the black center in the colored part of the eye)  There is clear or bloody discharge from the nose or ears.    AGREEMENT BETWEEN PATIENT AND HEALTHCARE TEAM:  Your signature on this document represents an understanding between you and the healthcare team that took care of you today.  That means that you:  Understand these discharge instructions.   Will monitor your condition.  Will seek immediate medical attention as instructed.    Document Released: 12/18/2006  Document Re-Released: 06/30/2008  [employer]® Patient Information ©2009 [employer].
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
## 2021-06-29 NOTE — ASSESSMENT & PLAN NOTE
Has hx  Started about 5 years ago in [place] and then seemed to resolve  Pt states that over the last few months it has gotten worse  Has recent hx of mult hosp adms for syncope  Had syncope on am 6/29 while sitting in the shower and hit head -- CT head in ER ok  ? 2nd to AI and Orthostatic hypotension  Has abn TFT's -- see other assessment  Has recent PE and is on Eliquis  BNP: 176 (5/10) --> 20,599 (5/14) --> 2165 (5/25) --> 883 (6/30) --> 823 (7/3)  Echo (5/10): EF > 70%  TropI: has multiple studies recently -- unremarkable  TFT's -- see other assessment  Off Cozaar (was on for a few months) -- has SE of orthostatic hypotension  On Cortef: 20 mg qam and 10 mg afternoon  On Florinef  Note: to f/u with her Neurologist for autonomic disorder  Monitor

## 2021-06-29 NOTE — PROGRESS NOTES
Pt had a fall in the shower during OT. Pt hit her head and complains of pain in her L wrist and and L knee. Called Dr. Beasley who gave phone order to send the patient to the ER. Pt left with [facility] at 0810. Pt declined for staff to call her . She states he is busy and she will call him on her cell phone later.

## 2021-06-29 NOTE — FLOWSHEET NOTE
06/29/21 0751   Events/Summary/Plan   Events/Summary/Plan rapid response for fall with LOC.  SATS 90% and pt feels SOB:  placed on 2 lpm for SATS of 96%

## 2021-06-29 NOTE — ASSESSMENT & PLAN NOTE
May be etiology of her syncope  Has chronic history, suspect 2/2 AI  Florinef increased from qd to bid dosing  Continue Hydrocortisone  Now also on Midodrine per Physiatry  Off Losartan and Coreg

## 2021-06-29 NOTE — DISCHARGE PLANNING
note:  Met with pt who is agreeable to return to [facility].   Pt will notify her .   Dr. Larson signed the COBRA.   COBRA left in nursing station and RN to have pt sign and RN to witness.     CM still waiting for  time.

## 2021-06-29 NOTE — THERAPY
Physical Therapy   Initial Evaluation     Patient Name: Donna Isaac  Age:  55 y.o., Sex:  female  Medical Record #: [id number]  Today's Date: 6/29/2021     Subjective    Pt reports she is agreeable to PT eval     Objective       06/29/21 1231   Prior Living Situation   Prior Services Home-Independent   Housing / Facility 2 Story House   Steps Into Home 2   Steps In Home 16   Rail Both Rail (Steps in Home)  (Split half way up)   Equipment Owned None  (Borrowing FWW, 4WW, shower chair)   Lives with - Patient's Self Care Capacity Spouse;Unrelated Adult   Comments  Currently staying at friend's home until signing of new single level home till september, pt was driving and working before symptoms increased    Prior Level of Functional Mobility   Bed Mobility Independent   Transfer Status Independent   Ambulation Independent   Distance Ambulation (Feet) 1000   Assistive Devices Used None   Wheelchair Other (Comments)  (NA)   Stairs Independent   Prior Functioning: Everyday Activities   Indoor Mobility (Ambulation) Independent   Stairs Independent   Prior Device Use None of the given options   Passive ROM Lower Body   Passive ROM Lower Body WDL   Active ROM Lower Body    Active ROM Lower Body  X   Comments limited hip mobility dt strength defcits   Strength Lower Body   Lower Body Strength  X   Comments B hips <3/5 strength, knees ankles 4/5 quick screen overall   Sensation Lower Body   Lower Extremity Sensation   WDL   Lower Body Muscle Tone   Lower Body Muscle Tone  WDL   Balance Assessment   Sitting Balance (Static) Fair -   Sitting Balance (Dynamic) Fair -   Standing Balance (Static) Poor +   Standing Balance (Dynamic) Poor -   Weight Shift Sitting Good   Weight Shift Standing Fair   Bed Mobility    Supine to Sit Minimal Assist   Sit to Supine Minimal Assist   Sit to Stand Minimal Assist   Scooting Supervised   Rolling Minimal Assist to Rt.;Minimum Assist to Lt.   Neurological Concerns   Neurological Concerns No    Coordination Lower Body    Coordination Lower Body  X   Heel To Shin Right Impaired   Heel To Shin Left Impaired   Roll Left and Right   Assistance Needed Physical assistance   Physical Assistance Level 25% or less   CARE Score 3   Roll Left and Right Discharge Goal   Discharge Goal 6   Sit to Lying   Assistance Needed Physical assistance   Physical Assistance Level 25% or less   CARE Score 3   Sit to Lying Discharge Goal   Discharge Goal 6   Lying to Sitting on Side of Bed   Assistance Needed Physical assistance   Physical Assistance Level 25% or less   CARE Score 3   Lying to Sitting on Side of Bed Discharge Goal   Discharge Goal 6   Sit to Stand   Assistance Needed Physical assistance   Physical Assistance Level 25% or less   CARE Score 3   Sit to Stand Discharge Goal   Discharge Goal 6   Chair/Bed-to-Chair Transfer   Assistance Needed Physical assistance   Physical Assistance Level 25% or less   CARE Score 3   Chair/Bed-to-Chair Transfer Discharge Goal   Discharge Goal 6   Car Transfer   Reason if not Attempted Safety concerns   CARE Score 88   Car Transfer Discharge Goal   Discharge Goal 6   Walk 10 Feet   Assistance Needed Physical assistance  (wc folow)   Physical Assistance Level Total assistance   CARE Score 1   Walk 10 Feet Discharge Goal   Discharge Goal 6   Walk 50 Feet with Two Turns   Assistance Needed Physical assistance   Physical Assistance Level Total assistance   CARE Score 1   Walk 50 Feet with Two Turns Discharge Goal   Discharge Goal 6   Walk 150 Feet   Reason if not Attempted Safety concerns   CARE Score 88   Walk 150 Feet Discharge Goal   Discharge Goal 6   Walking 10 Feet on Uneven Surfaces   Reason if not Attempted Safety concerns   CARE Score 88   Walking 10 Feet on Uneven Surfaces Discharge Goal   Discharge Goal 6   1 Step (Curb)   Reason if not Attempted Safety concerns   CARE Score 88   1 Step (Curb) Discharge Goal   Discharge Goal 6   4 Steps   Reason if not Attempted Safety concerns    CARE Score 88   4 Steps Discharge Goal   Discharge Goal 6   12 Steps   Reason if not Attempted Safety concerns   CARE Score 88   12 Steps Discharge Goal   Discharge Goal 6   Picking Up Object   Reason if not Attempted Safety concerns   CARE Score 88   Picking Up Object Discharge Goal   Discharge Goal 6   Wheel 50 Feet with Two Turns   Reason if not Attempted Activity not applicable   CARE Score 9   Wheel 50 Feet with Two Turns Discharge Goal   Discharge Goal 9   Wheel 150 Feet   Reason if not Attempted Activity not applicable   CARE Score 9   Wheel 150 Feet Discharge Goal   Discharge Goal 9   Gait Functional Level of Assist    Gait Level Of Assist Contact Guard Assist  (wc follow for safety)   Assistive Device None   Distance (Feet) 50   # of Times Distance was Traveled 1   Deviation Trendelenberg;Shuffled Gait;Bradykinetic   Stairs Functional Level of Assist   Level of Assist with Stairs Unable to Participate   Transfer Functional Level of Assist   Bed, Chair, Wheelchair Transfer Minimal Assist   Bed Chair Wheelchair Transfer Description Increased time;Verbal cueing   Problem List    Problems Impaired Bed Mobility;Impaired Transfers;Impaired Ambulation;Functional Strength Deficit;Impaired Balance;Impaired Coordination   Precautions   Precautions Fall Risk   Comments Orthostatic Hypotension, hx of falls with LOC and no warning,  abdominal binder OOB, R ankle brace, [place] (deaf R ear)   Current Discharge Plan   Current Discharge Plan Return to Prior Living Situation   Interdisciplinary Plan of Care Collaboration   Patient Position at End of Therapy In Bed;Call Light within Reach;Tray Table within Reach   Benefit   Therapy Benefit Patient Would Benefit from Inpatient Rehabilitation Physical Therapy to Maximize Functional [place] with ADLs, IADLs and Mobility.   Strengths & Barriers   Strengths Able to follow instructions;Motivated for self care and independence;Independent prior level of function;Good insight  into deficits/needs;Willingly participates in therapeutic activities;Pleasant and cooperative   Barriers Decreased endurance;Generalized weakness;Orthostatic hypotension;Impaired balance;Limited mobility   PT Total Time Spent   PT Individual Total Time Spent (Mins) 60   PT Charge Group   PT Therapeutic Activities 1   PT Evaluation PT Evaluation High       Assessment  Patient is 55 y.o. female with a diagnosis of Syncope due to orthostatic hypotension.  Additional factors influencing patient status / progress (ie: cognitive factors, co-morbidities, social support, etc): See H&P for PMH, comorbidities, and other factors. See flow sheet for list of impairments, functional deficits, and social support.      Plan  Recommend Physical Therapy  minutes per day 5-7 days per week for 2 weeks for the following treatments:  PT Gait Training, PT Therapeutic Exercises, PT Neuro Re-Ed/Balance, PT Therapeutic Activity, PT Manual Therapy and PT Evaluation.    Passport items to be completed:  Passport items to be completed:  Get in/out of bed safely, in/out of a vehicle, safely use mobility device, walk or wheel around home/community, navigate up and down stairs, show how to get up/down from the ground, ensure home is accessible, demonstrate HEP, complete caregiver training    Goals:  Long term and short term goals have been discussed with patient and they are in agreement.    Physical Therapy Problems (Active)     Problem: Mobility     Dates: Start: 06/29/21       Goal: STG-Within one week, patient will ambulate household distance 50 ft With LRD at [facility] in order to progress towards PLOF     Dates: Start: 06/29/21             Problem: Mobility Transfers     Dates: Start: 06/29/21       Goal: STG-Within one week, patient will transfer bed to chair At [facility] with LRD In order to maximize self mobility     Dates: Start: 06/29/21             Problem: PT-Long Term Goals     Dates: Start: 06/29/21       Goal: LTG-By discharge, patient  will ambulate 150 ft With LRD at [facility] in order to progress towards PLOF      Dates: Start: 06/29/21          Goal: LTG-By discharge, patient will transfer one surface to another At [facility] with LRD In order to maximize self mobility     Dates: Start: 06/29/21          Goal: LTG-By discharge, patient will ambulate up/down flight of stairs With single HR at [facility] in order to enter/exit home safely     Dates: Start: 06/29/21

## 2021-06-29 NOTE — FLOWSHEET NOTE
06/29/21 1112   Events/Summary/Plan   Events/Summary/Plan 02 spot check after return from [facility]   Vital Signs   Pulse (!) 55   Respiration 20   Pulse Oximetry 96 %   $ Pulse Oximetry (Spot Check) Yes   Respiratory Assessment   Level of Consciousness Alert   Chest Exam   Work Of Breathing / Effort Within Normal Limits   Oxygen   O2 Delivery Device Room air w/o2 available

## 2021-06-29 NOTE — DISCHARGE PLANNING
note:  Pt has been accepted back [facility].   Michael at [facility] will set up transport.

## 2021-06-29 NOTE — ASSESSMENT & PLAN NOTE
Has waxing/waning transaminitis  Hepatitis Panel negative  U/S RUQ hepatic steatosis or hepatocellular disease

## 2021-06-29 NOTE — THERAPY
06/29/21 1001   Therapy Missed   Missed Therapy (Minutes) 60   Reason For Missed Therapy Medical - Patient not Able To Participate;Non-Medical - Patient out of Facility  (pt transferred [facility] earlier this a.m.; unknown return time.)

## 2021-06-29 NOTE — ED NOTES
Report given to Benson RN. Pt transport at bedside. Pt awake, speaking without signs of distress.

## 2021-06-29 NOTE — ASSESSMENT & PLAN NOTE
BP ok but occ rises up  Has orthostatic hypotension  Off Cozaar --> 2nd to SE of orthostatics  On Coreg  Note: now on Lasix (7/1) -- dose increased recently  Permitting a little elevated BP since pt has sig orthostatics and syncope  Cont to monitor

## 2021-06-29 NOTE — CARE PLAN
Problem: Knowledge Deficit - Standard  Goal: Patient and family/care givers will demonstrate understanding of plan of care, disease process/condition, diagnostic tests and medications  Note: Call light with in reach. Redirection to use call light for assistance. Non skid socks. Upper siderails up x2 while in bed. On IV ABT to left arm picc line. Able to make needs known. Assisted as needed. Will monitor. Droplet isolation for covid r/o new admit.   The patient is Stable - Low risk of patient condition declining or worsening

## 2021-06-29 NOTE — DISCHARGE PLANNING
note:  Received a follow up call from Sal Garcia , admission coordinator at [facility]. He would like to be notified if pt will be discharged. Pt is from [facility] and they could possibly accept pt back.     Message sent to Agustin Das RN.
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
## 2021-06-29 NOTE — PROGRESS NOTES
-----------Non-Face-to-Face------------  Prolonged non-face-to-face time was spent on 6/27/21 from 1000 to 1037 by this reviewer.  This time included medical chart review, medication review, and decision making for the appropriateness of transfer to inpatient rehabilitation.  Please see PM&R Chart Review Consult from 6/27/21 by this provider for detailed summation of the medical chart and the reasoning for current recommendations. In addition to the above, time was spent coordinating care with case management as well as transitional care coordination team in the acceptance and transfer of the patient to the inpatient rehabilitation facility setting.

## 2021-06-29 NOTE — ED NOTES
Pt awake, lying in bed, speaking without signs of distress. States understanding of discharge instructions.

## 2021-06-29 NOTE — DISCHARGE PLANNING
As per Agustin ROBERTSON , pt is ready for discharge. Message sent to Sal, admission coordinator at [facility].

## 2021-06-29 NOTE — THERAPY
Physical Therapy   Daily Treatment     Patient Name: Donna Isaac  Age:  55 y.o., Sex:  female  Medical Record #: [id number]  Today's Date: 6/29/2021     Precautions  Precautions: Fall Risk, Other (See Comments)  Comments: Orthostatic Hypotension, hx of falls,  abdominal binder OOB, R ankle brace, [employer] [place] (deaf R ear), Admit isolation precautions    Subjective    Patient agreeable to PT and received supine with max elevated HOB.  Pt reports didn't have second dose of COVID-19 shot due to PE about 2.5 weeks after first vaccine shot.       Objective       06/29/21 1531   Vitals   O2 (LPM) 0   O2 Delivery Device None - Room Air   Gait Functional Level of Assist    Gait Level Of Assist Contact Guard Assist   Assistive Device Other (Comments)  (Gait belt & abdominal binder)   Distance (Feet) 45   # of Times Distance was Traveled 2   Deviation Trendelenberg;Increased Base Of Support;Bradykinetic;Decreased Heel Strike;Decreased Toe Off   Stairs Functional Level of Assist   Level of Assist with Stairs Unable to Participate   # of Stairs Climbed 0   Stairs Description   (Environment limitations 2° admit isolation precautions)   Transfer Functional Level of Assist   Bed, Chair, Wheelchair Transfer Contact Guard Assist   Bed Chair Wheelchair Transfer Description Adaptive equipment;Increased time;Set-up of equipment;Supervision for safety;Verbal cueing   Bed Mobility    Supine to Sit Supervised   Sit to Supine Supervised   Sit to Stand Contact Guard Assist   Scooting Stand by Assist   Rolling Supervised   Interdisciplinary Plan of Care Collaboration   IDT Collaboration with  Physician;Nursing;Physical Therapist;Occupational Therapist   Patient Position at End of Therapy In Bed;Bed Alarm On;Call Light within Reach;Tray Table within Reach;Phone within Reach   Collaboration Comments Hospitalist and RN for POC, Med Hx, wound picture, and meds admin. x20 min. Discussed POC, CLOF, and recent fall with IDT team today and PT/therapy  supervisor.   Therapy Missed   Missed Therapy (Minutes) 15   Reason For Missed Therapy Medical - Patient with Nursing;Medical - Other (Please Comment)  (Patient with Hospitalist and RN for 15 minutes of session)   PT Total Time Spent   PT Individual Total Time Spent (Mins) 30   PT Charge Group   Charges Yes   PT Gait Training 1   PT Therapeutic Activities 1     Reviewed/discussed call light usage, bed and chair alarms, rehab expectations, and pt's goals.    Assessment    Patient's Trendelenburg evident with ambulation; fair to good cognition and safety throughout with only occasional cues to improve safety during task; very motivated and pleasant; no LOB this session.       Plan    Ambulation, Ther ex for strengthening and endurance, Orientation to rehab facility, Standing tolerance and standing balance, Education    Passport items to be completed:  Passport items to be completed:  Get in/out of bed safely, in/out of a vehicle, safely use mobility device, walk or wheel around home/community, navigate up and down stairs, show how to get up/down from the ground, ensure home is accessible, demonstrate HEP, complete caregiver training    Physical Therapy Problems (Active)     Problem: Mobility     Dates: Start: 06/29/21       Goal: STG-Within one week, patient will ambulate household distance 50 ft With LRD at [facility] in order to progress towards PLOF     Dates: Start: 06/29/21             Problem: Mobility Transfers     Dates: Start: 06/29/21       Goal: STG-Within one week, patient will transfer bed to chair [facility] with LRD In order to maximize self mobility     Dates: Start: 06/29/21             Problem: PT-Long Term Goals     Dates: Start: 06/29/21       Goal: LTG-By discharge, patient will ambulate 150 ft With LRD at mod I in order to progress towards PLOF      Dates: Start: 06/29/21          Goal: LTG-By discharge, patient will transfer one surface to another At mod I with LRD In order to maximize self mobility      Dates: Start: 06/29/21          Goal: LTG-By discharge, patient will ambulate up/down flight of stairs With single HR at [facility] in order to enter/exit home safely     Dates: Start: 06/29/21

## 2021-06-30 LAB
ALBUMIN SERPL BCP-MCNC: 2 G/DL (ref 3.2–4.9)
ALBUMIN/GLOB SERPL: 1 G/DL
ALP SERPL-CCNC: 215 U/L (ref 30–99)
ALT SERPL-CCNC: 169 U/L (ref 2–50)
ANION GAP SERPL CALC-SCNC: 9 MMOL/L (ref 7–16)
AST SERPL-CCNC: 103 U/L (ref 12–45)
BILIRUB SERPL-MCNC: 1.8 MG/DL (ref 0.1–1.5)
BUN SERPL-MCNC: 16 MG/DL (ref 8–22)
CALCIUM SERPL-MCNC: 7.9 MG/DL (ref 8.5–10.5)
CHLORIDE SERPL-SCNC: 110 MMOL/L (ref 96–112)
CO2 SERPL-SCNC: 22 MMOL/L (ref 20–33)
CREAT SERPL-MCNC: 0.61 MG/DL (ref 0.5–1.4)
FERRITIN SERPL-MCNC: 94.1 NG/ML (ref 10–291)
GLOBULIN SER CALC-MCNC: 2.1 G/DL (ref 1.9–3.5)
GLUCOSE SERPL-MCNC: 100 MG/DL (ref 65–99)
HAV IGM SERPL QL IA: NORMAL
HBV CORE IGM SER QL: NORMAL
HBV SURFACE AG SER QL: NORMAL
HCV AB SER QL: NORMAL
IRON SATN MFR SERPL: 89 % (ref 15–55)
IRON SERPL-MCNC: 136 UG/DL (ref 40–170)
MAGNESIUM SERPL-MCNC: 1.7 MG/DL (ref 1.5–2.5)
NT-PROBNP SERPL IA-MCNC: 883 PG/ML (ref 0–125)
PHOSPHATE SERPL-MCNC: 3.6 MG/DL (ref 2.5–4.5)
POTASSIUM SERPL-SCNC: 3.7 MMOL/L (ref 3.6–5.5)
PROT SERPL-MCNC: 4.1 G/DL (ref 6–8.2)
SODIUM SERPL-SCNC: 141 MMOL/L (ref 135–145)
TIBC SERPL-MCNC: 153 UG/DL (ref 250–450)
UIBC SERPL-MCNC: <17 UG/DL (ref 110–370)

## 2021-06-30 PROCEDURE — 97530 THERAPEUTIC ACTIVITIES: CPT

## 2021-06-30 PROCEDURE — 700101 HCHG RX REV CODE 250: Performed by: PHYSICAL MEDICINE & REHABILITATION

## 2021-06-30 PROCEDURE — 80074 ACUTE HEPATITIS PANEL: CPT

## 2021-06-30 PROCEDURE — 97535 SELF CARE MNGMENT TRAINING: CPT

## 2021-06-30 PROCEDURE — 83735 ASSAY OF MAGNESIUM: CPT

## 2021-06-30 PROCEDURE — 99232 SBSQ HOSP IP/OBS MODERATE 35: CPT | Performed by: PHYSICAL MEDICINE & REHABILITATION

## 2021-06-30 PROCEDURE — 700102 HCHG RX REV CODE 250 W/ 637 OVERRIDE(OP): Performed by: HOSPITALIST

## 2021-06-30 PROCEDURE — 770010 HCHG ROOM/CARE - REHAB SEMI PRIVAT*

## 2021-06-30 PROCEDURE — 99232 SBSQ HOSP IP/OBS MODERATE 35: CPT | Performed by: HOSPITALIST

## 2021-06-30 PROCEDURE — 36415 COLL VENOUS BLD VENIPUNCTURE: CPT

## 2021-06-30 PROCEDURE — 83550 IRON BINDING TEST: CPT

## 2021-06-30 PROCEDURE — 82728 ASSAY OF FERRITIN: CPT

## 2021-06-30 PROCEDURE — A9270 NON-COVERED ITEM OR SERVICE: HCPCS | Performed by: HOSPITALIST

## 2021-06-30 PROCEDURE — 97116 GAIT TRAINING THERAPY: CPT | Mod: CQ

## 2021-06-30 PROCEDURE — 80053 COMPREHEN METABOLIC PANEL: CPT

## 2021-06-30 PROCEDURE — 700102 HCHG RX REV CODE 250 W/ 637 OVERRIDE(OP): Performed by: PHYSICAL MEDICINE & REHABILITATION

## 2021-06-30 PROCEDURE — 97110 THERAPEUTIC EXERCISES: CPT

## 2021-06-30 PROCEDURE — 83540 ASSAY OF IRON: CPT

## 2021-06-30 PROCEDURE — A9270 NON-COVERED ITEM OR SERVICE: HCPCS | Performed by: PHYSICAL MEDICINE & REHABILITATION

## 2021-06-30 PROCEDURE — 83880 ASSAY OF NATRIURETIC PEPTIDE: CPT

## 2021-06-30 PROCEDURE — 84100 ASSAY OF PHOSPHORUS: CPT

## 2021-06-30 RX ORDER — AMITRIPTYLINE HYDROCHLORIDE 10 MG/1
10 TABLET, FILM COATED ORAL DAILY
Status: DISCONTINUED | OUTPATIENT
Start: 2021-07-01 | End: 2021-07-12 | Stop reason: HOSPADM

## 2021-06-30 RX ORDER — ACETAMINOPHEN 325 MG/1
650 TABLET ORAL EVERY 4 HOURS PRN
Status: DISCONTINUED | OUTPATIENT
Start: 2021-06-30 | End: 2021-07-12 | Stop reason: HOSPADM

## 2021-06-30 RX ORDER — LIDOCAINE 50 MG/G
1 PATCH TOPICAL DAILY
Status: DISCONTINUED | OUTPATIENT
Start: 2021-06-30 | End: 2021-07-12 | Stop reason: HOSPADM

## 2021-06-30 RX ORDER — HYDROCORTISONE 10 MG/1
10 TABLET ORAL DAILY
Status: DISCONTINUED | OUTPATIENT
Start: 2021-06-30 | End: 2021-07-12 | Stop reason: HOSPADM

## 2021-06-30 RX ORDER — SUMATRIPTAN 25 MG/1
50 TABLET, FILM COATED ORAL
Status: DISCONTINUED | OUTPATIENT
Start: 2021-06-30 | End: 2021-07-12 | Stop reason: HOSPADM

## 2021-06-30 RX ORDER — BISACODYL 10 MG
10 SUPPOSITORY, RECTAL RECTAL
Status: DISCONTINUED | OUTPATIENT
Start: 2021-06-30 | End: 2021-07-12 | Stop reason: HOSPADM

## 2021-06-30 RX ORDER — ALUMINA, MAGNESIA, AND SIMETHICONE 2400; 2400; 240 MG/30ML; MG/30ML; MG/30ML
20 SUSPENSION ORAL
Status: DISCONTINUED | OUTPATIENT
Start: 2021-06-30 | End: 2021-07-12 | Stop reason: HOSPADM

## 2021-06-30 RX ORDER — VITAMIN B COMPLEX
2000 TABLET ORAL DAILY
Status: DISCONTINUED | OUTPATIENT
Start: 2021-06-24 | End: 2021-07-12 | Stop reason: HOSPADM

## 2021-06-30 RX ORDER — POLYVINYL ALCOHOL 14 MG/ML
1 SOLUTION/ DROPS OPHTHALMIC PRN
Status: DISCONTINUED | OUTPATIENT
Start: 2021-06-30 | End: 2021-07-12 | Stop reason: HOSPADM

## 2021-06-30 RX ORDER — HYDROCORTISONE 10 MG/1
20 TABLET ORAL DAILY
Status: DISCONTINUED | OUTPATIENT
Start: 2021-06-30 | End: 2021-07-12 | Stop reason: HOSPADM

## 2021-06-30 RX ORDER — ECHINACEA PURPUREA EXTRACT 125 MG
2 TABLET ORAL PRN
Status: DISCONTINUED | OUTPATIENT
Start: 2021-06-30 | End: 2021-07-12 | Stop reason: HOSPADM

## 2021-06-30 RX ORDER — FLUDROCORTISONE ACETATE 0.1 MG/1
0.1 TABLET ORAL EVERY MORNING
Status: DISCONTINUED | OUTPATIENT
Start: 2021-06-30 | End: 2021-07-07

## 2021-06-30 RX ORDER — POLYETHYLENE GLYCOL 3350 17 G/17G
1 POWDER, FOR SOLUTION ORAL
Status: DISCONTINUED | OUTPATIENT
Start: 2021-06-30 | End: 2021-07-12 | Stop reason: HOSPADM

## 2021-06-30 RX ORDER — ONDANSETRON 2 MG/ML
4 INJECTION INTRAMUSCULAR; INTRAVENOUS 4 TIMES DAILY PRN
Status: DISCONTINUED | OUTPATIENT
Start: 2021-06-30 | End: 2021-07-12 | Stop reason: HOSPADM

## 2021-06-30 RX ORDER — GABAPENTIN 400 MG/1
400 CAPSULE ORAL 2 TIMES DAILY
Status: DISCONTINUED | OUTPATIENT
Start: 2021-06-30 | End: 2021-07-12 | Stop reason: HOSPADM

## 2021-06-30 RX ORDER — HYDRALAZINE HYDROCHLORIDE 25 MG/1
25 TABLET, FILM COATED ORAL EVERY 8 HOURS PRN
Status: DISCONTINUED | OUTPATIENT
Start: 2021-06-30 | End: 2021-07-12 | Stop reason: HOSPADM

## 2021-06-30 RX ORDER — TRAZODONE HYDROCHLORIDE 50 MG/1
50 TABLET ORAL
Status: DISCONTINUED | OUTPATIENT
Start: 2021-06-30 | End: 2021-07-12 | Stop reason: HOSPADM

## 2021-06-30 RX ORDER — MONTELUKAST SODIUM 10 MG/1
10 TABLET ORAL EVERY EVENING
Status: DISCONTINUED | OUTPATIENT
Start: 2021-06-30 | End: 2021-07-05

## 2021-06-30 RX ORDER — ONDANSETRON 4 MG/1
4 TABLET, ORALLY DISINTEGRATING ORAL 4 TIMES DAILY PRN
Status: DISCONTINUED | OUTPATIENT
Start: 2021-06-30 | End: 2021-07-12 | Stop reason: HOSPADM

## 2021-06-30 RX ORDER — CARVEDILOL 3.12 MG/1
3.12 TABLET ORAL 2 TIMES DAILY WITH MEALS
Status: DISCONTINUED | OUTPATIENT
Start: 2021-06-30 | End: 2021-07-06

## 2021-06-30 RX ORDER — OMEPRAZOLE 20 MG/1
20 CAPSULE, DELAYED RELEASE ORAL DAILY
Status: DISCONTINUED | OUTPATIENT
Start: 2021-07-01 | End: 2021-07-09

## 2021-06-30 RX ORDER — CALCITONIN SALMON 200 [IU]/.09ML
1 SPRAY, METERED NASAL
Status: DISCONTINUED | OUTPATIENT
Start: 2021-06-30 | End: 2021-07-12 | Stop reason: HOSPADM

## 2021-06-30 RX ORDER — LIOTHYRONINE SODIUM 25 UG/1
25 TABLET ORAL
Status: DISCONTINUED | OUTPATIENT
Start: 2021-06-30 | End: 2021-07-12 | Stop reason: HOSPADM

## 2021-06-30 RX ORDER — LEVOTHYROXINE SODIUM 0.07 MG/1
75 TABLET ORAL
Status: DISCONTINUED | OUTPATIENT
Start: 2021-07-01 | End: 2021-07-12 | Stop reason: HOSPADM

## 2021-06-30 RX ORDER — COLESEVELAM 180 1/1
625 TABLET ORAL 3 TIMES DAILY
Status: DISCONTINUED | OUTPATIENT
Start: 2021-06-30 | End: 2021-07-12 | Stop reason: HOSPADM

## 2021-06-30 RX ORDER — AMOXICILLIN 250 MG
2 CAPSULE ORAL 2 TIMES DAILY
Status: DISCONTINUED | OUTPATIENT
Start: 2021-06-30 | End: 2021-07-02

## 2021-06-30 RX ORDER — DULOXETIN HYDROCHLORIDE 30 MG/1
60 CAPSULE, DELAYED RELEASE ORAL
Status: DISCONTINUED | OUTPATIENT
Start: 2021-06-30 | End: 2021-07-12 | Stop reason: HOSPADM

## 2021-06-30 RX ORDER — TIZANIDINE 4 MG/1
4 TABLET ORAL
Status: DISCONTINUED | OUTPATIENT
Start: 2021-06-30 | End: 2021-07-12 | Stop reason: HOSPADM

## 2021-06-30 RX ADMIN — COLESEVELAM HYDROCHLORIDE 625 MG: 625 TABLET, FILM COATED ORAL at 15:48

## 2021-06-30 RX ADMIN — OMEPRAZOLE 20 MG: 20 CAPSULE, DELAYED RELEASE ORAL at 08:19

## 2021-06-30 RX ADMIN — GABAPENTIN 400 MG: 400 CAPSULE ORAL at 21:14

## 2021-06-30 RX ADMIN — MONTELUKAST 10 MG: 10 TABLET, FILM COATED ORAL at 21:15

## 2021-06-30 RX ADMIN — COLESEVELAM HYDROCHLORIDE 625 MG: 625 TABLET, FILM COATED ORAL at 08:18

## 2021-06-30 RX ADMIN — CARVEDILOL 3.12 MG: 3.12 TABLET, FILM COATED ORAL at 08:17

## 2021-06-30 RX ADMIN — DULOXETINE 60 MG: 30 CAPSULE, DELAYED RELEASE ORAL at 21:14

## 2021-06-30 RX ADMIN — LIDOCAINE 1 PATCH: 50 PATCH TOPICAL at 15:51

## 2021-06-30 RX ADMIN — APIXABAN 5 MG: 5 TABLET, FILM COATED ORAL at 21:14

## 2021-06-30 RX ADMIN — LEVOTHYROXINE SODIUM 75 MCG: 75 TABLET ORAL at 06:01

## 2021-06-30 RX ADMIN — FLUDROCORTISONE ACETATE 0.1 MG: 0.1 TABLET ORAL at 06:01

## 2021-06-30 RX ADMIN — COLESEVELAM HYDROCHLORIDE 625 MG: 625 TABLET, FILM COATED ORAL at 21:15

## 2021-06-30 RX ADMIN — HYDROCORTISONE 20 MG: 10 TABLET ORAL at 08:18

## 2021-06-30 RX ADMIN — AMITRIPTYLINE HYDROCHLORIDE 10 MG: 10 TABLET, FILM COATED ORAL at 08:18

## 2021-06-30 RX ADMIN — CARVEDILOL 3.12 MG: 3.12 TABLET, FILM COATED ORAL at 17:57

## 2021-06-30 RX ADMIN — CHOLECALCIFEROL TAB 25 MCG (1000 UNIT) 2000 UNITS: 25 TAB at 08:19

## 2021-06-30 RX ADMIN — HYDROCORTISONE 10 MG: 10 TABLET ORAL at 15:48

## 2021-06-30 RX ADMIN — CALCITONIN SALMON 200 UNITS: 200 SPRAY, METERED NASAL at 21:17

## 2021-06-30 RX ADMIN — LIOTHYRONINE SODIUM 25 MCG: 25 TABLET ORAL at 21:14

## 2021-06-30 RX ADMIN — APIXABAN 5 MG: 5 TABLET, FILM COATED ORAL at 08:18

## 2021-06-30 RX ADMIN — ACETAMINOPHEN 650 MG: 325 TABLET, FILM COATED ORAL at 08:15

## 2021-06-30 RX ADMIN — ACETAMINOPHEN 650 MG: 325 TABLET, FILM COATED ORAL at 15:53

## 2021-06-30 RX ADMIN — GABAPENTIN 400 MG: 400 CAPSULE ORAL at 08:19

## 2021-06-30 ASSESSMENT — GAIT ASSESSMENTS
DEVIATION: TRENDELENBERG;BRADYKINETIC
GAIT LEVEL OF ASSIST: CONTACT GUARD ASSIST
ASSISTIVE DEVICE: FRONT WHEEL WALKER
GAIT LEVEL OF ASSIST: CONTACT GUARD ASSIST
DISTANCE (FEET): 80
DISTANCE (FEET): 70
DEVIATION: TRENDELENBERG;BRADYKINETIC

## 2021-06-30 ASSESSMENT — ACTIVITIES OF DAILY LIVING (ADL)
BED_CHAIR_WHEELCHAIR_TRANSFER_DESCRIPTION: INCREASED TIME;VERBAL CUEING
TOILET_TRANSFER_DESCRIPTION: INCREASED TIME;GRAB BAR;SET-UP OF EQUIPMENT;SUPERVISION FOR SAFETY;VERBAL CUEING
TOILETING_LEVEL_OF_ASSIST_DESCRIPTION: GRAB BAR;INCREASED TIME;INITIAL PREPARATION FOR TASK;SET-UP OF EQUIPMENT;SUPERVISION FOR SAFETY;VERBAL CUEING

## 2021-06-30 ASSESSMENT — 10 METER WALK TEST (10METWT)
TRIAL 3: TIME TO WALK 10 METERS: 14
AVERAGE TIME - SECONDS: 14
AVERAGE VELOCITY - METERS PER SECOND: .43
TRIAL 2: TIME TO WALK 10 METERS: 15
TRIAL 1: TIME TO WALK 10 METERS: 13

## 2021-06-30 ASSESSMENT — ENCOUNTER SYMPTOMS
VOMITING: 0
PALPITATIONS: 0
HALLUCINATIONS: 0
FEVER: 0
HEADACHES: 0
SHORTNESS OF BREATH: 0
BLURRED VISION: 0
DIZZINESS: 0
NAUSEA: 0

## 2021-06-30 ASSESSMENT — PAIN DESCRIPTION - PAIN TYPE: TYPE: ACUTE PAIN

## 2021-06-30 NOTE — CARE PLAN
Problem: Knowledge Deficit - Standard  Goal: Patient and family/care givers will demonstrate understanding of plan of care, disease process/condition, diagnostic tests and medications  Outcome: Progressing   Pt education given plan of care including fall precautions AND safety measures, pt shows good understanding, has not attempted to self transfer this shift, will continue to reinforce education and continue to monitor closely.

## 2021-06-30 NOTE — PROGRESS NOTES
"Rehab Progress Note     Encounter Date: 6/29/2021    CC: Orthostatic hypotension, syncope    Interval Events (Subjective)  Notified patient had syncope in shower. Patient was speaking with therapists when she leaned forward and had syncopal event when her head was bent down. She had LOC and head strike. She woke with left knee and wrist pain. Patient transferred to ED for head strike. CT Head and C spine was negative. Patient reportedly with improved pain in wrist and knee. OK'ed for transfer back. Patient arrived back to the unit. She reports she remembers talking with the therapist before her syncopal episode. She reports due to being in the shower she was not wearing any compression devices. Discussed about hospitalist consult, she is in agreement.     Objective:  VITAL SIGNS: /86   Pulse (!) 104   Temp 36.8 °C (98.3 °F) (Oral)   Resp 18   Ht 1.626 m (5' 4\")   Wt 100 kg (220 lb 7.4 oz)   LMP  (LMP Unknown)   SpO2 95%   BMI 37.84 kg/m²   Gen: NAD  Psych: Mood and affect appropriate  CV: RRR, no edema  Resp: CTAB, no upper airway sounds  Abd: NTND  Neuro: AOx4, following commands  No injury to wrist or knee, no scraping. Some facial scrapes    Recent Results (from the past 72 hour(s))   SARS-CoV-2 PCR (24 hour In-House): Collect NP swab in [place]    Collection Time: 06/28/21  2:00 PM    Specimen: Nasopharyngeal; Respirate   Result Value Ref Range    SARS-CoV-2 Source NP Swab     SARS-CoV-2 by PCR NotDetected    CBC with Differential    Collection Time: 06/29/21  6:06 AM   Result Value Ref Range    WBC 7.6 4.8 - 10.8 K/uL    RBC 4.35 4.20 - 5.40 M/uL    Hemoglobin 13.3 12.0 - 16.0 g/dL    Hematocrit 40.7 37.0 - 47.0 %    MCV 93.6 81.4 - 97.8 fL    MCH 30.6 27.0 - 33.0 pg    MCHC 32.7 (L) 33.6 - 35.0 g/dL    RDW 58.7 (H) 35.9 - 50.0 fL    Platelet Count 165 164 - 446 K/uL    MPV 11.9 9.0 - 12.9 fL    Neutrophils-Polys 62.70 44.00 - 72.00 %    Lymphocytes 24.00 22.00 - 41.00 %    Monocytes 10.30 0.00 - " 13.40 %    Eosinophils 1.90 0.00 - 6.90 %    Basophils 0.70 0.00 - 1.80 %    Immature Granulocytes 0.40 0.00 - 0.90 %    Nucleated RBC 0.30 /100 WBC    Neutrophils (Absolute) 4.74 2.00 - 7.15 K/uL    Lymphs (Absolute) 1.81 1.00 - 4.80 K/uL    Monos (Absolute) 0.78 0.00 - 0.85 K/uL    Eos (Absolute) 0.14 0.00 - 0.51 K/uL    Baso (Absolute) 0.05 0.00 - 0.12 K/uL    Immature Granulocytes (abs) 0.03 0.00 - 0.11 K/uL    NRBC (Absolute) 0.02 K/uL   Comp Metabolic Panel (CMP)    Collection Time: 06/29/21  6:06 AM   Result Value Ref Range    Sodium 139 135 - 145 mmol/L    Potassium 3.9 3.6 - 5.5 mmol/L    Chloride 108 96 - 112 mmol/L    Co2 22 20 - 33 mmol/L    Anion Gap 9.0 7.0 - 16.0    Glucose 94 65 - 99 mg/dL    Bun 15 8 - 22 mg/dL    Creatinine 0.49 (L) 0.50 - 1.40 mg/dL    Calcium 8.3 (L) 8.5 - 10.5 mg/dL    AST(SGOT) 135 (H) 12 - 45 U/L    ALT(SGPT) 216 (H) 2 - 50 U/L    Alkaline Phosphatase 224 (H) 30 - 99 U/L    Total Bilirubin 1.7 (H) 0.1 - 1.5 mg/dL    Albumin 2.2 (L) 3.2 - 4.9 g/dL    Total Protein 4.4 (L) 6.0 - 8.2 g/dL    Globulin 2.2 1.9 - 3.5 g/dL    A-G Ratio 1.0 g/dL   HEMOGLOBIN A1C    Collection Time: 06/29/21  6:06 AM   Result Value Ref Range    Glycohemoglobin 6.0 (H) 4.0 - 5.6 %    Est Avg Glucose 126 mg/dL   TSH with Reflex to FT4    Collection Time: 06/29/21  6:06 AM   Result Value Ref Range    TSH 0.020 (L) 0.380 - 5.330 uIU/mL   Vitamin D, 25-hydroxy (blood)    Collection Time: 06/29/21  6:06 AM   Result Value Ref Range    25-Hydroxy   Vitamin D 25 47 30 - 100 ng/mL   FREE THYROXINE    Collection Time: 06/29/21  6:06 AM   Result Value Ref Range    Free T-4 1.27 0.93 - 1.70 ng/dL   ESTIMATED GFR    Collection Time: 06/29/21  6:06 AM   Result Value Ref Range    GFR If African American >60 >60 mL/min/1.73 m 2    GFR If Non African American >60 >60 mL/min/1.73 m 2   POCT glucose device results    Collection Time: 06/29/21  7:51 AM   Result Value Ref Range    Glucose - Accu-Ck 90 65 - 99 mg/dL   EKG     Collection Time: 21  9:00 AM   Result Value Ref Range    Report       [facility]    Test Date:  2021  Pt Name:    CARMINA MORAN              Department: ER  MRN:        [id number]                      Room:        22  Gender:     Female                       Technician: [id number]  :        1965                   Requested By:CIPRIANO SHERMAN  Order #:    [id number]                    Reading MD:    Measurements  Intervals                                Axis  Rate:       109                          P:          53  NV:         204                          QRS:        4  QRSD:       72                           T:          176  QT:         288  QTc:        388    Interpretive Statements  SINUS TACHYCARDIA  BORDERLINE AV CONDUCTION DELAY  ABNORMAL T, CONSIDER ISCHEMIA, LATERAL LEADS  BASELINE WANDER IN LEAD(S) II,III,aVF  Compared to ECG 2021 15:51:16  T-wave abnormality now present  Possible ischemia now present  Sinus bradycardia no longer present  First degree AV block no longer present         Current Facility-Administered Medications   Medication Frequency   • [START ON 2021] hydrocortisone (CORTEF) tablet 20 mg DAILY   • hydrocortisone (CORTEF) tablet 10 mg DAILY   • [START ON 2021] fludrocortisone (FLORINEF) tablet 0.1 mg QAM   • Respiratory Therapy Consult Continuous RT   • Pharmacy Consult Request ...Pain Management Review 1 Each PHARMACY TO DOSE   • hydrALAZINE (APRESOLINE) tablet 25 mg Q8HRS PRN   • acetaminophen (Tylenol) tablet 650 mg Q4HRS PRN   • senna-docusate (PERICOLACE or SENOKOT S) 8.6-50 MG per tablet 2 tablet BID    And   • polyethylene glycol/lytes (MIRALAX) PACKET 1 Packet QDAY PRN    And   • magnesium hydroxide (MILK OF MAGNESIA) suspension 30 mL QDAY PRN    And   • bisacodyl (DULCOLAX) suppository 10 mg QDAY PRN   • artificial tears ophthalmic solution 1 Drop PRN   • benzocaine-menthol (CEPACOL) lozenge 1 Lozenge Q2HRS PRN   •  mag hydrox-al hydrox-simeth (MAALOX PLUS ES or MYLANTA DS) suspension 20 mL Q2HRS PRN   • ondansetron (ZOFRAN ODT) dispertab 4 mg 4X/DAY PRN    Or   • ondansetron (ZOFRAN) syringe/vial injection 4 mg 4X/DAY PRN   • traZODone (DESYREL) tablet 50 mg QHS PRN   • sodium chloride (OCEAN) 0.65 % nasal spray 2 Spray PRN   • amitriptyline (ELAVIL) tablet 10 mg DAILY   • apixaban (ELIQUIS) tablet 5 mg BID   • calcitonin (salmon) (MIACALCIN) nasal spray 200 Units QHS   • carvedilol (COREG) tablet 3.125 mg BID WITH MEALS   • colesevelam (WELCHOL) tablet 625 mg TID   • DULoxetine (CYMBALTA) capsule 60 mg QHS   • Eravacycline Dihydrochloride 100 mg BID   • gabapentin (NEURONTIN) capsule 400 mg BID   • levothyroxine (SYNTHROID) tablet 75 mcg AM ES   • liothyronine (CYTOMEL) tablet 25 mcg QHS   • montelukast (SINGULAIR) tablet 10 mg Q EVENING   • omeprazole (PRILOSEC) capsule 20 mg DAILY   • SUMAtriptan (IMITREX) tablet 50 mg Q2HRS PRN   • tizanidine (ZANAFLEX) tablet 4 mg QDAY PRN   • vitamin D (cholecalciferol) tablet 2,000 Units DAILY       Orders Placed This Encounter   Procedures   • Diet Order Diet: Regular     Standing Status:   Standing     Number of Occurrences:   1     Order Specific Question:   Diet:     Answer:   Regular [1]   • Diet NPO     Standing Status:   Standing     Number of Occurrences:   1     Order Specific Question:   Restrict to:     Answer:   Sips with Medications [3]       Assessment:  Active Hospital Problems    Diagnosis    • Syncope    • Transaminitis    • High potassium    • History of pulmonary embolism    • Adrenal insufficiency (Gibson's disease) (HCC)    • Hypertension    • Tachycardia    • Orthostatic hypotension    • Hypothyroidism    • Chronic systolic heart failure (HCC)    • Gastroesophageal reflux disease without esophagitis    • Depression        Medical Decision Making and Plan:  Orthostatic hypotension - Probably a combination of volume depletion and underlying Gibson's disease, was  started on Florinef at [facility] as well as SILVERIO hoses and abdominal binder  -PT and OT for mobility and ADLs  -Continue SILVERIO hoses and abdominal binder  -On Florinef 0.1 mg daily. Has CHF so needs to be on ARB and BB at lowest dose. Consider midodrine.   -Consult hospitalist. 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 Now on Florinef. Will monitor electrolytes.   -Consult Hospitalist, considering increase in Hydrocortisone level      CHF - Patient on Coreg 3.125 mg and Losartan 25 mg daily     Neuropathy - Patient on Gabapentin 400 mg BID     HLD - Patient on wlechol.      CKD - Avoid nephrotoxic agents. Check AM CMP     Anemia - Check AM CBC     Mood/Depression/Pain - Patient is on Cymbalta 60 mg and Amitriptyline 10 mg daily.      Elevated LFTs - Elevated on admission into 200s. Will monitor, was previously downtrending.     Asthma/COPD - Patient on Singulair on transfer.      Hypothyroidism - Patient on Levothyroxine 75 mcg and Cytomel 25 mcg QHS. Low TSH on admission, normal T4    MRSA sinusitis - Prolonged treatment, with multiple allergies to antibiotics. On Eravacycline 100 mg BID.      Morbid Obesity due to excess calories - BMI of 37.84 on admission, meets medical criteria. Dietitian to follow     GERD -Patient on omeprazole on transfer.      Hx of PE - Patient on Eliquis    Total time:  36 minutes.  I spent greater than 50% of the time for patient care and coordination on this date, including unit/floor time, and face-to-face time with the patient as per assessment and plan above.  Discussion included syncopal event, acute transfer, accepted back, and consult hospitalist.     Darion Metzger M.D.

## 2021-06-30 NOTE — PROGRESS NOTES
[facility] Medicine Daily Progress Note    Date of Service  6/30/2021    Chief Complaint:  Syncope  Orthostatic hypotension   AI  Transaminitis    Interval History:  No significant events or changes since last visit    Review of Systems  Review of Systems   Constitutional: Negative for fever.   Eyes: Negative for blurred vision.   Respiratory: Negative for shortness of breath.    Cardiovascular: Negative for palpitations.   Gastrointestinal: Negative for nausea and vomiting.   Neurological: Negative for dizziness and headaches.   Psychiatric/Behavioral: Negative for hallucinations.        Physical Exam  Temp:  [36.4 °C (97.5 °F)-36.8 °C (98.3 °F)] 36.4 °C (97.6 °F)  Pulse:  [] 77  Resp:  [18-20] 18  BP: (124-137)/(84-90) 133/84  SpO2:  [95 %-97 %] 97 %    Physical Exam  Vitals and nursing note reviewed.   Constitutional:       General: She is not in acute distress.  HENT:      Mouth/Throat:      Mouth: Mucous membranes are moist.      Pharynx: Oropharynx is clear.   Eyes:      General: No scleral icterus.  Neck:      Vascular: No JVD.   Cardiovascular:      Rate and Rhythm: Normal rate and regular rhythm.      Heart sounds: Normal heart sounds. No murmur heard.     Pulmonary:      Effort: Pulmonary effort is normal.      Breath sounds: Normal breath sounds. No stridor.   Abdominal:      General: There is no distension.      Palpations: Abdomen is soft.      Tenderness: There is no abdominal tenderness.   Musculoskeletal:      Cervical back: No rigidity.      Right lower leg: No edema.      Left lower leg: No edema.   Skin:     General: Skin is warm and dry.      Findings: No rash.   Neurological:      Mental Status: She is alert and oriented to person, place, and time.   Psychiatric:         Mood and Affect: Mood normal.         Behavior: Behavior normal.         Fluids    Intake/Output Summary (Last 24 hours) at 6/30/2021 1035  Last data filed at 6/30/2021 0835  Gross per 24 hour   Intake 240 ml   Output --    Net 240 ml       Laboratory  Recent Labs     06/29/21  0606   WBC 7.6   RBC 4.35   HEMOGLOBIN 13.3   HEMATOCRIT 40.7   MCV 93.6   MCH 30.6   MCHC 32.7*   RDW 58.7*   PLATELETCT 165   MPV 11.9     Recent Labs     06/29/21  0606 06/30/21  0526   SODIUM 139 141   POTASSIUM 3.9 3.7   CHLORIDE 108 110   CO2 22 22   GLUCOSE 94 100*   BUN 15 16   CREATININE 0.49* 0.61   CALCIUM 8.3* 7.9*                   Imaging    Assessment/Plan  Transaminitis  Assessment & Plan  Has an on & off hx -- rises up and then seems to self correct  LFT's elevated (6/29) --> now decreasing (6/30)  Hep profile wnl  Fe 136, sats 89%  F/U US liver  ? 2nd to meds  Note: has hx of hemachromatosis  Monitor    Syncope  Assessment & Plan  Has hx  Started about 5 years ago in [place] and then seemed to resolve  Pt states that over the last few months it has gotten worse  Has recent hx of mult hosp adms for syncope  Had syncope on am 6/29 while sitting in the shower and hit head -- CT head in ER ok  ? 2nd to AI and Orthostatic hypotension  Has abn TFT's -- see other assessment  Has recent PE and is on Eliquis  BNP: 176 (5/10) --> 20,599 (5/14) --> 2165 (5/25) --> 883 (6/30)  Echo (5/10): EF > 70%  TropI: has multiple studies recently -- unremarkable  TFT's -- see other assessment  Off Cozaar (was on for a few months) -- has SE of orthostatic hypotension  On Cortef: 10 mg bid --> changed to 20 mg qam and 10 mg qhs  On Florinef  Note: to f/u with her Neurologist for autonomic disorder  Monitor    History of pulmonary embolism- (present on admission)  Assessment & Plan  Suspected 2nd to the Covid vaccine  May also be 2nd to immobility at home (but no DVT)  On Eliquis    Adrenal insufficiency (Philippi's disease) (HCC)- (present on admission)  Assessment & Plan  Has seen Endocrine and suspects has AI  Has hx of occ low BP, orthostatic hypotension and syncope  Has abn TFT's -- see other assessment  Recently pt has been on several different doses of Cortef  On  Cortef: 10 mg bid --> changed to 20 mg qam and 10 mg qhs and adjusted times  On Florinef qam  D/W pt's Endocrinologist: she should also be on a growth hormone -- was on Zomacton before                                            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 Canales [phone]  D/W patient: will have  bring in home med Zomacton    Hypertension- (present on admission)  Assessment & Plan  BP ok  Has orthostatic hypotension  Off Cozaar --> 2nd to SE of orthostatics  On Coreg  Cont to monitor    Tachycardia- (present on admission)  Assessment & Plan  HR labile ()  HR occ rises up for no apparent reason and then self corrects   ? POTS (also has orthostatic hypotension)  On Coreg  Pt drinking enough water  Sodium levels ok  Monitor    Orthostatic hypotension- (present on admission)  Assessment & Plan  Has hx  Has abn TFT's -- see other assessment  Off Cozaar (has ortho SE)  On Cortef: 10 mg bid --> changed to 20 mg qam and 10 mg qhs  On Florinef qam  Monitor    Hypothyroidism- (present on admission)  Assessment & Plan  TSH low at 0.02  FT4 ok at 1.27  Past TFT's has been quite variable  On Synthroid  On Cytomel  D/W pt's Endocrinologist -- these variations are due to the pt's complex medical situation    Depression- (present on admission)  Assessment & Plan  On Cymbalta    Chronic systolic heart failure (HCC)- (present on admission)  Assessment & Plan  Compensated  Echo (5/10): EF > 70%  BNP: 176 (5/10) --> 20,599 (5/14) --> 2,165 (5/25) --> 883 (6/30)  On Coreg  Off Cozaar -- 2nd to possible orthostatic SE  Note: was previously on diuretics but held on the last admission  Followed by Cardio as out patient    Gastroesophageal reflux disease without esophagitis- (present on admission)  Assessment & Plan  On Prilosec

## 2021-06-30 NOTE — PROGRESS NOTES
"Rehab Progress Note     Encounter Date: 6/30/2021    CC: Orthostatic hypotension, syncope    Interval Events (Subjective)  Patient sitting up in room. She reports no syncope today. She reports her SBP has been good. Per hospitalist want to increase steroids to more physiologic and check US Abd. She reports therapy is going well. Denies NVD.     Objective:  VITAL SIGNS: /84   Pulse 77   Temp 36.4 °C (97.6 °F) (Temporal)   Resp 18   Ht 1.626 m (5' 4\")   Wt 100 kg (220 lb 7.4 oz)   LMP  (LMP Unknown)   SpO2 97%   BMI 37.84 kg/m²   Gen: NAD  Psych: Mood and affect appropriate  CV: RRR, no edema  Resp: CTAB, no upper airway sounds  Abd: NTND  Neuro: AOx4, following commands  No injury to wrist or knee, no scraping. Some facial scrapes  Unchanged from 6/29/21     Recent Results (from the past 72 hour(s))   SARS-CoV-2 PCR (24 hour In-House): Collect NP swab in [facility]    Collection Time: 06/28/21  2:00 PM    Specimen: Nasopharyngeal; Respirate   Result Value Ref Range    SARS-CoV-2 Source NP Swab     SARS-CoV-2 by PCR NotDetected    CBC with Differential    Collection Time: 06/29/21  6:06 AM   Result Value Ref Range    WBC 7.6 4.8 - 10.8 K/uL    RBC 4.35 4.20 - 5.40 M/uL    Hemoglobin 13.3 12.0 - 16.0 g/dL    Hematocrit 40.7 37.0 - 47.0 %    MCV 93.6 81.4 - 97.8 fL    MCH 30.6 27.0 - 33.0 pg    MCHC 32.7 (L) 33.6 - 35.0 g/dL    RDW 58.7 (H) 35.9 - 50.0 fL    Platelet Count 165 164 - 446 K/uL    MPV 11.9 9.0 - 12.9 fL    Neutrophils-Polys 62.70 44.00 - 72.00 %    Lymphocytes 24.00 22.00 - 41.00 %    Monocytes 10.30 0.00 - 13.40 %    Eosinophils 1.90 0.00 - 6.90 %    Basophils 0.70 0.00 - 1.80 %    Immature Granulocytes 0.40 0.00 - 0.90 %    Nucleated RBC 0.30 /100 WBC    Neutrophils (Absolute) 4.74 2.00 - 7.15 K/uL    Lymphs (Absolute) 1.81 1.00 - 4.80 K/uL    Monos (Absolute) 0.78 0.00 - 0.85 K/uL    Eos (Absolute) 0.14 0.00 - 0.51 K/uL    Baso (Absolute) 0.05 0.00 - 0.12 K/uL    Immature Granulocytes (abs) 0.03 " 0.00 - 0.11 K/uL    NRBC (Absolute) 0.02 K/uL   Comp Metabolic Panel (CMP)    Collection Time: 21  6:06 AM   Result Value Ref Range    Sodium 139 135 - 145 mmol/L    Potassium 3.9 3.6 - 5.5 mmol/L    Chloride 108 96 - 112 mmol/L    Co2 22 20 - 33 mmol/L    Anion Gap 9.0 7.0 - 16.0    Glucose 94 65 - 99 mg/dL    Bun 15 8 - 22 mg/dL    Creatinine 0.49 (L) 0.50 - 1.40 mg/dL    Calcium 8.3 (L) 8.5 - 10.5 mg/dL    AST(SGOT) 135 (H) 12 - 45 U/L    ALT(SGPT) 216 (H) 2 - 50 U/L    Alkaline Phosphatase 224 (H) 30 - 99 U/L    Total Bilirubin 1.7 (H) 0.1 - 1.5 mg/dL    Albumin 2.2 (L) 3.2 - 4.9 g/dL    Total Protein 4.4 (L) 6.0 - 8.2 g/dL    Globulin 2.2 1.9 - 3.5 g/dL    A-G Ratio 1.0 g/dL   HEMOGLOBIN A1C    Collection Time: 21  6:06 AM   Result Value Ref Range    Glycohemoglobin 6.0 (H) 4.0 - 5.6 %    Est Avg Glucose 126 mg/dL   TSH with Reflex to FT4    Collection Time: 21  6:06 AM   Result Value Ref Range    TSH 0.020 (L) 0.380 - 5.330 uIU/mL   Vitamin D, 25-hydroxy (blood)    Collection Time: 21  6:06 AM   Result Value Ref Range    25-Hydroxy   Vitamin D 25 47 30 - 100 ng/mL   FREE THYROXINE    Collection Time: 21  6:06 AM   Result Value Ref Range    Free T-4 1.27 0.93 - 1.70 ng/dL   ESTIMATED GFR    Collection Time: 21  6:06 AM   Result Value Ref Range    GFR If African American >60 >60 mL/min/1.73 m 2    GFR If Non African American >60 >60 mL/min/1.73 m 2   POCT glucose device results    Collection Time: 21  7:51 AM   Result Value Ref Range    Glucose - Accu-Ck 90 65 - 99 mg/dL   EKG    Collection Time: 21  9:00 AM   Result Value Ref Range    Report       [facility]    Test Date:  2021  Pt Name:    CARMINA MORAN              Department: ER  MRN:        [id number]                      Room:       [facility]  Gender:     Female                       Technician: [id number]  :        [id number]                   Requested By:CIPRIANO FOWLER  WHIT  Order #:    [id number]                    Reading MD:    Measurements  Intervals                                Axis  Rate:       109                          P:          53  LA:         204                          QRS:        4  QRSD:       72                           T:          176  QT:         288  QTc:        388    Interpretive Statements  SINUS TACHYCARDIA  BORDERLINE AV CONDUCTION DELAY  ABNORMAL T, CONSIDER ISCHEMIA, LATERAL LEADS  BASELINE WANDER IN LEAD(S) II,III,aVF  Compared to ECG 06/23/2021 15:51:16  T-wave abnormality now present  Possible ischemia now present  Sinus bradycardia no longer present  First degree AV block no longer present     Comp Metabolic Panel    Collection Time: 06/30/21  5:26 AM   Result Value Ref Range    Sodium 141 135 - 145 mmol/L    Potassium 3.7 3.6 - 5.5 mmol/L    Chloride 110 96 - 112 mmol/L    Co2 22 20 - 33 mmol/L    Anion Gap 9.0 7.0 - 16.0    Glucose 100 (H) 65 - 99 mg/dL    Bun 16 8 - 22 mg/dL    Creatinine 0.61 0.50 - 1.40 mg/dL    Calcium 7.9 (L) 8.5 - 10.5 mg/dL    AST(SGOT) 103 (H) 12 - 45 U/L    ALT(SGPT) 169 (H) 2 - 50 U/L    Alkaline Phosphatase 215 (H) 30 - 99 U/L    Total Bilirubin 1.8 (H) 0.1 - 1.5 mg/dL    Albumin 2.0 (L) 3.2 - 4.9 g/dL    Total Protein 4.1 (L) 6.0 - 8.2 g/dL    Globulin 2.1 1.9 - 3.5 g/dL    A-G Ratio 1.0 g/dL   MAGNESIUM    Collection Time: 06/30/21  5:26 AM   Result Value Ref Range    Magnesium 1.7 1.5 - 2.5 mg/dL   PHOSPHORUS    Collection Time: 06/30/21  5:26 AM   Result Value Ref Range    Phosphorus 3.6 2.5 - 4.5 mg/dL   IRON/TOTAL IRON BIND    Collection Time: 06/30/21  5:26 AM   Result Value Ref Range    Iron 136 40 - 170 ug/dL    Total Iron Binding 153 (L) 250 - 450 ug/dL    Unsat Iron Binding <17 (L) 110 - 370 ug/dL    % Saturation 89 (H) 15 - 55 %   FERRITIN    Collection Time: 06/30/21  5:26 AM   Result Value Ref Range    Ferritin 94.1 10.0 - 291.0 ng/mL   HEPATITIS PANEL ACUTE(4 COMPONENTS)    Collection Time: 06/30/21   5:26 AM   Result Value Ref Range    Hepatitis B Surface Antigen Non-Reactive Non-Reactive    Hepatitis B Cors Ab,IgM Non-Reactive Non-Reactive    Hepatitis A Virus Ab, IgM Non-Reactive Non-Reactive    Hepatitis C Antibody Non-Reactive Non-Reactive   proBrain Natriuretic Peptide, NT    Collection Time: 06/30/21  5:26 AM   Result Value Ref Range    NT-proBNP 883 (H) 0 - 125 pg/mL   ESTIMATED GFR    Collection Time: 06/30/21  5:26 AM   Result Value Ref Range    GFR If African American >60 >60 mL/min/1.73 m 2    GFR If Non African American >60 >60 mL/min/1.73 m 2       No current facility-administered medications for this encounter.     Current Outpatient Medications   Medication   • furosemide (LASIX) 20 MG Tab   • carvedilol (COREG) 3.125 MG Tab   • losartan (COZAAR) 25 MG Tab   • fludrocortisone (FLORINEF) 0.1 MG Tab   • nystatin (MYCOSTATIN) powder   • XERAVA 100 MG Recon Soln   • cetirizine (KLS ALLER-HAWA) 10 MG Tab   • levothyroxine (SYNTHROID) 75 MCG Tab   • BIOTIN PO   • CALCIUM-MAGNESIUM-ZINC PO   • Cholecalciferol (D3) 2000 UNIT Tab   • multivitamin (THERAGRAN) Tab   • Probiotic Product (PROBIOTIC DAILY PO)   • VITAMIN K PO   • hydrocortisone (CORTEF) 10 MG Tab   • ELIQUIS 5 MG Tab   • famotidine (PEPCID) 40 MG Tab   • levalbuterol (XOPENEX HFA) 45 MCG/ACT inhaler   • amitriptyline (ELAVIL) 10 MG Tab   • Erenumab (AIMOVIG) 70 MG/ML Solution Auto-injector   • cyanocobalamin (VITAMIN B-12) 1000 MCG/ML Solution   • Dexlansoprazole (DEXILANT) 60 MG CAPSULE DELAYED RELEASE delayed-release capsule   • DULoxetine (CYMBALTA) 60 MG Cap DR Particles delayed-release capsule   • estradiol (ESTRACE) 0.5 MG tablet   • liothyronine (CYTOMEL) 25 MCG Tab   • Somatropin (ZOMACTON) 10 MG Recon Soln   • Magnesium 400 MG Tab   • Frovatriptan Succinate (FROVA) 2.5 MG Tab   • montelukast (SINGULAIR) 10 MG Tab   • calcitonin, salmon, (MIACALCIN) 200 UNIT/ACT Solution   • colesevelam (WELCHOL) 625 MG Tab   • gabapentin (NEURONTIN) 400  MG Cap       Orders Placed This Encounter   Procedures   • Diet Order Diet: Regular     Standing Status:   Standing     Number of Occurrences:   1     Order Specific Question:   Diet:     Answer:   Regular [1]       Assessment:  Active Hospital Problems    Diagnosis    • Syncope    • Transaminitis    • High potassium    • History of pulmonary embolism    • Adrenal insufficiency (Valentín's disease) ([facility])    • Hypertension    • Tachycardia    • Orthostatic hypotension    • Hypothyroidism    • Chronic systolic heart failure (HCC)    • Gastroesophageal reflux disease without esophagitis    • Depression        Medical Decision Making and Plan:  Orthostatic hypotension - Probably a combination of volume depletion and underlying [place]'s disease, was started on Florinef at [facility] as well as SILVERIO hoses and abdominal binder  -PT and OT for mobility and ADLs  -Continue SILVERIO hoses and abdominal binder  -On Florinef 0.1 mg daily. Has CHF so needs to be on ARB and BB at lowest dose. Consider midodrine.   -Consult hospitalist. Syncope on day of evaluation while in shower 6/29     [place]'s disease - Patient on Hydrocortisone 10 mg BID. Now on Florinef. Will monitor electrolytes.   -Consult Hospitalist, considering increase in Hydrocortisone level. Currently 20 mg qAM and 10 mg QHS       CHF - Patient on Coreg 3.125 mg and Losartan 25 mg daily     Neuropathy - Patient on Gabapentin 400 mg BID     HLD - Patient on welchol.      CKD - Avoid nephrotoxic agents. Check AM CMP     Anemia - Check AM CBC - resolved     Mood/Depression/Pain - Patient is on Cymbalta 60 mg and Amitriptyline 10 mg daily.      Elevated LFTs - Elevated on admission into 200s. Will monitor, was previously downtrending.   -Into 100s on repeat, will monitor    Asthma/COPD - Patient on Singulair on transfer.      Hypothyroidism - Patient on Levothyroxine 75 mcg and Cytomel 25 mcg QHS. Low TSH on admission, normal T4    MRSA sinusitis - Prolonged treatment, with  multiple allergies to antibiotics. On Eravacycline 100 mg BID.      Morbid Obesity due to excess calories - BMI of 37.84 on admission, meets medical criteria. Dietitian to follow     GERD -Patient on omeprazole on transfer.      Hx of PE - Patient on Eliquis    Total time:  26 minutes.  I spent greater than 50% of the time for patient care, counseling, and coordination on this date, including unit/floor time, and face-to-face time with the patient as per interval events and assessment and plan above. Topics discussed included improved SBP on higher dose steroids, US abd pending, encouraged fludis and discussed about thyroid levels. Will need routine follow-up with endocrinology.     Darion Metzger M.D.

## 2021-06-30 NOTE — THERAPY
Physical Therapy   Daily Treatment     Patient Name: Donna Isaac  Age:  55 y.o., Sex:  female  Medical Record #: [id number]  Today's Date: 6/30/2021     Precautions  Precautions: Fall Risk, Other (See Comments)  Comments: Orthostatic Hypotension, hx of falls,  abdominal binder OOB, R ankle brace, [place] (deaf R ear)    Subjective    Pt reports she is agreeable to PT session but is sore from fall yesterday     Objective       06/30/21 0931   Gait Functional Level of Assist    Gait Level Of Assist Contact Guard Assist  (wc follow)   Assistive Device None   Distance (Feet) 80   # of Times Distance was Traveled 4   Deviation Trendelenberg;Bradykinetic   Transfer Functional Level of Assist   Bed, Chair, Wheelchair Transfer Contact Guard Assist   Bed Chair Wheelchair Transfer Description Increased time;Verbal cueing   Standing Lower Body Exercises   Hamstring Curl 2 sets of 15;Bilateral    Hip Extension 2 sets of 15;Bilateral    Hip Abduction 2 sets of 15;Bilateral   Marching 2 sets of 15   Heel Rise   (attempted, hurt ankle and DCd)   Toe Rise   (attempted, hurt ankle and DCd)   Mini Squat   (attempted, hurt ankle and DCd)   Comments CGA with gait belt throughtout in case of passing out, frequent seated rest breaks dt occasional dizziness   Bed Mobility    Supine to Sit Supervised   Sit to Supine Supervised   Sit to Stand Contact Guard Assist   Scooting Supervised   Rolling Supervised   Outcome Measures   Outcome Measures Utilized 10 Meter Walk Test   10 Meter Walk Test   Normal - Trial 1 13 seconds   Normal - Trial 2 15 seconds   Normal - Trial 3 14 seconds   Normal Average Time 14 seconds   Normal Average Velocity (m/s) 0.43   Interdisciplinary Plan of Care Collaboration   Patient Position at End of Therapy In Bed;Call Light within Reach;Tray Table within Reach   PT Total Time Spent   PT Individual Total Time Spent (Mins) 60   PT Charge Group   PT Gait Training 2   PT Therapeutic Exercise 1   PT Therapeutic  Activities 1       Assessment    Pt somewhat limited in exercise ability today dt soreness from fall yesterday but does demo good motivation to participate. Pt performs increased gait distance with no AD and close wc follow, slow gait speed via 10MWT.    Strengths: Able to follow instructions, Motivated for self care and independence, Independent prior level of function, Good insight into deficits/needs, Willingly participates in therapeutic activities, Pleasant and cooperative  Barriers: Decreased endurance, Generalized weakness, Orthostatic hypotension, Impaired balance, Limited mobility    Plan      Ambulation, Ther ex for strengthening and endurance, Orientation to rehab facility, Standing tolerance and standing balance, Education     Passport items to be completed:  Passport items to be completed:  Get in/out of bed safely, in/out of a vehicle, safely use mobility device, walk or wheel around home/community, navigate up and down stairs, show how to get up/down from the ground, ensure home is accessible, demonstrate HEP, complete caregiver training    Physical Therapy Problems (Active)     Problem: Mobility     Dates: Start: 06/29/21       Goal: STG-Within one week, patient will ambulate household distance 50 ft With LRD at [facility] in order to progress towards PLOF     Dates: Start: 06/29/21             Problem: Mobility Transfers     Dates: Start: 06/29/21       Goal: STG-Within one week, patient will transfer bed to chair [facility] with LRD In order to maximize self mobility     Dates: Start: 06/29/21             Problem: PT-Long Term Goals     Dates: Start: 06/29/21       Goal: LTG-By discharge, patient will ambulate 150 ft With LRD at mod I in order to progress towards PLOF      Dates: Start: 06/29/21          Goal: LTG-By discharge, patient will transfer one surface to another At mod I with LRD In order to maximize self mobility     Dates: Start: 06/29/21          Goal: LTG-By discharge, patient will ambulate  up/down flight of stairs With single HR at [facility] in order to enter/exit home safely     Dates: Start: 06/29/21

## 2021-06-30 NOTE — PROGRESS NOTES
Rehab Fall Note    Date of fall: 6/29/2021     Time of incident/discovery? 0742     Witnessed or Unwitnessed: unwitnessed     Assisted or unassisted?: unassisted   Staff member present? Therapist      Head strike?: yes    What is the patient's orientation status? oriented x 4    Location of fall: bathroom shower   Was this a fall with therapy? Yes yes     Patient had a fall from: chair/wheelchair sitting on shower chair     Injury from fall: none none     Persons contacted regarding the fall: family/caregiver pt wants to call spouse     Post fall huddle called: no no     Persons present at post fall huddle   Interventions to prevent another fall: alarms on, call light within reach, shoes on, room close to nursing station and signs on patient's door N/A     Was the call light used? No     Did the bed or chair alarm sound? No       If no alarm sounded, do the alarms work? N/A      If alarm malfunctioned, was a maintenance request submitted? N/A     Was the pt. wearing a seatbelt prior to the fall? N/A  If wearing, did the pt. take off the seatbelt? N/A  NA   What is the patient's transfer status?  moderate     Other fall details:

## 2021-06-30 NOTE — CARE PLAN
The patient is Watcher - Medium risk of patient condition declining or worsening    Problem: Pain - Standard  Goal: Alleviation of pain or a reduction in pain to the patient’s comfort goal  Outcome: Progressing  Flowsheets (Taken 6/29/2021 2000)  Pain Rating Scale (NPRS): 3  Note: Patient states relieved with tylenol and ice packs     Problem: Fall Risk - Rehab  Goal: Patient will remain free from falls  Outcome: Progressing  Note: Call light within reach, patient educated and encouraged to use for assistance for any needs and for assistance for safe transferring. Patient not left alone in when up to bathroom.

## 2021-06-30 NOTE — THERAPY
"Occupational Therapy  Daily Treatment     Patient Name: Donna Isaac  Age:  55 y.o., Sex:  female  Medical Record #: [id number]  Today's Date: 6/30/2021     Precautions  Precautions: Fall Risk, Other (See Comments)  Comments: Orthostatic Hypotension, hx of falls,  abdominal binder OOB, R ankle brace, [place] (deaf R ear)    Safety   ADL Safety : Requires Supervision for Safety, Requires Physical Assist for Safety, Impaired Insight into Safety, Impaired  Bathroom Safety: Requires Supervision for Safety, Requires Physical Assist for Safety, Impaired Insight into Safety, Impaired  Comments: see below functional levels for ADL performance details.     Subjective       Objective       06/30/21 1301   Precautions   Precautions Fall Risk;Other (See Comments)   Comments Orthostatic Hypotension, hx of falls,  abdominal binder OOB, R ankle brace, [place] (deaf R ear)   Safety    ADL Safety  Requires Supervision for Safety;Requires Physical Assist for Safety;Impaired Insight into Safety;Impaired   Bathroom Safety Requires Supervision for Safety;Requires Physical Assist for Safety;Impaired Insight into Safety;Impaired   Vitals   O2 Delivery Device None - Room Air   Non Verbal Descriptors   Non Verbal Scale  Calm   Cognition    Level of Consciousness Alert   Sleep/Wake Cycle   Sleep & Rest Awake   Functional Level of Assist   Toileting Minimal Assist   Toileting Description Grab bar;Increased time;Initial preparation for task;Set-up of equipment;Supervision for safety;Verbal cueing   Toilet Transfers Contact Guard Assist   Toilet Transfer Description Increased time;Grab bar;Set-up of equipment;Supervision for safety;Verbal cueing   Sitting Upper Body Exercises   Chest Press 3 sets of 10  (\"Equalizer\" 3x10@10#'s)   Tricep Press 3 sets of 15  (Carolyn:[id number]@20#'s;[id number]@15#'s)   Upper Extremity Bike Level 1 Resistance  (FluidoBike, 5 mins x 4, 2.102 km, rest break btwn sets(4x))   Other Exercise Seated Row @ \"Equalizer\" " 3x10@10#'s   Bed Mobility    Supine to Sit Supervised   Scooting Supervised   Interdisciplinary Plan of Care Collaboration   IDT Collaboration with  Physician;Nursing;Recreation Therapist   Patient Position at End of Therapy Seated;Self Releasing Lap Belt Applied;Chair Alarm On   Collaboration Comments CLOF   OT Total Time Spent   OT Individual Total Time Spent (Mins) 90   OT Charge Group   OT Self Care / ADL 1   OT Therapeutic Exercise  5       Assessment    Pt was alert and cooperative w/ tx.  Tx emphasis on ADL's at wc level and seated UB TherEx w/ observation of Bilateral rotator cuff condition.  Strengths: Pleasant and cooperative, Willingly participates in therapeutic activities  Barriers: Decreased endurance, Fatigue, Generalized weakness, Hearing impairment, Orthostatic hypotension, Impaired activity tolerance, Impaired balance, Limited mobility    Plan    ADLs, IADLs, functional mobility, AE education, strength/endurance building, balance.     Passport items to be completed:  Passport items to be completed:  Perform bathroom transfers, complete dressing, complete feeding, get ready for the day, prepare a simple meal, participate in household tasks, adapt home for safety needs, demonstrate home exercise program, complete caregiver training     Occupational Therapy Goals (Active)     Problem: Dressing     Dates: Start: 06/29/21       Goal: STG-Within one week, patient will dress LB     Dates: Start: 06/29/21       Goal Note filed on 06/29/21 0823 by Dennis Alcantar MS,OTR/L     1) Individualized Goal:  Mod assist Lower Body Clothing Management via AE/DME PRN  2) Interventions:  OT Self Care/ADL, OT Neuro Re-Ed/Balance, OT Therapeutic Activity, OT Evaluation, and OT Therapeutic Exercise               Problem: Functional Transfers     Dates: Start: 06/29/21       Goal: STG-Within one week, patient will transfer to toilet     Dates: Start: 06/29/21       Goal Note filed on 06/29/21 0823 by Dennis Alcantar MS,OTR/L      1) Individualized Goal:  Sup/SBA for commode transfer via DME PRN  2) Interventions:  OT Self Care/ADL, OT Neuro Re-Ed/Balance, OT Therapeutic Activity, OT Evaluation, and OT Therapeutic Exercise               Problem: OT Long Term Goals     Dates: Start: 06/29/21       Goal: LTG-By discharge, patient will complete basic self care tasks     Dates: Start: 06/29/21       Goal Note filed on 06/29/21 0823 by Dennis Alcantar MS,OTR/L     1) Individualized Goal:  Mod I for BADL's via AE/DME PRN  2) Interventions:  OT Self Care/ADL, OT Neuro Re-Ed/Balance, OT Therapeutic Activity, OT Evaluation, and OT Therapeutic Exercise            Goal: LTG-By discharge, patient will perform bathroom transfers     Dates: Start: 06/29/21       Goal Note filed on 06/29/21 0823 by Dennis Alcantar MS,OTR/L     1) Individualized Goal:  Mod I for bathroom transfers via DME PRN  2) Interventions: OT Self Care/ADL, OT Neuro Re-Ed/Balance, OT Therapeutic Activity, OT Evaluation, and OT Therapeutic Exercise               Problem: Toileting     Dates: Start: 06/29/21       Goal: STG-Within one week, patient will complete toileting tasks     Dates: Start: 06/29/21       Goal Note filed on 06/29/21 0823 by Dennis Alcantar MS,OTR/L     1) Individualized Goal:  Sup/SBA for toileting task via DME PRN  2) Interventions:  OT Self Care/ADL, OT Neuro Re-Ed/Balance, OT Therapeutic Activity, OT Evaluation, and OT Therapeutic Exercise

## 2021-06-30 NOTE — THERAPY
Physical Therapy   Daily Treatment     Patient Name: Donna Isaac  Age:  55 y.o., Sex:  female  Medical Record #: [id number]  Today's Date: 6/30/2021     Precautions  Precautions: Fall Risk, Other (See Comments)  Comments: Orthostatic Hypotension, hx of falls,  abdominal binder OOB, R ankle brace, [place] (deaf R ear)    Subjective     pt reports she only recently began to use a walker in the last few months     Objective       06/30/21 0831   Pain   Intervention Declines   Gait Functional Level of Assist    Gait Level Of Assist Contact Guard Assist   Assistive Device Front Wheel Walker   Distance (Feet) 70   # of Times Distance was Traveled 2   Deviation Trendelenberg;Bradykinetic   Bed Mobility    Supine to Sit Supervised   Sit to Supine Supervised   Sit to Stand Contact Guard Assist   Scooting Supervised   Rolling Supervised   Interdisciplinary Plan of Care Collaboration   IDT Collaboration with  Therapy Tech;Nursing   Patient Position at End of Therapy In Bed;Call Light within Reach   Collaboration Comments tech MARTHA PRN, RN vitals   PT Total Time Spent   PT Individual Total Time Spent (Mins) 30   PT Charge Group   PT Gait Training 2   Supervising Physical Therapist Christopher Weeks     Set up to anabela R ankle brace    Assessment    Pt able to amb short  distance in the room with FWW for balance and safety. Pt with good activity tolerance and motivated to regain her strength and mobility.    Strengths: Able to follow instructions, Motivated for self care and independence, Independent prior level of function, Good insight into deficits/needs, Willingly participates in therapeutic activities, Pleasant and cooperative  Barriers: Decreased endurance, Generalized weakness, Orthostatic hypotension, Impaired balance, Limited mobility    Plan    Ambulation, Ther ex for strengthening and endurance, Orientation to rehab facility, Standing tolerance and standing balance, Education     Passport items to be completed:  Passport  items to be completed:  Get in/out of bed safely, in/out of a vehicle, safely use mobility device, walk or wheel around home/community, navigate up and down stairs, show how to get up/down from the ground, ensure home is accessible, demonstrate HEP, complete caregiver training    Physical Therapy Problems (Active)     Problem: Mobility     Dates: Start: 06/29/21       Goal: STG-Within one week, patient will ambulate household distance 50 ft With LRD at [facility] in order to progress towards PLOF     Dates: Start: 06/29/21             Problem: Mobility Transfers     Dates: Start: 06/29/21       Goal: STG-Within one week, patient will transfer bed to chair At [facility] with LRD In order to maximize self mobility     Dates: Start: 06/29/21             Problem: PT-Long Term Goals     Dates: Start: 06/29/21       Goal: LTG-By discharge, patient will ambulate 150 ft With LRD at mod I in order to progress towards PLOF      Dates: Start: 06/29/21          Goal: LTG-By discharge, patient will transfer one surface to another At mod I with LRD In order to maximize self mobility     Dates: Start: 06/29/21          Goal: LTG-By discharge, patient will ambulate up/down flight of stairs With single HR at [facility] in order to enter/exit home safely     Dates: Start: 06/29/21

## 2021-06-30 NOTE — DISCHARGE PLANNING
CASE MANAGEMENT INITIAL ASSESSMENT    Admit Date:  6/28/2021     I met with patient and her  Colin to discuss role of case management / discharge planning / team conference.   Patient is a  55 y.o. female transferred from [facility] after IP stay from 6/22/21 to 6/28/2021 for transient LOC and syncope due to orthostatic hypotension.    PMHx:  CHF, HTN, Hypothyroidism, Adrenal insufficiency, depression, and Hx of DVT/PE    PLOF:  Patient lives with her  in [place] in a 2SH with 2STE. She has been relatively inactive recently due to fracture of her foot and then multiple syncopal episodes.    Admitting:  GARRY Metzger MD    Diagnosis: Orthostatic hypotension [I95.1]    Co-morbidities:   Patient Active Problem List    Diagnosis Date Noted   • Syncope 06/29/2021   • Transaminitis 06/29/2021   • High potassium 06/29/2021   • MRSA (methicillin resistant Staphylococcus aureus) sinus infection 06/22/2021   • History of pulmonary embolism 06/22/2021   • Abnormal nuclear stress test 05/30/2021   • Elevated troponin 05/26/2021   • Recurrent sinusitis 05/21/2021   • Cardiac volume overload secondary to a/c diastolic CHF 05/13/2021   • Adrenal insufficiency (Valentín's disease) ([facility]) 05/12/2021   • Inappropriate sinus tachycardia 05/11/2021   • FLORES (dyspnea on exertion) 05/10/2021   • Lactic acidosis 05/10/2021   • Pulmonary embolism ([facility]) 05/10/2021   • Hx of migraines 05/10/2021   • Hypogammaglobulinemia ([facility]) 05/03/2021   • Acute pulmonary embolism ([facility]) 04/28/2021   • Acute sinusitis 04/27/2021   • Elevated LFTs 04/27/2021   • MARY LOU (acute kidney injury) ([facility]) 01/26/2021   • Tachycardia 03/16/2020   • Thyroid disease 03/16/2020   • Hypertension 03/16/2020   • Normocytic anemia 01/24/2020   • Orthostatic hypotension 11/11/2019   • Hypothyroidism 11/05/2019   • Chronic cough 05/13/2019   • Chronic rhinitis 05/13/2019   • Need for vaccination 03/15/2019   • Rash on lips 03/15/2019   • Tear of left rotator cuff s/p repair  03/15/2019   • CKD (chronic kidney disease) stage 3, GFR 30-59 ml/min ([facility]) 03/15/2019   • Obesity (BMI 35.0-39.9 without comorbidity) 12/03/2018   • Hx of gastric bypass in 2009 Decmber 12/03/2018   • Gastroesophageal reflux disease without esophagitis 10/11/2018   • Dry mouth in setting of trauma 2001 10/11/2018   • Muscle spasm 10/11/2018   • Fibromyalgia 10/11/2018   • Multiple allergies 10/11/2018   • Chronic migraine without aura, not intractable 10/11/2018   • Chronic systolic heart failure (HCC) 10/11/2018   • Aortic root dilatation ([facility]) 10/11/2018   • Pain in joint of left shoulder 03/26/2018   • Closed fracture of right wrist 03/26/2018   • Closed fracture of distal end of right radius 01/24/2018   • Depression 01/16/2018   • 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 03/07/2017   • Recurrent bacterial infection 03/07/2017   • Osteoporosis 02/22/2017   • Esophageal stricture 08/01/2016   • Advance directive on file 05/16/2016   • Central perforation of tympanic membrane of right ear 10/26/2015   • Syncope due to orthostatic hypotension 09/30/2015   • Seizure ([facility]) 09/29/2015   • History of hysterectomy for benign disease 05/15/2015   • Shoulder impingement 12/30/2014   • Osteoarthritis of right hip 05/14/2014   • History of abnormal Pap smear 02/11/2014   • IBS (irritable bowel syndrome) 09/17/2013   • Asthma 10/26/2012   • Anemia, iron deficiency, inadequate dietary intake 03/19/2009     Prior Living Situation:  Housing / Facility: 2 Story House  Lives with - Patient's Self Care Capacity: Spouse, Unrelated Adult    Prior Level of Function:  Medication Management: Independent  Finances: Independent  Home Management: Requires Assist  Shopping: Requires Assist  Prior Level Of Mobility: Independent With Device in Home  Driving / Transportation: Relatives / Others Provide Transportation    Support Systems:  Primary : Colin Isaac, , [phone]    Previous Services Utilized:   Equipment Owned:  None (Borrowing FWW, 4WW, shower chair)  Prior Services: Home-Independent    Other Information:      Primary Payor Source: Medicare A, Medicare B  Secondary Payor Source: Supplemental Insurance  Primary Care Practitioner : Madisyn Mccullough MD    Patient / Family Goal:  Patient / Family Goal: to get stronger and to get medications regulated to prevent syncopal episodes    Additional Case Management Questions:  Have you ever received case management services for yourself or a family member?    Do you feel you have and an understanding of what services  provide?    Do you have any additional questions regarding case management?          CASE MANAGEMENT PLAN OF CARE   Individualized Goals:   1.  to get stronger  2.   to get medications regulated to prevent syncopal episodes  3.    Barriers:   1.  Functional deficits  2.  3.    Plan:  1. Continue to follow patient through hospitalization and provide discharge planning in collaboration with patient, family, physicians and ancillary services.     2. Utilize community resources to ensure a safe discharge.        No

## 2021-07-01 ENCOUNTER — APPOINTMENT (OUTPATIENT)
Dept: RADIOLOGY | Facility: REHABILITATION | Age: 56
DRG: 644 | End: 2021-07-01
Attending: HOSPITALIST
Payer: MEDICARE

## 2021-07-01 PROCEDURE — 700102 HCHG RX REV CODE 250 W/ 637 OVERRIDE(OP): Performed by: PHYSICAL MEDICINE & REHABILITATION

## 2021-07-01 PROCEDURE — 97116 GAIT TRAINING THERAPY: CPT

## 2021-07-01 PROCEDURE — 76705 ECHO EXAM OF ABDOMEN: CPT

## 2021-07-01 PROCEDURE — 700101 HCHG RX REV CODE 250

## 2021-07-01 PROCEDURE — 700111 HCHG RX REV CODE 636 W/ 250 OVERRIDE (IP): Performed by: PHYSICAL MEDICINE & REHABILITATION

## 2021-07-01 PROCEDURE — A9270 NON-COVERED ITEM OR SERVICE: HCPCS | Performed by: PHYSICAL MEDICINE & REHABILITATION

## 2021-07-01 PROCEDURE — 700102 HCHG RX REV CODE 250 W/ 637 OVERRIDE(OP): Performed by: HOSPITALIST

## 2021-07-01 PROCEDURE — 770010 HCHG ROOM/CARE - REHAB SEMI PRIVAT*

## 2021-07-01 PROCEDURE — A9270 NON-COVERED ITEM OR SERVICE: HCPCS | Performed by: HOSPITALIST

## 2021-07-01 PROCEDURE — 97110 THERAPEUTIC EXERCISES: CPT

## 2021-07-01 PROCEDURE — 99231 SBSQ HOSP IP/OBS SF/LOW 25: CPT | Performed by: HOSPITALIST

## 2021-07-01 PROCEDURE — 700101 HCHG RX REV CODE 250: Performed by: PHYSICAL MEDICINE & REHABILITATION

## 2021-07-01 PROCEDURE — 97535 SELF CARE MNGMENT TRAINING: CPT

## 2021-07-01 PROCEDURE — 99233 SBSQ HOSP IP/OBS HIGH 50: CPT | Performed by: PHYSICAL MEDICINE & REHABILITATION

## 2021-07-01 PROCEDURE — 97530 THERAPEUTIC ACTIVITIES: CPT

## 2021-07-01 RX ORDER — POTASSIUM CHLORIDE 20 MEQ/1
10 TABLET, EXTENDED RELEASE ORAL DAILY
Status: DISCONTINUED | OUTPATIENT
Start: 2021-07-01 | End: 2021-07-03

## 2021-07-01 RX ORDER — WATER 10 ML/10ML
INJECTION INTRAMUSCULAR; INTRAVENOUS; SUBCUTANEOUS
Status: COMPLETED
Start: 2021-07-01 | End: 2021-07-01

## 2021-07-01 RX ORDER — FUROSEMIDE 20 MG/1
20 TABLET ORAL
Status: DISCONTINUED | OUTPATIENT
Start: 2021-07-01 | End: 2021-07-03

## 2021-07-01 RX ADMIN — APIXABAN 5 MG: 5 TABLET, FILM COATED ORAL at 08:58

## 2021-07-01 RX ADMIN — CARVEDILOL 3.12 MG: 3.12 TABLET, FILM COATED ORAL at 08:59

## 2021-07-01 RX ADMIN — POTASSIUM CHLORIDE 10 MEQ: 1500 TABLET, EXTENDED RELEASE ORAL at 12:18

## 2021-07-01 RX ADMIN — CARVEDILOL 3.12 MG: 3.12 TABLET, FILM COATED ORAL at 17:45

## 2021-07-01 RX ADMIN — SENNOSIDES AND DOCUSATE SODIUM 2 TABLET: 8.6; 5 TABLET ORAL at 08:58

## 2021-07-01 RX ADMIN — LIOTHYRONINE SODIUM 25 MCG: 25 TABLET ORAL at 21:06

## 2021-07-01 RX ADMIN — COLESEVELAM HYDROCHLORIDE 625 MG: 625 TABLET, FILM COATED ORAL at 08:58

## 2021-07-01 RX ADMIN — APIXABAN 5 MG: 5 TABLET, FILM COATED ORAL at 21:05

## 2021-07-01 RX ADMIN — CHOLECALCIFEROL TAB 25 MCG (1000 UNIT) 2000 UNITS: 25 TAB at 08:57

## 2021-07-01 RX ADMIN — GABAPENTIN 400 MG: 400 CAPSULE ORAL at 21:04

## 2021-07-01 RX ADMIN — MONTELUKAST 10 MG: 10 TABLET, FILM COATED ORAL at 21:06

## 2021-07-01 RX ADMIN — FUROSEMIDE 20 MG: 20 TABLET ORAL at 12:19

## 2021-07-01 RX ADMIN — COLESEVELAM HYDROCHLORIDE 625 MG: 625 TABLET, FILM COATED ORAL at 21:06

## 2021-07-01 RX ADMIN — GABAPENTIN 400 MG: 400 CAPSULE ORAL at 08:57

## 2021-07-01 RX ADMIN — FLUDROCORTISONE ACETATE 0.1 MG: 0.1 TABLET ORAL at 06:16

## 2021-07-01 RX ADMIN — HYDROCORTISONE 20 MG: 10 TABLET ORAL at 08:57

## 2021-07-01 RX ADMIN — LEVOTHYROXINE SODIUM 75 MCG: 75 TABLET ORAL at 05:30

## 2021-07-01 RX ADMIN — CALCITONIN SALMON 200 UNITS: 200 SPRAY, METERED NASAL at 21:27

## 2021-07-01 RX ADMIN — LIDOCAINE 1 PATCH: 50 PATCH TOPICAL at 09:01

## 2021-07-01 RX ADMIN — HYDROCORTISONE 10 MG: 10 TABLET ORAL at 15:29

## 2021-07-01 RX ADMIN — ALTEPLASE 2 MG: 2.2 INJECTION, POWDER, LYOPHILIZED, FOR SOLUTION INTRAVENOUS at 11:49

## 2021-07-01 RX ADMIN — OMEPRAZOLE 20 MG: 20 CAPSULE, DELAYED RELEASE ORAL at 08:57

## 2021-07-01 RX ADMIN — DULOXETINE 60 MG: 30 CAPSULE, DELAYED RELEASE ORAL at 21:05

## 2021-07-01 RX ADMIN — COLESEVELAM HYDROCHLORIDE 625 MG: 625 TABLET, FILM COATED ORAL at 15:29

## 2021-07-01 RX ADMIN — ACETAMINOPHEN 650 MG: 325 TABLET, FILM COATED ORAL at 15:27

## 2021-07-01 RX ADMIN — WATER 2 ML: 1 INJECTION INTRAMUSCULAR; INTRAVENOUS; SUBCUTANEOUS at 10:45

## 2021-07-01 RX ADMIN — AMITRIPTYLINE HYDROCHLORIDE 10 MG: 10 TABLET, FILM COATED ORAL at 08:59

## 2021-07-01 ASSESSMENT — ENCOUNTER SYMPTOMS
DIZZINESS: 0
BLURRED VISION: 0
COUGH: 0
FEVER: 0
DIARRHEA: 0
NERVOUS/ANXIOUS: 0

## 2021-07-01 ASSESSMENT — PAIN DESCRIPTION - PAIN TYPE: TYPE: ACUTE PAIN

## 2021-07-01 ASSESSMENT — GAIT ASSESSMENTS
DEVIATION: TRENDELENBERG;BRADYKINETIC
DISTANCE (FEET): 30
GAIT LEVEL OF ASSIST: CONTACT GUARD ASSIST

## 2021-07-01 ASSESSMENT — ACTIVITIES OF DAILY LIVING (ADL)
TOILETING_LEVEL_OF_ASSIST_DESCRIPTION: GRAB BAR;INCREASED TIME;SUPERVISION FOR SAFETY;VERBAL CUEING
TOILET_TRANSFER_DESCRIPTION: GRAB BAR;INCREASED TIME;SET-UP OF EQUIPMENT
BED_CHAIR_WHEELCHAIR_TRANSFER_DESCRIPTION: ADAPTIVE EQUIPMENT;VERBAL CUEING
TOILET_TRANSFER_DESCRIPTION: GRAB BAR;SET-UP OF EQUIPMENT;SUPERVISION FOR SAFETY;VERBAL CUEING
BED_CHAIR_WHEELCHAIR_TRANSFER_DESCRIPTION: SET-UP OF EQUIPMENT;SUPERVISION FOR SAFETY

## 2021-07-01 NOTE — PROGRESS NOTES
"Rehab Progress Note     Encounter Date: 7/1/2021    CC: Orthostatic hypotension, syncope    Interval Events (Subjective)  Patient sitting up in room. She reports she is doing well. There are some issues with her IV antibiotics and getting the prescription. Working with pharmacy depending on how long she will be here for to decide about antibiotics. Patient reports no recent syncope or BP drop.  She reports she thinks the current regimen are working. Discussed would have IDT later today. Denies NVD. Denies SOB.     IDT Team Meeting 7/1/2021    IDarion M.D., was present and led the interdisciplinary team conference on 7/1/2021.  I led the IDT conference and agree with the IDT conference documentation and plan of care as noted below.     RN:  Diet Regular   % Meal     Pain    Sleep    Bowel InContinent   Bladder Continent   In's & Out's    Pain controlled    PT:  Bed Mobility    Transfers CGA   Mobility maxA for safety   Stairs    Improving rapidly with good safety awareness    OT: 0 of 3  Eating    Grooming    Bathing CGA   UB Dressing    LB Dressing maxA   Toileting Ricardo   Shower & Transfer CGA   Improving     CM:  Continues to work on disposition and DME needs.      DC/Disposition:  7/8/21    Objective:  VITAL SIGNS: /82   Pulse 68   Temp 36.4 °C (97.6 °F) (Oral)   Resp 17   Ht 1.626 m (5' 4\")   Wt 100 kg (220 lb 7.4 oz)   LMP  (LMP Unknown)   SpO2 97%   BMI 37.84 kg/m²   Gen: NAD  Psych: Mood and affect appropriate  CV: RRR, no edema  Resp: CTAB, no upper airway sounds  Abd: NTND  Neuro: AOx4, following commands    Recent Results (from the past 72 hour(s))   SARS-CoV-2 PCR (24 hour In-House): Collect NP swab in VTM    Collection Time: 06/28/21  2:00 PM    Specimen: Nasopharyngeal; Respirate   Result Value Ref Range    SARS-CoV-2 Source NP Swab     SARS-CoV-2 by PCR NotDetected    CBC with Differential    Collection Time: 06/29/21  6:06 AM   Result Value Ref Range    WBC 7.6 4.8 - " 10.8 K/uL    RBC 4.35 4.20 - 5.40 M/uL    Hemoglobin 13.3 12.0 - 16.0 g/dL    Hematocrit 40.7 37.0 - 47.0 %    MCV 93.6 81.4 - 97.8 fL    MCH 30.6 27.0 - 33.0 pg    MCHC 32.7 (L) 33.6 - 35.0 g/dL    RDW 58.7 (H) 35.9 - 50.0 fL    Platelet Count 165 164 - 446 K/uL    MPV 11.9 9.0 - 12.9 fL    Neutrophils-Polys 62.70 44.00 - 72.00 %    Lymphocytes 24.00 22.00 - 41.00 %    Monocytes 10.30 0.00 - 13.40 %    Eosinophils 1.90 0.00 - 6.90 %    Basophils 0.70 0.00 - 1.80 %    Immature Granulocytes 0.40 0.00 - 0.90 %    Nucleated RBC 0.30 /100 WBC    Neutrophils (Absolute) 4.74 2.00 - 7.15 K/uL    Lymphs (Absolute) 1.81 1.00 - 4.80 K/uL    Monos (Absolute) 0.78 0.00 - 0.85 K/uL    Eos (Absolute) 0.14 0.00 - 0.51 K/uL    Baso (Absolute) 0.05 0.00 - 0.12 K/uL    Immature Granulocytes (abs) 0.03 0.00 - 0.11 K/uL    NRBC (Absolute) 0.02 K/uL   Comp Metabolic Panel (CMP)    Collection Time: 06/29/21  6:06 AM   Result Value Ref Range    Sodium 139 135 - 145 mmol/L    Potassium 3.9 3.6 - 5.5 mmol/L    Chloride 108 96 - 112 mmol/L    Co2 22 20 - 33 mmol/L    Anion Gap 9.0 7.0 - 16.0    Glucose 94 65 - 99 mg/dL    Bun 15 8 - 22 mg/dL    Creatinine 0.49 (L) 0.50 - 1.40 mg/dL    Calcium 8.3 (L) 8.5 - 10.5 mg/dL    AST(SGOT) 135 (H) 12 - 45 U/L    ALT(SGPT) 216 (H) 2 - 50 U/L    Alkaline Phosphatase 224 (H) 30 - 99 U/L    Total Bilirubin 1.7 (H) 0.1 - 1.5 mg/dL    Albumin 2.2 (L) 3.2 - 4.9 g/dL    Total Protein 4.4 (L) 6.0 - 8.2 g/dL    Globulin 2.2 1.9 - 3.5 g/dL    A-G Ratio 1.0 g/dL   HEMOGLOBIN A1C    Collection Time: 06/29/21  6:06 AM   Result Value Ref Range    Glycohemoglobin 6.0 (H) 4.0 - 5.6 %    Est Avg Glucose 126 mg/dL   TSH with Reflex to FT4    Collection Time: 06/29/21  6:06 AM   Result Value Ref Range    TSH 0.020 (L) 0.380 - 5.330 uIU/mL   Vitamin D, 25-hydroxy (blood)    Collection Time: 06/29/21  6:06 AM   Result Value Ref Range    25-Hydroxy   Vitamin D 25 47 30 - 100 ng/mL   FREE THYROXINE    Collection Time:  21  6:06 AM   Result Value Ref Range    Free T-4 1.27 0.93 - 1.70 ng/dL   ESTIMATED GFR    Collection Time: 21  6:06 AM   Result Value Ref Range    GFR If African American >60 >60 mL/min/1.73 m 2    GFR If Non African American >60 >60 mL/min/1.73 m 2   POCT glucose device results    Collection Time: 21  7:51 AM   Result Value Ref Range    Glucose - Accu-Ck 90 65 - 99 mg/dL   EKG    Collection Time: 21  9:00 AM   Result Value Ref Range    Report       [facility]    Test Date:  2021  Pt Name:    CARMINA MORAN              Department: ER  MRN:        [id number]                      Room:        22  Gender:     Female                       Technician: [id number]  :        1965                   Requested By:CIPRIANO SHERMAN  Order #:    [id number]                    Reading MD:    Measurements  Intervals                                Axis  Rate:       109                          P:          53  MO:         204                          QRS:        4  QRSD:       72                           T:          176  QT:         288  QTc:        388    Interpretive Statements  SINUS TACHYCARDIA  BORDERLINE AV CONDUCTION DELAY  ABNORMAL T, CONSIDER ISCHEMIA, LATERAL LEADS  BASELINE WANDER IN LEAD(S) II,III,aVF  Compared to ECG 2021 15:51:16  T-wave abnormality now present  Possible ischemia now present  Sinus bradycardia no longer present  First degree AV block no longer present     Comp Metabolic Panel    Collection Time: 21  5:26 AM   Result Value Ref Range    Sodium 141 135 - 145 mmol/L    Potassium 3.7 3.6 - 5.5 mmol/L    Chloride 110 96 - 112 mmol/L    Co2 22 20 - 33 mmol/L    Anion Gap 9.0 7.0 - 16.0    Glucose 100 (H) 65 - 99 mg/dL    Bun 16 8 - 22 mg/dL    Creatinine 0.61 0.50 - 1.40 mg/dL    Calcium 7.9 (L) 8.5 - 10.5 mg/dL    AST(SGOT) 103 (H) 12 - 45 U/L    ALT(SGPT) 169 (H) 2 - 50 U/L    Alkaline Phosphatase 215 (H) 30 - 99 U/L    Total  Bilirubin 1.8 (H) 0.1 - 1.5 mg/dL    Albumin 2.0 (L) 3.2 - 4.9 g/dL    Total Protein 4.1 (L) 6.0 - 8.2 g/dL    Globulin 2.1 1.9 - 3.5 g/dL    A-G Ratio 1.0 g/dL   MAGNESIUM    Collection Time: 06/30/21  5:26 AM   Result Value Ref Range    Magnesium 1.7 1.5 - 2.5 mg/dL   PHOSPHORUS    Collection Time: 06/30/21  5:26 AM   Result Value Ref Range    Phosphorus 3.6 2.5 - 4.5 mg/dL   IRON/TOTAL IRON BIND    Collection Time: 06/30/21  5:26 AM   Result Value Ref Range    Iron 136 40 - 170 ug/dL    Total Iron Binding 153 (L) 250 - 450 ug/dL    Unsat Iron Binding <17 (L) 110 - 370 ug/dL    % Saturation 89 (H) 15 - 55 %   FERRITIN    Collection Time: 06/30/21  5:26 AM   Result Value Ref Range    Ferritin 94.1 10.0 - 291.0 ng/mL   HEPATITIS PANEL ACUTE(4 COMPONENTS)    Collection Time: 06/30/21  5:26 AM   Result Value Ref Range    Hepatitis B Surface Antigen Non-Reactive Non-Reactive    Hepatitis B Cors Ab,IgM Non-Reactive Non-Reactive    Hepatitis A Virus Ab, IgM Non-Reactive Non-Reactive    Hepatitis C Antibody Non-Reactive Non-Reactive   proBrain Natriuretic Peptide, NT    Collection Time: 06/30/21  5:26 AM   Result Value Ref Range    NT-proBNP 883 (H) 0 - 125 pg/mL   ESTIMATED GFR    Collection Time: 06/30/21  5:26 AM   Result Value Ref Range    GFR If African American >60 >60 mL/min/1.73 m 2    GFR If Non African American >60 >60 mL/min/1.73 m 2       Current Facility-Administered Medications   Medication Frequency   • furosemide (LASIX) tablet 20 mg Q DAY   • potassium chloride SA (Kdur) tablet 10 mEq DAILY   • [START ON 7/4/2021] Eravacycline Dihydrochloride (XERAVA) 100 mg in  mL IVPB Q12HRS   • senna-docusate (PERICOLACE or SENOKOT S) 8.6-50 MG per tablet 2 tablet BID   • polyethylene glycol/lytes (MIRALAX) PACKET 1 Packet QDAY PRN   • magnesium hydroxide (MILK OF MAGNESIA) suspension 30 mL QDAY PRN   • bisacodyl (DULCOLAX) suppository 10 mg QDAY PRN   • ondansetron (ZOFRAN ODT) dispertab 4 mg 4X/DAY PRN   •  ondansetron (ZOFRAN) syringe/vial injection 4 mg 4X/DAY PRN   • fludrocortisone (FLORINEF) tablet 0.1 mg QAM   • hydrocortisone (CORTEF) tablet 10 mg DAILY   • hydrocortisone (CORTEF) tablet 20 mg DAILY   • vitamin D (cholecalciferol) tablet 2,000 Units DAILY   • tizanidine (ZANAFLEX) tablet 4 mg QDAY PRN   • SUMAtriptan (IMITREX) tablet 50 mg Q2HRS PRN   • omeprazole (PRILOSEC) capsule 20 mg DAILY   • montelukast (SINGULAIR) tablet 10 mg Q EVENING   • liothyronine (CYTOMEL) tablet 25 mcg QHS   • levothyroxine (SYNTHROID) tablet 75 mcg AM ES   • gabapentin (NEURONTIN) capsule 400 mg BID   • Eravacycline Dihydrochloride 100 mg BID   • DULoxetine (CYMBALTA) capsule 60 mg QHS   • colesevelam (WELCHOL) tablet 625 mg TID   • carvedilol (COREG) tablet 3.125 mg BID WITH MEALS   • calcitonin (salmon) (MIACALCIN) nasal spray 200 Units QHS   • apixaban (ELIQUIS) tablet 5 mg BID   • amitriptyline (ELAVIL) tablet 10 mg DAILY   • sodium chloride (OCEAN) 0.65 % nasal spray 2 Spray PRN   • traZODone (DESYREL) tablet 50 mg QHS PRN   • mag hydrox-al hydrox-simeth (MAALOX PLUS ES or MYLANTA DS) suspension 20 mL Q2HRS PRN   • benzocaine-menthol (CEPACOL) lozenge 1 Lozenge Q2HRS PRN   • artificial tears ophthalmic solution 1 Drop PRN   • acetaminophen (Tylenol) tablet 650 mg Q4HRS PRN   • hydrALAZINE (APRESOLINE) tablet 25 mg Q8HRS PRN   • lidocaine (LIDODERM) 5 % 1 Patch DAILY       Orders Placed This Encounter   Procedures   • Diet Order Diet: Regular     Standing Status:   Standing     Number of Occurrences:   1     Order Specific Question:   Diet:     Answer:   Regular [1]       Assessment:  Active Hospital Problems    Diagnosis    • Syncope    • Transaminitis    • High potassium    • History of pulmonary embolism    • Adrenal insufficiency ([place]'s disease) (HCC)    • Hypertension    • Tachycardia    • Orthostatic hypotension    • Hypothyroidism    • Chronic systolic heart failure (HCC)    • Gastroesophageal reflux disease  without esophagitis    • Depression        Medical Decision Making and Plan:  Orthostatic hypotension - Probably a combination of volume depletion and underlying [place]'s disease, was started on Florinef at [facility] as well as SILVERIO hoses and abdominal binder  -PT and OT for mobility and ADLs  -Continue SILVERIO hoses and abdominal binder  -On Florinef 0.1 mg daily. Has CHF so needs to be on ARB and BB at lowest dose. Consider midodrine.   -Consult hospitalist. Syncope on day of evaluation while in shower 6/29     [place]'s disease - Patient on Hydrocortisone 10 mg BID. Now on Florinef. Will monitor electrolytes.   -Consult Hospitalist, considering increase in Hydrocortisone level. Currently 20 mg qAM and 10 mg q 1PM. Also changed Florinef to earlier morning dose     CHF - Patient on Coreg 3.125 mg and Losartan 25 mg daily     Neuropathy - Patient on Gabapentin 400 mg BID     HLD - Patient on welchol.      CKD - Avoid nephrotoxic agents. Check AM CMP     Anemia - Check AM CBC - resolved     Mood/Depression/Pain - Patient is on Cymbalta 60 mg and Amitriptyline 10 mg daily.      Elevated LFTs - Elevated on admission into 200s. Will monitor, was previously downtrending.   -Into 100s on repeat, will monitor    Asthma/COPD - Patient on Singulair on transfer.      Hypothyroidism - Patient on Levothyroxine 75 mcg and Cytomel 25 mcg QHS. Low TSH on admission, normal T4    MRSA sinusitis - Prolonged treatment, with multiple allergies to antibiotics. On Eravacycline 100 mg BID. Working with pharmacy on prescription of medication     Morbid Obesity due to excess calories - BMI of 37.84 on admission, meets medical criteria. Dietitian to follow     GERD -Patient on omeprazole on transfer.      Hx of PE - Patient on Eliquis    Total time:  36 minutes.  I spent greater than 50% of the time for patient care, counseling, and coordination on this date, including unit/floor time, and face-to-face time with the patient as per interval events and  assessment and plan above. Topics discussed included discharge planning, Rx for antibiotics, improving SBP, and no syncope. Patient was discussed separately in IDT today; please see details above.    Darion Metzger M.D.

## 2021-07-01 NOTE — DISCHARGE PLANNING
Outpatient therapy referral sent to [facility] in [place] per choice form.  Awaiting response.

## 2021-07-01 NOTE — THERAPY
Occupational Therapy  Daily Treatment     Patient Name: Donna Isaac  Age:  55 y.o., Sex:  female  Medical Record #: [id number]  Today's Date: 7/1/2021     Precautions  Precautions: (P) Fall Risk, Other (See Comments)  Comments: (P) Orthostatic Hypotension, hx of falls,  abdominal binder OOB, R ankle brace, [place] (deaf R ear)    Safety   ADL Safety : Requires Supervision for Safety, Requires Physical Assist for Safety, Impaired Insight into Safety, Impaired  Bathroom Safety: Requires Supervision for Safety, Requires Physical Assist for Safety, Impaired Insight into Safety, Impaired  Comments: see below functional levels for ADL performance details.     Subjective    Pt agreeable to therapy     Objective       07/01/21 1301   Precautions   Precautions Fall Risk;Other (See Comments)   Comments Orthostatic Hypotension, hx of falls,  abdominal binder OOB, R ankle brace, [place] (deaf R ear)   Vitals   Pulse 86   Patient BP Position Sitting   Blood Pressure 125/85   Functional Level of Assist   Lower Body Dressing Minimal Assist  (doffing/donning new brief while seated on toilet)   Lower Body Dressing Description Assist with threading into pant leg;Set-up of equipment;Initial preparation for task;Increased time;Verbal cueing   Toileting Stand by Assist   Toileting Description Grab bar;Initial preparation for task;Verbal cueing;Supervision for safety   Bed, Chair, Wheelchair Transfer Contact Guard Assist   Bed Chair Wheelchair Transfer Description Set-up of equipment;Supervision for safety   Toilet Transfers Contact Guard Assist   Toilet Transfer Description Grab bar;Increased time;Set-up of equipment   Sitting Upper Body Exercises   Upper Extremity Bike Level 5 Resistance  (motomed, 3 minutes)   Interdisciplinary Plan of Care Collaboration   IDT Collaboration with  Nursing   Patient Position at End of Therapy In Bed;Call Light within Reach;Tray Table within Reach;Phone within Reach   Collaboration Comments medication    OT Total Time Spent   OT Individual Total Time Spent (Mins) 30   OT Charge Group   OT Self Care / ADL 2       Assessment    Pt completed toileting in hallway bathroom to practice community toileting. Pt educated therapist on her orthostatic hypotension and when to be close to her because she can easily pass out. Pt demonstrated an increase in independence while completing LB dressing requiring min A to thread RLE into brief.   Strengths: Pleasant and cooperative, Willingly participates in therapeutic activities  Barriers: Decreased endurance, Fatigue, Generalized weakness, Hearing impairment, Orthostatic hypotension, Impaired activity tolerance, Impaired balance, Limited mobility    Plan    ADLs, IADLs, functional mobility, AE education, strength/endurance building, balance.      Passport items to be completed:  Passport items to be completed:  Perform bathroom transfers, complete dressing, complete feeding, get ready for the day, prepare a simple meal, participate in household tasks, adapt home for safety needs, demonstrate home exercise program, complete caregiver training     Occupational Therapy Goals (Active)     Problem: Dressing     Dates: Start: 06/29/21       Goal: STG-Within one week, patient will dress LB     Dates: Start: 06/29/21       Goal Note filed on 07/01/21 1139 by Dennis Alcantar MS,OTR/L     Max assist to manage LB clothing - Total assist for SILVERIO hose and R ankle brace, Max assist to don shoes, Min assist to manage briefs               Problem: Functional Transfers     Dates: Start: 06/29/21       Goal: STG-Within one week, patient will transfer to toilet     Dates: Start: 06/29/21       Goal Note filed on 07/01/21 1139 by Dennis Alcantar MS,OTR/L     CGA via DME               Problem: OT Long Term Goals     Dates: Start: 06/29/21       Goal: LTG-By discharge, patient will complete basic self care tasks     Dates: Start: 06/29/21       Goal Note filed on 06/29/21 0823 by Dennis Alcantar MS,OTR/L     1)  Individualized Goal:  Mod I for BADL's via AE/DME PRN  2) Interventions:  OT Self Care/ADL, OT Neuro Re-Ed/Balance, OT Therapeutic Activity, OT Evaluation, and OT Therapeutic Exercise            Goal: LTG-By discharge, patient will perform bathroom transfers     Dates: Start: 06/29/21       Goal Note filed on 06/29/21 0832 by Dennis Alcantar MS,OTR/L     1) Individualized Goal:  Mod I for bathroom transfers via DME PRN  2) Interventions: OT Self Care/ADL, OT Neuro Re-Ed/Balance, OT Therapeutic Activity, OT Evaluation, and OT Therapeutic Exercise               Problem: Toileting     Dates: Start: 06/29/21       Goal: STG-Within one week, patient will complete toileting tasks     Dates: Start: 06/29/21       Goal Note filed on 07/01/21 1139 by Dennis Alcantar MS,OTR/L     CGA/Min assist

## 2021-07-01 NOTE — CARE PLAN
The patient is Watcher - Medium risk of patient condition declining or worsening    Problem: Pain - Standard  Goal: Alleviation of pain or a reduction in pain to the patient’s comfort goal  Outcome: Progressing  Flowsheets (Taken 6/30/2021 2100)  Pain Rating Scale (NPRS): 4  Note: Patient given an ice pack for back pain       Problem: Skin Integrity  Goal: Skin integrity is maintained or improved  Outcome: Progressing     Problem: Fall Risk - Rehab  Goal: Patient will remain free from falls  Outcome: Progressing  Note: Call light within reach, patient educated and encouraged to use for assistance for any needs and for assistance for safe transferring.

## 2021-07-01 NOTE — PROGRESS NOTES
[facility] Medicine Daily Progress Note    Date of Service  7/1/2021    Chief Complaint:  Syncope  Orthostatic hypotension   AI  Transaminitis    Interval History:  No significant events or changes since last visit    Review of Systems  Review of Systems   Constitutional: Negative for fever.   Eyes: Negative for blurred vision.   Respiratory: Negative for cough.    Cardiovascular: Positive for leg swelling. Negative for chest pain.   Gastrointestinal: Negative for diarrhea.   Musculoskeletal: Negative for joint pain.   Neurological: Negative for dizziness.   Psychiatric/Behavioral: The patient is not nervous/anxious.         Physical Exam  Temp:  [36.4 °C (97.6 °F)-37.1 °C (98.8 °F)] 36.4 °C (97.6 °F)  Pulse:  [68-78] 68  Resp:  [17-20] 17  BP: (112-139)/() 133/82  SpO2:  [91 %-97 %] 97 %    Physical Exam  Vitals and nursing note reviewed.   Constitutional:       Appearance: She is not diaphoretic.   HENT:      Mouth/Throat:      Pharynx: No oropharyngeal exudate or posterior oropharyngeal erythema.   Eyes:      Extraocular Movements: Extraocular movements intact.   Neck:      Vascular: No carotid bruit or JVD.   Cardiovascular:      Rate and Rhythm: Normal rate and regular rhythm.      Heart sounds: Normal heart sounds. No murmur heard.     Pulmonary:      Effort: Pulmonary effort is normal.      Breath sounds: Normal breath sounds. No stridor.   Abdominal:      General: Bowel sounds are normal.      Palpations: Abdomen is soft.   Musculoskeletal:      Right lower leg: No edema.      Left lower leg: No edema.   Skin:     General: Skin is warm and dry.      Findings: No rash.   Neurological:      Mental Status: She is alert and oriented to person, place, and time.   Psychiatric:         Mood and Affect: Mood normal.         Behavior: Behavior normal.         Fluids    Intake/Output Summary (Last 24 hours) at 7/1/2021 0910  Last data filed at 6/30/2021 1209  Gross per 24 hour   Intake 360 ml   Output --   Net 360  ml       Laboratory  Recent Labs     06/29/21  0606   WBC 7.6   RBC 4.35   HEMOGLOBIN 13.3   HEMATOCRIT 40.7   MCV 93.6   MCH 30.6   MCHC 32.7*   RDW 58.7*   PLATELETCT 165   MPV 11.9     Recent Labs     06/29/21  0606 06/30/21  0526   SODIUM 139 141   POTASSIUM 3.9 3.7   CHLORIDE 108 110   CO2 22 22   GLUCOSE 94 100*   BUN 15 16   CREATININE 0.49* 0.61   CALCIUM 8.3* 7.9*                   Imaging    Assessment/Plan  Transaminitis  Assessment & Plan  Has an on & off hx -- rises up and then seems to self correct  LFT's elevated (6/29) --> now decreasing (6/30)  Hep profile wnl  Fe 136, sats 89%  US liver: heterogeneous and echogenic appearance of the liver can be seen in hepatic steatosis or hepatocellular disease.                mild prominence of the common duct likely represents ectasia in the setting of prior cholecystectomy.                aorta, IVC and pancreas are obscured by overlying bowel gas, limiting evaluation.  ? 2nd to meds  Note: has hx of hemachromatosis  Monitor    Syncope  Assessment & Plan  Has hx  Started about 5 years ago in [place] and then seemed to resolve  Pt states that over the last few months it has gotten worse  Has recent hx of mult hosp adms for syncope  Had syncope on am 6/29 while sitting in the shower and hit head -- CT head in ER ok  ? 2nd to AI and Orthostatic hypotension  Has abn TFT's -- see other assessment  Has recent PE and is on Eliquis  BNP: 176 (5/10) --> 20,599 (5/14) --> 2165 (5/25) --> 883 (6/30)  Echo (5/10): EF > 70%  TropI: has multiple studies recently -- unremarkable  TFT's -- see other assessment  Off Cozaar (was on for a few months) -- has SE of orthostatic hypotension  On Cortef: 20 mg qam and 10 mg afternoon  On Florinef  Note: to f/u with her Neurologist for autonomic disorder  Monitor    History of pulmonary embolism- (present on admission)  Assessment & Plan  Suspected 2nd to the Covid vaccine  May also be 2nd to immobility at home (but no DVT)  On  Eliquis    Adrenal insufficiency (Hardee's disease) (HCC)- (present on admission)  Assessment & Plan  Has seen Endocrine and suspects has AI  Has hx of occ low BP, orthostatic hypotension and syncope  Has abn TFT's -- see other assessment  Recently pt has been on several different doses of Cortef  On Cortef: 20 mg qam and 10 mg afternoon   On Florinef qam  D/W pt's Endocrinologist: she should also be on a growth hormone -- was on Zomacton before                                            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 Canales [phone]  D/W patient: will have  bring in home med Zomacton    Hypertension- (present on admission)  Assessment & Plan  BP ok  Has orthostatic hypotension  Off Cozaar --> 2nd to SE of orthostatics  On Coreg  Cont to monitor    Tachycardia- (present on admission)  Assessment & Plan  HR labile -- recently ok  HR occ rises up for no apparent reason and then self corrects   ? POTS (also has orthostatic hypotension)  On Coreg  Pt drinking enough water  Sodium levels ok  Monitor    Orthostatic hypotension- (present on admission)  Assessment & Plan  Has hx  Has abn TFT's -- see other assessment  Off Cozaar (has ortho SE)  On Cortef: 20 mg qam and 10 mg afternoon  On Florinef qam  Monitor    Hypothyroidism- (present on admission)  Assessment & Plan  TSH low at 0.02  FT4 ok at 1.27  Past TFT's has been quite variable  On Synthroid  On Cytomel  D/W pt's Endocrinologist -- these variations are due to the pt's complex medical situation    Depression- (present on admission)  Assessment & Plan  On Cymbalta    Chronic systolic heart failure (HCC)- (present on admission)  Assessment & Plan  Compensated  Echo (5/10): EF > 70%  BNP: 176 (5/10) --> 20,599 (5/14) --> 2,165 (5/25) --> 883 (6/30)  Pt feels like her legs are becoming more swollen recently  Off Cozaar -- 2nd to possible orthostatic SE  On Coreg  Will restart Lasix  Will start K+  supplements  Note: was previously on Lasix 20 mg daily at home -- was held on the last admission  Followed by Cardio as out patient  Monitor K+: 3.7 (6/30)     Gastroesophageal reflux disease without esophagitis- (present on admission)  Assessment & Plan  On Prilosec

## 2021-07-01 NOTE — THERAPY
"Occupational Therapy  Daily Treatment     Patient Name: Donna Isaac  Age:  55 y.o., Sex:  female  Medical Record #: [id number]  Today's Date: 7/1/2021     Precautions  Precautions: (P) Fall Risk, Other (See Comments)  Comments: (P) Orthostatic Hypotension, hx of falls,  abdominal binder OOB, R ankle brace, [place] (deaf R ear)    Safety   ADL Safety : Requires Supervision for Safety, Requires Physical Assist for Safety, Impaired Insight into Safety, Impaired  Bathroom Safety: Requires Supervision for Safety, Requires Physical Assist for Safety, Impaired Insight into Safety, Impaired  Comments: see below functional levels for ADL performance details.     Subjective    \"I generally only have a few seconds to react before I pass out... I generally come to around 90 seconds after I have passed out\"     Objective     07/01/21 1101   Functional Level of Assist   Toileting Contact Guard Assist  (balance and impulsivity)   Bed, Chair, Wheelchair Transfer Contact Guard Assist  (Stand pivot transfer bed<>w/c)   Toilet Transfers Contact Guard Assist  (Stand pivot transfer w/c<>toilet with GB- VC for saftey)   Sitting Upper Body Exercises   Upper Extremity Bike Level 5 Resistance  (motomed, BUE- 8:03min, 1.32miles, 1RB)   Interdisciplinary Plan of Care Collaboration   Patient Position at End of Therapy In Bed;Bed Alarm On;Call Light within Reach;Tray Table within Reach;Phone within Reach   OT Total Time Spent   OT Individual Total Time Spent (Mins) 30   OT Charge Group   OT Self Care / ADL 1   OT Therapeutic Exercise  1       Assessment    Pt participated in UB therex to the best of her ability with no complaits of dizziness or pain. During ADLs pt is limited by saftey and implusitivity, needing VC to slow movements when getting up and lock brakes of wc before standing. Edu was provided on saftey in fucntional mibility- sit to stand slower pace, increase awareneess of onset of symptoms, hydration. Pt req further edu as demo " by need for additional VC.     Strengths: Pleasant and cooperative, Willingly participates in therapeutic activities  Barriers: Decreased endurance, Fatigue, Generalized weakness, Hearing impairment, Orthostatic hypotension, Impaired activity tolerance, Impaired balance, Limited mobility    Plan    Edu on slower pacing of sit to stand transfers/functional mobility, general strengthening and endurance    Occupational Therapy Goals (Active)     Problem: Dressing     Dates: Start: 06/29/21       Goal: STG-Within one week, patient will dress LB     Dates: Start: 06/29/21       Goal Note filed on 07/01/21 1139 by Dennis Alcantar MS,OTR/L     Max assist to manage LB clothing - Total assist for SILVERIO hose and R ankle brace, Max assist to don shoes, Min assist to manage briefs               Problem: Functional Transfers     Dates: Start: 06/29/21       Goal: STG-Within one week, patient will transfer to toilet     Dates: Start: 06/29/21       Goal Note filed on 07/01/21 1139 by Dennis Alcantar MS,OTR/L     CGA via DME               Problem: OT Long Term Goals     Dates: Start: 06/29/21       Goal: LTG-By discharge, patient will complete basic self care tasks     Dates: Start: 06/29/21       Goal Note filed on 06/29/21 0823 by Dennis Alcantar MS,OTR/L     1) Individualized Goal:  Mod I for BADL's via AE/DME PRN  2) Interventions:  OT Self Care/ADL, OT Neuro Re-Ed/Balance, OT Therapeutic Activity, OT Evaluation, and OT Therapeutic Exercise            Goal: LTG-By discharge, patient will perform bathroom transfers     Dates: Start: 06/29/21       Goal Note filed on 06/29/21 0823 by Dennis Alcantar MS,OTR/L     1) Individualized Goal:  Mod I for bathroom transfers via DME PRN  2) Interventions: OT Self Care/ADL, OT Neuro Re-Ed/Balance, OT Therapeutic Activity, OT Evaluation, and OT Therapeutic Exercise               Problem: Toileting     Dates: Start: 06/29/21       Goal: STG-Within one week, patient will complete toileting tasks     Dates:  Start: 06/29/21       Goal Note filed on 07/01/21 1139 by Dennis Alcantar MS,OTR/L     CGA/Min assist

## 2021-07-01 NOTE — CARE PLAN
Problem: Mobility  Goal: STG-Within one week, patient will ambulate household distance 50 ft With LRD at CGA in order to progress towards PLOF  Outcome: Met     Problem: Mobility Transfers  Goal: STG-Within one week, patient will transfer bed to chair [facility] with LRD In order to maximize self mobility  Outcome: Not Met  Note: CGA with transfers

## 2021-07-01 NOTE — DISCHARGE PLANNING
Met with patient to update after IDT conference and provide planned DC date of 7/8/21.  Obtained choice for outpatient therapy, DME to be determined closer to DC.  Patient tearful due to pain but ok with plan for DC on 7/8/21.

## 2021-07-01 NOTE — THERAPY
Occupational Therapy  Daily Treatment     Patient Name: Donna Isaac  Age:  55 y.o., Sex:  female  Medical Record #: [id number]  Today's Date: 7/1/2021     Precautions  Precautions: Fall Risk, Other (See Comments)  Comments: Orthostatic Hypotension, hx of falls,  abdominal binder OOB, R ankle brace, [place] (deaf R ear)    Safety   ADL Safety : Requires Supervision for Safety, Requires Physical Assist for Safety, Impaired Insight into Safety, Impaired  Bathroom Safety: Requires Supervision for Safety, Requires Physical Assist for Safety, Impaired Insight into Safety, Impaired  Comments: see below functional levels for ADL performance details.     Subjective       Objective       07/01/21 0831   Precautions   Precautions Fall Risk;Other (See Comments)   Comments Orthostatic Hypotension, hx of falls,  abdominal binder OOB, R ankle brace, [place] (deaf R ear)   Vitals   O2 Delivery Device None - Room Air   Non Verbal Descriptors   Non Verbal Scale  Calm   Cognition    Level of Consciousness Alert   Sleep/Wake Cycle   Sleep & Rest Awake   Functional Level of Assist   Grooming Modified Independent;Seated   Grooming Description Increased time;Seated in wheelchair at sink   Upper Body Dressing Minimal Assist   Upper Body Dressing Description Application of orthotic or brace;Increased time  (Mod I to manage blouse, reqrd assist for abdominal binder)   Lower Body Dressing Maximal Assist   Lower Body Dressing Description Grab bar;Reacher;Application of orthotic or brace;Increased time;Initial preparation for task;Set-up of equipment;Supervision for safety;Verbal cueing  (CGA to manage briefs, Required assist for SILVERIO hose, R ankle )   Toileting Minimal Assist   Toileting Description Grab bar;Increased time;Supervision for safety;Verbal cueing   Toilet Transfers Contact Guard Assist  (wc/commode - VQ's to engage brake before standing)   Toilet Transfer Description Grab bar;Set-up of equipment;Supervision for safety;Verbal cueing    Interdisciplinary Plan of Care Collaboration   IDT Collaboration with  Nursing   Patient Position at End of Therapy Seated;Chair Alarm On;Self Releasing Lap Belt Applied;Call Light within Reach;Tray Table within Reach;Phone within Reach   Collaboration Comments CLOF, transitiuon of care to nursing   OT Total Time Spent   OT Individual Total Time Spent (Mins) 30   OT Charge Group   OT Self Care / ADL 2       Assessment    Pt was alert and cooperative w/ tx.  Tx emphasis on environmental/safety awareness, ADL's and bathroom transfers - see notes above for details.  Strengths: Pleasant and cooperative, Willingly participates in therapeutic activities  Barriers: Decreased endurance, Fatigue, Generalized weakness, Hearing impairment, Orthostatic hypotension, Impaired activity tolerance, Impaired balance, Limited mobility    Plan    ADLs, IADLs, functional mobility, AE education, strength/endurance building, balance.     Passport items to be completed:  Passport items to be completed:  Perform bathroom transfers, complete dressing, complete feeding, get ready for the day, prepare a simple meal, participate in household tasks, adapt home for safety needs, demonstrate home exercise program, complete caregiver training     Occupational Therapy Goals (Active)     Problem: Dressing     Dates: Start: 06/29/21       Goal: STG-Within one week, patient will dress LB     Dates: Start: 06/29/21       Goal Note filed on 06/29/21 0823 by Dennis Alcantar MS,OTR/L     1) Individualized Goal:  Mod assist Lower Body Clothing Management via AE/DME PRN  2) Interventions:  OT Self Care/ADL, OT Neuro Re-Ed/Balance, OT Therapeutic Activity, OT Evaluation, and OT Therapeutic Exercise               Problem: Functional Transfers     Dates: Start: 06/29/21       Goal: STG-Within one week, patient will transfer to toilet     Dates: Start: 06/29/21       Goal Note filed on 06/29/21 0823 by Dennis Alcantar MS,OTR/L     1) Individualized Goal:  Sup/SBA  for commode transfer via DME PRN  2) Interventions:  OT Self Care/ADL, OT Neuro Re-Ed/Balance, OT Therapeutic Activity, OT Evaluation, and OT Therapeutic Exercise               Problem: OT Long Term Goals     Dates: Start: 06/29/21       Goal: LTG-By discharge, patient will complete basic self care tasks     Dates: Start: 06/29/21       Goal Note filed on 06/29/21 0823 by Dennis Alcantar MS,OTR/L     1) Individualized Goal:  Mod I for BADL's via AE/DME PRN  2) Interventions:  OT Self Care/ADL, OT Neuro Re-Ed/Balance, OT Therapeutic Activity, OT Evaluation, and OT Therapeutic Exercise            Goal: LTG-By discharge, patient will perform bathroom transfers     Dates: Start: 06/29/21       Goal Note filed on 06/29/21 0823 by Dennis Alcantar MS,OTR/L     1) Individualized Goal:  Mod I for bathroom transfers via DME PRN  2) Interventions: OT Self Care/ADL, OT Neuro Re-Ed/Balance, OT Therapeutic Activity, OT Evaluation, and OT Therapeutic Exercise               Problem: Toileting     Dates: Start: 06/29/21       Goal: STG-Within one week, patient will complete toileting tasks     Dates: Start: 06/29/21       Goal Note filed on 06/29/21 0823 by Dennis Alcantar MS,OTR/L     1) Individualized Goal:  Sup/SBA for toileting task via DME PRN  2) Interventions:  OT Self Care/ADL, OT Neuro Re-Ed/Balance, OT Therapeutic Activity, OT Evaluation, and OT Therapeutic Exercise

## 2021-07-02 LAB — GLUCOSE BLD-MCNC: 155 MG/DL (ref 65–99)

## 2021-07-02 PROCEDURE — 97530 THERAPEUTIC ACTIVITIES: CPT

## 2021-07-02 PROCEDURE — 82962 GLUCOSE BLOOD TEST: CPT

## 2021-07-02 PROCEDURE — 97535 SELF CARE MNGMENT TRAINING: CPT

## 2021-07-02 PROCEDURE — 97110 THERAPEUTIC EXERCISES: CPT

## 2021-07-02 PROCEDURE — 770010 HCHG ROOM/CARE - REHAB SEMI PRIVAT*

## 2021-07-02 PROCEDURE — A9270 NON-COVERED ITEM OR SERVICE: HCPCS | Performed by: HOSPITALIST

## 2021-07-02 PROCEDURE — 99233 SBSQ HOSP IP/OBS HIGH 50: CPT | Performed by: PHYSICAL MEDICINE & REHABILITATION

## 2021-07-02 PROCEDURE — 700102 HCHG RX REV CODE 250 W/ 637 OVERRIDE(OP): Performed by: PHYSICAL MEDICINE & REHABILITATION

## 2021-07-02 PROCEDURE — 97116 GAIT TRAINING THERAPY: CPT

## 2021-07-02 PROCEDURE — A9270 NON-COVERED ITEM OR SERVICE: HCPCS | Performed by: PHYSICAL MEDICINE & REHABILITATION

## 2021-07-02 PROCEDURE — 700102 HCHG RX REV CODE 250 W/ 637 OVERRIDE(OP): Performed by: HOSPITALIST

## 2021-07-02 PROCEDURE — 700101 HCHG RX REV CODE 250: Performed by: PHYSICAL MEDICINE & REHABILITATION

## 2021-07-02 PROCEDURE — 99231 SBSQ HOSP IP/OBS SF/LOW 25: CPT | Performed by: HOSPITALIST

## 2021-07-02 RX ORDER — AMOXICILLIN 250 MG
2 CAPSULE ORAL 2 TIMES DAILY PRN
Status: DISCONTINUED | OUTPATIENT
Start: 2021-07-02 | End: 2021-07-12 | Stop reason: HOSPADM

## 2021-07-02 RX ADMIN — COLESEVELAM HYDROCHLORIDE 625 MG: 625 TABLET, FILM COATED ORAL at 21:46

## 2021-07-02 RX ADMIN — AMITRIPTYLINE HYDROCHLORIDE 10 MG: 10 TABLET, FILM COATED ORAL at 08:47

## 2021-07-02 RX ADMIN — ACETAMINOPHEN 650 MG: 325 TABLET, FILM COATED ORAL at 14:50

## 2021-07-02 RX ADMIN — GABAPENTIN 400 MG: 400 CAPSULE ORAL at 08:45

## 2021-07-02 RX ADMIN — LIDOCAINE 1 PATCH: 50 PATCH TOPICAL at 08:49

## 2021-07-02 RX ADMIN — CALCITONIN SALMON 200 UNITS: 200 SPRAY, METERED NASAL at 22:02

## 2021-07-02 RX ADMIN — COLESEVELAM HYDROCHLORIDE 625 MG: 625 TABLET, FILM COATED ORAL at 08:45

## 2021-07-02 RX ADMIN — OMEPRAZOLE 20 MG: 20 CAPSULE, DELAYED RELEASE ORAL at 08:46

## 2021-07-02 RX ADMIN — FUROSEMIDE 20 MG: 20 TABLET ORAL at 05:51

## 2021-07-02 RX ADMIN — CARVEDILOL 3.12 MG: 3.12 TABLET, FILM COATED ORAL at 08:46

## 2021-07-02 RX ADMIN — HYDROCORTISONE 20 MG: 10 TABLET ORAL at 08:46

## 2021-07-02 RX ADMIN — LEVOTHYROXINE SODIUM 75 MCG: 75 TABLET ORAL at 05:51

## 2021-07-02 RX ADMIN — APIXABAN 5 MG: 5 TABLET, FILM COATED ORAL at 08:46

## 2021-07-02 RX ADMIN — DULOXETINE 60 MG: 30 CAPSULE, DELAYED RELEASE ORAL at 21:46

## 2021-07-02 RX ADMIN — COLESEVELAM HYDROCHLORIDE 625 MG: 625 TABLET, FILM COATED ORAL at 14:50

## 2021-07-02 RX ADMIN — ACETAMINOPHEN 650 MG: 325 TABLET, FILM COATED ORAL at 05:55

## 2021-07-02 RX ADMIN — POTASSIUM CHLORIDE 10 MEQ: 1500 TABLET, EXTENDED RELEASE ORAL at 08:45

## 2021-07-02 RX ADMIN — LIOTHYRONINE SODIUM 25 MCG: 25 TABLET ORAL at 21:46

## 2021-07-02 RX ADMIN — FLUDROCORTISONE ACETATE 0.1 MG: 0.1 TABLET ORAL at 05:52

## 2021-07-02 RX ADMIN — CHOLECALCIFEROL TAB 25 MCG (1000 UNIT) 2000 UNITS: 25 TAB at 08:46

## 2021-07-02 RX ADMIN — ACETAMINOPHEN 650 MG: 325 TABLET, FILM COATED ORAL at 22:01

## 2021-07-02 RX ADMIN — APIXABAN 5 MG: 5 TABLET, FILM COATED ORAL at 21:46

## 2021-07-02 RX ADMIN — MONTELUKAST 10 MG: 10 TABLET, FILM COATED ORAL at 21:46

## 2021-07-02 RX ADMIN — HYDROCORTISONE 10 MG: 10 TABLET ORAL at 14:50

## 2021-07-02 RX ADMIN — GABAPENTIN 400 MG: 400 CAPSULE ORAL at 21:46

## 2021-07-02 ASSESSMENT — ENCOUNTER SYMPTOMS
FEVER: 0
NAUSEA: 0
ABDOMINAL PAIN: 0
DIARRHEA: 0
VOMITING: 0
CHILLS: 0
SHORTNESS OF BREATH: 0
NERVOUS/ANXIOUS: 0

## 2021-07-02 ASSESSMENT — GAIT ASSESSMENTS
DEVIATION: TRENDELENBERG;BRADYKINETIC;DECREASED TOE OFF
GAIT LEVEL OF ASSIST: CONTACT GUARD ASSIST
ASSISTIVE DEVICE: OTHER (COMMENTS)

## 2021-07-02 ASSESSMENT — ACTIVITIES OF DAILY LIVING (ADL)
BED_CHAIR_WHEELCHAIR_TRANSFER_DESCRIPTION: INCREASED TIME;SET-UP OF EQUIPMENT;SUPERVISION FOR SAFETY;VERBAL CUEING
TOILETING_LEVEL_OF_ASSIST_DESCRIPTION: GRAB BAR;INCREASED TIME
TOILET_TRANSFER_DESCRIPTION: GRAB BAR;INCREASED TIME;SET-UP OF EQUIPMENT;SUPERVISION FOR SAFETY;VERBAL CUEING
BED_CHAIR_WHEELCHAIR_TRANSFER_DESCRIPTION: ADAPTIVE EQUIPMENT;INCREASED TIME;SET-UP OF EQUIPMENT;SUPERVISION FOR SAFETY;VERBAL CUEING

## 2021-07-02 ASSESSMENT — PAIN DESCRIPTION - PAIN TYPE: TYPE: ACUTE PAIN

## 2021-07-02 NOTE — CARE PLAN
The patient is Stable - Low risk of patient condition declining or worsening      Problem: Pain - Standard  Goal: Alleviation of pain or a reduction in pain to the patient’s comfort goal  Outcome: Progressing  Note: Pt denies pain or discomfort tonight. Sleeps good. Will continue to monitor.     Problem: Fall Risk - Rehab  Goal: Patient will remain free from falls  Outcome: Progressing  Note: Pt uses call light  appropriately. Waits for assistance and does not attempt self transfer this shift. Able to verbalize needs.

## 2021-07-02 NOTE — PROGRESS NOTES
"Rehab Progress Note     Encounter Date: 7/2/2021    CC: Orthostatic hypotension, syncope    Interval Events (Subjective)  Patient sitting up in room. She reports she is doing well. No recent low SBP or feeling like she might pass out. She reports therapy is going well. Discussed with hospitalist and restarted on lasix as legs becoming swollen. She is on lasix at home.     Rapid response: 1330 in gym. With low SBP working on stairs, near syncopal. Able to transfer back to chair and .  Discussed with hospitalist and back to bed. She would like to hold on therapy for now. VS normal. Neurologically intact    IDT Team Meeting 7/1/2021  DC/Disposition:  7/8/21    Objective:  VITAL SIGNS: /76   Pulse (!) 57   Temp 36.6 °C (97.9 °F) (Oral)   Resp 16   Ht 1.626 m (5' 4\")   Wt 100 kg (220 lb 7.4 oz)   LMP  (LMP Unknown)   SpO2 99%   BMI 37.84 kg/m²    Gen: NAD  Psych: Mood and affect appropriate  CV: RRR, 1+ edema BLE  Resp: CTAB, no upper airway sounds  Abd: NTND  Neuro: AOx4, following commands    Recent Results (from the past 72 hour(s))   Comp Metabolic Panel    Collection Time: 06/30/21  5:26 AM   Result Value Ref Range    Sodium 141 135 - 145 mmol/L    Potassium 3.7 3.6 - 5.5 mmol/L    Chloride 110 96 - 112 mmol/L    Co2 22 20 - 33 mmol/L    Anion Gap 9.0 7.0 - 16.0    Glucose 100 (H) 65 - 99 mg/dL    Bun 16 8 - 22 mg/dL    Creatinine 0.61 0.50 - 1.40 mg/dL    Calcium 7.9 (L) 8.5 - 10.5 mg/dL    AST(SGOT) 103 (H) 12 - 45 U/L    ALT(SGPT) 169 (H) 2 - 50 U/L    Alkaline Phosphatase 215 (H) 30 - 99 U/L    Total Bilirubin 1.8 (H) 0.1 - 1.5 mg/dL    Albumin 2.0 (L) 3.2 - 4.9 g/dL    Total Protein 4.1 (L) 6.0 - 8.2 g/dL    Globulin 2.1 1.9 - 3.5 g/dL    A-G Ratio 1.0 g/dL   MAGNESIUM    Collection Time: 06/30/21  5:26 AM   Result Value Ref Range    Magnesium 1.7 1.5 - 2.5 mg/dL   PHOSPHORUS    Collection Time: 06/30/21  5:26 AM   Result Value Ref Range    Phosphorus 3.6 2.5 - 4.5 mg/dL   IRON/TOTAL IRON " BIND    Collection Time: 06/30/21  5:26 AM   Result Value Ref Range    Iron 136 40 - 170 ug/dL    Total Iron Binding 153 (L) 250 - 450 ug/dL    Unsat Iron Binding <17 (L) 110 - 370 ug/dL    % Saturation 89 (H) 15 - 55 %   FERRITIN    Collection Time: 06/30/21  5:26 AM   Result Value Ref Range    Ferritin 94.1 10.0 - 291.0 ng/mL   HEPATITIS PANEL ACUTE(4 COMPONENTS)    Collection Time: 06/30/21  5:26 AM   Result Value Ref Range    Hepatitis B Surface Antigen Non-Reactive Non-Reactive    Hepatitis B Cors Ab,IgM Non-Reactive Non-Reactive    Hepatitis A Virus Ab, IgM Non-Reactive Non-Reactive    Hepatitis C Antibody Non-Reactive Non-Reactive   proBrain Natriuretic Peptide, NT    Collection Time: 06/30/21  5:26 AM   Result Value Ref Range    NT-proBNP 883 (H) 0 - 125 pg/mL   ESTIMATED GFR    Collection Time: 06/30/21  5:26 AM   Result Value Ref Range    GFR If African American >60 >60 mL/min/1.73 m 2    GFR If Non African American >60 >60 mL/min/1.73 m 2   POCT glucose device results    Collection Time: 07/02/21  1:47 PM   Result Value Ref Range    Glucose - Accu-Ck 155 (H) 65 - 99 mg/dL       Current Facility-Administered Medications   Medication Frequency   • senna-docusate (PERICOLACE or SENOKOT S) 8.6-50 MG per tablet 2 tablet BID PRN   • furosemide (LASIX) tablet 20 mg Q DAY   • potassium chloride SA (Kdur) tablet 10 mEq DAILY   • [START ON 7/4/2021] Eravacycline Dihydrochloride (XERAVA) 100 mg in  mL IVPB Q12HRS   • polyethylene glycol/lytes (MIRALAX) PACKET 1 Packet QDAY PRN   • magnesium hydroxide (MILK OF MAGNESIA) suspension 30 mL QDAY PRN   • bisacodyl (DULCOLAX) suppository 10 mg QDAY PRN   • ondansetron (ZOFRAN ODT) dispertab 4 mg 4X/DAY PRN   • ondansetron (ZOFRAN) syringe/vial injection 4 mg 4X/DAY PRN   • fludrocortisone (FLORINEF) tablet 0.1 mg QAM   • hydrocortisone (CORTEF) tablet 10 mg DAILY   • hydrocortisone (CORTEF) tablet 20 mg DAILY   • vitamin D (cholecalciferol) tablet 2,000 Units DAILY    • tizanidine (ZANAFLEX) tablet 4 mg QDAY PRN   • SUMAtriptan (IMITREX) tablet 50 mg Q2HRS PRN   • omeprazole (PRILOSEC) capsule 20 mg DAILY   • montelukast (SINGULAIR) tablet 10 mg Q EVENING   • liothyronine (CYTOMEL) tablet 25 mcg QHS   • levothyroxine (SYNTHROID) tablet 75 mcg AM ES   • gabapentin (NEURONTIN) capsule 400 mg BID   • Eravacycline Dihydrochloride 100 mg BID   • DULoxetine (CYMBALTA) capsule 60 mg QHS   • colesevelam (WELCHOL) tablet 625 mg TID   • carvedilol (COREG) tablet 3.125 mg BID WITH MEALS   • calcitonin (salmon) (MIACALCIN) nasal spray 200 Units QHS   • apixaban (ELIQUIS) tablet 5 mg BID   • amitriptyline (ELAVIL) tablet 10 mg DAILY   • sodium chloride (OCEAN) 0.65 % nasal spray 2 Spray PRN   • traZODone (DESYREL) tablet 50 mg QHS PRN   • mag hydrox-al hydrox-simeth (MAALOX PLUS ES or MYLANTA DS) suspension 20 mL Q2HRS PRN   • benzocaine-menthol (CEPACOL) lozenge 1 Lozenge Q2HRS PRN   • artificial tears ophthalmic solution 1 Drop PRN   • acetaminophen (Tylenol) tablet 650 mg Q4HRS PRN   • hydrALAZINE (APRESOLINE) tablet 25 mg Q8HRS PRN   • lidocaine (LIDODERM) 5 % 1 Patch DAILY       Orders Placed This Encounter   Procedures   • Diet Order Diet: Regular     Standing Status:   Standing     Number of Occurrences:   1     Order Specific Question:   Diet:     Answer:   Regular [1]       Assessment:  Active Hospital Problems    Diagnosis    • Syncope    • Transaminitis    • High potassium    • History of pulmonary embolism    • Adrenal insufficiency ([place]at[place]'s disease) (HCC)    • Hypertension    • Tachycardia    • Orthostatic hypotension    • Hypothyroidism    • Chronic systolic heart failure (HCC)    • Gastroesophageal reflux disease without esophagitis    • Depression        Medical Decision Making and Plan:  Orthostatic hypotension - Probably a combination of volume depletion and underlying [place]'s disease, was started on Florinef at [facility] as well as SILVERIO hoses and abdominal binder  -PT and  OT for mobility and ADLs  -Continue SILVERIO hoses and abdominal binder  -On Florinef 0.1 mg daily. Has CHF so needs to be on ARB and BB at lowest dose. Consider midodrine.   -Consult hospitalist. Syncope on day of evaluation while in shower 6/29     [place]'s disease - Patient on Hydrocortisone 10 mg BID. Now on Florinef. Will monitor electrolytes.   -Consult Hospitalist, considering increase in Hydrocortisone level. Currently 20 mg qAM and 10 mg q 1PM. Also changed Florinef to earlier morning dose  -Patient to bring in home medications prescribed by Endocrinologist      CHF - Patient on Coreg 3.125 mg and Losartan 25 mg daily     Neuropathy - Patient on Gabapentin 400 mg BID     HLD - Patient on welchol.      CKD - Avoid nephrotoxic agents. Check AM CMP 0.61, stable     Anemia - Check AM CBC - resolved     Mood/Depression/Pain - Patient is on Cymbalta 60 mg and Amitriptyline 10 mg daily.      Elevated LFTs - Elevated on admission into 200s. Will monitor, was previously downtrending.   -Into 100s on repeat, will monitor    Asthma/COPD - Patient on Singulair on transfer.      Hypothyroidism - Patient on Levothyroxine 75 mcg and Cytomel 25 mcg QHS. Low TSH on admission, normal T4    MRSA sinusitis - Prolonged treatment, with multiple allergies to antibiotics. On Eravacycline 100 mg BID. Working with pharmacy on prescription of medication     Morbid Obesity due to excess calories - BMI of 37.84 on admission, meets medical criteria. Dietitian to follow     GERD -Patient on omeprazole on transfer.      Hx of PE - Patient on Eliquis    Total time:  36 minutes.  I spent greater than 50% of the time for patient care, counseling, and coordination on this date, including unit/floor time, and face-to-face time with the patient as per interval events and assessment and plan above. Topics discussed included BP control, Lasix restarted, Rapid response in gym, and medical hold for syncopal event.     Darion Metzger,  M.D.

## 2021-07-02 NOTE — PROGRESS NOTES
[facility] Medicine Daily Progress Note    Date of Service  7/2/2021    Chief Complaint:  Syncope  Orthostatic hypotension   AI  Transaminitis    Interval History:  No significant events or changes since last visit    Review of Systems  Review of Systems   Constitutional: Negative for chills and fever.   Respiratory: Negative for shortness of breath.    Cardiovascular: Positive for leg swelling. Negative for chest pain.   Gastrointestinal: Negative for abdominal pain, diarrhea, nausea and vomiting.   Psychiatric/Behavioral: The patient is not nervous/anxious.         Physical Exam  Temp:  [36 °C (96.8 °F)-37.1 °C (98.8 °F)] 37.1 °C (98.8 °F)  Pulse:  [63-86] 63  Resp:  [16-18] 16  BP: (111-143)/(78-86) 111/78  SpO2:  [95 %-98 %] 95 %    Physical Exam  Vitals and nursing note reviewed.   Constitutional:       Appearance: Normal appearance.   HENT:      Head: Atraumatic.   Eyes:      Conjunctiva/sclera: Conjunctivae normal.      Pupils: Pupils are equal, round, and reactive to light.   Neck:      Vascular: No JVD.   Cardiovascular:      Rate and Rhythm: Normal rate and regular rhythm.      Heart sounds: Normal heart sounds.   Pulmonary:      Effort: Pulmonary effort is normal.      Breath sounds: Normal breath sounds.   Abdominal:      General: Bowel sounds are normal.      Palpations: Abdomen is soft.   Musculoskeletal:      Cervical back: Normal range of motion and neck supple.      Right lower leg: Edema present.      Left lower leg: Edema present.      Comments: Edema L>R   Skin:     General: Skin is warm and dry.   Neurological:      Mental Status: She is alert and oriented to person, place, and time.   Psychiatric:         Mood and Affect: Mood normal.         Behavior: Behavior normal.         Fluids    Intake/Output Summary (Last 24 hours) at 7/2/2021 1005  Last data filed at 7/2/2021 0556  Gross per 24 hour   Intake 480 ml   Output --   Net 480 ml       Laboratory      Recent Labs     06/30/21  0526   SODIUM  141   POTASSIUM 3.7   CHLORIDE 110   CO2 22   GLUCOSE 100*   BUN 16   CREATININE 0.61   CALCIUM 7.9*                   Imaging    Assessment/Plan  Transaminitis  Assessment & Plan  Has an on & off hx -- rises up and then seems to self correct  LFT's elevated (6/29) --> now decreasing (6/30)  Hep profile wnl  Fe 136, sats 89%  US liver: heterogeneous and echogenic appearance of the liver can be seen in hepatic steatosis or hepatocellular disease.                mild prominence of the common duct likely represents ectasia in the setting of prior cholecystectomy.                aorta, IVC and pancreas are obscured by overlying bowel gas, limiting evaluation.  ? 2nd to meds  Note: has hx of hemachromatosis  Monitor    Syncope  Assessment & Plan  Has hx  Started about 5 years ago in [place] and then seemed to resolve  Pt states that over the last few months it has gotten worse  Has recent hx of mult hosp adms for syncope  Had syncope on am 6/29 while sitting in the shower and hit head -- CT head in ER ok  ? 2nd to AI and Orthostatic hypotension  Has abn TFT's -- see other assessment  Has recent PE and is on Eliquis  BNP: 176 (5/10) --> 20,599 (5/14) --> 2165 (5/25) --> 883 (6/30)  Echo (5/10): EF > 70%  TropI: has multiple studies recently -- unremarkable  TFT's -- see other assessment  Off Cozaar (was on for a few months) -- has SE of orthostatic hypotension  On Cortef: 20 mg qam and 10 mg afternoon  On Florinef  Note: to f/u with her Neurologist for autonomic disorder  Monitor    History of pulmonary embolism- (present on admission)  Assessment & Plan  Suspected 2nd to the Covid vaccine  May also be 2nd to immobility at home (but had no DVT)  On Eliquis    Adrenal insufficiency ([place]lier's disease) (HCC)- (present on admission)  Assessment & Plan  Has seen Endocrine and suspects has AI  Has hx of occ low BP, orthostatic hypotension and syncope  Has abn TFT's -- see other assessment  Recently pt has been on several  different doses of Cortef  On Cortef: 20 mg qam and 10 mg afternoon   On Florinef qam  D/W pt's Endocrinologist: she should also be on a growth hormone -- was on Zomacton before                                            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 Canales [phone]  D/W patient: will have  bring in home med Zomacton    Hypertension- (present on admission)  Assessment & Plan  BP ok  Has orthostatic hypotension  Off Cozaar --> 2nd to SE of orthostatics  On Coreg  Note: now on Lasix (7/1)  Cont to monitor    Tachycardia- (present on admission)  Assessment & Plan  HR recently ok  HR was occ rising up for no apparent reason and then self corrects   ? POTS (also has orthostatic hypotension)  On Coreg  Pt drinking enough water  Sodium levels ok  Note: now on Lasix (7/1)  Monitor    Orthostatic hypotension- (present on admission)  Assessment & Plan  Has hx  Has abn TFT's -- see other assessment  Off Cozaar (has ortho SE)  On Cortef: 20 mg qam and 10 mg afternoon  On Florinef qam  Monitor    Hypothyroidism- (present on admission)  Assessment & Plan  TSH low at 0.02  FT4 ok at 1.27  Past TFT's has been quite variable  On Synthroid  On Cytomel  D/W pt's Endocrinologist -- these variations are due to the pt's complex medical situation    Depression- (present on admission)  Assessment & Plan  On Cymbalta    Chronic systolic heart failure (HCC)- (present on admission)  Assessment & Plan  Compensated  Has had some increasing BLE edema recently  Echo (5/10): EF > 70%  BNP: 176 (5/10) --> 20,599 (5/14) --> 2,165 (5/25) --> 883 (6/30)  Pt feels like her legs are becoming more swollen recently  Off Cozaar -- 2nd to possible orthostatic SE  On Coreg  On Lasix (7/1)  On K+ supplements  Note: was previously on Lasix 20 mg daily at home -- was held on the last admission  Followed by Cardio as out patient  Monitor K+: 3.7 (6/30)     Gastroesophageal reflux disease without  esophagitis- (present on admission)  Assessment & Plan  On Prilosec

## 2021-07-02 NOTE — THERAPY
Missed Therapy     Patient Name: Donna Isaac  Age:  55 y.o., Sex:  female  Medical Record #: [id number]  Today's Date: 7/2/2021    Discussed missed therapy with nursing, PT, Dr. Metzger. Pt had syncopal episode during PT session earlier this afternoon. Per nursing pt not able to participate in OOB activity. Pt reports feeling very fatigued and unable to participate at this time.        07/02/21 1501   Interdisciplinary Plan of Care Collaboration   IDT Collaboration with  Nursing;Physical Therapist;Physician   Patient Position at End of Therapy In Bed;Call Light within Reach;Tray Table within Reach;Phone within Reach   Collaboration Comments CLOF, POC, RR during PT session   Therapy Missed   Missed Therapy (Minutes) 60   Reason For Missed Therapy Medical - Patient on Hold from Therapy;Medical - Patient not Able To Participate  (pt with syncopal episode during PT; per RN no OOB ax)

## 2021-07-02 NOTE — THERAPY
Physical Therapy   Daily Treatment     Patient Name: Donna Isaac  Age:  55 y.o., Sex:  female  Medical Record #: [id number]  Today's Date: 7/1/2021     Precautions  Precautions: Fall Risk, Other (See Comments)  Comments: Orthostatic Hypotension, hx of falls,  abdominal binder OOB, R ankle brace, [place] (deaf R ear)    Subjective    Pt reports she is still sore from her fall in the shower the other day      Objective       07/01/21 0931   Gait Functional Level of Assist    Gait Level Of Assist Contact Guard Assist   Assistive Device None  (outside over uneven surfaces, wc follow)   Distance (Feet) 30  (also 10 -25 ft bouts)   # of Times Distance was Traveled 6   Deviation Trendelenberg;Bradykinetic   Transfer Functional Level of Assist   Bed, Chair, Wheelchair Transfer Contact Guard Assist   Bed Chair Wheelchair Transfer Description Adaptive equipment;Verbal cueing   Sitting Lower Body Exercises   Nustep Resistance Level 4  (15 min, VCs for pacing)   Standing Lower Body Exercises   Comments Standing exercise HEP issued for post DC work at home with BUE support and SPV with chair behind   Bed Mobility    Supine to Sit Supervised   Sit to Supine Supervised   Sit to Stand Contact Guard Assist   Scooting Supervised   Rolling Supervised   Interdisciplinary Plan of Care Collaboration   Patient Position at End of Therapy In Bed;Call Light within Reach;Tray Table within Reach   PT Total Time Spent   PT Individual Total Time Spent (Mins) 90   PT Charge Group   PT Gait Training 2   PT Therapeutic Exercise 2   PT Therapeutic Activities 2       Assessment    Pt requires extra rest time nad frequent breaks this session dt pain and soreness in R hip and ankle from fall 2 days ago as well as from occasional dizziness. Pt navigates uneven surfaces well and performs exercise well this session with cues. Cont to demo improved safety with transfers with decreasing level of assist.     Strengths: Able to follow instructions,  Motivated for self care and independence, Independent prior level of function, Good insight into deficits/needs, Willingly participates in therapeutic activities, Pleasant and cooperative  Barriers: Decreased endurance, Generalized weakness, Orthostatic hypotension, Impaired balance, Limited mobility    Plan    Ambulation, Ther ex for strengthening and endurance, Orientation to rehab facility, Standing tolerance and standing balance, Education     Passport items to be completed:  Passport items to be completed:  Get in/out of bed safely, in/out of a vehicle, safely use mobility device, walk or wheel around home/community, navigate up and down stairs, show how to get up/down from the ground, ensure home is accessible, demonstrate HEP, complete caregiver training    Physical Therapy Problems (Active)     Problem: Mobility     Dates: Start: 07/01/21       Goal: STG-Within one week, patient will ambulate household distance of 150 feet with gait belt and SBA.     Dates: Start: 07/01/21          Goal: STG-Within one week, patient will ambulate up/down flight of stairs with B railings, gait belt, and CGA.     Dates: Start: 07/01/21             Problem: Mobility Transfers     Dates: Start: 06/29/21       Goal: STG-Within one week, patient will transfer bed to chair At [facility] with LRD In order to maximize self mobility     Dates: Start: 06/29/21       Goal Note filed on 07/01/21 1131 by Luis Morales, PT     CGA with transfers               Problem: PT-Long Term Goals     Dates: Start: 06/29/21       Goal: LTG-By discharge, patient will ambulate 150 ft With LRD at mod I in order to progress towards PLOF      Dates: Start: 06/29/21          Goal: LTG-By discharge, patient will transfer one surface to another At mod I with LRD In order to maximize self mobility     Dates: Start: 06/29/21          Goal: LTG-By discharge, patient will ambulate up/down flight of stairs With single HR at [facility] in order to enter/exit home safely      Dates: Start: 06/29/21

## 2021-07-02 NOTE — THERAPY
"Occupational Therapy  Daily Treatment     Patient Name: Donna Isaac  Age:  55 y.o., Sex:  female  Medical Record #: [id number]  Today's Date: 7/2/2021     Precautions  Precautions: Fall Risk, Other (See Comments)  Comments: Orthostatic Hypotension, hx of falls,  abdominal binder OOB, R ankle brace, [place] (deaf R ear)    Safety   ADL Safety : Requires Supervision for Safety  Bathroom Safety: Requires Supervision for Safety, Requires Physical Assist for Safety, Impaired Insight into Safety, Impaired  Comments: see below functional levels for ADL performance details.     Subjective    \"We are living in a 2 story house now with friends until the purchase of our new house is finalized. I can get up the first 8 steps okay, but then there's a landing and more steps. I typically need help with those other steps and if I don't get up them quick enough I end up on the floor and I need 3 people to help me up.\" OT recommending sturdy chair on landing for rest break with  present. Per pt, landing is about the size of a queen bed.     Objective       07/02/21 1231   Safety    ADL Safety  Requires Supervision for Safety   Cognition    Level of Consciousness Alert   Functional Level of Assist   Grooming Contact Guard Assist;Standing   Toileting Stand by Assist   Toileting Description Grab bar;Increased time  (SBA 2/2 h/o syncope)   Bed, Chair, Wheelchair Transfer Contact Guard Assist  (CGA 2/2 h/o syncope; standard bed)   Toilet Transfers Contact Guard Assist   Toilet Transfer Description   (CGA 2/2 h/o syncope)   Tub / Shower Transfers Contact Guard Assist  (Simulated home setup. Walk in w/ lip FWW)   IADL Treatments   IADL Treatments Kitchen mobility education   Kitchen Mobility Education CGA w/FWW to retrieve cup, fill with water, empty, practice getting items in and out of the fridge. No LOB. CGA 2/2 h/o syncope otherwise wouldve been SBA   Interdisciplinary Plan of Care Collaboration   IDT Collaboration with  " Physical Therapist   Patient Position at End of Therapy In Bed;Call Light within Reach;Tray Table within Reach;Phone within Reach   Collaboration Comments re: pt episode of syncope later during PT session   OT Total Time Spent   OT Individual Total Time Spent (Mins) 30   OT Charge Group   OT Self Care / ADL 1   OT Therapy Activity 1       Assessment    Pt with significant impairments to endurance. Pt had no syncopal episodes during this session, however, did with PT later this afternoon. Pt took a shower between OT and PT. Per EMR, pt's last syncopal event was in the shower. Pt did not need the bed rail for standard bed transfer.     Strengths: Pleasant and cooperative, Willingly participates in therapeutic activities  Barriers: Decreased endurance, Fatigue, Generalized weakness, Hearing impairment, Orthostatic hypotension, Impaired activity tolerance, Impaired balance, Limited mobility    Plan    Cooking and/or laundry task with combination of standing and sitting, ADLs, IADLs, functional mobility, AE education, strength/endurance building, balance.      Passport items to be completed:  Perform bathroom transfers, complete dressing, complete feeding, get ready for the day, prepare a simple meal, participate in household tasks, adapt home for safety needs, demonstrate home exercise program, complete caregiver training        Occupational Therapy Goals (Active)     Problem: Dressing     Dates: Start: 06/29/21       Goal: STG-Within one week, patient will dress LB     Dates: Start: 06/29/21       Goal Note filed on 07/01/21 1139 by Dennis Alcantar MS,OTR/L     Max assist to manage LB clothing - Total assist for SILVERIO hose and R ankle brace, Max assist to don shoes, Min assist to manage briefs               Problem: Functional Transfers     Dates: Start: 06/29/21       Goal: STG-Within one week, patient will transfer to toilet     Dates: Start: 06/29/21       Goal Note filed on 07/01/21 1139 by Dennis Alcantar MS,OTR/L     CGA  via DME               Problem: OT Long Term Goals     Dates: Start: 06/29/21       Goal: LTG-By discharge, patient will complete basic self care tasks     Dates: Start: 06/29/21       Goal Note filed on 06/29/21 0823 by Dennis Alcantar MS,OTR/L     1) Individualized Goal:  Mod I for BADL's via AE/DME PRN  2) Interventions:  OT Self Care/ADL, OT Neuro Re-Ed/Balance, OT Therapeutic Activity, OT Evaluation, and OT Therapeutic Exercise            Goal: LTG-By discharge, patient will perform bathroom transfers     Dates: Start: 06/29/21       Goal Note filed on 06/29/21 0823 by Dennis Alcantar MS,OTR/L     1) Individualized Goal:  Mod I for bathroom transfers via DME PRN  2) Interventions: OT Self Care/ADL, OT Neuro Re-Ed/Balance, OT Therapeutic Activity, OT Evaluation, and OT Therapeutic Exercise               Problem: Toileting     Dates: Start: 06/29/21       Goal: STG-Within one week, patient will complete toileting tasks     Dates: Start: 06/29/21       Goal Note filed on 07/01/21 1139 by Dennis Alcantar MS,OTR/L     CGA/Min assist

## 2021-07-02 NOTE — THERAPY
"Physical Therapy   Daily Treatment     Patient Name: Donna Isaac  Age:  55 y.o., Sex:  female  Medical Record #: [id number]  Today's Date: 7/2/2021     Precautions  Precautions: Fall Risk, Other (See Comments)  Comments: Orthostatic Hypotension, hx of falls,  abdominal binder OOB, R ankle brace, [place] (deaf R ear)    Subjective    Patient agreeable to PT; reports decreasing BLE edema but mildly increased \"black and blue\" color.     Objective       07/02/21 1031   Vitals   O2 (LPM) 0   O2 Delivery Device None - Room Air   Gait Functional Level of Assist    Gait Level Of Assist Contact Guard Assist   Assistive Device Other (Comments)  (Gait belt)   Distance (Feet)   (varies from 15 ft to 70 ft)   # of Times Distance was Traveled 5   Deviation Trendelenberg;Bradykinetic;Decreased Toe Off   Transfer Functional Level of Assist   Bed, Chair, Wheelchair Transfer Contact Guard Assist   Bed Chair Wheelchair Transfer Description Adaptive equipment;Increased time;Set-up of equipment;Supervision for safety;Verbal cueing   Bed Mobility    Supine to Sit Supervised   Sit to Supine Supervised   Sit to Stand Contact Guard Assist  (gait belt)   Scooting Supervised   Rolling Supervised   Interdisciplinary Plan of Care Collaboration   IDT Collaboration with  Nursing   Patient Position at End of Therapy In Bed;Call Light within Reach;Tray Table within Reach;Phone within Reach   Collaboration Comments removal of IV when completed   PT Total Time Spent   PT Individual Total Time Spent (Mins) 30   PT Charge Group   PT Gait Training 2       Assessment    Patient has improving activity tolerance but endurance occasionally limited by onset of mild dizziness or pre-syncope symptoms per pt report; good motivation; Trendelenburg present during ambulation.    Strengths: Able to follow instructions, Motivated for self care and independence, Independent prior level of function, Good insight into deficits/needs, Willingly participates in " therapeutic activities, Pleasant and cooperative  Barriers: Decreased endurance, Generalized weakness, Orthostatic hypotension, Impaired balance, Limited mobility    Plan    Ambulation, Ther ex for strengthening and endurance, Orientation to rehab facility, Standing tolerance and standing balance, Education    Passport items to be completed:  Passport items to be completed:  Get in/out of bed safely, in/out of a vehicle, safely use mobility device, walk or wheel around home/community, navigate up and down stairs, show how to get up/down from the ground, ensure home is accessible, demonstrate HEP, complete caregiver training    Physical Therapy Problems (Active)     Problem: Mobility     Dates: Start: 07/01/21       Goal: STG-Within one week, patient will ambulate household distance of 150 feet with gait belt and SBA.     Dates: Start: 07/01/21          Goal: STG-Within one week, patient will ambulate up/down flight of stairs with B railings, gait belt, and CGA.     Dates: Start: 07/01/21             Problem: Mobility Transfers     Dates: Start: 06/29/21       Goal: STG-Within one week, patient will transfer bed to chair At \[facility]\"" with LRD In order to maximize self mobility     Dates: Start: 06/29/21       Goal Note filed on 07/01/21 1131 by Luis Morales, PT     CGA with transfers               Problem: PT-Long Term Goals     Dates: Start: 06/29/21       Goal: LTG-By discharge, patient will ambulate 150 ft With LRD at mod I in order to progress towards PLOF      Dates: Start: 06/29/21          Goal: LTG-By discharge, patient will transfer one surface to another At mod I with LRD In order to maximize self mobility     Dates: Start: 06/29/21          Goal: LTG-By discharge, patient will ambulate up/down flight of stairs With single HR at \[facility]\"" in order to enter/exit home safely     Dates: Start: 06/29/21

## 2021-07-03 LAB
ALBUMIN SERPL BCP-MCNC: 2.1 G/DL (ref 3.2–4.9)
ALBUMIN/GLOB SERPL: 1.1 G/DL
ALP SERPL-CCNC: 289 U/L (ref 30–99)
ALT SERPL-CCNC: 142 U/L (ref 2–50)
ANION GAP SERPL CALC-SCNC: 14 MMOL/L (ref 7–16)
AST SERPL-CCNC: 103 U/L (ref 12–45)
BILIRUB SERPL-MCNC: 3.2 MG/DL (ref 0.1–1.5)
BUN SERPL-MCNC: 12 MG/DL (ref 8–22)
CALCIUM SERPL-MCNC: 7.6 MG/DL (ref 8.5–10.5)
CHLORIDE SERPL-SCNC: 102 MMOL/L (ref 96–112)
CO2 SERPL-SCNC: 22 MMOL/L (ref 20–33)
CREAT SERPL-MCNC: 0.68 MG/DL (ref 0.5–1.4)
GLOBULIN SER CALC-MCNC: 1.9 G/DL (ref 1.9–3.5)
GLUCOSE SERPL-MCNC: 94 MG/DL (ref 65–99)
MAGNESIUM SERPL-MCNC: 1.3 MG/DL (ref 1.5–2.5)
NT-PROBNP SERPL IA-MCNC: 823 PG/ML (ref 0–125)
PHOSPHATE SERPL-MCNC: 2.7 MG/DL (ref 2.5–4.5)
POTASSIUM SERPL-SCNC: 3.1 MMOL/L (ref 3.6–5.5)
PROT SERPL-MCNC: 4 G/DL (ref 6–8.2)
SODIUM SERPL-SCNC: 138 MMOL/L (ref 135–145)

## 2021-07-03 PROCEDURE — 700102 HCHG RX REV CODE 250 W/ 637 OVERRIDE(OP): Performed by: PHYSICAL MEDICINE & REHABILITATION

## 2021-07-03 PROCEDURE — 84100 ASSAY OF PHOSPHORUS: CPT

## 2021-07-03 PROCEDURE — 83735 ASSAY OF MAGNESIUM: CPT

## 2021-07-03 PROCEDURE — 700101 HCHG RX REV CODE 250: Performed by: PHYSICAL MEDICINE & REHABILITATION

## 2021-07-03 PROCEDURE — A9270 NON-COVERED ITEM OR SERVICE: HCPCS | Performed by: PHYSICAL MEDICINE & REHABILITATION

## 2021-07-03 PROCEDURE — 80053 COMPREHEN METABOLIC PANEL: CPT

## 2021-07-03 PROCEDURE — 83880 ASSAY OF NATRIURETIC PEPTIDE: CPT

## 2021-07-03 PROCEDURE — 97110 THERAPEUTIC EXERCISES: CPT

## 2021-07-03 PROCEDURE — 97535 SELF CARE MNGMENT TRAINING: CPT

## 2021-07-03 PROCEDURE — 99231 SBSQ HOSP IP/OBS SF/LOW 25: CPT | Performed by: HOSPITALIST

## 2021-07-03 PROCEDURE — 97116 GAIT TRAINING THERAPY: CPT

## 2021-07-03 PROCEDURE — 770010 HCHG ROOM/CARE - REHAB SEMI PRIVAT*

## 2021-07-03 PROCEDURE — A9270 NON-COVERED ITEM OR SERVICE: HCPCS | Performed by: HOSPITALIST

## 2021-07-03 PROCEDURE — 97530 THERAPEUTIC ACTIVITIES: CPT

## 2021-07-03 PROCEDURE — 700102 HCHG RX REV CODE 250 W/ 637 OVERRIDE(OP): Performed by: HOSPITALIST

## 2021-07-03 RX ORDER — POTASSIUM CHLORIDE 20 MEQ/1
40 TABLET, EXTENDED RELEASE ORAL ONCE
Status: COMPLETED | OUTPATIENT
Start: 2021-07-03 | End: 2021-07-03

## 2021-07-03 RX ORDER — FUROSEMIDE 20 MG/1
20 TABLET ORAL ONCE
Status: COMPLETED | OUTPATIENT
Start: 2021-07-03 | End: 2021-07-03

## 2021-07-03 RX ORDER — POTASSIUM CHLORIDE 20 MEQ/1
40 TABLET, EXTENDED RELEASE ORAL DAILY
Status: DISCONTINUED | OUTPATIENT
Start: 2021-07-04 | End: 2021-07-12 | Stop reason: HOSPADM

## 2021-07-03 RX ORDER — FUROSEMIDE 40 MG/1
40 TABLET ORAL
Status: DISCONTINUED | OUTPATIENT
Start: 2021-07-04 | End: 2021-07-12 | Stop reason: HOSPADM

## 2021-07-03 RX ADMIN — FLUDROCORTISONE ACETATE 0.1 MG: 0.1 TABLET ORAL at 05:14

## 2021-07-03 RX ADMIN — ACETAMINOPHEN 650 MG: 325 TABLET, FILM COATED ORAL at 21:03

## 2021-07-03 RX ADMIN — APIXABAN 5 MG: 5 TABLET, FILM COATED ORAL at 08:44

## 2021-07-03 RX ADMIN — LIDOCAINE 1 PATCH: 50 PATCH TOPICAL at 08:45

## 2021-07-03 RX ADMIN — LIOTHYRONINE SODIUM 25 MCG: 25 TABLET ORAL at 20:48

## 2021-07-03 RX ADMIN — HYDROCORTISONE 20 MG: 10 TABLET ORAL at 08:42

## 2021-07-03 RX ADMIN — CHOLECALCIFEROL TAB 25 MCG (1000 UNIT) 2000 UNITS: 25 TAB at 08:44

## 2021-07-03 RX ADMIN — POTASSIUM CHLORIDE 10 MEQ: 1500 TABLET, EXTENDED RELEASE ORAL at 08:43

## 2021-07-03 RX ADMIN — COLESEVELAM HYDROCHLORIDE 625 MG: 625 TABLET, FILM COATED ORAL at 20:55

## 2021-07-03 RX ADMIN — LEVOTHYROXINE SODIUM 75 MCG: 75 TABLET ORAL at 05:14

## 2021-07-03 RX ADMIN — FUROSEMIDE 20 MG: 20 TABLET ORAL at 05:14

## 2021-07-03 RX ADMIN — GABAPENTIN 400 MG: 400 CAPSULE ORAL at 08:44

## 2021-07-03 RX ADMIN — SUMATRIPTAN SUCCINATE 50 MG: 25 TABLET ORAL at 11:49

## 2021-07-03 RX ADMIN — APIXABAN 5 MG: 5 TABLET, FILM COATED ORAL at 20:48

## 2021-07-03 RX ADMIN — MONTELUKAST 10 MG: 10 TABLET, FILM COATED ORAL at 20:48

## 2021-07-03 RX ADMIN — TRAZODONE HYDROCHLORIDE 50 MG: 50 TABLET ORAL at 22:56

## 2021-07-03 RX ADMIN — COLESEVELAM HYDROCHLORIDE 625 MG: 625 TABLET, FILM COATED ORAL at 15:29

## 2021-07-03 RX ADMIN — CALCITONIN SALMON 200 UNITS: 200 SPRAY, METERED NASAL at 20:49

## 2021-07-03 RX ADMIN — POTASSIUM CHLORIDE 40 MEQ: 1500 TABLET, EXTENDED RELEASE ORAL at 17:52

## 2021-07-03 RX ADMIN — HYDROCORTISONE 10 MG: 10 TABLET ORAL at 15:29

## 2021-07-03 RX ADMIN — OMEPRAZOLE 20 MG: 20 CAPSULE, DELAYED RELEASE ORAL at 08:44

## 2021-07-03 RX ADMIN — CARVEDILOL 3.12 MG: 3.12 TABLET, FILM COATED ORAL at 08:44

## 2021-07-03 RX ADMIN — AMITRIPTYLINE HYDROCHLORIDE 10 MG: 10 TABLET, FILM COATED ORAL at 08:42

## 2021-07-03 RX ADMIN — CARVEDILOL 3.12 MG: 3.12 TABLET, FILM COATED ORAL at 17:52

## 2021-07-03 RX ADMIN — GABAPENTIN 400 MG: 400 CAPSULE ORAL at 20:48

## 2021-07-03 RX ADMIN — COLESEVELAM HYDROCHLORIDE 625 MG: 625 TABLET, FILM COATED ORAL at 08:42

## 2021-07-03 RX ADMIN — DULOXETINE 60 MG: 30 CAPSULE, DELAYED RELEASE ORAL at 20:48

## 2021-07-03 RX ADMIN — FUROSEMIDE 20 MG: 20 TABLET ORAL at 17:53

## 2021-07-03 ASSESSMENT — ENCOUNTER SYMPTOMS
FEVER: 0
SHORTNESS OF BREATH: 0
DIZZINESS: 0
HALLUCINATIONS: 0
HEADACHES: 0
BLURRED VISION: 0
VOMITING: 0
PALPITATIONS: 0
NAUSEA: 0

## 2021-07-03 ASSESSMENT — GAIT ASSESSMENTS
GAIT LEVEL OF ASSIST: CONTACT GUARD ASSIST
ASSISTIVE DEVICE: FRONT WHEEL WALKER
DISTANCE (FEET): 60

## 2021-07-03 ASSESSMENT — ACTIVITIES OF DAILY LIVING (ADL)
TOILETING_LEVEL_OF_ASSIST_DESCRIPTION: GRAB BAR
BED_CHAIR_WHEELCHAIR_TRANSFER_DESCRIPTION: INCREASED TIME;SET-UP OF EQUIPMENT
TOILET_TRANSFER_DESCRIPTION: GRAB BAR;SET-UP OF EQUIPMENT
BED_CHAIR_WHEELCHAIR_TRANSFER_DESCRIPTION: SQUAT PIVOT TRANSFER TO WHEELCHAIR;SET-UP OF EQUIPMENT
TOILET_TRANSFER_DESCRIPTION: GRAB BAR;INCREASED TIME

## 2021-07-03 ASSESSMENT — PAIN DESCRIPTION - PAIN TYPE: TYPE: ACUTE PAIN

## 2021-07-03 NOTE — PROGRESS NOTES
[facility] Medicine Daily Progress Note    Date of Service  7/3/2021    Chief Complaint:  Syncope  Orthostatic hypotension   AI  Transaminitis    Interval History:  No significant events or changes since last visit    Review of Systems  Review of Systems   Constitutional: Negative for fever.   Eyes: Negative for blurred vision.   Respiratory: Negative for shortness of breath.    Cardiovascular: Positive for leg swelling. Negative for palpitations.   Gastrointestinal: Negative for nausea and vomiting.   Neurological: Negative for dizziness and headaches.   Psychiatric/Behavioral: Negative for hallucinations.        Physical Exam  Temp:  [36.1 °C (96.9 °F)-36.6 °C (97.9 °F)] 36.1 °C (96.9 °F)  Pulse:  [57-80] 74  Resp:  [16-18] 18  BP: ()/(68-94) 148/88  SpO2:  [99 %] 99 %    Physical Exam  Vitals and nursing note reviewed.   Constitutional:       General: She is not in acute distress.  HENT:      Mouth/Throat:      Mouth: Mucous membranes are moist.      Pharynx: Oropharynx is clear.   Eyes:      General: No scleral icterus.  Neck:      Vascular: No JVD.   Cardiovascular:      Rate and Rhythm: Normal rate and regular rhythm.      Heart sounds: Normal heart sounds.   Pulmonary:      Effort: Pulmonary effort is normal.      Breath sounds: No wheezing or rales.   Abdominal:      General: Bowel sounds are normal.      Palpations: Abdomen is soft.   Musculoskeletal:      Cervical back: No rigidity.      Right lower leg: Edema present.      Left lower leg: Edema present.      Comments: Edema L>R   Skin:     General: Skin is warm and dry.   Neurological:      Mental Status: She is alert and oriented to person, place, and time.   Psychiatric:         Mood and Affect: Mood normal.         Behavior: Behavior normal.         Fluids    Intake/Output Summary (Last 24 hours) at 7/3/2021 1008  Last data filed at 7/3/2021 0900  Gross per 24 hour   Intake 1050 ml   Output --   Net 1050 ml       Laboratory                         Imaging    Assessment/Plan  Transaminitis  Assessment & Plan  Has an on & off hx -- rises up and then seems to self correct  LFT's stable recently  Hep profile wnl  Fe 136, sats 89%  US liver: heterogeneous and echogenic appearance of the liver can be seen in hepatic steatosis or hepatocellular disease.                mild prominence of the common duct likely represents ectasia in the setting of prior cholecystectomy.                aorta, IVC and pancreas are obscured by overlying bowel gas, limiting evaluation.  ? 2nd to meds  Note: has hx of hemachromatosis  Monitor    Syncope  Assessment & Plan  Has hx  Started about 5 years ago in [place] and then seemed to resolve  Pt states that over the last few months it has gotten worse  Has recent hx of mult hosp adms for syncope  Had syncope on am 6/29 while sitting in the shower and hit head -- CT head in ER ok  ? 2nd to AI and Orthostatic hypotension  Has abn TFT's -- see other assessment  Has recent PE and is on Eliquis  BNP: 176 (5/10) --> 20,599 (5/14) --> 2165 (5/25) --> 883 (6/30)  Echo (5/10): EF > 70%  TropI: has multiple studies recently -- unremarkable  TFT's -- see other assessment  Off Cozaar (was on for a few months) -- has SE of orthostatic hypotension  On Cortef: 20 mg qam and 10 mg afternoon  On Florinef  Note: to f/u with her Neurologist for autonomic disorder  Monitor    History of pulmonary embolism- (present on admission)  Assessment & Plan  Suspected 2nd to the Covid vaccine  May also be 2nd to immobility at home (but had no DVT)  On Eliquis    Adrenal insufficiency ([place]'s disease) (HCC)- (present on admission)  Assessment & Plan  Has seen Endocrine and suspects has AI  Has hx of occ low BP, orthostatic hypotension and syncope  Has abn TFT's -- see other assessment  Recently pt has been on several different doses of Cortef  On Cortef: 20 mg qam and 10 mg afternoon   On Florinef qam  D/W pt's Endocrinologist: she should also be on a growth  hormone -- was on Zomacton before                                            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 Canales [phone]  D/W patient: will have  bring in home med Zomacton    Hypertension- (present on admission)  Assessment & Plan  BP ok  Has orthostatic hypotension  Off Cozaar --> 2nd to SE of orthostatics  On Coreg  Note: now on Lasix (7/1)  Cont to monitor    Tachycardia- (present on admission)  Assessment & Plan  HR recently ok  HR was occ rising up for no apparent reason and then self corrects   ? POTS (also has orthostatic hypotension)  On Coreg  Pt drinking enough water  Sodium levels ok  Note: now on Lasix (7/1)  Monitor    Orthostatic hypotension- (present on admission)  Assessment & Plan  Has hx  Has abn TFT's -- see other assessment  Off Cozaar (has ortho SE)  On Cortef: 20 mg qam and 10 mg afternoon  On Florinef qam  Monitor    Hypothyroidism- (present on admission)  Assessment & Plan  TSH low at 0.02  FT4 ok at 1.27  Past TFT's has been quite variable  On Synthroid  On Cytomel  D/W pt's Endocrinologist -- these variations are due to the pt's complex medical situation    Depression- (present on admission)  Assessment & Plan  On Cymbalta    Chronic systolic heart failure (HCC)- (present on admission)  Assessment & Plan  Compensated  Edema increased recently  Echo (5/10): EF > 70%  BNP: 176 (5/10) --> 20,599 (5/14) --> 2,165 (5/25) --> 883 (6/30) --> 823 (7/3)  Pt feels like her legs are becoming more swollen recently  Off Cozaar -- 2nd to possible orthostatic SE  On Coreg  On Lasix (7/1) --> will increase dose  On K+ supplements --> will increase supplements and give an extra 40 meq today  Note: was previously on Lasix 20 mg daily at home -- was held on the last admission  Followed by Cardio as out patient  Monitor K+: 3.1 (7/3)     Gastroesophageal reflux disease without esophagitis- (present on admission)  Assessment & Plan  On  Prilosec

## 2021-07-03 NOTE — THERAPY
Physical Therapy   Daily Treatment     Patient Name: Donna Isaac  Age:  56 y.o., Sex:  female  Medical Record #: [id number]  Today's Date: 7/3/2021     Precautions  Precautions: Fall Risk, Other (See Comments)  Comments: Hx of syncopal episodes, Orthostatic Hypotension, abdominal binder OOB, R ankle brace, [place] (deaf R ear)    Subjective    Pt resting in bed, willing to participate but declines ambulation this am, willing to ambulate this pm.      Objective       07/03/21 0831   Transfer Functional Level of Assist   Bed, Chair, Wheelchair Transfer Contact Guard Assist   Bed Chair Wheelchair Transfer Description Increased time;Set-up of equipment   Toilet Transfers Contact Guard Assist   Toilet Transfer Description Grab bar;Increased time   Sitting Lower Body Exercises   Ankle Pumps 2 sets of 10   Hip Flexion 2 sets of 10   Hip Abduction 2 sets of 10   Hip Adduction 2 sets of 10   Long Arc Quad 2 sets of 10   Hamstring Curl 2 sets of 10   Other Exercises maurizio-med bike pedals x 3 minutes for LE ROM, pt reports increased pain and sicomfort however due to LE edema, therefore exercise terminated.    Comments 1.5# ankle wts for seated exercises, pt requires AAROM for L hip flexion due to pain and weakness.    Bed Mobility    Supine to Sit Supervised   Sit to Supine Supervised   Sit to Stand Contact Guard Assist   PT Total Time Spent   PT Individual Total Time Spent (Mins) 30   PT Charge Group   PT Therapeutic Exercise 2       Assessment    Pt pleasant and cooperative throughout PT, presents with limited activity tolerance and LE edema/ pain/ discomfort to limit activity this am.    Strengths: Able to follow instructions, Motivated for self care and independence, Independent prior level of function, Good insight into deficits/needs, Willingly participates in therapeutic activities, Pleasant and cooperative  Barriers: Decreased endurance, Generalized weakness, Orthostatic hypotension, Impaired balance, Limited  mobility    Plan    Ambulation, Ther ex for strengthening and endurance, Standing tolerance and standing balance, Education    Passport items to be completed:  Get in/out of bed safely, in/out of a vehicle, safely use mobility device, walk or wheel around home/community, navigate up and down stairs, show how to get up/down from the ground, ensure home is accessible, demonstrate HEP, complete caregiver training    Physical Therapy Problems (Active)     Problem: Mobility     Dates: Start: 07/01/21       Goal: STG-Within one week, patient will ambulate household distance of 150 feet with gait belt and SBA.     Dates: Start: 07/01/21          Goal: STG-Within one week, patient will ambulate up/down flight of stairs with B railings, gait belt, and CGA.     Dates: Start: 07/01/21             Problem: Mobility Transfers     Dates: Start: 06/29/21       Goal: STG-Within one week, patient will transfer bed to chair At [facility] with LRD In order to maximize self mobility     Dates: Start: 06/29/21       Goal Note filed on 07/01/21 1131 by Luis Morales, PT     CGA with transfers               Problem: PT-Long Term Goals     Dates: Start: 06/29/21       Goal: LTG-By discharge, patient will ambulate 150 ft With LRD at mod I in order to progress towards PLOF      Dates: Start: 06/29/21          Goal: LTG-By discharge, patient will transfer one surface to another At mod I with LRD In order to maximize self mobility     Dates: Start: 06/29/21          Goal: LTG-By discharge, patient will ambulate up/down flight of stairs With single HR at [facility] in order to enter/exit home safely     Dates: Start: 06/29/21

## 2021-07-03 NOTE — THERAPY
"Occupational Therapy  Daily Treatment     Patient Name: Donna Isaac  Age:  56 y.o., Sex:  female  Medical Record #: [id number]  Today's Date: 7/3/2021     Precautions  Precautions: (P) Fall Risk, Other (See Comments)  Comments: (P) Hx of syncopal episodes, Orthostatic Hypotension, abdominal binder OOB, R ankle brace, [place] (deaf R ear)    Safety   ADL Safety : Requires Supervision for Safety  Bathroom Safety: Requires Supervision for Safety, Requires Physical Assist for Safety, Impaired Insight into Safety, Impaired  Comments: see below functional levels for ADL performance details.     Subjective    \"Oh that was easy.\" pt reported about using the AE for LBD      Objective       07/03/21 1301   Precautions   Precautions Fall Risk;Other (See Comments)   Comments Hx of syncopal episodes, Orthostatic Hypotension, abdominal binder OOB, R ankle brace, [place] (deaf R ear)   Cognition    Level of Consciousness Alert   Functional Level of Assist   Grooming Modified Independent   Grooming Description Seated in wheelchair at sink  (wash hands)   Toileting Stand by Assist   Toileting Description Grab bar  (SBA 2/2 syncope )   Bed, Chair, Wheelchair Transfer Stand by Assist   Bed Chair Wheelchair Transfer Description Squat pivot transfer to wheelchair;Set-up of equipment   Toilet Transfers Stand by Assist   Toilet Transfer Description Grab bar;Set-up of equipment   Bed Mobility    Supine to Sit Supervised   Sit to Supine Supervised   Scooting Supervised   Rolling Supervised   Interdisciplinary Plan of Care Collaboration   Patient Position at End of Therapy Seated;Self Releasing Lap Belt Applied;Other (Comments)  (handoff to PT in gym)   OT Total Time Spent   OT Individual Total Time Spent (Mins) 30   OT Charge Group   OT Self Care / ADL 2       Assessment    Pt tolerated session well. Pt c/o headache at start of session but already received pain medications. Educated pt on LBD using AE- visual demo given on use of long " handled shoe horn, sock aid, and reacher. Long handled sponge issued to pt for showers. Pt able to sit EOB and completed LBD donning socks/slippers with increased time. Pt already purchased reacher for home but plans on getting sock-aid and potentially shoe horn due to dizziness when leaning forward. Visual demo given on use of reacher for donning/doffing underwear.     Strengths: Pleasant and cooperative, Willingly participates in therapeutic activities  Barriers: Decreased endurance, Fatigue, Generalized weakness, Hearing impairment, Orthostatic hypotension, Impaired activity tolerance, Impaired balance, Limited mobility    Plan    Cooking and/or laundry task with combination of standing and sitting, ADLs, IADLs, functional mobility, AE education, strength/endurance building, balance.      Passport items to be completed:  Perform bathroom transfers, complete dressing, complete feeding, get ready for the day, prepare a simple meal, participate in household tasks, adapt home for safety needs, demonstrate home exercise program, complete caregiver training        Occupational Therapy Goals (Active)     Problem: Dressing     Dates: Start: 06/29/21       Goal: STG-Within one week, patient will dress LB     Dates: Start: 06/29/21       Goal Note filed on 07/01/21 1139 by Dennis Alcantar MS,OTR/L     Max assist to manage LB clothing - Total assist for SILVERIO hose and R ankle brace, Max assist to don shoes, Min assist to manage briefs               Problem: Functional Transfers     Dates: Start: 06/29/21       Goal: STG-Within one week, patient will transfer to toilet     Dates: Start: 06/29/21       Goal Note filed on 07/01/21 1139 by Dennis Alcantar MS,OTR/L     CGA via DME               Problem: OT Long Term Goals     Dates: Start: 06/29/21       Goal: LTG-By discharge, patient will complete basic self care tasks     Dates: Start: 06/29/21       Goal Note filed on 06/29/21 0823 by Dennis Alcantar MS,OTR/L     1) Individualized  Goal:  Mod I for BADL's via AE/DME PRN  2) Interventions:  OT Self Care/ADL, OT Neuro Re-Ed/Balance, OT Therapeutic Activity, OT Evaluation, and OT Therapeutic Exercise            Goal: LTG-By discharge, patient will perform bathroom transfers     Dates: Start: 06/29/21       Goal Note filed on 06/29/21 0826 by Dennis Aclantar MS,OTR/L     1) Individualized Goal:  Mod I for bathroom transfers via DME PRN  2) Interventions: OT Self Care/ADL, OT Neuro Re-Ed/Balance, OT Therapeutic Activity, OT Evaluation, and OT Therapeutic Exercise               Problem: Toileting     Dates: Start: 06/29/21       Goal: STG-Within one week, patient will complete toileting tasks     Dates: Start: 06/29/21       Goal Note filed on 07/01/21 1139 by Dennis Alcantar MS,OTR/L     CGA/Min assist

## 2021-07-03 NOTE — THERAPY
"Physical Therapy   Daily Treatment     Patient Name: Donna Isaac  Age:  56 y.o., Sex:  female  Medical Record #: [id number]  Today's Date: 7/3/2021     Precautions  Precautions: Fall Risk, Other (See Comments)  Comments: Hx of syncopal episodes, Orthostatic Hypotension, abdominal binder OOB, R ankle brace, [place] (deaf R ear)    Subjective    Pt up in wc, present and ready for PT     Objective       07/03/21 1331   Vitals   Pulse 88   Patient BP Position Sitting   Blood Pressure 130/87   Vitals Comments during PT/ exercises   Gait Functional Level of Assist    Gait Level Of Assist Contact Guard Assist   Assistive Device Front Wheel Walker   Distance (Feet) 60   # of Times Distance was Traveled 4   Sitting Lower Body Exercises   Comments UE strength training sitting edge of mat 2 x 10 for bicep curls/ chest press (AAROM for R shoulder support/posoitioning), shoulder int/ext rotation, forearm pronation/supination and wrist flex/ext with 2# dumbells   Standing Lower Body Exercises   Step Up 3 sets of 10   Step Down 3 sets of 10   Comments UE support at // bars, alternating marcing up/down 1 x 10 for 6\" step stool and 2 x 10 with 4\" step stool.   PT Total Time Spent   PT Individual Total Time Spent (Mins) 60   PT Charge Group   PT Gait Training 2   PT Therapeutic Exercise 2       Assessment    Pt completed gait/ pre-stair stepping and strength training well, stable BP throughout therapy (increased with exercise).    Strengths: Able to follow instructions, Motivated for self care and independence, Independent prior level of function, Good insight into deficits/needs, Willingly participates in therapeutic activities, Pleasant and cooperative  Barriers: Decreased endurance, Generalized weakness, Orthostatic hypotension, Impaired balance, Limited mobility    Plan    Ambulation, Ther ex for strengthening and endurance, Standing tolerance and standing balance, Education     Passport items to be completed:  Get in/out of " bed safely, in/out of a vehicle, safely use mobility device, walk or wheel around home/community, navigate up and down stairs, show how to get up/down from the ground, ensure home is accessible, demonstrate HEP, complete caregiver training    Physical Therapy Problems (Active)     Problem: Mobility     Dates: Start: 07/01/21       Goal: STG-Within one week, patient will ambulate household distance of 150 feet with gait belt and SBA.     Dates: Start: 07/01/21          Goal: STG-Within one week, patient will ambulate up/down flight of stairs with B railings, gait belt, and CGA.     Dates: Start: 07/01/21             Problem: Mobility Transfers     Dates: Start: 06/29/21       Goal: STG-Within one week, patient will transfer bed to chair At [facility] with LRD In order to maximize self mobility     Dates: Start: 06/29/21       Goal Note filed on 07/01/21 1131 by Luis Morales, PT     CGA with transfers               Problem: PT-Long Term Goals     Dates: Start: 06/29/21       Goal: LTG-By discharge, patient will ambulate 150 ft With LRD at mod I in order to progress towards PLOF      Dates: Start: 06/29/21          Goal: LTG-By discharge, patient will transfer one surface to another At mod I with LRD In order to maximize self mobility     Dates: Start: 06/29/21          Goal: LTG-By discharge, patient will ambulate up/down flight of stairs With single HR at [facility] in order to enter/exit home safely     Dates: Start: 06/29/21

## 2021-07-03 NOTE — CARE PLAN
The patient is Watcher - Medium risk of patient condition declining or worsening      Problem: Pain - Standard  Goal: Alleviation of pain or a reduction in pain to the patient’s comfort goal  Outcome: Progressing  Note: Tylenol given PRN per MAR for c/o left hip pain with adequate relief. Pt sleeps ok. Will continue to monitor.     Problem: Fall Risk - Rehab  Goal: Patient will remain free from falls  Outcome: Progressing  Note: Pt uses call light  appropriately. Waits for assistance and does not attempt self transfer this shift. She cannot be left alone in the BR due to multiple history of syncopal episodes.

## 2021-07-03 NOTE — THERAPY
"Occupational Therapy  Daily Treatment     Patient Name: Donna Isaac  Age:  56 y.o., Sex:  female  Medical Record #: [id number]  Today's Date: 7/3/2021     Precautions  Precautions: (P) Fall Risk, Other (See Comments)  Comments: (P) Hx of syncopal episodes, Orthostatic Hypotension, abdominal binder OOB, R ankle brace, [place] (deaf R ear)    Safety   ADL Safety : Requires Supervision for Safety  Bathroom Safety: Requires Supervision for Safety, Requires Physical Assist for Safety, Impaired Insight into Safety, Impaired  Comments: see below functional levels for ADL performance details.     Subjective    \"I just bought a reacher for home so I don't have to worry about grabbing things off the floor.\"      Objective       07/03/21 1001   Precautions   Precautions Fall Risk;Other (See Comments)   Comments Hx of syncopal episodes, Orthostatic Hypotension, abdominal binder OOB, R ankle brace, [place] (deaf R ear)   Vitals   Pulse 98   Patient BP Position Sitting   Blood Pressure 129/82   O2 Delivery Device None - Room Air   Sitting Lower Body Exercises   Nustep Resistance Level 3;Time (See Comments)  (15 min; freq rest breaks; 79 spm; 1069 total steps)   Interdisciplinary Plan of Care Collaboration   Patient Position at End of Therapy In Bed;Call Light within Reach;Tray Table within Reach;Phone within Reach   OT Total Time Spent   OT Individual Total Time Spent (Mins) 60   OT Charge Group   OT Therapy Activity 2   OT Therapeutic Exercise  2       Assessment    Pt tolerated session well. Increased time taken to discuss pt's BLE swelling, unable to tolerate SILVERIO hose at this time- issued to tubigrips for BLE to reduce edema. Pt engaged in functional endurance task using nu-step for 15 minutes, frequent rest breaks needed 2/2 fatigue and checking BP throughout. Pt's BP dropped to 80/50 with HR of 224 after 5 min on level 3 on nu-step, able to stabilize to 123/82 with HR 90 within a couple of minutes of rest, vitals taken again " after completed nu-step and WFL: 129/82 with HR 98. Discussed pt's independence with ADL's- pt reported getting dizzy when having to lean forward, educated pt on use of AE of LBD- will go over AE with pt next session.     Strengths: Pleasant and cooperative, Willingly participates in therapeutic activities  Barriers: Decreased endurance, Fatigue, Generalized weakness, Hearing impairment, Orthostatic hypotension, Impaired activity tolerance, Impaired balance, Limited mobility    Plan    Cooking and/or laundry task with combination of standing and sitting, ADLs, IADLs, functional mobility, AE education, strength/endurance building, balance.      Passport items to be completed:  Perform bathroom transfers, complete dressing, complete feeding, get ready for the day, prepare a simple meal, participate in household tasks, adapt home for safety needs, demonstrate home exercise program, complete caregiver training     Occupational Therapy Goals (Active)     Problem: Dressing     Dates: Start: 06/29/21       Goal: STG-Within one week, patient will dress LB     Dates: Start: 06/29/21       Goal Note filed on 07/01/21 1139 by Dennis Alcantar MS,OTR/L     Max assist to manage LB clothing - Total assist for SILVERIO hose and R ankle brace, Max assist to don shoes, Min assist to manage briefs               Problem: Functional Transfers     Dates: Start: 06/29/21       Goal: STG-Within one week, patient will transfer to toilet     Dates: Start: 06/29/21       Goal Note filed on 07/01/21 1139 by Dennis Alcantar MS,OTR/L     CGA via DME               Problem: OT Long Term Goals     Dates: Start: 06/29/21       Goal: LTG-By discharge, patient will complete basic self care tasks     Dates: Start: 06/29/21       Goal Note filed on 06/29/21 0823 by Dennis Alcantar MS,OTR/L     1) Individualized Goal:  Mod I for BADL's via AE/DME PRN  2) Interventions:  OT Self Care/ADL, OT Neuro Re-Ed/Balance, OT Therapeutic Activity, OT Evaluation, and OT  Therapeutic Exercise            Goal: LTG-By discharge, patient will perform bathroom transfers     Dates: Start: 06/29/21       Goal Note filed on 06/29/21 0843 by Dennis Alcantar MS,OTR/L     1) Individualized Goal:  Mod I for bathroom transfers via DME PRN  2) Interventions: OT Self Care/ADL, OT Neuro Re-Ed/Balance, OT Therapeutic Activity, OT Evaluation, and OT Therapeutic Exercise               Problem: Toileting     Dates: Start: 06/29/21       Goal: STG-Within one week, patient will complete toileting tasks     Dates: Start: 06/29/21       Goal Note filed on 07/01/21 1139 by Dennis Alcantar MS,OTR/L     CGA/Min assist

## 2021-07-03 NOTE — THERAPY
"Physical Therapy   Daily Treatment     Patient Name: Donna Isaac  Age:  55 y.o., Sex:  female  Medical Record #: [id number]  Today's Date: 7/2/2021     Precautions  Precautions: Fall Risk, Other (See Comments)  Comments: Hx of syncopal episodes, Orthostatic Hypotension, abdominal binder OOB, R ankle brace, [place] (deaf R ear)    Subjective    Patient agreeable to PT.     Objective       07/02/21 1331   Precautions   Precautions Fall Risk;Other (See Comments)   Comments Hx of syncopal episodes, Orthostatic Hypotension, abdominal binder OOB, R ankle brace, [place] (deaf R ear)   Vitals   O2 (LPM) 0   O2 Delivery Device None - Room Air   Vitals Comments RN and charge RN with patient for vitals at 1350.   Transfer Functional Level of Assist   Bed, Chair, Wheelchair Transfer Minimal Assist   Bed Chair Wheelchair Transfer Description Increased time;Set-up of equipment;Supervision for safety;Verbal cueing  (gait belt, bed rail)   Toilet Transfers Contact Guard Assist   Toilet Transfer Description Grab bar;Increased time;Set-up of equipment;Supervision for safety;Verbal cueing  (gait belt required; please do not leave patient alone)   Standing Lower Body Exercises   Comments Standing at base of adaptive 4\" stairs with BUE support for reciprocal toe touches, after 4th rep, pt went from CGA at gait belt to Total A back into w/c; rapid reponse called.   Bed Mobility    Supine to Sit Supervised   Sit to Supine Supervised   Sit to Stand Minimal Assist  (CGA-Min A for 4 reps but Total A back into w/c once)   Scooting Supervised   Rolling Supervised   Interdisciplinary Plan of Care Collaboration   IDT Collaboration with  Nursing;Physician;Certified Nursing Assistant;Respiratory Therapist;Other (See Comments);Occupational Therapist  (charge RN)   Patient Position at End of Therapy In Bed;Call Light within Reach;Tray Table within Reach;Phone within Reach   Collaboration Comments Patient has syncopal episode during training today; " rapid response called; pt returned to supine at end of shortened session.   Therapy Missed   Missed Therapy (Minutes) 30   Reason For Missed Therapy Medical - Patient not Able To Participate;Medical - Patient on Hold from Therapy  (due to syncopal episode and assist from stand to sit into wc)   PT Total Time Spent   PT Individual Total Time Spent (Mins) 30   PT Charge Group   PT Therapeutic Exercise 1   PT Therapeutic Activities 1       Assessment    Patient motivated but became limited during syncope this session;CGA with transfers prior to syncope but Min A after 5 min rest following syncope; good cognition.    Strengths: Able to follow instructions, Motivated for self care and independence, Independent prior level of function, Good insight into deficits/needs, Willingly participates in therapeutic activities, Pleasant and cooperative  Barriers: Decreased endurance, Generalized weakness, Orthostatic hypotension, Impaired balance, Limited mobility    Plan    Ambulation, Ther ex for strengthening and endurance, Orientation to rehab facility, Standing tolerance and standing balance, Education    Passport items to be completed:  Passport items to be completed:  Get in/out of bed safely, in/out of a vehicle, safely use mobility device, walk or wheel around home/community, navigate up and down stairs, show how to get up/down from the ground, ensure home is accessible, demonstrate HEP, complete caregiver training    Physical Therapy Problems (Active)     Problem: Mobility     Dates: Start: 07/01/21       Goal: STG-Within one week, patient will ambulate household distance of 150 feet with gait belt and SBA.     Dates: Start: 07/01/21          Goal: STG-Within one week, patient will ambulate up/down flight of stairs with B railings, gait belt, and CGA.     Dates: Start: 07/01/21             Problem: Mobility Transfers     Dates: Start: 06/29/21       Goal: STG-Within one week, patient will transfer bed to chair At [facility] with  LRD In order to maximize self mobility     Dates: Start: 06/29/21       Goal Note filed on 07/01/21 1131 by Luis Morales, PT     CGA with transfers               Problem: PT-Long Term Goals     Dates: Start: 06/29/21       Goal: LTG-By discharge, patient will ambulate 150 ft With LRD at mod I in order to progress towards PLOF      Dates: Start: 06/29/21          Goal: LTG-By discharge, patient will transfer one surface to another At mod I with LRD In order to maximize self mobility     Dates: Start: 06/29/21          Goal: LTG-By discharge, patient will ambulate up/down flight of stairs With single HR at SPV in order to enter/exit home safely     Dates: Start: 06/29/21

## 2021-07-03 NOTE — CARE PLAN
The patient is     Progress made toward(s) clinical / shift goals:      Problem: Pain - Standard  Goal: Alleviation of pain or a reduction in pain to the patient’s comfort goal  Outcome: Progressing  Note: Pt able to participate in therapies and activities this shift. Tylenol given for HA.      Problem: Fall Risk - Rehab  Goal: Patient will remain free from falls  Outcome: Progressing  Note: Pt had syncope episode with PT this shift with no fall, patient has had multiple episode prior to admission and keep track of BP keep chu hose and abdominal binder OOB.

## 2021-07-04 PROCEDURE — A9270 NON-COVERED ITEM OR SERVICE: HCPCS | Performed by: PHYSICAL MEDICINE & REHABILITATION

## 2021-07-04 PROCEDURE — 700102 HCHG RX REV CODE 250 W/ 637 OVERRIDE(OP): Performed by: PHYSICAL MEDICINE & REHABILITATION

## 2021-07-04 PROCEDURE — 700102 HCHG RX REV CODE 250 W/ 637 OVERRIDE(OP): Performed by: HOSPITALIST

## 2021-07-04 PROCEDURE — 700111 HCHG RX REV CODE 636 W/ 250 OVERRIDE (IP)

## 2021-07-04 PROCEDURE — 700105 HCHG RX REV CODE 258

## 2021-07-04 PROCEDURE — 99231 SBSQ HOSP IP/OBS SF/LOW 25: CPT | Performed by: HOSPITALIST

## 2021-07-04 PROCEDURE — A9270 NON-COVERED ITEM OR SERVICE: HCPCS | Performed by: HOSPITALIST

## 2021-07-04 PROCEDURE — 770010 HCHG ROOM/CARE - REHAB SEMI PRIVAT*

## 2021-07-04 PROCEDURE — 700111 HCHG RX REV CODE 636 W/ 250 OVERRIDE (IP): Performed by: HOSPITALIST

## 2021-07-04 PROCEDURE — 700101 HCHG RX REV CODE 250: Performed by: PHYSICAL MEDICINE & REHABILITATION

## 2021-07-04 RX ORDER — MAGNESIUM SULFATE HEPTAHYDRATE 40 MG/ML
2 INJECTION, SOLUTION INTRAVENOUS ONCE
Status: COMPLETED | OUTPATIENT
Start: 2021-07-04 | End: 2021-07-04

## 2021-07-04 RX ADMIN — POTASSIUM CHLORIDE 40 MEQ: 1500 TABLET, EXTENDED RELEASE ORAL at 08:18

## 2021-07-04 RX ADMIN — COLESEVELAM HYDROCHLORIDE 625 MG: 625 TABLET, FILM COATED ORAL at 08:21

## 2021-07-04 RX ADMIN — CARVEDILOL 3.12 MG: 3.12 TABLET, FILM COATED ORAL at 16:45

## 2021-07-04 RX ADMIN — COLESEVELAM HYDROCHLORIDE 625 MG: 625 TABLET, FILM COATED ORAL at 14:22

## 2021-07-04 RX ADMIN — APIXABAN 5 MG: 5 TABLET, FILM COATED ORAL at 21:43

## 2021-07-04 RX ADMIN — FUROSEMIDE 40 MG: 40 TABLET ORAL at 05:31

## 2021-07-04 RX ADMIN — HYDROCORTISONE 10 MG: 10 TABLET ORAL at 14:22

## 2021-07-04 RX ADMIN — OMEPRAZOLE 20 MG: 20 CAPSULE, DELAYED RELEASE ORAL at 08:19

## 2021-07-04 RX ADMIN — LEVOTHYROXINE SODIUM 75 MCG: 75 TABLET ORAL at 05:31

## 2021-07-04 RX ADMIN — HYDROCORTISONE 20 MG: 10 TABLET ORAL at 08:19

## 2021-07-04 RX ADMIN — LIOTHYRONINE SODIUM 25 MCG: 25 TABLET ORAL at 21:43

## 2021-07-04 RX ADMIN — SUMATRIPTAN SUCCINATE 50 MG: 25 TABLET ORAL at 08:21

## 2021-07-04 RX ADMIN — CHOLECALCIFEROL TAB 25 MCG (1000 UNIT) 2000 UNITS: 25 TAB at 08:18

## 2021-07-04 RX ADMIN — MONTELUKAST 10 MG: 10 TABLET, FILM COATED ORAL at 21:43

## 2021-07-04 RX ADMIN — DULOXETINE 60 MG: 30 CAPSULE, DELAYED RELEASE ORAL at 21:43

## 2021-07-04 RX ADMIN — LIDOCAINE 1 PATCH: 50 PATCH TOPICAL at 08:21

## 2021-07-04 RX ADMIN — ACETAMINOPHEN 650 MG: 325 TABLET, FILM COATED ORAL at 16:45

## 2021-07-04 RX ADMIN — MAGNESIUM SULFATE HEPTAHYDRATE 2 G: 40 INJECTION, SOLUTION INTRAVENOUS at 16:11

## 2021-07-04 RX ADMIN — TRAZODONE HYDROCHLORIDE 50 MG: 50 TABLET ORAL at 21:43

## 2021-07-04 RX ADMIN — BENZOCAINE AND MENTHOL 1 LOZENGE: 15; 3.6 LOZENGE ORAL at 21:43

## 2021-07-04 RX ADMIN — COLESEVELAM HYDROCHLORIDE 625 MG: 625 TABLET, FILM COATED ORAL at 21:43

## 2021-07-04 RX ADMIN — AMITRIPTYLINE HYDROCHLORIDE 10 MG: 10 TABLET, FILM COATED ORAL at 08:18

## 2021-07-04 RX ADMIN — APIXABAN 5 MG: 5 TABLET, FILM COATED ORAL at 08:19

## 2021-07-04 RX ADMIN — GABAPENTIN 400 MG: 400 CAPSULE ORAL at 21:43

## 2021-07-04 RX ADMIN — GABAPENTIN 400 MG: 400 CAPSULE ORAL at 08:18

## 2021-07-04 RX ADMIN — FLUDROCORTISONE ACETATE 0.1 MG: 0.1 TABLET ORAL at 08:18

## 2021-07-04 RX ADMIN — CALCITONIN SALMON 200 UNITS: 200 SPRAY, METERED NASAL at 21:50

## 2021-07-04 ASSESSMENT — ENCOUNTER SYMPTOMS
COUGH: 0
DIZZINESS: 0
DIARRHEA: 0
FEVER: 0
BLURRED VISION: 0
NERVOUS/ANXIOUS: 0

## 2021-07-04 ASSESSMENT — PAIN DESCRIPTION - PAIN TYPE: TYPE: ACUTE PAIN

## 2021-07-04 ASSESSMENT — FIBROSIS 4 INDEX: FIB4 SCORE: 2.99

## 2021-07-04 NOTE — CARE PLAN
The patient is Stable - Low risk of patient condition declining or worsening         Progress made toward(s) clinical / shift goals:        Problem: Knowledge Deficit - Standard  Goal: Patient and family/care givers will demonstrate understanding of plan of care, disease process/condition, diagnostic tests and medications  Outcome: Progressing     Problem: Skin Integrity  Goal: Skin integrity is maintained or improved  Outcome: Progressing

## 2021-07-04 NOTE — CARE PLAN
Problem: Pain - Standard  Goal: Alleviation of pain or a reduction in pain to the patient’s comfort goal  Outcome: Progressing  Note: Pt able to participate in therapies and activities this shift. Patient able to verbalize pain level and verbalize an acceptable level of pain.      Problem: Skin Integrity  Goal: Skin integrity is maintained or improved  Outcome: Not Progressing  Note: Pt has increased edema this shift from 2+ generalized to 3+ pitting. Dr. Owen notified and started pt on increase in lasix as well as adding 40mg of potassium.

## 2021-07-04 NOTE — CARE PLAN
Problem: Knowledge Deficit - Standard  Goal: Patient and family/care givers will demonstrate understanding of plan of care, disease process/condition, diagnostic tests and medications  Outcome: Progressing  Note: Patient very knowledgeable regarding medications.     Problem: Skin Integrity  Goal: Skin integrity is maintained or improved  Outcome: Progressing     Problem: Fall Risk - Rehab  Goal: Patient will remain free from falls  Outcome: Progressing  Note: Patient educated on importance of utilizing call light to minimize the potential of injury from falls. Patient verbalizes understanding. Patient reports she has no forewarning of syncopal episodes.     The patient is Stable - Low risk of patient condition declining or worsening         Progress made toward(s) clinical / shift goals:  Patient is resting comfortably in bed.    Patient is not progressing towards the following goals:

## 2021-07-04 NOTE — PROGRESS NOTES
[facility] Medicine Daily Progress Note    Date of Service  7/4/2021    Chief Complaint:  Syncope  Orthostatic hypotension   AI  Transaminitis    Interval History:  No significant events or changes since last visit    Review of Systems  Review of Systems   Constitutional: Negative for fever.   Eyes: Negative for blurred vision.   Respiratory: Negative for cough.    Cardiovascular: Positive for leg swelling. Negative for chest pain.   Gastrointestinal: Negative for diarrhea.   Musculoskeletal: Negative for joint pain.   Neurological: Negative for dizziness.   Psychiatric/Behavioral: The patient is not nervous/anxious.         Physical Exam  Temp:  [35.8 °C (96.5 °F)-36.4 °C (97.5 °F)] 36.1 °C (97 °F)  Pulse:  [60-98] 60  Resp:  [18-19] 18  BP: (114-158)/(72-96) 114/72    Physical Exam  Vitals and nursing note reviewed.   Constitutional:       Appearance: She is not diaphoretic.   HENT:      Mouth/Throat:      Pharynx: No oropharyngeal exudate or posterior oropharyngeal erythema.   Eyes:      Extraocular Movements: Extraocular movements intact.   Neck:      Vascular: No carotid bruit or JVD.   Cardiovascular:      Rate and Rhythm: Normal rate and regular rhythm.      Heart sounds: Normal heart sounds.   Pulmonary:      Effort: Pulmonary effort is normal.      Breath sounds: No wheezing or rales.   Abdominal:      General: There is no distension.      Palpations: Abdomen is soft.      Tenderness: There is no abdominal tenderness.   Musculoskeletal:      Right lower leg: Edema present.      Left lower leg: Edema present.      Comments: Edema L>R   Skin:     General: Skin is warm and dry.   Neurological:      Mental Status: She is alert and oriented to person, place, and time.   Psychiatric:         Mood and Affect: Mood normal.         Behavior: Behavior normal.         Fluids    Intake/Output Summary (Last 24 hours) at 7/4/2021 0963  Last data filed at 7/4/2021 0800  Gross per 24 hour   Intake 360 ml   Output --   Net  360 ml       Laboratory      Recent Labs     07/03/21  1110   SODIUM 138   POTASSIUM 3.1*   CHLORIDE 102   CO2 22   GLUCOSE 94   BUN 12   CREATININE 0.68   CALCIUM 7.6*                   Imaging    Assessment/Plan  Transaminitis  Assessment & Plan  Has an on & off hx -- rises up and then seems to self correct  LFT's stable recently  Hep profile wnl  Fe 136, sats 89%  US liver: heterogeneous and echogenic appearance of the liver can be seen in hepatic steatosis or hepatocellular disease.                mild prominence of the common duct likely represents ectasia in the setting of prior cholecystectomy.                aorta, IVC and pancreas are obscured by overlying bowel gas, limiting evaluation.  ? 2nd to meds  Note: has hx of hemachromatosis  Monitor    Syncope  Assessment & Plan  Has hx  Started about 5 years ago in [place] and then seemed to resolve  Pt states that over the last few months it has gotten worse  Has recent hx of mult hosp adms for syncope  Had syncope on am 6/29 while sitting in the shower and hit head -- CT head in ER ok  ? 2nd to AI and Orthostatic hypotension  Has abn TFT's -- see other assessment  Has recent PE and is on Eliquis  BNP: 176 (5/10) --> 20,599 (5/14) --> 2165 (5/25) --> 883 (6/30) --> 823 (7/3)  Echo (5/10): EF > 70%  TropI: has multiple studies recently -- unremarkable  TFT's -- see other assessment  Off Cozaar (was on for a few months) -- has SE of orthostatic hypotension  On Cortef: 20 mg qam and 10 mg afternoon  On Florinef  Note: to f/u with her Neurologist for autonomic disorder  Monitor    History of pulmonary embolism- (present on admission)  Assessment & Plan  Suspected 2nd to the Covid vaccine  May also be 2nd to immobility at home (but had no DVT)  On Eliquis    Adrenal insufficiency (Valentín's disease) (HCC)- (present on admission)  Assessment & Plan  Has seen Endocrine and suspects has AI  Has hx of occ low BP, orthostatic hypotension and syncope  Has abn TFT's -- see  other assessment  Recently pt has been on several different doses of Cortef  On Cortef: 20 mg qam and 10 mg afternoon   On Florinef qam  Now on Zomacton  D/W pt's Endocrinologist: she should also be on a growth hormone -- was on Zomacton before                                            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 Canales [phone]    Hypertension- (present on admission)  Assessment & Plan  BP ok  Has orthostatic hypotension  Off Cozaar --> 2nd to SE of orthostatics  On Coreg  Note: now on Lasix (7/1) -- dose increased recently  Cont to monitor    Tachycardia- (present on admission)  Assessment & Plan  HR recently ok  HR was occ rising up for no apparent reason and then self corrects   ? POTS (also has orthostatic hypotension)  On Coreg  Pt drinking enough water  Sodium levels ok  Note: now on Lasix (7/1)  Monitor    Orthostatic hypotension- (present on admission)  Assessment & Plan  Has hx  Has abn TFT's -- see other assessment  Off Cozaar (has ortho SE)  On Cortef: 20 mg qam and 10 mg afternoon  On Florinef qam  Monitor    Hypothyroidism- (present on admission)  Assessment & Plan  TSH low at 0.02  FT4 ok at 1.27  Past TFT's has been quite variable  On Synthroid  On Cytomel  D/W pt's Endocrinologist -- these variations are due to the pt's complex medical situation    Depression- (present on admission)  Assessment & Plan  On Cymbalta    Chronic systolic heart failure (HCC)- (present on admission)  Assessment & Plan  Compensated  Edema increased recently  Echo (5/10): EF > 70%  BNP: 176 (5/10) --> 20,599 (5/14) --> 2,165 (5/25) --> 883 (6/30) --> 823 (7/3)  Pt feels like her legs are becoming more swollen recently  Off Cozaar -- 2nd to possible orthostatic SE  On Coreg  On Lasix (7/1) --> dose increase recently  On K+ supplements --> dose increased recently  Note: was previously on Lasix 20 mg daily at home -- was held on the last admission  Followed by Cardio  as out patient  Monitor K+: 3.1 (7/3) -- s/p extra dose on 7/3    Gastroesophageal reflux disease without esophagitis- (present on admission)  Assessment & Plan  [facility]

## 2021-07-05 PROBLEM — E83.42 HYPOMAGNESEMIA: Status: ACTIVE | Noted: 2021-07-05

## 2021-07-05 LAB — S PYO DNA SPEC NAA+PROBE: NOT DETECTED

## 2021-07-05 PROCEDURE — A9270 NON-COVERED ITEM OR SERVICE: HCPCS | Performed by: PHYSICAL MEDICINE & REHABILITATION

## 2021-07-05 PROCEDURE — 700101 HCHG RX REV CODE 250: Performed by: PHYSICAL MEDICINE & REHABILITATION

## 2021-07-05 PROCEDURE — 97535 SELF CARE MNGMENT TRAINING: CPT

## 2021-07-05 PROCEDURE — 700102 HCHG RX REV CODE 250 W/ 637 OVERRIDE(OP): Performed by: PHYSICAL MEDICINE & REHABILITATION

## 2021-07-05 PROCEDURE — 97530 THERAPEUTIC ACTIVITIES: CPT | Mod: CO

## 2021-07-05 PROCEDURE — 700102 HCHG RX REV CODE 250 W/ 637 OVERRIDE(OP): Performed by: HOSPITALIST

## 2021-07-05 PROCEDURE — 97110 THERAPEUTIC EXERCISES: CPT

## 2021-07-05 PROCEDURE — 99231 SBSQ HOSP IP/OBS SF/LOW 25: CPT | Performed by: HOSPITALIST

## 2021-07-05 PROCEDURE — 97530 THERAPEUTIC ACTIVITIES: CPT

## 2021-07-05 PROCEDURE — 700111 HCHG RX REV CODE 636 W/ 250 OVERRIDE (IP)

## 2021-07-05 PROCEDURE — A9270 NON-COVERED ITEM OR SERVICE: HCPCS | Performed by: HOSPITALIST

## 2021-07-05 PROCEDURE — 770010 HCHG ROOM/CARE - REHAB SEMI PRIVAT*

## 2021-07-05 PROCEDURE — 87651 STREP A DNA AMP PROBE: CPT

## 2021-07-05 PROCEDURE — 700105 HCHG RX REV CODE 258

## 2021-07-05 PROCEDURE — 99232 SBSQ HOSP IP/OBS MODERATE 35: CPT | Performed by: PHYSICAL MEDICINE & REHABILITATION

## 2021-07-05 RX ORDER — MIDODRINE HYDROCHLORIDE 2.5 MG/1
5 TABLET ORAL
Status: DISCONTINUED | OUTPATIENT
Start: 2021-07-05 | End: 2021-07-05

## 2021-07-05 RX ORDER — MONTELUKAST SODIUM 10 MG/1
10 TABLET ORAL EVERY EVENING
Status: DISCONTINUED | OUTPATIENT
Start: 2021-07-05 | End: 2021-07-12 | Stop reason: HOSPADM

## 2021-07-05 RX ADMIN — LEVOTHYROXINE SODIUM 75 MCG: 75 TABLET ORAL at 06:12

## 2021-07-05 RX ADMIN — HYDROCORTISONE 20 MG: 10 TABLET ORAL at 08:58

## 2021-07-05 RX ADMIN — CALCITONIN SALMON 200 UNITS: 200 SPRAY, METERED NASAL at 20:39

## 2021-07-05 RX ADMIN — APIXABAN 5 MG: 5 TABLET, FILM COATED ORAL at 20:45

## 2021-07-05 RX ADMIN — LIDOCAINE 1 PATCH: 50 PATCH TOPICAL at 08:56

## 2021-07-05 RX ADMIN — SUMATRIPTAN SUCCINATE 50 MG: 25 TABLET ORAL at 10:38

## 2021-07-05 RX ADMIN — CARVEDILOL 3.12 MG: 3.12 TABLET, FILM COATED ORAL at 08:59

## 2021-07-05 RX ADMIN — GABAPENTIN 400 MG: 400 CAPSULE ORAL at 20:45

## 2021-07-05 RX ADMIN — POTASSIUM CHLORIDE 40 MEQ: 1500 TABLET, EXTENDED RELEASE ORAL at 08:59

## 2021-07-05 RX ADMIN — COLESEVELAM HYDROCHLORIDE 625 MG: 625 TABLET, FILM COATED ORAL at 20:45

## 2021-07-05 RX ADMIN — OMEPRAZOLE 20 MG: 20 CAPSULE, DELAYED RELEASE ORAL at 09:00

## 2021-07-05 RX ADMIN — MONTELUKAST 10 MG: 10 TABLET, FILM COATED ORAL at 20:45

## 2021-07-05 RX ADMIN — GABAPENTIN 400 MG: 400 CAPSULE ORAL at 09:00

## 2021-07-05 RX ADMIN — AMITRIPTYLINE HYDROCHLORIDE 10 MG: 10 TABLET, FILM COATED ORAL at 08:59

## 2021-07-05 RX ADMIN — LIOTHYRONINE SODIUM 25 MCG: 25 TABLET ORAL at 20:45

## 2021-07-05 RX ADMIN — DULOXETINE 60 MG: 30 CAPSULE, DELAYED RELEASE ORAL at 20:45

## 2021-07-05 RX ADMIN — CHOLECALCIFEROL TAB 25 MCG (1000 UNIT) 2000 UNITS: 25 TAB at 09:00

## 2021-07-05 RX ADMIN — HYDROCORTISONE 10 MG: 10 TABLET ORAL at 15:58

## 2021-07-05 RX ADMIN — COLESEVELAM HYDROCHLORIDE 625 MG: 625 TABLET, FILM COATED ORAL at 09:00

## 2021-07-05 RX ADMIN — FUROSEMIDE 40 MG: 40 TABLET ORAL at 06:12

## 2021-07-05 RX ADMIN — FLUDROCORTISONE ACETATE 0.1 MG: 0.1 TABLET ORAL at 06:12

## 2021-07-05 RX ADMIN — COLESEVELAM HYDROCHLORIDE 625 MG: 625 TABLET, FILM COATED ORAL at 15:58

## 2021-07-05 RX ADMIN — TRAZODONE HYDROCHLORIDE 50 MG: 50 TABLET ORAL at 22:17

## 2021-07-05 RX ADMIN — APIXABAN 5 MG: 5 TABLET, FILM COATED ORAL at 08:59

## 2021-07-05 RX ADMIN — CARVEDILOL 3.12 MG: 3.12 TABLET, FILM COATED ORAL at 16:47

## 2021-07-05 ASSESSMENT — ENCOUNTER SYMPTOMS
ABDOMINAL PAIN: 0
CHILLS: 0
NERVOUS/ANXIOUS: 0
SHORTNESS OF BREATH: 0
NAUSEA: 0
DIARRHEA: 0
VOMITING: 0
FEVER: 0

## 2021-07-05 ASSESSMENT — ACTIVITIES OF DAILY LIVING (ADL)
TOILETING_LEVEL_OF_ASSIST_DESCRIPTION: GRAB BAR;INCREASED TIME
BED_CHAIR_WHEELCHAIR_TRANSFER_DESCRIPTION: ADAPTIVE EQUIPMENT;INCREASED TIME;SUPERVISION FOR SAFETY;VERBAL CUEING;SET-UP OF EQUIPMENT
TOILET_TRANSFER_DESCRIPTION: ADAPTIVE EQUIPMENT;GRAB BAR;INCREASED TIME;SET-UP OF EQUIPMENT;SUPERVISION FOR SAFETY

## 2021-07-05 ASSESSMENT — PAIN DESCRIPTION - PAIN TYPE
TYPE: ACUTE PAIN
TYPE: ACUTE PAIN

## 2021-07-05 ASSESSMENT — GAIT ASSESSMENTS
ASSISTIVE DEVICE: PARALLEL BARS
GAIT LEVEL OF ASSIST: UNABLE TO PARTICIPATE
GAIT LEVEL OF ASSIST: UNABLE TO PARTICIPATE
DISTANCE (FEET): 0
DISTANCE (FEET): 0

## 2021-07-05 NOTE — THERAPY
"Occupational Therapy  Daily Treatment     Patient Name: Donna Isaac  Age:  56 y.o., Sex:  female  Medical Record #: [id number]  Today's Date: 7/5/2021     Precautions  Precautions: (P) Fall Risk, Other (See Comments)  Comments: (P) Hx of syncopal episodes, Orthostatic Hypotension, abdominal binder OOB, R ankle brace, [place] (deaf R ear)    Safety   ADL Safety : Requires Supervision for Safety  Bathroom Safety: Requires Supervision for Safety, Requires Physical Assist for Safety, Impaired Insight into Safety, Impaired  Comments: see below functional levels for ADL performance details.     Subjective    \" I need to use the bathroom .\"     Objective       07/05/21 0901   Precautions   Precautions Fall Risk;Other (See Comments)   Comments Hx of syncopal episodes, Orthostatic Hypotension, abdominal binder OOB, R ankle brace, [place] (deaf R ear)   Functional Level of Assist   Grooming Modified Independent  (seated at sink )   Toileting Supervision   Bed, Chair, Wheelchair Transfer Supervised   Toilet Transfers Supervised   Sitting Upper Body Exercises   Upper Extremity Bike Level 2 Resistance  (x 2.5 minutes x 2  and x 5 minutes  motomed )   Interdisciplinary Plan of Care Collaboration   Patient Position at End of Therapy Seated;Self Releasing Lap Belt Applied  (in gym  hand off to PT for tx )   OT Total Time Spent   OT Individual Total Time Spent (Mins) 60   OT Charge Group   OT Self Care / ADL 2   OT Therapy Activity 1   OT Therapeutic Exercise  1      light MLD  Groin to knee  X 7 minutes to   Each LE . Tubgrip  Cut and sized and donned    From  metatarsal heads  To knee     Assessment    Completed tx no complaints     Strengths: Pleasant and cooperative, Willingly participates in therapeutic activities  Barriers: Decreased endurance, Fatigue, Generalized weakness, Hearing impairment, Orthostatic hypotension, Impaired activity tolerance, Impaired balance, Limited mobility    Plan       Cooking and/or laundry task with " combination of standing and sitting, ADLs, IADLs, functional mobility, AE education, strength/endurance building, balance.      Passport items to be completed:  Perform bathroom transfers, complete dressing, complete feeding, get ready for the day, prepare a simple meal, participate in household tasks, adapt home for safety needs, demonstrate home exercise program, complete caregiver training     Occupational Therapy Goals (Active)     Problem: Dressing     Dates: Start: 06/29/21       Goal: STG-Within one week, patient will dress LB     Dates: Start: 06/29/21       Goal Note filed on 07/01/21 1139 by Dennis Alcantar MS,OTR/L     Max assist to manage LB clothing - Total assist for SILVERIO hose and R ankle brace, Max assist to don shoes, Min assist to manage briefs               Problem: Functional Transfers     Dates: Start: 06/29/21       Goal: STG-Within one week, patient will transfer to toilet     Dates: Start: 06/29/21       Goal Note filed on 07/01/21 1139 by Dennis Alcantar MS,OTR/L     CGA via DME               Problem: OT Long Term Goals     Dates: Start: 06/29/21       Goal: LTG-By discharge, patient will complete basic self care tasks     Dates: Start: 06/29/21       Goal Note filed on 06/29/21 0823 by Dennis Alcantar MS,OTR/L     1) Individualized Goal:  Mod I for BADL's via AE/DME PRN  2) Interventions:  OT Self Care/ADL, OT Neuro Re-Ed/Balance, OT Therapeutic Activity, OT Evaluation, and OT Therapeutic Exercise            Goal: LTG-By discharge, patient will perform bathroom transfers     Dates: Start: 06/29/21       Goal Note filed on 06/29/21 0823 by Dennis Alcantar MS,OTR/L     1) Individualized Goal:  Mod I for bathroom transfers via DME PRN  2) Interventions: OT Self Care/ADL, OT Neuro Re-Ed/Balance, OT Therapeutic Activity, OT Evaluation, and OT Therapeutic Exercise               Problem: Toileting     Dates: Start: 06/29/21       Goal: STG-Within one week, patient will complete toileting tasks     Dates: Start:  06/29/21       Goal Note filed on 07/01/21 1139 by Dennis Alcantar, MS,OTR/L     CGA/Min assist

## 2021-07-05 NOTE — THERAPY
"Occupational Therapy  Daily Treatment     Patient Name: Donna Isaac  Age:  56 y.o., Sex:  female  Medical Record #: [id number]  Today's Date: 7/5/2021     Precautions  Precautions: Fall Risk, Other (See Comments)  Comments: Hx of syncopal episodes, Orthostatic Hypotension, abdominal binder OOB, R ankle brace, [place] (deaf R ear)    Safety   ADL Safety : Requires Supervision for Safety  Bathroom Safety: Requires Supervision for Safety, Requires Physical Assist for Safety, Impaired Insight into Safety, Impaired  Comments: see below functional levels for ADL performance details.     Subjective    \"They told me I was going to do outpatient PT after this, but I don't know how I would get there. I can't take an uber because I'd hate to faint while inside the uber and not be able to get the medical attention I need.\"     Objective       07/05/21 1301   Vitals   Vitals Comments no s/s of distress   Functional Level of Assist   Toileting Stand by Assist   Toileting Description Grab bar;Increased time  (SBA 2/2 potential for syncope)   Bed, Chair, Wheelchair Transfer Stand by Assist   Bed Chair Wheelchair Transfer Description   (SBA 2/2 potential for syncope)   Toilet Transfers Stand by Assist   Toilet Transfer Description   (SBA 2/2 potential for syncope)   Sitting Lower Body Exercises   Nustep Resistance Level 3  (10 minutes; 3 rest breaks)   Interdisciplinary Plan of Care Collaboration   Patient Position at End of Therapy Seated;Self Releasing Lap Belt Applied;Call Light within Reach;Tray Table within Reach;Phone within Reach   OT Total Time Spent   OT Individual Total Time Spent (Mins) 30   OT Charge Group   OT Self Care / ADL 1   OT Therapeutic Exercise  1       Assessment    Pt with good insight on importance of her not driving at this point in time and that riding alone in an uber might also be unsafe. Pt continues to have impaired endurance causing her to required frequent rest breaks. Reviewed recommendation to " have a chair on the landing between her flights of stairs.    Strengths: Pleasant and cooperative, Willingly participates in therapeutic activities  Barriers: Decreased endurance, Fatigue, Generalized weakness, Hearing impairment, Orthostatic hypotension, Impaired activity tolerance, Impaired balance, Limited mobility    Plan    Cooking and/or laundry task with combination of standing and sitting, ADLs, IADLs, functional mobility, AE education, strength/endurance building, balance.      Passport items to be completed:  Perform bathroom transfers, complete dressing, complete feeding, get ready for the day, prepare a simple meal, participate in household tasks, adapt home for safety needs, demonstrate home exercise program, complete caregiver training     Occupational Therapy Goals (Active)     Problem: Dressing     Dates: Start: 06/29/21       Goal: STG-Within one week, patient will dress LB     Dates: Start: 06/29/21       Goal Note filed on 07/01/21 1139 by Dennis Alcantar MS,OTR/L     Max assist to manage LB clothing - Total assist for SILVERIO hose and R ankle brace, Max assist to don shoes, Min assist to manage briefs               Problem: Functional Transfers     Dates: Start: 06/29/21       Goal: STG-Within one week, patient will transfer to toilet     Dates: Start: 06/29/21       Goal Note filed on 07/01/21 1139 by Dennis Alcantar MS,OTR/L     CGA via DME               Problem: OT Long Term Goals     Dates: Start: 06/29/21       Goal: LTG-By discharge, patient will complete basic self care tasks     Dates: Start: 06/29/21       Goal Note filed on 06/29/21 0823 by Dennis Alcantar MS,OTR/L     1) Individualized Goal:  Mod I for BADL's via AE/DME PRN  2) Interventions:  OT Self Care/ADL, OT Neuro Re-Ed/Balance, OT Therapeutic Activity, OT Evaluation, and OT Therapeutic Exercise            Goal: LTG-By discharge, patient will perform bathroom transfers     Dates: Start: 06/29/21       Goal Note filed on 06/29/21 0823 by Dennis  MS Ortiz,OTR/L     1) Individualized Goal:  Mod I for bathroom transfers via DME PRN  2) Interventions: OT Self Care/ADL, OT Neuro Re-Ed/Balance, OT Therapeutic Activity, OT Evaluation, and OT Therapeutic Exercise               Problem: Toileting     Dates: Start: 06/29/21       Goal: STG-Within one week, patient will complete toileting tasks     Dates: Start: 06/29/21       Goal Note filed on 07/01/21 1139 by Dennis Alcantar MS,OTR/L     CGA/Min assist

## 2021-07-05 NOTE — PROGRESS NOTES
Per order orthostatic BP were taken, when pt was standing there were no c/o dizziness but pt had syncope episode with CNA and was lowered to the bed. Pt regained consciousness about one full minute later and BP was 132/81 supine. Charge nurse Venessa and Dr. Owen notified.

## 2021-07-05 NOTE — THERAPY
Physical Therapy   Daily Treatment     Patient Name: Donna Isaac  Age:  56 y.o., Sex:  female  Medical Record #: [id number]  Today's Date: 7/5/2021     Precautions  Precautions: Fall Risk, Other (See Comments)  Comments: Hx of syncopal episodes, Orthostatic Hypotension, abdominal binder OOB, R ankle brace, [place] (deaf R ear)    Subjective    Patient agreeable to PT.     Objective       07/05/21 1445   Vitals   Pulse 74   Patient BP Position Sitting   Blood Pressure 114/88   Pulse Oximetry 99 %   O2 (LPM) 0   O2 Delivery Device None - Room Air   Vitals Comments Multiple BPs per flowsheet with Tilt table usage and monitoring today; RN notified; pt had abdominal binder donned throughout session.   Gait Functional Level of Assist    Gait Level Of Assist Unable to Participate   Distance (Feet) 0   # of Times Distance was Traveled 0   Deviation   (Safety concerns; CNA, RN, pt report syncope during ortho BPs)   Stairs Functional Level of Assist   Level of Assist with Stairs Unable to Participate   # of Stairs Climbed 0   Stairs Description Safety concerns   Transfer Functional Level of Assist   Bed, Chair, Wheelchair Transfer Contact Guard Assist   Bed Chair Wheelchair Transfer Description Adaptive equipment;Increased time;Supervision for safety;Verbal cueing;Set-up of equipment   Toilet Transfers Contact Guard Assist   Toilet Transfer Description Adaptive equipment;Grab bar;Increased time;Set-up of equipment;Supervision for safety   Bed Mobility    Supine to Sit Stand by Assist   Sit to Supine Moderate Assist   Sit to Stand Contact Guard Assist   Scooting Stand by Assist   Rolling Supervised   Interdisciplinary Plan of Care Collaboration   IDT Collaboration with  Certified Nursing Assistant;Nursing   Patient Position at End of Therapy In Bed;Call Light within Reach;Tray Table within Reach;Phone within Reach   Collaboration Comments Syncope with orthostatic BPs earlier; abdominal binder donned as usual; reviewed BPs  with RN at end of session; RN present at end of meds admin   PT Total Time Spent   PT Individual Total Time Spent (Mins) 75   PT Charge Group   PT Therapeutic Exercise 4   PT Therapeutic Activities 1     Tilt Table focus with BPs per flowsheet; from sitting x5 min, supine x5 min, 30 deg for 20 min, 45 deg for 5 min, 15 deg for 5 min, supine for 3 min, and return to sitting for 10 min.  Asymptomatic, able to maintain conversation and 100% accuracy with short additional mathematics questions asked at each stage.  No syncope this session but variable BP and HR response per machine.  Pt has abdominal binder donned for all OOB time today.    Assessment    No syncope this session but variable BP and HR response per machine; syncope likely related to orthostatic BP with possible cardiac inclusion?; fair to good cognition throughout session; good motivation and understanding with treatment goals & education discussed.    Strengths: Able to follow instructions, Motivated for self care and independence, Independent prior level of function, Good insight into deficits/needs, Willingly participates in therapeutic activities, Pleasant and cooperative  Barriers: Decreased endurance, Generalized weakness, Orthostatic hypotension, Impaired balance, Limited mobility    Plan    Ambulation, Ther ex for strengthening and endurance, Standing tolerance and standing balance, Education.  D/C scheduled for Thursday 7/8/21.    Passport items to be completed:  Passport items to be completed:  Get in/out of bed safely, in/out of a vehicle, safely use mobility device, walk or wheel around home/community, navigate up and down stairs, show how to get up/down from the ground, ensure home is accessible, demonstrate HEP, complete caregiver training    Physical Therapy Problems (Active)     Problem: Mobility     Dates: Start: 07/01/21       Goal: STG-Within one week, patient will ambulate household distance of 150 feet with gait belt and SBA.      Dates: Start: 07/01/21          Goal: STG-Within one week, patient will ambulate up/down flight of stairs with B railings, gait belt, and CGA.     Dates: Start: 07/01/21             Problem: Mobility Transfers     Dates: Start: 06/29/21       Goal: STG-Within one week, patient will transfer bed to chair At [facility] with LRD In order to maximize self mobility     Dates: Start: 06/29/21       Goal Note filed on 07/01/21 1131 by Luis Morales, PT     CGA with transfers               Problem: PT-Long Term Goals     Dates: Start: 06/29/21       Goal: LTG-By discharge, patient will ambulate 150 ft With LRD at mod I in order to progress towards PLOF      Dates: Start: 06/29/21          Goal: LTG-By discharge, patient will transfer one surface to another At mod I with LRD In order to maximize self mobility     Dates: Start: 06/29/21          Goal: LTG-By discharge, patient will ambulate up/down flight of stairs With single HR at [facility] in order to enter/exit home safely     Dates: Start: 06/29/21

## 2021-07-05 NOTE — THERAPY
Physical Therapy   Daily Treatment     Patient Name: Donna Isaac  Age:  56 y.o., Sex:  female  Medical Record #: [id number]  Today's Date: 7/5/2021     Precautions  Precautions: Fall Risk, Other (See Comments)  Comments: Hx of syncopal episodes, Orthostatic Hypotension, abdominal binder OOB, R ankle brace, [place] (deaf R ear)    Subjective    Patient agreeable to PT.     Objective       07/05/21 1001   Vitals   Pulse (!) 125  (RN present)   Patient BP Position Sitting  (abdominal binder donned)   Blood Pressure (!) 55/26  (RN present; MD notified)   O2 (LPM) 0   O2 Delivery Device None - Room Air   Gait Functional Level of Assist    Gait Level Of Assist Unable to Participate   Assistive Device Parallel Bars   Distance (Feet) 0   # of Times Distance was Traveled 0   Deviation   (Unable due to hypotension today)   Standing Lower Body Exercises   Comments Unable due to hypotension today   Bed Mobility    Sit to Stand Unable to Participate  (due to hypotension today)   Interdisciplinary Plan of Care Collaboration   IDT Collaboration with  Certified O.T. Assistant  (PETERSEN);Nursing;Physician   Patient Position at End of Therapy Seated;Self Releasing Lap Belt Applied;Call Light within Reach;Tray Table within Reach;Phone within Reach   Collaboration Comments Handoff from PETERSEN. Unable to attempt standing as planned due to low BP throughout session; RNp resent for 20 min of session for BP and HR monitoring and removal of IV when completed.  Discussed BP with pt and MD during session.   PT Total Time Spent   PT Individual Total Time Spent (Mins) 45   PT Charge Group   PT Therapeutic Activities 3       Assessment    Patient was limited by BP again; limited to sitting management of BP in hopes of normal reading for standing tolerance measurement with RN present; unable to obtain; education and discussion throughout session; good motivation from patient.    Strengths: Able to follow instructions, Motivated for self care and  independence, Independent prior level of function, Good insight into deficits/needs, Willingly participates in therapeutic activities, Pleasant and cooperative  Barriers: Decreased endurance, Generalized weakness, Orthostatic hypotension, Impaired balance, Limited mobility    Plan    Ambulation, Ther ex for strengthening and endurance, Standing tolerance and standing balance, Education.  D/C scheduled for Thursday 7/8/21.    Passport items to be completed:  Passport items to be completed:  Get in/out of bed safely, in/out of a vehicle, safely use mobility device, walk or wheel around home/community, navigate up and down stairs, show how to get up/down from the ground, ensure home is accessible, demonstrate HEP, complete caregiver training    Physical Therapy Problems (Active)     Problem: Mobility     Dates: Start: 07/01/21       Goal: STG-Within one week, patient will ambulate household distance of 150 feet with gait belt and SBA.     Dates: Start: 07/01/21          Goal: STG-Within one week, patient will ambulate up/down flight of stairs with B railings, gait belt, and CGA.     Dates: Start: 07/01/21             Problem: Mobility Transfers     Dates: Start: 06/29/21       Goal: STG-Within one week, patient will transfer bed to chair At [facility] with LRD In order to maximize self mobility     Dates: Start: 06/29/21       Goal Note filed on 07/01/21 1131 by Luis Morales, PT     CGA with transfers               Problem: PT-Long Term Goals     Dates: Start: 06/29/21       Goal: LTG-By discharge, patient will ambulate 150 ft With LRD at mod I in order to progress towards PLOF      Dates: Start: 06/29/21          Goal: LTG-By discharge, patient will transfer one surface to another At mod I with LRD In order to maximize self mobility     Dates: Start: 06/29/21          Goal: LTG-By discharge, patient will ambulate up/down flight of stairs With single HR at [facility] in order to enter/exit home safely     Dates: Start:  06/29/21

## 2021-07-05 NOTE — PROGRESS NOTES
[facility] Medicine Daily Progress Note    Date of Service  7/5/2021    Chief Complaint:  Syncope  Orthostatic hypotension   AI  Transaminitis    Interval History:  No significant events or changes since last visit    Review of Systems  Review of Systems   Constitutional: Negative for chills and fever.   Respiratory: Negative for shortness of breath.    Cardiovascular: Positive for leg swelling. Negative for chest pain.   Gastrointestinal: Negative for abdominal pain, diarrhea, nausea and vomiting.   Psychiatric/Behavioral: The patient is not nervous/anxious.         Physical Exam  Temp:  [36.3 °C (97.4 °F)-36.8 °C (98.2 °F)] 36.3 °C (97.4 °F)  Pulse:  [69-83] 69  Resp:  [18-19] 18  BP: (106-169)/(74-86) 169/76  SpO2:  [95 %-97 %] 95 %    Physical Exam  Vitals and nursing note reviewed.   Constitutional:       Appearance: Normal appearance.   HENT:      Head: Atraumatic.   Eyes:      Conjunctiva/sclera: Conjunctivae normal.      Pupils: Pupils are equal, round, and reactive to light.   Neck:      Vascular: No JVD.   Cardiovascular:      Rate and Rhythm: Normal rate and regular rhythm.      Heart sounds: Normal heart sounds. No murmur heard.     Pulmonary:      Effort: Pulmonary effort is normal.      Breath sounds: No stridor. No wheezing or rales.   Abdominal:      General: There is no distension.      Palpations: Abdomen is soft.      Tenderness: There is no abdominal tenderness.   Musculoskeletal:      Cervical back: Normal range of motion and neck supple.      Right lower leg: Edema present.      Left lower leg: Edema present.      Comments: Edema L>R   Skin:     General: Skin is warm and dry.      Findings: No rash.   Neurological:      Mental Status: She is alert and oriented to person, place, and time.   Psychiatric:         Mood and Affect: Mood normal.         Behavior: Behavior normal.         Fluids    Intake/Output Summary (Last 24 hours) at 7/5/2021 1006  Last data filed at 7/5/2021 0612  Gross per 24  hour   Intake 610 ml   Output --   Net 610 ml       Laboratory      Recent Labs     21  1110   SODIUM 138   POTASSIUM 3.1*   CHLORIDE 102   CO2 22   GLUCOSE 94   BUN 12   CREATININE 0.68   CALCIUM 7.6*                   Imaging    Assessment/Plan  Hypomagnesemia  Assessment & Plan  M.3  S/P IV Mg 2g  Monitor    Transaminitis  Assessment & Plan  Has an on & off hx -- rises up and then seems to self correct  LFT's stable recently  Hep profile wnl  Fe 136, sats 89%  US liver: heterogeneous and echogenic appearance of the liver can be seen in hepatic steatosis or hepatocellular disease.                mild prominence of the common duct likely represents ectasia in the setting of prior cholecystectomy.                aorta, IVC and pancreas are obscured by overlying bowel gas, limiting evaluation.  ? 2nd to meds  Note: has hx of hemachromatosis  Monitor    Syncope  Assessment & Plan  Has hx  Started about 5 years ago in [place] and then seemed to resolve  Pt states that over the last few months it has gotten worse  Has recent hx of mult hosp adms for syncope  Had syncope on am  while sitting in the shower and hit head -- CT head in ER ok  ? 2nd to AI and Orthostatic hypotension  Has abn TFT's -- see other assessment  Has recent PE and is on Eliquis  BNP: 176 (5/10) --> 20,599 () --> 2165 () --> 883 () --> 823 (7/3)  Echo (5/10): EF > 70%  TropI: has multiple studies recently -- unremarkable  TFT's -- see other assessment  Off Cozaar (was on for a few months) -- has SE of orthostatic hypotension  On Cortef: 20 mg qam and 10 mg afternoon  On Florinef  Note: to f/u with her Neurologist for autonomic disorder  Monitor    History of pulmonary embolism- (present on admission)  Assessment & Plan  Suspected 2nd to the Covid vaccine  May also be 2nd to immobility at home (but had no DVT)  On Eliquis    Adrenal insufficiency (York's disease) (HCC)- (present on admission)  Assessment & Plan  Has seen  Endocrine and suspects has AI  Has hx of occ low BP, orthostatic hypotension and syncope  Has abn TFT's -- see other assessment  Recently pt has been on several different doses of Cortef  On Cortef: 20 mg qam and 10 mg afternoon   On Florinef qam  Now on Zomacton  D/W pt's Endocrinologist: she should also be on a growth hormone -- was on Zomacton before                                            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 Canales [phone]    Hypertension- (present on admission)  Assessment & Plan  BP ok but occ rises up  Has orthostatic hypotension  Off Cozaar --> 2nd to SE of orthostatics  On Coreg  Note: now on Lasix (7/1) -- dose increased recently  Permitting a little elevated BP since pt has sig orthostatics and syncope  Cont to monitor    Tachycardia- (present on admission)  Assessment & Plan  HR recently ok  HR was occ rising up for no apparent reason and then self corrects   ? POTS (also has orthostatic hypotension)  On Coreg  Pt drinking enough water  Sodium levels ok  Note: now on Lasix (7/1)  Monitor    Orthostatic hypotension- (present on admission)  Assessment & Plan  Has hx  Has abn TFT's -- see other assessment  Off Cozaar (has ortho SE)  On Cortef: 20 mg qam and 10 mg afternoon  On Florinef qam  Monitor    Hypothyroidism- (present on admission)  Assessment & Plan  TSH low at 0.02  FT4 ok at 1.27  Past TFT's has been quite variable  On Synthroid  On Cytomel  D/W pt's Endocrinologist -- these variations are due to the pt's complex medical situation    Depression- (present on admission)  Assessment & Plan  On Cymbalta    Chronic systolic heart failure (HCC)- (present on admission)  Assessment & Plan  Compensated  Edema increased recently  Echo (5/10): EF > 70%  BNP: 176 (5/10) --> 20,599 (5/14) --> 2,165 (5/25) --> 883 (6/30) --> 823 (7/3)  Pt feels like her legs are becoming more swollen recently  Off Cozaar -- 2nd to possible orthostatic  SE  On Coreg  On Lasix (7/1) --> dose increase recently  On K+ supplements --> dose increased recently  Note: was previously on Lasix 20 mg daily at home -- was held on the last admission  Followed by Cardio as out patient  Monitor K+: 3.1 (7/3) -- s/p extra dose on 7/3    Gastroesophageal reflux disease without esophagitis- (present on admission)  Assessment & Plan  On Prilosec

## 2021-07-05 NOTE — CARE PLAN
Problem: Knowledge Deficit - Standard  Goal: Patient and family/care givers will demonstrate understanding of plan of care, disease process/condition, diagnostic tests and medications  Outcome: Progressing  Note: Pt agrees with plan of care tonight and with scheduled IV abts.     Problem: Pain - Standard  Goal: Alleviation of pain or a reduction in pain to the patient’s comfort goal  7/5/2021 0137 by Mariella Macias, L.P.N.  Outcome: Progressing  7/5/2021 0135 by Mariella Macias, L.P.N.  Note: Complains of left foot pain, has relief with rest and elevation.  Will continue to monitor patient.     The patient is Watcher - Medium risk of patient condition declining or worsening    Shift Goals  Patient Goals: Sleep well    Progress made toward(s) clinical / shift goals:  progressing

## 2021-07-05 NOTE — PROGRESS NOTES
Received bedside shift report from Haylee MCINTOSH RN regarding patient and assumed care. Patient awake, calm and stable, currently positioned in bed for comfort and safety; call light within reach. Denies pain or discomfort at this time. Will continue to monitor.    Statement Selected

## 2021-07-05 NOTE — CARE PLAN
Problem: Pain - Standard  Goal: Alleviation of pain or a reduction in pain to the patient’s comfort goal  Outcome: Progressing  Note: Patient able to perform regular activities this shift.  Pain control Fiorecet for HA this shift.  Pain management includes PRN pain meds as well as non-pharmacological measures such as emotional support, rest, and repositioning.       Problem: Fall Risk - Rehab  Goal: Patient will remain free from falls  Outcome: Progressing  Note: Patient had syncope episode while CNA was taking orthostatic BP in standing position. Pt was lowered to the bed by CNA and did not fall. Pt awoke and BP was 132/81 pt alert and oriented.

## 2021-07-05 NOTE — PROGRESS NOTES
"Rehab Progress Note     CC: addisons disease     Interval Events (Subjective)  Patient is seen in therapy gym. She is complaining of a sore mouth and throat, and being tired with low energy. She endorses spitting up \"white stuff\", no thrush seen. Mouth appears dry and erythematous. No thrush seen. Ordering rapid strep test today. Case discussed with Dr. Owen who is following as well. Nurse is also present and taking blood pressure. There is concern she is having falsely elevated BP due to inappropriately small BP cuff, and may be orthostatic. Recorded SBP today range from 55 to 169.     Objective:  VITAL SIGNS: BP (!) 88/56 Comment: RN present  Pulse 68   Temp 36.3 °C (97.4 °F) (Temporal)   Resp 18   Ht 1.626 m (5' 4\")   Wt 100 kg (220 lb 10.9 oz)   LMP  (LMP Unknown)   SpO2 93%   BMI 37.88 kg/m²   Gen: NAD  Mouth: dry mucus membranes, erythematous mouth. No white plaquing noted.   Psych: Mood & Affect appropriate  CV: RRR, No cyanosis  Resp: CTAB  Abd: NTND  Skin: No visible rashes or lesions  Neuro: Answers questions appropriately, Following commands     Recent Results (from the past 72 hour(s))   POCT glucose device results    Collection Time: 07/02/21  1:47 PM   Result Value Ref Range    Glucose - Accu-Ck 155 (H) 65 - 99 mg/dL   Comp Metabolic Panel    Collection Time: 07/03/21 11:10 AM   Result Value Ref Range    Sodium 138 135 - 145 mmol/L    Potassium 3.1 (L) 3.6 - 5.5 mmol/L    Chloride 102 96 - 112 mmol/L    Co2 22 20 - 33 mmol/L    Anion Gap 14.0 7.0 - 16.0    Glucose 94 65 - 99 mg/dL    Bun 12 8 - 22 mg/dL    Creatinine 0.68 0.50 - 1.40 mg/dL    Calcium 7.6 (L) 8.5 - 10.5 mg/dL    AST(SGOT) 103 (H) 12 - 45 U/L    ALT(SGPT) 142 (H) 2 - 50 U/L    Alkaline Phosphatase 289 (H) 30 - 99 U/L    Total Bilirubin 3.2 (H) 0.1 - 1.5 mg/dL    Albumin 2.1 (L) 3.2 - 4.9 g/dL    Total Protein 4.0 (L) 6.0 - 8.2 g/dL    Globulin 1.9 1.9 - 3.5 g/dL    A-G Ratio 1.1 g/dL   MAGNESIUM    Collection Time: 07/03/21 " 11:10 AM   Result Value Ref Range    Magnesium 1.3 (L) 1.5 - 2.5 mg/dL   PHOSPHORUS    Collection Time: 07/03/21 11:10 AM   Result Value Ref Range    Phosphorus 2.7 2.5 - 4.5 mg/dL   proBrain Natriuretic Peptide, NT    Collection Time: 07/03/21 11:10 AM   Result Value Ref Range    NT-proBNP 823 (H) 0 - 125 pg/mL   ESTIMATED GFR    Collection Time: 07/03/21 11:10 AM   Result Value Ref Range    GFR If African American >60 >60 mL/min/1.73 m 2    GFR If Non African American >60 >60 mL/min/1.73 m 2       Current Facility-Administered Medications   Medication Frequency   • Non Formulary Request 10 mg QHS   • furosemide (LASIX) tablet 40 mg Q DAY   • potassium chloride SA (Kdur) tablet 40 mEq DAILY   • senna-docusate (PERICOLACE or SENOKOT S) 8.6-50 MG per tablet 2 tablet BID PRN   • Eravacycline Dihydrochloride (XERAVA) 100 mg in  mL IVPB Q12HRS   • polyethylene glycol/lytes (MIRALAX) PACKET 1 Packet QDAY PRN   • magnesium hydroxide (MILK OF MAGNESIA) suspension 30 mL QDAY PRN   • bisacodyl (DULCOLAX) suppository 10 mg QDAY PRN   • ondansetron (ZOFRAN ODT) dispertab 4 mg 4X/DAY PRN   • ondansetron (ZOFRAN) syringe/vial injection 4 mg 4X/DAY PRN   • fludrocortisone (FLORINEF) tablet 0.1 mg QAM   • hydrocortisone (CORTEF) tablet 10 mg DAILY   • hydrocortisone (CORTEF) tablet 20 mg DAILY   • vitamin D (cholecalciferol) tablet 2,000 Units DAILY   • tizanidine (ZANAFLEX) tablet 4 mg QDAY PRN   • SUMAtriptan (IMITREX) tablet 50 mg Q2HRS PRN   • omeprazole (PRILOSEC) capsule 20 mg DAILY   • montelukast (SINGULAIR) tablet 10 mg Q EVENING   • liothyronine (CYTOMEL) tablet 25 mcg QHS   • levothyroxine (SYNTHROID) tablet 75 mcg AM ES   • gabapentin (NEURONTIN) capsule 400 mg BID   • DULoxetine (CYMBALTA) capsule 60 mg QHS   • colesevelam (WELCHOL) tablet 625 mg TID   • carvedilol (COREG) tablet 3.125 mg BID WITH MEALS   • calcitonin (salmon) (MIACALCIN) nasal spray 200 Units QHS   • apixaban (ELIQUIS) tablet 5 mg BID   •  amitriptyline (ELAVIL) tablet 10 mg DAILY   • sodium chloride (OCEAN) 0.65 % nasal spray 2 Spray PRN   • traZODone (DESYREL) tablet 50 mg QHS PRN   • mag hydrox-al hydrox-simeth (MAALOX PLUS ES or MYLANTA DS) suspension 20 mL Q2HRS PRN   • benzocaine-menthol (CEPACOL) lozenge 1 Lozenge Q2HRS PRN   • artificial tears ophthalmic solution 1 Drop PRN   • acetaminophen (Tylenol) tablet 650 mg Q4HRS PRN   • hydrALAZINE (APRESOLINE) tablet 25 mg Q8HRS PRN   • lidocaine (LIDODERM) 5 % 1 Patch DAILY       Orders Placed This Encounter   Procedures   • Diet Order Diet: Regular     Standing Status:   Standing     Number of Occurrences:   1     Order Specific Question:   Diet:     Answer:   Regular [1]       Assessment:  Active Hospital Problems    Diagnosis    • Hypomagnesemia    • Syncope    • Transaminitis    • History of pulmonary embolism    • Adrenal insufficiency (Bottineau's disease) (HCC)    • Hypertension    • Tachycardia    • Orthostatic hypotension    • Hypothyroidism    • Chronic systolic heart failure (HCC)    • Gastroesophageal reflux disease without esophagitis    • Depression        Medical Decision Making and Plan:  Patient is admitted to [facility] for ongoing PT, OT and/or SLP.  Continue medical management per primary team.      In addition:   Ordering step test today     Total time:  27minutes.  I spent greater than 50% of the time for patient care and coordination on this date, including unit/floor time, and face-to-face time with the patient as per assessment and plan above.      Yefri Beasley, DO   Physical Medicine and Rehabilitation

## 2021-07-06 ENCOUNTER — APPOINTMENT (OUTPATIENT)
Dept: ONCOLOGY | Facility: MEDICAL CENTER | Age: 56
End: 2021-07-06
Attending: NURSE PRACTITIONER
Payer: MEDICARE

## 2021-07-06 PROBLEM — E83.39 HYPOPHOSPHATEMIA: Status: ACTIVE | Noted: 2021-07-05

## 2021-07-06 PROBLEM — E83.119 HEMOCHROMATOSIS: Status: ACTIVE | Noted: 2021-06-29

## 2021-07-06 LAB
ALBUMIN SERPL BCP-MCNC: 1.9 G/DL (ref 3.2–4.9)
ALBUMIN/GLOB SERPL: 1.2 G/DL
ALP SERPL-CCNC: 300 U/L (ref 30–99)
ALT SERPL-CCNC: 113 U/L (ref 2–50)
ANION GAP SERPL CALC-SCNC: 10 MMOL/L (ref 7–16)
AST SERPL-CCNC: 102 U/L (ref 12–45)
BILIRUB SERPL-MCNC: 4.6 MG/DL (ref 0.1–1.5)
BUN SERPL-MCNC: 8 MG/DL (ref 8–22)
CALCIUM SERPL-MCNC: 7.6 MG/DL (ref 8.5–10.5)
CHLORIDE SERPL-SCNC: 102 MMOL/L (ref 96–112)
CO2 SERPL-SCNC: 29 MMOL/L (ref 20–33)
CREAT SERPL-MCNC: 0.57 MG/DL (ref 0.5–1.4)
GLOBULIN SER CALC-MCNC: 1.6 G/DL (ref 1.9–3.5)
GLUCOSE SERPL-MCNC: 102 MG/DL (ref 65–99)
MAGNESIUM SERPL-MCNC: 1.6 MG/DL (ref 1.5–2.5)
PHOSPHATE SERPL-MCNC: 2.4 MG/DL (ref 2.5–4.5)
POTASSIUM SERPL-SCNC: 4 MMOL/L (ref 3.6–5.5)
PROT SERPL-MCNC: 3.5 G/DL (ref 6–8.2)
SODIUM SERPL-SCNC: 141 MMOL/L (ref 135–145)

## 2021-07-06 PROCEDURE — 97530 THERAPEUTIC ACTIVITIES: CPT

## 2021-07-06 PROCEDURE — 99232 SBSQ HOSP IP/OBS MODERATE 35: CPT | Performed by: PHYSICAL MEDICINE & REHABILITATION

## 2021-07-06 PROCEDURE — A9270 NON-COVERED ITEM OR SERVICE: HCPCS | Performed by: PHYSICAL MEDICINE & REHABILITATION

## 2021-07-06 PROCEDURE — 97535 SELF CARE MNGMENT TRAINING: CPT

## 2021-07-06 PROCEDURE — 99232 SBSQ HOSP IP/OBS MODERATE 35: CPT | Performed by: HOSPITALIST

## 2021-07-06 PROCEDURE — 83735 ASSAY OF MAGNESIUM: CPT

## 2021-07-06 PROCEDURE — 700102 HCHG RX REV CODE 250 W/ 637 OVERRIDE(OP): Performed by: HOSPITALIST

## 2021-07-06 PROCEDURE — 700111 HCHG RX REV CODE 636 W/ 250 OVERRIDE (IP)

## 2021-07-06 PROCEDURE — 97110 THERAPEUTIC EXERCISES: CPT

## 2021-07-06 PROCEDURE — A9270 NON-COVERED ITEM OR SERVICE: HCPCS | Performed by: HOSPITALIST

## 2021-07-06 PROCEDURE — 80053 COMPREHEN METABOLIC PANEL: CPT

## 2021-07-06 PROCEDURE — 84100 ASSAY OF PHOSPHORUS: CPT

## 2021-07-06 PROCEDURE — 770010 HCHG ROOM/CARE - REHAB SEMI PRIVAT*

## 2021-07-06 PROCEDURE — 700105 HCHG RX REV CODE 258

## 2021-07-06 PROCEDURE — 700102 HCHG RX REV CODE 250 W/ 637 OVERRIDE(OP): Performed by: PHYSICAL MEDICINE & REHABILITATION

## 2021-07-06 PROCEDURE — 700101 HCHG RX REV CODE 250: Performed by: PHYSICAL MEDICINE & REHABILITATION

## 2021-07-06 RX ORDER — MINERAL OIL/HYDROPHIL PETROLAT
OINTMENT (GRAM) TOPICAL 2 TIMES DAILY
Status: DISCONTINUED | OUTPATIENT
Start: 2021-07-06 | End: 2021-07-12 | Stop reason: HOSPADM

## 2021-07-06 RX ORDER — CARVEDILOL 3.12 MG/1
3.12 TABLET ORAL DAILY
Status: DISCONTINUED | OUTPATIENT
Start: 2021-07-07 | End: 2021-07-07

## 2021-07-06 RX ADMIN — HYDROCORTISONE 10 MG: 10 TABLET ORAL at 15:20

## 2021-07-06 RX ADMIN — COLESEVELAM HYDROCHLORIDE 625 MG: 625 TABLET, FILM COATED ORAL at 21:33

## 2021-07-06 RX ADMIN — CHOLECALCIFEROL TAB 25 MCG (1000 UNIT) 2000 UNITS: 25 TAB at 08:18

## 2021-07-06 RX ADMIN — DULOXETINE 60 MG: 30 CAPSULE, DELAYED RELEASE ORAL at 21:33

## 2021-07-06 RX ADMIN — APIXABAN 5 MG: 5 TABLET, FILM COATED ORAL at 08:18

## 2021-07-06 RX ADMIN — LEVOTHYROXINE SODIUM 75 MCG: 75 TABLET ORAL at 05:08

## 2021-07-06 RX ADMIN — SUMATRIPTAN SUCCINATE 50 MG: 25 TABLET ORAL at 08:18

## 2021-07-06 RX ADMIN — FUROSEMIDE 40 MG: 40 TABLET ORAL at 05:08

## 2021-07-06 RX ADMIN — POTASSIUM CHLORIDE 40 MEQ: 1500 TABLET, EXTENDED RELEASE ORAL at 08:18

## 2021-07-06 RX ADMIN — COLESEVELAM HYDROCHLORIDE 625 MG: 625 TABLET, FILM COATED ORAL at 08:18

## 2021-07-06 RX ADMIN — LIDOCAINE 1 PATCH: 50 PATCH TOPICAL at 08:18

## 2021-07-06 RX ADMIN — BENZOCAINE AND MENTHOL 1 LOZENGE: 15; 3.6 LOZENGE ORAL at 01:41

## 2021-07-06 RX ADMIN — DIBASIC SODIUM PHOSPHATE, MONOBASIC POTASSIUM PHOSPHATE AND MONOBASIC SODIUM PHOSPHATE 500 MG: 852; 155; 130 TABLET ORAL at 15:20

## 2021-07-06 RX ADMIN — MONTELUKAST 10 MG: 10 TABLET, FILM COATED ORAL at 21:33

## 2021-07-06 RX ADMIN — FLUDROCORTISONE ACETATE 0.1 MG: 0.1 TABLET ORAL at 05:08

## 2021-07-06 RX ADMIN — DIBASIC SODIUM PHOSPHATE, MONOBASIC POTASSIUM PHOSPHATE AND MONOBASIC SODIUM PHOSPHATE 500 MG: 852; 155; 130 TABLET ORAL at 11:39

## 2021-07-06 RX ADMIN — GABAPENTIN 400 MG: 400 CAPSULE ORAL at 21:33

## 2021-07-06 RX ADMIN — CALCITONIN SALMON 200 UNITS: 200 SPRAY, METERED NASAL at 21:36

## 2021-07-06 RX ADMIN — OMEPRAZOLE 20 MG: 20 CAPSULE, DELAYED RELEASE ORAL at 08:18

## 2021-07-06 RX ADMIN — COLESEVELAM HYDROCHLORIDE 625 MG: 625 TABLET, FILM COATED ORAL at 15:20

## 2021-07-06 RX ADMIN — GABAPENTIN 400 MG: 400 CAPSULE ORAL at 08:18

## 2021-07-06 RX ADMIN — AMITRIPTYLINE HYDROCHLORIDE 10 MG: 10 TABLET, FILM COATED ORAL at 08:18

## 2021-07-06 RX ADMIN — APIXABAN 5 MG: 5 TABLET, FILM COATED ORAL at 21:33

## 2021-07-06 RX ADMIN — ACETAMINOPHEN 650 MG: 325 TABLET, FILM COATED ORAL at 08:18

## 2021-07-06 RX ADMIN — DIBASIC SODIUM PHOSPHATE, MONOBASIC POTASSIUM PHOSPHATE AND MONOBASIC SODIUM PHOSPHATE 500 MG: 852; 155; 130 TABLET ORAL at 21:33

## 2021-07-06 RX ADMIN — HYDROCORTISONE 20 MG: 10 TABLET ORAL at 08:18

## 2021-07-06 RX ADMIN — LIOTHYRONINE SODIUM 25 MCG: 25 TABLET ORAL at 21:33

## 2021-07-06 ASSESSMENT — ACTIVITIES OF DAILY LIVING (ADL)
TOILETING_LEVEL_OF_ASSIST_DESCRIPTION: GRAB BAR;INCREASED TIME;SET-UP OF EQUIPMENT;SUPERVISION FOR SAFETY
BED_CHAIR_WHEELCHAIR_TRANSFER_DESCRIPTION: ADAPTIVE EQUIPMENT;INCREASED TIME;SET-UP OF EQUIPMENT;SUPERVISION FOR SAFETY;VERBAL CUEING
BED_CHAIR_WHEELCHAIR_TRANSFER_DESCRIPTION: INITIAL PREPARATION FOR TASK;SET-UP OF EQUIPMENT;SUPERVISION FOR SAFETY;VERBAL CUEING
TOILET_TRANSFER_DESCRIPTION: GRAB BAR;SET-UP OF EQUIPMENT;SUPERVISION FOR SAFETY;VERBAL CUEING
TUB_SHOWER_TRANSFER_DESCRIPTION: GRAB BAR;SHOWER BENCH;SET-UP OF EQUIPMENT;SUPERVISION FOR SAFETY;VERBAL CUEING

## 2021-07-06 ASSESSMENT — ENCOUNTER SYMPTOMS
BRUISES/BLEEDS EASILY: 0
MUSCULOSKELETAL NEGATIVE: 1
PALPITATIONS: 0
POLYDIPSIA: 0
ABDOMINAL PAIN: 0
COUGH: 0
CHILLS: 0
EYES NEGATIVE: 1
VOMITING: 0
NAUSEA: 0
SHORTNESS OF BREATH: 0
FEVER: 0

## 2021-07-06 ASSESSMENT — GAIT ASSESSMENTS
DISTANCE (FEET): 0
GAIT LEVEL OF ASSIST: UNABLE TO PARTICIPATE
ASSISTIVE DEVICE: FRONT WHEEL WALKER
GAIT LEVEL OF ASSIST: UNABLE TO PARTICIPATE
DISTANCE (FEET): 0

## 2021-07-06 ASSESSMENT — PAIN DESCRIPTION - PAIN TYPE: TYPE: ACUTE PAIN

## 2021-07-06 NOTE — PROGRESS NOTES
Received bedside shift report from Roberta COLON RN regarding patient and assumed care. Patient awake, calm and stable, currently positioned in bed for comfort and safety; call light within reach. Denies pain or discomfort at this time. Will continue to monitor.

## 2021-07-06 NOTE — THERAPY
Occupational Therapy  Daily Treatment     Patient Name: Donna Isaac  Age:  56 y.o., Sex:  female  Medical Record #: [id number]  Today's Date: 7/6/2021     Precautions  Precautions: Fall Risk, Other (See Comments)  Comments: Hx of syncopal episodes, Orthostatic Hypotension, abdominal binder OOB, R ankle brace, [place] (deaf R ear)    Safety   ADL Safety : Requires Physical Assist for Safety, Requires Supervision for Safety  Bathroom Safety: Requires Supervision for Safety, Requires Physical Assist for Safety  Comments: see below functional levels for ADL performance details.     Subjective       Objective       07/06/21 1101   Precautions   Precautions Fall Risk;Other (See Comments)   Comments Hx of syncopal episodes, Orthostatic Hypotension, abdominal binder OOB, R ankle brace, [place] (deaf R ear)   Safety    ADL Safety  Requires Physical Assist for Safety;Requires Supervision for Safety   Bathroom Safety Requires Supervision for Safety;Requires Physical Assist for Safety   Vitals   O2 Delivery Device None - Room Air   Non Verbal Descriptors   Non Verbal Scale  Calm   Cognition    Level of Consciousness Alert   Sleep/Wake Cycle   Sleep & Rest Awake   Interdisciplinary Plan of Care Collaboration   IDT Collaboration with  Family / Caregiver;Certified Nursing Assistant   Patient Position at End of Therapy Seated;Self Releasing Lap Belt Applied;Call Light within Reach;Tray Table within Reach;Phone within Reach;Family / Friend in Room   Collaboration Comments Patient/Family () training - review of ADL's, functional levels, NRG conservation, orthostatic hypotension, use of AE/DME,  Therapist reviewed bathroom transfers use of gait belt, attentions to signs of orthostatic hypotension, transfers, use of gait belt/reachers/Tub Transfer Bench/Bedside commode.  Patient undecided as to return to temporary living situation or to move to daughter's home in [place], [place] until they can move into their new home in August.  If  patient and  decides to move back to their friend's 2 story home for the next few weeks - therapist is recommending relocating bed to ground level + use of BSC.  Recommend Close Supervision secondary syncopal episodes and orthostatic hypotension for transfers and standing ADL's + shower   OT Total Time Spent   OT Individual Total Time Spent (Mins) 30   OT Charge Group   OT Therapy Activity 2       Assessment    Pt was alert and cooperative w/ tx.  Tx emphasis on patient/family () training - see notes above for details.  Strengths: Pleasant and cooperative, Willingly participates in therapeutic activities  Barriers: Decreased endurance, Fatigue, Generalized weakness, Hearing impairment, Orthostatic hypotension, Impaired activity tolerance, Impaired balance, Limited mobility    Plan    Cooking and/or laundry task with combination of standing and sitting, ADLs, IADLs, functional mobility, AE education, strength/endurance building, balance.     Passport items to be completed:  Passport items to be completed:  Perform bathroom transfers, complete dressing, complete feeding, get ready for the day, prepare a simple meal, participate in household tasks, adapt home for safety needs, demonstrate home exercise program, complete caregiver training     Occupational Therapy Goals (Active)     Problem: Dressing     Dates: Start: 06/29/21       Goal: STG-Within one week, patient will dress LB     Dates: Start: 06/29/21       Goal Note filed on 07/01/21 1139 by Dennis Alcantar MS,OTR/L     Max assist to manage LB clothing - Total assist for SILVERIO hose and R ankle brace, Max assist to don shoes, Min assist to manage briefs               Problem: Functional Transfers     Dates: Start: 06/29/21       Goal: STG-Within one week, patient will transfer to toilet     Dates: Start: 06/29/21       Goal Note filed on 07/01/21 1139 by Dennis Alcantar MS,OTR/L     CGA via DME               Problem: OT Long Term Goals     Dates: Start:  06/29/21       Goal: LTG-By discharge, patient will complete basic self care tasks     Dates: Start: 06/29/21       Goal Note filed on 06/29/21 0823 by Dennis Alcantar MS,OTR/L     1) Individualized Goal:  Mod I for BADL's via AE/DME PRN  2) Interventions:  OT Self Care/ADL, OT Neuro Re-Ed/Balance, OT Therapeutic Activity, OT Evaluation, and OT Therapeutic Exercise            Goal: LTG-By discharge, patient will perform bathroom transfers     Dates: Start: 06/29/21       Goal Note filed on 06/29/21 0823 by Dennis Alcantar MS,OTR/L     1) Individualized Goal:  Mod I for bathroom transfers via DME PRN  2) Interventions: OT Self Care/ADL, OT Neuro Re-Ed/Balance, OT Therapeutic Activity, OT Evaluation, and OT Therapeutic Exercise               Problem: Toileting     Dates: Start: 06/29/21       Goal: STG-Within one week, patient will complete toileting tasks     Dates: Start: 06/29/21       Goal Note filed on 07/01/21 1139 by Dennis Alcantar MS,OTR/L     CGA/Min assist

## 2021-07-06 NOTE — CARE PLAN
The patient is Stable - Low risk of patient condition declining or worsening    Shift Goals  Patient Goals: Sleep well    Progress made toward(s) clinical / shift goals:        Problem: Knowledge Deficit - Standard  Goal: Patient and family/care givers will demonstrate understanding of plan of care, disease process/condition, diagnostic tests and medications  Outcome: Progressing     Problem: Pain - Standard  Goal: Alleviation of pain or a reduction in pain to the patient’s comfort goal  Outcome: Progressing

## 2021-07-06 NOTE — WOUND TEAM
[facility]  Inpatient Services  Initial Wound and Skin Care Evaluation    Admission Date: 6/28/2021     Last order of IP CONSULT TO WOUND CARE was found on 7/6/2021 from Hospital Encounter on 6/26/2021     HPI, PMH, SH: Reviewed    Past Surgical History:   Procedure Laterality Date   • HARDWARE REMOVAL ORTHO Right 3/17/2021    Procedure: REMOVAL, HARDWARE - SYNDESMOTIC SCREW OF ANKLE.;  Surgeon: William Srinivasan M.D.;  Location: SURGERY [facility];  Service: Orthopedics   • ANKLE ORIF Right 1/27/2021    Procedure: ORIF, ANKLE;  Surgeon: William Srinivasan M.D.;  Location: SURGERY [facility];  Service: Orthopedics   • ESOPHAGEAL MOTILITY OR MANOMETRY N/A 8/19/2020    Procedure: MOTILITY STUDY, ESOPHAGUS, USING MANOMETRY;  Surgeon: Jamel Oden M.D.;  Location: ENDOSCOPY [facility];  Service: Gastroenterology   • PB FUSION FOOT BONES,TRIPLE Right 7/14/2020    Procedure: FUSION, JOINT, HINDFOOT, TRIPLE - WITH POSSIBLE GASTROC RECESSION;  Surgeon: Wm Librado Mercedes M.D.;  Location: SURGERY [facility];  Service: Orthopedics   • CYST EXCISION  05/2020    right frontal tempral sinus   • SHOULDER DECOMPRESSION ARTHROSCOPIC Left 2/19/2019    Procedure: SHOULDER DECOMPRESSION ARTHROSCOPIC - SUBACROMIAL;  Surgeon: Camila Elkins M.D.;  Location: [facility];  Service: Orthopedics   • CLAVICLE DISTAL EXCISION Left 2/19/2019    Procedure: CLAVICLE DISTAL EXCISION;  Surgeon: Camila Elkins M.D.;  Location: [facility];  Service: Orthopedics   • SHOULDER ARTHROSCOPY W/ BICIPITAL TENODESIS REPAIR Left 2/19/2019    Procedure: SHOULDER ARTHROSCOPY W/ BICIPITAL TENODESIS REPAIR;  Surgeon: Camila Elkins M.D.;  Location: [facility];  Service: Orthopedics   • GASTROSCOPY N/A 2/7/2019    Procedure: GASTROSCOPY- DIALATION AND BIOPSY;  Surgeon: Hola Veliz M.D.;  Location: SURGERY [facility];  Service: Gastroenterology   • ABDOMINAL  "EXPLORATION  2002   • GASTRIC BYPASS LAPAROSCOPIC  1999   • HYSTERECTOMY, TOTAL ABDOMINAL  1995   • MANDIBLE FRACTURE ORIF  1983   • APPENDECTOMY  1974   • GYN SURGERY     • OTHER ORTHOPEDIC SURGERY       Social History     Tobacco Use   • Smoking status: Never Smoker   • Smokeless tobacco: Never Used   Substance Use Topics   • Alcohol use: Yes     No chief complaint on file.    Diagnosis: Orthostatic hypotension [I95.1]    Unit where seen by Wound Team: 23/02     WOUND CONSULT/FOLLOW UP RELATED TO:  Bilateral thumb, second and third fingertips     WOUND HISTORY:  Patient with cracked skin to the fingertips, patient states \"I washed my hands multiple times and now they are cracked and keep opening up\"    WOUND ASSESSMENT:     Wound 07/06/21 Partial Thickness Wound Finger, Thumb;Finger, 2nd;Finger, 3rd Bilateral (Active)   Wound Image   07/06/21 1600   Site Assessment Red;[place] 07/06/21 1600   Periwound Assessment Red 07/06/21 1600   Margins Attached edges 07/06/21 1600   Closure Secondary intention 07/06/21 1600   Drainage Amount None 07/06/21 1600   Treatments Cleansed;Site care 07/06/21 1600   Wound Cleansing Approved Wound Cleanser 07/06/21 1600   Dressing Options Hydrocolloid Thin 07/06/21 1600   Dressing Changed New 07/06/21 1600   Dressing Status Clean;Dry;Intact 07/06/21 1600   Dressing Change/Treatment Frequency Every 72 hrs, and As Needed 07/06/21 1600   NEXT Dressing Change/Treatment Date 07/09/21 07/06/21 1600   Exposed Structures None 07/06/21 1600   WOUND NURSE ONLY - Time Spent with Patient (mins) 30 07/06/21 1600        Vascular:    TANNER:   No results found.    Lab Values:    Lab Results   Component Value Date/Time    WBC 7.6 06/29/2021 06:06 AM    RBC 4.35 06/29/2021 06:06 AM    HEMOGLOBIN 13.3 06/29/2021 06:06 AM    HEMATOCRIT 40.7 06/29/2021 06:06 AM    CREACTPROT 0.64 06/16/2021 03:05 AM    SEDRATEWES 8 05/24/2021 11:00 AM    HBA1C 6.0 (H) 06/29/2021 06:06 AM        Culture Results show:  Recent " Results (from the past 720 hour(s))   CULTURE WOUND W/ GRAM STAIN    Collection Time: 06/07/21 11:00 AM    Specimen: Respirate   Result Value Ref Range    Significant Indicator POS (POS)     Source RESP     Site left maxillary sinus contents     Culture Result - (A)     Gram Stain Result Few WBCs.  Rare Gram positive cocci.       Culture Result (A)      Methicillin Resistant Staphylococcus aureus  Moderate growth         Susceptibility    Methicillin resistant staphylococcus aureus - DOYLE     Azithromycin >4 Resistant mcg/mL     Clindamycin <=0.25 Sensitive mcg/mL     Cefazolin <=8 Resistant mcg/mL     Cefepime 16 Resistant mcg/mL     Ceftaroline <=0.5 Sensitive mcg/mL     Ampicillin/sulbactam 16/8 Resistant mcg/mL     Erythromycin >4 Resistant mcg/mL     Vancomycin 1 Sensitive mcg/mL     Oxacillin >2 Resistant mcg/mL     Trimeth/Sulfa <=0.5/9.5 Sensitive mcg/mL     Tetracycline <=4 Sensitive mcg/mL       Pain Level/Medicated:  Patient tolerated well       INTERVENTIONS BY WOUND TEAM:  Chart and images reviewed. Discussed with bedside RN. All areas of concern (based on picture review, LDA review and discussion with bedside RN) have been thoroughly assessed. Documentation of areas based on significant findings. This RN in to assess patient. Performed standard wound care which includes appropriate positioning, dressing removal and non-selective debridement. Pictures and measurements obtained weekly if/when required.  Preparation for Dressing removal: NA  Cleansed with:  NS and gauze.  Sharp debridement: NA  Neela wound: Cleansed with NS  Primary Dressing: Hydrocolloid thin  Secondary (Outer) Dressing: NA    Interdisciplinary consultation: Patient, Bedside RN, Wound care RN (Leatha)    EVALUATION / RATIONALE FOR TREATMENT:  Most Recent Date:  7/6/21: Red. Cracked fingertips to the bilateral thumb, second and third fingertips.  Patient given options of Aquaphor, hydrocolloid thin or gauze and tape.  Patient opted for  hydrocolloid.  Hydrocolloid thin applied to the fingertips to help protect and autolytically debride skin.  Patient educated to let the nurse know if she would like to try another option.     Goals: Steady decrease in wound area and depth weekly.    WOUND TEAM PLAN OF CARE ([X] for frequency of wound follow up,):   Nursing to follow orders written for wound care. Contact wound team if area fails to progress, deteriorates or with any questions/concerns  Dressing changes by wound team:                   Follow up 3 times weekly:                NPWT change 3 times weekly:     Follow up 1-2 times weekly:      Follow up Bi-Monthly:                   Follow up as needed:   X  Other (explain):     NURSING PLAN OF CARE ORDERS (X):  Dressing changes: See Dressing Care orders: X  Skin care: See Skin Care orders:   RN Prevention Protocol:   Rectal tube care: See Rectal Tube Care orders:   Other orders:    RSKIN:   CURRENTLY IN PLACE (X), APPLIED THIS VISIT (A), ORDERED (O):   Q shift Matthew:  X  Q shift pressure point assessments:  X    Surface/Positioning   Pressure redistribution mattress            Low Airloss          Bariatric foam      Bariatric CHEVY     Waffle cushion        Waffle Overlay          Reposition q 2 hours      TAPs Turning system     Z Jack Pillow     Offloading/Redistribution   Sacral Mepilex (Silicone dressing)     Heel Mepilex (Silicone dressing)         Heel float boots (Prevalon boot)             Float Heels off Bed with Pillows           Respiratory   Silicone O2 tubing         Gray Foam Ear protectors     Cannula fixation Device (Tender )          High flow offloading Clip    Elastic head band offloading device      Anchorfast                                                         Trach with Optifoam split foam             Containment/Moisture Prevention     Rectal tube or BMS    Purwick/Condom Cath        Perez Catheter    Barrier wipes           Barrier paste       Antifungal tx      Interdry         Mobilization       Up to chair        Ambulate      PT/OT      Nutrition       Dietician        Diabetes Education      PO     TF     TPN     NPO   # days     Other        Anticipated discharge plans: No advanced wound care needs anticipated at this time.  LTACH:        SNF/Rehab:                  Home Health Care:           Outpatient Wound Center:            Self/Family Care:        Other:

## 2021-07-06 NOTE — THERAPY
Physical Therapy   Daily Treatment     Patient Name: Donna Isaac  Age:  56 y.o., Sex:  female  Medical Record #: [id number]  Today's Date: 7/6/2021     Precautions  Precautions: Fall Risk, Other (See Comments)  Comments: Hx of syncopal episodes, Orthostatic Hypotension, abdominal binder OOB, R ankle brace, [place] (deaf R ear)    Subjective    Patient agreeable to PT.     Objective       07/06/21 1031   Vitals   Pulse 84   Pulse Oximetry 99 %   O2 (LPM) 0   O2 Delivery Device None - Room Air   Vitals Comments Hypotension during session limited attempt to pursue ambulation; RN notified and re-checked manually; OT notified.   Gait Functional Level of Assist    Gait Level Of Assist Unable to Participate  (due to Hypotension during last 15 min of session)   Assistive Device Front Wheel Walker  (Gait belt with/without FWW planned)   Distance (Feet) 0   # of Times Distance was Traveled 0   Deviation   (Unable due to Hypotension during last 15 min of session)   Stairs Functional Level of Assist   Level of Assist with Stairs Unable to Participate   # of Stairs Climbed 0   Stairs Description Safety concerns  (Syncopal episodes; Impaired safety)   Transfer Functional Level of Assist   Bed, Chair, Wheelchair Transfer Contact Guard Assist  (CGA-SBA)   Bed Chair Wheelchair Transfer Description Adaptive equipment;Increased time;Set-up of equipment;Supervision for safety;Verbal cueing   Bed Mobility    Supine to Sit Stand by Assist   Sit to Supine Stand by Assist   Sit to Stand Contact Guard Assist   Scooting Supervised   Rolling Supervised   Interdisciplinary Plan of Care Collaboration   IDT Collaboration with  Family / Caregiver;Nursing;Occupational Therapist   Patient Position at End of Therapy Seated;Self Releasing Lap Belt Applied;Other (Comments)  (at nursing station with RN, SO, and arrival of OT.)   Collaboration Comments Pt's SO present for family training today; reviewed CLOF and medical limitations; notified RN of  hypotension, and RN monitoring at end of session; notified OT.   PT Total Time Spent   PT Individual Total Time Spent (Mins) 30   PT Charge Group   PT Therapeutic Activities 2       Assessment    Patient has impaired CLOF due to medical limitations with hypotension, improved with second manual reading with RN; great social support and understanding of safety with transfers, syncope, medical status.    Strengths: Able to follow instructions, Motivated for self care and independence, Independent prior level of function, Good insight into deficits/needs, Willingly participates in therapeutic activities, Pleasant and cooperative  Barriers: Decreased endurance, Generalized weakness, Orthostatic hypotension, Impaired balance, Limited mobility    Plan    Ambulation, Ther ex for strengthening and endurance, Standing tolerance and standing balance, Education.  D/C was scheduled for Thursday 7/8/21 but being updated to likely 7/12/21.    Passport items to be completed:  Passport items to be completed:  Get in/out of bed safely, in/out of a vehicle, safely use mobility device, walk or wheel around home/community, navigate up and down stairs, show how to get up/down from the ground, ensure home is accessible, demonstrate HEP, complete caregiver training    Physical Therapy Problems (Active)     Problem: Mobility     Dates: Start: 07/01/21       Goal: STG-Within one week, patient will ambulate household distance of 150 feet with gait belt and SBA.     Dates: Start: 07/01/21          Goal: STG-Within one week, patient will ambulate up/down flight of stairs with B railings, gait belt, and CGA.     Dates: Start: 07/01/21             Problem: Mobility Transfers     Dates: Start: 06/29/21       Goal: STG-Within one week, patient will transfer bed to chair [facility] with LRD In order to maximize self mobility     Dates: Start: 06/29/21       Goal Note filed on 07/01/21 1131 by Luis Morales, PT     CGA with transfers                Problem: PT-Long Term Goals     Dates: Start: 06/29/21       Goal: LTG-By discharge, patient will ambulate 150 ft With LRD at mod I in order to progress towards PLOF      Dates: Start: 06/29/21          Goal: LTG-By discharge, patient will transfer one surface to another At mod I with LRD In order to maximize self mobility     Dates: Start: 06/29/21          Goal: LTG-By discharge, patient will ambulate up/down flight of stairs With single HR at SP in order to enter/exit home safely     Dates: Start: 06/29/21

## 2021-07-06 NOTE — THERAPY
"Occupational Therapy  Daily Treatment     Patient Name: Donna Isaac  Age:  56 y.o., Sex:  female  Medical Record #: [id number]  Today's Date: 7/6/2021     Precautions  Precautions: Fall Risk, Other (See Comments)  Comments: Hx of syncopal episodes, Orthostatic Hypotension, abdominal binder OOB, R ankle brace, [place] (deaf R ear)    Safety   ADL Safety : Requires Supervision for Safety  Bathroom Safety: Requires Supervision for Safety, Requires Physical Assist for Safety, Impaired Insight into Safety, Impaired  Comments: see below functional levels for ADL performance details.     Subjective    \"Good morning\"  Pt awake in bed and agreeable to OT tx.     Objective       07/06/21 0701   Precautions   Precautions Fall Risk;Other (See Comments)   Comments Hx of syncopal episodes, Orthostatic Hypotension, abdominal binder OOB, R ankle brace, [place] (deaf R ear)   Vitals   O2 Delivery Device None - Room Air   Pain   Intervention Declines   Pain 0 - 10 Group   Therapist Pain Assessment 0   Non Verbal Descriptors   Non Verbal Scale  Calm   Cognition    Level of Consciousness Alert   Sleep/Wake Cycle   Sleep & Rest Awake   Functional Level of Assist   Eating Modified Independent   Eating Description Increased time   Grooming Modified Independent;Seated   Grooming Description Seated in wheelchair at sink;Increased time   Bathing Moderate Assist   Bathing Description Grab bar;Hand held shower;Long handled bath tool;Tub bench;Assit with back;Assit wtih lower extremities;Increased time;Initial preparation for task;Set-up of equipment;Supervision for safety;Verbal cueing   Upper Body Dressing Minimal Assist   Upper Body Dressing Description Assist with pulling shirt over head;Set-up of equipment;Supervision for safety;Verbal cueing   Lower Body Dressing Minimal Assist   Lower Body Dressing Description Grab bar;Reacher;Increased time;Set-up of equipment;Supervision for safety;Verbal cueing;Dressing stick;Initial preparation for " task   Toileting Stand by Assist   Toileting Description Grab bar;Increased time;Set-up of equipment;Supervision for safety   Bed, Chair, Wheelchair Transfer Contact Guard Assist   Bed Chair Wheelchair Transfer Description Initial preparation for task;Set-up of equipment;Supervision for safety;Verbal cueing   Toilet Transfers Contact Guard Assist   Toilet Transfer Description Grab bar;Set-up of equipment;Supervision for safety;Verbal cueing   Tub / Shower Transfers Contact Guard Assist   Tub Shower Transfer Description Grab bar;Shower bench;Set-up of equipment;Supervision for safety;Verbal cueing   Comprehension Modified Independent   Comprehension Description Hearing aids/amplifiers   Expression Independent   Problem Solving Modified Independent   Problem Solving Description Increased time;Therapy schedule   Memory Modified Independent   Memory Description Therapy schedule   Bed Mobility    Supine to Sit Supervised   Interdisciplinary Plan of Care Collaboration   IDT Collaboration with  Certified Nursing Assistant;Nursing   Patient Position at End of Therapy In Bed;Call Light within Reach;Tray Table within Reach;Phone within Reach   Collaboration Comments CLOF, vitals   OT Total Time Spent   OT Individual Total Time Spent (Mins) 60   OT Charge Group   OT Self Care / ADL 4       Assessment    Pt was alert and cooperative w/ tx.  Tx emphasis on ADL's, use of AE/DME at wc level.  CNA report pt fainted yesterday evening and guided to bed.  Refer to notes above for details.  Strengths: Pleasant and cooperative, Willingly participates in therapeutic activities  Barriers: Decreased endurance, Fatigue, Generalized weakness, Hearing impairment, Orthostatic hypotension, Impaired activity tolerance, Impaired balance, Limited mobility    Plan    Cooking and/or laundry task with combination of standing and sitting, ADLs, IADLs, functional mobility, AE education, strength/endurance building, balance.     Passport items to be  completed:  Passport items to be completed:  Perform bathroom transfers, complete dressing, complete feeding, get ready for the day, prepare a simple meal, participate in household tasks, adapt home for safety needs, demonstrate home exercise program, complete caregiver training     Occupational Therapy Goals (Active)     Problem: Dressing     Dates: Start: 06/29/21       Goal: STG-Within one week, patient will dress LB     Dates: Start: 06/29/21       Goal Note filed on 07/01/21 1139 by Dennis Alcantar MS,OTR/L     Max assist to manage LB clothing - Total assist for SILVERIO hose and R ankle brace, Max assist to don shoes, Min assist to manage briefs               Problem: Functional Transfers     Dates: Start: 06/29/21       Goal: STG-Within one week, patient will transfer to toilet     Dates: Start: 06/29/21       Goal Note filed on 07/01/21 1139 by Dennis Alcantar MS,OTR/L     CGA via DME               Problem: OT Long Term Goals     Dates: Start: 06/29/21       Goal: LTG-By discharge, patient will complete basic self care tasks     Dates: Start: 06/29/21       Goal Note filed on 06/29/21 0823 by Dennis Alcantar MS,OTR/L     1) Individualized Goal:  Mod I for BADL's via AE/DME PRN  2) Interventions:  OT Self Care/ADL, OT Neuro Re-Ed/Balance, OT Therapeutic Activity, OT Evaluation, and OT Therapeutic Exercise            Goal: LTG-By discharge, patient will perform bathroom transfers     Dates: Start: 06/29/21       Goal Note filed on 06/29/21 0823 by Dennis Alcantar MS,OTR/L     1) Individualized Goal:  Mod I for bathroom transfers via DME PRN  2) Interventions: OT Self Care/ADL, OT Neuro Re-Ed/Balance, OT Therapeutic Activity, OT Evaluation, and OT Therapeutic Exercise               Problem: Toileting     Dates: Start: 06/29/21       Goal: STG-Within one week, patient will complete toileting tasks     Dates: Start: 06/29/21       Goal Note filed on 07/01/21 1139 by Dennis Alcantar MS,OTR/L     CGA/Min assist

## 2021-07-06 NOTE — THERAPY
Physical Therapy   Daily Treatment     Patient Name: Donna Isaac  Age:  56 y.o., Sex:  female  Medical Record #: [id number]  Today's Date: 7/6/2021     Precautions  Precautions: Fall Risk, Other (See Comments)  Comments: Hx of syncopal episodes, Orthostatic Hypotension, abdominal binder OOB, R ankle brace, [place] (deaf R ear)    Subjective    Patient agreeable to PT.     Objective       07/06/21 1231   Gait Functional Level of Assist    Gait Level Of Assist Unable to Participate   Distance (Feet) 0   # of Times Distance was Traveled 0   Deviation   (Safety concerns)   Standing Lower Body Exercises   Standing Lower Body Exercises Yes   Other Exercises Standing tolerance for 53-62 second reps without syncope for 5 reps in // bars; gait belt donned; CGA but SO present for additional A prn and discussion/education.  Seated breaks x3-4 min between reps.   Bed Mobility    Supine to Sit Stand by Assist   Sit to Supine Stand by Assist   Sit to Stand Contact Guard Assist   Scooting Supervised   Rolling Supervised   Interdisciplinary Plan of Care Collaboration   IDT Collaboration with  Family / Caregiver;Nursing;Physician;Therapy Tech   Patient Position at End of Therapy In Bed;Call Light within Reach;Tray Table within Reach;Phone within Reach;Family / Friend in Room   Collaboration Comments MD moving d/c date to about 7/12/21 at this time; notified therapy scheduling.  SO present throughout.  Discussed treatment plan with RN prior, added knee-hi Large/Reg compression stockings followed by ACE wrap on each knee/thigh, abdominal binder in place throughout as usual; discussed usage with SO along with treatment plan.   PT Total Time Spent   PT Individual Total Time Spent (Mins) 60   PT Charge Group   PT Therapeutic Exercise 2   PT Therapeutic Activities 2       Assessment    Patient has improving standing tolerance; asymptomatic even though prepared for possible syncope events; difficulty with BP measurement equipment with  seated breaks today; great social support; fair to good cognition; unable to work on ambulation at this time.    Strengths: Able to follow instructions, Motivated for self care and independence, Independent prior level of function, Good insight into deficits/needs, Willingly participates in therapeutic activities, Pleasant and cooperative  Barriers: Decreased endurance, Generalized weakness, Orthostatic hypotension, Impaired balance, Limited mobility    Plan    Ambulation, Ther ex for strengthening and endurance, Standing tolerance and standing balance, Education.  D/C moved to likely 7/12/21.    Passport items to be completed:  Passport items to be completed:  Get in/out of bed safely, in/out of a vehicle, safely use mobility device, walk or wheel around home/community, navigate up and down stairs, show how to get up/down from the ground, ensure home is accessible, demonstrate HEP, complete caregiver training    Physical Therapy Problems (Active)     Problem: Mobility     Dates: Start: 07/01/21       Goal: STG-Within one week, patient will ambulate household distance of 150 feet with gait belt and SBA.     Dates: Start: 07/01/21          Goal: STG-Within one week, patient will ambulate up/down flight of stairs with B railings, gait belt, and CGA.     Dates: Start: 07/01/21             Problem: Mobility Transfers     Dates: Start: 06/29/21       Goal: STG-Within one week, patient will transfer bed to chair [facility] [place] with LRD In order to maximize self mobility     Dates: Start: 06/29/21       Goal Note filed on 07/01/21 1131 by Luis Morales, PT     CGA with transfers               Problem: PT-Long Term Goals     Dates: Start: 06/29/21       Goal: LTG-By discharge, patient will ambulate 150 ft With LRD at mod I in order to progress towards PLOF      Dates: Start: 06/29/21          Goal: LTG-By discharge, patient will transfer one surface to another At mod I with LRD In order to maximize self mobility     Dates:  Start: 06/29/21          Goal: LTG-By discharge, patient will ambulate up/down flight of stairs With single HR at SPV in order to enter/exit home safely     Dates: Start: 06/29/21

## 2021-07-06 NOTE — PROGRESS NOTES
Hospital Medicine Daily Progress Note      Chief Complaint:  Syncope  Orthostatic hypotension   Adrenal Insufficiency  Transaminitis    Interval History:  No overnight problems.    Review of Systems  Review of Systems   Constitutional: Negative for chills and fever.   HENT: Negative.    Eyes: Negative.    Respiratory: Negative for cough and shortness of breath.    Cardiovascular: Positive for leg swelling. Negative for chest pain and palpitations.   Gastrointestinal: Negative for abdominal pain, nausea and vomiting.   Musculoskeletal: Negative.    Skin: Negative for itching and rash.   Endo/Heme/Allergies: Negative for polydipsia. Does not bruise/bleed easily.        Physical Exam  Temp:  [36.2 °C (97.2 °F)-36.3 °C (97.3 °F)] 36.2 °C (97.2 °F)  Pulse:  [] 67  Resp:  [18] 18  BP: ()/(29-88) 115/77  SpO2:  [96 %-99 %] 96 %    Physical Exam  Vitals reviewed.   Constitutional:       General: She is not in acute distress.     Appearance: She is not ill-appearing.      Comments: Cushingoid   HENT:      Head: Normocephalic and atraumatic.      Right Ear: External ear normal.      Left Ear: External ear normal.      Nose: Nose normal.      Mouth/Throat:      Pharynx: Oropharynx is clear.   Eyes:      General:         Right eye: No discharge.         Left eye: No discharge.      Extraocular Movements: Extraocular movements intact.      Conjunctiva/sclera: Conjunctivae normal.   Neck:      Vascular: No JVD.   Cardiovascular:      Rate and Rhythm: Normal rate and regular rhythm.   Pulmonary:      Effort: Pulmonary effort is normal. No respiratory distress.      Breath sounds: Normal breath sounds. No stridor. No wheezing.   Abdominal:      General: Bowel sounds are normal. There is no distension.      Palpations: Abdomen is soft.      Tenderness: There is no abdominal tenderness.   Musculoskeletal:      Cervical back: Normal range of motion and neck supple.      Right lower leg: Edema present.      Left lower leg:  Edema present.   Skin:     General: Skin is warm and dry.   Neurological:      Mental Status: She is alert and oriented to person, place, and time.         Fluids    Intake/Output Summary (Last 24 hours) at 7/6/2021 1102  Last data filed at 7/6/2021 0530  Gross per 24 hour   Intake 1490 ml   Output --   Net 1490 ml       Laboratory      Recent Labs     07/03/21  1110 07/06/21  0516   SODIUM 138 141   POTASSIUM 3.1* 4.0   CHLORIDE 102 102   CO2 22 29   GLUCOSE 94 102*   BUN 12 8   CREATININE 0.68 0.57   CALCIUM 7.6* 7.6*               Assessment/Plan  Hypophosphatemia  Assessment & Plan  Start Neutra-Phos supplements  Check F/U labs in am     Hemochromatosis  Assessment & Plan  Has waxing/waning transaminitis  Hepatitis Panel negative  U/S RUQ hepatic steatosis or hepatocellular disease    History of pulmonary embolism- (present on admission)  Assessment & Plan  Suspected 2/2 COVID-19 vaccine  Anticoagulated on Eliquis    MRSA (methicillin resistant Staphylococcus aureus) sinus infection- (present on admission)  Assessment & Plan  On long term Xerava    Adrenal insufficiency (Valentín's disease) (HCC)- (present on admission)  Assessment & Plan  On Florinef, Hydrocortisone, and Somatropin  Outpt Endocrine F/U w/ Dr. Jeff [phone]    Orthostatic hypotension- (present on admission)  Assessment & Plan  May be etiology of her syncope  Has chronic history, suspect 2/2 AI  Continue Florinef and Hydrocortisone  Off Losartan  Will likely benefit from tapering off Coreg as well    Hypothyroidism- (present on admission)  Assessment & Plan  TSH 0.02 (low) and FT4 1.27 (normal)  On Synthroid and Cytomel  Outpt Endocrine F/U    Depression- (present on admission)  Assessment & Plan  On Amitriptyline and Cymbalta    Chronic systolic heart failure (HCC)- (present on admission)  Assessment & Plan  EF range has been as low as 20% and as high as 75%  On Lasix  Outpt Cardiology F/U    Gastroesophageal reflux disease without  esophagitis- (present on admission)  Assessment & Plan  On Prilosec    Full Code

## 2021-07-07 PROBLEM — D69.6 THROMBOCYTOPENIA (HCC): Status: ACTIVE | Noted: 2021-07-07

## 2021-07-07 LAB
ANION GAP SERPL CALC-SCNC: 9 MMOL/L (ref 7–16)
BUN SERPL-MCNC: 8 MG/DL (ref 8–22)
CALCIUM SERPL-MCNC: 7.6 MG/DL (ref 8.5–10.5)
CHLORIDE SERPL-SCNC: 101 MMOL/L (ref 96–112)
CO2 SERPL-SCNC: 30 MMOL/L (ref 20–33)
CREAT SERPL-MCNC: 0.39 MG/DL (ref 0.5–1.4)
ERYTHROCYTE [DISTWIDTH] IN BLOOD BY AUTOMATED COUNT: 61.1 FL (ref 35.9–50)
GLUCOSE BLD-MCNC: 83 MG/DL (ref 65–99)
GLUCOSE SERPL-MCNC: 93 MG/DL (ref 65–99)
HCT VFR BLD AUTO: 38.1 % (ref 37–47)
HGB BLD-MCNC: 12.7 G/DL (ref 12–16)
MCH RBC QN AUTO: 30 PG (ref 27–33)
MCHC RBC AUTO-ENTMCNC: 33.3 G/DL (ref 33.6–35)
MCV RBC AUTO: 89.9 FL (ref 81.4–97.8)
MORPHOLOGY BLD-IMP: NORMAL
NT-PROBNP SERPL IA-MCNC: 314 PG/ML (ref 0–125)
PLATELET # BLD AUTO: 30 K/UL (ref 164–446)
PLATELETS.RETICULATED NFR BLD AUTO: 32.1 K/UL (ref 0.6–13.1)
POTASSIUM SERPL-SCNC: 3.9 MMOL/L (ref 3.6–5.5)
RBC # BLD AUTO: 4.24 M/UL (ref 4.2–5.4)
SODIUM SERPL-SCNC: 140 MMOL/L (ref 135–145)
TROPONIN T SERPL-MCNC: 7 NG/L (ref 6–19)
WBC # BLD AUTO: 6.5 K/UL (ref 4.8–10.8)

## 2021-07-07 PROCEDURE — 97110 THERAPEUTIC EXERCISES: CPT

## 2021-07-07 PROCEDURE — 87186 SC STD MICRODIL/AGAR DIL: CPT

## 2021-07-07 PROCEDURE — 97530 THERAPEUTIC ACTIVITIES: CPT

## 2021-07-07 PROCEDURE — 80048 BASIC METABOLIC PNL TOTAL CA: CPT

## 2021-07-07 PROCEDURE — 85027 COMPLETE CBC AUTOMATED: CPT

## 2021-07-07 PROCEDURE — 82962 GLUCOSE BLOOD TEST: CPT

## 2021-07-07 PROCEDURE — 770010 HCHG ROOM/CARE - REHAB SEMI PRIVAT*

## 2021-07-07 PROCEDURE — 97535 SELF CARE MNGMENT TRAINING: CPT

## 2021-07-07 PROCEDURE — A9270 NON-COVERED ITEM OR SERVICE: HCPCS | Performed by: HOSPITALIST

## 2021-07-07 PROCEDURE — 700105 HCHG RX REV CODE 258

## 2021-07-07 PROCEDURE — A9270 NON-COVERED ITEM OR SERVICE: HCPCS | Performed by: PHYSICAL MEDICINE & REHABILITATION

## 2021-07-07 PROCEDURE — 87077 CULTURE AEROBIC IDENTIFY: CPT

## 2021-07-07 PROCEDURE — 87205 SMEAR GRAM STAIN: CPT

## 2021-07-07 PROCEDURE — 700102 HCHG RX REV CODE 250 W/ 637 OVERRIDE(OP): Performed by: PHYSICAL MEDICINE & REHABILITATION

## 2021-07-07 PROCEDURE — 700101 HCHG RX REV CODE 250: Performed by: PHYSICAL MEDICINE & REHABILITATION

## 2021-07-07 PROCEDURE — 85055 RETICULATED PLATELET ASSAY: CPT

## 2021-07-07 PROCEDURE — 87070 CULTURE OTHR SPECIMN AEROBIC: CPT

## 2021-07-07 PROCEDURE — 83880 ASSAY OF NATRIURETIC PEPTIDE: CPT

## 2021-07-07 PROCEDURE — 700102 HCHG RX REV CODE 250 W/ 637 OVERRIDE(OP): Performed by: HOSPITALIST

## 2021-07-07 PROCEDURE — 94760 N-INVAS EAR/PLS OXIMETRY 1: CPT

## 2021-07-07 PROCEDURE — 99232 SBSQ HOSP IP/OBS MODERATE 35: CPT | Performed by: HOSPITALIST

## 2021-07-07 PROCEDURE — 700111 HCHG RX REV CODE 636 W/ 250 OVERRIDE (IP)

## 2021-07-07 PROCEDURE — 99233 SBSQ HOSP IP/OBS HIGH 50: CPT | Performed by: PHYSICAL MEDICINE & REHABILITATION

## 2021-07-07 PROCEDURE — 84484 ASSAY OF TROPONIN QUANT: CPT

## 2021-07-07 RX ORDER — FLUDROCORTISONE ACETATE 0.1 MG/1
0.1 TABLET ORAL 2 TIMES DAILY
Status: DISCONTINUED | OUTPATIENT
Start: 2021-07-07 | End: 2021-07-12 | Stop reason: HOSPADM

## 2021-07-07 RX ADMIN — APIXABAN 5 MG: 5 TABLET, FILM COATED ORAL at 21:41

## 2021-07-07 RX ADMIN — DIBASIC SODIUM PHOSPHATE, MONOBASIC POTASSIUM PHOSPHATE AND MONOBASIC SODIUM PHOSPHATE 500 MG: 852; 155; 130 TABLET ORAL at 08:30

## 2021-07-07 RX ADMIN — LEVOTHYROXINE SODIUM 75 MCG: 75 TABLET ORAL at 05:26

## 2021-07-07 RX ADMIN — ACETAMINOPHEN 650 MG: 325 TABLET, FILM COATED ORAL at 20:11

## 2021-07-07 RX ADMIN — APIXABAN 5 MG: 5 TABLET, FILM COATED ORAL at 08:30

## 2021-07-07 RX ADMIN — HYDROCORTISONE 10 MG: 10 TABLET ORAL at 15:22

## 2021-07-07 RX ADMIN — ACETAMINOPHEN 650 MG: 325 TABLET, FILM COATED ORAL at 10:12

## 2021-07-07 RX ADMIN — FUROSEMIDE 40 MG: 40 TABLET ORAL at 05:26

## 2021-07-07 RX ADMIN — SUMATRIPTAN SUCCINATE 50 MG: 25 TABLET ORAL at 05:27

## 2021-07-07 RX ADMIN — GABAPENTIN 400 MG: 400 CAPSULE ORAL at 08:30

## 2021-07-07 RX ADMIN — GABAPENTIN 400 MG: 400 CAPSULE ORAL at 21:41

## 2021-07-07 RX ADMIN — COLESEVELAM HYDROCHLORIDE 625 MG: 625 TABLET, FILM COATED ORAL at 21:41

## 2021-07-07 RX ADMIN — FLUDROCORTISONE ACETATE 0.1 MG: 0.1 TABLET ORAL at 11:37

## 2021-07-07 RX ADMIN — DIBASIC SODIUM PHOSPHATE, MONOBASIC POTASSIUM PHOSPHATE AND MONOBASIC SODIUM PHOSPHATE 500 MG: 852; 155; 130 TABLET ORAL at 15:22

## 2021-07-07 RX ADMIN — CHOLECALCIFEROL TAB 25 MCG (1000 UNIT) 2000 UNITS: 25 TAB at 08:30

## 2021-07-07 RX ADMIN — CALCITONIN SALMON 200 UNITS: 200 SPRAY, METERED NASAL at 21:42

## 2021-07-07 RX ADMIN — AMITRIPTYLINE HYDROCHLORIDE 10 MG: 10 TABLET, FILM COATED ORAL at 08:30

## 2021-07-07 RX ADMIN — LIDOCAINE 1 PATCH: 50 PATCH TOPICAL at 08:31

## 2021-07-07 RX ADMIN — COLESEVELAM HYDROCHLORIDE 625 MG: 625 TABLET, FILM COATED ORAL at 15:22

## 2021-07-07 RX ADMIN — FLUDROCORTISONE ACETATE 0.1 MG: 0.1 TABLET ORAL at 05:26

## 2021-07-07 RX ADMIN — SUMATRIPTAN SUCCINATE 50 MG: 25 TABLET ORAL at 20:11

## 2021-07-07 RX ADMIN — POTASSIUM CHLORIDE 40 MEQ: 1500 TABLET, EXTENDED RELEASE ORAL at 08:30

## 2021-07-07 RX ADMIN — LIOTHYRONINE SODIUM 25 MCG: 25 TABLET ORAL at 21:41

## 2021-07-07 RX ADMIN — COLESEVELAM HYDROCHLORIDE 625 MG: 625 TABLET, FILM COATED ORAL at 08:30

## 2021-07-07 RX ADMIN — HYDROCORTISONE 20 MG: 10 TABLET ORAL at 08:29

## 2021-07-07 RX ADMIN — DULOXETINE 60 MG: 30 CAPSULE, DELAYED RELEASE ORAL at 21:40

## 2021-07-07 RX ADMIN — CARVEDILOL 3.12 MG: 3.12 TABLET, FILM COATED ORAL at 09:00

## 2021-07-07 RX ADMIN — DIBASIC SODIUM PHOSPHATE, MONOBASIC POTASSIUM PHOSPHATE AND MONOBASIC SODIUM PHOSPHATE 500 MG: 852; 155; 130 TABLET ORAL at 21:41

## 2021-07-07 RX ADMIN — OMEPRAZOLE 20 MG: 20 CAPSULE, DELAYED RELEASE ORAL at 08:30

## 2021-07-07 RX ADMIN — MONTELUKAST 10 MG: 10 TABLET, FILM COATED ORAL at 21:41

## 2021-07-07 ASSESSMENT — ENCOUNTER SYMPTOMS
EYES NEGATIVE: 1
FEVER: 0
VOMITING: 0
MUSCULOSKELETAL NEGATIVE: 1
BRUISES/BLEEDS EASILY: 0
POLYDIPSIA: 0
COUGH: 0
ABDOMINAL PAIN: 0
SHORTNESS OF BREATH: 0
CHILLS: 0
PALPITATIONS: 0
NAUSEA: 0

## 2021-07-07 ASSESSMENT — ACTIVITIES OF DAILY LIVING (ADL)
TOILET_TRANSFER_DESCRIPTION: GRAB BAR;SET-UP OF EQUIPMENT;SUPERVISION FOR SAFETY;VERBAL CUEING
TOILETING_LEVEL_OF_ASSIST_DESCRIPTION: GRAB BAR;INCREASED TIME;SUPERVISION FOR SAFETY;VERBAL CUEING
TOILETING_LEVEL_OF_ASSIST_DESCRIPTION: GRAB BAR;INCREASED TIME;SUPERVISION FOR SAFETY
BED_CHAIR_WHEELCHAIR_TRANSFER_DESCRIPTION: INCREASED TIME;SET-UP OF EQUIPMENT;SUPERVISION FOR SAFETY
TOILET_TRANSFER_DESCRIPTION: GRAB BAR;INCREASED TIME;SUPERVISION FOR SAFETY;VERBAL CUEING

## 2021-07-07 ASSESSMENT — GAIT ASSESSMENTS
DISTANCE (FEET): 0
GAIT LEVEL OF ASSIST: UNABLE TO PARTICIPATE

## 2021-07-07 ASSESSMENT — PAIN DESCRIPTION - PAIN TYPE: TYPE: ACUTE PAIN

## 2021-07-07 NOTE — THERAPY
Missed Therapy     Patient Name: Donna Isaac  Age:  56 y.o., Sex:  female  Medical Record #: [id number]  Today's Date: 7/7/2021    Discussed missed therapy with patient, MD, therapy scheduling, OT, and CNA.       07/07/21 1116   Interdisciplinary Plan of Care Collaboration   IDT Collaboration with  Physician;Therapy Tech;Occupational Therapist   Patient Position at End of Therapy In Bed;Call Light within Reach;Tray Table within Reach;Phone within Reach   Collaboration Comments Syncopal event during 0900 OT session; assisted with patient back to bed with med team present.  This session, patient reports still has mild HA and recovering after syncopal event; will attempt p.m. therapies as tolerated as originally scheduled with 1300 OT and 1400 PT; notified CNA of completed IV administration; therapy scheduling aware of MD placing medical hold this a.m. and hopeful for patient participation as tolerated.   Therapy Missed   Missed Therapy (Minutes) 30   Reason For Missed Therapy Medical - Patient on Hold from Therapy;Medical - Patient not Able To Participate  (Hold placed 2° syncopal event; resume therapy as tal this pm)

## 2021-07-07 NOTE — FLOWSHEET NOTE
07/07/21 0951   Events/Summary/Plan   Events/Summary/Plan rapid response for syncope, 02 placed at 1 lpm for SATS of 91 with dyspnea, EKG done   Vital Signs   Pulse Oximetry 91 %   $ Pulse Oximetry (Spot Check) Yes   Respiratory Assessment   Level of Consciousness Alert   Chest Exam   Work Of Breathing / Effort Within Normal Limits   Oxygen   O2 (LPM) 1   O2 Delivery Device Silicone Nasal Cannula

## 2021-07-07 NOTE — CARE PLAN
The patient is Watcher - Medium risk of patient condition declining or worsening    Shift Goals  Patient Goals: Sleep well    Progress made toward(s) clinical / shift goals:        Problem: Knowledge Deficit - Standard  Goal: Patient and family/care givers will demonstrate understanding of plan of care, disease process/condition, diagnostic tests and medications  Outcome: Progressing  Patient was unresponsive this am. Rapid response called. STAT labs ordered.   Reported having headache after syncope episode. Medicated her with tylenol.   Said she was feeling better this afternoon. Will continue to monitor.     Problem: Pain - Standard  Goal: Alleviation of pain or a reduction in pain to the patient’s comfort goal  Outcome: Progressing

## 2021-07-07 NOTE — DISCHARGE PLANNING
Multiple messages left for [facility] to call me back regarding status of referral.  Left messages on 7/1, 7/2 and 7/7 with no replies.  CM notified.

## 2021-07-07 NOTE — PROGRESS NOTES
"Rehab Progress Note     Encounter Date: 7/6/2021    CC: Orthostatic hypotension, syncope    Interval Events (Subjective)  Patient sitting up in room. She had another syncopal episode when trying to check orthostatic hypotension over the weekend. She reports her  did well with training today but concern for syncope. Discussed with therapy and would like additional time for training for adaptive techniques. Discussed about fluid intake. Discussed with CM about extension to next week.  She reports her biggest concern is the inconsistent incontinence. She has concerns it is related to her autonomic insufficiency. It is not associated with her syncopal events.     IDT Team Meeting 7/1/2021  DC/Disposition:  7/8/21 -> 7/12/21    Objective:  VITAL SIGNS: /76   Pulse 90   Temp (!) 35.8 °C (96.4 °F) (Temporal)   Resp 16   Ht 1.626 m (5' 4\")   Wt 100 kg (220 lb 10.9 oz)   LMP  (LMP Unknown)   SpO2 98%   BMI 37.88 kg/m²    Gen: NAD  Psych: Mood and affect appropriate  CV: RRR, no edema  Resp: CTAB, no upper airway sounds  Abd: NTND  Neuro: AOx4, following commands    Recent Results (from the past 72 hour(s))   Group A Strep by PCR    Collection Time: 07/05/21 12:23 PM   Result Value Ref Range    Group A Strep by PCR Not Detected Not Detected   Comp Metabolic Panel    Collection Time: 07/06/21  5:16 AM   Result Value Ref Range    Sodium 141 135 - 145 mmol/L    Potassium 4.0 3.6 - 5.5 mmol/L    Chloride 102 96 - 112 mmol/L    Co2 29 20 - 33 mmol/L    Anion Gap 10.0 7.0 - 16.0    Glucose 102 (H) 65 - 99 mg/dL    Bun 8 8 - 22 mg/dL    Creatinine 0.57 0.50 - 1.40 mg/dL    Calcium 7.6 (L) 8.5 - 10.5 mg/dL    AST(SGOT) 102 (H) 12 - 45 U/L    ALT(SGPT) 113 (H) 2 - 50 U/L    Alkaline Phosphatase 300 (H) 30 - 99 U/L    Total Bilirubin 4.6 (H) 0.1 - 1.5 mg/dL    Albumin 1.9 (L) 3.2 - 4.9 g/dL    Total Protein 3.5 (L) 6.0 - 8.2 g/dL    Globulin 1.6 (L) 1.9 - 3.5 g/dL    A-G Ratio 1.2 g/dL   MAGNESIUM    Collection " Time: 07/06/21  5:16 AM   Result Value Ref Range    Magnesium 1.6 1.5 - 2.5 mg/dL   PHOSPHORUS    Collection Time: 07/06/21  5:16 AM   Result Value Ref Range    Phosphorus 2.4 (L) 2.5 - 4.5 mg/dL   ESTIMATED GFR    Collection Time: 07/06/21  5:16 AM   Result Value Ref Range    GFR If African American >60 >60 mL/min/1.73 m 2    GFR If Non African American >60 >60 mL/min/1.73 m 2       Current Facility-Administered Medications   Medication Frequency   • phosphorus (K-Phos-Neutral) per tablet 500 mg TID   • [START ON 7/7/2021] carvedilol (COREG) tablet 3.125 mg DAILY   • hydrophilic ointment (AQUAPHOR) OINT BID   • benzocaine (ANBESOL/ORAJEL) 20 % gel PRN   • Somatropin SOLR 0.3 mg QHS   • montelukast (SINGULAIR) tablet 10 mg Q EVENING   • furosemide (LASIX) tablet 40 mg Q DAY   • potassium chloride SA (Kdur) tablet 40 mEq DAILY   • senna-docusate (PERICOLACE or SENOKOT S) 8.6-50 MG per tablet 2 tablet BID PRN   • Eravacycline Dihydrochloride (XERAVA) 100 mg in  mL IVPB Q12HRS   • polyethylene glycol/lytes (MIRALAX) PACKET 1 Packet QDAY PRN   • magnesium hydroxide (MILK OF MAGNESIA) suspension 30 mL QDAY PRN   • bisacodyl (DULCOLAX) suppository 10 mg QDAY PRN   • ondansetron (ZOFRAN ODT) dispertab 4 mg 4X/DAY PRN   • ondansetron (ZOFRAN) syringe/vial injection 4 mg 4X/DAY PRN   • fludrocortisone (FLORINEF) tablet 0.1 mg QAM   • hydrocortisone (CORTEF) tablet 10 mg DAILY   • hydrocortisone (CORTEF) tablet 20 mg DAILY   • vitamin D (cholecalciferol) tablet 2,000 Units DAILY   • tizanidine (ZANAFLEX) tablet 4 mg QDAY PRN   • SUMAtriptan (IMITREX) tablet 50 mg Q2HRS PRN   • omeprazole (PRILOSEC) capsule 20 mg DAILY   • liothyronine (CYTOMEL) tablet 25 mcg QHS   • levothyroxine (SYNTHROID) tablet 75 mcg AM ES   • gabapentin (NEURONTIN) capsule 400 mg BID   • DULoxetine (CYMBALTA) capsule 60 mg QHS   • colesevelam (WELCHOL) tablet 625 mg TID   • calcitonin (salmon) (MIACALCIN) nasal spray 200 Units QHS   • apixaban  (ELIQUIS) tablet 5 mg BID   • amitriptyline (ELAVIL) tablet 10 mg DAILY   • sodium chloride (OCEAN) 0.65 % nasal spray 2 Spray PRN   • traZODone (DESYREL) tablet 50 mg QHS PRN   • mag hydrox-al hydrox-simeth (MAALOX PLUS ES or MYLANTA DS) suspension 20 mL Q2HRS PRN   • benzocaine-menthol (CEPACOL) lozenge 1 Lozenge Q2HRS PRN   • artificial tears ophthalmic solution 1 Drop PRN   • acetaminophen (Tylenol) tablet 650 mg Q4HRS PRN   • hydrALAZINE (APRESOLINE) tablet 25 mg Q8HRS PRN   • lidocaine (LIDODERM) 5 % 1 Patch DAILY       Orders Placed This Encounter   Procedures   • Diet Order Diet: Regular     Standing Status:   Standing     Number of Occurrences:   1     Order Specific Question:   Diet:     Answer:   Regular [1]       Assessment:  Active Hospital Problems    Diagnosis    • Syncope    • Transaminitis    • High potassium    • History of pulmonary embolism    • Adrenal insufficiency (Valentín's disease) ([facility])    • Hypertension    • Tachycardia    • Orthostatic hypotension    • Hypothyroidism    • Chronic systolic heart failure (HCC)    • Gastroesophageal reflux disease without esophagitis    • Depression        Medical Decision Making and Plan:  Orthostatic hypotension - Probably a combination of volume depletion and underlying [place]'s disease, was started on Florinef at [facility] as well as SILVERIO hoses and abdominal binder  -PT and OT for mobility and ADLs  -Continue SILVERIO hoses and abdominal binder  -On Florinef 0.1 mg daily. Has CHF so needs to be on ARB and BB at lowest dose. Consider midodrine.   -Consult hospitalist. 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 Now on Florinef. Will monitor electrolytes.   -Consult Hospitalist, considering increase in Hydrocortisone level. Currently 20 mg qAM and 10 mg q 1PM. Also changed Florinef to earlier morning dose  -Patient to bring in home medications prescribed by Endocrinologist      Incontinence - Patient  with bowel and bladder incontinence which I typically do not associate with autonomic dysfunction but was present in literature. Removed bowel medications and any laxatives. She is on Lasix.  Will need follow-up. She has not had any spinal imaging (weakness, orthostatic hypotension, incontinence) which might be a consideration as outpatient.      CHF - Patient on Coreg 3.125 mg and Losartan 25 mg daily     Neuropathy - Patient on Gabapentin 400 mg BID     HLD - Patient on welchol.      CKD - Avoid nephrotoxic agents. Check AM CMP 0.61, stable     Anemia - Check AM CBC - resolved     Mood/Depression/Pain - Patient is on Cymbalta 60 mg and Amitriptyline 10 mg daily.      Elevated LFTs - Elevated on admission into 200s. Will monitor, was previously downtrending.   -Into 100s on repeat, will monitor    Asthma/COPD - Patient on Singulair on transfer.      Hypothyroidism - Patient on Levothyroxine 75 mcg and Cytomel 25 mcg QHS. Low TSH on admission, normal T4    MRSA sinusitis - Prolonged treatment, with multiple allergies to antibiotics. On Eravacycline 100 mg BID. Working with pharmacy on prescription of medication     Morbid Obesity due to excess calories - BMI of 37.84 on admission, meets medical criteria. Dietitian to follow     GERD -Patient on omeprazole on transfer.      Hx of PE - Patient on Eliquis    Total time:  26 minutes.  I spent greater than 50% of the time for patient care, counseling, and coordination on this date, including unit/floor time, and face-to-face time with the patient as per interval events and assessment and plan above. Topics discussed included ongoing orthostatic hypotension, family training, incontinence, and follow-up.     Darion Metzger M.D.

## 2021-07-07 NOTE — THERAPY
"Occupational Therapy  Daily Treatment     Patient Name: Donna Isaac  Age:  56 y.o., Sex:  female  Medical Record #: [id number]  Today's Date: 7/7/2021     Precautions  Precautions: Fall Risk, Other (See Comments)  Comments: Hx of syncopal episodes, Orthostatic Hypotension, abdominal binder OOB, R ankle brace, [place] (deaf R ear)    Safety   ADL Safety : Requires Physical Assist for Safety, Requires Supervision for Safety  Bathroom Safety: Requires Supervision for Safety, Requires Physical Assist for Safety  Comments: see below functional levels for ADL performance details.     Subjective       Objective       07/07/21 1301   Precautions   Precautions Fall Risk;Other (See Comments)   Comments Hx of syncopal episodes, Orthostatic Hypotension, abdominal binder OOB, R ankle brace, [place] (deaf R ear)   Vitals   O2 Delivery Device None - Room Air   Non Verbal Descriptors   Non Verbal Scale  Calm   Cognition    Level of Consciousness Alert   Sleep/Wake Cycle   Sleep & Rest Awake   Functional Level of Assist   Toileting Stand by Assist   Toileting Description Grab bar;Increased time;Supervision for safety   Toilet Transfers Contact Guard Assist   Toilet Transfer Description Grab bar;Increased time;Supervision for safety;Verbal cueing   Sitting Upper Body Exercises   Chest Press 3 sets of 10  (\"Equalizer\" 3x10@15#'s)   Tricep Press 3 sets of 10  (Rickshaw:[id number]@20#'s;[id number]@15#'s)   Upper Extremity Bike Level 2 Resistance  (FluidoBike, level 2, 16 mins, 1.798km)   Other Exercise Seated row @ \"Equalizer\" 3x10@15#'s   Interdisciplinary Plan of Care Collaboration   IDT Collaboration with  Nursing   Patient Position at End of Therapy Seated;Self Releasing Lap Belt Applied   OT Total Time Spent   OT Individual Total Time Spent (Mins) 60   OT Charge Group   OT Self Care / ADL 1   OT Therapeutic Exercise  3       Assessment    Pt was alert and cooperative w/ tx.  Tx emphasis on ADL's at wc level + TherEx - see notes " above for details  Strengths: Pleasant and cooperative, Willingly participates in therapeutic activities  Barriers: Decreased endurance, Fatigue, Generalized weakness, Hearing impairment, Orthostatic hypotension, Impaired activity tolerance, Impaired balance, Limited mobility    Plan    Cooking and/or laundry task with combination of standing and sitting, ADLs, IADLs, functional mobility, AE education, strength/endurance building, balance.     Passport items to be completed:  Passport items to be completed:  Perform bathroom transfers, complete dressing, complete feeding, get ready for the day, prepare a simple meal, participate in household tasks, adapt home for safety needs, demonstrate home exercise program, complete caregiver training     Occupational Therapy Goals (Active)     Problem: Dressing     Dates: Start: 06/29/21       Goal: STG-Within one week, patient will dress LB     Dates: Start: 06/29/21       Goal Note filed on 07/01/21 1139 by Dennis Alcantar MS,OTR/L     Max assist to manage LB clothing - Total assist for SILVERIO hose and R ankle brace, Max assist to don shoes, Min assist to manage briefs               Problem: Functional Transfers     Dates: Start: 06/29/21       Goal: STG-Within one week, patient will transfer to toilet     Dates: Start: 06/29/21       Goal Note filed on 07/01/21 1139 by Dennis Alcantar MS,OTR/L     CGA via DME               Problem: OT Long Term Goals     Dates: Start: 06/29/21       Goal: LTG-By discharge, patient will complete basic self care tasks     Dates: Start: 06/29/21       Goal Note filed on 06/29/21 0823 by Dennis Alcantar MS,OTR/L     1) Individualized Goal:  Mod I for BADL's via AE/DME PRN  2) Interventions:  OT Self Care/ADL, OT Neuro Re-Ed/Balance, OT Therapeutic Activity, OT Evaluation, and OT Therapeutic Exercise            Goal: LTG-By discharge, patient will perform bathroom transfers     Dates: Start: 06/29/21       Goal Note filed on 06/29/21 0823 by Dennis Alcantar  MS,OTR/L     1) Individualized Goal:  Mod I for bathroom transfers via DME PRN  2) Interventions: OT Self Care/ADL, OT Neuro Re-Ed/Balance, OT Therapeutic Activity, OT Evaluation, and OT Therapeutic Exercise               Problem: Toileting     Dates: Start: 06/29/21       Goal: STG-Within one week, patient will complete toileting tasks     Dates: Start: 06/29/21       Goal Note filed on 07/01/21 1139 by Dennis Alcantar MS,OTR/L     CGA/Min assist

## 2021-07-07 NOTE — THERAPY
Missed Therapy     Patient Name: Donna Isaac  Age:  56 y.o., Sex:  female  Medical Record #: [id number]  Today's Date: 7/7/2021    Discussed missed therapy with patient, RN, and therapy scheduling.       07/07/21 0831   Interdisciplinary Plan of Care Collaboration   IDT Collaboration with  Nursing;Therapy Tech   Collaboration Comments RN with patient for meds admin and blood labs; pt in agreement with therapy reschedule to 1100 for this session; therapy scheduling notified.   Therapy Missed   Missed Therapy (Minutes) 30   Reason For Missed Therapy Medical - Patient with Nursing;Medical - Patient not Able To Participate  (scheduling makeup therapy for 1100 today)

## 2021-07-07 NOTE — PROGRESS NOTES
Hospital Medicine Daily Progress Note      Chief Complaint:  Syncope  Orthostatic hypotension   Adrenal Insufficiency  Transaminitis    Interval History:  Rapid Response called for syncope during therapies.  Pt aroused after about 2-3 minutes of unconsciousness, oriented x 4.  Pt c/o HA, which she reports is normal for her after a syncopal episode.  Denied CP or SOB.  Morning CBC/BMP reviewed.  STAT Trop, BNP, and EKG ordered.    Review of Systems  Review of Systems   Constitutional: Negative for chills and fever.   HENT: Negative.    Eyes: Negative.    Respiratory: Negative for cough and shortness of breath.    Cardiovascular: Positive for leg swelling. Negative for chest pain and palpitations.   Gastrointestinal: Negative for abdominal pain, nausea and vomiting.   Musculoskeletal: Negative.    Skin: Negative for itching and rash.   Endo/Heme/Allergies: Negative for polydipsia. Does not bruise/bleed easily.        Physical Exam  Temp:  [35.8 °C (96.4 °F)-36.4 °C (97.5 °F)] 36.4 °C (97.5 °F)  Pulse:  [] 60  Resp:  [16-18] 18  BP: ()/(27-85) 122/80  SpO2:  [91 %-98 %] 91 %    Physical Exam  Vitals reviewed.   Constitutional:       General: She is not in acute distress.     Appearance: She is not ill-appearing.      Comments: Cushingoid   HENT:      Head: Normocephalic and atraumatic.      Right Ear: External ear normal.      Left Ear: External ear normal.      Nose: Nose normal.      Mouth/Throat:      Pharynx: Oropharynx is clear.   Eyes:      General:         Right eye: No discharge.         Left eye: No discharge.      Extraocular Movements: Extraocular movements intact.      Conjunctiva/sclera: Conjunctivae normal.   Neck:      Vascular: No JVD.   Cardiovascular:      Rate and Rhythm: Normal rate and regular rhythm.   Pulmonary:      Effort: Pulmonary effort is normal. No respiratory distress.      Breath sounds: Normal breath sounds. No stridor. No wheezing.   Abdominal:      General: Bowel sounds  are normal. There is no distension.      Palpations: Abdomen is soft.      Tenderness: There is no abdominal tenderness.   Musculoskeletal:      Cervical back: Normal range of motion and neck supple.      Right lower leg: Edema present.      Left lower leg: Edema present.   Skin:     General: Skin is warm and dry.   Neurological:      Mental Status: She is alert and oriented to person, place, and time.         Fluids    Intake/Output Summary (Last 24 hours) at 7/7/2021 1042  Last data filed at 7/6/2021 2133  Gross per 24 hour   Intake 360 ml   Output --   Net 360 ml       Laboratory  Recent Labs     07/07/21  0845   WBC 6.5   RBC 4.24   HEMOGLOBIN 12.7   HEMATOCRIT 38.1   MCV 89.9   MCH 30.0   MCHC 33.3*   RDW 61.1*   PLATELETCT 30*     Recent Labs     07/06/21  0516 07/07/21  0538   SODIUM 141 140   POTASSIUM 4.0 3.9   CHLORIDE 102 101   CO2 29 30   GLUCOSE 102* 93   BUN 8 8   CREATININE 0.57 0.39*   CALCIUM 7.6* 7.6*               Assessment/Plan  Thrombocytopenia (HCC)  Assessment & Plan  Suspect 2/2 Eliquis vs Xerava  Consider holding one of these meds  Transfuse Platelets if counts<10k or active bleeding  Follow Platelet counts    Hypophosphatemia  Assessment & Plan  Started on Neutra-Phos supplements  Check F/U labs in am     Hemochromatosis  Assessment & Plan  Has waxing/waning transaminitis  Hepatitis Panel negative  U/S RUQ hepatic steatosis or hepatocellular disease    History of pulmonary embolism- (present on admission)  Assessment & Plan  Suspected 2/2 COVID-19 vaccine  Anticoagulated on Eliquis    MRSA (methicillin resistant Staphylococcus aureus) sinus infection- (present on admission)  Assessment & Plan  On long term Xerava  Pt reports ID would like repeat Sinus Culture    Adrenal insufficiency ([place]'s disease) (HCC)- (present on admission)  Assessment & Plan  On Florinef, Hydrocortisone, and Somatropin  Outpt Endocrine F/U w/ Dr. Jeff [phone]    Orthostatic hypotension- (present on  admission)  Assessment & Plan  May be etiology of her syncope  Has chronic history, suspect 2/2 AI  Will increase Florinef from qd to bid dosing  Continue Hydrocortisone  Off Losartan, will taper off Coreg as well    Hypothyroidism- (present on admission)  Assessment & Plan  TSH 0.02 (low) and FT4 1.27 (normal)  On Synthroid and Cytomel  Outpt Endocrine F/U    Depression- (present on admission)  Assessment & Plan  On Amitriptyline and Cymbalta    Chronic systolic heart failure (HCC)- (present on admission)  Assessment & Plan  EF range has been as low as 20% and as high as 75%  Continue Lasix as tolerated by blood pressure and renal function  Outpt Cardiology F/U    Gastroesophageal reflux disease without esophagitis- (present on admission)  Assessment & Plan  On Prilosec    Full Code    Discussed w/ pt, bedside RN (Haylee), Charge RN (Venessa), Pharmacy (Ryan), and Dr. Metzger.

## 2021-07-07 NOTE — THERAPY
Occupational Therapy  Daily Treatment     Patient Name: Donna Isaac  Age:  56 y.o., Sex:  female  Medical Record #: [id number]  Today's Date: 7/7/2021     Precautions  Precautions: Fall Risk, Other (See Comments)  Comments: Hx of syncopal episodes, Orthostatic Hypotension, abdominal binder OOB, R ankle brace, [place] (deaf R ear)    Safety   ADL Safety : Requires Physical Assist for Safety, Requires Supervision for Safety  Bathroom Safety: Requires Supervision for Safety, Requires Physical Assist for Safety  Comments: see below functional levels for ADL performance details.     Subjective       Objective       07/07/21 0901   Precautions   Precautions Fall Risk;Other (See Comments)   Comments Hx of syncopal episodes, Orthostatic Hypotension, abdominal binder OOB, R ankle brace, [place] (deaf R ear)   Vitals   Pulse 64   Patient BP Position Sitting   Blood Pressure 134/82   Respiration 18   Pulse Oximetry 94 %   O2 Delivery Device None - Room Air   Vitals Comments Standing at end of session to don abdominal binder resulting in LOC, pt safely guided to bed/supine - Rapid called   Non Verbal Descriptors   Non Verbal Scale  Calm   Cognition    Level of Consciousness Alert   Sleep/Wake Cycle   Sleep & Rest Awake   Functional Level of Assist   Grooming Modified Independent;Seated   Upper Body Dressing Modified Independent   Upper Body Dressing Description Increased time   Lower Body Dressing Minimal Assist   Lower Body Dressing Description Reacher;Grab bar;Increased time;Initial preparation for task;Set-up of equipment;Supervision for safety;Verbal cueing   Toileting Stand by Assist   Toileting Description Grab bar;Increased time;Supervision for safety;Verbal cueing   Toilet Transfers Contact Guard Assist   Toilet Transfer Description Grab bar;Set-up of equipment;Supervision for safety;Verbal cueing   Interdisciplinary Plan of Care Collaboration   IDT Collaboration with  Nursing;Certified Nursing Assistant;Physical  Therapist;Physician   Patient Position at End of Therapy In Bed;Call Light within Reach;Tray Table within Reach;Phone within Reach   Collaboration Comments End of session patient in standing to manage abdominal binder with hospital bed to back of patient and therapist CGA via gait belt, LOC, patient safely guided to supine on bed, rapid called.  Transition of care to rapid response team.   OT Total Time Spent   OT Individual Total Time Spent (Mins) 30   OT Charge Group   OT Self Care / ADL 2       Assessment    Pt was alert and cooperative w/ tx.  Patient supine in bed with hospital gown on.  OT tx session emphasis on ADL's at wc level.  Noted LOC at end of session - see notes above for details.  Strengths: Pleasant and cooperative, Willingly participates in therapeutic activities  Barriers: Decreased endurance, Fatigue, Generalized weakness, Hearing impairment, Orthostatic hypotension, Impaired activity tolerance, Impaired balance, Limited mobility    Plan    Cooking and/or laundry task with combination of standing and sitting, ADLs, IADLs, functional mobility, AE education, strength/endurance building, balance.     Passport items to be completed:  Passport items to be completed:  Perform bathroom transfers, complete dressing, complete feeding, get ready for the day, prepare a simple meal, participate in household tasks, adapt home for safety needs, demonstrate home exercise program, complete caregiver training     Occupational Therapy Goals (Active)     Problem: Dressing     Dates: Start: 06/29/21       Goal: STG-Within one week, patient will dress LB     Dates: Start: 06/29/21       Goal Note filed on 07/01/21 1139 by Dennis Alcantar MS,OTR/L     Max assist to manage LB clothing - Total assist for SILVERIO hose and R ankle brace, Max assist to don shoes, Min assist to manage briefs               Problem: Functional Transfers     Dates: Start: 06/29/21       Goal: STG-Within one week, patient will transfer to toilet      Dates: Start: 06/29/21       Goal Note filed on 07/01/21 1139 by Dennis Alcantar MS,OTR/L     CGA via DME               Problem: OT Long Term Goals     Dates: Start: 06/29/21       Goal: LTG-By discharge, patient will complete basic self care tasks     Dates: Start: 06/29/21       Goal Note filed on 06/29/21 0823 by Dennis Alcantar MS,OTR/L     1) Individualized Goal:  Mod I for BADL's via AE/DME PRN  2) Interventions:  OT Self Care/ADL, OT Neuro Re-Ed/Balance, OT Therapeutic Activity, OT Evaluation, and OT Therapeutic Exercise            Goal: LTG-By discharge, patient will perform bathroom transfers     Dates: Start: 06/29/21       Goal Note filed on 06/29/21 0823 by Dennis Alcantar MS,OTR/L     1) Individualized Goal:  Mod I for bathroom transfers via DME PRN  2) Interventions: OT Self Care/ADL, OT Neuro Re-Ed/Balance, OT Therapeutic Activity, OT Evaluation, and OT Therapeutic Exercise               Problem: Toileting     Dates: Start: 06/29/21       Goal: STG-Within one week, patient will complete toileting tasks     Dates: Start: 06/29/21       Goal Note filed on 07/01/21 1139 by Dennis Alcantar MS,OTR/L     CGA/Min assist

## 2021-07-07 NOTE — PROGRESS NOTES
Rapid Response called on pt at 0924, pt was with OT when she lost consciousness and was none responsive for approximately  2 minutes. When pt woke up she was tired, but A&ox4 and VSS. Labs, EKG, ordered and completed.

## 2021-07-07 NOTE — ASSESSMENT & PLAN NOTE
Suspect 2/2 Eliquis vs Xerava (both relatively new to the pt)  Other culprit meds include Amitriptyline, Lasix, Singulair (all chronic meds)  Consider holding one or more of these meds given epistaxis and BRBPR  Consider Platelet transfusion  Continue to follow Platelet counts

## 2021-07-07 NOTE — CARE PLAN
Problem: Fall Risk - Rehab  Goal: Patient will remain free from falls  Note: Call light with in reach. Redirection to use call light for assistance. Non skid socks. Upper siderails up x2 while in bed. Received report patient had diarrhea, encouraged increase fluids intake. Continues ABT IV, patient's own med supply. Able to make needs known. Assisted as needed. Will monitor.   The patient is Stable - Low risk of patient condition declining or worsening    Shift Goals  Patient Goals: Sleep well

## 2021-07-07 NOTE — PROGRESS NOTES
"Rehab Progress Note     Encounter Date: 7/7/2021    CC: Orthostatic hypotension, syncope    Interval Events (Subjective)  Patient sitting up in room. Patient with rapid response with this AM. Patient was responsive afterwards and now sitting in bed comfortably. She reports she is feeling fine. Discussed with hospitalist and plan on increasing florinef. Discussed about timing and want to spread out her florinef and steroid. Notified about low platelet count this AM.  Per hospitalist send recheck, still at 30. Per patient her outside MD wants to recheck nasal swab for sinus infection so that she might be able to stop the Xerava. Nursing to collect swab.     IDT Team Meeting 7/1/2021  DC/Disposition:  7/8/21 -> 7/12/21    Objective:  VITAL SIGNS: /80   Pulse 67   Temp 36.4 °C (97.5 °F) (Temporal)   Resp 18   Ht 1.626 m (5' 4\")   Wt 100 kg (220 lb 10.9 oz)   LMP  (LMP Unknown)   SpO2 92%   BMI 37.88 kg/m²    Gen: NAD  Psych: Mood and affect appropriate  CV: RRR, no edema  Resp: CTAB, no upper airway sounds  Abd: NTND  Neuro: AOx4, following commands  Unchanged from 7/6/21 including stable vitals    Recent Results (from the past 72 hour(s))   Group A Strep by PCR    Collection Time: 07/05/21 12:23 PM   Result Value Ref Range    Group A Strep by PCR Not Detected Not Detected   Comp Metabolic Panel    Collection Time: 07/06/21  5:16 AM   Result Value Ref Range    Sodium 141 135 - 145 mmol/L    Potassium 4.0 3.6 - 5.5 mmol/L    Chloride 102 96 - 112 mmol/L    Co2 29 20 - 33 mmol/L    Anion Gap 10.0 7.0 - 16.0    Glucose 102 (H) 65 - 99 mg/dL    Bun 8 8 - 22 mg/dL    Creatinine 0.57 0.50 - 1.40 mg/dL    Calcium 7.6 (L) 8.5 - 10.5 mg/dL    AST(SGOT) 102 (H) 12 - 45 U/L    ALT(SGPT) 113 (H) 2 - 50 U/L    Alkaline Phosphatase 300 (H) 30 - 99 U/L    Total Bilirubin 4.6 (H) 0.1 - 1.5 mg/dL    Albumin 1.9 (L) 3.2 - 4.9 g/dL    Total Protein 3.5 (L) 6.0 - 8.2 g/dL    Globulin 1.6 (L) 1.9 - 3.5 g/dL    A-G Ratio 1.2 " g/dL   MAGNESIUM    Collection Time: 07/06/21  5:16 AM   Result Value Ref Range    Magnesium 1.6 1.5 - 2.5 mg/dL   PHOSPHORUS    Collection Time: 07/06/21  5:16 AM   Result Value Ref Range    Phosphorus 2.4 (L) 2.5 - 4.5 mg/dL   ESTIMATED GFR    Collection Time: 07/06/21  5:16 AM   Result Value Ref Range    GFR If African American >60 >60 mL/min/1.73 m 2    GFR If Non African American >60 >60 mL/min/1.73 m 2   Basic Metabolic Panel    Collection Time: 07/07/21  5:38 AM   Result Value Ref Range    Sodium 140 135 - 145 mmol/L    Potassium 3.9 3.6 - 5.5 mmol/L    Chloride 101 96 - 112 mmol/L    Co2 30 20 - 33 mmol/L    Glucose 93 65 - 99 mg/dL    Bun 8 8 - 22 mg/dL    Creatinine 0.39 (L) 0.50 - 1.40 mg/dL    Calcium 7.6 (L) 8.5 - 10.5 mg/dL    Anion Gap 9.0 7.0 - 16.0   ESTIMATED GFR    Collection Time: 07/07/21  5:38 AM   Result Value Ref Range    GFR If African American >60 >60 mL/min/1.73 m 2    GFR If Non African American >60 >60 mL/min/1.73 m 2   CBC WITHOUT DIFFERENTIAL    Collection Time: 07/07/21  8:45 AM   Result Value Ref Range    WBC 6.5 4.8 - 10.8 K/uL    RBC 4.24 4.20 - 5.40 M/uL    Hemoglobin 12.7 12.0 - 16.0 g/dL    Hematocrit 38.1 37.0 - 47.0 %    MCV 89.9 81.4 - 97.8 fL    MCH 30.0 27.0 - 33.0 pg    MCHC 33.3 (L) 33.6 - 35.0 g/dL    RDW 61.1 (H) 35.9 - 50.0 fL    Platelet Count 30 (LL) 164 - 446 K/uL   PERIPHERAL SMEAR REVIEW    Collection Time: 07/07/21  8:45 AM   Result Value Ref Range    Peripheral Smear Review see below    IMMATURE PLT FRACTION    Collection Time: 07/07/21  8:45 AM   Result Value Ref Range    Imm. Plt Fraction 32.1 (H) 0.6 - 13.1 K/uL   POCT glucose device results    Collection Time: 07/07/21  9:31 AM   Result Value Ref Range    Glucose - Accu-Ck 83 65 - 99 mg/dL   proBrain Natriuretic Peptide, NT    Collection Time: 07/07/21  9:45 AM   Result Value Ref Range    NT-proBNP 314 (H) 0 - 125 pg/mL   TROPONIN    Collection Time: 07/07/21  9:45 AM   Result Value Ref Range    Troponin T 7  6 - 19 ng/L       Current Facility-Administered Medications   Medication Frequency   • fludrocortisone (FLORINEF) tablet 0.1 mg BID   • phosphorus (K-Phos-Neutral) per tablet 500 mg TID   • hydrophilic ointment (AQUAPHOR) OINT BID   • benzocaine (ANBESOL/ORAJEL) 20 % gel PRN   • Somatropin SOLR 0.3 mg QHS   • montelukast (SINGULAIR) tablet 10 mg Q EVENING   • furosemide (LASIX) tablet 40 mg Q DAY   • potassium chloride SA (Kdur) tablet 40 mEq DAILY   • senna-docusate (PERICOLACE or SENOKOT S) 8.6-50 MG per tablet 2 tablet BID PRN   • Eravacycline Dihydrochloride (XERAVA) 100 mg in  mL IVPB Q12HRS   • polyethylene glycol/lytes (MIRALAX) PACKET 1 Packet QDAY PRN   • magnesium hydroxide (MILK OF MAGNESIA) suspension 30 mL QDAY PRN   • bisacodyl (DULCOLAX) suppository 10 mg QDAY PRN   • ondansetron (ZOFRAN ODT) dispertab 4 mg 4X/DAY PRN   • ondansetron (ZOFRAN) syringe/vial injection 4 mg 4X/DAY PRN   • hydrocortisone (CORTEF) tablet 10 mg DAILY   • hydrocortisone (CORTEF) tablet 20 mg DAILY   • vitamin D (cholecalciferol) tablet 2,000 Units DAILY   • tizanidine (ZANAFLEX) tablet 4 mg QDAY PRN   • SUMAtriptan (IMITREX) tablet 50 mg Q2HRS PRN   • omeprazole (PRILOSEC) capsule 20 mg DAILY   • liothyronine (CYTOMEL) tablet 25 mcg QHS   • levothyroxine (SYNTHROID) tablet 75 mcg AM ES   • gabapentin (NEURONTIN) capsule 400 mg BID   • DULoxetine (CYMBALTA) capsule 60 mg QHS   • colesevelam (WELCHOL) tablet 625 mg TID   • calcitonin (salmon) (MIACALCIN) nasal spray 200 Units QHS   • apixaban (ELIQUIS) tablet 5 mg BID   • amitriptyline (ELAVIL) tablet 10 mg DAILY   • sodium chloride (OCEAN) 0.65 % nasal spray 2 Spray PRN   • traZODone (DESYREL) tablet 50 mg QHS PRN   • mag hydrox-al hydrox-simeth (MAALOX PLUS ES or MYLANTA DS) suspension 20 mL Q2HRS PRN   • benzocaine-menthol (CEPACOL) lozenge 1 Lozenge Q2HRS PRN   • artificial tears ophthalmic solution 1 Drop PRN   • acetaminophen (Tylenol) tablet 650 mg Q4HRS PRN   •  hydrALAZINE (APRESOLINE) tablet 25 mg Q8HRS PRN   • lidocaine (LIDODERM) 5 % 1 Patch DAILY       Orders Placed This Encounter   Procedures   • Diet Order Diet: Regular     Standing Status:   Standing     Number of Occurrences:   1     Order Specific Question:   Diet:     Answer:   Regular [1]       Assessment:  Active Hospital Problems    Diagnosis    • Syncope    • Transaminitis    • High potassium    • History of pulmonary embolism    • Adrenal insufficiency (Cortland's disease) ([facility])    • Hypertension    • Tachycardia    • Orthostatic hypotension    • Hypothyroidism    • Chronic systolic heart failure (HCC)    • Gastroesophageal reflux disease without esophagitis    • Depression        Medical Decision Making and Plan:  Orthostatic hypotension - Probably a combination of volume depletion and underlying Cortland's disease, was started on Florinef at [facility] as well as SILVERIO hoses and abdominal binder  -PT and OT for mobility and ADLs  -Continue SILVERIO hoses and abdominal binder  -On Florinef 0.1 mg daily. Has CHF so needs to be on ARB and BB at lowest dose. Consider midodrine.   -Consult hospitalist. Syncope on day of evaluation while in shower 6/29  -Increase Florinef to BID, early AM and noon.      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 Now on Florinef. Will monitor electrolytes.   -Consult Hospitalist, considering increase in Hydrocortisone level. Currently 20 mg qAM and 10 mg q 1PM. Also changed Florinef to earlier morning dose  -Patient to bring in home medications prescribed by Endocrinologist      Incontinence - Patient with bowel and bladder incontinence which I typically do not associate with autonomic dysfunction but was present in literature. Removed bowel medications and any laxatives. She is on Lasix.  Will need follow-up. She has not had any spinal imaging (weakness, orthostatic hypotension, incontinence) which might be a consideration as outpatient.      CHF - Patient on Coreg 3.125 mg and  Losartan 25 mg daily  -Coreg held for repeat syncopal episodes.      Neuropathy - Patient on Gabapentin 400 mg BID     HLD - Patient on welchol.      CKD - Avoid nephrotoxic agents. Check AM CMP 0.61, stable     Anemia - Check AM CBC - resolved     Mood/Depression/Pain - Patient is on Cymbalta 60 mg and Amitriptyline 10 mg daily.      Elevated LFTs - Elevated on admission into 200s. Will monitor, was previously downtrending.   -Into 100s on repeat, will monitor    Asthma/COPD - Patient on Singulair on transfer.      Hypothyroidism - Patient on Levothyroxine 75 mcg and Cytomel 25 mcg QHS. Low TSH on admission, normal T4    MRSA sinusitis - Prolonged treatment, with multiple allergies to antibiotics. On Eravacycline 100 mg BID. Working with pharmacy on prescription of medication  -Nasal wound culture     Morbid Obesity due to excess calories - BMI of 37.84 on admission, meets medical criteria. Dietitian to follow     GERD -Patient on omeprazole on transfer.      Hx of PE - Patient on Eliquis    Total time:  36 minutes.  I spent greater than 50% of the time for patient care, counseling, and coordination on this date, including unit/floor time, and face-to-face time with the patient as per interval events and assessment and plan above. Topics discussed included orthostatic hypotension with syncopal episode, discussed with hospitalist at bedside, increase florinef, nasal swab for sinusitis and continue Abx.     Darion Metzger M.D.

## 2021-07-07 NOTE — THERAPY
Physical Therapy   Daily Treatment     Patient Name: Donna Isaac  Age:  56 y.o., Sex:  female  Medical Record #: [id number]  Today's Date: 7/7/2021     Precautions  Precautions: Fall Risk, Other (See Comments)  Comments: Hx of syncopal episodes, Orthostatic Hypotension, abdominal binder OOB, R ankle brace, [place] (deaf R ear)    Subjective    Patient agreeable to PT.     Objective       07/07/21 1401   Gait Functional Level of Assist    Gait Level Of Assist Unable to Participate   Distance (Feet) 0   # of Times Distance was Traveled 0   Deviation   (Unable. Limited due to Medical condition.)   Wheelchair Functional Level of Assist   Wheelchair Assist Total Assist   Distance Wheelchair (Feet or Distance) 0   Wheelchair Description   (Total A from staff)   Stairs Functional Level of Assist   Level of Assist with Stairs Unable to Participate   # of Stairs Climbed 0   Stairs Description   (Unable. Limited due to Medical condition.)   Transfer Functional Level of Assist   Bed, Chair, Wheelchair Transfer Contact Guard Assist  (SBA-CGA)   Bed Chair Wheelchair Transfer Description Increased time;Set-up of equipment;Supervision for safety  (gait belt, bed rail)   Sitting Lower Body Exercises   Ankle Pumps 3 sets of 10  (5 lbs BLE)   Hip Flexion 3 sets of 10  (5 lbs BLE; limited clearance B)   Long Arc Quad 3 sets of 10  (5 lbs BLE)   Nustep Resistance Level 4  (6 reps for about 12-13 min total active over a 25 min period)   Bed Mobility    Supine to Sit Stand by Assist   Sit to Supine Minimal Assist   Sit to Stand   (SBA-CGA)   Scooting Supervised   Rolling Supervised   Interdisciplinary Plan of Care Collaboration   Patient Position at End of Therapy In Bed;Call Light within Reach;Tray Table within Reach;Phone within Reach   PT Total Time Spent   PT Individual Total Time Spent (Mins) 60   PT Charge Group   PT Therapeutic Exercise 3   PT Therapeutic Activities 1     Reviewed treatment goals and  POC.    Assessment    Patient limited with standing participation; good motivation; improving BLE strength but limited with B hip flexion; BLE edema continues but pt compliant with SILVERIO hose and abdominal binder.    Strengths: Able to follow instructions, Motivated for self care and independence, Independent prior level of function, Good insight into deficits/needs, Willingly participates in therapeutic activities, Pleasant and cooperative  Barriers: Decreased endurance, Generalized weakness, Orthostatic hypotension, Impaired balance, Limited mobility    Plan    Ambulation, Ther ex for strengthening and endurance, Standing tolerance and standing balance, Education.  D/C scheduled for 7/12/21.    Passport items to be completed:  Passport items to be completed:  Get in/out of bed safely, in/out of a vehicle, safely use mobility device, walk or wheel around home/community, navigate up and down stairs, show how to get up/down from the ground, ensure home is accessible, demonstrate HEP, complete caregiver training    Physical Therapy Problems (Active)     Problem: Mobility     Dates: Start: 07/01/21       Goal: STG-Within one week, patient will ambulate household distance of 150 feet with gait belt and SBA.     Dates: Start: 07/01/21          Goal: STG-Within one week, patient will ambulate up/down flight of stairs with B railings, gait belt, and CGA.     Dates: Start: 07/01/21             Problem: Mobility Transfers     Dates: Start: 06/29/21       Goal: STG-Within one week, patient will transfer bed to chair At [facility] with LRD In order to maximize self mobility     Dates: Start: 06/29/21       Goal Note filed on 07/01/21 1131 by Luis Morales, PT     CGA with transfers               Problem: PT-Long Term Goals     Dates: Start: 06/29/21       Goal: LTG-By discharge, patient will ambulate 150 ft With LRD at mod I in order to progress towards PLOF      Dates: Start: 06/29/21          Goal: LTG-By discharge, patient  will transfer one surface to another At mod I with LRD In order to maximize self mobility     Dates: Start: 06/29/21          Goal: LTG-By discharge, patient will ambulate up/down flight of stairs With single HR at [facility] in order to enter/exit home safely     Dates: Start: 06/29/21

## 2021-07-07 NOTE — DISCHARGE PLANNING
Per Danika at [facility] Physical Therapy, they are unable to accept referral as care exceeds capacity.  CM notified.

## 2021-07-07 NOTE — FLOWSHEET NOTE
07/07/21 1134   Events/Summary/Plan   Events/Summary/Plan 02 spot check, oxygen removed so pt on RA again   Vital Signs   Pulse 67   Respiration 18   Pulse Oximetry 92 %   $ Pulse Oximetry (Spot Check) Yes   Respiratory Assessment   Level of Consciousness Alert   Chest Exam   Work Of Breathing / Effort Within Normal Limits   Oxygen   O2 Delivery Device Room air w/o2 available

## 2021-07-08 ENCOUNTER — APPOINTMENT (OUTPATIENT)
Dept: RADIOLOGY | Facility: MEDICAL CENTER | Age: 56
DRG: 644 | End: 2021-07-08
Attending: HOSPITALIST
Payer: MEDICARE

## 2021-07-08 ENCOUNTER — APPOINTMENT (OUTPATIENT)
Dept: RADIOLOGY | Facility: REHABILITATION | Age: 56
DRG: 644 | End: 2021-07-08
Attending: HOSPITALIST
Payer: MEDICARE

## 2021-07-08 PROBLEM — E83.42 HYPOMAGNESEMIA: Status: ACTIVE | Noted: 2021-07-08

## 2021-07-08 PROBLEM — M25.452: Status: ACTIVE | Noted: 2021-07-08

## 2021-07-08 LAB
ALBUMIN SERPL BCP-MCNC: 2 G/DL (ref 3.2–4.9)
ALBUMIN/GLOB SERPL: 1.5 G/DL
ALP SERPL-CCNC: 291 U/L (ref 30–99)
ALT SERPL-CCNC: 90 U/L (ref 2–50)
ANION GAP SERPL CALC-SCNC: 12 MMOL/L (ref 7–16)
AST SERPL-CCNC: 87 U/L (ref 12–45)
BASOPHILS # BLD AUTO: 0.2 % (ref 0–1.8)
BASOPHILS # BLD: 0.01 K/UL (ref 0–0.12)
BILIRUB SERPL-MCNC: 6.2 MG/DL (ref 0.1–1.5)
BUN SERPL-MCNC: 10 MG/DL (ref 8–22)
CALCIUM SERPL-MCNC: 7.3 MG/DL (ref 8.5–10.5)
CHLORIDE SERPL-SCNC: 98 MMOL/L (ref 96–112)
CO2 SERPL-SCNC: 29 MMOL/L (ref 20–33)
COMMENT 1642: NORMAL
CREAT SERPL-MCNC: 0.53 MG/DL (ref 0.5–1.4)
EOSINOPHIL # BLD AUTO: 0 K/UL (ref 0–0.51)
EOSINOPHIL NFR BLD: 0 % (ref 0–6.9)
ERYTHROCYTE [DISTWIDTH] IN BLOOD BY AUTOMATED COUNT: 60.9 FL (ref 35.9–50)
GLOBULIN SER CALC-MCNC: 1.3 G/DL (ref 1.9–3.5)
GLUCOSE SERPL-MCNC: 99 MG/DL (ref 65–99)
GRAM STN SPEC: NORMAL
HCT VFR BLD AUTO: 32.3 % (ref 37–47)
HGB BLD-MCNC: 11 G/DL (ref 12–16)
IMM GRANULOCYTES # BLD AUTO: 0.02 K/UL (ref 0–0.11)
IMM GRANULOCYTES NFR BLD AUTO: 0.4 % (ref 0–0.9)
LYMPHOCYTES # BLD AUTO: 1.51 K/UL (ref 1–4.8)
LYMPHOCYTES NFR BLD: 31.9 % (ref 22–41)
MAGNESIUM SERPL-MCNC: 1.4 MG/DL (ref 1.5–2.5)
MCH RBC QN AUTO: 30.5 PG (ref 27–33)
MCHC RBC AUTO-ENTMCNC: 34.1 G/DL (ref 33.6–35)
MCV RBC AUTO: 89.5 FL (ref 81.4–97.8)
MONOCYTES # BLD AUTO: 0.44 K/UL (ref 0–0.85)
MONOCYTES NFR BLD AUTO: 9.3 % (ref 0–13.4)
MORPHOLOGY BLD-IMP: NORMAL
NEUTROPHILS # BLD AUTO: 2.76 K/UL (ref 2–7.15)
NEUTROPHILS NFR BLD: 58.2 % (ref 44–72)
NRBC # BLD AUTO: 0.04 K/UL
NRBC BLD-RTO: 0.8 /100 WBC
PHOSPHATE SERPL-MCNC: 4.6 MG/DL (ref 2.5–4.5)
PLATELET # BLD AUTO: 24 K/UL (ref 164–446)
PLATELETS.RETICULATED NFR BLD AUTO: 33.5 K/UL (ref 0.6–13.1)
POTASSIUM SERPL-SCNC: 4.3 MMOL/L (ref 3.6–5.5)
PROT SERPL-MCNC: 3.3 G/DL (ref 6–8.2)
RBC # BLD AUTO: 3.61 M/UL (ref 4.2–5.4)
SIGNIFICANT IND 70042: NORMAL
SITE SITE: NORMAL
SODIUM SERPL-SCNC: 139 MMOL/L (ref 135–145)
SOURCE SOURCE: NORMAL
WBC # BLD AUTO: 4.7 K/UL (ref 4.8–10.8)

## 2021-07-08 PROCEDURE — 700102 HCHG RX REV CODE 250 W/ 637 OVERRIDE(OP): Performed by: HOSPITALIST

## 2021-07-08 PROCEDURE — A9270 NON-COVERED ITEM OR SERVICE: HCPCS | Performed by: HOSPITALIST

## 2021-07-08 PROCEDURE — 99233 SBSQ HOSP IP/OBS HIGH 50: CPT | Mod: GC | Performed by: PHYSICAL MEDICINE & REHABILITATION

## 2021-07-08 PROCEDURE — 97116 GAIT TRAINING THERAPY: CPT

## 2021-07-08 PROCEDURE — A9270 NON-COVERED ITEM OR SERVICE: HCPCS | Performed by: PHYSICAL MEDICINE & REHABILITATION

## 2021-07-08 PROCEDURE — 80053 COMPREHEN METABOLIC PANEL: CPT

## 2021-07-08 PROCEDURE — 97110 THERAPEUTIC EXERCISES: CPT

## 2021-07-08 PROCEDURE — 97535 SELF CARE MNGMENT TRAINING: CPT

## 2021-07-08 PROCEDURE — 76882 US LMTD JT/FCL EVL NVASC XTR: CPT | Mod: LT

## 2021-07-08 PROCEDURE — 83735 ASSAY OF MAGNESIUM: CPT

## 2021-07-08 PROCEDURE — 700111 HCHG RX REV CODE 636 W/ 250 OVERRIDE (IP)

## 2021-07-08 PROCEDURE — 700102 HCHG RX REV CODE 250 W/ 637 OVERRIDE(OP): Performed by: PHYSICAL MEDICINE & REHABILITATION

## 2021-07-08 PROCEDURE — 700105 HCHG RX REV CODE 258

## 2021-07-08 PROCEDURE — 97530 THERAPEUTIC ACTIVITIES: CPT

## 2021-07-08 PROCEDURE — 85055 RETICULATED PLATELET ASSAY: CPT

## 2021-07-08 PROCEDURE — 770010 HCHG ROOM/CARE - REHAB SEMI PRIVAT*

## 2021-07-08 PROCEDURE — 700101 HCHG RX REV CODE 250: Performed by: PHYSICAL MEDICINE & REHABILITATION

## 2021-07-08 PROCEDURE — 99232 SBSQ HOSP IP/OBS MODERATE 35: CPT | Performed by: HOSPITALIST

## 2021-07-08 PROCEDURE — 84100 ASSAY OF PHOSPHORUS: CPT

## 2021-07-08 PROCEDURE — 85025 COMPLETE CBC W/AUTO DIFF WBC: CPT

## 2021-07-08 RX ORDER — MIDODRINE HYDROCHLORIDE 2.5 MG/1
5 TABLET ORAL
Status: DISCONTINUED | OUTPATIENT
Start: 2021-07-09 | End: 2021-07-12 | Stop reason: HOSPADM

## 2021-07-08 RX ADMIN — DULOXETINE 60 MG: 30 CAPSULE, DELAYED RELEASE ORAL at 21:42

## 2021-07-08 RX ADMIN — OMEPRAZOLE 20 MG: 20 CAPSULE, DELAYED RELEASE ORAL at 08:12

## 2021-07-08 RX ADMIN — Medication: at 22:59

## 2021-07-08 RX ADMIN — Medication 64 MG: at 11:49

## 2021-07-08 RX ADMIN — LEVOTHYROXINE SODIUM 75 MCG: 75 TABLET ORAL at 05:40

## 2021-07-08 RX ADMIN — HYDROCORTISONE 20 MG: 10 TABLET ORAL at 08:12

## 2021-07-08 RX ADMIN — SUMATRIPTAN SUCCINATE 50 MG: 25 TABLET ORAL at 08:12

## 2021-07-08 RX ADMIN — ACETAMINOPHEN 650 MG: 325 TABLET, FILM COATED ORAL at 16:45

## 2021-07-08 RX ADMIN — COLESEVELAM HYDROCHLORIDE 625 MG: 625 TABLET, FILM COATED ORAL at 08:13

## 2021-07-08 RX ADMIN — HYDROCORTISONE 10 MG: 10 TABLET ORAL at 15:10

## 2021-07-08 RX ADMIN — CHOLECALCIFEROL TAB 25 MCG (1000 UNIT) 2000 UNITS: 25 TAB at 08:11

## 2021-07-08 RX ADMIN — DIBASIC SODIUM PHOSPHATE, MONOBASIC POTASSIUM PHOSPHATE AND MONOBASIC SODIUM PHOSPHATE 500 MG: 852; 155; 130 TABLET ORAL at 08:12

## 2021-07-08 RX ADMIN — POTASSIUM CHLORIDE 40 MEQ: 1500 TABLET, EXTENDED RELEASE ORAL at 08:12

## 2021-07-08 RX ADMIN — FLUDROCORTISONE ACETATE 0.1 MG: 0.1 TABLET ORAL at 05:38

## 2021-07-08 RX ADMIN — APIXABAN 5 MG: 5 TABLET, FILM COATED ORAL at 21:42

## 2021-07-08 RX ADMIN — LIOTHYRONINE SODIUM 25 MCG: 25 TABLET ORAL at 21:43

## 2021-07-08 RX ADMIN — COLESEVELAM HYDROCHLORIDE 625 MG: 625 TABLET, FILM COATED ORAL at 21:43

## 2021-07-08 RX ADMIN — FLUDROCORTISONE ACETATE 0.1 MG: 0.1 TABLET ORAL at 11:50

## 2021-07-08 RX ADMIN — GABAPENTIN 400 MG: 400 CAPSULE ORAL at 21:43

## 2021-07-08 RX ADMIN — ACETAMINOPHEN 650 MG: 325 TABLET, FILM COATED ORAL at 11:49

## 2021-07-08 RX ADMIN — COLESEVELAM HYDROCHLORIDE 625 MG: 625 TABLET, FILM COATED ORAL at 15:10

## 2021-07-08 RX ADMIN — CALCITONIN SALMON 200 UNITS: 200 SPRAY, METERED NASAL at 21:45

## 2021-07-08 RX ADMIN — LIDOCAINE 1 PATCH: 50 PATCH TOPICAL at 08:13

## 2021-07-08 RX ADMIN — FUROSEMIDE 40 MG: 40 TABLET ORAL at 08:12

## 2021-07-08 RX ADMIN — AMITRIPTYLINE HYDROCHLORIDE 10 MG: 10 TABLET, FILM COATED ORAL at 08:13

## 2021-07-08 RX ADMIN — GABAPENTIN 400 MG: 400 CAPSULE ORAL at 08:13

## 2021-07-08 RX ADMIN — APIXABAN 5 MG: 5 TABLET, FILM COATED ORAL at 08:13

## 2021-07-08 RX ADMIN — MONTELUKAST 10 MG: 10 TABLET, FILM COATED ORAL at 21:43

## 2021-07-08 ASSESSMENT — ENCOUNTER SYMPTOMS
FEVER: 0
ABDOMINAL PAIN: 0
POLYDIPSIA: 0
NAUSEA: 0
SHORTNESS OF BREATH: 0
MUSCULOSKELETAL NEGATIVE: 1
PALPITATIONS: 0
CHILLS: 0
VOMITING: 0
BRUISES/BLEEDS EASILY: 0
EYES NEGATIVE: 1
COUGH: 0

## 2021-07-08 ASSESSMENT — PAIN DESCRIPTION - PAIN TYPE
TYPE: ACUTE PAIN
TYPE: ACUTE PAIN

## 2021-07-08 ASSESSMENT — GAIT ASSESSMENTS
ASSISTIVE DEVICE: FRONT WHEEL WALKER
GAIT LEVEL OF ASSIST: CONTACT GUARD ASSIST
DISTANCE (FEET): 30
DISTANCE (FEET): 0
DEVIATION: SHUFFLED GAIT
GAIT LEVEL OF ASSIST: UNABLE TO PARTICIPATE

## 2021-07-08 ASSESSMENT — ACTIVITIES OF DAILY LIVING (ADL)
TOILETING_LEVEL_OF_ASSIST_DESCRIPTION: GRAB BAR;INCREASED TIME;SUPERVISION FOR SAFETY;VERBAL CUEING
BED_CHAIR_WHEELCHAIR_TRANSFER_DESCRIPTION: ADAPTIVE EQUIPMENT;VERBAL CUEING
TOILET_TRANSFER_DESCRIPTION: GRAB BAR;SET-UP OF EQUIPMENT;SUPERVISION FOR SAFETY;VERBAL CUEING
TOILETING_LEVEL_OF_ASSIST_DESCRIPTION: GRAB BAR;INCREASED TIME;INITIAL PREPARATION FOR TASK;SUPERVISION FOR SAFETY;VERBAL CUEING
BED_CHAIR_WHEELCHAIR_TRANSFER_DESCRIPTION: ADAPTIVE EQUIPMENT;INCREASED TIME;SET-UP OF EQUIPMENT;SUPERVISION FOR SAFETY;VERBAL CUEING
TOILET_TRANSFER_DESCRIPTION: GRAB BAR;INCREASED TIME;SET-UP OF EQUIPMENT;SUPERVISION FOR SAFETY;VERBAL CUEING

## 2021-07-08 NOTE — THERAPY
Occupational Therapy  Daily Treatment     Patient Name: Donna Isaac  Age:  56 y.o., Sex:  female  Medical Record #: [id number]  Today's Date: 7/8/2021     Precautions  Precautions: Fall Risk, Other (See Comments)  Comments: Hx of syncopal episodes, Orthostatic Hypotension, abdominal binder OOB, R ankle brace, [place] (deaf R ear)    Safety   ADL Safety : Requires Physical Assist for Safety, Requires Supervision for Safety  Bathroom Safety: Requires Supervision for Safety, Requires Physical Assist for Safety  Comments: see below functional levels for ADL performance details.     Subjective       Objective       07/08/21 1431   Precautions   Precautions Fall Risk;Other (See Comments)   Comments Hx of syncopal episodes, Orthostatic Hypotension, abdominal binder OOB, R ankle brace, [place] (deaf R ear)   Vitals   O2 Delivery Device None - Room Air   Non Verbal Descriptors   Non Verbal Scale  Calm   Cognition    Level of Consciousness Alert   Sleep/Wake Cycle   Sleep & Rest Awake   Functional Level of Assist   Grooming Modified Independent;Seated   Grooming Description Increased time;Seated in wheelchair at sink   Lower Body Dressing Minimal Assist   Lower Body Dressing Description Reacher;Grab bar;Increased time;Initial preparation for task;Set-up of equipment;Supervision for safety;Verbal cueing   Toileting Contact Guard Assist   Toileting Description Grab bar;Increased time;Initial preparation for task;Supervision for safety;Verbal cueing   Toilet Transfers Contact Guard Assist   Toilet Transfer Description Grab bar;Set-up of equipment;Supervision for safety;Verbal cueing   Sitting Upper Body Exercises   Front Arm Raise 3 sets of 10  (3#'s)   Bicep Curls 3 sets of 10  (RUE 6#'s, LUE 5#'s)   Interdisciplinary Plan of Care Collaboration   IDT Collaboration with  Nursing   Patient Position at End of Therapy Seated;Self Releasing Lap Belt Applied;Chair Alarm On;Call Light within Reach;Tray Table within Reach;Phone within  Reach   Collaboration Comments CLOF   OT Total Time Spent   OT Individual Total Time Spent (Mins) 30   OT Charge Group   OT Self Care / ADL 1   OT Therapeutic Exercise  1       Assessment    Pt was alert and cooperative w/ tx.  Tx emphasis on ADL's at wc level and commode transfers + BUE TherEx seated - see notes above for details.  Strengths: Pleasant and cooperative, Willingly participates in therapeutic activities  Barriers: Decreased endurance, Fatigue, Generalized weakness, Hearing impairment, Orthostatic hypotension, Impaired activity tolerance, Impaired balance, Limited mobility    Plan    Cooking and/or laundry task with combination of standing and sitting, ADLs, IADLs, functional mobility, AE education, strength/endurance building, balance.     Passport items to be completed:  Passport items to be completed:  Perform bathroom transfers, complete dressing, complete feeding, get ready for the day, prepare a simple meal, participate in household tasks, adapt home for safety needs, demonstrate home exercise program, complete caregiver training     Occupational Therapy Goals (Active)     Problem: Dressing     Dates: Start: 07/08/21       Goal: STG-Within one week, patient will dress LB     Dates: Start: 07/08/21       Goal Note filed on 07/08/21 1206 by Dennis Alcantar MS,OTR/L     1) Individualized Goal:  Sup/SBA for LB clothing management via AE/DME PRN  2) Interventions:  OT Self Care/ADL, OT Neuro Re-Ed/Balance, OT Therapeutic Activity, OT Evaluation, and OT Therapeutic Exercise               Problem: Functional Transfers     Dates: Start: 07/08/21       Goal: STG-Within one week, patient will transfer to toilet     Dates: Start: 07/08/21       Goal Note filed on 07/08/21 1206 by Dennis Alcantar MS,OTR/L     1) Individualized Goal:  Sup/SBA for commode transfer via DME   2) Interventions: OT Self Care/ADL, OT Neuro Re-Ed/Balance, OT Therapeutic Activity, OT Evaluation, and OT Therapeutic Exercise                Problem: OT Long Term Goals     Dates: Start: 06/29/21       Goal: LTG-By discharge, patient will complete basic self care tasks     Dates: Start: 06/29/21       Goal Note filed on 06/29/21 0823 by Dennis Alcantar MS,OTR/L     1) Individualized Goal:  Mod I for BADL's via AE/DME PRN  2) Interventions:  OT Self Care/ADL, OT Neuro Re-Ed/Balance, OT Therapeutic Activity, OT Evaluation, and OT Therapeutic Exercise            Goal: LTG-By discharge, patient will perform bathroom transfers     Dates: Start: 06/29/21       Goal Note filed on 06/29/21 0823 by Dennis Alcantar MS,OTR/L     1) Individualized Goal:  Mod I for bathroom transfers via DME PRN  2) Interventions: OT Self Care/ADL, OT Neuro Re-Ed/Balance, OT Therapeutic Activity, OT Evaluation, and OT Therapeutic Exercise               Problem: Toileting     Dates: Start: 07/08/21       Goal: STG-Within one week, patient will complete toileting tasks     Dates: Start: 07/08/21       Goal Note filed on 07/08/21 1206 by Dennis Alcantar MS,OTR/L     1) Individualized Goal:  Sup/SBA for toileting task via DME PRN  2) Interventions:  OT Self Care/ADL, OT Neuro Re-Ed/Balance, OT Therapeutic Activity, OT Evaluation, and OT Therapeutic Exercise

## 2021-07-08 NOTE — CARE PLAN
Problem: Mobility Transfers  Goal: STG-Within one week, patient will transfer bed to chair At [facility] with LRD In order to maximize self mobility  Outcome: Not Met  Note: CGA-SBA due to frequency of syncopal episodes and/or hypotension     Problem: Mobility  Goal: STG-Within one week, patient will ambulate household distance of 150 feet with gait belt and SBA.  Outcome: Not Met  Note: Limited ability to work on ambulation due to frequency of syncopal episodes and/or hypotension  Goal: STG-Within one week, patient will ambulate up/down flight of stairs with B railings, gait belt, and CGA.  Outcome: Not Met  Note: Limited ability to work on ambulation due to frequency of syncopal episodes and/or hypotension

## 2021-07-08 NOTE — THERAPY
"Occupational Therapy  Daily Treatment     Patient Name: Donna Isaac  Age:  56 y.o., Sex:  female  Medical Record #: [id number]  Today's Date: 7/8/2021     Precautions  Precautions: Fall Risk, Other (See Comments)  Comments: Hx of syncopal episodes, Orthostatic Hypotension, abdominal binder OOB, R ankle brace, [place] (deaf R ear)    Safety   ADL Safety : Requires Physical Assist for Safety, Requires Supervision for Safety  Bathroom Safety: Requires Supervision for Safety, Requires Physical Assist for Safety  Comments: see below functional levels for ADL performance details.     Subjective       Objective       07/08/21 0831   Precautions   Precautions Fall Risk;Other (See Comments)   Comments Hx of syncopal episodes, Orthostatic Hypotension, abdominal binder OOB, R ankle brace, [place] (deaf R ear)   Vitals   Pulse 64   Patient BP Position Sitting   Blood Pressure 132/65   Respiration 18   Room Air Oximetry 98   O2 Delivery Device None - Room Air   Non Verbal Descriptors   Non Verbal Scale  Calm   Cognition    Level of Consciousness Alert   Sleep/Wake Cycle   Sleep & Rest Awake   Functional Level of Assist   Grooming Modified Independent;Seated   Grooming Description Increased time;Seated in wheelchair at sink   Upper Body Dressing Modified Independent   Upper Body Dressing Description Increased time   Lower Body Dressing Minimal Assist   Lower Body Dressing Description Reacher;Increased time;Initial preparation for task;Set-up of equipment;Supervision for safety;Verbal cueing   Toileting Contact Guard Assist   Toileting Description Grab bar;Increased time;Supervision for safety;Verbal cueing   Toilet Transfers Contact Guard Assist  (wc/commode)   Toilet Transfer Description Grab bar;Increased time;Set-up of equipment;Supervision for safety;Verbal cueing   Sitting Upper Body Exercises   Bilateral Row 3 sets of 10  (\"Equalizer\" 3x10@25#'s)   Upper Extremity Bike Level 2 Resistance  (FluidoBike, 20 mins, rest break x " 4, 2.101km)   Interdisciplinary Plan of Care Collaboration   IDT Collaboration with  Nursing   Patient Position at End of Therapy Seated;Self Releasing Lap Belt Applied;Call Light within Reach;Tray Table within Reach;Phone within Reach   Collaboration Comments CLOF   OT Total Time Spent   OT Individual Total Time Spent (Mins) 60   OT Charge Group   OT Self Care / ADL 2   OT Therapeutic Exercise  2       Assessment    Pt was alert and cooperative w/ tx.  Tx emphasis on ADL's at wc level and TherEx - see notes above for details.  Strengths: Pleasant and cooperative, Willingly participates in therapeutic activities  Barriers: Decreased endurance, Fatigue, Generalized weakness, Hearing impairment, Orthostatic hypotension, Impaired activity tolerance, Impaired balance, Limited mobility    Plan    Cooking and/or laundry task with combination of standing and sitting, ADLs, IADLs, functional mobility, AE education, strength/endurance building, balance.     Passport items to be completed:  Passport items to be completed:  Perform bathroom transfers, complete dressing, complete feeding, get ready for the day, prepare a simple meal, participate in household tasks, adapt home for safety needs, demonstrate home exercise program, complete caregiver training     Occupational Therapy Goals (Active)     Problem: Dressing     Dates: Start: 06/29/21       Goal: STG-Within one week, patient will dress LB     Dates: Start: 06/29/21       Goal Note filed on 07/01/21 1139 by Dennis Alcantar MS,OTR/L     Max assist to manage LB clothing - Total assist for SILVERIO hose and R ankle brace, Max assist to don shoes, Min assist to manage briefs               Problem: Functional Transfers     Dates: Start: 06/29/21       Goal: STG-Within one week, patient will transfer to toilet     Dates: Start: 06/29/21       Goal Note filed on 07/01/21 1139 by Dennis Alcantar MS,OTR/L     CGA via DME               Problem: OT Long Term Goals     Dates: Start: 06/29/21        Goal: LTG-By discharge, patient will complete basic self care tasks     Dates: Start: 06/29/21       Goal Note filed on 06/29/21 0823 by Dennis Alcantar MS,OTR/L     1) Individualized Goal:  Mod I for BADL's via AE/DME PRN  2) Interventions:  OT Self Care/ADL, OT Neuro Re-Ed/Balance, OT Therapeutic Activity, OT Evaluation, and OT Therapeutic Exercise            Goal: LTG-By discharge, patient will perform bathroom transfers     Dates: Start: 06/29/21       Goal Note filed on 06/29/21 0823 by Dennis Alcantar MS,OTR/L     1) Individualized Goal:  Mod I for bathroom transfers via DME PRN  2) Interventions: OT Self Care/ADL, OT Neuro Re-Ed/Balance, OT Therapeutic Activity, OT Evaluation, and OT Therapeutic Exercise               Problem: Toileting     Dates: Start: 06/29/21       Goal: STG-Within one week, patient will complete toileting tasks     Dates: Start: 06/29/21       Goal Note filed on 07/01/21 1139 by Dennis Alcantar MS,OTR/L     CGA/Min assist

## 2021-07-08 NOTE — CARE PLAN
The patient is Watcher - Medium risk of patient condition declining or worsening    Shift Goals  Patient Goals: Sleep well    Progress made toward(s) clinical / shift goals:        Problem: Knowledge Deficit - Standard  Goal: Patient and family/care givers will demonstrate understanding of plan of care, disease process/condition, diagnostic tests and medications  Outcome: Progressing     Problem: Fall Risk - Rehab  Goal: Patient will remain free from falls  Outcome: Progressing  At 1530, when CNA assisted patient to bathroom, she reported feeling lightheadedness standing and then fainted for about 10-15s. Patient was assisted to wheelchair and back to bed. VS taken. Patient verbalized feeling ok now. Dr Metzger notified.

## 2021-07-08 NOTE — PROGRESS NOTES
Physician's Interdisciplinary Team Conference Note    Nursing staff, Physical Therapist(s), Occupational Therapist(s), Case Management and Pharmacy staff were present and reported. Where appropriate Speech, Respiratory, and Recreational Therapists were present and reported.     I, Darion Metzger M.D., was present and led the interdisciplinary team conference on 7/8/2021.  I led the IDT conference and agree with the IDT conference documentation and plan of care as noted below.     RN:  Diet    % Meal     Pain Back pain   Sleep    Bowel Continent   Bladder Incontinent   In's & Out's      PT:  Bed Mobility    Transfers    Mobility ModA   Stairs    Physically very limited     OT:  Eating    Grooming    Bathing CGA   UB Dressing    LB Dressing modA   Toileting modA   Shower & Transfer    Most likely limited to wheelchair level  Plan to move bedroom to lower floor    CM:  Continues to work on disposition and DME needs.      DC/Disposition:  7/12/21    Above is an abridged version of the patient's status and plan of care.  For complete details please see Weekly Interdisciplinary Team Conference Note from this date.     All reporting clinicians have a working knowledge of this patient's plan of care.

## 2021-07-08 NOTE — THERAPY
Physical Therapy   Daily Treatment     Patient Name: Donna Isaac  Age:  56 y.o., Sex:  female  Medical Record #: [id number]  Today's Date: 7/8/2021     Precautions  Precautions: Fall Risk, Other (See Comments)  Comments: Hx of syncopal episodes, Orthostatic Hypotension, abdominal binder OOB, R ankle brace, [place] (deaf R ear)    Subjective    Patient agreeable to PT; CNA present in room taking vitals; pt in supine.     Objective     07/08/21 1545   Vitals   O2 (LPM) 0   O2 Delivery Device None - Room Air   Gait Functional Level of Assist    Gait Level Of Assist Unable to Participate   Distance (Feet) 0   # of Times Distance was Traveled 0   Deviation   (limited by medical just before PT session)   Transfer Functional Level of Assist   Bed, Chair, Wheelchair Transfer Contact Guard Assist   Bed Chair Wheelchair Transfer Description Adaptive equipment;Increased time;Set-up of equipment;Supervision for safety;Verbal cueing   Sitting Lower Body Exercises   Sitting Lower Body Exercises   (5 lbs BLE, difficult/purple exercise band)   Ankle Pumps 2 sets of 10   Hip Flexion 2 sets of 10 (limited AROM)   Hip Abduction 2 sets of 10   Long Arc Quad 2 sets of 10   Bed Mobility    Supine to Sit Supervised   Sit to Supine Minimal Assist   Sit to Stand Contact Guard Assist   Scooting Supervised   Rolling Supervised   IDT Conference   IDT Conference I have reviewed and discussed the POC and barriers to discharge for this patient.  I am knowledgeable of the patient's needs and have attended the IDT Conference.   Interdisciplinary Plan of Care Collaboration   IDT Collaboration with  Nursing;Certified Nursing Assistant   Patient Position at End of Therapy In Bed;Call Light within Reach;Tray Table within Reach;Phone within Reach   Collaboration Comments RN notified PT of near-syncopal episode with CNA in restroom prior to session; CNA taking BP with pt in supine at beginning of session   PT Total Time Spent   PT Individual Total  Time Spent (Mins) 30   PT Charge Group   PT Therapeutic Exercise 1   PT Therapeutic Activities 1       Assessment    Patient has good motivation but was limited once again due to a near-syncopal episode with CNA prior to PT session; discussed need to increase mobility and transfer focus at w/c level, reviewed POC; limited B hip flexion requires A with sit to supine.    Strengths: Able to follow instructions, Motivated for self care and independence, Independent prior level of function, Good insight into deficits/needs, Willingly participates in therapeutic activities, Pleasant and cooperative  Barriers: Decreased endurance, Generalized weakness, Orthostatic hypotension, Impaired balance, Limited mobility    Plan    Increase mobility focus at w/c level, Ther ex for strengthening and endurance, Standing tolerance as safety allows, Transfers, Education.  D/C scheduled for 7/12/21.    Passport items to be completed:  Passport items to be completed:  Get in/out of bed safely, in/out of a vehicle, safely use mobility device, walk or wheel around home/community, navigate up and down stairs, show how to get up/down from the ground, ensure home is accessible, demonstrate HEP, complete caregiver training    Physical Therapy Problems (Active)     Problem: Mobility     Dates: Start: 07/01/21       Goal: STG-Within one week, patient will ambulate household distance of 150 feet with gait belt and SBA.     Dates: Start: 07/01/21       Goal Note filed on 07/08/21 1049 by Luis Morales, PT     Limited ability to work on ambulation due to frequency of syncopal episodes and/or hypotension            Goal: STG-Within one week, patient will ambulate up/down flight of stairs with B railings, gait belt, and CGA.     Dates: Start: 07/01/21       Goal Note filed on 07/08/21 1049 by Lius Morales, PT     Limited ability to work on ambulation due to frequency of syncopal episodes and/or hypotension               Problem:  Mobility Transfers     Dates: Start: 06/29/21       Goal: STG-Within one week, patient will transfer bed to chair At \[facility]\"" with LRD In order to maximize self mobility     Dates: Start: 06/29/21       Goal Note filed on 07/08/21 1049 by Luis Morales, PT     CGA-SBA due to frequency of syncopal episodes and/or hypotension               Problem: PT-Long Term Goals     Dates: Start: 06/29/21       Goal: LTG-By discharge, patient will ambulate 150 ft With LRD at mod I in order to progress towards PLOF      Dates: Start: 06/29/21          Goal: LTG-By discharge, patient will transfer one surface to another At mod I with LRD In order to maximize self mobility     Dates: Start: 06/29/21          Goal: LTG-By discharge, patient will ambulate up/down flight of stairs With single HR at \[facility]\"" in order to enter/exit home safely     Dates: Start: 06/29/21

## 2021-07-08 NOTE — PROGRESS NOTES
Rehab Progress Note      Encounter Date: 7/8/2021     CC: Orthostatic hypotension, syncope     Interval Events (Subjective)  Patient sitting up in therapy gym.  Patient stated that she feels more energetic She reports she is feeling fine. She has insight that her mind thinks that she is able to perform more than her body can do. She reports left leg swelling and fullness. She denies nausea, vomit, Diarrhea.         IDT Team Meeting 7/1/2021  DC/Disposition:  7/8/21 -> 7/12/21     Objective:  VITAL SIGNS: Blood Pressure: 132/65, Pulse: 64, Respiration: 18, Temperature: 36.4 °C (97.5 °F), Pulse Oximetry: 98 %, O2 Delivery Device: None - Room Air     Gen: NAD  Psych: Mood and affect appropriate  CV: RRR, no edema  Resp: CTAB, no upper airway sounds  Abd: NTND  Neuro: AOx4, following commands  Unchanged from 7/7/21 including stable vitals     Recent Labs     07/07/21  0845 07/08/21  0550   WBC 6.5 4.7*   RBC 4.24 3.61*   HEMOGLOBIN 12.7 11.0*   HEMATOCRIT 38.1 32.3*   MCV 89.9 89.5   MCH 30.0 30.5   RDW 61.1* 60.9*   PLATELETCT 30* 24*   NEUTSPOLYS  --  58.20   LYMPHOCYTES  --  31.90   MONOCYTES  --  9.30   EOSINOPHILS  --  0.00   BASOPHILS  --  0.20     Recent Labs     07/06/21  0516 07/07/21  0538 07/08/21  0550   SODIUM 141 140 139   POTASSIUM 4.0 3.9 4.3   CHLORIDE 102 101 98   CO2 29 30 29   GLUCOSE 102* 93 99   BUN 8 8 10           Recent Results (from the past 72 hour(s))   Group A Strep by PCR     Collection Time: 07/05/21 12:23 PM   Result Value Ref Range     Group A Strep by PCR Not Detected Not Detected   Comp Metabolic Panel     Collection Time: 07/06/21  5:16 AM   Result Value Ref Range     Sodium 141 135 - 145 mmol/L     Potassium 4.0 3.6 - 5.5 mmol/L     Chloride 102 96 - 112 mmol/L     Co2 29 20 - 33 mmol/L     Anion Gap 10.0 7.0 - 16.0     Glucose 102 (H) 65 - 99 mg/dL     Bun 8 8 - 22 mg/dL     Creatinine 0.57 0.50 - 1.40 mg/dL     Calcium 7.6 (L) 8.5 - 10.5 mg/dL     AST(SGOT) 102 (H) 12 - 45 U/L      ALT(SGPT) 113 (H) 2 - 50 U/L     Alkaline Phosphatase 300 (H) 30 - 99 U/L     Total Bilirubin 4.6 (H) 0.1 - 1.5 mg/dL     Albumin 1.9 (L) 3.2 - 4.9 g/dL     Total Protein 3.5 (L) 6.0 - 8.2 g/dL     Globulin 1.6 (L) 1.9 - 3.5 g/dL     A-G Ratio 1.2 g/dL   MAGNESIUM     Collection Time: 07/06/21  5:16 AM   Result Value Ref Range     Magnesium 1.6 1.5 - 2.5 mg/dL   PHOSPHORUS     Collection Time: 07/06/21  5:16 AM   Result Value Ref Range     Phosphorus 2.4 (L) 2.5 - 4.5 mg/dL   ESTIMATED GFR     Collection Time: 07/06/21  5:16 AM   Result Value Ref Range     GFR If African American >60 >60 mL/min/1.73 m 2     GFR If Non African American >60 >60 mL/min/1.73 m 2   Basic Metabolic Panel     Collection Time: 07/07/21  5:38 AM   Result Value Ref Range     Sodium 140 135 - 145 mmol/L     Potassium 3.9 3.6 - 5.5 mmol/L     Chloride 101 96 - 112 mmol/L     Co2 30 20 - 33 mmol/L     Glucose 93 65 - 99 mg/dL     Bun 8 8 - 22 mg/dL     Creatinine 0.39 (L) 0.50 - 1.40 mg/dL     Calcium 7.6 (L) 8.5 - 10.5 mg/dL     Anion Gap 9.0 7.0 - 16.0   ESTIMATED GFR     Collection Time: 07/07/21  5:38 AM   Result Value Ref Range     GFR If African American >60 >60 mL/min/1.73 m 2     GFR If Non African American >60 >60 mL/min/1.73 m 2   CBC WITHOUT DIFFERENTIAL     Collection Time: 07/07/21  8:45 AM   Result Value Ref Range     WBC 6.5 4.8 - 10.8 K/uL     RBC 4.24 4.20 - 5.40 M/uL     Hemoglobin 12.7 12.0 - 16.0 g/dL     Hematocrit 38.1 37.0 - 47.0 %     MCV 89.9 81.4 - 97.8 fL     MCH 30.0 27.0 - 33.0 pg     MCHC 33.3 (L) 33.6 - 35.0 g/dL     RDW 61.1 (H) 35.9 - 50.0 fL     Platelet Count 30 (LL) 164 - 446 K/uL   PERIPHERAL SMEAR REVIEW     Collection Time: 07/07/21  8:45 AM   Result Value Ref Range     Peripheral Smear Review see below     IMMATURE PLT FRACTION     Collection Time: 07/07/21  8:45 AM   Result Value Ref Range     Imm. Plt Fraction 32.1 (H) 0.6 - 13.1 K/uL   POCT glucose device results     Collection Time: 07/07/21  9:31 AM    Result Value Ref Range     Glucose - Accu-Ck 83 65 - 99 mg/dL   proBrain Natriuretic Peptide, NT     Collection Time: 07/07/21  9:45 AM   Result Value Ref Range     NT-proBNP 314 (H) 0 - 125 pg/mL   TROPONIN     Collection Time: 07/07/21  9:45 AM   Result Value Ref Range     Troponin T 7 6 - 19 ng/L              Current Facility-Administered Medications   Medication Frequency   • fludrocortisone (FLORINEF) tablet 0.1 mg BID   • phosphorus (K-Phos-Neutral) per tablet 500 mg TID   • hydrophilic ointment (AQUAPHOR) OINT BID   • benzocaine (ANBESOL/ORAJEL) 20 % gel PRN   • Somatropin SOLR 0.3 mg QHS   • montelukast (SINGULAIR) tablet 10 mg Q EVENING   • furosemide (LASIX) tablet 40 mg Q DAY   • potassium chloride SA (Kdur) tablet 40 mEq DAILY   • senna-docusate (PERICOLACE or SENOKOT S) 8.6-50 MG per tablet 2 tablet BID PRN   • Eravacycline Dihydrochloride (XERAVA) 100 mg in  mL IVPB Q12HRS   • polyethylene glycol/lytes (MIRALAX) PACKET 1 Packet QDAY PRN   • magnesium hydroxide (MILK OF MAGNESIA) suspension 30 mL QDAY PRN   • bisacodyl (DULCOLAX) suppository 10 mg QDAY PRN   • ondansetron (ZOFRAN ODT) dispertab 4 mg 4X/DAY PRN   • ondansetron (ZOFRAN) syringe/vial injection 4 mg 4X/DAY PRN   • hydrocortisone (CORTEF) tablet 10 mg DAILY   • hydrocortisone (CORTEF) tablet 20 mg DAILY   • vitamin D (cholecalciferol) tablet 2,000 Units DAILY   • tizanidine (ZANAFLEX) tablet 4 mg QDAY PRN   • SUMAtriptan (IMITREX) tablet 50 mg Q2HRS PRN   • omeprazole (PRILOSEC) capsule 20 mg DAILY   • liothyronine (CYTOMEL) tablet 25 mcg QHS   • levothyroxine (SYNTHROID) tablet 75 mcg AM ES   • gabapentin (NEURONTIN) capsule 400 mg BID   • DULoxetine (CYMBALTA) capsule 60 mg QHS   • colesevelam (WELCHOL) tablet 625 mg TID   • calcitonin (salmon) (MIACALCIN) nasal spray 200 Units QHS   • apixaban (ELIQUIS) tablet 5 mg BID   • amitriptyline (ELAVIL) tablet 10 mg DAILY   • sodium chloride (OCEAN) 0.65 % nasal spray 2 Spray PRN   •  traZODone (DESYREL) tablet 50 mg QHS PRN   • mag hydrox-al hydrox-simeth (MAALOX PLUS ES or MYLANTA DS) suspension 20 mL Q2HRS PRN   • benzocaine-menthol (CEPACOL) lozenge 1 Lozenge Q2HRS PRN   • artificial tears ophthalmic solution 1 Drop PRN   • acetaminophen (Tylenol) tablet 650 mg Q4HRS PRN   • hydrALAZINE (APRESOLINE) tablet 25 mg Q8HRS PRN   • lidocaine (LIDODERM) 5 % 1 Patch DAILY               Orders Placed This Encounter   Procedures   • Diet Order Diet: Regular       Standing Status:   Standing       Number of Occurrences:   1       Order Specific Question:   Diet:       Answer:   Regular [1]         Assessment:       Active Hospital Problems     Diagnosis     • Syncope     • Transaminitis     • High potassium     • History of pulmonary embolism     • Adrenal insufficiency (Teton's disease) ([facility])     • Hypertension     • Tachycardia     • Orthostatic hypotension     • Hypothyroidism     • Chronic systolic heart failure (HCC)     • Gastroesophageal reflux disease without esophagitis     • Depression           Medical Decision Making and Plan:  Orthostatic hypotension - Probably a combination of volume depletion and underlying Teton's disease, was started on Florinef at [facility] as well as SILVERIO hoses and abdominal binder  -PT and OT for mobility and ADLs  -Continue SILVERIO hoses and abdominal binder  -On Florinef 0.1 mg daily. Has CHF so needs to be on ARB and BB at lowest dose. Consider midodrine.   -Consult hospitalist. Syncope on day of evaluation while in shower 6/29  -Increase Florinef to BID, early AM and noon.       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 Now on Florinef. Will monitor electrolytes.   -Consult Hospitalist, considering increase in Hydrocortisone level. Currently 20 mg qAM and 10 mg q 1PM. Also changed Florinef to earlier morning dose  Continue somatropin  -Patient to bring in home medications prescribed by Endocrinologist Dr Jeff     Incontinence - Patient with bowel  and bladder incontinence which I typically do not associate with autonomic dysfunction but was present in literature. Removed bowel medications and any laxatives. She is on Lasix.  Will need follow-up. She has not had any spinal imaging (weakness, orthostatic hypotension, incontinence) which might be a consideration as outpatient.       Hip swelling left-pending u/s r/o hematoma    CHF - Patient on Coreg 3.125 mg and Losartan 25 mg daily  -Coreg held for repeat syncopal episodes.      Neuropathy - Patient on Gabapentin 400 mg BID     HLD - Patient on welchol.      CKD - Avoid nephrotoxic agents. Check AM CMP 0.61, stable     Anemia - Check AM CBC - resolved     Thrombocytopenia 2/2 eliquis or Xerava  -continue monitor platelets and pending peripheral smear    Mood/Depression/Pain - Patient is on Cymbalta 60 mg and Amitriptyline 10 mg daily.      Elevated LFTs 2/2 hemochromatosis  - Elevated on admission into 200s. Will monitor, was previously downtrending.   -Into 100s on repeat, will monitor     Asthma/COPD - Patient on Singulair on transfer.      Hypothyroidism - Patient on Levothyroxine 75 mcg and Cytomel 25 mcg QHS. Low TSH on admission, normal T4     MRSA sinusitis - Prolonged treatment, with multiple allergies to antibiotics. On Eravacycline 100 mg BID. Working with pharmacy on prescription of medication  -Nasal wound culture  pending     Morbid Obesity due to excess calories - BMI of 37.84 on admission, meets medical criteria. Dietitian to follow     GERD -Patient on omeprazole on transfer.      Hx of PE - Patient on Eliquis

## 2021-07-08 NOTE — PROGRESS NOTES
[facility] Medicine Daily Progress Note      Chief Complaint:  Syncope  Orthostatic hypotension   Adrenal Insufficiency  Transaminitis    Interval History:  Pt reports feeling better than yesterday but c/o left hip/thigh pain and swelling.  Labs reviewed.     Review of Systems  Review of Systems   Constitutional: Negative for chills and fever.   HENT: Negative.    Eyes: Negative.    Respiratory: Negative for cough and shortness of breath.    Cardiovascular: Positive for leg swelling. Negative for chest pain and palpitations.   Gastrointestinal: Negative for abdominal pain, nausea and vomiting.   Musculoskeletal: Negative.    Skin: Negative for itching and rash.   Endo/Heme/Allergies: Negative for polydipsia. Does not bruise/bleed easily.        Physical Exam  Temp:  [36.4 °C (97.5 °F)-36.8 °C (98.3 °F)] 36.4 °C (97.5 °F)  Pulse:  [64-77] 64  Resp:  [16-18] 18  BP: (100-137)/(41-72) 132/65  SpO2:  [98 %] 98 %    Physical Exam  Vitals reviewed.   Constitutional:       General: She is not in acute distress.     Appearance: She is not ill-appearing.      Comments: Cushingoid   HENT:      Head: Normocephalic and atraumatic.      Right Ear: External ear normal.      Left Ear: External ear normal.      Nose: Nose normal.      Mouth/Throat:      Pharynx: Oropharynx is clear.   Eyes:      General:         Right eye: No discharge.         Left eye: No discharge.      Extraocular Movements: Extraocular movements intact.      Conjunctiva/sclera: Conjunctivae normal.   Neck:      Vascular: No JVD.   Cardiovascular:      Rate and Rhythm: Normal rate and regular rhythm.   Pulmonary:      Effort: Pulmonary effort is normal. No respiratory distress.      Breath sounds: Normal breath sounds. No stridor. No wheezing.   Abdominal:      General: Bowel sounds are normal. There is no distension.      Palpations: Abdomen is soft.      Tenderness: There is no abdominal tenderness.   Musculoskeletal:      Cervical back: Normal range of motion  and neck supple.      Right lower leg: Edema present.      Left lower leg: Edema present.      Comments: Left lateral hip/thigh fullness   Skin:     General: Skin is warm and dry.   Neurological:      Mental Status: She is alert and oriented to person, place, and time.         Fluids    Intake/Output Summary (Last 24 hours) at 7/8/2021 1149  Last data filed at 7/7/2021 2141  Gross per 24 hour   Intake 341 ml   Output --   Net 341 ml       Laboratory  Recent Labs     07/07/21  0845 07/08/21  0550   WBC 6.5 4.7*   RBC 4.24 3.61*   HEMOGLOBIN 12.7 11.0*   HEMATOCRIT 38.1 32.3*   MCV 89.9 89.5   MCH 30.0 30.5   MCHC 33.3* 34.1   RDW 61.1* 60.9*   PLATELETCT 30* 24*     Recent Labs     07/06/21  0516 07/07/21  0538 07/08/21  0550   SODIUM 141 140 139   POTASSIUM 4.0 3.9 4.3   CHLORIDE 102 101 98   CO2 29 30 29   GLUCOSE 102* 93 99   BUN 8 8 10   CREATININE 0.57 0.39* 0.53   CALCIUM 7.6* 7.6* 7.3*               Assessment/Plan  Hip swelling, left  Assessment & Plan  Pt c/o left hip/thigh pain/swelling  Request U/S R/O hematoma    Hypomagnesemia  Assessment & Plan  Start MgCl2    Thrombocytopenia (HCC)  Assessment & Plan  Suspect 2/2 Eliquis vs Xerava  Consider holding one of these meds  Transfuse Platelets if counts<10k or active bleeding  Follow Platelet counts    Hypophosphatemia  Assessment & Plan  Phosphorus levels corrected  Discontinue Neutra-Phos supplements    Hemochromatosis  Assessment & Plan  Has waxing/waning transaminitis  Hepatitis Panel negative  U/S RUQ hepatic steatosis or hepatocellular disease    History of pulmonary embolism- (present on admission)  Assessment & Plan  Suspected 2/2 COVID-19 vaccine per [facility]  Anticoagulated on Eliquis    MRSA (methicillin resistant Staphylococcus aureus) sinus infection- (present on admission)  Assessment & Plan  On long term Xerava per ID  Repeat Sinus Culture in progress    Adrenal insufficiency (Clayton's disease) (HCC)- (present on admission)  Assessment & Plan  On  Florinef, Hydrocortisone, and Somatropin  Outpt Endocrine F/U w/ Dr. Jeff [phone]    Orthostatic hypotension- (present on admission)  Assessment & Plan  May be etiology of her syncope  Has chronic history, suspect 2/2 AI  Florinef increased from qd to bid dosing  Continue Hydrocortisone  Now off Losartan and Coreg    Hypothyroidism- (present on admission)  Assessment & Plan  TSH 0.02 (low) and FT4 1.27 (normal)  On Synthroid and Cytomel  Outpt Endocrine F/U    Depression- (present on admission)  Assessment & Plan  On Amitriptyline and Cymbalta    Chronic systolic heart failure (HCC)- (present on admission)  Assessment & Plan  EF range has been as low as 20% and as high as 75%  Continue Lasix as tolerated by blood pressure and renal function  Outpt Cardiology F/U    Gastroesophageal reflux disease without esophagitis- (present on admission)  Assessment & Plan  On Prilosec    Full Code

## 2021-07-08 NOTE — CARE PLAN
Problem: Pain - Standard  Goal: Alleviation of pain or a reduction in pain to the patient’s comfort goal  Note: Educate patient of non-pharmacological comfort measures: repositioning, relaxation/breathing technique, cold compress and activities.      Problem: Fall Risk - Rehab  Goal: Patient will remain free from falls  Note: Call light with in reach. Redirection to use call light for assistance. Non skid socks. Upper siderails up x2 while in bed. Continues IV ABT. ELBA picc line intact. Will monitor.   The patient is Stable - Low risk of patient condition declining or worsening    Shift Goals  Patient Goals: Sleep well

## 2021-07-09 ENCOUNTER — HOSPITAL ENCOUNTER (INPATIENT)
Facility: MEDICAL CENTER | Age: 56
LOS: 21 days | DRG: 441 | End: 2021-07-30
Attending: EMERGENCY MEDICINE | Admitting: FAMILY MEDICINE
Payer: MEDICARE

## 2021-07-09 ENCOUNTER — APPOINTMENT (OUTPATIENT)
Dept: RADIOLOGY | Facility: MEDICAL CENTER | Age: 56
DRG: 441 | End: 2021-07-09
Attending: FAMILY MEDICINE
Payer: MEDICARE

## 2021-07-09 ENCOUNTER — APPOINTMENT (OUTPATIENT)
Dept: RADIOLOGY | Facility: MEDICAL CENTER | Age: 56
DRG: 441 | End: 2021-07-09
Attending: EMERGENCY MEDICINE
Payer: MEDICARE

## 2021-07-09 VITALS
HEART RATE: 103 BPM | WEIGHT: 220.68 LBS | HEIGHT: 64 IN | RESPIRATION RATE: 19 BRPM | TEMPERATURE: 98.5 F | BODY MASS INDEX: 37.68 KG/M2 | OXYGEN SATURATION: 97 % | DIASTOLIC BLOOD PRESSURE: 82 MMHG | SYSTOLIC BLOOD PRESSURE: 112 MMHG

## 2021-07-09 DIAGNOSIS — K92.2 GASTROINTESTINAL HEMORRHAGE, UNSPECIFIED GASTROINTESTINAL HEMORRHAGE TYPE: ICD-10-CM

## 2021-07-09 DIAGNOSIS — R60.1 ANASARCA: ICD-10-CM

## 2021-07-09 DIAGNOSIS — K72.00 ACUTE LIVER FAILURE WITHOUT HEPATIC COMA: ICD-10-CM

## 2021-07-09 DIAGNOSIS — K55.9 ISCHEMIC COLITIS (HCC): ICD-10-CM

## 2021-07-09 DIAGNOSIS — D69.6 THROMBOCYTOPENIA (HCC): ICD-10-CM

## 2021-07-09 PROBLEM — R32 INCONTINENCE: Status: ACTIVE | Noted: 2021-07-09

## 2021-07-09 PROBLEM — R04.0 EPISTAXIS: Status: ACTIVE | Noted: 2021-07-09

## 2021-07-09 PROBLEM — E43 SEVERE PROTEIN-CALORIE MALNUTRITION (HCC): Status: ACTIVE | Noted: 2021-07-09

## 2021-07-09 PROBLEM — R10.9 ABDOMINAL PAIN: Status: ACTIVE | Noted: 2021-07-09

## 2021-07-09 PROBLEM — B37.0 THRUSH: Status: ACTIVE | Noted: 2021-07-09

## 2021-07-09 PROBLEM — E87.8 ELECTROLYTE IMBALANCE: Status: ACTIVE | Noted: 2021-07-09

## 2021-07-09 LAB
ABO GROUP BLD: NORMAL
ALBUMIN SERPL BCP-MCNC: 2 G/DL (ref 3.2–4.9)
ALBUMIN/GLOB SERPL: 1.4 G/DL
ALP SERPL-CCNC: 324 U/L (ref 30–99)
ALT SERPL-CCNC: 86 U/L (ref 2–50)
ANION GAP SERPL CALC-SCNC: 16 MMOL/L (ref 7–16)
ANION GAP SERPL CALC-SCNC: 16 MMOL/L (ref 7–16)
APTT PPP: 52.3 SEC (ref 24.7–36)
AST SERPL-CCNC: 99 U/L (ref 12–45)
BARCODED ABORH UBTYP: 5100
BARCODED PRD CODE UBPRD: NORMAL
BARCODED UNIT NUM UBUNT: NORMAL
BASOPHILS # BLD AUTO: 0 % (ref 0–1.8)
BASOPHILS # BLD AUTO: 0.1 % (ref 0–1.8)
BASOPHILS # BLD: 0 K/UL (ref 0–0.12)
BASOPHILS # BLD: 0.01 K/UL (ref 0–0.12)
BILIRUB SERPL-MCNC: 7.6 MG/DL (ref 0.1–1.5)
BLD GP AB SCN SERPL QL: NORMAL
BUN SERPL-MCNC: 11 MG/DL (ref 8–22)
BUN SERPL-MCNC: 14 MG/DL (ref 8–22)
CALCIUM SERPL-MCNC: 7.3 MG/DL (ref 8.5–10.5)
CALCIUM SERPL-MCNC: 7.3 MG/DL (ref 8.5–10.5)
CFT BLD TEG: 6.8 MIN (ref 5–10)
CHLORIDE SERPL-SCNC: 94 MMOL/L (ref 96–112)
CHLORIDE SERPL-SCNC: 95 MMOL/L (ref 96–112)
CLOT ANGLE BLD TEG: 57.2 DEGREES (ref 53–72)
CLOT LYSIS 30M P MA LENFR BLD TEG: 0 % (ref 0–8)
CO2 SERPL-SCNC: 22 MMOL/L (ref 20–33)
CO2 SERPL-SCNC: 23 MMOL/L (ref 20–33)
COMMENT 1642: NORMAL
COMPONENT R 8504R: NORMAL
CREAT SERPL-MCNC: 0.78 MG/DL (ref 0.5–1.4)
CREAT SERPL-MCNC: 1.12 MG/DL (ref 0.5–1.4)
CT.EXTRINSIC BLD ROTEM: 3.3 MIN (ref 1–3)
EKG IMPRESSION: NORMAL
EOSINOPHIL # BLD AUTO: 0 K/UL (ref 0–0.51)
EOSINOPHIL # BLD AUTO: 0.01 K/UL (ref 0–0.51)
EOSINOPHIL NFR BLD: 0 % (ref 0–6.9)
EOSINOPHIL NFR BLD: 0.1 % (ref 0–6.9)
ERYTHROCYTE [DISTWIDTH] IN BLOOD BY AUTOMATED COUNT: 59.4 FL (ref 35.9–50)
ERYTHROCYTE [DISTWIDTH] IN BLOOD BY AUTOMATED COUNT: 59.6 FL (ref 35.9–50)
ERYTHROCYTE [DISTWIDTH] IN BLOOD BY AUTOMATED COUNT: 60.7 FL (ref 35.9–50)
FERRITIN SERPL-MCNC: 136 NG/ML (ref 10–291)
GLOBULIN SER CALC-MCNC: 1.4 G/DL (ref 1.9–3.5)
GLUCOSE BLD-MCNC: 123 MG/DL (ref 65–99)
GLUCOSE BLD-MCNC: 33 MG/DL (ref 65–99)
GLUCOSE BLD-MCNC: 98 MG/DL (ref 65–99)
GLUCOSE SERPL-MCNC: 163 MG/DL (ref 65–99)
GLUCOSE SERPL-MCNC: 59 MG/DL (ref 65–99)
HCT VFR BLD AUTO: 32.3 % (ref 37–47)
HCT VFR BLD AUTO: 34.9 % (ref 37–47)
HCT VFR BLD AUTO: 35.8 % (ref 37–47)
HGB BLD-MCNC: 11 G/DL (ref 12–16)
HGB BLD-MCNC: 11.9 G/DL (ref 12–16)
HGB BLD-MCNC: 11.9 G/DL (ref 12–16)
HGB RETIC QN AUTO: 30.2 PG/CELL (ref 29–35)
IMM GRANULOCYTES # BLD AUTO: 0.04 K/UL (ref 0–0.11)
IMM GRANULOCYTES # BLD AUTO: 0.05 K/UL (ref 0–0.11)
IMM GRANULOCYTES NFR BLD AUTO: 0.6 % (ref 0–0.9)
IMM GRANULOCYTES NFR BLD AUTO: 0.7 % (ref 0–0.9)
IMM RETICS NFR: 38.1 % (ref 9.3–17.4)
INR PPP: 2.49 (ref 0.87–1.13)
IRON SATN MFR SERPL: 87 % (ref 15–55)
IRON SERPL-MCNC: 113 UG/DL (ref 40–170)
LACTATE BLD-SCNC: 6.5 MMOL/L (ref 0.5–2)
LACTATE BLD-SCNC: 7.6 MMOL/L (ref 0.5–2)
LACTATE BLD-SCNC: 7.9 MMOL/L (ref 0.5–2)
LYMPHOCYTES # BLD AUTO: 1 K/UL (ref 1–4.8)
LYMPHOCYTES # BLD AUTO: 1.35 K/UL (ref 1–4.8)
LYMPHOCYTES NFR BLD: 13.1 % (ref 22–41)
LYMPHOCYTES NFR BLD: 21 % (ref 22–41)
MAGNESIUM SERPL-MCNC: 1.4 MG/DL (ref 1.5–2.5)
MAGNESIUM SERPL-MCNC: 1.4 MG/DL (ref 1.5–2.5)
MCF BLD TEG: 48.5 MM (ref 50–70)
MCH RBC QN AUTO: 29.3 PG (ref 27–33)
MCH RBC QN AUTO: 29.8 PG (ref 27–33)
MCH RBC QN AUTO: 30.3 PG (ref 27–33)
MCHC RBC AUTO-ENTMCNC: 33.2 G/DL (ref 33.6–35)
MCHC RBC AUTO-ENTMCNC: 34.1 G/DL (ref 33.6–35)
MCHC RBC AUTO-ENTMCNC: 34.1 G/DL (ref 33.6–35)
MCV RBC AUTO: 87.3 FL (ref 81.4–97.8)
MCV RBC AUTO: 88.2 FL (ref 81.4–97.8)
MCV RBC AUTO: 89 FL (ref 81.4–97.8)
MONOCYTES # BLD AUTO: 0.61 K/UL (ref 0–0.85)
MONOCYTES # BLD AUTO: 0.68 K/UL (ref 0–0.85)
MONOCYTES NFR BLD AUTO: 8.9 % (ref 0–13.4)
MONOCYTES NFR BLD AUTO: 9.5 % (ref 0–13.4)
MORPHOLOGY BLD-IMP: NORMAL
MRSA DNA SPEC QL NAA+PROBE: ABNORMAL
NEUTROPHILS # BLD AUTO: 4.44 K/UL (ref 2–7.15)
NEUTROPHILS # BLD AUTO: 5.87 K/UL (ref 2–7.15)
NEUTROPHILS NFR BLD: 68.9 % (ref 44–72)
NEUTROPHILS NFR BLD: 77.1 % (ref 44–72)
NRBC # BLD AUTO: 0.23 K/UL
NRBC # BLD AUTO: 0.31 K/UL
NRBC BLD-RTO: 3.6 /100 WBC
NRBC BLD-RTO: 4.1 /100 WBC
PA AA BLD-ACNC: ABNORMAL %
PA ADP BLD-ACNC: ABNORMAL %
PHOSPHATE SERPL-MCNC: 5 MG/DL (ref 2.5–4.5)
PHOSPHATE SERPL-MCNC: 5.7 MG/DL (ref 2.5–4.5)
PLATELET # BLD AUTO: 18 K/UL (ref 164–446)
PLATELET # BLD AUTO: 23 K/UL (ref 164–446)
PLATELET # BLD AUTO: 72 K/UL (ref 164–446)
PLATELET BLD QL SMEAR: NORMAL
PLATELETS.RETICULATED NFR BLD AUTO: 47.7 K/UL (ref 0.6–13.1)
PMV BLD AUTO: 13.1 FL (ref 9–12.9)
POTASSIUM SERPL-SCNC: 5.2 MMOL/L (ref 3.6–5.5)
POTASSIUM SERPL-SCNC: 5.9 MMOL/L (ref 3.6–5.5)
PROCALCITONIN SERPL-MCNC: 8.81 NG/ML
PRODUCT TYPE UPROD: NORMAL
PROT SERPL-MCNC: 3.4 G/DL (ref 6–8.2)
PROTHROMBIN TIME: 26.1 SEC (ref 12–14.6)
RBC # BLD AUTO: 3.63 M/UL (ref 4.2–5.4)
RBC # BLD AUTO: 4 M/UL (ref 4.2–5.4)
RBC # BLD AUTO: 4.06 M/UL (ref 4.2–5.4)
RETICS # AUTO: 0.09 M/UL (ref 0.04–0.06)
RETICS/RBC NFR: 2.4 % (ref 0.8–2.1)
RH BLD: NORMAL
SIGNIFICANT IND 70042: ABNORMAL
SITE SITE: ABNORMAL
SODIUM SERPL-SCNC: 133 MMOL/L (ref 135–145)
SODIUM SERPL-SCNC: 133 MMOL/L (ref 135–145)
SOURCE SOURCE: ABNORMAL
TEG ALGORITHM TGALG: ABNORMAL
TIBC SERPL-MCNC: 130 UG/DL (ref 250–450)
TROPONIN T SERPL-MCNC: 7 NG/L (ref 6–19)
UIBC SERPL-MCNC: <17 UG/DL (ref 110–370)
UNIT STATUS USTAT: NORMAL
VIT B12 SERPL-MCNC: 3701 PG/ML (ref 211–911)
WBC # BLD AUTO: 6.2 K/UL (ref 4.8–10.8)
WBC # BLD AUTO: 6.4 K/UL (ref 4.8–10.8)
WBC # BLD AUTO: 7.6 K/UL (ref 4.8–10.8)

## 2021-07-09 PROCEDURE — 85027 COMPLETE CBC AUTOMATED: CPT

## 2021-07-09 PROCEDURE — 36415 COLL VENOUS BLD VENIPUNCTURE: CPT

## 2021-07-09 PROCEDURE — 86900 BLOOD TYPING SEROLOGIC ABO: CPT

## 2021-07-09 PROCEDURE — 74176 CT ABD & PELVIS W/O CONTRAST: CPT

## 2021-07-09 PROCEDURE — 80048 BASIC METABOLIC PNL TOTAL CA: CPT

## 2021-07-09 PROCEDURE — 770020 HCHG ROOM/CARE - TELE (206)

## 2021-07-09 PROCEDURE — 84100 ASSAY OF PHOSPHORUS: CPT

## 2021-07-09 PROCEDURE — 85384 FIBRINOGEN ACTIVITY: CPT

## 2021-07-09 PROCEDURE — 700111 HCHG RX REV CODE 636 W/ 250 OVERRIDE (IP): Performed by: FAMILY MEDICINE

## 2021-07-09 PROCEDURE — 86901 BLOOD TYPING SEROLOGIC RH(D): CPT

## 2021-07-09 PROCEDURE — 86850 RBC ANTIBODY SCREEN: CPT

## 2021-07-09 PROCEDURE — A9270 NON-COVERED ITEM OR SERVICE: HCPCS | Performed by: FAMILY MEDICINE

## 2021-07-09 PROCEDURE — 83605 ASSAY OF LACTIC ACID: CPT

## 2021-07-09 PROCEDURE — 700111 HCHG RX REV CODE 636 W/ 250 OVERRIDE (IP): Performed by: INTERNAL MEDICINE

## 2021-07-09 PROCEDURE — 82962 GLUCOSE BLOOD TEST: CPT | Mod: 91

## 2021-07-09 PROCEDURE — 85046 RETICYTE/HGB CONCENTRATE: CPT

## 2021-07-09 PROCEDURE — 700105 HCHG RX REV CODE 258: Performed by: EMERGENCY MEDICINE

## 2021-07-09 PROCEDURE — 87641 MR-STAPH DNA AMP PROBE: CPT

## 2021-07-09 PROCEDURE — 72131 CT LUMBAR SPINE W/O DYE: CPT | Mod: ME

## 2021-07-09 PROCEDURE — 85347 COAGULATION TIME ACTIVATED: CPT

## 2021-07-09 PROCEDURE — 700102 HCHG RX REV CODE 250 W/ 637 OVERRIDE(OP): Performed by: HOSPITALIST

## 2021-07-09 PROCEDURE — 99223 1ST HOSP IP/OBS HIGH 75: CPT | Mod: AI | Performed by: FAMILY MEDICINE

## 2021-07-09 PROCEDURE — 72128 CT CHEST SPINE W/O DYE: CPT | Mod: MG

## 2021-07-09 PROCEDURE — 83735 ASSAY OF MAGNESIUM: CPT

## 2021-07-09 PROCEDURE — A9270 NON-COVERED ITEM OR SERVICE: HCPCS | Performed by: INTERNAL MEDICINE

## 2021-07-09 PROCEDURE — 99233 SBSQ HOSP IP/OBS HIGH 50: CPT | Performed by: HOSPITALIST

## 2021-07-09 PROCEDURE — 83550 IRON BINDING TEST: CPT

## 2021-07-09 PROCEDURE — 87040 BLOOD CULTURE FOR BACTERIA: CPT

## 2021-07-09 PROCEDURE — 85055 RETICULATED PLATELET ASSAY: CPT

## 2021-07-09 PROCEDURE — A9270 NON-COVERED ITEM OR SERVICE: HCPCS | Performed by: HOSPITALIST

## 2021-07-09 PROCEDURE — 84484 ASSAY OF TROPONIN QUANT: CPT

## 2021-07-09 PROCEDURE — A9270 NON-COVERED ITEM OR SERVICE: HCPCS | Performed by: PHYSICAL MEDICINE & REHABILITATION

## 2021-07-09 PROCEDURE — 80053 COMPREHEN METABOLIC PANEL: CPT

## 2021-07-09 PROCEDURE — 85025 COMPLETE CBC W/AUTO DIFF WBC: CPT

## 2021-07-09 PROCEDURE — 700111 HCHG RX REV CODE 636 W/ 250 OVERRIDE (IP)

## 2021-07-09 PROCEDURE — 700102 HCHG RX REV CODE 250 W/ 637 OVERRIDE(OP): Performed by: PHYSICAL MEDICINE & REHABILITATION

## 2021-07-09 PROCEDURE — 83540 ASSAY OF IRON: CPT

## 2021-07-09 PROCEDURE — 71045 X-RAY EXAM CHEST 1 VIEW: CPT

## 2021-07-09 PROCEDURE — 700101 HCHG RX REV CODE 250: Performed by: INTERNAL MEDICINE

## 2021-07-09 PROCEDURE — 700105 HCHG RX REV CODE 258: Performed by: INTERNAL MEDICINE

## 2021-07-09 PROCEDURE — 85610 PROTHROMBIN TIME: CPT

## 2021-07-09 PROCEDURE — 96374 THER/PROPH/DIAG INJ IV PUSH: CPT

## 2021-07-09 PROCEDURE — P9034 PLATELETS, PHERESIS: HCPCS

## 2021-07-09 PROCEDURE — 85576 BLOOD PLATELET AGGREGATION: CPT

## 2021-07-09 PROCEDURE — 84145 PROCALCITONIN (PCT): CPT

## 2021-07-09 PROCEDURE — 700101 HCHG RX REV CODE 250: Performed by: PHYSICAL MEDICINE & REHABILITATION

## 2021-07-09 PROCEDURE — 97530 THERAPEUTIC ACTIVITIES: CPT

## 2021-07-09 PROCEDURE — 700101 HCHG RX REV CODE 250: Performed by: EMERGENCY MEDICINE

## 2021-07-09 PROCEDURE — 700102 HCHG RX REV CODE 250 W/ 637 OVERRIDE(OP): Performed by: FAMILY MEDICINE

## 2021-07-09 PROCEDURE — 700102 HCHG RX REV CODE 250 W/ 637 OVERRIDE(OP): Performed by: INTERNAL MEDICINE

## 2021-07-09 PROCEDURE — 99233 SBSQ HOSP IP/OBS HIGH 50: CPT | Performed by: PHYSICAL MEDICINE & REHABILITATION

## 2021-07-09 PROCEDURE — 82728 ASSAY OF FERRITIN: CPT

## 2021-07-09 PROCEDURE — 99285 EMERGENCY DEPT VISIT HI MDM: CPT

## 2021-07-09 PROCEDURE — 93005 ELECTROCARDIOGRAM TRACING: CPT | Performed by: EMERGENCY MEDICINE

## 2021-07-09 PROCEDURE — 770024 HCHG ROOM/CARE - REHAB LOA (180)

## 2021-07-09 PROCEDURE — 36430 TRANSFUSION BLD/BLD COMPNT: CPT

## 2021-07-09 PROCEDURE — 700105 HCHG RX REV CODE 258

## 2021-07-09 PROCEDURE — 700105 HCHG RX REV CODE 258: Performed by: FAMILY MEDICINE

## 2021-07-09 PROCEDURE — 82607 VITAMIN B-12: CPT

## 2021-07-09 PROCEDURE — 97535 SELF CARE MNGMENT TRAINING: CPT

## 2021-07-09 PROCEDURE — 85730 THROMBOPLASTIN TIME PARTIAL: CPT

## 2021-07-09 RX ORDER — DEXTROSE MONOHYDRATE 25 G/50ML
50 INJECTION, SOLUTION INTRAVENOUS ONCE
Status: COMPLETED | OUTPATIENT
Start: 2021-07-09 | End: 2021-07-10

## 2021-07-09 RX ORDER — LIDOCAINE 50 MG/G
1 PATCH TOPICAL EVERY 12 HOURS
COMMUNITY

## 2021-07-09 RX ORDER — DEXTROSE AND SODIUM CHLORIDE 5; .9 G/100ML; G/100ML
INJECTION, SOLUTION INTRAVENOUS CONTINUOUS
Status: DISCONTINUED | OUTPATIENT
Start: 2021-07-09 | End: 2021-07-11

## 2021-07-09 RX ORDER — GABAPENTIN 400 MG/1
400 CAPSULE ORAL 2 TIMES DAILY
Status: DISCONTINUED | OUTPATIENT
Start: 2021-07-09 | End: 2021-07-30 | Stop reason: HOSPADM

## 2021-07-09 RX ORDER — DEXTROSE MONOHYDRATE 25 G/50ML
INJECTION, SOLUTION INTRAVENOUS
Status: COMPLETED
Start: 2021-07-09 | End: 2021-07-10

## 2021-07-09 RX ORDER — OMEPRAZOLE 20 MG/1
20 CAPSULE, DELAYED RELEASE ORAL 2 TIMES DAILY
Status: DISCONTINUED | OUTPATIENT
Start: 2021-07-09 | End: 2021-07-12 | Stop reason: HOSPADM

## 2021-07-09 RX ORDER — FUROSEMIDE 40 MG/1
40 TABLET ORAL DAILY
Status: ON HOLD | COMMUNITY
End: 2021-07-30

## 2021-07-09 RX ORDER — COLESEVELAM 180 1/1
625 TABLET ORAL 3 TIMES DAILY
Status: DISCONTINUED | OUTPATIENT
Start: 2021-07-09 | End: 2021-07-30 | Stop reason: HOSPADM

## 2021-07-09 RX ORDER — PROCHLORPERAZINE EDISYLATE 5 MG/ML
5-10 INJECTION INTRAMUSCULAR; INTRAVENOUS EVERY 4 HOURS PRN
Status: DISCONTINUED | OUTPATIENT
Start: 2021-07-09 | End: 2021-07-30 | Stop reason: HOSPADM

## 2021-07-09 RX ORDER — MAGNESIUM SULFATE HEPTAHYDRATE 40 MG/ML
2 INJECTION, SOLUTION INTRAVENOUS ONCE
Status: COMPLETED | OUTPATIENT
Start: 2021-07-09 | End: 2021-07-10

## 2021-07-09 RX ORDER — DULOXETIN HYDROCHLORIDE 60 MG/1
60 CAPSULE, DELAYED RELEASE ORAL
Status: DISCONTINUED | OUTPATIENT
Start: 2021-07-09 | End: 2021-07-30 | Stop reason: HOSPADM

## 2021-07-09 RX ORDER — PROMETHAZINE HYDROCHLORIDE 25 MG/1
12.5-25 TABLET ORAL EVERY 4 HOURS PRN
Status: DISCONTINUED | OUTPATIENT
Start: 2021-07-09 | End: 2021-07-30 | Stop reason: HOSPADM

## 2021-07-09 RX ORDER — SUMATRIPTAN 50 MG/1
50 TABLET, FILM COATED ORAL
COMMUNITY
End: 2021-12-01

## 2021-07-09 RX ORDER — LEVOTHYROXINE SODIUM 0.07 MG/1
75 TABLET ORAL
Status: DISCONTINUED | OUTPATIENT
Start: 2021-07-10 | End: 2021-07-30 | Stop reason: HOSPADM

## 2021-07-09 RX ORDER — ONDANSETRON 2 MG/ML
4 INJECTION INTRAMUSCULAR; INTRAVENOUS EVERY 4 HOURS PRN
Status: DISCONTINUED | OUTPATIENT
Start: 2021-07-09 | End: 2021-07-30 | Stop reason: HOSPADM

## 2021-07-09 RX ORDER — POTASSIUM CHLORIDE 20 MEQ/1
40 TABLET, EXTENDED RELEASE ORAL 2 TIMES DAILY
COMMUNITY
End: 2021-12-01

## 2021-07-09 RX ORDER — GABAPENTIN 400 MG/1
400 CAPSULE ORAL 3 TIMES DAILY
COMMUNITY
End: 2021-10-25 | Stop reason: SDUPTHER

## 2021-07-09 RX ORDER — LIOTHYRONINE SODIUM 25 UG/1
25 TABLET ORAL
Status: DISCONTINUED | OUTPATIENT
Start: 2021-07-09 | End: 2021-07-30 | Stop reason: HOSPADM

## 2021-07-09 RX ORDER — PROMETHAZINE HYDROCHLORIDE 25 MG/1
12.5-25 SUPPOSITORY RECTAL EVERY 4 HOURS PRN
Status: DISCONTINUED | OUTPATIENT
Start: 2021-07-09 | End: 2021-07-30 | Stop reason: HOSPADM

## 2021-07-09 RX ORDER — ACETAMINOPHEN 325 MG/1
650 TABLET ORAL EVERY 4 HOURS PRN
Status: DISCONTINUED | OUTPATIENT
Start: 2021-07-09 | End: 2021-07-27

## 2021-07-09 RX ORDER — CALCITONIN SALMON 200 [IU]/.09ML
1 SPRAY, METERED NASAL
Status: DISCONTINUED | OUTPATIENT
Start: 2021-07-09 | End: 2021-07-30 | Stop reason: HOSPADM

## 2021-07-09 RX ORDER — HYDROCORTISONE 10 MG/1
10-20 TABLET ORAL 2 TIMES DAILY
COMMUNITY
End: 2021-10-12 | Stop reason: SDUPTHER

## 2021-07-09 RX ORDER — ONDANSETRON 4 MG/1
4 TABLET, ORALLY DISINTEGRATING ORAL EVERY 4 HOURS PRN
Status: DISCONTINUED | OUTPATIENT
Start: 2021-07-09 | End: 2021-07-30 | Stop reason: HOSPADM

## 2021-07-09 RX ORDER — SODIUM CHLORIDE 9 MG/ML
INJECTION, SOLUTION INTRAVENOUS CONTINUOUS
Status: DISCONTINUED | OUTPATIENT
Start: 2021-07-09 | End: 2021-07-09

## 2021-07-09 RX ORDER — MIDODRINE HYDROCHLORIDE 5 MG/1
5 TABLET ORAL
Status: ON HOLD | COMMUNITY
End: 2021-07-30

## 2021-07-09 RX ORDER — SUMATRIPTAN 50 MG/1
50 TABLET, FILM COATED ORAL
Status: DISCONTINUED | OUTPATIENT
Start: 2021-07-09 | End: 2021-07-30 | Stop reason: HOSPADM

## 2021-07-09 RX ORDER — MIDODRINE HYDROCHLORIDE 5 MG/1
5 TABLET ORAL
Status: DISCONTINUED | OUTPATIENT
Start: 2021-07-09 | End: 2021-07-11

## 2021-07-09 RX ORDER — AMITRIPTYLINE HYDROCHLORIDE 10 MG/1
10 TABLET, FILM COATED ORAL DAILY
COMMUNITY
End: 2021-10-04 | Stop reason: SDUPTHER

## 2021-07-09 RX ORDER — AMITRIPTYLINE HYDROCHLORIDE 10 MG/1
10 TABLET, FILM COATED ORAL DAILY
Status: DISCONTINUED | OUTPATIENT
Start: 2021-07-10 | End: 2021-07-30 | Stop reason: HOSPADM

## 2021-07-09 RX ORDER — VITAMIN B COMPLEX
2000 TABLET ORAL DAILY
Status: DISCONTINUED | OUTPATIENT
Start: 2021-07-10 | End: 2021-07-30 | Stop reason: HOSPADM

## 2021-07-09 RX ORDER — DEXTROSE MONOHYDRATE, SODIUM CHLORIDE, AND POTASSIUM CHLORIDE 50; 1.49; 9 G/1000ML; G/1000ML; G/1000ML
INJECTION, SOLUTION INTRAVENOUS CONTINUOUS
Status: DISCONTINUED | OUTPATIENT
Start: 2021-07-09 | End: 2021-07-09

## 2021-07-09 RX ORDER — FLUDROCORTISONE ACETATE 0.1 MG/1
0.1 TABLET ORAL 2 TIMES DAILY
COMMUNITY

## 2021-07-09 RX ORDER — SENNOSIDES 8.6 MG
650 CAPSULE ORAL EVERY 4 HOURS PRN
Status: ON HOLD | COMMUNITY
End: 2021-07-30

## 2021-07-09 RX ADMIN — HYDROCORTISONE 20 MG: 10 TABLET ORAL at 09:30

## 2021-07-09 RX ADMIN — DEXTROSE MONOHYDRATE 50 ML: 25 INJECTION, SOLUTION INTRAVENOUS at 20:55

## 2021-07-09 RX ADMIN — SUMATRIPTAN SUCCINATE 50 MG: 50 TABLET ORAL at 22:10

## 2021-07-09 RX ADMIN — DEXTROSE AND SODIUM CHLORIDE: 5; 900 INJECTION, SOLUTION INTRAVENOUS at 21:01

## 2021-07-09 RX ADMIN — CALCITONIN SALMON 200 UNITS: 200 SPRAY, METERED NASAL at 23:08

## 2021-07-09 RX ADMIN — MAGNESIUM SULFATE 2 G: 2 INJECTION INTRAVENOUS at 22:56

## 2021-07-09 RX ADMIN — SUMATRIPTAN SUCCINATE 50 MG: 25 TABLET ORAL at 02:16

## 2021-07-09 RX ADMIN — ACETAMINOPHEN 650 MG: 325 TABLET, FILM COATED ORAL at 09:44

## 2021-07-09 RX ADMIN — GABAPENTIN 400 MG: 400 CAPSULE ORAL at 09:28

## 2021-07-09 RX ADMIN — MIDODRINE HYDROCHLORIDE 5 MG: 5 TABLET ORAL at 19:52

## 2021-07-09 RX ADMIN — LIOTHYRONINE SODIUM 25 MCG: 25 TABLET ORAL at 23:08

## 2021-07-09 RX ADMIN — LEVOTHYROXINE SODIUM 75 MCG: 75 TABLET ORAL at 05:35

## 2021-07-09 RX ADMIN — APIXABAN 5 MG: 5 TABLET, FILM COATED ORAL at 09:28

## 2021-07-09 RX ADMIN — OMEPRAZOLE 20 MG: 20 CAPSULE, DELAYED RELEASE ORAL at 09:33

## 2021-07-09 RX ADMIN — TRANEXAMIC ACID 1000 MG: 100 INJECTION, SOLUTION INTRAVENOUS at 13:19

## 2021-07-09 RX ADMIN — MIDODRINE HYDROCHLORIDE 5 MG: 2.5 TABLET ORAL at 09:27

## 2021-07-09 RX ADMIN — AMITRIPTYLINE HYDROCHLORIDE 10 MG: 10 TABLET, FILM COATED ORAL at 09:31

## 2021-07-09 RX ADMIN — FLUDROCORTISONE ACETATE 0.1 MG: 0.1 TABLET ORAL at 05:35

## 2021-07-09 RX ADMIN — COLESEVELAM HYDROCHLORIDE 625 MG: 625 TABLET, FILM COATED ORAL at 09:30

## 2021-07-09 RX ADMIN — Medication 64 MG: at 09:31

## 2021-07-09 RX ADMIN — MEROPENEM 0.5 G: 500 INJECTION, POWDER, FOR SOLUTION INTRAVENOUS at 19:53

## 2021-07-09 RX ADMIN — LIDOCAINE 1 PATCH: 50 PATCH TOPICAL at 09:29

## 2021-07-09 RX ADMIN — CHOLECALCIFEROL TAB 25 MCG (1000 UNIT) 2000 UNITS: 25 TAB at 09:28

## 2021-07-09 RX ADMIN — VANCOMYCIN HYDROCHLORIDE 2500 MG: 500 INJECTION, POWDER, LYOPHILIZED, FOR SOLUTION INTRAVENOUS at 20:06

## 2021-07-09 RX ADMIN — Medication: at 09:32

## 2021-07-09 RX ADMIN — FUROSEMIDE 40 MG: 40 TABLET ORAL at 05:35

## 2021-07-09 RX ADMIN — NYSTATIN 500000 UNITS: 100000 SUSPENSION ORAL at 23:07

## 2021-07-09 RX ADMIN — ACETAMINOPHEN 650 MG: 325 TABLET, FILM COATED ORAL at 21:24

## 2021-07-09 RX ADMIN — DULOXETINE HYDROCHLORIDE 60 MG: 60 CAPSULE, DELAYED RELEASE ORAL at 23:07

## 2021-07-09 RX ADMIN — POTASSIUM CHLORIDE 40 MEQ: 1500 TABLET, EXTENDED RELEASE ORAL at 09:28

## 2021-07-09 RX ADMIN — COLESEVELAM HYDROCHLORIDE 625 MG: 625 TABLET, COATED ORAL at 23:08

## 2021-07-09 RX ADMIN — HYDROCORTISONE SODIUM SUCCINATE 100 MG: 100 INJECTION, POWDER, FOR SOLUTION INTRAMUSCULAR; INTRAVENOUS at 19:52

## 2021-07-09 ASSESSMENT — FIBROSIS 4 INDEX: FIB4 SCORE: 29.04

## 2021-07-09 ASSESSMENT — COGNITIVE AND FUNCTIONAL STATUS - GENERAL
STANDING UP FROM CHAIR USING ARMS: A LITTLE
MOBILITY SCORE: 21
DAILY ACTIVITIY SCORE: 20
SUGGESTED CMS G CODE MODIFIER DAILY ACTIVITY: CJ
HELP NEEDED FOR BATHING: A LITTLE
WALKING IN HOSPITAL ROOM: A LITTLE
SUGGESTED CMS G CODE MODIFIER MOBILITY: CJ
TOILETING: A LITTLE
CLIMB 3 TO 5 STEPS WITH RAILING: A LITTLE
DRESSING REGULAR LOWER BODY CLOTHING: A LOT

## 2021-07-09 ASSESSMENT — ENCOUNTER SYMPTOMS
ABDOMINAL PAIN: 1
BLOOD IN STOOL: 1
MUSCULOSKELETAL NEGATIVE: 1
FEVER: 0
NECK PAIN: 0
VOMITING: 0
COUGH: 0
SHORTNESS OF BREATH: 0
VOMITING: 0
DIARRHEA: 1
BRUISES/BLEEDS EASILY: 0
BLURRED VISION: 0
FALLS: 1
FEVER: 0
PALPITATIONS: 0
ABDOMINAL PAIN: 1
COUGH: 0
CHILLS: 0
DOUBLE VISION: 0
NAUSEA: 0
BLOOD IN STOOL: 1
HEMOPTYSIS: 0
NAUSEA: 0
DEPRESSION: 0
CHILLS: 0
POLYDIPSIA: 0
PALPITATIONS: 0
MYALGIAS: 1
WEIGHT LOSS: 1
EYES NEGATIVE: 1
DIZZINESS: 1
HEARTBURN: 0

## 2021-07-09 ASSESSMENT — LIFESTYLE VARIABLES
TOTAL SCORE: 0
EVER HAD A DRINK FIRST THING IN THE MORNING TO STEADY YOUR NERVES TO GET RID OF A HANGOVER: NO
EVER HAD A DRINK FIRST THING IN THE MORNING TO STEADY YOUR NERVES TO GET RID OF A HANGOVER: NO
DOES PATIENT WANT TO STOP DRINKING: NO
HOW MANY TIMES IN THE PAST YEAR HAVE YOU HAD 5 OR MORE DRINKS IN A DAY: 0
ON A TYPICAL DAY WHEN YOU DRINK ALCOHOL HOW MANY DRINKS DO YOU HAVE: 0
CONSUMPTION TOTAL: INCOMPLETE
ALCOHOL_USE: NO
CONSUMPTION TOTAL: NEGATIVE
HAVE YOU EVER FELT YOU SHOULD CUT DOWN ON YOUR DRINKING: NO
HOW MANY TIMES IN THE PAST YEAR HAVE YOU HAD 5 OR MORE DRINKS IN A DAY: 0
DOES PATIENT WANT TO STOP DRINKING: NO
EVER FELT BAD OR GUILTY ABOUT YOUR DRINKING: NO
ON A TYPICAL DAY WHEN YOU DRINK ALCOHOL HOW MANY DRINKS DO YOU HAVE: 0
HAVE PEOPLE ANNOYED YOU BY CRITICIZING YOUR DRINKING: NO
HAVE PEOPLE ANNOYED YOU BY CRITICIZING YOUR DRINKING: NO
TOTAL SCORE: 0
AVERAGE NUMBER OF DAYS PER WEEK YOU HAVE A DRINK CONTAINING ALCOHOL: 0
TOTAL SCORE: 0
AVERAGE NUMBER OF DAYS PER WEEK YOU HAVE A DRINK CONTAINING ALCOHOL: 0
DO YOU DRINK ALCOHOL: NO
ALCOHOL_USE: NO
HAVE YOU EVER FELT YOU SHOULD CUT DOWN ON YOUR DRINKING: NO

## 2021-07-09 ASSESSMENT — PAIN DESCRIPTION - PAIN TYPE
TYPE: ACUTE PAIN
TYPE: ACUTE PAIN

## 2021-07-09 NOTE — PROGRESS NOTES
Hospital Medicine Daily Progress Note      Chief Complaint:  Syncope  Orthostatic hypotension   Adrenal Insufficiency  Transaminitis    Interval History:  Pt c/o abdominal pain that started overnight.  Has also been having nosebleeding and passing blood per rectum.    Review of Systems  Review of Systems   Constitutional: Negative for chills and fever.   HENT: Positive for nosebleeds.    Eyes: Negative.    Respiratory: Negative for cough and shortness of breath.    Cardiovascular: Positive for leg swelling. Negative for chest pain and palpitations.   Gastrointestinal: Positive for abdominal pain and blood in stool. Negative for nausea and vomiting.   Musculoskeletal: Negative.    Skin: Negative for itching and rash.   Endo/Heme/Allergies: Negative for polydipsia. Does not bruise/bleed easily.        Physical Exam  Temp:  [36.4 °C (97.6 °F)-36.9 °C (98.5 °F)] 36.9 °C (98.5 °F)  Pulse:  [] 103  Resp:  [16-19] 19  BP: (106-147)/(75-85) 112/82  SpO2:  [95 %-97 %] 97 %    Physical Exam  Vitals reviewed.   Constitutional:       General: She is not in acute distress.     Appearance: She is not ill-appearing.      Comments: Cushingoid   HENT:      Head: Normocephalic and atraumatic.      Right Ear: External ear normal.      Left Ear: External ear normal.      Nose: Nose normal.      Mouth/Throat:      Pharynx: Oropharynx is clear.   Eyes:      General:         Right eye: No discharge.         Left eye: No discharge.      Extraocular Movements: Extraocular movements intact.      Conjunctiva/sclera: Conjunctivae normal.   Neck:      Vascular: No JVD.   Cardiovascular:      Rate and Rhythm: Normal rate and regular rhythm.   Pulmonary:      Effort: Pulmonary effort is normal. No respiratory distress.      Breath sounds: Normal breath sounds. No stridor. No wheezing.   Abdominal:      General: Bowel sounds are normal. There is no distension.      Palpations: Abdomen is soft.      Tenderness: There is abdominal tenderness.  There is no guarding or rebound.   Musculoskeletal:      Cervical back: Normal range of motion and neck supple.      Right lower leg: Edema present.      Left lower leg: Edema present.      Comments: Left lateral hip/thigh fullness   Skin:     General: Skin is warm and dry.   Neurological:      Mental Status: She is alert and oriented to person, place, and time.         Fluids    Intake/Output Summary (Last 24 hours) at 7/9/2021 1001  Last data filed at 7/9/2021 0830  Gross per 24 hour   Intake 460 ml   Output --   Net 460 ml       Laboratory  Recent Labs     07/07/21  0845 07/08/21  0550 07/09/21  0600   WBC 6.5 4.7* 6.2   RBC 4.24 3.61* 4.06*   HEMOGLOBIN 12.7 11.0* 11.9*   HEMATOCRIT 38.1 32.3* 35.8*   MCV 89.9 89.5 88.2   MCH 30.0 30.5 29.3   MCHC 33.3* 34.1 33.2*   RDW 61.1* 60.9* 60.7*   PLATELETCT 30* 24* 18*     Recent Labs     07/07/21  0538 07/08/21  0550   SODIUM 140 139   POTASSIUM 3.9 4.3   CHLORIDE 101 98   CO2 30 29   GLUCOSE 93 99   BUN 8 10   CREATININE 0.39* 0.53   CALCIUM 7.6* 7.3*               Assessment/Plan  Abdominal pain  Assessment & Plan  Pt w/ new onset abd pain, BRBPR, and escalating bilirubin  Given thrombocytopenia, concern for intra-abd bleeding vs biliary obstruction vs other  Recommend transfer to acute for further work up and management    Hip swelling, left  Assessment & Plan  Pt c/o left hip/thigh pain/swelling  U/S +soft tissue edema, negative for fluid collection    Hypomagnesemia  Assessment & Plan  Continue MgCl2    Thrombocytopenia (HCC)  Assessment & Plan  Suspect 2/2 Eliquis vs Xerava (both relatively new to the pt)  Other culprit meds include Amitriptyline, Lasix, Singulair (all chronic meds)  Consider holding one or more of these meds given epistaxis and BRBPR  Consider Platelet transfusion  Continue to follow Platelet counts    Hemochromatosis  Assessment & Plan  Has waxing/waning transaminitis  Hepatitis Panel negative  U/S RUQ hepatic steatosis or hepatocellular  disease    History of pulmonary embolism- (present on admission)  Assessment & Plan  Suspected 2/2 COVID-19 vaccine per [facility]  Anticoagulated on Eliquis    MRSA (methicillin resistant Staphylococcus aureus) sinus infection- (present on admission)  Assessment & Plan  On long term Xerava per ID  Repeat Sinus Culture in progress    Adrenal insufficiency (Glasford's disease) (HCC)- (present on admission)  Assessment & Plan  On Florinef, Hydrocortisone, and Somatropin  Outpt Endocrine F/U w/ Dr. Jeff [phone]    Orthostatic hypotension- (present on admission)  Assessment & Plan  May be etiology of her syncope  Has chronic history, suspect 2/2 AI  Florinef increased from qd to bid dosing  Continue Hydrocortisone  Now also on Midodrine per Physiatry  Off Losartan and Coreg    Hypothyroidism- (present on admission)  Assessment & Plan  TSH 0.02 (low) and FT4 1.27 (normal)  On Synthroid and Cytomel  Outpt Endocrine F/U    Depression- (present on admission)  Assessment & Plan  On Amitriptyline and Cymbalta    Chronic systolic heart failure (HCC)- (present on admission)  Assessment & Plan  EF range has been as low as 20% and as high as 75%  On Lasix  Outpt Cardiology F/U    Gastroesophageal reflux disease without esophagitis- (present on admission)  Assessment & Plan  Increase PPI given BRBPR    Full Code    Discussed w/ attending physiatrist, Dr. Metzger.

## 2021-07-09 NOTE — ASSESSMENT & PLAN NOTE
Pt w/ new onset abd pain, BRBPR, and escalating bilirubin  Given thrombocytopenia, concern for intra-abd bleeding vs biliary obstruction vs other  Recommend transfer to acute for further work up and management

## 2021-07-09 NOTE — THERAPY
Physical Therapy   Daily Treatment     Patient Name: Donna Isaac  Age:  56 y.o., Sex:  female  Medical Record #: [id number]  Today's Date: 7/9/2021     Precautions  Precautions: Fall Risk  Comments: Hx of syncopal episodes, Orthostatic Hypotension, abdominal binder OOB, R ankle brace, [place] (deaf R ear)    Subjective    Pt reports she is feeling better than  She has lately     Objective       07/08/21 1031   Gait Functional Level of Assist    Gait Level Of Assist Contact Guard Assist   Assistive Device Front Wheel Walker   Distance (Feet) 30   # of Times Distance was Traveled 1   Deviation Shuffled Gait  (wc follow for safety and walking around mat)   Transfer Functional Level of Assist   Bed, Chair, Wheelchair Transfer Contact Guard Assist   Bed Chair Wheelchair Transfer Description Adaptive equipment;Verbal cueing   Standing Lower Body Exercises   Hamstring Curl 2 sets of 15;Bilateral    Hip Extension 2 sets of 15;Bilateral    Hip Abduction 2 sets of 15;Bilateral   Marching 2 sets of 15   Heel Rise 2 sets of 15;Bilateral   Toe Rise 2 sets of 15;Bilateral   Mini Squat 2 sets of 15;Partial   Comments Limiyed by L hip pain., VCs and edemo mfor form adn technique   Bed Mobility    Supine to Sit Supervised   Sit to Supine Supervised   Sit to Stand Contact Guard Assist  (repeated practice thoughout session, VCs for monitoring symp)   Scooting Supervised   Rolling Supervised   Interdisciplinary Plan of Care Collaboration   Patient Position at End of Therapy In Bed;Tray Table within Reach;Call Light within Reach   PT Total Time Spent   PT Individual Total Time Spent (Mins) 60   PT Charge Group   PT Gait Training 1   PT Therapeutic Exercise 2   PT Therapeutic Activities 1     Pt demos BP WNL and agreeable to trying standing activities this session    Assessment    Pt able to participate in more mobility this session - required CGA throughout and close wc follow for fear of sudden syncopal event. Limited by activity  tolerance but able to perform exercises and gait this session for short amounts of time followed by seated rest breaks.    Strengths: Able to follow instructions, Motivated for self care and independence, Independent prior level of function, Good insight into deficits/needs, Willingly participates in therapeutic activities, Pleasant and cooperative  Barriers: Decreased endurance, Generalized weakness, Orthostatic hypotension, Impaired balance, Limited mobility    Plan    Increase mobility focus at w/c level, Ther ex for strengthening and endurance, Standing tolerance as safety allows, Transfers, Education.  D/C scheduled for 7/12/21.     Passport items to be completed:  Passport items to be completed:  Get in/out of bed safely, in/out of a vehicle, safely use mobility device, walk or wheel around home/community, navigate up and down stairs, show how to get up/down from the ground, ensure home is accessible, demonstrate HEP, complete caregiver training       Physical Therapy Problems (Active)     Problem: Mobility     Dates: Start: 07/01/21       Goal: STG-Within one week, patient will ambulate household distance of 150 feet with gait belt and SBA.     Dates: Start: 07/01/21       Goal Note filed on 07/08/21 1049 by Luis Morales, PT     Limited ability to work on ambulation due to frequency of syncopal episodes and/or hypotension            Goal: STG-Within one week, patient will ambulate up/down flight of stairs with B railings, gait belt, and CGA.     Dates: Start: 07/01/21       Goal Note filed on 07/08/21 1049 by Luis Morales, PT     Limited ability to work on ambulation due to frequency of syncopal episodes and/or hypotension               Problem: Mobility Transfers     Dates: Start: 06/29/21       Goal: STG-Within one week, patient will transfer bed to chair [facility] with LRD In order to maximize self mobility     Dates: Start: 06/29/21       Goal Note filed on 07/08/21 1049 by Luis ALMONTE  Andrew, PT     CGA-SBA due to frequency of syncopal episodes and/or hypotension               Problem: PT-Long Term Goals     Dates: Start: 06/29/21       Goal: LTG-By discharge, patient will ambulate 150 ft With LRD at mod I in order to progress towards PLOF      Dates: Start: 06/29/21          Goal: LTG-By discharge, patient will transfer one surface to another At mod I with LRD In order to maximize self mobility     Dates: Start: 06/29/21          Goal: LTG-By discharge, patient will ambulate up/down flight of stairs With single HR at SPV in order to enter/exit home safely     Dates: Start: 06/29/21

## 2021-07-09 NOTE — ASSESSMENT & PLAN NOTE
History of.  Normally on Cortef oral.  We will start stress dose Solu-Cortef considering patient has been having frequent syncopal episodes lately for now and then gradually wean down back to home dose oral Cortef.

## 2021-07-09 NOTE — ED NOTES
Assist RN note- lab called with a critical platelet count of 23. Lab results read back to lab. Primary RN notified

## 2021-07-09 NOTE — THERAPY
Missed Therapy     Patient Name: Donna Isaac  Age:  56 y.o., Sex:  female  Medical Record #: [id number]  Today's Date: 7/9/2021    Discussed missed therapy with primary PT and Dr Metzger. Pt with fatigue and increased LE edema, reports no sleep last night due to Lasix and bleeding. Medical hold in place per Dr Metzger.        07/09/21 0901   Precautions   Precautions Fall Risk   Comments Hx of syncopal episodes, Orthostatic Hypotension, abdominal binder OOB, R ankle brace, [place] (deaf R ear)   Interdisciplinary Plan of Care Collaboration   IDT Collaboration with  Occupational Therapist;Physical Therapist;Physician   Collaboration Comments re: medical hold and pt's CLOF   Therapy Missed   Missed Therapy (Minutes) 30   Reason For Missed Therapy Medical - Patient on Hold from Therapy

## 2021-07-09 NOTE — ASSESSMENT & PLAN NOTE
Complain of both urinary and fecal incontinence which is new in the setting of lower back pain and numbness across the lower back area that started 3 months ago after a fall.  We will check thoracic and lumbar spine CT.  Fall precautions.

## 2021-07-09 NOTE — CARE PLAN
The patient is Stable - Low risk of patient condition declining or worsening    Problem: Pain - Standard  Goal: Alleviation of pain or a reduction in pain to the patient’s comfort goal  Outcome: Progressing  Flowsheets  Taken 7/9/2021 0253 by Sergio Shah R.N.  OB Pain Intervention:   Medication - See MAR   Distraction   Rest  Taken 7/9/2021 0249 by Sergio Shah R.N.  Pain Rating Scale (NPRS): 7  Taken 7/8/2021 1431 by Dennis Alcantar MS,OTR/L  Non Verbal Scale: Calm     Problem: Fall Risk - Rehab  Goal: Patient will remain free from falls  Outcome: Progressing  Note:   Patient uses call light consistently and appropriately this shift.  Waits for assistance when needed and does not attempt self transfer.  Able to verbalize needs.  Will continue to monitor.

## 2021-07-09 NOTE — PROGRESS NOTES
Notified of critical lab result at 0710. Platelets now 18. Standing order to transfuse 10-pack platelets if results below 10. Dr. Metzger and Dr. Grover updated.

## 2021-07-09 NOTE — ASSESSMENT & PLAN NOTE
Chronic for few years now was attributed to adrenal insufficiency.  Has been more frequent lately.  Start on stress dose Solu-Cortef as mentioned above.  Continue home dose midodrine.  Gentle IV fluid resuscitation.  Fall precautions.  - Likely Orthostatic Hypotension here in Hospital  - 7/23 able to stand up asymptomatic but her BP was low and borderline low times 2 with PT and OT  - Continue mobility daily out of bed times 2

## 2021-07-09 NOTE — PROGRESS NOTES
Notified of patient bloody stools this morning. Patient with low platelets of 18. Updated Dr. Metzger of patient condition. Received orders to send patient to ED for further evaluation. Patient stated that her friend informed her  (Colin) of her transfer to [facility] and he will be meeting her over there. Patient left the facility via [facility] transport at 1047.

## 2021-07-09 NOTE — PROGRESS NOTES
4 Eyes Skin Assessment Completed by AILYN Cortes and AILYN Goldman.    Head Jaundice  Ears Jaundice  Nose Jaundice  Mouth Redness and Ulcer(s), thrush  Neck WDL  Breast/Chest Jaundice  Shoulder Blades WDL  Spine WDL  (R) Arm/Elbow/Hand Bruising, Swelling and Shiny; fingers purple with cuts  (L) Arm/Elbow/Hand Bruising, Swelling and Shiny; fingers purple with cuts  Abdomen Bruising  Groin Redness, Blanching, Excoriation, Rash and Swelling  Scrotum/Coccyx/Buttocks Redness, excoriated, blanching  (R) Leg Jaundice and Edema  (L) Leg Jaundice and Edema  (R) Heel/Foot/Toe Redness, Blanching and Scab on ankle, toes purple  (L) Heel/Foot/Toe Redness, Blanching and Scab, dry, toes purple          Devices In Places Tele Box and Pulse Ox, PICC      Interventions In Place Pillows, Heels Loaded W/Pillows and Pressure Redistribution Mattress    Possible Skin Injury Yes    Pictures Uploaded Into Epic Yes  Wound Consult Placed Yes  RN Wound Prevention Protocol Ordered Yes

## 2021-07-09 NOTE — THERAPY
Missed Therapy     Patient Name: Donna Isaac  Age:  56 y.o., Sex:  female  Medical Record #: [id number]  Today's Date: 7/9/2021    Discussed missed therapy with Dr Metzger    Patient not medically stable, [facility]

## 2021-07-09 NOTE — DISCHARGE PLANNING
DME referral sen tto [facility].  HH referral sent to [facility] per choice form.  Awaiting responses.

## 2021-07-09 NOTE — ASSESSMENT & PLAN NOTE
Likely diverticular bleed in the light of thrombocytopenia and being on Eliquis per  Bradycardia has resolved with last bowel movement this a.m. was nonbloody.  H&H stable.  Continue to monitor H&H.  Transfuse if hemoglobin drops below 7 g/dL.
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
## 2021-07-09 NOTE — ASSESSMENT & PLAN NOTE
History of chronic MRSA sinusitis.  On Xerava per ID recommendations.  We will continue.  MRSA PCR pending.

## 2021-07-09 NOTE — ASSESSMENT & PLAN NOTE
- Initially concerns for DIC  - Viral and immunologic ruled out  - Most likely Drug induced (Hx of chronic Abx use for sinusitis)  - Improved after Transfusion  - Now back to normal limits  - Today 7/24 Platelet  count is 252  - CTM  - In the past several days on Lovenox DVT prophylaxis  - We will give weight base dose (Hx of PE)

## 2021-07-09 NOTE — ASSESSMENT & PLAN NOTE
Patient has been having pain and burning in her buccal mucosa and throat and her p.o. intake has been almost 0 over the last few days.  Thrush was noted on exam.  We will treat.  Encourage supplements between meals.  Encourage p.o. intake  Dietitian consulted.

## 2021-07-09 NOTE — THERAPY
Physical Therapy   Daily Treatment     Patient Name: Donna Isaac  Age:  56 y.o., Sex:  female  Medical Record #: [id number]  Today's Date: 7/9/2021     Precautions  Precautions: Fall Risk  Comments: Hx of syncopal episodes, Orthostatic Hypotension, abdominal binder OOB, R ankle brace, [place] (deaf R ear)    Subjective    Patient willing to participate but unable physically; LUE line donned; BLE elevated at to above heart with pt in supine.  Pt reports would like to d/c home when possible and continue management from there.     Objective       07/09/21 1001   Vitals   O2 (LPM) 0   O2 Delivery Device None - Room Air   Interdisciplinary Plan of Care Collaboration   IDT Collaboration with  Physician;Physical Therapist;Occupational Therapist;Certified Nursing Assistant;  (and therapy scheduling)   Patient Position at End of Therapy In Bed;Call Light within Reach;Tray Table within Reach;Phone within Reach   Collaboration Comments Pt reports limitations from not sleeping well, migraine last night, nose bleed last night; pt's BLE already elevated at heart height; pt reports having Lasix this morning; A patient with donning of B SILVERIO hose; Discussed pt's ability and limitations with therapy today; MD present and discussed medical status; notified PT, OT, therapy scheduling, and CM of pending transfer to [facility] for GI workup; MD placed Medical hold for therapies.  x15 min with patient.   Therapy Missed   Missed Therapy (Minutes) 45   Reason For Missed Therapy Medical - Patient on Hold from Therapy;Medical - Patient not Able To Participate   PT Total Time Spent   PT Individual Total Time Spent (Mins) 15   PT Charge Group   PT Therapeutic Activities 1       Assessment    Patient unable to participate physically and Therapy holds placed; good cognition and understanding of discussions.    Strengths: Able to follow instructions, Motivated for self care and independence, Independent prior level of function, Good  insight into deficits/needs, Willingly participates in therapeutic activities, Pleasant and cooperative  Barriers: Decreased endurance, Generalized weakness, Orthostatic hypotension, Impaired balance, Limited mobility    Plan    D/c Acute today for medical.    Passport items to be completed:  Passport items to be completed:  Get in/out of bed safely, in/out of a vehicle, safely use mobility device, walk or wheel around home/community, navigate up and down stairs, show how to get up/down from the ground, ensure home is accessible, demonstrate HEP, complete caregiver training    Physical Therapy Problems (Active)     Problem: Mobility     Dates: Start: 07/01/21       Goal: STG-Within one week, patient will ambulate household distance of 150 feet with gait belt and SBA.     Dates: Start: 07/01/21       Goal Note filed on 07/08/21 1049 by Luis Morales, PT     Limited ability to work on ambulation due to frequency of syncopal episodes and/or hypotension            Goal: STG-Within one week, patient will ambulate up/down flight of stairs with B railings, gait belt, and CGA.     Dates: Start: 07/01/21       Goal Note filed on 07/08/21 1049 by Luis Morales PT     Limited ability to work on ambulation due to frequency of syncopal episodes and/or hypotension               Problem: Mobility Transfers     Dates: Start: 06/29/21       Goal: STG-Within one week, patient will transfer bed to chair [facility] with LRD In order to maximize self mobility     Dates: Start: 06/29/21       Goal Note filed on 07/08/21 1049 by Luis Morales PT     CGA-SBA due to frequency of syncopal episodes and/or hypotension               Problem: PT-Long Term Goals     Dates: Start: 06/29/21       Goal: LTG-By discharge, patient will ambulate 150 ft With LRD at mod I in order to progress towards PLOF      Dates: Start: 06/29/21          Goal: LTG-By discharge, patient will transfer one surface to another At mod I with LRD In  order to maximize self mobility     Dates: Start: 06/29/21          Goal: LTG-By discharge, patient will ambulate up/down flight of stairs With single HR at SPV in order to enter/exit home safely     Dates: Start: 06/29/21

## 2021-07-09 NOTE — ASSESSMENT & PLAN NOTE
History of PE  Follow-up with gastroenterology as an outpatient.    Now with worsening LFTs and increasing bilirubin likely due to starvation.  Continue to monitor for now.  Avoid hepatotoxic medications.

## 2021-07-09 NOTE — THERAPY
"Occupational Therapy  Daily Treatment     Patient Name: Donna Isaac  Age:  56 y.o., Sex:  female  Medical Record #: [id number]  Today's Date: 7/9/2021     Precautions  Precautions: (P) Fall Risk  Comments: (P) Hx of syncopal episodes, Orthostatic Hypotension, abdominal binder OOB, R ankle brace, [place] (deaf R ear)    Safety   ADL Safety : Requires Physical Assist for Safety, Requires Supervision for Safety  Bathroom Safety: Requires Supervision for Safety, Requires Physical Assist for Safety  Comments: see below functional levels for ADL performance details.     Subjective    \"I probably got about an hour of sleep last night because of this migraine and my leg was hurting and felt restless.\"      Objective       07/09/21 0931   Precautions   Precautions Fall Risk   Comments Hx of syncopal episodes, Orthostatic Hypotension, abdominal binder OOB, R ankle brace, [place] (deaf R ear)   Pain   Intervention Heat Applied;Repositioned   Pain 0 - 10 Group   Location Neck;Leg   Location Orientation Posterior;Right;Left   Pain Rating Scale (NPRS) 8   Description Aching   Comfort Goal Comfort with Movement;Perform Activity;Sleep Comfortably   Therapist Pain Assessment Post Activity Pain Same as Prior to Activity;Nurse Notified  (nurse present and gave pt pain pills)   Non Verbal Descriptors   Non Verbal Scale  Grimacing;Restlessness   Cognition    Level of Consciousness Alert   Functional Level of Assist   Grooming Supervision   Grooming Description Increased time;Initial preparation for task;Set-up of equipment  (brush teeth/wash face sitting up in bed)   Interdisciplinary Plan of Care Collaboration   Patient Position at End of Therapy In Bed;Call Light within Reach;Tray Table within Reach;Phone within Reach   OT Total Time Spent   OT Individual Total Time Spent (Mins) 30   OT Charge Group   OT Self Care / ADL 1   OT Therapy Activity 1       Assessment    Pt tolerated session poorly. Pt unable to get OOB at this session 2/2 " pain/fatigue. Pt reported pain in neck, stomach, BLE-edema. Assisted in repositioning pt's BLE in elevated position with bed and pillows placed underneath BLE with heels floating to reduce edema. Educated pt on performing her heel pumps while in bed to assist in reducing edema. Heat pack placed behind pt's neck to reduce pain/aching. Increased time taken to discuss pt's d/c plan- pt plans to move into her new place by August 1st- all one level. Prior to d/c- pt will go back to friends home, they are clearing out first level dining room to making it into her bedroom so that way pt will not have to complete any steps. Pt reported there is a full bath on first floor and they already have a TTB and hand held shower head for it. Discussed putting pt on a bladder program every 2 hours or so due to incontinence- educated pt on pelvic floor exercises.     Strengths: Pleasant and cooperative, Willingly participates in therapeutic activities  Barriers: Decreased endurance, Fatigue, Generalized weakness, Hearing impairment, Orthostatic hypotension, Impaired activity tolerance, Impaired balance, Limited mobility    Plan    Cooking and/or laundry task with combination of standing and sitting, ADLs, IADLs, functional mobility, AE education, strength/endurance building, balance.      Passport items to be completed:  Perform bathroom transfers, complete dressing, complete feeding, get ready for the day, prepare a simple meal, participate in household tasks, adapt home for safety needs, demonstrate home exercise program, complete caregiver training     Occupational Therapy Goals (Active)     Problem: Dressing     Dates: Start: 07/08/21       Goal: STG-Within one week, patient will dress LB     Dates: Start: 07/08/21       Goal Note filed on 07/08/21 1206 by Dennis Alcantar MS,OTR/L     1) Individualized Goal:  Sup/SBA for LB clothing management via AE/DME PRN  2) Interventions:  OT Self Care/ADL, OT Neuro Re-Ed/Balance, OT Therapeutic  Activity, OT Evaluation, and OT Therapeutic Exercise               Problem: Functional Transfers     Dates: Start: 07/08/21       Goal: STG-Within one week, patient will transfer to toilet     Dates: Start: 07/08/21       Goal Note filed on 07/08/21 1206 by Dennis Alcantar MS,OTR/L     1) Individualized Goal:  Sup/SBA for commode transfer via DME   2) Interventions: OT Self Care/ADL, OT Neuro Re-Ed/Balance, OT Therapeutic Activity, OT Evaluation, and OT Therapeutic Exercise               Problem: OT Long Term Goals     Dates: Start: 06/29/21       Goal: LTG-By discharge, patient will complete basic self care tasks     Dates: Start: 06/29/21       Goal Note filed on 06/29/21 0823 by Dennis Alcantar MS,OTR/L     1) Individualized Goal:  Mod I for BADL's via AE/DME PRN  2) Interventions:  OT Self Care/ADL, OT Neuro Re-Ed/Balance, OT Therapeutic Activity, OT Evaluation, and OT Therapeutic Exercise            Goal: LTG-By discharge, patient will perform bathroom transfers     Dates: Start: 06/29/21       Goal Note filed on 06/29/21 0823 by Dennis Alcantar MS,OTR/L     1) Individualized Goal:  Mod I for bathroom transfers via DME PRN  2) Interventions: OT Self Care/ADL, OT Neuro Re-Ed/Balance, OT Therapeutic Activity, OT Evaluation, and OT Therapeutic Exercise               Problem: Toileting     Dates: Start: 07/08/21       Goal: STG-Within one week, patient will complete toileting tasks     Dates: Start: 07/08/21       Goal Note filed on 07/08/21 1206 by Dennsi Alcantar MS,OTR/L     1) Individualized Goal:  Sup/SBA for toileting task via DME PRN  2) Interventions:  OT Self Care/ADL, OT Neuro Re-Ed/Balance, OT Therapeutic Activity, OT Evaluation, and OT Therapeutic Exercise

## 2021-07-09 NOTE — ED TRIAGE NOTES
Chief Complaint   Patient presents with   • Bloody Stools     BIB EMS from rehab facility for bloody stools. Most recent CBC resulted platelet count of 18. Pt had episode of syncope at 1105 that lasted approximately 20 sec. Pt states LOC is common for her.   • Loss of Consciousness     Pt had episode of syncope at 1105 that lasted approximately 20 sec. Pt states LOC is common for her.

## 2021-07-09 NOTE — PROGRESS NOTES
To ER via [facility]. Incontinent large amount of urine. Area cleansed bright red excoriation covered with barrier paste.

## 2021-07-09 NOTE — ASSESSMENT & PLAN NOTE
Suspecting due to nonhumidified oxygen through nose in the setting of thrombocytopenia.  Now resolved.  Consider humidified oxygen if needed while inpatient.

## 2021-07-09 NOTE — ASSESSMENT & PLAN NOTE
- After COVID-19 vaccination as per patient?.  - CTA of chest done on 4/27/2021 showed small left lower lobe subsegmental PE.    - However repeat CTA on 5/10/2021 showed resolution.  - Patient supposed to be on Eliquis for 3-month.    - Patient on Lovenox past few days since her Thrombocytopenia resolved  - Now Platelets at 252   - We will start Lovenox weight base due to Hx of PE   - Patient at risks of Bleeding and clotting   - We notified her about risks and benefits   - Patient OK with Medical Team starting Lovenox weight base  - CTM for Bleeding
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
## 2021-07-09 NOTE — ASSESSMENT & PLAN NOTE
Started on nystatin oral solution swish and swallow.  If no improvement clinically, could consider to add Diflucan.

## 2021-07-10 ENCOUNTER — APPOINTMENT (OUTPATIENT)
Dept: RADIOLOGY | Facility: MEDICAL CENTER | Age: 56
DRG: 441 | End: 2021-07-10
Payer: MEDICARE

## 2021-07-10 LAB
ALBUMIN SERPL BCP-MCNC: 1.7 G/DL (ref 3.2–4.9)
ALBUMIN SERPL BCP-MCNC: 2 G/DL (ref 3.2–4.9)
ALBUMIN/GLOB SERPL: 1.3 G/DL
ALBUMIN/GLOB SERPL: 1.3 G/DL
ALP SERPL-CCNC: 293 U/L (ref 30–99)
ALP SERPL-CCNC: 322 U/L (ref 30–99)
ALT SERPL-CCNC: 64 U/L (ref 2–50)
ALT SERPL-CCNC: 79 U/L (ref 2–50)
ANION GAP SERPL CALC-SCNC: 14 MMOL/L (ref 7–16)
ANION GAP SERPL CALC-SCNC: 15 MMOL/L (ref 7–16)
ANION GAP SERPL CALC-SCNC: 16 MMOL/L (ref 7–16)
APPEARANCE UR: ABNORMAL
APTT PPP: 51.1 SEC (ref 24.7–36)
AST SERPL-CCNC: 74 U/L (ref 12–45)
AST SERPL-CCNC: 95 U/L (ref 12–45)
BACTERIA #/AREA URNS HPF: ABNORMAL /HPF
BASOPHILS # BLD AUTO: 0.2 % (ref 0–1.8)
BASOPHILS # BLD AUTO: 0.2 % (ref 0–1.8)
BASOPHILS # BLD: 0.02 K/UL (ref 0–0.12)
BASOPHILS # BLD: 0.02 K/UL (ref 0–0.12)
BILIRUB CONJ SERPL-MCNC: 6.3 MG/DL (ref 0.1–0.5)
BILIRUB INDIRECT SERPL-MCNC: 0.8 MG/DL (ref 0–1)
BILIRUB SERPL-MCNC: 7.1 MG/DL (ref 0.1–1.5)
BILIRUB SERPL-MCNC: 7.9 MG/DL (ref 0.1–1.5)
BILIRUB UR QL STRIP.AUTO: ABNORMAL
BUN SERPL-MCNC: 13 MG/DL (ref 8–22)
BUN SERPL-MCNC: 14 MG/DL (ref 8–22)
BUN SERPL-MCNC: 15 MG/DL (ref 8–22)
CALCIUM SERPL-MCNC: 7 MG/DL (ref 8.5–10.5)
CALCIUM SERPL-MCNC: 7.2 MG/DL (ref 8.5–10.5)
CALCIUM SERPL-MCNC: 8 MG/DL (ref 8.5–10.5)
CHLORIDE SERPL-SCNC: 100 MMOL/L (ref 96–112)
CHLORIDE SERPL-SCNC: 95 MMOL/L (ref 96–112)
CHLORIDE SERPL-SCNC: 96 MMOL/L (ref 96–112)
CO2 SERPL-SCNC: 21 MMOL/L (ref 20–33)
CO2 SERPL-SCNC: 22 MMOL/L (ref 20–33)
CO2 SERPL-SCNC: 23 MMOL/L (ref 20–33)
COLOR UR: ABNORMAL
CREAT SERPL-MCNC: 0.97 MG/DL (ref 0.5–1.4)
CREAT SERPL-MCNC: 1.02 MG/DL (ref 0.5–1.4)
CREAT SERPL-MCNC: 1.18 MG/DL (ref 0.5–1.4)
CRP SERPL HS-MCNC: 5.68 MG/DL (ref 0–0.75)
D DIMER PPP IA.FEU-MCNC: 0.66 UG/ML (FEU) (ref 0–0.5)
EOSINOPHIL # BLD AUTO: 0 K/UL (ref 0–0.51)
EOSINOPHIL # BLD AUTO: 0.21 K/UL (ref 0–0.51)
EOSINOPHIL NFR BLD: 0 % (ref 0–6.9)
EOSINOPHIL NFR BLD: 2.4 % (ref 0–6.9)
EPI CELLS #/AREA URNS HPF: NEGATIVE /HPF
ERYTHROCYTE [DISTWIDTH] IN BLOOD BY AUTOMATED COUNT: 60.1 FL (ref 35.9–50)
ERYTHROCYTE [DISTWIDTH] IN BLOOD BY AUTOMATED COUNT: 60.3 FL (ref 35.9–50)
ERYTHROCYTE [SEDIMENTATION RATE] IN BLOOD BY WESTERGREN METHOD: 6 MM/HOUR (ref 0–25)
FIBRINOGEN PPP-MCNC: 111 MG/DL (ref 215–460)
GLOBULIN SER CALC-MCNC: 1.3 G/DL (ref 1.9–3.5)
GLOBULIN SER CALC-MCNC: 1.5 G/DL (ref 1.9–3.5)
GLUCOSE BLD-MCNC: 121 MG/DL (ref 65–99)
GLUCOSE BLD-MCNC: 125 MG/DL (ref 65–99)
GLUCOSE SERPL-MCNC: 108 MG/DL (ref 65–99)
GLUCOSE SERPL-MCNC: 112 MG/DL (ref 65–99)
GLUCOSE SERPL-MCNC: 159 MG/DL (ref 65–99)
GLUCOSE UR STRIP.AUTO-MCNC: NEGATIVE MG/DL
HCT VFR BLD AUTO: 29.6 % (ref 37–47)
HCT VFR BLD AUTO: 32.6 % (ref 37–47)
HCT VFR BLD AUTO: 34.3 % (ref 37–47)
HGB BLD-MCNC: 10.1 G/DL (ref 12–16)
HGB BLD-MCNC: 11.1 G/DL (ref 12–16)
HGB BLD-MCNC: 11.4 G/DL (ref 12–16)
HYALINE CASTS #/AREA URNS LPF: ABNORMAL /LPF
IMM GRANULOCYTES # BLD AUTO: 0.06 K/UL (ref 0–0.11)
IMM GRANULOCYTES # BLD AUTO: 0.08 K/UL (ref 0–0.11)
IMM GRANULOCYTES NFR BLD AUTO: 0.7 % (ref 0–0.9)
IMM GRANULOCYTES NFR BLD AUTO: 0.9 % (ref 0–0.9)
INR PPP: 2.22 (ref 0.87–1.13)
KETONES UR STRIP.AUTO-MCNC: NEGATIVE MG/DL
LACTATE BLD-SCNC: 5.9 MMOL/L (ref 0.5–2)
LACTATE BLD-SCNC: 6.1 MMOL/L (ref 0.5–2)
LACTATE BLD-SCNC: 7.3 MMOL/L (ref 0.5–2)
LACTATE BLD-SCNC: 7.4 MMOL/L (ref 0.5–2)
LACTATE BLD-SCNC: 7.6 MMOL/L (ref 0.5–2)
LACTATE BLD-SCNC: 8 MMOL/L (ref 0.5–2)
LDH SERPL L TO P-CCNC: 297 U/L (ref 107–266)
LEUKOCYTE ESTERASE UR QL STRIP.AUTO: ABNORMAL
LIPASE SERPL-CCNC: 52 U/L (ref 11–82)
LYMPHOCYTES # BLD AUTO: 1.17 K/UL (ref 1–4.8)
LYMPHOCYTES # BLD AUTO: 1.32 K/UL (ref 1–4.8)
LYMPHOCYTES NFR BLD: 13.7 % (ref 22–41)
LYMPHOCYTES NFR BLD: 15.1 % (ref 22–41)
MAGNESIUM SERPL-MCNC: 1.7 MG/DL (ref 1.5–2.5)
MCH RBC QN AUTO: 29.8 PG (ref 27–33)
MCH RBC QN AUTO: 30 PG (ref 27–33)
MCHC RBC AUTO-ENTMCNC: 33.2 G/DL (ref 33.6–35)
MCHC RBC AUTO-ENTMCNC: 34 G/DL (ref 33.6–35)
MCV RBC AUTO: 88.1 FL (ref 81.4–97.8)
MCV RBC AUTO: 89.6 FL (ref 81.4–97.8)
MICRO URNS: ABNORMAL
MONOCYTES # BLD AUTO: 0.63 K/UL (ref 0–0.85)
MONOCYTES # BLD AUTO: 0.85 K/UL (ref 0–0.85)
MONOCYTES NFR BLD AUTO: 7.4 % (ref 0–13.4)
MONOCYTES NFR BLD AUTO: 9.7 % (ref 0–13.4)
NEUTROPHILS # BLD AUTO: 6.27 K/UL (ref 2–7.15)
NEUTROPHILS # BLD AUTO: 6.69 K/UL (ref 2–7.15)
NEUTROPHILS NFR BLD: 71.7 % (ref 44–72)
NEUTROPHILS NFR BLD: 78 % (ref 44–72)
NITRITE UR QL STRIP.AUTO: POSITIVE
NRBC # BLD AUTO: 0.17 K/UL
NRBC # BLD AUTO: 0.22 K/UL
NRBC BLD-RTO: 1.9 /100 WBC
NRBC BLD-RTO: 2.6 /100 WBC
PH UR STRIP.AUTO: 5 [PH] (ref 5–8)
PHOSPHATE SERPL-MCNC: 4.8 MG/DL (ref 2.5–4.5)
PLATELET # BLD AUTO: 65 K/UL (ref 164–446)
PLATELET # BLD AUTO: 70 K/UL (ref 164–446)
PMV BLD AUTO: 12.6 FL (ref 9–12.9)
POTASSIUM SERPL-SCNC: 5 MMOL/L (ref 3.6–5.5)
POTASSIUM SERPL-SCNC: 5.3 MMOL/L (ref 3.6–5.5)
POTASSIUM SERPL-SCNC: 5.6 MMOL/L (ref 3.6–5.5)
POTASSIUM SERPL-SCNC: 5.7 MMOL/L (ref 3.6–5.5)
PROT SERPL-MCNC: 3 G/DL (ref 6–8.2)
PROT SERPL-MCNC: 3.5 G/DL (ref 6–8.2)
PROT UR QL STRIP: NEGATIVE MG/DL
PROTHROMBIN TIME: 23.9 SEC (ref 12–14.6)
RBC # BLD AUTO: 3.7 M/UL (ref 4.2–5.4)
RBC # BLD AUTO: 3.83 M/UL (ref 4.2–5.4)
RBC # URNS HPF: ABNORMAL /HPF
RBC UR QL AUTO: NEGATIVE
RHEUMATOID FACT SER IA-ACNC: <10 IU/ML (ref 0–14)
SODIUM SERPL-SCNC: 132 MMOL/L (ref 135–145)
SODIUM SERPL-SCNC: 132 MMOL/L (ref 135–145)
SODIUM SERPL-SCNC: 138 MMOL/L (ref 135–145)
SP GR UR STRIP.AUTO: 1.01
UROBILINOGEN UR STRIP.AUTO-MCNC: 0.2 MG/DL
WBC # BLD AUTO: 8.6 K/UL (ref 4.8–10.8)
WBC # BLD AUTO: 8.8 K/UL (ref 4.8–10.8)
WBC #/AREA URNS HPF: ABNORMAL /HPF

## 2021-07-10 PROCEDURE — 700117 HCHG RX CONTRAST REV CODE 255

## 2021-07-10 PROCEDURE — 700105 HCHG RX REV CODE 258: Performed by: FAMILY MEDICINE

## 2021-07-10 PROCEDURE — 36430 TRANSFUSION BLD/BLD COMPNT: CPT

## 2021-07-10 PROCEDURE — 76705 ECHO EXAM OF ABDOMEN: CPT

## 2021-07-10 PROCEDURE — 700105 HCHG RX REV CODE 258: Performed by: HOSPITALIST

## 2021-07-10 PROCEDURE — 83735 ASSAY OF MAGNESIUM: CPT

## 2021-07-10 PROCEDURE — 84132 ASSAY OF SERUM POTASSIUM: CPT

## 2021-07-10 PROCEDURE — P9047 ALBUMIN (HUMAN), 25%, 50ML: HCPCS | Mod: JG

## 2021-07-10 PROCEDURE — 84100 ASSAY OF PHOSPHORUS: CPT

## 2021-07-10 PROCEDURE — 81001 URINALYSIS AUTO W/SCOPE: CPT

## 2021-07-10 PROCEDURE — 85652 RBC SED RATE AUTOMATED: CPT

## 2021-07-10 PROCEDURE — A9270 NON-COVERED ITEM OR SERVICE: HCPCS | Performed by: FAMILY MEDICINE

## 2021-07-10 PROCEDURE — 85730 THROMBOPLASTIN TIME PARTIAL: CPT

## 2021-07-10 PROCEDURE — 86431 RHEUMATOID FACTOR QUANT: CPT

## 2021-07-10 PROCEDURE — 770024 HCHG ROOM/CARE - REHAB LOA (180)

## 2021-07-10 PROCEDURE — 85384 FIBRINOGEN ACTIVITY: CPT

## 2021-07-10 PROCEDURE — 700102 HCHG RX REV CODE 250 W/ 637 OVERRIDE(OP): Performed by: HOSPITALIST

## 2021-07-10 PROCEDURE — 80048 BASIC METABOLIC PNL TOTAL CA: CPT

## 2021-07-10 PROCEDURE — 97162 PT EVAL MOD COMPLEX 30 MIN: CPT

## 2021-07-10 PROCEDURE — 85014 HEMATOCRIT: CPT

## 2021-07-10 PROCEDURE — 80053 COMPREHEN METABOLIC PANEL: CPT

## 2021-07-10 PROCEDURE — 99233 SBSQ HOSP IP/OBS HIGH 50: CPT | Mod: GC | Performed by: HOSPITALIST

## 2021-07-10 PROCEDURE — 85379 FIBRIN DEGRADATION QUANT: CPT

## 2021-07-10 PROCEDURE — 83010 ASSAY OF HAPTOGLOBIN QUANT: CPT

## 2021-07-10 PROCEDURE — 700101 HCHG RX REV CODE 250

## 2021-07-10 PROCEDURE — 85018 HEMOGLOBIN: CPT

## 2021-07-10 PROCEDURE — 700101 HCHG RX REV CODE 250: Performed by: STUDENT IN AN ORGANIZED HEALTH CARE EDUCATION/TRAINING PROGRAM

## 2021-07-10 PROCEDURE — 700111 HCHG RX REV CODE 636 W/ 250 OVERRIDE (IP): Performed by: HOSPITALIST

## 2021-07-10 PROCEDURE — 83615 LACTATE (LD) (LDH) ENZYME: CPT

## 2021-07-10 PROCEDURE — A9270 NON-COVERED ITEM OR SERVICE: HCPCS | Performed by: INTERNAL MEDICINE

## 2021-07-10 PROCEDURE — 700102 HCHG RX REV CODE 250 W/ 637 OVERRIDE(OP): Performed by: INTERNAL MEDICINE

## 2021-07-10 PROCEDURE — 83605 ASSAY OF LACTIC ACID: CPT

## 2021-07-10 PROCEDURE — A9270 NON-COVERED ITEM OR SERVICE: HCPCS | Performed by: HOSPITALIST

## 2021-07-10 PROCEDURE — 99221 1ST HOSP IP/OBS SF/LOW 40: CPT | Performed by: INTERNAL MEDICINE

## 2021-07-10 PROCEDURE — 700102 HCHG RX REV CODE 250 W/ 637 OVERRIDE(OP): Performed by: FAMILY MEDICINE

## 2021-07-10 PROCEDURE — 83690 ASSAY OF LIPASE: CPT

## 2021-07-10 PROCEDURE — 700111 HCHG RX REV CODE 636 W/ 250 OVERRIDE (IP)

## 2021-07-10 PROCEDURE — P9017 PLASMA 1 DONOR FRZ W/IN 8 HR: HCPCS

## 2021-07-10 PROCEDURE — 86038 ANTINUCLEAR ANTIBODIES: CPT

## 2021-07-10 PROCEDURE — 85610 PROTHROMBIN TIME: CPT

## 2021-07-10 PROCEDURE — 82962 GLUCOSE BLOOD TEST: CPT

## 2021-07-10 PROCEDURE — 74174 CTA ABD&PLVS W/CONTRAST: CPT | Mod: MG

## 2021-07-10 PROCEDURE — 85025 COMPLETE CBC W/AUTO DIFF WBC: CPT

## 2021-07-10 PROCEDURE — 700102 HCHG RX REV CODE 250 W/ 637 OVERRIDE(OP): Performed by: STUDENT IN AN ORGANIZED HEALTH CARE EDUCATION/TRAINING PROGRAM

## 2021-07-10 PROCEDURE — 86140 C-REACTIVE PROTEIN: CPT

## 2021-07-10 PROCEDURE — 770020 HCHG ROOM/CARE - TELE (206)

## 2021-07-10 PROCEDURE — 82248 BILIRUBIN DIRECT: CPT

## 2021-07-10 PROCEDURE — 97166 OT EVAL MOD COMPLEX 45 MIN: CPT

## 2021-07-10 PROCEDURE — 700111 HCHG RX REV CODE 636 W/ 250 OVERRIDE (IP): Performed by: FAMILY MEDICINE

## 2021-07-10 RX ORDER — DEXTROSE MONOHYDRATE 25 G/50ML
50 INJECTION, SOLUTION INTRAVENOUS ONCE
Status: COMPLETED | OUTPATIENT
Start: 2021-07-10 | End: 2021-07-10

## 2021-07-10 RX ORDER — BACITRACIN ZINC 500 [USP'U]/G
OINTMENT TOPICAL 2 TIMES DAILY
Status: DISPENSED | OUTPATIENT
Start: 2021-07-10 | End: 2021-07-15

## 2021-07-10 RX ORDER — ALBUMIN (HUMAN) 12.5 G/50ML
100 SOLUTION INTRAVENOUS ONCE
Status: COMPLETED | OUTPATIENT
Start: 2021-07-10 | End: 2021-07-10

## 2021-07-10 RX ORDER — NYSTATIN 100000 [USP'U]/G
POWDER TOPICAL 3 TIMES DAILY
Status: DISPENSED | OUTPATIENT
Start: 2021-07-10 | End: 2021-07-17

## 2021-07-10 RX ORDER — DIPHENHYDRAMINE HYDROCHLORIDE 50 MG/ML
25 INJECTION INTRAMUSCULAR; INTRAVENOUS ONCE
Status: COMPLETED | OUTPATIENT
Start: 2021-07-10 | End: 2021-07-10

## 2021-07-10 RX ORDER — DIPHENHYDRAMINE HYDROCHLORIDE 50 MG/ML
25 INJECTION INTRAMUSCULAR; INTRAVENOUS ONCE
Status: DISCONTINUED | OUTPATIENT
Start: 2021-07-10 | End: 2021-07-10

## 2021-07-10 RX ADMIN — VANCOMYCIN HYDROCHLORIDE 2000 MG: 500 INJECTION, POWDER, LYOPHILIZED, FOR SOLUTION INTRAVENOUS at 20:55

## 2021-07-10 RX ADMIN — NYSTATIN 500000 UNITS: 100000 SUSPENSION ORAL at 16:45

## 2021-07-10 RX ADMIN — COLESEVELAM HYDROCHLORIDE 625 MG: 625 TABLET, COATED ORAL at 20:57

## 2021-07-10 RX ADMIN — DEXTROSE MONOHYDRATE 50 ML: 25 INJECTION, SOLUTION INTRAVENOUS at 07:41

## 2021-07-10 RX ADMIN — LEVOTHYROXINE SODIUM 75 MCG: 0.07 TABLET ORAL at 05:03

## 2021-07-10 RX ADMIN — DEXTROSE MONOHYDRATE 50 ML: 25 INJECTION, SOLUTION INTRAVENOUS at 01:50

## 2021-07-10 RX ADMIN — Medication 2000 UNITS: at 05:03

## 2021-07-10 RX ADMIN — AMITRIPTYLINE HYDROCHLORIDE 10 MG: 10 TABLET, FILM COATED ORAL at 05:03

## 2021-07-10 RX ADMIN — DEXTROSE AND SODIUM CHLORIDE: 5; 900 INJECTION, SOLUTION INTRAVENOUS at 11:28

## 2021-07-10 RX ADMIN — SUMATRIPTAN SUCCINATE 50 MG: 50 TABLET ORAL at 08:18

## 2021-07-10 RX ADMIN — ALBUMIN (HUMAN) 100 G: 5 SOLUTION INTRAVENOUS at 16:49

## 2021-07-10 RX ADMIN — MEROPENEM 0.5 G: 500 INJECTION, POWDER, FOR SOLUTION INTRAVENOUS at 02:03

## 2021-07-10 RX ADMIN — Medication: at 20:57

## 2021-07-10 RX ADMIN — CALCITONIN SALMON 200 UNITS: 200 SPRAY, METERED NASAL at 20:58

## 2021-07-10 RX ADMIN — MEROPENEM 0.5 G: 500 INJECTION, POWDER, FOR SOLUTION INTRAVENOUS at 20:55

## 2021-07-10 RX ADMIN — COLESEVELAM HYDROCHLORIDE 625 MG: 625 TABLET, COATED ORAL at 07:41

## 2021-07-10 RX ADMIN — IOHEXOL 100 ML: 350 INJECTION, SOLUTION INTRAVENOUS at 11:46

## 2021-07-10 RX ADMIN — GABAPENTIN 400 MG: 400 CAPSULE ORAL at 16:45

## 2021-07-10 RX ADMIN — SUMATRIPTAN SUCCINATE 50 MG: 50 TABLET ORAL at 17:53

## 2021-07-10 RX ADMIN — MIDODRINE HYDROCHLORIDE 5 MG: 5 TABLET ORAL at 07:41

## 2021-07-10 RX ADMIN — MEROPENEM 0.5 G: 500 INJECTION, POWDER, FOR SOLUTION INTRAVENOUS at 07:41

## 2021-07-10 RX ADMIN — NYSTATIN: 100000 POWDER TOPICAL at 16:45

## 2021-07-10 RX ADMIN — INSULIN HUMAN 10 UNITS: 100 INJECTION, SOLUTION PARENTERAL at 01:49

## 2021-07-10 RX ADMIN — MIDODRINE HYDROCHLORIDE 5 MG: 5 TABLET ORAL at 16:45

## 2021-07-10 RX ADMIN — NYSTATIN 500000 UNITS: 100000 SUSPENSION ORAL at 11:57

## 2021-07-10 RX ADMIN — HYDROCORTISONE SODIUM SUCCINATE 100 MG: 100 INJECTION, POWDER, FOR SOLUTION INTRAMUSCULAR; INTRAVENOUS at 05:03

## 2021-07-10 RX ADMIN — LIOTHYRONINE SODIUM 25 MCG: 25 TABLET ORAL at 20:58

## 2021-07-10 RX ADMIN — HYDROCORTISONE SODIUM SUCCINATE 150 MG: 100 INJECTION, POWDER, FOR SOLUTION INTRAMUSCULAR; INTRAVENOUS at 22:14

## 2021-07-10 RX ADMIN — NYSTATIN: 100000 POWDER TOPICAL at 11:57

## 2021-07-10 RX ADMIN — NYSTATIN 500000 UNITS: 100000 SUSPENSION ORAL at 20:55

## 2021-07-10 RX ADMIN — DULOXETINE HYDROCHLORIDE 60 MG: 60 CAPSULE, DELAYED RELEASE ORAL at 21:00

## 2021-07-10 RX ADMIN — HYDROCORTISONE SODIUM SUCCINATE 150 MG: 100 INJECTION, POWDER, FOR SOLUTION INTRAMUSCULAR; INTRAVENOUS at 14:28

## 2021-07-10 RX ADMIN — DEXTROSE MONOHYDRATE 50 ML: 25 INJECTION, SOLUTION INTRAVENOUS at 01:48

## 2021-07-10 RX ADMIN — NYSTATIN 500000 UNITS: 100000 SUSPENSION ORAL at 07:40

## 2021-07-10 RX ADMIN — MEROPENEM 0.5 G: 500 INJECTION, POWDER, FOR SOLUTION INTRAVENOUS at 14:28

## 2021-07-10 RX ADMIN — ACETAMINOPHEN 650 MG: 325 TABLET, FILM COATED ORAL at 02:03

## 2021-07-10 RX ADMIN — DIPHENHYDRAMINE HYDROCHLORIDE 25 MG: 50 INJECTION INTRAMUSCULAR; INTRAVENOUS at 10:28

## 2021-07-10 RX ADMIN — MIDODRINE HYDROCHLORIDE 5 MG: 5 TABLET ORAL at 11:57

## 2021-07-10 RX ADMIN — COLESEVELAM HYDROCHLORIDE 625 MG: 625 TABLET, COATED ORAL at 14:27

## 2021-07-10 ASSESSMENT — ENCOUNTER SYMPTOMS
DOUBLE VISION: 0
VOMITING: 0
LOSS OF CONSCIOUSNESS: 1
FALLS: 1
BLURRED VISION: 0
BLOOD IN STOOL: 1
SHORTNESS OF BREATH: 0
HEARTBURN: 0
FEVER: 0
ABDOMINAL PAIN: 1
ABDOMINAL PAIN: 0
DIZZINESS: 1
PALPITATIONS: 0
BACK PAIN: 1
NAUSEA: 1
CHILLS: 0
SORE THROAT: 1
WEAKNESS: 1
CONSTIPATION: 0
BRUISES/BLEEDS EASILY: 1
HEMOPTYSIS: 0
ORTHOPNEA: 0
WEIGHT LOSS: 0
COUGH: 0

## 2021-07-10 ASSESSMENT — ACTIVITIES OF DAILY LIVING (ADL): TOILETING: INDEPENDENT

## 2021-07-10 ASSESSMENT — COGNITIVE AND FUNCTIONAL STATUS - GENERAL
SUGGESTED CMS G CODE MODIFIER MOBILITY: CN
EATING MEALS: A LITTLE
TURNING FROM BACK TO SIDE WHILE IN FLAT BAD: UNABLE
CLIMB 3 TO 5 STEPS WITH RAILING: TOTAL
DRESSING REGULAR LOWER BODY CLOTHING: A LOT
TOILETING: A LOT
HELP NEEDED FOR BATHING: A LOT
DRESSING REGULAR UPPER BODY CLOTHING: A LOT
PERSONAL GROOMING: A LITTLE
MOVING TO AND FROM BED TO CHAIR: UNABLE
STANDING UP FROM CHAIR USING ARMS: TOTAL
SUGGESTED CMS G CODE MODIFIER DAILY ACTIVITY: CK
DAILY ACTIVITIY SCORE: 14
WALKING IN HOSPITAL ROOM: TOTAL
MOBILITY SCORE: 6
MOVING FROM LYING ON BACK TO SITTING ON SIDE OF FLAT BED: UNABLE

## 2021-07-10 ASSESSMENT — FIBROSIS 4 INDEX: FIB4 SCORE: 8.11

## 2021-07-10 ASSESSMENT — GAIT ASSESSMENTS: GAIT LEVEL OF ASSIST: UNABLE TO PARTICIPATE

## 2021-07-10 ASSESSMENT — PAIN DESCRIPTION - PAIN TYPE: TYPE: ACUTE PAIN

## 2021-07-10 NOTE — CODE DOCUMENTATION
Pt had another witnessed syncopal episode while rapid response team was present, lasting about 30 seconds. Vitals stable, no changes in heart rate per monitor room.

## 2021-07-10 NOTE — PROGRESS NOTES
"Rehab Progress Note     Encounter Date: 7/9/2021    CC: Orthostatic hypotension, syncope    Interval Events (Subjective)  Platelets worse overnight. Patient sitting up in room. She appears pale in trendelenburg. She is able to hold conversation. She reports she had a nose bleed overnight and passed gas in the toilet noticing bright red blood.  She reports too weak for therapy. Discussed with hospitalist and transfer to ED, probably needs platelet transfusion and discussion with GI.  She does have a history of diverticulitis. She is on anticoagulation.     IDT Team Meeting 7/1/2021  DC/Disposition:  7/8/21 -> 7/12/21    Objective:  VITAL SIGNS: /82   Pulse (!) 103   Temp 36.9 °C (98.5 °F) (Oral)   Resp 19   Ht 1.626 m (5' 4\")   Wt 100 kg (220 lb 10.9 oz)   LMP  (LMP Unknown)   SpO2 97%   BMI 37.88 kg/m²    Gen: NAD, pale appearing  Psych: Mood and affect appropriate  CV: RRR, no edema  Resp: CTAB, no upper airway sounds  Abd: NTND  Neuro: AOx4, following commands,     Recent Results (from the past 72 hour(s))   Basic Metabolic Panel    Collection Time: 07/07/21  5:38 AM   Result Value Ref Range    Sodium 140 135 - 145 mmol/L    Potassium 3.9 3.6 - 5.5 mmol/L    Chloride 101 96 - 112 mmol/L    Co2 30 20 - 33 mmol/L    Glucose 93 65 - 99 mg/dL    Bun 8 8 - 22 mg/dL    Creatinine 0.39 (L) 0.50 - 1.40 mg/dL    Calcium 7.6 (L) 8.5 - 10.5 mg/dL    Anion Gap 9.0 7.0 - 16.0   ESTIMATED GFR    Collection Time: 07/07/21  5:38 AM   Result Value Ref Range    GFR If African American >60 >60 mL/min/1.73 m 2    GFR If Non African American >60 >60 mL/min/1.73 m 2   CBC WITHOUT DIFFERENTIAL    Collection Time: 07/07/21  8:45 AM   Result Value Ref Range    WBC 6.5 4.8 - 10.8 K/uL    RBC 4.24 4.20 - 5.40 M/uL    Hemoglobin 12.7 12.0 - 16.0 g/dL    Hematocrit 38.1 37.0 - 47.0 %    MCV 89.9 81.4 - 97.8 fL    MCH 30.0 27.0 - 33.0 pg    MCHC 33.3 (L) 33.6 - 35.0 g/dL    RDW 61.1 (H) 35.9 - 50.0 fL    Platelet Count 30 (LL) " 164 - 446 K/uL   PERIPHERAL SMEAR REVIEW    Collection Time: 07/07/21  8:45 AM   Result Value Ref Range    Peripheral Smear Review see below    IMMATURE PLT FRACTION    Collection Time: 07/07/21  8:45 AM   Result Value Ref Range    Imm. Plt Fraction 32.1 (H) 0.6 - 13.1 K/uL   POCT glucose device results    Collection Time: 07/07/21  9:31 AM   Result Value Ref Range    Glucose - Accu-Ck 83 65 - 99 mg/dL   proBrain Natriuretic Peptide, NT    Collection Time: 07/07/21  9:45 AM   Result Value Ref Range    NT-proBNP 314 (H) 0 - 125 pg/mL   TROPONIN    Collection Time: 07/07/21  9:45 AM   Result Value Ref Range    Troponin T 7 6 - 19 ng/L   CULTURE WOUND W/ GRAM STAIN    Collection Time: 07/07/21 11:40 AM    Specimen: Head; Wound   Result Value Ref Range    Significant Indicator POS (POS)     Source WND     Site Sinus     Culture Result Light growth usual upper respiratory derick. (A)     Gram Stain Result       Rare epithelial cells.  Moderate Gram negative rods.      Culture Result (A)      Klebsiella pneumoniae  Moderate growth  Susceptibilities in progress     GRAM STAIN    Collection Time: 07/07/21 11:40 AM    Specimen: Wound   Result Value Ref Range    Significant Indicator .     Source WND     Site Sinus     Gram Stain Result       Rare epithelial cells.  Moderate Gram negative rods.     MAGNESIUM    Collection Time: 07/08/21  5:50 AM   Result Value Ref Range    Magnesium 1.4 (L) 1.5 - 2.5 mg/dL   PHOSPHORUS    Collection Time: 07/08/21  5:50 AM   Result Value Ref Range    Phosphorus 4.6 (H) 2.5 - 4.5 mg/dL   CBC WITH DIFFERENTIAL    Collection Time: 07/08/21  5:50 AM   Result Value Ref Range    WBC 4.7 (L) 4.8 - 10.8 K/uL    RBC 3.61 (L) 4.20 - 5.40 M/uL    Hemoglobin 11.0 (L) 12.0 - 16.0 g/dL    Hematocrit 32.3 (L) 37.0 - 47.0 %    MCV 89.5 81.4 - 97.8 fL    MCH 30.5 27.0 - 33.0 pg    MCHC 34.1 33.6 - 35.0 g/dL    RDW 60.9 (H) 35.9 - 50.0 fL    Platelet Count 24 (LL) 164 - 446 K/uL    Neutrophils-Polys 58.20 44.00  - 72.00 %    Lymphocytes 31.90 22.00 - 41.00 %    Monocytes 9.30 0.00 - 13.40 %    Eosinophils 0.00 0.00 - 6.90 %    Basophils 0.20 0.00 - 1.80 %    Immature Granulocytes 0.40 0.00 - 0.90 %    Nucleated RBC 0.80 /100 WBC    Neutrophils (Absolute) 2.76 2.00 - 7.15 K/uL    Lymphs (Absolute) 1.51 1.00 - 4.80 K/uL    Monos (Absolute) 0.44 0.00 - 0.85 K/uL    Eos (Absolute) 0.00 0.00 - 0.51 K/uL    Baso (Absolute) 0.01 0.00 - 0.12 K/uL    Immature Granulocytes (abs) 0.02 0.00 - 0.11 K/uL    NRBC (Absolute) 0.04 K/uL   Comp Metabolic Panel    Collection Time: 07/08/21  5:50 AM   Result Value Ref Range    Sodium 139 135 - 145 mmol/L    Potassium 4.3 3.6 - 5.5 mmol/L    Chloride 98 96 - 112 mmol/L    Co2 29 20 - 33 mmol/L    Anion Gap 12.0 7.0 - 16.0    Glucose 99 65 - 99 mg/dL    Bun 10 8 - 22 mg/dL    Creatinine 0.53 0.50 - 1.40 mg/dL    Calcium 7.3 (L) 8.5 - 10.5 mg/dL    AST(SGOT) 87 (H) 12 - 45 U/L    ALT(SGPT) 90 (H) 2 - 50 U/L    Alkaline Phosphatase 291 (H) 30 - 99 U/L    Total Bilirubin 6.2 (H) 0.1 - 1.5 mg/dL    Albumin 2.0 (L) 3.2 - 4.9 g/dL    Total Protein 3.3 (L) 6.0 - 8.2 g/dL    Globulin 1.3 (L) 1.9 - 3.5 g/dL    A-G Ratio 1.5 g/dL   ESTIMATED GFR    Collection Time: 07/08/21  5:50 AM   Result Value Ref Range    GFR If African American >60 >60 mL/min/1.73 m 2    GFR If Non African American >60 >60 mL/min/1.73 m 2   PERIPHERAL SMEAR REVIEW    Collection Time: 07/08/21  5:50 AM   Result Value Ref Range    Peripheral Smear Review see below    IMMATURE PLT FRACTION    Collection Time: 07/08/21  5:50 AM   Result Value Ref Range    Imm. Plt Fraction 33.5 (H) 0.6 - 13.1 K/uL   DIFFERENTIAL COMMENT    Collection Time: 07/08/21  5:50 AM   Result Value Ref Range    Comments-Diff see below    CBC WITHOUT DIFFERENTIAL    Collection Time: 07/09/21  6:00 AM   Result Value Ref Range    WBC 6.2 4.8 - 10.8 K/uL    RBC 4.06 (L) 4.20 - 5.40 M/uL    Hemoglobin 11.9 (L) 12.0 - 16.0 g/dL    Hematocrit 35.8 (L) 37.0 - 47.0 %     MCV 88.2 81.4 - 97.8 fL    MCH 29.3 27.0 - 33.0 pg    MCHC 33.2 (L) 33.6 - 35.0 g/dL    RDW 60.7 (H) 35.9 - 50.0 fL    Platelet Count 18 (LL) 164 - 446 K/uL   CBC WITH DIFFERENTIAL    Collection Time: 07/09/21 11:16 AM   Result Value Ref Range    WBC 6.4 4.8 - 10.8 K/uL    RBC 4.00 (L) 4.20 - 5.40 M/uL    Hemoglobin 11.9 (L) 12.0 - 16.0 g/dL    Hematocrit 34.9 (L) 37.0 - 47.0 %    MCV 87.3 81.4 - 97.8 fL    MCH 29.8 27.0 - 33.0 pg    MCHC 34.1 33.6 - 35.0 g/dL    RDW 59.6 (H) 35.9 - 50.0 fL    Platelet Count 23 (LL) 164 - 446 K/uL    Neutrophils-Polys 68.90 44.00 - 72.00 %    Lymphocytes 21.00 (L) 22.00 - 41.00 %    Monocytes 9.50 0.00 - 13.40 %    Eosinophils 0.00 0.00 - 6.90 %    Basophils 0.00 0.00 - 1.80 %    Immature Granulocytes 0.60 0.00 - 0.90 %    Nucleated RBC 3.60 /100 WBC    Neutrophils (Absolute) 4.44 2.00 - 7.15 K/uL    Lymphs (Absolute) 1.35 1.00 - 4.80 K/uL    Monos (Absolute) 0.61 0.00 - 0.85 K/uL    Eos (Absolute) 0.00 0.00 - 0.51 K/uL    Baso (Absolute) 0.00 0.00 - 0.12 K/uL    Immature Granulocytes (abs) 0.04 0.00 - 0.11 K/uL    NRBC (Absolute) 0.23 K/uL   COMP METABOLIC PANEL    Collection Time: 07/09/21 11:16 AM   Result Value Ref Range    Sodium 133 (L) 135 - 145 mmol/L    Potassium 5.2 3.6 - 5.5 mmol/L    Chloride 94 (L) 96 - 112 mmol/L    Co2 23 20 - 33 mmol/L    Anion Gap 16.0 7.0 - 16.0    Glucose 59 (L) 65 - 99 mg/dL    Bun 11 8 - 22 mg/dL    Creatinine 0.78 0.50 - 1.40 mg/dL    Calcium 7.3 (L) 8.5 - 10.5 mg/dL    AST(SGOT) 99 (H) 12 - 45 U/L    ALT(SGPT) 86 (H) 2 - 50 U/L    Alkaline Phosphatase 324 (H) 30 - 99 U/L    Total Bilirubin 7.6 (H) 0.1 - 1.5 mg/dL    Albumin 2.0 (L) 3.2 - 4.9 g/dL    Total Protein 3.4 (L) 6.0 - 8.2 g/dL    Globulin 1.4 (L) 1.9 - 3.5 g/dL    A-G Ratio 1.4 g/dL   TROPONIN    Collection Time: 07/09/21 11:16 AM   Result Value Ref Range    Troponin T 7 6 - 19 ng/L   COD (ADULT)    Collection Time: 07/09/21 11:16 AM   Result Value Ref Range    ABO Grouping Only O      Rh Grouping Only POS     Antibody Screen-Cod NEG    ESTIMATED GFR    Collection Time: 21 11:16 AM   Result Value Ref Range    GFR If African American >60 >60 mL/min/1.73 m 2    GFR If Non African American >60 >60 mL/min/1.73 m 2   IMMATURE PLT FRACTION    Collection Time: 21 11:16 AM   Result Value Ref Range    Imm. Plt Fraction 47.7 (H) 0.6 - 13.1 K/uL   PERIPHERAL SMEAR REVIEW    Collection Time: 21 11:16 AM   Result Value Ref Range    Peripheral Smear Review see below    PLATELET ESTIMATE    Collection Time: 21 11:16 AM   Result Value Ref Range    Plt Estimation Marked Decrease    DIFFERENTIAL COMMENT    Collection Time: 21 11:16 AM   Result Value Ref Range    Comments-Diff see below    PROTHROMBIN TIME (INR)    Collection Time: 21 11:29 AM   Result Value Ref Range    PT 26.1 (H) 12.0 - 14.6 sec    INR 2.49 (H) 0.87 - 1.13   APTT    Collection Time: 21 11:29 AM   Result Value Ref Range    APTT 52.3 (H) 24.7 - 36.0 sec   PLATELETS REQUEST    Collection Time: 21 12:21 PM   Result Value Ref Range    Component P       PTP                 Plts,Pheresis       [id number]   issued       21   13:24      Product Type Platelets  Pheresis LR     Dispense Status issued     Unit Number (Barcoded) [id number]     Product Code (Barcoded) N2300X99     Blood Type (Barcoded) 1700    EKG (NOW)    Collection Time: 21 12:46 PM   Result Value Ref Range    Report       [facility] Emergency Dept.    Test Date:  2021  Pt Name:    CARMINA MORAN              Department: ER  MRN:        [id number]                      Room:       [facility]  Gender:     Female                       Technician: [id number]  :        1965                   Requested By:MARIETTA ANDERSON  Order #:    [id number]                    Cony MD:    Measurements  Intervals                                Axis  Rate:       96                           P:          9  AL:         192                           QRS:        -2  QRSD:       78                           T:          122  QT:         356  QTc:        450    Interpretive Statements  SINUS RHYTHM  NONSPECIFIC T ABNORMALITIES, LATERAL LEADS  Compared to ECG 07/07/2021 09:59:27  No significant changes     RETICULOCYTES COUNT    Collection Time: 07/09/21  3:10 PM   Result Value Ref Range    Reticulocyte Count 2.4 (H) 0.8 - 2.1 %    Retic, Absolute 0.09 (H) 0.04 - 0.06 M/uL    Imm. Reticulocyte Fraction 38.1 (H) 9.3 - 17.4 %    Retic Hgb Equivalent 30.2 29.0 - 35.0 pg/cell   IRON/TOTAL IRON BIND    Collection Time: 07/09/21  3:10 PM   Result Value Ref Range    Iron 113 40 - 170 ug/dL    Total Iron Binding 130 (L) 250 - 450 ug/dL    Unsat Iron Binding <17 (L) 110 - 370 ug/dL    % Saturation 87 (H) 15 - 55 %   FERRITIN    Collection Time: 07/09/21  3:10 PM   Result Value Ref Range    Ferritin 136.0 10.0 - 291.0 ng/mL   MAGNESIUM    Collection Time: 07/09/21  3:10 PM   Result Value Ref Range    Magnesium 1.4 (L) 1.5 - 2.5 mg/dL   PHOSPHORUS    Collection Time: 07/09/21  3:10 PM   Result Value Ref Range    Phosphorus 5.0 (H) 2.5 - 4.5 mg/dL   VITAMIN B12    Collection Time: 07/09/21  3:10 PM   Result Value Ref Range    Vitamin B12 -True Cobalamin 3701 (H) 211 - 911 pg/mL   LACTIC ACID    Collection Time: 07/09/21  3:10 PM   Result Value Ref Range    Lactic Acid 6.5 (HH) 0.5 - 2.0 mmol/L   CBC WITH DIFFERENTIAL    Collection Time: 07/09/21  3:10 PM   Result Value Ref Range    WBC 7.6 4.8 - 10.8 K/uL    RBC 3.63 (L) 4.20 - 5.40 M/uL    Hemoglobin 11.0 (L) 12.0 - 16.0 g/dL    Hematocrit 32.3 (L) 37.0 - 47.0 %    MCV 89.0 81.4 - 97.8 fL    MCH 30.3 27.0 - 33.0 pg    MCHC 34.1 33.6 - 35.0 g/dL    RDW 59.4 (H) 35.9 - 50.0 fL    Platelet Count 72 (L) 164 - 446 K/uL    MPV 13.1 (H) 9.0 - 12.9 fL    Neutrophils-Polys 77.10 (H) 44.00 - 72.00 %    Lymphocytes 13.10 (L) 22.00 - 41.00 %    Monocytes 8.90 0.00 - 13.40 %    Eosinophils 0.10 0.00 - 6.90 %    Basophils  0.10 0.00 - 1.80 %    Immature Granulocytes 0.70 0.00 - 0.90 %    Nucleated RBC 4.10 /100 WBC    Neutrophils (Absolute) 5.87 2.00 - 7.15 K/uL    Lymphs (Absolute) 1.00 1.00 - 4.80 K/uL    Monos (Absolute) 0.68 0.00 - 0.85 K/uL    Eos (Absolute) 0.01 0.00 - 0.51 K/uL    Baso (Absolute) 0.01 0.00 - 0.12 K/uL    Immature Granulocytes (abs) 0.05 0.00 - 0.11 K/uL    NRBC (Absolute) 0.31 K/uL       Current Facility-Administered Medications   Medication Frequency   • [MAR Hold - Suspended Admission] omeprazole (PRILOSEC) capsule 20 mg BID   • [MAR Hold - Suspended Admission] magnesium chloride (MAG-64) delayed-release tablet 64 mg DAILY   • [MAR Hold - Suspended Admission] midodrine (PROAMATINE) tablet 5 mg TID WITH MEALS   • [MAR Hold - Suspended Admission] fludrocortisone (FLORINEF) tablet 0.1 mg BID   • [MAR Hold - Suspended Admission] hydrophilic ointment (AQUAPHOR) OINT BID   • [MAR Hold - Suspended Admission] benzocaine (ANBESOL/ORAJEL) 20 % gel PRN   • [MAR Hold - Suspended Admission] Somatropin SOLR 0.3 mg QHS   • [MAR Hold - Suspended Admission] montelukast (SINGULAIR) tablet 10 mg Q EVENING   • [MAR Hold - Suspended Admission] furosemide (LASIX) tablet 40 mg Q DAY   • [MAR Hold - Suspended Admission] potassium chloride SA (Kdur) tablet 40 mEq DAILY   • [MAR Hold - Suspended Admission] senna-docusate (PERICOLACE or SENOKOT S) 8.6-50 MG per tablet 2 tablet BID PRN   • [MAR Hold - Suspended Admission] Eravacycline Dihydrochloride (XERAVA) 100 mg in  mL IVPB Q12HRS   • [MAR Hold - Suspended Admission] polyethylene glycol/lytes (MIRALAX) PACKET 1 Packet QDAY PRN   • [MAR Hold - Suspended Admission] magnesium hydroxide (MILK OF MAGNESIA) suspension 30 mL QDAY PRN   • [MAR Hold - Suspended Admission] bisacodyl (DULCOLAX) suppository 10 mg QDAY PRN   • [MAR Hold - Suspended Admission] ondansetron (ZOFRAN ODT) dispertab 4 mg 4X/DAY PRN   • [MAR Hold - Suspended Admission] ondansetron (ZOFRAN) syringe/vial injection  4 mg 4X/DAY PRN   • [MAR Hold - Suspended Admission] hydrocortisone (CORTEF) tablet 10 mg DAILY   • [MAR Hold - Suspended Admission] hydrocortisone (CORTEF) tablet 20 mg DAILY   • [MAR Hold - Suspended Admission] vitamin D (cholecalciferol) tablet 2,000 Units DAILY   • [MAR Hold - Suspended Admission] tizanidine (ZANAFLEX) tablet 4 mg QDAY PRN   • [MAR Hold - Suspended Admission] SUMAtriptan (IMITREX) tablet 50 mg Q2HRS PRN   • [MAR Hold - Suspended Admission] liothyronine (CYTOMEL) tablet 25 mcg QHS   • [MAR Hold - Suspended Admission] levothyroxine (SYNTHROID) tablet 75 mcg AM ES   • [MAR Hold - Suspended Admission] gabapentin (NEURONTIN) capsule 400 mg BID   • [MAR Hold - Suspended Admission] DULoxetine (CYMBALTA) capsule 60 mg QHS   • [MAR Hold - Suspended Admission] colesevelam (WELCHOL) tablet 625 mg TID   • [MAR Hold - Suspended Admission] calcitonin (salmon) (MIACALCIN) nasal spray 200 Units QHS   • [MAR Hold - Suspended Admission] apixaban (ELIQUIS) tablet 5 mg BID   • [MAR Hold - Suspended Admission] amitriptyline (ELAVIL) tablet 10 mg DAILY   • [MAR Hold - Suspended Admission] sodium chloride (OCEAN) 0.65 % nasal spray 2 Spray PRN   • [MAR Hold - Suspended Admission] traZODone (DESYREL) tablet 50 mg QHS PRN   • [MAR Hold - Suspended Admission] mag hydrox-al hydrox-simeth (MAALOX PLUS ES or MYLANTA DS) suspension 20 mL Q2HRS PRN   • [MAR Hold - Suspended Admission] benzocaine-menthol (CEPACOL) lozenge 1 Lozenge Q2HRS PRN   • [MAR Hold - Suspended Admission] artificial tears ophthalmic solution 1 Drop PRN   • [MAR Hold - Suspended Admission] acetaminophen (Tylenol) tablet 650 mg Q4HRS PRN   • [MAR Hold - Suspended Admission] hydrALAZINE (APRESOLINE) tablet 25 mg Q8HRS PRN   • [MAR Hold - Suspended Admission] lidocaine (LIDODERM) 5 % 1 Patch DAILY     No current outpatient medications on file.     Facility-Administered Medications Ordered in Other Encounters   Medication Frequency   • ondansetron (ZOFRAN)  syringe/vial injection 4 mg Q4HRS PRN   • ondansetron (ZOFRAN ODT) dispertab 4 mg Q4HRS PRN   • promethazine (PHENERGAN) tablet 12.5-25 mg Q4HRS PRN   • promethazine (PHENERGAN) suppository 12.5-25 mg Q4HRS PRN   • prochlorperazine (COMPAZINE) injection 5-10 mg Q4HRS PRN   • nystatin (MYCOSTATIN) 786716 UNIT/ML suspension 500,000 Units 4X/DAY   • hydrocortisone sodium succinate PF (Solu-CORTEF) 100 MG injection 100 mg Q8HRS   • amitriptyline (ELAVIL) tablet 10 mg DAILY   • calcitonin (salmon) (MIACALCIN) nasal spray 200 Units QHS   • Cholecalciferol TABS 2,000 Units DAILY   • colesevelam (WELCHOL) tablet 625 mg TID   • DULoxetine (CYMBALTA) capsule 60 mg QHS   • gabapentin (NEURONTIN) capsule 400 mg BID   • levothyroxine (SYNTHROID) tablet 75 mcg AM ES   • liothyronine (CYTOMEL) tablet 25 mcg QHS   • midodrine (PROAMATINE) tablet 5 mg TID WITH MEALS   • Somatropin SOLR 0.3 mg Q EVENING   • SUMAtriptan (IMITREX) tablet 50 mg Q2HRS PRN   • Eravacycline Dihydrochloride SOLR 100 mg BID       Orders Placed This Encounter   Procedures   • Diet Order Diet: Regular     Standing Status:   Standing     Number of Occurrences:   1     Order Specific Question:   Diet:     Answer:   Regular [1]       Assessment:  Active Hospital Problems    Diagnosis    • Syncope    • Transaminitis    • High potassium    • History of pulmonary embolism    • Adrenal insufficiency (Reno's disease) (HCC)    • Hypertension    • Tachycardia    • Orthostatic hypotension    • Hypothyroidism    • Chronic systolic heart failure (HCC)    • Gastroesophageal reflux disease without esophagitis    • Depression        Medical Decision Making and Plan:  Orthostatic hypotension - Probably a combination of volume depletion and underlying Reno's disease, was started on Florinef at [facility] as well as SILVERIO hoses and abdominal binder  -PT and OT for mobility and ADLs  -Continue SILVERIO hoses and abdominal binder  -On Florinef 0.1 mg daily. Has CHF so needs to be on ARB  and BB at lowest dose. Consider midodrine.   -Consult hospitalist. Syncope on day of evaluation while in shower 6/29  -Increase Florinef to BID, early AM and noon.      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 Now on Florinef. Will monitor electrolytes.   -Consult Hospitalist, considering increase in Hydrocortisone level. Currently 20 mg qAM and 10 mg q 1PM. Also changed Florinef to earlier morning dose  -Patient to bring in home medications prescribed by Endocrinologist     New bleeding - patient with very low platelets and now with epistaxis and BRBPR. Discussed with hospitalist and transfer to ED.      Incontinence - Patient with bowel and bladder incontinence which I typically do not associate with autonomic dysfunction but was present in literature. Removed bowel medications and any laxatives. She is on Lasix.  Will need follow-up. She has not had any spinal imaging (weakness, orthostatic hypotension, incontinence) which might be a consideration as outpatient.      CHF - Patient on Coreg 3.125 mg and Losartan 25 mg daily  -Coreg held for repeat syncopal episodes.      Neuropathy - Patient on Gabapentin 400 mg BID     HLD - Patient on welchol.      CKD - Avoid nephrotoxic agents. Check AM CMP 0.61, stable     Anemia - Check AM CBC - resolved     Mood/Depression/Pain - Patient is on Cymbalta 60 mg and Amitriptyline 10 mg daily.      Elevated LFTs - Elevated on admission into 200s. Will monitor, was previously downtrending.   -Into 100s on repeat, will monitor    Asthma/COPD - Patient on Singulair on transfer.      Hypothyroidism - Patient on Levothyroxine 75 mcg and Cytomel 25 mcg QHS. Low TSH on admission, normal T4    MRSA sinusitis - Prolonged treatment, with multiple allergies to antibiotics. On Eravacycline 100 mg BID. Working with pharmacy on prescription of medication  -Nasal wound culture     Morbid Obesity due to excess calories - BMI of 37.84 on admission, meets medical criteria.  Dietitian to follow     GERD -Patient on omeprazole on transfer.      Hx of PE - Patient on Eliquis    Total time:  36 minutes.  I spent greater than 50% of the time for patient care, counseling, and coordination on this date, including unit/floor time, and face-to-face time with the patient as per interval events and assessment and plan above. Topics discussed included worsening thrombocytopenia, new bleeding, hypotension, and transfer to ED.     Darion Metzger M.D.

## 2021-07-10 NOTE — CARE PLAN
Problem: Nutritional:  Goal: Achieve adequate nutritional intake  Description: Patient will consume >50% of meals  Outcome: Not Met   0% consumed of breakfast tray. RD following.

## 2021-07-10 NOTE — PROGRESS NOTES
Patients new lactic resulted 8.0, Dr. Tolbert notified. Dr. Tolbert states no concern for infection despite active orders for Vancomycin, and Merrem. Will continue to trend lactic.

## 2021-07-10 NOTE — THERAPY
Occupational Therapy   Initial Evaluation     Patient Name: Donna Isaac  Age:  56 y.o., Sex:  female  Medical Record #: [id number]  Today's Date: 7/10/2021     Precautions  Precautions: (P) Fall Risk  Comments: (P) syncope with sit EOB    Assessment  Patient is 56 y.o. female with a diagnosis of GI bleed.  Additional factors influencing patient status / progress: good family support at home. Will hold OT intervention until more medically stable, and able to fully participate.      Plan    Recommend Occupational Therapy for Evaluation only for the following treatments:  NA.             Subjective    Pleasant, cooperative, motivated     Objective       07/10/21 0935   Total Time Spent   Total Time Spent (Mins) 40   Charge Group   OT Evaluation OT Evaluation Mod   Initial Contact Note    Initial Contact Note Order Received and Verified, Evaluation Only - Patient Does Not Require Further Acute Occupational Therapy at this Time.  However, May Benefit from Post Acute Therapy for Higher Level Functional Deficits.  (not medically stable for continued therapies)   Prior Living Situation   Prior Services Intermittent Physical Support for ADL Per Family   Housing / Facility 1 Story House  (moving to a new, one story home)   Steps Into Home 1   Elevator No   Bathroom Set up Walk In Shower;Grab Bars;Shower Chair   Equipment Owned Unable to Determine At This Time   Lives with - Patient's Self Care Capacity Spouse   Comments is currently at acute rehab, plan is for eventual DC home with spouse, to one story home   Prior Level of ADL Function   Self Feeding Independent   Grooming / Hygiene Independent   Bathing Independent   Dressing Independent   Toileting Independent   Comments prior to initial admit to [facility]   Prior Level of IADL Function   Medication Management Independent   Laundry Requires Assist   Kitchen Mobility Independent   Finances Requires Assist   Home Management Requires Assist   Shopping Requires Assist    Prior Level Of Mobility Independent Without Device in Home   Driving / Transportation Relatives / Others Provide Transportation   History of Falls   History of Falls Yes   Date of Last Fall   (frequent syncope)   Precautions   Precautions Fall Risk   Comments syncope with sit EOB   Vitals   O2 Delivery Device None - Room Air   Pain 0 - 10 Group   Therapist Pain Assessment During Activity;Post Activity Pain Same as Prior to Activity;Nurse Notified  (did not rate)   Cognition    Speech/ Communication Delayed Responses   Level of Consciousness Alert   Attention Impaired   Passive ROM Upper Body   Passive ROM Upper Body WDL   Active ROM Upper Body   Active ROM Upper Body  WDL   Dominant Hand Right   Strength Upper Body   Gross Strength Generalized Weakness, Equal Bilaterally.    Sensation Upper Body   Upper Extremity Sensation  WDL   Upper Body Muscle Tone   Upper Body Muscle Tone  WDL   Coordination Upper Body   Coordination WDL   Balance Assessment   Sitting Balance (Static) Fair   Sitting Balance (Dynamic) Fair   Weight Shift Sitting Good   Comments unable to tolrate sit, or stand, syncopal episode with transition from supine to EOB   Bed Mobility    Comments participating with min assist prior to syncope   ADL Assessment   Eating Supervision   Grooming Supervision;Seated   How much help from another person does the patient currently need...   Putting on and taking off regular lower body clothing? 2   Bathing (including washing, rinsing, and drying)? 2   Toileting, which includes using a toilet, bedpan, or urinal? 2   Putting on and taking off regular upper body clothing? 2   Taking care of personal grooming such as brushing teeth? 3   Eating meals? 3   6 Clicks Daily Activity Score 14   Functional Mobility   Sit to Stand Unable to Participate   Bed, Chair, Wheelchair Transfer Unable to Participate   Toilet Transfers Unable to Participate   Visual Perception   Visual Perception  WDL   Activity Tolerance   Comments  did not tolerate   Education Group   Role of Occupational Therapist Patient Response Patient;Significant Other;Acceptance;Explanation;Demonstration;Verbal Demonstration   Interdisciplinary Plan of Care Collaboration   IDT Collaboration with  Nursing;Physician;Physical Therapist   Patient Position at End of Therapy In Bed;Call Light within Reach;Tray Table within Reach;Phone within Reach;Family / Friend in Room  (MDs rounding)   Collaboration Comments RN OKed session, did not tolerate, discussed with MD who recommedns hold of therapies until medically more stable   Session Information   Date / Session Number  7/10,1/1   Priority 0

## 2021-07-10 NOTE — CARE PLAN
The patient is Unstable - High likelihood or risk of patient condition declining or worsening     Monitor Lactic and Potassium levels. Monitor syncopal episodes.       Problem: Knowledge Deficit - Standard  Goal: Patient and family/care givers will demonstrate understanding of plan of care, disease process/condition, diagnostic tests and medications  Outcome: Progressing  Note: Patient understand that she is in the hospital and the test and labs being done to understand her disease process more     Problem: Fall Risk  Goal: Patient will remain free from falls  Outcome: Progressing  Note: Bed locked and in lowest position. Bed alarm on. Treaded socks provided. Call light and belongings within reach.  Patient educated to call for assistance. Pt verbalized understanding. Hourly rounding in place.

## 2021-07-10 NOTE — PROGRESS NOTES
Patients potassium resulted at 5.9, notified Dr. Tolbert, new orders for insulin and D50 administer. Patient resting comfortable in bed.

## 2021-07-10 NOTE — CODE DOCUMENTATION
Dr. Noble stated he discussed transferring patient to ICU with Dr. Beach. Dr. Beach does not feel this patient needs ICU level of care at this time. ICU MD's suggestion was to trend the lactic acid levels and continue to monitor for now.

## 2021-07-10 NOTE — PROGRESS NOTES
Pharmacy Vancomycin Kinetics Note for 7/9/2021     56 y.o. female on Vancomycin day # 1     Vancomycin Indication (AUC Dosing):  (Sinusitis)  Vancomycin Indication (Two level/Trough based Dosing):      Provider specified end date:  (TBD)    Active Antibiotics (From admission, onward)    Ordered     Ordering Provider       Fri Jul 9, 2021  5:28 PM    07/09/21 1728  MD Alert...Vancomycin per Pharmacy  PHARMACY TO DOSE     Question:  Indication(s) for vancomycin?  Answer:  Other (comments)  Comment:  sinusitis    Ezequiel Quinonez M.D.    07/09/21 1728  meropenem (MERREM) 0.5 g in  mL IVPB  EVERY 6 HOURS     Question Answer Comment   Indication Other     Please specify: sinsuitis        Ezequiel Quinonez M.D.          Dosing Weight: 100 kg (220 lb 7.4 oz)      Admission History: Admitted on 7/9/2021 for GI bleed [K92.2]  Pertinent history: Admitted with thrombcytopenia and bloody stools.  Has been on eravacycline per ID for MRSA sinsusitis.  Also has a history of Gram negative sinusitis which was treated with meropenem.  Current sinus culture from 7/7 is growing a Gram negative domingo.    Allergies:     Ancef [cefazolin], Bactrim [sulfamethoxazole w-trimethoprim], Bee venom, Buprenorphine, Clindamycin, Contrast media with iodine [iodine], Doxycycline, Econazole, Flagyl  [metronidazole], Floxin [ofloxacin], Gadolinium derivatives, Hydrocodone-acetaminophen, Iodine, Keflex, Levofloxacin, Morphine, Naloxone, Nitrofurantoin, Norco [apap-fd&c yellow #10 [place]-hydrocodone], Nyquil, Oxycodone, Paricalcitol, Penicillins, Tape, Tramadol, Vicks dayquil cold, Azithromycin, Bextra [valdecoxib], Linezolid, Codeine, Sulfa drugs, and Tygacil [tigecycline]     Pertinent cultures to date:     Results     Procedure Component Value Units Date/Time    MRSA By PCR (Amp) [[id number]]     Order Status: No result Specimen: Respirate from Nares     BLOOD CULTURE [[id number]]     Order Status: Sent Specimen: Blood from Peripheral     BLOOD  "CULTURE [[id number]]     Order Status: Sent Specimen: Blood from Peripheral     URINALYSIS [[id number]]     Order Status: Sent Specimen: Urine, Clean Catch     URINALYSIS (UA) [[id number]]     Order Status: Sent Specimen: Urine           Labs:     Estimated Creatinine Clearance: 91.5 mL/min (by C-G formula based on SCr of 0.78 mg/dL).  Recent Labs     21  0845 21  0550 21  0600 21  1116 21  1510   WBC 6.5 4.7* 6.2 6.4 7.6   NEUTSPOLYS  --  58.20  --  68.90 77.10*     Recent Labs     21  0538 21  0550 21  1116   BUN 8 10 11   CREATININE 0.39* 0.53 0.78   ALBUMIN  --  2.0* 2.0*       Intake/Output Summary (Last 24 hours) at 2021 1735  Last data filed at 2021 1329  Gross per 24 hour   Intake 560 ml   Output --   Net 560 ml      /79   Pulse 95   Temp 36.8 °C (98.2 °F) (Temporal)   Resp 14   Ht 1.626 m (5' 4.02\")   Wt 100 kg (220 lb 7.4 oz)   SpO2 96%  Temp (24hrs), Av.4 °C (97.6 °F), Min:36.1 °C (96.9 °F), Max:36.8 °C (98.2 °F)      List concerns for Vancomycin clearance:     Abnormal LFTs;Malnutrition/Low albumin;Obesity    Pharmacokinetics:   AUC kinetics:   Ke (hr ^-1): 0.0808 hr^-1  Half life: 8.58 hr  Clearance: 4.908  Estimated TDD: 2454  Estimated Dose: 1084  Estimated interval: 10.6    A/P:     -  Vancomycin dose: 2500 mg IV x 1 then 2000 mg IV q24h    -  Next vancomycin level(s):    -  @ 2200   - Vt @ 1800     -  Predicted vancomycin AUC from initial AUC test calculator: 407 mg·hr/L    Jolene Davies, PharmD  "

## 2021-07-10 NOTE — PROGRESS NOTES
Received report from day shift RNSebastian. Assumed care of pt @ 1915. Pt reports no needs at this time. Updated pt on plan of care. Pt resting comfortably in bed. Educated on use of call light. Hourly rounding and continuous monitoring in place.

## 2021-07-10 NOTE — CODE DOCUMENTATION
Pt had another syncopal episode lasting about 30 seconds while RRT at bedside. Same precursor symptoms of chills and tremors leading up to episode. Dr. Rose called to bedside during event. Vitals stable, no changes in heart rate per monitor room. Repeat FSBS 123

## 2021-07-10 NOTE — CONSULTS
DATE OF SERVICE:  07/10/2021     INPATIENT HEMATOLOGY CONSULTATION     REASON FOR CONSULTATION:  Thrombocytopenia.     HISTORY OF PRESENT ILLNESS:  The patient is a very nice 56-year-old woman with   a complex medical history including hypertension, hyperlipidemia, CAD, CHF,   hypothyroidism, hemochromatosis, Valentín's disease, adrenal insufficiency,   TBI, seizure disorder, GERD, asthma, fibromyalgia, migraines, osteoporosis,   diverticulosis associated with GI bleeding, and a history of pulmonary   embolism, who was admitted because of recurrent syncope, and some report of   bloody stools.  She was found to have thrombocytopenia, and we have been asked   to consult in the case.     The patient has been living at a rehabilitation center here for the last week   and a half.  This is related to poor nutritional status and weakness as well   as a leg injury.  At the rehab center, she was found to have bloody stools.    She was having episodes of repeated syncope.  Because of this, she was brought   to the emergency room on 07/09.     In the emergency room, her CBC was notable for a platelet count of 23.  She   had stool testing that was positive for occult blood, but she had no gross   blood.  She also described having some back pain as well as some chronic   ongoing diarrhea, which she has had for 4 years with some occasional bowel and   bladder incontinence.  She underwent a CT scan of the abdomen and pelvis that   showed a fatty liver, but no splenomegaly.  She had some anasarca.  There   were no other acute changes.  She was transfused one unit of platelets and her   platelets came up to 72.     She was admitted for further care.  She had a urinalysis that was positive for   nitrite and leukocyte esterase, and worrisome for urinary tract infection.    It should be noted that she has a history of chronic MRSA associated   sinusitis, and she follows with Dr. Butler of ENT locally, as well as [facility]   [facility].  She  also follows at [facility] and sees Dr. Afshin Storm.  Together they have her on Xerava IV antibiotic, which she gets once a   day through a port.     Because of her back pain, she underwent a CT scan of the thoracic and lumbar   spine.  This was negative for any acute changes and she had some chronic   degenerative changes.  She had a chest x-ray that was negative.  She has been   afebrile, but her lactic acid is elevated around 8.  She also has a   procalcitonin that is elevated at 8.81.  Because of all of these findings, she   was put on to meropenem and vancomycin.     In terms of her platelets, looking back in the record, her platelets have   typically been normal.  Back on 06/25, they were 305.  By 06/29, they were   down to 165.  By 07/07, they were down to 30 and on admission, they were 23.    This has been somewhat progressive recently just over the past couple of   weeks.  She denies any new medication changes over the last couple of weeks.    Her chronic IV antibiotic therapy has been present for 2 months, so this is   not new.  We have been asked to consult in the case.     PAST MEDICAL HISTORY:    1.  Hypertension.  2.  Hypothyroidism.  3.  Hyperlipidemia.  4.  CAD.  5.  CHF.  6.  GERD.  7.  Asthma.  8.  Fibromyalgia.  9.  Migraines.  10.  Osteoporosis.  11.  Seizure disorder.  12.  TBI.  13.  Adrenal insufficiency.  14.  Hayes's disease.  15.  History of PE in 04/2021.  16.  Hemochromatosis.  17.  Diverticulosis -- associated with GI bleed one to two times per year.     PAST SURGICAL HISTORY:    1.  Hysterectomy.  2.  Gastric bypass surgery.  3.  Abdominal exploration.  4.  Sinus cyst surgery.  5.  Mandible fracture ORIF.  6.  Right foot surgery.  7.  Right ankle ORIF.  8.  Appendectomy.  9.  Left clavicle excision.  10.  Left shoulder surgery.     ALLERGIES:  ANCEF, BACTRIM/SULFA, BUPRENORPHINE, CLINDAMYCIN, IV CONTRAST,   DOXYCYCLINE, ECONAZOLE, FLAGYL, TYGACIL, FLOXACIN, LEVAQUIN,  GADOLINIUM,   HYDROCODONE, MORPHINE, CODEINE, OXYCODONE, TRAMADOL, NITROFURANTOIN, KEFLEX,   NYQUIL, PARICALCITOL, PENICILLIN, TAPE, AZITHROMYCIN, BEXTRA, LINEZOLID.     FAMILY HISTORY:  She has no family history of cancers.     SOCIAL HISTORY:  She was born in [place].  She lives here locally, but has   been living in a rehab for the past one week.  She is  to her ,   Colin.  She has 3 children.  She is a retired .  She is a   lifelong nonsmoker.  She rarely drinks alcohol.  She does not use any IV   drugs, marijuana or illicit drugs.     REVIEW OF SYSTEMS:  The review of systems is positive for longstanding history   of migraines, which had been very active lately.  She has had thrush and has   some dysphagia.  She has had chronic diarrhea for 4 years.  She has blood in   the stools as described above.  She has some abdominal pain off and on.  She   has chronic sinusitis over the last year.  She has shortness of breath with   exertion, but denies a cough.  She has some edema in the legs.  She has some   numbness in the right hand.  She has some chronic low back pain.  Otherwise, a   complete review of systems per the AMA and CMS criteria is negative.     PHYSICAL EXAMINATION:    VITAL SIGNS:  Her height is 5 feet 4 inches, her weight is 220 pounds, her   T-max is 99.5, pulse of 85, blood pressure 145/98, respirations 17, satting   95% on room air.  GENERAL:  No acute distress, chronically ill-appearing woman, very weak and   fatigued.  ECOG equals 3.  HEENT:  NCAT.  Sclerae are anicteric.  Conjunctivae clear.  Her oropharynx   shows some mild erythema diffusely.  NECK:  Supple, nontender, no elevated JVP, no carotid bruits, no thyromegaly.  CHEST:  Clear to auscultation and percussion bilaterally.  No wheeze, rales,   rhonchi.  Decreased breath sounds at the bases.  CARDIOVASCULAR:  Regular rate and rhythm.  No murmurs, gallops or rubs.    Normal S1, S2.  ABDOMEN:  Soft, obese.   Mildly tender.  No appreciable organomegaly or masses.    Normoactive bowel sounds.  LYMPH NODES:  No cervical, supraclavicular, infraclavicular, axillary or   inguinal lymphadenopathy.  EXTREMITIES:  No cyanosis, clubbing.  She has 2+ bilateral lower extremity   edema.  SKIN:  She has scattered rashes and some excoriated lesions.  No nonhealing   wounds.  NEUROLOGIC:  Her cranial nerves II-XII are intact.  She has intact sensation   to light touch throughout.  She moves all 4 extremities appropriately.  PSYCHIATRIC:  She is alert and oriented x3.  She has normal mood and affect.     LABORATORY DATA:  White blood count 8.8, hemoglobin 11.4, hematocrit 34.3%,   platelets 65, MCV 89.  Reticulocyte count 2.4 with 72% neutrophils, 15%   lymphocytes, 10% monocytes, 2% eosinophils, 2% basophils.     Sodium 132, potassium 5.7, chloride 95, CO2 of 21, BUN 13, creatinine 0.97,   glucose 112, calcium 7.2, AST 95, ALT 79, alkaline phosphatase 322, bilirubin   7.9, albumin 2.0, protein 3.5.     Ferritin 136, serum iron 113, TIBC 130, iron sat 87%.  INR 2.5, PT 26.1, PTT   52, B12 of 379.  Hepatitis B and hepatitis C antibodies are negative.     ASSESSMENT AND PLAN:  The patient is a very nice 56-year-old woman with the   multiple medical history as listed above, who was admitted now because of some   episodes of blood in the stool and syncope and was found to have   thrombocytopenia.  She has chronic resistant infectious sinusitis and follows   with an ENT and infectious disease service locally as well as at [place].  We   have been asked to consult in the case because of her thrombocytopenia.     She came in thrombocytopenic, and this seems to be a new change over the last   one to two weeks.  She has not had any new medication   changes.  She did receive a platelet transfusion, and she responded   appropriately.  This would be inconsistent with ITP or an autoimmune   thrombocytopenia.     At this point, I do not think she  has ITP.  I do not find any findings that   would be concerning for TTP or HUS or another TMA process.  I will check a   fibrinogen to rule out things like DIC.  She does have some ongoing infection   as indicated by her history as well as some of the lab work. With severe DIC,   we could see thrombocytopenia.     She could have some component of consumption of platelets related to her   history of GI bleeding.  It seems maybe somewhat excessive, but could be a   possible underlying contributor, but maybe not the main cause.     Sometimes we have to think about medications.  She denies being on any new   medications.  She has been on an IV antibiotic for the past 2 months.  Perhaps   this is just now contributing.  This is something we will have to watch.     She does not have a B12 deficiency.  We cannot check folate while she is an   inpatient, but she certainly is nutritionally compromised and that could be   contributing.  I will check an LDH.  I will check an KINZA, rheumatoid factor,   ESR, and CRP.     Her case is somewhat curious in terms of her bilirubin as well.  Her bilirubin   has been changing.  It previously was normal back on 06/23 at 0.5 and has   been gradually going up.  It is now up to 7.9.  The CT scan of the abdomen did   not show any acute abnormalities.  I think this would require further workup.    The medicine team can check a direct bilirubin and see what is indirect and   in direct.  They may need to do an ultrasound of the upper abdomen.  She may   need to have a GI consult and further workup.     In terms of her chronic infection, she has been seen by [facility].  She follows with Dr. Afshin Storm.  We can always get them involved   and see if they think any of this could be related to her antibiotics or   underlying infection.  With the Xerava, there are not any major reports of   thrombocytopenia.  We can maybe see some decreased white blood cells.  There   can be  hepatic toxicity, and I wonder if that could be contributing to some of   her underlying liver changes.     Sometimes we can see thrombocytopenia in the setting of underlying liver   disease, but this is usually accompanied with hepatosplenomegaly.  Her CT scan   does not show any splenomegaly, so this does not seem to be an active issue.     Sometimes we can see low blood counts in the setting of severe infection.    Perhaps this is all sepsis related.  Perhaps this will come up as her   infection is brought under control.  This is something we can monitor as well.     If her counts do not recover, she could be a candidate for something like a   bone marrow biopsy in the future.  For now, I would wait and see what her   counts do over time.  There does seem to be some acute changes that happen   particularly with her liver enzymes including bilirubin and alkaline   phosphatase as well as her AST and ALT.  We will continue to monitor.     Thank you very much for this referral of this nice woman.        ______________________________  MD SAMANTHA Zhang/KANE/SANDRA    DD:  07/10/2021 10:33  DT:  07/10/2021 12:10    Job#:  [id number]

## 2021-07-10 NOTE — PROGRESS NOTES
This RN went to ED to  patient and was informed of her recent lactic acid of 6.5. This RN notified charge nurse. Per admitting physician and charge RN, ok to admit to tele since patient is not septic. No new orders for elevated lactic at this time. Will continue to monitor. Charge RN aware and updated.

## 2021-07-10 NOTE — CARE PLAN
Problem: Pain - Standard  Goal: Alleviation of pain or a reduction in pain to the patient’s comfort goal  Outcome: Progressing     Problem: Knowledge Deficit - Standard  Goal: Patient and family/care givers will demonstrate understanding of plan of care, disease process/condition, diagnostic tests and medications  Outcome: Progressing  Note: Pt verbalizes understanding   The patient is Watcher - Medium risk of patient condition declining or worsening

## 2021-07-10 NOTE — PROGRESS NOTES
4 Eyes Skin Assessment Completed by AILYN Cortes and AILYN Pleitez.    Head Jaundice  Ears Jaundice  Nose Jaundice  Mouth Ulcer(s), thrush  Neck Jaundice  Breast/Chest Bruising, Jaundice and Weeping, Edema  Shoulder Blades WDL  Spine WDL  (R) Arm/Elbow/Hand Bruising, Scab, Swelling, Weeping, Shiny and Edema, fingers have cuts  (L) Arm/Elbow/Hand Bruising, Swelling, Weeping, Shiny and Edema, scab on right wrist now weeping, fingers have cuts  Abdomen Jaundice, edema  Groin Redness, Blanching, Excoriation, Rash and Swelling  Scrotum/Coccyx/Buttocks Redness and Blanching  (R) Leg Swelling, Jaundice, Shiny, Weeping and Edema  (L) Leg Swelling, Jaundice, Shiny, Weeping and Edema  (R) Heel/Foot/Toe Redness, Blanching and Jaundice, right ankle scab  (L) Heel/Foot/Toe Redness, Blanching and Jaundice          Devices In Places Tele Box, Pulse Ox and SILVERIO's, purewick      Interventions In Place Pillows, Q2 Turns, Barrier Cream and Pressure Redistribution Mattress    Possible Skin Injury Yes    Pictures Uploaded Into Epic Yes  Wound Consult Placed Yes  RN Wound Prevention Protocol Ordered Yes

## 2021-07-10 NOTE — WOUND TEAM
[facility]  Inpatient Services  Initial Wound and Skin Care Evaluation    Admission Date: 7/9/2021     Last order of IP CONSULT TO WOUND CARE was found on 7/9/2021 from Hospital Encounter on 7/9/2021     HPI, PMH, SH: Reviewed    Past Surgical History:   Procedure Laterality Date   • HARDWARE REMOVAL ORTHO Right 3/17/2021    Procedure: REMOVAL, HARDWARE - SYNDESMOTIC SCREW OF ANKLE.;  Surgeon: William Srinivasan M.D.;  Location: SURGERY [facility];  Service: Orthopedics   • ANKLE ORIF Right 1/27/2021    Procedure: ORIF, ANKLE;  Surgeon: William Srinivasan M.D.;  Location: SURGERY [facility];  Service: Orthopedics   • ESOPHAGEAL MOTILITY OR MANOMETRY N/A 8/19/2020    Procedure: MOTILITY STUDY, ESOPHAGUS, USING MANOMETRY;  Surgeon: Jamel Oden M.D.;  Location: ENDOSCOPY [facility];  Service: Gastroenterology   • PB FUSION FOOT BONES,TRIPLE Right 7/14/2020    Procedure: FUSION, JOINT, HINDFOOT, TRIPLE - WITH POSSIBLE GASTROC RECESSION;  Surgeon: Wm Librado Mercedes M.D.;  Location: SURGERY [facility];  Service: Orthopedics   • CYST EXCISION  05/2020    right frontal tempral sinus   • SHOULDER DECOMPRESSION ARTHROSCOPIC Left 2/19/2019    Procedure: SHOULDER DECOMPRESSION ARTHROSCOPIC - SUBACROMIAL;  Surgeon: Camila Elkins M.D.;  Location: [facility];  Service: Orthopedics   • CLAVICLE DISTAL EXCISION Left 2/19/2019    Procedure: CLAVICLE DISTAL EXCISION;  Surgeon: Camila Elkins M.D.;  Location: [facility];  Service: Orthopedics   • SHOULDER ARTHROSCOPY W/ BICIPITAL TENODESIS REPAIR Left 2/19/2019    Procedure: SHOULDER ARTHROSCOPY W/ BICIPITAL TENODESIS REPAIR;  Surgeon: Camila Elkins M.D.;  Location: [facility];  Service: Orthopedics   • GASTROSCOPY N/A 2/7/2019    Procedure: GASTROSCOPY- DIALATION AND BIOPSY;  Surgeon: Hola Veliz M.D.;  Location: SURGERY [facility];  Service: Gastroenterology   • ABDOMINAL  EXPLORATION  2002   • GASTRIC BYPASS LAPAROSCOPIC  1999   • HYSTERECTOMY, TOTAL ABDOMINAL  1995   • MANDIBLE FRACTURE ORIF  1983   • APPENDECTOMY  1974   • GYN SURGERY     • OTHER ORTHOPEDIC SURGERY       Social History     Tobacco Use   • Smoking status: Never Smoker   • Smokeless tobacco: Never Used   Substance Use Topics   • Alcohol use: Yes     Chief Complaint   Patient presents with   • Bloody Stools     BIB EMS from rehab facility for bloody stools. Most recent CBC resulted platelet count of 18. Pt had episode of syncope at 1105 that lasted approximately 20 sec. Pt states LOC is common for her.   • Loss of Consciousness     Pt had episode of syncope at 1105 that lasted approximately 20 sec. Pt states LOC is common for her.     Diagnosis: GI bleed [K92.2]    Unit where seen by Wound Team: T803/02     WOUND CONSULT/FOLLOW UP RELATED TO:  Groin, vagina, fingers, and bilateral forearms     WOUND HISTORY:  Patient admitted for bloody stools and thrombocytopenia. With regard to wounds, patient reported that the partial thickness linear wounds to her fingers were due to an allergic reaction to soap and her fingers started to peel. Wounds to her forearms were due to dog scratches and bites, however due to anasarca, wounds are weeping.     WOUND ASSESSMENT/LDA     Wound 06/28/21  Groin;Vagina Bilateral moisture associated dermatitis  (Active)   Wound Image   07/09/21 1651   Site Assessment Red;Pink;Excoriated 07/10/21 1500   Periwound Assessment Cool;Blanchable erythema;Edema;Red;Denuded 07/10/21 1500   Margins Undefined edges 07/10/21 1500   Closure Open to air 07/10/21 1500   Drainage Amount None 07/10/21 1500   Treatments Site care 07/10/21 1500   Wound Cleansing Soap and Water 07/10/21 1500   Periwound Protectant Antifungal Therapy;Barrier Paste 07/10/21 1500   Dressing Cleansing/Solutions Not Applicable 07/10/21 1500   Dressing Options Open to Air 07/10/21 1500   Dressing Changed Reinforced 07/10/21 1500    Dressing Status Open to Air 07/10/21 1500   Dressing Change/Treatment Frequency Every Shift, and As Needed 07/10/21 1500   NEXT Dressing Change/Treatment Date 07/11/21 07/10/21 1500   Exposed Structures None 07/10/21 1500   WOUND NURSE ONLY - Time Spent with Patient (mins) 30 07/10/21 1500       Wound 07/06/21 Partial Thickness Wound Finger, Thumb;Finger, 2nd;Finger, 3rd Bilateral (Active)   Wound Image    07/10/21 1500   Site Assessment Pink;Red;Light Purple 07/10/21 1500   Periwound Assessment Edema 07/10/21 1500   Margins Undefined edges 07/10/21 1500   Closure Open to air 07/10/21 1500   Drainage Amount None 07/10/21 1500   Treatments Site care 07/10/21 1500   Wound Cleansing Approved Wound Cleanser 07/10/21 1500   Periwound Protectant Not Applicable 07/10/21 1500   Dressing Options Open to Air 07/10/21 1500   Dressing Changed Other (Comment) 07/09/21 2100   Dressing Status Open to Air 07/10/21 1500   Non-staged Wound Description Partial thickness 07/10/21 1500   Exposed Structures None 07/10/21 1500   WOUND NURSE ONLY - Time Spent with Patient (mins) 30 07/10/21 1500       Vascular:    TANNER:   No results found.    Lab Values:    Lab Results   Component Value Date/Time    WBC 8.8 07/10/2021 09:13 AM    RBC 3.83 (L) 07/10/2021 09:13 AM    HEMOGLOBIN 10.1 (L) 07/10/2021 11:17 AM    HEMATOCRIT 29.6 (L) 07/10/2021 11:17 AM    CREACTPROT 5.68 (H) 07/10/2021 11:17 AM    SEDRATEWES 6 07/10/2021 11:17 AM    HBA1C 6.0 (H) 06/29/2021 06:06 AM        Culture Results show:  No results found for this or any previous visit (from the past 720 hour(s)).    Pain Level/Medicated:  Denies pain with regard to wound care       INTERVENTIONS BY WOUND TEAM:  Chart and images reviewed. Discussed with bedside RN. All areas of concern (based on picture review, LDA review and discussion with bedside RN) have been thoroughly assessed. Documentation of areas based on significant findings. This RN in to assess patient. Performed standard  wound care which includes appropriate positioning, dressing removal and non-selective debridement. Pictures and measurements obtained weekly if/when required.    BILATERAL FOREARMS -   Preparation for Dressing removal: NA  Cleansed with:  wound cleanser and gauze.  Sharp debridement: NA  Neela wound: Cleansed with wound cleanser and gauze  Primary Dressing: aquacel AG  Secondary (Outer) Dressing: roll gauze    ** will order bacitracin for fingers and keep them open to air **     ** nystatin powder ordered for bilateral groin, in between thighs, and labias, interdry applied to groin  **     Interdisciplinary consultation: Patient, Bedside RN Sebastian    EVALUATION / RATIONALE FOR TREATMENT:  Most Recent Date:  7/10: Patient with anasarca thus shallow wounds to forearms are weeping. Hydrofiber silver and roll gauze applied over scattered small wound beds to manage moisture from drainage without causing further skin breakdown. Patient's bilateral groin and vaginal area edematous and with blanching erythema, likely a combination of increased moisture and yeast which now caused dermatitis to area. Nystatin powder applied and interdry placed for moisture management. Wounds to fingers were cleansed but left open to air, will order bacitracin.      Goals: Steady decrease in wound area and depth weekly.    WOUND TEAM PLAN OF CARE ([X] for frequency of wound follow up,):   Nursing to follow orders written for wound care. Contact wound team if area fails to progress, deteriorates or with any questions/concerns  Dressing changes by wound team:                   Follow up 3 times weekly:                NPWT change 3 times weekly:     Follow up 1-2 times weekly:      Follow up Bi-Monthly:                   Follow up as needed:   X  Other (explain):     NURSING PLAN OF CARE ORDERS (X):  Dressing changes: See Dressing Care orders: X  Skin care: See Skin Care orders: X  RN Prevention Protocol: X  Rectal tube care: See Rectal Tube Care  orders:   Other orders:    RSKIN:   CURRENTLY IN PLACE (X), APPLIED THIS VISIT (A), ORDERED (O):   Q shift Matthew:  X  Q shift pressure point assessments:  X    Surface/Positioning   Pressure redistribution mattress            Low Airloss          Bariatric foam      Bariatric CHEVY     Waffle cushion        Waffle Overlay        X  Reposition q 2 hours    X  TAPs Turning system   O  Z Jack Pillow     Offloading/Redistribution   Sacral Mepilex (Silicone dressing)     Heel Mepilex (Silicone dressing)         Heel float boots (Prevalon boot)             Float Heels off Bed with Pillows           Respiratory   Silicone O2 tubing       O  Gray Foam Ear protectors   O  Cannula fixation Device (Tender )          High flow offloading Clip    Elastic head band offloading device      Anchorfast                                                         Trach with Optifoam split foam             Containment/Moisture Prevention   Rectal tube or BMS    Purwick/Condom Cath        Perez Catheter    Barrier wipes           Barrier paste     X  Antifungal tx    X  Interdry    X    Mobilization DONNA  Up to chair        Ambulate      PT/OT      Nutrition DONNA     Dietician        Diabetes Education      PO     TF     TPN     NPO   # days     Other        Anticipated discharge plans: TBD   LTACH:        SNF/Rehab:                  Home Health Care:           Outpatient Wound Center:            Self/Family Care:        Other:

## 2021-07-10 NOTE — THERAPY
"Physical Therapy   Initial Evaluation     Patient Name: Donna Isaac  Age:  56 y.o., Sex:  female  Medical Record #: [id number]  Today's Date: 7/10/2021     Precautions: (P) Fall Risk    Assessment  Patient is 56 y.o. female with a diagnosis of lower GI bleed, thrombocytopenia, thrush.  Other PMH includes [place]'s disease, Hx PE April 2021, CHF, hx TBI, Hx MI, hypothyroidism, osteoporosis, arthritis, asthma. Pt with recent hospitalization, and had been at inpatient rehab when readmitted. She reports she is moving to a new home with spouse when she leaves the hospital; single level home with 1 step to enter. She was ambulating with intermittent use of FWW at baseline. Today, pt was not able to tolerate sitting EOB. She had episode of syncope with transfer to sitting. She was assisted back to supine and roused. MD arrived for rounds at this point, and PT/OT left pt in care of MD. Pt is not appropriate for continued PT POC at this time, and will be discharged until appropriate for participation. Please re-order PT when pt able to participate.    Plan    Recommend Physical Therapy for Evaluation only     DC Equipment Recommendations: (P) Unable to determine at this time  Discharge Recommendations: (P) Other - (not safe for D/C; requires further medical management)       Subjective    Pt agreeable to try sitting EOB. \"I want to stay strong.\"     Objective       07/10/21 0919   Prior Living Situation   Prior Services Intermittent Physical Support for ADL Per Family   Housing / Facility 1 Story House  (moving to new 1 story house)   Steps Into Home 1   Bathroom Set up Walk In Shower;Shower Chair   Equipment Owned Unable to Determine At This Time   Lives with - Patient's Self Care Capacity Spouse   Comments pt most recently at inpatient rehab; moving to new home; had been perform most ADLs without assistance at PLOF and ambulating without AD   Prior Level of Functional Mobility   Bed Mobility Independent   Transfer " Status Independent   Ambulation Independent   Distance Ambulation (Feet)   (household)   Assistive Devices Used   (intermittent FWW as needed)   Stairs Unable To Determine At This Time   History of Falls   History of Falls Yes   Date of Last Fall   (frequent syncope)   Cognition    Cognition / Consciousness X   Speech/ Communication Delayed Responses   Level of Consciousness Alert   Attention Impaired   Comments flat affect   Strength Lower Body   Comments appears weak and effortful with LE movements in bed   Balance Assessment   Comments unable to sit   Gait Analysis   Gait Level Of Assist Unable to Participate   Comments unable to stand   Bed Mobility    Comments participating with min A until syncope   Functional Mobility   Sit to Stand Unable to Participate   How much difficulty does the patient currently have...   Turning over in bed (including adjusting bedclothes, sheets and blankets)? 1   Sitting down on and standing up from a chair with arms (e.g., wheelchair, bedside commode, etc.) 1   Moving from lying on back to sitting on the side of the bed? 1   How much help from another person does the patient currently need...   Moving to and from a bed to a chair (including a wheelchair)? 1   Need to walk in a hospital room? 1   Climbing 3-5 steps with a railing? 1   6 clicks Mobility Score 6   Activity Tolerance   Sitting in Chair unable   Sitting Edge of Bed unable   Standing unable   Comments syncope with attempt to sit   Education Group   Education Provided Role of Physical Therapist   Role of Physical Therapist Patient Response Patient;Significant Other;Acceptance;Explanation;Verbal Demonstration   Problem List    Problems   (syncope)   Anticipated Discharge Equipment and Recommendations   DC Equipment Recommendations Unable to determine at this time   Discharge Recommendations Other -  (not safe for D/C; requires further medical management)

## 2021-07-10 NOTE — DISCHARGE PLANNING
Donan was sent acute from [facility].  Would apprecite a PMR consult referral and TX evals once appropriate.  Will follow up with Physiatry for a potential return once medically cleared.

## 2021-07-10 NOTE — PROGRESS NOTES
Patient had new lactic level 7.9, update hosipitalist Dr. Noble. Looking at recent labs patients blood glucose was low, this RN rechecked it and it was 33. Notified Dr. Noble and gave OJ. Patient then began to shake, eyes fluttered and then passed out. Notified flex nurse, called a rapid, notified Dr. Noble and pushed D50W. Rapid team arrived and assessed patient. New orders place for lab work and D5 NS. Patient then had two more syncopal episodes while rapid team bedside. Dr. Noble came bedside to evaluate. Recheck blood glucose, within normal limits. Intensivist consulted and no new orders but to trend lactic with fluids. Will continue to monitor.
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
## 2021-07-10 NOTE — DIETARY
"Nutrition services: Day 1 of admit.  Donna Isaac is a 56 y.o. female with admitting DX of GI bleed.   Consult received for failure to thrive. Severe protein-calorie malnutrition in active problem list.     Visited pt at bedside, pt appears adequately nourished. Pt states she has been eating very minimally for the past 4-5 days d/t pain with eating 2' thrush. Pt states that the nystatin rinse has helped with this discomfort and she is hopeful to increase PO intake. Pt states she ordered easy to chew foods from Nutrition Representative just to be safe. Pt stated that her appetite had picked up while at rehab, and states she was eating well there prior to onset of thrush. When asked about weight trend, pt states she gained 40 lb recently, but has been stable since.     Assessment:  Height: 162.6 cm (5' 4.02\")  Weight: 100 kg (220 lb 7.4 oz) - per other healthcare provider (100 kg per bed scale at rehab on 7/4)  Body mass index is 37.82 kg/m²., BMI classification: obese, class II  Diet/Intake: cardiac; 0% consumed of breakfast per ADLs    Evaluation:   1. Pt transferred from [facility] (admitted there 6/28) d/t bloody stools  2. No measured weight since admit, however appears pt had gained some weight at rehab, up 4.1 kg from weight on 6/17  3. MAR: calcitonin nasal spray, solu-cortef, synthroid, nystatin mouth wash, vancomycin, vitamin D, D5 NS @ 150 ml/hr (684 kcal/day from dextrose)  4. Labs: Na 132, glucose 108, LFTs elevated, total bilirubin 7.1, phos 4.8  5. Fluid status: pitting edema documented to RUE, RLE, and LLE (3+ to 4+), generalized edema documented as well (per flowsheet)    Severe protein-calorie malnutrition in active problem list.   Malnutrition Risk: Severe acute illness/injury related malnutrition r/t thrush, evidenced by energy intake < 50% of needs for ~5 days per pt report and severe fluid accumulation with 3-4+ pitting edema.     Recommendations/Plan:   1. Boost Plus 1x day to " increase protein intake  2. Encourage intake of meals  3. Document intake of all meals as % taken in ADL's to provide interdisciplinary communication across all shifts.   4. Monitor weight.  5. Nutrition rep will continue to see patient for ongoing meal and snack preferences.   6. RD to monitor PO intake, wt trends, and nutrition labs/meds    RD to follow

## 2021-07-11 ENCOUNTER — APPOINTMENT (OUTPATIENT)
Dept: RADIOLOGY | Facility: MEDICAL CENTER | Age: 56
DRG: 441 | End: 2021-07-11
Attending: SURGERY
Payer: MEDICARE

## 2021-07-11 ENCOUNTER — APPOINTMENT (OUTPATIENT)
Dept: RADIOLOGY | Facility: MEDICAL CENTER | Age: 56
DRG: 441 | End: 2021-07-11
Payer: MEDICARE

## 2021-07-11 PROBLEM — E43 SEVERE PROTEIN-CALORIE MALNUTRITION (HCC): Status: RESOLVED | Noted: 2021-07-09 | Resolved: 2021-07-11

## 2021-07-11 PROBLEM — K55.9 ISCHEMIC COLITIS (HCC): Status: ACTIVE | Noted: 2021-07-11

## 2021-07-11 PROBLEM — D65 DIC (DISSEMINATED INTRAVASCULAR COAGULATION) (HCC): Status: ACTIVE | Noted: 2021-07-11

## 2021-07-11 LAB
ALBUMIN SERPL BCP-MCNC: 3.8 G/DL (ref 3.2–4.9)
ALBUMIN/GLOB SERPL: 4.2 G/DL
ALP SERPL-CCNC: 209 U/L (ref 30–99)
ALT SERPL-CCNC: 41 U/L (ref 2–50)
ANION GAP SERPL CALC-SCNC: 16 MMOL/L (ref 7–16)
ANION GAP SERPL CALC-SCNC: 16 MMOL/L (ref 7–16)
ANION GAP SERPL CALC-SCNC: 17 MMOL/L (ref 7–16)
ANION GAP SERPL CALC-SCNC: 17 MMOL/L (ref 7–16)
ANION GAP SERPL CALC-SCNC: 18 MMOL/L (ref 7–16)
ANISOCYTOSIS BLD QL SMEAR: ABNORMAL
APTT PPP: 37.5 SEC (ref 24.7–36)
APTT PPP: 43.4 SEC (ref 24.7–36)
AST SERPL-CCNC: 45 U/L (ref 12–45)
BACTERIA WND AEROBE CULT: ABNORMAL
BACTERIA WND AEROBE CULT: ABNORMAL
BARCODED ABORH UBTYP: 1700
BARCODED ABORH UBTYP: 5100
BARCODED ABORH UBTYP: 6200
BARCODED PRD CODE UBPRD: NORMAL
BARCODED UNIT NUM UBUNT: NORMAL
BASOPHILS # BLD AUTO: 0 % (ref 0–1.8)
BASOPHILS # BLD AUTO: 0 % (ref 0–1.8)
BASOPHILS # BLD AUTO: 0.3 % (ref 0–1.8)
BASOPHILS # BLD: 0 K/UL (ref 0–0.12)
BASOPHILS # BLD: 0 K/UL (ref 0–0.12)
BASOPHILS # BLD: 0.01 K/UL (ref 0–0.12)
BILIRUB SERPL-MCNC: 8.6 MG/DL (ref 0.1–1.5)
BLD GP AB SCN SERPL QL: NORMAL
BUN SERPL-MCNC: 13 MG/DL (ref 8–22)
BUN SERPL-MCNC: 14 MG/DL (ref 8–22)
BUN SERPL-MCNC: 14 MG/DL (ref 8–22)
CALCIUM SERPL-MCNC: 8 MG/DL (ref 8.5–10.5)
CALCIUM SERPL-MCNC: 8.3 MG/DL (ref 8.5–10.5)
CALCIUM SERPL-MCNC: 8.3 MG/DL (ref 8.5–10.5)
CALCIUM SERPL-MCNC: 8.4 MG/DL (ref 8.5–10.5)
CALCIUM SERPL-MCNC: 8.7 MG/DL (ref 8.5–10.5)
CFT BLD TEG: 5.1 MIN (ref 5–10)
CFT BLD TEG: 5.7 MIN (ref 5–10)
CHLORIDE SERPL-SCNC: 100 MMOL/L (ref 96–112)
CHLORIDE SERPL-SCNC: 101 MMOL/L (ref 96–112)
CHLORIDE SERPL-SCNC: 98 MMOL/L (ref 96–112)
CHLORIDE SERPL-SCNC: 98 MMOL/L (ref 96–112)
CHLORIDE SERPL-SCNC: 99 MMOL/L (ref 96–112)
CLOT ANGLE BLD TEG: 53.1 DEGREES (ref 53–72)
CLOT ANGLE BLD TEG: 58.1 DEGREES (ref 53–72)
CLOT LYSIS 30M P MA LENFR BLD TEG: 0 % (ref 0–8)
CLOT LYSIS 30M P MA LENFR BLD TEG: 0 % (ref 0–8)
CO2 SERPL-SCNC: 21 MMOL/L (ref 20–33)
CO2 SERPL-SCNC: 22 MMOL/L (ref 20–33)
CO2 SERPL-SCNC: 23 MMOL/L (ref 20–33)
COMMENT 1642: NORMAL
COMPONENT CT 8504CT: NORMAL
COMPONENT F 8504F: NORMAL
COMPONENT F 8504F: NORMAL
COMPONENT P 8504P: NORMAL
COMPONENT P 8504P: NORMAL
CREAT SERPL-MCNC: 0.82 MG/DL (ref 0.5–1.4)
CREAT SERPL-MCNC: 0.83 MG/DL (ref 0.5–1.4)
CREAT SERPL-MCNC: 0.86 MG/DL (ref 0.5–1.4)
CREAT SERPL-MCNC: 0.89 MG/DL (ref 0.5–1.4)
CREAT SERPL-MCNC: 0.98 MG/DL (ref 0.5–1.4)
CT.EXTRINSIC BLD ROTEM: 3.2 MIN (ref 1–3)
CT.EXTRINSIC BLD ROTEM: 3.4 MIN (ref 1–3)
D DIMER PPP IA.FEU-MCNC: 0.92 UG/ML (FEU) (ref 0–0.5)
D DIMER PPP IA.FEU-MCNC: 0.96 UG/ML (FEU) (ref 0–0.5)
DAT C3D-SP REAG RBC QL: NORMAL
DAT IGG-SP REAG RBC QL: NORMAL
EOSINOPHIL # BLD AUTO: 0 K/UL (ref 0–0.51)
EOSINOPHIL NFR BLD: 0 % (ref 0–6.9)
ERYTHROCYTE [DISTWIDTH] IN BLOOD BY AUTOMATED COUNT: 61.4 FL (ref 35.9–50)
ERYTHROCYTE [DISTWIDTH] IN BLOOD BY AUTOMATED COUNT: 62.7 FL (ref 35.9–50)
ERYTHROCYTE [DISTWIDTH] IN BLOOD BY AUTOMATED COUNT: 63.7 FL (ref 35.9–50)
FIBRINOGEN PPP-MCNC: 103 MG/DL (ref 215–460)
FIBRINOGEN PPP-MCNC: 130 MG/DL (ref 215–460)
FIBRINOGEN PPP-MCNC: 150 MG/DL (ref 215–460)
GLOBULIN SER CALC-MCNC: 0.9 G/DL (ref 1.9–3.5)
GLUCOSE BLD-MCNC: 129 MG/DL (ref 65–99)
GLUCOSE SERPL-MCNC: 111 MG/DL (ref 65–99)
GLUCOSE SERPL-MCNC: 118 MG/DL (ref 65–99)
GLUCOSE SERPL-MCNC: 122 MG/DL (ref 65–99)
GLUCOSE SERPL-MCNC: 130 MG/DL (ref 65–99)
GLUCOSE SERPL-MCNC: 144 MG/DL (ref 65–99)
GRAM STN SPEC: ABNORMAL
HCT VFR BLD AUTO: 22.6 % (ref 37–47)
HCT VFR BLD AUTO: 23 % (ref 37–47)
HCT VFR BLD AUTO: 24.2 % (ref 37–47)
HCT VFR BLD AUTO: 26 % (ref 37–47)
HGB BLD-MCNC: 7.4 G/DL (ref 12–16)
HGB BLD-MCNC: 7.5 G/DL (ref 12–16)
HGB BLD-MCNC: 8 G/DL (ref 12–16)
HGB BLD-MCNC: 8.7 G/DL (ref 12–16)
HYPOCHROMIA BLD QL SMEAR: ABNORMAL
IMM GRANULOCYTES # BLD AUTO: 0.02 K/UL (ref 0–0.11)
IMM GRANULOCYTES # BLD AUTO: 0.02 K/UL (ref 0–0.11)
IMM GRANULOCYTES # BLD AUTO: 0.03 K/UL (ref 0–0.11)
IMM GRANULOCYTES NFR BLD AUTO: 0.5 % (ref 0–0.9)
IMM GRANULOCYTES NFR BLD AUTO: 0.5 % (ref 0–0.9)
IMM GRANULOCYTES NFR BLD AUTO: 0.7 % (ref 0–0.9)
INR PPP: 1.84 (ref 0.87–1.13)
INR PPP: 2.14 (ref 0.87–1.13)
LACTATE BLD-SCNC: 6 MMOL/L (ref 0.5–2)
LACTATE BLD-SCNC: 6.5 MMOL/L (ref 0.5–2)
LDH SERPL L TO P-CCNC: 240 U/L (ref 107–266)
LYMPHOCYTES # BLD AUTO: 0.68 K/UL (ref 1–4.8)
LYMPHOCYTES # BLD AUTO: 0.74 K/UL (ref 1–4.8)
LYMPHOCYTES # BLD AUTO: 0.79 K/UL (ref 1–4.8)
LYMPHOCYTES NFR BLD: 16.5 % (ref 22–41)
LYMPHOCYTES NFR BLD: 19.1 % (ref 22–41)
LYMPHOCYTES NFR BLD: 19.6 % (ref 22–41)
MAGNESIUM SERPL-MCNC: 1.9 MG/DL (ref 1.5–2.5)
MCF BLD TEG: 53 MM (ref 50–70)
MCF BLD TEG: 53.4 MM (ref 50–70)
MCH RBC QN AUTO: 29.5 PG (ref 27–33)
MCH RBC QN AUTO: 29.8 PG (ref 27–33)
MCH RBC QN AUTO: 30.4 PG (ref 27–33)
MCHC RBC AUTO-ENTMCNC: 32.6 G/DL (ref 33.6–35)
MCHC RBC AUTO-ENTMCNC: 32.7 G/DL (ref 33.6–35)
MCHC RBC AUTO-ENTMCNC: 33.1 G/DL (ref 33.6–35)
MCV RBC AUTO: 90.6 FL (ref 81.4–97.8)
MCV RBC AUTO: 91.1 FL (ref 81.4–97.8)
MCV RBC AUTO: 92 FL (ref 81.4–97.8)
MICROCYTES BLD QL SMEAR: ABNORMAL
MONOCYTES # BLD AUTO: 0.32 K/UL (ref 0–0.85)
MONOCYTES # BLD AUTO: 0.36 K/UL (ref 0–0.85)
MONOCYTES # BLD AUTO: 0.38 K/UL (ref 0–0.85)
MONOCYTES NFR BLD AUTO: 8.5 % (ref 0–13.4)
MONOCYTES NFR BLD AUTO: 8.7 % (ref 0–13.4)
MONOCYTES NFR BLD AUTO: 9.2 % (ref 0–13.4)
MORPHOLOGY BLD-IMP: NORMAL
NEUTROPHILS # BLD AUTO: 2.68 K/UL (ref 2–7.15)
NEUTROPHILS # BLD AUTO: 2.96 K/UL (ref 2–7.15)
NEUTROPHILS # BLD AUTO: 3.02 K/UL (ref 2–7.15)
NEUTROPHILS NFR BLD: 71.1 % (ref 44–72)
NEUTROPHILS NFR BLD: 71.7 % (ref 44–72)
NEUTROPHILS NFR BLD: 73.6 % (ref 44–72)
NRBC # BLD AUTO: 0.04 K/UL
NRBC # BLD AUTO: 0.08 K/UL
NRBC # BLD AUTO: 0.13 K/UL
NRBC BLD-RTO: 1 /100 WBC
NRBC BLD-RTO: 2.1 /100 WBC
NRBC BLD-RTO: 3.1 /100 WBC
OVALOCYTES BLD QL SMEAR: NORMAL
PA AA BLD-ACNC: ABNORMAL %
PA AA BLD-ACNC: ABNORMAL %
PA ADP BLD-ACNC: ABNORMAL %
PA ADP BLD-ACNC: ABNORMAL %
PHOSPHATE SERPL-MCNC: 4.8 MG/DL (ref 2.5–4.5)
PLATELET # BLD AUTO: 24 K/UL (ref 164–446)
PLATELET # BLD AUTO: 26 K/UL (ref 164–446)
PLATELET # BLD AUTO: 92 K/UL (ref 164–446)
PLATELET BLD QL SMEAR: NORMAL
PLATELETS.RETICULATED NFR BLD AUTO: 19.7 K/UL (ref 0.6–13.1)
PMV BLD AUTO: 11.9 FL (ref 9–12.9)
POIKILOCYTOSIS BLD QL SMEAR: NORMAL
POTASSIUM SERPL-SCNC: 5 MMOL/L (ref 3.6–5.5)
POTASSIUM SERPL-SCNC: 5.2 MMOL/L (ref 3.6–5.5)
PRODUCT TYPE UPROD: NORMAL
PROT SERPL-MCNC: 4.7 G/DL (ref 6–8.2)
PROTHROMBIN TIME: 20.7 SEC (ref 12–14.6)
PROTHROMBIN TIME: 23.2 SEC (ref 12–14.6)
RBC # BLD AUTO: 2.48 M/UL (ref 4.2–5.4)
RBC # BLD AUTO: 2.54 M/UL (ref 4.2–5.4)
RBC # BLD AUTO: 2.63 M/UL (ref 4.2–5.4)
RBC BLD AUTO: PRESENT
SIGNIFICANT IND 70042: ABNORMAL
SITE SITE: ABNORMAL
SODIUM SERPL-SCNC: 137 MMOL/L (ref 135–145)
SODIUM SERPL-SCNC: 139 MMOL/L (ref 135–145)
SODIUM SERPL-SCNC: 140 MMOL/L (ref 135–145)
SOURCE SOURCE: ABNORMAL
TARGETS BLD QL SMEAR: NORMAL
TEG ALGORITHM TGALG: ABNORMAL
TEG ALGORITHM TGALG: ABNORMAL
UNIT STATUS USTAT: NORMAL
WBC # BLD AUTO: 3.8 K/UL (ref 4.8–10.8)
WBC # BLD AUTO: 4.1 K/UL (ref 4.8–10.8)
WBC # BLD AUTO: 4.1 K/UL (ref 4.8–10.8)

## 2021-07-11 PROCEDURE — 36556 INSERT NON-TUNNEL CV CATH: CPT

## 2021-07-11 PROCEDURE — 02HV33Z INSERTION OF INFUSION DEVICE INTO SUPERIOR VENA CAVA, PERCUTANEOUS APPROACH: ICD-10-PCS | Performed by: SURGERY

## 2021-07-11 PROCEDURE — 700111 HCHG RX REV CODE 636 W/ 250 OVERRIDE (IP): Performed by: FAMILY MEDICINE

## 2021-07-11 PROCEDURE — 85018 HEMOGLOBIN: CPT

## 2021-07-11 PROCEDURE — 700102 HCHG RX REV CODE 250 W/ 637 OVERRIDE(OP): Performed by: HOSPITALIST

## 2021-07-11 PROCEDURE — 80048 BASIC METABOLIC PNL TOTAL CA: CPT

## 2021-07-11 PROCEDURE — 30233M1 TRANSFUSION OF NONAUTOLOGOUS PLASMA CRYOPRECIPITATE INTO PERIPHERAL VEIN, PERCUTANEOUS APPROACH: ICD-10-PCS | Performed by: INTERNAL MEDICINE

## 2021-07-11 PROCEDURE — P9034 PLATELETS, PHERESIS: HCPCS

## 2021-07-11 PROCEDURE — 85025 COMPLETE CBC W/AUTO DIFF WBC: CPT | Mod: 91

## 2021-07-11 PROCEDURE — 85362 FIBRIN DEGRADATION PRODUCTS: CPT

## 2021-07-11 PROCEDURE — C1751 CATH, INF, PER/CENT/MIDLINE: HCPCS

## 2021-07-11 PROCEDURE — 85384 FIBRINOGEN ACTIVITY: CPT

## 2021-07-11 PROCEDURE — 85610 PROTHROMBIN TIME: CPT

## 2021-07-11 PROCEDURE — 99233 SBSQ HOSP IP/OBS HIGH 50: CPT | Mod: GC | Performed by: HOSPITALIST

## 2021-07-11 PROCEDURE — P9012 CRYOPRECIPITATE EACH UNIT: HCPCS | Mod: 91

## 2021-07-11 PROCEDURE — 30233K1 TRANSFUSION OF NONAUTOLOGOUS FROZEN PLASMA INTO PERIPHERAL VEIN, PERCUTANEOUS APPROACH: ICD-10-PCS | Performed by: INTERNAL MEDICINE

## 2021-07-11 PROCEDURE — 85730 THROMBOPLASTIN TIME PARTIAL: CPT

## 2021-07-11 PROCEDURE — 770022 HCHG ROOM/CARE - ICU (200)

## 2021-07-11 PROCEDURE — 83605 ASSAY OF LACTIC ACID: CPT

## 2021-07-11 PROCEDURE — 700105 HCHG RX REV CODE 258: Performed by: FAMILY MEDICINE

## 2021-07-11 PROCEDURE — 86923 COMPATIBILITY TEST ELECTRIC: CPT

## 2021-07-11 PROCEDURE — P9017 PLASMA 1 DONOR FRZ W/IN 8 HR: HCPCS

## 2021-07-11 PROCEDURE — 36430 TRANSFUSION BLD/BLD COMPNT: CPT

## 2021-07-11 PROCEDURE — 770024 HCHG ROOM/CARE - REHAB LOA (180)

## 2021-07-11 PROCEDURE — A9270 NON-COVERED ITEM OR SERVICE: HCPCS | Performed by: FAMILY MEDICINE

## 2021-07-11 PROCEDURE — A9270 NON-COVERED ITEM OR SERVICE: HCPCS | Performed by: HOSPITALIST

## 2021-07-11 PROCEDURE — 84100 ASSAY OF PHOSPHORUS: CPT

## 2021-07-11 PROCEDURE — 70220 X-RAY EXAM OF SINUSES: CPT

## 2021-07-11 PROCEDURE — 85576 BLOOD PLATELET AGGREGATION: CPT

## 2021-07-11 PROCEDURE — A9270 NON-COVERED ITEM OR SERVICE: HCPCS | Performed by: INTERNAL MEDICINE

## 2021-07-11 PROCEDURE — P9016 RBC LEUKOCYTES REDUCED: HCPCS

## 2021-07-11 PROCEDURE — 99291 CRITICAL CARE FIRST HOUR: CPT | Performed by: INTERNAL MEDICINE

## 2021-07-11 PROCEDURE — 83735 ASSAY OF MAGNESIUM: CPT

## 2021-07-11 PROCEDURE — 700111 HCHG RX REV CODE 636 W/ 250 OVERRIDE (IP): Performed by: HOSPITALIST

## 2021-07-11 PROCEDURE — 80053 COMPREHEN METABOLIC PANEL: CPT

## 2021-07-11 PROCEDURE — 86880 COOMBS TEST DIRECT: CPT | Mod: 91

## 2021-07-11 PROCEDURE — 700102 HCHG RX REV CODE 250 W/ 637 OVERRIDE(OP): Performed by: INTERNAL MEDICINE

## 2021-07-11 PROCEDURE — 71045 X-RAY EXAM CHEST 1 VIEW: CPT

## 2021-07-11 PROCEDURE — 85347 COAGULATION TIME ACTIVATED: CPT

## 2021-07-11 PROCEDURE — 700102 HCHG RX REV CODE 250 W/ 637 OVERRIDE(OP): Performed by: FAMILY MEDICINE

## 2021-07-11 PROCEDURE — 85055 RETICULATED PLATELET ASSAY: CPT

## 2021-07-11 PROCEDURE — 83615 LACTATE (LD) (LDH) ENZYME: CPT

## 2021-07-11 PROCEDURE — 85014 HEMATOCRIT: CPT

## 2021-07-11 PROCEDURE — 85379 FIBRIN DEGRADATION QUANT: CPT

## 2021-07-11 PROCEDURE — 30233R1 TRANSFUSION OF NONAUTOLOGOUS PLATELETS INTO PERIPHERAL VEIN, PERCUTANEOUS APPROACH: ICD-10-PCS | Performed by: INTERNAL MEDICINE

## 2021-07-11 PROCEDURE — 82962 GLUCOSE BLOOD TEST: CPT

## 2021-07-11 PROCEDURE — 86850 RBC ANTIBODY SCREEN: CPT

## 2021-07-11 RX ORDER — HYDROCORTISONE 10 MG/1
10 TABLET ORAL EVERY EVENING
Status: DISCONTINUED | OUTPATIENT
Start: 2021-07-11 | End: 2021-07-30 | Stop reason: HOSPADM

## 2021-07-11 RX ORDER — HYDROCORTISONE 20 MG/1
20 TABLET ORAL EVERY MORNING
Status: DISCONTINUED | OUTPATIENT
Start: 2021-07-12 | End: 2021-07-30 | Stop reason: HOSPADM

## 2021-07-11 RX ORDER — PHYTONADIONE 5 MG/1
5 TABLET ORAL ONCE
Status: COMPLETED | OUTPATIENT
Start: 2021-07-11 | End: 2021-07-11

## 2021-07-11 RX ADMIN — HYDROCORTISONE SODIUM SUCCINATE 150 MG: 100 INJECTION, POWDER, FOR SOLUTION INTRAMUSCULAR; INTRAVENOUS at 13:50

## 2021-07-11 RX ADMIN — HYDROCORTISONE SODIUM SUCCINATE 150 MG: 100 INJECTION, POWDER, FOR SOLUTION INTRAMUSCULAR; INTRAVENOUS at 06:13

## 2021-07-11 RX ADMIN — GABAPENTIN 400 MG: 400 CAPSULE ORAL at 06:14

## 2021-07-11 RX ADMIN — Medication 2000 UNITS: at 06:10

## 2021-07-11 RX ADMIN — DULOXETINE HYDROCHLORIDE 60 MG: 60 CAPSULE, DELAYED RELEASE ORAL at 21:31

## 2021-07-11 RX ADMIN — SUMATRIPTAN SUCCINATE 50 MG: 50 TABLET ORAL at 06:10

## 2021-07-11 RX ADMIN — Medication: at 06:15

## 2021-07-11 RX ADMIN — NYSTATIN: 100000 POWDER TOPICAL at 06:15

## 2021-07-11 RX ADMIN — MEROPENEM 0.5 G: 500 INJECTION, POWDER, FOR SOLUTION INTRAVENOUS at 01:21

## 2021-07-11 RX ADMIN — NYSTATIN 500000 UNITS: 100000 SUSPENSION ORAL at 12:25

## 2021-07-11 RX ADMIN — COLESEVELAM HYDROCHLORIDE 625 MG: 625 TABLET, COATED ORAL at 21:25

## 2021-07-11 RX ADMIN — COLESEVELAM HYDROCHLORIDE 625 MG: 625 TABLET, COATED ORAL at 08:41

## 2021-07-11 RX ADMIN — LEVOTHYROXINE SODIUM 75 MCG: 0.07 TABLET ORAL at 06:00

## 2021-07-11 RX ADMIN — AMITRIPTYLINE HYDROCHLORIDE 10 MG: 10 TABLET, FILM COATED ORAL at 06:14

## 2021-07-11 RX ADMIN — LIOTHYRONINE SODIUM 25 MCG: 25 TABLET ORAL at 21:25

## 2021-07-11 RX ADMIN — NYSTATIN 500000 UNITS: 100000 SUSPENSION ORAL at 21:31

## 2021-07-11 RX ADMIN — MIDODRINE HYDROCHLORIDE 5 MG: 5 TABLET ORAL at 12:26

## 2021-07-11 RX ADMIN — COLESEVELAM HYDROCHLORIDE 625 MG: 625 TABLET, COATED ORAL at 13:50

## 2021-07-11 RX ADMIN — NYSTATIN 500000 UNITS: 100000 SUSPENSION ORAL at 08:41

## 2021-07-11 RX ADMIN — CALCITONIN SALMON 200 UNITS: 200 SPRAY, METERED NASAL at 21:28

## 2021-07-11 RX ADMIN — PHYTONADIONE 5 MG: 5 TABLET ORAL at 12:31

## 2021-07-11 RX ADMIN — NYSTATIN: 100000 POWDER TOPICAL at 12:30

## 2021-07-11 RX ADMIN — MEROPENEM 0.5 G: 500 INJECTION, POWDER, FOR SOLUTION INTRAVENOUS at 12:26

## 2021-07-11 RX ADMIN — ACETAMINOPHEN 650 MG: 325 TABLET, FILM COATED ORAL at 06:10

## 2021-07-11 ASSESSMENT — ENCOUNTER SYMPTOMS
SORE THROAT: 1
EYE PAIN: 0
FALLS: 1
PALPITATIONS: 0
BLOOD IN STOOL: 1
DOUBLE VISION: 0
COUGH: 0
DIZZINESS: 1
CONSTIPATION: 0
EYES NEGATIVE: 1
DIARRHEA: 1
BACK PAIN: 1
PSYCHIATRIC NEGATIVE: 1
HEMOPTYSIS: 0
HEARTBURN: 0
SPUTUM PRODUCTION: 0
BLURRED VISION: 0
RESPIRATORY NEGATIVE: 1
NAUSEA: 0
CONSTITUTIONAL NEGATIVE: 1
EYE DISCHARGE: 0
FEVER: 0
WEAKNESS: 1
EYE REDNESS: 0
SHORTNESS OF BREATH: 1
VOMITING: 0
ABDOMINAL PAIN: 1
CHILLS: 0
NAUSEA: 1

## 2021-07-11 ASSESSMENT — PAIN DESCRIPTION - PAIN TYPE
TYPE: ACUTE PAIN
TYPE: CHRONIC PAIN;ACUTE PAIN
TYPE: ACUTE PAIN
TYPE: CHRONIC PAIN

## 2021-07-11 NOTE — CONSULTS
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 ACID    Collection Time: 07/10/21  9:13 AM   Result Value Ref Range    Lactic Acid 7.4 (HH) 0.5 - 2.0 mmol/L   LACTIC ACID    Collection Time: 07/10/21 11:17 AM   Result Value Ref Range    Lactic Acid 6.1 (HH) 0.5 - 2.0 mmol/L   Comp Metabolic Panel    Collection Time: 07/10/21 11:17 AM   Result Value Ref Range    Sodium 132 (L) 135 - 145 mmol/L    Potassium 5.3 3.6 - 5.5 mmol/L    Chloride 96 96 - 112 mmol/L    Co2 22 20 - 33 mmol/L    Anion Gap 14.0 7.0 - 16.0    Glucose 108 (H) 65 - 99 mg/dL    Bun 15 8 - 22 mg/dL    Creatinine 1.18 0.50 - 1.40 mg/dL    Calcium 7.0 (L) 8.5 - 10.5 mg/dL    AST(SGOT) 74 (H) 12 - 45 U/L    ALT(SGPT) 64 (H) 2 - 50 U/L    Alkaline Phosphatase 293 (H) 30 - 99 U/L    Total Bilirubin 7.1 (H) 0.1 - 1.5 mg/dL    Albumin 1.7 (L) 3.2 - 4.9 g/dL    Total Protein 3.0 (L) 6.0 - 8.2 g/dL    Globulin 1.3 (L) 1.9 - 3.5 g/dL    A-G Ratio 1.3 g/dL   MAGNESIUM    Collection Time: 07/10/21 11:17 AM   Result Value Ref Range     Magnesium 1.7 1.5 - 2.5 mg/dL   PHOSPHORUS    Collection Time: 07/10/21 11:17 AM   Result Value Ref Range    Phosphorus 4.8 (H) 2.5 - 4.5 mg/dL   HEMOGLOBIN AND HEMATOCRIT    Collection Time: 07/10/21 11:17 AM   Result Value Ref Range    Hemoglobin 10.1 (L) 12.0 - 16.0 g/dL    Hematocrit 29.6 (L) 37.0 - 47.0 %   BILIRUBIN DIRECT    Collection Time: 07/10/21 11:17 AM   Result Value Ref Range    Direct Bilirubin 6.3 (H) 0.1 - 0.5 mg/dL   BILIRUBIN INDIRECT    Collection Time: 07/10/21 11:17 AM   Result Value Ref Range    Indirect Bilirubin 0.8 0.0 - 1.0 mg/dL   LDH    Collection Time: 07/10/21 11:17 AM   Result Value Ref Range    LDH Total 297 (H) 107 - 266 U/L   CRP QUANTITIVE (NON-CARDIAC)    Collection Time: 07/10/21 11:17 AM   Result Value Ref Range    Stat C-Reactive Protein 5.68 (H) 0.00 - 0.75 mg/dL   RHEUMATOID ARTHRITIS FACTOR    Collection Time: 07/10/21 11:17 AM   Result Value Ref Range    Rheumatoid Factor -Neph- <10 0 - 14 IU/mL   LIPASE    Collection Time: 07/10/21 11:17 AM   Result Value Ref Range    Lipase 52 11 - 82 U/L   Prothrombin Time    Collection Time: 07/10/21 11:17 AM   Result Value Ref Range    PT 23.9 (H) 12.0 - 14.6 sec    INR 2.22 (H) 0.87 - 1.13   APTT    Collection Time: 07/10/21 11:17 AM   Result Value Ref Range    APTT 51.1 (H) 24.7 - 36.0 sec   D-DIMER    Collection Time: 07/10/21 11:17 AM   Result Value Ref Range    D-Dimer Screen 0.66 (H) 0.00 - 0.50 ug/mL (FEU)   FIBRINOGEN    Collection Time: 07/10/21 11:17 AM   Result Value Ref Range    Fibrinogen 111 (L) 215 - 460 mg/dL   Sed Rate    Collection Time: 07/10/21 11:17 AM   Result Value Ref Range    Sed Rate Westergren 6 0 - 25 mm/hour   ESTIMATED GFR    Collection Time: 07/10/21 11:17 AM   Result Value Ref Range    GFR If  57 (A) >60 mL/min/1.73 m 2    GFR If Non  47 (A) >60 mL/min/1.73 m 2   FRESH FROZEN PLASMA    Collection Time: 07/10/21  3:46 PM   Result Value Ref Range    Component F       TA                   Thawed Plasma       [id number]   transfused   07/10/21   16:26      Product Type Thawed Apheresis Plasma     Dispense Status transfused     Unit Number (Barcoded) [id number]     Product Code (Barcoded) T1164F12     Blood Type (Barcoded) 5100     Component F       TA                  Thawed Plasma       [id number]   issued       07/11/21   03:27      Product Type Thawed Apheresis Plasma     Dispense Status issued     Unit Number (Barcoded) [id number]     Product Code (Barcoded) G2259I66     Blood Type (Barcoded) 5100    LACTIC ACID    Collection Time: 07/10/21  5:06 PM   Result Value Ref Range    Lactic Acid 7.3 (HH) 0.5 - 2.0 mmol/L   Basic Metabolic Panel    Collection Time: 07/10/21 10:25 PM   Result Value Ref Range    Sodium 138 135 - 145 mmol/L    Potassium 5.0 3.6 - 5.5 mmol/L    Chloride 100 96 - 112 mmol/L    Co2 23 20 - 33 mmol/L    Glucose 159 (H) 65 - 99 mg/dL    Bun 14 8 - 22 mg/dL    Creatinine 1.02 0.50 - 1.40 mg/dL    Calcium 8.0 (L) 8.5 - 10.5 mg/dL    Anion Gap 15.0 7.0 - 16.0   LACTIC ACID    Collection Time: 07/10/21 10:25 PM   Result Value Ref Range    Lactic Acid 5.9 (HH) 0.5 - 2.0 mmol/L   ESTIMATED GFR    Collection Time: 07/10/21 10:25 PM   Result Value Ref Range    GFR If African American >60 >60 mL/min/1.73 m 2    GFR If Non  56 (A) >60 mL/min/1.73 m 2   CBC WITH DIFFERENTIAL    Collection Time: 07/11/21  1:30 AM   Result Value Ref Range    WBC 4.1 (L) 4.8 - 10.8 K/uL    RBC 2.54 (L) 4.20 - 5.40 M/uL    Hemoglobin 7.5 (L) 12.0 - 16.0 g/dL    Hematocrit 23.0 (L) 37.0 - 47.0 %    MCV 90.6 81.4 - 97.8 fL    MCH 29.5 27.0 - 33.0 pg    MCHC 32.6 (L) 33.6 - 35.0 g/dL    RDW 61.4 (H) 35.9 - 50.0 fL    Platelet Count 24 (LL) 164 - 446 K/uL    Neutrophils-Polys 73.60 (H) 44.00 - 72.00 %    Lymphocytes 16.50 (L) 22.00 - 41.00 %    Monocytes 9.20 0.00 - 13.40 %    Eosinophils 0.00 0.00 - 6.90 %    Basophils 0.00 0.00 - 1.80 %    Immature Granulocytes 0.70 0.00  - 0.90 %    Nucleated RBC 1.00 /100 WBC    Neutrophils (Absolute) 3.02 2.00 - 7.15 K/uL    Lymphs (Absolute) 0.68 (L) 1.00 - 4.80 K/uL    Monos (Absolute) 0.38 0.00 - 0.85 K/uL    Eos (Absolute) 0.00 0.00 - 0.51 K/uL    Baso (Absolute) 0.00 0.00 - 0.12 K/uL    Immature Granulocytes (abs) 0.03 0.00 - 0.11 K/uL    NRBC (Absolute) 0.04 K/uL    Hypochromia 1+     Anisocytosis 1+     Microcytosis 1+    LACTIC ACID    Collection Time: 07/11/21  1:30 AM   Result Value Ref Range    Lactic Acid 6.5 (HH) 0.5 - 2.0 mmol/L   PHOSPHORUS    Collection Time: 07/11/21  1:30 AM   Result Value Ref Range    Phosphorus 4.8 (H) 2.5 - 4.5 mg/dL   MAGNESIUM    Collection Time: 07/11/21  1:30 AM   Result Value Ref Range    Magnesium 1.9 1.5 - 2.5 mg/dL   Basic Metabolic Panel    Collection Time: 07/11/21  1:30 AM   Result Value Ref Range    Sodium 139 135 - 145 mmol/L    Potassium 5.0 3.6 - 5.5 mmol/L    Chloride 100 96 - 112 mmol/L    Co2 23 20 - 33 mmol/L    Glucose 144 (H) 65 - 99 mg/dL    Bun 14 8 - 22 mg/dL    Creatinine 0.98 0.50 - 1.40 mg/dL    Calcium 8.0 (L) 8.5 - 10.5 mg/dL    Anion Gap 16.0 7.0 - 16.0   ESTIMATED GFR    Collection Time: 07/11/21  1:30 AM   Result Value Ref Range    GFR If African American >60 >60 mL/min/1.73 m 2    GFR If Non African American 58 (A) >60 mL/min/1.73 m 2   PERIPHERAL SMEAR REVIEW    Collection Time: 07/11/21  1:30 AM   Result Value Ref Range    Peripheral Smear Review see below    PLATELET ESTIMATE    Collection Time: 07/11/21  1:30 AM   Result Value Ref Range    Plt Estimation Marked Decrease    MORPHOLOGY    Collection Time: 07/11/21  1:30 AM   Result Value Ref Range    RBC Morphology Present     Poikilocytosis 1+     Ovalocytes 1+     Target Cells 1+    IMMATURE PLT FRACTION    Collection Time: 07/11/21  1:30 AM   Result Value Ref Range    Imm. Plt Fraction 19.7 (H) 0.6 - 13.1 K/uL   DIFFERENTIAL COMMENT    Collection Time: 07/11/21  1:30 AM   Result Value Ref Range    Comments-Diff see below     Basic Metabolic Panel    Collection Time: 07/11/21  7:20 AM   Result Value Ref Range    Sodium 137 135 - 145 mmol/L    Potassium 5.0 3.6 - 5.5 mmol/L    Chloride 99 96 - 112 mmol/L    Co2 22 20 - 33 mmol/L    Glucose 118 (H) 65 - 99 mg/dL    Bun 13 8 - 22 mg/dL    Creatinine 0.82 0.50 - 1.40 mg/dL    Calcium 8.3 (L) 8.5 - 10.5 mg/dL    Anion Gap 16.0 7.0 - 16.0   CBC WITH DIFFERENTIAL    Collection Time: 07/11/21  7:20 AM   Result Value Ref Range    WBC 3.8 (L) 4.8 - 10.8 K/uL    RBC 2.63 (L) 4.20 - 5.40 M/uL    Hemoglobin 8.0 (L) 12.0 - 16.0 g/dL    Hematocrit 24.2 (L) 37.0 - 47.0 %    MCV 92.0 81.4 - 97.8 fL    MCH 30.4 27.0 - 33.0 pg    MCHC 33.1 (L) 33.6 - 35.0 g/dL    RDW 62.7 (H) 35.9 - 50.0 fL    Platelet Count 26 (LL) 164 - 446 K/uL    Neutrophils-Polys 71.10 44.00 - 72.00 %    Lymphocytes 19.60 (L) 22.00 - 41.00 %    Monocytes 8.50 0.00 - 13.40 %    Eosinophils 0.00 0.00 - 6.90 %    Basophils 0.30 0.00 - 1.80 %    Immature Granulocytes 0.50 0.00 - 0.90 %    Nucleated RBC 2.10 /100 WBC    Neutrophils (Absolute) 2.68 2.00 - 7.15 K/uL    Lymphs (Absolute) 0.74 (L) 1.00 - 4.80 K/uL    Monos (Absolute) 0.32 0.00 - 0.85 K/uL    Eos (Absolute) 0.00 0.00 - 0.51 K/uL    Baso (Absolute) 0.01 0.00 - 0.12 K/uL    Immature Granulocytes (abs) 0.02 0.00 - 0.11 K/uL    NRBC (Absolute) 0.08 K/uL   Comp Metabolic Panel    Collection Time: 07/11/21  7:20 AM   Result Value Ref Range    Sodium 140 135 - 145 mmol/L    Potassium 5.2 3.6 - 5.5 mmol/L    Chloride 101 96 - 112 mmol/L    Co2 22 20 - 33 mmol/L    Anion Gap 17.0 (H) 7.0 - 16.0    Glucose 122 (H) 65 - 99 mg/dL    Bun 13 8 - 22 mg/dL    Creatinine 0.83 0.50 - 1.40 mg/dL    Calcium 8.3 (L) 8.5 - 10.5 mg/dL    AST(SGOT) 45 12 - 45 U/L    ALT(SGPT) 41 2 - 50 U/L    Alkaline Phosphatase 209 (H) 30 - 99 U/L    Total Bilirubin 8.6 (H) 0.1 - 1.5 mg/dL    Albumin 3.8 3.2 - 4.9 g/dL    Total Protein 4.7 (L) 6.0 - 8.2 g/dL    Globulin 0.9 (L) 1.9 - 3.5 g/dL    A-G Ratio 4.2 g/dL    D-DIMER    Collection Time: 07/11/21  7:20 AM   Result Value Ref Range    D-Dimer Screen 0.92 (H) 0.00 - 0.50 ug/mL (FEU)   Prothrombin Time    Collection Time: 07/11/21  7:20 AM   Result Value Ref Range    PT 23.2 (H) 12.0 - 14.6 sec    INR 2.14 (H) 0.87 - 1.13   APTT    Collection Time: 07/11/21  7:20 AM   Result Value Ref Range    APTT 43.4 (H) 24.7 - 36.0 sec   FIBRINOGEN    Collection Time: 07/11/21  7:20 AM   Result Value Ref Range    Fibrinogen 103 (L) 215 - 460 mg/dL   ESTIMATED GFR    Collection Time: 07/11/21  7:20 AM   Result Value Ref Range    GFR If African American >60 >60 mL/min/1.73 m 2    GFR If Non African American >60 >60 mL/min/1.73 m 2   ESTIMATED GFR    Collection Time: 07/11/21  7:20 AM   Result Value Ref Range    GFR If African American >60 >60 mL/min/1.73 m 2    GFR If Non African American >60 >60 mL/min/1.73 m 2   CRYOPRECIPITATE    Collection Time: 07/11/21  9:44 AM   Result Value Ref Range    Component Ct       CTP                 Cryoprecipitate     [id number]   issued       07/11/21   15:28      Product Type CTP     Dispense Status issued     Unit Number (Barcoded) [id number]     Product Code (Barcoded) [id number]     Blood Type (Barcoded) 5100    Basic Metabolic Panel    Collection Time: 07/11/21 12:49 PM   Result Value Ref Range    Sodium 137 135 - 145 mmol/L    Potassium 5.0 3.6 - 5.5 mmol/L    Chloride 98 96 - 112 mmol/L    Co2 21 20 - 33 mmol/L    Glucose 111 (H) 65 - 99 mg/dL    Bun 13 8 - 22 mg/dL    Creatinine 0.89 0.50 - 1.40 mg/dL    Calcium 8.4 (L) 8.5 - 10.5 mg/dL    Anion Gap 18.0 (H) 7.0 - 16.0   CBC WITH DIFFERENTIAL    Collection Time: 07/11/21 12:49 PM   Result Value Ref Range    WBC 4.1 (L) 4.8 - 10.8 K/uL    RBC 2.48 (L) 4.20 - 5.40 M/uL    Hemoglobin 7.4 (L) 12.0 - 16.0 g/dL    Hematocrit 22.6 (L) 37.0 - 47.0 %    MCV 91.1 81.4 - 97.8 fL    MCH 29.8 27.0 - 33.0 pg    MCHC 32.7 (L) 33.6 - 35.0 g/dL    RDW 63.7 (H) 35.9 - 50.0 fL    Platelet Count 92 (L) 164 -  446 K/uL    MPV 11.9 9.0 - 12.9 fL    Neutrophils-Polys 71.70 44.00 - 72.00 %    Lymphocytes 19.10 (L) 22.00 - 41.00 %    Monocytes 8.70 0.00 - 13.40 %    Eosinophils 0.00 0.00 - 6.90 %    Basophils 0.00 0.00 - 1.80 %    Immature Granulocytes 0.50 0.00 - 0.90 %    Nucleated RBC 3.10 /100 WBC    Neutrophils (Absolute) 2.96 2.00 - 7.15 K/uL    Lymphs (Absolute) 0.79 (L) 1.00 - 4.80 K/uL    Monos (Absolute) 0.36 0.00 - 0.85 K/uL    Eos (Absolute) 0.00 0.00 - 0.51 K/uL    Baso (Absolute) 0.00 0.00 - 0.12 K/uL    Immature Granulocytes (abs) 0.02 0.00 - 0.11 K/uL    NRBC (Absolute) 0.13 K/uL   LACTIC ACID    Collection Time: 07/11/21 12:49 PM   Result Value Ref Range    Lactic Acid 6.0 (HH) 0.5 - 2.0 mmol/L   ESTIMATED GFR    Collection Time: 07/11/21 12:49 PM   Result Value Ref Range    GFR If African American >60 >60 mL/min/1.73 m 2    GFR If Non African American >60 >60 mL/min/1.73 m 2       Imaging  DX-CHEST-PORTABLE (1 VIEW)   Final Result      1.  Satisfactory appearance of the newly placed left central venous catheter projecting over the distal SVC without pneumothorax.   2.  Enlarged cardiac silhouette.   3.  Right lower lobe opacity could be atelectasis or pneumonia.      DX-SINUSES-PARANASAL COMPLETE 3+   Final Result      1.  There is minimal left maxillary mucosal thickening as was seen on the recent CT head.   2.  There is no evidence of acute sinusitis.      US-RUQ   Final Result      1.  The liver is diffusely echogenic, most compatible with fatty infiltration, however other hepatocellular disease processes are in the differential diagnosis.   2.  Gallbladder is surgically absent. There is no biliary dilatation.      CTA ABDOMEN PELVIS W & W/O POST PROCESS   Final Result      1.  No active GI hemorrhage identified      2.  Postoperative changes of the stomach transverse colon      3.  Hepatic steatosis and hepatomegaly, both severe      4.  Small amount of ascites      DX-CHEST-PORTABLE (1 VIEW)   Final  Result      1.  Hypoinflation without other evidence for acute cardiopulmonary disease.   2.  Supportive tubing is unchanged.      CT-TSPINE W/O PLUS RECONS   Final Result      1.  No acute osseous abnormality.   2.  Mild spondylosis.      CT-LSPINE W/O PLUS RECONS   Final Result      1.  No acute osseous abnormality.   2.  Postsurgical and mild degenerative changes as detailed.   3.  Hepatic steatosis.      CT-ABDOMEN-PELVIS W/O   Final Result      1.  Hepatic steatosis.   2.  New third spacing of fluid/anasarca with small amount of ascites and some generalized subcutaneous edema.   3.  Small focus of air involving the nondependent aspect of the urinary bladder which may be within the wall of the bladder or possibly within a small collapsed diverticulum. Correlate with urinalysis. Focal emphysematous cystitis cannot be excluded.   4.  Postsurgical changes as detailed.      These findings were discussed with MARIETTA ANDERSON on 7/9/2021 12:37 PM.             Assessment/Plan  * Lower GI bleed- (present on admission)  Assessment & Plan  -Resolved at this time  -Patient has received multiple blood products  -As long her vitals are stable, we will check her CBC daily for now  -Is finishing her transfusions as ordered per primary team  -Fibrinogen and TEG will be checked in AM  -Monitor respiratory status due to multiple transfusions given in short amount of time      DIC (disseminated intravascular coagulation) (HCC)- (present on admission)  Assessment & Plan  -Will trend fibrinogen level per hematology request and transfuse cryo if < 100. Patient is already receiving cryo. Will complete this bag and no further cryo will be ordered until AM labs.   -Bleeding stopped at this time. Monitor for re-bleed.   -TEG ordered     Thrombocytopenia (HCC)- (present on admission)  Assessment & Plan  -s/p platelet transfusions  -Will trend with CBC  -There is no further active bleeding at this time  -Will not transfuse the patient any more  platelets unless patient starts to rebleed or has platelet count < 10    Hemochromatosis- (present on admission)  Assessment & Plan  -Liver on CT imaging shows severe hepatomegaly  -MELD score of 23, patient is already established with GI as outpatient     Recurrent syncope- (present on admission)  Assessment & Plan  -Chronic problem due to orthostatic hypotension   -Patient also has history of seizure disorder however has not seen neurology in years  -Will obtain EEG to ensure that her syncopal episodes are not seizure related     History of pulmonary embolus (PE)- (present on admission)  Assessment & Plan  -D-dimer downtrending from previous, she has been on almost 3 months of AC  -There is no respiratory issue at this time  -Will obtain doppler of lower extremities to rule out DVT due to extensive swelling  -Given her current medical condition of lower GIB and thrombocytopenia, anticoagulation will be held due as risk of it outweighs benefits      Adrenal insufficiency (Valentín's disease) (HCC)- (present on admission)  Assessment & Plan  -Continue home dose of cortef 20mg in AM and 10mg PM. Will DC 150mg of cortef as her vitals are stable and electrolytes wnl and has no procedures planned as surgery has signed off at this time    Lactic acidemia- (present on admission)  Assessment & Plan  -Unlikely due to sepsis  -Primarily due to her liver dysfunction at this time  -Will trend 1x daily unless patient becomes hypotensive or becomes febrile     Chronic sinusitis- (present on admission)  Assessment & Plan  -ID has been following. Will continue vanc and merrem      Discussed patient condition and risk of morbidity and/or mortality with Hospitalist, RN and Patient.    The patient remains critically ill.  Critical care time = 60 minutes in directly providing and coordinating critical care and extensive data review.  No time overlap and excludes procedures.

## 2021-07-11 NOTE — CARE PLAN
Problem: Knowledge Deficit - Standard  Goal: Patient and family/care givers will demonstrate understanding of plan of care, disease process/condition, diagnostic tests and medications  Outcome: Progressing     Problem: Fall Risk  Goal: Patient will remain free from falls  Outcome: Progressing     The patient is Watcher - Medium risk of patient condition declining or worsening    Shift Goals  Clinical Goals: derease synopial event, labs w/nl    Progress made toward(s) clinical / shift goals:  Fall precautions in place. Bed in lowest position. Non-skid socks in place. Personal belongings within reach. Mobility sign on door. Bed-alarm on. Call light within reach. Pt educated regarding fall prevention and verbalized understanding. Patient educated on reasoning for central line placement.    Patient is not progressing towards the following goals:

## 2021-07-11 NOTE — PROGRESS NOTES
Daily Progress Note:     Date of Service: 7/10/2021  Primary Team: [facility] IM Blue Team   Attending: GARRY Luis M.D.   Senior Resident: Dr. Mistry  Intern: Dr. Lara  Contact:  [phone]    Chief Complaint:  Blood in stool.    ID:  Ms. Isaac is a 56 year old F with a h/o chronic sinusitis, Craighead's disease, and diverticulitis admitted for blood in stool.  Pt also c/o lower abdominal pain,  frequent falling, increased weight 40 lbs, and dizziness.      Subjective:  Pt has some tachycardia over night and an episode of seizure-like activity as evidenced by fingerstick glucose of 33.  Pt states that she recently moved from [place] due to her health status and permanently moved to [place].  No other complaints.    Interval History:  7/10--Pt had been admitted for PE on 4/27/21 identified as left lower lobe subsegmental which had completely resolved in f/u imaging in 5/2021.  Pt was on Eliquis for 3 months and since her hospital stay has refrained from taking it.  Pt has a h/o chronic sinusitis and under the care of Dr. Marcus (ID).  Pt also has a h/o Craighead's disease which is characterized by hypotension.  Pt however states that her recent dizzy spells and frequent falling are unlike her sx when she feels with her orthostatic hypotension.  Furthermore, pt states that over the past 3 days she has noted richard hematochezia.  Dr. Medrano (GI) was consulted who did not recommend colonoscopy given that pt had thrombocytopenia (plt count 70 0300 AM).  Dr. Shelton (General Surgery) was also consulted, confirmed inferior ischemic colitis which has been shown to be better treated with bowel rest and antibiotic treatment rather than emergent surgery as seen in SMA or celiac ischemic colitis.  Dr. Marcus was also consulted who recommended continuing meropenem for anaerobe coverage and vancomycin for MRSA sinusitis, and he will see the patient in the morning.  As for the patient's anasarca, pt was she was treated with 2 units  of FFP along with 100 g 25% albumin along with D5 NS fluid.    Consultants/Specialty:  General Surgery - Nikita GERMAN- Amrit    Review of Systems:    Review of Systems   Constitutional: Negative for chills and fever.        Positive for weight gain (40 lbs) over the past 2-3 months.    Positive for loss of appetite secondary to oral lesions.   HENT: Positive for sore throat (chronic MRSA sinusitis).    Eyes: Negative for blurred vision and double vision.   Respiratory: Negative for cough and hemoptysis.    Cardiovascular: Positive for leg swelling (BLE;). Negative for chest pain and palpitations.        Positive for swelling of bilateral upper extremities   Gastrointestinal: Positive for abdominal pain (lower quarants) and blood in stool. Negative for constipation, melena and vomiting.   Genitourinary: Negative for dysuria.   Musculoskeletal: Positive for falls.   Neurological: Positive for dizziness.       Objective Data:   Physical Exam:   Vitals:   Temp:  [36.2 °C (97.1 °F)-37.5 °C (99.5 °F)] 36.9 °C (98.5 °F)  Pulse:  [66-91] 73  Resp:  [16-18] 17  BP: (122-152)/(75-98) 152/95  SpO2:  [94 %-99 %] 94 %    Physical Exam  Vitals and nursing note reviewed.   Constitutional:       Appearance: She is obese. She is ill-appearing.   HENT:      Head: Normocephalic and atraumatic.      Mouth/Throat:      Pharynx: Posterior oropharyngeal erythema present.      Comments: Thrush noted in the buccal mucosa and OP.    Eyes:      General: Scleral icterus (bilateral eyes) present.      Extraocular Movements: Extraocular movements intact.      Pupils: Pupils are equal, round, and reactive to light.   Cardiovascular:      Rate and Rhythm: Normal rate and regular rhythm.      Comments: Anasarca noted with 3+-4+ pitting edema  Pulmonary:      Effort: No respiratory distress.      Breath sounds: No stridor.   Abdominal:      General: Bowel sounds are normal.      Tenderness: There is abdominal tenderness (TTP LLQ and  RLQ).   Genitourinary:     Comments: Hemoccult test deferred, but pt has been hemoccult positive upon admission as noted by admitting hospitalist.  Musculoskeletal:      Cervical back: Normal range of motion and neck supple.   Skin:     Capillary Refill: Capillary refill takes more than 3 seconds.      Comments: Large purple mass [id number] noted to the right antecubetal fossa consistent with new hematoma.   Neurological:      Mental Status: She is oriented to person, place, and time.           Labs:   Recent Results (from the past 24 hour(s))   BLOOD CULTURE    Collection Time: 07/09/21 10:29 PM    Specimen: Peripheral; Blood   Result Value Ref Range    Significant Indicator NEG     Source BLD     Site PERIPHERAL     Culture Result       No Growth  Note: Blood cultures are incubated for 5 days and  are monitored continuously.Positive blood cultures  are called to the RN and reported as soon as  they are identified.     URINALYSIS (UA)    Collection Time: 07/10/21  1:45 AM    Specimen: Urine   Result Value Ref Range    Color DK Yellow     Character Cloudy (A)     Specific Gravity 1.015 <1.035    Ph 5.0 5.0 - 8.0    Glucose Negative Negative mg/dL    Ketones Negative Negative mg/dL    Protein Negative Negative mg/dL    Bilirubin Large (A) Negative    Urobilinogen, Urine 0.2 Negative    Nitrite Positive (A) Negative    Leukocyte Esterase Trace (A) Negative    Occult Blood Negative Negative    Micro Urine Req Microscopic    CBC WITH DIFFERENTIAL    Collection Time: 07/10/21  1:45 AM   Result Value Ref Range    WBC 8.6 4.8 - 10.8 K/uL    RBC 3.70 (L) 4.20 - 5.40 M/uL    Hemoglobin 11.1 (L) 12.0 - 16.0 g/dL    Hematocrit 32.6 (L) 37.0 - 47.0 %    MCV 88.1 81.4 - 97.8 fL    MCH 30.0 27.0 - 33.0 pg    MCHC 34.0 33.6 - 35.0 g/dL    RDW 60.3 (H) 35.9 - 50.0 fL    Platelet Count 70 (L) 164 - 446 K/uL    Neutrophils-Polys 78.00 (H) 44.00 - 72.00 %    Lymphocytes 13.70 (L) 22.00 - 41.00 %    Monocytes 7.40 0.00 - 13.40 %     Eosinophils 0.00 0.00 - 6.90 %    Basophils 0.20 0.00 - 1.80 %    Immature Granulocytes 0.70 0.00 - 0.90 %    Nucleated RBC 2.60 /100 WBC    Neutrophils (Absolute) 6.69 2.00 - 7.15 K/uL    Lymphs (Absolute) 1.17 1.00 - 4.80 K/uL    Monos (Absolute) 0.63 0.00 - 0.85 K/uL    Eos (Absolute) 0.00 0.00 - 0.51 K/uL    Baso (Absolute) 0.02 0.00 - 0.12 K/uL    Immature Granulocytes (abs) 0.06 0.00 - 0.11 K/uL    NRBC (Absolute) 0.22 K/uL   Comp Metabolic Panel (CMP)    Collection Time: 07/10/21  1:45 AM   Result Value Ref Range    Sodium 132 (L) 135 - 145 mmol/L    Potassium 5.7 (H) 3.6 - 5.5 mmol/L    Chloride 95 (L) 96 - 112 mmol/L    Co2 21 20 - 33 mmol/L    Anion Gap 16.0 7.0 - 16.0    Glucose 112 (H) 65 - 99 mg/dL    Bun 13 8 - 22 mg/dL    Creatinine 0.97 0.50 - 1.40 mg/dL    Calcium 7.2 (L) 8.5 - 10.5 mg/dL    AST(SGOT) 95 (H) 12 - 45 U/L    ALT(SGPT) 79 (H) 2 - 50 U/L    Alkaline Phosphatase 322 (H) 30 - 99 U/L    Total Bilirubin 7.9 (H) 0.1 - 1.5 mg/dL    Albumin 2.0 (L) 3.2 - 4.9 g/dL    Total Protein 3.5 (L) 6.0 - 8.2 g/dL    Globulin 1.5 (L) 1.9 - 3.5 g/dL    A-G Ratio 1.3 g/dL   LACTIC ACID    Collection Time: 07/10/21  1:45 AM   Result Value Ref Range    Lactic Acid 7.6 (HH) 0.5 - 2.0 mmol/L   URINE MICROSCOPIC (W/UA)    Collection Time: 07/10/21  1:45 AM   Result Value Ref Range    WBC 0-2 /hpf    RBC 0-2 /hpf    Bacteria Moderate (A) None /hpf    Epithelial Cells Negative /hpf    Hyaline Cast 0-2 /lpf   ESTIMATED GFR    Collection Time: 07/10/21  1:45 AM   Result Value Ref Range    GFR If African American >60 >60 mL/min/1.73 m 2    GFR If Non African American 59 (A) >60 mL/min/1.73 m 2   POCT glucose device results    Collection Time: 07/10/21  2:54 AM   Result Value Ref Range    Glucose - Accu-Ck 121 (H) 65 - 99 mg/dL   POTASSIUM SERUM (K)    Collection Time: 07/10/21  5:15 AM   Result Value Ref Range    Potassium 5.6 (H) 3.6 - 5.5 mmol/L   LACTIC ACID    Collection Time: 07/10/21  5:15 AM   Result Value Ref  Range    Lactic Acid 8.0 (HH) 0.5 - 2.0 mmol/L   POCT glucose device results    Collection Time: 07/10/21  5:23 AM   Result Value Ref Range    Glucose - Accu-Ck 125 (H) 65 - 99 mg/dL   CBC WITH DIFFERENTIAL    Collection Time: 07/10/21  9:13 AM   Result Value Ref Range    WBC 8.8 4.8 - 10.8 K/uL    RBC 3.83 (L) 4.20 - 5.40 M/uL    Hemoglobin 11.4 (L) 12.0 - 16.0 g/dL    Hematocrit 34.3 (L) 37.0 - 47.0 %    MCV 89.6 81.4 - 97.8 fL    MCH 29.8 27.0 - 33.0 pg    MCHC 33.2 (L) 33.6 - 35.0 g/dL    RDW 60.1 (H) 35.9 - 50.0 fL    Platelet Count 65 (L) 164 - 446 K/uL    MPV 12.6 9.0 - 12.9 fL    Neutrophils-Polys 71.70 44.00 - 72.00 %    Lymphocytes 15.10 (L) 22.00 - 41.00 %    Monocytes 9.70 0.00 - 13.40 %    Eosinophils 2.40 0.00 - 6.90 %    Basophils 0.20 0.00 - 1.80 %    Immature Granulocytes 0.90 0.00 - 0.90 %    Nucleated RBC 1.90 /100 WBC    Neutrophils (Absolute) 6.27 2.00 - 7.15 K/uL    Lymphs (Absolute) 1.32 1.00 - 4.80 K/uL    Monos (Absolute) 0.85 0.00 - 0.85 K/uL    Eos (Absolute) 0.21 0.00 - 0.51 K/uL    Baso (Absolute) 0.02 0.00 - 0.12 K/uL    Immature Granulocytes (abs) 0.08 0.00 - 0.11 K/uL    NRBC (Absolute) 0.17 K/uL   LACTIC ACID    Collection Time: 07/10/21  9:13 AM   Result Value Ref Range    Lactic Acid 7.4 (HH) 0.5 - 2.0 mmol/L   LACTIC ACID    Collection Time: 07/10/21 11:17 AM   Result Value Ref Range    Lactic Acid 6.1 (HH) 0.5 - 2.0 mmol/L   Comp Metabolic Panel    Collection Time: 07/10/21 11:17 AM   Result Value Ref Range    Sodium 132 (L) 135 - 145 mmol/L    Potassium 5.3 3.6 - 5.5 mmol/L    Chloride 96 96 - 112 mmol/L    Co2 22 20 - 33 mmol/L    Anion Gap 14.0 7.0 - 16.0    Glucose 108 (H) 65 - 99 mg/dL    Bun 15 8 - 22 mg/dL    Creatinine 1.18 0.50 - 1.40 mg/dL    Calcium 7.0 (L) 8.5 - 10.5 mg/dL    AST(SGOT) 74 (H) 12 - 45 U/L    ALT(SGPT) 64 (H) 2 - 50 U/L    Alkaline Phosphatase 293 (H) 30 - 99 U/L    Total Bilirubin 7.1 (H) 0.1 - 1.5 mg/dL    Albumin 1.7 (L) 3.2 - 4.9 g/dL    Total  Protein 3.0 (L) 6.0 - 8.2 g/dL    Globulin 1.3 (L) 1.9 - 3.5 g/dL    A-G Ratio 1.3 g/dL   MAGNESIUM    Collection Time: 07/10/21 11:17 AM   Result Value Ref Range    Magnesium 1.7 1.5 - 2.5 mg/dL   PHOSPHORUS    Collection Time: 07/10/21 11:17 AM   Result Value Ref Range    Phosphorus 4.8 (H) 2.5 - 4.5 mg/dL   HEMOGLOBIN AND HEMATOCRIT    Collection Time: 07/10/21 11:17 AM   Result Value Ref Range    Hemoglobin 10.1 (L) 12.0 - 16.0 g/dL    Hematocrit 29.6 (L) 37.0 - 47.0 %   BILIRUBIN DIRECT    Collection Time: 07/10/21 11:17 AM   Result Value Ref Range    Direct Bilirubin 6.3 (H) 0.1 - 0.5 mg/dL   BILIRUBIN INDIRECT    Collection Time: 07/10/21 11:17 AM   Result Value Ref Range    Indirect Bilirubin 0.8 0.0 - 1.0 mg/dL   LDH    Collection Time: 07/10/21 11:17 AM   Result Value Ref Range    LDH Total 297 (H) 107 - 266 U/L   CRP QUANTITIVE (NON-CARDIAC)    Collection Time: 07/10/21 11:17 AM   Result Value Ref Range    Stat C-Reactive Protein 5.68 (H) 0.00 - 0.75 mg/dL   RHEUMATOID ARTHRITIS FACTOR    Collection Time: 07/10/21 11:17 AM   Result Value Ref Range    Rheumatoid Factor -Neph- <10 0 - 14 IU/mL   LIPASE    Collection Time: 07/10/21 11:17 AM   Result Value Ref Range    Lipase 52 11 - 82 U/L   Prothrombin Time    Collection Time: 07/10/21 11:17 AM   Result Value Ref Range    PT 23.9 (H) 12.0 - 14.6 sec    INR 2.22 (H) 0.87 - 1.13   APTT    Collection Time: 07/10/21 11:17 AM   Result Value Ref Range    APTT 51.1 (H) 24.7 - 36.0 sec   D-DIMER    Collection Time: 07/10/21 11:17 AM   Result Value Ref Range    D-Dimer Screen 0.66 (H) 0.00 - 0.50 ug/mL (FEU)   FIBRINOGEN    Collection Time: 07/10/21 11:17 AM   Result Value Ref Range    Fibrinogen 111 (L) 215 - 460 mg/dL   Sed Rate    Collection Time: 07/10/21 11:17 AM   Result Value Ref Range    Sed Rate Westergren 6 0 - 25 mm/hour   ESTIMATED GFR    Collection Time: 07/10/21 11:17 AM   Result Value Ref Range    GFR If  57 (A) >60 mL/min/1.73 m 2     GFR If Non  47 (A) >60 mL/min/1.73 m 2   FRESH FROZEN PLASMA    Collection Time: 07/10/21  3:46 PM   Result Value Ref Range    Component F       TA                  Thawed Plasma       [id number]   transfused   07/10/21   16:26      Product Type Thawed Apheresis Plasma     Dispense Status transfused     Unit Number (Barcoded) [id number]     Product Code (Barcoded) B2473W33     Blood Type (Barcoded) 5100    LACTIC ACID    Collection Time: 07/10/21  5:06 PM   Result Value Ref Range    Lactic Acid 7.3 (HH) 0.5 - 2.0 mmol/L       Imaging:   Independant Imaging Review: Completed  US-RUQ   Final Result      1.  The liver is diffusely echogenic, most compatible with fatty infiltration, however other hepatocellular disease processes are in the differential diagnosis.   2.  Gallbladder is surgically absent. There is no biliary dilatation.      CTA ABDOMEN PELVIS W & W/O POST PROCESS   Final Result      1.  No active GI hemorrhage identified      2.  Postoperative changes of the stomach transverse colon      3.  Hepatic steatosis and hepatomegaly, both severe      4.  Small amount of ascites      DX-CHEST-PORTABLE (1 VIEW)   Final Result      1.  Hypoinflation without other evidence for acute cardiopulmonary disease.   2.  Supportive tubing is unchanged.      CT-TSPINE W/O PLUS RECONS   Final Result      1.  No acute osseous abnormality.   2.  Mild spondylosis.      CT-LSPINE W/O PLUS RECONS   Final Result      1.  No acute osseous abnormality.   2.  Postsurgical and mild degenerative changes as detailed.   3.  Hepatic steatosis.      CT-ABDOMEN-PELVIS W/O   Final Result      1.  Hepatic steatosis.   2.  New third spacing of fluid/anasarca with small amount of ascites and some generalized subcutaneous edema.   3.  Small focus of air involving the nondependent aspect of the urinary bladder which may be within the wall of the bladder or possibly within a small collapsed diverticulum. Correlate with  urinalysis. Focal emphysematous cystitis cannot be excluded.   4.  Postsurgical changes as detailed.      These findings were discussed with MARIETTA ANDERSON on 7/9/2021 12:37 PM.             Problem Representation:        * Thrombocytopenia (HCC)- (present on admission)  Assessment & Plan  Worsening over the last few days.  No clear etiology but could be ITP (WBCs and RBCs did not decline as platelets) versus TTP  Hematology consulted Dr. Gastelum  Recommended 1 units of platelets.  INR above 1.5, will give 1 dose of vitamin K in the light of lower GI bleed.  Take ordered and pending.  Consider FFP if patient continues to bleed after take resulted.  Monitor CBC every 8.    Incontinence- (present on admission)  Assessment & Plan  Complain of both urinary and fecal incontinence which is new in the setting of lower back pain and numbness across the lower back area that started 3 months ago after a fall.  We will check thoracic and lumbar spine CT.  Fall precautions.      Thrush- (present on admission)  Assessment & Plan  Started on nystatin oral solution swish and swallow.  If no improvement clinically, could consider to add Diflucan.    Severe protein-calorie malnutrition (HCC)- (present on admission)  Assessment & Plan  Patient has been having pain and burning in her buccal mucosa and throat and her p.o. intake has been almost 0 over the last few days.  Thrush was noted on exam.  We will treat.  Encourage supplements between meals.  Encourage p.o. intake  Dietitian consulted.    Electrolyte imbalance- (present on admission)  Assessment & Plan  Hyponatremia likely due to dehydration.  Judicial IV fluids considering chronic peripheral edema.    Epistaxis- (present on admission)  Assessment & Plan  Suspecting due to nonhumidified oxygen through nose in the setting of thrombocytopenia.  Now resolved.  Consider humidified oxygen if needed while inpatient.    Lower GI bleed- (present on admission)  Assessment & Plan  Likely  diverticular bleed in the light of thrombocytopenia and being on Eliquis per  Bradycardia has resolved with last bowel movement this a.m. was nonbloody.  H&H stable.  Continue to monitor H&H.  Transfuse if hemoglobin drops below 7 g/dL.    Hemochromatosis- (present on admission)  Assessment & Plan  History of PE  Follow-up with gastroenterology as an outpatient.    Now with worsening LFTs and increasing bilirubin likely due to starvation.  Continue to monitor for now.  Avoid hepatotoxic medications.    Recurrent syncope  Assessment & Plan  Chronic for few years now was attributed to adrenal insufficiency.  Has been more frequent lately.  Start on stress dose Solu-Cortef as mentioned above.  Continue home dose midodrine.  Gentle IV fluid resuscitation.  Fall precautions.    History of pulmonary embolus (PE)  Assessment & Plan  After COVID-19 vaccination as per patient?.  CTA of chest done on 4/27/2021 showed small left lower lobe subsegmental PE.  However repeat CTA on 5/10/2021 showed resolution.  Patient supposed to be on Eliquis for 3-month.  However, will hold for now due to thrombocytopenia and lower GI bleed.    Adrenal insufficiency (Valentín's disease) (HCC)- (present on admission)  Assessment & Plan  History of.  Normally on Cortef oral.  We will start stress dose Solu-Cortef considering patient has been having frequent syncopal episodes lately for now and then gradually wean down back to home dose oral Cortef.      Chronic sinusitis  Assessment & Plan  History of chronic MRSA sinusitis.  On Xerava per ID recommendations.  We will continue.  MRSA PCR pending.        Tubes: pIV x2  Fluids: D5 NS  Diet: NPO - bowel rest.  DVT prophylaxis: SCD  Antibiotics: Meropenem and Vancomycin  Code Status: Full  Disposition: Stable

## 2021-07-11 NOTE — CONSULTS
REASON FOR CONSULTATION:  GI bleed.     HISTORY OF PRESENT ILLNESS:  The patient is a pleasant 56-year-old female who   has a history of hemochromatosis and fatty liver.  The patient is followed by   our group as an outpatient.  The patient comes in with severe thrombocytopenia   with platelets less than 20,000 as well as hematochezia.  The patient also   was found to have worsening liver function tests that were normal quite   recently.  The patient has had a lactic acidosis, had been hypotensive and she   is currently being treated by ENT for a sinus infection.  The patient also   reports significant epistaxis.  The patient has had some lower abdominal pain.    The patient reports her last colonoscopy was 6 months ago and reports it was   normal.  The patient denies any current nausea, vomiting, fever, chills,   chest pain or shortness of breath.     PAST MEDICAL HISTORY:  Fatty liver, hemochromatosis, diverticulosis,   hypertension, hypothyroid, dyslipidemia, coronary artery disease, congestive   heart failure, GERD, seizure disorder, adrenal insufficiency, TBI.     PAST SURGICAL HISTORY:  Hysterectomy, gastric bypass surgery, abdominal   exploration, sinus cyst surgery, mandible fracture, right foot surgery, right   ankle, appendectomy, left clavicle, left shoulder.     ALLERGIES:  ANCEF, BACTRIM, BUPRENORPHINE, CLINDAMYCIN, IV CONTRAST,   DOXYCYCLINE, ECONAZOLE,  FLAGYL, TYGACIL, _____, LEVAQUIN, GADOLINIUM,   HYDROCODONE, MORPHINE, CODEINE, OXYCODONE, TRAMADOL, NITROFURANTOIN, KEFLEX,   NYQUIL, PARICALCITOL, PENICILLIN, TAPE, AZITHROMYCIN, BEXTRA, LINEZOLID.     FAMILY HISTORY:  Noncontributory.     SOCIAL HISTORY:  The patient denies tobacco use.  Rarely drinks alcohol.  The   patient denies illicit drug use.     REVIEW OF SYSTEMS:  A 14-point review of systems was obtained and found to be   negative except for those mentioned above in the HPI as well as chronic   diarrhea, thrush, dysphagia and  migraines.     PHYSICAL EXAMINATION:    GENERAL:  No acute distress, but the patient does appear chronically ill.  VITAL SIGNS:  Stable.  HEENT:  PERRLA.  Extraocular movements are intact.  Sclerae positively   icteric.  HEART:  Regular.  LUNGS:  Clear.  ABDOMEN:  Obese, soft, but tender in the lower abdomen to deep palpation.  No   guarding or rebound.  MUSCULOSKELETAL:  Edema noted, bilateral lower extremities.  NEUROLOGIC:  Grossly intact.  PSYCHIATRIC:  Normal affect.  SKIN:  Positive for jaundice and positive for pallor.     LABORATORY DATA:  Hemoglobin 10.1, hematocrit 29.6. AST 74, ALT 64, alkaline   phosphatase 293, total bilirubin 7.1, direct 6.3, indirect 0.8, albumin 1.7,   lactic acid again 6.1.  INR 2.22.  Platelets 65.  ESR 6.     IMAGING:  CTA of the abdomen and pelvis shows no acute GI hemorrhage   identified.  Postoperative changes of the stomach and transverse colon,   hepatic steatosis and hepatomegaly, both severe; small amount of ascites.    Abdominal ultrasound of right upper quadrant shows liver is diffusely   echogenic, mostly compatible with fatty infiltration; however, other   hepatocellular disease processes are in the differential diagnosis.    Gallbladder is surgically absent.  There is no biliary dilatation.     ASSESSMENT AND PLAN:   1.  Abnormal liver enzymes.  I suspect this is a component of ischemic   hepatopathy.  I suspect as the patient's blood pressure is normalized, the   lactic acidosis is improved and the LFTs will follow behind that.  We will   follow along closely for now.  There are no signs of any biliary obstruction.    There is no sign of any biliary dilatation at this time.  The patient had   normal liver enzymes as recently as less than 3 weeks ago.  2.  Gastrointestinal bleed.  Suspect a component of ischemic colitis versus   diverticular versus hemorrhoidal bleeding with the patient having elevated   lactic acid and lower abdominal pain and hematochezia.  Ischemic  colitis would   be higher on my differential than the others.  However, at this point, no   need for any acute intervention, especially with this profound   thrombocytopenia and a relatively normal colonoscopy in the past 6 months and   no current life-threatening bleeding.  Recommend conservative management for   now focused on improving the patient's perfusion, decreasing the patient's   lactic acidosis as well as maintaining hemoglobin.  At this point, no need for   any transfusion.  The patient will certainly need her platelet disorder   evaluated and after speaking to the patient and nurse, it appears that   hematology has already seen the patient.  The patient was given a platelet   transfusion and they have improved to approximately 65,000.  Recommend holding   blood thinners for now.  3.  Lactic acidosis.  Defer to primary team.  I suspect as this is resolved   and perfusion has improved, the patient's liver enzymes as well as her likely   ischemic colitis would both resolve.  4.  Hematochezia, see above.  Likely ischemic colitis.  See above for   discussion.  No plan for colonoscopy at this time.  We will follow for now.  5.  Thrombocytopenia.  Defer to hematology, who is evaluating the patient for   this.  6.  GI bleed, see above.  No need for transfusion as of yet.  Platelets have   been addressed.  7.  Fatty liver.  This is unlikely the cause of the patient's current   symptoms.  The patient had normal liver function tests less than 3 weeks ago.    I suspect the patient's abnormal liver enzymes are related to ischemic   hepatopathy.  Continue to trend.  8.  We will follow along for now.     Please call with any questions or concerns.        ______________________________  DO BALJINDER Rose/WINSOME/SMITA    DD:  07/10/2021 15:54  DT:  07/10/2021 19:25    Job#:  [id number]
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
## 2021-07-11 NOTE — PROGRESS NOTES
Surgery General Daily Progress Note    Date of Service  7/11/2021    Chief Complaint  Donna Isaac is a 56 y.o. female admitted 7/9/2021 with multiple medical problems, recent concern for ischemic colitis. Lower GI bleed resolved, diarrhea resolved, abdominal pain significantly better. Primary team asked for central line because of her anasarca and she keeps losing IVs    Code Status  Full Code      Review of Systems  ROS     Physical Exam  Temp:  [35.7 °C (96.3 °F)-36.9 °C (98.5 °F)] 35.8 °C (96.4 °F)  Pulse:  [61-90] 63  Resp:  [16-18] 17  BP: (116-167)/() 153/108  SpO2:  [17 %-99 %] 17 %    Physical Exam  NAD, appears comfortable  Neck supple, obese and short  Regular heart rate  Normal respiratory effort  Abdomen soft, NT, ND  Ext ROM grossly intact  Skin pink and warm      Fluids    Intake/Output Summary (Last 24 hours) at 7/11/2021 1611  Last data filed at 7/11/2021 0600  Gross per 24 hour   Intake 374 ml   Output 800 ml   Net -426 ml       Laboratory  Recent Labs     07/09/21  1510 07/10/21  0145 07/10/21  0913 07/10/21  1117 07/11/21  0130 07/11/21  0720 07/11/21  1249   WBC 7.6   < > 8.8   < > 4.1* 3.8* 4.1*   RBC 3.63*   < > 3.83*   < > 2.54* 2.63* 2.48*   HEMOGLOBIN 11.0*   < > 11.4*   < > 7.5* 8.0* 7.4*   HEMATOCRIT 32.3*   < > 34.3*   < > 23.0* 24.2* 22.6*   MCV 89.0   < > 89.6   < > 90.6 92.0 91.1   MCH 30.3   < > 29.8   < > 29.5 30.4 29.8   MCHC 34.1   < > 33.2*   < > 32.6* 33.1* 32.7*   RDW 59.4*   < > 60.1*   < > 61.4* 62.7* 63.7*   PLATELETCT 72*   < > 65*   < > 24* 26* 92*   MPV 13.1*  --  12.6  --   --   --  11.9    < > = values in this interval not displayed.     Recent Labs     07/11/21  0130 07/11/21  0720 07/11/21  1249   SODIUM 139 140  137 137   POTASSIUM 5.0 5.2  5.0 5.0   CHLORIDE 100 101  99 98   CO2 23 22  22 21   GLUCOSE 144* 122*  118* 111*   BUN 14 13  13 13   CREATININE 0.98 0.83  0.82 0.89   CALCIUM 8.0* 8.3*  8.3* 8.4*     Recent Labs     07/09/21  1129  07/10/21  1117 07/11/21  0720   APTT 52.3* 51.1* 43.4*   INR 2.49* 2.22* 2.14*                   Assessment/Plan  55 y/o female with multiple medical problems, recent coagulopathic bleed and hypotension with resultant ischemic hepatopathy and ischemic colitis, MARY LOU resolved  1. Appreciate medical care  2. Little concern for ischemic colitits now diarrhea, GO bleed and abdominal pain have all resolved  3. Left IJ central line placed under ultrasound guidance  4. CXR  5. General surgery signs off, please reconsult with any further questions

## 2021-07-11 NOTE — PROCEDURES
Preop diagnosis: anasarca, need for IV access  Post op dx: same  Procedure: ultrasound guided placement of left IJ triple lumen central line  Surgeon: Luis Shelton MD  EBL: 10cc  Complications: none observed  DETAILS OF PROCEDURE:  Left neck prepped and draped in usual sterile fashion. Ultra sound guidance to locate left IJ. Identified with finder needle. Larger needle advanced into IJ with dark venous outflow. Wire passed without resistance. Dilator advanced, triple lumen catheter advanced over wire and secured with 3 sutures. All ports withdrawn and flushed, caps added. Patient without respiratory distress. Sterile dressing applied.

## 2021-07-11 NOTE — PROGRESS NOTES
Daily Progress Note:     Date of Service: 7/11/2021  Primary Team: [facility] Blue Team   Attending: GARRY Luis M.D.   Senior Resident: Dr. Mistry  Intern: Dr. Lara  Contact:  [phone]    Chief Complaint:  Blood in stool.    ID:  Ms. Isaac is a 56 year old F with a h/o chronic sinusitis, [place]'s disease, and diverticulitis admitted for blood in stool.  Pt also c/o lower abdominal pain,  frequent falling, increased weight 40 lbs, and dizziness.      Subjective:  Pt had multiple witnessed syncopal events over night by both myself and nursing staff.      Interval History:  7/11--Pt had DIC panel performed.  Morning labs resulted in low plt count of 70.  2 unit cryoprecipitate and pRBCx2 were ordered.  Direct David test ordered for testing for potential hemolytic anemia.  Pt continues to have high lactic acid and given D5 NS.      Consultants/Specialty:  General Surgery - Nikita GERMAN- Amrit    Review of Systems:    Review of Systems   Constitutional: Positive for malaise/fatigue. Negative for chills and fever.        Positive for weight gain (40 lbs) over the past 2-3 months.    Positive for loss of appetite secondary to oral lesions.   HENT: Positive for sore throat (chronic MRSA sinusitis).    Eyes: Negative for blurred vision and double vision.   Respiratory: Negative for cough and hemoptysis.    Cardiovascular: Positive for leg swelling (BLE;). Negative for chest pain and palpitations.        Positive for swelling of bilateral upper extremities   Gastrointestinal: Positive for abdominal pain (lower quarants) and blood in stool. Negative for constipation, melena and vomiting.   Genitourinary: Negative for dysuria.   Musculoskeletal: Positive for falls.   Neurological: Positive for dizziness.       Objective Data:   Physical Exam:   Vitals:   Temp:  [35.7 °C (96.3 °F)-36.9 °C (98.5 °F)] 35.7 °C (96.3 °F)  Pulse:  [61-90] 62  Resp:  [16-18] 16  BP: (116-167)/() 167/101  SpO2:  [91  %-99 %] 95 %    Physical Exam  Vitals and nursing note reviewed.   Constitutional:       Appearance: She is obese. She is ill-appearing.   HENT:      Head: Normocephalic and atraumatic.      Mouth/Throat:      Pharynx: Posterior oropharyngeal erythema present.      Comments: Thrush noted in the buccal mucosa and OP.    Eyes:      General: Scleral icterus (bilateral eyes) present.      Extraocular Movements: Extraocular movements intact.      Pupils: Pupils are equal, round, and reactive to light.   Cardiovascular:      Rate and Rhythm: Normal rate and regular rhythm.      Comments: Anasarca noted with 3+-4+ pitting edema  Pulmonary:      Effort: No respiratory distress.      Breath sounds: No stridor.   Abdominal:      General: Bowel sounds are normal.      Tenderness: There is abdominal tenderness (TTP LLQ and RLQ).   Genitourinary:     Comments: Hemoccult test deferred, but pt has been hemoccult positive upon admission as noted by admitting hospitalist.  Musculoskeletal:      Cervical back: Normal range of motion and neck supple.   Skin:     Capillary Refill: Capillary refill takes more than 3 seconds.      Comments: Large purple mass [id number] noted to the right antecubetal fossa consistent with new hematoma.   Neurological:      Mental Status: She is oriented to person, place, and time.           Labs:   Recent Results (from the past 24 hour(s))   FRESH FROZEN PLASMA    Collection Time: 07/10/21  3:46 PM   Result Value Ref Range    Component F       TA                  Thawed Plasma       [id number]   transfused   07/10/21   16:26      Product Type Thawed Apheresis Plasma     Dispense Status transfused     Unit Number (Barcoded) [id number]     Product Code (Barcoded) F2063O34     Blood Type (Barcoded) 5100     Component F       TA                  Thawed Plasma       [id number]   issued       07/11/21   03:27      Product Type Thawed Apheresis Plasma     Dispense Status issued     Unit Number (Barcoded)  [id number]     Product Code (Barcoded) Z0878L07     Blood Type (Barcoded) 5100    LACTIC ACID    Collection Time: 07/10/21  5:06 PM   Result Value Ref Range    Lactic Acid 7.3 (HH) 0.5 - 2.0 mmol/L   Basic Metabolic Panel    Collection Time: 07/10/21 10:25 PM   Result Value Ref Range    Sodium 138 135 - 145 mmol/L    Potassium 5.0 3.6 - 5.5 mmol/L    Chloride 100 96 - 112 mmol/L    Co2 23 20 - 33 mmol/L    Glucose 159 (H) 65 - 99 mg/dL    Bun 14 8 - 22 mg/dL    Creatinine 1.02 0.50 - 1.40 mg/dL    Calcium 8.0 (L) 8.5 - 10.5 mg/dL    Anion Gap 15.0 7.0 - 16.0   LACTIC ACID    Collection Time: 07/10/21 10:25 PM   Result Value Ref Range    Lactic Acid 5.9 (HH) 0.5 - 2.0 mmol/L   ESTIMATED GFR    Collection Time: 07/10/21 10:25 PM   Result Value Ref Range    GFR If African American >60 >60 mL/min/1.73 m 2    GFR If Non  56 (A) >60 mL/min/1.73 m 2   CBC WITH DIFFERENTIAL    Collection Time: 07/11/21  1:30 AM   Result Value Ref Range    WBC 4.1 (L) 4.8 - 10.8 K/uL    RBC 2.54 (L) 4.20 - 5.40 M/uL    Hemoglobin 7.5 (L) 12.0 - 16.0 g/dL    Hematocrit 23.0 (L) 37.0 - 47.0 %    MCV 90.6 81.4 - 97.8 fL    MCH 29.5 27.0 - 33.0 pg    MCHC 32.6 (L) 33.6 - 35.0 g/dL    RDW 61.4 (H) 35.9 - 50.0 fL    Platelet Count 24 (LL) 164 - 446 K/uL    Neutrophils-Polys 73.60 (H) 44.00 - 72.00 %    Lymphocytes 16.50 (L) 22.00 - 41.00 %    Monocytes 9.20 0.00 - 13.40 %    Eosinophils 0.00 0.00 - 6.90 %    Basophils 0.00 0.00 - 1.80 %    Immature Granulocytes 0.70 0.00 - 0.90 %    Nucleated RBC 1.00 /100 WBC    Neutrophils (Absolute) 3.02 2.00 - 7.15 K/uL    Lymphs (Absolute) 0.68 (L) 1.00 - 4.80 K/uL    Monos (Absolute) 0.38 0.00 - 0.85 K/uL    Eos (Absolute) 0.00 0.00 - 0.51 K/uL    Baso (Absolute) 0.00 0.00 - 0.12 K/uL    Immature Granulocytes (abs) 0.03 0.00 - 0.11 K/uL    NRBC (Absolute) 0.04 K/uL    Hypochromia 1+     Anisocytosis 1+     Microcytosis 1+    LACTIC ACID    Collection Time: 07/11/21  1:30 AM   Result Value  Ref Range    Lactic Acid 6.5 (HH) 0.5 - 2.0 mmol/L   PHOSPHORUS    Collection Time: 07/11/21  1:30 AM   Result Value Ref Range    Phosphorus 4.8 (H) 2.5 - 4.5 mg/dL   MAGNESIUM    Collection Time: 07/11/21  1:30 AM   Result Value Ref Range    Magnesium 1.9 1.5 - 2.5 mg/dL   Basic Metabolic Panel    Collection Time: 07/11/21  1:30 AM   Result Value Ref Range    Sodium 139 135 - 145 mmol/L    Potassium 5.0 3.6 - 5.5 mmol/L    Chloride 100 96 - 112 mmol/L    Co2 23 20 - 33 mmol/L    Glucose 144 (H) 65 - 99 mg/dL    Bun 14 8 - 22 mg/dL    Creatinine 0.98 0.50 - 1.40 mg/dL    Calcium 8.0 (L) 8.5 - 10.5 mg/dL    Anion Gap 16.0 7.0 - 16.0   ESTIMATED GFR    Collection Time: 07/11/21  1:30 AM   Result Value Ref Range    GFR If African American >60 >60 mL/min/1.73 m 2    GFR If Non African American 58 (A) >60 mL/min/1.73 m 2   PERIPHERAL SMEAR REVIEW    Collection Time: 07/11/21  1:30 AM   Result Value Ref Range    Peripheral Smear Review see below    PLATELET ESTIMATE    Collection Time: 07/11/21  1:30 AM   Result Value Ref Range    Plt Estimation Marked Decrease    MORPHOLOGY    Collection Time: 07/11/21  1:30 AM   Result Value Ref Range    RBC Morphology Present     Poikilocytosis 1+     Ovalocytes 1+     Target Cells 1+    IMMATURE PLT FRACTION    Collection Time: 07/11/21  1:30 AM   Result Value Ref Range    Imm. Plt Fraction 19.7 (H) 0.6 - 13.1 K/uL   DIFFERENTIAL COMMENT    Collection Time: 07/11/21  1:30 AM   Result Value Ref Range    Comments-Diff see below    Basic Metabolic Panel    Collection Time: 07/11/21  7:20 AM   Result Value Ref Range    Sodium 137 135 - 145 mmol/L    Potassium 5.0 3.6 - 5.5 mmol/L    Chloride 99 96 - 112 mmol/L    Co2 22 20 - 33 mmol/L    Glucose 118 (H) 65 - 99 mg/dL    Bun 13 8 - 22 mg/dL    Creatinine 0.82 0.50 - 1.40 mg/dL    Calcium 8.3 (L) 8.5 - 10.5 mg/dL    Anion Gap 16.0 7.0 - 16.0   CBC WITH DIFFERENTIAL    Collection Time: 07/11/21  7:20 AM   Result Value Ref Range    WBC 3.8  (L) 4.8 - 10.8 K/uL    RBC 2.63 (L) 4.20 - 5.40 M/uL    Hemoglobin 8.0 (L) 12.0 - 16.0 g/dL    Hematocrit 24.2 (L) 37.0 - 47.0 %    MCV 92.0 81.4 - 97.8 fL    MCH 30.4 27.0 - 33.0 pg    MCHC 33.1 (L) 33.6 - 35.0 g/dL    RDW 62.7 (H) 35.9 - 50.0 fL    Platelet Count 26 (LL) 164 - 446 K/uL    Neutrophils-Polys 71.10 44.00 - 72.00 %    Lymphocytes 19.60 (L) 22.00 - 41.00 %    Monocytes 8.50 0.00 - 13.40 %    Eosinophils 0.00 0.00 - 6.90 %    Basophils 0.30 0.00 - 1.80 %    Immature Granulocytes 0.50 0.00 - 0.90 %    Nucleated RBC 2.10 /100 WBC    Neutrophils (Absolute) 2.68 2.00 - 7.15 K/uL    Lymphs (Absolute) 0.74 (L) 1.00 - 4.80 K/uL    Monos (Absolute) 0.32 0.00 - 0.85 K/uL    Eos (Absolute) 0.00 0.00 - 0.51 K/uL    Baso (Absolute) 0.01 0.00 - 0.12 K/uL    Immature Granulocytes (abs) 0.02 0.00 - 0.11 K/uL    NRBC (Absolute) 0.08 K/uL   Comp Metabolic Panel    Collection Time: 07/11/21  7:20 AM   Result Value Ref Range    Sodium 140 135 - 145 mmol/L    Potassium 5.2 3.6 - 5.5 mmol/L    Chloride 101 96 - 112 mmol/L    Co2 22 20 - 33 mmol/L    Anion Gap 17.0 (H) 7.0 - 16.0    Glucose 122 (H) 65 - 99 mg/dL    Bun 13 8 - 22 mg/dL    Creatinine 0.83 0.50 - 1.40 mg/dL    Calcium 8.3 (L) 8.5 - 10.5 mg/dL    AST(SGOT) 45 12 - 45 U/L    ALT(SGPT) 41 2 - 50 U/L    Alkaline Phosphatase 209 (H) 30 - 99 U/L    Total Bilirubin 8.6 (H) 0.1 - 1.5 mg/dL    Albumin 3.8 3.2 - 4.9 g/dL    Total Protein 4.7 (L) 6.0 - 8.2 g/dL    Globulin 0.9 (L) 1.9 - 3.5 g/dL    A-G Ratio 4.2 g/dL   D-DIMER    Collection Time: 07/11/21  7:20 AM   Result Value Ref Range    D-Dimer Screen 0.92 (H) 0.00 - 0.50 ug/mL (FEU)   Prothrombin Time    Collection Time: 07/11/21  7:20 AM   Result Value Ref Range    PT 23.2 (H) 12.0 - 14.6 sec    INR 2.14 (H) 0.87 - 1.13   APTT    Collection Time: 07/11/21  7:20 AM   Result Value Ref Range    APTT 43.4 (H) 24.7 - 36.0 sec   FIBRINOGEN    Collection Time: 07/11/21  7:20 AM   Result Value Ref Range    Fibrinogen 103  (L) 215 - 460 mg/dL   ESTIMATED GFR    Collection Time: 07/11/21  7:20 AM   Result Value Ref Range    GFR If African American >60 >60 mL/min/1.73 m 2    GFR If Non African American >60 >60 mL/min/1.73 m 2   ESTIMATED GFR    Collection Time: 07/11/21  7:20 AM   Result Value Ref Range    GFR If African American >60 >60 mL/min/1.73 m 2    GFR If Non African American >60 >60 mL/min/1.73 m 2   CRYOPRECIPITATE    Collection Time: 07/11/21  9:44 AM   Result Value Ref Range    Component Ct       CTP                 Cryoprecipitate     [id number]   issued       07/11/21   15:28      Product Type CTP     Dispense Status issued     Unit Number (Barcoded) [id number]     Product Code (Barcoded) Z2620B28     Blood Type (Barcoded) 5100    Basic Metabolic Panel    Collection Time: 07/11/21 12:49 PM   Result Value Ref Range    Sodium 137 135 - 145 mmol/L    Potassium 5.0 3.6 - 5.5 mmol/L    Chloride 98 96 - 112 mmol/L    Co2 21 20 - 33 mmol/L    Glucose 111 (H) 65 - 99 mg/dL    Bun 13 8 - 22 mg/dL    Creatinine 0.89 0.50 - 1.40 mg/dL    Calcium 8.4 (L) 8.5 - 10.5 mg/dL    Anion Gap 18.0 (H) 7.0 - 16.0   CBC WITH DIFFERENTIAL    Collection Time: 07/11/21 12:49 PM   Result Value Ref Range    WBC 4.1 (L) 4.8 - 10.8 K/uL    RBC 2.48 (L) 4.20 - 5.40 M/uL    Hemoglobin 7.4 (L) 12.0 - 16.0 g/dL    Hematocrit 22.6 (L) 37.0 - 47.0 %    MCV 91.1 81.4 - 97.8 fL    MCH 29.8 27.0 - 33.0 pg    MCHC 32.7 (L) 33.6 - 35.0 g/dL    RDW 63.7 (H) 35.9 - 50.0 fL    Platelet Count 92 (L) 164 - 446 K/uL    MPV 11.9 9.0 - 12.9 fL    Neutrophils-Polys 71.70 44.00 - 72.00 %    Lymphocytes 19.10 (L) 22.00 - 41.00 %    Monocytes 8.70 0.00 - 13.40 %    Eosinophils 0.00 0.00 - 6.90 %    Basophils 0.00 0.00 - 1.80 %    Immature Granulocytes 0.50 0.00 - 0.90 %    Nucleated RBC 3.10 /100 WBC    Neutrophils (Absolute) 2.96 2.00 - 7.15 K/uL    Lymphs (Absolute) 0.79 (L) 1.00 - 4.80 K/uL    Monos (Absolute) 0.36 0.00 - 0.85 K/uL    Eos (Absolute) 0.00 0.00 - 0.51  K/uL    Baso (Absolute) 0.00 0.00 - 0.12 K/uL    Immature Granulocytes (abs) 0.02 0.00 - 0.11 K/uL    NRBC (Absolute) 0.13 K/uL   LACTIC ACID    Collection Time: 07/11/21 12:49 PM   Result Value Ref Range    Lactic Acid 6.0 (HH) 0.5 - 2.0 mmol/L   ESTIMATED GFR    Collection Time: 07/11/21 12:49 PM   Result Value Ref Range    GFR If African American >60 >60 mL/min/1.73 m 2    GFR If Non African American >60 >60 mL/min/1.73 m 2       Imaging:   Independant Imaging Review: Completed  DX-CHEST-PORTABLE (1 VIEW)   Final Result      1.  Satisfactory appearance of the newly placed left central venous catheter projecting over the distal SVC without pneumothorax.   2.  Enlarged cardiac silhouette.   3.  Right lower lobe opacity could be atelectasis or pneumonia.      DX-SINUSES-PARANASAL COMPLETE 3+   Final Result      1.  There is minimal left maxillary mucosal thickening as was seen on the recent CT head.   2.  There is no evidence of acute sinusitis.      US-RUQ   Final Result      1.  The liver is diffusely echogenic, most compatible with fatty infiltration, however other hepatocellular disease processes are in the differential diagnosis.   2.  Gallbladder is surgically absent. There is no biliary dilatation.      CTA ABDOMEN PELVIS W & W/O POST PROCESS   Final Result      1.  No active GI hemorrhage identified      2.  Postoperative changes of the stomach transverse colon      3.  Hepatic steatosis and hepatomegaly, both severe      4.  Small amount of ascites      DX-CHEST-PORTABLE (1 VIEW)   Final Result      1.  Hypoinflation without other evidence for acute cardiopulmonary disease.   2.  Supportive tubing is unchanged.      CT-TSPINE W/O PLUS RECONS   Final Result      1.  No acute osseous abnormality.   2.  Mild spondylosis.      CT-LSPINE W/O PLUS RECONS   Final Result      1.  No acute osseous abnormality.   2.  Postsurgical and mild degenerative changes as detailed.   3.  Hepatic steatosis.       CT-ABDOMEN-PELVIS W/O   Final Result      1.  Hepatic steatosis.   2.  New third spacing of fluid/anasarca with small amount of ascites and some generalized subcutaneous edema.   3.  Small focus of air involving the nondependent aspect of the urinary bladder which may be within the wall of the bladder or possibly within a small collapsed diverticulum. Correlate with urinalysis. Focal emphysematous cystitis cannot be excluded.   4.  Postsurgical changes as detailed.      These findings were discussed with MARIETTA ANDERSON on 7/9/2021 12:37 PM.             Problem Representation:    Thrombocytopenia (HCC)- (present on admission)  Assessment & Plan  Worsening over the last few days.  No clear etiology but could be ITP (WBCs and RBCs did not decline as platelets) versus TTP  Hematology consulted Dr. Gastelum  Recommended 1 units of platelets.  INR above 1.5, will give 1 dose of vitamin K in the light of lower GI bleed.  Take ordered and pending.  Consider FFP if patient continues to bleed after take resulted.  Monitor CBC every 8.    Electrolyte imbalance- (present on admission)  Assessment & Plan  Hyponatremia likely due to dehydration.  Judicial IV fluids considering chronic peripheral edema.    Lower GI bleed- (present on admission)  Assessment & Plan  Likely diverticular bleed in the light of thrombocytopenia and being on Eliquis per  Bradycardia has resolved with last bowel movement this a.m. was nonbloody.  H&H stable.  Continue to monitor H&H.  Transfuse if hemoglobin drops below 7 g/dL.    Recurrent syncope  Assessment & Plan  Chronic for few years now was attributed to adrenal insufficiency.  Has been more frequent lately.  Start on stress dose Solu-Cortef as mentioned above.  Continue home dose midodrine.  Gentle IV fluid resuscitation.  Fall precautions.    Severe protein-calorie malnutrition (HCC)- (present on admission)  Assessment & Plan  Patient has been having pain and burning in her buccal mucosa and throat  and her p.o. intake has been almost 0 over the last few days.  Thrush was noted on exam.  We will treat.  Encourage supplements between meals.  Encourage p.o. intake  Dietitian consulted.    Thrush- (present on admission)  Assessment & Plan  Started on nystatin oral solution swish and swallow.  If no improvement clinically, could consider to add Diflucan.    Incontinence- (present on admission)  Assessment & Plan  Complain of both urinary and fecal incontinence which is new in the setting of lower back pain and numbness across the lower back area that started 3 months ago after a fall.  We will check thoracic and lumbar spine CT.  Fall precautions.    Epistaxis- (present on admission)  Assessment & Plan  Suspecting due to nonhumidified oxygen through nose in the setting of thrombocytopenia.  Now resolved.  Consider humidified oxygen if needed while inpatient.    Hemochromatosis- (present on admission)  Assessment & Plan  History of PE  Follow-up with gastroenterology as an outpatient.    Now with worsening LFTs and increasing bilirubin likely due to starvation.  Continue to monitor for now.  Avoid hepatotoxic medications.    History of pulmonary embolus (PE)  Assessment & Plan  After COVID-19 vaccination as per patient?.  CTA of chest done on 4/27/2021 showed small left lower lobe subsegmental PE.  However repeat CTA on 5/10/2021 showed resolution.  Patient supposed to be on Eliquis for 3-month.  However, will hold for now due to thrombocytopenia and lower GI bleed.    Adrenal insufficiency (Ness's disease) (HCC)- (present on admission)  Assessment & Plan  History of.  Normally on Cortef oral.  We will start stress dose Solu-Cortef considering patient has been having frequent syncopal episodes lately for now and then gradually wean down back to home dose oral Cortef.    Chronic sinusitis  Assessment & Plan  History of chronic MRSA sinusitis.  On Xerava per ID recommendations.  We will continue.  MRSA PCR  pending.      Tubes: pIV x2  Fluids: D5 NS  Diet: NPO - bowel rest.  DVT prophylaxis: SCD  Antibiotics: Meropenem and Vancomycin  Code Status: Full  Disposition: Stable

## 2021-07-11 NOTE — PROGRESS NOTES
4 Eyes Skin Assessment Completed by AILYN Crain and AILYN Farris.      Generalized Jaundice  Head WDL   Ears WDL  Nose WDL  Mouth Ulcer(s)/oral thrush  Neck WDL  Breast/Chest WDL  Shoulder Blades WDL  Spine WDL  (R) Arm/Elbow/Hand Redness, Blanching, Bruising, Scab, Swelling, Weeping and Edema  (L) Arm/Elbow/Hand Redness, Blanching, Bruising, Scab, Swelling and Edema  Abdomen Redness, Blanching and Bruising, edema  Groin Redness and Excoriation  Scrotum/Coccyx/Buttocks Redness, Excoriation and Moisture Fissure  (R) Leg Redness, Blanching, Bruising, Swelling and Edema  (L) Leg Redness, Blanching, Bruising, Swelling and Edema  (R) Heel/Foot/Toe Redness and Blanching  (L) Heel/Foot/Toe Redness and Blanching          Devices In Places Tele Box, Blood Pressure Cuff and Central Line, [place]      Interventions In Place TAP System, Pillows, Q2 Turns, Barrier Cream, Dri-Jack Pads, Heels Loaded W/Pillows and Pressure Redistribution Mattress    Possible Skin Injury No    Pictures Uploaded Into Epic N/A  Wound Consult Placed N/A  RN Wound Prevention Protocol Ordered Yes

## 2021-07-11 NOTE — ASSESSMENT & PLAN NOTE
Continue home dose of cortef 20mg in AM and 10mg PM.   Add home dose fludrocoritisone  Pt is also on somatostatin? (will hold for now)

## 2021-07-12 PROBLEM — D68.9 COAGULOPATHY (HCC): Status: ACTIVE | Noted: 2021-07-12

## 2021-07-12 PROBLEM — K76.0 HEPATIC STEATOSIS: Status: ACTIVE | Noted: 2021-07-12

## 2021-07-12 LAB
ALBUMIN SERPL BCP-MCNC: 3.6 G/DL (ref 3.2–4.9)
ALBUMIN/GLOB SERPL: 3 G/DL
ALP SERPL-CCNC: 236 U/L (ref 30–99)
ALT SERPL-CCNC: 42 U/L (ref 2–50)
ANION GAP SERPL CALC-SCNC: 14 MMOL/L (ref 7–16)
AST SERPL-CCNC: 55 U/L (ref 12–45)
BASOPHILS # BLD AUTO: 0 % (ref 0–1.8)
BASOPHILS # BLD AUTO: 0.3 % (ref 0–1.8)
BASOPHILS # BLD AUTO: 0.3 % (ref 0–1.8)
BASOPHILS # BLD: 0 K/UL (ref 0–0.12)
BASOPHILS # BLD: 0.01 K/UL (ref 0–0.12)
BASOPHILS # BLD: 0.01 K/UL (ref 0–0.12)
BILIRUB CONJ SERPL-MCNC: 7.2 MG/DL (ref 0.1–0.5)
BILIRUB INDIRECT SERPL-MCNC: 2.7 MG/DL (ref 0–1)
BILIRUB SERPL-MCNC: 9.5 MG/DL (ref 0.1–1.5)
BILIRUB SERPL-MCNC: 9.9 MG/DL (ref 0.1–1.5)
BUN SERPL-MCNC: 14 MG/DL (ref 8–22)
CALCIUM SERPL-MCNC: 8.9 MG/DL (ref 8.5–10.5)
CHLORIDE SERPL-SCNC: 100 MMOL/L (ref 96–112)
CO2 SERPL-SCNC: 25 MMOL/L (ref 20–33)
CREAT SERPL-MCNC: 0.81 MG/DL (ref 0.5–1.4)
EOSINOPHIL # BLD AUTO: 0 K/UL (ref 0–0.51)
EOSINOPHIL NFR BLD: 0 % (ref 0–6.9)
ERYTHROCYTE [DISTWIDTH] IN BLOOD BY AUTOMATED COUNT: 58.4 FL (ref 35.9–50)
ERYTHROCYTE [DISTWIDTH] IN BLOOD BY AUTOMATED COUNT: 58.8 FL (ref 35.9–50)
ERYTHROCYTE [DISTWIDTH] IN BLOOD BY AUTOMATED COUNT: 61.6 FL (ref 35.9–50)
FIBRINOGEN PPP-MCNC: 164 MG/DL (ref 215–460)
GLOBULIN SER CALC-MCNC: 1.2 G/DL (ref 1.9–3.5)
GLUCOSE SERPL-MCNC: 125 MG/DL (ref 65–99)
HCT VFR BLD AUTO: 25.8 % (ref 37–47)
HCT VFR BLD AUTO: 26 % (ref 37–47)
HCT VFR BLD AUTO: 27.6 % (ref 37–47)
HGB BLD-MCNC: 8.7 G/DL (ref 12–16)
HGB BLD-MCNC: 8.7 G/DL (ref 12–16)
HGB BLD-MCNC: 9.3 G/DL (ref 12–16)
IMM GRANULOCYTES # BLD AUTO: 0.03 K/UL (ref 0–0.11)
IMM GRANULOCYTES NFR BLD AUTO: 0.8 % (ref 0–0.9)
IMM GRANULOCYTES NFR BLD AUTO: 0.8 % (ref 0–0.9)
IMM GRANULOCYTES NFR BLD AUTO: 1 % (ref 0–0.9)
INR PPP: 1.67 (ref 0.87–1.13)
LACTATE BLD-SCNC: 5.6 MMOL/L (ref 0.5–2)
LYMPHOCYTES # BLD AUTO: 0.63 K/UL (ref 1–4.8)
LYMPHOCYTES # BLD AUTO: 0.65 K/UL (ref 1–4.8)
LYMPHOCYTES # BLD AUTO: 0.68 K/UL (ref 1–4.8)
LYMPHOCYTES NFR BLD: 16.2 % (ref 22–41)
LYMPHOCYTES NFR BLD: 18.2 % (ref 22–41)
LYMPHOCYTES NFR BLD: 21.7 % (ref 22–41)
MCH RBC QN AUTO: 30 PG (ref 27–33)
MCH RBC QN AUTO: 30 PG (ref 27–33)
MCH RBC QN AUTO: 30.5 PG (ref 27–33)
MCHC RBC AUTO-ENTMCNC: 33.5 G/DL (ref 33.6–35)
MCHC RBC AUTO-ENTMCNC: 33.7 G/DL (ref 33.6–35)
MCHC RBC AUTO-ENTMCNC: 33.7 G/DL (ref 33.6–35)
MCV RBC AUTO: 89 FL (ref 81.4–97.8)
MCV RBC AUTO: 89 FL (ref 81.4–97.8)
MCV RBC AUTO: 91.2 FL (ref 81.4–97.8)
MONOCYTES # BLD AUTO: 0.35 K/UL (ref 0–0.85)
MONOCYTES # BLD AUTO: 0.42 K/UL (ref 0–0.85)
MONOCYTES # BLD AUTO: 0.43 K/UL (ref 0–0.85)
MONOCYTES NFR BLD AUTO: 11.1 % (ref 0–13.4)
MONOCYTES NFR BLD AUTO: 11.2 % (ref 0–13.4)
MONOCYTES NFR BLD AUTO: 11.7 % (ref 0–13.4)
NEUTROPHILS # BLD AUTO: 1.97 K/UL (ref 2–7.15)
NEUTROPHILS # BLD AUTO: 2.6 K/UL (ref 2–7.15)
NEUTROPHILS # BLD AUTO: 2.79 K/UL (ref 2–7.15)
NEUTROPHILS NFR BLD: 65.6 % (ref 44–72)
NEUTROPHILS NFR BLD: 69.5 % (ref 44–72)
NEUTROPHILS NFR BLD: 71.6 % (ref 44–72)
NRBC # BLD AUTO: 0.14 K/UL
NRBC # BLD AUTO: 0.17 K/UL
NRBC # BLD AUTO: 0.17 K/UL
NRBC BLD-RTO: 4.4 /100 WBC
NRBC BLD-RTO: 4.5 /100 WBC
NRBC BLD-RTO: 4.7 /100 WBC
PLATELET # BLD AUTO: 54 K/UL (ref 164–446)
PLATELET # BLD AUTO: 64 K/UL (ref 164–446)
PLATELET # BLD AUTO: 65 K/UL (ref 164–446)
PMV BLD AUTO: 11.5 FL (ref 9–12.9)
PMV BLD AUTO: 12.5 FL (ref 9–12.9)
PMV BLD AUTO: 12.7 FL (ref 9–12.9)
POTASSIUM SERPL-SCNC: 4.8 MMOL/L (ref 3.6–5.5)
PROT SERPL-MCNC: 4.8 G/DL (ref 6–8.2)
PROTHROMBIN TIME: 19.2 SEC (ref 12–14.6)
RBC # BLD AUTO: 2.85 M/UL (ref 4.2–5.4)
RBC # BLD AUTO: 2.9 M/UL (ref 4.2–5.4)
RBC # BLD AUTO: 3.1 M/UL (ref 4.2–5.4)
SODIUM SERPL-SCNC: 139 MMOL/L (ref 135–145)
WBC # BLD AUTO: 3 K/UL (ref 4.8–10.8)
WBC # BLD AUTO: 3.7 K/UL (ref 4.8–10.8)
WBC # BLD AUTO: 3.9 K/UL (ref 4.8–10.8)

## 2021-07-12 PROCEDURE — 83605 ASSAY OF LACTIC ACID: CPT

## 2021-07-12 PROCEDURE — 770022 HCHG ROOM/CARE - ICU (200)

## 2021-07-12 PROCEDURE — A9270 NON-COVERED ITEM OR SERVICE: HCPCS | Performed by: INTERNAL MEDICINE

## 2021-07-12 PROCEDURE — 99291 CRITICAL CARE FIRST HOUR: CPT | Performed by: INTERNAL MEDICINE

## 2021-07-12 PROCEDURE — 700105 HCHG RX REV CODE 258: Performed by: INTERNAL MEDICINE

## 2021-07-12 PROCEDURE — 4A00X4Z MEASUREMENT OF CENTRAL NERVOUS ELECTRICAL ACTIVITY, EXTERNAL APPROACH: ICD-10-PCS | Performed by: PSYCHIATRY & NEUROLOGY

## 2021-07-12 PROCEDURE — 82248 BILIRUBIN DIRECT: CPT

## 2021-07-12 PROCEDURE — 85610 PROTHROMBIN TIME: CPT

## 2021-07-12 PROCEDURE — A9270 NON-COVERED ITEM OR SERVICE: HCPCS | Performed by: FAMILY MEDICINE

## 2021-07-12 PROCEDURE — 95816 EEG AWAKE AND DROWSY: CPT | Mod: 26 | Performed by: PSYCHIATRY & NEUROLOGY

## 2021-07-12 PROCEDURE — 700111 HCHG RX REV CODE 636 W/ 250 OVERRIDE (IP): Performed by: INTERNAL MEDICINE

## 2021-07-12 PROCEDURE — 80053 COMPREHEN METABOLIC PANEL: CPT

## 2021-07-12 PROCEDURE — 83010 ASSAY OF HAPTOGLOBIN QUANT: CPT

## 2021-07-12 PROCEDURE — 85384 FIBRINOGEN ACTIVITY: CPT

## 2021-07-12 PROCEDURE — 85025 COMPLETE CBC W/AUTO DIFF WBC: CPT | Mod: 91

## 2021-07-12 PROCEDURE — 700102 HCHG RX REV CODE 250 W/ 637 OVERRIDE(OP): Performed by: FAMILY MEDICINE

## 2021-07-12 PROCEDURE — 82105 ALPHA-FETOPROTEIN SERUM: CPT

## 2021-07-12 PROCEDURE — 95816 EEG AWAKE AND DROWSY: CPT | Performed by: PSYCHIATRY & NEUROLOGY

## 2021-07-12 PROCEDURE — 700102 HCHG RX REV CODE 250 W/ 637 OVERRIDE(OP): Performed by: INTERNAL MEDICINE

## 2021-07-12 PROCEDURE — 700101 HCHG RX REV CODE 250: Performed by: INTERNAL MEDICINE

## 2021-07-12 PROCEDURE — 82247 BILIRUBIN TOTAL: CPT

## 2021-07-12 RX ORDER — FUROSEMIDE 10 MG/ML
20 INJECTION INTRAMUSCULAR; INTRAVENOUS EVERY 12 HOURS
Status: DISCONTINUED | OUTPATIENT
Start: 2021-07-12 | End: 2021-07-12

## 2021-07-12 RX ORDER — FUROSEMIDE 10 MG/ML
20 INJECTION INTRAMUSCULAR; INTRAVENOUS EVERY 8 HOURS
Status: DISCONTINUED | OUTPATIENT
Start: 2021-07-12 | End: 2021-07-14

## 2021-07-12 RX ORDER — LABETALOL HYDROCHLORIDE 5 MG/ML
10 INJECTION, SOLUTION INTRAVENOUS EVERY 4 HOURS PRN
Status: DISCONTINUED | OUTPATIENT
Start: 2021-07-12 | End: 2021-07-16

## 2021-07-12 RX ORDER — FLUDROCORTISONE ACETATE 0.1 MG/1
0.1 TABLET ORAL 2 TIMES DAILY
Status: DISCONTINUED | OUTPATIENT
Start: 2021-07-12 | End: 2021-07-23

## 2021-07-12 RX ADMIN — FUROSEMIDE 20 MG: 10 INJECTION, SOLUTION INTRAVENOUS at 22:48

## 2021-07-12 RX ADMIN — FLUDROCORTISONE ACETATE 0.1 MG: 0.1 TABLET ORAL at 10:39

## 2021-07-12 RX ADMIN — AMITRIPTYLINE HYDROCHLORIDE 10 MG: 10 TABLET, FILM COATED ORAL at 05:30

## 2021-07-12 RX ADMIN — NYSTATIN: 100000 POWDER TOPICAL at 18:03

## 2021-07-12 RX ADMIN — MUPIROCIN 1 APPLICATION: 20 OINTMENT TOPICAL at 18:02

## 2021-07-12 RX ADMIN — DULOXETINE HYDROCHLORIDE 60 MG: 60 CAPSULE, DELAYED RELEASE ORAL at 20:36

## 2021-07-12 RX ADMIN — LIOTHYRONINE SODIUM 25 MCG: 25 TABLET ORAL at 20:35

## 2021-07-12 RX ADMIN — CALCITONIN SALMON 200 UNITS: 200 SPRAY, METERED NASAL at 20:35

## 2021-07-12 RX ADMIN — NYSTATIN 500000 UNITS: 100000 SUSPENSION ORAL at 20:34

## 2021-07-12 RX ADMIN — NYSTATIN: 100000 POWDER TOPICAL at 12:00

## 2021-07-12 RX ADMIN — SUMATRIPTAN SUCCINATE 50 MG: 50 TABLET ORAL at 05:49

## 2021-07-12 RX ADMIN — FUROSEMIDE 20 MG: 10 INJECTION, SOLUTION INTRAVENOUS at 10:39

## 2021-07-12 RX ADMIN — Medication 1 EACH: at 18:03

## 2021-07-12 RX ADMIN — NYSTATIN 500000 UNITS: 100000 SUSPENSION ORAL at 12:12

## 2021-07-12 RX ADMIN — NYSTATIN 500000 UNITS: 100000 SUSPENSION ORAL at 08:01

## 2021-07-12 RX ADMIN — PHYTONADIONE 10 MG: 10 INJECTION, EMULSION INTRAMUSCULAR; INTRAVENOUS; SUBCUTANEOUS at 18:19

## 2021-07-12 RX ADMIN — HYDROCORTISONE 10 MG: 20 TABLET ORAL at 18:01

## 2021-07-12 RX ADMIN — COLESEVELAM HYDROCHLORIDE 625 MG: 625 TABLET, COATED ORAL at 18:02

## 2021-07-12 RX ADMIN — HYDROCORTISONE 20 MG: 20 TABLET ORAL at 05:31

## 2021-07-12 RX ADMIN — NYSTATIN: 100000 POWDER TOPICAL at 05:31

## 2021-07-12 RX ADMIN — COLESEVELAM HYDROCHLORIDE 625 MG: 625 TABLET, COATED ORAL at 08:01

## 2021-07-12 RX ADMIN — COLESEVELAM HYDROCHLORIDE 625 MG: 625 TABLET, COATED ORAL at 20:34

## 2021-07-12 RX ADMIN — SUMATRIPTAN SUCCINATE 50 MG: 50 TABLET ORAL at 10:40

## 2021-07-12 RX ADMIN — GABAPENTIN 400 MG: 400 CAPSULE ORAL at 05:31

## 2021-07-12 RX ADMIN — LABETALOL HYDROCHLORIDE 10 MG: 5 INJECTION, SOLUTION INTRAVENOUS at 13:04

## 2021-07-12 RX ADMIN — NYSTATIN 500000 UNITS: 100000 SUSPENSION ORAL at 18:01

## 2021-07-12 RX ADMIN — LEVOTHYROXINE SODIUM 75 MCG: 0.07 TABLET ORAL at 05:31

## 2021-07-12 RX ADMIN — Medication 2000 UNITS: at 05:31

## 2021-07-12 RX ADMIN — FENTANYL CITRATE 25 MCG: 50 INJECTION INTRAMUSCULAR; INTRAVENOUS at 12:08

## 2021-07-12 RX ADMIN — GABAPENTIN 400 MG: 400 CAPSULE ORAL at 18:02

## 2021-07-12 RX ADMIN — FLUDROCORTISONE ACETATE 0.1 MG: 0.1 TABLET ORAL at 18:07

## 2021-07-12 RX ADMIN — Medication: at 05:31

## 2021-07-12 ASSESSMENT — ENCOUNTER SYMPTOMS
NAUSEA: 0
SPUTUM PRODUCTION: 0
SEIZURES: 0
COUGH: 0
FALLS: 0
PALPITATIONS: 0
FEVER: 0
ORTHOPNEA: 0
ABDOMINAL PAIN: 1
NERVOUS/ANXIOUS: 0
DIARRHEA: 0
HEARTBURN: 0
CONSTIPATION: 0
BLOOD IN STOOL: 0
SHORTNESS OF BREATH: 0
NAUSEA: 1
EYE REDNESS: 0
EYE PAIN: 0
VOMITING: 0
BACK PAIN: 0
CHILLS: 1
HEMOPTYSIS: 0
WEAKNESS: 0
EYE DISCHARGE: 0
CHILLS: 0
DIARRHEA: 1
HEADACHES: 0

## 2021-07-12 ASSESSMENT — PAIN DESCRIPTION - PAIN TYPE
TYPE: ACUTE PAIN;CHRONIC PAIN
TYPE: ACUTE PAIN;CHRONIC PAIN

## 2021-07-12 ASSESSMENT — ACTIVITIES OF DAILY LIVING (ADL)
TOILETING_LEVEL_OF_ASSIST: REQUIRES PHYSICAL ASSIST WITH TOILETING
SHOWER_TRANSFER_LEVEL_OF_ASSIST: REQUIRES PHYSICAL ASSIST WITH SHOWER TRANSFER
TOILET_TRANSFER_LEVEL_OF_ASSIST: REQUIRES PHYSICAL ASSIST WITH TOILET TRANSFER

## 2021-07-12 NOTE — PROGRESS NOTES
Gastroenterology Progress Note     Author: Charlie Read M.D.   Date & Time Created: 7/12/2021 9:49 AM    Chief Complaint:  Abdominal pain, low platelets, elevated LFTs    Interval History:  Patient reports a solid BM without blood. Still having stomach pain    Review of Systems:  Review of Systems   Constitutional: Negative for chills and fever.   Respiratory: Negative for shortness of breath.    Cardiovascular: Negative for chest pain.   Gastrointestinal: Positive for abdominal pain and nausea. Negative for blood in stool, constipation, diarrhea, heartburn, melena and vomiting.       Physical Exam:  Physical Exam  Vitals reviewed.   Constitutional:       Appearance: She is obese.   Pulmonary:      Effort: Pulmonary effort is normal. No respiratory distress.      Breath sounds: Normal breath sounds.   Abdominal:      General: Abdomen is flat. Bowel sounds are normal. There is no distension.      Palpations: Abdomen is soft.      Tenderness: There is abdominal tenderness.   Neurological:      Mental Status: She is alert.         Labs:          Recent Labs     07/09/21  2119 07/10/21  0145 07/10/21  1117 07/10/21  2225 07/11/21  0130 07/11/21  0720 07/11/21  1249 07/11/21  1546 07/12/21  0523   SODIUM 133*   < > 132*   < > 139   < > 137 137 139   POTASSIUM 5.9*   < > 5.3   < > 5.0   < > 5.0 5.0 4.8   CHLORIDE 95*   < > 96   < > 100   < > 98 98 100   CO2 22   < > 22   < > 23   < > 21 22 25   BUN 14   < > 15   < > 14   < > 13 14 14   CREATININE 1.12   < > 1.18   < > 0.98   < > 0.89 0.86 0.81   MAGNESIUM 1.4*  --  1.7  --  1.9  --   --   --   --    PHOSPHORUS 5.7*  --  4.8*  --  4.8*  --   --   --   --    CALCIUM 7.3*   < > 7.0*   < > 8.0*   < > 8.4* 8.7 8.9    < > = values in this interval not displayed.     Recent Labs     07/10/21  1117 07/10/21  2225 07/11/21  0720 07/11/21  0720 07/11/21  1249 07/11/21  1546 07/12/21  0523 07/12/21  0755   CATA 64*  --  41  --   --   --  42  --    FLORENCIO 74*  --  45  --    --   --  55*  --    ALKPHOSPHAT 293*  --  209*  --   --   --  236*  --    TBILIRUBIN 7.1*  --  8.6*  --   --   --  9.5* 9.9*   DBILIRUBIN 6.3*  --   --   --   --   --   --  7.2*   LIPASE 52  --   --   --   --   --   --   --    GLUCOSE 108*   < > 122*  118*   < > 111* 130* 125*  --     < > = values in this interval not displayed.     Recent Labs     21  1510 07/10/21  0145 07/10/21  1117 07/11/21  0130 07/11/21  0720 07/11/21  0720 07/11/21  12421  1546 21  0523   RBC 3.63*   < >  --    < > 2.63*  --  2.48*  --   --  2.90*   HEMOGLOBIN 11.0*   < > 10.1*   < > 8.0*   < > 7.4*  --  8.7* 8.7*   HEMATOCRIT 32.3*   < > 29.6*   < > 24.2*   < > 22.6*  --  26.0* 25.8*   PLATELETCT 72*   < >  --    < > 26*  --  92*  --   --  65*   PROTHROMBTM  --   --  23.9*  --  23.2*  --   --  20.7*  --  19.2*   APTT  --   --  51.1*  --  43.4*  --   --  37.5*  --   --    INR  --   --  2.22*  --  2.14*  --   --  1.84*  --  1.67*   IRON 113  --   --   --   --   --   --   --   --   --    FERRITIN 136.0  --   --   --   --   --   --   --   --   --    TOTIRONBC 130*  --   --   --   --   --   --   --   --   --     < > = values in this interval not displayed.     Recent Labs     07/10/21  11121  01321  1249 21  0523 21  0755   WBC  --    < > 3.8* 4.1* 3.9*  --    NEUTSPOLYS  --    < > 71.10 71.70 71.60  --    LYMPHOCYTES  --    < > 19.60* 19.10* 16.20*  --    MONOCYTES  --    < > 8.50 8.70 11.10  --    EOSINOPHILS  --    < > 0.00 0.00 0.00  --    BASOPHILS  --    < > 0.30 0.00 0.30  --    ASTSGOT 74*  --  45  --  55*  --    ALTSGPT 64*  --  41  --  42  --    ALKPHOSPHAT 293*  --  209*  --  236*  --    TBILIRUBIN 7.1*  --  8.6*  --  9.5* 9.9*    < > = values in this interval not displayed.     Hemodynamics:  Temp (24hrs), Av.8 °C (96.5 °F), Min:35.6 °C (96.1 °F), Max:36.2 °C (97.1 °F)  Temperature: 35.9 °C (96.6 °F)  Pulse  Av  Min: 61  Max: 104   Blood Pressure:  (!) 183/104     Respiratory:    Respiration: 17, Pulse Oximetry: 90 %        RUL Breath Sounds: Clear;Diminished, RML Breath Sounds: Diminished, RLL Breath Sounds: Diminished, TRISTAN Breath Sounds: Clear;Diminished, LLL Breath Sounds: Diminished  Fluids:    Intake/Output Summary (Last 24 hours) at 7/12/2021 0949  Last data filed at 7/12/2021 0600  Gross per 24 hour   Intake 270 ml   Output 0 ml   Net 270 ml        GI/Nutrition:  Orders Placed This Encounter   Procedures   • Diet Order Diet: Cardiac     Standing Status:   Standing     Number of Occurrences:   1     Order Specific Question:   Diet:     Answer:   Cardiac [6]     Medical Decision Making, by Problem:  Active Hospital Problems    Diagnosis    • *DIC (disseminated intravascular coagulation) (HCC) [D65]    • Coagulopathy (HCC) [D68.9]    • Ischemic colitis (HCC) [K55.9]    • Lower GI bleed [K92.2]    • Epistaxis [R04.0]    • Electrolyte imbalance [E87.8]    • Thrush [B37.0]    • Incontinence [R32]    • Thrombocytopenia (HCC) [D69.6]    • Hemochromatosis [E83.119]    • Recurrent syncope [R55]    • History of pulmonary embolus (PE) [Z86.711]    • Adrenal insufficiency (Zeeland's disease) (HCC) [E27.1]    • Lactic acidemia [E87.2]    • Chronic sinusitis [J32.9]        Plan:  Elevated LFTs suspected to be secondary to ischemic hepatopathy. Also possible ischemic colitis. This is all likely secondary to DIC. Cause of DIC is unknown at this point. No urgent need for colonoscopy. Would continue to monitor LFTs and avoid hepatotoxins. Would monitor stools and H/H.     Quality-Core Measures

## 2021-07-12 NOTE — PLAN OF CARE (IOPOC)
Rounded on patient at bedside upon arrival to ICU.    Patient's BP high and she is doing ok. States that she feels cold. Communicated with the nursing staff that patient does not need any further transfusions. Patient not reporting any abdominal pain or any bleed at this time, wishes to eat. Diet order is placed.     Communicated with nursing staff that we will check labs once more at 2000 and that any remaining blood products ordered for this patient will be cancelled at this time as patient is hemodynamically stable and her labs appear stable. She will be resumed on her home meds at this time.         9:36 PM  Infectious disease consult was requested by the previous team. Dr. Marcus evaluated the patient and deemed that there is no acute infectious source at this time, is signing off. Will DC antibiotics.

## 2021-07-12 NOTE — HOSPITAL COURSE
7/9 admitted to hospital  7/10 GI and Gen surgery were consulted  7/11 admitted to ICU  7/11 received 1U PRBC, 2U FFP, 1U platelet, and 1U cryo   GI consulted, plan was for egd today but patient and patient's daughter changed their mind and would like to continue conservative management.   Hb 7.6 today, no sing of bleeding, will continue monitoring hb.

## 2021-07-12 NOTE — CARE PLAN
Problem: Skin Integrity  Goal: Skin integrity is maintained or improved  Outcome: Not Progressing  Note: Concern for moisture associated skin breakdown.           Problem: Fall Risk  Goal: Patient will remain free from falls  Outcome: Progressing  Note: Educated pt on the importance of calling for assistance and due to pt having syncopal episodes, safety is a top priority

## 2021-07-12 NOTE — DISCHARGE SUMMARY
Rehab Discharge Summary    Admission Date: 6/28/2021    Discharge Date: 7/9/2021    Attending Provider: Darion Metzger MD/PhD    Admission Diagnosis:   Active Hospital Problems    Diagnosis    • Abdominal pain    • Hypomagnesemia    • Hip swelling, left    • Thrombocytopenia (HCC)    • Hypophosphatemia    • Recurrent syncope    • Hemochromatosis    • History of pulmonary embolus (PE)    • MRSA (methicillin resistant Staphylococcus aureus) sinus infection    • Adrenal insufficiency (Orient's disease) (HCC)    • Hypertension    • Tachycardia    • Orthostatic hypotension    • Hypothyroidism    • Chronic systolic heart failure (HCC)    • Gastroesophageal reflux disease without esophagitis    • Depression        Discharge Diagnosis:  Active Hospital Problems    Diagnosis    • Abdominal pain    • Hypomagnesemia    • Hip swelling, left    • Thrombocytopenia (HCC)    • Hypophosphatemia    • Recurrent syncope    • Hemochromatosis    • History of pulmonary embolus (PE)    • MRSA (methicillin resistant Staphylococcus aureus) sinus infection    • Adrenal insufficiency (Orient's disease) (HCC)    • Hypertension    • Tachycardia    • Orthostatic hypotension    • Hypothyroidism    • Chronic systolic heart failure (HCC)    • Gastroesophageal reflux disease without esophagitis    • Depression        HPI per H&P:  Patient is a 55 y.o. female with a past medical history significant for sCHF, HTN, Hypothyroidism, Adrenal insufficiency, depression, and Hx of DVT/PE admitted to [facility] on 6/22/2021 12:49 PM with transient LOC. Patient has reportedly been hospitalized multiple times in the past month for syncopal work-up, including 5/10-5/15, 5/21-5/23, 5/25-6/3, and 6/15-6/18.  Per report patient had multiple episodes of LOC lasting between 2 and 3 minutes after changing position concerning for orthostatic hypotension. CT head was negative for acute abnormality. Per primary team concern for volume depletion as  well as autonomic disorder in setting of adrenal insufficiency. Patient is currently on florinef 0.1 mg daily and coreg and losartan have been reduced to lowest dose. Her Lasix was held for concern for dehydration. Creatinine and BUN have improved since admission.      Patient was last evaluated by PT on 6/26/21 and was unable to mobilize due to orthostatics, Ricardo for bed mobility. Patient was last evaluated by OT on 6/25/21 and was min-modA for ADLs. Patient was previously living in a 2 story home with 2 steps to enter and 14 steps in the home.     Patient was admitted to [facility] on 6/28/2021.     Hospital Course by Problem List:  Orthostatic hypotension - Probably a combination of volume depletion and underlying [place]'s disease, was started on Florinef at [facility] as well as SILVERIO hoses and abdominal binder  -PT and OT for mobility and ADLs  -Continue SILVERIO hoses and abdominal binder  -On Florinef 0.1 mg daily. Has CHF so needs to be on ARB and BB at lowest dose. Consider midodrine.   -Consult hospitalist. Syncope on day of evaluation while in shower 6/29  -Increase Florinef to BID, early AM and noon.       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 Now on Florinef. Will monitor electrolytes.   -Consult Hospitalist, considering increase in Hydrocortisone level. Currently 20 mg qAM and 10 mg q 1PM. Also changed Florinef to earlier morning dose  -Patient to bring in home medications prescribed by Endocrinologist     New bleeding - patient with very low platelets and now with epistaxis and BRBPR. Discussed with hospitalist and transfer to ED.       Incontinence - Patient with bowel and bladder incontinence which I typically do not associate with autonomic dysfunction but was present in literature. Removed bowel medications and any laxatives. She is on Lasix.  Will need follow-up. She has not had any spinal imaging (weakness, orthostatic hypotension, incontinence) which might be a  consideration as outpatient.       CHF - Patient on Coreg 3.125 mg and Losartan 25 mg daily  -Coreg held for repeat syncopal episodes.      Neuropathy - Patient on Gabapentin 400 mg BID     HLD - Patient on welchol.      CKD - Avoid nephrotoxic agents. Check AM CMP 0.61, stable     Anemia - Check AM CBC - resolved     Mood/Depression/Pain - Patient is on Cymbalta 60 mg and Amitriptyline 10 mg daily.      Elevated LFTs - Elevated on admission into 200s. Will monitor, was previously downtrending.   -Into 100s on repeat, will monitor     Asthma/COPD - Patient on Singulair on transfer.      Hypothyroidism - Patient on Levothyroxine 75 mcg and Cytomel 25 mcg QHS. Low TSH on admission, normal T4     MRSA sinusitis - Prolonged treatment, with multiple allergies to antibiotics. On Eravacycline 100 mg BID. Working with pharmacy on prescription of medication  -Nasal wound culture      Morbid Obesity due to excess calories - BMI of 37.84 on admission, meets medical criteria. Dietitian to follow     GERD -Patient on omeprazole on transfer.      Hx of PE - Patient on Eliquis    Functional Status at Discharge  Eating:  Modified Independent  Eating Description:  Increased time  Grooming:  Supervision  Grooming Description:  Increased time, Initial preparation for task, Set-up of equipment (brush teeth/wash face sitting up in bed)  Bathing:  Modified Independent  Bathing Description:  Grab bar, Hand held shower, Long handled bath tool, Tub bench, Assit with back, Assit wtih lower extremities, Increased time, Initial preparation for task, Set-up of equipment, Supervision for safety, Verbal cueing  Upper Body Dressing:  Modified Independent  Upper Body Dressing Description:  Increased time  Lower Body Dressing:  Minimal Assist  Lower Body Dressing Description:  Reachgonzalez, Khaib bar, Increased time, Initial preparation for task, Set-up of equipment, Supervision for safety, Verbal cueing  Discharge [facility] : Acute [facility]  Patient  Discharging with Assist of: No one, Patient will be Alone  Level of Supervision Required: Intermittent Supervision  Long Term Goals Met: 0  Long Term Goals Not Met: 2  Reason(s) for Goals Not Met: Patient transferred to Acute Care Hospital  Criteria for Termination of Services: Patient Transferred to Acute Care for Medical Purposes  Walk:  Unable to Participate  Distance Walked:  0  Number of Times Distance Was Traveled:  0  Assistive Device:  Front Wheel Walker  Gait Deviation:   (limited by medical just before PT session)  Wheelchair:  Total Assist  Distance Propelled:  0   Wheelchair Description:   (Total A from staff)  Stairs Unable to Participate  Stairs Description  (Unable. Limited due to Medical condition.)     Comprehension:  Modified Independent  Comprehension Description:  Hearing aids/amplifiers  Expression:  Independent  Expression Description:     Social Interaction:     Social Interaction Description:     Problem Solving:  Modified Independent  Problem Solving Description:  Increased time, Therapy schedule  Memory:  Modified Independent  Memory Description:  Therapy schedule       I, Darion Metzger M.D., personally performed a complete drug regimen review and no potential clinically significant medication issues were identified.   Discharge Medication:     Medication List      ASK your doctor about these medications      Instructions   Acetaminophen 8 Hour 650 MG CR tablet  Generic drug: acetaminophen   Take 650 mg by mouth every four hours as needed.  Dose: 650 mg     amitriptyline 10 MG Tabs  Commonly known as: ELAVIL   Take 10 mg by mouth every day.  Dose: 10 mg     calcitonin (salmon) 200 UNIT/ACT Soln  Commonly known as: MIACALCIN   Spray 1 Spray in nose every bedtime.  Dose: 1 Spray     colesevelam 625 MG Tabs  Commonly known as: WELCHOL   Take 625 mg by mouth in the morning, at noon, and at bedtime.  Dose: 625 mg     D3 2000 UNIT Tabs  Generic drug: Cholecalciferol   Take 2,000 Units  by mouth every day.  Dose: 2,000 Units     DULoxetine 60 MG Cpep delayed-release capsule  Commonly known as: CYMBALTA   Take 60 mg by mouth every bedtime.  Dose: 60 mg     Eliquis 5mg Tabs  Generic drug: apixaban   Take 5 mg by mouth 2 times a day.  Dose: 5 mg     fludrocortisone 0.1 MG Tabs  Commonly known as: FLORINEF   Take 0.1 mg by mouth 2 times a day.  Dose: 0.1 mg     furosemide 40 MG Tabs  Commonly known as: LASIX   Take 40 mg by mouth every day.  Dose: 40 mg     gabapentin 400 MG Caps  Commonly known as: NEURONTIN   Take 400 mg by mouth 2 times a day.  Dose: 400 mg     hydrocortisone 10 MG Tabs  Commonly known as: CORTEF   Take 10-20 mg by mouth 2 times a day. 20 mg in the AM  10 mg in the Afternoon  Dose: 10-20 mg     levothyroxine 75 MCG Tabs  Commonly known as: SYNTHROID   Take 75 mcg by mouth every morning on an empty stomach.  Dose: 75 mcg     lidocaine 5 % Ptch  Commonly known as: LIDODERM   Place 1 Patch on the skin every 12 hours.  Dose: 1 Patch     liothyronine 25 MCG Tabs  Commonly known as: CYTOMEL   Take 25 mcg by mouth every bedtime.  Dose: 25 mcg     magnesium chloride 64 MG Tbec  Commonly known as: MAG-64   Take 64 mg by mouth every day.  Dose: 64 mg     midodrine 5 MG Tabs  Commonly known as: PROAMATINE   Take 5 mg by mouth 3 times a day with meals.  Dose: 5 mg     montelukast 10 MG Tabs  Commonly known as: SINGULAIR   Take 10 mg by mouth every evening.  Dose: 10 mg     potassium chloride SA 20 MEQ Tbcr  Commonly known as: Kdur   Take 40 mEq by mouth every day.  Dose: 40 mEq     SUMAtriptan 50 MG Tabs  Commonly known as: IMITREX   Take 50 mg by mouth every 2 hours as needed for Migraine. Max daily dose is 200 mg in a 24 hour period  Dose: 50 mg     Xerava 100 MG Solr  Generic drug: Eravacycline Dihydrochloride   Infuse 100 mg into a venous catheter 2 times a day.  Dose: 100 mg     Zomacton 10 MG Solr  Generic drug: Somatropin   Inject 0.3 mg under the skin every evening.  Dose: 0.3 mg             Discharge Diet:  TBD    Discharge Activity:  TBD     Disposition:  Patient to transfer to ED     Equipment:  TBD    Follow-up & Discharge Instructions:  Follow up with your primary care provider (PCP) within 7-10 days of discharge to review your medications and take over your care.     If you develop chest pain, fever, chills, change in neurologic function (weakness, sensation changes, vision changes), or other concerning sxs, seek immediate medical attention or call 911.      Condition on Discharge:  Guarded      Darion Metzger M.D.       This note is for documentation purposes only.

## 2021-07-12 NOTE — PROGRESS NOTES
Infectious Disease Progress Note    Author: Mitch Marcus M.D. Date & Time created: 7/12/2021  1:02 PM     Antibiotics - None    7/12: No specific new complaints some abdominal tenderness unchanged from 7/11 without fever or leukocytosis.    Interval History:  Past 24 hrs reviewed with RN    Labs Reviewed, Medications Reviewed, Radiology Reviewed, Wound Reviewed, Fluids Reviewed and GI Nutrition Reviewed    Review of Systems:  Review of Systems   Constitutional: Positive for chills. Negative for fever.   Respiratory: Negative for cough and shortness of breath.    Cardiovascular: Negative for chest pain.   Gastrointestinal: Positive for abdominal pain (some left side). Negative for constipation, diarrhea, nausea and vomiting.   Genitourinary: Negative for dysuria.   Skin: Negative for itching.       Physical Exam:  Physical Exam  Vitals and nursing note reviewed.   Constitutional:       General: She is not in acute distress.     Appearance: She is overweight. She is not ill-appearing, toxic-appearing or diaphoretic.   HENT:      Head: Normocephalic and atraumatic.   Cardiovascular:      Rate and Rhythm: Normal rate and regular rhythm.      Heart sounds: No murmur heard.     Pulmonary:      Effort: Pulmonary effort is normal. No respiratory distress.      Breath sounds: No wheezing or rhonchi.   Abdominal:      General: Abdomen is flat. There is no distension.      Palpations: Abdomen is soft.      Tenderness: There is abdominal tenderness (left side of abdomen). There is rebound (slight). There is no guarding.   Skin:     General: Skin is warm and dry.   Neurological:      Mental Status: She is alert and oriented to person, place, and time.   Psychiatric:         Mood and Affect: Mood normal.         Behavior: Behavior normal.         Labs:  Recent Results (from the past 24 hour(s))   PLATELET MAPPING WITH BASIC TEG    Collection Time: 07/11/21  3:46 PM   Result Value Ref Range    Reaction Time Initial-R 5.1 5.0 -  10.0 min    Clot Kinetics-K 3.2 (H) 1.0 - 3.0 min    Clot Angle-Angle 58.1 53.0 - 72.0 degrees    Maximum Clot Strength-MA 53.0 50.0 - 70.0 mm    Lysis 30 minutes-LY30 0.0 0.0 - 8.0 %    % Inhibition ADP SEE COMMENT %    % Inhibition AA SEE COMMENT %    TEG Algorithm Link Algorithm    Prothrombin Time    Collection Time: 07/11/21  3:46 PM   Result Value Ref Range    PT 20.7 (H) 12.0 - 14.6 sec    INR 1.84 (H) 0.87 - 1.13   APTT    Collection Time: 07/11/21  3:46 PM   Result Value Ref Range    APTT 37.5 (H) 24.7 - 36.0 sec   D-DIMER    Collection Time: 07/11/21  3:46 PM   Result Value Ref Range    D-Dimer Screen 0.96 (H) 0.00 - 0.50 ug/mL (FEU)   FIBRINOGEN    Collection Time: 07/11/21  3:46 PM   Result Value Ref Range    Fibrinogen 130 (L) 215 - 460 mg/dL   Basic Metabolic Panel    Collection Time: 07/11/21  3:46 PM   Result Value Ref Range    Sodium 137 135 - 145 mmol/L    Potassium 5.0 3.6 - 5.5 mmol/L    Chloride 98 96 - 112 mmol/L    Co2 22 20 - 33 mmol/L    Glucose 130 (H) 65 - 99 mg/dL    Bun 14 8 - 22 mg/dL    Creatinine 0.86 0.50 - 1.40 mg/dL    Calcium 8.7 8.5 - 10.5 mg/dL    Anion Gap 17.0 (H) 7.0 - 16.0   LDH    Collection Time: 07/11/21  3:46 PM   Result Value Ref Range    LDH Total 240 107 - 266 U/L   ESTIMATED GFR    Collection Time: 07/11/21  3:46 PM   Result Value Ref Range    GFR If African American >60 >60 mL/min/1.73 m 2    GFR If Non African American >60 >60 mL/min/1.73 m 2   PLATELET MAPPING WITH BASIC TEG    Collection Time: 07/11/21  9:20 PM   Result Value Ref Range    Reaction Time Initial-R 5.7 5.0 - 10.0 min    Clot Kinetics-K 3.4 (H) 1.0 - 3.0 min    Clot Angle-Angle 53.1 53.0 - 72.0 degrees    Maximum Clot Strength-MA 53.4 50.0 - 70.0 mm    Lysis 30 minutes-LY30 0.0 0.0 - 8.0 %    % Inhibition ADP see comment %    % Inhibition AA see comment %    TEG Algorithm Link Algorithm    HEMOGLOBIN AND HEMATOCRIT    Collection Time: 07/11/21  9:20 PM   Result Value Ref Range    Hemoglobin 8.7 (L)  12.0 - 16.0 g/dL    Hematocrit 26.0 (L) 37.0 - 47.0 %   POCT glucose device results    Collection Time: 07/11/21  9:37 PM   Result Value Ref Range    Glucose - Accu-Ck 129 (H) 65 - 99 mg/dL   FIBRINOGEN    Collection Time: 07/11/21  9:42 PM   Result Value Ref Range    Fibrinogen 150 (L) 215 - 460 mg/dL   CBC WITH DIFFERENTIAL    Collection Time: 07/12/21  5:23 AM   Result Value Ref Range    WBC 3.9 (L) 4.8 - 10.8 K/uL    RBC 2.90 (L) 4.20 - 5.40 M/uL    Hemoglobin 8.7 (L) 12.0 - 16.0 g/dL    Hematocrit 25.8 (L) 37.0 - 47.0 %    MCV 89.0 81.4 - 97.8 fL    MCH 30.0 27.0 - 33.0 pg    MCHC 33.7 33.6 - 35.0 g/dL    RDW 58.4 (H) 35.9 - 50.0 fL    Platelet Count 65 (L) 164 - 446 K/uL    MPV 11.5 9.0 - 12.9 fL    Neutrophils-Polys 71.60 44.00 - 72.00 %    Lymphocytes 16.20 (L) 22.00 - 41.00 %    Monocytes 11.10 0.00 - 13.40 %    Eosinophils 0.00 0.00 - 6.90 %    Basophils 0.30 0.00 - 1.80 %    Immature Granulocytes 0.80 0.00 - 0.90 %    Nucleated RBC 4.40 /100 WBC    Neutrophils (Absolute) 2.79 2.00 - 7.15 K/uL    Lymphs (Absolute) 0.63 (L) 1.00 - 4.80 K/uL    Monos (Absolute) 0.43 0.00 - 0.85 K/uL    Eos (Absolute) 0.00 0.00 - 0.51 K/uL    Baso (Absolute) 0.01 0.00 - 0.12 K/uL    Immature Granulocytes (abs) 0.03 0.00 - 0.11 K/uL    NRBC (Absolute) 0.17 K/uL   Comp Metabolic Panel    Collection Time: 07/12/21  5:23 AM   Result Value Ref Range    Sodium 139 135 - 145 mmol/L    Potassium 4.8 3.6 - 5.5 mmol/L    Chloride 100 96 - 112 mmol/L    Co2 25 20 - 33 mmol/L    Anion Gap 14.0 7.0 - 16.0    Glucose 125 (H) 65 - 99 mg/dL    Bun 14 8 - 22 mg/dL    Creatinine 0.81 0.50 - 1.40 mg/dL    Calcium 8.9 8.5 - 10.5 mg/dL    AST(SGOT) 55 (H) 12 - 45 U/L    ALT(SGPT) 42 2 - 50 U/L    Alkaline Phosphatase 236 (H) 30 - 99 U/L    Total Bilirubin 9.5 (H) 0.1 - 1.5 mg/dL    Albumin 3.6 3.2 - 4.9 g/dL    Total Protein 4.8 (L) 6.0 - 8.2 g/dL    Globulin 1.2 (L) 1.9 - 3.5 g/dL    A-G Ratio 3.0 g/dL   Prothrombin Time    Collection Time:  07/12/21  5:23 AM   Result Value Ref Range    PT 19.2 (H) 12.0 - 14.6 sec    INR 1.67 (H) 0.87 - 1.13   FIBRINOGEN    Collection Time: 07/12/21  5:23 AM   Result Value Ref Range    Fibrinogen 164 (L) 215 - 460 mg/dL   LACTIC ACID    Collection Time: 07/12/21  5:23 AM   Result Value Ref Range    Lactic Acid 5.6 (HH) 0.5 - 2.0 mmol/L   ESTIMATED GFR    Collection Time: 07/12/21  5:23 AM   Result Value Ref Range    GFR If African American >60 >60 mL/min/1.73 m 2    GFR If Non African American >60 >60 mL/min/1.73 m 2   BILIRUBIN TOTAL    Collection Time: 07/12/21  7:55 AM   Result Value Ref Range    Total Bilirubin 9.9 (H) 0.1 - 1.5 mg/dL   BILIRUBIN DIRECT    Collection Time: 07/12/21  7:55 AM   Result Value Ref Range    Direct Bilirubin 7.2 (H) 0.1 - 0.5 mg/dL   BILIRUBIN INDIRECT    Collection Time: 07/12/21  7:55 AM   Result Value Ref Range    Indirect Bilirubin 2.7 (H) 0.0 - 1.0 mg/dL   CBC WITH DIFFERENTIAL    Collection Time: 07/12/21 12:05 PM   Result Value Ref Range    WBC 3.7 (L) 4.8 - 10.8 K/uL    RBC 3.10 (L) 4.20 - 5.40 M/uL    Hemoglobin 9.3 (L) 12.0 - 16.0 g/dL    Hematocrit 27.6 (L) 37.0 - 47.0 %    MCV 89.0 81.4 - 97.8 fL    MCH 30.0 27.0 - 33.0 pg    MCHC 33.7 33.6 - 35.0 g/dL    RDW 58.8 (H) 35.9 - 50.0 fL    Platelet Count 64 (L) 164 - 446 K/uL    MPV 12.5 9.0 - 12.9 fL    Neutrophils-Polys 69.50 44.00 - 72.00 %    Lymphocytes 18.20 (L) 22.00 - 41.00 %    Monocytes 11.20 0.00 - 13.40 %    Eosinophils 0.00 0.00 - 6.90 %    Basophils 0.30 0.00 - 1.80 %    Immature Granulocytes 0.80 0.00 - 0.90 %    Nucleated RBC 4.50 /100 WBC    Neutrophils (Absolute) 2.60 2.00 - 7.15 K/uL    Lymphs (Absolute) 0.68 (L) 1.00 - 4.80 K/uL    Monos (Absolute) 0.42 0.00 - 0.85 K/uL    Eos (Absolute) 0.00 0.00 - 0.51 K/uL    Baso (Absolute) 0.01 0.00 - 0.12 K/uL    Immature Granulocytes (abs) 0.03 0.00 - 0.11 K/uL    NRBC (Absolute) 0.17 K/uL     Results     Procedure Component Value Units Date/Time    BLOOD CULTURE  "[[id number]] Collected: 07/09/21 1819    Order Status: Completed Specimen: Blood from Peripheral Updated: 07/10/21 0854     Significant Indicator NEG     Source BLD     Site PERIPHERAL     Culture Result No Growth  Note: Blood cultures are incubated for 5 days and  are monitored continuously.Positive blood cultures  are called to the RN and reported as soon as  they are identified.      Narrative:      Per Hospital Policy: Only change Specimen Src: to \"Line\" if  specified by physician order.  Left Wrist    BLOOD CULTURE [[id number]] Collected: 07/09/21 2229    Order Status: Completed Specimen: Blood from Peripheral Updated: 07/10/21 0854     Significant Indicator NEG     Source BLD     Site PERIPHERAL     Culture Result No Growth  Note: Blood cultures are incubated for 5 days and  are monitored continuously.Positive blood cultures  are called to the RN and reported as soon as  they are identified.      Narrative:      Per Hospital Policy: Only change Specimen Src: to \"Line\" if  specified by physician order.  Right Forearm/Arm    URINALYSIS (UA) [[id number]]  (Abnormal) Collected: 07/10/21 0145    Order Status: Completed Specimen: Urine Updated: 07/10/21 0304     Color DK Yellow     Character Cloudy     Specific Gravity 1.015     Ph 5.0     Glucose Negative mg/dL      Ketones Negative mg/dL      Protein Negative mg/dL      Bilirubin Large     Urobilinogen, Urine 0.2     Nitrite Positive     Leukocyte Esterase Trace     Occult Blood Negative     Micro Urine Req Microscopic    Blood Culture [[id number]] Collected: 07/10/21 0000    Order Status: Canceled Specimen: Other from Peripheral     MRSA By PCR (Amp) [[id number]]  (Abnormal) Collected: 07/09/21 1833    Order Status: Completed Specimen: Respirate from Nares Updated: 07/09/21 2232     Significant Indicator POS     Source RESP     Site NARES     MRSA PCR POSITIVE for MRSA by PCR.    Narrative:      CALL  [facility]  [address] tel. [phone],  CALLED  183 tel. [phone] 07/09/2021, " 22:31, RB PERF. RESULTS CALLED  TO:[phone], faxed [phone].  Collected By:[id number] JUSTO ANGULO DANIELA  Collected By:[id number] JUSTO ANGULO DANIELA    URINALYSIS [[id number]]     Order Status: Sent Specimen: Urine, Clean Catch         Hemodynamics:  Temp (24hrs), Av.8 °C (96.5 °F), Min:35.6 °C (96.1 °F), Max:36.1 °C (96.9 °F)  Temperature: 35.9 °C (96.6 °F)  Pulse  Av  Min: 61  Max: 104   Blood Pressure: (!) 183/104     PICC Single Lumen Left Basilic (Active)   Site Assessment Clean;Dry;Intact 21   Line Status Scrubbed the hub prior to access;Blood return noted;Flushed;Saline locked 21   Dressing Type Biopatch;Transparent 21   Dressing Status Clean;Dry;Intact 21   Dressing Intervention N/A 21   Dressing Change Due 07/17/21 07/11/21 2100   Date Primary Tubing Changed 21   Date Secondary Tubing Changed 21   NEXT Primary Tubing Change  21   NEXT Secondary Tubing Change  21   NEXT IV Connector(s) Change 21   Line Necessity Assessed Lack of Peripheral Access 21   $ Single Lumen PICC Charge Single kit used 21       CVC Triple Lumen 21 Non-tunneled Left Internal jugular (Active)   Site Assessment Clean;Dry;Intact 21   Lumen 1 Status Scrubbed the hub prior to access;Blood return noted;Flushed;Normal saline locked 21   Lumen 3 Status Scrubbed the hub prior to access;Blood return noted;Flushed;Normal saline locked 21   Lumen 2 Status Scrubbed the hub prior to access;Blood return noted;Flushed;Normal saline locked 21   Dressing Type Biopatch;Transparent 21   Dressing Status Clean;Dry;New drainage 07/11/21 2300   Dressing Intervention Dressing changed per protocol 21   Dressing Change Due 21 230   NEXT Primary Tubing Change  21 230   NEXT Secondary Tubing  Change  07/13/21 07/11/21 2300   NEXT IV Connector(s) Change 07/17/21 07/11/21 2300   Waveform Not Applicable 07/11/21 2300   Line Calibrated Not Applicable 07/11/21 2300   Line Necessity Assessed Lack of Peripheral Access 07/11/21 2300     Wound:  @WOUNDLDA(4)@     Fluids:  Intake/Output                             07/10/21 0700 - 07/11/21 0659 07/11/21 0700 - 07/12/21 0659 07/12/21 0700 - 07/13/21 0659     [id number] 1169-8772 Total 8597-3441 6533-7449 Total 0396-0502 6472-0299 Total                    Intake    P.O.  100  -- 100  --  200 200  --  -- --    P.O. 100 -- 100 -- 200 200 -- -- --    Blood  --  374 374  70  -- 70  --  -- --    Volume (RELEASE FRESH FROZEN PLASMA (UNITS)) -- 374 374 -- -- -- -- -- --    Volume (RELEASE CRYOPRECIPITATE) -- -- -- 70 -- 70 -- -- --    Total Intake 100 374 474 70 200 270 -- -- --       Output    Urine  400  800 1200  --  -- --  --  -- --    Number of Times Voided -- -- -- -- 2 x 2 x -- -- --    Urine Void (mL)  -- -- -- -- -- --    Emesis  --  -- --  --  0 0  --  -- --    Emesis -- -- -- -- 0 0 -- -- --    Stool  --  -- --  --  -- --  --  -- --    Number of Times Stooled -- -- -- -- 2 x 2 x -- -- --    Total Output  -- 0 0 -- -- --       Net I/O     -300 -426 -726 70 200 270 -- -- --           GI/Nutrition:  Orders Placed This Encounter   Procedures   • Diet Order Diet: Cardiac     Standing Status:   Standing     Number of Occurrences:   1     Order Specific Question:   Diet:     Answer:   Cardiac [6]     Medications:  Current Facility-Administered Medications   Medication Last Admin   • labetalol (NORMODYNE/TRANDATE) injection 10 mg     • fludrocortisone (FLORINEF) tablet 0.1 mg 0.1 mg at 07/12/21 1039   • fentaNYL (SUBLIMAZE) injection 25-50 mcg 25 mcg at 07/12/21 1208   • furosemide (LASIX) injection 20 mg     • hydrocortisone (CORTEF) tablet 20 mg 20 mg at 07/12/21 0531   • hydrocortisone (CORTEF) tablet 10 mg     • nystatin (MYCOSTATIN) powder  Given at 07/12/21 1200   • bacitracin ointment Given at 07/12/21 0531   • ondansetron (ZOFRAN) syringe/vial injection 4 mg     • ondansetron (ZOFRAN ODT) dispertab 4 mg     • promethazine (PHENERGAN) tablet 12.5-25 mg     • promethazine (PHENERGAN) suppository 12.5-25 mg     • prochlorperazine (COMPAZINE) injection 5-10 mg     • nystatin (MYCOSTATIN) 480823 UNIT/ML suspension 500,000 Units 500,000 Units at 07/12/21 1212   • amitriptyline (ELAVIL) tablet 10 mg 10 mg at 07/12/21 0530   • calcitonin (salmon) (MIACALCIN) nasal spray 200 Units 200 Units at 07/11/21 2128   • vitamin D (cholecalciferol) tablet 2,000 Units 2,000 Units at 07/12/21 0531   • colesevelam (WELCHOL) tablet 625 mg 625 mg at 07/12/21 0801   • DULoxetine (CYMBALTA) capsule 60 mg 60 mg at 07/11/21 2131   • gabapentin (NEURONTIN) capsule 400 mg 400 mg at 07/12/21 0531   • levothyroxine (SYNTHROID) tablet 75 mcg 75 mcg at 07/12/21 0531   • liothyronine (CYTOMEL) tablet 25 mcg 25 mcg at 07/11/21 2125   • SUMAtriptan (IMITREX) tablet 50 mg 50 mg at 07/12/21 1040   • acetaminophen (Tylenol) tablet 650 mg 650 mg at 07/11/21 0610     Medical Decision Making, by Problem:  Active Hospital Problems    Diagnosis    • *DIC (disseminated intravascular coagulation) (HCC) [D65]    • Coagulopathy (HCC) [D68.9]    • Ischemic colitis (HCC) [K55.9]    • Lower GI bleed [K92.2]    • Epistaxis [R04.0]    • Electrolyte imbalance [E87.8]    • Thrush [B37.0]    • Incontinence [R32]    • Thrombocytopenia (HCC) [D69.6]    • Hemochromatosis [E83.119]    • Recurrent syncope [R55]    • History of pulmonary embolus (PE) [Z86.711]    • Adrenal insufficiency ([place] disease) (HCC) [E27.1]    • Lactic acidemia [E87.2]    • Chronic sinusitis [J32.9]      IMPRESSION:  1.  Thrombocytopenia.  2.  Chronic sinusitis.  3.  Diverticulosis.  4.  Abnormal liver function tests, which seemed as fatty liver.  5.  Hypothyroidism.  6.  History of congestive heart failure.  7.  Essential  hypertension.  8.  Adrenal insufficiency.  9.  Depression.  10.  DVT with PE.  11.  Recent lower GI bleed.  12.  Syncope.     PLAN:  1.  Discontinued vancomycin 7/11.  2.  Discontinued meropenem 7/11.  3.  Observe cardiac intensive care unit.  4.  Continue work with patient's other physicians.     No specific new complaints some abdominal tenderness unchanged from 7/11 without fever or leukocytosis. Observe for clinical signs of infection before resuming any antibiotics.Case and treatment reviewed with patient, , micro and RN 35 min on floor in CICU with 80% face to face view and 100% in direct patient care/counseloing today.

## 2021-07-12 NOTE — PROGRESS NOTES
"Critical Care Progress Note    Date of admission  7/9/2021    Chief Complaint  56 y.o. female with hx of [place]'s disease (on hydrocortisone and fludrocortisone), hematochromatosis, PE in 4/2021 (after COVID vaccine and now on Eliquis), hypothyroidism, chronic sinusitis (on omadacycline for total 6 months, seen by Lynnette GERMAN), hx multiple allergies, hx of gastric bypass surgery, cholecystectomy, hysterectomy, who's admitted 7/9/2021 with abdominal pain, lower GIB and thrombocytopenia. In addition, pt had hx of \"grand mal seizures\" in the past due to traumatic accidents and was on antiepileptic in the past, but not currently for \"many years\". Pt also has had episodes of syncope in the past 5 years per note. Eliquis was stopped due to lower GI bleed.  CT/CTA Abdomen/pelvis showed no active GI bleed. Severe hepatic steatosis. +anasarca, subcutaneous edema.     Hospital course including worsening LFT and bilirubin, diagnosis of DIC due to coagulopathy and hypofibrinogenemia, and pt had multiple transfusions on the floor.  Hematology was consulted for thrombocytopenia, and felt pt may have DIC with unknown underlying cause. GI was consulted for lower GI bleed. Pt had colonoscopy 6 months ago and reportedly \"normal\". Elevated LFTs were felt due to ischemic hepatopathy. Concern of ischemic colitis, but pt deemed not endoscopic candidate due to her coaguloapthy. Gen surgery was consulted and recommended medical treatment.     We were consulted because patient's case is complicated and requires a lot of nursing care on the floor.     Hospital Course  7/9 admitted to hospital  7/10 GI and Gen surgery were consulted  7/11 admitted to ICU  7/11 received 1U PRBC, 2U FFP, 1U platelet, and 1U cryo      Interval Problem Update  Reviewed last 24 hour events:  No episodes of hypotension, but rather hypertension  No episodes of melena, hematochezia, hematemesis  SBP in 150-180s  HR in 80s  On room air, satting >92%  Afebrile  Hgb 8.7, " platelet 65 (from 92)  Lactate 5.6  WBC 3.9 (16% lymph 11% mono)  K 4.8, HCO3 25  Creatinine 0.81  Total bilirubin was 9.5 (7.1 from admission)  US RUQ with no biliary dilatation. GB is surgically absent    Review of Systems  Review of Systems   Constitutional: Negative for chills, fever and malaise/fatigue.   HENT: Negative for congestion.    Respiratory: Negative for cough and shortness of breath.    Cardiovascular: Negative for chest pain, palpitations, orthopnea and leg swelling.   Gastrointestinal: Positive for abdominal pain. Negative for blood in stool, diarrhea, melena, nausea and vomiting.   Musculoskeletal: Negative for back pain, falls and joint pain.   Skin: Negative for rash.   Neurological: Negative for seizures, weakness and headaches.   Psychiatric/Behavioral: The patient is not nervous/anxious.    All other systems reviewed and are negative.       Vital Signs for last 24 hours   Temp:  [35.6 °C (96.1 °F)-36.2 °C (97.1 °F)] 35.9 °C (96.6 °F)  Pulse:  [62-89] 87  Resp:  [15-24] 18  BP: (116-167)/() 152/82  SpO2:  [91 %-96 %] 94 %    Hemodynamic parameters for last 24 hours       Respiratory Information for the last 24 hours       Physical Exam   Physical Exam  Vitals and nursing note reviewed.   Constitutional:       General: She is not in acute distress.     Appearance: She is ill-appearing and toxic-appearing. She is not diaphoretic.   HENT:      Head: Normocephalic.      Mouth/Throat:      Mouth: Mucous membranes are dry.   Cardiovascular:      Rate and Rhythm: Normal rate and regular rhythm.      Pulses: Normal pulses.      Heart sounds: Normal heart sounds. No murmur heard.     Pulmonary:      Effort: No respiratory distress.      Breath sounds: Normal breath sounds. No wheezing, rhonchi or rales.   Abdominal:      General: There is no distension.      Palpations: Abdomen is soft.      Tenderness: There is no abdominal tenderness. There is no guarding.   Musculoskeletal:      Cervical back:  Neck supple.      Right lower leg: No edema.      Left lower leg: No edema.   Skin:     Coloration: Skin is not jaundiced.      Findings: No bruising or rash.   Neurological:      General: No focal deficit present.      Mental Status: She is alert and oriented to person, place, and time.      Comments: Moving all extremities, no focal neuro deficit   Psychiatric:         Mood and Affect: Mood normal.         Behavior: Behavior normal.         Medications  Current Facility-Administered Medications   Medication Dose Route Frequency Provider Last Rate Last Admin   • lidocaine (XYLOCAINE) 1 % injection 20 mL  20 mL Other Once Kennedy Mistry M.D.       • hydrocortisone (CORTEF) tablet 20 mg  20 mg Oral QAM Damari Tran M.D.   20 mg at 07/12/21 0531   • hydrocortisone (CORTEF) tablet 10 mg  10 mg Oral Q EVENING Damari Tran M.D.       • nystatin (MYCOSTATIN) powder   Topical TID GARRY Luis M.D.   Given at 07/12/21 0531   • bacitracin ointment   Topical BID GARRY Luis M.D.   Given at 07/12/21 0531   • ondansetron (ZOFRAN) syringe/vial injection 4 mg  4 mg Intravenous Q4HRS PRN Ezequiel Quinonez M.D.       • ondansetron (ZOFRAN ODT) dispertab 4 mg  4 mg Oral Q4HRS PRN Ezequiel Quinonez M.D.       • promethazine (PHENERGAN) tablet 12.5-25 mg  12.5-25 mg Oral Q4HRS PRN Ezequiel Quinonez M.D.       • promethazine (PHENERGAN) suppository 12.5-25 mg  12.5-25 mg Rectal Q4HRS PRN Ezequiel Quinonez M.D.       • prochlorperazine (COMPAZINE) injection 5-10 mg  5-10 mg Intravenous Q4HRS PRN Ezequiel Quinonez M.D.       • nystatin (MYCOSTATIN) 089424 UNIT/ML suspension 500,000 Units  5 mL Swish & Swallow 4X/DAY Ezequiel Quinonez M.D.   500,000 Units at 07/11/21 2131   • amitriptyline (ELAVIL) tablet 10 mg  10 mg Oral DAILY Ezequiel Quinonez M.D.   10 mg at 07/12/21 0530   • calcitonin (salmon) (MIACALCIN) nasal spray 200 Units  1 Spray Nasal \[facility] [place] [facility]\"" Ezequiel Quinonez M.D.   200 Units at 07/11/21 6933   •  vitamin D (cholecalciferol) tablet 2,000 Units  2,000 Units Oral DAILY Ezequiel Quinonez M.D.   2,000 Units at 07/12/21 0531   • colesevelam (WELCHOL) tablet 625 mg  625 mg Oral TID Ezequiel Quinonez M.D.   625 mg at 07/11/21 2125   • DULoxetine (CYMBALTA) capsule 60 mg  60 mg Oral QHS Ezequiel Quinonez M.D.   60 mg at 07/11/21 2131   • gabapentin (NEURONTIN) capsule 400 mg  400 mg Oral BID Ezequiel Quinonez M.D.   400 mg at 07/12/21 0531   • levothyroxine (SYNTHROID) tablet 75 mcg  75 mcg Oral AM ES Ezequiel Quinonez M.D.   75 mcg at 07/12/21 0531   • liothyronine (CYTOMEL) tablet 25 mcg  25 mcg Oral QHS Ezequiel Quinonez M.D.   25 mcg at 07/11/21 2125   • SUMAtriptan (IMITREX) tablet 50 mg  50 mg Oral Q2HRS PRN Ezequiel Quinonez M.D.   50 mg at 07/12/21 0549   • acetaminophen (Tylenol) tablet 650 mg  650 mg Oral Q4HRS PRN Eligio Noble D.O.   650 mg at 07/11/21 0610     Facility-Administered Medications Ordered in Other Encounters   Medication Dose Route Frequency Provider Last Rate Last Admin   • [MAR Hold - Suspended Admission] omeprazole (PRILOSEC) capsule 20 mg  20 mg Oral BID Tracie Grover M.D.       • [MAR Hold - Suspended Admission] magnesium chloride (MAG-64) delayed-release tablet 64 mg  64 mg Oral DAILY Tracie Grover M.D.   64 mg at 07/09/21 0931   • [MAR Hold - Suspended Admission] midodrine (PROAMATINE) tablet 5 mg  5 mg Oral TID WITH MEALS Darion Metzger M.D.   5 mg at 07/09/21 0927   • [MAR Hold - Suspended Admission] fludrocortisone (FLORINEF) tablet 0.1 mg  0.1 mg Oral BID Tracie Grover M.D.   0.1 mg at 07/09/21 0535   • [MAR Hold - Suspended Admission] hydrophilic ointment (AQUAPHOR) OINT   Topical BID Darion Metzger M.D.   Given at 07/09/21 0932   • [MAR Hold - Suspended Admission] benzocaine (ANBESOL/ORAJEL) 20 % gel   Mouth/Throat PRN Darion Metzger M.D.       • [MAR Hold - Suspended Admission] Somatropin SOLR 0.3 mg  0.3 mg Subcutaneous QHS ANIL Null.O.   0.3 mg  at 07/08/21 2146   • [MAR Hold - Suspended Admission] montelukast (SINGULAIR) tablet 10 mg  10 mg Oral Q EVENING Darion Metzger M.D.   10 mg at 07/08/21 2143   • [MAR Hold - Suspended Admission] furosemide (LASIX) tablet 40 mg  40 mg Oral Q DAY Ney Owen M.D.   40 mg at 07/09/21 0535   • [MAR Hold - Suspended Admission] potassium chloride SA (Kdur) tablet 40 mEq  40 mEq Oral DAILY Ney Owen M.D.   40 mEq at 07/09/21 0928   • [MAR Hold - Suspended Admission] senna-docusate (PERICOLACE or SENOKOT S) 8.6-50 MG per tablet 2 tablet  2 tablet Oral BID PRN Ney Owen M.D.       • [MAR Hold - Suspended Admission] Eravacycline Dihydrochloride (XERAVA) 100 mg in  mL IVPB  100 mg Intravenous Q12HRS Darion Metzger M.D.   100 mg at 07/09/21 0944   • [MAR Hold - Suspended Admission] polyethylene glycol/lytes (MIRALAX) PACKET 1 Packet  1 Packet Oral QDAY PRN Darion Metzger M.D.       • [MAR Hold - Suspended Admission] magnesium hydroxide (MILK OF MAGNESIA) suspension 30 mL  30 mL Oral QDAY PRN Darion Metzger M.D.       • [MAR Hold - Suspended Admission] bisacodyl (DULCOLAX) suppository 10 mg  10 mg Rectal QDAY PRN Darion Metzger M.D.       • [MAR Hold - Suspended Admission] ondansetron (ZOFRAN ODT) dispertab 4 mg  4 mg Oral 4X/DAY PRN Darion Metzger M.D.       • [MAR Hold - Suspended Admission] ondansetron (ZOFRAN) syringe/vial injection 4 mg  4 mg Intramuscular 4X/DAY PRN Darion Metzger M.D.       • [MAR Hold - Suspended Admission] hydrocortisone (CORTEF) tablet 10 mg  10 mg Oral DAILY Darion Metzger M.D.   10 mg at 07/08/21 1510   • [MAR Hold - Suspended Admission] hydrocortisone (CORTEF) tablet 20 mg  20 mg Oral DAILY Darion Metzger M.D.   20 mg at 07/09/21 0930   • [MAR Hold - Suspended Admission] vitamin D (cholecalciferol) tablet 2,000 Units  2,000 Units Oral DAILY Darion Metzger M.D.   2,000 Units at  07/09/21 0928   • [MAR Hold - Suspended Admission] tizanidine (ZANAFLEX) tablet 4 mg  4 mg Oral QDAY PRN Darion Metzger M.D.       • [MAR Hold - Suspended Admission] SUMAtriptan (IMITREX) tablet 50 mg  50 mg Oral Q2HRS PRN Darion Metzger M.D.   50 mg at 07/09/21 0216   • [MAR Hold - Suspended Admission] liothyronine (CYTOMEL) tablet 25 mcg  25 mcg Oral QHS Darion Metzger M.D.   25 mcg at 07/08/21 2143   • [MAR Hold - Suspended Admission] levothyroxine (SYNTHROID) tablet 75 mcg  75 mcg Oral AM ES Darion Metzger M.D.   75 mcg at 07/09/21 0535   • [MAR Hold - Suspended Admission] gabapentin (NEURONTIN) capsule 400 mg  400 mg Oral BID Darion Metzger M.D.   400 mg at 07/09/21 0928   • [MAR Hold - Suspended Admission] DULoxetine (CYMBALTA) capsule 60 mg  60 mg Oral QHS Darion Metzger M.D.   60 mg at 07/08/21 2142   • [MAR Hold - Suspended Admission] colesevelam (WELCHOL) tablet 625 mg  625 mg Oral TID Darion Metzger M.D.   625 mg at 07/09/21 0930   • [MAR Hold - Suspended Admission] calcitonin (salmon) (MIACALCIN) nasal spray 200 Units  1 [place] Nasal QHS Darion Metzger M.D.   200 Units at 07/08/21 2145   • [MAR Hold - Suspended Admission] apixaban (ELIQUIS) tablet 5 mg  5 mg Oral BID Darion Metzger M.D.   5 mg at 07/09/21 0928   • [MAR Hold - Suspended Admission] amitriptyline (ELAVIL) tablet 10 mg  10 mg Oral DAILY Darion Metzger M.D.   10 mg at 07/09/21 0931   • [MAR Hold - Suspended Admission] sodium chloride (OCEAN) 0.65 % nasal spray 2 Spray  2 Spray Nasal PRN Darion Metzger M.D.       • [MAR Hold - Suspended Admission] traZODone (DESYREL) tablet 50 mg  50 mg Oral QHS PRN Darion Metzger M.D.   50 mg at 07/05/21 2217   • [MAR Hold - Suspended Admission] mag hydrox-al hydrox-simeth (MAALOX PLUS ES or MYLANTA DS) suspension 20 mL  20 mL Oral Q2HRS PRN Darion Metzger M.D.       • [MAR Hold -  Suspended Admission] benzocaine-menthol (CEPACOL) lozenge 1 Lozenge  1 Lozenge Mouth/Throat Q2HRS PRN Darion Metzger M.D.   1 Lozenge at 07/06/21 0141   • [MAR Hold - Suspended Admission] artificial tears ophthalmic solution 1 Drop  1 Drop Both Eyes PRN Darion Metzger M.D.       • [MAR Hold - Suspended Admission] acetaminophen (Tylenol) tablet 650 mg  650 mg Oral Q4HRS PRN Darion Metzger M.D.   650 mg at 07/09/21 0944   • [MAR Hold - Suspended Admission] hydrALAZINE (APRESOLINE) tablet 25 mg  25 mg Oral Q8HRS PRN Darion Metzger M.D.       • [MAR Hold - Suspended Admission] lidocaine (LIDODERM) 5 % 1 Patch  1 Patch Transdermal DAILY Darion Metzger M.D.   1 Patch at 07/09/21 0929       Fluids    Intake/Output Summary (Last 24 hours) at 7/12/2021 0642  Last data filed at 7/11/2021 2300  Gross per 24 hour   Intake 70 ml   Output 0 ml   Net 70 ml       Laboratory          Recent Labs     07/09/21  2119 07/10/21  0145 07/10/21  1117 07/10/21  2225 07/11/21  0130 07/11/21  0720 07/11/21  1249 07/11/21  1546 07/12/21  0523   SODIUM 133*   < > 132*   < > 139   < > 137 137 139   POTASSIUM 5.9*   < > 5.3   < > 5.0   < > 5.0 5.0 4.8   CHLORIDE 95*   < > 96   < > 100   < > 98 98 100   CO2 22   < > 22   < > 23   < > 21 22 25   BUN 14   < > 15   < > 14   < > 13 14 14   CREATININE 1.12   < > 1.18   < > 0.98   < > 0.89 0.86 0.81   MAGNESIUM 1.4*  --  1.7  --  1.9  --   --   --   --    PHOSPHORUS 5.7*  --  4.8*  --  4.8*  --   --   --   --    CALCIUM 7.3*   < > 7.0*   < > 8.0*   < > 8.4* 8.7 8.9    < > = values in this interval not displayed.     Recent Labs     07/10/21  1117 07/10/21  2225 07/11/21  0720 07/11/21  0720 07/11/21  1249 07/11/21  1546 07/12/21  0523   ALTSGPT 64*  --  41  --   --   --  42   ASTSGOT 74*  --  45  --   --   --  55*   ALKPHOSPHAT 293*  --  209*  --   --   --  236*   TBILIRUBIN 7.1*  --  8.6*  --   --   --  9.5*   DBILIRUBIN 6.3*  --   --   --   --   --    --    LIPASE 52  --   --   --   --   --   --    GLUCOSE 108*   < > 122*  118*   < > 111* 130* 125*    < > = values in this interval not displayed.     Recent Labs     07/10/21  1117 07/11/21  0130 07/11/21  0720 07/11/21  1249 07/12/21  0523   WBC  --    < > 3.8* 4.1* 3.9*   NEUTSPOLYS  --    < > 71.10 71.70 71.60   LYMPHOCYTES  --    < > 19.60* 19.10* 16.20*   MONOCYTES  --    < > 8.50 8.70 11.10   EOSINOPHILS  --    < > 0.00 0.00 0.00   BASOPHILS  --    < > 0.30 0.00 0.30   ASTSGOT 74*  --  45  --  55*   ALTSGPT 64*  --  41  --  42   ALKPHOSPHAT 293*  --  209*  --  236*   TBILIRUBIN 7.1*  --  8.6*  --  9.5*    < > = values in this interval not displayed.     Recent Labs     07/09/21  1510 07/10/21  0145 07/10/21  1117 07/11/21  0130 07/11/21  0720 07/11/21  0720 07/11/21  1249 07/11/21  1546 07/11/21  2120 07/12/21  0523   RBC 3.63*   < >  --    < > 2.63*  --  2.48*  --   --  2.90*   HEMOGLOBIN 11.0*   < > 10.1*   < > 8.0*   < > 7.4*  --  8.7* 8.7*   HEMATOCRIT 32.3*   < > 29.6*   < > 24.2*   < > 22.6*  --  26.0* 25.8*   PLATELETCT 72*   < >  --    < > 26*  --  92*  --   --  65*   PROTHROMBTM  --   --  23.9*  --  23.2*  --   --  20.7*  --  19.2*   APTT  --   --  51.1*  --  43.4*  --   --  37.5*  --   --    INR  --   --  2.22*  --  2.14*  --   --  1.84*  --  1.67*   IRON 113  --   --   --   --   --   --   --   --   --    FERRITIN 136.0  --   --   --   --   --   --   --   --   --    TOTIRONBC 130*  --   --   --   --   --   --   --   --   --     < > = values in this interval not displayed.       Imaging  X-Ray:  I have personally reviewed the images and compared with prior images.  CT:    Reviewed    Assessment/Plan  * Coagulopathy (HCC)  Assessment & Plan  I don't believe patient is in DIC. Though from laboratory standpoint, pt does exhibit thrombocytopenia, hypofibrinogenemia, prolonged INR and PTT, but this is all in the setting of liver cirrhosis. No clinical evidence of active bleeding. TEG is not impressive  and  no evidence of lysis  Pt has been transfused 1U PRBC, 2U FFP, 1U platelet, and 1U cryo yesterday (7/11), repeated labs last night were unremarkable. No further transfusions given.   Continue hold transfusions      Hepatic steatosis  Assessment & Plan  Severe hepatic steatosis noted on CT A/P, concerning if she has worsening liver dysfunction due to this.   Upon more conversation with ,  reported she has hx of alcohol use in the past 10 years but in the past 2 years have been drinking more alcohol. He did say that she quit drinking 2-3 months ago.   ?undiagnosed alcoholic cirrhosis in addition to her MCKEON +/- hemochromatosis if this diagnosis is really true.   Overall, I'm concern more of her picture is a manifestation of decompensating liver cirrhosis leading to coagulopathy.   Will give vitamin K 10mg IV daily x 3 days  Monitor coagulopathy  Frequent CBC       Lower GI bleed- (present on admission)  Assessment & Plan  -Resolved at this time, no evidence of melena since admission.   -Patient has received multiple blood products including 1U PRBC, 2U FFP and 1U plt  -No plan for colonoscopy at this time.   Monitor hemodynamics and CBC.   Transfuse only when Hgb <7    Thrombocytopenia (HCC)- (present on admission)  Assessment & Plan  I suspect it's her liver related thrombocytopenia.   Hem/Onc felt not ITP or TTP.   Pt not presenting herself as sepsis at this time.   Monitor closely, transfuse only when pt rebleeds and plt <20K      Hemochromatosis- (present on admission)  Assessment & Plan  I question the validity of this diagnosis as both patient and  couldn't tell me the details about this diagnosis.   CT A/P showed severe hepatic steatosis.       Recurrent syncope- (present on admission)  Assessment & Plan  Chronic problem due to orthostatic hypotension. Unclear to me what her underlying chronic condition that led to her recurrent orthostatic hypotension.   Spot EEG was done and negative.  Pt was awake and alert at time of study. Felt low utility at this time.         History of pulmonary embolus (PE)- (present on admission)  Assessment & Plan  No active respiratory symptoms      Adrenal insufficiency (Valentín's disease) (HCC)- (present on admission)  Assessment & Plan  Continue home dose of cortef 20mg in AM and 10mg PM.   Add home dose fludrocoritisone  Pt is also on somatostatin? (will hold for now)      Lactic acidemia- (present on admission)  Assessment & Plan  -Unlikely due to sepsis  -Primarily due to her liver dysfunction at this time  - Given liver dysfunction, will be poor clearance of her lactic acidosis    Chronic sinusitis- (present on admission)  Assessment & Plan  -ID has been following. Discontinue all antibiotics       VTE:  Contraindicated  Ulcer: Not Indicated  Lines: None    I have performed a physical exam and reviewed and updated ROS and Plan today (7/12/2021). In review of yesterday's note (7/11/2021), there are no changes except as documented above.     Patient has a complex comorbidities with evidence of coagulopathy and appears she came in with lower GI bleed. Also has hx of syncope in the past. Need close observation of her coagulopathy and hemodynamics    Discussed patient condition and risk of morbidity and/or mortality with Family, RN, RT, Pharmacy, Charge nurse / hot rounds and Patient  The patient remains critically ill.  Critical care time = 50 minutes in directly providing and coordinating critical care and extensive data review.  No time overlap and excludes procedures.
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
## 2021-07-12 NOTE — ASSESSMENT & PLAN NOTE
-Unlikely due to sepsis  -Primarily due to her liver dysfunction at this time  - Given liver dysfunction, will be poor clearance of her lactic acidosis

## 2021-07-12 NOTE — PROGRESS NOTES
Dr. Tran at bedside. Discussed POC. Dr. Tran to change orders in Epic. RN to not administer blood products or medications, except for steroid.

## 2021-07-12 NOTE — ASSESSMENT & PLAN NOTE
-Resolved at this time, no evidence of melena since admission.   -Patient has received multiple blood products including 1U PRBC, 2U FFP and 1U plt  -No plan for colonoscopy at this time.   Monitor hemodynamics and CBC.   Transfuse only when Hgb <7

## 2021-07-12 NOTE — PROGRESS NOTES
Notified by lab of critical lactic of 5.6. previously was 6. Informed on call MD. Pt has also complaints of Left upper quadrant pain. Rates it a 7/10 and is tender to touch. Pt states that she has been having this pain for about a week. States that it correlates when she has a migraine. PRN meds given for migraine. Informed on call MD.

## 2021-07-12 NOTE — ASSESSMENT & PLAN NOTE
Chronic problem due to orthostatic hypotension. Unclear to me what her underlying chronic condition that led to her recurrent orthostatic hypotension.   Spot EEG was done and negative. Pt was awake and alert at time of study. Felt low utility at this time.

## 2021-07-12 NOTE — DISCHARGE PLANNING
Patient transferred to [facility] on 7/9/21 and spent 3 midnights.  Discharged from  at [facility].

## 2021-07-12 NOTE — ASSESSMENT & PLAN NOTE
I don't believe patient is in DIC. Though from laboratory standpoint, pt does exhibit thrombocytopenia, hypofibrinogenemia, prolonged INR and PTT, but this is all in the setting of liver cirrhosis. No clinical evidence of active bleeding. TEG is not impressive and  no evidence of lysis. Retic is normal.   7/11 patient was transfused 1U PRBC, 2U FFP, 1U platelet, and 1U cryo   Monitoring coags closely.   Transfusion parameters: Hgb <7, plt <20k, INR >2, fibrinogen <100.

## 2021-07-12 NOTE — PROGRESS NOTES
4 Eyes Skin Assessment Completed by Marisela RN and AILYN Manrique.    Head WDL  Ears WDL  Nose WDL  Mouth WDL  Neck WDL  Breast/Chest Redness  Shoulder Blades WDL  Spine Redness and Bruising  (R) Arm/Elbow/Hand Bruising, Abrasion, Scab, Swelling, Rash, Weeping and Edema  (L) Arm/Elbow/Hand Redness, Bruising, Abrasion, Scab, Discoloration, Weeping, Shiny and Edema  Abdomen Bruising  Groin Redness and Excoriation  Scrotum/Coccyx/Buttocks Redness, Excoriation and Moisture Fissure  (R) Leg Redness, Swelling, Shiny, Weeping and Edema  (L) Leg Redness, Shiny, Weeping and Edema  (R) Heel/Foot/Toe Redness, Swelling and Edema  (L) Heel/Foot/Toe Redness and Swelling          Devices In Places ECG, Blood Pressure Cuff, Pulse Ox and Central Line      Interventions In Place Pillows, Q2 Turns, Low Air Loss Mattress and Heels Loaded W/Pillows    Possible Skin Injury Yes, DTI left heel    Pictures Uploaded Into Epic No, needs to be completed  Wound Consult Placed Yes  RN Wound Prevention Protocol Ordered No

## 2021-07-12 NOTE — ASSESSMENT & PLAN NOTE
I question the validity of this diagnosis as both patient and  couldn't tell me the details about this diagnosis.   CT A/P showed severe hepatic steatosis.

## 2021-07-12 NOTE — ASSESSMENT & PLAN NOTE
I suspect it's her liver related thrombocytopenia.   Hem/Onc felt not ITP or TTP but DIC is possible per Hem/Onc  Pt not presenting herself as sepsis at this time.   Monitor closely, transfuse only when pt rebleeds and plt <20K

## 2021-07-12 NOTE — ASSESSMENT & PLAN NOTE
-Will trend fibrinogen level per hematology request and transfuse cryo if < 100. Patient is already receiving cryo. Will complete this bag and no further cryo will be ordered until AM labs.   -Bleeding stopped at this time. Monitor for re-bleed.   -TEG ordered

## 2021-07-12 NOTE — PROCEDURES
ROUTINE ELECTROENCEPHALOGRAM REPORT      Referring provider: Dr. Tran.    DOS: 7/12/2021 (total recording of 26 minutes)    INDICATION:  Donna Isaac 56 y.o. female presenting with syncope.    CURRENT ANTIEPILEPTIC REGIMEN: Gabapentin.    TECHNIQUE: 30 channel routine electroencephalogram (EEG) was performed in accordance with the international 10-20 system. The study was reviewed in bipolar and referential montages. The recording examined the patient during wakeful and drowsy state(s).     DESCRIPTION OF THE RECORD:  During the wakefulness, the background showed a symmetrical 6 Hz theta activity posteriorly with amplitude of 70 mV.  There was reactivity to eye closure/opening.  A normal anterior-posterior gradient was noted with faster beta frequencies seen anteriorly.  During drowsiness, theta/delta frequencies were seen.    ACTIVATION PROCEDURES:   Intermittent Photic stimulation was performed in a stepwise fashion from 1 to 30 Hz and elicited a normal response (photic driving), most noticeable in the posterior leads.      ICTAL AND/OR INTERICTAL FINDINGS:   No focal or generalized epileptiform activity noted. No regional slowing was seen during this routine study.  No seizures were reported or recorded during the study.     EKG: sampling of the EKG recording demonstrated sinus rhythm.       INTERPRETATION:  This is an abnormal routine EEG recording in the awake and drowsy state(s).  The findings suggest a mild to moderate encephalopathy, which is nonspecific.  No seizures captured during the study.  Clinical correlation is recommended.    Note: This EEG does not rule out epilepsy.  If the clinical suspicion remains high for seizures, a prolonged recording to capture clinical or subclinical events may be helpful.        Yeyo Wright MD   Epilepsy and Neurodiagnostics.   Clinical  of Neurology [facility].   Diplomate in Neurology,  Epilepsy, and Electrodiagnostic Medicine.   Office: [phone]  Fax: [phone]

## 2021-07-12 NOTE — PROGRESS NOTES
Holding all medications and blood products for now. Health care team creating a plan of care for patient and completing chart review. Dr Linda at bedside assessing and discussing care with patient. RN to redraw TEG, HGB, and Fibrinogen at 2000. Patient aware of plan.

## 2021-07-13 ENCOUNTER — APPOINTMENT (OUTPATIENT)
Dept: RADIOLOGY | Facility: MEDICAL CENTER | Age: 56
DRG: 441 | End: 2021-07-13
Attending: INTERNAL MEDICINE
Payer: MEDICARE

## 2021-07-13 LAB
AFP-TM SERPL-MCNC: 1 NG/ML (ref 0–9)
ALBUMIN SERPL BCP-MCNC: 3.3 G/DL (ref 3.2–4.9)
ALBUMIN/GLOB SERPL: 3 G/DL
ALP SERPL-CCNC: 283 U/L (ref 30–99)
ALT SERPL-CCNC: 54 U/L (ref 2–50)
ANION GAP SERPL CALC-SCNC: 13 MMOL/L (ref 7–16)
ANISOCYTOSIS BLD QL SMEAR: ABNORMAL
ANISOCYTOSIS BLD QL SMEAR: ABNORMAL
AST SERPL-CCNC: 114 U/L (ref 12–45)
BASOPHILS # BLD AUTO: 0 % (ref 0–1.8)
BASOPHILS # BLD AUTO: 0 % (ref 0–1.8)
BASOPHILS # BLD AUTO: 0.3 % (ref 0–1.8)
BASOPHILS # BLD AUTO: 0.3 % (ref 0–1.8)
BASOPHILS # BLD: 0 K/UL (ref 0–0.12)
BASOPHILS # BLD: 0 K/UL (ref 0–0.12)
BASOPHILS # BLD: 0.01 K/UL (ref 0–0.12)
BASOPHILS # BLD: 0.01 K/UL (ref 0–0.12)
BILIRUB SERPL-MCNC: 12.3 MG/DL (ref 0.1–1.5)
BUN SERPL-MCNC: 18 MG/DL (ref 8–22)
CALCIUM SERPL-MCNC: 8.5 MG/DL (ref 8.5–10.5)
CHLORIDE SERPL-SCNC: 100 MMOL/L (ref 96–112)
CO2 SERPL-SCNC: 27 MMOL/L (ref 20–33)
CREAT SERPL-MCNC: 0.6 MG/DL (ref 0.5–1.4)
EOSINOPHIL # BLD AUTO: 0 K/UL (ref 0–0.51)
EOSINOPHIL NFR BLD: 0 % (ref 0–6.9)
ERYTHROCYTE [DISTWIDTH] IN BLOOD BY AUTOMATED COUNT: 59.5 FL (ref 35.9–50)
ERYTHROCYTE [DISTWIDTH] IN BLOOD BY AUTOMATED COUNT: 60.3 FL (ref 35.9–50)
ERYTHROCYTE [DISTWIDTH] IN BLOOD BY AUTOMATED COUNT: 60.4 FL (ref 35.9–50)
ERYTHROCYTE [DISTWIDTH] IN BLOOD BY AUTOMATED COUNT: 60.4 FL (ref 35.9–50)
FERRITIN SERPL-MCNC: 383 NG/ML (ref 10–291)
FIBRINOGEN PPP-MCNC: 128 MG/DL (ref 215–460)
GLOBULIN SER CALC-MCNC: 1.1 G/DL (ref 1.9–3.5)
GLUCOSE SERPL-MCNC: 126 MG/DL (ref 65–99)
HAPTOGLOB SERPL-MCNC: 21 MG/DL (ref 30–200)
HAPTOGLOB SERPL-MCNC: <10 MG/DL (ref 30–200)
HCT VFR BLD AUTO: 24.9 % (ref 37–47)
HCT VFR BLD AUTO: 25.1 % (ref 37–47)
HCT VFR BLD AUTO: 25.3 % (ref 37–47)
HCT VFR BLD AUTO: 25.7 % (ref 37–47)
HGB BLD-MCNC: 8.2 G/DL (ref 12–16)
HGB BLD-MCNC: 8.3 G/DL (ref 12–16)
HGB BLD-MCNC: 8.4 G/DL (ref 12–16)
HGB BLD-MCNC: 8.6 G/DL (ref 12–16)
HYPOCHROMIA BLD QL SMEAR: ABNORMAL
IMM GRANULOCYTES # BLD AUTO: 0.08 K/UL (ref 0–0.11)
IMM GRANULOCYTES # BLD AUTO: 0.1 K/UL (ref 0–0.11)
IMM GRANULOCYTES NFR BLD AUTO: 2.2 % (ref 0–0.9)
IMM GRANULOCYTES NFR BLD AUTO: 2.8 % (ref 0–0.9)
INR PPP: 1.64 (ref 0.87–1.13)
IRON SATN MFR SERPL: 86 % (ref 15–55)
IRON SERPL-MCNC: 106 UG/DL (ref 40–170)
LACTATE BLD-SCNC: 3.8 MMOL/L (ref 0.5–2)
LYMPHOCYTES # BLD AUTO: 0.85 K/UL (ref 1–4.8)
LYMPHOCYTES # BLD AUTO: 0.95 K/UL (ref 1–4.8)
LYMPHOCYTES # BLD AUTO: 1.38 K/UL (ref 1–4.8)
LYMPHOCYTES # BLD AUTO: 1.9 K/UL (ref 1–4.8)
LYMPHOCYTES NFR BLD: 23.7 % (ref 22–41)
LYMPHOCYTES NFR BLD: 26.3 % (ref 22–41)
LYMPHOCYTES NFR BLD: 30 % (ref 22–41)
LYMPHOCYTES NFR BLD: 39.5 % (ref 22–41)
MACROCYTES BLD QL SMEAR: ABNORMAL
MACROCYTES BLD QL SMEAR: ABNORMAL
MANUAL DIFF BLD: NORMAL
MANUAL DIFF BLD: NORMAL
MCH RBC QN AUTO: 30 PG (ref 27–33)
MCH RBC QN AUTO: 30.1 PG (ref 27–33)
MCH RBC QN AUTO: 30.1 PG (ref 27–33)
MCH RBC QN AUTO: 30.5 PG (ref 27–33)
MCHC RBC AUTO-ENTMCNC: 32.9 G/DL (ref 33.6–35)
MCHC RBC AUTO-ENTMCNC: 33.1 G/DL (ref 33.6–35)
MCHC RBC AUTO-ENTMCNC: 33.2 G/DL (ref 33.6–35)
MCHC RBC AUTO-ENTMCNC: 33.5 G/DL (ref 33.6–35)
MCV RBC AUTO: 90.4 FL (ref 81.4–97.8)
MCV RBC AUTO: 90.9 FL (ref 81.4–97.8)
MCV RBC AUTO: 91.1 FL (ref 81.4–97.8)
MCV RBC AUTO: 91.5 FL (ref 81.4–97.8)
METAMYELOCYTES NFR BLD MANUAL: 0.9 %
MICROCYTES BLD QL SMEAR: ABNORMAL
MONOCYTES # BLD AUTO: 0.26 K/UL (ref 0–0.85)
MONOCYTES # BLD AUTO: 0.34 K/UL (ref 0–0.85)
MONOCYTES # BLD AUTO: 0.36 K/UL (ref 0–0.85)
MONOCYTES # BLD AUTO: 0.41 K/UL (ref 0–0.85)
MONOCYTES NFR BLD AUTO: 10 % (ref 0–13.4)
MONOCYTES NFR BLD AUTO: 11.4 % (ref 0–13.4)
MONOCYTES NFR BLD AUTO: 5.5 % (ref 0–13.4)
MONOCYTES NFR BLD AUTO: 7.4 % (ref 0–13.4)
MORPHOLOGY BLD-IMP: NORMAL
MORPHOLOGY BLD-IMP: NORMAL
MYELOCYTES NFR BLD MANUAL: 0.9 %
NEUTROPHILS # BLD AUTO: 2.16 K/UL (ref 2–7.15)
NEUTROPHILS # BLD AUTO: 2.27 K/UL (ref 2–7.15)
NEUTROPHILS # BLD AUTO: 2.6 K/UL (ref 2–7.15)
NEUTROPHILS # BLD AUTO: 2.84 K/UL (ref 2–7.15)
NEUTROPHILS NFR BLD: 54.1 % (ref 44–72)
NEUTROPHILS NFR BLD: 59.8 % (ref 44–72)
NEUTROPHILS NFR BLD: 61.7 % (ref 44–72)
NEUTROPHILS NFR BLD: 63.2 % (ref 44–72)
NRBC # BLD AUTO: 0.22 K/UL
NRBC # BLD AUTO: 0.3 K/UL
NRBC # BLD AUTO: 0.36 K/UL
NRBC # BLD AUTO: 0.51 K/UL
NRBC BLD-RTO: 11.1 /100 WBC
NRBC BLD-RTO: 6.1 /100 WBC
NRBC BLD-RTO: 7.5 /100 WBC
NRBC BLD-RTO: 8.4 /100 WBC
NUCLEAR IGG SER QL IA: NORMAL
PLATELET # BLD AUTO: 21 K/UL (ref 164–446)
PLATELET # BLD AUTO: 26 K/UL (ref 164–446)
PLATELET # BLD AUTO: 32 K/UL (ref 164–446)
PLATELET # BLD AUTO: 38 K/UL (ref 164–446)
PLATELET BLD QL SMEAR: NORMAL
PLATELET BLD QL SMEAR: NORMAL
PLATELETS.RETICULATED NFR BLD AUTO: 16.6 K/UL (ref 0.6–13.1)
PLATELETS.RETICULATED NFR BLD AUTO: 17.3 K/UL (ref 0.6–13.1)
PMV BLD AUTO: 12.1 FL (ref 9–12.9)
PMV BLD AUTO: 12.6 FL (ref 9–12.9)
POIKILOCYTOSIS BLD QL SMEAR: NORMAL
POIKILOCYTOSIS BLD QL SMEAR: NORMAL
POLYCHROMASIA BLD QL SMEAR: NORMAL
POLYCHROMASIA BLD QL SMEAR: NORMAL
POTASSIUM SERPL-SCNC: 4.2 MMOL/L (ref 3.6–5.5)
PROT SERPL-MCNC: 4.4 G/DL (ref 6–8.2)
PROTHROMBIN TIME: 18.9 SEC (ref 12–14.6)
RBC # BLD AUTO: 2.72 M/UL (ref 4.2–5.4)
RBC # BLD AUTO: 2.76 M/UL (ref 4.2–5.4)
RBC # BLD AUTO: 2.8 M/UL (ref 4.2–5.4)
RBC # BLD AUTO: 2.82 M/UL (ref 4.2–5.4)
RBC BLD AUTO: PRESENT
RBC BLD AUTO: PRESENT
SCHISTOCYTES BLD QL SMEAR: NORMAL
SODIUM SERPL-SCNC: 140 MMOL/L (ref 135–145)
TARGETS BLD QL SMEAR: NORMAL
TARGETS BLD QL SMEAR: NORMAL
TIBC SERPL-MCNC: 123 UG/DL (ref 250–450)
UIBC SERPL-MCNC: <17 UG/DL (ref 110–370)
WBC # BLD AUTO: 3.6 K/UL (ref 4.8–10.8)
WBC # BLD AUTO: 3.6 K/UL (ref 4.8–10.8)
WBC # BLD AUTO: 4.6 K/UL (ref 4.8–10.8)
WBC # BLD AUTO: 4.8 K/UL (ref 4.8–10.8)

## 2021-07-13 PROCEDURE — 700102 HCHG RX REV CODE 250 W/ 637 OVERRIDE(OP): Performed by: INTERNAL MEDICINE

## 2021-07-13 PROCEDURE — 700105 HCHG RX REV CODE 258: Performed by: INTERNAL MEDICINE

## 2021-07-13 PROCEDURE — 83550 IRON BINDING TEST: CPT

## 2021-07-13 PROCEDURE — 83516 IMMUNOASSAY NONANTIBODY: CPT

## 2021-07-13 PROCEDURE — 770020 HCHG ROOM/CARE - TELE (206)

## 2021-07-13 PROCEDURE — 85027 COMPLETE CBC AUTOMATED: CPT

## 2021-07-13 PROCEDURE — 85384 FIBRINOGEN ACTIVITY: CPT

## 2021-07-13 PROCEDURE — 82787 IGG 1 2 3 OR 4 EACH: CPT | Mod: 91

## 2021-07-13 PROCEDURE — 83540 ASSAY OF IRON: CPT

## 2021-07-13 PROCEDURE — A9270 NON-COVERED ITEM OR SERVICE: HCPCS | Performed by: FAMILY MEDICINE

## 2021-07-13 PROCEDURE — 85025 COMPLETE CBC W/AUTO DIFF WBC: CPT | Mod: 91

## 2021-07-13 PROCEDURE — 700111 HCHG RX REV CODE 636 W/ 250 OVERRIDE (IP): Performed by: INTERNAL MEDICINE

## 2021-07-13 PROCEDURE — A9270 NON-COVERED ITEM OR SERVICE: HCPCS | Performed by: INTERNAL MEDICINE

## 2021-07-13 PROCEDURE — 80053 COMPREHEN METABOLIC PANEL: CPT

## 2021-07-13 PROCEDURE — 99233 SBSQ HOSP IP/OBS HIGH 50: CPT | Performed by: INTERNAL MEDICINE

## 2021-07-13 PROCEDURE — 30233N1 TRANSFUSION OF NONAUTOLOGOUS RED BLOOD CELLS INTO PERIPHERAL VEIN, PERCUTANEOUS APPROACH: ICD-10-PCS | Performed by: INTERNAL MEDICINE

## 2021-07-13 PROCEDURE — 82728 ASSAY OF FERRITIN: CPT

## 2021-07-13 PROCEDURE — 82105 ALPHA-FETOPROTEIN SERUM: CPT

## 2021-07-13 PROCEDURE — 85610 PROTHROMBIN TIME: CPT

## 2021-07-13 PROCEDURE — 85055 RETICULATED PLATELET ASSAY: CPT

## 2021-07-13 PROCEDURE — 85007 BL SMEAR W/DIFF WBC COUNT: CPT | Mod: 91

## 2021-07-13 PROCEDURE — 700102 HCHG RX REV CODE 250 W/ 637 OVERRIDE(OP): Performed by: FAMILY MEDICINE

## 2021-07-13 PROCEDURE — 83605 ASSAY OF LACTIC ACID: CPT

## 2021-07-13 RX ADMIN — NYSTATIN: 100000 POWDER TOPICAL at 11:54

## 2021-07-13 RX ADMIN — GABAPENTIN 400 MG: 400 CAPSULE ORAL at 17:05

## 2021-07-13 RX ADMIN — FENTANYL CITRATE 25 MCG: 50 INJECTION INTRAMUSCULAR; INTRAVENOUS at 21:26

## 2021-07-13 RX ADMIN — PHYTONADIONE 10 MG: 10 INJECTION, EMULSION INTRAMUSCULAR; INTRAVENOUS; SUBCUTANEOUS at 17:14

## 2021-07-13 RX ADMIN — AMITRIPTYLINE HYDROCHLORIDE 10 MG: 10 TABLET, FILM COATED ORAL at 06:32

## 2021-07-13 RX ADMIN — NYSTATIN 500000 UNITS: 100000 SUSPENSION ORAL at 12:00

## 2021-07-13 RX ADMIN — HYDROCORTISONE 10 MG: 20 TABLET ORAL at 17:05

## 2021-07-13 RX ADMIN — COLESEVELAM HYDROCHLORIDE 625 MG: 625 TABLET, COATED ORAL at 08:19

## 2021-07-13 RX ADMIN — LEVOTHYROXINE SODIUM 75 MCG: 0.07 TABLET ORAL at 05:42

## 2021-07-13 RX ADMIN — MUPIROCIN 2 EACH: 20 OINTMENT TOPICAL at 05:42

## 2021-07-13 RX ADMIN — SUMATRIPTAN SUCCINATE 50 MG: 50 TABLET ORAL at 08:19

## 2021-07-13 RX ADMIN — HYDROCORTISONE 20 MG: 20 TABLET ORAL at 05:43

## 2021-07-13 RX ADMIN — Medication: at 17:01

## 2021-07-13 RX ADMIN — NYSTATIN 500000 UNITS: 100000 SUSPENSION ORAL at 20:40

## 2021-07-13 RX ADMIN — NYSTATIN: 100000 POWDER TOPICAL at 05:43

## 2021-07-13 RX ADMIN — FUROSEMIDE 20 MG: 10 INJECTION, SOLUTION INTRAVENOUS at 21:26

## 2021-07-13 RX ADMIN — DULOXETINE HYDROCHLORIDE 60 MG: 60 CAPSULE, DELAYED RELEASE ORAL at 20:40

## 2021-07-13 RX ADMIN — FENTANYL CITRATE 25 MCG: 50 INJECTION INTRAMUSCULAR; INTRAVENOUS at 10:14

## 2021-07-13 RX ADMIN — NYSTATIN 500000 UNITS: 100000 SUSPENSION ORAL at 08:19

## 2021-07-13 RX ADMIN — FUROSEMIDE 20 MG: 10 INJECTION, SOLUTION INTRAVENOUS at 05:43

## 2021-07-13 RX ADMIN — Medication 2000 UNITS: at 05:43

## 2021-07-13 RX ADMIN — CALCITONIN SALMON 200 UNITS: 200 SPRAY, METERED NASAL at 20:40

## 2021-07-13 RX ADMIN — NYSTATIN 500000 UNITS: 100000 SUSPENSION ORAL at 17:05

## 2021-07-13 RX ADMIN — FUROSEMIDE 20 MG: 10 INJECTION, SOLUTION INTRAVENOUS at 16:48

## 2021-07-13 RX ADMIN — FLUDROCORTISONE ACETATE 0.1 MG: 0.1 TABLET ORAL at 17:05

## 2021-07-13 RX ADMIN — COLESEVELAM HYDROCHLORIDE 625 MG: 625 TABLET, COATED ORAL at 16:48

## 2021-07-13 RX ADMIN — NYSTATIN: 100000 POWDER TOPICAL at 17:01

## 2021-07-13 RX ADMIN — GABAPENTIN 400 MG: 400 CAPSULE ORAL at 05:43

## 2021-07-13 RX ADMIN — MUPIROCIN 1 APPLICATION: 20 OINTMENT TOPICAL at 17:01

## 2021-07-13 RX ADMIN — FLUDROCORTISONE ACETATE 0.1 MG: 0.1 TABLET ORAL at 05:42

## 2021-07-13 RX ADMIN — COLESEVELAM HYDROCHLORIDE 625 MG: 625 TABLET, COATED ORAL at 21:26

## 2021-07-13 RX ADMIN — LIOTHYRONINE SODIUM 25 MCG: 25 TABLET ORAL at 20:40

## 2021-07-13 RX ADMIN — Medication: at 05:42

## 2021-07-13 ASSESSMENT — ENCOUNTER SYMPTOMS
FEVER: 0
SHORTNESS OF BREATH: 0
COUGH: 0
HEARTBURN: 0
CHILLS: 0
FALLS: 0
ORTHOPNEA: 0
ABDOMINAL PAIN: 1
VOMITING: 0
DIARRHEA: 0
NAUSEA: 0
HEADACHES: 0
BACK PAIN: 0
BLOOD IN STOOL: 0
NERVOUS/ANXIOUS: 0
SEIZURES: 0
CHILLS: 1
PALPITATIONS: 0
WEAKNESS: 0
CONSTIPATION: 0

## 2021-07-13 ASSESSMENT — PAIN DESCRIPTION - PAIN TYPE
TYPE: ACUTE PAIN;CHRONIC PAIN

## 2021-07-13 NOTE — PROGRESS NOTES
Infectious Disease Progress Note    Author: Ney Rdz M.D. Date & Time created: 7/13/2021  12:13 PM     Antibiotics - None    Previous:  7/9-7/11: Meropenem  7/9-7/10: Vanco    7/12: No specific new complaints some abdominal tenderness unchanged from 7/11 without fever or leukocytosis.  7/13: No acute issues overnight. No fevers.    Interval History:  Past 24 hrs reviewed with RN    Labs Reviewed, Medications Reviewed, Radiology Reviewed, Wound Reviewed, Fluids Reviewed and GI Nutrition Reviewed    Review of Systems:  Review of Systems   Constitutional: Negative for fever.   Respiratory: Negative for cough and shortness of breath.    Cardiovascular: Negative for chest pain.   Gastrointestinal: Positive for abdominal pain (some left side). Negative for constipation, diarrhea, nausea and vomiting.   Genitourinary: Negative for dysuria.   Skin: Negative for itching.       Physical Exam:  Physical Exam  Vitals and nursing note reviewed.   Constitutional:       General: She is not in acute distress.     Appearance: She is overweight. She is not ill-appearing, toxic-appearing or diaphoretic.   HENT:      Head: Normocephalic and atraumatic.   Cardiovascular:      Rate and Rhythm: Normal rate and regular rhythm.      Heart sounds: No murmur heard.     Pulmonary:      Effort: Pulmonary effort is normal. No respiratory distress.      Breath sounds: No wheezing or rhonchi.   Abdominal:      General: Abdomen is flat. There is no distension.      Palpations: Abdomen is soft.      Tenderness: There is abdominal tenderness (left side of abdomen). There is rebound (slight). There is no guarding.   Skin:     General: Skin is warm and dry.   Neurological:      Mental Status: She is alert and oriented to person, place, and time.   Psychiatric:         Mood and Affect: Mood normal.         Behavior: Behavior normal.         Labs:  Recent Results (from the past 24 hour(s))   CBC WITH DIFFERENTIAL    Collection Time: 07/12/21   7:35 PM   Result Value Ref Range    WBC 3.0 (L) 4.8 - 10.8 K/uL    RBC 2.85 (L) 4.20 - 5.40 M/uL    Hemoglobin 8.7 (L) 12.0 - 16.0 g/dL    Hematocrit 26.0 (L) 37.0 - 47.0 %    MCV 91.2 81.4 - 97.8 fL    MCH 30.5 27.0 - 33.0 pg    MCHC 33.5 (L) 33.6 - 35.0 g/dL    RDW 61.6 (H) 35.9 - 50.0 fL    Platelet Count 54 (L) 164 - 446 K/uL    MPV 12.7 9.0 - 12.9 fL    Neutrophils-Polys 65.60 44.00 - 72.00 %    Lymphocytes 21.70 (L) 22.00 - 41.00 %    Monocytes 11.70 0.00 - 13.40 %    Eosinophils 0.00 0.00 - 6.90 %    Basophils 0.00 0.00 - 1.80 %    Immature Granulocytes 1.00 (H) 0.00 - 0.90 %    Nucleated RBC 4.70 /100 WBC    Neutrophils (Absolute) 1.97 (L) 2.00 - 7.15 K/uL    Lymphs (Absolute) 0.65 (L) 1.00 - 4.80 K/uL    Monos (Absolute) 0.35 0.00 - 0.85 K/uL    Eos (Absolute) 0.00 0.00 - 0.51 K/uL    Baso (Absolute) 0.00 0.00 - 0.12 K/uL    Immature Granulocytes (abs) 0.03 0.00 - 0.11 K/uL    NRBC (Absolute) 0.14 K/uL   CBC WITH DIFFERENTIAL    Collection Time: 07/13/21  5:57 AM   Result Value Ref Range    WBC 3.6 (L) 4.8 - 10.8 K/uL    RBC 2.72 (L) 4.20 - 5.40 M/uL    Hemoglobin 8.2 (L) 12.0 - 16.0 g/dL    Hematocrit 24.9 (L) 37.0 - 47.0 %    MCV 91.5 81.4 - 97.8 fL    MCH 30.1 27.0 - 33.0 pg    MCHC 32.9 (L) 33.6 - 35.0 g/dL    RDW 60.3 (H) 35.9 - 50.0 fL    Platelet Count 38 (LL) 164 - 446 K/uL    MPV 12.1 9.0 - 12.9 fL    Neutrophils-Polys 59.80 44.00 - 72.00 %    Lymphocytes 26.30 22.00 - 41.00 %    Monocytes 11.40 0.00 - 13.40 %    Eosinophils 0.00 0.00 - 6.90 %    Basophils 0.30 0.00 - 1.80 %    Immature Granulocytes 2.20 (H) 0.00 - 0.90 %    Nucleated RBC 6.10 /100 WBC    Neutrophils (Absolute) 2.16 2.00 - 7.15 K/uL    Lymphs (Absolute) 0.95 (L) 1.00 - 4.80 K/uL    Monos (Absolute) 0.41 0.00 - 0.85 K/uL    Eos (Absolute) 0.00 0.00 - 0.51 K/uL    Baso (Absolute) 0.01 0.00 - 0.12 K/uL    Immature Granulocytes (abs) 0.08 0.00 - 0.11 K/uL    NRBC (Absolute) 0.22 K/uL   Comp Metabolic Panel    Collection Time: 07/13/21   5:57 AM   Result Value Ref Range    Sodium 140 135 - 145 mmol/L    Potassium 4.2 3.6 - 5.5 mmol/L    Chloride 100 96 - 112 mmol/L    Co2 27 20 - 33 mmol/L    Anion Gap 13.0 7.0 - 16.0    Glucose 126 (H) 65 - 99 mg/dL    Bun 18 8 - 22 mg/dL    Creatinine 0.60 0.50 - 1.40 mg/dL    Calcium 8.5 8.5 - 10.5 mg/dL    AST(SGOT) 114 (H) 12 - 45 U/L    ALT(SGPT) 54 (H) 2 - 50 U/L    Alkaline Phosphatase 283 (H) 30 - 99 U/L    Total Bilirubin 12.3 (H) 0.1 - 1.5 mg/dL    Albumin 3.3 3.2 - 4.9 g/dL    Total Protein 4.4 (L) 6.0 - 8.2 g/dL    Globulin 1.1 (L) 1.9 - 3.5 g/dL    A-G Ratio 3.0 g/dL   Prothrombin Time    Collection Time: 07/13/21  5:57 AM   Result Value Ref Range    PT 18.9 (H) 12.0 - 14.6 sec    INR 1.64 (H) 0.87 - 1.13   FIBRINOGEN    Collection Time: 07/13/21  5:57 AM   Result Value Ref Range    Fibrinogen 128 (L) 215 - 460 mg/dL   ESTIMATED GFR    Collection Time: 07/13/21  5:57 AM   Result Value Ref Range    GFR If African American >60 >60 mL/min/1.73 m 2    GFR If Non African American >60 >60 mL/min/1.73 m 2   LACTIC ACID    Collection Time: 07/13/21  5:58 AM   Result Value Ref Range    Lactic Acid 3.8 (H) 0.5 - 2.0 mmol/L   CBC WITH DIFFERENTIAL    Collection Time: 07/13/21 10:10 AM   Result Value Ref Range    WBC 3.6 (L) 4.8 - 10.8 K/uL    RBC 2.82 (L) 4.20 - 5.40 M/uL    Hemoglobin 8.6 (L) 12.0 - 16.0 g/dL    Hematocrit 25.7 (L) 37.0 - 47.0 %    MCV 91.1 81.4 - 97.8 fL    MCH 30.5 27.0 - 33.0 pg    MCHC 33.5 (L) 33.6 - 35.0 g/dL    RDW 60.4 (H) 35.9 - 50.0 fL    Platelet Count 32 (LL) 164 - 446 K/uL    MPV 12.6 9.0 - 12.9 fL    Neutrophils-Polys 63.20 44.00 - 72.00 %    Lymphocytes 23.70 22.00 - 41.00 %    Monocytes 10.00 0.00 - 13.40 %    Eosinophils 0.00 0.00 - 6.90 %    Basophils 0.30 0.00 - 1.80 %    Immature Granulocytes 2.80 (H) 0.00 - 0.90 %    Nucleated RBC 8.40 /100 WBC    Neutrophils (Absolute) 2.27 2.00 - 7.15 K/uL    Lymphs (Absolute) 0.85 (L) 1.00 - 4.80 K/uL    Monos (Absolute) 0.36 0.00 - 0.85  "K/uL    Eos (Absolute) 0.00 0.00 - 0.51 K/uL    Baso (Absolute) 0.01 0.00 - 0.12 K/uL    Immature Granulocytes (abs) 0.10 0.00 - 0.11 K/uL    NRBC (Absolute) 0.30 K/uL     Results     Procedure Component Value Units Date/Time    BLOOD CULTURE [[id number]] Collected: 07/09/21 1819    Order Status: Completed Specimen: Blood from Peripheral Updated: 07/10/21 0854     Significant Indicator NEG     Source BLD     Site PERIPHERAL     Culture Result No Growth  Note: Blood cultures are incubated for 5 days and  are monitored continuously.Positive blood cultures  are called to the RN and reported as soon as  they are identified.      Narrative:      Per Hospital Policy: Only change Specimen Src: to \"Line\" if  specified by physician order.  Left Wrist    BLOOD CULTURE [[id number]] Collected: 07/09/21 2229    Order Status: Completed Specimen: Blood from Peripheral Updated: 07/10/21 0854     Significant Indicator NEG     Source BLD     Site PERIPHERAL     Culture Result No Growth  Note: Blood cultures are incubated for 5 days and  are monitored continuously.Positive blood cultures  are called to the RN and reported as soon as  they are identified.      Narrative:      Per Hospital Policy: Only change Specimen Src: to \"Line\" if  specified by physician order.  Right Forearm/Arm    URINALYSIS (UA) [[id number]]  (Abnormal) Collected: 07/10/21 0145    Order Status: Completed Specimen: Urine Updated: 07/10/21 0304     Color DK Yellow     Character Cloudy     Specific Gravity 1.015     Ph 5.0     Glucose Negative mg/dL      Ketones Negative mg/dL      Protein Negative mg/dL      Bilirubin Large     Urobilinogen, Urine 0.2     Nitrite Positive     Leukocyte Esterase Trace     Occult Blood Negative     Micro Urine Req Microscopic    Blood Culture [[id number]] Collected: 07/10/21 0000    Order Status: Canceled Specimen: Other from Peripheral     MRSA By PCR (Amp) [[id number]]  (Abnormal) Collected: 07/09/21 1833    Order Status: Completed " Specimen: Respirate from Nares Updated: 21     Significant Indicator POS     Source RESP     Site NARES     MRSA PCR POSITIVE for MRSA by PCR.    Narrative:      CALL  [facility]  [address] tel. [phone],  CALLED  183 tel. [phone] 2021, 22:31, RB PERF. RESULTS CALLED  TO:[id number], faxed [phone].  Collected By:[id number] JUSTO SUAREZ  Collected By:[id number] JUSTO SUAREZ    URINALYSIS [[id number]] Collected: 21 0000    Order Status: Canceled Specimen: Urine, Clean Catch         Hemodynamics:  Temp (24hrs), Av.3 °C (97.4 °F), Min:36.2 °C (97.1 °F), Max:36.4 °C (97.6 °F)  Temperature: 36.4 °C (97.6 °F)  Pulse  Av  Min: 60  Max: 104   Blood Pressure: 159/102     PICC Single Lumen Left Basilic (Active)   Site Assessment Clean;Dry;Intact 21   Line Status Scrubbed the hub prior to access;Blood return noted;Flushed;Saline locked 21   Dressing Type Biopatch;Transparent 21   Dressing Status Clean;Dry;Intact 21   Dressing Intervention N/A 21   Dressing Change Due 07/17/21 07/12/21 2000   Date Primary Tubing Changed 21   Date Secondary Tubing Changed 21   NEXT Primary Tubing Change  21   NEXT Secondary Tubing Change  21   NEXT IV Connector(s) Change 21   Line Necessity Assessed Lack of Peripheral Access 21 0800   $ Single Lumen PICC Charge Single kit used 21 2100       CVC Triple Lumen 21 Non-tunneled Left Internal jugular (Active)   Site Assessment Intact;Other (Comment) 21   Lumen 1 Status Scrubbed the hub prior to access;Blood return noted;Flushed;Normal saline locked 21   Lumen 3 Status Scrubbed the hub prior to access;Blood return noted;Flushed;Normal saline locked 21   Lumen 2 Status Scrubbed the hub prior to access;Blood return noted;Flushed;Normal saline locked 21   Dressing Type  Biopatch;Transparent 07/12/21 2000   Dressing Status Intact;Old drainage 07/12/21 2000   Dressing Intervention Dressing changed per protocol 07/11/21 2300   Dressing Change Due 07/17/21 07/12/21 2000   NEXT Primary Tubing Change  07/13/21 07/11/21 2300   NEXT Secondary Tubing Change  07/13/21 07/11/21 2300   NEXT IV Connector(s) Change 07/17/21 07/11/21 2300   Waveform Not Applicable 07/12/21 2000   Line Calibrated Not Applicable 07/12/21 2000   Line Necessity Assessed Lack of Peripheral Access 07/12/21 0800     Wound:  @WOUNDLDA(4)@     Fluids:  Intake/Output                             07/11/21 0700 - 07/12/21 0659 07/12/21 0700 - 07/13/21 0659 07/13/21 0700 - 07/14/21 0659     [id number] 0712-4216 Total 2145-2026 9605-9743 Total 9327-1125 2946-7991 Total                    Intake    P.O.  --  200 200  --  -- --  --  -- --    P.O. -- 200 200 -- -- -- -- -- --    Blood  70  -- 70  --  0 0  --  -- --    Volume (RELEASE RED BLOOD CELLS) -- -- -- -- 0 0 -- -- --    Volume (RELEASE CRYOPRECIPITATE) 70 -- 70 -- -- -- -- -- --    Total Intake 70 200 270 -- 0 0 -- -- --       Output    Urine  --  -- --  1150  1250 2400  700  -- 700    Number of Times Voided -- 2 x 2 x 2 x -- 2 x 1 x -- 1 x    Urine Void (mL) -- -- -- 1150 1250 2400 700 -- 700    Emesis  --  0 0  --  -- --  --  -- --    Emesis -- 0 0 -- -- -- -- -- --    Stool  --  -- --  --  -- --  --  -- --    Number of Times Stooled -- 2 x 2 x -- -- -- -- -- --    Total Output -- 0 0 1150 1250 2400 700 -- 700       Net I/O     70 200 270 -1150 -1250 -2400 -700 -- -700           GI/Nutrition:  Orders Placed This Encounter   Procedures   • Diet Order Diet: Cardiac     Standing Status:   Standing     Number of Occurrences:   1     Order Specific Question:   Diet:     Answer:   Cardiac [6]     Medications:  Current Facility-Administered Medications   Medication Last Admin   • labetalol (NORMODYNE/TRANDATE) injection 10 mg 10 mg at 07/12/21 1304   • fludrocortisone (FLORINEF)  tablet 0.1 mg 0.1 mg at 07/13/21 0542   • fentaNYL (SUBLIMAZE) injection 25-50 mcg 25 mcg at 07/13/21 1014   • furosemide (LASIX) injection 20 mg 20 mg at 07/13/21 0543   • mupirocin (BACTROBAN) 2 % ointment 2 Each at 07/13/21 0542   • phytonadione (AQUA-MEPHYTON) 10 mg in NS 50 mL IVPB Stopped at 07/12/21 1919   • hydrocortisone (CORTEF) tablet 20 mg 20 mg at 07/13/21 0543   • hydrocortisone (CORTEF) tablet 10 mg 10 mg at 07/12/21 1801   • nystatin (MYCOSTATIN) powder Given at 07/13/21 1154   • bacitracin ointment Given at 07/13/21 0542   • ondansetron (ZOFRAN) syringe/vial injection 4 mg     • ondansetron (ZOFRAN ODT) dispertab 4 mg     • promethazine (PHENERGAN) tablet 12.5-25 mg     • promethazine (PHENERGAN) suppository 12.5-25 mg     • prochlorperazine (COMPAZINE) injection 5-10 mg     • nystatin (MYCOSTATIN) [id number] UNIT/ML suspension 500,000 Units 500,000 Units at 07/13/21 1200   • amitriptyline (ELAVIL) tablet 10 mg 10 mg at 07/13/21 0632   • calcitonin (salmon) (MIACALCIN) nasal spray 200 Units 200 Units at 07/12/21 2035   • vitamin D (cholecalciferol) tablet 2,000 Units 2,000 Units at 07/13/21 0543   • colesevelam (WELCHOL) tablet 625 mg 625 mg at 07/13/21 0819   • DULoxetine (CYMBALTA) capsule 60 mg 60 mg at 07/12/21 2036   • gabapentin (NEURONTIN) capsule 400 mg 400 mg at 07/13/21 0543   • levothyroxine (SYNTHROID) tablet 75 mcg 75 mcg at 07/13/21 0542   • liothyronine (CYTOMEL) tablet 25 mcg 25 mcg at 07/12/21 2035   • SUMAtriptan (IMITREX) tablet 50 mg 50 mg at 07/13/21 0819   • acetaminophen (Tylenol) tablet 650 mg 650 mg at 07/11/21 0610     Medical Decision Making, by Problem:  Active Hospital Problems    Diagnosis    • *DIC (disseminated intravascular coagulation) (HCC) [D65]    • Coagulopathy (HCC) [D68.9]    • Ischemic colitis (HCC) [K55.9]    • Lower GI bleed [K92.2]    • Epistaxis [R04.0]    • Electrolyte imbalance [E87.8]    • Thrush [B37.0]    • Incontinence [R32]    • Thrombocytopenia (HCC)  [D69.6]    • Hemochromatosis [E83.119]    • Recurrent syncope [R55]    • History of pulmonary embolus (PE) [Z86.711]    • Adrenal insufficiency ([place] disease) (HCC) [E27.1]    • Lactic acidemia [E87.2]    • Chronic sinusitis [J32.9]      IMPRESSION:  1.  Thrombocytopenia.  2.  Chronic sinusitis.  3.  Diverticulosis.  4.  Abnormal liver function tests, which seemed as fatty liver.  5.  Hypothyroidism.  6.  History of congestive heart failure.  7.  Essential hypertension.  8.  Adrenal insufficiency.  9.  Depression.  10.  DVT with PE.  11.  Recent lower GI bleed.  12.  Syncope.     PLAN:  1.  Discontinued vancomycin 7/11.  2.  Discontinued meropenem 7/11.  3.  Observe off antibiotics - cardiac intensive care unit.   - Currently no clinical signs of infection  4.  Continue work with patient's other physicians.  5. No changes today     Case and treatment reviewed with patient    30 min on floor in CICU with 80% face to face view and 100% in direct patient care/counseling today.    Dr Rdz

## 2021-07-13 NOTE — CARE PLAN
Problem: Mobility Transfers  Goal: STG-Within one week, patient will transfer bed to chair [facility] [place] with LRD In order to maximize self mobility  Outcome: Discharged - Not Met     Problem: Mobility  Goal: STG-Within one week, patient will ambulate household distance of 150 feet with gait belt and SBA.  Outcome: Discharged - Not Met  Goal: STG-Within one week, patient will ambulate up/down flight of stairs with B railings, gait belt, and CGA.  Outcome: Discharged - Not Met     Problem: PT-Long Term Goals  Goal: LTG-By discharge, patient will ambulate 150 ft With LRD at [facility] in order to progress towards PLOF   Outcome: Discharged - Not Met  Goal: LTG-By discharge, patient will transfer one surface to another At [facility] I with LRD In order to maximize self mobility  Outcome: Discharged - Not Met  Goal: LTG-By discharge, patient will ambulate up/down flight of stairs With single HR at [facility] [place] in order to enter/exit home safely  Outcome: Discharged - Not Met

## 2021-07-13 NOTE — PROGRESS NOTES
"Critical Care Progress Note    Date of admission  7/9/2021    Chief Complaint  56 y.o. female with hx of [place]'s disease (on hydrocortisone and fludrocortisone), hematochromatosis, PE in 4/2021 (after COVID vaccine and now on Eliquis), hypothyroidism, chronic sinusitis (on omadacycline for total 6 months, seen by Lynnette GERMAN), hx multiple allergies, hx of gastric bypass surgery, cholecystectomy, hysterectomy, who's admitted 7/9/2021 with abdominal pain, lower GIB and thrombocytopenia. In addition, pt had hx of \"grand mal seizures\" in the past due to traumatic accidents and was on antiepileptic in the past, but not currently for \"many years\". Pt also has had episodes of syncope in the past 5 years per note. Eliquis was stopped due to lower GI bleed.  CT/CTA Abdomen/pelvis showed no active GI bleed. Severe hepatic steatosis. +anasarca, subcutaneous edema.     Hospital course including worsening LFT and bilirubin, diagnosis of DIC due to coagulopathy and hypofibrinogenemia, and pt had multiple transfusions on the floor.  Hematology was consulted for thrombocytopenia, and felt pt may have DIC with unknown underlying cause. GI was consulted for lower GI bleed. Pt had colonoscopy 6 months ago and reportedly \"normal\". Elevated LFTs were felt due to ischemic hepatopathy. Concern of ischemic colitis, but pt deemed not endoscopic candidate due to her coaguloapthy. Gen surgery was consulted and recommended medical treatment.     We were consulted because patient's case is complicated and requires a lot of nursing care on the floor.     Hospital Course  7/9 admitted to hospital  7/10 GI and Gen surgery were consulted  7/11 admitted to ICU  7/11 received 1U PRBC, 2U FFP, 1U platelet, and 1U cryo      Interval Problem Update  Reviewed last 24 hour events:  No acute events overnight.   SBP high in 140-160s  HR in 70-80s  Afebrile  On 1L NC, sat >94%  Hgb 8.2 platelet 38K  INR 1.64, fibrinogen 128  Pt's WBC is 3.8K with lymphocyte " 26%  No episodes of melena, hematochezia, hematemesis  Creatinine 0.60  Total bilirubin increasing to 12K (from 9.5)  Lactate 3.8  US RUQ with no biliary dilatation. GB is surgically absent  BM x3, watery diarrhea, nonbloody  Mobility is able to get edge of bed.     Review of Systems  Review of Systems   Constitutional: Negative for chills, fever and malaise/fatigue.   HENT: Negative for congestion.    Respiratory: Negative for cough and shortness of breath.    Cardiovascular: Negative for chest pain, palpitations, orthopnea and leg swelling.   Gastrointestinal: Positive for abdominal pain. Negative for blood in stool, diarrhea, melena, nausea and vomiting.   Musculoskeletal: Negative for back pain, falls and joint pain.   Skin: Negative for rash.   Neurological: Negative for seizures, weakness and headaches.   Psychiatric/Behavioral: The patient is not nervous/anxious.    All other systems reviewed and are negative.       Vital Signs for last 24 hours   Temp:  [36 °C (96.8 °F)-36.3 °C (97.4 °F)] 36.3 °C (97.4 °F)  Pulse:  [60-86] 78  Resp:  [12-26] 20  BP: (111-185)/() 149/77  SpO2:  [82 %-98 %] 94 %    Hemodynamic parameters for last 24 hours       Respiratory Information for the last 24 hours       Physical Exam   Physical Exam  Vitals and nursing note reviewed.   Constitutional:       General: She is not in acute distress.     Appearance: She is not ill-appearing, toxic-appearing or diaphoretic.   HENT:      Head: Normocephalic.   Cardiovascular:      Rate and Rhythm: Normal rate and regular rhythm.      Pulses: Normal pulses.      Heart sounds: Normal heart sounds. No murmur heard.     Pulmonary:      Effort: No respiratory distress.      Breath sounds: Normal breath sounds. No wheezing, rhonchi or rales.   Abdominal:      General: There is no distension.      Palpations: Abdomen is soft.      Tenderness: There is no abdominal tenderness. There is no guarding.   Musculoskeletal:      Cervical back: Neck  supple.      Right lower leg: No edema.      Left lower leg: No edema.   Skin:     Coloration: Skin is not jaundiced.      Findings: No bruising or rash.   Neurological:      General: No focal deficit present.      Mental Status: She is alert and oriented to person, place, and time.      Comments: Moving all extremities, no focal neuro deficit   Psychiatric:         Mood and Affect: Mood normal.         Behavior: Behavior normal.         Medications  Current Facility-Administered Medications   Medication Dose Route Frequency Provider Last Rate Last Admin   • labetalol (NORMODYNE/TRANDATE) injection 10 mg  10 mg Intravenous Q4HRS PRN Rich Linda D.O.   10 mg at 07/12/21 1304   • fludrocortisone (FLORINEF) tablet 0.1 mg  0.1 mg Oral BID Rich Linda D.O.   0.1 mg at 07/13/21 0542   • fentaNYL (SUBLIMAZE) injection 25-50 mcg  25-50 mcg Intravenous Q2HRS PRN Rich Linda D.O.   25 mcg at 07/12/21 1208   • furosemide (LASIX) injection 20 mg  20 mg Intravenous Q8HRS Rich Linda D.O.   20 mg at 07/13/21 0543   • mupirocin (BACTROBAN) 2 % ointment   Topical BID ANIL Camilo.O.   2 Each at 07/13/21 0542   • phytonadione (AQUA-MEPHYTON) 10 mg in NS 50 mL IVPB  10 mg Intravenous DAILY ANIL Camilo.O.   Stopped at 07/12/21 1919   • hydrocortisone (CORTEF) tablet 20 mg  20 mg Oral QAM Damari Tran M.D.   20 mg at 07/13/21 0543   • hydrocortisone (CORTEF) tablet 10 mg  10 mg Oral Q EVENING Damari Tran M.D.   10 mg at 07/12/21 1801   • nystatin (MYCOSTATIN) powder   Topical TID ANIL Camilo.OJelani   Given at 07/13/21 0543   • bacitracin ointment   Topical BID GARRY Luis M.D.   Given at 07/13/21 0542   • ondansetron (ZOFRAN) syringe/vial injection 4 mg  4 mg Intravenous Q4HRS PRN Ezequiel Zawahiri, M.D.       • ondansetron (ZOFRAN ODT) dispertab 4 mg  4 mg Oral Q4HRS PRN Ezequiel Quinonez M.D.       • promethazine (PHENERGAN) tablet 12.5-25 mg  12.5-25 mg Oral Q4HRS PRN Ezequiel Quinonez M.D.       • promethazine  (PHENERGAN) suppository 12.5-25 mg  12.5-25 mg Rectal Q4HRS PRN Ezequiel Quinonez M.D.       • prochlorperazine (COMPAZINE) injection 5-10 mg  5-10 mg Intravenous Q4HRS PRN Ezequiel Quinonez M.D.       • nystatin (MYCOSTATIN) 418451 UNIT/ML suspension 500,000 Units  5 mL Swish & Swallow 4X/DAY Ezequiel Quinonez M.D.   500,000 Units at 07/12/21 2034   • amitriptyline (ELAVIL) tablet 10 mg  10 mg Oral DAILY Ezequiel Quinonez M.D.   10 mg at 07/13/21 0632   • calcitonin (salmon) (MIACALCIN) nasal spray 200 Units  1 Spray Nasal QHS Ezequiel Quinonez M.D.   200 Units at 07/12/21 2035   • vitamin D (cholecalciferol) tablet 2,000 Units  2,000 Units Oral DAILY Ezequiel Quinonez M.D.   2,000 Units at 07/13/21 0543   • colesevelam (WELCHOL) tablet 625 mg  625 mg Oral TID Ezequiel Quinonez M.D.   625 mg at 07/12/21 2034   • DULoxetine (CYMBALTA) capsule 60 mg  60 mg Oral QHS Ezequiel Quinonez M.D.   60 mg at 07/12/21 2036   • gabapentin (NEURONTIN) capsule 400 mg  400 mg Oral BID Ezequiel Quinonez M.D.   400 mg at 07/13/21 0543   • levothyroxine (SYNTHROID) tablet 75 mcg  75 mcg Oral AM ES Ezequiel Quinonez M.D.   75 mcg at 07/13/21 0542   • liothyronine (CYTOMEL) tablet 25 mcg  25 mcg Oral QHS Ezequiel Quinonez M.D.   25 mcg at 07/12/21 2035   • SUMAtriptan (IMITREX) tablet 50 mg  50 mg Oral Q2HRS PRN Ezequiel Quinonez M.D.   50 mg at 07/12/21 1040   • acetaminophen (Tylenol) tablet 650 mg  650 mg Oral Q4HRS PRN Eligio Noble D.O.   650 mg at 07/11/21 0610       Fluids    Intake/Output Summary (Last 24 hours) at 7/13/2021 0719  Last data filed at 7/13/2021 0600  Gross per 24 hour   Intake 0 ml   Output 2400 ml   Net -2400 ml       Laboratory          Recent Labs     07/10/21  1117 07/10/21  2225 07/11/21  0130 07/11/21  0720 07/11/21  1546 07/12/21  0523 07/13/21  0557   SODIUM 132*   < > 139   < > 137 139 140   POTASSIUM 5.3   < > 5.0   < > 5.0 4.8 4.2   CHLORIDE 96   < > 100   < > 98 100 100   CO2 22   < > 23    < > 22 25 27   BUN 15   < > 14   < > 14 14 18   CREATININE 1.18   < > 0.98   < > 0.86 0.81 0.60   MAGNESIUM 1.7  --  1.9  --   --   --   --    PHOSPHORUS 4.8*  --  4.8*  --   --   --   --    CALCIUM 7.0*   < > 8.0*   < > 8.7 8.9 8.5    < > = values in this interval not displayed.     Recent Labs     07/10/21  1117 07/10/21  2225 07/11/21  0720 07/11/21  0720 07/11/21  1249 07/11/21  1546 07/12/21  0523 07/12/21  0755 07/13/21  0557   ALTSGPT 64*  --  41  --   --   --  42  --  54*   ASTSGOT 74*  --  45  --   --   --  55*  --  114*   ALKPHOSPHAT 293*  --  209*  --   --   --  236*  --  283*   TBILIRUBIN 7.1*  --  8.6*   < >  --   --  9.5* 9.9* 12.3*   DBILIRUBIN 6.3*  --   --   --   --   --   --  7.2*  --    LIPASE 52  --   --   --   --   --   --   --   --    GLUCOSE 108*   < > 122*  118*  --    < > 130* 125*  --  126*    < > = values in this interval not displayed.     Recent Labs     07/11/21  0720 07/11/21  1249 07/12/21  0523 07/12/21  0523 07/12/21  0755 07/12/21  1205 07/12/21  1935 07/13/21  0557   WBC 3.8*   < > 3.9*   < >  --  3.7* 3.0* 3.6*   NEUTSPOLYS 71.10   < > 71.60   < >  --  69.50 65.60 59.80   LYMPHOCYTES 19.60*   < > 16.20*   < >  --  18.20* 21.70* 26.30   MONOCYTES 8.50   < > 11.10   < >  --  11.20 11.70 11.40   EOSINOPHILS 0.00   < > 0.00   < >  --  0.00 0.00 0.00   BASOPHILS 0.30   < > 0.30   < >  --  0.30 0.00 0.30   ASTSGOT 45  --  55*  --   --   --   --  114*   ALTSGPT 41  --  42  --   --   --   --  54*   ALKPHOSPHAT 209*  --  236*  --   --   --   --  283*   TBILIRUBIN 8.6*   < > 9.5*  --  9.9*  --   --  12.3*    < > = values in this interval not displayed.     Recent Labs     07/10/21  1117 07/11/21  0130 07/11/21  0720 07/11/21  1249 07/11/21  1546 07/11/21  2120 07/12/21  0523 07/12/21  0523 07/12/21  1205 07/12/21  1935 07/13/21  0557   RBC  --    < > 2.63*   < >  --   --  2.90*   < > 3.10* 2.85* 2.72*   HEMOGLOBIN 10.1*   < > 8.0*   < >  --    < > 8.7*   < > 9.3* 8.7* 8.2*   HEMATOCRIT  29.6*   < > 24.2*   < >  --    < > 25.8*   < > 27.6* 26.0* 24.9*   PLATELETCT  --    < > 26*   < >  --   --  65*   < > 64* 54* 38*   PROTHROMBTM 23.9*  --  23.2*   < > 20.7*  --  19.2*  --   --   --  18.9*   APTT 51.1*  --  43.4*  --  37.5*  --   --   --   --   --   --    INR 2.22*  --  2.14*   < > 1.84*  --  1.67*  --   --   --  1.64*    < > = values in this interval not displayed.       Imaging  X-Ray:  I have personally reviewed the images and compared with prior images.  CT:    Reviewed    Assessment/Plan  * Coagulopathy (HCC)  Assessment & Plan  I don't believe patient is in DIC. Though from laboratory standpoint, pt does exhibit thrombocytopenia, hypofibrinogenemia, prolonged INR and PTT, but this is all in the setting of liver cirrhosis. No clinical evidence of active bleeding. TEG is not impressive and  no evidence of lysis. Retic is normal.   7/11 patient was transfused 1U PRBC, 2U FFP, 1U platelet, and 1U cryo   Monitoring coags closely.   Transfusion parameters: Hgb <7, plt <20k, INR >2, fibrinogen <100.       Hepatic steatosis  Assessment & Plan  Severe hepatic steatosis noted on CT A/P, concerning if she has worsening liver dysfunction due to this.   Upon more conversation with ,  reported she has hx of alcohol use in the past 10 years but in the past 2 years have been drinking more alcohol. He did say that she quit drinking 2-3 months ago.   ?undiagnosed alcoholic cirrhosis in addition to her MCKEON +/- hemochromatosis if this diagnosis is really true.   Overall, I'm concern more of her picture is a manifestation of decompensating liver cirrhosis leading to coagulopathy.   Will give vitamin K 10mg IV daily x 3 days  Monitor coagulopathy  Frequent CBC  Check anti smooth muscle Ab, LKM, anti mitochondrial  D/w GI Dr. Read    Lower GI bleed- (present on admission)  Assessment & Plan  -Resolved at this time, no evidence of melena since admission.   -Patient has received multiple blood  products including 1U PRBC, 2U FFP and 1U plt  -No plan for colonoscopy at this time.   Monitor hemodynamics and CBC.   Transfuse only when Hgb <7    Thrombocytopenia (HCC)- (present on admission)  Assessment & Plan  I suspect it's her liver related thrombocytopenia.   Hem/Onc felt not ITP or TTP but DIC is possible per Hem/Onc  Pt not presenting herself as sepsis at this time.   Monitor closely, transfuse only when pt rebleeds and plt <20K      Hemochromatosis- (present on admission)  Assessment & Plan  I question the validity of this diagnosis as both patient and  couldn't tell me the details about this diagnosis.   CT A/P showed severe hepatic steatosis.       Recurrent syncope- (present on admission)  Assessment & Plan  Chronic problem due to orthostatic hypotension. Unclear to me what her underlying chronic condition that led to her recurrent orthostatic hypotension.   Spot EEG was done and negative. Pt was awake and alert at time of study. Felt low utility at this time.         History of pulmonary embolus (PE)- (present on admission)  Assessment & Plan  No active respiratory symptoms      Adrenal insufficiency ([place]'s disease) (HCC)- (present on admission)  Assessment & Plan  Continue home dose of cortef 20mg in AM and 10mg PM.   Add home dose fludrocoritisone  Pt is also on somatostatin? (will hold for now)      Lactic acidemia- (present on admission)  Assessment & Plan  -Unlikely due to sepsis  -Primarily due to her liver dysfunction at this time  - Given liver dysfunction, will be poor clearance of her lactic acidosis    Chronic sinusitis- (present on admission)  Assessment & Plan  All antibiotics have been discontinued.   Appreciate ID input       VTE:  Contraindicated  Ulcer: Not Indicated  Lines: None    I have performed a physical exam and reviewed and updated ROS and Plan today (7/13/2021). In review of yesterday's note (7/12/2021), there are no changes except as documented above.      Patient has a complex comorbidities with evidence of coagulopathy and acute rise of LFT in the past week. Overall still unclear etiology     Discussed patient condition and risk of morbidity and/or mortality with Family, RN, RT, Pharmacy, Charge nurse / hot rounds and Patient  D/w Dr. Donald, HemOnc  D/w Dr. Read, GI    Patient can be transferred out of ICU from my standpoint  D/w Dr. Santizo, [employer] attending.

## 2021-07-13 NOTE — PROGRESS NOTES
Lab called with critical result of platelets at 1030. Critical lab result read back to .   Dr. Linda notified of critical lab result at 1040.  Critical lab result read back by Dr. Lidna.

## 2021-07-13 NOTE — CARE PLAN
Problem: Nutritional:  Goal: Achieve adequate nutritional intake  Description: Patient will consume >50% of meals  Outcome: Progressing    Minimal PO intake in ADs with 1 meal 0% and 1 meal 50-75%. Pt seen at bedside, reports is eating better with improvement of thrush. Pt ate 50% of eggs and hot chocolate at breakfast.

## 2021-07-13 NOTE — CARE PLAN
Problem: Pain - Standard  Goal: Alleviation of pain or a reduction in pain to the patient’s comfort goal  Outcome: Progressing  Note: Medications in place for migraines and abdominal pain. IV Fentanyl worked per patient     Problem: Skin Integrity  Goal: Skin integrity is maintained or improved  Outcome: Progressing  Note: Q 2 hour turns in place, waffle overlay in place, heel mepilex, floated heels   The patient is Watcher - Medium risk of patient condition declining or worsening    Shift Goals  Clinical Goals: derease synopial event, labs w/nl    Progress made toward(s) clinical / shift goals:  no syncopal events today     Patient is not progressing towards the following goals:

## 2021-07-13 NOTE — WOUND TEAM
[facility]  Inpatient Services  Initial Wound and Skin Care Evaluation    Admission Date: 7/9/2021     Last order of IP CONSULT TO WOUND CARE was found on 7/9/2021 from Hospital Encounter on 7/9/2021     HPI, PMH, SH: Reviewed    Past Surgical History:   Procedure Laterality Date   • HARDWARE REMOVAL ORTHO Right 3/17/2021    Procedure: REMOVAL, HARDWARE - SYNDESMOTIC SCREW OF ANKLE.;  Surgeon: William Srinivasan M.D.;  Location: SURGERY [facility];  Service: Orthopedics   • ANKLE ORIF Right 1/27/2021    Procedure: ORIF, ANKLE;  Surgeon: William Srinivasan M.D.;  Location: SURGERY [facility];  Service: Orthopedics   • ESOPHAGEAL MOTILITY OR MANOMETRY N/A 8/19/2020    Procedure: MOTILITY STUDY, ESOPHAGUS, USING MANOMETRY;  Surgeon: Jamel Oden M.D.;  Location: ENDOSCOPY [facility];  Service: Gastroenterology   • PB FUSION FOOT BONES,TRIPLE Right 7/14/2020    Procedure: FUSION, JOINT, HINDFOOT, TRIPLE - WITH POSSIBLE GASTROC RECESSION;  Surgeon: Wm Librado Mercedes M.D.;  Location: SURGERY [facility];  Service: Orthopedics   • CYST EXCISION  05/2020    right frontal tempral sinus   • SHOULDER DECOMPRESSION ARTHROSCOPIC Left 2/19/2019    Procedure: SHOULDER DECOMPRESSION ARTHROSCOPIC - SUBACROMIAL;  Surgeon: Camila Elkins M.D.;  Location: [facility];  Service: Orthopedics   • CLAVICLE DISTAL EXCISION Left 2/19/2019    Procedure: CLAVICLE DISTAL EXCISION;  Surgeon: Camila Elkins M.D.;  Location: [facility];  Service: Orthopedics   • SHOULDER ARTHROSCOPY W/ BICIPITAL TENODESIS REPAIR Left 2/19/2019    Procedure: SHOULDER ARTHROSCOPY W/ BICIPITAL TENODESIS REPAIR;  Surgeon: Camila Elkins M.D.;  Location: [facility];  Service: Orthopedics   • GASTROSCOPY N/A 2/7/2019    Procedure: GASTROSCOPY- DIALATION AND BIOPSY;  Surgeon: Hola Velzi M.D.;  Location: SURGERY [facility];  Service: Gastroenterology   • ABDOMINAL  EXPLORATION  2002   • GASTRIC BYPASS LAPAROSCOPIC  1999   • HYSTERECTOMY, TOTAL ABDOMINAL  1995   • MANDIBLE FRACTURE ORIF  1983   • APPENDECTOMY  1974   • GYN SURGERY     • OTHER ORTHOPEDIC SURGERY       Social History     Tobacco Use   • Smoking status: Never Smoker   • Smokeless tobacco: Never Used   Substance Use Topics   • Alcohol use: Yes     Chief Complaint   Patient presents with   • Bloody Stools     BIB EMS from rehab facility for bloody stools. Most recent CBC resulted platelet count of 18. Pt had episode of syncope at 1105 that lasted approximately 20 sec. Pt states LOC is common for her.   • Loss of Consciousness     Pt had episode of syncope at 1105 that lasted approximately 20 sec. Pt states LOC is common for her.     Diagnosis: GI bleed [K92.2]    Unit where seen by Wound Team: T803/02     WOUND CONSULT/FOLLOW UP RELATED TO: perineum, bilateral heels    WOUND HISTORY:  Patient admitted for bloody stools and thrombocytopenia. With regard to wounds, patient reported that the partial thickness linear wounds to her fingers were due to an allergic reaction to soap and her fingers started to peel. Wounds to her forearms were due to dog scratches and bites, however due to anasarca, wounds are weeping.     WOUND ASSESSMENT/LDA        Wound 05/10/21 Pressure Injury Heel Bilateral  & left lateral foot POA DTI's (Active)   Wound Image      07/12/21 1400   Site Assessment Purple;Red 07/12/21 1400   Periwound Assessment Edema 07/12/21 1400   Margins Attached edges;Undefined edges 07/12/21 1400   Drainage Amount Small 07/12/21 1400   Drainage Description Serous 07/12/21 1400   Treatments Cleansed;Site care;Offloading 07/12/21 1400   Wound Cleansing Approved Wound Cleanser 07/12/21 1400   Dressing Cleansing/Solutions Not Applicable 07/12/21 1400   Dressing Options Mepilex Heel 07/12/21 1400   Dressing Changed Reinforced 07/12/21 1400   Dressing Status Clean;Dry;Intact 07/12/21 1400   Dressing Change/Treatment  Frequency Every 72 hrs, and As Needed 07/12/21 1400   NEXT Dressing Change/Treatment Date 07/14/21 07/12/21 1400   NEXT Weekly Photo (Inpatient Only) 07/19/21 07/12/21 1400   Pressure Injury Stage DTPI 07/12/21 1400   WOUND NURSE ONLY - Time Spent with Patient (mins) 45 07/12/21 1400     Moisture Associated Skin Damage 07/12/21 Groin;Perineum;Buttock (Active)   NEXT Weekly Photo (Inpatient Only) 07/19/21 07/12/21 1400   Drainage Amount Scant 07/12/21 1400   Drainage Description Serous 07/12/21 1400   Periwound Assessment Red;Pink 07/12/21 1400   IAD Cleansing Foam Cleanser/Washcloth 07/12/21 1400   Periwound Protectant Antifungal Therapy 07/12/21 1400   IAD Containment Device Purewick 07/12/21 1400   WOUND NURSE ONLY - Time Spent with Patient (mins) 45 07/12/21 1400       Vascular:    TANNER:   No results found.    Lab Values:    Lab Results   Component Value Date/Time    WBC 3.7 (L) 07/12/2021 12:05 PM    RBC 3.10 (L) 07/12/2021 12:05 PM    HEMOGLOBIN 9.3 (L) 07/12/2021 12:05 PM    HEMATOCRIT 27.6 (L) 07/12/2021 12:05 PM    CREACTPROT 5.68 (H) 07/10/2021 11:17 AM    SEDRATEWES 6 07/10/2021 11:17 AM    HBA1C 6.0 (H) 06/29/2021 06:06 AM        Culture Results show:  Recent Results (from the past 720 hour(s))   CULTURE WOUND W/ GRAM STAIN    Collection Time: 07/07/21 11:40 AM    Specimen: Head; Wound   Result Value Ref Range    Significant Indicator POS (POS)     Source WND     Site Sinus     Culture Result Light growth usual upper respiratory derick. (A)     Gram Stain Result       Rare epithelial cells.  Moderate Gram negative rods.      Culture Result Klebsiella pneumoniae  Moderate growth   (A)        Susceptibility    Klebsiella pneumoniae - DOYLE     Ceftriaxone <=1 Sensitive mcg/mL     Cefazolin 4 Sensitive mcg/mL     Ciprofloxacin <=0.25 Sensitive mcg/mL     Cefepime <=2 Sensitive mcg/mL     Cefuroxime 16 Intermediate mcg/mL     Ampicillin/sulbactam 8/4 Sensitive mcg/mL     Ertapenem <=0.5 Sensitive mcg/mL      Tobramycin <=2 Sensitive mcg/mL     Gentamicin <=2 Sensitive mcg/mL     Moxifloxacin <=2 Sensitive mcg/mL     Pip/Tazobactam <=8 Sensitive mcg/mL     Trimeth/Sulfa <=0.5/9.5 Sensitive mcg/mL     Tigecycline >4 Resistant mcg/mL       Pain Level/Medicated:  Denies pain with regard to wound care       INTERVENTIONS BY WOUND TEAM:  Chart and images reviewed. Discussed with bedside RN. All areas of concern (based on picture review, LDA review and discussion with bedside RN) have been thoroughly assessed. Documentation of areas based on significant findings. This RN in to assess patient. Performed standard wound care which includes appropriate positioning, dressing removal and non-selective debridement. Pictures and measurements obtained weekly if/when required.    Bilateral heels   Preparation for Dressing removal: NA  Cleansed with:  wound cleanser and gauze.  Sharp debridement: NA  Neela wound: Cleansed with wound cleanser and gauze  Primary Dressing: heel mepilex  Secondary (Outer) Dressing: n/a    ** will order bacitracin for fingers and keep them open to air **     ** nystatin powder ordered for bilateral groin, in between thighs, and labias, interdry applied to groin  **     Interdisciplinary consultation: Patient, Bedside RN     EVALUATION / RATIONALE FOR TREATMENT:  Most Recent Date:  7/12/21: patient bilateral heels with POA DTI's, left heel starting to possibly evolve. Heel mepilex to offload. Orders in place for BUE already. Antifungal treatment powder for MASD to groin, perineum, buttocks. Nursing to reconsult if areas worsening    7/10: Patient with anasarca thus shallow wounds to forearms are weeping. Hydrofiber silver and roll gauze applied over scattered small wound beds to manage moisture from drainage without causing further skin breakdown. Patient's bilateral groin and vaginal area edematous and with blanching erythema, likely a combination of increased moisture and yeast which now caused dermatitis to  area. Nystatin powder applied and interdry placed for moisture management. Wounds to fingers were cleansed but left open to air, will order bacitracin.      Goals: Steady decrease in wound area and depth weekly.    WOUND TEAM PLAN OF CARE ([X] for frequency of wound follow up,):   Nursing to follow orders written for wound care. Contact wound team if area fails to progress, deteriorates or with any questions/concerns  Dressing changes by wound team:                   Follow up 3 times weekly:                NPWT change 3 times weekly:     Follow up 1-2 times weekly:      Follow up Bi-Monthly:                   Follow up as needed:   X  Other (explain):     NURSING PLAN OF CARE ORDERS (X):  Dressing changes: See Dressing Care orders: X  Skin care: See Skin Care orders: X  RN Prevention Protocol: X  Rectal tube care: See Rectal Tube Care orders:   Other orders:    RSKIN:   CURRENTLY IN PLACE (X), APPLIED THIS VISIT (A), ORDERED (O):   Q shift Matthew:  X  Q shift pressure point assessments:  X    Surface/Positioning   Pressure redistribution mattress            Low Airloss          Bariatric foam      Bariatric CHEVY     Waffle cushion        Waffle Overlay        X  Reposition q 2 hours    X  TAPs Turning system   O  Z Jack Pillow     Offloading/Redistribution   Sacral Mepilex (Silicone dressing)     Heel Mepilex (Silicone dressing)         Heel float boots (Prevalon boot)             Float Heels off Bed with Pillows           Respiratory   Silicone O2 tubing       O  Gray Foam Ear protectors   O  Cannula fixation Device (Tender )          High flow offloading Clip    Elastic head band offloading device      Anchorfast                                                         Trach with Optifoam split foam             Containment/Moisture Prevention   Rectal tube or BMS    Purwick/Condom Cath        Perez Catheter    Barrier wipes           Barrier paste     X  Antifungal tx    X  Interdry    X    Mobilization DONNA  Up  to chair        Ambulate      PT/OT      Nutrition DONNA     Dietician        Diabetes Education      PO     TF     TPN     NPO   # days     Other        Anticipated discharge plans: TBD   LTACH:        SNF/Rehab:                  Home Health Care:           Outpatient Wound Center:            Self/Family Care:        Other:

## 2021-07-13 NOTE — PROGRESS NOTES
Gastroenterology Progress Note     Author: Charlie Read M.D.   Date & Time Created: 7/13/2021 4:48 PM    Chief Complaint:  Elevated LFTs, hematochezia    Interval History:  Patient reports abdominal pain today. She reports again no history of liver disease or ETOH use but her  reported outside of the room that she has been drinking up to 1/5 of vodka/whiskey/fireball every 2 days. Her ETOH intake has actually increased    Review of Systems:  Review of Systems   Constitutional: Positive for chills. Negative for fever.   Respiratory: Negative for shortness of breath.    Cardiovascular: Negative for chest pain.   Gastrointestinal: Positive for abdominal pain. Negative for blood in stool, constipation, diarrhea, heartburn, melena, nausea and vomiting.       Physical Exam:  Physical Exam  Constitutional:       Appearance: She is obese. She is ill-appearing.   Abdominal:      General: Abdomen is flat. Bowel sounds are normal. There is no distension.      Palpations: Abdomen is soft.      Tenderness: There is abdominal tenderness.   Neurological:      Mental Status: She is alert.         Labs:          Recent Labs     07/11/21  0130 07/11/21  0720 07/11/21  1546 07/12/21  0523 07/13/21  0557   SODIUM 139   < > 137 139 140   POTASSIUM 5.0   < > 5.0 4.8 4.2   CHLORIDE 100   < > 98 100 100   CO2 23   < > 22 25 27   BUN 14   < > 14 14 18   CREATININE 0.98   < > 0.86 0.81 0.60   MAGNESIUM 1.9  --   --   --   --    PHOSPHORUS 4.8*  --   --   --   --    CALCIUM 8.0*   < > 8.7 8.9 8.5    < > = values in this interval not displayed.     Recent Labs     07/11/21  0720 07/11/21  0720 07/11/21  1249 07/11/21  1546 07/12/21  0523 07/12/21  0755 07/13/21  0557   ALTSGPT 41  --   --   --  42  --  54*   ASTSGOT 45  --   --   --  55*  --  114*   ALKPHOSPHAT 209*  --   --   --  236*  --  283*   TBILIRUBIN 8.6*   < >  --   --  9.5* 9.9* 12.3*   DBILIRUBIN  --   --   --   --   --  7.2*  --    GLUCOSE 122*  118*  --    < > 130*  125*  --  126*    < > = values in this interval not displayed.     Recent Labs     21  0720 21  1249 21  1546 21  12021  0557 21  1010   RBC 2.63*   < >  --   --  2.90*   < > 2.85* 2.72* 2.82*   HEMOGLOBIN 8.0*   < >  --    < > 8.7*   < > 8.7* 8.2* 8.6*   HEMATOCRIT 24.2*   < >  --    < > 25.8*   < > 26.0* 24.9* 25.7*   PLATELETCT 26*   < >  --   --  65*   < > 54* 38* 32*   PROTHROMBTM 23.2*   < > 20.7*  --  19.2*  --   --  18.9*  --    APTT 43.4*  --  37.5*  --   --   --   --   --   --    INR 2.14*   < > 1.84*  --  1.67*  --   --  1.64*  --     < > = values in this interval not displayed.     Recent Labs     21  0721  07521  0557 21  1010   WBC 3.8*   < > 3.9*  --    < > 3.0* 3.6* 3.6*   NEUTSPOLYS 71.10   < > 71.60  --    < > 65.60 59.80 63.20   LYMPHOCYTES 19.60*   < > 16.20*  --    < > 21.70* 26.30 23.70   MONOCYTES 8.50   < > 11.10  --    < > 11.70 11.40 10.00   EOSINOPHILS 0.00   < > 0.00  --    < > 0.00 0.00 0.00   BASOPHILS 0.30   < > 0.30  --    < > 0.00 0.30 0.30   ASTSGOT 45  --  55*  --   --   --  114*  --    ALTSGPT 41  --  42  --   --   --  54*  --    ALKPHOSPHAT 209*  --  236*  --   --   --  283*  --    TBILIRUBIN 8.6*   < > 9.5* 9.9*  --   --  12.3*  --     < > = values in this interval not displayed.     Hemodynamics:  Temp (24hrs), Av.3 °C (97.4 °F), Min:36.2 °C (97.1 °F), Max:36.4 °C (97.6 °F)  Temperature: 36.4 °C (97.6 °F)  Pulse  Av.1  Min: 60  Max: 104   Blood Pressure: (!) 166/79     Respiratory:    Respiration: 17, Pulse Oximetry: 99 %        RUL Breath Sounds: Clear, RML Breath Sounds: Diminished, RLL Breath Sounds: Diminished, TRISTAN Breath Sounds: Clear, LLL Breath Sounds: Diminished  Fluids:    Intake/Output Summary (Last 24 hours) at 2021 1648  Last data filed at 2021 1400  Gross per 24 hour   Intake 0  ml   Output 2300 ml   Net -2300 ml        GI/Nutrition:  Orders Placed This Encounter   Procedures   • Diet Order Diet: Cardiac     Standing Status:   Standing     Number of Occurrences:   1     Order Specific Question:   Diet:     Answer:   Cardiac [6]     Medical Decision Making, by Problem:  Active Hospital Problems    Diagnosis    • *Coagulopathy (HCC) [D68.9]    • Hepatic steatosis [K76.0]    • DIC (disseminated intravascular coagulation) (HCC) [D65]    • Ischemic colitis (HCC) [K55.9]    • Lower GI bleed [K92.2]    • Epistaxis [R04.0]    • Electrolyte imbalance [E87.8]    • Thrush [B37.0]    • Incontinence [R32]    • Thrombocytopenia (HCC) [D69.6]    • Hemochromatosis [E83.119]    • Recurrent syncope [R55]    • History of pulmonary embolus (PE) [Z86.711]    • Adrenal insufficiency ([place]in's disease) (HCC) [E27.1]    • Lactic acidemia [E87.2]    • Chronic sinusitis [J32.9]        Plan:  Elevated LFTs and hematochezia - initially thought current clinical picture was related to DIC. There has been more information obtained about her liver including significant ETOH use. She has also been donating blood regularly at the insistence of her doctors to help treat hemochromatosis. She says that she did have the genetic evaluation for hemochromatosis and this was positive. She has been found to have fatty liver on US and LFTs have been variable. Currently bilirubin is the highest she has had. Certainly cirrhosis with portal hypertension and splenic sequestration could be an alternative diagnosis. Evidence against this are the normal platelets and normal LFTs earlier this year along with US showing normal directional flow in the portal vein. She likely would need a liver biopsy to confirm the presence of cirrhosis. I do not believe that cirrhosis should cause platelets to range up and down from the teens to the 300's in the matter of a few months. We will evaluate the liver in more detail with liver elastography and  MCKEON fibrosure blood test. We will check her current iron and ferritin panel. Continue supportive care for now. I am concerned that even with a diagnosis of cirrhosis this would not help with treatment and maintenance of normal platelet counts. We will continue to follow along     Quality-Core Measures

## 2021-07-14 ENCOUNTER — APPOINTMENT (OUTPATIENT)
Dept: RADIOLOGY | Facility: MEDICAL CENTER | Age: 56
DRG: 441 | End: 2021-07-14
Attending: INTERNAL MEDICINE
Payer: MEDICARE

## 2021-07-14 LAB
AFP-TM SERPL-MCNC: 1 NG/ML (ref 0–9)
ALBUMIN SERPL BCP-MCNC: 3.1 G/DL (ref 3.2–4.9)
ALBUMIN/GLOB SERPL: 3.1 G/DL
ALP SERPL-CCNC: 366 U/L (ref 30–99)
ALT SERPL-CCNC: 71 U/L (ref 2–50)
ANION GAP SERPL CALC-SCNC: 10 MMOL/L (ref 7–16)
ANISOCYTOSIS BLD QL SMEAR: ABNORMAL
ANISOCYTOSIS BLD QL SMEAR: ABNORMAL
AST SERPL-CCNC: 171 U/L (ref 12–45)
BACTERIA BLD CULT: NORMAL
BASOPHILS # BLD AUTO: 0 % (ref 0–1.8)
BASOPHILS # BLD AUTO: 0 % (ref 0–1.8)
BASOPHILS # BLD: 0 K/UL (ref 0–0.12)
BASOPHILS # BLD: 0 K/UL (ref 0–0.12)
BILIRUB SERPL-MCNC: 17.8 MG/DL (ref 0.1–1.5)
BUN SERPL-MCNC: 17 MG/DL (ref 8–22)
CALCIUM SERPL-MCNC: 8.2 MG/DL (ref 8.5–10.5)
CHLORIDE SERPL-SCNC: 98 MMOL/L (ref 96–112)
CO2 SERPL-SCNC: 34 MMOL/L (ref 20–33)
CREAT SERPL-MCNC: 0.34 MG/DL (ref 0.5–1.4)
DACRYOCYTES BLD QL SMEAR: NORMAL
EOSINOPHIL # BLD AUTO: 0 K/UL (ref 0–0.51)
EOSINOPHIL # BLD AUTO: 0.04 K/UL (ref 0–0.51)
EOSINOPHIL NFR BLD: 0 % (ref 0–6.9)
EOSINOPHIL NFR BLD: 0.5 % (ref 0–6.9)
ERYTHROCYTE [DISTWIDTH] IN BLOOD BY AUTOMATED COUNT: 59.1 FL (ref 35.9–50)
ERYTHROCYTE [DISTWIDTH] IN BLOOD BY AUTOMATED COUNT: 59.7 FL (ref 35.9–50)
FIBRINOGEN PPP-MCNC: 134 MG/DL (ref 215–460)
FSP PPP-MCNC: <5 UG/ML
GIANT PLATELETS BLD QL SMEAR: NORMAL
GLOBULIN SER CALC-MCNC: 1 G/DL (ref 1.9–3.5)
GLUCOSE SERPL-MCNC: 135 MG/DL (ref 65–99)
HCT VFR BLD AUTO: 25 % (ref 37–47)
HCT VFR BLD AUTO: 25.5 % (ref 37–47)
HGB BLD-MCNC: 8.3 G/DL (ref 12–16)
HGB BLD-MCNC: 8.5 G/DL (ref 12–16)
HYPOCHROMIA BLD QL SMEAR: ABNORMAL
INR PPP: 1.66 (ref 0.87–1.13)
LACTATE BLD-SCNC: 3.3 MMOL/L (ref 0.5–2)
LYMPHOCYTES # BLD AUTO: 2.34 K/UL (ref 1–4.8)
LYMPHOCYTES # BLD AUTO: 3.58 K/UL (ref 1–4.8)
LYMPHOCYTES NFR BLD: 31.2 % (ref 22–41)
LYMPHOCYTES NFR BLD: 55.1 % (ref 22–41)
MACROCYTES BLD QL SMEAR: ABNORMAL
MACROCYTES BLD QL SMEAR: ABNORMAL
MANUAL DIFF BLD: NORMAL
MANUAL DIFF BLD: NORMAL
MCH RBC QN AUTO: 29.7 PG (ref 27–33)
MCH RBC QN AUTO: 29.9 PG (ref 27–33)
MCHC RBC AUTO-ENTMCNC: 33.2 G/DL (ref 33.6–35)
MCHC RBC AUTO-ENTMCNC: 33.3 G/DL (ref 33.6–35)
MCV RBC AUTO: 89.6 FL (ref 81.4–97.8)
MCV RBC AUTO: 89.8 FL (ref 81.4–97.8)
METAMYELOCYTES NFR BLD MANUAL: 0.9 %
MICROCYTES BLD QL SMEAR: ABNORMAL
MONOCYTES # BLD AUTO: 0.35 K/UL (ref 0–0.85)
MONOCYTES # BLD AUTO: 0.47 K/UL (ref 0–0.85)
MONOCYTES NFR BLD AUTO: 4.6 % (ref 0–13.4)
MONOCYTES NFR BLD AUTO: 7.3 % (ref 0–13.4)
MORPHOLOGY BLD-IMP: NORMAL
MORPHOLOGY BLD-IMP: NORMAL
MYELOCYTES NFR BLD MANUAL: 1.8 %
NEUTROPHILS # BLD AUTO: 2.44 K/UL (ref 2–7.15)
NEUTROPHILS # BLD AUTO: 4.58 K/UL (ref 2–7.15)
NEUTROPHILS NFR BLD: 37.6 % (ref 44–72)
NEUTROPHILS NFR BLD: 60.5 % (ref 44–72)
NEUTS BAND NFR BLD MANUAL: 0.5 % (ref 0–10)
NRBC # BLD AUTO: 0.76 K/UL
NRBC # BLD AUTO: 1.01 K/UL
NRBC BLD-RTO: 11.7 /100 WBC
NRBC BLD-RTO: 13.5 /100 WBC
OVALOCYTES BLD QL SMEAR: NORMAL
PLATELET # BLD AUTO: 18 K/UL (ref 164–446)
PLATELET # BLD AUTO: 20 K/UL (ref 164–446)
PLATELET BLD QL SMEAR: NORMAL
PLATELET BLD QL SMEAR: NORMAL
PLATELETS.RETICULATED NFR BLD AUTO: 25.3 K/UL (ref 0.6–13.1)
PLATELETS.RETICULATED NFR BLD AUTO: 28.1 K/UL (ref 0.6–13.1)
POIKILOCYTOSIS BLD QL SMEAR: NORMAL
POIKILOCYTOSIS BLD QL SMEAR: NORMAL
POLYCHROMASIA BLD QL SMEAR: NORMAL
POLYCHROMASIA BLD QL SMEAR: NORMAL
POTASSIUM SERPL-SCNC: 3.1 MMOL/L (ref 3.6–5.5)
PROT SERPL-MCNC: 4.1 G/DL (ref 6–8.2)
PROTHROMBIN TIME: 19.1 SEC (ref 12–14.6)
RBC # BLD AUTO: 2.79 M/UL (ref 4.2–5.4)
RBC # BLD AUTO: 2.84 M/UL (ref 4.2–5.4)
RBC BLD AUTO: PRESENT
RBC BLD AUTO: PRESENT
SIGNIFICANT IND 70042: NORMAL
SITE SITE: NORMAL
SMUDGE CELLS BLD QL SMEAR: NORMAL
SODIUM SERPL-SCNC: 142 MMOL/L (ref 135–145)
SOURCE SOURCE: NORMAL
TARGETS BLD QL SMEAR: NORMAL
TARGETS BLD QL SMEAR: NORMAL
WBC # BLD AUTO: 6.5 K/UL (ref 4.8–10.8)
WBC # BLD AUTO: 7.5 K/UL (ref 4.8–10.8)

## 2021-07-14 PROCEDURE — 770020 HCHG ROOM/CARE - TELE (206)

## 2021-07-14 PROCEDURE — 85055 RETICULATED PLATELET ASSAY: CPT

## 2021-07-14 PROCEDURE — 83605 ASSAY OF LACTIC ACID: CPT

## 2021-07-14 PROCEDURE — A9270 NON-COVERED ITEM OR SERVICE: HCPCS | Performed by: INTERNAL MEDICINE

## 2021-07-14 PROCEDURE — 700102 HCHG RX REV CODE 250 W/ 637 OVERRIDE(OP): Performed by: INTERNAL MEDICINE

## 2021-07-14 PROCEDURE — 36430 TRANSFUSION BLD/BLD COMPNT: CPT

## 2021-07-14 PROCEDURE — 99233 SBSQ HOSP IP/OBS HIGH 50: CPT | Mod: GC | Performed by: HOSPITALIST

## 2021-07-14 PROCEDURE — 85007 BL SMEAR W/DIFF WBC COUNT: CPT

## 2021-07-14 PROCEDURE — A9270 NON-COVERED ITEM OR SERVICE: HCPCS | Performed by: FAMILY MEDICINE

## 2021-07-14 PROCEDURE — A9270 NON-COVERED ITEM OR SERVICE: HCPCS | Performed by: STUDENT IN AN ORGANIZED HEALTH CARE EDUCATION/TRAINING PROGRAM

## 2021-07-14 PROCEDURE — 82105 ALPHA-FETOPROTEIN SERUM: CPT

## 2021-07-14 PROCEDURE — 86023 IMMUNOGLOBULIN ASSAY: CPT

## 2021-07-14 PROCEDURE — 80053 COMPREHEN METABOLIC PANEL: CPT

## 2021-07-14 PROCEDURE — 700111 HCHG RX REV CODE 636 W/ 250 OVERRIDE (IP): Performed by: INTERNAL MEDICINE

## 2021-07-14 PROCEDURE — P9034 PLATELETS, PHERESIS: HCPCS

## 2021-07-14 PROCEDURE — 85384 FIBRINOGEN ACTIVITY: CPT

## 2021-07-14 PROCEDURE — 700102 HCHG RX REV CODE 250 W/ 637 OVERRIDE(OP): Performed by: FAMILY MEDICINE

## 2021-07-14 PROCEDURE — 700105 HCHG RX REV CODE 258: Performed by: INTERNAL MEDICINE

## 2021-07-14 PROCEDURE — 76705 ECHO EXAM OF ABDOMEN: CPT

## 2021-07-14 PROCEDURE — 700102 HCHG RX REV CODE 250 W/ 637 OVERRIDE(OP): Performed by: STUDENT IN AN ORGANIZED HEALTH CARE EDUCATION/TRAINING PROGRAM

## 2021-07-14 PROCEDURE — 85610 PROTHROMBIN TIME: CPT

## 2021-07-14 PROCEDURE — 85027 COMPLETE CBC AUTOMATED: CPT

## 2021-07-14 RX ORDER — POTASSIUM CHLORIDE 20 MEQ/1
40 TABLET, EXTENDED RELEASE ORAL ONCE
Status: COMPLETED | OUTPATIENT
Start: 2021-07-14 | End: 2021-07-14

## 2021-07-14 RX ORDER — FUROSEMIDE 10 MG/ML
20 INJECTION INTRAMUSCULAR; INTRAVENOUS
Status: DISCONTINUED | OUTPATIENT
Start: 2021-07-15 | End: 2021-07-18

## 2021-07-14 RX ADMIN — LABETALOL HYDROCHLORIDE 10 MG: 5 INJECTION, SOLUTION INTRAVENOUS at 01:15

## 2021-07-14 RX ADMIN — POTASSIUM CHLORIDE 40 MEQ: 1500 TABLET, EXTENDED RELEASE ORAL at 12:16

## 2021-07-14 RX ADMIN — NYSTATIN 500000 UNITS: 100000 SUSPENSION ORAL at 22:25

## 2021-07-14 RX ADMIN — NYSTATIN 500000 UNITS: 100000 SUSPENSION ORAL at 12:16

## 2021-07-14 RX ADMIN — FUROSEMIDE 20 MG: 10 INJECTION, SOLUTION INTRAVENOUS at 05:57

## 2021-07-14 RX ADMIN — FLUDROCORTISONE ACETATE 0.1 MG: 0.1 TABLET ORAL at 05:58

## 2021-07-14 RX ADMIN — CALCITONIN SALMON 200 UNITS: 200 SPRAY, METERED NASAL at 22:25

## 2021-07-14 RX ADMIN — FLUDROCORTISONE ACETATE 0.1 MG: 0.1 TABLET ORAL at 16:48

## 2021-07-14 RX ADMIN — COLESEVELAM HYDROCHLORIDE 625 MG: 625 TABLET, COATED ORAL at 22:25

## 2021-07-14 RX ADMIN — Medication: at 16:48

## 2021-07-14 RX ADMIN — HYDROCORTISONE 20 MG: 20 TABLET ORAL at 05:58

## 2021-07-14 RX ADMIN — GABAPENTIN 400 MG: 400 CAPSULE ORAL at 16:48

## 2021-07-14 RX ADMIN — NYSTATIN: 100000 POWDER TOPICAL at 11:38

## 2021-07-14 RX ADMIN — DULOXETINE HYDROCHLORIDE 60 MG: 60 CAPSULE, DELAYED RELEASE ORAL at 22:25

## 2021-07-14 RX ADMIN — MUPIROCIN 1 APPLICATION: 20 OINTMENT TOPICAL at 05:59

## 2021-07-14 RX ADMIN — FENTANYL CITRATE 25 MCG: 50 INJECTION INTRAMUSCULAR; INTRAVENOUS at 08:42

## 2021-07-14 RX ADMIN — NYSTATIN: 100000 POWDER TOPICAL at 16:53

## 2021-07-14 RX ADMIN — NYSTATIN: 100000 POWDER TOPICAL at 05:59

## 2021-07-14 RX ADMIN — Medication 2000 UNITS: at 05:57

## 2021-07-14 RX ADMIN — FENTANYL CITRATE 25 MCG: 50 INJECTION INTRAMUSCULAR; INTRAVENOUS at 02:47

## 2021-07-14 RX ADMIN — HYDROCORTISONE 10 MG: 20 TABLET ORAL at 16:48

## 2021-07-14 RX ADMIN — COLESEVELAM HYDROCHLORIDE 625 MG: 625 TABLET, COATED ORAL at 15:06

## 2021-07-14 RX ADMIN — COLESEVELAM HYDROCHLORIDE 625 MG: 625 TABLET, COATED ORAL at 08:17

## 2021-07-14 RX ADMIN — MUPIROCIN 1 APPLICATION: 20 OINTMENT TOPICAL at 16:47

## 2021-07-14 RX ADMIN — LEVOTHYROXINE SODIUM 75 MCG: 0.07 TABLET ORAL at 05:58

## 2021-07-14 RX ADMIN — Medication: at 05:59

## 2021-07-14 RX ADMIN — PHYTONADIONE 10 MG: 10 INJECTION, EMULSION INTRAMUSCULAR; INTRAVENOUS; SUBCUTANEOUS at 17:17

## 2021-07-14 RX ADMIN — AMITRIPTYLINE HYDROCHLORIDE 10 MG: 10 TABLET, FILM COATED ORAL at 08:18

## 2021-07-14 RX ADMIN — NYSTATIN 500000 UNITS: 100000 SUSPENSION ORAL at 16:47

## 2021-07-14 RX ADMIN — LIOTHYRONINE SODIUM 25 MCG: 25 TABLET ORAL at 22:25

## 2021-07-14 RX ADMIN — GABAPENTIN 400 MG: 400 CAPSULE ORAL at 05:57

## 2021-07-14 RX ADMIN — NYSTATIN 500000 UNITS: 100000 SUSPENSION ORAL at 08:18

## 2021-07-14 ASSESSMENT — PAIN DESCRIPTION - PAIN TYPE
TYPE: ACUTE PAIN;CHRONIC PAIN
TYPE: ACUTE PAIN;CHRONIC PAIN
TYPE: ACUTE PAIN
TYPE: ACUTE PAIN;CHRONIC PAIN
TYPE: ACUTE PAIN;CHRONIC PAIN
TYPE: ACUTE PAIN
TYPE: ACUTE PAIN;CHRONIC PAIN
TYPE: ACUTE PAIN

## 2021-07-14 ASSESSMENT — ENCOUNTER SYMPTOMS
NAUSEA: 0
FALLS: 1
HEARTBURN: 0
VOMITING: 0
BLOOD IN STOOL: 1
CHILLS: 0
SHORTNESS OF BREATH: 0
DIARRHEA: 0
BLURRED VISION: 0
DIZZINESS: 1
ABDOMINAL PAIN: 1
FEVER: 0
CONSTIPATION: 0
DOUBLE VISION: 0
COUGH: 0
SORE THROAT: 1
PALPITATIONS: 0
HEMOPTYSIS: 0

## 2021-07-14 ASSESSMENT — FIBROSIS 4 INDEX
FIB4 SCORE: 64.26
FIB4 SCORE: 64.26

## 2021-07-14 NOTE — PROGRESS NOTES
Gastroenterology Progress Note     Author: Charlie Read M.D.   Date & Time Created: 7/14/2021 12:54 PM    Chief Complaint:  Abdominal pain, hematochezia    Interval History:  Patient reports weakness today. Denies changes in her abdominal pain.     Review of Systems:  Review of Systems   Constitutional: Negative for chills and fever.   Respiratory: Negative for shortness of breath.    Cardiovascular: Negative for chest pain.   Gastrointestinal: Positive for abdominal pain. Negative for constipation, diarrhea, heartburn, nausea and vomiting.       Physical Exam:  Physical Exam  Constitutional:       Appearance: She is obese. She is ill-appearing.   Cardiovascular:      Rate and Rhythm: Normal rate and regular rhythm.   Pulmonary:      Effort: Pulmonary effort is normal. No respiratory distress.      Breath sounds: Normal breath sounds.   Abdominal:      General: Abdomen is flat. Bowel sounds are normal. There is no distension.      Palpations: Abdomen is soft.      Tenderness: There is abdominal tenderness.   Neurological:      Mental Status: She is alert.         Labs:          Recent Labs     07/12/21  0523 07/13/21  0557 07/14/21  0410   SODIUM 139 140 142   POTASSIUM 4.8 4.2 3.1*   CHLORIDE 100 100 98   CO2 25 27 34*   BUN 14 18 17   CREATININE 0.81 0.60 0.34*   CALCIUM 8.9 8.5 8.2*     Recent Labs     07/12/21  0523 07/12/21  0523 07/12/21  0755 07/13/21  0557 07/14/21  0410   ALTSGPT 42  --   --  54* 71*   ASTSGOT 55*  --   --  114* 171*   ALKPHOSPHAT 236*  --   --  283* 366*   TBILIRUBIN 9.5*   < > 9.9* 12.3* 17.8*   DBILIRUBIN  --   --  7.2*  --   --    GLUCOSE 125*  --   --  126* 135*    < > = values in this interval not displayed.     Recent Labs     07/11/21  1546 07/11/21  2120 07/12/21  0523 07/12/21  1205 07/13/21  0557 07/13/21  1010 07/13/21  1700 07/13/21  1820 07/13/21  2050 07/14/21  0410 07/14/21  0825   RBC  --   --  2.90*   < > 2.72*   < >   < >  --  2.76* 2.79* 2.84*   HEMOGLOBIN  --     < > 8.7*   < > 8.2*   < >   < >  --  8.3* 8.3* 8.5*   HEMATOCRIT  --    < > 25.8*   < > 24.9*   < >   < >  --  25.1* 25.0* 25.5*   PLATELETCT  --   --  65*   < > 38*   < >   < >  --  21* 20* 18*   PROTHROMBTM 20.7*   < > 19.2*  --  18.9*  --   --   --   --  19.1*  --    APTT 37.5*  --   --   --   --   --   --   --   --   --   --    INR 1.84*   < > 1.67*  --  1.64*  --   --   --   --  1.66*  --    IRON  --   --   --   --   --   --   --  106  --   --   --    FERRITIN  --   --   --   --   --   --   --  383.0*  --   --   --    TOTIRONBC  --   --   --   --   --   --   --  123*  --   --   --     < > = values in this interval not displayed.     Recent Labs     21  0523 21  0523 21  0755 21  1205 21  0557 21  1010 21  2050 21  0410 21  0825   WBC 3.9*  --   --    < > 3.6*   < > 4.6* 6.5 7.5   NEUTSPOLYS 71.60  --   --    < > 59.80   < > 61.70 37.60* 60.50   LYMPHOCYTES 16.20*  --   --    < > 26.30   < > 30.00 55.10* 31.20   MONOCYTES 11.10  --   --    < > 11.40   < > 7.40 7.30 4.60   EOSINOPHILS 0.00  --   --    < > 0.00   < > 0.00 0.00 0.50   BASOPHILS 0.30  --   --    < > 0.30   < > 0.00 0.00 0.00   ASTSGOT 55*  --   --   --  114*  --   --  171*  --    ALTSGPT 42  --   --   --  54*  --   --  71*  --    ALKPHOSPHAT 236*  --   --   --  283*  --   --  366*  --    TBILIRUBIN 9.5*   < > 9.9*  --  12.3*  --   --  17.8*  --     < > = values in this interval not displayed.     Hemodynamics:  Temp (24hrs), Av.3 °C (97.4 °F), Min:36.2 °C (97.1 °F), Max:36.6 °C (97.8 °F)  Temperature: 36.2 °C (97.2 °F)  Pulse  Av  Min: 60  Max: 106   Blood Pressure: 151/94     Respiratory:    Respiration: 20, Pulse Oximetry: 95 %        RUL Breath Sounds: Clear, RML Breath Sounds: Diminished, RLL Breath Sounds: Diminished, TRISTAN Breath Sounds: Clear, LLL Breath Sounds: Diminished  Fluids:    Intake/Output Summary (Last 24 hours) at 2021 1254  Last data filed at 2021 0900  Gross  per 24 hour   Intake --   Output 3100 ml   Net -3100 ml     Weight: 104 kg (229 lb 4.5 oz)  GI/Nutrition:  Orders Placed This Encounter   Procedures   • Diet Order Diet: Cardiac     Standing Status:   Standing     Number of Occurrences:   1     Order Specific Question:   Diet:     Answer:   Cardiac [6]     Medical Decision Making, by Problem:  Active Hospital Problems    Diagnosis    • *Coagulopathy (HCC) [D68.9]    • Hepatic steatosis [K76.0]    • DIC (disseminated intravascular coagulation) (HCC) [D65]    • Ischemic colitis (HCC) [K55.9]    • Lower GI bleed [K92.2]    • Epistaxis [R04.0]    • Electrolyte imbalance [E87.8]    • Thrush [B37.0]    • Incontinence [R32]    • Thrombocytopenia (HCC) [D69.6]    • Hemochromatosis [E83.119]    • Recurrent syncope [R55]    • History of pulmonary embolus (PE) [Z86.711]    • Adrenal insufficiency ([place]ree's disease) (HCC) [E27.1]    • Lactic acidemia [E87.2]    • Chronic sinusitis [J32.9]        Plan:  Elevated LFTs, hematochezia - question of cirrhosis. LFTS and platelets have fluctuated from normal to abnormal over the last few months. US shows normal directional flow of blood through the portal vein. Elastography is not consistent with cirrhosis. Additional liver labs are pending. Currently workup would not be consistent with chronic liver disease as the cause of her current clinical picture. A definitive answer would require a liver biopsy. She may have a component of ETOH hepatitis given the amount of ETOH she was drinking prior to admission. Continue to monitor liver and f/u additional lab tests. Continue supportive care    Quality-Core Measures

## 2021-07-14 NOTE — DISCHARGE PLANNING
TCC/ Rehab follow up.   Donna was sent acute from [facility].  Would apprecite a PMR consult referral and TX evals once appropriate.      Will follow up with Physiatry for a potential return once medically cleared.

## 2021-07-14 NOTE — CARE PLAN
The patient is Watcher - Medium risk of patient condition declining or worsening    Shift Goals  Clinical Goals: derease synopial event, labs w/nl    Progress made toward(s) clinical / shift goals:    Problem: Knowledge Deficit - Standard  Goal: Patient and family/care givers will demonstrate understanding of plan of care, disease process/condition, diagnostic tests and medications  Outcome: Progressing     Problem: Fall Risk  Goal: Patient will remain free from falls  Outcome: Progressing     Problem: Skin Integrity  Goal: Skin integrity is maintained or improved  Outcome: Progressing

## 2021-07-14 NOTE — PROGRESS NOTES
Spoke with [facility] blue team about plt transfusion order. Orders received to wait for the results of Platelet associated ABS-direct and to call [facility] blue team with the results before transfusing platelets. Plan to removed L IJ triple lumen after platelet transfusion to reduce bleeding risks.

## 2021-07-14 NOTE — CARE PLAN
Problem: Pain - Standard  Goal: Alleviation of pain or a reduction in pain to the patient’s comfort goal  Outcome: Met  Note: PRN medications take away pain per patient    The patient is Watcher - Medium risk of patient condition declining or worsening    Shift Goals  Clinical Goals: derease synopial event, labs w/nl    Progress made toward(s) clinical / shift goals:  one syncopal episode today that lasted about 20 seconds    Patient is not progressing towards the following goals:

## 2021-07-14 NOTE — PROGRESS NOTES
Infectious Disease Progress Note    Author: Mitch Marcus M.D. Date & Time created: 7/14/2021  11:30 AM     Antibiotics - None     Previous:  7/9-7/11: Meropenem  7/9-7/10: Vanco     7/12: No specific new complaints some abdominal tenderness unchanged from 7/11 without fever or leukocytosis.  7/13: No acute issues overnight. No fevers.  7/14: She is doing OK off of antibiotics today and the GI service is evaluating her liver.       Interval History:  Past 24 hrs reviewed with RN    Labs Reviewed, Medications Reviewed, Radiology Reviewed, Wound Reviewed, Fluids Reviewed and GI Nutrition Reviewed    Review of Systems:  Review of Systems   Constitutional: Negative for chills and fever.   Respiratory: Negative for cough and shortness of breath.    Cardiovascular: Negative for chest pain.   Gastrointestinal: Positive for abdominal pain (upper half RUQ > LUQ). Negative for constipation, diarrhea, nausea and vomiting.   Genitourinary: Negative for dysuria.       Physical Exam:  Physical Exam  Vitals and nursing note reviewed.   Constitutional:       General: She is not in acute distress.     Appearance: She is obese. She is not ill-appearing, toxic-appearing or diaphoretic.   HENT:      Head: Normocephalic and atraumatic.   Cardiovascular:      Rate and Rhythm: Normal rate and regular rhythm.      Heart sounds: No murmur heard.     Pulmonary:      Effort: Pulmonary effort is normal. No respiratory distress.      Breath sounds: No wheezing or rhonchi.   Abdominal:      General: Abdomen is flat. There is no distension.      Palpations: Abdomen is soft.      Tenderness: There is abdominal tenderness (RUQ > LUQ and some in deep LLQ). There is no guarding or rebound.   Skin:     General: Skin is warm and dry.   Neurological:      Mental Status: She is alert and oriented to person, place, and time.   Psychiatric:         Mood and Affect: Mood normal.         Behavior: Behavior normal.         Labs:  Recent Results (from the  past 24 hour(s))   CBC WITH DIFFERENTIAL    Collection Time: 07/13/21  5:00 PM   Result Value Ref Range    WBC 4.8 4.8 - 10.8 K/uL    RBC 2.80 (L) 4.20 - 5.40 M/uL    Hemoglobin 8.4 (L) 12.0 - 16.0 g/dL    Hematocrit 25.3 (L) 37.0 - 47.0 %    MCV 90.4 81.4 - 97.8 fL    MCH 30.0 27.0 - 33.0 pg    MCHC 33.2 (L) 33.6 - 35.0 g/dL    RDW 60.4 (H) 35.9 - 50.0 fL    Platelet Count 26 (LL) 164 - 446 K/uL    Neutrophils-Polys 54.10 44.00 - 72.00 %    Lymphocytes 39.50 22.00 - 41.00 %    Monocytes 5.50 0.00 - 13.40 %    Eosinophils 0.00 0.00 - 6.90 %    Basophils 0.00 0.00 - 1.80 %    Nucleated RBC 7.50 /100 WBC    Neutrophils (Absolute) 2.60 2.00 - 7.15 K/uL    Lymphs (Absolute) 1.90 1.00 - 4.80 K/uL    Monos (Absolute) 0.26 0.00 - 0.85 K/uL    Eos (Absolute) 0.00 0.00 - 0.51 K/uL    Baso (Absolute) 0.00 0.00 - 0.12 K/uL    NRBC (Absolute) 0.36 K/uL    Hypochromia 2+ (A)     Anisocytosis 1+     Macrocytosis 1+     Microcytosis 1+    MORPHOLOGY    Collection Time: 07/13/21  5:00 PM   Result Value Ref Range    RBC Morphology Present     Polychromia 1+     Poikilocytosis 1+     Schistocytes 1+     Target Cells 1+    PERIPHERAL SMEAR REVIEW    Collection Time: 07/13/21  5:00 PM   Result Value Ref Range    Peripheral Smear Review see below    IMMATURE PLT FRACTION    Collection Time: 07/13/21  5:00 PM   Result Value Ref Range    Imm. Plt Fraction 16.6 (H) 0.6 - 13.1 K/uL   DIFFERENTIAL MANUAL    Collection Time: 07/13/21  5:00 PM   Result Value Ref Range    Myelocytes 0.90 %    Manual Diff Status PERFORMED    PLATELET ESTIMATE    Collection Time: 07/13/21  5:00 PM   Result Value Ref Range    Plt Estimation Marked Decrease    FERRITIN    Collection Time: 07/13/21  6:20 PM   Result Value Ref Range    Ferritin 383.0 (H) 10.0 - 291.0 ng/mL   IRON/TOTAL IRON BIND    Collection Time: 07/13/21  6:20 PM   Result Value Ref Range    Iron 106 40 - 170 ug/dL    Total Iron Binding 123 (L) 250 - 450 ug/dL    Unsat Iron Binding <17 (L) 110 -  370 ug/dL    % Saturation 86 (H) 15 - 55 %   CBC WITH DIFFERENTIAL    Collection Time: 07/13/21  8:50 PM   Result Value Ref Range    WBC 4.6 (L) 4.8 - 10.8 K/uL    RBC 2.76 (L) 4.20 - 5.40 M/uL    Hemoglobin 8.3 (L) 12.0 - 16.0 g/dL    Hematocrit 25.1 (L) 37.0 - 47.0 %    MCV 90.9 81.4 - 97.8 fL    MCH 30.1 27.0 - 33.0 pg    MCHC 33.1 (L) 33.6 - 35.0 g/dL    RDW 59.5 (H) 35.9 - 50.0 fL    Platelet Count 21 (LL) 164 - 446 K/uL    Neutrophils-Polys 61.70 44.00 - 72.00 %    Lymphocytes 30.00 22.00 - 41.00 %    Monocytes 7.40 0.00 - 13.40 %    Eosinophils 0.00 0.00 - 6.90 %    Basophils 0.00 0.00 - 1.80 %    Nucleated RBC 11.10 /100 WBC    Neutrophils (Absolute) 2.84 2.00 - 7.15 K/uL    Lymphs (Absolute) 1.38 1.00 - 4.80 K/uL    Monos (Absolute) 0.34 0.00 - 0.85 K/uL    Eos (Absolute) 0.00 0.00 - 0.51 K/uL    Baso (Absolute) 0.00 0.00 - 0.12 K/uL    NRBC (Absolute) 0.51 K/uL    Anisocytosis 2+ (A)     Macrocytosis 2+ (A)    DIFFERENTIAL MANUAL    Collection Time: 07/13/21  8:50 PM   Result Value Ref Range    Metamyelocytes 0.90 %    Manual Diff Status PERFORMED    PERIPHERAL SMEAR REVIEW    Collection Time: 07/13/21  8:50 PM   Result Value Ref Range    Peripheral Smear Review see below    PLATELET ESTIMATE    Collection Time: 07/13/21  8:50 PM   Result Value Ref Range    Plt Estimation Marked Decrease    MORPHOLOGY    Collection Time: 07/13/21  8:50 PM   Result Value Ref Range    RBC Morphology Present     Polychromia 1+     Poikilocytosis 2+     Target Cells 2+    IMMATURE PLT FRACTION    Collection Time: 07/13/21  8:50 PM   Result Value Ref Range    Imm. Plt Fraction 17.3 (H) 0.6 - 13.1 K/uL   CBC WITH DIFFERENTIAL    Collection Time: 07/14/21  4:10 AM   Result Value Ref Range    WBC 6.5 4.8 - 10.8 K/uL    RBC 2.79 (L) 4.20 - 5.40 M/uL    Hemoglobin 8.3 (L) 12.0 - 16.0 g/dL    Hematocrit 25.0 (L) 37.0 - 47.0 %    MCV 89.6 81.4 - 97.8 fL    MCH 29.7 27.0 - 33.0 pg    MCHC 33.2 (L) 33.6 - 35.0 g/dL    RDW 59.1 (H) 35.9  - 50.0 fL    Platelet Count 20 (LL) 164 - 446 K/uL    Neutrophils-Polys 37.60 (L) 44.00 - 72.00 %    Lymphocytes 55.10 (H) 22.00 - 41.00 %    Monocytes 7.30 0.00 - 13.40 %    Eosinophils 0.00 0.00 - 6.90 %    Basophils 0.00 0.00 - 1.80 %    Nucleated RBC 11.70 /100 WBC    Neutrophils (Absolute) 2.44 2.00 - 7.15 K/uL    Lymphs (Absolute) 3.58 1.00 - 4.80 K/uL    Monos (Absolute) 0.47 0.00 - 0.85 K/uL    Eos (Absolute) 0.00 0.00 - 0.51 K/uL    Baso (Absolute) 0.00 0.00 - 0.12 K/uL    NRBC (Absolute) 0.76 K/uL    Anisocytosis 2+ (A)     Macrocytosis 1+     Microcytosis 1+    Comp Metabolic Panel    Collection Time: 07/14/21  4:10 AM   Result Value Ref Range    Sodium 142 135 - 145 mmol/L    Potassium 3.1 (L) 3.6 - 5.5 mmol/L    Chloride 98 96 - 112 mmol/L    Co2 34 (H) 20 - 33 mmol/L    Anion Gap 10.0 7.0 - 16.0    Glucose 135 (H) 65 - 99 mg/dL    Bun 17 8 - 22 mg/dL    Creatinine 0.34 (L) 0.50 - 1.40 mg/dL    Calcium 8.2 (L) 8.5 - 10.5 mg/dL    AST(SGOT) 171 (H) 12 - 45 U/L    ALT(SGPT) 71 (H) 2 - 50 U/L    Alkaline Phosphatase 366 (H) 30 - 99 U/L    Total Bilirubin 17.8 (H) 0.1 - 1.5 mg/dL    Albumin 3.1 (L) 3.2 - 4.9 g/dL    Total Protein 4.1 (L) 6.0 - 8.2 g/dL    Globulin 1.0 (L) 1.9 - 3.5 g/dL    A-G Ratio 3.1 g/dL   Prothrombin Time    Collection Time: 07/14/21  4:10 AM   Result Value Ref Range    PT 19.1 (H) 12.0 - 14.6 sec    INR 1.66 (H) 0.87 - 1.13   FIBRINOGEN    Collection Time: 07/14/21  4:10 AM   Result Value Ref Range    Fibrinogen 134 (L) 215 - 460 mg/dL   LACTIC ACID    Collection Time: 07/14/21  4:10 AM   Result Value Ref Range    Lactic Acid 3.3 (H) 0.5 - 2.0 mmol/L   ESTIMATED GFR    Collection Time: 07/14/21  4:10 AM   Result Value Ref Range    GFR If African American >60 >60 mL/min/1.73 m 2    GFR If Non African American >60 >60 mL/min/1.73 m 2   DIFFERENTIAL MANUAL    Collection Time: 07/14/21  4:10 AM   Result Value Ref Range    Manual Diff Status PERFORMED    PERIPHERAL SMEAR REVIEW     Collection Time: 07/14/21  4:10 AM   Result Value Ref Range    Peripheral Smear Review see below    PLATELET ESTIMATE    Collection Time: 07/14/21  4:10 AM   Result Value Ref Range    Plt Estimation Marked Decrease    MORPHOLOGY    Collection Time: 07/14/21  4:10 AM   Result Value Ref Range    RBC Morphology Present     Polychromia 1+     Poikilocytosis 2+     Target Cells 2+     Smudge Cells Few    IMMATURE PLT FRACTION    Collection Time: 07/14/21  4:10 AM   Result Value Ref Range    Imm. Plt Fraction 25.3 (H) 0.6 - 13.1 K/uL   CBC WITH DIFFERENTIAL    Collection Time: 07/14/21  8:25 AM   Result Value Ref Range    WBC 7.5 4.8 - 10.8 K/uL    RBC 2.84 (L) 4.20 - 5.40 M/uL    Hemoglobin 8.5 (L) 12.0 - 16.0 g/dL    Hematocrit 25.5 (L) 37.0 - 47.0 %    MCV 89.8 81.4 - 97.8 fL    MCH 29.9 27.0 - 33.0 pg    MCHC 33.3 (L) 33.6 - 35.0 g/dL    RDW 59.7 (H) 35.9 - 50.0 fL    Platelet Count 18 (LL) 164 - 446 K/uL    Neutrophils-Polys 60.50 44.00 - 72.00 %    Lymphocytes 31.20 22.00 - 41.00 %    Monocytes 4.60 0.00 - 13.40 %    Eosinophils 0.50 0.00 - 6.90 %    Basophils 0.00 0.00 - 1.80 %    Nucleated RBC 13.50 /100 WBC    Neutrophils (Absolute) 4.58 2.00 - 7.15 K/uL    Lymphs (Absolute) 2.34 1.00 - 4.80 K/uL    Monos (Absolute) 0.35 0.00 - 0.85 K/uL    Eos (Absolute) 0.04 0.00 - 0.51 K/uL    Baso (Absolute) 0.00 0.00 - 0.12 K/uL    NRBC (Absolute) 1.01 K/uL    Hypochromia 1+     Anisocytosis 1+     Macrocytosis 1+    DIFFERENTIAL MANUAL    Collection Time: 07/14/21  8:25 AM   Result Value Ref Range    Bands-Stabs 0.50 0.00 - 10.00 %    Metamyelocytes 0.90 %    Myelocytes 1.80 %    Manual Diff Status PERFORMED    PERIPHERAL SMEAR REVIEW    Collection Time: 07/14/21  8:25 AM   Result Value Ref Range    Peripheral Smear Review see below    PLATELET ESTIMATE    Collection Time: 07/14/21  8:25 AM   Result Value Ref Range    Plt Estimation Marked Decrease    MORPHOLOGY    Collection Time: 07/14/21  8:25 AM   Result Value Ref Range  "   RBC Morphology Present     Giant Platelets 1+     Polychromia 1+     Poikilocytosis 2+     Ovalocytes 1+     Target Cells 2+     Tear Drop Cells 1+    IMMATURE PLT FRACTION    Collection Time: 07/14/21  8:25 AM   Result Value Ref Range    Imm. Plt Fraction 28.1 (H) 0.6 - 13.1 K/uL     Results     Procedure Component Value Units Date/Time    BLOOD CULTURE [[id number]] Collected: 07/09/21 1819    Order Status: Completed Specimen: Blood from Peripheral Updated: 07/10/21 0854     Significant Indicator NEG     Source BLD     Site PERIPHERAL     Culture Result No Growth  Note: Blood cultures are incubated for 5 days and  are monitored continuously.Positive blood cultures  are called to the RN and reported as soon as  they are identified.      Narrative:      Per Hospital Policy: Only change Specimen Src: to \"Line\" if  specified by physician order.  Left Wrist    BLOOD CULTURE [[id number]] Collected: 07/09/21 2229    Order Status: Completed Specimen: Blood from Peripheral Updated: 07/10/21 0854     Significant Indicator NEG     Source BLD     Site PERIPHERAL     Culture Result No Growth  Note: Blood cultures are incubated for 5 days and  are monitored continuously.Positive blood cultures  are called to the RN and reported as soon as  they are identified.      Narrative:      Per Hospital Policy: Only change Specimen Src: to \"Line\" if  specified by physician order.  Right Forearm/Arm    URINALYSIS (UA) [[id number]]  (Abnormal) Collected: 07/10/21 0145    Order Status: Completed Specimen: Urine Updated: 07/10/21 0304     Color DK Yellow     Character Cloudy     Specific Gravity 1.015     Ph 5.0     Glucose Negative mg/dL      Ketones Negative mg/dL      Protein Negative mg/dL      Bilirubin Large     Urobilinogen, Urine 0.2     Nitrite Positive     Leukocyte Esterase Trace     Occult Blood Negative     Micro Urine Req Microscopic    Blood Culture [[id number]] Collected: 07/10/21 0000    Order Status: Canceled Specimen: Other " from Peripheral     MRSA By PCR (Amp) [[id number]]  (Abnormal) Collected: 21 1833    Order Status: Completed Specimen: Respirate from Nares Updated: 21     Significant Indicator POS     Source RESP     Site NARES     MRSA PCR POSITIVE for MRSA by PCR.    Narrative:      CALL  [facility]  [address] tel. [phone],  CALLED  183 tel. [phone] 2021, 22:31, RB PERF. RESULTS CALLED  TO:[phone], faxed [phone].  Collected By:[id number] JUSTO SUAREZ  Collected By:[id number] JUSTO SUAREZ    URINALYSIS [[id number]] Collected: 21 0000    Order Status: Canceled Specimen: Urine, Clean Catch         Hemodynamics:  Temp (24hrs), Av.4 °C (97.5 °F), Min:36.2 °C (97.1 °F), Max:36.6 °C (97.8 °F)  Temperature: 36.2 °C (97.1 °F)  Pulse  Av.9  Min: 60  Max: 106   Blood Pressure: 149/98     PICC Single Lumen Left Basilic (Active)   Site Assessment Clean;Dry;Intact 21 08   Line Status Scrubbed the hub prior to access;Blood return noted;Flushed;Saline locked 21 08   Dressing Type Biopatch;Transparent 21 08   Dressing Status Clean;Dry;Intact 21 08   Dressing Intervention N/A 21   Dressing Change Due 21 0800   Date Primary Tubing Changed 21   Date Secondary Tubing Changed 21   NEXT Primary Tubing Change  21   NEXT Secondary Tubing Change  21   NEXT IV Connector(s) Change 21   Line Necessity Assessed Lack of Peripheral Access 21 0800   $ Single Lumen PICC Charge Single kit used 21 2100       CVC Triple Lumen 21 Non-tunneled Left Internal jugular (Active)   Site Assessment Clean;Dry;Intact 21 08   Lumen 1 Status Scrubbed the hub prior to access;Blood return noted;Flushed;Normal saline locked 21 0800   Lumen 3 Status Scrubbed the hub prior to access;Blood return noted;Flushed;Normal saline locked 21 08   Lumen 2 Status  Scrubbed the hub prior to access;Blood return noted;Flushed;Normal saline locked 07/14/21 0800   Dressing Type Biopatch;Transparent 07/14/21 0800   Dressing Status Intact;Old drainage 07/14/21 0800   Dressing Intervention Dressing changed per protocol 07/11/21 2300   Dressing Change Due 07/17/21 07/13/21 0800   NEXT Primary Tubing Change  07/13/21 07/11/21 2300   NEXT Secondary Tubing Change  07/13/21 07/11/21 2300   NEXT IV Connector(s) Change 07/17/21 07/11/21 2300   Waveform Not Applicable 07/13/21 0800   Line Calibrated Not Applicable 07/13/21 0800   Line Necessity Assessed Lack of Peripheral Access 07/13/21 2200     Wound:  @WOUNDLDA(4)@     Fluids:  Intake/Output                             07/12/21 0700 - 07/13/21 0659 07/13/21 0700 - 07/14/21 0659 07/14/21 0700 - 07/15/21 0659     0700-1859 9227-1006 Total 0700-1859 7706-0234 Total 0700-1859 3664-4348 Total                    Intake    Blood  --  0 0  --  -- --  --  -- --    Volume (RELEASE RED BLOOD CELLS) -- 0 0 -- -- -- -- -- --    Total Intake -- 0 0 -- -- -- -- -- --       Output    Urine  1150  1250 2400  950  2000 2950  850  -- 850    Number of Times Voided 2 x -- 2 x 3 x -- 3 x -- -- --    Urine Void (mL) 1150 1250 2400 950 2000 2950 850 -- 850    Stool  --  -- --  --  -- --  --  -- --    Number of Times Stooled -- -- -- 0 x 2 x 2 x -- -- --    Total Output 1150 1250 2400 950 2000 2950 850 -- 850       Net I/O     -1150 -1250 -2400 -950 -2000 -2950 -850 -- -850           GI/Nutrition:  Orders Placed This Encounter   Procedures   • Diet Order Diet: Cardiac     Standing Status:   Standing     Number of Occurrences:   1     Order Specific Question:   Diet:     Answer:   Cardiac [6]     Medications:  Current Facility-Administered Medications   Medication Last Admin   • labetalol (NORMODYNE/TRANDATE) injection 10 mg 10 mg at 07/14/21 0115   • fludrocortisone (FLORINEF) tablet 0.1 mg 0.1 mg at 07/14/21 0558   • fentaNYL (SUBLIMAZE) injection 25-50 mcg 25  mcg at 07/14/21 0842   • furosemide (LASIX) injection 20 mg 20 mg at 07/14/21 0557   • mupirocin (BACTROBAN) 2 % ointment 1 Application at 07/14/21 0559   • phytonadione (AQUA-MEPHYTON) 10 mg in NS 50 mL IVPB Stopped at 07/13/21 1814   • hydrocortisone (CORTEF) tablet 20 mg 20 mg at 07/14/21 0558   • hydrocortisone (CORTEF) tablet 10 mg 10 mg at 07/13/21 1705   • nystatin (MYCOSTATIN) powder Given at 07/14/21 0559   • bacitracin ointment Given at 07/14/21 0559   • ondansetron (ZOFRAN) syringe/vial injection 4 mg     • ondansetron (ZOFRAN ODT) dispertab 4 mg     • promethazine (PHENERGAN) tablet 12.5-25 mg     • promethazine (PHENERGAN) suppository 12.5-25 mg     • prochlorperazine (COMPAZINE) injection 5-10 mg     • nystatin (MYCOSTATIN) 294884 UNIT/ML suspension 500,000 Units 500,000 Units at 07/14/21 0818   • amitriptyline (ELAVIL) tablet 10 mg 10 mg at 07/14/21 0818   • calcitonin (salmon) (MIACALCIN) nasal spray 200 Units 200 Units at 07/13/21 2040   • vitamin D (cholecalciferol) tablet 2,000 Units 2,000 Units at 07/14/21 0557   • colesevelam (WELCHOL) tablet 625 mg 625 mg at 07/14/21 0817   • DULoxetine (CYMBALTA) capsule 60 mg 60 mg at 07/13/21 2040   • gabapentin (NEURONTIN) capsule 400 mg 400 mg at 07/14/21 0557   • levothyroxine (SYNTHROID) tablet 75 mcg 75 mcg at 07/14/21 0558   • liothyronine (CYTOMEL) tablet 25 mcg 25 mcg at 07/13/21 2040   • SUMAtriptan (IMITREX) tablet 50 mg 50 mg at 07/13/21 0819   • acetaminophen (Tylenol) tablet 650 mg 650 mg at 07/11/21 0610     Medical Decision Making, by Problem:  Active Hospital Problems    Diagnosis    • *Coagulopathy (HCC) [D68.9]    • Hepatic steatosis [K76.0]    • DIC (disseminated intravascular coagulation) (HCC) [D65]    • Ischemic colitis (HCC) [K55.9]    • Lower GI bleed [K92.2]    • Epistaxis [R04.0]    • Electrolyte imbalance [E87.8]    • Thrush [B37.0]    • Incontinence [R32]    • Thrombocytopenia (HCC) [D69.6]    • Hemochromatosis [E83.119]    •  Recurrent syncope [R55]    • History of pulmonary embolus (PE) [Z86.711]    • 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  Thrombocytopenia.  2.  Chronic sinusitis.  3.  Diverticulosis.  4.  Abnormal liver function tests, which seemed as fatty liver.  5.  Hypothyroidism.  6.  History of congestive heart failure.  7.  Essential hypertension.  8.  Adrenal insufficiency.  9.  Depression.  10.  DVT with PE.  11.  Recent lower GI bleed.  12.  Syncope.     PLAN:  1.  Discontinued vancomycin 7/11.  2.  Discontinued meropenem 7/11.  3.  Observe off antibiotics - cardiac intensive care unit.              - Currently no clinical signs of infection  4.  Continue work with patient's other physicians.  5. No changes today      She is doing OK off of antibiotics today and the GI service is evaluating her liver. The Critical Care service has signed off of her case. Her if trend continues without signs/symptoms of infection we will probably sign off soon as well. Case and treatment reviewed with patient, betina, RN and Dr. Linda 35 min on floor in the CICU with 80% face to face view and 100% in direct patient care/counseling/consulting  today.

## 2021-07-14 NOTE — PROGRESS NOTES
Lab called with critical result of Platelet 18 at 1001. Critical lab result read back.   Dr. Lara notified of critical lab result at 1030.  Critical lab result read back by Dr. Lara.    Still awaiting Lab results to transfuse platelets per MD order.

## 2021-07-15 DIAGNOSIS — G43.709 CHRONIC MIGRAINE W/O AURA W/O STATUS MIGRAINOSUS, NOT INTRACTABLE: ICD-10-CM

## 2021-07-15 LAB
AFP-TM SERPL-MCNC: 2 NG/ML (ref 0–9)
ALBUMIN SERPL BCP-MCNC: 2.9 G/DL (ref 3.2–4.9)
ALBUMIN/GLOB SERPL: 2.6 G/DL
ALP SERPL-CCNC: 425 U/L (ref 30–99)
ALT SERPL-CCNC: 71 U/L (ref 2–50)
ANION GAP SERPL CALC-SCNC: 10 MMOL/L (ref 7–16)
ANISOCYTOSIS BLD QL SMEAR: ABNORMAL
AST SERPL-CCNC: 183 U/L (ref 12–45)
BACTERIA BLD CULT: NORMAL
BASOPHILS # BLD AUTO: 0 % (ref 0–1.8)
BASOPHILS # BLD: 0 K/UL (ref 0–0.12)
BILIRUB CONJ SERPL-MCNC: >10 MG/DL (ref 0.1–0.5)
BILIRUB INDIRECT SERPL-MCNC: NORMAL MG/DL (ref 0–1)
BILIRUB SERPL-MCNC: 19.8 MG/DL (ref 0.1–1.5)
BILIRUB SERPL-MCNC: 22.2 MG/DL (ref 0.1–1.5)
BUN SERPL-MCNC: 13 MG/DL (ref 8–22)
CALCIUM SERPL-MCNC: 8.1 MG/DL (ref 8.5–10.5)
CHLORIDE SERPL-SCNC: 100 MMOL/L (ref 96–112)
CO2 SERPL-SCNC: 35 MMOL/L (ref 20–33)
CREAT SERPL-MCNC: <0.17 MG/DL (ref 0.5–1.4)
EOSINOPHIL # BLD AUTO: 0 K/UL (ref 0–0.51)
EOSINOPHIL NFR BLD: 0 % (ref 0–6.9)
ERYTHROCYTE [DISTWIDTH] IN BLOOD BY AUTOMATED COUNT: 60.4 FL (ref 35.9–50)
FIBRINOGEN PPP-MCNC: 154 MG/DL (ref 215–460)
GLOBULIN SER CALC-MCNC: 1.1 G/DL (ref 1.9–3.5)
GLUCOSE SERPL-MCNC: 152 MG/DL (ref 65–99)
HCT VFR BLD AUTO: 23.3 % (ref 37–47)
HGB BLD-MCNC: 7.7 G/DL (ref 12–16)
HYPOCHROMIA BLD QL SMEAR: ABNORMAL
IGG1 SER-MCNC: 254 MG/DL (ref 240–1118)
IGG2 SER-MCNC: 113 MG/DL (ref 124–549)
IGG3 SER-MCNC: 10 MG/DL (ref 21–134)
IGG4 SER-MCNC: 15 MG/DL (ref 1–123)
INR PPP: 1.67 (ref 0.87–1.13)
LACTATE BLD-SCNC: 1.9 MMOL/L (ref 0.5–2)
LYMPHOCYTES # BLD AUTO: 1.43 K/UL (ref 1–4.8)
LYMPHOCYTES NFR BLD: 13.6 % (ref 22–41)
MACROCYTES BLD QL SMEAR: ABNORMAL
MAGNESIUM SERPL-MCNC: 1.7 MG/DL (ref 1.5–2.5)
MANUAL DIFF BLD: NORMAL
MCH RBC QN AUTO: 29.8 PG (ref 27–33)
MCHC RBC AUTO-ENTMCNC: 33 G/DL (ref 33.6–35)
MCV RBC AUTO: 90.3 FL (ref 81.4–97.8)
METAMYELOCYTES NFR BLD MANUAL: 1.8 %
MICROCYTES BLD QL SMEAR: ABNORMAL
MITOCHONDRIA M2 IGG SER-ACNC: 2.2 UNITS (ref 0–24.9)
MONOCYTES # BLD AUTO: 0.58 K/UL (ref 0–0.85)
MONOCYTES NFR BLD AUTO: 5.5 % (ref 0–13.4)
MORPHOLOGY BLD-IMP: NORMAL
MYELOCYTES NFR BLD MANUAL: 2.7 %
NEUTROPHILS # BLD AUTO: 8.02 K/UL (ref 2–7.15)
NEUTROPHILS NFR BLD: 73.7 % (ref 44–72)
NEUTS BAND NFR BLD MANUAL: 2.7 % (ref 0–10)
NRBC # BLD AUTO: 1.21 K/UL
NRBC BLD-RTO: 11.6 /100 WBC
OVALOCYTES BLD QL SMEAR: NORMAL
PA IGG BLD QL FC: NEGATIVE
PA IGM BLD QL FC: NEGATIVE
PLATELET # BLD AUTO: 13 K/UL (ref 164–446)
PLATELET # BLD AUTO: 29 K/UL (ref 164–446)
PLATELET BLD QL SMEAR: NORMAL
PLATELETS.RETICULATED NFR BLD AUTO: 48.8 K/UL (ref 0.6–13.1)
POLYCHROMASIA BLD QL SMEAR: NORMAL
POTASSIUM SERPL-SCNC: 3.1 MMOL/L (ref 3.6–5.5)
PROT SERPL-MCNC: 4 G/DL (ref 6–8.2)
PROTHROMBIN TIME: 19.2 SEC (ref 12–14.6)
RBC # BLD AUTO: 2.58 M/UL (ref 4.2–5.4)
RBC BLD AUTO: PRESENT
SIGNIFICANT IND 70042: NORMAL
SITE SITE: NORMAL
SMA IGG SER-ACNC: 3 UNITS (ref 0–19)
SODIUM SERPL-SCNC: 145 MMOL/L (ref 135–145)
SOLUBLE LIVER IGG SER IA-ACNC: 1.5 U (ref 0–24.9)
SOURCE SOURCE: NORMAL
TARGETS BLD QL SMEAR: NORMAL
WBC # BLD AUTO: 10.5 K/UL (ref 4.8–10.8)

## 2021-07-15 PROCEDURE — 80053 COMPREHEN METABOLIC PANEL: CPT

## 2021-07-15 PROCEDURE — 700111 HCHG RX REV CODE 636 W/ 250 OVERRIDE (IP): Performed by: STUDENT IN AN ORGANIZED HEALTH CARE EDUCATION/TRAINING PROGRAM

## 2021-07-15 PROCEDURE — 82248 BILIRUBIN DIRECT: CPT

## 2021-07-15 PROCEDURE — 82105 ALPHA-FETOPROTEIN SERUM: CPT

## 2021-07-15 PROCEDURE — A9270 NON-COVERED ITEM OR SERVICE: HCPCS | Performed by: INTERNAL MEDICINE

## 2021-07-15 PROCEDURE — A9270 NON-COVERED ITEM OR SERVICE: HCPCS | Performed by: STUDENT IN AN ORGANIZED HEALTH CARE EDUCATION/TRAINING PROGRAM

## 2021-07-15 PROCEDURE — 85007 BL SMEAR W/DIFF WBC COUNT: CPT

## 2021-07-15 PROCEDURE — 85055 RETICULATED PLATELET ASSAY: CPT

## 2021-07-15 PROCEDURE — 83605 ASSAY OF LACTIC ACID: CPT

## 2021-07-15 PROCEDURE — 85049 AUTOMATED PLATELET COUNT: CPT

## 2021-07-15 PROCEDURE — 86850 RBC ANTIBODY SCREEN: CPT

## 2021-07-15 PROCEDURE — A9270 NON-COVERED ITEM OR SERVICE: HCPCS | Performed by: FAMILY MEDICINE

## 2021-07-15 PROCEDURE — 700102 HCHG RX REV CODE 250 W/ 637 OVERRIDE(OP): Performed by: STUDENT IN AN ORGANIZED HEALTH CARE EDUCATION/TRAINING PROGRAM

## 2021-07-15 PROCEDURE — 700102 HCHG RX REV CODE 250 W/ 637 OVERRIDE(OP): Performed by: INTERNAL MEDICINE

## 2021-07-15 PROCEDURE — 99233 SBSQ HOSP IP/OBS HIGH 50: CPT | Mod: GC | Performed by: HOSPITALIST

## 2021-07-15 PROCEDURE — 700102 HCHG RX REV CODE 250 W/ 637 OVERRIDE(OP): Performed by: FAMILY MEDICINE

## 2021-07-15 PROCEDURE — 85027 COMPLETE CBC AUTOMATED: CPT

## 2021-07-15 PROCEDURE — 86901 BLOOD TYPING SEROLOGIC RH(D): CPT

## 2021-07-15 PROCEDURE — 85384 FIBRINOGEN ACTIVITY: CPT

## 2021-07-15 PROCEDURE — 82247 BILIRUBIN TOTAL: CPT

## 2021-07-15 PROCEDURE — 83735 ASSAY OF MAGNESIUM: CPT

## 2021-07-15 PROCEDURE — 85610 PROTHROMBIN TIME: CPT

## 2021-07-15 PROCEDURE — 700111 HCHG RX REV CODE 636 W/ 250 OVERRIDE (IP)

## 2021-07-15 PROCEDURE — 86900 BLOOD TYPING SEROLOGIC ABO: CPT

## 2021-07-15 PROCEDURE — 770020 HCHG ROOM/CARE - TELE (206)

## 2021-07-15 RX ORDER — POTASSIUM CHLORIDE 20 MEQ/1
40 TABLET, EXTENDED RELEASE ORAL ONCE
Status: COMPLETED | OUTPATIENT
Start: 2021-07-15 | End: 2021-07-15

## 2021-07-15 RX ORDER — MAGNESIUM SULFATE HEPTAHYDRATE 40 MG/ML
2 INJECTION, SOLUTION INTRAVENOUS ONCE
Status: COMPLETED | OUTPATIENT
Start: 2021-07-15 | End: 2021-07-15

## 2021-07-15 RX ORDER — OMEPRAZOLE 20 MG/1
40 CAPSULE, DELAYED RELEASE ORAL 2 TIMES DAILY
Status: DISCONTINUED | OUTPATIENT
Start: 2021-07-15 | End: 2021-07-23

## 2021-07-15 RX ORDER — POTASSIUM CHLORIDE 7.45 MG/ML
10 INJECTION INTRAVENOUS
Status: COMPLETED | OUTPATIENT
Start: 2021-07-15 | End: 2021-07-15

## 2021-07-15 RX ADMIN — Medication 2000 UNITS: at 06:14

## 2021-07-15 RX ADMIN — HYDROCORTISONE 20 MG: 20 TABLET ORAL at 06:21

## 2021-07-15 RX ADMIN — FUROSEMIDE 20 MG: 10 INJECTION, SOLUTION INTRAVENOUS at 06:15

## 2021-07-15 RX ADMIN — MUPIROCIN 2 %: 20 OINTMENT TOPICAL at 06:22

## 2021-07-15 RX ADMIN — COLESEVELAM HYDROCHLORIDE 625 MG: 625 TABLET, COATED ORAL at 15:51

## 2021-07-15 RX ADMIN — POTASSIUM CHLORIDE 10 MEQ: 7.46 INJECTION, SOLUTION INTRAVENOUS at 10:05

## 2021-07-15 RX ADMIN — NYSTATIN 500000 UNITS: 100000 SUSPENSION ORAL at 17:30

## 2021-07-15 RX ADMIN — GABAPENTIN 400 MG: 400 CAPSULE ORAL at 06:14

## 2021-07-15 RX ADMIN — Medication: at 06:22

## 2021-07-15 RX ADMIN — NYSTATIN: 100000 POWDER TOPICAL at 12:04

## 2021-07-15 RX ADMIN — FLUDROCORTISONE ACETATE 0.1 MG: 0.1 TABLET ORAL at 17:30

## 2021-07-15 RX ADMIN — NYSTATIN 500000 UNITS: 100000 SUSPENSION ORAL at 08:42

## 2021-07-15 RX ADMIN — FLUDROCORTISONE ACETATE 0.1 MG: 0.1 TABLET ORAL at 06:14

## 2021-07-15 RX ADMIN — POTASSIUM CHLORIDE 40 MEQ: 1500 TABLET, EXTENDED RELEASE ORAL at 06:30

## 2021-07-15 RX ADMIN — NYSTATIN: 100000 POWDER TOPICAL at 06:22

## 2021-07-15 RX ADMIN — COLESEVELAM HYDROCHLORIDE 625 MG: 625 TABLET, COATED ORAL at 21:22

## 2021-07-15 RX ADMIN — DULOXETINE HYDROCHLORIDE 60 MG: 60 CAPSULE, DELAYED RELEASE ORAL at 21:22

## 2021-07-15 RX ADMIN — COLESEVELAM HYDROCHLORIDE 625 MG: 625 TABLET, COATED ORAL at 08:42

## 2021-07-15 RX ADMIN — LIOTHYRONINE SODIUM 25 MCG: 25 TABLET ORAL at 21:22

## 2021-07-15 RX ADMIN — CALCITONIN SALMON 200 UNITS: 200 SPRAY, METERED NASAL at 21:23

## 2021-07-15 RX ADMIN — NYSTATIN: 100000 POWDER TOPICAL at 17:30

## 2021-07-15 RX ADMIN — POTASSIUM CHLORIDE 10 MEQ: 7.46 INJECTION, SOLUTION INTRAVENOUS at 08:50

## 2021-07-15 RX ADMIN — OMEPRAZOLE 40 MG: 20 CAPSULE, DELAYED RELEASE ORAL at 12:04

## 2021-07-15 RX ADMIN — MAGNESIUM SULFATE 2 G: 2 INJECTION INTRAVENOUS at 07:49

## 2021-07-15 RX ADMIN — OMEPRAZOLE 40 MG: 20 CAPSULE, DELAYED RELEASE ORAL at 17:30

## 2021-07-15 RX ADMIN — NYSTATIN 500000 UNITS: 100000 SUSPENSION ORAL at 21:22

## 2021-07-15 RX ADMIN — AMITRIPTYLINE HYDROCHLORIDE 10 MG: 10 TABLET, FILM COATED ORAL at 06:15

## 2021-07-15 RX ADMIN — HYDROCORTISONE 10 MG: 20 TABLET ORAL at 17:30

## 2021-07-15 RX ADMIN — GABAPENTIN 400 MG: 400 CAPSULE ORAL at 17:30

## 2021-07-15 RX ADMIN — MUPIROCIN 1 APPLICATION: 20 OINTMENT TOPICAL at 17:30

## 2021-07-15 RX ADMIN — NYSTATIN 500000 UNITS: 100000 SUSPENSION ORAL at 12:03

## 2021-07-15 RX ADMIN — LEVOTHYROXINE SODIUM 75 MCG: 0.07 TABLET ORAL at 06:15

## 2021-07-15 RX ADMIN — POTASSIUM CHLORIDE 10 MEQ: 7.46 INJECTION, SOLUTION INTRAVENOUS at 06:30

## 2021-07-15 RX ADMIN — POTASSIUM CHLORIDE 10 MEQ: 7.46 INJECTION, SOLUTION INTRAVENOUS at 07:49

## 2021-07-15 ASSESSMENT — ENCOUNTER SYMPTOMS
PALPITATIONS: 0
FALLS: 1
NAUSEA: 0
DOUBLE VISION: 0
CHILLS: 0
SORE THROAT: 1
CONSTIPATION: 0
DIARRHEA: 0
HEMOPTYSIS: 0
COUGH: 0
DIZZINESS: 1
BLOOD IN STOOL: 1
SHORTNESS OF BREATH: 0
FEVER: 0
ABDOMINAL PAIN: 1
BLURRED VISION: 0
VOMITING: 0

## 2021-07-15 ASSESSMENT — FIBROSIS 4 INDEX: FIB4 SCORE: 42.69

## 2021-07-15 ASSESSMENT — PAIN DESCRIPTION - PAIN TYPE: TYPE: ACUTE PAIN

## 2021-07-15 NOTE — DIETARY
Nutrition Services: Update   Day 6 of admit.  Donna Isaac is a 56 y.o. female with admitting DX of GI bleed and Ischemic colitis.    Pt is currently on cardiac diet. PO intake is variable with 2 meals <25-50% and 2 meals 50-75%. Wt 7/14: 110.5 kg via bed scale - weight fluctuates. One incorrect weight in as 50 kg/110 lbs and likely meant 110 kg. Pt is currently -5.8L fluids per I/O.     Malnutrition Risk: Severe malnutrition noted by RD on 7/10.    Recommendations/Plan:  1. Continue current diet and encourage PO intake as able.  2. Encourage intake of meals  3. Document intake of all meals as % taken in ADL's to provide interdisciplinary communication across all shifts.   4. Monitor weight.  5. Nutrition rep will continue to see patient for ongoing meal and snack preferences.  6. Obtain supplement order per RD as needed.    RD following

## 2021-07-15 NOTE — FLOWSHEET NOTE
Report received from Magy ROBERTSON. Updated on POC.  Assumed care of patient upon arrival to unit. Patient currently A & O x 4; on 1 L O2 NC; up BR/TQ2; No complaints of acute pain. Pt placed on monitor, monitor room notified, SR/ST. Patient oriented to unit and to call light system. Call light within reach. Pt educated to fall risk. Fall precautions in place. Pt provided with personal grooming items. Bed locked and in lowest position. All questions answered. No other needs indicated at this time.  at bedside.

## 2021-07-15 NOTE — CARE PLAN
Problem: Knowledge Deficit - Standard  Goal: Patient and family/care givers will demonstrate understanding of plan of care, disease process/condition, diagnostic tests and medications  Outcome: Progressing     Problem: Skin Integrity  Goal: Skin integrity is maintained or improved  Outcome: Progressing     The patient is Watcher - Medium risk of patient condition declining or worsening    Shift Goals  Clinical Goals: monitor labs & VS; increase mobility; cause for syncope?  Patient Goals: rest; relax    Progress made toward(s) clinical / shift goals:  Pt educated regarding plan of care and medications. All questions answered.   Will continue to monitor skin breakdown, while continuing with Q2H turns, waffle, and pillows to help with support.     Patient is not progressing towards the following goals:

## 2021-07-15 NOTE — PROGRESS NOTES
This Rn was notified by lab about pt's platelets resulting at 13. Provided update to Dr. Julia Lipscomb about platelets as well as this lab was drawn before Platelet transfusion started. Transfusion ended at 0128.     Waiting for new orders at this time.

## 2021-07-15 NOTE — PROGRESS NOTES
Received report from AILYN Pereira. Reviewed POC, no questions at this time. Call light is within reach, bed is in lowest/locked position.
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
## 2021-07-15 NOTE — PROGRESS NOTES
Daily Progress Note:     Date of Service: 7/14/2021  Primary Team: [facility] Blue Team   Attending: GARRY Luis M.D.   Senior Resident: Dr. Mistry  Intern: Dr. Lara  Contact:  [phone]    Chief Complaint:  Blood in stool.    ID:  Ms. Isaac is a 56 year old F with a h/o chronic sinusitis, Dale's disease, and diverticulitis admitted for blood in stool.  Pt also c/o lower abdominal pain, frequent falling, increased weight 40 lbs, and dizziness.      Subjective:  Pt's AM labs were noted to be very low by nurse (20,000 cells/ml).  Otherwise no overnight events.  Pt has been transferred back into our service at 0600.  I spoke with her at the bedside.  She mentioned that she still has abd pain of the upper abdomen.  She also c/o BUE/BLE swelling, but it is improved from before her transfer to the ICU.      Interval History:  7/14--antibodies against platelets were ordered per GI's recommendation to r/o a potential consumptive source for platelets.      Consultants/Specialty:  General Surgery - Nikita GERMAN- Amrit    Review of Systems:    Review of Systems   Constitutional: Positive for malaise/fatigue. Negative for chills and fever.        Positive for weight gain (40 lbs) over the past 2-3 months.    Positive for loss of appetite secondary to oral lesions.   HENT: Positive for sore throat (chronic MRSA sinusitis).    Eyes: Negative for blurred vision and double vision.   Respiratory: Negative for cough and hemoptysis.    Cardiovascular: Positive for leg swelling (BLE;). Negative for chest pain and palpitations.        Positive for swelling of bilateral upper extremities   Gastrointestinal: Positive for abdominal pain (lower quarants) and blood in stool. Negative for constipation, melena and vomiting.   Genitourinary: Negative for dysuria.   Musculoskeletal: Positive for falls.   Neurological: Positive for dizziness.       Objective Data:   Physical Exam:   Vitals:   Temp:  [36.2 °C (97.1  °F)-36.6 °C (97.8 °F)] 36.4 °C (97.5 °F)  Pulse:  [] 98  Resp:  [14-21] 18  BP: (130-164)/() 149/96  SpO2:  [94 %-97 %] 97 %    Physical Exam  Vitals and nursing note reviewed.   Constitutional:       Appearance: She is obese. She is ill-appearing.   HENT:      Head: Normocephalic and atraumatic.      Mouth/Throat:      Pharynx: Posterior oropharyngeal erythema present.      Comments: Thrush noted in the buccal mucosa and OP.    Eyes:      General: Scleral icterus (bilateral eyes) present.      Extraocular Movements: Extraocular movements intact.      Pupils: Pupils are equal, round, and reactive to light.   Cardiovascular:      Rate and Rhythm: Normal rate and regular rhythm.      Comments: Anasarca noted with 3+-4+ pitting edema  Pulmonary:      Effort: No respiratory distress.      Breath sounds: No stridor.   Abdominal:      General: Bowel sounds are normal.      Tenderness: There is abdominal tenderness (TTP LLQ and RLQ).   Genitourinary:     Comments: Hemoccult test deferred, but pt has been hemoccult positive upon admission as noted by admitting hospitalist.  Musculoskeletal:      Cervical back: Normal range of motion and neck supple.   Skin:     Capillary Refill: Capillary refill takes more than 3 seconds.      Comments: Large purple mass [id number] noted to the right antecubetal fossa consistent with new hematoma.   Neurological:      Mental Status: She is oriented to person, place, and time.           Labs:   Recent Results (from the past 24 hour(s))   CBC WITH DIFFERENTIAL    Collection Time: 07/13/21  8:50 PM   Result Value Ref Range    WBC 4.6 (L) 4.8 - 10.8 K/uL    RBC 2.76 (L) 4.20 - 5.40 M/uL    Hemoglobin 8.3 (L) 12.0 - 16.0 g/dL    Hematocrit 25.1 (L) 37.0 - 47.0 %    MCV 90.9 81.4 - 97.8 fL    MCH 30.1 27.0 - 33.0 pg    MCHC 33.1 (L) 33.6 - 35.0 g/dL    RDW 59.5 (H) 35.9 - 50.0 fL    Platelet Count 21 (LL) 164 - 446 K/uL    Neutrophils-Polys 61.70 44.00 - 72.00 %    Lymphocytes 30.00  22.00 - 41.00 %    Monocytes 7.40 0.00 - 13.40 %    Eosinophils 0.00 0.00 - 6.90 %    Basophils 0.00 0.00 - 1.80 %    Nucleated RBC 11.10 /100 WBC    Neutrophils (Absolute) 2.84 2.00 - 7.15 K/uL    Lymphs (Absolute) 1.38 1.00 - 4.80 K/uL    Monos (Absolute) 0.34 0.00 - 0.85 K/uL    Eos (Absolute) 0.00 0.00 - 0.51 K/uL    Baso (Absolute) 0.00 0.00 - 0.12 K/uL    NRBC (Absolute) 0.51 K/uL    Anisocytosis 2+ (A)     Macrocytosis 2+ (A)    DIFFERENTIAL MANUAL    Collection Time: 07/13/21  8:50 PM   Result Value Ref Range    Metamyelocytes 0.90 %    Manual Diff Status PERFORMED    PERIPHERAL SMEAR REVIEW    Collection Time: 07/13/21  8:50 PM   Result Value Ref Range    Peripheral Smear Review see below    PLATELET ESTIMATE    Collection Time: 07/13/21  8:50 PM   Result Value Ref Range    Plt Estimation Marked Decrease    MORPHOLOGY    Collection Time: 07/13/21  8:50 PM   Result Value Ref Range    RBC Morphology Present     Polychromia 1+     Poikilocytosis 2+     Target Cells 2+    IMMATURE PLT FRACTION    Collection Time: 07/13/21  8:50 PM   Result Value Ref Range    Imm. Plt Fraction 17.3 (H) 0.6 - 13.1 K/uL   CBC WITH DIFFERENTIAL    Collection Time: 07/14/21  4:10 AM   Result Value Ref Range    WBC 6.5 4.8 - 10.8 K/uL    RBC 2.79 (L) 4.20 - 5.40 M/uL    Hemoglobin 8.3 (L) 12.0 - 16.0 g/dL    Hematocrit 25.0 (L) 37.0 - 47.0 %    MCV 89.6 81.4 - 97.8 fL    MCH 29.7 27.0 - 33.0 pg    MCHC 33.2 (L) 33.6 - 35.0 g/dL    RDW 59.1 (H) 35.9 - 50.0 fL    Platelet Count 20 (LL) 164 - 446 K/uL    Neutrophils-Polys 37.60 (L) 44.00 - 72.00 %    Lymphocytes 55.10 (H) 22.00 - 41.00 %    Monocytes 7.30 0.00 - 13.40 %    Eosinophils 0.00 0.00 - 6.90 %    Basophils 0.00 0.00 - 1.80 %    Nucleated RBC 11.70 /100 WBC    Neutrophils (Absolute) 2.44 2.00 - 7.15 K/uL    Lymphs (Absolute) 3.58 1.00 - 4.80 K/uL    Monos (Absolute) 0.47 0.00 - 0.85 K/uL    Eos (Absolute) 0.00 0.00 - 0.51 K/uL    Baso (Absolute) 0.00 0.00 - 0.12 K/uL    NRBC  (Absolute) 0.76 K/uL    Anisocytosis 2+ (A)     Macrocytosis 1+     Microcytosis 1+    Comp Metabolic Panel    Collection Time: 07/14/21  4:10 AM   Result Value Ref Range    Sodium 142 135 - 145 mmol/L    Potassium 3.1 (L) 3.6 - 5.5 mmol/L    Chloride 98 96 - 112 mmol/L    Co2 34 (H) 20 - 33 mmol/L    Anion Gap 10.0 7.0 - 16.0    Glucose 135 (H) 65 - 99 mg/dL    Bun 17 8 - 22 mg/dL    Creatinine 0.34 (L) 0.50 - 1.40 mg/dL    Calcium 8.2 (L) 8.5 - 10.5 mg/dL    AST(SGOT) 171 (H) 12 - 45 U/L    ALT(SGPT) 71 (H) 2 - 50 U/L    Alkaline Phosphatase 366 (H) 30 - 99 U/L    Total Bilirubin 17.8 (H) 0.1 - 1.5 mg/dL    Albumin 3.1 (L) 3.2 - 4.9 g/dL    Total Protein 4.1 (L) 6.0 - 8.2 g/dL    Globulin 1.0 (L) 1.9 - 3.5 g/dL    A-G Ratio 3.1 g/dL   Prothrombin Time    Collection Time: 07/14/21  4:10 AM   Result Value Ref Range    PT 19.1 (H) 12.0 - 14.6 sec    INR 1.66 (H) 0.87 - 1.13   FIBRINOGEN    Collection Time: 07/14/21  4:10 AM   Result Value Ref Range    Fibrinogen 134 (L) 215 - 460 mg/dL   LACTIC ACID    Collection Time: 07/14/21  4:10 AM   Result Value Ref Range    Lactic Acid 3.3 (H) 0.5 - 2.0 mmol/L   ESTIMATED GFR    Collection Time: 07/14/21  4:10 AM   Result Value Ref Range    GFR If African American >60 >60 mL/min/1.73 m 2    GFR If Non African American >60 >60 mL/min/1.73 m 2   DIFFERENTIAL MANUAL    Collection Time: 07/14/21  4:10 AM   Result Value Ref Range    Manual Diff Status PERFORMED    PERIPHERAL SMEAR REVIEW    Collection Time: 07/14/21  4:10 AM   Result Value Ref Range    Peripheral Smear Review see below    PLATELET ESTIMATE    Collection Time: 07/14/21  4:10 AM   Result Value Ref Range    Plt Estimation Marked Decrease    MORPHOLOGY    Collection Time: 07/14/21  4:10 AM   Result Value Ref Range    RBC Morphology Present     Polychromia 1+     Poikilocytosis 2+     Target Cells 2+     Smudge Cells Few    IMMATURE PLT FRACTION    Collection Time: 07/14/21  4:10 AM   Result Value Ref Range    Imm. Plt  Fraction 25.3 (H) 0.6 - 13.1 K/uL   CBC WITH DIFFERENTIAL    Collection Time: 07/14/21  8:25 AM   Result Value Ref Range    WBC 7.5 4.8 - 10.8 K/uL    RBC 2.84 (L) 4.20 - 5.40 M/uL    Hemoglobin 8.5 (L) 12.0 - 16.0 g/dL    Hematocrit 25.5 (L) 37.0 - 47.0 %    MCV 89.8 81.4 - 97.8 fL    MCH 29.9 27.0 - 33.0 pg    MCHC 33.3 (L) 33.6 - 35.0 g/dL    RDW 59.7 (H) 35.9 - 50.0 fL    Platelet Count 18 (LL) 164 - 446 K/uL    Neutrophils-Polys 60.50 44.00 - 72.00 %    Lymphocytes 31.20 22.00 - 41.00 %    Monocytes 4.60 0.00 - 13.40 %    Eosinophils 0.50 0.00 - 6.90 %    Basophils 0.00 0.00 - 1.80 %    Nucleated RBC 13.50 /100 WBC    Neutrophils (Absolute) 4.58 2.00 - 7.15 K/uL    Lymphs (Absolute) 2.34 1.00 - 4.80 K/uL    Monos (Absolute) 0.35 0.00 - 0.85 K/uL    Eos (Absolute) 0.04 0.00 - 0.51 K/uL    Baso (Absolute) 0.00 0.00 - 0.12 K/uL    NRBC (Absolute) 1.01 K/uL    Hypochromia 1+     Anisocytosis 1+     Macrocytosis 1+    DIFFERENTIAL MANUAL    Collection Time: 07/14/21  8:25 AM   Result Value Ref Range    Bands-Stabs 0.50 0.00 - 10.00 %    Metamyelocytes 0.90 %    Myelocytes 1.80 %    Manual Diff Status PERFORMED    PERIPHERAL SMEAR REVIEW    Collection Time: 07/14/21  8:25 AM   Result Value Ref Range    Peripheral Smear Review see below    PLATELET ESTIMATE    Collection Time: 07/14/21  8:25 AM   Result Value Ref Range    Plt Estimation Marked Decrease    MORPHOLOGY    Collection Time: 07/14/21  8:25 AM   Result Value Ref Range    RBC Morphology Present     Giant Platelets 1+     Polychromia 1+     Poikilocytosis 2+     Ovalocytes 1+     Target Cells 2+     Tear Drop Cells 1+    IMMATURE PLT FRACTION    Collection Time: 07/14/21  8:25 AM   Result Value Ref Range    Imm. Plt Fraction 28.1 (H) 0.6 - 13.1 K/uL       Imaging:   Independant Imaging Review: Completed  US-ABDOMEN LIMITED WITH ELASTOGRAPHY (COMBO)   Final Result         1.  Hepatomegaly and a wave velocity 1.26 m/s.      Per Radiology Consensus Guidelines  (2015)      Median values <1.35 m/sec: Low risk for clinically significant fibrosis   (METAVIR </= F2)      Median values >2.20 m/sec: High risk for advanced fibrosis/cirrhosis   (METAVIR - F3/F4)      Median values of 1.36 - 2.19 m/sec: Indeterminate for fibrosis      **Note that inflammation and venous congestion can cause falsely elevated velocities.      DX-CHEST-PORTABLE (1 VIEW)   Final Result      1.  Satisfactory appearance of the newly placed left central venous catheter projecting over the distal SVC without pneumothorax.   2.  Enlarged cardiac silhouette.   3.  Right lower lobe opacity could be atelectasis or pneumonia.      DX-SINUSES-PARANASAL COMPLETE 3+   Final Result      1.  There is minimal left maxillary mucosal thickening as was seen on the recent CT head.   2.  There is no evidence of acute sinusitis.      US-RUQ   Final Result      1.  The liver is diffusely echogenic, most compatible with fatty infiltration, however other hepatocellular disease processes are in the differential diagnosis.   2.  Gallbladder is surgically absent. There is no biliary dilatation.      CTA ABDOMEN PELVIS W & W/O POST PROCESS   Final Result      1.  No active GI hemorrhage identified      2.  Postoperative changes of the stomach transverse colon      3.  Hepatic steatosis and hepatomegaly, both severe      4.  Small amount of ascites      DX-CHEST-PORTABLE (1 VIEW)   Final Result      1.  Hypoinflation without other evidence for acute cardiopulmonary disease.   2.  Supportive tubing is unchanged.      CT-TSPINE W/O PLUS RECONS   Final Result      1.  No acute osseous abnormality.   2.  Mild spondylosis.      CT-LSPINE W/O PLUS RECONS   Final Result      1.  No acute osseous abnormality.   2.  Postsurgical and mild degenerative changes as detailed.   3.  Hepatic steatosis.      CT-ABDOMEN-PELVIS W/O   Final Result      1.  Hepatic steatosis.   2.  New third spacing of fluid/anasarca with small amount of ascites and  some generalized subcutaneous edema.   3.  Small focus of air involving the nondependent aspect of the urinary bladder which may be within the wall of the bladder or possibly within a small collapsed diverticulum. Correlate with urinalysis. Focal emphysematous cystitis cannot be excluded.   4.  Postsurgical changes as detailed.      These findings were discussed with MARIETTA ANDERSON on 7/9/2021 12:37 PM.             Problem Representation:    Thrombocytopenia (HCC)- (present on admission)  Assessment & Plan  - pt had a plt count of 30 upon admission on 7/7.  - looking at pt's results on Epic, pt was also thrombocytopenic on admission from 5/21-5/29, but then on 6/15-6/25 plt counts were normal.  - No clear etiology; first hypothesis ruled in ITP (WBCs and RBCs did not decline as platelets)   - Hematology consulted Dr. Gastelum who recommended plt tranfusion  - DIC not suspected clinically bc severe DIC often presents with thrombocytopenia  - No major reports on Xerava being associated with thrombocytopenia.  - CT negative for splenomegaly r/o underlying liver disease as source for thrombocytopenia.  - Thombocytopenia is sometimes seen in the setting of sepsis, but pt has been afebrile and WBC counts have not been elevated during this visit making this an unlikely source.    Electrolyte imbalance- (present on admission)  Assessment & Plan  Hyponatremia likely due to dehydration.  Judicial IV fluids considering chronic peripheral edema.    Lower GI bleed- (present on admission)  Assessment & Plan  Likely diverticular bleed in the light of thrombocytopenia and being on Eliquis per  Bradycardia has resolved with last bowel movement this a.m. was nonbloody.  H&H stable.  Continue to monitor H&H.  Transfuse if hemoglobin drops below 7 g/dL.    Recurrent syncope  Assessment & Plan  Chronic for few years now was attributed to adrenal insufficiency.  Has been more frequent lately.  Start on stress dose Solu-Cortef as mentioned  above.  Continue home dose midodrine.  Gentle IV fluid resuscitation.  Fall precautions.    Severe protein-calorie malnutrition (HCC)- (present on admission)  Assessment & Plan  Patient has been having pain and burning in her buccal mucosa and throat and her p.o. intake has been almost 0 over the last few days.  Thrush was noted on exam.  We will treat.  Encourage supplements between meals.  Encourage p.o. intake  Dietitian consulted.    Thrush- (present on admission)  Assessment & Plan  Started on nystatin oral solution swish and swallow.  If no improvement clinically, could consider to add Diflucan.    Incontinence- (present on admission)  Assessment & Plan  Complain of both urinary and fecal incontinence which is new in the setting of lower back pain and numbness across the lower back area that started 3 months ago after a fall.  We will check thoracic and lumbar spine CT.  Fall precautions.    Epistaxis- (present on admission)  Assessment & Plan  Suspecting due to nonhumidified oxygen through nose in the setting of thrombocytopenia.  Now resolved.  Consider humidified oxygen if needed while inpatient.    Hemochromatosis- (present on admission)  Assessment & Plan  History of PE  Follow-up with gastroenterology as an outpatient.    Now with worsening LFTs and increasing bilirubin likely due to starvation.  Continue to monitor for now.  Avoid hepatotoxic medications.    History of pulmonary embolus (PE)  Assessment & Plan  After COVID-19 vaccination as per patient?.  CTA of chest done on 4/27/2021 showed small left lower lobe subsegmental PE.  However repeat CTA on 5/10/2021 showed resolution.  Patient supposed to be on Eliquis for 3-month.  However, will hold for now due to thrombocytopenia and lower GI bleed.    Adrenal insufficiency (Valentín's disease) (HCC)- (present on admission)  Assessment & Plan  History of.  Normally on Cortef oral.  We will start stress dose Solu-Cortef considering patient has been having  frequent syncopal episodes lately for now and then gradually wean down back to home dose oral Cortef.    Chronic sinusitis  Assessment & Plan  History of chronic MRSA sinusitis.  On Xerava per ID recommendations.  We will continue.  MRSA PCR pending.      Tubes: pIV x2  Fluids: D5 NS  Diet: NPO - bowel rest.  DVT prophylaxis: SCD  Antibiotics: Meropenem and Vancomycin  Code Status: Full  Disposition: Stable

## 2021-07-15 NOTE — PROGRESS NOTES
Called lab about how to determine whether platelet decrease following transfusion was due to alloimmunization or consumptive issue.  Per hematology note, appears that decrease was less likely ITP due to timeline. Per lab HLA order protocol, risk of alloimmunization is determined by corrected platelet count for transfusion. There was no value 1 hour after prior transfusion, however on 7/11 at 7:20 AM PLT 26 and at 12:49 possibly right after transfusion, count was 92K, 24 hours later  PLT count was 64, an increment of 28,600 post transfusion. 20 hour increment > 4,800 indicated platelet transfusion refractoriness is unlikely, so decrease in PLT count is possibly due to DIC and splenic sequestration and hemochromatosis.       PLT count at 11 PM dropped to 13. Ordered 1 U Platelets and PLT  drawn 1 hour after transfusion was 26,  refractoriness calculated as unlikely based on platelet increase post transfusion.

## 2021-07-15 NOTE — CARE PLAN
Problem: Knowledge Deficit - Standard  Goal: Patient and family/care givers will demonstrate understanding of plan of care, disease process/condition, diagnostic tests and medications  Outcome: Progressing     Problem: Fall Risk  Goal: Patient will remain free from falls  Outcome: Progressing     Problem: Skin Integrity  Goal: Skin integrity is maintained or improved  Outcome: Progressing   The patient is Watcher - Medium risk of patient condition declining or worsening    Shift Goals  Clinical Goals: improve labs  Patient Goals: rest, swelling to improve    Progress made toward(s) clinical / shift goals:  labs being replaced    Patient is not progressing towards the following goals:

## 2021-07-15 NOTE — DISCHARGE PLANNING
Following for post acute services therapy note 07/10 dependent for mobility and ADL may need update therapy notes as medically able to assist in determining appropriate post acute services. Will follow.

## 2021-07-15 NOTE — PROGRESS NOTES
Spoke to Dr. Lipscomb in regards Potassium resulting at 3.1 even though it was replaced on 7/14.   Waiting for new orders at this time.

## 2021-07-15 NOTE — FLOWSHEET NOTE
4 Eyes Skin Assessment Completed by AILYN Pereira and AILYN Jules.    Head Blanching, Swelling and Redness, Jaundice  Ears Redness and Blanching  Nose Redness and Blanching  Mouth WDL  Neck Redness and Blanching- bruising noted surrounding central line and up onto her L shoulder/ back  Breast/Chest WDL  Shoulder Blades- bruising outlined on L shoulder  Spine WDL  (R) Arm/Elbow/Hand Redness, Blanching, Abrasion, Scab, Swelling and Edema  (L) Arm/Elbow/Hand Redness, Blanching, Abrasion, Scab, Swelling and Edema  Abdomen WDL  Groin Redness, Blanching, Excoriation and Swelling  Scrotum/Coccyx/Buttocks Redness, Non-Blanching, Excoriation and Moisture Fissure  (R) Leg Redness, Swelling and Edema  (L) Leg Redness, Swelling and Edema  (R) Heel/Foot/Toe Non Blanching, Redness, Boggy, Scab, Edema, Wound  (L) Heel/Foot/Toe Non Blanching, Redness, Boggy, scattered scabbing, edema          Devices In Places Tele Box, Central Line and Nasal Cannula, ELBA PICC, purewick      Interventions In Place NC W/Ear Foams, Heel Mepilex, Chair Waffle, Pillows, Q2 Turns, Barrier Cream, Heels Loaded W/Pillows and Pressure Redistribution Mattress, Barrier Paste    Possible Skin Injury Yes    Pictures Uploaded Into Epic Yes  Wound Consult Placed Yes  RN Wound Prevention Protocol Ordered Yes

## 2021-07-15 NOTE — PROGRESS NOTES
Infectious Disease Progress Note    Author: Ney Rdz M.D. Date & Time created: 7/15/2021  3:32 PM     Antibiotics - None     Previous:  7/9-7/11: Meropenem  7/9-7/10: Vanco     7/12: No specific new complaints some abdominal tenderness unchanged from 7/11 without fever or leukocytosis.  7/13: No acute issues overnight. No fevers.  7/14: She is doing OK off of antibiotics today and the GI service is evaluating her liver.       Interval History:  Past 24 hrs reviewed with RN    Labs Reviewed, Medications Reviewed, Radiology Reviewed, Wound Reviewed, Fluids Reviewed and GI Nutrition Reviewed    Review of Systems:  Review of Systems   Constitutional: Negative for chills and fever.   Respiratory: Negative for cough and shortness of breath.    Cardiovascular: Negative for chest pain.   Gastrointestinal: Positive for abdominal pain (upper half RUQ > LUQ). Negative for constipation, diarrhea, nausea and vomiting.   Genitourinary: Negative for dysuria.       Physical Exam:  Physical Exam  Vitals and nursing note reviewed.   Constitutional:       General: She is not in acute distress.     Appearance: She is obese. She is not ill-appearing, toxic-appearing or diaphoretic.   HENT:      Head: Normocephalic and atraumatic.   Cardiovascular:      Rate and Rhythm: Normal rate and regular rhythm.      Heart sounds: No murmur heard.     Pulmonary:      Effort: Pulmonary effort is normal. No respiratory distress.      Breath sounds: No wheezing or rhonchi.   Abdominal:      General: Abdomen is flat. There is no distension.      Palpations: Abdomen is soft.      Tenderness: There is abdominal tenderness (RUQ > LUQ and some in deep LLQ). There is no guarding or rebound.   Skin:     General: Skin is warm and dry.   Neurological:      Mental Status: She is alert and oriented to person, place, and time.   Psychiatric:         Mood and Affect: Mood normal.         Behavior: Behavior normal.         Labs:  Recent Results (from the  past 24 hour(s))   CBC WITH DIFFERENTIAL    Collection Time: 07/14/21 11:16 PM   Result Value Ref Range    WBC 10.5 4.8 - 10.8 K/uL    RBC 2.58 (L) 4.20 - 5.40 M/uL    Hemoglobin 7.7 (L) 12.0 - 16.0 g/dL    Hematocrit 23.3 (L) 37.0 - 47.0 %    MCV 90.3 81.4 - 97.8 fL    MCH 29.8 27.0 - 33.0 pg    MCHC 33.0 (L) 33.6 - 35.0 g/dL    RDW 60.4 (H) 35.9 - 50.0 fL    Platelet Count 13 (LL) 164 - 446 K/uL    Neutrophils-Polys 73.70 (H) 44.00 - 72.00 %    Lymphocytes 13.60 (L) 22.00 - 41.00 %    Monocytes 5.50 0.00 - 13.40 %    Eosinophils 0.00 0.00 - 6.90 %    Basophils 0.00 0.00 - 1.80 %    Nucleated RBC 11.60 /100 WBC    Neutrophils (Absolute) 8.02 (H) 2.00 - 7.15 K/uL    Lymphs (Absolute) 1.43 1.00 - 4.80 K/uL    Monos (Absolute) 0.58 0.00 - 0.85 K/uL    Eos (Absolute) 0.00 0.00 - 0.51 K/uL    Baso (Absolute) 0.00 0.00 - 0.12 K/uL    NRBC (Absolute) 1.21 K/uL    Hypochromia 1+     Anisocytosis 2+ (A)     Macrocytosis 2+ (A)     Microcytosis 1+    DIFFERENTIAL MANUAL    Collection Time: 07/14/21 11:16 PM   Result Value Ref Range    Bands-Stabs 2.70 0.00 - 10.00 %    Metamyelocytes 1.80 %    Myelocytes 2.70 %    Manual Diff Status PERFORMED    PERIPHERAL SMEAR REVIEW    Collection Time: 07/14/21 11:16 PM   Result Value Ref Range    Peripheral Smear Review see below    PLATELET ESTIMATE    Collection Time: 07/14/21 11:16 PM   Result Value Ref Range    Plt Estimation Marked Decrease    MORPHOLOGY    Collection Time: 07/14/21 11:16 PM   Result Value Ref Range    RBC Morphology Present     Polychromia 2+     Ovalocytes 2+     Target Cells 2+    IMMATURE PLT FRACTION    Collection Time: 07/14/21 11:16 PM   Result Value Ref Range    Imm. Plt Fraction 48.8 (H) 0.6 - 13.1 K/uL   Comp Metabolic Panel    Collection Time: 07/15/21  2:27 AM   Result Value Ref Range    Sodium 145 135 - 145 mmol/L    Potassium 3.1 (L) 3.6 - 5.5 mmol/L    Chloride 100 96 - 112 mmol/L    Co2 35 (H) 20 - 33 mmol/L    Anion Gap 10.0 7.0 - 16.0    Glucose  "152 (H) 65 - 99 mg/dL    Bun 13 8 - 22 mg/dL    Creatinine <0.17 (L) 0.50 - 1.40 mg/dL    Calcium 8.1 (L) 8.5 - 10.5 mg/dL    AST(SGOT) 183 (H) 12 - 45 U/L    ALT(SGPT) 71 (H) 2 - 50 U/L    Alkaline Phosphatase 425 (H) 30 - 99 U/L    Total Bilirubin 19.8 (H) 0.1 - 1.5 mg/dL    Albumin 2.9 (L) 3.2 - 4.9 g/dL    Total Protein 4.0 (L) 6.0 - 8.2 g/dL    Globulin 1.1 (L) 1.9 - 3.5 g/dL    A-G Ratio 2.6 g/dL   Prothrombin Time    Collection Time: 07/15/21  2:27 AM   Result Value Ref Range    PT 19.2 (H) 12.0 - 14.6 sec    INR 1.67 (H) 0.87 - 1.13   FIBRINOGEN    Collection Time: 07/15/21  2:27 AM   Result Value Ref Range    Fibrinogen 154 (L) 215 - 460 mg/dL   LACTIC ACID    Collection Time: 07/15/21  2:27 AM   Result Value Ref Range    Lactic Acid 1.9 0.5 - 2.0 mmol/L   PLATELET COUNT    Collection Time: 07/15/21  2:27 AM   Result Value Ref Range    Platelet Count 29 (LL) 164 - 446 K/uL   ESTIMATED GFR    Collection Time: 07/15/21  2:27 AM   Result Value Ref Range    GFR If African American >60 >60 mL/min/1.73 m 2    GFR If Non African American >60 >60 mL/min/1.73 m 2   MAGNESIUM    Collection Time: 07/15/21  2:27 AM   Result Value Ref Range    Magnesium 1.7 1.5 - 2.5 mg/dL     Results     Procedure Component Value Units Date/Time    BLOOD CULTURE [[id number]] Collected: 07/09/21 2229    Order Status: Completed Specimen: Blood from Peripheral Updated: 07/15/21 0100     Significant Indicator NEG     Source BLD     Site PERIPHERAL     Culture Result No growth after 5 days of incubation.    Narrative:      Per Hospital Policy: Only change Specimen Src: to \"Line\" if  specified by physician order.  Right Forearm/Arm    BLOOD CULTURE [[id number]] Collected: 07/09/21 1819    Order Status: Completed Specimen: Blood from Peripheral Updated: 07/14/21 2100     Significant Indicator NEG     Source BLD     Site PERIPHERAL     Culture Result No growth after 5 days of incubation.    Narrative:      Per Hospital Policy: Only change " "Specimen Src: to \"Line\" if  specified by physician order.  Left Wrist    URINALYSIS (UA) [[id number]]  (Abnormal) Collected: 07/10/21 0145    Order Status: Completed Specimen: Urine Updated: 07/10/21 0304     Color DK Yellow     Character Cloudy     Specific Gravity 1.015     Ph 5.0     Glucose Negative mg/dL      Ketones Negative mg/dL      Protein Negative mg/dL      Bilirubin Large     Urobilinogen, Urine 0.2     Nitrite Positive     Leukocyte Esterase Trace     Occult Blood Negative     Micro Urine Req Microscopic    Blood Culture [[id number]] Collected: 07/10/21 0000    Order Status: Canceled Specimen: Other from Peripheral     MRSA By PCR (Amp) [[id number]]  (Abnormal) Collected: 21 1833    Order Status: Completed Specimen: Respirate from Nares Updated: 21     Significant Indicator POS     Source RESP     Site NARES     MRSA PCR POSITIVE for MRSA by PCR.    Narrative:      CALL  [facility]  [address] tel. [phone],  CALLED  183 tel. [phone] 2021, 22:31, RB PERF. RESULTS CALLED  TO:[phone], faxed [phone].  Collected By:[id number] JUSTO SUAREZ  Collected By:[id number] JUSTO SUAREZ    URINALYSIS [[id number]] Collected: 21 0000    Order Status: Canceled Specimen: Urine, Clean Catch         Hemodynamics:  Temp (24hrs), Av.6 °C (97.9 °F), Min:36.2 °C (97.2 °F), Max:37.2 °C (98.9 °F)  Temperature: 36.7 °C (98 °F)  Pulse  Av.9  Min: 54  Max: 114   Blood Pressure: 148/105     PICC Single Lumen Left Basilic (Active)   Site Assessment Clean;Dry;Intact 07/15/21 0800   Line Status Scrubbed the hub prior to access;Blood return noted;Flushed;Saline locked 07/15/21 0800   Dressing Type Biopatch;Transparent 07/15/21 0800   Dressing Status Clean;Dry;Intact 07/15/21 08   Dressing Intervention N/A 21   Dressing Change Due 07/17/21 07/15/21 0800   Date Primary Tubing Changed 21 2100   Date Secondary Tubing Changed 21   NEXT Primary Tubing Change  21 " 07/11/21 2100   NEXT Secondary Tubing Change  07/13/21 07/11/21 2100   NEXT IV Connector(s) Change 07/17/21 07/11/21 2100   Line Necessity Assessed Lack of Peripheral Access 07/15/21 0800   $ Single Lumen PICC Charge Single kit used 07/09/21 2100       CVC Triple Lumen 07/11/21 Non-tunneled Left Internal jugular (Active)   Site Assessment Clean;Dry;Intact 07/15/21 0800   Lumen 1 Status Scrubbed the hub prior to access;Blood return noted;Flushed;Normal saline locked 07/15/21 0800   Lumen 3 Status Scrubbed the hub prior to access;Blood return noted;Flushed;Normal saline locked 07/15/21 0800   Lumen 2 Status Scrubbed the hub prior to access;Blood return noted;Flushed;Normal saline locked 07/15/21 0800   Dressing Type Biopatch;Transparent 07/15/21 0800   Dressing Status Intact;Old drainage 07/15/21 0800   Dressing Intervention Dressing changed per protocol 07/11/21 2300   Dressing Change Due 07/17/21 07/15/21 0800   NEXT Primary Tubing Change  07/13/21 07/11/21 2300   NEXT Secondary Tubing Change  07/13/21 07/11/21 2300   NEXT IV Connector(s) Change 07/17/21 07/11/21 2300   Waveform Not Applicable 07/13/21 0800   Line Calibrated Not Applicable 07/13/21 0800   Line Necessity Assessed Lack of Peripheral Access 07/14/21 1920     Wound:  @WOUNDLDA(4)@     Fluids:  Intake/Output                             07/13/21 0700 - 07/14/21 0659 07/14/21 0700 - 07/15/21 0659 07/15/21 0700 - 07/16/21 0659     7599-8700 0896-0235 Total 9323-0394 3680-6183 Total 9205-5053 6524-7227 Total                    Intake    P.O.  --  -- --  400  -- 400  --  -- --    P.O. -- -- -- 400 -- 400 -- -- --    Blood  --  -- --  --  254 254  --  -- --    Volume (RELEASE PLATELET PHERESIS) -- -- -- -- 254 254 -- -- --    Total Intake -- -- -- 400 254 654 -- -- --       Output    Urine  950  2000 2950  850  -- 850  --  -- --    Number of Times Voided 3 x -- 3 x -- -- -- -- -- --    Urine Void (mL) 950 2000 2950 850 -- 850 -- -- --    Stool  --  -- --  --   -- --  --  -- --    Number of Times Stooled 0 x 2 x 2 x -- -- -- 1 x -- 1 x    Total Output 006 2000 1503 850 -- 850 -- -- --       Net I/O     -[phone] -[phone] -8075 -022 254 -196 -- -- --        Weight: 110 kg (243 lb 9.7 oz) (Simultaneous filing. User may not have seen previous data.)  GI/Nutrition:  Orders Placed This Encounter   Procedures   • Diet Order Diet: Cardiac     Standing Status:   Standing     Number of Occurrences:   1     Order Specific Question:   Diet:     Answer:   Cardiac [6]     Medications:  Current Facility-Administered Medications   Medication Last Admin   • omeprazole (PRILOSEC) capsule 40 mg 40 mg at 07/15/21 1204   • furosemide (LASIX) injection 20 mg 20 mg at 07/15/21 0615   • labetalol (NORMODYNE/TRANDATE) injection 10 mg 10 mg at 07/14/21 0115   • fludrocortisone (FLORINEF) tablet 0.1 mg 0.1 mg at 07/15/21 0614   • fentaNYL (SUBLIMAZE) injection 25-50 mcg 25 mcg at 07/14/21 0842   • mupirocin (BACTROBAN) 2 % ointment 2 % at 07/15/21 0622   • hydrocortisone (CORTEF) tablet 20 mg 20 mg at 07/15/21 0621   • hydrocortisone (CORTEF) tablet 10 mg 10 mg at 07/14/21 1648   • nystatin (MYCOSTATIN) powder Given at 07/15/21 1204   • bacitracin ointment Given at 07/15/21 0622   • ondansetron (ZOFRAN) syringe/vial injection 4 mg     • ondansetron (ZOFRAN ODT) dispertab 4 mg     • promethazine (PHENERGAN) tablet 12.5-25 mg     • promethazine (PHENERGAN) suppository 12.5-25 mg     • prochlorperazine (COMPAZINE) injection 5-10 mg     • nystatin (MYCOSTATIN) 942262 UNIT/ML suspension 500,000 Units 500,000 Units at 07/15/21 1203   • amitriptyline (ELAVIL) tablet 10 mg 10 mg at 07/15/21 0615   • calcitonin (salmon) (MIACALCIN) nasal spray 200 Units 200 Units at 07/14/21 2225   • vitamin D (cholecalciferol) tablet 2,000 Units 2,000 Units at 07/15/21 0614   • colesevelam (WELCHOL) tablet 625 mg 625 mg at 07/15/21 0842   • DULoxetine (CYMBALTA) capsule 60 mg 60 mg at 07/14/21 2225   • gabapentin (NEURONTIN)  capsule 400 mg 400 mg at 07/15/21 0614   • levothyroxine (SYNTHROID) tablet 75 mcg 75 mcg at 07/15/21 0615   • liothyronine (CYTOMEL) tablet 25 mcg 25 mcg at 07/14/21 2225   • SUMAtriptan (IMITREX) tablet 50 mg 50 mg at 07/13/21 0819   • acetaminophen (Tylenol) tablet 650 mg 650 mg at 07/11/21 0610     Medical Decision Making, by Problem:  Active Hospital Problems    Diagnosis    • *Coagulopathy (HCC) [D68.9]    • Hepatic steatosis [K76.0]    • DIC (disseminated intravascular coagulation) (HCC) [D65]    • Ischemic colitis (HCC) [K55.9]    • Lower GI bleed [K92.2]    • Epistaxis [R04.0]    • Electrolyte imbalance [E87.8]    • Thrush [B37.0]    • Incontinence [R32]    • Thrombocytopenia (HCC) [D69.6]    • Hemochromatosis [E83.119]    • Recurrent syncope [R55]    • History of pulmonary embolus (PE) [Z86.711]    • Adrenal insufficiency (Clinton's disease) (HCC) [E27.1]    • Lactic acidemia [E87.2]    • Chronic sinusitis [J32.9]      IMPRESSION:  1.  Thrombocytopenia.  2.  Chronic sinusitis.  3.  Diverticulosis.  4.  Abnormal liver function tests, which seemed as fatty liver.  5.  Hypothyroidism.  6.  History of congestive heart failure.  7.  Essential hypertension.  8.  Adrenal insufficiency.  9.  Depression.  10.  DVT with PE.  11.  Recent lower GI bleed.  12.  Syncope.     PLAN:  1.  We have monitored off Vanco and meropenem since 7/11. Doing well overall.   2. Currently no clinical signs of infection  3. ID will sign off. Please call of any issues arise.     Case and treatment reviewed with patient.    25 min on floor in the CICU with 80% face to face view and 100% in direct patient care/counseling/consulting  Today.    Dr Rdz

## 2021-07-15 NOTE — PROGRESS NOTES
This RN was notified by lab in regards to Platelet count resulting at 29. Provided update to Dr. Julia Lipscomb.   No new orders at this time. Will continue to monitor.

## 2021-07-16 LAB
ABO GROUP BLD: NORMAL
AFP-TM SERPL-MCNC: 1 NG/ML (ref 0–9)
ANISOCYTOSIS BLD QL SMEAR: ABNORMAL
BARCODED ABORH UBTYP: 6200
BARCODED ABORH UBTYP: 6200
BARCODED PRD CODE UBPRD: NORMAL
BARCODED PRD CODE UBPRD: NORMAL
BARCODED UNIT NUM UBUNT: NORMAL
BARCODED UNIT NUM UBUNT: NORMAL
BASO STIPL BLD QL SMEAR: NORMAL
BASOPHILS # BLD AUTO: 0 % (ref 0–1.8)
BASOPHILS # BLD AUTO: 0 % (ref 0–1.8)
BASOPHILS # BLD: 0 K/UL (ref 0–0.12)
BASOPHILS # BLD: 0 K/UL (ref 0–0.12)
BLD GP AB SCN SERPL QL: NORMAL
COMPONENT P 8504P: NORMAL
COMPONENT P 8504P: NORMAL
EOSINOPHIL # BLD AUTO: 0 K/UL (ref 0–0.51)
EOSINOPHIL # BLD AUTO: 0 K/UL (ref 0–0.51)
EOSINOPHIL NFR BLD: 0 % (ref 0–6.9)
EOSINOPHIL NFR BLD: 0 % (ref 0–6.9)
ERYTHROCYTE [DISTWIDTH] IN BLOOD BY AUTOMATED COUNT: 62.4 FL (ref 35.9–50)
ERYTHROCYTE [DISTWIDTH] IN BLOOD BY AUTOMATED COUNT: 62.7 FL (ref 35.9–50)
FIBRINOGEN PPP-MCNC: 153 MG/DL (ref 215–460)
FOLATE SERPL-MCNC: 8 NG/ML
GGT SERPL-CCNC: 420 U/L (ref 7–34)
HCT VFR BLD AUTO: 24.2 % (ref 37–47)
HCT VFR BLD AUTO: 24.5 % (ref 37–47)
HGB BLD-MCNC: 7.7 G/DL (ref 12–16)
HGB BLD-MCNC: 8 G/DL (ref 12–16)
HIV 1+2 AB+HIV1 P24 AG SERPL QL IA: NORMAL
HYPOCHROMIA BLD QL SMEAR: ABNORMAL
LYMPHOCYTES # BLD AUTO: 1.19 K/UL (ref 1–4.8)
LYMPHOCYTES # BLD AUTO: 1.44 K/UL (ref 1–4.8)
LYMPHOCYTES NFR BLD: 11.7 % (ref 22–41)
LYMPHOCYTES NFR BLD: 8.6 % (ref 22–41)
MACROCYTES BLD QL SMEAR: ABNORMAL
MAGNESIUM SERPL-MCNC: 1.8 MG/DL (ref 1.5–2.5)
MANUAL DIFF BLD: NORMAL
MANUAL DIFF BLD: NORMAL
MCH RBC QN AUTO: 29.3 PG (ref 27–33)
MCH RBC QN AUTO: 30.2 PG (ref 27–33)
MCHC RBC AUTO-ENTMCNC: 31.8 G/DL (ref 33.6–35)
MCHC RBC AUTO-ENTMCNC: 32.7 G/DL (ref 33.6–35)
MCV RBC AUTO: 92 FL (ref 81.4–97.8)
MCV RBC AUTO: 92.5 FL (ref 81.4–97.8)
METAMYELOCYTES NFR BLD MANUAL: 3.8 %
METAMYELOCYTES NFR BLD MANUAL: 5.4 %
MICROCYTES BLD QL SMEAR: ABNORMAL
MONOCYTES # BLD AUTO: 0.79 K/UL (ref 0–0.85)
MONOCYTES # BLD AUTO: 1.22 K/UL (ref 0–0.85)
MONOCYTES NFR BLD AUTO: 5.7 % (ref 0–13.4)
MONOCYTES NFR BLD AUTO: 9.9 % (ref 0–13.4)
MORPHOLOGY BLD-IMP: NORMAL
MORPHOLOGY BLD-IMP: NORMAL
MYELOCYTES NFR BLD MANUAL: 1.8 %
MYELOCYTES NFR BLD MANUAL: 2.9 %
NEUTROPHILS # BLD AUTO: 10.9 K/UL (ref 2–7.15)
NEUTROPHILS # BLD AUTO: 8.54 K/UL (ref 2–7.15)
NEUTROPHILS NFR BLD: 67.6 % (ref 44–72)
NEUTROPHILS NFR BLD: 78.1 % (ref 44–72)
NEUTS BAND NFR BLD MANUAL: 0.9 % (ref 0–10)
NEUTS BAND NFR BLD MANUAL: 1.8 % (ref 0–10)
NRBC # BLD AUTO: 1.45 K/UL
NRBC # BLD AUTO: 1.77 K/UL
NRBC BLD-RTO: 11.8 /100 WBC
NRBC BLD-RTO: 12.9 /100 WBC
PLATELET # BLD AUTO: 18 K/UL (ref 164–446)
PLATELET # BLD AUTO: 27 K/UL (ref 164–446)
PLATELET BLD QL SMEAR: NORMAL
PLATELET BLD QL SMEAR: NORMAL
PLATELETS.RETICULATED NFR BLD AUTO: 40.9 K/UL (ref 0.6–13.1)
PLATELETS.RETICULATED NFR BLD AUTO: 50.1 K/UL (ref 0.6–13.1)
POIKILOCYTOSIS BLD QL SMEAR: NORMAL
POLYCHROMASIA BLD QL SMEAR: NORMAL
PRODUCT TYPE UPROD: NORMAL
PRODUCT TYPE UPROD: NORMAL
PROMYELOCYTES NFR BLD MANUAL: 1.8 %
RBC # BLD AUTO: 2.63 M/UL (ref 4.2–5.4)
RBC # BLD AUTO: 2.65 M/UL (ref 4.2–5.4)
RBC BLD AUTO: PRESENT
RH BLD: NORMAL
SCHISTOCYTES BLD QL SMEAR: NORMAL
TARGETS BLD QL SMEAR: NORMAL
UNIT STATUS USTAT: NORMAL
UNIT STATUS USTAT: NORMAL
WBC # BLD AUTO: 12.3 K/UL (ref 4.8–10.8)
WBC # BLD AUTO: 13.8 K/UL (ref 4.8–10.8)

## 2021-07-16 PROCEDURE — 82746 ASSAY OF FOLIC ACID SERUM: CPT

## 2021-07-16 PROCEDURE — 700102 HCHG RX REV CODE 250 W/ 637 OVERRIDE(OP): Performed by: INTERNAL MEDICINE

## 2021-07-16 PROCEDURE — 85027 COMPLETE CBC AUTOMATED: CPT

## 2021-07-16 PROCEDURE — A9270 NON-COVERED ITEM OR SERVICE: HCPCS | Performed by: INTERNAL MEDICINE

## 2021-07-16 PROCEDURE — 700101 HCHG RX REV CODE 250: Performed by: INTERNAL MEDICINE

## 2021-07-16 PROCEDURE — 85055 RETICULATED PLATELET ASSAY: CPT

## 2021-07-16 PROCEDURE — 86664 EPSTEIN-BARR NUCLEAR ANTIGEN: CPT

## 2021-07-16 PROCEDURE — A9270 NON-COVERED ITEM OR SERVICE: HCPCS | Performed by: FAMILY MEDICINE

## 2021-07-16 PROCEDURE — 770020 HCHG ROOM/CARE - TELE (206)

## 2021-07-16 PROCEDURE — 36430 TRANSFUSION BLD/BLD COMPNT: CPT

## 2021-07-16 PROCEDURE — 82172 ASSAY OF APOLIPOPROTEIN: CPT

## 2021-07-16 PROCEDURE — G0475 HIV COMBINATION ASSAY: HCPCS

## 2021-07-16 PROCEDURE — 700102 HCHG RX REV CODE 250 W/ 637 OVERRIDE(OP): Performed by: STUDENT IN AN ORGANIZED HEALTH CARE EDUCATION/TRAINING PROGRAM

## 2021-07-16 PROCEDURE — 86694 HERPES SIMPLEX NES ANTBDY: CPT

## 2021-07-16 PROCEDURE — 82977 ASSAY OF GGT: CPT

## 2021-07-16 PROCEDURE — A9270 NON-COVERED ITEM OR SERVICE: HCPCS | Performed by: STUDENT IN AN ORGANIZED HEALTH CARE EDUCATION/TRAINING PROGRAM

## 2021-07-16 PROCEDURE — 82947 ASSAY GLUCOSE BLOOD QUANT: CPT

## 2021-07-16 PROCEDURE — 86695 HERPES SIMPLEX TYPE 1 TEST: CPT

## 2021-07-16 PROCEDURE — 86665 EPSTEIN-BARR CAPSID VCA: CPT | Mod: 91

## 2021-07-16 PROCEDURE — 84450 TRANSFERASE (AST) (SGOT): CPT

## 2021-07-16 PROCEDURE — 84478 ASSAY OF TRIGLYCERIDES: CPT

## 2021-07-16 PROCEDURE — 99233 SBSQ HOSP IP/OBS HIGH 50: CPT | Mod: GC | Performed by: HOSPITALIST

## 2021-07-16 PROCEDURE — 82247 BILIRUBIN TOTAL: CPT

## 2021-07-16 PROCEDURE — 700111 HCHG RX REV CODE 636 W/ 250 OVERRIDE (IP): Performed by: INTERNAL MEDICINE

## 2021-07-16 PROCEDURE — 86696 HERPES SIMPLEX TYPE 2 TEST: CPT

## 2021-07-16 PROCEDURE — P9034 PLATELETS, PHERESIS: HCPCS

## 2021-07-16 PROCEDURE — 82465 ASSAY BLD/SERUM CHOLESTEROL: CPT

## 2021-07-16 PROCEDURE — 700111 HCHG RX REV CODE 636 W/ 250 OVERRIDE (IP): Performed by: STUDENT IN AN ORGANIZED HEALTH CARE EDUCATION/TRAINING PROGRAM

## 2021-07-16 PROCEDURE — 83010 ASSAY OF HAPTOGLOBIN QUANT: CPT

## 2021-07-16 PROCEDURE — 83883 ASSAY NEPHELOMETRY NOT SPEC: CPT

## 2021-07-16 PROCEDURE — 83735 ASSAY OF MAGNESIUM: CPT

## 2021-07-16 PROCEDURE — 86644 CMV ANTIBODY: CPT

## 2021-07-16 PROCEDURE — 85007 BL SMEAR W/DIFF WBC COUNT: CPT

## 2021-07-16 PROCEDURE — 84460 ALANINE AMINO (ALT) (SGPT): CPT

## 2021-07-16 PROCEDURE — 86645 CMV ANTIBODY IGM: CPT

## 2021-07-16 PROCEDURE — 85384 FIBRINOGEN ACTIVITY: CPT

## 2021-07-16 PROCEDURE — 86663 EPSTEIN-BARR ANTIBODY: CPT

## 2021-07-16 PROCEDURE — 700102 HCHG RX REV CODE 250 W/ 637 OVERRIDE(OP): Performed by: FAMILY MEDICINE

## 2021-07-16 RX ORDER — MAGNESIUM SULFATE HEPTAHYDRATE 40 MG/ML
2 INJECTION, SOLUTION INTRAVENOUS ONCE
Status: COMPLETED | OUTPATIENT
Start: 2021-07-16 | End: 2021-07-16

## 2021-07-16 RX ORDER — HYDRALAZINE HYDROCHLORIDE 20 MG/ML
20 INJECTION INTRAMUSCULAR; INTRAVENOUS EVERY 6 HOURS PRN
Status: DISCONTINUED | OUTPATIENT
Start: 2021-07-16 | End: 2021-07-30 | Stop reason: HOSPADM

## 2021-07-16 RX ORDER — METHYLPREDNISOLONE SODIUM SUCCINATE 125 MG/2ML
125 INJECTION, POWDER, LYOPHILIZED, FOR SOLUTION INTRAMUSCULAR; INTRAVENOUS
Status: COMPLETED | OUTPATIENT
Start: 2021-07-16 | End: 2021-07-17

## 2021-07-16 RX ORDER — DIPHENHYDRAMINE HYDROCHLORIDE 50 MG/ML
50 INJECTION INTRAMUSCULAR; INTRAVENOUS ONCE
Status: DISCONTINUED | OUTPATIENT
Start: 2021-07-16 | End: 2021-07-16

## 2021-07-16 RX ORDER — CARVEDILOL 12.5 MG/1
12.5 TABLET ORAL 2 TIMES DAILY WITH MEALS
Status: DISCONTINUED | OUTPATIENT
Start: 2021-07-16 | End: 2021-07-21

## 2021-07-16 RX ORDER — METHYLPREDNISOLONE SODIUM SUCCINATE 125 MG/2ML
125 INJECTION, POWDER, LYOPHILIZED, FOR SOLUTION INTRAMUSCULAR; INTRAVENOUS ONCE
Status: DISCONTINUED | OUTPATIENT
Start: 2021-07-16 | End: 2021-07-16

## 2021-07-16 RX ORDER — DIPHENHYDRAMINE HYDROCHLORIDE 50 MG/ML
50 INJECTION INTRAMUSCULAR; INTRAVENOUS
Status: COMPLETED | OUTPATIENT
Start: 2021-07-16 | End: 2021-07-17

## 2021-07-16 RX ADMIN — NYSTATIN 500000 UNITS: 100000 SUSPENSION ORAL at 13:37

## 2021-07-16 RX ADMIN — HYDROCORTISONE 20 MG: 20 TABLET ORAL at 04:57

## 2021-07-16 RX ADMIN — CALCITONIN SALMON 200 UNITS: 200 SPRAY, METERED NASAL at 22:46

## 2021-07-16 RX ADMIN — FENTANYL CITRATE 25 MCG: 50 INJECTION INTRAMUSCULAR; INTRAVENOUS at 03:10

## 2021-07-16 RX ADMIN — LEVOTHYROXINE SODIUM 75 MCG: 0.07 TABLET ORAL at 04:58

## 2021-07-16 RX ADMIN — OMEPRAZOLE 40 MG: 20 CAPSULE, DELAYED RELEASE ORAL at 17:09

## 2021-07-16 RX ADMIN — FENTANYL CITRATE 50 MCG: 50 INJECTION INTRAMUSCULAR; INTRAVENOUS at 13:49

## 2021-07-16 RX ADMIN — COLESEVELAM HYDROCHLORIDE 625 MG: 625 TABLET, COATED ORAL at 14:43

## 2021-07-16 RX ADMIN — DULOXETINE HYDROCHLORIDE 60 MG: 60 CAPSULE, DELAYED RELEASE ORAL at 22:46

## 2021-07-16 RX ADMIN — NYSTATIN: 100000 POWDER TOPICAL at 17:06

## 2021-07-16 RX ADMIN — GABAPENTIN 400 MG: 400 CAPSULE ORAL at 04:58

## 2021-07-16 RX ADMIN — CARVEDILOL 12.5 MG: 12.5 TABLET, FILM COATED ORAL at 10:54

## 2021-07-16 RX ADMIN — LABETALOL HYDROCHLORIDE 10 MG: 5 INJECTION, SOLUTION INTRAVENOUS at 08:02

## 2021-07-16 RX ADMIN — AMITRIPTYLINE HYDROCHLORIDE 10 MG: 10 TABLET, FILM COATED ORAL at 04:58

## 2021-07-16 RX ADMIN — Medication 2000 UNITS: at 04:58

## 2021-07-16 RX ADMIN — LABETALOL HYDROCHLORIDE 10 MG: 5 INJECTION, SOLUTION INTRAVENOUS at 02:55

## 2021-07-16 RX ADMIN — FLUDROCORTISONE ACETATE 0.1 MG: 0.1 TABLET ORAL at 17:06

## 2021-07-16 RX ADMIN — FLUDROCORTISONE ACETATE 0.1 MG: 0.1 TABLET ORAL at 04:58

## 2021-07-16 RX ADMIN — NYSTATIN 500000 UNITS: 100000 SUSPENSION ORAL at 08:01

## 2021-07-16 RX ADMIN — FENTANYL CITRATE 50 MCG: 50 INJECTION INTRAMUSCULAR; INTRAVENOUS at 18:12

## 2021-07-16 RX ADMIN — HYDROCORTISONE 10 MG: 20 TABLET ORAL at 17:06

## 2021-07-16 RX ADMIN — CARVEDILOL 12.5 MG: 12.5 TABLET, FILM COATED ORAL at 17:05

## 2021-07-16 RX ADMIN — COLESEVELAM HYDROCHLORIDE 625 MG: 625 TABLET, COATED ORAL at 08:01

## 2021-07-16 RX ADMIN — FUROSEMIDE 20 MG: 10 INJECTION, SOLUTION INTRAVENOUS at 04:57

## 2021-07-16 RX ADMIN — FENTANYL CITRATE 25 MCG: 50 INJECTION INTRAMUSCULAR; INTRAVENOUS at 07:59

## 2021-07-16 RX ADMIN — MUPIROCIN 1 G: 20 OINTMENT TOPICAL at 04:59

## 2021-07-16 RX ADMIN — NYSTATIN: 100000 POWDER TOPICAL at 13:51

## 2021-07-16 RX ADMIN — NYSTATIN: 100000 POWDER TOPICAL at 04:58

## 2021-07-16 RX ADMIN — GABAPENTIN 400 MG: 400 CAPSULE ORAL at 17:06

## 2021-07-16 RX ADMIN — LIOTHYRONINE SODIUM 25 MCG: 25 TABLET ORAL at 22:46

## 2021-07-16 RX ADMIN — OMEPRAZOLE 40 MG: 20 CAPSULE, DELAYED RELEASE ORAL at 04:58

## 2021-07-16 RX ADMIN — NYSTATIN 500000 UNITS: 100000 SUSPENSION ORAL at 17:06

## 2021-07-16 RX ADMIN — COLESEVELAM HYDROCHLORIDE 625 MG: 625 TABLET, COATED ORAL at 22:46

## 2021-07-16 RX ADMIN — MAGNESIUM SULFATE 2 G: 2 INJECTION INTRAVENOUS at 18:00

## 2021-07-16 ASSESSMENT — ENCOUNTER SYMPTOMS
BLOOD IN STOOL: 1
CHILLS: 0
FEVER: 0
BLURRED VISION: 0
HEMOPTYSIS: 0
CONSTIPATION: 0
DOUBLE VISION: 0
COUGH: 0
SHORTNESS OF BREATH: 0
HEARTBURN: 0
ABDOMINAL PAIN: 1
PALPITATIONS: 0
DIZZINESS: 1
NAUSEA: 0
FALLS: 1
SORE THROAT: 1
DIARRHEA: 0
VOMITING: 0

## 2021-07-16 ASSESSMENT — PAIN DESCRIPTION - PAIN TYPE
TYPE: ACUTE PAIN

## 2021-07-16 ASSESSMENT — FIBROSIS 4 INDEX: FIB4 SCORE: 45.85

## 2021-07-16 NOTE — PROGRESS NOTES
Gastroenterology Progress Note     Author: Ludy Abbasi M.D.   Date & Time Created: 7/16/2021 12:32 PM    Chief Complaint:  Abdominal pain, hematochezia    Interval History:  Patient reports weakness today. Denies changes in her abdominal pain.     Review of Systems:  Review of Systems   Constitutional: Negative for chills and fever.   Respiratory: Negative for shortness of breath.    Cardiovascular: Negative for chest pain.   Gastrointestinal: Positive for abdominal pain. Negative for constipation, diarrhea, heartburn, nausea and vomiting.       Physical Exam:  Physical Exam  Constitutional:       Appearance: She is obese. She is ill-appearing.   Eyes:      General: Scleral icterus present.   Cardiovascular:      Rate and Rhythm: Normal rate and regular rhythm.   Pulmonary:      Effort: Pulmonary effort is normal. No respiratory distress.      Breath sounds: Normal breath sounds.   Abdominal:      General: Abdomen is flat. Bowel sounds are normal. There is no distension.      Palpations: Abdomen is soft.      Tenderness: There is abdominal tenderness.   Musculoskeletal:      Right lower leg: Edema present.      Left lower leg: Edema present.   Skin:     Coloration: Skin is jaundiced.   Neurological:      General: No focal deficit present.      Mental Status: She is alert and oriented to person, place, and time.      Comments: No signs of hepatic encephalopathy         Labs:          Recent Labs     07/14/21  0410 07/15/21  0227 07/16/21  0524   SODIUM 142 145  --    POTASSIUM 3.1* 3.1*  --    CHLORIDE 98 100  --    CO2 34* 35*  --    BUN 17 13  --    CREATININE 0.34* <0.17*  --    MAGNESIUM  --  1.7 1.8   CALCIUM 8.2* 8.1*  --      Recent Labs     07/14/21  0410 07/15/21  0227 07/15/21  1541 07/16/21  0822   ALTSGPT 71* 71*  --   --    ASTSGOT 171* 183*  --   --    ALKPHOSPHAT 366* 425*  --   --    TBILIRUBIN 17.8* 19.8* 22.2*  --    DBILIRUBIN  --   --  >10.0*  --    GAMMAGT  --   --   --  420*   GLUCOSE 135*  152*  --   --      Recent Labs     21  1820 07/13/21  2050 07/14/21  0410 07/14/21  0825 07/14/21  2316 07/14/21  2316 07/15/21  0227 07/15/21  15421  0524   RBC  --    < > 2.79*   < > 2.58*  --   --  2.63* 2.65*   HEMOGLOBIN  --    < > 8.3*   < > 7.7*  --   --  7.7* 8.0*   HEMATOCRIT  --    < > 25.0*   < > 23.3*  --   --  24.2* 24.5*   PLATELETCT  --    < > 20*   < > 13*   < > 29* 18* 27*   PROTHROMBTM  --   --  19.1*  --   --   --  19.2*  --   --    INR  --   --  1.66*  --   --   --  1.67*  --   --    IRON 106  --   --   --   --   --   --   --   --    FERRITIN 383.0*  --   --   --   --   --   --   --   --    TOTIRONBC 123*  --   --   --   --   --   --   --   --     < > = values in this interval not displayed.     Recent Labs     07/14/21  0410 07/14/21  0825 07/14/21  2316 07/15/21  0227 07/15/21  15421  0524   WBC 6.5   < > 10.5  --  12.3* 13.8*   NEUTSPOLYS 37.60*   < > 73.70*  --  67.60 78.10*   LYMPHOCYTES 55.10*   < > 13.60*  --  11.70* 8.60*   MONOCYTES 7.30   < > 5.50  --  9.90 5.70   EOSINOPHILS 0.00   < > 0.00  --  0.00 0.00   BASOPHILS 0.00   < > 0.00  --  0.00 0.00   ASTSGOT 171*  --   --  183*  --   --    ALTSGPT 71*  --   --  71*  --   --    ALKPHOSPHAT 366*  --   --  425*  --   --    TBILIRUBIN 17.8*  --   --  19.8* 22.2*  --     < > = values in this interval not displayed.     Hemodynamics:  Temp (24hrs), Av.4 °C (97.6 °F), Min:36.2 °C (97.1 °F), Max:37 °C (98.6 °F)  Temperature: 36.2 °C (97.1 °F)  Pulse  Av.1  Min: 54  Max: 114   Blood Pressure: 151/100     Respiratory:    Respiration: 16, Pulse Oximetry: 96 %        RUL Breath Sounds: Clear, RML Breath Sounds: Clear, RLL Breath Sounds: Diminished, TRISTAN Breath Sounds: Clear, LLL Breath Sounds: Diminished  Fluids:    Intake/Output Summary (Last 24 hours) at 2021 1254  Last data filed at 2021 0900  Gross per 24 hour   Intake --   Output 3100 ml   Net -3100 ml     Weight: 108 kg (237 lb 14  oz)  GI/Nutrition:  Orders Placed This Encounter   Procedures   • Diet Order Diet: Cardiac     Standing Status:   Standing     Number of Occurrences:   1     Order Specific Question:   Diet:     Answer:   Cardiac [6]     Medical Decision Making, by Problem:  Active Hospital Problems    Diagnosis    • *Coagulopathy (HCC) [D68.9]    • Hepatic steatosis [K76.0]    • DIC (disseminated intravascular coagulation) (HCC) [D65]    • Ischemic colitis (HCC) [K55.9]    • Lower GI bleed [K92.2]    • Epistaxis [R04.0]    • Electrolyte imbalance [E87.8]    • Thrush [B37.0]    • Incontinence [R32]    • Thrombocytopenia (HCC) [D69.6]    • Hemochromatosis [E83.119]    • Recurrent syncope [R55]    • History of pulmonary embolus (PE) [Z86.711]    • Adrenal insufficiency (Valentín's disease) (HCC) [E27.1]    • Lactic acidemia [E87.2]    • Chronic sinusitis [J32.9]        Plan:  Elevated LFTs, hematochezia - question of cirrhosis. LFTS and platelets have fluctuated from normal to abnormal over the last few months. US shows normal directional flow of blood through the portal vein. Elastography is not consistent with cirrhosis.  Serology negative for chronic viral hepatitis or autoimmune liver disease.  Since she has been on extended course of antibiotics prior to admission, drug-induced liver injury is also in the differential diagnosis.  It is reasonable to get a pharmacy consult regarding this. A definitive answer would require a liver biopsy but she is high risk for bleeding and therefore will be deferred until thrombocytopenia is adequately corrected after further evaluation.. She may have a component of ETOH hepatitis given the amount of ETOH she was drinking prior to admission. Continue to monitor liver and f/u additional lab tests. Continue supportive care.     Quality-Core Measures

## 2021-07-16 NOTE — PROGRESS NOTES
Lab called with a critical platelet of 18.  MD paged.  Orders placed for one unit of platelets to be transfused.

## 2021-07-16 NOTE — CARE PLAN
The patient is Watcher - Medium risk of patient condition declining or worsening    Shift Goals  Clinical Goals: improve labs  Patient Goals: rest, swelling to improve      Problem: Knowledge Deficit - Standard  Goal: Patient and family/care givers will demonstrate understanding of plan of care, disease process/condition, diagnostic tests and medications  Outcome: Progressing     Problem: Fall Risk  Goal: Patient will remain free from falls  Outcome: Progressing     Problem: Skin Integrity  Goal: Skin integrity is maintained or improved  Outcome: Progressing     Problem: Pain - Standard  Goal: Alleviation of pain or a reduction in pain to the patient’s comfort goal  Patient receiving both IV and oral pain medication to address abdominal pain  Outcome: Progressing

## 2021-07-16 NOTE — PROGRESS NOTES
4 Eyes Skin Assessment Completed by AILYN Sexton and AILYN Fernándze.    Head Jaundice  Ears Blanching  Nose Blanching  Mouth Bleeding  Neck Blanching, Bruising and Jaundice  Breast/Chest Bruising, Jaundice and Edema  Shoulder Blades Blanching, bruising   Spine Blanching, bruising  (R) Arm/Elbow/Hand Blanching, Bruising, Abrasion, Swelling, Weeping and Edema  (L) Arm/Elbow/Hand Blanching, Bruising, Abrasion, Scab, Weeping and Edema  Abdomen Blanching, Scab and Bruising  Groin Redness, Blanching, Excoriation, Rash and Swelling  Scrotum/Coccyx/Buttocks Redness, Blanching, Excoriation and Moisture Fissure  (R) Leg Blanching, Bruising, Swelling, Weeping and Edema  (L) Leg Blanching, Bruising, Swelling, Weeping and Edema  (R) Heel/Foot/Toe Redness, Blanching, Boggy, Swelling and Edema  (L) Heel/Foot/Toe Redness, Blanching, Boggy, Bruising, Swelling, Scab and Edema          Devices In Places Tele Box, Blood Pressure Cuff, Central Line and Nasal Cannula      Interventions In Place Gray Ear Foams, Heel Mepilex, Waffle Overlay, Pillows, Q2 Turns, Barrier Cream, Dri-Jack Pads, Heels Loaded W/Pillows and Pressure Redistribution Mattress    Possible Skin Injury No    Pictures Uploaded Into Epic N/A  Wound Consult Placed N/A, wound following already  RN Wound Prevention Protocol Ordered Yes

## 2021-07-16 NOTE — CARE PLAN
Problem: Knowledge Deficit - Standard  Goal: Patient and family/care givers will demonstrate understanding of plan of care, disease process/condition, diagnostic tests and medications  Outcome: Progressing  Note: Discussed POC and medications with patient.  Patient verbalized understanding.

## 2021-07-16 NOTE — PROGRESS NOTES
Assumed care of patient at bedside report from NOC RN. Updated on POC. Patient currently A & O x 4; on room air; up max assist; with complaints of acute abdominal pain. Medicated per MAR. Call light within reach. Whiteboard updated. Fall precautions in place. Bed locked and in lowest position. All questions answered. No other needs indicated at this time.

## 2021-07-17 ENCOUNTER — APPOINTMENT (OUTPATIENT)
Dept: RADIOLOGY | Facility: MEDICAL CENTER | Age: 56
DRG: 441 | End: 2021-07-17
Attending: STUDENT IN AN ORGANIZED HEALTH CARE EDUCATION/TRAINING PROGRAM
Payer: MEDICARE

## 2021-07-17 LAB
ALBUMIN SERPL BCP-MCNC: 2.8 G/DL (ref 3.2–4.9)
ALBUMIN/GLOB SERPL: 2.5 G/DL
ALP SERPL-CCNC: 478 U/L (ref 30–99)
ALT SERPL-CCNC: 75 U/L (ref 2–50)
ANION GAP SERPL CALC-SCNC: 9 MMOL/L (ref 7–16)
ANISOCYTOSIS BLD QL SMEAR: ABNORMAL
ANISOCYTOSIS BLD QL SMEAR: ABNORMAL
AST SERPL-CCNC: 171 U/L (ref 12–45)
BASOPHILS # BLD AUTO: 0.9 % (ref 0–1.8)
BASOPHILS # BLD AUTO: 1 % (ref 0–1.8)
BASOPHILS # BLD: 0.09 K/UL (ref 0–0.12)
BASOPHILS # BLD: 0.12 K/UL (ref 0–0.12)
BILIRUB SERPL-MCNC: 18.2 MG/DL (ref 0.1–1.5)
BUN SERPL-MCNC: 10 MG/DL (ref 8–22)
CALCIUM SERPL-MCNC: 8 MG/DL (ref 8.5–10.5)
CHLORIDE SERPL-SCNC: 95 MMOL/L (ref 96–112)
CMV IGG SERPL IA-ACNC: <0.2 U/ML
CMV IGM SERPL IA-ACNC: <8 AU/ML
CO2 SERPL-SCNC: 36 MMOL/L (ref 20–33)
CREAT SERPL-MCNC: <0.17 MG/DL (ref 0.5–1.4)
EOSINOPHIL # BLD AUTO: 0 K/UL (ref 0–0.51)
EOSINOPHIL # BLD AUTO: 0 K/UL (ref 0–0.51)
EOSINOPHIL NFR BLD: 0 % (ref 0–6.9)
EOSINOPHIL NFR BLD: 0 % (ref 0–6.9)
ERYTHROCYTE [DISTWIDTH] IN BLOOD BY AUTOMATED COUNT: 62.8 FL (ref 35.9–50)
ERYTHROCYTE [DISTWIDTH] IN BLOOD BY AUTOMATED COUNT: 63.1 FL (ref 35.9–50)
GLOBULIN SER CALC-MCNC: 1.1 G/DL (ref 1.9–3.5)
GLUCOSE SERPL-MCNC: 129 MG/DL (ref 65–99)
HCT VFR BLD AUTO: 22.7 % (ref 37–47)
HCT VFR BLD AUTO: 24.9 % (ref 37–47)
HGB BLD-MCNC: 7.2 G/DL (ref 12–16)
HGB BLD-MCNC: 8 G/DL (ref 12–16)
HYPOCHROMIA BLD QL SMEAR: ABNORMAL
HYPOCHROMIA BLD QL SMEAR: ABNORMAL
LYMPHOCYTES # BLD AUTO: 0.43 K/UL (ref 1–4.8)
LYMPHOCYTES # BLD AUTO: 1.06 K/UL (ref 1–4.8)
LYMPHOCYTES NFR BLD: 4.5 % (ref 22–41)
LYMPHOCYTES NFR BLD: 9.1 % (ref 22–41)
MACROCYTES BLD QL SMEAR: ABNORMAL
MACROCYTES BLD QL SMEAR: ABNORMAL
MAGNESIUM SERPL-MCNC: 2.1 MG/DL (ref 1.5–2.5)
MANUAL DIFF BLD: NORMAL
MANUAL DIFF BLD: NORMAL
MCH RBC QN AUTO: 29.5 PG (ref 27–33)
MCH RBC QN AUTO: 30.1 PG (ref 27–33)
MCHC RBC AUTO-ENTMCNC: 31.7 G/DL (ref 33.6–35)
MCHC RBC AUTO-ENTMCNC: 32.1 G/DL (ref 33.6–35)
MCV RBC AUTO: 93 FL (ref 81.4–97.8)
MCV RBC AUTO: 93.6 FL (ref 81.4–97.8)
METAMYELOCYTES NFR BLD MANUAL: 1 %
METAMYELOCYTES NFR BLD MANUAL: 2.7 %
MICROCYTES BLD QL SMEAR: ABNORMAL
MICROCYTES BLD QL SMEAR: ABNORMAL
MONOCYTES # BLD AUTO: 0.6 K/UL (ref 0–0.85)
MONOCYTES # BLD AUTO: 1.66 K/UL (ref 0–0.85)
MONOCYTES NFR BLD AUTO: 14.2 % (ref 0–13.4)
MONOCYTES NFR BLD AUTO: 6.2 % (ref 0–13.4)
MORPHOLOGY BLD-IMP: NORMAL
MORPHOLOGY BLD-IMP: NORMAL
MYELOCYTES NFR BLD MANUAL: 3 %
NEUTROPHILS # BLD AUTO: 7.97 K/UL (ref 2–7.15)
NEUTROPHILS # BLD AUTO: 8.39 K/UL (ref 2–7.15)
NEUTROPHILS NFR BLD: 67.7 % (ref 44–72)
NEUTROPHILS NFR BLD: 75.9 % (ref 44–72)
NEUTS BAND NFR BLD MANUAL: 4 % (ref 0–10)
NEUTS BAND NFR BLD MANUAL: 7.1 % (ref 0–10)
NEUTS HYPERSEG BLD QL SMEAR: NORMAL
NRBC # BLD AUTO: 0.72 K/UL
NRBC # BLD AUTO: 1.06 K/UL
NRBC BLD-RTO: 7.5 /100 WBC
NRBC BLD-RTO: 9.1 /100 WBC
PLATELET # BLD AUTO: 24 K/UL (ref 164–446)
PLATELET # BLD AUTO: 35 K/UL (ref 164–446)
PLATELET BLD QL SMEAR: NORMAL
PLATELET BLD QL SMEAR: NORMAL
PLATELETS.RETICULATED NFR BLD AUTO: 43.3 K/UL (ref 0.6–13.1)
PLATELETS.RETICULATED NFR BLD AUTO: 47 K/UL (ref 0.6–13.1)
POIKILOCYTOSIS BLD QL SMEAR: NORMAL
POIKILOCYTOSIS BLD QL SMEAR: NORMAL
POLYCHROMASIA BLD QL SMEAR: NORMAL
POLYCHROMASIA BLD QL SMEAR: NORMAL
POTASSIUM SERPL-SCNC: 3.2 MMOL/L (ref 3.6–5.5)
PROMYELOCYTES NFR BLD MANUAL: 2.7 %
PROT SERPL-MCNC: 3.9 G/DL (ref 6–8.2)
RBC # BLD AUTO: 2.44 M/UL (ref 4.2–5.4)
RBC # BLD AUTO: 2.66 M/UL (ref 4.2–5.4)
RBC BLD AUTO: PRESENT
RBC BLD AUTO: PRESENT
SCHISTOCYTES BLD QL SMEAR: NORMAL
SODIUM SERPL-SCNC: 140 MMOL/L (ref 135–145)
TARGETS BLD QL SMEAR: NORMAL
TARGETS BLD QL SMEAR: NORMAL
WBC # BLD AUTO: 11.7 K/UL (ref 4.8–10.8)
WBC # BLD AUTO: 9.6 K/UL (ref 4.8–10.8)

## 2021-07-17 PROCEDURE — 85055 RETICULATED PLATELET ASSAY: CPT | Mod: 91

## 2021-07-17 PROCEDURE — 83735 ASSAY OF MAGNESIUM: CPT

## 2021-07-17 PROCEDURE — 700111 HCHG RX REV CODE 636 W/ 250 OVERRIDE (IP): Performed by: STUDENT IN AN ORGANIZED HEALTH CARE EDUCATION/TRAINING PROGRAM

## 2021-07-17 PROCEDURE — 700101 HCHG RX REV CODE 250: Performed by: INTERNAL MEDICINE

## 2021-07-17 PROCEDURE — A9270 NON-COVERED ITEM OR SERVICE: HCPCS | Performed by: STUDENT IN AN ORGANIZED HEALTH CARE EDUCATION/TRAINING PROGRAM

## 2021-07-17 PROCEDURE — 770020 HCHG ROOM/CARE - TELE (206)

## 2021-07-17 PROCEDURE — A9270 NON-COVERED ITEM OR SERVICE: HCPCS | Performed by: FAMILY MEDICINE

## 2021-07-17 PROCEDURE — 700102 HCHG RX REV CODE 250 W/ 637 OVERRIDE(OP): Performed by: INTERNAL MEDICINE

## 2021-07-17 PROCEDURE — 74181 MRI ABDOMEN W/O CONTRAST: CPT | Mod: MH

## 2021-07-17 PROCEDURE — 700102 HCHG RX REV CODE 250 W/ 637 OVERRIDE(OP): Performed by: STUDENT IN AN ORGANIZED HEALTH CARE EDUCATION/TRAINING PROGRAM

## 2021-07-17 PROCEDURE — A9270 NON-COVERED ITEM OR SERVICE: HCPCS | Performed by: INTERNAL MEDICINE

## 2021-07-17 PROCEDURE — 80053 COMPREHEN METABOLIC PANEL: CPT

## 2021-07-17 PROCEDURE — 85007 BL SMEAR W/DIFF WBC COUNT: CPT | Mod: 91

## 2021-07-17 PROCEDURE — 99233 SBSQ HOSP IP/OBS HIGH 50: CPT | Mod: GC | Performed by: HOSPITALIST

## 2021-07-17 PROCEDURE — 700102 HCHG RX REV CODE 250 W/ 637 OVERRIDE(OP): Performed by: FAMILY MEDICINE

## 2021-07-17 PROCEDURE — 85027 COMPLETE CBC AUTOMATED: CPT | Mod: 91

## 2021-07-17 RX ADMIN — FLUDROCORTISONE ACETATE 0.1 MG: 0.1 TABLET ORAL at 18:00

## 2021-07-17 RX ADMIN — COLESEVELAM HYDROCHLORIDE 625 MG: 625 TABLET, COATED ORAL at 21:40

## 2021-07-17 RX ADMIN — HYDROCORTISONE 20 MG: 20 TABLET ORAL at 06:44

## 2021-07-17 RX ADMIN — NYSTATIN: 100000 POWDER TOPICAL at 06:45

## 2021-07-17 RX ADMIN — CALCITONIN SALMON 200 UNITS: 200 SPRAY, METERED NASAL at 20:51

## 2021-07-17 RX ADMIN — LIOTHYRONINE SODIUM 25 MCG: 25 TABLET ORAL at 21:40

## 2021-07-17 RX ADMIN — METHYLPREDNISOLONE SODIUM SUCCINATE 125 MG: 125 INJECTION, POWDER, FOR SOLUTION INTRAMUSCULAR; INTRAVENOUS at 09:49

## 2021-07-17 RX ADMIN — DULOXETINE HYDROCHLORIDE 60 MG: 60 CAPSULE, DELAYED RELEASE ORAL at 20:51

## 2021-07-17 RX ADMIN — OMEPRAZOLE 40 MG: 20 CAPSULE, DELAYED RELEASE ORAL at 18:00

## 2021-07-17 RX ADMIN — CARVEDILOL 12.5 MG: 12.5 TABLET, FILM COATED ORAL at 06:45

## 2021-07-17 RX ADMIN — MUPIROCIN 1 APPLICATION: 20 OINTMENT TOPICAL at 09:08

## 2021-07-17 RX ADMIN — HYDROCORTISONE 10 MG: 20 TABLET ORAL at 20:51

## 2021-07-17 RX ADMIN — FLUDROCORTISONE ACETATE 0.1 MG: 0.1 TABLET ORAL at 06:45

## 2021-07-17 RX ADMIN — Medication 2000 UNITS: at 06:44

## 2021-07-17 RX ADMIN — GABAPENTIN 400 MG: 400 CAPSULE ORAL at 18:00

## 2021-07-17 RX ADMIN — AMITRIPTYLINE HYDROCHLORIDE 10 MG: 10 TABLET, FILM COATED ORAL at 06:45

## 2021-07-17 RX ADMIN — OMEPRAZOLE 40 MG: 20 CAPSULE, DELAYED RELEASE ORAL at 06:44

## 2021-07-17 RX ADMIN — DIPHENHYDRAMINE HYDROCHLORIDE 50 MG: 50 INJECTION INTRAMUSCULAR; INTRAVENOUS at 09:49

## 2021-07-17 RX ADMIN — COLESEVELAM HYDROCHLORIDE 625 MG: 625 TABLET, COATED ORAL at 15:25

## 2021-07-17 RX ADMIN — FUROSEMIDE 20 MG: 10 INJECTION, SOLUTION INTRAVENOUS at 06:45

## 2021-07-17 RX ADMIN — COLESEVELAM HYDROCHLORIDE 625 MG: 625 TABLET, COATED ORAL at 09:08

## 2021-07-17 RX ADMIN — LEVOTHYROXINE SODIUM 75 MCG: 0.07 TABLET ORAL at 06:45

## 2021-07-17 RX ADMIN — CARVEDILOL 12.5 MG: 12.5 TABLET, FILM COATED ORAL at 18:00

## 2021-07-17 RX ADMIN — GABAPENTIN 400 MG: 400 CAPSULE ORAL at 06:45

## 2021-07-17 ASSESSMENT — COGNITIVE AND FUNCTIONAL STATUS - GENERAL
CLIMB 3 TO 5 STEPS WITH RAILING: TOTAL
SUGGESTED CMS G CODE MODIFIER DAILY ACTIVITY: CK
EATING MEALS: A LITTLE
DRESSING REGULAR UPPER BODY CLOTHING: A LOT
PERSONAL GROOMING: A LITTLE
TOILETING: A LOT
WALKING IN HOSPITAL ROOM: TOTAL
MOVING FROM LYING ON BACK TO SITTING ON SIDE OF FLAT BED: UNABLE
MOVING TO AND FROM BED TO CHAIR: UNABLE
STANDING UP FROM CHAIR USING ARMS: A LOT
DAILY ACTIVITIY SCORE: 14
MOBILITY SCORE: 7
TURNING FROM BACK TO SIDE WHILE IN FLAT BAD: UNABLE
DRESSING REGULAR LOWER BODY CLOTHING: A LOT
SUGGESTED CMS G CODE MODIFIER MOBILITY: CM
HELP NEEDED FOR BATHING: A LOT

## 2021-07-17 ASSESSMENT — ENCOUNTER SYMPTOMS
DOUBLE VISION: 0
NAUSEA: 0
CHILLS: 0
BLURRED VISION: 0
HEMOPTYSIS: 0
SHORTNESS OF BREATH: 0
SORE THROAT: 1
FALLS: 1
PALPITATIONS: 0
VOMITING: 0
DIZZINESS: 1
COUGH: 0
FEVER: 0
ABDOMINAL PAIN: 1
HEARTBURN: 0
BLOOD IN STOOL: 1
DIARRHEA: 0
CONSTIPATION: 0

## 2021-07-17 ASSESSMENT — FIBROSIS 4 INDEX: FIB4 SCORE: 46.9
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
## 2021-07-17 NOTE — PROGRESS NOTES
Gastroenterology Progress Note     Author: Ludy Abbasi M.D.   Date & Time Created: 7/17/2021 8:39 AM    Chief Complaint:  Abdominal pain, hematochezia    Interval History:  No new symptoms.  No rectal bleeding.  Denies pruritus.    Review of Systems:  Review of Systems   Constitutional: Negative for chills and fever.   Respiratory: Negative for shortness of breath.    Cardiovascular: Negative for chest pain.   Gastrointestinal: Positive for abdominal pain. Negative for constipation, diarrhea, heartburn, nausea and vomiting.       Physical Exam:  Physical Exam  Constitutional:       Appearance: She is obese. She is ill-appearing.   Eyes:      General: Scleral icterus present.   Cardiovascular:      Rate and Rhythm: Normal rate and regular rhythm.   Pulmonary:      Effort: Pulmonary effort is normal. No respiratory distress.      Breath sounds: Normal breath sounds.   Abdominal:      General: Abdomen is flat. Bowel sounds are normal. There is no distension.      Palpations: Abdomen is soft.      Tenderness: There is abdominal tenderness.   Musculoskeletal:      Right lower leg: Edema present.      Left lower leg: Edema present.   Skin:     Coloration: Skin is jaundiced.   Neurological:      General: No focal deficit present.      Mental Status: She is alert and oriented to person, place, and time.      Comments: No signs of hepatic encephalopathy         Labs:          Recent Labs     07/15/21  0227 07/16/21  0524 07/17/21  0234   SODIUM 145  --  140   POTASSIUM 3.1*  --  3.2*   CHLORIDE 100  --  95*   CO2 35*  --  36*   BUN 13  --  10   CREATININE <0.17*  --  <0.17*   MAGNESIUM 1.7 1.8 2.1   CALCIUM 8.1*  --  8.0*     Recent Labs     07/15/21  0227 07/15/21  1541 07/16/21  0822 07/17/21  0234   ALTSGPT 71*  --   --  75*   ASTSGOT 183*  --   --  171*   ALKPHOSPHAT 425*  --   --  478*   TBILIRUBIN 19.8* 22.2*  --  18.2*   DBILIRUBIN  --  >10.0*  --   --    GAMMAGT  --   --  420*  --    GLUCOSE 152*  --   --  129*      Recent Labs     07/15/21  0227 07/15/21  0227 07/15/21  1541 21  0234   RBC  --    < > 2.63* 2.65* 2.44*   HEMOGLOBIN  --    < > 7.7* 8.0* 7.2*   HEMATOCRIT  --    < > 24.2* 24.5* 22.7*   PLATELETCT 29*   < > 18* 27* 24*   PROTHROMBTM 19.2*  --   --   --   --    INR 1.67*  --   --   --   --     < > = values in this interval not displayed.     Recent Labs     21  2316 07/15/21  0227 07/15/21  1541 07/16/21  0524 07/17/21  0234   WBC   < >  --  12.3* 13.8* 11.7*   NEUTSPOLYS   < >  --  67.60 78.10* 67.70   LYMPHOCYTES   < >  --  11.70* 8.60* 9.10*   MONOCYTES   < >  --  9.90 5.70 14.20*   EOSINOPHILS   < >  --  0.00 0.00 0.00   BASOPHILS   < >  --  0.00 0.00 1.00   ASTSGOT  --  183*  --   --  171*   ALTSGPT  --  71*  --   --  75*   ALKPHOSPHAT  --  425*  --   --  478*   TBILIRUBIN  --  19.8* 22.2*  --  18.2*    < > = values in this interval not displayed.     Hemodynamics:  Temp (24hrs), Av.2 °C (97.2 °F), Min:36.2 °C (97.1 °F), Max:36.3 °C (97.3 °F)  Temperature: 36.3 °C (97.3 °F)  Pulse  Av.8  Min: 54  Max: 114   Blood Pressure: 148/91     Respiratory:    Respiration: 17, Pulse Oximetry: 93 %        RUL Breath Sounds: Clear, RML Breath Sounds: Clear, RLL Breath Sounds: Diminished, TRISTAN Breath Sounds: Clear, LLL Breath Sounds: Diminished  Fluids:    Intake/Output Summary (Last 24 hours) at 2021 1254  Last data filed at 2021 0900  Gross per 24 hour   Intake --   Output 3100 ml   Net -3100 ml     Weight: 107 kg (234 lb 12.6 oz)  GI/Nutrition:  Orders Placed This Encounter   Procedures   • Diet Order Diet: Cardiac     Standing Status:   Standing     Number of Occurrences:   1     Order Specific Question:   Diet:     Answer:   Cardiac [6]     Medical Decision Making, by Problem:  Active Hospital Problems    Diagnosis    • *Coagulopathy (HCC) [D68.9]    • Hepatic steatosis [K76.0]    • DIC (disseminated intravascular coagulation) (HCC) [D65]    • Ischemic colitis (HCC)  [K55.9]    • Lower GI bleed [K92.2]    • Epistaxis [R04.0]    • Electrolyte imbalance [E87.8]    • Thrush [B37.0]    • Incontinence [R32]    • Thrombocytopenia (HCC) [D69.6]    • Hemochromatosis [E83.119]    • Recurrent syncope [R55]    • History of pulmonary embolus (PE) [Z86.711]    • Adrenal insufficiency (Bleckley's disease) (HCC) [E27.1]    • Lactic acidemia [E87.2]    • Chronic sinusitis [J32.9]        Plan:  56-year-old female with cholestatic hepatitis and jaundice for several months.US shows normal directional flow of blood through the portal vein. Elastography is not consistent with cirrhosis.  Serology negative for chronic viral hepatitis or autoimmune liver disease.  Since she has been on extended courses of antibiotics prior to admission, drug-induced liver injury is also in the differential diagnosis.  It is reasonable to get a pharmacy consult regarding this. A definitive answer would require a liver biopsy but she is high risk for bleeding and therefore will be deferred until thrombocytopenia is adequately corrected after further evaluation.. She may have a component of ETOH hepatitis given the amount of ETOH she was drinking prior to admission. Continue to monitor liver and f/u additional lab tests. Continue supportive care.     1.  Continue hematology work-up regarding DIC and severe thrombocytopenia  2.  Transjugular liver biopsy when safe to proceed  3.  Pharmacy consultation regarding possibility of DILI   4.  Continue supportive care  5.  Monitor for hepatic encephalopathy.    Quality-Core Measures

## 2021-07-17 NOTE — CARE PLAN
Problem: Knowledge Deficit - Standard  Goal: Patient and family/care givers will demonstrate understanding of plan of care, disease process/condition, diagnostic tests and medications  Outcome: Progressing     Problem: Fall Risk  Goal: Patient will remain free from falls  Outcome: Progressing   The patient is Stable - Low risk of patient condition declining or worsening    Shift Goals  Clinical Goals: improve labs  Patient Goals: rest, swelling to improve

## 2021-07-17 NOTE — PROGRESS NOTES
Report received by day shift RN. Pt awake alert and oriented x 4 with no c/o pain. Discussed POC. Bed locked in low position with fall precautions in place and call light in reach.
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
## 2021-07-17 NOTE — PROGRESS NOTES
Assumed care of patient. Bedside report, received from Alber ROBERTSON. Updated POC, call light within reach, and fall precautions in place. Bed locked and and in lowest position. Patient instructed to call for assistance before getting out of bed. All questions answered, no further needs at this time.

## 2021-07-17 NOTE — CARE PLAN
The patient is Watcher - Medium risk of patient condition declining or worsening    Shift Goals  Clinical Goals: Monitor labs/vitals, supportive care  Patient Goals: Rest, comfort    Progress made toward(s) clinical / shift goals:      Problem: Pain - Standard  Goal: Alleviation of pain or a reduction in pain to the patient’s comfort goal  Outcome: Progressing     Problem: Knowledge Deficit - Standard  Goal: Patient and family/care givers will demonstrate understanding of plan of care, disease process/condition, diagnostic tests and medications  Outcome: Progressing     Problem: Fall Risk  Goal: Patient will remain free from falls  Outcome: Progressing     Problem: Skin Integrity  Goal: Skin integrity is maintained or improved  Outcome: Progressing

## 2021-07-17 NOTE — PROGRESS NOTES
Monitor Summary  Rhythm: SR with 1st degree  Rate: 62-92  Ectopy: PVC, couplet  0.22 / 0.06 / 0.36

## 2021-07-17 NOTE — DISCHARGE PLANNING
Current documentation does not support therapy tolerance for IRF level of care and Plantlet count is a barrier need to be greater than 50 sustained. TCC no longer following. In the event of interval improvement prior to being medically cleared please consult PMR.

## 2021-07-18 LAB
ALBUMIN SERPL BCP-MCNC: 2.7 G/DL (ref 3.2–4.9)
ALBUMIN/GLOB SERPL: 1.8 G/DL
ALP SERPL-CCNC: 454 U/L (ref 30–99)
ALT SERPL-CCNC: 99 U/L (ref 2–50)
ANION GAP SERPL CALC-SCNC: 11 MMOL/L (ref 7–16)
ANISOCYTOSIS BLD QL SMEAR: ABNORMAL
AST SERPL-CCNC: 207 U/L (ref 12–45)
BASOPHILS # BLD AUTO: 0 % (ref 0–1.8)
BASOPHILS # BLD: 0 K/UL (ref 0–0.12)
BILIRUB SERPL-MCNC: 13.1 MG/DL (ref 0.1–1.5)
BUN SERPL-MCNC: 10 MG/DL (ref 8–22)
CALCIUM SERPL-MCNC: 7.9 MG/DL (ref 8.5–10.5)
CHLORIDE SERPL-SCNC: 93 MMOL/L (ref 96–112)
CO2 SERPL-SCNC: 35 MMOL/L (ref 20–33)
CREAT SERPL-MCNC: 0.24 MG/DL (ref 0.5–1.4)
EBV EA-D IGG SER-ACNC: <5 U/ML (ref 0–10.9)
EBV NA IGG SER IA-ACNC: 335 U/ML (ref 0–21.9)
EBV VCA IGG SER IA-ACNC: 138 U/ML (ref 0–21.9)
EBV VCA IGM SER IA-ACNC: <10 U/ML (ref 0–43.9)
EOSINOPHIL # BLD AUTO: 0 K/UL (ref 0–0.51)
EOSINOPHIL NFR BLD: 0 % (ref 0–6.9)
ERYTHROCYTE [DISTWIDTH] IN BLOOD BY AUTOMATED COUNT: 64.7 FL (ref 35.9–50)
GLOBULIN SER CALC-MCNC: 1.5 G/DL (ref 1.9–3.5)
GLUCOSE SERPL-MCNC: 175 MG/DL (ref 65–99)
HAPTOGLOB SERPL-MCNC: <10 MG/DL (ref 30–200)
HCT VFR BLD AUTO: 25.9 % (ref 37–47)
HGB BLD-MCNC: 8.3 G/DL (ref 12–16)
HSV1 GG IGG SER-ACNC: 25.8 IV
HSV1+2 IGG SER IA-ACNC: >22.4 IV
HSV2 GG IGG SER-ACNC: 0.05 IV
LYMPHOCYTES # BLD AUTO: 0.86 K/UL (ref 1–4.8)
LYMPHOCYTES NFR BLD: 8.4 % (ref 22–41)
MACROCYTES BLD QL SMEAR: ABNORMAL
MANUAL DIFF BLD: NORMAL
MCH RBC QN AUTO: 30.4 PG (ref 27–33)
MCHC RBC AUTO-ENTMCNC: 32 G/DL (ref 33.6–35)
MCV RBC AUTO: 94.9 FL (ref 81.4–97.8)
METAMYELOCYTES NFR BLD MANUAL: 1.9 %
MONOCYTES # BLD AUTO: 0.77 K/UL (ref 0–0.85)
MONOCYTES NFR BLD AUTO: 7.5 % (ref 0–13.4)
MORPHOLOGY BLD-IMP: NORMAL
MYELOCYTES NFR BLD MANUAL: 0.9 %
NEUTROPHILS # BLD AUTO: 8.29 K/UL (ref 2–7.15)
NEUTROPHILS NFR BLD: 72.9 % (ref 44–72)
NEUTS BAND NFR BLD MANUAL: 8.4 % (ref 0–10)
NRBC # BLD AUTO: 0.73 K/UL
NRBC BLD-RTO: 7.2 /100 WBC
PLATELET # BLD AUTO: 40 K/UL (ref 164–446)
PLATELET BLD QL SMEAR: NORMAL
PLATELETS.RETICULATED NFR BLD AUTO: 41.9 K/UL (ref 0.6–13.1)
POIKILOCYTOSIS BLD QL SMEAR: NORMAL
POLYCHROMASIA BLD QL SMEAR: NORMAL
POTASSIUM SERPL-SCNC: 2.4 MMOL/L (ref 3.6–5.5)
PROT SERPL-MCNC: 4.2 G/DL (ref 6–8.2)
RBC # BLD AUTO: 2.73 M/UL (ref 4.2–5.4)
RBC BLD AUTO: PRESENT
SODIUM SERPL-SCNC: 139 MMOL/L (ref 135–145)
TARGETS BLD QL SMEAR: NORMAL
WBC # BLD AUTO: 10.2 K/UL (ref 4.8–10.8)

## 2021-07-18 PROCEDURE — 700102 HCHG RX REV CODE 250 W/ 637 OVERRIDE(OP): Performed by: INTERNAL MEDICINE

## 2021-07-18 PROCEDURE — 700102 HCHG RX REV CODE 250 W/ 637 OVERRIDE(OP)

## 2021-07-18 PROCEDURE — A9270 NON-COVERED ITEM OR SERVICE: HCPCS | Performed by: FAMILY MEDICINE

## 2021-07-18 PROCEDURE — 770020 HCHG ROOM/CARE - TELE (206)

## 2021-07-18 PROCEDURE — 85007 BL SMEAR W/DIFF WBC COUNT: CPT

## 2021-07-18 PROCEDURE — A9270 NON-COVERED ITEM OR SERVICE: HCPCS | Performed by: INTERNAL MEDICINE

## 2021-07-18 PROCEDURE — A9270 NON-COVERED ITEM OR SERVICE: HCPCS

## 2021-07-18 PROCEDURE — 700111 HCHG RX REV CODE 636 W/ 250 OVERRIDE (IP)

## 2021-07-18 PROCEDURE — 80053 COMPREHEN METABOLIC PANEL: CPT

## 2021-07-18 PROCEDURE — 85055 RETICULATED PLATELET ASSAY: CPT

## 2021-07-18 PROCEDURE — 99233 SBSQ HOSP IP/OBS HIGH 50: CPT | Mod: GC | Performed by: HOSPITALIST

## 2021-07-18 PROCEDURE — A9270 NON-COVERED ITEM OR SERVICE: HCPCS | Performed by: STUDENT IN AN ORGANIZED HEALTH CARE EDUCATION/TRAINING PROGRAM

## 2021-07-18 PROCEDURE — 700102 HCHG RX REV CODE 250 W/ 637 OVERRIDE(OP): Performed by: FAMILY MEDICINE

## 2021-07-18 PROCEDURE — 700111 HCHG RX REV CODE 636 W/ 250 OVERRIDE (IP): Performed by: INTERNAL MEDICINE

## 2021-07-18 PROCEDURE — 700111 HCHG RX REV CODE 636 W/ 250 OVERRIDE (IP): Performed by: STUDENT IN AN ORGANIZED HEALTH CARE EDUCATION/TRAINING PROGRAM

## 2021-07-18 PROCEDURE — 700102 HCHG RX REV CODE 250 W/ 637 OVERRIDE(OP): Performed by: STUDENT IN AN ORGANIZED HEALTH CARE EDUCATION/TRAINING PROGRAM

## 2021-07-18 PROCEDURE — 82103 ALPHA-1-ANTITRYPSIN TOTAL: CPT

## 2021-07-18 PROCEDURE — 85027 COMPLETE CBC AUTOMATED: CPT

## 2021-07-18 RX ORDER — POTASSIUM CHLORIDE 7.45 MG/ML
10 INJECTION INTRAVENOUS
Status: COMPLETED | OUTPATIENT
Start: 2021-07-18 | End: 2021-07-18

## 2021-07-18 RX ORDER — POTASSIUM CHLORIDE 29.8 MG/ML
40 INJECTION INTRAVENOUS ONCE
Status: DISCONTINUED | OUTPATIENT
Start: 2021-07-18 | End: 2021-07-18

## 2021-07-18 RX ORDER — BUMETANIDE 1 MG/1
2 TABLET ORAL
Status: DISCONTINUED | OUTPATIENT
Start: 2021-07-18 | End: 2021-07-19

## 2021-07-18 RX ADMIN — POTASSIUM CHLORIDE 10 MEQ: 10 INJECTION, SOLUTION INTRAVENOUS at 17:17

## 2021-07-18 RX ADMIN — OMEPRAZOLE 40 MG: 20 CAPSULE, DELAYED RELEASE ORAL at 05:40

## 2021-07-18 RX ADMIN — FLUDROCORTISONE ACETATE 0.1 MG: 0.1 TABLET ORAL at 05:40

## 2021-07-18 RX ADMIN — LIOTHYRONINE SODIUM 25 MCG: 25 TABLET ORAL at 21:26

## 2021-07-18 RX ADMIN — FUROSEMIDE 20 MG: 10 INJECTION, SOLUTION INTRAVENOUS at 05:40

## 2021-07-18 RX ADMIN — OMEPRAZOLE 40 MG: 20 CAPSULE, DELAYED RELEASE ORAL at 17:30

## 2021-07-18 RX ADMIN — COLESEVELAM HYDROCHLORIDE 625 MG: 625 TABLET, COATED ORAL at 15:45

## 2021-07-18 RX ADMIN — BUMETANIDE 2 MG: 1 TABLET ORAL at 17:30

## 2021-07-18 RX ADMIN — POTASSIUM CHLORIDE 10 MEQ: 10 INJECTION, SOLUTION INTRAVENOUS at 19:02

## 2021-07-18 RX ADMIN — BUMETANIDE 2 MG: 1 TABLET ORAL at 10:54

## 2021-07-18 RX ADMIN — FENTANYL CITRATE 50 MCG: 50 INJECTION INTRAMUSCULAR; INTRAVENOUS at 06:22

## 2021-07-18 RX ADMIN — GABAPENTIN 400 MG: 400 CAPSULE ORAL at 05:40

## 2021-07-18 RX ADMIN — CARVEDILOL 12.5 MG: 12.5 TABLET, FILM COATED ORAL at 07:59

## 2021-07-18 RX ADMIN — GABAPENTIN 400 MG: 400 CAPSULE ORAL at 17:31

## 2021-07-18 RX ADMIN — COLESEVELAM HYDROCHLORIDE 625 MG: 625 TABLET, COATED ORAL at 21:26

## 2021-07-18 RX ADMIN — Medication 2000 UNITS: at 05:40

## 2021-07-18 RX ADMIN — AMITRIPTYLINE HYDROCHLORIDE 10 MG: 10 TABLET, FILM COATED ORAL at 05:40

## 2021-07-18 RX ADMIN — HYDROCORTISONE 20 MG: 20 TABLET ORAL at 05:41

## 2021-07-18 RX ADMIN — CARVEDILOL 12.5 MG: 12.5 TABLET, FILM COATED ORAL at 17:30

## 2021-07-18 RX ADMIN — FENTANYL CITRATE 50 MCG: 50 INJECTION INTRAMUSCULAR; INTRAVENOUS at 15:53

## 2021-07-18 RX ADMIN — DULOXETINE HYDROCHLORIDE 60 MG: 60 CAPSULE, DELAYED RELEASE ORAL at 21:00

## 2021-07-18 RX ADMIN — COLESEVELAM HYDROCHLORIDE 625 MG: 625 TABLET, COATED ORAL at 08:04

## 2021-07-18 RX ADMIN — FLUDROCORTISONE ACETATE 0.1 MG: 0.1 TABLET ORAL at 17:30

## 2021-07-18 RX ADMIN — LEVOTHYROXINE SODIUM 75 MCG: 0.07 TABLET ORAL at 05:40

## 2021-07-18 RX ADMIN — POTASSIUM CHLORIDE 10 MEQ: 10 INJECTION, SOLUTION INTRAVENOUS at 18:01

## 2021-07-18 RX ADMIN — HYDROCORTISONE 10 MG: 20 TABLET ORAL at 17:30

## 2021-07-18 RX ADMIN — POTASSIUM CHLORIDE 10 MEQ: 10 INJECTION, SOLUTION INTRAVENOUS at 15:45

## 2021-07-18 ASSESSMENT — ENCOUNTER SYMPTOMS
HEMOPTYSIS: 0
COUGH: 0
DIZZINESS: 1
FALLS: 1
ABDOMINAL PAIN: 1
CHILLS: 0
DIARRHEA: 0
VOMITING: 0
DOUBLE VISION: 0
HEARTBURN: 0
NAUSEA: 0
BLOOD IN STOOL: 1
ABDOMINAL PAIN: 0
CONSTIPATION: 0
BLURRED VISION: 0
PALPITATIONS: 0
FEVER: 0
SHORTNESS OF BREATH: 0

## 2021-07-18 ASSESSMENT — PAIN DESCRIPTION - PAIN TYPE
TYPE: ACUTE PAIN
TYPE: ACUTE PAIN

## 2021-07-18 ASSESSMENT — FIBROSIS 4 INDEX: FIB4 SCORE: 29.65
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
## 2021-07-18 NOTE — PROGRESS NOTES
Gastroenterology Progress Note     Author: Ludy Abbasi M.D.   Date & Time Created: 7/18/2021 9:11 AM    Chief Complaint:  Abdominal pain, hematochezia    Interval History:  No new symptoms.  No rectal bleeding.  Denies pruritus.    Review of Systems:  Review of Systems   Constitutional: Negative for chills and fever.   Respiratory: Negative for shortness of breath.    Cardiovascular: Negative for chest pain.   Gastrointestinal: Negative for abdominal pain, constipation, diarrhea, heartburn, nausea and vomiting.       Physical Exam:  Physical Exam  Constitutional:       Appearance: She is obese. She is ill-appearing.   Eyes:      General: Scleral icterus present.   Pulmonary:      Effort: No respiratory distress.   Abdominal:      General: There is distension.      Tenderness: There is no abdominal tenderness.   Musculoskeletal:      Right lower leg: Edema present.      Left lower leg: Edema present.   Skin:     Coloration: Skin is jaundiced.   Neurological:      Mental Status: She is alert and oriented to person, place, and time.      Comments: No signs of hepatic encephalopathy         Labs:          Recent Labs     07/16/21 0524 07/17/21 0234   SODIUM  --  140   POTASSIUM  --  3.2*   CHLORIDE  --  95*   CO2  --  36*   BUN  --  10   CREATININE  --  <0.17*   MAGNESIUM 1.8 2.1   CALCIUM  --  8.0*     Recent Labs     07/15/21  1541 07/16/21 0822 07/17/21 0234   ALTSGPT  --   --  75*   ASTSGOT  --   --  171*   ALKPHOSPHAT  --   --  478*   TBILIRUBIN 22.2*  --  18.2*   DBILIRUBIN >10.0*  --   --    GAMMAGT  --  420*  --    GLUCOSE  --   --  129*     Recent Labs     07/16/21  0524 07/17/21 0234 07/17/21 2149   RBC 2.65* 2.44* 2.66*   HEMOGLOBIN 8.0* 7.2* 8.0*   HEMATOCRIT 24.5* 22.7* 24.9*   PLATELETCT 27* 24* 35*     Recent Labs     07/15/21  1541 07/15/21  1541 07/16/21  0524 07/17/21 0234 07/17/21 2149   WBC 12.3*   < > 13.8* 11.7* 9.6   NEUTSPOLYS 67.60   < > 78.10* 67.70 75.90*   LYMPHOCYTES 11.70*   <  > 8.60* 9.10* 4.50*   MONOCYTES 9.90   < > 5.70 14.20* 6.20   EOSINOPHILS 0.00   < > 0.00 0.00 0.00   BASOPHILS 0.00   < > 0.00 1.00 0.90   ASTSGOT  --   --   --  171*  --    ALTSGPT  --   --   --  75*  --    ALKPHOSPHAT  --   --   --  478*  --    TBILIRUBIN 22.2*  --   --  18.2*  --     < > = values in this interval not displayed.     Hemodynamics:  Temp (24hrs), Av.3 °C (97.4 °F), Min:36.1 °C (97 °F), Max:37 °C (98.6 °F)  Temperature: 37 °C (98.6 °F)  Pulse  Av.1  Min: 54  Max: 114   Blood Pressure: (!) 171/91 (Nurse notified )     Respiratory:    Respiration: 18, Pulse Oximetry: 95 %        RUL Breath Sounds: Clear, RML Breath Sounds: Clear, RLL Breath Sounds: Diminished, TRISTAN Breath Sounds: Clear, LLL Breath Sounds: Diminished  Fluids:    Intake/Output Summary (Last 24 hours) at 2021 1254  Last data filed at 2021 0900  Gross per 24 hour   Intake --   Output 3100 ml   Net -3100 ml     Weight: 106 kg (233 lb 0.4 oz)  GI/Nutrition:  Orders Placed This Encounter   Procedures   • Diet Order Diet: Cardiac     Standing Status:   Standing     Number of Occurrences:   1     Order Specific Question:   Diet:     Answer:   Cardiac [6]     Medical Decision Making, by Problem:  Active Hospital Problems    Diagnosis    • *Coagulopathy (HCC) [D68.9]    • Hepatic steatosis [K76.0]    • DIC (disseminated intravascular coagulation) (HCC) [D65]    • Ischemic colitis (HCC) [K55.9]    • Lower GI bleed [K92.2]    • Epistaxis [R04.0]    • Electrolyte imbalance [E87.8]    • Thrush [B37.0]    • Incontinence [R32]    • Thrombocytopenia (HCC) [D69.6]    • Hemochromatosis [E83.119]    • Recurrent syncope [R55]    • History of pulmonary embolus (PE) [Z86.711]    • Adrenal insufficiency (Valentín's disease) (HCC) [E27.1]    • Lactic acidemia [E87.2]    • Chronic sinusitis [J32.9]        Plan:  56-year-old female with cholestatic hepatitis and jaundice.Serology negative for chronic viral hepatitis or autoimmune liver disease.   Since she has been on extended courses of antibiotics prior to admission, drug-induced liver injury is also in the differential diagnosis.  Based on obesity, she may have underlying chronic liver disease secondary to steatohepatitis.  Hematological profile is suggestive of chronic DIC with severe thrombocytopenia.  Patients with underlying chronic liver disease can develop organ failure including liver failure secondary to DIC.  A definitive answer would require a liver biopsy but she is high risk for bleeding and therefore will be deferred until thrombocytopenia is adequately corrected after further evaluation.     1.  Continue hematology work-up regarding DIC and severe thrombocytopenia (Dabigatran has been reported to cause DIC but not Abixaban)  2.  Transjugular liver biopsy when safe to proceed  3.  Pharmacy consultation regarding possibility of DILI   4.  Continue supportive care  5.  Monitor for hepatic encephalopathy.    Quality-Core Measures

## 2021-07-19 PROBLEM — R74.8 ELEVATED LIVER ENZYMES: Status: ACTIVE | Noted: 2021-07-19

## 2021-07-19 PROBLEM — R60.1 ANASARCA: Status: ACTIVE | Noted: 2021-07-19

## 2021-07-19 LAB
ALBUMIN SERPL BCP-MCNC: 2.6 G/DL (ref 3.2–4.9)
ALBUMIN/GLOB SERPL: 1.6 G/DL
ALP SERPL-CCNC: 419 U/L (ref 30–99)
ALT SERPL-CCNC: 100 U/L (ref 2–50)
AMMONIA PLAS-SCNC: 31 UMOL/L (ref 11–45)
ANION GAP SERPL CALC-SCNC: 11 MMOL/L (ref 7–16)
ANION GAP SERPL CALC-SCNC: 14 MMOL/L (ref 7–16)
ANION GAP SERPL CALC-SCNC: 9 MMOL/L (ref 7–16)
ANISOCYTOSIS BLD QL SMEAR: ABNORMAL
APPEARANCE UR: ABNORMAL
AST SERPL-CCNC: 189 U/L (ref 12–45)
BACTERIA #/AREA URNS HPF: ABNORMAL /HPF
BASE EXCESS BLDA CALC-SCNC: 14 MMOL/L (ref -4–3)
BASOPHILS # BLD AUTO: 0.2 % (ref 0–1.8)
BASOPHILS # BLD AUTO: 0.3 % (ref 0–1.8)
BASOPHILS # BLD: 0.02 K/UL (ref 0–0.12)
BASOPHILS # BLD: 0.02 K/UL (ref 0–0.12)
BILIRUB SERPL-MCNC: 10.1 MG/DL (ref 0.1–1.5)
BILIRUB UR QL STRIP.AUTO: ABNORMAL
BODY TEMPERATURE: ABNORMAL CENTIGRADE
BUN SERPL-MCNC: 10 MG/DL (ref 8–22)
BUN SERPL-MCNC: 11 MG/DL (ref 8–22)
BUN SERPL-MCNC: 9 MG/DL (ref 8–22)
CALCIUM SERPL-MCNC: 7.4 MG/DL (ref 8.5–10.5)
CALCIUM SERPL-MCNC: 7.6 MG/DL (ref 8.5–10.5)
CALCIUM SERPL-MCNC: 7.8 MG/DL (ref 8.5–10.5)
CAOX CRY #/AREA URNS HPF: ABNORMAL /HPF
CHLORIDE SERPL-SCNC: 90 MMOL/L (ref 96–112)
CHLORIDE SERPL-SCNC: 91 MMOL/L (ref 96–112)
CHLORIDE SERPL-SCNC: 93 MMOL/L (ref 96–112)
CO2 SERPL-SCNC: 37 MMOL/L (ref 20–33)
CO2 SERPL-SCNC: 38 MMOL/L (ref 20–33)
CO2 SERPL-SCNC: 40 MMOL/L (ref 20–33)
COLOR UR: ABNORMAL
COMMENT 1642: NORMAL
CREAT SERPL-MCNC: 0.22 MG/DL (ref 0.5–1.4)
CREAT SERPL-MCNC: 0.34 MG/DL (ref 0.5–1.4)
CREAT SERPL-MCNC: 0.5 MG/DL (ref 0.5–1.4)
EOSINOPHIL # BLD AUTO: 0 K/UL (ref 0–0.51)
EOSINOPHIL # BLD AUTO: 0 K/UL (ref 0–0.51)
EOSINOPHIL NFR BLD: 0 % (ref 0–6.9)
EOSINOPHIL NFR BLD: 0 % (ref 0–6.9)
EPI CELLS #/AREA URNS HPF: NEGATIVE /HPF
ERYTHROCYTE [DISTWIDTH] IN BLOOD BY AUTOMATED COUNT: 79.5 FL (ref 35.9–50)
ERYTHROCYTE [DISTWIDTH] IN BLOOD BY AUTOMATED COUNT: 90.1 FL (ref 35.9–50)
GLOBULIN SER CALC-MCNC: 1.6 G/DL (ref 1.9–3.5)
GLUCOSE BLD-MCNC: 143 MG/DL (ref 65–99)
GLUCOSE SERPL-MCNC: 140 MG/DL (ref 65–99)
GLUCOSE SERPL-MCNC: 143 MG/DL (ref 65–99)
GLUCOSE SERPL-MCNC: 86 MG/DL (ref 65–99)
GLUCOSE UR STRIP.AUTO-MCNC: NEGATIVE MG/DL
HCO3 BLDA-SCNC: 37 MMOL/L (ref 17–25)
HCT VFR BLD AUTO: 24 % (ref 37–47)
HCT VFR BLD AUTO: 27.2 % (ref 37–47)
HGB BLD-MCNC: 7.5 G/DL (ref 12–16)
HGB BLD-MCNC: 8.6 G/DL (ref 12–16)
HYALINE CASTS #/AREA URNS LPF: ABNORMAL /LPF
HYPOCHROMIA BLD QL SMEAR: ABNORMAL
IMM GRANULOCYTES # BLD AUTO: 0.15 K/UL (ref 0–0.11)
IMM GRANULOCYTES # BLD AUTO: 0.45 K/UL (ref 0–0.11)
IMM GRANULOCYTES NFR BLD AUTO: 2 % (ref 0–0.9)
IMM GRANULOCYTES NFR BLD AUTO: 4.7 % (ref 0–0.9)
KETONES UR STRIP.AUTO-MCNC: NEGATIVE MG/DL
LEUKOCYTE ESTERASE UR QL STRIP.AUTO: ABNORMAL
LYMPHOCYTES # BLD AUTO: 0.99 K/UL (ref 1–4.8)
LYMPHOCYTES # BLD AUTO: 1.78 K/UL (ref 1–4.8)
LYMPHOCYTES NFR BLD: 13 % (ref 22–41)
LYMPHOCYTES NFR BLD: 18.5 % (ref 22–41)
MACROCYTES BLD QL SMEAR: ABNORMAL
MAGNESIUM SERPL-MCNC: 1.2 MG/DL (ref 1.5–2.5)
MAGNESIUM SERPL-MCNC: 2.2 MG/DL (ref 1.5–2.5)
MCH RBC QN AUTO: 30.2 PG (ref 27–33)
MCH RBC QN AUTO: 31 PG (ref 27–33)
MCHC RBC AUTO-ENTMCNC: 31.3 G/DL (ref 33.6–35)
MCHC RBC AUTO-ENTMCNC: 31.6 G/DL (ref 33.6–35)
MCV RBC AUTO: 95.4 FL (ref 81.4–97.8)
MCV RBC AUTO: 99.2 FL (ref 81.4–97.8)
MICRO URNS: ABNORMAL
MICROCYTES BLD QL SMEAR: ABNORMAL
MONOCYTES # BLD AUTO: 0.87 K/UL (ref 0–0.85)
MONOCYTES # BLD AUTO: 1.35 K/UL (ref 0–0.85)
MONOCYTES NFR BLD AUTO: 11.4 % (ref 0–13.4)
MONOCYTES NFR BLD AUTO: 14 % (ref 0–13.4)
MORPHOLOGY BLD-IMP: NORMAL
NEUTROPHILS # BLD AUTO: 5.61 K/UL (ref 2–7.15)
NEUTROPHILS # BLD AUTO: 6.04 K/UL (ref 2–7.15)
NEUTROPHILS NFR BLD: 62.6 % (ref 44–72)
NEUTROPHILS NFR BLD: 73.3 % (ref 44–72)
NITRITE UR QL STRIP.AUTO: NEGATIVE
NRBC # BLD AUTO: 0.31 K/UL
NRBC # BLD AUTO: 0.59 K/UL
NRBC BLD-RTO: 4.1 /100 WBC
NRBC BLD-RTO: 6.1 /100 WBC
PCO2 BLDA: 38.9 MMHG (ref 26–37)
PH BLDA: 7.6 [PH] (ref 7.4–7.5)
PH UR STRIP.AUTO: 8 [PH] (ref 5–8)
PLATELET # BLD AUTO: 57 K/UL (ref 164–446)
PLATELET # BLD AUTO: 73 K/UL (ref 164–446)
PLATELET BLD QL SMEAR: NORMAL
PO2 BLDA: 99.6 MMHG (ref 64–87)
POIKILOCYTOSIS BLD QL SMEAR: NORMAL
POLYCHROMASIA BLD QL SMEAR: NORMAL
POTASSIUM SERPL-SCNC: 2.2 MMOL/L (ref 3.6–5.5)
POTASSIUM SERPL-SCNC: 2.2 MMOL/L (ref 3.6–5.5)
POTASSIUM SERPL-SCNC: 3.2 MMOL/L (ref 3.6–5.5)
PROT SERPL-MCNC: 4.2 G/DL (ref 6–8.2)
PROT UR QL STRIP: NEGATIVE MG/DL
RBC # BLD AUTO: 2.42 M/UL (ref 4.2–5.4)
RBC # BLD AUTO: 2.85 M/UL (ref 4.2–5.4)
RBC # URNS HPF: ABNORMAL /HPF
RBC BLD AUTO: PRESENT
RBC UR QL AUTO: ABNORMAL
SAO2 % BLDA: 96.5 % (ref 93–99)
SODIUM SERPL-SCNC: 139 MMOL/L (ref 135–145)
SODIUM SERPL-SCNC: 142 MMOL/L (ref 135–145)
SODIUM SERPL-SCNC: 142 MMOL/L (ref 135–145)
SP GR UR STRIP.AUTO: 1.01
STOMATOCYTES BLD QL SMEAR: NORMAL
UROBILINOGEN UR STRIP.AUTO-MCNC: 0.2 MG/DL
WBC # BLD AUTO: 7.6 K/UL (ref 4.8–10.8)
WBC # BLD AUTO: 9.6 K/UL (ref 4.8–10.8)
WBC #/AREA URNS HPF: ABNORMAL /HPF

## 2021-07-19 PROCEDURE — A9270 NON-COVERED ITEM OR SERVICE: HCPCS | Performed by: STUDENT IN AN ORGANIZED HEALTH CARE EDUCATION/TRAINING PROGRAM

## 2021-07-19 PROCEDURE — 700102 HCHG RX REV CODE 250 W/ 637 OVERRIDE(OP): Performed by: INTERNAL MEDICINE

## 2021-07-19 PROCEDURE — 85025 COMPLETE CBC W/AUTO DIFF WBC: CPT

## 2021-07-19 PROCEDURE — 700111 HCHG RX REV CODE 636 W/ 250 OVERRIDE (IP): Performed by: STUDENT IN AN ORGANIZED HEALTH CARE EDUCATION/TRAINING PROGRAM

## 2021-07-19 PROCEDURE — 83735 ASSAY OF MAGNESIUM: CPT

## 2021-07-19 PROCEDURE — 700102 HCHG RX REV CODE 250 W/ 637 OVERRIDE(OP)

## 2021-07-19 PROCEDURE — A9270 NON-COVERED ITEM OR SERVICE: HCPCS | Performed by: FAMILY MEDICINE

## 2021-07-19 PROCEDURE — 82803 BLOOD GASES ANY COMBINATION: CPT

## 2021-07-19 PROCEDURE — 99232 SBSQ HOSP IP/OBS MODERATE 35: CPT | Mod: GC | Performed by: HOSPITALIST

## 2021-07-19 PROCEDURE — A9270 NON-COVERED ITEM OR SERVICE: HCPCS | Performed by: INTERNAL MEDICINE

## 2021-07-19 PROCEDURE — 302146: Performed by: HOSPITALIST

## 2021-07-19 PROCEDURE — 86757 RICKETTSIA ANTIBODY: CPT

## 2021-07-19 PROCEDURE — 87040 BLOOD CULTURE FOR BACTERIA: CPT

## 2021-07-19 PROCEDURE — 700102 HCHG RX REV CODE 250 W/ 637 OVERRIDE(OP): Performed by: STUDENT IN AN ORGANIZED HEALTH CARE EDUCATION/TRAINING PROGRAM

## 2021-07-19 PROCEDURE — 84133 ASSAY OF URINE POTASSIUM: CPT

## 2021-07-19 PROCEDURE — 82436 ASSAY OF URINE CHLORIDE: CPT

## 2021-07-19 PROCEDURE — 80048 BASIC METABOLIC PNL TOTAL CA: CPT

## 2021-07-19 PROCEDURE — 700102 HCHG RX REV CODE 250 W/ 637 OVERRIDE(OP): Performed by: FAMILY MEDICINE

## 2021-07-19 PROCEDURE — 700105 HCHG RX REV CODE 258: Performed by: STUDENT IN AN ORGANIZED HEALTH CARE EDUCATION/TRAINING PROGRAM

## 2021-07-19 PROCEDURE — 82962 GLUCOSE BLOOD TEST: CPT

## 2021-07-19 PROCEDURE — A9270 NON-COVERED ITEM OR SERVICE: HCPCS

## 2021-07-19 PROCEDURE — 84300 ASSAY OF URINE SODIUM: CPT

## 2021-07-19 PROCEDURE — 700111 HCHG RX REV CODE 636 W/ 250 OVERRIDE (IP): Performed by: INTERNAL MEDICINE

## 2021-07-19 PROCEDURE — 80053 COMPREHEN METABOLIC PANEL: CPT

## 2021-07-19 PROCEDURE — 82140 ASSAY OF AMMONIA: CPT

## 2021-07-19 PROCEDURE — 770020 HCHG ROOM/CARE - TELE (206)

## 2021-07-19 PROCEDURE — 81001 URINALYSIS AUTO W/SCOPE: CPT

## 2021-07-19 RX ORDER — POTASSIUM CHLORIDE 7.45 MG/ML
10 INJECTION INTRAVENOUS
Status: COMPLETED | OUTPATIENT
Start: 2021-07-19 | End: 2021-07-19

## 2021-07-19 RX ORDER — POTASSIUM CHLORIDE 20 MEQ/1
40 TABLET, EXTENDED RELEASE ORAL 2 TIMES DAILY
Status: DISCONTINUED | OUTPATIENT
Start: 2021-07-19 | End: 2021-07-19

## 2021-07-19 RX ORDER — POTASSIUM CHLORIDE 20 MEQ/1
40 TABLET, EXTENDED RELEASE ORAL ONCE
Status: COMPLETED | OUTPATIENT
Start: 2021-07-19 | End: 2021-07-19

## 2021-07-19 RX ORDER — BUMETANIDE 1 MG/1
2 TABLET ORAL 3 TIMES DAILY
Status: DISCONTINUED | OUTPATIENT
Start: 2021-07-19 | End: 2021-07-21

## 2021-07-19 RX ORDER — POTASSIUM CHLORIDE 20 MEQ/1
20 TABLET, EXTENDED RELEASE ORAL ONCE
Status: ACTIVE | OUTPATIENT
Start: 2021-07-19 | End: 2021-07-20

## 2021-07-19 RX ORDER — SODIUM CHLORIDE 9 MG/ML
250 INJECTION, SOLUTION INTRAVENOUS ONCE
Status: COMPLETED | OUTPATIENT
Start: 2021-07-19 | End: 2021-07-19

## 2021-07-19 RX ORDER — POTASSIUM CHLORIDE 20 MEQ/1
60 TABLET, EXTENDED RELEASE ORAL EVERY 4 HOURS
Status: DISCONTINUED | OUTPATIENT
Start: 2021-07-19 | End: 2021-07-19

## 2021-07-19 RX ORDER — SODIUM CHLORIDE 9 MG/ML
500 INJECTION, SOLUTION INTRAVENOUS ONCE
Status: COMPLETED | OUTPATIENT
Start: 2021-07-19 | End: 2021-07-19

## 2021-07-19 RX ORDER — MAGNESIUM SULFATE HEPTAHYDRATE 40 MG/ML
4 INJECTION, SOLUTION INTRAVENOUS ONCE
Status: COMPLETED | OUTPATIENT
Start: 2021-07-19 | End: 2021-07-19

## 2021-07-19 RX ORDER — POTASSIUM CHLORIDE 7.45 MG/ML
10 INJECTION INTRAVENOUS
Status: COMPLETED | OUTPATIENT
Start: 2021-07-19 | End: 2021-07-20

## 2021-07-19 RX ORDER — POTASSIUM CHLORIDE 20 MEQ/1
40 TABLET, EXTENDED RELEASE ORAL 2 TIMES DAILY
Status: DISCONTINUED | OUTPATIENT
Start: 2021-07-19 | End: 2021-07-22

## 2021-07-19 RX ORDER — POTASSIUM CHLORIDE 20 MEQ/1
40 TABLET, EXTENDED RELEASE ORAL DAILY
Status: DISCONTINUED | OUTPATIENT
Start: 2021-07-20 | End: 2021-07-19

## 2021-07-19 RX ADMIN — POTASSIUM CHLORIDE 10 MEQ: 7.46 INJECTION, SOLUTION INTRAVENOUS at 10:28

## 2021-07-19 RX ADMIN — CARVEDILOL 12.5 MG: 12.5 TABLET, FILM COATED ORAL at 17:21

## 2021-07-19 RX ADMIN — POTASSIUM CHLORIDE 10 MEQ: 7.46 INJECTION, SOLUTION INTRAVENOUS at 09:19

## 2021-07-19 RX ADMIN — POTASSIUM CHLORIDE 10 MEQ: 7.46 INJECTION, SOLUTION INTRAVENOUS at 18:27

## 2021-07-19 RX ADMIN — HYDROCORTISONE 10 MG: 20 TABLET ORAL at 17:21

## 2021-07-19 RX ADMIN — POTASSIUM CHLORIDE 10 MEQ: 7.46 INJECTION, SOLUTION INTRAVENOUS at 06:51

## 2021-07-19 RX ADMIN — LIOTHYRONINE SODIUM 25 MCG: 25 TABLET ORAL at 21:02

## 2021-07-19 RX ADMIN — FENTANYL CITRATE 50 MCG: 50 INJECTION INTRAMUSCULAR; INTRAVENOUS at 09:27

## 2021-07-19 RX ADMIN — MAGNESIUM SULFATE IN WATER 4 G: 40 INJECTION, SOLUTION INTRAVENOUS at 15:01

## 2021-07-19 RX ADMIN — LEVOTHYROXINE SODIUM 75 MCG: 0.07 TABLET ORAL at 05:38

## 2021-07-19 RX ADMIN — GABAPENTIN 400 MG: 400 CAPSULE ORAL at 05:38

## 2021-07-19 RX ADMIN — POTASSIUM CHLORIDE 40 MEQ: 1500 TABLET, EXTENDED RELEASE ORAL at 06:51

## 2021-07-19 RX ADMIN — FENTANYL CITRATE 50 MCG: 50 INJECTION INTRAMUSCULAR; INTRAVENOUS at 15:59

## 2021-07-19 RX ADMIN — POTASSIUM CHLORIDE 10 MEQ: 7.46 INJECTION, SOLUTION INTRAVENOUS at 16:02

## 2021-07-19 RX ADMIN — Medication 2000 UNITS: at 05:38

## 2021-07-19 RX ADMIN — BUMETANIDE 2 MG: 1 TABLET ORAL at 05:39

## 2021-07-19 RX ADMIN — HYDROCORTISONE 20 MG: 20 TABLET ORAL at 05:39

## 2021-07-19 RX ADMIN — POTASSIUM CHLORIDE 40 MEQ: 1500 TABLET, EXTENDED RELEASE ORAL at 15:00

## 2021-07-19 RX ADMIN — OMEPRAZOLE 40 MG: 20 CAPSULE, DELAYED RELEASE ORAL at 17:21

## 2021-07-19 RX ADMIN — COLESEVELAM HYDROCHLORIDE 625 MG: 625 TABLET, COATED ORAL at 21:02

## 2021-07-19 RX ADMIN — POTASSIUM CHLORIDE 10 MEQ: 7.46 INJECTION, SOLUTION INTRAVENOUS at 08:13

## 2021-07-19 RX ADMIN — BUMETANIDE 2 MG: 1 TABLET ORAL at 12:23

## 2021-07-19 RX ADMIN — COLESEVELAM HYDROCHLORIDE 625 MG: 625 TABLET, COATED ORAL at 09:51

## 2021-07-19 RX ADMIN — AMITRIPTYLINE HYDROCHLORIDE 10 MG: 10 TABLET, FILM COATED ORAL at 05:38

## 2021-07-19 RX ADMIN — POTASSIUM CHLORIDE 10 MEQ: 10 INJECTION, SOLUTION INTRAVENOUS at 22:48

## 2021-07-19 RX ADMIN — POTASSIUM CHLORIDE 10 MEQ: 7.46 INJECTION, SOLUTION INTRAVENOUS at 15:01

## 2021-07-19 RX ADMIN — POTASSIUM CHLORIDE 40 MEQ: 1500 TABLET, EXTENDED RELEASE ORAL at 17:21

## 2021-07-19 RX ADMIN — SODIUM CHLORIDE 500 ML: 9 INJECTION, SOLUTION INTRAVENOUS at 22:15

## 2021-07-19 RX ADMIN — FLUDROCORTISONE ACETATE 0.1 MG: 0.1 TABLET ORAL at 05:38

## 2021-07-19 RX ADMIN — OMEPRAZOLE 40 MG: 20 CAPSULE, DELAYED RELEASE ORAL at 05:38

## 2021-07-19 RX ADMIN — GABAPENTIN 400 MG: 400 CAPSULE ORAL at 17:21

## 2021-07-19 RX ADMIN — FENTANYL CITRATE 50 MCG: 50 INJECTION INTRAMUSCULAR; INTRAVENOUS at 05:38

## 2021-07-19 RX ADMIN — DULOXETINE HYDROCHLORIDE 60 MG: 60 CAPSULE, DELAYED RELEASE ORAL at 21:02

## 2021-07-19 RX ADMIN — COLESEVELAM HYDROCHLORIDE 625 MG: 625 TABLET, COATED ORAL at 15:00

## 2021-07-19 RX ADMIN — POTASSIUM CHLORIDE 10 MEQ: 7.46 INJECTION, SOLUTION INTRAVENOUS at 17:08

## 2021-07-19 RX ADMIN — SODIUM CHLORIDE 250 ML: 9 INJECTION, SOLUTION INTRAVENOUS at 21:35

## 2021-07-19 RX ADMIN — FLUDROCORTISONE ACETATE 0.1 MG: 0.1 TABLET ORAL at 17:21

## 2021-07-19 ASSESSMENT — ENCOUNTER SYMPTOMS
RESPIRATORY NEGATIVE: 1
VOMITING: 0
NAUSEA: 0
SHORTNESS OF BREATH: 0
PALPITATIONS: 0
ABDOMINAL PAIN: 1
SPEECH CHANGE: 0
MYALGIAS: 0
WEAKNESS: 1
CARDIOVASCULAR NEGATIVE: 1
COUGH: 0
FEVER: 0
CHILLS: 0

## 2021-07-19 ASSESSMENT — PAIN DESCRIPTION - PAIN TYPE
TYPE: ACUTE PAIN
TYPE: ACUTE PAIN

## 2021-07-19 ASSESSMENT — FIBROSIS 4 INDEX: FIB4 SCORE: 14.76

## 2021-07-19 NOTE — PROGRESS NOTES
Daily Progress Note:     Date of Service: 7/19/2021  Primary Team: [facility] Blue Team   Attending: Edson Santizo M.D.   Senior Resident: Dr. Escalante  Intern: Dr. Jones  Contact:  [phone]    Chief Complaint:   Abdominal pain    Subjective:  No acute events overnight.  She has been having episodes of abdominal pain across her upper abdomen, with intermittent episodes of increased severity of a sharp pain.  This pain doesn't radiate anywhere and can reach an 8/10 at times.  She is continuing to have BMs, w/ most recent last evening.  She has been able to intake PO w/o any nausea or vomiting.    Consultants/Specialty:  General Surgery - [facility]  GI - [facility]  ID- Lynnette ID  Review of Systems:    Review of Systems   Constitutional: Negative for chills and fever.   Respiratory: Negative for cough and shortness of breath.    Cardiovascular: Negative for chest pain and palpitations.   Gastrointestinal: Positive for abdominal pain. Negative for nausea and vomiting.   Genitourinary: Negative for dysuria and urgency.   Musculoskeletal: Negative for myalgias.   Skin: Negative for rash.   Neurological: Positive for weakness. Negative for speech change.       Objective Data:   Physical Exam:   Vitals:   Temp:  [36.1 °C (97 °F)-36.8 °C (98.3 °F)] 36.8 °C (98.3 °F)  Pulse:  [58-87] 80  Resp:  [16] 16  BP: (126-152)/(70-90) 126/74  SpO2:  [91 %-98 %] 98 %    Physical Exam  Constitutional:       General: She is not in acute distress.     Appearance: She is not toxic-appearing.   HENT:      Head: Normocephalic and atraumatic.      Mouth/Throat:      Mouth: Mucous membranes are moist.      Pharynx: Oropharynx is clear.   Eyes:      General: Scleral icterus present.      Extraocular Movements: Extraocular movements intact.   Cardiovascular:      Rate and Rhythm: Normal rate and regular rhythm.      Pulses: Normal pulses.      Heart sounds: Normal heart sounds. No murmur heard.   No friction rub. No gallop.    Pulmonary:      Effort:  Pulmonary effort is normal. No respiratory distress.      Breath sounds: Normal breath sounds. No wheezing, rhonchi or rales.   Abdominal:      General: Abdomen is flat. Bowel sounds are normal.      Palpations: Abdomen is soft.      Tenderness: There is abdominal tenderness (TTP RUQ>LUQ). There is no guarding or rebound.   Musculoskeletal:      Cervical back: Normal range of motion.      Right lower leg: Edema present.      Left lower leg: Edema present.      Comments: B/l LE 3+ pitting edema up to the hip.  3+ pitting edema in b/l UE w/ fluid weeping from skin   Skin:     General: Skin is warm.      Coloration: Skin is jaundiced.      Comments: Diffuse jaundice   Neurological:      General: No focal deficit present.      Mental Status: She is alert.   Psychiatric:         Mood and Affect: Mood normal.         Behavior: Behavior normal.           Labs:   HEMATOLOGY/ ONCOLOGY/ID:            Recent Labs     07/17/21  0234 07/17/21  0234 07/17/21  2149 07/18/21  1150 07/19/21  0525   WBC 11.7*   < > 9.6 10.2 9.6   RBC 2.44*   < > 2.66* 2.73* 2.85*   HEMOGLOBIN 7.2*   < > 8.0* 8.3* 8.6*   HEMATOCRIT 22.7*   < > 24.9* 25.9* 27.2*   MCV 93.0   < > 93.6 94.9 95.4   MCH 29.5   < > 30.1 30.4 30.2   RDW 63.1*   < > 62.8* 64.7* 79.5*   PLATELETCT 24*   < > 35* 40* 73*   NEUTSPOLYS 67.70   < > 75.90* 72.90* 62.60   LYMPHOCYTES 9.10*   < > 4.50* 8.40* 18.50*   MONOCYTES 14.20*   < > 6.20 7.50 14.00*   EOSINOPHILS 0.00   < > 0.00 0.00 0.00   BASOPHILS 1.00   < > 0.90 0.00 0.20   RBCMORPHOLO Present  --  Present Present  --     < > = values in this interval not displayed.     Lab Results   Component Value Date    YZLTVQAZ73 3701 (H) 07/09/2021    QFQBGFBF88 528 09/18/2018    FOLATE 8.0 07/16/2021    FERRITIN 383.0 (H) 07/13/2021    FERRITIN 136.0 07/09/2021    FERRITIN 94.1 06/30/2021    IRON 106 07/13/2021    IRON 113 07/09/2021    IRON 136 06/30/2021    TOTIRONBC 123 (L) 07/13/2021    TOTIRONBC 130 (L) 07/09/2021    [facility]  153 (L) 06/30/2021       RENAL:        Estimated GFR/CRCL = Estimated Creatinine Clearance: 215.6 mL/min (A) (by C-G formula based on SCr of 0.34 mg/dL (L)).  Recent Labs     07/17/21  0234 07/17/21  0234 07/18/21  1150 07/19/21  0525 07/19/21  1250   SODIUM 140   < > 139 142 142   POTASSIUM 3.2*   < > 2.4* 2.2* 2.2*   CHLORIDE 95*   < > 93* 93* 91*   CO2 36*   < > 35* 40* 37*   GLUCOSE 129*   < > 175* 86 143*   BUN 10   < > 10 10 9   CREATININE <0.17*   < > 0.24* 0.22* 0.34*   CALCIUM 8.0*   < > 7.9* 7.8* 7.6*   MAGNESIUM 2.1  --   --   --  1.2*   ALBUMIN 2.8*  --  2.7* 2.6*  --     < > = values in this interval not displayed.       GASTROINTESTINAL/ HEPATIC:          Recent Labs     07/17/21  0234 07/17/21  2149 07/18/21  1150 07/19/21  0525   ALTSGPT 75*  --  99* 100*   ASTSGOT 171*  --  207* 189*   ALKPHOSPHAT 478*  --  454* 419*   TBILIRUBIN 18.2*  --  13.1* 10.1*   ALBUMIN 2.8*  --  2.7* 2.6*   GLOBULIN 1.1*  --  1.5* 1.6*   MACROCYTOSIS 1+ 1+ 2+*  --      No results found for: AMMONIA    ENDOCRINE:              Recent Labs     07/18/21  1150 07/19/21  0525 07/19/21  1250   GLUCOSE 175* 86 143*     Lab Results   Component Value Date    HBA1C 6.0 (H) 06/29/2021    HBA1C 6.0 (H) 04/27/2021    HBA1C 6.0 (H) 06/19/2020    FREET4 1.27 06/29/2021    FREET4 1.40 06/16/2021    FREET4 1.16 06/15/2021    FREET3 2.46 05/12/2021    FREET3 3.10 12/09/2019    FREET3 4.28 (H) 10/18/2019    CORTISOL >63.4 (H) 06/22/2021    CORTISOL 5.1 06/17/2021    CORTISOL 2.7 05/25/2021         Problem Representation:  Donna Isaac  is a 56 y.o. y/o woman w/ PMHx DVT on eliquis, viral's dz w/ recurrent syncope, diverticulosis, HTN, hypothyroid, MI who presented on 7/9/2021 w/ hematochezia, new thrombocytopenia with decreased hemoglobin and concerns for DIC of unknown etiology vs liver pathology leading to thrombocytopenia.      * Thrombocytopenia (HCC)- (present on admission)  Assessment & Plan  Pt w/ new onset thrombocytopenia on  admission.  Initially thought to be DIC, but no active infection or suspicion for malignancy, peripheral smear w/o schistocytes.  Plt ab negative, no cirrhosis on elastography, no splenomegaly, B12, folate normal.  Etiology at this time is unknown, but leading ddx would include decreased production from bone marrow infiltration, but in the setting of no known malignancy, including lymphoma/leukemia this is less likely.  Other considerations would be drug induced liver injury leading to thrombocytopenia, she is also on multiple medications and frequently in the hospital requiring the use of abx, recently requiring vanc and meropenem on 7/11.  In some case reports, DOACs have also contributed to thrombocytopenia, as well as has lasix.  ITP is unlikely, as she has had a good response to platelets during this admission.  MRCP on 7/16 shows no intra or extrahepatic dilatation that would be concerning for cholestasis or obstruction.  -Consider Liver Bx pending recovery of platelets.  -Consider bone marrow bx  -AM CBC  -Rickettsia panel pending  -Transfuse plt if <20k      Elevated liver enzymes  Assessment & Plan  Initially there was a cholestatic liver pattern, w/ mild elevations of AST and ALT, which have now peaked and are trending down.  Alk phos has peaked as well and is trending down.  Although she is s/p cholecystectomy, there could have been some biliary sludge contributing to a cholestatic pattern.  MRCP on 6/17 shows no concern for cholestasis at this time.  Hepatitis A, B, C have been non-reactive, HIV negative, AMA, KINZA negative, celiac screen negative, HSV IgG elevated, EBV IgG elevated, CMV IgG negative.  -Alpha-1 antitrypsin pending      Anasarca  Assessment & Plan  Pt anasarcic with pitting edema throughout the whole LE and in the UE as well.  This is likely multifactorial w/ a components of heart failure leading to gut edema and acute exacerbation, adrenal insufficiency leading to electrolyte imbalances  and liver disease all playing roles.  -Bumex held this PM in the setting of acute hypokalemia.  -If resolution of hypokalemia, will restart w/ bumetanide 2 mg TID tomorrow  -40 mEq Potassium PO TID while diuresing  -400 mg magnesium oxide PO QD while diuresing    Hypokalemia  Assessment & Plan  Pt w/ worsening hypokalemia during diuresis.  Attempted repletion w/ 80 mg IV today and 80 mg PO.  Hypokalemia is likely 2/2 to aggressive diuresis w/ bumex.  Hypokalemia will be rate limiting factor for diuresis.  Magnesium has also found to be low  -40 mEq Potassium PO TID during diuresis  -400 mg Magnesium oxide PO QD  -Repeat BMP at 1900  -Repeat BMP AM  -Repeat Mg AM    Recurrent syncope- (present on admission)  Assessment & Plan  See adrenal insufficiency for A+P.    Adrenal insufficiency ([place]'s disease) (HCC)- (present on admission)  Assessment & Plan  Pt w/ hx of viral's disease leading to recurrent syncopal events that appear to be orthostatic.  She notes that she has been having episodes of syncope more frequently lately, and continues to have them while in bed.  We have continued her home meds here.  This will likely be an ongoing issue and will need further med titration outpatient to reduce the amount of syncopal events.    -Fludrocortisone 0.1 PO BID  -Hydrocortisone 20 mg in AM, 10 mg in PM daily      Lower GI bleed- (present on admission)  Assessment & Plan  3 episodes of hematochezia at rehab center, has resolved with no hematochezia or melena    History of pulmonary embolus (PE)- (present on admission)  Assessment & Plan  CTA of chest done on 4/27/2021 showed small left lower lobe subsegmental PE.  However repeat CTA on 5/10/2021 showed resolution.  Patient supposed to be on Eliquis for 3-month. Per vascular medicine she was not recommended to continue. Discontinued    Hypothyroidism- (present on admission)  Assessment & Plan  Hx of hypothyroidism.  Continue home Levothyroxine 75 mcg PO QD      Code  Status: Full  DVT ppx: CI in the setting of low platelets  Diet: Cardiac diet  GI: Bowel regimen  T/L/D: CVC left IJV, reyna catheter      Minh Jones DO  PGY-1, [facility]    Assessment and plan discussed with senior resident and attending physician.

## 2021-07-19 NOTE — PROGRESS NOTES
Monitor Summary:    SR/SB 53-74  Rare PVC/PAC/Trigeminy/Bigeminy   .22/.08/.40     H/O hernia repair

## 2021-07-19 NOTE — CARE PLAN
The patient is Stable - Low risk of patient condition declining or worsening    Shift Goals  Clinical Goals: pain control, monitor labs  Patient Goals: rest and pain control    Progress made toward(s) clinical / shift goals:    Problem: Fall Risk  Goal: Patient will remain free from falls  Outcome: Progressing     Problem: Skin Integrity  Goal: Skin integrity is maintained or improved  Outcome: Progressing     Problem: Pain - Standard  Goal: Alleviation of pain or a reduction in pain to the patient’s comfort goal  Outcome: Progressing       Patient is not progressing towards the following goals:

## 2021-07-19 NOTE — PROGRESS NOTES
Monitor Summary.   Rhythm/Rate:SB-SR 58-76  Ectopy: PVC, MF PVC, coup,   Measurements:.22/.07/.44

## 2021-07-19 NOTE — PROGRESS NOTES
Gastroenterology Progress Note     Author: Jamel Oden M.D.   Date & Time Created: 2021 9:07 AM    Chief Complaint:  hepatitis    Interval History:  Overall improving daily.    Denies any hematochezia.      Review of Systems:  Review of Systems   Respiratory: Negative.    Cardiovascular: Negative.    Gastrointestinal:        Continued poor appetite, eating ~1/3 of cardiac diet.       Physical Exam:  Physical Exam  Cardiovascular:      Rate and Rhythm: Normal rate.      Pulses: Normal pulses.   Pulmonary:      Effort: Pulmonary effort is normal.   Abdominal:      General: Abdomen is flat.      Palpations: Abdomen is soft.         Labs:          Recent Labs     21  0525   SODIUM 140 139 142   POTASSIUM 3.2* 2.4* 2.2*   CHLORIDE 95* 93* 93*   CO2 36* 35* 40*   BUN 10 10 10   CREATININE <0.17* 0.24* 0.22*   MAGNESIUM 2.1  --   --    CALCIUM 8.0* 7.9* 7.8*     Recent Labs     21  0525   ALTSGPT 75* 99* 100*   ASTSGOT 171* 207* 189*   ALKPHOSPHAT 478* 454* 419*   TBILIRUBIN 18.2* 13.1* 10.1*   GLUCOSE 129* 175* 86     Recent Labs     21  0525   RBC 2.66* 2.73* 2.85*   HEMOGLOBIN 8.0* 8.3* 8.6*   HEMATOCRIT 24.9* 25.9* 27.2*   PLATELETCT 35* 40* 73*     Recent Labs     21  0525   WBC 11.7*   < > 9.6 10.2 9.6   NEUTSPOLYS 67.70   < > 75.90* 72.90* 62.60   LYMPHOCYTES 9.10*   < > 4.50* 8.40* 18.50*   MONOCYTES 14.20*   < > 6.20 7.50 14.00*   EOSINOPHILS 0.00   < > 0.00 0.00 0.00   BASOPHILS 1.00   < > 0.90 0.00 0.20   ASTSGOT 171*  --   --  207* 189*   ALTSGPT 75*  --   --  99* 100*   ALKPHOSPHAT 478*  --   --  454* 419*   TBILIRUBIN 18.2*  --   --  13.1* 10.1*    < > = values in this interval not displayed.     Hemodynamics:  Temp (24hrs), Av.6 °C (97.8 °F), Min:36.1 °C (97 °F), Max:37 °C (98.6 °F)  Temperature: 36.2 °C (97.1 °F)  Pulse   Av.2  Min: 54  Max: 114   Blood Pressure: 147/83     Respiratory:    Respiration: 16, Pulse Oximetry: 92 %        RUL Breath Sounds: Clear, RML Breath Sounds: Clear, RLL Breath Sounds: Diminished, TIRSTAN Breath Sounds: Clear, LLL Breath Sounds: Diminished  Fluids:    Intake/Output Summary (Last 24 hours) at 2021 0907  Last data filed at 2021 0535  Gross per 24 hour   Intake --   Output 2600 ml   Net -2600 ml     Weight: 105 kg (231 lb 14.8 oz)  GI/Nutrition:  Orders Placed This Encounter   Procedures   • Diet Order Diet: Cardiac     Standing Status:   Standing     Number of Occurrences:   1     Order Specific Question:   Diet:     Answer:   Cardiac [6]     Medical Decision Making, by Problem:  Active Hospital Problems    Diagnosis    • *Coagulopathy (HCC) [D68.9]    • Hepatic steatosis [K76.0]    • DIC (disseminated intravascular coagulation) (HCC) [D65]    • Ischemic colitis (HCC) [K55.9]    • Lower GI bleed [K92.2]    • Epistaxis [R04.0]    • Electrolyte imbalance [E87.8]    • Thrush [B37.0]    • Incontinence [R32]    • Thrombocytopenia (HCC) [D69.6]    • Hemochromatosis [E83.119]    • Recurrent syncope [R55]    • History of pulmonary embolus (PE) [Z86.711]    • Adrenal insufficiency ([place]'s disease) (HCC) [E27.1]    • Lactic acidemia [E87.2]    • Chronic sinusitis [J32.9]        Plan:  Severe hepatitis, improving with conservative/supportive management.  Would recommend continued close serologic monitoring.  If LFTs continue daily improvement, would consider not pursing liver biopsy.  However, if LFTs plateau, then proceed with liver biopsy assuming platelets OK and otherwise clinically appropriate.  Encouraged diet.  Patient describes history of dysphagia associated with what she describes as a too tight wrap for correction of hiatal hernia, for which she is followed by Dr. Storm at [facility].  She will f/u with him as outpatient for this, no inpatient w/u required for this.         Quality-Core  Measures   Reviewed items::  Labs reviewed, Medications reviewed and Radiology images reviewed

## 2021-07-19 NOTE — DISCHARGE PLANNING
Anticipated Discharge Disposition: TBD    Action: pt pending medical clearance, [facility] was following, however due to low platelets pt declined. Pts platelets now above 70, if can sustain platelet levels will need to refer back to [facility]    Barriers to Discharge: medical clearance, sustained platelets above 50.    Plan: f/u with medical team and pt to discuss dc needs and barriers.

## 2021-07-19 NOTE — CARE PLAN
The patient is Stable - Low risk of patient condition declining or worsening    Shift Goals  Clinical Goals: monitor labs, monitor uop  Patient Goals: rest and comfort    Progress made toward(s) clinical / shift goals:    Problem: Knowledge Deficit - Standard  Goal: Patient and family/care givers will demonstrate understanding of plan of care, disease process/condition, diagnostic tests and medications  Outcome: Progressing     Problem: Fall Risk  Goal: Patient will remain free from falls  Outcome: Progressing     Problem: Skin Integrity  Goal: Skin integrity is maintained or improved  Outcome: Progressing     Problem: Pain - Standard  Goal: Alleviation of pain or a reduction in pain to the patient’s comfort goal  Outcome: Progressing       Patient is not progressing towards the following goals:

## 2021-07-19 NOTE — PROGRESS NOTES
4 Eyes Skin Assessment Completed by AILYN Jameson and AILYN Berrios.     Head Jaundice  Ears Blanching  Nose Blanching  Mouth Bleeding  Neck Blanching, Bruising and Jaundice  Breast/Chest Bruising, Jaundice and Edema  Shoulder Blades Blanching, bruising   Spine Blanching, bruising  (R) Arm/Elbow/Hand Blanching, Bruising, Abrasion, Swelling, Weeping and Edema  (L) Arm/Elbow/Hand Blanching, Bruising, Abrasion, Scab, Weeping and Edema  Abdomen Blanching, Scab and Bruising  Groin Redness, Blanching, Excoriation, Rash and Swelling  Scrotum/Coccyx/Buttocks Redness, Blanching, Excoriation and Moisture Fissure  (R) Leg Blanching, Bruising, Swelling, Weeping and Edema  (L) Leg Blanching, Bruising, Swelling, Weeping and Edema  (R) Heel/Foot/Toe Redness, Blanching, Boggy, Swelling and Edema  (L) Heel/Foot/Toe Redness, Blanching, Boggy, Bruising, Swelling, Scab and Edema              Devices In Places Tele Box, Central Line, indwelling catheter        Interventions In Place Gray Ear Foams, Heel Mepilex, Waffle Overlay, Pillows, Q2 Turns, Barrier Cream, Dri-Jack Pads, Heels Loaded W/Pillows and Pressure Redistribution Mattress     Possible Skin Injury No     Pictures Uploaded Into Epic N/A  Wound Consult Placed N/A, wound following already  RN Wound Prevention Protocol Ordered Yes

## 2021-07-20 LAB
A1AT SERPL-MCNC: 137 MG/DL (ref 90–200)
ALBUMIN SERPL BCP-MCNC: 2.5 G/DL (ref 3.2–4.9)
ALBUMIN/GLOB SERPL: 1.5 G/DL
ALP SERPL-CCNC: 359 U/L (ref 30–99)
ALT SERPL-CCNC: 107 U/L (ref 2–50)
ANION GAP SERPL CALC-SCNC: 8 MMOL/L (ref 7–16)
AST SERPL-CCNC: 178 U/L (ref 12–45)
BILIRUB SERPL-MCNC: 8.1 MG/DL (ref 0.1–1.5)
BUN SERPL-MCNC: 11 MG/DL (ref 8–22)
CALCIUM SERPL-MCNC: 7.6 MG/DL (ref 8.5–10.5)
CHLORIDE SERPL-SCNC: 95 MMOL/L (ref 96–112)
CHLORIDE UR-SCNC: 48 MMOL/L
CO2 SERPL-SCNC: 40 MMOL/L (ref 20–33)
CREAT SERPL-MCNC: 0.43 MG/DL (ref 0.5–1.4)
ERYTHROCYTE [DISTWIDTH] IN BLOOD BY AUTOMATED COUNT: 89.2 FL (ref 35.9–50)
GLOBULIN SER CALC-MCNC: 1.7 G/DL (ref 1.9–3.5)
GLUCOSE SERPL-MCNC: 106 MG/DL (ref 65–99)
HCT VFR BLD AUTO: 26.8 % (ref 37–47)
HGB BLD-MCNC: 8.4 G/DL (ref 12–16)
MAGNESIUM SERPL-MCNC: 2 MG/DL (ref 1.5–2.5)
MCH RBC QN AUTO: 30.8 PG (ref 27–33)
MCHC RBC AUTO-ENTMCNC: 31.3 G/DL (ref 33.6–35)
MCV RBC AUTO: 98.2 FL (ref 81.4–97.8)
PLATELET # BLD AUTO: 68 K/UL (ref 164–446)
POTASSIUM SERPL-SCNC: 3.8 MMOL/L (ref 3.6–5.5)
POTASSIUM UR-SCNC: 83 MMOL/L
PROT SERPL-MCNC: 4.2 G/DL (ref 6–8.2)
RBC # BLD AUTO: 2.73 M/UL (ref 4.2–5.4)
SODIUM SERPL-SCNC: 143 MMOL/L (ref 135–145)
SODIUM UR-SCNC: 38 MMOL/L
WBC # BLD AUTO: 8.5 K/UL (ref 4.8–10.8)

## 2021-07-20 PROCEDURE — A9270 NON-COVERED ITEM OR SERVICE: HCPCS | Performed by: STUDENT IN AN ORGANIZED HEALTH CARE EDUCATION/TRAINING PROGRAM

## 2021-07-20 PROCEDURE — A9270 NON-COVERED ITEM OR SERVICE: HCPCS | Performed by: FAMILY MEDICINE

## 2021-07-20 PROCEDURE — A9270 NON-COVERED ITEM OR SERVICE: HCPCS | Performed by: INTERNAL MEDICINE

## 2021-07-20 PROCEDURE — 700102 HCHG RX REV CODE 250 W/ 637 OVERRIDE(OP): Performed by: FAMILY MEDICINE

## 2021-07-20 PROCEDURE — 83735 ASSAY OF MAGNESIUM: CPT

## 2021-07-20 PROCEDURE — 700102 HCHG RX REV CODE 250 W/ 637 OVERRIDE(OP): Performed by: INTERNAL MEDICINE

## 2021-07-20 PROCEDURE — 700111 HCHG RX REV CODE 636 W/ 250 OVERRIDE (IP): Performed by: STUDENT IN AN ORGANIZED HEALTH CARE EDUCATION/TRAINING PROGRAM

## 2021-07-20 PROCEDURE — 99232 SBSQ HOSP IP/OBS MODERATE 35: CPT | Mod: GC | Performed by: HOSPITALIST

## 2021-07-20 PROCEDURE — 700111 HCHG RX REV CODE 636 W/ 250 OVERRIDE (IP): Performed by: INTERNAL MEDICINE

## 2021-07-20 PROCEDURE — 80053 COMPREHEN METABOLIC PANEL: CPT

## 2021-07-20 PROCEDURE — 700102 HCHG RX REV CODE 250 W/ 637 OVERRIDE(OP): Performed by: STUDENT IN AN ORGANIZED HEALTH CARE EDUCATION/TRAINING PROGRAM

## 2021-07-20 PROCEDURE — 770020 HCHG ROOM/CARE - TELE (206)

## 2021-07-20 PROCEDURE — 85027 COMPLETE CBC AUTOMATED: CPT

## 2021-07-20 RX ADMIN — POTASSIUM CHLORIDE 10 MEQ: 10 INJECTION, SOLUTION INTRAVENOUS at 02:00

## 2021-07-20 RX ADMIN — Medication 2000 UNITS: at 04:35

## 2021-07-20 RX ADMIN — POTASSIUM CHLORIDE 40 MEQ: 1500 TABLET, EXTENDED RELEASE ORAL at 17:27

## 2021-07-20 RX ADMIN — FENTANYL CITRATE 50 MCG: 50 INJECTION INTRAMUSCULAR; INTRAVENOUS at 21:29

## 2021-07-20 RX ADMIN — GABAPENTIN 400 MG: 400 CAPSULE ORAL at 17:26

## 2021-07-20 RX ADMIN — OMEPRAZOLE 40 MG: 20 CAPSULE, DELAYED RELEASE ORAL at 04:35

## 2021-07-20 RX ADMIN — FLUDROCORTISONE ACETATE 0.1 MG: 0.1 TABLET ORAL at 17:26

## 2021-07-20 RX ADMIN — HYDROCORTISONE 10 MG: 20 TABLET ORAL at 17:26

## 2021-07-20 RX ADMIN — OMEPRAZOLE 40 MG: 20 CAPSULE, DELAYED RELEASE ORAL at 17:26

## 2021-07-20 RX ADMIN — POTASSIUM CHLORIDE 10 MEQ: 10 INJECTION, SOLUTION INTRAVENOUS at 01:00

## 2021-07-20 RX ADMIN — AMITRIPTYLINE HYDROCHLORIDE 10 MG: 10 TABLET, FILM COATED ORAL at 04:35

## 2021-07-20 RX ADMIN — COLESEVELAM HYDROCHLORIDE 625 MG: 625 TABLET, COATED ORAL at 20:54

## 2021-07-20 RX ADMIN — FENTANYL CITRATE 50 MCG: 50 INJECTION INTRAMUSCULAR; INTRAVENOUS at 15:27

## 2021-07-20 RX ADMIN — LEVOTHYROXINE SODIUM 75 MCG: 0.07 TABLET ORAL at 04:36

## 2021-07-20 RX ADMIN — COLESEVELAM HYDROCHLORIDE 625 MG: 625 TABLET, COATED ORAL at 07:55

## 2021-07-20 RX ADMIN — FLUDROCORTISONE ACETATE 0.1 MG: 0.1 TABLET ORAL at 04:35

## 2021-07-20 RX ADMIN — POTASSIUM CHLORIDE 10 MEQ: 10 INJECTION, SOLUTION INTRAVENOUS at 00:00

## 2021-07-20 RX ADMIN — Medication 400 MG: at 05:43

## 2021-07-20 RX ADMIN — LIOTHYRONINE SODIUM 25 MCG: 25 TABLET ORAL at 20:54

## 2021-07-20 RX ADMIN — DULOXETINE HYDROCHLORIDE 60 MG: 60 CAPSULE, DELAYED RELEASE ORAL at 20:54

## 2021-07-20 RX ADMIN — POTASSIUM CHLORIDE 40 MEQ: 1500 TABLET, EXTENDED RELEASE ORAL at 04:36

## 2021-07-20 RX ADMIN — HYDROCORTISONE 20 MG: 20 TABLET ORAL at 04:35

## 2021-07-20 RX ADMIN — CALCITONIN SALMON 200 UNITS: 200 SPRAY, METERED NASAL at 21:29

## 2021-07-20 RX ADMIN — COLESEVELAM HYDROCHLORIDE 625 MG: 625 TABLET, COATED ORAL at 14:01

## 2021-07-20 ASSESSMENT — FIBROSIS 4 INDEX: FIB4 SCORE: 14.42

## 2021-07-20 ASSESSMENT — ENCOUNTER SYMPTOMS
RESPIRATORY NEGATIVE: 1
ABDOMINAL PAIN: 1
SHORTNESS OF BREATH: 0
CHILLS: 0
NAUSEA: 0
CARDIOVASCULAR NEGATIVE: 1
PALPITATIONS: 0
COUGH: 0
VOMITING: 0
BLURRED VISION: 0
GASTROINTESTINAL NEGATIVE: 1
DOUBLE VISION: 0
FEVER: 0

## 2021-07-20 ASSESSMENT — PAIN DESCRIPTION - PAIN TYPE
TYPE: ACUTE PAIN
TYPE: ACUTE PAIN

## 2021-07-20 NOTE — CARE PLAN
The patient is Watcher - Medium risk of patient condition declining or worsening    Shift Goals  Clinical Goals: pain control, monitor labs  Patient Goals: rest and pain control    Progress made toward(s) clinical / shift goals:    Problem: Knowledge Deficit - Standard  Goal: Patient and family/care givers will demonstrate understanding of plan of care, disease process/condition, diagnostic tests and medications  Outcome: Progressing     Problem: Fall Risk  Goal: Patient will remain free from falls  Outcome: Progressing     Problem: Skin Integrity  Goal: Skin integrity is maintained or improved  Outcome: Progressing     Problem: Pain - Standard  Goal: Alleviation of pain or a reduction in pain to the patient’s comfort goal  Outcome: Progressing       Patient is not progressing towards the following goals:

## 2021-07-20 NOTE — ASSESSMENT & PLAN NOTE
Continues to improve.  Initially there was a cholestatic liver pattern, w/ mild elevations of AST and ALT, which have now peaked and are trending down.  Alk phos has peaked as well and is trending down.  This elevation in liver enzymes is likely 2/2 drug induced liver injury.  MRCP on 6/17 shows no concern for cholestasis at this time.  Hepatitis A, B, C have been non-reactive, HIV negative, AMA, KINZA negative, celiac screen negative, HSV IgG elevated, EBV IgG elevated, CMV IgG negative, alpha 1 antitrypsin negative.  RUQ US w/ elastography shows no concern for cirrhosis.  -Per GI, continue to follow LFTs - if daily improvement is not observed, please call GI back and will pursue liver biopsy at that time

## 2021-07-20 NOTE — PROGRESS NOTES
Cross Coverage Note:  Paged by Nurse at 21:19 regarding hypotension, altered mentation, and mild confusion. Went to bedside to assess patient; patient states that she feels mentally slow/off.  Patient A&O x4, with self correction for location, initially stating she was in [place], [place] versus [place], [place], and correction on the year initially stating it is 2020 before correcting to 2021.  Patient denies having any nausea, vomiting, chest pain, shortness of breath, palpitations, dizziness, or headache.  Patient does endorse having abdominal pain in the left and right upper quadrants, which is remained unchanged.  She denies having any difficulty with using the bathroom.  On physical exam scleral icterus present, EOMI, PERRLA, mucous membranes profoundly dry, abdomen soft, mildly tender to palpation of RUQ and LUQ, heart rate RRR, lungs CTAB, no wheezes, rales, or rhonchi appreciated; patient has anasarca with 2-3+ pitting edema all extremities.  Blood pressure 82/49, patient breathing comfortably, and heart rate within normal limits.  Repeat CBC with differential, and CMP ordered; urinalysis, blood cultures, ABG also ordered.  All antihypertensives and diuretics will be held, day team to reassess in a.m.  Hypotension most likely secondary to overdiuresis and intravascular volume depletion, therefore will give 250 cc bolus, if patient tolerates consider repeat 500 cc bolus and reassess in a.m.      Mary Leigh M.D.  PGY2 Internal Medicine

## 2021-07-20 NOTE — ASSESSMENT & PLAN NOTE
CTA of chest done on 4/27/2021 showed small left lower lobe subsegmental PE.  However repeat CTA on 5/10/2021 showed resolution.  Patient supposed to be on Eliquis for 3-month. Per vascular medicine she was not recommended to continue. Discontinued

## 2021-07-20 NOTE — ASSESSMENT & PLAN NOTE
Stable.  New MARY LOU on 7/19.  Cr on 7/17 was 0.17, elevated up to 0.5 on 7/19.  This meets KDIGO criteria for elevation of 0.3 w/in 48 hours or 1.5x elevation over 7 days.  This is likely a prerenal process in the setting of decreased intravascular volume during diuresis.  Pt also w/ metabolic alkalosis, which is likely due to hypokalemia.  -AM CMP, continue to monitor

## 2021-07-20 NOTE — PROGRESS NOTES
2 RN SKIN CHECK COMPLETED      Head Jaundice  Ears Blanching no longer requiring supplemental O2   Nose Blanching  Neck Blanching, Bruising and Jaundice  Breast/Chest Bruising, Jaundice and Edema  Shoulder Blades Blanching, bruising   Spine Blanching, bruising  (R) Arm/Elbow/Hand Blanching, Bruising, Abrasion, Swelling, Weeping and Edema  (L) Arm/Elbow/Hand Blanching, Bruising, Abrasion, Scab, Weeping and Edema  Abdomen Blanching, Scab and Bruising  Groin Redness, Blanching, Excoriation, Rash and Swelling  Scrotum/Coccyx/Buttocks Redness, slow to  Harley, reyna in place, barrier cream used. Excoriation and Moisture Fissure  (R) Leg Blanching, Bruising, Swelling, Weeping and Edema  (L) Leg Blanching, Bruising, Swelling, Weeping and Edema  (R) Heel/Foot/Toe Redness, Blanching, Boggy, Swelling and Edema  (L) Heel/Foot/Toe Redness, Blanching, Boggy, Bruising, Swelling, Scab and Edema              Devices In Places Tele Box, Central Line, indwelling catheter        Interventions In Place Heel Mepilex, Waffle Overlay, Pillows, Q2 Turns, frequent linen changes to ensure pt stays dry, Barrier Cream, Dri-Jack Pads, Heels Loaded W/Pillows and Pressure Redistribution Mattress

## 2021-07-20 NOTE — ASSESSMENT & PLAN NOTE
Resolved. Etiology still unknown at this time.  Extensive lab work-up has been used to rule-out many common causes.  Current suspected etiology is drug induced liver injury leading to thrombocytopenia, as she was on multiple medications and frequently in the hospital requiring the use of abx, recently requiring vanc and meropenem on 7/11.  In some case reports, DOACs have also contributed to thrombocytopenia, as well as has lasix.  ITP is unlikely, as she has had a good response to platelets during this admission.  DIC unlikely as she has never had a markedly decrease in fibrinogen or schistocytes seen on smear.  MRCP on 7/16 shows no intra or extrahepatic dilatation that would be concerning for cholestasis or obstruction.  -Consider Liver Bx pending recovery of platelets, per GI don't need bx as long as LFTs continue to resolve  -AM CBC  -Transfuse plt if <20k

## 2021-07-20 NOTE — ASSESSMENT & PLAN NOTE
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  The continuation of these events while lying in bed is potentially due to receiving stress dose steroids earlier in the admission w/o enough of a taper for her chronic adrenal insufficiency.  There is some concern that these episodes of passing out could also be seizures, as pt has a hx of seizure.  She hasn't taken any antiepileptics since 2009 and hasn't noticed any issues with seizures, she hasn't been followed by neurology since then, making this highly unlikely.  While working w/ PT on 7/21, she had an episode while trying to stand, orthostatic blood pressures at that time were normal for laying and sitting, but w/ an episode of syncope this would be considered positive orthostatic hypotension. Some hypertensive BP in the early AM, above 180 SBP, which could be related to positional hypertension while laying down, which has been persistent since increase in fludrocortisone.    -Carvedilol decreased to 6.25 mg PO BID  -Fludrocortisone to 0.1 PO BID  -Hydrocortisone 20 mg in AM, 10 mg in PM daily

## 2021-07-20 NOTE — PROGRESS NOTES
Bedside report received. Per night RN pt became hypotensive with increase confusion, Rapid response called. Patient is currently  A&O x 4. VS'S.  Pt placed on , titrated off supplemental O2 now RA. NSR on telemetry monitor, SB while sleeping. No complaints of pain or SOB at this time. Pt does have generalized edema in all extremities. Central line in place, MD wants to keep for now. Perez in place due to significant skin breakdown over groin and sacrum. POC discussed with patient. Pt verbalizes understanding. Call light and belongings with in reach. Bed locked and in lowest position, alarm and fall precautions in place.

## 2021-07-20 NOTE — PROGRESS NOTES
Gastroenterology Progress Note     Author: Jamel Oden M.D.   Date & Time Created: 7/20/2021 11:01 AM    Chief Complaint:  hepatitis    Interval History:  Feeling better, improved appetite.    Review of Systems:  Review of Systems   Respiratory: Negative.    Cardiovascular: Negative.    Gastrointestinal: Negative.        Physical Exam:  Physical Exam  Cardiovascular:      Pulses: Normal pulses.   Pulmonary:      Effort: Pulmonary effort is normal.         Labs:  Recent Labs     07/19/21 2206   LLPRO71J 7.60*   [id number] 38.9*   [id number] 99.6*   [id number] 96.5   ARTHCO3 37*   ARTBE 14*         Recent Labs     07/19/21  1250 07/19/21 2129 07/20/21  0410   SODIUM 142 139 143   POTASSIUM 2.2* 3.2* 3.8   CHLORIDE 91* 90* 95*   CO2 37* 38* 40*   BUN 9 11 11   CREATININE 0.34* 0.50 0.43*   MAGNESIUM 1.2* 2.2 2.0   CALCIUM 7.6* 7.4* 7.6*     Recent Labs     07/18/21  1150 07/18/21  1150 07/19/21  0525 07/19/21  0525 07/19/21  1250 07/19/21 2129 07/20/21  0410   ALTSGPT 99*  --  100*  --   --   --  107*   ASTSGOT 207*  --  189*  --   --   --  178*   ALKPHOSPHAT 454*  --  419*  --   --   --  359*   TBILIRUBIN 13.1*  --  10.1*  --   --   --  8.1*   GLUCOSE 175*   < > 86   < > 143* 140* 106*    < > = values in this interval not displayed.     Recent Labs     07/19/21  0525 07/19/21 2226 07/20/21  0410   RBC 2.85* 2.42* 2.73*   HEMOGLOBIN 8.6* 7.5* 8.4*   HEMATOCRIT 27.2* 24.0* 26.8*   PLATELETCT 73* 57* 68*     Recent Labs     07/18/21  1150 07/18/21  1150 07/19/21  0525 07/19/21 2226 07/20/21  0410   WBC 10.2   < > 9.6 7.6 8.5   NEUTSPOLYS 72.90*  --  62.60 73.30*  --    LYMPHOCYTES 8.40*  --  18.50* 13.00*  --    MONOCYTES 7.50  --  14.00* 11.40  --    EOSINOPHILS 0.00  --  0.00 0.00  --    BASOPHILS 0.00  --  0.20 0.30  --    ASTSGOT 207*  --  189*  --  178*   ALTSGPT 99*  --  100*  --  107*   ALKPHOSPHAT 454*  --  419*  --  359*   TBILIRUBIN 13.1*  --  10.1*  --  8.1*    < > = values in this interval not  displayed.     Hemodynamics:  Temp (24hrs), Av.5 °C (97.7 °F), Min:36.2 °C (97.1 °F), Max:36.9 °C (98.5 °F)  Temperature: 36.4 °C (97.5 °F)  Pulse  Av.9  Min: 54  Max: 114   Blood Pressure: 116/60     Respiratory:    Respiration: 18, Pulse Oximetry: 97 %        RUL Breath Sounds: Clear, RML Breath Sounds: Clear, RLL Breath Sounds: Diminished, TRISTAN Breath Sounds: Clear, LLL Breath Sounds: Diminished  Fluids:    Intake/Output Summary (Last 24 hours) at 2021 1101  Last data filed at 2021 2343  Gross per 24 hour   Intake --   Output 2050 ml   Net -2050 ml     Weight: 102 kg (224 lb 6.9 oz)  GI/Nutrition:  Orders Placed This Encounter   Procedures   • Diet Order Diet: Cardiac     Standing Status:   Standing     Number of Occurrences:   1     Order Specific Question:   Diet:     Answer:   Cardiac [6]     Medical Decision Making, by Problem:  Active Hospital Problems    Diagnosis    • *Thrombocytopenia (HCC) [D69.6]    • Anasarca [R60.1]    • Elevated liver enzymes [R74.8]    • Lower GI bleed [K92.2]    • Recurrent syncope [R55]    • History of pulmonary embolus (PE) [Z86.711]    • Adrenal insufficiency (Valentín's disease) (HCC) [E27.1]    • Hypothyroidism [E03.9]    • Hypokalemia [E87.6]        Plan:  Severe hepatitis, continued daily improvement with conservative/supportive management.   At this time, would not recommend aggressive w/u, ie liver biopsy.  Continue to follow LFTs - if daily improvement is not observed, please call GI back and will pursue liver biopsy at that time.  Otherwise, will s/o for now, and plan outpatient f/u.  Please call if needed.  Quality-Core Measures   Reviewed items::  Medications reviewed and Labs reviewed

## 2021-07-20 NOTE — CARE PLAN
Problem: Nutritional:  Goal: Achieve adequate nutritional intake  Description: Patient will consume >50% of meals  Outcome: Met     PO intake generally >50%. Brief visit with pt, reported variable appetite. Food preferences discussed, kitchen notified.     RD to follow weekly, available PRN.

## 2021-07-20 NOTE — ASSESSMENT & PLAN NOTE
Pt anasarcic with pitting edema throughout both LE and in the UE as well.  This is likely multifactorial w/ components of heart failure leading to gut edema and acute exacerbation, adrenal insufficiency leading to electrolyte imbalances and liver disease all playing roles into it's development. Anasarca improving, will decrease diuresis to maintenance dose now and monitor K.  -Bumex 0.5 mg QD  -40 mEq Potassium PO BID  -replete K and Mg as necessary  -Encourage ambulating from bed to chair as tolerated.  -Continue PT/OT treatment

## 2021-07-20 NOTE — ASSESSMENT & PLAN NOTE
Improving, will continue to monitor as transition to maintenance dose of bumex.  -Repeat CMP AM  -Replete K and Mg as necessary

## 2021-07-20 NOTE — PROGRESS NOTES
Daily Progress Note:     Date of Service: 7/20/2021  Primary Team: [facility] Blue Team   Attending: Edson Santizo M.D.   Senior Resident: Dr. Villegas  Intern: Dr. Jones  Contact:  [phone]    Chief Complaint:   Abdominal pain    Subjective:  Overnight events:  Last evening she had an episode of confusion overnight, which she states she did notice that she was feeling a little confused.  UA was obtained, blood cx were drawn and she was found to be mildly hypotensive and she received 750 ml bolus at that time.  An ABG was acquired at that time which confirmed metabolic alkalosis.      This AM she is reporting that she's feeling much better than she did the night before.  She reports that she had an episode of syncope yesterday while laying in bed that occurred while she was signing paperwork w/ her .  She had a bowel movement yesterday.  She had subway for dinner, which made her feel a little nauseous but she didn't vomit.  She has continued to have upper abdominal pain, which is consistent with yesterday and still having episodes of sharp pains in her RUQ and LUQ.  This morning BP have remained low, but are now trending up this afternoon.      Consultants/Specialty:  General Surgery - [facility]  GI - [facility]  ID- [facility]    Review of Systems:    Review of Systems   Constitutional: Negative for chills and fever.   Eyes: Negative for blurred vision and double vision.   Respiratory: Negative for cough and shortness of breath.    Cardiovascular: Negative for chest pain and palpitations.   Gastrointestinal: Positive for abdominal pain. Negative for nausea and vomiting.       Objective Data:   Physical Exam:   Vitals:   Temp:  [36.2 °C (97.1 °F)-36.9 °C (98.5 °F)] 36.7 °C (98 °F)  Pulse:  [60-86] 71  Resp:  [13-18] 18  BP: ()/(44-88) 136/88  SpO2:  [88 %-98 %] 94 %    Physical Exam  Constitutional:       General: She is not in acute distress.     Appearance: She is not toxic-appearing.   HENT:      Head:  Normocephalic and atraumatic.      Mouth/Throat:      Mouth: Mucous membranes are moist.      Pharynx: Oropharynx is clear.   Eyes:      General: Scleral icterus present.      Extraocular Movements: Extraocular movements intact.   Cardiovascular:      Rate and Rhythm: Normal rate and regular rhythm.      Pulses: Normal pulses.      Heart sounds: Normal heart sounds. No murmur heard.   No friction rub. No gallop.    Pulmonary:      Effort: Pulmonary effort is normal. No respiratory distress.      Breath sounds: Normal breath sounds. No wheezing, rhonchi or rales.   Abdominal:      General: Abdomen is flat. Bowel sounds are normal. There is no distension.      Palpations: Abdomen is soft.      Tenderness: There is abdominal tenderness (RUQ>LUQ). There is no guarding or rebound.   Musculoskeletal:         General: Swelling present.      Right lower leg: Edema present.      Left lower leg: Edema present.      Comments: 3+ pitting edema in B/l UE and LE, improving today.   Skin:     General: Skin is warm and dry.      Coloration: Skin is jaundiced (improving).   Neurological:      General: No focal deficit present.      Mental Status: She is alert.      Comments: No asterixis noted.   Psychiatric:         Mood and Affect: Mood normal.         Behavior: Behavior normal.           Labs:   HEMATOLOGY/ ONCOLOGY/ID:            Recent Labs     07/17/21  2149 07/17/21  2149 07/18/21  1150 07/18/21  1150 07/19/21  0525 07/19/21  2226 07/20/21  0410   WBC 9.6   < > 10.2   < > 9.6 7.6 8.5   RBC 2.66*   < > 2.73*   < > 2.85* 2.42* 2.73*   HEMOGLOBIN 8.0*   < > 8.3*   < > 8.6* 7.5* 8.4*   HEMATOCRIT 24.9*   < > 25.9*   < > 27.2* 24.0* 26.8*   MCV 93.6   < > 94.9   < > 95.4 99.2* 98.2*   MCH 30.1   < > 30.4   < > 30.2 31.0 30.8   RDW 62.8*   < > 64.7*   < > 79.5* 90.1* 89.2*   PLATELETCT 35*   < > 40*   < > 73* 57* 68*   NEUTSPOLYS 75.90*   < > 72.90*  --  62.60 73.30*  --    LYMPHOCYTES 4.50*   < > 8.40*  --  18.50* 13.00*  --     MONOCYTES 6.20   < > 7.50  --  14.00* 11.40  --    EOSINOPHILS 0.00   < > 0.00  --  0.00 0.00  --    BASOPHILS 0.90   < > 0.00  --  0.20 0.30  --    RBCMORPHOLO Present  --  Present  --   --  Present  --     < > = values in this interval not displayed.     Lab Results   Component Value Date    NRYJVRMU75 3701 (H) 07/09/2021    XIMDTCNS87 528 09/18/2018    FOLATE 8.0 07/16/2021    FERRITIN 383.0 (H) 07/13/2021    FERRITIN 136.0 07/09/2021    FERRITIN 94.1 06/30/2021    IRON 106 07/13/2021    IRON 113 07/09/2021    IRON 136 06/30/2021    TOTIRONBC 123 (L) 07/13/2021    TOTIRONBC 130 (L) 07/09/2021    TOTIRONBC 153 (L) 06/30/2021       RENAL:        Estimated GFR/CRCL = Estimated Creatinine Clearance: 167.7 mL/min (A) (by C-G formula based on SCr of 0.43 mg/dL (L)).  Recent Labs     07/18/21  1150 07/18/21  1150 07/19/21  0525 07/19/21  0525 07/19/21  1250 07/19/21  2129 07/20/21  0410   SODIUM 139   < > 142   < > 142 139 143   POTASSIUM 2.4*   < > 2.2*   < > 2.2* 3.2* 3.8   CHLORIDE 93*   < > 93*   < > 91* 90* 95*   CO2 35*   < > 40*   < > 37* 38* 40*   GLUCOSE 175*   < > 86   < > 143* 140* 106*   BUN 10   < > 10   < > 9 11 11   CREATININE 0.24*   < > 0.22*   < > 0.34* 0.50 0.43*   CALCIUM 7.9*   < > 7.8*   < > 7.6* 7.4* 7.6*   MAGNESIUM  --   --   --   --  1.2* 2.2 2.0   ALBUMIN 2.7*  --  2.6*  --   --   --  2.5*    < > = values in this interval not displayed.       GASTROINTESTINAL/ HEPATIC:          Recent Labs     07/17/21  2149 07/18/21  1150 07/19/21  0525 07/19/21 2226 07/20/21  0410   ALTSGPT  --  99* 100*  --  107*   ASTSGOT  --  207* 189*  --  178*   ALKPHOSPHAT  --  454* 419*  --  359*   TBILIRUBIN  --  13.1* 10.1*  --  8.1*   ALBUMIN  --  2.7* 2.6*  --  2.5*   GLOBULIN  --  1.5* 1.6*  --  1.7*   MACROCYTOSIS 1+ 2+*  --  2+*  --      Lab Results   Component Value Date    AMMONIA 31 07/19/2021       ENDOCRINE:              Recent Labs     07/19/21  1250 07/19/21 2129 07/19/21 2130 07/20/21  0410    GLUCOSE 143* 140*  --  106*   POCGLUCOSE  --   --  143*  --      Lab Results   Component Value Date    HBA1C 6.0 (H) 06/29/2021    HBA1C 6.0 (H) 04/27/2021    HBA1C 6.0 (H) 06/19/2020    FREET4 1.27 06/29/2021    FREET4 1.40 06/16/2021    FREET4 1.16 06/15/2021    FREET3 2.46 05/12/2021    FREET3 3.10 12/09/2019    FREET3 4.28 (H) 10/18/2019    CORTISOL >63.4 (H) 06/22/2021    CORTISOL 5.1 06/17/2021    CORTISOL 2.7 05/25/2021       Imaging:   No new imaging today    Problem Representation:  Donna Isaac  is a 56 y.o. y/o woman w/ PMHx DVT on eliquis, viral's dz w/ recurrent syncope, diverticulosis, HTN, hypothyroid, MI who presented on 7/9/2021 w/ hematochezia, new thrombocytopenia with decreased hemoglobin and concerns for DIC of unknown etiology vs liver pathology leading to thrombocytopenia.     * Thrombocytopenia (HCC)- (present on admission)  Assessment & Plan  Pt w/ new onset thrombocytopenia on admission.  Initially thought to be DIC, but no active infection or suspicion for malignancy, peripheral smear w/o schistocytes.  Plt ab negative, no cirrhosis on elastography, no splenomegaly, B12, folate normal.  Etiology at this time is unknown, but leading ddx would include decreased production from bone marrow infiltration, but in the setting of no known malignancy, including lymphoma/leukemia this is less likely.  Unlikely to be autoimmune process such as ITP or drug autoimmune as platelets have remained stable following administration of platelets.  Other considerations would be drug induced liver injury leading to thrombocytopenia, she is also on multiple medications and frequently in the hospital requiring the use of abx, recently requiring vanc and meropenem on 7/11.  In some case reports, DOACs have also contributed to thrombocytopenia, as well as has lasix.  ITP is unlikely, as she has had a good response to platelets during this admission.  MRCP on 7/16 shows no intra or extrahepatic dilatation  that would be concerning for cholestasis or obstruction.  -Consider Liver Bx pending recovery of platelets, per GI don't need bx as long as LFTs continue to resolve  -Consider bone marrow bx  -AM CBC  -Rickettsia panel pending  -Transfuse plt if <20k      Elevated liver enzymes  Assessment & Plan  Initially there was a cholestatic liver pattern, w/ mild elevations of AST and ALT, which have now peaked and are trending down.  Alk phos has peaked as well and is trending down.  Although she is s/p cholecystectomy, there could have been some biliary sludge contributing to a cholestatic pattern.  MRCP on 6/17 shows no concern for cholestasis at this time.  Hepatitis A, B, C have been non-reactive, HIV negative, AMA, KINZA negative, celiac screen negative, HSV IgG elevated, EBV IgG elevated, CMV IgG negative, alpha 1 antitrypsin negative.  -Per GI, continue to follow LFTs - if daily improvement is not observed, please call GI back and will pursue liver biopsy at that time      Anasarca  Assessment & Plan  Pt anasarcic with pitting edema throughout the whole LE and in the UE as well.  This is likely multifactorial w/ a components of heart failure leading to gut edema and acute exacerbation, adrenal insufficiency leading to electrolyte imbalances and liver disease all playing roles.  -Bumex held today in the setting of hypotension, hypokalemia and MARY LOU.  -If resolution, will restart w/ bumetanide 2 mg BID tomorrow  -40 mEq Potassium PO BID while diuresing  -400 mg magnesium oxide PO QD while diuresing    Recurrent syncope- (present on admission)  Assessment & Plan  See adrenal insufficiency for A+P.    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  She notes that she has been having episodes of syncope more frequently lately, and continues to have them while in bed.  We have continued  her home meds here.  This will likely be an ongoing issue and will need further med titration outpatient to reduce the amount of syncopal events.  The continuation of these events while lying in bed is potentially due to receiving stress dose steroids earlier in the admission w/o enough of a taper for her chronic adrenal insufficiency.  Will continue to monitor for improvement.    -Fludrocortisone 0.1 PO BID  -Hydrocortisone 20 mg in AM, 10 mg in PM daily      Acute kidney injury (HCC)  Assessment & Plan  New MARY LOU on 7/19.  Cr on 7/17 was 0.17, elevated up to 0.5 on 7/19.  This meets KDIGO criteria for elevation of 0.3 w/in 48 hours or 1.5x elevation over 7 days.  This is likely a prerenal process in the setting of decreased intravascular volume during diuresis.  Pt also w/ metabolic alkalosis, which is likely due to hypokalemia.  -AM CMP  -Hold diuresis for today to allow for resolution.      Hypokalemia  Assessment & Plan  Pt w/ worsening hypokalemia during diuresis.  Attempted repletion w/ 80 mg IV today and 80 mg PO.  Hypokalemia is likely 2/2 to aggressive diuresis w/ bumex.  Hypokalemia will be rate limiting factor for diuresis.  Magnesium has also found to be low  -40 mEq Potassium PO TID during diuresis  -400 mg Magnesium oxide PO QD  -Repeat BMP at 1900  -Repeat BMP AM  -Repeat Mg AM    Lower GI bleed- (present on admission)  Assessment & Plan  3 episodes of hematochezia at rehab center, has resolved with no hematochezia or melena    History of pulmonary embolus (PE)- (present on admission)  Assessment & Plan  CTA of chest done on 4/27/2021 showed small left lower lobe subsegmental PE.  However repeat CTA on 5/10/2021 showed resolution.  Patient supposed to be on Eliquis for 3-month. Per vascular medicine she was not recommended to continue. Discontinued    Hypothyroidism- (present on admission)  Assessment & Plan  Hx of hypothyroidism.  Continue home Levothyroxine 75 mcg PO QD      Code Status: Full  DVT  ppx: CI in the setting of low platelets  Diet: Cardiac diet  GI: Bowel regimen  T/L/D: CVC left IJV, reyna catheter    Minh Jones DO  PGY-1, [employer] [facility]    Assessment and plan discussed with senior resident and attending physician.

## 2021-07-21 LAB
ALBUMIN SERPL BCP-MCNC: 2.8 G/DL (ref 3.2–4.9)
ALBUMIN/GLOB SERPL: 1.8 G/DL
ALP SERPL-CCNC: 351 U/L (ref 30–99)
ALT SERPL-CCNC: 115 U/L (ref 2–50)
ANION GAP SERPL CALC-SCNC: 11 MMOL/L (ref 7–16)
ANION GAP SERPL CALC-SCNC: 8 MMOL/L (ref 7–16)
ANISOCYTOSIS BLD QL SMEAR: ABNORMAL
AST SERPL-CCNC: 160 U/L (ref 12–45)
BASOPHILS # BLD AUTO: 0.9 % (ref 0–1.8)
BASOPHILS # BLD: 0.1 K/UL (ref 0–0.12)
BILIRUB SERPL-MCNC: 7.5 MG/DL (ref 0.1–1.5)
BUN SERPL-MCNC: 8 MG/DL (ref 8–22)
BUN SERPL-MCNC: 9 MG/DL (ref 8–22)
CALCIUM SERPL-MCNC: 8.3 MG/DL (ref 8.5–10.5)
CALCIUM SERPL-MCNC: 8.3 MG/DL (ref 8.5–10.5)
CHLORIDE SERPL-SCNC: 92 MMOL/L (ref 96–112)
CHLORIDE SERPL-SCNC: 97 MMOL/L (ref 96–112)
CO2 SERPL-SCNC: 35 MMOL/L (ref 20–33)
CO2 SERPL-SCNC: 37 MMOL/L (ref 20–33)
CREAT SERPL-MCNC: 0.38 MG/DL (ref 0.5–1.4)
CREAT SERPL-MCNC: 0.5 MG/DL (ref 0.5–1.4)
EOSINOPHIL # BLD AUTO: 0 K/UL (ref 0–0.51)
EOSINOPHIL NFR BLD: 0 % (ref 0–6.9)
ERYTHROCYTE [DISTWIDTH] IN BLOOD BY AUTOMATED COUNT: 95.2 FL (ref 35.9–50)
FIBRINOGEN PPP-MCNC: 242 MG/DL (ref 215–460)
GLOBULIN SER CALC-MCNC: 1.6 G/DL (ref 1.9–3.5)
GLUCOSE SERPL-MCNC: 90 MG/DL (ref 65–99)
GLUCOSE SERPL-MCNC: 97 MG/DL (ref 65–99)
HCT VFR BLD AUTO: 31 % (ref 37–47)
HGB BLD-MCNC: 9.7 G/DL (ref 12–16)
LYMPHOCYTES # BLD AUTO: 0.5 K/UL (ref 1–4.8)
LYMPHOCYTES NFR BLD: 4.4 % (ref 22–41)
MACROCYTES BLD QL SMEAR: ABNORMAL
MAGNESIUM SERPL-MCNC: 1.8 MG/DL (ref 1.5–2.5)
MANUAL DIFF BLD: NORMAL
MCH RBC QN AUTO: 31 PG (ref 27–33)
MCHC RBC AUTO-ENTMCNC: 31.3 G/DL (ref 33.6–35)
MCV RBC AUTO: 99 FL (ref 81.4–97.8)
MONOCYTES # BLD AUTO: 0.31 K/UL (ref 0–0.85)
MONOCYTES NFR BLD AUTO: 2.7 % (ref 0–13.4)
MORPHOLOGY BLD-IMP: NORMAL
NEUTROPHILS # BLD AUTO: 10.49 K/UL (ref 2–7.15)
NEUTROPHILS NFR BLD: 92 % (ref 44–72)
NRBC # BLD AUTO: 0.11 K/UL
NRBC BLD-RTO: 1 /100 WBC
PLATELET # BLD AUTO: 93 K/UL (ref 164–446)
PLATELET BLD QL SMEAR: NORMAL
POIKILOCYTOSIS BLD QL SMEAR: NORMAL
POLYCHROMASIA BLD QL SMEAR: NORMAL
POTASSIUM SERPL-SCNC: 2.5 MMOL/L (ref 3.6–5.5)
POTASSIUM SERPL-SCNC: 3.3 MMOL/L (ref 3.6–5.5)
PROT SERPL-MCNC: 4.4 G/DL (ref 6–8.2)
R RICKETTSI IGG TITR SER IF: NORMAL {TITER}
R RICKETTSI IGM TITR SER IF: NORMAL {TITER}
R TYPHI IGG TITR SER IF: NORMAL {TITER}
R TYPHI IGM TITR SER IF: NORMAL {TITER}
RBC # BLD AUTO: 3.13 M/UL (ref 4.2–5.4)
RBC BLD AUTO: PRESENT
SODIUM SERPL-SCNC: 138 MMOL/L (ref 135–145)
SODIUM SERPL-SCNC: 142 MMOL/L (ref 135–145)
TARGETS BLD QL SMEAR: NORMAL
WBC # BLD AUTO: 11.4 K/UL (ref 4.8–10.8)

## 2021-07-21 PROCEDURE — 80048 BASIC METABOLIC PNL TOTAL CA: CPT

## 2021-07-21 PROCEDURE — 700102 HCHG RX REV CODE 250 W/ 637 OVERRIDE(OP): Performed by: INTERNAL MEDICINE

## 2021-07-21 PROCEDURE — 700102 HCHG RX REV CODE 250 W/ 637 OVERRIDE(OP): Performed by: STUDENT IN AN ORGANIZED HEALTH CARE EDUCATION/TRAINING PROGRAM

## 2021-07-21 PROCEDURE — 770020 HCHG ROOM/CARE - TELE (206)

## 2021-07-21 PROCEDURE — 94760 N-INVAS EAR/PLS OXIMETRY 1: CPT

## 2021-07-21 PROCEDURE — 700111 HCHG RX REV CODE 636 W/ 250 OVERRIDE (IP): Performed by: STUDENT IN AN ORGANIZED HEALTH CARE EDUCATION/TRAINING PROGRAM

## 2021-07-21 PROCEDURE — A9270 NON-COVERED ITEM OR SERVICE: HCPCS | Performed by: STUDENT IN AN ORGANIZED HEALTH CARE EDUCATION/TRAINING PROGRAM

## 2021-07-21 PROCEDURE — 85027 COMPLETE CBC AUTOMATED: CPT

## 2021-07-21 PROCEDURE — 97164 PT RE-EVAL EST PLAN CARE: CPT

## 2021-07-21 PROCEDURE — 700111 HCHG RX REV CODE 636 W/ 250 OVERRIDE (IP): Performed by: INTERNAL MEDICINE

## 2021-07-21 PROCEDURE — 99232 SBSQ HOSP IP/OBS MODERATE 35: CPT | Mod: GC | Performed by: HOSPITALIST

## 2021-07-21 PROCEDURE — 700102 HCHG RX REV CODE 250 W/ 637 OVERRIDE(OP): Performed by: FAMILY MEDICINE

## 2021-07-21 PROCEDURE — A9270 NON-COVERED ITEM OR SERVICE: HCPCS | Performed by: FAMILY MEDICINE

## 2021-07-21 PROCEDURE — 80053 COMPREHEN METABOLIC PANEL: CPT

## 2021-07-21 PROCEDURE — 83735 ASSAY OF MAGNESIUM: CPT

## 2021-07-21 PROCEDURE — 85007 BL SMEAR W/DIFF WBC COUNT: CPT

## 2021-07-21 PROCEDURE — 85384 FIBRINOGEN ACTIVITY: CPT

## 2021-07-21 PROCEDURE — A9270 NON-COVERED ITEM OR SERVICE: HCPCS | Performed by: INTERNAL MEDICINE

## 2021-07-21 PROCEDURE — 97168 OT RE-EVAL EST PLAN CARE: CPT

## 2021-07-21 PROCEDURE — 83010 ASSAY OF HAPTOGLOBIN QUANT: CPT

## 2021-07-21 RX ORDER — POTASSIUM CHLORIDE 7.45 MG/ML
10 INJECTION INTRAVENOUS
Status: COMPLETED | OUTPATIENT
Start: 2021-07-21 | End: 2021-07-22

## 2021-07-21 RX ORDER — CARVEDILOL 6.25 MG/1
6.25 TABLET ORAL 2 TIMES DAILY WITH MEALS
Status: DISCONTINUED | OUTPATIENT
Start: 2021-07-21 | End: 2021-07-30 | Stop reason: HOSPADM

## 2021-07-21 RX ORDER — BUMETANIDE 1 MG/1
2 TABLET ORAL
Status: DISCONTINUED | OUTPATIENT
Start: 2021-07-21 | End: 2021-07-22

## 2021-07-21 RX ORDER — MAGNESIUM SULFATE HEPTAHYDRATE 40 MG/ML
2 INJECTION, SOLUTION INTRAVENOUS ONCE
Status: COMPLETED | OUTPATIENT
Start: 2021-07-21 | End: 2021-07-21

## 2021-07-21 RX ORDER — HEPARIN SODIUM 5000 [USP'U]/ML
5000 INJECTION, SOLUTION INTRAVENOUS; SUBCUTANEOUS EVERY 8 HOURS
Status: DISCONTINUED | OUTPATIENT
Start: 2021-07-21 | End: 2021-07-24

## 2021-07-21 RX ADMIN — COLESEVELAM HYDROCHLORIDE 625 MG: 625 TABLET, COATED ORAL at 20:24

## 2021-07-21 RX ADMIN — Medication 400 MG: at 04:43

## 2021-07-21 RX ADMIN — Medication 2000 UNITS: at 04:44

## 2021-07-21 RX ADMIN — COLESEVELAM HYDROCHLORIDE 625 MG: 625 TABLET, COATED ORAL at 09:32

## 2021-07-21 RX ADMIN — DULOXETINE HYDROCHLORIDE 60 MG: 60 CAPSULE, DELAYED RELEASE ORAL at 20:25

## 2021-07-21 RX ADMIN — LEVOTHYROXINE SODIUM 75 MCG: 0.07 TABLET ORAL at 04:43

## 2021-07-21 RX ADMIN — COLESEVELAM HYDROCHLORIDE 625 MG: 625 TABLET, COATED ORAL at 15:35

## 2021-07-21 RX ADMIN — FENTANYL CITRATE 50 MCG: 50 INJECTION INTRAMUSCULAR; INTRAVENOUS at 20:39

## 2021-07-21 RX ADMIN — GABAPENTIN 400 MG: 400 CAPSULE ORAL at 04:43

## 2021-07-21 RX ADMIN — GABAPENTIN 400 MG: 400 CAPSULE ORAL at 18:18

## 2021-07-21 RX ADMIN — HEPARIN SODIUM 5000 UNITS: 5000 INJECTION, SOLUTION INTRAVENOUS; SUBCUTANEOUS at 22:50

## 2021-07-21 RX ADMIN — LIOTHYRONINE SODIUM 25 MCG: 25 TABLET ORAL at 21:00

## 2021-07-21 RX ADMIN — FLUDROCORTISONE ACETATE 0.1 MG: 0.1 TABLET ORAL at 04:44

## 2021-07-21 RX ADMIN — OMEPRAZOLE 40 MG: 20 CAPSULE, DELAYED RELEASE ORAL at 04:44

## 2021-07-21 RX ADMIN — FENTANYL CITRATE 50 MCG: 50 INJECTION INTRAMUSCULAR; INTRAVENOUS at 11:42

## 2021-07-21 RX ADMIN — FENTANYL CITRATE 50 MCG: 50 INJECTION INTRAMUSCULAR; INTRAVENOUS at 14:49

## 2021-07-21 RX ADMIN — MAGNESIUM SULFATE 2 G: 2 INJECTION INTRAVENOUS at 05:33

## 2021-07-21 RX ADMIN — BUMETANIDE 2 MG: 1 TABLET ORAL at 15:35

## 2021-07-21 RX ADMIN — CARVEDILOL 6.25 MG: 6.25 TABLET, FILM COATED ORAL at 18:18

## 2021-07-21 RX ADMIN — POTASSIUM CHLORIDE 10 MEQ: 7.46 INJECTION, SOLUTION INTRAVENOUS at 22:16

## 2021-07-21 RX ADMIN — POTASSIUM CHLORIDE 10 MEQ: 7.46 INJECTION, SOLUTION INTRAVENOUS at 23:33

## 2021-07-21 RX ADMIN — FLUDROCORTISONE ACETATE 0.1 MG: 0.1 TABLET ORAL at 18:18

## 2021-07-21 RX ADMIN — POTASSIUM CHLORIDE 10 MEQ: 7.46 INJECTION, SOLUTION INTRAVENOUS at 20:25

## 2021-07-21 RX ADMIN — HYDROCORTISONE 10 MG: 20 TABLET ORAL at 18:18

## 2021-07-21 RX ADMIN — AMITRIPTYLINE HYDROCHLORIDE 10 MG: 10 TABLET, FILM COATED ORAL at 04:44

## 2021-07-21 RX ADMIN — HYDROCORTISONE 20 MG: 20 TABLET ORAL at 04:44

## 2021-07-21 RX ADMIN — BUMETANIDE 2 MG: 1 TABLET ORAL at 11:38

## 2021-07-21 RX ADMIN — OMEPRAZOLE 40 MG: 20 CAPSULE, DELAYED RELEASE ORAL at 18:18

## 2021-07-21 RX ADMIN — POTASSIUM CHLORIDE 40 MEQ: 1500 TABLET, EXTENDED RELEASE ORAL at 18:18

## 2021-07-21 RX ADMIN — FENTANYL CITRATE 50 MCG: 50 INJECTION INTRAMUSCULAR; INTRAVENOUS at 18:31

## 2021-07-21 RX ADMIN — HEPARIN SODIUM 5000 UNITS: 5000 INJECTION, SOLUTION INTRAVENOUS; SUBCUTANEOUS at 13:37

## 2021-07-21 RX ADMIN — POTASSIUM CHLORIDE 40 MEQ: 1500 TABLET, EXTENDED RELEASE ORAL at 04:44

## 2021-07-21 RX ADMIN — FENTANYL CITRATE 50 MCG: 50 INJECTION INTRAMUSCULAR; INTRAVENOUS at 22:49

## 2021-07-21 RX ADMIN — CALCITONIN SALMON 200 UNITS: 200 SPRAY, METERED NASAL at 20:25

## 2021-07-21 ASSESSMENT — COGNITIVE AND FUNCTIONAL STATUS - GENERAL
DAILY ACTIVITIY SCORE: 15
SUGGESTED CMS G CODE MODIFIER MOBILITY: CL
DRESSING REGULAR UPPER BODY CLOTHING: A LITTLE
TOILETING: A LOT
MOVING FROM LYING ON BACK TO SITTING ON SIDE OF FLAT BED: A LOT
MOVING TO AND FROM BED TO CHAIR: A LOT
MOBILITY SCORE: 14
EATING MEALS: A LITTLE
DRESSING REGULAR LOWER BODY CLOTHING: A LOT
STANDING UP FROM CHAIR USING ARMS: A LITTLE
CLIMB 3 TO 5 STEPS WITH RAILING: A LOT
PERSONAL GROOMING: A LITTLE
SUGGESTED CMS G CODE MODIFIER DAILY ACTIVITY: CK
WALKING IN HOSPITAL ROOM: A LITTLE
HELP NEEDED FOR BATHING: A LOT
TURNING FROM BACK TO SIDE WHILE IN FLAT BAD: A LOT

## 2021-07-21 ASSESSMENT — PAIN DESCRIPTION - PAIN TYPE
TYPE: ACUTE PAIN

## 2021-07-21 ASSESSMENT — ENCOUNTER SYMPTOMS
VOMITING: 0
NAUSEA: 0
CHILLS: 0
COUGH: 0
PALPITATIONS: 0
ABDOMINAL PAIN: 1
MYALGIAS: 0
SHORTNESS OF BREATH: 0
SPEECH CHANGE: 0
FEVER: 0
WEAKNESS: 1

## 2021-07-21 ASSESSMENT — GAIT ASSESSMENTS: GAIT LEVEL OF ASSIST: UNABLE TO PARTICIPATE

## 2021-07-21 ASSESSMENT — FIBROSIS 4 INDEX: FIB4 SCORE: 9.14

## 2021-07-21 NOTE — PROGRESS NOTES
Bedside report received from day RN, pt care assumed, assessment completed. Pt is A&O x4, pain denied, SR on the monitor. Updated on POC, questions answered. Bed in lowest, locked position, treaded socks on, call light and belongings within reach. Fall precautions in place.

## 2021-07-21 NOTE — DISCHARGE PLANNING
Care Transition Team Assessment    Emergency Contact: Spouse - Colin Isaac     Spoke to pt at bedside, pt confirmed all information on face sheet. Pt has a PCP whom she saw a month ago, uses [facility] on [address] and [address]. Pt has had [facility] in the past and does not use DME.    Information Source  Orientation Level: Oriented X4  Information Given By: Patient  Who is responsible for making decisions for patient? : Patient  Elopement Risk  Legal Hold: No  Ambulatory or Self Mobile in Wheelchair: No-Not an Elopement Risk  Disoriented: No  Psychiatric Symptoms: None  History of Wandering: No  Elopement this Admit: No  Vocalizing Wanting to Leave: No  Displays Behaviors, Body Language Wanting to Leave: No-Not at Risk for Elopement  Elopement Risk: Not at Risk for Elopement    Interdisciplinary Discharge Planning  Lives with - Patient's Self Care Capacity: Spouse  Patient or legal guardian wants to designate a caregiver: Yes  Caregiver name: Colin Isaac  Caregiver contact info: [phone]  Housing / Facility: 1 Story House (moving to a new, one story home)  Prior Services: Intermittent Physical Support for ADL Per Family    Discharge Preparedness  What is your plan after discharge?: Other (comment) ([facility])  What are your discharge supports?: Spouse    Vision / Hearing Impairment  Right Eye Vision: Impaired, Wears Glasses  Left Eye Vision: Impaired, Wears Glasses  Hearing Impairment: Right Ear, Hearing Device Not Available  Advance Directive  Advance Directive?: None  Advance Directive offered?: AD Booklet refused    Domestic Abuse  Have you ever been the victim of abuse or violence?: No  Physical Abuse or Sexual Abuse: No  Verbal Abuse or Emotional Abuse: No  Possible Abuse/Neglect Reported to:: Not Applicable

## 2021-07-21 NOTE — PROGRESS NOTES
Daily Progress Note:     Date of Service: 7/21/2021  Primary Team: [facility] Blue Team   Attending: Edson Santizo M.D.   Senior Resident: Dr. Villegas  Intern: Dr. Jones  Contact:  [phone]    Chief Complaint:   Abdominal pain    Subjective:  No acute events overnight.  This AM her abdominal pain is improving and she hasn't had any episodes of the pain sharply increasing like she was yesterday.  She has had 3-4 loose BM yesterday.  This AM she has had one and the consistency seems to be improving.  She tolerated dinner last evening w/o any nausea or vomiting.  She had no episodes of syncope overnight.    Consultants/Specialty:  General Surgery - [facility]  GI - [facility]  ID- Lynnette ID    Review of Systems:    Review of Systems   Constitutional: Negative for chills and fever.   Respiratory: Negative for cough and shortness of breath.    Cardiovascular: Negative for chest pain and palpitations.   Gastrointestinal: Positive for abdominal pain. Negative for nausea and vomiting.   Genitourinary: Negative for dysuria and urgency.   Musculoskeletal: Negative for myalgias.   Skin: Negative for rash.   Neurological: Positive for weakness. Negative for speech change.       Objective Data:   Physical Exam:   Vitals:   Temp:  [36.1 °C (97 °F)-37.2 °C (98.9 °F)] 37.2 °C (98.9 °F)  Pulse:  [64-88] 84  Resp:  [16-18] 18  BP: (115-171)/(78-98) 166/88  SpO2:  [91 %-96 %] 95 %    Physical Exam  Constitutional:       General: She is not in acute distress.     Appearance: She is not toxic-appearing.   HENT:      Head: Normocephalic and atraumatic.      Mouth/Throat:      Mouth: Mucous membranes are moist.      Pharynx: Oropharynx is clear.   Eyes:      General: Scleral icterus (mild, improving) present.      Extraocular Movements: Extraocular movements intact.   Cardiovascular:      Rate and Rhythm: Normal rate and regular rhythm.      Pulses: Normal pulses.      Heart sounds: Normal heart sounds. No murmur heard.   No friction rub. No  gallop.    Pulmonary:      Effort: Pulmonary effort is normal. No respiratory distress.      Breath sounds: Normal breath sounds. No wheezing, rhonchi or rales.   Abdominal:      General: Abdomen is flat. Bowel sounds are normal.      Palpations: Abdomen is soft.      Tenderness: There is abdominal tenderness (TTP RUQ, LUQ). There is no guarding or rebound.   Musculoskeletal:      Cervical back: Normal range of motion.      Right lower leg: Edema (2+ pitting edema up to thigh) present.      Left lower leg: Edema (3+ pitting edema up to thigh) present.      Comments: Edema improving.  UEs no longer weeping.   Skin:     General: Skin is warm.      Comments: Diffuse jaundice   Neurological:      General: No focal deficit present.      Mental Status: She is alert.   Psychiatric:         Mood and Affect: Mood normal.         Behavior: Behavior normal.           Labs:   HEMATOLOGY/ ONCOLOGY/ID:            Recent Labs     07/19/21  0525 07/19/21  0525 07/19/21  2226 07/20/21  0410 07/21/21  0340   WBC 9.6   < > 7.6 8.5 11.4*   RBC 2.85*   < > 2.42* 2.73* 3.13*   HEMOGLOBIN 8.6*   < > 7.5* 8.4* 9.7*   HEMATOCRIT 27.2*   < > 24.0* 26.8* 31.0*   MCV 95.4   < > 99.2* 98.2* 99.0*   MCH 30.2   < > 31.0 30.8 31.0   RDW 79.5*   < > 90.1* 89.2* 95.2*   PLATELETCT 73*   < > 57* 68* 93*   NEUTSPOLYS 62.60  --  73.30*  --  92.00*   LYMPHOCYTES 18.50*  --  13.00*  --  4.40*   MONOCYTES 14.00*  --  11.40  --  2.70   EOSINOPHILS 0.00  --  0.00  --  0.00   BASOPHILS 0.20  --  0.30  --  0.90   RBCMORPHOLO  --   --  Present  --  Present    < > = values in this interval not displayed.     Lab Results   Component Value Date    ARMIGVDN34 3701 (H) 07/09/2021    SRKDBSUA19 528 09/18/2018    FOLATE 8.0 07/16/2021    FERRITIN 383.0 (H) 07/13/2021    FERRITIN 136.0 07/09/2021    FERRITIN 94.1 06/30/2021    IRON 106 07/13/2021    IRON 113 07/09/2021    IRON 136 06/30/2021    [facility] 123 (L) 07/13/2021    [facility] 130 (L) 07/09/2021    [facility]  153 (L) 06/30/2021       RENAL:        Estimated GFR/CRCL = Estimated Creatinine Clearance: 189.8 mL/min (A) (by C-G formula based on SCr of 0.38 mg/dL (L)).  Recent Labs     07/19/21 0525 07/19/21 1250 07/19/21 2129 07/20/21 0410 07/21/21  0340   SODIUM 142   < > 139 143 142   POTASSIUM 2.2*   < > 3.2* 3.8 3.3*   CHLORIDE 93*   < > 90* 95* 97   CO2 40*   < > 38* 40* 37*   GLUCOSE 86   < > 140* 106* 90   BUN 10   < > 11 11 9   CREATININE 0.22*   < > 0.50 0.43* 0.38*   CALCIUM 7.8*   < > 7.4* 7.6* 8.3*   MAGNESIUM  --    < > 2.2 2.0 1.8   ALBUMIN 2.6*  --   --  2.5* 2.8*    < > = values in this interval not displayed.       GASTROINTESTINAL/ HEPATIC:          Recent Labs     07/19/21 0525 07/19/21 2226 07/20/21 0410 07/21/21  0340   ALTSGPT 100*  --  107* 115*   ASTSGOT 189*  --  178* 160*   ALKPHOSPHAT 419*  --  359* 351*   TBILIRUBIN 10.1*  --  8.1* 7.5*   ALBUMIN 2.6*  --  2.5* 2.8*   GLOBULIN 1.6*  --  1.7* 1.6*   MACROCYTOSIS  --  2+*  --  2+*     Lab Results   Component Value Date    AMMONIA 31 07/19/2021       ENDOCRINE:              Recent Labs     07/19/21 2129 07/19/21 2130 07/20/21 0410 07/21/21  0340   GLUCOSE 140*  --  106* 90   POCGLUCOSE  --  143*  --   --      Lab Results   Component Value Date    HBA1C 6.0 (H) 06/29/2021    HBA1C 6.0 (H) 04/27/2021    HBA1C 6.0 (H) 06/19/2020    FREET4 1.27 06/29/2021    FREET4 1.40 06/16/2021    FREET4 1.16 06/15/2021    FREET3 2.46 05/12/2021    FREET3 3.10 12/09/2019    FREET3 4.28 (H) 10/18/2019    CORTISOL >63.4 (H) 06/22/2021    CORTISOL 5.1 06/17/2021    CORTISOL 2.7 05/25/2021       Imaging:   No new imaging overnight.       Problem Representation:  Donna Isaac  is a 56 y.o. y/o woman w/ PMHx DVT on eliquis, viral's dz w/ recurrent syncope, diverticulosis, HTN, hypothyroid, MI who presented on 7/9/2021 w/ hematochezia, new thrombocytopenia with decreased hemoglobin and concerns for DIC of unknown etiology vs liver pathology leading to  thrombocytopenia.     * Thrombocytopenia (HCC)- (present on admission)  Assessment & Plan  Improving, platelets now steadily increasing. Etiology still unknown at this time.  Extensive lab work-up has been used to rule-out many common causes.  Current suspected etiology is drug induced liver injury leading to thrombocytopenia, as she is on multiple medications and frequently in the hospital requiring the use of abx, recently requiring vanc and meropenem on 7/11.  In some case reports, DOACs have also contributed to thrombocytopenia, as well as has lasix.  ITP is unlikely, as she has had a good response to platelets during this admission.  DIC unlikely as she has never had a markedly decrease in fibrinogen or schistocytes seen on smear.  MRCP on 7/16 shows no intra or extrahepatic dilatation that would be concerning for cholestasis or obstruction.  -Consider Liver Bx pending recovery of platelets, per GI don't need bx as long as LFTs continue to resolve  -Consider bone marrow bx  -AM CBC  -Rickettsia panel pending  -Transfuse plt if <20k      Elevated liver enzymes  Assessment & Plan  Initially there was a cholestatic liver pattern, w/ mild elevations of AST and ALT, which have now peaked and are trending down.  Alk phos has peaked as well and is trending down.  This elevation in liver enzymes is likely 2/2 drug induced liver injury.  Although she is s/p cholecystectomy, there could have been some biliary sludge contributing to a cholestatic pattern.  MRCP on 6/17 shows no concern for cholestasis at this time.  Hepatitis A, B, C have been non-reactive, HIV negative, AMA, KINZA negative, celiac screen negative, HSV IgG elevated, EBV IgG elevated, CMV IgG negative, alpha 1 antitrypsin negative.  -Per GI, continue to follow LFTs - if daily improvement is not observed, please call GI back and will pursue liver biopsy at that time      Anasarca  Assessment & Plan  Pt anasarcic with pitting edema throughout both LE and in  the UE as well.  This is likely multifactorial w/ components of heart failure leading to gut edema and acute exacerbation, adrenal insufficiency leading to electrolyte imbalances and liver disease all playing roles into it's development.  -Restart bumex 2 mg IV BID today  -40 mEq Potassium PO BID while diuresing  -replete K and Mg as necessary    Recurrent syncope- (present on admission)  Assessment & Plan  See adrenal insufficiency for A+P.    Adrenal insufficiency (Bartow's disease) ([facility])- (present on admission)  Assessment & Plan  Pt w/ hx of viral's disease leading to recurrent syncopal events that appear to be orthostatic.  She notes that she has been having episodes of syncope more frequently lately, and continues to have them while in bed.  We have continued her home meds here.  This will likely be an ongoing issue and will need further med titration outpatient to reduce the amount of syncopal events.  The continuation of these events while lying in bed is potentially due to receiving stress dose steroids earlier in the admission w/o enough of a taper for her chronic adrenal insufficiency.  Will continue to monitor for improvement.  Difficult balancing act between hypertension and maintaining high enough blood pressure to prevent orthostatic hypotension.  -Carvedilol decreased to 6.25 mg PO BID  -Fludrocortisone 0.1 PO BID  -Hydrocortisone 20 mg in AM, 10 mg in PM daily  -PT consult    Acute kidney injury (HCC)  Assessment & Plan  Improving today.  New MARY LOU on 7/19.  Cr on 7/17 was 0.17, elevated up to 0.5 on 7/19.  This meets KDIGO criteria for elevation of 0.3 w/in 48 hours or 1.5x elevation over 7 days.  This is likely a prerenal process in the setting of decreased intravascular volume during diuresis.  Pt also w/ metabolic alkalosis, which is likely due to hypokalemia.  -AM CMP, continue to monitor    Hypokalemia  Assessment & Plan  Pt w/ worsening hypokalemia during diuresis.  Attempted repletion w/ 80  mg IV today and 80 mg PO.  Hypokalemia is likely 2/2 to aggressive diuresis w/ bumex.  Hypokalemia will be rate limiting factor for diuresis.  Magnesium has also found to be low  -Repeat BMP at 1800  -Repeat BMP AM  -Repeat Mg AM  -Replete K and Mg as necessary    Lower GI bleed- (present on admission)  Assessment & Plan  3 episodes of hematochezia at rehab center, has resolved with no hematochezia or melena    History of pulmonary embolus (PE)- (present on admission)  Assessment & Plan  CTA of chest done on 4/27/2021 showed small left lower lobe subsegmental PE.  However repeat CTA on 5/10/2021 showed resolution.  Patient supposed to be on Eliquis for 3-month. Per vascular medicine she was not recommended to continue. Discontinued    Hypothyroidism- (present on admission)  Assessment & Plan  Hx of hypothyroidism.  Continue home Levothyroxine 75 mcg PO QD      Code Status: Full  DVT ppx: CI in the setting of low platelets  Diet: Cardiac diet  GI: Bowel regimen  T/L/D: CVC left IJV, reyna catheter      Minh Jones DO  PGY-1, [facility]    Assessment and plan discussed with senior resident and attending physician.
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
## 2021-07-21 NOTE — PROGRESS NOTES
4 Eyes Skin Assessment Completed by AILYN Crain and AILYN Hidalgo.    Head Jaundice  Ears Blanching  Nose WDL  Mouth healing sores  Neck Bruising and Jaundice  Breast/Chest Bruising, Jaundice and Edema  Shoulder Blades Blanching and bruising  Spine Bruising  (R) Arm/Elbow/Hand Bruising, Abrasion, Swelling, Weeping and Edema  (L) Arm/Elbow/Hand Bruising, Abrasion, Scab, Weeping and Edema  Abdomen Blanching, Scab and Bruising and edema  Groin Redness, Excoriation, Rash and Swelling  Scrotum/Coccyx/Buttocks Redness and Blanching  (R) Leg Bruising, Swelling, Weeping and Edema  (L) Leg Bruising, Swelling, Weeping and Edema  (R) Heel/Foot/Toe Redness, Blanching, Boggy, Swelling and Edema  (L) Heel/Foot/Toe Redness, Blanching, Boggy, Swelling, Scar and Edema          Devices In Places Tele Box, Perez and Central Line and SCDs      Interventions In Place Waffle Overlay, Pillows, Q2 Turns, Barrier Cream, Dri-Jack Pads, Heels Loaded W/Pillows and Pressure Redistribution Mattress    Possible Skin Injury No    Pictures Uploaded Into Epic N/A  Wound Consult Placed N/A  RN Wound Prevention Protocol Ordered Yes

## 2021-07-21 NOTE — DISCHARGE PLANNING
Anticipated Discharge Disposition: IRF    Action: pts platelets have been above 50 for three days. This RNCM Voalte messaged Librado Ott with [facility] to check if pt is appropriate for IRF. Updated pt/ot notes needed.    Barriers to Discharge: Updated therapy notes, IRF acceptance    Plan: f/u with medical team and pt to discuss dc needs and barriers

## 2021-07-21 NOTE — CARE PLAN
Problem: Pain - Standard  Goal: Alleviation of pain or a reduction in pain to the patient’s comfort goal  Outcome: Progressing     Problem: Knowledge Deficit - Standard  Goal: Patient and family/care givers will demonstrate understanding of plan of care, disease process/condition, diagnostic tests and medications  Outcome: Progressing     Problem: Fall Risk  Goal: Patient will remain free from falls  Outcome: Progressing     Problem: Skin Integrity  Goal: Skin integrity is maintained or improved  Outcome: Progressing     The patient is Watcher - Medium risk of patient condition declining or worsening    Shift Goals  Clinical Goals: pain control, monitor labs  Patient Goals: rest and pain control    Progress made toward(s) clinical / shift goals:  Patient resting comfortably, plan of care reviewed.

## 2021-07-21 NOTE — THERAPY
Physical Therapy   Re- evaluation    Patient Name: Donna Isaac  Age:  56 y.o., Sex:  female  Medical Record #: [id number]  Today's Date: 7/21/2021     Precautions: Fall Risk, Other (See Comments) (syncope; unclear origin of symptoms/see activity tolerance)    Assessment  Pt was seen for re-evaluation for dc planning. She was able to demonstrate bed mobility with mod I. However she was limited once EOB 2' to syncopal episode. Of note, pt BP immediately just prior to episode was 131/93 and given presentation, episode does not appear consistent with abnormal hemodynamics (no onset of symptoms, no dizziness/lightheadedness, no change in pallor, no diaphoresis; rapid onset of syncope and would note that pt does endorse hx of seizure activity as well and possible autonomic dysfunction issues). See below for details/recs. Will continue to visit to assist with dc planning and to prevent deconditioning and promote increased mobility/fnx strength though will be limited with PT progression until more medically stable with re: syncopal episodes.    Plan    Recommend Physical Therapy 2 times per week until therapy goals are met       DC Equipment Recommendations: Unable to determine at this time  Discharge Recommendations: Recommend post-acute placement for additional physical therapy services prior to discharge home (for compensatory strategies given current medical concerns and optimal functional progression)        07/21/21 1038   Prior Living Situation   Prior Services Intermittent Physical Support for ADL Per Family   Housing / Facility 1 Story House   Steps In Home 1   Rail Right Rail  (Steps in Home)   Elevator No   Bathroom Set up Walk In Shower;Grab Bars;Shower Chair   Equipment Owned Front-Wheel Walker;4-Wheel Walker;Wheelchair   Lives with - Patient's Self Care Capacity Spouse   Comments was admit from acute rehab; pt lives in 2 story home currently, though has room setup on ground floor with available BR;  noted plans to move into 1 story home soon as well   Prior Level of Functional Mobility   Bed Mobility Independent   Transfer Status Independent   Ambulation Independent   Distance Ambulation (Feet)   (limited community)   Assistive Devices Used Front-Wheel Walker  (intermittent use PTA)   Wheelchair Independent   Stairs Other (Comments)  (at [facility] pt was able to ambulate stairs, though had spv)   Comments prior to recent rehab stay/hospital; pt reports generally I with ADls, mobiltiy and majority of IADLs   History of Falls   History of Falls Yes   Cognition    Cognition / Consciousness WDL   Level of Consciousness Alert   Comments very pleasant and cooperative; non responsive with syncopal episode at EOB   Passive ROM Lower Body   Passive ROM Lower Body WDL   Active ROM Lower Body    Active ROM Lower Body  WDL   Strength Lower Body   Lower Body Strength  X   Gross Strength Generalized Weakness, Equal Bilaterally   Comments appears WFL for standing/ambulation   Sensation Lower Body   Lower Extremity Sensation   WDL   Balance Assessment   Sitting Balance (Static) Fair   Sitting Balance (Dynamic) Fair   Weight Shift Sitting Good   Comments sitting EOB with no richard LOB; unable to attempt standing as pt has syncope with recovery of symptoms in supine; see activity tolerance   Gait Analysis   Gait Level Of Assist Unable to Participate   Comments see balance/activity tolerance   Bed Mobility    Supine to Sit Modified Independent   Sit to Supine Total Assist  (2' to LOC)   Comments essentially min A/mod I prior to syncope   Functional Mobility   Sit to Stand Unable to Participate   Bed, Chair, Wheelchair Transfer Unable to Participate   Toilet Transfers Unable to Participate   Mobility bed mobility, EOB, return to supine 2' to syncope   How much difficulty does the patient currently have...   Turning over in bed (including adjusting bedclothes, sheets and blankets)? 2   Sitting down on and standing up from a chair  with arms (e.g., wheelchair, bedside commode, etc.) 2   Moving from lying on back to sitting on the side of the bed? 2   How much help from another person does the patient currently need...   Moving to and from a bed to a chair (including a wheelchair)? 3   Need to walk in a hospital room? 3   Climbing 3-5 steps with a railing? 2   6 clicks Mobility Score 14   Activity Tolerance   Sitting in Chair NT   Sitting Edge of Bed ~13 mins   Standing NT   Comments BP/symptoms as follows: asymptomatic in supine prior to mobility with BP at 135/85 ->sitting EOB asymptomatic with BP at 131/93 -> ~10 secs after BP ran pt has syncopal episode and returned to supine with resolving symptoms/alert in ~10<>30secs with BP at 154/90; noted that syncope appears more autonomic in nature than BP/hemodynamics (as BP taken just prior to pt having syncopal episode with no pre-syncopal symptoms); pt reports this is consistent with her syncopal hx (limited symptoms prior to episodes, unclear etiology)   Edema / Skin Assessment   Edema / Skin  Not Assessed   Short Term Goals    Short Term Goal # 1 Pt will be able to perform sit<>stand/transfers with FWW with Spv in 6tx to promote fnx progression towards I    Short Term Goal # 2 Pt will be able to ambulate 150ft with FWW with Spv in 6tx to promote fnx progression towards I    Education Group   Role of Physical Therapist Patient Response Patient;Acceptance;Explanation;Verbal Demonstration

## 2021-07-22 LAB
A2 MACROGLOB SERPL-MCNC: 214 MG/DL (ref 110–276)
ALBUMIN SERPL BCP-MCNC: 2.4 G/DL (ref 3.2–4.9)
ALBUMIN/GLOB SERPL: 1.4 G/DL
ALP SERPL-CCNC: 277 U/L (ref 30–99)
ALT SERPL W P-5'-P-CCNC: 68 IU/L (ref 0–40)
ALT SERPL-CCNC: 93 U/L (ref 2–50)
ANION GAP SERPL CALC-SCNC: 7 MMOL/L (ref 7–16)
APO A-I SERPL-MCNC: 22 MG/DL (ref 116–209)
AST SERPL W P-5'-P-CCNC: 218 IU/L (ref 0–40)
AST SERPL-CCNC: 99 U/L (ref 12–45)
BASOPHILS # BLD AUTO: 0.3 % (ref 0–1.8)
BASOPHILS # BLD: 0.03 K/UL (ref 0–0.12)
BILIRUB SERPL-MCNC: 23.4 MG/DL (ref 0–1.2)
BILIRUB SERPL-MCNC: 6.4 MG/DL (ref 0.1–1.5)
BUN SERPL-MCNC: 9 MG/DL (ref 8–22)
CALCIUM SERPL-MCNC: 7.6 MG/DL (ref 8.5–10.5)
CHLORIDE SERPL-SCNC: 94 MMOL/L (ref 96–112)
CHOLEST SERPL-MCNC: 166 MG/DL (ref 100–199)
CO2 SERPL-SCNC: 36 MMOL/L (ref 20–33)
CREAT SERPL-MCNC: 0.48 MG/DL (ref 0.5–1.4)
EOSINOPHIL # BLD AUTO: 0.01 K/UL (ref 0–0.51)
EOSINOPHIL NFR BLD: 0.1 % (ref 0–6.9)
ERYTHROCYTE [DISTWIDTH] IN BLOOD BY AUTOMATED COUNT: 94.7 FL (ref 35.9–50)
FIBROSIS SCORING 550107: ABNORMAL
FIBROSIS STAGE SERPL QL: ABNORMAL
GGT SERPL-CCNC: 466 IU/L (ref 0–60)
GLOBULIN SER CALC-MCNC: 1.7 G/DL (ref 1.9–3.5)
GLUCOSE SERPL-MCNC: 110 MG/DL (ref 65–99)
GLUCOSE SERPL-MCNC: 131 MG/DL (ref 65–99)
HAPTOGLOB SERPL-MCNC: <10 MG/DL (ref 33–346)
HCT VFR BLD AUTO: 25.7 % (ref 37–47)
HGB BLD-MCNC: 8 G/DL (ref 12–16)
IMM GRANULOCYTES # BLD AUTO: 0.07 K/UL (ref 0–0.11)
IMM GRANULOCYTES NFR BLD AUTO: 0.7 % (ref 0–0.9)
INTERPRETATIONS: Z4787: ABNORMAL
LABORATORY COMMENT REPORT: ABNORMAL
LIVER FIBR SCORE SERPL CALC.FIBROSURE: 1 (ref 0–0.21)
LYMPHOCYTES # BLD AUTO: 1.27 K/UL (ref 1–4.8)
LYMPHOCYTES NFR BLD: 12.5 % (ref 22–41)
MAGNESIUM SERPL-MCNC: 1.5 MG/DL (ref 1.5–2.5)
MCH RBC QN AUTO: 31 PG (ref 27–33)
MCHC RBC AUTO-ENTMCNC: 31.1 G/DL (ref 33.6–35)
MCV RBC AUTO: 99.6 FL (ref 81.4–97.8)
MONOCYTES # BLD AUTO: 0.87 K/UL (ref 0–0.85)
MONOCYTES NFR BLD AUTO: 8.6 % (ref 0–13.4)
NASH SCORING Z4789: ABNORMAL
NECROINFLAMMATORY ACT GRADE SERPL QL: ABNORMAL
NECROINFLAMMATORY ACT SCORE SERPL: 0.5
NEUTROPHILS # BLD AUTO: 7.88 K/UL (ref 2–7.15)
NEUTROPHILS NFR BLD: 77.8 % (ref 44–72)
NRBC # BLD AUTO: 0.04 K/UL
NRBC BLD-RTO: 0.4 /100 WBC
PHOSPHATE SERPL-MCNC: 3.7 MG/DL (ref 2.5–4.5)
PLATELET # BLD AUTO: 139 K/UL (ref 164–446)
PMV BLD AUTO: 12.5 FL (ref 9–12.9)
POTASSIUM SERPL-SCNC: 3.1 MMOL/L (ref 3.6–5.5)
PROT SERPL-MCNC: 4.1 G/DL (ref 6–8.2)
RBC # BLD AUTO: 2.58 M/UL (ref 4.2–5.4)
SERVICE CMNT-IMP: ABNORMAL
SODIUM SERPL-SCNC: 137 MMOL/L (ref 135–145)
STEATOSIS GRADE Z4778: ABNORMAL
STEATOSIS GRADING Z4788: ABNORMAL
STEATOSIS SCORE Z4777: 0.95 (ref 0–0.3)
TRIGL SERPL-MCNC: 115 MG/DL (ref 0–149)
WBC # BLD AUTO: 10.1 K/UL (ref 4.8–10.8)

## 2021-07-22 PROCEDURE — A9270 NON-COVERED ITEM OR SERVICE: HCPCS | Performed by: STUDENT IN AN ORGANIZED HEALTH CARE EDUCATION/TRAINING PROGRAM

## 2021-07-22 PROCEDURE — 700102 HCHG RX REV CODE 250 W/ 637 OVERRIDE(OP): Performed by: STUDENT IN AN ORGANIZED HEALTH CARE EDUCATION/TRAINING PROGRAM

## 2021-07-22 PROCEDURE — 700111 HCHG RX REV CODE 636 W/ 250 OVERRIDE (IP): Performed by: STUDENT IN AN ORGANIZED HEALTH CARE EDUCATION/TRAINING PROGRAM

## 2021-07-22 PROCEDURE — 700111 HCHG RX REV CODE 636 W/ 250 OVERRIDE (IP): Performed by: INTERNAL MEDICINE

## 2021-07-22 PROCEDURE — A9270 NON-COVERED ITEM OR SERVICE: HCPCS | Performed by: INTERNAL MEDICINE

## 2021-07-22 PROCEDURE — 99232 SBSQ HOSP IP/OBS MODERATE 35: CPT | Mod: GC | Performed by: HOSPITALIST

## 2021-07-22 PROCEDURE — 700111 HCHG RX REV CODE 636 W/ 250 OVERRIDE (IP)

## 2021-07-22 PROCEDURE — 770020 HCHG ROOM/CARE - TELE (206)

## 2021-07-22 PROCEDURE — 700102 HCHG RX REV CODE 250 W/ 637 OVERRIDE(OP): Performed by: INTERNAL MEDICINE

## 2021-07-22 PROCEDURE — 700102 HCHG RX REV CODE 250 W/ 637 OVERRIDE(OP): Performed by: FAMILY MEDICINE

## 2021-07-22 PROCEDURE — 94760 N-INVAS EAR/PLS OXIMETRY 1: CPT

## 2021-07-22 PROCEDURE — 85025 COMPLETE CBC W/AUTO DIFF WBC: CPT

## 2021-07-22 PROCEDURE — 80053 COMPREHEN METABOLIC PANEL: CPT

## 2021-07-22 PROCEDURE — A9270 NON-COVERED ITEM OR SERVICE: HCPCS | Performed by: FAMILY MEDICINE

## 2021-07-22 PROCEDURE — 84100 ASSAY OF PHOSPHORUS: CPT

## 2021-07-22 PROCEDURE — 83735 ASSAY OF MAGNESIUM: CPT

## 2021-07-22 RX ORDER — POTASSIUM CHLORIDE 20 MEQ/1
40 TABLET, EXTENDED RELEASE ORAL 3 TIMES DAILY
Status: DISCONTINUED | OUTPATIENT
Start: 2021-07-22 | End: 2021-07-22

## 2021-07-22 RX ORDER — MAGNESIUM SULFATE HEPTAHYDRATE 40 MG/ML
4 INJECTION, SOLUTION INTRAVENOUS ONCE
Status: COMPLETED | OUTPATIENT
Start: 2021-07-22 | End: 2021-07-22

## 2021-07-22 RX ORDER — POTASSIUM CHLORIDE 7.45 MG/ML
INJECTION INTRAVENOUS
Status: COMPLETED
Start: 2021-07-22 | End: 2021-07-22

## 2021-07-22 RX ORDER — BUMETANIDE 1 MG/1
2 TABLET ORAL
Status: DISCONTINUED | OUTPATIENT
Start: 2021-07-23 | End: 2021-07-26

## 2021-07-22 RX ORDER — POTASSIUM CHLORIDE 20 MEQ/1
40 TABLET, EXTENDED RELEASE ORAL 2 TIMES DAILY
Status: DISCONTINUED | OUTPATIENT
Start: 2021-07-22 | End: 2021-07-26

## 2021-07-22 RX ADMIN — Medication 2000 UNITS: at 05:03

## 2021-07-22 RX ADMIN — POTASSIUM CHLORIDE 40 MEQ: 1500 TABLET, EXTENDED RELEASE ORAL at 11:49

## 2021-07-22 RX ADMIN — FLUDROCORTISONE ACETATE 0.1 MG: 0.1 TABLET ORAL at 05:03

## 2021-07-22 RX ADMIN — POTASSIUM CHLORIDE 10 MEQ: 7.46 INJECTION, SOLUTION INTRAVENOUS at 06:21

## 2021-07-22 RX ADMIN — COLESEVELAM HYDROCHLORIDE 625 MG: 625 TABLET, COATED ORAL at 09:05

## 2021-07-22 RX ADMIN — CALCITONIN SALMON 200 UNITS: 200 SPRAY, METERED NASAL at 20:31

## 2021-07-22 RX ADMIN — POTASSIUM CHLORIDE 40 MEQ: 1500 TABLET, EXTENDED RELEASE ORAL at 05:03

## 2021-07-22 RX ADMIN — BUMETANIDE 2 MG: 1 TABLET ORAL at 05:04

## 2021-07-22 RX ADMIN — POTASSIUM CHLORIDE 10 MEQ: 7.46 INJECTION, SOLUTION INTRAVENOUS at 00:27

## 2021-07-22 RX ADMIN — OMEPRAZOLE 40 MG: 20 CAPSULE, DELAYED RELEASE ORAL at 17:59

## 2021-07-22 RX ADMIN — COLESEVELAM HYDROCHLORIDE 625 MG: 625 TABLET, COATED ORAL at 15:31

## 2021-07-22 RX ADMIN — POTASSIUM CHLORIDE 10 MEQ: 7.46 INJECTION, SOLUTION INTRAVENOUS at 00:28

## 2021-07-22 RX ADMIN — LEVOTHYROXINE SODIUM 75 MCG: 0.07 TABLET ORAL at 05:03

## 2021-07-22 RX ADMIN — LIOTHYRONINE SODIUM 25 MCG: 25 TABLET ORAL at 20:31

## 2021-07-22 RX ADMIN — FLUDROCORTISONE ACETATE 0.1 MG: 0.1 TABLET ORAL at 17:52

## 2021-07-22 RX ADMIN — CARVEDILOL 6.25 MG: 6.25 TABLET, FILM COATED ORAL at 17:52

## 2021-07-22 RX ADMIN — FENTANYL CITRATE 50 MCG: 50 INJECTION INTRAMUSCULAR; INTRAVENOUS at 17:59

## 2021-07-22 RX ADMIN — POTASSIUM CHLORIDE 10 MEQ: 7.46 INJECTION, SOLUTION INTRAVENOUS at 04:37

## 2021-07-22 RX ADMIN — OMEPRAZOLE 40 MG: 20 CAPSULE, DELAYED RELEASE ORAL at 05:03

## 2021-07-22 RX ADMIN — FENTANYL CITRATE 50 MCG: 50 INJECTION INTRAMUSCULAR; INTRAVENOUS at 21:00

## 2021-07-22 RX ADMIN — GABAPENTIN 400 MG: 400 CAPSULE ORAL at 05:03

## 2021-07-22 RX ADMIN — HYDROCORTISONE 10 MG: 20 TABLET ORAL at 17:52

## 2021-07-22 RX ADMIN — AMITRIPTYLINE HYDROCHLORIDE 10 MG: 10 TABLET, FILM COATED ORAL at 05:04

## 2021-07-22 RX ADMIN — POTASSIUM CHLORIDE 40 MEQ: 1500 TABLET, EXTENDED RELEASE ORAL at 17:52

## 2021-07-22 RX ADMIN — COLESEVELAM HYDROCHLORIDE 625 MG: 625 TABLET, COATED ORAL at 20:31

## 2021-07-22 RX ADMIN — HEPARIN SODIUM 5000 UNITS: 5000 INJECTION, SOLUTION INTRAVENOUS; SUBCUTANEOUS at 13:35

## 2021-07-22 RX ADMIN — HEPARIN SODIUM 5000 UNITS: 5000 INJECTION, SOLUTION INTRAVENOUS; SUBCUTANEOUS at 22:00

## 2021-07-22 RX ADMIN — GABAPENTIN 400 MG: 400 CAPSULE ORAL at 17:52

## 2021-07-22 RX ADMIN — CARVEDILOL 6.25 MG: 6.25 TABLET, FILM COATED ORAL at 07:23

## 2021-07-22 RX ADMIN — HYDROCORTISONE 20 MG: 20 TABLET ORAL at 05:03

## 2021-07-22 RX ADMIN — DULOXETINE HYDROCHLORIDE 60 MG: 60 CAPSULE, DELAYED RELEASE ORAL at 20:31

## 2021-07-22 RX ADMIN — MAGNESIUM SULFATE IN WATER 4 G: 40 INJECTION, SOLUTION INTRAVENOUS at 07:22

## 2021-07-22 RX ADMIN — FENTANYL CITRATE 50 MCG: 50 INJECTION INTRAMUSCULAR; INTRAVENOUS at 15:31

## 2021-07-22 RX ADMIN — POTASSIUM CHLORIDE 10 MEQ: 7.46 INJECTION, SOLUTION INTRAVENOUS at 03:18

## 2021-07-22 RX ADMIN — HEPARIN SODIUM 5000 UNITS: 5000 INJECTION, SOLUTION INTRAVENOUS; SUBCUTANEOUS at 05:02

## 2021-07-22 ASSESSMENT — ENCOUNTER SYMPTOMS
PALPITATIONS: 0
MYALGIAS: 0
CHILLS: 0
COUGH: 0
VOMITING: 0
FEVER: 0
SHORTNESS OF BREATH: 0
NAUSEA: 0
ABDOMINAL PAIN: 0

## 2021-07-22 ASSESSMENT — FIBROSIS 4 INDEX: FIB4 SCORE: 4.21

## 2021-07-22 ASSESSMENT — PAIN DESCRIPTION - PAIN TYPE: TYPE: ACUTE PAIN

## 2021-07-22 NOTE — CARE PLAN
Problem: Pain - Standard  Goal: Alleviation of pain or a reduction in pain to the patient’s comfort goal  7/22/2021 0847 by Edgar Serna R.N.  Outcome: Progressing  7/22/2021 0819 by Edgar Serna R.N.  Outcome: Progressing     Problem: Knowledge Deficit - Standard  Goal: Patient and family/care givers will demonstrate understanding of plan of care, disease process/condition, diagnostic tests and medications  7/22/2021 0847 by Edgar Serna R.N.  Outcome: Progressing  7/22/2021 0819 by Edgar Serna R.N.  Outcome: Progressing     Problem: Fall Risk  Goal: Patient will remain free from falls  7/22/2021 0847 by Edgar Serna R.N.  Outcome: Progressing  7/22/2021 0819 by Edgar Serna R.N.  Outcome: Progressing     Problem: Skin Integrity  Goal: Skin integrity is maintained or improved  7/22/2021 0847 by Edgar Serna R.N.  Outcome: Progressing  7/22/2021 0819 by Edgar Serna R.N.  Outcome: Progressing     The patient is Watcher - Medium risk of patient condition declining or worsening

## 2021-07-22 NOTE — PROGRESS NOTES
4 Eyes Skin Assessment Completed by Amber RN and Lucio RN.      Head Jaundice  Ears Blanching no longer requiring supplemental O2   Nose Blanching  Neck Blanching, Bruising and Jaundice  Breast/Chest Bruising, Jaundice and Edema  Shoulder Blades Blanching, bruising   Spine Blanching, bruising  (R) Arm/Elbow/Hand Blanching, Bruising, Abrasion, Swelling, Weeping and Edema  (L) Arm/Elbow/Hand Blanching, Bruising, Abrasion, Scab, Weeping and Edema  Abdomen Blanching, Scab and Bruising  Groin Redness, Blanching, Excoriation, Rash and Swelling  Scrotum/Coccyx/Buttocks Redness, slow to  Harley, reyna in place, barrier cream used. Excoriation and Moisture Fissure  (R) Leg Blanching, Bruising, Swelling, Weeping and Edema  (L) Leg Blanching, Bruising, Swelling, Weeping and Edema  (R) Heel/Foot/Toe Redness, Blanching, Boggy, Swelling and Edema  (L) Heel/Foot/Toe Redness, Blanching, Boggy, Bruising, Swelling, Scab and Edema              Devices In Places Tele Box, Central Line, indwelling catheter        Interventions In Place Heel Mepilex, Waffle Overlay, Pillows, Q2 Turns, frequent linen changes to ensure pt stays dry, Barrier Cream, Dri-Jack Pads, Heels Loaded W/Pillows and Pressure Redistribution Mattress

## 2021-07-22 NOTE — CARE PLAN
Problem: Pain - Standard  Goal: Alleviation of pain or a reduction in pain to the patient’s comfort goal  Outcome: Progressing     Problem: Knowledge Deficit - Standard  Goal: Patient and family/care givers will demonstrate understanding of plan of care, disease process/condition, diagnostic tests and medications  Outcome: Progressing     Problem: Fall Risk  Goal: Patient will remain free from falls  Outcome: Progressing     Problem: Skin Integrity  Goal: Skin integrity is maintained or improved  Outcome: Progressing     The patient is Watcher - Medium risk of patient condition declining or worsening    Shift Goals  Clinical Goals: pain control, monitor labs  Patient Goals: rest and pain control    Progress made toward(s) clinical / shift goals:  electrolytes replaced and monitored, plan of care reviewed with patient.

## 2021-07-22 NOTE — CARE PLAN
Problem: Fall Risk  Goal: Patient will remain free from falls  Outcome: Progressing  Patient has all fall precautions in place and uses call light appropriately

## 2021-07-22 NOTE — PROGRESS NOTES
MD notified of potassium at 2.5. Potassium IV ordered for replacement q hourly. Will continue to monitor.

## 2021-07-23 LAB
ALBUMIN SERPL BCP-MCNC: 2.7 G/DL (ref 3.2–4.9)
ALBUMIN/GLOB SERPL: 1.4 G/DL
ALP SERPL-CCNC: 301 U/L (ref 30–99)
ALT SERPL-CCNC: 96 U/L (ref 2–50)
ANION GAP SERPL CALC-SCNC: 10 MMOL/L (ref 7–16)
AST SERPL-CCNC: 97 U/L (ref 12–45)
BASOPHILS # BLD AUTO: 0.2 % (ref 0–1.8)
BASOPHILS # BLD: 0.02 K/UL (ref 0–0.12)
BILIRUB SERPL-MCNC: 6.2 MG/DL (ref 0.1–1.5)
BUN SERPL-MCNC: 11 MG/DL (ref 8–22)
CALCIUM SERPL-MCNC: 8.3 MG/DL (ref 8.5–10.5)
CHLORIDE SERPL-SCNC: 96 MMOL/L (ref 96–112)
CO2 SERPL-SCNC: 33 MMOL/L (ref 20–33)
CREAT SERPL-MCNC: 0.46 MG/DL (ref 0.5–1.4)
EOSINOPHIL # BLD AUTO: 0.02 K/UL (ref 0–0.51)
EOSINOPHIL NFR BLD: 0.2 % (ref 0–6.9)
ERYTHROCYTE [DISTWIDTH] IN BLOOD BY AUTOMATED COUNT: 93.6 FL (ref 35.9–50)
GLOBULIN SER CALC-MCNC: 2 G/DL (ref 1.9–3.5)
GLUCOSE SERPL-MCNC: 87 MG/DL (ref 65–99)
HCT VFR BLD AUTO: 28 % (ref 37–47)
HGB BLD-MCNC: 8.6 G/DL (ref 12–16)
IMM GRANULOCYTES # BLD AUTO: 0.12 K/UL (ref 0–0.11)
IMM GRANULOCYTES NFR BLD AUTO: 1 % (ref 0–0.9)
LYMPHOCYTES # BLD AUTO: 1.6 K/UL (ref 1–4.8)
LYMPHOCYTES NFR BLD: 13.6 % (ref 22–41)
MAGNESIUM SERPL-MCNC: 2 MG/DL (ref 1.5–2.5)
MCH RBC QN AUTO: 30.9 PG (ref 27–33)
MCHC RBC AUTO-ENTMCNC: 30.7 G/DL (ref 33.6–35)
MCV RBC AUTO: 100.7 FL (ref 81.4–97.8)
MONOCYTES # BLD AUTO: 1.17 K/UL (ref 0–0.85)
MONOCYTES NFR BLD AUTO: 9.9 % (ref 0–13.4)
NEUTROPHILS # BLD AUTO: 8.86 K/UL (ref 2–7.15)
NEUTROPHILS NFR BLD: 75.1 % (ref 44–72)
NRBC # BLD AUTO: 0.03 K/UL
NRBC BLD-RTO: 0.3 /100 WBC
PLATELET # BLD AUTO: 228 K/UL (ref 164–446)
PMV BLD AUTO: 12.6 FL (ref 9–12.9)
POTASSIUM SERPL-SCNC: 3.3 MMOL/L (ref 3.6–5.5)
PROT SERPL-MCNC: 4.7 G/DL (ref 6–8.2)
RBC # BLD AUTO: 2.78 M/UL (ref 4.2–5.4)
SODIUM SERPL-SCNC: 139 MMOL/L (ref 135–145)
WBC # BLD AUTO: 11.8 K/UL (ref 4.8–10.8)

## 2021-07-23 PROCEDURE — 97530 THERAPEUTIC ACTIVITIES: CPT

## 2021-07-23 PROCEDURE — 700102 HCHG RX REV CODE 250 W/ 637 OVERRIDE(OP): Performed by: FAMILY MEDICINE

## 2021-07-23 PROCEDURE — 700102 HCHG RX REV CODE 250 W/ 637 OVERRIDE(OP): Performed by: STUDENT IN AN ORGANIZED HEALTH CARE EDUCATION/TRAINING PROGRAM

## 2021-07-23 PROCEDURE — 85025 COMPLETE CBC W/AUTO DIFF WBC: CPT

## 2021-07-23 PROCEDURE — A9270 NON-COVERED ITEM OR SERVICE: HCPCS | Performed by: INTERNAL MEDICINE

## 2021-07-23 PROCEDURE — 700102 HCHG RX REV CODE 250 W/ 637 OVERRIDE(OP): Performed by: INTERNAL MEDICINE

## 2021-07-23 PROCEDURE — 770020 HCHG ROOM/CARE - TELE (206)

## 2021-07-23 PROCEDURE — A9270 NON-COVERED ITEM OR SERVICE: HCPCS | Performed by: STUDENT IN AN ORGANIZED HEALTH CARE EDUCATION/TRAINING PROGRAM

## 2021-07-23 PROCEDURE — 700111 HCHG RX REV CODE 636 W/ 250 OVERRIDE (IP): Performed by: INTERNAL MEDICINE

## 2021-07-23 PROCEDURE — 97535 SELF CARE MNGMENT TRAINING: CPT

## 2021-07-23 PROCEDURE — 80053 COMPREHEN METABOLIC PANEL: CPT

## 2021-07-23 PROCEDURE — 99232 SBSQ HOSP IP/OBS MODERATE 35: CPT | Mod: GC | Performed by: HOSPITALIST

## 2021-07-23 PROCEDURE — A9270 NON-COVERED ITEM OR SERVICE: HCPCS | Performed by: FAMILY MEDICINE

## 2021-07-23 PROCEDURE — 700111 HCHG RX REV CODE 636 W/ 250 OVERRIDE (IP): Performed by: STUDENT IN AN ORGANIZED HEALTH CARE EDUCATION/TRAINING PROGRAM

## 2021-07-23 PROCEDURE — 83735 ASSAY OF MAGNESIUM: CPT

## 2021-07-23 RX ORDER — FLUDROCORTISONE ACETATE 0.1 MG/1
0.2 TABLET ORAL 2 TIMES DAILY
Status: DISCONTINUED | OUTPATIENT
Start: 2021-07-23 | End: 2021-07-28

## 2021-07-23 RX ORDER — OMEPRAZOLE 20 MG/1
40 CAPSULE, DELAYED RELEASE ORAL DAILY
Status: DISCONTINUED | OUTPATIENT
Start: 2021-07-24 | End: 2021-07-30 | Stop reason: HOSPADM

## 2021-07-23 RX ORDER — HEPARIN SODIUM 5000 [USP'U]/ML
INJECTION, SOLUTION INTRAVENOUS; SUBCUTANEOUS
Status: ACTIVE
Start: 2021-07-23 | End: 2021-07-24

## 2021-07-23 RX ADMIN — DULOXETINE HYDROCHLORIDE 60 MG: 60 CAPSULE, DELAYED RELEASE ORAL at 21:02

## 2021-07-23 RX ADMIN — POTASSIUM CHLORIDE 40 MEQ: 1500 TABLET, EXTENDED RELEASE ORAL at 05:09

## 2021-07-23 RX ADMIN — FENTANYL CITRATE 50 MCG: 50 INJECTION INTRAMUSCULAR; INTRAVENOUS at 21:46

## 2021-07-23 RX ADMIN — COLESEVELAM HYDROCHLORIDE 625 MG: 625 TABLET, COATED ORAL at 21:02

## 2021-07-23 RX ADMIN — CALCITONIN SALMON 200 UNITS: 200 SPRAY, METERED NASAL at 21:02

## 2021-07-23 RX ADMIN — GABAPENTIN 400 MG: 400 CAPSULE ORAL at 05:09

## 2021-07-23 RX ADMIN — OMEPRAZOLE 40 MG: 20 CAPSULE, DELAYED RELEASE ORAL at 05:09

## 2021-07-23 RX ADMIN — LIOTHYRONINE SODIUM 25 MCG: 25 TABLET ORAL at 21:46

## 2021-07-23 RX ADMIN — POTASSIUM CHLORIDE 40 MEQ: 1500 TABLET, EXTENDED RELEASE ORAL at 17:18

## 2021-07-23 RX ADMIN — BUMETANIDE 2 MG: 1 TABLET ORAL at 05:09

## 2021-07-23 RX ADMIN — FENTANYL CITRATE 50 MCG: 50 INJECTION INTRAMUSCULAR; INTRAVENOUS at 09:47

## 2021-07-23 RX ADMIN — GABAPENTIN 400 MG: 400 CAPSULE ORAL at 17:18

## 2021-07-23 RX ADMIN — COLESEVELAM HYDROCHLORIDE 625 MG: 625 TABLET, COATED ORAL at 08:47

## 2021-07-23 RX ADMIN — HEPARIN SODIUM 5000 UNITS: 5000 INJECTION, SOLUTION INTRAVENOUS; SUBCUTANEOUS at 05:10

## 2021-07-23 RX ADMIN — AMITRIPTYLINE HYDROCHLORIDE 10 MG: 10 TABLET, FILM COATED ORAL at 05:09

## 2021-07-23 RX ADMIN — CARVEDILOL 6.25 MG: 6.25 TABLET, FILM COATED ORAL at 08:46

## 2021-07-23 RX ADMIN — FLUDROCORTISONE ACETATE 0.2 MG: 0.1 TABLET ORAL at 17:18

## 2021-07-23 RX ADMIN — CARVEDILOL 6.25 MG: 6.25 TABLET, FILM COATED ORAL at 17:18

## 2021-07-23 RX ADMIN — FENTANYL CITRATE 50 MCG: 50 INJECTION INTRAMUSCULAR; INTRAVENOUS at 14:37

## 2021-07-23 RX ADMIN — Medication 2000 UNITS: at 05:09

## 2021-07-23 RX ADMIN — HYDROCORTISONE 20 MG: 20 TABLET ORAL at 05:09

## 2021-07-23 RX ADMIN — LEVOTHYROXINE SODIUM 75 MCG: 0.07 TABLET ORAL at 05:09

## 2021-07-23 RX ADMIN — HYDROCORTISONE 10 MG: 20 TABLET ORAL at 17:18

## 2021-07-23 RX ADMIN — FLUDROCORTISONE ACETATE 0.1 MG: 0.1 TABLET ORAL at 05:09

## 2021-07-23 RX ADMIN — HEPARIN SODIUM 5000 UNITS: 5000 INJECTION, SOLUTION INTRAVENOUS; SUBCUTANEOUS at 14:37

## 2021-07-23 RX ADMIN — COLESEVELAM HYDROCHLORIDE 625 MG: 625 TABLET, COATED ORAL at 14:37

## 2021-07-23 ASSESSMENT — ENCOUNTER SYMPTOMS
PALPITATIONS: 0
DIARRHEA: 0
CHILLS: 0
FEVER: 0
MYALGIAS: 0
SHORTNESS OF BREATH: 0
NAUSEA: 0
VOMITING: 0
ABDOMINAL PAIN: 1

## 2021-07-23 ASSESSMENT — COGNITIVE AND FUNCTIONAL STATUS - GENERAL
EATING MEALS: A LITTLE
TURNING FROM BACK TO SIDE WHILE IN FLAT BAD: A LOT
DAILY ACTIVITIY SCORE: 15
SUGGESTED CMS G CODE MODIFIER DAILY ACTIVITY: CK
TOILETING: A LOT
PERSONAL GROOMING: A LITTLE
SUGGESTED CMS G CODE MODIFIER MOBILITY: CM
WALKING IN HOSPITAL ROOM: TOTAL
MOBILITY SCORE: 8
STANDING UP FROM CHAIR USING ARMS: TOTAL
CLIMB 3 TO 5 STEPS WITH RAILING: TOTAL
DRESSING REGULAR LOWER BODY CLOTHING: A LOT
HELP NEEDED FOR BATHING: A LOT
DRESSING REGULAR UPPER BODY CLOTHING: A LITTLE
MOVING FROM LYING ON BACK TO SITTING ON SIDE OF FLAT BED: UNABLE
MOVING TO AND FROM BED TO CHAIR: A LOT

## 2021-07-23 ASSESSMENT — GAIT ASSESSMENTS: GAIT LEVEL OF ASSIST: UNABLE TO PARTICIPATE

## 2021-07-23 ASSESSMENT — PAIN DESCRIPTION - PAIN TYPE: TYPE: ACUTE PAIN

## 2021-07-23 NOTE — PROGRESS NOTES
12 hour chart check    Monitor Summary  First degree AVB 74-88   0.22/0.06/0.44  AVB is new, physician notified

## 2021-07-23 NOTE — CARE PLAN
Problem: Pain - Standard  Goal: Alleviation of pain or a reduction in pain to the patient’s comfort goal  Outcome: Progressing     Problem: Knowledge Deficit - Standard  Goal: Patient and family/care givers will demonstrate understanding of plan of care, disease process/condition, diagnostic tests and medications  Outcome: Progressing     Problem: Fall Risk  Goal: Patient will remain free from falls  Outcome: Progressing     Problem: Skin Integrity  Goal: Skin integrity is maintained or improved  Outcome: Progressing     The patient is Watcher - Medium risk of patient condition declining or worsening    Shift Goals  Clinical Goals: pain control, monitor labs  Patient Goals: rest and pain control    Progress made toward(s) clinical / shift goals:  patient tolerating therapy, able to ambulate with PT.

## 2021-07-23 NOTE — CARE PLAN
Problem: Pain - Standard  Goal: Alleviation of pain or a reduction in pain to the patient’s comfort goal  Outcome: Progressing    Patients pain has been well controlled and is medicated as needed.

## 2021-07-23 NOTE — THERAPY
Physical Therapy   Daily Treatment     Patient Name: Donna Isaac  Age:  56 y.o., Sex:  female  Medical Record #: [id number]  Today's Date: 7/23/2021     Precautions: Fall Risk    Assessment    Pt asked PT to be seen today to attempt to get EOB. Pt was brought EOB with min A, BP was taken and found to be 96/75. Pt denied symptoms of syncope. PT then stood pt with HHA and knees blocked for approx 20 sec, pt then sat down and again had no syncopal symptoms. Pt was stood two more times all without symptoms. Note that when PT attempted steps pt buckled, therefore PT did not attempt to transfer into a chair. PT has added 2 additional weekly visits to pt's schedule for a total of 4.   Plan    Treatment plan modified to 4 times per week until therapy goals are met for the following treatments:  Bed Mobility, Gait Training, Therapeutic Activities and Therapeutic Exercises.    DC Equipment Recommendations: Unable to determine at this time  Discharge Recommendations: Recommend post-acute placement for additional physical therapy services prior to discharge home (for compensatory strategies given current medical concerns)       Objective       07/23/21 1140   Pain 0 - 10 Group   Therapist Pain Assessment Post Activity Pain Same as Prior to Activity;Nurse Notified;0   Cognition    Cognition / Consciousness WDL   Level of Consciousness Alert   Balance   Sitting Balance (Static) Fair   Sitting Balance (Dynamic) Fair -   Standing Balance (Static) Trace +   Standing Balance (Dynamic) Dependent   Weight Shift Sitting Fair   Weight Shift Standing Absent   Skilled Intervention Verbal Cuing;Tactile Cuing;Facilitation;Postural Facilitation   Comments HHA, knees blocked by therapy   Gait Analysis   Gait Level Of Assist Unable to Participate   Bed Mobility    Supine to Sit Minimal Assist   Sit to Supine Minimal Assist   Scooting Supervised   Rolling Minimal Assist to Rt.   Functional Mobility   Sit to Stand Maximal Assist   Bed,  Chair, Wheelchair Transfer Unable to Participate   Skilled Intervention Verbal Cuing;Tactile Cuing;Facilitation;Postural Facilitation   Activity Tolerance   Sitting Edge of Bed 20   Standing 5   Comments no syncopal episodes. BP= 96/75 in sitting    Short Term Goals    Short Term Goal # 1 Pt will be able to perform sit<>stand/transfers with FWW with Spv in 6tx to promote fnx progression towards I    Goal Outcome # 1 goal not met   Short Term Goal # 2 Pt will be able to ambulate 150ft with FWW with Spv in 6tx to promote fnx progression towards I    Goal Outcome # 2 Goal not met

## 2021-07-23 NOTE — PROGRESS NOTES
Daily Progress Note:     Date of Service: 7/22/2021  Primary Team: [facility] Blue Team   Attending: Edson Santizo M.D.   Senior Resident: Dr. Villegas  Intern: Dr. Jones  Contact:  [phone]    Chief Complaint:   Abdominal pain    Subjective:  No acute events overnight.  This AM her abdominal pain has pretty much resolved.  She has had a bowel movement which was solid and soft, which is an improved consistency.  She worked w/ PT/OT yesterday and had an episode of syncope while working with them.  Orthostatic blood pressures were taken at that time and they were not orthostatic while moving from laying to sitting.  She had a net negative output of 5820.    Consultants/Specialty:  General Surgery - [facility]  GI - [facility]  ID- [facility]    Review of Systems:    Review of Systems   Constitutional: Negative for chills and fever.   Respiratory: Negative for cough and shortness of breath.    Cardiovascular: Negative for chest pain and palpitations.   Gastrointestinal: Negative for abdominal pain, nausea and vomiting.   Genitourinary: Negative for dysuria and urgency.   Musculoskeletal: Negative for myalgias.   Skin: Negative for rash.       Objective Data:   Physical Exam:   Vitals:   Temp:  [35.9 °C (96.6 °F)-37.3 °C (99.2 °F)] 36.5 °C (97.7 °F)  Pulse:  [66-92] 81  Resp:  [16-19] 16  BP: ()/() 147/100  SpO2:  [90 %-96 %] 93 %    Physical Exam  Constitutional:       General: She is not in acute distress.     Appearance: She is not toxic-appearing.   HENT:      Head: Normocephalic and atraumatic.      Mouth/Throat:      Mouth: Mucous membranes are moist.      Pharynx: Oropharynx is clear.   Eyes:      General: Scleral icterus present.      Extraocular Movements: Extraocular movements intact.   Cardiovascular:      Rate and Rhythm: Normal rate and regular rhythm.      Pulses: Normal pulses.      Heart sounds: Normal heart sounds. No murmur heard.   No friction rub. No gallop.    Pulmonary:      Effort: Pulmonary  effort is normal. No respiratory distress.      Breath sounds: Normal breath sounds. No wheezing, rhonchi or rales.   Abdominal:      General: Abdomen is flat. Bowel sounds are normal. There is no distension.      Palpations: Abdomen is soft.      Tenderness: There is abdominal tenderness (LUQ). There is no guarding or rebound.   Musculoskeletal:         General: Swelling present.      Right lower leg: Edema present.      Left lower leg: Edema present.      Comments: 2+ pitting edema in B/l LE, improving today.   Skin:     General: Skin is warm and dry.      Coloration: Skin is not jaundiced.      Comments: Hematoma on left lateral lower leg.   Neurological:      General: No focal deficit present.      Mental Status: She is alert.   Psychiatric:         Mood and Affect: Mood normal.         Behavior: Behavior normal.           Labs:   HEMATOLOGY/ ONCOLOGY/ID:            Recent Labs     07/19/21  2226 07/19/21  2226 07/20/21  0410 07/21/21  0340 07/22/21  0325   WBC 7.6   < > 8.5 11.4* 10.1   RBC 2.42*   < > 2.73* 3.13* 2.58*   HEMOGLOBIN 7.5*   < > 8.4* 9.7* 8.0*   HEMATOCRIT 24.0*   < > 26.8* 31.0* 25.7*   MCV 99.2*   < > 98.2* 99.0* 99.6*   MCH 31.0   < > 30.8 31.0 31.0   RDW 90.1*   < > 89.2* 95.2* 94.7*   PLATELETCT 57*   < > 68* 93* 139*   MPV  --   --   --   --  12.5   NEUTSPOLYS 73.30*  --   --  92.00* 77.80*   LYMPHOCYTES 13.00*  --   --  4.40* 12.50*   MONOCYTES 11.40  --   --  2.70 8.60   EOSINOPHILS 0.00  --   --  0.00 0.10   BASOPHILS 0.30  --   --  0.90 0.30   RBCMORPHOLO Present  --   --  Present  --     < > = values in this interval not displayed.     Lab Results   Component Value Date    WHZGBIUA60 3701 (H) 07/09/2021    GKOAFXRT81 528 09/18/2018    FOLATE 8.0 07/16/2021    FERRITIN 383.0 (H) 07/13/2021    FERRITIN 136.0 07/09/2021    FERRITIN 94.1 06/30/2021    IRON 106 07/13/2021    IRON 113 07/09/2021    IRON 136 06/30/2021    TOTIRONBC 123 (L) 07/13/2021    TOTIRONBC 130 (L) 07/09/2021     [facility] 153 (L) 06/30/2021       RENAL:        Estimated GFR/CRCL = Estimated Creatinine Clearance: 150.2 mL/min (A) (by C-G formula based on SCr of 0.48 mg/dL (L)).  Recent Labs     07/20/21 0410 07/20/21 0410 07/21/21 0340 07/21/21 1822 07/22/21  0325   SODIUM 143   < > 142 138 137   POTASSIUM 3.8   < > 3.3* 2.5* 3.1*   CHLORIDE 95*   < > 97 92* 94*   CO2 40*   < > 37* 35* 36*   GLUCOSE 106*   < > 90 97 110*   BUN 11   < > 9 8 9   CREATININE 0.43*   < > 0.38* 0.50 0.48*   CALCIUM 7.6*   < > 8.3* 8.3* 7.6*   MAGNESIUM 2.0  --  1.8  --  1.5   PHOSPHORUS  --   --   --   --  3.7   ALBUMIN 2.5*  --  2.8*  --  2.4*    < > = values in this interval not displayed.       GASTROINTESTINAL/ HEPATIC:          Recent Labs     07/19/21 2226 07/20/21 0410 07/21/21 0340 07/22/21  0325   ALTSGPT  --  107* 115* 93*   ASTSGOT  --  178* 160* 99*   ALKPHOSPHAT  --  359* 351* 277*   TBILIRUBIN  --  8.1* 7.5* 6.4*   ALBUMIN  --  2.5* 2.8* 2.4*   GLOBULIN  --  1.7* 1.6* 1.7*   MACROCYTOSIS 2+*  --  2+*  --      Lab Results   Component Value Date    AMMONIA 31 07/19/2021       ENDOCRINE:              Recent Labs     07/19/21 2130 07/20/21 0410 07/21/21 0340 07/21/21 1822 07/22/21  0325   GLUCOSE  --    < > 90 97 110*   POCGLUCOSE 143*  --   --   --   --     < > = values in this interval not displayed.     Lab Results   Component Value Date    HBA1C 6.0 (H) 06/29/2021    HBA1C 6.0 (H) 04/27/2021    HBA1C 6.0 (H) 06/19/2020    FREET4 1.27 06/29/2021    FREET4 1.40 06/16/2021    FREET4 1.16 06/15/2021    FREET3 2.46 05/12/2021    FREET3 3.10 12/09/2019    FREET3 4.28 (H) 10/18/2019    CORTISOL >63.4 (H) 06/22/2021    CORTISOL 5.1 06/17/2021    CORTISOL 2.7 05/25/2021       Imaging:   No new imaging overnight    Problem Representation:  Donna Isaac  is a 56 y.o. y/o woman w/ PMHx DVT on eliquis, viral's dz w/ recurrent syncope, diverticulosis, HTN, hypothyroid, MI who presented on 7/9/2021 w/ hematochezia, new  thrombocytopenia with decreased hemoglobin and concerns for DIC of unknown etiology vs liver pathology leading to thrombocytopenia.     * Thrombocytopenia (HCC)- (present on admission)  Assessment & Plan  Improving, platelets now steadily increasing. Etiology still unknown at this time, likely DILI.  Extensive lab work-up has been used to rule-out many common causes.  Current suspected etiology is drug induced liver injury leading to thrombocytopenia, as she is on multiple medications and frequently in the hospital requiring the use of abx, recently requiring vanc and meropenem on 7/11.  In some case reports, DOACs have also contributed to thrombocytopenia, as well as has lasix.  ITP is unlikely, as she has had a good response to platelets during this admission.  DIC unlikely as she has never had a markedly decrease in fibrinogen or schistocytes seen on smear.  MRCP on 7/16 shows no intra or extrahepatic dilatation that would be concerning for cholestasis or obstruction.  -Consider Liver Bx pending recovery of platelets, per GI don't need bx as long as LFTs continue to resolve  -Consider bone marrow bx  -AM CBC  -Rickettsia panel pending  -Transfuse plt if <20k      Elevated liver enzymes  Assessment & Plan  Continues to improve.  Initially there was a cholestatic liver pattern, w/ mild elevations of AST and ALT, which have now peaked and are trending down.  Alk phos has peaked as well and is trending down.  This elevation in liver enzymes is likely 2/2 drug induced liver injury.  Although she is s/p cholecystectomy, there could have been some biliary sludge contributing to a cholestatic pattern.  MRCP on 6/17 shows no concern for cholestasis at this time.  Hepatitis A, B, C have been non-reactive, HIV negative, AMA, KINZA negative, celiac screen negative, HSV IgG elevated, EBV IgG elevated, CMV IgG negative, alpha 1 antitrypsin negative.  -Per GI, continue to follow LFTs - if daily improvement is not observed, please  call GI back and will pursue liver biopsy at that time      Anasarca  Assessment & Plan  Pt anasarcic with pitting edema throughout both LE and in the UE as well.  This is likely multifactorial w/ components of heart failure leading to gut edema and acute exacerbation, adrenal insufficiency leading to electrolyte imbalances and liver disease all playing roles into it's development.  -Restart bumex 2 mg QD  -40 mEq Potassium PO BID while diuresing  -replete K and Mg as necessary    Recurrent syncope- (present on admission)  Assessment & Plan  See adrenal insufficiency for A+P.    Adrenal insufficiency ([place]'s disease) (HCC)- (present on admission)  Assessment & Plan  Pt w/ hx of viral's disease leading to recurrent syncopal events that appear to be orthostatic.  She notes that she has been having episodes of syncope more frequently lately, and continues to have them while in bed.  We have continued her home meds here.  This will likely be an ongoing issue and will need further med titration outpatient to reduce the amount of syncopal events.  The continuation of these events while lying in bed is potentially due to receiving stress dose steroids earlier in the admission w/o enough of a taper for her chronic adrenal insufficiency.  Will continue to monitor for improvement.  Difficult balancing act between hypertension and maintaining high enough blood pressure to prevent orthostatic hypotension.  There is some concern that these episodes of passing out could also be seizures, as pt has a hx of seizure.  She hasn't taken any antiepileptics since 2009 and hasn't noticed any issues with seizures, she hasn't been followed by neurology since then.  She sometimes wakes up from passing out and is confused, which is concerning for post-ictal state.  While working w/ PT on 7/21, she had an episode while trying to stand, orthostatic blood pressures at that time were normal for laying and sitting, but w/ an episode of  syncope this could be considered positive orthostatic hypotension.    -Carvedilol decreased to 6.25 mg PO BID  -Fludrocortisone 0.1 PO BID  -Hydrocortisone 20 mg in AM, 10 mg in PM daily  -PT consult    Acute kidney injury (HCC)  Assessment & Plan  Improving today.  New MARY LOU on 7/19.  Cr on 7/17 was 0.17, elevated up to 0.5 on 7/19.  This meets KDIGO criteria for elevation of 0.3 w/in 48 hours or 1.5x elevation over 7 days.  This is likely a prerenal process in the setting of decreased intravascular volume during diuresis.  Pt also w/ metabolic alkalosis, which is likely due to hypokalemia.  -AM CMP, continue to monitor    Hypokalemia  Assessment & Plan  Pt w/ worsening hypokalemia during diuresis.  Attempted repletion w/ 80 mg IV today and 80 mg PO.  Hypokalemia is likely 2/2 to aggressive diuresis w/ bumex.  Hypokalemia will be rate limiting factor for diuresis.  Magnesium has also found to be low  -Repeat BMP at 1800  -Repeat BMP AM  -Repeat Mg AM  -Replete K and Mg as necessary    Lower GI bleed- (present on admission)  Assessment & Plan  3 episodes of hematochezia at rehab center, has resolved with no hematochezia or melena    History of pulmonary embolus (PE)- (present on admission)  Assessment & Plan  CTA of chest done on 4/27/2021 showed small left lower lobe subsegmental PE.  However repeat CTA on 5/10/2021 showed resolution.  Patient supposed to be on Eliquis for 3-month. Per vascular medicine she was not recommended to continue. Discontinued    Hypothyroidism- (present on admission)  Assessment & Plan  Hx of hypothyroidism.  Continue home Levothyroxine 75 mcg PO QD      Code Status: Full  DVT ppx: CI in the setting of low platelets  Diet: Cardiac diet  GI: Bowel regimen  T/L/D: CVC left IJV, reyna catheter      Minh Jones DO  PGY-1, [facility]    Assessment and plan discussed with senior resident and attending physician.

## 2021-07-23 NOTE — PROGRESS NOTES
4 Eyes Skin Assessment Completed by AILYN Clark and AILYN Leo.        Head Jaundice  Ears Blanching no longer requiring supplemental O2   Nose Blanching  Neck Blanching, Bruising and Jaundice  Breast/Chest Bruising, Jaundice and Edema  Shoulder Blades Blanching, bruising   Spine Blanching, bruising  (R) Arm/Elbow/Hand Blanching, Bruising, Abrasion, Swelling, Weeping and Edema  (L) Arm/Elbow/Hand Blanching, Bruising, Abrasion, Scab, Weeping and Edema  Abdomen Blanching, Scab and Bruising  Groin Redness, Blanching, Excoriation, Rash and Swelling  Scrotum/Coccyx/Buttocks Redness, slow to  Harley, reyna in place, barrier cream used. Excoriation and Moisture Fissure  (R) Leg Blanching, Bruising, Swelling, Weeping and Edema  (L) Leg Blanching, Bruising, Swelling, Weeping and Edema  (R) Heel/Foot/Toe Redness, Blanching, Boggy, Swelling and Edema  (L) Heel/Foot/Toe Redness, Blanching, Boggy, Bruising, Swelling, Scab and Edema              Devices In Places Tele Box, Central Line, indwelling catheter        Interventions In Place Heel Mepilex, Waffle Overlay, Pillows, Q2 Turns, frequent linen changes to ensure pt stays dry, Barrier Cream, Dri-Jack Pads, Heels Loaded W/Pillows and Pressure Redistribution Mattress

## 2021-07-23 NOTE — THERAPY
"Occupational Therapy  Daily Treatment     Patient Name: Donna Isaac  Age:  56 y.o., Sex:  female  Medical Record #: [id number]  Today's Date: 7/23/2021     Precautions  Precautions: (P) Fall Risk  Comments: (P) syncope    Assessment    Pt seen for follow up OT tx session, pt highly motivated to return to PLOF, no syncopal episodes throughout session despite BP being low but sustained throughout movement (80s/40s). Pt able to progress OOB mobility and participation in ADLs, still requiring maxA for mobility but encouraged by progress, will continue to see patient for skilled therapy while admitted and recommend post-acute placement.    Plan    Continue current treatment plan.    DC Equipment Recommendations: (P) Unable to determine at this time  Discharge Recommendations: (P) Recommend post-acute placement for additional occupational therapy services prior to discharge home    Subjective    \"I really want to try today\"     Objective       07/23/21 1150   Precautions   Precautions Fall Risk   Comments syncope   Pain 0 - 10 Group   Therapist Pain Assessment Post Activity Pain Same as Prior to Activity;Nurse Notified   Non Verbal Descriptors   Non Verbal Scale  Calm   Cognition    Cognition / Consciousness WDL   Level of Consciousness Alert   Comments Very pleasant, cooperative, no syncopal episode   Active ROM Upper Body   Active ROM Upper Body  WDL   Dominant Hand Right   Strength Upper Body   Upper Body Strength  X   Gross Strength Generalized Weakness, Equal Bilaterally.    Sensation Upper Body   Upper Extremity Sensation  WDL   Other Treatments   Other Treatments Provided No syncopal episodes throughout session, blood pressure low but stable throughout session   Balance   Sitting Balance (Static) Fair   Sitting Balance (Dynamic) Fair   Standing Balance (Static) Fair   Standing Balance (Dynamic) Fair   Weight Shift Sitting Fair   Weight Shift Standing Poor   Skilled Intervention Verbal Cuing   Comments w/ " HHA, knees blocked to prevent buckling   Bed Mobility    Supine to Sit Minimal Assist   Sit to Supine Minimal Assist   Scooting Supervised   Rolling Minimal Assist to Rt.   Activities of Daily Living   Grooming Supervision   Upper Body Dressing Supervision   Lower Body Dressing Maximal Assist   Toileting   (NT-refused need, cath in place)   Skilled Intervention Verbal Cuing   How much help from another person does the patient currently need...   Putting on and taking off regular lower body clothing? 2   Bathing (including washing, rinsing, and drying)? 2   Toileting, which includes using a toilet, bedpan, or urinal? 2   Putting on and taking off regular upper body clothing? 3   Taking care of personal grooming such as brushing teeth? 3   Eating meals? 3   6 Clicks Daily Activity Score 15   Functional Mobility   Sit to Stand Maximal Assist   Bed, Chair, Wheelchair Transfer Unable to Participate   Toilet Transfers Unable to Participate   Mobility bed mobility, EOB, multiple STS, attempted side steps, BTB   Skilled Intervention Verbal Cuing   Comments w/ FWW   Activity Tolerance   Sitting Edge of Bed 20   Standing 5   Patient / Family Goals   Patient / Family Goal #1 To go home   Goal #1 Outcome Progressing as expected   Short Term Goals   Short Term Goal # 1 Pt will complete ADL transfers with supervision   Goal Outcome # 1 Progressing as expected   Short Term Goal # 2 Pt will complete LB dressing with supervision AE PRN   Goal Outcome # 2 Progressing slower than expected   Short Term Goal # 3 Pt will complete toileting with supervision   Goal Outcome # 3 Progressing slower than expected   Education Group   Education Provided Role of Occupational Therapist   Role of Occupational Therapist Patient Response Patient;Acceptance;Explanation   Interdisciplinary Plan of Care Collaboration   IDT Collaboration with  Nursing   Patient Position at End of Therapy In Bed;Bed Alarm On;Call Light within Reach;Tray Table within  Reach;Phone within Reach   Collaboration Comments RN updated

## 2021-07-24 LAB
ALBUMIN SERPL BCP-MCNC: 2.6 G/DL (ref 3.2–4.9)
ALBUMIN/GLOB SERPL: 1.4 G/DL
ALP SERPL-CCNC: 257 U/L (ref 30–99)
ALT SERPL-CCNC: 90 U/L (ref 2–50)
ANION GAP SERPL CALC-SCNC: 11 MMOL/L (ref 7–16)
ANION GAP SERPL CALC-SCNC: 9 MMOL/L (ref 7–16)
AST SERPL-CCNC: 86 U/L (ref 12–45)
BASOPHILS # BLD AUTO: 0.3 % (ref 0–1.8)
BASOPHILS # BLD: 0.03 K/UL (ref 0–0.12)
BILIRUB SERPL-MCNC: 5.2 MG/DL (ref 0.1–1.5)
BUN SERPL-MCNC: 11 MG/DL (ref 8–22)
BUN SERPL-MCNC: 12 MG/DL (ref 8–22)
CALCIUM SERPL-MCNC: 8.1 MG/DL (ref 8.5–10.5)
CALCIUM SERPL-MCNC: 8.2 MG/DL (ref 8.5–10.5)
CHLORIDE SERPL-SCNC: 100 MMOL/L (ref 96–112)
CHLORIDE SERPL-SCNC: 101 MMOL/L (ref 96–112)
CO2 SERPL-SCNC: 27 MMOL/L (ref 20–33)
CO2 SERPL-SCNC: 32 MMOL/L (ref 20–33)
CREAT SERPL-MCNC: 0.65 MG/DL (ref 0.5–1.4)
CREAT SERPL-MCNC: 0.83 MG/DL (ref 0.5–1.4)
EOSINOPHIL # BLD AUTO: 0.02 K/UL (ref 0–0.51)
EOSINOPHIL NFR BLD: 0.2 % (ref 0–6.9)
ERYTHROCYTE [DISTWIDTH] IN BLOOD BY AUTOMATED COUNT: 93.3 FL (ref 35.9–50)
GLOBULIN SER CALC-MCNC: 1.9 G/DL (ref 1.9–3.5)
GLUCOSE SERPL-MCNC: 113 MG/DL (ref 65–99)
GLUCOSE SERPL-MCNC: 90 MG/DL (ref 65–99)
HAPTOGLOB SERPL-MCNC: 28 MG/DL (ref 30–200)
HCT VFR BLD AUTO: 27.3 % (ref 37–47)
HGB BLD-MCNC: 8.3 G/DL (ref 12–16)
IMM GRANULOCYTES # BLD AUTO: 0.16 K/UL (ref 0–0.11)
IMM GRANULOCYTES NFR BLD AUTO: 1.4 % (ref 0–0.9)
LYMPHOCYTES # BLD AUTO: 1.74 K/UL (ref 1–4.8)
LYMPHOCYTES NFR BLD: 14.7 % (ref 22–41)
MAGNESIUM SERPL-MCNC: 1.7 MG/DL (ref 1.5–2.5)
MCH RBC QN AUTO: 31.2 PG (ref 27–33)
MCHC RBC AUTO-ENTMCNC: 30.4 G/DL (ref 33.6–35)
MCV RBC AUTO: 102.6 FL (ref 81.4–97.8)
MONOCYTES # BLD AUTO: 1.25 K/UL (ref 0–0.85)
MONOCYTES NFR BLD AUTO: 10.6 % (ref 0–13.4)
NEUTROPHILS # BLD AUTO: 8.63 K/UL (ref 2–7.15)
NEUTROPHILS NFR BLD: 72.8 % (ref 44–72)
NRBC # BLD AUTO: 0.04 K/UL
NRBC BLD-RTO: 0.3 /100 WBC
PLATELET # BLD AUTO: 252 K/UL (ref 164–446)
PMV BLD AUTO: 10.9 FL (ref 9–12.9)
POTASSIUM SERPL-SCNC: 3.2 MMOL/L (ref 3.6–5.5)
POTASSIUM SERPL-SCNC: 3.4 MMOL/L (ref 3.6–5.5)
PROT SERPL-MCNC: 4.5 G/DL (ref 6–8.2)
RBC # BLD AUTO: 2.66 M/UL (ref 4.2–5.4)
SODIUM SERPL-SCNC: 139 MMOL/L (ref 135–145)
SODIUM SERPL-SCNC: 141 MMOL/L (ref 135–145)
WBC # BLD AUTO: 11.8 K/UL (ref 4.8–10.8)

## 2021-07-24 PROCEDURE — 770020 HCHG ROOM/CARE - TELE (206)

## 2021-07-24 PROCEDURE — 700102 HCHG RX REV CODE 250 W/ 637 OVERRIDE(OP): Performed by: STUDENT IN AN ORGANIZED HEALTH CARE EDUCATION/TRAINING PROGRAM

## 2021-07-24 PROCEDURE — 700102 HCHG RX REV CODE 250 W/ 637 OVERRIDE(OP): Performed by: INTERNAL MEDICINE

## 2021-07-24 PROCEDURE — 85025 COMPLETE CBC W/AUTO DIFF WBC: CPT

## 2021-07-24 PROCEDURE — A9270 NON-COVERED ITEM OR SERVICE: HCPCS | Performed by: FAMILY MEDICINE

## 2021-07-24 PROCEDURE — 700111 HCHG RX REV CODE 636 W/ 250 OVERRIDE (IP): Performed by: HOSPITALIST

## 2021-07-24 PROCEDURE — A9270 NON-COVERED ITEM OR SERVICE: HCPCS | Performed by: STUDENT IN AN ORGANIZED HEALTH CARE EDUCATION/TRAINING PROGRAM

## 2021-07-24 PROCEDURE — 700111 HCHG RX REV CODE 636 W/ 250 OVERRIDE (IP): Performed by: INTERNAL MEDICINE

## 2021-07-24 PROCEDURE — 80053 COMPREHEN METABOLIC PANEL: CPT

## 2021-07-24 PROCEDURE — 700111 HCHG RX REV CODE 636 W/ 250 OVERRIDE (IP): Performed by: STUDENT IN AN ORGANIZED HEALTH CARE EDUCATION/TRAINING PROGRAM

## 2021-07-24 PROCEDURE — 83735 ASSAY OF MAGNESIUM: CPT

## 2021-07-24 PROCEDURE — 99232 SBSQ HOSP IP/OBS MODERATE 35: CPT | Mod: GC | Performed by: HOSPITALIST

## 2021-07-24 PROCEDURE — 700102 HCHG RX REV CODE 250 W/ 637 OVERRIDE(OP): Performed by: FAMILY MEDICINE

## 2021-07-24 PROCEDURE — A9270 NON-COVERED ITEM OR SERVICE: HCPCS | Performed by: INTERNAL MEDICINE

## 2021-07-24 PROCEDURE — 80048 BASIC METABOLIC PNL TOTAL CA: CPT

## 2021-07-24 RX ORDER — POTASSIUM CHLORIDE 20 MEQ/1
20 TABLET, EXTENDED RELEASE ORAL ONCE
Status: DISCONTINUED | OUTPATIENT
Start: 2021-07-24 | End: 2021-07-24

## 2021-07-24 RX ORDER — OMEPRAZOLE 20 MG/1
CAPSULE, DELAYED RELEASE ORAL
Status: DISCONTINUED
Start: 2021-07-24 | End: 2021-07-24

## 2021-07-24 RX ORDER — POTASSIUM CHLORIDE 7.45 MG/ML
10 INJECTION INTRAVENOUS
Status: COMPLETED | OUTPATIENT
Start: 2021-07-24 | End: 2021-07-24

## 2021-07-24 RX ORDER — POTASSIUM CHLORIDE 20 MEQ/1
20 TABLET, EXTENDED RELEASE ORAL ONCE
Status: COMPLETED | OUTPATIENT
Start: 2021-07-24 | End: 2021-07-24

## 2021-07-24 RX ORDER — MAGNESIUM SULFATE HEPTAHYDRATE 40 MG/ML
2 INJECTION, SOLUTION INTRAVENOUS ONCE
Status: COMPLETED | OUTPATIENT
Start: 2021-07-24 | End: 2021-07-24

## 2021-07-24 RX ADMIN — HYDROCORTISONE 10 MG: 20 TABLET ORAL at 17:44

## 2021-07-24 RX ADMIN — MAGNESIUM SULFATE 2 G: 2 INJECTION INTRAVENOUS at 17:44

## 2021-07-24 RX ADMIN — HEPARIN SODIUM 5000 UNITS: 5000 INJECTION, SOLUTION INTRAVENOUS; SUBCUTANEOUS at 04:28

## 2021-07-24 RX ADMIN — DULOXETINE HYDROCHLORIDE 60 MG: 60 CAPSULE, DELAYED RELEASE ORAL at 20:30

## 2021-07-24 RX ADMIN — POTASSIUM CHLORIDE 40 MEQ: 1500 TABLET, EXTENDED RELEASE ORAL at 04:29

## 2021-07-24 RX ADMIN — LIOTHYRONINE SODIUM 25 MCG: 25 TABLET ORAL at 20:30

## 2021-07-24 RX ADMIN — POTASSIUM CHLORIDE 10 MEQ: 10 INJECTION, SOLUTION INTRAVENOUS at 13:30

## 2021-07-24 RX ADMIN — FLUDROCORTISONE ACETATE 0.2 MG: 0.1 TABLET ORAL at 04:29

## 2021-07-24 RX ADMIN — CARVEDILOL 6.25 MG: 6.25 TABLET, FILM COATED ORAL at 09:23

## 2021-07-24 RX ADMIN — AMITRIPTYLINE HYDROCHLORIDE 10 MG: 10 TABLET, FILM COATED ORAL at 04:29

## 2021-07-24 RX ADMIN — FLUDROCORTISONE ACETATE 0.2 MG: 0.1 TABLET ORAL at 17:43

## 2021-07-24 RX ADMIN — GABAPENTIN 400 MG: 400 CAPSULE ORAL at 17:43

## 2021-07-24 RX ADMIN — HYDROCORTISONE 20 MG: 20 TABLET ORAL at 04:28

## 2021-07-24 RX ADMIN — GABAPENTIN 400 MG: 400 CAPSULE ORAL at 04:29

## 2021-07-24 RX ADMIN — POTASSIUM CHLORIDE 10 MEQ: 10 INJECTION, SOLUTION INTRAVENOUS at 09:00

## 2021-07-24 RX ADMIN — ENOXAPARIN SODIUM 100 MG: 100 INJECTION SUBCUTANEOUS at 17:45

## 2021-07-24 RX ADMIN — COLESEVELAM HYDROCHLORIDE 625 MG: 625 TABLET, COATED ORAL at 15:24

## 2021-07-24 RX ADMIN — CALCITONIN SALMON 200 UNITS: 200 SPRAY, METERED NASAL at 20:35

## 2021-07-24 RX ADMIN — FENTANYL CITRATE 25 MCG: 50 INJECTION INTRAMUSCULAR; INTRAVENOUS at 16:58

## 2021-07-24 RX ADMIN — POTASSIUM CHLORIDE 20 MEQ: 20 TABLET, EXTENDED RELEASE ORAL at 19:00

## 2021-07-24 RX ADMIN — FENTANYL CITRATE 25 MCG: 50 INJECTION INTRAMUSCULAR; INTRAVENOUS at 08:47

## 2021-07-24 RX ADMIN — COLESEVELAM HYDROCHLORIDE 625 MG: 625 TABLET, COATED ORAL at 08:47

## 2021-07-24 RX ADMIN — LEVOTHYROXINE SODIUM 75 MCG: 0.07 TABLET ORAL at 04:28

## 2021-07-24 RX ADMIN — COLESEVELAM HYDROCHLORIDE 625 MG: 625 TABLET, COATED ORAL at 20:30

## 2021-07-24 RX ADMIN — Medication 2000 UNITS: at 04:29

## 2021-07-24 RX ADMIN — POTASSIUM CHLORIDE 10 MEQ: 7.46 INJECTION, SOLUTION INTRAVENOUS at 13:45

## 2021-07-24 RX ADMIN — FENTANYL CITRATE 25 MCG: 50 INJECTION INTRAMUSCULAR; INTRAVENOUS at 12:33

## 2021-07-24 RX ADMIN — CARVEDILOL 6.25 MG: 6.25 TABLET, FILM COATED ORAL at 16:58

## 2021-07-24 RX ADMIN — OMEPRAZOLE 40 MG: 20 CAPSULE, DELAYED RELEASE ORAL at 05:06

## 2021-07-24 RX ADMIN — POTASSIUM CHLORIDE 10 MEQ: 10 INJECTION, SOLUTION INTRAVENOUS at 08:48

## 2021-07-24 RX ADMIN — POTASSIUM CHLORIDE 40 MEQ: 1500 TABLET, EXTENDED RELEASE ORAL at 17:43

## 2021-07-24 ASSESSMENT — ENCOUNTER SYMPTOMS
SENSORY CHANGE: 0
BACK PAIN: 0
DIAPHORESIS: 0
HEADACHES: 0
SHORTNESS OF BREATH: 0
VOMITING: 0
SPEECH CHANGE: 0
SPUTUM PRODUCTION: 0
DOUBLE VISION: 0
NAUSEA: 0
BRUISES/BLEEDS EASILY: 0
PHOTOPHOBIA: 0
BLURRED VISION: 0
SORE THROAT: 0
HEARTBURN: 0
FEVER: 0
PALPITATIONS: 0
MYALGIAS: 0
TINGLING: 0
NECK PAIN: 0
ABDOMINAL PAIN: 0
DIZZINESS: 0
ORTHOPNEA: 0
DEPRESSION: 0
HEMOPTYSIS: 0
COUGH: 0

## 2021-07-24 ASSESSMENT — LIFESTYLE VARIABLES: SUBSTANCE_ABUSE: 0

## 2021-07-24 ASSESSMENT — FIBROSIS 4 INDEX: FIB4 SCORE: 2.05

## 2021-07-24 ASSESSMENT — PAIN DESCRIPTION - PAIN TYPE: TYPE: ACUTE PAIN

## 2021-07-24 NOTE — PROGRESS NOTES
Daily Progress Note:     Date of Service: 7/24/2021  Primary Team: [facility] Blue Team   Attending: Edson Santizo M.D.   Senior Resident: Dr. Bakari MD  Intern: Dr. Robert DO  Contact:  [phone]    Chief Complaint:   Cirrhosis, Orthostatic hypotension.    Subjective:  NO acute events overnight reported.  Patient states feeling better every day.  Now able to senia out of bed with PT/OT since 7/23, no lightheadedness was reported, but her BP was low.  Her labs improving daily. Normal Platelet count. Significant improvement in her LE edema but still having low potassium.      Consultants/Specialty:  GI  General surgery  ID    Review of Systems:     Review of Systems   Constitutional: Positive for malaise/fatigue. Negative for diaphoresis and fever.   HENT: Negative for congestion, ear discharge, sore throat and tinnitus.    Eyes: Negative for blurred vision, double vision and photophobia.   Respiratory: Negative for cough, hemoptysis, sputum production and shortness of breath.    Cardiovascular: Positive for leg swelling. Negative for chest pain, palpitations and orthopnea.   Gastrointestinal: Negative for abdominal pain, heartburn, nausea and vomiting.   Genitourinary: Negative for dysuria, frequency, hematuria and urgency.   Musculoskeletal: Negative for back pain, joint pain, myalgias and neck pain.   Skin: Negative for itching and rash.   Neurological: Negative for dizziness, tingling, sensory change, speech change and headaches.   Endo/Heme/Allergies: Does not bruise/bleed easily.   Psychiatric/Behavioral: Negative for depression, substance abuse and suicidal ideas.       Objective Data:   Physical Exam:   Vitals:   Temp:  [36.1 °C (97 °F)-36.9 °C (98.5 °F)] 36.5 °C (97.7 °F)  Pulse:  [83-88] 88  Resp:  [17-18] 17  BP: (111-154)/(63-95) 134/85  SpO2:  [93 %-99 %] 96 %    Physical Exam  Vitals reviewed.   Constitutional:       General: She is not in acute distress.     Appearance: She is ill-appearing. She is  not diaphoretic.   HENT:      Head: Normocephalic and atraumatic.      Nose: Nose normal. No congestion or rhinorrhea.      Mouth/Throat:      Mouth: Mucous membranes are moist.      Pharynx: Oropharynx is clear.   Eyes:      General: Scleral icterus present.      Extraocular Movements: Extraocular movements intact.      Conjunctiva/sclera: Conjunctivae normal.      Pupils: Pupils are equal, round, and reactive to light.   Cardiovascular:      Rate and Rhythm: Normal rate and regular rhythm.      Pulses: Normal pulses.      Heart sounds: Normal heart sounds. No murmur heard.     Pulmonary:      Effort: Pulmonary effort is normal. No respiratory distress.      Breath sounds: Normal breath sounds. No wheezing or rhonchi.   Abdominal:      General: Bowel sounds are normal. There is no distension.      Palpations: Abdomen is soft.      Tenderness: There is abdominal tenderness. There is no guarding or rebound.   Musculoskeletal:         General: No tenderness. Normal range of motion.      Cervical back: Normal range of motion and neck supple. No rigidity or tenderness.      Right lower leg: Edema present.      Left lower leg: Edema present.   Skin:     General: Skin is warm.      Capillary Refill: Capillary refill takes less than 2 seconds.      Coloration: Skin is jaundiced and pale.      Findings: Bruising present.   Neurological:      General: No focal deficit present.      Mental Status: She is alert and oriented to person, place, and time. Mental status is at baseline.      Cranial Nerves: No cranial nerve deficit.      Sensory: No sensory deficit.   Psychiatric:         Mood and Affect: Mood normal.         Behavior: Behavior normal.         Thought Content: Thought content normal.           Labs:   Review    Imaging:   Review    Problem Representation:   Donna  is a 56 y.o. y/o woman w/ PMHx DVT on eliquis, viral's dz w/ recurrent syncope, diverticulosis, HTN, hypothyroid, MI who presented on 7/9/2021  w/ hematochezia, new thrombocytopenia with decreased hemoglobin and concerns for DIC of unknown etiology vs liver pathology leading to thrombocytopenia. Thrombocytopenia resolved, rest of liver test improving daily. Patient still requiring K replacement.      * Thrombocytopenia (HCC)- (present on admission)  Assessment & Plan  - Initially concerns for DIC  - Viral and immunologic ruled out  - Most likely Drug induced (Hx of chronic Abx use for sinusitis)  - Improved after Transfusion  - Now back to normal limits  - Today 7/24 Platelet  count is 252  - CTM  - In the past several days on Lovenox DVT prophylaxis  - We will give weight base dose (Hx of PE)      Hypokalemia  Assessment & Plan  - Hypokalemic for most of her hospital stay  - She has required multiple times IV and Oral combine replacement  - Today 7/24 K 3.2  - Gave 30 mEq IV and continue 40 mEq BID orally  - Repeating BMP afternoon  - Daily Labs  - Replete as needed    Incontinence  Assessment & Plan  Complain of both urinary and fecal incontinence which is new in the setting of lower back pain and numbness across the lower back area that started 3 months ago after a fall.  We will check thoracic and lumbar spine CT.  Fall precautions.      Thrush  Assessment & Plan  Started on nystatin oral solution swish and swallow.  If no improvement clinically, could consider to add Diflucan.    Electrolyte imbalance  Assessment & Plan  Hyponatremia likely due to dehydration.  Judicial IV fluids considering chronic peripheral edema.    Epistaxis  Assessment & Plan  Suspecting due to nonhumidified oxygen through nose in the setting of thrombocytopenia.  Now resolved.  Consider humidified oxygen if needed while inpatient.    Lower GI bleed- (present on admission)  Assessment & Plan  Likely diverticular bleed in the light of thrombocytopenia and being on Eliquis per  Bradycardia has resolved with last bowel movement this a.m. was nonbloody.  H&H stable.  Continue to  monitor H&H.  Transfuse if hemoglobin drops below 7 g/dL.    Hemochromatosis  Assessment & Plan  History of PE  Follow-up with gastroenterology as an outpatient.    Now with worsening LFTs and increasing bilirubin likely due to starvation.  Continue to monitor for now.  Avoid hepatotoxic medications.    Recurrent syncope- (present on admission)  Assessment & Plan  Chronic for few years now was attributed to adrenal insufficiency.  Has been more frequent lately.  Start on stress dose Solu-Cortef as mentioned above.  Continue home dose midodrine.  Gentle IV fluid resuscitation.  Fall precautions.  - Likely Orthostatic Hypotension here in Hospital  - 7/23 able to stand up asymptomatic but her BP was low and borderline low times 2 with PT and OT  - Continue mobility daily out of bed times 2    History of pulmonary embolus (PE)- (present on admission)  Assessment & Plan  - After COVID-19 vaccination as per patient?.  - CTA of chest done on 4/27/2021 showed small left lower lobe subsegmental PE.    - However repeat CTA on 5/10/2021 showed resolution.  - Patient supposed to be on Eliquis for 3-month.    - Patient on Lovenox past few days since her Thrombocytopenia resolved  - Now Platelets at 252   - We will start Lovenox weight base due to Hx of PE   - Patient at risks of Bleeding and clotting   - We notified her about risks and benefits   - Patient OK with Medical Team starting Lovenox weight base  - CTM for Bleeding    Adrenal insufficiency ([place]'s disease) (HCC)- (present on admission)  Assessment & Plan  History of.  Normally on Cortef oral.  We will start stress dose Solu-Cortef considering patient has been having frequent syncopal episodes lately for now and then gradually wean down back to home dose oral Cortef.      Chronic sinusitis  Assessment & Plan  History of chronic MRSA sinusitis.  On Xerava per ID recommendations.  We will continue.  MRSA PCR pending.

## 2021-07-24 NOTE — CARE PLAN
Problem: Fall Risk  Goal: Patient will remain free from falls  Outcome: Progressing     Problem: Pain - Standard  Goal: Alleviation of pain or a reduction in pain to the patient’s comfort goal  Outcome: Progressing

## 2021-07-24 NOTE — PROGRESS NOTES
Daily Progress Note:     Date of Service: 7/23/2021  Primary Team: [facility] Blue Team   Attending: Edson Santizo M.D.   Senior Resident: Dr. Villegas  Intern: Dr. Jones  Contact:  [phone]    Chief Complaint:   Abdominal pain    Subjective:  No acute events overnight.  Pt reports feeling well today, some lower abdominal pain but denies any n/v/d.  Notes that her swelling continues to decrease.  No new episodes of syncope.      Consultants/Specialty:  General Surgery - [facility]  GI - [facility]  ID- [facility]    Review of Systems:    Review of Systems   Constitutional: Negative for chills and fever.   Respiratory: Negative for shortness of breath.    Cardiovascular: Negative for chest pain and palpitations.   Gastrointestinal: Positive for abdominal pain. Negative for diarrhea, nausea and vomiting.   Genitourinary: Negative for dysuria.   Musculoskeletal: Negative for myalgias.       Objective Data:   Physical Exam:   Vitals:   Temp:  [35.9 °C (96.6 °F)-37.3 °C (99.2 °F)] 36.3 °C (97.4 °F)  Pulse:  [73-85] 85  Resp:  [16-18] 18  BP: (103-146)/() 111/72  SpO2:  [94 %-96 %] 96 %    Physical Exam  Constitutional:       General: She is not in acute distress.     Appearance: She is not toxic-appearing.   HENT:      Head: Normocephalic and atraumatic.      Mouth/Throat:      Mouth: Mucous membranes are moist.      Pharynx: Oropharynx is clear.   Eyes:      General: Scleral icterus present.      Extraocular Movements: Extraocular movements intact.   Cardiovascular:      Rate and Rhythm: Normal rate and regular rhythm.      Pulses: Normal pulses.      Heart sounds: Normal heart sounds. No murmur heard.   No friction rub. No gallop.    Pulmonary:      Effort: Pulmonary effort is normal. No respiratory distress.      Breath sounds: Normal breath sounds. No wheezing, rhonchi or rales.   Abdominal:      General: Abdomen is flat. Bowel sounds are normal. There is no distension.      Palpations: Abdomen is soft.      Tenderness:  There is abdominal tenderness (LUQ). There is no guarding or rebound.   Musculoskeletal:         General: Swelling present.      Right lower leg: Edema present.      Left lower leg: Edema present.      Comments: 2+ pitting edema in B/l LE, improving today.   Skin:     General: Skin is warm and dry.      Coloration: Skin is not jaundiced.      Comments: Hematoma on left lateral lower leg.   Neurological:      General: No focal deficit present.      Mental Status: She is alert.   Psychiatric:         Mood and Affect: Mood normal.         Behavior: Behavior normal.           Labs:   HEMATOLOGY/ ONCOLOGY/ID:            Recent Labs     07/21/21  0340 07/22/21  0325 07/23/21  0330   WBC 11.4* 10.1 11.8*   RBC 3.13* 2.58* 2.78*   HEMOGLOBIN 9.7* 8.0* 8.6*   HEMATOCRIT 31.0* 25.7* 28.0*   MCV 99.0* 99.6* 100.7*   MCH 31.0 31.0 30.9   RDW 95.2* 94.7* 93.6*   PLATELETCT 93* 139* 228   MPV  --  12.5 12.6   NEUTSPOLYS 92.00* 77.80* 75.10*   LYMPHOCYTES 4.40* 12.50* 13.60*   MONOCYTES 2.70 8.60 9.90   EOSINOPHILS 0.00 0.10 0.20   BASOPHILS 0.90 0.30 0.20   RBCMORPHOLO Present  --   --      Lab Results   Component Value Date    EYBYBZHC06 3701 (H) 07/09/2021    KKNLEZTL87 528 09/18/2018    FOLATE 8.0 07/16/2021    FERRITIN 383.0 (H) 07/13/2021    FERRITIN 136.0 07/09/2021    FERRITIN 94.1 06/30/2021    IRON 106 07/13/2021    IRON 113 07/09/2021    IRON 136 06/30/2021    TOTIRONBC 123 (L) 07/13/2021    TOTIRONBC 130 (L) 07/09/2021    TOTIRONBC 153 (L) 06/30/2021       RENAL:        Estimated GFR/CRCL = Estimated Creatinine Clearance: 157.6 mL/min (A) (by C-G formula based on SCr of 0.46 mg/dL (L)).  Recent Labs     07/21/21  0340 07/21/21  0340 07/21/21  1822 07/22/21  0325 07/23/21  0330   SODIUM 142   < > 138 137 139   POTASSIUM 3.3*   < > 2.5* 3.1* 3.3*   CHLORIDE 97   < > 92* 94* 96   CO2 37*   < > 35* 36* 33   GLUCOSE 90   < > 97 110* 87   BUN 9   < > 8 9 11   CREATININE 0.38*   < > 0.50 0.48* 0.46*   CALCIUM 8.3*   < > 8.3*  7.6* 8.3*   MAGNESIUM 1.8  --   --  1.5 2.0   PHOSPHORUS  --   --   --  3.7  --    ALBUMIN 2.8*  --   --  2.4* 2.7*    < > = values in this interval not displayed.       GASTROINTESTINAL/ HEPATIC:          Recent Labs     07/21/21  0340 07/22/21 0325 07/23/21  0330   ALTSGPT 115* 93* 96*   ASTSGOT 160* 99* 97*   ALKPHOSPHAT 351* 277* 301*   TBILIRUBIN 7.5* 6.4* 6.2*   ALBUMIN 2.8* 2.4* 2.7*   GLOBULIN 1.6* 1.7* 2.0   MACROCYTOSIS 2+*  --   --      Lab Results   Component Value Date    AMMONIA 31 07/19/2021       ENDOCRINE:              Recent Labs     07/21/21  1822 07/22/21 0325 07/23/21  0330   GLUCOSE 97 110* 87     Lab Results   Component Value Date    HBA1C 6.0 (H) 06/29/2021    HBA1C 6.0 (H) 04/27/2021    HBA1C 6.0 (H) 06/19/2020    FREET4 1.27 06/29/2021    FREET4 1.40 06/16/2021    FREET4 1.16 06/15/2021    FREET3 2.46 05/12/2021    FREET3 3.10 12/09/2019    FREET3 4.28 (H) 10/18/2019    CORTISOL >63.4 (H) 06/22/2021    CORTISOL 5.1 06/17/2021    CORTISOL 2.7 05/25/2021       Imaging:   No new imaging overnight.    Problem Representation:  Donna Isaac  is a 56 y.o. y/o woman w/ PMHx DVT on eliquis, viral's dz w/ recurrent syncope, diverticulosis, HTN, hypothyroid, MI who presented on 7/9/2021 w/ hematochezia, new thrombocytopenia with decreased hemoglobin and concerns for DIC of unknown etiology vs liver pathology leading to thrombocytopenia.    * Thrombocytopenia (HCC)- (present on admission)  Assessment & Plan  Improving, platelets now steadily increasing w/ large spike overnight. Etiology still unknown at this time.  Extensive lab work-up has been used to rule-out many common causes.  Current suspected etiology is drug induced liver injury leading to thrombocytopenia, as she is on multiple medications and frequently in the hospital requiring the use of abx, recently requiring vanc and meropenem on 7/11.  In some case reports, DOACs have also contributed to thrombocytopenia, as well as has lasix.   ITP is unlikely, as she has had a good response to platelets during this admission.  DIC unlikely as she has never had a markedly decrease in fibrinogen or schistocytes seen on smear.  MRCP on 7/16 shows no intra or extrahepatic dilatation that would be concerning for cholestasis or obstruction.  -Consider Liver Bx pending recovery of platelets, per GI don't need bx as long as LFTs continue to resolve  -AM CBC  -Transfuse plt if <20k      Elevated liver enzymes  Assessment & Plan  Continues to improve.  Initially there was a cholestatic liver pattern, w/ mild elevations of AST and ALT, which have now peaked and are trending down.  Alk phos has peaked as well and is trending down.  This elevation in liver enzymes is likely 2/2 drug induced liver injury.  Although she is s/p cholecystectomy, there could have been some biliary sludge contributing to a cholestatic pattern.  MRCP on 6/17 shows no concern for cholestasis at this time.  Hepatitis A, B, C have been non-reactive, HIV negative, AMA, KINZA negative, celiac screen negative, HSV IgG elevated, EBV IgG elevated, CMV IgG negative, alpha 1 antitrypsin negative.  -Per GI, continue to follow LFTs - if daily improvement is not observed, please call GI back and will pursue liver biopsy at that time      Anasarca  Assessment & Plan  Pt anasarcic with pitting edema throughout both LE and in the UE as well.  This is likely multifactorial w/ components of heart failure leading to gut edema and acute exacerbation, adrenal insufficiency leading to electrolyte imbalances and liver disease all playing roles into it's development.  -Restart bumex 2 mg QD  -40 mEq Potassium PO BID while diuresing  -replete K and Mg as necessary    Recurrent syncope- (present on admission)  Assessment & Plan  See adrenal insufficiency for A+P.    Adrenal insufficiency ([place]'s disease) (HCC)- (present on admission)  Assessment & Plan  Pt w/ hx of viral's disease leading to recurrent syncopal  events that appear to be orthostatic.  She notes that she has been having episodes of syncope more frequently lately, and continues to have them while in bed.  We have continued her home meds here.  This will likely be an ongoing issue and will need further med titration outpatient to reduce the amount of syncopal events.  The continuation of these events while lying in bed is potentially due to receiving stress dose steroids earlier in the admission w/o enough of a taper for her chronic adrenal insufficiency.  Will continue to monitor for improvement.  Difficult balancing act between hypertension and maintaining high enough blood pressure to prevent orthostatic hypotension.  There is some concern that these episodes of passing out could also be seizures, as pt has a hx of seizure.  She hasn't taken any antiepileptics since 2009 and hasn't noticed any issues with seizures, she hasn't been followed by neurology since then.  She sometimes wakes up from passing out and is confused, which is concerning for post-ictal state.  While working w/ PT on 7/21, she had an episode while trying to stand, orthostatic blood pressures at that time were normal for laying and sitting, but w/ an episode of syncope this could be considered positive orthostatic hypotension.    -Carvedilol decreased to 6.25 mg PO BID  -Increase Fludrocortisone to 0.2 PO BID  -Hydrocortisone 20 mg in AM, 10 mg in PM daily  -PT consult    Acute kidney injury (HCC)  Assessment & Plan  Improving today.  New MARY LOU on 7/19.  Cr on 7/17 was 0.17, elevated up to 0.5 on 7/19.  This meets KDIGO criteria for elevation of 0.3 w/in 48 hours or 1.5x elevation over 7 days.  This is likely a prerenal process in the setting of decreased intravascular volume during diuresis.  Pt also w/ metabolic alkalosis, which is likely due to hypokalemia.  -AM CMP, continue to monitor    Hypokalemia  Assessment & Plan  Pt w/ worsening hypokalemia during diuresis.  Attempted repletion w/  80 mg IV today and 80 mg PO.  Hypokalemia is likely 2/2 to aggressive diuresis w/ bumex.  Hypokalemia will be rate limiting factor for diuresis.  Magnesium has also found to be low  -Repeat CMP AM  -Replete K and Mg as necessary    Lower GI bleed- (present on admission)  Assessment & Plan  3 episodes of hematochezia at rehab center, has resolved with no hematochezia or melena    History of pulmonary embolus (PE)- (present on admission)  Assessment & Plan  CTA of chest done on 4/27/2021 showed small left lower lobe subsegmental PE.  However repeat CTA on 5/10/2021 showed resolution.  Patient supposed to be on Eliquis for 3-month. Per vascular medicine she was not recommended to continue. Discontinued    Hypothyroidism- (present on admission)  Assessment & Plan  Hx of hypothyroidism.  Continue home Levothyroxine 75 mcg PO QD      Code Status: Full  DVT ppx: CI in the setting of low platelets  Diet: Cardiac diet  GI: Bowel regimen  T/L/D: CVC left IJV, reyna catheter      Minh Jones DO  PGY-1, [facility]    Assessment and plan discussed with senior resident and attending physician.

## 2021-07-24 NOTE — ASSESSMENT & PLAN NOTE
- Hypokalemic for most of her hospital stay  - She has required multiple times IV and Oral combine replacement  - Today 7/24 K 3.2  - Gave 30 mEq IV and continue 40 mEq BID orally  - Repeating BMP afternoon  - Daily Labs  - Replete as needed

## 2021-07-24 NOTE — PROGRESS NOTES
4 Eyes Skin Assessment Completed by AILYN Clark and AILYN Samayoa.        Head Jaundice  Ears Blanching  Nose Blanching  Neck Blanching, Bruising and Jaundice  Breast/Chest Bruising, Jaundice and Edema  Shoulder Blades Blanching, bruising   Spine Blanching, bruising  (R) Arm/Elbow/Hand Blanching, Bruising, Abrasion, Swelling, Weeping and Edema  (L) Arm/Elbow/Hand Blanching, Bruising, Abrasion, Scab, Weeping and Edema  Abdomen Blanching, Scab and Bruising  Groin Redness, Blanching, Excoriation, Rash and Swelling  Scrotum/Coccyx/Buttocks Redness, reyna in place, barrier cream used. Excoriation and Moisture Fissure  (R) Leg Blanching, Bruising, Swelling, Edema  (L) Leg Blanching, Bruising, Swelling, Edema  (R) Heel/Foot/Toe Redness, Blanching, Boggy, Swelling and Edema  (L) Heel/Foot/Toe Redness, Blanching, Boggy, Bruising, Swelling, Scab and Edema              Devices In Places Tele Box, Central Line, indwelling catheter        Interventions In Place Heel Mepilex, Waffle Overlay, Pillows, Q2 Turns, frequent linen changes to ensure pt stays dry, Barrier Cream, Dri-Jack Pads, Heels Loaded W/Pillows and Pressure Redistribution Mattress

## 2021-07-24 NOTE — CARE PLAN
The patient is Stable - Low risk of patient condition declining or worsening    Shift Goals  Clinical Goals: pain management  Patient Goals: up out of bed

## 2021-07-25 PROBLEM — R55 RECURRENT SYNCOPE: Status: RESOLVED | Noted: 2021-06-29 | Resolved: 2021-07-25

## 2021-07-25 LAB
ANION GAP SERPL CALC-SCNC: 9 MMOL/L (ref 7–16)
BACTERIA BLD CULT: NORMAL
BACTERIA BLD CULT: NORMAL
BUN SERPL-MCNC: 12 MG/DL (ref 8–22)
CALCIUM SERPL-MCNC: 8.2 MG/DL (ref 8.5–10.5)
CHLORIDE SERPL-SCNC: 108 MMOL/L (ref 96–112)
CO2 SERPL-SCNC: 29 MMOL/L (ref 20–33)
CREAT SERPL-MCNC: 0.67 MG/DL (ref 0.5–1.4)
FIBRINOGEN PPP-MCNC: 262 MG/DL (ref 215–460)
GLUCOSE SERPL-MCNC: 93 MG/DL (ref 65–99)
MAGNESIUM SERPL-MCNC: 2.1 MG/DL (ref 1.5–2.5)
POTASSIUM SERPL-SCNC: 3.7 MMOL/L (ref 3.6–5.5)
SIGNIFICANT IND 70042: NORMAL
SIGNIFICANT IND 70042: NORMAL
SITE SITE: NORMAL
SITE SITE: NORMAL
SODIUM SERPL-SCNC: 146 MMOL/L (ref 135–145)
SOURCE SOURCE: NORMAL
SOURCE SOURCE: NORMAL

## 2021-07-25 PROCEDURE — 80048 BASIC METABOLIC PNL TOTAL CA: CPT

## 2021-07-25 PROCEDURE — 83735 ASSAY OF MAGNESIUM: CPT

## 2021-07-25 PROCEDURE — A9270 NON-COVERED ITEM OR SERVICE: HCPCS | Performed by: FAMILY MEDICINE

## 2021-07-25 PROCEDURE — 700102 HCHG RX REV CODE 250 W/ 637 OVERRIDE(OP): Performed by: STUDENT IN AN ORGANIZED HEALTH CARE EDUCATION/TRAINING PROGRAM

## 2021-07-25 PROCEDURE — A9270 NON-COVERED ITEM OR SERVICE: HCPCS | Performed by: INTERNAL MEDICINE

## 2021-07-25 PROCEDURE — 99232 SBSQ HOSP IP/OBS MODERATE 35: CPT | Mod: GC | Performed by: HOSPITALIST

## 2021-07-25 PROCEDURE — A9270 NON-COVERED ITEM OR SERVICE: HCPCS | Performed by: STUDENT IN AN ORGANIZED HEALTH CARE EDUCATION/TRAINING PROGRAM

## 2021-07-25 PROCEDURE — 700102 HCHG RX REV CODE 250 W/ 637 OVERRIDE(OP): Performed by: INTERNAL MEDICINE

## 2021-07-25 PROCEDURE — 83010 ASSAY OF HAPTOGLOBIN QUANT: CPT

## 2021-07-25 PROCEDURE — 700102 HCHG RX REV CODE 250 W/ 637 OVERRIDE(OP): Performed by: FAMILY MEDICINE

## 2021-07-25 PROCEDURE — 85384 FIBRINOGEN ACTIVITY: CPT

## 2021-07-25 PROCEDURE — 700111 HCHG RX REV CODE 636 W/ 250 OVERRIDE (IP): Performed by: STUDENT IN AN ORGANIZED HEALTH CARE EDUCATION/TRAINING PROGRAM

## 2021-07-25 PROCEDURE — 700111 HCHG RX REV CODE 636 W/ 250 OVERRIDE (IP): Performed by: INTERNAL MEDICINE

## 2021-07-25 PROCEDURE — 770020 HCHG ROOM/CARE - TELE (206)

## 2021-07-25 PROCEDURE — 97530 THERAPEUTIC ACTIVITIES: CPT

## 2021-07-25 RX ADMIN — CALCITONIN SALMON 200 UNITS: 200 SPRAY, METERED NASAL at 20:23

## 2021-07-25 RX ADMIN — OMEPRAZOLE 40 MG: 20 CAPSULE, DELAYED RELEASE ORAL at 06:12

## 2021-07-25 RX ADMIN — LEVOTHYROXINE SODIUM 75 MCG: 0.07 TABLET ORAL at 06:14

## 2021-07-25 RX ADMIN — FENTANYL CITRATE 50 MCG: 50 INJECTION INTRAMUSCULAR; INTRAVENOUS at 21:43

## 2021-07-25 RX ADMIN — COLESEVELAM HYDROCHLORIDE 625 MG: 625 TABLET, COATED ORAL at 09:00

## 2021-07-25 RX ADMIN — GABAPENTIN 400 MG: 400 CAPSULE ORAL at 06:14

## 2021-07-25 RX ADMIN — FLUDROCORTISONE ACETATE 0.2 MG: 0.1 TABLET ORAL at 16:44

## 2021-07-25 RX ADMIN — FLUDROCORTISONE ACETATE 0.2 MG: 0.1 TABLET ORAL at 06:13

## 2021-07-25 RX ADMIN — FENTANYL CITRATE 50 MCG: 50 INJECTION INTRAMUSCULAR; INTRAVENOUS at 18:16

## 2021-07-25 RX ADMIN — ENOXAPARIN SODIUM 100 MG: 100 INJECTION SUBCUTANEOUS at 16:44

## 2021-07-25 RX ADMIN — CARVEDILOL 6.25 MG: 6.25 TABLET, FILM COATED ORAL at 16:43

## 2021-07-25 RX ADMIN — GABAPENTIN 400 MG: 400 CAPSULE ORAL at 16:43

## 2021-07-25 RX ADMIN — HYDROCORTISONE 10 MG: 20 TABLET ORAL at 16:43

## 2021-07-25 RX ADMIN — Medication 2000 UNITS: at 06:13

## 2021-07-25 RX ADMIN — DULOXETINE HYDROCHLORIDE 60 MG: 60 CAPSULE, DELAYED RELEASE ORAL at 20:23

## 2021-07-25 RX ADMIN — HYDROCORTISONE 20 MG: 20 TABLET ORAL at 06:13

## 2021-07-25 RX ADMIN — LIOTHYRONINE SODIUM 25 MCG: 25 TABLET ORAL at 20:23

## 2021-07-25 RX ADMIN — COLESEVELAM HYDROCHLORIDE 625 MG: 625 TABLET, COATED ORAL at 20:22

## 2021-07-25 RX ADMIN — ENOXAPARIN SODIUM 100 MG: 100 INJECTION SUBCUTANEOUS at 06:14

## 2021-07-25 RX ADMIN — COLESEVELAM HYDROCHLORIDE 625 MG: 625 TABLET, COATED ORAL at 15:13

## 2021-07-25 RX ADMIN — FENTANYL CITRATE 25 MCG: 50 INJECTION INTRAMUSCULAR; INTRAVENOUS at 11:34

## 2021-07-25 RX ADMIN — AMITRIPTYLINE HYDROCHLORIDE 10 MG: 10 TABLET, FILM COATED ORAL at 06:13

## 2021-07-25 RX ADMIN — POTASSIUM CHLORIDE 40 MEQ: 1500 TABLET, EXTENDED RELEASE ORAL at 06:12

## 2021-07-25 RX ADMIN — POTASSIUM CHLORIDE 40 MEQ: 1500 TABLET, EXTENDED RELEASE ORAL at 16:43

## 2021-07-25 RX ADMIN — FENTANYL CITRATE 25 MCG: 50 INJECTION INTRAMUSCULAR; INTRAVENOUS at 15:20

## 2021-07-25 RX ADMIN — CARVEDILOL 6.25 MG: 6.25 TABLET, FILM COATED ORAL at 09:00

## 2021-07-25 RX ADMIN — BUMETANIDE 2 MG: 1 TABLET ORAL at 06:13

## 2021-07-25 ASSESSMENT — PAIN DESCRIPTION - PAIN TYPE
TYPE: ACUTE PAIN

## 2021-07-25 ASSESSMENT — COGNITIVE AND FUNCTIONAL STATUS - GENERAL
SUGGESTED CMS G CODE MODIFIER MOBILITY: CM
MOVING FROM LYING ON BACK TO SITTING ON SIDE OF FLAT BED: UNABLE
TURNING FROM BACK TO SIDE WHILE IN FLAT BAD: A LOT
MOVING TO AND FROM BED TO CHAIR: A LOT
CLIMB 3 TO 5 STEPS WITH RAILING: TOTAL
MOBILITY SCORE: 8
STANDING UP FROM CHAIR USING ARMS: TOTAL
WALKING IN HOSPITAL ROOM: TOTAL

## 2021-07-25 ASSESSMENT — ENCOUNTER SYMPTOMS
FEVER: 0
VOMITING: 0
MYALGIAS: 0
DIARRHEA: 0
ABDOMINAL PAIN: 1
PALPITATIONS: 0
CHILLS: 0
SHORTNESS OF BREATH: 0
NAUSEA: 0

## 2021-07-25 ASSESSMENT — FIBROSIS 4 INDEX: FIB4 SCORE: 2.05

## 2021-07-25 ASSESSMENT — GAIT ASSESSMENTS: GAIT LEVEL OF ASSIST: UNABLE TO PARTICIPATE

## 2021-07-25 NOTE — PROGRESS NOTES
Bedside report received. Patient A&O x4. LINA.  on the monitor.  POC discussed with patient. Patient verbalized understanding. Call light and belongings within reach. Bed locked and in lowest position, alarm and fall precautions in place.

## 2021-07-25 NOTE — THERAPY
"Physical Therapy   Daily Treatment     Patient Name: Donna Isaac  Age:  56 y.o., Sex:  female  Medical Record #: [id number]  Today's Date: 7/25/2021     Precautions: Fall Risk    Assessment    Patient is motivated to work with PT today. She reports feeling more fatigued today and she is unable to complete a STS with MaxAx2.  She demos proximal muscle weakness and impaired activity tolerance.  Attempted STS w/sumit mccartney x2 to further facilitate OOB tolerance and mobility. However, patient unable to come to a full stand. Will continue to follow for further skilled therapy.     Plan    Continue current treatment plan.    DC Equipment Recommendations: Unable to determine at this time  Discharge Recommendations: Recommend post-acute placement for additional physical therapy services prior to discharge home      Subjective    \"I want to try\"     Objective       07/25/21 1430   Precautions   Precautions Fall Risk   Comments hx of syncope    Pain 0 - 10 Group   Therapist Pain Assessment Post Activity Pain Same as Prior to Activity;0   Cognition    Cognition / Consciousness WDL   Level of Consciousness Alert   Comments pleasant, cooperative and motivated    Passive ROM Lower Body   Passive ROM Lower Body WDL   Active ROM Lower Body    Active ROM Lower Body  WDL   Strength Lower Body   Lower Body Strength  X   Comments B hip flexors 3-/5, B knee extensors 3/5. Proximal weakness    Sensation Lower Body   Lower Extremity Sensation   WDL   Supine Lower Body Exercise   Supine Lower Body Exercises Yes   Ankle Pumps 1 set of 10;Bilateral   Gluteal Isometrics 1 set of 10;Bilateral   Quadriceps Isometrics 1 set of 10;Bilateral   Comments pre-mobility warm up    Neuro-Muscular Treatments   Neuro-Muscular Treatments Postural Facilitation;Verbal Cuing;Anterior weight shift;Tactile Cuing   Other Treatments   Other Treatments Provided Attempted STSx5, twice with sumit Mccartney. Patient unable to clear her bottom from the EOB    Balance "   Sitting Balance (Static) Fair   Sitting Balance (Dynamic) Fair   Weight Shift Sitting Fair   Skilled Intervention Tactile Cuing;Verbal Cuing;Postural Facilitation   Gait Analysis   Gait Level Of Assist Unable to Participate   Bed Mobility    Supine to Sit Moderate Assist   Sit to Supine Maximal Assist   Scooting Moderate Assist   Skilled Intervention Tactile Cuing;Verbal Cuing   Functional Mobility   Sit to Stand Maximal Assist   Bed, Chair, Wheelchair Transfer Unable to Participate   Toilet Transfers Unable to Participate   Skilled Intervention Verbal Cuing;Tactile Cuing   Comments Attempted STS x5, unable to clear buttocks from EOB with MaxAx2    How much difficulty does the patient currently have...   Turning over in bed (including adjusting bedclothes, sheets and blankets)? 2   Sitting down on and standing up from a chair with arms (e.g., wheelchair, bedside commode, etc.) 1   Moving from lying on back to sitting on the side of the bed? 2   How much help from another person does the patient currently need...   Moving to and from a bed to a chair (including a wheelchair)? 1   Need to walk in a hospital room? 1   Climbing 3-5 steps with a railing? 1   6 clicks Mobility Score 8   Activity Tolerance   Sitting in Chair NT   Sitting Edge of Bed 15 min   Standing unable    Comments no syncopal episodes, max fatigue at end of session    Short Term Goals    Short Term Goal # 1 Pt will be able to perform sit<>stand/transfers with FWW with Spv in 6tx to promote fnx progression towards I    Goal Outcome # 1 goal not met   Short Term Goal # 2 Pt will be able to ambulate 150ft with FWW with Spv in 6tx to promote fnx progression towards I    Goal Outcome # 2 Goal not met   Anticipated Discharge Equipment and Recommendations   DC Equipment Recommendations Unable to determine at this time   Discharge Recommendations Recommend post-acute placement for additional physical therapy services prior to discharge home       Cristiane  Rushs, PT, DPT, [facility]

## 2021-07-25 NOTE — CARE PLAN
The patient is Watcher - Medium risk of patient condition declining or worsening    Shift Goals  Clinical Goals: pain management  Patient Goals: up out of bed     Progress made toward(s) clinical / shift goals:  Progressing.    Patient is not progressing towards the following goals:      Problem: Knowledge Deficit - Standard  Goal: Patient and family/care givers will demonstrate understanding of plan of care, disease process/condition, diagnostic tests and medications  Outcome: Progressing     Problem: Fall Risk  Goal: Patient will remain free from falls  Outcome: Progressing     Problem: Pain - Standard  Goal: Alleviation of pain or a reduction in pain to the patient’s comfort goal  Outcome: Progressing

## 2021-07-25 NOTE — PROGRESS NOTES
Daily Progress Note:     Date of Service: 7/25/2021  Primary Team: [employer] Blue Team   Attending: Edson Santizo M.D.   Senior Resident: Dr. Villegas  Intern: Dr. Jones  Contact:  [phone]    Chief Complaint:   Abdominal pain    Subjective:  No acute events overnight.  Pt reports feeling well today, still having some mild abdominal pain.  Tolerating food w/o increasing abdominal pain, nausea, vomiting.  She was able to get up and sit in the chair yesterday for about 45 minutes w/o any episodes of syncope or hypotension.  Tolerated ambulating from bed to chair with assistance well.  This is the first time she's been able to get up w/o issues since admission.      Consultants/Specialty:  General Surgery - [facility]  GI - [facility]  ID- [facility]    Review of Systems:    Review of Systems   Constitutional: Negative for chills and fever.   Respiratory: Negative for shortness of breath.    Cardiovascular: Negative for chest pain and palpitations.   Gastrointestinal: Positive for abdominal pain. Negative for diarrhea, nausea and vomiting.   Genitourinary: Negative for dysuria.   Musculoskeletal: Negative for myalgias.       Objective Data:   Physical Exam:   Vitals:   Temp:  [36 °C (96.8 °F)-36.7 °C (98.1 °F)] 36.1 °C (97 °F)  Pulse:  [] 103  Resp:  [16-17] 17  BP: (125-171)/() 148/95  SpO2:  [95 %-98 %] 97 %    Physical Exam  Constitutional:       General: She is not in acute distress.     Appearance: She is not toxic-appearing.   HENT:      Head: Normocephalic and atraumatic.      Mouth/Throat:      Mouth: Mucous membranes are moist.      Pharynx: Oropharynx is clear.   Eyes:      General: Scleral icterus (improving, mild now) present.      Extraocular Movements: Extraocular movements intact.   Cardiovascular:      Rate and Rhythm: Normal rate and regular rhythm.      Pulses: Normal pulses.      Heart sounds: Normal heart sounds. No murmur heard.   No friction rub. No gallop.    Pulmonary:      Effort: Pulmonary  effort is normal. No respiratory distress.      Breath sounds: Normal breath sounds. No wheezing, rhonchi or rales.   Abdominal:      General: Abdomen is flat. Bowel sounds are normal. There is no distension.      Palpations: Abdomen is soft.      Tenderness: There is abdominal tenderness (LUQ). There is no guarding or rebound.   Musculoskeletal:         General: Swelling present.      Right lower leg: Edema present.      Left lower leg: Edema present.      Comments: 2+ pitting edema in B/l LE, Continues to improve.  UE edema continues to improve.     Skin:     General: Skin is warm and dry.      Coloration: Skin is not jaundiced.   Neurological:      General: No focal deficit present.      Mental Status: She is alert.   Psychiatric:         Mood and Affect: Mood normal.         Behavior: Behavior normal.           Labs:   HEMATOLOGY/ ONCOLOGY/ID:            Recent Labs     07/23/21  0330 07/24/21  0519   WBC 11.8* 11.8*   RBC 2.78* 2.66*   HEMOGLOBIN 8.6* 8.3*   HEMATOCRIT 28.0* 27.3*   .7* 102.6*   MCH 30.9 31.2   RDW 93.6* 93.3*   PLATELETCT 228 252   MPV 12.6 10.9   NEUTSPOLYS 75.10* 72.80*   LYMPHOCYTES 13.60* 14.70*   MONOCYTES 9.90 10.60   EOSINOPHILS 0.20 0.20   BASOPHILS 0.20 0.30     Lab Results   Component Value Date    BJCNGOSC70 3701 (H) 07/09/2021    TRIDRRIM89 528 09/18/2018    FOLATE 8.0 07/16/2021    FERRITIN 383.0 (H) 07/13/2021    FERRITIN 136.0 07/09/2021    FERRITIN 94.1 06/30/2021    IRON 106 07/13/2021    IRON 113 07/09/2021    IRON 136 06/30/2021    TOTIRONBC 123 (L) 07/13/2021    TOTIRONBC 130 (L) 07/09/2021    TOTIRONBC 153 (L) 06/30/2021       RENAL:        Estimated GFR/CRCL = Estimated Creatinine Clearance: 108.2 mL/min (by C-G formula based on SCr of 0.67 mg/dL).  Recent Labs     07/23/21  0330 07/23/21  0330 07/24/21  0519 07/24/21  1604 07/25/21  0300   SODIUM 139   < > 141 139 146*   POTASSIUM 3.3*   < > 3.2* 3.4* 3.7   CHLORIDE 96   < > 100 101 108   CO2 33   < > 32 27 29    GLUCOSE 87   < > 90 113* 93   BUN 11   < > 12 11 12   CREATININE 0.46*   < > 0.65 0.83 0.67   CALCIUM 8.3*   < > 8.1* 8.2* 8.2*   MAGNESIUM 2.0  --   --  1.7 2.1   ALBUMIN 2.7*  --  2.6*  --   --     < > = values in this interval not displayed.       GASTROINTESTINAL/ HEPATIC:          Recent Labs     07/23/21  0330 07/24/21  0519   ALTSGPT 96* 90*   ASTSGOT 97* 86*   ALKPHOSPHAT 301* 257*   TBILIRUBIN 6.2* 5.2*   ALBUMIN 2.7* 2.6*   GLOBULIN 2.0 1.9     Lab Results   Component Value Date    AMMONIA 31 07/19/2021       ENDOCRINE:              Recent Labs     07/24/21  0519 07/24/21  1604 07/25/21  0300   GLUCOSE 90 113* 93     Lab Results   Component Value Date    HBA1C 6.0 (H) 06/29/2021    HBA1C 6.0 (H) 04/27/2021    HBA1C 6.0 (H) 06/19/2020    FREET4 1.27 06/29/2021    FREET4 1.40 06/16/2021    FREET4 1.16 06/15/2021    FREET3 2.46 05/12/2021    FREET3 3.10 12/09/2019    FREET3 4.28 (H) 10/18/2019    CORTISOL >63.4 (H) 06/22/2021    CORTISOL 5.1 06/17/2021    CORTISOL 2.7 05/25/2021       Imaging:   No new imaging overnight.      Problem Representation:  Donna Isaac  is a 56 y.o. y/o woman w/ PMHx DVT on eliquis, viral's dz w/ recurrent syncope, diverticulosis, HTN, hypothyroid, MI who presented on 7/9/2021 w/ hematochezia, new thrombocytopenia with decreased hemoglobin likely 2/2 DILI.  Anasarca improving, tolerating current diuresis regimen well w/ stable potassium.  No new syncopal events in previous 48 hours, since increase in fludrocortisone, blood pressures tolerating increase well.      * Anasarca  Assessment & Plan  Pt anasarcic with pitting edema throughout both LE and in the UE as well.  This is likely multifactorial w/ components of heart failure leading to gut edema and acute exacerbation, adrenal insufficiency leading to electrolyte imbalances and liver disease all playing roles into it's development.  -Restart bumex 2 mg QD  -40 mEq Potassium PO BID while diuresing  -replete K and Mg as  necessary  -Encourage ambulating from bed to chair as tolerated.  -Continue PT/OT treatment  -Initially noticed RLE increased edema over LLE, can consider DVT-US of right leg pending further improvement of edema.      Thrombocytopenia (HCC)- (present on admission)  Assessment & Plan  Resolved. Etiology still unknown at this time.  Extensive lab work-up has been used to rule-out many common causes.  Current suspected etiology is drug induced liver injury leading to thrombocytopenia, as she is on multiple medications and frequently in the hospital requiring the use of abx, recently requiring vanc and meropenem on 7/11.  In some case reports, DOACs have also contributed to thrombocytopenia, as well as has lasix.  ITP is unlikely, as she has had a good response to platelets during this admission.  DIC unlikely as she has never had a markedly decrease in fibrinogen or schistocytes seen on smear.  MRCP on 7/16 shows no intra or extrahepatic dilatation that would be concerning for cholestasis or obstruction.  -Consider Liver Bx pending recovery of platelets, per GI don't need bx as long as LFTs continue to resolve  -AM CBC  -Transfuse plt if <20k      Adrenal insufficiency (Dalton's disease) (HCC)- (present on admission)  Assessment & Plan  Pt w/ hx of viral's disease leading to recurrent syncopal events that appear to be orthostatic.  She notes that she has been having episodes of syncope more frequently lately, and continues to have them while in bed.  We have continued her home meds here.  This will likely be an ongoing issue and will need further med titration outpatient to reduce the amount of syncopal events.  The continuation of these events while lying in bed is potentially due to receiving stress dose steroids earlier in the admission w/o enough of a taper for her chronic adrenal insufficiency.  Will continue to monitor for improvement.  Difficult balancing act between hypertension and maintaining high enough  blood pressure to prevent orthostatic hypotension.  There is some concern that these episodes of passing out could also be seizures, as pt has a hx of seizure.  She hasn't taken any antiepileptics since 2009 and hasn't noticed any issues with seizures, she hasn't been followed by neurology since then.  She sometimes wakes up from passing out and is confused, which is concerning for post-ictal state.  While working w/ PT on 7/21, she had an episode while trying to stand, orthostatic blood pressures at that time were normal for laying and sitting, but w/ an episode of syncope this could be considered positive orthostatic hypotension.  Improving blood pressure with increase in fludrocortisone, no issues w/ syncope or orthostasis.  -Carvedilol decreased to 6.25 mg PO BID  -Increase Fludrocortisone to 0.2 PO BID  -Hydrocortisone 20 mg in AM, 10 mg in PM daily    Elevated liver enzymes  Assessment & Plan  Continues to improve.  Initially there was a cholestatic liver pattern, w/ mild elevations of AST and ALT, which have now peaked and are trending down.  Alk phos has peaked as well and is trending down.  This elevation in liver enzymes is likely 2/2 drug induced liver injury.  Although she is s/p cholecystectomy, there could have been some biliary sludge contributing to a cholestatic pattern.  MRCP on 6/17 shows no concern for cholestasis at this time.  Hepatitis A, B, C have been non-reactive, HIV negative, AMA, KINZA negative, celiac screen negative, HSV IgG elevated, EBV IgG elevated, CMV IgG negative, alpha 1 antitrypsin negative.  -Per GI, continue to follow LFTs - if daily improvement is not observed, please call GI back and will pursue liver biopsy at that time      Acute kidney injury (HCC)  Assessment & Plan  Improving today.  New MARY LOU on 7/19.  Cr on 7/17 was 0.17, elevated up to 0.5 on 7/19.  This meets KDIGO criteria for elevation of 0.3 w/in 48 hours or 1.5x elevation over 7 days.  This is likely a prerenal  process in the setting of decreased intravascular volume during diuresis.  Pt also w/ metabolic alkalosis, which is likely due to hypokalemia.  -AM CMP, continue to monitor    Hypokalemia  Assessment & Plan  Pt w/ worsening hypokalemia during diuresis.  Attempted repletion w/ 80 mg IV today and 80 mg PO.  Hypokalemia is likely 2/2 to aggressive diuresis w/ bumex.  Hypokalemia will be rate limiting factor for diuresis.  Magnesium has also found to be low  -Repeat CMP AM  -Replete K and Mg as necessary    Lower GI bleed- (present on admission)  Assessment & Plan  3 episodes of hematochezia at rehab center, has resolved with no hematochezia or melena    History of pulmonary embolus (PE)- (present on admission)  Assessment & Plan  CTA of chest done on 4/27/2021 showed small left lower lobe subsegmental PE.  However repeat CTA on 5/10/2021 showed resolution.  Patient supposed to be on Eliquis for 3-month. Per vascular medicine she was not recommended to continue. Discontinued    Hypothyroidism- (present on admission)  Assessment & Plan  Hx of hypothyroidism.  Continue home Levothyroxine 75 mcg PO QD      Code Status: Full  DVT ppx: CI in the setting of low platelets  Diet: Cardiac diet  GI: Bowel regimen  T/L/D: CVC left IJV, reyna catheter      Minh Jones DO  PGY-1, [facility]    Assessment and plan discussed with senior resident and attending physician.

## 2021-07-26 LAB
ALBUMIN SERPL BCP-MCNC: 2.8 G/DL (ref 3.2–4.9)
ALBUMIN/GLOB SERPL: 1.3 G/DL
ALP SERPL-CCNC: 238 U/L (ref 30–99)
ALT SERPL-CCNC: 84 U/L (ref 2–50)
ANION GAP SERPL CALC-SCNC: 10 MMOL/L (ref 7–16)
ANION GAP SERPL CALC-SCNC: 13 MMOL/L (ref 7–16)
ANION GAP SERPL CALC-SCNC: 17 MMOL/L (ref 7–16)
AST SERPL-CCNC: 70 U/L (ref 12–45)
BILIRUB SERPL-MCNC: 4.2 MG/DL (ref 0.1–1.5)
BUN SERPL-MCNC: 8 MG/DL (ref 8–22)
BUN SERPL-MCNC: 8 MG/DL (ref 8–22)
BUN SERPL-MCNC: 9 MG/DL (ref 8–22)
C DIFF DNA SPEC QL NAA+PROBE: NEGATIVE
C DIFF TOX GENS STL QL NAA+PROBE: NEGATIVE
CALCIUM SERPL-MCNC: 7.9 MG/DL (ref 8.5–10.5)
CALCIUM SERPL-MCNC: 8.2 MG/DL (ref 8.5–10.5)
CALCIUM SERPL-MCNC: 8.4 MG/DL (ref 8.5–10.5)
CHLORIDE SERPL-SCNC: 103 MMOL/L (ref 96–112)
CHLORIDE SERPL-SCNC: 106 MMOL/L (ref 96–112)
CHLORIDE SERPL-SCNC: 106 MMOL/L (ref 96–112)
CO2 SERPL-SCNC: 26 MMOL/L (ref 20–33)
CO2 SERPL-SCNC: 27 MMOL/L (ref 20–33)
CO2 SERPL-SCNC: 29 MMOL/L (ref 20–33)
CREAT SERPL-MCNC: 0.55 MG/DL (ref 0.5–1.4)
CREAT SERPL-MCNC: 0.6 MG/DL (ref 0.5–1.4)
CREAT SERPL-MCNC: 0.63 MG/DL (ref 0.5–1.4)
EKG IMPRESSION: NORMAL
ERYTHROCYTE [DISTWIDTH] IN BLOOD BY AUTOMATED COUNT: 93 FL (ref 35.9–50)
GLOBULIN SER CALC-MCNC: 2.1 G/DL (ref 1.9–3.5)
GLUCOSE SERPL-MCNC: 101 MG/DL (ref 65–99)
GLUCOSE SERPL-MCNC: 186 MG/DL (ref 65–99)
GLUCOSE SERPL-MCNC: 84 MG/DL (ref 65–99)
HCT VFR BLD AUTO: 29.1 % (ref 37–47)
HGB BLD-MCNC: 8.7 G/DL (ref 12–16)
MAGNESIUM SERPL-MCNC: 2.1 MG/DL (ref 1.5–2.5)
MCH RBC QN AUTO: 31.8 PG (ref 27–33)
MCHC RBC AUTO-ENTMCNC: 29.9 G/DL (ref 33.6–35)
MCV RBC AUTO: 106.2 FL (ref 81.4–97.8)
PLATELET # BLD AUTO: 271 K/UL (ref 164–446)
PMV BLD AUTO: 10.7 FL (ref 9–12.9)
POTASSIUM SERPL-SCNC: 2.6 MMOL/L (ref 3.6–5.5)
POTASSIUM SERPL-SCNC: 2.7 MMOL/L (ref 3.6–5.5)
POTASSIUM SERPL-SCNC: 3.8 MMOL/L (ref 3.6–5.5)
PROT SERPL-MCNC: 4.9 G/DL (ref 6–8.2)
RBC # BLD AUTO: 2.74 M/UL (ref 4.2–5.4)
SODIUM SERPL-SCNC: 145 MMOL/L (ref 135–145)
SODIUM SERPL-SCNC: 145 MMOL/L (ref 135–145)
SODIUM SERPL-SCNC: 147 MMOL/L (ref 135–145)
WBC # BLD AUTO: 10.8 K/UL (ref 4.8–10.8)

## 2021-07-26 PROCEDURE — 700111 HCHG RX REV CODE 636 W/ 250 OVERRIDE (IP): Performed by: INTERNAL MEDICINE

## 2021-07-26 PROCEDURE — 700111 HCHG RX REV CODE 636 W/ 250 OVERRIDE (IP): Performed by: STUDENT IN AN ORGANIZED HEALTH CARE EDUCATION/TRAINING PROGRAM

## 2021-07-26 PROCEDURE — 80053 COMPREHEN METABOLIC PANEL: CPT

## 2021-07-26 PROCEDURE — 83735 ASSAY OF MAGNESIUM: CPT

## 2021-07-26 PROCEDURE — 700102 HCHG RX REV CODE 250 W/ 637 OVERRIDE(OP): Performed by: INTERNAL MEDICINE

## 2021-07-26 PROCEDURE — 770020 HCHG ROOM/CARE - TELE (206)

## 2021-07-26 PROCEDURE — 85027 COMPLETE CBC AUTOMATED: CPT

## 2021-07-26 PROCEDURE — A9270 NON-COVERED ITEM OR SERVICE: HCPCS | Performed by: INTERNAL MEDICINE

## 2021-07-26 PROCEDURE — A9270 NON-COVERED ITEM OR SERVICE: HCPCS | Performed by: STUDENT IN AN ORGANIZED HEALTH CARE EDUCATION/TRAINING PROGRAM

## 2021-07-26 PROCEDURE — A9270 NON-COVERED ITEM OR SERVICE: HCPCS | Performed by: FAMILY MEDICINE

## 2021-07-26 PROCEDURE — 700102 HCHG RX REV CODE 250 W/ 637 OVERRIDE(OP): Performed by: STUDENT IN AN ORGANIZED HEALTH CARE EDUCATION/TRAINING PROGRAM

## 2021-07-26 PROCEDURE — 80048 BASIC METABOLIC PNL TOTAL CA: CPT

## 2021-07-26 PROCEDURE — 97530 THERAPEUTIC ACTIVITIES: CPT

## 2021-07-26 PROCEDURE — 93005 ELECTROCARDIOGRAM TRACING: CPT | Performed by: STUDENT IN AN ORGANIZED HEALTH CARE EDUCATION/TRAINING PROGRAM

## 2021-07-26 PROCEDURE — 87493 C DIFF AMPLIFIED PROBE: CPT

## 2021-07-26 PROCEDURE — 93010 ELECTROCARDIOGRAM REPORT: CPT | Performed by: INTERNAL MEDICINE

## 2021-07-26 PROCEDURE — 700102 HCHG RX REV CODE 250 W/ 637 OVERRIDE(OP): Performed by: FAMILY MEDICINE

## 2021-07-26 PROCEDURE — 99232 SBSQ HOSP IP/OBS MODERATE 35: CPT | Mod: GC | Performed by: INTERNAL MEDICINE

## 2021-07-26 RX ORDER — POTASSIUM CHLORIDE 7.45 MG/ML
10 INJECTION INTRAVENOUS
Status: COMPLETED | OUTPATIENT
Start: 2021-07-26 | End: 2021-07-26

## 2021-07-26 RX ORDER — BUMETANIDE 1 MG/1
1 TABLET ORAL
Status: DISCONTINUED | OUTPATIENT
Start: 2021-07-27 | End: 2021-07-28

## 2021-07-26 RX ORDER — POTASSIUM CHLORIDE 20 MEQ/1
60 TABLET, EXTENDED RELEASE ORAL DAILY
Status: DISCONTINUED | OUTPATIENT
Start: 2021-07-26 | End: 2021-07-27

## 2021-07-26 RX ORDER — MAGNESIUM SULFATE HEPTAHYDRATE 40 MG/ML
2 INJECTION, SOLUTION INTRAVENOUS ONCE
Status: COMPLETED | OUTPATIENT
Start: 2021-07-26 | End: 2021-07-26

## 2021-07-26 RX ORDER — POTASSIUM CHLORIDE 20 MEQ/1
40 TABLET, EXTENDED RELEASE ORAL 3 TIMES DAILY
Status: DISCONTINUED | OUTPATIENT
Start: 2021-07-26 | End: 2021-07-28

## 2021-07-26 RX ADMIN — POTASSIUM CHLORIDE 40 MEQ: 1500 TABLET, EXTENDED RELEASE ORAL at 18:04

## 2021-07-26 RX ADMIN — FLUDROCORTISONE ACETATE 0.2 MG: 0.1 TABLET ORAL at 18:04

## 2021-07-26 RX ADMIN — LIOTHYRONINE SODIUM 25 MCG: 25 TABLET ORAL at 21:55

## 2021-07-26 RX ADMIN — DULOXETINE HYDROCHLORIDE 60 MG: 60 CAPSULE, DELAYED RELEASE ORAL at 21:55

## 2021-07-26 RX ADMIN — LEVOTHYROXINE SODIUM 75 MCG: 0.07 TABLET ORAL at 04:57

## 2021-07-26 RX ADMIN — COLESEVELAM HYDROCHLORIDE 625 MG: 625 TABLET, COATED ORAL at 21:54

## 2021-07-26 RX ADMIN — GABAPENTIN 400 MG: 400 CAPSULE ORAL at 18:04

## 2021-07-26 RX ADMIN — POTASSIUM CHLORIDE 10 MEQ: 7.46 INJECTION, SOLUTION INTRAVENOUS at 15:06

## 2021-07-26 RX ADMIN — HYDROCORTISONE 10 MG: 20 TABLET ORAL at 18:04

## 2021-07-26 RX ADMIN — Medication 2000 UNITS: at 05:05

## 2021-07-26 RX ADMIN — BUMETANIDE 2 MG: 1 TABLET ORAL at 05:06

## 2021-07-26 RX ADMIN — MAGNESIUM SULFATE 2 G: 2 INJECTION INTRAVENOUS at 07:57

## 2021-07-26 RX ADMIN — FENTANYL CITRATE 50 MCG: 50 INJECTION INTRAMUSCULAR; INTRAVENOUS at 12:56

## 2021-07-26 RX ADMIN — POTASSIUM CHLORIDE 10 MEQ: 7.46 INJECTION, SOLUTION INTRAVENOUS at 13:55

## 2021-07-26 RX ADMIN — OMEPRAZOLE 40 MG: 20 CAPSULE, DELAYED RELEASE ORAL at 04:57

## 2021-07-26 RX ADMIN — POTASSIUM CHLORIDE 10 MEQ: 7.46 INJECTION, SOLUTION INTRAVENOUS at 12:47

## 2021-07-26 RX ADMIN — ENOXAPARIN SODIUM 100 MG: 100 INJECTION SUBCUTANEOUS at 18:04

## 2021-07-26 RX ADMIN — FENTANYL CITRATE 50 MCG: 50 INJECTION INTRAMUSCULAR; INTRAVENOUS at 18:54

## 2021-07-26 RX ADMIN — HYDROCORTISONE 20 MG: 20 TABLET ORAL at 05:07

## 2021-07-26 RX ADMIN — COLESEVELAM HYDROCHLORIDE 625 MG: 625 TABLET, COATED ORAL at 07:58

## 2021-07-26 RX ADMIN — FLUDROCORTISONE ACETATE 0.2 MG: 0.1 TABLET ORAL at 05:30

## 2021-07-26 RX ADMIN — FENTANYL CITRATE 25 MCG: 50 INJECTION INTRAMUSCULAR; INTRAVENOUS at 22:45

## 2021-07-26 RX ADMIN — ENOXAPARIN SODIUM 100 MG: 100 INJECTION SUBCUTANEOUS at 04:55

## 2021-07-26 RX ADMIN — CARVEDILOL 6.25 MG: 6.25 TABLET, FILM COATED ORAL at 05:06

## 2021-07-26 RX ADMIN — GABAPENTIN 400 MG: 400 CAPSULE ORAL at 04:58

## 2021-07-26 RX ADMIN — CARVEDILOL 6.25 MG: 6.25 TABLET, FILM COATED ORAL at 18:04

## 2021-07-26 RX ADMIN — POTASSIUM CHLORIDE 10 MEQ: 7.46 INJECTION, SOLUTION INTRAVENOUS at 16:18

## 2021-07-26 RX ADMIN — FENTANYL CITRATE 50 MCG: 50 INJECTION INTRAMUSCULAR; INTRAVENOUS at 04:58

## 2021-07-26 RX ADMIN — POTASSIUM CHLORIDE 60 MEQ: 1500 TABLET, EXTENDED RELEASE ORAL at 07:56

## 2021-07-26 RX ADMIN — COLESEVELAM HYDROCHLORIDE 625 MG: 625 TABLET, COATED ORAL at 15:05

## 2021-07-26 RX ADMIN — POTASSIUM CHLORIDE 40 MEQ: 1500 TABLET, EXTENDED RELEASE ORAL at 12:46

## 2021-07-26 RX ADMIN — POTASSIUM CHLORIDE 40 MEQ: 1500 TABLET, EXTENDED RELEASE ORAL at 04:56

## 2021-07-26 RX ADMIN — AMITRIPTYLINE HYDROCHLORIDE 10 MG: 10 TABLET, FILM COATED ORAL at 04:56

## 2021-07-26 ASSESSMENT — PAIN DESCRIPTION - PAIN TYPE
TYPE: ACUTE PAIN

## 2021-07-26 ASSESSMENT — COGNITIVE AND FUNCTIONAL STATUS - GENERAL
STANDING UP FROM CHAIR USING ARMS: A LOT
MOVING TO AND FROM BED TO CHAIR: UNABLE
TURNING FROM BACK TO SIDE WHILE IN FLAT BAD: A LOT
MOBILITY SCORE: 8
SUGGESTED CMS G CODE MODIFIER MOBILITY: CM
MOVING FROM LYING ON BACK TO SITTING ON SIDE OF FLAT BED: UNABLE
WALKING IN HOSPITAL ROOM: TOTAL
CLIMB 3 TO 5 STEPS WITH RAILING: TOTAL

## 2021-07-26 ASSESSMENT — ENCOUNTER SYMPTOMS
ABDOMINAL PAIN: 1
CHILLS: 0
MYALGIAS: 0
DIARRHEA: 1
SHORTNESS OF BREATH: 0
VOMITING: 0
WEAKNESS: 1
NAUSEA: 0
PALPITATIONS: 0
FEVER: 0

## 2021-07-26 ASSESSMENT — PAIN SCALES - WONG BAKER
WONGBAKER_NUMERICALRESPONSE: HURTS JUST A LITTLE BIT
WONGBAKER_NUMERICALRESPONSE: HURTS A LITTLE MORE
WONGBAKER_NUMERICALRESPONSE: DOESN'T HURT AT ALL

## 2021-07-26 ASSESSMENT — GAIT ASSESSMENTS: GAIT LEVEL OF ASSIST: UNABLE TO PARTICIPATE

## 2021-07-26 NOTE — CARE PLAN
Problem: Knowledge Deficit - Standard  Goal: Patient and family/care givers will demonstrate understanding of plan of care, disease process/condition, diagnostic tests and medications  Outcome: Progressing     Problem: Fall Risk  Goal: Patient will remain free from falls  Outcome: Progressing     Problem: Skin Integrity  Goal: Skin integrity is maintained or improved  Outcome: Progressing     Problem: Pain - Standard  Goal: Alleviation of pain or a reduction in pain to the patient’s comfort goal  Outcome: Progressing   The patient is Stable - Low risk of patient condition declining or worsening    Shift Goals  Clinical Goals: manage BP, pain management  Patient Goals: up to chair    Progress made toward(s) clinical / shift goals:  progressing    Patient is not progressing towards the following goals:

## 2021-07-26 NOTE — THERAPY
Physical Therapy   Daily Treatment     Patient Name: Donna Isaac  Age:  56 y.o., Sex:  female  Medical Record #: [id number]  Today's Date: 7/26/2021     Precautions: Fall Risk    Assessment    Pt continues to improve in activity tolerance and ability to participate in out of bed tasks. Stand pivot transfer performed to and from toilet. Actively WB through LEs, did note buckling during pivot. Pt able to weight shift on commode for nicol care. Once back to EOB STS training performed with B knees blocked. Pt able to achieve full upright extension of hips and knees. Increased standing tolerance noted as well before needing to sit. Continue to recommend placement. Acute PT to follow.    Plan    Continue current treatment plan.    DC Equipment Recommendations: Unable to determine at this time  Discharge Recommendations: Recommend post-acute placement for additional physical therapy services prior to discharge home           Objective       07/26/21 1634   Cognition    Level of Consciousness Alert   Comments very pleasant and motivated   Balance   Sitting Balance (Static) Fair +   Sitting Balance (Dynamic) Fair   Standing Balance (Static) Trace +   Standing Balance (Dynamic) Trace   Weight Shift Sitting Fair   Weight Shift Standing Poor   Comments B knees blocked in standing   Gait Analysis   Gait Level Of Assist Unable to Participate   Bed Mobility    Supine to Sit Moderate Assist   Sit to Supine Moderate Assist   Scooting Supervised   Functional Mobility   Sit to Stand Maximal Assist   Bed, Chair, Wheelchair Transfer Maximal Assist   Toilet Transfers Maximal Assist   Short Term Goals    Short Term Goal # 1 Pt will be able to perform sit<>stand/transfers with FWW with Spv in 6tx to promote fnx progression towards I    Goal Outcome # 1 goal not met   Short Term Goal # 2 Pt will be able to ambulate 150ft with FWW with Spv in 6tx to promote fnx progression towards I    Goal Outcome # 2 Goal not met   Short Term Goal #  3 Pt will perform supine <> sit with SPV in 6 visits to get in/out of bed   Goal Outcome # 3 Goal not met

## 2021-07-26 NOTE — PROGRESS NOTES
Assumed care of patient, bedside report received from AILYN Goldman. Updated on POC, call light within reach and fall precautions in place. Bed locked and in lowest position. Patient instructed to call for assistance before getting out of bed. All questions answered, no other needs at this time.

## 2021-07-26 NOTE — CARE PLAN
The patient is Watcher - Medium risk of patient condition declining or worsening    Shift Goals  Clinical Goals: manage BP, pain management  Patient Goals: up to chair    Progress made toward(s) clinical / shift goals: discussed POC, answered all current questions. Patient medicated per MAR for pain, patient resting in bed rest of shift.     Patient is not progressing towards the following goals:na    Problem: Knowledge Deficit - Standard  Goal: Patient and family/care givers will demonstrate understanding of plan of care, disease process/condition, diagnostic tests and medications  Outcome: Progressing     Problem: Pain - Standard  Goal: Alleviation of pain or a reduction in pain to the patient’s comfort goal  Outcome: Progressing

## 2021-07-27 PROBLEM — R19.7 DIARRHEA: Status: ACTIVE | Noted: 2021-07-27

## 2021-07-27 LAB
ALBUMIN SERPL BCP-MCNC: 3.1 G/DL (ref 3.2–4.9)
ALBUMIN/GLOB SERPL: 1.4 G/DL
ALP SERPL-CCNC: 241 U/L (ref 30–99)
ALT SERPL-CCNC: 88 U/L (ref 2–50)
ANION GAP SERPL CALC-SCNC: 12 MMOL/L (ref 7–16)
AST SERPL-CCNC: 73 U/L (ref 12–45)
BILIRUB SERPL-MCNC: 4 MG/DL (ref 0.1–1.5)
BUN SERPL-MCNC: 8 MG/DL (ref 8–22)
CALCIUM SERPL-MCNC: 8.6 MG/DL (ref 8.5–10.5)
CHLORIDE SERPL-SCNC: 107 MMOL/L (ref 96–112)
CO2 SERPL-SCNC: 27 MMOL/L (ref 20–33)
CREAT SERPL-MCNC: 0.63 MG/DL (ref 0.5–1.4)
ERYTHROCYTE [DISTWIDTH] IN BLOOD BY AUTOMATED COUNT: 90.7 FL (ref 35.9–50)
GLOBULIN SER CALC-MCNC: 2.2 G/DL (ref 1.9–3.5)
GLUCOSE SERPL-MCNC: 98 MG/DL (ref 65–99)
HAPTOGLOB SERPL-MCNC: 88 MG/DL (ref 30–200)
HCT VFR BLD AUTO: 31.6 % (ref 37–47)
HGB BLD-MCNC: 9.4 G/DL (ref 12–16)
MAGNESIUM SERPL-MCNC: 1.7 MG/DL (ref 1.5–2.5)
MCH RBC QN AUTO: 31.2 PG (ref 27–33)
MCHC RBC AUTO-ENTMCNC: 29.7 G/DL (ref 33.6–35)
MCV RBC AUTO: 105 FL (ref 81.4–97.8)
PLATELET # BLD AUTO: 296 K/UL (ref 164–446)
PMV BLD AUTO: 11.2 FL (ref 9–12.9)
POTASSIUM SERPL-SCNC: 3.9 MMOL/L (ref 3.6–5.5)
PROT SERPL-MCNC: 5.3 G/DL (ref 6–8.2)
RBC # BLD AUTO: 3.01 M/UL (ref 4.2–5.4)
SODIUM SERPL-SCNC: 146 MMOL/L (ref 135–145)
WBC # BLD AUTO: 12.5 K/UL (ref 4.8–10.8)

## 2021-07-27 PROCEDURE — 700101 HCHG RX REV CODE 250: Performed by: STUDENT IN AN ORGANIZED HEALTH CARE EDUCATION/TRAINING PROGRAM

## 2021-07-27 PROCEDURE — 99223 1ST HOSP IP/OBS HIGH 75: CPT | Performed by: PHYSICAL MEDICINE & REHABILITATION

## 2021-07-27 PROCEDURE — 770020 HCHG ROOM/CARE - TELE (206)

## 2021-07-27 PROCEDURE — 700111 HCHG RX REV CODE 636 W/ 250 OVERRIDE (IP): Performed by: STUDENT IN AN ORGANIZED HEALTH CARE EDUCATION/TRAINING PROGRAM

## 2021-07-27 PROCEDURE — 700102 HCHG RX REV CODE 250 W/ 637 OVERRIDE(OP): Performed by: STUDENT IN AN ORGANIZED HEALTH CARE EDUCATION/TRAINING PROGRAM

## 2021-07-27 PROCEDURE — 700102 HCHG RX REV CODE 250 W/ 637 OVERRIDE(OP): Performed by: FAMILY MEDICINE

## 2021-07-27 PROCEDURE — 700111 HCHG RX REV CODE 636 W/ 250 OVERRIDE (IP): Performed by: FAMILY MEDICINE

## 2021-07-27 PROCEDURE — 83735 ASSAY OF MAGNESIUM: CPT

## 2021-07-27 PROCEDURE — 99232 SBSQ HOSP IP/OBS MODERATE 35: CPT | Mod: GC | Performed by: INTERNAL MEDICINE

## 2021-07-27 PROCEDURE — 700102 HCHG RX REV CODE 250 W/ 637 OVERRIDE(OP): Performed by: INTERNAL MEDICINE

## 2021-07-27 PROCEDURE — A9270 NON-COVERED ITEM OR SERVICE: HCPCS | Performed by: STUDENT IN AN ORGANIZED HEALTH CARE EDUCATION/TRAINING PROGRAM

## 2021-07-27 PROCEDURE — A9270 NON-COVERED ITEM OR SERVICE: HCPCS | Performed by: FAMILY MEDICINE

## 2021-07-27 PROCEDURE — A9270 NON-COVERED ITEM OR SERVICE: HCPCS | Performed by: INTERNAL MEDICINE

## 2021-07-27 PROCEDURE — 700111 HCHG RX REV CODE 636 W/ 250 OVERRIDE (IP): Performed by: INTERNAL MEDICINE

## 2021-07-27 PROCEDURE — 85027 COMPLETE CBC AUTOMATED: CPT

## 2021-07-27 PROCEDURE — 80053 COMPREHEN METABOLIC PANEL: CPT

## 2021-07-27 RX ORDER — LIDOCAINE 50 MG/G
1 PATCH TOPICAL EVERY 24 HOURS
Status: DISCONTINUED | OUTPATIENT
Start: 2021-07-27 | End: 2021-07-30 | Stop reason: HOSPADM

## 2021-07-27 RX ORDER — MAGNESIUM SULFATE HEPTAHYDRATE 40 MG/ML
4 INJECTION, SOLUTION INTRAVENOUS ONCE
Status: COMPLETED | OUTPATIENT
Start: 2021-07-27 | End: 2021-07-27

## 2021-07-27 RX ORDER — LACTOBACILLUS RHAMNOSUS GG 10B CELL
1 CAPSULE ORAL
Status: DISCONTINUED | OUTPATIENT
Start: 2021-07-27 | End: 2021-07-30 | Stop reason: HOSPADM

## 2021-07-27 RX ORDER — LACTOBACILLUS RHAMNOSUS GG 10B CELL
1 CAPSULE ORAL
Status: DISCONTINUED | OUTPATIENT
Start: 2021-07-28 | End: 2021-07-27

## 2021-07-27 RX ORDER — CALCIUM POLYCARBOPHIL 625 MG 625 MG/1
625 TABLET ORAL
Status: DISCONTINUED | OUTPATIENT
Start: 2021-07-27 | End: 2021-07-30 | Stop reason: HOSPADM

## 2021-07-27 RX ADMIN — COLESEVELAM HYDROCHLORIDE 625 MG: 625 TABLET, COATED ORAL at 08:00

## 2021-07-27 RX ADMIN — POTASSIUM CHLORIDE 60 MEQ: 1500 TABLET, EXTENDED RELEASE ORAL at 05:38

## 2021-07-27 RX ADMIN — LEVOTHYROXINE SODIUM 75 MCG: 0.07 TABLET ORAL at 05:39

## 2021-07-27 RX ADMIN — BUMETANIDE 1 MG: 1 TABLET ORAL at 05:38

## 2021-07-27 RX ADMIN — HYDROCORTISONE 10 MG: 20 TABLET ORAL at 18:22

## 2021-07-27 RX ADMIN — ONDANSETRON 4 MG: 4 TABLET, ORALLY DISINTEGRATING ORAL at 09:36

## 2021-07-27 RX ADMIN — ONDANSETRON 4 MG: 4 TABLET, ORALLY DISINTEGRATING ORAL at 13:13

## 2021-07-27 RX ADMIN — LIDOCAINE 1 PATCH: 50 PATCH TOPICAL at 15:18

## 2021-07-27 RX ADMIN — COLESEVELAM HYDROCHLORIDE 625 MG: 625 TABLET, COATED ORAL at 14:39

## 2021-07-27 RX ADMIN — CALCIUM POLYCARBOPHIL 625 MG: 625 TABLET, FILM COATED ORAL at 21:36

## 2021-07-27 RX ADMIN — LIOTHYRONINE SODIUM 25 MCG: 25 TABLET ORAL at 21:36

## 2021-07-27 RX ADMIN — GABAPENTIN 400 MG: 400 CAPSULE ORAL at 18:21

## 2021-07-27 RX ADMIN — CARVEDILOL 6.25 MG: 6.25 TABLET, FILM COATED ORAL at 18:21

## 2021-07-27 RX ADMIN — GABAPENTIN 400 MG: 400 CAPSULE ORAL at 05:38

## 2021-07-27 RX ADMIN — CALCITONIN SALMON 200 UNITS: 200 SPRAY, METERED NASAL at 22:55

## 2021-07-27 RX ADMIN — MAGNESIUM SULFATE IN WATER 4 G: 40 INJECTION, SOLUTION INTRAVENOUS at 15:17

## 2021-07-27 RX ADMIN — ENOXAPARIN SODIUM 100 MG: 100 INJECTION SUBCUTANEOUS at 05:38

## 2021-07-27 RX ADMIN — HYDROCORTISONE 20 MG: 20 TABLET ORAL at 05:37

## 2021-07-27 RX ADMIN — FLUDROCORTISONE ACETATE 0.2 MG: 0.1 TABLET ORAL at 18:21

## 2021-07-27 RX ADMIN — POTASSIUM CHLORIDE 40 MEQ: 1500 TABLET, EXTENDED RELEASE ORAL at 21:36

## 2021-07-27 RX ADMIN — AMITRIPTYLINE HYDROCHLORIDE 10 MG: 10 TABLET, FILM COATED ORAL at 05:39

## 2021-07-27 RX ADMIN — Medication 1 CAPSULE: at 09:36

## 2021-07-27 RX ADMIN — FLUDROCORTISONE ACETATE 0.2 MG: 0.1 TABLET ORAL at 05:39

## 2021-07-27 RX ADMIN — POTASSIUM CHLORIDE 40 MEQ: 1500 TABLET, EXTENDED RELEASE ORAL at 14:39

## 2021-07-27 RX ADMIN — FENTANYL CITRATE 25 MCG: 50 INJECTION INTRAMUSCULAR; INTRAVENOUS at 11:11

## 2021-07-27 RX ADMIN — OMEPRAZOLE 40 MG: 20 CAPSULE, DELAYED RELEASE ORAL at 05:38

## 2021-07-27 RX ADMIN — Medication 2000 UNITS: at 05:37

## 2021-07-27 RX ADMIN — COLESEVELAM HYDROCHLORIDE 625 MG: 625 TABLET, COATED ORAL at 21:36

## 2021-07-27 RX ADMIN — DULOXETINE HYDROCHLORIDE 60 MG: 60 CAPSULE, DELAYED RELEASE ORAL at 21:36

## 2021-07-27 RX ADMIN — POTASSIUM CHLORIDE 40 MEQ: 1500 TABLET, EXTENDED RELEASE ORAL at 05:37

## 2021-07-27 RX ADMIN — CARVEDILOL 6.25 MG: 6.25 TABLET, FILM COATED ORAL at 08:00

## 2021-07-27 RX ADMIN — ENOXAPARIN SODIUM 100 MG: 100 INJECTION SUBCUTANEOUS at 18:22

## 2021-07-27 ASSESSMENT — ENCOUNTER SYMPTOMS
LOSS OF CONSCIOUSNESS: 0
CHILLS: 0
COUGH: 0
PALPITATIONS: 0
DIARRHEA: 1
DIZZINESS: 0
WEAKNESS: 0
NAUSEA: 0
VOMITING: 0
FEVER: 0
ABDOMINAL PAIN: 1
SHORTNESS OF BREATH: 0

## 2021-07-27 ASSESSMENT — PAIN SCALES - WONG BAKER
WONGBAKER_NUMERICALRESPONSE: HURTS JUST A LITTLE BIT
WONGBAKER_NUMERICALRESPONSE: DOESN'T HURT AT ALL

## 2021-07-27 ASSESSMENT — PAIN DESCRIPTION - PAIN TYPE: TYPE: CHRONIC PAIN

## 2021-07-27 ASSESSMENT — FIBROSIS 4 INDEX: FIB4 SCORE: 1.5

## 2021-07-27 NOTE — PROGRESS NOTES
Assumed care of patient @1915, beside report received from Susi RN, Pt A&OX4 and is in 6/10 chest pain. She's had this pain for months. Gave patient 25mcg of fetanyl. Bed locked an lowered with call light in reach and questions answered in present time.

## 2021-07-27 NOTE — ASSESSMENT & PLAN NOTE
Pt w/ worsening diarrhea on 7/26, 7/27.  Improved today.  C. Diff negative.  Etiology is likely dietary, as she has a history of chronic diarrhea and she has been drinking a lot of juice lately which could contribute to osmotic diarrhea.  She is unable to take loperimide due to concerns for esophageal motility.  -Stool lactoferrin pending  -Fibercon 625 mg QAC  -Probiotics

## 2021-07-27 NOTE — DISCHARGE PLANNING
Documentation indicates interval improvement with therapy intervention and plantlet count. Please consider a PMR referral. Attending/CM  Messaged.
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
## 2021-07-27 NOTE — PROGRESS NOTES
Monitor Summary   Sinus Rhythm/Sinus Tachycardia   Rate   PVC  .19/.07/.36    12 hour chart check

## 2021-07-27 NOTE — CARE PLAN
The patient is Watcher - Medium risk of patient condition declining or worsening    Shift Goals  Clinical Goals: manage BP, pain management  Patient Goals: up to chair    Progress made toward(s) clinical / shift goals: Patient BP has been within normal range. Her pain has been well managed with current treatment. Patient got up to chair during day shift.     Patient is not progressing towards the following goals:N/A       Problem: Knowledge Deficit - Standard  Goal: Patient and family/care givers will demonstrate understanding of plan of care, disease process/condition, diagnostic tests and medications  Outcome: Progressing  Note: Patient up to date on POC. Patient educated on possible discharge. Educated on narcotic use.      Problem: Pain - Standard  Goal: Alleviation of pain or a reduction in pain to the patient’s comfort goal  Outcome: Progressing  Flowsheets (Taken 7/27/2021 0052)  Non Verbal Scale:   Calm   Sleeping  Ortiz-Bhandari Scale: 2   OB Pain Level: 1-Coping  OB Pain Intervention: Rest  Pain Rating Scale (NPRS): 1  Note: Patient has needed fetanyl once during my shift. It is Q2 but keeps the pain well managed.

## 2021-07-27 NOTE — DISCHARGE PLANNING
[facility] Transitional Care Coordination    Referral from:  Dr. Minh Jones  Insurance Provider on Facesheet:  Medicare/[place]  Potential Rehab Diagnosis:  Debility    Chart review indicates patient has on going medical management and therapy needs to possibly meet inpatient rehab facility criteria with the goal of returning to community.    D/C support: Spouse     Physiatry to consult per protocol.     Last Covid test:       Thank you for the referral.

## 2021-07-27 NOTE — PROGRESS NOTES
I certify that the patient requires continued medically necessary hospital services for the treatment of anasarca, hypokalemia, viral's disease and will remain in the hospital for 2 days.  Discharge plan is anticipated to be discharge to inpatient rehab.  Pending physiatry evaluation to discharge.

## 2021-07-27 NOTE — PROGRESS NOTES
Daily Progress Note:     Date of Service: 7/26/2021  Primary Team: [employer] [facility] Blue Team   Attending: Diane Momin M.D.   Senior Resident: Dr. Schmidt  Intern: Dr. Jones  Contact:  [phone]    Chief Complaint:   Abdominal pain    Subjective:  No acute events overnight.  Pt reports feeling well today, still having some mild abdominal pain.  No nausea, vomiting.  Had 6-7 episodes of diarrhea yesterday.  Edema continues to improve.  Was able to ambulate to chair today w/ help of .  No reported episodes of syncope.     Consultants/Specialty:  General Surgery - [facility]  GI - [facility]  ID- Lynnette ID    Review of Systems:    Review of Systems   Constitutional: Negative for chills and fever.   Respiratory: Negative for shortness of breath.    Cardiovascular: Negative for chest pain and palpitations.   Gastrointestinal: Positive for abdominal pain and diarrhea. Negative for nausea and vomiting.   Genitourinary: Negative for dysuria.   Musculoskeletal: Negative for myalgias.   Neurological: Positive for weakness.       Objective Data:   Physical Exam:   Vitals:   Temp:  [35.8 °C (96.5 °F)-37 °C (98.6 °F)] 35.8 °C (96.5 °F)  Pulse:  [] 99  Resp:  [16-18] 17  BP: (103-158)/() 136/90  SpO2:  [93 %-94 %] 94 %    Physical Exam  Constitutional:       General: She is not in acute distress.     Appearance: She is not toxic-appearing.   HENT:      Head: Normocephalic and atraumatic.      Mouth/Throat:      Mouth: Mucous membranes are moist.      Pharynx: Oropharynx is clear.   Eyes:      General: Scleral icterus (improving, mild now) present.      Extraocular Movements: Extraocular movements intact.      Comments: Small conjunctival hemorrhage on lateral aspect of right eye.   Cardiovascular:      Rate and Rhythm: Normal rate and regular rhythm.      Pulses: Normal pulses.      Heart sounds: Normal heart sounds. No murmur heard.   No friction rub. No gallop.    Pulmonary:      Effort: Pulmonary effort is normal. No  respiratory distress.      Breath sounds: Normal breath sounds. No wheezing, rhonchi or rales.   Abdominal:      General: Abdomen is flat. Bowel sounds are normal. There is no distension.      Palpations: Abdomen is soft.      Tenderness: There is abdominal tenderness (LUQ). There is no guarding or rebound.   Musculoskeletal:         General: Swelling present.      Right lower leg: Edema present.      Left lower leg: Edema present.      Comments: 1+ pitting edema in B/l LE, Continues to improve.  UE edema continues to improve.     Skin:     General: Skin is warm and dry.      Coloration: Skin is not jaundiced.   Neurological:      General: No focal deficit present.      Mental Status: She is alert.   Psychiatric:         Mood and Affect: Mood normal.         Behavior: Behavior normal.           Labs:   HEMATOLOGY/ ONCOLOGY/ID:            Recent Labs     07/24/21  0519 07/26/21  0530   WBC 11.8* 10.8   RBC 2.66* 2.74*   HEMOGLOBIN 8.3* 8.7*   HEMATOCRIT 27.3* 29.1*   .6* 106.2*   MCH 31.2 31.8   RDW 93.3* 93.0*   PLATELETCT 252 271   MPV 10.9 10.7   NEUTSPOLYS 72.80*  --    LYMPHOCYTES 14.70*  --    MONOCYTES 10.60  --    EOSINOPHILS 0.20  --    BASOPHILS 0.30  --      Lab Results   Component Value Date    GFFHJRVM13 3701 (H) 07/09/2021    ZDLIANIN12 528 09/18/2018    FOLATE 8.0 07/16/2021    FERRITIN 383.0 (H) 07/13/2021    FERRITIN 136.0 07/09/2021    FERRITIN 94.1 06/30/2021    IRON 106 07/13/2021    IRON 113 07/09/2021    IRON 136 06/30/2021    TOTIRONBC 123 (L) 07/13/2021    TOTIRONBC 130 (L) 07/09/2021    TOTIRONBC 153 (L) 06/30/2021       RENAL:        Estimated GFR/CRCL = Estimated Creatinine Clearance: 120.8 mL/min (by C-G formula based on SCr of 0.6 mg/dL).  Recent Labs     07/24/21  0519 07/24/21  0519 07/24/21  1604 07/24/21  1604 07/25/21  0300 07/26/21  0530 07/26/21  1120   SODIUM 141   < > 139   < > 146* 145 147*   POTASSIUM 3.2*   < > 3.4*   < > 3.7 2.7* 2.6*   CHLORIDE 100   < > 101   < > 108  106 103   CO2 32   < > 27   < > 29 29 27   GLUCOSE 90   < > 113*   < > 93 101* 186*   BUN 12   < > 11   < > 12 9 8   CREATININE 0.65   < > 0.83   < > 0.67 0.55 0.60   CALCIUM 8.1*   < > 8.2*   < > 8.2* 8.4* 7.9*   MAGNESIUM  --   --  1.7  --  2.1  --  2.1   ALBUMIN 2.6*  --   --   --   --  2.8*  --     < > = values in this interval not displayed.       GASTROINTESTINAL/ HEPATIC:          Recent Labs     07/24/21  0519 07/26/21  0530   ALTSGPT 90* 84*   ASTSGOT 86* 70*   ALKPHOSPHAT 257* 238*   TBILIRUBIN 5.2* 4.2*   ALBUMIN 2.6* 2.8*   GLOBULIN 1.9 2.1     Lab Results   Component Value Date    AMMONIA 31 07/19/2021       ENDOCRINE:              Recent Labs     07/25/21  0300 07/26/21  0530 07/26/21  1120   GLUCOSE 93 101* 186*     Lab Results   Component Value Date    HBA1C 6.0 (H) 06/29/2021    HBA1C 6.0 (H) 04/27/2021    HBA1C 6.0 (H) 06/19/2020    FREET4 1.27 06/29/2021    FREET4 1.40 06/16/2021    FREET4 1.16 06/15/2021    FREET3 2.46 05/12/2021    FREET3 3.10 12/09/2019    FREET3 4.28 (H) 10/18/2019    CORTISOL >63.4 (H) 06/22/2021    CORTISOL 5.1 06/17/2021    CORTISOL 2.7 05/25/2021       Imaging:   No new imaging overnight.      Problem Representation:  Donna Isaac  is a 56 y.o. y/o woman w/ PMHx DVT on eliquis, viral's dz w/ recurrent syncope, diverticulosis, HTN, hypothyroid, MI who presented on 7/9/2021 w/ hematochezia, new thrombocytopenia with decreased hemoglobin likely 2/2 DILI.  Anasarca improving, tolerating current diuresis regimen well w/ stable potassium.  No new syncopal events in previous 48 hours, since increase in fludrocortisone, blood pressures tolerating increase well.      * Anasarca  Assessment & Plan  Pt anasarcic with pitting edema throughout both LE and in the UE as well.  This is likely multifactorial w/ components of heart failure leading to gut edema and acute exacerbation, adrenal insufficiency leading to electrolyte imbalances and liver disease all playing roles into it's  development. Anasarca improving, will decrease diuresis to maintenance dose now and monitor K.  -Bumex 1 mg QD  -40 mEq Potassium PO TID while diuresing  -replete K and Mg as necessary  -Encourage ambulating from bed to chair as tolerated.  -Continue PT/OT treatment  -Initially noticed RLE increased edema over LLE, can consider DVT-US of right leg pending further improvement of edema.      Thrombocytopenia (HCC)- (present on admission)  Assessment & Plan  Resolved. Etiology still unknown at this time.  Extensive lab work-up has been used to rule-out many common causes.  Current suspected etiology is drug induced liver injury leading to thrombocytopenia, as she is on multiple medications and frequently in the hospital requiring the use of abx, recently requiring vanc and meropenem on 7/11.  In some case reports, DOACs have also contributed to thrombocytopenia, as well as has lasix.  ITP is unlikely, as she has had a good response to platelets during this admission.  DIC unlikely as she has never had a markedly decrease in fibrinogen or schistocytes seen on smear.  MRCP on 7/16 shows no intra or extrahepatic dilatation that would be concerning for cholestasis or obstruction.  -Consider Liver Bx pending recovery of platelets, per GI don't need bx as long as LFTs continue to resolve  -AM CBC  -Transfuse plt if <20k      Adrenal insufficiency (Dubois's disease) (HCC)- (present on admission)  Assessment & Plan  Pt w/ hx of viral's disease leading to recurrent syncopal events that appear to be orthostatic.  She notes that she has been having episodes of syncope more frequently lately, and continues to have them while in bed.  We have continued her home meds here.  This will likely be an ongoing issue and will need further med titration outpatient to reduce the amount of syncopal events.  The continuation of these events while lying in bed is potentially due to receiving stress dose steroids earlier in the admission w/o  enough of a taper for her chronic adrenal insufficiency.  Will continue to monitor for improvement.  Difficult balancing act between hypertension and maintaining high enough blood pressure to prevent orthostatic hypotension.  There is some concern that these episodes of passing out could also be seizures, as pt has a hx of seizure.  She hasn't taken any antiepileptics since 2009 and hasn't noticed any issues with seizures, she hasn't been followed by neurology since then.  She sometimes wakes up from passing out and is confused, which is concerning for post-ictal state.  While working w/ PT on 7/21, she had an episode while trying to stand, orthostatic blood pressures at that time were normal for laying and sitting, but w/ an episode of syncope this could be considered positive orthostatic hypotension.  Improving blood pressure with increase in fludrocortisone, no issues w/ syncope or orthostasis.  -Carvedilol decreased to 6.25 mg PO BID  -Increase Fludrocortisone to 0.2 PO BID  -Hydrocortisone 20 mg in AM, 10 mg in PM daily    Hypokalemia  Assessment & Plan  Pt w/ worsening hypokalemia during diuresis.  Ongoing issues w/ hypokalemia while diuresing.  -Repeat CMP AM  -Repeat BMP 1900  -Replete K and Mg as necessary    Elevated liver enzymes  Assessment & Plan  Continues to improve.  Initially there was a cholestatic liver pattern, w/ mild elevations of AST and ALT, which have now peaked and are trending down.  Alk phos has peaked as well and is trending down.  This elevation in liver enzymes is likely 2/2 drug induced liver injury.  Although she is s/p cholecystectomy, there could have been some biliary sludge contributing to a cholestatic pattern.  MRCP on 6/17 shows no concern for cholestasis at this time.  Hepatitis A, B, C have been non-reactive, HIV negative, AMA, KINZA negative, celiac screen negative, HSV IgG elevated, EBV IgG elevated, CMV IgG negative, alpha 1 antitrypsin negative.  -Per GI, continue to  follow LFTs - if daily improvement is not observed, please call GI back and will pursue liver biopsy at that time      Acute kidney injury (HCC)  Assessment & Plan  Stable.  New MARY LOU on 7/19.  Cr on 7/17 was 0.17, elevated up to 0.5 on 7/19.  This meets KDIGO criteria for elevation of 0.3 w/in 48 hours or 1.5x elevation over 7 days.  This is likely a prerenal process in the setting of decreased intravascular volume during diuresis.  Pt also w/ metabolic alkalosis, which is likely due to hypokalemia.  -AM CMP, continue to monitor    Lower GI bleed- (present on admission)  Assessment & Plan  3 episodes of hematochezia at rehab center, has resolved with no hematochezia or melena    History of pulmonary embolus (PE)- (present on admission)  Assessment & Plan  CTA of chest done on 4/27/2021 showed small left lower lobe subsegmental PE.  However repeat CTA on 5/10/2021 showed resolution.  Patient supposed to be on Eliquis for 3-month. Per vascular medicine she was not recommended to continue. Discontinued    Hypothyroidism- (present on admission)  Assessment & Plan  Hx of hypothyroidism.  Continue home Levothyroxine 75 mcg PO QD      Code Status: Full  DVT ppx: CI in the setting of low platelets  Diet: Cardiac diet  GI: Bowel regimen  T/L/D: CVC left IJV, reyna catheter      Minh Jones DO  PGY-1, [facility]    Assessment and plan discussed with senior resident and attending physician.

## 2021-07-28 ENCOUNTER — HOSPITAL ENCOUNTER (INPATIENT)
Facility: REHABILITATION | Age: 56
End: 2021-07-28
Attending: PHYSICAL MEDICINE & REHABILITATION | Admitting: PHYSICAL MEDICINE & REHABILITATION
Payer: MEDICARE

## 2021-07-28 LAB
ALBUMIN SERPL BCP-MCNC: 2.7 G/DL (ref 3.2–4.9)
ALBUMIN/GLOB SERPL: 1.2 G/DL
ALP SERPL-CCNC: 200 U/L (ref 30–99)
ALT SERPL-CCNC: 75 U/L (ref 2–50)
ANION GAP SERPL CALC-SCNC: 7 MMOL/L (ref 7–16)
AST SERPL-CCNC: 55 U/L (ref 12–45)
BILIRUB SERPL-MCNC: 3.1 MG/DL (ref 0.1–1.5)
BUN SERPL-MCNC: 11 MG/DL (ref 8–22)
CALCIUM SERPL-MCNC: 8.6 MG/DL (ref 8.5–10.5)
CHLORIDE SERPL-SCNC: 105 MMOL/L (ref 96–112)
CO2 SERPL-SCNC: 27 MMOL/L (ref 20–33)
CREAT SERPL-MCNC: 0.61 MG/DL (ref 0.5–1.4)
EKG IMPRESSION: NORMAL
ERYTHROCYTE [DISTWIDTH] IN BLOOD BY AUTOMATED COUNT: 87.6 FL (ref 35.9–50)
GLOBULIN SER CALC-MCNC: 2.3 G/DL (ref 1.9–3.5)
GLUCOSE SERPL-MCNC: 100 MG/DL (ref 65–99)
HCT VFR BLD AUTO: 28.1 % (ref 37–47)
HGB BLD-MCNC: 8.3 G/DL (ref 12–16)
MCH RBC QN AUTO: 31.2 PG (ref 27–33)
MCHC RBC AUTO-ENTMCNC: 29.5 G/DL (ref 33.6–35)
MCV RBC AUTO: 105.6 FL (ref 81.4–97.8)
PLATELET # BLD AUTO: 255 K/UL (ref 164–446)
PMV BLD AUTO: 10.9 FL (ref 9–12.9)
POTASSIUM SERPL-SCNC: 4.6 MMOL/L (ref 3.6–5.5)
PREALB SERPL-MCNC: 12 MG/DL (ref 18–38)
PROT SERPL-MCNC: 5 G/DL (ref 6–8.2)
RBC # BLD AUTO: 2.66 M/UL (ref 4.2–5.4)
SODIUM SERPL-SCNC: 139 MMOL/L (ref 135–145)
WBC # BLD AUTO: 9.9 K/UL (ref 4.8–10.8)

## 2021-07-28 PROCEDURE — 97530 THERAPEUTIC ACTIVITIES: CPT

## 2021-07-28 PROCEDURE — 700101 HCHG RX REV CODE 250: Performed by: STUDENT IN AN ORGANIZED HEALTH CARE EDUCATION/TRAINING PROGRAM

## 2021-07-28 PROCEDURE — 700111 HCHG RX REV CODE 636 W/ 250 OVERRIDE (IP): Performed by: STUDENT IN AN ORGANIZED HEALTH CARE EDUCATION/TRAINING PROGRAM

## 2021-07-28 PROCEDURE — 84134 ASSAY OF PREALBUMIN: CPT

## 2021-07-28 PROCEDURE — 80053 COMPREHEN METABOLIC PANEL: CPT

## 2021-07-28 PROCEDURE — A9270 NON-COVERED ITEM OR SERVICE: HCPCS | Performed by: STUDENT IN AN ORGANIZED HEALTH CARE EDUCATION/TRAINING PROGRAM

## 2021-07-28 PROCEDURE — 97530 THERAPEUTIC ACTIVITIES: CPT | Mod: CQ

## 2021-07-28 PROCEDURE — 700102 HCHG RX REV CODE 250 W/ 637 OVERRIDE(OP): Performed by: INTERNAL MEDICINE

## 2021-07-28 PROCEDURE — 85027 COMPLETE CBC AUTOMATED: CPT

## 2021-07-28 PROCEDURE — 700102 HCHG RX REV CODE 250 W/ 637 OVERRIDE(OP): Performed by: STUDENT IN AN ORGANIZED HEALTH CARE EDUCATION/TRAINING PROGRAM

## 2021-07-28 PROCEDURE — A9270 NON-COVERED ITEM OR SERVICE: HCPCS | Performed by: INTERNAL MEDICINE

## 2021-07-28 PROCEDURE — 700101 HCHG RX REV CODE 250: Performed by: INTERNAL MEDICINE

## 2021-07-28 PROCEDURE — A9270 NON-COVERED ITEM OR SERVICE: HCPCS | Performed by: FAMILY MEDICINE

## 2021-07-28 PROCEDURE — 99232 SBSQ HOSP IP/OBS MODERATE 35: CPT | Mod: GC | Performed by: INTERNAL MEDICINE

## 2021-07-28 PROCEDURE — 770006 HCHG ROOM/CARE - MED/SURG/GYN SEMI*

## 2021-07-28 PROCEDURE — 700102 HCHG RX REV CODE 250 W/ 637 OVERRIDE(OP): Performed by: FAMILY MEDICINE

## 2021-07-28 PROCEDURE — 97535 SELF CARE MNGMENT TRAINING: CPT

## 2021-07-28 RX ORDER — ACETAMINOPHEN 500 MG
500 TABLET ORAL ONCE
Status: COMPLETED | OUTPATIENT
Start: 2021-07-28 | End: 2021-07-28

## 2021-07-28 RX ORDER — BUMETANIDE 0.5 MG/1
0.5 TABLET ORAL
Status: DISCONTINUED | OUTPATIENT
Start: 2021-07-29 | End: 2021-07-30 | Stop reason: HOSPADM

## 2021-07-28 RX ORDER — DIPHENHYDRAMINE HCL 25 MG
25 TABLET ORAL ONCE
Status: COMPLETED | OUTPATIENT
Start: 2021-07-28 | End: 2021-07-28

## 2021-07-28 RX ORDER — LIDOCAINE 50 MG/G
1 PATCH TOPICAL ONCE
Status: COMPLETED | OUTPATIENT
Start: 2021-07-28 | End: 2021-07-28

## 2021-07-28 RX ORDER — FLUDROCORTISONE ACETATE 0.1 MG/1
0.1 TABLET ORAL 2 TIMES DAILY
Status: DISCONTINUED | OUTPATIENT
Start: 2021-07-28 | End: 2021-07-30 | Stop reason: HOSPADM

## 2021-07-28 RX ORDER — POTASSIUM CHLORIDE 20 MEQ/1
40 TABLET, EXTENDED RELEASE ORAL 2 TIMES DAILY
Status: DISCONTINUED | OUTPATIENT
Start: 2021-07-28 | End: 2021-07-30 | Stop reason: HOSPADM

## 2021-07-28 RX ADMIN — COLESEVELAM HYDROCHLORIDE 625 MG: 625 TABLET, COATED ORAL at 08:52

## 2021-07-28 RX ADMIN — Medication 2000 UNITS: at 04:48

## 2021-07-28 RX ADMIN — CALCIUM POLYCARBOPHIL 625 MG: 625 TABLET, FILM COATED ORAL at 12:34

## 2021-07-28 RX ADMIN — CARVEDILOL 6.25 MG: 6.25 TABLET, FILM COATED ORAL at 18:23

## 2021-07-28 RX ADMIN — LEVOTHYROXINE SODIUM 75 MCG: 0.07 TABLET ORAL at 04:48

## 2021-07-28 RX ADMIN — ACETAMINOPHEN 500 MG: 500 TABLET ORAL at 09:37

## 2021-07-28 RX ADMIN — AMITRIPTYLINE HYDROCHLORIDE 10 MG: 10 TABLET, FILM COATED ORAL at 04:44

## 2021-07-28 RX ADMIN — SUMATRIPTAN SUCCINATE 50 MG: 50 TABLET ORAL at 18:40

## 2021-07-28 RX ADMIN — CALCIUM POLYCARBOPHIL 625 MG: 625 TABLET, FILM COATED ORAL at 18:40

## 2021-07-28 RX ADMIN — ENOXAPARIN SODIUM 100 MG: 100 INJECTION SUBCUTANEOUS at 18:24

## 2021-07-28 RX ADMIN — DULOXETINE HYDROCHLORIDE 60 MG: 60 CAPSULE, DELAYED RELEASE ORAL at 20:38

## 2021-07-28 RX ADMIN — ENOXAPARIN SODIUM 100 MG: 100 INJECTION SUBCUTANEOUS at 04:44

## 2021-07-28 RX ADMIN — CARVEDILOL 6.25 MG: 6.25 TABLET, FILM COATED ORAL at 05:00

## 2021-07-28 RX ADMIN — LIDOCAINE 1 PATCH: 50 PATCH TOPICAL at 15:26

## 2021-07-28 RX ADMIN — FLUDROCORTISONE ACETATE 0.2 MG: 0.1 TABLET ORAL at 04:47

## 2021-07-28 RX ADMIN — HYDROCORTISONE 20 MG: 20 TABLET ORAL at 04:47

## 2021-07-28 RX ADMIN — COLESEVELAM HYDROCHLORIDE 625 MG: 625 TABLET, COATED ORAL at 15:26

## 2021-07-28 RX ADMIN — LIDOCAINE 1 PATCH: 50 PATCH TOPICAL at 09:37

## 2021-07-28 RX ADMIN — POTASSIUM CHLORIDE 40 MEQ: 1500 TABLET, EXTENDED RELEASE ORAL at 18:24

## 2021-07-28 RX ADMIN — Medication 1 CAPSULE: at 08:49

## 2021-07-28 RX ADMIN — BUMETANIDE 1 MG: 1 TABLET ORAL at 04:53

## 2021-07-28 RX ADMIN — HYDRALAZINE HYDROCHLORIDE 20 MG: 20 INJECTION INTRAMUSCULAR; INTRAVENOUS at 04:57

## 2021-07-28 RX ADMIN — HYDROCORTISONE 10 MG: 20 TABLET ORAL at 18:24

## 2021-07-28 RX ADMIN — CARVEDILOL 6.25 MG: 6.25 TABLET, FILM COATED ORAL at 08:49

## 2021-07-28 RX ADMIN — OMEPRAZOLE 40 MG: 20 CAPSULE, DELAYED RELEASE ORAL at 04:47

## 2021-07-28 RX ADMIN — CALCIUM POLYCARBOPHIL 625 MG: 625 TABLET, FILM COATED ORAL at 08:49

## 2021-07-28 RX ADMIN — GABAPENTIN 400 MG: 400 CAPSULE ORAL at 04:47

## 2021-07-28 RX ADMIN — DIPHENHYDRAMINE HYDROCHLORIDE 25 MG: 25 TABLET ORAL at 22:29

## 2021-07-28 RX ADMIN — LIOTHYRONINE SODIUM 25 MCG: 25 TABLET ORAL at 20:38

## 2021-07-28 RX ADMIN — POTASSIUM CHLORIDE 40 MEQ: 1500 TABLET, EXTENDED RELEASE ORAL at 04:47

## 2021-07-28 RX ADMIN — GABAPENTIN 400 MG: 400 CAPSULE ORAL at 18:24

## 2021-07-28 RX ADMIN — FLUDROCORTISONE ACETATE 0.1 MG: 0.1 TABLET ORAL at 18:24

## 2021-07-28 RX ADMIN — COLESEVELAM HYDROCHLORIDE 625 MG: 625 TABLET, COATED ORAL at 20:38

## 2021-07-28 RX ADMIN — CALCITONIN SALMON 200 UNITS: 200 SPRAY, METERED NASAL at 20:38

## 2021-07-28 ASSESSMENT — COGNITIVE AND FUNCTIONAL STATUS - GENERAL
EATING MEALS: A LITTLE
CLIMB 3 TO 5 STEPS WITH RAILING: TOTAL
STANDING UP FROM CHAIR USING ARMS: A LOT
DRESSING REGULAR UPPER BODY CLOTHING: A LITTLE
PERSONAL GROOMING: A LITTLE
DAILY ACTIVITIY SCORE: 16
MOVING TO AND FROM BED TO CHAIR: UNABLE
SUGGESTED CMS G CODE MODIFIER MOBILITY: CM
TURNING FROM BACK TO SIDE WHILE IN FLAT BAD: A LOT
TOILETING: A LITTLE
WALKING IN HOSPITAL ROOM: TOTAL
DRESSING REGULAR LOWER BODY CLOTHING: A LOT
MOBILITY SCORE: 8
SUGGESTED CMS G CODE MODIFIER DAILY ACTIVITY: CK
HELP NEEDED FOR BATHING: A LOT
MOVING FROM LYING ON BACK TO SITTING ON SIDE OF FLAT BED: UNABLE

## 2021-07-28 ASSESSMENT — ENCOUNTER SYMPTOMS
CHILLS: 0
ABDOMINAL PAIN: 0
VOMITING: 0
COUGH: 0
LOSS OF CONSCIOUSNESS: 0
DIARRHEA: 0
DIZZINESS: 0
FEVER: 0
BACK PAIN: 1
FLANK PAIN: 1
NAUSEA: 0
SHORTNESS OF BREATH: 0
PALPITATIONS: 0

## 2021-07-28 ASSESSMENT — PAIN DESCRIPTION - PAIN TYPE
TYPE: CHRONIC PAIN
TYPE: CHRONIC PAIN

## 2021-07-28 ASSESSMENT — FIBROSIS 4 INDEX: FIB4 SCORE: 1.42

## 2021-07-28 ASSESSMENT — GAIT ASSESSMENTS: GAIT LEVEL OF ASSIST: UNABLE TO PARTICIPATE

## 2021-07-28 NOTE — CARE PLAN
The patient is Watcher - Medium risk of patient condition declining or worsening    Shift Goals  Clinical Goals: manage BP, pain management  Patient Goals: up to chair    Progress made toward(s) clinical / shift goals:  Patients BP has been stable. She has remained free from pain.     Patient is not progressing towards the following goals: N/A      Problem: Knowledge Deficit - Standard  Goal: Patient and family/care givers will demonstrate understanding of plan of care, disease process/condition, diagnostic tests and medications  Outcome: Progressing  Note: Patient up to date on POC.      Problem: Pain - Standard  Goal: Alleviation of pain or a reduction in pain to the patient’s comfort goal  7/28/2021 0104 by Omid Rose RJelaniN.  Outcome: Progressing  Flowsheets (Taken 7/27/2021 1915)  Non Verbal Scale: Calm  Ortiz-Bhandari Scale: 0   OB Pain Level: 1-Coping  OB Pain Intervention: Rest  Pain Rating Scale (NPRS): 0  Note: Patient has remained free from pain during my shift.   7/28/2021 0055 by Omid Rose R.N.  Outcome: Progressing  Flowsheets (Taken 7/27/2021 1915)  Non Verbal Scale: Calm  Ortiz-Bhandari Scale: 0   OB Pain Level: 1-Coping  OB Pain Intervention: Rest  Pain Rating Scale (NPRS): 0  Note: Patient has remained free

## 2021-07-28 NOTE — THERAPY
"Occupational Therapy  Daily Treatment     Patient Name: Donna Isaac  Age:  56 y.o., Sex:  female  Medical Record #: [id number]  Today's Date: 7/28/2021     Precautions  Precautions: Fall Risk  Comments: hx of syncope     Assessment    Pt seen for follow up OT tx session, pt encouraged by mobility and progression in ADLs although disappointed with discharge disposition as was eager to leave today but unable to transfer today. Pt able to complete multiple STS transfers with maxA initially with a FWW but better progress with HHA to prevent knee buckling as well as transfer to chair to reduce time spent in bed. Will continue to see for skilled therapy as well as recommend post-acute placement.    Plan    Continue current treatment plan.    DC Equipment Recommendations: Unable to determine at this time  Discharge Recommendations: Recommend post-acute placement for additional occupational therapy services prior to discharge home    Subjective    \"I was up in the chair for 3 hours yesterday\"     Objective       07/28/21 1510   Precautions   Precautions Fall Risk   Pain 0 - 10 Group   Therapist Pain Assessment Post Activity Pain Same as Prior to Activity;Nurse Notified   Cognition    Cognition / Consciousness WDL   Level of Consciousness Alert   Active ROM Upper Body   Active ROM Upper Body  WDL   Dominant Hand Right   Strength Upper Body   Upper Body Strength  X   Gross Strength Generalized Weakness, Equal Bilaterally.    Sensation Upper Body   Upper Extremity Sensation  WDL   Balance   Sitting Balance (Static) Fair +   Sitting Balance (Dynamic) Fair   Standing Balance (Static) Poor +   Standing Balance (Dynamic) Poor -   Weight Shift Sitting Fair   Weight Shift Standing Poor   Skilled Intervention Verbal Cuing;Postural Facilitation   Comments w/ FWW and HHA   Bed Mobility    Supine to Sit Supervised   Sit to Supine Supervised   Scooting Supervised   Rolling Supervised   Skilled Intervention Verbal Cuing "   Activities of Daily Living   Grooming Supervision;Seated   Upper Body Dressing Supervision   Lower Body Dressing Maximal Assist   Skilled Intervention Verbal Cuing   How much help from another person does the patient currently need...   Putting on and taking off regular lower body clothing? 2   Bathing (including washing, rinsing, and drying)? 2   Toileting, which includes using a toilet, bedpan, or urinal? 3   Putting on and taking off regular upper body clothing? 3   Taking care of personal grooming such as brushing teeth? 3   Eating meals? 3   6 Clicks Daily Activity Score 16   Functional Mobility   Sit to Stand Maximal Assist   Bed, Chair, Wheelchair Transfer Maximal Assist   Toilet Transfers Refused   Transfer Method Stand Step   Mobility bed mobility, EOB, STS, transfer to chair, multiple STS's transfers   Skilled Intervention Verbal Cuing   Comments w/ FWW   Activity Tolerance   Sitting in Chair left seated in chair   Sitting Edge of Bed 10   Standing 10 in total   Patient / Family Goals   Patient / Family Goal #1 To go home   Goal #1 Outcome Progressing as expected   Short Term Goals   Short Term Goal # 1 Pt will complete ADL transfers with supervision   Goal Outcome # 1 Progressing as expected   Short Term Goal # 2 Pt will complete LB dressing with supervision AE PRN   Goal Outcome # 2 Progressing as expected   Short Term Goal # 3 Pt will complete toileting with supervision   Goal Outcome # 3 Progressing as expected   Education Group   Education Provided Role of Occupational Therapist   Role of Occupational Therapist Patient Response Patient;Acceptance;Explanation   Additional Comments RN updated   Interdisciplinary Plan of Care Collaboration   IDT Collaboration with  Nursing   Patient Position at End of Therapy Seated;Chair Alarm On;Call Light within Reach;Tray Table within Reach;Phone within Reach   Collaboration Comments RN updated

## 2021-07-28 NOTE — DISCHARGE PLANNING
Received Choice form at 1145  Agency/Facility Name: [facility](1), [facility](2), [facility]  Referral sent per Choice form @ 1145    RN CM informed

## 2021-07-28 NOTE — PROGRESS NOTES
Assumed care of patient @1915, beside report received from Charlie RN, Pt A&OX4 and denies any pain. Bed locked an lowered with call light in reach and questions answered in present time.

## 2021-07-28 NOTE — PROGRESS NOTES
Patients BP is 189/110. Notified MD Corazon Reynolds. Gave patient 20mg PRN hydralazine. MD Reynolds also wanted me to give the carvedilol 6.25mg. Administered the medication. Will recheck BP in 30 minutes.

## 2021-07-28 NOTE — DISCHARGE PLANNING
[facility] Transitional [facility] Coordination    Physiatry consult complete.  Dr. Beasley recommending skilled nursing for post acute transitional care.  BRITTANY Fung aware.

## 2021-07-28 NOTE — THERAPY
Physical Therapy   Daily Treatment     Patient Name: Donna Isaac  Age:  56 y.o., Sex:  female  Medical Record #: [id number]  Today's Date: 7/28/2021     Precautions: Fall Risk    Assessment    Pt was pleasant and motivated, she progress with STS from higher surface and bed mobility today. She was able to side step to chair with modA for fww and safety, no knee buckling noted. Practiced STS from lower chair surface which pt experienced more difficulty performing. She attempted 2x with fww and third time with HHA, Half way through transition pt would decreased effort and required more assist from therapist. Raised height of chair for nursing staff to be able to assist with transfers at this time.     Plan    Continue current treatment plan.    DC Equipment Recommendations: Unable to determine at this time  Discharge Recommendations: Recommend post-acute placement for additional physical therapy services prior to discharge home         07/28/21 1515   Other Treatments   Other Treatments Provided STS from low chair surface 3 attempts educating on different techniques    Balance   Sitting Balance (Static) Fair +   Sitting Balance (Dynamic) Fair   Standing Balance (Static) Poor +   Standing Balance (Dynamic) Poor -   Weight Shift Sitting Fair   Weight Shift Standing Poor   Skilled Intervention Verbal Cuing   Comments with fww and HHA    Gait Analysis   Gait Level Of Assist Unable to Participate   Bed Mobility    Supine to Sit Supervised   Sit to Supine   (left up in chair )   Scooting Supervised   Rolling Supervised   Skilled Intervention Verbal Cuing   Comments spv with bed features to reach EOB    Functional Mobility   Sit to Stand Maximal Assist   Bed, Chair, Wheelchair Transfer Moderate Assist   Skilled Intervention Verbal Cuing   Comments with fww maxA for STS from low chair surface. Pt having difficulty with sequencing and in middle of STS would decrease her effort.    Short Term Goals    Short Term Goal # 1  Pt will be able to perform sit<>stand/transfers with FWW with Spv in 6tx to promote fnx progression towards I    Goal Outcome # 1 goal not met   Short Term Goal # 2 Pt will be able to ambulate 150ft with FWW with Spv in 6tx to promote fnx progression towards I    Goal Outcome # 2 Goal not met   Short Term Goal # 3 Pt will perform supine <> sit with SPV in 6 visits to get in/out of bed   Goal Outcome # 3 Goal not met

## 2021-07-28 NOTE — CARE PLAN
Problem: Knowledge Deficit - Standard  Goal: Patient and family/care givers will demonstrate understanding of plan of care, disease process/condition, diagnostic tests and medications  Outcome: Progressing     Problem: Fall Risk  Goal: Patient will remain free from falls  Outcome: Progressing     Problem: Skin Integrity  Goal: Skin integrity is maintained or improved  Outcome: Progressing     Problem: Pain - Standard  Goal: Alleviation of pain or a reduction in pain to the patient’s comfort goal  Outcome: Progressing   The patient is Stable - Low risk of patient condition declining or worsening    Shift Goals  Clinical Goals: manage BP, pain management  Patient Goals: up to chair    Progress made toward(s) clinical / shift goals:  progressning    Patient is not progressing towards the following goals:

## 2021-07-28 NOTE — PROGRESS NOTES
Daily Progress Note:     Date of Service: 7/28/2021  Primary Team: [employer] [facility] Blue [facility]   Attending: Diane Momin M.D.   Senior Resident: Dr. Schmidt  Intern: Dr. Jones  Contact:  [phone]    Chief Complaint:   Abdominal pain    Subjective:  No acute events overnight.  Pt reports having some sharp flank pain overnight.  Overall she continues to feel better.  Her diarrhea has stopped for the time being.  She was able to tolerate sitting in the chair about 2 hours today.    Consultants/Specialty:  General Surgery - [facility]  GI - [facility]  ID- Lynnette ID    Review of Systems:    Review of Systems   Constitutional: Negative for chills and fever.   Respiratory: Negative for cough and shortness of breath.    Cardiovascular: Negative for chest pain and palpitations.   Gastrointestinal: Negative for abdominal pain, diarrhea, nausea and vomiting.   Genitourinary: Positive for flank pain. Negative for dysuria.   Musculoskeletal: Positive for back pain.   Neurological: Negative for dizziness and loss of consciousness.       Objective Data:   Physical Exam:   Vitals:   Temp:  [36.1 °C (96.9 °F)-36.9 °C (98.4 °F)] 36.9 °C (98.4 °F)  Pulse:  [] 101  Resp:  [16-18] 16  BP: (108-189)/() 110/74  SpO2:  [92 %-95 %] 94 %    Physical Exam  Constitutional:       General: She is not in acute distress.     Appearance: She is not toxic-appearing.   HENT:      Head: Normocephalic and atraumatic.      Mouth/Throat:      Mouth: Mucous membranes are moist.      Pharynx: Oropharynx is clear.   Eyes:      General: No scleral icterus.     Extraocular Movements: Extraocular movements intact.      Comments: Small conjunctival hemorrhage on lateral aspect of right eye, improving   Cardiovascular:      Rate and Rhythm: Normal rate and regular rhythm.      Pulses: Normal pulses.      Heart sounds: Normal heart sounds. No murmur heard.   No friction rub. No gallop.    Pulmonary:      Effort: Pulmonary effort is normal. No respiratory  distress.      Breath sounds: Normal breath sounds. No wheezing, rhonchi or rales.   Abdominal:      General: Abdomen is flat. Bowel sounds are normal. There is no distension.      Palpations: Abdomen is soft.      Tenderness: There is no abdominal tenderness (LUQ).   Musculoskeletal:         General: Swelling present.      Right lower leg: Edema present.      Left lower leg: Edema present.      Comments: 1+ pitting edema in B/l LE.   Skin:     General: Skin is warm and dry.      Coloration: Skin is not jaundiced.   Neurological:      General: No focal deficit present.      Mental Status: She is alert.   Psychiatric:         Mood and Affect: Mood normal.         Behavior: Behavior normal.           Labs:   HEMATOLOGY/ ONCOLOGY/ID:            Recent Labs     07/26/21  0530 07/27/21  0719 07/28/21  0335   WBC 10.8 12.5* 9.9   RBC 2.74* 3.01* 2.66*   HEMOGLOBIN 8.7* 9.4* 8.3*   HEMATOCRIT 29.1* 31.6* 28.1*   .2* 105.0* 105.6*   MCH 31.8 31.2 31.2   RDW 93.0* 90.7* 87.6*   PLATELETCT 271 296 255   MPV 10.7 11.2 10.9     Lab Results   Component Value Date    JBHFUZIW35 3701 (H) 07/09/2021    IBCINHON39 528 09/18/2018    FOLATE 8.0 07/16/2021    FERRITIN 383.0 (H) 07/13/2021    FERRITIN 136.0 07/09/2021    FERRITIN 94.1 06/30/2021    IRON 106 07/13/2021    IRON 113 07/09/2021    IRON 136 06/30/2021    TOTIRONBC 123 (L) 07/13/2021    TOTIRONBC 130 (L) 07/09/2021    TOTIRONBC 153 (L) 06/30/2021       RENAL:        Estimated GFR/CRCL = Estimated Creatinine Clearance: 120.2 mL/min (by C-G formula based on SCr of 0.61 mg/dL).  Recent Labs     07/26/21  0530 07/26/21  0530 07/26/21  1120 07/26/21  1120 07/26/21  1900 07/27/21  0719 07/28/21  0335   SODIUM 145   < > 147*   < > 145 146* 139   POTASSIUM 2.7*   < > 2.6*   < > 3.8 3.9 4.6   CHLORIDE 106   < > 103   < > 106 107 105   CO2 29   < > 27   < > 26 27 27   GLUCOSE 101*   < > 186*   < > 84 98 100*   BUN 9   < > 8   < > 8 8 11   CREATININE 0.55   < > 0.60   < > 0.63  0.63 0.61   CALCIUM 8.4*   < > 7.9*   < > 8.2* 8.6 8.6   MAGNESIUM  --   --  2.1  --   --  1.7  --    ALBUMIN 2.8*  --   --   --   --  3.1* 2.7*    < > = values in this interval not displayed.       GASTROINTESTINAL/ HEPATIC:          Recent Labs     07/26/21  0530 07/27/21  0719 07/28/21  0335 07/28/21  0900   ALTSGPT 84* 88* 75*  --    ASTSGOT 70* 73* 55*  --    ALKPHOSPHAT 238* 241* 200*  --    TBILIRUBIN 4.2* 4.0* 3.1*  --    ALBUMIN 2.8* 3.1* 2.7*  --    GLOBULIN 2.1 2.2 2.3  --    PREALBUMIN  --   --   --  12.0*     Lab Results   Component Value Date    AMMONIA 31 07/19/2021       ENDOCRINE:              Recent Labs     07/26/21  1900 07/27/21 0719 07/28/21  0335   GLUCOSE 84 98 100*     Lab Results   Component Value Date    HBA1C 6.0 (H) 06/29/2021    HBA1C 6.0 (H) 04/27/2021    HBA1C 6.0 (H) 06/19/2020    FREET4 1.27 06/29/2021    FREET4 1.40 06/16/2021    FREET4 1.16 06/15/2021    FREET3 2.46 05/12/2021    FREET3 3.10 12/09/2019    FREET3 4.28 (H) 10/18/2019    CORTISOL >63.4 (H) 06/22/2021    CORTISOL 5.1 06/17/2021    CORTISOL 2.7 05/25/2021       Imaging:   No new imaging overnight.      Problem Representation:  Donna Isaac  is a 56 y.o. y/o woman w/ PMHx DVT on eliquis, viral's dz w/ recurrent syncope, diverticulosis, HTN, hypothyroid, MI who presented on 7/9/2021 w/ hematochezia, new thrombocytopenia with decreased hemoglobin likely 2/2 DILI.  Anasarca improving, tolerating current diuresis regimen well w/ stable potassium.  No new syncopal events since increase in fludrocortisone, blood pressures tolerating increase well, with one outlier hypertensive pressure into 170 SBP in the early AM.      * Anasarca  Assessment & Plan  Pt anasarcic with pitting edema throughout both LE and in the UE as well.  This is likely multifactorial w/ components of heart failure leading to gut edema and acute exacerbation, adrenal insufficiency leading to electrolyte imbalances and liver disease all playing roles  into it's development. Anasarca improving, will decrease diuresis to maintenance dose now and monitor K.  -Bumex 0.5 mg QD  -40 mEq Potassium PO BID  -replete K and Mg as necessary  -Encourage ambulating from bed to chair as tolerated.  -Continue PT/OT treatment  -Will remove reyna today, lidocaine patch for back pain    Thrombocytopenia (HCC)- (present on admission)  Assessment & Plan  Resolved. Etiology still unknown at this time.  Extensive lab work-up has been used to rule-out many common causes.  Current suspected etiology is drug induced liver injury leading to thrombocytopenia, as she was on multiple medications and frequently in the hospital requiring the use of abx, recently requiring vanc and meropenem on 7/11.  In some case reports, DOACs have also contributed to thrombocytopenia, as well as has lasix.  ITP is unlikely, as she has had a good response to platelets during this admission.  DIC unlikely as she has never had a markedly decrease in fibrinogen or schistocytes seen on smear.  MRCP on 7/16 shows no intra or extrahepatic dilatation that would be concerning for cholestasis or obstruction.  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  The continuation of these events while lying in bed is potentially due to receiving stress dose steroids earlier in the admission w/o enough of a taper for her chronic adrenal insufficiency.  There is some concern that these episodes of passing out could also be seizures, as pt has a hx of seizure.  She hasn't taken any antiepileptics since 2009 and hasn't noticed any issues with seizures, she hasn't been followed by neurology since then, making this highly unlikely.  While working w/  PT on 7/21, she had an episode while trying to stand, orthostatic blood pressures at that time were normal for laying and sitting, but w/ an episode of syncope this would be considered positive orthostatic hypotension. Some hypertensive BP in the early AM, above 180 SBP, which could be related to positional hypertension while laying down, which has been persistent since increase in fludrocortisone.    -Carvedilol decreased to 6.25 mg PO BID  -Fludrocortisone to 0.1 PO BID  -Hydrocortisone 20 mg in AM, 10 mg in PM daily    Hypokalemia  Assessment & Plan  Improving, will continue to monitor as transition to maintenance dose of bumex.  -Repeat CMP AM  -Replete K and Mg as necessary    Diarrhea  Assessment & Plan  Pt w/ worsening diarrhea on 7/26, 7/27.  Improved today.  C. Diff negative.  Etiology is likely dietary, as she has a history of chronic diarrhea and she has been drinking a lot of juice lately which could contribute to osmotic diarrhea.  She is unable to take loperimide due to concerns for esophageal motility.  -Stool lactoferrin pending  -Fibercon 625 mg QAC  -Probiotics    Elevated liver enzymes  Assessment & Plan  Continues to improve.  Initially there was a cholestatic liver pattern, w/ mild elevations of AST and ALT, which have now peaked and are trending down.  Alk phos has peaked as well and is trending down.  This elevation in liver enzymes is likely 2/2 drug induced liver injury.  MRCP on 6/17 shows no concern for cholestasis at this time.  Hepatitis A, B, C have been non-reactive, HIV negative, AMA, KINZA negative, celiac screen negative, HSV IgG elevated, EBV IgG elevated, CMV IgG negative, alpha 1 antitrypsin negative.  RUQ US w/ elastography shows no concern for cirrhosis.  -Per GI, continue to follow LFTs - if daily improvement is not observed, please call GI back and will pursue liver biopsy at that time      Acute kidney injury (HCC)  Assessment & Plan  Stable.  New MARY LOU on 7/19.  Cr on 7/17 was  0.17, elevated up to 0.5 on 7/19.  This meets KDIGO criteria for elevation of 0.3 w/in 48 hours or 1.5x elevation over 7 days.  This is likely a prerenal process in the setting of decreased intravascular volume during diuresis.  Pt also w/ metabolic alkalosis, which is likely due to hypokalemia.  -AM CMP, continue to monitor    Lower GI bleed- (present on admission)  Assessment & Plan  3 episodes of hematochezia at rehab center, has resolved with no hematochezia or melena    History of pulmonary embolus (PE)- (present on admission)  Assessment & Plan  CTA of chest done on 4/27/2021 showed small left lower lobe subsegmental PE.  However repeat CTA on 5/10/2021 showed resolution.  Patient supposed to be on Eliquis for 3-month. Per vascular medicine she was not recommended to continue. Discontinued    Hypothyroidism- (present on admission)  Assessment & Plan  Hx of hypothyroidism.  Continue home Levothyroxine 75 mcg PO QD      Code Status: Full  DVT ppx: CI in the setting of low platelets  Diet: Cardiac diet  GI: Bowel regimen  T/L/D: CVC left IJV, reyna catheter  Disposition:  Anticipate <48 hours for placement, physiatry recommends SNF      Minh Jones DO  PGY-1, [facility]    Assessment and plan discussed with senior resident and attending physician.

## 2021-07-28 NOTE — DISCHARGE PLANNING
Anticipated Discharge Disposition: SNF    Action: Discussed in IDT rounds; pt is pending PMR review for return to [facility] rehab.    Received notification PMR is now recommending SNF placement.  CM spoke with pt at bedside; pt agrees to SNF referral. Choice for 1-[facility], 2-[facility], 3-[facility], 4-[facility] sent to [facility].    PASRR [id number]    Barriers to Discharge: SNF acceptance pending    Plan: HCM will follow up on SNF referrals.

## 2021-07-28 NOTE — PROGRESS NOTES
Report given to AILYN Anne. Dx, medications, O2 needs, and poc reviewed. Pt has no signs of distress or complaints at this time. Pt has no acute needs at this time. Care fully relinquished.

## 2021-07-29 ENCOUNTER — APPOINTMENT (OUTPATIENT)
Dept: RADIOLOGY | Facility: MEDICAL CENTER | Age: 56
DRG: 441 | End: 2021-07-29
Attending: STUDENT IN AN ORGANIZED HEALTH CARE EDUCATION/TRAINING PROGRAM
Payer: MEDICARE

## 2021-07-29 PROBLEM — N12 PYELONEPHRITIS: Status: ACTIVE | Noted: 2021-07-29

## 2021-07-29 LAB
ANION GAP SERPL CALC-SCNC: 9 MMOL/L (ref 7–16)
APPEARANCE UR: CLEAR
BACTERIA #/AREA URNS HPF: ABNORMAL /HPF
BILIRUB UR QL STRIP.AUTO: NEGATIVE
BUN SERPL-MCNC: 11 MG/DL (ref 8–22)
CALCIUM SERPL-MCNC: 8.1 MG/DL (ref 8.5–10.5)
CHLORIDE SERPL-SCNC: 107 MMOL/L (ref 96–112)
CO2 SERPL-SCNC: 25 MMOL/L (ref 20–33)
COLOR UR: YELLOW
CREAT SERPL-MCNC: 0.58 MG/DL (ref 0.5–1.4)
EPI CELLS #/AREA URNS HPF: NEGATIVE /HPF
ERYTHROCYTE [DISTWIDTH] IN BLOOD BY AUTOMATED COUNT: 85.7 FL (ref 35.9–50)
GLUCOSE SERPL-MCNC: 106 MG/DL (ref 65–99)
GLUCOSE UR STRIP.AUTO-MCNC: NEGATIVE MG/DL
HCT VFR BLD AUTO: 27.7 % (ref 37–47)
HGB BLD-MCNC: 8.2 G/DL (ref 12–16)
HYALINE CASTS #/AREA URNS LPF: ABNORMAL /LPF
KETONES UR STRIP.AUTO-MCNC: NEGATIVE MG/DL
LEUKOCYTE ESTERASE UR QL STRIP.AUTO: ABNORMAL
MCH RBC QN AUTO: 30.9 PG (ref 27–33)
MCHC RBC AUTO-ENTMCNC: 29.6 G/DL (ref 33.6–35)
MCV RBC AUTO: 104.5 FL (ref 81.4–97.8)
MICRO URNS: ABNORMAL
NITRITE UR QL STRIP.AUTO: POSITIVE
PH UR STRIP.AUTO: 7 [PH] (ref 5–8)
PLATELET # BLD AUTO: 249 K/UL (ref 164–446)
PMV BLD AUTO: 10.8 FL (ref 9–12.9)
POTASSIUM SERPL-SCNC: 3.8 MMOL/L (ref 3.6–5.5)
PROT UR QL STRIP: NEGATIVE MG/DL
RBC # BLD AUTO: 2.65 M/UL (ref 4.2–5.4)
RBC # URNS HPF: ABNORMAL /HPF
RBC UR QL AUTO: NEGATIVE
SODIUM SERPL-SCNC: 141 MMOL/L (ref 135–145)
SP GR UR STRIP.AUTO: 1.01
UROBILINOGEN UR STRIP.AUTO-MCNC: 0.2 MG/DL
WBC # BLD AUTO: 9.4 K/UL (ref 4.8–10.8)
WBC #/AREA URNS HPF: ABNORMAL /HPF

## 2021-07-29 PROCEDURE — A9270 NON-COVERED ITEM OR SERVICE: HCPCS | Performed by: INTERNAL MEDICINE

## 2021-07-29 PROCEDURE — 700102 HCHG RX REV CODE 250 W/ 637 OVERRIDE(OP): Performed by: STUDENT IN AN ORGANIZED HEALTH CARE EDUCATION/TRAINING PROGRAM

## 2021-07-29 PROCEDURE — 85027 COMPLETE CBC AUTOMATED: CPT

## 2021-07-29 PROCEDURE — 700102 HCHG RX REV CODE 250 W/ 637 OVERRIDE(OP): Performed by: INTERNAL MEDICINE

## 2021-07-29 PROCEDURE — 81001 URINALYSIS AUTO W/SCOPE: CPT

## 2021-07-29 PROCEDURE — A9270 NON-COVERED ITEM OR SERVICE: HCPCS | Performed by: STUDENT IN AN ORGANIZED HEALTH CARE EDUCATION/TRAINING PROGRAM

## 2021-07-29 PROCEDURE — 700111 HCHG RX REV CODE 636 W/ 250 OVERRIDE (IP): Performed by: STUDENT IN AN ORGANIZED HEALTH CARE EDUCATION/TRAINING PROGRAM

## 2021-07-29 PROCEDURE — 700101 HCHG RX REV CODE 250: Performed by: INTERNAL MEDICINE

## 2021-07-29 PROCEDURE — 87077 CULTURE AEROBIC IDENTIFY: CPT

## 2021-07-29 PROCEDURE — 99232 SBSQ HOSP IP/OBS MODERATE 35: CPT | Mod: GC | Performed by: INTERNAL MEDICINE

## 2021-07-29 PROCEDURE — 80048 BASIC METABOLIC PNL TOTAL CA: CPT

## 2021-07-29 PROCEDURE — 87186 SC STD MICRODIL/AGAR DIL: CPT

## 2021-07-29 PROCEDURE — 770006 HCHG ROOM/CARE - MED/SURG/GYN SEMI*

## 2021-07-29 PROCEDURE — 700102 HCHG RX REV CODE 250 W/ 637 OVERRIDE(OP): Performed by: FAMILY MEDICINE

## 2021-07-29 PROCEDURE — A9270 NON-COVERED ITEM OR SERVICE: HCPCS | Performed by: FAMILY MEDICINE

## 2021-07-29 PROCEDURE — 74176 CT ABD & PELVIS W/O CONTRAST: CPT

## 2021-07-29 PROCEDURE — 87086 URINE CULTURE/COLONY COUNT: CPT

## 2021-07-29 RX ORDER — GRANULES FOR ORAL 3 G/1
3 POWDER ORAL ONCE
Status: COMPLETED | OUTPATIENT
Start: 2021-07-29 | End: 2021-07-29

## 2021-07-29 RX ADMIN — LEVOTHYROXINE SODIUM 75 MCG: 0.07 TABLET ORAL at 05:19

## 2021-07-29 RX ADMIN — COLESEVELAM HYDROCHLORIDE 625 MG: 625 TABLET, COATED ORAL at 20:04

## 2021-07-29 RX ADMIN — COLESEVELAM HYDROCHLORIDE 625 MG: 625 TABLET, COATED ORAL at 08:28

## 2021-07-29 RX ADMIN — CALCITONIN SALMON 200 UNITS: 200 SPRAY, METERED NASAL at 20:08

## 2021-07-29 RX ADMIN — FENTANYL CITRATE 25 MCG: 50 INJECTION, SOLUTION INTRAMUSCULAR; INTRAVENOUS at 09:47

## 2021-07-29 RX ADMIN — GABAPENTIN 400 MG: 400 CAPSULE ORAL at 16:39

## 2021-07-29 RX ADMIN — GABAPENTIN 400 MG: 400 CAPSULE ORAL at 05:19

## 2021-07-29 RX ADMIN — CALCIUM POLYCARBOPHIL 625 MG: 625 TABLET, FILM COATED ORAL at 08:28

## 2021-07-29 RX ADMIN — ENOXAPARIN SODIUM 100 MG: 100 INJECTION SUBCUTANEOUS at 05:20

## 2021-07-29 RX ADMIN — GRANULES FOR ORAL SOLUTION 3 G: 3 POWDER ORAL at 16:39

## 2021-07-29 RX ADMIN — DULOXETINE HYDROCHLORIDE 60 MG: 60 CAPSULE, DELAYED RELEASE ORAL at 20:04

## 2021-07-29 RX ADMIN — POTASSIUM CHLORIDE 40 MEQ: 1500 TABLET, EXTENDED RELEASE ORAL at 05:19

## 2021-07-29 RX ADMIN — Medication 1 CAPSULE: at 08:28

## 2021-07-29 RX ADMIN — CARVEDILOL 6.25 MG: 6.25 TABLET, FILM COATED ORAL at 08:28

## 2021-07-29 RX ADMIN — CALCIUM POLYCARBOPHIL 625 MG: 625 TABLET, FILM COATED ORAL at 16:40

## 2021-07-29 RX ADMIN — OMEPRAZOLE 40 MG: 20 CAPSULE, DELAYED RELEASE ORAL at 05:19

## 2021-07-29 RX ADMIN — CARVEDILOL 6.25 MG: 6.25 TABLET, FILM COATED ORAL at 16:39

## 2021-07-29 RX ADMIN — FLUDROCORTISONE ACETATE 0.1 MG: 0.1 TABLET ORAL at 16:39

## 2021-07-29 RX ADMIN — COLESEVELAM HYDROCHLORIDE 625 MG: 625 TABLET, COATED ORAL at 16:40

## 2021-07-29 RX ADMIN — FLUDROCORTISONE ACETATE 0.1 MG: 0.1 TABLET ORAL at 05:19

## 2021-07-29 RX ADMIN — CALCIUM POLYCARBOPHIL 625 MG: 625 TABLET, FILM COATED ORAL at 12:56

## 2021-07-29 RX ADMIN — HYDROCORTISONE 10 MG: 20 TABLET ORAL at 16:40

## 2021-07-29 RX ADMIN — BUMETANIDE 0.5 MG: 0.5 TABLET ORAL at 05:19

## 2021-07-29 RX ADMIN — HYDRALAZINE HYDROCHLORIDE 20 MG: 20 INJECTION INTRAMUSCULAR; INTRAVENOUS at 05:28

## 2021-07-29 RX ADMIN — AMITRIPTYLINE HYDROCHLORIDE 10 MG: 10 TABLET, FILM COATED ORAL at 05:36

## 2021-07-29 RX ADMIN — ENOXAPARIN SODIUM 100 MG: 100 INJECTION SUBCUTANEOUS at 16:39

## 2021-07-29 RX ADMIN — POTASSIUM CHLORIDE 40 MEQ: 1500 TABLET, EXTENDED RELEASE ORAL at 16:39

## 2021-07-29 RX ADMIN — Medication 2000 UNITS: at 05:19

## 2021-07-29 RX ADMIN — HYDROCORTISONE 20 MG: 20 TABLET ORAL at 05:19

## 2021-07-29 RX ADMIN — LIOTHYRONINE SODIUM 25 MCG: 25 TABLET ORAL at 20:03

## 2021-07-29 ASSESSMENT — ENCOUNTER SYMPTOMS
PALPITATIONS: 0
DIZZINESS: 0
DIARRHEA: 0
VOMITING: 0
BACK PAIN: 1
COUGH: 0
FLANK PAIN: 1
CHILLS: 0
ABDOMINAL PAIN: 0
LOSS OF CONSCIOUSNESS: 0
NAUSEA: 0
SHORTNESS OF BREATH: 0
FEVER: 0

## 2021-07-29 ASSESSMENT — PAIN DESCRIPTION - PAIN TYPE
TYPE: CHRONIC PAIN
TYPE: CHRONIC PAIN

## 2021-07-29 NOTE — ASSESSMENT & PLAN NOTE
Pt w/ new onset backpain on 7/28, initially resolved with lidocaine patch.  Overnight started worsening becoming sharper constantly, having intermittent sharp stabs which appear to be in colicky fashion.  + CVA tenderness.  UA shows positive UTI.  Pt has had reyna catheter placed for over 2 weeks.  This pain is likely due to pyelonephritis, in the setting of recent long term reyna use there is some concern for CAUTI.  Antibiotic choice complicated by extensive allergies.    -Urine cultures pending   -Fosfomycin 3 g packet today and repeat on 7/31

## 2021-07-29 NOTE — PROGRESS NOTES
Daily Progress Note:     Date of Service: 7/29/2021  Primary Team: [employer] [facility]   Attending: Diane Momin M.D.   Senior Resident: Dr. Schmidt  Intern: Dr. Jones  Contact:  [phone]    Chief Complaint:   Abdominal pain    Subjective:  Since yesterday she has been having some right sided back pain, which has progressively gotten worse.  The pain is constant, but she has been having intermittent stabbing pains in her right flank as well.  Also reports burning with urination and increased urinary frequency.      Consultants/Specialty:  General Surgery - [facility] - [facility]  ID- Lynnette ID    Review of Systems:    Review of Systems   Constitutional: Negative for chills and fever.   Respiratory: Negative for cough and shortness of breath.    Cardiovascular: Negative for chest pain and palpitations.   Gastrointestinal: Negative for abdominal pain, diarrhea, nausea and vomiting.   Genitourinary: Positive for flank pain. Negative for dysuria.   Musculoskeletal: Positive for back pain.   Neurological: Negative for dizziness and loss of consciousness.       Objective Data:   Physical Exam:   Vitals:   Temp:  [36.2 °C (97.1 °F)-36.9 °C (98.4 °F)] 36.7 °C (98.1 °F)  Pulse:  [] 101  Resp:  [16-18] 17  BP: (110-183)/() 183/104  SpO2:  [94 %-96 %] 96 %    Physical Exam  Constitutional:       General: She is not in acute distress.     Appearance: She is not toxic-appearing.   HENT:      Head: Normocephalic and atraumatic.      Mouth/Throat:      Mouth: Mucous membranes are moist.      Pharynx: Oropharynx is clear.   Eyes:      General: No scleral icterus.     Extraocular Movements: Extraocular movements intact.   Cardiovascular:      Rate and Rhythm: Normal rate and regular rhythm.      Pulses: Normal pulses.      Heart sounds: Normal heart sounds. No murmur heard.   No friction rub. No gallop.    Pulmonary:      Effort: Pulmonary effort is normal. No respiratory distress.      Breath sounds: Normal breath  sounds. No wheezing, rhonchi or rales.   Abdominal:      General: Abdomen is flat. Bowel sounds are normal. There is no distension.      Palpations: Abdomen is soft.      Tenderness: There is abdominal tenderness (right sided, suprapubic). There is right CVA tenderness. There is no left CVA tenderness, guarding or rebound.   Musculoskeletal:         General: Swelling present.      Right lower leg: Edema present.      Left lower leg: Edema present.      Comments: 1+ pitting edema in B/l LE.   Skin:     General: Skin is warm and dry.      Coloration: Skin is not jaundiced.   Neurological:      General: No focal deficit present.      Mental Status: She is alert.   Psychiatric:         Mood and Affect: Mood normal.         Behavior: Behavior normal.           Labs:   HEMATOLOGY/ ONCOLOGY/ID:            Recent Labs     07/27/21  0719 07/28/21  0335 07/29/21  0230   WBC 12.5* 9.9 9.4   RBC 3.01* 2.66* 2.65*   HEMOGLOBIN 9.4* 8.3* 8.2*   HEMATOCRIT 31.6* 28.1* 27.7*   .0* 105.6* 104.5*   MCH 31.2 31.2 30.9   RDW 90.7* 87.6* 85.7*   PLATELETCT 296 255 249   MPV 11.2 10.9 10.8     Lab Results   Component Value Date    HSBRFKLL85 3701 (H) 07/09/2021    STAFSIKI81 528 09/18/2018    FOLATE 8.0 07/16/2021    FERRITIN 383.0 (H) 07/13/2021    FERRITIN 136.0 07/09/2021    FERRITIN 94.1 06/30/2021    IRON 106 07/13/2021    IRON 113 07/09/2021    IRON 136 06/30/2021    TOTIRONBC 123 (L) 07/13/2021    TOTIRONBC 130 (L) 07/09/2021    TOTIRONBC 153 (L) 06/30/2021       RENAL:        Estimated GFR/CRCL = Estimated Creatinine Clearance: 125.7 mL/min (by C-G formula based on SCr of 0.58 mg/dL).  Recent Labs     07/26/21  1120 07/26/21  1900 07/27/21  0719 07/28/21  0335 07/29/21  0230   SODIUM 147*   < > 146* 139 141   POTASSIUM 2.6*   < > 3.9 4.6 3.8   CHLORIDE 103   < > 107 105 107   CO2 27   < > 27 27 25   GLUCOSE 186*   < > 98 100* 106*   BUN 8   < > 8 11 11   CREATININE 0.60   < > 0.63 0.61 0.58   CALCIUM 7.9*   < > 8.6 8.6  8.1*   MAGNESIUM 2.1  --  1.7  --   --    ALBUMIN  --   --  3.1* 2.7*  --     < > = values in this interval not displayed.       GASTROINTESTINAL/ HEPATIC:          Recent Labs     07/27/21  0719 07/28/21  0335 07/28/21  0900   ALTSGPT 88* 75*  --    ASTSGOT 73* 55*  --    ALKPHOSPHAT 241* 200*  --    TBILIRUBIN 4.0* 3.1*  --    ALBUMIN 3.1* 2.7*  --    GLOBULIN 2.2 2.3  --    PREALBUMIN  --   --  12.0*     Lab Results   Component Value Date    AMMONIA 31 07/19/2021       ENDOCRINE:              Recent Labs     07/27/21  0719 07/28/21 0335 07/29/21  0230   GLUCOSE 98 100* 106*     Lab Results   Component Value Date    HBA1C 6.0 (H) 06/29/2021    HBA1C 6.0 (H) 04/27/2021    HBA1C 6.0 (H) 06/19/2020    FREET4 1.27 06/29/2021    FREET4 1.40 06/16/2021    FREET4 1.16 06/15/2021    FREET3 2.46 05/12/2021    FREET3 3.10 12/09/2019    FREET3 4.28 (H) 10/18/2019    CORTISOL >63.4 (H) 06/22/2021    CORTISOL 5.1 06/17/2021    CORTISOL 2.7 05/25/2021       Imaging:   No new imaging overnight.      Problem Representation:  Donna Isaac  is a 56 y.o. y/o woman w/ PMHx DVT on eliquis, viral's dz w/ recurrent syncope, diverticulosis, HTN, hypothyroid, MI who presented on 7/9/2021 w/ hematochezia, new thrombocytopenia with decreased hemoglobin likely 2/2 DILI.  Anasarca improving, tolerating current diuresis regimen well w/ stable potassium.  Now with right sided flank pain and UA positive for UTI concerning for pyelonephritis.      * Pyelonephritis  Assessment & Plan  Pt w/ new onset backpain on 7/28, initially resolved with lidocaine patch.  Overnight started worsening becoming sharper constantly, having intermittent sharp stabs which appear to be in colicky fashion.  + CVA tenderness.  UA shows positive UTI.  Pt has had reyna catheter placed for over 2 weeks.  This pain is likely due to pyelonephritis, in the setting of recent long term reyna use there is some concern for CAUTI.  Antibiotic choice complicated by extensive  allergies.    -Urine cultures pending   -Fosfomycin 3 g packet today and repeat on 7/31    Anasarca  Assessment & Plan  Pt anasarcic with pitting edema throughout both LE and in the UE as well.  This is likely multifactorial w/ components of heart failure leading to gut edema and acute exacerbation, adrenal insufficiency leading to electrolyte imbalances and liver disease all playing roles into it's development. Anasarca improving, will decrease diuresis to maintenance dose now and monitor K.  -Bumex 0.5 mg QD  -40 mEq Potassium PO BID  -replete K and Mg as necessary  -Encourage ambulating from bed to chair as tolerated.  -Continue PT/OT treatment    Elevated liver enzymes  Assessment & Plan  Continues to improve.  Initially there was a cholestatic liver pattern, w/ mild elevations of AST and ALT, which have now peaked and are trending down.  Alk phos has peaked as well and is trending down.  This elevation in liver enzymes is likely 2/2 drug induced liver injury.  MRCP on 6/17 shows no concern for cholestasis at this time.  Hepatitis A, B, C have been non-reactive, HIV negative, AMA, KINZA negative, celiac screen negative, HSV IgG elevated, EBV IgG elevated, CMV IgG negative, alpha 1 antitrypsin negative.  RUQ US w/ elastography shows no concern for cirrhosis.  -Per GI, continue to follow LFTs - if daily improvement is not observed, please call GI back and will pursue liver biopsy at that time      Thrombocytopenia (HCC)- (present on admission)  Assessment & Plan  Resolved. Etiology still unknown at this time.  Extensive lab work-up has been used to rule-out many common causes.  Current suspected etiology is drug induced liver injury leading to thrombocytopenia, as she was on multiple medications and frequently in the hospital requiring the use of abx, recently requiring vanc and meropenem on 7/11.  In some case reports, DOACs have also contributed to thrombocytopenia, as well as has lasix.  ITP is unlikely, as she  has had a good response to platelets during this admission.  DIC unlikely as she has never had a markedly decrease in fibrinogen or schistocytes seen on smear.  MRCP on 7/16 shows no intra or extrahepatic dilatation that would be concerning for cholestasis or obstruction.  -Consider Liver Bx pending recovery of platelets, per GI don't need bx as long as LFTs continue to resolve  -AM CBC  -Transfuse plt if <20k      Adrenal insufficiency ([place]'s disease) (HCC)- (present on admission)  Assessment & Plan  Pt w/ hx of viral's disease leading to recurrent syncopal events that appear to be orthostatic.  The continuation of these events while lying in bed is potentially due to receiving stress dose steroids earlier in the admission w/o enough of a taper for her chronic adrenal insufficiency.  There is some concern that these episodes of passing out could also be seizures, as pt has a hx of seizure.  She hasn't taken any antiepileptics since 2009 and hasn't noticed any issues with seizures, she hasn't been followed by neurology since then, making this highly unlikely.  While working w/ PT on 7/21, she had an episode while trying to stand, orthostatic blood pressures at that time were normal for laying and sitting, but w/ an episode of syncope this would be considered positive orthostatic hypotension. Some hypertensive BP in the early AM, above 180 SBP, which could be related to positional hypertension while laying down, which has been persistent since increase in fludrocortisone.    -Carvedilol decreased to 6.25 mg PO BID  -Fludrocortisone to 0.1 PO BID  -Hydrocortisone 20 mg in AM, 10 mg in PM daily    Hypokalemia  Assessment & Plan  Improving, will continue to monitor as transition to maintenance dose of bumex.  -Repeat CMP AM  -Replete K and Mg as necessary    Diarrhea  Assessment & Plan  Pt w/ worsening diarrhea on 7/26, 7/27.  Improved today.  C. Diff negative.  Etiology is likely dietary, as she has a history of  chronic diarrhea and she has been drinking a lot of juice lately which could contribute to osmotic diarrhea.  She is unable to take loperimide due to concerns for esophageal motility.  -Stool lactoferrin pending  -Fibercon 625 mg QAC  -Probiotics    Lower GI bleed- (present on admission)  Assessment & Plan  3 episodes of hematochezia at rehab center, has resolved with no hematochezia or melena    History of pulmonary embolus (PE)- (present on admission)  Assessment & Plan  CTA of chest done on 4/27/2021 showed small left lower lobe subsegmental PE.  However repeat CTA on 5/10/2021 showed resolution.  Patient supposed to be on Eliquis for 3-month. Per vascular medicine she was not recommended to continue. Discontinued    Acute kidney injury (HCC)  Assessment & Plan  Stable.  New MARY LOU on 7/19.  Cr on 7/17 was 0.17, elevated up to 0.5 on 7/19.  This meets KDIGO criteria for elevation of 0.3 w/in 48 hours or 1.5x elevation over 7 days.  This is likely a prerenal process in the setting of decreased intravascular volume during diuresis.  Pt also w/ metabolic alkalosis, which is likely due to hypokalemia.  -AM CMP, continue to monitor    Hypothyroidism- (present on admission)  Assessment & Plan  Hx of hypothyroidism.  Continue home Levothyroxine 75 mcg PO QD      Code Status: Full  DVT ppx: CI in the setting of low platelets  Diet: Cardiac diet  GI: Bowel regimen  T/L/D: CVC left IJV,  Disposition:  Anticipate 24-48 hour for monitoring stabilization of pyelonephritis    Minh Jones DO  PGY-1, [facility]    Assessment and plan discussed with senior resident and attending physician.

## 2021-07-29 NOTE — THERAPY
Missed Therapy     Patient Name: Donna Isaac  Age:  56 y.o., Sex:  female  Medical Record #: [id number]  Today's Date: 7/29/2021 07/29/21 1516   Interdisciplinary Plan of Care Collaboration   Collaboration Comments attempted therapy follow up session this pm. Pt declining activity reporting her back is sore and painful when she gets up. Educated on benefits of mobility versus laying supine for prolonged period of time. Pt politely refusing today stating she will tomorrow.

## 2021-07-29 NOTE — PROGRESS NOTES
4 Eyes Skin Assessment Completed by AILYN Goldman and AILYN Okeefe.        Head Jaundice  Ears Blanching  Nose Blanching  Neck Blanching, Bruising and Jaundice  Breast/Chest Bruising, Jaundice and Edema  Shoulder Blades Blanching, bruising   Spine Blanching, bruising  (R) Arm/Elbow/Hand Blanching, Bruising, Abrasion, Swelling, Weeping and Edema  (L) Arm/Elbow/Hand Blanching, Bruising, Abrasion, Scab, Weeping and Edema  Abdomen Blanching, Scab and Bruising  Groin Redness, Blanching, Excoriation, Rash and Swelling  Scrotum/Coccyx/Buttocks Redness, reyna in place, barrier cream used. Excoriation and Moisture Fissure  (R) Leg Blanching, Bruising, Swelling, Edema  (L) Leg Blanching, Bruising, Swelling, Edema  (R) Heel/Foot/Toe Redness, Blanching, Boggy, Swelling and Edema  (L) Heel/Foot/Toe Redness, Blanching, Boggy, Bruising, Swelling, Scab and Edema              Devices In Places Tele Box, Central Line        Interventions In Place Heel Mepilex, Waffle Overlay, Pillows, Q2 Turns, frequent linen changes to ensure pt stays dry, Barrier Cream, Dri-Jack Pads, Heels Loaded W/Pillows and Pressure Redistribution Mattress

## 2021-07-29 NOTE — CARE PLAN
The patient is Stable - Low risk of patient condition declining or worsening    Shift Goals  Clinical Goals: manage BP, pain management  Patient Goals: up to chair    Progress made toward(s) clinical / shift goals:    Problem: Knowledge Deficit - Standard  Goal: Patient and family/care givers will demonstrate understanding of plan of care, disease process/condition, diagnostic tests and medications  Outcome: Progressing     Problem: Fall Risk  Goal: Patient will remain free from falls  Outcome: Progressing     Problem: Skin Integrity  Goal: Skin integrity is maintained or improved  Outcome: Progressing     Problem: Pain - Standard  Goal: Alleviation of pain or a reduction in pain to the patient’s comfort goal  Outcome: Progressing

## 2021-07-29 NOTE — PROGRESS NOTES
Report received at bedside, pt care assumed, pt is medical. Pt aaox4, no signs of distress noted at this time. Patient resting comfortably in bed. POC discussed with pt and verbalizes no questions. Pt c/o of no pain. Pt denies any additional needs at this time. Bed in lowest position, pt educated on fall risk and verbalized understanding, call light within reach, will continue hourly rounding.

## 2021-07-30 VITALS
HEART RATE: 99 BPM | BODY MASS INDEX: 39.18 KG/M2 | TEMPERATURE: 98 F | HEIGHT: 64 IN | SYSTOLIC BLOOD PRESSURE: 150 MMHG | RESPIRATION RATE: 18 BRPM | OXYGEN SATURATION: 93 % | DIASTOLIC BLOOD PRESSURE: 90 MMHG | WEIGHT: 229.5 LBS

## 2021-07-30 PROBLEM — K92.2 LOWER GI BLEED: Status: RESOLVED | Noted: 2021-07-09 | Resolved: 2021-07-30

## 2021-07-30 PROBLEM — R19.7 DIARRHEA: Status: RESOLVED | Noted: 2021-07-27 | Resolved: 2021-07-30

## 2021-07-30 PROBLEM — N17.9 ACUTE KIDNEY INJURY (HCC): Status: RESOLVED | Noted: 2021-01-26 | Resolved: 2021-07-30

## 2021-07-30 PROBLEM — D69.6 THROMBOCYTOPENIA (HCC): Status: RESOLVED | Noted: 2021-07-07 | Resolved: 2021-07-30

## 2021-07-30 LAB
ALBUMIN SERPL BCP-MCNC: 2.7 G/DL (ref 3.2–4.9)
ALBUMIN/GLOB SERPL: 1.4 G/DL
ALP SERPL-CCNC: 165 U/L (ref 30–99)
ALT SERPL-CCNC: 58 U/L (ref 2–50)
ANION GAP SERPL CALC-SCNC: 12 MMOL/L (ref 7–16)
ANISOCYTOSIS BLD QL SMEAR: ABNORMAL
AST SERPL-CCNC: 36 U/L (ref 12–45)
BASOPHILS # BLD AUTO: 0.4 % (ref 0–1.8)
BASOPHILS # BLD: 0.03 K/UL (ref 0–0.12)
BILIRUB SERPL-MCNC: 2.6 MG/DL (ref 0.1–1.5)
BUN SERPL-MCNC: 8 MG/DL (ref 8–22)
CALCIUM SERPL-MCNC: 8.3 MG/DL (ref 8.5–10.5)
CHLORIDE SERPL-SCNC: 110 MMOL/L (ref 96–112)
CO2 SERPL-SCNC: 23 MMOL/L (ref 20–33)
COMMENT 1642: NORMAL
CREAT SERPL-MCNC: 0.42 MG/DL (ref 0.5–1.4)
EOSINOPHIL # BLD AUTO: 0.07 K/UL (ref 0–0.51)
EOSINOPHIL NFR BLD: 1 % (ref 0–6.9)
ERYTHROCYTE [DISTWIDTH] IN BLOOD BY AUTOMATED COUNT: 81.2 FL (ref 35.9–50)
GLOBULIN SER CALC-MCNC: 2 G/DL (ref 1.9–3.5)
GLUCOSE SERPL-MCNC: 93 MG/DL (ref 65–99)
HCT VFR BLD AUTO: 27.2 % (ref 37–47)
HGB BLD-MCNC: 8 G/DL (ref 12–16)
IMM GRANULOCYTES # BLD AUTO: 0.05 K/UL (ref 0–0.11)
IMM GRANULOCYTES NFR BLD AUTO: 0.7 % (ref 0–0.9)
LYMPHOCYTES # BLD AUTO: 1.45 K/UL (ref 1–4.8)
LYMPHOCYTES NFR BLD: 21.4 % (ref 22–41)
MACROCYTES BLD QL SMEAR: ABNORMAL
MCH RBC QN AUTO: 30.8 PG (ref 27–33)
MCHC RBC AUTO-ENTMCNC: 29.4 G/DL (ref 33.6–35)
MCV RBC AUTO: 104.6 FL (ref 81.4–97.8)
MONOCYTES # BLD AUTO: 0.62 K/UL (ref 0–0.85)
MONOCYTES NFR BLD AUTO: 9.2 % (ref 0–13.4)
MORPHOLOGY BLD-IMP: NORMAL
NEUTROPHILS # BLD AUTO: 4.55 K/UL (ref 2–7.15)
NEUTROPHILS NFR BLD: 67.3 % (ref 44–72)
NRBC # BLD AUTO: 0 K/UL
NRBC BLD-RTO: 0 /100 WBC
PLATELET # BLD AUTO: 240 K/UL (ref 164–446)
PLATELET BLD QL SMEAR: NORMAL
PMV BLD AUTO: 10.6 FL (ref 9–12.9)
POLYCHROMASIA BLD QL SMEAR: NORMAL
POTASSIUM SERPL-SCNC: 3.3 MMOL/L (ref 3.6–5.5)
PROT SERPL-MCNC: 4.7 G/DL (ref 6–8.2)
RBC # BLD AUTO: 2.6 M/UL (ref 4.2–5.4)
RBC BLD AUTO: PRESENT
SODIUM SERPL-SCNC: 145 MMOL/L (ref 135–145)
WBC # BLD AUTO: 6.8 K/UL (ref 4.8–10.8)

## 2021-07-30 PROCEDURE — 85025 COMPLETE CBC W/AUTO DIFF WBC: CPT

## 2021-07-30 PROCEDURE — 94760 N-INVAS EAR/PLS OXIMETRY 1: CPT

## 2021-07-30 PROCEDURE — 36415 COLL VENOUS BLD VENIPUNCTURE: CPT

## 2021-07-30 PROCEDURE — 700102 HCHG RX REV CODE 250 W/ 637 OVERRIDE(OP): Performed by: STUDENT IN AN ORGANIZED HEALTH CARE EDUCATION/TRAINING PROGRAM

## 2021-07-30 PROCEDURE — A9270 NON-COVERED ITEM OR SERVICE: HCPCS | Performed by: STUDENT IN AN ORGANIZED HEALTH CARE EDUCATION/TRAINING PROGRAM

## 2021-07-30 PROCEDURE — A9270 NON-COVERED ITEM OR SERVICE: HCPCS | Performed by: INTERNAL MEDICINE

## 2021-07-30 PROCEDURE — 700102 HCHG RX REV CODE 250 W/ 637 OVERRIDE(OP): Performed by: INTERNAL MEDICINE

## 2021-07-30 PROCEDURE — A9270 NON-COVERED ITEM OR SERVICE: HCPCS | Performed by: FAMILY MEDICINE

## 2021-07-30 PROCEDURE — 87040 BLOOD CULTURE FOR BACTERIA: CPT

## 2021-07-30 PROCEDURE — 80053 COMPREHEN METABOLIC PANEL: CPT

## 2021-07-30 PROCEDURE — 700111 HCHG RX REV CODE 636 W/ 250 OVERRIDE (IP): Performed by: STUDENT IN AN ORGANIZED HEALTH CARE EDUCATION/TRAINING PROGRAM

## 2021-07-30 PROCEDURE — 99239 HOSP IP/OBS DSCHRG MGMT >30: CPT | Mod: GC | Performed by: INTERNAL MEDICINE

## 2021-07-30 PROCEDURE — 700102 HCHG RX REV CODE 250 W/ 637 OVERRIDE(OP): Performed by: FAMILY MEDICINE

## 2021-07-30 RX ORDER — CALCIUM POLYCARBOPHIL 625 MG
625 TABLET ORAL DAILY
Status: SHIPPED
Start: 2021-07-30 | End: 2021-08-13

## 2021-07-30 RX ORDER — LACTOBACILLUS RHAMNOSUS GG 10B CELL
1 CAPSULE ORAL
Status: SHIPPED
Start: 2021-07-31 | End: 2021-08-13

## 2021-07-30 RX ORDER — CARVEDILOL 6.25 MG/1
6.25 TABLET ORAL 2 TIMES DAILY WITH MEALS
Qty: 60 TABLET | Status: SHIPPED
Start: 2021-07-30 | End: 2021-12-01

## 2021-07-30 RX ORDER — GRANULES FOR ORAL 3 G/1
3 POWDER ORAL ONCE
Qty: 1 EACH | Refills: 0 | Status: SHIPPED
Start: 2021-07-31 | End: 2021-07-31

## 2021-07-30 RX ORDER — BUMETANIDE 0.5 MG/1
0.5 TABLET ORAL DAILY
Qty: 30 TABLET | Status: SHIPPED
Start: 2021-07-31 | End: 2021-08-13

## 2021-07-30 RX ORDER — GRANULES FOR ORAL 3 G/1
3 POWDER ORAL ONCE
Status: DISCONTINUED | OUTPATIENT
Start: 2021-07-31 | End: 2021-07-30 | Stop reason: HOSPADM

## 2021-07-30 RX ADMIN — CALCIUM POLYCARBOPHIL 625 MG: 625 TABLET, FILM COATED ORAL at 10:30

## 2021-07-30 RX ADMIN — GABAPENTIN 400 MG: 400 CAPSULE ORAL at 04:45

## 2021-07-30 RX ADMIN — HYDROCORTISONE 20 MG: 20 TABLET ORAL at 04:44

## 2021-07-30 RX ADMIN — OMEPRAZOLE 40 MG: 20 CAPSULE, DELAYED RELEASE ORAL at 04:43

## 2021-07-30 RX ADMIN — CARVEDILOL 6.25 MG: 6.25 TABLET, FILM COATED ORAL at 07:30

## 2021-07-30 RX ADMIN — ENOXAPARIN SODIUM 100 MG: 100 INJECTION SUBCUTANEOUS at 04:42

## 2021-07-30 RX ADMIN — Medication 1 CAPSULE: at 07:30

## 2021-07-30 RX ADMIN — COLESEVELAM HYDROCHLORIDE 625 MG: 625 TABLET, COATED ORAL at 10:30

## 2021-07-30 RX ADMIN — COLESEVELAM HYDROCHLORIDE 625 MG: 625 TABLET, COATED ORAL at 14:54

## 2021-07-30 RX ADMIN — BUMETANIDE 0.5 MG: 0.5 TABLET ORAL at 04:45

## 2021-07-30 RX ADMIN — POTASSIUM CHLORIDE 40 MEQ: 1500 TABLET, EXTENDED RELEASE ORAL at 04:44

## 2021-07-30 RX ADMIN — FLUDROCORTISONE ACETATE 0.1 MG: 0.1 TABLET ORAL at 04:44

## 2021-07-30 RX ADMIN — Medication 2000 UNITS: at 04:43

## 2021-07-30 RX ADMIN — LEVOTHYROXINE SODIUM 75 MCG: 0.07 TABLET ORAL at 04:43

## 2021-07-30 RX ADMIN — AMITRIPTYLINE HYDROCHLORIDE 10 MG: 10 TABLET, FILM COATED ORAL at 04:44

## 2021-07-30 RX ADMIN — CALCIUM POLYCARBOPHIL 625 MG: 625 TABLET, FILM COATED ORAL at 07:30

## 2021-07-30 ASSESSMENT — PAIN DESCRIPTION - PAIN TYPE: TYPE: CHRONIC PAIN

## 2021-07-30 NOTE — PROGRESS NOTES
4 Eyes Skin Assessment Completed by Franki RN and AILYN Ramirez.    Head:WDL  Ears Blanching  Nose Blanching  Neck Blanching, Bruising and Jaundice  Breast/Chest Bruising, Jaundice and Edema  Shoulder Blades Blanching, bruising   Spine Blanching, bruising  (R) Arm/Elbow/Hand Blanching, Bruising, Abrasion, Swelling, Weeping and Edema  (L) Arm/Elbow/Hand Blanching, Bruising, Abrasion, Scab, Weeping and Edema  Abdomen Blanching, Scab and Bruising  Groin Redness, Blanching, Excoriation, Rash and Swelling  Scrotum/Coccyx/Buttocks Redness,barrier cream used. Excoriation and Moisture Fissure  (R) Leg Blanching, Bruising, Swelling, Edema  (L) Leg Blanching, Bruising, Swelling, Edema  (R) Heel/Foot/Toe Redness, Blanching, Boggy, Swelling and Edema  (L) Heel/Foot/Toe Redness, Blanching, Boggy, Bruising, Swelling, Scab and Edema              Devices In Places Tele Box, CVC Left Side         Interventions In Place Heel Mepilex, Waffle Overlay, Pillows, Q2 Turns, frequent linen changes to ensure pt stays dry, Barrier Cream, Dri-Jack Pads, Heels Loaded W/Pillows and Pressure Redistribution Mattress

## 2021-07-30 NOTE — PROGRESS NOTES
Pt dc'd to advanced. IV and monitor removed; monitor room notified. Pt left unit via vin[facility] with [facility]. Personal belongings with pt when leaving unit. Pt given discharge instructions prior to leaving unit including where to  prescriptions and when to follow-up; verbalizes understanding. Copy of discharge instructions with pt and in the chart.    Pt left with home medications.   Report Given to advanced.

## 2021-07-30 NOTE — PROGRESS NOTES
4 Eyes Skin Assessment Completed by AILYN Martínez and AILYN Jules.     Head:WDL  Ears Blanching  Nose Blanching  Neck Blanching, Bruising and Jaundice. Central line on left neck removed. Dressing clean dry and intact.   Breast/Chest Bruising, Jaundice and Edema  Shoulder Blades Blanching, bruising   Spine Blanching, bruising  (R) Arm/Elbow/Hand Blanching, Bruising, Abrasion, Swelling, Weeping and Edema  (L) Arm/Elbow/Hand Blanching, Bruising, Abrasion, Scab, Weeping and Edema  Abdomen Blanching, Scab and Bruising  Groin Redness, Blanching, Excoriation, Rash and Swelling  Scrotum/Coccyx/Buttocks Redness,barrier cream used. Excoriation and Moisture Fissure  (R) Leg Blanching, Bruising, Swelling, Edema  (L) Leg Blanching, Bruising, Swelling, Edema  (R) Heel/Foot/Toe Redness, Blanching, Boggy, Swelling and Edema Multiple scabs.   (L) Heel/Foot/Toe Redness, Blanching, Boggy, Bruising, Swelling, Scab and Edema. Multiple scabs.               Devices In Places Tele Box         Interventions In Place Heel Mepilex, Waffle Overlay, Pillows, Q2 Turns, frequent linen changes to ensure pt stays dry, Barrier Cream, Dri-Jack Pads, Heels Loaded W/Pillows and Pressure Redistribution Mattress

## 2021-07-30 NOTE — DISCHARGE SUMMARY
Discharge Summary    Date of Admission: 7/9/2021  Date of Discharge: 7/30/2021  Discharging Attending: Diane Momin M.D.   Discharging Senior Resident: Dr. Schmidt  Discharging Intern: Dr. Jones    CHIEF COMPLAINT ON ADMISSION  Chief Complaint   Patient presents with   • Bloody Stools     BIB EMS from rehab facility for bloody stools. Most recent CBC resulted platelet count of 18. Pt had episode of syncope at 1105 that lasted approximately 20 sec. Pt states LOC is common for her.   • Loss of Consciousness     Pt had episode of syncope at 1105 that lasted approximately 20 sec. Pt states LOC is common for her.       Reason for Admission  Disseminated intravascular coagulation  Drug-induced liver injury  Thrombocytopenia  Lower GI bleed  Anasarca  Pyelonephritis  Valentín's disease    Admission Date  7/9/2021    CODE STATUS  Full Code    HPI & HOSPITAL COURSE  This is a 56 y.o. 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    On presentation she was also found to have newly worsening liver function tests.    #Drug-induced liver injury: She presented with significant elevations of AST, ALT and alkaline phosphatase.  Gastroenterology was consulted.  Work-up including hepatitis panel, HIV, AMA, KINZA, celiac screen, HSV IgG, EBV IgG, CMV IgG, alpha-1 antitrypsin were negative.  MRCP on 6/17 did not show any cholestasis.  Right upper quadrant ultrasound with elastography did not show cirrhosis. She does not have hemochromatosis, genetic analysis in 2019 showed she is only heterozygous for H63D mutation and negative for mutations C282Y and S65C Initially there was concern of ischemic hepatitis, however later it was attributed to drug induced injury in setting of multiple  medication use.  Gastroenterology recommended against liver biopsy as it would not change the management.  Patient's liver function slowly improved, and is returning to baseline.    #Disseminated intravascular coagulation: She presented with thrombocytopenia and anemia with low fibrinogen.  No active infection or malignancy could be identified as an underlying source.  Peripheral smear did not show any schistocytes.  She received platelet and other blood product transfusions.  Her thrombocytopenia resolved and fibrinogen level came back to normal.    #Anasarca: This was likely multifactorial with components of adrenal insufficiency and liver disease.  She was diuresed with Bumex and return to her dry weight.    #Pyelonephritis: On 7/28 patient developed right flank pain and dysuria along with CVA tenderness.  She was afebrile and hemodynamically stable.  Her urinalysis showed UTI.  Renal CT did not show any hydronephrosis or other pathology.  Her urine culture is growing lactose fermenting gram-negative rods.  Given her extensive allergic history, infectious disease Dr. Zarco was consulted and recommended fosfomycin 2 doses 48 hours apart. She has received 1 dose of her fosfomycin, and should receive the next dose tomorrow at skilled nursing facility.         Therefore, she is discharged in guarded and stable condition to skilled nursing facility.    The patient met 2-midnight criteria for an inpatient stay at the time of discharge.    Discharge Date  7/30/2021    FOLLOW UP ITEMS POST DISCHARGE  -With primary care provider within 1 week for post hospital follow-up  -With primary care provider for assessment of resolution of pyelonephritis    DISCHARGE DIAGNOSES  Principal Problem:    Pyelonephritis POA: No  Active Problems:    Hypokalemia POA: Yes    Hypothyroidism POA: Yes    Adrenal insufficiency (Valentín's disease) (HCC) POA: Yes    History of pulmonary embolus (PE) POA: Yes    Anasarca POA: Yes    Elevated  liver enzymes POA: Yes  Resolved Problems:    Acute kidney injury (HCC) POA: Yes    Recurrent syncope POA: Yes    Thrombocytopenia (HCC) POA: Yes    Lower GI bleed POA: Yes    Severe protein-calorie malnutrition (HCC) POA: Yes    Diarrhea POA: No      FOLLOW UP  Future Appointments   Date Time Provider Department Center   9/13/2021  8:15 AM [facility] EXAM 12 ECHO [facility] [address]   9/20/2021  8:00 AM Eligio Torres M.D. [facility] None   9/21/2021 11:20 AM Michael J Bloch, M.D. [facility] None     No follow-up provider specified.    MEDICATIONS ON DISCHARGE     Medication List      ASK your doctor about these medications      Instructions   Acetaminophen 8 Hour 650 MG CR tablet  Generic drug: acetaminophen   Take 650 mg by mouth every four hours as needed.  Dose: 650 mg     amitriptyline 10 MG Tabs  Commonly known as: ELAVIL  Ask about: Which instructions should I use?   Take 10 mg by mouth every day.  Dose: 10 mg     calcitonin (salmon) 200 UNIT/ACT Soln  Commonly known as: MIACALCIN   Spray 1 Spray in nose every bedtime.  Dose: 1 Spray     colesevelam 625 MG Tabs  Commonly known as: WELCHOL   Take 625 mg by mouth in the morning, at noon, and at bedtime.  Dose: 625 mg     D3 2000 UNIT Tabs  Generic drug: Cholecalciferol   Take 2,000 Units by mouth every day.  Dose: 2,000 Units     DULoxetine 60 MG Cpep delayed-release capsule  Commonly known as: CYMBALTA   Take 60 mg by mouth every bedtime.  Dose: 60 mg     Eliquis 5mg Tabs  Generic drug: apixaban   Take 5 mg by mouth 2 times a day.  Dose: 5 mg     fludrocortisone 0.1 MG Tabs  Commonly known as: FLORINEF  Ask about: Which instructions should I use?   Take 0.1 mg by mouth 2 times a day.  Dose: 0.1 mg     furosemide 40 MG Tabs  Commonly known as: LASIX  Ask about: Which instructions should I use?   Take 40 mg by mouth every day.  Dose: 40 mg     gabapentin 400 MG Caps  Commonly known as: NEURONTIN  Ask about: Which instructions should I use?   Take 400 mg by mouth 2 times a  day.  Dose: 400 mg     hydrocortisone 10 MG Tabs  Commonly known as: CORTEF  Ask about: Which instructions should I use?   Take 10-20 mg by mouth 2 times a day. 20 mg in the AM  10 mg in the Afternoon  Dose: 10-20 mg     levothyroxine 75 MCG Tabs  Commonly known as: SYNTHROID   Take 75 mcg by mouth every morning on an empty stomach.  Dose: 75 mcg     lidocaine 5 % Ptch  Commonly known as: LIDODERM   Place 1 Patch on the skin every 12 hours.  Dose: 1 Patch     liothyronine 25 MCG Tabs  Commonly known as: CYTOMEL   Take 25 mcg by mouth every bedtime.  Dose: 25 mcg     magnesium chloride 64 MG Tbec  Commonly known as: MAG-64   Take 64 mg by mouth every day.  Dose: 64 mg     midodrine 5 MG Tabs  Commonly known as: PROAMATINE   Take 5 mg by mouth 3 times a day with meals.  Dose: 5 mg     montelukast 10 MG Tabs  Commonly known as: SINGULAIR   Take 10 mg by mouth every evening.  Dose: 10 mg     potassium chloride SA 20 MEQ Tbcr  Commonly known as: Kdur   Take 40 mEq by mouth every day.  Dose: 40 mEq     SUMAtriptan 50 MG Tabs  Commonly known as: IMITREX   Take 50 mg by mouth every 2 hours as needed for Migraine. Max daily dose is 200 mg in a 24 hour period  Dose: 50 mg     Xerava 100 MG Solr injection  Generic drug: Eravacycline Dihydrochloride   Infuse 100 mg into a venous catheter 2 times a day.  Dose: 100 mg     Zomacton 10 MG Solr  Generic drug: Somatropin   Inject 0.3 mg under the skin every evening.  Dose: 0.3 mg            Allergies  Allergies   Allergen Reactions   • Ancef [Cefazolin] Hives and Shortness of Breath   • Bactrim [Sulfamethoxazole W-Trimethoprim] Shortness of Breath   • Bee Venom Anaphylaxis   • Buprenorphine Anaphylaxis     Plus hives and shortness of breath   • Clindamycin Hives and Shortness of Breath   • Contrast Media With Iodine [Iodine] Hives and Swelling   • Doxycycline Hives and Shortness of Breath   • Econazole Anaphylaxis   • Flagyl  [Metronidazole] Hives and Unspecified   • Floxin  "[Ofloxacin] Anaphylaxis, Shortness of Breath and Swelling     Cause throat swelling and difficulty breathing   • Gadolinium Derivatives Hives and Swelling     Throat swelling   • Hydrocodone-Acetaminophen Hives and Shortness of Breath   • Iodine Shortness of Breath and Anaphylaxis     Throat and tongue swelling with IV contrast   • Keflex Shortness of Breath     And hives   • Levofloxacin Shortness of Breath     And anaphylaxis reported   • Morphine Anaphylaxis   • Naloxone Hives     \"hives, SOB\"   • Nitrofurantoin Shortness of Breath     ...and hives     • Norco [Apap-Fd&C Yellow #10 Al Tai-Hydrocodone] Shortness of Breath     ...and hives     • Nyquil Hives and Shortness of Breath   • Oxycodone Shortness of Breath     ...and hives     • Paricalcitol Hives, Shortness of Breath and Unspecified     CHEST PAIN   • Penicillins Shortness of Breath     ...and hives     • Tape Contact Dermatitis and Swelling     Blisters, clear tegaderm ok.. No steristrips.  Other reaction(s): Other, Unknown   • Tramadol Hives   • Vicks Dayquil Cold Hives and Shortness of Breath   • Azithromycin Hives and Shortness of Breath     Pt had Hives also   • Bextra [Valdecoxib] Rash     \"Skin burn and peeling.\"   • Linezolid Rash     Rash all over body   • Codeine Shortness of Breath and Rash     Rash & SOB   • Sulfa Drugs      Other reaction(s): Hives   • Tygacil [Tigecycline]      itching       DIET  Orders Placed This Encounter   Procedures   • Diet Order Diet: Cardiac     Standing Status:   Standing     Number of Occurrences:   1     Order Specific Question:   Diet:     Answer:   Cardiac [6]       ACTIVITY  As tolerated and directed by skilled nursing.  Weight bearing as tolerated    CONSULTATIONS  Dr. Tran with Critical Care consulted.  Treatment options were discussed and plan of care agreed upon., Dr. Medrano with Gastroenterology consulted.  Treatment options were discussed and plan of care agreed upon., Dr. Smith with General " Surgery Service consulted.  Treatment options were discussed and plan of care agreed upon. and Dr. Gastelum with hematology and Dr. Marcus with infectious disease were consulted.    PROCEDURES  EEG 7/12/2021  Central line 7/11/2021    Physical Exam  HENT:      Head: Normocephalic and atraumatic.      Nose: Nose normal.   Eyes:      Extraocular Movements: Extraocular movements intact.      Pupils: Pupils are equal, round, and reactive to light.   Cardiovascular:      Rate and Rhythm: Normal rate and regular rhythm.      Pulses: Normal pulses.      Heart sounds: Normal heart sounds.   Pulmonary:      Effort: Pulmonary effort is normal.      Breath sounds: Normal breath sounds.   Abdominal:      General: Bowel sounds are normal.      Palpations: Abdomen is soft.      Tenderness: There is no abdominal tenderness. There is right CVA tenderness.   Musculoskeletal:      Cervical back: Normal range of motion and neck supple.      Right lower leg: Edema (Trace) present.      Left lower leg: Edema (Trace) present.   Skin:     General: Skin is warm.      Capillary Refill: Capillary refill takes less than 2 seconds.   Neurological:      General: No focal deficit present.      Mental Status: She is alert and oriented to person, place, and time.   Psychiatric:         Mood and Affect: Mood normal.         Behavior: Behavior normal.         Thought Content: Thought content does not include homicidal or suicidal ideation.       Time spent on discharge: 76 minutes    Please note that this dictation was created using voice recognition software. I have made every reasonable attempt to correct obvious errors, but there may be errors of grammar and possibly content that I did not discover before finalizing the note.

## 2021-07-30 NOTE — DISCHARGE INSTRUCTIONS
Discharge Instructions    Discharged to other by medical transportation with escort. Discharged via ambulance, hospital escort: Yes.  Special equipment needed: Not Applicable    Be sure to schedule a follow-up appointment with your primary care doctor or any specialists as instructed.     Discharge Plan:   Diet Plan: Discussed  Activity Level: Discussed  Confirmed Follow up Appointment: Appointment Scheduled  Confirmed Symptoms Management: Discussed  Medication Reconciliation Updated: Yes    I understand that a diet low in cholesterol, fat, and sodium is recommended for good health. Unless I have been given specific instructions below for another diet, I accept this instruction as my diet prescription.   Other diet: Regular    Special Instructions: None    · Is patient discharged on Warfarin / Coumadin?   No     Depression / Suicide Risk    As you are discharged from this [facility] facility, it is important to learn how to keep safe from harming yourself.    Recognize the warning signs:  · Abrupt changes in personality, positive or negative- including increase in energy   · Giving away possessions  · Change in eating patterns- significant weight changes-  positive or negative  · Change in sleeping patterns- unable to sleep or sleeping all the time   · Unwillingness or inability to communicate  · Depression  · Unusual sadness, discouragement and loneliness  · Talk of wanting to die  · Neglect of personal appearance   · Rebelliousness- reckless behavior  · Withdrawal from people/activities they love  · Confusion- inability to concentrate     If you or a loved one observes any of these behaviors or has concerns about self-harm, here's what you can do:  · Talk about it- your feelings and reasons for harming yourself  · Remove any means that you might use to hurt yourself (examples: pills, rope, extension cords, firearm)  · Get professional help from the community (Mental Health, Substance Abuse, psychological  counseling)  · Do not be alone:Call your Safe Contact- someone whom you trust who will be there for you.  · Call your local CRISIS HOTLINE 335-2321 or 592-298-9436  · Call your local Children's Mobile Crisis Response Team [place] (028) 079-4586 or www.[employer]  · Call the toll free National Suicide Prevention Hotlines   · National Suicide Prevention Lifeline 303-066-ABEP (7211)  · [employer] Hope Line Network 800-SUICIDE (758-9583)      Gastrointestinal Bleeding  Gastrointestinal (GI) bleeding is bleeding somewhere along the path that food travels through the body (digestive tract). This path is anywhere between the mouth and the opening of the butt (anus). You may have blood in your poop (stool) or have black poop. If you throw up (vomit), there may be blood in it.  This condition can be mild, serious, or even life-threatening. If you have a lot of bleeding, you may need to stay in the hospital.  What are the causes?  This condition may be caused by:  · Irritation and swelling of the esophagus (esophagitis). The esophagus is part of the body that moves food from your mouth to your stomach.  · Swollen veins in the butt (hemorrhoids).  · Areas of painful tearing in the opening of the butt (anal fissures). These are often caused by passing hard poop.  · Pouches that form on the colon over time (diverticulosis).  · Irritation and swelling (diverticulitis) in areas where pouches have formed on the colon.  · Growths (polyps) or cancer. Colon cancer often starts out as growths that are not cancer.  · Irritation of the stomach lining (gastritis).  · Sores (ulcers) in the stomach.  What increases the risk?  You are more likely to develop this condition if you:  · Have a certain type of infection in your stomach (Helicobacter pylori infection).  · Take certain medicines.  · Smoke.  · Drink alcohol.  What are the signs or symptoms?  Common symptoms of this condition include:  · Throwing up (vomiting) material that  has bright red blood in it. It may look like coffee grounds.  · Changes in your poop. The poop may:  ? Have red blood in it.  ? Be black, look like tar, and smell stronger than normal.  ? Be red.  · Pain or cramping in the belly (abdomen).  How is this treated?  Treatment for this condition depends on the cause of the bleeding. For example:  · Sometimes, the bleeding can be stopped during a procedure that is done to find the problem (endoscopy or colonoscopy).  · Medicines can be used to:  ? Help control irritation, swelling, or infection.  ? Reduce acid in your stomach.  · Certain problems can be treated with:  ? Creams.  ? Medicines that are put in the butt (suppositories).  ? Warm baths.  · Surgery is sometimes needed.  · If you lose a lot of blood, you may need a blood transfusion.  If bleeding is mild, you may be allowed to go home. If there is a lot of bleeding, you will need to stay in the hospital.  Follow these instructions at home:    · Take over-the-counter and prescription medicines only as told by your doctor.  · Eat foods that have a lot of fiber in them. These foods include beans, whole grains, and fresh fruits and vegetables. You can also try eating 1-3 prunes each day.  · Drink enough fluid to keep your pee (urine) pale yellow.  · Keep all follow-up visits as told by your doctor. This is important.  Contact a doctor if:  · Your symptoms do not get better.  Get help right away if:  · Your bleeding does not stop.  · You feel dizzy or you pass out (faint).  · You feel weak.  · You have very bad cramps in your back or belly.  · You pass large clumps of blood (clots) in your poop.  · Your symptoms are getting worse.  · You have chest pain or fast heartbeats.  Summary  · GI bleeding is bleeding somewhere along the path that food travels through the body (digestive tract).  · This bleeding can be caused by many things. Treatment depends on the cause of the bleeding.  · Take medicines only as told by your  doctor.  · Keep all follow-up visits as told by your doctor. This is important.  This information is not intended to replace advice given to you by your health care provider. Make sure you discuss any questions you have with your health care provider.  Document Released: 09/26/2009 Document Revised: 07/31/2019 Document Reviewed: 07/31/2019  [employer] Patient Education © 2020 [employer]        Syncope  Syncope is when you pass out (faint) for a short time. It is caused by a sudden decrease in blood flow to the brain. Signs that you may be about to pass out include:  · Feeling dizzy or light-headed.  · Feeling sick to your stomach (nauseous).  · Seeing all white or all black.  · Having cold, clammy skin.  If you pass out, get help right away. Call your local emergency services (911 in the [place] Do not drive yourself to the hospital.  Follow these instructions at home:  Watch for any changes in your symptoms. Take these actions to stay safe and help with your symptoms:  Lifestyle  · Do not drive, use machinery, or play sports until your doctor says it is okay.  · Do not drink alcohol.  · Do not use any products that contain nicotine or tobacco, such as cigarettes and e-cigarettes. If you need help quitting, ask your doctor.  · Drink enough fluid to keep your pee (urine) pale yellow.  General instructions  · Take over-the-counter and prescription medicines only as told by your doctor.  · If you are taking blood pressure or heart medicine, sit up and stand up slowly. Spend a few minutes getting ready to sit and then stand. This can help you feel less dizzy.  · Have someone stay with you until you feel stable.  · If you start to feel like you might pass out, lie down right away and raise (elevate) your feet above the level of your heart. Breathe deeply and steadily. Wait until all of the symptoms are gone.  · Keep all follow-up visits as told by your doctor. This is important.  Get help right away if:  · You have a  very bad headache.  · You pass out once or more than once.  · You have pain in your chest, belly, or back.  · You have a very fast or uneven heartbeat (palpitations).  · It hurts to breathe.  · You are bleeding from your mouth or your bottom (rectum).  · You have black or tarry poop (stool).  · You have jerky movements that you cannot control (seizure).  · You are confused.  · You have trouble walking.  · You are very weak.  · You have vision problems.  These symptoms may be an emergency. Do not wait to see if the symptoms will go away. Get medical help right away. Call your local emergency services (911 in the [place] Do not drive yourself to the hospital.  Summary  · Syncope is when you pass out (faint) for a short time. It is caused by a sudden decrease in blood flow to the brain.  · Signs that you may be about to faint include feeling dizzy, light-headed, or sick to your stomach, seeing all white or all black, or having cold, clammy skin.  · If you start to feel like you might pass out, lie down right away and raise (elevate) your feet above the level of your heart. Breathe deeply and steadily. Wait until all of the symptoms are gone.  This information is not intended to replace advice given to you by your health care provider. Make sure you discuss any questions you have with your health care provider.  Document Released: 06/05/2009 Document Revised: 01/30/2019 Document Reviewed: 01/30/2019  [employer] Patient Education © 2020 [employer]      [place]'s Disease    [place]'s disease, which is also called primary adrenal insufficiency, is a condition in which the adrenal glands do not make enough of the hormones cortisol and aldosterone.  The disease causes blood pressure to drop and causes potassium to build up to dangerous levels. If Valentín's disease is not treated, it can suddenly get worse and become life-threatening. This is called an adrenal crisis (Addisonian crisis).  What are the causes?  This condition  may be caused by:  · A disease in which the body's immune system damages the adrenal glands.  · An infection of the adrenal glands.  · Bleeding (hemorrhage) in the adrenal glands.  · A tumor.  What are the signs or symptoms?  Common symptoms of this condition include:  · Severe fatigue.  · Muscle weakness.  · Loss of appetite.  · Weight loss.  · Darkening of the skin.  · Low blood pressure (hypotension).  Other symptoms include:  · Nausea or vomiting.  · Diarrhea.  · Dizziness or fainting.  · Irritability  · Depression.  · Salt cravings.  · Low blood sugar (hypoglycemia).  · Irregular or no menstrual periods.  Symptoms usually develop slowly and get worse gradually.  How is this diagnosed?  This condition may be diagnosed based on your:  · Medical history.  · Symptoms.  · Lab test results. Lab tests include a measurement of your blood cortisol levels.  · Imaging tests results. You may have a CT scan of the adrenal glands.  How is this treated?  This condition cannot be cured, but it can be managed with medicines that replace cortisol and aldosterone. You may need to take these medicines:  · Several times a day, by mouth.  · Through an injection if you become so sick that you are unable to take these medicines by mouth or you are unable to keep them down.  Illness, stress, and surgery can increase your body's need for cortisol. It is very important that you talk with your health care provider and understand how to adjust your medicine dosages if you become ill, stressed, or if you are going to have surgery.  Follow these instructions at home:  · Keep all follow-up visits as told by your health care provider. This is important.  Medicines  · Know how to increase your medicine dosage during periods of stress, mild illness, or surgery.  · Take over-the-counter and prescription medicines only as told by your health care provider.  General instructions    · When you travel, carry a needle, a syringe, and an injectable  form of cortisol in case of an emergency.  · In case of an emergency, wear a medical alert bracelet or neck chain so that others understand that you have [place]'s disease.  · Carry an ID card that states that you have Valentín's disease. The card should include:  ? Instructions to inject a certain amount of medicine if you are severely hurt or cannot respond.  ? The name and phone number of your health care provider.  ? The name and phone number of your closest relative.  Contact a health care provider if:  · You get sick with another illness.  · You develop new symptoms.  Get help right away if:  · You have a severe infection or other illness.  · You have severe vomiting or diarrhea.  · You find it necessary to give yourself injectable medicine.  · You have symptoms of an Addisoniancrisis. These symptoms include:  ? Sudden, severe pain in the lower back, abdomen, or legs.  ? Severe vomiting and diarrhea.  ? Dehydration.  ? Low blood pressure.  ? Loss of consciousness.  Summary  · Valentín's disease is the inability of the adrenal glands to make hormones that regulate everyday functions of the body.  · If untreated, this condition can lead to life-threatening problems.  · It is very important that you talk with your health care provider and understand how to adjust your medicine dosages if you become ill, stressed, or if you are going to have surgery.  · Take over-the-counter and prescription medicines only as told by your health care provider.  · Keep all follow-up visits as told by your health care provider. This is important.  This information is not intended to replace advice given to you by your health care provider. Make sure you discuss any questions you have with your health care provider.  Document Released: 12/18/2006 Document Revised: 09/12/2019 Document Reviewed: 09/12/2019  [employer] Patient Education © 2020 [employer].      Peripheral Edema    Peripheral edema is swelling that is caused by a buildup of  fluid. Peripheral edema most often affects the lower legs, ankles, and feet. It can also develop in the arms, hands, and face. The area of the body that has peripheral edema will look swollen. It may also feel heavy or warm. Your clothes may start to feel tight. Pressing on the area may make a temporary dent in your skin. You may not be able to move your swollen arm or leg as much as usual.  There are many causes of peripheral edema. It can happen because of a complication of other conditions such as congestive heart failure, kidney disease, or a problem with your blood circulation. It also can be a side effect of certain medicines or because of an infection. It often happens to women during pregnancy. Sometimes, the cause is not known.  Follow these instructions at home:  Managing pain, stiffness, and swelling    · Raise (elevate) your legs while you are sitting or lying down.  · Move around often to prevent stiffness and to lessen swelling.  · Do not sit or stand for long periods of time.  · Wear support stockings as told by your health care provider.  Medicines  · Take over-the-counter and prescription medicines only as told by your health care provider.  · Your health care provider may prescribe medicine to help your body get rid of excess water (diuretic).  General instructions  · Pay attention to any changes in your symptoms.  · Follow instructions from your health care provider about limiting salt (sodium) in your diet. Sometimes, eating less salt may reduce swelling.  · Moisturize skin daily to help prevent skin from cracking and draining.  · Keep all follow-up visits as told by your health care provider. This is important.  Contact a health care provider if you have:  · A fever.  · Edema that starts suddenly or is getting worse, especially if you are pregnant or have a medical condition.  · Swelling in only one leg.  · Increased swelling, redness, or pain in one or both of your legs.  · Drainage or sores at  the area where you have edema.  Get help right away if you:  · Develop shortness of breath, especially when you are lying down.  · Have pain in your chest or abdomen.  · Feel weak.  · Feel faint.  Summary  · Peripheral edema is swelling that is caused by a buildup of fluid. Peripheral edema most often affects the lower legs, ankles, and feet.  · Move around often to prevent stiffness and to lessen swelling. Do not sit or stand for long periods of time.  · Pay attention to any changes in your symptoms.  · Contact a health care provider if you have edema that starts suddenly or is getting worse, especially if you are pregnant or have a medical condition.  · Get help right away if you develop shortness of breath, especially when lying down.  This information is not intended to replace advice given to you by your health care provider. Make sure you discuss any questions you have with your health care provider.  Document Released: 01/25/2006 Document Revised: 09/11/2019 Document Reviewed: 09/11/2019  [employer] Patient Education © 2020 [employer]      Thrombocytopenia  Thrombocytopenia means that you have a low number of platelets in your blood. Platelets are tiny cells in the blood. When you bleed, they clump together at the cut or injury to stop the bleeding. This is called blood clotting. If you do not have enough platelets, it can cause bleeding problems. Some cases of this condition are mild while others are more severe.  What are the causes?  This condition may be caused by:  · Your body not making enough platelets. This may be caused by:  ? Your bone marrow not making blood cells (aplastic anemia).  ? Cancer in the bone marrow.  ? Certain medicines.  ? Infection in the bone marrow.  ? Drinking a lot of alcohol.  · Your body destroying platelets too quickly. This may be caused by:  ? Certain immune diseases.  ? Certain medicines.  ? Certain blood clotting disorders.  ? Certain disorders that are passed from parent  to child (inherited).  ? Certain bleeding disorders.  ? Pregnancy.  ? Having a spleen that is larger than normal.  What are the signs or symptoms?  · Bleeding that is not normal.  · Nosebleeds.  · Heavy menstrual periods.  · Blood in the pee (urine) or poop (stool).  · A purple-like color to the skin (purpura).  · Bruising.  · A rash that looks like pinpoint, purple-red spots (petechiae).  How is this treated?  · Treatment of another condition that is causing the low platelet count.  · Medicines to help protect your platelets from being destroyed.  · A replacement (transfusion) of platelets to stop or prevent bleeding.  · Surgery to remove the spleen.  Follow these instructions at home:  Activity  · Avoid activities that could cause you to get hurt or bruised. Follow instructions about how to prevent falls.  · Take care not to cut yourself:  ? When you shave.  ? When you use scissors, needles, knives, or other tools.  · Take care not to burn yourself:  ? When you use an iron.  ? When you cook.  General instructions    · Check your skin and the inside of your mouth for bruises or blood as told by your doctor.  · Check to see if there is blood in your spit (sputum), pee, and poop. Do this as told by your doctor.  · Do not drink alcohol.  · Take over-the-counter and prescription medicines only as told by your doctor.  · Do not take any medicines that have aspirin or NSAIDs in them. These medicines can thin your blood and cause you to bleed.  · Tell all of your doctors that you have this condition. Be sure to tell your dentist and eye doctor too.  Contact a doctor if:  · You have bruises and you do not know why.  Get help right away if:  · You are bleeding anywhere on your body.  · You have blood in your spit, pee, or poop.  Summary  · Thrombocytopenia means that you have a low number of platelets in your blood.  · Platelets are needed for blood clotting.  · Symptoms of this condition include bleeding that is not  normal, and bruising.  · Take care not to cut or burn yourself.  This information is not intended to replace advice given to you by your health care provider. Make sure you discuss any questions you have with your health care provider.  Document Released: 12/06/2012 Document Revised: 09/19/2019 Document Reviewed: 09/19/2019  Elsevier Patient Education © 2020 [employer] Inc.

## 2021-07-30 NOTE — CARE PLAN
The patient is Stable - Low risk of patient condition declining or worsening    Shift Goals  Clinical Goals: manage pain   Patient Goals: up to chair    Progress made toward(s) clinical / shift goals:    Problem: Knowledge Deficit - Standard  Goal: Patient and family/care givers will demonstrate understanding of plan of care, disease process/condition, diagnostic tests and medications  Outcome: Progressing  Note: Pt educated regarding plan of care and medications. All questions answered.         Patient is not progressing towards the following goals:

## 2021-07-30 NOTE — PROGRESS NOTES
Assumed care at 1915. Bedside report received from gage RN . Patient's chart and MAR reviewed. 12 hour chart check complete. Assessment complete, pt 6/10 pain at this time, ABD. Pt is awake in bed. Pt is A & O x 4. Patient was updated on plan of care for the day. Questions answered and concerns addressed.  Pt denies any additional needs at this time. White board updated. Call light, phone and personal belongings within reach. Bed alarm on and working appropriately. Vital signs stable.

## 2021-07-30 NOTE — PROGRESS NOTES
Eyes Skin Assessment Completed by AILYN Goldman and AILYN Okeefe.        Head Jaundice  Ears Blanching  Nose Blanching  Neck Blanching, Bruising and Jaundice  Breast/Chest Bruising, Jaundice and Edema  Shoulder Blades Blanching, bruising   Spine Blanching, bruising  (R) Arm/Elbow/Hand Blanching, Bruising, Abrasion, Swelling, Weeping and Edema  (L) Arm/Elbow/Hand Blanching, Bruising, Abrasion, Scab, Weeping and Edema  Abdomen Blanching, Scab and Bruising  Groin Redness, Blanching, Excoriation, Rash and Swelling  Scrotum/Coccyx/Buttocks Redness, reyna in place, barrier cream used. Excoriation and Moisture Fissure  (R) Leg Blanching, Bruising, Swelling, Edema  (L) Leg Blanching, Bruising, Swelling, Edema  (R) Heel/Foot/Toe Redness, Blanching, Boggy, Swelling and Edema  (L) Heel/Foot/Toe Redness, Blanching, Boggy, Bruising, Swelling, Scab and Edema              Devices In Places Tele Box, Central Line        Interventions In Place Heel Mepilex, Waffle Overlay, Pillows, Q2 Turns, frequent linen changes to ensure pt stays dry, Barrier Cream, Dri-Jack Pads, Heels Loaded W/Pillows and Pressure Redistribution Mattress

## 2021-07-30 NOTE — DISCHARGE PLANNING
Received Transport Form @ 9876  Spoke to Susi @ [facility]    Transport is scheduled for 7/30 @0262 going to [facility].    RNCM notified  Left vm for Danika @ [facility] notifying her of transport time

## 2021-07-30 NOTE — DISCHARGE PLANNING
Anticipated Discharge Disposition: [facility]    Action: Spoke to pt at bedside, pt gave verbal auth for transfer to [facility] today by [facility] at 1615. Transport forms faxed to [facility]. Transfer packet completed and placed in chart, AILYN Martínez notified by [employer]. 2nd IMM given    Barriers to Discharge: none    Plan: transfer to [facility] at 1615

## 2021-08-01 PROBLEM — K71.9 DRUG-INDUCED LIVER INJURY: Status: ACTIVE | Noted: 2021-08-01

## 2021-08-09 PROBLEM — M25.571 ACUTE RIGHT ANKLE PAIN: Status: ACTIVE | Noted: 2021-08-09

## 2021-08-12 PROBLEM — D64.9 ANEMIA: Status: ACTIVE | Noted: 2021-08-12

## 2021-08-13 ENCOUNTER — APPOINTMENT (OUTPATIENT)
Dept: RADIOLOGY | Facility: MEDICAL CENTER | Age: 56
End: 2021-08-13
Attending: EMERGENCY MEDICINE
Payer: MEDICARE

## 2021-08-13 ENCOUNTER — HOSPITAL ENCOUNTER (EMERGENCY)
Facility: MEDICAL CENTER | Age: 56
End: 2021-08-13
Attending: EMERGENCY MEDICINE
Payer: MEDICARE

## 2021-08-13 VITALS
OXYGEN SATURATION: 95 % | WEIGHT: 220 LBS | SYSTOLIC BLOOD PRESSURE: 227 MMHG | TEMPERATURE: 98.3 F | DIASTOLIC BLOOD PRESSURE: 116 MMHG | BODY MASS INDEX: 37.76 KG/M2 | HEART RATE: 91 BPM | RESPIRATION RATE: 20 BRPM

## 2021-08-13 DIAGNOSIS — I10 ESSENTIAL HYPERTENSION: ICD-10-CM

## 2021-08-13 DIAGNOSIS — N39.0 ACUTE UTI: ICD-10-CM

## 2021-08-13 PROBLEM — M54.9 BACK PAIN: Status: ACTIVE | Noted: 2021-08-13

## 2021-08-13 LAB
ALBUMIN SERPL BCP-MCNC: 3.3 G/DL (ref 3.2–4.9)
ALBUMIN/GLOB SERPL: 1.2 G/DL
ALP SERPL-CCNC: 126 U/L (ref 30–99)
ALT SERPL-CCNC: 37 U/L (ref 2–50)
ANION GAP SERPL CALC-SCNC: 14 MMOL/L (ref 7–16)
APPEARANCE UR: CLEAR
AST SERPL-CCNC: 46 U/L (ref 12–45)
BACTERIA #/AREA URNS HPF: ABNORMAL /HPF
BASOPHILS # BLD AUTO: 0.6 % (ref 0–1.8)
BASOPHILS # BLD: 0.03 K/UL (ref 0–0.12)
BILIRUB SERPL-MCNC: 1.5 MG/DL (ref 0.1–1.5)
BILIRUB UR QL STRIP.AUTO: NEGATIVE
BUN SERPL-MCNC: 3 MG/DL (ref 8–22)
CALCIUM SERPL-MCNC: 8.9 MG/DL (ref 8.5–10.5)
CHLORIDE SERPL-SCNC: 107 MMOL/L (ref 96–112)
CO2 SERPL-SCNC: 23 MMOL/L (ref 20–33)
COLOR UR: YELLOW
CREAT SERPL-MCNC: 0.59 MG/DL (ref 0.5–1.4)
EOSINOPHIL # BLD AUTO: 0.15 K/UL (ref 0–0.51)
EOSINOPHIL NFR BLD: 2.9 % (ref 0–6.9)
EPI CELLS #/AREA URNS HPF: ABNORMAL /HPF
ERYTHROCYTE [DISTWIDTH] IN BLOOD BY AUTOMATED COUNT: 66.6 FL (ref 35.9–50)
GLOBULIN SER CALC-MCNC: 2.8 G/DL (ref 1.9–3.5)
GLUCOSE SERPL-MCNC: 89 MG/DL (ref 65–99)
GLUCOSE UR STRIP.AUTO-MCNC: NEGATIVE MG/DL
HCT VFR BLD AUTO: 34.1 % (ref 37–47)
HGB BLD-MCNC: 9.9 G/DL (ref 12–16)
IMM GRANULOCYTES # BLD AUTO: 0.02 K/UL (ref 0–0.11)
IMM GRANULOCYTES NFR BLD AUTO: 0.4 % (ref 0–0.9)
KETONES UR STRIP.AUTO-MCNC: NEGATIVE MG/DL
LACTATE BLD-SCNC: 1.9 MMOL/L (ref 0.5–2)
LACTATE BLD-SCNC: 3.8 MMOL/L (ref 0.5–2)
LEUKOCYTE ESTERASE UR QL STRIP.AUTO: ABNORMAL
LYMPHOCYTES # BLD AUTO: 1.86 K/UL (ref 1–4.8)
LYMPHOCYTES NFR BLD: 35.6 % (ref 22–41)
MCH RBC QN AUTO: 28 PG (ref 27–33)
MCHC RBC AUTO-ENTMCNC: 29 G/DL (ref 33.6–35)
MCV RBC AUTO: 96.6 FL (ref 81.4–97.8)
MICRO URNS: ABNORMAL
MONOCYTES # BLD AUTO: 0.7 K/UL (ref 0–0.85)
MONOCYTES NFR BLD AUTO: 13.4 % (ref 0–13.4)
NEUTROPHILS # BLD AUTO: 2.46 K/UL (ref 2–7.15)
NEUTROPHILS NFR BLD: 47.1 % (ref 44–72)
NITRITE UR QL STRIP.AUTO: POSITIVE
NRBC # BLD AUTO: 0 K/UL
NRBC BLD-RTO: 0 /100 WBC
PH UR STRIP.AUTO: 5.5 [PH] (ref 5–8)
PLATELET # BLD AUTO: 478 K/UL (ref 164–446)
PMV BLD AUTO: 9.7 FL (ref 9–12.9)
POTASSIUM SERPL-SCNC: 3.4 MMOL/L (ref 3.6–5.5)
PROT SERPL-MCNC: 6.1 G/DL (ref 6–8.2)
PROT UR QL STRIP: NEGATIVE MG/DL
RBC # BLD AUTO: 3.53 M/UL (ref 4.2–5.4)
RBC # URNS HPF: ABNORMAL /HPF
RBC UR QL AUTO: NEGATIVE
SODIUM SERPL-SCNC: 144 MMOL/L (ref 135–145)
SP GR UR STRIP.AUTO: 1.02
UROBILINOGEN UR STRIP.AUTO-MCNC: 0.2 MG/DL
WBC # BLD AUTO: 5.2 K/UL (ref 4.8–10.8)
WBC #/AREA URNS HPF: ABNORMAL /HPF

## 2021-08-13 PROCEDURE — 85025 COMPLETE CBC W/AUTO DIFF WBC: CPT

## 2021-08-13 PROCEDURE — 96365 THER/PROPH/DIAG IV INF INIT: CPT

## 2021-08-13 PROCEDURE — 700111 HCHG RX REV CODE 636 W/ 250 OVERRIDE (IP): Performed by: EMERGENCY MEDICINE

## 2021-08-13 PROCEDURE — 700102 HCHG RX REV CODE 250 W/ 637 OVERRIDE(OP): Performed by: EMERGENCY MEDICINE

## 2021-08-13 PROCEDURE — 87077 CULTURE AEROBIC IDENTIFY: CPT

## 2021-08-13 PROCEDURE — 80053 COMPREHEN METABOLIC PANEL: CPT

## 2021-08-13 PROCEDURE — A9270 NON-COVERED ITEM OR SERVICE: HCPCS | Performed by: EMERGENCY MEDICINE

## 2021-08-13 PROCEDURE — 81001 URINALYSIS AUTO W/SCOPE: CPT

## 2021-08-13 PROCEDURE — 74176 CT ABD & PELVIS W/O CONTRAST: CPT

## 2021-08-13 PROCEDURE — 87186 SC STD MICRODIL/AGAR DIL: CPT

## 2021-08-13 PROCEDURE — 96375 TX/PRO/DX INJ NEW DRUG ADDON: CPT

## 2021-08-13 PROCEDURE — 73610 X-RAY EXAM OF ANKLE: CPT | Mod: LT

## 2021-08-13 PROCEDURE — 83605 ASSAY OF LACTIC ACID: CPT | Mod: 91

## 2021-08-13 PROCEDURE — 87040 BLOOD CULTURE FOR BACTERIA: CPT | Mod: 91

## 2021-08-13 PROCEDURE — 700105 HCHG RX REV CODE 258: Performed by: EMERGENCY MEDICINE

## 2021-08-13 PROCEDURE — 99285 EMERGENCY DEPT VISIT HI MDM: CPT

## 2021-08-13 PROCEDURE — 700101 HCHG RX REV CODE 250: Performed by: EMERGENCY MEDICINE

## 2021-08-13 PROCEDURE — 87086 URINE CULTURE/COLONY COUNT: CPT

## 2021-08-13 PROCEDURE — 73610 X-RAY EXAM OF ANKLE: CPT | Mod: RT

## 2021-08-13 RX ORDER — GENTAMICIN SULFATE 40 MG/ML
180 INJECTION, SOLUTION INTRAMUSCULAR; INTRAVENOUS DAILY
Qty: 40 ML | Refills: 0 | Status: SHIPPED | OUTPATIENT
Start: 2021-08-13 | End: 2021-08-20

## 2021-08-13 RX ORDER — CETIRIZINE HYDROCHLORIDE 10 MG/1
10 TABLET ORAL
Status: SHIPPED | COMMUNITY
End: 2021-10-25 | Stop reason: SDUPTHER

## 2021-08-13 RX ORDER — METOPROLOL TARTRATE 1 MG/ML
5 INJECTION, SOLUTION INTRAVENOUS ONCE
Status: COMPLETED | OUTPATIENT
Start: 2021-08-13 | End: 2021-08-13

## 2021-08-13 RX ORDER — ACETAMINOPHEN 325 MG/1
650 TABLET ORAL ONCE
Status: COMPLETED | OUTPATIENT
Start: 2021-08-13 | End: 2021-08-13

## 2021-08-13 RX ORDER — MIDODRINE HYDROCHLORIDE 5 MG/1
5 TABLET ORAL
Status: SHIPPED | COMMUNITY
End: 2021-10-12

## 2021-08-13 RX ORDER — GENTAMICIN SULFATE 40 MG/ML
180 INJECTION, SOLUTION INTRAMUSCULAR; INTRAVENOUS DAILY
Status: DISCONTINUED | OUTPATIENT
Start: 2021-08-14 | End: 2021-08-13

## 2021-08-13 RX ORDER — ACETAMINOPHEN 325 MG/1
650 TABLET ORAL EVERY 4 HOURS PRN
COMMUNITY

## 2021-08-13 RX ORDER — MAGNESIUM OXIDE 400 MG/1
400 TABLET ORAL DAILY
Status: SHIPPED | COMMUNITY
End: 2021-10-25 | Stop reason: SDUPTHER

## 2021-08-13 RX ORDER — FUROSEMIDE 40 MG/1
40 TABLET ORAL DAILY
Status: ON HOLD | COMMUNITY
End: 2021-12-29

## 2021-08-13 RX ADMIN — METOPROLOL TARTRATE 5 MG: 5 INJECTION INTRAVENOUS at 11:56

## 2021-08-13 RX ADMIN — ACETAMINOPHEN 650 MG: 325 TABLET, FILM COATED ORAL at 11:55

## 2021-08-13 RX ADMIN — GENTAMICIN SULFATE 180 MG: 40 INJECTION, SOLUTION INTRAMUSCULAR; INTRAVENOUS at 11:58

## 2021-08-13 ASSESSMENT — FIBROSIS 4 INDEX: FIB4 SCORE: 0.9

## 2021-08-13 NOTE — ED NOTES
Reviewed discharge instructions with pt. Verbalized understanding. Pt out of ER in wheelchair with rehab transport in stable condition

## 2021-08-13 NOTE — ED NOTES
"Patient was assisted to restroom to obtain urine sample. Patient confident in ability to ambulate safely. Patient provided nonskid socks and was assisted by myself to restroom. After sample was obtained patient began to ambulate back to room. Patient stated \"I feel dizzy\", became unable to bear weight, leaned backwards and was assisted to ground to a sitting position. Patient did not hit head. Patient did not lose consciousness . ED staff obtained an ER rMiramonte. Patient was transferred to [facility] without incident. Bilateral ankle pain noted secondary to event. RN and attending RN notified of event.   "

## 2021-08-13 NOTE — ED NOTES
Pt up to BR with tech, on the way back pt became dizzy and was assisted to the ground by ER tech who was in contact with pt. Pt reports bilateral ankle pain (no deformity noted) following incident. -LOC.  Pt assisted to bed and taken back to room, ERP notified.  PIV established, blood drawn and sent to lab. Lab called for BC #2.  Urine sent to lab.

## 2021-08-13 NOTE — ED TRIAGE NOTES
Chief Complaint   Patient presents with   • Painful Urination     BIB EMS from advanced [facility] rehab facility for +UTI that they are unable to treat due to her many allergies to abx.       Pt diagnosed 3 days ago, has not received any treatment due to allergies. Has had allergy testing to attempt to find an antibiotic for pt. Chart up for ERP.
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
## 2021-08-17 NOTE — ED NOTES
ED Positive Culture Follow-up/Notification Note:    Date: 8/17/2021     Patient seen in the ED on 8/13/2021 for dysuria.   1. Acute UTI    2. Essential hypertension       Discharge Medication List as of 8/13/2021 12:41 PM      START taking these medications    Details   gentamicin (GARAMYCIN) 40 MG/ML Solution Inject 4.5 mL as directed every day for 7 days.This prescription is transmitted by a pharmacist under the authority of a collaborative practice agreement.Disp-40 mL, R-0, Normal             Allergies: Ancef [cefazolin], Bactrim [sulfamethoxazole w-trimethoprim], Bee venom, Buprenorphine, Clindamycin, Contrast media with iodine [iodine], Doxycycline, Econazole, Flagyl  [metronidazole], Floxin [ofloxacin], Gadolinium derivatives, Hydrocodone-acetaminophen, Iodine, Keflex, Levofloxacin, Morphine, Naloxone, Nitrofurantoin, Norco [apap-fd&c yellow #10 [place]-hydrocodone], Nyquil, Oxycodone, Paricalcitol, Penicillins, Tape, Tramadol, Vicks dayquil cold, Azithromycin, Bextra [valdecoxib], Linezolid, Codeine, Sulfa drugs, and Tygacil [tigecycline]     Vitals:    08/13/21 1100 08/13/21 1116 08/13/21 1201 08/13/21 1300   BP: (!) 236/132  (!) 250/117 (!) 227/116   Pulse: 96  75 91   Resp: (!) 22  20    Temp:    36.8 °C (98.3 °F)   TempSrc:    Temporal   SpO2: 94%  94% 95%   Weight:  99.8 kg (220 lb)         Final cultures:   Results     Procedure Component Value Units Date/Time    URINE CULTURE(NEW) [[id number]]  (Abnormal)  (Susceptibility) Collected: 08/13/21 1005    Order Status: Completed Specimen: Urine Updated: 08/15/21 1119     Significant Indicator POS     Source UR     Site -     Culture Result -      Klebsiella pneumoniae  >100,000 cfu/mL      Narrative:      Indication for culture:->Emergency Room Patient  Indication for culture:->Emergency Room Patient    Susceptibility     Klebsiella pneumoniae (1)     Antibiotic Interpretation Microscan Method Status    Amikacin  [*]  Sensitive <=16 mcg/mL DOYLE Final     "Amoxicillin/CA  [*]  Sensitive <=8/4 mcg/mL DOYLE Final    Aztreonam  [*]  Sensitive <=4 mcg/mL DOYLE Final    Ceftolozane+Tazobactam  [*]  Sensitive <=2 mcg/mL DOYLE Final    Ceftriaxone Sensitive <=1 mcg/mL DOYLE Final    Ceftazidime  [*]  Sensitive <=1 mcg/mL DOYLE Final    Cefazolin Sensitive <=2 mcg/mL DOYLE Final    Ciprofloxacin Sensitive <=0.25 mcg/mL DOYLE Final    Cefepime Sensitive <=2 mcg/mL DOYLE Final    Cefuroxime Sensitive <=4 mcg/mL DOYLE Final    Ceftazidime+Avibactam  [*]  Sensitive <=4 mcg/mL DOYLE Final    Ampicillin/sulbactam Sensitive 8/4 mcg/mL DOYLE Final    Ertapenem  [*]  Sensitive <=0.5 mcg/mL DOYLE Final    Tobramycin Sensitive <=2 mcg/mL DOYLE Final    Nitrofurantoin Intermediate 64 mcg/mL DOYLE Final    Gentamicin Sensitive <=2 mcg/mL DOYLE Final    Imipenem  [*]  Sensitive <=1 mcg/mL DOYLE Final    Levofloxacin Sensitive <=0.5 mcg/mL DOYLE Final    Meropenem  [*]  Sensitive <=1 mcg/mL DOYLE Final    Meropenem/Vaborbactam  [*]  Sensitive <=2 mcg/mL DOYLE Final    Pip/Tazobactam Sensitive <=8 mcg/mL DOYLE Final    Trimeth/Sulfa Sensitive <=0.5/9.5 mcg/mL DOYLE Final    Tetracycline  [*]  Intermediate 8 mcg/mL DOYLE Final    Tigecycline Sensitive <=2 mcg/mL DOYLE Final           [*]  Suppressed Antibiotic                 BLOOD CULTURE [[id number]] Collected: 08/13/21 1033    Order Status: Completed Specimen: Blood from Peripheral Updated: 08/14/21 0903     Significant Indicator NEG     Source BLD     Site PERIPHERAL     Culture Result No Growth  Note: Blood cultures are incubated for 5 days and  are monitored continuously.Positive blood cultures  are called to the RN and reported as soon as  they are identified.      Narrative:      Per Hospital Policy: Only change Specimen Src: to \"Line\" if  specified by physician order.  Left Hand    BLOOD CULTURE [[id number]] Collected: 08/13/21 1005    Order Status: Completed Specimen: Blood from Peripheral Updated: 08/14/21 0903     Significant Indicator NEG     Source BLD     Site PERIPHERAL     " "Culture Result No Growth  Note: Blood cultures are incubated for 5 days and  are monitored continuously.Positive blood cultures  are called to the RN and reported as soon as  they are identified.      Narrative:      Per Hospital Policy: Only change Specimen Src: to \"Line\" if  specified by physician order.  No site indicated    URINALYSIS [[id number]]  (Abnormal) Collected: 08/13/21 1005    Order Status: Completed Specimen: Blood Updated: 08/13/21 1102     Color Yellow     Character Clear     Specific Gravity 1.019     Ph 5.5     Glucose Negative mg/dL      Ketones Negative mg/dL      Protein Negative mg/dL      Bilirubin Negative     Urobilinogen, Urine 0.2     Nitrite Positive     Leukocyte Esterase Trace     Occult Blood Negative     Micro Urine Req Microscopic    Narrative:      Indication for culture:->Emergency Room Patient          Plan:   Appropriate antibiotic therapy prescribed. No changes required based upon culture result.    Isabela Schaefer, PharmD  PGY2 Infectious Diseases Pharmacy Resident    "

## 2021-08-20 PROBLEM — R53.81 DEBILITY: Status: ACTIVE | Noted: 2021-08-20

## 2021-08-23 PROBLEM — Z87.19 HISTORY OF BLOODY STOOLS: Status: ACTIVE | Noted: 2021-08-23

## 2021-09-07 ENCOUNTER — DOCUMENTATION (OUTPATIENT)
Dept: VASCULAR LAB | Facility: MEDICAL CENTER | Age: 56
End: 2021-09-07

## 2021-09-07 NOTE — PROGRESS NOTES
Called and left VM for pt to call back to get rescheduled with Dr. Bloch to an open appt time or within the time frames of 9/28 (2-4pm) and 9/29 (9:40-11:40am).

## 2021-09-13 ENCOUNTER — HOSPITAL ENCOUNTER (OUTPATIENT)
Dept: CARDIOLOGY | Facility: MEDICAL CENTER | Age: 56
End: 2021-09-13
Attending: INTERNAL MEDICINE
Payer: MEDICARE

## 2021-09-13 DIAGNOSIS — I42.8 OTHER CARDIOMYOPATHY (HCC): ICD-10-CM

## 2021-09-13 PROCEDURE — 93306 TTE W/DOPPLER COMPLETE: CPT

## 2021-09-14 LAB
LV EJECT FRACT  99904: 65
LV EJECT FRACT MOD 2C 99903: 72.06
LV EJECT FRACT MOD 4C 99902: 58.84
LV EJECT FRACT MOD BP 99901: 66.98

## 2021-09-14 PROCEDURE — 93306 TTE W/DOPPLER COMPLETE: CPT | Mod: 26 | Performed by: INTERNAL MEDICINE

## 2021-09-15 ENCOUNTER — HOSPITAL ENCOUNTER (OUTPATIENT)
Dept: LAB | Facility: MEDICAL CENTER | Age: 56
End: 2021-09-15
Attending: INTERNAL MEDICINE
Payer: MEDICARE

## 2021-09-15 ENCOUNTER — HOSPITAL ENCOUNTER (OUTPATIENT)
Dept: LAB | Facility: MEDICAL CENTER | Age: 56
End: 2021-09-15
Attending: STUDENT IN AN ORGANIZED HEALTH CARE EDUCATION/TRAINING PROGRAM
Payer: MEDICARE

## 2021-09-15 LAB
ANION GAP SERPL CALC-SCNC: 13 MMOL/L (ref 7–16)
ANISOCYTOSIS BLD QL SMEAR: ABNORMAL
BASOPHILS # BLD AUTO: 0.3 % (ref 0–1.8)
BASOPHILS # BLD: 0.02 K/UL (ref 0–0.12)
BUN SERPL-MCNC: 15 MG/DL (ref 8–22)
CALCIUM SERPL-MCNC: 9.4 MG/DL (ref 8.5–10.5)
CHLORIDE SERPL-SCNC: 105 MMOL/L (ref 96–112)
CO2 SERPL-SCNC: 19 MMOL/L (ref 20–33)
COMMENT 1642: NORMAL
CREAT SERPL-MCNC: 0.94 MG/DL (ref 0.5–1.4)
EOSINOPHIL # BLD AUTO: 0.12 K/UL (ref 0–0.51)
EOSINOPHIL NFR BLD: 1.6 % (ref 0–6.9)
ERYTHROCYTE [DISTWIDTH] IN BLOOD BY AUTOMATED COUNT: 62.9 FL (ref 35.9–50)
FASTING STATUS PATIENT QL REPORTED: NORMAL
GLUCOSE SERPL-MCNC: 82 MG/DL (ref 65–99)
HCT VFR BLD AUTO: 35.1 % (ref 37–47)
HGB BLD-MCNC: 10 G/DL (ref 12–16)
IMM GRANULOCYTES # BLD AUTO: 0.01 K/UL (ref 0–0.11)
IMM GRANULOCYTES NFR BLD AUTO: 0.1 % (ref 0–0.9)
LYMPHOCYTES # BLD AUTO: 1.83 K/UL (ref 1–4.8)
LYMPHOCYTES NFR BLD: 24.7 % (ref 22–41)
MAGNESIUM SERPL-MCNC: 1.7 MG/DL (ref 1.5–2.5)
MCH RBC QN AUTO: 24.4 PG (ref 27–33)
MCHC RBC AUTO-ENTMCNC: 28.5 G/DL (ref 33.6–35)
MCV RBC AUTO: 85.6 FL (ref 81.4–97.8)
MONOCYTES # BLD AUTO: 0.6 K/UL (ref 0–0.85)
MONOCYTES NFR BLD AUTO: 8.1 % (ref 0–13.4)
MORPHOLOGY BLD-IMP: NORMAL
NEUTROPHILS # BLD AUTO: 4.82 K/UL (ref 2–7.15)
NEUTROPHILS NFR BLD: 65.2 % (ref 44–72)
NRBC # BLD AUTO: 0 K/UL
NRBC BLD-RTO: 0 /100 WBC
PLATELET # BLD AUTO: 432 K/UL (ref 164–446)
PLATELET BLD QL SMEAR: NORMAL
PMV BLD AUTO: 10.2 FL (ref 9–12.9)
POTASSIUM SERPL-SCNC: 3.9 MMOL/L (ref 3.6–5.5)
RBC # BLD AUTO: 4.1 M/UL (ref 4.2–5.4)
RBC BLD AUTO: PRESENT
SODIUM SERPL-SCNC: 137 MMOL/L (ref 135–145)
WBC # BLD AUTO: 7.4 K/UL (ref 4.8–10.8)

## 2021-09-15 PROCEDURE — 36415 COLL VENOUS BLD VENIPUNCTURE: CPT

## 2021-09-15 PROCEDURE — 85025 COMPLETE CBC W/AUTO DIFF WBC: CPT

## 2021-09-15 PROCEDURE — 86317 IMMUNOASSAY INFECTIOUS AGENT: CPT | Mod: 91

## 2021-09-15 PROCEDURE — 83735 ASSAY OF MAGNESIUM: CPT

## 2021-09-15 PROCEDURE — 82787 IGG 1 2 3 OR 4 EACH: CPT | Mod: 91

## 2021-09-15 PROCEDURE — 86741 NEISSERIA MENINGITIDIS: CPT | Mod: 91

## 2021-09-15 PROCEDURE — 80048 BASIC METABOLIC PNL TOTAL CA: CPT

## 2021-09-17 LAB
IGG1 SER-MCNC: 441 MG/DL (ref 240–1118)
IGG2 SER-MCNC: 135 MG/DL (ref 124–549)
IGG3 SER-MCNC: 9 MG/DL (ref 21–134)
IGG4 SER-MCNC: 49 MG/DL (ref 1–123)

## 2021-09-18 LAB
C DIPHTHERIAE IGG SER-ACNC: 0.4 IU/ML
C TETANI TOXOID IGG SERPL IA-ACNC: 3.9 IU/ML
HAEM INFLU B IGG SER-MCNC: 0 UG/ML

## 2021-09-20 ENCOUNTER — PATIENT MESSAGE (OUTPATIENT)
Dept: CARDIOLOGY | Facility: MEDICAL CENTER | Age: 56
End: 2021-09-20

## 2021-09-20 LAB
S PN DA SERO 19F IGG SER-MCNC: 2.82 UG/ML
S PNEUM DA 1 IGG SER-MCNC: 0.51 UG/ML
S PNEUM DA 12F IGG SER-MCNC: 0.01 UG/ML
S PNEUM DA 14 IGG SER-MCNC: 0.81 UG/ML
S PNEUM DA 18C IGG SER-MCNC: 0.57 UG/ML
S PNEUM DA 23F IGG SER-MCNC: 0.63 UG/ML
S PNEUM DA 3 IGG SER-MCNC: 0.2 UG/ML
S PNEUM DA 4 IGG SER-MCNC: 0 UG/ML
S PNEUM DA 5 IGG SER-MCNC: 2.44 UG/ML
S PNEUM DA 6B IGG SER-MCNC: 0.16 UG/ML
S PNEUM DA 7F IGG SER-MCNC: 0.94 UG/ML
S PNEUM DA 8 IGG SER-MCNC: 0.35 UG/ML
S PNEUM DA 9N IGG SER-MCNC: 0.15 UG/ML
S PNEUM DA 9V IGG SER-MCNC: 0.04 UG/ML
S PNEUM SEROTYPE IGG SER-IMP: NORMAL

## 2021-09-21 ENCOUNTER — HOSPITAL ENCOUNTER (OUTPATIENT)
Dept: LAB | Facility: MEDICAL CENTER | Age: 56
End: 2021-09-21
Attending: NURSE PRACTITIONER
Payer: MEDICARE

## 2021-09-21 LAB
ALBUMIN SERPL BCP-MCNC: 3.5 G/DL (ref 3.2–4.9)
ALBUMIN/GLOB SERPL: 1.3 G/DL
ALP SERPL-CCNC: 83 U/L (ref 30–99)
ALT SERPL-CCNC: 49 U/L (ref 2–50)
ANION GAP SERPL CALC-SCNC: 13 MMOL/L (ref 7–16)
ANISOCYTOSIS BLD QL SMEAR: ABNORMAL
AST SERPL-CCNC: 45 U/L (ref 12–45)
BASOPHILS # BLD AUTO: 0.3 % (ref 0–1.8)
BASOPHILS # BLD: 0.02 K/UL (ref 0–0.12)
BILIRUB SERPL-MCNC: 0.3 MG/DL (ref 0.1–1.5)
BUN SERPL-MCNC: 8 MG/DL (ref 8–22)
CALCIUM SERPL-MCNC: 9 MG/DL (ref 8.5–10.5)
CHLORIDE SERPL-SCNC: 109 MMOL/L (ref 96–112)
CO2 SERPL-SCNC: 18 MMOL/L (ref 20–33)
COMMENT 1642: NORMAL
CREAT SERPL-MCNC: 0.82 MG/DL (ref 0.5–1.4)
CRP SERPL HS-MCNC: <0.3 MG/DL (ref 0–0.75)
EOSINOPHIL # BLD AUTO: 0.13 K/UL (ref 0–0.51)
EOSINOPHIL NFR BLD: 2 % (ref 0–6.9)
ERYTHROCYTE [DISTWIDTH] IN BLOOD BY AUTOMATED COUNT: 60.6 FL (ref 35.9–50)
ERYTHROCYTE [SEDIMENTATION RATE] IN BLOOD BY WESTERGREN METHOD: 22 MM/HOUR (ref 0–25)
FASTING STATUS PATIENT QL REPORTED: NORMAL
GLOBULIN SER CALC-MCNC: 2.6 G/DL (ref 1.9–3.5)
GLUCOSE SERPL-MCNC: 76 MG/DL (ref 65–99)
HCT VFR BLD AUTO: 33 % (ref 37–47)
HGB BLD-MCNC: 9.4 G/DL (ref 12–16)
HYPOCHROMIA BLD QL SMEAR: ABNORMAL
IMM GRANULOCYTES # BLD AUTO: 0.02 K/UL (ref 0–0.11)
IMM GRANULOCYTES NFR BLD AUTO: 0.3 % (ref 0–0.9)
LYMPHOCYTES # BLD AUTO: 1.51 K/UL (ref 1–4.8)
LYMPHOCYTES NFR BLD: 22.9 % (ref 22–41)
MCH RBC QN AUTO: 24 PG (ref 27–33)
MCHC RBC AUTO-ENTMCNC: 28.5 G/DL (ref 33.6–35)
MCV RBC AUTO: 84.4 FL (ref 81.4–97.8)
MICROCYTES BLD QL SMEAR: ABNORMAL
MONOCYTES # BLD AUTO: 0.63 K/UL (ref 0–0.85)
MONOCYTES NFR BLD AUTO: 9.6 % (ref 0–13.4)
MORPHOLOGY BLD-IMP: NORMAL
N MEN SG A IGG SER IA-MCNC: 2 UG/ML
N MEN SG C IGG SER IA-MCNC: 0.1 UG/ML
N MEN SG W135 IGG SER IA-MCNC: 0.1 UG/ML
N MEN SG Y IGG SER IA-MCNC: 0.1 UG/ML
NEUTROPHILS # BLD AUTO: 4.28 K/UL (ref 2–7.15)
NEUTROPHILS NFR BLD: 64.9 % (ref 44–72)
NRBC # BLD AUTO: 0 K/UL
NRBC BLD-RTO: 0 /100 WBC
PLATELET # BLD AUTO: 354 K/UL (ref 164–446)
PLATELET BLD QL SMEAR: NORMAL
PMV BLD AUTO: 10.1 FL (ref 9–12.9)
POTASSIUM SERPL-SCNC: 3.7 MMOL/L (ref 3.6–5.5)
PROT SERPL-MCNC: 6.1 G/DL (ref 6–8.2)
RBC # BLD AUTO: 3.91 M/UL (ref 4.2–5.4)
RBC BLD AUTO: PRESENT
SODIUM SERPL-SCNC: 140 MMOL/L (ref 135–145)
WBC # BLD AUTO: 6.6 K/UL (ref 4.8–10.8)

## 2021-09-21 PROCEDURE — 85025 COMPLETE CBC W/AUTO DIFF WBC: CPT

## 2021-09-21 PROCEDURE — 86140 C-REACTIVE PROTEIN: CPT

## 2021-09-21 PROCEDURE — 85652 RBC SED RATE AUTOMATED: CPT

## 2021-09-21 PROCEDURE — 36415 COLL VENOUS BLD VENIPUNCTURE: CPT

## 2021-09-21 PROCEDURE — 80053 COMPREHEN METABOLIC PANEL: CPT

## 2021-09-24 ENCOUNTER — HOSPITAL ENCOUNTER (EMERGENCY)
Facility: MEDICAL CENTER | Age: 56
End: 2021-09-24
Attending: EMERGENCY MEDICINE
Payer: MEDICARE

## 2021-09-24 ENCOUNTER — APPOINTMENT (OUTPATIENT)
Dept: RADIOLOGY | Facility: MEDICAL CENTER | Age: 56
End: 2021-09-24
Attending: EMERGENCY MEDICINE
Payer: MEDICARE

## 2021-09-24 VITALS
DIASTOLIC BLOOD PRESSURE: 108 MMHG | HEIGHT: 64 IN | BODY MASS INDEX: 37.56 KG/M2 | WEIGHT: 220 LBS | RESPIRATION RATE: 18 BRPM | SYSTOLIC BLOOD PRESSURE: 182 MMHG | OXYGEN SATURATION: 95 % | HEART RATE: 96 BPM | TEMPERATURE: 98 F

## 2021-09-24 DIAGNOSIS — G62.9 NEUROPATHY: ICD-10-CM

## 2021-09-24 DIAGNOSIS — M54.2 NECK PAIN: ICD-10-CM

## 2021-09-24 LAB
ALBUMIN SERPL BCP-MCNC: 3.7 G/DL (ref 3.2–4.9)
ALBUMIN/GLOB SERPL: 1.5 G/DL
ALP SERPL-CCNC: 90 U/L (ref 30–99)
ALT SERPL-CCNC: 41 U/L (ref 2–50)
ANION GAP SERPL CALC-SCNC: 12 MMOL/L (ref 7–16)
ANISOCYTOSIS BLD QL SMEAR: ABNORMAL
AST SERPL-CCNC: 42 U/L (ref 12–45)
BASOPHILS # BLD AUTO: 0.6 % (ref 0–1.8)
BASOPHILS # BLD: 0.02 K/UL (ref 0–0.12)
BILIRUB SERPL-MCNC: 0.4 MG/DL (ref 0.1–1.5)
BUN SERPL-MCNC: 4 MG/DL (ref 8–22)
CALCIUM SERPL-MCNC: 9.5 MG/DL (ref 8.5–10.5)
CHLORIDE SERPL-SCNC: 108 MMOL/L (ref 96–112)
CO2 SERPL-SCNC: 20 MMOL/L (ref 20–33)
COMMENT 1642: NORMAL
CREAT SERPL-MCNC: 0.7 MG/DL (ref 0.5–1.4)
EOSINOPHIL # BLD AUTO: 0.16 K/UL (ref 0–0.51)
EOSINOPHIL NFR BLD: 4.7 % (ref 0–6.9)
ERYTHROCYTE [DISTWIDTH] IN BLOOD BY AUTOMATED COUNT: 58.8 FL (ref 35.9–50)
GLOBULIN SER CALC-MCNC: 2.5 G/DL (ref 1.9–3.5)
GLUCOSE SERPL-MCNC: 96 MG/DL (ref 65–99)
HCT VFR BLD AUTO: 33.8 % (ref 37–47)
HGB BLD-MCNC: 10 G/DL (ref 12–16)
IMM GRANULOCYTES # BLD AUTO: 0.01 K/UL (ref 0–0.11)
IMM GRANULOCYTES NFR BLD AUTO: 0.3 % (ref 0–0.9)
LYMPHOCYTES # BLD AUTO: 0.99 K/UL (ref 1–4.8)
LYMPHOCYTES NFR BLD: 29.4 % (ref 22–41)
MCH RBC QN AUTO: 24.5 PG (ref 27–33)
MCHC RBC AUTO-ENTMCNC: 29.6 G/DL (ref 33.6–35)
MCV RBC AUTO: 82.8 FL (ref 81.4–97.8)
MONOCYTES # BLD AUTO: 0.42 K/UL (ref 0–0.85)
MONOCYTES NFR BLD AUTO: 12.5 % (ref 0–13.4)
MORPHOLOGY BLD-IMP: NORMAL
NEUTROPHILS # BLD AUTO: 1.77 K/UL (ref 2–7.15)
NEUTROPHILS NFR BLD: 52.5 % (ref 44–72)
NRBC # BLD AUTO: 0 K/UL
NRBC BLD-RTO: 0 /100 WBC
PLATELET # BLD AUTO: 357 K/UL (ref 164–446)
PLATELET BLD QL SMEAR: NORMAL
PMV BLD AUTO: 9.9 FL (ref 9–12.9)
POTASSIUM SERPL-SCNC: 3.9 MMOL/L (ref 3.6–5.5)
PROT SERPL-MCNC: 6.2 G/DL (ref 6–8.2)
RBC # BLD AUTO: 4.08 M/UL (ref 4.2–5.4)
RBC BLD AUTO: PRESENT
SODIUM SERPL-SCNC: 140 MMOL/L (ref 135–145)
WBC # BLD AUTO: 3.4 K/UL (ref 4.8–10.8)

## 2021-09-24 PROCEDURE — 99284 EMERGENCY DEPT VISIT MOD MDM: CPT

## 2021-09-24 PROCEDURE — 85025 COMPLETE CBC W/AUTO DIFF WBC: CPT

## 2021-09-24 PROCEDURE — 700117 HCHG RX CONTRAST REV CODE 255: Performed by: EMERGENCY MEDICINE

## 2021-09-24 PROCEDURE — 70491 CT SOFT TISSUE NECK W/DYE: CPT | Mod: MG

## 2021-09-24 PROCEDURE — 96375 TX/PRO/DX INJ NEW DRUG ADDON: CPT | Mod: XU

## 2021-09-24 PROCEDURE — 700105 HCHG RX REV CODE 258: Performed by: EMERGENCY MEDICINE

## 2021-09-24 PROCEDURE — 80053 COMPREHEN METABOLIC PANEL: CPT

## 2021-09-24 PROCEDURE — 700111 HCHG RX REV CODE 636 W/ 250 OVERRIDE (IP): Performed by: EMERGENCY MEDICINE

## 2021-09-24 PROCEDURE — 96374 THER/PROPH/DIAG INJ IV PUSH: CPT | Mod: XU

## 2021-09-24 RX ORDER — DIPHENHYDRAMINE HYDROCHLORIDE 50 MG/ML
25 INJECTION INTRAMUSCULAR; INTRAVENOUS ONCE
Status: COMPLETED | OUTPATIENT
Start: 2021-09-24 | End: 2021-09-24

## 2021-09-24 RX ORDER — SODIUM CHLORIDE, SODIUM LACTATE, POTASSIUM CHLORIDE, CALCIUM CHLORIDE 600; 310; 30; 20 MG/100ML; MG/100ML; MG/100ML; MG/100ML
1000 INJECTION, SOLUTION INTRAVENOUS ONCE
Status: COMPLETED | OUTPATIENT
Start: 2021-09-24 | End: 2021-09-24

## 2021-09-24 RX ORDER — METHYLPREDNISOLONE SODIUM SUCCINATE 125 MG/2ML
125 INJECTION, POWDER, LYOPHILIZED, FOR SOLUTION INTRAMUSCULAR; INTRAVENOUS ONCE
Status: COMPLETED | OUTPATIENT
Start: 2021-09-24 | End: 2021-09-24

## 2021-09-24 RX ADMIN — SODIUM CHLORIDE, POTASSIUM CHLORIDE, SODIUM LACTATE AND CALCIUM CHLORIDE 1000 ML: 600; 310; 30; 20 INJECTION, SOLUTION INTRAVENOUS at 12:57

## 2021-09-24 RX ADMIN — METHYLPREDNISOLONE SODIUM SUCCINATE 125 MG: 125 INJECTION, POWDER, FOR SOLUTION INTRAMUSCULAR; INTRAVENOUS at 12:52

## 2021-09-24 RX ADMIN — IOHEXOL 80 ML: 350 INJECTION, SOLUTION INTRAVENOUS at 13:45

## 2021-09-24 RX ADMIN — DIPHENHYDRAMINE HYDROCHLORIDE 25 MG: 50 INJECTION INTRAMUSCULAR; INTRAVENOUS at 12:53

## 2021-09-24 ASSESSMENT — FIBROSIS 4 INDEX: FIB4 SCORE: 1.04

## 2021-09-24 ASSESSMENT — LIFESTYLE VARIABLES: DO YOU DRINK ALCOHOL: NO

## 2021-09-24 NOTE — ED TRIAGE NOTES
"..  Chief Complaint   Patient presents with   • Neck Swelling     pt sent from [facility] for her left sided neck swelling, and pain x's 3 days ago worsening. unable to eat not hungry    • Neck Pain     Pt reports a MRSA infection in sinus. Pt currently being treated for it      .BP (!) 185/117   Pulse (!) 109   Temp 36.7 °C (98 °F) (Temporal)   Resp 16   Ht 1.626 m (5' 4\")   Wt 99.8 kg (220 lb)   SpO2 100%        Explained triage process, to waiting room. Asked to inform RN if questions or concerns arise.   "
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
## 2021-09-24 NOTE — ED NOTES
Pt discharge home. Pt given discharge instructionsPt verbalized understanding, all questions answered ,vss upon d/c.

## 2021-09-29 ENCOUNTER — HOSPITAL ENCOUNTER (OUTPATIENT)
Facility: MEDICAL CENTER | Age: 56
End: 2021-09-29
Attending: OTOLARYNGOLOGY
Payer: MEDICARE

## 2021-09-29 LAB
AMBIGUOUS DTTM AMBI4: NORMAL
AMBIGUOUS DTTM AMBI4: NORMAL

## 2021-09-29 PROCEDURE — 87070 CULTURE OTHR SPECIMN AEROBIC: CPT | Mod: 91

## 2021-09-29 PROCEDURE — 87186 SC STD MICRODIL/AGAR DIL: CPT | Mod: 91

## 2021-09-29 PROCEDURE — 87075 CULTR BACTERIA EXCEPT BLOOD: CPT | Mod: 91

## 2021-09-29 PROCEDURE — 87077 CULTURE AEROBIC IDENTIFY: CPT

## 2021-09-30 ENCOUNTER — HOSPITAL ENCOUNTER (OUTPATIENT)
Dept: LAB | Facility: MEDICAL CENTER | Age: 56
End: 2021-09-30
Attending: STUDENT IN AN ORGANIZED HEALTH CARE EDUCATION/TRAINING PROGRAM
Payer: MEDICARE

## 2021-09-30 ENCOUNTER — APPOINTMENT (OUTPATIENT)
Dept: RADIOLOGY | Facility: MEDICAL CENTER | Age: 56
End: 2021-09-30
Attending: STUDENT IN AN ORGANIZED HEALTH CARE EDUCATION/TRAINING PROGRAM
Payer: MEDICARE

## 2021-09-30 DIAGNOSIS — Z12.31 VISIT FOR SCREENING MAMMOGRAM: ICD-10-CM

## 2021-09-30 LAB
ALBUMIN SERPL BCP-MCNC: 3.5 G/DL (ref 3.2–4.9)
ALBUMIN/GLOB SERPL: 1.5 G/DL
ALP SERPL-CCNC: 99 U/L (ref 30–99)
ALT SERPL-CCNC: 37 U/L (ref 2–50)
ANION GAP SERPL CALC-SCNC: 9 MMOL/L (ref 7–16)
ANISOCYTOSIS BLD QL SMEAR: ABNORMAL
AST SERPL-CCNC: 32 U/L (ref 12–45)
BASOPHILS # BLD AUTO: 0.3 % (ref 0–1.8)
BASOPHILS # BLD: 0.02 K/UL (ref 0–0.12)
BILIRUB SERPL-MCNC: 0.2 MG/DL (ref 0.1–1.5)
BUN SERPL-MCNC: 9 MG/DL (ref 8–22)
BURR CELLS BLD QL SMEAR: NORMAL
CALCIUM SERPL-MCNC: 8.8 MG/DL (ref 8.5–10.5)
CHLORIDE SERPL-SCNC: 111 MMOL/L (ref 96–112)
CO2 SERPL-SCNC: 18 MMOL/L (ref 20–33)
COMMENT 1642: NORMAL
CREAT SERPL-MCNC: 0.93 MG/DL (ref 0.5–1.4)
CRP SERPL HS-MCNC: <0.3 MG/DL (ref 0–0.75)
EOSINOPHIL # BLD AUTO: 0.17 K/UL (ref 0–0.51)
EOSINOPHIL NFR BLD: 2.5 % (ref 0–6.9)
ERYTHROCYTE [DISTWIDTH] IN BLOOD BY AUTOMATED COUNT: 60.1 FL (ref 35.9–50)
ERYTHROCYTE [SEDIMENTATION RATE] IN BLOOD BY WESTERGREN METHOD: 19 MM/HOUR (ref 0–25)
GLOBULIN SER CALC-MCNC: 2.4 G/DL (ref 1.9–3.5)
GLUCOSE SERPL-MCNC: 69 MG/DL (ref 65–99)
HCT VFR BLD AUTO: 29.5 % (ref 37–47)
HGB BLD-MCNC: 8.2 G/DL (ref 12–16)
HYPOCHROMIA BLD QL SMEAR: ABNORMAL
IMM GRANULOCYTES # BLD AUTO: 0.03 K/UL (ref 0–0.11)
IMM GRANULOCYTES NFR BLD AUTO: 0.4 % (ref 0–0.9)
LYMPHOCYTES # BLD AUTO: 1.11 K/UL (ref 1–4.8)
LYMPHOCYTES NFR BLD: 16.6 % (ref 22–41)
MCH RBC QN AUTO: 23.4 PG (ref 27–33)
MCHC RBC AUTO-ENTMCNC: 27.8 G/DL (ref 33.6–35)
MCV RBC AUTO: 84.3 FL (ref 81.4–97.8)
MICROCYTES BLD QL SMEAR: ABNORMAL
MONOCYTES # BLD AUTO: 0.89 K/UL (ref 0–0.85)
MONOCYTES NFR BLD AUTO: 13.3 % (ref 0–13.4)
MORPHOLOGY BLD-IMP: NORMAL
NEUTROPHILS # BLD AUTO: 4.48 K/UL (ref 2–7.15)
NEUTROPHILS NFR BLD: 66.9 % (ref 44–72)
NRBC # BLD AUTO: 0.03 K/UL
NRBC BLD-RTO: 0.4 /100 WBC
OVALOCYTES BLD QL SMEAR: NORMAL
PLATELET # BLD AUTO: 327 K/UL (ref 164–446)
PLATELET BLD QL SMEAR: NORMAL
PMV BLD AUTO: 10.4 FL (ref 9–12.9)
POIKILOCYTOSIS BLD QL SMEAR: NORMAL
POLYCHROMASIA BLD QL SMEAR: NORMAL
POTASSIUM SERPL-SCNC: 4.3 MMOL/L (ref 3.6–5.5)
PROT SERPL-MCNC: 5.9 G/DL (ref 6–8.2)
RBC # BLD AUTO: 3.5 M/UL (ref 4.2–5.4)
RBC BLD AUTO: PRESENT
SODIUM SERPL-SCNC: 138 MMOL/L (ref 135–145)
WBC # BLD AUTO: 6.7 K/UL (ref 4.8–10.8)

## 2021-09-30 PROCEDURE — 85652 RBC SED RATE AUTOMATED: CPT

## 2021-09-30 PROCEDURE — 86140 C-REACTIVE PROTEIN: CPT

## 2021-09-30 PROCEDURE — 85025 COMPLETE CBC W/AUTO DIFF WBC: CPT

## 2021-09-30 PROCEDURE — 36415 COLL VENOUS BLD VENIPUNCTURE: CPT

## 2021-09-30 PROCEDURE — 80053 COMPREHEN METABOLIC PANEL: CPT

## 2021-10-01 LAB
BACTERIA WND AEROBE CULT: ABNORMAL
SIGNIFICANT IND 70042: ABNORMAL
SITE SITE: ABNORMAL
SOURCE SOURCE: ABNORMAL

## 2021-10-02 LAB
BACTERIA SPEC ANAEROBE CULT: NORMAL
BACTERIA SPEC ANAEROBE CULT: NORMAL
BACTERIA WND AEROBE CULT: ABNORMAL
SIGNIFICANT IND 70042: ABNORMAL
SIGNIFICANT IND 70042: NORMAL
SIGNIFICANT IND 70042: NORMAL
SITE SITE: ABNORMAL
SITE SITE: NORMAL
SITE SITE: NORMAL
SOURCE SOURCE: ABNORMAL
SOURCE SOURCE: NORMAL
SOURCE SOURCE: NORMAL

## 2021-10-04 DIAGNOSIS — G43.709 CHRONIC MIGRAINE W/O AURA W/O STATUS MIGRAINOSUS, NOT INTRACTABLE: Primary | ICD-10-CM

## 2021-10-04 RX ORDER — AMITRIPTYLINE HYDROCHLORIDE 10 MG/1
TABLET, FILM COATED ORAL
Qty: 90 TABLET | Refills: 1 | Status: ON HOLD | OUTPATIENT
Start: 2021-10-04 | End: 2021-12-29

## 2021-10-12 ENCOUNTER — OFFICE VISIT (OUTPATIENT)
Dept: CARDIOLOGY | Facility: MEDICAL CENTER | Age: 56
End: 2021-10-12
Payer: MEDICARE

## 2021-10-12 ENCOUNTER — TELEPHONE (OUTPATIENT)
Dept: CARDIOLOGY | Facility: MEDICAL CENTER | Age: 56
End: 2021-10-12

## 2021-10-12 VITALS
DIASTOLIC BLOOD PRESSURE: 74 MMHG | WEIGHT: 188 LBS | SYSTOLIC BLOOD PRESSURE: 100 MMHG | HEART RATE: 64 BPM | HEIGHT: 64 IN | RESPIRATION RATE: 14 BRPM | OXYGEN SATURATION: 100 % | BODY MASS INDEX: 32.1 KG/M2

## 2021-10-12 DIAGNOSIS — I27.82 CHRONIC PULMONARY EMBOLISM WITH ACUTE COR PULMONALE, UNSPECIFIED PULMONARY EMBOLISM TYPE (HCC): ICD-10-CM

## 2021-10-12 DIAGNOSIS — I26.09 CHRONIC PULMONARY EMBOLISM WITH ACUTE COR PULMONALE, UNSPECIFIED PULMONARY EMBOLISM TYPE (HCC): ICD-10-CM

## 2021-10-12 DIAGNOSIS — R00.0 TACHYCARDIA: ICD-10-CM

## 2021-10-12 LAB — EKG IMPRESSION: NORMAL

## 2021-10-12 PROCEDURE — 93000 ELECTROCARDIOGRAM COMPLETE: CPT | Performed by: INTERNAL MEDICINE

## 2021-10-12 PROCEDURE — 99214 OFFICE O/P EST MOD 30 MIN: CPT | Performed by: INTERNAL MEDICINE

## 2021-10-12 RX ORDER — HYDROCORTISONE 10 MG/1
10-20 TABLET ORAL 2 TIMES DAILY
Qty: 90 TABLET | Refills: 0 | Status: SHIPPED | OUTPATIENT
Start: 2021-10-12

## 2021-10-12 ASSESSMENT — FIBROSIS 4 INDEX: FIB4 SCORE: 0.92
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
## 2021-10-20 ENCOUNTER — APPOINTMENT (OUTPATIENT)
Dept: INTERNAL MEDICINE | Facility: OTHER | Age: 56
End: 2021-10-20
Payer: COMMERCIAL

## 2021-10-21 RX ORDER — ALBUTEROL SULFATE 90 UG/1
2 AEROSOL, METERED RESPIRATORY (INHALATION) EVERY 6 HOURS PRN
COMMUNITY
End: 2021-10-21 | Stop reason: SDUPTHER

## 2021-10-21 NOTE — TELEPHONE ENCOUNTER
Last seen: 09/13/21 by Dr. Mccullough  Next appt: None     Was the patient seen in the last year in this department? Yes   Does patient have an active prescription for medications requested? No   Received Request Via: Pharmacy

## 2021-10-24 RX ORDER — ALBUTEROL SULFATE 90 UG/1
2 AEROSOL, METERED RESPIRATORY (INHALATION) EVERY 6 HOURS PRN
Qty: 8.5 G | Refills: 5 | Status: SHIPPED | OUTPATIENT
Start: 2021-10-24

## 2021-10-25 ENCOUNTER — OFFICE VISIT (OUTPATIENT)
Dept: INTERNAL MEDICINE | Facility: OTHER | Age: 56
End: 2021-10-25
Payer: MEDICARE

## 2021-10-25 ENCOUNTER — HOSPITAL ENCOUNTER (OUTPATIENT)
Dept: LAB | Facility: MEDICAL CENTER | Age: 56
End: 2021-10-25
Attending: INTERNAL MEDICINE
Payer: MEDICARE

## 2021-10-25 VITALS
OXYGEN SATURATION: 95 % | WEIGHT: 189.8 LBS | SYSTOLIC BLOOD PRESSURE: 131 MMHG | HEIGHT: 63 IN | HEART RATE: 97 BPM | BODY MASS INDEX: 33.63 KG/M2 | TEMPERATURE: 98.1 F | DIASTOLIC BLOOD PRESSURE: 109 MMHG

## 2021-10-25 DIAGNOSIS — M25.471 ANKLE SWELLING, RIGHT: ICD-10-CM

## 2021-10-25 DIAGNOSIS — M79.7 FIBROMYALGIA: ICD-10-CM

## 2021-10-25 DIAGNOSIS — J01.91 ACUTE RECURRENT SINUSITIS, UNSPECIFIED LOCATION: ICD-10-CM

## 2021-10-25 DIAGNOSIS — D64.9 NORMOCYTIC ANEMIA: ICD-10-CM

## 2021-10-25 DIAGNOSIS — K21.9 GASTROESOPHAGEAL REFLUX DISEASE WITHOUT ESOPHAGITIS: ICD-10-CM

## 2021-10-25 DIAGNOSIS — I10 PRIMARY HYPERTENSION: ICD-10-CM

## 2021-10-25 LAB
ALBUMIN SERPL BCP-MCNC: 4.5 G/DL (ref 3.2–4.9)
ALBUMIN/GLOB SERPL: 1.4 G/DL
ALP SERPL-CCNC: 107 U/L (ref 30–99)
ALT SERPL-CCNC: 38 U/L (ref 2–50)
ANION GAP SERPL CALC-SCNC: 14 MMOL/L (ref 7–16)
AST SERPL-CCNC: 33 U/L (ref 12–45)
BASOPHILS # BLD AUTO: 0.6 % (ref 0–1.8)
BASOPHILS # BLD: 0.05 K/UL (ref 0–0.12)
BILIRUB SERPL-MCNC: 0.3 MG/DL (ref 0.1–1.5)
BUN SERPL-MCNC: 14 MG/DL (ref 8–22)
CALCIUM SERPL-MCNC: 10.1 MG/DL (ref 8.5–10.5)
CHLORIDE SERPL-SCNC: 102 MMOL/L (ref 96–112)
CO2 SERPL-SCNC: 19 MMOL/L (ref 20–33)
CREAT SERPL-MCNC: 0.91 MG/DL (ref 0.5–1.4)
CREAT UR-MCNC: 95.52 MG/DL
EOSINOPHIL # BLD AUTO: 0.07 K/UL (ref 0–0.51)
EOSINOPHIL NFR BLD: 0.8 % (ref 0–6.9)
ERYTHROCYTE [DISTWIDTH] IN BLOOD BY AUTOMATED COUNT: 52.1 FL (ref 35.9–50)
GLOBULIN SER CALC-MCNC: 3.2 G/DL (ref 1.9–3.5)
GLUCOSE SERPL-MCNC: 104 MG/DL (ref 65–99)
HCT VFR BLD AUTO: 42.8 % (ref 37–47)
HGB BLD-MCNC: 11.8 G/DL (ref 12–16)
IMM GRANULOCYTES # BLD AUTO: 0.07 K/UL (ref 0–0.11)
IMM GRANULOCYTES NFR BLD AUTO: 0.8 % (ref 0–0.9)
LYMPHOCYTES # BLD AUTO: 1.68 K/UL (ref 1–4.8)
LYMPHOCYTES NFR BLD: 19.2 % (ref 22–41)
MCH RBC QN AUTO: 21.7 PG (ref 27–33)
MCHC RBC AUTO-ENTMCNC: 27.6 G/DL (ref 33.6–35)
MCV RBC AUTO: 78.7 FL (ref 81.4–97.8)
MONOCYTES # BLD AUTO: 0.84 K/UL (ref 0–0.85)
MONOCYTES NFR BLD AUTO: 9.6 % (ref 0–13.4)
NEUTROPHILS # BLD AUTO: 6.02 K/UL (ref 2–7.15)
NEUTROPHILS NFR BLD: 69 % (ref 44–72)
NRBC # BLD AUTO: 0 K/UL
NRBC BLD-RTO: 0 /100 WBC
PHOSPHATE SERPL-MCNC: 4.2 MG/DL (ref 2.5–4.5)
PLATELET # BLD AUTO: 549 K/UL (ref 164–446)
PMV BLD AUTO: 10 FL (ref 9–12.9)
POTASSIUM SERPL-SCNC: 4.4 MMOL/L (ref 3.6–5.5)
POTASSIUM UR-SCNC: 33 MMOL/L
PROT SERPL-MCNC: 7.7 G/DL (ref 6–8.2)
RBC # BLD AUTO: 5.44 M/UL (ref 4.2–5.4)
SODIUM SERPL-SCNC: 135 MMOL/L (ref 135–145)
SODIUM UR-SCNC: 98 MMOL/L
WBC # BLD AUTO: 8.7 K/UL (ref 4.8–10.8)

## 2021-10-25 PROCEDURE — 80053 COMPREHEN METABOLIC PANEL: CPT

## 2021-10-25 PROCEDURE — 84133 ASSAY OF URINE POTASSIUM: CPT

## 2021-10-25 PROCEDURE — 99214 OFFICE O/P EST MOD 30 MIN: CPT | Mod: GC | Performed by: STUDENT IN AN ORGANIZED HEALTH CARE EDUCATION/TRAINING PROGRAM

## 2021-10-25 PROCEDURE — 82340 ASSAY OF CALCIUM IN URINE: CPT

## 2021-10-25 PROCEDURE — 84305 ASSAY OF SOMATOMEDIN: CPT

## 2021-10-25 PROCEDURE — 85025 COMPLETE CBC W/AUTO DIFF WBC: CPT

## 2021-10-25 PROCEDURE — 84105 ASSAY OF URINE PHOSPHORUS: CPT

## 2021-10-25 PROCEDURE — 36415 COLL VENOUS BLD VENIPUNCTURE: CPT

## 2021-10-25 PROCEDURE — 82570 ASSAY OF URINE CREATININE: CPT

## 2021-10-25 PROCEDURE — 84300 ASSAY OF URINE SODIUM: CPT

## 2021-10-25 PROCEDURE — 84100 ASSAY OF PHOSPHORUS: CPT

## 2021-10-25 RX ORDER — AMILORIDE HYDROCHLORIDE 5 MG/1
5 TABLET ORAL DAILY
Status: ON HOLD | COMMUNITY
End: 2021-12-29

## 2021-10-25 RX ORDER — GABAPENTIN 400 MG/1
400 CAPSULE ORAL 3 TIMES DAILY
Qty: 90 CAPSULE | Refills: 1 | Status: ON HOLD | OUTPATIENT
Start: 2021-10-25 | End: 2021-12-29

## 2021-10-25 RX ORDER — FAMOTIDINE 40 MG/1
40 TABLET, FILM COATED ORAL DAILY
COMMUNITY
End: 2021-10-25 | Stop reason: SDUPTHER

## 2021-10-25 RX ORDER — LOSARTAN POTASSIUM 100 MG/1
100 TABLET ORAL DAILY
COMMUNITY
End: 2021-10-25 | Stop reason: SDUPTHER

## 2021-10-25 RX ORDER — MAGNESIUM OXIDE 400 MG/1
400 TABLET ORAL DAILY
Qty: 30 TABLET | Refills: 2 | Status: ON HOLD | OUTPATIENT
Start: 2021-10-25 | End: 2021-12-29

## 2021-10-25 RX ORDER — LOSARTAN POTASSIUM 100 MG/1
100 TABLET ORAL DAILY
Qty: 30 TABLET | Refills: 2 | Status: ON HOLD | OUTPATIENT
Start: 2021-10-25 | End: 2021-12-29

## 2021-10-25 RX ORDER — DEXLANSOPRAZOLE 60 MG/1
60 CAPSULE, DELAYED RELEASE ORAL DAILY
Status: ON HOLD | COMMUNITY
End: 2021-12-29

## 2021-10-25 RX ORDER — CEVIMELINE HYDROCHLORIDE 30 MG/1
30 CAPSULE ORAL 2 TIMES DAILY
COMMUNITY
End: 2021-12-01

## 2021-10-25 RX ORDER — MELOXICAM 7.5 MG/1
7.5 TABLET ORAL DAILY
Qty: 30 TABLET | Refills: 4 | Status: ON HOLD | OUTPATIENT
Start: 2021-10-25 | End: 2021-12-29

## 2021-10-25 RX ORDER — EPINEPHRINE 1 MG/ML(1)
0.3 AMPUL (ML) INJECTION ONCE
Status: ON HOLD | COMMUNITY
End: 2021-12-29

## 2021-10-25 RX ORDER — HYDROXYZINE HYDROCHLORIDE 25 MG/1
25 TABLET, FILM COATED ORAL EVERY 6 HOURS PRN
Status: ON HOLD | COMMUNITY
End: 2021-12-29

## 2021-10-25 RX ORDER — DOXAZOSIN 2 MG/1
2 TABLET ORAL DAILY
COMMUNITY

## 2021-10-25 RX ORDER — FROVATRIPTAN SUCCINATE 2.5 MG/1
2.5 TABLET, FILM COATED ORAL
Status: ON HOLD | COMMUNITY
End: 2021-12-29

## 2021-10-25 RX ORDER — ESTRADIOL 0.5 MG/1
0.5 TABLET ORAL DAILY
Qty: 30 TABLET | Refills: 2 | Status: ON HOLD | OUTPATIENT
Start: 2021-10-25 | End: 2021-12-29

## 2021-10-25 RX ORDER — ESTRADIOL 0.5 MG/1
0.5 TABLET ORAL DAILY
COMMUNITY
End: 2021-10-25 | Stop reason: SDUPTHER

## 2021-10-25 RX ORDER — DEXAMETHASONE 0.5 MG/1
0.5 TABLET ORAL 2 TIMES DAILY
Status: ON HOLD | COMMUNITY
End: 2021-12-29

## 2021-10-25 RX ORDER — CHOLECALCIFEROL (VITAMIN D3) 50 MCG
2000 TABLET ORAL DAILY
Qty: 30 TABLET | Refills: 2 | Status: ON HOLD | OUTPATIENT
Start: 2021-10-25 | End: 2021-12-29

## 2021-10-25 RX ORDER — MONTELUKAST SODIUM 10 MG/1
10 TABLET ORAL EVERY EVENING
Qty: 30 TABLET | Refills: 2 | Status: SHIPPED | OUTPATIENT
Start: 2021-10-25

## 2021-10-25 RX ORDER — CETIRIZINE HYDROCHLORIDE 10 MG/1
10 TABLET ORAL
Qty: 30 TABLET | Refills: 2 | Status: ON HOLD | OUTPATIENT
Start: 2021-10-25 | End: 2021-12-29

## 2021-10-25 RX ORDER — PANTOPRAZOLE SODIUM 40 MG/1
40 TABLET, DELAYED RELEASE ORAL
COMMUNITY
Start: 2021-10-10 | End: 2021-10-25 | Stop reason: SDUPTHER

## 2021-10-25 RX ORDER — FAMOTIDINE 40 MG/1
40 TABLET, FILM COATED ORAL DAILY
Qty: 60 TABLET | Refills: 2 | Status: SHIPPED | OUTPATIENT
Start: 2021-10-25 | End: 2021-12-01

## 2021-10-25 RX ORDER — PANTOPRAZOLE SODIUM 40 MG/1
40 TABLET, DELAYED RELEASE ORAL DAILY
Qty: 30 TABLET | Refills: 2 | Status: SHIPPED | OUTPATIENT
Start: 2021-10-25 | End: 2021-11-02

## 2021-10-25 RX ORDER — MELOXICAM 15 MG/1
15 TABLET ORAL DAILY
COMMUNITY
End: 2021-10-25 | Stop reason: DRUGHIGH

## 2021-10-25 RX ORDER — IVABRADINE 5 MG/1
5 TABLET, FILM COATED ORAL
Status: ON HOLD | COMMUNITY
Start: 2021-10-11 | End: 2021-12-29

## 2021-10-25 RX ORDER — CYANOCOBALAMIN 1000 UG/ML
1000 INJECTION, SOLUTION INTRAMUSCULAR; SUBCUTANEOUS
Status: ON HOLD | COMMUNITY
End: 2021-12-29

## 2021-10-25 ASSESSMENT — FIBROSIS 4 INDEX: FIB4 SCORE: 0.92

## 2021-10-25 NOTE — PATIENT INSTRUCTIONS
Thank you for visiting today!  Please follow-up with your PCP in approximately 4-10 weeks  Try to decrease your estradiol as tolerated, start by decreasing 1 day at a time (i.e. 6 days this week, 5 the next, then 4 days a week, then down to 3 days a week and so on.)  Try compression stockings for lower extremity edema.  Try the lower dose of meloxicam if tolerated, to help lower irritation to your stomach.  Please follow-up with your cardiologist, endocrinologist, ENT doctor, and immunologist.  If you have any shortness of breath, swelling in your legs or arms, or unexplained fast heartbeat please seek medical attention immediately.
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
## 2021-10-26 NOTE — ASSESSMENT & PLAN NOTE
Patient recently seen by ENT, who are recommending possible operation  -We will continue to monitor  -Continue sertraline

## 2021-10-26 NOTE — ASSESSMENT & PLAN NOTE
Patient with echo improving, blood pressure 130 systolic at goal in office today  -continue to follow with cardiology  -Refilled losartan

## 2021-10-26 NOTE — ASSESSMENT & PLAN NOTE
Chronic issue since previous injury, right ankle swelling greater than left, nonpitting edema, worse in the morning, points to more likely chronic venous stasis over vascular versus congestive heart failure issue.  -Discussed elevation with patient, also encouraged use of compression stocking to help squeeze fluid back up.  -If patient still having pain and significant collections of fluid, could consider further imaging/ABIs in the future.

## 2021-10-26 NOTE — ASSESSMENT & PLAN NOTE
Likely multifactorial in origin, iron repletion but history of hemochromatosis?,  Hemoglobin 8.3 stable  -Continue to monitor

## 2021-10-26 NOTE — CARE PLAN
Problem: Bowel/Gastric:  Goal: Normal bowel function is maintained or improved  Outcome: PROGRESSING AS EXPECTED   Pt having regular bowel movements.     Problem: Pain Management  Goal: Pain level will decrease to patient's comfort goal  Outcome: PROGRESSING AS EXPECTED   Pt admitted with 10/10 chest pain, now complains of 2/10 pain   Need vit d 2,000-3,000 units/d

## 2021-10-27 ENCOUNTER — HOSPITAL ENCOUNTER (OUTPATIENT)
Facility: MEDICAL CENTER | Age: 56
End: 2021-10-27
Attending: OTOLARYNGOLOGY
Payer: MEDICARE

## 2021-10-27 LAB
CALCIUM 24H UR-MCNC: 4.5 MG/DL
CALCIUM 24H UR-MRATE: NORMAL MG/D
CALCIUM/CREAT 24H UR: 47 MG/G (ref 20–300)
COLLECT DURATION TIME SPEC: NORMAL HRS
COLLECT DURATION TIME SPEC: NORMAL HRS
CREAT 24H UR-MCNC: 95 MG/DL
CREAT 24H UR-MRATE: NORMAL MG/D (ref 500–1400)
IGF-I SERPL-MCNC: 179 NG/ML (ref 47–236)
IGF-I Z-SCORE SERPL: 1.1
PHOSPHATE 24H UR-MCNC: 82 MG/DL
PHOSPHATE 24H UR-MRATE: NORMAL MG/D (ref 400–1300)
PHOSPHATE/CREAT 24H UR: 863 MG/G
SPECIMEN VOL ?TM UR: NORMAL ML
TOTAL VOLUME 1105: NORMAL ML

## 2021-10-27 PROCEDURE — 87070 CULTURE OTHR SPECIMN AEROBIC: CPT

## 2021-10-27 PROCEDURE — 87075 CULTR BACTERIA EXCEPT BLOOD: CPT

## 2021-10-27 PROCEDURE — 87186 SC STD MICRODIL/AGAR DIL: CPT | Mod: 91

## 2021-10-27 PROCEDURE — 87077 CULTURE AEROBIC IDENTIFY: CPT

## 2021-10-27 PROCEDURE — 87205 SMEAR GRAM STAIN: CPT

## 2021-10-28 LAB
GRAM STN SPEC: NORMAL
SIGNIFICANT IND 70042: NORMAL
SITE SITE: NORMAL
SOURCE SOURCE: NORMAL

## 2021-11-05 ENCOUNTER — HOSPITAL ENCOUNTER (OUTPATIENT)
Dept: RADIOLOGY | Facility: MEDICAL CENTER | Age: 56
End: 2021-11-05
Attending: INTERNAL MEDICINE
Payer: MEDICARE

## 2021-11-05 ENCOUNTER — HOSPITAL ENCOUNTER (OUTPATIENT)
Dept: RADIOLOGY | Facility: MEDICAL CENTER | Age: 56
End: 2021-11-05
Attending: STUDENT IN AN ORGANIZED HEALTH CARE EDUCATION/TRAINING PROGRAM
Payer: MEDICARE

## 2021-11-05 ENCOUNTER — HOSPITAL ENCOUNTER (OUTPATIENT)
Dept: LAB | Facility: MEDICAL CENTER | Age: 56
End: 2021-11-05
Attending: INTERNAL MEDICINE
Payer: MEDICARE

## 2021-11-05 DIAGNOSIS — Z12.31 VISIT FOR SCREENING MAMMOGRAM: ICD-10-CM

## 2021-11-05 DIAGNOSIS — J01.11 ACUTE RECURRENT FRONTAL SINUSITIS: ICD-10-CM

## 2021-11-05 LAB
ALBUMIN SERPL BCP-MCNC: 4.1 G/DL (ref 3.2–4.9)
ALBUMIN/GLOB SERPL: 1.5 G/DL
ALP SERPL-CCNC: 93 U/L (ref 30–99)
ALT SERPL-CCNC: 26 U/L (ref 2–50)
ANION GAP SERPL CALC-SCNC: 15 MMOL/L (ref 7–16)
ANISOCYTOSIS BLD QL SMEAR: ABNORMAL
AST SERPL-CCNC: 22 U/L (ref 12–45)
BASOPHILS # BLD AUTO: 0.2 % (ref 0–1.8)
BASOPHILS # BLD: 0.02 K/UL (ref 0–0.12)
BILIRUB SERPL-MCNC: 0.2 MG/DL (ref 0.1–1.5)
BUN SERPL-MCNC: 12 MG/DL (ref 8–22)
CALCIUM SERPL-MCNC: 9.1 MG/DL (ref 8.5–10.5)
CHLORIDE SERPL-SCNC: 103 MMOL/L (ref 96–112)
CO2 SERPL-SCNC: 21 MMOL/L (ref 20–33)
COMMENT 1642: NORMAL
CREAT SERPL-MCNC: 0.79 MG/DL (ref 0.5–1.4)
CRP SERPL HS-MCNC: <0.3 MG/DL (ref 0–0.75)
DACRYOCYTES BLD QL SMEAR: NORMAL
EOSINOPHIL # BLD AUTO: 0.03 K/UL (ref 0–0.51)
EOSINOPHIL NFR BLD: 0.3 % (ref 0–6.9)
ERYTHROCYTE [DISTWIDTH] IN BLOOD BY AUTOMATED COUNT: 50.8 FL (ref 35.9–50)
ERYTHROCYTE [SEDIMENTATION RATE] IN BLOOD BY WESTERGREN METHOD: 14 MM/HOUR (ref 0–25)
GIANT PLATELETS BLD QL SMEAR: NORMAL
GLOBULIN SER CALC-MCNC: 2.8 G/DL (ref 1.9–3.5)
GLUCOSE SERPL-MCNC: 103 MG/DL (ref 65–99)
HCT VFR BLD AUTO: 37.3 % (ref 37–47)
HGB BLD-MCNC: 10 G/DL (ref 12–16)
HYPOCHROMIA BLD QL SMEAR: ABNORMAL
IMM GRANULOCYTES # BLD AUTO: 0.05 K/UL (ref 0–0.11)
IMM GRANULOCYTES NFR BLD AUTO: 0.5 % (ref 0–0.9)
LYMPHOCYTES # BLD AUTO: 0.62 K/UL (ref 1–4.8)
LYMPHOCYTES NFR BLD: 6.1 % (ref 22–41)
MCH RBC QN AUTO: 20.5 PG (ref 27–33)
MCHC RBC AUTO-ENTMCNC: 26.8 G/DL (ref 33.6–35)
MCV RBC AUTO: 76.6 FL (ref 81.4–97.8)
MICROCYTES BLD QL SMEAR: ABNORMAL
MONOCYTES # BLD AUTO: 0.51 K/UL (ref 0–0.85)
MONOCYTES NFR BLD AUTO: 5 % (ref 0–13.4)
MORPHOLOGY BLD-IMP: NORMAL
NEUTROPHILS # BLD AUTO: 8.88 K/UL (ref 2–7.15)
NEUTROPHILS NFR BLD: 87.9 % (ref 44–72)
NRBC # BLD AUTO: 0.02 K/UL
NRBC BLD-RTO: 0.2 /100 WBC
OVALOCYTES BLD QL SMEAR: NORMAL
PLATELET # BLD AUTO: 381 K/UL (ref 164–446)
PLATELET BLD QL SMEAR: NORMAL
PMV BLD AUTO: 10 FL (ref 9–12.9)
POIKILOCYTOSIS BLD QL SMEAR: NORMAL
POLYCHROMASIA BLD QL SMEAR: NORMAL
POTASSIUM SERPL-SCNC: 4.1 MMOL/L (ref 3.6–5.5)
PROT SERPL-MCNC: 6.9 G/DL (ref 6–8.2)
RBC # BLD AUTO: 4.87 M/UL (ref 4.2–5.4)
RBC BLD AUTO: PRESENT
SCHISTOCYTES BLD QL SMEAR: NORMAL
SODIUM SERPL-SCNC: 139 MMOL/L (ref 135–145)
WBC # BLD AUTO: 10.1 K/UL (ref 4.8–10.8)

## 2021-11-05 PROCEDURE — 85025 COMPLETE CBC W/AUTO DIFF WBC: CPT

## 2021-11-05 PROCEDURE — 36415 COLL VENOUS BLD VENIPUNCTURE: CPT

## 2021-11-05 PROCEDURE — 70486 CT MAXILLOFACIAL W/O DYE: CPT | Mod: MH

## 2021-11-05 PROCEDURE — 77063 BREAST TOMOSYNTHESIS BI: CPT

## 2021-11-05 PROCEDURE — 86140 C-REACTIVE PROTEIN: CPT

## 2021-11-05 PROCEDURE — 85652 RBC SED RATE AUTOMATED: CPT

## 2021-11-05 PROCEDURE — 80053 COMPREHEN METABOLIC PANEL: CPT

## 2021-11-08 ENCOUNTER — HOSPITAL ENCOUNTER (OUTPATIENT)
Dept: RADIOLOGY | Facility: MEDICAL CENTER | Age: 56
End: 2021-11-08
Attending: INTERNAL MEDICINE
Payer: MEDICARE

## 2021-11-08 DIAGNOSIS — Z79.2 ENCOUNTER FOR LONG-TERM (CURRENT) USE OF ANTIBIOTICS: ICD-10-CM

## 2021-11-08 DIAGNOSIS — L03.90 CELLULITIS DUE TO MRSA: ICD-10-CM

## 2021-11-08 DIAGNOSIS — B95.62 CELLULITIS DUE TO MRSA: ICD-10-CM

## 2021-11-08 DIAGNOSIS — J01.80 OTHER ACUTE SINUSITIS, RECURRENCE NOT SPECIFIED: ICD-10-CM

## 2021-11-08 PROCEDURE — 36573 INSJ PICC RS&I 5 YR+: CPT

## 2021-11-08 NOTE — PROCEDURES
PICC Insertion Final 3CG    Consents confirmed, vessel patency confirmed with ultrasound. Risks and benefits of procedure explained to patient/family and education regarding central line associated bloodstream infections provided. Questions answered.     PICC placed in LUE per MD order with ultrasound guidance. 4 Fr, single lumen PICC placed in basilic vein after one attempt(s). 4 cc's of 1% lidocaine injected intradermally, 21 gauge microintroducer needle and modified Seldinger technique used. 48 cm catheter inserted with good blood return. Secured at 2 cm marker. Each lumen flushed without resistance with 10 mL 0.9% normal saline. PICC line secured with Biopatch and Tegaderm.    PICC placement is confirmed by nurse using 3CG technology. PICC line is appropriate for use at this time. Pt tolerated procedure well.  Patient condition relayed to unit RN or ordering physician via this post procedure note in the EMR.     [employer] Power PICC ref #[id number], Lot # [id number]

## 2021-11-09 ENCOUNTER — HOSPITAL ENCOUNTER (OUTPATIENT)
Facility: MEDICAL CENTER | Age: 56
End: 2021-11-09
Attending: INTERNAL MEDICINE
Payer: MEDICARE

## 2021-11-09 PROCEDURE — 86741 NEISSERIA MENINGITIDIS: CPT

## 2021-11-09 PROCEDURE — 86317 IMMUNOASSAY INFECTIOUS AGENT: CPT | Mod: 91

## 2021-11-11 ENCOUNTER — HOSPITAL ENCOUNTER (OUTPATIENT)
Facility: MEDICAL CENTER | Age: 56
End: 2021-11-11
Attending: INTERNAL MEDICINE
Payer: MEDICARE

## 2021-11-11 ENCOUNTER — PHARMACY VISIT (OUTPATIENT)
Dept: PHARMACY | Facility: MEDICAL CENTER | Age: 56
End: 2021-11-11
Payer: COMMERCIAL

## 2021-11-11 LAB
APPEARANCE UR: CLEAR
BILIRUB UR QL STRIP.AUTO: NEGATIVE
COLOR UR: YELLOW
GLUCOSE UR STRIP.AUTO-MCNC: NEGATIVE MG/DL
KETONES UR STRIP.AUTO-MCNC: ABNORMAL MG/DL
LEUKOCYTE ESTERASE UR QL STRIP.AUTO: NEGATIVE
MICRO URNS: ABNORMAL
NITRITE UR QL STRIP.AUTO: NEGATIVE
PH UR STRIP.AUTO: 5.5 [PH] (ref 5–8)
PROT UR QL STRIP: NEGATIVE MG/DL
RBC UR QL AUTO: NEGATIVE
SP GR UR STRIP.AUTO: 1.02
UROBILINOGEN UR STRIP.AUTO-MCNC: 0.2 MG/DL

## 2021-11-11 PROCEDURE — 81003 URINALYSIS AUTO W/O SCOPE: CPT

## 2021-11-11 PROCEDURE — RXMED WILLOW AMBULATORY MEDICATION CHARGE: Performed by: INTERNAL MEDICINE

## 2021-11-11 PROCEDURE — 87086 URINE CULTURE/COLONY COUNT: CPT

## 2021-11-11 RX ORDER — HYDROXYZINE HYDROCHLORIDE 10 MG/1
TABLET, FILM COATED ORAL
Qty: 30 TABLET | Refills: 0 | Status: SHIPPED | OUTPATIENT
Start: 2021-11-11 | End: 2021-12-01

## 2021-11-12 LAB
C DIPHTHERIAE IGG SER-ACNC: >10 IU/ML
C TETANI TOXOID IGG SERPL IA-ACNC: 3 IU/ML
HAEM INFLU B IGG SER-MCNC: 0 UG/ML

## 2021-11-13 LAB
BACTERIA UR CULT: NORMAL
SIGNIFICANT IND 70042: NORMAL
SITE SITE: NORMAL
SOURCE SOURCE: NORMAL

## 2021-11-14 LAB
S PN DA SERO 19F IGG SER-MCNC: 9.87 UG/ML
S PNEUM DA 1 IGG SER-MCNC: 1.6 UG/ML
S PNEUM DA 12F IGG SER-MCNC: 0.13 UG/ML
S PNEUM DA 14 IGG SER-MCNC: 1.19 UG/ML
S PNEUM DA 18C IGG SER-MCNC: 1.48 UG/ML
S PNEUM DA 23F IGG SER-MCNC: 10.91 UG/ML
S PNEUM DA 3 IGG SER-MCNC: 0.63 UG/ML
S PNEUM DA 4 IGG SER-MCNC: 0.3 UG/ML
S PNEUM DA 5 IGG SER-MCNC: 6.34 UG/ML
S PNEUM DA 6B IGG SER-MCNC: 0.91 UG/ML
S PNEUM DA 7F IGG SER-MCNC: 27.73 UG/ML
S PNEUM DA 8 IGG SER-MCNC: 2.07 UG/ML
S PNEUM DA 9N IGG SER-MCNC: 0.76 UG/ML
S PNEUM DA 9V IGG SER-MCNC: 0.44 UG/ML
S PNEUM SEROTYPE IGG SER-IMP: NORMAL

## 2021-11-15 ENCOUNTER — HOSPITAL ENCOUNTER (OUTPATIENT)
Facility: MEDICAL CENTER | Age: 56
End: 2021-11-15
Attending: INTERNAL MEDICINE
Payer: MEDICARE

## 2021-11-15 LAB
ALBUMIN SERPL BCP-MCNC: 3.7 G/DL (ref 3.2–4.9)
ALBUMIN/GLOB SERPL: 1.6 G/DL
ALP SERPL-CCNC: 132 U/L (ref 30–99)
ALT SERPL-CCNC: 38 U/L (ref 2–50)
ANION GAP SERPL CALC-SCNC: 12 MMOL/L (ref 7–16)
ANISOCYTOSIS BLD QL SMEAR: ABNORMAL
AST SERPL-CCNC: 43 U/L (ref 12–45)
BASOPHILS # BLD AUTO: 0.4 % (ref 0–1.8)
BASOPHILS # BLD: 0.03 K/UL (ref 0–0.12)
BILIRUB SERPL-MCNC: <0.2 MG/DL (ref 0.1–1.5)
BUN SERPL-MCNC: 18 MG/DL (ref 8–22)
CALCIUM SERPL-MCNC: 8.6 MG/DL (ref 8.5–10.5)
CHLORIDE SERPL-SCNC: 110 MMOL/L (ref 96–112)
CO2 SERPL-SCNC: 17 MMOL/L (ref 20–33)
COMMENT 1642: NORMAL
CREAT SERPL-MCNC: 0.83 MG/DL (ref 0.5–1.4)
CRP SERPL HS-MCNC: <0.3 MG/DL (ref 0–0.75)
EOSINOPHIL # BLD AUTO: 0.01 K/UL (ref 0–0.51)
EOSINOPHIL NFR BLD: 0.1 % (ref 0–6.9)
ERYTHROCYTE [DISTWIDTH] IN BLOOD BY AUTOMATED COUNT: 50.7 FL (ref 35.9–50)
ERYTHROCYTE [SEDIMENTATION RATE] IN BLOOD BY WESTERGREN METHOD: 4 MM/HOUR (ref 0–25)
GLOBULIN SER CALC-MCNC: 2.3 G/DL (ref 1.9–3.5)
GLUCOSE SERPL-MCNC: 80 MG/DL (ref 65–99)
HCT VFR BLD AUTO: 37.4 % (ref 37–47)
HGB BLD-MCNC: 10.5 G/DL (ref 12–16)
HYPOCHROMIA BLD QL SMEAR: ABNORMAL
IMM GRANULOCYTES # BLD AUTO: 0.03 K/UL (ref 0–0.11)
IMM GRANULOCYTES NFR BLD AUTO: 0.4 % (ref 0–0.9)
LYMPHOCYTES # BLD AUTO: 0.63 K/UL (ref 1–4.8)
LYMPHOCYTES NFR BLD: 9.1 % (ref 22–41)
MCH RBC QN AUTO: 20.7 PG (ref 27–33)
MCHC RBC AUTO-ENTMCNC: 28.1 G/DL (ref 33.6–35)
MCV RBC AUTO: 73.6 FL (ref 81.4–97.8)
MICROCYTES BLD QL SMEAR: ABNORMAL
MONOCYTES # BLD AUTO: 0.59 K/UL (ref 0–0.85)
MONOCYTES NFR BLD AUTO: 8.5 % (ref 0–13.4)
MORPHOLOGY BLD-IMP: NORMAL
N MEN SG A IGG SER IA-MCNC: 142 UG/ML
N MEN SG C IGG SER IA-MCNC: 6.2 UG/ML
N MEN SG W135 IGG SER IA-MCNC: 1.2 UG/ML
N MEN SG Y IGG SER IA-MCNC: 7.4 UG/ML
NEUTROPHILS # BLD AUTO: 5.65 K/UL (ref 2–7.15)
NEUTROPHILS NFR BLD: 81.5 % (ref 44–72)
NRBC # BLD AUTO: 0 K/UL
NRBC BLD-RTO: 0 /100 WBC
OVALOCYTES BLD QL SMEAR: NORMAL
PLATELET # BLD AUTO: 252 K/UL (ref 164–446)
PLATELET BLD QL SMEAR: NORMAL
PMV BLD AUTO: 10 FL (ref 9–12.9)
POIKILOCYTOSIS BLD QL SMEAR: NORMAL
POLYCHROMASIA BLD QL SMEAR: NORMAL
POTASSIUM SERPL-SCNC: 4.6 MMOL/L (ref 3.6–5.5)
PROT SERPL-MCNC: 6 G/DL (ref 6–8.2)
RBC # BLD AUTO: 5.08 M/UL (ref 4.2–5.4)
RBC BLD AUTO: PRESENT
SODIUM SERPL-SCNC: 139 MMOL/L (ref 135–145)
TARGETS BLD QL SMEAR: NORMAL
WBC # BLD AUTO: 6.9 K/UL (ref 4.8–10.8)

## 2021-11-15 PROCEDURE — 85025 COMPLETE CBC W/AUTO DIFF WBC: CPT

## 2021-11-15 PROCEDURE — 80053 COMPREHEN METABOLIC PANEL: CPT

## 2021-11-15 PROCEDURE — 86140 C-REACTIVE PROTEIN: CPT

## 2021-11-15 PROCEDURE — 85652 RBC SED RATE AUTOMATED: CPT

## 2021-11-23 ENCOUNTER — HOSPITAL ENCOUNTER (OUTPATIENT)
Facility: MEDICAL CENTER | Age: 56
End: 2021-11-23
Attending: INTERNAL MEDICINE
Payer: MEDICARE

## 2021-11-23 PROCEDURE — 85025 COMPLETE CBC W/AUTO DIFF WBC: CPT

## 2021-11-23 PROCEDURE — 80053 COMPREHEN METABOLIC PANEL: CPT

## 2021-11-23 PROCEDURE — 86140 C-REACTIVE PROTEIN: CPT

## 2021-11-23 PROCEDURE — 85652 RBC SED RATE AUTOMATED: CPT

## 2021-11-24 LAB
ALBUMIN SERPL BCP-MCNC: 2.9 G/DL (ref 3.2–4.9)
ALBUMIN/GLOB SERPL: 1.3 G/DL
ALP SERPL-CCNC: 230 U/L (ref 30–99)
ALT SERPL-CCNC: 73 U/L (ref 2–50)
ANION GAP SERPL CALC-SCNC: 8 MMOL/L (ref 7–16)
ANISOCYTOSIS BLD QL SMEAR: ABNORMAL
AST SERPL-CCNC: 75 U/L (ref 12–45)
BASOPHILS # BLD AUTO: 0.1 % (ref 0–1.8)
BASOPHILS # BLD: 0.01 K/UL (ref 0–0.12)
BILIRUB SERPL-MCNC: 0.3 MG/DL (ref 0.1–1.5)
BUN SERPL-MCNC: 31 MG/DL (ref 8–22)
BURR CELLS BLD QL SMEAR: NORMAL
CALCIUM SERPL-MCNC: 8 MG/DL (ref 8.5–10.5)
CHLORIDE SERPL-SCNC: 104 MMOL/L (ref 96–112)
CO2 SERPL-SCNC: 22 MMOL/L (ref 20–33)
COMMENT 1642: NORMAL
CREAT SERPL-MCNC: 1.04 MG/DL (ref 0.5–1.4)
CRP SERPL HS-MCNC: 0.67 MG/DL (ref 0–0.75)
EOSINOPHIL # BLD AUTO: 0.01 K/UL (ref 0–0.51)
EOSINOPHIL NFR BLD: 0.1 % (ref 0–6.9)
ERYTHROCYTE [DISTWIDTH] IN BLOOD BY AUTOMATED COUNT: 54.6 FL (ref 35.9–50)
ERYTHROCYTE [SEDIMENTATION RATE] IN BLOOD BY WESTERGREN METHOD: 4 MM/HOUR (ref 0–25)
GLOBULIN SER CALC-MCNC: 2.2 G/DL (ref 1.9–3.5)
GLUCOSE SERPL-MCNC: 102 MG/DL (ref 65–99)
HCT VFR BLD AUTO: 40.4 % (ref 37–47)
HGB BLD-MCNC: 11.2 G/DL (ref 12–16)
HYPOCHROMIA BLD QL SMEAR: ABNORMAL
IMM GRANULOCYTES # BLD AUTO: 0.06 K/UL (ref 0–0.11)
IMM GRANULOCYTES NFR BLD AUTO: 0.8 % (ref 0–0.9)
LYMPHOCYTES # BLD AUTO: 0.77 K/UL (ref 1–4.8)
LYMPHOCYTES NFR BLD: 9.9 % (ref 22–41)
MCH RBC QN AUTO: 20.6 PG (ref 27–33)
MCHC RBC AUTO-ENTMCNC: 27.7 G/DL (ref 33.6–35)
MCV RBC AUTO: 74.1 FL (ref 81.4–97.8)
MICROCYTES BLD QL SMEAR: ABNORMAL
MONOCYTES # BLD AUTO: 0.92 K/UL (ref 0–0.85)
MONOCYTES NFR BLD AUTO: 11.8 % (ref 0–13.4)
MORPHOLOGY BLD-IMP: NORMAL
NEUTROPHILS # BLD AUTO: 6 K/UL (ref 2–7.15)
NEUTROPHILS NFR BLD: 77.3 % (ref 44–72)
NRBC # BLD AUTO: 0 K/UL
NRBC BLD-RTO: 0 /100 WBC
OVALOCYTES BLD QL SMEAR: NORMAL
PLATELET # BLD AUTO: 211 K/UL (ref 164–446)
PLATELET BLD QL SMEAR: NORMAL
POIKILOCYTOSIS BLD QL SMEAR: NORMAL
POTASSIUM SERPL-SCNC: 6.2 MMOL/L (ref 3.6–5.5)
PROT SERPL-MCNC: 5.1 G/DL (ref 6–8.2)
RBC # BLD AUTO: 5.45 M/UL (ref 4.2–5.4)
RBC BLD AUTO: PRESENT
SODIUM SERPL-SCNC: 134 MMOL/L (ref 135–145)
WBC # BLD AUTO: 7.8 K/UL (ref 4.8–10.8)

## 2021-11-29 ENCOUNTER — HOSPITAL ENCOUNTER (OUTPATIENT)
Facility: MEDICAL CENTER | Age: 56
DRG: 981 | End: 2021-11-29
Attending: INTERNAL MEDICINE
Payer: MEDICARE

## 2021-11-29 ENCOUNTER — HOSPITAL ENCOUNTER (OUTPATIENT)
Facility: MEDICAL CENTER | Age: 56
DRG: 981 | End: 2021-11-29
Attending: NURSE PRACTITIONER
Payer: MEDICARE

## 2021-11-29 LAB
ALBUMIN SERPL BCP-MCNC: 2.5 G/DL (ref 3.2–4.9)
ALBUMIN/GLOB SERPL: 1.4 G/DL
ALP SERPL-CCNC: 173 U/L (ref 30–99)
ALT SERPL-CCNC: 94 U/L (ref 2–50)
ANION GAP SERPL CALC-SCNC: 8 MMOL/L (ref 7–16)
ANISOCYTOSIS BLD QL SMEAR: ABNORMAL
AST SERPL-CCNC: 64 U/L (ref 12–45)
BASOPHILS # BLD AUTO: 0 % (ref 0–1.8)
BASOPHILS # BLD: 0 K/UL (ref 0–0.12)
BILIRUB SERPL-MCNC: 0.4 MG/DL (ref 0.1–1.5)
BUN SERPL-MCNC: 23 MG/DL (ref 8–22)
BURR CELLS BLD QL SMEAR: NORMAL
CALCIUM SERPL-MCNC: 7.9 MG/DL (ref 8.5–10.5)
CHLORIDE SERPL-SCNC: 106 MMOL/L (ref 96–112)
CO2 SERPL-SCNC: 17 MMOL/L (ref 20–33)
CREAT SERPL-MCNC: 0.77 MG/DL (ref 0.5–1.4)
CRP SERPL HS-MCNC: <0.3 MG/DL (ref 0–0.75)
EOSINOPHIL # BLD AUTO: 0 K/UL (ref 0–0.51)
EOSINOPHIL NFR BLD: 0 % (ref 0–6.9)
ERYTHROCYTE [DISTWIDTH] IN BLOOD BY AUTOMATED COUNT: 59.1 FL (ref 35.9–50)
ERYTHROCYTE [SEDIMENTATION RATE] IN BLOOD BY WESTERGREN METHOD: 5 MM/HOUR (ref 0–25)
GLOBULIN SER CALC-MCNC: 1.8 G/DL (ref 1.9–3.5)
GLUCOSE SERPL-MCNC: 168 MG/DL (ref 65–99)
HCT VFR BLD AUTO: 37.7 % (ref 37–47)
HGB BLD-MCNC: 10.6 G/DL (ref 12–16)
LYMPHOCYTES # BLD AUTO: 0.48 K/UL (ref 1–4.8)
LYMPHOCYTES NFR BLD: 4.4 % (ref 22–41)
MACROCYTES BLD QL SMEAR: ABNORMAL
MANUAL DIFF BLD: NORMAL
MCH RBC QN AUTO: 20.9 PG (ref 27–33)
MCHC RBC AUTO-ENTMCNC: 28.1 G/DL (ref 33.6–35)
MCV RBC AUTO: 74.2 FL (ref 81.4–97.8)
MONOCYTES # BLD AUTO: 0.58 K/UL (ref 0–0.85)
MONOCYTES NFR BLD AUTO: 5.3 % (ref 0–13.4)
MORPHOLOGY BLD-IMP: NORMAL
NEUTROPHILS # BLD AUTO: 9.93 K/UL (ref 2–7.15)
NEUTROPHILS NFR BLD: 90.3 % (ref 44–72)
NRBC # BLD AUTO: 0 K/UL
NRBC BLD-RTO: 0 /100 WBC
OVALOCYTES BLD QL SMEAR: NORMAL
PLATELET # BLD AUTO: 227 K/UL (ref 164–446)
PLATELET BLD QL SMEAR: NORMAL
PMV BLD AUTO: 10.7 FL (ref 9–12.9)
POIKILOCYTOSIS BLD QL SMEAR: NORMAL
POTASSIUM SERPL-SCNC: 4.7 MMOL/L (ref 3.6–5.5)
PROT SERPL-MCNC: 4.3 G/DL (ref 6–8.2)
RBC # BLD AUTO: 5.08 M/UL (ref 4.2–5.4)
RBC BLD AUTO: PRESENT
SODIUM SERPL-SCNC: 131 MMOL/L (ref 135–145)
WBC # BLD AUTO: 11 K/UL (ref 4.8–10.8)

## 2021-11-29 PROCEDURE — 86140 C-REACTIVE PROTEIN: CPT

## 2021-11-29 PROCEDURE — 85652 RBC SED RATE AUTOMATED: CPT

## 2021-11-29 PROCEDURE — 85027 COMPLETE CBC AUTOMATED: CPT

## 2021-11-29 PROCEDURE — RXMED WILLOW AMBULATORY MEDICATION CHARGE: Performed by: INTERNAL MEDICINE

## 2021-11-29 PROCEDURE — 80053 COMPREHEN METABOLIC PANEL: CPT

## 2021-11-29 PROCEDURE — 85007 BL SMEAR W/DIFF WBC COUNT: CPT

## 2021-11-29 PROCEDURE — 87493 C DIFF AMPLIFIED PROBE: CPT

## 2021-11-30 ENCOUNTER — PHARMACY VISIT (OUTPATIENT)
Dept: PHARMACY | Facility: MEDICAL CENTER | Age: 56
End: 2021-11-30
Payer: COMMERCIAL

## 2021-11-30 LAB
C DIFF DNA SPEC QL NAA+PROBE: NEGATIVE
C DIFF TOX GENS STL QL NAA+PROBE: NEGATIVE

## 2021-12-01 ENCOUNTER — APPOINTMENT (OUTPATIENT)
Dept: RADIOLOGY | Facility: MEDICAL CENTER | Age: 56
DRG: 981 | End: 2021-12-01
Attending: EMERGENCY MEDICINE
Payer: MEDICARE

## 2021-12-01 ENCOUNTER — HOSPITAL ENCOUNTER (INPATIENT)
Facility: MEDICAL CENTER | Age: 56
LOS: 28 days | DRG: 981 | End: 2021-12-29
Attending: EMERGENCY MEDICINE | Admitting: INTERNAL MEDICINE
Payer: MEDICARE

## 2021-12-01 DIAGNOSIS — I72.8 SPLENIC ARTERY ANEURYSM (HCC): ICD-10-CM

## 2021-12-01 DIAGNOSIS — I26.99 ACUTE PULMONARY EMBOLISM WITHOUT ACUTE COR PULMONALE, UNSPECIFIED PULMONARY EMBOLISM TYPE (HCC): ICD-10-CM

## 2021-12-01 DIAGNOSIS — I26.99 PE (PULMONARY THROMBOEMBOLISM) (HCC): ICD-10-CM

## 2021-12-01 DIAGNOSIS — I82.622 ACUTE DEEP VEIN THROMBOSIS (DVT) OF LEFT UPPER EXTREMITY, UNSPECIFIED VEIN (HCC): ICD-10-CM

## 2021-12-01 DIAGNOSIS — D65 DIC (DISSEMINATED INTRAVASCULAR COAGULATION) (HCC): ICD-10-CM

## 2021-12-01 DIAGNOSIS — I26.09 CHRONIC PULMONARY EMBOLISM WITH ACUTE COR PULMONALE, UNSPECIFIED PULMONARY EMBOLISM TYPE (HCC): ICD-10-CM

## 2021-12-01 DIAGNOSIS — I46.9 CARDIAC ARREST (HCC): ICD-10-CM

## 2021-12-01 DIAGNOSIS — K85.90 ACUTE PANCREATITIS WITHOUT INFECTION OR NECROSIS, UNSPECIFIED PANCREATITIS TYPE: ICD-10-CM

## 2021-12-01 DIAGNOSIS — I82.402 ACUTE DEEP VEIN THROMBOSIS (DVT) OF LEFT LOWER EXTREMITY, UNSPECIFIED VEIN (HCC): ICD-10-CM

## 2021-12-01 DIAGNOSIS — R19.7 DIARRHEA, UNSPECIFIED TYPE: ICD-10-CM

## 2021-12-01 DIAGNOSIS — R10.32 LEFT LOWER QUADRANT PAIN: ICD-10-CM

## 2021-12-01 DIAGNOSIS — D62 ACUTE BLOOD LOSS ANEMIA: ICD-10-CM

## 2021-12-01 DIAGNOSIS — R57.8 HEMORRHAGIC SHOCK (HCC): ICD-10-CM

## 2021-12-01 DIAGNOSIS — I27.82 CHRONIC PULMONARY EMBOLISM WITH ACUTE COR PULMONALE, UNSPECIFIED PULMONARY EMBOLISM TYPE (HCC): ICD-10-CM

## 2021-12-01 DIAGNOSIS — R57.9 SHOCK (HCC): ICD-10-CM

## 2021-12-01 DIAGNOSIS — J96.01 ACUTE RESPIRATORY FAILURE WITH HYPOXIA (HCC): Primary | ICD-10-CM

## 2021-12-01 PROBLEM — J32.9 SINUSITIS: Status: ACTIVE | Noted: 2021-04-27

## 2021-12-01 PROBLEM — R10.13 EPIGASTRIC PAIN: Status: ACTIVE | Noted: 2021-12-01

## 2021-12-01 LAB
ALBUMIN SERPL BCP-MCNC: 3.6 G/DL (ref 3.2–4.9)
ALBUMIN/GLOB SERPL: 2 G/DL
ALP SERPL-CCNC: 226 U/L (ref 30–99)
ALT SERPL-CCNC: 117 U/L (ref 2–50)
ANION GAP SERPL CALC-SCNC: 11 MMOL/L (ref 7–16)
APPEARANCE UR: ABNORMAL
APTT PPP: 28.8 SEC (ref 24.7–36)
APTT PPP: 31 SEC (ref 24.7–36)
AST SERPL-CCNC: 72 U/L (ref 12–45)
BACTERIA #/AREA URNS HPF: ABNORMAL /HPF
BASOPHILS # BLD AUTO: 0.2 % (ref 0–1.8)
BASOPHILS # BLD: 0.04 K/UL (ref 0–0.12)
BILIRUB SERPL-MCNC: 0.6 MG/DL (ref 0.1–1.5)
BILIRUB UR QL STRIP.AUTO: NEGATIVE
BUN SERPL-MCNC: 27 MG/DL (ref 8–22)
CALCIUM SERPL-MCNC: 8.7 MG/DL (ref 8.5–10.5)
CAOX CRY #/AREA URNS HPF: ABNORMAL /HPF
CHLORIDE SERPL-SCNC: 105 MMOL/L (ref 96–112)
CO2 SERPL-SCNC: 19 MMOL/L (ref 20–33)
COLOR UR: ABNORMAL
CORTIS SERPL-MCNC: 6.9 UG/DL (ref 0–23)
CREAT SERPL-MCNC: 0.91 MG/DL (ref 0.5–1.4)
EKG IMPRESSION: NORMAL
EOSINOPHIL # BLD AUTO: 0.03 K/UL (ref 0–0.51)
EOSINOPHIL NFR BLD: 0.2 % (ref 0–6.9)
EPI CELLS #/AREA URNS HPF: ABNORMAL /HPF
ERYTHROCYTE [DISTWIDTH] IN BLOOD BY AUTOMATED COUNT: 59.6 FL (ref 35.9–50)
GLOBULIN SER CALC-MCNC: 1.8 G/DL (ref 1.9–3.5)
GLUCOSE BLD-MCNC: 81 MG/DL (ref 65–99)
GLUCOSE SERPL-MCNC: 106 MG/DL (ref 65–99)
GLUCOSE UR STRIP.AUTO-MCNC: NEGATIVE MG/DL
HCT VFR BLD AUTO: 45.2 % (ref 37–47)
HGB BLD-MCNC: 12.6 G/DL (ref 12–16)
HYALINE CASTS #/AREA URNS LPF: ABNORMAL /LPF
IMM GRANULOCYTES # BLD AUTO: 0.13 K/UL (ref 0–0.11)
IMM GRANULOCYTES NFR BLD AUTO: 0.7 % (ref 0–0.9)
INR PPP: 1.26 (ref 0.87–1.13)
INR PPP: 1.35 (ref 0.87–1.13)
KETONES UR STRIP.AUTO-MCNC: NEGATIVE MG/DL
LACTATE BLD-SCNC: 2.3 MMOL/L (ref 0.5–2)
LACTATE BLD-SCNC: 2.6 MMOL/L (ref 0.5–2)
LACTATE BLD-SCNC: 3 MMOL/L (ref 0.5–2)
LEUKOCYTE ESTERASE UR QL STRIP.AUTO: ABNORMAL
LIPASE SERPL-CCNC: 1275 U/L (ref 11–82)
LYMPHOCYTES # BLD AUTO: 1.32 K/UL (ref 1–4.8)
LYMPHOCYTES NFR BLD: 7.6 % (ref 22–41)
MCH RBC QN AUTO: 20.6 PG (ref 27–33)
MCHC RBC AUTO-ENTMCNC: 27.9 G/DL (ref 33.6–35)
MCV RBC AUTO: 73.7 FL (ref 81.4–97.8)
MICRO URNS: ABNORMAL
MONOCYTES # BLD AUTO: 1.03 K/UL (ref 0–0.85)
MONOCYTES NFR BLD AUTO: 5.9 % (ref 0–13.4)
MORPHOLOGY BLD-IMP: NORMAL
NEUTROPHILS # BLD AUTO: 14.87 K/UL (ref 2–7.15)
NEUTROPHILS NFR BLD: 85.4 % (ref 44–72)
NITRITE UR QL STRIP.AUTO: NEGATIVE
NRBC # BLD AUTO: 0 K/UL
NRBC BLD-RTO: 0 /100 WBC
PH UR STRIP.AUTO: 5 [PH] (ref 5–8)
PLATELET # BLD AUTO: 242 K/UL (ref 164–446)
PMV BLD AUTO: 10.3 FL (ref 9–12.9)
POTASSIUM SERPL-SCNC: 5.1 MMOL/L (ref 3.6–5.5)
PROT SERPL-MCNC: 5.4 G/DL (ref 6–8.2)
PROT UR QL STRIP: 30 MG/DL
PROTHROMBIN TIME: 15.5 SEC (ref 12–14.6)
PROTHROMBIN TIME: 16.3 SEC (ref 12–14.6)
RBC # BLD AUTO: 6.13 M/UL (ref 4.2–5.4)
RBC # URNS HPF: ABNORMAL /HPF
RBC UR QL AUTO: ABNORMAL
SODIUM SERPL-SCNC: 135 MMOL/L (ref 135–145)
SP GR UR STRIP.AUTO: 1.03
TROPONIN T SERPL-MCNC: 11 NG/L (ref 6–19)
UFH PPP CHRO-ACNC: <0.1 IU/ML
UROBILINOGEN UR STRIP.AUTO-MCNC: 0.2 MG/DL
WBC # BLD AUTO: 17.4 K/UL (ref 4.8–10.8)
WBC #/AREA URNS HPF: ABNORMAL /HPF

## 2021-12-01 PROCEDURE — 71275 CT ANGIOGRAPHY CHEST: CPT | Mod: ME

## 2021-12-01 PROCEDURE — 74177 CT ABD & PELVIS W/CONTRAST: CPT | Mod: MG

## 2021-12-01 PROCEDURE — 85520 HEPARIN ASSAY: CPT

## 2021-12-01 PROCEDURE — 93005 ELECTROCARDIOGRAM TRACING: CPT | Performed by: EMERGENCY MEDICINE

## 2021-12-01 PROCEDURE — 83605 ASSAY OF LACTIC ACID: CPT | Mod: 91

## 2021-12-01 PROCEDURE — 99223 1ST HOSP IP/OBS HIGH 75: CPT | Mod: AI | Performed by: INTERNAL MEDICINE

## 2021-12-01 PROCEDURE — 700111 HCHG RX REV CODE 636 W/ 250 OVERRIDE (IP): Performed by: EMERGENCY MEDICINE

## 2021-12-01 PROCEDURE — A9270 NON-COVERED ITEM OR SERVICE: HCPCS | Performed by: INTERNAL MEDICINE

## 2021-12-01 PROCEDURE — 700111 HCHG RX REV CODE 636 W/ 250 OVERRIDE (IP): Performed by: INTERNAL MEDICINE

## 2021-12-01 PROCEDURE — 80061 LIPID PANEL: CPT

## 2021-12-01 PROCEDURE — 83036 HEMOGLOBIN GLYCOSYLATED A1C: CPT

## 2021-12-01 PROCEDURE — 81001 URINALYSIS AUTO W/SCOPE: CPT

## 2021-12-01 PROCEDURE — 96375 TX/PRO/DX INJ NEW DRUG ADDON: CPT

## 2021-12-01 PROCEDURE — 700105 HCHG RX REV CODE 258: Performed by: EMERGENCY MEDICINE

## 2021-12-01 PROCEDURE — 96376 TX/PRO/DX INJ SAME DRUG ADON: CPT

## 2021-12-01 PROCEDURE — 85610 PROTHROMBIN TIME: CPT

## 2021-12-01 PROCEDURE — 71045 X-RAY EXAM CHEST 1 VIEW: CPT

## 2021-12-01 PROCEDURE — 82533 TOTAL CORTISOL: CPT

## 2021-12-01 PROCEDURE — 700102 HCHG RX REV CODE 250 W/ 637 OVERRIDE(OP): Performed by: INTERNAL MEDICINE

## 2021-12-01 PROCEDURE — 80053 COMPREHEN METABOLIC PANEL: CPT

## 2021-12-01 PROCEDURE — 700117 HCHG RX CONTRAST REV CODE 255: Performed by: EMERGENCY MEDICINE

## 2021-12-01 PROCEDURE — 93005 ELECTROCARDIOGRAM TRACING: CPT

## 2021-12-01 PROCEDURE — 87040 BLOOD CULTURE FOR BACTERIA: CPT | Mod: 91

## 2021-12-01 PROCEDURE — 85730 THROMBOPLASTIN TIME PARTIAL: CPT | Mod: 91

## 2021-12-01 PROCEDURE — 93971 EXTREMITY STUDY: CPT | Mod: LT

## 2021-12-01 PROCEDURE — 99291 CRITICAL CARE FIRST HOUR: CPT

## 2021-12-01 PROCEDURE — 85025 COMPLETE CBC W/AUTO DIFF WBC: CPT

## 2021-12-01 PROCEDURE — 96366 THER/PROPH/DIAG IV INF ADDON: CPT

## 2021-12-01 PROCEDURE — 84484 ASSAY OF TROPONIN QUANT: CPT

## 2021-12-01 PROCEDURE — 96365 THER/PROPH/DIAG IV INF INIT: CPT

## 2021-12-01 PROCEDURE — 770022 HCHG ROOM/CARE - ICU (200)

## 2021-12-01 PROCEDURE — 83690 ASSAY OF LIPASE: CPT

## 2021-12-01 PROCEDURE — 82962 GLUCOSE BLOOD TEST: CPT

## 2021-12-01 PROCEDURE — 700105 HCHG RX REV CODE 258

## 2021-12-01 RX ORDER — CETIRIZINE HYDROCHLORIDE 10 MG/1
10 TABLET ORAL
Status: DISCONTINUED | OUTPATIENT
Start: 2021-12-01 | End: 2021-12-02

## 2021-12-01 RX ORDER — AMILORIDE HYDROCHLORIDE 5 MG/1
5 TABLET ORAL DAILY
Status: DISCONTINUED | OUTPATIENT
Start: 2021-12-02 | End: 2021-12-02

## 2021-12-01 RX ORDER — CARVEDILOL 25 MG/1
25 TABLET ORAL 2 TIMES DAILY WITH MEALS
Status: DISCONTINUED | OUTPATIENT
Start: 2021-12-01 | End: 2021-12-02

## 2021-12-01 RX ORDER — HEPARIN SODIUM 1000 [USP'U]/ML
40 INJECTION, SOLUTION INTRAVENOUS; SUBCUTANEOUS PRN
Status: DISCONTINUED | OUTPATIENT
Start: 2021-12-01 | End: 2021-12-02

## 2021-12-01 RX ORDER — DEXTROSE MONOHYDRATE 25 G/50ML
50 INJECTION, SOLUTION INTRAVENOUS
Status: DISCONTINUED | OUTPATIENT
Start: 2021-12-01 | End: 2021-12-04

## 2021-12-01 RX ORDER — GABAPENTIN 300 MG/1
300 CAPSULE ORAL
Status: DISCONTINUED | OUTPATIENT
Start: 2021-12-01 | End: 2021-12-02

## 2021-12-01 RX ORDER — HEPARIN SODIUM 5000 [USP'U]/100ML
0-30 INJECTION, SOLUTION INTRAVENOUS CONTINUOUS
Status: DISCONTINUED | OUTPATIENT
Start: 2021-12-01 | End: 2021-12-02

## 2021-12-01 RX ORDER — BISACODYL 10 MG
10 SUPPOSITORY, RECTAL RECTAL
Status: DISCONTINUED | OUTPATIENT
Start: 2021-12-01 | End: 2021-12-04

## 2021-12-01 RX ORDER — CARVEDILOL 25 MG/1
25 TABLET ORAL 2 TIMES DAILY WITH MEALS
COMMUNITY

## 2021-12-01 RX ORDER — ONDANSETRON 4 MG/1
4 TABLET, ORALLY DISINTEGRATING ORAL EVERY 4 HOURS PRN
Status: DISCONTINUED | OUTPATIENT
Start: 2021-12-01 | End: 2021-12-04

## 2021-12-01 RX ORDER — POLYETHYLENE GLYCOL 3350 17 G/17G
1 POWDER, FOR SOLUTION ORAL
Status: DISCONTINUED | OUTPATIENT
Start: 2021-12-01 | End: 2021-12-04

## 2021-12-01 RX ORDER — AMITRIPTYLINE HYDROCHLORIDE 10 MG/1
10 TABLET, FILM COATED ORAL EVERY EVENING
Status: DISCONTINUED | OUTPATIENT
Start: 2021-12-01 | End: 2021-12-02

## 2021-12-01 RX ORDER — SODIUM CHLORIDE, SODIUM LACTATE, POTASSIUM CHLORIDE, CALCIUM CHLORIDE 600; 310; 30; 20 MG/100ML; MG/100ML; MG/100ML; MG/100ML
500 INJECTION, SOLUTION INTRAVENOUS ONCE
Status: COMPLETED | OUTPATIENT
Start: 2021-12-01 | End: 2021-12-01

## 2021-12-01 RX ORDER — PROMETHAZINE HYDROCHLORIDE 25 MG/1
12.5-25 SUPPOSITORY RECTAL EVERY 4 HOURS PRN
Status: DISCONTINUED | OUTPATIENT
Start: 2021-12-01 | End: 2021-12-29 | Stop reason: HOSPADM

## 2021-12-01 RX ORDER — FLUDROCORTISONE ACETATE 0.1 MG/1
0.1 TABLET ORAL 2 TIMES DAILY
Status: DISCONTINUED | OUTPATIENT
Start: 2021-12-01 | End: 2021-12-02

## 2021-12-01 RX ORDER — ONDANSETRON 2 MG/ML
4 INJECTION INTRAMUSCULAR; INTRAVENOUS EVERY 4 HOURS PRN
Status: DISCONTINUED | OUTPATIENT
Start: 2021-12-01 | End: 2021-12-29 | Stop reason: HOSPADM

## 2021-12-01 RX ORDER — AMOXICILLIN 250 MG
2 CAPSULE ORAL 2 TIMES DAILY
Status: DISCONTINUED | OUTPATIENT
Start: 2021-12-01 | End: 2021-12-04

## 2021-12-01 RX ORDER — SODIUM CHLORIDE, SODIUM LACTATE, POTASSIUM CHLORIDE, AND CALCIUM CHLORIDE .6; .31; .03; .02 G/100ML; G/100ML; G/100ML; G/100ML
500 INJECTION, SOLUTION INTRAVENOUS ONCE
Status: COMPLETED | OUTPATIENT
Start: 2021-12-01 | End: 2021-12-01

## 2021-12-01 RX ORDER — DIPHENHYDRAMINE HYDROCHLORIDE 50 MG/ML
50 INJECTION INTRAMUSCULAR; INTRAVENOUS ONCE
Status: COMPLETED | OUTPATIENT
Start: 2021-12-01 | End: 2021-12-01

## 2021-12-01 RX ORDER — SODIUM CHLORIDE 9 MG/ML
1000 INJECTION, SOLUTION INTRAVENOUS ONCE
Status: COMPLETED | OUTPATIENT
Start: 2021-12-01 | End: 2021-12-02

## 2021-12-01 RX ORDER — DEXAMETHASONE 1 MG
0.5 TABLET ORAL 2 TIMES DAILY
Status: DISCONTINUED | OUTPATIENT
Start: 2021-12-01 | End: 2021-12-02

## 2021-12-01 RX ORDER — KETOROLAC TROMETHAMINE 30 MG/ML
15 INJECTION, SOLUTION INTRAMUSCULAR; INTRAVENOUS EVERY 6 HOURS PRN
Status: DISCONTINUED | OUTPATIENT
Start: 2021-12-01 | End: 2021-12-02

## 2021-12-01 RX ORDER — ALBUTEROL SULFATE 90 UG/1
2 AEROSOL, METERED RESPIRATORY (INHALATION) EVERY 6 HOURS PRN
Status: DISCONTINUED | OUTPATIENT
Start: 2021-12-01 | End: 2021-12-02

## 2021-12-01 RX ORDER — ONDANSETRON 4 MG/1
4 TABLET, ORALLY DISINTEGRATING ORAL PRN
COMMUNITY

## 2021-12-01 RX ADMIN — HYDROCORTISONE SODIUM SUCCINATE 200 MG: 100 INJECTION, POWDER, FOR SOLUTION INTRAMUSCULAR; INTRAVENOUS at 18:48

## 2021-12-01 RX ADMIN — CARVEDILOL 25 MG: 25 TABLET, FILM COATED ORAL at 19:45

## 2021-12-01 RX ADMIN — SODIUM CHLORIDE 1000 ML: 9 INJECTION, SOLUTION INTRAVENOUS at 22:44

## 2021-12-01 RX ADMIN — FENTANYL CITRATE 50 MCG: 50 INJECTION, SOLUTION INTRAMUSCULAR; INTRAVENOUS at 14:11

## 2021-12-01 RX ADMIN — FLUDROCORTISONE ACETATE 0.1 MG: 0.1 TABLET ORAL at 21:23

## 2021-12-01 RX ADMIN — KETOROLAC TROMETHAMINE 15 MG: 30 INJECTION, SOLUTION INTRAMUSCULAR; INTRAVENOUS at 21:24

## 2021-12-01 RX ADMIN — IOHEXOL 100 ML: 350 INJECTION, SOLUTION INTRAVENOUS at 17:45

## 2021-12-01 RX ADMIN — HEPARIN SODIUM 18 UNITS/KG/HR: 5000 INJECTION, SOLUTION INTRAVENOUS at 18:48

## 2021-12-01 RX ADMIN — SODIUM CHLORIDE, POTASSIUM CHLORIDE, SODIUM LACTATE AND CALCIUM CHLORIDE 500 ML: 600; 310; 30; 20 INJECTION, SOLUTION INTRAVENOUS at 14:12

## 2021-12-01 RX ADMIN — CETIRIZINE HYDROCHLORIDE 10 MG: 10 TABLET, FILM COATED ORAL at 21:01

## 2021-12-01 RX ADMIN — DIPHENHYDRAMINE HYDROCHLORIDE 50 MG: 50 INJECTION INTRAMUSCULAR; INTRAVENOUS at 14:11

## 2021-12-01 RX ADMIN — GABAPENTIN 300 MG: 300 CAPSULE ORAL at 19:45

## 2021-12-01 RX ADMIN — FENTANYL CITRATE 50 MCG: 50 INJECTION, SOLUTION INTRAMUSCULAR; INTRAVENOUS at 17:11

## 2021-12-01 RX ADMIN — HYDROCORTISONE SODIUM SUCCINATE 200 MG: 100 INJECTION, POWDER, FOR SOLUTION INTRAMUSCULAR; INTRAVENOUS at 14:11

## 2021-12-01 RX ADMIN — AMITRIPTYLINE HYDROCHLORIDE 10 MG: 10 TABLET, FILM COATED ORAL at 19:46

## 2021-12-01 RX ADMIN — DEXAMETHASONE 0.5 MG: 1 TABLET ORAL at 19:45

## 2021-12-01 RX ADMIN — FENTANYL CITRATE 50 MCG: 50 INJECTION, SOLUTION INTRAMUSCULAR; INTRAVENOUS at 15:53

## 2021-12-01 ASSESSMENT — ENCOUNTER SYMPTOMS
HEMOPTYSIS: 0
PALPITATIONS: 0
WEIGHT LOSS: 0
STRIDOR: 0
SORE THROAT: 0
DEPRESSION: 0
VOMITING: 0
ABDOMINAL PAIN: 1
MYALGIAS: 0
INSOMNIA: 0
HEADACHES: 0
BLURRED VISION: 0
BRUISES/BLEEDS EASILY: 0
COUGH: 0
DOUBLE VISION: 0
FEVER: 0
NAUSEA: 1
NECK PAIN: 0
DIZZINESS: 0

## 2021-12-01 ASSESSMENT — PAIN DESCRIPTION - PAIN TYPE: TYPE: ACUTE PAIN

## 2021-12-01 ASSESSMENT — LIFESTYLE VARIABLES: DO YOU DRINK ALCOHOL: NO

## 2021-12-01 ASSESSMENT — FIBROSIS 4 INDEX: FIB4 SCORE: 1.66

## 2021-12-01 NOTE — ED TRIAGE NOTES
WC to triage w/ c/o onset of LUQ pain at 0100, woke her from sleep.  Pain then radiated to L chest & LUE w/ some SOB.  Pt sent down from infusion center, was supposed to have antibiotics into her picc, being treated for MRSA in her sinuses.
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
## 2021-12-02 ENCOUNTER — APPOINTMENT (OUTPATIENT)
Dept: RADIOLOGY | Facility: MEDICAL CENTER | Age: 56
DRG: 981 | End: 2021-12-02
Attending: EMERGENCY MEDICINE
Payer: MEDICARE

## 2021-12-02 ENCOUNTER — APPOINTMENT (OUTPATIENT)
Dept: RADIOLOGY | Facility: MEDICAL CENTER | Age: 56
DRG: 981 | End: 2021-12-02
Attending: INTERNAL MEDICINE
Payer: MEDICARE

## 2021-12-02 ENCOUNTER — APPOINTMENT (OUTPATIENT)
Dept: CARDIOLOGY | Facility: MEDICAL CENTER | Age: 56
DRG: 981 | End: 2021-12-02
Attending: HOSPITALIST
Payer: MEDICARE

## 2021-12-02 ENCOUNTER — APPOINTMENT (OUTPATIENT)
Dept: RADIOLOGY | Facility: MEDICAL CENTER | Age: 56
DRG: 981 | End: 2021-12-02
Attending: STUDENT IN AN ORGANIZED HEALTH CARE EDUCATION/TRAINING PROGRAM
Payer: MEDICARE

## 2021-12-02 PROBLEM — I46.9 CARDIAC ARREST (HCC): Status: ACTIVE | Noted: 2021-12-02

## 2021-12-02 PROBLEM — R57.9 SHOCK (HCC): Status: ACTIVE | Noted: 2021-12-02

## 2021-12-02 PROBLEM — D62 ACUTE BLOOD LOSS ANEMIA: Status: ACTIVE | Noted: 2021-08-12

## 2021-12-02 PROBLEM — K92.2 GASTROINTESTINAL HEMORRHAGE: Status: ACTIVE | Noted: 2021-12-02

## 2021-12-02 PROBLEM — I82.409 DVT (DEEP VENOUS THROMBOSIS) (HCC): Status: ACTIVE | Noted: 2021-12-02

## 2021-12-02 PROBLEM — K66.1 HEMOPERITONEUM: Status: ACTIVE | Noted: 2021-12-02

## 2021-12-02 LAB
ABO GROUP BLD: NORMAL
ALBUMIN SERPL BCP-MCNC: 1.5 G/DL (ref 3.2–4.9)
ALBUMIN SERPL BCP-MCNC: 2 G/DL (ref 3.2–4.9)
ALBUMIN/GLOB SERPL: 0.9 G/DL
ALBUMIN/GLOB SERPL: 1 G/DL
ALP SERPL-CCNC: 144 U/L (ref 30–99)
ALP SERPL-CCNC: 75 U/L (ref 30–99)
ALT SERPL-CCNC: 39 U/L (ref 2–50)
ALT SERPL-CCNC: 81 U/L (ref 2–50)
ANION GAP SERPL CALC-SCNC: 14 MMOL/L (ref 7–16)
ANION GAP SERPL CALC-SCNC: 19 MMOL/L (ref 7–16)
ANION GAP SERPL CALC-SCNC: 19 MMOL/L (ref 7–16)
ANION GAP SERPL CALC-SCNC: 21 MMOL/L (ref 7–16)
ANISOCYTOSIS BLD QL SMEAR: ABNORMAL
APTT PPP: 137.5 SEC (ref 24.7–36)
AST SERPL-CCNC: 68 U/L (ref 12–45)
AST SERPL-CCNC: 85 U/L (ref 12–45)
BARCODED ABORH UBTYP: 5100
BARCODED ABORH UBTYP: 6200
BARCODED ABORH UBTYP: 9500
BARCODED PRD CODE UBPRD: NORMAL
BARCODED UNIT NUM UBUNT: NORMAL
BASE EXCESS BLDA CALC-SCNC: -14 MMOL/L (ref -4–3)
BASE EXCESS BLDA CALC-SCNC: -20 MMOL/L (ref -4–3)
BASE EXCESS BLDA CALC-SCNC: -8 MMOL/L (ref -4–3)
BASOPHILS # BLD AUTO: 0.9 % (ref 0–1.8)
BASOPHILS # BLD: 0.13 K/UL (ref 0–0.12)
BILIRUB SERPL-MCNC: 0.3 MG/DL (ref 0.1–1.5)
BILIRUB SERPL-MCNC: 0.7 MG/DL (ref 0.1–1.5)
BLD GP AB SCN SERPL QL: NORMAL
BODY TEMPERATURE: ABNORMAL DEGREES
BREATHS SETTING VENT: 22
BREATHS SETTING VENT: 32
BREATHS SETTING VENT: 32
BUN SERPL-MCNC: 31 MG/DL (ref 8–22)
BUN SERPL-MCNC: 31 MG/DL (ref 8–22)
BUN SERPL-MCNC: 32 MG/DL (ref 8–22)
BUN SERPL-MCNC: 35 MG/DL (ref 8–22)
BURR CELLS BLD QL SMEAR: NORMAL
CALCIUM SERPL-MCNC: 11 MG/DL (ref 8.5–10.5)
CALCIUM SERPL-MCNC: 7.7 MG/DL (ref 8.5–10.5)
CALCIUM SERPL-MCNC: 8.5 MG/DL (ref 8.5–10.5)
CALCIUM SERPL-MCNC: 9.6 MG/DL (ref 8.5–10.5)
CFT BLD TEG: 8.4 MIN (ref 4.6–9.1)
CFT BLD TEG: >17 MIN (ref 4.6–9.1)
CFT P HPASE BLD TEG: 6.3 MIN (ref 4.3–8.3)
CFT P HPASE BLD TEG: 7.7 MIN (ref 4.3–8.3)
CHLORIDE SERPL-SCNC: 106 MMOL/L (ref 96–112)
CHLORIDE SERPL-SCNC: 109 MMOL/L (ref 96–112)
CHLORIDE SERPL-SCNC: 109 MMOL/L (ref 96–112)
CHLORIDE SERPL-SCNC: 110 MMOL/L (ref 96–112)
CHOLEST SERPL-MCNC: 94 MG/DL (ref 100–199)
CLOT ANGLE BLD TEG: 66.5 DEGREES (ref 63–78)
CLOT ANGLE BLD TEG: <39 DEGREES (ref 63–78)
CLOT LYSIS 30M P MA LENFR BLD TEG: 0 % (ref 0–2.6)
CO2 BLDA-SCNC: 13 MMOL/L (ref 20–33)
CO2 BLDA-SCNC: 16 MMOL/L (ref 20–33)
CO2 BLDA-SCNC: 18 MMOL/L (ref 20–33)
CO2 SERPL-SCNC: 12 MMOL/L (ref 20–33)
CO2 SERPL-SCNC: 13 MMOL/L (ref 20–33)
CO2 SERPL-SCNC: 14 MMOL/L (ref 20–33)
CO2 SERPL-SCNC: 15 MMOL/L (ref 20–33)
COMPONENT F 8504F: NORMAL
COMPONENT P 8504P: NORMAL
COMPONENT R 8504R: NORMAL
CREAT SERPL-MCNC: 1.59 MG/DL (ref 0.5–1.4)
CREAT SERPL-MCNC: 1.65 MG/DL (ref 0.5–1.4)
CREAT SERPL-MCNC: 1.67 MG/DL (ref 0.5–1.4)
CREAT SERPL-MCNC: 2.01 MG/DL (ref 0.5–1.4)
CT.EXTRINSIC BLD ROTEM: 3.3 MIN (ref 0.8–2.1)
CT.EXTRINSIC BLD ROTEM: ABNORMAL MIN (ref 0.8–2.1)
DELSYS IDSYS: ABNORMAL
END TIDAL CARBON DIOXIDE IECO2: 22 MMHG
END TIDAL CARBON DIOXIDE IECO2: 22 MMHG
END TIDAL CARBON DIOXIDE IECO2: 31 MMHG
EOSINOPHIL # BLD AUTO: 0 K/UL (ref 0–0.51)
EOSINOPHIL NFR BLD: 0 % (ref 0–6.9)
ERYTHROCYTE [DISTWIDTH] IN BLOOD BY AUTOMATED COUNT: 60.9 FL (ref 35.9–50)
ERYTHROCYTE [DISTWIDTH] IN BLOOD BY AUTOMATED COUNT: 62.9 FL (ref 35.9–50)
ERYTHROCYTE [DISTWIDTH] IN BLOOD BY AUTOMATED COUNT: 66.3 FL (ref 35.9–50)
ERYTHROCYTE [DISTWIDTH] IN BLOOD BY AUTOMATED COUNT: 74 FL (ref 35.9–50)
EST. AVERAGE GLUCOSE BLD GHB EST-MCNC: 131 MG/DL
GLOBULIN SER CALC-MCNC: 1.7 G/DL (ref 1.9–3.5)
GLOBULIN SER CALC-MCNC: 2.1 G/DL (ref 1.9–3.5)
GLUCOSE BLD-MCNC: 41 MG/DL (ref 65–99)
GLUCOSE BLD-MCNC: 72 MG/DL (ref 65–99)
GLUCOSE BLD-MCNC: 81 MG/DL (ref 65–99)
GLUCOSE BLD-MCNC: 96 MG/DL (ref 65–99)
GLUCOSE BLD-MCNC: 99 MG/DL (ref 65–99)
GLUCOSE SERPL-MCNC: 127 MG/DL (ref 65–99)
GLUCOSE SERPL-MCNC: 46 MG/DL (ref 65–99)
GLUCOSE SERPL-MCNC: 52 MG/DL (ref 65–99)
GLUCOSE SERPL-MCNC: 78 MG/DL (ref 65–99)
HBA1C MFR BLD: 6.2 % (ref 4–5.6)
HCO3 BLDA-SCNC: 11.8 MMOL/L (ref 17–25)
HCO3 BLDA-SCNC: 14.6 MMOL/L (ref 17–25)
HCO3 BLDA-SCNC: 17.3 MMOL/L (ref 17–25)
HCT VFR BLD AUTO: 29.7 % (ref 37–47)
HCT VFR BLD AUTO: 35.6 % (ref 37–47)
HCT VFR BLD AUTO: 37.1 % (ref 37–47)
HCT VFR BLD AUTO: 39.8 % (ref 37–47)
HCT VFR BLD AUTO: 40 % (ref 37–47)
HDLC SERPL-MCNC: 54 MG/DL
HGB BLD-MCNC: 10.5 G/DL (ref 12–16)
HGB BLD-MCNC: 11.5 G/DL (ref 12–16)
HGB BLD-MCNC: 12.6 G/DL (ref 12–16)
HGB BLD-MCNC: 12.8 G/DL (ref 12–16)
HGB BLD-MCNC: 8.2 G/DL (ref 12–16)
HOROWITZ INDEX BLDA+IHG-RTO: 213 MM[HG]
HOROWITZ INDEX BLDA+IHG-RTO: 221 MM[HG]
HOROWITZ INDEX BLDA+IHG-RTO: 294 MM[HG]
HYPOCHROMIA BLD QL SMEAR: ABNORMAL
INR PPP: 1.47 (ref 0.87–1.13)
INR PPP: 2.5 (ref 0.87–1.13)
LDLC SERPL CALC-MCNC: 25 MG/DL
LV EJECT FRACT  99904: 60
LV EJECT FRACT MOD 2C 99903: 36.36
LV EJECT FRACT MOD 4C 99902: 50.42
LYMPHOCYTES # BLD AUTO: 2.42 K/UL (ref 1–4.8)
LYMPHOCYTES NFR BLD: 17.4 % (ref 22–41)
MACROCYTES BLD QL SMEAR: ABNORMAL
MANUAL DIFF BLD: ABNORMAL
MCF BLD TEG: 59.7 MM (ref 52–69)
MCF BLD TEG: <40 MM (ref 52–69)
MCF.PLATELET INHIB BLD ROTEM: 17.9 MM (ref 15–32)
MCF.PLATELET INHIB BLD ROTEM: 28.4 MM (ref 15–32)
MCH RBC QN AUTO: 20.9 PG (ref 27–33)
MCH RBC QN AUTO: 26.9 PG (ref 27–33)
MCH RBC QN AUTO: 27.3 PG (ref 27–33)
MCH RBC QN AUTO: 27.5 PG (ref 27–33)
MCHC RBC AUTO-ENTMCNC: 27.6 G/DL (ref 33.6–35)
MCHC RBC AUTO-ENTMCNC: 29.5 G/DL (ref 33.6–35)
MCHC RBC AUTO-ENTMCNC: 31 G/DL (ref 33.6–35)
MCHC RBC AUTO-ENTMCNC: 32 G/DL (ref 33.6–35)
MCV RBC AUTO: 75.8 FL (ref 81.4–97.8)
MCV RBC AUTO: 85.8 FL (ref 81.4–97.8)
MCV RBC AUTO: 86.9 FL (ref 81.4–97.8)
MCV RBC AUTO: 92.7 FL (ref 81.4–97.8)
METAMYELOCYTES NFR BLD MANUAL: 6.9 %
MICROCYTES BLD QL SMEAR: ABNORMAL
MODE IMODE: ABNORMAL
MONOCYTES # BLD AUTO: 0.72 K/UL (ref 0–0.85)
MONOCYTES NFR BLD AUTO: 5.2 % (ref 0–13.4)
MORPHOLOGY BLD-IMP: NORMAL
MYELOCYTES NFR BLD MANUAL: 5.2 %
NEUTROPHILS # BLD AUTO: 8.83 K/UL (ref 2–7.15)
NEUTROPHILS NFR BLD: 43.5 % (ref 44–72)
NEUTS BAND NFR BLD MANUAL: 20 % (ref 0–10)
NRBC # BLD AUTO: 0.07 K/UL
NRBC BLD-RTO: 0.5 /100 WBC
O2/TOTAL GAS SETTING VFR VENT: 100 %
O2/TOTAL GAS SETTING VFR VENT: 100 %
O2/TOTAL GAS SETTING VFR VENT: 80 %
PA AA BLD-ACNC: 46.4 % (ref 0–11)
PA AA BLD-ACNC: 70 % (ref 0–11)
PA ADP BLD-ACNC: 32.5 % (ref 0–17)
PA ADP BLD-ACNC: 69.8 % (ref 0–17)
PCO2 BLDA: 35.7 MMHG (ref 26–37)
PCO2 BLDA: 42.8 MMHG (ref 26–37)
PCO2 BLDA: 51.4 MMHG (ref 26–37)
PCO2 TEMP ADJ BLDA: 34.3 MMHG (ref 26–37)
PCO2 TEMP ADJ BLDA: 40.4 MMHG (ref 26–37)
PEEP END EXPIRATORY PRESSURE IPEEP: 10 CMH20
PEEP END EXPIRATORY PRESSURE IPEEP: 10 CMH20
PEEP END EXPIRATORY PRESSURE IPEEP: 8 CMH20
PH BLDA: 6.97 [PH] (ref 7.4–7.5)
PH BLDA: 7.14 [PH] (ref 7.4–7.5)
PH BLDA: 7.29 [PH] (ref 7.4–7.5)
PH TEMP ADJ BLDA: 7.16 [PH] (ref 7.4–7.5)
PH TEMP ADJ BLDA: 7.31 [PH] (ref 7.4–7.5)
PLATELET # BLD AUTO: 121 K/UL (ref 164–446)
PLATELET # BLD AUTO: 160 K/UL (ref 164–446)
PLATELET # BLD AUTO: 57 K/UL (ref 164–446)
PLATELET # BLD AUTO: 72 K/UL (ref 164–446)
PLATELET BLD QL SMEAR: NORMAL
PLATELETS.RETICULATED NFR BLD AUTO: 10.2 K/UL (ref 0.6–13.1)
PMV BLD AUTO: 10.6 FL (ref 9–12.9)
PO2 BLDA: 170 MMHG (ref 64–87)
PO2 BLDA: 221 MMHG (ref 64–87)
PO2 BLDA: 294 MMHG (ref 64–87)
PO2 TEMP ADJ BLDA: 163 MMHG (ref 64–87)
PO2 TEMP ADJ BLDA: 290 MMHG (ref 64–87)
POTASSIUM SERPL-SCNC: 4.8 MMOL/L (ref 3.6–5.5)
POTASSIUM SERPL-SCNC: 5.8 MMOL/L (ref 3.6–5.5)
POTASSIUM SERPL-SCNC: 5.9 MMOL/L (ref 3.6–5.5)
POTASSIUM SERPL-SCNC: 6.4 MMOL/L (ref 3.6–5.5)
PRODUCT TYPE UPROD: NORMAL
PROMYELOCYTES NFR BLD MANUAL: 0.9 %
PROT SERPL-MCNC: 3.2 G/DL (ref 6–8.2)
PROT SERPL-MCNC: 4.1 G/DL (ref 6–8.2)
PROTHROMBIN TIME: 17.4 SEC (ref 12–14.6)
PROTHROMBIN TIME: 26.2 SEC (ref 12–14.6)
RBC # BLD AUTO: 3.84 M/UL (ref 4.2–5.4)
RBC # BLD AUTO: 3.92 M/UL (ref 4.2–5.4)
RBC # BLD AUTO: 4.27 M/UL (ref 4.2–5.4)
RBC # BLD AUTO: 4.66 M/UL (ref 4.2–5.4)
RBC BLD AUTO: PRESENT
RH BLD: NORMAL
SAO2 % BLDA: 100 % (ref 93–99)
SAO2 % BLDA: 99 % (ref 93–99)
SAO2 % BLDA: 99 % (ref 93–99)
SMUDGE CELLS BLD QL SMEAR: NORMAL
SODIUM SERPL-SCNC: 134 MMOL/L (ref 135–145)
SODIUM SERPL-SCNC: 141 MMOL/L (ref 135–145)
SODIUM SERPL-SCNC: 141 MMOL/L (ref 135–145)
SODIUM SERPL-SCNC: 145 MMOL/L (ref 135–145)
SPECIMEN DRAWN FROM PATIENT: ABNORMAL
SPHEROCYTES BLD QL SMEAR: NORMAL
TEG ALGORITHM TGALG: ABNORMAL
TEG ALGORITHM TGALG: ABNORMAL
TIDAL VOLUME IVT: 330 ML
TRIGL SERPL-MCNC: 77 MG/DL (ref 0–149)
UFH PPP CHRO-ACNC: 0.11 IU/ML
UFH PPP CHRO-ACNC: 0.26 IU/ML
UFH PPP CHRO-ACNC: >1.1 IU/ML
UNIT STATUS USTAT: NORMAL
WBC # BLD AUTO: 11.2 K/UL (ref 4.8–10.8)
WBC # BLD AUTO: 13.9 K/UL (ref 4.8–10.8)
WBC # BLD AUTO: 23.7 K/UL (ref 4.8–10.8)
WBC # BLD AUTO: 8.7 K/UL (ref 4.8–10.8)

## 2021-12-02 PROCEDURE — 71045 X-RAY EXAM CHEST 1 VIEW: CPT

## 2021-12-02 PROCEDURE — 36556 INSERT NON-TUNNEL CV CATH: CPT | Mod: LT | Performed by: INTERNAL MEDICINE

## 2021-12-02 PROCEDURE — 700105 HCHG RX REV CODE 258: Performed by: INTERNAL MEDICINE

## 2021-12-02 PROCEDURE — 700117 HCHG RX CONTRAST REV CODE 255: Performed by: EMERGENCY MEDICINE

## 2021-12-02 PROCEDURE — 86901 BLOOD TYPING SEROLOGIC RH(D): CPT

## 2021-12-02 PROCEDURE — 85018 HEMOGLOBIN: CPT

## 2021-12-02 PROCEDURE — 700111 HCHG RX REV CODE 636 W/ 250 OVERRIDE (IP): Performed by: STUDENT IN AN ORGANIZED HEALTH CARE EDUCATION/TRAINING PROGRAM

## 2021-12-02 PROCEDURE — 96367 TX/PROPH/DG ADDL SEQ IV INF: CPT

## 2021-12-02 PROCEDURE — 700101 HCHG RX REV CODE 250: Performed by: INTERNAL MEDICINE

## 2021-12-02 PROCEDURE — 5A12012 PERFORMANCE OF CARDIAC OUTPUT, SINGLE, MANUAL: ICD-10-PCS | Performed by: INTERNAL MEDICINE

## 2021-12-02 PROCEDURE — 86900 BLOOD TYPING SEROLOGIC ABO: CPT

## 2021-12-02 PROCEDURE — 700101 HCHG RX REV CODE 250

## 2021-12-02 PROCEDURE — 99292 CRITICAL CARE ADDL 30 MIN: CPT | Mod: 25 | Performed by: INTERNAL MEDICINE

## 2021-12-02 PROCEDURE — 85007 BL SMEAR W/DIFF WBC COUNT: CPT

## 2021-12-02 PROCEDURE — 85055 RETICULATED PLATELET ASSAY: CPT

## 2021-12-02 PROCEDURE — 93306 TTE W/DOPPLER COMPLETE: CPT

## 2021-12-02 PROCEDURE — 05HY33Z INSERTION OF INFUSION DEVICE INTO UPPER VEIN, PERCUTANEOUS APPROACH: ICD-10-PCS | Performed by: INTERNAL MEDICINE

## 2021-12-02 PROCEDURE — 85027 COMPLETE CBC AUTOMATED: CPT | Mod: 91

## 2021-12-02 PROCEDURE — 04HY32Z INSERTION OF MONITORING DEVICE INTO LOWER ARTERY, PERCUTANEOUS APPROACH: ICD-10-PCS | Performed by: INTERNAL MEDICINE

## 2021-12-02 PROCEDURE — 94002 VENT MGMT INPAT INIT DAY: CPT

## 2021-12-02 PROCEDURE — 96366 THER/PROPH/DIAG IV INF ADDON: CPT

## 2021-12-02 PROCEDURE — 700111 HCHG RX REV CODE 636 W/ 250 OVERRIDE (IP): Performed by: INTERNAL MEDICINE

## 2021-12-02 PROCEDURE — 94644 CONT INHLJ TX 1ST HOUR: CPT

## 2021-12-02 PROCEDURE — 36620 INSERTION CATHETER ARTERY: CPT | Performed by: INTERNAL MEDICINE

## 2021-12-02 PROCEDURE — P9016 RBC LEUKOCYTES REDUCED: HCPCS | Mod: 91

## 2021-12-02 PROCEDURE — 85384 FIBRINOGEN ACTIVITY: CPT | Mod: 91

## 2021-12-02 PROCEDURE — 700101 HCHG RX REV CODE 250: Performed by: HOSPITALIST

## 2021-12-02 PROCEDURE — 36415 COLL VENOUS BLD VENIPUNCTURE: CPT

## 2021-12-02 PROCEDURE — 0BH18EZ INSERTION OF ENDOTRACHEAL AIRWAY INTO TRACHEA, VIA NATURAL OR ARTIFICIAL OPENING ENDOSCOPIC: ICD-10-PCS | Performed by: EMERGENCY MEDICINE

## 2021-12-02 PROCEDURE — 700111 HCHG RX REV CODE 636 W/ 250 OVERRIDE (IP): Performed by: EMERGENCY MEDICINE

## 2021-12-02 PROCEDURE — 5A2204Z RESTORATION OF CARDIAC RHYTHM, SINGLE: ICD-10-PCS | Performed by: INTERNAL MEDICINE

## 2021-12-02 PROCEDURE — 36430 TRANSFUSION BLD/BLD COMPNT: CPT

## 2021-12-02 PROCEDURE — 85610 PROTHROMBIN TIME: CPT

## 2021-12-02 PROCEDURE — 74174 CTA ABD&PLVS W/CONTRAST: CPT | Mod: MG

## 2021-12-02 PROCEDURE — 30233N1 TRANSFUSION OF NONAUTOLOGOUS RED BLOOD CELLS INTO PERIPHERAL VEIN, PERCUTANEOUS APPROACH: ICD-10-PCS | Performed by: INTERNAL MEDICINE

## 2021-12-02 PROCEDURE — 80053 COMPREHEN METABOLIC PANEL: CPT | Mod: 91

## 2021-12-02 PROCEDURE — 770022 HCHG ROOM/CARE - ICU (200)

## 2021-12-02 PROCEDURE — 99223 1ST HOSP IP/OBS HIGH 75: CPT | Performed by: SURGERY

## 2021-12-02 PROCEDURE — 85347 COAGULATION TIME ACTIVATED: CPT | Mod: 91

## 2021-12-02 PROCEDURE — 37799 UNLISTED PX VASCULAR SURGERY: CPT

## 2021-12-02 PROCEDURE — 5A1955Z RESPIRATORY VENTILATION, GREATER THAN 96 CONSECUTIVE HOURS: ICD-10-PCS | Performed by: INTERNAL MEDICINE

## 2021-12-02 PROCEDURE — C9113 INJ PANTOPRAZOLE SODIUM, VIA: HCPCS | Performed by: STUDENT IN AN ORGANIZED HEALTH CARE EDUCATION/TRAINING PROGRAM

## 2021-12-02 PROCEDURE — 700117 HCHG RX CONTRAST REV CODE 255: Performed by: HOSPITALIST

## 2021-12-02 PROCEDURE — 02H633Z INSERTION OF INFUSION DEVICE INTO RIGHT ATRIUM, PERCUTANEOUS APPROACH: ICD-10-PCS | Performed by: INTERNAL MEDICINE

## 2021-12-02 PROCEDURE — 700111 HCHG RX REV CODE 636 W/ 250 OVERRIDE (IP): Performed by: HOSPITALIST

## 2021-12-02 PROCEDURE — 30233K1 TRANSFUSION OF NONAUTOLOGOUS FROZEN PLASMA INTO PERIPHERAL VEIN, PERCUTANEOUS APPROACH: ICD-10-PCS | Performed by: INTERNAL MEDICINE

## 2021-12-02 PROCEDURE — 99291 CRITICAL CARE FIRST HOUR: CPT | Performed by: HOSPITALIST

## 2021-12-02 PROCEDURE — P9034 PLATELETS, PHERESIS: HCPCS

## 2021-12-02 PROCEDURE — 96376 TX/PRO/DX INJ SAME DRUG ADON: CPT

## 2021-12-02 PROCEDURE — 92950 HEART/LUNG RESUSCITATION CPR: CPT

## 2021-12-02 PROCEDURE — 36620 INSERTION CATHETER ARTERY: CPT

## 2021-12-02 PROCEDURE — 94799 UNLISTED PULMONARY SVC/PX: CPT

## 2021-12-02 PROCEDURE — 96375 TX/PRO/DX INJ NEW DRUG ADDON: CPT

## 2021-12-02 PROCEDURE — 93306 TTE W/DOPPLER COMPLETE: CPT | Mod: 26 | Performed by: INTERNAL MEDICINE

## 2021-12-02 PROCEDURE — 99291 CRITICAL CARE FIRST HOUR: CPT | Mod: 25 | Performed by: INTERNAL MEDICINE

## 2021-12-02 PROCEDURE — 31500 INSERT EMERGENCY AIRWAY: CPT

## 2021-12-02 PROCEDURE — 85576 BLOOD PLATELET AGGREGATION: CPT | Mod: 91

## 2021-12-02 PROCEDURE — C1751 CATH, INF, PER/CENT/MIDLINE: HCPCS

## 2021-12-02 PROCEDURE — 94003 VENT MGMT INPAT SUBQ DAY: CPT

## 2021-12-02 PROCEDURE — 86850 RBC ANTIBODY SCREEN: CPT

## 2021-12-02 PROCEDURE — 700111 HCHG RX REV CODE 636 W/ 250 OVERRIDE (IP)

## 2021-12-02 PROCEDURE — 82962 GLUCOSE BLOOD TEST: CPT | Mod: 91

## 2021-12-02 PROCEDURE — 80048 BASIC METABOLIC PNL TOTAL CA: CPT

## 2021-12-02 PROCEDURE — 85520 HEPARIN ASSAY: CPT

## 2021-12-02 PROCEDURE — 96368 THER/DIAG CONCURRENT INF: CPT

## 2021-12-02 PROCEDURE — 30233R1 TRANSFUSION OF NONAUTOLOGOUS PLATELETS INTO PERIPHERAL VEIN, PERCUTANEOUS APPROACH: ICD-10-PCS | Performed by: INTERNAL MEDICINE

## 2021-12-02 PROCEDURE — 85014 HEMATOCRIT: CPT

## 2021-12-02 PROCEDURE — 82803 BLOOD GASES ANY COMBINATION: CPT | Mod: 91

## 2021-12-02 PROCEDURE — 36556 INSERT NON-TUNNEL CV CATH: CPT

## 2021-12-02 PROCEDURE — 93970 EXTREMITY STUDY: CPT

## 2021-12-02 PROCEDURE — 86923 COMPATIBILITY TEST ELECTRIC: CPT | Mod: 91

## 2021-12-02 RX ORDER — NOREPINEPHRINE BITARTRATE 0.03 MG/ML
0-50 INJECTION, SOLUTION INTRAVENOUS CONTINUOUS
Status: DISCONTINUED | OUTPATIENT
Start: 2021-12-02 | End: 2021-12-06

## 2021-12-02 RX ORDER — POLYETHYLENE GLYCOL 3350 17 G/17G
1 POWDER, FOR SOLUTION ORAL
Status: DISCONTINUED | OUTPATIENT
Start: 2021-12-02 | End: 2021-12-02

## 2021-12-02 RX ORDER — EPINEPHRINE HCL IN 0.9 % NACL 4MG/250ML
0.08 PLASTIC BAG, INJECTION (ML) INTRAVENOUS CONTINUOUS
Status: DISCONTINUED | OUTPATIENT
Start: 2021-12-02 | End: 2021-12-02

## 2021-12-02 RX ORDER — DEXMEDETOMIDINE HYDROCHLORIDE 4 UG/ML
0-1.5 INJECTION, SOLUTION INTRAVENOUS CONTINUOUS
Status: DISCONTINUED | OUTPATIENT
Start: 2021-12-02 | End: 2021-12-02

## 2021-12-02 RX ORDER — HYDROMORPHONE HYDROCHLORIDE 1 MG/ML
.5-2 INJECTION, SOLUTION INTRAMUSCULAR; INTRAVENOUS; SUBCUTANEOUS
Status: DISCONTINUED | OUTPATIENT
Start: 2021-12-02 | End: 2021-12-08

## 2021-12-02 RX ORDER — FUROSEMIDE 10 MG/ML
40 INJECTION INTRAMUSCULAR; INTRAVENOUS ONCE
Status: COMPLETED | OUTPATIENT
Start: 2021-12-02 | End: 2021-12-02

## 2021-12-02 RX ORDER — PANTOPRAZOLE SODIUM 40 MG/10ML
40 INJECTION, POWDER, LYOPHILIZED, FOR SOLUTION INTRAVENOUS 2 TIMES DAILY
Status: DISCONTINUED | OUTPATIENT
Start: 2021-12-02 | End: 2021-12-05

## 2021-12-02 RX ORDER — EPINEPHRINE HCL IN 0.9 % NACL 4MG/250ML
PLASTIC BAG, INJECTION (ML) INTRAVENOUS
Status: COMPLETED
Start: 2021-12-02 | End: 2021-12-02

## 2021-12-02 RX ORDER — MIDAZOLAM HYDROCHLORIDE 1 MG/ML
5 INJECTION INTRAMUSCULAR; INTRAVENOUS ONCE
Status: COMPLETED | OUTPATIENT
Start: 2021-12-02 | End: 2021-12-02

## 2021-12-02 RX ORDER — DEXMEDETOMIDINE HYDROCHLORIDE 4 UG/ML
.1-1.5 INJECTION, SOLUTION INTRAVENOUS CONTINUOUS
Status: DISCONTINUED | OUTPATIENT
Start: 2021-12-02 | End: 2021-12-05

## 2021-12-02 RX ORDER — CALCIUM CHLORIDE 100 MG/ML
1 INJECTION INTRAVENOUS; INTRAVENTRICULAR ONCE
Status: DISCONTINUED | OUTPATIENT
Start: 2021-12-02 | End: 2021-12-02

## 2021-12-02 RX ORDER — EPINEPHRINE 0.1 MG/ML
1 SYRINGE (ML) INJECTION ONCE
Status: DISCONTINUED | OUTPATIENT
Start: 2021-12-02 | End: 2021-12-02

## 2021-12-02 RX ORDER — MIDAZOLAM HYDROCHLORIDE 1 MG/ML
INJECTION INTRAMUSCULAR; INTRAVENOUS
Status: COMPLETED
Start: 2021-12-02 | End: 2021-12-02

## 2021-12-02 RX ORDER — EPINEPHRINE 0.1 MG/ML
1 SYRINGE (ML) INJECTION ONCE
Status: COMPLETED | OUTPATIENT
Start: 2021-12-02 | End: 2021-12-02

## 2021-12-02 RX ORDER — BISACODYL 10 MG
10 SUPPOSITORY, RECTAL RECTAL
Status: DISCONTINUED | OUTPATIENT
Start: 2021-12-02 | End: 2021-12-02

## 2021-12-02 RX ORDER — CALCIUM CHLORIDE 100 MG/ML
INJECTION INTRAVENOUS; INTRAVENTRICULAR
Status: DISCONTINUED
Start: 2021-12-02 | End: 2021-12-02

## 2021-12-02 RX ORDER — AMOXICILLIN 250 MG
2 CAPSULE ORAL 2 TIMES DAILY
Status: DISCONTINUED | OUTPATIENT
Start: 2021-12-02 | End: 2021-12-02

## 2021-12-02 RX ORDER — EPINEPHRINE 0.1 MG/ML
SYRINGE (ML) INJECTION
Status: COMPLETED | OUTPATIENT
Start: 2021-12-02 | End: 2021-12-02

## 2021-12-02 RX ADMIN — DEXMEDETOMIDINE 1.5 MCG/KG/HR: 200 INJECTION, SOLUTION INTRAVENOUS at 14:15

## 2021-12-02 RX ADMIN — SODIUM BICARBONATE 50 MEQ: 84 INJECTION, SOLUTION INTRAVENOUS at 11:25

## 2021-12-02 RX ADMIN — PANTOPRAZOLE SODIUM 40 MG: 40 INJECTION, POWDER, LYOPHILIZED, FOR SOLUTION INTRAVENOUS at 11:26

## 2021-12-02 RX ADMIN — HYDROMORPHONE HYDROCHLORIDE 2 MG: 1 INJECTION, SOLUTION INTRAMUSCULAR; INTRAVENOUS; SUBCUTANEOUS at 14:21

## 2021-12-02 RX ADMIN — WATER 0.05 MCG/KG/MIN: 1 SOLUTION INTRAVENOUS at 11:57

## 2021-12-02 RX ADMIN — NOREPINEPHRINE BITARTRATE 10 MCG/MIN: 1 INJECTION, SOLUTION, CONCENTRATE INTRAVENOUS at 08:00

## 2021-12-02 RX ADMIN — EPINEPHRINE 1 MG: 0.1 INJECTION, SOLUTION INTRAVENOUS at 09:25

## 2021-12-02 RX ADMIN — Medication 0.5 MG: at 11:27

## 2021-12-02 RX ADMIN — VANCOMYCIN HYDROCHLORIDE 2250 MG: 500 INJECTION, POWDER, LYOPHILIZED, FOR SOLUTION INTRAVENOUS at 12:02

## 2021-12-02 RX ADMIN — SODIUM BICARBONATE 150 MEQ: 84 INJECTION, SOLUTION INTRAVENOUS at 13:35

## 2021-12-02 RX ADMIN — HYDROMORPHONE HYDROCHLORIDE 1 MG: 1 INJECTION, SOLUTION INTRAMUSCULAR; INTRAVENOUS; SUBCUTANEOUS at 12:21

## 2021-12-02 RX ADMIN — FUROSEMIDE 40 MG: 10 INJECTION, SOLUTION INTRAMUSCULAR; INTRAVENOUS at 11:28

## 2021-12-02 RX ADMIN — EPINEPHRINE 0.08 MCG/KG/MIN: 1 INJECTION INTRAMUSCULAR; INTRAVENOUS; SUBCUTANEOUS at 09:45

## 2021-12-02 RX ADMIN — Medication 0.08 MCG/KG/MIN: at 09:45

## 2021-12-02 RX ADMIN — DEXTROSE MONOHYDRATE 50 ML: 25 INJECTION, SOLUTION INTRAVENOUS at 12:38

## 2021-12-02 RX ADMIN — HYDROCORTISONE SODIUM SUCCINATE 100 MG: 100 INJECTION, POWDER, FOR SOLUTION INTRAMUSCULAR; INTRAVENOUS at 15:08

## 2021-12-02 RX ADMIN — DEXTROSE MONOHYDRATE 50 ML: 25 INJECTION, SOLUTION INTRAVENOUS at 18:42

## 2021-12-02 RX ADMIN — EPINEPHRINE 1 MG: 0.1 INJECTION, SOLUTION INTRAVENOUS at 09:17

## 2021-12-02 RX ADMIN — Medication 1.5 MG: at 19:58

## 2021-12-02 RX ADMIN — FENTANYL CITRATE 12.5 MCG: 50 INJECTION, SOLUTION INTRAMUSCULAR; INTRAVENOUS at 08:28

## 2021-12-02 RX ADMIN — EPINEPHRINE 1 MG: 0.1 INJECTION, SOLUTION INTRAVENOUS at 09:05

## 2021-12-02 RX ADMIN — MIDAZOLAM HYDROCHLORIDE 5 MG: 1 INJECTION, SOLUTION INTRAMUSCULAR; INTRAVENOUS at 09:44

## 2021-12-02 RX ADMIN — VASOPRESSIN 0.03 UNITS/MIN: 20 INJECTION INTRAVENOUS at 17:35

## 2021-12-02 RX ADMIN — EPINEPHRINE 1 MG: 0.1 INJECTION, SOLUTION INTRAVENOUS at 09:09

## 2021-12-02 RX ADMIN — EPINEPHRINE 1 MG: 0.1 INJECTION, SOLUTION INTRAVENOUS at 09:21

## 2021-12-02 RX ADMIN — MEROPENEM 500 MG: 500 INJECTION, POWDER, FOR SOLUTION INTRAVENOUS at 11:28

## 2021-12-02 RX ADMIN — WATER 0.05 MCG/KG/MIN: 1 SOLUTION INTRAVENOUS at 16:52

## 2021-12-02 RX ADMIN — MIDAZOLAM HYDROCHLORIDE 5 MG: 1 INJECTION INTRAMUSCULAR; INTRAVENOUS at 09:44

## 2021-12-02 RX ADMIN — WATER 0.05 MCG/KG/MIN: 1 SOLUTION INTRAVENOUS at 22:16

## 2021-12-02 RX ADMIN — HUMAN ALBUMIN MICROSPHERES AND PERFLUTREN 3 ML: 10; .22 INJECTION, SOLUTION INTRAVENOUS at 09:02

## 2021-12-02 RX ADMIN — IOHEXOL 100 ML: 350 INJECTION, SOLUTION INTRAVENOUS at 10:31

## 2021-12-02 RX ADMIN — FENTANYL CITRATE 25 MCG: 50 INJECTION, SOLUTION INTRAMUSCULAR; INTRAVENOUS at 04:58

## 2021-12-02 RX ADMIN — HYDROCORTISONE SODIUM SUCCINATE 100 MG: 100 INJECTION, POWDER, FOR SOLUTION INTRAMUSCULAR; INTRAVENOUS at 11:27

## 2021-12-02 RX ADMIN — FENTANYL CITRATE 25 MCG: 50 INJECTION, SOLUTION INTRAMUSCULAR; INTRAVENOUS at 10:51

## 2021-12-02 RX ADMIN — PANTOPRAZOLE SODIUM 40 MG: 40 INJECTION, POWDER, LYOPHILIZED, FOR SOLUTION INTRAVENOUS at 18:37

## 2021-12-02 RX ADMIN — HYDROCORTISONE SODIUM SUCCINATE 100 MG: 100 INJECTION, POWDER, FOR SOLUTION INTRAMUSCULAR; INTRAVENOUS at 21:56

## 2021-12-02 RX ADMIN — EPINEPHRINE 1 MG: 0.1 INJECTION, SOLUTION INTRAVENOUS at 09:00

## 2021-12-02 RX ADMIN — HEPARIN SODIUM 2700 UNITS: 1000 INJECTION, SOLUTION INTRAVENOUS; SUBCUTANEOUS at 00:33

## 2021-12-02 RX ADMIN — HYDROMORPHONE HYDROCHLORIDE 1 MG: 1 INJECTION, SOLUTION INTRAMUSCULAR; INTRAVENOUS; SUBCUTANEOUS at 19:45

## 2021-12-02 ASSESSMENT — FIBROSIS 4 INDEX: FIB4 SCORE: 3.88

## 2021-12-02 ASSESSMENT — PAIN DESCRIPTION - PAIN TYPE: TYPE: ACUTE PAIN

## 2021-12-02 NOTE — ED NOTES
Pt transferred to Hospital bed. Able to stand and pivot with x1 assist. On monitor, call light in reach
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
## 2021-12-02 NOTE — PROGRESS NOTES
Hospital Medicine Daily Progress Note    Date of Service  12/2/2021    Chief Complaint  Donna Isaac is a 56 y.o. female admitted 12/1/2021 with abdominal pain    Hospital Course  56-year-old with previous medical history that includes coronary artery disease, traumatic brain injury, congestive heart failure, Valentín's disease, fibromyalgia rheumatica, MRSA sinusitis currently on IV antibiotics, recent upper GI bleed.    Patient presents for evaluation of abdominal pain in the epigastric region.  She had a work-up which included CT imaging of her chest and abdomen.  This demonstrated a PE with no evidence of right heart strain, as well as left upper extremity DVT, and evidence of acute pancreatitis.  Lab work demonstrated elevated lipase    Interval Problem Update  Called to bedside pt for hypotension and worsened CP  SBP 80    RR in 30s  Pt alert and interactive but anxious  Pale      Started fluid bolus, IV vasopressor (Levo), IV Hydrocortisone, PRBC transfusion, consulted Intensivist, admit to ICU  35mins critical care time at the bedside with interventions as noted    I have personally seen and examined the patient at bedside. I discussed the plan of care with patient and bedside RN.    Consultants/Specialty  pulmonary    Code Status  Full Code    Disposition  Patient is not medically cleared.   Anticipate discharge to to home with close outpatient follow-up.  I have placed the appropriate orders for post-discharge needs.    Review of Systems  Review of Systems   Unable to perform ROS: Unstable vital signs        Physical Exam  Temp:  [35.6 °C (96.1 °F)-35.9 °C (96.7 °F)] 35.9 °C (96.7 °F)  Pulse:  [] 65  Resp:  [12-44] 12  BP: ()/(40-87) 82/60  SpO2:  [88 %-100 %] 88 %    Physical Exam  Constitutional:       General: She is in acute distress.      Appearance: She is well-developed. She is obese. She is not diaphoretic.   HENT:      Head: Normocephalic and atraumatic.   Neck:       Vascular: No JVD.   Cardiovascular:      Rate and Rhythm: Regular rhythm. Tachycardia present.   Pulmonary:      Effort: Respiratory distress present.      Breath sounds: No stridor. No wheezing or rales.   Abdominal:      Palpations: Abdomen is soft.      Tenderness: There is no abdominal tenderness. There is no guarding or rebound.   Musculoskeletal:         General: No tenderness.      Right lower leg: No edema.      Left lower leg: No edema.   Skin:     General: Skin is warm and dry.      Coloration: Skin is pale.      Findings: No rash.   Neurological:      Mental Status: She is alert and oriented to person, place, and time. Mental status is at baseline.   Psychiatric:         Mood and Affect: Mood normal.         Thought Content: Thought content normal.         Fluids    Intake/Output Summary (Last 24 hours) at 12/2/2021 0707  Last data filed at 12/2/2021 0031  Gross per 24 hour   Intake 1500 ml   Output --   Net 1500 ml       Laboratory  Recent Labs     11/29/21  1130 12/01/21  1345   WBC 11.0* 17.4*   RBC 5.08 6.13*   HEMOGLOBIN 10.6* 12.6   HEMATOCRIT 37.7 45.2   MCV 74.2* 73.7*   MCH 20.9* 20.6*   MCHC 28.1* 27.9*   RDW 59.1* 59.6*   PLATELETCT 227 242   MPV 10.7 10.3     Recent Labs     11/29/21  1130 12/01/21  1345   SODIUM 131* 135   POTASSIUM 4.7 5.1   CHLORIDE 106 105   CO2 17* 19*   GLUCOSE 168* 106*   BUN 23* 27*   CREATININE 0.77 0.91   CALCIUM 7.9* 8.7     Recent Labs     12/01/21  1345 12/01/21  1832 12/01/21  2320   APTT 28.8 31.0 137.5*   INR 1.26* 1.35* 1.47*               Imaging  CT-CTA CHEST PULMONARY ARTERY W/ RECONS   Final Result      1.  Positive for pulmonary embolism located in multiple segmental and subsegmental branches      2.  Minimal elevation of RV/LV ratio      3.  Mild atelectasis      4.  Left PICC line present and appears appropriately located      5.  Findings were discussed with BLAINE VEGA on 12/1/2021 5:35 PM.                  CT-ABDOMEN-PELVIS WITH   Final Result       1.  Apparent inflammation of the pancreas with surrounding fat stranding and fluid suggesting pancreatitis. Recommend correlation with lipase.      2.  Diverticulosis without diverticulitis.      3.  Hepatic steatosis.      US-EXTREMITY VENOUS UPPER UNILAT LEFT   Final Result      DX-CHEST-PORTABLE (1 VIEW)   Final Result      1.  No acute cardiopulmonary abnormality identified.      2.  Left PICC line appears appropriately located      EC-ECHOCARDIOGRAM COMPLETE W/O CONT    (Results Pending)        Assessment/Plan  * PE (pulmonary thromboembolism) (HCC)- (present on admission)  Assessment & Plan  Cont IV heparin: if confirmed to have bleed will need to DC  Bedside Echo does not show R heart strain  Getting formal echo  Source L subclavian DVT associated with PICC    Shock (HCC)- (present on admission)  Assessment & Plan  Hemorrhagic vs obstructive (further embolization of her DVT) vs distributive  Bedside Echo by Intensivist does not show R heart strain: confirm with formal study  Giving IVF bolus and starting Levophed  Start IV hydrocortisone given chronic steroid use  Giving one unit PRBCs  Admit to ICU    Epigastric pain  Assessment & Plan  Secondary to acute pancreatitis, bowel rest, symptomatic management    Acute pancreatitis  Assessment & Plan  Idiopathic, IV fluids, symptomatic management, bowel rest    Sinusitis  Assessment & Plan  Patient reports daily IV infusion of antibiotics for MRSA sinusitis  Cont while in house  Get records    Leukocytosis  Assessment & Plan  Likely secondary to pancreatitis.  Hold antibiotics, follow-up CBC       VTE prophylaxis: heparin ppx    I have performed a physical exam and reviewed and updated ROS and Plan today (12/2/2021). In review of yesterday's note (12/1/2021), there are no changes except as documented above.

## 2021-12-02 NOTE — PROCEDURES
Arterial Line Insertion    Date/Time: 12/2/2021 10:24 AM  Performed by: Servando Rush M.D.  Authorized by: Servando Rush M.D.     Consent:     Consent obtained:  Emergent situation    Risks discussed:  Bleeding, pain and infection  Indications:     Indications: hemodynamic monitoring and multiple ABGs    Pre-procedure details:     Skin preparation:  2% Chlorhexidine  Procedure details:     Location:  R femoral    Needle gauge:  18 G    Placement technique:  Ultrasound guided    Number of attempts:  2    Transducer: waveform confirmed    Post-procedure details:     CMS:  Normal    Patient tolerance of procedure:  Tolerated well, no immediate complications  Comments:      Emergent right femoral mady placed during CPR to confirm pulses check and titration code blue drugs

## 2021-12-02 NOTE — ED NOTES
Pt became hypotensive heparin infusion paused, attempt for manual cuff pressure unsuccessful.  Dr. Yoder notified, orders provided for 1L NS bolus and trendelenburg position. Both orders in place.  Pt alert and oriented, drowsy, easily awakens to verbal and participating in conversation

## 2021-12-02 NOTE — ASSESSMENT & PLAN NOTE
Goal SBP less than 160  Hydralazine and labetalol as necessary to achieve blood pressure goals-if frequently required adjust enteral regimen  Hold amiloride, 5 mg daily  Resume doxazosin, 2 mg daily  Continue carvedilol, 25mg twice daily  Continue losartan 100 mg  Asked pharmacy to spread out the BP meds over the course the day and not give him all in the a.m.!  Lasix 20 PO daily, responding nicely to low-dose oral therapy

## 2021-12-02 NOTE — PROCEDURES
Central Line Insertion    Date/Time: 12/2/2021 10:22 AM  Performed by: Servando Rush M.D.  Authorized by: Servando Rush M.D.     Consent:     Consent obtained:  Emergent situation    Risks discussed:  Arterial puncture, bleeding and infection    Alternatives discussed:  No treatment  Pre-procedure details:     Skin preparation:  ChloraPrep  Anesthesia:     Anesthesia method:  None  Procedure details:     Location:  R femoral and L femoral    Patient position:  Flat    Procedural supplies:  Cordis    Catheter size: Cordis catheter placement.    Landmarks identified: yes      Ultrasound guidance: yes      Number of attempts:  3    Successful placement: yes    Post-procedure details:     Post-procedure:  Line sutured    Assessment:  Blood return through all ports    Patient tolerance of procedure:  Tolerated well, no immediate complications  Comments:      Patient in active CPR with acute blood loss emergent unsterile femoral cordis placed emergently using US guidance and anjelica connected.

## 2021-12-02 NOTE — ASSESSMENT & PLAN NOTE
Continue levothyroxine and liothyronine  Most recent TSH borderline low, follow-up with endocrinology-she sees Dr. Jeff
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
## 2021-12-02 NOTE — ASSESSMENT & PLAN NOTE
Imaging reveals LUE DVT associated with her PICC line and a distal LLE DVT - 12/1 & 12/2 studies  Diagnosed on 12/2 s/p IVC filter placement on 12/6 (AC was C/I due to intra-abdominal bleeding, shock)  Resumed heparin drip, intermittently from 12/13-12/16 due to concern of GI bleeding; hemodynamics improved & no bleeding>>>transitioned to lovenox on 12/18  Hold AC/lovenox currently due to hypotension and possible GIB  H&H stable; Monitor and transfuse to keep Hb>7

## 2021-12-02 NOTE — ASSESSMENT & PLAN NOTE
Resolved  Likely ATN secondary to shock, Cr at baseline currently  Monitor renal function and urine output  Avoid nephrotoxins and renal dose medications

## 2021-12-02 NOTE — ASSESSMENT & PLAN NOTE
Diagnosed on 12/2 s/p IVC filter placement on 12/6 (AC was C/I due to intra-abdominal bleeding, shock)  Resumed heparin drip, hemodynamics improved & no bleeding>>>transitioned to lovenox on 12/19  Hold AC/lovenox currently due to hypotension and possible GIB  H&H stable; Monitor and transfuse to keep Hb>7

## 2021-12-02 NOTE — PROGRESS NOTES
ED Provider Progress Note    Donna Isaac is a 56 y.o. F.  This patient was already hospitalized and under the care of the hospitalist service and ICU service.  I happened to be walking by the room when CPR was initiated and I offered assistance.  I led the resuscitation efforts while awaiting ICU service to arrive.  Dr. Rush from the ICU service came shortly after initiation of the code - he is much more familiar with regards to the patient's complicated clinical status and prior care up to this point and led further resuscitation efforts.    I assisted with resuscitation and performed intubation.      Intubation Procedure Note    Indication: Respiratory failure, potential airway compromise, comatose state and airway protection    Consent: Unable to be obtained due to the emergent nature of this procedure.    Medications Used: None    Procedure: The patient was placed in the appropriate position.  Cricoid pressure was not required.  Intubation was performed by indirect laryngoscopy using a glide scope and an 8.0 cuffed endotracheal tube.  The cuff was then inflated and the tube was secured appropriately at a distance of 23 cm to the dental ridge.  Initial confirmation of placement included bilateral breath sounds, an end tidal CO2 detector, absence of sounds over the stomach, tube fogging, adequate chest rise and adequate pulse oximetry reading.  A chest x-ray to verify correct placement of the tube showed a right mainstem intubation and the tube was repositioned appropriately and x-ray was redone that confirmed adequate placement.    The patient tolerated the procedure well.     Complications: None

## 2021-12-02 NOTE — ASSESSMENT & PLAN NOTE
History of gastrointestinal hemorrhage with no source identified-no recurrence/monitoring  Possible gastrointestinal hemorrhage provoked by IV heparin for pulmonary embolism - no gross blood noted in stool - positive for occult blood  Continue PPI twice daily  GI consult noted, if rebleeds f/u GI consultation  Trend hemoglobin and transfuse PRBCs to keep hemoglobin greater than 7  Heparin subcu PPx restarted 12/10, trial IV heparin no bolus starting 12/13, consider enteral agent if no bleeding after several days  Status post IVC filter off anticoagulation for A. Fib  HGB unchanged, no clinical signs of bleeding-continue to monitor  Reverse with protamine or FFC as needed, hold s needed  Hypotensive episode 12/16 likely related to BP meds/diuresis-Heparin held for night and hemoglobin unchanged and no evidence of bleeding, resuming heparin drip without bolus  Hemoglobin stable  Continue full dose Lovenox, oral therapy soon when insurance/ let us know warfarin versus NOAC

## 2021-12-02 NOTE — ASSESSMENT & PLAN NOTE
On 12/2 s/p cardiac arrest secondary to hemorrhagic shock with acute gastrointestinal blood loss an acute pancreatitis   Shock resolved, weaned off norepinephrine    On 12/15 Recurrent shock -likely due to hypovolemia, multiple antihypertensives and IV narcotics  Responded to Nathan-Synephrine pops and norepinephrine infusion along with 1 L saline bolus and stress dose steroid    On 12/23 Recurrent shock-likely due to hypovolemia or ??acute GI blood loss  S/p 1 L LR bolus  Hold antihypertensives; stopped diuresis  Continue low dose levophed, titrate to keep MAP 60-65  Started on stress dose steroid and IV PPI  Lactate wnl, Hb stable  Hold lovenox due to possible GI bleeding as pt is c/o LLQ abdominal pain  CT A/P pending.

## 2021-12-02 NOTE — DISCHARGE PLANNING
Referral: Code Blue responce    Intervention: SW responded to Code Blue call to ER room 21.  As SW arrived this SW found Pt's Spouse Colin standing outside of the room.  SW provided emotional support and called  per Spouse's request.     Pt's Friend Kylee arrived to provide emotional support to Pt's Spouse.  SW escorted them both to the [facility] waiting area.     Plan: SW provided floor SW update on Pt condition and family in the waiting room.

## 2021-12-02 NOTE — PROGRESS NOTES
Called by nurse for code blue    I was waiting at the bedside for patient to come up to [facility] T612.     Patient underwent extensive resus please see code blue note. Myself an the ER doctor preformed resus on this patient. When they called me I emergently asked for massive transfusion protocol. Her hg had drop from 12->8 when I check labs at the time nurse called me. She underwent 6 rounds or CPR, mostly PEA and asystole but also VT s/p shock. She was given multiple epi, bicarb, calcium. She had emergent cordis and femoral arterial line placed, IO    She was given 6 units or PRBC, 2 FFP, 1 PLT in all.     She was placed on epi gtt and was already being maintained on norepinephrine gtt  She was intubated by the ER doctor.      was at bedside and I was actual talking with him to come to bedside prior to stopping efforts and at last check before calling code patient had a good waveform and map on femoral arterial tracing.     She will be getting emergent CTA of the abdomen to look for acute blood loss source, she will be getting a repeat echo post resuscitation, stat CBC, ionized calcium, CMP, PT/INR/PTT, teg w/ mapping. CXR to confirm ET placement and ABG.     Echo official with RV dysfunction will start flolan post CPR and resus with massive transfusion protocol    GI prophylaxis with protonix BID until source of bleeding none    Palliative care consultation  Lower ext doppler may need IVC filter  Antimicrobials and 2x blood cx with vanco x1 and meropenem due to her allergies    Patient remains in critical condition from code blue and aggressive resus as described above with blood, pressor titration of norepinephrine and epi gtt. Critical care time provided was 80 minutes in addition to 50 minutes preformed earlier. This excludes all separate billable procedures.     Servando Rush MD  Critical Care Medicine

## 2021-12-02 NOTE — ED NOTES
Pt began reporting trouble breathing, tachypnea noted. Pt became hypotensive. NS bolus initiated  MD notified

## 2021-12-02 NOTE — ASSESSMENT & PLAN NOTE
As per CT A/P on 12/2; was evaluated by Surgery has  No indication for surgery at that time  Intermittently on heparin from 12/15 for DVT/PE/PAF>>transitioned to lovenox on 12/18  On 12/23 became hypotensive; possible GIB  Hold AC; repeat CT A/P pending   [Appropriate Age Development] : development appropriate for age [Fever Above 102] : no fever [Wgt Loss (___ Lbs)] : no recent weight loss [Rash] : no rash [Heart Problems] : no heart problems [Congestion] : no congestion [Joint Swelling] : no joint swelling [Joint Pains] : no arthralgias [Sleep Disturbances] : ~T no sleep disturbances

## 2021-12-02 NOTE — ED NOTES
0858  Pt became unresponsive, pale, HR 80s to 50s.  Pt placed in trendelenburg, BP unobtainable.  Pt became pulseless.  CPR initiated.    0900 PEA, 1mg epi given, CPR continued    0902  1g calcium given IV    0903  Pulse check, pt pulseless, CPR resumed    0904 1amp bicarb given.    0905 1mg epi IV given, IO placed to L lower leg.     0906  Pulse check, pt pulseless, CPR resumed    0907  Pt intubated, 8.0 tube, 23 @ teeth    0908  Pulse check, pt pulseless, CPR resumed    0909  1mg epi IV given    0910  Pulse check, pt pulseless, CPR resumed.    0910  Pt has a faint pulse, FS 88.  Coris placed to L groin.    0914  [place] initiated with 2 units PRBC, units L and E.    0915  Levo increased per MAR.  Pt has pulse.    0917  Pt became pulseless, PEA on monitor.  CPR re-started.  1mg epi IV given    0918  2g calcium IV given,  Art line placed to R groin.    0920  Pulse check, pt in VFIB, 1 shock given, CPR resumed.  Epi 1mg IV given.     0921  Pulse check, Pt pulseless, PEA, CPR resumed    0923  Pulse check, Pt pulseless, asystole, CPR resumed.      0924  50 mEq Sodium Bicarb given IVP.    0925  1mg epi given IVP    0926  Pulse check, pt pulseless, asystole.    0928  Pulse check, ROSC.  Verified MAP per Art line.      0944  Epi gtts initiated, levo d/c'd, pt opening eyes and trying to speak.  Versed given per MAR.  ICU RN, Dr Rush and RT bedside.  X-ray bedside to confirm tube placement.  Care relinquished to ICU.  Pt to transfer to CT then T 612.  ECHO aware repeat exam at T612 and to meet patient there on arrival.  Pt left dept with ICU RN.    NOTE:  2 units blood from trauma refrigerator given (units L&E) as well as 1 full RED BOX  (PRBC x 4 units & FFP x 4 units via rapid [place] in ED) and 1 unit platelets infusing on txfr out of ED.  See paper transfusion records.

## 2021-12-02 NOTE — ASSESSMENT & PLAN NOTE
Trend platelet count and thromboelastogram with platelet mapping as needed  Transfuse platelets as required  Count normalized 12/9-remains normal  Still unchanged since restarting heparin

## 2021-12-02 NOTE — PROGRESS NOTES
Critical Care Progress Note    Date of admission  12/1/2021    Chief Complaint  56 y.o. female admitted 12/1/2021 with acute pancreatitis, pulmonary embolism.  She has a history of prior gastrointestinal bleeding from unknown source, MRSA sinusitis on outpatient antibiotics, Mobile's disease, GERD, primary hypertension, hemochromatosis, hypothyroidism, traumatic brain injury, myocardial infarction and fibromyalgia.    Hospital Course      12/2 -    vent day 1.  Trend hemoglobin and platelet count and transfuse blood products as required.  Full vent support.  Inhaled Flolan for RV dysfunction following cardiac arrest.      Interval Problem Update  Reviewed last 24 hour events:      Assumed care in [facility]      Review of Systems  Review of Systems   Unable to perform ROS: Acuity of condition        Vital Signs for last 24 hours   Temp:  [36.1 °C (97 °F)] 36.1 °C (97 °F)  Pulse:  [16-88] 78  Resp:  [11-44] 32  BP: ()/(40-82) 97/69  SpO2:  [77 %-100 %] 96 %    Hemodynamic parameters for last 24 hours       Respiratory Information for the last 24 hours  Vent Mode: APVCMV  Rate (breaths/min): 32  Vt Target (mL): 330  PEEP/CPAP: 10  MAP: 16  Control VTE (exp VT): 351    Physical Exam   Physical Exam  Constitutional:       Appearance: She is not diaphoretic.      Comments: On ventilator   HENT:      Head: Normocephalic.      Nose: Nose normal.      Mouth/Throat:      Mouth: Mucous membranes are moist.      Pharynx: Oropharynx is clear.   Eyes:      Conjunctiva/sclera: Conjunctivae normal.      Pupils: Pupils are equal, round, and reactive to light.   Cardiovascular:      Pulses: Normal pulses.      Comments: Sinus rhythm  Pulmonary:      Breath sounds: No wheezing or rales.   Abdominal:      General: There is distension.      Tenderness: There is no abdominal tenderness.   Musculoskeletal:      Cervical back: Normal range of motion.   Neurological:      Comments: Sedated, arouses and nods.         Medications  Current  Facility-Administered Medications   Medication Dose Route Frequency Provider Last Rate Last Admin   • norepinephrine (Levophed) 8 mg in 250 mL NS infusion (premix)  0-50 mcg/min Intravenous Continuous Eligio Del Castillo M.D.   Stopped at 12/02/21 0944   • hydrocortisone sodium succinate PF (Solu-CORTEF) 100 MG injection 100 mg  100 mg Intravenous Q8HRS Rudolph Foy D.OJelani   100 mg at 12/02/21 1508   • pantoprazole (PROTONIX) injection 40 mg  40 mg Intravenous BID Fausto Jay M.D.   40 mg at 12/02/21 1126   • Respiratory Therapy Consult   Nebulization Continuous RT Servando Rush M.D.       • MD Alert...ICU Electrolyte Replacement per Pharmacy   Other PHARMACY TO DOSE Servando Rush M.D.       • lidocaine (XYLOCAINE) 1 % injection 2 mL  2 mL Tracheal Tube Q30 MIN PRN Servando Rush M.D.       • epoprostenol (FLOLAN) 1.5 mg in glycine diluent for flolan 50 mL for Inhalation  0.01-0.05 mcg/kg/min Inhalation Continuous Servando Rush M.D. 9.87 mL/hr at 12/02/21 1157 0.05 mcg/kg/min at 12/02/21 1157   • dexmedetomidine (PRECEDEX) 400 mcg/100mL NS premix infusion  0.1-1.5 mcg/kg/hr Intravenous Continuous Eligio Del Castillo M.D. 37 mL/hr at 12/02/21 1415 1.5 mcg/kg/hr at 12/02/21 1415   • HYDROmorphone (DILAUDID) 0.2 mg/mL in 50mL NS (Continuous Infusion)   Intravenous Continuous Eligio Del Castillo M.D. 5 mL/hr at 12/02/21 1420 1 mg/hr at 12/02/21 1420   • HYDROmorphone (Dilaudid) injection 0.5-2 mg  0.5-2 mg Intravenous Q HOUR PRN Eligio Del Castillo M.D.   2 mg at 12/02/21 1421   • senna-docusate (PERICOLACE or SENOKOT S) 8.6-50 MG per tablet 2 Tablet  2 Tablet Oral BID David Yoder M.D.        And   • polyethylene glycol/lytes (MIRALAX) PACKET 1 Packet  1 Packet Oral QDAY PRN David Yoder M.D.        And   • magnesium hydroxide (MILK OF MAGNESIA) suspension 30 mL  30 mL Oral QDAY PRN David Yoder M.D.        And   • bisacodyl (DULCOLAX) suppository 10 mg  10 mg Rectal  QDAY PRN Dvaid Yoder M.D.       • Pharmacy Consult Request ...Pain Management Review 1 Each  1 Each Other PHARMACY TO DOSE David Yoder M.D.       • ondansetron (ZOFRAN) syringe/vial injection 4 mg  4 mg Intravenous Q4HRS PRN David Yoder M.D.       • ondansetron (ZOFRAN ODT) dispertab 4 mg  4 mg Oral Q4HRS PRN David Yoder M.D.       • promethazine (PHENERGAN) suppository 12.5-25 mg  12.5-25 mg Rectal Q4HRS PRN David Yoder M.D.       • insulin regular (HumuLIN R,NovoLIN R) injection  1-6 Units Subcutaneous Q6HRS David Yoder M.D.        And   • dextrose 50% (D50W) injection 50 mL  50 mL Intravenous Q15 MIN PRN David Yoder M.D.   50 mL at 12/02/21 1238       Fluids    Intake/Output Summary (Last 24 hours) at 12/2/2021 1538  Last data filed at 12/2/2021 0031  Gross per 24 hour   Intake 1000 ml   Output --   Net 1000 ml       Laboratory  Recent Labs     12/02/21  1034 12/02/21  1311   ISTATAPH 6.967* 7.141*   ISTATAPCO2 51.4* 42.8*   ISTATAPO2 221* 170*   ISTATATCO2 13* 16*   MHIQIIT3RYH 99 99   ISTATARTHCO3 11.8* 14.6*   ISTATARTBE -20* -14*   ISTATTEMP see below 35.7 C   ISTATFIO2 100 80   ISTATSPEC Arterial Arterial   ISTATAPHTC  --  7.157*   AUTYOQQT0UT  --  163*         Recent Labs     12/02/21  0617 12/02/21  0936 12/02/21  1144   SODIUM 134* 141 141   POTASSIUM 5.8* 6.4* 5.9*   CHLORIDE 106 110 109   CO2 14* 12* 13*   BUN 35* 31* 31*   CREATININE 1.65* 1.59* 1.67*   CALCIUM 7.7* 11.0* 9.6     Recent Labs     12/01/21  1345 12/01/21  1345 12/01/21  1623 12/02/21  0617 12/02/21  0936 12/02/21  1144   ALTSGPT 117*  --   --  81* 39  --    ASTSGOT 72*  --   --  85* 68*  --    ALKPHOSPHAT 226*  --   --  144* 75  --    TBILIRUBIN 0.6  --   --  0.7 0.3  --    LIPASE  --   --  1275*  --   --   --    GLUCOSE 106*   < >  --  78 127* 46*    < > = values in this interval not displayed.     Recent Labs     12/01/21  1345 12/01/21  1345 12/02/21  0617 12/02/21  0806 12/02/21  0936 12/02/21  1144   WBC  17.4*   < >  --  23.7* 13.9* 11.2*   NEUTSPOLYS 85.40*  --   --   --  43.50*  --    LYMPHOCYTES 7.60*  --   --   --  17.40*  --    MONOCYTES 5.90  --   --   --  5.20  --    EOSINOPHILS 0.20  --   --   --  0.00  --    BASOPHILS 0.20  --   --   --  0.90  --    ASTSGOT 72*  --  85*  --  68*  --    ALTSGPT 117*  --  81*  --  39  --    ALKPHOSPHAT 226*  --  144*  --  75  --    TBILIRUBIN 0.6  --  0.7  --  0.3  --     < > = values in this interval not displayed.     Recent Labs     12/01/21  1345 12/01/21  1345 12/01/21  1832 12/01/21  2320 12/02/21  0806 12/02/21  0936 12/02/21  1144   RBC 6.13*   < >  --   --  3.92* 3.84* 4.27   HEMOGLOBIN 12.6   < >  --   --  8.2* 10.5* 11.5*   HEMATOCRIT 45.2   < >  --   --  29.7* 35.6* 37.1   PLATELETCT 242   < >  --   --  160* 57* 121*   PROTHROMBTM 15.5*   < > 16.3* 17.4*  --  26.2*  --    APTT 28.8  --  31.0 137.5*  --   --   --    INR 1.26*   < > 1.35* 1.47*  --  2.50*  --     < > = values in this interval not displayed.       Imaging  X-Ray:  I have personally reviewed the images and compared with prior images. and My impression is: Increased edema    Assessment/Plan  * Cardiac arrest (HCC)  Assessment & Plan  PEA, asystole and ventricular tachycardia in ED  ROSC after 7 rounds of CPR, IV epinephrine, IV bicarbonate solution and massive transfusion protocol  Echo with RV dysfunction and RVSP of 50 mmHg - inhaled Flolan  Maintain normothermia    Gastrointestinal hemorrhage  Assessment & Plan  History of gastrointestinal hemorrhage with no source identified  Suspect gastrointestinal hemorrhage provoked by IV heparin for pulmonary embolism  IV pantoprazole twice daily  Trend hemoglobin and transfuse PRBCs to keep hemoglobin greater than 7    Shock (HCC)- (present on admission)  Assessment & Plan  I suspect hemorrhagic shock with acute gastrointestinal blood loss  History of gastrointestinal hemorrhage with no source identified - clinically, bleeding resolved 2 weeks prior to  presentation  Suspect gastrointestinal hemorrhage provoked by IV heparin for pulmonary embolism  S/P massive transfusion protocol on 12/2    Acute respiratory failure with hypoxia (HCC)- (present on admission)  Assessment & Plan  Intubated 12/2  All of the appropriate ventilator bundles are in place  SAT/SBT as appropriate  Mobility level 1    Hemoperitoneum  Assessment & Plan  Evaluated by Dr. David Steele  No indication for surgery at this time  Trend hemoglobin and transfuse PRBCs to keep hemoglobin greater than 7    DVT (deep venous thrombosis) (HCC)  Assessment & Plan  Imaging reveals LUE DVT associated with her PICC line and LLE DVT  Anticoagulation strictly contraindicated due to acute gastrointestinal hemorrhage and hemoperitoneum  She will require an IVC filter    Acute pancreatitis  Assessment & Plan  Bowel rest  Trend lipase    PE (pulmonary thromboembolism) (HCC)- (present on admission)  Assessment & Plan  Anticoagulation is contraindicated  She will require IVC filter placement    Acute blood loss anemia- (present on admission)  Assessment & Plan  Suspect gastrointestinal source of blood loss as well as acute hemoperitoneum  Trend hemoglobin and transfuse PRBCs to keep hemoglobin greater than 7    Thrombocytopenia (HCC)- (present on admission)  Assessment & Plan  Trend platelet count and thromboelastogram with platelet mapping  Transfuse platelets as required    Adrenal insufficiency (Valentín's disease) (HCC)- (present on admission)  Assessment & Plan  High-dose hydrocortisone, 100 mg IV every 8 hours    Acute kidney injury (HCC)- (present on admission)  Assessment & Plan  Suspect ATN due to shock in lady with underlying atrophic kidneys  Monitor renal function and urine output  Avoid nephrotoxins and renal dose medications    Primary hypertension- (present on admission)  Assessment & Plan  Hold antihypertensives    Hemochromatosis- (present on admission)  Assessment & Plan  History of    MRSA  sinusitis  Assessment & Plan  On outpatient IV antibiotics - LUE PICC in place  Begin vancomycin    Hypothyroidism- (present on admission)  Assessment & Plan  Continue levothyroxine    Obesity (BMI 35.0-39.9 without comorbidity)- (present on admission)  Assessment & Plan          VTE:  Contraindicated  Ulcer: PPI  Lines: Central Line  Ongoing indication addressed, Arterial Line  Ongoing indication addressed and Perez Catheter  Ongoing indication addressed    I have performed a physical exam and reviewed and updated ROS and Plan today (12/2/2021). In review of yesterday's note (12/1/2021), there are no changes except as documented above.     I have assessed and reassessed her respiratory status with ventilator adjustments, airway mechanics, ventilator waveforms, blood pressure, hemodynamics, cardiovascular status, urine output and neurologic status.  She is at high risk for worsening respiratory, cardiovascular, gastrointestinal, renal and CNS system dysfunction.    Discussed patient condition and risk of morbidity and/or mortality with RN, RT, Pharmacy, Charge nurse / hot rounds and QA team     The patient remains critically ill.  Critical care time = 80 minutes in directly providing and coordinating critical care and extensive data review.  No time overlap and excludes procedures.    Time spent is in addition of the time spent by Dr. Rush earlier today.    Eligio Del Castillo MD  Pulmonary and Critical Care Medicine

## 2021-12-02 NOTE — PROGRESS NOTES
Called by radiology with moderate amount of hemoperitoneum with no source of bleeding or active extravasation.     Consulted acute care surgery for evaluation  Ph 6.99/55/221 -20 will increase minute ventilation  3 amps of bicarb  Flolan for right heart support, epi gtt  Lasix 40mg IV for hyperkalemia and to help RV  Awake moving extremities, fentanyl pushes and dex gtt for sedation  Updated  at bedside asked about if she was to code again, her quality of life and how extensive measure she would want. I have place palliative care consultation. 3 children are in route to ER.     Discussed with Dr Del Castillo and will take over care at this time    Will need to remove cordis and femoral artline when stable as these were not sterile lines.   Continue to monitor CBC, BMP and follow up teg w/mapping PT/INR/PTT.     Serial ABG    Servando Rush MD  Critical Care Medicine

## 2021-12-02 NOTE — ASSESSMENT & PLAN NOTE
Hx of PE 4/2021 status post 3 months of eliquis, which was stopped due to GI bleeding.   Recurrent PE on admission per CTA, s/p anticoagulation attempts, given recurrent bleeding anticoagulation discontinued and is contraindicated at this time  Patient underwent IVC placement 12/6  Not a candidate for further anticoagulation due to bleeding

## 2021-12-02 NOTE — ASSESSMENT & PLAN NOTE
Suspect gastrointestinal source of blood loss as well as acute hemoperitoneum as per CT A/P on 12/2 s/p PRBC transfusion  No recurrence clinically  Baseline Hb 7-9 during hospitalization  Became hypotensive on 12/23; possible GI bleeding  Hold lovenox  Monitor H&H; transfuse to keep Hb>7

## 2021-12-02 NOTE — PROCEDURES
Date of service:  12/2/2021    Title:  Central venous catheter placement - internal jugular vein    Indication:  Shock    Narrative:    A time out was performed identifying the correct patient, correct procedure and correct location prior to this procedure.    The left neck was prepped with chlorhexidine and draped in the usual sterile fashion.  1% Xylocaine solution was used for topical anesthesia.  A triple lumen central venous catheter was placed into the left internal jugular vein under ultrasound guidance using the technique described by Joi without difficulty or apparent complication.  The line was sutured into place and a sterile dressing was placed over the line.  All 3 ports flush and return venous blood easily.  The patient tolerated the procedure quite nicely.  No complications are apparent.  A STAT CXR is ordered to confirm placement.      Eligio Del Castillo MD  Pulmonary and Critical Care Medicine

## 2021-12-02 NOTE — ED NOTES
Kristie from Lab called with critical result of aPTT 137.5 at 0020. Critical lab result read back to Kristie.   Dr. Goodrich notified of critical lab result at 0027.  Critical lab result read back by Dr. Goodrich.

## 2021-12-02 NOTE — PROGRESS NOTES
Spiritual Care Note    Patient Information     Patient's Name: Donna Isaac   MRN: [id number]    YOB: 1965   Age and Gender: 56 y.o. female   Service Area: ER   Room (and Bed):  21/21 [facility]   Ethnicity or Nationality: White   Primary Language: English   Uatsdin/Spiritual preference: Latter day   Place of Residence: [place]   Family/Friends/Others Present:    Clinical Team Present: Jewels Batres   Medical Diagnosis(-es)/Procedure(s): pulmonary thromboembolism   Code Status: Full Code    Date of Admission: 12/1/2021   Length of Stay: 1 days        Spiritual Care Provider Information:  Chaplain CARLENE Rodriguez  [phone]   [email or web address]  Total time : 60 minutes    Spiritual Screen Results:    Gen Nursing  Spiritual Screen  Was spiritual care education provided to the patient?: Yes  Cultural/Spiritual Needs During Care: Familial  Familial Considerations: Other (Comment) ( was supported by chaplains while waiting for wife's )  Sources of Hope/Yuko: Family,Prayer     Palliative Care  PC Uatsdin/Spiritual Screening  Was spiritual care education provided to the patient?: Yes      Encounter/Request Information  Encounter/Request Type   Visited With: Family  Nature of the Visit: Initial,On shift  Continue Visiting:  (Depending on patient's condition, and family request.)  Crisis Visit: Critical care (Code blue)  Referral From/ Origin of Request: Verbal staff    Religous Needs/Values  Uatsdin Needs Visit  Uatsdin Needs: Prayer    Spiritual Assessment   Spiritual Care Encounters  Observations/Symptoms: Tearful,Confusion,Anxious,Pain  Interacton/Conversation: Talk with , pryaing with , providing support to .  Assessment: Need,Distress,Despair  Need: Seeking Spiritual Assistance and Support  Distress: Anxiety about the Future,Grief/Loss/Bereavement,Overwhelmed  Despair: Fear of Death  Intended Effects: Convey a Calming  Presence,Demonstrate Caring and Concern,Yaneli Affirmation,Helping Someone Feel Comforted,Lessen Anxiety,Preserve Dignity and Respect,Promote a Sense of Peace  Interventions:  Vu pray with .  Outcomes: Emotional Release,Coping,Spiritual Comfort  Plan: Continue with Family    Notes:

## 2021-12-02 NOTE — ED NOTES
Pt unable to complete CT scan d/t pain in LUQ that radiates to shoulder/chest and SOB when she lays flat. ERP notified. Medicated for 9/10 pain, see MAR.

## 2021-12-03 ENCOUNTER — APPOINTMENT (OUTPATIENT)
Dept: RADIOLOGY | Facility: MEDICAL CENTER | Age: 56
DRG: 981 | End: 2021-12-03
Attending: INTERNAL MEDICINE
Payer: MEDICARE

## 2021-12-03 PROBLEM — E83.51 HYPOCALCEMIA: Status: ACTIVE | Noted: 2021-12-03

## 2021-12-03 PROBLEM — E83.42 HYPOMAGNESEMIA: Status: ACTIVE | Noted: 2021-12-03

## 2021-12-03 LAB
ALBUMIN SERPL BCP-MCNC: 2.3 G/DL (ref 3.2–4.9)
ALBUMIN/GLOB SERPL: 1.4 G/DL
ALP SERPL-CCNC: 112 U/L (ref 30–99)
ALT SERPL-CCNC: 120 U/L (ref 2–50)
ANION GAP SERPL CALC-SCNC: 16 MMOL/L (ref 7–16)
ANION GAP SERPL CALC-SCNC: 18 MMOL/L (ref 7–16)
ANION GAP SERPL CALC-SCNC: 19 MMOL/L (ref 7–16)
ANISOCYTOSIS BLD QL SMEAR: ABNORMAL
AST SERPL-CCNC: 345 U/L (ref 12–45)
BASE EXCESS BLDA CALC-SCNC: -6 MMOL/L (ref -4–3)
BASOPHILS # BLD AUTO: 0 % (ref 0–1.8)
BASOPHILS # BLD: 0 K/UL (ref 0–0.12)
BILIRUB SERPL-MCNC: 1.5 MG/DL (ref 0.1–1.5)
BODY TEMPERATURE: ABNORMAL DEGREES
BREATHS SETTING VENT: 32
BUN SERPL-MCNC: 37 MG/DL (ref 8–22)
BUN SERPL-MCNC: 40 MG/DL (ref 8–22)
BUN SERPL-MCNC: 42 MG/DL (ref 8–22)
BURR CELLS BLD QL SMEAR: NORMAL
CA-I SERPL-SCNC: 1 MMOL/L (ref 1.1–1.3)
CALCIUM SERPL-MCNC: 7.5 MG/DL (ref 8.5–10.5)
CALCIUM SERPL-MCNC: 7.5 MG/DL (ref 8.5–10.5)
CALCIUM SERPL-MCNC: 7.9 MG/DL (ref 8.5–10.5)
CFT BLD TEG: 8 MIN (ref 4.6–9.1)
CFT P HPASE BLD TEG: 7 MIN (ref 4.3–8.3)
CHLORIDE SERPL-SCNC: 111 MMOL/L (ref 96–112)
CHLORIDE SERPL-SCNC: 112 MMOL/L (ref 96–112)
CHLORIDE SERPL-SCNC: 112 MMOL/L (ref 96–112)
CLOT ANGLE BLD TEG: 76.5 DEGREES (ref 63–78)
CLOT LYSIS 30M P MA LENFR BLD TEG: 0 % (ref 0–2.6)
CO2 BLDA-SCNC: 18 MMOL/L (ref 20–33)
CO2 SERPL-SCNC: 15 MMOL/L (ref 20–33)
CO2 SERPL-SCNC: 15 MMOL/L (ref 20–33)
CO2 SERPL-SCNC: 16 MMOL/L (ref 20–33)
CREAT SERPL-MCNC: 2.37 MG/DL (ref 0.5–1.4)
CREAT SERPL-MCNC: 2.49 MG/DL (ref 0.5–1.4)
CREAT SERPL-MCNC: 2.83 MG/DL (ref 0.5–1.4)
CT.EXTRINSIC BLD ROTEM: 0.9 MIN (ref 0.8–2.1)
DELSYS IDSYS: ABNORMAL
END TIDAL CARBON DIOXIDE IECO2: 19 MMHG
EOSINOPHIL # BLD AUTO: 0 K/UL (ref 0–0.51)
EOSINOPHIL NFR BLD: 0 % (ref 0–6.9)
ERYTHROCYTE [DISTWIDTH] IN BLOOD BY AUTOMATED COUNT: 57.7 FL (ref 35.9–50)
ERYTHROCYTE [DISTWIDTH] IN BLOOD BY AUTOMATED COUNT: 57.9 FL (ref 35.9–50)
ERYTHROCYTE [DISTWIDTH] IN BLOOD BY AUTOMATED COUNT: 61.4 FL (ref 35.9–50)
ERYTHROCYTE [DISTWIDTH] IN BLOOD BY AUTOMATED COUNT: 62.6 FL (ref 35.9–50)
GLOBULIN SER CALC-MCNC: 1.7 G/DL (ref 1.9–3.5)
GLUCOSE BLD-MCNC: 107 MG/DL (ref 65–99)
GLUCOSE BLD-MCNC: 117 MG/DL (ref 65–99)
GLUCOSE BLD-MCNC: 193 MG/DL (ref 65–99)
GLUCOSE BLD-MCNC: 215 MG/DL (ref 65–99)
GLUCOSE BLD-MCNC: 47 MG/DL (ref 65–99)
GLUCOSE BLD-MCNC: 94 MG/DL (ref 65–99)
GLUCOSE SERPL-MCNC: 113 MG/DL (ref 65–99)
GLUCOSE SERPL-MCNC: 83 MG/DL (ref 65–99)
GLUCOSE SERPL-MCNC: 88 MG/DL (ref 65–99)
HCO3 BLDA-SCNC: 17.2 MMOL/L (ref 17–25)
HCT VFR BLD AUTO: 31.6 % (ref 37–47)
HCT VFR BLD AUTO: 33.3 % (ref 37–47)
HCT VFR BLD AUTO: 33.8 % (ref 37–47)
HCT VFR BLD AUTO: 35.9 % (ref 37–47)
HGB BLD-MCNC: 10.9 G/DL (ref 12–16)
HGB BLD-MCNC: 11 G/DL (ref 12–16)
HGB BLD-MCNC: 11.6 G/DL (ref 12–16)
HGB BLD-MCNC: 12.5 G/DL (ref 12–16)
HOROWITZ INDEX BLDA+IHG-RTO: 217 MM[HG]
INR PPP: 1.86 (ref 0.87–1.13)
LIPASE SERPL-CCNC: 548 U/L (ref 11–82)
LYMPHOCYTES # BLD AUTO: 0 K/UL (ref 1–4.8)
LYMPHOCYTES NFR BLD: 0 % (ref 22–41)
MAGNESIUM SERPL-MCNC: 1.5 MG/DL (ref 1.5–2.5)
MANUAL DIFF BLD: NORMAL
MCF BLD TEG: 67.9 MM (ref 52–69)
MCF.PLATELET INHIB BLD ROTEM: 46.5 MM (ref 15–32)
MCH RBC QN AUTO: 26.6 PG (ref 27–33)
MCH RBC QN AUTO: 27.6 PG (ref 27–33)
MCH RBC QN AUTO: 27.7 PG (ref 27–33)
MCH RBC QN AUTO: 27.7 PG (ref 27–33)
MCHC RBC AUTO-ENTMCNC: 32.5 G/DL (ref 33.6–35)
MCHC RBC AUTO-ENTMCNC: 34.5 G/DL (ref 33.6–35)
MCHC RBC AUTO-ENTMCNC: 34.8 G/DL (ref 33.6–35)
MCHC RBC AUTO-ENTMCNC: 34.8 G/DL (ref 33.6–35)
MCV RBC AUTO: 79.3 FL (ref 81.4–97.8)
MCV RBC AUTO: 79.4 FL (ref 81.4–97.8)
MCV RBC AUTO: 80.2 FL (ref 81.4–97.8)
MCV RBC AUTO: 81.8 FL (ref 81.4–97.8)
METAMYELOCYTES NFR BLD MANUAL: 2.6 %
MODE IMODE: ABNORMAL
MONOCYTES # BLD AUTO: 0.13 K/UL (ref 0–0.85)
MONOCYTES NFR BLD AUTO: 0.9 % (ref 0–13.4)
MORPHOLOGY BLD-IMP: NORMAL
MYELOCYTES NFR BLD MANUAL: 0.9 %
NEUTROPHILS # BLD AUTO: 13.96 K/UL (ref 2–7.15)
NEUTROPHILS NFR BLD: 89.5 % (ref 44–72)
NEUTS BAND NFR BLD MANUAL: 6.1 % (ref 0–10)
NRBC # BLD AUTO: 0.08 K/UL
NRBC BLD-RTO: 0.5 /100 WBC
O2/TOTAL GAS SETTING VFR VENT: 90 %
PA AA BLD-ACNC: ABNORMAL % (ref 0–11)
PA ADP BLD-ACNC: ABNORMAL % (ref 0–17)
PCO2 BLDA: 27.8 MMHG (ref 26–37)
PCO2 TEMP ADJ BLDA: 29.8 MMHG (ref 26–37)
PEEP END EXPIRATORY PRESSURE IPEEP: 10 CMH20
PH BLDA: 7.4 [PH] (ref 7.4–7.5)
PH TEMP ADJ BLDA: 7.38 [PH] (ref 7.4–7.5)
PHOSPHATE SERPL-MCNC: 7.9 MG/DL (ref 2.5–4.5)
PLATELET # BLD AUTO: 33 K/UL (ref 164–446)
PLATELET # BLD AUTO: 40 K/UL (ref 164–446)
PLATELET # BLD AUTO: 41 K/UL (ref 164–446)
PLATELET # BLD AUTO: 50 K/UL (ref 164–446)
PLATELET BLD QL SMEAR: NORMAL
PLATELETS.RETICULATED NFR BLD AUTO: 17.5 K/UL (ref 0.6–13.1)
PO2 BLDA: 195 MMHG (ref 64–87)
PO2 TEMP ADJ BLDA: 204 MMHG (ref 64–87)
POIKILOCYTOSIS BLD QL SMEAR: NORMAL
POTASSIUM SERPL-SCNC: 5.2 MMOL/L (ref 3.6–5.5)
POTASSIUM SERPL-SCNC: 5.4 MMOL/L (ref 3.6–5.5)
POTASSIUM SERPL-SCNC: 5.5 MMOL/L (ref 3.6–5.5)
PROT SERPL-MCNC: 4 G/DL (ref 6–8.2)
PROTHROMBIN TIME: 20.9 SEC (ref 12–14.6)
RBC # BLD AUTO: 3.94 M/UL (ref 4.2–5.4)
RBC # BLD AUTO: 4.13 M/UL (ref 4.2–5.4)
RBC # BLD AUTO: 4.2 M/UL (ref 4.2–5.4)
RBC # BLD AUTO: 4.52 M/UL (ref 4.2–5.4)
RBC BLD AUTO: PRESENT
SAO2 % BLDA: 100 % (ref 93–99)
SCHISTOCYTES BLD QL SMEAR: NORMAL
SODIUM SERPL-SCNC: 144 MMOL/L (ref 135–145)
SODIUM SERPL-SCNC: 145 MMOL/L (ref 135–145)
SODIUM SERPL-SCNC: 145 MMOL/L (ref 135–145)
SPECIMEN DRAWN FROM PATIENT: ABNORMAL
TARGETS BLD QL SMEAR: NORMAL
TEG ALGORITHM TGALG: ABNORMAL
TIDAL VOLUME IVT: 330 ML
VANCOMYCIN SERPL-MCNC: 23.3 UG/ML
WBC # BLD AUTO: 13.2 K/UL (ref 4.8–10.8)
WBC # BLD AUTO: 14.6 K/UL (ref 4.8–10.8)
WBC # BLD AUTO: 14.7 K/UL (ref 4.8–10.8)
WBC # BLD AUTO: 14.9 K/UL (ref 4.8–10.8)

## 2021-12-03 PROCEDURE — 83735 ASSAY OF MAGNESIUM: CPT

## 2021-12-03 PROCEDURE — 85027 COMPLETE CBC AUTOMATED: CPT | Mod: 91

## 2021-12-03 PROCEDURE — 85007 BL SMEAR W/DIFF WBC COUNT: CPT

## 2021-12-03 PROCEDURE — 700101 HCHG RX REV CODE 250: Performed by: INTERNAL MEDICINE

## 2021-12-03 PROCEDURE — 82803 BLOOD GASES ANY COMBINATION: CPT

## 2021-12-03 PROCEDURE — 82962 GLUCOSE BLOOD TEST: CPT

## 2021-12-03 PROCEDURE — 80053 COMPREHEN METABOLIC PANEL: CPT

## 2021-12-03 PROCEDURE — 700111 HCHG RX REV CODE 636 W/ 250 OVERRIDE (IP): Performed by: STUDENT IN AN ORGANIZED HEALTH CARE EDUCATION/TRAINING PROGRAM

## 2021-12-03 PROCEDURE — 71045 X-RAY EXAM CHEST 1 VIEW: CPT

## 2021-12-03 PROCEDURE — 82330 ASSAY OF CALCIUM: CPT

## 2021-12-03 PROCEDURE — 80202 ASSAY OF VANCOMYCIN: CPT

## 2021-12-03 PROCEDURE — 37799 UNLISTED PX VASCULAR SURGERY: CPT

## 2021-12-03 PROCEDURE — 94003 VENT MGMT INPAT SUBQ DAY: CPT

## 2021-12-03 PROCEDURE — 700102 HCHG RX REV CODE 250 W/ 637 OVERRIDE(OP): Performed by: INTERNAL MEDICINE

## 2021-12-03 PROCEDURE — 99292 CRITICAL CARE ADDL 30 MIN: CPT | Performed by: INTERNAL MEDICINE

## 2021-12-03 PROCEDURE — 85576 BLOOD PLATELET AGGREGATION: CPT

## 2021-12-03 PROCEDURE — 770022 HCHG ROOM/CARE - ICU (200)

## 2021-12-03 PROCEDURE — 700111 HCHG RX REV CODE 636 W/ 250 OVERRIDE (IP): Performed by: INTERNAL MEDICINE

## 2021-12-03 PROCEDURE — A9270 NON-COVERED ITEM OR SERVICE: HCPCS | Performed by: STUDENT IN AN ORGANIZED HEALTH CARE EDUCATION/TRAINING PROGRAM

## 2021-12-03 PROCEDURE — 85055 RETICULATED PLATELET ASSAY: CPT

## 2021-12-03 PROCEDURE — 94644 CONT INHLJ TX 1ST HOUR: CPT

## 2021-12-03 PROCEDURE — 700105 HCHG RX REV CODE 258: Performed by: STUDENT IN AN ORGANIZED HEALTH CARE EDUCATION/TRAINING PROGRAM

## 2021-12-03 PROCEDURE — 700105 HCHG RX REV CODE 258: Performed by: INTERNAL MEDICINE

## 2021-12-03 PROCEDURE — 84100 ASSAY OF PHOSPHORUS: CPT

## 2021-12-03 PROCEDURE — 80048 BASIC METABOLIC PNL TOTAL CA: CPT

## 2021-12-03 PROCEDURE — 85384 FIBRINOGEN ACTIVITY: CPT

## 2021-12-03 PROCEDURE — 94799 UNLISTED PULMONARY SVC/PX: CPT

## 2021-12-03 PROCEDURE — 700111 HCHG RX REV CODE 636 W/ 250 OVERRIDE (IP): Performed by: HOSPITALIST

## 2021-12-03 PROCEDURE — 700102 HCHG RX REV CODE 250 W/ 637 OVERRIDE(OP): Performed by: STUDENT IN AN ORGANIZED HEALTH CARE EDUCATION/TRAINING PROGRAM

## 2021-12-03 PROCEDURE — 85347 COAGULATION TIME ACTIVATED: CPT

## 2021-12-03 PROCEDURE — C9113 INJ PANTOPRAZOLE SODIUM, VIA: HCPCS | Performed by: STUDENT IN AN ORGANIZED HEALTH CARE EDUCATION/TRAINING PROGRAM

## 2021-12-03 PROCEDURE — 85610 PROTHROMBIN TIME: CPT

## 2021-12-03 PROCEDURE — 99232 SBSQ HOSP IP/OBS MODERATE 35: CPT | Performed by: SURGERY

## 2021-12-03 PROCEDURE — 99291 CRITICAL CARE FIRST HOUR: CPT | Performed by: INTERNAL MEDICINE

## 2021-12-03 PROCEDURE — 83690 ASSAY OF LIPASE: CPT

## 2021-12-03 RX ORDER — CALCIUM GLUCONATE 20 MG/ML
2 INJECTION, SOLUTION INTRAVENOUS ONCE
Status: COMPLETED | OUTPATIENT
Start: 2021-12-03 | End: 2021-12-03

## 2021-12-03 RX ORDER — SODIUM BICARBONATE IN D5W 150/1000ML
PLASTIC BAG, INJECTION (ML) INTRAVENOUS CONTINUOUS
Status: DISCONTINUED | OUTPATIENT
Start: 2021-12-03 | End: 2021-12-05

## 2021-12-03 RX ORDER — ACETAMINOPHEN 650 MG/1
650 SUPPOSITORY RECTAL EVERY 4 HOURS PRN
Status: DISCONTINUED | OUTPATIENT
Start: 2021-12-03 | End: 2021-12-11

## 2021-12-03 RX ORDER — MAGNESIUM SULFATE HEPTAHYDRATE 40 MG/ML
4 INJECTION, SOLUTION INTRAVENOUS ONCE
Status: COMPLETED | OUTPATIENT
Start: 2021-12-03 | End: 2021-12-03

## 2021-12-03 RX ORDER — SODIUM CHLORIDE, SODIUM LACTATE, POTASSIUM CHLORIDE, AND CALCIUM CHLORIDE .6; .31; .03; .02 G/100ML; G/100ML; G/100ML; G/100ML
500 INJECTION, SOLUTION INTRAVENOUS ONCE
Status: COMPLETED | OUTPATIENT
Start: 2021-12-03 | End: 2021-12-03

## 2021-12-03 RX ADMIN — HYDROCORTISONE SODIUM SUCCINATE 100 MG: 100 INJECTION, POWDER, FOR SOLUTION INTRAMUSCULAR; INTRAVENOUS at 14:00

## 2021-12-03 RX ADMIN — PANTOPRAZOLE SODIUM 40 MG: 40 INJECTION, POWDER, LYOPHILIZED, FOR SOLUTION INTRAVENOUS at 05:30

## 2021-12-03 RX ADMIN — CEFTRIAXONE SODIUM 2 G: 2 INJECTION, POWDER, FOR SOLUTION INTRAMUSCULAR; INTRAVENOUS at 17:32

## 2021-12-03 RX ADMIN — Medication 10 MG: at 13:30

## 2021-12-03 RX ADMIN — HYDROMORPHONE HYDROCHLORIDE 1 MG: 1 INJECTION, SOLUTION INTRAMUSCULAR; INTRAVENOUS; SUBCUTANEOUS at 14:07

## 2021-12-03 RX ADMIN — Medication 1 MG/HR: at 20:00

## 2021-12-03 RX ADMIN — WATER 0.05 MCG/KG/MIN: 1 SOLUTION INTRAVENOUS at 02:58

## 2021-12-03 RX ADMIN — ACETAMINOPHEN 650 MG: 650 SUPPOSITORY RECTAL at 02:50

## 2021-12-03 RX ADMIN — INSULIN HUMAN 2 UNITS: 100 INJECTION, SOLUTION PARENTERAL at 17:32

## 2021-12-03 RX ADMIN — Medication 1.5 MG/HR: at 01:19

## 2021-12-03 RX ADMIN — SODIUM BICARBONATE: 84 INJECTION, SOLUTION INTRAVENOUS at 18:20

## 2021-12-03 RX ADMIN — LEVOTHYROXINE SODIUM ANHYDROUS 38 MCG: 100 INJECTION, POWDER, LYOPHILIZED, FOR SOLUTION INTRAVENOUS at 08:04

## 2021-12-03 RX ADMIN — VASOPRESSIN 0.03 UNITS/MIN: 20 INJECTION INTRAVENOUS at 04:50

## 2021-12-03 RX ADMIN — NOREPINEPHRINE BITARTRATE 6 MCG/MIN: 1 INJECTION, SOLUTION, CONCENTRATE INTRAVENOUS at 19:59

## 2021-12-03 RX ADMIN — PANTOPRAZOLE SODIUM 40 MG: 40 INJECTION, POWDER, LYOPHILIZED, FOR SOLUTION INTRAVENOUS at 17:32

## 2021-12-03 RX ADMIN — MAGNESIUM SULFATE HEPTAHYDRATE 4 G: 40 INJECTION, SOLUTION INTRAVENOUS at 08:04

## 2021-12-03 RX ADMIN — WATER 0.03 MCG/KG/MIN: 1 SOLUTION INTRAVENOUS at 08:34

## 2021-12-03 RX ADMIN — HYDROCORTISONE SODIUM SUCCINATE 100 MG: 100 INJECTION, POWDER, FOR SOLUTION INTRAMUSCULAR; INTRAVENOUS at 05:30

## 2021-12-03 RX ADMIN — SODIUM CHLORIDE, POTASSIUM CHLORIDE, SODIUM LACTATE AND CALCIUM CHLORIDE 500 ML: 600; 310; 30; 20 INJECTION, SOLUTION INTRAVENOUS at 03:32

## 2021-12-03 RX ADMIN — HYDROMORPHONE HYDROCHLORIDE 1 MG: 1 INJECTION, SOLUTION INTRAMUSCULAR; INTRAVENOUS; SUBCUTANEOUS at 08:26

## 2021-12-03 RX ADMIN — SODIUM BICARBONATE: 84 INJECTION, SOLUTION INTRAVENOUS at 08:04

## 2021-12-03 RX ADMIN — HYDROCORTISONE SODIUM SUCCINATE 100 MG: 100 INJECTION, POWDER, FOR SOLUTION INTRAMUSCULAR; INTRAVENOUS at 22:07

## 2021-12-03 RX ADMIN — CALCIUM GLUCONATE 2 G: 20 INJECTION, SOLUTION INTRAVENOUS at 08:04

## 2021-12-03 ASSESSMENT — PAIN DESCRIPTION - PAIN TYPE
TYPE: ACUTE PAIN
TYPE: ACUTE PAIN

## 2021-12-03 ASSESSMENT — FIBROSIS 4 INDEX: FIB4 SCORE: 12.41

## 2021-12-03 NOTE — DISCHARGE PLANNING
Care Transition Team Discharge Planning      Anticipated Discharge Disposition: TBD     Action: LSw attended AM rounds,      Barriers to Discharge: not medically clear/stable     Plan: Lsw will continue to follow, and assist w/ d/c planning.

## 2021-12-03 NOTE — CONSULTS
Reason for PC Consult: Advance Care Planning    Consulted by: Dr. Maya    Assessment:  General: Pt is a 57 yo women that came into the ER on 12/1 c/o of abdominal pain and epigastric pain. PMH of MI, CHF, CAD, TBI with GERD MRSA if sinuses, recent GI bleed, Pancreatitis, Addisons disease Hypothyroidism. Pt had gastric bypass 15 + yrs ago per . Heavy drinker. ED pt was tachypenic and c/o abdominal pain, Lactic acid 2.6 UA with leukocyte and many bacteria CT showed PE located in multiple segmental and subsegmental branches. CT abdomen showed apparent inflammation consistent with pancreatitis and moderate hemoperitoneum and duodenitis/peptic ulcer  and US of the left upper extremities showed DVT around the PICC line. Hgb dropped from 12 to 8 underwent a cardiac arrest with 6 round of CPR, PEA, Asystole, VT s/p shock. Pt was given 6 units of PRBC's, 2 FFP and 1 Platelet placed on NE drip and intubated in the ER.     Social: Pt lives with  Colin and has pets at home, She has three children that live in [place]. Son Norman at bedside and daughter Bhavya aware of the pt's critical situation.     Consults: General surgery    Dyspnea: Yes-    Last BM: 12/03/21-    Pain: No-    Depression: No-    Dementia: No;       Spiritual:  Is Episcopalian or spirituality important for coping with this illness? No-    Has a  or spiritual provider visit been requested? No    Palliative Performance Scale: 10%    Advance Directive: None-    DPOA: No (NOK is her  Colin)-    POLST: No-      Code Status: Full-      Outcome:  Introduced self and role of Palliative care. Assessed the family's understanding of pt's current medical status, overall irma picture, and options for future care. Flori met with RN in conference room, Pt is vented, restrained and opening her eyes but not able to take part in the conversation. Flori reiterated the current events, PC RN helped provide some information  "about the current findings on her CT of her abdomen, labs and vital signes as well as helped educate about the the ventilator and the need for pressors along with sedation. Norman and Colin expressed that Donna was not doing well especially in the last 6 months. She was primarily chair bound with needing help to get short distances, SOB and would be sleeping majority of the day in her recliner, she did not require oxygen at home. If they had to go to appointment she was in the wheelchair.  Pt would self medicate with hard liquor per her . Per her son he was surprise last year when she came to visit how much she was drinking. Pt had a gastric bypass many years ago and she was doing ok for a short,  then progressly started to gain her weight back. She as significant amount of wt in the last year and \"balloned up\" per her son. Colin stated that she has dizzy spells frequently and frequent falls especially in the last year. Colin stated that she was being treated for MRSA in her sinuses and this was the third go around with treatment with antibiotics.     Explored pt's values, beliefs and preferences with her family to understand her GOC. Per Colin they never competed an AD but had multiple conversations about her wishes and he stated that she would not want to be kept going on the ventilator if she was not improving for a long period of time. Norman and Colin had many questions about her ability to improve especially after her cardiac arrest. Stated that first thing will be to continue to try and wean her off the ventilator and if not successful then GOC will be directed to a conversation about comfort care and compassionate extubation. PC RN stated that even if she is weaned off the Vent,  she still may have significant struggles to participate in her own care and if that were the case we could address hospice care in the home. Norman stated, \"she would rather be at home with her pets, and family\" PC RN educated Norman and " Colin about hospice care in the home setting and the different disciplines that are involved with the pt's care. Family is very realistic with the issues that presented prior to this admit and understand her critical state and guarded prognosis. Per Colin and Norman, the entire family is realistic and they support each other in making the right decisions for Donna. Family appreciated the time the PC RN took to review clinical information. Asked for updates from the MD and stated that I would let family know their request. Declined spiritual care at this time.     Active listening, reflection, reminiscing, validation and normalization and empathic support provided throughout the encounter. All questions answered and PC contact information provided.     Recommendations: I do not recommend an ethics or hospice consult at this time because family would like to continue with the above care at this time. .    Updated: MD and RN    Plan: Discussion of code status to take place,  Continue to support pt and family with GOC conversation.   Thank you for allowing Palliative Care to participate in this patient's care. Please feel free to call [phone] with any questions or concerns.

## 2021-12-03 NOTE — PROGRESS NOTES
Pt picked up from ER by 2 ICU RN's and RRT, placed on cardiac monitor with defibrillator, and then brought to CT for scan. Afterward brought to CICU bed 612. Pt transferred to ICU bed and placed on ICU monitoring. MD Rush notified of pt's arrival. See flowsheets for further details.

## 2021-12-03 NOTE — PROGRESS NOTES
Critical Care Progress Note    Date of admission  12/1/2021    Chief Complaint  56 y.o. female admitted 12/1/2021 with acute pancreatitis, pulmonary embolism.  She has a history of prior gastrointestinal bleeding from unknown source, MRSA sinusitis on outpatient antibiotics, Gaines's disease, GERD, primary hypertension, hemochromatosis, hypothyroidism, traumatic brain injury, myocardial infarction and fibromyalgia.    Hospital Course      12/2 -    vent day 1.  Trend hemoglobin and platelet count and transfuse blood products as required.  Full vent support.  Inhaled Flolan for RV dysfunction following cardiac arrest.  12/3 -    vent day 2.  Start bicarbonate drip for MARY LOU.  Titrating norepinephrine.  Wean off Flolan.      Interval Problem Update  Reviewed last 24 hour events:      SR-ST  Vaso  NE titrating  Low UOP  101.7  +295 mL in the last 24  +1795 mL since admit  Start bicarbonate drip  Replete calcium and magnesium  Wean off Flolan      Review of Systems  Review of Systems   Unable to perform ROS: Acuity of condition        Vital Signs for last 24 hours   Temp:  [36.1 °C (97 °F)-38.5 °C (101.3 °F)] 38.5 °C (101.3 °F)  Pulse:  [] 77  Resp:  [11-35] 32  BP: ()/(60-80) 112/80  SpO2:  [77 %-100 %] 95 %    Hemodynamic parameters for last 24 hours       Respiratory Information for the last 24 hours  Vent Mode: APVCMV  Rate (breaths/min): 32  Vt Target (mL): 330  PEEP/CPAP: 10  MAP: 15  Control VTE (exp VT): 400    Physical Exam   Physical Exam  Constitutional:       Appearance: She is not diaphoretic.      Comments: On ventilator   HENT:      Head: Normocephalic.      Nose: Nose normal.      Mouth/Throat:      Mouth: Mucous membranes are moist.      Pharynx: Oropharynx is clear.   Eyes:      Conjunctiva/sclera: Conjunctivae normal.      Pupils: Pupils are equal, round, and reactive to light.   Cardiovascular:      Pulses: Normal pulses.      Comments: Sinus rhythm  Pulmonary:      Breath sounds: Rales  (Few crackles) present. No wheezing.   Abdominal:      General: There is distension.      Tenderness: There is abdominal tenderness.      Comments: No bowel sounds   Musculoskeletal:      Cervical back: Normal range of motion.      Comments: No clubbing or cyanosis   Skin:     General: Skin is warm.   Neurological:      Comments: Sedated, arouses and nods.         Medications  Current Facility-Administered Medications   Medication Dose Route Frequency Provider Last Rate Last Admin   • acetaminophen (TYLENOL) suppository 650 mg  650 mg Rectal Q4HRS PRN Martha Mirza M.D.   650 mg at 12/03/21 0250   • sodium bicarbonate 150 mEq in D5W infusion (premix)   Intravenous Continuous Eligio Del Castillo M.D.       • calcium GLUConate 2 g in NaCl IVPB premix  2 g Intravenous Once Eligio Del Castillo M.D.       • magnesium sulfate IVPB premix 4 g  4 g Intravenous Once Eligio Del Castillo M.D.       • levothyroxine (SYNTHROID) injection 38 mcg  38 mcg Intravenous DAILY Eligio Del Castillo M.D.       • norepinephrine (Levophed) 8 mg in 250 mL NS infusion (premix)  0-50 mcg/min Intravenous Continuous Eligio Del Castillo M.D. 3.8 mL/hr at 12/03/21 0417 2 mcg/min at 12/03/21 0417   • hydrocortisone sodium succinate PF (Solu-CORTEF) 100 MG injection 100 mg  100 mg Intravenous Q8HRS Rudolph Foy D.O.   100 mg at 12/03/21 0530   • pantoprazole (PROTONIX) injection 40 mg  40 mg Intravenous BID Fausto Jay M.D.   40 mg at 12/03/21 0530   • Respiratory Therapy Consult   Nebulization Continuous RT Servando Rush M.D.       • MD Alert...ICU Electrolyte Replacement per Pharmacy   Other PHARMACY TO DOSE Servando Rush M.D.       • lidocaine (XYLOCAINE) 1 % injection 2 mL  2 mL Tracheal Tube Q30 MIN PRN Servando Rush M.D.       • epoprostenol (FLOLAN) 1.5 mg in glycine diluent for flolan 50 mL for Inhalation  0.01-0.05 mcg/kg/min Inhalation Continuous Servando Rush M.D. 9.87 mL/hr at 12/03/21  0258 0.05 mcg/kg/min at 12/03/21 0258   • dexmedetomidine (PRECEDEX) 400 mcg/100mL NS premix infusion  0.1-1.5 mcg/kg/hr Intravenous Continuous Eligio Del Castillo M.D. 4.9 mL/hr at 12/03/21 0628 0.2 mcg/kg/hr at 12/03/21 0628   • HYDROmorphone (DILAUDID) 0.2 mg/mL in 50mL NS (Continuous Infusion)   Intravenous Continuous Eligio Del Castillo M.D. 2.5 mL/hr at 12/03/21 0715 0.5 mg/hr at 12/03/21 0715   • HYDROmorphone (Dilaudid) injection 0.5-2 mg  0.5-2 mg Intravenous Q HOUR PRN Eligio Del Castillo M.D.   1 mg at 12/02/21 1945   • vasopressin (VASOSTRICT) 20 Units in  mL Infusion  0.03 Units/min Intravenous Continuous Eligio Del Castillo M.D. 9 mL/hr at 12/03/21 0450 0.03 Units/min at 12/03/21 0450   • senna-docusate (PERICOLACE or SENOKOT S) 8.6-50 MG per tablet 2 Tablet  2 Tablet Oral BID David Yoder M.D.        And   • polyethylene glycol/lytes (MIRALAX) PACKET 1 Packet  1 Packet Oral QDAY PRN David Yoder M.D.        And   • magnesium hydroxide (MILK OF MAGNESIA) suspension 30 mL  30 mL Oral QDAY PRN David Yoder M.D.        And   • bisacodyl (DULCOLAX) suppository 10 mg  10 mg Rectal QDAY PRN David Yoder M.D.       • Pharmacy Consult Request ...Pain Management Review 1 Each  1 Each Other PHARMACY TO DOSE David Yoder M.D.       • ondansetron (ZOFRAN) syringe/vial injection 4 mg  4 mg Intravenous Q4HRS PRN David Yoder M.D.       • ondansetron (ZOFRAN ODT) dispertab 4 mg  4 mg Oral Q4HRS PRN David Yoder M.D.       • promethazine (PHENERGAN) suppository 12.5-25 mg  12.5-25 mg Rectal Q4HRS PRN David Yoder M.D.       • insulin regular (HumuLIN R,NovoLIN R) injection  1-6 Units Subcutaneous Q6HRS David Yoder M.D.        And   • dextrose 50% (D50W) injection 50 mL  50 mL Intravenous Q15 MIN PRN David Yoder M.D.   50 mL at 12/02/21 1842       Fluids    Intake/Output Summary (Last 24 hours) at 12/3/2021 0727  Last data filed at 12/3/2021 0600  Gross per 24 hour    Intake 904.97 ml   Output 610 ml   Net 294.97 ml       Laboratory  Recent Labs     12/02/21  1311 12/02/21  1609 12/03/21  0232   ISTATAPH 7.141* 7.294* 7.400   ISTATAPCO2 42.8* 35.7 27.8   ISTATAPO2 170* 294* 195*   ISTATATCO2 16* 18* 18*   [id number] 99 100* 100*   ISTATARTHCO3 14.6* 17.3 17.2   ISTATARTBE -14* -8* -6*   ISTATTEMP 35.7 C 36.1 C 38.6 C   ISTATFIO2 80 100 90   ISTATSPEC Arterial Arterial Arterial   ISTATAPHTC 7.157* 7.306* 7.377*   [id number] 163* 290* 204*         Recent Labs     12/02/21  1840 12/03/21  0112 12/03/21  0515   SODIUM 145 145 145   POTASSIUM 4.8 5.2 5.5   CHLORIDE 109 111 112   CO2 15* 15* 15*   BUN 32* 37* 40*   CREATININE 2.01* 2.49* 2.37*   MAGNESIUM  --   --  1.5   PHOSPHORUS  --   --  7.9*   CALCIUM 8.5 7.9* 7.5*     Recent Labs     12/01/21  1345 12/01/21  1623 12/02/21  0617 12/02/21  0617 12/02/21  0936 12/02/21  1144 12/02/21  1840 12/03/21  0112 12/03/21  0515   ALTSGPT   < >  --  81*  --  39  --   --   --  120*   ASTSGOT   < >  --  85*  --  68*  --   --   --  345*   ALKPHOSPHAT   < >  --  144*  --  75  --   --   --  112*   TBILIRUBIN   < >  --  0.7  --  0.3  --   --   --  1.5   LIPASE  --  1275*  --   --   --   --   --   --  548*   GLUCOSE   < >  --  78   < > 127*   < > 52* 83 88    < > = values in this interval not displayed.     Recent Labs     12/01/21  1345 12/01/21  1345 12/02/21  0617 12/02/21  0806 12/02/21  0936 12/02/21  1144 12/02/21  1840 12/03/21  0112 12/03/21  0515   WBC 17.4*  --   --    < > 13.9*   < > 8.7 14.7* 14.6*   NEUTSPOLYS 85.40*  --   --   --  43.50*  --   --   --  89.50*   LYMPHOCYTES 7.60*  --   --   --  17.40*  --   --   --  0.00*   MONOCYTES 5.90  --   --   --  5.20  --   --   --  0.90   EOSINOPHILS 0.20  --   --   --  0.00  --   --   --  0.00   BASOPHILS 0.20  --   --   --  0.90  --   --   --  0.00   ASTSGOT 72*   < > 85*  --  68*  --   --   --  345*   ALTSGPT 117*   < > 81*  --  39  --   --   --  120*   ALKPHOSPHAT 226*   < > 144*  --  75   --   --   --  112*   TBILIRUBIN 0.6   < > 0.7  --  0.3  --   --   --  1.5    < > = values in this interval not displayed.     Recent Labs     12/01/21  1345 12/01/21  1345 12/01/21  1832 12/01/21  1832 12/01/21  2320 12/02/21  0806 12/02/21  0936 12/02/21  1144 12/02/21  1840 12/03/21  0112 12/03/21  0515   RBC 6.13*  --   --   --   --    < > 3.84*   < > 4.66 4.52 4.20   HEMOGLOBIN 12.6  --   --   --   --    < > 10.5*   < > 12.8 12.5 11.6*   HEMATOCRIT 45.2  --   --   --   --    < > 35.6*   < > 40.0 35.9* 33.3*   PLATELETCT 242  --   --   --   --    < > 57*   < > 72* 50* 41*   PROTHROMBTM 15.5*   < > 16.3*   < > 17.4*  --  26.2*  --   --   --  20.9*   APTT 28.8  --  31.0  --  137.5*  --   --   --   --   --   --    INR 1.26*   < > 1.35*   < > 1.47*  --  2.50*  --   --   --  1.86*    < > = values in this interval not displayed.       Imaging  X-Ray:  I have personally reviewed the images and compared with prior images. and My impression is: Improved edema    Assessment/Plan  * Cardiac arrest (HCC)  Assessment & Plan  PEA, asystole and ventricular tachycardia in ED  ROSC after 7 rounds of CPR, IV epinephrine, IV bicarbonate solution and massive transfusion protocol  Echo with RV dysfunction and RVSP of 50 mmHg - wean off inhaled epoprostenol    Gastrointestinal hemorrhage  Assessment & Plan  History of gastrointestinal hemorrhage with no source identified  Suspect gastrointestinal hemorrhage provoked by IV heparin for pulmonary embolism  IV pantoprazole twice daily  Trend hemoglobin and transfuse PRBCs to keep hemoglobin greater than 7    Shock (HCC)- (present on admission)  Assessment & Plan  Hemorrhagic shock with acute gastrointestinal blood loss and vasodistributive shock  Continue IV fluid resuscitation  I am titrating norepinephrine to keep mean arterial pressure greater than 65    Acute respiratory failure with hypoxia (HCC)- (present on admission)  Assessment & Plan  Intubated 12/2  All of the appropriate  ventilator bundles are in place  SAT/SBT as appropriate  Mobility level 2    Hemoperitoneum  Assessment & Plan  Evaluated by Dr. David Steele  No indication for surgery at this time  Trend hemoglobin and transfuse PRBCs to keep hemoglobin greater than 7    DVT (deep venous thrombosis) (HCC)  Assessment & Plan  Imaging reveals LUE DVT associated with her PICC line and a distal LLE DVT  Anticoagulation strictly contraindicated due to acute gastrointestinal hemorrhage and hemoperitoneum  She will require an IVC filter when improved    Acute pancreatitis  Assessment & Plan  Bowel rest  Trend lipase - improved  OG to suction    PE (pulmonary thromboembolism) (HCC)- (present on admission)  Assessment & Plan  Anticoagulation is contraindicated  She will require IVC filter placement    Acute blood loss anemia- (present on admission)  Assessment & Plan  Suspect gastrointestinal source of blood loss as well as acute hemoperitoneum  Trend hemoglobin and transfuse PRBCs to keep hemoglobin greater than 7    Thrombocytopenia (HCC)- (present on admission)  Assessment & Plan  Trend platelet count and thromboelastogram with platelet mapping  Transfuse platelets as required    Adrenal insufficiency ([place]ue[place]'s disease) (HCC)- (present on admission)  Assessment & Plan  Continue high-dose hydrocortisone, 100 mg IV every 8 hours    Elevated LFTs- (present on admission)  Assessment & Plan  Due to ischemic hepatopathy from cardiac arrest and shock  Trend enzymes and synthetic function  Avoid hepatotoxins    Acute kidney injury (HCC)- (present on admission)  Assessment & Plan  Suspect ATN due to shock in lady with underlying atrophic kidneys  Monitor renal function and urine output  Avoid nephrotoxins and renal dose medications  Begin bicarbonate drip    Primary hypertension- (present on admission)  Assessment & Plan  Hold antihypertensives    Hypocalcemia  Assessment & Plan  Replete calcium    Hypomagnesemia  Assessment & Plan  Replete  magnesium    Hemochromatosis- (present on admission)  Assessment & Plan  History of    MRSA sinusitis  Assessment & Plan  On outpatient IV antibiotics - LUE PICC in place  Continue vancomycin    Hypothyroidism- (present on admission)  Assessment & Plan  Continue levothyroxine    Obesity (BMI 35.0-39.9 without comorbidity)- (present on admission)  Assessment & Plan          VTE:  Contraindicated  Ulcer: PPI  Lines: Central Line  Ongoing indication addressed, Arterial Line  Ongoing indication addressed and Perez Catheter  Ongoing indication addressed    I have performed a physical exam and reviewed and updated ROS and Plan today (12/3/2021). In review of yesterday's note (12/2/2021), there are no changes except as documented above.     I have assessed and reassessed her respiratory status with ventilator adjustments, ventilator waveforms, airway mechanics, hemodynamics, blood pressure, cardiovascular status, urine output, serial determinations of acid-base status, serial determinations of hemoglobin and platelet count as well as her neurologic status.  She is at high risk for worsening respiratory, cardiovascular, gastrointestinal, renal and CNS system dysfunction.    Discussed patient condition and risk of morbidity and/or mortality with RN, RT, Pharmacy, Charge nurse / hot rounds and QA team     The patient remains critically ill.  Critical care time = 155 minutes in directly providing and coordinating critical care and extensive data review.  No time overlap and excludes procedures.    Eligio Del Castillo MD  Pulmonary and Critical Care Medicine

## 2021-12-03 NOTE — PROGRESS NOTES
"    ACS BLUE SERVICE  DATE: 12/3/2021    HD 3: Hemoperitoneum    Interval Events:  Weaning off pressors  Follows on vent  Discussed with Dr. Olea    PHYSICAL EXAMINATION:  Constitutional:     Vital Signs: /80   Pulse 77   Temp (!) 38.5 °C (101.3 °F) (Bladder)   Resp (!) 32   Ht 1.626 m (5' 4\")   Wt 99.5 kg (219 lb 5.7 oz)   SpO2 95%   GENERAL:  No acute distress  HEENT: Pupils equal, round and reactive to light bilaterally.  Sclera are clear bilaterally.  No otorrhea.  No rhinorrhea.  Mucus membranes are moist.  CHEST:  Lungs are clear to auscultation bilaterally  CARDIOVASCULAR:  Regular rate and rhythm  ABDOMEN: Soft, non-tender, non-distended  EXTREMITIES:  Good range of motion through out  SKIN:  Warm and well perfused, no rashes    Laboratory Values:   Recent Labs     12/01/21  1345 12/02/21  0806 12/02/21  1144 12/02/21  1615 12/02/21  1840 12/03/21  0112 12/03/21  0515   WBC 17.4*   < > 11.2*   < > 8.7 14.7* 14.6*   RBC 6.13*   < > 4.27   < > 4.66 4.52 4.20   HEMOGLOBIN 12.6   < > 11.5*   < > 12.8 12.5 11.6*   HEMATOCRIT 45.2   < > 37.1   < > 40.0 35.9* 33.3*   MCV 73.7*   < > 86.9   < > 85.8 79.4* 79.3*   MCH 20.6*   < > 26.9*   < > 27.5 27.7 27.6   MCHC 27.9*   < > 31.0*   < > 32.0* 34.8 34.8   RDW 59.6*   < > 66.3*   < > 62.9* 57.7* 57.9*   PLATELETCT 242   < > 121*   < > 72* 50* 41*   MPV 10.3  --  10.6  --   --   --   --     < > = values in this interval not displayed.     Recent Labs     12/02/21  1840 12/03/21  0112 12/03/21  0515   SODIUM 145 145 145   POTASSIUM 4.8 5.2 5.5   CHLORIDE 109 111 112   CO2 15* 15* 15*   GLUCOSE 52* 83 88   BUN 32* 37* 40*   CREATININE 2.01* 2.49* 2.37*   CALCIUM 8.5 7.9* 7.5*     Recent Labs     12/01/21  1345 12/01/21  2320 12/02/21  0617 12/02/21  0936 12/03/21  0515   ASTSGOT   < >  --  85* 68* 345*   ALTSGPT   < >  --  81* 39 120*   TBILIRUBIN   < >  --  0.7 0.3 1.5   ALKPHOSPHAT   < >  --  144* 75 112*   GLOBULIN   < >  --  2.1 1.7* 1.7*   INR  " --  1.47*  --  2.50* 1.86*    < > = values in this interval not displayed.     Recent Labs     12/01/21  1345 12/01/21  1345 12/01/21  1832 12/01/21  1832 12/01/21  2320 12/02/21  0936 12/03/21  0515   APTT 28.8  --  31.0  --  137.5*  --   --    INR 1.26*   < > 1.35*   < > 1.47* 2.50* 1.86*    < > = values in this interval not displayed.         ASSESSMENT AND PLAN:   1) Hemoperitoneum:    Likely secondary to blunt liver injury, perhaps from chest compressions received during CPR, but this is not for certain.  We will managed this like solid abdominal organ injury seen in trauma.      -Serial hemoglobin  -Keep coagulation profile normal  -No therapeutic or DVT anticoagulation at this time     ____________________________________     Rodríguez Coker M.D.

## 2021-12-03 NOTE — PROGRESS NOTES
2 RN skin check completed with Marina ROBERTSON   Devices in place: cardiac leads, pulse ox probe, BP cuff, right femoral a-line, left femoral Cordis, Left upper arm PICC, PIV, and SCD sleeves   Skin assessed under devices: Yes  Confirmed pressure ulcers found: None  New potential pressure ulcers noted: None  The following interventions in place: Q2H turns to be done, elbows and heels floated on pillows, preventative mepilex foam placedon sacrum and both heels.

## 2021-12-03 NOTE — ASSESSMENT & PLAN NOTE
Due to ischemic hepatopathy from cardiac arrest and shock  Trend enzymes and synthetic function  Avoid hepatotoxins  Alk phos and transaminases trending down - continue to monitor

## 2021-12-03 NOTE — CARE PLAN
"The patient is Watcher - Medium risk of patient condition declining or worsening    Shift Goals  Clinical Goals: Hemodynamic stability  Patient Goals: N/A    Progress made toward(s) clinical / shift goals:  Titrating pressors, pain medication to maintain hemodynamics. RT adjusting vent settings as needed. Administered bolus for low urine output. Administered rectal tylenol for fever.     Patient is progressing towards the following goals:      Problem: Pain - Standard  Goal: Alleviation of pain or a reduction in pain to the patient’s comfort goal  12/3/2021 0427 by Charlie Murray R.N.  Outcome: Progressing  Note: Adjusting continuous dilaudid drip/ administering PRN dilaudid pushes when pt indicates pain. Pt tolerating well.   12/3/2021 0424 by Charlie Murray R.N.  Outcome: Progressing  Note: Adjusting continuous dilaudid drip/ administering PRN dilaudid pushes when pt indicates pain. Pt tolerating well.      Problem: Knowledge Deficit - Standard  Goal: Patient and family/care givers will demonstrate understanding of plan of care, disease process/condition, diagnostic tests and medications  12/3/2021 0427 by Charlie Murray R.N.  Outcome: Progressing  Note: Pt nods head \"yes\" when educated about POC and ADL's. Pt tolerates well.   12/3/2021 0424 by Charlie Murray R.N.  Outcome: Progressing  Note: Pt nods when educated on POC and ADL's. Pt tolerating well.      Problem: Fall Risk  Goal: Patient will remain free from falls  12/3/2021 0427 by Charlie Murray R.N.  Outcome: Progressing  12/3/2021 0424 by Charlie Murray R.N.  Outcome: Progressing     "

## 2021-12-04 ENCOUNTER — APPOINTMENT (OUTPATIENT)
Dept: RADIOLOGY | Facility: MEDICAL CENTER | Age: 56
DRG: 981 | End: 2021-12-04
Attending: INTERNAL MEDICINE
Payer: MEDICARE

## 2021-12-04 LAB
ALBUMIN SERPL BCP-MCNC: 2.3 G/DL (ref 3.2–4.9)
ALBUMIN/GLOB SERPL: 1 G/DL
ALP SERPL-CCNC: 159 U/L (ref 30–99)
ALT SERPL-CCNC: 101 U/L (ref 2–50)
ANION GAP SERPL CALC-SCNC: 17 MMOL/L (ref 7–16)
ANISOCYTOSIS BLD QL SMEAR: ABNORMAL
AST SERPL-CCNC: 167 U/L (ref 12–45)
BARCODED ABORH UBTYP: 5100
BARCODED PRD CODE UBPRD: NORMAL
BARCODED UNIT NUM UBUNT: NORMAL
BASE EXCESS BLDA CALC-SCNC: 3 MMOL/L (ref -4–3)
BASOPHILS # BLD AUTO: 0 % (ref 0–1.8)
BASOPHILS # BLD: 0 K/UL (ref 0–0.12)
BILIRUB SERPL-MCNC: 1.2 MG/DL (ref 0.1–1.5)
BODY TEMPERATURE: ABNORMAL DEGREES
BREATHS SETTING VENT: 32
BUN SERPL-MCNC: 50 MG/DL (ref 8–22)
BURR CELLS BLD QL SMEAR: NORMAL
CA-I SERPL-SCNC: 0.9 MMOL/L (ref 1.1–1.3)
CALCIUM SERPL-MCNC: 7.1 MG/DL (ref 8.5–10.5)
CHLORIDE SERPL-SCNC: 102 MMOL/L (ref 96–112)
CO2 BLDA-SCNC: 27 MMOL/L (ref 20–33)
CO2 SERPL-SCNC: 22 MMOL/L (ref 20–33)
COMPONENT P 8504P: NORMAL
CREAT SERPL-MCNC: 3.25 MG/DL (ref 0.5–1.4)
DELSYS IDSYS: ABNORMAL
END TIDAL CARBON DIOXIDE IECO2: 27 MMHG
EOSINOPHIL # BLD AUTO: 0 K/UL (ref 0–0.51)
EOSINOPHIL NFR BLD: 0 % (ref 0–6.9)
ERYTHROCYTE [DISTWIDTH] IN BLOOD BY AUTOMATED COUNT: 61.1 FL (ref 35.9–50)
ERYTHROCYTE [DISTWIDTH] IN BLOOD BY AUTOMATED COUNT: 61.3 FL (ref 35.9–50)
ERYTHROCYTE [DISTWIDTH] IN BLOOD BY AUTOMATED COUNT: 62.9 FL (ref 35.9–50)
ERYTHROCYTE [DISTWIDTH] IN BLOOD BY AUTOMATED COUNT: 65.6 FL (ref 35.9–50)
GLOBULIN SER CALC-MCNC: 2.3 G/DL (ref 1.9–3.5)
GLUCOSE BLD-MCNC: 195 MG/DL (ref 65–99)
GLUCOSE BLD-MCNC: 200 MG/DL (ref 65–99)
GLUCOSE BLD-MCNC: 203 MG/DL (ref 65–99)
GLUCOSE BLD-MCNC: 217 MG/DL (ref 65–99)
GLUCOSE SERPL-MCNC: 235 MG/DL (ref 65–99)
HCO3 BLDA-SCNC: 26.1 MMOL/L (ref 17–25)
HCT VFR BLD AUTO: 27.9 % (ref 37–47)
HCT VFR BLD AUTO: 29.8 % (ref 37–47)
HCT VFR BLD AUTO: 32.3 % (ref 37–47)
HCT VFR BLD AUTO: 32.7 % (ref 37–47)
HGB BLD-MCNC: 10.3 G/DL (ref 12–16)
HGB BLD-MCNC: 11.3 G/DL (ref 12–16)
HGB BLD-MCNC: 11.6 G/DL (ref 12–16)
HGB BLD-MCNC: 9.7 G/DL (ref 12–16)
HOROWITZ INDEX BLDA+IHG-RTO: 182 MM[HG]
INR PPP: 1.32 (ref 0.87–1.13)
LIPASE SERPL-CCNC: 219 U/L (ref 11–82)
LYMPHOCYTES # BLD AUTO: 0.57 K/UL (ref 1–4.8)
LYMPHOCYTES NFR BLD: 3.4 % (ref 22–41)
MAGNESIUM SERPL-MCNC: 2.6 MG/DL (ref 1.5–2.5)
MANUAL DIFF BLD: NORMAL
MCH RBC QN AUTO: 26.9 PG (ref 27–33)
MCH RBC QN AUTO: 27.2 PG (ref 27–33)
MCH RBC QN AUTO: 27.8 PG (ref 27–33)
MCH RBC QN AUTO: 28 PG (ref 27–33)
MCHC RBC AUTO-ENTMCNC: 34.6 G/DL (ref 33.6–35)
MCHC RBC AUTO-ENTMCNC: 34.6 G/DL (ref 33.6–35)
MCHC RBC AUTO-ENTMCNC: 34.8 G/DL (ref 33.6–35)
MCHC RBC AUTO-ENTMCNC: 35.9 G/DL (ref 33.6–35)
MCV RBC AUTO: 77.8 FL (ref 81.4–97.8)
MCV RBC AUTO: 77.9 FL (ref 81.4–97.8)
MCV RBC AUTO: 78.8 FL (ref 81.4–97.8)
MCV RBC AUTO: 79.9 FL (ref 81.4–97.8)
METAMYELOCYTES NFR BLD MANUAL: 2.6 %
MODE IMODE: ABNORMAL
MONOCYTES # BLD AUTO: 1.01 K/UL (ref 0–0.85)
MONOCYTES NFR BLD AUTO: 6 % (ref 0–13.4)
MORPHOLOGY BLD-IMP: NORMAL
NEUTROPHILS # BLD AUTO: 14.87 K/UL (ref 2–7.15)
NEUTROPHILS NFR BLD: 80.2 % (ref 44–72)
NEUTS BAND NFR BLD MANUAL: 7.8 % (ref 0–10)
NRBC # BLD AUTO: 0.07 K/UL
NRBC BLD-RTO: 0.4 /100 WBC
O2/TOTAL GAS SETTING VFR VENT: 50 %
OVALOCYTES BLD QL SMEAR: NORMAL
PCO2 BLDA: 32.5 MMHG (ref 26–37)
PCO2 TEMP ADJ BLDA: 32.1 MMHG (ref 26–37)
PEEP END EXPIRATORY PRESSURE IPEEP: 10 CMH20
PH BLDA: 7.51 [PH] (ref 7.4–7.5)
PH TEMP ADJ BLDA: 7.52 [PH] (ref 7.4–7.5)
PHOSPHATE SERPL-MCNC: 7 MG/DL (ref 2.5–4.5)
PLATELET # BLD AUTO: 28 K/UL (ref 164–446)
PLATELET # BLD AUTO: 31 K/UL (ref 164–446)
PLATELET # BLD AUTO: 32 K/UL (ref 164–446)
PLATELET # BLD AUTO: 56 K/UL (ref 164–446)
PLATELET BLD QL SMEAR: NORMAL
PLATELETS.RETICULATED NFR BLD AUTO: 28.9 K/UL (ref 0.6–13.1)
PO2 BLDA: 91 MMHG (ref 64–87)
PO2 TEMP ADJ BLDA: 89 MMHG (ref 64–87)
POIKILOCYTOSIS BLD QL SMEAR: NORMAL
POTASSIUM SERPL-SCNC: 5.2 MMOL/L (ref 3.6–5.5)
PRODUCT TYPE UPROD: NORMAL
PROT SERPL-MCNC: 4.6 G/DL (ref 6–8.2)
PROTHROMBIN TIME: 16 SEC (ref 12–14.6)
RBC # BLD AUTO: 3.49 M/UL (ref 4.2–5.4)
RBC # BLD AUTO: 3.78 M/UL (ref 4.2–5.4)
RBC # BLD AUTO: 4.15 M/UL (ref 4.2–5.4)
RBC # BLD AUTO: 4.2 M/UL (ref 4.2–5.4)
RBC BLD AUTO: PRESENT
SAO2 % BLDA: 98 % (ref 93–99)
SCHISTOCYTES BLD QL SMEAR: NORMAL
SODIUM SERPL-SCNC: 141 MMOL/L (ref 135–145)
SPECIMEN DRAWN FROM PATIENT: ABNORMAL
TARGETS BLD QL SMEAR: NORMAL
TIDAL VOLUME IVT: 330 ML
UNIT STATUS USTAT: NORMAL
WBC # BLD AUTO: 12.3 K/UL (ref 4.8–10.8)
WBC # BLD AUTO: 15.7 K/UL (ref 4.8–10.8)
WBC # BLD AUTO: 16.9 K/UL (ref 4.8–10.8)
WBC # BLD AUTO: 17 K/UL (ref 4.8–10.8)

## 2021-12-04 PROCEDURE — 71045 X-RAY EXAM CHEST 1 VIEW: CPT

## 2021-12-04 PROCEDURE — 700111 HCHG RX REV CODE 636 W/ 250 OVERRIDE (IP): Performed by: HOSPITALIST

## 2021-12-04 PROCEDURE — 99232 SBSQ HOSP IP/OBS MODERATE 35: CPT | Performed by: SURGERY

## 2021-12-04 PROCEDURE — 99291 CRITICAL CARE FIRST HOUR: CPT | Mod: 25 | Performed by: INTERNAL MEDICINE

## 2021-12-04 PROCEDURE — 85007 BL SMEAR W/DIFF WBC COUNT: CPT

## 2021-12-04 PROCEDURE — A9270 NON-COVERED ITEM OR SERVICE: HCPCS | Performed by: INTERNAL MEDICINE

## 2021-12-04 PROCEDURE — 700111 HCHG RX REV CODE 636 W/ 250 OVERRIDE (IP): Performed by: INTERNAL MEDICINE

## 2021-12-04 PROCEDURE — 02HV33Z INSERTION OF INFUSION DEVICE INTO SUPERIOR VENA CAVA, PERCUTANEOUS APPROACH: ICD-10-PCS | Performed by: INTERNAL MEDICINE

## 2021-12-04 PROCEDURE — 36430 TRANSFUSION BLD/BLD COMPNT: CPT

## 2021-12-04 PROCEDURE — 82962 GLUCOSE BLOOD TEST: CPT | Mod: 91

## 2021-12-04 PROCEDURE — 85055 RETICULATED PLATELET ASSAY: CPT

## 2021-12-04 PROCEDURE — 83735 ASSAY OF MAGNESIUM: CPT

## 2021-12-04 PROCEDURE — 700102 HCHG RX REV CODE 250 W/ 637 OVERRIDE(OP): Performed by: INTERNAL MEDICINE

## 2021-12-04 PROCEDURE — 82330 ASSAY OF CALCIUM: CPT

## 2021-12-04 PROCEDURE — 700111 HCHG RX REV CODE 636 W/ 250 OVERRIDE (IP): Performed by: STUDENT IN AN ORGANIZED HEALTH CARE EDUCATION/TRAINING PROGRAM

## 2021-12-04 PROCEDURE — 85027 COMPLETE CBC AUTOMATED: CPT

## 2021-12-04 PROCEDURE — C9113 INJ PANTOPRAZOLE SODIUM, VIA: HCPCS | Performed by: STUDENT IN AN ORGANIZED HEALTH CARE EDUCATION/TRAINING PROGRAM

## 2021-12-04 PROCEDURE — 770022 HCHG ROOM/CARE - ICU (200)

## 2021-12-04 PROCEDURE — 74018 RADEX ABDOMEN 1 VIEW: CPT

## 2021-12-04 PROCEDURE — 84100 ASSAY OF PHOSPHORUS: CPT

## 2021-12-04 PROCEDURE — P9034 PLATELETS, PHERESIS: HCPCS

## 2021-12-04 PROCEDURE — 37799 UNLISTED PX VASCULAR SURGERY: CPT

## 2021-12-04 PROCEDURE — 36556 INSERT NON-TUNNEL CV CATH: CPT | Mod: RT | Performed by: INTERNAL MEDICINE

## 2021-12-04 PROCEDURE — 80053 COMPREHEN METABOLIC PANEL: CPT

## 2021-12-04 PROCEDURE — 36556 INSERT NON-TUNNEL CV CATH: CPT

## 2021-12-04 PROCEDURE — 94003 VENT MGMT INPAT SUBQ DAY: CPT

## 2021-12-04 PROCEDURE — 700105 HCHG RX REV CODE 258: Performed by: INTERNAL MEDICINE

## 2021-12-04 PROCEDURE — 94799 UNLISTED PULMONARY SVC/PX: CPT

## 2021-12-04 PROCEDURE — 700101 HCHG RX REV CODE 250: Performed by: INTERNAL MEDICINE

## 2021-12-04 PROCEDURE — 82803 BLOOD GASES ANY COMBINATION: CPT

## 2021-12-04 PROCEDURE — 83690 ASSAY OF LIPASE: CPT

## 2021-12-04 PROCEDURE — 99292 CRITICAL CARE ADDL 30 MIN: CPT | Mod: 25 | Performed by: INTERNAL MEDICINE

## 2021-12-04 PROCEDURE — 85610 PROTHROMBIN TIME: CPT

## 2021-12-04 PROCEDURE — C1752 CATH,HEMODIALYSIS,SHORT-TERM: HCPCS

## 2021-12-04 RX ORDER — CALCIUM GLUCONATE 20 MG/ML
2 INJECTION, SOLUTION INTRAVENOUS ONCE
Status: COMPLETED | OUTPATIENT
Start: 2021-12-04 | End: 2021-12-04

## 2021-12-04 RX ORDER — MIDAZOLAM HYDROCHLORIDE 1 MG/ML
2 INJECTION INTRAMUSCULAR; INTRAVENOUS
Status: DISCONTINUED | OUTPATIENT
Start: 2021-12-04 | End: 2021-12-05

## 2021-12-04 RX ORDER — HALOPERIDOL 5 MG/ML
5 INJECTION INTRAMUSCULAR ONCE
Status: COMPLETED | OUTPATIENT
Start: 2021-12-04 | End: 2021-12-04

## 2021-12-04 RX ORDER — ONDANSETRON 4 MG/1
4 TABLET, ORALLY DISINTEGRATING ORAL EVERY 4 HOURS PRN
Status: DISCONTINUED | OUTPATIENT
Start: 2021-12-04 | End: 2021-12-14

## 2021-12-04 RX ORDER — LABETALOL HYDROCHLORIDE 5 MG/ML
10-20 INJECTION, SOLUTION INTRAVENOUS EVERY 4 HOURS PRN
Status: DISCONTINUED | OUTPATIENT
Start: 2021-12-04 | End: 2021-12-29 | Stop reason: HOSPADM

## 2021-12-04 RX ORDER — POLYETHYLENE GLYCOL 3350 17 G/17G
1 POWDER, FOR SOLUTION ORAL
Status: DISCONTINUED | OUTPATIENT
Start: 2021-12-04 | End: 2021-12-14

## 2021-12-04 RX ORDER — BISACODYL 10 MG
10 SUPPOSITORY, RECTAL RECTAL
Status: DISCONTINUED | OUTPATIENT
Start: 2021-12-04 | End: 2021-12-14

## 2021-12-04 RX ORDER — AMOXICILLIN 250 MG
2 CAPSULE ORAL 2 TIMES DAILY
Status: DISCONTINUED | OUTPATIENT
Start: 2021-12-04 | End: 2021-12-14

## 2021-12-04 RX ORDER — DEXTROSE MONOHYDRATE 25 G/50ML
50 INJECTION, SOLUTION INTRAVENOUS
Status: DISCONTINUED | OUTPATIENT
Start: 2021-12-04 | End: 2021-12-05

## 2021-12-04 RX ORDER — HYDRALAZINE HYDROCHLORIDE 20 MG/ML
20 INJECTION INTRAMUSCULAR; INTRAVENOUS EVERY 6 HOURS PRN
Status: DISCONTINUED | OUTPATIENT
Start: 2021-12-04 | End: 2021-12-05

## 2021-12-04 RX ADMIN — NOREPINEPHRINE BITARTRATE 2 MCG/MIN: 1 INJECTION, SOLUTION, CONCENTRATE INTRAVENOUS at 13:15

## 2021-12-04 RX ADMIN — INSULIN HUMAN 2 UNITS: 100 INJECTION, SOLUTION PARENTERAL at 00:58

## 2021-12-04 RX ADMIN — INSULIN HUMAN 2 UNITS: 100 INJECTION, SOLUTION PARENTERAL at 06:35

## 2021-12-04 RX ADMIN — HYDROMORPHONE HYDROCHLORIDE 1 MG: 1 INJECTION, SOLUTION INTRAMUSCULAR; INTRAVENOUS; SUBCUTANEOUS at 12:05

## 2021-12-04 RX ADMIN — DOCUSATE SODIUM 50 MG AND SENNOSIDES 8.6 MG 2 TABLET: 8.6; 5 TABLET, FILM COATED ORAL at 17:45

## 2021-12-04 RX ADMIN — CALCIUM GLUCONATE 2 G: 20 INJECTION, SOLUTION INTRAVENOUS at 09:36

## 2021-12-04 RX ADMIN — HYDROCORTISONE SODIUM SUCCINATE 100 MG: 100 INJECTION, POWDER, FOR SOLUTION INTRAMUSCULAR; INTRAVENOUS at 21:39

## 2021-12-04 RX ADMIN — DOCUSATE SODIUM 50 MG AND SENNOSIDES 8.6 MG 2 TABLET: 8.6; 5 TABLET, FILM COATED ORAL at 06:14

## 2021-12-04 RX ADMIN — PANTOPRAZOLE SODIUM 40 MG: 40 INJECTION, POWDER, LYOPHILIZED, FOR SOLUTION INTRAVENOUS at 17:45

## 2021-12-04 RX ADMIN — ALTEPLASE 2 MG: 2.2 INJECTION, POWDER, LYOPHILIZED, FOR SOLUTION INTRAVENOUS at 10:35

## 2021-12-04 RX ADMIN — Medication 1 MG: at 08:29

## 2021-12-04 RX ADMIN — VANCOMYCIN HYDROCHLORIDE 1500 MG: 500 INJECTION, POWDER, LYOPHILIZED, FOR SOLUTION INTRAVENOUS at 02:37

## 2021-12-04 RX ADMIN — HYDROMORPHONE HYDROCHLORIDE 1 MG: 1 INJECTION, SOLUTION INTRAMUSCULAR; INTRAVENOUS; SUBCUTANEOUS at 04:38

## 2021-12-04 RX ADMIN — Medication 2 MG: at 18:13

## 2021-12-04 RX ADMIN — SODIUM BICARBONATE: 84 INJECTION, SOLUTION INTRAVENOUS at 14:14

## 2021-12-04 RX ADMIN — HYDROMORPHONE HYDROCHLORIDE 2 MG: 1 INJECTION, SOLUTION INTRAMUSCULAR; INTRAVENOUS; SUBCUTANEOUS at 22:13

## 2021-12-04 RX ADMIN — DEXMEDETOMIDINE 0.2 MCG/KG/HR: 200 INJECTION, SOLUTION INTRAVENOUS at 10:04

## 2021-12-04 RX ADMIN — SODIUM BICARBONATE: 84 INJECTION, SOLUTION INTRAVENOUS at 02:36

## 2021-12-04 RX ADMIN — CEFTRIAXONE SODIUM 2 G: 2 INJECTION, POWDER, FOR SOLUTION INTRAMUSCULAR; INTRAVENOUS at 06:14

## 2021-12-04 RX ADMIN — MIDAZOLAM HYDROCHLORIDE 2 MG: 1 INJECTION, SOLUTION INTRAMUSCULAR; INTRAVENOUS at 22:41

## 2021-12-04 RX ADMIN — HALOPERIDOL LACTATE 5 MG: 5 INJECTION, SOLUTION INTRAMUSCULAR at 18:13

## 2021-12-04 RX ADMIN — LEVOTHYROXINE SODIUM ANHYDROUS 38 MCG: 100 INJECTION, POWDER, LYOPHILIZED, FOR SOLUTION INTRAVENOUS at 06:14

## 2021-12-04 RX ADMIN — HYDROCORTISONE SODIUM SUCCINATE 100 MG: 100 INJECTION, POWDER, FOR SOLUTION INTRAMUSCULAR; INTRAVENOUS at 06:14

## 2021-12-04 RX ADMIN — PANTOPRAZOLE SODIUM 40 MG: 40 INJECTION, POWDER, LYOPHILIZED, FOR SOLUTION INTRAVENOUS at 06:14

## 2021-12-04 RX ADMIN — HYDROCORTISONE SODIUM SUCCINATE 100 MG: 100 INJECTION, POWDER, FOR SOLUTION INTRAMUSCULAR; INTRAVENOUS at 13:24

## 2021-12-04 RX ADMIN — HYDROMORPHONE HYDROCHLORIDE 2 MG: 1 INJECTION, SOLUTION INTRAMUSCULAR; INTRAVENOUS; SUBCUTANEOUS at 17:45

## 2021-12-04 RX ADMIN — HYDROMORPHONE HYDROCHLORIDE 1 MG: 1 INJECTION, SOLUTION INTRAMUSCULAR; INTRAVENOUS; SUBCUTANEOUS at 11:37

## 2021-12-04 ASSESSMENT — PAIN DESCRIPTION - PAIN TYPE
TYPE: ACUTE PAIN

## 2021-12-04 ASSESSMENT — FIBROSIS 4 INDEX: FIB4 SCORE: 57.91

## 2021-12-04 NOTE — CARE PLAN
Problem: Ventilation  Goal: Ability to achieve and maintain unassisted ventilation or tolerate decreased levels of ventilator support  Description: Document on Vent flowsheet    1.  Support and monitor invasive and noninvasive mechanical ventilation  2.  Monitor ventilator weaning response  3.  Perform ventilator associated pneumonia prevention interventions  4.  Manage ventilation therapy by monitoring diagnostic test results  Outcome: Not Met      Ventilator Daily Summary    Vent Day # 2    Ventilator settings: 32, 330, +10, 60%    Weaning trials: No, failed SAT    Plan: Continue current ventilator settings and wean mechanical ventilation as tolerated per physician orders.

## 2021-12-04 NOTE — PROGRESS NOTES
Nursing Note    Neuro: Alert to Voice, Follows commands, Moves all extremities. Sedated on Dilaudid gtt. RASS -1 Drowsy.    Cardiac: SR, Levo for support MAP >65, Weak Peripheral Pulses      Resp: Intubated, Tolerating ventilator,    GI: NPO, Hypoactive - Absent BS, Belly firm, distended MD Mirza Notfied 0200. I Suggest Bladder Pressure Monitoring.  OG LIS - Geen output.     : Perez, Poor UOP. 0000 Notified MD Minor of Poor UOP <100      LTD: ETT, OG, L PICC, L IJ Triple, R PIV, Perez Catheter,     Skin: Cool, Pale, Slow Cap Refill. LUE DVT LLE DVT       Left Neck        Right Femoral         Scattered Bruising Uppers and Lowers        Sternum    Activity: Bed Rest, Turn Q2    Gtt: Levo, Bicarb, Dilaudid, TKO ABX,    Pain: Complains of generalized pain, abdomen pain,    Shift Summary:    0130 MD Minor Notified for critical value of platelets of 31 down from 33. Poor UOP of <100. 0330 Updated MD Mirza regarding hypoactive -absent bowel sounds and suggested bladder pressure monitoring due to pt abdomen being tight and tender. on most recent bmp results CR 3.25 up from 2.83. Platelets 32. Right Femoral Art Line dressing changed. Left PICC @ [facility] Changed per protocol.

## 2021-12-04 NOTE — PROCEDURES
Date of service:  12/4/2021    Title:  Hemodialysis central venous catheter placement - internal jugular vein    Indication: Acute kidney injury    Narrative:    A time out was performed identifying the correct patient, correct procedure and correct location prior to this procedure.    The right neck was prepped with chlorhexidine and draped in the usual sterile fashion.  1% Xylocaine solution was used for topical anesthesia.  A hemodialysis central venous catheter was placed into the right internal jugular vein under ultrasound guidance using the technique described by Joi without difficulty or apparent complication.  The line was sutured into place and a sterile dressing was placed over the line.  All ports flush and return venous blood easily.  The patient tolerated the procedure quite nicely.  No complications are apparent.  A STAT CXR is ordered to confirm placement.      Eligio Del Castillo MD  Pulmonary and Critical Care Medicine

## 2021-12-04 NOTE — PROGRESS NOTES
"    ACS BLUE SERVICE  DATE: 12/4/2021    HD 4: Hemoperitoneum    Interval Events:  Off pressors  Follows on vent, but more lethargic this morning  Slow downward trend in hemoglobin  ~4L positive, anuric  Critical thrombocytopenia, being transfused  TEG/PM from yesterday looked good  Discussed with Dr. Olea on rounds with the team    PHYSICAL EXAMINATION:  Constitutional:     Vital Signs: /65   Pulse 67   Temp 36.7 °C (98.1 °F)   Resp 14   Ht 1.626 m (5' 4\")   Wt 102 kg (224 lb 3.3 oz)   SpO2 96%   GENERAL:  No acute distress  HEENT: Pupils equal, round and reactive to light bilaterally.  Sclera are clear bilaterally.  No otorrhea.  No rhinorrhea.  Mucus membranes are moist.  CHEST:  Lungs are clear to auscultation bilaterally  CARDIOVASCULAR:  Regular rate and rhythm  ABDOMEN: Soft, non-tender, non-distended  EXTREMITIES:  Good range of motion through out  SKIN:  Warm and well perfused, no rashes    Laboratory Values:   Recent Labs     12/01/21  1345 12/02/21  0806 12/02/21  1144 12/02/21  1615 12/04/21  0042 12/04/21  0225 12/04/21  0625   WBC 17.4*   < > 11.2*   < > 17.0* 16.9* 15.7*   RBC 6.13*   < > 4.27   < > 4.15* 4.20 3.78*   HEMOGLOBIN 12.6   < > 11.5*   < > 11.6* 11.3* 10.3*   HEMATOCRIT 45.2   < > 37.1   < > 32.3* 32.7* 29.8*   MCV 73.7*   < > 86.9   < > 77.8* 77.9* 78.8*   MCH 20.6*   < > 26.9*   < > 28.0 26.9* 27.2   MCHC 27.9*   < > 31.0*   < > 35.9* 34.6 34.6   RDW 59.6*   < > 66.3*   < > 61.1* 61.3* 62.9*   PLATELETCT 242   < > 121*   < > 31* 32* 28*   MPV 10.3  --  10.6  --   --   --   --     < > = values in this interval not displayed.     Recent Labs     12/03/21  0515 12/03/21  1115 12/04/21 0225   SODIUM 145 144 141   POTASSIUM 5.5 5.4 5.2   CHLORIDE 112 112 102   CO2 15* 16* 22   GLUCOSE 88 113* 235*   BUN 40* 42* 50*   CREATININE 2.37* 2.83* 3.25*   CALCIUM 7.5* 7.5* 7.1*     Recent Labs     12/02/21  0936 12/03/21  0515 12/04/21 0225   ASTSGOT 68* 345* 167*   ALTSGPT 39 " 120* 101*   TBILIRUBIN 0.3 1.5 1.2   ALKPHOSPHAT 75 112* 159*   GLOBULIN 1.7* 1.7* 2.3   INR 2.50* 1.86* 1.32*     Recent Labs     12/01/21  1345 12/01/21  1345 12/01/21  1832 12/01/21  1832 12/01/21  2320 12/01/21  2320 12/02/21  0936 12/03/21  0515 12/04/21  0225   APTT 28.8  --  31.0  --  137.5*  --   --   --   --    INR 1.26*   < > 1.35*   < > 1.47*   < > 2.50* 1.86* 1.32*    < > = values in this interval not displayed.         ASSESSMENT AND PLAN:   1) Hemoperitoneum:    Likely secondary to blunt liver injury, perhaps from chest compressions received during CPR, but this is not for certain.  We will managed this like solid abdominal organ injury seen in trauma.      -Serial hemoglobin  -Keep coagulation profile normal  -No therapeutic or DVT anticoagulation at this time  -Would support comfort care measures should the family feel this is in her best interests    Aggregated care time spent evaluating, reassessing, reviewing documentation, providing care, and managing this patient exclusive of procedures: 35 minutes    Norman Coker MD, FACS     ____________________________________     Rodríguez Coker M.D.

## 2021-12-04 NOTE — DIETARY
"Nutrition Support Assessment   Day 3 of admit.  Donna Isaac is a 56 y.o. female with admitting DX oAbdominal Pain; PE (pulmonary thromboembolism) (HCC), Shock (HCC)   Current problem list:  1. Per ED : WC to triage w/ c/o onset of LUQ pain at 0100, woke her from sleep.  Pain then radiated to L chest & LUE w/ some SOB.  Pt sent down from infusion center, was supposed to have antibiotics into her picc, being treated for MRSA in her sinuses.  2.  PE, acute pancreatitis  3. Critical Care transfer  secondary to shock. +Intubation, line insertions. Surgical consult for \"hemoperitoneum after CPR, hemorrhagic shock, anticoagulation.\"      Assessment:  Estimated Nutritional Needs:   Height: 162.6 cm (5' 4\")  Weight: 102 kg (224 lb 3.3 oz)  Weight to Use in Calculations: 98.7 kg (217 lb 9.5 oz) (Lowest scale weight, now +5.2 L fluid per I/O. BMI 37.35.)  Ideal Body Weight: 54.4 kg (120 lb)  Percent Ideal Body Weight: 186.8  Body mass index is 38.49 kg/m²., BMI classification: Class II obesity    Calculation/Equation: REE per MSJ = 1559 kcal/day; RMR per [facility] (vent L/min 8, T max/ 24 hours 37.2) = 1743 kcal/day (65-70% = 2221-7617 kcal/day)  Total Calories / day: 1086 - 1382 (Calories / k - 14)  Total Grams Protein / day: 59 - 79 (Grams Protein / k.6 - 0.8)     Evaluation:   1. Pt is intubated and unable to take PO diet (vent day 3). Consult received for TF. Fibersource HN currently infusing @ 10 ml/hr (not @ 25 ml/hr per protocol per RN due to recent GI issues).  2. Estimated caloric needs are based on ASPEN/SCCM guidelines for obesity. Reduced protein provision in the setting of worsening MARY LOU.  3. Reviewed PMH.  4. Labs: Glu 235, BUN 50, Cr 3.25, GFR 15, phos 7.0, mag 2.6  5. Meds include solu-cortef, insulin, bowel regimen, and IVF @ 75 ml/hr.   6. BM 12/3  7. No wounds noted.  8. Pertinent from Palliative Care consult 12/3: Was not doing well especially in the last 6 months. She was primarily " "chair bound with needing help to get short distances, SOB and would be sleeping majority of the day in her recliner, she did not require oxygen at home. If they had to go to appointment she was in the wheelchair.  Pt would self medicate with hard liquor per her . Per her son he was surprise last year when she came to visit how much she was drinking. Pt had a gastric bypass many years ago and she was doing ok for a short,  then progressly started to gain her weight back. She as significant amount of wt in the last year and \"balloned up\" per her son. Colin stated that she has dizzy spells frequently and frequent falls especially in the last year. Colin stated that she was being treated for MRSA in her sinuses and this was the third go around with treatment with antibiotics.  9. Renal TF formula is indicated.      Malnutrition Risk: None identified @ this time.     Recommendations/Plan:  1. Novasource Renal, goal rate will be 30 ml/hr to provide 1440 kcal, 65 grams protein, and 518 ml free water per day.  2. Fluids per MD.    RD following.   "

## 2021-12-04 NOTE — PROGRESS NOTES
Pharmacy Vancomycin Kinetics Note for 12/3/2021     56 y.o. female on Vancomycin day # 1     Vancomycin Indication (Two level/Trough based Dosing): Skin/skin structure infection (goal trough 10-15)    Provider specified end date: 12/10/21    Active Antibiotics (From admission, onward)    Ordered     Ordering Provider       Fri Dec 3, 2021  4:25 PM    12/03/21 1625  cefTRIAXone (Rocephin) 2 g in  mL IVPB  EVERY 24 HOURS         Eligio Del Castillo M.D.       Fri Dec 3, 2021  9:08 AM    12/03/21 0908  MD Alert...Vancomycin per Pharmacy  PHARMACY TO DOSE        Question:  Indication(s) for vancomycin?  Answer:  Other (comments)    Eligio Del Castillo M.D.          Dosing Weight: 99.5 kg (219 lb 5.7 oz)      Admission History: Admitted on 12/1/2021 for PE (pulmonary thromboembolism) ([facility]) [I26.99]  Shock ([facility]) [R57.9]  Pertinent history: Patient with history of sinusitis followed by [facility] and has been on multiple courses of antibiotics.  Most recent culture with E coli and MRSA.  Vancomycin and ceftriaxone initiated.    Allergies:     Ancef [cefazolin], Bactrim [sulfamethoxazole w-trimethoprim], Bee venom, Buprenorphine, Clindamycin, Contrast media with iodine [iodine], Doxycycline, Econazole, Flagyl  [metronidazole], Floxin [ofloxacin], Gadolinium derivatives, Hydrocodone-acetaminophen, Iodine, Keflex, Levofloxacin, Morphine, Naloxone, Nitrofurantoin, Norco [apap-fd&c yellow #10 [place]-hydrocodone], Nyquil, Oxycodone, Paricalcitol, Penicillins, Tape, Tramadol, Vicks dayquil cold, Azithromycin, Bextra [valdecoxib], Linezolid, Adhesive remover [skin adhesives], Codeine, Sulfa drugs, and Tygacil [tigecycline]     Pertinent cultures to date:     Results     Procedure Component Value Units Date/Time    BLOOD CULTURE [[id number]] Collected: 12/01/21 1345    Order Status: Completed Specimen: Blood from Peripheral Updated: 12/02/21 0744     Significant Indicator NEG     Source BLD     Site PERIPHERAL     Culture  "Result No Growth  Note: Blood cultures are incubated for 5 days and  are monitored continuously.Positive blood cultures  are called to the RN and reported as soon as  they are identified.      Narrative:      1 of 2 for Blood Culture x 2 sites order. Per Hospital  Policy: Only change Specimen Src: to \"Line\" if specified by  physician order.  No site indicated    BLOOD CULTURE [[id number]] Collected: 12/01/21 2320    Order Status: Completed Specimen: Blood from Peripheral Updated: 12/02/21 0744     Significant Indicator NEG     Source BLD     Site PERIPHERAL     Culture Result No Growth  Note: Blood cultures are incubated for 5 days and  are monitored continuously.Positive blood cultures  are called to the RN and reported as soon as  they are identified.      Narrative:      2 of 2 blood culture x2  Sites order. Per Hospital Policy:  Only change Specimen Src: to \"Line\" if specified by physician  order.  Right AC    URINALYSIS CULTURE, IF INDICATED [[id number]]  (Abnormal) Collected: 12/01/21 1832    Order Status: Completed Specimen: Urine Updated: 12/01/21 1918     Color DK Yellow     Character Cloudy     Specific Gravity 1.030     Ph 5.0     Glucose Negative mg/dL      Ketones Negative mg/dL      Protein 30 mg/dL      Bilirubin Negative     Urobilinogen, Urine 0.2     Nitrite Negative     Leukocyte Esterase Trace     Occult Blood Trace     Micro Urine Req Microscopic    Narrative:      Indication for culture:->Patient WITHOUT an indwelling Perez  catheter in place with new onset of Dysuria, Frequency,  Urgency, and/or Suprapubic pain    URINALYSIS [[id number]]     Order Status: Canceled Specimen: Urine           Labs:     Estimated Creatinine Clearance: 25.1 mL/min (A) (by C-G formula based on SCr of 2.83 mg/dL (H)).  Recent Labs     12/01/21  1345 12/02/21  0806 12/02/21  0936 12/02/21  0936 12/02/21  1144 12/02/21  1840 12/03/21  0112 12/03/21  0515 12/03/21  1115   WBC 17.4*   < > 13.9*   < > 11.2* 8.7 14.7* 14.6* " "14.9*   NEUTSPOLYS 85.40*  --  43.50*  --   --   --   --  89.50*  --    BANDSSTABS  --   --  20.00*  --   --   --   --  6.10  --     < > = values in this interval not displayed.     Recent Labs     21  1345 21  1345 21  0617 21  0617 21  0936 21  0936 21  1144 21  1840 21  0112 21  0515 21  1115   BUN 27*   < > 35*   < > 31*   < > 31* 32* 37* 40* 42*   CREATININE 0.91   < > 1.65*   < > 1.59*   < > 1.67* 2.01* 2.49* 2.37* 2.83*   ALBUMIN 3.6  --  2.0*  --  1.5*  --   --   --   --  2.3*  --     < > = values in this interval not displayed.       Intake/Output Summary (Last 24 hours) at 12/3/2021 1633  Last data filed at 12/3/2021 0904  Gross per 24 hour   Intake 985.54 ml   Output 550 ml   Net 435.54 ml      Blood Pressure 112/80   Pulse (Abnormal) 58   Temperature (Abnormal) 38.5 °C (101.3 °F) (Bladder)   Respiration (Abnormal) 32   Height 1.626 m (5' 4\")   Weight 99.5 kg (219 lb 5.7 oz)   Oxygen Saturation 97%  Temp (24hrs), Av.6 °C (99.6 °F), Min:36.7 °C (98.1 °F), Max:38.5 °C (101.3 °F)      List concerns for Vancomycin clearance:     BUN/Scr ratio greater than 20:1;Obesity;MARY LOU;Pressors/Hypotension    Pharmacokinetics:     Trough kinetics:   Recent Labs     21  1215   VANCORANDOM 23.3       A/P:     -  Vancomycin dose: 1500 mg IV x 1 tomorrow AM     -  Next vancomycin level(s): TBD pending renal function     -  Comments: Supra-therapeutic random level drawn 24 hours after loading dose administered.  Multiple risk factors for accumulation.  Will continue with pulse dosing and a pulse dose is scheduled for tomorrow morning.  Follow-up level to be determined based on renal function.      Dannielle Soares, PharmD, BCPS, BCCCP    "

## 2021-12-04 NOTE — PROGRESS NOTES
Critical Care Progress Note    Date of admission  12/1/2021    Chief Complaint  56 y.o. female admitted 12/1/2021 with acute pancreatitis, pulmonary embolism.  She has a history of prior gastrointestinal bleeding from unknown source, MRSA sinusitis on outpatient antibiotics, Murray's disease, GERD, primary hypertension, hemochromatosis, hypothyroidism, traumatic brain injury, myocardial infarction and fibromyalgia.    Hospital Course      12/2 -    vent day 1.  Trend hemoglobin and platelet count and transfuse blood products as required.  Full vent support.  Inhaled Flolan for RV dysfunction following cardiac arrest.  12/3 -    vent day 2.  Start bicarbonate drip for MARY LOU.  Titrating norepinephrine.  Wean off Flolan.  12/4 -    vent day 3.  Renal function worse.  Titrating norepinephrine.      Interval Problem Update  Reviewed last 24 hour events:      SR  Low UOP  100.4  +2673 mL in the last 24  +4468 mL since admit  Norepinephrine titrating  Vasopressin  Decrease bicarbonate drip  Transfuse platelets  Increase sliding scale insulin      Review of Systems  Review of Systems   Unable to perform ROS: Acuity of condition        Vital Signs for last 24 hours   Pulse:  [57-82] 67  Resp:  [14-37] 25  SpO2:  [84 %-100 %] 94 %    Hemodynamic parameters for last 24 hours       Respiratory Information for the last 24 hours  Vent Mode: Spont  Rate (breaths/min): 28  Vt Target (mL): 330  PEEP/CPAP: 8  P Support: 5  MAP: 13  Length of Weaning Trial (Hours): 5 min  Control VTE (exp VT): 330    Physical Exam   Physical Exam  Constitutional:       Appearance: She is not diaphoretic.      Comments: On ventilator   HENT:      Head: Normocephalic.      Nose: Nose normal.      Mouth/Throat:      Mouth: Mucous membranes are moist.      Pharynx: Oropharynx is clear.   Eyes:      Conjunctiva/sclera: Conjunctivae normal.      Pupils: Pupils are equal, round, and reactive to light.   Cardiovascular:      Pulses: Normal pulses.       Comments: Sinus rhythm  Pulmonary:      Breath sounds: Rales (Scattered crackles) present. No wheezing.   Abdominal:      General: There is distension.      Tenderness: There is abdominal tenderness.      Comments: Hypoactive bowel sounds   Musculoskeletal:      Cervical back: Normal range of motion.      Comments: No clubbing or cyanosis   Skin:     General: Skin is warm.   Neurological:      Comments: Sedated, arouses and nods.         Medications  Current Facility-Administered Medications   Medication Dose Route Frequency Provider Last Rate Last Admin   • Pharmacy Consult: Enteral tube insertion - review meds/change route/product selection  1 Each Other PHARMACY TO DOSE Eligio Del Castillo M.D.       • insulin regular (HumuLIN R,NovoLIN R) injection  2-9 Units Subcutaneous Q6HRS Eligio Del Castillo M.D.        And   • dextrose 50% (D50W) injection 50 mL  50 mL Intravenous Q15 MIN PRN Eligio Del Castillo M.D.       • acetaminophen (TYLENOL) suppository 650 mg  650 mg Rectal Q4HRS PRN Martha Mirza M.D.   650 mg at 12/03/21 0250   • sodium bicarbonate 150 mEq in D5W infusion (premix)   Intravenous Continuous Eligio Del Castillo M.D. 125 mL/hr at 12/04/21 0236 New Bag at 12/04/21 0236   • levothyroxine (SYNTHROID) injection 38 mcg  38 mcg Intravenous DAILY Eligio Del Castillo M.D.   38 mcg at 12/04/21 0614   • MD Alert...Vancomycin per Pharmacy   Other PHARMACY TO DOSE Eligio Del Castillo M.D.       • cefTRIAXone (Rocephin) 2 g in  mL IVPB  2 g Intravenous Q24HRS Eligio Del Castillo M.D.   Stopped at 12/04/21 0644   • norepinephrine (Levophed) 8 mg in 250 mL NS infusion (premix)  0-50 mcg/min Intravenous Continuous Eligio Del Castillo M.D. 3.8 mL/hr at 12/04/21 0514 2 mcg/min at 12/04/21 0514   • hydrocortisone sodium succinate PF (Solu-CORTEF) 100 MG injection 100 mg  100 mg Intravenous Q8HRS Rudolph Foy D.O.   100 mg at 12/04/21 0614   • pantoprazole (PROTONIX)  injection 40 mg  40 mg Intravenous BID Fausto Jay M.D.   40 mg at 12/04/21 0614   • Respiratory Therapy Consult   Nebulization Continuous RT Servando Rush M.D.       • MD Alert...ICU Electrolyte Replacement per Pharmacy   Other PHARMACY TO DOSE Servando Rush M.D.       • lidocaine (XYLOCAINE) 1 % injection 2 mL  2 mL Tracheal Tube Q30 MIN PRN Servando Rush M.D.       • dexmedetomidine (PRECEDEX) 400 mcg/100mL NS premix infusion  0.1-1.5 mcg/kg/hr Intravenous Continuous Eligio Del Castillo M.D.   Stopped at 12/03/21 1300   • HYDROmorphone (DILAUDID) 0.2 mg/mL in 50mL NS (Continuous Infusion)   Intravenous Continuous Eligio Del Castillo M.D.   Stopped at 12/04/21 0604   • HYDROmorphone (Dilaudid) injection 0.5-2 mg  0.5-2 mg Intravenous Q HOUR PRN Eligio Del Castillo M.D.   1 mg at 12/04/21 0438   • vasopressin (VASOSTRICT) 20 Units in  mL Infusion  0.03 Units/min Intravenous Continuous Eligio Del Castillo M.D. 9 mL/hr at 12/03/21 1200 0.03 Units/min at 12/03/21 1200   • senna-docusate (PERICOLACE or SENOKOT S) 8.6-50 MG per tablet 2 Tablet  2 Tablet Oral BID David Yoder M.D.   2 Tablet at 12/04/21 0614    And   • polyethylene glycol/lytes (MIRALAX) PACKET 1 Packet  1 Packet Oral QDAY PRN David Yoder M.D.        And   • magnesium hydroxide (MILK OF MAGNESIA) suspension 30 mL  30 mL Oral QDAY PRN David Yoder M.D.        And   • bisacodyl (DULCOLAX) suppository 10 mg  10 mg Rectal QDAY PRN David Yoder M.D.       • Pharmacy Consult Request ...Pain Management Review 1 Each  1 Each Other PHARMACY TO DOSE David Yoder M.D.       • ondansetron (ZOFRAN) syringe/vial injection 4 mg  4 mg Intravenous Q4HRS PRN David E Paresh, M.D.       • ondansetron (ZOFRAN ODT) dispertab 4 mg  4 mg Oral Q4HRS PRN David Yoder M.D.       • promethazine (PHENERGAN) suppository 12.5-25 mg  12.5-25 mg Rectal Q4HRS PRN David Yoder M.D.           Fluids    Intake/Output Summary (Last 24  hours) at 12/4/2021 0732  Last data filed at 12/4/2021 0507  Gross per 24 hour   Intake 3178.05 ml   Output 365 ml   Net 2813.05 ml       Laboratory  Recent Labs     12/02/21  1609 12/03/21  0232 12/04/21 0229   ISTATAPH 7.294* 7.400 7.512*   ISTATAPCO2 35.7 27.8 32.5   ISTATAPO2 294* 195* 91*   ISTATATCO2 18* 18* 27   [id number] 100* 100* 98   ISTATARTHCO3 17.3 17.2 26.1*   ISTATARTBE -8* -6* 3   ISTATTEMP 36.1 C 38.6 C 98.0 F   ISTATFIO2 100 90 50   ISTATSPEC Arterial Arterial Arterial   ISTATAPHTC 7.306* 7.377* 7.517*   [id number] 290* 204* 89*         Recent Labs     12/03/21  0515 12/03/21 1115 12/04/21 0225   SODIUM 145 144 141   POTASSIUM 5.5 5.4 5.2   CHLORIDE 112 112 102   CO2 15* 16* 22   BUN 40* 42* 50*   CREATININE 2.37* 2.83* 3.25*   MAGNESIUM 1.5  --  2.6*   PHOSPHORUS 7.9*  --  7.0*   CALCIUM 7.5* 7.5* 7.1*     Recent Labs     12/01/21  1623 12/02/21  0617 12/02/21  0936 12/02/21  1144 12/03/21  0515 12/03/21  1115 12/04/21 0225   ALTSGPT  --    < > 39  --  120*  --  101*   ASTSGOT  --    < > 68*  --  345*  --  167*   ALKPHOSPHAT  --    < > 75  --  112*  --  159*   TBILIRUBIN  --    < > 0.3  --  1.5  --  1.2   LIPASE 1275*  --   --   --  548*  --  219*   GLUCOSE  --    < > 127*   < > 88 113* 235*    < > = values in this interval not displayed.     Recent Labs     12/02/21  0936 12/02/21  1144 12/03/21  0515 12/03/21  1115 12/04/21  0042 12/04/21  0225 12/04/21  0625   WBC 13.9*   < > 14.6*   < > 17.0* 16.9* 15.7*   NEUTSPOLYS 43.50*  --  89.50*  --   --  80.20*  --    LYMPHOCYTES 17.40*  --  0.00*  --   --  3.40*  --    MONOCYTES 5.20  --  0.90  --   --  6.00  --    EOSINOPHILS 0.00  --  0.00  --   --  0.00  --    BASOPHILS 0.90  --  0.00  --   --  0.00  --    ASTSGOT 68*  --  345*  --   --  167*  --    ALTSGPT 39  --  120*  --   --  101*  --    ALKPHOSPHAT 75  --  112*  --   --  159*  --    TBILIRUBIN 0.3  --  1.5  --   --  1.2  --     < > = values in this interval not displayed.     Recent  Labs     12/01/21  1345 12/01/21  1345 12/01/21  1832 12/01/21  1832 12/01/21  2320 12/02/21  0806 12/02/21  0936 12/02/21  1144 12/03/21  0515 12/03/21  1115 12/04/21  0042 12/04/21  0225 12/04/21  0625   RBC 6.13*  --   --   --   --    < > 3.84*   < > 4.20   < > 4.15* 4.20 3.78*   HEMOGLOBIN 12.6  --   --   --   --    < > 10.5*   < > 11.6*   < > 11.6* 11.3* 10.3*   HEMATOCRIT 45.2  --   --   --   --    < > 35.6*   < > 33.3*   < > 32.3* 32.7* 29.8*   PLATELETCT 242  --   --   --   --    < > 57*   < > 41*   < > 31* 32* 28*   PROTHROMBTM 15.5*   < > 16.3*   < > 17.4*  --  26.2*  --  20.9*  --   --  16.0*  --    APTT 28.8  --  31.0  --  137.5*  --   --   --   --   --   --   --   --    INR 1.26*   < > 1.35*   < > 1.47*  --  2.50*  --  1.86*  --   --  1.32*  --     < > = values in this interval not displayed.       Imaging  X-Ray:  I have personally reviewed the images and compared with prior images. and My impression is: Slightly increased bibasilar subsegmental atelectasis    Assessment/Plan  * Cardiac arrest (HCC)  Assessment & Plan  PEA, asystole and ventricular tachycardia in ED  ROSC after 7 rounds of CPR, IV epinephrine, IV bicarbonate solution and massive transfusion protocol  Echo with RV dysfunction and RVSP of 50 mmHg    Gastrointestinal hemorrhage  Assessment & Plan  History of gastrointestinal hemorrhage with no source identified  Suspect gastrointestinal hemorrhage provoked by IV heparin for pulmonary embolism - no gross blood noted in stool  IV pantoprazole twice daily  Trend hemoglobin and transfuse PRBCs to keep hemoglobin greater than 7    Shock (HCC)- (present on admission)  Assessment & Plan  Hemorrhagic shock with acute gastrointestinal blood loss and vasodistributive shock  Continue IV fluid resuscitation  I am titrating norepinephrine to keep mean arterial pressure greater than 65    Acute respiratory failure with hypoxia (HCC)- (present on admission)  Assessment & Plan  Intubated 12/2  All of the  appropriate ventilator bundles are in place  SAT/SBT as appropriate  Mobility level 1-2    Hemoperitoneum  Assessment & Plan  Surgery on board  No indication for surgery at this time  Trend hemoglobin and transfuse PRBCs to keep hemoglobin greater than 7    DVT (deep venous thrombosis) (HCC)  Assessment & Plan  Imaging reveals LUE DVT associated with her PICC line and a distal LLE DVT  Anticoagulation strictly contraindicated due to acute gastrointestinal hemorrhage and hemoperitoneum  She will require an IVC filter when improved    Acute pancreatitis  Assessment & Plan  Lipase improved  She is having bowel movements  Start enteral tube feedings    PE (pulmonary thromboembolism) (HCC)- (present on admission)  Assessment & Plan  Anticoagulation is contraindicated  She will require IVC filter placement    Acute blood loss anemia- (present on admission)  Assessment & Plan  Suspect gastrointestinal source of blood loss as well as acute hemoperitoneum  No gross blood in stool  Trend hemoglobin and transfuse PRBCs to keep hemoglobin greater than 7    Thrombocytopenia (HCC)- (present on admission)  Assessment & Plan  Trend platelet count and thromboelastogram with platelet mapping  Transfuse platelets as required    Adrenal insufficiency (Valentín's disease) (HCC)- (present on admission)  Assessment & Plan  Continue high-dose hydrocortisone, 100 mg IV every 8 hours    Elevated LFTs- (present on admission)  Assessment & Plan  Due to ischemic hepatopathy from cardiac arrest and shock  Trend enzymes and synthetic function  Avoid hepatotoxins  Improving    Acute kidney injury (HCC)- (present on admission)  Assessment & Plan  Suspect ATN due to shock in lady with underlying atrophic kidneys  Monitor renal function and urine output  Avoid nephrotoxins and renal dose medications  Continue bicarbonate drip    Primary hypertension- (present on admission)  Assessment & Plan  Hold antihypertensives    Hypocalcemia  Assessment &  Plan  Replete calcium    Hemochromatosis- (present on admission)  Assessment & Plan  History of    MRSA and E. coli sinusitis  Assessment & Plan  On outpatient IV antibiotics - LUE PICC in place  Continue vancomycin and ceftriaxone    Hypothyroidism- (present on admission)  Assessment & Plan  Continue levothyroxine    Obesity (BMI 35.0-39.9 without comorbidity)- (present on admission)  Assessment & Plan          VTE:  Contraindicated  Ulcer: PPI  Lines: Central Line  Ongoing indication addressed, Arterial Line  Ongoing indication addressed and Perez Catheter  Ongoing indication addressed    I have performed a physical exam and reviewed and updated ROS and Plan today (12/4/2021). In review of yesterday's note (12/3/2021), there are no changes except as documented above.     I have assessed and reassessed her respiratory status with ventilator adjustments, airway mechanics, ventilator waveforms, blood pressure, hemodynamics, cardiovascular status with titration of norepinephrine, urine output, serial determinations of hemoglobin and platelet count and her neurologic status.  She is at increased risk for worsening respiratory, cardiovascular, renal, gastrointestinal system dysfunction.    Discussed patient condition and risk of morbidity and/or mortality with RN, RT, Pharmacy, Charge nurse / hot rounds and QA team     The patient remains critically ill.  Critical care time = 95 minutes in directly providing and coordinating critical care and extensive data review.  No time overlap and excludes procedures.    Eligio Del Castillo MD  Pulmonary and Critical Care Medicine

## 2021-12-04 NOTE — PROGRESS NOTES
Pt did not have consent for blood products. AILYN Aguayo verified with AILYN Owens over the phone with , Colin, in regards to receiving blood products. Pt spouse verbalized that it was ok for pt to receive one unit of platelets.

## 2021-12-04 NOTE — CARE PLAN
Problem: Nutritional:  Goal: Nutrition support tolerated and meeting greater than 85% of estimated needs  Outcome: Progressing     TF initiated per protocol.

## 2021-12-05 ENCOUNTER — APPOINTMENT (OUTPATIENT)
Dept: RADIOLOGY | Facility: MEDICAL CENTER | Age: 56
DRG: 981 | End: 2021-12-05
Attending: INTERNAL MEDICINE
Payer: MEDICARE

## 2021-12-05 LAB
ALBUMIN SERPL BCP-MCNC: 2.4 G/DL (ref 3.2–4.9)
ALBUMIN/GLOB SERPL: 1 G/DL
ALP SERPL-CCNC: 148 U/L (ref 30–99)
ALT SERPL-CCNC: 74 U/L (ref 2–50)
ANION GAP SERPL CALC-SCNC: 13 MMOL/L (ref 7–16)
ANISOCYTOSIS BLD QL SMEAR: ABNORMAL
ANISOCYTOSIS BLD QL SMEAR: ABNORMAL
AST SERPL-CCNC: 84 U/L (ref 12–45)
BASE EXCESS BLDA CALC-SCNC: 9 MMOL/L (ref -4–3)
BASOPHILS # BLD AUTO: 0 % (ref 0–1.8)
BASOPHILS # BLD AUTO: 0 % (ref 0–1.8)
BASOPHILS # BLD: 0 K/UL (ref 0–0.12)
BASOPHILS # BLD: 0 K/UL (ref 0–0.12)
BILIRUB SERPL-MCNC: 0.7 MG/DL (ref 0.1–1.5)
BODY TEMPERATURE: ABNORMAL DEGREES
BUN SERPL-MCNC: 56 MG/DL (ref 8–22)
BURR CELLS BLD QL SMEAR: ABNORMAL
BURR CELLS BLD QL SMEAR: NORMAL
CA-I SERPL-SCNC: 1 MMOL/L (ref 1.1–1.3)
CALCIUM SERPL-MCNC: 7.6 MG/DL (ref 8.5–10.5)
CHLORIDE SERPL-SCNC: 101 MMOL/L (ref 96–112)
CO2 BLDA-SCNC: 32 MMOL/L (ref 20–33)
CO2 SERPL-SCNC: 29 MMOL/L (ref 20–33)
CREAT SERPL-MCNC: 3.21 MG/DL (ref 0.5–1.4)
CRP SERPL HS-MCNC: 24.51 MG/DL (ref 0–0.75)
DELSYS IDSYS: ABNORMAL
DOHLE BOD BLD QL SMEAR: ABNORMAL
END TIDAL CARBON DIOXIDE IECO2: 28 MMHG
EOSINOPHIL # BLD AUTO: 0 K/UL (ref 0–0.51)
EOSINOPHIL # BLD AUTO: 0 K/UL (ref 0–0.51)
EOSINOPHIL NFR BLD: 0 % (ref 0–6.9)
EOSINOPHIL NFR BLD: 0 % (ref 0–6.9)
ERYTHROCYTE [DISTWIDTH] IN BLOOD BY AUTOMATED COUNT: 67.7 FL (ref 35.9–50)
ERYTHROCYTE [DISTWIDTH] IN BLOOD BY AUTOMATED COUNT: 68.7 FL (ref 35.9–50)
GLOBULIN SER CALC-MCNC: 2.3 G/DL (ref 1.9–3.5)
GLUCOSE BLD-MCNC: 201 MG/DL (ref 65–99)
GLUCOSE BLD-MCNC: 219 MG/DL (ref 65–99)
GLUCOSE BLD-MCNC: 219 MG/DL (ref 65–99)
GLUCOSE SERPL-MCNC: 241 MG/DL (ref 65–99)
HCO3 BLDA-SCNC: 31 MMOL/L (ref 17–25)
HCT VFR BLD AUTO: 29.6 % (ref 37–47)
HCT VFR BLD AUTO: 29.9 % (ref 37–47)
HEMOCCULT STL QL: POSITIVE
HGB BLD-MCNC: 10 G/DL (ref 12–16)
HGB BLD-MCNC: 10.1 G/DL (ref 12–16)
HOROWITZ INDEX BLDA+IHG-RTO: 140 MM[HG]
INR PPP: 1.19 (ref 0.87–1.13)
LIPASE SERPL-CCNC: 55 U/L (ref 11–82)
LYMPHOCYTES # BLD AUTO: 0.1 K/UL (ref 1–4.8)
LYMPHOCYTES # BLD AUTO: 0.49 K/UL (ref 1–4.8)
LYMPHOCYTES NFR BLD: 0.8 % (ref 22–41)
LYMPHOCYTES NFR BLD: 3.5 % (ref 22–41)
MACROCYTES BLD QL SMEAR: ABNORMAL
MAGNESIUM SERPL-MCNC: 2.5 MG/DL (ref 1.5–2.5)
MANUAL DIFF BLD: ABNORMAL
MANUAL DIFF BLD: NORMAL
MCH RBC QN AUTO: 27.2 PG (ref 27–33)
MCH RBC QN AUTO: 27.4 PG (ref 27–33)
MCHC RBC AUTO-ENTMCNC: 33.4 G/DL (ref 33.6–35)
MCHC RBC AUTO-ENTMCNC: 34.1 G/DL (ref 33.6–35)
MCV RBC AUTO: 80.2 FL (ref 81.4–97.8)
MCV RBC AUTO: 81.3 FL (ref 81.4–97.8)
MICROCYTES BLD QL SMEAR: ABNORMAL
MICROCYTES BLD QL SMEAR: ABNORMAL
MODE IMODE: ABNORMAL
MONOCYTES # BLD AUTO: 0.52 K/UL (ref 0–0.85)
MONOCYTES # BLD AUTO: 0.75 K/UL (ref 0–0.85)
MONOCYTES NFR BLD AUTO: 4.3 % (ref 0–13.4)
MONOCYTES NFR BLD AUTO: 5.3 % (ref 0–13.4)
MORPHOLOGY BLD-IMP: NORMAL
MORPHOLOGY BLD-IMP: NORMAL
MYELOCYTES NFR BLD MANUAL: 2.7 %
NEUTROPHILS # BLD AUTO: 11.39 K/UL (ref 2–7.15)
NEUTROPHILS # BLD AUTO: 12.48 K/UL (ref 2–7.15)
NEUTROPHILS NFR BLD: 81.4 % (ref 44–72)
NEUTROPHILS NFR BLD: 94.9 % (ref 44–72)
NEUTS BAND NFR BLD MANUAL: 7.1 % (ref 0–10)
NRBC # BLD AUTO: 0.03 K/UL
NRBC BLD-RTO: 0.3 /100 WBC
O2/TOTAL GAS SETTING VFR VENT: 40 %
OVALOCYTES BLD QL SMEAR: ABNORMAL
OVALOCYTES BLD QL SMEAR: NORMAL
PCO2 BLDA: 33.2 MMHG (ref 26–37)
PCO2 TEMP ADJ BLDA: 32 MMHG (ref 26–37)
PEEP END EXPIRATORY PRESSURE IPEEP: 8 CMH20
PERCENT MINUTE VOLUME IPMV: 140
PH BLDA: 7.58 [PH] (ref 7.4–7.5)
PH TEMP ADJ BLDA: 7.59 [PH] (ref 7.4–7.5)
PHOSPHATE SERPL-MCNC: 6.7 MG/DL (ref 2.5–4.5)
PLATELET # BLD AUTO: 55 K/UL (ref 164–446)
PLATELET # BLD AUTO: ABNORMAL K/UL (ref 164–446)
PLATELET BLD QL SMEAR: NORMAL
PLATELET BLD QL SMEAR: NORMAL
PO2 BLDA: 56 MMHG (ref 64–87)
PO2 TEMP ADJ BLDA: 53 MMHG (ref 64–87)
POIKILOCYTOSIS BLD QL SMEAR: ABNORMAL
POIKILOCYTOSIS BLD QL SMEAR: NORMAL
POLYCHROMASIA BLD QL SMEAR: ABNORMAL
POLYCHROMASIA BLD QL SMEAR: NORMAL
POTASSIUM SERPL-SCNC: 4.5 MMOL/L (ref 3.6–5.5)
PROT SERPL-MCNC: 4.7 G/DL (ref 6–8.2)
PROTHROMBIN TIME: 14.7 SEC (ref 12–14.6)
RBC # BLD AUTO: 3.68 M/UL (ref 4.2–5.4)
RBC # BLD AUTO: 3.69 M/UL (ref 4.2–5.4)
RBC BLD AUTO: PRESENT
RBC BLD AUTO: PRESENT
SAO2 % BLDA: 93 % (ref 93–99)
SCHISTOCYTES BLD QL SMEAR: ABNORMAL
SODIUM SERPL-SCNC: 143 MMOL/L (ref 135–145)
SPECIMEN DRAWN FROM PATIENT: ABNORMAL
TARGETS BLD QL SMEAR: ABNORMAL
TARGETS BLD QL SMEAR: NORMAL
TOXIC GRANULES BLD QL SMEAR: ABNORMAL
TRIGL SERPL-MCNC: 142 MG/DL (ref 0–149)
WBC # BLD AUTO: 12 K/UL (ref 4.8–10.8)
WBC # BLD AUTO: 14.1 K/UL (ref 4.8–10.8)

## 2021-12-05 PROCEDURE — 94799 UNLISTED PULMONARY SVC/PX: CPT

## 2021-12-05 PROCEDURE — 700101 HCHG RX REV CODE 250: Performed by: INTERNAL MEDICINE

## 2021-12-05 PROCEDURE — 700111 HCHG RX REV CODE 636 W/ 250 OVERRIDE (IP): Performed by: INTERNAL MEDICINE

## 2021-12-05 PROCEDURE — 84134 ASSAY OF PREALBUMIN: CPT

## 2021-12-05 PROCEDURE — 82803 BLOOD GASES ANY COMBINATION: CPT

## 2021-12-05 PROCEDURE — 770022 HCHG ROOM/CARE - ICU (200)

## 2021-12-05 PROCEDURE — 82272 OCCULT BLD FECES 1-3 TESTS: CPT

## 2021-12-05 PROCEDURE — 700105 HCHG RX REV CODE 258: Performed by: INTERNAL MEDICINE

## 2021-12-05 PROCEDURE — A9270 NON-COVERED ITEM OR SERVICE: HCPCS | Performed by: INTERNAL MEDICINE

## 2021-12-05 PROCEDURE — C9113 INJ PANTOPRAZOLE SODIUM, VIA: HCPCS | Performed by: STUDENT IN AN ORGANIZED HEALTH CARE EDUCATION/TRAINING PROGRAM

## 2021-12-05 PROCEDURE — 80053 COMPREHEN METABOLIC PANEL: CPT

## 2021-12-05 PROCEDURE — 84100 ASSAY OF PHOSPHORUS: CPT

## 2021-12-05 PROCEDURE — 82962 GLUCOSE BLOOD TEST: CPT

## 2021-12-05 PROCEDURE — 99291 CRITICAL CARE FIRST HOUR: CPT | Performed by: INTERNAL MEDICINE

## 2021-12-05 PROCEDURE — 84478 ASSAY OF TRIGLYCERIDES: CPT

## 2021-12-05 PROCEDURE — 82330 ASSAY OF CALCIUM: CPT

## 2021-12-05 PROCEDURE — 71045 X-RAY EXAM CHEST 1 VIEW: CPT

## 2021-12-05 PROCEDURE — 86140 C-REACTIVE PROTEIN: CPT

## 2021-12-05 PROCEDURE — 700102 HCHG RX REV CODE 250 W/ 637 OVERRIDE(OP): Performed by: INTERNAL MEDICINE

## 2021-12-05 PROCEDURE — 700111 HCHG RX REV CODE 636 W/ 250 OVERRIDE (IP): Performed by: STUDENT IN AN ORGANIZED HEALTH CARE EDUCATION/TRAINING PROGRAM

## 2021-12-05 PROCEDURE — 85027 COMPLETE CBC AUTOMATED: CPT

## 2021-12-05 PROCEDURE — 85610 PROTHROMBIN TIME: CPT

## 2021-12-05 PROCEDURE — 83735 ASSAY OF MAGNESIUM: CPT

## 2021-12-05 PROCEDURE — 99222 1ST HOSP IP/OBS MODERATE 55: CPT | Performed by: INTERNAL MEDICINE

## 2021-12-05 PROCEDURE — 700111 HCHG RX REV CODE 636 W/ 250 OVERRIDE (IP): Performed by: HOSPITALIST

## 2021-12-05 PROCEDURE — 83690 ASSAY OF LIPASE: CPT

## 2021-12-05 PROCEDURE — 99231 SBSQ HOSP IP/OBS SF/LOW 25: CPT | Performed by: SURGERY

## 2021-12-05 PROCEDURE — 37799 UNLISTED PX VASCULAR SURGERY: CPT

## 2021-12-05 PROCEDURE — 99292 CRITICAL CARE ADDL 30 MIN: CPT | Performed by: INTERNAL MEDICINE

## 2021-12-05 PROCEDURE — 85007 BL SMEAR W/DIFF WBC COUNT: CPT

## 2021-12-05 PROCEDURE — 94003 VENT MGMT INPAT SUBQ DAY: CPT

## 2021-12-05 RX ORDER — FUROSEMIDE 10 MG/ML
100 INJECTION INTRAMUSCULAR; INTRAVENOUS ONCE
Status: COMPLETED | OUTPATIENT
Start: 2021-12-05 | End: 2021-12-05

## 2021-12-05 RX ORDER — LIOTHYRONINE SODIUM 25 UG/1
25 TABLET ORAL
Status: DISCONTINUED | OUTPATIENT
Start: 2021-12-05 | End: 2021-12-14

## 2021-12-05 RX ORDER — CALCIUM GLUCONATE 20 MG/ML
2 INJECTION, SOLUTION INTRAVENOUS ONCE
Status: COMPLETED | OUTPATIENT
Start: 2021-12-05 | End: 2021-12-05

## 2021-12-05 RX ORDER — DEXTROSE MONOHYDRATE 25 G/50ML
50 INJECTION, SOLUTION INTRAVENOUS
Status: DISCONTINUED | OUTPATIENT
Start: 2021-12-05 | End: 2021-12-14

## 2021-12-05 RX ORDER — LEVOTHYROXINE SODIUM 0.07 MG/1
75 TABLET ORAL
Status: DISCONTINUED | OUTPATIENT
Start: 2021-12-06 | End: 2021-12-14

## 2021-12-05 RX ORDER — FLUDROCORTISONE ACETATE 0.1 MG/1
0.1 TABLET ORAL 2 TIMES DAILY
Status: DISCONTINUED | OUTPATIENT
Start: 2021-12-05 | End: 2021-12-14

## 2021-12-05 RX ORDER — HYDRALAZINE HYDROCHLORIDE 20 MG/ML
20 INJECTION INTRAMUSCULAR; INTRAVENOUS EVERY 4 HOURS PRN
Status: DISCONTINUED | OUTPATIENT
Start: 2021-12-05 | End: 2021-12-29 | Stop reason: HOSPADM

## 2021-12-05 RX ADMIN — LABETALOL HYDROCHLORIDE 20 MG: 5 INJECTION, SOLUTION INTRAVENOUS at 03:15

## 2021-12-05 RX ADMIN — FLUDROCORTISONE ACETATE 0.1 MG: 0.1 TABLET ORAL at 17:59

## 2021-12-05 RX ADMIN — CALCIUM GLUCONATE 2 G: 20 INJECTION, SOLUTION INTRAVENOUS at 10:12

## 2021-12-05 RX ADMIN — OMEPRAZOLE 40 MG: KIT at 17:59

## 2021-12-05 RX ADMIN — PROPOFOL 30 MCG/KG/MIN: 10 INJECTION, EMULSION INTRAVENOUS at 07:04

## 2021-12-05 RX ADMIN — HYDROMORPHONE HYDROCHLORIDE 2 MG: 1 INJECTION, SOLUTION INTRAMUSCULAR; INTRAVENOUS; SUBCUTANEOUS at 15:52

## 2021-12-05 RX ADMIN — HYDROCORTISONE SODIUM SUCCINATE 100 MG: 100 INJECTION, POWDER, FOR SOLUTION INTRAMUSCULAR; INTRAVENOUS at 06:08

## 2021-12-05 RX ADMIN — FLUDROCORTISONE ACETATE 0.1 MG: 0.1 TABLET ORAL at 10:12

## 2021-12-05 RX ADMIN — HYDROCORTISONE SODIUM SUCCINATE 50 MG: 100 INJECTION, POWDER, FOR SOLUTION INTRAMUSCULAR; INTRAVENOUS at 20:49

## 2021-12-05 RX ADMIN — HYDROCORTISONE SODIUM SUCCINATE 50 MG: 100 INJECTION, POWDER, FOR SOLUTION INTRAMUSCULAR; INTRAVENOUS at 15:29

## 2021-12-05 RX ADMIN — DOCUSATE SODIUM 50 MG AND SENNOSIDES 8.6 MG 2 TABLET: 8.6; 5 TABLET, FILM COATED ORAL at 06:08

## 2021-12-05 RX ADMIN — LEVOTHYROXINE SODIUM ANHYDROUS 38 MCG: 100 INJECTION, POWDER, LYOPHILIZED, FOR SOLUTION INTRAVENOUS at 06:07

## 2021-12-05 RX ADMIN — PANTOPRAZOLE SODIUM 40 MG: 40 INJECTION, POWDER, LYOPHILIZED, FOR SOLUTION INTRAVENOUS at 06:08

## 2021-12-05 RX ADMIN — MIDAZOLAM HYDROCHLORIDE 2 MG: 1 INJECTION, SOLUTION INTRAMUSCULAR; INTRAVENOUS at 03:37

## 2021-12-05 RX ADMIN — LIOTHYRONINE SODIUM 25 MCG: 25 TABLET ORAL at 10:12

## 2021-12-05 RX ADMIN — MIDAZOLAM HYDROCHLORIDE 2 MG: 1 INJECTION, SOLUTION INTRAMUSCULAR; INTRAVENOUS at 00:50

## 2021-12-05 RX ADMIN — Medication 2 MG: at 11:19

## 2021-12-05 RX ADMIN — FUROSEMIDE 100 MG: 10 INJECTION, SOLUTION INTRAMUSCULAR; INTRAVENOUS at 07:31

## 2021-12-05 RX ADMIN — HYDRALAZINE HYDROCHLORIDE 20 MG: 20 INJECTION INTRAMUSCULAR; INTRAVENOUS at 16:06

## 2021-12-05 RX ADMIN — Medication 2 MG: at 07:02

## 2021-12-05 RX ADMIN — Medication 1 MG/HR: at 21:00

## 2021-12-05 RX ADMIN — Medication 2 MG/HR: at 00:25

## 2021-12-05 RX ADMIN — NOREPINEPHRINE BITARTRATE 2 MCG/MIN: 1 INJECTION, SOLUTION, CONCENTRATE INTRAVENOUS at 07:41

## 2021-12-05 RX ADMIN — CEFTRIAXONE SODIUM 2 G: 2 INJECTION, POWDER, FOR SOLUTION INTRAMUSCULAR; INTRAVENOUS at 06:09

## 2021-12-05 RX ADMIN — LABETALOL HYDROCHLORIDE 20 MG: 5 INJECTION, SOLUTION INTRAVENOUS at 07:20

## 2021-12-05 RX ADMIN — PROPOFOL 10 MCG/KG/MIN: 10 INJECTION, EMULSION INTRAVENOUS at 18:13

## 2021-12-05 RX ADMIN — HYDRALAZINE HYDROCHLORIDE 20 MG: 20 INJECTION INTRAMUSCULAR; INTRAVENOUS at 21:00

## 2021-12-05 ASSESSMENT — PAIN DESCRIPTION - PAIN TYPE
TYPE: ACUTE PAIN

## 2021-12-05 NOTE — PROGRESS NOTES
Pt became hypertensive, agitated with systolics in the 190s and sustaining. 2mg dilaudid given with no effect. Dilaudid gtt rate changed to 2mg/ hr. Pt still agitated. Dr. Linda notified. Verbal order for mg haldol.

## 2021-12-05 NOTE — PROGRESS NOTES
Critical Care Progress Note    Date of admission  12/1/2021    Chief Complaint  56 y.o. female admitted 12/1/2021 with acute pancreatitis, pulmonary embolism.  She has a history of prior gastrointestinal bleeding from unknown source, MRSA sinusitis on outpatient antibiotics, [place]'s disease, GERD, primary hypertension, hemochromatosis, hypothyroidism, traumatic brain injury, myocardial infarction and fibromyalgia.    Hospital Course      12/2 -    vent day 1.  Trend hemoglobin and platelet count and transfuse blood products as required.  Full vent support.  Inhaled Flolan for RV dysfunction following cardiac arrest.  12/3 -    vent day 2.  Start bicarbonate drip for MARY LOU.  Titrating norepinephrine.  Wean off Flolan.  12/4 -    vent day 3.  Renal function worse.  Titrating norepinephrine.  12/5 -    vent day 4.  Blood pressure increased.  Change dexmedetomidine to propofol.      Interval Problem Update  Reviewed last 24 hour events:      SB  Dex 0.2  Dilaudid 2  Bicarbonate 75  98.2  +1910 mL in the last 24  +6518 mL since admit  Taper hydrocortisone      Review of Systems  Review of Systems   Unable to perform ROS: Acuity of condition        Vital Signs for last 24 hours   Temp:  [36 °C (96.8 °F)-36.7 °C (98.1 °F)] 36.2 °C (97.2 °F)  Pulse:  [40-90] 61  Resp:  [14-30] 26  BP: (109-161)/(65-87) 125/72  SpO2:  [88 %-100 %] 100 %    Hemodynamic parameters for last 24 hours       Respiratory Information for the last 24 hours  Vent Mode: ASV  PEEP/CPAP: 10  MAP: 16  Control VTE (exp VT): 254    Physical Exam   Physical Exam  Constitutional:       Appearance: She is not diaphoretic.      Comments: On ventilator   HENT:      Head: Normocephalic.      Nose: Nose normal.      Mouth/Throat:      Mouth: Mucous membranes are moist.      Pharynx: Oropharynx is clear.   Eyes:      Conjunctiva/sclera: Conjunctivae normal.      Pupils: Pupils are equal, round, and reactive to light.   Cardiovascular:      Pulses: Normal pulses.       Comments: Sinus bradycardia  Pulmonary:      Breath sounds: Rales (Few crackles) present. No wheezing.   Abdominal:      General: There is distension.      Tenderness: There is abdominal tenderness.      Comments: Improving bowel sounds   Musculoskeletal:      Cervical back: Normal range of motion.      Comments: No clubbing or cyanosis   Skin:     General: Skin is warm.   Neurological:      Comments: Sedated, arouses and nods.         Medications  Current Facility-Administered Medications   Medication Dose Route Frequency Provider Last Rate Last Admin   • hydrocortisone sodium succinate PF (Solu-CORTEF) 100 MG injection 50 mg  50 mg Intravenous Q8HRS Eligio Del Castillo M.D.       • propofol (DIPRIVAN) injection  0-80 mcg/kg/min Intravenous Continuous Eligio Del Castillo M.D. 18.4 mL/hr at 12/05/21 0704 30 mcg/kg/min at 12/05/21 0704   • hydrALAZINE (APRESOLINE) injection 20 mg  20 mg Intravenous Q4HRS PRN Eligio Del Castillo M.D.       • furosemide (LASIX) injection 100 mg  100 mg Intravenous Once Eligio Del Castillo M.D.       • Pharmacy Consult: Enteral tube insertion - review meds/change route/product selection  1 Each Other PHARMACY TO DOSE Eligio Del Castillo M.D.       • insulin regular (HumuLIN R,NovoLIN R) injection  2-9 Units Subcutaneous Q6HRS Eligio Del Castillo M.D.   3 Units at 12/05/21 0700    And   • dextrose 50% (D50W) injection 50 mL  50 mL Intravenous Q15 MIN PRN Eligio Del Castillo M.D.       • senna-docusate (PERICOLACE or SENOKOT S) 8.6-50 MG per tablet 2 Tablet  2 Tablet Enteral Tube BID Eligio Del Castillo M.D.   2 Tablet at 12/05/21 0608    And   • polyethylene glycol/lytes (MIRALAX) PACKET 1 Packet  1 Packet Enteral Tube QDAY PRN Eligio Del Castillo M.D.        And   • magnesium hydroxide (MILK OF MAGNESIA) suspension 30 mL  30 mL Enteral Tube QDAY PRN Eligio Del Castillo M.D.        And   • bisacodyl (DULCOLAX) suppository 10 mg  10 mg Rectal QDAY PRN Eligio  ANJELICA Del Castillo M.D.       • ondansetron (ZOFRAN ODT) dispertab 4 mg  4 mg Enteral Tube Q4HRS PRN Eligio Del Castillo M.D.       • labetalol (NORMODYNE/TRANDATE) injection 10-20 mg  10-20 mg Intravenous Q4HRS PRN Eligio Del Castillo M.D.   20 mg at 12/05/21 0315   • acetaminophen (TYLENOL) suppository 650 mg  650 mg Rectal Q4HRS PRN Martha Mirza M.D.   650 mg at 12/03/21 0250   • levothyroxine (SYNTHROID) injection 38 mcg  38 mcg Intravenous DAILY Eligio Del Castillo M.D.   38 mcg at 12/05/21 0607   • MD Alert...Vancomycin per Pharmacy   Other PHARMACY TO DOSE Eligio Del Castillo M.D.       • cefTRIAXone (Rocephin) 2 g in  mL IVPB  2 g Intravenous Q24HRS Eligio Del Castillo M.D.   Stopped at 12/05/21 0639   • norepinephrine (Levophed) 8 mg in 250 mL NS infusion (premix)  0-50 mcg/min Intravenous Continuous Eligio Del Castillo M.D. 1.9 mL/hr at 12/04/21 1600 1 mcg/min at 12/04/21 1600   • pantoprazole (PROTONIX) injection 40 mg  40 mg Intravenous BID Fausto Jay M.D.   40 mg at 12/05/21 0608   • Respiratory Therapy Consult   Nebulization Continuous RT Servando Rsuh M.D.       • MD Alert...ICU Electrolyte Replacement per Pharmacy   Other PHARMACY TO DOSE Servando Rush M.D.       • lidocaine (XYLOCAINE) 1 % injection 2 mL  2 mL Tracheal Tube Q30 MIN PRN Servando Rush M.D.       • HYDROmorphone (DILAUDID) 0.2 mg/mL in 50mL NS (Continuous Infusion)   Intravenous Continuous Eligio Del Castillo M.D. 10 mL/hr at 12/05/21 0025 2 mg at 12/05/21 0702   • HYDROmorphone (Dilaudid) injection 0.5-2 mg  0.5-2 mg Intravenous Q HOUR PRN Eligio Del Castillo M.D.   2 mg at 12/04/21 8775   • vasopressin (VASOSTRICT) 20 Units in  mL Infusion  0.03 Units/min Intravenous Continuous Eligio Del Castillo M.D.   Stopped at 12/04/21 0800   • Pharmacy Consult Request ...Pain Management Review 1 Each  1 Each Other PHARMACY TO DOSE David Yoder M.D.       • ondansetron (ZOFRAN)  syringe/vial injection 4 mg  4 mg Intravenous Q4HRS PRN David Yoder M.D.       • promethazine (PHENERGAN) suppository 12.5-25 mg  12.5-25 mg Rectal Q4HRS PRN David Yoder M.D.           Fluids    Intake/Output Summary (Last 24 hours) at 12/5/2021 0706  Last data filed at 12/5/2021 0551  Gross per 24 hour   Intake 2218 ml   Output 308 ml   Net 1910 ml       Laboratory  Recent Labs     12/03/21  0232 12/04/21 0229 12/05/21  0118   ISTATAPH 7.400 7.512* 7.579*   ISTATAPCO2 27.8 32.5 33.2   ISTATAPO2 195* 91* 56*   ISTATATCO2 18* 27 32   [id number] 100* 98 93   ISTATARTHCO3 17.2 26.1* 31.0*   ISTATARTBE -6* 3 9*   ISTATTEMP 38.6 C 98.0 F 36.2 C   ISTATFIO2 90 50 40   ISTATSPEC Arterial Arterial Arterial   ISTATAPHTC 7.377* 7.517* 7.591*   [id number] 204* 89* 53*         Recent Labs     12/03/21 0515 12/03/21 0515 12/03/21 1115 12/04/21 0225 12/05/21  0626   SODIUM 145   < > 144 141 143   POTASSIUM 5.5   < > 5.4 5.2 4.5   CHLORIDE 112   < > 112 102 101   CO2 15*   < > 16* 22 29   BUN 40*   < > 42* 50* 56*   CREATININE 2.37*   < > 2.83* 3.25* 3.21*   MAGNESIUM 1.5  --   --  2.6* 2.5   PHOSPHORUS 7.9*  --   --  7.0* 6.7*   CALCIUM 7.5*   < > 7.5* 7.1* 7.6*    < > = values in this interval not displayed.     Recent Labs     12/03/21 0515 12/03/21 0515 12/03/21  1115 12/04/21 0225 12/05/21  0626   ALTSGPT 120*  --   --  101* 74*   ASTSGOT 345*  --   --  167* 84*   ALKPHOSPHAT 112*  --   --  159* 148*   TBILIRUBIN 1.5  --   --  1.2 0.7   LIPASE 548*  --   --  219* 55   GLUCOSE 88   < > 113* 235* 241*    < > = values in this interval not displayed.     Recent Labs     12/03/21  0515 12/03/21  1115 12/04/21  0225 12/04/21  0225 12/04/21  0625 12/04/21  1400 12/04/21  2254 12/05/21  0626   WBC 14.6*   < > 16.9*   < > 15.7* 12.3* 14.1*  --    NEUTSPOLYS 89.50*  --  80.20*  --   --   --  81.40*  --    LYMPHOCYTES 0.00*  --  3.40*  --   --   --  3.50*  --    MONOCYTES 0.90  --  6.00  --   --   --  5.30  --     EOSINOPHILS 0.00  --  0.00  --   --   --  0.00  --    BASOPHILS 0.00  --  0.00  --   --   --  0.00  --    ASTSGOT 345*  --  167*  --   --   --   --  84*   ALTSGPT 120*  --  101*  --   --   --   --  74*   ALKPHOSPHAT 112*  --  159*  --   --   --   --  148*   TBILIRUBIN 1.5  --  1.2  --   --   --   --  0.7    < > = values in this interval not displayed.     Recent Labs     12/03/21  0515 12/03/21  1115 12/04/21  0225 12/04/21  0225 12/04/21  0625 12/04/21  1400 12/04/21  2254 12/05/21  0626   RBC 4.20   < > 4.20   < > 3.78* 3.49* 3.68*  --    HEMOGLOBIN 11.6*   < > 11.3*   < > 10.3* 9.7* 10.0*  --    HEMATOCRIT 33.3*   < > 32.7*   < > 29.8* 27.9* 29.9*  --    PLATELETCT 41*   < > 32*   < > 28* 56* See Note  --    PROTHROMBTM 20.9*  --  16.0*  --   --   --   --  14.7*   INR 1.86*  --  1.32*  --   --   --   --  1.19*    < > = values in this interval not displayed.       Imaging  X-Ray:  I have personally reviewed the images and compared with prior images. and My impression is: Bibasilar opacities are about the same    Assessment/Plan  * Cardiac arrest (HCC)  Assessment & Plan  PEA, asystole and ventricular tachycardia in ED  ROSC after 7 rounds of CPR, IV epinephrine, IV bicarbonate solution and massive transfusion protocol  Echo with RV dysfunction and RVSP of 50 mmHg    Gastrointestinal hemorrhage  Assessment & Plan  History of gastrointestinal hemorrhage with no source identified  Suspect gastrointestinal hemorrhage provoked by IV heparin for pulmonary embolism - no gross blood noted in stool  IV pantoprazole twice daily  Trend hemoglobin and transfuse PRBCs to keep hemoglobin greater than 7    Shock ([facility])- (present on admission)  Assessment & Plan  Hemorrhagic shock with acute gastrointestinal blood loss and vasodistributive shock  Shock has resolved    Acute respiratory failure with hypoxia ([facility])- (present on admission)  Assessment & Plan  Intubated 12/2  All of the appropriate ventilator bundles are in  place  SAT/SBT as appropriate  Mobility level 1-2    Hemoperitoneum  Assessment & Plan  Surgery on board  No indication for surgery at this time  Trend hemoglobin and transfuse PRBCs to keep hemoglobin greater than 7    DVT (deep venous thrombosis) (HCC)  Assessment & Plan  Imaging reveals LUE DVT associated with her PICC line and a distal LLE DVT  Anticoagulation strictly contraindicated due to acute gastrointestinal hemorrhage and hemoperitoneum  She will require an IVC filter when improved    Acute pancreatitis  Assessment & Plan  Lipase improved  She is having bowel movements  Advance enteral tube feedings    PE (pulmonary thromboembolism) (HCC)- (present on admission)  Assessment & Plan  Anticoagulation is contraindicated  She will require IVC filter placement    Acute blood loss anemia- (present on admission)  Assessment & Plan  Suspect gastrointestinal source of blood loss as well as acute hemoperitoneum  No gross blood in stool  Trend hemoglobin and transfuse PRBCs to keep hemoglobin greater than 7    Thrombocytopenia (HCC)- (present on admission)  Assessment & Plan  Trend platelet count and thromboelastogram with platelet mapping  Transfuse platelets as required    Adrenal insufficiency ([place]'s disease) (HCC)- (present on admission)  Assessment & Plan  Taper hydrocortisone, 50 mg IV every 8 hours    Elevated LFTs- (present on admission)  Assessment & Plan  Due to ischemic hepatopathy from cardiac arrest and shock  Trend enzymes and synthetic function  Avoid hepatotoxins    Acute kidney injury (HCC)- (present on admission)  Assessment & Plan  Suspect ATN due to shock in lady with underlying atrophic kidneys  Monitor renal function and urine output  Avoid nephrotoxins and renal dose medications  Force diuresis with furosemide  Stop bicarbonate drip    Primary hypertension- (present on admission)  Assessment & Plan  Goal SBP less than 160  Hydralazine as necessary to achieve blood pressure  goals    Hypocalcemia  Assessment & Plan  Replete calcium    Hemochromatosis- (present on admission)  Assessment & Plan  History of    MRSA and E. coli sinusitis  Assessment & Plan  On outpatient IV antibiotics - LUE PICC in place  Continue vancomycin and ceftriaxone    Hypothyroidism- (present on admission)  Assessment & Plan  Continue levothyroxine    Obesity (BMI 35.0-39.9 without comorbidity)- (present on admission)  Assessment & Plan          VTE:  Contraindicated  Ulcer: PPI  Lines: Central Line  Ongoing indication addressed, Arterial Line  Ongoing indication addressed and Perez Catheter  Ongoing indication addressed    I have performed a physical exam and reviewed and updated ROS and Plan today (12/5/2021). In review of yesterday's note (12/4/2021), there are no changes except as documented above.     I have assessed and reassessed her respiratory status with ventilator adjustments, airway mechanics, ventilator waveforms, blood pressure, hemodynamics, cardiovascular status with titration of norepinephrine, urine output, serial determinations of hemoglobin and platelet count and her neurologic status.  She is at increased risk for worsening respiratory, cardiovascular, renal, gastrointestinal system dysfunction.    Discussed patient condition and risk of morbidity and/or mortality with RN, RT, Pharmacy, Charge nurse / hot rounds and QA team     The patient remains critically ill.  Critical care time = 95 minutes in directly providing and coordinating critical care and extensive data review.  No time overlap and excludes procedures.    Eligio Del Castillo MD  Pulmonary and Critical Care Medicine

## 2021-12-05 NOTE — PROGRESS NOTES
Pt attempting to sit up in bed pulling at restraints. Agitated. Following commands. Bp 210/132 Hr 87. 1 Short run of Svt noted with rate of 150   Dr. Crum notified. Dex drip restarted. New orders received for HTN meds and versed. 2 MG dilaudid given for pain. And 2 mg versed given for agitation. Pt much more comfortable and resting at this time. Bp 103/66 HR 70. Will cont to monitor.

## 2021-12-05 NOTE — PROGRESS NOTES
Pt had episode of afib rvr with HR in the 150s sustaining. Blood pressure in the 180s systolic. 20mg IV labetalol given. HR in the low 100s now. After giving labetalol, pt self converted to sinus leoncio with HR in the low 50s. Blood pressure soft with a systolic in the 80s.

## 2021-12-05 NOTE — PROGRESS NOTES
Pt desatted to 81% with good waveform RT notified. Attempted to suction patient but pt has no cough or gag. Only on 20 of propofol and 2 mg dilaudid gtt. Prop now at 10. Dr. Del Castillo ordered stat chest xray

## 2021-12-05 NOTE — PROGRESS NOTES
"    ACS BLUE SERVICE  DATE: 12/5/2021    HD 5: Hemoperitoneum    Interval Events:  Hemoglobin stable  Quite active overnight, heavily sedated this morning    PHYSICAL EXAMINATION:  Constitutional:     Vital Signs: /72   Pulse 61   Temp 36.2 °C (97.2 °F)   Resp (!) 26   Ht 1.626 m (5' 4\")   Wt 106 kg (234 lb 2.1 oz)   SpO2 100%   GENERAL:  No acute distress  HEENT: Pupils equal, round and reactive to light bilaterally.  Sclera are clear bilaterally.  No otorrhea.  No rhinorrhea.  Mucus membranes are moist.  CHEST:  Lungs are clear to auscultation bilaterally  CARDIOVASCULAR:  Regular rate and rhythm  ABDOMEN: Soft, non-tender, non-distended  EXTREMITIES:  Good range of motion through out  SKIN:  Warm and well perfused, no rashes    Laboratory Values:   Recent Labs     12/02/21  1144 12/02/21  1615 12/04/21  1400 12/04/21  2254 12/05/21  0626   WBC 11.2*   < > 12.3* 14.1* 12.0*   RBC 4.27   < > 3.49* 3.68* 3.69*   HEMOGLOBIN 11.5*   < > 9.7* 10.0* 10.1*   HEMATOCRIT 37.1   < > 27.9* 29.9* 29.6*   MCV 86.9   < > 79.9* 81.3* 80.2*   MCH 26.9*   < > 27.8 27.2 27.4   MCHC 31.0*   < > 34.8 33.4* 34.1   RDW 66.3*   < > 65.6* 68.7* 67.7*   PLATELETCT 121*   < > 56* See Note 55*   MPV 10.6  --   --   --   --     < > = values in this interval not displayed.     Recent Labs     12/03/21  1115 12/04/21  0225 12/05/21  0626   SODIUM 144 141 143   POTASSIUM 5.4 5.2 4.5   CHLORIDE 112 102 101   CO2 16* 22 29   GLUCOSE 113* 235* 241*   BUN 42* 50* 56*   CREATININE 2.83* 3.25* 3.21*   CALCIUM 7.5* 7.1* 7.6*     Recent Labs     12/03/21  0515 12/04/21  0225 12/05/21  0626   ASTSGOT 345* 167* 84*   ALTSGPT 120* 101* 74*   TBILIRUBIN 1.5 1.2 0.7   ALKPHOSPHAT 112* 159* 148*   GLOBULIN 1.7* 2.3 2.3   INR 1.86* 1.32* 1.19*     Recent Labs     12/03/21  0515 12/04/21 0225 12/05/21  0626   INR 1.86* 1.32* 1.19*         ASSESSMENT AND PLAN:   1) Hemoperitoneum:    Likely secondary to blunt liver injury, perhaps from chest " compressions received during CPR, but this is not for certain.  We will managed this like solid abdominal organ injury seen in trauma.      -Serial hemoglobin  -Consideration for chemical DVT prophylaxis and monitor for further bleeding at the discretion of the critical care team    Aggregated care time spent evaluating, reassessing, reviewing documentation, providing care, and managing this patient exclusive of procedures: 35 minutes    Norman Coker MD, FACS     ____________________________________     Rodríguez Coker M.D.

## 2021-12-05 NOTE — CARE PLAN
Problem: Pain - Standard  Goal: Alleviation of pain or a reduction in pain to the patient’s comfort goal  Outcome: Progressing     Problem: Knowledge Deficit - Standard  Goal: Patient and family/care givers will demonstrate understanding of plan of care, disease process/condition, diagnostic tests and medications  Outcome: Progressing     Problem: Skin Integrity  Goal: Skin integrity is maintained or improved  Outcome: Progressing     Problem: Fall Risk  Goal: Patient will remain free from falls  Outcome: Progressing   The patient is Watcher - Medium risk of patient condition declining or worsening    Shift Goals  Clinical Goals: Hemodynamic stability  Patient Goals: N/A    Progress made toward(s) clinical / shift goals:  dialysis cath placed     Patient is not progressing towards the following goals:

## 2021-12-06 ENCOUNTER — APPOINTMENT (OUTPATIENT)
Dept: RADIOLOGY | Facility: MEDICAL CENTER | Age: 56
DRG: 981 | End: 2021-12-06
Attending: INTERNAL MEDICINE
Payer: MEDICARE

## 2021-12-06 PROBLEM — I48.0 PAROXYSMAL ATRIAL FIBRILLATION (HCC): Status: ACTIVE | Noted: 2021-12-06

## 2021-12-06 LAB
ALBUMIN SERPL BCP-MCNC: 2.5 G/DL (ref 3.2–4.9)
ALBUMIN/GLOB SERPL: 1 G/DL
ALP SERPL-CCNC: 181 U/L (ref 30–99)
ALT SERPL-CCNC: 69 U/L (ref 2–50)
ANION GAP SERPL CALC-SCNC: 14 MMOL/L (ref 7–16)
AST SERPL-CCNC: 73 U/L (ref 12–45)
BACTERIA BLD CULT: NORMAL
BASE EXCESS BLDA CALC-SCNC: 12 MMOL/L (ref -4–3)
BASOPHILS # BLD AUTO: 0 % (ref 0–1.8)
BASOPHILS # BLD: 0 K/UL (ref 0–0.12)
BILIRUB SERPL-MCNC: 0.6 MG/DL (ref 0.1–1.5)
BODY TEMPERATURE: ABNORMAL DEGREES
BUN SERPL-MCNC: 71 MG/DL (ref 8–22)
CA-I SERPL-SCNC: 1.1 MMOL/L (ref 1.1–1.3)
CALCIUM SERPL-MCNC: 8.3 MG/DL (ref 8.5–10.5)
CHLORIDE SERPL-SCNC: 101 MMOL/L (ref 96–112)
CO2 BLDA-SCNC: 37 MMOL/L (ref 20–33)
CO2 SERPL-SCNC: 32 MMOL/L (ref 20–33)
CREAT SERPL-MCNC: 3.5 MG/DL (ref 0.5–1.4)
CRP SERPL HS-MCNC: 16.8 MG/DL (ref 0–0.75)
DELSYS IDSYS: ABNORMAL
END TIDAL CARBON DIOXIDE IECO2: 40 MMHG
EOSINOPHIL # BLD AUTO: 0 K/UL (ref 0–0.51)
EOSINOPHIL NFR BLD: 0 % (ref 0–6.9)
ERYTHROCYTE [DISTWIDTH] IN BLOOD BY AUTOMATED COUNT: 70.8 FL (ref 35.9–50)
GLOBULIN SER CALC-MCNC: 2.4 G/DL (ref 1.9–3.5)
GLUCOSE BLD-MCNC: 165 MG/DL (ref 65–99)
GLUCOSE BLD-MCNC: 175 MG/DL (ref 65–99)
GLUCOSE BLD-MCNC: 175 MG/DL (ref 65–99)
GLUCOSE BLD-MCNC: 178 MG/DL (ref 65–99)
GLUCOSE BLD-MCNC: 180 MG/DL (ref 65–99)
GLUCOSE BLD-MCNC: 202 MG/DL (ref 65–99)
GLUCOSE SERPL-MCNC: 191 MG/DL (ref 65–99)
HCO3 BLDA-SCNC: 35.8 MMOL/L (ref 17–25)
HCT VFR BLD AUTO: 31.3 % (ref 37–47)
HGB BLD-MCNC: 10.3 G/DL (ref 12–16)
HOROWITZ INDEX BLDA+IHG-RTO: 162 MM[HG]
INR PPP: 1.17 (ref 0.87–1.13)
LIPASE SERPL-CCNC: 25 U/L (ref 11–82)
LYMPHOCYTES # BLD AUTO: 0.57 K/UL (ref 1–4.8)
LYMPHOCYTES NFR BLD: 5.1 % (ref 22–41)
MAGNESIUM SERPL-MCNC: 2.4 MG/DL (ref 1.5–2.5)
MANUAL DIFF BLD: NORMAL
MCH RBC QN AUTO: 27.2 PG (ref 27–33)
MCHC RBC AUTO-ENTMCNC: 32.9 G/DL (ref 33.6–35)
MCV RBC AUTO: 82.8 FL (ref 81.4–97.8)
MODE IMODE: ABNORMAL
MONOCYTES # BLD AUTO: 0.19 K/UL (ref 0–0.85)
MONOCYTES NFR BLD AUTO: 1.7 % (ref 0–13.4)
MORPHOLOGY BLD-IMP: NORMAL
NEUTROPHILS # BLD AUTO: 10.44 K/UL (ref 2–7.15)
NEUTROPHILS NFR BLD: 91.5 % (ref 44–72)
NEUTS BAND NFR BLD MANUAL: 1.7 % (ref 0–10)
NRBC # BLD AUTO: 0.04 K/UL
NRBC BLD-RTO: 0.4 /100 WBC
O2/TOTAL GAS SETTING VFR VENT: 60 %
PCO2 BLDA: 44.2 MMHG (ref 26–37)
PCO2 TEMP ADJ BLDA: 45 MMHG (ref 26–37)
PEEP END EXPIRATORY PRESSURE IPEEP: 12 CMH20
PERCENT MINUTE VOLUME IPMV: 120
PH BLDA: 7.52 [PH] (ref 7.4–7.5)
PH TEMP ADJ BLDA: 7.51 [PH] (ref 7.4–7.5)
PHOSPHATE SERPL-MCNC: 7 MG/DL (ref 2.5–4.5)
PLATELET # BLD AUTO: 34 K/UL (ref 164–446)
PLATELET BLD QL SMEAR: NORMAL
PLATELETS.RETICULATED NFR BLD AUTO: 20.3 K/UL (ref 0.6–13.1)
PO2 BLDA: 97 MMHG (ref 64–87)
PO2 TEMP ADJ BLDA: 100 MMHG (ref 64–87)
POTASSIUM SERPL-SCNC: 3.6 MMOL/L (ref 3.6–5.5)
PREALB SERPL-MCNC: 7.7 MG/DL (ref 18–38)
PREALB SERPL-MCNC: 9.4 MG/DL (ref 18–38)
PROT SERPL-MCNC: 4.9 G/DL (ref 6–8.2)
PROTHROMBIN TIME: 14.6 SEC (ref 12–14.6)
RBC # BLD AUTO: 3.78 M/UL (ref 4.2–5.4)
SAO2 % BLDA: 98 % (ref 93–99)
SIGNIFICANT IND 70042: NORMAL
SITE SITE: NORMAL
SODIUM SERPL-SCNC: 147 MMOL/L (ref 135–145)
SOURCE SOURCE: NORMAL
SPECIMEN DRAWN FROM PATIENT: ABNORMAL
VANCOMYCIN SERPL-MCNC: 23.3 UG/ML
WBC # BLD AUTO: 11.2 K/UL (ref 4.8–10.8)

## 2021-12-06 PROCEDURE — 83735 ASSAY OF MAGNESIUM: CPT

## 2021-12-06 PROCEDURE — 83690 ASSAY OF LIPASE: CPT

## 2021-12-06 PROCEDURE — 80053 COMPREHEN METABOLIC PANEL: CPT

## 2021-12-06 PROCEDURE — 94003 VENT MGMT INPAT SUBQ DAY: CPT

## 2021-12-06 PROCEDURE — 85055 RETICULATED PLATELET ASSAY: CPT

## 2021-12-06 PROCEDURE — 770022 HCHG ROOM/CARE - ICU (200)

## 2021-12-06 PROCEDURE — 82330 ASSAY OF CALCIUM: CPT

## 2021-12-06 PROCEDURE — 84134 ASSAY OF PREALBUMIN: CPT

## 2021-12-06 PROCEDURE — 94799 UNLISTED PULMONARY SVC/PX: CPT

## 2021-12-06 PROCEDURE — 82803 BLOOD GASES ANY COMBINATION: CPT

## 2021-12-06 PROCEDURE — A9270 NON-COVERED ITEM OR SERVICE: HCPCS | Performed by: INTERNAL MEDICINE

## 2021-12-06 PROCEDURE — 700101 HCHG RX REV CODE 250: Performed by: INTERNAL MEDICINE

## 2021-12-06 PROCEDURE — 36600 WITHDRAWAL OF ARTERIAL BLOOD: CPT

## 2021-12-06 PROCEDURE — C1880 VENA CAVA FILTER: HCPCS

## 2021-12-06 PROCEDURE — 85007 BL SMEAR W/DIFF WBC COUNT: CPT

## 2021-12-06 PROCEDURE — 71045 X-RAY EXAM CHEST 1 VIEW: CPT

## 2021-12-06 PROCEDURE — 99233 SBSQ HOSP IP/OBS HIGH 50: CPT | Performed by: INTERNAL MEDICINE

## 2021-12-06 PROCEDURE — 85610 PROTHROMBIN TIME: CPT

## 2021-12-06 PROCEDURE — 700102 HCHG RX REV CODE 250 W/ 637 OVERRIDE(OP): Performed by: INTERNAL MEDICINE

## 2021-12-06 PROCEDURE — 99291 CRITICAL CARE FIRST HOUR: CPT | Performed by: INTERNAL MEDICINE

## 2021-12-06 PROCEDURE — 700105 HCHG RX REV CODE 258: Performed by: INTERNAL MEDICINE

## 2021-12-06 PROCEDURE — 85027 COMPLETE CBC AUTOMATED: CPT

## 2021-12-06 PROCEDURE — 84100 ASSAY OF PHOSPHORUS: CPT

## 2021-12-06 PROCEDURE — 86140 C-REACTIVE PROTEIN: CPT

## 2021-12-06 PROCEDURE — B5191ZZ FLUOROSCOPY OF INFERIOR VENA CAVA USING LOW OSMOLAR CONTRAST: ICD-10-PCS | Performed by: STUDENT IN AN ORGANIZED HEALTH CARE EDUCATION/TRAINING PROGRAM

## 2021-12-06 PROCEDURE — 99232 SBSQ HOSP IP/OBS MODERATE 35: CPT | Performed by: SURGERY

## 2021-12-06 PROCEDURE — 700111 HCHG RX REV CODE 636 W/ 250 OVERRIDE (IP): Performed by: INTERNAL MEDICINE

## 2021-12-06 PROCEDURE — 82962 GLUCOSE BLOOD TEST: CPT | Mod: 91

## 2021-12-06 PROCEDURE — 80202 ASSAY OF VANCOMYCIN: CPT

## 2021-12-06 PROCEDURE — 06H03DZ INSERTION OF INTRALUMINAL DEVICE INTO INFERIOR VENA CAVA, PERCUTANEOUS APPROACH: ICD-10-PCS | Performed by: STUDENT IN AN ORGANIZED HEALTH CARE EDUCATION/TRAINING PROGRAM

## 2021-12-06 PROCEDURE — 99292 CRITICAL CARE ADDL 30 MIN: CPT | Performed by: INTERNAL MEDICINE

## 2021-12-06 RX ORDER — METOPROLOL TARTRATE 1 MG/ML
5 INJECTION, SOLUTION INTRAVENOUS ONCE
Status: COMPLETED | OUTPATIENT
Start: 2021-12-06 | End: 2021-12-06

## 2021-12-06 RX ORDER — DIPHENHYDRAMINE HYDROCHLORIDE 50 MG/ML
50 INJECTION INTRAMUSCULAR; INTRAVENOUS ONCE
Status: DISCONTINUED | OUTPATIENT
Start: 2021-12-06 | End: 2021-12-07

## 2021-12-06 RX ORDER — POTASSIUM CHLORIDE 29.8 MG/ML
40 INJECTION INTRAVENOUS ONCE
Status: COMPLETED | OUTPATIENT
Start: 2021-12-06 | End: 2021-12-06

## 2021-12-06 RX ORDER — LABETALOL HYDROCHLORIDE 5 MG/ML
INJECTION, SOLUTION INTRAVENOUS
Status: DISPENSED
Start: 2021-12-06 | End: 2021-12-07

## 2021-12-06 RX ORDER — METOPROLOL TARTRATE 1 MG/ML
5 INJECTION, SOLUTION INTRAVENOUS
Status: COMPLETED | OUTPATIENT
Start: 2021-12-06 | End: 2021-12-06

## 2021-12-06 RX ADMIN — PROPOFOL 25 MCG/KG/MIN: 10 INJECTION, EMULSION INTRAVENOUS at 14:37

## 2021-12-06 RX ADMIN — HYDRALAZINE HYDROCHLORIDE 20 MG: 20 INJECTION INTRAMUSCULAR; INTRAVENOUS at 02:44

## 2021-12-06 RX ADMIN — CHLOROTHIAZIDE SODIUM 1000 MG: 500 INJECTION, POWDER, LYOPHILIZED, FOR SOLUTION INTRAVENOUS at 08:10

## 2021-12-06 RX ADMIN — FLUDROCORTISONE ACETATE 0.1 MG: 0.1 TABLET ORAL at 17:48

## 2021-12-06 RX ADMIN — OMEPRAZOLE 40 MG: KIT at 05:08

## 2021-12-06 RX ADMIN — Medication 1 MG/HR: at 09:13

## 2021-12-06 RX ADMIN — POTASSIUM CHLORIDE 40 MEQ: 29.8 INJECTION, SOLUTION INTRAVENOUS at 08:12

## 2021-12-06 RX ADMIN — DOCUSATE SODIUM 50 MG AND SENNOSIDES 8.6 MG 2 TABLET: 8.6; 5 TABLET, FILM COATED ORAL at 05:08

## 2021-12-06 RX ADMIN — LIOTHYRONINE SODIUM 25 MCG: 25 TABLET ORAL at 05:07

## 2021-12-06 RX ADMIN — HYDROCORTISONE SODIUM SUCCINATE 50 MG: 100 INJECTION, POWDER, FOR SOLUTION INTRAMUSCULAR; INTRAVENOUS at 21:19

## 2021-12-06 RX ADMIN — PROPOFOL 25 MCG/KG/MIN: 10 INJECTION, EMULSION INTRAVENOUS at 21:45

## 2021-12-06 RX ADMIN — METOPROLOL TARTRATE 5 MG: 5 INJECTION, SOLUTION INTRAVENOUS at 22:04

## 2021-12-06 RX ADMIN — HYDROCORTISONE SODIUM SUCCINATE 200 MG: 100 INJECTION, POWDER, FOR SOLUTION INTRAMUSCULAR; INTRAVENOUS at 14:37

## 2021-12-06 RX ADMIN — HYDROCORTISONE SODIUM SUCCINATE 150 MG: 100 INJECTION, POWDER, FOR SOLUTION INTRAMUSCULAR; INTRAVENOUS at 08:10

## 2021-12-06 RX ADMIN — FLUDROCORTISONE ACETATE 0.1 MG: 0.1 TABLET ORAL at 05:08

## 2021-12-06 RX ADMIN — LABETALOL HYDROCHLORIDE 10 MG: 5 INJECTION, SOLUTION INTRAVENOUS at 08:28

## 2021-12-06 RX ADMIN — METOPROLOL TARTRATE 5 MG: 5 INJECTION, SOLUTION INTRAVENOUS at 16:35

## 2021-12-06 RX ADMIN — LEVOTHYROXINE SODIUM 75 MCG: 0.07 TABLET ORAL at 05:08

## 2021-12-06 RX ADMIN — Medication 1 MG: at 21:19

## 2021-12-06 RX ADMIN — HYDROCORTISONE SODIUM SUCCINATE 50 MG: 100 INJECTION, POWDER, FOR SOLUTION INTRAMUSCULAR; INTRAVENOUS at 05:08

## 2021-12-06 RX ADMIN — CEFTRIAXONE SODIUM 2 G: 2 INJECTION, POWDER, FOR SOLUTION INTRAMUSCULAR; INTRAVENOUS at 05:08

## 2021-12-06 RX ADMIN — OMEPRAZOLE 40 MG: KIT at 17:48

## 2021-12-06 RX ADMIN — PROPOFOL 15 MCG/KG/MIN: 10 INJECTION, EMULSION INTRAVENOUS at 08:11

## 2021-12-06 ASSESSMENT — PAIN DESCRIPTION - PAIN TYPE
TYPE: ACUTE PAIN

## 2021-12-06 NOTE — ASSESSMENT & PLAN NOTE
Rate controlled, SR currently  Hold coreg due to low BP  Optimize potassium and magnesium  [id number] score high, but hold AC due to hypotension and concern for GIB.

## 2021-12-06 NOTE — CARE PLAN
Problem: Nutritional:  Goal: Nutrition support tolerated and meeting greater than 85% of estimated needs  Outcome: Met   Novasource Renal @ 30 mL/hr (final goal rate).  Not adjusting for Propofol @ this time - will continue to monitor trends.

## 2021-12-06 NOTE — PROGRESS NOTES
"        DATE: 12/6/2021    HD 6: Hemoperitoneum following arrest    Interval Events:  Hemoglobin stable  Denies any pain on exam.     PHYSICAL EXAMINATION:  Constitutional:     Vital Signs: BP (!) 168/78   Pulse 96   Temp 36.2 °C (97.2 °F)   Resp (!) 21   Ht 1.626 m (5' 4\")   Wt 106 kg (234 lb 2.1 oz)   SpO2 95%   GENERAL:  No acute distress  HEENT: Pupils equal, round and reactive to light bilaterally.  Sclera are clear bilaterally.  No otorrhea.  No rhinorrhea.  Mucus membranes are moist.  CHEST:  Lungs are clear to auscultation bilaterally  CARDIOVASCULAR:  Regular rate and rhythm  ABDOMEN: Soft, non-tender, non-distended  EXTREMITIES:  Good range of motion through out  SKIN:  Warm and well perfused, no rashes    Laboratory Values:   Recent Labs     12/04/21 2254 12/05/21 0626 12/06/21 0515   WBC 14.1* 12.0* 11.2*   RBC 3.68* 3.69* 3.78*   HEMOGLOBIN 10.0* 10.1* 10.3*   HEMATOCRIT 29.9* 29.6* 31.3*   MCV 81.3* 80.2* 82.8   MCH 27.2 27.4 27.2   MCHC 33.4* 34.1 32.9*   RDW 68.7* 67.7* 70.8*   PLATELETCT See Note 55* 34*     Recent Labs     12/04/21 0225 12/05/21 0626 12/06/21 0515   SODIUM 141 143 147*   POTASSIUM 5.2 4.5 3.6   CHLORIDE 102 101 101   CO2 22 29 32   GLUCOSE 235* 241* 191*   BUN 50* 56* 71*   CREATININE 3.25* 3.21* 3.50*   CALCIUM 7.1* 7.6* 8.3*     Recent Labs     12/04/21 0225 12/05/21 0626 12/06/21 0515   ASTSGOT 167* 84* 73*   ALTSGPT 101* 74* 69*   TBILIRUBIN 1.2 0.7 0.6   ALKPHOSPHAT 159* 148* 181*   GLOBULIN 2.3 2.3 2.4   INR 1.32* 1.19* 1.17*     Recent Labs     12/04/21 0225 12/05/21 0626 12/06/21  0515   INR 1.32* 1.19* 1.17*         ASSESSMENT AND PLAN:   1) Hemoperitoneum:    Likely secondary to blunt liver injury, perhaps from chest compressions received during CPR, but this is not for certain.  We will managed this like solid abdominal organ injury seen in trauma.      Hemoglobin stable.  Going for IVC filter today.    Prophylactic dvt anticoagulation at discretion of " primary.   Will drop to standby at this point with stable hemoglobin, please call if we can be of help.     Aggregated care time spent evaluating, reassessing, reviewing documentation, providing care, and managing this patient exclusive of procedures: 35 minutes       ____________________________________     Dhara Unger M.D.

## 2021-12-06 NOTE — PROGRESS NOTES
Nephrology Daily Progress Note    Date of Service  12/6/2021    Chief Complaint  56 y.o. female with a history of hypertension, hemochromatosis, fibromyalgia admitted 12/1/2021 with pancreatitis and pulmonary embolism, hospital course complicated by cardiac arrest    Interval Problem Update  12/6-urine output 1.2 L in the last 24 hours.  Patient remains intubated and sedated, unable to obtain review of systems.    Review of Systems  Review of Systems   Unable to perform ROS: Intubated        Physical Exam  Pulse:  [66-98] 77  Resp:  [17-27] 24  BP: (155-161)/(82-86) 161/82  SpO2:  [93 %-100 %] 96 %    Physical Exam  Constitutional:       General: She is not in acute distress.     Appearance: She is well-developed. She is ill-appearing.      Interventions: She is sedated and intubated.   HENT:      Mouth/Throat:      Pharynx: Oropharynx is clear. No oropharyngeal exudate.   Eyes:      General: No scleral icterus.        Right eye: No discharge.         Left eye: No discharge.   Neck:      Trachea: No tracheal deviation.   Cardiovascular:      Heart sounds: Normal heart sounds. No murmur heard.      Pulmonary:      Effort: Pulmonary effort is normal. She is intubated.      Breath sounds: No stridor. No rales.   Abdominal:      Palpations: Abdomen is soft.      Tenderness: There is no abdominal tenderness.   Musculoskeletal:         General: No deformity.      Right lower leg: Edema present.      Left lower leg: Edema present.   Skin:     General: Skin is warm and dry.   Neurological:      Comments: Unable to obtain due to intubation and sedation   Psychiatric:      Comments: Unable to obtain due to intubation and sedation     Dialysis access: Right IJ temp Vas-Cath      Fluids    Intake/Output Summary (Last 24 hours) at 12/6/2021 1459  Last data filed at 12/6/2021 1230  Gross per 24 hour   Intake 776.56 ml   Output 1450 ml   Net -673.44 ml       Laboratory  Labs reviewed, pertinent labs below.  Recent Labs      12/04/21 2254 12/05/21 0626 12/06/21  0515   WBC 14.1* 12.0* 11.2*   RBC 3.68* 3.69* 3.78*   HEMOGLOBIN 10.0* 10.1* 10.3*   HEMATOCRIT 29.9* 29.6* 31.3*   MCV 81.3* 80.2* 82.8   MCH 27.2 27.4 27.2   MCHC 33.4* 34.1 32.9*   RDW 68.7* 67.7* 70.8*   PLATELETCT See Note 55* 34*     Recent Labs     12/04/21 0225 12/05/21 0626 12/06/21  0515   SODIUM 141 143 147*   POTASSIUM 5.2 4.5 3.6   CHLORIDE 102 101 101   CO2 22 29 32   GLUCOSE 235* 241* 191*   BUN 50* 56* 71*   CREATININE 3.25* 3.21* 3.50*   CALCIUM 7.1* 7.6* 8.3*     Recent Labs     12/04/21 0225 12/05/21 0626 12/06/21  0515   INR 1.32* 1.19* 1.17*           URINALYSIS:  Lab Results   Component Value Date/Time    COLORURINE DK Yellow 12/01/2021 1832    CLARITY Cloudy (A) 12/01/2021 1832    SPECGRAVITY 1.030 12/01/2021 1832    PHURINE 5.0 12/01/2021 1832    KETONES Negative 12/01/2021 1832    PROTEINURIN 30 (A) 12/01/2021 1832    BILIRUBINUR Negative 12/01/2021 1832    UROBILU 0.2 12/01/2021 1832    NITRITE Negative 12/01/2021 1832    LEUKESTERAS Trace (A) 12/01/2021 1832    OCCULTBLOOD Trace (A) 12/01/2021 1832     UPC  No results found for: TOTPROTUR   Lab Results   Component Value Date/Time    CREATININEU Not Applicable 10/25/2021 1145    CREATININEU 95.52 10/25/2021 1145         Imaging interpreted by radiologist. Imaging reports reviewed with pertinent findings below  DX-CHEST-PORTABLE (1 VIEW)   Final Result         1.  Pulmonary edema and/or infiltrates are identified, which are stable since the prior exam.   2.  Cardiomegaly      DX-CHEST-PORTABLE (1 VIEW)   Final Result      Tubes and lines as described above.      Bibasilar atelectasis with small pleural effusions.      DX-CHEST-PORTABLE (1 VIEW)   Final Result         1. No significant interval change.      [id number] VIEW   Final Result      Cortrak feeding tube tip projects in the region of the mid stomach.      DX-CHEST-PORTABLE (1 VIEW)   Final Result      Placement of right IJ dialysis  catheter.      Otherwise stable findings.      DX-CHEST-PORTABLE (1 VIEW)   Final Result         1.  Pulmonary edema and/or infiltrates are identified, which are stable since the prior exam.   2.  Cardiomegaly      DX-CHEST-PORTABLE (1 VIEW)   Final Result         1.  Pulmonary edema and/or infiltrates are identified, which are stable since the prior exam.   2.  Cardiomegaly      DX-CHEST-FOR LINE PLACEMENT Perform procedure in: Patient's Room   Final Result      Interval placement of a left IJ central venous catheter with the tip overlying the right atrium.      US-EXTREMITY VENOUS LOWER BILAT   Final Result      CTA ABDOMEN PELVIS W & W/O POST PROCESS   Final Result      1.  Small to moderate amount of hemoperitoneum in the abdomen and pelvis. No active extravasation is identified.   2.  No aortic aneurysm or dissection.   3.  Peripancreatic stranding can be related to pancreatitis.   4.  Cardiomegaly.   5.  Hepatic steatosis.   6.  Status post cholecystectomy.   7.  Atrophic kidneys bilaterally.   8.  Status post gastric bypass surgery.   9.  Mild wall thickening of the second portion of the duodenum with surrounding inflammation. This can be seen in duodenitis/peptic ulcer disease.   10.  Colonic diverticulosis.   11.  Bibasilar atelectasis/consolidation. Groundglass opacities with septal thickening at the lung bases can be seen in the setting of edema or multifocal pneumonia.   12.  Bilateral anterior rib fractures.      Findings discussed with Dr. Rush.      DX-CHEST-PORTABLE (1 VIEW)   Final Result      1.  Endotracheal tube present.      2.  Cardiomegaly with interstitial prominence.      EC-ECHOCARDIOGRAM COMPLETE W/ CONT   Final Result      CT-CTA CHEST PULMONARY ARTERY W/ RECONS   Final Result      1.  Positive for pulmonary embolism located in multiple segmental and subsegmental branches      2.  Minimal elevation of RV/LV ratio      3.  Mild atelectasis      4.  Left PICC line present and appears  appropriately located      5.  Findings were discussed with BLAINE VEGA on 12/1/2021 5:35 PM.                  CT-ABDOMEN-PELVIS WITH   Final Result      1.  Apparent inflammation of the pancreas with surrounding fat stranding and fluid suggesting pancreatitis. Recommend correlation with lipase.      2.  Diverticulosis without diverticulitis.      3.  Hepatic steatosis.      US-EXTREMITY VENOUS UPPER UNILAT LEFT   Final Result      DX-CHEST-PORTABLE (1 VIEW)   Final Result      1.  No acute cardiopulmonary abnormality identified.      2.  Left PICC line appears appropriately located      IR-INSERT IVC FILTER WITH IG & SI    (Results Pending)         Current Facility-Administered Medications   Medication Dose Route Frequency Provider Last Rate Last Admin   • diphenhydrAMINE (BENADRYL) injection 50 mg  50 mg Intravenous Once Eligio Del Castillo M.D.       • [START ON 12/7/2021] vancomycin (VANCOCIN) 1,500 mg in  mL IVPB  15 mg/kg Intravenous Once Eligio Del Castillo M.D.       • hydrocortisone sodium succinate PF (Solu-CORTEF) 100 MG injection 50 mg  50 mg Intravenous Q8HRS Eligio Del Castillo M.D.       • propofol (DIPRIVAN) injection  0-80 mcg/kg/min Intravenous Continuous Eligio Del Castillo M.D. 15.3 mL/hr at 12/06/21 1437 25 mcg/kg/min at 12/06/21 1437   • hydrALAZINE (APRESOLINE) injection 20 mg  20 mg Intravenous Q4HRS PRN Eligio Del Castillo M.D.   20 mg at 12/06/21 0244   • insulin regular (HumuLIN R,NovoLIN R) injection  3-14 Units Subcutaneous Q6HRS Eligio Del Castillo M.D.   3 Units at 12/06/21 0518    And   • dextrose 50% (D50W) injection 50 mL  50 mL Intravenous Q15 MIN PRN Eligio Del Castillo M.D.       • liothyronine (CYTOMEL) tablet 25 mcg  25 mcg Enteral Tube QAM AC Eligio Del Castillo M.D.   25 mcg at 12/06/21 0507   • levothyroxine (SYNTHROID) tablet 75 mcg  75 mcg Enteral Tube AM ES Eligio Del Castillo M.D.   75 mcg at 12/06/21 0508   • omeprazole  (FIRST-OMEPRAZOLE) 2 mg/mL oral susp 40 mg  40 mg Enteral Tube Q12HRS Eligio Del Castillo M.D.   40 mg at 12/06/21 0508   • fludrocortisone (FLORINEF) tablet 0.1 mg  0.1 mg Enteral Tube BID Eligio Del Castillo M.D.   0.1 mg at 12/06/21 0508   • Pharmacy Consult: Enteral tube insertion - review meds/change route/product selection  1 Each Other PHARMACY TO DOSE Eligio Del Castillo M.D.       • senna-docusate (PERICOLACE or SENOKOT S) 8.6-50 MG per tablet 2 Tablet  2 Tablet Enteral Tube BID Eligio Del Castillo M.D.   2 Tablet at 12/06/21 0508    And   • polyethylene glycol/lytes (MIRALAX) PACKET 1 Packet  1 Packet Enteral Tube QDAY PRN Eligio Del Castillo M.D.        And   • magnesium hydroxide (MILK OF MAGNESIA) suspension 30 mL  30 mL Enteral Tube QDAY PRN Eligio Del Castillo M.D.        And   • bisacodyl (DULCOLAX) suppository 10 mg  10 mg Rectal QDAY PRN Eligio Del Castillo M.D.       • ondansetron (ZOFRAN ODT) dispertab 4 mg  4 mg Enteral Tube Q4HRS PRN Eligio Del Castillo M.D.       • labetalol (NORMODYNE/TRANDATE) injection 10-20 mg  10-20 mg Intravenous Q4HRS PRN Eligio Del Castillo M.D.   10 mg at 12/06/21 0828   • acetaminophen (TYLENOL) suppository 650 mg  650 mg Rectal Q4HRS PRN Martha Mirza M.D.   650 mg at 12/03/21 0250   • MD Alert...Vancomycin per Pharmacy   Other PHARMACY TO DOSE Eligio Del Castillo M.D.       • cefTRIAXone (Rocephin) 2 g in  mL IVPB  2 g Intravenous Q24HRS Eligio Del Castillo M.D.   Stopped at 12/06/21 0538   • Respiratory Therapy Consult   Nebulization Continuous RT Servando Rush M.D.       • MD Alert...ICU Electrolyte Replacement per Pharmacy   Other PHARMACY TO DOSE Servando Rush M.D.       • lidocaine (XYLOCAINE) 1 % injection 2 mL  2 mL Tracheal Tube Q30 MIN PRN Servando Rush M.D.       • HYDROmorphone (DILAUDID) 0.2 mg/mL in 50mL NS (Continuous Infusion)   Intravenous Continuous Eligio Del Castillo M.D. 5 mL/hr at  12/06/21 0913 1 mg/hr at 12/06/21 0913   • HYDROmorphone (Dilaudid) injection 0.5-2 mg  0.5-2 mg Intravenous Q HOUR PRN Eligio Del Castillo M.D.   2 mg at 12/05/21 1552   • Pharmacy Consult Request ...Pain Management Review 1 Each  1 Each Other PHARMACY TO DOSE David Yoder M.D.       • ondansetron (ZOFRAN) syringe/vial injection 4 mg  4 mg Intravenous Q4HRS PRN David Yoder M.D.       • promethazine (PHENERGAN) suppository 12.5-25 mg  12.5-25 mg Rectal Q4HRS PRN David Yoder M.D.             Assessment/Plan  56 y.o. female with a history of hypertension, hemochromatosis, fibromyalgia, obesity status post gastric bypass admitted 12/1/2021 with pancreatitis and pulmonary embolism, hospital course complicated by cardiac arrest    1.  Acute kidney injury, nonoliguric, creatinine worsening.  MARY LOU likely from ATN or contrast nephropathy from contrast scans on 12/1/2021 and 12/2/2021.  There is no acute need for dialysis, as the patient is nonoliguric.  Avoid nephrotoxins.  Check labs daily.    2.  Azotemia, worsening, unable to assess if patient has uremic symptoms due to intubation and sedation.  Check labs daily.    3.  Hypernatremia, worsening.  Recommend free water flushes per enteral tube.  Check labs daily.    4.  Acute hypoxic ventilator dependent respiratory failure.  Stable.  Ventilator management deferred to critical care team.  If patient becomes more hypoxic, consider trial of high-dose Lasix 80 mg IV twice daily.  If high-dose Lasix fails, patient might require dialysis.    5.  Normocytic anemia, persistent.  Unclear etiology.  Check CBC daily.    6.  Thrombocytopenia, worsening.  Unclear etiology.  Check CBC daily.    7.  Leukocytosis, persistent.  Unclear etiology.  Check CBC daily.    8.  DVT/PE, noted on this admission, on 12/1/2021 CT PE scan.  Planning for IVC filter on 12/6/2021.  Defer further management to primary team.      Fernando Fuentes MD  Nephrology

## 2021-12-06 NOTE — PROGRESS NOTES
Critical Care Progress Note    Date of admission  12/1/2021    Chief Complaint  56 y.o. female admitted 12/1/2021 with acute pancreatitis, pulmonary embolism.  She has a history of prior gastrointestinal bleeding from unknown source, MRSA sinusitis on outpatient antibiotics, Hitchcock's disease, GERD, primary hypertension, hemochromatosis, hypothyroidism, traumatic brain injury, myocardial infarction and fibromyalgia.    Hospital Course      12/2 -    vent day 1.  Trend hemoglobin and platelet count and transfuse blood products as required.  Full vent support.  Inhaled Flolan for RV dysfunction following cardiac arrest.  12/3 -    vent day 2.  Start bicarbonate drip for MARY LOU.  Titrating norepinephrine.  Wean off Flolan.  12/4 -    vent day 3.  Renal function worse.  Titrating norepinephrine.  12/5 -    vent day 4.  Blood pressure increased.  Change dexmedetomidine to propofol.  12/6 -    vent day 5.  Arrange for IVC filter placement.  Nephrology on board.      Interval Problem Update  Reviewed last 24 hour events:      PAF  SR with PACs  UOP OK  TF 30  + BM  99.3  -211 mL of the last 24  +5970 mL since admit  Force diuresis with chlorothiazide  Replete potassium  Propofol 15  Dilaudid 1  Vasopressin off  Norepinephrine off  Taper hydrocortisone      Review of Systems  Review of Systems   Unable to perform ROS: Acuity of condition        Vital Signs for last 24 hours   Pulse:  [46-97] 96  Resp:  [21-25] 21  BP: (168-187)/() 168/78  SpO2:  [82 %-97 %] 95 %    Hemodynamic parameters for last 24 hours       Respiratory Information for the last 24 hours  Vent Mode: ASV  PEEP/CPAP: 12  MAP: 16  Control VTE (exp VT): 266    Physical Exam   Physical Exam  Constitutional:       Appearance: She is not diaphoretic.      Comments: On ventilator   HENT:      Head: Normocephalic.      Nose: Nose normal.      Mouth/Throat:      Mouth: Mucous membranes are moist.      Pharynx: Oropharynx is clear.   Eyes:       Conjunctiva/sclera: Conjunctivae normal.      Pupils: Pupils are equal, round, and reactive to light.   Cardiovascular:      Pulses: Normal pulses.      Comments: Sinus rhythm  Pulmonary:      Breath sounds: Rales (Scattered bilateral crackles) present. No wheezing.   Abdominal:      General: There is distension.      Tenderness: There is abdominal tenderness.      Comments: Tolerating enteral tube feedings   Musculoskeletal:      Cervical back: Normal range of motion.      Right lower leg: Edema present.      Left lower leg: Edema present.      Comments: No clubbing or cyanosis   Skin:     General: Skin is warm.   Neurological:      Comments: Sedated, arouses and nods.         Medications  Current Facility-Administered Medications   Medication Dose Route Frequency Provider Last Rate Last Admin   • chlorothiazide (DIURIL) 1,000 mg in NS 50 mL IVPB  1,000 mg Intravenous Once Eligio Del Castillo M.D.       • potassium chloride in water (KCL) ivpb (ICU ONLY) 40 mEq  40 mEq Intravenous Once Eligio Del Castillo M.D.       • diphenhydrAMINE (BENADRYL) injection 50 mg  50 mg Intravenous Once Eligio Del Castillo M.D.       • hydrocortisone sodium succinate PF (Solu-CORTEF) 100 MG injection 200 mg  200 mg Intravenous Q8HRS Eligio Del Castillo M.D.       • hydrocortisone sodium succinate PF (Solu-CORTEF) 100 MG injection 150 mg  150 mg Intravenous Once Eligio Del Castillo M.D.       • propofol (DIPRIVAN) injection  0-80 mcg/kg/min Intravenous Continuous Eligio Del Castillo M.D. 9.2 mL/hr at 12/06/21 0420 15 mcg/kg/min at 12/06/21 0420   • hydrALAZINE (APRESOLINE) injection 20 mg  20 mg Intravenous Q4HRS PRN Eligio Del Castillo M.D.   20 mg at 12/06/21 0244   • insulin regular (HumuLIN R,NovoLIN R) injection  3-14 Units Subcutaneous Q6HRS Eligio Del Castillo M.D.   3 Units at 12/06/21 0518    And   • dextrose 50% (D50W) injection 50 mL  50 mL Intravenous Q15 MIN PRN Eligio Del Castillo M.D.       •  liothyronine (CYTOMEL) tablet 25 mcg  25 mcg Enteral Tube QAM AC Eligio Del Castillo M.D.   25 mcg at 12/06/21 0507   • levothyroxine (SYNTHROID) tablet 75 mcg  75 mcg Enteral Tube AM ES Eligio Del Castillo M.D.   75 mcg at 12/06/21 0508   • omeprazole (FIRST-OMEPRAZOLE) 2 mg/mL oral susp 40 mg  40 mg Enteral Tube Q12HRS Eligio Del Castillo M.D.   40 mg at 12/06/21 0508   • fludrocortisone (FLORINEF) tablet 0.1 mg  0.1 mg Enteral Tube BID Eligio Del Castillo M.D.   0.1 mg at 12/06/21 0508   • Pharmacy Consult: Enteral tube insertion - review meds/change route/product selection  1 Each Other PHARMACY TO DOSE Eligio Del Castillo M.D.       • senna-docusate (PERICOLACE or SENOKOT S) 8.6-50 MG per tablet 2 Tablet  2 Tablet Enteral Tube BID Eligio Del Castillo M.D.   2 Tablet at 12/06/21 0508    And   • polyethylene glycol/lytes (MIRALAX) PACKET 1 Packet  1 Packet Enteral Tube QDAY PRN Eligio Del Castillo M.D.        And   • magnesium hydroxide (MILK OF MAGNESIA) suspension 30 mL  30 mL Enteral Tube QDAY PRN Eligio Del Castillo M.D.        And   • bisacodyl (DULCOLAX) suppository 10 mg  10 mg Rectal QDAY PRN Eligio Del Castillo M.D.       • ondansetron (ZOFRAN ODT) dispertab 4 mg  4 mg Enteral Tube Q4HRS PRN Eligio Del Castillo M.D.       • labetalol (NORMODYNE/TRANDATE) injection 10-20 mg  10-20 mg Intravenous Q4HRS PRN Eligio Del Castillo M.D.   20 mg at 12/05/21 0720   • acetaminophen (TYLENOL) suppository 650 mg  650 mg Rectal Q4HRS PRN Martha Mirza M.D.   650 mg at 12/03/21 0250   • MD Alert...Vancomycin per Pharmacy   Other PHARMACY TO DOSE Eligio Del Castillo M.D.       • cefTRIAXone (Rocephin) 2 g in  mL IVPB  2 g Intravenous Q24HRS Eligio Del Castillo M.D.   Stopped at 12/06/21 0538   • Respiratory Therapy Consult   Nebulization Continuous RT Servando Rush M.D.       • MD Alert...ICU Electrolyte Replacement per Pharmacy   Other PHARMACY TO DOSE Servando TRIPP  ERNESTO Rush       • lidocaine (XYLOCAINE) 1 % injection 2 mL  2 mL Tracheal Tube Q30 MIN PRN Servando Rush M.D.       • HYDROmorphone (DILAUDID) 0.2 mg/mL in 50mL NS (Continuous Infusion)   Intravenous Continuous Eligio Del Castillo M.D. 5 mL/hr at 12/05/21 2100 1 mg/hr at 12/05/21 2100   • HYDROmorphone (Dilaudid) injection 0.5-2 mg  0.5-2 mg Intravenous Q HOUR PRN Eligio Del Castillo M.D.   2 mg at 12/05/21 1552   • Pharmacy Consult Request ...Pain Management Review 1 Each  1 Each Other PHARMACY TO DOSE David Yoder M.D.       • ondansetron (ZOFRAN) syringe/vial injection 4 mg  4 mg Intravenous Q4HRS PRN David Yoder M.D.       • promethazine (PHENERGAN) suppository 12.5-25 mg  12.5-25 mg Rectal Q4HRS PRN David Yoder M.D.           Fluids    Intake/Output Summary (Last 24 hours) at 12/6/2021 0726  Last data filed at 12/6/2021 0600  Gross per 24 hour   Intake 983.87 ml   Output 1195 ml   Net -211.13 ml       Laboratory  Recent Labs     12/04/21 0229 12/05/21  0118 12/06/21  0114   ISTATAPH 7.512* 7.579* 7.516*   ISTATAPCO2 32.5 33.2 44.2*   ISTATAPO2 91* 56* 97*   ISTATATCO2 27 32 37*   ABMUYPC5SIX 98 93 98   ISTATARTHCO3 26.1* 31.0* 35.8*   ISTATARTBE 3 9* 12*   ISTATTEMP 98.0 F 36.2 C 37.4 C   ISTATFIO2 50 40 60   ISTATSPEC Arterial Arterial Arterial   ISTATAPHTC 7.517* 7.591* 7.510*   YMNKHPMM6OC 89* 53* 100*         Recent Labs     12/04/21 0225 12/05/21  0626 12/06/21  0515   SODIUM 141 143 147*   POTASSIUM 5.2 4.5 3.6   CHLORIDE 102 101 101   CO2 22 29 32   BUN 50* 56* 71*   CREATININE 3.25* 3.21* 3.50*   MAGNESIUM 2.6* 2.5 2.4   PHOSPHORUS 7.0* 6.7* 7.0*   CALCIUM 7.1* 7.6* 8.3*     Recent Labs     12/04/21 0225 12/05/21 0626 12/06/21  0515   ALTSGPT 101* 74* 69*   ASTSGOT 167* 84* 73*   ALKPHOSPHAT 159* 148* 181*   TBILIRUBIN 1.2 0.7 0.6   LIPASE 219* 55 25   GLUCOSE 235* 241* 191*     Recent Labs     12/04/21 0225 12/04/21 0625 12/04/21  2254 12/05/21 0626 12/06/21  0515   WBC  16.9*   < > 14.1* 12.0* 11.2*   NEUTSPOLYS 80.20*  --  81.40* 94.90*  --    LYMPHOCYTES 3.40*  --  3.50* 0.80*  --    MONOCYTES 6.00  --  5.30 4.30  --    EOSINOPHILS 0.00  --  0.00 0.00  --    BASOPHILS 0.00  --  0.00 0.00  --    ASTSGOT 167*  --   --  84* 73*   ALTSGPT 101*  --   --  74* 69*   ALKPHOSPHAT 159*  --   --  148* 181*   TBILIRUBIN 1.2  --   --  0.7 0.6    < > = values in this interval not displayed.     Recent Labs     12/04/21  0225 12/04/21  0625 12/04/21  2254 12/05/21  0626 12/06/21  0515   RBC 4.20   < > 3.68* 3.69* 3.78*   HEMOGLOBIN 11.3*   < > 10.0* 10.1* 10.3*   HEMATOCRIT 32.7*   < > 29.9* 29.6* 31.3*   PLATELETCT 32*   < > See Note 55* 34*   PROTHROMBTM 16.0*  --   --  14.7* 14.6   INR 1.32*  --   --  1.19* 1.17*    < > = values in this interval not displayed.       Imaging  X-Ray:  I have personally reviewed the images and compared with prior images. and My impression is: Improved right lower lobe opacities    Assessment/Plan  * Cardiac arrest (HCC)  Assessment & Plan  PEA, asystole and ventricular tachycardia in ED - precipitated by hemorrhagic shock  ROSC after 7 rounds of CPR, IV epinephrine, IV bicarbonate solution and massive transfusion protocol  Echo with RV dysfunction and RVSP of 50 mmHg    Gastrointestinal hemorrhage  Assessment & Plan  History of gastrointestinal hemorrhage with no source identified  Suspect gastrointestinal hemorrhage provoked by IV heparin for pulmonary embolism - no gross blood noted in stool - positive for occult blood  PPI twice daily  Trend hemoglobin and transfuse PRBCs to keep hemoglobin greater than 7    Shock ([facility])- (present on admission)  Assessment & Plan  Hemorrhagic shock with acute gastrointestinal blood loss and vasodistributive shock  Shock has resolved    Acute respiratory failure with hypoxia ([facility])- (present on admission)  Assessment & Plan  Intubated 12/2  All of the appropriate ventilator bundles are in place  SAT/SBT as appropriate  Mobility  level 1-2    Paroxysmal atrial fibrillation (HCC)  Assessment & Plan  Check thyroid function  Optimize potassium and magnesium    Hemoperitoneum  Assessment & Plan  Surgery on board  No indication for surgery at this time  Trend hemoglobin and transfuse PRBCs to keep hemoglobin greater than 7    DVT (deep venous thrombosis) (HCC)  Assessment & Plan  Imaging reveals LUE DVT associated with her PICC line and a distal LLE DVT  Anticoagulation strictly contraindicated due to acute gastrointestinal hemorrhage and hemoperitoneum  IVC filter placement    Acute pancreatitis  Assessment & Plan  Lipase is now normal  She is having bowel movements  She is tolerating enteral tube feedings    PE (pulmonary thromboembolism) (HCC)- (present on admission)  Assessment & Plan  Anticoagulation is contraindicated  IVC filter placement    Acute blood loss anemia- (present on admission)  Assessment & Plan  Suspect gastrointestinal source of blood loss as well as acute hemoperitoneum  Trend hemoglobin and transfuse PRBCs to keep hemoglobin greater than 7    Thrombocytopenia (HCC)- (present on admission)  Assessment & Plan  Trend platelet count and thromboelastogram with platelet mapping  Transfuse platelets as required    Adrenal insufficiency (Valentín's disease) (HCC)- (present on admission)  Assessment & Plan  Increase hydrocortisone, 200 mg IV every 8 hours in anticipation of need for IV contrast for IVC filter placement    Elevated LFTs- (present on admission)  Assessment & Plan  Due to ischemic hepatopathy from cardiac arrest and shock  Trend enzymes and synthetic function  Avoid hepatotoxins    Acute kidney injury (HCC)- (present on admission)  Assessment & Plan  Suspect ATN due to shock in lady with underlying atrophic kidneys  Monitor renal function and urine output  Avoid nephrotoxins and renal dose medications  Nephrology on board    Primary hypertension- (present on admission)  Assessment & Plan  Goal SBP less than  160  Hydralazine and labetalol as necessary to achieve blood pressure goals    Hemochromatosis- (present on admission)  Assessment & Plan  History of    MRSA and E. coli sinusitis  Assessment & Plan  On outpatient IV antibiotics - LUE PICC in place  Continue vancomycin and ceftriaxone    Hypothyroidism- (present on admission)  Assessment & Plan  Continue levothyroxine    Obesity (BMI 35.0-39.9 without comorbidity)- (present on admission)  Assessment & Plan          VTE:  Contraindicated  Ulcer: PPI  Lines: Central Line  Ongoing indication addressed, Arterial Line  Ongoing indication addressed and Perez Catheter  Ongoing indication addressed    I have performed a physical exam and reviewed and updated ROS and Plan today (12/6/2021). In review of yesterday's note (12/5/2021), there are no changes except as documented above.     I have assessed and reassessed her respiratory status with ventilator adjustments, ventilator waveforms, airway mechanics, blood pressure, hemodynamics, cardiovascular status with titration of norepinephrine, urine output and her neurologic status.  She is at increased risk for worsening respiratory, cardiovascular, renal and gastrointestinal system dysfunction.    Discussed patient condition and risk of morbidity and/or mortality with RN, RT, Pharmacy, Charge nurse / hot rounds and QA team     The patient remains critically ill.  Critical care time = 90 minutes in directly providing and coordinating critical care and extensive data review.  No time overlap and excludes procedures.    Eligio Del Castillo MD  Pulmonary and Critical Care Medicine

## 2021-12-06 NOTE — CARE PLAN
The patient is Watcher - Medium risk of patient condition declining or worsening    Shift Goals  Clinical Goals: Hemodynamic stability  Patient Goals: DONNA  Family Goals: DONNA    Progress made toward(s) clinical / shift goals:  Pain controlled via continuous drip medications; titrate as appropriate/ordered. Patient remains free from falls. Medical restraints in use; safety maintained; no signs of skin breakdown.    Patient is not progressing towards the following goals:

## 2021-12-06 NOTE — CONSULTS
DATE OF SERVICE:  12/05/2021     NEPHROLOGY CONSULTATION     REQUESTING PHYSICIAN:  Eligio Del Castillo MD     REASON FOR CONSULTATION:  Evaluate patient with acute kidney injury.     HISTORY OF PRESENT ILLNESS:  The patient is a 56-year-old female with multiple   medical problems, baseline creatinine level 0.7, 0.9, admitted to the   hospital on 12/01/2021 with complaints of not feeling well, nausea, abdominal   pain.  She has been having history of gastrointestinal bleeding, which is   scheduled by Gastroenterology for colonoscopy on CAT scan, which was pending.   Upon evaluation, found to have elevated lipase and acute pancreatitis, also   found with multiple deep venous thrombosis and pulmonary emboli.  Hospital   course complicated with severe bleeding, drop in hemoglobin level, cardiac   arrest, acute kidney injury. Currently, the patient in the intensive care   unit, intubated on mechanical ventilation, doing better off pressors,   nonoliguric.  Creatinine level went up from her baseline to 3.25 now slightly   better to 3.21, acidosis corrected.  Electrolytes well controlled.     REVIEW OF SYSTEMS:  Not possible to obtain, as the patient is intubated on   mechanical ventilation, sedated.     PAST MEDICAL HISTORY:  Positive for Ambrose disease, MRSA infection, migraine,   myocardial infarction, osteoarthritis, osteoporosis, pneumonia, seizures,   traumatic brain injury, fibromyalgia, gastrointestinal bleeding,   gastroesophageal reflux disease, hemochromatosis, hypertension,   hypothyroidism.     PAST SURGICAL HISTORY:  Hysterectomy, abdominal exploration, cyst excision,   mandible fracture repair, shoulder surgery.     FAMILY HISTORY:  Positive for heart attack in multiple family members,   diabetes mellitus type 2.     SOCIAL HISTORY:  No tobacco, alcohol or drug use.     ALLERGIES:  ALLERGIC TO ANCEF, BACTRIM, BEE VENOM, BUPRENORPHINE, CLINDAMYCIN,   CONTRAST MEDIA, DOXYCYCLINE, ECONAZOLE, FLAGYL,  FLOXIN, GADOLINIUM,   HYDROCODONE, KEFLEX, LEVOFLOXACIN, MORPHINE, NALOXONE, NITROFURANTOIN, NORCO,   NYQUIL, OXYCODONE, PENICILLIN, TAPE, SULFA DRUGS.     OUTPATIENT MEDICATIONS:  Reviewed.     PHYSICAL EXAMINATION:  VITAL SIGNS:  Blood pressure 168/78, heart rate 54, temperature 35.8 Celsius.  GENERAL:  Well-developed, well-nourished female, intubated on mechanical   ventilation, morbidly obese, in no acute distress.  HEENT:  Normocephalic, atraumatic.  Pupils equal, round, reactive to light.    Endotracheal tube in place.  NECK:  No lymphadenopathy, no thyromegaly appreciated.  LUNGS:  Coarse breath sounds bilaterally.  Decreased breath sounds at bases.  HEART:  Regular rhythm, no rub or gallop.  ABDOMEN:  Soft.  No palpable mass.  Bowel sounds present.  EXTREMITIES:  1+ pedal edema bilaterally.  No cyanosis.     LABORATORY DATA:  Reviewed, revealed hemoglobin level 10.1, platelets 55.    Sodium 143, potassium 4.5, BUN 56, creatinine 3.21.     URINALYSIS:  Trace protein, RBCs 0-2.     ASSESSMENT AND PLAN:  The patient is a 56-year-old female with multiple   medical problems admitted to the hospital with acute pancreatitis, pulmonary   emboli, deep venous thrombosis, suffered from severe gastrointestinal bleeding   complicated by cardiac arrest, now developed acute kidney injury.  1.  Acute kidney injury secondary to acute tubular necrosis from renal   hypoperfusion, slight improvement, nonoliguric.  Continue to monitor closely.  2.  Electrolytes remain well controlled.  3.  Metabolic acidosis, corrected.  Well controlled now. Bicarbonate drip   stopped.  4.  Hypotension, improved, off pressors.  To monitor.  5.  Anemia.  Hemoglobin level remains stable.  6.  Thrombocytopenia.  Platelet level improving.  7.  Ventilatory-dependent respiratory failure per pulmonary critical team.   8.  Volume, not significantly overloaded.     RECOMMENDATIONS:  1.  Continue to monitor renal function closely.  No need for emergent    hemodialysis at present time.  To monitor daily hemoglobin level, platelets,   basic metabolic panel.  2.  Adjust all medications to renal doses.  3.  Avoid nephrotoxin.     Above plan was discussed with Dr. Eligio Del Castillo.        ______________________________  MD KESHAWN AVILES/PRINCESS    DD:  12/05/2021 15:34  DT:  12/05/2021 16:37    Job#:  [id number]

## 2021-12-06 NOTE — PROGRESS NOTES
IR RN note    Patient underwent a Inferior Vena Cava Filter Placement by Dr. Moffett.  Procedure site was verified by MD using imaging guidance. Consent was signed by .  IR yayo Soni and Mavis assisted. Patient was placed in a supine position.  Vitals were taken every 5 minutes. Patient is vented and sedated at this time.  Right IJ access site.   A gauze and tegaderm dressing was placed over surgical site. Report called to Charis ROBERTSON. Pt transported by flores with RN to T612, with monitor.     [employer] Option Elite Retrievable Vena Cava Filter  REF # [id number]  LOT # [id number]  Exp. 11/16/24

## 2021-12-06 NOTE — PROGRESS NOTES
Pharmacy Vancomycin Kinetics Note for 12/6/2021     56 y.o. female on Vancomycin day # 5     Vancomycin Indication (Two level/Trough based Dosing): Skin/skin structure infection (goal trough 10-15)    Provider specified end date: 12/21/21    Active Antibiotics (From admission, onward)    Ordered     Ordering Provider       Mon Dec 6, 2021 12:37 PM    12/06/21 1237  vancomycin (VANCOCIN) 1,500 mg in  mL IVPB  (vancomycin (VANCOCIN) IV (LD + Maintenance))  ONCE        Note to Pharmacy: Per P&T Kinetics Protocol    Eligio Del Castillo M.D.       Fri Dec 3, 2021  4:25 PM    12/03/21 1625  cefTRIAXone (Rocephin) 2 g in  mL IVPB  EVERY 24 HOURS         Eligio Del Castillo M.D.       Fri Dec 3, 2021  9:08 AM    12/03/21 0908  MD Alert...Vancomycin per Pharmacy  PHARMACY TO DOSE        Question:  Indication(s) for vancomycin?  Answer:  Other (comments)    Eligio Del Csatillo M.D.          Dosing Weight: 106 kg (233 lb 11 oz)      Admission History: Admitted on 12/1/2021 for PE (pulmonary thromboembolism) ([facility]) [I26.99]  Shock ([facility]) [R57.9]  Pertinent history: Patient with history of sinusitis followed by [facility] and has been on multiple courses of antibiotics.  Most recent culture with E coli and MRSA.  Vancomycin and ceftriaxone initiated.    Allergies:     Ancef [cefazolin], Bactrim [sulfamethoxazole w-trimethoprim], Bee venom, Buprenorphine, Clindamycin, Contrast media with iodine [iodine], Doxycycline, Econazole, Flagyl  [metronidazole], Floxin [ofloxacin], Gadolinium derivatives, Hydrocodone-acetaminophen, Iodine, Keflex, Levofloxacin, Morphine, Naloxone, Nitrofurantoin, Norco [apap-fd&c yellow #10 [place]-hydrocodone], Nyquil, Oxycodone, Paricalcitol, Penicillins, Tape, Tramadol, Vicks dayquil cold, Azithromycin, Bextra [valdecoxib], Linezolid, Adhesive remover [skin adhesives], Codeine, Sulfa drugs, and Tygacil [tigecycline]     Pertinent cultures to date:     Results     Procedure Component Value  "Units Date/Time    BLOOD CULTURE [[id number]] Collected: 12/01/21 1345    Order Status: Completed Specimen: Blood from Peripheral Updated: 12/02/21 0744     Significant Indicator NEG     Source BLD     Site PERIPHERAL     Culture Result No Growth  Note: Blood cultures are incubated for 5 days and  are monitored continuously.Positive blood cultures  are called to the RN and reported as soon as  they are identified.      Narrative:      1 of 2 for Blood Culture x 2 sites order. Per Hospital  Policy: Only change Specimen Src: to \"Line\" if specified by  physician order.  No site indicated    BLOOD CULTURE [[id number]] Collected: 12/01/21 2320    Order Status: Completed Specimen: Blood from Peripheral Updated: 12/02/21 0744     Significant Indicator NEG     Source BLD     Site PERIPHERAL     Culture Result No Growth  Note: Blood cultures are incubated for 5 days and  are monitored continuously.Positive blood cultures  are called to the RN and reported as soon as  they are identified.      Narrative:      2 of 2 blood culture x2  Sites order. Per Hospital Policy:  Only change Specimen Src: to \"Line\" if specified by physician  order.  Right AC    URINALYSIS CULTURE, IF INDICATED [[id number]]  (Abnormal) Collected: 12/01/21 1832    Order Status: Completed Specimen: Urine Updated: 12/01/21 1918     Color DK Yellow     Character Cloudy     Specific Gravity 1.030     Ph 5.0     Glucose Negative mg/dL      Ketones Negative mg/dL      Protein 30 mg/dL      Bilirubin Negative     Urobilinogen, Urine 0.2     Nitrite Negative     Leukocyte Esterase Trace     Occult Blood Trace     Micro Urine Req Microscopic    Narrative:      Indication for culture:->Patient WITHOUT an indwelling Perez  catheter in place with new onset of Dysuria, Frequency,  Urgency, and/or Suprapubic pain    URINALYSIS [[id number]]     Order Status: Canceled Specimen: Urine           Labs:     Estimated Creatinine Clearance: 21.1 mL/min (A) (by C-G formula based on " "SCr of 3.5 mg/dL (H)).  Recent Labs     12/04/21 0225 12/04/21  0225 12/04/21  0625 12/04/21  1400 12/04/21  2254 12/05/21  0626 12/06/21  0515   WBC 16.9*   < > 15.7* 12.3* 14.1* 12.0* 11.2*   NEUTSPOLYS 80.20*  --   --   --  81.40* 94.90* 91.50*   BANDSSTABS 7.80  --   --   --  7.10  --  1.70    < > = values in this interval not displayed.     Recent Labs     12/04/21 0225 12/05/21 0626 12/06/21 0515   BUN 50* 56* 71*   CREATININE 3.25* 3.21* 3.50*   ALBUMIN 2.3* 2.4* 2.5*       Intake/Output Summary (Last 24 hours) at 12/6/2021 1303  Last data filed at 12/6/2021 1230  Gross per 24 hour   Intake 776.56 ml   Output 1525 ml   Net -748.44 ml      Blood Pressure (Abnormal) 161/82   Pulse 77   Temperature 36.2 °C (97.2 °F)   Respiration (Abnormal) 24   Height 1.626 m (5' 4\")   Weight 106 kg (234 lb 2.1 oz)   Oxygen Saturation 96%  No data recorded.      List concerns for Vancomycin clearance:     BUN/Scr ratio greater than 20:1;Obesity    Pharmacokinetics:     Trough kinetics:   Recent Labs     12/06/21  0845   VANCORANDOM 23.3       A/P:     -  Vancomycin dose: 1500 mg IV x 1 tomorrow morning     -  Next vancomycin level(s):    -12/8 Vr @ 0500    -  Comments: Supra-therapeutic random level drawn ~48 hours from last dose of vancomycin.  Nephrology now consulted for MARY LOU.  Give 15 mg/kg pulse dose tomorrow morning with repeat level as above or sooner if renal recovery.  Will continue to follow.     Dannielle Soares, PharmD, BCPS, BCCCP    "

## 2021-12-06 NOTE — PROGRESS NOTES
Patient taken to IR with RT and IR RN/Tech. Reviewed drips and line status.  Benadryl NOT given d/t IR notifying RN of patient not to receive contrast, so no need for Benadryl (contrast allergy).

## 2021-12-06 NOTE — PROGRESS NOTES
Prop off, dilaudid rate change from 2 to 1mg/hr. Pt awake, following commands, calm. Hypertensive in the 180s. Hydralazine given and bp responded well. Systolic in the 120s, HR 98.

## 2021-12-07 ENCOUNTER — APPOINTMENT (OUTPATIENT)
Dept: RADIOLOGY | Facility: MEDICAL CENTER | Age: 56
DRG: 981 | End: 2021-12-07
Attending: INTERNAL MEDICINE
Payer: MEDICARE

## 2021-12-07 PROBLEM — E87.6 HYPOKALEMIA: Status: ACTIVE | Noted: 2021-12-07

## 2021-12-07 LAB
ALBUMIN SERPL BCP-MCNC: 2.7 G/DL (ref 3.2–4.9)
ALBUMIN/GLOB SERPL: 1.2 G/DL
ALP SERPL-CCNC: 196 U/L (ref 30–99)
ALT SERPL-CCNC: 69 U/L (ref 2–50)
ANION GAP SERPL CALC-SCNC: 15 MMOL/L (ref 7–16)
ANISOCYTOSIS BLD QL SMEAR: ABNORMAL
AST SERPL-CCNC: 74 U/L (ref 12–45)
BACTERIA BLD CULT: NORMAL
BASE EXCESS BLDA CALC-SCNC: 12 MMOL/L (ref -4–3)
BASOPHILS # BLD AUTO: 0 % (ref 0–1.8)
BASOPHILS # BLD: 0 K/UL (ref 0–0.12)
BILIRUB SERPL-MCNC: 0.7 MG/DL (ref 0.1–1.5)
BODY TEMPERATURE: ABNORMAL DEGREES
BUN SERPL-MCNC: 56 MG/DL (ref 8–22)
CA-I SERPL-SCNC: 1 MMOL/L (ref 1.1–1.3)
CALCIUM SERPL-MCNC: 8.3 MG/DL (ref 8.5–10.5)
CHLORIDE SERPL-SCNC: 102 MMOL/L (ref 96–112)
CO2 BLDA-SCNC: 36 MMOL/L (ref 20–33)
CO2 SERPL-SCNC: 31 MMOL/L (ref 20–33)
CREAT SERPL-MCNC: 2.8 MG/DL (ref 0.5–1.4)
DELSYS IDSYS: ABNORMAL
END TIDAL CARBON DIOXIDE IECO2: 34 MMHG
EOSINOPHIL # BLD AUTO: 0 K/UL (ref 0–0.51)
EOSINOPHIL NFR BLD: 0 % (ref 0–6.9)
ERYTHROCYTE [DISTWIDTH] IN BLOOD BY AUTOMATED COUNT: 72.4 FL (ref 35.9–50)
GLOBULIN SER CALC-MCNC: 2.2 G/DL (ref 1.9–3.5)
GLUCOSE BLD-MCNC: 205 MG/DL (ref 65–99)
GLUCOSE BLD-MCNC: 211 MG/DL (ref 65–99)
GLUCOSE BLD-MCNC: 214 MG/DL (ref 65–99)
GLUCOSE SERPL-MCNC: 236 MG/DL (ref 65–99)
HCO3 BLDA-SCNC: 34.7 MMOL/L (ref 17–25)
HCT VFR BLD AUTO: 33.6 % (ref 37–47)
HGB BLD-MCNC: 10.7 G/DL (ref 12–16)
HOROWITZ INDEX BLDA+IHG-RTO: 107 MM[HG]
LIPASE SERPL-CCNC: 16 U/L (ref 11–82)
LYMPHOCYTES # BLD AUTO: 0.25 K/UL (ref 1–4.8)
LYMPHOCYTES NFR BLD: 2.7 % (ref 22–41)
MACROCYTES BLD QL SMEAR: ABNORMAL
MAGNESIUM SERPL-MCNC: 2.3 MG/DL (ref 1.5–2.5)
MANUAL DIFF BLD: NORMAL
MCH RBC QN AUTO: 26.8 PG (ref 27–33)
MCHC RBC AUTO-ENTMCNC: 31.8 G/DL (ref 33.6–35)
MCV RBC AUTO: 84 FL (ref 81.4–97.8)
MICROCYTES BLD QL SMEAR: ABNORMAL
MODE IMODE: ABNORMAL
MONOCYTES # BLD AUTO: 0.56 K/UL (ref 0–0.85)
MONOCYTES NFR BLD AUTO: 6.2 % (ref 0–13.4)
MORPHOLOGY BLD-IMP: NORMAL
NEUTROPHILS # BLD AUTO: 8.29 K/UL (ref 2–7.15)
NEUTROPHILS NFR BLD: 91.1 % (ref 44–72)
NRBC # BLD AUTO: 0.03 K/UL
NRBC BLD-RTO: 0.3 /100 WBC
O2/TOTAL GAS SETTING VFR VENT: 70 %
OVALOCYTES BLD QL SMEAR: NORMAL
PCO2 BLDA: 38 MMHG (ref 26–37)
PCO2 TEMP ADJ BLDA: 37 MMHG (ref 26–37)
PEEP END EXPIRATORY PRESSURE IPEEP: 10 CMH20
PERCENT MINUTE VOLUME IPMV: 120
PH BLDA: 7.57 [PH] (ref 7.4–7.5)
PH TEMP ADJ BLDA: 7.58 [PH] (ref 7.4–7.5)
PHOSPHATE SERPL-MCNC: 6.6 MG/DL (ref 2.5–4.5)
PLATELET # BLD AUTO: 82 K/UL (ref 164–446)
PLATELET BLD QL SMEAR: NORMAL
PO2 BLDA: 75 MMHG (ref 64–87)
PO2 TEMP ADJ BLDA: 72 MMHG (ref 64–87)
POIKILOCYTOSIS BLD QL SMEAR: NORMAL
POTASSIUM SERPL-SCNC: 2.7 MMOL/L (ref 3.6–5.5)
POTASSIUM SERPL-SCNC: 2.8 MMOL/L (ref 3.6–5.5)
POTASSIUM SERPL-SCNC: 2.8 MMOL/L (ref 3.6–5.5)
PROT SERPL-MCNC: 4.9 G/DL (ref 6–8.2)
RBC # BLD AUTO: 4 M/UL (ref 4.2–5.4)
RBC BLD AUTO: PRESENT
SAO2 % BLDA: 97 % (ref 93–99)
SIGNIFICANT IND 70042: NORMAL
SITE SITE: NORMAL
SODIUM SERPL-SCNC: 148 MMOL/L (ref 135–145)
SOURCE SOURCE: NORMAL
SPECIMEN DRAWN FROM PATIENT: ABNORMAL
T4 FREE SERPL-MCNC: 0.32 NG/DL (ref 0.93–1.7)
TRIGL SERPL-MCNC: 178 MG/DL (ref 0–149)
TSH SERPL DL<=0.005 MIU/L-ACNC: 0.17 UIU/ML (ref 0.38–5.33)
WBC # BLD AUTO: 9.1 K/UL (ref 4.8–10.8)

## 2021-12-07 PROCEDURE — 82962 GLUCOSE BLOOD TEST: CPT | Mod: 91

## 2021-12-07 PROCEDURE — 83735 ASSAY OF MAGNESIUM: CPT

## 2021-12-07 PROCEDURE — A9270 NON-COVERED ITEM OR SERVICE: HCPCS | Performed by: INTERNAL MEDICINE

## 2021-12-07 PROCEDURE — 94799 UNLISTED PULMONARY SVC/PX: CPT

## 2021-12-07 PROCEDURE — 85027 COMPLETE CBC AUTOMATED: CPT

## 2021-12-07 PROCEDURE — 83690 ASSAY OF LIPASE: CPT

## 2021-12-07 PROCEDURE — 71045 X-RAY EXAM CHEST 1 VIEW: CPT

## 2021-12-07 PROCEDURE — 99291 CRITICAL CARE FIRST HOUR: CPT | Performed by: INTERNAL MEDICINE

## 2021-12-07 PROCEDURE — 85007 BL SMEAR W/DIFF WBC COUNT: CPT

## 2021-12-07 PROCEDURE — 94003 VENT MGMT INPAT SUBQ DAY: CPT

## 2021-12-07 PROCEDURE — 84100 ASSAY OF PHOSPHORUS: CPT

## 2021-12-07 PROCEDURE — 82330 ASSAY OF CALCIUM: CPT

## 2021-12-07 PROCEDURE — 84439 ASSAY OF FREE THYROXINE: CPT

## 2021-12-07 PROCEDURE — 84478 ASSAY OF TRIGLYCERIDES: CPT

## 2021-12-07 PROCEDURE — 84443 ASSAY THYROID STIM HORMONE: CPT

## 2021-12-07 PROCEDURE — 700105 HCHG RX REV CODE 258: Performed by: INTERNAL MEDICINE

## 2021-12-07 PROCEDURE — 770022 HCHG ROOM/CARE - ICU (200)

## 2021-12-07 PROCEDURE — 700102 HCHG RX REV CODE 250 W/ 637 OVERRIDE(OP): Performed by: INTERNAL MEDICINE

## 2021-12-07 PROCEDURE — 80053 COMPREHEN METABOLIC PANEL: CPT

## 2021-12-07 PROCEDURE — 700111 HCHG RX REV CODE 636 W/ 250 OVERRIDE (IP): Performed by: INTERNAL MEDICINE

## 2021-12-07 PROCEDURE — 84132 ASSAY OF SERUM POTASSIUM: CPT

## 2021-12-07 PROCEDURE — 99292 CRITICAL CARE ADDL 30 MIN: CPT | Performed by: INTERNAL MEDICINE

## 2021-12-07 PROCEDURE — 82803 BLOOD GASES ANY COMBINATION: CPT

## 2021-12-07 PROCEDURE — 99233 SBSQ HOSP IP/OBS HIGH 50: CPT | Performed by: INTERNAL MEDICINE

## 2021-12-07 PROCEDURE — 700111 HCHG RX REV CODE 636 W/ 250 OVERRIDE (IP): Performed by: STUDENT IN AN ORGANIZED HEALTH CARE EDUCATION/TRAINING PROGRAM

## 2021-12-07 PROCEDURE — 37799 UNLISTED PX VASCULAR SURGERY: CPT

## 2021-12-07 RX ORDER — CARVEDILOL 25 MG/1
25 TABLET ORAL 2 TIMES DAILY WITH MEALS
Status: DISCONTINUED | OUTPATIENT
Start: 2021-12-07 | End: 2021-12-07

## 2021-12-07 RX ORDER — POTASSIUM CHLORIDE 7.45 MG/ML
10 INJECTION INTRAVENOUS ONCE
Status: COMPLETED | OUTPATIENT
Start: 2021-12-07 | End: 2021-12-07

## 2021-12-07 RX ORDER — POTASSIUM CHLORIDE 14.9 MG/ML
20 INJECTION INTRAVENOUS ONCE
Status: COMPLETED | OUTPATIENT
Start: 2021-12-07 | End: 2021-12-07

## 2021-12-07 RX ORDER — CARVEDILOL 25 MG/1
25 TABLET ORAL 2 TIMES DAILY
Status: DISCONTINUED | OUTPATIENT
Start: 2021-12-07 | End: 2021-12-14

## 2021-12-07 RX ORDER — AMILORIDE HYDROCHLORIDE 5 MG/1
5 TABLET ORAL
Status: DISCONTINUED | OUTPATIENT
Start: 2021-12-07 | End: 2021-12-14

## 2021-12-07 RX ORDER — CALCIUM GLUCONATE 20 MG/ML
2 INJECTION, SOLUTION INTRAVENOUS ONCE
Status: COMPLETED | OUTPATIENT
Start: 2021-12-07 | End: 2021-12-07

## 2021-12-07 RX ORDER — DOXAZOSIN 2 MG/1
2 TABLET ORAL DAILY
Status: DISCONTINUED | OUTPATIENT
Start: 2021-12-07 | End: 2021-12-14

## 2021-12-07 RX ADMIN — CHLOROTHIAZIDE SODIUM 1000 MG: 500 INJECTION, POWDER, LYOPHILIZED, FOR SOLUTION INTRAVENOUS at 08:18

## 2021-12-07 RX ADMIN — AMILORIDE HYDROCLORIDE 5 MG: 5 TABLET ORAL at 17:47

## 2021-12-07 RX ADMIN — HYDROCORTISONE SODIUM SUCCINATE 50 MG: 100 INJECTION, POWDER, FOR SOLUTION INTRAMUSCULAR; INTRAVENOUS at 06:13

## 2021-12-07 RX ADMIN — HYDRALAZINE HYDROCHLORIDE 20 MG: 20 INJECTION INTRAMUSCULAR; INTRAVENOUS at 21:34

## 2021-12-07 RX ADMIN — PROPOFOL 30 MCG/KG/MIN: 10 INJECTION, EMULSION INTRAVENOUS at 04:35

## 2021-12-07 RX ADMIN — PROPOFOL 25 MCG/KG/MIN: 10 INJECTION, EMULSION INTRAVENOUS at 14:57

## 2021-12-07 RX ADMIN — PROPOFOL 30 MCG/KG/MIN: 10 INJECTION, EMULSION INTRAVENOUS at 09:52

## 2021-12-07 RX ADMIN — PROPOFOL 25 MCG/KG/MIN: 10 INJECTION, EMULSION INTRAVENOUS at 21:53

## 2021-12-07 RX ADMIN — POTASSIUM CHLORIDE 20 MEQ: 14.9 INJECTION, SOLUTION INTRAVENOUS at 12:37

## 2021-12-07 RX ADMIN — CARVEDILOL 25 MG: 25 TABLET, FILM COATED ORAL at 17:44

## 2021-12-07 RX ADMIN — POTASSIUM CHLORIDE 20 MEQ: 14.9 INJECTION, SOLUTION INTRAVENOUS at 06:10

## 2021-12-07 RX ADMIN — OMEPRAZOLE 40 MG: KIT at 06:14

## 2021-12-07 RX ADMIN — Medication 1 MG/HR: at 21:54

## 2021-12-07 RX ADMIN — Medication 1 MG/HR: at 09:40

## 2021-12-07 RX ADMIN — LABETALOL HYDROCHLORIDE 20 MG: 5 INJECTION, SOLUTION INTRAVENOUS at 00:04

## 2021-12-07 RX ADMIN — VANCOMYCIN HYDROCHLORIDE 1500 MG: 500 INJECTION, POWDER, LYOPHILIZED, FOR SOLUTION INTRAVENOUS at 05:05

## 2021-12-07 RX ADMIN — LIOTHYRONINE SODIUM 25 MCG: 25 TABLET ORAL at 08:18

## 2021-12-07 RX ADMIN — POTASSIUM CHLORIDE 10 MEQ: 7.46 INJECTION, SOLUTION INTRAVENOUS at 14:45

## 2021-12-07 RX ADMIN — LEVOTHYROXINE SODIUM 75 MCG: 0.07 TABLET ORAL at 06:13

## 2021-12-07 RX ADMIN — DOXAZOSIN 2 MG: 2 TABLET ORAL at 17:44

## 2021-12-07 RX ADMIN — FLUDROCORTISONE ACETATE 0.1 MG: 0.1 TABLET ORAL at 06:13

## 2021-12-07 RX ADMIN — POTASSIUM CHLORIDE 20 MEQ: 14.9 INJECTION, SOLUTION INTRAVENOUS at 19:43

## 2021-12-07 RX ADMIN — FLUDROCORTISONE ACETATE 0.1 MG: 0.1 TABLET ORAL at 17:44

## 2021-12-07 RX ADMIN — CARVEDILOL 25 MG: 25 TABLET, FILM COATED ORAL at 10:22

## 2021-12-07 RX ADMIN — HYDROCORTISONE SODIUM SUCCINATE 25 MG: 100 INJECTION, POWDER, FOR SOLUTION INTRAMUSCULAR; INTRAVENOUS at 14:49

## 2021-12-07 RX ADMIN — CALCIUM GLUCONATE 2 G: 20 INJECTION, SOLUTION INTRAVENOUS at 09:39

## 2021-12-07 RX ADMIN — OMEPRAZOLE 40 MG: KIT at 17:44

## 2021-12-07 RX ADMIN — CEFTRIAXONE SODIUM 2 G: 2 INJECTION, POWDER, FOR SOLUTION INTRAMUSCULAR; INTRAVENOUS at 06:14

## 2021-12-07 RX ADMIN — HYDROCORTISONE SODIUM SUCCINATE 25 MG: 100 INJECTION, POWDER, FOR SOLUTION INTRAMUSCULAR; INTRAVENOUS at 21:34

## 2021-12-07 RX ADMIN — LABETALOL HYDROCHLORIDE 20 MG: 5 INJECTION, SOLUTION INTRAVENOUS at 06:40

## 2021-12-07 RX ADMIN — POTASSIUM CHLORIDE 10 MEQ: 7.46 INJECTION, SOLUTION INTRAVENOUS at 21:04

## 2021-12-07 RX ADMIN — POTASSIUM CHLORIDE 10 MEQ: 7.46 INJECTION, SOLUTION INTRAVENOUS at 08:14

## 2021-12-07 ASSESSMENT — PAIN DESCRIPTION - PAIN TYPE
TYPE: ACUTE PAIN

## 2021-12-07 NOTE — PALLIATIVE CARE
Palliative Care follow-up  Met with  at bedside to check in on him and how Donna was doing. Colin was stating that she was doing better and had a positive conversation with Dr. Fuentes and also was able to talk about the IVC filter that they were going to place for the clot. He was happy with her progress.       Updated: none    Plan: Continue to support pt and family in this stay    Thank you for allowing Palliative Care to support this patient and family. Contact [phone] for additional assistance, change in patient status, or with any questions/concerns.

## 2021-12-07 NOTE — PROGRESS NOTES
Critical Care Progress Note    Date of admission  12/1/2021    Chief Complaint  56 y.o. female admitted 12/1/2021 with acute pancreatitis, pulmonary embolism.  She has a history of prior gastrointestinal bleeding from unknown source, MRSA sinusitis on outpatient antibiotics, [place]'s disease, GERD, primary hypertension, hemochromatosis, hypothyroidism, traumatic brain injury, myocardial infarction and fibromyalgia.    Hospital Course      12/2 -    vent day 1.  Trend hemoglobin and platelet count and transfuse blood products as required.  Full vent support.  Inhaled Flolan for RV dysfunction following cardiac arrest.  12/3 -    vent day 2.  Start bicarbonate drip for MARY LOU.  Titrating norepinephrine.  Wean off Flolan.  12/4 -    vent day 3.  Renal function worse.  Titrating norepinephrine.  12/5 -    vent day 4.  Blood pressure increased.  Change dexmedetomidine to propofol.  12/6 -    vent day 5.  Arrange for IVC filter placement.  Nephrology on board.  12/7 -    vent day 6.  IVC filter placed yesterday.  Renal function improving.  Taper hydrocortisone.      Interval Problem Update  Reviewed last 24 hour events:      IVC filter placed  PAF  BP up  SR  Tolerating TF  99.1  -1330 mL in the last 24  +4577 mL since admit  Replete potassium  Taper hydrocortisone  Forced diuresis with chlorothiazide  Start carvedilol, 25 mg twice daily      Review of Systems  Review of Systems   Unable to perform ROS: Acuity of condition        Vital Signs for last 24 hours   Temp:  [36.4 °C (97.5 °F)] 36.4 °C (97.5 °F)  Pulse:  [3-142] 77  Resp:  [2-27] 23  BP: (118-188)/() 178/106  SpO2:  [83 %-100 %] 96 %    Hemodynamic parameters for last 24 hours       Respiratory Information for the last 24 hours  Vent Mode: ASV  Vt Target (mL): 330  PEEP/CPAP: 10  MAP: 15  Control VTE (exp VT): 281    Physical Exam   Physical Exam  Constitutional:       Appearance: She is not diaphoretic.      Comments: On ventilator   HENT:      Head:  Normocephalic.      Nose: Nose normal.      Mouth/Throat:      Mouth: Mucous membranes are moist.      Pharynx: Oropharynx is clear.   Eyes:      Conjunctiva/sclera: Conjunctivae normal.      Pupils: Pupils are equal, round, and reactive to light.   Cardiovascular:      Pulses: Normal pulses.      Comments: Sinus rhythm  Pulmonary:      Breath sounds: Rales (Coarse crackles) present. No wheezing.   Abdominal:      General: There is no distension.      Tenderness: There is no abdominal tenderness.      Comments: Tolerating enteral tube feedings   Musculoskeletal:      Cervical back: Normal range of motion.      Right lower leg: Edema present.      Left lower leg: Edema present.      Comments: No clubbing or cyanosis   Skin:     General: Skin is warm.   Neurological:      Comments: Sedated, arouses and nods.         Medications  Current Facility-Administered Medications   Medication Dose Route Frequency Provider Last Rate Last Admin   • potassium chloride in water (KCL) ivpb **Administer in ICU only** 20 mEq  20 mEq Intravenous Once Martha Mirza M.D. 50 mL/hr at 12/07/21 0610 20 mEq at 12/07/21 0610    Followed by   • potassium chloride (KCL) ivpb 10 mEq  10 mEq Intravenous Once Martha Mirza M.D.       • K+ Scale: Goal of 4.5  1 Each Intravenous Q6HRS Eligio Del Castillo M.D.       • hydrocortisone sodium succinate PF (Solu-CORTEF) 100 MG injection 25 mg  25 mg Intravenous Q8HRS Eligio Del Castillo M.D.       • chlorothiazide (DIURIL) 1,000 mg in NS 50 mL IVPB  1,000 mg Intravenous Once Eligio Del Castillo M.D.       • carvedilol (COREG) tablet 25 mg  25 mg Enteral Tube BID Eligio Del Castillo M.D.       • propofol (DIPRIVAN) injection  0-80 mcg/kg/min Intravenous Continuous Eligio Del Castillo M.D. 18.4 mL/hr at 12/07/21 0435 30 mcg/kg/min at 12/07/21 0435   • hydrALAZINE (APRESOLINE) injection 20 mg  20 mg Intravenous Q4HRS PRN Eligio Del Castillo M.D.   20 mg at 12/06/21 0244   • insulin  regular (HumuLIN R,NovoLIN R) injection  3-14 Units Subcutaneous Q6HRS Eligio Del Castillo M.D.   4 Units at 12/07/21 0652    And   • dextrose 50% (D50W) injection 50 mL  50 mL Intravenous Q15 MIN PRN Eligio Del Castillo M.D.       • liothyronine (CYTOMEL) tablet 25 mcg  25 mcg Enteral Tube QAM AC Eligio Del Castillo M.D.   25 mcg at 12/06/21 0507   • levothyroxine (SYNTHROID) tablet 75 mcg  75 mcg Enteral Tube AM ES Eligio Del Castillo M.D.   75 mcg at 12/07/21 0613   • omeprazole (FIRST-OMEPRAZOLE) 2 mg/mL oral susp 40 mg  40 mg Enteral Tube Q12HRS Eligio Del Castillo M.D.   40 mg at 12/07/21 0614   • fludrocortisone (FLORINEF) tablet 0.1 mg  0.1 mg Enteral Tube BID Eligio Del Castillo M.D.   0.1 mg at 12/07/21 0613   • Pharmacy Consult: Enteral tube insertion - review meds/change route/product selection  1 Each Other PHARMACY TO DOSE Eligio Del Castillo M.D.       • senna-docusate (PERICOLACE or SENOKOT S) 8.6-50 MG per tablet 2 Tablet  2 Tablet Enteral Tube BID Eligio Del Castillo M.D.   2 Tablet at 12/06/21 0508    And   • polyethylene glycol/lytes (MIRALAX) PACKET 1 Packet  1 Packet Enteral Tube QDAY PRN Eligio Del Castillo M.D.        And   • magnesium hydroxide (MILK OF MAGNESIA) suspension 30 mL  30 mL Enteral Tube QDAY PRN Eligio Del Castillo M.D.        And   • bisacodyl (DULCOLAX) suppository 10 mg  10 mg Rectal QDAY PRN Eligio Del Castillo M.D.       • ondansetron (ZOFRAN ODT) dispertab 4 mg  4 mg Enteral Tube Q4HRS PRN Eligio Del Castillo M.D.       • labetalol (NORMODYNE/TRANDATE) injection 10-20 mg  10-20 mg Intravenous Q4HRS PRN Eligio Del Castillo M.D.   20 mg at 12/07/21 0640   • acetaminophen (TYLENOL) suppository 650 mg  650 mg Rectal Q4HRS PRN Martha Mirza M.D.   650 mg at 12/03/21 0250   • MD Alert...Vancomycin per Pharmacy   Other PHARMACY TO DOSE Eligio Del Castillo M.D.       • cefTRIAXone (Rocephin) 2 g in  mL IVPB  2 g Intravenous  Q24HRS Eligio Del Castillo M.D. 200 mL/hr at 12/07/21 0614 2 g at 12/07/21 0614   • Respiratory Therapy Consult   Nebulization Continuous RT Servando Rush M.D.       • MD Alert...ICU Electrolyte Replacement per Pharmacy   Other PHARMACY TO DOSE Servando Rush M.D.       • lidocaine (XYLOCAINE) 1 % injection 2 mL  2 mL Tracheal Tube Q30 MIN PRN Servando Rush M.D.       • HYDROmorphone (DILAUDID) 0.2 mg/mL in 50mL NS (Continuous Infusion)   Intravenous Continuous Eligio Del Castillo M.D. 5 mL/hr at 12/06/21 0913 1 mg at 12/06/21 2119   • HYDROmorphone (Dilaudid) injection 0.5-2 mg  0.5-2 mg Intravenous Q HOUR PRN Eligio Del Castillo M.D.   2 mg at 12/05/21 1552   • Pharmacy Consult Request ...Pain Management Review 1 Each  1 Each Other PHARMACY TO DOSE David Yoder M.D.       • ondansetron (ZOFRAN) syringe/vial injection 4 mg  4 mg Intravenous Q4HRS PRN David Yoder M.D.       • promethazine (PHENERGAN) suppository 12.5-25 mg  12.5-25 mg Rectal Q4HRS PRN David Yoder M.D.           Fluids    Intake/Output Summary (Last 24 hours) at 12/7/2021 0711  Last data filed at 12/7/2021 0600  Gross per 24 hour   Intake 844.61 ml   Output 2175 ml   Net -1330.39 ml       Laboratory  Recent Labs     12/05/21  0118 12/06/21  0114 12/07/21  0126   ISTATAPH 7.579* 7.516* 7.568*   ISTATAPCO2 33.2 44.2* 38.0*   ISTATAPO2 56* 97* 75   ISTATATCO2 32 37* 36*   JUBLOMH3YCC 93 98 97   ISTATARTHCO3 31.0* 35.8* 34.7*   ISTATARTBE 9* 12* 12*   ISTATTEMP 36.2 C 37.4 C 36.4 C   ISTATFIO2 40 60 70   ISTATSPEC Arterial Arterial Arterial   ISTATAPHTC 7.591* 7.510* 7.577*   [id number] 53* 100* 72         Recent Labs     12/05/21 0626 12/06/21 0515 12/07/21  0340   SODIUM 143 147* 148*   POTASSIUM 4.5 3.6 2.7*   CHLORIDE 101 101 102   CO2 29 32 31   BUN 56* 71* 56*   CREATININE 3.21* 3.50* 2.80*   MAGNESIUM 2.5 2.4 2.3   PHOSPHORUS 6.7* 7.0* 6.6*   CALCIUM 7.6* 8.3* 8.3*     Recent Labs     12/05/21 0626 12/06/21 0515  12/06/21  0845 12/07/21  0340   ALTSGPT 74* 69*  --  69*   ASTSGOT 84* 73*  --  74*   ALKPHOSPHAT 148* 181*  --  196*   TBILIRUBIN 0.7 0.6  --  0.7   LIPASE 55 25  --  16   PREALBUMIN 7.7*  --  9.4*  --    GLUCOSE 241* 191*  --  236*     Recent Labs     12/05/21  0626 12/06/21  0515 12/07/21  0340   WBC 12.0* 11.2* 9.1   NEUTSPOLYS 94.90* 91.50* 91.10*   LYMPHOCYTES 0.80* 5.10* 2.70*   MONOCYTES 4.30 1.70 6.20   EOSINOPHILS 0.00 0.00 0.00   BASOPHILS 0.00 0.00 0.00   ASTSGOT 84* 73* 74*   ALTSGPT 74* 69* 69*   ALKPHOSPHAT 148* 181* 196*   TBILIRUBIN 0.7 0.6 0.7     Recent Labs     12/05/21  0626 12/06/21  0515 12/07/21  0340   RBC 3.69* 3.78* 4.00*   HEMOGLOBIN 10.1* 10.3* 10.7*   HEMATOCRIT 29.6* 31.3* 33.6*   PLATELETCT 55* 34* 82*   PROTHROMBTM 14.7* 14.6  --    INR 1.19* 1.17*  --        Imaging  X-Ray:  I have personally reviewed the images and compared with prior images. and My impression is: Improved edema.  Persistent left lower lobe opacification.    Assessment/Plan  * Cardiac arrest (HCC)  Assessment & Plan  PEA, asystole and ventricular tachycardia in ED - precipitated by hemorrhagic shock  ROSC after 7 rounds of CPR, IV epinephrine, IV bicarbonate solution and massive transfusion protocol  Echo with RV dysfunction and RVSP of 50 mmHg    Gastrointestinal hemorrhage  Assessment & Plan  History of gastrointestinal hemorrhage with no source identified  Suspect gastrointestinal hemorrhage provoked by IV heparin for pulmonary embolism - no gross blood noted in stool - positive for occult blood  PPI twice daily  Trend hemoglobin and transfuse PRBCs to keep hemoglobin greater than 7    Shock (HCC)- (present on admission)  Assessment & Plan  Hemorrhagic shock with acute gastrointestinal blood loss and vasodistributive shock  Shock has resolved    Acute respiratory failure with hypoxia ([facility])- (present on admission)  Assessment & Plan  Intubated 12/2  All of the appropriate ventilator bundles are in place  SAT/SBT  as appropriate  Mobility level 2    Paroxysmal atrial fibrillation (HCC)  Assessment & Plan  Resume carvedilol, 25 mg twice daily  Optimize potassium and magnesium    Hemoperitoneum  Assessment & Plan  Surgery has evaluated  No indication for surgery at this time  Trend hemoglobin and transfuse PRBCs to keep hemoglobin greater than 7    DVT (deep venous thrombosis) (HCC)  Assessment & Plan  Imaging reveals LUE DVT associated with her PICC line and a distal LLE DVT  Anticoagulation strictly contraindicated due to acute gastrointestinal hemorrhage and hemoperitoneum  IVC filter placement on 12/6    Acute pancreatitis  Assessment & Plan  Resolved  She is having bowel movements  She is tolerating enteral tube feedings    PE (pulmonary thromboembolism) (HCC)- (present on admission)  Assessment & Plan  Anticoagulation is contraindicated  IVC filter placement on 12/6    Acute blood loss anemia- (present on admission)  Assessment & Plan  Suspect gastrointestinal source of blood loss as well as acute hemoperitoneum  Trend hemoglobin and transfuse PRBCs to keep hemoglobin greater than 7    Thrombocytopenia (HCC)- (present on admission)  Assessment & Plan  Trend platelet count and thromboelastogram with platelet mapping  Transfuse platelets as required    Adrenal insufficiency (Valentín's disease) (HCC)- (present on admission)  Assessment & Plan  Taper hydrocortisone, 25 mg every 8 hours  Continue Florinef, 0.1 mg twice daily    Elevated LFTs- (present on admission)  Assessment & Plan  Due to ischemic hepatopathy from cardiac arrest and shock  Trend enzymes and synthetic function  Avoid hepatotoxins    Acute kidney injury (HCC)- (present on admission)  Assessment & Plan  Suspect ATN due to shock in lady with underlying atrophic kidneys  Monitor renal function and urine output  Avoid nephrotoxins and renal dose medications  Nephrology on board    Primary hypertension- (present on admission)  Assessment & Plan  Goal SBP less than  160  Hydralazine and labetalol as necessary to achieve blood pressure goals  Resume carvedilol, 25 mg twice daily    Hypokalemia  Assessment & Plan  Replete potassium    Hemochromatosis- (present on admission)  Assessment & Plan  History of    MRSA and E. coli sinusitis  Assessment & Plan  On outpatient IV antibiotics - LUE PICC in place  Continue vancomycin and ceftriaxone    Hypothyroidism- (present on admission)  Assessment & Plan  Continue levothyroxine    Obesity (BMI 35.0-39.9 without comorbidity)- (present on admission)  Assessment & Plan          VTE:  Contraindicated  Ulcer: PPI  Lines: Central Line  Ongoing indication addressed and Perez Catheter  Ongoing indication addressed     I have performed a physical exam and reviewed and updated ROS and Plan today (12/7/2021). In review of yesterday's note (12/6/2021), there are no changes except as documented above.     I have assessed and reassessed her respiratory status with ventilator adjustments, airway mechanics, ventilator waveforms, hemodynamics, blood pressure, cardiovascular status and her neurologic status.  She is at increased risk for worsening respiratory, cardiovascular, renal system dysfunction.    Discussed patient condition and risk of morbidity and/or mortality with RN, RT, Pharmacy, Charge nurse / hot rounds and QA team     The patient remains critically ill.  Critical care time = 98 minutes in directly providing and coordinating critical care and extensive data review.  No time overlap and excludes procedures.    Eligio Del Castillo MD  Pulmonary and Critical Care Medicine

## 2021-12-07 NOTE — PROGRESS NOTES
"This morning an order was placed for this patient to have a CT ABD/PELVIS W/WO CONTRAST. Patient is KNOWN to have an allergy to contrast, order indicates otherwise.   Also, GI (Dr Castellano) has not been consulted, Per Dr Loya.    CT on hold at this time; CT tech aware; CT to check back later.     A voicemail message has been left for Dr Castellano's office.  A text message has been sent to both Dr Castellnao and Sophia Metzger (person who entered the CT order) - both are currently \"offline\" and should receive the text at some point.  "

## 2021-12-07 NOTE — CARE PLAN
The patient is Watcher - Medium risk of patient condition declining or worsening    Shift Goals  Clinical Goals: Hemodynamic Stability/Hr rate control  Patient Goals: DONNA  Family Goals: DONNA    Progress made toward(s) clinical / shift goals:      Problem: Pain - Standard  Goal: Alleviation of pain or a reduction in pain to the patient’s comfort goal  Outcome: Progressing  Flowsheets (Taken 12/7/2021 0000)  Critical-Care Pain Observation Score: 0  Note: Pt continues to received dilaudid and propofol gtts. Gtts titrated as ordered.     Problem: Safety - Medical Restraint  Goal: Remains free of injury from restraints (Restraint for Interference with Medical Device)  12/7/2021 0208 by Anna Trejo V RJelaniN.  Flowsheets (Taken 12/7/2021 0208)  Addressed this shift: Remains free of injury from restraints (restraint for interference with medical device):   Determine that other, less restrictive measures have been tried or would not be effective before applying the restraint   Evaluate the patient's condition at the time of restraint application   Inform patient/family regarding the reason for restraint   Every 2 hours: Monitor safety, psychosocial status, comfort, nutrition and hydration  12/7/2021 0149 by Anna Trejo V R.N.  Outcome: Progressing  Goal: Free from restraint(s) (Restraint for Interference with Medical Device)  12/7/2021 0208 by Anna Trejo V R.N.  Outcome: Progressing  Flowsheets (Taken 12/7/2021 0208)  Addressed this shift: Free from restraint(s) (restraint for interference with medical device):   ONCE/SHIFT or MINIMUM Every 12 hours: Assess and document the continuing need for restraints   Every 24 hours: Continued use of restraint requires Licensed Independent Practitioner to perform face to face examination and written order   Identify and implement measures to help patient regain control  12/7/2021 0149 by Anna Trejo V R.N.  Outcome: Progressing          Patient is not progressing  towards the following goals:         Hemodynamic stability attempted to be maintained. Upon any stimulus pts BP will Tom to 170's. Given one dose of Labetalol of 20mg which is effective. One Dose of IV Metoprolol given for A-FiB with RVR with rate of 140. After Metoprolol 5 mg IV pt converted to NSR.

## 2021-12-07 NOTE — PROGRESS NOTES
2149 Pt converted to A-Fib with RVR rate of 115-140. /76. Dr. Pearl notified. New orders received and noted.

## 2021-12-07 NOTE — PROGRESS NOTES
"Radiology Progress Note   Author: KENNEDY Hyatt Date & Time created: 12/7/2021  11:17 AM   Date of admission  12/1/2021  Note to reader: this note follows the APSO format rather than the historical SOAP format. Assessment and plan located at the top of the note for ease of use.    Chief Complaint  56 y.o. female admitted 12/1/2021 with   Chief Complaint   Patient presents with   • LUQ Pain   • Chest Pain   • Arm Pain   • Shortness of Breath       HPI  \" 56 y.o. female admitted 12/1/2021 with acute pancreatitis, pulmonary embolism.  She has a history of prior gastrointestinal bleeding from unknown source, MRSA sinusitis on outpatient antibiotics, Valentín's disease, GERD, primary hypertension, hemochromatosis, hypothyroidism, traumatic brain injury, myocardial infarction and fibromyalgia\" per Critical care MD note      Assessment/Plan  Interval History   Principal Problem:    Cardiac arrest (HCC)  Active Problems:    Acute respiratory failure with hypoxia (HCC)    Obesity (BMI 35.0-39.9 without comorbidity)    Hypothyroidism    Primary hypertension    Acute kidney injury (HCC)    MRSA and E. coli sinusitis    Elevated LFTs    Adrenal insufficiency (Valentín's disease) (HCC)    Hemochromatosis    Thrombocytopenia (HCC)    Acute blood loss anemia    PE (pulmonary thromboembolism) (HCC)    Acute pancreatitis    Shock (HCC)    DVT (deep venous thrombosis) (HCC)    Gastrointestinal hemorrhage    Hemoperitoneum    Hypomagnesemia    Hypocalcemia    Paroxysmal atrial fibrillation (HCC)    Hypokalemia      Plan IR  - IVC filter in place   - IVC education provided to patients  at bedside   - IVC filters should be removed when the risk of PE/ DVT and the risks of filter removal are acceptably low  - If acute DVT or PE is present, at least 2-3 weeks of anticoagulation should be given prior to IVC filter removal, prefer to remove IVC filter within 1 year if possible   - OP Consult for Vascular Medicine for IVC " filter removal in future placed   -Thank you for allowing Interventional Radiology team to participate in the patients care, if any additonal care or requests are needed in the future please do not hesitate call or place IR order       [id number]  IR:   12/6- Left lower extremity DVT with hemoperitoneum. Patient can no longer be anticoagulated.  Patient has contrast allergy and allergy to Benadryl         Review of Systems  Physical Exam   Review of Systems   Unable to perform ROS: Acuity of condition      Vitals:    12/07/21 1005   BP: (!) 175/113   Pulse: 71   Resp: (!) 25   Temp:    SpO2: 94%        Physical Exam  Vitals and nursing note reviewed.   Constitutional:       Appearance: She is morbidly obese. She is ill-appearing.      Interventions: She is sedated and intubated.   HENT:      Head: Normocephalic.      Nose: Nose normal.      Comments: Core track      Mouth/Throat:      Mouth: Mucous membranes are dry.   Neck:        Comments: CVC site CDI, no redness or swelling   Cardiovascular:      Rate and Rhythm: Normal rate.   Pulmonary:      Effort: She is intubated.   Abdominal:      Palpations: Abdomen is soft.   Genitourinary:     Comments: Perez  Musculoskeletal:      Cervical back: Neck supple.      Right lower leg: Edema present.      Left lower leg: Edema present.   Skin:     General: Skin is dry.      Capillary Refill: Capillary refill takes less than 2 seconds.      Coloration: Skin is pale.      Comments: Right IJ IR access site CDI, no redness or swelling    Neurological:      Comments: Unable to assess    Psychiatric:      Comments: Unable to assess              Labs    Recent Labs     12/05/21  0626 12/06/21  0515 12/07/21  0340   WBC 12.0* 11.2* 9.1   RBC 3.69* 3.78* 4.00*   HEMOGLOBIN 10.1* 10.3* 10.7*   HEMATOCRIT 29.6* 31.3* 33.6*   MCV 80.2* 82.8 84.0   MCH 27.4 27.2 26.8*   MCHC 34.1 32.9* 31.8*   RDW 67.7* 70.8* 72.4*   PLATELETCT 55* 34* 82*     Recent Labs     12/05/21  0626 12/06/21  0515  12/07/21  0340   SODIUM 143 147* 148*   POTASSIUM 4.5 3.6 2.7*   CHLORIDE 101 101 102   CO2 29 32 31   GLUCOSE 241* 191* 236*   BUN 56* 71* 56*   CREATININE 3.21* 3.50* 2.80*   CALCIUM 7.6* 8.3* 8.3*     Recent Labs     12/05/21  0626 12/06/21  0515 12/07/21  0340   ALBUMIN 2.4* 2.5* 2.7*   TBILIRUBIN 0.7 0.6 0.7   ALKPHOSPHAT 148* 181* 196*   TOTPROTEIN 4.7* 4.9* 4.9*   ALTSGPT 74* 69* 69*   ASTSGOT 84* 73* 74*   CREATININE 3.21* 3.50* 2.80*     DX-CHEST-PORTABLE (1 VIEW)   Final Result         1.  Left lower lobe atelectasis or infiltrates, similar compared to prior study.   2.  Cardiomegaly      IR-INSERT IVC FILTER WITH IG & SI   Final Result      Ultrasound and fluoroscopic guided placement of an Option Elite IVC filter.      DX-CHEST-PORTABLE (1 VIEW)   Final Result         1.  Pulmonary edema and/or infiltrates are identified, which are stable since the prior exam.   2.  Cardiomegaly      DX-CHEST-PORTABLE (1 VIEW)   Final Result      Tubes and lines as described above.      Bibasilar atelectasis with small pleural effusions.      DX-CHEST-PORTABLE (1 VIEW)   Final Result         1. No significant interval change.      SU-READWHU-7 VIEW   Final Result      Cortrak feeding tube tip projects in the region of the mid stomach.      DX-CHEST-PORTABLE (1 VIEW)   Final Result      Placement of right IJ dialysis catheter.      Otherwise stable findings.      DX-CHEST-PORTABLE (1 VIEW)   Final Result         1.  Pulmonary edema and/or infiltrates are identified, which are stable since the prior exam.   2.  Cardiomegaly      DX-CHEST-PORTABLE (1 VIEW)   Final Result         1.  Pulmonary edema and/or infiltrates are identified, which are stable since the prior exam.   2.  Cardiomegaly      DX-CHEST-FOR LINE PLACEMENT Perform procedure in: Patient's Room   Final Result      Interval placement of a left IJ central venous catheter with the tip overlying the right atrium.      US-EXTREMITY VENOUS LOWER BILAT   Final Result       CTA ABDOMEN PELVIS W & W/O POST PROCESS   Final Result      1.  Small to moderate amount of hemoperitoneum in the abdomen and pelvis. No active extravasation is identified.   2.  No aortic aneurysm or dissection.   3.  Peripancreatic stranding can be related to pancreatitis.   4.  Cardiomegaly.   5.  Hepatic steatosis.   6.  Status post cholecystectomy.   7.  Atrophic kidneys bilaterally.   8.  Status post gastric bypass surgery.   9.  Mild wall thickening of the second portion of the duodenum with surrounding inflammation. This can be seen in duodenitis/peptic ulcer disease.   10.  Colonic diverticulosis.   11.  Bibasilar atelectasis/consolidation. Groundglass opacities with septal thickening at the lung bases can be seen in the setting of edema or multifocal pneumonia.   12.  Bilateral anterior rib fractures.      Findings discussed with Dr. Rush.      DX-CHEST-PORTABLE (1 VIEW)   Final Result      1.  Endotracheal tube present.      2.  Cardiomegaly with interstitial prominence.      EC-ECHOCARDIOGRAM COMPLETE W/ CONT   Final Result      CT-CTA CHEST PULMONARY ARTERY W/ RECONS   Final Result      1.  Positive for pulmonary embolism located in multiple segmental and subsegmental branches      2.  Minimal elevation of RV/LV ratio      3.  Mild atelectasis      4.  Left PICC line present and appears appropriately located      5.  Findings were discussed with BLAINE VEGA on 12/1/2021 5:35 PM.                  CT-ABDOMEN-PELVIS WITH   Final Result      1.  Apparent inflammation of the pancreas with surrounding fat stranding and fluid suggesting pancreatitis. Recommend correlation with lipase.      2.  Diverticulosis without diverticulitis.      3.  Hepatic steatosis.      US-EXTREMITY VENOUS UPPER UNILAT LEFT   Final Result      DX-CHEST-PORTABLE (1 VIEW)   Final Result      1.  No acute cardiopulmonary abnormality identified.      2.  Left PICC line appears appropriately located      CT-ABDOMEN-PELVIS WITH     (Results Pending)       INR   Date Value Ref Range Status   12/06/2021 1.17 (H) 0.87 - 1.13 Final     Comment:     INR - Non-therapeutic Reference Range: 0.87-1.13  INR - Therapeutic Reference Range: 2.0-4.0       No results found for: POCINR     Intake/Output Summary (Last 24 hours) at 12/7/2021 1117  Last data filed at 12/7/2021 0600  Gross per 24 hour   Intake 779.44 ml   Output 2025 ml   Net -1245.56 ml      Labs not explicitly included in this progress note were reviewed by the author. Radiology/imaging not explicitly included in this progress note was reviewed by the author.     I have performed a physical exam and reviewed and updated ROS and Plan today (12/7/2021).     15 minutes in directly providing and coordinating care and extensive data review.  No time overlap and excludes procedures.

## 2021-12-07 NOTE — PROGRESS NOTES
Nephrology Daily Progress Note    Date of Service  12/7/2021    Chief Complaint  56 y.o. female with a history of hypertension, hemochromatosis, fibromyalgia admitted 12/1/2021 with pancreatitis and pulmonary embolism, hospital course complicated by cardiac arrest    Interval Problem Update  12/6-urine output 1.2 L in the last 24 hours.  Patient remains intubated and sedated, unable to obtain review of systems.  12/7 -urine output 2.2 L in the last 24 hours.  Patient remains intubated and sedated.  Patient had IVC filter placed yesterday.  Patient remains off of pressors.    Review of Systems  Review of Systems   Unable to perform ROS: Intubated        Physical Exam  Temp:  [36.4 °C (97.5 °F)] 36.4 °C (97.5 °F)  Pulse:  [3-142] 64  Resp:  [2-25] 24  BP: (111-208)/() 191/100  SpO2:  [83 %-100 %] 95 %    Physical Exam  Constitutional:       General: She is not in acute distress.     Appearance: She is well-developed. She is obese. She is ill-appearing.      Interventions: She is sedated and intubated.   HENT:      Mouth/Throat:      Pharynx: Oropharynx is clear. No oropharyngeal exudate.   Eyes:      General: No scleral icterus.        Right eye: No discharge.         Left eye: No discharge.   Neck:      Trachea: No tracheal deviation.   Cardiovascular:      Heart sounds: Normal heart sounds. No murmur heard.      Pulmonary:      Effort: Pulmonary effort is normal. She is intubated.      Breath sounds: No stridor. No rales.   Abdominal:      Palpations: Abdomen is soft.      Tenderness: There is no abdominal tenderness.   Musculoskeletal:         General: No deformity.      Right lower leg: Edema present.      Left lower leg: Edema present.   Skin:     General: Skin is warm and dry.   Neurological:      Comments: Unable to obtain due to intubation and sedation   Psychiatric:      Comments: Unable to obtain due to intubation and sedation     Dialysis access: Right IJ temp Vas-Cath      Fluids    Intake/Output  Summary (Last 24 hours) at 12/7/2021 1514  Last data filed at 12/7/2021 0600  Gross per 24 hour   Intake 736.61 ml   Output 1625 ml   Net -888.39 ml       Laboratory  Labs reviewed, pertinent labs below.  Recent Labs     12/05/21 0626 12/06/21 0515 12/07/21 0340   WBC 12.0* 11.2* 9.1   RBC 3.69* 3.78* 4.00*   HEMOGLOBIN 10.1* 10.3* 10.7*   HEMATOCRIT 29.6* 31.3* 33.6*   MCV 80.2* 82.8 84.0   MCH 27.4 27.2 26.8*   MCHC 34.1 32.9* 31.8*   RDW 67.7* 70.8* 72.4*   PLATELETCT 55* 34* 82*     Recent Labs     12/05/21 0626 12/05/21 0626 12/06/21  0515 12/07/21  0340 12/07/21  1035   SODIUM 143  --  147* 148*  --    POTASSIUM 4.5   < > 3.6 2.7* 2.8*   CHLORIDE 101  --  101 102  --    CO2 29  --  32 31  --    GLUCOSE 241*  --  191* 236*  --    BUN 56*  --  71* 56*  --    CREATININE 3.21*  --  3.50* 2.80*  --    CALCIUM 7.6*  --  8.3* 8.3*  --     < > = values in this interval not displayed.     Recent Labs     12/05/21 0626 12/06/21 0515   INR 1.19* 1.17*           URINALYSIS:  Lab Results   Component Value Date/Time    COLORURINE DK Yellow 12/01/2021 1832    CLARITY Cloudy (A) 12/01/2021 1832    SPECGRAVITY 1.030 12/01/2021 1832    PHURINE 5.0 12/01/2021 1832    KETONES Negative 12/01/2021 1832    PROTEINURIN 30 (A) 12/01/2021 1832    BILIRUBINUR Negative 12/01/2021 1832    UROBILU 0.2 12/01/2021 1832    NITRITE Negative 12/01/2021 1832    LEUKESTERAS Trace (A) 12/01/2021 1832    OCCULTBLOOD Trace (A) 12/01/2021 1832     UP  No results found for: TOTPROTUR   Lab Results   Component Value Date/Time    CREATININEU Not Applicable 10/25/2021 1145    CREATININEU 95.52 10/25/2021 1145         Imaging interpreted by radiologist. Imaging reports reviewed with pertinent findings below  DX-CHEST-PORTABLE (1 VIEW)   Final Result         1.  Left lower lobe atelectasis or infiltrates, similar compared to prior study.   2.  Cardiomegaly      IR-INSERT IVC FILTER WITH IG & SI   Final Result      Ultrasound and fluoroscopic guided  placement of an Option Elite IVC filter.      DX-CHEST-PORTABLE (1 VIEW)   Final Result         1.  Pulmonary edema and/or infiltrates are identified, which are stable since the prior exam.   2.  Cardiomegaly      DX-CHEST-PORTABLE (1 VIEW)   Final Result      Tubes and lines as described above.      Bibasilar atelectasis with small pleural effusions.      DX-CHEST-PORTABLE (1 VIEW)   Final Result         1. No significant interval change.      SV-VMAIEFH-0 VIEW   Final Result      Cortrak feeding tube tip projects in the region of the mid stomach.      DX-CHEST-PORTABLE (1 VIEW)   Final Result      Placement of right IJ dialysis catheter.      Otherwise stable findings.      DX-CHEST-PORTABLE (1 VIEW)   Final Result         1.  Pulmonary edema and/or infiltrates are identified, which are stable since the prior exam.   2.  Cardiomegaly      DX-CHEST-PORTABLE (1 VIEW)   Final Result         1.  Pulmonary edema and/or infiltrates are identified, which are stable since the prior exam.   2.  Cardiomegaly      DX-CHEST-FOR LINE PLACEMENT Perform procedure in: Patient's Room   Final Result      Interval placement of a left IJ central venous catheter with the tip overlying the right atrium.      US-EXTREMITY VENOUS LOWER BILAT   Final Result      CTA ABDOMEN PELVIS W & W/O POST PROCESS   Final Result      1.  Small to moderate amount of hemoperitoneum in the abdomen and pelvis. No active extravasation is identified.   2.  No aortic aneurysm or dissection.   3.  Peripancreatic stranding can be related to pancreatitis.   4.  Cardiomegaly.   5.  Hepatic steatosis.   6.  Status post cholecystectomy.   7.  Atrophic kidneys bilaterally.   8.  Status post gastric bypass surgery.   9.  Mild wall thickening of the second portion of the duodenum with surrounding inflammation. This can be seen in duodenitis/peptic ulcer disease.   10.  Colonic diverticulosis.   11.  Bibasilar atelectasis/consolidation. Groundglass opacities with  septal thickening at the lung bases can be seen in the setting of edema or multifocal pneumonia.   12.  Bilateral anterior rib fractures.      Findings discussed with Dr. Rush.      DX-CHEST-PORTABLE (1 VIEW)   Final Result      1.  Endotracheal tube present.      2.  Cardiomegaly with interstitial prominence.      EC-ECHOCARDIOGRAM COMPLETE W/ CONT   Final Result      CT-CTA CHEST PULMONARY ARTERY W/ RECONS   Final Result      1.  Positive for pulmonary embolism located in multiple segmental and subsegmental branches      2.  Minimal elevation of RV/LV ratio      3.  Mild atelectasis      4.  Left PICC line present and appears appropriately located      5.  Findings were discussed with BLAINE VEGA on 12/1/2021 5:35 PM.                  CT-ABDOMEN-PELVIS WITH   Final Result      1.  Apparent inflammation of the pancreas with surrounding fat stranding and fluid suggesting pancreatitis. Recommend correlation with lipase.      2.  Diverticulosis without diverticulitis.      3.  Hepatic steatosis.      US-EXTREMITY VENOUS UPPER UNILAT LEFT   Final Result      DX-CHEST-PORTABLE (1 VIEW)   Final Result      1.  No acute cardiopulmonary abnormality identified.      2.  Left PICC line appears appropriately located      CT-ABDOMEN-PELVIS WITH    (Results Pending)         Current Facility-Administered Medications   Medication Dose Route Frequency Provider Last Rate Last Admin   • K+ Scale: Goal of 4.5  1 Each Intravenous Q6HRS Eligio Del Castillo M.D.   1 Each at 12/07/21 0800   • hydrocortisone sodium succinate PF (Solu-CORTEF) 100 MG injection 25 mg  25 mg Intravenous Q8HRS Eligio Del Castillo M.D.   25 mg at 12/07/21 1449   • carvedilol (COREG) tablet 25 mg  25 mg Enteral Tube BID Eligio Del Castillo M.D.   25 mg at 12/07/21 1022   • potassium chloride (KCL) ivpb 10 mEq  10 mEq Intravenous Once Eligio Del Castillo M.D.   Stopped at 12/07/21 1545   • doxazosin (CARDURA) tablet 2 mg  2 mg Enteral Tube DAILY  Eligio Del Castillo M.D.       • AMILoride (MIDAMOR) tablet 5 mg  5 mg Enteral Tube Q DAY Eligio Del Castillo M.D.       • propofol (DIPRIVAN) injection  0-80 mcg/kg/min Intravenous Continuous Eligio Del Castillo M.D. 15.3 mL/hr at 12/07/21 1457 25 mcg/kg/min at 12/07/21 1457   • hydrALAZINE (APRESOLINE) injection 20 mg  20 mg Intravenous Q4HRS PRN Eligio Del Castillo M.D.   20 mg at 12/06/21 0244   • insulin regular (HumuLIN R,NovoLIN R) injection  3-14 Units Subcutaneous Q6HRS Eligio Del Castillo M.D.   4 Units at 12/07/21 1136    And   • dextrose 50% (D50W) injection 50 mL  50 mL Intravenous Q15 MIN PRN Eligio Del Castillo M.D.       • liothyronine (CYTOMEL) tablet 25 mcg  25 mcg Enteral Tube QAM AC Eligio Del Castillo M.D.   25 mcg at 12/07/21 0818   • levothyroxine (SYNTHROID) tablet 75 mcg  75 mcg Enteral Tube AM ES Eligio Del Castillo M.D.   75 mcg at 12/07/21 0613   • omeprazole (FIRST-OMEPRAZOLE) 2 mg/mL oral susp 40 mg  40 mg Enteral Tube Q12HRS Eligio Del Castillo M.D.   40 mg at 12/07/21 0614   • fludrocortisone (FLORINEF) tablet 0.1 mg  0.1 mg Enteral Tube BID Eligio Del Castillo M.D.   0.1 mg at 12/07/21 0613   • Pharmacy Consult: Enteral tube insertion - review meds/change route/product selection  1 Each Other PHARMACY TO DOSE Eligio Del Castillo M.D.       • senna-docusate (PERICOLACE or SENOKOT S) 8.6-50 MG per tablet 2 Tablet  2 Tablet Enteral Tube BID Eligio Del Castillo M.D.   2 Tablet at 12/06/21 0508    And   • polyethylene glycol/lytes (MIRALAX) PACKET 1 Packet  1 Packet Enteral Tube QDAY PRN Eligio Del Castillo M.D.        And   • magnesium hydroxide (MILK OF MAGNESIA) suspension 30 mL  30 mL Enteral Tube QDAY PRN Eligio Del Castillo M.D.        And   • bisacodyl (DULCOLAX) suppository 10 mg  10 mg Rectal QDAY PRN Eligio Del Castillo M.D.       • ondansetron (ZOFRAN ODT) dispertab 4 mg  4 mg Enteral Tube Q4HRS PRN Eligio Del Castillo M.D.        • labetalol (NORMODYNE/TRANDATE) injection 10-20 mg  10-20 mg Intravenous Q4HRS PRN Eligio Del Castillo M.D.   20 mg at 12/07/21 0640   • acetaminophen (TYLENOL) suppository 650 mg  650 mg Rectal Q4HRS PRN Martha Mirza M.D.   650 mg at 12/03/21 0250   • MD Alert...Vancomycin per Pharmacy   Other PHARMACY TO DOSE Eligio Del Castillo M.D.       • cefTRIAXone (Rocephin) 2 g in  mL IVPB  2 g Intravenous Q24HRS Eligio Del Castillo M.D.   Stopped at 12/07/21 0644   • Respiratory Therapy Consult   Nebulization Continuous RT Servando Rush M.D.       • MD Alert...ICU Electrolyte Replacement per Pharmacy   Other PHARMACY TO DOSE Servando Rush M.D.       • lidocaine (XYLOCAINE) 1 % injection 2 mL  2 mL Tracheal Tube Q30 MIN PRN Servando Rush M.D.       • HYDROmorphone (DILAUDID) 0.2 mg/mL in 50mL NS (Continuous Infusion)   Intravenous Continuous Eligio Del Castillo M.D. 5 mL/hr at 12/07/21 0940 1 mg/hr at 12/07/21 0940   • HYDROmorphone (Dilaudid) injection 0.5-2 mg  0.5-2 mg Intravenous Q HOUR PRN Eligio Del Castillo M.D.   2 mg at 12/05/21 1552   • Pharmacy Consult Request ...Pain Management Review 1 Each  1 Each Other PHARMACY TO DOSE David Yoder M.D.       • ondansetron (ZOFRAN) syringe/vial injection 4 mg  4 mg Intravenous Q4HRS PRN David Yoder M.D.       • promethazine (PHENERGAN) suppository 12.5-25 mg  12.5-25 mg Rectal Q4HRS PRN David Yoder M.D.             Assessment/Plan  56 y.o. female with a history of hypertension, hemochromatosis, fibromyalgia, obesity status post gastric bypass admitted 12/1/2021 with pancreatitis and pulmonary embolism, hospital course complicated by cardiac arrest    1.  Acute kidney injury, nonoliguric, creatinine improving.  MARY LOU likely from ATN or contrast nephropathy from contrast scans on 12/1/2021 and 12/2/2021.  There is no acute need for dialysis.  Avoid nephrotoxins.  Check labs daily.  -Access: Right IJ temporary dialysis catheter,  never used.  Okay to discontinue from nephrology standpoint.    2.  Hypokalemia, worsening.  Replete potassium per ICU protocol.  Check labs daily.    3.  Hypernatremia, worsening.  Recommend free water flushes per enteral tube.  Check labs daily.    4.  Acute hypoxic ventilator dependent respiratory failure.  Persistent.  Defer ventilator management deferred to critical care team.  If patient becomes more hypoxic, consider trial of high-dose Lasix 80 mg IV twice daily.  If high-dose Lasix fails, patient might require dialysis.    5.  Normocytic anemia, persistent.  Unclear etiology.  Check CBC daily.    6.  Thrombocytopenia, persistent.  Unclear etiology.  Check CBC daily.    7.  Leukocytosis, improved.  Check CBC daily.    8.  DVT/PE, noted on this admission, on 12/1/2021 CT PE scan.  Patient is status post IVC filter placement on 12/6/2021.  Defer further management to primary team and interventional radiology.    As the patient is nonoliguric and kidney function is improving, nephrology will sign off.  Please call back with further questions or concerns.    Fernando Fuentes MD  Nephrology

## 2021-12-07 NOTE — OR SURGEON
Immediate Post- Operative Note        Findings: Vena Cava       Procedure(s): Successful infrarenal IVC filter placement      Estimated Blood Loss: Less than 5 ml        Complications: None            12/6/2021     4:11 PM     Charlie Moffett M.D.

## 2021-12-07 NOTE — CARE PLAN
The patient is Watcher - Medium risk of patient condition declining or worsening    Shift Goals  Clinical Goals: Hemodynamic Stability/Hr rate control  Patient Goals: DONNA  Family Goals: DONNA    Progress made toward(s) clinical / shift goals:  Medical Restraints: Patient without signs of skin breakdown. Tolerating well; safety maintained.      Patient is not progressing towards the following goals:

## 2021-12-07 NOTE — PROGRESS NOTES
Per Dr Del Castillo, OK to remove ART line if it is correlating with NIBP cuff, after patient returns from IR (IVC filter placement.)    ART line and NIBP correlating at this time.    While in IR, patients HR increased to 130s to 150s, not sustained. After a few minutes in IR, it appeared patient would run 120's then occasionally bounce back upward to 150, then return to a lower rate.   Apparently, patient does not like to be on her back, combined with being poked in the neck for IVC insertion, HR increased.    Upon return to ICU, patient was running in the HR 110s to 1120s. Metoprolol 5mg provided, per MD order and HR as well as BP returned to baseline.    Patient tolerating well.  VSS.

## 2021-12-08 ENCOUNTER — APPOINTMENT (OUTPATIENT)
Dept: RADIOLOGY | Facility: MEDICAL CENTER | Age: 56
DRG: 981 | End: 2021-12-08
Attending: INTERNAL MEDICINE
Payer: MEDICARE

## 2021-12-08 PROBLEM — J98.11 ATELECTASIS: Status: ACTIVE | Noted: 2021-12-08

## 2021-12-08 LAB
ALBUMIN SERPL BCP-MCNC: 2.8 G/DL (ref 3.2–4.9)
ALBUMIN/GLOB SERPL: 1.4 G/DL
ALP SERPL-CCNC: 235 U/L (ref 30–99)
ALT SERPL-CCNC: 68 U/L (ref 2–50)
ANION GAP SERPL CALC-SCNC: 11 MMOL/L (ref 7–16)
ANISOCYTOSIS BLD QL SMEAR: ABNORMAL
AST SERPL-CCNC: 72 U/L (ref 12–45)
BASE EXCESS BLDA CALC-SCNC: 13 MMOL/L (ref -4–3)
BASOPHILS # BLD AUTO: 0 % (ref 0–1.8)
BASOPHILS # BLD: 0 K/UL (ref 0–0.12)
BILIRUB SERPL-MCNC: 0.5 MG/DL (ref 0.1–1.5)
BODY TEMPERATURE: ABNORMAL DEGREES
BUN SERPL-MCNC: 70 MG/DL (ref 8–22)
BURR CELLS BLD QL SMEAR: NORMAL
CA-I SERPL-SCNC: 1.1 MMOL/L (ref 1.1–1.3)
CALCIUM SERPL-MCNC: 8.6 MG/DL (ref 8.5–10.5)
CHLORIDE SERPL-SCNC: 104 MMOL/L (ref 96–112)
CO2 BLDA-SCNC: 38 MMOL/L (ref 20–33)
CO2 SERPL-SCNC: 34 MMOL/L (ref 20–33)
CREAT SERPL-MCNC: 2.06 MG/DL (ref 0.5–1.4)
CYTOLOGY REG CYTOL: NORMAL
DELSYS IDSYS: ABNORMAL
END TIDAL CARBON DIOXIDE IECO2: 37 MMHG
EOSINOPHIL # BLD AUTO: 0.18 K/UL (ref 0–0.51)
EOSINOPHIL NFR BLD: 1.8 % (ref 0–6.9)
ERYTHROCYTE [DISTWIDTH] IN BLOOD BY AUTOMATED COUNT: 74.5 FL (ref 35.9–50)
GLOBULIN SER CALC-MCNC: 2 G/DL (ref 1.9–3.5)
GLUCOSE BLD-MCNC: 191 MG/DL (ref 65–99)
GLUCOSE BLD-MCNC: 211 MG/DL (ref 65–99)
GLUCOSE BLD-MCNC: 212 MG/DL (ref 65–99)
GLUCOSE BLD-MCNC: 215 MG/DL (ref 65–99)
GLUCOSE SERPL-MCNC: 209 MG/DL (ref 65–99)
HCO3 BLDA-SCNC: 36.7 MMOL/L (ref 17–25)
HCT VFR BLD AUTO: 35.6 % (ref 37–47)
HGB BLD-MCNC: 10.7 G/DL (ref 12–16)
HOROWITZ INDEX BLDA+IHG-RTO: 113 MM[HG]
HYPOCHROMIA BLD QL SMEAR: ABNORMAL
LYMPHOCYTES # BLD AUTO: 0.44 K/UL (ref 1–4.8)
LYMPHOCYTES NFR BLD: 4.4 % (ref 22–41)
MAGNESIUM SERPL-MCNC: 2.1 MG/DL (ref 1.5–2.5)
MANUAL DIFF BLD: NORMAL
MCH RBC QN AUTO: 26.4 PG (ref 27–33)
MCHC RBC AUTO-ENTMCNC: 30.1 G/DL (ref 33.6–35)
MCV RBC AUTO: 87.7 FL (ref 81.4–97.8)
METAMYELOCYTES NFR BLD MANUAL: 1.8 %
MODE IMODE: ABNORMAL
MONOCYTES # BLD AUTO: 1.26 K/UL (ref 0–0.85)
MONOCYTES NFR BLD AUTO: 12.5 % (ref 0–13.4)
MORPHOLOGY BLD-IMP: NORMAL
NEUTROPHILS # BLD AUTO: 8.03 K/UL (ref 2–7.15)
NEUTROPHILS NFR BLD: 75.9 % (ref 44–72)
NEUTS BAND NFR BLD MANUAL: 3.6 % (ref 0–10)
NRBC # BLD AUTO: 0.05 K/UL
NRBC BLD-RTO: 0.5 /100 WBC
O2/TOTAL GAS SETTING VFR VENT: 60 %
OVALOCYTES BLD QL SMEAR: NORMAL
PCO2 BLDA: 42.2 MMHG (ref 26–37)
PCO2 TEMP ADJ BLDA: 40.4 MMHG (ref 26–37)
PEEP END EXPIRATORY PRESSURE IPEEP: 8 CMH20
PERCENT MINUTE VOLUME IPMV: 120
PH BLDA: 7.55 [PH] (ref 7.4–7.5)
PH TEMP ADJ BLDA: 7.56 [PH] (ref 7.4–7.5)
PHOSPHATE SERPL-MCNC: 4.2 MG/DL (ref 2.5–4.5)
PLATELET # BLD AUTO: 123 K/UL (ref 164–446)
PLATELET BLD QL SMEAR: NORMAL
PO2 BLDA: 68 MMHG (ref 64–87)
PO2 TEMP ADJ BLDA: 64 MMHG (ref 64–87)
POIKILOCYTOSIS BLD QL SMEAR: NORMAL
POLYCHROMASIA BLD QL SMEAR: NORMAL
POTASSIUM SERPL-SCNC: 2.8 MMOL/L (ref 3.6–5.5)
POTASSIUM SERPL-SCNC: 3.2 MMOL/L (ref 3.6–5.5)
POTASSIUM SERPL-SCNC: 3.3 MMOL/L (ref 3.6–5.5)
POTASSIUM SERPL-SCNC: 3.5 MMOL/L (ref 3.6–5.5)
PROT SERPL-MCNC: 4.8 G/DL (ref 6–8.2)
RBC # BLD AUTO: 4.06 M/UL (ref 4.2–5.4)
RBC BLD AUTO: PRESENT
SAO2 % BLDA: 95 % (ref 93–99)
SODIUM SERPL-SCNC: 149 MMOL/L (ref 135–145)
SPECIMEN DRAWN FROM PATIENT: ABNORMAL
TARGETS BLD QL SMEAR: NORMAL
VANCOMYCIN SERPL-MCNC: 21.9 UG/ML
WBC # BLD AUTO: 10.1 K/UL (ref 4.8–10.8)

## 2021-12-08 PROCEDURE — 700111 HCHG RX REV CODE 636 W/ 250 OVERRIDE (IP): Performed by: INTERNAL MEDICINE

## 2021-12-08 PROCEDURE — 36600 WITHDRAWAL OF ARTERIAL BLOOD: CPT

## 2021-12-08 PROCEDURE — 84100 ASSAY OF PHOSPHORUS: CPT

## 2021-12-08 PROCEDURE — 87205 SMEAR GRAM STAIN: CPT

## 2021-12-08 PROCEDURE — 31624 DX BRONCHOSCOPE/LAVAGE: CPT | Performed by: INTERNAL MEDICINE

## 2021-12-08 PROCEDURE — 82330 ASSAY OF CALCIUM: CPT

## 2021-12-08 PROCEDURE — 0B9J8ZX DRAINAGE OF LEFT LOWER LUNG LOBE, VIA NATURAL OR ARTIFICIAL OPENING ENDOSCOPIC, DIAGNOSTIC: ICD-10-PCS | Performed by: INTERNAL MEDICINE

## 2021-12-08 PROCEDURE — 94003 VENT MGMT INPAT SUBQ DAY: CPT

## 2021-12-08 PROCEDURE — 94799 UNLISTED PULMONARY SVC/PX: CPT

## 2021-12-08 PROCEDURE — 0B938ZZ DRAINAGE OF RIGHT MAIN BRONCHUS, VIA NATURAL OR ARTIFICIAL OPENING ENDOSCOPIC: ICD-10-PCS | Performed by: INTERNAL MEDICINE

## 2021-12-08 PROCEDURE — 84132 ASSAY OF SERUM POTASSIUM: CPT | Mod: 91

## 2021-12-08 PROCEDURE — 82803 BLOOD GASES ANY COMBINATION: CPT

## 2021-12-08 PROCEDURE — 31645 BRNCHSC W/THER ASPIR 1ST: CPT | Performed by: INTERNAL MEDICINE

## 2021-12-08 PROCEDURE — 99152 MOD SED SAME PHYS/QHP 5/>YRS: CPT

## 2021-12-08 PROCEDURE — 87116 MYCOBACTERIA CULTURE: CPT

## 2021-12-08 PROCEDURE — 0B918ZZ DRAINAGE OF TRACHEA, VIA NATURAL OR ARTIFICIAL OPENING ENDOSCOPIC: ICD-10-PCS | Performed by: INTERNAL MEDICINE

## 2021-12-08 PROCEDURE — 80202 ASSAY OF VANCOMYCIN: CPT

## 2021-12-08 PROCEDURE — 88305 TISSUE EXAM BY PATHOLOGIST: CPT

## 2021-12-08 PROCEDURE — 71045 X-RAY EXAM CHEST 1 VIEW: CPT

## 2021-12-08 PROCEDURE — 80053 COMPREHEN METABOLIC PANEL: CPT

## 2021-12-08 PROCEDURE — 88112 CYTOPATH CELL ENHANCE TECH: CPT

## 2021-12-08 PROCEDURE — 87070 CULTURE OTHR SPECIMN AEROBIC: CPT

## 2021-12-08 PROCEDURE — 700102 HCHG RX REV CODE 250 W/ 637 OVERRIDE(OP): Performed by: INTERNAL MEDICINE

## 2021-12-08 PROCEDURE — A9270 NON-COVERED ITEM OR SERVICE: HCPCS | Performed by: INTERNAL MEDICINE

## 2021-12-08 PROCEDURE — 85027 COMPLETE CBC AUTOMATED: CPT

## 2021-12-08 PROCEDURE — 82962 GLUCOSE BLOOD TEST: CPT | Mod: 91

## 2021-12-08 PROCEDURE — 700105 HCHG RX REV CODE 258: Performed by: INTERNAL MEDICINE

## 2021-12-08 PROCEDURE — 302978 HCHG BRONCHOSCOPY-DIAGNOSTIC

## 2021-12-08 PROCEDURE — 83735 ASSAY OF MAGNESIUM: CPT

## 2021-12-08 PROCEDURE — 85007 BL SMEAR W/DIFF WBC COUNT: CPT

## 2021-12-08 PROCEDURE — 0B978ZZ DRAINAGE OF LEFT MAIN BRONCHUS, VIA NATURAL OR ARTIFICIAL OPENING ENDOSCOPIC: ICD-10-PCS | Performed by: INTERNAL MEDICINE

## 2021-12-08 PROCEDURE — 87102 FUNGUS ISOLATION CULTURE: CPT

## 2021-12-08 PROCEDURE — 770022 HCHG ROOM/CARE - ICU (200)

## 2021-12-08 PROCEDURE — 99291 CRITICAL CARE FIRST HOUR: CPT | Mod: 25 | Performed by: INTERNAL MEDICINE

## 2021-12-08 RX ORDER — POTASSIUM CHLORIDE 14.9 MG/ML
20 INJECTION INTRAVENOUS ONCE
Status: COMPLETED | OUTPATIENT
Start: 2021-12-08 | End: 2021-12-08

## 2021-12-08 RX ORDER — POTASSIUM CHLORIDE 7.45 MG/ML
10 INJECTION INTRAVENOUS ONCE
Status: COMPLETED | OUTPATIENT
Start: 2021-12-08 | End: 2021-12-08

## 2021-12-08 RX ORDER — OXYCODONE HYDROCHLORIDE 10 MG/1
10 TABLET ORAL EVERY 4 HOURS PRN
Status: DISCONTINUED | OUTPATIENT
Start: 2021-12-08 | End: 2021-12-14

## 2021-12-08 RX ORDER — HYDROCORTISONE 20 MG/1
20 TABLET ORAL 2 TIMES DAILY
Status: DISCONTINUED | OUTPATIENT
Start: 2021-12-08 | End: 2021-12-09

## 2021-12-08 RX ORDER — OXYCODONE HYDROCHLORIDE 5 MG/1
5 TABLET ORAL EVERY 4 HOURS PRN
Status: DISCONTINUED | OUTPATIENT
Start: 2021-12-08 | End: 2021-12-14

## 2021-12-08 RX ORDER — GABAPENTIN 100 MG/1
200 CAPSULE ORAL EVERY 8 HOURS
Status: DISCONTINUED | OUTPATIENT
Start: 2021-12-08 | End: 2021-12-14

## 2021-12-08 RX ORDER — HYDROMORPHONE HYDROCHLORIDE 1 MG/ML
.5-1 INJECTION, SOLUTION INTRAMUSCULAR; INTRAVENOUS; SUBCUTANEOUS
Status: DISCONTINUED | OUTPATIENT
Start: 2021-12-08 | End: 2021-12-15

## 2021-12-08 RX ORDER — DEXAMETHASONE 1 MG
0.5 TABLET ORAL EVERY 12 HOURS
Status: DISCONTINUED | OUTPATIENT
Start: 2021-12-08 | End: 2021-12-14

## 2021-12-08 RX ADMIN — GABAPENTIN 200 MG: 100 CAPSULE ORAL at 10:27

## 2021-12-08 RX ADMIN — DEXAMETHASONE 0.5 MG: 1 TABLET ORAL at 17:38

## 2021-12-08 RX ADMIN — PROPOFOL 20 MCG/KG/MIN: 10 INJECTION, EMULSION INTRAVENOUS at 16:30

## 2021-12-08 RX ADMIN — HYDRALAZINE HYDROCHLORIDE 20 MG: 20 INJECTION INTRAMUSCULAR; INTRAVENOUS at 02:27

## 2021-12-08 RX ADMIN — VANCOMYCIN HYDROCHLORIDE 1750 MG: 500 INJECTION, POWDER, LYOPHILIZED, FOR SOLUTION INTRAVENOUS at 20:42

## 2021-12-08 RX ADMIN — HYDROCORTISONE 20 MG: 20 TABLET ORAL at 18:23

## 2021-12-08 RX ADMIN — OMEPRAZOLE 40 MG: KIT at 05:11

## 2021-12-08 RX ADMIN — POTASSIUM CHLORIDE 10 MEQ: 7.46 INJECTION, SOLUTION INTRAVENOUS at 06:51

## 2021-12-08 RX ADMIN — DEXAMETHASONE 0.5 MG: 1 TABLET ORAL at 10:28

## 2021-12-08 RX ADMIN — CARVEDILOL 25 MG: 25 TABLET, FILM COATED ORAL at 17:39

## 2021-12-08 RX ADMIN — OMEPRAZOLE 40 MG: KIT at 17:39

## 2021-12-08 RX ADMIN — GABAPENTIN 200 MG: 100 CAPSULE ORAL at 23:14

## 2021-12-08 RX ADMIN — PROPOFOL 20 MCG/KG/MIN: 10 INJECTION, EMULSION INTRAVENOUS at 10:27

## 2021-12-08 RX ADMIN — POTASSIUM CHLORIDE 20 MEQ: 14.9 INJECTION, SOLUTION INTRAVENOUS at 06:53

## 2021-12-08 RX ADMIN — HYDROCORTISONE SODIUM SUCCINATE 25 MG: 100 INJECTION, POWDER, FOR SOLUTION INTRAMUSCULAR; INTRAVENOUS at 05:10

## 2021-12-08 RX ADMIN — CEFTRIAXONE SODIUM 2 G: 2 INJECTION, POWDER, FOR SOLUTION INTRAMUSCULAR; INTRAVENOUS at 05:10

## 2021-12-08 RX ADMIN — LIOTHYRONINE SODIUM 25 MCG: 25 TABLET ORAL at 05:12

## 2021-12-08 RX ADMIN — POTASSIUM CHLORIDE 20 MEQ: 14.9 INJECTION, SOLUTION INTRAVENOUS at 19:17

## 2021-12-08 RX ADMIN — LEVOTHYROXINE SODIUM 75 MCG: 0.07 TABLET ORAL at 05:11

## 2021-12-08 RX ADMIN — POTASSIUM CHLORIDE 20 MEQ: 14.9 INJECTION, SOLUTION INTRAVENOUS at 13:43

## 2021-12-08 RX ADMIN — HYDROCORTISONE 20 MG: 20 TABLET ORAL at 10:28

## 2021-12-08 RX ADMIN — CARVEDILOL 25 MG: 25 TABLET, FILM COATED ORAL at 05:11

## 2021-12-08 RX ADMIN — DOXAZOSIN 2 MG: 2 TABLET ORAL at 05:13

## 2021-12-08 RX ADMIN — Medication 0.5 MG: at 12:24

## 2021-12-08 RX ADMIN — PROPOFOL 25 MCG/KG/MIN: 10 INJECTION, EMULSION INTRAVENOUS at 01:41

## 2021-12-08 RX ADMIN — GABAPENTIN 200 MG: 100 CAPSULE ORAL at 13:43

## 2021-12-08 RX ADMIN — FLUDROCORTISONE ACETATE 0.1 MG: 0.1 TABLET ORAL at 05:10

## 2021-12-08 RX ADMIN — FLUDROCORTISONE ACETATE 0.1 MG: 0.1 TABLET ORAL at 17:39

## 2021-12-08 RX ADMIN — AMILORIDE HYDROCLORIDE 5 MG: 5 TABLET ORAL at 05:15

## 2021-12-08 RX ADMIN — POTASSIUM CHLORIDE 20 MEQ: 14.9 INJECTION, SOLUTION INTRAVENOUS at 02:10

## 2021-12-08 RX ADMIN — POTASSIUM CHLORIDE 10 MEQ: 7.46 INJECTION, SOLUTION INTRAVENOUS at 01:36

## 2021-12-08 ASSESSMENT — PAIN DESCRIPTION - PAIN TYPE
TYPE: ACUTE PAIN

## 2021-12-08 ASSESSMENT — FIBROSIS 4 INDEX: FIB4 SCORE: 4.05

## 2021-12-08 NOTE — PROGRESS NOTES
Critical Care Progress Note    Date of admission  12/1/2021    Chief Complaint  56 y.o. female admitted 12/1/2021 with acute pancreatitis, pulmonary embolism.  She has a history of prior gastrointestinal bleeding from unknown source, MRSA sinusitis on outpatient antibiotics, Wells's disease, GERD, primary hypertension, hemochromatosis, hypothyroidism, traumatic brain injury, myocardial infarction and fibromyalgia.    Hospital Course      12/2 -    vent day 1.  Trend hemoglobin and platelet count and transfuse blood products as required.  Full vent support.  Inhaled Flolan for RV dysfunction following cardiac arrest.  12/3 -    vent day 2.  Start bicarbonate drip for MARY LOU.  Titrating norepinephrine.  Wean off Flolan.  12/4 -    vent day 3.  Renal function worse.  Titrating norepinephrine.  12/5 -    vent day 4.  Blood pressure increased.  Change dexmedetomidine to propofol.  12/6 -    vent day 5.  Arrange for IVC filter placement.  Nephrology on board.  12/7 -    vent day 6.  IVC filter placed yesterday.  Renal function improving.  Taper hydrocortisone.  12/8 -    vent day 7.  Improving renal function.  Taper hydrocortisone.      Interval Problem Update  Reviewed last 24 hour events:      SR  TF tolerated  97.3  +763 mL in the last 24  +5450 mL since admit  Replete potassium  Taper hydrocortisone to home regimen, 20 mg twice daily      Review of Systems  Review of Systems   Unable to perform ROS: Acuity of condition        Vital Signs for last 24 hours   Pulse:  [53-92] 90  Resp:  [11-25] 17  BP: (111-208)/() 114/63  SpO2:  [88 %-97 %] 89 %    Hemodynamic parameters for last 24 hours       Respiratory Information for the last 24 hours  Vent Mode: ASV  PEEP/CPAP: 8  P Support: 5  MAP: 17  Length of Weaning Trial (Hours): 10 min  Control VTE (exp VT): 280    Physical Exam   Physical Exam  Constitutional:       Appearance: She is not diaphoretic.      Comments: On ventilator   HENT:      Head: Normocephalic.       Nose: Nose normal.      Mouth/Throat:      Mouth: Mucous membranes are moist.      Pharynx: Oropharynx is clear.   Eyes:      Conjunctiva/sclera: Conjunctivae normal.      Pupils: Pupils are equal, round, and reactive to light.   Cardiovascular:      Pulses: Normal pulses.      Comments: Sinus rhythm  Pulmonary:      Breath sounds: Rales (Improved crackles) present. No wheezing.   Abdominal:      General: There is no distension.      Tenderness: There is no abdominal tenderness.      Comments: Tolerating enteral tube feedings   Musculoskeletal:      Cervical back: Normal range of motion.      Right lower leg: Edema present.      Left lower leg: Edema present.      Comments: No clubbing or cyanosis   Skin:     General: Skin is warm.   Neurological:      Comments: Sedated, arouses and nods.         Medications  Current Facility-Administered Medications   Medication Dose Route Frequency Provider Last Rate Last Admin   • hydrocortisone (CORTEF) tablet 20 mg  20 mg Enteral Tube BID Eligio Del Castillo M.D.       • carvedilol (COREG) tablet 25 mg  25 mg Enteral Tube BID Eligio Del Castillo M.D.   25 mg at 12/08/21 0511   • doxazosin (CARDURA) tablet 2 mg  2 mg Enteral Tube DAILY Eligio Del Castillo M.D.   2 mg at 12/08/21 0513   • AMILoride (MIDAMOR) tablet 5 mg  5 mg Enteral Tube Q DAY Eligio Del Castillo M.D.   5 mg at 12/08/21 0515   • K+ Scale: Goal of 4.5  1 Each Intravenous Q6HRS Eligio Del Castillo M.D.   1 Each at 12/08/21 0600   • propofol (DIPRIVAN) injection  0-80 mcg/kg/min Intravenous Continuous Eligio Del Castillo M.D. 9.2 mL/hr at 12/08/21 0530 15 mcg/kg/min at 12/08/21 0530   • hydrALAZINE (APRESOLINE) injection 20 mg  20 mg Intravenous Q4HRS PRN Eligio Del Castillo M.D.   20 mg at 12/08/21 0227   • insulin regular (HumuLIN R,NovoLIN R) injection  3-14 Units Subcutaneous Q6HRS Eligio Del Castillo M.D.   3 Units at 12/08/21 0531    And   • dextrose 50% (D50W) injection 50 mL  50 mL  Intravenous Q15 MIN PRN Eligio Del Castillo M.D.       • liothyronine (CYTOMEL) tablet 25 mcg  25 mcg Enteral Tube QAM AC Eligio Del Castillo M.D.   25 mcg at 12/08/21 0512   • levothyroxine (SYNTHROID) tablet 75 mcg  75 mcg Enteral Tube AM ES Eligio Del Castillo M.D.   75 mcg at 12/08/21 0511   • omeprazole (FIRST-OMEPRAZOLE) 2 mg/mL oral susp 40 mg  40 mg Enteral Tube Q12HRS Eligio Del Castillo M.D.   40 mg at 12/08/21 0511   • fludrocortisone (FLORINEF) tablet 0.1 mg  0.1 mg Enteral Tube BID Eligio Del Castillo M.D.   0.1 mg at 12/08/21 0510   • Pharmacy Consult: Enteral tube insertion - review meds/change route/product selection  1 Each Other PHARMACY TO DOSE Eligio Del Castillo M.D.       • senna-docusate (PERICOLACE or SENOKOT S) 8.6-50 MG per tablet 2 Tablet  2 Tablet Enteral Tube BID Eligio Del Catsillo M.D.   2 Tablet at 12/06/21 0508    And   • polyethylene glycol/lytes (MIRALAX) PACKET 1 Packet  1 Packet Enteral Tube QDAY PRN Eligio Del Castillo M.D.        And   • magnesium hydroxide (MILK OF MAGNESIA) suspension 30 mL  30 mL Enteral Tube QDAY PRN Eligio Del Castillo M.D.        And   • bisacodyl (DULCOLAX) suppository 10 mg  10 mg Rectal QDAY PRN Eligio Del Castillo M.D.       • ondansetron (ZOFRAN ODT) dispertab 4 mg  4 mg Enteral Tube Q4HRS PRN Eligio Del Castillo M.D.       • labetalol (NORMODYNE/TRANDATE) injection 10-20 mg  10-20 mg Intravenous Q4HRS PRN Eligio Del Castillo M.D.   20 mg at 12/07/21 0640   • acetaminophen (TYLENOL) suppository 650 mg  650 mg Rectal Q4HRS PRN Martha Mirza M.D.   650 mg at 12/03/21 0250   • MD Alert...Vancomycin per Pharmacy   Other PHARMACY TO DOSE Eligio Del Castillo M.D.       • cefTRIAXone (Rocephin) 2 g in  mL IVPB  2 g Intravenous Q24HRS Eligio Del Castillo M.D.   Stopped at 12/08/21 6048   • Respiratory Therapy Consult   Nebulization Continuous RT Servando Rush M.D.       • MD Alert...ICU Electrolyte  Replacement per Pharmacy   Other PHARMACY TO DOSE Servando Rush M.D.       • lidocaine (XYLOCAINE) 1 % injection 2 mL  2 mL Tracheal Tube Q30 MIN PRN Servando Rush M.D.       • HYDROmorphone (DILAUDID) 0.2 mg/mL in 50mL NS (Continuous Infusion)   Intravenous Continuous Eligio Del Castillo M.D. 5 mL/hr at 12/08/21 0803 1 mg/hr at 12/08/21 0803   • HYDROmorphone (Dilaudid) injection 0.5-2 mg  0.5-2 mg Intravenous Q HOUR PRN Eligio Del Castillo M.D.   2 mg at 12/05/21 1552   • Pharmacy Consult Request ...Pain Management Review 1 Each  1 Each Other PHARMACY TO DOSE David Yoder M.D.       • ondansetron (ZOFRAN) syringe/vial injection 4 mg  4 mg Intravenous Q4HRS PRN David Yoder M.D.       • promethazine (PHENERGAN) suppository 12.5-25 mg  12.5-25 mg Rectal Q4HRS PRN David Yoder M.D.           Fluids    Intake/Output Summary (Last 24 hours) at 12/8/2021 0842  Last data filed at 12/8/2021 0800  Gross per 24 hour   Intake 2601.73 ml   Output 1600 ml   Net 1001.73 ml       Laboratory  Recent Labs     12/06/21  0114 12/07/21  0126 12/08/21  0224   ISTATAPH 7.516* 7.568* 7.548*   ISTATAPCO2 44.2* 38.0* 42.2*   ISTATAPO2 97* 75 68   ISTATATCO2 37* 36* 38*   IOSIURU2RYK 98 97 95   ISTATARTHCO3 35.8* 34.7* 36.7*   ISTATARTBE 12* 12* 13*   ISTATTEMP 37.4 C 36.4 C 36.0 C   ISTATFIO2 60 70 60   ISTATSPEC Arterial Arterial Arterial   ISTATAPHTC 7.510* 7.577* 7.564*   LSPLYNYN3LW 100* 72 64         Recent Labs     12/06/21  0515 12/06/21  0515 12/07/21  0340 12/07/21  1035 12/07/21  1730 12/07/21  2345 12/08/21  0530   SODIUM 147*  --  148*  --   --   --  149*   POTASSIUM 3.6   < > 2.7*   < > 2.8* 2.8* 3.2*   CHLORIDE 101  --  102  --   --   --  104   CO2 32  --  31  --   --   --  34*   BUN 71*  --  56*  --   --   --  70*   CREATININE 3.50*  --  2.80*  --   --   --  2.06*   MAGNESIUM 2.4  --  2.3  --   --   --  2.1   PHOSPHORUS 7.0*  --  6.6*  --   --   --  4.2   CALCIUM 8.3*  --  8.3*  --   --   --  8.6    <  > = values in this interval not displayed.     Recent Labs     12/06/21  0515 12/06/21  0845 12/07/21  0340 12/08/21  0530   ALTSGPT 69*  --  69* 68*   ASTSGOT 73*  --  74* 72*   ALKPHOSPHAT 181*  --  196* 235*   TBILIRUBIN 0.6  --  0.7 0.5   LIPASE 25  --  16  --    PREALBUMIN  --  9.4*  --   --    GLUCOSE 191*  --  236* 209*     Recent Labs     12/06/21  0515 12/07/21  0340 12/08/21  0530   WBC 11.2* 9.1 10.1   NEUTSPOLYS 91.50* 91.10* 75.90*   LYMPHOCYTES 5.10* 2.70* 4.40*   MONOCYTES 1.70 6.20 12.50   EOSINOPHILS 0.00 0.00 1.80   BASOPHILS 0.00 0.00 0.00   ASTSGOT 73* 74* 72*   ALTSGPT 69* 69* 68*   ALKPHOSPHAT 181* 196* 235*   TBILIRUBIN 0.6 0.7 0.5     Recent Labs     12/06/21  0515 12/07/21 0340 12/08/21  0530   RBC 3.78* 4.00* 4.06*   HEMOGLOBIN 10.3* 10.7* 10.7*   HEMATOCRIT 31.3* 33.6* 35.6*   PLATELETCT 34* 82* 123*   PROTHROMBTM 14.6  --   --    INR 1.17*  --   --        Imaging  X-Ray:  I have personally reviewed the images and compared with prior images. and My impression is: Persistent left lower lobe opacification    Assessment/Plan  * Cardiac arrest (HCC)  Assessment & Plan  PEA, asystole and ventricular tachycardia in ED - precipitated by hemorrhagic shock  ROSC after 7 rounds of CPR, IV epinephrine, IV bicarbonate solution and massive transfusion protocol  Echo with RV dysfunction and RVSP of 50 mmHg    Gastrointestinal hemorrhage  Assessment & Plan  History of gastrointestinal hemorrhage with no source identified  Suspect gastrointestinal hemorrhage provoked by IV heparin for pulmonary embolism - no gross blood noted in stool - positive for occult blood  PPI twice daily  Trend hemoglobin and transfuse PRBCs to keep hemoglobin greater than 7    Shock ([facility])- (present on admission)  Assessment & Plan  Hemorrhagic shock with acute gastrointestinal blood loss and vasodistributive shock  Shock has resolved    Acute respiratory failure with hypoxia ([facility])- (present on admission)  Assessment &  Plan  Intubated 12/2  All of the appropriate ventilator bundles are in place  SAT/SBT as appropriate  Mobility level 2    Paroxysmal atrial fibrillation (HCC)  Assessment & Plan  Continue carvedilol, 25 mg twice daily  Optimize potassium and magnesium    Hemoperitoneum  Assessment & Plan  Surgery has evaluated  No indication for surgery at this time  Trend hemoglobin and transfuse PRBCs to keep hemoglobin greater than 7    DVT (deep venous thrombosis) (HCC)  Assessment & Plan  Imaging reveals LUE DVT associated with her PICC line and a distal LLE DVT  Anticoagulation strictly contraindicated due to acute gastrointestinal hemorrhage and hemoperitoneum  IVC filter placement on 12/6    Acute pancreatitis  Assessment & Plan  Resolved  She is having bowel movements  She is tolerating enteral tube feedings    PE (pulmonary thromboembolism) (HCC)- (present on admission)  Assessment & Plan  Anticoagulation is contraindicated  IVC filter placement on 12/6    Acute blood loss anemia- (present on admission)  Assessment & Plan  Suspect gastrointestinal source of blood loss as well as acute hemoperitoneum  Trend hemoglobin and transfuse PRBCs to keep hemoglobin greater than 7    Thrombocytopenia (HCC)- (present on admission)  Assessment & Plan  Trend platelet count and thromboelastogram with platelet mapping  Transfuse platelets as required    Adrenal insufficiency ([place]'s disease) (HCC)- (present on admission)  Assessment & Plan  Change hydrocortisone to 20 mg twice daily  Continue Florinef, 0.1 mg twice daily    Elevated LFTs- (present on admission)  Assessment & Plan  Due to ischemic hepatopathy from cardiac arrest and shock  Trend enzymes and synthetic function  Avoid hepatotoxins    Acute kidney injury (HCC)- (present on admission)  Assessment & Plan  Suspect ATN due to shock in lady with underlying atrophic kidneys  Monitor renal function and urine output  Avoid nephrotoxins and renal dose medications  Improving renal  function    Primary hypertension- (present on admission)  Assessment & Plan  Goal SBP less than 160  Hydralazine and labetalol as necessary to achieve blood pressure goals  Continue amiloride, 5 mg daily  Continue doxazosin, 2 mg daily  Continue carvedilol, 25 mg twice daily    Hypokalemia  Assessment & Plan  Replete potassium    Hemochromatosis- (present on admission)  Assessment & Plan  History of    MRSA and E. coli sinusitis  Assessment & Plan  On outpatient IV antibiotics - LUE PICC in place  Continue vancomycin and ceftriaxone    Hypothyroidism- (present on admission)  Assessment & Plan  Continue levothyroxine    Obesity (BMI 35.0-39.9 without comorbidity)- (present on admission)  Assessment & Plan          VTE:  Contraindicated  Ulcer: PPI  Lines: Central Line  Ongoing indication addressed and Perez Catheter  Ongoing indication addressed     I have performed a physical exam and reviewed and updated ROS and Plan today (12/8/2021). In review of yesterday's note (12/7/2021), there are no changes except as documented above.     I have assessed and reassessed her respiratory status with ventilator adjustments, ventilator waveforms, airway mechanics, blood pressure, hemodynamics, cardiovascular status and her neurologic status.  She is at increased risk for worsening respiratory, cardiovascular and renal system dysfunction.    Discussed patient condition and risk of morbidity and/or mortality with RN, RT, Pharmacy, Charge nurse / hot rounds and QA team     The patient remains critically ill.  Critical care time = 40 minutes in directly providing and coordinating critical care and extensive data review.  No time overlap and excludes procedures.    Eligio Del Castillo MD  Pulmonary and Critical Care Medicine

## 2021-12-08 NOTE — PROGRESS NOTES
Bronchoscopy Consent obtained; Dr Del Castillo explained procedure to Colin, patient's .    Patient given 50 mg of propofol, per Dr Del Castillo at bedside. Mechanical Ventilation in use.   Patient tolerated procedure well. VSS. Continue to monitor.  SaO2 91%, /59, HR 79

## 2021-12-08 NOTE — CARE PLAN
The patient is Unstable - High likelihood or risk of patient condition declining or worsening    Shift Goals  Clinical Goals: Hemodynamic Stability  Patient Goals: DONNA  Family Goals: DONNA    Progress made toward(s) clinical / shift goals:  Pain controlled using medicated drips; patient remains free from injury; Safety maintained.    Patient is not progressing towards the following goals:

## 2021-12-08 NOTE — PROCEDURES
Date of Procedure:  12/8/2021    Title of Procedure:  Diagnostic and therapeutic flexible fiberoptic bronchoscopy with bronchoalveolar lavage    Indication for Procedure:   Atelectasis    Post-procedure Diagnoses:    1.  Normal endobronchial anatomy  2.  No endobronchial tumor identified  3.  Moderate juicy white secretions in the distal trachea, left mainstem bronchus and right mainstem bronchus.    Narrative:    A time out was performed identifying the correct patient, correct procedure and correct location prior to this procedure.    The patient was sedated, intubated and ventilated at the time of this procedure.  The flexible fiberoptic bronchoscope was inserted through the lumen of the endotracheal tube and advanced into the distal trachea without difficulty.  The airways were examined to the subsegmental bronchus level bilaterally.    The endobronchial anatomy was normal.  No tumor was identified.    There was a moderate amount of juicy white secretions seen in the distal trachea, left mainstem bronchus and right mainstem bronchus.  I therapeutically suctioned these secretions.    Bronchoalveolar lavage was carried out in the basilar segments of the left lower lobe bronchi using the standard technique with good fluid return.    Bronchoalveolar lavage fluid from the left lower lobe is submitted to the laboratory for cytology, gram stain, culture and sensitivity, acid fast bacilli smear and culture and fungal culture.    The patient tolerated the procedure quite nicely.  No complications were apparent.  The heart rate and rhythm, blood pressure and oxygenation saturation were continuously monitored.      Eligio Del Castillo MD  Pulmonary and Critical Care Medicine

## 2021-12-08 NOTE — CARE PLAN
The patient is Watcher - Medium risk of patient condition declining or worsening    Shift Goals  Clinical Goals: SaO2 greater than 90  Patient Goals: DONNA  Family Goals: DONNA    Progress made toward(s) clinical / shift goals:  Sao2 on monitor 91 when saturation on ABG was 95.  SaO2 89-93    Patient is not progressing towards the following goals:      Problem: Skin Integrity  Goal: Skin integrity is maintained or improved  Outcome: Not Progressing  Note: Moisture associated dermatitis on buttocks, bleeding.  BMS inserted by super user       Problem: Pain - Standard  Goal: Alleviation of pain or a reduction in pain to the patient’s comfort goal  Outcome: Progressing  Note: CPOT 0 with Dilaudid gtt     Problem: Fall Risk  Goal: Patient will remain free from falls  Outcome: Progressing     Problem: Safety - Medical Restraint  Goal: Remains free of injury from restraints (Restraint for Interference with Medical Device)  Outcome: Progressing  Flowsheets (Taken 12/8/2021 0117)  Addressed this shift: Remains free of injury from restraints (restraint for interference with medical device): Every 2 hours: Monitor safety, psychosocial status, comfort, nutrition and hydration

## 2021-12-08 NOTE — CARE PLAN
Ventilator Daily Summary    Vent Day # 6    ETT 8.0 @ 22    Ventilator settings changed this shift:yes based on ABGs PEEP 10     10 60%    Weaning trials:no    Respiratory Procedures:no    Plan: Continue current ventilator settings and wean mechanical ventilation as tolerated per physician orders.

## 2021-12-08 NOTE — WOUND TEAM
"Wound consult placed on 12/7/21 for BMS placement. Confirmed \"Insert rectal tube\" in place. Confirmed Rectal tube placed by super-user and appropriate LDA opened. This RN confirmed/placed \"rectal tube care\" order. Wound consult then completed and pt on appropriate follow up lists.     "

## 2021-12-08 NOTE — PROGRESS NOTES
Pharmacy Vancomycin Kinetics Note for 12/8/2021     56 y.o. female on Vancomycin day # 7     Vancomycin Indication (Two level/Trough based Dosing): Skin/skin structure infection (goal trough 10-15)    Provider specified end date: 12/21/21    Active Antibiotics (From admission, onward)    Ordered     Ordering Provider       Wed Dec 8, 2021  1:46 PM    12/08/21 1346  vancomycin (VANCOCIN) 1,750 mg in  mL IVPB  (vancomycin (VANCOCIN) IV (LD + Maintenance))  ONCE        Note to Pharmacy: Per P&T Kinetics Protocol    Eligio Del Castillo M.D.       Fri Dec 3, 2021  4:25 PM    12/03/21 1625  cefTRIAXone (Rocephin) 2 g in  mL IVPB  EVERY 24 HOURS         Eligio Del Castillo M.D.       Fri Dec 3, 2021  9:08 AM    12/03/21 0908  MD Alert...Vancomycin per Pharmacy  PHARMACY TO DOSE        Question:  Indication(s) for vancomycin?  Answer:  Other (comments)    Eligio Del Castillo M.D.          Dosing Weight: 111 kg (244 lb 11.4 oz)      Admission History: Admitted on 12/1/2021 for PE (pulmonary thromboembolism) ([facility]) [I26.99]  Shock ([facility]) [R57.9]  Pertinent history: Patient with history of sinusitis followed by [facility] and has been on multiple courses of antibiotics.  Most recent culture with E coli and MRSA.  Vancomycin and ceftriaxone initiated.    Allergies:     Ancef [cefazolin], Bactrim [sulfamethoxazole w-trimethoprim], Bee venom, Buprenorphine, Clindamycin, Contrast media with iodine [iodine], Doxycycline, Econazole, Flagyl  [metronidazole], Floxin [ofloxacin], Gadolinium derivatives, Hydrocodone-acetaminophen, Iodine, Keflex, Levofloxacin, Morphine, Naloxone, Nitrofurantoin, Norco [apap-fd&c yellow #10 [place]-hydrocodone], Nyquil, Oxycodone, Paricalcitol, Penicillins, Tape, Tramadol, Vicks dayquil cold, Azithromycin, Bextra [valdecoxib], Linezolid, Adhesive remover [skin adhesives], Codeine, Sulfa drugs, and Tygacil [tigecycline]     Pertinent cultures to date:     Results     Procedure Component Value  "Units Date/Time    Quant Bronchial Washing [[id number]] Collected: 12/08/21 1015    Order Status: Completed Specimen: Respirate from Bronchoalveolar Lavage Updated: 12/08/21 1103    Narrative:      Collected By: [id number] ASHLEY DEJESUS  BAL from LLL  Collected By: [id number] SOLITARIOAMBER DEJESUS  Which Lobe (Bronch Only):->LLL    Fungal Culture - BAL [[id number]] Collected: 12/08/21 1015    Order Status: Completed Specimen: Respirate from Bronchoalveolar Lavage Updated: 12/08/21 1103    Narrative:      Collected By: [id number] ASHLEY DEJESUS  BAL from LLL  Collected By: [id number] SOLITARIOAMBER DEJESUS  Which Lobe (Bronch Only):->LLL    Fungal Smear - BAL [[id number]] Collected: 12/08/21 1015    Order Status: Completed Specimen: Respirate from Bronchoalveolar Lavage Updated: 12/08/21 1103    Narrative:      Collected By: [id number] ASHLEY DEJESUS  BAL from LLL  Collected By: [id number] ASHLEY DEJESUS  Which Lobe (Bronch Only):->LLL    AFB Culture - BAL [[id number]] Collected: 12/08/21 1015    Order Status: Sent Specimen: Respirate from Bronchoalveolar Lavage     Culture Respiratory W/ Grm Stn - BAL [[id number]] Collected: 12/08/21 1015    Order Status: Completed Specimen: Respirate from Bronchoalveolar Lavage     BLOOD CULTURE [[id number]] Collected: 12/01/21 2320    Order Status: Completed Specimen: Blood from Peripheral Updated: 12/07/21 0100     Significant Indicator NEG     Source BLD     Site PERIPHERAL     Culture Result No growth after 5 days of incubation.    Narrative:      2 of 2 blood culture x2  Sites order. Per Hospital Policy:  Only change Specimen Src: to \"Line\" if specified by physician  order.  Right AC    BLOOD CULTURE [[id number]] Collected: 12/01/21 1345    Order Status: Completed Specimen: Blood from Peripheral Updated: 12/06/21 1500     Significant Indicator NEG     Source BLD     Site PERIPHERAL     Culture Result No growth after 5 days of incubation.    Narrative:      1 of 2 for Blood " "Culture x 2 sites order. Per Hospital  Policy: Only change Specimen Src: to \"Line\" if specified by  physician order.  No site indicated    URINALYSIS CULTURE, IF INDICATED [[id number]]  (Abnormal) Collected: 12/01/21 1832    Order Status: Completed Specimen: Urine Updated: 12/01/21 1918     Color DK Yellow     Character Cloudy     Specific Gravity 1.030     Ph 5.0     Glucose Negative mg/dL      Ketones Negative mg/dL      Protein 30 mg/dL      Bilirubin Negative     Urobilinogen, Urine 0.2     Nitrite Negative     Leukocyte Esterase Trace     Occult Blood Trace     Micro Urine Req Microscopic    Narrative:      Indication for culture:->Patient WITHOUT an indwelling Perez  catheter in place with new onset of Dysuria, Frequency,  Urgency, and/or Suprapubic pain    URINALYSIS [[id number]]     Order Status: Canceled Specimen: Urine           Labs:     Estimated Creatinine Clearance: 36.7 mL/min (A) (by C-G formula based on SCr of 2.06 mg/dL (H)).  Recent Labs     12/06/21  0515 12/07/21  0340 12/08/21  0530   WBC 11.2* 9.1 10.1   NEUTSPOLYS 91.50* 91.10* 75.90*   BANDSSTABS 1.70  --  3.60     Recent Labs     12/06/21  0515 12/07/21  0340 12/08/21  0530   BUN 71* 56* 70*   CREATININE 3.50* 2.80* 2.06*   ALBUMIN 2.5* 2.7* 2.8*       Intake/Output Summary (Last 24 hours) at 12/8/2021 1354  Last data filed at 12/8/2021 1200  Gross per 24 hour   Intake 2605.03 ml   Output 1600 ml   Net 1005.03 ml      Blood Pressure 135/80   Pulse 93   Temperature 36.4 °C (97.5 °F)   Respiration (Abnormal) 22   Height 1.626 m (5' 4\")   Weight 111 kg (244 lb 14.9 oz)   Oxygen Saturation 92%  No data recorded.      List concerns for Vancomycin clearance:     BUN/Scr ratio greater than 20:1;Obesity    Pharmacokinetics:    Trough kinetics:   Recent Labs     12/08/21  0530   VANCORANDOM 21.9       A/P:     -  Vancomycin dose: 1750 mg IV x 1 (15 mg/kg pulse dose)    -  Next vancomycin level(s):    -12/10 Vr @ 0505    -  Comments: " Supra-therapeutic random level drawn 24 hours from last pulse dose of vancomycin.  UOP improving and SCr continues to improve.  A 15 mg/kg pulse dose has been scheduled for this evening.  Repeat level as above - or sooner if change in renal function.     Dannielle Soares, PharmD, BCPS, BCCCP

## 2021-12-08 NOTE — CARE PLAN
Problem: Safety - Medical Restraint  Goal: Remains free of injury from restraints (Restraint for Interference with Medical Device)  Outcome: Progressing     Problem: Safety - Medical Restraint  Goal: Free from restraint(s) (Restraint for Interference with Medical Device)  Outcome: Progressing     Problem: Fall Risk  Goal: Patient will remain free from falls  Outcome: Progressing     Problem: Pain - Standard  Goal: Alleviation of pain or a reduction in pain to the patient’s comfort goal  Outcome: Progressing   The patient is Unstable - High likelihood or risk of patient condition declining or worsening    Shift Goals  Clinical Goals: Oxygenation >90%  Patient Goals: Get better  Family Goals: Get better    Progress made toward(s) clinical / shift goals:  Patient remains free from falls; safety maintained. No signs of injury due to medical restraints. Pain level tolerable with Dilaudid drip in place. MD to consider adding other pain medications to reduce drip pain medications.    Patient is not progressing towards the following goals:

## 2021-12-08 NOTE — CARE PLAN
Problem: Ventilation  Goal: Ability to achieve and maintain unassisted ventilation or tolerate decreased levels of ventilator support  Description: Document on Vent flowsheet    1.  Support and monitor invasive and noninvasive mechanical ventilation  2.  Monitor ventilator weaning response  3.  Perform ventilator associated pneumonia prevention interventions  4.  Manage ventilation therapy by monitoring diagnostic test results  12/7/2021 1708 by Glendy Marks, RRT  Outcome: Progressing       Ventilator Daily Summary    Vent Day #5    Ventilator settings changed this shift:  Weaned FiO2 to 50% and PEEP to 8    Weaning trials: SBT x1--pt lasted 10 min before desats    Respiratory Procedures: None    Plan: Continue current ventilator settings and wean mechanical ventilation as tolerated per physician orders.

## 2021-12-08 NOTE — CARE PLAN
Problem: Ventilation  Goal: Ability to achieve and maintain unassisted ventilation or tolerate decreased levels of ventilator support  Description: Document on Vent flowsheet    1.  Support and monitor invasive and noninvasive mechanical ventilation  2.  Monitor ventilator weaning response  3.  Perform ventilator associated pneumonia prevention interventions  4.  Manage ventilation therapy by monitoring diagnostic test results  Outcome: Progressing   Ventilator Daily Summary    Vent Day # 2    Ventilator settings changed this shift: none     Weaning trials:  None, due to PEEP and FIO2.     Respiratory Procedures: Bronch which was unremarkable.     Plan: Continue current ventilator settings and wean mechanical ventilation as tolerated per physician orders.

## 2021-12-08 NOTE — PROGRESS NOTES
MD order and indication verified. Rectal tone assessed.  Balloon inflated with 45 mL of water and patency assessed. BMS then flushed with 60 mL of water. Stool return present. Bedside RN educated regarding flushing BMS Qshift and need for BMS supersuser or wound care RN for balloon adjustments. Rectal tube order set in place.

## 2021-12-09 ENCOUNTER — APPOINTMENT (OUTPATIENT)
Dept: RADIOLOGY | Facility: MEDICAL CENTER | Age: 56
DRG: 981 | End: 2021-12-09
Attending: INTERNAL MEDICINE
Payer: MEDICARE

## 2021-12-09 LAB
ALBUMIN SERPL BCP-MCNC: 2.6 G/DL (ref 3.2–4.9)
ALBUMIN/GLOB SERPL: 1.1 G/DL
ALP SERPL-CCNC: 213 U/L (ref 30–99)
ALT SERPL-CCNC: 67 U/L (ref 2–50)
ANION GAP SERPL CALC-SCNC: 8 MMOL/L (ref 7–16)
ANISOCYTOSIS BLD QL SMEAR: ABNORMAL
AST SERPL-CCNC: 68 U/L (ref 12–45)
BASE EXCESS BLDA CALC-SCNC: 12 MMOL/L (ref -4–3)
BASOPHILS # BLD AUTO: 0 % (ref 0–1.8)
BASOPHILS # BLD: 0 K/UL (ref 0–0.12)
BILIRUB SERPL-MCNC: 0.5 MG/DL (ref 0.1–1.5)
BODY TEMPERATURE: ABNORMAL DEGREES
BUN SERPL-MCNC: 56 MG/DL (ref 8–22)
CA-I SERPL-SCNC: 1.1 MMOL/L (ref 1.1–1.3)
CALCIUM SERPL-MCNC: 8.3 MG/DL (ref 8.5–10.5)
CHLORIDE SERPL-SCNC: 107 MMOL/L (ref 96–112)
CO2 BLDA-SCNC: 37 MMOL/L (ref 20–33)
CO2 SERPL-SCNC: 33 MMOL/L (ref 20–33)
CREAT SERPL-MCNC: 1.14 MG/DL (ref 0.5–1.4)
DELSYS IDSYS: ABNORMAL
END TIDAL CARBON DIOXIDE IECO2: 41 MMHG
EOSINOPHIL # BLD AUTO: 0.12 K/UL (ref 0–0.51)
EOSINOPHIL NFR BLD: 0.9 % (ref 0–6.9)
ERYTHROCYTE [DISTWIDTH] IN BLOOD BY AUTOMATED COUNT: 75.1 FL (ref 35.9–50)
FUNGUS SPEC FUNGUS STN: NORMAL
GIANT PLATELETS BLD QL SMEAR: NORMAL
GLOBULIN SER CALC-MCNC: 2.3 G/DL (ref 1.9–3.5)
GLUCOSE BLD-MCNC: 213 MG/DL (ref 65–99)
GLUCOSE BLD-MCNC: 215 MG/DL (ref 65–99)
GLUCOSE BLD-MCNC: 216 MG/DL (ref 65–99)
GLUCOSE BLD-MCNC: 236 MG/DL (ref 65–99)
GLUCOSE BLD-MCNC: 239 MG/DL (ref 65–99)
GLUCOSE SERPL-MCNC: 236 MG/DL (ref 65–99)
GRAM STN SPEC: NORMAL
HCO3 BLDA-SCNC: 35.9 MMOL/L (ref 17–25)
HCT VFR BLD AUTO: 33.1 % (ref 37–47)
HGB BLD-MCNC: 10.1 G/DL (ref 12–16)
HOROWITZ INDEX BLDA+IHG-RTO: 118 MM[HG]
LG PLATELETS BLD QL SMEAR: NORMAL
LYMPHOCYTES # BLD AUTO: 0.58 K/UL (ref 1–4.8)
LYMPHOCYTES NFR BLD: 4.4 % (ref 22–41)
MAGNESIUM SERPL-MCNC: 1.8 MG/DL (ref 1.5–2.5)
MANUAL DIFF BLD: NORMAL
MCH RBC QN AUTO: 27 PG (ref 27–33)
MCHC RBC AUTO-ENTMCNC: 30.5 G/DL (ref 33.6–35)
MCV RBC AUTO: 88.5 FL (ref 81.4–97.8)
MICROCYTES BLD QL SMEAR: ABNORMAL
MODE IMODE: ABNORMAL
MONOCYTES # BLD AUTO: 0.12 K/UL (ref 0–0.85)
MONOCYTES NFR BLD AUTO: 0.9 % (ref 0–13.4)
MORPHOLOGY BLD-IMP: NORMAL
MYELOCYTES NFR BLD MANUAL: 0.9 %
NEUTROPHILS # BLD AUTO: 12.05 K/UL (ref 2–7.15)
NEUTROPHILS NFR BLD: 89.4 % (ref 44–72)
NEUTS BAND NFR BLD MANUAL: 2.6 % (ref 0–10)
NRBC # BLD AUTO: 0.02 K/UL
NRBC BLD-RTO: 0.2 /100 WBC
O2/TOTAL GAS SETTING VFR VENT: 50 %
OVALOCYTES BLD QL SMEAR: NORMAL
PCO2 BLDA: 43.7 MMHG (ref 26–37)
PCO2 TEMP ADJ BLDA: 43.2 MMHG (ref 26–37)
PEEP END EXPIRATORY PRESSURE IPEEP: 12 CMH20
PERCENT MINUTE VOLUME IPMV: 120
PH BLDA: 7.52 [PH] (ref 7.4–7.5)
PH TEMP ADJ BLDA: 7.53 [PH] (ref 7.4–7.5)
PHOSPHATE SERPL-MCNC: 2.5 MG/DL (ref 2.5–4.5)
PLATELET # BLD AUTO: 174 K/UL (ref 164–446)
PLATELET BLD QL SMEAR: NORMAL
PMV BLD AUTO: 12.7 FL (ref 9–12.9)
PO2 BLDA: 59 MMHG (ref 64–87)
PO2 TEMP ADJ BLDA: 58 MMHG (ref 64–87)
POIKILOCYTOSIS BLD QL SMEAR: NORMAL
POTASSIUM SERPL-SCNC: 3.6 MMOL/L (ref 3.6–5.5)
POTASSIUM SERPL-SCNC: 3.7 MMOL/L (ref 3.6–5.5)
POTASSIUM SERPL-SCNC: 3.8 MMOL/L (ref 3.6–5.5)
POTASSIUM SERPL-SCNC: 3.9 MMOL/L (ref 3.6–5.5)
PROMYELOCYTES NFR BLD MANUAL: 0.9 %
PROT SERPL-MCNC: 4.9 G/DL (ref 6–8.2)
RBC # BLD AUTO: 3.74 M/UL (ref 4.2–5.4)
RBC BLD AUTO: PRESENT
SAO2 % BLDA: 92 % (ref 93–99)
SCHISTOCYTES BLD QL SMEAR: NORMAL
SIGNIFICANT IND 70042: NORMAL
SIGNIFICANT IND 70042: NORMAL
SITE SITE: NORMAL
SITE SITE: NORMAL
SODIUM SERPL-SCNC: 148 MMOL/L (ref 135–145)
SOURCE SOURCE: NORMAL
SOURCE SOURCE: NORMAL
SPECIMEN DRAWN FROM PATIENT: ABNORMAL
TOXIC GRANULES BLD QL SMEAR: NORMAL
VANCOMYCIN SERPL-MCNC: 18.7 UG/ML
WBC # BLD AUTO: 13.1 K/UL (ref 4.8–10.8)

## 2021-12-09 PROCEDURE — 83735 ASSAY OF MAGNESIUM: CPT

## 2021-12-09 PROCEDURE — 99291 CRITICAL CARE FIRST HOUR: CPT | Performed by: INTERNAL MEDICINE

## 2021-12-09 PROCEDURE — 94003 VENT MGMT INPAT SUBQ DAY: CPT

## 2021-12-09 PROCEDURE — 700101 HCHG RX REV CODE 250: Performed by: STUDENT IN AN ORGANIZED HEALTH CARE EDUCATION/TRAINING PROGRAM

## 2021-12-09 PROCEDURE — 94799 UNLISTED PULMONARY SVC/PX: CPT

## 2021-12-09 PROCEDURE — 84100 ASSAY OF PHOSPHORUS: CPT

## 2021-12-09 PROCEDURE — 700105 HCHG RX REV CODE 258: Performed by: INTERNAL MEDICINE

## 2021-12-09 PROCEDURE — 700102 HCHG RX REV CODE 250 W/ 637 OVERRIDE(OP): Performed by: INTERNAL MEDICINE

## 2021-12-09 PROCEDURE — 82803 BLOOD GASES ANY COMBINATION: CPT

## 2021-12-09 PROCEDURE — 82962 GLUCOSE BLOOD TEST: CPT | Mod: 91

## 2021-12-09 PROCEDURE — 36600 WITHDRAWAL OF ARTERIAL BLOOD: CPT

## 2021-12-09 PROCEDURE — 80202 ASSAY OF VANCOMYCIN: CPT

## 2021-12-09 PROCEDURE — 85007 BL SMEAR W/DIFF WBC COUNT: CPT

## 2021-12-09 PROCEDURE — 82330 ASSAY OF CALCIUM: CPT

## 2021-12-09 PROCEDURE — 84132 ASSAY OF SERUM POTASSIUM: CPT | Mod: 91

## 2021-12-09 PROCEDURE — 71045 X-RAY EXAM CHEST 1 VIEW: CPT

## 2021-12-09 PROCEDURE — 770022 HCHG ROOM/CARE - ICU (200)

## 2021-12-09 PROCEDURE — 85027 COMPLETE CBC AUTOMATED: CPT

## 2021-12-09 PROCEDURE — 700101 HCHG RX REV CODE 250: Performed by: INTERNAL MEDICINE

## 2021-12-09 PROCEDURE — 700111 HCHG RX REV CODE 636 W/ 250 OVERRIDE (IP): Performed by: INTERNAL MEDICINE

## 2021-12-09 PROCEDURE — A9270 NON-COVERED ITEM OR SERVICE: HCPCS | Performed by: INTERNAL MEDICINE

## 2021-12-09 PROCEDURE — 80053 COMPREHEN METABOLIC PANEL: CPT

## 2021-12-09 PROCEDURE — 700105 HCHG RX REV CODE 258: Performed by: STUDENT IN AN ORGANIZED HEALTH CARE EDUCATION/TRAINING PROGRAM

## 2021-12-09 RX ORDER — POTASSIUM CHLORIDE 7.45 MG/ML
10 INJECTION INTRAVENOUS ONCE
Status: COMPLETED | OUTPATIENT
Start: 2021-12-09 | End: 2021-12-09

## 2021-12-09 RX ORDER — DEXMEDETOMIDINE HYDROCHLORIDE 4 UG/ML
.1-1.5 INJECTION, SOLUTION INTRAVENOUS CONTINUOUS
Status: DISCONTINUED | OUTPATIENT
Start: 2021-12-09 | End: 2021-12-13

## 2021-12-09 RX ORDER — MAGNESIUM SULFATE HEPTAHYDRATE 40 MG/ML
2 INJECTION, SOLUTION INTRAVENOUS ONCE
Status: COMPLETED | OUTPATIENT
Start: 2021-12-09 | End: 2021-12-09

## 2021-12-09 RX ORDER — POTASSIUM CHLORIDE 14.9 MG/ML
20 INJECTION INTRAVENOUS ONCE
Status: COMPLETED | OUTPATIENT
Start: 2021-12-09 | End: 2021-12-09

## 2021-12-09 RX ORDER — HYDROCORTISONE 20 MG/1
20 TABLET ORAL DAILY
Status: DISCONTINUED | OUTPATIENT
Start: 2021-12-10 | End: 2021-12-14

## 2021-12-09 RX ORDER — HYDROCORTISONE 20 MG/1
10 TABLET ORAL NIGHTLY
Status: DISCONTINUED | OUTPATIENT
Start: 2021-12-09 | End: 2021-12-14

## 2021-12-09 RX ADMIN — Medication 0.5 MG/HR: at 15:01

## 2021-12-09 RX ADMIN — DEXAMETHASONE 0.5 MG: 1 TABLET ORAL at 17:50

## 2021-12-09 RX ADMIN — DEXAMETHASONE 0.5 MG: 1 TABLET ORAL at 04:41

## 2021-12-09 RX ADMIN — DEXMEDETOMIDINE 0.4 MCG/KG/HR: 200 INJECTION, SOLUTION INTRAVENOUS at 13:38

## 2021-12-09 RX ADMIN — GABAPENTIN 200 MG: 100 CAPSULE ORAL at 04:36

## 2021-12-09 RX ADMIN — OXYCODONE 5 MG: 5 TABLET ORAL at 00:35

## 2021-12-09 RX ADMIN — DOXAZOSIN 2 MG: 2 TABLET ORAL at 04:35

## 2021-12-09 RX ADMIN — LEVOTHYROXINE SODIUM 75 MCG: 0.07 TABLET ORAL at 04:35

## 2021-12-09 RX ADMIN — AMILORIDE HYDROCLORIDE 5 MG: 5 TABLET ORAL at 04:40

## 2021-12-09 RX ADMIN — HYDROCORTISONE 10 MG: 20 TABLET ORAL at 17:50

## 2021-12-09 RX ADMIN — OXYCODONE HYDROCHLORIDE 10 MG: 10 TABLET ORAL at 10:48

## 2021-12-09 RX ADMIN — OXYCODONE HYDROCHLORIDE 10 MG: 10 TABLET ORAL at 19:38

## 2021-12-09 RX ADMIN — INSULIN HUMAN 5 UNITS: 100 INJECTION, SUSPENSION SUBCUTANEOUS at 09:12

## 2021-12-09 RX ADMIN — POTASSIUM CHLORIDE 20 MEQ: 14.9 INJECTION, SOLUTION INTRAVENOUS at 14:51

## 2021-12-09 RX ADMIN — LABETALOL HYDROCHLORIDE 20 MG: 5 INJECTION, SOLUTION INTRAVENOUS at 00:01

## 2021-12-09 RX ADMIN — FLUDROCORTISONE ACETATE 0.1 MG: 0.1 TABLET ORAL at 17:51

## 2021-12-09 RX ADMIN — LIOTHYRONINE SODIUM 25 MCG: 25 TABLET ORAL at 04:40

## 2021-12-09 RX ADMIN — OMEPRAZOLE 40 MG: KIT at 04:36

## 2021-12-09 RX ADMIN — POTASSIUM CHLORIDE 20 MEQ: 14.9 INJECTION, SOLUTION INTRAVENOUS at 01:09

## 2021-12-09 RX ADMIN — HYDROMORPHONE HYDROCHLORIDE 0.5 MG: 1 INJECTION, SOLUTION INTRAMUSCULAR; INTRAVENOUS; SUBCUTANEOUS at 12:50

## 2021-12-09 RX ADMIN — CEFTRIAXONE SODIUM 2 G: 2 INJECTION, POWDER, FOR SOLUTION INTRAMUSCULAR; INTRAVENOUS at 04:35

## 2021-12-09 RX ADMIN — HYDRALAZINE HYDROCHLORIDE 20 MG: 20 INJECTION INTRAMUSCULAR; INTRAVENOUS at 01:15

## 2021-12-09 RX ADMIN — DEXMEDETOMIDINE 0.2 MCG/KG/HR: 200 INJECTION, SOLUTION INTRAVENOUS at 04:11

## 2021-12-09 RX ADMIN — GABAPENTIN 200 MG: 100 CAPSULE ORAL at 22:04

## 2021-12-09 RX ADMIN — CARVEDILOL 25 MG: 25 TABLET, FILM COATED ORAL at 17:50

## 2021-12-09 RX ADMIN — INSULIN HUMAN 5 UNITS: 100 INJECTION, SUSPENSION SUBCUTANEOUS at 17:43

## 2021-12-09 RX ADMIN — MAGNESIUM SULFATE 2 G: 2 INJECTION INTRAVENOUS at 08:08

## 2021-12-09 RX ADMIN — POTASSIUM CHLORIDE 10 MEQ: 7.46 INJECTION, SOLUTION INTRAVENOUS at 06:46

## 2021-12-09 RX ADMIN — HYDROCORTISONE 20 MG: 20 TABLET ORAL at 04:35

## 2021-12-09 RX ADMIN — OXYCODONE 5 MG: 5 TABLET ORAL at 04:17

## 2021-12-09 RX ADMIN — DEXMEDETOMIDINE 0.4 MCG/KG/HR: 200 INJECTION, SOLUTION INTRAVENOUS at 22:16

## 2021-12-09 RX ADMIN — OXYCODONE HYDROCHLORIDE 10 MG: 10 TABLET ORAL at 15:34

## 2021-12-09 RX ADMIN — PROPOFOL 20 MCG/KG/MIN: 10 INJECTION, EMULSION INTRAVENOUS at 00:49

## 2021-12-09 RX ADMIN — GABAPENTIN 200 MG: 100 CAPSULE ORAL at 13:56

## 2021-12-09 RX ADMIN — FLUDROCORTISONE ACETATE 0.1 MG: 0.1 TABLET ORAL at 04:36

## 2021-12-09 RX ADMIN — OMEPRAZOLE 40 MG: KIT at 17:51

## 2021-12-09 RX ADMIN — CARVEDILOL 25 MG: 25 TABLET, FILM COATED ORAL at 04:35

## 2021-12-09 RX ADMIN — POTASSIUM CHLORIDE 10 MEQ: 7.46 INJECTION, SOLUTION INTRAVENOUS at 19:39

## 2021-12-09 RX ADMIN — LABETALOL HYDROCHLORIDE 20 MG: 5 INJECTION, SOLUTION INTRAVENOUS at 15:16

## 2021-12-09 ASSESSMENT — PAIN DESCRIPTION - PAIN TYPE
TYPE: ACUTE PAIN

## 2021-12-09 NOTE — PROGRESS NOTES
Trialysis catheter removed per MD order. Held pressure at puncture site for 10 minutes. Site cleansed with chlorhexidine prior to line removal. Sutures x2 removed, no pieces seen after removal. Covered with sterile guaze and tegaderm. VSS. No signs or symptoms of active bleeding.

## 2021-12-09 NOTE — CARE PLAN
The patient is Watcher - Medium risk of patient condition declining or worsening    Shift Goals  Clinical Goals: Oxygenation >90%  Patient Goals: Get better  Family Goals: Get better    Progress made toward(s) clinical / shift goals:  Patient passed SAT , SBT in process.  Problem: Pain - Standard  Goal: Alleviation of pain or a reduction in pain to the patient’s comfort goal  Outcome: Progressing  Patient being given PRN pain medications, able to request medication, nods head when asked if in pain.      Problem: Safety - Medical Restraint  Goal: Remains free of injury from restraints (Restraint for Interference with Medical Device)  Outcome: Progressing  Edema present (3+/4+ entire body)  no injury from restraints.       Patient is not progressing towards the following goals:

## 2021-12-09 NOTE — PROGRESS NOTES
Critical Care Progress Note    Date of admission  12/1/2021    Chief Complaint  56 y.o. female admitted 12/1/2021 with acute pancreatitis, pulmonary embolism.  She has a history of prior gastrointestinal bleeding from unknown source, MRSA sinusitis on outpatient antibiotics, [place]'s disease, GERD, primary hypertension, hemochromatosis, hypothyroidism, traumatic brain injury, myocardial infarction and fibromyalgia.    Hospital Course      12/2 -    vent day 1.  Trend hemoglobin and platelet count and transfuse blood products as required.  Full vent support.  Inhaled Flolan for RV dysfunction following cardiac arrest.  12/3 -    vent day 2.  Start bicarbonate drip for MARY LOU.  Titrating norepinephrine.  Wean off Flolan.  12/4 -    vent day 3.  Renal function worse.  Titrating norepinephrine.  12/5 -    vent day 4.  Blood pressure increased.  Change dexmedetomidine to propofol.  12/6 -    vent day 5.  Arrange for IVC filter placement.  Nephrology on board.  12/7 -    vent day 6.  IVC filter placed yesterday.  Renal function improving.  Taper hydrocortisone.  12/8 -    vent day 7.  Improving renal function.  Taper hydrocortisone.  12/9 -    vent day 8.  Start NPH.  Change propofol to dexmedetomidine.      Interval Problem Update  Reviewed last 24 hour events:      SR  Dex  Nods  UOP OK  TF OK  98.6  -996 mL in the last 24  +4455 mL since admit  Start a wee bit of free water  Replete magnesium and potassium  Start NPH, 5 units twice daily      Review of Systems  Review of Systems   Unable to perform ROS: Acuity of condition        Vital Signs for last 24 hours   Temp:  [36.5 °C (97.7 °F)-37 °C (98.6 °F)] 36.8 °C (98.2 °F)  Pulse:  [] 91  Resp:  [17-26] 23  BP: (107-179)/() 138/87  SpO2:  [90 %-97 %] 90 %    Hemodynamic parameters for last 24 hours       Respiratory Information for the last 24 hours  Vent Mode: ASV  PEEP/CPAP: 12  MAP: 15  Control VTE (exp VT): 264    Physical Exam   Physical  Exam  Constitutional:       Appearance: She is not diaphoretic.      Comments: On ventilator   HENT:      Head: Normocephalic.      Nose: Nose normal.      Mouth/Throat:      Mouth: Mucous membranes are moist.      Pharynx: Oropharynx is clear.   Eyes:      Conjunctiva/sclera: Conjunctivae normal.      Pupils: Pupils are equal, round, and reactive to light.   Cardiovascular:      Pulses: Normal pulses.      Comments: Sinus rhythm  Pulmonary:      Breath sounds: Rales (Few coarse crackles) present. No wheezing.   Abdominal:      General: There is no distension.      Tenderness: There is no abdominal tenderness.      Comments: Tolerating enteral tube feedings   Musculoskeletal:      Cervical back: Normal range of motion.      Right lower leg: Edema present.      Left lower leg: Edema present.      Comments: No clubbing or cyanosis   Skin:     General: Skin is warm.   Neurological:      Comments: Arouses, nods and follows         Medications  Current Facility-Administered Medications   Medication Dose Route Frequency Provider Last Rate Last Admin   • dexmedetomidine (PRECEDEX) 400 mcg/100mL NS premix infusion  0.1-1.5 mcg/kg/hr Intravenous Continuous Martha Mirza M.D. 11.1 mL/hr at 12/09/21 0736 0.4 mcg/kg/hr at 12/09/21 0736   • magnesium sulfate IVPB premix 2 g  2 g Intravenous Once Eligio Del Castillo M.D. 25 mL/hr at 12/09/21 0808 2 g at 12/09/21 0808   • insulin NPH (HumuLIN N,NovoLIN N) injection  5 Units Subcutaneous BID INSULIN Eligio Del Castillo M.D.       • hydrocortisone (CORTEF) tablet 20 mg  20 mg Enteral Tube BID Eligio Del Castillo M.D.   20 mg at 12/09/21 0435   • dexamethasone (DECADRON) tablet 0.5 mg  0.5 mg Enteral Tube Q12HRS Eligio Del Castillo M.D.   0.5 mg at 12/09/21 0441   • gabapentin (NEURONTIN) capsule 200 mg  200 mg Enteral Tube Q8HRS Eligio Del Castillo M.D.   200 mg at 12/09/21 0436   • oxyCODONE immediate-release (ROXICODONE) tablet 5 mg  5 mg Enteral Tube Q4HRS PRN  Eligio Del Castillo M.D.   5 mg at 12/09/21 0417    Or   • oxyCODONE immediate release (ROXICODONE) tablet 10 mg  10 mg Enteral Tube Q4HRS PRN Eligio Del Castillo M.D.       • HYDROmorphone (Dilaudid) injection 0.5-1 mg  0.5-1 mg Intravenous Q HOUR PRN Eligio Del Castillo M.D.       • carvedilol (COREG) tablet 25 mg  25 mg Enteral Tube BID Eligio Del Castillo M.D.   25 mg at 12/09/21 0435   • doxazosin (CARDURA) tablet 2 mg  2 mg Enteral Tube DAILY Eligio Del Castillo M.D.   2 mg at 12/09/21 0435   • AMILoride (MIDAMOR) tablet 5 mg  5 mg Enteral Tube Q DAY Eligio Del Castillo M.D.   5 mg at 12/09/21 0440   • K+ Scale: Goal of 4.5  1 Each Intravenous Q6HRS Eligio Del Castillo M.D.   1 Each at 12/09/21 0600   • hydrALAZINE (APRESOLINE) injection 20 mg  20 mg Intravenous Q4HRS PRN Eligio Del Castillo M.D.   20 mg at 12/09/21 0115   • insulin regular (HumuLIN R,NovoLIN R) injection  3-14 Units Subcutaneous Q6HRS Eligio Del Castillo M.D.   4 Units at 12/09/21 0540    And   • dextrose 50% (D50W) injection 50 mL  50 mL Intravenous Q15 MIN PRN Eligio Del Castillo M.D.       • liothyronine (CYTOMEL) tablet 25 mcg  25 mcg Enteral Tube QAM AC Eligio Del Castillo M.D.   25 mcg at 12/09/21 0440   • levothyroxine (SYNTHROID) tablet 75 mcg  75 mcg Enteral Tube AM ES Eligio Del Castillo M.D.   75 mcg at 12/09/21 0435   • omeprazole (FIRST-OMEPRAZOLE) 2 mg/mL oral susp 40 mg  40 mg Enteral Tube Q12HRS Eligio Del Castillo M.D.   40 mg at 12/09/21 0436   • fludrocortisone (FLORINEF) tablet 0.1 mg  0.1 mg Enteral Tube BID Eligio Del Castillo M.D.   0.1 mg at 12/09/21 0436   • Pharmacy Consult: Enteral tube insertion - review meds/change route/product selection  1 Each Other PHARMACY TO DOSE Eligio Del Castillo M.D.       • senna-docusate (PERICOLACE or SENOKOT S) 8.6-50 MG per tablet 2 Tablet  2 Tablet Enteral Tube BID Eligio Del Castillo M.D.   2 Tablet at 12/06/21 0508    And   •  polyethylene glycol/lytes (MIRALAX) PACKET 1 Packet  1 Packet Enteral Tube QDAY PRN Eligio Del Castillo M.D.        And   • magnesium hydroxide (MILK OF MAGNESIA) suspension 30 mL  30 mL Enteral Tube QDAY PRN Eligio Del Castillo M.D.        And   • bisacodyl (DULCOLAX) suppository 10 mg  10 mg Rectal QDAY PRN Eligio Del Castillo M.D.       • ondansetron (ZOFRAN ODT) dispertab 4 mg  4 mg Enteral Tube Q4HRS PRN Eligio Del Castillo M.D.       • labetalol (NORMODYNE/TRANDATE) injection 10-20 mg  10-20 mg Intravenous Q4HRS PRN Eligio Del Castillo M.D.   20 mg at 12/09/21 0001   • acetaminophen (TYLENOL) suppository 650 mg  650 mg Rectal Q4HRS PRN Martha Mirza M.D.   650 mg at 12/03/21 0250   • MD Alert...Vancomycin per Pharmacy   Other PHARMACY TO DOSE Eligio Del Castillo M.D.       • cefTRIAXone (Rocephin) 2 g in  mL IVPB  2 g Intravenous Q24HRS Eligio Del Castillo M.D.   Stopped at 12/09/21 0505   • Respiratory Therapy Consult   Nebulization Continuous RT Servando Rush M.D.       • MD Alert...ICU Electrolyte Replacement per Pharmacy   Other PHARMACY TO DOSE Servando Rush M.D.       • lidocaine (XYLOCAINE) 1 % injection 2 mL  2 mL Tracheal Tube Q30 MIN PRN Servando Rush M.D.       • HYDROmorphone (DILAUDID) 0.2 mg/mL in 50mL NS (Continuous Infusion)   Intravenous Continuous Eligio Del Csatillo M.D.   Paused at 12/09/21 0232   • Pharmacy Consult Request ...Pain Management Review 1 Each  1 Each Other PHARMACY TO DOSE David Yoder M.D.       • ondansetron (ZOFRAN) syringe/vial injection 4 mg  4 mg Intravenous Q4HRS PRN David Yoder M.D.       • promethazine (PHENERGAN) suppository 12.5-25 mg  12.5-25 mg Rectal Q4HRS PRN David Yoder M.D.           Fluids    Intake/Output Summary (Last 24 hours) at 12/9/2021 0839  Last data filed at 12/9/2021 0800  Gross per 24 hour   Intake 1553.43 ml   Output 2205 ml   Net -651.57 ml       Laboratory  Recent Labs     12/07/21  0122  12/08/21  0224 12/09/21  0312   ISTATAPH 7.568* 7.548* 7.523*   ISTATAPCO2 38.0* 42.2* 43.7*   ISTATAPO2 75 68 59*   ISTATATCO2 36* 38* 37*   [id number] 97 95 92*   ISTATARTHCO3 34.7* 36.7* 35.9*   ISTATARTBE 12* 13* 12*   ISTATTEMP 36.4 C 36.0 C 36.7 C   ISTATFIO2 70 60 50   ISTATSPEC Arterial Arterial Arterial   ISTATAPHTC 7.577* 7.564* 7.527*   [id number] 72 64 58*         Recent Labs     12/07/21 0340 12/07/21  1035 12/08/21  0530 12/08/21  1130 12/08/21  1645 12/08/21  2355 12/09/21  0535   SODIUM 148*  --  149*  --   --   --  148*   POTASSIUM 2.7*   < > 3.2*   < > 3.5* 3.6 3.8   CHLORIDE 102  --  104  --   --   --  107   CO2 31  --  34*  --   --   --  33   BUN 56*  --  70*  --   --   --  56*   CREATININE 2.80*  --  2.06*  --   --   --  1.14   MAGNESIUM 2.3  --  2.1  --   --   --  1.8   PHOSPHORUS 6.6*  --  4.2  --   --   --  2.5   CALCIUM 8.3*  --  8.6  --   --   --  8.3*    < > = values in this interval not displayed.     Recent Labs     12/06/21  0845 12/07/21 0340 12/08/21  0530 12/09/21  0535   ALTSGPT  --  69* 68* 67*   ASTSGOT  --  74* 72* 68*   ALKPHOSPHAT  --  196* 235* 213*   TBILIRUBIN  --  0.7 0.5 0.5   LIPASE  --  16  --   --    PREALBUMIN 9.4*  --   --   --    GLUCOSE  --  236* 209* 236*     Recent Labs     12/07/21 0340 12/08/21  0530 12/09/21  0535   WBC 9.1 10.1 13.1*   NEUTSPOLYS 91.10* 75.90* 89.40*   LYMPHOCYTES 2.70* 4.40* 4.40*   MONOCYTES 6.20 12.50 0.90   EOSINOPHILS 0.00 1.80 0.90   BASOPHILS 0.00 0.00 0.00   ASTSGOT 74* 72* 68*   ALTSGPT 69* 68* 67*   ALKPHOSPHAT 196* 235* 213*   TBILIRUBIN 0.7 0.5 0.5     Recent Labs     12/07/21  0340 12/08/21  0530 12/09/21  0535   RBC 4.00* 4.06* 3.74*   HEMOGLOBIN 10.7* 10.7* 10.1*   HEMATOCRIT 33.6* 35.6* 33.1*   PLATELETCT 82* 123* 174       Imaging  X-Ray:  I have personally reviewed the images and compared with prior images. and My impression is: Unchanged left lower lobe opacities    Assessment/Plan  * Cardiac arrest (HCC)  Assessment &  Plan  PEA, asystole and ventricular tachycardia in ED - precipitated by hemorrhagic shock  ROSC after 7 rounds of CPR, IV epinephrine, IV bicarbonate solution and massive transfusion protocol  Echo with RV dysfunction and RVSP of 50 mmHg    Gastrointestinal hemorrhage  Assessment & Plan  History of gastrointestinal hemorrhage with no source identified  Possible gastrointestinal hemorrhage provoked by IV heparin for pulmonary embolism - no gross blood noted in stool - positive for occult blood  PPI twice daily  Trend hemoglobin and transfuse PRBCs to keep hemoglobin greater than 7    Shock ([facility])- (present on admission)  Assessment & Plan  Hemorrhagic shock with acute gastrointestinal blood loss and vasodistributive shock  Shock has resolved    Acute respiratory failure with hypoxia ([facility])- (present on admission)  Assessment & Plan  Intubated 12/2  All of the appropriate ventilator bundles are in place  SAT/SBT as appropriate  Mobility level 2-3    Paroxysmal atrial fibrillation ([facility])  Assessment & Plan  Continue carvedilol, 25 mg twice daily  Optimize potassium and magnesium    Hemoperitoneum  Assessment & Plan  Surgery has evaluated  No indication for surgery at this time  Trend hemoglobin and transfuse PRBCs to keep hemoglobin greater than 7    DVT (deep venous thrombosis) ([facility])  Assessment & Plan  Imaging reveals LUE DVT associated with her PICC line and a distal LLE DVT  Anticoagulation strictly contraindicated due to acute gastrointestinal hemorrhage and hemoperitoneum  IVC filter placement on 12/6    Acute pancreatitis  Assessment & Plan  Resolved  She is having bowel movements  She is tolerating enteral tube feedings    PE (pulmonary thromboembolism) ([facility])- (present on admission)  Assessment & Plan  Anticoagulation is contraindicated  IVC filter placement on 12/6    Acute blood loss anemia- (present on admission)  Assessment & Plan  Suspect gastrointestinal source of blood loss as well as acute  hemoperitoneum  Trend hemoglobin and transfuse PRBCs to keep hemoglobin greater than 7    Thrombocytopenia (HCC)- (present on admission)  Assessment & Plan  Trend platelet count and thromboelastogram with platelet mapping  Transfuse platelets as required    Adrenal insufficiency ([place] disease) (HCC)- (present on admission)  Assessment & Plan  Continue hydrocortisone to 20 mg twice daily  Continue Florinef, 0.1 mg twice daily    Elevated LFTs- (present on admission)  Assessment & Plan  Due to ischemic hepatopathy from cardiac arrest and shock  Trend enzymes and synthetic function  Avoid hepatotoxins    Acute kidney injury (HCC)- (present on admission)  Assessment & Plan  Suspect ATN due to shock in lady with underlying atrophic kidneys  Monitor renal function and urine output  Avoid nephrotoxins and renal dose medications  Improving renal function    Primary hypertension- (present on admission)  Assessment & Plan  Goal SBP less than 160  Hydralazine and labetalol as necessary to achieve blood pressure goals  Continue amiloride, 5 mg daily  Continue doxazosin, 2 mg daily  Continue carvedilol, 25 mg twice daily    Hypokalemia  Assessment & Plan  Replete potassium    Hypomagnesemia  Assessment & Plan  Replete magnesium    Hemochromatosis- (present on admission)  Assessment & Plan  History of    MRSA and E. coli sinusitis  Assessment & Plan  On outpatient IV antibiotics - LUE PICC in place  Continue vancomycin and ceftriaxone    Hypothyroidism- (present on admission)  Assessment & Plan  Continue levothyroxine and liothyronine    Obesity (BMI 35.0-39.9 without comorbidity)- (present on admission)  Assessment & Plan          VTE:  Contraindicated  Ulcer: PPI  Lines: Central Line  Ongoing indication addressed and Perez Catheter  Ongoing indication addressed     I have performed a physical exam and reviewed and updated ROS and Plan today (12/9/2021). In review of yesterday's note (12/8/2021), there are no changes except  as documented above.     I have assessed and reassessed her respiratory status with ventilator adjustments, airway mechanics, ventilator waveforms, blood pressure, hemodynamics, cardiovascular status as well as her neurologic status.  She is at increased risk for worsening respiratory and cardiovascular system dysfunction.    Discussed patient condition and risk of morbidity and/or mortality with RN, RT, Pharmacy, Charge nurse / hot rounds and QA team     The patient remains critically ill.  Critical care time = 35 minutes in directly providing and coordinating critical care and extensive data review.  No time overlap and excludes procedures.    Eligio Del Castillo MD  Pulmonary and Critical Care Medicine

## 2021-12-09 NOTE — CARE PLAN
Ventilator Daily Summary    Vent Day # 7    ETT 8.0 @ 22    Ventilator settings changed this shift:no     12 70%    Weaning trials:no    Respiratory Procedures:no    Plan: Continue current ventilator settings and wean mechanical ventilation as tolerated per physician orders.

## 2021-12-09 NOTE — PROGRESS NOTES
Pharmacy Vancomycin Kinetics Note for 12/9/2021     56 y.o. female on Vancomycin day # 8     Vancomycin Indication (Two level/Trough based Dosing): Skin/skin structure infection (goal trough 10-15)    Provider specified end date: 12/21/21    Active Antibiotics (From admission, onward)    Ordered     Ordering Provider       Fri Dec 3, 2021  4:25 PM    12/03/21 1625  cefTRIAXone (Rocephin) 2 g in  mL IVPB  EVERY 24 HOURS         Eligio Del Castillo M.D.       Fri Dec 3, 2021  9:08 AM    12/03/21 0908  MD Alert...Vancomycin per Pharmacy  PHARMACY TO DOSE        Question:  Indication(s) for vancomycin?  Answer:  Other (comments)    Eligio Del Castillo M.D.          Dosing Weight: 111 kg (244 lb 11.4 oz)      Admission History: Admitted on 12/1/2021 for PE (pulmonary thromboembolism) ([facility]) [I26.99]  Shock ([facility]) [R57.9]  Pertinent history: Patient with history of sinusitis followed by [facility] and has been on multiple courses of antibiotics.  Most recent culture with E coli and MRSA.  Vancomycin and ceftriaxone initiated.    Allergies:     Ancef [cefazolin], Bactrim [sulfamethoxazole w-trimethoprim], Bee venom, Buprenorphine, Clindamycin, Contrast media with iodine [iodine], Doxycycline, Econazole, Flagyl  [metronidazole], Floxin [ofloxacin], Gadolinium derivatives, Hydrocodone-acetaminophen, Iodine, Keflex, Levofloxacin, Morphine, Naloxone, Nitrofurantoin, Norco [apap-fd&c yellow #10 [place]-hydrocodone], Nyquil, Oxycodone, Paricalcitol, Penicillins, Tape, Tramadol, Vicks dayquil cold, Azithromycin, Bextra [valdecoxib], Linezolid, Adhesive remover [skin adhesives], Codeine, Sulfa drugs, and Tygacil [tigecycline]     Pertinent cultures to date:     Results     Procedure Component Value Units Date/Time    AFB Culture - BAL [[id number]] Collected: 12/08/21 1015    Order Status: Completed Specimen: Respirate from Bronchoalveolar Lavage Updated: 12/09/21 0713    Narrative:      Collected By: [id number] ASHLEY MICHELE  P  BAL from LLL  Collected By: [id number] SOLITARIOAMBER DEJESUS  Which Lobe (Bronch Only):->LLL    GRAM STAIN [[id number]] Collected: 12/08/21 1015    Order Status: Completed Specimen: Respirate Updated: 12/09/21 0641     Significant Indicator .     Source RESP     Site BRONCHOALVEOLAR LAVAGE     Gram Stain Result Moderate WBCs.  Rare Yeast.      Narrative:      Collected By: [id number] SOLITARIOAMBER DEJESUS  BAL from LLL  Collected By: [id number] SOLITARIOAMBER DEJESUS  Which Lobe (Bronch Only):->LLL  Collected By: [id number] SOLITARIOAMBER DEJESUS    Fungal Culture - BAL [[id number]] Collected: 12/08/21 1015    Order Status: Completed Specimen: Respirate from Bronchoalveolar Lavage Updated: 12/09/21 0641     Significant Indicator NEG     Source RESP     Site BRONCHOALVEOLAR LAVAGE     Culture Result -    Narrative:      Collected By: [id number] SOLITARIOAMBER DEJESUS  BAL from LLL  Collected By: [id number] SOLITARIOAMBER DEJESUS  Which Lobe (Bronch Only):->LLL  Collected By: [id number] SOLITARIOAMBER DEJESUS    Fungal Smear - BAL [[id number]] Collected: 12/08/21 1015    Order Status: Completed Specimen: Respirate from Bronchoalveolar Lavage Updated: 12/09/21 0641     Significant Indicator NEG     Source RESP     Site BRONCHOALVEOLAR LAVAGE     Fungal Smear Results -    Narrative:      Collected By: [id number] SOLITARIOAMBER DEJESUS  BAL from LLL  Collected By: [id number] SOLITARIOAMBER DEJESUS  Which Lobe (Bronch Only):->LLL  Collected By: [id number] SOLITARIOAMBER DEJESUS    Quant Bronchial Washing [[id number]] Collected: 12/08/21 1015    Order Status: Completed Specimen: Respirate from Bronchoalveolar Lavage Updated: 12/09/21 0641     Significant Indicator NEG     Source RESP     Site BRONCHOALVEOLAR LAVAGE     Culture Result -     Gram Stain Result Moderate WBCs.  Rare Yeast.      Narrative:      Collected By: [id number] SOLITARIOAMBER DEJESUS  BAL from LLL  Collected By: [id number] SOLITARIOAMBER DEJESUS  Which Lobe (Bronch Only):->LLL  Collected By: [id number] SOLITARIO  "LENY DEJESUS    Culture Respiratory W/ Grm Stn - BAL [[id number]] Collected: 12/08/21 1015    Order Status: Canceled Specimen: Other from Bronchoalveolar Lavage     BLOOD CULTURE [[id number]] Collected: 12/01/21 2320    Order Status: Completed Specimen: Blood from Peripheral Updated: 12/07/21 0100     Significant Indicator NEG     Source BLD     Site PERIPHERAL     Culture Result No growth after 5 days of incubation.    Narrative:      2 of 2 blood culture x2  Sites order. Per Hospital Policy:  Only change Specimen Src: to \"Line\" if specified by physician  order.  Right AC    BLOOD CULTURE [[id number]] Collected: 12/01/21 1345    Order Status: Completed Specimen: Blood from Peripheral Updated: 12/06/21 1500     Significant Indicator NEG     Source BLD     Site PERIPHERAL     Culture Result No growth after 5 days of incubation.    Narrative:      1 of 2 for Blood Culture x 2 sites order. Per Hospital  Policy: Only change Specimen Src: to \"Line\" if specified by  physician order.  No site indicated    URINALYSIS CULTURE, IF INDICATED [[id number]]  (Abnormal) Collected: 12/01/21 1832    Order Status: Completed Specimen: Urine Updated: 12/01/21 1918     Color DK Yellow     Character Cloudy     Specific Gravity 1.030     Ph 5.0     Glucose Negative mg/dL      Ketones Negative mg/dL      Protein 30 mg/dL      Bilirubin Negative     Urobilinogen, Urine 0.2     Nitrite Negative     Leukocyte Esterase Trace     Occult Blood Trace     Micro Urine Req Microscopic    Narrative:      Indication for culture:->Patient WITHOUT an indwelling Perez  catheter in place with new onset of Dysuria, Frequency,  Urgency, and/or Suprapubic pain    URINALYSIS [[id number]]     Order Status: Canceled Specimen: Urine           Labs:     Estimated Creatinine Clearance: 66.4 mL/min (by C-G formula based on SCr of 1.14 mg/dL).  Recent Labs     12/07/21  0340 12/08/21  0530 12/09/21  0535   WBC 9.1 10.1 13.1*   NEUTSPOLYS 91.10* 75.90* 89.40*   BANDSSTABS  --  " "3.60 2.60     Recent Labs     21  0340 21  0530 21  0535   BUN 56* 70* 56*   CREATININE 2.80* 2.06* 1.14   ALBUMIN 2.7* 2.8* 2.6*       Intake/Output Summary (Last 24 hours) at 2021 1155  Last data filed at 2021 1000  Gross per 24 hour   Intake 1682.3 ml   Output 2265 ml   Net -582.7 ml      Blood Pressure 141/96   Pulse 94   Temperature 36.8 °C (98.2 °F) (Bladder)   Respiration 18   Height 1.626 m (5' 4\")   Weight 111 kg (244 lb 14.9 oz)   Oxygen Saturation 92%  Temp (24hrs), Av.7 °C (98.1 °F), Min:36.5 °C (97.7 °F), Max:37 °C (98.6 °F)      List concerns for Vancomycin clearance:     BUN/Scr ratio greater than 20:1;Obesity    Pharmacokinetics:     Trough kinetics:   Recent Labs     21  0530   VANCORANDOM 21.9       A/P:     -  Vancomycin dose: on hold pending level this evening     -  Next vancomycin level(s):    - Vr @     -  Comments: Pulse dose of vancomycin administered last night.  No new level to assess yet today.  Given improvement to renal indices, I have ordered a random level this evening ~23 hours after last pulse dose to evaluate.  Baseline SCr seems to fluctuate between ~0.5-0.8, recommend cautious initiation of scheduled dosing in setting of recently recovered MARY LOU.      Dannielle Soares, PharmD, BCPS, BCCCP    "

## 2021-12-09 NOTE — FLOWSHEET NOTE
HR:  first degree block, rare ectopy.  BP >160 for several checks, required PRN labetelol and hydralazine in early am. AM meds pulled early for BP control

## 2021-12-10 ENCOUNTER — APPOINTMENT (OUTPATIENT)
Dept: RADIOLOGY | Facility: MEDICAL CENTER | Age: 56
DRG: 981 | End: 2021-12-10
Attending: INTERNAL MEDICINE
Payer: MEDICARE

## 2021-12-10 LAB
ALBUMIN SERPL BCP-MCNC: 2.2 G/DL (ref 3.2–4.9)
ALBUMIN/GLOB SERPL: 0.9 G/DL
ALP SERPL-CCNC: 242 U/L (ref 30–99)
ALT SERPL-CCNC: 85 U/L (ref 2–50)
ANION GAP SERPL CALC-SCNC: 6 MMOL/L (ref 7–16)
ANISOCYTOSIS BLD QL SMEAR: ABNORMAL
AST SERPL-CCNC: 109 U/L (ref 12–45)
BACTERIA BRONCH AEROBE CULT: NORMAL
BASE EXCESS BLDA CALC-SCNC: 13 MMOL/L (ref -4–3)
BASOPHILS # BLD AUTO: 0 % (ref 0–1.8)
BASOPHILS # BLD: 0 K/UL (ref 0–0.12)
BILIRUB SERPL-MCNC: 0.6 MG/DL (ref 0.1–1.5)
BODY TEMPERATURE: ABNORMAL DEGREES
BUN SERPL-MCNC: 44 MG/DL (ref 8–22)
CALCIUM SERPL-MCNC: 8.3 MG/DL (ref 8.5–10.5)
CHLORIDE SERPL-SCNC: 111 MMOL/L (ref 96–112)
CO2 BLDA-SCNC: 39 MMOL/L (ref 20–33)
CO2 SERPL-SCNC: 33 MMOL/L (ref 20–33)
CREAT SERPL-MCNC: 0.73 MG/DL (ref 0.5–1.4)
DELSYS IDSYS: ABNORMAL
END TIDAL CARBON DIOXIDE IECO2: 45 MMHG
EOSINOPHIL # BLD AUTO: 0.43 K/UL (ref 0–0.51)
EOSINOPHIL NFR BLD: 2.7 % (ref 0–6.9)
ERYTHROCYTE [DISTWIDTH] IN BLOOD BY AUTOMATED COUNT: 74.8 FL (ref 35.9–50)
GLOBULIN SER CALC-MCNC: 2.5 G/DL (ref 1.9–3.5)
GLUCOSE BLD-MCNC: 157 MG/DL (ref 65–99)
GLUCOSE BLD-MCNC: 189 MG/DL (ref 65–99)
GLUCOSE BLD-MCNC: 196 MG/DL (ref 65–99)
GLUCOSE BLD-MCNC: 208 MG/DL (ref 65–99)
GLUCOSE SERPL-MCNC: 172 MG/DL (ref 65–99)
GRAM STN SPEC: NORMAL
HCO3 BLDA-SCNC: 37.4 MMOL/L (ref 17–25)
HCT VFR BLD AUTO: 32.1 % (ref 37–47)
HGB BLD-MCNC: 9.6 G/DL (ref 12–16)
HOROWITZ INDEX BLDA+IHG-RTO: 157 MM[HG]
HYPOCHROMIA BLD QL SMEAR: ABNORMAL
LG PLATELETS BLD QL SMEAR: NORMAL
LYMPHOCYTES # BLD AUTO: 0.29 K/UL (ref 1–4.8)
LYMPHOCYTES NFR BLD: 1.8 % (ref 22–41)
MACROCYTES BLD QL SMEAR: ABNORMAL
MAGNESIUM SERPL-MCNC: 1.8 MG/DL (ref 1.5–2.5)
MANUAL DIFF BLD: NORMAL
MCH RBC QN AUTO: 26.5 PG (ref 27–33)
MCHC RBC AUTO-ENTMCNC: 29.9 G/DL (ref 33.6–35)
MCV RBC AUTO: 88.7 FL (ref 81.4–97.8)
MICROCYTES BLD QL SMEAR: ABNORMAL
MODE IMODE: ABNORMAL
MONOCYTES # BLD AUTO: 0 K/UL (ref 0–0.85)
MONOCYTES NFR BLD AUTO: 0 % (ref 0–13.4)
MORPHOLOGY BLD-IMP: NORMAL
MYELOCYTES NFR BLD MANUAL: 1.8 %
NEUTROPHILS # BLD AUTO: 14.61 K/UL (ref 2–7.15)
NEUTROPHILS NFR BLD: 91.9 % (ref 44–72)
NRBC # BLD AUTO: 0 K/UL
NRBC BLD-RTO: 0 /100 WBC
O2/TOTAL GAS SETTING VFR VENT: 60 %
PCO2 BLDA: 48.2 MMHG (ref 26–37)
PCO2 TEMP ADJ BLDA: 48.7 MMHG (ref 26–37)
PEEP END EXPIRATORY PRESSURE IPEEP: 8 CMH20
PERCENT MINUTE VOLUME IPMV: 120
PH BLDA: 7.5 [PH] (ref 7.4–7.5)
PH TEMP ADJ BLDA: 7.49 [PH] (ref 7.4–7.5)
PHOSPHATE SERPL-MCNC: 1.4 MG/DL (ref 2.5–4.5)
PLATELET # BLD AUTO: 199 K/UL (ref 164–446)
PLATELET BLD QL SMEAR: NORMAL
PMV BLD AUTO: 12 FL (ref 9–12.9)
PO2 BLDA: 94 MMHG (ref 64–87)
PO2 TEMP ADJ BLDA: 96 MMHG (ref 64–87)
POTASSIUM SERPL-SCNC: 4 MMOL/L (ref 3.6–5.5)
POTASSIUM SERPL-SCNC: 4 MMOL/L (ref 3.6–5.5)
PROMYELOCYTES NFR BLD MANUAL: 1.8 %
PROT SERPL-MCNC: 4.7 G/DL (ref 6–8.2)
RBC # BLD AUTO: 3.62 M/UL (ref 4.2–5.4)
RBC BLD AUTO: PRESENT
SAO2 % BLDA: 98 % (ref 93–99)
SIGNIFICANT IND 70042: NORMAL
SITE SITE: NORMAL
SODIUM SERPL-SCNC: 150 MMOL/L (ref 135–145)
SOURCE SOURCE: NORMAL
SPECIMEN DRAWN FROM PATIENT: ABNORMAL
TOXIC GRANULES BLD QL SMEAR: SLIGHT
WBC # BLD AUTO: 15.9 K/UL (ref 4.8–10.8)

## 2021-12-10 PROCEDURE — 94003 VENT MGMT INPAT SUBQ DAY: CPT

## 2021-12-10 PROCEDURE — 700101 HCHG RX REV CODE 250: Performed by: STUDENT IN AN ORGANIZED HEALTH CARE EDUCATION/TRAINING PROGRAM

## 2021-12-10 PROCEDURE — 700105 HCHG RX REV CODE 258: Performed by: INTERNAL MEDICINE

## 2021-12-10 PROCEDURE — 700101 HCHG RX REV CODE 250: Performed by: INTERNAL MEDICINE

## 2021-12-10 PROCEDURE — 71045 X-RAY EXAM CHEST 1 VIEW: CPT

## 2021-12-10 PROCEDURE — 83735 ASSAY OF MAGNESIUM: CPT

## 2021-12-10 PROCEDURE — 700111 HCHG RX REV CODE 636 W/ 250 OVERRIDE (IP): Performed by: STUDENT IN AN ORGANIZED HEALTH CARE EDUCATION/TRAINING PROGRAM

## 2021-12-10 PROCEDURE — 94150 VITAL CAPACITY TEST: CPT

## 2021-12-10 PROCEDURE — 85027 COMPLETE CBC AUTOMATED: CPT

## 2021-12-10 PROCEDURE — 85007 BL SMEAR W/DIFF WBC COUNT: CPT

## 2021-12-10 PROCEDURE — 770022 HCHG ROOM/CARE - ICU (200)

## 2021-12-10 PROCEDURE — 700111 HCHG RX REV CODE 636 W/ 250 OVERRIDE (IP): Performed by: INTERNAL MEDICINE

## 2021-12-10 PROCEDURE — 700102 HCHG RX REV CODE 250 W/ 637 OVERRIDE(OP): Performed by: INTERNAL MEDICINE

## 2021-12-10 PROCEDURE — 82803 BLOOD GASES ANY COMBINATION: CPT

## 2021-12-10 PROCEDURE — A9270 NON-COVERED ITEM OR SERVICE: HCPCS | Performed by: INTERNAL MEDICINE

## 2021-12-10 PROCEDURE — 80053 COMPREHEN METABOLIC PANEL: CPT

## 2021-12-10 PROCEDURE — 36600 WITHDRAWAL OF ARTERIAL BLOOD: CPT

## 2021-12-10 PROCEDURE — 84132 ASSAY OF SERUM POTASSIUM: CPT

## 2021-12-10 PROCEDURE — 700105 HCHG RX REV CODE 258: Performed by: STUDENT IN AN ORGANIZED HEALTH CARE EDUCATION/TRAINING PROGRAM

## 2021-12-10 PROCEDURE — 82962 GLUCOSE BLOOD TEST: CPT | Mod: 91

## 2021-12-10 PROCEDURE — 94799 UNLISTED PULMONARY SVC/PX: CPT

## 2021-12-10 PROCEDURE — 99291 CRITICAL CARE FIRST HOUR: CPT | Performed by: INTERNAL MEDICINE

## 2021-12-10 PROCEDURE — 84100 ASSAY OF PHOSPHORUS: CPT

## 2021-12-10 RX ORDER — HEPARIN SODIUM 5000 [USP'U]/ML
5000 INJECTION, SOLUTION INTRAVENOUS; SUBCUTANEOUS EVERY 8 HOURS
Status: DISCONTINUED | OUTPATIENT
Start: 2021-12-10 | End: 2021-12-13

## 2021-12-10 RX ORDER — POTASSIUM CHLORIDE 7.45 MG/ML
10 INJECTION INTRAVENOUS ONCE
Status: COMPLETED | OUTPATIENT
Start: 2021-12-10 | End: 2021-12-10

## 2021-12-10 RX ORDER — FUROSEMIDE 10 MG/ML
20 INJECTION INTRAMUSCULAR; INTRAVENOUS
Status: DISCONTINUED | OUTPATIENT
Start: 2021-12-10 | End: 2021-12-15

## 2021-12-10 RX ORDER — MAGNESIUM SULFATE HEPTAHYDRATE 40 MG/ML
2 INJECTION, SOLUTION INTRAVENOUS ONCE
Status: COMPLETED | OUTPATIENT
Start: 2021-12-10 | End: 2021-12-10

## 2021-12-10 RX ADMIN — INSULIN HUMAN 5 UNITS: 100 INJECTION, SUSPENSION SUBCUTANEOUS at 17:39

## 2021-12-10 RX ADMIN — OMEPRAZOLE 40 MG: KIT at 17:30

## 2021-12-10 RX ADMIN — FUROSEMIDE 20 MG: 10 INJECTION, SOLUTION INTRAMUSCULAR; INTRAVENOUS at 23:28

## 2021-12-10 RX ADMIN — DEXMEDETOMIDINE 0.4 MCG/KG/HR: 200 INJECTION, SOLUTION INTRAVENOUS at 11:41

## 2021-12-10 RX ADMIN — OXYCODONE HYDROCHLORIDE 10 MG: 10 TABLET ORAL at 21:39

## 2021-12-10 RX ADMIN — MAGNESIUM SULFATE HEPTAHYDRATE 2 G: 40 INJECTION, SOLUTION INTRAVENOUS at 08:29

## 2021-12-10 RX ADMIN — DOXAZOSIN 2 MG: 2 TABLET ORAL at 05:13

## 2021-12-10 RX ADMIN — FLUDROCORTISONE ACETATE 0.1 MG: 0.1 TABLET ORAL at 05:14

## 2021-12-10 RX ADMIN — HEPARIN SODIUM 5000 UNITS: 5000 INJECTION, SOLUTION INTRAVENOUS; SUBCUTANEOUS at 14:39

## 2021-12-10 RX ADMIN — OMEPRAZOLE 40 MG: KIT at 05:14

## 2021-12-10 RX ADMIN — FLUDROCORTISONE ACETATE 0.1 MG: 0.1 TABLET ORAL at 17:29

## 2021-12-10 RX ADMIN — GABAPENTIN 200 MG: 100 CAPSULE ORAL at 21:38

## 2021-12-10 RX ADMIN — OXYCODONE HYDROCHLORIDE 10 MG: 10 TABLET ORAL at 17:29

## 2021-12-10 RX ADMIN — LIOTHYRONINE SODIUM 25 MCG: 25 TABLET ORAL at 06:21

## 2021-12-10 RX ADMIN — VANCOMYCIN HYDROCHLORIDE 1750 MG: 500 INJECTION, POWDER, LYOPHILIZED, FOR SOLUTION INTRAVENOUS at 05:13

## 2021-12-10 RX ADMIN — HYDROCORTISONE 10 MG: 20 TABLET ORAL at 17:28

## 2021-12-10 RX ADMIN — OXYCODONE HYDROCHLORIDE 10 MG: 10 TABLET ORAL at 03:34

## 2021-12-10 RX ADMIN — DEXAMETHASONE 0.5 MG: 1 TABLET ORAL at 05:15

## 2021-12-10 RX ADMIN — GABAPENTIN 200 MG: 100 CAPSULE ORAL at 05:15

## 2021-12-10 RX ADMIN — DEXAMETHASONE 0.5 MG: 1 TABLET ORAL at 19:06

## 2021-12-10 RX ADMIN — Medication 0.25 MG/HR: at 08:29

## 2021-12-10 RX ADMIN — HEPARIN SODIUM 5000 UNITS: 5000 INJECTION, SOLUTION INTRAVENOUS; SUBCUTANEOUS at 09:58

## 2021-12-10 RX ADMIN — LEVOTHYROXINE SODIUM 75 MCG: 0.07 TABLET ORAL at 05:14

## 2021-12-10 RX ADMIN — GABAPENTIN 200 MG: 100 CAPSULE ORAL at 14:39

## 2021-12-10 RX ADMIN — CARVEDILOL 25 MG: 25 TABLET, FILM COATED ORAL at 17:28

## 2021-12-10 RX ADMIN — AMILORIDE HYDROCLORIDE 5 MG: 5 TABLET ORAL at 05:15

## 2021-12-10 RX ADMIN — CARVEDILOL 25 MG: 25 TABLET, FILM COATED ORAL at 05:14

## 2021-12-10 RX ADMIN — INSULIN HUMAN 5 UNITS: 100 INJECTION, SUSPENSION SUBCUTANEOUS at 06:22

## 2021-12-10 RX ADMIN — OXYCODONE HYDROCHLORIDE 10 MG: 10 TABLET ORAL at 11:05

## 2021-12-10 RX ADMIN — POTASSIUM PHOSPHATE, MONOBASIC AND POTASSIUM PHOSPHATE, DIBASIC 30 MMOL: 224; 236 INJECTION, SOLUTION, CONCENTRATE INTRAVENOUS at 09:58

## 2021-12-10 RX ADMIN — POTASSIUM CHLORIDE 10 MEQ: 7.46 INJECTION, SOLUTION INTRAVENOUS at 06:21

## 2021-12-10 RX ADMIN — CEFTRIAXONE SODIUM 2 G: 2 INJECTION, POWDER, FOR SOLUTION INTRAMUSCULAR; INTRAVENOUS at 05:13

## 2021-12-10 RX ADMIN — HYDROCORTISONE 20 MG: 20 TABLET ORAL at 05:30

## 2021-12-10 RX ADMIN — HYDROMORPHONE HYDROCHLORIDE 1 MG: 1 INJECTION, SOLUTION INTRAMUSCULAR; INTRAVENOUS; SUBCUTANEOUS at 20:11

## 2021-12-10 RX ADMIN — HEPARIN SODIUM 5000 UNITS: 5000 INJECTION, SOLUTION INTRAVENOUS; SUBCUTANEOUS at 21:38

## 2021-12-10 RX ADMIN — POTASSIUM CHLORIDE 10 MEQ: 7.46 INJECTION, SOLUTION INTRAVENOUS at 02:19

## 2021-12-10 ASSESSMENT — PAIN DESCRIPTION - PAIN TYPE
TYPE: ACUTE PAIN
TYPE: CHRONIC PAIN
TYPE: ACUTE PAIN

## 2021-12-10 ASSESSMENT — PULMONARY FUNCTION TESTS: FVC: .6

## 2021-12-10 NOTE — CARE PLAN
Ventilator Daily Summary    Vent Day # 8    ETT 8.0 @ 22    Ventilator settings changed this shift:yes based on ABGs     8 40% ( based on ABGs O2 94) then FiO2 60% because of  SpO2 89%.    Weaning trials:no    Respiratory Procedures:no    Plan: Continue current ventilator settings and wean mechanical ventilation as tolerated per physician orders.

## 2021-12-10 NOTE — DISCHARGE PLANNING
Anticipated Discharge Disposition: TBD    Action: RN BRITTANY attended IDT rounds and reviewed patient chart.  Patient remains intubated with PICC and cortrak in place.  Patient not medically cleared     Barriers:  Medical clearance; ICU level of care     Plan: HCM to continue to follow for discharge planning needs and barriers

## 2021-12-10 NOTE — CARE PLAN
The patient is Watcher - Medium risk of patient condition declining or worsening    Shift Goals  Clinical Goals: extubate  Patient Goals: improvement   Family Goals: improvement     Progress made toward(s) clinical / shift goals:    Problem: Pain - Standard  Goal: Alleviation of pain or a reduction in pain to the patient’s comfort goal  Outcome: Progressing     Problem: Knowledge Deficit - Standard  Goal: Patient and family/care givers will demonstrate understanding of plan of care, disease process/condition, diagnostic tests and medications  Outcome: Progressing     Problem: Skin Integrity  Goal: Skin integrity is maintained or improved  Outcome: Progressing     Problem: Fall Risk  Goal: Patient will remain free from falls  Outcome: Progressing     Problem: Safety - Medical Restraint  Goal: Remains free of injury from restraints (Restraint for Interference with Medical Device)  Outcome: Met       Patient is not progressing towards the following goals:

## 2021-12-10 NOTE — PROGRESS NOTES
Critical Care Progress Note    Date of admission  12/1/2021    Chief Complaint  56 y.o. female admitted 12/1/2021 with acute pancreatitis, pulmonary embolism.  She has a history of prior gastrointestinal bleeding from unknown source, MRSA sinusitis on outpatient antibiotics, Juab's disease, GERD, primary hypertension, hemochromatosis, hypothyroidism, traumatic brain injury, myocardial infarction and fibromyalgia.    Hospital Course      12/2 -    vent day 1.  Trend hemoglobin and platelet count and transfuse blood products as required.  Full vent support.  Inhaled Flolan for RV dysfunction following cardiac arrest.  12/3 -    vent day 2.  Start bicarbonate drip for MARY LOU.  Titrating norepinephrine.  Wean off Flolan.  12/4 -    vent day 3.  Renal function worse.  Titrating norepinephrine.  12/5 -    vent day 4.  Blood pressure increased.  Change dexmedetomidine to propofol.  12/6 -    vent day 5.  Arrange for IVC filter placement.  Nephrology on board.  12/7 -    vent day 6.  IVC filter placed yesterday.  Renal function improving.  Taper hydrocortisone.  12/8 -    vent day 7.  Improving renal function.  Taper hydrocortisone.  12/9 -    vent day 8.  Start NPH.  Change propofol to dexmedetomidine.  12/10 - V#9, titrating norepinephrine-off, dexmedetomidine, FiO2 increased to 0.6, start lasix      Interval Problem Update  Reviewed last 24 hour events:    DEX 0.4  Follows and moves all 4  40->60%, PEEP 8  SBT x one hr - soft weans, RSBI 32  CXR BBSSLLA  I/Os neg 652cc/24 hrs - positive 3.8 L ARB  LFTs increasing  Sodium 150  BMS  Tm 99.5  C3/Vanco for sinusitis  Addisons Rx - home doses  PLT count normalized  LFTs mildly worse  Renal function improved    Replete phosphorus/Mg  Free H20 today via IV with electrolyte repletion (D5W)  Lasix  Start heparin PPx 5000 Q8     YESTERDAY  SR  Dex  Nods  UOP OK  TF OK  98.6  -996 mL in the last 24  +4455 mL since admit  Start a wee bit of free water  Replete magnesium and  potassium  Start NPH, 5 units twice daily      Review of Systems  Review of Systems   Unable to perform ROS: Acuity of condition        Vital Signs for last 24 hours   Temp:  [36.7 °C (98.1 °F)-37.5 °C (99.5 °F)] 37.3 °C (99.1 °F)  Pulse:  [] 101  Resp:  [13-28] 27  BP: (117-159)/() 149/93  SpO2:  [92 %-99 %] 96 %    Hemodynamic parameters for last 24 hours       Respiratory Information for the last 24 hours  Vent Mode: Spont  PEEP/CPAP: 8  MAP: 13  Length of Weaning Trial (Hours): 1.25  Control VTE (exp VT): 307    Physical Exam   Physical Exam  Constitutional:       Appearance: She is ill-appearing. She is not toxic-appearing or diaphoretic.      Comments: On ventilator   HENT:      Head: Normocephalic.      Nose: Nose normal.      Mouth/Throat:      Mouth: Mucous membranes are moist.      Pharynx: Oropharynx is clear.   Eyes:      General: No scleral icterus.     Conjunctiva/sclera: Conjunctivae normal.      Pupils: Pupils are equal, round, and reactive to light.   Cardiovascular:      Rate and Rhythm: Normal rate and regular rhythm.      Pulses: Normal pulses.      Heart sounds: No murmur heard.  No gallop.       Comments: SR  Pulmonary:      Breath sounds: Rales (Few coarse crackles) present. No wheezing.   Abdominal:      General: Bowel sounds are normal. There is no distension.      Palpations: Abdomen is soft.      Tenderness: There is no abdominal tenderness. There is no right CVA tenderness or left CVA tenderness.      Comments: Tympanetic, Tolerating enteral tube feedings   Genitourinary:     Comments: Perez  Musculoskeletal:      Cervical back: Normal range of motion.      Right lower leg: Edema present.      Left lower leg: Edema present.      Comments: No clubbing or cyanosis   Skin:     General: Skin is warm.      Capillary Refill: Capillary refill takes less than 2 seconds.   Neurological:      General: No focal deficit present.      Cranial Nerves: No cranial nerve deficit.       Comments: Arouses, nods and follows         Medications  Current Facility-Administered Medications   Medication Dose Route Frequency Provider Last Rate Last Admin   • potassium phosphate IVPB 30 mmol in 500 mL D5W (premix)  30 mmol Intravenous Once Alber Junior M.D. 83.3 mL/hr at 12/10/21 0958 30 mmol at 12/10/21 0958   • heparin injection 5,000 Units  5,000 Units Subcutaneous Q8HRS Alber Junior M.D.   5,000 Units at 12/10/21 1439   • dexmedetomidine (PRECEDEX) 400 mcg/100mL NS premix infusion  0.1-1.5 mcg/kg/hr Intravenous Continuous Martha Mirza M.D.   Paused at 12/10/21 1400   • insulin NPH (HumuLIN N,NovoLIN N) injection  5 Units Subcutaneous BID INSULIN Eligio Del Castillo M.D.   5 Units at 12/10/21 0622   • hydrocortisone (CORTEF) tablet 20 mg  20 mg Enteral Tube DAILY Eligio Del Castillo M.D.   20 mg at 12/10/21 0530    And   • hydrocortisone (CORTEF) tablet 10 mg  10 mg Enteral Tube Nightly Eligio Del Castillo M.D.   10 mg at 12/09/21 1750   • dexamethasone (DECADRON) tablet 0.5 mg  0.5 mg Enteral Tube Q12HRS Eligio Del Castillo M.D.   0.5 mg at 12/10/21 0515   • gabapentin (NEURONTIN) capsule 200 mg  200 mg Enteral Tube Q8HRS Eligio Del Castillo M.D.   200 mg at 12/10/21 1439   • oxyCODONE immediate-release (ROXICODONE) tablet 5 mg  5 mg Enteral Tube Q4HRS PRN Eligio Del Castillo M.D.   5 mg at 12/09/21 0417    Or   • oxyCODONE immediate release (ROXICODONE) tablet 10 mg  10 mg Enteral Tube Q4HRS PRN Eligio Del Castillo M.D.   10 mg at 12/10/21 1105   • HYDROmorphone (Dilaudid) injection 0.5-1 mg  0.5-1 mg Intravenous Q HOUR PRN Eligio Del Castillo M.D.   0.5 mg at 12/09/21 1250   • carvedilol (COREG) tablet 25 mg  25 mg Enteral Tube BID Eligio Del Castillo M.D.   25 mg at 12/10/21 0514   • doxazosin (CARDURA) tablet 2 mg  2 mg Enteral Tube DAILY Eligio Del Castillo M.D.   2 mg at 12/10/21 0513   • AMILoride (MIDAMOR) tablet 5 mg  5 mg Enteral Tube Q DAY Eligio  ANJELICA Del Castillo M.D.   5 mg at 12/10/21 0515   • hydrALAZINE (APRESOLINE) injection 20 mg  20 mg Intravenous Q4HRS PRN Eligio Del Castillo M.D.   20 mg at 12/09/21 0115   • insulin regular (HumuLIN R,NovoLIN R) injection  3-14 Units Subcutaneous Q6HRS Eligio Del Castillo M.D.   3 Units at 12/10/21 1241    And   • dextrose 50% (D50W) injection 50 mL  50 mL Intravenous Q15 MIN PRN Eligio Del Castillo M.D.       • liothyronine (CYTOMEL) tablet 25 mcg  25 mcg Enteral Tube QAM AC Eligio Del Castillo M.D.   25 mcg at 12/10/21 0621   • levothyroxine (SYNTHROID) tablet 75 mcg  75 mcg Enteral Tube AM ES Eligio Del Castillo M.D.   75 mcg at 12/10/21 0514   • omeprazole (FIRST-OMEPRAZOLE) 2 mg/mL oral susp 40 mg  40 mg Enteral Tube Q12HRS Eligio Del Castillo M.D.   40 mg at 12/10/21 0514   • fludrocortisone (FLORINEF) tablet 0.1 mg  0.1 mg Enteral Tube BID Eligio Del Castillo M.D.   0.1 mg at 12/10/21 0514   • Pharmacy Consult: Enteral tube insertion - review meds/change route/product selection  1 Each Other PHARMACY TO DOSE Eligio Del Castillo M.D.       • senna-docusate (PERICOLACE or SENOKOT S) 8.6-50 MG per tablet 2 Tablet  2 Tablet Enteral Tube BID Eligio Del Castillo M.D.   2 Tablet at 12/06/21 0508    And   • polyethylene glycol/lytes (MIRALAX) PACKET 1 Packet  1 Packet Enteral Tube QDAY PRN Eligio Del Castillo M.D.        And   • magnesium hydroxide (MILK OF MAGNESIA) suspension 30 mL  30 mL Enteral Tube QDAY PRN Eligio Del Castillo M.D.        And   • bisacodyl (DULCOLAX) suppository 10 mg  10 mg Rectal QDAY PRN Eligio Del Castillo M.D.       • ondansetron (ZOFRAN ODT) dispertab 4 mg  4 mg Enteral Tube Q4HRS PRN Eligio Del Castillo M.D.       • labetalol (NORMODYNE/TRANDATE) injection 10-20 mg  10-20 mg Intravenous Q4HRS PRN Eligio Del Castillo M.D.   20 mg at 12/09/21 1516   • acetaminophen (TYLENOL) suppository 650 mg  650 mg Rectal Q4HRS PRN Martha Mirza M.D.   650 mg  at 12/03/21 0250   • MD Alert...Vancomycin per Pharmacy   Other PHARMACY TO DOSE Eligio Del Castillo M.D.       • cefTRIAXone (Rocephin) 2 g in  mL IVPB  2 g Intravenous Q24HRS Eligio Del Castillo M.D.   Stopped at 12/10/21 0543   • Respiratory Therapy Consult   Nebulization Continuous RT Servando Rush M.D.       • MD Alert...ICU Electrolyte Replacement per Pharmacy   Other PHARMACY TO DOSE Servando Rush M.D.       • lidocaine (XYLOCAINE) 1 % injection 2 mL  2 mL Tracheal Tube Q30 MIN PRN Servando Rush M.D.       • HYDROmorphone (DILAUDID) 0.2 mg/mL in 50mL NS (Continuous Infusion)   Intravenous Continuous Eligio Del Castillo M.D.   Paused at 12/10/21 1400   • Pharmacy Consult Request ...Pain Management Review 1 Each  1 Each Other PHARMACY TO DOSE David Yoder M.D.       • ondansetron (ZOFRAN) syringe/vial injection 4 mg  4 mg Intravenous Q4HRS PRN David Yoder M.D.       • promethazine (PHENERGAN) suppository 12.5-25 mg  12.5-25 mg Rectal Q4HRS PRN David Yoder M.D.           Fluids    Intake/Output Summary (Last 24 hours) at 12/10/2021 1511  Last data filed at 12/10/2021 1400  Gross per 24 hour   Intake 2795.42 ml   Output 2655 ml   Net 140.42 ml       Laboratory  Recent Labs     12/08/21  0224 12/09/21  0312 12/10/21  0223   ISTATAPH 7.548* 7.523* 7.497   ISTATAPCO2 42.2* 43.7* 48.2*   ISTATAPO2 68 59* 94*   ISTATATCO2 38* 37* 39*   LXIHPAU2LAK 95 92* 98   ISTATARTHCO3 36.7* 35.9* 37.4*   ISTATARTBE 13* 12* 13*   ISTATTEMP 36.0 C 36.7 C 37.2 C   ISTATFIO2 60 50 60   ISTATSPEC Arterial Arterial Arterial   ISTATAPHTC 7.564* 7.527* 7.494   ETZHCSTE2VR 64 58* 96*         Recent Labs     12/08/21  0530 12/08/21  1130 12/09/21  0535 12/09/21  1255 12/09/21  1800 12/10/21  0040 12/10/21  0510   SODIUM 149*  --  148*  --   --   --  150*   POTASSIUM 3.2*   < > 3.8   < > 3.9 4.0 4.0   CHLORIDE 104  --  107  --   --   --  111   CO2 34*  --  33  --   --   --  33   BUN 70*  --  56*  --   --    --  44*   CREATININE 2.06*  --  1.14  --   --   --  0.73   MAGNESIUM 2.1  --  1.8  --   --   --  1.8   PHOSPHORUS 4.2  --  2.5  --   --   --  1.4*   CALCIUM 8.6  --  8.3*  --   --   --  8.3*    < > = values in this interval not displayed.     Recent Labs     12/08/21  0530 12/09/21  0535 12/10/21  0510   ALTSGPT 68* 67* 85*   ASTSGOT 72* 68* 109*   ALKPHOSPHAT 235* 213* 242*   TBILIRUBIN 0.5 0.5 0.6   GLUCOSE 209* 236* 172*     Recent Labs     12/08/21  0530 12/09/21 0535 12/10/21  0510   WBC 10.1 13.1* 15.9*   NEUTSPOLYS 75.90* 89.40* 91.90*   LYMPHOCYTES 4.40* 4.40* 1.80*   MONOCYTES 12.50 0.90 0.00   EOSINOPHILS 1.80 0.90 2.70   BASOPHILS 0.00 0.00 0.00   ASTSGOT 72* 68* 109*   ALTSGPT 68* 67* 85*   ALKPHOSPHAT 235* 213* 242*   TBILIRUBIN 0.5 0.5 0.6     Recent Labs     12/08/21 0530 12/09/21  0535 12/10/21  0510   RBC 4.06* 3.74* 3.62*   HEMOGLOBIN 10.7* 10.1* 9.6*   HEMATOCRIT 35.6* 33.1* 32.1*   PLATELETCT 123* 174 199       Imaging  X-Ray:  I have personally reviewed the images and compared with prior images. and My impression is: Unchanged left lower lobe opacities    Assessment/Plan  * Cardiac arrest (HCC)  Assessment & Plan  S/p PEA, asystole and ventricular tachycardia in ED - precipitated by hemorrhagic shock  ROSC after 7 rounds of CPR, IV epinephrine, IV bicarbonate solution and massive transfusion protocol  Echo with RV dysfunction and RVSP of 50 mmHg  Illogically improved, following but remains on ventilator    Atelectasis  Assessment & Plan  Patient sedated, body habitus and on ventilator all contributing  Monitor for the need for therapeutic bronchoscopy, last performed 12/8    Hypokalemia  Assessment & Plan  Replete potassium, ERP    Paroxysmal atrial fibrillation (HCC)  Assessment & Plan  Continue carvedilol, 25 mg twice daily  Optimize potassium and magnesium  [id number] score high, 10% risk CVA, will need anticoagulation when/if becomes clinically appropriate    Hypocalcemia  Assessment &  Plan  Replete calcium, ERP    Hypomagnesemia  Assessment & Plan  Replete magnesium, ERP    Hemoperitoneum  Assessment & Plan  Surgery has evaluated  No indication for surgery at this time  Trend hemoglobin and transfuse PRBCs to keep hemoglobin greater than 7  Unchanged hemoglobin times approximately 1 week  Anticoagulation still on hold  Status post IVC filter  Heparin PPx reinitiated 12/10, monitoring closely    Gastrointestinal hemorrhage  Assessment & Plan  History of gastrointestinal hemorrhage with no source identified-no recurrence/monitoring  Possible gastrointestinal hemorrhage provoked by IV heparin for pulmonary embolism - no gross blood noted in stool - positive for occult blood  Continue PPI twice daily  GI consult noted, if rebleeds consider GI consultation  Trend hemoglobin and transfuse PRBCs to keep hemoglobin greater than 7  Heparin subcu PPx restarted 12/10  Status post IVC filter off anticoagulation for A. fib    DVT (deep venous thrombosis) ([facility])  Assessment & Plan  Imaging reveals LUE DVT associated with her PICC line and a distal LLE DVT  Anticoagulation strictly contraindicated due to acute gastrointestinal hemorrhage and hemoperitoneum  IVC filter placement on 12/6  Also needs anticoagulation from a PAF [id number] 2 risk score - 10%    Shock ([facility])- (present on admission)  Assessment & Plan  Hemorrhagic shock with acute gastrointestinal blood loss and vasodistributive shock  Shock has resolved, weaned off norepinephrine    Acute pancreatitis  Assessment & Plan  Resolved  She is having bowel movements  She is tolerating enteral tube feedings  Continue to monitor    PE (pulmonary thromboembolism) ([facility])- (present on admission)  Assessment & Plan  Anticoagulation is contraindicated  IVC filter placement on 12/6  No change in hemoglobin or significant GI bleeding in approximately 1 week on review with team 12/10  Start heparin 5000 units subcu PPx and monitor-no full anticoagulation at this  time    Acute blood loss anemia- (present on admission)  Assessment & Plan  Suspect gastrointestinal source of blood loss as well as acute hemoperitoneum  Trend hemoglobin and transfuse PRBCs to keep hemoglobin greater than 7  Likely no recurrence, hemoglobin unchanged for approximately 7 days  Resume heparin subcu PPx-monitor closely, continue to hold full dose anticoagulation she needs for DVT/PAF    Thrombocytopenia (HCC)- (present on admission)  Assessment & Plan  Trend platelet count and thromboelastogram with platelet mapping as needed  Transfuse platelets as required  Count normalized 12/9    Hemochromatosis- (present on admission)  Assessment & Plan  History of    Adrenal insufficiency (Valentín's disease) (HCC)- (present on admission)  Assessment & Plan  Continue hydrocortisone to 20 mg twice daily  Continue Florinef, 0.1 mg twice daily  Monitor for the need to bump hydrocortisone up with stress    Elevated LFTs- (present on admission)  Assessment & Plan  Due to ischemic hepatopathy from cardiac arrest and shock  Trend enzymes and synthetic function  Avoid hepatotoxins  Mildly trending up-continue to monitor    MRSA and E. coli sinusitis  Assessment & Plan  On outpatient IV antibiotics - LUE PICC in place  Continue vancomycin and ceftriaxone-6-week course of antibiotics planned per ID    Acute kidney injury (HCC)- (present on admission)  Assessment & Plan  Suspect ATN due to shock in lady with underlying atrophic kidneys  Monitor renal function and urine output  Avoid nephrotoxins and renal dose medications  Improving renal function-continue serial BMP    Primary hypertension- (present on admission)  Assessment & Plan  Goal SBP less than 160  Hydralazine and labetalol as necessary to achieve blood pressure goals  Continue amiloride, 5 mg daily  Continue doxazosin, 2 mg daily  Continue carvedilol, 25 mg twice daily  Reassess daily    Hypothyroidism- (present on admission)  Assessment & Plan  Continue  levothyroxine and liothyronine  Repeat TSH in 4 weeks, sooner if significant arrhythmias occur without obvious etiology  Most recent TSH borderline suppressed    Obesity (BMI 35.0-39.9 without comorbidity)- (present on admission)  Assessment & Plan  Behavioral modification and weight loss to be encouraged when clinically appropriate  Hypothyroid on repletion therapy    Acute respiratory failure with hypoxia (HCC)- (present on admission)  Assessment & Plan  Intubated 12/2  RT protocols/ventilator bundle  SAT/SBT as appropriate  Mobility level 2-3       VTE:  Contraindicated  Ulcer: PPI  Lines: Central Line  Ongoing indication addressed and Perez Catheter  Ongoing indication addressed     I have performed a physical exam and reviewed and updated ROS and Plan today (12/10/2021). In review of yesterday's note (12/9/2021), there are no changes except as documented above.     I have assessed and reassessed her respiratory status with ventilator adjustments, airway mechanics, ventilator waveforms, blood pressure, hemodynamics, cardiovascular status as well as her neurologic status.  She is at increased risk for worsening respiratory and cardiovascular system dysfunction.    Discussed patient condition and risk of morbidity and/or mortality with RN, RT, Pharmacy,  and Charge nurse / hot rounds     The patient remains critically ill.  Critical care time = 45 minutes in directly providing and coordinating critical care and extensive data review.  No time overlap and excludes procedures.

## 2021-12-10 NOTE — ASSESSMENT & PLAN NOTE
Multifactorial-status post arrest, prolonged course of ventilator, weak and not moving much, obese with episode of pancreatitis  Monitor for the need for therapeutic bronchoscopy, active TX FOB 12/8, follow-up TX FOB with extubation 12/12 with minimal plugs  Mobilize to chair twice daily  Up for meals, patient encouraged again, seems motivated now  I-S, as needed IPPV/PEP-reviewed with RT  BIPAP with sleep and naps as needed  RT protocols  Serial chest x-ray as needed

## 2021-12-10 NOTE — CARE PLAN
The patient is Watcher - Medium risk of patient condition declining or worsening    Shift Goals  Clinical Goals: improve oxygenation, wean ventilator, comfort, range of motion, sit up in cardiac chair   Patient Goals: improvement   Family Goals: improvement     Progress made toward(s) clinical / shift goals:  progressing    Problem: Pain - Standard  Goal: Alleviation of pain or a reduction in pain to the patient’s comfort goal  Outcome: Progressing   Managing pain with PRN medication, repositioning, distractions.    Problem: Knowledge Deficit - Standard  Goal: Patient and family/care givers will demonstrate understanding of plan of care, disease process/condition, diagnostic tests and medications  Outcome: Progressing   Education to family () and patient about medications, importance of mobility, SAT/SBT. Emotions validated and family/patient supported during these difficult events.    Problem: Skin Integrity  Goal: Skin integrity is maintained or improved  Outcome: Progressing  Redressed wounds. Barrier paste applied. Moved patient to cardiac chair today for 1.5 hours. Waffle cushion placed. Q2h turns.     Problem: Fall Risk  Goal: Patient will remain free from falls  Outcome: Progressing    Problem: Safety - Medical Restraint  Goal: Remains free of injury from restraints (Restraint for Interference with Medical Device)  Outcome: Progressing  Goal: Free from restraint(s) (Restraint for Interference with Medical Device)  Outcome: Progressing   Discussion with patient about medical restraints. ROM performed Q2h.

## 2021-12-10 NOTE — DIETARY
Nutrition support weekly update:  Day 9 of admit.  Donna Isaac is a 56 y.o. female with admitting DX ofAbdominal Pain, PE (pulmonary thromboembolism) (HCC), Shock (HCC)  Tube feeding initiated .  Current TF via gastric Cortrak: Novasource Renal @ goal rate 30 ml/hr, providing 1440 kcal, 65 grams protein, and 518 ml free water per day.    Assessment:  Weight 111.1 kg with BMI 42.04.  Weight for calculations: Lowest weight 98.7 kg on ; BMI 37.35.    Estimated nutritional needs:  Calculation/Equation: REE per MSJ = 1559 kcal/day; RMR per PSU (vent L/min 8.7, T max/ 24 hours 37.2) = 1848 kcal/day (65-70% = 9931-4458 kcal/day)  Total Calories / day: 1086 - 1382 (Calories / k - 14)  Total Grams Protein / day: 59 - 79 (Grams Protein / k.6 - 0.8)    Evaluation:   1. Pt remains intubated and unable to take PO diet.  2. TF @ goal, meeting estimated nutritional needs per above predictive equations.    3. Labs reviewed. Na 150, BUN 44, phos 1.4.  4. Meds include decadron, cortef, insulin, electrolyte replacement, and bowel regimen.  5. GI: Rectal tube  6. Skin: Moisture-associated skin damage  7. Nephrology signed off .  8. Will transition off of renal TF formula and meet protein needs per improved kidney function. 2.0 grams protein/kg IBW (54.5 kg) = 109 grams protein/day.    Malnutrition risk: None identified @ this time.    Recommendations/Plan:  1. Change TF to Peptamen Intense VHP and advance per protocol to goal @ 50 ml/hr to provide 1200 kcal, 110 grams protein, and 1008 ml free water per day.  2. Fluids per MD. DONOHUE following.

## 2021-12-10 NOTE — PROGRESS NOTES
Pharmacy Vancomycin Kinetics Note for 12/10/2021     56 y.o. female on Vancomycin day # 9     Vancomycin Indication (Two level/Trough based Dosing): Skin/skin structure infection (goal trough 10-15)    Provider specified end date: 12/21/21    Active Antibiotics (From admission, onward)    Ordered     Ordering Provider       Fri Dec 3, 2021  4:25 PM    12/03/21 1625  cefTRIAXone (Rocephin) 2 g in  mL IVPB  EVERY 24 HOURS         Eligio Del Castillo M.D.       Fri Dec 3, 2021  9:08 AM    12/03/21 0908  MD Alert...Vancomycin per Pharmacy  PHARMACY TO DOSE        Question:  Indication(s) for vancomycin?  Answer:  Other (comments)    Eligio Del Castillo M.D.          Dosing Weight: 111 kg (244 lb 11.4 oz)      Admission History: Admitted on 12/1/2021 for PE (pulmonary thromboembolism) ([facility]) [I26.99]  Shock ([facility]) [R57.9]  Pertinent history: Patient with history of sinusitis followed by [facility] and has been on multiple courses of antibiotics.  Most recent culture with E coli and MRSA.  Vancomycin and ceftriaxone initiated.    Allergies:     Ancef [cefazolin], Bactrim [sulfamethoxazole w-trimethoprim], Bee venom, Buprenorphine, Clindamycin, Contrast media with iodine [iodine], Doxycycline, Econazole, Flagyl  [metronidazole], Floxin [ofloxacin], Gadolinium derivatives, Hydrocodone-acetaminophen, Iodine, Keflex, Levofloxacin, Morphine, Naloxone, Nitrofurantoin, Norco [apap-fd&c yellow #10 [place]-hydrocodone], Nyquil, Oxycodone, Paricalcitol, Penicillins, Tape, Tramadol, Vicks dayquil cold, Azithromycin, Bextra [valdecoxib], Linezolid, Adhesive remover [skin adhesives], Codeine, Sulfa drugs, and Tygacil [tigecycline]     Pertinent cultures to date:     Results     Procedure Component Value Units Date/Time    Quant Bronchial Washing [[id number]] Collected: 12/08/21 1015    Order Status: Completed Specimen: Respirate from Bronchoalveolar Lavage Updated: 12/09/21 1938     Significant Indicator NEG     Source RESP     Site  BRONCHOALVEOLAR LAVAGE     Culture Result 13,500 cfu/mL usual upper respiratory derick     Gram Stain Result Moderate WBCs.  Rare Yeast.      Narrative:      Collected By: [id number] SOLITARIOAMBER DEJESUS  BAL from LLL  Collected By: [id number] SOLITARIOAMBER DEJESUS  Which Lobe (Bronch Only):->LLL  Collected By: [id number] SOLITARIOAMBER DEJESUS    Fungal Culture - BAL [[id number]] Collected: 12/08/21 1015    Order Status: Completed Specimen: Respirate from Bronchoalveolar Lavage Updated: 12/09/21 1938     Significant Indicator NEG     Source RESP     Site BRONCHOALVEOLAR LAVAGE     Culture Result -    Narrative:      Collected By: [id number] SOLITARIOAMBER DEJESUS  BAL from LLL  Collected By: [id number] SOLITARIOAMBER DEJESUS  Which Lobe (Bronch Only):->LLL  Collected By: [id number] SOLITARIOAMBER DEJESUS    Fungal Smear - BAL [[id number]] Collected: 12/08/21 1015    Order Status: Completed Specimen: Respirate from Bronchoalveolar Lavage Updated: 12/09/21 1938     Significant Indicator NEG     Source RESP     Site BRONCHOALVEOLAR LAVAGE     Fungal Smear Results Rare yeast seen.    Narrative:      Collected By: [id number] SOLITARIOAMBER DEJESUS  BAL from LLL  Collected By: [id number] SOLITARIOAMBER DEJESUS  Which Lobe (Bronch Only):->LLL  Collected By: [id number] SOLITARIOAMBER DEJESUS    AFB Culture - BAL [[id number]] Collected: 12/08/21 1015    Order Status: Completed Specimen: Respirate from Bronchoalveolar Lavage Updated: 12/09/21 0713    Narrative:      Collected By: [id number] SOLITARIOAMBER DEJESUS  BAL from LLL  Collected By: [id number] SOLITARIOAMBER DEJESUS  Which Lobe (Bronch Only):->LLL    GRAM STAIN [[id number]] Collected: 12/08/21 1015    Order Status: Completed Specimen: Respirate Updated: 12/09/21 0641     Significant Indicator .     Source RESP     Site BRONCHOALVEOLAR LAVAGE     Gram Stain Result Moderate WBCs.  Rare Yeast.      Narrative:      Collected By: [id number] SOLITARIOAMBER DEJESUS  BAL from LLL  Collected By: [id number] SOLITARIOAMBER DEJESUS  Which Lobe  "(Bronch Only):->LLL  Collected By: [id number] ASHLEY DEJESUS    Culture Respiratory W/ Grm Stn - BAL [[id number]] Collected: 12/08/21 1015    Order Status: Canceled Specimen: Other from Bronchoalveolar Lavage     BLOOD CULTURE [[id number]] Collected: 12/01/21 2320    Order Status: Completed Specimen: Blood from Peripheral Updated: 12/07/21 0100     Significant Indicator NEG     Source BLD     Site PERIPHERAL     Culture Result No growth after 5 days of incubation.    Narrative:      2 of 2 blood culture x2  Sites order. Per Hospital Policy:  Only change Specimen Src: to \"Line\" if specified by physician  order.  Right AC    BLOOD CULTURE [[id number]] Collected: 12/01/21 1345    Order Status: Completed Specimen: Blood from Peripheral Updated: 12/06/21 1500     Significant Indicator NEG     Source BLD     Site PERIPHERAL     Culture Result No growth after 5 days of incubation.    Narrative:      1 of 2 for Blood Culture x 2 sites order. Per Hospital  Policy: Only change Specimen Src: to \"Line\" if specified by  physician order.  No site indicated    URINALYSIS CULTURE, IF INDICATED [[id number]]  (Abnormal) Collected: 12/01/21 1832    Order Status: Completed Specimen: Urine Updated: 12/01/21 1918     Color DK Yellow     Character Cloudy     Specific Gravity 1.030     Ph 5.0     Glucose Negative mg/dL      Ketones Negative mg/dL      Protein 30 mg/dL      Bilirubin Negative     Urobilinogen, Urine 0.2     Nitrite Negative     Leukocyte Esterase Trace     Occult Blood Trace     Micro Urine Req Microscopic    Narrative:      Indication for culture:->Patient WITHOUT an indwelling Perez  catheter in place with new onset of Dysuria, Frequency,  Urgency, and/or Suprapubic pain    URINALYSIS [[id number]]     Order Status: Canceled Specimen: Urine           Labs:     Estimated Creatinine Clearance: 66.4 mL/min (by C-G formula based on SCr of 1.14 mg/dL).  Recent Labs     12/07/21  0340 12/08/21  0530 12/09/21  0535   WBC 9.1 10.1 " "13.1*   NEUTSPOLYS 91.10* 75.90* 89.40*   BANDSSTABS  --  3.60 2.60     Recent Labs     21  0340 21  0530 21  0535   BUN 56* 70* 56*   CREATININE 2.80* 2.06* 1.14   ALBUMIN 2.7* 2.8* 2.6*       Intake/Output Summary (Last 24 hours) at 12/10/2021 0016  Last data filed at 2021 2200  Gross per 24 hour   Intake 1628.11 ml   Output 2565 ml   Net -936.89 ml      /81   Pulse 84   Temp 36.7 °C (98.1 °F) (Bladder)   Resp (!) 22   Ht 1.626 m (5' 4\")   Wt 111 kg (244 lb 14.9 oz)   SpO2 98%  Temp (24hrs), Av.8 °C (98.2 °F), Min:36.5 °C (97.7 °F), Max:37 °C (98.6 °F)      List concerns for Vancomycin clearance:     BUN/Scr ratio greater than 20:1;Obesity    Pharmacokinetics:     Trough kinetics:   Recent Labs     21  1936   VANCORANDOM 18.7       A/P:     -  Vancomycin dose: 1750 mg IV (15 mg/kg) x 1 dose    -  Next vancomycin level(s):    -VR on  at 0530    -  Comments: Renal indices improving however, trough still remain supra-therapeutic. Anticipate that patient will become therapeutic over next 12 hours. Initiating one time pulse dose to begin in 12 hours and will draw random 24 hour given perpetual MARY LOU. Consider adjusting random level if renal function has dramatically improved with AM labs. Will continue to adjust based on levels and cultures.     Amrit Nova, PharmD  "

## 2021-12-11 ENCOUNTER — APPOINTMENT (OUTPATIENT)
Dept: RADIOLOGY | Facility: MEDICAL CENTER | Age: 56
DRG: 981 | End: 2021-12-11
Attending: INTERNAL MEDICINE
Payer: MEDICARE

## 2021-12-11 LAB
ALBUMIN SERPL BCP-MCNC: 2.2 G/DL (ref 3.2–4.9)
ALBUMIN/GLOB SERPL: 0.8 G/DL
ALP SERPL-CCNC: 227 U/L (ref 30–99)
ALT SERPL-CCNC: 94 U/L (ref 2–50)
ANION GAP SERPL CALC-SCNC: 8 MMOL/L (ref 7–16)
ANISOCYTOSIS BLD QL SMEAR: ABNORMAL
AST SERPL-CCNC: 105 U/L (ref 12–45)
BASOPHILS # BLD AUTO: 0 % (ref 0–1.8)
BASOPHILS # BLD: 0 K/UL (ref 0–0.12)
BILIRUB SERPL-MCNC: 0.6 MG/DL (ref 0.1–1.5)
BUN SERPL-MCNC: 34 MG/DL (ref 8–22)
CALCIUM SERPL-MCNC: 8.1 MG/DL (ref 8.5–10.5)
CHLORIDE SERPL-SCNC: 108 MMOL/L (ref 96–112)
CO2 SERPL-SCNC: 31 MMOL/L (ref 20–33)
CREAT SERPL-MCNC: 0.56 MG/DL (ref 0.5–1.4)
EOSINOPHIL # BLD AUTO: 0 K/UL (ref 0–0.51)
EOSINOPHIL NFR BLD: 0 % (ref 0–6.9)
ERYTHROCYTE [DISTWIDTH] IN BLOOD BY AUTOMATED COUNT: 75.5 FL (ref 35.9–50)
GLOBULIN SER CALC-MCNC: 2.6 G/DL (ref 1.9–3.5)
GLUCOSE BLD-MCNC: 139 MG/DL (ref 65–99)
GLUCOSE BLD-MCNC: 174 MG/DL (ref 65–99)
GLUCOSE BLD-MCNC: 175 MG/DL (ref 65–99)
GLUCOSE BLD-MCNC: 191 MG/DL (ref 65–99)
GLUCOSE SERPL-MCNC: 183 MG/DL (ref 65–99)
HCT VFR BLD AUTO: 29.7 % (ref 37–47)
HGB BLD-MCNC: 8.7 G/DL (ref 12–16)
HYPOCHROMIA BLD QL SMEAR: ABNORMAL
LYMPHOCYTES # BLD AUTO: 0.66 K/UL (ref 1–4.8)
LYMPHOCYTES NFR BLD: 4.4 % (ref 22–41)
MAGNESIUM SERPL-MCNC: 1.6 MG/DL (ref 1.5–2.5)
MANUAL DIFF BLD: NORMAL
MCH RBC QN AUTO: 26.2 PG (ref 27–33)
MCHC RBC AUTO-ENTMCNC: 29.3 G/DL (ref 33.6–35)
MCV RBC AUTO: 89.5 FL (ref 81.4–97.8)
MICROCYTES BLD QL SMEAR: ABNORMAL
MONOCYTES # BLD AUTO: 0 K/UL (ref 0–0.85)
MONOCYTES NFR BLD AUTO: 0 % (ref 0–13.4)
MORPHOLOGY BLD-IMP: NORMAL
NEUTROPHILS # BLD AUTO: 14.44 K/UL (ref 2–7.15)
NEUTROPHILS NFR BLD: 95.6 % (ref 44–72)
NRBC # BLD AUTO: 0.02 K/UL
NRBC BLD-RTO: 0.1 /100 WBC
OVALOCYTES BLD QL SMEAR: NORMAL
PHOSPHATE SERPL-MCNC: 1.9 MG/DL (ref 2.5–4.5)
PLATELET # BLD AUTO: 258 K/UL (ref 164–446)
PLATELET BLD QL SMEAR: NORMAL
PMV BLD AUTO: 12.9 FL (ref 9–12.9)
POIKILOCYTOSIS BLD QL SMEAR: NORMAL
POLYCHROMASIA BLD QL SMEAR: NORMAL
POTASSIUM SERPL-SCNC: 4 MMOL/L (ref 3.6–5.5)
PROT SERPL-MCNC: 4.8 G/DL (ref 6–8.2)
RBC # BLD AUTO: 3.32 M/UL (ref 4.2–5.4)
RBC BLD AUTO: PRESENT
SCHISTOCYTES BLD QL SMEAR: NORMAL
SODIUM SERPL-SCNC: 147 MMOL/L (ref 135–145)
SPHEROCYTES BLD QL SMEAR: NORMAL
VANCOMYCIN SERPL-MCNC: 17.4 UG/ML
WBC # BLD AUTO: 15.1 K/UL (ref 4.8–10.8)

## 2021-12-11 PROCEDURE — 71045 X-RAY EXAM CHEST 1 VIEW: CPT

## 2021-12-11 PROCEDURE — 84100 ASSAY OF PHOSPHORUS: CPT

## 2021-12-11 PROCEDURE — 99291 CRITICAL CARE FIRST HOUR: CPT | Performed by: INTERNAL MEDICINE

## 2021-12-11 PROCEDURE — 94003 VENT MGMT INPAT SUBQ DAY: CPT

## 2021-12-11 PROCEDURE — A9270 NON-COVERED ITEM OR SERVICE: HCPCS | Performed by: INTERNAL MEDICINE

## 2021-12-11 PROCEDURE — 82962 GLUCOSE BLOOD TEST: CPT | Mod: 91

## 2021-12-11 PROCEDURE — 80053 COMPREHEN METABOLIC PANEL: CPT

## 2021-12-11 PROCEDURE — 770022 HCHG ROOM/CARE - ICU (200)

## 2021-12-11 PROCEDURE — 700102 HCHG RX REV CODE 250 W/ 637 OVERRIDE(OP): Performed by: INTERNAL MEDICINE

## 2021-12-11 PROCEDURE — 700111 HCHG RX REV CODE 636 W/ 250 OVERRIDE (IP): Performed by: INTERNAL MEDICINE

## 2021-12-11 PROCEDURE — 700105 HCHG RX REV CODE 258: Performed by: INTERNAL MEDICINE

## 2021-12-11 PROCEDURE — 94799 UNLISTED PULMONARY SVC/PX: CPT

## 2021-12-11 PROCEDURE — 700101 HCHG RX REV CODE 250: Performed by: STUDENT IN AN ORGANIZED HEALTH CARE EDUCATION/TRAINING PROGRAM

## 2021-12-11 PROCEDURE — 700105 HCHG RX REV CODE 258: Performed by: STUDENT IN AN ORGANIZED HEALTH CARE EDUCATION/TRAINING PROGRAM

## 2021-12-11 PROCEDURE — 83735 ASSAY OF MAGNESIUM: CPT

## 2021-12-11 PROCEDURE — 80202 ASSAY OF VANCOMYCIN: CPT

## 2021-12-11 PROCEDURE — 85007 BL SMEAR W/DIFF WBC COUNT: CPT

## 2021-12-11 PROCEDURE — 85027 COMPLETE CBC AUTOMATED: CPT

## 2021-12-11 RX ORDER — ACETAMINOPHEN 325 MG/1
650 TABLET ORAL EVERY 4 HOURS PRN
Status: DISCONTINUED | OUTPATIENT
Start: 2021-12-11 | End: 2021-12-14

## 2021-12-11 RX ORDER — MAGNESIUM SULFATE HEPTAHYDRATE 40 MG/ML
2 INJECTION, SOLUTION INTRAVENOUS ONCE
Status: COMPLETED | OUTPATIENT
Start: 2021-12-11 | End: 2021-12-11

## 2021-12-11 RX ORDER — LOSARTAN POTASSIUM 50 MG/1
100 TABLET ORAL
Status: DISCONTINUED | OUTPATIENT
Start: 2021-12-11 | End: 2021-12-14

## 2021-12-11 RX ADMIN — LEVOTHYROXINE SODIUM 75 MCG: 0.07 TABLET ORAL at 05:39

## 2021-12-11 RX ADMIN — CARVEDILOL 25 MG: 25 TABLET, FILM COATED ORAL at 18:26

## 2021-12-11 RX ADMIN — FUROSEMIDE 20 MG: 10 INJECTION, SOLUTION INTRAMUSCULAR; INTRAVENOUS at 17:18

## 2021-12-11 RX ADMIN — CEFTRIAXONE SODIUM 2 G: 2 INJECTION, POWDER, FOR SOLUTION INTRAMUSCULAR; INTRAVENOUS at 05:39

## 2021-12-11 RX ADMIN — HYDROMORPHONE HYDROCHLORIDE 1 MG: 1 INJECTION, SOLUTION INTRAMUSCULAR; INTRAVENOUS; SUBCUTANEOUS at 17:18

## 2021-12-11 RX ADMIN — HEPARIN SODIUM 5000 UNITS: 5000 INJECTION, SOLUTION INTRAVENOUS; SUBCUTANEOUS at 21:29

## 2021-12-11 RX ADMIN — ONDANSETRON 4 MG: 2 INJECTION INTRAMUSCULAR; INTRAVENOUS at 23:14

## 2021-12-11 RX ADMIN — MAGNESIUM SULFATE HEPTAHYDRATE 2 G: 2 INJECTION, SOLUTION INTRAVENOUS at 09:51

## 2021-12-11 RX ADMIN — DOXAZOSIN 2 MG: 2 TABLET ORAL at 05:38

## 2021-12-11 RX ADMIN — HEPARIN SODIUM 5000 UNITS: 5000 INJECTION, SOLUTION INTRAVENOUS; SUBCUTANEOUS at 14:22

## 2021-12-11 RX ADMIN — DEXAMETHASONE 0.5 MG: 1 TABLET ORAL at 05:37

## 2021-12-11 RX ADMIN — GABAPENTIN 200 MG: 100 CAPSULE ORAL at 21:29

## 2021-12-11 RX ADMIN — LABETALOL HYDROCHLORIDE 20 MG: 5 INJECTION, SOLUTION INTRAVENOUS at 03:30

## 2021-12-11 RX ADMIN — OXYCODONE HYDROCHLORIDE 10 MG: 10 TABLET ORAL at 02:06

## 2021-12-11 RX ADMIN — OMEPRAZOLE 40 MG: KIT at 18:26

## 2021-12-11 RX ADMIN — HEPARIN SODIUM 5000 UNITS: 5000 INJECTION, SOLUTION INTRAVENOUS; SUBCUTANEOUS at 05:39

## 2021-12-11 RX ADMIN — HYDROMORPHONE HYDROCHLORIDE 1 MG: 1 INJECTION, SOLUTION INTRAMUSCULAR; INTRAVENOUS; SUBCUTANEOUS at 22:58

## 2021-12-11 RX ADMIN — DOCUSATE SODIUM 50 MG AND SENNOSIDES 8.6 MG 2 TABLET: 8.6; 5 TABLET, FILM COATED ORAL at 05:39

## 2021-12-11 RX ADMIN — DIBASIC SODIUM PHOSPHATE, MONOBASIC POTASSIUM PHOSPHATE AND MONOBASIC SODIUM PHOSPHATE 500 MG: 852; 155; 130 TABLET ORAL at 18:26

## 2021-12-11 RX ADMIN — GABAPENTIN 200 MG: 100 CAPSULE ORAL at 14:22

## 2021-12-11 RX ADMIN — DEXAMETHASONE 0.5 MG: 1 TABLET ORAL at 18:27

## 2021-12-11 RX ADMIN — DIBASIC SODIUM PHOSPHATE, MONOBASIC POTASSIUM PHOSPHATE AND MONOBASIC SODIUM PHOSPHATE 500 MG: 852; 155; 130 TABLET ORAL at 14:22

## 2021-12-11 RX ADMIN — HYDROMORPHONE HYDROCHLORIDE 1 MG: 1 INJECTION, SOLUTION INTRAMUSCULAR; INTRAVENOUS; SUBCUTANEOUS at 13:27

## 2021-12-11 RX ADMIN — AMILORIDE HYDROCLORIDE 5 MG: 5 TABLET ORAL at 05:38

## 2021-12-11 RX ADMIN — LIOTHYRONINE SODIUM 25 MCG: 25 TABLET ORAL at 05:38

## 2021-12-11 RX ADMIN — HYDROCORTISONE 10 MG: 20 TABLET ORAL at 18:26

## 2021-12-11 RX ADMIN — FLUDROCORTISONE ACETATE 0.1 MG: 0.1 TABLET ORAL at 05:39

## 2021-12-11 RX ADMIN — INSULIN HUMAN 5 UNITS: 100 INJECTION, SUSPENSION SUBCUTANEOUS at 06:01

## 2021-12-11 RX ADMIN — OMEPRAZOLE 40 MG: KIT at 05:46

## 2021-12-11 RX ADMIN — OXYCODONE HYDROCHLORIDE 10 MG: 10 TABLET ORAL at 10:21

## 2021-12-11 RX ADMIN — OXYCODONE HYDROCHLORIDE 10 MG: 10 TABLET ORAL at 14:27

## 2021-12-11 RX ADMIN — DEXMEDETOMIDINE 0.6 MCG/KG/HR: 200 INJECTION, SOLUTION INTRAVENOUS at 04:10

## 2021-12-11 RX ADMIN — GABAPENTIN 200 MG: 100 CAPSULE ORAL at 05:37

## 2021-12-11 RX ADMIN — DIBASIC SODIUM PHOSPHATE, MONOBASIC POTASSIUM PHOSPHATE AND MONOBASIC SODIUM PHOSPHATE 500 MG: 852; 155; 130 TABLET ORAL at 09:51

## 2021-12-11 RX ADMIN — FUROSEMIDE 20 MG: 10 INJECTION, SOLUTION INTRAMUSCULAR; INTRAVENOUS at 05:40

## 2021-12-11 RX ADMIN — OXYCODONE HYDROCHLORIDE 10 MG: 10 TABLET ORAL at 19:36

## 2021-12-11 RX ADMIN — HYDROCORTISONE 20 MG: 20 TABLET ORAL at 05:38

## 2021-12-11 RX ADMIN — ACETAMINOPHEN 650 MG: 325 TABLET, FILM COATED ORAL at 10:27

## 2021-12-11 RX ADMIN — VANCOMYCIN HYDROCHLORIDE 1500 MG: 500 INJECTION, POWDER, LYOPHILIZED, FOR SOLUTION INTRAVENOUS at 18:33

## 2021-12-11 RX ADMIN — CARVEDILOL 25 MG: 25 TABLET, FILM COATED ORAL at 05:38

## 2021-12-11 RX ADMIN — LOSARTAN POTASSIUM 100 MG: 50 TABLET, FILM COATED ORAL at 09:51

## 2021-12-11 RX ADMIN — HYDROMORPHONE HYDROCHLORIDE 1 MG: 1 INJECTION, SOLUTION INTRAMUSCULAR; INTRAVENOUS; SUBCUTANEOUS at 03:45

## 2021-12-11 RX ADMIN — FLUDROCORTISONE ACETATE 0.1 MG: 0.1 TABLET ORAL at 18:26

## 2021-12-11 ASSESSMENT — PAIN DESCRIPTION - PAIN TYPE
TYPE: ACUTE PAIN;CHRONIC PAIN
TYPE: ACUTE PAIN
TYPE: ACUTE PAIN;CHRONIC PAIN
TYPE: ACUTE PAIN
TYPE: ACUTE PAIN;CHRONIC PAIN
TYPE: ACUTE PAIN
TYPE: ACUTE PAIN
TYPE: CHRONIC PAIN;ACUTE PAIN
TYPE: ACUTE PAIN;CHRONIC PAIN
TYPE: ACUTE PAIN
TYPE: ACUTE PAIN
TYPE: ACUTE PAIN;CHRONIC PAIN
TYPE: ACUTE PAIN

## 2021-12-11 ASSESSMENT — FIBROSIS 4 INDEX: FIB4 SCORE: 2.39

## 2021-12-11 NOTE — CARE PLAN
Ventilator Daily Summary    Vent Day # 9    ETT 8.0 @ 22    Ventilator settings changed this shift:no     8 40%    Weaning trials:no    Respiratory Procedures:no    Plan: Continue current ventilator settings and wean mechanical ventilation as tolerated per physician orders.

## 2021-12-11 NOTE — CARE PLAN
The patient is Watcher - Medium risk of patient condition declining or worsening    Shift Goals  Clinical Goals: improve oxygenation, up to cardiac chair for 2+ hours, pain management  Patient Goals: improvement  Family Goals: improvement     Progress made toward(s) clinical / shift goals:    Problem: Knowledge Deficit - Standard  Goal: Patient and family/care givers will demonstrate understanding of plan of care, disease process/condition, diagnostic tests and medications  Outcome: Progressing     Problem: Pain - Standard  Goal: Alleviation of pain or a reduction in pain to the patient’s comfort goal  Outcome: Progressing     Problem: Skin Integrity  Goal: Skin integrity is maintained or improved  Outcome: Progressing     Problem: Fall Risk  Goal: Patient will remain free from falls  Outcome: Progressing       Patient is not progressing towards the following goals:

## 2021-12-11 NOTE — PROGRESS NOTES
Critical Care Progress Note    Date of admission  12/1/2021    Chief Complaint  56 y.o. female admitted 12/1/2021 with acute pancreatitis, pulmonary embolism.  She has a history of prior gastrointestinal bleeding from unknown source, MRSA sinusitis on outpatient antibiotics, [place]'s disease, GERD, primary hypertension, hemochromatosis, hypothyroidism, traumatic brain injury, myocardial infarction and fibromyalgia.    Hospital Course    12/2 -    vent day 1.  Trend hemoglobin and platelet count and transfuse blood products as required.  Full vent support.  Inhaled Flolan for RV dysfunction following cardiac arrest.  12/3 -    vent day 2.  Start bicarbonate drip for MARY LOU.  Titrating norepinephrine.  Wean off Flolan.  12/4 -    vent day 3.  Renal function worse.  Titrating norepinephrine.  12/5 -    vent day 4.  Blood pressure increased.  Change dexmedetomidine to propofol.  12/6 -    vent day 5.  Arrange for IVC filter placement.  Nephrology on board.  12/7 -    vent day 6.  IVC filter placed yesterday.  Renal function improving.  Taper hydrocortisone.  12/8 -    vent day 7.  Improving renal function.  Taper hydrocortisone.  12/9 -    vent day 8.  Start NPH.  Change propofol to dexmedetomidine.  12/10 - V#9, titrating norepinephrine-off, dexmedetomidine, FiO2 increased to 0.6, start lasix  12/11 - V#10, more alert, off pressors, SAT/SBT ongoing, FiO2 weaned to 0.4    Interval Problem Update  Reviewed last 24 hour events:    DEX 0.4 -> off  Follows, more alert, abd pain better  PEEP 8, 40%   CXR slight improved vascular plethora but persistent low volumes & BBLLA  Lasix - good response edema?  SSLLA persists  UO excellent, -1.89L last 24 HRS  SR/ST  -160  One PRN labetalol last night  T-max 100.9  WBC slight better 15.1  Home PPI  Hemoglobin down to 8.7, no bleeding clinically-monitoring  PPx SQ heparin started yesterday  Sodium 147, improved  B/C 34/0.56-both improved  Phos 1.9  Magnesium 1.6  Isela Addisons  Rx  SSI noted    Up NPH to 10 bid  SAT/SBT, if tolerated check weaning parameters, if good check ABG  Liberation trial?  Replete phosphorus/magnesium  Continue to monitor hemoglobin  Continue Lasix, may need more free water give with phosphorus repletion IV    Very poor weaning parameters, will rest patient and continue diuresis and repeat SAT/SBT in early p.m.    Beginning to review massive home medication list with pharmacy and reassess daily on what is appropriate to resume       YESTERDAY  DEX 0.4  Follows and moves all 4  40->60%, PEEP 8  SBT x one hr - soft weans, RSBI 32  CXR BBSSLLA  I/Os neg 652cc/24 hrs - positive 3.8 L ARB  LFTs increasing  Sodium 150  BMS  Tm 99.5  C3/Vanco for sinusitis  Addisons Rx - home doses  PLT count normalized  LFTs mildly worse  Renal function improved    Replete phosphorus/Mg  Free H20 today via IV with electrolyte repletion (D5W)  Lasix  Start heparin PPx 5000 Q8    Review of Systems  Review of Systems   Unable to perform ROS: Acuity of condition   Remains on ventilator    Vital Signs for last 24 hours   Temp:  [37.3 °C (99.1 °F)-38.3 °C (100.9 °F)] 38.3 °C (100.9 °F)  Pulse:  [] 108  Resp:  [14-35] 22  BP: (114-179)/() 141/64  SpO2:  [94 %-100 %] 95 %    Hemodynamic parameters for last 24 hours       Respiratory Information for the last 24 hours  Vent Mode: ASV  PEEP/CPAP: 8  MAP: 10  Control VTE (exp VT): 309    Physical Exam   Physical Exam  Constitutional:       Appearance: She is ill-appearing (Improved). She is not toxic-appearing or diaphoretic.      Interventions: She is sedated, intubated and restrained.   HENT:      Head: Normocephalic.      Nose: Nose normal.      Mouth/Throat:      Mouth: Mucous membranes are moist.      Pharynx: Oropharynx is clear.   Eyes:      General: No scleral icterus.     Conjunctiva/sclera: Conjunctivae normal.      Pupils: Pupils are equal, round, and reactive to light.   Cardiovascular:      Rate and Rhythm: Normal rate and  regular rhythm.      Pulses: Normal pulses.      Heart sounds: No murmur heard.  No gallop.       Comments:   Pulmonary:      Effort: No accessory muscle usage. She is intubated.      Breath sounds: Examination of the right-lower field reveals decreased breath sounds. Examination of the left-lower field reveals decreased breath sounds. Decreased breath sounds and rales (Improved) present. No wheezing.   Abdominal:      General: Bowel sounds are normal. There is no distension.      Palpations: Abdomen is soft.      Tenderness: There is no abdominal tenderness. There is no right CVA tenderness or left CVA tenderness.      Comments: Tympanetic-improved, Tolerating enteral tube feedings   Genitourinary:     Comments: Perez  Musculoskeletal:      Cervical back: Normal range of motion.      Right lower leg: Edema (Improved) present.      Left lower leg: Edema (Improved) present.      Comments: No clubbing or cyanosis   Skin:     General: Skin is warm.      Capillary Refill: Capillary refill takes less than 2 seconds.      Coloration: Skin is not cyanotic or mottled.      Nails: There is no clubbing.   Neurological:      General: No focal deficit present.      Mental Status: She is lethargic.      GCS: GCS eye subscore is 4. GCS verbal subscore is 1. GCS motor subscore is 6.      Cranial Nerves: Cranial nerves are intact.      Comments: Arouses, nods and follows   Psychiatric:      Comments: Unable to completely assess         Medications  Current Facility-Administered Medications   Medication Dose Route Frequency Provider Last Rate Last Admin   • magnesium sulfate IVPB premix 2 g  2 g Intravenous Once Alber Junior M.D. 25 mL/hr at 12/11/21 0951 2 g at 12/11/21 0951   • losartan (COZAAR) tablet 100 mg  100 mg Enteral Tube Q DAY Alber Junior M.D.   100 mg at 12/11/21 0951   • phosphorus (K-Phos-Neutral) per tablet 500 mg  500 mg Enteral Tube Q4HRS Alber Junior M.D.   500 mg at 12/11/21 0951   • insulin  NPH (HumuLIN N,NovoLIN N) injection  10 Units Subcutaneous BID Alber Junior M.D.       • acetaminophen (Tylenol) tablet 650 mg  650 mg Enteral Tube Q4HRS PRN Alber Junior M.D.   650 mg at 12/11/21 1027   • heparin injection 5,000 Units  5,000 Units Subcutaneous Q8HRS Alber Junior M.D.   5,000 Units at 12/11/21 0539   • furosemide (LASIX) injection 20 mg  20 mg Intravenous BID DIURETIC Charlie Stevens M.D.   20 mg at 12/11/21 0540   • dexmedetomidine (PRECEDEX) 400 mcg/100mL NS premix infusion  0.1-1.5 mcg/kg/hr Intravenous Continuous Martha Mirza M.D.   Stopped at 12/11/21 0600   • hydrocortisone (CORTEF) tablet 20 mg  20 mg Enteral Tube DAILY Eligio Del Castillo M.D.   20 mg at 12/11/21 0538    And   • hydrocortisone (CORTEF) tablet 10 mg  10 mg Enteral Tube Nightly Eligio Del Castillo M.D.   10 mg at 12/10/21 1728   • dexamethasone (DECADRON) tablet 0.5 mg  0.5 mg Enteral Tube Q12HRS Eligio Del Castillo M.D.   0.5 mg at 12/11/21 0537   • gabapentin (NEURONTIN) capsule 200 mg  200 mg Enteral Tube Q8HRS Eligio Del Castillo M.D.   200 mg at 12/11/21 0537   • oxyCODONE immediate-release (ROXICODONE) tablet 5 mg  5 mg Enteral Tube Q4HRS PRN Eligio Del Castillo M.D.   5 mg at 12/09/21 0417    Or   • oxyCODONE immediate release (ROXICODONE) tablet 10 mg  10 mg Enteral Tube Q4HRS PRN Eligio Del Castillo M.D.   10 mg at 12/11/21 1021   • HYDROmorphone (Dilaudid) injection 0.5-1 mg  0.5-1 mg Intravenous Q HOUR PRN Eligio Del Castillo M.D.   1 mg at 12/11/21 0345   • carvedilol (COREG) tablet 25 mg  25 mg Enteral Tube BID Eligio Del Castillo M.D.   25 mg at 12/11/21 0538   • doxazosin (CARDURA) tablet 2 mg  2 mg Enteral Tube DAILY Eligio Del Castillo M.D.   2 mg at 12/11/21 0538   • AMILoride (MIDAMOR) tablet 5 mg  5 mg Enteral Tube Q DAY Eligio Del Castillo M.D.   5 mg at 12/11/21 0538   • hydrALAZINE (APRESOLINE) injection 20 mg  20 mg Intravenous Q4HRS PRN Eligio Dumont  Zaid Macedo M.D.   20 mg at 12/09/21 0115   • insulin regular (HumuLIN R,NovoLIN R) injection  3-14 Units Subcutaneous Q6HRS Eligio Del Castillo M.D.   3 Units at 12/11/21 0550    And   • dextrose 50% (D50W) injection 50 mL  50 mL Intravenous Q15 MIN PRN Eligio Del Castillo M.D.       • liothyronine (CYTOMEL) tablet 25 mcg  25 mcg Enteral Tube QAM AC Eligio Del Castillo M.D.   25 mcg at 12/11/21 0538   • levothyroxine (SYNTHROID) tablet 75 mcg  75 mcg Enteral Tube AM ES Eligio Del Castillo M.D.   75 mcg at 12/11/21 0539   • omeprazole (FIRST-OMEPRAZOLE) 2 mg/mL oral susp 40 mg  40 mg Enteral Tube Q12HRS Eligio Del Castillo M.D.   40 mg at 12/11/21 0546   • fludrocortisone (FLORINEF) tablet 0.1 mg  0.1 mg Enteral Tube BID Eligio Del Castillo M.D.   0.1 mg at 12/11/21 0539   • Pharmacy Consult: Enteral tube insertion - review meds/change route/product selection  1 Each Other PHARMACY TO DOSE Eligio Del Castillo M.D.       • senna-docusate (PERICOLACE or SENOKOT S) 8.6-50 MG per tablet 2 Tablet  2 Tablet Enteral Tube BID Eligio Del Castillo M.D.   2 Tablet at 12/11/21 0539    And   • polyethylene glycol/lytes (MIRALAX) PACKET 1 Packet  1 Packet Enteral Tube QDAY PRN Eligio Del Castillo M.D.        And   • magnesium hydroxide (MILK OF MAGNESIA) suspension 30 mL  30 mL Enteral Tube QDAY PRN Eligio Del Castillo M.D.        And   • bisacodyl (DULCOLAX) suppository 10 mg  10 mg Rectal QDAY PRN Eligio Del Castillo M.D.       • ondansetron (ZOFRAN ODT) dispertab 4 mg  4 mg Enteral Tube Q4HRS PRN Eligio Del Castillo M.D.       • labetalol (NORMODYNE/TRANDATE) injection 10-20 mg  10-20 mg Intravenous Q4HRS PRN Eligio Del Castillo M.D.   20 mg at 12/11/21 0330   • MD Alert...Vancomycin per Pharmacy   Other PHARMACY TO DOSE Eligio Del Castillo M.D.       • cefTRIAXone (Rocephin) 2 g in  mL IVPB  2 g Intravenous Q24HRS Eligio Del Castillo M.D.   Stopped at 12/11/21 0609   •  Respiratory Therapy Consult   Nebulization Continuous RT Servando Rush M.D.       • MD Alert...ICU Electrolyte Replacement per Pharmacy   Other PHARMACY TO DOSE Servando Rush M.D.       • lidocaine (XYLOCAINE) 1 % injection 2 mL  2 mL Tracheal Tube Q30 MIN PRN Servando Rush M.D.       • HYDROmorphone (DILAUDID) 0.2 mg/mL in 50mL NS (Continuous Infusion)   Intravenous Continuous Eligio Del Castillo M.D.   Paused at 12/10/21 1400   • Pharmacy Consult Request ...Pain Management Review 1 Each  1 Each Other PHARMACY TO DOSE David Yoder M.D.       • ondansetron (ZOFRAN) syringe/vial injection 4 mg  4 mg Intravenous Q4HRS PRN David Yoder M.D.       • promethazine (PHENERGAN) suppository 12.5-25 mg  12.5-25 mg Rectal Q4HRS PRN David Yoder M.D.           Fluids    Intake/Output Summary (Last 24 hours) at 12/11/2021 1115  Last data filed at 12/11/2021 0800  Gross per 24 hour   Intake 1962.2 ml   Output 4365 ml   Net -2402.8 ml       Laboratory  Recent Labs     12/09/21  0312 12/10/21  0223   ISTATAPH 7.523* 7.497   ISTATAPCO2 43.7* 48.2*   ISTATAPO2 59* 94*   ISTATATCO2 37* 39*   [id number] 92* 98   ISTATARTHCO3 35.9* 37.4*   ISTATARTBE 12* 13*   ISTATTEMP 36.7 C 37.2 C   ISTATFIO2 50 60   ISTATSPEC Arterial Arterial   ISTATAPHTC 7.527* 7.494   [id number] 58* 96*         Recent Labs     12/09/21  0535 12/09/21  1255 12/10/21  0040 12/10/21  0510 12/11/21  0310   SODIUM 148*  --   --  150* 147*   POTASSIUM 3.8   < > 4.0 4.0 4.0   CHLORIDE 107  --   --  111 108   CO2 33  --   --  33 31   BUN 56*  --   --  44* 34*   CREATININE 1.14  --   --  0.73 0.56   MAGNESIUM 1.8  --   --  1.8 1.6   PHOSPHORUS 2.5  --   --  1.4* 1.9*   CALCIUM 8.3*  --   --  8.3* 8.1*    < > = values in this interval not displayed.     Recent Labs     12/09/21  0535 12/10/21  0510 12/11/21  0310   ALTSGPT 67* 85* 94*   ASTSGOT 68* 109* 105*   ALKPHOSPHAT 213* 242* 227*   TBILIRUBIN 0.5 0.6 0.6   GLUCOSE 236* 172* 183*     Recent  Labs     12/09/21  0535 12/10/21  0510 12/11/21  0310 12/11/21  0400   WBC 13.1* 15.9*  --  15.1*   NEUTSPOLYS 89.40* 91.90*  --  95.60*   LYMPHOCYTES 4.40* 1.80*  --  4.40*   MONOCYTES 0.90 0.00  --  0.00   EOSINOPHILS 0.90 2.70  --  0.00   BASOPHILS 0.00 0.00  --  0.00   ASTSGOT 68* 109* 105*  --    ALTSGPT 67* 85* 94*  --    ALKPHOSPHAT 213* 242* 227*  --    TBILIRUBIN 0.5 0.6 0.6  --      Recent Labs     12/09/21  0535 12/10/21  0510 12/11/21  0400   RBC 3.74* 3.62* 3.32*   HEMOGLOBIN 10.1* 9.6* 8.7*   HEMATOCRIT 33.1* 32.1* 29.7*   PLATELETCT 174 199 258       Imaging  X-Ray:  I have personally reviewed the images and compared with prior images. and My impression is: Unchanged left lower lobe opacities    Assessment/Plan  * Cardiac arrest (HCC)  Assessment & Plan  S/p PEA, asystole and ventricular tachycardia in ED - precipitated by hemorrhagic shock  ROSC after 7 rounds of CPR, IV epinephrine, IV bicarbonate solution and massive transfusion protocol  ECHO with RV dysfunction and RVSP of 50 mmHg  Neurologically improved, following but remains on ventilator    Atelectasis  Assessment & Plan  Patient sedated, body habitus and on ventilator all contributing  Monitor for the need for therapeutic bronchoscopy, last performed 12/8  Serial chest x-ray    Hypokalemia  Assessment & Plan  Replete potassium, ERP    Paroxysmal atrial fibrillation (HCC)  Assessment & Plan  Continue carvedilol, 25 mg twice daily  Optimize potassium and magnesium  [id number] score high, 10% risk CVA, will need anticoagulation when/if becomes clinically appropriate  PPx SQ heparin started 12/10 -consider heparin drip no bolus in a few days and transitioning to Eliquis after that?    Hypocalcemia  Assessment & Plan  Replete calcium, ERP    Hypomagnesemia  Assessment & Plan  Replete magnesium, ERP    Hemoperitoneum  Assessment & Plan  Surgery has evaluated  No indication for surgery at this time  Trend hemoglobin and transfuse PRBCs to keep  hemoglobin greater than 7  Unchanged hemoglobin times approximately 1 week  Anticoagulation still on hold  Status post IVC filter  Heparin PPx reinitiated 12/10, monitoring closely    Gastrointestinal hemorrhage  Assessment & Plan  History of gastrointestinal hemorrhage with no source identified-no recurrence/monitoring  Possible gastrointestinal hemorrhage provoked by IV heparin for pulmonary embolism - no gross blood noted in stool - positive for occult blood  Continue PPI twice daily  GI consult noted, if rebleeds consider GI consultation  Trend hemoglobin and transfuse PRBCs to keep hemoglobin greater than 7  Heparin subcu PPx restarted 12/10, consider without bolus initially and subsequently transitioned to Eliquis if tolerated?  Status post IVC filter off anticoagulation for A. fib    DVT (deep venous thrombosis) (HCC)  Assessment & Plan  Imaging reveals LUE DVT associated with her PICC line and a distal LLE DVT  Anticoagulation strictly contraindicated due to acute gastrointestinal hemorrhage and hemoperitoneum  IVC filter placement on 12/6  Also needs anticoagulation from a PE & PAF [id number] 2 risk score - 10%    Shock (HCC)- (present on admission)  Assessment & Plan  Hemorrhagic shock with acute gastrointestinal blood loss and vasodistributive shock  Shock has resolved, weaned off norepinephrine  Trending hemoglobin    Acute pancreatitis  Assessment & Plan  Resolved  She is having bowel movements  She is tolerating enteral tube feedings  Continue to monitor    PE (pulmonary thromboembolism) (HCC)- (present on admission)  Assessment & Plan  Anticoagulation is contraindicated  IVC filter placement on 12/6  No change in hemoglobin or significant GI bleeding in approximately 1 week on review with team 12/10  Started heparin 5000 units SQ PPx 12/10 - monitor for now    Acute blood loss anemia- (present on admission)  Assessment & Plan  Suspect gastrointestinal source of blood loss as well as acute  hemoperitoneum  Trend hemoglobin and transfuse PRBCs to keep hemoglobin greater than 7  Likely no recurrence, hemoglobin unchanged for approximately 7 days  Resume heparin SQ PPx-monitor closely, continue to hold full dose anticoagulation she needs for DVT/PE/PAF    Thrombocytopenia (HCC)- (present on admission)  Assessment & Plan  Trend platelet count and thromboelastogram with platelet mapping as needed  Transfuse platelets as required  Count normalized 12/9    Hemochromatosis- (present on admission)  Assessment & Plan  History of    Adrenal insufficiency ([place] disease) (HCC)- (present on admission)  Assessment & Plan  Continue hydrocortisone to 20 mg twice daily  Continue Florinef, 0.1 mg twice daily  Monitor for the need to bump hydrocortisone up with stress  Monitoring hemodynamics/electrolytes closely    Elevated LFTs- (present on admission)  Assessment & Plan  Due to ischemic hepatopathy from cardiac arrest and shock  Trend enzymes and synthetic function  Avoid hepatotoxins  Mildly trending up-continue to monitor    MRSA and E. coli sinusitis  Assessment & Plan  On outpatient IV antibiotics - LUE PICC in place  Continue vancomycin and ceftriaxone-6-week course of antibiotics planned per ID  monitor for secondary infections and antibiotic complications    Acute kidney injury (HCC)- (present on admission)  Assessment & Plan  Suspect ATN due to shock in lady with underlying atrophic kidneys  Monitor renal function and urine output  Avoid nephrotoxins and renal dose medications  Renal function continues to improve -> continue serial BMP    Primary hypertension- (present on admission)  Assessment & Plan  Goal SBP less than 160  Hydralazine and labetalol as necessary to achieve blood pressure goals-if frequently required adjust enteral regimen  Continue amiloride, 5 mg daily  Continue doxazosin, 2 mg daily  Continue carvedilol, 25 mg twice daily  Reassess daily    Hypothyroidism- (present on  admission)  Assessment & Plan  Continue levothyroxine and liothyronine  Repeat TSH in 4 weeks, sooner if significant arrhythmias occur without obvious etiology  Most recent TSH borderline suppressed    Obesity (BMI 35.0-39.9 without comorbidity)- (present on admission)  Assessment & Plan  Behavioral modification and weight loss to be encouraged when clinically appropriate  Hypothyroid on repletion therapy    Acute respiratory failure with hypoxia (HCC)- (present on admission)  Assessment & Plan  Intubated 12/2  RT protocols/ventilator bundle  SAT/SBT as appropriate  Mobility level 2-3  Pulmonary toilet, repeat TX FOB as needed  Forced diuresis ongoing  Serial chest x-ray       VTE:  Contraindicated  Ulcer: PPI  Lines: Central Line  Ongoing indication addressed and Perez Catheter  Ongoing indication addressed     I have performed a physical exam and reviewed and updated ROS and Plan today (12/11/2021). In review of yesterday's note (12/10/2021), there are no changes except as documented above.     I have assessed and reassessed her respiratory status with ventilator adjustments, airway mechanics, ventilator waveforms, blood pressure, hemodynamics, cardiovascular status as well as her neurologic status.  She is at increased risk for worsening respiratory and cardiovascular system dysfunction.    Discussed patient condition and risk of morbidity and/or mortality with RN, RT, Pharmacy, Charge nurse / hot rounds and Patient     The patient remains critically ill.  Critical care time = 40 minutes in directly providing and coordinating critical care and extensive data review.  No time overlap and excludes procedures.

## 2021-12-11 NOTE — PROGRESS NOTES
Pharmacy Vancomycin Kinetics Note for 12/11/2021     56 y.o. female on Vancomycin day # 10     Vancomycin Indication (Two level/Trough based Dosing): Skin/skin structure infection (goal trough 10-15)    Provider specified end date: 12/21/21    Active Antibiotics (From admission, onward)    Ordered     Ordering Provider       Sat Dec 11, 2021  3:38 PM    12/11/21 1538  vancomycin (VANCOCIN) 1,500 mg in  mL IVPB  (vancomycin (VANCOCIN) IV (LD + Maintenance))  EVERY EVENING         Alber Junior M.D.       Fri Dec 3, 2021  4:25 PM    12/03/21 1625  cefTRIAXone (Rocephin) 2 g in  mL IVPB  EVERY 24 HOURS         Eligio Del Castillo M.D.       Fri Dec 3, 2021  9:08 AM    12/03/21 0908  MD Alert...Vancomycin per Pharmacy  PHARMACY TO DOSE        Question:  Indication(s) for vancomycin?  Answer:  Other (comments)    Eligio Del Castillo M.D.          Dosing Weight:        Admission History: Admitted on 12/1/2021 for PE (pulmonary thromboembolism) ([facility]) [I26.99]  Shock ([facility]) [R57.9]  Pertinent history: Patient with history of sinusitis followed by [facility] and has been on multiple courses of antibiotics.  Most recent culture with E coli and MRSA.  Vancomycin and ceftriaxone initiated.    Allergies:     Ancef [cefazolin], Bactrim [sulfamethoxazole w-trimethoprim], Bee venom, Buprenorphine, Clindamycin, Contrast media with iodine [iodine], Doxycycline, Econazole, Flagyl  [metronidazole], Floxin [ofloxacin], Gadolinium derivatives, Hydrocodone-acetaminophen, Iodine, Keflex, Levofloxacin, Morphine, Naloxone, Nitrofurantoin, Nyquil, Paricalcitol, Penicillins, Tape, Tramadol, Vicks dayquil cold, Azithromycin, Bextra [valdecoxib], Linezolid, Adhesive remover [skin adhesives], Codeine, Sulfa drugs, and Tygacil [tigecycline]     Pertinent cultures to date:     Results     Procedure Component Value Units Date/Time    AFB Culture - BAL [[id number]] Collected: 12/08/21 1015    Order Status: Completed Specimen: Respirate  from Bronchoalveolar Lavage Updated: 12/10/21 2134     AFB Smear Results SEE NOTE     Comment: Negative for Acid Fast Bacteria.  Less than 5 mL of specimen received.  A minimum of 5 mL of sputum or fluid is recommended  for recovery of acid fast bacilli (AFB).  Volumes less than 5 mL are suboptimal and may  compromise recovery of AFB from culture.  Performed by [facility],  [address] [facility], [facility] [place] [address] [phone]  [email or web address], Irene Weems MD - Lab. Director          Preliminary Report SEE NOTE     Comment: Specimen received and in progress.  Positive culture results are called as soon as detected.  Final report to follow in seven to eight weeks  Performed by [facility],  [address] [facility], [facility] [place] [address] [phone]  [email or web address], Irene Weems MD - Lab. Director         Narrative:      Collected By: [id number] ASHLEY DEJESUS  BAL from LLL  Collected By: [id number] ASHLEY DEJESUS  Which Lobe (Bronch Only):->LLL    Quant Bronchial Washing [[id number]] Collected: 12/08/21 1015    Order Status: Completed Specimen: Respirate from Bronchoalveolar Lavage Updated: 12/10/21 1356     Significant Indicator NEG     Source RESP     Site BRONCHOALVEOLAR LAVAGE     Culture Result 13,500 cfu/mL usual upper respiratory derick  No clinically significant Staphylococcus aureus, Methicillin  Resistant Staphylococcus aureus, or Pseudomonas species  isolated.       Gram Stain Result Moderate WBCs.  Rare Yeast.      Narrative:      Collected By: [id number] ASHLEY DEJESUS  BAL from LLL  Collected By: [id number] ASHLEY DEJESUS  Which Lobe (Bronch Only):->LLL  Collected By: [id number] ASHLEY DEJESUS    Fungal Culture - BAL [[id number]] Collected: 12/08/21 1015    Order Status: Completed Specimen: Respirate from Bronchoalveolar Lavage Updated: 12/10/21 1356     Significant Indicator NEG     Source RESP     Site BRONCHOALVEOLAR LAVAGE     Culture Result Culture in progress.    Narrative:      Collected  "By: [id number] SOLITARIOAMBER DEJESUS  BAL from LLL  Collected By: [id number] SOLITARIOAMBER DEJESUS  Which Lobe (Bronch Only):->LLL  Collected By: [id number] SOLITARIOAMBER DEJESUS    Fungal Smear - BAL [[id number]] Collected: 12/08/21 1015    Order Status: Completed Specimen: Respirate from Bronchoalveolar Lavage Updated: 12/10/21 1356     Significant Indicator NEG     Source RESP     Site BRONCHOALVEOLAR LAVAGE     Fungal Smear Results Rare yeast seen.    Narrative:      Collected By: [id number] SOLITARIOAMBER DEJESUS  BAL from LLL  Collected By: [id number] SOLITARIOAMBER DEJESUS  Which Lobe (Bronch Only):->LLL  Collected By: [id number] SOLITARIOAMBER DEJESUS    GRAM STAIN [[id number]] Collected: 12/08/21 1015    Order Status: Completed Specimen: Respirate Updated: 12/09/21 0641     Significant Indicator .     Source RESP     Site BRONCHOALVEOLAR LAVAGE     Gram Stain Result Moderate WBCs.  Rare Yeast.      Narrative:      Collected By: [id number] SOLITARIOAMBER DEJESUS  BAL from LLL  Collected By: [id number] SOLITARIOAMBER DEJESUS  Which Lobe (Bronch Only):->LLL  Collected By: [id number] SOLITARIOAMBER DEJESUS    Culture Respiratory W/ Grm Stn - BAL [[id number]] Collected: 12/08/21 1015    Order Status: Canceled Specimen: Other from Bronchoalveolar Lavage     BLOOD CULTURE [[id number]] Collected: 12/01/21 2320    Order Status: Completed Specimen: Blood from Peripheral Updated: 12/07/21 0100     Significant Indicator NEG     Source BLD     Site PERIPHERAL     Culture Result No growth after 5 days of incubation.    Narrative:      2 of 2 blood culture x2  Sites order. Per Hospital Policy:  Only change Specimen Src: to \"Line\" if specified by physician  order.  Right AC    BLOOD CULTURE [[id number]] Collected: 12/01/21 1345    Order Status: Completed Specimen: Blood from Peripheral Updated: 12/06/21 1500     Significant Indicator NEG     Source BLD     Site PERIPHERAL     Culture Result No growth after 5 days of incubation.    Narrative:      1 of 2 for Blood " "Culture x 2 sites order. Per Hospital  Policy: Only change Specimen Src: to \"Line\" if specified by  physician order.  No site indicated    URINALYSIS CULTURE, IF INDICATED [[id number]]  (Abnormal) Collected: 21    Order Status: Completed Specimen: Urine Updated: 21     Color DK Yellow     Character Cloudy     Specific Gravity 1.030     Ph 5.0     Glucose Negative mg/dL      Ketones Negative mg/dL      Protein 30 mg/dL      Bilirubin Negative     Urobilinogen, Urine 0.2     Nitrite Negative     Leukocyte Esterase Trace     Occult Blood Trace     Micro Urine Req Microscopic    Narrative:      Indication for culture:->Patient WITHOUT an indwelling Perez  catheter in place with new onset of Dysuria, Frequency,  Urgency, and/or Suprapubic pain    URINALYSIS [[id number]]     Order Status: Canceled Specimen: Urine           Labs:     Estimated Creatinine Clearance: 126.2 mL/min (by C-G formula based on SCr of 0.56 mg/dL).  Recent Labs     21  0535 12/10/21  0510 21  0400   WBC 13.1* 15.9* 15.1*   NEUTSPOLYS 89.40* 91.90* 95.60*   BANDSSTABS 2.60  --   --      Recent Labs     21  0535 12/10/21  0510 21  0310   BUN 56* 44* 34*   CREATININE 1.14 0.73 0.56   ALBUMIN 2.6* 2.2* 2.2*       Intake/Output Summary (Last 24 hours) at 2021 1541  Last data filed at 2021 1400  Gross per 24 hour   Intake 1802.52 ml   Output 5375 ml   Net -3572.48 ml      /90   Pulse (!) 107   Temp 38 °C (100.4 °F) (Bladder)   Resp (!) 24   Ht 1.626 m (5' 4\")   Wt 98.3 kg (216 lb 11.4 oz)   SpO2 100%  Temp (24hrs), Av.2 °C (100.7 °F), Min:38 °C (100.4 °F), Max:38.3 °C (100.9 °F)      List concerns for Vancomycin clearance:     Obesity;BUN/Scr ratio greater than 20:1    Pharmacokinetics:     Trough kinetics:   Recent Labs     21  0310   VANCORANDOM 17.4       A/P:     -  Vancomycin dose: 1500 mg every 24 hours (1800)    -  Next vancomycin level(s):    - After 4th dose    -  " Comments: Renal function has stabilized and patient is now appropriate for scheduled vancomycin dosing.  End date 12/21.  Anticipate assessing one level then can complete therapy without trough monitoring unless change occurs in anticipated duration, renal function, or clinical status.    Meka Lanza, PharmD, BCCCP

## 2021-12-12 ENCOUNTER — APPOINTMENT (OUTPATIENT)
Dept: RADIOLOGY | Facility: MEDICAL CENTER | Age: 56
DRG: 981 | End: 2021-12-12
Attending: INTERNAL MEDICINE
Payer: MEDICARE

## 2021-12-12 LAB
ALBUMIN SERPL BCP-MCNC: 2.2 G/DL (ref 3.2–4.9)
ALBUMIN/GLOB SERPL: 0.8 G/DL
ALP SERPL-CCNC: 174 U/L (ref 30–99)
ALT SERPL-CCNC: 65 U/L (ref 2–50)
ANION GAP SERPL CALC-SCNC: 7 MMOL/L (ref 7–16)
AST SERPL-CCNC: 46 U/L (ref 12–45)
BASE EXCESS BLDA CALC-SCNC: 4 MMOL/L (ref -4–3)
BASE EXCESS BLDA CALC-SCNC: 9 MMOL/L (ref -4–3)
BASOPHILS # BLD AUTO: 0.2 % (ref 0–1.8)
BASOPHILS # BLD: 0.03 K/UL (ref 0–0.12)
BILIRUB SERPL-MCNC: 0.5 MG/DL (ref 0.1–1.5)
BODY TEMPERATURE: ABNORMAL CENTIGRADE
BODY TEMPERATURE: ABNORMAL DEGREES
BUN SERPL-MCNC: 26 MG/DL (ref 8–22)
CALCIUM SERPL-MCNC: 8.1 MG/DL (ref 8.5–10.5)
CHLORIDE SERPL-SCNC: 109 MMOL/L (ref 96–112)
CO2 BLDA-SCNC: 35 MMOL/L (ref 20–33)
CO2 SERPL-SCNC: 30 MMOL/L (ref 20–33)
CREAT SERPL-MCNC: 0.55 MG/DL (ref 0.5–1.4)
DELSYS IDSYS: ABNORMAL
EOSINOPHIL # BLD AUTO: 0.09 K/UL (ref 0–0.51)
EOSINOPHIL NFR BLD: 0.6 % (ref 0–6.9)
ERYTHROCYTE [DISTWIDTH] IN BLOOD BY AUTOMATED COUNT: 74 FL (ref 35.9–50)
GLOBULIN SER CALC-MCNC: 2.8 G/DL (ref 1.9–3.5)
GLUCOSE BLD-MCNC: 143 MG/DL (ref 65–99)
GLUCOSE BLD-MCNC: 174 MG/DL (ref 65–99)
GLUCOSE BLD-MCNC: 175 MG/DL (ref 65–99)
GLUCOSE BLD-MCNC: 202 MG/DL (ref 65–99)
GLUCOSE BLD-MCNC: 214 MG/DL (ref 65–99)
GLUCOSE SERPL-MCNC: 179 MG/DL (ref 65–99)
HCO3 BLDA-SCNC: 28 MMOL/L (ref 17–25)
HCO3 BLDA-SCNC: 33.4 MMOL/L (ref 17–25)
HCT VFR BLD AUTO: 27.6 % (ref 37–47)
HGB BLD-MCNC: 8.4 G/DL (ref 12–16)
HOROWITZ INDEX BLDA+IHG-RTO: 163 MM[HG]
IMM GRANULOCYTES # BLD AUTO: 0.65 K/UL (ref 0–0.11)
IMM GRANULOCYTES NFR BLD AUTO: 4.2 % (ref 0–0.9)
LYMPHOCYTES # BLD AUTO: 1.08 K/UL (ref 1–4.8)
LYMPHOCYTES NFR BLD: 6.9 % (ref 22–41)
MAGNESIUM SERPL-MCNC: 1.5 MG/DL (ref 1.5–2.5)
MCH RBC QN AUTO: 27 PG (ref 27–33)
MCHC RBC AUTO-ENTMCNC: 30.4 G/DL (ref 33.6–35)
MCV RBC AUTO: 88.7 FL (ref 81.4–97.8)
MODE IMODE: ABNORMAL
MONOCYTES # BLD AUTO: 0.79 K/UL (ref 0–0.85)
MONOCYTES NFR BLD AUTO: 5.1 % (ref 0–13.4)
NEUTROPHILS # BLD AUTO: 12.91 K/UL (ref 2–7.15)
NEUTROPHILS NFR BLD: 83 % (ref 44–72)
NRBC # BLD AUTO: 0.02 K/UL
NRBC BLD-RTO: 0.1 /100 WBC
O2/TOTAL GAS SETTING VFR VENT: 30 %
O2/TOTAL GAS SETTING VFR VENT: 38 % (ref 30–60)
PCO2 BLDA: 36.4 MMHG (ref 26–37)
PCO2 BLDA: 42 MMHG (ref 26–37)
PCO2 TEMP ADJ BLDA: 43.9 MMHG (ref 26–37)
PEEP END EXPIRATORY PRESSURE IPEEP: 8 CMH20
PERCENT MINUTE VOLUME IPMV: 140
PH BLDA: 7.5 [PH] (ref 7.4–7.5)
PH BLDA: 7.51 [PH] (ref 7.4–7.5)
PH TEMP ADJ BLDA: 7.49 [PH] (ref 7.4–7.5)
PHOSPHATE SERPL-MCNC: 2.4 MG/DL (ref 2.5–4.5)
PLATELET # BLD AUTO: 296 K/UL (ref 164–446)
PMV BLD AUTO: 12.4 FL (ref 9–12.9)
PO2 BLDA: 49 MMHG (ref 64–87)
PO2 BLDA: 56.6 MMHG (ref 64–87)
PO2 TEMP ADJ BLDA: 52 MMHG (ref 64–87)
POTASSIUM SERPL-SCNC: 3.5 MMOL/L (ref 3.6–5.5)
PROT SERPL-MCNC: 5 G/DL (ref 6–8.2)
RBC # BLD AUTO: 3.11 M/UL (ref 4.2–5.4)
SAO2 % BLDA: 87 % (ref 93–99)
SAO2 % BLDA: 87.2 % (ref 93–99)
SODIUM SERPL-SCNC: 146 MMOL/L (ref 135–145)
SPECIMEN DRAWN FROM PATIENT: ABNORMAL
WBC # BLD AUTO: 15.6 K/UL (ref 4.8–10.8)

## 2021-12-12 PROCEDURE — 71045 X-RAY EXAM CHEST 1 VIEW: CPT

## 2021-12-12 PROCEDURE — 0BJ08ZZ INSPECTION OF TRACHEOBRONCHIAL TREE, VIA NATURAL OR ARTIFICIAL OPENING ENDOSCOPIC: ICD-10-PCS | Performed by: INTERNAL MEDICINE

## 2021-12-12 PROCEDURE — 700105 HCHG RX REV CODE 258: Performed by: INTERNAL MEDICINE

## 2021-12-12 PROCEDURE — 80053 COMPREHEN METABOLIC PANEL: CPT

## 2021-12-12 PROCEDURE — 770022 HCHG ROOM/CARE - ICU (200)

## 2021-12-12 PROCEDURE — 94799 UNLISTED PULMONARY SVC/PX: CPT

## 2021-12-12 PROCEDURE — 82803 BLOOD GASES ANY COMBINATION: CPT | Mod: 91

## 2021-12-12 PROCEDURE — 85025 COMPLETE CBC W/AUTO DIFF WBC: CPT

## 2021-12-12 PROCEDURE — 94003 VENT MGMT INPAT SUBQ DAY: CPT

## 2021-12-12 PROCEDURE — 31645 BRNCHSC W/THER ASPIR 1ST: CPT | Performed by: INTERNAL MEDICINE

## 2021-12-12 PROCEDURE — A9270 NON-COVERED ITEM OR SERVICE: HCPCS | Performed by: INTERNAL MEDICINE

## 2021-12-12 PROCEDURE — 700101 HCHG RX REV CODE 250: Performed by: INTERNAL MEDICINE

## 2021-12-12 PROCEDURE — 700102 HCHG RX REV CODE 250 W/ 637 OVERRIDE(OP): Performed by: INTERNAL MEDICINE

## 2021-12-12 PROCEDURE — 83735 ASSAY OF MAGNESIUM: CPT

## 2021-12-12 PROCEDURE — 82962 GLUCOSE BLOOD TEST: CPT | Mod: 91

## 2021-12-12 PROCEDURE — 700111 HCHG RX REV CODE 636 W/ 250 OVERRIDE (IP): Performed by: INTERNAL MEDICINE

## 2021-12-12 PROCEDURE — 99291 CRITICAL CARE FIRST HOUR: CPT | Mod: 25 | Performed by: INTERNAL MEDICINE

## 2021-12-12 PROCEDURE — 84100 ASSAY OF PHOSPHORUS: CPT

## 2021-12-12 PROCEDURE — 700101 HCHG RX REV CODE 250: Performed by: STUDENT IN AN ORGANIZED HEALTH CARE EDUCATION/TRAINING PROGRAM

## 2021-12-12 PROCEDURE — 94150 VITAL CAPACITY TEST: CPT

## 2021-12-12 RX ORDER — MIDAZOLAM HYDROCHLORIDE 1 MG/ML
INJECTION INTRAMUSCULAR; INTRAVENOUS
Status: DISCONTINUED
Start: 2021-12-12 | End: 2021-12-12

## 2021-12-12 RX ORDER — MAGNESIUM SULFATE HEPTAHYDRATE 40 MG/ML
4 INJECTION, SOLUTION INTRAVENOUS ONCE
Status: COMPLETED | OUTPATIENT
Start: 2021-12-12 | End: 2021-12-12

## 2021-12-12 RX ADMIN — GABAPENTIN 200 MG: 100 CAPSULE ORAL at 06:01

## 2021-12-12 RX ADMIN — FLUDROCORTISONE ACETATE 0.1 MG: 0.1 TABLET ORAL at 06:01

## 2021-12-12 RX ADMIN — HEPARIN SODIUM 5000 UNITS: 5000 INJECTION, SOLUTION INTRAVENOUS; SUBCUTANEOUS at 22:27

## 2021-12-12 RX ADMIN — OXYCODONE HYDROCHLORIDE 10 MG: 10 TABLET ORAL at 09:47

## 2021-12-12 RX ADMIN — AMILORIDE HYDROCLORIDE 5 MG: 5 TABLET ORAL at 06:00

## 2021-12-12 RX ADMIN — GABAPENTIN 200 MG: 100 CAPSULE ORAL at 22:26

## 2021-12-12 RX ADMIN — GABAPENTIN 200 MG: 100 CAPSULE ORAL at 14:00

## 2021-12-12 RX ADMIN — CARVEDILOL 25 MG: 25 TABLET, FILM COATED ORAL at 06:01

## 2021-12-12 RX ADMIN — HEPARIN SODIUM 5000 UNITS: 5000 INJECTION, SOLUTION INTRAVENOUS; SUBCUTANEOUS at 14:00

## 2021-12-12 RX ADMIN — DOXAZOSIN 2 MG: 2 TABLET ORAL at 06:01

## 2021-12-12 RX ADMIN — OMEPRAZOLE 40 MG: KIT at 06:02

## 2021-12-12 RX ADMIN — HEPARIN SODIUM 5000 UNITS: 5000 INJECTION, SOLUTION INTRAVENOUS; SUBCUTANEOUS at 06:02

## 2021-12-12 RX ADMIN — ACETAMINOPHEN 650 MG: 325 TABLET, FILM COATED ORAL at 20:16

## 2021-12-12 RX ADMIN — DEXAMETHASONE 0.5 MG: 1 TABLET ORAL at 18:06

## 2021-12-12 RX ADMIN — HYDROMORPHONE HYDROCHLORIDE 0.5 MG: 1 INJECTION, SOLUTION INTRAMUSCULAR; INTRAVENOUS; SUBCUTANEOUS at 22:43

## 2021-12-12 RX ADMIN — FUROSEMIDE 20 MG: 10 INJECTION, SOLUTION INTRAMUSCULAR; INTRAVENOUS at 06:02

## 2021-12-12 RX ADMIN — OXYCODONE HYDROCHLORIDE 10 MG: 10 TABLET ORAL at 15:52

## 2021-12-12 RX ADMIN — FLUDROCORTISONE ACETATE 0.1 MG: 0.1 TABLET ORAL at 18:06

## 2021-12-12 RX ADMIN — CEFTRIAXONE SODIUM 2 G: 2 INJECTION, POWDER, FOR SOLUTION INTRAMUSCULAR; INTRAVENOUS at 06:02

## 2021-12-12 RX ADMIN — DEXAMETHASONE 0.5 MG: 1 TABLET ORAL at 06:01

## 2021-12-12 RX ADMIN — POTASSIUM PHOSPHATE, MONOBASIC AND POTASSIUM PHOSPHATE, DIBASIC 15 MMOL: 224; 236 INJECTION, SOLUTION, CONCENTRATE INTRAVENOUS at 07:32

## 2021-12-12 RX ADMIN — LEVOTHYROXINE SODIUM 75 MCG: 0.07 TABLET ORAL at 06:01

## 2021-12-12 RX ADMIN — MAGNESIUM SULFATE HEPTAHYDRATE 4 G: 40 INJECTION, SOLUTION INTRAVENOUS at 07:32

## 2021-12-12 RX ADMIN — LOSARTAN POTASSIUM 100 MG: 50 TABLET, FILM COATED ORAL at 06:00

## 2021-12-12 RX ADMIN — OMEPRAZOLE 40 MG: KIT at 18:06

## 2021-12-12 RX ADMIN — HYDROMORPHONE HYDROCHLORIDE 1 MG: 1 INJECTION, SOLUTION INTRAMUSCULAR; INTRAVENOUS; SUBCUTANEOUS at 10:50

## 2021-12-12 RX ADMIN — HYDROMORPHONE HYDROCHLORIDE 1 MG: 1 INJECTION, SOLUTION INTRAMUSCULAR; INTRAVENOUS; SUBCUTANEOUS at 12:42

## 2021-12-12 RX ADMIN — HYDROCORTISONE 10 MG: 20 TABLET ORAL at 18:07

## 2021-12-12 RX ADMIN — DEXMEDETOMIDINE 0.6 MCG/KG/HR: 200 INJECTION, SOLUTION INTRAVENOUS at 03:34

## 2021-12-12 RX ADMIN — OXYCODONE HYDROCHLORIDE 10 MG: 10 TABLET ORAL at 20:15

## 2021-12-12 RX ADMIN — LIOTHYRONINE SODIUM 25 MCG: 25 TABLET ORAL at 06:00

## 2021-12-12 RX ADMIN — VANCOMYCIN HYDROCHLORIDE 1500 MG: 500 INJECTION, POWDER, LYOPHILIZED, FOR SOLUTION INTRAVENOUS at 18:07

## 2021-12-12 RX ADMIN — DOCUSATE SODIUM 50 MG AND SENNOSIDES 8.6 MG 2 TABLET: 8.6; 5 TABLET, FILM COATED ORAL at 06:01

## 2021-12-12 RX ADMIN — FUROSEMIDE 20 MG: 10 INJECTION, SOLUTION INTRAMUSCULAR; INTRAVENOUS at 15:51

## 2021-12-12 RX ADMIN — HYDROCORTISONE 20 MG: 20 TABLET ORAL at 06:01

## 2021-12-12 RX ADMIN — CARVEDILOL 25 MG: 25 TABLET, FILM COATED ORAL at 18:06

## 2021-12-12 ASSESSMENT — PAIN DESCRIPTION - PAIN TYPE
TYPE: ACUTE PAIN;CHRONIC PAIN
TYPE: ACUTE PAIN
TYPE: ACUTE PAIN
TYPE: ACUTE PAIN;CHRONIC PAIN
TYPE: ACUTE PAIN
TYPE: ACUTE PAIN;CHRONIC PAIN
TYPE: ACUTE PAIN
TYPE: ACUTE PAIN;CHRONIC PAIN
TYPE: ACUTE PAIN

## 2021-12-12 ASSESSMENT — PULMONARY FUNCTION TESTS: FVC: .7

## 2021-12-12 NOTE — PROCEDURES
[facility]    Date of service:  12/12/2021    PROCEDURE:  Therapeutic bronchoscopy -> Diagnostic FOB     INDICATIONS:  Therapeutically clear inspisated secretions/plugs to facilitate extubation, obtain bronchial wash and bronchial alveolar lavage specimens for diagnostic studies as needed.  X-ray with significant bibasilar atelectasis refractory to usual treatment.    POST-OP DIAGNOSIS:  Acute hypoxemic respiratory failure, atlectasis -> no major endobronchial mucous plugs.    Timeout observed     DESCRIPTION OF PROCEDURE:  The patient has been intubated on full ventilator support.   No sedation given for the procedure to facilitate extubation post FOB eval.  Hemodynamics were acceptable throughout the procedure.  The patient was placed on 100% O2 for the procedure.  O2 saturations were 98% throughout the procedure.  The flexible fiberoptic bronchoscope was inserted through the adaptor inline with the endotracheal tube without difficulty.  All airways were examined at the subsegmental level.  No endobronchial masses or anatomic variants were identified there was some dynamic airway collapse.  Airway mucosa was mildly inflamed diffusely there were no significant secretions.      No specimens

## 2021-12-12 NOTE — CARE PLAN
The patient is Watcher - Medium risk of patient condition declining or worsening    Shift Goals  Clinical Goals: extubation, pain management, mobility to the edge of the bed, adequate oxygenation, BP control   Patient Goals: improvement   Family Goals: improvement     Problem: Pain - Standard  Goal: Alleviation of pain or a reduction in pain to the patient’s comfort goal  12/11/2021 1924 by Frankie Slaughter R.N.  Outcome: Progressing  Utilizing PRNs for pain management.      Problem: Knowledge Deficit - Standard  Goal: Patient and family/care givers will demonstrate understanding of plan of care, disease process/condition, diagnostic tests and medications  12/11/2021 1924 by Frankie Slaugther R.N.  Outcome: Progressing  Education to patient, patient's spouse, patient's son about ICU progression. Patient educated on doing leg and arm exercises in the bed.     Problem: Skin Integrity  Goal: Skin integrity is maintained or improved  12/11/2021 1924 by Frankie Slaughter R.N.  Outcome: Progressing  Q2h turns, mobility to edge of the bed. Barrier paste. Waffle cushion. BMS in place.    Attempted to stand, patient dizzy with general weakness. Will attempt again in the AM. ICU bed in chair position for 2.5 hrs.

## 2021-12-12 NOTE — PROGRESS NOTES
Critical Care Progress Note    Date of admission  12/1/2021    Chief Complaint  56 y.o. female admitted 12/1/2021 with acute pancreatitis, pulmonary embolism.  She has a history of prior gastrointestinal bleeding from unknown source, MRSA sinusitis on outpatient antibiotics, [place]'s disease, GERD, primary hypertension, hemochromatosis, hypothyroidism, traumatic brain injury, myocardial infarction and fibromyalgia.    Hospital Course    12/2 -    vent day 1.  Trend hemoglobin and platelet count and transfuse blood products as required.  Full vent support.  Inhaled Flolan for RV dysfunction following cardiac arrest.  12/3 -    vent day 2.  Start bicarbonate drip for MARY LOU.  Titrating norepinephrine.  Wean off Flolan.  12/4 -    vent day 3.  Renal function worse.  Titrating norepinephrine.  12/5 -    vent day 4.  Blood pressure increased.  Change dexmedetomidine to propofol.  12/6 -    vent day 5.  Arrange for IVC filter placement.  Nephrology on board.  12/7 -    vent day 6.  IVC filter placed yesterday.  Renal function improving.  Taper hydrocortisone.  12/8 -    vent day 7.  Improving renal function.  Taper hydrocortisone.  12/9 -    vent day 8.  Start NPH.  Change propofol to dexmedetomidine.  12/10 - V#9, titrating norepinephrine-off, dexmedetomidine, FiO2 increased to 0.6, start lasix  12/11 - V#10, more alert, off pressors, SAT/SBT ongoing, FiO2 weaned to 0.4  12/12 -  V#11, SAT/SBT ongoing, poor weans yesterday-no change today but R SBI normal, O2 30%, neg 3.9L, TX FOB without significant secretions, liberation trial    Interval Problem Update  Reviewed last 24 hour events:    Awake and follow  Off DEX  SR /ST   SBp 100-150s  UO great  30%, PEEP 8  CXR ATX BB, unchanged  Neg 21, , RSBI 42  Secretions min  [place] meds  VANCO/C3 12/21    Case reviewed with patient and her  at length at the bedside  ABG obtained, hypercarbia compensated with normal pH  RSBI remains acceptable with a couple hour  trial on CPAP    Consented  and patient for therapeutic bronchoscopy, FOB to clear any mucous plugs to facilitate keeping the patient off the ventilator.  Also reviewed the possibility of BiPAP for ROSSI-like symptoms tonight or just for rest from respiratory failure and patient nodded that she was not claustrophobic and would be willing to try BiPAP as needed.  Outlined aggressive pulmonary toilet and mobilization as well.    Bronchoscopy revealed some dynamic airway collapse and mild erythema of the airways but no significant mucous plugs to account for the fairly significant atelectasis on the chest x-ray.    Liberation trial to begin, push incentive spirometry, early mobilization and aggressive pulmonary toilet       YESTERDAY  DEX 0.4 -> off  Follows, more alert, abd pain better  PEEP 8, 40%   CXR slight improved vascular plethora but persistent low volumes & BBLLA  Lasix - good response edema?  SSLLA persists  UO excellent, -1.89L last 24 HRS  SR/ST  -160  One PRN labetalol last night  T-max 100.9  WBC slight better 15.1  Home PPI  Hemoglobin down to 8.7, no bleeding clinically-monitoring  PPx SQ heparin started yesterday  Sodium 147, improved  B/C 34/0.56-both improved  Phos 1.9  Magnesium 1.6  Isela [facility]  SSI noted    Up NPH to 10 bid  SAT/SBT, if tolerated check weaning parameters, if good check ABG  Liberation trial?  Replete phosphorus/magnesium  Continue to monitor hemoglobin  Continue Lasix, may need more free water give with phosphorus repletion IV    Very poor weaning parameters, will rest patient and continue diuresis and repeat SAT/SBT in early p.m.    Beginning to review massive home medication list with pharmacy and reassess daily on what is appropriate to resume    Review of Systems  Review of Systems   Unable to perform ROS: Acuity of condition   Remains on ventilator    Vital Signs for last 24 hours   Temp:  [37.8 °C (100 °F)-38.2 °C (100.8 °F)] 38.2 °C (100.8 °F)  Pulse:   [] 105  Resp:  [17-29] 23  BP: (105-171)/() 113/75  SpO2:  [90 %-100 %] 94 %    Hemodynamic parameters for last 24 hours       Respiratory Information for the last 24 hours  Vent Mode: Spont  PEEP/CPAP: 8  MAP: 13  Length of Weaning Trial (Hours): 1  Control VTE (exp VT): 311    Physical Exam   Physical Exam  Constitutional:       Appearance: She is ill-appearing (Improved). She is not toxic-appearing or diaphoretic.      Interventions: She is sedated, intubated and restrained.   HENT:      Head: Normocephalic.      Nose: Nose normal.      Mouth/Throat:      Mouth: Mucous membranes are moist.      Pharynx: Oropharynx is clear.   Eyes:      General: No scleral icterus.     Conjunctiva/sclera: Conjunctivae normal.      Pupils: Pupils are equal, round, and reactive to light.   Cardiovascular:      Rate and Rhythm: Normal rate and regular rhythm.      Pulses: Normal pulses.      Heart sounds: No murmur heard.  No gallop.       Comments: SR  Pulmonary:      Effort: No accessory muscle usage. She is intubated.      Breath sounds: Examination of the right-lower field reveals decreased breath sounds. Examination of the left-lower field reveals decreased breath sounds. Decreased breath sounds and rales (Improved) present. No wheezing.   Abdominal:      General: Bowel sounds are normal. There is no distension.      Palpations: Abdomen is soft.      Tenderness: There is no abdominal tenderness. There is no right CVA tenderness or left CVA tenderness.      Comments: Tympanetic-improved, Tolerating enteral tube feedings   Genitourinary:     Comments: Perez  Musculoskeletal:      Cervical back: Normal range of motion.      Right lower leg: Edema (Improved) present.      Left lower leg: Edema (Improved) present.      Comments: No clubbing or cyanosis   Skin:     General: Skin is warm.      Capillary Refill: Capillary refill takes less than 2 seconds.      Coloration: Skin is not cyanotic or mottled.      Nails: There  is no clubbing.   Neurological:      General: No focal deficit present.      Mental Status: She is lethargic.      GCS: GCS eye subscore is 4. GCS verbal subscore is 1. GCS motor subscore is 6.      Cranial Nerves: Cranial nerves are intact.      Comments: Arouses, nods and follows   Psychiatric:      Comments: Unable to completely assess         Medications  Current Facility-Administered Medications   Medication Dose Route Frequency Provider Last Rate Last Admin   • losartan (COZAAR) tablet 100 mg  100 mg Enteral Tube Q DAY Alber Junior M.D.   100 mg at 12/12/21 0600   • insulin NPH (HumuLIN N,NovoLIN N) injection  10 Units Subcutaneous BID Alber Junior M.D.   10 Units at 12/12/21 0612   • acetaminophen (Tylenol) tablet 650 mg  650 mg Enteral Tube Q4HRS PRN Alber Junior M.D.   650 mg at 12/11/21 1027   • vancomycin (VANCOCIN) 1,500 mg in  mL IVPB  15 mg/kg Intravenous Q EVENING Alber Junior M.D.   Stopped at 12/11/21 2133   • heparin injection 5,000 Units  5,000 Units Subcutaneous Q8HRS Alber Junior M.D.   5,000 Units at 12/12/21 1400   • furosemide (LASIX) injection 20 mg  20 mg Intravenous BID DIURETIC Charlie Stevens M.D.   20 mg at 12/12/21 0602   • dexmedetomidine (PRECEDEX) 400 mcg/100mL NS premix infusion  0.1-1.5 mcg/kg/hr Intravenous Continuous Martha Mirza M.D.   Paused at 12/12/21 0600   • hydrocortisone (CORTEF) tablet 20 mg  20 mg Enteral Tube DAILY Eligio Del Castillo M.D.   20 mg at 12/12/21 0601    And   • hydrocortisone (CORTEF) tablet 10 mg  10 mg Enteral Tube Nightly Eligio Del Castillo M.D.   10 mg at 12/11/21 1826   • dexamethasone (DECADRON) tablet 0.5 mg  0.5 mg Enteral Tube Q12HRS Eligio Del Castillo M.D.   0.5 mg at 12/12/21 0601   • gabapentin (NEURONTIN) capsule 200 mg  200 mg Enteral Tube Q8HRS Eligio Del Castillo M.D.   200 mg at 12/12/21 1400   • oxyCODONE immediate-release (ROXICODONE) tablet 5 mg  5 mg Enteral Tube Q4HRS PRN  Eligio Del Castillo M.D.   5 mg at 12/09/21 0417    Or   • oxyCODONE immediate release (ROXICODONE) tablet 10 mg  10 mg Enteral Tube Q4HRS PRN Eligio Del Castillo M.D.   10 mg at 12/12/21 0947   • HYDROmorphone (Dilaudid) injection 0.5-1 mg  0.5-1 mg Intravenous Q HOUR PRN Eligio Del Castillo M.D.   1 mg at 12/12/21 1242   • carvedilol (COREG) tablet 25 mg  25 mg Enteral Tube BID Eligio Del Castillo M.D.   25 mg at 12/12/21 0601   • doxazosin (CARDURA) tablet 2 mg  2 mg Enteral Tube DAILY Eligio Del Castillo M.D.   2 mg at 12/12/21 0601   • AMILoride (MIDAMOR) tablet 5 mg  5 mg Enteral Tube Q DAY Eligio Del Castillo M.D.   5 mg at 12/12/21 0600   • hydrALAZINE (APRESOLINE) injection 20 mg  20 mg Intravenous Q4HRS PRN Eligio Del Castillo M.D.   20 mg at 12/09/21 0115   • insulin regular (HumuLIN R,NovoLIN R) injection  3-14 Units Subcutaneous Q6HRS Eligio Del Castillo M.D.   4 Units at 12/12/21 1307    And   • dextrose 50% (D50W) injection 50 mL  50 mL Intravenous Q15 MIN PRN Eligio Del Castillo M.D.       • liothyronine (CYTOMEL) tablet 25 mcg  25 mcg Enteral Tube QAM AC Eligio Del Castillo M.D.   25 mcg at 12/12/21 0600   • levothyroxine (SYNTHROID) tablet 75 mcg  75 mcg Enteral Tube AM ES Eligio Del Castillo M.D.   75 mcg at 12/12/21 0601   • omeprazole (FIRST-OMEPRAZOLE) 2 mg/mL oral susp 40 mg  40 mg Enteral Tube Q12HRS Eligio Del Castillo M.D.   40 mg at 12/12/21 0602   • fludrocortisone (FLORINEF) tablet 0.1 mg  0.1 mg Enteral Tube BID Eligio Del Castillo M.D.   0.1 mg at 12/12/21 0601   • Pharmacy Consult: Enteral tube insertion - review meds/change route/product selection  1 Each Other PHARMACY TO DOSE Eligio Del Castillo M.D.       • senna-docusate (PERICOLACE or SENOKOT S) 8.6-50 MG per tablet 2 Tablet  2 Tablet Enteral Tube BID Eligio Del Castillo M.D.   2 Tablet at 12/12/21 0601    And   • polyethylene glycol/lytes (MIRALAX) PACKET 1 Packet  1 Packet Enteral  Tube QDAY PRN Eligio Del Castillo M.D.        And   • magnesium hydroxide (MILK OF MAGNESIA) suspension 30 mL  30 mL Enteral Tube QDAY PRN Eligio Del Castillo M.D.        And   • bisacodyl (DULCOLAX) suppository 10 mg  10 mg Rectal QDAY PRN Eligio Del Castillo M.D.       • ondansetron (ZOFRAN ODT) dispertab 4 mg  4 mg Enteral Tube Q4HRS PRN Eligio Del Castillo M.D.       • labetalol (NORMODYNE/TRANDATE) injection 10-20 mg  10-20 mg Intravenous Q4HRS PRN Eligio Del Castillo M.D.   20 mg at 12/11/21 0330   • MD Alert...Vancomycin per Pharmacy   Other PHARMACY TO DOSE Eligio Del Castillo M.D.       • cefTRIAXone (Rocephin) 2 g in  mL IVPB  2 g Intravenous Q24HRS Eligio Del Castillo M.D.   Stopped at 12/12/21 0632   • Respiratory Therapy Consult   Nebulization Continuous RT Servando Rush M.D.       • MD Alert...ICU Electrolyte Replacement per Pharmacy   Other PHARMACY TO DOSE Servando Rush M.D.       • lidocaine (XYLOCAINE) 1 % injection 2 mL  2 mL Tracheal Tube Q30 MIN PRN Servando Rush M.D.       • Pharmacy Consult Request ...Pain Management Review 1 Each  1 Each Other PHARMACY TO DOSE David Yoder M.D.       • ondansetron (ZOFRAN) syringe/vial injection 4 mg  4 mg Intravenous Q4HRS PRN David Yoder M.D.   4 mg at 12/11/21 2314   • promethazine (PHENERGAN) suppository 12.5-25 mg  12.5-25 mg Rectal Q4HRS PRN David Yoder M.D.           Fluids    Intake/Output Summary (Last 24 hours) at 12/12/2021 1542  Last data filed at 12/12/2021 1200  Gross per 24 hour   Intake 2139.84 ml   Output 5300 ml   Net -3160.16 ml       Laboratory  Recent Labs     12/10/21  0223 12/12/21  1246 12/12/21  1253   NBDMR98F  --  7.50  --    GBIDZT471R  --  36.4  --    [id number]  --  56.6*  --    DQSG2XYK  --  87.2*  --    ARTHCO3  --  28*  --    FIO2  --  38  --    ARTBE  --  4*  --    ISTATAPH 7.497  --  7.508*   ISTATAPCO2 48.2*  --  42.0*   ISTATAPO2 94*  --  49*   ISTATATCO2 39*  --  35*    NJIUPTD1JJP 98  --  87*   ISTATARTHCO3 37.4*  --  33.4*   ISTATARTBE 13*  --  9*   ISTATTEMP 37.2 C  --  38.0 C   ISTATFIO2 60  --  30   ISTATSPEC Arterial  --  Arterial   ISTATAPHTC 7.494  --  7.492   BYJIMLZT4XA 96*  --  52*         Recent Labs     12/10/21  0510 12/11/21  0310 12/12/21  0415   SODIUM 150* 147* 146*   POTASSIUM 4.0 4.0 3.5*   CHLORIDE 111 108 109   CO2 33 31 30   BUN 44* 34* 26*   CREATININE 0.73 0.56 0.55   MAGNESIUM 1.8 1.6 1.5   PHOSPHORUS 1.4* 1.9* 2.4*   CALCIUM 8.3* 8.1* 8.1*     Recent Labs     12/10/21  0510 12/11/21  0310 12/12/21  0415   ALTSGPT 85* 94* 65*   ASTSGOT 109* 105* 46*   ALKPHOSPHAT 242* 227* 174*   TBILIRUBIN 0.6 0.6 0.5   GLUCOSE 172* 183* 179*     Recent Labs     12/10/21  0510 12/11/21  0310 12/11/21  0400 12/12/21  0415   WBC 15.9*  --  15.1* 15.6*   NEUTSPOLYS 91.90*  --  95.60* 83.00*   LYMPHOCYTES 1.80*  --  4.40* 6.90*   MONOCYTES 0.00  --  0.00 5.10   EOSINOPHILS 2.70  --  0.00 0.60   BASOPHILS 0.00  --  0.00 0.20   ASTSGOT 109* 105*  --  46*   ALTSGPT 85* 94*  --  65*   ALKPHOSPHAT 242* 227*  --  174*   TBILIRUBIN 0.6 0.6  --  0.5     Recent Labs     12/10/21  0510 12/11/21  0400 12/12/21  0415   RBC 3.62* 3.32* 3.11*   HEMOGLOBIN 9.6* 8.7* 8.4*   HEMATOCRIT 32.1* 29.7* 27.6*   PLATELETCT 199 258 296       Imaging  X-Ray:  I have personally reviewed the images and compared with prior images. and My impression is: Unchanged left lower lobe opacities    Assessment/Plan  * Cardiac arrest (HCC)  Assessment & Plan  S/p PEA, asystole and ventricular tachycardia in ED - precipitated by hemorrhagic shock  ROSC after 7 rounds of CPR, IV epinephrine, IV bicarbonate solution and massive transfusion protocol  ECHO with RV dysfunction and RVSP of 50 mmHg  Neurologically improved, following but remains on ventilator    Atelectasis  Assessment & Plan  Patient sedated, body habitus and on ventilator all contributing  Monitor for the need for therapeutic bronchoscopy, last  performed 12/8  Serial chest x-ray    Hypokalemia  Assessment & Plan  Replete potassium, ERP    Paroxysmal atrial fibrillation (HCC)  Assessment & Plan  Continue carvedilol, 25 mg twice daily  Optimize potassium and magnesium  [id number] score high, 10% risk CVA, will need anticoagulation when/if becomes clinically appropriate  PPx SQ heparin started 12/10 -consider heparin drip no bolus in a few days and transitioning to Eliquis after that?    Hypocalcemia  Assessment & Plan  Replete calcium, ERP    Hypomagnesemia  Assessment & Plan  Replete magnesium, ERP    Hemoperitoneum  Assessment & Plan  Surgery has evaluated  No indication for surgery at this time  Trend hemoglobin and transfuse PRBCs to keep hemoglobin greater than 7  Unchanged hemoglobin times approximately 1 week  Anticoagulation still on hold  Status post IVC filter  Heparin PPx reinitiated 12/10, monitoring closely    Gastrointestinal hemorrhage  Assessment & Plan  History of gastrointestinal hemorrhage with no source identified-no recurrence/monitoring  Possible gastrointestinal hemorrhage provoked by IV heparin for pulmonary embolism - no gross blood noted in stool - positive for occult blood  Continue PPI twice daily  GI consult noted, if rebleeds consider GI consultation  Trend hemoglobin and transfuse PRBCs to keep hemoglobin greater than 7  Heparin subcu PPx restarted 12/10, consider without bolus initially and subsequently transitioned to Eliquis if tolerated?  Status post IVC filter off anticoagulation for A. fib    DVT (deep venous thrombosis) ([facility])  Assessment & Plan  Imaging reveals LUE DVT associated with her PICC line and a distal LLE DVT  Anticoagulation strictly contraindicated due to acute gastrointestinal hemorrhage and hemoperitoneum  IVC filter placement on 12/6  Also needs anticoagulation from a PE & PAF [id number] 2 risk score - 10%    Shock (HCC)- (present on admission)  Assessment & Plan  Hemorrhagic shock with acute  gastrointestinal blood loss and vasodistributive shock  Shock has resolved, weaned off norepinephrine  Trending hemoglobin    Acute pancreatitis  Assessment & Plan  Resolved  She is having bowel movements  She is tolerating enteral tube feedings  Continue to monitor    PE (pulmonary thromboembolism) (HCC)- (present on admission)  Assessment & Plan  Anticoagulation is contraindicated  IVC filter placement on 12/6  No change in hemoglobin or significant GI bleeding in approximately 1 week on review with team 12/10  Started heparin 5000 units SQ PPx 12/10 - monitor for now    Acute blood loss anemia- (present on admission)  Assessment & Plan  Suspect gastrointestinal source of blood loss as well as acute hemoperitoneum  Trend hemoglobin and transfuse PRBCs to keep hemoglobin greater than 7  Likely no recurrence, hemoglobin unchanged for approximately 7 days  Resume heparin SQ PPx-monitor closely, continue to hold full dose anticoagulation she needs for DVT/PE/PAF    Thrombocytopenia (HCC)- (present on admission)  Assessment & Plan  Trend platelet count and thromboelastogram with platelet mapping as needed  Transfuse platelets as required  Count normalized 12/9    Hemochromatosis- (present on admission)  Assessment & Plan  History of    Adrenal insufficiency (Valent[place]'s disease) (HCC)- (present on admission)  Assessment & Plan  Continue hydrocortisone to 20 mg twice daily  Continue Florinef, 0.1 mg twice daily  Monitor for the need to bump hydrocortisone up with stress  Monitoring hemodynamics/electrolytes closely    Elevated LFTs- (present on admission)  Assessment & Plan  Due to ischemic hepatopathy from cardiac arrest and shock  Trend enzymes and synthetic function  Avoid hepatotoxins  Mildly trending up-continue to monitor    MRSA and E. coli sinusitis  Assessment & Plan  On outpatient IV antibiotics - LUE PICC in place  Continue vancomycin and ceftriaxone-6-week course of antibiotics planned per ID  monitor for  secondary infections and antibiotic complications    Acute kidney injury (HCC)- (present on admission)  Assessment & Plan  Suspect ATN due to shock in lady with underlying atrophic kidneys  Monitor renal function and urine output  Avoid nephrotoxins and renal dose medications  Renal function continues to improve -> continue serial BMP    Primary hypertension- (present on admission)  Assessment & Plan  Goal SBP less than 160  Hydralazine and labetalol as necessary to achieve blood pressure goals-if frequently required adjust enteral regimen  Continue amiloride, 5 mg daily  Continue doxazosin, 2 mg daily  Continue carvedilol, 25 mg twice daily  Reassess daily    Hypothyroidism- (present on admission)  Assessment & Plan  Continue levothyroxine and liothyronine  Repeat TSH in 4 weeks, sooner if significant arrhythmias occur without obvious etiology  Most recent TSH borderline suppressed    Obesity (BMI 35.0-39.9 without comorbidity)- (present on admission)  Assessment & Plan  Behavioral modification and weight loss to be encouraged when clinically appropriate  Hypothyroid on repletion therapy    Acute respiratory failure with hypoxia (HCC)- (present on admission)  Assessment & Plan  Intubated 12/2  RT protocols/ventilator bundle  SAT/SBT as appropriate  Mobility level 2-3  Pulmonary toilet, repeat TX FOB as needed  Forced diuresis ongoing - dose with diamox?  Serial chest x-ray  Liberation trial after Tx FOB for BB ATX, no sedation  Aggressive pulmonary toilet  BiPAP for support post extubation as needed       VTE:  Contraindicated  Ulcer: PPI  Lines: Central Line  Ongoing indication addressed and Perez Catheter  Ongoing indication addressed     I have performed a physical exam and reviewed and updated ROS and Plan today (12/12/2021). In review of yesterday's note (12/11/2021), there are no changes except as documented above.     I have assessed and reassessed her respiratory status with ventilator adjustments, airway  mechanics, ventilator waveforms, blood pressure, hemodynamics, cardiovascular status as well as her neurologic status.  She is at increased risk for worsening respiratory and cardiovascular system dysfunction.    Discussed patient condition and risk of morbidity and/or mortality with RN, RT, Pharmacy, Charge nurse / hot rounds and Patient     The patient remains critically ill.  Critical care time = 45 minutes in directly providing and coordinating critical care and extensive data review.  No time overlap and excludes procedures.

## 2021-12-12 NOTE — CARE PLAN
The patient is Watcher - Medium risk of patient condition declining or worsening    Shift Goals  Clinical Goals: extubation, pain management, mobility to the edge of the bed, adequate oxygenation, BP control   Patient Goals: improvement   Family Goals: improvement     Progress made toward(s) clinical / shift goals:    Problem: Pain - Standard  Goal: Alleviation of pain or a reduction in pain to the patient’s comfort goal  Outcome: Progressing     Problem: Knowledge Deficit - Standard  Goal: Patient and family/care givers will demonstrate understanding of plan of care, disease process/condition, diagnostic tests and medications  Outcome: Progressing     Problem: Skin Integrity  Goal: Skin integrity is maintained or improved  Outcome: Progressing     Problem: Fall Risk  Goal: Patient will remain free from falls  Outcome: Progressing       Patient is not progressing towards the following goals:

## 2021-12-13 ENCOUNTER — APPOINTMENT (OUTPATIENT)
Dept: RADIOLOGY | Facility: MEDICAL CENTER | Age: 56
DRG: 981 | End: 2021-12-13
Attending: INTERNAL MEDICINE
Payer: MEDICARE

## 2021-12-13 LAB
ALBUMIN SERPL BCP-MCNC: 2.3 G/DL (ref 3.2–4.9)
ALBUMIN/GLOB SERPL: 0.7 G/DL
ALP SERPL-CCNC: 167 U/L (ref 30–99)
ALT SERPL-CCNC: 56 U/L (ref 2–50)
ANION GAP SERPL CALC-SCNC: 8 MMOL/L (ref 7–16)
APTT PPP: 31.7 SEC (ref 24.7–36)
AST SERPL-CCNC: 36 U/L (ref 12–45)
BASE EXCESS BLDA CALC-SCNC: 10 MMOL/L (ref -4–3)
BASOPHILS # BLD AUTO: 0.2 % (ref 0–1.8)
BASOPHILS # BLD: 0.03 K/UL (ref 0–0.12)
BILIRUB SERPL-MCNC: 0.5 MG/DL (ref 0.1–1.5)
BODY TEMPERATURE: ABNORMAL DEGREES
BUN SERPL-MCNC: 24 MG/DL (ref 8–22)
CALCIUM SERPL-MCNC: 8.5 MG/DL (ref 8.5–10.5)
CHLORIDE SERPL-SCNC: 104 MMOL/L (ref 96–112)
CO2 BLDA-SCNC: 34 MMOL/L (ref 20–33)
CO2 SERPL-SCNC: 29 MMOL/L (ref 20–33)
CREAT SERPL-MCNC: 0.49 MG/DL (ref 0.5–1.4)
DELSYS IDSYS: ABNORMAL
EOSINOPHIL # BLD AUTO: 0.09 K/UL (ref 0–0.51)
EOSINOPHIL NFR BLD: 0.5 % (ref 0–6.9)
ERYTHROCYTE [DISTWIDTH] IN BLOOD BY AUTOMATED COUNT: 74.1 FL (ref 35.9–50)
GLOBULIN SER CALC-MCNC: 3.3 G/DL (ref 1.9–3.5)
GLUCOSE BLD-MCNC: 143 MG/DL (ref 65–99)
GLUCOSE BLD-MCNC: 150 MG/DL (ref 65–99)
GLUCOSE BLD-MCNC: 151 MG/DL (ref 65–99)
GLUCOSE BLD-MCNC: 165 MG/DL (ref 65–99)
GLUCOSE BLD-MCNC: 184 MG/DL (ref 65–99)
GLUCOSE SERPL-MCNC: 178 MG/DL (ref 65–99)
HCO3 BLDA-SCNC: 33 MMOL/L (ref 17–25)
HCT VFR BLD AUTO: 28.2 % (ref 37–47)
HGB BLD-MCNC: 8.3 G/DL (ref 12–16)
HOROWITZ INDEX BLDA+IHG-RTO: 76 MM[HG]
IMM GRANULOCYTES # BLD AUTO: 0.55 K/UL (ref 0–0.11)
IMM GRANULOCYTES NFR BLD AUTO: 3.3 % (ref 0–0.9)
INR PPP: 1.05 (ref 0.87–1.13)
LPM ILPM: 15 LPM
LYMPHOCYTES # BLD AUTO: 0.97 K/UL (ref 1–4.8)
LYMPHOCYTES NFR BLD: 5.8 % (ref 22–41)
MAGNESIUM SERPL-MCNC: 1.7 MG/DL (ref 1.5–2.5)
MCH RBC QN AUTO: 26.7 PG (ref 27–33)
MCHC RBC AUTO-ENTMCNC: 29.4 G/DL (ref 33.6–35)
MCV RBC AUTO: 90.7 FL (ref 81.4–97.8)
MONOCYTES # BLD AUTO: 0.92 K/UL (ref 0–0.85)
MONOCYTES NFR BLD AUTO: 5.5 % (ref 0–13.4)
MORPHOLOGY BLD-IMP: NORMAL
NEUTROPHILS # BLD AUTO: 14.12 K/UL (ref 2–7.15)
NEUTROPHILS NFR BLD: 84.7 % (ref 44–72)
NRBC # BLD AUTO: 0.05 K/UL
NRBC BLD-RTO: 0.3 /100 WBC
O2/TOTAL GAS SETTING VFR VENT: 100 %
PCO2 BLDA: 39.3 MMHG (ref 26–37)
PCO2 TEMP ADJ BLDA: 41.4 MMHG (ref 26–37)
PH BLDA: 7.53 [PH] (ref 7.4–7.5)
PH TEMP ADJ BLDA: 7.51 [PH] (ref 7.4–7.5)
PHOSPHATE SERPL-MCNC: 2.7 MG/DL (ref 2.5–4.5)
PLATELET # BLD AUTO: 332 K/UL (ref 164–446)
PMV BLD AUTO: 12.1 FL (ref 9–12.9)
PO2 BLDA: 76 MMHG (ref 64–87)
PO2 TEMP ADJ BLDA: 82 MMHG (ref 64–87)
POTASSIUM SERPL-SCNC: 4.1 MMOL/L (ref 3.6–5.5)
PROT SERPL-MCNC: 5.6 G/DL (ref 6–8.2)
PROTHROMBIN TIME: 13.4 SEC (ref 12–14.6)
RBC # BLD AUTO: 3.11 M/UL (ref 4.2–5.4)
SAO2 % BLDA: 96 % (ref 93–99)
SODIUM SERPL-SCNC: 141 MMOL/L (ref 135–145)
SPECIMEN DRAWN FROM PATIENT: ABNORMAL
UFH PPP CHRO-ACNC: 0.18 IU/ML
UFH PPP CHRO-ACNC: <0.1 IU/ML
VANCOMYCIN PEAK SERPL-MCNC: 32.3 UG/ML (ref 20–40)
WBC # BLD AUTO: 16.7 K/UL (ref 4.8–10.8)

## 2021-12-13 PROCEDURE — A9270 NON-COVERED ITEM OR SERVICE: HCPCS | Performed by: INTERNAL MEDICINE

## 2021-12-13 PROCEDURE — 700105 HCHG RX REV CODE 258: Performed by: INTERNAL MEDICINE

## 2021-12-13 PROCEDURE — 85610 PROTHROMBIN TIME: CPT

## 2021-12-13 PROCEDURE — 700102 HCHG RX REV CODE 250 W/ 637 OVERRIDE(OP): Performed by: INTERNAL MEDICINE

## 2021-12-13 PROCEDURE — 71045 X-RAY EXAM CHEST 1 VIEW: CPT

## 2021-12-13 PROCEDURE — 85520 HEPARIN ASSAY: CPT

## 2021-12-13 PROCEDURE — 99291 CRITICAL CARE FIRST HOUR: CPT | Performed by: INTERNAL MEDICINE

## 2021-12-13 PROCEDURE — 83735 ASSAY OF MAGNESIUM: CPT

## 2021-12-13 PROCEDURE — 700102 HCHG RX REV CODE 250 W/ 637 OVERRIDE(OP): Performed by: NURSE PRACTITIONER

## 2021-12-13 PROCEDURE — 700111 HCHG RX REV CODE 636 W/ 250 OVERRIDE (IP): Performed by: INTERNAL MEDICINE

## 2021-12-13 PROCEDURE — 94660 CPAP INITIATION&MGMT: CPT

## 2021-12-13 PROCEDURE — 82803 BLOOD GASES ANY COMBINATION: CPT

## 2021-12-13 PROCEDURE — A9270 NON-COVERED ITEM OR SERVICE: HCPCS | Performed by: NURSE PRACTITIONER

## 2021-12-13 PROCEDURE — 99233 SBSQ HOSP IP/OBS HIGH 50: CPT | Mod: 25 | Performed by: INTERNAL MEDICINE

## 2021-12-13 PROCEDURE — 80202 ASSAY OF VANCOMYCIN: CPT

## 2021-12-13 PROCEDURE — 306467 ARJO LARGE SLING FOR UP TO 500LBS: Performed by: INTERNAL MEDICINE

## 2021-12-13 PROCEDURE — 85025 COMPLETE CBC W/AUTO DIFF WBC: CPT

## 2021-12-13 PROCEDURE — 82962 GLUCOSE BLOOD TEST: CPT

## 2021-12-13 PROCEDURE — 94669 MECHANICAL CHEST WALL OSCILL: CPT

## 2021-12-13 PROCEDURE — 36600 WITHDRAWAL OF ARTERIAL BLOOD: CPT

## 2021-12-13 PROCEDURE — 84100 ASSAY OF PHOSPHORUS: CPT

## 2021-12-13 PROCEDURE — 85730 THROMBOPLASTIN TIME PARTIAL: CPT

## 2021-12-13 PROCEDURE — 770022 HCHG ROOM/CARE - ICU (200)

## 2021-12-13 PROCEDURE — 80053 COMPREHEN METABOLIC PANEL: CPT

## 2021-12-13 RX ORDER — CHOLECALCIFEROL (VITAMIN D3) 125 MCG
5 CAPSULE ORAL
Status: DISCONTINUED | OUTPATIENT
Start: 2021-12-13 | End: 2021-12-29 | Stop reason: HOSPADM

## 2021-12-13 RX ORDER — MAGNESIUM SULFATE HEPTAHYDRATE 40 MG/ML
2 INJECTION, SOLUTION INTRAVENOUS ONCE
Status: COMPLETED | OUTPATIENT
Start: 2021-12-13 | End: 2021-12-13

## 2021-12-13 RX ORDER — FUROSEMIDE 10 MG/ML
40 INJECTION INTRAMUSCULAR; INTRAVENOUS ONCE
Status: COMPLETED | OUTPATIENT
Start: 2021-12-13 | End: 2021-12-13

## 2021-12-13 RX ORDER — HEPARIN SODIUM 5000 [USP'U]/100ML
0-30 INJECTION, SOLUTION INTRAVENOUS CONTINUOUS
Status: DISCONTINUED | OUTPATIENT
Start: 2021-12-13 | End: 2021-12-16

## 2021-12-13 RX ADMIN — VANCOMYCIN HYDROCHLORIDE 1500 MG: 500 INJECTION, POWDER, LYOPHILIZED, FOR SOLUTION INTRAVENOUS at 17:09

## 2021-12-13 RX ADMIN — HYDROCORTISONE 10 MG: 20 TABLET ORAL at 17:05

## 2021-12-13 RX ADMIN — DOXAZOSIN 2 MG: 2 TABLET ORAL at 05:09

## 2021-12-13 RX ADMIN — CEFTRIAXONE SODIUM 2 G: 2 INJECTION, POWDER, FOR SOLUTION INTRAMUSCULAR; INTRAVENOUS at 05:08

## 2021-12-13 RX ADMIN — FUROSEMIDE 40 MG: 10 INJECTION, SOLUTION INTRAMUSCULAR; INTRAVENOUS at 21:21

## 2021-12-13 RX ADMIN — GABAPENTIN 200 MG: 100 CAPSULE ORAL at 05:09

## 2021-12-13 RX ADMIN — GABAPENTIN 200 MG: 100 CAPSULE ORAL at 14:12

## 2021-12-13 RX ADMIN — OXYCODONE HYDROCHLORIDE 10 MG: 10 TABLET ORAL at 03:54

## 2021-12-13 RX ADMIN — HEPARIN SODIUM 18 UNITS/KG/HR: 5000 INJECTION, SOLUTION INTRAVENOUS at 10:02

## 2021-12-13 RX ADMIN — CARVEDILOL 25 MG: 25 TABLET, FILM COATED ORAL at 05:09

## 2021-12-13 RX ADMIN — LIOTHYRONINE SODIUM 25 MCG: 25 TABLET ORAL at 08:42

## 2021-12-13 RX ADMIN — MAGNESIUM SULFATE HEPTAHYDRATE 2 G: 2 INJECTION, SOLUTION INTRAVENOUS at 08:44

## 2021-12-13 RX ADMIN — FLUDROCORTISONE ACETATE 0.1 MG: 0.1 TABLET ORAL at 17:03

## 2021-12-13 RX ADMIN — HEPARIN SODIUM 5000 UNITS: 5000 INJECTION, SOLUTION INTRAVENOUS; SUBCUTANEOUS at 05:08

## 2021-12-13 RX ADMIN — FLUDROCORTISONE ACETATE 0.1 MG: 0.1 TABLET ORAL at 05:10

## 2021-12-13 RX ADMIN — OMEPRAZOLE 40 MG: KIT at 17:08

## 2021-12-13 RX ADMIN — FUROSEMIDE 20 MG: 10 INJECTION, SOLUTION INTRAMUSCULAR; INTRAVENOUS at 05:08

## 2021-12-13 RX ADMIN — OXYCODONE HYDROCHLORIDE 10 MG: 10 TABLET ORAL at 14:17

## 2021-12-13 RX ADMIN — HYDROCORTISONE 20 MG: 20 TABLET ORAL at 05:09

## 2021-12-13 RX ADMIN — LEVOTHYROXINE SODIUM 75 MCG: 0.07 TABLET ORAL at 05:09

## 2021-12-13 RX ADMIN — GABAPENTIN 200 MG: 100 CAPSULE ORAL at 21:21

## 2021-12-13 RX ADMIN — OMEPRAZOLE 40 MG: KIT at 05:08

## 2021-12-13 RX ADMIN — DEXAMETHASONE 0.5 MG: 1 TABLET ORAL at 17:06

## 2021-12-13 RX ADMIN — Medication 5 MG: at 01:50

## 2021-12-13 RX ADMIN — LOSARTAN POTASSIUM 100 MG: 50 TABLET, FILM COATED ORAL at 05:09

## 2021-12-13 RX ADMIN — ACETAMINOPHEN 650 MG: 325 TABLET, FILM COATED ORAL at 21:26

## 2021-12-13 RX ADMIN — DEXAMETHASONE 0.5 MG: 1 TABLET ORAL at 05:10

## 2021-12-13 RX ADMIN — AMILORIDE HYDROCLORIDE 5 MG: 5 TABLET ORAL at 05:10

## 2021-12-13 RX ADMIN — CARVEDILOL 25 MG: 25 TABLET, FILM COATED ORAL at 17:01

## 2021-12-13 RX ADMIN — FUROSEMIDE 20 MG: 10 INJECTION, SOLUTION INTRAMUSCULAR; INTRAVENOUS at 15:40

## 2021-12-13 RX ADMIN — OXYCODONE HYDROCHLORIDE 10 MG: 10 TABLET ORAL at 23:22

## 2021-12-13 ASSESSMENT — PAIN DESCRIPTION - PAIN TYPE
TYPE: ACUTE PAIN

## 2021-12-13 ASSESSMENT — LIFESTYLE VARIABLES
TOTAL SCORE: 0
HOW MANY TIMES IN THE PAST YEAR HAVE YOU HAD 5 OR MORE DRINKS IN A DAY: 0
ALCOHOL_USE: NO
CONSUMPTION TOTAL: NEGATIVE
HAVE YOU EVER FELT YOU SHOULD CUT DOWN ON YOUR DRINKING: NO
EVER FELT BAD OR GUILTY ABOUT YOUR DRINKING: NO
AVERAGE NUMBER OF DAYS PER WEEK YOU HAVE A DRINK CONTAINING ALCOHOL: 0
HAVE PEOPLE ANNOYED YOU BY CRITICIZING YOUR DRINKING: NO
TOTAL SCORE: 0
EVER HAD A DRINK FIRST THING IN THE MORNING TO STEADY YOUR NERVES TO GET RID OF A HANGOVER: NO
DOES PATIENT WANT TO STOP DRINKING: CANNOT ASSESS
TOTAL SCORE: 0
ON A TYPICAL DAY WHEN YOU DRINK ALCOHOL HOW MANY DRINKS DO YOU HAVE: 0

## 2021-12-13 ASSESSMENT — ENCOUNTER SYMPTOMS
NAUSEA: 0
WEAKNESS: 1
EYES NEGATIVE: 1
BRUISES/BLEEDS EASILY: 0
SORE THROAT: 0
PALPITATIONS: 0
BACK PAIN: 1
ABDOMINAL PAIN: 0
SHORTNESS OF BREATH: 1
NERVOUS/ANXIOUS: 0
SINUS PAIN: 0
HEADACHES: 0
BLOOD IN STOOL: 0
COUGH: 0
CHILLS: 0
FEVER: 0
SPUTUM PRODUCTION: 0
VOMITING: 0
FOCAL WEAKNESS: 0
HEMOPTYSIS: 0

## 2021-12-13 ASSESSMENT — COGNITIVE AND FUNCTIONAL STATUS - GENERAL
SUGGESTED CMS G CODE MODIFIER MOBILITY: CM
MOVING FROM LYING ON BACK TO SITTING ON SIDE OF FLAT BED: A LOT
MOVING TO AND FROM BED TO CHAIR: UNABLE
TOILETING: TOTAL
DRESSING REGULAR LOWER BODY CLOTHING: TOTAL
TURNING FROM BACK TO SIDE WHILE IN FLAT BAD: A LOT
SUGGESTED CMS G CODE MODIFIER DAILY ACTIVITY: CL
WALKING IN HOSPITAL ROOM: TOTAL
STANDING UP FROM CHAIR USING ARMS: TOTAL
CLIMB 3 TO 5 STEPS WITH RAILING: TOTAL
HELP NEEDED FOR BATHING: TOTAL
DRESSING REGULAR UPPER BODY CLOTHING: TOTAL
MOBILITY SCORE: 8
DAILY ACTIVITIY SCORE: 12

## 2021-12-13 ASSESSMENT — COPD QUESTIONNAIRES
DURING THE PAST 4 WEEKS HOW MUCH DID YOU FEEL SHORT OF BREATH: NONE/LITTLE OF THE TIME
COPD SCREENING SCORE: 2
DO YOU EVER COUGH UP ANY MUCUS OR PHLEGM?: NO/ONLY WITH OCCASIONAL COLDS OR INFECTIONS
HAVE YOU SMOKED AT LEAST 100 CIGARETTES IN YOUR ENTIRE LIFE: NO/DON'T KNOW

## 2021-12-13 ASSESSMENT — PULMONARY FUNCTION TESTS: EPAP_CMH2O: 6

## 2021-12-13 ASSESSMENT — FIBROSIS 4 INDEX: FIB4 SCORE: 1.1

## 2021-12-13 NOTE — DISCHARGE PLANNING
[facility] Rehabilitation Transitional Care Coordination    Referral from:  Dr Junior  Insurance Provider on Facesheet: Medicare  Potential Rehab Diagnosis: NTBI     Chart review indicates patient may need on going medical management and may have therapy needs to possibly meet inpatient rehab facility criteria with the goal of returning to community.    D/C support: TBD     Physiatry consultation forwarded per protocol.     Last Covid test:      S/p cardiac arrest 12/2 with prolonged resuscitation, extubated yesterday on 6L oxymask and currently on 4LNC. Would appreciate therapy evaluations when appropriate. Physiatry to consult, TCC will continue to follow.      Thank you for the referral.

## 2021-12-13 NOTE — HOSPITAL COURSE
Ms Isaac has a complicated past medical history that includes traumatic brain injury, congestive heart failure and DeWitt's disease.  She presented the emergency room on 12/1/2021 with complaints of abdominal pain.  Patient was found to have a pulmonary embolism and acute pancreatitis.  Patient started on heparin drip and admitted to the hospital she subsequently developed shock,  she was started on pressors and orders were changed to admit to the ICU.  While waiting for transfer the patient had cardiac arrest in the emergency room, CPR was per formed, she was intubated, she was resuscitated with massive transfusion protocol.  Patient has been supported with ventilator, and IVC filter has been placed , her renal function has improved and she was extubated on 12/13/2021.

## 2021-12-13 NOTE — PROGRESS NOTES
Critical Care Progress Note    Date of admission  12/1/2021    Chief Complaint  56 y.o. female admitted 12/1/2021 with acute pancreatitis, pulmonary embolism.  She has a history of prior gastrointestinal bleeding from unknown source, MRSA sinusitis on outpatient antibiotics, [place]'s disease, GERD, primary hypertension, hemochromatosis, hypothyroidism, traumatic brain injury, myocardial infarction and fibromyalgia.    Hospital Course    12/2 -    vent day 1.  Trend hemoglobin and platelet count and transfuse blood products as required.  Full vent support.  Inhaled Flolan for RV dysfunction following cardiac arrest.  12/3 -    vent day 2.  Start bicarbonate drip for MARY LOU.  Titrating norepinephrine.  Wean off Flolan.  12/4 -    vent day 3.  Renal function worse.  Titrating norepinephrine.  12/5 -    vent day 4.  Blood pressure increased.  Change dexmedetomidine to propofol.  12/6 -    vent day 5.  Arrange for IVC filter placement.  Nephrology on board.  12/7 -    vent day 6.  IVC filter placed yesterday.  Renal function improving.  Taper hydrocortisone.  12/8 -    vent day 7.  Improving renal function.  Taper hydrocortisone.  12/9 -    vent day 8.  Start NPH.  Change propofol to dexmedetomidine.  12/10 - V#9, titrating norepinephrine-off, dexmedetomidine, FiO2 increased to 0.6, start lasix  12/11 - V#10, more alert, off pressors, SAT/SBT ongoing, FiO2 weaned to 0.4  12/12 -  V#11, SAT/SBT ongoing, poor weans yesterday-no change today but R SBI normal, O2 30%, neg 3.9L, TX FOB without significant secretions, liberation trial  12/13 -  Extubated yesterday, borderline sats 6L nasal cannula, very difficult to mobilize    Interval Problem Update  Reviewed last 24 hour events:    A&O x4  SR 90s  SBp 100-140s  TF 50  UO great  Lasix  I/Os - neg 1.53 L  BUN/creatinine still improving  Sodium normalized  LFTs improving  5 lpm NC  T-max 100.9  Blood sugars running in the 140-200s-insulin dosing noted  Ceftriaxone/vancomycin per  ID for sinusitis-to complete 12/21  Hydrocortisone/dexamethasone/fludrocortisone for [place]'s  PPI      Speech see and obtain swallow eval this a.m.  PT/OT-likely needs rehab, physiatry to be consulted  Chair BID  Heparin drip, no bolus-if no bleeding in 72 hours consider starting oral therapy  Replete magnesium         YESTERDAY  Awake and follow  Off DEX  SR /ST   SBp 100-150s  UO great  30%, PEEP 8  CXR ATX BB, unchanged  Neg 21, , RSBI 42  Secretions min  [place] meds  VANCO/C3 12/21    Case reviewed with patient and her  at length at the bedside  ABG obtained, hypercarbia compensated with normal pH  RSBI remains acceptable with a couple hour trial on CPAP    Consented  and patient for therapeutic bronchoscopy, FOB to clear any mucous plugs to facilitate keeping the patient off the ventilator.  Also reviewed the possibility of BiPAP for ROSSI-like symptoms tonight or just for rest from respiratory failure and patient nodded that she was not claustrophobic and would be willing to try BiPAP as needed.  Outlined aggressive pulmonary toilet and mobilization as well.    Bronchoscopy revealed some dynamic airway collapse and mild erythema of the airways but no significant mucous plugs to account for the fairly significant atelectasis on the chest x-ray.    Liberation trial to begin, push incentive spirometry, early mobilization and aggressive pulmonary toilet    Review of Systems  Review of Systems   Constitutional: Positive for malaise/fatigue. Negative for chills and fever.   HENT: Positive for congestion. Negative for sinus pain and sore throat.    Eyes: Negative.    Respiratory: Positive for shortness of breath (Exertion). Negative for cough, hemoptysis and sputum production.    Cardiovascular: Positive for leg swelling (Improved with Lasix). Negative for chest pain and palpitations.   Gastrointestinal: Negative for abdominal pain, blood in stool, nausea and vomiting.   Genitourinary:  Negative for hematuria.   Musculoskeletal: Positive for back pain.   Neurological: Positive for weakness (Diffuse). Negative for focal weakness and headaches.   Endo/Heme/Allergies: Does not bruise/bleed easily.   Psychiatric/Behavioral: The patient is not nervous/anxious.        Vital Signs for last 24 hours   Temp:  [37.4 °C (99.3 °F)-38.3 °C (100.9 °F)] 37.4 °C (99.3 °F)  Pulse:  [] 96  Resp:  [18-36] 30  BP: (100-177)/() 106/80  SpO2:  [88 %-96 %] 91 %    Hemodynamic parameters for last 24 hours       Respiratory Information for the last 24 hours       Physical Exam   Physical Exam  Constitutional:       Appearance: She is not ill-appearing (Improved), toxic-appearing or diaphoretic.      Interventions: She is sedated and restrained. She is not intubated.  HENT:      Head: Normocephalic and atraumatic.      Nose: Nose normal.      Mouth/Throat:      Mouth: Mucous membranes are moist.      Pharynx: Oropharynx is clear.   Eyes:      General: No scleral icterus.     Conjunctiva/sclera: Conjunctivae normal.      Pupils: Pupils are equal, round, and reactive to light.   Cardiovascular:      Rate and Rhythm: Normal rate and regular rhythm.      Pulses: Normal pulses.      Heart sounds: No murmur heard.  No gallop.       Comments: SR  Pulmonary:      Effort: No accessory muscle usage. She is not intubated.      Breath sounds: Examination of the right-lower field reveals decreased breath sounds. Examination of the left-lower field reveals decreased breath sounds. Decreased breath sounds present. No wheezing or rales (Improved).   Abdominal:      General: Bowel sounds are normal. There is no distension.      Palpations: Abdomen is soft.      Tenderness: There is no abdominal tenderness. There is no right CVA tenderness, left CVA tenderness or guarding. Negative signs include Storm's sign.      Comments: Improved   Genitourinary:     Comments: Ana  Musculoskeletal:      Cervical back: Normal range of  motion.      Right lower leg: Edema (Improved again) present.      Left lower leg: Edema (Improved again) present.      Comments: No clubbing or cyanosis   Skin:     General: Skin is warm.      Capillary Refill: Capillary refill takes less than 2 seconds.      Coloration: Skin is not cyanotic or mottled.      Nails: There is no clubbing.   Neurological:      General: No focal deficit present.      Mental Status: She is oriented to person, place, and time.      GCS: GCS eye subscore is 4. GCS verbal subscore is 5. GCS motor subscore is 6.      Cranial Nerves: Cranial nerves are intact. No cranial nerve deficit.      Sensory: No sensory deficit.      Motor: Weakness present.      Comments: Follows   Psychiatric:         Mood and Affect: Mood normal.         Behavior: Behavior normal.         Thought Content: Thought content normal.      Comments: Unable to completely assess         Medications  Current Facility-Administered Medications   Medication Dose Route Frequency Provider Last Rate Last Admin   • melatonin tablet 5 mg  5 mg Oral HS PRN - MR X 1 Alison Angulo A.P.R.NJelani   5 mg at 12/13/21 0150   • heparin infusion 25,000 units in 500 mL 0.45% NACL  0-30 Units/kg/hr (Adjusted) Intravenous Continuous Alber Junior M.D. 26.2 mL/hr at 12/13/21 1002 18 Units/kg/hr at 12/13/21 1002   • losartan (COZAAR) tablet 100 mg  100 mg Enteral Tube Q DAY Alber Junior M.D.   100 mg at 12/13/21 0509   • insulin NPH (HumuLIN N,NovoLIN N) injection  10 Units Subcutaneous BID Alber Junior M.D.   10 Units at 12/13/21 0607   • acetaminophen (Tylenol) tablet 650 mg  650 mg Enteral Tube Q4HRS PRN Alber Junior M.D.   650 mg at 12/12/21 2016   • vancomycin (VANCOCIN) 1,500 mg in  mL IVPB  15 mg/kg Intravenous Q EVENING Alber Junior M.D.   Stopped at 12/12/21 2100   • furosemide (LASIX) injection 20 mg  20 mg Intravenous BID DIURETIC Charlie Stevens M.D.   20 mg at 12/13/21 0508   • hydrocortisone  (CORTEF) tablet 20 mg  20 mg Enteral Tube DAILY Eligio Del Castillo M.D.   20 mg at 12/13/21 0509    And   • hydrocortisone (CORTEF) tablet 10 mg  10 mg Enteral Tube Nightly Eligio Del Castillo M.D.   10 mg at 12/12/21 1807   • dexamethasone (DECADRON) tablet 0.5 mg  0.5 mg Enteral Tube Q12HRS Eligio Del Castillo M.D.   0.5 mg at 12/13/21 0510   • gabapentin (NEURONTIN) capsule 200 mg  200 mg Enteral Tube Q8HRS Eligio Del Castillo M.D.   200 mg at 12/13/21 0509   • oxyCODONE immediate-release (ROXICODONE) tablet 5 mg  5 mg Enteral Tube Q4HRS PRN Eligio Del Castillo M.D.   5 mg at 12/09/21 0417    Or   • oxyCODONE immediate release (ROXICODONE) tablet 10 mg  10 mg Enteral Tube Q4HRS PRN Eligio Del Castillo M.D.   10 mg at 12/13/21 0354   • HYDROmorphone (Dilaudid) injection 0.5-1 mg  0.5-1 mg Intravenous Q HOUR PRN Eligio Del Castillo M.D.   0.5 mg at 12/12/21 2243   • carvedilol (COREG) tablet 25 mg  25 mg Enteral Tube BID Eligio Del Castillo M.D.   25 mg at 12/13/21 0509   • doxazosin (CARDURA) tablet 2 mg  2 mg Enteral Tube DAILY Eligio Del Castillo M.D.   2 mg at 12/13/21 0509   • AMILoride (MIDAMOR) tablet 5 mg  5 mg Enteral Tube Q DAY Eligio Del Castillo M.D.   5 mg at 12/13/21 0510   • hydrALAZINE (APRESOLINE) injection 20 mg  20 mg Intravenous Q4HRS PRN Eligio Del Castillo M.D.   20 mg at 12/09/21 0115   • insulin regular (HumuLIN R,NovoLIN R) injection  3-14 Units Subcutaneous Q6HRS Eligio Del Castillo M.D.   3 Units at 12/13/21 0608    And   • dextrose 50% (D50W) injection 50 mL  50 mL Intravenous Q15 MIN PRN Eligio Del Castillo M.D.       • liothyronine (CYTOMEL) tablet 25 mcg  25 mcg Enteral Tube QAM AC Eligio Del Castillo M.D.   25 mcg at 12/13/21 0842   • levothyroxine (SYNTHROID) tablet 75 mcg  75 mcg Enteral Tube AM ES Eligio Del Castillo M.D.   75 mcg at 12/13/21 0509   • omeprazole (FIRST-OMEPRAZOLE) 2 mg/mL oral susp 40 mg  40 mg Enteral Tube Q12HRS  Eligio Del Castillo M.D.   40 mg at 12/13/21 0508   • fludrocortisone (FLORINEF) tablet 0.1 mg  0.1 mg Enteral Tube BID Eligio Del Castillo M.D.   0.1 mg at 12/13/21 0510   • Pharmacy Consult: Enteral tube insertion - review meds/change route/product selection  1 Each Other PHARMACY TO DOSE Eligio Del Castillo M.D.       • senna-docusate (PERICOLACE or SENOKOT S) 8.6-50 MG per tablet 2 Tablet  2 Tablet Enteral Tube BID Eligio Del Castillo M.D.   2 Tablet at 12/12/21 0601    And   • polyethylene glycol/lytes (MIRALAX) PACKET 1 Packet  1 Packet Enteral Tube QDAY PRN Eligio Del Castillo M.D.        And   • magnesium hydroxide (MILK OF MAGNESIA) suspension 30 mL  30 mL Enteral Tube QDAY PRN Eligio Del Castillo M.D.        And   • bisacodyl (DULCOLAX) suppository 10 mg  10 mg Rectal QDAY PRN Eligio Del Castillo M.D.       • ondansetron (ZOFRAN ODT) dispertab 4 mg  4 mg Enteral Tube Q4HRS PRN Eligio Del aCstillo M.D.       • labetalol (NORMODYNE/TRANDATE) injection 10-20 mg  10-20 mg Intravenous Q4HRS PRN Eligio Del Castillo M.D.   20 mg at 12/11/21 0330   • MD Alert...Vancomycin per Pharmacy   Other PHARMACY TO DOSE Eligio Del Castillo M.D.       • cefTRIAXone (Rocephin) 2 g in  mL IVPB  2 g Intravenous Q24HRS Eligio Del Castillo M.D.   Stopped at 12/13/21 0538   • Respiratory Therapy Consult   Nebulization Continuous RT Servando Rush M.D.       • MD Alert...ICU Electrolyte Replacement per Pharmacy   Other PHARMACY TO DOSE Servando Rush M.D.       • Pharmacy Consult Request ...Pain Management Review 1 Each  1 Each Other PHARMACY TO DOSE David Yoder M.D.       • ondansetron (ZOFRAN) syringe/vial injection 4 mg  4 mg Intravenous Q4HRS PRN David Yoder M.D.   4 mg at 12/11/21 231   • promethazine (PHENERGAN) suppository 12.5-25 mg  12.5-25 mg Rectal Q4HRS PRN David Yoder M.D.           Fluids    Intake/Output Summary (Last 24 hours) at 12/13/2021 1056  Last data  filed at 12/13/2021 1000  Gross per 24 hour   Intake 1215.83 ml   Output 2132 ml   Net -916.17 ml       Laboratory  Recent Labs     12/12/21  1246 12/12/21  1253   QDTQP18K 7.50  --    LJXEWH760H 36.4  --    MUSFF285S 56.6*  --    UKGS0HVC 87.2*  --    ARTHCO3 28*  --    FIO2 38  --    ARTBE 4*  --    ISTATAPH  --  7.508*   ISTATAPCO2  --  42.0*   ISTATAPO2  --  49*   ISTATATCO2  --  35*   MROAUYF6TOF  --  87*   ISTATARTHCO3  --  33.4*   ISTATARTBE  --  9*   ISTATTEMP  --  38.0 C   ISTATFIO2  --  30   ISTATSPEC  --  Arterial   ISTATAPHTC  --  7.492   XRILEOLD3ON  --  52*         Recent Labs     12/11/21  0310 12/12/21  0415 12/13/21  0330   SODIUM 147* 146* 141   POTASSIUM 4.0 3.5* 4.1   CHLORIDE 108 109 104   CO2 31 30 29   BUN 34* 26* 24*   CREATININE 0.56 0.55 0.49*   MAGNESIUM 1.6 1.5 1.7   PHOSPHORUS 1.9* 2.4* 2.7   CALCIUM 8.1* 8.1* 8.5     Recent Labs     12/11/21  0310 12/12/21  0415 12/13/21  0330   ALTSGPT 94* 65* 56*   ASTSGOT 105* 46* 36   ALKPHOSPHAT 227* 174* 167*   TBILIRUBIN 0.6 0.5 0.5   GLUCOSE 183* 179* 178*     Recent Labs     12/11/21  0310 12/11/21  0400 12/12/21  0415 12/13/21  0330   WBC  --  15.1* 15.6* 16.7*   NEUTSPOLYS  --  95.60* 83.00* 84.70*   LYMPHOCYTES  --  4.40* 6.90* 5.80*   MONOCYTES  --  0.00 5.10 5.50   EOSINOPHILS  --  0.00 0.60 0.50   BASOPHILS  --  0.00 0.20 0.20   ASTSGOT 105*  --  46* 36   ALTSGPT 94*  --  65* 56*   ALKPHOSPHAT 227*  --  174* 167*   TBILIRUBIN 0.6  --  0.5 0.5     Recent Labs     12/11/21  0400 12/12/21  0415 12/13/21  0330 12/13/21  0950   RBC 3.32* 3.11* 3.11*  --    HEMOGLOBIN 8.7* 8.4* 8.3*  --    HEMATOCRIT 29.7* 27.6* 28.2*  --    PLATELETCT 258 296 332  --    PROTHROMBTM  --   --   --  13.4   APTT  --   --   --  31.7   INR  --   --   --  1.05       Imaging  X-Ray:  I have personally reviewed the images and compared with prior images. and My impression is: Unchanged left lower lobe opacities    Assessment/Plan  * Cardiac arrest (HCC)  Assessment &  Plan  S/p PEA, asystole and ventricular tachycardia in ED - precipitated by hemorrhagic shock  ROSC after 7 rounds of CPR, IV epinephrine, IV bicarbonate solution and massive transfusion protocol  ECHO with RV dysfunction and RVSP of 50 mmHg  Hemodynamics improved, weaned off pressors  Neurologically improved, extubated 12/12, diffusely weak    Atelectasis  Assessment & Plan  Multifactorial-status post arrest, prolonged course of ventilator, weak and not moving much, obese with episode of pancreatitis  Monitor for the need for therapeutic bronchoscopy, active TX FOB 12/8, follow-up TX FOB with extubation 12/12 with minimal plugs  Lysed to chair twice daily  Up for meals, patient and RN encouraged strongly  I-S, may need IPPV  RT protocols  Serial chest x-ray as needed    Hypokalemia  Assessment & Plan  Replete potassium, ERP    Paroxysmal atrial fibrillation (HCC)  Assessment & Plan  Rate controlled, currently in SR  Continue carvedilol, 25 mg twice daily  Optimize potassium and magnesium  [id number] score high, 10% risk CVA, will need anticoagulation when/if becomes clinically appropriate  Initiating heparin drip without bolus 12/13    Hypocalcemia  Assessment & Plan  Replete calcium, ERP    Hypomagnesemia  Assessment & Plan  Replete magnesium, ERP    Hemoperitoneum  Assessment & Plan  Surgery has evaluated  No indication for surgery at this time  Trend hemoglobin and transfuse PRBCs to keep hemoglobin greater than 7-no change in greater than 1 week  Unchanged hemoglobin times approximately 1 week  Status post IVC filter 12/6  Heparin PPx reinitiated 12/10, no bleeding, will start heparin drip no bolus for PE/PAF 12/13    Gastrointestinal hemorrhage  Assessment & Plan  History of gastrointestinal hemorrhage with no source identified-no recurrence/monitoring  Possible gastrointestinal hemorrhage provoked by IV heparin for pulmonary embolism - no gross blood noted in stool - positive for occult blood  Continue PPI  twice daily  GI consult noted, if rebleeds f/u GI consultation  Trend hemoglobin and transfuse PRBCs to keep hemoglobin greater than 7  Heparin subcu PPx restarted 12/10, trial IV heparin no bolus starting 12/13, consider enteral agent if no bleeding after several days  Status post IVC filter off anticoagulation for A. fib    DVT (deep venous thrombosis) ([facility])  Assessment & Plan  Imaging reveals LUE DVT associated with her PICC line and a distal LLE DVT  Anticoagulation strictly contraindicated due to acute gastrointestinal hemorrhage and hemoperitoneum  IVC filter placement on 12/6  Also needs anticoagulation from a PE & PAF [id number] 2 risk score - 10%  Resuming IV heparin without bolus 12/13    Shock (HCC)- (present on admission)  Assessment & Plan  Hemorrhagic shock with acute gastrointestinal blood loss and vasodistributive shock  Shock has resolved, weaned off norepinephrine  Hemoglobin grossly unchanged    Acute pancreatitis  Assessment & Plan  Resolved likely  She is having bowel movements  She is tolerating enteral tube feedings  She denies N/V/abdominal pain  Continue to monitor    PE (pulmonary thromboembolism) ([facility])- (present on admission)  Assessment & Plan  Anticoagulation was contraindicated initially  IVC filter placement on 12/6  No change in hemoglobin or significant GI bleeding since heparin stopped, greater than 1 week  No issues with subcu heparin PPx and platelet count has normalized  DC subcu heparin PPx and start heparin without bolus by drip 12/13  Consider transitioning to enteral agent if tolerates IV heparin over the next 72+ hours    Debilitated patient  Assessment & Plan  Very complex medical patient now post arrest and 10-11-day ventilator course with severe diffuse weakness needing Neeru lift to transfer to chair  PT/OT to eval and treat  Speech therapy consult, swallow evaluation  Appears to be doing cognitively okay, monitor for need for cognitive eval  Rehab versus  LTAC  Physiatry consultation quested  Not a candidate for SNU given her complex medical scenario and ongoing treatments as she significantly improves    Acute blood loss anemia- (present on admission)  Assessment & Plan  Suspect gastrointestinal source of blood loss as well as acute hemoperitoneum-no recurrence clinically-monitoring  10 you to trend hemoglobin and transfuse PRBCs to keep hemoglobin greater than 7  Likely no recurrence, hemoglobin unchanged for approximately >7 days  That is post 3 days heparin SQ PPx-> initiate IV heparin without bolus  Start trial anticoagulation no bleeding on heparin for DVT/PE/PAF    Thrombocytopenia (HCC)- (present on admission)  Assessment & Plan  Trend platelet count and thromboelastogram with platelet mapping as needed  Transfuse platelets as required  Count normalized 12/9  Continue to monitor slowly with heparin drip starting again 12/13    Hemochromatosis- (present on admission)  Assessment & Plan  History of    Adrenal insufficiency (Valent[place]'s disease) (HCC)- (present on admission)  Assessment & Plan  Continue hydrocortisone to 20 mg twice daily  Continue Florinef, 0.1 mg twice daily  Monitor for the need to bump hydrocortisone up with stress  Monitoring hemodynamics/electrolytes closely    Elevated LFTs- (present on admission)  Assessment & Plan  Due to ischemic hepatopathy from cardiac arrest and shock  Trend enzymes and synthetic function  Avoid hepatotoxins  Mildly trending up-continue to monitor    MRSA and E. coli sinusitis  Assessment & Plan  On outpatient IV antibiotics - LUE PICC in place  Continue vancomycin and ceftriaxone-6-week course of antibiotics planned per ID-to complete 12/21 per pharmacy  monitor for secondary infections and antibiotic complications    Acute kidney injury (HCC)- (present on admission)  Assessment & Plan  Continues to slowly improve, creatinine normalized and BUN still coming down  Suspect ATN due to shock in lady with underlying  atrophic kidneys  Monitor renal function and urine output  Avoid nephrotoxins and renal dose medications  Serial BMP    Primary hypertension- (present on admission)  Assessment & Plan  Goal SBP less than 160  Hydralazine and labetalol as necessary to achieve blood pressure goals-if frequently required adjust enteral regimen  Continue amiloride, 5 mg daily  Continue doxazosin, 2 mg daily  Continue carvedilol, 25 mg twice daily  Reassess daily    Hypothyroidism- (present on admission)  Assessment & Plan  Continue levothyroxine and liothyronine  Repeat TSH in 4 weeks, sooner if significant arrhythmias occur without obvious etiology  Most recent TSH borderline low    Obesity (BMI 35.0-39.9 without comorbidity)- (present on admission)  Assessment & Plan  Behavioral modification and weight loss encouraged  Hypothyroid on repletion therapy  TSH borderline low    Acute respiratory failure with hypoxia (HCC)- (present on admission)  Assessment & Plan  Intubated 12/2-extubated 12/12  RT protocols  Mobility level 2-3  Aggressive pulmonary toilet, repeat TX FOB as needed  Forced diuresis ongoing - dose with diamox?  Serial chest x-ray as needed  BiPAP for support post extubation as needed  Keep in ICU 1 more day?       VTE:  Contraindicated  Ulcer: PPI  Lines: Central Line  Ongoing indication addressed and Perez Catheter  Ongoing indication addressed     I have performed a physical exam and reviewed and updated ROS and Plan today (12/13/2021). In review of yesterday's note (12/12/2021), there are no changes except as documented above.     Discussed patient condition and risk of morbidity and/or mortality with RN, RT, Pharmacy, , Charge nurse / hot rounds and Patient

## 2021-12-13 NOTE — PROGRESS NOTES
Dr. Junior updated on patient: that she has sat in the chair for several hours, and that her lungs sound more diminished than previous assessment. No new orders received.

## 2021-12-13 NOTE — DISCHARGE PLANNING
Care Transition Team Discharge Planning      Anticipated Discharge Disposition: TBD     Action: LSw attended AM rounds, Md will acquire therapy assessments and placed an order for Rehab.    Later, Lsw requested admissions at Rehab please assist Dr Junior w/ speaking w/ Phsyiatrist on call today to discuss pt.        Barriers to Discharge: not medically clear/stable     Plan: Lsw will continue to follow, and assist w/ d/c planning.

## 2021-12-13 NOTE — ASSESSMENT & PLAN NOTE
Very complex medical patient now post arrest and 10-11-day ventilator course with severe diffuse weakness needing Neeru lift to transfer to chair  PT/OT to eval and treat  Speech therapy consult, swallow evaluation  Appears to be doing cognitively okay, monitor for need for cognitive eval  Rehab versus LTAC - both consulted  Physiatry consultation noted  Not a candidate for SNU given her complex medical scenario and ongoing treatments as she significantly improves  Transfer to [facility], patient may be ready early next week for rehab/LTAC

## 2021-12-13 NOTE — CARE PLAN
The patient is Watcher - Medium risk of patient condition declining or worsening    Shift Goals  Clinical Goals: extubation, pain management, mobility to edge of the bed, adequate oxygenation, BP control   Patient Goals: improvement  Family Goals: improvement     Problem: Pain - Standard  Goal: Alleviation of pain or a reduction in pain to the patient’s comfort goal  Outcome: Progressing  PRNs to manage pain.     Problem: Knowledge Deficit - Standard  Goal: Patient and family/care givers will demonstrate understanding of plan of care, disease process/condition, diagnostic tests and medications  Outcome: Progressing  Education to family and patient regarding ICU progression.     Problem: Skin Integrity  Goal: Skin integrity is maintained or improved  Outcome: Progressing  Q2h turning, mobilization to recliner chair today. BMS in place. Waffle cushion in place. Waffle chair cushion in place. Mepitel over skin tears. Barrier paste.      Problem: Fall Risk  Goal: Patient will remain free from falls  Outcome: Progressing   Safe mobilization. Bed alarm on.

## 2021-12-13 NOTE — CONSULTS
PMR aware of referral. Therapy notes are incomplete at this time. TCCs will follow in the periphery for rehab appropriateness, and a full consult will be done if the patient qualifies for IPR. If the patient is determined not to be a candidate for IPR, TCC's will sign off the case and will no longer follow. Look for updates in the Discharge tab. Please reach out with questions or requests for medical management.     Dx: Cardiac arrest status post PEA precipitated by hemorrhagic shock (suspected GI etiology), required 11 days on vent, extubated yesterday.  Hospital course complicated by anemia.    Of note, patient previously attended IPR, requiring transfer back to [facility] for GI bleeding, ultimately required SNF placement due to lack of endurance to tolerate 3 hours of therapy 5 days a week.  Given patient's complicated hospital stay so far, I suspect she will again need SNF services prior to transitioning home.  PMR will await further evaluation by PT and OT to make a final determination.  Thank you for the consult.    Yefri Beasley,    Physical Medicine and Rehabilitation

## 2021-12-13 NOTE — PROGRESS NOTES
"Dr. Junior notified that patient drank a cup full of water this AM. Per NOC RN report, patient sounded \"wet\" afterwards. No new orders from Dr. Junior.   "

## 2021-12-14 ENCOUNTER — HOSPITAL ENCOUNTER (INPATIENT)
Facility: REHABILITATION | Age: 56
End: 2021-12-14
Attending: PHYSICAL MEDICINE & REHABILITATION | Admitting: PHYSICAL MEDICINE & REHABILITATION
Payer: MEDICARE

## 2021-12-14 ENCOUNTER — APPOINTMENT (OUTPATIENT)
Dept: RADIOLOGY | Facility: MEDICAL CENTER | Age: 56
DRG: 981 | End: 2021-12-14
Attending: INTERNAL MEDICINE
Payer: MEDICARE

## 2021-12-14 LAB
ALBUMIN SERPL BCP-MCNC: 2.6 G/DL (ref 3.2–4.9)
ALBUMIN/GLOB SERPL: 0.8 G/DL
ALP SERPL-CCNC: 176 U/L (ref 30–99)
ALT SERPL-CCNC: 50 U/L (ref 2–50)
ANION GAP SERPL CALC-SCNC: 10 MMOL/L (ref 7–16)
AST SERPL-CCNC: 33 U/L (ref 12–45)
BASOPHILS # BLD AUTO: 0.1 % (ref 0–1.8)
BASOPHILS # BLD: 0.02 K/UL (ref 0–0.12)
BILIRUB SERPL-MCNC: 0.5 MG/DL (ref 0.1–1.5)
BUN SERPL-MCNC: 25 MG/DL (ref 8–22)
CALCIUM SERPL-MCNC: 8.6 MG/DL (ref 8.5–10.5)
CHLORIDE SERPL-SCNC: 103 MMOL/L (ref 96–112)
CO2 SERPL-SCNC: 29 MMOL/L (ref 20–33)
CREAT SERPL-MCNC: 0.53 MG/DL (ref 0.5–1.4)
EOSINOPHIL # BLD AUTO: 0.11 K/UL (ref 0–0.51)
EOSINOPHIL NFR BLD: 0.7 % (ref 0–6.9)
ERYTHROCYTE [DISTWIDTH] IN BLOOD BY AUTOMATED COUNT: 71.9 FL (ref 35.9–50)
GLOBULIN SER CALC-MCNC: 3.2 G/DL (ref 1.9–3.5)
GLUCOSE BLD-MCNC: 130 MG/DL (ref 65–99)
GLUCOSE BLD-MCNC: 138 MG/DL (ref 65–99)
GLUCOSE BLD-MCNC: 169 MG/DL (ref 65–99)
GLUCOSE BLD-MCNC: 263 MG/DL (ref 65–99)
GLUCOSE SERPL-MCNC: 170 MG/DL (ref 65–99)
HCT VFR BLD AUTO: 28.6 % (ref 37–47)
HGB BLD-MCNC: 8.5 G/DL (ref 12–16)
IMM GRANULOCYTES # BLD AUTO: 0.7 K/UL (ref 0–0.11)
IMM GRANULOCYTES NFR BLD AUTO: 4.4 % (ref 0–0.9)
LYMPHOCYTES # BLD AUTO: 1.34 K/UL (ref 1–4.8)
LYMPHOCYTES NFR BLD: 8.4 % (ref 22–41)
MAGNESIUM SERPL-MCNC: 1.6 MG/DL (ref 1.5–2.5)
MCH RBC QN AUTO: 26.5 PG (ref 27–33)
MCHC RBC AUTO-ENTMCNC: 29.7 G/DL (ref 33.6–35)
MCV RBC AUTO: 89.1 FL (ref 81.4–97.8)
MONOCYTES # BLD AUTO: 0.99 K/UL (ref 0–0.85)
MONOCYTES NFR BLD AUTO: 6.2 % (ref 0–13.4)
NEUTROPHILS # BLD AUTO: 12.7 K/UL (ref 2–7.15)
NEUTROPHILS NFR BLD: 80.2 % (ref 44–72)
NRBC # BLD AUTO: 0.11 K/UL
NRBC BLD-RTO: 0.7 /100 WBC
PHOSPHATE SERPL-MCNC: 2.4 MG/DL (ref 2.5–4.5)
PLATELET # BLD AUTO: 379 K/UL (ref 164–446)
PMV BLD AUTO: 12.2 FL (ref 9–12.9)
POTASSIUM SERPL-SCNC: 3.8 MMOL/L (ref 3.6–5.5)
PROT SERPL-MCNC: 5.8 G/DL (ref 6–8.2)
RBC # BLD AUTO: 3.21 M/UL (ref 4.2–5.4)
SODIUM SERPL-SCNC: 142 MMOL/L (ref 135–145)
UFH PPP CHRO-ACNC: 0.33 IU/ML
UFH PPP CHRO-ACNC: 0.42 IU/ML
VANCOMYCIN TROUGH SERPL-MCNC: 14.3 UG/ML (ref 10–20)
WBC # BLD AUTO: 15.9 K/UL (ref 4.8–10.8)

## 2021-12-14 PROCEDURE — 83735 ASSAY OF MAGNESIUM: CPT

## 2021-12-14 PROCEDURE — A9270 NON-COVERED ITEM OR SERVICE: HCPCS | Performed by: INTERNAL MEDICINE

## 2021-12-14 PROCEDURE — 97162 PT EVAL MOD COMPLEX 30 MIN: CPT

## 2021-12-14 PROCEDURE — 85520 HEPARIN ASSAY: CPT

## 2021-12-14 PROCEDURE — 99291 CRITICAL CARE FIRST HOUR: CPT | Performed by: INTERNAL MEDICINE

## 2021-12-14 PROCEDURE — 770022 HCHG ROOM/CARE - ICU (200)

## 2021-12-14 PROCEDURE — 92610 EVALUATE SWALLOWING FUNCTION: CPT

## 2021-12-14 PROCEDURE — 700105 HCHG RX REV CODE 258: Performed by: INTERNAL MEDICINE

## 2021-12-14 PROCEDURE — 97166 OT EVAL MOD COMPLEX 45 MIN: CPT

## 2021-12-14 PROCEDURE — 80202 ASSAY OF VANCOMYCIN: CPT

## 2021-12-14 PROCEDURE — 94660 CPAP INITIATION&MGMT: CPT

## 2021-12-14 PROCEDURE — 80053 COMPREHEN METABOLIC PANEL: CPT

## 2021-12-14 PROCEDURE — 700102 HCHG RX REV CODE 250 W/ 637 OVERRIDE(OP): Performed by: INTERNAL MEDICINE

## 2021-12-14 PROCEDURE — 84100 ASSAY OF PHOSPHORUS: CPT

## 2021-12-14 PROCEDURE — 94669 MECHANICAL CHEST WALL OSCILL: CPT

## 2021-12-14 PROCEDURE — 700111 HCHG RX REV CODE 636 W/ 250 OVERRIDE (IP): Performed by: INTERNAL MEDICINE

## 2021-12-14 PROCEDURE — 85025 COMPLETE CBC W/AUTO DIFF WBC: CPT

## 2021-12-14 PROCEDURE — 82962 GLUCOSE BLOOD TEST: CPT | Mod: 91

## 2021-12-14 PROCEDURE — 94640 AIRWAY INHALATION TREATMENT: CPT

## 2021-12-14 PROCEDURE — 700101 HCHG RX REV CODE 250: Performed by: INTERNAL MEDICINE

## 2021-12-14 PROCEDURE — 71045 X-RAY EXAM CHEST 1 VIEW: CPT

## 2021-12-14 RX ORDER — LIOTHYRONINE SODIUM 25 UG/1
25 TABLET ORAL
Status: DISCONTINUED | OUTPATIENT
Start: 2021-12-15 | End: 2021-12-29 | Stop reason: HOSPADM

## 2021-12-14 RX ORDER — OXYCODONE HYDROCHLORIDE 5 MG/1
5 TABLET ORAL EVERY 4 HOURS PRN
Status: DISCONTINUED | OUTPATIENT
Start: 2021-12-14 | End: 2021-12-29 | Stop reason: HOSPADM

## 2021-12-14 RX ORDER — OMEPRAZOLE 20 MG/1
40 CAPSULE, DELAYED RELEASE ORAL DAILY
Status: DISCONTINUED | OUTPATIENT
Start: 2021-12-15 | End: 2021-12-23

## 2021-12-14 RX ORDER — DEXTROSE MONOHYDRATE 25 G/50ML
50 INJECTION, SOLUTION INTRAVENOUS
Status: DISCONTINUED | OUTPATIENT
Start: 2021-12-14 | End: 2021-12-29 | Stop reason: HOSPADM

## 2021-12-14 RX ORDER — HYDROCORTISONE 20 MG/1
10 TABLET ORAL NIGHTLY
Status: DISCONTINUED | OUTPATIENT
Start: 2021-12-14 | End: 2021-12-23

## 2021-12-14 RX ORDER — FLUDROCORTISONE ACETATE 0.1 MG/1
0.1 TABLET ORAL 2 TIMES DAILY
Status: DISCONTINUED | OUTPATIENT
Start: 2021-12-14 | End: 2021-12-22

## 2021-12-14 RX ORDER — LOSARTAN POTASSIUM 50 MG/1
100 TABLET ORAL
Status: DISCONTINUED | OUTPATIENT
Start: 2021-12-15 | End: 2021-12-16

## 2021-12-14 RX ORDER — GABAPENTIN 100 MG/1
200 CAPSULE ORAL EVERY 8 HOURS
Status: DISCONTINUED | OUTPATIENT
Start: 2021-12-14 | End: 2021-12-29 | Stop reason: HOSPADM

## 2021-12-14 RX ORDER — ACETAMINOPHEN 325 MG/1
650 TABLET ORAL EVERY 4 HOURS PRN
Status: DISCONTINUED | OUTPATIENT
Start: 2021-12-14 | End: 2021-12-29 | Stop reason: HOSPADM

## 2021-12-14 RX ORDER — BISACODYL 10 MG
10 SUPPOSITORY, RECTAL RECTAL
Status: DISCONTINUED | OUTPATIENT
Start: 2021-12-14 | End: 2021-12-19

## 2021-12-14 RX ORDER — LIDOCAINE 50 MG/G
1 PATCH TOPICAL EVERY 24 HOURS
Status: DISCONTINUED | OUTPATIENT
Start: 2021-12-14 | End: 2021-12-29 | Stop reason: HOSPADM

## 2021-12-14 RX ORDER — DEXAMETHASONE 1 MG
0.5 TABLET ORAL EVERY 12 HOURS
Status: DISCONTINUED | OUTPATIENT
Start: 2021-12-14 | End: 2021-12-23

## 2021-12-14 RX ORDER — OXYCODONE HYDROCHLORIDE 10 MG/1
10 TABLET ORAL EVERY 4 HOURS PRN
Status: DISCONTINUED | OUTPATIENT
Start: 2021-12-14 | End: 2021-12-29 | Stop reason: HOSPADM

## 2021-12-14 RX ORDER — AMILORIDE HYDROCHLORIDE 5 MG/1
5 TABLET ORAL
Status: DISCONTINUED | OUTPATIENT
Start: 2021-12-15 | End: 2021-12-20

## 2021-12-14 RX ORDER — DOXAZOSIN 2 MG/1
2 TABLET ORAL DAILY
Status: DISCONTINUED | OUTPATIENT
Start: 2021-12-15 | End: 2021-12-23

## 2021-12-14 RX ORDER — MAGNESIUM SULFATE HEPTAHYDRATE 40 MG/ML
2 INJECTION, SOLUTION INTRAVENOUS ONCE
Status: COMPLETED | OUTPATIENT
Start: 2021-12-14 | End: 2021-12-14

## 2021-12-14 RX ORDER — CARVEDILOL 25 MG/1
25 TABLET ORAL 2 TIMES DAILY
Status: DISCONTINUED | OUTPATIENT
Start: 2021-12-14 | End: 2021-12-17

## 2021-12-14 RX ORDER — AMOXICILLIN 250 MG
2 CAPSULE ORAL 2 TIMES DAILY
Status: DISCONTINUED | OUTPATIENT
Start: 2021-12-14 | End: 2021-12-19

## 2021-12-14 RX ORDER — LEVOTHYROXINE SODIUM 0.07 MG/1
75 TABLET ORAL
Status: DISCONTINUED | OUTPATIENT
Start: 2021-12-15 | End: 2021-12-29 | Stop reason: HOSPADM

## 2021-12-14 RX ORDER — HYDROCORTISONE 20 MG/1
20 TABLET ORAL DAILY
Status: DISCONTINUED | OUTPATIENT
Start: 2021-12-15 | End: 2021-12-23

## 2021-12-14 RX ORDER — POLYETHYLENE GLYCOL 3350 17 G/17G
1 POWDER, FOR SOLUTION ORAL
Status: DISCONTINUED | OUTPATIENT
Start: 2021-12-14 | End: 2021-12-19

## 2021-12-14 RX ORDER — ONDANSETRON 4 MG/1
4 TABLET, ORALLY DISINTEGRATING ORAL EVERY 4 HOURS PRN
Status: DISCONTINUED | OUTPATIENT
Start: 2021-12-14 | End: 2021-12-29 | Stop reason: HOSPADM

## 2021-12-14 RX ADMIN — GABAPENTIN 200 MG: 100 CAPSULE ORAL at 21:28

## 2021-12-14 RX ADMIN — CEFTRIAXONE SODIUM 2 G: 2 INJECTION, POWDER, FOR SOLUTION INTRAMUSCULAR; INTRAVENOUS at 05:29

## 2021-12-14 RX ADMIN — LEVOTHYROXINE SODIUM 75 MCG: 0.07 TABLET ORAL at 05:28

## 2021-12-14 RX ADMIN — LIOTHYRONINE SODIUM 25 MCG: 25 TABLET ORAL at 09:26

## 2021-12-14 RX ADMIN — HEPARIN SODIUM 20 UNITS/KG/HR: 5000 INJECTION, SOLUTION INTRAVENOUS at 21:30

## 2021-12-14 RX ADMIN — AMILORIDE HYDROCLORIDE 5 MG: 5 TABLET ORAL at 05:33

## 2021-12-14 RX ADMIN — LOSARTAN POTASSIUM 100 MG: 50 TABLET, FILM COATED ORAL at 05:28

## 2021-12-14 RX ADMIN — FUROSEMIDE 20 MG: 10 INJECTION, SOLUTION INTRAMUSCULAR; INTRAVENOUS at 05:28

## 2021-12-14 RX ADMIN — OXYCODONE 5 MG: 5 TABLET ORAL at 16:14

## 2021-12-14 RX ADMIN — HYDROCORTISONE 20 MG: 20 TABLET ORAL at 05:30

## 2021-12-14 RX ADMIN — INSULIN HUMAN 7 UNITS: 100 INJECTION, SOLUTION PARENTERAL at 13:21

## 2021-12-14 RX ADMIN — CARVEDILOL 25 MG: 25 TABLET, FILM COATED ORAL at 05:32

## 2021-12-14 RX ADMIN — DOXAZOSIN 2 MG: 2 TABLET ORAL at 05:30

## 2021-12-14 RX ADMIN — VANCOMYCIN HYDROCHLORIDE 1500 MG: 500 INJECTION, POWDER, LYOPHILIZED, FOR SOLUTION INTRAVENOUS at 17:56

## 2021-12-14 RX ADMIN — GABAPENTIN 200 MG: 100 CAPSULE ORAL at 13:21

## 2021-12-14 RX ADMIN — HYDROCORTISONE 10 MG: 20 TABLET ORAL at 17:56

## 2021-12-14 RX ADMIN — FLUDROCORTISONE ACETATE 0.1 MG: 0.1 TABLET ORAL at 17:56

## 2021-12-14 RX ADMIN — GABAPENTIN 200 MG: 100 CAPSULE ORAL at 05:28

## 2021-12-14 RX ADMIN — HYDROMORPHONE HYDROCHLORIDE 0.5 MG: 1 INJECTION, SOLUTION INTRAMUSCULAR; INTRAVENOUS; SUBCUTANEOUS at 11:04

## 2021-12-14 RX ADMIN — MAGNESIUM SULFATE HEPTAHYDRATE 2 G: 40 INJECTION, SOLUTION INTRAVENOUS at 09:26

## 2021-12-14 RX ADMIN — OXYCODONE HYDROCHLORIDE 10 MG: 10 TABLET ORAL at 04:06

## 2021-12-14 RX ADMIN — POTASSIUM PHOSPHATE, MONOBASIC AND POTASSIUM PHOSPHATE, DIBASIC 15 MMOL: 224; 236 INJECTION, SOLUTION, CONCENTRATE INTRAVENOUS at 09:26

## 2021-12-14 RX ADMIN — DEXAMETHASONE 0.5 MG: 1 TABLET ORAL at 05:33

## 2021-12-14 RX ADMIN — FLUDROCORTISONE ACETATE 0.1 MG: 0.1 TABLET ORAL at 05:30

## 2021-12-14 RX ADMIN — DEXAMETHASONE 0.5 MG: 1 TABLET ORAL at 17:55

## 2021-12-14 RX ADMIN — FUROSEMIDE 20 MG: 10 INJECTION, SOLUTION INTRAMUSCULAR; INTRAVENOUS at 16:01

## 2021-12-14 RX ADMIN — HEPARIN SODIUM 20 UNITS/KG/HR: 5000 INJECTION, SOLUTION INTRAVENOUS at 04:33

## 2021-12-14 RX ADMIN — OXYCODONE 5 MG: 5 TABLET ORAL at 21:28

## 2021-12-14 RX ADMIN — OMEPRAZOLE 40 MG: KIT at 05:29

## 2021-12-14 RX ADMIN — LIDOCAINE 1 PATCH: 50 PATCH TOPICAL at 09:32

## 2021-12-14 ASSESSMENT — PAIN DESCRIPTION - PAIN TYPE
TYPE: ACUTE PAIN
TYPE: CHRONIC PAIN
TYPE: ACUTE PAIN

## 2021-12-14 ASSESSMENT — COGNITIVE AND FUNCTIONAL STATUS - GENERAL
SUGGESTED CMS G CODE MODIFIER MOBILITY: CM
DAILY ACTIVITIY SCORE: 10
CLIMB 3 TO 5 STEPS WITH RAILING: TOTAL
MOVING TO AND FROM BED TO CHAIR: UNABLE
STANDING UP FROM CHAIR USING ARMS: A LOT
DRESSING REGULAR UPPER BODY CLOTHING: A LOT
SUGGESTED CMS G CODE MODIFIER DAILY ACTIVITY: CL
PERSONAL GROOMING: A LOT
MOVING FROM LYING ON BACK TO SITTING ON SIDE OF FLAT BED: UNABLE
HELP NEEDED FOR BATHING: A LOT
DRESSING REGULAR LOWER BODY CLOTHING: TOTAL
WALKING IN HOSPITAL ROOM: TOTAL
MOBILITY SCORE: 7
EATING MEALS: A LOT
TOILETING: TOTAL
TURNING FROM BACK TO SIDE WHILE IN FLAT BAD: UNABLE

## 2021-12-14 ASSESSMENT — ENCOUNTER SYMPTOMS
SPUTUM PRODUCTION: 0
HEADACHES: 0
BACK PAIN: 1
WEAKNESS: 1
FOCAL WEAKNESS: 0
PALPITATIONS: 0
FEVER: 0
SHORTNESS OF BREATH: 1
ABDOMINAL PAIN: 0
NAUSEA: 0
VOMITING: 0
BLOOD IN STOOL: 0
SORE THROAT: 0
BRUISES/BLEEDS EASILY: 0
EYES NEGATIVE: 1
SINUS PAIN: 0
COUGH: 0
NERVOUS/ANXIOUS: 0
CHILLS: 0
HEMOPTYSIS: 0

## 2021-12-14 ASSESSMENT — PULMONARY FUNCTION TESTS
EPAP_CMH2O: 6
EPAP_CMH2O: 9
EPAP_CMH2O: 6

## 2021-12-14 ASSESSMENT — ACTIVITIES OF DAILY LIVING (ADL): TOILETING: INDEPENDENT

## 2021-12-14 ASSESSMENT — GAIT ASSESSMENTS: GAIT LEVEL OF ASSIST: UNABLE TO PARTICIPATE

## 2021-12-14 NOTE — CARE PLAN
Problem: Pain - Standard  Goal: Alleviation of pain or a reduction in pain to the patient’s comfort goal  Outcome: Not Met   Assessed for pain [id number] minimum, applied non-pharmacological and pharmacological measures to decrease pain  Problem: Knowledge Deficit - Standard  Goal: Patient and family/care givers will demonstrate understanding of plan of care, disease process/condition, diagnostic tests and medications  Outcome: Progressing     Problem: Fall Risk  Goal: Patient will remain free from falls  Outcome: Progressing   The patient is Watcher - Medium risk of patient condition declining or worsening    Shift Goals  Clinical Goals: Improved O2 sats  Patient Goals: sleep  Family Goals: n/a        Patient is not progressing towards the following goals: Patient required increased O2. Pain medicine given per PRN and patient request.       Problem: Pain - Standard  Goal: Alleviation of pain or a reduction in pain to the patient’s comfort goal  Outcome: Not Met     Problem: Skin Integrity  Goal: Skin integrity is maintained or improved  Outcome: Not Met

## 2021-12-14 NOTE — CARE PLAN
The patient is Watcher - Medium risk of patient condition declining or worsening    Shift Goals  Clinical Goals: mobilize, IS, decrease oxygen requirements  Patient Goals: eat/drink, swallow evaluation, walk  Family Goals: N/A    Progress made toward(s) clinical / shift goals:  Patient sat in the chair for several hours, patient has been educated on how to use the IS and RN/RT has been working with patient throughout the day. Patient has been titrated from 5L --> 3L NC.     Patient is not progressing towards the following goals:    Patient has not had a speech evaluation, so she has not been able to eat or drink. She also is not strong enough to walk at this time.       Problem: Fall Risk  Goal: Patient will remain free from falls  Note: 4 people used to get patient up to the chair. This proved to be unsafe, so a jesús lift was used to get patient back to bed. PT/OT has been ordered to help patient regain strength.          Problem: Pain - Standard  Goal: Alleviation of pain or a reduction in pain to the patient’s comfort goal  Outcome: Not Met  Note: Heat and medication used to treat patient's pain.

## 2021-12-14 NOTE — PROGRESS NOTES
Pulmonary/Critical Care Medicine   Progress Note    Date of service: 12/13/2021  Time: 2045    I was called to evaluate the patient Dhara Isaac a 56-year-old female with acute pancreatitis, pulmonary embolism, obesity and respiratory failure who was extubated 12/12.  She has required increasing oxygen up to 15 L/min facemask this evening with increasing crackles on lung exam and work of breathing.  I ordered an arterial blood gas showing a respiratory alkalosis with a moderate A-a gradient and a chest x-ray showing bibasilar atelectasis, low lung volumes and mild edema.  I will place the patient on noninvasive ventilatory support tonight and reassess her respiratory status hopefully preventing need for reintubation.  I have also increased her Lasix dose for now which will need to be reassessed in the morning.    I spent extensive time in reviewing the patient's condition, physical examination, laboratory and imaging data, prior documentation, in discussion with RN, RT, patient, and in formulating an assessment/plan.    Critical Care time: 31 min. No time overlap, procedures not included in time.  [id number]

## 2021-12-14 NOTE — DISCHARGE PLANNING
Care Transition Team Assessment  Pt lives w/ spouse who can assist her post d/c.  Pt has a FWW from last rehab d/c and a w/c from [facility] at home. Pt reports she would rather have a black FWW w/ hand brakes. She does not really use the FWW. Lsw explained the form of FWW she describes is available at [facility], [facility], and possibly at [facility].    Lsw provided a [facility] pamphlet to pt.    Lsw noted pt appears to be possibly considered for Rehab.      Information Source  Orientation Level: Oriented X4  Information Given By: Patient  Informant's Name: Donna  Who is responsible for making decisions for patient? : Patient    Readmission Evaluation  Is this a readmission?: No    Elopement Risk  Legal Hold: No  Ambulatory or Self Mobile in Wheelchair: No-Not an Elopement Risk  Disoriented: No  Psychiatric Symptoms: None  History of Wandering: No  Elopement this Admit: No  Vocalizing Wanting to Leave: No  Displays Behaviors, Body Language Wanting to Leave: No-Not at Risk for Elopement  Elopement Risk: Not at Risk for Elopement    Interdisciplinary Discharge Planning  Primary Care Physician: Madisyn Mccullough MD  (last saw 8-10 months ago)  Lives with - Patient's Self Care Capacity: Spouse (states spouse can take care of pt once she returns home )  Patient or legal guardian wants to designate a caregiver: Yes  Caregiver name: Colin Isaac  Support Systems: Spouse / Significant Other  Housing / Facility: [address]  Do You Take your Prescribed Medications Regularly: Yes  Mobility Issues:  (since became sick, needs FWW and w/c which has at home)  Prior Services:  (said last d/c from rehab received silver FWW)  Durable Medical Equipment:  (w/c and FWW at home)    Discharge Preparedness  What is your plan after discharge?: Other (comment) (rehab)  Prior Functional Level: Ambulatory,Independent with Activities of Daily Living,Independent with Medication Management  Difficulity with ADLs: Walking  Difficulty  with ADLs Comment: uses FWW and w/c  Difficulity with IADLs:  (spouse assists)    Functional Assesment  Prior Functional Level: Ambulatory,Independent with Activities of Daily Living,Independent with Medication Management    Finances  Prescription Coverage: Yes (preferred pharmacy [facility] at [facility] )    Vision / Hearing Impairment  Vision Impairment : Yes  Right Eye Vision: Wears Glasses  Left Eye Vision: Wears Glasses  Hearing Impairment : Yes  Hearing Impairment: Right Ear  Does Pt Need Special Equipment for the Hearing Impaired?: No              Domestic Abuse  Have you ever been the victim of abuse or violence?: No  Physical Abuse or Sexual Abuse: No  Verbal Abuse or Emotional Abuse: No  Possible Abuse/Neglect Reported to:: Not Applicable    Psychological Assessment  History of Substance Abuse: None  History of Psychiatric Problems: No         Anticipated Discharge Information  Discharge Disposition: Still a Patient (30)  Discharge Address: home address verified on face sheet

## 2021-12-14 NOTE — THERAPY
Occupational Therapy   Initial Evaluation     Patient Name: Donna Isaac  Age:  56 y.o., Sex:  female  Medical Record #: [id number]  Today's Date: 12/14/2021       Precautions: Fall Risk, Swallow Precautions   Comments: cardiac arrest in hospital    Assessment    Patient is 56 y.o. female with h/o [place]'s Dz, fibromyalgia, MI, TBI, CHF, HTN, obesity, admitted with abdominal pain, syncope. Pt dx with pancreatitis, a-fib, GI bleed, DVT, PE. Course complicated by cardiac arrest and IVC filter placement. Pt seen for OT eval. Required mod-max A for all functional mobility, total A for LB ADL. Pt able to stand with 2-assist but heavy max A to pivot to bedside recliner. Neeru sling in place for transfer BTB. Pt A+Ox4, but very slow to respond with flat affect. Pt is limited by: generalized weakness, balance impairment, motor incoordination, mild cognitive deficits. Will benefit from ongoing acute OT with recommendation for post-acute placement.     Plan    Recommend Occupational Therapy 3 times per week until therapy goals are met for the following treatments:  Adaptive Equipment, Cognitive Skill Development, Neuro Re-Education / Balance, Self Care/Activities of Daily Living, Therapeutic Activities and Therapeutic Exercises.    DC Equipment Recommendations: Unable to determine at this time  Discharge Recommendations: Recommend post-acute placement for additional occupational therapy services prior to discharge home      Objective       12/14/21 1029   Prior Living Situation   Housing / Facility [address]   Steps Into Home 0   Bathroom Set up Walk In Shower;Bathtub / Shower Combination   Equipment Owned Front-Wheel Walker   Comments Pt reports spouse can assist as needed on DC   Prior Level of ADL Function   Comments Pt is normally independent with BADL; reports recent decline leading up to admit    Prior Level of IADL Function   Comments Pt is normally independent with I-ADL and functional mobility; reports  recent decline leading up to admit    Cognition    Speech/ Communication Delayed Responses   Level of Consciousness Alert   New Learning Impaired   Attention Impaired   Comments Pt cooperative, flat affect    Active ROM Upper Body   Active ROM Upper Body  WDL   Strength Upper Body   Gross Strength Generalized Weakness, Equal Bilaterally.    Coordination Upper Body   Comments limited by weakness    Balance Assessment   Sitting Balance (Static) Fair   Sitting Balance (Dynamic) Fair -   Standing Balance (Static) Trace +   Standing Balance (Dynamic) Trace   Weight Shift Sitting Poor   Weight Shift Standing Absent   Comments 2-person assist for stand    Bed Mobility    Supine to Sit Moderate Assist   Sit to Supine   (NT; up to chair post )   Scooting Moderate Assist  (seated)   ADL Assessment   Grooming Moderate Assist  (face wiping, suction oral care)   Lower Body Dressing Total Assist  (sock management )   Toileting Total Assist  (BMS, Perez cath )   Functional Mobility   Sit to Stand Maximal Assist   Bed, Chair, Wheelchair Transfer Maximal Assist   Transfer Method Stand Step   Mobility Supine > EOB, sit to stands, pivot to bedside chair    Short Term Goals   Short Term Goal # 1 Pt will complete ADL transfers with min A   Short Term Goal # 2 Pt will complete gown change with supv    Short Term Goal # 3 Pt will complete seated grooming with supv

## 2021-12-14 NOTE — CARE PLAN
Problem: Hyperinflation  Goal: Prevent or improve atelectasis  Description: 1. Instruct incentive spirometry usage  2.  Perform hyperinflation therapy as indicated  Outcome: Progressing  Flowsheets (Taken 12/13/2021 1751)  Hyperinflation Protocol Goals/Outcome: Improvement in Repeat CXR  Hyperinflation Protocol Indications: Atelectasis Documented by Chest X-Ray

## 2021-12-14 NOTE — PROGRESS NOTES
Patient requiring increase O2, went from 3L during change of shift to 15L on an oxymask. Patient noted to have increased WOB, with Shallow breaths and lungs sounding diminished upon auscultation. Dr. Gonda Notified.     Orders received for 40mg of Lasix, an ABG, XRAY and patient to be placed on BiPAP. RT notified

## 2021-12-14 NOTE — THERAPY
"Speech Language Pathology   Clinical Swallow Evaluation     Patient Name: Donna Isaac  AGE:  56 y.o., SEX:  female  Medical Record #: [id number]  Today's Date: 12/14/2021     Precautions  Precautions: Swallow Precautions ( See Comments)    Assessment    55 YO female admitted 12/1 2/2 epigastric abdominal pain. PMHx: MI, TBI, CHF, Paoli's disease, fibromyalgia, MRSA sinusitis. CMHx:cardiac arrest, atelectasis, hypokalemia, PAF, hypocalcemia,hypomagnesemia, hemoperitoneum, GI hemorrhage, shock, acute pancreatitis, PE, DVT, acute,pancreatitis,thrombocytopenia, adrenal insufficiency, MARY LOU. CXR 12/14 \"Mild edema and/or infiltrate, particularly on the right, stable since prior study\". Intubated 12/2-12/12. No SLP hx at [facility].     Pt seen this date for clinical swallow evaluation 2/2 pt removal of Cortrak. Pt seen sitting up in chair, on HFNC at 30L/50% FiO2, spouse present. Oral mech exam completed, no gross oral motor deficits appreciated. Dentition intact. Vocal quality clear with mildly reduced vocal intensity. PO trials of ice, MTL, liquidized, puree, minced and moist, soft and bite size, regular and thins assessed. Pt demonstrated functional mastication of soft and bite size trials. Prolonged mastication and mod oral residue appreciated with trials of regular. Increased work of breathing and change in vocal quality appreciated with trials of thins, which is concerning for possible penetration/aspiration.     Given prolonged intubation, confusion, weakness and intermittent s/sx of aspiration, pt is appropriate for a modified diet with 1:1 supervision and feeding assistance. HOLD PO with any difficulty, s/sx of aspiration or increased oxygen needs and page SLP.    1. Recommend diet of soft and bite size/mildly thick liquids with adherence to the following: upright at 90* for PO, 1:1 feeding, small bites/sips, no straws, slow rate.  2. Meds floated in puree.   3. Okay for 5-10 single ice chips per hour with " "nursing or trained family, DO NOT LEAVE AT BEDSIDE.         SLP following.     Plan    Recommend Speech Therapy 3 times per week until therapy goals are met for the following treatments:  Dysphagia Training, Patient / Family / Caregiver Education.    Discharge Recommendations: (P) Recommend post-acute placement for additional speech therapy services prior to discharge home    Subjective    Pt awake and eager for PO, confused, telling spouse \"go get my x from the closet\" and had to be re-oriented to place.      Objective       12/14/21 1135   Oral Motor Eval    Is Patient Able to Complete Oral Motor Eval Yes, Within Normal Limits   Laryngeal Function   Voice Quality Within Functional Limits   Volutional Cough Within Functional Limits   Excursion Upon Swallow Complete   Oral Food Presentation   Ice Chips Within Functional Limits   Single Swallow Mildly Thick (2) - (Nectar Thick)  Within Functional Limits   Single Swallow Thin (0) Moderate   Liquidised (3) Within Functional Limits   Pureed (4) Within Functional Limits   Minced & Moist (5) - (Dysphagia II) Within Functional Limits   Soft & Bite-Sized (6) - (Dysphagia III) Within Functional Limits   Regular (7) Moderate   Self Feeding Needs Assistance   Tracheostomy   Tracheostomy  No   Dysphagia Strategies / Recommendations   Strategies / Interventions Recommended (Yes / No) Yes   Compensatory Strategies Strict 1:1 Feeding;Head of Bed 90 Degrees During Eating / Drinking;No Straws;Multiple Swallows;Single Sips / Bites;No Talking During Eating / Drinking;Assistance Needed for Meal Tray Set-up   Diet / Liquid Recommendation Mildly Thick (2) - (Nectar Thick);Soft & Bite-Sized (6) - (Dysphagia III)   Medication Administration  Float Whole with Puree   Therapy Interventions Dysphagia Therapy By Speech Language Pathologist   Dysphagia Rating   Nutritional Liquid Intake Rating Scale Thickened beverages (mildly thick unless otherwise specified)   Nutritional Food Intake Rating " "Scale Total oral diet with multiple consistencies but requiring special preparation or compensations   Patient / Family Goals   Patient / Family Goal #1 \"pain meds\"   Short Term Goals   Short Term Goal # 1 Pt will consume a diet of SB6/MT2 with no s/sx of aspiration and min cues.         "

## 2021-12-14 NOTE — DISCHARGE PLANNING
Care Transition Team Discharge Planning    Anticipated Discharge Disposition: TBD     Action: Per chart review, pt remains in ICU level of care.      Barriers to Discharge: not medically cleared/stable    Plan: LSW will continue to follow, and assist with d/c planning.

## 2021-12-14 NOTE — PROGRESS NOTES
Critical Care Progress Note    Date of admission  12/1/2021    Chief Complaint  56 y.o. female admitted 12/1/2021 with acute pancreatitis, pulmonary embolism.  She has a history of prior gastrointestinal bleeding from unknown source, MRSA sinusitis on outpatient antibiotics, [place]'s disease, GERD, primary hypertension, hemochromatosis, hypothyroidism, traumatic brain injury, myocardial infarction and fibromyalgia.    Hospital Course    12/2 -    vent day 1.  Trend hemoglobin and platelet count and transfuse blood products as required.  Full vent support.  Inhaled Flolan for RV dysfunction following cardiac arrest.  12/3 -    vent day 2.  Start bicarbonate drip for MARY LOU.  Titrating norepinephrine.  Wean off Flolan.  12/4 -    vent day 3.  Renal function worse.  Titrating norepinephrine.  12/5 -    vent day 4.  Blood pressure increased.  Change dexmedetomidine to propofol.  12/6 -    vent day 5.  Arrange for IVC filter placement.  Nephrology on board.  12/7 -    vent day 6.  IVC filter placed yesterday.  Renal function improving.  Taper hydrocortisone.  12/8 -    vent day 7.  Improving renal function.  Taper hydrocortisone.  12/9 -    vent day 8.  Start NPH.  Change propofol to dexmedetomidine.  12/10 - V#9, titrating norepinephrine-off, dexmedetomidine, FiO2 increased to 0.6, start lasix  12/11 - V#10, more alert, off pressors, SAT/SBT ongoing, FiO2 weaned to 0.4  12/12 -  V#11, SAT/SBT ongoing, poor weans yesterday-no change today but R SBI normal, O2 30%, neg 3.9L, TX FOB without significant secretions, liberation trial  12/13 -  Extubated yesterday, borderline sats 6L nasal cannula, very difficult to mobilize  12/14 - BiPAP overnight, 12/6-60% tolerating HFNC today, passed swallow eval, no bleeding on heparin drip    Interval Problem Update  Reviewed last 24 hour events:    A&O x4  Follows  SR/ST 90-100s  SBp 110-120s  UO good, I/Os - neg 897 cc/24hrs - ARB neg 4.45 L  Lasix twice daily  BiPAP 12/6, 60%,  respirate 18, tidal volume ~500, WOB low, 94-95%  CXR BB ATX  ABG 7.51/41/82  Tm 100.8  WBC 15.9  Vanco/Zosyn - to complete 12/23  No bleeding clinically, hemoglobin unchanged 8.5  Heparin drip no bolus, Xa 0.33  TF 50  PPI twice daily  Mag 1.6  Hydrocortisone/dexamethasone/fludrocortisone-[facility] Rx  Transaminases slightly better    Alternate BiPAP with HFNC  Continue forced diuresis  Aggressive pulmonary toilet  ABG as needed, monitor for need to re-intubate  Replete magnesium/K/Phos  Monitor for the need to bump hydrocortisone to stress doses  Lidocaine patch  Aspiration precautions       YESTERDAY  A&O x4  SR 90s  SBp 100-140s  TF 50  UO great  Lasix  I/Os - neg 1.53 L  BUN/creatinine still improving  Sodium normalized  LFTs improving  5 lpm NC  T-max 100.9  Blood sugars running in the 140-200s-insulin dosing noted  Ceftriaxone/vancomycin per ID for sinusitis-to complete 12/21  Hydrocortisone/dexamethasone/fludrocortisone for Valentín's  PPI      Speech see and obtain swallow eval this a.m.  PT/OT-likely needs rehab, physiatry to be consulted  Chair BID  Heparin drip, no bolus-if no bleeding in 72 hours consider starting oral therapy  Replete magnesium    Review of Systems  Review of Systems   Constitutional: Positive for malaise/fatigue (Improved). Negative for chills and fever.   HENT: Negative for congestion, sinus pain and sore throat.    Eyes: Negative.    Respiratory: Positive for shortness of breath (Only with exertion). Negative for cough, hemoptysis and sputum production.    Cardiovascular: Positive for chest pain (Lidocaine patch has helped) and leg swelling (Continues to slowly improve with Lasix). Negative for palpitations.   Gastrointestinal: Negative for abdominal pain, blood in stool, nausea and vomiting.   Genitourinary: Negative for hematuria.   Musculoskeletal: Positive for back pain.   Neurological: Positive for weakness (Diffuse, improved). Negative for focal weakness and headaches.    Endo/Heme/Allergies: Does not bruise/bleed easily.   Psychiatric/Behavioral: The patient is not nervous/anxious.        Vital Signs for last 24 hours   Temp:  [37.8 °C (100 °F)-38 °C (100.4 °F)] 38 °C (100.4 °F)  Pulse:  [] 93  Resp:  [18-34] 22  BP: ()/() 116/76  SpO2:  [88 %-97 %] 92 %    Hemodynamic parameters for last 24 hours       Respiratory Information for the last 24 hours       Physical Exam   Physical Exam  Vitals reviewed.   Constitutional:       Appearance: She is obese. She is not ill-appearing (Improved), toxic-appearing or diaphoretic.      Interventions: She is not intubated.Face mask in place.      Comments: BiPAP   HENT:      Head: Normocephalic and atraumatic.      Nose: Nose normal.      Mouth/Throat:      Mouth: Mucous membranes are moist.      Pharynx: Oropharynx is clear.   Eyes:      General: No scleral icterus.     Conjunctiva/sclera: Conjunctivae normal.      Pupils: Pupils are equal, round, and reactive to light.   Cardiovascular:      Rate and Rhythm: Regular rhythm. Tachycardia present.      Pulses: Normal pulses.      Heart sounds: No murmur heard.  No gallop.       Comments: ST  Pulmonary:      Effort: No accessory muscle usage. She is not intubated.      Breath sounds: Examination of the right-lower field reveals decreased breath sounds. Examination of the left-lower field reveals decreased breath sounds. Decreased breath sounds present. No wheezing or rales (Unchanged).   Abdominal:      General: Bowel sounds are normal. There is no distension.      Palpations: Abdomen is soft.      Tenderness: There is no abdominal tenderness. There is no right CVA tenderness, left CVA tenderness or guarding. Negative signs include Storm's sign.      Comments: Improved   Genitourinary:     Comments: Ana  Musculoskeletal:      Cervical back: Normal range of motion.      Right lower leg: Edema (Improved again) present.      Left lower leg: Edema (Improved again) present.       Comments: No clubbing or cyanosis   Skin:     General: Skin is warm.      Capillary Refill: Capillary refill takes less than 2 seconds.      Coloration: Skin is not cyanotic or mottled.      Nails: There is no clubbing.   Neurological:      General: No focal deficit present.      Mental Status: She is oriented to person, place, and time.      GCS: GCS eye subscore is 4. GCS verbal subscore is 5. GCS motor subscore is 6.      Cranial Nerves: Cranial nerves are intact. No cranial nerve deficit.      Sensory: No sensory deficit.      Motor: Weakness (Improved) present.      Comments: Follows   Psychiatric:         Mood and Affect: Mood normal.         Behavior: Behavior normal. Behavior is cooperative.         Thought Content: Thought content normal.      Comments: Unable to completely assess         Medications  Current Facility-Administered Medications   Medication Dose Route Frequency Provider Last Rate Last Admin   • lidocaine (LIDODERM) 5 % 1 Patch  1 Patch Transdermal Q24HR Alber Junior M.D.   1 Patch at 12/14/21 0932   • insulin regular (HumuLIN R,NovoLIN R) injection  3-14 Units Subcutaneous 4X/DAY STEPHANIE Junior M.D.   7 Units at 12/14/21 1321    And   • dextrose 50% (D50W) injection 50 mL  50 mL Intravenous Q15 MIN PRN Alber Junior M.D.       • [START ON 12/15/2021] AMILoride (MIDAMOR) tablet 5 mg  5 mg Oral Q DAY Alber Junior M.D.       • carvedilol (COREG) tablet 25 mg  25 mg Oral BID Alber Junior M.D.       • dexamethasone (DECADRON) tablet 0.5 mg  0.5 mg Oral Q12HRS Alebr Junior M.D.       • [START ON 12/15/2021] doxazosin (CARDURA) tablet 2 mg  2 mg Oral DAILY Alber Junior M.D.       • fludrocortisone (FLORINEF) tablet 0.1 mg  0.1 mg Oral BID Alber Junior M.D.       • gabapentin (NEURONTIN) capsule 200 mg  200 mg Oral Q8HRS Alber Junior M.D.   200 mg at 12/14/21 1321   • [START ON 12/15/2021] hydrocortisone (CORTEF) tablet 20 mg  20 mg Oral DAILY  Alber Junior M.D.        And   • hydrocortisone (CORTEF) tablet 10 mg  10 mg Oral Nightly Alber Junior M.D.       • [START ON 12/15/2021] levothyroxine (SYNTHROID) tablet 75 mcg  75 mcg Oral AM ES Alber Junior M.D.       • [START ON 12/15/2021] liothyronine (CYTOMEL) tablet 25 mcg  25 mcg Oral QAM AC Alber Junior M.D.       • [START ON 12/15/2021] losartan (COZAAR) tablet 100 mg  100 mg Oral Q DAY Alber Junior M.D.       • [START ON 12/15/2021] omeprazole (PRILOSEC) capsule 40 mg  40 mg Oral DAILY Alber Junior M.D.       • senna-docusate (PERICOLACE or SENOKOT S) 8.6-50 MG per tablet 2 Tablet  2 Tablet Oral BID Alber Junior M.D.        And   • polyethylene glycol/lytes (MIRALAX) PACKET 1 Packet  1 Packet Oral QDAY PRN Alber Junior M.D.        And   • magnesium hydroxide (MILK OF MAGNESIA) suspension 30 mL  30 mL Oral QDAY PRN Alber Junior M.D.        And   • bisacodyl (DULCOLAX) suppository 10 mg  10 mg Rectal QDAY PRN Alber Junior M.D.       • acetaminophen (Tylenol) tablet 650 mg  650 mg Oral Q4HRS PRN Alber Junior M.D.       • ondansetron (ZOFRAN ODT) dispertab 4 mg  4 mg Oral Q4HRS PRN Alber Junior M.D.       • oxyCODONE immediate-release (ROXICODONE) tablet 5 mg  5 mg Oral Q4HRS PRN Alber Junior M.D.        Or   • oxyCODONE immediate release (ROXICODONE) tablet 10 mg  10 mg Oral Q4HRS PRN Alber Junior M.D.       • melatonin tablet 5 mg  5 mg Oral HS PRN - MR X 1 JUVENAL ReillyRJelaniN.   5 mg at 12/13/21 0150   • heparin infusion 25,000 units in 500 mL 0.45% NACL  0-30 Units/kg/hr (Adjusted) Intravenous Continuous Alber Junior M.D. 29.1 mL/hr at 12/14/21 0707 20 Units/kg/hr at 12/14/21 0707   • insulin NPH (HumuLIN N,NovoLIN N) injection  10 Units Subcutaneous BID Alber Junior M.D.   10 Units at 12/14/21 0534   • vancomycin (VANCOCIN) 1,500 mg in  mL IVPB  15 mg/kg Intravenous Q EVENING Alber Junior  M.D.   Stopped at 12/13/21 2009   • furosemide (LASIX) injection 20 mg  20 mg Intravenous BID DIURETIC Charlie Stevens M.D.   20 mg at 12/14/21 0528   • HYDROmorphone (Dilaudid) injection 0.5-1 mg  0.5-1 mg Intravenous Q HOUR PRN Eligio Del Castillo M.D.   0.5 mg at 12/14/21 1104   • hydrALAZINE (APRESOLINE) injection 20 mg  20 mg Intravenous Q4HRS PRN Eligio Del Castillo M.D.   20 mg at 12/09/21 0115   • labetalol (NORMODYNE/TRANDATE) injection 10-20 mg  10-20 mg Intravenous Q4HRS PRN Eligio Del Castillo M.D.   20 mg at 12/11/21 0330   • MD Alert...Vancomycin per Pharmacy   Other PHARMACY TO DOSE Eligio Del Castillo M.D.       • cefTRIAXone (Rocephin) 2 g in  mL IVPB  2 g Intravenous Q24HRS Eligio Del Castillo M.D.   Stopped at 12/14/21 0559   • Respiratory Therapy Consult   Nebulization Continuous RT Servando Rush M.D.       • MD Alert...ICU Electrolyte Replacement per Pharmacy   Other PHARMACY TO DOSE Servando Rush M.D.       • Pharmacy Consult Request ...Pain Management Review 1 Each  1 Each Other PHARMACY TO DOSE David Yoder M.D.       • ondansetron (ZOFRAN) syringe/vial injection 4 mg  4 mg Intravenous Q4HRS PRN David Yoder M.D.   4 mg at 12/11/21 2314   • promethazine (PHENERGAN) suppository 12.5-25 mg  12.5-25 mg Rectal Q4HRS PRN David Yoder M.D.           Fluids    Intake/Output Summary (Last 24 hours) at 12/14/2021 1416  Last data filed at 12/14/2021 0400  Gross per 24 hour   Intake 1143.73 ml   Output 2155 ml   Net -1011.27 ml       Laboratory  Recent Labs     12/12/21  1246 12/12/21  1253 12/13/21 2052   [id number] 7.50  --   --    [id number] 36.4  --   --    [id number] 56.6*  --   --    DLCS0YLM 87.2*  --   --    ARTHCO3 28*  --   --    FIO2 38  --   --    ARTBE 4*  --   --    ISTATAPH  --  7.508* 7.532*   ISTATAPCO2  --  42.0* 39.3*   ISTATAPO2  --  49* 76   ISTATATCO2  --  35* 34*   [id number]  --  87* 96   ISTATARTHCO3  --  33.4* 33.0*   ISTATARTBE  --  9*  10*   ISTATTEMP  --  38.0 C 38.2 C   ISTATFIO2  --  30 100   ISTATSPEC  --  Arterial Arterial   ISTATAPHTC  --  7.492 7.513*   [id number]  --  52* 82         Recent Labs     12/12/21 0415 12/13/21 0330 12/14/21  0316   SODIUM 146* 141 142   POTASSIUM 3.5* 4.1 3.8   CHLORIDE 109 104 103   CO2 30 29 29   BUN 26* 24* 25*   CREATININE 0.55 0.49* 0.53   MAGNESIUM 1.5 1.7 1.6   PHOSPHORUS 2.4* 2.7 2.4*   CALCIUM 8.1* 8.5 8.6     Recent Labs     12/12/21 0415 12/13/21 0330 12/14/21 0316   ALTSGPT 65* 56* 50   ASTSGOT 46* 36 33   ALKPHOSPHAT 174* 167* 176*   TBILIRUBIN 0.5 0.5 0.5   GLUCOSE 179* 178* 170*     Recent Labs     12/12/21 0415 12/13/21 0330 12/14/21 0316   WBC 15.6* 16.7* 15.9*   NEUTSPOLYS 83.00* 84.70* 80.20*   LYMPHOCYTES 6.90* 5.80* 8.40*   MONOCYTES 5.10 5.50 6.20   EOSINOPHILS 0.60 0.50 0.70   BASOPHILS 0.20 0.20 0.10   ASTSGOT 46* 36 33   ALTSGPT 65* 56* 50   ALKPHOSPHAT 174* 167* 176*   TBILIRUBIN 0.5 0.5 0.5     Recent Labs     12/12/21 0415 12/13/21 0330 12/13/21  0950 12/14/21  0316   RBC 3.11* 3.11*  --  3.21*   HEMOGLOBIN 8.4* 8.3*  --  8.5*   HEMATOCRIT 27.6* 28.2*  --  28.6*   PLATELETCT 296 332  --  379   PROTHROMBTM  --   --  13.4  --    APTT  --   --  31.7  --    INR  --   --  1.05  --        Imaging  X-Ray:  I have personally reviewed the images and compared with prior images. and My impression is: Unchanged left lower lobe opacities    Assessment/Plan  * Cardiac arrest (HCC)  Assessment & Plan  S/p PEA, asystole and ventricular tachycardia in ED - precipitated by hemorrhagic shock  ROSC after 7 rounds of CPR, IV epinephrine, IV bicarbonate solution and massive transfusion protocol  ECHO with RV dysfunction and RVSP of 50 mmHg  Hemodynamics improved, weaned off pressors  Neurologically improved, extubated 12/12, diffusely weak  Lidocaine patch for chest wall pain    Atelectasis  Assessment & Plan  Multifactorial-status post arrest, prolonged course of ventilator, weak and not moving  much, obese with episode of pancreatitis  Monitor for the need for therapeutic bronchoscopy, active TX FOB 12/8, follow-up TX FOB with extubation 12/12 with minimal plugs  Lysed to chair twice daily  Up for meals, patient and RN encouraged strongly  I-S, may need IPPV-reviewed with RT  RT protocols  Serial chest x-ray as needed    Hypokalemia  Assessment & Plan  Replete potassium, ERP    Paroxysmal atrial fibrillation (HCC)  Assessment & Plan  Rate controlled, currently in SR  Continue carvedilol, 25 mg twice daily  Optimize potassium and magnesium  [id number] score high, 10% risk CVA, will need anticoagulation when/if becomes clinically appropriate  Initiating heparin drip without bolus 12/13    Hypocalcemia  Assessment & Plan  Replete calcium, ERP    Hypomagnesemia  Assessment & Plan  Replete magnesium, ERP    Hemoperitoneum  Assessment & Plan  Surgery has evaluated  No indication for surgery at this time  Trend hemoglobin and transfuse PRBCs to keep hemoglobin greater than 7-no change in greater than 1 week  Unchanged hemoglobin times approximately 1 week  Status post IVC filter 12/6  Heparin PPx reinitiated 12/10, no bleeding, will start heparin drip no bolus for PE/PAF 12/13  HGB unchanged, no clinical signs of bleeding-continue to monitor  Reverse with protamine or FFC as needed    Gastrointestinal hemorrhage  Assessment & Plan  History of gastrointestinal hemorrhage with no source identified-no recurrence/monitoring  Possible gastrointestinal hemorrhage provoked by IV heparin for pulmonary embolism - no gross blood noted in stool - positive for occult blood  Continue PPI twice daily  GI consult noted, if rebleeds f/u GI consultation  Trend hemoglobin and transfuse PRBCs to keep hemoglobin greater than 7  Heparin subcu PPx restarted 12/10, trial IV heparin no bolus starting 12/13, consider enteral agent if no bleeding after several days  Status post IVC filter off anticoagulation for A. Fib  HGB unchanged,  no clinical signs of bleeding-continue to monitor  Reverse with protamine or FFC as needed    DVT (deep venous thrombosis) ([facility])  Assessment & Plan  Imaging reveals LUE DVT associated with her PICC line and a distal LLE DVT  Anticoagulation strictly contraindicated due to acute gastrointestinal hemorrhage and hemoperitoneum  IVC filter placement on 12/6  Also needs anticoagulation from a PE & PAF [id number] 2 risk score - 10%  Resuming IV heparin without bolus 12/13    Shock (HCC)- (present on admission)  Assessment & Plan  Hemorrhagic shock with acute gastrointestinal blood loss and vasodistributive shock  Shock has resolved, weaned off norepinephrine  Hemoglobin grossly unchanged monitoring for hypotension/bleeding since resuming IV heparin    Acute pancreatitis  Assessment & Plan  Resolved likely  She is having bowel movements  She is tolerating enteral tube feedings  She denies N/V/abdominal pain  Continue to monitor    PE (pulmonary thromboembolism) ([facility])- (present on admission)  Assessment & Plan  Anticoagulation was contraindicated initially  IVC filter placement on 12/6  No change in hemoglobin or significant GI bleeding since heparin stopped, greater than 1 week  No issues with subcu heparin PPx and platelet count has normalized  DC subcu heparin PPx and start heparin without bolus by drip 12/13  Consider transitioning to enteral agent if tolerates IV heparin over the next 72+ hours  Monitoring for bleeding      Debilitated patient  Assessment & Plan  Very complex medical patient now post arrest and 10-11-day ventilator course with severe diffuse weakness needing Neeru lift to transfer to chair  PT/OT to eval and treat  Speech therapy consult, swallow evaluation  Appears to be doing cognitively okay, monitor for need for cognitive eval  Rehab versus LTAC  Physiatry consultation noted  Not a candidate for SNU given her complex medical scenario and ongoing treatments as she significantly improves    Acute  blood loss anemia- (present on admission)  Assessment & Plan  Suspect gastrointestinal source of blood loss as well as acute hemoperitoneum-no recurrence clinically-monitoring  10 you to trend hemoglobin and transfuse PRBCs to keep hemoglobin greater than 7  Likely no recurrence, hemoglobin unchanged for approximately >7 days  That is post 3 days heparin SQ PPx-> initiate IV heparin without bolus  Start trial anticoagulation no bleeding on heparin for DVT/PE/PAF    Thrombocytopenia (HCC)- (present on admission)  Assessment & Plan  Trend platelet count and thromboelastogram with platelet mapping as needed  Transfuse platelets as required  Count normalized 12/9-remains normal  Continue to monitor slowly with heparin drip starting again 12/13    Hemochromatosis- (present on admission)  Assessment & Plan  History of    Adrenal insufficiency (Gates's disease) (HCC)- (present on admission)  Assessment & Plan  Continue hydrocortisone to 20 mg twice daily  Continue Florinef, 0.1 mg twice daily  Monitor for the need to bump hydrocortisone up with stress  Monitoring hemodynamics/electrolytes closely    Elevated LFTs- (present on admission)  Assessment & Plan  Due to ischemic hepatopathy from cardiac arrest and shock  Trend enzymes and synthetic function  Avoid hepatotoxins  Mildly trending up-continue to monitor    MRSA and E. coli sinusitis  Assessment & Plan  On outpatient IV antibiotics - LUE PICC in place  Continue vancomycin and ceftriaxone-6-week course of antibiotics planned per ID-to complete 12/21 per pharmacy  monitor for secondary infections and antibiotic complications    Acute kidney injury (HCC)- (present on admission)  Assessment & Plan  Continues to slowly improve, creatinine normalized and BUN still coming down  Suspect ATN due to shock in lady with underlying atrophic kidneys  Monitor renal function and urine output  Avoid nephrotoxins and renal dose medications  Serial BMP    Primary hypertension-  (present on admission)  Assessment & Plan  Goal SBP less than 160  Hydralazine and labetalol as necessary to achieve blood pressure goals-if frequently required adjust enteral regimen  Continue amiloride, 5 mg daily  Continue doxazosin, 2 mg daily  Continue carvedilol, 25 mg twice daily  Reassess daily-if significant bleeding occurs with heparin reduce regimen as clinically appropriate    Hypothyroidism- (present on admission)  Assessment & Plan  Continue levothyroxine and liothyronine  Repeat TSH in 4 weeks, sooner if significant arrhythmias occur without obvious etiology  Most recent TSH borderline low    Obesity (BMI 35.0-39.9 without comorbidity)- (present on admission)  Assessment & Plan  Behavioral modification and weight loss encouraged  Hypothyroid on repletion therapy  TSH borderline low    Acute respiratory failure with hypoxia (HCC)- (present on admission)  Assessment & Plan  Intubated 12/2-extubated 12/12  RT protocols  Mobility level 2-3  Aggressive pulmonary toilet, repeat TX FOB as needed  Forced diuresis ongoing - dose with diamox?  Serial chest x-ray as needed  BiPAP for support post extubation as needed  Keep in ICU 1 more day?     VTE:  Contraindicated  Ulcer: PPI  Lines: Central Line  Ongoing indication addressed and Perez Catheter  Ongoing indication addressed     I have performed a physical exam and reviewed and updated ROS and Plan today (12/14/2021). In review of yesterday's note (12/13/2021), there are no changes except as documented above.     Discussed patient condition and risk of morbidity and/or mortality with RN, RT, Pharmacy, , Charge nurse / hot rounds and Patient     The patient remains critically ill.  Patient requiring BiPAP for hypoxemia and hypoventilation.  Alternating with HFNC.  Monitoring for the need to re-intubate.    Critical care time =40 minutes in directly providing and coordinating critical care and extensive data review.  No time overlap and excludes  procedures.

## 2021-12-14 NOTE — THERAPY
Physical Therapy   Initial Evaluation     Patient Name: Donna Isaac  Age:  56 y.o., Sex:  female  Medical Record #: [id number]  Today's Date: 12/14/2021     Precautions: Swallow Precautions, Fall Risk      Assessment  Patient is a 56 y.o. female admitted with atelectasis, pancreatitis, a fib, GIB, DVT, and PE. Pmhx includes [place]'s dz, fibromyalgia, MI, TBI, CHF, and HTN. Hospital stay complicated by cardiac arrest and IVC filter placement. Pt seen for PT evaluation at this time. Pt required mod-max A for mobility, completed STS x3 with max A x2 and completed SPT to chair. Standing tolerance limited by fatigue and BLE weakness, pt unable to achieve foot clearance for stepping at this time. Pt will benefit from continued PT to progress mobility and postacute placement to maximize safety and functional independence prior tor return home. Will follow. Recommend OOB to chair with nursing, jesús sling under pt in chair for xfr back to bed.     Plan  Recommend Physical Therapy 3 times per week until therapy goals are met for the following treatments:  Bed Mobility, Gait Training, Neuro Re-Education / Balance, Self Care/Home Evaluation, Therapeutic Activities and Therapeutic Exercises  DC Equipment Recommendations: Unable to determine at this time  Discharge Recommendations: Recommend post-acute placement for additional physical therapy services prior to discharge home          12/14/21 1028   Prior Living Situation   Prior Services None   Housing / Facility 1 Story House   Steps Into Home 0   Steps In Home 0   Bathroom Set up Walk In Shower;Bathtub / Shower Combination   Equipment Owned Front-Wheel Walker   Lives with -  Spouse   Comments reports spouse can assist as needed, does not work   Prior Level of Functional Mobility   Bed Mobility Independent   Transfer Status Independent   Ambulation Independent   Distance Ambulation (Feet) community distances   Assistive Devices Used None   Stairs Independent    Comments reports no difficulty with mobility or ADLs PTA   Cognition    Speech/ Communication Delayed Responses   Level of Consciousness Alert   Attention Impaired   Comments lethargic but pleasant and appears motivated.    Balance Assessment   Sitting Balance (Static) Fair   Sitting Balance (Dynamic) Fair -   Standing Balance (Static) Trace +   Standing Balance (Dynamic) Trace   Weight Shift Sitting Poor   Weight Shift Standing Absent   Comments standing with HHA x2. initially no LOB sitting EOB, then pt with posterior LOB and poor trunk control requiring max A to return upright   Gait Analysis   Gait Level Of Assist Unable to Participate   Weight Bearing Status no restrictions   Comments unable to maintain standing long enough to weight shift and achieve adequate foot clearance for stepping   Bed Mobility    Supine to Sit Moderate Assist   Scooting Moderate Assist   Functional Mobility   Sit to Stand Maximal Assist   Bed, Chair, WC Transfer Maximal Assist   Transfer Method Stand Pivot   Mobility EOB > chair   Short Term Goals    Short Term Goal # 1 pt will perform supine <> sit with min A in 6 visits for improved independence   Short Term Goal # 2 pt will perform STS with min A in 6 visits for improved independence   Short Term Goal # 3 pt will perform SPT with min A in 6 visits to sit up in chair   Short Term Goal # 4 pt will ambulate 50ft with FWW and min A in 6 visits to initiate gait

## 2021-12-15 ENCOUNTER — APPOINTMENT (OUTPATIENT)
Dept: RADIOLOGY | Facility: MEDICAL CENTER | Age: 56
DRG: 981 | End: 2021-12-15
Attending: INTERNAL MEDICINE
Payer: MEDICARE

## 2021-12-15 PROBLEM — I27.20 PULMONARY HYPERTENSION (HCC): Status: ACTIVE | Noted: 2021-12-15

## 2021-12-15 LAB
ALBUMIN SERPL BCP-MCNC: 2.6 G/DL (ref 3.2–4.9)
ALBUMIN/GLOB SERPL: 0.8 G/DL
ALP SERPL-CCNC: 165 U/L (ref 30–99)
ALT SERPL-CCNC: 43 U/L (ref 2–50)
ANION GAP SERPL CALC-SCNC: 10 MMOL/L (ref 7–16)
ANISOCYTOSIS BLD QL SMEAR: ABNORMAL
AST SERPL-CCNC: 30 U/L (ref 12–45)
BASOPHILS # BLD AUTO: 0.2 % (ref 0–1.8)
BASOPHILS # BLD AUTO: 0.2 % (ref 0–1.8)
BASOPHILS # BLD AUTO: 0.5 % (ref 0–1.8)
BASOPHILS # BLD: 0.03 K/UL (ref 0–0.12)
BASOPHILS # BLD: 0.04 K/UL (ref 0–0.12)
BASOPHILS # BLD: 0.08 K/UL (ref 0–0.12)
BILIRUB SERPL-MCNC: 0.5 MG/DL (ref 0.1–1.5)
BUN SERPL-MCNC: 21 MG/DL (ref 8–22)
CALCIUM SERPL-MCNC: 8.4 MG/DL (ref 8.5–10.5)
CHLORIDE SERPL-SCNC: 108 MMOL/L (ref 96–112)
CO2 SERPL-SCNC: 26 MMOL/L (ref 20–33)
COMMENT 1642: NORMAL
CREAT SERPL-MCNC: 0.43 MG/DL (ref 0.5–1.4)
EOSINOPHIL # BLD AUTO: 0.02 K/UL (ref 0–0.51)
EOSINOPHIL # BLD AUTO: 0.07 K/UL (ref 0–0.51)
EOSINOPHIL # BLD AUTO: 0.1 K/UL (ref 0–0.51)
EOSINOPHIL NFR BLD: 0.1 % (ref 0–6.9)
EOSINOPHIL NFR BLD: 0.4 % (ref 0–6.9)
EOSINOPHIL NFR BLD: 0.7 % (ref 0–6.9)
ERYTHROCYTE [DISTWIDTH] IN BLOOD BY AUTOMATED COUNT: 71.9 FL (ref 35.9–50)
ERYTHROCYTE [DISTWIDTH] IN BLOOD BY AUTOMATED COUNT: 73 FL (ref 35.9–50)
ERYTHROCYTE [DISTWIDTH] IN BLOOD BY AUTOMATED COUNT: 73 FL (ref 35.9–50)
GLOBULIN SER CALC-MCNC: 3.3 G/DL (ref 1.9–3.5)
GLUCOSE BLD-MCNC: 138 MG/DL (ref 65–99)
GLUCOSE BLD-MCNC: 138 MG/DL (ref 65–99)
GLUCOSE BLD-MCNC: 164 MG/DL (ref 65–99)
GLUCOSE BLD-MCNC: 166 MG/DL (ref 65–99)
GLUCOSE BLD-MCNC: 193 MG/DL (ref 65–99)
GLUCOSE SERPL-MCNC: 133 MG/DL (ref 65–99)
HCT VFR BLD AUTO: 25 % (ref 37–47)
HCT VFR BLD AUTO: 27.9 % (ref 37–47)
HCT VFR BLD AUTO: 28.7 % (ref 37–47)
HGB BLD-MCNC: 7.4 G/DL (ref 12–16)
HGB BLD-MCNC: 8.2 G/DL (ref 12–16)
HGB BLD-MCNC: 8.6 G/DL (ref 12–16)
IMM GRANULOCYTES # BLD AUTO: 0.39 K/UL (ref 0–0.11)
IMM GRANULOCYTES # BLD AUTO: 0.42 K/UL (ref 0–0.11)
IMM GRANULOCYTES # BLD AUTO: 0.43 K/UL (ref 0–0.11)
IMM GRANULOCYTES NFR BLD AUTO: 2.2 % (ref 0–0.9)
IMM GRANULOCYTES NFR BLD AUTO: 2.5 % (ref 0–0.9)
IMM GRANULOCYTES NFR BLD AUTO: 2.8 % (ref 0–0.9)
LYMPHOCYTES # BLD AUTO: 0.42 K/UL (ref 1–4.8)
LYMPHOCYTES # BLD AUTO: 0.57 K/UL (ref 1–4.8)
LYMPHOCYTES # BLD AUTO: 1.18 K/UL (ref 1–4.8)
LYMPHOCYTES NFR BLD: 2.5 % (ref 22–41)
LYMPHOCYTES NFR BLD: 3.3 % (ref 22–41)
LYMPHOCYTES NFR BLD: 7.7 % (ref 22–41)
MAGNESIUM SERPL-MCNC: 1.6 MG/DL (ref 1.5–2.5)
MCH RBC QN AUTO: 26.7 PG (ref 27–33)
MCH RBC QN AUTO: 26.8 PG (ref 27–33)
MCH RBC QN AUTO: 26.9 PG (ref 27–33)
MCHC RBC AUTO-ENTMCNC: 29.4 G/DL (ref 33.6–35)
MCHC RBC AUTO-ENTMCNC: 29.6 G/DL (ref 33.6–35)
MCHC RBC AUTO-ENTMCNC: 30 G/DL (ref 33.6–35)
MCV RBC AUTO: 89.1 FL (ref 81.4–97.8)
MCV RBC AUTO: 90.9 FL (ref 81.4–97.8)
MCV RBC AUTO: 91.2 FL (ref 81.4–97.8)
MICROCYTES BLD QL SMEAR: ABNORMAL
MONOCYTES # BLD AUTO: 0.46 K/UL (ref 0–0.85)
MONOCYTES # BLD AUTO: 0.9 K/UL (ref 0–0.85)
MONOCYTES # BLD AUTO: 0.95 K/UL (ref 0–0.85)
MONOCYTES NFR BLD AUTO: 2.7 % (ref 0–13.4)
MONOCYTES NFR BLD AUTO: 5.5 % (ref 0–13.4)
MONOCYTES NFR BLD AUTO: 5.9 % (ref 0–13.4)
MORPHOLOGY BLD-IMP: NORMAL
MORPHOLOGY BLD-IMP: NORMAL
NEUTROPHILS # BLD AUTO: 12.67 K/UL (ref 2–7.15)
NEUTROPHILS # BLD AUTO: 15.28 K/UL (ref 2–7.15)
NEUTROPHILS # BLD AUTO: 15.56 K/UL (ref 2–7.15)
NEUTROPHILS NFR BLD: 82.7 % (ref 44–72)
NEUTROPHILS NFR BLD: 88.1 % (ref 44–72)
NEUTROPHILS NFR BLD: 92 % (ref 44–72)
NRBC # BLD AUTO: 0.03 K/UL
NRBC # BLD AUTO: 0.04 K/UL
NRBC # BLD AUTO: 0.07 K/UL
NRBC BLD-RTO: 0.2 /100 WBC
NRBC BLD-RTO: 0.3 /100 WBC
NRBC BLD-RTO: 0.4 /100 WBC
PHOSPHATE SERPL-MCNC: 3.3 MG/DL (ref 2.5–4.5)
PLATELET # BLD AUTO: 340 K/UL (ref 164–446)
PLATELET # BLD AUTO: 363 K/UL (ref 164–446)
PLATELET # BLD AUTO: 366 K/UL (ref 164–446)
PLATELET BLD QL SMEAR: NORMAL
PMV BLD AUTO: 10.9 FL (ref 9–12.9)
PMV BLD AUTO: 11.4 FL (ref 9–12.9)
PMV BLD AUTO: 11.7 FL (ref 9–12.9)
POLYCHROMASIA BLD QL SMEAR: NORMAL
POTASSIUM SERPL-SCNC: 4.1 MMOL/L (ref 3.6–5.5)
PROT SERPL-MCNC: 5.9 G/DL (ref 6–8.2)
RBC # BLD AUTO: 2.75 M/UL (ref 4.2–5.4)
RBC # BLD AUTO: 3.06 M/UL (ref 4.2–5.4)
RBC # BLD AUTO: 3.22 M/UL (ref 4.2–5.4)
RBC BLD AUTO: PRESENT
SODIUM SERPL-SCNC: 144 MMOL/L (ref 135–145)
UFH PPP CHRO-ACNC: 0.5 IU/ML
WBC # BLD AUTO: 15.3 K/UL (ref 4.8–10.8)
WBC # BLD AUTO: 16.9 K/UL (ref 4.8–10.8)
WBC # BLD AUTO: 17.3 K/UL (ref 4.8–10.8)

## 2021-12-15 PROCEDURE — 92526 ORAL FUNCTION THERAPY: CPT

## 2021-12-15 PROCEDURE — 700102 HCHG RX REV CODE 250 W/ 637 OVERRIDE(OP): Performed by: INTERNAL MEDICINE

## 2021-12-15 PROCEDURE — 85520 HEPARIN ASSAY: CPT

## 2021-12-15 PROCEDURE — 84100 ASSAY OF PHOSPHORUS: CPT

## 2021-12-15 PROCEDURE — 99292 CRITICAL CARE ADDL 30 MIN: CPT | Performed by: INTERNAL MEDICINE

## 2021-12-15 PROCEDURE — 770022 HCHG ROOM/CARE - ICU (200)

## 2021-12-15 PROCEDURE — 700105 HCHG RX REV CODE 258: Performed by: INTERNAL MEDICINE

## 2021-12-15 PROCEDURE — A9270 NON-COVERED ITEM OR SERVICE: HCPCS | Performed by: INTERNAL MEDICINE

## 2021-12-15 PROCEDURE — 83735 ASSAY OF MAGNESIUM: CPT

## 2021-12-15 PROCEDURE — 700111 HCHG RX REV CODE 636 W/ 250 OVERRIDE (IP): Performed by: INTERNAL MEDICINE

## 2021-12-15 PROCEDURE — 700101 HCHG RX REV CODE 250: Performed by: INTERNAL MEDICINE

## 2021-12-15 PROCEDURE — 82962 GLUCOSE BLOOD TEST: CPT | Mod: 91

## 2021-12-15 PROCEDURE — 94640 AIRWAY INHALATION TREATMENT: CPT

## 2021-12-15 PROCEDURE — 80053 COMPREHEN METABOLIC PANEL: CPT

## 2021-12-15 PROCEDURE — 85025 COMPLETE CBC W/AUTO DIFF WBC: CPT | Mod: 91

## 2021-12-15 PROCEDURE — 99291 CRITICAL CARE FIRST HOUR: CPT | Performed by: INTERNAL MEDICINE

## 2021-12-15 RX ORDER — MAGNESIUM SULFATE HEPTAHYDRATE 40 MG/ML
2 INJECTION, SOLUTION INTRAVENOUS ONCE
Status: COMPLETED | OUTPATIENT
Start: 2021-12-15 | End: 2021-12-15

## 2021-12-15 RX ORDER — PHENYLEPHRINE HCL IN 0.9% NACL 0.5 MG/5ML
200 SYRINGE (ML) INTRAVENOUS
Status: ACTIVE | OUTPATIENT
Start: 2021-12-15 | End: 2021-12-15

## 2021-12-15 RX ORDER — NOREPINEPHRINE BITARTRATE 0.03 MG/ML
0-30 INJECTION, SOLUTION INTRAVENOUS CONTINUOUS
Status: DISCONTINUED | OUTPATIENT
Start: 2021-12-15 | End: 2021-12-16

## 2021-12-15 RX ORDER — NOREPINEPHRINE BITARTRATE 0.03 MG/ML
INJECTION, SOLUTION INTRAVENOUS
Status: ACTIVE
Start: 2021-12-15 | End: 2021-12-15

## 2021-12-15 RX ADMIN — DEXAMETHASONE 0.5 MG: 1 TABLET ORAL at 05:20

## 2021-12-15 RX ADMIN — HYDROCORTISONE SODIUM SUCCINATE 50 MG: 100 INJECTION, POWDER, FOR SOLUTION INTRAMUSCULAR; INTRAVENOUS at 23:16

## 2021-12-15 RX ADMIN — CARVEDILOL 25 MG: 25 TABLET, FILM COATED ORAL at 05:18

## 2021-12-15 RX ADMIN — LIDOCAINE 1 PATCH: 50 PATCH TOPICAL at 10:09

## 2021-12-15 RX ADMIN — GABAPENTIN 200 MG: 100 CAPSULE ORAL at 13:20

## 2021-12-15 RX ADMIN — NOREPINEPHRINE BITARTRATE 5 MCG/MIN: 1 INJECTION, SOLUTION, CONCENTRATE INTRAVENOUS at 09:14

## 2021-12-15 RX ADMIN — INSULIN HUMAN 3 UNITS: 100 INJECTION, SOLUTION PARENTERAL at 13:07

## 2021-12-15 RX ADMIN — LIOTHYRONINE SODIUM 25 MCG: 25 TABLET ORAL at 05:17

## 2021-12-15 RX ADMIN — Medication 200 MCG: at 09:05

## 2021-12-15 RX ADMIN — Medication 200 MCG: at 09:14

## 2021-12-15 RX ADMIN — LEVOTHYROXINE SODIUM 75 MCG: 0.07 TABLET ORAL at 05:19

## 2021-12-15 RX ADMIN — HYDROCORTISONE SODIUM SUCCINATE 50 MG: 100 INJECTION, POWDER, FOR SOLUTION INTRAMUSCULAR; INTRAVENOUS at 17:50

## 2021-12-15 RX ADMIN — LOSARTAN POTASSIUM 100 MG: 50 TABLET, FILM COATED ORAL at 05:19

## 2021-12-15 RX ADMIN — CEFTRIAXONE SODIUM 2 G: 2 INJECTION, POWDER, FOR SOLUTION INTRAMUSCULAR; INTRAVENOUS at 05:19

## 2021-12-15 RX ADMIN — Medication 200 MCG: at 09:11

## 2021-12-15 RX ADMIN — AMILORIDE HYDROCLORIDE 5 MG: 5 TABLET ORAL at 05:17

## 2021-12-15 RX ADMIN — OXYCODONE HYDROCHLORIDE 10 MG: 10 TABLET ORAL at 08:48

## 2021-12-15 RX ADMIN — DOXAZOSIN 2 MG: 2 TABLET ORAL at 05:19

## 2021-12-15 RX ADMIN — FLUDROCORTISONE ACETATE 0.1 MG: 0.1 TABLET ORAL at 05:18

## 2021-12-15 RX ADMIN — HYDROCORTISONE 20 MG: 20 TABLET ORAL at 05:18

## 2021-12-15 RX ADMIN — Medication 200 MCG: at 09:26

## 2021-12-15 RX ADMIN — Medication 200 MCG: at 09:09

## 2021-12-15 RX ADMIN — FUROSEMIDE 20 MG: 10 INJECTION, SOLUTION INTRAMUSCULAR; INTRAVENOUS at 05:17

## 2021-12-15 RX ADMIN — VANCOMYCIN HYDROCHLORIDE 1250 MG: 5 INJECTION, POWDER, LYOPHILIZED, FOR SOLUTION INTRAVENOUS at 17:50

## 2021-12-15 RX ADMIN — MAGNESIUM SULFATE HEPTAHYDRATE 2 G: 2 INJECTION, SOLUTION INTRAVENOUS at 07:56

## 2021-12-15 RX ADMIN — GABAPENTIN 200 MG: 100 CAPSULE ORAL at 05:17

## 2021-12-15 RX ADMIN — HYDROCORTISONE SODIUM SUCCINATE 50 MG: 100 INJECTION, POWDER, FOR SOLUTION INTRAMUSCULAR; INTRAVENOUS at 13:20

## 2021-12-15 RX ADMIN — OMEPRAZOLE 40 MG: 20 CAPSULE, DELAYED RELEASE ORAL at 05:18

## 2021-12-15 RX ADMIN — HYDROCORTISONE SODIUM SUCCINATE 100 MG: 100 INJECTION, POWDER, FOR SOLUTION INTRAMUSCULAR; INTRAVENOUS at 09:24

## 2021-12-15 RX ADMIN — HYDROMORPHONE HYDROCHLORIDE 1 MG: 1 INJECTION, SOLUTION INTRAMUSCULAR; INTRAVENOUS; SUBCUTANEOUS at 07:54

## 2021-12-15 ASSESSMENT — ENCOUNTER SYMPTOMS
DIZZINESS: 1
ABDOMINAL PAIN: 1
PALPITATIONS: 0
VOMITING: 0
NAUSEA: 0
BLOOD IN STOOL: 0
HEMOPTYSIS: 0
SHORTNESS OF BREATH: 0

## 2021-12-15 ASSESSMENT — PAIN DESCRIPTION - PAIN TYPE
TYPE: ACUTE PAIN

## 2021-12-15 NOTE — PROGRESS NOTES
Critical Care Progress Note    Date of admission  12/1/2021    Chief Complaint  56 y.o. female admitted 12/1/2021 with acute pancreatitis, pulmonary embolism.  She has a history of prior gastrointestinal bleeding from unknown source, MRSA sinusitis on outpatient antibiotics, Callaway's disease, GERD, primary hypertension, hemochromatosis, hypothyroidism, traumatic brain injury, myocardial infarction and fibromyalgia.    Hospital Course    12/2 -    vent day 1.  Trend hemoglobin and platelet count and transfuse blood products as required.  Full vent support.  Inhaled Flolan for RV dysfunction following cardiac arrest.  12/3 -    vent day 2.  Start bicarbonate drip for MARY LOU.  Titrating norepinephrine.  Wean off Flolan.  12/4 -    vent day 3.  Renal function worse.  Titrating norepinephrine.  12/5 -    vent day 4.  Blood pressure increased.  Change dexmedetomidine to propofol.  12/6 -    vent day 5.  Arrange for IVC filter placement.  Nephrology on board.  12/7 -    vent day 6.  IVC filter placed yesterday.  Renal function improving.  Taper hydrocortisone.  12/8 -    vent day 7.  Improving renal function.  Taper hydrocortisone.  12/9 -    vent day 8.  Start NPH.  Change propofol to dexmedetomidine.  12/10 - V#9, titrating norepinephrine-off, dexmedetomidine, FiO2 increased to 0.6, start lasix  12/11 - V#10, more alert, off pressors, SAT/SBT ongoing, FiO2 weaned to 0.4  12/12 -  V#11, SAT/SBT ongoing, poor weans yesterday-no change today but R SBI normal, O2 30%, neg 3.9L, TX FOB without significant secretions, liberation trial  12/13 -  Extubated yesterday, borderline sats 6L nasal cannula, very difficult to mobilize  12/14 - BiPAP overnight, 12/6-60% tolerating HFNC today, passed swallow eval, no bleeding on heparin drip  12/15 -dropped BP into the 40s at home rounds and we resuscitated her at the bedside for 1/2-hour, NE up to 20 now back to 2, 1 L fluid bolus, hemoglobin unchanged although heparin stopped initially,  Solu-Cortef 100 mg stress dose given    Interval Problem Update  Reviewed last 24 hour events:    Arrived for rounds team and patient up in a chair eating but feeling weak  Patient not tachycardic and oxygenation acceptable although higher FiO2  Systolic BP though dropped into the 40s and required initiation of pressors.  1 L saline given after great response to Lasix yesterday with 2 L out.  Nathan-Synephrine 200 mcg boluses x5 given and norepinephrine drip initiated at 5 and titrated to 20 and systolic blood pressure relies in patient no longer symptomatic.  Solu-Cortef 100 mcg given IV x1 and oral Cortef switch to hydrocortisone IV 50 every 6 which will administer overnight.  Repeat hemoglobin form stat and had dropped from 8.6->7.4 but patient not bleeding clinically.  Heparin stopped and repeat hemoglobin obtained 8.2 without transfusion and still no bleeding.     present at the bedside throughout an updated  Suspect related to combination of diuresis for many days now about 7 L negative, a.m. BP meds and IV Dilaudid for pain all given simultaneously.  Continue to monitor for bleed and infection as well as other etiology.    Lethargic, tired-improved  Some nausea - no vomiting - minimal pain  BiPAP HS  HFNC 50/100 - 99% sat  Refused pull toilet this AM when she was not feeling well, encouraged to perform  WBC 15.3  Max 99.1  Vanco/ceftriaxone through 1223 per ID  [place] med regimen reviewed again with pharmacist  Heparin drip no bolus, Xa 0.5  PPI IV twice daily  Reviewed 12/2/21 ECHO again, normal LV systolic function, EF 60%, RV dilated, RV systolic function reduced, RVSP 50      Continue Lasix for now  Holding antihypertensive regimen for now, reduce dose and resume as clinically appropriate  Titrate HFNC  Stress HC bolus  Titrate NE  CVP trend  Hold heparin overnight, resume in a.m.?  D/c TF  Reduce NPH         YESTERDAY  A&O x4  Follows  SR/ST 90-100s  SBp 110-120s  UO good, I/Os - neg 897 cc/24hrs  - ARB neg 4.45 L  Lasix twice daily  BiPAP 12/6, 60%, respirate 18, tidal volume ~500, WOB low, 94-95%  CXR BB ATX  ABG 7.51/41/82  Tm 100.8  WBC 15.9  Vanco/Zosyn - to complete 12/23  No bleeding clinically, hemoglobin unchanged 8.5  Heparin drip no bolus, Xa 0.33  TF 50  PPI twice daily  Mag 1.6  Hydrocortisone/dexamethasone/fludrocortisone-[facility] Rx  Transaminases slightly better    Alternate BiPAP with HFNC  Continue forced diuresis  Aggressive pulmonary toilet  ABG as needed, monitor for need to re-intubate  Replete magnesium/K/Phos  Monitor for the need to bump hydrocortisone to stress doses  Lidocaine patch  Aspiration precautions    Review of Systems  Review of Systems   Constitutional: Positive for malaise/fatigue.   HENT: Negative for nosebleeds.    Respiratory: Negative for hemoptysis and shortness of breath.    Cardiovascular: Negative for chest pain and palpitations.   Gastrointestinal: Positive for abdominal pain. Negative for blood in stool, nausea and vomiting.   Neurological: Positive for dizziness.       Vital Signs for last 24 hours   Temp:  [36.9 °C (98.4 °F)-37.2 °C (99 °F)] 36.9 °C (98.4 °F)  Pulse:  [] 81  Resp:  [13-30] 20  BP: ()/() 101/58  SpO2:  [72 %-100 %] 97 %    Hemodynamic parameters for last 24 hours  CVP:  [7 MM HG-33 MM HG] 13 MM HG    Respiratory Information for the last 24 hours       Physical Exam   Physical Exam  Vitals reviewed.   Constitutional:       Appearance: She is obese. She is ill-appearing (Improved). She is not toxic-appearing or diaphoretic.      Interventions: She is not intubated.     Comments: HFNC   HENT:      Head: Normocephalic and atraumatic.      Nose: Nose normal.      Mouth/Throat:      Mouth: Mucous membranes are moist.      Pharynx: Oropharynx is clear.   Eyes:      General: No scleral icterus.     Conjunctiva/sclera: Conjunctivae normal.      Pupils: Pupils are equal, round, and reactive to light.   Neck:      Comments: L IJ  CVC  Cardiovascular:      Rate and Rhythm: Normal rate and regular rhythm.      Pulses: Normal pulses.      Heart sounds: No murmur heard.  No gallop.       Comments: ST  Pulmonary:      Effort: No accessory muscle usage. She is not intubated.      Breath sounds: Examination of the right-lower field reveals decreased breath sounds. Examination of the left-lower field reveals decreased breath sounds. Decreased breath sounds present. No wheezing or rales (Unchanged).   Abdominal:      General: Bowel sounds are normal. There is no distension.      Palpations: Abdomen is soft.      Tenderness: There is no abdominal tenderness. There is no right CVA tenderness, left CVA tenderness or guarding. Negative signs include Storm's sign.      Comments: Improved   Genitourinary:     Comments: Ana  Musculoskeletal:      Cervical back: Normal range of motion.      Right lower leg: Edema (Improved again) present.      Left lower leg: Edema (Improved again) present.      Comments: No clubbing or cyanosis   Skin:     General: Skin is warm and dry.      Capillary Refill: Capillary refill takes less than 2 seconds.      Coloration: Skin is not cyanotic or mottled.      Nails: There is no clubbing.   Neurological:      General: No focal deficit present.      Mental Status: She is oriented to person, place, and time. She is lethargic.      GCS: GCS eye subscore is 4. GCS verbal subscore is 5. GCS motor subscore is 6.      Cranial Nerves: Cranial nerves are intact. No cranial nerve deficit.      Sensory: No sensory deficit.      Motor: Weakness (Improved) present.      Comments: Follows   Psychiatric:         Mood and Affect: Mood normal. Mood is not anxious.         Behavior: Behavior normal. Behavior is not agitated. Behavior is cooperative.         Thought Content: Thought content normal.         Medications  Current Facility-Administered Medications   Medication Dose Route Frequency Provider Last Rate Last Admin   • vancomycin  (VANCOCIN) 1,250 mg in  mL IVPB  1,250 mg Intravenous Q EVENING Alber Junior M.D.       • NOREPINEPHRINE-SODIUM CHLORIDE 8-0.9 MG/250ML-% IV SOLN            • norepinephrine (Levophed) 8 mg in 250 mL NS infusion (premix)  0-30 mcg/min Intravenous Continuous Alber Junior M.D. 18.8 mL/hr at 12/15/21 1220 10 mcg/min at 12/15/21 1220   • hydrocortisone sodium succinate PF (Solu-CORTEF) 100 MG injection 50 mg  50 mg Intravenous Q6HRS Alber Junior M.D.   50 mg at 12/15/21 1320   • insulin NPH (HumuLIN N,NovoLIN N) injection  5 Units Subcutaneous BID Alber Junior M.D.       • lidocaine (LIDODERM) 5 % 1 Patch  1 Patch Transdermal Q24HR Alber Junior M.D.   1 Patch at 12/15/21 1009   • insulin regular (HumuLIN R,NovoLIN R) injection  3-14 Units Subcutaneous 4X/DAY ACHS Alber Junior M.D.   3 Units at 12/15/21 1307    And   • dextrose 50% (D50W) injection 50 mL  50 mL Intravenous Q15 MIN PRN Alber Junior M.D.       • [Held by provider] AMILoride (MIDAMOR) tablet 5 mg  5 mg Oral Q DAY Alber Junior M.D.   5 mg at 12/15/21 0517   • [Held by provider] carvedilol (COREG) tablet 25 mg  25 mg Oral BID Alber Junior M.D.   25 mg at 12/15/21 0518   • [Held by provider] dexamethasone (DECADRON) tablet 0.5 mg  0.5 mg Oral Q12HRS Alber Junior M.D.   0.5 mg at 12/15/21 0520   • [Held by provider] doxazosin (CARDURA) tablet 2 mg  2 mg Oral DAILY Alber Junior M.D.   2 mg at 12/15/21 0519   • [Held by provider] fludrocortisone (FLORINEF) tablet 0.1 mg  0.1 mg Oral BID Alber Junior M.D.   0.1 mg at 12/15/21 0518   • gabapentin (NEURONTIN) capsule 200 mg  200 mg Oral Q8HRS Alber Junior M.D.   200 mg at 12/15/21 1320   • [Held by provider] hydrocortisone (CORTEF) tablet 20 mg  20 mg Oral DAILY Alber Junior M.D.   20 mg at 12/15/21 0518    And   • [Held by provider] hydrocortisone (CORTEF) tablet 10 mg  10 mg Oral Nightly Alber Junior M.D.   10 mg at  12/14/21 1756   • levothyroxine (SYNTHROID) tablet 75 mcg  75 mcg Oral AM ES Alber Junior M.D.   75 mcg at 12/15/21 0519   • liothyronine (CYTOMEL) tablet 25 mcg  25 mcg Oral QAM AC Alber Junior M.D.   25 mcg at 12/15/21 0517   • [Held by provider] losartan (COZAAR) tablet 100 mg  100 mg Oral Q DAY Alber Junior M.D.   100 mg at 12/15/21 0519   • omeprazole (PRILOSEC) capsule 40 mg  40 mg Oral DAILY Alber Junior M.D.   40 mg at 12/15/21 0518   • senna-docusate (PERICOLACE or SENOKOT S) 8.6-50 MG per tablet 2 Tablet  2 Tablet Oral BID Alber Junior M.D.        And   • polyethylene glycol/lytes (MIRALAX) PACKET 1 Packet  1 Packet Oral QDAY PRN Alber Junior M.D.        And   • magnesium hydroxide (MILK OF MAGNESIA) suspension 30 mL  30 mL Oral QDAY PRN Alber Junior M.D.        And   • bisacodyl (DULCOLAX) suppository 10 mg  10 mg Rectal QDAY PRN Alber Junior M.D.       • acetaminophen (Tylenol) tablet 650 mg  650 mg Oral Q4HRS PRN Alber Junior M.D.       • ondansetron (ZOFRAN ODT) dispertab 4 mg  4 mg Oral Q4HRS PRN Alber Junior M.D.       • oxyCODONE immediate-release (ROXICODONE) tablet 5 mg  5 mg Oral Q4HRS PRN Alber Junior M.D.   5 mg at 12/14/21 2128    Or   • oxyCODONE immediate release (ROXICODONE) tablet 10 mg  10 mg Oral Q4HRS PRN Alber Junior M.D.   10 mg at 12/15/21 0848   • melatonin tablet 5 mg  5 mg Oral HS PRN - MR X 1 KONSTANTIN ReillyPVENKATESHNJelani   5 mg at 12/13/21 0150   • heparin infusion 25,000 units in 500 mL 0.45% NACL  0-30 Units/kg/hr (Adjusted) Intravenous Continuous Alber Junior M.D.   Stopped. (Insulin or Heparin) at 12/15/21 1017   • hydrALAZINE (APRESOLINE) injection 20 mg  20 mg Intravenous Q4HRS PRN Eligio Del Castillo M.D.   20 mg at 12/09/21 0115   • labetalol (NORMODYNE/TRANDATE) injection 10-20 mg  10-20 mg Intravenous Q4HRS PRN Eligio Del Castillo M.D.   20 mg at 12/11/21 0330   • MD Alert...Vancomycin per  Pharmacy   Other PHARMACY TO DOSE Eligio Del Castillo M.D.       • cefTRIAXone (Rocephin) 2 g in  mL IVPB  2 g Intravenous Q24HRS Eligio Del Castillo M.D.   Stopped at 12/15/21 0549   • Respiratory Therapy Consult   Nebulization Continuous RT Servando Rush M.D.       • MD Alert...ICU Electrolyte Replacement per Pharmacy   Other PHARMACY TO DOSE Servando Rush M.D.       • Pharmacy Consult Request ...Pain Management Review 1 Each  1 Each Other PHARMACY TO DOSE David Yoder M.D.       • ondansetron (ZOFRAN) syringe/vial injection 4 mg  4 mg Intravenous Q4HRS PRN David Yoder M.D.   4 mg at 12/11/21 2314   • promethazine (PHENERGAN) suppository 12.5-25 mg  12.5-25 mg Rectal Q4HRS PRN David Yoder M.D.           Fluids    Intake/Output Summary (Last 24 hours) at 12/15/2021 1600  Last data filed at 12/15/2021 0800  Gross per 24 hour   Intake 449.2 ml   Output 3000 ml   Net -2550.8 ml       Laboratory  Recent Labs     12/13/21 2052   ISTATAPH 7.532*   ISTATAPCO2 39.3*   ISTATAPO2 76   ISTATATCO2 34*   [id number] 96   ISTATARTHCO3 33.0*   ISTATARTBE 10*   ISTATTEMP 38.2 C   ISTATFIO2 100   ISTATSPEC Arterial   ISTATAPHTC 7.513*   [id number] 82         Recent Labs     12/13/21  0330 12/14/21  0316 12/15/21  0552   SODIUM 141 142 144   POTASSIUM 4.1 3.8 4.1   CHLORIDE 104 103 108   CO2 29 29 26   BUN 24* 25* 21   CREATININE 0.49* 0.53 0.43*   MAGNESIUM 1.7 1.6 1.6   PHOSPHORUS 2.7 2.4* 3.3   CALCIUM 8.5 8.6 8.4*     Recent Labs     12/13/21  0330 12/14/21  0316 12/15/21  0552   ALTSGPT 56* 50 43   ASTSGOT 36 33 30   ALKPHOSPHAT 167* 176* 165*   TBILIRUBIN 0.5 0.5 0.5   GLUCOSE 178* 170* 133*     Recent Labs     12/13/21  0330 12/13/21  0330 12/14/21  0316 12/14/21  0316 12/15/21  0552 12/15/21  0940 12/15/21  1330   WBC 16.7*   < > 15.9*   < > 15.3* 17.3* 16.9*   NEUTSPOLYS 84.70*   < > 80.20*   < > 82.70* 88.10* 92.00*   LYMPHOCYTES 5.80*   < > 8.40*   < > 7.70* 3.30* 2.50*   MONOCYTES 5.50    < > 6.20   < > 5.90 5.50 2.70   EOSINOPHILS 0.50   < > 0.70   < > 0.70 0.40 0.10   BASOPHILS 0.20   < > 0.10   < > 0.20 0.50 0.20   ASTSGOT 36  --  33  --  30  --   --    ALTSGPT 56*  --  50  --  43  --   --    ALKPHOSPHAT 167*  --  176*  --  165*  --   --    TBILIRUBIN 0.5  --  0.5  --  0.5  --   --     < > = values in this interval not displayed.     Recent Labs     12/13/21  0950 12/14/21  0316 12/15/21  0552 12/15/21  0940 12/15/21  1330   RBC  --    < > 3.22* 2.75* 3.06*   HEMOGLOBIN  --    < > 8.6* 7.4* 8.2*   HEMATOCRIT  --    < > 28.7* 25.0* 27.9*   PLATELETCT  --    < > 363 340 366   PROTHROMBTM 13.4  --   --   --   --    APTT 31.7  --   --   --   --    INR 1.05  --   --   --   --     < > = values in this interval not displayed.       Imaging  X-Ray:  I have personally reviewed the images and compared with prior images. and My impression is: Unchanged left lower lobe opacities    Assessment/Plan  * Cardiac arrest (HCC)  Assessment & Plan  S/p PEA, asystole and ventricular tachycardia in ED - precipitated by hemorrhagic shock  ROSC after 7 rounds of CPR, IV epinephrine, IV bicarbonate solution and massive transfusion protocol  ECHO with RV dysfunction and RVSP of 50 mmHg  Hemodynamics improved, weaned off pressors  Neurologically improved, extubated 12/12, diffusely weak  Lidocaine patch for chest wall pain has helped    Pulmonary hypertension (HCC)  Assessment & Plan  RVSP 50  Monitor for ROSSI  Elevated nicol-arrest?  F/u ECHO and possibly PSG as an outpatient  Forced diuresis as needed    Atelectasis  Assessment & Plan  Multifactorial-status post arrest, prolonged course of ventilator, weak and not moving much, obese with episode of pancreatitis  Monitor for the need for therapeutic bronchoscopy, active TX FOB 12/8, follow-up TX FOB with extubation 12/12 with minimal plugs  Lysed to chair twice daily  Up for meals, patient and RN encouraged strongly  I-S, may need IPPV-reviewed with RT  RT protocols  Serial  chest x-ray as needed    Hypokalemia  Assessment & Plan  Replete potassium, ERP    Paroxysmal atrial fibrillation (HCC)  Assessment & Plan  Rate controlled, currently in SR  Continue carvedilol, 25 mg twice daily  Optimize potassium and magnesium  [id number] score high, 10% risk CVA, will need anticoagulation when/if becomes clinically appropriate  Initiatd heparin drip without bolus 12/13    Hypocalcemia  Assessment & Plan  Replete calcium, ERP    Hypomagnesemia  Assessment & Plan  Replete magnesium, ERP    Hemoperitoneum  Assessment & Plan  Surgery has evaluated  No indication for surgery at this time  Trend hemoglobin and transfuse PRBCs to keep hemoglobin greater than 7-no change in greater than 1 week  Unchanged hemoglobin  Status post IVC filter 12/6  Heparin PPx reinitiated 12/10, no bleeding, restarted heparin drip no bolus for PE/PAF 12/13  HGB unchanged, no clinical signs of bleeding-continue to monitor  Reverse with protamine or FFC as needed, hold as needed    Gastrointestinal hemorrhage  Assessment & Plan  History of gastrointestinal hemorrhage with no source identified-no recurrence/monitoring  Possible gastrointestinal hemorrhage provoked by IV heparin for pulmonary embolism - no gross blood noted in stool - positive for occult blood  Continue PPI twice daily  GI consult noted, if rebleeds f/u GI consultation  Trend hemoglobin and transfuse PRBCs to keep hemoglobin greater than 7  Heparin subcu PPx restarted 12/10, trial IV heparin no bolus starting 12/13, consider enteral agent if no bleeding after several days  Status post IVC filter off anticoagulation for A. Fib  HGB unchanged, no clinical signs of bleeding-continue to monitor  Reverse with protamine or FFC as needed, hold s needed    DVT (deep venous thrombosis) (HCC)  Assessment & Plan  Imaging reveals LUE DVT associated with her PICC line and a distal LLE DVT  Anticoagulation strictly contraindicated due to acute gastrointestinal hemorrhage  and hemoperitoneum  IVC filter placement on 12/6  Also needs anticoagulation from a PE & PAF [id number] 2 risk score - 10%  Resuming IV heparin without bolus 12/13    Shock (HCC)- (present on admission)  Assessment & Plan  Hemorrhagic shock with acute gastrointestinal blood loss and vasodistributive shock - 12/2 off pressors 12/10  Shock has resolved, weaned off norepinephrine  Hemoglobin grossly unchanged monitoring for hypotension/bleeding since resuming IV heparin    Recurrent shock 12/15-suspect hypovolemia, multiple antihypertensives and IV narcotics  Responded to Nathan-Synephrine pops and norepinephrine infusion along with 1 L saline bolus  Patient -2.1 51L/24 hours, -10+ liters since 12/10  Holding losartan, Cardura and carvedilol  DC IV Dilaudid for now  Weaning off norepinephrine now 20 -> 2  Stress dose steroids given in this addisonian patient, wean tomorrow?    Acute pancreatitis  Assessment & Plan  Resolved likely  She is having bowel movements  She is tolerating enteral tube feedings  She denies N/V/abdominal pain  Continue to monitor    PE (pulmonary thromboembolism) (HCC)- (present on admission)  Assessment & Plan  Anticoagulation was contraindicated initially  IVC filter placement on 12/6  No change in hemoglobin or significant GI bleeding since heparin stopped, greater than 1 week  No issues with subcu heparin PPx and platelet count has normalized  DC subcu heparin PPx and start heparin without bolus by drip 12/13  Consider transitioning to enteral agent if tolerates IV heparin over the next 72+ hours  Monitoring for bleeding  Heparin 12/15 for hypotensive episode, no bleeding clinically, first hemoglobin trending down but stable thereafter, heparin not reversed      Debilitated patient  Assessment & Plan  Very complex medical patient now post arrest and 10-11-day ventilator course with severe diffuse weakness needing Neeru lift to transfer to chair  PT/OT to eval and treat  Speech therapy consult,  swallow evaluation  Appears to be doing cognitively okay, monitor for need for cognitive eval  Rehab versus LTAC  Physiatry consultation noted  Not a candidate for SNU given her complex medical scenario and ongoing treatments as she significantly improves    Acute blood loss anemia- (present on admission)  Assessment & Plan  Suspect gastrointestinal source of blood loss as well as acute hemoperitoneum-no recurrence clinically-monitoring  10 you to trend hemoglobin and transfuse PRBCs to keep hemoglobin greater than 7  Likely no recurrence, hemoglobin unchanged for approximately >7 days  That is post 3 days heparin SQ PPx-> initiate IV heparin without bolus  Start trial anticoagulation no bleeding on heparin for DVT/PE/PAF    Thrombocytopenia (HCC)- (present on admission)  Assessment & Plan  Trend platelet count and thromboelastogram with platelet mapping as needed  Transfuse platelets as required  Count normalized 12/9-remains normal  Unchanged since restarting heparin    Hemochromatosis- (present on admission)  Assessment & Plan  History of    Adrenal insufficiency (Price's disease) (HCC)- (present on admission)  Assessment & Plan  Continue hydrocortisone to 20 mg twice daily  Continue Florinef, 0.1 mg twice daily  Monitor for the need to bump hydrocortisone up with stress  Monitoring hemodynamics/electrolytes closely    Elevated LFTs- (present on admission)  Assessment & Plan  Due to ischemic hepatopathy from cardiac arrest and shock  Trend enzymes and synthetic function  Avoid hepatotoxins  Mildly trending up-continue to monitor    MRSA and E. coli sinusitis  Assessment & Plan  On outpatient IV antibiotics - LUE PICC in place  Continue vancomycin and ceftriaxone-6-week course of antibiotics planned per ID-to complete 12/21 per pharmacy  monitor for secondary infections and antibiotic complications    Acute kidney injury (HCC)- (present on admission)  Assessment & Plan  Continues to slowly improve, creatinine  normalized and BUN still coming down  Suspect ATN due to shock in lady with underlying atrophic kidneys  Monitor renal function and urine output  Avoid nephrotoxins and renal dose medications  Serial BMP    Primary hypertension- (present on admission)  Assessment & Plan  Goal SBP less than 160  Hydralazine and labetalol as necessary to achieve blood pressure goals-if frequently required adjust enteral regimen  Hold amiloride, 5 mg daily  Hold doxazosin, 2 mg daily  Hold carvedilol, 25 mg twice daily  Hold losartan 100 mg  Reassess daily->if significant bleeding occurs with heparin reduce regimen as clinically appropriate    Hypothyroidism- (present on admission)  Assessment & Plan  Continue levothyroxine and liothyronine  Repeat TSH in 4 weeks, sooner if significant arrhythmias occur without obvious etiology  Most recent TSH borderline low    Obesity (BMI 35.0-39.9 without comorbidity)- (present on admission)  Assessment & Plan  Behavioral modification and weight loss encouraged  Hypothyroid on repletion therapy  TSH borderline low  Monitor for ROSSI - PAP as needed    Acute respiratory failure with hypoxia (HCC)- (present on admission)  Assessment & Plan  Intubated 12/2-extubated 12/12  RT protocols  Mobility level 2-3  Aggressive pulmonary toilet, repeat TX FOB as needed  Forced diuresis ongoing - dose with diamox?  Serial chest x-ray as needed  BiPAP for support post extubation as needed  Alternate with HFNC  Heparin for PE when clinically safe     VTE:  Contraindicated  Ulcer: PPI  Lines: Central Line  Ongoing indication addressed and Perez Catheter  Ongoing indication addressed     I have performed a physical exam and reviewed and updated ROS and Plan today (12/15/2021). In review of yesterday's note (12/14/2021), there are no changes except as documented above.     Discussed patient condition and risk of morbidity and/or mortality with Family, RN, RT, Pharmacy, , Charge nurse / hot rounds and Patient      The patient remains critically ill.  Actively titrating norepinephrine for hypotension.  Patient requiring BiPAP for hypoxemia and hypoventilation.  Alternating with HFNC.  Monitoring for the need to re-intubate.    Critical care time = 80 minutes in directly providing and coordinating critical care and extensive data review.  No time overlap and excludes procedures.

## 2021-12-15 NOTE — CARE PLAN
Problem: Hyperinflation  Goal: Prevent or improve atelectasis  Description: 1. Instruct incentive spirometry usage  2.  Perform hyperinflation therapy as indicated  Flowsheets (Taken 12/14/2021 1010)  Hyperinflation Protocol Goals/Outcome: Improvement in Repeat CXR  Hyperinflation Protocol Indications: Atelectasis Documented by Chest X-Ray     Problem: Humidified High Flow Nasal Cannula  Goal: Maintain adequate oxygenation dependent on patient condition  Description: 1.  Implement humidified high flow oxygen therapy  2.  Titrate high flow oxygen to maintain appropriate SpO2  Flowsheets  Taken 12/14/2021 1736 by Jimmy Malloy, RRT  Indication: All other patients: SpO2 less than 90% despite conventional supplemental oxygen devices  Taken 12/14/2021 1600 by Raphael uDgan RMATI.  O2 (LPM): 30  FiO2%: 40 %

## 2021-12-15 NOTE — PROGRESS NOTES
Pharmacy Vancomycin Kinetics Note for 12/15/2021     56 y.o. female on Vancomycin day # 14     Vancomycin Indication (Two level/Trough based Dosing): Skin/skin structure infection (goal trough 10-15)    Provider specified end date: 12/21/21    Active Antibiotics (From admission, onward)    Ordered     Ordering Provider       Wed Dec 15, 2021  9:00 AM    12/15/21 0900  vancomycin (VANCOCIN) 1,250 mg in  mL IVPB  (vancomycin (VANCOCIN) IV (LD + Maintenance))  EVERY EVENING         Alber Junior M.D.       Fri Dec 3, 2021  4:25 PM    12/03/21 1625  cefTRIAXone (Rocephin) 2 g in  mL IVPB  EVERY 24 HOURS         Eligio Del Castillo M.D.       Fri Dec 3, 2021  9:08 AM    12/03/21 0908  MD Alert...Vancomycin per Pharmacy  PHARMACY TO DOSE        Question:  Indication(s) for vancomycin?  Answer:  Other (comments)    Eligio Del Castillo M.D.          Dosing Weight: 100 kg (220 lb 7.4 oz)      Admission History: Admitted on 12/1/2021 for PE (pulmonary thromboembolism) ([facility]) [I26.99]  Shock (HCC) [R57.9]  Pertinent history: Patient with history of sinusitis followed by [facility] and has been on multiple courses of antibiotics.  Most recent culture with E coli and MRSA.  Vancomycin and ceftriaxone initiated.    Allergies:     Ancef [cefazolin], Bactrim [sulfamethoxazole w-trimethoprim], Bee venom, Buprenorphine, Clindamycin, Contrast media with iodine [iodine], Doxycycline, Econazole, Flagyl  [metronidazole], Floxin [ofloxacin], Gadolinium derivatives, Hydrocodone-acetaminophen, Iodine, Keflex, Levofloxacin, Morphine, Naloxone, Nitrofurantoin, Nyquil, Paricalcitol, Penicillins, Tape, Tramadol, Vicks dayquil cold, Azithromycin, Bextra [valdecoxib], Linezolid, Adhesive remover [skin adhesives], Codeine, Sulfa drugs, and Tygacil [tigecycline]     Pertinent cultures to date:     Results     Procedure Component Value Units Date/Time    AFB Culture - BAL [[id number]] Collected: 12/08/21 1015    Order Status: Completed  Specimen: Respirate from Bronchoalveolar Lavage Updated: 12/10/21 2134     AFB Smear Results SEE NOTE     Comment: Negative for Acid Fast Bacteria.  Less than 5 mL of specimen received.  A minimum of 5 mL of sputum or fluid is recommended  for recovery of acid fast bacilli (AFB).  Volumes less than 5 mL are suboptimal and may  compromise recovery of AFB from culture.  Performed by [employer],  [address] [facility], [facility],[place] [address] [phone]  [email or web address], Irene Weems MD - Lab. Director          Preliminary Report SEE NOTE     Comment: Specimen received and in progress.  Positive culture results are called as soon as detected.  Final report to follow in seven to eight weeks  Performed by [employer],  [address] [facility], [facility] [address] [phone]  [email or web address], Irene Weems MD - Lab. Director         Narrative:      Collected By: [id number] ASHLEY DEJESUS  BAL from LLL  Collected By: [id number] ASHLEY DEJESUS  Which Lobe (Bronch Only):->LLL    Quant Bronchial Washing [[id number]] Collected: 12/08/21 1015    Order Status: Completed Specimen: Respirate from Bronchoalveolar Lavage Updated: 12/10/21 1356     Significant Indicator NEG     Source RESP     Site BRONCHOALVEOLAR LAVAGE     Culture Result 13,500 cfu/mL usual upper respiratory derick  No clinically significant Staphylococcus aureus, Methicillin  Resistant Staphylococcus aureus, or Pseudomonas species  isolated.       Gram Stain Result Moderate WBCs.  Rare Yeast.      Narrative:      Collected By: [id number] ASHLEY DEJESUS  BAL from LLL  Collected By: [id number] ASHLEY DEJESUS  Which Lobe (Bronch Only):->LLL  Collected By: [id number] ASHLEY DEJESUS    Fungal Culture - BAL [[id number]] Collected: 12/08/21 1015    Order Status: Completed Specimen: Respirate from Bronchoalveolar Lavage Updated: 12/10/21 1356     Significant Indicator NEG     Source RESP     Site BRONCHOALVEOLAR LAVAGE     Culture Result Culture in progress.     "Narrative:      Collected By: [id number] SOLITARIOAMBER DEJESUS  BAL from LLL  Collected By: [id number] SOLITARIOAMBER DEJESUS  Which Lobe (Bronch Only):->LLL  Collected By: [id number] SOLITARIOAMBER DEJESUS    Fungal Smear - BAL [[id number]] Collected: 12/08/21 1015    Order Status: Completed Specimen: Respirate from Bronchoalveolar Lavage Updated: 12/10/21 1356     Significant Indicator NEG     Source RESP     Site BRONCHOALVEOLAR LAVAGE     Fungal Smear Results Rare yeast seen.    Narrative:      Collected By: [id number] SOLITARIOAMBER DEJESUS  BAL from LLL  Collected By: [id number] SOLITARIOAMBER DEJESUS  Which Lobe (Bronch Only):->LLL  Collected By: [id number] SOLITARIOAMBER DEJESUS    GRAM STAIN [[id number]] Collected: 12/08/21 1015    Order Status: Completed Specimen: Respirate Updated: 12/09/21 0641     Significant Indicator .     Source RESP     Site BRONCHOALVEOLAR LAVAGE     Gram Stain Result Moderate WBCs.  Rare Yeast.      Narrative:      Collected By: [id number] SOLITARIOAMBER DEJESUS  BAL from LLL  Collected By: [id number] SOLITARIOAMBER DEJESUS  Which Lobe (Bronch Only):->LLL  Collected By: [id number] SOLITARIOAMBER DEJESUS    Culture Respiratory W/ Grm Stn - BAL [[id number]] Collected: 12/08/21 1015    Order Status: Canceled Specimen: Other from Bronchoalveolar Lavage     BLOOD CULTURE [[id number]] Collected: 12/01/21 2320    Order Status: Completed Specimen: Blood from Peripheral Updated: 12/07/21 0100     Significant Indicator NEG     Source BLD     Site PERIPHERAL     Culture Result No growth after 5 days of incubation.    Narrative:      2 of 2 blood culture x2  Sites order. Per Hospital Policy:  Only change Specimen Src: to \"Line\" if specified by physician  order.  Right AC    BLOOD CULTURE [[id number]] Collected: 12/01/21 1345    Order Status: Completed Specimen: Blood from Peripheral Updated: 12/06/21 1500     Significant Indicator NEG     Source BLD     Site PERIPHERAL     Culture Result No growth after 5 days of incubation.    Narrative:   " "   1 of 2 for Blood Culture x 2 sites order. Per Hospital  Policy: Only change Specimen Src: to \"Line\" if specified by  physician order.  No site indicated    URINALYSIS CULTURE, IF INDICATED [[id number]]  (Abnormal) Collected: 21    Order Status: Completed Specimen: Urine Updated: 21     Color DK Yellow     Character Cloudy     Specific Gravity 1.030     Ph 5.0     Glucose Negative mg/dL      Ketones Negative mg/dL      Protein 30 mg/dL      Bilirubin Negative     Urobilinogen, Urine 0.2     Nitrite Negative     Leukocyte Esterase Trace     Occult Blood Trace     Micro Urine Req Microscopic    Narrative:      Indication for culture:->Patient WITHOUT an indwelling Perez  catheter in place with new onset of Dysuria, Frequency,  Urgency, and/or Suprapubic pain    URINALYSIS [[id number]]     Order Status: Canceled Specimen: Urine           Labs:     Estimated Creatinine Clearance: 165.9 mL/min (A) (by C-G formula based on SCr of 0.43 mg/dL (L)).  Recent Labs     21  03321  0316 12/15/21  0552   WBC 16.7* 15.9* 15.3*   NEUTSPOLYS 84.70* 80.20* 82.70*     Recent Labs     21  0330 21  0316 12/15/21  0552   BUN 24* 25* 21   CREATININE 0.49* 0.53 0.43*   ALBUMIN 2.3* 2.6* 2.6*       Intake/Output Summary (Last 24 hours) at 12/15/2021 0900  Last data filed at 12/15/2021 0800  Gross per 24 hour   Intake 449.2 ml   Output 3000 ml   Net -2550.8 ml      /85   Pulse 98   Temp 36.9 °C (98.4 °F) (Temporal)   Resp (!) 22   Ht 1.626 m (5' 4.02\")   Wt 100 kg (220 lb 7.4 oz)   SpO2 94%  Temp (24hrs), Av.1 °C (98.8 °F), Min:36.9 °C (98.4 °F), Max:37.2 °C (99 °F)      List concerns for Vancomycin clearance:     BUN/Scr ratio greater than 20:1;Obesity    Pharmacokinetics:     Two level kinetics:   Ke (hr ^-1): 0.0438  Half life: 15.8  Vd: Steady state Vd : 60.142  Calculated AUC: 569 mg·hr/L    Trough kinetics:   Recent Labs     21  1712   EFRAIN  --  " 14.3   MARIELOS 32.3  --        A/P:     -  Vancomycin dose: 1250 mg IV every 24 hours    -  Next vancomycin level(s): no further levels necessary unless change occurs in anticipated duration, renal function, or clinical status    -  Predicted vancomycin AUC from two level test calculator: 474 mg·hr/L    -  Comments: Peak and trough levels obtained, dose adjusted accordingly.  1 week left in anticipated therapy, no further levels indicated.      Meka Lanza, PharmD, BCCCP

## 2021-12-15 NOTE — DIETARY
Nutrition Services: Update    Admit day 14.  Pt on TF 12/4-12/14. Pt removed Cortrak 12/14.  SLP consulted and deemed pt safe for modified PO diet, starting yesterday (12/14) @ lunch. No meals recorded yet in ADLs.  Reviewed weight trends.  RD will monitor PO intake/needs.

## 2021-12-15 NOTE — CARE PLAN
Problem: Nutritional:  Goal: Achieve adequate nutritional intake  Description: Patient will consume >50% of meals.   Outcome: Not Met

## 2021-12-15 NOTE — THERAPY
Speech Language Pathology  Daily Treatment     Patient Name: Donna Isaac  Age:  56 y.o., Sex:  female  Medical Record #: [id number]  Today's Date: 12/15/2021     Precautions: Fall Risk,Swallow Precautions ( See Comments)  Comments: cardiac arrest in hospital    Assessment    Patient was seen on this date for dysphagia treatment s/p initiation of a modified diet on 12/14. Pt sitting out of bed in a chair, on 50L/80% FiO2 via HFNC. Patient was AAOx4 but with waxing and waning CARL throughout session as seen by pt closing eyes and slumping over and becoming unresponsive for brief moments at a time. Voice was clear but reduced in intensity.    PO was minimal and consisted of 2-3 oz MTL and 1 bite soft solids during periods of alertness. Pt able to bring cup to mouth but required hand-over-hand pacing to take small single sips versus larger consecutive sips. SLP assisted in delivering single bite of soft solids. Onset of swallow trigger was timely and no overt s/sx of aspiration appreciated with all trials tested. Mastication slightly prolonged suspect from fatigue, no oral residue noted. MD at bedside and pt reported not feeling well and wishing not to continue PO. Therefore, session was concluded.     Clinical Impression  Patient is presenting with clinical signs of oropharyngeal dysphagia suspect acute r/t prolonged intubation, high O2 needs, generalized weakness, and reduced CARL. A short term modified diet is indicated. SLP following closely and will consider diagnostic swallow study with any ongoing concerns for aspiration.    Recommendations  1. Continue soft and bite size/mildly thick liquids with adherence to the following:  -upright at 90* for PO or out of bed in a chair   -1:1 feeding   -small bites/sips   -no straws    -slow rate  2. Meds floated in puree  3. Okay for 5-10 single ice chips per hour with nursing or trained family, DO NOT LEAVE AT BEDSIDE     Plan    Continue current treatment  plan.    Discharge Recommendations: Recommend post-acute placement for additional speech therapy services prior to discharge home     Objective       12/15/21 0902   Vitals   O2 (LPM) 50   O2 Delivery Device Heated High Flow Nasal Cannula   Dysphagia    Positioning / Behavior Modification Modulate Rate or Bite Size   Diet / Liquid Recommendation Mildly Thick (2) - (Nectar Thick);Soft & Bite-Sized (6) - (Dysphagia III)   Nutritional Liquid Intake Rating Scale Thickened beverages (mildly thick unless otherwise specified)   Skilled Intervention Compensatory Strategies;Verbal Cueing   Recommended Route of Medication Administration   Medication Administration  Float Whole with Puree   Short Term Goals   Short Term Goal # 1 Pt will consume a diet of SB6/MT2 with no s/sx of aspiration and min cues.   Goal Outcome # 1 Progressing slower than expected

## 2021-12-15 NOTE — DISCHARGE PLANNING
[facility] Transitional [facility] Coordination    Anticipate Physiatry consult.  50L oxygen via high flow nasal cannula.  TCC following.

## 2021-12-16 LAB
ALBUMIN SERPL BCP-MCNC: 2.6 G/DL (ref 3.2–4.9)
ALBUMIN/GLOB SERPL: 0.8 G/DL
ALP SERPL-CCNC: 162 U/L (ref 30–99)
ALT SERPL-CCNC: 43 U/L (ref 2–50)
ANION GAP SERPL CALC-SCNC: 12 MMOL/L (ref 7–16)
APTT PPP: 26 SEC (ref 24.7–36)
AST SERPL-CCNC: 22 U/L (ref 12–45)
BASOPHILS # BLD AUTO: 0.4 % (ref 0–1.8)
BASOPHILS # BLD: 0.06 K/UL (ref 0–0.12)
BILIRUB SERPL-MCNC: 0.4 MG/DL (ref 0.1–1.5)
BUN SERPL-MCNC: 16 MG/DL (ref 8–22)
CALCIUM SERPL-MCNC: 8.5 MG/DL (ref 8.5–10.5)
CHLORIDE SERPL-SCNC: 109 MMOL/L (ref 96–112)
CO2 SERPL-SCNC: 24 MMOL/L (ref 20–33)
CREAT SERPL-MCNC: 0.44 MG/DL (ref 0.5–1.4)
EOSINOPHIL # BLD AUTO: 0 K/UL (ref 0–0.51)
EOSINOPHIL NFR BLD: 0 % (ref 0–6.9)
ERYTHROCYTE [DISTWIDTH] IN BLOOD BY AUTOMATED COUNT: 73.5 FL (ref 35.9–50)
GLOBULIN SER CALC-MCNC: 3.2 G/DL (ref 1.9–3.5)
GLUCOSE BLD-MCNC: 164 MG/DL (ref 65–99)
GLUCOSE BLD-MCNC: 165 MG/DL (ref 65–99)
GLUCOSE BLD-MCNC: 185 MG/DL (ref 65–99)
GLUCOSE SERPL-MCNC: 174 MG/DL (ref 65–99)
HCT VFR BLD AUTO: 27.6 % (ref 37–47)
HGB BLD-MCNC: 8.2 G/DL (ref 12–16)
IMM GRANULOCYTES # BLD AUTO: 0.37 K/UL (ref 0–0.11)
IMM GRANULOCYTES NFR BLD AUTO: 2.2 % (ref 0–0.9)
INR PPP: 1.1 (ref 0.87–1.13)
LYMPHOCYTES # BLD AUTO: 0.8 K/UL (ref 1–4.8)
LYMPHOCYTES NFR BLD: 4.8 % (ref 22–41)
MAGNESIUM SERPL-MCNC: 1.8 MG/DL (ref 1.5–2.5)
MCH RBC QN AUTO: 27 PG (ref 27–33)
MCHC RBC AUTO-ENTMCNC: 29.7 G/DL (ref 33.6–35)
MCV RBC AUTO: 90.8 FL (ref 81.4–97.8)
MONOCYTES # BLD AUTO: 0.94 K/UL (ref 0–0.85)
MONOCYTES NFR BLD AUTO: 5.7 % (ref 0–13.4)
NEUTROPHILS # BLD AUTO: 14.41 K/UL (ref 2–7.15)
NEUTROPHILS NFR BLD: 86.9 % (ref 44–72)
NRBC # BLD AUTO: 0.06 K/UL
NRBC BLD-RTO: 0.4 /100 WBC
PHOSPHATE SERPL-MCNC: 3.4 MG/DL (ref 2.5–4.5)
PLATELET # BLD AUTO: 382 K/UL (ref 164–446)
PMV BLD AUTO: 11.6 FL (ref 9–12.9)
POTASSIUM SERPL-SCNC: 4.1 MMOL/L (ref 3.6–5.5)
PROT SERPL-MCNC: 5.8 G/DL (ref 6–8.2)
PROTHROMBIN TIME: 13.9 SEC (ref 12–14.6)
RBC # BLD AUTO: 3.04 M/UL (ref 4.2–5.4)
SODIUM SERPL-SCNC: 145 MMOL/L (ref 135–145)
UFH PPP CHRO-ACNC: 0.15 IU/ML
UFH PPP CHRO-ACNC: <0.1 IU/ML
UFH PPP CHRO-ACNC: <0.1 IU/ML
WBC # BLD AUTO: 16.6 K/UL (ref 4.8–10.8)

## 2021-12-16 PROCEDURE — 700105 HCHG RX REV CODE 258: Performed by: INTERNAL MEDICINE

## 2021-12-16 PROCEDURE — 80053 COMPREHEN METABOLIC PANEL: CPT

## 2021-12-16 PROCEDURE — 92526 ORAL FUNCTION THERAPY: CPT

## 2021-12-16 PROCEDURE — A9270 NON-COVERED ITEM OR SERVICE: HCPCS | Performed by: INTERNAL MEDICINE

## 2021-12-16 PROCEDURE — A9270 NON-COVERED ITEM OR SERVICE: HCPCS | Performed by: NURSE PRACTITIONER

## 2021-12-16 PROCEDURE — 700101 HCHG RX REV CODE 250: Performed by: INTERNAL MEDICINE

## 2021-12-16 PROCEDURE — 94669 MECHANICAL CHEST WALL OSCILL: CPT

## 2021-12-16 PROCEDURE — 700111 HCHG RX REV CODE 636 W/ 250 OVERRIDE (IP): Performed by: INTERNAL MEDICINE

## 2021-12-16 PROCEDURE — 82962 GLUCOSE BLOOD TEST: CPT | Mod: 91

## 2021-12-16 PROCEDURE — 85520 HEPARIN ASSAY: CPT

## 2021-12-16 PROCEDURE — 85610 PROTHROMBIN TIME: CPT

## 2021-12-16 PROCEDURE — 99291 CRITICAL CARE FIRST HOUR: CPT | Performed by: INTERNAL MEDICINE

## 2021-12-16 PROCEDURE — 770022 HCHG ROOM/CARE - ICU (200)

## 2021-12-16 PROCEDURE — 700102 HCHG RX REV CODE 250 W/ 637 OVERRIDE(OP): Performed by: INTERNAL MEDICINE

## 2021-12-16 PROCEDURE — 94640 AIRWAY INHALATION TREATMENT: CPT

## 2021-12-16 PROCEDURE — 84100 ASSAY OF PHOSPHORUS: CPT

## 2021-12-16 PROCEDURE — 700102 HCHG RX REV CODE 250 W/ 637 OVERRIDE(OP): Performed by: NURSE PRACTITIONER

## 2021-12-16 PROCEDURE — 85025 COMPLETE CBC W/AUTO DIFF WBC: CPT

## 2021-12-16 PROCEDURE — 83735 ASSAY OF MAGNESIUM: CPT

## 2021-12-16 PROCEDURE — 85730 THROMBOPLASTIN TIME PARTIAL: CPT

## 2021-12-16 RX ORDER — LOSARTAN POTASSIUM 50 MG/1
100 TABLET ORAL
Status: DISCONTINUED | OUTPATIENT
Start: 2021-12-16 | End: 2021-12-23

## 2021-12-16 RX ORDER — CARVEDILOL 12.5 MG/1
12.5 TABLET ORAL EVERY MORNING
Status: DISCONTINUED | OUTPATIENT
Start: 2021-12-16 | End: 2021-12-17

## 2021-12-16 RX ORDER — HEPARIN SODIUM 5000 [USP'U]/100ML
0-30 INJECTION, SOLUTION INTRAVENOUS CONTINUOUS
Status: DISCONTINUED | OUTPATIENT
Start: 2021-12-16 | End: 2021-12-18

## 2021-12-16 RX ORDER — MAGNESIUM SULFATE HEPTAHYDRATE 40 MG/ML
2 INJECTION, SOLUTION INTRAVENOUS ONCE
Status: COMPLETED | OUTPATIENT
Start: 2021-12-16 | End: 2021-12-16

## 2021-12-16 RX ORDER — FUROSEMIDE 20 MG/1
20 TABLET ORAL
Status: DISCONTINUED | OUTPATIENT
Start: 2021-12-16 | End: 2021-12-23

## 2021-12-16 RX ADMIN — OXYCODONE HYDROCHLORIDE 10 MG: 10 TABLET ORAL at 05:17

## 2021-12-16 RX ADMIN — GABAPENTIN 200 MG: 100 CAPSULE ORAL at 14:59

## 2021-12-16 RX ADMIN — INSULIN HUMAN 3 UNITS: 100 INJECTION, SOLUTION PARENTERAL at 07:39

## 2021-12-16 RX ADMIN — HYDROCORTISONE 10 MG: 20 TABLET ORAL at 17:13

## 2021-12-16 RX ADMIN — HYDROCORTISONE SODIUM SUCCINATE 25 MG: 100 INJECTION, POWDER, FOR SOLUTION INTRAMUSCULAR; INTRAVENOUS at 12:04

## 2021-12-16 RX ADMIN — Medication 5 MG: at 01:15

## 2021-12-16 RX ADMIN — GABAPENTIN 200 MG: 100 CAPSULE ORAL at 22:37

## 2021-12-16 RX ADMIN — INSULIN HUMAN 3 UNITS: 100 INJECTION, SOLUTION PARENTERAL at 12:07

## 2021-12-16 RX ADMIN — MAGNESIUM SULFATE HEPTAHYDRATE 2 G: 2 INJECTION, SOLUTION INTRAVENOUS at 07:24

## 2021-12-16 RX ADMIN — LIOTHYRONINE SODIUM 25 MCG: 25 TABLET ORAL at 07:26

## 2021-12-16 RX ADMIN — HEPARIN SODIUM 12 UNITS/KG/HR: 5000 INJECTION, SOLUTION INTRAVENOUS at 09:27

## 2021-12-16 RX ADMIN — LEVOTHYROXINE SODIUM 75 MCG: 0.07 TABLET ORAL at 05:16

## 2021-12-16 RX ADMIN — CARVEDILOL 12.5 MG: 12.5 TABLET, FILM COATED ORAL at 08:59

## 2021-12-16 RX ADMIN — HYDROCORTISONE SODIUM SUCCINATE 50 MG: 100 INJECTION, POWDER, FOR SOLUTION INTRAMUSCULAR; INTRAVENOUS at 05:17

## 2021-12-16 RX ADMIN — HYDROCORTISONE SODIUM SUCCINATE 25 MG: 100 INJECTION, POWDER, FOR SOLUTION INTRAMUSCULAR; INTRAVENOUS at 17:11

## 2021-12-16 RX ADMIN — VANCOMYCIN HYDROCHLORIDE 1250 MG: 5 INJECTION, POWDER, LYOPHILIZED, FOR SOLUTION INTRAVENOUS at 17:11

## 2021-12-16 RX ADMIN — GABAPENTIN 200 MG: 100 CAPSULE ORAL at 05:16

## 2021-12-16 RX ADMIN — CEFTRIAXONE SODIUM 2 G: 2 INJECTION, POWDER, FOR SOLUTION INTRAMUSCULAR; INTRAVENOUS at 05:17

## 2021-12-16 RX ADMIN — OXYCODONE HYDROCHLORIDE 10 MG: 10 TABLET ORAL at 14:59

## 2021-12-16 RX ADMIN — FUROSEMIDE 20 MG: 20 TABLET ORAL at 12:10

## 2021-12-16 RX ADMIN — INSULIN HUMAN 3 UNITS: 100 INJECTION, SOLUTION PARENTERAL at 21:52

## 2021-12-16 RX ADMIN — OMEPRAZOLE 40 MG: 20 CAPSULE, DELAYED RELEASE ORAL at 05:16

## 2021-12-16 RX ADMIN — FLUDROCORTISONE ACETATE 0.1 MG: 0.1 TABLET ORAL at 17:13

## 2021-12-16 RX ADMIN — LIDOCAINE 1 PATCH: 50 PATCH TOPICAL at 08:59

## 2021-12-16 RX ADMIN — DEXAMETHASONE 0.5 MG: 1 TABLET ORAL at 17:21

## 2021-12-16 RX ADMIN — INSULIN HUMAN 5 UNITS: 100 INJECTION, SUSPENSION SUBCUTANEOUS at 18:33

## 2021-12-16 RX ADMIN — LOSARTAN POTASSIUM 100 MG: 50 TABLET, FILM COATED ORAL at 12:10

## 2021-12-16 RX ADMIN — OXYCODONE HYDROCHLORIDE 10 MG: 10 TABLET ORAL at 00:12

## 2021-12-16 RX ADMIN — INSULIN HUMAN 3 UNITS: 100 INJECTION, SOLUTION PARENTERAL at 18:33

## 2021-12-16 ASSESSMENT — ENCOUNTER SYMPTOMS
SPUTUM PRODUCTION: 0
HEMOPTYSIS: 0
EYES NEGATIVE: 1
SHORTNESS OF BREATH: 0
FLANK PAIN: 0
BLOOD IN STOOL: 0
COUGH: 0
WEAKNESS: 1
NAUSEA: 0
PALPITATIONS: 0
VOMITING: 0
FOCAL WEAKNESS: 0
DIZZINESS: 0
ABDOMINAL PAIN: 0

## 2021-12-16 ASSESSMENT — PAIN DESCRIPTION - PAIN TYPE
TYPE: CHRONIC PAIN
TYPE: ACUTE PAIN

## 2021-12-16 NOTE — THERAPY
Speech Language Pathology  Daily Treatment     Patient Name: Donna Isaac  Age:  56 y.o., Sex:  female  Medical Record #: [id number]  Today's Date: 12/16/2021     Precautions  Precautions: Fall Risk,Swallow Precautions ( See Comments)  Comments: cardiac arrest in hospital    Assessment    Patient was seen for dysphagia tx with focus on tx trials of ice chips, soft/bite sized, regular solids and thin water via tsp/cup.  Pt was A&Ox4, pleasant and cooperative. Pt was repositioned upright and oral care was performed. Pt was able to feed self solids and cup sips of thins; SLP provided ice chips and tsps of water. Mastication, bolus formation and A-P lingual transit were functional. Throat clear x1 occurred with 1/5 tsps of thins and x1 with regular solids. Otherwise, no s/sx of aspiration, vocal changes or changes to respiratory status occurred w/ PO intake. Pt appears appropriate for upgrade to thins.     Recommendations:  1. Upgrade diet to Soft & Bite Sized solids (SB6) and Thin Liquids (TN0)    -Direct meal supervision   -Float pills in puree   -Sit upright for all PO intake, small bites/sips, slow rate  2. Oral care after meals and mobilize w/ staff as medically feasible per PT/OT recs to mitigate risk of aspiration PNA.    Plan    Treatment plan modified to 4 times per week until therapy goals are met for the following treatments:  Dysphagia Training and Patient / Family / Caregiver Education.    Discharge Recommendations: Recommend post-acute placement for additional speech therapy services prior to discharge home       Objective       12/16/21 1158   Dysphagia    Positioning / Behavior Modification Modulate Rate or Bite Size;Self Monitoring   Other Treatments tx trials of thins, SB6, RG7   Diet / Liquid Recommendation Soft & Bite-Sized (6) - (Dysphagia III);Thin (0)   Recommended Route of Medication Administration   Medication Administration  Float Whole with Puree   Patient / Family Goals   Patient /  "Family Goal #1 \"pain meds\"   Goal #1 Outcome Progressing as expected   Short Term Goals   Short Term Goal # 1 Pt will consume a diet of SB6/MT2 with no s/sx of aspiration and min cues.   Goal Outcome # 1 Goal met, new goal added   Short Term Goal # 1 B  Patient will consume meals of soft/bite sized solids and thin liquids with no s/sx of aspiration given direct meal supervision.         "

## 2021-12-16 NOTE — PALLIATIVE CARE
Palliative Care follow-up  Stopped by room and was able to speak with Donna and her  to offer support. Pt had a change in her B/P yesterday and is now doing better. Still requiring HF o2 and will work on sitting up in the chair today. Pt is alert and orientated and able to have insight into her current medical situation. Reviewed the AD with pt and . Donna willing to complete Ad choose her  Colin as her DPOA and #2, #3 and #5. She also expressed that she would not want to be intubated again. She was aware that she is a DNR. 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 Contact information provided and placed on pt's bedside table. All questions answers.       Updated: RN and MD    Plan: Continue to follow and support pt in her health care wishes. Review POLST prior to D/c. Completion of AD with eufemia.     Thank you for allowing Palliative Care to support this patient and family. Contact [phone] for additional assistance, change in patient status, or with any questions/concerns.

## 2021-12-16 NOTE — CARE PLAN
Problem: Knowledge Deficit - Standard  Goal: Patient and family/care givers will demonstrate understanding of plan of care, disease process/condition, diagnostic tests and medications  Outcome: Progressing  Note: Pt updated on POC, tests, and medications. Pt verbalizes understanding and has no further questions at this time. Pt educated on calling for any more questions.        Problem: Fall Risk  Goal: Patient will remain free from falls  Outcome: Progressing  Note: Pt remains free from falls at this time. Safety precautions in place. Pt educated on calling for assistance when needed.      The patient is Watcher - Medium risk of patient condition declining or worsening    Shift Goals  Clinical Goals: Maintain oxygen saturation/Mobility  Patient Goals: Pain control  Family Goals: n/a    Progress made toward(s) clinical / shift goals:      Patient is not progressing towards the following goals:

## 2021-12-16 NOTE — DISCHARGE PLANNING
Care Transition Team Discharge Planning    Anticipated Discharge Disposition: d/c to LTAC    Action: Lsw attended rounds, and pt is not medically cleared.    Barriers to Discharge: medical stability    Plan: Lsw will continue to follow, and assist w/ d/c planning.

## 2021-12-16 NOTE — CARE PLAN
Problem: Hyperinflation  Goal: Prevent or improve atelectasis  Description: 1. Instruct incentive spirometry usage  2.  Perform hyperinflation therapy as indicated  Outcome: Not Met  Flowsheets (Taken 12/15/2021 1707)  Hyperinflation Protocol Goals/Outcome: Improvement in Repeat CXR  Hyperinflation Protocol Indications: Atelectasis Documented by Chest X-Ray     Problem: Ventilation  Goal: Ability to achieve and maintain unassisted ventilation or tolerate decreased levels of ventilator support  Description: Document on Vent flowsheet    1.  Support and monitor invasive and noninvasive mechanical ventilation  2.  Monitor ventilator weaning response  3.  Perform ventilator associated pneumonia prevention interventions  4.  Manage ventilation therapy by monitoring diagnostic test results  Outcome: Progressing     Problem: Humidified High Flow Nasal Cannula  Goal: Maintain adequate oxygenation dependent on patient condition  Description: 1.  Implement humidified high flow oxygen therapy  2.  Titrate high flow oxygen to maintain appropriate SpO2  Outcome: Progressing  Flowsheets (Taken 12/15/2021 1707)  Indication: All other patients: SpO2 less than 90% despite conventional supplemental oxygen devices  Outcome: Normoxia       Respiratory Update    Pt on HFNC 40L, 40%, will continue to tirtate as tolerated. BiPAP PRN 12/6, 60%.  IPV QID    Pt tolerating current treatments well with no adverse reactions.

## 2021-12-16 NOTE — PALLIATIVE CARE
Palliative Care follow-up  Visited with patient and  at  after receiving updates from MD regarding code status and POC. POLST reviewed and completed with Yoan for DNR, selective focused treatment and no TF. Her  was in agreement with these wishes. POLST reviewed and signed by Donna. PC explained what to do with the POLST at home when she returned there and he was understanding of that. POLST reviewed and signed by MD; copy scanned into EMR and original to be sent with pt at time of DC. AD also notarized and original left with her  to take home. No questions/needs at this time.      Updated: MD/RN    Plan: follow and support with POC as needed    Thank you for allowing Palliative Care to support this patient and family. Contact [phone] for additional assistance, change in patient status, or with any questions/concerns.

## 2021-12-16 NOTE — PROGRESS NOTES
Critical Care Progress Note    Date of admission  12/1/2021    Chief Complaint  56 y.o. female admitted 12/1/2021 with acute pancreatitis, pulmonary embolism.  She has a history of prior gastrointestinal bleeding from unknown source, MRSA sinusitis on outpatient antibiotics, [place]'s disease, GERD, primary hypertension, hemochromatosis, hypothyroidism, traumatic brain injury, myocardial infarction and fibromyalgia.    Hospital Course    12/2 -    vent day 1.  Trend hemoglobin and platelet count and transfuse blood products as required.  Full vent support.  Inhaled Flolan for RV dysfunction following cardiac arrest.  12/3 -    vent day 2.  Start bicarbonate drip for MARY LOU.  Titrating norepinephrine.  Wean off Flolan.  12/4 -    vent day 3.  Renal function worse.  Titrating norepinephrine.  12/5 -    vent day 4.  Blood pressure increased.  Change dexmedetomidine to propofol.  12/6 -    vent day 5.  Arrange for IVC filter placement.  Nephrology on board.  12/7 -    vent day 6.  IVC filter placed yesterday.  Renal function improving.  Taper hydrocortisone.  12/8 -    vent day 7.  Improving renal function.  Taper hydrocortisone.  12/9 -    vent day 8.  Start NPH.  Change propofol to dexmedetomidine.  12/10 - V#9, titrating norepinephrine-off, dexmedetomidine, FiO2 increased to 0.6, start lasix  12/11 - V#10, more alert, off pressors, SAT/SBT ongoing, FiO2 weaned to 0.4  12/12 -  V#11, SAT/SBT ongoing, poor weans yesterday-no change today but R SBI normal, O2 30%, neg 3.9L, TX FOB without significant secretions, liberation trial  12/13 -  Extubated yesterday, borderline sats 6L nasal cannula, very difficult to mobilize  12/14 - BiPAP overnight, 12/6-60% tolerating HFNC today, passed swallow eval, no bleeding on heparin drip  12/15 -dropped BP into the 40s at home rounds and we resuscitated her at the bedside for 1/2-hour, NE up to 20 now back to 2, 1 L fluid bolus, hemoglobin unchanged although heparin stopped initially,  Solu-Cortef 100 mg stress dose given  12/16 - hypotension resolved, off NE, HFNC 50/60, WOB low, no bleeding mentating normal, patient completed POLST and change CODE STATUS to DNAR/DNI - reviewed with     Interval Problem Update  Reviewed last 24 hour events:    A&O x4, cognitively intact  Feels better, following well although weak diffusely  Off NE since yesterday afternoon  Blood pressure borderline high this a.m., BP meds held  SR 90s  /100s  UO Good, I/Os neutral  HFNC 50/60  BiPAP standby  PEP -   WOB low, patient speaking in full sentences  T-max 99.1  WBC 16.6  Vanco/C3 per ID 12/21  Hemoglobin 8.2 unchanged  No bleeding clinically  Renal function normal  BMS 50  PICC, IJ CVC    Resume heparin infusion no bolus  Resume beta-blocker half dose, start losartan this evening or sooner if clinically appropriate  Transition back to usual [place]ie's disease regimen  LTACH consult    Completed competency certificate for patient    Serial follow-up, WOB remains low and blood pressure acceptable if not starting to run high.  Resume losartan.  CVP trending up, place on enteral Lasix once a day.  HFNC weaned to 40/50.  Medication adjustments reviewed with clinical pharmacy.    Reviewed CODE STATUS with patient and  at the bedside.  Patient completely back to normal LOC was and cognitively.  Patient requesting full treatment but no reintubation, CPR or defibrillation.  Palliative care team to complete a POLST with the patient and CODE STATUS is changed in the EHR       YESTERDAY  Arrived for rounds team and patient up in a chair eating but feeling weak  Patient not tachycardic and oxygenation acceptable although higher FiO2  Systolic BP though dropped into the 40s and required initiation of pressors.  1 L saline given after great response to Lasix yesterday with 2 L out.  Nathan-Synephrine 200 mcg boluses x5 given and norepinephrine drip initiated at 5 and titrated to 20 and systolic blood pressure  relies in patient no longer symptomatic.  Solu-Cortef 100 mcg given IV x1 and oral Cortef switch to hydrocortisone IV 50 every 6 which will administer overnight.  Repeat hemoglobin form stat and had dropped from 8.6->7.4 but patient not bleeding clinically.  Heparin stopped and repeat hemoglobin obtained 8.2 without transfusion and still no bleeding.     present at the bedside throughout an updated  Suspect related to combination of diuresis for many days now about 7 L negative, a.m. BP meds and IV Dilaudid for pain all given simultaneously.  Continue to monitor for bleed and infection as well as other etiology.    Lethargic, tired-improved  Some nausea - no vomiting - minimal pain  BiPAP HS  HFNC 50/100 - 99% sat  Refused pull toilet this AM when she was not feeling well, encouraged to perform  WBC 15.3  Max 99.1  Vanco/ceftriaxone through 1223 per ID  Mobile's med regimen reviewed again with pharmacist  Heparin drip no bolus, Xa 0.5  PPI IV twice daily  Reviewed 12/2/21 ECHO again, normal LV systolic function, EF 60%, RV dilated, RV systolic function reduced, RVSP 50      Continue Lasix for now  Holding antihypertensive regimen for now, reduce dose and resume as clinically appropriate  Titrate HFNC  Stress HC bolus  Titrate NE  CVP trend  Hold heparin overnight, resume in a.m.?  D/c TF  Reduce NPH    Review of Systems  Review of Systems   Constitutional: Positive for malaise/fatigue (Better).   HENT: Negative for nosebleeds.    Eyes: Negative.    Respiratory: Negative for cough, hemoptysis, sputum production and shortness of breath.    Cardiovascular: Negative for chest pain and palpitations.   Gastrointestinal: Negative for abdominal pain (Improved), blood in stool, nausea and vomiting.   Genitourinary: Negative for flank pain, hematuria and urgency.   Neurological: Positive for weakness (Diffuse). Negative for dizziness and focal weakness.       Vital Signs for last 24 hours   Pulse:  [] 90  Resp:   [13-27] 25  BP: ()/() 127/75  SpO2:  [77 %-97 %] 91 %    Hemodynamic parameters for last 24 hours  CVP:  [9 MM HG-32 MM HG] 18 MM HG    Respiratory Information for the last 24 hours       Physical Exam   Physical Exam  Vitals reviewed.   Constitutional:       Appearance: She is obese. She is ill-appearing (Improved again). She is not toxic-appearing or diaphoretic.      Interventions: She is not intubated.     Comments: HFNC   HENT:      Head: Normocephalic and atraumatic.      Nose: Nose normal.      Mouth/Throat:      Mouth: Mucous membranes are moist.      Pharynx: Oropharynx is clear.   Eyes:      General: No scleral icterus.     Conjunctiva/sclera: Conjunctivae normal.      Pupils: Pupils are equal, round, and reactive to light.   Neck:      Comments: L IJ CVC  Cardiovascular:      Rate and Rhythm: Normal rate and regular rhythm.      Pulses: Normal pulses.      Heart sounds: No murmur heard.  No gallop.       Comments: ST  Pulmonary:      Effort: No accessory muscle usage. She is not intubated.      Breath sounds: Examination of the right-lower field reveals decreased breath sounds. Examination of the left-lower field reveals decreased breath sounds. Decreased breath sounds present. No wheezing or rales (Unchanged).   Abdominal:      General: Bowel sounds are normal. There is no distension.      Palpations: Abdomen is soft.      Tenderness: There is no abdominal tenderness. There is no right CVA tenderness, left CVA tenderness or guarding. Negative signs include Storm's sign.      Comments: Improved   Genitourinary:     Comments: Ana  Musculoskeletal:      Cervical back: Normal range of motion.      Right lower leg: Edema (Improved again) present.      Left lower leg: Edema (Improved again) present.      Comments: No clubbing or cyanosis   Skin:     General: Skin is warm and dry.      Capillary Refill: Capillary refill takes less than 2 seconds.      Coloration: Skin is not cyanotic or mottled.       Nails: There is no clubbing.   Neurological:      General: No focal deficit present.      Mental Status: She is oriented to person, place, and time. She is lethargic.      GCS: GCS eye subscore is 4. GCS verbal subscore is 5. GCS motor subscore is 6.      Cranial Nerves: Cranial nerves are intact. No cranial nerve deficit.      Sensory: No sensory deficit.      Motor: Weakness (Improved) present.      Comments: Follows, back to baseline neurologically, patient is cognitively intact and fully competent to make decisions   Psychiatric:         Mood and Affect: Mood normal. Mood is not anxious.         Behavior: Behavior normal. Behavior is not agitated. Behavior is cooperative.         Thought Content: Thought content normal.         Medications  Current Facility-Administered Medications   Medication Dose Route Frequency Provider Last Rate Last Admin   • carvedilol (COREG) tablet 12.5 mg  12.5 mg Oral QAM Alber Junior M.D.   12.5 mg at 12/16/21 0859   • hydrocortisone sodium succinate PF (Solu-CORTEF) 100 MG injection 25 mg  25 mg Intravenous Q6HRS Alber Junior M.D.   25 mg at 12/16/21 1204   • heparin infusion 25,000 units in 500 mL 0.45% NACL  0-30 Units/kg/hr (Adjusted) Intravenous Continuous Alber Junior M.D. 17.5 mL/hr at 12/16/21 0927 12 Units/kg/hr at 12/16/21 0927   • furosemide (LASIX) tablet 20 mg  20 mg Oral Q DAY Alber Junior M.D.   20 mg at 12/16/21 1210   • losartan (COZAAR) tablet 100 mg  100 mg Oral Q DAY Alber Junior M.D.   100 mg at 12/16/21 1210   • vancomycin (VANCOCIN) 1,250 mg in  mL IVPB  1,250 mg Intravenous Q EVENING Alber Junior M.D.   Stopped at 12/15/21 2050   • insulin NPH (HumuLIN N,NovoLIN N) injection  5 Units Subcutaneous BID Alber Junior M.D.       • lidocaine (LIDODERM) 5 % 1 Patch  1 Patch Transdermal Q24HR Alber Junior M.D.   1 Patch at 12/16/21 0859   • insulin regular (HumuLIN R,NovoLIN R) injection  3-14 Units  Subcutaneous 4X/DAY ACHS Alber Junior M.D.   3 Units at 12/16/21 1207    And   • dextrose 50% (D50W) injection 50 mL  50 mL Intravenous Q15 MIN PRN Alber Junior M.D.       • [Held by provider] AMILoride (MIDAMOR) tablet 5 mg  5 mg Oral Q DAY Alber Junior M.D.   5 mg at 12/15/21 0517   • [Held by provider] carvedilol (COREG) tablet 25 mg  25 mg Oral BID Alber Junior M.D.   25 mg at 12/15/21 0518   • dexamethasone (DECADRON) tablet 0.5 mg  0.5 mg Oral Q12HRS Alber Junior M.D.   0.5 mg at 12/15/21 0520   • [Held by provider] doxazosin (CARDURA) tablet 2 mg  2 mg Oral DAILY Alber Junior M.D.   2 mg at 12/15/21 0519   • fludrocortisone (FLORINEF) tablet 0.1 mg  0.1 mg Oral BID Alber Junior M.D.   0.1 mg at 12/15/21 0518   • gabapentin (NEURONTIN) capsule 200 mg  200 mg Oral Q8HRS Alber Junior M.D.   200 mg at 12/16/21 0516   • hydrocortisone (CORTEF) tablet 20 mg  20 mg Oral DAILY Alber Junior M.D.   20 mg at 12/15/21 0518    And   • hydrocortisone (CORTEF) tablet 10 mg  10 mg Oral Nightly Alber Junior M.D.   10 mg at 12/14/21 1756   • levothyroxine (SYNTHROID) tablet 75 mcg  75 mcg Oral AM ES Alber Junior M.D.   75 mcg at 12/16/21 0516   • liothyronine (CYTOMEL) tablet 25 mcg  25 mcg Oral QAM AC Alber Junior M.D.   25 mcg at 12/16/21 0726   • omeprazole (PRILOSEC) capsule 40 mg  40 mg Oral DAILY Alber Junior M.D.   40 mg at 12/16/21 0516   • senna-docusate (PERICOLACE or SENOKOT S) 8.6-50 MG per tablet 2 Tablet  2 Tablet Oral BID Alber Junior M.D.        And   • polyethylene glycol/lytes (MIRALAX) PACKET 1 Packet  1 Packet Oral QDAY PRN Alber Junior M.D.        And   • magnesium hydroxide (MILK OF MAGNESIA) suspension 30 mL  30 mL Oral QDAY PRN Alber Junior M.D.        And   • bisacodyl (DULCOLAX) suppository 10 mg  10 mg Rectal QDAY PRN Alber Junior M.D.       • acetaminophen (Tylenol) tablet 650 mg  650 mg Oral Q4HRS  PRN Alber Junior M.D.       • ondansetron (ZOFRAN ODT) dispertab 4 mg  4 mg Oral Q4HRS PRN Alber Junior M.D.       • oxyCODONE immediate-release (ROXICODONE) tablet 5 mg  5 mg Oral Q4HRS PRN Alber Junior M.D.   5 mg at 12/14/21 2128    Or   • oxyCODONE immediate release (ROXICODONE) tablet 10 mg  10 mg Oral Q4HRS PRN Alber Junior M.D.   10 mg at 12/16/21 0517   • melatonin tablet 5 mg  5 mg Oral HS PRN - MR X 1 KONSTANTIN ReillyPJelaniRJelaniNJelani   5 mg at 12/16/21 0115   • hydrALAZINE (APRESOLINE) injection 20 mg  20 mg Intravenous Q4HRS PRN Eligio Del Castillo M.D.   20 mg at 12/09/21 0115   • labetalol (NORMODYNE/TRANDATE) injection 10-20 mg  10-20 mg Intravenous Q4HRS PRN Eligio Del Castillo M.D.   20 mg at 12/11/21 0330   • MD Alert...Vancomycin per Pharmacy   Other PHARMACY TO DOSE Eligio Del Castillo M.D.       • cefTRIAXone (Rocephin) 2 g in  mL IVPB  2 g Intravenous Q24HRS Eligio Del Castillo M.D.   Stopped at 12/16/21 0547   • Respiratory Therapy Consult   Nebulization Continuous RT Servando Rush M.D.       • MD Alert...ICU Electrolyte Replacement per Pharmacy   Other PHARMACY TO DOSE Servando Rush M.D.       • Pharmacy Consult Request ...Pain Management Review 1 Each  1 Each Other PHARMACY TO DOSE David Yoder M.D.       • ondansetron (ZOFRAN) syringe/vial injection 4 mg  4 mg Intravenous Q4HRS PRN David Yoder M.D.   4 mg at 12/11/21 2311   • promethazine (PHENERGAN) suppository 12.5-25 mg  12.5-25 mg Rectal Q4HRS PRN David Yoder M.D.           Fluids    Intake/Output Summary (Last 24 hours) at 12/16/2021 1304  Last data filed at 12/16/2021 1000  Gross per 24 hour   Intake 709.53 ml   Output 1560 ml   Net -850.47 ml       Laboratory  Recent Labs     12/13/21 2052   ISTATAPH 7.532*   ISTATAPCO2 39.3*   ISTATAPO2 76   ISTATATCO2 34*   OCMGZUA6OHJ 96   ISTATARTHCO3 33.0*   ISTATARTBE 10*   ISTATTEMP 38.2 C   ISTATFIO2 100   ISTATSPEC Arterial   ISTATAPHTC  7.513*   [id number] 82         Recent Labs     12/14/21  0316 12/15/21  0552 12/16/21  0525   SODIUM 142 144 145   POTASSIUM 3.8 4.1 4.1   CHLORIDE 103 108 109   CO2 29 26 24   BUN 25* 21 16   CREATININE 0.53 0.43* 0.44*   MAGNESIUM 1.6 1.6 1.8   PHOSPHORUS 2.4* 3.3 3.4   CALCIUM 8.6 8.4* 8.5     Recent Labs     12/14/21  0316 12/15/21  0552 12/16/21  0525   ALTSGPT 50 43 43   ASTSGOT 33 30 22   ALKPHOSPHAT 176* 165* 162*   TBILIRUBIN 0.5 0.5 0.4   GLUCOSE 170* 133* 174*     Recent Labs     12/14/21  0316 12/14/21  0316 12/15/21  0552 12/15/21  0552 12/15/21  0940 12/15/21  1330 12/16/21  0525   WBC 15.9*   < > 15.3*   < > 17.3* 16.9* 16.6*   NEUTSPOLYS 80.20*   < > 82.70*   < > 88.10* 92.00* 86.90*   LYMPHOCYTES 8.40*   < > 7.70*   < > 3.30* 2.50* 4.80*   MONOCYTES 6.20   < > 5.90   < > 5.50 2.70 5.70   EOSINOPHILS 0.70   < > 0.70   < > 0.40 0.10 0.00   BASOPHILS 0.10   < > 0.20   < > 0.50 0.20 0.40   ASTSGOT 33  --  30  --   --   --  22   ALTSGPT 50  --  43  --   --   --  43   ALKPHOSPHAT 176*  --  165*  --   --   --  162*   TBILIRUBIN 0.5  --  0.5  --   --   --  0.4    < > = values in this interval not displayed.     Recent Labs     12/15/21  0940 12/15/21  1330 12/16/21  0525 12/16/21  0930   RBC 2.75* 3.06* 3.04*  --    HEMOGLOBIN 7.4* 8.2* 8.2*  --    HEMATOCRIT 25.0* 27.9* 27.6*  --    PLATELETCT 340 366 382  --    PROTHROMBTM  --   --   --  13.9   APTT  --   --   --  26.0   INR  --   --   --  1.10       Imaging  X-Ray:  I have personally reviewed the images and compared with prior images. and My impression is: Unchanged left lower lobe opacities    Assessment/Plan  * Cardiac arrest (HCC)  Assessment & Plan  S/p PEA, asystole and ventricular tachycardia in ED - precipitated by hemorrhagic shock  ROSC after 7 rounds of CPR, IV epinephrine, IV bicarbonate solution and massive transfusion protocol  ECHO with RV dysfunction and RVSP of 50 mmHg  Hemodynamics improved, weaned off pressors  Neurologically improved,  extubated 12/12, diffusely weak  Lidocaine patch for chest wall pain has helped  Reviewed CODE STATUS with patient and her , POLST completed by palliative care, DANR/DNI    Pulmonary hypertension (HCC)  Assessment & Plan  RVSP 50  Monitor for ROSSI  Elevated nicol-arrest?  F/u ECHO and possibly PSG as an outpatient  Forced diuresis as needed, CVP running 13-18    Atelectasis  Assessment & Plan  Multifactorial-status post arrest, prolonged course of ventilator, weak and not moving much, obese with episode of pancreatitis  Monitor for the need for therapeutic bronchoscopy, active TX FOB 12/8, follow-up TX FOB with extubation 12/12 with minimal plugs  Mobilize to chair twice daily  Up for meals, patient and RN encouraged strongly  I-S, may need IPPV-reviewed with RT  RT protocols  Serial chest x-ray as needed    Hypokalemia  Assessment & Plan  Replete potassium, ERP    Paroxysmal atrial fibrillation (HCC)  Assessment & Plan  Rate controlled, currently in SR  Continue carvedilol as hemodynamics allow  Optimize potassium and magnesium  [id number] score high, 10% risk CVA, will need anticoagulation when/if becomes clinically appropriate  Initiatd heparin drip without bolus 12/13    Hypocalcemia  Assessment & Plan  Replete calcium, ERP    Hypomagnesemia  Assessment & Plan  Replete magnesium, ERP    Hemoperitoneum  Assessment & Plan  Surgery has evaluated  No indication for surgery at this time  Trend hemoglobin and transfuse PRBCs to keep hemoglobin greater than 7-no change in greater than 1 week  Unchanged hemoglobin  Status post IVC filter 12/6  Heparin PPx reinitiated 12/10, no bleeding, restarted heparin drip no bolus for PE/PAF 12/13  HGB unchanged, no clinical signs of bleeding-continue to monitor  Reverse with protamine or FFC as needed, hold as needed  Hypotensive episode again 12/15 likely related to BP meds and diuresis, no evidence of bleeding    Gastrointestinal hemorrhage  Assessment & Plan  History of  gastrointestinal hemorrhage with no source identified-no recurrence/monitoring  Possible gastrointestinal hemorrhage provoked by IV heparin for pulmonary embolism - no gross blood noted in stool - positive for occult blood  Continue PPI twice daily  GI consult noted, if rebleeds f/u GI consultation  Trend hemoglobin and transfuse PRBCs to keep hemoglobin greater than 7  Heparin subcu PPx restarted 12/10, trial IV heparin no bolus starting 12/13, consider enteral agent if no bleeding after several days  Status post IVC filter off anticoagulation for A. Fib  HGB unchanged, no clinical signs of bleeding-continue to monitor  Reverse with protamine or FFC as needed, hold s needed  Hypotensive episode 12/16 likely related to BP meds/diuresis-Heparin held for night and hemoglobin unchanged and no evidence of bleeding, resuming heparin drip without bolus    DVT (deep venous thrombosis) (HCC)  Assessment & Plan  Imaging reveals LUE DVT associated with her PICC line and a distal LLE DVT  Anticoagulation strictly contraindicated due to acute gastrointestinal hemorrhage and hemoperitoneum  IVC filter placement on 12/6  Also needs anticoagulation from a PE & PAF [id number] 2 risk score - 10%  Resuming IV heparin without bolus 12/13  Heparin held overnight 12/15 for hypotensive episode, no bleeding identified as she had on admit, heparin resumed 12/16    Shock (HCC)- (present on admission)  Assessment & Plan  Hemorrhagic shock with acute gastrointestinal blood loss and vasodistributive shock - 12/2 off pressors 12/10  Shock has resolved, weaned off norepinephrine  Hemoglobin grossly unchanged monitoring for hypotension/bleeding since resuming IV heparin    Recurrent shock 12/15-suspect hypovolemia, multiple antihypertensives and IV narcotics  Responded to Nathan-Synephrine pops and norepinephrine infusion along with 1 L saline bolus  Patient -2.1 51L/24 hours, -10+ liters since 12/10  Holding losartan, Cardura, amiloride and  carvedilol  DC IV Dilaudid for now  Weaning off norepinephrine now 20 -> 2  Stress dose steroids given in this addisonian patient, wean tomorrow    Hemodynamics improved, now becoming hypertensive again  Resume Coreg at half dose and losartan  Spread out antihypertensive medicines over the course of the day  Reduce dose of IV hydrocortisone supplement for stress  Resume Lasix but p.o. 20 mg only    Acute pancreatitis  Assessment & Plan  Resolved likely  She is having bowel movements  She is tolerating enteral tube feedings  She denies N/V/abdominal pain  Continue to monitor    PE (pulmonary thromboembolism) (HCC)- (present on admission)  Assessment & Plan  Anticoagulation was contraindicated initially  IVC filter placement on 12/6  No change in hemoglobin or significant GI bleeding since heparin stopped, greater than 1 week  No issues with subcu heparin PPx and platelet count has normalized  DC subcu heparin PPx and start heparin without bolus by drip 12/13  Consider transitioning to enteral agent if tolerates IV heparin over the next 72+ hours  Monitoring for bleeding  Heparin 12/15 for hypotensive episode, no bleeding clinically, first hemoglobin trending down but stable thereafter, heparin not reversed  Hemodynamics improved, no bleeding, resume heparin drip no bolus 12/16      Debilitated patient  Assessment & Plan  Very complex medical patient now post arrest and 10-11-day ventilator course with severe diffuse weakness needing Neeru lift to transfer to chair  PT/OT to eval and treat  Speech therapy consult, swallow evaluation  Appears to be doing cognitively okay, monitor for need for cognitive eval  Rehab versus LTAC  Physiatry consultation noted  Not a candidate for SNU given her complex medical scenario and ongoing treatments as she significantly improves    Acute blood loss anemia- (present on admission)  Assessment & Plan  Suspect gastrointestinal source of blood loss as well as acute hemoperitoneum-no  recurrence clinically-monitoring  10 you to trend hemoglobin and transfuse PRBCs to keep hemoglobin greater than 7  Likely no recurrence, hemoglobin unchanged for approximately >7 days  That is post 3 days heparin SQ PPx-> initiate IV heparin without bolus  Continue/resume trial anticoagulation no bleeding on heparin for DVT/PE/PAF    Thrombocytopenia (HCC)- (present on admission)  Assessment & Plan  Trend platelet count and thromboelastogram with platelet mapping as needed  Transfuse platelets as required  Count normalized 12/9-remains normal  Unchanged since restarting heparin    Hemochromatosis- (present on admission)  Assessment & Plan  History of    Adrenal insufficiency (Hampton's disease) (HCC)- (present on admission)  Assessment & Plan  Continue hydrocortisone to 20 mg twice daily  Continue Florinef, 0.1 mg twice daily  Monitor for the need to bump hydrocortisone up with stress  Monitoring hemodynamics/electrolytes closely    Bumped up hydrocortisone for stress with hypotension 12/15-now tapering    Elevated LFTs- (present on admission)  Assessment & Plan  Due to ischemic hepatopathy from cardiac arrest and shock  Trend enzymes and synthetic function  Avoid hepatotoxins  Alk phos and transaminases trending down - continue to monitor    MRSA and E. coli sinusitis  Assessment & Plan  On outpatient IV antibiotics - LUE PICC in place  Continue vancomycin and ceftriaxone-6-week course of antibiotics planned per ID-to complete 12/21 per pharmacy  monitor for secondary infections and antibiotic complications    Acute kidney injury (HCC)- (present on admission)  Assessment & Plan  Continues to slowly improve, creatinine normalized and BUN still coming down  Suspect ATN due to shock in lady with underlying atrophic kidneys  Monitor renal function and urine output  Avoid nephrotoxins and renal dose medications  Serial BMP    Primary hypertension- (present on admission)  Assessment & Plan  Goal SBP less than  160  Hydralazine and labetalol as necessary to achieve blood pressure goals-if frequently required adjust enteral regimen  Hold amiloride, 5 mg daily  Hold doxazosin, 2 mg daily  Resume carvedilol, 12.5 mg twice daily  Resume losartan 100 mg  Asked pharmacy to spread out the BP meds over the course the day and not give him all in the a.m.!    Hypothyroidism- (present on admission)  Assessment & Plan  Continue levothyroxine and liothyronine  Repeat TSH in 4 weeks, sooner if significant arrhythmias occur without obvious etiology  Most recent TSH borderline low    Obesity (BMI 35.0-39.9 without comorbidity)- (present on admission)  Assessment & Plan  Behavioral modification and weight loss encouraged again  Physical therapy and possible rehab versus LTACH recommended as well  Hypothyroid on repletion therapy by Dr. Jeff  TSH borderline low  Monitor for ROSSI - PAP as needed    Acute respiratory failure with hypoxia (HCC)- (present on admission)  Assessment & Plan  Intubated 12/2-extubated 12/12  RT protocols  Mobility level 2-3  Aggressive pulmonary toilet, repeat TX FOB as needed  Forced diuresis ongoing - dose with diamox?  Serial chest x-ray as needed  BiPAP for support post extubation as needed  Alternate with HFNC  Heparin for PE when clinically safe-resumed     VTE:  Contraindicated  Ulcer: PPI  Lines: Central Line  Ongoing indication addressed and Perez Catheter  Ongoing indication addressed     I have performed a physical exam and reviewed and updated ROS and Plan today (12/16/2021). In review of yesterday's note (12/15/2021), there are no changes except as documented above.     Discussed patient condition and risk of morbidity and/or mortality with Family, RN, RT, Pharmacy, , Charge nurse / hot rounds, Patient and Palliative care APRN     The patient remains critically ill.  Actively titrating norepinephrine for hypotension.  Patient requiring BiPAP for hypoxemia and hypoventilation.   Alternating with HFNC.  Monitoring for the need to re-intubate.    Critical care time = 45 minutes in directly providing and coordinating critical care and extensive data review.  No time overlap and excludes procedures.

## 2021-12-17 LAB
ALBUMIN SERPL BCP-MCNC: 2.6 G/DL (ref 3.2–4.9)
ALBUMIN/GLOB SERPL: 0.9 G/DL
ALP SERPL-CCNC: 171 U/L (ref 30–99)
ALT SERPL-CCNC: 54 U/L (ref 2–50)
ANION GAP SERPL CALC-SCNC: 10 MMOL/L (ref 7–16)
AST SERPL-CCNC: 50 U/L (ref 12–45)
BASOPHILS # BLD AUTO: 0.3 % (ref 0–1.8)
BASOPHILS # BLD: 0.04 K/UL (ref 0–0.12)
BILIRUB SERPL-MCNC: 0.5 MG/DL (ref 0.1–1.5)
BUN SERPL-MCNC: 15 MG/DL (ref 8–22)
CALCIUM SERPL-MCNC: 8.3 MG/DL (ref 8.5–10.5)
CHLORIDE SERPL-SCNC: 110 MMOL/L (ref 96–112)
CO2 SERPL-SCNC: 26 MMOL/L (ref 20–33)
CREAT SERPL-MCNC: 0.56 MG/DL (ref 0.5–1.4)
EOSINOPHIL # BLD AUTO: 0.04 K/UL (ref 0–0.51)
EOSINOPHIL NFR BLD: 0.3 % (ref 0–6.9)
ERYTHROCYTE [DISTWIDTH] IN BLOOD BY AUTOMATED COUNT: 74.8 FL (ref 35.9–50)
GLOBULIN SER CALC-MCNC: 3 G/DL (ref 1.9–3.5)
GLUCOSE BLD-MCNC: 162 MG/DL (ref 65–99)
GLUCOSE BLD-MCNC: 170 MG/DL (ref 65–99)
GLUCOSE BLD-MCNC: 174 MG/DL (ref 65–99)
GLUCOSE BLD-MCNC: 195 MG/DL (ref 65–99)
GLUCOSE SERPL-MCNC: 159 MG/DL (ref 65–99)
HCT VFR BLD AUTO: 27.5 % (ref 37–47)
HGB BLD-MCNC: 8.1 G/DL (ref 12–16)
IMM GRANULOCYTES # BLD AUTO: 0.68 K/UL (ref 0–0.11)
IMM GRANULOCYTES NFR BLD AUTO: 4.4 % (ref 0–0.9)
LYMPHOCYTES # BLD AUTO: 0.97 K/UL (ref 1–4.8)
LYMPHOCYTES NFR BLD: 6.2 % (ref 22–41)
MAGNESIUM SERPL-MCNC: 1.7 MG/DL (ref 1.5–2.5)
MCH RBC QN AUTO: 26.9 PG (ref 27–33)
MCHC RBC AUTO-ENTMCNC: 29.5 G/DL (ref 33.6–35)
MCV RBC AUTO: 91.4 FL (ref 81.4–97.8)
MONOCYTES # BLD AUTO: 0.96 K/UL (ref 0–0.85)
MONOCYTES NFR BLD AUTO: 6.2 % (ref 0–13.4)
MORPHOLOGY BLD-IMP: NORMAL
NEUTROPHILS # BLD AUTO: 12.88 K/UL (ref 2–7.15)
NEUTROPHILS NFR BLD: 82.6 % (ref 44–72)
NRBC # BLD AUTO: 0.23 K/UL
NRBC BLD-RTO: 1.5 /100 WBC
PHOSPHATE SERPL-MCNC: 3.4 MG/DL (ref 2.5–4.5)
PLATELET # BLD AUTO: 393 K/UL (ref 164–446)
PMV BLD AUTO: 11.5 FL (ref 9–12.9)
POTASSIUM SERPL-SCNC: 3.5 MMOL/L (ref 3.6–5.5)
PROT SERPL-MCNC: 5.6 G/DL (ref 6–8.2)
RBC # BLD AUTO: 3.01 M/UL (ref 4.2–5.4)
SODIUM SERPL-SCNC: 146 MMOL/L (ref 135–145)
UFH PPP CHRO-ACNC: 0.23 IU/ML
UFH PPP CHRO-ACNC: 0.43 IU/ML
WBC # BLD AUTO: 15.6 K/UL (ref 4.8–10.8)

## 2021-12-17 PROCEDURE — 700105 HCHG RX REV CODE 258: Performed by: INTERNAL MEDICINE

## 2021-12-17 PROCEDURE — 94660 CPAP INITIATION&MGMT: CPT

## 2021-12-17 PROCEDURE — 85025 COMPLETE CBC W/AUTO DIFF WBC: CPT

## 2021-12-17 PROCEDURE — 700111 HCHG RX REV CODE 636 W/ 250 OVERRIDE (IP): Performed by: INTERNAL MEDICINE

## 2021-12-17 PROCEDURE — 85520 HEPARIN ASSAY: CPT | Mod: 91

## 2021-12-17 PROCEDURE — 97530 THERAPEUTIC ACTIVITIES: CPT

## 2021-12-17 PROCEDURE — 84100 ASSAY OF PHOSPHORUS: CPT

## 2021-12-17 PROCEDURE — 80053 COMPREHEN METABOLIC PANEL: CPT

## 2021-12-17 PROCEDURE — 700101 HCHG RX REV CODE 250: Performed by: INTERNAL MEDICINE

## 2021-12-17 PROCEDURE — A9270 NON-COVERED ITEM OR SERVICE: HCPCS | Performed by: INTERNAL MEDICINE

## 2021-12-17 PROCEDURE — 83735 ASSAY OF MAGNESIUM: CPT

## 2021-12-17 PROCEDURE — 99291 CRITICAL CARE FIRST HOUR: CPT | Performed by: INTERNAL MEDICINE

## 2021-12-17 PROCEDURE — 770022 HCHG ROOM/CARE - ICU (200)

## 2021-12-17 PROCEDURE — 92526 ORAL FUNCTION THERAPY: CPT

## 2021-12-17 PROCEDURE — 94640 AIRWAY INHALATION TREATMENT: CPT

## 2021-12-17 PROCEDURE — 700102 HCHG RX REV CODE 250 W/ 637 OVERRIDE(OP): Performed by: INTERNAL MEDICINE

## 2021-12-17 PROCEDURE — 97535 SELF CARE MNGMENT TRAINING: CPT

## 2021-12-17 PROCEDURE — 94669 MECHANICAL CHEST WALL OSCILL: CPT

## 2021-12-17 PROCEDURE — 82962 GLUCOSE BLOOD TEST: CPT | Mod: 91

## 2021-12-17 RX ORDER — CARVEDILOL 25 MG/1
25 TABLET ORAL 2 TIMES DAILY WITH MEALS
Status: DISCONTINUED | OUTPATIENT
Start: 2021-12-17 | End: 2021-12-23

## 2021-12-17 RX ORDER — POTASSIUM CHLORIDE 20 MEQ/1
40 TABLET, EXTENDED RELEASE ORAL 2 TIMES DAILY
Status: COMPLETED | OUTPATIENT
Start: 2021-12-17 | End: 2021-12-17

## 2021-12-17 RX ORDER — CARVEDILOL 25 MG/1
25 TABLET ORAL EVERY MORNING
Status: DISCONTINUED | OUTPATIENT
Start: 2021-12-18 | End: 2021-12-17

## 2021-12-17 RX ORDER — MAGNESIUM SULFATE HEPTAHYDRATE 40 MG/ML
2 INJECTION, SOLUTION INTRAVENOUS ONCE
Status: COMPLETED | OUTPATIENT
Start: 2021-12-17 | End: 2021-12-17

## 2021-12-17 RX ADMIN — HYDROCORTISONE SODIUM SUCCINATE 25 MG: 100 INJECTION, POWDER, FOR SOLUTION INTRAMUSCULAR; INTRAVENOUS at 06:52

## 2021-12-17 RX ADMIN — OXYCODONE HYDROCHLORIDE 10 MG: 10 TABLET ORAL at 20:44

## 2021-12-17 RX ADMIN — GABAPENTIN 200 MG: 100 CAPSULE ORAL at 21:05

## 2021-12-17 RX ADMIN — HYDROCORTISONE SODIUM SUCCINATE 25 MG: 100 INJECTION, POWDER, FOR SOLUTION INTRAMUSCULAR; INTRAVENOUS at 12:31

## 2021-12-17 RX ADMIN — INSULIN HUMAN 5 UNITS: 100 INJECTION, SUSPENSION SUBCUTANEOUS at 17:37

## 2021-12-17 RX ADMIN — OXYCODONE HYDROCHLORIDE 10 MG: 10 TABLET ORAL at 02:20

## 2021-12-17 RX ADMIN — LIDOCAINE 1 PATCH: 50 PATCH TOPICAL at 10:22

## 2021-12-17 RX ADMIN — HYDROCORTISONE SODIUM SUCCINATE 25 MG: 100 INJECTION, POWDER, FOR SOLUTION INTRAMUSCULAR; INTRAVENOUS at 17:28

## 2021-12-17 RX ADMIN — CARVEDILOL 25 MG: 25 TABLET, FILM COATED ORAL at 17:29

## 2021-12-17 RX ADMIN — INSULIN HUMAN 5 UNITS: 100 INJECTION, SUSPENSION SUBCUTANEOUS at 09:12

## 2021-12-17 RX ADMIN — OXYCODONE HYDROCHLORIDE 10 MG: 10 TABLET ORAL at 14:45

## 2021-12-17 RX ADMIN — GABAPENTIN 200 MG: 100 CAPSULE ORAL at 06:51

## 2021-12-17 RX ADMIN — CEFTRIAXONE SODIUM 2 G: 2 INJECTION, POWDER, FOR SOLUTION INTRAMUSCULAR; INTRAVENOUS at 07:01

## 2021-12-17 RX ADMIN — POTASSIUM CHLORIDE 40 MEQ: 1500 TABLET, EXTENDED RELEASE ORAL at 10:21

## 2021-12-17 RX ADMIN — INSULIN HUMAN 3 UNITS: 100 INJECTION, SOLUTION PARENTERAL at 09:12

## 2021-12-17 RX ADMIN — HYDROCORTISONE 10 MG: 20 TABLET ORAL at 17:30

## 2021-12-17 RX ADMIN — CARVEDILOL 12.5 MG: 12.5 TABLET, FILM COATED ORAL at 06:52

## 2021-12-17 RX ADMIN — SENNOSIDES AND DOCUSATE SODIUM 2 TABLET: 50; 8.6 TABLET ORAL at 06:52

## 2021-12-17 RX ADMIN — FUROSEMIDE 20 MG: 20 TABLET ORAL at 06:51

## 2021-12-17 RX ADMIN — DEXAMETHASONE 0.5 MG: 1 TABLET ORAL at 07:39

## 2021-12-17 RX ADMIN — LEVOTHYROXINE SODIUM 75 MCG: 0.07 TABLET ORAL at 06:52

## 2021-12-17 RX ADMIN — GABAPENTIN 200 MG: 100 CAPSULE ORAL at 14:45

## 2021-12-17 RX ADMIN — LIOTHYRONINE SODIUM 25 MCG: 25 TABLET ORAL at 07:40

## 2021-12-17 RX ADMIN — OXYCODONE HYDROCHLORIDE 10 MG: 10 TABLET ORAL at 06:57

## 2021-12-17 RX ADMIN — VANCOMYCIN HYDROCHLORIDE 1250 MG: 5 INJECTION, POWDER, LYOPHILIZED, FOR SOLUTION INTRAVENOUS at 17:30

## 2021-12-17 RX ADMIN — FLUDROCORTISONE ACETATE 0.1 MG: 0.1 TABLET ORAL at 17:29

## 2021-12-17 RX ADMIN — HYDROCORTISONE 20 MG: 20 TABLET ORAL at 06:50

## 2021-12-17 RX ADMIN — MAGNESIUM SULFATE HEPTAHYDRATE 2 G: 2 INJECTION, SOLUTION INTRAVENOUS at 10:22

## 2021-12-17 RX ADMIN — HEPARIN SODIUM 20 UNITS/KG/HR: 5000 INJECTION, SOLUTION INTRAVENOUS at 10:23

## 2021-12-17 RX ADMIN — FLUDROCORTISONE ACETATE 0.1 MG: 0.1 TABLET ORAL at 06:52

## 2021-12-17 RX ADMIN — INSULIN HUMAN 3 UNITS: 100 INJECTION, SOLUTION PARENTERAL at 12:22

## 2021-12-17 RX ADMIN — POTASSIUM CHLORIDE 40 MEQ: 1500 TABLET, EXTENDED RELEASE ORAL at 17:29

## 2021-12-17 RX ADMIN — OMEPRAZOLE 40 MG: 20 CAPSULE, DELAYED RELEASE ORAL at 06:50

## 2021-12-17 RX ADMIN — HYDROCORTISONE SODIUM SUCCINATE 25 MG: 100 INJECTION, POWDER, FOR SOLUTION INTRAMUSCULAR; INTRAVENOUS at 01:00

## 2021-12-17 RX ADMIN — DEXAMETHASONE 0.5 MG: 1 TABLET ORAL at 17:31

## 2021-12-17 RX ADMIN — INSULIN HUMAN 3 UNITS: 100 INJECTION, SOLUTION PARENTERAL at 21:13

## 2021-12-17 ASSESSMENT — COGNITIVE AND FUNCTIONAL STATUS - GENERAL
MOVING TO AND FROM BED TO CHAIR: A LOT
DAILY ACTIVITIY SCORE: 12
HELP NEEDED FOR BATHING: A LOT
PERSONAL GROOMING: A LITTLE
SUGGESTED CMS G CODE MODIFIER DAILY ACTIVITY: CL
DRESSING REGULAR LOWER BODY CLOTHING: TOTAL
CLIMB 3 TO 5 STEPS WITH RAILING: TOTAL
DRESSING REGULAR UPPER BODY CLOTHING: A LOT
TURNING FROM BACK TO SIDE WHILE IN FLAT BAD: A LOT
STANDING UP FROM CHAIR USING ARMS: TOTAL
SUGGESTED CMS G CODE MODIFIER MOBILITY: CM
WALKING IN HOSPITAL ROOM: TOTAL
TOILETING: TOTAL
MOVING FROM LYING ON BACK TO SITTING ON SIDE OF FLAT BED: A LOT
MOBILITY SCORE: 9
EATING MEALS: A LITTLE

## 2021-12-17 ASSESSMENT — ENCOUNTER SYMPTOMS
FOCAL WEAKNESS: 0
SHORTNESS OF BREATH: 0
BRUISES/BLEEDS EASILY: 0
NERVOUS/ANXIOUS: 0
NAUSEA: 0
COUGH: 0
EYES NEGATIVE: 1
HEMOPTYSIS: 0
PALPITATIONS: 0
BLOOD IN STOOL: 0
FLANK PAIN: 0
WEAKNESS: 1
DIZZINESS: 0
VOMITING: 0
SPUTUM PRODUCTION: 0
ABDOMINAL PAIN: 0

## 2021-12-17 ASSESSMENT — PAIN DESCRIPTION - PAIN TYPE
TYPE: ACUTE PAIN
TYPE: CHRONIC PAIN
TYPE: ACUTE PAIN
TYPE: CHRONIC PAIN
TYPE: ACUTE PAIN
TYPE: CHRONIC PAIN

## 2021-12-17 ASSESSMENT — GAIT ASSESSMENTS: GAIT LEVEL OF ASSIST: UNABLE TO PARTICIPATE

## 2021-12-17 ASSESSMENT — PAIN SCALES - WONG BAKER: WONGBAKER_NUMERICALRESPONSE: HURTS A LITTLE MORE

## 2021-12-17 NOTE — THERAPY
Physical Therapy   Daily Treatment     Patient Name: Donna Isaac  Age:  56 y.o., Sex:  female  Medical Record #: [id number]  Today's Date: 12/17/2021     Precautions  Precautions: Fall Risk;Swallow Precautions ( See Comments)    Assessment    Pt struggled with sit to stand with hands pushing off mattress, but much better when allowed to pull on FWW with spouse to stabilize and bed height raised. Will continue, next treatment will try Elizabeth Steady to allow pt to use this technique without need for FWW stabilization. Pt was too weak to attempt pivot to bs chair as she struggled to get out of chair last time. Pt is more appropriate for cardiac chair transfer with nsg. PT to continue.     Plan    Continue current treatment plan.    DC Equipment Recommendations: Unable to determine at this time  Discharge Recommendations: Recommend post-acute placement for additional physical therapy services prior to discharge home       Objective       12/17/21 1207   Total Time Spent   Total Time Spent (Mins) 32   Charge Group   Charges  Yes   PT Therapeutic Activities 2   Precautions   Precautions Fall Risk;Swallow Precautions ( See Comments)   Vitals   O2 Delivery Device Heated High Flow Nasal Cannula   Pain 0 - 10 Group   Therapist Pain Assessment During Activity;Nurse Notified  (sternal/ribs pain with back to bed,  better with HOB up.)   Cognition    Cognition / Consciousness WDL   Level of Consciousness Alert   Comments alert, motivated.   (spouse present, Colin, very helpful. )   Active ROM Lower Body    Active ROM Lower Body  WDL   Strength Lower Body   Lower Body Strength  X   Gross Strength Generalized Weakness, Equal Bilaterally   Balance   Sitting Balance (Static) Fair   Sitting Balance (Dynamic) Fair -   Standing Balance (Static) Trace +   Standing Balance (Dynamic) Trace +   Weight Shift Sitting Fair   Weight Shift Standing Poor   Skilled Intervention Verbal Cuing   Comments withFWW   Gait Analysis   Gait Level Of  Assist Unable to Participate   Comments unable to take steps, but seated scoot with partial stand along EOB was strong.    Bed Mobility    Supine to Sit Moderate Assist   Sit to Supine Moderate Assist   Scooting Minimal Assist  (seated scoot)   Rolling Minimal Assist to Rt.;Minimum Assist to Lt.   Skilled Intervention Verbal Cuing   Functional Mobility   Sit to Stand Maximal Assist  (x 4 reps with bed height raised. )   Bed, Chair, Wheelchair Transfer Unable to Participate   Comments too weak to attempt stand-pivot.    How much difficulty does the patient currently have...   Turning over in bed (including adjusting bedclothes, sheets and blankets)? 2   Sitting down on and standing up from a chair with arms (e.g., wheelchair, bedside commode, etc.) 2   Moving from lying on back to sitting on the side of the bed? 2   How much help from another person does the patient currently need...   Moving to and from a bed to a chair (including a wheelchair)? 1   Need to walk in a hospital room? 1   Climbing 3-5 steps with a railing? 1   6 clicks Mobility Score 9   Activity Tolerance   Sitting Edge of Bed 5+   Short Term Goals    Short Term Goal # 1 pt will perform supine <> sit with min A in 6 visits for improved independence   Goal Outcome # 1 goal not met   Short Term Goal # 2 pt will perform STS with min A in 6 visits for improved independence   Goal Outcome # 2 Goal not met   Short Term Goal # 3 pt will perform SPT with min A in 6 visits to sit up in chair   Goal Outcome # 3 Goal not met   Short Term Goal # 4 pt will ambulate 50ft with FWW and min A in 6 visits to initiate gait   Goal Outcome # 4 Goal not met   Education Group   Role of Physical Therapist Patient Response Patient;Family;Acceptance;Explanation;Verbal Demonstration   Additional Comments pt to do heel slide in bed with spouse assist to initiate and glut squeeze as HEP   Interdisciplinary Plan of Care Collaboration   IDT Collaboration with  Nursing   Patient  Position at End of Therapy In Bed;Call Light within Reach;Tray Table within Reach;Phone within Reach;Family / Friend in Room   Collaboration Comments nsg updated   Session Information   Date / Session Number  12/17-2 (2/3, 12/20)

## 2021-12-17 NOTE — PROGRESS NOTES
Critical Care Progress Note    Date of admission  12/1/2021    Chief Complaint  56 y.o. female admitted 12/1/2021 with acute pancreatitis, pulmonary embolism.  She has a history of prior gastrointestinal bleeding from unknown source, MRSA sinusitis on outpatient antibiotics, Nubieber's disease, GERD, primary hypertension, hemochromatosis, hypothyroidism, traumatic brain injury, myocardial infarction and fibromyalgia.    Hospital Course    12/2 -    vent day 1.  Trend hemoglobin and platelet count and transfuse blood products as required.  Full vent support.  Inhaled Flolan for RV dysfunction following cardiac arrest.  12/3 -    vent day 2.  Start bicarbonate drip for MARY LOU.  Titrating norepinephrine.  Wean off Flolan.  12/4 -    vent day 3.  Renal function worse.  Titrating norepinephrine.  12/5 -    vent day 4.  Blood pressure increased.  Change dexmedetomidine to propofol.  12/6 -    vent day 5.  Arrange for IVC filter placement.  Nephrology on board.  12/7 -    vent day 6.  IVC filter placed yesterday.  Renal function improving.  Taper hydrocortisone.  12/8 -    vent day 7.  Improving renal function.  Taper hydrocortisone.  12/9 -    vent day 8.  Start NPH.  Change propofol to dexmedetomidine.  12/10 - V#9, titrating norepinephrine-off, dexmedetomidine, FiO2 increased to 0.6, start lasix  12/11 - V#10, more alert, off pressors, SAT/SBT ongoing, FiO2 weaned to 0.4  12/12 -  V#11, SAT/SBT ongoing, poor weans yesterday-no change today but R SBI normal, O2 30%, neg 3.9L, TX FOB without significant secretions, liberation trial  12/13 -  Extubated yesterday, borderline sats 6L nasal cannula, very difficult to mobilize  12/14 - BiPAP overnight, 12/6-60% tolerating HFNC today, passed swallow eval, no bleeding on heparin drip  12/15 -dropped BP into the 40s at home rounds and we resuscitated her at the bedside for 1/2-hour, NE up to 20 now back to 2, 1 L fluid bolus, hemoglobin unchanged although heparin stopped initially,  Solu-Cortef 100 mg stress dose given  12/16 - hypotension resolved, off NE, HFNC 50/60, WOB low, no bleeding mentating normal, patient completed POLST and change CODE STATUS to DNAR/DNI - reviewed with   12/17 -BP borderline high, resuming full dose Coreg, HFNC 40/50, no bleeding on heparin drip, tapering IV HC off today    Interval Problem Update  Reviewed last 24 hour events:    A&O x4  SR 90s  -140s  UO good, I/Os  Neg 567  HFNC 40/50 - 91-94% sat  BiPAP 12/6 60% - sleep  /PEP - IPV  WOB low  TM 99.7  WBC 15.6 slowly trending down  Vanco/ceftriaxone-per ID through 12/21  K 3.5, mag 1.7  PPI  Hemoglobin 8.1-no change-no bleeding clinically  Heparin drip  LFTs slightly elevated  Glucose levels running 160-180  Hydrocortisone tapering, back on home Valentín's disease regimen  Lasix  Coreg/losartan  Holding amiloride/Cardura      Increase BB dose back to normal  Hold on amiloride and Cardura this a.m. anyway  Mobilize/IS/PEP/IPPV  Rehab versus LTAC consult pending  Replete K/mag  May need higher dose lasix?  Wean off HC, cont Addisons regimen       YESTERDAY  A&O x4, cognitively intact  Feels better, following well although weak diffusely  Off NE since yesterday afternoon  Blood pressure borderline high this a.m., BP meds held  SR 90s  /100s  UO Good, I/Os neutral  HFNC 50/60  BiPAP standby  PEP -   WOB low, patient speaking in full sentences  T-max 99.1  WBC 16.6  Vanco/C3 per ID 12/21  Hemoglobin 8.2 unchanged  No bleeding clinically  Renal function normal  BMS 50  PICC, IJ CVC    Resume heparin infusion no bolus  Resume beta-blocker half dose, start losartan this evening or sooner if clinically appropriate  Transition back to usual Valentín's disease regimen  LTACH consult    Completed competency certificate for patient    Serial follow-up, WOB remains low and blood pressure acceptable if not starting to run high.  Resume losartan.  CVP trending up, place on enteral Lasix once a day.   HFNC weaned to 40/50.  Medication adjustments reviewed with clinical pharmacy.    Reviewed CODE STATUS with patient and  at the bedside.  Patient completely back to normal LOC was and cognitively.  Patient requesting full treatment but no reintubation, CPR or defibrillation.  Palliative care team to complete a POLST with the patient and CODE STATUS is changed in the EHR    Review of Systems  Review of Systems   Constitutional: Positive for malaise/fatigue (Better day again, still weak).   HENT: Negative for nosebleeds.    Eyes: Negative.    Respiratory: Negative for cough, hemoptysis, sputum production and shortness of breath.    Cardiovascular: Negative for chest pain and palpitations.   Gastrointestinal: Negative for abdominal pain (Improved), blood in stool, nausea and vomiting.   Genitourinary: Negative for flank pain, hematuria and urgency.   Neurological: Positive for weakness (Diffuse, no change). Negative for dizziness and focal weakness.   Endo/Heme/Allergies: Does not bruise/bleed easily.   Psychiatric/Behavioral: The patient is not nervous/anxious.        Vital Signs for last 24 hours   Pulse:  [] 90  Resp:  [13-33] 33  BP: (108-154)/(64-98) 118/75  SpO2:  [88 %-99 %] 90 %    Hemodynamic parameters for last 24 hours  CVP:  [7 MM HG-30 MM HG] 14 MM HG    Respiratory Information for the last 24 hours       Physical Exam   Physical Exam  Vitals reviewed.   Constitutional:       Appearance: She is obese. She is not ill-appearing (More chronic than acute at this time), toxic-appearing or diaphoretic.      Interventions: She is not intubated.     Comments: HFNC   HENT:      Head: Normocephalic and atraumatic.      Nose: Nose normal.      Mouth/Throat:      Mouth: Mucous membranes are moist.      Pharynx: Oropharynx is clear.   Eyes:      General: No scleral icterus.     Conjunctiva/sclera: Conjunctivae normal.      Pupils: Pupils are equal, round, and reactive to light.   Neck:      Comments: L IJ  CVC  Cardiovascular:      Rate and Rhythm: Normal rate and regular rhythm.      Pulses: Normal pulses.      Heart sounds: No murmur heard.  No gallop.       Comments: ST  Pulmonary:      Effort: No accessory muscle usage. She is not intubated.      Breath sounds: Examination of the right-lower field reveals decreased breath sounds. Examination of the left-lower field reveals decreased breath sounds. Decreased breath sounds present. No wheezing or rales (Unchanged).   Abdominal:      General: Bowel sounds are normal. There is no distension.      Palpations: Abdomen is soft.      Tenderness: There is no abdominal tenderness. There is no right CVA tenderness, left CVA tenderness or guarding. Negative signs include Storm's sign.      Comments: Improved   Genitourinary:     Comments: Ana  Musculoskeletal:      Cervical back: Normal range of motion.      Right lower leg: Right lower leg edema: No change.      Left lower leg: Edema (No change) present.      Comments: No clubbing or cyanosis   Skin:     General: Skin is warm and dry.      Capillary Refill: Capillary refill takes less than 2 seconds.      Coloration: Skin is not cyanotic or mottled.      Nails: There is no clubbing.   Neurological:      General: No focal deficit present.      Mental Status: She is oriented to person, place, and time. She is lethargic.      GCS: GCS eye subscore is 4. GCS verbal subscore is 5. GCS motor subscore is 6.      Cranial Nerves: Cranial nerves are intact. No cranial nerve deficit.      Sensory: No sensory deficit.      Motor: Weakness (slowly better from a week ago) present.      Comments: Follows, back to baseline neurologically, patient is cognitively intact and fully competent to make decisions   Psychiatric:         Mood and Affect: Mood normal. Mood is not anxious.         Behavior: Behavior normal. Behavior is not agitated. Behavior is cooperative.         Thought Content: Thought content normal.          Medications  Current Facility-Administered Medications   Medication Dose Route Frequency Provider Last Rate Last Admin   • potassium chloride SA (Kdur) tablet 40 mEq  40 mEq Oral BID Alber Junior M.D.   40 mEq at 12/17/21 1021   • magnesium sulfate IVPB premix 2 g  2 g Intravenous Once Alber Junior M.D. 25 mL/hr at 12/17/21 1022 2 g at 12/17/21 1022   • carvedilol (COREG) tablet 25 mg  25 mg Oral BID WITH MEALS Alber Junior M.D.       • hydrocortisone sodium succinate PF (Solu-CORTEF) 100 MG injection 25 mg  25 mg Intravenous Q6HRS Alber Junior M.D.   25 mg at 12/17/21 0652   • heparin infusion 25,000 units in 500 mL 0.45% NACL  0-30 Units/kg/hr (Adjusted) Intravenous Continuous Alber Junior M.D. 29.1 mL/hr at 12/17/21 1023 20 Units/kg/hr at 12/17/21 1023   • furosemide (LASIX) tablet 20 mg  20 mg Oral Q DAY Alber Junior M.D.   20 mg at 12/17/21 0651   • losartan (COZAAR) tablet 100 mg  100 mg Oral Q DAY Alber Junior M.D.   100 mg at 12/16/21 1210   • vancomycin (VANCOCIN) 1,250 mg in  mL IVPB  1,250 mg Intravenous Q EVENING Alber Junior M.D.   Stopped at 12/16/21 2011   • insulin NPH (HumuLIN N,NovoLIN N) injection  5 Units Subcutaneous BID Alber Junior M.D.   5 Units at 12/17/21 0912   • lidocaine (LIDODERM) 5 % 1 Patch  1 Patch Transdermal Q24HR Alber Junior M.D.   1 Patch at 12/17/21 1022   • insulin regular (HumuLIN R,NovoLIN R) injection  3-14 Units Subcutaneous 4X/DAY ACHS Alber Junior M.D.   3 Units at 12/17/21 0912    And   • dextrose 50% (D50W) injection 50 mL  50 mL Intravenous Q15 MIN PRN Alber Junior M.D.       • [Held by provider] AMILoride (MIDAMOR) tablet 5 mg  5 mg Oral Q DAY Alber Junior M.D.   5 mg at 12/15/21 0517   • dexamethasone (DECADRON) tablet 0.5 mg  0.5 mg Oral Q12HRS Alber Junior M.D.   0.5 mg at 12/17/21 0739   • [Held by provider] doxazosin (CARDURA) tablet 2 mg  2 mg Oral DAILY Alber GARCIA  ERNESTO Juinor   2 mg at 12/15/21 0519   • fludrocortisone (FLORINEF) tablet 0.1 mg  0.1 mg Oral BID Alber Junior M.D.   0.1 mg at 12/17/21 0652   • gabapentin (NEURONTIN) capsule 200 mg  200 mg Oral Q8HRS Alber Junior M.D.   200 mg at 12/17/21 0651   • hydrocortisone (CORTEF) tablet 20 mg  20 mg Oral DAILY Alber Junior M.D.   20 mg at 12/17/21 0650    And   • hydrocortisone (CORTEF) tablet 10 mg  10 mg Oral Nightly Alber Junior M.D.   10 mg at 12/16/21 1713   • levothyroxine (SYNTHROID) tablet 75 mcg  75 mcg Oral AM ES Alber Junior M.D.   75 mcg at 12/17/21 0652   • liothyronine (CYTOMEL) tablet 25 mcg  25 mcg Oral QAM AC Alber Junior M.D.   25 mcg at 12/17/21 0740   • omeprazole (PRILOSEC) capsule 40 mg  40 mg Oral DAILY Alber Junior M.D.   40 mg at 12/17/21 0650   • senna-docusate (PERICOLACE or SENOKOT S) 8.6-50 MG per tablet 2 Tablet  2 Tablet Oral BID Alber Junior M.D.   2 Tablet at 12/17/21 0652    And   • polyethylene glycol/lytes (MIRALAX) PACKET 1 Packet  1 Packet Oral QDAY PRN Alber Junior M.D.        And   • magnesium hydroxide (MILK OF MAGNESIA) suspension 30 mL  30 mL Oral QDAY PRN Alber Junior M.D.        And   • bisacodyl (DULCOLAX) suppository 10 mg  10 mg Rectal QDAY PRN Alber Junior M.D.       • acetaminophen (Tylenol) tablet 650 mg  650 mg Oral Q4HRS PRN Alber Junior M.D.       • ondansetron (ZOFRAN ODT) dispertab 4 mg  4 mg Oral Q4HRS PRN Alber Junior M.D.       • oxyCODONE immediate-release (ROXICODONE) tablet 5 mg  5 mg Oral Q4HRS PRN Alber Junior M.D.   5 mg at 12/14/21 2128    Or   • oxyCODONE immediate release (ROXICODONE) tablet 10 mg  10 mg Oral Q4HRS PRN Alber Junior M.D.   10 mg at 12/17/21 0657   • melatonin tablet 5 mg  5 mg Oral HS PRN - MR X 1 Alison Angulo A.P.R.NJelani   5 mg at 12/16/21 0115   • hydrALAZINE (APRESOLINE) injection 20 mg  20 mg Intravenous Q4HRS PRN Eligio Del Castillo M.D.    20 mg at 12/09/21 0115   • labetalol (NORMODYNE/TRANDATE) injection 10-20 mg  10-20 mg Intravenous Q4HRS PRN Eligio Del Castillo M.D.   20 mg at 12/11/21 0330   • MD Alert...Vancomycin per Pharmacy   Other PHARMACY TO DOSE Eligio Del Castillo M.D.       • cefTRIAXone (Rocephin) 2 g in  mL IVPB  2 g Intravenous Q24HRS Eligio Del Castillo M.D.   Stopped at 12/17/21 0731   • Respiratory Therapy Consult   Nebulization Continuous RT Servando Rush M.D.       • MD Alert...ICU Electrolyte Replacement per Pharmacy   Other PHARMACY TO DOSE Servando Rush M.D.       • Pharmacy Consult Request ...Pain Management Review 1 Each  1 Each Other PHARMACY TO DOSE David Yoder M.D.       • ondansetron (ZOFRAN) syringe/vial injection 4 mg  4 mg Intravenous Q4HRS PRN David Yoder M.D.   4 mg at 12/11/21 2314   • promethazine (PHENERGAN) suppository 12.5-25 mg  12.5-25 mg Rectal Q4HRS PRN David Yoder M.D.           Fluids    Intake/Output Summary (Last 24 hours) at 12/17/2021 1052  Last data filed at 12/17/2021 0400  Gross per 24 hour   Intake 828.03 ml   Output 1665 ml   Net -836.97 ml       Laboratory          Recent Labs     12/15/21  0552 12/16/21  0525 12/17/21  0444   SODIUM 144 145 146*   POTASSIUM 4.1 4.1 3.5*   CHLORIDE 108 109 110   CO2 26 24 26   BUN 21 16 15   CREATININE 0.43* 0.44* 0.56   MAGNESIUM 1.6 1.8 1.7   PHOSPHORUS 3.3 3.4 3.4   CALCIUM 8.4* 8.5 8.3*     Recent Labs     12/15/21  0552 12/16/21  0525 12/17/21  0444   ALTSGPT 43 43 54*   ASTSGOT 30 22 50*   ALKPHOSPHAT 165* 162* 171*   TBILIRUBIN 0.5 0.4 0.5   GLUCOSE 133* 174* 159*     Recent Labs     12/15/21  0552 12/15/21  0940 12/15/21  1330 12/16/21  0525 12/17/21  0444   WBC 15.3*   < > 16.9* 16.6* 15.6*   NEUTSPOLYS 82.70*   < > 92.00* 86.90* 82.60*   LYMPHOCYTES 7.70*   < > 2.50* 4.80* 6.20*   MONOCYTES 5.90   < > 2.70 5.70 6.20   EOSINOPHILS 0.70   < > 0.10 0.00 0.30   BASOPHILS 0.20   < > 0.20 0.40 0.30   ASTSGOT 30  --   --   22 50*   ALTSGPT 43  --   --  43 54*   ALKPHOSPHAT 165*  --   --  162* 171*   TBILIRUBIN 0.5  --   --  0.4 0.5    < > = values in this interval not displayed.     Recent Labs     12/15/21  1330 12/16/21  0525 12/16/21  0930 12/17/21  0444   RBC 3.06* 3.04*  --  3.01*   HEMOGLOBIN 8.2* 8.2*  --  8.1*   HEMATOCRIT 27.9* 27.6*  --  27.5*   PLATELETCT 366 382  --  393   PROTHROMBTM  --   --  13.9  --    APTT  --   --  26.0  --    INR  --   --  1.10  --        Imaging  X-Ray:  I have personally reviewed the images and compared with prior images. and My impression is: Unchanged left lower lobe opacities    Assessment/Plan  * Cardiac arrest (HCC)  Assessment & Plan  S/p PEA, asystole and ventricular tachycardia in ED - precipitated by hemorrhagic shock  ROSC after 7 rounds of CPR, IV epinephrine, IV bicarbonate solution and massive transfusion protocol  ECHO with RV dysfunction and RVSP of 50 mmHg  Hemodynamics improved, weaned off pressors  Neurologically improved, extubated 12/12, diffusely weak  Lidocaine patch for chest wall pain has helped  Reviewed CODE STATUS with patient and her , POLST completed by palliative care, DANR/DNI    Pulmonary hypertension (HCC)  Assessment & Plan  RVSP 50  Monitor for ROSSI  Elevated nicol-arrest?  F/u ECHO and possibly PSG as an outpatient  Forced diuresis as needed, CVP running 13-18, monitoring SBp    Atelectasis  Assessment & Plan  Multifactorial-status post arrest, prolonged course of ventilator, weak and not moving much, obese with episode of pancreatitis  Monitor for the need for therapeutic bronchoscopy, active TX FOB 12/8, follow-up TX FOB with extubation 12/12 with minimal plugs  Mobilize to chair twice daily  Up for meals, patient and RN encouraged strongly  I-S, may need IPPV-reviewed with RT  BIPAP with sleep and naps as needed  RT protocols  Serial chest x-ray as needed    Hypokalemia  Assessment & Plan  Replete potassium, ERP    Paroxysmal atrial fibrillation  (HCC)  Assessment & Plan  Rate controlled, currently in SR  Continue carvedilol as hemodynamics allow  Optimize potassium and magnesium  [id number] score high, 10% risk CVA, will need anticoagulation when/if becomes clinically appropriate  Initiatd heparin drip without bolus 12/13, held 12/15 x 24 hrs for low Bp - Hgb unchanged and resumed 12/16  Start PO Rx next few days if stable    Hypocalcemia  Assessment & Plan  Replete calcium, ERP    Hypomagnesemia  Assessment & Plan  Replete magnesium, ERP    Hemoperitoneum  Assessment & Plan  Surgery has evaluated  No indication for surgery at this time  Trend hemoglobin and transfuse PRBCs to keep hemoglobin greater than 7-no change in greater than 1 week  Unchanged hemoglobin  Status post IVC filter 12/6  Heparin PPx reinitiated 12/10, no bleeding, restarted heparin drip no bolus for PE/PAF 12/13  HGB unchanged, no clinical signs of bleeding-continue to monitor  Reverse with protamine or FFC as needed, hold as needed  Hypotensive episode again 12/15 likely related to BP meds and diuresis,  Still no evidence of recurrent bleeding    Gastrointestinal hemorrhage  Assessment & Plan  History of gastrointestinal hemorrhage with no source identified-no recurrence/monitoring  Possible gastrointestinal hemorrhage provoked by IV heparin for pulmonary embolism - no gross blood noted in stool - positive for occult blood  Continue PPI twice daily  GI consult noted, if rebleeds f/u GI consultation  Trend hemoglobin and transfuse PRBCs to keep hemoglobin greater than 7  Heparin subcu PPx restarted 12/10, trial IV heparin no bolus starting 12/13, consider enteral agent if no bleeding after several days  Status post IVC filter off anticoagulation for A. Fib  HGB unchanged, no clinical signs of bleeding-continue to monitor  Reverse with protamine or FFC as needed, hold s needed  Hypotensive episode 12/16 likely related to BP meds/diuresis-Heparin held for night and hemoglobin unchanged  and no evidence of bleeding, resuming heparin drip without bolus  Hgb no adair back on heparin    DVT (deep venous thrombosis) (HCC)  Assessment & Plan  Imaging reveals LUE DVT associated with her PICC line and a distal LLE DVT - 12/1 & 12/2 studies  Anticoagulation strictly contraindicated due to acute gastrointestinal hemorrhage and hemoperitoneum  IVC filter placement on 12/6  Also needs anticoagulation from a PE & PAF [id number] 2 risk score - 10%  Resuming IV heparin without bolus 12/13  Heparin held overnight 12/15 for hypotensive episode, no bleeding identified as she had on admit, heparin resumed 12/16  Start oral Rx next few days?    Shock (HCC)- (present on admission)  Assessment & Plan  Hemorrhagic shock with acute gastrointestinal blood loss and vasodistributive shock - 12/2 off pressors 12/10  Shock has resolved, weaned off norepinephrine  Hemoglobin grossly unchanged monitoring for hypotension/bleeding since resuming IV heparin    Recurrent shock 12/15-suspect hypovolemia, multiple antihypertensives and IV narcotics  Responded to Nathan-Synephrine pops and norepinephrine infusion along with 1 L saline bolus  Patient -2.1 51L/24 hours, -10+ liters since 12/10  Holding losartan, Cardura, amiloride and carvedilol  DC IV Dilaudid for now  Weaning off norepinephrine now 20 -> 2  Stress dose steroids given in this addisonian patient, wean tomorrow    Hemodynamics improved, now becoming hypertensive again  Resume Coreg at half dose and losartan  Spread out antihypertensive medicines over the course of the day  Reduce dose of IV hydrocortisone supplement for stress - D/c 12/17 after 2 dsoes  Cont Lasix but p.o. 20 mg only    Acute pancreatitis  Assessment & Plan  Resolved likely  She is having bowel movements  She is tolerating enteral tube feedings  She denies N/V/abdominal pain  Continue to monitor    PE (pulmonary thromboembolism) (HCC)- (present on admission)  Assessment & Plan  Anticoagulation was  contraindicated initially  IVC filter placement on 12/6  No change in hemoglobin or significant GI bleeding since heparin stopped, greater than 1 week  No issues with subcu heparin PPx and platelet count has normalized  DC subcu heparin PPx and start heparin without bolus by drip 12/13  Consider transitioning to enteral agent if tolerates IV heparin over the next 72+ hours  Monitoring for bleeding  Heparin 12/15 for hypotensive episode, no bleeding clinically, first hemoglobin trending down but stable thereafter, heparin not reversed  Hemodynamics improved, no bleeding, resume heparin drip no bolus 12/16 - if does ok over 72+ hrs start PO Rx      Debilitated patient  Assessment & Plan  Very complex medical patient now post arrest and 10-11-day ventilator course with severe diffuse weakness needing Neeru lift to transfer to chair  PT/OT to eval and treat  Speech therapy consult, swallow evaluation  Appears to be doing cognitively okay, monitor for need for cognitive eval  Rehab versus LTAC - both consulted  Physiatry consultation noted  Not a candidate for SNU given her complex medical scenario and ongoing treatments as she significantly improves    Acute blood loss anemia- (present on admission)  Assessment & Plan  Suspect gastrointestinal source of blood loss as well as acute hemoperitoneum-no recurrence clinically-monitoring  10 you to trend hemoglobin and transfuse PRBCs to keep hemoglobin greater than 7  Likely no recurrence, hemoglobin unchanged for approximately >7 days  That is post 3 days heparin SQ PPx-> initiate IV heparin without bolus  Continue/resume trial anticoagulation no bleeding on heparin for DVT/PE/PAF - enteral Rx next few days?    Thrombocytopenia (HCC)- (present on admission)  Assessment & Plan  Trend platelet count and thromboelastogram with platelet mapping as needed  Transfuse platelets as required  Count normalized 12/9-remains normal  Still unchanged since restarting  heparin    Hemochromatosis- (present on admission)  Assessment & Plan  History of    Adrenal insufficiency (Freeborn's disease) (HCC)- (present on admission)  Assessment & Plan  Continue hydrocortisone to 20 mg twice daily  Continue Florinef, 0.1 mg twice daily  Monitor for the need to bump hydrocortisone up with stress  Monitoring hemodynamics/electrolytes closely    Bumped up hydrocortisone for stress with hypotension 12/15-now tapering    Elevated LFTs- (present on admission)  Assessment & Plan  Due to ischemic hepatopathy from cardiac arrest and shock  Trend enzymes and synthetic function  Avoid hepatotoxins  Alk phos and transaminases trending down - continue to monitor    MRSA and E. coli sinusitis  Assessment & Plan  On outpatient IV antibiotics - LUE PICC in place - thrombus LUE 12/1 U/s  Continue vancomycin and ceftriaxone-6-week course of antibiotics planned per ID-to complete 12/21 per pharmacy  monitor for secondary infections and antibiotic complications    Acute kidney injury (HCC)- (present on admission)  Assessment & Plan  Continues to slowly improve, creatinine normalized and BUN still coming down  Suspect ATN due to shock in lady with underlying atrophic kidneys  Monitor renal function and urine output  Avoid nephrotoxins and renal dose medications  Serial BMP    Primary hypertension- (present on admission)  Assessment & Plan  Goal SBP less than 160  Hydralazine and labetalol as necessary to achieve blood pressure goals-if frequently required adjust enteral regimen  Hold amiloride, 5 mg daily  Hold doxazosin, 2 mg daily  Resume carvedilol, 25mg twice daily  Resume losartan 100 mg  Asked pharmacy to spread out the BP meds over the course the day and not give him all in the a.m.!  Lasix 20 PO daily    Hypothyroidism- (present on admission)  Assessment & Plan  Continue levothyroxine and liothyronine  Repeat TSH in 4 weeks, sooner if significant arrhythmias occur without obvious etiology  Most recent  TSH borderline low    Obesity (BMI 35.0-39.9 without comorbidity)- (present on admission)  Assessment & Plan  Behavioral modification and weight loss encouraged again  Physical therapy and possible rehab versus LTACH recommended as well  Hypothyroid on repletion therapy by Dr. Jeff  TSH borderline low  Monitor for ROSSI - PAP as needed - seems to do well with HS use  PSG later?    Acute respiratory failure with hypoxia (HCC)- (present on admission)  Assessment & Plan  Intubated 12/2-extubated 12/12  RT protocols  Mobility level 2-3  Aggressive pulmonary toilet, repeat TX FOB as needed  Forced diuresis ongoing - dose with diamox?  Serial chest x-ray as needed  BiPAP for support post extubation as needed, persisting significant ATX  Alternate with HFNC  Heparin for PE when clinically safe-resumed     VTE:  Contraindicated  Ulcer: PPI  Lines: Central Line  Ongoing indication addressed and Perez Catheter  Ongoing indication addressed     I have performed a physical exam and reviewed and updated ROS and Plan today (12/17/2021). In review of yesterday's note (12/16/2021), there are no changes except as documented above.     Discussed patient condition and risk of morbidity and/or mortality with Family, RN, RT, Pharmacy, , Charge nurse / hot rounds, Patient and Palliative care APRN     The patient remains critically ill.  Actively titrating norepinephrine for hypotension.  Patient requiring BiPAP for hypoxemia and hypoventilation.  Alternating with HFNC.  Monitoring for the need to resume BiPAP.    Critical care time = 40 minutes in directly providing and coordinating critical care and extensive data review.  No time overlap and excludes procedures.

## 2021-12-17 NOTE — THERAPY
"Occupational Therapy  Daily Treatment     Patient Name: Donna Isaac  Age:  56 y.o., Sex:  female  Medical Record #: [id number]  Today's Date: 12/17/2021     Precautions: Fall Risk  Comments: cardiac arrest in hospital    Assessment    Pt seen for OT tx to include: bed mobility, seated grooming, sit to stands, toileting. Pt initially dizzy EOB; BP stable and resolved quickly. Pt required max A to stand from EOB > FWW. Cues for motor planning, body mechanics. Required platform of bed to be elevated to achieve full stand, then able to hold for up to 30 seconds. Unable to weight shift adequately for pivot transfer. Incontinent of stool in sitting; total A for hygiene. Spouse Colin present and very supportive (hands-on helper). Pt demos slightly improved functional mobility and ADL this session. Exhibits good motivation. Continues to be limited by: generalized weakness, balance impairment, activity intolerance. Will continue to follow.     Plan    Continue current treatment plan.    DC Equipment Recommendations: Unable to determine at this time  Discharge Recommendations: Recommend post-acute placement for additional occupational therapy services prior to discharge home    Subjective    \"I feel a little stronger\"     Objective       12/17/21 1208   Pain   Pain Scales 0 to 10 Scale    Cognition    Cognition / Consciousness WDL   Level of Consciousness Alert   Comments Improved affect since last session; expresses motivation and made good effort    Balance   Sitting Balance (Static) Fair   Sitting Balance (Dynamic) Fair -   Standing Balance (Static) Trace +   Standing Balance (Dynamic) Trace   Weight Shift Sitting Fair   Weight Shift Standing Poor   Comments FWW and 2-person A for standing    Bed Mobility    Sit to Supine Moderate Assist   Scooting Moderate Assist  (seated, laterally to L )   Comments sternal pain following EOB > supine    Activities of Daily Living   Grooming Supervision;Seated  (hair brushing, " standard oral care )   Lower Body Dressing Total Assist  (sock management )   Toileting Total Assist  (incontinent of BM; Perez in place )   Functional Mobility   Sit to Stand Maximal Assist   Bed, Chair, Wheelchair Transfer Unable to Participate   Mobility Sit to stands, EOB > supine    Comments trialed push to stand (from mattress) and pull on stabilized FWW   Activity Tolerance   Sitting Edge of Bed 20   Standing 4 x 15-30 seconds    Short Term Goals   Short Term Goal # 1 Pt will complete ADL transfers with min A   Goal Outcome # 1 Progressing slower than expected   Short Term Goal # 2 Pt will complete gown change with supv    Goal Outcome # 2   (NT this session )   Short Term Goal # 3 Pt will complete seated grooming with supv    Goal Outcome # 3 Progressing as expected

## 2021-12-17 NOTE — THERAPY
"Speech Language Pathology  Daily Treatment     Patient Name: Donna Isaac  Age:  56 y.o., Sex:  female  Medical Record #: [id number]  Today's Date: 12/17/2021     Precautions  Precautions: (P) Fall Risk,Swallow Precautions ( See Comments)  Comments: cardiac arrest in hospital    Assessment    Patient was seen for dysphagia tx with breakfast meal of Soft & Bite Sized solids (SB6) and Thin Liquids (TN0) as well as tx trials of Regular Solids (RG7). Pt performed oral care with set-up A prior to the meal. Pt was repositioned upright in bed and able to feed self. Min verbal cues provided for pt to reduce rate/size of liquid intake as she had an immediate weak cough response to her first consecutive sips of thins from a cup. Once she took smaller, single sips, no further s/sx of aspiration occurred and there were no changes to respiratory status or vitals. No s/sx of aspiration occurred with solids and pt appropriately took small bites. Pt was able to verbalize her safe swallow precautions accurately at the end of the session.    Recommendations:  1. Upgrade diet to Regular Solids (RG7) and Thin Liquids (TN0)    -Monitor during meals   -Pills whole one at a time w/ thins   -Sit upright for all PO intake, small bites/sips, slow rate   -HOLD PO with any changes to respiratory status  2. Oral care 3x/day to mitigate risk of aspiration PNA.    Plan    Continue current treatment plan.    Discharge Recommendations: (P) Anticipate that the patient will have no further speech therapy needs after discharge from the hospital       Objective       12/17/21 0900   Dysphagia    Positioning / Behavior Modification Modulate Rate or Bite Size;Self Monitoring   Other Treatments meal tx SB6, TN0, tx trials RG7   Diet / Liquid Recommendation Regular (7);Thin (0)   Recommended Route of Medication Administration   Medication Administration  Whole with Liquid Wash   Patient / Family Goals   Patient / Family Goal #1 \"pain meds\"   Goal #1 " Outcome Progressing as expected   Short Term Goals   Short Term Goal # 1 B  Patient will consume meals of soft/bite sized solids and thin liquids with no s/sx of aspiration given direct meal supervision.   Goal Outcome  # 1 B Goal met   Short Term Goal # 2 12/17: Patient will consume meals of regular solids and thin liquids with no s/sx of aspiration given monitoring during meals.

## 2021-12-18 LAB
ALBUMIN SERPL BCP-MCNC: 2.3 G/DL (ref 3.2–4.9)
ALBUMIN/GLOB SERPL: 0.8 G/DL
ALP SERPL-CCNC: 167 U/L (ref 30–99)
ALT SERPL-CCNC: 46 U/L (ref 2–50)
ANION GAP SERPL CALC-SCNC: 7 MMOL/L (ref 7–16)
AST SERPL-CCNC: 29 U/L (ref 12–45)
BASOPHILS # BLD AUTO: 0.2 % (ref 0–1.8)
BASOPHILS # BLD: 0.03 K/UL (ref 0–0.12)
BILIRUB SERPL-MCNC: 0.4 MG/DL (ref 0.1–1.5)
BUN SERPL-MCNC: 11 MG/DL (ref 8–22)
CALCIUM SERPL-MCNC: 8.2 MG/DL (ref 8.5–10.5)
CHLORIDE SERPL-SCNC: 112 MMOL/L (ref 96–112)
CO2 SERPL-SCNC: 27 MMOL/L (ref 20–33)
CREAT SERPL-MCNC: 0.47 MG/DL (ref 0.5–1.4)
EOSINOPHIL # BLD AUTO: 0.04 K/UL (ref 0–0.51)
EOSINOPHIL NFR BLD: 0.3 % (ref 0–6.9)
ERYTHROCYTE [DISTWIDTH] IN BLOOD BY AUTOMATED COUNT: 74.1 FL (ref 35.9–50)
GLOBULIN SER CALC-MCNC: 2.9 G/DL (ref 1.9–3.5)
GLUCOSE BLD-MCNC: 105 MG/DL (ref 65–99)
GLUCOSE BLD-MCNC: 141 MG/DL (ref 65–99)
GLUCOSE BLD-MCNC: 172 MG/DL (ref 65–99)
GLUCOSE BLD-MCNC: 229 MG/DL (ref 65–99)
GLUCOSE SERPL-MCNC: 125 MG/DL (ref 65–99)
HCT VFR BLD AUTO: 27.5 % (ref 37–47)
HGB BLD-MCNC: 8 G/DL (ref 12–16)
IMM GRANULOCYTES # BLD AUTO: 0.64 K/UL (ref 0–0.11)
IMM GRANULOCYTES NFR BLD AUTO: 4.4 % (ref 0–0.9)
LYMPHOCYTES # BLD AUTO: 1.27 K/UL (ref 1–4.8)
LYMPHOCYTES NFR BLD: 8.7 % (ref 22–41)
MAGNESIUM SERPL-MCNC: 1.8 MG/DL (ref 1.5–2.5)
MCH RBC QN AUTO: 26.8 PG (ref 27–33)
MCHC RBC AUTO-ENTMCNC: 29.1 G/DL (ref 33.6–35)
MCV RBC AUTO: 92 FL (ref 81.4–97.8)
MONOCYTES # BLD AUTO: 0.92 K/UL (ref 0–0.85)
MONOCYTES NFR BLD AUTO: 6.3 % (ref 0–13.4)
NEUTROPHILS # BLD AUTO: 11.75 K/UL (ref 2–7.15)
NEUTROPHILS NFR BLD: 80.1 % (ref 44–72)
NRBC # BLD AUTO: 0.29 K/UL
NRBC BLD-RTO: 2 /100 WBC
PHOSPHATE SERPL-MCNC: 3.2 MG/DL (ref 2.5–4.5)
PLATELET # BLD AUTO: 366 K/UL (ref 164–446)
PMV BLD AUTO: 10.7 FL (ref 9–12.9)
POTASSIUM SERPL-SCNC: 3.6 MMOL/L (ref 3.6–5.5)
PROT SERPL-MCNC: 5.2 G/DL (ref 6–8.2)
RBC # BLD AUTO: 2.99 M/UL (ref 4.2–5.4)
SODIUM SERPL-SCNC: 146 MMOL/L (ref 135–145)
UFH PPP CHRO-ACNC: 0.59 IU/ML
WBC # BLD AUTO: 14.7 K/UL (ref 4.8–10.8)

## 2021-12-18 PROCEDURE — 85025 COMPLETE CBC W/AUTO DIFF WBC: CPT

## 2021-12-18 PROCEDURE — 94660 CPAP INITIATION&MGMT: CPT

## 2021-12-18 PROCEDURE — 82962 GLUCOSE BLOOD TEST: CPT | Mod: 91

## 2021-12-18 PROCEDURE — 700101 HCHG RX REV CODE 250: Performed by: INTERNAL MEDICINE

## 2021-12-18 PROCEDURE — 700105 HCHG RX REV CODE 258: Performed by: INTERNAL MEDICINE

## 2021-12-18 PROCEDURE — 84100 ASSAY OF PHOSPHORUS: CPT

## 2021-12-18 PROCEDURE — 770022 HCHG ROOM/CARE - ICU (200)

## 2021-12-18 PROCEDURE — 700102 HCHG RX REV CODE 250 W/ 637 OVERRIDE(OP): Performed by: INTERNAL MEDICINE

## 2021-12-18 PROCEDURE — 80053 COMPREHEN METABOLIC PANEL: CPT

## 2021-12-18 PROCEDURE — A9270 NON-COVERED ITEM OR SERVICE: HCPCS | Performed by: INTERNAL MEDICINE

## 2021-12-18 PROCEDURE — 700111 HCHG RX REV CODE 636 W/ 250 OVERRIDE (IP): Performed by: INTERNAL MEDICINE

## 2021-12-18 PROCEDURE — 99291 CRITICAL CARE FIRST HOUR: CPT | Performed by: INTERNAL MEDICINE

## 2021-12-18 PROCEDURE — 94669 MECHANICAL CHEST WALL OSCILL: CPT

## 2021-12-18 PROCEDURE — 94640 AIRWAY INHALATION TREATMENT: CPT

## 2021-12-18 PROCEDURE — 83735 ASSAY OF MAGNESIUM: CPT

## 2021-12-18 RX ORDER — MAGNESIUM SULFATE HEPTAHYDRATE 40 MG/ML
2 INJECTION, SOLUTION INTRAVENOUS ONCE
Status: COMPLETED | OUTPATIENT
Start: 2021-12-18 | End: 2021-12-18

## 2021-12-18 RX ORDER — POTASSIUM CHLORIDE 20 MEQ/1
60 TABLET, EXTENDED RELEASE ORAL ONCE
Status: COMPLETED | OUTPATIENT
Start: 2021-12-18 | End: 2021-12-18

## 2021-12-18 RX ADMIN — GABAPENTIN 200 MG: 100 CAPSULE ORAL at 21:08

## 2021-12-18 RX ADMIN — HEPARIN SODIUM 20 UNITS/KG/HR: 5000 INJECTION, SOLUTION INTRAVENOUS at 01:57

## 2021-12-18 RX ADMIN — HYDROCORTISONE 10 MG: 20 TABLET ORAL at 18:05

## 2021-12-18 RX ADMIN — VANCOMYCIN HYDROCHLORIDE 1250 MG: 5 INJECTION, POWDER, LYOPHILIZED, FOR SOLUTION INTRAVENOUS at 18:13

## 2021-12-18 RX ADMIN — OXYCODONE HYDROCHLORIDE 10 MG: 10 TABLET ORAL at 18:05

## 2021-12-18 RX ADMIN — INSULIN HUMAN 5 UNITS: 100 INJECTION, SUSPENSION SUBCUTANEOUS at 18:08

## 2021-12-18 RX ADMIN — INSULIN HUMAN 3 UNITS: 100 INJECTION, SOLUTION PARENTERAL at 21:09

## 2021-12-18 RX ADMIN — DEXAMETHASONE 0.5 MG: 1 TABLET ORAL at 18:06

## 2021-12-18 RX ADMIN — ENOXAPARIN SODIUM 100 MG: 100 INJECTION SUBCUTANEOUS at 18:13

## 2021-12-18 RX ADMIN — FUROSEMIDE 20 MG: 20 TABLET ORAL at 05:16

## 2021-12-18 RX ADMIN — OXYCODONE HYDROCHLORIDE 10 MG: 10 TABLET ORAL at 22:46

## 2021-12-18 RX ADMIN — LIDOCAINE 1 PATCH: 50 PATCH TOPICAL at 08:21

## 2021-12-18 RX ADMIN — FLUDROCORTISONE ACETATE 0.1 MG: 0.1 TABLET ORAL at 05:17

## 2021-12-18 RX ADMIN — OMEPRAZOLE 40 MG: 20 CAPSULE, DELAYED RELEASE ORAL at 05:16

## 2021-12-18 RX ADMIN — DEXAMETHASONE 0.5 MG: 1 TABLET ORAL at 05:22

## 2021-12-18 RX ADMIN — OXYCODONE HYDROCHLORIDE 10 MG: 10 TABLET ORAL at 13:32

## 2021-12-18 RX ADMIN — GABAPENTIN 200 MG: 100 CAPSULE ORAL at 13:32

## 2021-12-18 RX ADMIN — INSULIN HUMAN 5 UNITS: 100 INJECTION, SUSPENSION SUBCUTANEOUS at 05:31

## 2021-12-18 RX ADMIN — MAGNESIUM SULFATE HEPTAHYDRATE 2 G: 40 INJECTION, SOLUTION INTRAVENOUS at 08:00

## 2021-12-18 RX ADMIN — GABAPENTIN 200 MG: 100 CAPSULE ORAL at 05:16

## 2021-12-18 RX ADMIN — CARVEDILOL 25 MG: 25 TABLET, FILM COATED ORAL at 18:05

## 2021-12-18 RX ADMIN — LIOTHYRONINE SODIUM 25 MCG: 25 TABLET ORAL at 06:45

## 2021-12-18 RX ADMIN — INSULIN HUMAN 4 UNITS: 100 INJECTION, SOLUTION PARENTERAL at 11:34

## 2021-12-18 RX ADMIN — FLUDROCORTISONE ACETATE 0.1 MG: 0.1 TABLET ORAL at 18:05

## 2021-12-18 RX ADMIN — POTASSIUM CHLORIDE 60 MEQ: 1500 TABLET, EXTENDED RELEASE ORAL at 08:00

## 2021-12-18 RX ADMIN — ENOXAPARIN SODIUM 100 MG: 100 INJECTION SUBCUTANEOUS at 09:30

## 2021-12-18 RX ADMIN — CEFTRIAXONE SODIUM 2 G: 2 INJECTION, POWDER, FOR SOLUTION INTRAMUSCULAR; INTRAVENOUS at 05:17

## 2021-12-18 RX ADMIN — HYDROCORTISONE 20 MG: 20 TABLET ORAL at 05:16

## 2021-12-18 RX ADMIN — CARVEDILOL 25 MG: 25 TABLET, FILM COATED ORAL at 06:45

## 2021-12-18 RX ADMIN — INSULIN HUMAN 3 UNITS: 100 INJECTION, SOLUTION PARENTERAL at 18:09

## 2021-12-18 RX ADMIN — LABETALOL HYDROCHLORIDE 10 MG: 5 INJECTION, SOLUTION INTRAVENOUS at 04:07

## 2021-12-18 RX ADMIN — OXYCODONE HYDROCHLORIDE 10 MG: 10 TABLET ORAL at 08:00

## 2021-12-18 RX ADMIN — OXYCODONE HYDROCHLORIDE 10 MG: 10 TABLET ORAL at 02:07

## 2021-12-18 RX ADMIN — LEVOTHYROXINE SODIUM 75 MCG: 0.07 TABLET ORAL at 05:16

## 2021-12-18 ASSESSMENT — PAIN DESCRIPTION - PAIN TYPE
TYPE: ACUTE PAIN

## 2021-12-18 ASSESSMENT — ENCOUNTER SYMPTOMS
NERVOUS/ANXIOUS: 0
VOMITING: 0
DIZZINESS: 0
BRUISES/BLEEDS EASILY: 0
HEADACHES: 0
SHORTNESS OF BREATH: 0
WEAKNESS: 1
BLOOD IN STOOL: 0
SPUTUM PRODUCTION: 0
NAUSEA: 0
PALPITATIONS: 0
FOCAL WEAKNESS: 0
EYES NEGATIVE: 1
FLANK PAIN: 0
ABDOMINAL PAIN: 0
COUGH: 0
HEMOPTYSIS: 0

## 2021-12-18 ASSESSMENT — FIBROSIS 4 INDEX: FIB4 SCORE: 0.99

## 2021-12-18 NOTE — PROGRESS NOTES
Critical Care Progress Note    Date of admission  12/1/2021    Chief Complaint  56 y.o. female admitted 12/1/2021 with acute pancreatitis, pulmonary embolism.  She has a history of prior gastrointestinal bleeding from unknown source, MRSA sinusitis on outpatient antibiotics, [place]'s disease, GERD, primary hypertension, hemochromatosis, hypothyroidism, traumatic brain injury, myocardial infarction and fibromyalgia.    Hospital Course    12/2 -    vent day 1.  Trend hemoglobin and platelet count and transfuse blood products as required.  Full vent support.  Inhaled Flolan for RV dysfunction following cardiac arrest.  12/3 -    vent day 2.  Start bicarbonate drip for MARY LOU.  Titrating norepinephrine.  Wean off Flolan.  12/4 -    vent day 3.  Renal function worse.  Titrating norepinephrine.  12/5 -    vent day 4.  Blood pressure increased.  Change dexmedetomidine to propofol.  12/6 -    vent day 5.  Arrange for IVC filter placement.  Nephrology on board.  12/7 -    vent day 6.  IVC filter placed yesterday.  Renal function improving.  Taper hydrocortisone.  12/8 -    vent day 7.  Improving renal function.  Taper hydrocortisone.  12/9 -    vent day 8.  Start NPH.  Change propofol to dexmedetomidine.  12/10 - V#9, titrating norepinephrine-off, dexmedetomidine, FiO2 increased to 0.6, start lasix  12/11 - V#10, more alert, off pressors, SAT/SBT ongoing, FiO2 weaned to 0.4  12/12 -  V#11, SAT/SBT ongoing, poor weans yesterday-no change today but R SBI normal, O2 30%, neg 3.9L, TX FOB without significant secretions, liberation trial  12/13 -  Extubated yesterday, borderline sats 6L nasal cannula, very difficult to mobilize  12/14 - BiPAP overnight, 12/6-60% tolerating HFNC today, passed swallow eval, no bleeding on heparin drip  12/15 -dropped BP into the 40s at home rounds and we resuscitated her at the bedside for 1/2-hour, NE up to 20 now back to 2, 1 L fluid bolus, hemoglobin unchanged although heparin stopped initially,  Solu-Cortef 100 mg stress dose given  12/16 - hypotension resolved, off NE, HFNC 50/60, WOB low, no bleeding mentating normal, patient completed POLST and change CODE STATUS to DNAR/DNI - reviewed with   12/17 - BP borderline high, resuming full dose Coreg, HFNC 40/50, no bleeding on heparin drip, tapering IV HC off today  12/18 - BP a bit higher, HFNC 40/50, no bleeding on heparin drip, off IV hydrocortisone    Interval Problem Update  Reviewed last 24 hour events:    A&O x4  SR 80s  -160s  UO good, I/O- 216 cc  No ectopy  HFNC 40/50  BiPAP sleep  Finally doing IPV  Work of breathing low  Weak cough and low I-S  T-max 99.1, WBC slight better 14.7  Hemoglobin 8.0, no bleeding clinically  Platelet count 366 unchanged  Sodium no change 146  Normal renal function and LFTs  Taking p.o.  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       YESTERDAY  A&O x4  SR 90s  -140s  UO good, I/Os  Neg 567  HFNC 40/50 - 91-94% sat  BiPAP 12/6 60% - sleep  /PEP - IPV  WOB low  TM 99.7  WBC 15.6 slowly trending down  Vanco/ceftriaxone-per ID through 12/21  K 3.5, mag 1.7  PPI  Hemoglobin 8.1-no change-no bleeding clinically  Heparin drip  LFTs slightly elevated  Glucose levels running 160-180  Hydrocortisone tapering, back on home Valentín's disease regimen  Lasix  Coreg/losartan  Holding amiloride/Cardura      Increase BB dose back to normal  Hold on amiloride and Cardura this a.m. anyway  Mobilize/IS/PEP/IPPV  Rehab versus LTAC consult pending  Replete K/mag  May need higher dose lasix?  Wean off HC, cont Addisons regimen      Review of  Systems  Review of Systems   Constitutional: Positive for malaise/fatigue (Bit better each day).   HENT: Negative for nosebleeds.    Eyes: Negative.    Respiratory: Negative for cough, hemoptysis, sputum production and shortness of breath.    Cardiovascular: Positive for leg swelling (Improved). Negative for chest pain and palpitations.   Gastrointestinal: Negative for abdominal pain (Improved), blood in stool, nausea and vomiting.   Genitourinary: Negative for flank pain, hematuria and urgency.   Neurological: Positive for weakness (Diffuse, no change). Negative for dizziness, focal weakness and headaches.   Endo/Heme/Allergies: Does not bruise/bleed easily.   Psychiatric/Behavioral: The patient is not nervous/anxious.        Vital Signs for last 24 hours   Temp:  [36.3 °C (97.3 °F)-36.7 °C (98.1 °F)] 36.6 °C (97.9 °F)  Pulse:  [] 83  Resp:  [11-35] 17  BP: ()/() 164/80  SpO2:  [87 %-97 %] 89 %    Hemodynamic parameters for last 24 hours  CVP:  [12 MM HG-263 MM HG] 205 MM HG    Respiratory Information for the last 24 hours       Physical Exam   Physical Exam  Vitals reviewed.   Constitutional:       Appearance: She is obese. She is not ill-appearing (More chronic than acute at this time), toxic-appearing or diaphoretic.      Interventions: She is not intubated.     Comments: [facility]   HENT:      Head: Normocephalic and atraumatic.      Nose: Nose normal.      Mouth/Throat:      Mouth: Mucous membranes are moist.      Pharynx: Oropharynx is clear.   Eyes:      General: No scleral icterus.     Conjunctiva/sclera: Conjunctivae normal.      Pupils: Pupils are equal, round, and reactive to light.   Neck:      Comments: L IJ CVC  Cardiovascular:      Rate and Rhythm: Normal rate and regular rhythm.      Pulses: Normal pulses.      Heart sounds: No murmur heard.  No gallop.       Comments: ST  Pulmonary:      Effort: Pulmonary effort is normal. No accessory muscle usage. She is not intubated.      Breath  sounds: Examination of the right-lower field reveals decreased breath sounds. Examination of the left-lower field reveals decreased breath sounds. Decreased breath sounds present. No wheezing, rhonchi or rales (Unchanged).      Comments: WOB okay  Abdominal:      General: Abdomen is protuberant. Bowel sounds are normal. There is no distension.      Palpations: Abdomen is soft. There is no fluid wave.      Tenderness: There is no abdominal tenderness. There is no right CVA tenderness, left CVA tenderness or guarding. Negative signs include Storm's sign.   Genitourinary:     Comments: Perez  Musculoskeletal:      Cervical back: Normal range of motion.      Right lower leg: Edema (No change) present.      Left lower leg: Edema (No change) present.      Comments: Left arm PICC   Skin:     General: Skin is warm and dry.      Capillary Refill: Capillary refill takes less than 2 seconds.      Coloration: Skin is not cyanotic or mottled.      Nails: There is no clubbing.   Neurological:      General: No focal deficit present.      Mental Status: She is oriented to person, place, and time. She is lethargic.      GCS: GCS eye subscore is 4. GCS verbal subscore is 5. GCS motor subscore is 6.      Cranial Nerves: Cranial nerves are intact. No cranial nerve deficit.      Sensory: No sensory deficit.      Motor: Weakness (No change) present.      Comments: Follows, back to baseline neurologically, patient is cognitively intact and fully competent to make decisions   Psychiatric:         Mood and Affect: Mood normal. Mood is not anxious.         Behavior: Behavior normal. Behavior is not agitated. Behavior is cooperative.         Thought Content: Thought content normal.         Medications  Current Facility-Administered Medications   Medication Dose Route Frequency Provider Last Rate Last Admin   • enoxaparin (LOVENOX) inj 100 mg  100 mg Subcutaneous Q12HRS Alber Junior M.D.   100 mg at 12/18/21 0930   • carvedilol (COREG)  tablet 25 mg  25 mg Oral BID WITH MEALS Alber Junior M.D.   25 mg at 12/18/21 0645   • furosemide (LASIX) tablet 20 mg  20 mg Oral Q DAY Alber Junior M.D.   20 mg at 12/18/21 0516   • losartan (COZAAR) tablet 100 mg  100 mg Oral Q DAY Alber Junior M.D.   100 mg at 12/16/21 1210   • vancomycin (VANCOCIN) 1,250 mg in  mL IVPB  1,250 mg Intravenous Q EVENING Alber Junior M.D.   Stopped at 12/17/21 2030   • insulin NPH (HumuLIN N,NovoLIN N) injection  5 Units Subcutaneous BID Alber Junior M.D.   5 Units at 12/18/21 0531   • lidocaine (LIDODERM) 5 % 1 Patch  1 Patch Transdermal Q24HR Alber Junior M.D.   1 Patch at 12/18/21 0821   • insulin regular (HumuLIN R,NovoLIN R) injection  3-14 Units Subcutaneous 4X/DAY ACHS Alber Junior M.D.   3 Units at 12/17/21 2113    And   • dextrose 50% (D50W) injection 50 mL  50 mL Intravenous Q15 MIN PRN Alber Junior M.D.       • [Held by provider] AMILoride (MIDAMOR) tablet 5 mg  5 mg Oral Q DAY Alber Junior M.D.   5 mg at 12/15/21 0517   • dexamethasone (DECADRON) tablet 0.5 mg  0.5 mg Oral Q12HRS Alber Jnuior M.D.   0.5 mg at 12/18/21 0522   • doxazosin (CARDURA) tablet 2 mg  2 mg Oral DAILY Alber Junior M.D.   2 mg at 12/15/21 0519   • fludrocortisone (FLORINEF) tablet 0.1 mg  0.1 mg Oral BID Alber Junior M.D.   0.1 mg at 12/18/21 0517   • gabapentin (NEURONTIN) capsule 200 mg  200 mg Oral Q8HRS Alber Junior M.D.   200 mg at 12/18/21 0516   • hydrocortisone (CORTEF) tablet 20 mg  20 mg Oral DAILY Alber Junior M.D.   20 mg at 12/18/21 0516    And   • hydrocortisone (CORTEF) tablet 10 mg  10 mg Oral Nightly Alber Junior M.D.   10 mg at 12/17/21 1730   • levothyroxine (SYNTHROID) tablet 75 mcg  75 mcg Oral AM ES Alber Junior M.D.   75 mcg at 12/18/21 0516   • liothyronine (CYTOMEL) tablet 25 mcg  25 mcg Oral QAM  Alber Junior M.D.   25 mcg at 12/18/21 0645   • omeprazole  (PRILOSEC) capsule 40 mg  40 mg Oral DAILY Alber Junior M.D.   40 mg at 12/18/21 0516   • senna-docusate (PERICOLACE or SENOKOT S) 8.6-50 MG per tablet 2 Tablet  2 Tablet Oral BID Alber Junior M.D.   2 Tablet at 12/17/21 0652    And   • polyethylene glycol/lytes (MIRALAX) PACKET 1 Packet  1 Packet Oral QDAY PRN Alber Junior M.D.        And   • magnesium hydroxide (MILK OF MAGNESIA) suspension 30 mL  30 mL Oral QDAY PRN Alber Junior M.D.        And   • bisacodyl (DULCOLAX) suppository 10 mg  10 mg Rectal QDAY PRN Alber Junior M.D.       • acetaminophen (Tylenol) tablet 650 mg  650 mg Oral Q4HRS PRN Alber Junior M.D.       • ondansetron (ZOFRAN ODT) dispertab 4 mg  4 mg Oral Q4HRS PRN Alber Junior M.D.       • oxyCODONE immediate-release (ROXICODONE) tablet 5 mg  5 mg Oral Q4HRS PRN Alber Junior M.D.   5 mg at 12/14/21 2128    Or   • oxyCODONE immediate release (ROXICODONE) tablet 10 mg  10 mg Oral Q4HRS PRN Alber Junior M.D.   10 mg at 12/18/21 0800   • melatonin tablet 5 mg  5 mg Oral HS PRN - MR X 1 KONSTANTIN ReillyPJelaniRJelaniNJelani   5 mg at 12/16/21 0115   • hydrALAZINE (APRESOLINE) injection 20 mg  20 mg Intravenous Q4HRS PRN Eligio Del Castillo M.D.   20 mg at 12/09/21 0115   • labetalol (NORMODYNE/TRANDATE) injection 10-20 mg  10-20 mg Intravenous Q4HRS PRN Eligio Del Castillo M.D.   10 mg at 12/18/21 0407   • MD Alert...Vancomycin per Pharmacy   Other PHARMACY TO DOSE Eligio Del Castillo M.D.       • cefTRIAXone (Rocephin) 2 g in  mL IVPB  2 g Intravenous Q24HRS Eligio Del Castillo M.D.   Stopped at 12/18/21 0547   • Respiratory Therapy Consult   Nebulization Continuous RT Servando Rush M.D.       • MD Alert...ICU Electrolyte Replacement per Pharmacy   Other PHARMACY TO DOSE Servando Rush M.D.       • Pharmacy Consult Request ...Pain Management Review 1 Each  1 Each Other PHARMACY TO DOSE David Yoder M.D.       • ondansetron (ZOFRAN)  syringe/vial injection 4 mg  4 mg Intravenous Q4HRS PRN David Yoder M.D.   4 mg at 12/11/21 2314   • promethazine (PHENERGAN) suppository 12.5-25 mg  12.5-25 mg Rectal Q4HRS PRN David Yoder M.D.           Fluids    Intake/Output Summary (Last 24 hours) at 12/18/2021 1114  Last data filed at 12/18/2021 0600  Gross per 24 hour   Intake 1484.54 ml   Output 1700 ml   Net -215.46 ml       Laboratory          Recent Labs     12/16/21  0525 12/17/21 0444 12/18/21 0217   SODIUM 145 146* 146*   POTASSIUM 4.1 3.5* 3.6   CHLORIDE 109 110 112   CO2 24 26 27   BUN 16 15 11   CREATININE 0.44* 0.56 0.47*   MAGNESIUM 1.8 1.7 1.8   PHOSPHORUS 3.4 3.4 3.2   CALCIUM 8.5 8.3* 8.2*     Recent Labs     12/16/21 0525 12/17/21 0444 12/18/21 0217   ALTSGPT 43 54* 46   ASTSGOT 22 50* 29   ALKPHOSPHAT 162* 171* 167*   TBILIRUBIN 0.4 0.5 0.4   GLUCOSE 174* 159* 125*     Recent Labs     12/16/21  0525 12/17/21 0444 12/18/21 0217   WBC 16.6* 15.6* 14.7*   NEUTSPOLYS 86.90* 82.60* 80.10*   LYMPHOCYTES 4.80* 6.20* 8.70*   MONOCYTES 5.70 6.20 6.30   EOSINOPHILS 0.00 0.30 0.30   BASOPHILS 0.40 0.30 0.20   ASTSGOT 22 50* 29   ALTSGPT 43 54* 46   ALKPHOSPHAT 162* 171* 167*   TBILIRUBIN 0.4 0.5 0.4     Recent Labs     12/16/21  0525 12/16/21  0930 12/17/21 0444 12/18/21 0217   RBC 3.04*  --  3.01* 2.99*   HEMOGLOBIN 8.2*  --  8.1* 8.0*   HEMATOCRIT 27.6*  --  27.5* 27.5*   PLATELETCT 382  --  393 366   PROTHROMBTM  --  13.9  --   --    APTT  --  26.0  --   --    INR  --  1.10  --   --        Imaging  X-Ray:  I have personally reviewed the images and compared with prior images. and My impression is: Unchanged left lower lobe opacities    Assessment/Plan  * Cardiac arrest (HCC)  Assessment & Plan  S/p PEA, asystole and ventricular tachycardia in ED - precipitated by hemorrhagic shock  ROSC after 7 rounds of CPR, IV epinephrine, IV bicarbonate solution and massive transfusion protocol  ECHO with RV dysfunction and RVSP of 50  mmHg  Hemodynamics improved, weaned off pressors  Neurologically improved, extubated 12/12, diffusely weak  Lidocaine patch for chest wall pain has helped  Reviewed CODE STATUS with patient and her , POLST completed by palliative care, DANR/DNI    Pulmonary hypertension (HCC)  Assessment & Plan  RVSP 50  Monitor for ROSSI  Elevated nicol-arrest?  F/u ECHO and possibly PSG as an outpatient  Forced diuresis as needed, CVP running 13-18, monitoring SBp  Enteral Lasix with mild net negative fluid balance, continue and reassess daily for the need for higher dose    Atelectasis  Assessment & Plan  Multifactorial-status post arrest, prolonged course of ventilator, weak and not moving much, obese with episode of pancreatitis  Monitor for the need for therapeutic bronchoscopy, active TX FOB 12/8, follow-up TX FOB with extubation 12/12 with minimal plugs  Mobilize to chair twice daily  Up for meals, patient encouraged again, seems motivated now  I-S, as needed IPPV/PEP-reviewed with RT  BIPAP with sleep and naps as needed  RT protocols  Serial chest x-ray as needed    Hypokalemia  Assessment & Plan  Replete potassium, ERP as needed    Paroxysmal atrial fibrillation (HCC)  Assessment & Plan  Rate controlled, currently in SR  Continue carvedilol as hemodynamics allow  Optimize potassium and magnesium  [id number] score high, 10% risk CVA, will need anticoagulation when/if becomes clinically appropriate  Initiatd heparin drip without bolus 12/13, held 12/15 x 24 hrs for low Bp - Hgb unchanged and resumed 12/16  Start therapeutic dose Lovenox and work with pharmacy/case management re: which drug insurance covers warfarin versus NOAC    Hypocalcemia  Assessment & Plan  Replete calcium, ERP as needed    Hypomagnesemia  Assessment & Plan  Replete magnesium, ERP as needed    Hemoperitoneum  Assessment & Plan  Surgery has evaluated  No indication for surgery at this time  Trend hemoglobin and transfuse PRBCs to keep hemoglobin  greater than 7-no change in greater than 1 week  Unchanged hemoglobin  Status post IVC filter 12/6  Heparin PPx reinitiated 12/10, no bleeding, restarted heparin drip no bolus for PE/PAF 12/13, initiate Lovenox 12/18, oral agent soon  HGB unchanged, no clinical signs of bleeding-continue to monitor  Reverse with protamine or FFC as needed, hold as needed  Hypotensive episode again 12/15 likely related to BP meds and diuresis,  Still no evidence of recurrent bleeding, continue to monitor    Gastrointestinal hemorrhage  Assessment & Plan  History of gastrointestinal hemorrhage with no source identified-no recurrence/monitoring  Possible gastrointestinal hemorrhage provoked by IV heparin for pulmonary embolism - no gross blood noted in stool - positive for occult blood  Continue PPI twice daily  GI consult noted, if rebleeds f/u GI consultation  Trend hemoglobin and transfuse PRBCs to keep hemoglobin greater than 7  Heparin subcu PPx restarted 12/10, trial IV heparin no bolus starting 12/13, consider enteral agent if no bleeding after several days  Status post IVC filter off anticoagulation for A. Fib  HGB unchanged, no clinical signs of bleeding-continue to monitor  Reverse with protamine or FFC as needed, hold s needed  Hypotensive episode 12/16 likely related to BP meds/diuresis-Heparin held for night and hemoglobin unchanged and no evidence of bleeding, resuming heparin drip without bolus  Hemoglobin stable  Transitioning from IV heparin to full dose Lovenox, oral therapy soon when insurance/ let us know warfarin versus NOAC    DVT (deep venous thrombosis) (HCC)  Assessment & Plan  Imaging reveals LUE DVT associated with her PICC line and a distal LLE DVT - 12/1 & 12/2 studies  Anticoagulation strictly contraindicated due to acute gastrointestinal hemorrhage and hemoperitoneum  IVC filter placement on 12/6  Also needs anticoagulation from a PE & PAF [id number] 2 risk score - 10%  Resuming IV heparin  without bolus 12/13  Heparin held overnight 12/15 for hypotensive episode, no bleeding identified as she had on admit, heparin resumed 12/16  DC IV heparin, start full dose Lovenox 12/18, transition to oral agent when we know which her insurance will cover    Shock ([facility])- (present on admission)  Assessment & Plan  Hemorrhagic shock with acute gastrointestinal blood loss and vasodistributive shock - 12/2 off pressors 12/10  Shock has resolved, weaned off norepinephrine  Hemoglobin grossly unchanged monitoring for hypotension/bleeding since resuming IV heparin    Recurrent shock 12/15-suspect hypovolemia, multiple antihypertensives and IV narcotics  Responded to Nathan-Synephrine pops and norepinephrine infusion along with 1 L saline bolus  Patient -2.1 51L/24 hours, -10+ liters since 12/10  Holding losartan, Cardura, amiloride and carvedilol  DC IV Dilaudid for now  Weaning off norepinephrine now 20 -> 2  Stress dose steroids given in this addisonian patient, wean tomorrow    Hemodynamics improved, off pressors  Discontinued supplemental IV hydrocortisone 12/17  Cont Lasix but p.o. 20 mg only    Acute pancreatitis  Assessment & Plan  Resolved likely  She is having bowel movements  She is tolerating enteral tube feedings  She denies N/V/abdominal pain  Continue to monitor    PE (pulmonary thromboembolism) ([facility])- (present on admission)  Assessment & Plan  Anticoagulation was contraindicated initially  IVC filter placement on 12/6  No change in hemoglobin or significant GI bleeding since heparin stopped, greater than 1 week  No issues with subcu heparin PPx and platelet count has normalized  DC subcu heparin PPx and start heparin without bolus by drip 12/13  Consider transitioning to enteral agent if tolerates IV heparin over the next 72+ hours  Monitoring for bleeding  Heparin 12/15 for hypotensive episode, no bleeding clinically, first hemoglobin trending down but stable thereafter, heparin not reversed  Hemodynamics  improved & no bleeding  Start Lovenox and work with pharmacy/case management re: which drug insurance covers warfarin versus NOAC      Debilitated patient  Assessment & Plan  Very complex medical patient now post arrest and 10-11-day ventilator course with severe diffuse weakness needing Neeru lift to transfer to chair  PT/OT to eval and treat  Speech therapy consult, swallow evaluation  Appears to be doing cognitively okay, monitor for need for cognitive eval  Rehab versus LTAC - both consulted  Physiatry consultation noted  Not a candidate for SNU given her complex medical scenario and ongoing treatments as she significantly improves    Acute blood loss anemia- (present on admission)  Assessment & Plan  Suspect gastrointestinal source of blood loss as well as acute hemoperitoneum-no recurrence clinically-monitoring  10 you to trend hemoglobin and transfuse PRBCs to keep hemoglobin greater than 7  Likely no recurrence, hemoglobin unchanged for approximately >7 days  That is post 3 days heparin SQ PPx-> initiate IV heparin without bolus  Tolerating IV heparin, transition to full dose Lovenox and oral agent when we know which her insurance will cover:  NOAC versus warfarin    Thrombocytopenia (HCC)- (present on admission)  Assessment & Plan  Trend platelet count and thromboelastogram with platelet mapping as needed  Transfuse platelets as required  Count normalized 12/9-remains normal  Still unchanged since restarting heparin    Hemochromatosis- (present on admission)  Assessment & Plan  History of    Adrenal insufficiency (Valentín's disease) (HCC)- (present on admission)  Assessment & Plan  Continue hydrocortisone to 20 mg twice daily  Continue Florinef, 0.1 mg twice daily  Monitor for the need to bump hydrocortisone up with stress  Monitoring hemodynamics/electrolytes closely  IV hydrocortisone discontinued, back to baseline regimen for Valentín's disease, reassess daily    Elevated LFTs- (present on  admission)  Assessment & Plan  Due to ischemic hepatopathy from cardiac arrest and shock  Trend enzymes and synthetic function  Avoid hepatotoxins  Alk phos and transaminases trending down - continue to monitor    MRSA and E. coli sinusitis  Assessment & Plan  On outpatient IV antibiotics - LUE PICC in place - thrombus LUE 12/1 U/s  Continue vancomycin and ceftriaxone-6-week course of antibiotics planned per ID-to complete 12/21 per pharmacy  monitor for secondary infections and antibiotic complications  Finally fully anticoagulated, consider pulling PICC 12/19    Acute kidney injury (HCC)- (present on admission)  Assessment & Plan  Continues to slowly improve, creatinine normalized and BUN still coming down  Suspect ATN due to shock in lady with underlying atrophic kidneys  Monitor renal function and urine output  Avoid nephrotoxins and renal dose medications  Serial BMP    Primary hypertension- (present on admission)  Assessment & Plan  Goal SBP less than 160  Hydralazine and labetalol as necessary to achieve blood pressure goals-if frequently required adjust enteral regimen  Hold amiloride, 5 mg daily  Resume doxazosin, 2 mg daily  Continue carvedilol, 25mg twice daily  Continue losartan 100 mg  Asked pharmacy to spread out the BP meds over the course the day and not give him all in the a.m.!  Lasix 20 PO daily, May need higher dose    Hypothyroidism- (present on admission)  Assessment & Plan  Continue levothyroxine and liothyronine  Repeat TSH in 4 weeks, sooner if significant arrhythmias occur without obvious etiology  Most recent TSH borderline low, follow-up with endocrinology-she sees Dr. Jeff    Obesity (BMI 35.0-39.9 without comorbidity)- (present on admission)  Assessment & Plan  Behavioral modification and weight loss encouraged again  Physical therapy and possible rehab versus LTACH recommended as well  Hypothyroid on repletion therapy by Dr. Jeff  TSH borderline low  Monitor for ROSSI - PAP  as needed - seems to do well with HS use-continue with BiPAP use sleep/naps  PSG later?  Reassess as outpatient    Acute respiratory failure with hypoxia (HCC)- (present on admission)  Assessment & Plan  Intubated 12/2-extubated 12/12  RT protocols  Mobility level 2-3  Aggressive pulmonary toilet, repeat TX FOB as needed  Forced diuresis ongoing - dose with diamox?  Serial chest x-ray as needed  BiPAP for support post extubation as needed, persisting significant ATX  Alternate with HFNC  Full dose Lovenox, off IV heparin, working with pharmacy/ to see what insurance covers in terms of warfarin versus NOAC     VTE:  Contraindicated  Ulcer: PPI  Lines: Central Line  Ongoing indication addressed and Perez Catheter  Ongoing indication addressed     I have performed a physical exam and reviewed and updated ROS and Plan today (12/18/2021). In review of yesterday's note (12/17/2021), there are no changes except as documented above.     Discussed patient condition and risk of morbidity and/or mortality with RN, RT, Pharmacy, Charge nurse / hot rounds and Patient     The patient remains critically ill.  Off norepinephrine for hypotension.  Patient still requiring BiPAP for hypoxemia and hypoventilation.  Alternating with HFNC.  Monitoring for the need to resume BiPAP.    Critical care time = 35 minutes in directly providing and coordinating critical care and extensive data review.  No time overlap and excludes procedures.

## 2021-12-18 NOTE — CARE PLAN
Problem: Pain - Standard  Goal: Alleviation of pain or a reduction in pain to the patient’s comfort goal  Outcome: Progressing     Problem: Knowledge Deficit - Standard  Goal: Patient and family/care givers will demonstrate understanding of plan of care, disease process/condition, diagnostic tests and medications  Outcome: Progressing   The patient is Watcher - Medium risk of patient condition declining or worsening    Shift Goals  Clinical Goals: Mobility/pain control  Patient Goals: Pain control  Family Goals: NA    Progress made toward(s) clinical / shift goals:      Patient is not progressing towards the following goals:

## 2021-12-19 LAB
ALBUMIN SERPL BCP-MCNC: 2.7 G/DL (ref 3.2–4.9)
ALBUMIN/GLOB SERPL: 1.1 G/DL
ALP SERPL-CCNC: 172 U/L (ref 30–99)
ALT SERPL-CCNC: 54 U/L (ref 2–50)
ANION GAP SERPL CALC-SCNC: 9 MMOL/L (ref 7–16)
ANISOCYTOSIS BLD QL SMEAR: ABNORMAL
AST SERPL-CCNC: 41 U/L (ref 12–45)
BASOPHILS # BLD AUTO: 0 % (ref 0–1.8)
BASOPHILS # BLD: 0 K/UL (ref 0–0.12)
BILIRUB SERPL-MCNC: 0.4 MG/DL (ref 0.1–1.5)
BUN SERPL-MCNC: 8 MG/DL (ref 8–22)
CALCIUM SERPL-MCNC: 8.2 MG/DL (ref 8.5–10.5)
CHLORIDE SERPL-SCNC: 108 MMOL/L (ref 96–112)
CO2 SERPL-SCNC: 24 MMOL/L (ref 20–33)
CREAT SERPL-MCNC: 0.54 MG/DL (ref 0.5–1.4)
EOSINOPHIL # BLD AUTO: 0.14 K/UL (ref 0–0.51)
EOSINOPHIL NFR BLD: 0.9 % (ref 0–6.9)
ERYTHROCYTE [DISTWIDTH] IN BLOOD BY AUTOMATED COUNT: 74.4 FL (ref 35.9–50)
GLOBULIN SER CALC-MCNC: 2.5 G/DL (ref 1.9–3.5)
GLUCOSE BLD-MCNC: 112 MG/DL (ref 65–99)
GLUCOSE BLD-MCNC: 118 MG/DL (ref 65–99)
GLUCOSE BLD-MCNC: 149 MG/DL (ref 65–99)
GLUCOSE BLD-MCNC: 156 MG/DL (ref 65–99)
GLUCOSE BLD-MCNC: 170 MG/DL (ref 65–99)
GLUCOSE BLD-MCNC: 189 MG/DL (ref 65–99)
GLUCOSE SERPL-MCNC: 231 MG/DL (ref 65–99)
HCT VFR BLD AUTO: 28.9 % (ref 37–47)
HGB BLD-MCNC: 8.3 G/DL (ref 12–16)
LYMPHOCYTES # BLD AUTO: 0.66 K/UL (ref 1–4.8)
LYMPHOCYTES NFR BLD: 4.4 % (ref 22–41)
MAGNESIUM SERPL-MCNC: 1.6 MG/DL (ref 1.5–2.5)
MANUAL DIFF BLD: NORMAL
MCH RBC QN AUTO: 26.9 PG (ref 27–33)
MCHC RBC AUTO-ENTMCNC: 28.7 G/DL (ref 33.6–35)
MCV RBC AUTO: 93.5 FL (ref 81.4–97.8)
MICROCYTES BLD QL SMEAR: ABNORMAL
MONOCYTES # BLD AUTO: 0.53 K/UL (ref 0–0.85)
MONOCYTES NFR BLD AUTO: 3.5 % (ref 0–13.4)
MORPHOLOGY BLD-IMP: NORMAL
NEUTROPHILS # BLD AUTO: 13.77 K/UL (ref 2–7.15)
NEUTROPHILS NFR BLD: 91.2 % (ref 44–72)
NRBC # BLD AUTO: 0.23 K/UL
NRBC BLD-RTO: 1.5 /100 WBC
OVALOCYTES BLD QL SMEAR: NORMAL
PHOSPHATE SERPL-MCNC: 3.3 MG/DL (ref 2.5–4.5)
PLATELET # BLD AUTO: 341 K/UL (ref 164–446)
PLATELET BLD QL SMEAR: NORMAL
PMV BLD AUTO: 10.7 FL (ref 9–12.9)
POIKILOCYTOSIS BLD QL SMEAR: NORMAL
POLYCHROMASIA BLD QL SMEAR: NORMAL
POTASSIUM SERPL-SCNC: 3.2 MMOL/L (ref 3.6–5.5)
PROT SERPL-MCNC: 5.2 G/DL (ref 6–8.2)
RBC # BLD AUTO: 3.09 M/UL (ref 4.2–5.4)
RBC BLD AUTO: PRESENT
SODIUM SERPL-SCNC: 141 MMOL/L (ref 135–145)
WBC # BLD AUTO: 15.1 K/UL (ref 4.8–10.8)

## 2021-12-19 PROCEDURE — 83735 ASSAY OF MAGNESIUM: CPT

## 2021-12-19 PROCEDURE — 700101 HCHG RX REV CODE 250: Performed by: INTERNAL MEDICINE

## 2021-12-19 PROCEDURE — 99233 SBSQ HOSP IP/OBS HIGH 50: CPT | Performed by: HOSPITALIST

## 2021-12-19 PROCEDURE — A9270 NON-COVERED ITEM OR SERVICE: HCPCS | Performed by: INTERNAL MEDICINE

## 2021-12-19 PROCEDURE — 700111 HCHG RX REV CODE 636 W/ 250 OVERRIDE (IP): Performed by: INTERNAL MEDICINE

## 2021-12-19 PROCEDURE — 99233 SBSQ HOSP IP/OBS HIGH 50: CPT | Performed by: INTERNAL MEDICINE

## 2021-12-19 PROCEDURE — 700105 HCHG RX REV CODE 258: Performed by: INTERNAL MEDICINE

## 2021-12-19 PROCEDURE — 94669 MECHANICAL CHEST WALL OSCILL: CPT

## 2021-12-19 PROCEDURE — 85027 COMPLETE CBC AUTOMATED: CPT

## 2021-12-19 PROCEDURE — 82962 GLUCOSE BLOOD TEST: CPT

## 2021-12-19 PROCEDURE — 770000 HCHG ROOM/CARE - INTERMEDIATE ICU *

## 2021-12-19 PROCEDURE — 85007 BL SMEAR W/DIFF WBC COUNT: CPT

## 2021-12-19 PROCEDURE — 94660 CPAP INITIATION&MGMT: CPT

## 2021-12-19 PROCEDURE — 80053 COMPREHEN METABOLIC PANEL: CPT

## 2021-12-19 PROCEDURE — 84100 ASSAY OF PHOSPHORUS: CPT

## 2021-12-19 PROCEDURE — 700102 HCHG RX REV CODE 250 W/ 637 OVERRIDE(OP): Performed by: INTERNAL MEDICINE

## 2021-12-19 RX ORDER — MAGNESIUM SULFATE HEPTAHYDRATE 40 MG/ML
2 INJECTION, SOLUTION INTRAVENOUS ONCE
Status: COMPLETED | OUTPATIENT
Start: 2021-12-19 | End: 2021-12-19

## 2021-12-19 RX ORDER — POTASSIUM CHLORIDE 20 MEQ/1
40 TABLET, EXTENDED RELEASE ORAL EVERY 4 HOURS
Status: COMPLETED | OUTPATIENT
Start: 2021-12-19 | End: 2021-12-19

## 2021-12-19 RX ADMIN — ALTEPLASE 2 MG: 2.2 INJECTION, POWDER, LYOPHILIZED, FOR SOLUTION INTRAVENOUS at 10:56

## 2021-12-19 RX ADMIN — INSULIN HUMAN 5 UNITS: 100 INJECTION, SUSPENSION SUBCUTANEOUS at 05:29

## 2021-12-19 RX ADMIN — DEXAMETHASONE 0.5 MG: 1 TABLET ORAL at 05:28

## 2021-12-19 RX ADMIN — FLUDROCORTISONE ACETATE 0.1 MG: 0.1 TABLET ORAL at 17:34

## 2021-12-19 RX ADMIN — INSULIN HUMAN 3 UNITS: 100 INJECTION, SOLUTION PARENTERAL at 21:41

## 2021-12-19 RX ADMIN — CARVEDILOL 25 MG: 25 TABLET, FILM COATED ORAL at 06:34

## 2021-12-19 RX ADMIN — GABAPENTIN 200 MG: 100 CAPSULE ORAL at 15:13

## 2021-12-19 RX ADMIN — POTASSIUM CHLORIDE 40 MEQ: 1500 TABLET, EXTENDED RELEASE ORAL at 10:55

## 2021-12-19 RX ADMIN — POTASSIUM CHLORIDE 40 MEQ: 1500 TABLET, EXTENDED RELEASE ORAL at 07:47

## 2021-12-19 RX ADMIN — HYDROCORTISONE 10 MG: 20 TABLET ORAL at 17:34

## 2021-12-19 RX ADMIN — GABAPENTIN 200 MG: 100 CAPSULE ORAL at 21:40

## 2021-12-19 RX ADMIN — ENOXAPARIN SODIUM 100 MG: 100 INJECTION SUBCUTANEOUS at 05:25

## 2021-12-19 RX ADMIN — LIOTHYRONINE SODIUM 25 MCG: 25 TABLET ORAL at 06:34

## 2021-12-19 RX ADMIN — LOSARTAN POTASSIUM 100 MG: 50 TABLET, FILM COATED ORAL at 12:17

## 2021-12-19 RX ADMIN — DEXAMETHASONE 0.5 MG: 1 TABLET ORAL at 17:33

## 2021-12-19 RX ADMIN — HYDROCORTISONE 20 MG: 20 TABLET ORAL at 05:26

## 2021-12-19 RX ADMIN — CARVEDILOL 25 MG: 25 TABLET, FILM COATED ORAL at 17:34

## 2021-12-19 RX ADMIN — MAGNESIUM SULFATE HEPTAHYDRATE 2 G: 40 INJECTION, SOLUTION INTRAVENOUS at 07:47

## 2021-12-19 RX ADMIN — OXYCODONE HYDROCHLORIDE 10 MG: 10 TABLET ORAL at 08:41

## 2021-12-19 RX ADMIN — CEFTRIAXONE SODIUM 2 G: 2 INJECTION, POWDER, FOR SOLUTION INTRAMUSCULAR; INTRAVENOUS at 05:27

## 2021-12-19 RX ADMIN — OXYCODONE HYDROCHLORIDE 10 MG: 10 TABLET ORAL at 03:01

## 2021-12-19 RX ADMIN — OMEPRAZOLE 40 MG: 20 CAPSULE, DELAYED RELEASE ORAL at 05:27

## 2021-12-19 RX ADMIN — LIDOCAINE 1 PATCH: 50 PATCH TOPICAL at 08:41

## 2021-12-19 RX ADMIN — OXYCODONE HYDROCHLORIDE 10 MG: 10 TABLET ORAL at 22:53

## 2021-12-19 RX ADMIN — DOXAZOSIN 2 MG: 2 TABLET ORAL at 05:26

## 2021-12-19 RX ADMIN — ENOXAPARIN SODIUM 100 MG: 100 INJECTION SUBCUTANEOUS at 17:33

## 2021-12-19 RX ADMIN — OXYCODONE 5 MG: 5 TABLET ORAL at 15:14

## 2021-12-19 RX ADMIN — FUROSEMIDE 20 MG: 20 TABLET ORAL at 05:26

## 2021-12-19 RX ADMIN — LEVOTHYROXINE SODIUM 75 MCG: 0.07 TABLET ORAL at 05:27

## 2021-12-19 RX ADMIN — VANCOMYCIN HYDROCHLORIDE 1250 MG: 5 INJECTION, POWDER, LYOPHILIZED, FOR SOLUTION INTRAVENOUS at 19:50

## 2021-12-19 RX ADMIN — GABAPENTIN 200 MG: 100 CAPSULE ORAL at 05:25

## 2021-12-19 RX ADMIN — FLUDROCORTISONE ACETATE 0.1 MG: 0.1 TABLET ORAL at 05:26

## 2021-12-19 RX ADMIN — INSULIN HUMAN 5 UNITS: 100 INJECTION, SUSPENSION SUBCUTANEOUS at 17:39

## 2021-12-19 ASSESSMENT — ENCOUNTER SYMPTOMS
HEMOPTYSIS: 0
LOSS OF CONSCIOUSNESS: 0
ABDOMINAL PAIN: 1
NERVOUS/ANXIOUS: 0
ABDOMINAL PAIN: 0
FLANK PAIN: 0
VOMITING: 0
DOUBLE VISION: 0
DIARRHEA: 0
EYES NEGATIVE: 1
WEAKNESS: 1
DIZZINESS: 0
BRUISES/BLEEDS EASILY: 0
HEADACHES: 0
SHORTNESS OF BREATH: 0
SORE THROAT: 0
SHORTNESS OF BREATH: 1
FEVER: 0
COUGH: 1
BLOOD IN STOOL: 0
NAUSEA: 0
CHILLS: 0
PALPITATIONS: 0
BLURRED VISION: 0
BACK PAIN: 0
COUGH: 0
FOCAL WEAKNESS: 0
SPUTUM PRODUCTION: 0

## 2021-12-19 ASSESSMENT — PAIN DESCRIPTION - PAIN TYPE
TYPE: ACUTE PAIN

## 2021-12-19 ASSESSMENT — FIBROSIS 4 INDEX: FIB4 SCORE: 0.71

## 2021-12-19 NOTE — PROGRESS NOTES
Received report from Roxie ROBERTSON, patient transferred to [id number]. Patient declines any needs at this time.

## 2021-12-19 NOTE — ASSESSMENT & PLAN NOTE
Intubated due to hemorrhagic shock  Extubated 12/13  Stable on 2 L of oxygen by nasal cannula.  Continue RT protocol  Aspiration precautions  Wean O2 as tolerated

## 2021-12-19 NOTE — ASSESSMENT & PLAN NOTE
Secondary to hemorrhagic shock on 12/2 at ED, CPR was per formed, she was intubated, she was resuscitated with massive transfusion protocol. Patient was extubated on 12/13  Echo 12/2: The left ventricular ejection fraction is visually estimated to be 60%. The right ventricle is dilated. Reduced right ventricular systolic function.

## 2021-12-19 NOTE — PROGRESS NOTES
Critical Care Progress Note    Date of admission  12/1/2021    Chief Complaint  56 y.o. female admitted 12/1/2021 with acute pancreatitis, pulmonary embolism.  She has a history of prior gastrointestinal bleeding from unknown source, MRSA sinusitis on outpatient antibiotics, Freeland's disease, GERD, primary hypertension, hemochromatosis, hypothyroidism, traumatic brain injury, myocardial infarction and fibromyalgia.    Hospital Course    12/2 -    vent day 1.  Trend hemoglobin and platelet count and transfuse blood products as required.  Full vent support.  Inhaled Flolan for RV dysfunction following cardiac arrest.  12/3 -    vent day 2.  Start bicarbonate drip for MARY LOU.  Titrating norepinephrine.  Wean off Flolan.  12/4 -    vent day 3.  Renal function worse.  Titrating norepinephrine.  12/5 -    vent day 4.  Blood pressure increased.  Change dexmedetomidine to propofol.  12/6 -    vent day 5.  Arrange for IVC filter placement.  Nephrology on board.  12/7 -    vent day 6.  IVC filter placed yesterday.  Renal function improving.  Taper hydrocortisone.  12/8 -    vent day 7.  Improving renal function.  Taper hydrocortisone.  12/9 -    vent day 8.  Start NPH.  Change propofol to dexmedetomidine.  12/10 - V#9, titrating norepinephrine-off, dexmedetomidine, FiO2 increased to 0.6, start lasix  12/11 - V#10, more alert, off pressors, SAT/SBT ongoing, FiO2 weaned to 0.4  12/12 -  V#11, SAT/SBT ongoing, poor weans yesterday-no change today but R SBI normal, O2 30%, neg 3.9L, TX FOB without significant secretions, liberation trial  12/13 -  Extubated yesterday, borderline sats 6L nasal cannula, very difficult to mobilize  12/14 - BiPAP overnight, 12/6-60% tolerating HFNC today, passed swallow eval, no bleeding on heparin drip  12/15 -dropped BP into the 40s at home rounds and we resuscitated her at the bedside for 1/2-hour, NE up to 20 now back to 2, 1 L fluid bolus, hemoglobin unchanged although heparin stopped initially,  Solu-Cortef 100 mg stress dose given  12/16 - hypotension resolved, off NE, HFNC 50/60, WOB low, no bleeding mentating normal, patient completed POLST and change CODE STATUS to DNAR/DNI - reviewed with   12/17 - BP borderline high, resuming full dose Coreg, HFNC 40/50, no bleeding on heparin drip, tapering IV HC off today  12/18 - BP a bit higher, HFNC 40/50, no bleeding on heparin drip, off IV hydrocortisone  12/19 -      Interval Problem Update  Reviewed last 24 hour events:    A&O x4  Follows well  SR 80-90s  SBp 120-150s  UO great, neg 3.43 L  Lasix 20 PO daily  5 lpm NC - off HFNC  BiPAP w/sleep  Tm 98.8  WBC 15.1  Vanco/Ceftriaxone - per ID - sinusitis - stop 1/21  Cardura/Coreg/Losarten - remains off Amiloride  Lovenox full dose for PE/DVTs/PAF  Hgb 8.3 - no bleeding clinically  K 3.2, Mg 1.6  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  Patient did see physiatry last week  Transition to oral anticoag - eliquis?       YESTERDAY  A&O x4  SR 80s  -160s  UO good, I/O- 216 cc  No ectopy  HFNC 40/50  BiPAP sleep  Finally doing IPV  Work of breathing low  Weak cough and low I-S  T-max 99.1, WBC slight better 14.7  Hemoglobin 8.0, no bleeding clinically  Platelet count 366 unchanged  Sodium no change 146  Normal renal function and LFTs  Taking p.o.  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      Review of  Systems  Review of Systems   Constitutional: Positive for malaise/fatigue (Bit better each day).   HENT: Negative for nosebleeds.    Eyes: Negative.    Respiratory: Negative for cough, hemoptysis, sputum production and shortness of breath.    Cardiovascular: Positive for leg swelling (Continues to improve with Lasix). Negative for chest pain and palpitations.   Gastrointestinal: Negative for abdominal pain (Improved), blood in stool, nausea and vomiting.   Genitourinary: Negative for flank pain, hematuria and urgency.   Neurological: Positive for weakness (Diffuse, slowly better, needs rehab). Negative for dizziness, focal weakness and headaches.   Endo/Heme/Allergies: Does not bruise/bleed easily.   Psychiatric/Behavioral: The patient is not nervous/anxious.        Vital Signs for last 24 hours   Temp:  [35.7 °C (96.3 °F)-37.1 °C (98.8 °F)] 37.1 °C (98.8 °F)  Pulse:  [] 82  Resp:  [16-30] 16  BP: ()/(45-96) 114/74  SpO2:  [90 %-100 %] 94 %    Hemodynamic parameters for last 24 hours  CVP:  [13 MM HG-100 MM HG] 13 MM HG    Respiratory Information for the last 24 hours       Physical Exam   Physical Exam  Vitals reviewed.   Constitutional:       Appearance: She is obese. She is not ill-appearing (More chronic than acute at this time), toxic-appearing or diaphoretic.      Interventions: She is not intubated.     Comments: HFNC   HENT:      Head: Normocephalic and atraumatic.      Nose: Nose normal.      Mouth/Throat:      Mouth: Mucous membranes are moist.      Pharynx: Oropharynx is clear.   Eyes:      General: No scleral icterus.     Conjunctiva/sclera: Conjunctivae normal.      Pupils: Pupils are equal, round, and reactive to light.   Neck:      Comments: L IJ CVC  Cardiovascular:      Rate and Rhythm: Normal rate and regular rhythm.      Pulses: Normal pulses.      Heart sounds: No murmur heard.  No gallop.       Comments: ST  Pulmonary:      Effort: Pulmonary effort is normal. No accessory muscle  usage. She is not intubated.      Breath sounds: Examination of the right-lower field reveals decreased breath sounds. Examination of the left-lower field reveals decreased breath sounds. Decreased breath sounds present. No wheezing, rhonchi or rales (Unchanged).      Comments: WOB okay  Abdominal:      General: Abdomen is protuberant. Bowel sounds are normal. There is no distension.      Palpations: Abdomen is soft. There is no fluid wave.      Tenderness: There is no abdominal tenderness. There is no right CVA tenderness, left CVA tenderness or guarding. Negative signs include Storm's sign.   Genitourinary:     Comments: Ana  Musculoskeletal:      Cervical back: Normal range of motion.      Right lower leg: Edema (No change) present.      Left lower leg: Edema (No change) present.      Comments: Left arm PICC   Skin:     General: Skin is warm and dry.      Capillary Refill: Capillary refill takes less than 2 seconds.      Coloration: Skin is not cyanotic or mottled.      Nails: There is no clubbing.   Neurological:      General: No focal deficit present.      Mental Status: She is oriented to person, place, and time. She is lethargic.      GCS: GCS eye subscore is 4. GCS verbal subscore is 5. GCS motor subscore is 6.      Cranial Nerves: Cranial nerves are intact. No cranial nerve deficit.      Sensory: No sensory deficit.      Motor: Weakness (No change) present.      Comments: Follows, back to baseline neurologically, patient is cognitively intact and fully competent to make decisions   Psychiatric:         Mood and Affect: Mood normal. Mood is not anxious.         Behavior: Behavior normal. Behavior is not agitated. Behavior is cooperative.         Thought Content: Thought content normal.         Medications  Current Facility-Administered Medications   Medication Dose Route Frequency Provider Last Rate Last Admin   • potassium chloride SA (Kdur) tablet 40 mEq  40 mEq Oral Q4HRS Alber Junior M.D.   40  mEq at 12/19/21 0747   • alteplase (CATHFLO) syringe *Central Line Only* 2 mg  2 mg Intracatheter Once Alber Junior M.D.       • enoxaparin (LOVENOX) inj 100 mg  100 mg Subcutaneous Q12HRS Alber Junior M.D.   100 mg at 12/19/21 0525   • carvedilol (COREG) tablet 25 mg  25 mg Oral BID WITH MEALS Alber Junior M.D.   25 mg at 12/19/21 0634   • furosemide (LASIX) tablet 20 mg  20 mg Oral Q DAY Alber Junior M.D.   20 mg at 12/19/21 0526   • losartan (COZAAR) tablet 100 mg  100 mg Oral Q DAY Alber Junior M.D.   100 mg at 12/16/21 1210   • vancomycin (VANCOCIN) 1,250 mg in  mL IVPB  1,250 mg Intravenous Q EVENING Alber Junior M.D.   Stopped at 12/18/21 2113   • insulin NPH (HumuLIN N,NovoLIN N) injection  5 Units Subcutaneous BID Alber Junior M.D.   5 Units at 12/19/21 0529   • lidocaine (LIDODERM) 5 % 1 Patch  1 Patch Transdermal Q24HR Alber Junior M.D.   1 Patch at 12/19/21 0841   • insulin regular (HumuLIN R,NovoLIN R) injection  3-14 Units Subcutaneous 4X/DAY ACHS Alber Junior M.D.   3 Units at 12/18/21 2109    And   • dextrose 50% (D50W) injection 50 mL  50 mL Intravenous Q15 MIN PRN Alber Junior M.D.       • [Held by provider] AMILoride (MIDAMOR) tablet 5 mg  5 mg Oral Q DAY Alber Junior M.D.   5 mg at 12/15/21 0517   • dexamethasone (DECADRON) tablet 0.5 mg  0.5 mg Oral Q12HRS Alber Junior M.D.   0.5 mg at 12/19/21 0528   • doxazosin (CARDURA) tablet 2 mg  2 mg Oral DAILY Alber Junior M.D.   2 mg at 12/19/21 0526   • fludrocortisone (FLORINEF) tablet 0.1 mg  0.1 mg Oral BID Alber Junior M.D.   0.1 mg at 12/19/21 0526   • gabapentin (NEURONTIN) capsule 200 mg  200 mg Oral Q8HRS Alber Junior M.D.   200 mg at 12/19/21 0525   • hydrocortisone (CORTEF) tablet 20 mg  20 mg Oral DAILY Alber Junior M.D.   20 mg at 12/19/21 0526    And   • hydrocortisone (CORTEF) tablet 10 mg  10 mg Oral Nightly Alber Junior M.D.   10  mg at 12/18/21 1805   • levothyroxine (SYNTHROID) tablet 75 mcg  75 mcg Oral AM ES Alber Junior M.D.   75 mcg at 12/19/21 0527   • liothyronine (CYTOMEL) tablet 25 mcg  25 mcg Oral QAM AC Alber Junior M.D.   25 mcg at 12/19/21 0634   • omeprazole (PRILOSEC) capsule 40 mg  40 mg Oral DAILY Alber Junior M.D.   40 mg at 12/19/21 0527   • acetaminophen (Tylenol) tablet 650 mg  650 mg Oral Q4HRS PRN Alber Junior M.D.       • ondansetron (ZOFRAN ODT) dispertab 4 mg  4 mg Oral Q4HRS PRN Alber Junior M.D.       • oxyCODONE immediate-release (ROXICODONE) tablet 5 mg  5 mg Oral Q4HRS PRN Alber Junior M.D.   5 mg at 12/14/21 2128    Or   • oxyCODONE immediate release (ROXICODONE) tablet 10 mg  10 mg Oral Q4HRS PRN Alber Junior M.D.   10 mg at 12/19/21 0841   • melatonin tablet 5 mg  5 mg Oral HS PRN - MR X 1 MARTHA Reilly.P.R.N.   5 mg at 12/16/21 0115   • hydrALAZINE (APRESOLINE) injection 20 mg  20 mg Intravenous Q4HRS PRN Eligio Del Castillo M.D.   20 mg at 12/09/21 0115   • labetalol (NORMODYNE/TRANDATE) injection 10-20 mg  10-20 mg Intravenous Q4HRS PRN Eligio Del Castillo M.D.   10 mg at 12/18/21 0407   • MD Alert...Vancomycin per Pharmacy   Other PHARMACY TO DOSE Eligio Del Castillo M.D.       • cefTRIAXone (Rocephin) 2 g in  mL IVPB  2 g Intravenous Q24HRS Eligio Del Castillo M.D.   Stopped at 12/19/21 0557   • Respiratory Therapy Consult   Nebulization Continuous RT Servando Rush M.D.       • MD Alert...ICU Electrolyte Replacement per Pharmacy   Other PHARMACY TO DOSE Servando Rush M.D.       • Pharmacy Consult Request ...Pain Management Review 1 Each  1 Each Other PHARMACY TO DOSE David Yoder M.D.       • ondansetron (ZOFRAN) syringe/vial injection 4 mg  4 mg Intravenous Q4HRS PRN David Yoder M.D.   4 mg at 12/11/21 4528   • promethazine (PHENERGAN) suppository 12.5-25 mg  12.5-25 mg Rectal Q4HRS PRN David Yoder M.D.            Fluids    Intake/Output Summary (Last 24 hours) at 12/19/2021 1047  Last data filed at 12/19/2021 0800  Gross per 24 hour   Intake 638.47 ml   Output 4060 ml   Net -3421.53 ml       Laboratory          Recent Labs     12/17/21 0444 12/18/21 0217 12/19/21  0120   SODIUM 146* 146* 141   POTASSIUM 3.5* 3.6 3.2*   CHLORIDE 110 112 108   CO2 26 27 24   BUN 15 11 8   CREATININE 0.56 0.47* 0.54   MAGNESIUM 1.7 1.8 1.6   PHOSPHORUS 3.4 3.2 3.3   CALCIUM 8.3* 8.2* 8.2*     Recent Labs     12/17/21 0444 12/18/21 0217 12/19/21  0120   ALTSGPT 54* 46 54*   ASTSGOT 50* 29 41   ALKPHOSPHAT 171* 167* 172*   TBILIRUBIN 0.5 0.4 0.4   GLUCOSE 159* 125* 231*     Recent Labs     12/17/21 0444 12/18/21 0217 12/19/21  0120   WBC 15.6* 14.7* 15.1*   NEUTSPOLYS 82.60* 80.10* 91.20*   LYMPHOCYTES 6.20* 8.70* 4.40*   MONOCYTES 6.20 6.30 3.50   EOSINOPHILS 0.30 0.30 0.90   BASOPHILS 0.30 0.20 0.00   ASTSGOT 50* 29 41   ALTSGPT 54* 46 54*   ALKPHOSPHAT 171* 167* 172*   TBILIRUBIN 0.5 0.4 0.4     Recent Labs     12/17/21 0444 12/18/21 0217 12/19/21  0120   RBC 3.01* 2.99* 3.09*   HEMOGLOBIN 8.1* 8.0* 8.3*   HEMATOCRIT 27.5* 27.5* 28.9*   PLATELETCT 393 366 341       Imaging  X-Ray:  I have personally reviewed the images and compared with prior images. and My impression is: Unchanged left lower lobe opacities    Assessment/Plan  * Cardiac arrest (HCC)  Assessment & Plan  S/p PEA, asystole and ventricular tachycardia in ED - precipitated by hemorrhagic shock  ROSC after 7 rounds of CPR, IV epinephrine, IV bicarbonate solution and massive transfusion protocol  ECHO with RV dysfunction and RVSP of 50 mmHg  Hemodynamics improved, weaned off pressors  Neurologically improved, extubated 12/12, diffusely weak  Lidocaine patch for chest wall pain has helped  Reviewed CODE STATUS with patient and her , POLST completed by palliative care, DANR/DNI    Pulmonary hypertension (HCC)  Assessment & Plan  RVSP 50  Monitor for ROSSI  Elevated  nicol-arrest?  F/u ECHO and possibly PSG as an outpatient  Forced diuresis as needed, CVP running 13-18, monitoring SBp  Enteral Lasix with mild net negative fluid balance, continue and reassess daily  Responding nicely to enteral Lasix now, may not need it long-term?    Atelectasis  Assessment & Plan  Multifactorial-status post arrest, prolonged course of ventilator, weak and not moving much, obese with episode of pancreatitis  Monitor for the need for therapeutic bronchoscopy, active TX FOB 12/8, follow-up TX FOB with extubation 12/12 with minimal plugs  Mobilize to chair twice daily  Up for meals, patient encouraged again, seems motivated now  I-S, as needed IPPV/PEP-reviewed with RT  BIPAP with sleep and naps as needed  RT protocols  Serial chest x-ray as needed    Hypokalemia  Assessment & Plan  Replete potassium, ERP as needed    Paroxysmal atrial fibrillation (HCC)  Assessment & Plan  Rate controlled, remaining in SR, no recurrence  Continue carvedilol as hemodynamics allow  Optimize potassium and magnesium  [id number] score high, 10% risk CVA, will need anticoagulation when/if becomes clinically appropriate  Initiatd heparin drip without bolus 12/13, held 12/15 x 24 hrs for low Bp - Hgb unchanged and resumed 12/16  Continue therapeutic dose Lovenox and work with pharmacy/case management re: which drug insurance covers warfarin versus NOAC    Hypocalcemia  Assessment & Plan  Replete calcium, ERP as needed    Hypomagnesemia  Assessment & Plan  Replete magnesium, ERP as needed    Hemoperitoneum  Assessment & Plan  Surgery has evaluated  No indication for surgery at this time  Trend hemoglobin and transfuse PRBCs to keep hemoglobin greater than 7-no change in greater than 1 week  Unchanged hemoglobin  Status post IVC filter 12/6  Heparin PPx reinitiated 12/10, no bleeding, restarted heparin drip no bolus for PE/PAF 12/13, initiate Lovenox 12/18, oral agent soon  HGB unchanged, no clinical signs of  bleeding-continue to monitor  Reverse with protamine or FFC as needed, hold as needed  Hypotensive episode again 12/15 likely related to BP meds and diuresis,  Still no evidence of recurrent bleeding, continue to monitor abdomen and hemoglobin    Gastrointestinal hemorrhage  Assessment & Plan  History of gastrointestinal hemorrhage with no source identified-no recurrence/monitoring  Possible gastrointestinal hemorrhage provoked by IV heparin for pulmonary embolism - no gross blood noted in stool - positive for occult blood  Continue PPI twice daily  GI consult noted, if rebleeds f/u GI consultation  Trend hemoglobin and transfuse PRBCs to keep hemoglobin greater than 7  Heparin subcu PPx restarted 12/10, trial IV heparin no bolus starting 12/13, consider enteral agent if no bleeding after several days  Status post IVC filter off anticoagulation for A. Fib  HGB unchanged, no clinical signs of bleeding-continue to monitor  Reverse with protamine or FFC as needed, hold s needed  Hypotensive episode 12/16 likely related to BP meds/diuresis-Heparin held for night and hemoglobin unchanged and no evidence of bleeding, resuming heparin drip without bolus  Hemoglobin stable  Continue full dose Lovenox, oral therapy soon when insurance/ let us know warfarin versus NOAC    DVT (deep venous thrombosis) (HCC)  Assessment & Plan  Imaging reveals LUE DVT associated with her PICC line and a distal LLE DVT - 12/1 & 12/2 studies  Anticoagulation strictly contraindicated due to acute gastrointestinal hemorrhage and hemoperitoneum  IVC filter placement on 12/6  Also needs anticoagulation from a PE & PAF [id number] 2 risk score - 10%  Resuming IV heparin without bolus 12/13  Heparin held overnight 12/15 for hypotensive episode, no bleeding identified as she had on admit, heparin resumed 12/16  Continue full dose Lovenox 12/18, transition to oral agent when we know which her insurance will cover    Shock (HCC)- (present on  admission)  Assessment & Plan  Hemorrhagic shock with acute gastrointestinal blood loss and vasodistributive shock - 12/2 off pressors   Shock has resolved, weaned off norepinephrine  Hemoglobin grossly unchanged monitoring for hypotension/bleeding since resuming IV heparin    Recurrent shock 12/15-suspect hypovolemia, multiple antihypertensives and IV narcotics  Responded to Nathan-Synephrine pops and norepinephrine infusion along with 1 L saline bolus  Patient -2.1 51L/24 hours, -10+ liters since 12/10  Holding losartan, Cardura, amiloride and carvedilol  DC IV Dilaudid for now  Weaning off norepinephrine now 20 -> 2, off 12/15  Stress dose steroids given in this addisonian patient, wean tomorrow    Hemodynamics improved, off pressors  Discontinued supplemental IV hydrocortisone 12/17  Cont Lasix but p.o. 20 mg only-responding    Acute pancreatitis  Assessment & Plan  Resolved likely  She is having bowel movements  She is tolerating enteral tube feedings  She denies N/V/abdominal pain  Continue to monitor    PE (pulmonary thromboembolism) (HCC)- (present on admission)  Assessment & Plan  Anticoagulation was contraindicated initially  IVC filter placement on 12/6  No change in hemoglobin or significant GI bleeding since heparin stopped, greater than 1 week  No issues with subcu heparin PPx and platelet count has normalized  DC subcu heparin PPx and start heparin without bolus by drip 12/13  Consider transitioning to enteral agent if tolerates IV heparin over the next 72+ hours  Monitoring for bleeding  Heparin 12/15 for hypotensive episode, no bleeding clinically, first hemoglobin trending down but stable thereafter, heparin not reversed  Hemodynamics improved & no bleeding  Continue Lovenox and work with pharmacy/case management re: which drug insurance covers warfarin versus NOAC      Debilitated patient  Assessment & Plan  Very complex medical patient now post arrest and 10-11-day ventilator course with severe  diffuse weakness needing Neeru lift to transfer to chair  PT/OT to eval and treat  Speech therapy consult, swallow evaluation  Appears to be doing cognitively okay, monitor for need for cognitive eval  Rehab versus LTAC - both consulted  Physiatry consultation noted  Not a candidate for SNU given her complex medical scenario and ongoing treatments as she significantly improves  Transfer to [facility], patient may be ready early next week for rehab/LTAC    Acute blood loss anemia- (present on admission)  Assessment & Plan  Suspect gastrointestinal source of blood loss as well as acute hemoperitoneum-no recurrence clinically-monitoring  10 you to trend hemoglobin and transfuse PRBCs to keep hemoglobin greater than 7  Likely no recurrence, hemoglobin unchanged for approximately >7 days  That is post 3 days heparin SQ PPx-> initiate IV heparin without bolus  Now on full dose Lovenox and oral agent when we know which her insurance will cover:  NOAC versus warfarin    Thrombocytopenia (HCC)- (present on admission)  Assessment & Plan  Trend platelet count and thromboelastogram with platelet mapping as needed  Transfuse platelets as required  Count normalized 12/9-remains normal  Still unchanged since restarting heparin    Hemochromatosis- (present on admission)  Assessment & Plan  History of    Adrenal insufficiency (Valentín's disease) (HCC)- (present on admission)  Assessment & Plan  Continue hydrocortisone to 20 mg twice daily  Continue Florinef, 0.1 mg twice daily  Monitor for the need to bump hydrocortisone up with stress  Monitoring hemodynamics/electrolytes closely  IV hydrocortisone discontinued 12/17, back to baseline regimen for Valentín's disease, reassess daily    Elevated LFTs- (present on admission)  Assessment & Plan  Due to ischemic hepatopathy from cardiac arrest and shock  Trend enzymes and synthetic function  Avoid hepatotoxins  Alk phos and transaminases trending down - continue to monitor    MRSA and E. coli  sinusitis  Assessment & Plan  On outpatient IV antibiotics - LUE PICC in place - thrombus LUE 12/1 U/s  Continue vancomycin and ceftriaxone-6-week course of antibiotics planned per ID-to complete 12/21 per pharmacy  monitor for secondary infections and antibiotic complications  Finally fully anticoagulated, pull PICC    Acute kidney injury (HCC)- (present on admission)  Assessment & Plan  Continues to slowly improve, creatinine normalized and BUN still coming down  Suspect ATN due to shock in lady with underlying atrophic kidneys  Monitor renal function and urine output  Avoid nephrotoxins and renal dose medications  Serial BMP    Primary hypertension- (present on admission)  Assessment & Plan  Goal SBP less than 160  Hydralazine and labetalol as necessary to achieve blood pressure goals-if frequently required adjust enteral regimen  Hold amiloride, 5 mg daily  Resume doxazosin, 2 mg daily  Continue carvedilol, 25mg twice daily  Continue losartan 100 mg  Asked pharmacy to spread out the BP meds over the course the day and not give him all in the a.m.!  Lasix 20 PO daily, responding nicely to low-dose oral therapy    Hypothyroidism- (present on admission)  Assessment & Plan  Continue levothyroxine and liothyronine  Repeat TSH in 4 weeks, sooner if significant arrhythmias occur without obvious etiology  Most recent TSH borderline low, follow-up with endocrinology-she sees Dr. Jeff    Obesity (BMI 35.0-39.9 without comorbidity)- (present on admission)  Assessment & Plan  Behavioral modification and weight loss encouraged again  Physical therapy and possible rehab versus LTACH recommended as well  Hypothyroid on repletion therapy by Dr. Jeff  TSH borderline low  Monitor for ROSSI - PAP as needed - seems to do well with HS use-continue with BiPAP use sleep/naps  PSG later?  Reassess as outpatient  Patient's  has ROSSI and she is quite familiar with sleep apnea and CPAP  Patient seems improved with  nocturnal regular use of PAP since extubation    Acute respiratory failure with hypoxia (HCC)- (present on admission)  Assessment & Plan  Intubated 12/2-extubated 12/12  RT protocols  Mobility level 2-3  Aggressive pulmonary toilet, repeat TX FOB as needed  Forced diuresis ongoing - dose with diamox?  Serial chest x-ray as needed  BiPAP for support post extubation as needed, persisting significant ATX  PAP with sleep for possible ROSSI  HFC transition to regular nasal cannula  Continue full dose Lovenox, off IV heparin, working with pharmacy/ to see what insurance covers in terms of warfarin versus NOAC     VTE:  Contraindicated  Ulcer: PPI  Lines: Central Line  Ongoing indication addressed and Perez Catheter  Ongoing indication addressed     I have performed a physical exam and reviewed and updated ROS and Plan today (12/19/2021). In review of yesterday's note (12/18/2021), there are no changes except as documented above.     Discussed patient condition and risk of morbidity and/or mortality with Family, RN, RT, Pharmacy, Charge nurse / hot rounds and Patient     Case reviewed with Dr Erazo, patient appears ready for transfer out of ICU to [facility], [facility] will sign off but be avaiable.  To rehab early next week?

## 2021-12-19 NOTE — ASSESSMENT & PLAN NOTE
Prior hemoperitoneum complicated with cardiac arrest. 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

## 2021-12-19 NOTE — CARE PLAN
Problem: Pain - Standard  Goal: Alleviation of pain or a reduction in pain to the patient’s comfort goal  Outcome: Progressing     Problem: Knowledge Deficit - Standard  Goal: Patient and family/care givers will demonstrate understanding of plan of care, disease process/condition, diagnostic tests and medications  Outcome: Progressing     Problem: Skin Integrity  Goal: Skin integrity is maintained or improved  Outcome: Progressing   The patient is Watcher - Medium risk of patient condition declining or worsening    Shift Goals  Clinical Goals: Pain control  Patient Goals: rest  Family Goals: NA    Progress made toward(s) clinical / shift goals:  Pt given pain medication throughout the shift    Patient is not progressing towards the following goals:

## 2021-12-19 NOTE — ASSESSMENT & PLAN NOTE
On home regimen per her endocrinologist: hydrocortisone, dexamthasone, florinef  Received tapering dose of hydrocortisone.  Now on hydrocortisone home dose.

## 2021-12-19 NOTE — ASSESSMENT & PLAN NOTE
Currently in sinus  Anticoagulation contraindicated at this time given recurrent intra-abdominal bleeding

## 2021-12-19 NOTE — ASSESSMENT & PLAN NOTE
DVT in left upper venous thrombosis including subclavian vein, axillary vein. DVT the left posterior tibial vein 12/1 and 12/2. She is also found to have DVT RUE in one of the paired mid-distal brachial vein 12/25.   Anticoagulation contraindicated at this time given recurrent severe bleed  S/p IVC filter 12/6

## 2021-12-19 NOTE — ASSESSMENT & PLAN NOTE
In setting of acute pancreatitis in a pt on full dose anticoagulation  Worsened with new bleeding episode on 12/23  Of splenic artery aneurysm bleeding, status post stenting by IR 12/23  Surgery consulted, poor surgical candidate, cont monitoring Hb  Recent CT abd 12/25 shows unchanged large hematoma in the left abdomen  Per surgery, no intervention at this point. Poor surgical candidate. Contine monitoring Hb. No sign of abdominal compartment syndrome    12/28 pending hospice acceptance,  to follow-up  Met with  at bedside  Discussed with patient and , goals of hospice care, would like to continue insulin and conservative management

## 2021-12-19 NOTE — PROGRESS NOTES
4 Eyes Skin Assessment Completed by AILYN Lemus and AILYN Duffy.    Head WDL  Ears Redness and Blanching blanching  Nose WDL  Mouth WDL  Neck Bruising on central line  Breast/Chest Discoloration  Shoulder Blades WDL  Spine WDL  (R) Arm/Elbow/Hand Bruising and Scab  (L) Arm/Elbow/Hand Bruising and Scab  Abdomen Redness, Edema, scab with puss colored discharge on RMQ  Groin Redness, Blanching, Excoriation and Swelling  Scrotum/Coccyx/Buttocks Redness, Blanching and Excoriation  (R) Leg Shiny and Edema  (L) Leg Shiny and Edema  (R) Heel/Foot/Toe Redness, Blanching, Discoloration, Swelling and Edema  (L) Heel/Foot/Toe Redness, Blanching, Discoloration, Swelling and Edema          Devices In Places Blood Pressure Cuff, Pulse Ox, Perez, SCD's, Central Line and Nasal Cannula      Interventions In Place NC W/Ear Foams, Bipap Protecta-Gel, Heel Float Boots, Waffle Overlay, TAP System, Pillows, Q2 Turns, Low Air Loss Mattress and Barrier Cream    Possible Skin Injury No    Pictures Uploaded Into Epic N/A  Wound Consult Placed N/A  RN Wound Prevention Protocol Ordered No

## 2021-12-19 NOTE — PROGRESS NOTES
Report given to Tati ROBERTSON. All questions answered. Belongings transferred with patient into T607.

## 2021-12-19 NOTE — PROGRESS NOTES
"Hospital Medicine Daily Progress Note    Date of Service  12/19/2021    Chief Complaint  Donna Isaac is a 56 y.o. female admitted 12/1/2021 with abd/chest pain    Hospital Course  Ms Isaac has a complicated past medical history that includes traumatic brain injury, congestive heart failure and [place]'s disease.  She presented the emergency room on 12/1/2021 with complaints of abdominal pain.  Patient was found to have a pulmonary embolism and acute pancreatitis.  Patient started on heparin drip and admitted to the hospital she subsequently developed shock,  she was started on pressors and orders were changed to admit to the ICU.  While waiting for transfer the patient had cardiac arrest in the emergency room, CPR was per formed, she was intubated, she was resuscitated with massive transfusion protocol.  Patient has been supported with ventilator, and IVC filter has been placed 12/6 , her renal function has improved and she was extubated on 12/13/2021.    Interval Problem Update  Pt states she feels \"OK\".  Notes quite a bit of swelling still in her legs but her arms seem to be less swollen today.  Some SOB and cough.  Was up to chair yesterday for >12hrs    AFebrile  Sinus   -150  5 LNC    I have personally seen and examined the patient at bedside. I discussed the plan of care with patient, bedside RN and pharmacy.    Consultants/Specialty  general surgery and nephrology    Code Status  DNAR/DNI    Disposition  Patient is not medically cleared.   Anticipate discharge to to an inpatient rehabilitation hospital.  I have placed the appropriate orders for post-discharge needs.    Review of Systems  Review of Systems   Constitutional: Negative for chills and fever.   HENT: Negative for nosebleeds and sore throat.    Eyes: Negative for blurred vision and double vision.   Respiratory: Positive for cough and shortness of breath.    Cardiovascular: Positive for leg swelling. Negative for chest pain " and palpitations.   Gastrointestinal: Positive for abdominal pain. Negative for diarrhea, nausea and vomiting.   Genitourinary: Negative for dysuria and urgency.   Musculoskeletal: Negative for back pain.   Skin: Negative for rash.   Neurological: Negative for dizziness, loss of consciousness and headaches.        Physical Exam  Temp:  [36.2 °C (97.1 °F)-37.1 °C (98.8 °F)] 37.1 °C (98.8 °F)  Pulse:  [] 82  Resp:  [14-30] 19  BP: ()/(45-96) 109/79  SpO2:  [90 %-100 %] 98 %    Physical Exam  Vitals reviewed.   Constitutional:       General: She is not in acute distress.     Appearance: She is well-developed. She is obese. She is not diaphoretic.   HENT:      Head: Normocephalic and atraumatic.   Neck:      Vascular: No JVD.   Cardiovascular:      Rate and Rhythm: Normal rate and regular rhythm.   Pulmonary:      Effort: Pulmonary effort is normal. No respiratory distress.      Breath sounds: No stridor. No wheezing or rales.   Abdominal:      Palpations: Abdomen is soft.      Tenderness: There is no abdominal tenderness. There is no guarding or rebound.   Musculoskeletal:      Right lower leg: Edema present.      Left lower leg: Edema present.      Comments: 3+ pitting edema to mid thigh   Skin:     General: Skin is warm and dry.      Findings: No rash.   Neurological:      General: No focal deficit present.      Mental Status: She is alert and oriented to person, place, and time.   Psychiatric:         Mood and Affect: Mood normal.         Thought Content: Thought content normal.         Fluids    Intake/Output Summary (Last 24 hours) at 12/19/2021 1341  Last data filed at 12/19/2021 1200  Gross per 24 hour   Intake 683.05 ml   Output 6260 ml   Net -5576.95 ml       Laboratory  Recent Labs     12/17/21  0444 12/18/21  0217 12/19/21  0120   WBC 15.6* 14.7* 15.1*   RBC 3.01* 2.99* 3.09*   HEMOGLOBIN 8.1* 8.0* 8.3*   HEMATOCRIT 27.5* 27.5* 28.9*   MCV 91.4 92.0 93.5   MCH 26.9* 26.8* 26.9*   MCHC 29.5* 29.1*  28.7*   RDW 74.8* 74.1* 74.4*   PLATELETCT 393 366 341   MPV 11.5 10.7 10.7     Recent Labs     12/17/21  0444 12/18/21  0217 12/19/21  0120   SODIUM 146* 146* 141   POTASSIUM 3.5* 3.6 3.2*   CHLORIDE 110 112 108   CO2 26 27 24   GLUCOSE 159* 125* 231*   BUN 15 11 8   CREATININE 0.56 0.47* 0.54   CALCIUM 8.3* 8.2* 8.2*                   Imaging  DX-CHEST-PORTABLE (1 VIEW)   Final Result         1.  Mild edema and/or infiltrate, particularly on the right, stable since prior study   2.  Cardiomegaly      DX-CHEST-PORTABLE (1 VIEW)   Final Result         1.  Mild edema and/or infiltrate, particularly on the right   2.  Cardiomegaly      DX-CHEST-PORTABLE (1 VIEW)   Final Result         1.  Bibasilar atelectasis or early infiltrate   2.  Cardiomegaly      DX-CHEST-PORTABLE (1 VIEW)   Final Result      1.  Bibasilar underinflation atelectasis which could obscure an additional process. This is unchanged.   2.  Suspect small LEFT pleural effusion      DX-CHEST-PORTABLE (1 VIEW)   Final Result         1.  Bilateral basilar atelectasis, no focal infiltrate   2.  Cardiomegaly      DX-CHEST-PORTABLE (1 VIEW)   Final Result         1.  Bilateral basilar atelectasis, no focal infiltrate   2.  Cardiomegaly      DX-CHEST-PORTABLE (1 VIEW)   Final Result         1.  Left lower lobe atelectasis or infiltrates, similar compared to prior study.   2.  Cardiomegaly      DX-CHEST-PORTABLE (1 VIEW)   Final Result         1.  Left lower lobe atelectasis or infiltrates, similar compared to prior study.   2.  Cardiomegaly      DX-CHEST-PORTABLE (1 VIEW)   Final Result         1.  Left lower lobe atelectasis or infiltrates, similar compared to prior study.   2.  Cardiomegaly      IR-INSERT IVC FILTER WITH IG & SI   Final Result      Ultrasound and fluoroscopic guided placement of an Option Elite IVC filter.      DX-CHEST-PORTABLE (1 VIEW)   Final Result         1.  Pulmonary edema and/or infiltrates are identified, which are stable since the  prior exam.   2.  Cardiomegaly      DX-CHEST-PORTABLE (1 VIEW)   Final Result      Tubes and lines as described above.      Bibasilar atelectasis with small pleural effusions.      DX-CHEST-PORTABLE (1 VIEW)   Final Result         1. No significant interval change.      VL-[id number] VIEW   Final Result      Cortrak feeding tube tip projects in the region of the mid stomach.      DX-CHEST-PORTABLE (1 VIEW)   Final Result      Placement of right IJ dialysis catheter.      Otherwise stable findings.      DX-CHEST-PORTABLE (1 VIEW)   Final Result         1.  Pulmonary edema and/or infiltrates are identified, which are stable since the prior exam.   2.  Cardiomegaly      DX-CHEST-PORTABLE (1 VIEW)   Final Result         1.  Pulmonary edema and/or infiltrates are identified, which are stable since the prior exam.   2.  Cardiomegaly      DX-CHEST-FOR LINE PLACEMENT Perform procedure in: Patient's Room   Final Result      Interval placement of a left IJ central venous catheter with the tip overlying the right atrium.      US-EXTREMITY VENOUS LOWER BILAT   Final Result      CTA ABDOMEN PELVIS W & W/O POST PROCESS   Final Result      1.  Small to moderate amount of hemoperitoneum in the abdomen and pelvis. No active extravasation is identified.   2.  No aortic aneurysm or dissection.   3.  Peripancreatic stranding can be related to pancreatitis.   4.  Cardiomegaly.   5.  Hepatic steatosis.   6.  Status post cholecystectomy.   7.  Atrophic kidneys bilaterally.   8.  Status post gastric bypass surgery.   9.  Mild wall thickening of the second portion of the duodenum with surrounding inflammation. This can be seen in duodenitis/peptic ulcer disease.   10.  Colonic diverticulosis.   11.  Bibasilar atelectasis/consolidation. Groundglass opacities with septal thickening at the lung bases can be seen in the setting of edema or multifocal pneumonia.   12.  Bilateral anterior rib fractures.      Findings discussed with Dr. Rush.       DX-CHEST-PORTABLE (1 VIEW)   Final Result      1.  Endotracheal tube present.      2.  Cardiomegaly with interstitial prominence.      EC-ECHOCARDIOGRAM COMPLETE W/ CONT   Final Result      CT-CTA CHEST PULMONARY ARTERY W/ RECONS   Final Result      1.  Positive for pulmonary embolism located in multiple segmental and subsegmental branches      2.  Minimal elevation of RV/LV ratio      3.  Mild atelectasis      4.  Left PICC line present and appears appropriately located      5.  Findings were discussed with BLAINE VEGA on 12/1/2021 5:35 PM.                  CT-ABDOMEN-PELVIS WITH   Final Result      1.  Apparent inflammation of the pancreas with surrounding fat stranding and fluid suggesting pancreatitis. Recommend correlation with lipase.      2.  Diverticulosis without diverticulitis.      3.  Hepatic steatosis.      US-EXTREMITY VENOUS UPPER UNILAT LEFT   Final Result      DX-CHEST-PORTABLE (1 VIEW)   Final Result      1.  No acute cardiopulmonary abnormality identified.      2.  Left PICC line appears appropriately located           Assessment/Plan  * Cardiac arrest (HCC)  Assessment & Plan  Secondary to hemorrhagic shock    Pulmonary hypertension (HCC)  Assessment & Plan  ROSSI, Hx PE  Cont O2 support, diuresis    Hypokalemia  Assessment & Plan  Follow and replace  Also addressing Mg    Paroxysmal atrial fibrillation (HCC)  Assessment & Plan  Currently in sinus  Rate/rythm control with Carvedilol  anticoagulation currently with enoxaparin with plan to transition to DOAC on DC    Hypocalcemia  Assessment & Plan  S/p replacement    Hypomagnesemia  Assessment & Plan  1.6 this am; giving 2g IV  Repeat lab in am    Hemoperitoneum  Assessment & Plan  In setting of acute pancreatitis in a pt on full dose anticoagulation  Stable  Follow daily H&H    DVT (deep venous thrombosis) (HCC)  Assessment & Plan  IVC in place  Enoxaparin at full dose  Eventual DOAC    Shock (HCC)- (present on admission)  Assessment &  Plan  Hemorrhagic from intra-abd bleed  Now resolved  Cont to follow H&H  Has been re-challenged with Enoxaparin 100mg/kg and is tolerating thus far    Acute pancreatitis  Assessment & Plan  Idiopathic, IV fluids, symptomatic management, bowel rest    PE (pulmonary thromboembolism) (HCC)- (present on admission)  Assessment & Plan  Tolerating enoxaparin 100mg/kg  Has IVC in place   Transition to po DOAC as DC plan becomes more clear and if no further issues with bleeding    Debilitated patient  Assessment & Plan  PT/OT  Probable Rehab DC    Acute blood loss anemia- (present on admission)  Assessment & Plan  Secondary to retroperitoneal bleed    Thrombocytopenia (HCC)- (present on admission)  Assessment & Plan  Resolved  Cont to follow    Adrenal insufficiency ([place]'s disease) (HCC)- (present on admission)  Assessment & Plan  On home regimen per her endocrinologist: hydrocortisone, dexamthasone, florinef    Elevated LFTs- (present on admission)  Assessment & Plan  Have stabilized    MRSA and E. coli sinusitis  Assessment & Plan  Patient reports daily IV infusion of antibiotics for MRSA sinusitis  Cont while in house  Get records    Acute kidney injury (HCC)- (present on admission)  Assessment & Plan  Renal function has since normalized    Primary hypertension- (present on admission)  Assessment & Plan  Currently on losarten 100, carvedilol 25, doxazosin 2, amiloride 25      Hypothyroidism- (present on admission)  Assessment & Plan  Liothyronine 25  Levothyroxine 75    Acute respiratory failure with hypoxia (HCC)- (present on admission)  Assessment & Plan  Intubated due to hemorrhagic shock  Extubated 12/13  Still quite frail  DNR/I at present  IS  Mobilize  O2/RT protocols  Follow volume status carefully       VTE prophylaxis: therapeutic anticoagulation with enoxaparin    I have performed a physical exam and reviewed and updated ROS and Plan today (12/19/2021). In review of yesterday's note (12/18/2021), there are  no changes except as documented above.

## 2021-12-19 NOTE — ASSESSMENT & PLAN NOTE
Patient and  now opting for home hospice.  Hospice referral placed.  Discussed with CM/SW for discharge planning.

## 2021-12-20 LAB
ALBUMIN SERPL BCP-MCNC: 2.7 G/DL (ref 3.2–4.9)
ALBUMIN/GLOB SERPL: 0.9 G/DL
ALP SERPL-CCNC: 175 U/L (ref 30–99)
ALT SERPL-CCNC: 47 U/L (ref 2–50)
ANION GAP SERPL CALC-SCNC: 10 MMOL/L (ref 7–16)
AST SERPL-CCNC: 19 U/L (ref 12–45)
BASOPHILS # BLD AUTO: 0.2 % (ref 0–1.8)
BASOPHILS # BLD: 0.02 K/UL (ref 0–0.12)
BILIRUB SERPL-MCNC: 0.4 MG/DL (ref 0.1–1.5)
BUN SERPL-MCNC: 8 MG/DL (ref 8–22)
CALCIUM SERPL-MCNC: 8.5 MG/DL (ref 8.5–10.5)
CHLORIDE SERPL-SCNC: 107 MMOL/L (ref 96–112)
CO2 SERPL-SCNC: 25 MMOL/L (ref 20–33)
CREAT SERPL-MCNC: 0.42 MG/DL (ref 0.5–1.4)
EOSINOPHIL # BLD AUTO: 0.05 K/UL (ref 0–0.51)
EOSINOPHIL NFR BLD: 0.5 % (ref 0–6.9)
ERYTHROCYTE [DISTWIDTH] IN BLOOD BY AUTOMATED COUNT: 72.5 FL (ref 35.9–50)
GLOBULIN SER CALC-MCNC: 2.9 G/DL (ref 1.9–3.5)
GLUCOSE BLD-MCNC: 104 MG/DL (ref 65–99)
GLUCOSE BLD-MCNC: 121 MG/DL (ref 65–99)
GLUCOSE BLD-MCNC: 144 MG/DL (ref 65–99)
GLUCOSE BLD-MCNC: 171 MG/DL (ref 65–99)
GLUCOSE SERPL-MCNC: 109 MG/DL (ref 65–99)
HCT VFR BLD AUTO: 30.3 % (ref 37–47)
HGB BLD-MCNC: 8.9 G/DL (ref 12–16)
IMM GRANULOCYTES # BLD AUTO: 0.18 K/UL (ref 0–0.11)
IMM GRANULOCYTES NFR BLD AUTO: 1.7 % (ref 0–0.9)
LYMPHOCYTES # BLD AUTO: 0.82 K/UL (ref 1–4.8)
LYMPHOCYTES NFR BLD: 7.7 % (ref 22–41)
MAGNESIUM SERPL-MCNC: 1.6 MG/DL (ref 1.5–2.5)
MCH RBC QN AUTO: 27.1 PG (ref 27–33)
MCHC RBC AUTO-ENTMCNC: 29.4 G/DL (ref 33.6–35)
MCV RBC AUTO: 92.1 FL (ref 81.4–97.8)
MONOCYTES # BLD AUTO: 0.64 K/UL (ref 0–0.85)
MONOCYTES NFR BLD AUTO: 6 % (ref 0–13.4)
MORPHOLOGY BLD-IMP: NORMAL
NEUTROPHILS # BLD AUTO: 8.94 K/UL (ref 2–7.15)
NEUTROPHILS NFR BLD: 83.9 % (ref 44–72)
NRBC # BLD AUTO: 0.03 K/UL
NRBC BLD-RTO: 0.3 /100 WBC
PHOSPHATE SERPL-MCNC: 3.4 MG/DL (ref 2.5–4.5)
PLATELET # BLD AUTO: 337 K/UL (ref 164–446)
PMV BLD AUTO: 10.5 FL (ref 9–12.9)
POTASSIUM SERPL-SCNC: 3.4 MMOL/L (ref 3.6–5.5)
PROT SERPL-MCNC: 5.6 G/DL (ref 6–8.2)
RBC # BLD AUTO: 3.29 M/UL (ref 4.2–5.4)
SODIUM SERPL-SCNC: 142 MMOL/L (ref 135–145)
WBC # BLD AUTO: 10.7 K/UL (ref 4.8–10.8)

## 2021-12-20 PROCEDURE — 99232 SBSQ HOSP IP/OBS MODERATE 35: CPT | Performed by: HOSPITALIST

## 2021-12-20 PROCEDURE — 82962 GLUCOSE BLOOD TEST: CPT | Mod: 91

## 2021-12-20 PROCEDURE — A9270 NON-COVERED ITEM OR SERVICE: HCPCS | Performed by: NURSE PRACTITIONER

## 2021-12-20 PROCEDURE — 700102 HCHG RX REV CODE 250 W/ 637 OVERRIDE(OP): Performed by: HOSPITALIST

## 2021-12-20 PROCEDURE — A9270 NON-COVERED ITEM OR SERVICE: HCPCS | Performed by: INTERNAL MEDICINE

## 2021-12-20 PROCEDURE — 700102 HCHG RX REV CODE 250 W/ 637 OVERRIDE(OP): Performed by: NURSE PRACTITIONER

## 2021-12-20 PROCEDURE — 700111 HCHG RX REV CODE 636 W/ 250 OVERRIDE (IP): Performed by: INTERNAL MEDICINE

## 2021-12-20 PROCEDURE — 83735 ASSAY OF MAGNESIUM: CPT

## 2021-12-20 PROCEDURE — 94669 MECHANICAL CHEST WALL OSCILL: CPT

## 2021-12-20 PROCEDURE — 94660 CPAP INITIATION&MGMT: CPT

## 2021-12-20 PROCEDURE — 84100 ASSAY OF PHOSPHORUS: CPT

## 2021-12-20 PROCEDURE — 700105 HCHG RX REV CODE 258: Performed by: INTERNAL MEDICINE

## 2021-12-20 PROCEDURE — 85025 COMPLETE CBC W/AUTO DIFF WBC: CPT

## 2021-12-20 PROCEDURE — 700111 HCHG RX REV CODE 636 W/ 250 OVERRIDE (IP): Performed by: HOSPITALIST

## 2021-12-20 PROCEDURE — 80053 COMPREHEN METABOLIC PANEL: CPT

## 2021-12-20 PROCEDURE — 700102 HCHG RX REV CODE 250 W/ 637 OVERRIDE(OP): Performed by: INTERNAL MEDICINE

## 2021-12-20 PROCEDURE — 700101 HCHG RX REV CODE 250: Performed by: INTERNAL MEDICINE

## 2021-12-20 PROCEDURE — A9270 NON-COVERED ITEM OR SERVICE: HCPCS | Performed by: HOSPITALIST

## 2021-12-20 PROCEDURE — 770000 HCHG ROOM/CARE - INTERMEDIATE ICU *

## 2021-12-20 RX ORDER — POTASSIUM CHLORIDE 20 MEQ/1
40 TABLET, EXTENDED RELEASE ORAL 2 TIMES DAILY
Status: COMPLETED | OUTPATIENT
Start: 2021-12-20 | End: 2021-12-20

## 2021-12-20 RX ORDER — MAGNESIUM SULFATE HEPTAHYDRATE 40 MG/ML
4 INJECTION, SOLUTION INTRAVENOUS ONCE
Status: COMPLETED | OUTPATIENT
Start: 2021-12-20 | End: 2021-12-20

## 2021-12-20 RX ADMIN — OMEPRAZOLE 40 MG: 20 CAPSULE, DELAYED RELEASE ORAL at 06:15

## 2021-12-20 RX ADMIN — GABAPENTIN 200 MG: 100 CAPSULE ORAL at 06:14

## 2021-12-20 RX ADMIN — ACETAMINOPHEN 650 MG: 325 TABLET, FILM COATED ORAL at 21:18

## 2021-12-20 RX ADMIN — GABAPENTIN 200 MG: 100 CAPSULE ORAL at 21:18

## 2021-12-20 RX ADMIN — INSULIN HUMAN 3 UNITS: 100 INJECTION, SOLUTION PARENTERAL at 13:06

## 2021-12-20 RX ADMIN — FLUDROCORTISONE ACETATE 0.1 MG: 0.1 TABLET ORAL at 17:26

## 2021-12-20 RX ADMIN — HYDROCORTISONE 10 MG: 20 TABLET ORAL at 17:26

## 2021-12-20 RX ADMIN — VANCOMYCIN HYDROCHLORIDE 1250 MG: 5 INJECTION, POWDER, LYOPHILIZED, FOR SOLUTION INTRAVENOUS at 17:27

## 2021-12-20 RX ADMIN — HYDROCORTISONE 20 MG: 20 TABLET ORAL at 06:16

## 2021-12-20 RX ADMIN — DEXAMETHASONE 0.5 MG: 1 TABLET ORAL at 08:33

## 2021-12-20 RX ADMIN — CARVEDILOL 25 MG: 25 TABLET, FILM COATED ORAL at 17:26

## 2021-12-20 RX ADMIN — MAGNESIUM SULFATE HEPTAHYDRATE 4 G: 40 INJECTION, SOLUTION INTRAVENOUS at 12:16

## 2021-12-20 RX ADMIN — INSULIN HUMAN 5 UNITS: 100 INJECTION, SUSPENSION SUBCUTANEOUS at 18:18

## 2021-12-20 RX ADMIN — GABAPENTIN 200 MG: 100 CAPSULE ORAL at 15:03

## 2021-12-20 RX ADMIN — ALTEPLASE 2 MG: 2.2 INJECTION, POWDER, LYOPHILIZED, FOR SOLUTION INTRAVENOUS at 15:35

## 2021-12-20 RX ADMIN — INSULIN HUMAN 5 UNITS: 100 INJECTION, SUSPENSION SUBCUTANEOUS at 09:06

## 2021-12-20 RX ADMIN — LIOTHYRONINE SODIUM 25 MCG: 25 TABLET ORAL at 08:33

## 2021-12-20 RX ADMIN — POTASSIUM CHLORIDE 40 MEQ: 1500 TABLET, EXTENDED RELEASE ORAL at 12:15

## 2021-12-20 RX ADMIN — FUROSEMIDE 20 MG: 20 TABLET ORAL at 06:16

## 2021-12-20 RX ADMIN — DEXAMETHASONE 0.5 MG: 1 TABLET ORAL at 17:27

## 2021-12-20 RX ADMIN — ENOXAPARIN SODIUM 100 MG: 100 INJECTION SUBCUTANEOUS at 06:14

## 2021-12-20 RX ADMIN — LIDOCAINE 1 PATCH: 50 PATCH TOPICAL at 08:35

## 2021-12-20 RX ADMIN — Medication 5 MG: at 21:18

## 2021-12-20 RX ADMIN — FLUDROCORTISONE ACETATE 0.1 MG: 0.1 TABLET ORAL at 06:16

## 2021-12-20 RX ADMIN — CARVEDILOL 25 MG: 25 TABLET, FILM COATED ORAL at 08:35

## 2021-12-20 RX ADMIN — CEFTRIAXONE SODIUM 2 G: 2 INJECTION, POWDER, FOR SOLUTION INTRAMUSCULAR; INTRAVENOUS at 06:13

## 2021-12-20 RX ADMIN — LEVOTHYROXINE SODIUM 75 MCG: 0.07 TABLET ORAL at 06:16

## 2021-12-20 RX ADMIN — DOXAZOSIN 2 MG: 2 TABLET ORAL at 06:15

## 2021-12-20 RX ADMIN — ENOXAPARIN SODIUM 100 MG: 100 INJECTION SUBCUTANEOUS at 17:26

## 2021-12-20 RX ADMIN — POTASSIUM CHLORIDE 40 MEQ: 1500 TABLET, EXTENDED RELEASE ORAL at 18:19

## 2021-12-20 ASSESSMENT — ENCOUNTER SYMPTOMS
NAUSEA: 0
SHORTNESS OF BREATH: 1
ABDOMINAL PAIN: 1
PALPITATIONS: 0
BLURRED VISION: 0
COUGH: 1
LOSS OF CONSCIOUSNESS: 0
WEAKNESS: 1
DIZZINESS: 0
HEADACHES: 0
CHILLS: 0
BACK PAIN: 0
VOMITING: 0
DIARRHEA: 0
FEVER: 0
DOUBLE VISION: 0

## 2021-12-20 ASSESSMENT — PAIN DESCRIPTION - PAIN TYPE
TYPE: ACUTE PAIN

## 2021-12-20 ASSESSMENT — PULMONARY FUNCTION TESTS: EPAP_CMH2O: 6

## 2021-12-20 NOTE — CARE PLAN
Problem: Hyperinflation  Goal: Prevent or improve atelectasis  Description: 1. Instruct incentive spirometry usage  2.  Perform hyperinflation therapy as indicated  Outcome: Progressing  Flowsheets (Taken 12/19/2021 1731)  Hyperinflation Protocol Goals/Outcome:   Greater Than 60% of Predicted I.S. Volume x 24 hrs   Improvement in Repeat CXR   Stable Vital Capacity x24 hrs and Patient Understands / uses I.S.  Hyperinflation Protocol Indications: Atelectasis Documented by Chest X-Ray       Respiratory Update    Treatment modality: IPV  Frequency: QID    IS value - 500ml  Noc BiPAP 12/6    Pt tolerating current treatments well with no adverse reactions.

## 2021-12-20 NOTE — DISCHARGE PLANNING
Care Transition Team Discharge Planning     Anticipated Discharge Disposition: d/c to [facility] Tuesday or Wednesday of this week      Action: Per hospitalist rounds for Intermediate Care Unit rooms, pt is able to d/c to LTAC tomorrow or the next day.     Lsw called liasion for [facility]. She will take a look at referral.     Barriers to Discharge: not medically clear/stable     Plan: Lsw will continue to follow, and assist w/ d/c planning.

## 2021-12-20 NOTE — DISCHARGE PLANNING
Referral sent per choice to 1)[facility] , 2)[facility] and [facility].

## 2021-12-20 NOTE — PROGRESS NOTES
"Hospital Medicine Daily Progress Note    Date of Service  12/20/2021    Chief Complaint  Donna Isaac is a 56 y.o. female admitted 12/1/2021 with abd/chest pain    Hospital Course  Ms Isaac has a complicated past medical history that includes traumatic brain injury, congestive heart failure and [place]'s disease.  She presented the emergency room on 12/1/2021 with complaints of abdominal pain.  Patient was found to have a pulmonary embolism and acute pancreatitis.  Patient started on heparin drip and admitted to the hospital she subsequently developed shock,  she was started on pressors and orders were changed to admit to the ICU.  While waiting for transfer the patient had cardiac arrest in the emergency room, CPR was per formed, she was intubated, she was resuscitated with massive transfusion protocol.  Patient has been supported with ventilator, and IVC filter has been placed 12/6 , her renal function has improved and she was extubated on 12/13/2021.    Interval Problem Update  Pt feels \"OK\" today.  Up to chair but had to do so via sliding board.  Has not had the strength to stand with therapy or staff.  conts with some SOB; about the same    AFebrile  Sinus   SBP 100s  3 LNC, BiPAP x 1 hr overnight  Tolerating po  +BM  UOP 2.9 litres/24hrs/-20 on admission on po lasix    I have personally seen and examined the patient at bedside. I discussed the plan of care with patient, bedside RN and pharmacy.    Consultants/Specialty  general surgery and nephrology    Code Status  DNAR/DNI    Disposition  Patient is not medically cleared.   Anticipate discharge to to an inpatient rehabilitation hospital.  I have placed the appropriate orders for post-discharge needs.    Review of Systems  Review of Systems   Constitutional: Negative for chills and fever.   Eyes: Negative for blurred vision and double vision.   Respiratory: Positive for cough and shortness of breath.    Cardiovascular: Positive for leg " swelling. Negative for chest pain and palpitations.   Gastrointestinal: Positive for abdominal pain. Negative for diarrhea, nausea and vomiting.   Genitourinary: Negative for dysuria and urgency.   Musculoskeletal: Negative for back pain.   Skin: Negative for rash.   Neurological: Positive for weakness. Negative for dizziness, loss of consciousness and headaches.        Physical Exam  Temp:  [36.3 °C (97.4 °F)-37 °C (98.6 °F)] 37 °C (98.6 °F)  Pulse:  [] 96  Resp:  [14-34] 16  BP: ()/(58-93) 112/79  SpO2:  [94 %-100 %] 98 %    Physical Exam  Vitals reviewed.   Constitutional:       General: She is not in acute distress.     Appearance: She is well-developed. She is obese. She is not diaphoretic.   HENT:      Head: Normocephalic and atraumatic.   Neck:      Vascular: No JVD.   Cardiovascular:      Rate and Rhythm: Normal rate and regular rhythm.   Pulmonary:      Effort: Pulmonary effort is normal. No respiratory distress.      Breath sounds: No stridor. No wheezing or rales.   Abdominal:      Palpations: Abdomen is soft.      Tenderness: There is no abdominal tenderness. There is no guarding or rebound.   Musculoskeletal:      Right lower leg: Edema present.      Left lower leg: Edema present.      Comments: 3+ pitting edema to mid thigh   Skin:     General: Skin is warm and dry.      Findings: No rash.   Neurological:      General: No focal deficit present.      Mental Status: She is alert and oriented to person, place, and time. Mental status is at baseline.   Psychiatric:         Mood and Affect: Mood normal.         Thought Content: Thought content normal.         Fluids    Intake/Output Summary (Last 24 hours) at 12/20/2021 0711  Last data filed at 12/20/2021 0500  Gross per 24 hour   Intake 401.25 ml   Output 2950 ml   Net -2548.75 ml       Laboratory  Recent Labs     12/18/21  0217 12/19/21  0120 12/20/21  0329   WBC 14.7* 15.1* 10.7   RBC 2.99* 3.09* 3.29*   HEMOGLOBIN 8.0* 8.3* 8.9*   HEMATOCRIT  27.5* 28.9* 30.3*   MCV 92.0 93.5 92.1   MCH 26.8* 26.9* 27.1   MCHC 29.1* 28.7* 29.4*   RDW 74.1* 74.4* 72.5*   PLATELETCT 366 341 337   MPV 10.7 10.7 10.5     Recent Labs     12/18/21  0217 12/19/21  0120 12/20/21  0329   SODIUM 146* 141 142   POTASSIUM 3.6 3.2* 3.4*   CHLORIDE 112 108 107   CO2 27 24 25   GLUCOSE 125* 231* 109*   BUN 11 8 8   CREATININE 0.47* 0.54 0.42*   CALCIUM 8.2* 8.2* 8.5                   Imaging  DX-CHEST-PORTABLE (1 VIEW)   Final Result         1.  Mild edema and/or infiltrate, particularly on the right, stable since prior study   2.  Cardiomegaly      DX-CHEST-PORTABLE (1 VIEW)   Final Result         1.  Mild edema and/or infiltrate, particularly on the right   2.  Cardiomegaly      DX-CHEST-PORTABLE (1 VIEW)   Final Result         1.  Bibasilar atelectasis or early infiltrate   2.  Cardiomegaly      DX-CHEST-PORTABLE (1 VIEW)   Final Result      1.  Bibasilar underinflation atelectasis which could obscure an additional process. This is unchanged.   2.  Suspect small LEFT pleural effusion      DX-CHEST-PORTABLE (1 VIEW)   Final Result         1.  Bilateral basilar atelectasis, no focal infiltrate   2.  Cardiomegaly      DX-CHEST-PORTABLE (1 VIEW)   Final Result         1.  Bilateral basilar atelectasis, no focal infiltrate   2.  Cardiomegaly      DX-CHEST-PORTABLE (1 VIEW)   Final Result         1.  Left lower lobe atelectasis or infiltrates, similar compared to prior study.   2.  Cardiomegaly      DX-CHEST-PORTABLE (1 VIEW)   Final Result         1.  Left lower lobe atelectasis or infiltrates, similar compared to prior study.   2.  Cardiomegaly      DX-CHEST-PORTABLE (1 VIEW)   Final Result         1.  Left lower lobe atelectasis or infiltrates, similar compared to prior study.   2.  Cardiomegaly      IR-INSERT IVC FILTER WITH IG & SI   Final Result      Ultrasound and fluoroscopic guided placement of an Option Elite IVC filter.      DX-CHEST-PORTABLE (1 VIEW)   Final Result         1.   Pulmonary edema and/or infiltrates are identified, which are stable since the prior exam.   2.  Cardiomegaly      DX-CHEST-PORTABLE (1 VIEW)   Final Result      Tubes and lines as described above.      Bibasilar atelectasis with small pleural effusions.      DX-CHEST-PORTABLE (1 VIEW)   Final Result         1. No significant interval change.      [id number] VIEW   Final Result      Cortrak feeding tube tip projects in the region of the mid stomach.      DX-CHEST-PORTABLE (1 VIEW)   Final Result      Placement of right IJ dialysis catheter.      Otherwise stable findings.      DX-CHEST-PORTABLE (1 VIEW)   Final Result         1.  Pulmonary edema and/or infiltrates are identified, which are stable since the prior exam.   2.  Cardiomegaly      DX-CHEST-PORTABLE (1 VIEW)   Final Result         1.  Pulmonary edema and/or infiltrates are identified, which are stable since the prior exam.   2.  Cardiomegaly      DX-CHEST-FOR LINE PLACEMENT Perform procedure in: Patient's Room   Final Result      Interval placement of a left IJ central venous catheter with the tip overlying the right atrium.      US-EXTREMITY VENOUS LOWER BILAT   Final Result      CTA ABDOMEN PELVIS W & W/O POST PROCESS   Final Result      1.  Small to moderate amount of hemoperitoneum in the abdomen and pelvis. No active extravasation is identified.   2.  No aortic aneurysm or dissection.   3.  Peripancreatic stranding can be related to pancreatitis.   4.  Cardiomegaly.   5.  Hepatic steatosis.   6.  Status post cholecystectomy.   7.  Atrophic kidneys bilaterally.   8.  Status post gastric bypass surgery.   9.  Mild wall thickening of the second portion of the duodenum with surrounding inflammation. This can be seen in duodenitis/peptic ulcer disease.   10.  Colonic diverticulosis.   11.  Bibasilar atelectasis/consolidation. Groundglass opacities with septal thickening at the lung bases can be seen in the setting of edema or multifocal pneumonia.   12.   Bilateral anterior rib fractures.      Findings discussed with Dr. Rush.      DX-CHEST-PORTABLE (1 VIEW)   Final Result      1.  Endotracheal tube present.      2.  Cardiomegaly with interstitial prominence.      EC-ECHOCARDIOGRAM COMPLETE W/ CONT   Final Result      CT-CTA CHEST PULMONARY ARTERY W/ RECONS   Final Result      1.  Positive for pulmonary embolism located in multiple segmental and subsegmental branches      2.  Minimal elevation of RV/LV ratio      3.  Mild atelectasis      4.  Left PICC line present and appears appropriately located      5.  Findings were discussed with BLAINE VEGA on 12/1/2021 5:35 PM.                  CT-ABDOMEN-PELVIS WITH   Final Result      1.  Apparent inflammation of the pancreas with surrounding fat stranding and fluid suggesting pancreatitis. Recommend correlation with lipase.      2.  Diverticulosis without diverticulitis.      3.  Hepatic steatosis.      US-EXTREMITY VENOUS UPPER UNILAT LEFT   Final Result      DX-CHEST-PORTABLE (1 VIEW)   Final Result      1.  No acute cardiopulmonary abnormality identified.      2.  Left PICC line appears appropriately located           Assessment/Plan  * Cardiac arrest (HCC)  Assessment & Plan  Secondary to hemorrhagic shock    Pulmonary hypertension (HCC)  Assessment & Plan  ROSSI, Hx PE  Cont O2 support, diuresis    Hypokalemia  Assessment & Plan  Follow and replace  Also addressing Mg    Paroxysmal atrial fibrillation (HCC)  Assessment & Plan  Currently in sinus  Rate/rythm control with Carvedilol  anticoagulation currently with enoxaparin with plan to transition to DOAC on DC    Hypocalcemia  Assessment & Plan  S/p replacement    Hypomagnesemia  Assessment & Plan  1.6 this am; giving 2g IV  Repeat lab in am    Hemoperitoneum  Assessment & Plan  In setting of acute pancreatitis in a pt on full dose anticoagulation  Stable  Follow daily H&H    DVT (deep venous thrombosis) (HCC)  Assessment & Plan  IVC in place  Enoxaparin at full  dose  Eventual DOAC    Shock (HCC)- (present on admission)  Assessment & Plan  Hemorrhagic from intra-abd bleed  Now resolved  Cont to follow H&H  Has been re-challenged with Enoxaparin 100mg/kg and is tolerating thus far    Acute pancreatitis  Assessment & Plan  Idiopathic, IV fluids, symptomatic management, bowel rest    PE (pulmonary thromboembolism) (HCC)- (present on admission)  Assessment & Plan  Tolerating enoxaparin 100mg/kg  Has IVC in place   Transition to po DOAC as DC plan becomes more clear and if no further issues with bleeding    Debilitated patient  Assessment & Plan  PT/OT  Needs Rehab however at least at this point would not tolerate 3 hrs of daily therapy    Acute blood loss anemia- (present on admission)  Assessment & Plan  Secondary to retroperitoneal bleed    Thrombocytopenia (HCC)- (present on admission)  Assessment & Plan  Resolved  Cont to follow    Adrenal insufficiency ([place]'s disease) (HCC)- (present on admission)  Assessment & Plan  On home regimen per her endocrinologist: hydrocortisone, dexamthasone, florinef    Elevated LFTs- (present on admission)  Assessment & Plan  Have stabilized    MRSA and E. coli sinusitis  Assessment & Plan  Patient reports daily IV infusion of antibiotics for MRSA sinusitis  Cont while in house  Get records    Acute kidney injury (HCC)- (present on admission)  Assessment & Plan  Renal function has since normalized    Primary hypertension- (present on admission)  Assessment & Plan  Currently on losarten 100, carvedilol 25, doxazosin 2, amiloride 25      Hypothyroidism- (present on admission)  Assessment & Plan  Liothyronine 25  Levothyroxine 75    Acute respiratory failure with hypoxia (HCC)- (present on admission)  Assessment & Plan  Intubated due to hemorrhagic shock  Extubated 12/13  Still quite frail  DNR/I at present  IS  Mobilize  O2/RT protocols  Follow volume status carefully       VTE prophylaxis: therapeutic anticoagulation with enoxaparin    I  have performed a physical exam and reviewed and updated ROS and Plan today (12/20/2021). In review of yesterday's note (12/19/2021), there are no changes except as documented above.

## 2021-12-20 NOTE — CARE PLAN
Respiratory Update    Treatment modality: IPV    Frequency:QID /in order to prevent or improve atelectasis./ 4 L SNC     Noc BiPAP 12/6 40%       Pt tolerating current treatments well with no adverse reactions.

## 2021-12-20 NOTE — CARE PLAN
The patient is Watcher - Medium risk of patient condition declining or worsening    Shift Goals  Clinical Goals: mobility out of bed  Patient Goals: rest  Family Goals: no family present    Progress made toward(s) clinical / shift goals: down to 2L NC, cardiac chair for 5 hrs    Patient is not progressing towards the following goals: increased mobility      Problem: Psychosocial  Goal: Patient's level of anxiety will decrease  Outcome: Met     Problem: Hemodynamics  Goal: Patient's hemodynamics, fluid balance and neurologic status will be stable or improve  Outcome: Met  Note: Stable vital signs       Problem: Respiratory  Goal: Patient will achieve/maintain optimum respiratory ventilation and gas exchange  Outcome: Progressing  Note: Down to 2L NC, wheezing intermittently, encouraging IS, weak effort

## 2021-12-20 NOTE — CARE PLAN
Problem: Nutritional:  Goal: Achieve adequate nutritional intake  Description: Patient will consume >50% of meals.   Outcome: Progressing   Variable intake noted since nutrition support stopped - not adequate yet.  Will add Boost 1x/day to optimize intake.  Rd(s) continue to follow.

## 2021-12-21 ENCOUNTER — APPOINTMENT (OUTPATIENT)
Dept: RADIOLOGY | Facility: MEDICAL CENTER | Age: 56
DRG: 981 | End: 2021-12-21
Attending: HOSPITALIST
Payer: MEDICARE

## 2021-12-21 LAB
ALBUMIN SERPL BCP-MCNC: 2.3 G/DL (ref 3.2–4.9)
ALBUMIN/GLOB SERPL: 1 G/DL
ALP SERPL-CCNC: 145 U/L (ref 30–99)
ALT SERPL-CCNC: 30 U/L (ref 2–50)
ANION GAP SERPL CALC-SCNC: 8 MMOL/L (ref 7–16)
AST SERPL-CCNC: 16 U/L (ref 12–45)
BASOPHILS # BLD AUTO: 0.3 % (ref 0–1.8)
BASOPHILS # BLD: 0.02 K/UL (ref 0–0.12)
BILIRUB SERPL-MCNC: 0.3 MG/DL (ref 0.1–1.5)
BUN SERPL-MCNC: 8 MG/DL (ref 8–22)
CALCIUM SERPL-MCNC: 7.9 MG/DL (ref 8.5–10.5)
CHLORIDE SERPL-SCNC: 111 MMOL/L (ref 96–112)
CO2 SERPL-SCNC: 26 MMOL/L (ref 20–33)
CREAT SERPL-MCNC: 0.41 MG/DL (ref 0.5–1.4)
EOSINOPHIL # BLD AUTO: 0.08 K/UL (ref 0–0.51)
EOSINOPHIL NFR BLD: 1.3 % (ref 0–6.9)
ERYTHROCYTE [DISTWIDTH] IN BLOOD BY AUTOMATED COUNT: 78.3 FL (ref 35.9–50)
GLOBULIN SER CALC-MCNC: 2.4 G/DL (ref 1.9–3.5)
GLUCOSE BLD-MCNC: 105 MG/DL (ref 65–99)
GLUCOSE BLD-MCNC: 128 MG/DL (ref 65–99)
GLUCOSE BLD-MCNC: 210 MG/DL (ref 65–99)
GLUCOSE BLD-MCNC: 80 MG/DL (ref 65–99)
GLUCOSE SERPL-MCNC: 86 MG/DL (ref 65–99)
HCT VFR BLD AUTO: 27.3 % (ref 37–47)
HGB BLD-MCNC: 7.9 G/DL (ref 12–16)
IMM GRANULOCYTES # BLD AUTO: 0.1 K/UL (ref 0–0.11)
IMM GRANULOCYTES NFR BLD AUTO: 1.6 % (ref 0–0.9)
LYMPHOCYTES # BLD AUTO: 1.02 K/UL (ref 1–4.8)
LYMPHOCYTES NFR BLD: 16.7 % (ref 22–41)
MAGNESIUM SERPL-MCNC: 2 MG/DL (ref 1.5–2.5)
MCH RBC QN AUTO: 27.2 PG (ref 27–33)
MCHC RBC AUTO-ENTMCNC: 28.9 G/DL (ref 33.6–35)
MCV RBC AUTO: 94.1 FL (ref 81.4–97.8)
MONOCYTES # BLD AUTO: 0.5 K/UL (ref 0–0.85)
MONOCYTES NFR BLD AUTO: 8.2 % (ref 0–13.4)
NEUTROPHILS # BLD AUTO: 4.38 K/UL (ref 2–7.15)
NEUTROPHILS NFR BLD: 71.9 % (ref 44–72)
NRBC # BLD AUTO: 0 K/UL
NRBC BLD-RTO: 0 /100 WBC
PHOSPHATE SERPL-MCNC: 3.6 MG/DL (ref 2.5–4.5)
PLATELET # BLD AUTO: 298 K/UL (ref 164–446)
PMV BLD AUTO: 10.6 FL (ref 9–12.9)
POTASSIUM SERPL-SCNC: 3.7 MMOL/L (ref 3.6–5.5)
PROT SERPL-MCNC: 4.7 G/DL (ref 6–8.2)
RBC # BLD AUTO: 2.9 M/UL (ref 4.2–5.4)
SODIUM SERPL-SCNC: 145 MMOL/L (ref 135–145)
WBC # BLD AUTO: 6.1 K/UL (ref 4.8–10.8)

## 2021-12-21 PROCEDURE — 83735 ASSAY OF MAGNESIUM: CPT

## 2021-12-21 PROCEDURE — 700111 HCHG RX REV CODE 636 W/ 250 OVERRIDE (IP): Performed by: INTERNAL MEDICINE

## 2021-12-21 PROCEDURE — 85025 COMPLETE CBC W/AUTO DIFF WBC: CPT

## 2021-12-21 PROCEDURE — 99233 SBSQ HOSP IP/OBS HIGH 50: CPT | Performed by: HOSPITALIST

## 2021-12-21 PROCEDURE — 700105 HCHG RX REV CODE 258: Performed by: INTERNAL MEDICINE

## 2021-12-21 PROCEDURE — 94669 MECHANICAL CHEST WALL OSCILL: CPT

## 2021-12-21 PROCEDURE — 700101 HCHG RX REV CODE 250: Performed by: INTERNAL MEDICINE

## 2021-12-21 PROCEDURE — 84100 ASSAY OF PHOSPHORUS: CPT

## 2021-12-21 PROCEDURE — A9270 NON-COVERED ITEM OR SERVICE: HCPCS | Performed by: INTERNAL MEDICINE

## 2021-12-21 PROCEDURE — 770000 HCHG ROOM/CARE - INTERMEDIATE ICU *

## 2021-12-21 PROCEDURE — 700102 HCHG RX REV CODE 250 W/ 637 OVERRIDE(OP): Performed by: INTERNAL MEDICINE

## 2021-12-21 PROCEDURE — 80053 COMPREHEN METABOLIC PANEL: CPT

## 2021-12-21 PROCEDURE — 94660 CPAP INITIATION&MGMT: CPT

## 2021-12-21 PROCEDURE — 73620 X-RAY EXAM OF FOOT: CPT | Mod: LT

## 2021-12-21 PROCEDURE — 82962 GLUCOSE BLOOD TEST: CPT | Mod: 91

## 2021-12-21 RX ADMIN — DOXAZOSIN 2 MG: 2 TABLET ORAL at 06:40

## 2021-12-21 RX ADMIN — CARVEDILOL 25 MG: 25 TABLET, FILM COATED ORAL at 08:23

## 2021-12-21 RX ADMIN — GABAPENTIN 200 MG: 100 CAPSULE ORAL at 06:40

## 2021-12-21 RX ADMIN — FUROSEMIDE 20 MG: 20 TABLET ORAL at 06:40

## 2021-12-21 RX ADMIN — LOSARTAN POTASSIUM 100 MG: 50 TABLET, FILM COATED ORAL at 11:44

## 2021-12-21 RX ADMIN — LEVOTHYROXINE SODIUM 75 MCG: 0.07 TABLET ORAL at 06:40

## 2021-12-21 RX ADMIN — LIDOCAINE 1 PATCH: 50 PATCH TOPICAL at 09:50

## 2021-12-21 RX ADMIN — LIOTHYRONINE SODIUM 25 MCG: 25 TABLET ORAL at 08:23

## 2021-12-21 RX ADMIN — HYDROCORTISONE 10 MG: 20 TABLET ORAL at 17:08

## 2021-12-21 RX ADMIN — OXYCODONE 5 MG: 5 TABLET ORAL at 12:19

## 2021-12-21 RX ADMIN — ENOXAPARIN SODIUM 100 MG: 100 INJECTION SUBCUTANEOUS at 17:07

## 2021-12-21 RX ADMIN — GABAPENTIN 200 MG: 100 CAPSULE ORAL at 20:30

## 2021-12-21 RX ADMIN — ACETAMINOPHEN 650 MG: 325 TABLET, FILM COATED ORAL at 09:49

## 2021-12-21 RX ADMIN — DEXAMETHASONE 0.5 MG: 1 TABLET ORAL at 06:41

## 2021-12-21 RX ADMIN — INSULIN HUMAN 5 UNITS: 100 INJECTION, SUSPENSION SUBCUTANEOUS at 06:31

## 2021-12-21 RX ADMIN — INSULIN HUMAN 4 UNITS: 100 INJECTION, SOLUTION PARENTERAL at 20:30

## 2021-12-21 RX ADMIN — CARVEDILOL 25 MG: 25 TABLET, FILM COATED ORAL at 17:07

## 2021-12-21 RX ADMIN — FLUDROCORTISONE ACETATE 0.1 MG: 0.1 TABLET ORAL at 06:40

## 2021-12-21 RX ADMIN — ACETAMINOPHEN 650 MG: 325 TABLET, FILM COATED ORAL at 17:32

## 2021-12-21 RX ADMIN — VANCOMYCIN HYDROCHLORIDE 1250 MG: 5 INJECTION, POWDER, LYOPHILIZED, FOR SOLUTION INTRAVENOUS at 17:32

## 2021-12-21 RX ADMIN — CEFTRIAXONE SODIUM 2 G: 2 INJECTION, POWDER, FOR SOLUTION INTRAMUSCULAR; INTRAVENOUS at 06:39

## 2021-12-21 RX ADMIN — OMEPRAZOLE 40 MG: 20 CAPSULE, DELAYED RELEASE ORAL at 06:40

## 2021-12-21 RX ADMIN — GABAPENTIN 200 MG: 100 CAPSULE ORAL at 15:01

## 2021-12-21 RX ADMIN — ACETAMINOPHEN 650 MG: 325 TABLET, FILM COATED ORAL at 01:19

## 2021-12-21 RX ADMIN — HYDROCORTISONE 20 MG: 20 TABLET ORAL at 06:40

## 2021-12-21 RX ADMIN — DEXAMETHASONE 0.5 MG: 1 TABLET ORAL at 17:32

## 2021-12-21 RX ADMIN — OXYCODONE 5 MG: 5 TABLET ORAL at 20:30

## 2021-12-21 RX ADMIN — FLUDROCORTISONE ACETATE 0.1 MG: 0.1 TABLET ORAL at 17:07

## 2021-12-21 RX ADMIN — ENOXAPARIN SODIUM 100 MG: 100 INJECTION SUBCUTANEOUS at 06:38

## 2021-12-21 ASSESSMENT — PATIENT HEALTH QUESTIONNAIRE - PHQ9
1. LITTLE INTEREST OR PLEASURE IN DOING THINGS: NOT AT ALL
SUM OF ALL RESPONSES TO PHQ9 QUESTIONS 1 AND 2: 0
1. LITTLE INTEREST OR PLEASURE IN DOING THINGS: NOT AT ALL
SUM OF ALL RESPONSES TO PHQ9 QUESTIONS 1 AND 2: 0
2. FEELING DOWN, DEPRESSED, IRRITABLE, OR HOPELESS: NOT AT ALL
2. FEELING DOWN, DEPRESSED, IRRITABLE, OR HOPELESS: NOT AT ALL

## 2021-12-21 ASSESSMENT — ENCOUNTER SYMPTOMS
NAUSEA: 0
DOUBLE VISION: 0
LOSS OF CONSCIOUSNESS: 0
DIZZINESS: 0
HEADACHES: 0
ABDOMINAL PAIN: 1
FEVER: 0
COUGH: 1
DIARRHEA: 0
BACK PAIN: 0
SHORTNESS OF BREATH: 1
CHILLS: 0
BLURRED VISION: 0
VOMITING: 0
PALPITATIONS: 0
MYALGIAS: 1
WEAKNESS: 1

## 2021-12-21 ASSESSMENT — PAIN DESCRIPTION - PAIN TYPE
TYPE: CHRONIC PAIN
TYPE: ACUTE PAIN
TYPE: ACUTE PAIN;CHRONIC PAIN
TYPE: ACUTE PAIN

## 2021-12-21 NOTE — PROGRESS NOTES
Hospital Medicine Daily Progress Note    Date of Service  12/21/2021    Chief Complaint  Donna Isaac is a 56 y.o. female admitted 12/1/2021 with abd/chest pain    Hospital Course  Ms Isaac has a complicated past medical history that includes traumatic brain injury, congestive heart failure and Valentín's disease.  She presented the emergency room on 12/1/2021 with complaints of abdominal pain.  Patient was found to have a pulmonary embolism and acute pancreatitis.  Patient started on heparin drip and admitted to the hospital she subsequently developed shock,  she was started on pressors and orders were changed to admit to the ICU.  While waiting for transfer the patient had cardiac arrest in the emergency room, CPR was per formed, she was intubated, she was resuscitated with massive transfusion protocol.  Patient has been supported with ventilator, and IVC filter has been placed 12/6 , her renal function has improved and she was extubated on 12/13/2021.  The patient diagnosed with left upper venous thrombosis including subclavian vein, left upper extremity venous thrombosis axillary vein, as well as acute deep vein thrombosis in the left posterior tibial vein.    Interval Problem Update  Patient seen and examined today.    Patient tolerating treatment and therapies.  All Data, Medication data reviewed.  Case discussed with nursing as available.  Plan of Care reviewed with patient and notified of changes.  12/21 the patient is afebrile, she is found with a pulse rate around 80, respirations unlabored in the 20s, 3 L nasal cannula oxygen, laboratory data indicated slightly low hemoglobin at 7.9, no evidence of acute bleeding, chemistry overall benign, slightly elevated alk phos, low albumin at 2.3, patient is complaining of left foot and ankle pain, x-ray without bony abnormality, significant soft tissue swelling.    I have personally seen and examined the patient at bedside. I discussed the plan of care  with patient, bedside RN and pharmacy.    Consultants/Specialty  general surgery and nephrology    Code Status  DNAR/DNI    Disposition  Patient is not medically cleared.   Anticipate discharge to to an inpatient rehabilitation hospital.  I have placed the appropriate orders for post-discharge needs.    Review of Systems  Review of Systems   Constitutional: Negative for chills and fever.   Eyes: Negative for blurred vision and double vision.   Respiratory: Positive for cough and shortness of breath.    Cardiovascular: Positive for chest pain and leg swelling. Negative for palpitations.   Gastrointestinal: Positive for abdominal pain. Negative for diarrhea, nausea and vomiting.   Genitourinary: Negative for dysuria and urgency.   Musculoskeletal: Positive for joint pain and myalgias. Negative for back pain.   Skin: Negative for rash.   Neurological: Positive for weakness. Negative for dizziness, loss of consciousness and headaches.        Physical Exam  Temp:  [36.3 °C (97.3 °F)-36.8 °C (98.3 °F)] 36.6 °C (97.9 °F)  Pulse:  [] 90  Resp:  [12-30] 24  BP: ()/(58-89) 121/76  SpO2:  [91 %-100 %] 95 %    Physical Exam  Vitals reviewed.   Constitutional:       General: She is not in acute distress.     Appearance: She is well-developed. She is obese. She is not diaphoretic.   HENT:      Head: Normocephalic and atraumatic.   Neck:      Vascular: No JVD.   Cardiovascular:      Rate and Rhythm: Normal rate and regular rhythm.      Comments: Chest pain with pressure  Pulmonary:      Effort: Pulmonary effort is normal. No respiratory distress.      Breath sounds: No stridor. No wheezing or rales.   Abdominal:      Palpations: Abdomen is soft.      Tenderness: There is no abdominal tenderness. There is no guarding or rebound.   Musculoskeletal:      Right lower leg: Edema present.      Left lower leg: Edema present.      Comments: 3+ pitting edema to mid thigh   Skin:     General: Skin is warm and dry.      Findings:  No rash.   Neurological:      General: No focal deficit present.      Mental Status: She is alert and oriented to person, place, and time. Mental status is at baseline.   Psychiatric:         Mood and Affect: Mood normal.         Thought Content: Thought content normal.         Fluids    Intake/Output Summary (Last 24 hours) at 12/21/2021 0752  Last data filed at 12/21/2021 0400  Gross per 24 hour   Intake 1200 ml   Output 2850 ml   Net -1650 ml       Laboratory  Recent Labs     12/19/21  0120 12/20/21  0329 12/21/21  0445   WBC 15.1* 10.7 6.1   RBC 3.09* 3.29* 2.90*   HEMOGLOBIN 8.3* 8.9* 7.9*   HEMATOCRIT 28.9* 30.3* 27.3*   MCV 93.5 92.1 94.1   MCH 26.9* 27.1 27.2   MCHC 28.7* 29.4* 28.9*   RDW 74.4* 72.5* 78.3*   PLATELETCT 341 337 298   MPV 10.7 10.5 10.6     Recent Labs     12/19/21  0120 12/20/21  0329 12/21/21  0445   SODIUM 141 142 145   POTASSIUM 3.2* 3.4* 3.7   CHLORIDE 108 107 111   CO2 24 25 26   GLUCOSE 231* 109* 86   BUN 8 8 8   CREATININE 0.54 0.42* 0.41*   CALCIUM 8.2* 8.5 7.9*                   Imaging  DX-CHEST-PORTABLE (1 VIEW)   Final Result         1.  Mild edema and/or infiltrate, particularly on the right, stable since prior study   2.  Cardiomegaly      DX-CHEST-PORTABLE (1 VIEW)   Final Result         1.  Mild edema and/or infiltrate, particularly on the right   2.  Cardiomegaly      DX-CHEST-PORTABLE (1 VIEW)   Final Result         1.  Bibasilar atelectasis or early infiltrate   2.  Cardiomegaly      DX-CHEST-PORTABLE (1 VIEW)   Final Result      1.  Bibasilar underinflation atelectasis which could obscure an additional process. This is unchanged.   2.  Suspect small LEFT pleural effusion      DX-CHEST-PORTABLE (1 VIEW)   Final Result         1.  Bilateral basilar atelectasis, no focal infiltrate   2.  Cardiomegaly      DX-CHEST-PORTABLE (1 VIEW)   Final Result         1.  Bilateral basilar atelectasis, no focal infiltrate   2.  Cardiomegaly      DX-CHEST-PORTABLE (1 VIEW)   Final Result          1.  Left lower lobe atelectasis or infiltrates, similar compared to prior study.   2.  Cardiomegaly      DX-CHEST-PORTABLE (1 VIEW)   Final Result         1.  Left lower lobe atelectasis or infiltrates, similar compared to prior study.   2.  Cardiomegaly      DX-CHEST-PORTABLE (1 VIEW)   Final Result         1.  Left lower lobe atelectasis or infiltrates, similar compared to prior study.   2.  Cardiomegaly      IR-INSERT IVC FILTER WITH IG & SI   Final Result      Ultrasound and fluoroscopic guided placement of an Option Elite IVC filter.      DX-CHEST-PORTABLE (1 VIEW)   Final Result         1.  Pulmonary edema and/or infiltrates are identified, which are stable since the prior exam.   2.  Cardiomegaly      DX-CHEST-PORTABLE (1 VIEW)   Final Result      Tubes and lines as described above.      Bibasilar atelectasis with small pleural effusions.      DX-CHEST-PORTABLE (1 VIEW)   Final Result         1. No significant interval change.      [id number] VIEW   Final Result      Cortrak feeding tube tip projects in the region of the mid stomach.      DX-CHEST-PORTABLE (1 VIEW)   Final Result      Placement of right IJ dialysis catheter.      Otherwise stable findings.      DX-CHEST-PORTABLE (1 VIEW)   Final Result         1.  Pulmonary edema and/or infiltrates are identified, which are stable since the prior exam.   2.  Cardiomegaly      DX-CHEST-PORTABLE (1 VIEW)   Final Result         1.  Pulmonary edema and/or infiltrates are identified, which are stable since the prior exam.   2.  Cardiomegaly      DX-CHEST-FOR LINE PLACEMENT Perform procedure in: Patient's Room   Final Result      Interval placement of a left IJ central venous catheter with the tip overlying the right atrium.      US-EXTREMITY VENOUS LOWER BILAT   Final Result      CTA ABDOMEN PELVIS W & W/O POST PROCESS   Final Result      1.  Small to moderate amount of hemoperitoneum in the abdomen and pelvis. No active extravasation is identified.   2.   No aortic aneurysm or dissection.   3.  Peripancreatic stranding can be related to pancreatitis.   4.  Cardiomegaly.   5.  Hepatic steatosis.   6.  Status post cholecystectomy.   7.  Atrophic kidneys bilaterally.   8.  Status post gastric bypass surgery.   9.  Mild wall thickening of the second portion of the duodenum with surrounding inflammation. This can be seen in duodenitis/peptic ulcer disease.   10.  Colonic diverticulosis.   11.  Bibasilar atelectasis/consolidation. Groundglass opacities with septal thickening at the lung bases can be seen in the setting of edema or multifocal pneumonia.   12.  Bilateral anterior rib fractures.      Findings discussed with Dr. Rush.      DX-CHEST-PORTABLE (1 VIEW)   Final Result      1.  Endotracheal tube present.      2.  Cardiomegaly with interstitial prominence.      EC-ECHOCARDIOGRAM COMPLETE W/ CONT   Final Result      CT-CTA CHEST PULMONARY ARTERY W/ RECONS   Final Result      1.  Positive for pulmonary embolism located in multiple segmental and subsegmental branches      2.  Minimal elevation of RV/LV ratio      3.  Mild atelectasis      4.  Left PICC line present and appears appropriately located      5.  Findings were discussed with BLAINE VEGA on 12/1/2021 5:35 PM.                  CT-ABDOMEN-PELVIS WITH   Final Result      1.  Apparent inflammation of the pancreas with surrounding fat stranding and fluid suggesting pancreatitis. Recommend correlation with lipase.      2.  Diverticulosis without diverticulitis.      3.  Hepatic steatosis.      US-EXTREMITY VENOUS UPPER UNILAT LEFT   Final Result      DX-CHEST-PORTABLE (1 VIEW)   Final Result      1.  No acute cardiopulmonary abnormality identified.      2.  Left PICC line appears appropriately located           Assessment/Plan  * Cardiac arrest (HCC)- (present on admission)  Assessment & Plan  Secondary to hemorrhagic shock    Acute respiratory failure with hypoxia (HCC)- (present on admission)  Assessment &  Plan  Intubated due to hemorrhagic shock  Extubated 12/13  Still quite frail  DNR/I at present  IS  Mobilize  O2/RT protocols  Follow volume status carefully    Pulmonary hypertension (HCC)- (present on admission)  Assessment & Plan  ROSSI, Hx PE  Cont O2 support, diuresis    Atelectasis  Assessment & Plan  Incentive spirometry, positional changes, alveolar recruiting    Hypokalemia- (present on admission)  Assessment & Plan  Follow and replace  Also addressing Mg    Paroxysmal atrial fibrillation (HCC)- (present on admission)  Assessment & Plan  Currently in sinus  Rate/rythm control with Carvedilol  anticoagulation currently with enoxaparin with plan to transition to DOAC on DC    Hypocalcemia- (present on admission)  Assessment & Plan  S/p replacement    Hypomagnesemia- (present on admission)  Assessment & Plan  Replace and recheck    Hemoperitoneum- (present on admission)  Assessment & Plan  In setting of acute pancreatitis in a pt on full dose anticoagulation  Stable  Follow daily H&H    DVT (deep venous thrombosis) ([facility])  Assessment & Plan  Upper extremity as well as lower extremity  IVC in place  Enoxaparin at full dose, close monitor  Eventual DOAC    Shock (HCC)- (present on admission)  Assessment & Plan  Hemorrhagic from intra-abd bleed  Now resolved  Cont to follow H&H  Has been re-challenged with Enoxaparin 100mg/kg and is tolerating thus far    Acute pancreatitis- (present on admission)  Assessment & Plan  Idiopathic, IV fluids, symptomatic management    PE (pulmonary thromboembolism) (HCC)- (present on admission)  Assessment & Plan  Tolerating enoxaparin 100mg/kg  Has IVC in place   Transition to po DOAC as DC plan becomes more clear and if no further issues with bleeding    Debilitated patient  Assessment & Plan  PT/OT  Needs Rehab however at least at this point would not tolerate 3 hrs of daily therapy  LTAC referral    Acute blood loss anemia- (present on admission)  Assessment & Plan  Secondary to  retroperitoneal bleed, monitor check iron levels    Thrombocytopenia (HCC)- (present on admission)  Assessment & Plan  Resolved  Cont to follow    Adrenal insufficiency (Valentín's disease) (HCC)- (present on admission)  Assessment & Plan  On home regimen per her endocrinologist: hydrocortisone, dexamthasone, florinef    Elevated LFTs- (present on admission)  Assessment & Plan  Have stabilized    MRSA and E. coli sinusitis- (present on admission)  Assessment & Plan  Patient reports daily IV infusion of antibiotics for MRSA sinusitis  Cont while in house  Get records    Acute kidney injury (HCC)- (present on admission)  Assessment & Plan  Renal function has since normalized    Primary hypertension- (present on admission)  Assessment & Plan  Currently on losarten 100, carvedilol 25, doxazosin 2, amiloride 25      Hypothyroidism- (present on admission)  Assessment & Plan  Liothyronine 25  Levothyroxine 75    Obesity (BMI 35.0-39.9 without comorbidity)- (present on admission)  Assessment & Plan  For future weight management     Plan  Complete antibiotics  Continue Coreg for heart rate and blood pressure control  Continue support to the patient's history of Valentín's  Recheck blood counts, check iron level  Glycemic control to continue  Await disposition to LTAC  See orders  Medically complex high risk patient  High risk medication in use including enoxaparin  VTE prophylaxis: therapeutic anticoagulation with enoxaparin    I have performed a physical exam and reviewed and updated ROS and Plan today (12/21/2021). In review of yesterday's note (12/20/2021), there are no changes except as documented above.      Please note that this dictation was created using voice recognition software. I have made every reasonable attempt to correct obvious errors, but I expect that there are errors of grammar and possibly context that I did not discover before finalizing the note.

## 2021-12-21 NOTE — CARE PLAN
Problem: Hyperinflation  Goal: Prevent or improve atelectasis  Description: 1. Instruct incentive spirometry usage  2.  Perform hyperinflation therapy as indicated  Outcome: Progressing

## 2021-12-21 NOTE — ASSESSMENT & PLAN NOTE
- Body mass index is 39.13 kg/m²..  -  on weight loss.  - outpatient referral for outpatient weight management.

## 2021-12-21 NOTE — DISCHARGE PLANNING
Care Transition Team Discharge Planning    Anticipated Discharge Disposition: d/c to LTAC    Action: Lsw attended rounds. Pt is medically cleared.    Lsw spoke to LTAC/[facility] liaison later. She indicates they have INS AUTH, but no bed open at this time. She hopes to have an open bed later this week, maybe even tomorrow.    Barriers to Discharge: open bed,    Plan: Lsw will continue to follow, and assist w/ d/c planning.

## 2021-12-22 LAB
ALBUMIN SERPL BCP-MCNC: 2.5 G/DL (ref 3.2–4.9)
ALBUMIN/GLOB SERPL: 0.9 G/DL
ALP SERPL-CCNC: 159 U/L (ref 30–99)
ALT SERPL-CCNC: 29 U/L (ref 2–50)
ANION GAP SERPL CALC-SCNC: 12 MMOL/L (ref 7–16)
ANISOCYTOSIS BLD QL SMEAR: ABNORMAL
AST SERPL-CCNC: 18 U/L (ref 12–45)
BASOPHILS # BLD AUTO: 0.3 % (ref 0–1.8)
BASOPHILS # BLD: 0.02 K/UL (ref 0–0.12)
BILIRUB SERPL-MCNC: 0.3 MG/DL (ref 0.1–1.5)
BUN SERPL-MCNC: 7 MG/DL (ref 8–22)
CALCIUM SERPL-MCNC: 7.9 MG/DL (ref 8.5–10.5)
CHLORIDE SERPL-SCNC: 105 MMOL/L (ref 96–112)
CO2 SERPL-SCNC: 23 MMOL/L (ref 20–33)
COMMENT 1642: NORMAL
CREAT SERPL-MCNC: 0.5 MG/DL (ref 0.5–1.4)
EOSINOPHIL # BLD AUTO: 0.07 K/UL (ref 0–0.51)
EOSINOPHIL NFR BLD: 1 % (ref 0–6.9)
ERYTHROCYTE [DISTWIDTH] IN BLOOD BY AUTOMATED COUNT: 77 FL (ref 35.9–50)
FERRITIN SERPL-MCNC: 535 NG/ML (ref 10–291)
GLOBULIN SER CALC-MCNC: 2.7 G/DL (ref 1.9–3.5)
GLUCOSE BLD-MCNC: 131 MG/DL (ref 65–99)
GLUCOSE BLD-MCNC: 133 MG/DL (ref 65–99)
GLUCOSE BLD-MCNC: 299 MG/DL (ref 65–99)
GLUCOSE BLD-MCNC: 72 MG/DL (ref 65–99)
GLUCOSE BLD-MCNC: 76 MG/DL (ref 65–99)
GLUCOSE SERPL-MCNC: 198 MG/DL (ref 65–99)
HCT VFR BLD AUTO: 29.7 % (ref 37–47)
HGB BLD-MCNC: 8.5 G/DL (ref 12–16)
HYPOCHROMIA BLD QL SMEAR: ABNORMAL
IMM GRANULOCYTES # BLD AUTO: 0.1 K/UL (ref 0–0.11)
IMM GRANULOCYTES NFR BLD AUTO: 1.4 % (ref 0–0.9)
IRON SATN MFR SERPL: 22 % (ref 15–55)
IRON SERPL-MCNC: 46 UG/DL (ref 40–170)
LYMPHOCYTES # BLD AUTO: 1 K/UL (ref 1–4.8)
LYMPHOCYTES NFR BLD: 14.5 % (ref 22–41)
MACROCYTES BLD QL SMEAR: ABNORMAL
MAGNESIUM SERPL-MCNC: 1.3 MG/DL (ref 1.5–2.5)
MCH RBC QN AUTO: 26.6 PG (ref 27–33)
MCHC RBC AUTO-ENTMCNC: 28.6 G/DL (ref 33.6–35)
MCV RBC AUTO: 92.8 FL (ref 81.4–97.8)
MICROCYTES BLD QL SMEAR: ABNORMAL
MONOCYTES # BLD AUTO: 0.69 K/UL (ref 0–0.85)
MONOCYTES NFR BLD AUTO: 10 % (ref 0–13.4)
MORPHOLOGY BLD-IMP: NORMAL
NEUTROPHILS # BLD AUTO: 5.04 K/UL (ref 2–7.15)
NEUTROPHILS NFR BLD: 72.8 % (ref 44–72)
NRBC # BLD AUTO: 0.02 K/UL
NRBC BLD-RTO: 0.3 /100 WBC
NT-PROBNP SERPL IA-MCNC: 352 PG/ML (ref 0–125)
OVALOCYTES BLD QL SMEAR: NORMAL
PHOSPHATE SERPL-MCNC: 3.1 MG/DL (ref 2.5–4.5)
PLATELET # BLD AUTO: 329 K/UL (ref 164–446)
PLATELET BLD QL SMEAR: NORMAL
PMV BLD AUTO: 10.7 FL (ref 9–12.9)
POLYCHROMASIA BLD QL SMEAR: NORMAL
POTASSIUM SERPL-SCNC: 3.2 MMOL/L (ref 3.6–5.5)
PROT SERPL-MCNC: 5.2 G/DL (ref 6–8.2)
RBC # BLD AUTO: 3.2 M/UL (ref 4.2–5.4)
RBC BLD AUTO: PRESENT
SODIUM SERPL-SCNC: 140 MMOL/L (ref 135–145)
TIBC SERPL-MCNC: 208 UG/DL (ref 250–450)
UIBC SERPL-MCNC: 162 UG/DL (ref 110–370)
WBC # BLD AUTO: 6.9 K/UL (ref 4.8–10.8)

## 2021-12-22 PROCEDURE — 700101 HCHG RX REV CODE 250: Performed by: INTERNAL MEDICINE

## 2021-12-22 PROCEDURE — 700105 HCHG RX REV CODE 258: Performed by: STUDENT IN AN ORGANIZED HEALTH CARE EDUCATION/TRAINING PROGRAM

## 2021-12-22 PROCEDURE — A9270 NON-COVERED ITEM OR SERVICE: HCPCS | Performed by: HOSPITALIST

## 2021-12-22 PROCEDURE — A9270 NON-COVERED ITEM OR SERVICE: HCPCS | Performed by: INTERNAL MEDICINE

## 2021-12-22 PROCEDURE — 94760 N-INVAS EAR/PLS OXIMETRY 1: CPT

## 2021-12-22 PROCEDURE — 700102 HCHG RX REV CODE 250 W/ 637 OVERRIDE(OP): Performed by: HOSPITALIST

## 2021-12-22 PROCEDURE — 770000 HCHG ROOM/CARE - INTERMEDIATE ICU *

## 2021-12-22 PROCEDURE — 94669 MECHANICAL CHEST WALL OSCILL: CPT

## 2021-12-22 PROCEDURE — 83540 ASSAY OF IRON: CPT

## 2021-12-22 PROCEDURE — 700111 HCHG RX REV CODE 636 W/ 250 OVERRIDE (IP): Performed by: HOSPITALIST

## 2021-12-22 PROCEDURE — 80053 COMPREHEN METABOLIC PANEL: CPT

## 2021-12-22 PROCEDURE — 82962 GLUCOSE BLOOD TEST: CPT

## 2021-12-22 PROCEDURE — 85025 COMPLETE CBC W/AUTO DIFF WBC: CPT

## 2021-12-22 PROCEDURE — 83735 ASSAY OF MAGNESIUM: CPT

## 2021-12-22 PROCEDURE — 84100 ASSAY OF PHOSPHORUS: CPT

## 2021-12-22 PROCEDURE — 99233 SBSQ HOSP IP/OBS HIGH 50: CPT | Performed by: HOSPITALIST

## 2021-12-22 PROCEDURE — 700111 HCHG RX REV CODE 636 W/ 250 OVERRIDE (IP)

## 2021-12-22 PROCEDURE — 83880 ASSAY OF NATRIURETIC PEPTIDE: CPT

## 2021-12-22 PROCEDURE — 94660 CPAP INITIATION&MGMT: CPT

## 2021-12-22 PROCEDURE — 83550 IRON BINDING TEST: CPT

## 2021-12-22 PROCEDURE — 700111 HCHG RX REV CODE 636 W/ 250 OVERRIDE (IP): Performed by: INTERNAL MEDICINE

## 2021-12-22 PROCEDURE — 700101 HCHG RX REV CODE 250: Performed by: HOSPITALIST

## 2021-12-22 PROCEDURE — 700102 HCHG RX REV CODE 250 W/ 637 OVERRIDE(OP): Performed by: INTERNAL MEDICINE

## 2021-12-22 PROCEDURE — 82728 ASSAY OF FERRITIN: CPT

## 2021-12-22 RX ORDER — MIDODRINE HYDROCHLORIDE 5 MG/1
2.5 TABLET ORAL
Status: DISCONTINUED | OUTPATIENT
Start: 2021-12-22 | End: 2021-12-23

## 2021-12-22 RX ORDER — SODIUM CHLORIDE, SODIUM LACTATE, POTASSIUM CHLORIDE, AND CALCIUM CHLORIDE .6; .31; .03; .02 G/100ML; G/100ML; G/100ML; G/100ML
500 INJECTION, SOLUTION INTRAVENOUS ONCE
Status: COMPLETED | OUTPATIENT
Start: 2021-12-22 | End: 2021-12-22

## 2021-12-22 RX ORDER — FLUDROCORTISONE ACETATE 0.1 MG/1
0.2 TABLET ORAL 2 TIMES DAILY
Status: DISCONTINUED | OUTPATIENT
Start: 2021-12-23 | End: 2021-12-29 | Stop reason: HOSPADM

## 2021-12-22 RX ORDER — PHENYLEPHRINE HCL IN 0.9% NACL 0.5 MG/5ML
SYRINGE (ML) INTRAVENOUS
Status: COMPLETED
Start: 2021-12-22 | End: 2021-12-22

## 2021-12-22 RX ORDER — POTASSIUM CHLORIDE 20 MEQ/1
40 TABLET, EXTENDED RELEASE ORAL EVERY 6 HOURS
Status: COMPLETED | OUTPATIENT
Start: 2021-12-22 | End: 2021-12-22

## 2021-12-22 RX ORDER — MAGNESIUM SULFATE HEPTAHYDRATE 40 MG/ML
4 INJECTION, SOLUTION INTRAVENOUS ONCE
Status: COMPLETED | OUTPATIENT
Start: 2021-12-22 | End: 2021-12-22

## 2021-12-22 RX ORDER — FLUDROCORTISONE ACETATE 0.1 MG/1
0.1 TABLET ORAL ONCE
Status: COMPLETED | OUTPATIENT
Start: 2021-12-22 | End: 2021-12-22

## 2021-12-22 RX ORDER — NOREPINEPHRINE BITARTRATE 0.03 MG/ML
0-30 INJECTION, SOLUTION INTRAVENOUS CONTINUOUS
Status: DISCONTINUED | OUTPATIENT
Start: 2021-12-22 | End: 2021-12-23

## 2021-12-22 RX ORDER — PHENYLEPHRINE HCL IN 0.9% NACL 0.5 MG/5ML
100 SYRINGE (ML) INTRAVENOUS
Status: ACTIVE | OUTPATIENT
Start: 2021-12-22 | End: 2021-12-22

## 2021-12-22 RX ADMIN — POTASSIUM CHLORIDE 40 MEQ: 1500 TABLET, EXTENDED RELEASE ORAL at 17:45

## 2021-12-22 RX ADMIN — LEVOTHYROXINE SODIUM 75 MCG: 0.07 TABLET ORAL at 05:15

## 2021-12-22 RX ADMIN — Medication 100 MCG: at 18:14

## 2021-12-22 RX ADMIN — GABAPENTIN 200 MG: 100 CAPSULE ORAL at 23:29

## 2021-12-22 RX ADMIN — DEXAMETHASONE 0.5 MG: 1 TABLET ORAL at 17:46

## 2021-12-22 RX ADMIN — OXYCODONE 5 MG: 5 TABLET ORAL at 02:26

## 2021-12-22 RX ADMIN — INSULIN HUMAN 7 UNITS: 100 INJECTION, SOLUTION PARENTERAL at 11:05

## 2021-12-22 RX ADMIN — NOREPINEPHRINE BITARTRATE 2 MCG/MIN: 1 INJECTION INTRAVENOUS at 18:30

## 2021-12-22 RX ADMIN — CARVEDILOL 25 MG: 25 TABLET, FILM COATED ORAL at 06:46

## 2021-12-22 RX ADMIN — FLUDROCORTISONE ACETATE 0.1 MG: 0.1 TABLET ORAL at 05:15

## 2021-12-22 RX ADMIN — GABAPENTIN 200 MG: 100 CAPSULE ORAL at 05:15

## 2021-12-22 RX ADMIN — HYDROCORTISONE 10 MG: 20 TABLET ORAL at 18:00

## 2021-12-22 RX ADMIN — POTASSIUM CHLORIDE 40 MEQ: 1500 TABLET, EXTENDED RELEASE ORAL at 10:28

## 2021-12-22 RX ADMIN — GABAPENTIN 200 MG: 100 CAPSULE ORAL at 13:00

## 2021-12-22 RX ADMIN — HYDROCORTISONE 20 MG: 20 TABLET ORAL at 17:45

## 2021-12-22 RX ADMIN — FLUDROCORTISONE ACETATE 0.1 MG: 0.1 TABLET ORAL at 17:46

## 2021-12-22 RX ADMIN — LIDOCAINE 1 PATCH: 50 PATCH TOPICAL at 09:33

## 2021-12-22 RX ADMIN — OXYCODONE 5 MG: 5 TABLET ORAL at 23:22

## 2021-12-22 RX ADMIN — OXYCODONE 5 MG: 5 TABLET ORAL at 13:48

## 2021-12-22 RX ADMIN — MAGNESIUM SULFATE HEPTAHYDRATE 4 G: 40 INJECTION, SOLUTION INTRAVENOUS at 10:29

## 2021-12-22 RX ADMIN — SODIUM CHLORIDE, POTASSIUM CHLORIDE, SODIUM LACTATE AND CALCIUM CHLORIDE 500 ML: 600; 310; 30; 20 INJECTION, SOLUTION INTRAVENOUS at 19:47

## 2021-12-22 RX ADMIN — ENOXAPARIN SODIUM 100 MG: 100 INJECTION SUBCUTANEOUS at 17:46

## 2021-12-22 RX ADMIN — DOXAZOSIN 2 MG: 2 TABLET ORAL at 05:15

## 2021-12-22 RX ADMIN — HYDROCORTISONE 20 MG: 20 TABLET ORAL at 05:15

## 2021-12-22 RX ADMIN — CARVEDILOL 25 MG: 25 TABLET, FILM COATED ORAL at 17:46

## 2021-12-22 RX ADMIN — OMEPRAZOLE 40 MG: 20 CAPSULE, DELAYED RELEASE ORAL at 05:14

## 2021-12-22 RX ADMIN — DEXAMETHASONE 0.5 MG: 1 TABLET ORAL at 05:14

## 2021-12-22 RX ADMIN — OXYCODONE 5 MG: 5 TABLET ORAL at 09:33

## 2021-12-22 RX ADMIN — MIDODRINE HYDROCHLORIDE 2.5 MG: 5 TABLET ORAL at 18:45

## 2021-12-22 RX ADMIN — ENOXAPARIN SODIUM 100 MG: 100 INJECTION SUBCUTANEOUS at 05:14

## 2021-12-22 RX ADMIN — OXYCODONE 5 MG: 5 TABLET ORAL at 06:46

## 2021-12-22 RX ADMIN — FLUDROCORTISONE ACETATE 0.1 MG: 0.1 TABLET ORAL at 18:30

## 2021-12-22 RX ADMIN — LIOTHYRONINE SODIUM 25 MCG: 25 TABLET ORAL at 05:15

## 2021-12-22 RX ADMIN — INSULIN HUMAN 5 UNITS: 100 INJECTION, SUSPENSION SUBCUTANEOUS at 17:47

## 2021-12-22 RX ADMIN — FUROSEMIDE 20 MG: 20 TABLET ORAL at 05:14

## 2021-12-22 RX ADMIN — LOSARTAN POTASSIUM 100 MG: 50 TABLET, FILM COATED ORAL at 13:00

## 2021-12-22 ASSESSMENT — ENCOUNTER SYMPTOMS
PALPITATIONS: 0
BACK PAIN: 0
DIARRHEA: 0
WEAKNESS: 1
DOUBLE VISION: 0
MYALGIAS: 1
LOSS OF CONSCIOUSNESS: 0
NAUSEA: 0
SHORTNESS OF BREATH: 1
VOMITING: 0
ABDOMINAL PAIN: 1
DIZZINESS: 0
BLURRED VISION: 0
CHILLS: 0
FEVER: 0
COUGH: 1
HEADACHES: 0

## 2021-12-22 ASSESSMENT — PAIN DESCRIPTION - PAIN TYPE
TYPE: ACUTE PAIN
TYPE: CHRONIC PAIN
TYPE: ACUTE PAIN
TYPE: CHRONIC PAIN

## 2021-12-22 ASSESSMENT — PATIENT HEALTH QUESTIONNAIRE - PHQ9
SUM OF ALL RESPONSES TO PHQ9 QUESTIONS 1 AND 2: 0
SUM OF ALL RESPONSES TO PHQ9 QUESTIONS 1 AND 2: 0
2. FEELING DOWN, DEPRESSED, IRRITABLE, OR HOPELESS: NOT AT ALL
1. LITTLE INTEREST OR PLEASURE IN DOING THINGS: NOT AT ALL
1. LITTLE INTEREST OR PLEASURE IN DOING THINGS: NOT AT ALL

## 2021-12-22 NOTE — PROGRESS NOTES
2 RN skin check completed with Eki, RN    Areas of concern/Skin observations:  · Scabbing to right nare  · Bruising to left neck and upper chest with slight swelling, no airway issues  · Small scattered skin tears to bilateral forearms, larger ones to right forearm covered with mepitel  · Small skin tear to left inner thigh, larger healing skin tear to right inner thigh   · Buttocks and sacrum are red and blanching-barrier paste applied    Devices in use, assessed under and interventions (as appropriate) for skin protection:  · SCDs, BP cuff, SaO2 monitor, nasal cannula    Interventions in place such as:   · q2 hour turns  · Keeping skin clean and dry  · Use of products such as barrier wipes/cream  · Waffle cushion while OOB: yes  · Bed Type: pressure redistribution mattress  · Mobility: up to chair/EOB

## 2021-12-22 NOTE — CARE PLAN
The patient is Stable - Low risk of patient condition declining or worsening    Shift Goals  Clinical Goals: mobilize, pain management, monitor H&H  Patient Goals: rest comfortably  Family Goals: no family present    Progress made toward(s) clinical / shift goals:  yes      Problem: Pain - Standard  Goal: Alleviation of pain or a reduction in pain to the patient’s comfort goal  Outcome: Progressing     Problem: Skin Integrity  Goal: Skin integrity is maintained or improved  Outcome: Progressing

## 2021-12-22 NOTE — PROGRESS NOTES
[facility] Medicine Daily Progress Note    Date of Service  12/22/2021    Chief Complaint  Donna Isaac is a 56 y.o. female admitted 12/1/2021 with abd/chest pain    Hospital Course  Ms Isaac has a complicated past medical history that includes traumatic brain injury, congestive heart failure and Valentín's disease.  She presented the emergency room on 12/1/2021 with complaints of abdominal pain.  Patient was found to have a pulmonary embolism and acute pancreatitis.  Patient started on heparin drip and admitted to the hospital she subsequently developed shock,  she was started on pressors and orders were changed to admit to the ICU.  While waiting for transfer the patient had cardiac arrest in the emergency room, CPR was per formed, she was intubated, she was resuscitated with massive transfusion protocol.  Patient has been supported with ventilator, and IVC filter has been placed 12/6 , her renal function has improved and she was extubated on 12/13/2021.  The patient diagnosed with left upper venous thrombosis including subclavian vein, left upper extremity venous thrombosis axillary vein, as well as acute deep vein thrombosis in the left posterior tibial vein.    Interval Problem Update  Patient seen and examined today.    Patient tolerating treatment and therapies.  All Data, Medication data reviewed.  Case discussed with nursing as available.  Plan of Care reviewed with patient and notified of changes.  12/21 the patient is afebrile, she is found with a pulse rate around 80, respirations unlabored in the 20s, 3 L nasal cannula oxygen, laboratory data indicated slightly low hemoglobin at 7.9, no evidence of acute bleeding, chemistry overall benign, slightly elevated alk phos, low albumin at 2.3, patient is complaining of left foot and ankle pain, x-ray without bony abnormality, significant soft tissue swelling.  12/22 the patient feels overall better, she does have some pains and aches but overall  controlled, she is looking forward to her therapy, apparently [facility] has no availability today, laboratory data is reviewed, H&H is currently stable, chemistry reveals a low potassium and low magnesium, apparently patient had some diarrhea, her iron stores are adequate, electrolytes are being replaced, the patient updated on plan and to wrap her left lower extremity secondary to some significant edema from her DVT, anticoagulation is currently tolerated, no evidence of bleeding.  The patient episode of low blood sugar, instructed the patient to ask for a nighttime snack for sure    I have personally seen and examined the patient at bedside. I discussed the plan of care with patient, bedside RN and pharmacy.    Consultants/Specialty  general surgery and nephrology    Code Status  DNAR/DNI    Disposition  Patient is not medically cleared.   Anticipate discharge to to an inpatient rehabilitation hospital./LTAC  I have placed the appropriate orders for post-discharge needs.    Review of Systems  Review of Systems   Constitutional: Negative for chills and fever.   Eyes: Negative for blurred vision and double vision.   Respiratory: Positive for cough and shortness of breath.    Cardiovascular: Positive for chest pain and leg swelling. Negative for palpitations.   Gastrointestinal: Positive for abdominal pain. Negative for diarrhea, nausea and vomiting.   Genitourinary: Negative for dysuria and urgency.   Musculoskeletal: Positive for joint pain and myalgias. Negative for back pain.   Skin: Negative for rash.   Neurological: Positive for weakness. Negative for dizziness, loss of consciousness and headaches.        Physical Exam  Temp:  [36.3 °C (97.3 °F)-37.3 °C (99.1 °F)] 36.6 °C (97.9 °F)  Pulse:  [] 105  Resp:  [14-33] 21  BP: ()/(46-93) 124/85  SpO2:  [85 %-98 %] 94 %    Physical Exam  Vitals reviewed.   Constitutional:       General: She is not in acute distress.     Appearance: She is well-developed. She is  obese. She is not diaphoretic.   HENT:      Head: Normocephalic and atraumatic.   Neck:      Vascular: No JVD.   Cardiovascular:      Rate and Rhythm: Normal rate and regular rhythm.      Comments: Chest pain with pressure  Pulmonary:      Effort: Pulmonary effort is normal. No respiratory distress.      Breath sounds: No stridor. No wheezing or rales.   Abdominal:      Palpations: Abdomen is soft.      Tenderness: There is no abdominal tenderness. There is no guarding or rebound.   Musculoskeletal:      Right lower leg: Edema present.      Left lower leg: Edema present.      Comments: 3+ pitting edema to mid thigh   Skin:     General: Skin is warm and dry.      Findings: No rash.   Neurological:      General: No focal deficit present.      Mental Status: She is alert and oriented to person, place, and time. Mental status is at baseline.   Psychiatric:         Mood and Affect: Mood normal.         Thought Content: Thought content normal.         Fluids    Intake/Output Summary (Last 24 hours) at 12/22/2021 0733  Last data filed at 12/22/2021 0600  Gross per 24 hour   Intake 2870 ml   Output 4150 ml   Net -1280 ml       Laboratory  Recent Labs     12/20/21  0329 12/21/21  0445 12/22/21  0544   WBC 10.7 6.1 6.9   RBC 3.29* 2.90* 3.20*   HEMOGLOBIN 8.9* 7.9* 8.5*   HEMATOCRIT 30.3* 27.3* 29.7*   MCV 92.1 94.1 92.8   MCH 27.1 27.2 26.6*   MCHC 29.4* 28.9* 28.6*   RDW 72.5* 78.3* 77.0*   PLATELETCT 337 298 329   MPV 10.5 10.6 10.7     Recent Labs     12/20/21  0329 12/21/21  0445 12/22/21  0544   SODIUM 142 145 140   POTASSIUM 3.4* 3.7 3.2*   CHLORIDE 107 111 105   CO2 25 26 23   GLUCOSE 109* 86 198*   BUN 8 8 7*   CREATININE 0.42* 0.41* 0.50   CALCIUM 8.5 7.9* 7.9*                   Imaging  DX-FOOT-2- LEFT   Final Result      Soft tissue swelling without acute osseous abnormality.      DX-CHEST-PORTABLE (1 VIEW)   Final Result         1.  Mild edema and/or infiltrate, particularly on the right, stable since prior study    2.  Cardiomegaly      DX-CHEST-PORTABLE (1 VIEW)   Final Result         1.  Mild edema and/or infiltrate, particularly on the right   2.  Cardiomegaly      DX-CHEST-PORTABLE (1 VIEW)   Final Result         1.  Bibasilar atelectasis or early infiltrate   2.  Cardiomegaly      DX-CHEST-PORTABLE (1 VIEW)   Final Result      1.  Bibasilar underinflation atelectasis which could obscure an additional process. This is unchanged.   2.  Suspect small LEFT pleural effusion      DX-CHEST-PORTABLE (1 VIEW)   Final Result         1.  Bilateral basilar atelectasis, no focal infiltrate   2.  Cardiomegaly      DX-CHEST-PORTABLE (1 VIEW)   Final Result         1.  Bilateral basilar atelectasis, no focal infiltrate   2.  Cardiomegaly      DX-CHEST-PORTABLE (1 VIEW)   Final Result         1.  Left lower lobe atelectasis or infiltrates, similar compared to prior study.   2.  Cardiomegaly      DX-CHEST-PORTABLE (1 VIEW)   Final Result         1.  Left lower lobe atelectasis or infiltrates, similar compared to prior study.   2.  Cardiomegaly      DX-CHEST-PORTABLE (1 VIEW)   Final Result         1.  Left lower lobe atelectasis or infiltrates, similar compared to prior study.   2.  Cardiomegaly      IR-INSERT IVC FILTER WITH IG & SI   Final Result      Ultrasound and fluoroscopic guided placement of an Option Elite IVC filter.      DX-CHEST-PORTABLE (1 VIEW)   Final Result         1.  Pulmonary edema and/or infiltrates are identified, which are stable since the prior exam.   2.  Cardiomegaly      DX-CHEST-PORTABLE (1 VIEW)   Final Result      Tubes and lines as described above.      Bibasilar atelectasis with small pleural effusions.      DX-CHEST-PORTABLE (1 VIEW)   Final Result         1. No significant interval change.      TA-EXXPWEV-0 VIEW   Final Result      Cortrak feeding tube tip projects in the region of the mid stomach.      DX-CHEST-PORTABLE (1 VIEW)   Final Result      Placement of right IJ dialysis catheter.      Otherwise  stable findings.      DX-CHEST-PORTABLE (1 VIEW)   Final Result         1.  Pulmonary edema and/or infiltrates are identified, which are stable since the prior exam.   2.  Cardiomegaly      DX-CHEST-PORTABLE (1 VIEW)   Final Result         1.  Pulmonary edema and/or infiltrates are identified, which are stable since the prior exam.   2.  Cardiomegaly      DX-CHEST-FOR LINE PLACEMENT Perform procedure in: Patient's Room   Final Result      Interval placement of a left IJ central venous catheter with the tip overlying the right atrium.      US-EXTREMITY VENOUS LOWER BILAT   Final Result      CTA ABDOMEN PELVIS W & W/O POST PROCESS   Final Result      1.  Small to moderate amount of hemoperitoneum in the abdomen and pelvis. No active extravasation is identified.   2.  No aortic aneurysm or dissection.   3.  Peripancreatic stranding can be related to pancreatitis.   4.  Cardiomegaly.   5.  Hepatic steatosis.   6.  Status post cholecystectomy.   7.  Atrophic kidneys bilaterally.   8.  Status post gastric bypass surgery.   9.  Mild wall thickening of the second portion of the duodenum with surrounding inflammation. This can be seen in duodenitis/peptic ulcer disease.   10.  Colonic diverticulosis.   11.  Bibasilar atelectasis/consolidation. Groundglass opacities with septal thickening at the lung bases can be seen in the setting of edema or multifocal pneumonia.   12.  Bilateral anterior rib fractures.      Findings discussed with Dr. Rush.      DX-CHEST-PORTABLE (1 VIEW)   Final Result      1.  Endotracheal tube present.      2.  Cardiomegaly with interstitial prominence.      EC-ECHOCARDIOGRAM COMPLETE W/ CONT   Final Result      CT-CTA CHEST PULMONARY ARTERY W/ RECONS   Final Result      1.  Positive for pulmonary embolism located in multiple segmental and subsegmental branches      2.  Minimal elevation of RV/LV ratio      3.  Mild atelectasis      4.  Left PICC line present and appears appropriately located      5.   Findings were discussed with BLAINE VEGA on 12/1/2021 5:35 PM.                  CT-ABDOMEN-PELVIS WITH   Final Result      1.  Apparent inflammation of the pancreas with surrounding fat stranding and fluid suggesting pancreatitis. Recommend correlation with lipase.      2.  Diverticulosis without diverticulitis.      3.  Hepatic steatosis.      US-EXTREMITY VENOUS UPPER UNILAT LEFT   Final Result      DX-CHEST-PORTABLE (1 VIEW)   Final Result      1.  No acute cardiopulmonary abnormality identified.      2.  Left PICC line appears appropriately located           Assessment/Plan  * Cardiac arrest (HCC)- (present on admission)  Assessment & Plan  Secondary to hemorrhagic shock    Acute respiratory failure with hypoxia (HCC)- (present on admission)  Assessment & Plan  Intubated due to hemorrhagic shock  Extubated 12/13  Still quite frail  DNR/I at present  IS  Mobilize  O2/RT protocols  Follow volume status carefully    Pulmonary hypertension (HCC)- (present on admission)  Assessment & Plan  ROSSI, Hx PE  Cont O2 support, diuresis    Atelectasis  Assessment & Plan  Incentive spirometry, positional changes, alveolar recruiting    Hypokalemia- (present on admission)  Assessment & Plan  Follow and replace  Also addressing Mg    Paroxysmal atrial fibrillation (HCC)- (present on admission)  Assessment & Plan  Currently in sinus  Rate/rythm control with Carvedilol  anticoagulation currently with enoxaparin with plan to transition to DOAC on DC    Hypocalcemia- (present on admission)  Assessment & Plan  S/p replacement    Hypomagnesemia- (present on admission)  Assessment & Plan  Replace and recheck    Hemoperitoneum- (present on admission)  Assessment & Plan  In setting of acute pancreatitis in a pt on full dose anticoagulation  Stable  Follow daily H&H    DVT (deep venous thrombosis) ([facility])  Assessment & Plan  Upper extremity as well as lower extremity  IVC in place  Enoxaparin at full dose, close monitor  Eventual  DOAC    Shock (HCC)- (present on admission)  Assessment & Plan  Hemorrhagic from intra-abd bleed  Now resolved  Cont to follow H&H  Has been re-challenged with Enoxaparin 100mg/kg and is tolerating thus far    Acute pancreatitis- (present on admission)  Assessment & Plan  Idiopathic, IV fluids, symptomatic management    PE (pulmonary thromboembolism) (HCC)- (present on admission)  Assessment & Plan  Tolerating enoxaparin 100mg/kg  Has IVC in place   Transition to po DOAC as DC plan becomes more clear and if no further issues with bleeding    Debilitated patient  Assessment & Plan  PT/OT  Needs Rehab however at least at this point would not tolerate 3 hrs of daily therapy  LTAC referral    Acute blood loss anemia- (present on admission)  Assessment & Plan  Secondary to retroperitoneal bleed, monitor check iron levels    Thrombocytopenia (HCC)- (present on admission)  Assessment & Plan  Resolved  Cont to follow    Adrenal insufficiency (Erath's disease) (HCC)- (present on admission)  Assessment & Plan  On home regimen per her endocrinologist: hydrocortisone, dexamthasone, florinef    Elevated LFTs- (present on admission)  Assessment & Plan  Have stabilized    MRSA and E. coli sinusitis- (present on admission)  Assessment & Plan  Patient reports daily IV infusion of antibiotics for MRSA sinusitis  Cont while in house  Get records    Acute kidney injury (HCC)- (present on admission)  Assessment & Plan  Renal function has since normalized    Primary hypertension- (present on admission)  Assessment & Plan  Currently on losarten 100, carvedilol 25, doxazosin 2, amiloride 25      Hypothyroidism- (present on admission)  Assessment & Plan  Liothyronine 25  Levothyroxine 75    Obesity (BMI 35.0-39.9 without comorbidity)- (present on admission)  Assessment & Plan  For future weight management     Plan  Complete antibiotics  Continue Coreg for heart rate and blood pressure control  Continue support to the patient's history of  Valentín's  Recheck blood counts, check iron level, seems sufficient, monitor  Glycemic control to continue, avoid hypoglycemia  Await disposition to LTAC  See orders  Medically complex high risk patient  High risk medication in use including enoxaparin, would give it some more time to switch over to Eliquis if possible  VTE prophylaxis: therapeutic anticoagulation with enoxaparin    I have performed a physical exam and reviewed and updated ROS and Plan today (12/22/2021). In review of yesterday's note (12/21/2021), there are no changes except as documented above.      Please note that this dictation was created using voice recognition software. I have made every reasonable attempt to correct obvious errors, but I expect that there are errors of grammar and possibly context that I did not discover before finalizing the note.

## 2021-12-22 NOTE — DIETARY
Nutrition Services: Update    Admit day 21.  Pt on PO diet per SLP since 12/14; diet further upgraded to regular texture and consistency on 12/17.  Boost supplement added 1x/day on 12/20.   Pt ordering preferred meals from Nutrition [employer].  PO intake variable per ADLs but sometimes %.   Current weight of 98.2 kg is consistent with admit weight of 98.7 kg, although -25 L fluid per I/O. Weight has been between 97 kg and 111.1 kg from 12/2 to 12/19.   Based on pt's meal choices and estimated needs, will increase Boost supplements to TID.     RD continues to follow.

## 2021-12-22 NOTE — CARE PLAN
Problem: Hyperinflation  Goal: Prevent or improve atelectasis  Description: 1. Instruct incentive spirometry usage  2.  Perform hyperinflation therapy as indicated  Outcome: Progressing  Flowsheets (Taken 12/21/2021 1752)  Hyperinflation Protocol Indications: Atelectasis Documented by Chest X-Ray

## 2021-12-22 NOTE — DISCHARGE PLANNING
Anticipated Discharge Disposition: [facility]    Action: Patient discussed in rounds. Medically clear for [facility]. LSW spoke to mina powell/ [facility] who reports no bed today, bed may be available tomorrow. LSW informed team.    Barriers to Discharge: bed availability    Plan: F/U on beds tomorrow

## 2021-12-22 NOTE — CARE PLAN
Problem: Nutritional:  Goal: Achieve adequate nutritional intake  Description: Patient will consume >50% of meals and supplements.   Outcome: Progressing

## 2021-12-23 ENCOUNTER — APPOINTMENT (OUTPATIENT)
Dept: RADIOLOGY | Facility: MEDICAL CENTER | Age: 56
DRG: 981 | End: 2021-12-23
Attending: STUDENT IN AN ORGANIZED HEALTH CARE EDUCATION/TRAINING PROGRAM
Payer: MEDICARE

## 2021-12-23 ENCOUNTER — APPOINTMENT (OUTPATIENT)
Dept: RADIOLOGY | Facility: MEDICAL CENTER | Age: 56
DRG: 981 | End: 2021-12-23
Attending: HOSPITALIST
Payer: MEDICARE

## 2021-12-23 PROBLEM — R57.8 HEMORRHAGIC SHOCK (HCC): Status: ACTIVE | Noted: 2021-12-23

## 2021-12-23 PROBLEM — R10.12 LEFT UPPER QUADRANT ABDOMINAL PAIN: Status: ACTIVE | Noted: 2021-12-23

## 2021-12-23 PROBLEM — R53.81 DEBILITATED PATIENT: Status: RESOLVED | Noted: 2021-08-20 | Resolved: 2021-12-23

## 2021-12-23 PROBLEM — E87.6 HYPOKALEMIA: Status: RESOLVED | Noted: 2021-12-07 | Resolved: 2021-12-23

## 2021-12-23 PROBLEM — D69.6 THROMBOCYTOPENIA (HCC): Status: RESOLVED | Noted: 2021-07-07 | Resolved: 2021-12-23

## 2021-12-23 PROBLEM — K92.2 GASTROINTESTINAL HEMORRHAGE: Status: RESOLVED | Noted: 2021-12-02 | Resolved: 2021-12-23

## 2021-12-23 PROBLEM — E83.51 HYPOCALCEMIA: Status: RESOLVED | Noted: 2021-12-03 | Resolved: 2021-12-23

## 2021-12-23 PROBLEM — R79.89 ELEVATED LFTS: Status: RESOLVED | Noted: 2021-04-27 | Resolved: 2021-12-23

## 2021-12-23 PROBLEM — J98.11 ATELECTASIS: Status: RESOLVED | Noted: 2021-12-08 | Resolved: 2021-12-23

## 2021-12-23 PROBLEM — E83.119 HEMOCHROMATOSIS: Status: RESOLVED | Noted: 2021-06-29 | Resolved: 2021-12-23

## 2021-12-23 PROBLEM — I10 PRIMARY HYPERTENSION: Status: RESOLVED | Noted: 2020-03-16 | Resolved: 2021-12-23

## 2021-12-23 PROBLEM — E66.9 OBESITY (BMI 35.0-39.9 WITHOUT COMORBIDITY): Status: RESOLVED | Noted: 2018-12-03 | Resolved: 2021-12-23

## 2021-12-23 PROBLEM — E83.42 HYPOMAGNESEMIA: Status: RESOLVED | Noted: 2021-12-03 | Resolved: 2021-12-23

## 2021-12-23 LAB
ABO GROUP BLD: ABNORMAL
ALBUMIN SERPL BCP-MCNC: 2.4 G/DL (ref 3.2–4.9)
ALBUMIN/GLOB SERPL: 1 G/DL
ALP SERPL-CCNC: 146 U/L (ref 30–99)
ALT SERPL-CCNC: 24 U/L (ref 2–50)
AMMONIA PLAS-SCNC: 18 UMOL/L (ref 11–45)
ANION GAP SERPL CALC-SCNC: 10 MMOL/L (ref 7–16)
ANION GAP SERPL CALC-SCNC: 8 MMOL/L (ref 7–16)
ANISOCYTOSIS BLD QL SMEAR: ABNORMAL
APTT PPP: 40.3 SEC (ref 24.7–36)
AST SERPL-CCNC: 16 U/L (ref 12–45)
BASE EXCESS BLDA CALC-SCNC: 1 MMOL/L (ref -4–3)
BASE EXCESS BLDA CALC-SCNC: 1 MMOL/L (ref -4–3)
BASE EXCESS BLDA CALC-SCNC: 2 MMOL/L (ref -4–3)
BASOPHILS # BLD AUTO: 0 % (ref 0–1.8)
BASOPHILS # BLD AUTO: 0.1 % (ref 0–1.8)
BASOPHILS # BLD: 0 K/UL (ref 0–0.12)
BASOPHILS # BLD: 0.01 K/UL (ref 0–0.12)
BILIRUB SERPL-MCNC: 0.3 MG/DL (ref 0.1–1.5)
BLD GP AB SCN SERPL QL: ABNORMAL
BODY TEMPERATURE: ABNORMAL DEGREES
BODY TEMPERATURE: ABNORMAL DEGREES
BODY TEMPERATURE: NORMAL DEGREES
BUN SERPL-MCNC: 7 MG/DL (ref 8–22)
BUN SERPL-MCNC: 8 MG/DL (ref 8–22)
CA-I BLD ISE-SCNC: 1.21 MMOL/L (ref 1.1–1.3)
CA-I BLD ISE-SCNC: 1.21 MMOL/L (ref 1.1–1.3)
CALCIUM SERPL-MCNC: 7.6 MG/DL (ref 8.5–10.5)
CALCIUM SERPL-MCNC: 7.8 MG/DL (ref 8.5–10.5)
CFT BLD TEG: 8.4 MIN (ref 4.6–9.1)
CFT P HPASE BLD TEG: 8.7 MIN (ref 4.3–8.3)
CHLORIDE SERPL-SCNC: 107 MMOL/L (ref 96–112)
CHLORIDE SERPL-SCNC: 108 MMOL/L (ref 96–112)
CLOT ANGLE BLD TEG: 74.4 DEGREES (ref 63–78)
CLOT LYSIS 30M P MA LENFR BLD TEG: 0 % (ref 0–2.6)
CO2 BLDA-SCNC: 25 MMOL/L (ref 20–33)
CO2 BLDA-SCNC: 25 MMOL/L (ref 20–33)
CO2 BLDA-SCNC: 27 MMOL/L (ref 20–33)
CO2 SERPL-SCNC: 23 MMOL/L (ref 20–33)
CO2 SERPL-SCNC: 25 MMOL/L (ref 20–33)
CREAT SERPL-MCNC: 0.58 MG/DL (ref 0.5–1.4)
CREAT SERPL-MCNC: 0.61 MG/DL (ref 0.5–1.4)
CT.EXTRINSIC BLD ROTEM: 1.4 MIN (ref 0.8–2.1)
DELSYS IDSYS: ABNORMAL
DELSYS IDSYS: NORMAL
EOSINOPHIL # BLD AUTO: 0.05 K/UL (ref 0–0.51)
EOSINOPHIL # BLD AUTO: 0.2 K/UL (ref 0–0.51)
EOSINOPHIL NFR BLD: 0.5 % (ref 0–6.9)
EOSINOPHIL NFR BLD: 1.7 % (ref 0–6.9)
ERYTHROCYTE [DISTWIDTH] IN BLOOD BY AUTOMATED COUNT: 68.4 FL (ref 35.9–50)
ERYTHROCYTE [DISTWIDTH] IN BLOOD BY AUTOMATED COUNT: 75.6 FL (ref 35.9–50)
ERYTHROCYTE [DISTWIDTH] IN BLOOD BY AUTOMATED COUNT: 77.6 FL (ref 35.9–50)
FIBRINOGEN PPP-MCNC: 434 MG/DL (ref 215–460)
GLOBULIN SER CALC-MCNC: 2.4 G/DL (ref 1.9–3.5)
GLUCOSE BLD-MCNC: 113 MG/DL (ref 65–99)
GLUCOSE BLD-MCNC: 175 MG/DL (ref 65–99)
GLUCOSE SERPL-MCNC: 130 MG/DL (ref 65–99)
GLUCOSE SERPL-MCNC: 132 MG/DL (ref 65–99)
HCO3 BLDA-SCNC: 23.9 MMOL/L (ref 17–25)
HCO3 BLDA-SCNC: 24.4 MMOL/L (ref 17–25)
HCO3 BLDA-SCNC: 25.5 MMOL/L (ref 17–25)
HCT VFR BLD AUTO: 27.3 % (ref 37–47)
HCT VFR BLD AUTO: 28 % (ref 37–47)
HCT VFR BLD AUTO: 29.7 % (ref 37–47)
HCT VFR BLD AUTO: 30.5 % (ref 37–47)
HCT VFR BLD CALC: 30 % (ref 37–47)
HGB BLD-MCNC: 10.2 G/DL (ref 12–16)
HGB BLD-MCNC: 7.9 G/DL (ref 12–16)
HGB BLD-MCNC: 8.3 G/DL (ref 12–16)
HGB BLD-MCNC: 9.1 G/DL (ref 12–16)
HGB BLD-MCNC: 9.6 G/DL (ref 12–16)
IMM GRANULOCYTES # BLD AUTO: 0.08 K/UL (ref 0–0.11)
IMM GRANULOCYTES NFR BLD AUTO: 0.8 % (ref 0–0.9)
INR PPP: 1.32 (ref 0.87–1.13)
LACTATE BLD-SCNC: 0.7 MMOL/L (ref 0.5–2)
LACTATE BLD-SCNC: 1.4 MMOL/L (ref 0.5–2)
LACTATE BLD-SCNC: 1.8 MMOL/L (ref 0.5–2)
LACTATE BLD-SCNC: 1.9 MMOL/L (ref 0.5–2)
LPM ILPM: 4 LPM
LYMPHOCYTES # BLD AUTO: 0.51 K/UL (ref 1–4.8)
LYMPHOCYTES # BLD AUTO: 0.83 K/UL (ref 1–4.8)
LYMPHOCYTES NFR BLD: 4.3 % (ref 22–41)
LYMPHOCYTES NFR BLD: 8.5 % (ref 22–41)
MAGNESIUM SERPL-MCNC: 1.7 MG/DL (ref 1.5–2.5)
MANUAL DIFF BLD: NORMAL
MCF BLD TEG: 70 MM (ref 52–69)
MCF.PLATELET INHIB BLD ROTEM: 45.1 MM (ref 15–32)
MCH RBC QN AUTO: 27.2 PG (ref 27–33)
MCH RBC QN AUTO: 27.4 PG (ref 27–33)
MCH RBC QN AUTO: 28.5 PG (ref 27–33)
MCHC RBC AUTO-ENTMCNC: 28.9 G/DL (ref 33.6–35)
MCHC RBC AUTO-ENTMCNC: 29.6 G/DL (ref 33.6–35)
MCHC RBC AUTO-ENTMCNC: 31.5 G/DL (ref 33.6–35)
MCV RBC AUTO: 90.5 FL (ref 81.4–97.8)
MCV RBC AUTO: 92.4 FL (ref 81.4–97.8)
MCV RBC AUTO: 94.1 FL (ref 81.4–97.8)
MICROCYTES BLD QL SMEAR: ABNORMAL
MONOCYTES # BLD AUTO: 0.2 K/UL (ref 0–0.85)
MONOCYTES # BLD AUTO: 0.57 K/UL (ref 0–0.85)
MONOCYTES NFR BLD AUTO: 1.7 % (ref 0–13.4)
MONOCYTES NFR BLD AUTO: 5.8 % (ref 0–13.4)
MORPHOLOGY BLD-IMP: NORMAL
MYELOCYTES NFR BLD MANUAL: 0.9 %
NEUTROPHILS # BLD AUTO: 10.88 K/UL (ref 2–7.15)
NEUTROPHILS # BLD AUTO: 8.23 K/UL (ref 2–7.15)
NEUTROPHILS NFR BLD: 84.3 % (ref 44–72)
NEUTROPHILS NFR BLD: 91.4 % (ref 44–72)
NRBC # BLD AUTO: 0.02 K/UL
NRBC # BLD AUTO: 0.02 K/UL
NRBC BLD-RTO: 0.2 /100 WBC
NRBC BLD-RTO: 0.2 /100 WBC
OVALOCYTES BLD QL SMEAR: NORMAL
PA AA BLD-ACNC: ABNORMAL % (ref 0–11)
PA ADP BLD-ACNC: ABNORMAL % (ref 0–17)
PCO2 BLDA: 33.2 MMHG (ref 26–37)
PCO2 BLDA: 34.2 MMHG (ref 26–37)
PCO2 BLDA: 34.3 MMHG (ref 26–37)
PCO2 TEMP ADJ BLDA: 33.8 MMHG (ref 26–37)
PCO2 TEMP ADJ BLDA: 34.7 MMHG (ref 26–37)
PCO2 TEMP ADJ BLDA: 35.7 MMHG (ref 26–37)
PH BLDA: 7.46 [PH] (ref 7.4–7.5)
PH BLDA: 7.46 [PH] (ref 7.4–7.5)
PH BLDA: 7.48 [PH] (ref 7.4–7.5)
PH TEMP ADJ BLDA: 7.45 [PH] (ref 7.4–7.5)
PH TEMP ADJ BLDA: 7.45 [PH] (ref 7.4–7.5)
PH TEMP ADJ BLDA: 7.48 [PH] (ref 7.4–7.5)
PHOSPHATE SERPL-MCNC: 3.1 MG/DL (ref 2.5–4.5)
PLATELET # BLD AUTO: 319 K/UL (ref 164–446)
PLATELET # BLD AUTO: 358 K/UL (ref 164–446)
PLATELET # BLD AUTO: 375 K/UL (ref 164–446)
PLATELET BLD QL SMEAR: NORMAL
PMV BLD AUTO: 10.2 FL (ref 9–12.9)
PMV BLD AUTO: 10.4 FL (ref 9–12.9)
PMV BLD AUTO: 11.7 FL (ref 9–12.9)
PO2 BLDA: 62 MMHG (ref 64–87)
PO2 BLDA: 64 MMHG (ref 64–87)
PO2 BLDA: 98 MMHG (ref 64–87)
PO2 TEMP ADJ BLDA: 67 MMHG (ref 64–87)
PO2 TEMP ADJ BLDA: 68 MMHG (ref 64–87)
PO2 TEMP ADJ BLDA: 96 MMHG (ref 64–87)
POIKILOCYTOSIS BLD QL SMEAR: NORMAL
POLYCHROMASIA BLD QL SMEAR: NORMAL
POTASSIUM BLD-SCNC: 3.6 MMOL/L (ref 3.6–5.5)
POTASSIUM BLD-SCNC: 3.9 MMOL/L (ref 3.6–5.5)
POTASSIUM SERPL-SCNC: 3.8 MMOL/L (ref 3.6–5.5)
POTASSIUM SERPL-SCNC: 3.8 MMOL/L (ref 3.6–5.5)
PROCALCITONIN SERPL-MCNC: 0.84 NG/ML
PROT SERPL-MCNC: 4.8 G/DL (ref 6–8.2)
PROTHROMBIN TIME: 16 SEC (ref 12–14.6)
RBC # BLD AUTO: 2.9 M/UL (ref 4.2–5.4)
RBC # BLD AUTO: 3.03 M/UL (ref 4.2–5.4)
RBC # BLD AUTO: 3.37 M/UL (ref 4.2–5.4)
RBC BLD AUTO: PRESENT
RH BLD: ABNORMAL
SAO2 % BLDA: 93 % (ref 93–99)
SAO2 % BLDA: 93 % (ref 93–99)
SAO2 % BLDA: 98 % (ref 93–99)
SODIUM BLD-SCNC: 135 MMOL/L (ref 135–145)
SODIUM BLD-SCNC: 138 MMOL/L (ref 135–145)
SODIUM SERPL-SCNC: 140 MMOL/L (ref 135–145)
SODIUM SERPL-SCNC: 141 MMOL/L (ref 135–145)
SPECIMEN DRAWN FROM PATIENT: ABNORMAL
SPECIMEN DRAWN FROM PATIENT: ABNORMAL
SPECIMEN DRAWN FROM PATIENT: NORMAL
TEG ALGORITHM TGALG: ABNORMAL
WBC # BLD AUTO: 11.9 K/UL (ref 4.8–10.8)
WBC # BLD AUTO: 14.3 K/UL (ref 4.8–10.8)
WBC # BLD AUTO: 9.8 K/UL (ref 4.8–10.8)

## 2021-12-23 PROCEDURE — 80053 COMPREHEN METABOLIC PANEL: CPT

## 2021-12-23 PROCEDURE — 85025 COMPLETE CBC W/AUTO DIFF WBC: CPT

## 2021-12-23 PROCEDURE — B4141ZZ FLUOROSCOPY OF SUPERIOR MESENTERIC ARTERY USING LOW OSMOLAR CONTRAST: ICD-10-PCS | Performed by: RADIOLOGY

## 2021-12-23 PROCEDURE — 86901 BLOOD TYPING SEROLOGIC RH(D): CPT

## 2021-12-23 PROCEDURE — 82803 BLOOD GASES ANY COMBINATION: CPT

## 2021-12-23 PROCEDURE — 85730 THROMBOPLASTIN TIME PARTIAL: CPT

## 2021-12-23 PROCEDURE — 700117 HCHG RX CONTRAST REV CODE 255: Performed by: STUDENT IN AN ORGANIZED HEALTH CARE EDUCATION/TRAINING PROGRAM

## 2021-12-23 PROCEDURE — 04V43DZ RESTRICTION OF SPLENIC ARTERY WITH INTRALUMINAL DEVICE, PERCUTANEOUS APPROACH: ICD-10-PCS | Performed by: RADIOLOGY

## 2021-12-23 PROCEDURE — 700105 HCHG RX REV CODE 258: Performed by: STUDENT IN AN ORGANIZED HEALTH CARE EDUCATION/TRAINING PROGRAM

## 2021-12-23 PROCEDURE — 700105 HCHG RX REV CODE 258: Performed by: HOSPITALIST

## 2021-12-23 PROCEDURE — 85384 FIBRINOGEN ACTIVITY: CPT

## 2021-12-23 PROCEDURE — 700111 HCHG RX REV CODE 636 W/ 250 OVERRIDE (IP): Performed by: HOSPITALIST

## 2021-12-23 PROCEDURE — B41B1ZZ FLUOROSCOPY OF OTHER INTRA-ABDOMINAL ARTERIES USING LOW OSMOLAR CONTRAST: ICD-10-PCS | Performed by: RADIOLOGY

## 2021-12-23 PROCEDURE — A9270 NON-COVERED ITEM OR SERVICE: HCPCS | Performed by: HOSPITALIST

## 2021-12-23 PROCEDURE — 82140 ASSAY OF AMMONIA: CPT

## 2021-12-23 PROCEDURE — 84100 ASSAY OF PHOSPHORUS: CPT

## 2021-12-23 PROCEDURE — 770022 HCHG ROOM/CARE - ICU (200)

## 2021-12-23 PROCEDURE — 85014 HEMATOCRIT: CPT

## 2021-12-23 PROCEDURE — 74176 CT ABD & PELVIS W/O CONTRAST: CPT | Mod: MC

## 2021-12-23 PROCEDURE — 85018 HEMOGLOBIN: CPT

## 2021-12-23 PROCEDURE — 700111 HCHG RX REV CODE 636 W/ 250 OVERRIDE (IP): Performed by: STUDENT IN AN ORGANIZED HEALTH CARE EDUCATION/TRAINING PROGRAM

## 2021-12-23 PROCEDURE — 99291 CRITICAL CARE FIRST HOUR: CPT | Performed by: HOSPITALIST

## 2021-12-23 PROCEDURE — P9016 RBC LEUKOCYTES REDUCED: HCPCS

## 2021-12-23 PROCEDURE — A9270 NON-COVERED ITEM OR SERVICE: HCPCS | Performed by: INTERNAL MEDICINE

## 2021-12-23 PROCEDURE — B4131ZZ FLUOROSCOPY OF SPLENIC ARTERIES USING LOW OSMOLAR CONTRAST: ICD-10-PCS | Performed by: RADIOLOGY

## 2021-12-23 PROCEDURE — 700102 HCHG RX REV CODE 250 W/ 637 OVERRIDE(OP): Performed by: HOSPITALIST

## 2021-12-23 PROCEDURE — 700102 HCHG RX REV CODE 250 W/ 637 OVERRIDE(OP): Performed by: INTERNAL MEDICINE

## 2021-12-23 PROCEDURE — 71260 CT THORAX DX C+: CPT | Mod: MC

## 2021-12-23 PROCEDURE — 82962 GLUCOSE BLOOD TEST: CPT

## 2021-12-23 PROCEDURE — 700101 HCHG RX REV CODE 250: Performed by: STUDENT IN AN ORGANIZED HEALTH CARE EDUCATION/TRAINING PROGRAM

## 2021-12-23 PROCEDURE — 83735 ASSAY OF MAGNESIUM: CPT

## 2021-12-23 PROCEDURE — 36430 TRANSFUSION BLD/BLD COMPNT: CPT

## 2021-12-23 PROCEDURE — 85610 PROTHROMBIN TIME: CPT

## 2021-12-23 PROCEDURE — 700117 HCHG RX CONTRAST REV CODE 255: Performed by: HOSPITALIST

## 2021-12-23 PROCEDURE — 87040 BLOOD CULTURE FOR BACTERIA: CPT

## 2021-12-23 PROCEDURE — 86900 BLOOD TYPING SEROLOGIC ABO: CPT

## 2021-12-23 PROCEDURE — 86923 COMPATIBILITY TEST ELECTRIC: CPT

## 2021-12-23 PROCEDURE — 99153 MOD SED SAME PHYS/QHP EA: CPT

## 2021-12-23 PROCEDURE — 85576 BLOOD PLATELET AGGREGATION: CPT | Mod: 91

## 2021-12-23 PROCEDURE — 36600 WITHDRAWAL OF ARTERIAL BLOOD: CPT

## 2021-12-23 PROCEDURE — 80048 BASIC METABOLIC PNL TOTAL CA: CPT

## 2021-12-23 PROCEDURE — 700111 HCHG RX REV CODE 636 W/ 250 OVERRIDE (IP): Performed by: RADIOLOGY

## 2021-12-23 PROCEDURE — 700111 HCHG RX REV CODE 636 W/ 250 OVERRIDE (IP)

## 2021-12-23 PROCEDURE — 86850 RBC ANTIBODY SCREEN: CPT

## 2021-12-23 PROCEDURE — 85027 COMPLETE CBC AUTOMATED: CPT

## 2021-12-23 PROCEDURE — 85347 COAGULATION TIME ACTIVATED: CPT

## 2021-12-23 PROCEDURE — 84132 ASSAY OF SERUM POTASSIUM: CPT

## 2021-12-23 PROCEDURE — 700111 HCHG RX REV CODE 636 W/ 250 OVERRIDE (IP): Performed by: INTERNAL MEDICINE

## 2021-12-23 PROCEDURE — 83605 ASSAY OF LACTIC ACID: CPT

## 2021-12-23 PROCEDURE — 85007 BL SMEAR W/DIFF WBC COUNT: CPT

## 2021-12-23 PROCEDURE — 99233 SBSQ HOSP IP/OBS HIGH 50: CPT | Mod: GC | Performed by: STUDENT IN AN ORGANIZED HEALTH CARE EDUCATION/TRAINING PROGRAM

## 2021-12-23 PROCEDURE — 84295 ASSAY OF SERUM SODIUM: CPT

## 2021-12-23 PROCEDURE — C9113 INJ PANTOPRAZOLE SODIUM, VIA: HCPCS | Performed by: STUDENT IN AN ORGANIZED HEALTH CARE EDUCATION/TRAINING PROGRAM

## 2021-12-23 PROCEDURE — 82330 ASSAY OF CALCIUM: CPT

## 2021-12-23 PROCEDURE — 84145 PROCALCITONIN (PCT): CPT

## 2021-12-23 RX ORDER — SODIUM CHLORIDE 9 MG/ML
500 INJECTION, SOLUTION INTRAVENOUS
Status: ACTIVE | OUTPATIENT
Start: 2021-12-23 | End: 2021-12-23

## 2021-12-23 RX ORDER — DIPHENHYDRAMINE HYDROCHLORIDE 50 MG/ML
INJECTION INTRAMUSCULAR; INTRAVENOUS
Status: COMPLETED
Start: 2021-12-23 | End: 2021-12-23

## 2021-12-23 RX ORDER — HYDROMORPHONE HYDROCHLORIDE 1 MG/ML
.5-1 INJECTION, SOLUTION INTRAMUSCULAR; INTRAVENOUS; SUBCUTANEOUS
Status: DISCONTINUED | OUTPATIENT
Start: 2021-12-23 | End: 2021-12-29 | Stop reason: HOSPADM

## 2021-12-23 RX ORDER — DIPHENHYDRAMINE HYDROCHLORIDE 50 MG/ML
50 INJECTION INTRAMUSCULAR; INTRAVENOUS ONCE
Status: COMPLETED | OUTPATIENT
Start: 2021-12-23 | End: 2021-12-23

## 2021-12-23 RX ORDER — DIPHENHYDRAMINE HYDROCHLORIDE 50 MG/ML
25 INJECTION INTRAMUSCULAR; INTRAVENOUS ONCE
Status: COMPLETED | OUTPATIENT
Start: 2021-12-23 | End: 2021-12-23

## 2021-12-23 RX ORDER — PANTOPRAZOLE SODIUM 40 MG/10ML
40 INJECTION, POWDER, LYOPHILIZED, FOR SOLUTION INTRAVENOUS 2 TIMES DAILY
Status: DISCONTINUED | OUTPATIENT
Start: 2021-12-23 | End: 2021-12-25

## 2021-12-23 RX ORDER — METHYLPREDNISOLONE SODIUM SUCCINATE 40 MG/ML
40 INJECTION, POWDER, LYOPHILIZED, FOR SOLUTION INTRAMUSCULAR; INTRAVENOUS EVERY 6 HOURS
Status: COMPLETED | OUTPATIENT
Start: 2021-12-23 | End: 2021-12-24

## 2021-12-23 RX ORDER — SODIUM CHLORIDE 9 MG/ML
INJECTION, SOLUTION INTRAVENOUS CONTINUOUS
Status: ACTIVE | OUTPATIENT
Start: 2021-12-23 | End: 2021-12-23

## 2021-12-23 RX ORDER — HYDROMORPHONE HYDROCHLORIDE 1 MG/ML
1 INJECTION, SOLUTION INTRAMUSCULAR; INTRAVENOUS; SUBCUTANEOUS ONCE
Status: DISPENSED | OUTPATIENT
Start: 2021-12-23 | End: 2021-12-24

## 2021-12-23 RX ORDER — DEXMEDETOMIDINE HYDROCHLORIDE 4 UG/ML
.1-1.5 INJECTION, SOLUTION INTRAVENOUS CONTINUOUS
Status: DISCONTINUED | OUTPATIENT
Start: 2021-12-23 | End: 2021-12-24

## 2021-12-23 RX ORDER — PROTAMINE SULFATE 10 MG/ML
25 INJECTION, SOLUTION INTRAVENOUS ONCE
Status: COMPLETED | OUTPATIENT
Start: 2021-12-23 | End: 2021-12-23

## 2021-12-23 RX ORDER — KETOROLAC TROMETHAMINE 30 MG/ML
15 INJECTION, SOLUTION INTRAMUSCULAR; INTRAVENOUS ONCE
Status: COMPLETED | OUTPATIENT
Start: 2021-12-23 | End: 2021-12-23

## 2021-12-23 RX ORDER — SODIUM CHLORIDE, SODIUM LACTATE, POTASSIUM CHLORIDE, AND CALCIUM CHLORIDE .6; .31; .03; .02 G/100ML; G/100ML; G/100ML; G/100ML
500 INJECTION, SOLUTION INTRAVENOUS ONCE
Status: COMPLETED | OUTPATIENT
Start: 2021-12-23 | End: 2021-12-24

## 2021-12-23 RX ORDER — DIPHENHYDRAMINE HYDROCHLORIDE 50 MG/ML
25 INJECTION INTRAMUSCULAR; INTRAVENOUS
Status: DISCONTINUED | OUTPATIENT
Start: 2021-12-23 | End: 2021-12-23

## 2021-12-23 RX ORDER — POTASSIUM CHLORIDE 7.45 MG/ML
10 INJECTION INTRAVENOUS
Status: ACTIVE | OUTPATIENT
Start: 2021-12-23 | End: 2021-12-23

## 2021-12-23 RX ORDER — DIPHENHYDRAMINE HYDROCHLORIDE 50 MG/ML
50 INJECTION INTRAMUSCULAR; INTRAVENOUS
Status: DISCONTINUED | OUTPATIENT
Start: 2021-12-23 | End: 2021-12-23

## 2021-12-23 RX ORDER — MAGNESIUM SULFATE HEPTAHYDRATE 40 MG/ML
2 INJECTION, SOLUTION INTRAVENOUS ONCE
Status: COMPLETED | OUTPATIENT
Start: 2021-12-23 | End: 2021-12-23

## 2021-12-23 RX ORDER — MIDAZOLAM HYDROCHLORIDE 1 MG/ML
.5-2 INJECTION INTRAMUSCULAR; INTRAVENOUS PRN
Status: ACTIVE | OUTPATIENT
Start: 2021-12-23 | End: 2021-12-23

## 2021-12-23 RX ORDER — SODIUM CHLORIDE 9 MG/ML
INJECTION, SOLUTION INTRAVENOUS CONTINUOUS
Status: DISCONTINUED | OUTPATIENT
Start: 2021-12-23 | End: 2021-12-23

## 2021-12-23 RX ORDER — MIDAZOLAM HYDROCHLORIDE 1 MG/ML
INJECTION INTRAMUSCULAR; INTRAVENOUS
Status: COMPLETED
Start: 2021-12-23 | End: 2021-12-23

## 2021-12-23 RX ORDER — HYDROMORPHONE HYDROCHLORIDE 1 MG/ML
.5-1 INJECTION, SOLUTION INTRAMUSCULAR; INTRAVENOUS; SUBCUTANEOUS ONCE
Status: DISCONTINUED | OUTPATIENT
Start: 2021-12-23 | End: 2021-12-23

## 2021-12-23 RX ORDER — ONDANSETRON 2 MG/ML
4 INJECTION INTRAMUSCULAR; INTRAVENOUS PRN
Status: ACTIVE | OUTPATIENT
Start: 2021-12-23 | End: 2021-12-23

## 2021-12-23 RX ORDER — METHYLPREDNISOLONE SODIUM SUCCINATE 40 MG/ML
40 INJECTION, POWDER, LYOPHILIZED, FOR SOLUTION INTRAMUSCULAR; INTRAVENOUS
Status: DISCONTINUED | OUTPATIENT
Start: 2021-12-23 | End: 2021-12-23

## 2021-12-23 RX ADMIN — HYDROCORTISONE SODIUM SUCCINATE 100 MG: 100 INJECTION, POWDER, FOR SOLUTION INTRAMUSCULAR; INTRAVENOUS at 22:07

## 2021-12-23 RX ADMIN — FENTANYL CITRATE 50 MCG: 50 INJECTION, SOLUTION INTRAMUSCULAR; INTRAVENOUS at 09:25

## 2021-12-23 RX ADMIN — PROTAMINE SULFATE 25 MG: 10 INJECTION, SOLUTION INTRAVENOUS at 10:15

## 2021-12-23 RX ADMIN — HYDROMORPHONE HYDROCHLORIDE 1 MG: 1 INJECTION, SOLUTION INTRAMUSCULAR; INTRAVENOUS; SUBCUTANEOUS at 11:05

## 2021-12-23 RX ADMIN — OMEPRAZOLE 40 MG: 20 CAPSULE, DELAYED RELEASE ORAL at 05:07

## 2021-12-23 RX ADMIN — SODIUM CHLORIDE, POTASSIUM CHLORIDE, SODIUM LACTATE AND CALCIUM CHLORIDE 500 ML: 600; 310; 30; 20 INJECTION, SOLUTION INTRAVENOUS at 09:30

## 2021-12-23 RX ADMIN — DIPHENHYDRAMINE HYDROCHLORIDE 25 MG: 50 INJECTION INTRAMUSCULAR; INTRAVENOUS at 17:12

## 2021-12-23 RX ADMIN — OXYCODONE HYDROCHLORIDE 10 MG: 10 TABLET ORAL at 03:10

## 2021-12-23 RX ADMIN — DEXMEDETOMIDINE 0.8 MCG/KG/HR: 200 INJECTION, SOLUTION INTRAVENOUS at 22:07

## 2021-12-23 RX ADMIN — HYDROMORPHONE HYDROCHLORIDE 0.5 MG: 1 INJECTION, SOLUTION INTRAMUSCULAR; INTRAVENOUS; SUBCUTANEOUS at 12:30

## 2021-12-23 RX ADMIN — HYDROMORPHONE HYDROCHLORIDE 1 MG: 1 INJECTION, SOLUTION INTRAMUSCULAR; INTRAVENOUS; SUBCUTANEOUS at 14:39

## 2021-12-23 RX ADMIN — HYDROCORTISONE SODIUM SUCCINATE 100 MG: 100 INJECTION, POWDER, FOR SOLUTION INTRAMUSCULAR; INTRAVENOUS at 15:15

## 2021-12-23 RX ADMIN — DIPHENHYDRAMINE HYDROCHLORIDE: 50 INJECTION, SOLUTION INTRAMUSCULAR; INTRAVENOUS at 09:30

## 2021-12-23 RX ADMIN — FENTANYL CITRATE 50 MCG: 50 INJECTION, SOLUTION INTRAMUSCULAR; INTRAVENOUS at 16:17

## 2021-12-23 RX ADMIN — MIDAZOLAM 0.5 MG: 1 INJECTION INTRAMUSCULAR; INTRAVENOUS at 15:46

## 2021-12-23 RX ADMIN — MAGNESIUM SULFATE HEPTAHYDRATE 2 G: 40 INJECTION, SOLUTION INTRAVENOUS at 13:13

## 2021-12-23 RX ADMIN — MIDAZOLAM 0.5 MG: 1 INJECTION INTRAMUSCULAR; INTRAVENOUS at 15:42

## 2021-12-23 RX ADMIN — FENTANYL CITRATE 50 MCG: 50 INJECTION, SOLUTION INTRAMUSCULAR; INTRAVENOUS at 13:30

## 2021-12-23 RX ADMIN — IOHEXOL 105 ML: 300 INJECTION, SOLUTION INTRAVENOUS at 16:00

## 2021-12-23 RX ADMIN — METHYLPREDNISOLONE SODIUM SUCCINATE 40 MG: 40 INJECTION, POWDER, FOR SOLUTION INTRAMUSCULAR; INTRAVENOUS at 17:53

## 2021-12-23 RX ADMIN — FENTANYL CITRATE 25 MCG: 50 INJECTION, SOLUTION INTRAMUSCULAR; INTRAVENOUS at 15:46

## 2021-12-23 RX ADMIN — DIPHENHYDRAMINE HYDROCHLORIDE: 50 INJECTION INTRAMUSCULAR; INTRAVENOUS at 09:30

## 2021-12-23 RX ADMIN — HYDROMORPHONE HYDROCHLORIDE 0.5 MG: 1 INJECTION, SOLUTION INTRAMUSCULAR; INTRAVENOUS; SUBCUTANEOUS at 23:46

## 2021-12-23 RX ADMIN — LEVOTHYROXINE SODIUM 75 MCG: 0.07 TABLET ORAL at 05:07

## 2021-12-23 RX ADMIN — MIDAZOLAM 1 MG: 1 INJECTION INTRAMUSCULAR; INTRAVENOUS at 16:04

## 2021-12-23 RX ADMIN — METHYLPREDNISOLONE SODIUM SUCCINATE 40 MG: 40 INJECTION, POWDER, FOR SOLUTION INTRAMUSCULAR; INTRAVENOUS at 23:48

## 2021-12-23 RX ADMIN — DEXMEDETOMIDINE 0.2 MCG/KG/HR: 200 INJECTION, SOLUTION INTRAVENOUS at 17:19

## 2021-12-23 RX ADMIN — FLUDROCORTISONE ACETATE 0.2 MG: 0.1 TABLET ORAL at 05:06

## 2021-12-23 RX ADMIN — GABAPENTIN 200 MG: 100 CAPSULE ORAL at 05:07

## 2021-12-23 RX ADMIN — INSULIN HUMAN 5 UNITS: 100 INJECTION, SUSPENSION SUBCUTANEOUS at 20:28

## 2021-12-23 RX ADMIN — IOHEXOL 100 ML: 350 INJECTION, SOLUTION INTRAVENOUS at 14:03

## 2021-12-23 RX ADMIN — FENTANYL CITRATE 25 MCG: 50 INJECTION, SOLUTION INTRAMUSCULAR; INTRAVENOUS at 15:42

## 2021-12-23 RX ADMIN — ENOXAPARIN SODIUM 100 MG: 100 INJECTION SUBCUTANEOUS at 05:07

## 2021-12-23 RX ADMIN — MIDAZOLAM 1 MG: 1 INJECTION INTRAMUSCULAR; INTRAVENOUS at 16:17

## 2021-12-23 RX ADMIN — METHYLPREDNISOLONE SODIUM SUCCINATE 40 MG: 40 INJECTION, POWDER, FOR SOLUTION INTRAMUSCULAR; INTRAVENOUS at 12:59

## 2021-12-23 RX ADMIN — VANCOMYCIN HYDROCHLORIDE 1500 MG: 1 INJECTION, POWDER, LYOPHILIZED, FOR SOLUTION INTRAVENOUS at 16:30

## 2021-12-23 RX ADMIN — FENTANYL CITRATE 50 MCG: 50 INJECTION, SOLUTION INTRAMUSCULAR; INTRAVENOUS at 16:04

## 2021-12-23 RX ADMIN — DEXAMETHASONE 0.5 MG: 1 TABLET ORAL at 05:11

## 2021-12-23 RX ADMIN — FUROSEMIDE 20 MG: 20 TABLET ORAL at 05:07

## 2021-12-23 RX ADMIN — HYDROCORTISONE SODIUM SUCCINATE 100 MG: 100 INJECTION, POWDER, FOR SOLUTION INTRAMUSCULAR; INTRAVENOUS at 10:06

## 2021-12-23 RX ADMIN — LIOTHYRONINE SODIUM 25 MCG: 25 TABLET ORAL at 06:32

## 2021-12-23 RX ADMIN — FENTANYL CITRATE 50 MCG: 50 INJECTION, SOLUTION INTRAMUSCULAR; INTRAVENOUS at 10:35

## 2021-12-23 RX ADMIN — HYDROMORPHONE HYDROCHLORIDE 1 MG: 1 INJECTION, SOLUTION INTRAMUSCULAR; INTRAVENOUS; SUBCUTANEOUS at 17:53

## 2021-12-23 RX ADMIN — PANTOPRAZOLE SODIUM 40 MG: 40 INJECTION, POWDER, FOR SOLUTION INTRAVENOUS at 17:52

## 2021-12-23 RX ADMIN — HYDROCORTISONE 20 MG: 20 TABLET ORAL at 05:06

## 2021-12-23 RX ADMIN — KETOROLAC TROMETHAMINE 15 MG: 30 INJECTION, SOLUTION INTRAMUSCULAR; INTRAVENOUS at 01:05

## 2021-12-23 ASSESSMENT — ENCOUNTER SYMPTOMS
PALPITATIONS: 0
MYALGIAS: 1
FEVER: 0
CLAUDICATION: 0
COUGH: 1
SPEECH CHANGE: 0
SENSORY CHANGE: 0
BLOOD IN STOOL: 0
DIZZINESS: 0
DEPRESSION: 0
BLURRED VISION: 0
PHOTOPHOBIA: 0
SORE THROAT: 0
LOSS OF CONSCIOUSNESS: 0
HEARTBURN: 0
SEIZURES: 0
SHORTNESS OF BREATH: 1
MEMORY LOSS: 0
MYALGIAS: 0
DOUBLE VISION: 0
CHILLS: 0
NAUSEA: 0
COUGH: 0
DIARRHEA: 0
BACK PAIN: 0
PND: 0
WHEEZING: 0
VOMITING: 0
ABDOMINAL PAIN: 1
HEADACHES: 0
WEAKNESS: 1
HEMOPTYSIS: 0

## 2021-12-23 ASSESSMENT — PAIN DESCRIPTION - PAIN TYPE
TYPE: ACUTE PAIN

## 2021-12-23 ASSESSMENT — FIBROSIS 4 INDEX: FIB4 SCORE: 0.52

## 2021-12-23 NOTE — THERAPY
Missed Therapy     Patient Name: Donna Isaac  Age:  56 y.o., Sex:  female  Medical Record #: [id number]  Today's Date: 12/23/2021    Discussed missed therapy with RN       12/23/21 [id number]   Interdisciplinary Plan of Care Collaboration   Collaboration Comments Attempted OT tx. Per RN pt leaving for CT; presenting with high abdominal pain. Will attempt again.

## 2021-12-23 NOTE — PROGRESS NOTES
[facility] Medicine Daily Progress Note    Date of Service  12/23/2021    Chief Complaint  Donna Isaac is a 56 y.o. female admitted 12/1/2021 with abd/chest pain    Hospital Course  Ms Isaac has a complicated past medical history that includes traumatic brain injury, congestive heart failure and Valentín's disease.  She presented the emergency room on 12/1/2021 with complaints of abdominal pain.  Patient was found to have a pulmonary embolism and acute pancreatitis.  Patient started on heparin drip and admitted to the hospital she subsequently developed shock,  she was started on pressors and orders were changed to admit to the ICU.  While waiting for transfer the patient had cardiac arrest in the emergency room, CPR was per formed, she was intubated, she was resuscitated with massive transfusion protocol.  Patient has been supported with ventilator, and IVC filter has been placed 12/6 , her renal function has improved and she was extubated on 12/13/2021.  The patient diagnosed with left upper venous thrombosis including subclavian vein, left upper extremity venous thrombosis axillary vein, as well as acute deep vein thrombosis in the left posterior tibial vein.    Interval Problem Update  Patient seen and examined today.    Patient tolerating treatment and therapies.  All Data, Medication data reviewed.  Case discussed with nursing as available.  Plan of Care reviewed with patient and notified of changes.  12/21 the patient is afebrile, she is found with a pulse rate around 80, respirations unlabored in the 20s, 3 L nasal cannula oxygen, laboratory data indicated slightly low hemoglobin at 7.9, no evidence of acute bleeding, chemistry overall benign, slightly elevated alk phos, low albumin at 2.3, patient is complaining of left foot and ankle pain, x-ray without bony abnormality, significant soft tissue swelling.  12/22 the patient feels overall better, she does have some pains and aches but overall  controlled, she is looking forward to her therapy, apparently [facility] has no availability today, laboratory data is reviewed, H&H is currently stable, chemistry reveals a low potassium and low magnesium, apparently patient had some diarrhea, her iron stores are adequate, electrolytes are being replaced, the patient updated on plan and to wrap her left lower extremity secondary to some significant edema from her DVT, anticoagulation is currently tolerated, no evidence of bleeding.  The patient episode of low blood sugar, instructed the patient to ask for a nighttime snack for sure  12/23 the patient worsening status overnight, developed severe abdominal pain left upper quadrant and chest discomfort, had some hypotensive episodes, given midodrine, fluid bolus and adjusted Florinef overnight, the patient with significant pain management issues overnight and is very uncomfortable this morning, worsening overall appearing status, ordering stat CT chest abdomen pelvis given the patient's anticoagulation status, the patient received Lovenox this morning unfortunately, discussing with intensive care to move the patient to ICU status.  Follow-up CBC lowered to hemoglobin of 7.9  Imaging is currently underway, the patient is getting resuscitated and placed back on pressors to improve her hemodynamic stability    I have personally seen and examined the patient at bedside. I discussed the plan of care with patient, bedside RN and pharmacy.    Consultants/Specialty  general surgery and nephrology  Critical care  Code Status  DNAR/DNI    Disposition  Patient is not medically cleared.   Anticipate discharge to to an inpatient rehabilitation hospital./[facility]  I have placed the appropriate orders for post-discharge needs.    Review of Systems  Review of Systems   Constitutional: Negative for chills and fever.   Eyes: Negative for blurred vision and double vision.   Respiratory: Positive for cough and shortness of breath.     Cardiovascular: Positive for chest pain and leg swelling. Negative for palpitations.   Gastrointestinal: Positive for abdominal pain. Negative for diarrhea, nausea and vomiting.   Genitourinary: Negative for dysuria and urgency.   Musculoskeletal: Positive for joint pain and myalgias. Negative for back pain.   Skin: Negative for rash.   Neurological: Positive for weakness. Negative for dizziness, loss of consciousness and headaches.        Physical Exam  Temp:  [36.2 °C (97.2 °F)-38.7 °C (101.7 °F)] 38.6 °C (101.5 °F)  Pulse:  [] 113  Resp:  [15-40] 30  BP: ()/(57-92) 90/62  SpO2:  [94 %-100 %] 98 %    Physical Exam  Vitals reviewed.   Constitutional:       General: She is not in acute distress.     Appearance: She is well-developed. She is obese. She is not diaphoretic.   HENT:      Head: Normocephalic and atraumatic.   Neck:      Vascular: No JVD.   Cardiovascular:      Rate and Rhythm: Normal rate and regular rhythm.      Comments: Chest pain with pressure  Pulmonary:      Effort: Pulmonary effort is normal. No respiratory distress.      Breath sounds: No stridor. No wheezing or rales.   Abdominal:      General: There is distension.      Palpations: Abdomen is soft.      Tenderness: There is abdominal tenderness. There is guarding. There is no rebound.   Musculoskeletal:      Right lower leg: Edema present.      Left lower leg: Edema present.      Comments: 3+ pitting edema to mid thigh   Skin:     General: Skin is warm and dry.      Findings: No rash.   Neurological:      General: No focal deficit present.      Mental Status: She is alert and oriented to person, place, and time. Mental status is at baseline.   Psychiatric:         Mood and Affect: Mood normal.         Thought Content: Thought content normal.         Fluids    Intake/Output Summary (Last 24 hours) at 12/23/2021 1312  Last data filed at 12/23/2021 0600  Gross per 24 hour   Intake 800 ml   Output 800 ml   Net 0 ml        Laboratory  Recent Labs     12/22/21  0544 12/23/21  0430 12/23/21  1002   WBC 6.9 9.8 11.9*   RBC 3.20* 3.03* 2.90*   HEMOGLOBIN 8.5* 8.3* 7.9*   HEMATOCRIT 29.7* 28.0* 27.3*   MCV 92.8 92.4 94.1   MCH 26.6* 27.4 27.2   MCHC 28.6* 29.6* 28.9*   RDW 77.0* 75.6* 77.6*   PLATELETCT 329 358 375   MPV 10.7 10.2 10.4     Recent Labs     12/22/21  0544 12/23/21  0430 12/23/21  1002   SODIUM 140 141 140   POTASSIUM 3.2* 3.8 3.8   CHLORIDE 105 108 107   CO2 23 25 23   GLUCOSE 198* 130* 132*   BUN 7* 7* 8   CREATININE 0.50 0.58 0.61   CALCIUM 7.9* 7.8* 7.6*     Recent Labs     12/23/21  1002   APTT 40.3*   INR 1.32*               Imaging  CT-CHEST,ABDOMEN,PELVIS W/O   Final Result      1.  Large, 15 cm hematoma between the greater curvature of the excluded stomach and transverse colon. It is new since prior study.   2.  A smaller, 5.6 cm hematoma posterior to the gastric fundus, also new, possibly in the gastric fundus wall or at the bypass surgical staple line.   3.  Worsening hemoperitoneum.   4.  Slightly hyperdense moderate left effusion with associated atelectasis. Trace right effusion.   5.  Scattered groundglass opacities in the upper lobes, new since prior study, likely infectious/inflammatory. Active pneumonia should be considered.   6.  A 4 cm ascending aortic aneurysm.   7.  Findings of anemia.      Findings were discussed with Dr. Mirza on 12/23/2021 12:12 PM.      DX-FOOT-2- LEFT   Final Result      Soft tissue swelling without acute osseous abnormality.      DX-CHEST-PORTABLE (1 VIEW)   Final Result         1.  Mild edema and/or infiltrate, particularly on the right, stable since prior study   2.  Cardiomegaly      DX-CHEST-PORTABLE (1 VIEW)   Final Result         1.  Mild edema and/or infiltrate, particularly on the right   2.  Cardiomegaly      DX-CHEST-PORTABLE (1 VIEW)   Final Result         1.  Bibasilar atelectasis or early infiltrate   2.  Cardiomegaly      DX-CHEST-PORTABLE (1 VIEW)   Final Result       1.  Bibasilar underinflation atelectasis which could obscure an additional process. This is unchanged.   2.  Suspect small LEFT pleural effusion      DX-CHEST-PORTABLE (1 VIEW)   Final Result         1.  Bilateral basilar atelectasis, no focal infiltrate   2.  Cardiomegaly      DX-CHEST-PORTABLE (1 VIEW)   Final Result         1.  Bilateral basilar atelectasis, no focal infiltrate   2.  Cardiomegaly      DX-CHEST-PORTABLE (1 VIEW)   Final Result         1.  Left lower lobe atelectasis or infiltrates, similar compared to prior study.   2.  Cardiomegaly      DX-CHEST-PORTABLE (1 VIEW)   Final Result         1.  Left lower lobe atelectasis or infiltrates, similar compared to prior study.   2.  Cardiomegaly      DX-CHEST-PORTABLE (1 VIEW)   Final Result         1.  Left lower lobe atelectasis or infiltrates, similar compared to prior study.   2.  Cardiomegaly      IR-INSERT IVC FILTER WITH IG & SI   Final Result      Ultrasound and fluoroscopic guided placement of an Option Elite IVC filter.      DX-CHEST-PORTABLE (1 VIEW)   Final Result         1.  Pulmonary edema and/or infiltrates are identified, which are stable since the prior exam.   2.  Cardiomegaly      DX-CHEST-PORTABLE (1 VIEW)   Final Result      Tubes and lines as described above.      Bibasilar atelectasis with small pleural effusions.      DX-CHEST-PORTABLE (1 VIEW)   Final Result         1. No significant interval change.      LB-ONUHIBE-8 VIEW   Final Result      Cortrak feeding tube tip projects in the region of the mid stomach.      DX-CHEST-PORTABLE (1 VIEW)   Final Result      Placement of right IJ dialysis catheter.      Otherwise stable findings.      DX-CHEST-PORTABLE (1 VIEW)   Final Result         1.  Pulmonary edema and/or infiltrates are identified, which are stable since the prior exam.   2.  Cardiomegaly      DX-CHEST-PORTABLE (1 VIEW)   Final Result         1.  Pulmonary edema and/or infiltrates are identified, which are stable since  the prior exam.   2.  Cardiomegaly      DX-CHEST-FOR LINE PLACEMENT Perform procedure in: Patient's Room   Final Result      Interval placement of a left IJ central venous catheter with the tip overlying the right atrium.      US-EXTREMITY VENOUS LOWER BILAT   Final Result      CTA ABDOMEN PELVIS W & W/O POST PROCESS   Final Result      1.  Small to moderate amount of hemoperitoneum in the abdomen and pelvis. No active extravasation is identified.   2.  No aortic aneurysm or dissection.   3.  Peripancreatic stranding can be related to pancreatitis.   4.  Cardiomegaly.   5.  Hepatic steatosis.   6.  Status post cholecystectomy.   7.  Atrophic kidneys bilaterally.   8.  Status post gastric bypass surgery.   9.  Mild wall thickening of the second portion of the duodenum with surrounding inflammation. This can be seen in duodenitis/peptic ulcer disease.   10.  Colonic diverticulosis.   11.  Bibasilar atelectasis/consolidation. Groundglass opacities with septal thickening at the lung bases can be seen in the setting of edema or multifocal pneumonia.   12.  Bilateral anterior rib fractures.      Findings discussed with Dr. Rush.      DX-CHEST-PORTABLE (1 VIEW)   Final Result      1.  Endotracheal tube present.      2.  Cardiomegaly with interstitial prominence.      EC-ECHOCARDIOGRAM COMPLETE W/ CONT   Final Result      CT-CTA CHEST PULMONARY ARTERY W/ RECONS   Final Result      1.  Positive for pulmonary embolism located in multiple segmental and subsegmental branches      2.  Minimal elevation of RV/LV ratio      3.  Mild atelectasis      4.  Left PICC line present and appears appropriately located      5.  Findings were discussed with BLAINE VEGA on 12/1/2021 5:35 PM.                  CT-ABDOMEN-PELVIS WITH   Final Result      1.  Apparent inflammation of the pancreas with surrounding fat stranding and fluid suggesting pancreatitis. Recommend correlation with lipase.      2.  Diverticulosis without  diverticulitis.      3.  Hepatic steatosis.      US-EXTREMITY VENOUS UPPER UNILAT LEFT   Final Result      DX-CHEST-PORTABLE (1 VIEW)   Final Result      1.  No acute cardiopulmonary abnormality identified.      2.  Left PICC line appears appropriately located      CT-CHEST,ABDOMEN,PELVIS WITH    (Results Pending)        Assessment/Plan  * Left upper quadrant abdominal pain  Assessment & Plan  Acute right concern of rebleeding, currently on full anticoagulation with Lovenox  Stat CT abdomen chest pelvis  Discussed with intensive care physician to transfer to ICU  Volume resuscitation, hemodynamic support      Hemorrhagic shock (HCC)  Assessment & Plan  Recurrent episode, the patient now identified to have a 15cm left upper quadrant hematoma  Transferred to ICU care      Acute respiratory failure with hypoxia (HCC)- (present on admission)  Assessment & Plan  Intubated due to hemorrhagic shock  Extubated 12/13  Still quite frail  DNR/I at present  IS  Mobilize  O2/RT protocols  Follow volume status carefully    Pulmonary hypertension (HCC)- (present on admission)  Assessment & Plan  ROSSI, Hx PE  Cont O2 support, diuresis    Paroxysmal atrial fibrillation (HCC)- (present on admission)  Assessment & Plan  Currently in sinus  Rate/rythm control with Carvedilol  anticoagulation currently with enoxaparin with plan to transition to DOAC on DC    Hemoperitoneum- (present on admission)  Assessment & Plan  In setting of acute pancreatitis in a pt on full dose anticoagulation  Worsened with new bleeding episode    DVT (deep venous thrombosis) (HCC)  Assessment & Plan  Upper extremity as well as lower extremity  IVC in place  Enoxaparin at full dose, close monitor  Eventual DOAC    Cardiac arrest (HCC)- (present on admission)  Assessment & Plan  Secondary to hemorrhagic shock    Shock (HCC)- (present on admission)  Assessment & Plan  Hemorrhagic from intra-abd bleed  Now resolved  Cont to follow H&H  Has been re-challenged with  Enoxaparin 100mg/kg and is tolerating thus far    Acute pancreatitis- (present on admission)  Assessment & Plan  Idiopathic, IV fluids, symptomatic management    PE (pulmonary thromboembolism) (HCC)- (present on admission)  Assessment & Plan  Tolerating enoxaparin 100mg/kg  Has IVC in place   Transition to po DOAC as DC plan becomes more clear and if no further issues with bleeding    Acute blood loss anemia- (present on admission)  Assessment & Plan  Secondary to retroperitoneal bleed, monitor check iron levels    Adrenal insufficiency (Valentín's disease) (HCC)- (present on admission)  Assessment & Plan  On home regimen per her endocrinologist: hydrocortisone, dexamthasone, florinef    MRSA and E. coli sinusitis- (present on admission)  Assessment & Plan  Patient reports daily IV infusion of antibiotics for MRSA sinusitis  Cont while in house  Get records    Acute kidney injury (HCC)- (present on admission)  Assessment & Plan  Renal function has since normalized    Hypothyroidism- (present on admission)  Assessment & Plan  Liothyronine 25  Levothyroxine 75     Plan  Critical care time 35 minutes    Stat fluid bolus, stat CT abdomen chest pelvis  Follow-up laboratory data including CBC, lactic acid, coagulation parameters  Abdiaziz steroids  Glycemic control to continue, avoid hypoglycemia  See orders  Medically complex high risk patient  Case and current status discussed in detail with the critical care physician Dr. Mirza    VTE prophylaxis: Discontinued secondary to acute bleeding    I have performed a physical exam and reviewed and updated ROS and Plan today (12/23/2021). In review of yesterday's note (12/22/2021), there are no changes except as documented above.      Please note that this dictation was created using voice recognition software. I have made every reasonable attempt to correct obvious errors, but I expect that there are errors of grammar and possibly context that I did not discover before finalizing  the note.

## 2021-12-23 NOTE — PROGRESS NOTES
Pt became hypotensive at about 1830, MD notified that patient required 100mcg of Nathan and started on a low dose of Levophed.   Continuing to monitor.

## 2021-12-23 NOTE — CARE PLAN
The patient is Watcher - Medium risk of patient condition declining or worsening    Shift Goals  Clinical Goals: pain management'  Patient Goals: rest  Family Goals: no family present    Progress made toward(s) clinical / shift goals:      Problem: Knowledge Deficit - Standard  Goal: Patient and family/care givers will demonstrate understanding of plan of care, disease process/condition, diagnostic tests and medications  Outcome: Progressing     Problem: Skin Integrity  Goal: Skin integrity is maintained or improved  Outcome: Progressing     Problem: Fall Risk  Goal: Patient will remain free from falls  Outcome: Progressing     Patient is not progressing towards the following goals: Pain         Received pt on 1 mcg of levophed. 500 ml LR bolus ordered and given. Levophed stopped at 20:30 . MAP maintaining > 65.

## 2021-12-23 NOTE — CONSULTS
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 146*  --    TBILIRUBIN 0.3  --  0.3 0.3  --     < > = values in this interval not displayed.     Recent Labs     12/21/21 0445 12/21/21 0445 12/22/21 0544 12/23/21 0430 12/23/21  1002   SODIUM 145   < > 140 141 140   POTASSIUM 3.7   < > 3.2* 3.8 3.8   CHLORIDE 111   < > 105 108 107   CO2 26   < > 23 25 23   BUN 8   < > 7* 7* 8   CREATININE 0.41*   < > 0.50 0.58 0.61   MAGNESIUM 2.0  --  1.3* 1.7  --    PHOSPHORUS 3.6  --  3.1 3.1  --    CALCIUM 7.9*   < > 7.9* 7.8* 7.6*    < > = values in this interval not displayed.     Recent Labs     12/21/21 0445 12/21/21 0445 12/22/21 0544 12/23/21 0430 12/23/21  1002   ALTSGPT 30  --  29 24  --    ASTSGOT 16  --  18 16  --    ALKPHOSPHAT 145*  --  159* 146*  --    TBILIRUBIN 0.3  --  0.3 0.3  --    GLUCOSE 86   < > 198* 130* 132*    < > = values in this interval not displayed.     Recent Labs     12/23/21  0948   ISTATAPH 7.479   ISTATAPCO2 34.3   ISTATAPO2 98*   ISTATATCO2 27   [id number] 98   ISTATARTHCO3 25.5*   ISTATARTBE 2   ISTATTEMP 98.0 F   ISTATSPEC Arterial   ISTATAPHTC 7.484   [id number] 96*     DX-FOOT-2- LEFT   Final Result      Soft tissue swelling without acute osseous abnormality.      DX-CHEST-PORTABLE (1 VIEW)   Final Result         1.  Mild edema and/or infiltrate, particularly on the right, stable since prior study    2.  Cardiomegaly      DX-CHEST-PORTABLE (1 VIEW)   Final Result         1.  Mild edema and/or infiltrate, particularly on the right   2.  Cardiomegaly      DX-CHEST-PORTABLE (1 VIEW)   Final Result         1.  Bibasilar atelectasis or early infiltrate   2.  Cardiomegaly      DX-CHEST-PORTABLE (1 VIEW)   Final Result      1.  Bibasilar underinflation atelectasis which could obscure an additional process. This is unchanged.   2.  Suspect small LEFT pleural effusion      DX-CHEST-PORTABLE (1 VIEW)   Final Result         1.  Bilateral basilar atelectasis, no focal infiltrate   2.  Cardiomegaly      DX-CHEST-PORTABLE (1 VIEW)   Final Result         1.  Bilateral basilar atelectasis, no focal infiltrate   2.  Cardiomegaly      DX-CHEST-PORTABLE (1 VIEW)   Final Result         1.  Left lower lobe atelectasis or infiltrates, similar compared to prior study.   2.  Cardiomegaly      DX-CHEST-PORTABLE (1 VIEW)   Final Result         1.  Left lower lobe atelectasis or infiltrates, similar compared to prior study.   2.  Cardiomegaly      DX-CHEST-PORTABLE (1 VIEW)   Final Result         1.  Left lower lobe atelectasis or infiltrates, similar compared to prior study.   2.  Cardiomegaly      IR-INSERT IVC FILTER WITH IG & SI   Final Result      Ultrasound and fluoroscopic guided placement of an Option Elite IVC filter.      DX-CHEST-PORTABLE (1 VIEW)   Final Result         1.  Pulmonary edema and/or infiltrates are identified, which are stable since the prior exam.   2.  Cardiomegaly      DX-CHEST-PORTABLE (1 VIEW)   Final Result      Tubes and lines as described above.      Bibasilar atelectasis with small pleural effusions.      DX-CHEST-PORTABLE (1 VIEW)   Final Result         1. No significant interval change.      [id number] VIEW   Final Result      Cortrak feeding tube tip projects in the region of the mid stomach.      DX-CHEST-PORTABLE (1 VIEW)   Final Result      Placement of right IJ dialysis catheter.      Otherwise  stable findings.      DX-CHEST-PORTABLE (1 VIEW)   Final Result         1.  Pulmonary edema and/or infiltrates are identified, which are stable since the prior exam.   2.  Cardiomegaly      DX-CHEST-PORTABLE (1 VIEW)   Final Result         1.  Pulmonary edema and/or infiltrates are identified, which are stable since the prior exam.   2.  Cardiomegaly      DX-CHEST-FOR LINE PLACEMENT Perform procedure in: Patient's Room   Final Result      Interval placement of a left IJ central venous catheter with the tip overlying the right atrium.      US-EXTREMITY VENOUS LOWER BILAT   Final Result      CTA ABDOMEN PELVIS W & W/O POST PROCESS   Final Result      1.  Small to moderate amount of hemoperitoneum in the abdomen and pelvis. No active extravasation is identified.   2.  No aortic aneurysm or dissection.   3.  Peripancreatic stranding can be related to pancreatitis.   4.  Cardiomegaly.   5.  Hepatic steatosis.   6.  Status post cholecystectomy.   7.  Atrophic kidneys bilaterally.   8.  Status post gastric bypass surgery.   9.  Mild wall thickening of the second portion of the duodenum with surrounding inflammation. This can be seen in duodenitis/peptic ulcer disease.   10.  Colonic diverticulosis.   11.  Bibasilar atelectasis/consolidation. Groundglass opacities with septal thickening at the lung bases can be seen in the setting of edema or multifocal pneumonia.   12.  Bilateral anterior rib fractures.      Findings discussed with Dr. Rush.      DX-CHEST-PORTABLE (1 VIEW)   Final Result      1.  Endotracheal tube present.      2.  Cardiomegaly with interstitial prominence.      EC-ECHOCARDIOGRAM COMPLETE W/ CONT   Final Result      CT-CTA CHEST PULMONARY ARTERY W/ RECONS   Final Result      1.  Positive for pulmonary embolism located in multiple segmental and subsegmental branches      2.  Minimal elevation of RV/LV ratio      3.  Mild atelectasis      4.  Left PICC line present and appears appropriately located      5.   Findings were discussed with BLAINE VEGA on 12/1/2021 5:35 PM.                  CT-ABDOMEN-PELVIS WITH   Final Result      1.  Apparent inflammation of the pancreas with surrounding fat stranding and fluid suggesting pancreatitis. Recommend correlation with lipase.      2.  Diverticulosis without diverticulitis.      3.  Hepatic steatosis.      US-EXTREMITY VENOUS UPPER UNILAT LEFT   Final Result      DX-CHEST-PORTABLE (1 VIEW)   Final Result      1.  No acute cardiopulmonary abnormality identified.      2.  Left PICC line appears appropriately located      CT-CHEST,ABDOMEN,PELVIS WITH    (Results Pending)   CT-CHEST,ABDOMEN,PELVIS W/O    (Results Pending)       Patient Active Problem List   Diagnosis   • Acute respiratory failure with hypoxia ([facility])   • Gastroesophageal reflux disease without esophagitis   • Fibromyalgia   • Chronic migraine without aura, not intractable   • Chronic systolic heart failure (HCC)   • Hx of gastric bypass in 2009 Decmber    • Anemia, iron deficiency, inadequate dietary intake   • Advance directive on file   • Asthma   • 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   • Depression   • Esophageal stricture   • History of abnormal Pap smear   • History of hysterectomy for benign disease   • IBS (irritable bowel syndrome)   • Osteoarthritis of right hip   • Osteoporosis   • Central perforation of tympanic membrane of right ear   • Closed fracture of distal end of right radius   • Closed fracture of right wrist   • Seizure ([facility])   • Shoulder impingement   • Syncope due to orthostatic hypotension   • Need for vaccination   • Tear of left rotator cuff s/p repair   • CKD (chronic kidney disease) stage 3, GFR 30-59 ml/min ([facility])   • Chronic cough   • Chronic rhinitis   • Hypothyroidism   • Orthostatic hypotension   • Chronic sinusitis   • Acute kidney injury (HCC)   • MRSA and E. coli sinusitis   • Acute pulmonary embolism (HCC)   • Hypogammaglobulinemia ([facility])   • Pulmonary embolism (HCC)   • Hx of  migraines   • Adrenal insufficiency (Pisgah's disease) (HCC)   • Cardiac volume overload secondary to a/c diastolic CHF   • Recurrent sinusitis   • Elevated troponin   • Abnormal nuclear stress test   • MRSA (methicillin resistant Staphylococcus aureus) sinus infection   • History of pulmonary embolus (PE)   • Thrush   • Incontinence   • DIC (disseminated intravascular coagulation) (HCC)   • Ischemic colitis (HCC)   • Coagulopathy (HCC)   • Hepatic steatosis   • Pyelonephritis   • Drug-induced liver injury   • Acute blood loss anemia   • History of bloody stools   • PE (pulmonary thromboembolism) (HCC)   • Acute pancreatitis   • Shock (HCC)   • Cardiac arrest (HCC)   • DVT (deep venous thrombosis) (HCC)   • Hemoperitoneum   • Paroxysmal atrial fibrillation (HCC)   • Pulmonary hypertension (HCC)       Assessment and Plan:      * Shock (HCC)  Assessment & Plan  On 12/2 s/p cardiac arrest secondary to hemorrhagic shock with acute gastrointestinal blood loss an acute pancreatitis   Shock resolved, weaned off norepinephrine    On 12/15 Recurrent shock -likely due to hypovolemia, multiple antihypertensives and IV narcotics  Responded to Nathan-Synephrine pops and norepinephrine infusion along with 1 L saline bolus and stress dose steroid    On 12/23 Recurrent shock-likely due to hypovolemia or ??acute GI blood loss  S/p 1 L LR bolus  Hold antihypertensives; stopped diuresis  Continue low dose levophed, titrate to keep MAP 60-65  Started on stress dose steroid and IV PPI  Lactate wnl, Hb stable  Hold lovenox due to possible GI bleeding as pt is c/o LLQ abdominal pain  CT A/P pending.     Hemoperitoneum  Assessment & Plan  As per CT A/P on 12/2; was evaluated by Surgery has  No indication for surgery at that time  Intermittently on heparin from 12/15 for DVT/PE/PAF>>transitioned to lovenox on 12/18  On 12/23 became hypotensive; possible GIB  Hold AC; repeat CT A/P pending    DVT (deep venous thrombosis) (HCC)  Assessment &  Plan  Imaging reveals LUE DVT associated with her PICC line and a distal LLE DVT - 12/1 & 12/2 studies  Diagnosed on 12/2 s/p IVC filter placement on 12/6 (AC was C/I due to intra-abdominal bleeding, shock)  Resumed heparin drip, intermittently from 12/13-12/16 due to concern of GI bleeding; hemodynamics improved & no bleeding>>>transitioned to lovenox on 12/18  Hold AC/lovenox currently due to hypotension and possible GIB  H&H stable; Monitor and transfuse to keep Hb>7      Cardiac arrest (HCC)  Assessment & Plan  S/p PEA, asystole and ventricular tachycardia in ED on 12/2 secondary to hemorrhagic shock (ROSC after 7 rounds of CPR, IV epinephrine, IV bicarbonate solution and massive transfusion protocol)  Reviewed CODE STATUS with patient and her , POLST completed by palliative care, DANR/AMBER    PE (pulmonary thromboembolism) ([facility])  Assessment & Plan  Diagnosed on 12/2 s/p IVC filter placement on 12/6 (AC was C/I due to intra-abdominal bleeding, shock)  Resumed heparin drip, hemodynamics improved & no bleeding>>>transitioned to lovenox on 12/19  Hold AC/lovenox currently due to hypotension and possible GIB  H&H stable; Monitor and transfuse to keep Hb>7        Acute blood loss anemia  Assessment & Plan  Suspect gastrointestinal source of blood loss as well as acute hemoperitoneum as per CT A/P on 12/2 s/p PRBC transfusion  No recurrence clinically  Baseline Hb 7-9 during hospitalization  Became hypotensive on 12/23; possible GI bleeding  Hold lovenox  Monitor H&H; transfuse to keep Hb>7    Adrenal insufficiency (Faulkner's disease) ([facility])  Assessment & Plan  Continue hydrocortisone  Continue Florinef, 0.1 mg twice daily      MRSA and E. coli sinusitis  Assessment & Plan  S/p 6 weeks of IV antibiotics, vancomycin and ceftriaxone (per ID- completed on 12/21)     Acute kidney injury (HCC)  Assessment & Plan  Resolved  Likely ATN secondary to shock, Cr at baseline currently  Monitor renal function and urine  output  Avoid nephrotoxins and renal dose medications      Pulmonary hypertension (HCC)  Assessment & Plan  RVSP 50 on TTE this admission   Monitor for ROSSI      Paroxysmal atrial fibrillation (HCC)  Assessment & Plan  Rate controlled, SR currently  Hold coreg due to low BP  Optimize potassium and magnesium  [id number] score high, but hold AC due to hypotension and concern for GIB.     Acute pancreatitis  Assessment & Plan  Resolved   Tolerating diet and having BMs    Hypothyroidism  Assessment & Plan  Continue levothyroxine and liothyronine  Most recent TSH borderline low, follow-up with endocrinology-she sees Dr. Jeff    Acute respiratory failure with hypoxia (HCC)  Assessment & Plan  Resolved  Intubated 12/2-extubated 12/12  RT protocols/O2 as needed          Resident Physician  Internal Medicine, [employer].

## 2021-12-23 NOTE — PROGRESS NOTES
Pt presents to IR. Pt was consented by MD at bedside, confirmed by this RN and consent at bedside. Pt transferred to IR table in supine position. Pt placed on monitor, prepped and draped in a sterile fashion. Visceral angiogram with possible intervention performed by Dr Chan. Pt tolerated procedure well, VSS, returned to unit.    [facility]rium icast covered stent 6mm x 38 mm (splenic artery)  REF# [id number]  SN# [id number]  Exp: 05/17/2024     Angio Seal VIP 8F  REF#[id number]  LOT#[id number]  EXP09/30/2022

## 2021-12-23 NOTE — ASSESSMENT & PLAN NOTE
With hemorrhagic shock status post stenting on 12/23/2021  If hemoglobin remains stable may need aspirin and Plavix for stent patency per IR

## 2021-12-23 NOTE — CONSULTS
Asked to assess hypotension.  She is currently on 1mcg of levophed with MAP ~85.  I reviewed her hospital course and background.  Given complexity I performed a bedside US.  Her LV is hyperdynamic and RV looks to be somewhat improved from previous ECHO.  Difficult to visualize her IVC though aorta is easily identifiable.  This could be due to poor window or complete collapse which would also suspect volume depletion.    Patient tells me she's had diarrhea recently.  No other current infecious symptoms.  Her only other complaint is periodic bilateral lower extremity cramping.    Plan:  - 500cc LR bolus now  - wean levophed for MAP >65, or SBP >90  - K low this morning, was replaced (will recheck now given cramping)  - Mag low this morning, was replaced (will recheck)    Jamel Tellez M.D.

## 2021-12-23 NOTE — ASSESSMENT & PLAN NOTE
Occurred twice 12/2 and 12/23 while she was on anticoagulation. Status post massive transfusion protocol and speneic artery stenting on 12/23. Shock resolved  Continue close clinical monitoring  Contraindicated at this time given recurrent severe bleed

## 2021-12-23 NOTE — PROGRESS NOTES
At shift change, patient had a status change. She had new onset severe abdominal pain, SOB, and hypotension. A Levophed drip was started at 10mcgs as well as a one liter bolus of LR.   Patient was moved to [facility], the ICU nurse will continue to monitor.

## 2021-12-23 NOTE — THERAPY
Missed Therapy     Patient Name: Donna Isaac  Age:  56 y.o., Sex:  female  Medical Record #: [id number]  Today's Date: 12/23/2021    Discussed missed therapy with RN.     Per EMR and discussion w/ RN, pt had a change in status, was upgraded to ICU status and is now NPO pending medical workup for possible GIB. SLP will hold dysphagia tx until medical workup is complete and see as appropriate.       12/23/21 1208   Treatment Variance   Reason For Missed Therapy Medical - Other (Please Comment)   Total Time Spent   Total Time Spent (Mins) 2

## 2021-12-24 PROBLEM — N17.9 ACUTE KIDNEY INJURY (HCC): Status: RESOLVED | Noted: 2021-01-26 | Resolved: 2021-12-24

## 2021-12-24 PROBLEM — D72.829 LEUKOCYTOSIS: Status: ACTIVE | Noted: 2021-12-24

## 2021-12-24 PROBLEM — I72.8 SPLENIC ARTERY ANEURYSM (HCC): Status: ACTIVE | Noted: 2021-12-23

## 2021-12-24 LAB
ALBUMIN SERPL BCP-MCNC: 2.4 G/DL (ref 3.2–4.9)
ALBUMIN/GLOB SERPL: 0.9 G/DL
ALP SERPL-CCNC: 136 U/L (ref 30–99)
ALT SERPL-CCNC: 22 U/L (ref 2–50)
ANION GAP SERPL CALC-SCNC: 14 MMOL/L (ref 7–16)
ANISOCYTOSIS BLD QL SMEAR: ABNORMAL
AST SERPL-CCNC: 24 U/L (ref 12–45)
BASOPHILS # BLD AUTO: 0 % (ref 0–1.8)
BASOPHILS # BLD: 0 K/UL (ref 0–0.12)
BILIRUB SERPL-MCNC: 0.6 MG/DL (ref 0.1–1.5)
BUN SERPL-MCNC: 12 MG/DL (ref 8–22)
BURR CELLS BLD QL SMEAR: NORMAL
CALCIUM SERPL-MCNC: 8.6 MG/DL (ref 8.5–10.5)
CHLORIDE SERPL-SCNC: 104 MMOL/L (ref 96–112)
CO2 SERPL-SCNC: 20 MMOL/L (ref 20–33)
CREAT SERPL-MCNC: 0.78 MG/DL (ref 0.5–1.4)
EOSINOPHIL # BLD AUTO: 0 K/UL (ref 0–0.51)
EOSINOPHIL NFR BLD: 0 % (ref 0–6.9)
ERYTHROCYTE [DISTWIDTH] IN BLOOD BY AUTOMATED COUNT: 71 FL (ref 35.9–50)
GLOBULIN SER CALC-MCNC: 2.8 G/DL (ref 1.9–3.5)
GLUCOSE BLD-MCNC: 164 MG/DL (ref 65–99)
GLUCOSE BLD-MCNC: 200 MG/DL (ref 65–99)
GLUCOSE BLD-MCNC: 208 MG/DL (ref 65–99)
GLUCOSE SERPL-MCNC: 229 MG/DL (ref 65–99)
HCT VFR BLD AUTO: 26.1 % (ref 37–47)
HCT VFR BLD AUTO: 26.2 % (ref 37–47)
HGB BLD-MCNC: 8.1 G/DL (ref 12–16)
HGB BLD-MCNC: 8.3 G/DL (ref 12–16)
HYPOCHROMIA BLD QL SMEAR: ABNORMAL
LG PLATELETS BLD QL SMEAR: NORMAL
LYMPHOCYTES # BLD AUTO: 0.81 K/UL (ref 1–4.8)
LYMPHOCYTES NFR BLD: 5.2 % (ref 22–41)
MAGNESIUM SERPL-MCNC: 1.9 MG/DL (ref 1.5–2.5)
MANUAL DIFF BLD: ABNORMAL
MCH RBC QN AUTO: 28.4 PG (ref 27–33)
MCHC RBC AUTO-ENTMCNC: 31.7 G/DL (ref 33.6–35)
MCV RBC AUTO: 89.7 FL (ref 81.4–97.8)
MICROCYTES BLD QL SMEAR: ABNORMAL
MONOCYTES # BLD AUTO: 0.95 K/UL (ref 0–0.85)
MONOCYTES NFR BLD AUTO: 6.1 % (ref 0–13.4)
MORPHOLOGY BLD-IMP: NORMAL
NEUTROPHILS # BLD AUTO: 13.84 K/UL (ref 2–7.15)
NEUTROPHILS NFR BLD: 63.5 % (ref 44–72)
NEUTS BAND NFR BLD MANUAL: 25.2 % (ref 0–10)
NRBC # BLD AUTO: 0.02 K/UL
NRBC BLD-RTO: 0.1 /100 WBC
PHOSPHATE SERPL-MCNC: 4.7 MG/DL (ref 2.5–4.5)
PLATELET # BLD AUTO: 333 K/UL (ref 164–446)
PLATELET BLD QL SMEAR: NORMAL
PMV BLD AUTO: 10.5 FL (ref 9–12.9)
POIKILOCYTOSIS BLD QL SMEAR: NORMAL
POLYCHROMASIA BLD QL SMEAR: NORMAL
POTASSIUM SERPL-SCNC: 4.4 MMOL/L (ref 3.6–5.5)
PROT SERPL-MCNC: 5.2 G/DL (ref 6–8.2)
RBC # BLD AUTO: 2.92 M/UL (ref 4.2–5.4)
RBC BLD AUTO: PRESENT
SODIUM SERPL-SCNC: 138 MMOL/L (ref 135–145)
WBC # BLD AUTO: 15.6 K/UL (ref 4.8–10.8)

## 2021-12-24 PROCEDURE — 85014 HEMATOCRIT: CPT

## 2021-12-24 PROCEDURE — A9270 NON-COVERED ITEM OR SERVICE: HCPCS | Performed by: INTERNAL MEDICINE

## 2021-12-24 PROCEDURE — 700105 HCHG RX REV CODE 258: Performed by: STUDENT IN AN ORGANIZED HEALTH CARE EDUCATION/TRAINING PROGRAM

## 2021-12-24 PROCEDURE — 700101 HCHG RX REV CODE 250: Performed by: STUDENT IN AN ORGANIZED HEALTH CARE EDUCATION/TRAINING PROGRAM

## 2021-12-24 PROCEDURE — 770001 HCHG ROOM/CARE - MED/SURG/GYN PRIV*

## 2021-12-24 PROCEDURE — 700111 HCHG RX REV CODE 636 W/ 250 OVERRIDE (IP): Performed by: HOSPITALIST

## 2021-12-24 PROCEDURE — 700105 HCHG RX REV CODE 258: Performed by: HOSPITALIST

## 2021-12-24 PROCEDURE — 700102 HCHG RX REV CODE 250 W/ 637 OVERRIDE(OP): Performed by: HOSPITALIST

## 2021-12-24 PROCEDURE — C9113 INJ PANTOPRAZOLE SODIUM, VIA: HCPCS | Performed by: STUDENT IN AN ORGANIZED HEALTH CARE EDUCATION/TRAINING PROGRAM

## 2021-12-24 PROCEDURE — 80053 COMPREHEN METABOLIC PANEL: CPT

## 2021-12-24 PROCEDURE — 94660 CPAP INITIATION&MGMT: CPT

## 2021-12-24 PROCEDURE — 99233 SBSQ HOSP IP/OBS HIGH 50: CPT | Performed by: INTERNAL MEDICINE

## 2021-12-24 PROCEDURE — 700111 HCHG RX REV CODE 636 W/ 250 OVERRIDE (IP): Performed by: STUDENT IN AN ORGANIZED HEALTH CARE EDUCATION/TRAINING PROGRAM

## 2021-12-24 PROCEDURE — 700101 HCHG RX REV CODE 250: Performed by: INTERNAL MEDICINE

## 2021-12-24 PROCEDURE — A9270 NON-COVERED ITEM OR SERVICE: HCPCS | Performed by: HOSPITALIST

## 2021-12-24 PROCEDURE — 85007 BL SMEAR W/DIFF WBC COUNT: CPT

## 2021-12-24 PROCEDURE — 700102 HCHG RX REV CODE 250 W/ 637 OVERRIDE(OP): Performed by: INTERNAL MEDICINE

## 2021-12-24 PROCEDURE — 99233 SBSQ HOSP IP/OBS HIGH 50: CPT | Performed by: HOSPITALIST

## 2021-12-24 PROCEDURE — 85018 HEMOGLOBIN: CPT

## 2021-12-24 PROCEDURE — 84100 ASSAY OF PHOSPHORUS: CPT

## 2021-12-24 PROCEDURE — 85027 COMPLETE CBC AUTOMATED: CPT

## 2021-12-24 PROCEDURE — 83735 ASSAY OF MAGNESIUM: CPT

## 2021-12-24 PROCEDURE — 92526 ORAL FUNCTION THERAPY: CPT

## 2021-12-24 PROCEDURE — 82962 GLUCOSE BLOOD TEST: CPT | Mod: 91

## 2021-12-24 RX ORDER — DULOXETIN HYDROCHLORIDE 30 MG/1
60 CAPSULE, DELAYED RELEASE ORAL
Status: DISCONTINUED | OUTPATIENT
Start: 2021-12-24 | End: 2021-12-29 | Stop reason: HOSPADM

## 2021-12-24 RX ORDER — DEXAMETHASONE 1 MG
0.5 TABLET ORAL 2 TIMES DAILY
Status: DISCONTINUED | OUTPATIENT
Start: 2021-12-24 | End: 2021-12-26

## 2021-12-24 RX ORDER — SODIUM CHLORIDE, SODIUM LACTATE, POTASSIUM CHLORIDE, AND CALCIUM CHLORIDE .6; .31; .03; .02 G/100ML; G/100ML; G/100ML; G/100ML
500 INJECTION, SOLUTION INTRAVENOUS ONCE
Status: COMPLETED | OUTPATIENT
Start: 2021-12-24 | End: 2021-12-24

## 2021-12-24 RX ADMIN — OXYCODONE HYDROCHLORIDE 10 MG: 10 TABLET ORAL at 06:07

## 2021-12-24 RX ADMIN — FLUDROCORTISONE ACETATE 0.2 MG: 0.1 TABLET ORAL at 18:09

## 2021-12-24 RX ADMIN — INSULIN HUMAN 5 UNITS: 100 INJECTION, SUSPENSION SUBCUTANEOUS at 09:27

## 2021-12-24 RX ADMIN — PANTOPRAZOLE SODIUM 40 MG: 40 INJECTION, POWDER, FOR SOLUTION INTRAVENOUS at 18:09

## 2021-12-24 RX ADMIN — GABAPENTIN 200 MG: 100 CAPSULE ORAL at 21:48

## 2021-12-24 RX ADMIN — GABAPENTIN 200 MG: 100 CAPSULE ORAL at 16:00

## 2021-12-24 RX ADMIN — INSULIN HUMAN 3 UNITS: 100 INJECTION, SOLUTION PARENTERAL at 12:16

## 2021-12-24 RX ADMIN — PANTOPRAZOLE SODIUM 40 MG: 40 INJECTION, POWDER, FOR SOLUTION INTRAVENOUS at 06:08

## 2021-12-24 RX ADMIN — INSULIN HUMAN 3 UNITS: 100 INJECTION, SOLUTION PARENTERAL at 21:42

## 2021-12-24 RX ADMIN — GABAPENTIN 200 MG: 100 CAPSULE ORAL at 06:08

## 2021-12-24 RX ADMIN — INSULIN HUMAN 3 UNITS: 100 INJECTION, SOLUTION PARENTERAL at 18:10

## 2021-12-24 RX ADMIN — SODIUM CHLORIDE, POTASSIUM CHLORIDE, SODIUM LACTATE AND CALCIUM CHLORIDE 500 ML: 600; 310; 30; 20 INJECTION, SOLUTION INTRAVENOUS at 12:20

## 2021-12-24 RX ADMIN — DEXMEDETOMIDINE 0.8 MCG/KG/HR: 200 INJECTION, SOLUTION INTRAVENOUS at 02:19

## 2021-12-24 RX ADMIN — FLUDROCORTISONE ACETATE 0.2 MG: 0.1 TABLET ORAL at 06:08

## 2021-12-24 RX ADMIN — INSULIN HUMAN 5 UNITS: 100 INJECTION, SUSPENSION SUBCUTANEOUS at 18:09

## 2021-12-24 RX ADMIN — LIDOCAINE 1 PATCH: 50 PATCH TOPICAL at 09:28

## 2021-12-24 RX ADMIN — DULOXETINE HYDROCHLORIDE 60 MG: 60 CAPSULE, DELAYED RELEASE ORAL at 21:48

## 2021-12-24 RX ADMIN — METHYLPREDNISOLONE SODIUM SUCCINATE 40 MG: 40 INJECTION, POWDER, FOR SOLUTION INTRAMUSCULAR; INTRAVENOUS at 06:08

## 2021-12-24 RX ADMIN — LIOTHYRONINE SODIUM 25 MCG: 25 TABLET ORAL at 06:08

## 2021-12-24 RX ADMIN — OXYCODONE HYDROCHLORIDE 10 MG: 10 TABLET ORAL at 02:17

## 2021-12-24 RX ADMIN — INSULIN HUMAN 4 UNITS: 100 INJECTION, SOLUTION PARENTERAL at 09:26

## 2021-12-24 RX ADMIN — LEVOTHYROXINE SODIUM 75 MCG: 0.07 TABLET ORAL at 06:08

## 2021-12-24 RX ADMIN — DEXMEDETOMIDINE 0.8 MCG/KG/HR: 200 INJECTION, SOLUTION INTRAVENOUS at 07:14

## 2021-12-24 RX ADMIN — OXYCODONE HYDROCHLORIDE 10 MG: 10 TABLET ORAL at 12:16

## 2021-12-24 RX ADMIN — DEXAMETHASONE 0.5 MG: 1 TABLET ORAL at 22:09

## 2021-12-24 RX ADMIN — HYDROCORTISONE SODIUM SUCCINATE 50 MG: 100 INJECTION, POWDER, FOR SOLUTION INTRAMUSCULAR; INTRAVENOUS at 21:48

## 2021-12-24 RX ADMIN — HYDROCORTISONE SODIUM SUCCINATE 100 MG: 100 INJECTION, POWDER, FOR SOLUTION INTRAMUSCULAR; INTRAVENOUS at 06:07

## 2021-12-24 ASSESSMENT — PAIN DESCRIPTION - PAIN TYPE
TYPE: ACUTE PAIN

## 2021-12-24 ASSESSMENT — ENCOUNTER SYMPTOMS
ABDOMINAL PAIN: 1
CHILLS: 0

## 2021-12-24 NOTE — CARE PLAN
Problem: Pain - Standard  Goal: Alleviation of pain or a reduction in pain to the patient’s comfort goal  Outcome: Progressing  Note: Managed with medications     Problem: Knowledge Deficit - Standard  Goal: Patient and family/care givers will demonstrate understanding of plan of care, disease process/condition, diagnostic tests and medications  Outcome: Progressing  Note: Updated on POC

## 2021-12-24 NOTE — ASSESSMENT & PLAN NOTE
Likely reactive  Procalcitonin mildly elevated patient with improving oxygen requirements and no other clinical signs of pneumonia  Continue close clinical monitoring off antibiotics for now

## 2021-12-24 NOTE — THERAPY
Missed Therapy     Patient Name: Donna Isaac  Age:  56 y.o., Sex:  female  Medical Record #: [id number]  Today's Date: 12/24/2021    Discussed missed therapy with RN.    Attempted to see pt for dysphagia tx following change in status (GIB w/ splenic artery stent placement). However, RN is reporting is still groggy from medication, requesting SLP round back in the afternoon when pt will hopefully be more alert. SLP will round back as able/appropriate.       12/24/21 0978   Treatment Variance   Reason For Missed Therapy Medical - Patient Unarousable   Total Time Spent   Total Time Spent (Mins) 2

## 2021-12-24 NOTE — THERAPY
"Speech Language Pathology  Daily Treatment     Patient Name: Donna Isaac  Age:  56 y.o., Sex:  female  Medical Record #: [id number]  Today's Date: 12/24/2021     Precautions  Precautions: (P) Fall Risk,Swallow Precautions ( See Comments)  Comments: cardiac arrest in hospital    Assessment    Patient was seen for dysphagia tx following splenic artery stent placement 12/23. Pt was tolerating a regular/thin diet prior to internal bleed and was placed on clear liquid diet s/p sx. Pt was awake but lethargic, oriented x3, weak and unable to feed self. Vocal quality was severely hoarse and aphonic at times. Oral [facility] exam repeated with no focal deficits identified. Pt accepted thins via straw, Jello and soft/bite sized solids, demo'ing adequate mastication, timely pharyngeal swallow response. No s/sx of aspiration occurred w/ PO intake. Pt only consumed several bites before declining further PO, stating she was full and too tired to continue. Diet advancement and 1:1 feeding is indicated.    Recommendations:  Upgrade diet to Soft & Bite Sized solids (SB6) and Thin Liquids (TN0)    -1:1 feeding   -Upright for all PO intake, small bites/sips, slow rate   -Pills whole w/ thins   -Hold PO with any change in status   -Oral care after meals    Plan    Continue current treatment plan.    Discharge Recommendations: (P) Other (TBD closer to discharge as pt medically stablilizes)    Objective       12/24/21 1425   Dysphagia    Positioning / Behavior Modification Modulate Rate or Bite Size   Other Treatments tx trials SB6, TN0   Diet / Liquid Recommendation Soft & Bite-Sized (6) - (Dysphagia III);Thin (0)   Recommended Route of Medication Administration   Medication Administration  Whole with Liquid Wash   Patient / Family Goals   Patient / Family Goal #1 \"I'm full.\"   Goal #1 Outcome Progressing as expected   Short Term Goals   Short Term Goal # 2 12/17: Patient will consume meals of regular solids and thin liquids with no " s/sx of aspiration given monitoring during meals.   Goal Outcome # 2  Goal not met

## 2021-12-24 NOTE — PROGRESS NOTES
[facility] Medicine Daily Progress Note    Date of Service  12/24/2021    Chief Complaint  Donna Isaac is a 56 y.o. female admitted 12/1/2021 with abd/chest pain    Hospital Course  Ms Isaac has a complicated past medical history that includes traumatic brain injury, congestive heart failure and Valentín's disease.  She presented the emergency room on 12/1/2021 with complaints of abdominal pain.  Patient was found to have a pulmonary embolism and acute pancreatitis.  Patient started on heparin drip and admitted to the hospital she subsequently developed shock,  she was started on pressors and orders were changed to admit to the ICU.  While waiting for transfer the patient had cardiac arrest in the emergency room, CPR was per formed, she was intubated, she was resuscitated with massive transfusion protocol.  Patient has been supported with ventilator, and IVC filter has been placed 12/6 , her renal function has improved and she was extubated on 12/13/2021.  The patient diagnosed with left upper venous thrombosis including subclavian vein, left upper extremity venous thrombosis axillary vein, as well as acute deep vein thrombosis in the left posterior tibial vein.  On 12/23/2021 the patient had severe left upper quadrant abdominal pain as well as shock and was taken urgently to IR for stenting of the bleeding splenic artery aneurysm      Interval Problem Update    Patient was on Precedex overnight for confusion and agitation she has been weaned off Precedex this morning  She is alert and oriented x3 on my evaluation  She complains of mild to moderate abdominal pain  She is afebrile this morning        I have personally seen and examined the patient at bedside. I discussed the plan of care with patient, bedside RN and pharmacy.    Consultants/Specialty  general surgery and nephrology  Critical care  Code Status  DNAR/DNI    Disposition  Patient is not medically cleared.   Anticipate discharge to to an  inpatient rehabilitation hospital./[facility]  I have placed the appropriate orders for post-discharge needs.    Review of Systems  Review of Systems   Constitutional: Positive for malaise/fatigue. Negative for chills.   Cardiovascular: Positive for leg swelling.   Gastrointestinal: Positive for abdominal pain.   Musculoskeletal: Positive for joint pain.   All other systems reviewed and are negative.       Physical Exam  Temp:  [36.4 °C (97.5 °F)-37.7 °C (99.9 °F)] 36.6 °C (97.9 °F)  Pulse:  [] 111  Resp:  [16-47] 27  BP: ()/(59-93) 102/73  SpO2:  [93 %-100 %] 93 %    Physical Exam  Vitals and nursing note reviewed.   Constitutional:       General: She is not in acute distress.  HENT:      Head: Normocephalic and atraumatic.      Nose: Nose normal. No rhinorrhea.      Mouth/Throat:      Pharynx: No oropharyngeal exudate or posterior oropharyngeal erythema.   Eyes:      General: No scleral icterus.        Right eye: No discharge.         Left eye: No discharge.   Cardiovascular:      Rate and Rhythm: Normal rate and regular rhythm.      Heart sounds: Normal heart sounds. No murmur heard.  No friction rub. No gallop.    Pulmonary:      Effort: Pulmonary effort is normal. No respiratory distress.      Breath sounds: No stridor. Decreased breath sounds present. No wheezing or rhonchi.   Chest:      Chest wall: No tenderness.   Abdominal:      General: Bowel sounds are normal. There is distension.      Palpations: Abdomen is soft. There is no mass.      Tenderness: There is abdominal tenderness. There is no rebound.   Musculoskeletal:         General: Swelling present. No tenderness.      Cervical back: Neck supple. No rigidity.   Skin:     General: Skin is warm and dry.      Coloration: Skin is not cyanotic or jaundiced.      Nails: There is no clubbing.   Neurological:      General: No focal deficit present.      Mental Status: She is alert and oriented to person, place, and time.      Cranial Nerves: No cranial  nerve deficit.      Motor: Weakness (Generalized) present.   Psychiatric:         Mood and Affect: Mood normal.         Behavior: Behavior normal.         Fluids    Intake/Output Summary (Last 24 hours) at 12/24/2021 1539  Last data filed at 12/24/2021 0956  Gross per 24 hour   Intake 886.41 ml   Output 445 ml   Net 441.41 ml       Laboratory  Recent Labs     12/23/21  1002 12/23/21  1002 12/23/21  1520 12/23/21  1520 12/23/21  1939 12/24/21  0230 12/24/21  0916   WBC 11.9*  --  14.3*  --   --  15.6*  --    RBC 2.90*  --  3.37*  --   --  2.92*  --    HEMOGLOBIN 7.9*   < > 9.6*   < > 9.1* 8.3* 8.1*   HEMATOCRIT 27.3*   < > 30.5*   < > 29.7* 26.2* 26.1*   MCV 94.1  --  90.5  --   --  89.7  --    MCH 27.2  --  28.5  --   --  28.4  --    MCHC 28.9*  --  31.5*  --   --  31.7*  --    RDW 77.6*  --  68.4*  --   --  71.0*  --    PLATELETCT 375  --  319  --   --  333  --    MPV 10.4  --  11.7  --   --  10.5  --     < > = values in this interval not displayed.     Recent Labs     12/23/21  0430 12/23/21  1002 12/24/21  0230   SODIUM 141 140 138   POTASSIUM 3.8 3.8 4.4   CHLORIDE 108 107 104   CO2 25 23 20   GLUCOSE 130* 132* 229*   BUN 7* 8 12   CREATININE 0.58 0.61 0.78   CALCIUM 7.8* 7.6* 8.6     Recent Labs     12/23/21  1002   APTT 40.3*   INR 1.32*               Imaging  IR-EMBOLIZATION   Final Result      Small pseudoaneurysm with bleeding arising from the splenic artery, successfully treated with covered stent               CT-CHEST,ABDOMEN,PELVIS WITH   Final Result      1.  Tiny focal contrast blush along the inferior margin of the dominant hematoma.   2.  A vessel cutoff along the posterior margin of the smaller hematoma, with a small adjacent 5 mm arterial aneurysm versus venous phlebolith. Either one of these may be the source of patient's bleeding.   3.  Hemoperitoneum.   4.  Stable moderate left effusion with associated atelectasis. Trace right effusion.   5.  Stable scattered groundglass opacities in the upper  lobes, likely infectious/inflammatory. Active pneumonia should be considered.   6.  A 4 cm ascending aortic aneurysm.   7.  Findings of anemia.      CT-CHEST,ABDOMEN,PELVIS W/O   Final Result      1.  Large, 15 cm hematoma between the greater curvature of the excluded stomach and transverse colon. It is new since prior study.   2.  A smaller, 5.6 cm hematoma posterior to the gastric fundus, also new, possibly in the gastric fundus wall or at the bypass surgical staple line.   3.  Worsening hemoperitoneum.   4.  Slightly hyperdense moderate left effusion with associated atelectasis. Trace right effusion.   5.  Scattered groundglass opacities in the upper lobes, new since prior study, likely infectious/inflammatory. Active pneumonia should be considered.   6.  A 4 cm ascending aortic aneurysm.   7.  Findings of anemia.      Findings were discussed with Dr. Mirza on 12/23/2021 12:12 PM.      DX-FOOT-2- LEFT   Final Result      Soft tissue swelling without acute osseous abnormality.      DX-CHEST-PORTABLE (1 VIEW)   Final Result         1.  Mild edema and/or infiltrate, particularly on the right, stable since prior study   2.  Cardiomegaly      DX-CHEST-PORTABLE (1 VIEW)   Final Result         1.  Mild edema and/or infiltrate, particularly on the right   2.  Cardiomegaly      DX-CHEST-PORTABLE (1 VIEW)   Final Result         1.  Bibasilar atelectasis or early infiltrate   2.  Cardiomegaly      DX-CHEST-PORTABLE (1 VIEW)   Final Result      1.  Bibasilar underinflation atelectasis which could obscure an additional process. This is unchanged.   2.  Suspect small LEFT pleural effusion      DX-CHEST-PORTABLE (1 VIEW)   Final Result         1.  Bilateral basilar atelectasis, no focal infiltrate   2.  Cardiomegaly      DX-CHEST-PORTABLE (1 VIEW)   Final Result         1.  Bilateral basilar atelectasis, no focal infiltrate   2.  Cardiomegaly      DX-CHEST-PORTABLE (1 VIEW)   Final Result         1.  Left lower lobe atelectasis  or infiltrates, similar compared to prior study.   2.  Cardiomegaly      DX-CHEST-PORTABLE (1 VIEW)   Final Result         1.  Left lower lobe atelectasis or infiltrates, similar compared to prior study.   2.  Cardiomegaly      DX-CHEST-PORTABLE (1 VIEW)   Final Result         1.  Left lower lobe atelectasis or infiltrates, similar compared to prior study.   2.  Cardiomegaly      IR-INSERT IVC FILTER WITH IG & SI   Final Result      Ultrasound and fluoroscopic guided placement of an Option Elite IVC filter.      DX-CHEST-PORTABLE (1 VIEW)   Final Result         1.  Pulmonary edema and/or infiltrates are identified, which are stable since the prior exam.   2.  Cardiomegaly      DX-CHEST-PORTABLE (1 VIEW)   Final Result      Tubes and lines as described above.      Bibasilar atelectasis with small pleural effusions.      DX-CHEST-PORTABLE (1 VIEW)   Final Result         1. No significant interval change.      [id number] VIEW   Final Result      Cortrak feeding tube tip projects in the region of the mid stomach.      DX-CHEST-PORTABLE (1 VIEW)   Final Result      Placement of right IJ dialysis catheter.      Otherwise stable findings.      DX-CHEST-PORTABLE (1 VIEW)   Final Result         1.  Pulmonary edema and/or infiltrates are identified, which are stable since the prior exam.   2.  Cardiomegaly      DX-CHEST-PORTABLE (1 VIEW)   Final Result         1.  Pulmonary edema and/or infiltrates are identified, which are stable since the prior exam.   2.  Cardiomegaly      DX-CHEST-FOR LINE PLACEMENT Perform procedure in: Patient's Room   Final Result      Interval placement of a left IJ central venous catheter with the tip overlying the right atrium.      US-EXTREMITY VENOUS LOWER BILAT   Final Result      CTA ABDOMEN PELVIS W & W/O POST PROCESS   Final Result      1.  Small to moderate amount of hemoperitoneum in the abdomen and pelvis. No active extravasation is identified.   2.  No aortic aneurysm or dissection.   3.   Peripancreatic stranding can be related to pancreatitis.   4.  Cardiomegaly.   5.  Hepatic steatosis.   6.  Status post cholecystectomy.   7.  Atrophic kidneys bilaterally.   8.  Status post gastric bypass surgery.   9.  Mild wall thickening of the second portion of the duodenum with surrounding inflammation. This can be seen in duodenitis/peptic ulcer disease.   10.  Colonic diverticulosis.   11.  Bibasilar atelectasis/consolidation. Groundglass opacities with septal thickening at the lung bases can be seen in the setting of edema or multifocal pneumonia.   12.  Bilateral anterior rib fractures.      Findings discussed with Dr. Rush.      DX-CHEST-PORTABLE (1 VIEW)   Final Result      1.  Endotracheal tube present.      2.  Cardiomegaly with interstitial prominence.      EC-ECHOCARDIOGRAM COMPLETE W/ CONT   Final Result      CT-CTA CHEST PULMONARY ARTERY W/ RECONS   Final Result      1.  Positive for pulmonary embolism located in multiple segmental and subsegmental branches      2.  Minimal elevation of RV/LV ratio      3.  Mild atelectasis      4.  Left PICC line present and appears appropriately located      5.  Findings were discussed with BLAINE VEGA on 12/1/2021 5:35 PM.                  CT-ABDOMEN-PELVIS WITH   Final Result      1.  Apparent inflammation of the pancreas with surrounding fat stranding and fluid suggesting pancreatitis. Recommend correlation with lipase.      2.  Diverticulosis without diverticulitis.      3.  Hepatic steatosis.      US-EXTREMITY VENOUS UPPER UNILAT LEFT   Final Result      DX-CHEST-PORTABLE (1 VIEW)   Final Result      1.  No acute cardiopulmonary abnormality identified.      2.  Left PICC line appears appropriately located           Assessment/Plan  * Splenic artery aneurysm (HCC)  Assessment & Plan  With hemorrhagic shock status post stenting on 12/23/2021  If hemoglobin remains stable may need aspirin and Plavix for stent patency per IR      Leukocytosis  Assessment  & Plan  Likely reactive  Procalcitonin mildly elevated patient with improving oxygen requirements and no other clinical signs of pneumonia  Continue close clinical monitoring off antibiotics for now    Hemorrhagic shock ([facility])  Assessment & Plan  shock resolved  Continue close clinical monitoring    Pulmonary hypertension (HCC)- (present on admission)  Assessment & Plan  ROSSI, Hx PE  Continue oxygen  If Blood pressure remains stable resume Lasix in a.m.    Paroxysmal atrial fibrillation ([facility] [place])- (present on admission)  Assessment & Plan  Currently in sinus  Anticoagulation contraindicated at this time given recurrent intra-abdominal bleeding    Hemoperitoneum- (present on admission)  Assessment & Plan  In setting of acute pancreatitis in a pt on full dose anticoagulation  Worsened with new bleeding episode    DVT (deep venous thrombosis) ([facility] [place])  Assessment & Plan  Upper extremity as well as lower extremity  IVC in place  Anticoagulation contraindicated at this time given recurrent severe bleed    Cardiac arrest ([facility])- (present on admission)  Assessment & Plan  Secondary to hemorrhagic shock    Shock ([facility])- (present on admission)  Assessment & Plan  Hemorrhagic from intra-abd bleed    Resolved    Acute pancreatitis- (present on admission)  Assessment & Plan  resolved    PE (pulmonary thromboembolism) ([facility])- (present on admission)  Assessment & Plan  Discussed with Dr. Woodall from pulmonary critical care, given recurrent bleeding anticoagulation discontinued and is contraindicated at this time  Patient underwent IVC placement    Acute blood loss anemia- (present on admission)  Assessment & Plan  Status post stenting of a splenic artery pseudoaneurysm    Hemoglobin stable overnight continue to monitor    Adrenal insufficiency ([place]'s disease) ([facility] [place])- (present on admission)  Assessment & Plan  On home regimen per her endocrinologist: hydrocortisone, dexamthasone, florinef    Start tapering stress dose hydrocortisone    MRSA  and E. coli sinusitis- (present on admission)  Assessment & Plan  Completed 6 weeks of IV ceftriaxone and vancomycin on 12/21/2021    Hypothyroidism- (present on admission)  Assessment & Plan  Continue thyroid replacement therapy    Acute respiratory failure with hypoxia (HCC)- (present on admission)  Assessment & Plan  Intubated due to hemorrhagic shock  Extubated 12/13    RT protocol  Aspiration precautions       VTE prophylaxis: SCD    I have performed a physical exam and reviewed and updated ROS and Plan today (12/24/2021). In review of yesterday's note (12/23/2021), there are no changes except as documented above.

## 2021-12-24 NOTE — PROGRESS NOTES
This RN witnessed Dr. Mirza's order to Ezequiel, primary RN for a dexmedetomidine gtt. Orders placed by this RN to help out primary RN.

## 2021-12-24 NOTE — OR SURGEON
Immediate Post- Operative Note        Findings: splenic artery pseudoaneurysm with bleeding      Procedure(s): arteriogram SMA and celiac and splenic  6 x 38 mm atrium covered stent in splenic artery      Estimated Blood Loss: Less than 5 ml        Complications: None            12/23/2021     4:28 PM     Teodoro Chan M.D.

## 2021-12-24 NOTE — CARE PLAN
The patient is Watcher - Medium risk of patient condition declining or worsening    Shift Goals  Clinical Goals: pain management   Patient Goals: comfort  Family Goals: no family present    Progress made toward(s) clinical / shift goals:  Patient no longer on IV norepinephrine, given 1 transfusion with improvement in hgb and hct of 2 points. Bleeding located and patient taken to IR to control    Patient is not progressing towards the following goals:      Problem: Pain - Standard  Goal: Alleviation of pain or a reduction in pain to the patient’s comfort goal  Outcome: Not Progressing  Note: Significant pain today, unable to be controlled despite escalation of medicine, hoping to improve post IR stenting of splenic artery

## 2021-12-24 NOTE — PROGRESS NOTES
Critical Care Progress Note    Date of admission  12/1/2021    Chief Complaint  56 y.o. female, PMH Donnelly's disease, CHF, TBI, morbid obesity admitted 12/1/2021 with acute pancreatitis and PE complicated by hemorrhage shock, cardiac arrest, MTP, IVC filter placed and anticoagulation resumed, transferred to ICU emergently 12/23 with bleeding splenic artery pseudoaneurysm, emergent IR arteriogram with covered stent placed in splenic artery    Hospital Course  12/24-clinically improving, A/O, mildly lethargic, off sedation, abdominal pain improved, 2 LPM oxygen      Interval Problem Update  Reviewed last 24 hour events:  1 U PRBC  Hgb 8.1  A/o, lethargic; dex held  Oxy prn abdominal pain  SR  SBP 90 - 120  Afebrile  2 lpm   Not tal nocturnal bipap  I/O = 2.2/725  PICC in place  Florinef, IV solucortef  Off anticoagulation   PPI  High SSI, 5 NPH  Transfer to tele    Review of Systems  Review of Systems   Unable to perform ROS: Acuity of condition        Vital Signs for last 24 hours   Temp:  [36.4 °C (97.5 °F)-38.7 °C (101.7 °F)] 36.4 °C (97.5 °F)  Pulse:  [] 73  Resp:  [16-47] 21  BP: ()/(58-93) 102/73  SpO2:  [94 %-100 %] 95 %    Hemodynamic parameters for last 24 hours       Respiratory Information for the last 24 hours       Physical Exam   Physical Exam  Vitals and nursing note reviewed.   Constitutional:       Comments: Obese, pale, somnolent but not in distress   HENT:      Head: Normocephalic.      Nose: Nose normal.      Mouth/Throat:      Mouth: Mucous membranes are moist.   Eyes:      Pupils: Pupils are equal, round, and reactive to light.   Cardiovascular:      Rate and Rhythm: Regular rhythm.   Pulmonary:      Effort: Pulmonary effort is normal. No respiratory distress.      Comments: Diminished breath sounds, no wheeze  Abdominal:      General: There is distension.      Tenderness: There is abdominal tenderness. There is no guarding.   Musculoskeletal:         General: Swelling present. No  deformity.      Cervical back: Neck supple.   Skin:     General: Skin is warm and dry.      Capillary Refill: Capillary refill takes less than 2 seconds.   Neurological:      General: No focal deficit present.      Comments: Moderately weak throughout         Medications  Current Facility-Administered Medications   Medication Dose Route Frequency Provider Last Rate Last Admin   • hydrocortisone sodium succinate PF (Solu-CORTEF) 100 MG injection 100 mg  100 mg Intravenous Q8HRS Maurilio Phoenix M.D.   100 mg at 12/24/21 0607   • HYDROmorphone (Dilaudid) injection 0.5-1 mg  0.5-1 mg Intravenous Q2HRS PRN Martha Mirza M.D.   0.5 mg at 12/23/21 2346   • pantoprazole (Protonix) injection 40 mg  40 mg Intravenous BID Emanuel Mcdonald M.D.   40 mg at 12/24/21 0608   • fentaNYL (SUBLIMAZE) injection 50 mcg  50 mcg Intravenous Q HOUR PRN Martha Mirza M.D.       • HYDROmorphone (Dilaudid) injection 1 mg  1 mg Intravenous Once Martha Mirza M.D.       • dexmedetomidine (PRECEDEX) 400 mcg/100mL NS premix infusion  0.1-1.5 mcg/kg/hr Intravenous Continuous Martha Mirza M.D. 20.6 mL/hr at 12/24/21 0714 0.8 mcg/kg/hr at 12/24/21 0714   • fludrocortisone (FLORINEF) tablet 0.2 mg  0.2 mg Oral BID Alber Guerra M.D.   0.2 mg at 12/24/21 0608   • [Held by provider] enoxaparin (LOVENOX) inj 100 mg  100 mg Subcutaneous Q12HRS Alber Junior M.D.   100 mg at 12/23/21 0507   • insulin NPH (HumuLIN N,NovoLIN N) injection  5 Units Subcutaneous BID Alber Junior M.D.   5 Units at 12/23/21 2028   • lidocaine (LIDODERM) 5 % 1 Patch  1 Patch Transdermal Q24HR Alber Junior M.D.   1 Patch at 12/22/21 0933   • insulin regular (HumuLIN R,NovoLIN R) injection  3-14 Units Subcutaneous 4X/DAY ACHS Alber Junior M.D.   7 Units at 12/22/21 1105    And   • dextrose 50% (D50W) injection 50 mL  50 mL Intravenous Q15 MIN PRN Alber Junior M.D.       • gabapentin (NEURONTIN) capsule 200 mg  200 mg Oral Q8HRS  Alber Junior M.D.   200 mg at 12/24/21 0608   • levothyroxine (SYNTHROID) tablet 75 mcg  75 mcg Oral AM ES Alber Junior M.D.   75 mcg at 12/24/21 0608   • liothyronine (CYTOMEL) tablet 25 mcg  25 mcg Oral QAM AC Alber Junior M.D.   25 mcg at 12/24/21 0608   • acetaminophen (Tylenol) tablet 650 mg  650 mg Oral Q4HRS PRN Alber Junior M.D.   650 mg at 12/21/21 1732   • ondansetron (ZOFRAN ODT) dispertab 4 mg  4 mg Oral Q4HRS PRN Alber Junior M.D.       • oxyCODONE immediate-release (ROXICODONE) tablet 5 mg  5 mg Oral Q4HRS PRN Alber Junior M.D.   5 mg at 12/22/21 2322    Or   • oxyCODONE immediate release (ROXICODONE) tablet 10 mg  10 mg Oral Q4HRS PRN Alber Junior M.D.   10 mg at 12/24/21 0607   • melatonin tablet 5 mg  5 mg Oral HS PRN - MR X 1 Alison Angulo, KONSTANTINPJelaniRJelaniN.   5 mg at 12/20/21 2118   • hydrALAZINE (APRESOLINE) injection 20 mg  20 mg Intravenous Q4HRS PRN Eligio Del Castillo M.D.   20 mg at 12/09/21 0115   • labetalol (NORMODYNE/TRANDATE) injection 10-20 mg  10-20 mg Intravenous Q4HRS PRN Eligio Del Castillo M.D.   10 mg at 12/18/21 0407   • Respiratory Therapy Consult   Nebulization Continuous RT Servando Rush M.D.       • Pharmacy Consult Request ...Pain Management Review 1 Each  1 Each Other PHARMACY TO DOSE David Yoder M.D.       • ondansetron (ZOFRAN) syringe/vial injection 4 mg  4 mg Intravenous Q4HRS PRN David Yoder M.D.   4 mg at 12/11/21 2314   • promethazine (PHENERGAN) suppository 12.5-25 mg  12.5-25 mg Rectal Q4HRS PRN David Yoder M.D.           Fluids    Intake/Output Summary (Last 24 hours) at 12/24/2021 0911  Last data filed at 12/24/2021 0600  Gross per 24 hour   Intake 2262.69 ml   Output 725 ml   Net 1537.69 ml       Laboratory  Recent Labs     12/23/21  0948 12/23/21  1457 12/23/21  1501   ISTATAPH 7.479 7.461 7.465   ISTATAPCO2 34.3 34.2 33.2   ISTATAPO2 98* 64 62*   ISTATATCO2 27 25 25   [id number] 98 93 93   ISTATARTHCO3  25.5* 24.4 23.9   ISTATARTBE 2 1 1   ISTATTEMP 98.0 F 38.0 C 38.0 C   ISTATSPEC Arterial Arterial Arterial   ISTATAPHTC 7.484 7.446 7.450   [id number] 96* 68 67         Recent Labs     12/22/21  0544 12/22/21  0544 12/23/21  0430 12/23/21 1002 12/24/21  0230   SODIUM 140   < > 141 140 138   POTASSIUM 3.2*   < > 3.8 3.8 4.4   CHLORIDE 105   < > 108 107 104   CO2 23   < > 25 23 20   BUN 7*   < > 7* 8 12   CREATININE 0.50   < > 0.58 0.61 0.78   MAGNESIUM 1.3*  --  1.7  --  1.9   PHOSPHORUS 3.1  --  3.1  --  4.7*   CALCIUM 7.9*   < > 7.8* 7.6* 8.6    < > = values in this interval not displayed.     Recent Labs     12/22/21  0544 12/22/21  0544 12/23/21  0430 12/23/21  1002 12/24/21  0230   ALTSGPT 29  --  24  --  22   ASTSGOT 18  --  16  --  24   ALKPHOSPHAT 159*  --  146*  --  136*   TBILIRUBIN 0.3  --  0.3  --  0.6   GLUCOSE 198*   < > 130* 132* 229*    < > = values in this interval not displayed.     Recent Labs     12/22/21  0544 12/22/21  0544 12/23/21  0430 12/23/21  0430 12/23/21  1002 12/23/21  1520 12/24/21  0230   WBC 6.9   < > 9.8   < > 11.9* 14.3* 15.6*   NEUTSPOLYS 72.80*   < > 84.30*  --  91.40*  --  63.50   LYMPHOCYTES 14.50*   < > 8.50*  --  4.30*  --  5.20*   MONOCYTES 10.00   < > 5.80  --  1.70  --  6.10   EOSINOPHILS 1.00   < > 0.50  --  1.70  --  0.00   BASOPHILS 0.30   < > 0.10  --  0.00  --  0.00   ASTSGOT 18  --  16  --   --   --  24   ALTSGPT 29  --  24  --   --   --  22   ALKPHOSPHAT 159*  --  146*  --   --   --  136*   TBILIRUBIN 0.3  --  0.3  --   --   --  0.6    < > = values in this interval not displayed.     Recent Labs     12/22/21  0544 12/23/21  0430 12/23/21  1002 12/23/21  1002 12/23/21  1520 12/23/21  1939 12/24/21  0230   RBC 3.20*   < > 2.90*  --  3.37*  --  2.92*   HEMOGLOBIN 8.5*   < > 7.9*   < > 9.6* 9.1* 8.3*   HEMATOCRIT 29.7*   < > 27.3*   < > 30.5* 29.7* 26.2*   PLATELETCT 329   < > 375  --  319  --  333   PROTHROMBTM  --   --  16.0*  --   --   --   --    APTT  --   --   40.3*  --   --   --   --    INR  --   --  1.32*  --   --   --   --    IRON 46  --   --   --   --   --   --    FERRITIN 535.0*  --   --   --   --   --   --    TOTIRONBC 208*  --   --   --   --   --   --     < > = values in this interval not displayed.       Imaging  X-Ray:  I have personally reviewed the images and compared with prior images.  CT:    Reviewed   CT C/A/P 12/23  1.  Tiny focal contrast blush along the inferior margin of the dominant hematoma.  2.  A vessel cutoff along the posterior margin of the smaller hematoma, with a small adjacent 5 mm arterial aneurysm versus venous phlebolith. Either one of these may be the source of patient's bleeding.  3.  Hemoperitoneum.  4.  Stable moderate left effusion with associated atelectasis. Trace right effusion.  5.  Stable scattered groundglass opacities in the upper lobes, likely infectious/inflammatory. Active pneumonia should be considered.  6.  A 4 cm ascending aortic aneurysm.  7.  Findings of anemia.    Assessment/Plan  * Hemoperitoneum- (present on admission)  Assessment & Plan  S/p splenic art stent 12/23  No further anticoagulation - bleeding risk too high  OK to start antiplatelet therapy for stent in am    Pulmonary hypertension (HCC)- (present on admission)  Assessment & Plan  RVSP 50 on TTE this admission     Paroxysmal atrial fibrillation (HCC)- (present on admission)  Assessment & Plan  Rate controlled, SR currently  No AC     DVT (deep venous thrombosis) ([facility])  Assessment & Plan  Imaging reveals LUE DVT associated with her PICC line and a distal LLE DVT - 12/1 & 12/2 studies  Diagnosed on 12/2 s/p IVC filter placement on 12/6 (AC was C/I due to intra-abdominal bleeding, shock)  No AC as above    Cardiac arrest (HCC)- (present on admission)  Assessment & Plan  S/p PEA, asystole and ventricular tachycardia in ED on 12/2 secondary to hemorrhagic shock (ROSC after 7 rounds of CPR, IV epinephrine, IV bicarbonate solution and massive transfusion  protocol)  Reviewed CODE STATUS with patient and her , POLST completed by palliative care, DANR/DNI    Acute pancreatitis- (present on admission)  Assessment & Plan  Resolved   Tolerating diet and having BMs    PE (pulmonary thromboembolism) (HCC)- (present on admission)  Assessment & Plan  Diagnosed on 12/2 s/p IVC filter placement on 12/6 (AC was C/I due to intra-abdominal bleeding, shock)  Recommend against further anticoagulation   H&H stable; Monitor and transfuse to keep Hb>7    Adrenal insufficiency (Valentín's disease) (HCC)- (present on admission)  Assessment & Plan  Continue hydrocortisone  Continue Florinef, 0.1 mg twice daily    MRSA and E. coli sinusitis- (present on admission)  Assessment & Plan  S/p 6 weeks of IV antibiotics, vancomycin and ceftriaxone (per ID- completed on 12/21)     Hypothyroidism- (present on admission)  Assessment & Plan  Continue levothyroxine and liothyronine  Most recent TSH borderline low, follow-up with endocrinology-she sees Dr. Jeff    Acute respiratory failure with hypoxia (HCC)- (present on admission)  Assessment & Plan  Resolved  Intubated 12/2-extubated 12/12  RT protocols/O2 as needed  Monitor L effusion; may have been some mild aspiration, doubt bacterial pna     Discussed with hospitalist.  Okay to transfer out of ICU to telemetry today.  Critical care will sign off for now.  Please call if needed.

## 2021-12-24 NOTE — CARE PLAN
Problem: Hyperinflation  Goal: Prevent or improve atelectasis  Description: 1. Instruct incentive spirometry usage  2.  Perform hyperinflation therapy as indicated  Outcome: Not Progressing   PEP QID  Patient refuses to participated. NOC BiPAP attempted, Patient repeatedly removes mask.

## 2021-12-25 ENCOUNTER — APPOINTMENT (OUTPATIENT)
Dept: RADIOLOGY | Facility: MEDICAL CENTER | Age: 56
DRG: 981 | End: 2021-12-25
Attending: STUDENT IN AN ORGANIZED HEALTH CARE EDUCATION/TRAINING PROGRAM
Payer: MEDICARE

## 2021-12-25 ENCOUNTER — APPOINTMENT (OUTPATIENT)
Dept: RADIOLOGY | Facility: MEDICAL CENTER | Age: 56
DRG: 981 | End: 2021-12-25
Attending: HOSPITALIST
Payer: MEDICARE

## 2021-12-25 LAB
ALBUMIN SERPL BCP-MCNC: 2.2 G/DL (ref 3.2–4.9)
ALBUMIN/GLOB SERPL: 0.8 G/DL
ALP SERPL-CCNC: 119 U/L (ref 30–99)
ALT SERPL-CCNC: 20 U/L (ref 2–50)
ANION GAP SERPL CALC-SCNC: 11 MMOL/L (ref 7–16)
ANISOCYTOSIS BLD QL SMEAR: ABNORMAL
AST SERPL-CCNC: 26 U/L (ref 12–45)
BARCODED ABORH UBTYP: 5100
BARCODED ABORH UBTYP: 5100
BARCODED PRD CODE UBPRD: NORMAL
BARCODED PRD CODE UBPRD: NORMAL
BARCODED UNIT NUM UBUNT: NORMAL
BARCODED UNIT NUM UBUNT: NORMAL
BASOPHILS # BLD AUTO: 0 % (ref 0–1.8)
BASOPHILS # BLD: 0 K/UL (ref 0–0.12)
BILIRUB SERPL-MCNC: 0.7 MG/DL (ref 0.1–1.5)
BUN SERPL-MCNC: 23 MG/DL (ref 8–22)
BURR CELLS BLD QL SMEAR: NORMAL
CALCIUM SERPL-MCNC: 8.7 MG/DL (ref 8.5–10.5)
CHLORIDE SERPL-SCNC: 105 MMOL/L (ref 96–112)
CO2 SERPL-SCNC: 22 MMOL/L (ref 20–33)
COMPONENT R 8504R: NORMAL
COMPONENT R 8504R: NORMAL
CREAT SERPL-MCNC: 1.25 MG/DL (ref 0.5–1.4)
DOHLE BOD BLD QL SMEAR: NORMAL
EKG IMPRESSION: NORMAL
EOSINOPHIL # BLD AUTO: 0 K/UL (ref 0–0.51)
EOSINOPHIL NFR BLD: 0 % (ref 0–6.9)
ERYTHROCYTE [DISTWIDTH] IN BLOOD BY AUTOMATED COUNT: 66.1 FL (ref 35.9–50)
ERYTHROCYTE [DISTWIDTH] IN BLOOD BY AUTOMATED COUNT: 73.9 FL (ref 35.9–50)
GLOBULIN SER CALC-MCNC: 2.9 G/DL (ref 1.9–3.5)
GLUCOSE BLD-MCNC: 122 MG/DL (ref 65–99)
GLUCOSE BLD-MCNC: 147 MG/DL (ref 65–99)
GLUCOSE BLD-MCNC: 153 MG/DL (ref 65–99)
GLUCOSE BLD-MCNC: 161 MG/DL (ref 65–99)
GLUCOSE BLD-MCNC: 181 MG/DL (ref 65–99)
GLUCOSE SERPL-MCNC: 144 MG/DL (ref 65–99)
HCT VFR BLD AUTO: 21.9 % (ref 37–47)
HCT VFR BLD AUTO: 26.6 % (ref 37–47)
HGB BLD-MCNC: 6.8 G/DL (ref 12–16)
HGB BLD-MCNC: 8.4 G/DL (ref 12–16)
HYPOCHROMIA BLD QL SMEAR: ABNORMAL
LACTATE BLD-SCNC: 1.8 MMOL/L (ref 0.5–2)
LYMPHOCYTES # BLD AUTO: 0.17 K/UL (ref 1–4.8)
LYMPHOCYTES NFR BLD: 1.8 % (ref 22–41)
MACROCYTES BLD QL SMEAR: ABNORMAL
MAGNESIUM SERPL-MCNC: 1.9 MG/DL (ref 1.5–2.5)
MANUAL DIFF BLD: NORMAL
MCH RBC QN AUTO: 27.8 PG (ref 27–33)
MCH RBC QN AUTO: 28.3 PG (ref 27–33)
MCHC RBC AUTO-ENTMCNC: 31.1 G/DL (ref 33.6–35)
MCHC RBC AUTO-ENTMCNC: 31.6 G/DL (ref 33.6–35)
MCV RBC AUTO: 88.1 FL (ref 81.4–97.8)
MCV RBC AUTO: 91.3 FL (ref 81.4–97.8)
MICROCYTES BLD QL SMEAR: ABNORMAL
MONOCYTES # BLD AUTO: 0.24 K/UL (ref 0–0.85)
MONOCYTES NFR BLD AUTO: 2.6 % (ref 0–13.4)
MORPHOLOGY BLD-IMP: NORMAL
NEUTROPHILS # BLD AUTO: 8.99 K/UL (ref 2–7.15)
NEUTROPHILS NFR BLD: 95.6 % (ref 44–72)
NRBC # BLD AUTO: 0.1 K/UL
NRBC BLD-RTO: 1.1 /100 WBC
OVALOCYTES BLD QL SMEAR: NORMAL
PHOSPHATE SERPL-MCNC: 5.4 MG/DL (ref 2.5–4.5)
PLATELET # BLD AUTO: 303 K/UL (ref 164–446)
PLATELET # BLD AUTO: 330 K/UL (ref 164–446)
PLATELET BLD QL SMEAR: NORMAL
PMV BLD AUTO: 10.4 FL (ref 9–12.9)
PMV BLD AUTO: 10.6 FL (ref 9–12.9)
POIKILOCYTOSIS BLD QL SMEAR: NORMAL
POLYCHROMASIA BLD QL SMEAR: NORMAL
POTASSIUM SERPL-SCNC: 4.9 MMOL/L (ref 3.6–5.5)
PRODUCT TYPE UPROD: NORMAL
PRODUCT TYPE UPROD: NORMAL
PROT SERPL-MCNC: 5.1 G/DL (ref 6–8.2)
RBC # BLD AUTO: 2.4 M/UL (ref 4.2–5.4)
RBC # BLD AUTO: 3.02 M/UL (ref 4.2–5.4)
RBC BLD AUTO: PRESENT
SODIUM SERPL-SCNC: 138 MMOL/L (ref 135–145)
TOXIC GRANULES BLD QL SMEAR: SLIGHT
UNIT STATUS USTAT: NORMAL
UNIT STATUS USTAT: NORMAL
WBC # BLD AUTO: 9.4 K/UL (ref 4.8–10.8)
WBC # BLD AUTO: 9.7 K/UL (ref 4.8–10.8)

## 2021-12-25 PROCEDURE — 84100 ASSAY OF PHOSPHORUS: CPT

## 2021-12-25 PROCEDURE — 82962 GLUCOSE BLOOD TEST: CPT

## 2021-12-25 PROCEDURE — 36430 TRANSFUSION BLD/BLD COMPNT: CPT

## 2021-12-25 PROCEDURE — 700102 HCHG RX REV CODE 250 W/ 637 OVERRIDE(OP): Performed by: HOSPITALIST

## 2021-12-25 PROCEDURE — 85007 BL SMEAR W/DIFF WBC COUNT: CPT

## 2021-12-25 PROCEDURE — 86923 COMPATIBILITY TEST ELECTRIC: CPT

## 2021-12-25 PROCEDURE — 700111 HCHG RX REV CODE 636 W/ 250 OVERRIDE (IP): Performed by: STUDENT IN AN ORGANIZED HEALTH CARE EDUCATION/TRAINING PROGRAM

## 2021-12-25 PROCEDURE — 85027 COMPLETE CBC AUTOMATED: CPT

## 2021-12-25 PROCEDURE — 93971 EXTREMITY STUDY: CPT | Mod: RT

## 2021-12-25 PROCEDURE — 700102 HCHG RX REV CODE 250 W/ 637 OVERRIDE(OP): Performed by: INTERNAL MEDICINE

## 2021-12-25 PROCEDURE — 700111 HCHG RX REV CODE 636 W/ 250 OVERRIDE (IP): Performed by: HOSPITALIST

## 2021-12-25 PROCEDURE — A9270 NON-COVERED ITEM OR SERVICE: HCPCS | Performed by: INTERNAL MEDICINE

## 2021-12-25 PROCEDURE — 93010 ELECTROCARDIOGRAM REPORT: CPT | Performed by: INTERNAL MEDICINE

## 2021-12-25 PROCEDURE — A9270 NON-COVERED ITEM OR SERVICE: HCPCS | Performed by: HOSPITALIST

## 2021-12-25 PROCEDURE — 700101 HCHG RX REV CODE 250: Performed by: INTERNAL MEDICINE

## 2021-12-25 PROCEDURE — 700117 HCHG RX CONTRAST REV CODE 255: Performed by: STUDENT IN AN ORGANIZED HEALTH CARE EDUCATION/TRAINING PROGRAM

## 2021-12-25 PROCEDURE — P9016 RBC LEUKOCYTES REDUCED: HCPCS

## 2021-12-25 PROCEDURE — C9113 INJ PANTOPRAZOLE SODIUM, VIA: HCPCS | Performed by: STUDENT IN AN ORGANIZED HEALTH CARE EDUCATION/TRAINING PROGRAM

## 2021-12-25 PROCEDURE — 83605 ASSAY OF LACTIC ACID: CPT

## 2021-12-25 PROCEDURE — 700105 HCHG RX REV CODE 258: Performed by: HOSPITALIST

## 2021-12-25 PROCEDURE — 93971 EXTREMITY STUDY: CPT | Mod: 26,RT | Performed by: INTERNAL MEDICINE

## 2021-12-25 PROCEDURE — 700111 HCHG RX REV CODE 636 W/ 250 OVERRIDE (IP): Performed by: INTERNAL MEDICINE

## 2021-12-25 PROCEDURE — 83735 ASSAY OF MAGNESIUM: CPT

## 2021-12-25 PROCEDURE — 80053 COMPREHEN METABOLIC PANEL: CPT

## 2021-12-25 PROCEDURE — 99233 SBSQ HOSP IP/OBS HIGH 50: CPT | Performed by: HOSPITALIST

## 2021-12-25 PROCEDURE — 770006 HCHG ROOM/CARE - MED/SURG/GYN SEMI*

## 2021-12-25 PROCEDURE — 74174 CTA ABD&PLVS W/CONTRAST: CPT | Mod: MC

## 2021-12-25 PROCEDURE — 93005 ELECTROCARDIOGRAM TRACING: CPT | Performed by: HOSPITALIST

## 2021-12-25 RX ORDER — DIPHENHYDRAMINE HYDROCHLORIDE 50 MG/ML
25 INJECTION INTRAMUSCULAR; INTRAVENOUS ONCE
Status: COMPLETED | OUTPATIENT
Start: 2021-12-25 | End: 2021-12-25

## 2021-12-25 RX ORDER — OMEPRAZOLE 20 MG/1
20 CAPSULE, DELAYED RELEASE ORAL DAILY
Status: DISCONTINUED | OUTPATIENT
Start: 2021-12-25 | End: 2021-12-29 | Stop reason: HOSPADM

## 2021-12-25 RX ORDER — SODIUM CHLORIDE 9 MG/ML
1000 INJECTION, SOLUTION INTRAVENOUS CONTINUOUS
Status: DISCONTINUED | OUTPATIENT
Start: 2021-12-25 | End: 2021-12-29 | Stop reason: HOSPADM

## 2021-12-25 RX ORDER — POLYETHYLENE GLYCOL 3350 17 G/17G
1 POWDER, FOR SOLUTION ORAL
Status: DISCONTINUED | OUTPATIENT
Start: 2021-12-25 | End: 2021-12-29 | Stop reason: HOSPADM

## 2021-12-25 RX ORDER — AMOXICILLIN 250 MG
2 CAPSULE ORAL 2 TIMES DAILY
Status: DISCONTINUED | OUTPATIENT
Start: 2021-12-25 | End: 2021-12-29 | Stop reason: HOSPADM

## 2021-12-25 RX ORDER — ACETAMINOPHEN 500 MG
500 TABLET ORAL 3 TIMES DAILY
Status: DISCONTINUED | OUTPATIENT
Start: 2021-12-25 | End: 2021-12-29 | Stop reason: HOSPADM

## 2021-12-25 RX ORDER — BISACODYL 10 MG
10 SUPPOSITORY, RECTAL RECTAL
Status: DISCONTINUED | OUTPATIENT
Start: 2021-12-25 | End: 2021-12-29 | Stop reason: HOSPADM

## 2021-12-25 RX ORDER — FUROSEMIDE 10 MG/ML
40 INJECTION INTRAMUSCULAR; INTRAVENOUS ONCE
Status: COMPLETED | OUTPATIENT
Start: 2021-12-25 | End: 2021-12-25

## 2021-12-25 RX ADMIN — OXYCODONE HYDROCHLORIDE 10 MG: 10 TABLET ORAL at 20:50

## 2021-12-25 RX ADMIN — ACETAMINOPHEN 500 MG: 500 TABLET ORAL at 20:48

## 2021-12-25 RX ADMIN — LIOTHYRONINE SODIUM 25 MCG: 25 TABLET ORAL at 06:03

## 2021-12-25 RX ADMIN — INSULIN HUMAN 3 UNITS: 100 INJECTION, SOLUTION PARENTERAL at 12:14

## 2021-12-25 RX ADMIN — OXYCODONE 5 MG: 5 TABLET ORAL at 06:19

## 2021-12-25 RX ADMIN — LEVOTHYROXINE SODIUM 75 MCG: 0.07 TABLET ORAL at 05:50

## 2021-12-25 RX ADMIN — PANTOPRAZOLE SODIUM 40 MG: 40 INJECTION, POWDER, FOR SOLUTION INTRAVENOUS at 05:52

## 2021-12-25 RX ADMIN — IOHEXOL 100 ML: 350 INJECTION, SOLUTION INTRAVENOUS at 03:05

## 2021-12-25 RX ADMIN — INSULIN HUMAN 3 UNITS: 100 INJECTION, SOLUTION PARENTERAL at 21:09

## 2021-12-25 RX ADMIN — ACETAMINOPHEN 650 MG: 325 TABLET, FILM COATED ORAL at 01:11

## 2021-12-25 RX ADMIN — HYDROCORTISONE SODIUM SUCCINATE 25 MG: 100 INJECTION, POWDER, FOR SOLUTION INTRAMUSCULAR; INTRAVENOUS at 13:44

## 2021-12-25 RX ADMIN — DEXAMETHASONE 0.5 MG: 1 TABLET ORAL at 09:19

## 2021-12-25 RX ADMIN — OMEPRAZOLE 20 MG: 20 CAPSULE, DELAYED RELEASE ORAL at 09:19

## 2021-12-25 RX ADMIN — HYDROCORTISONE SODIUM SUCCINATE 25 MG: 100 INJECTION, POWDER, FOR SOLUTION INTRAMUSCULAR; INTRAVENOUS at 20:52

## 2021-12-25 RX ADMIN — ACETAMINOPHEN 500 MG: 500 TABLET ORAL at 09:19

## 2021-12-25 RX ADMIN — DOCUSATE SODIUM 50 MG AND SENNOSIDES 8.6 MG 2 TABLET: 8.6; 5 TABLET, FILM COATED ORAL at 17:48

## 2021-12-25 RX ADMIN — INSULIN HUMAN 5 UNITS: 100 INJECTION, SUSPENSION SUBCUTANEOUS at 06:10

## 2021-12-25 RX ADMIN — GABAPENTIN 200 MG: 100 CAPSULE ORAL at 13:44

## 2021-12-25 RX ADMIN — DEXAMETHASONE 0.5 MG: 1 TABLET ORAL at 20:49

## 2021-12-25 RX ADMIN — DOCUSATE SODIUM 50 MG AND SENNOSIDES 8.6 MG 2 TABLET: 8.6; 5 TABLET, FILM COATED ORAL at 09:20

## 2021-12-25 RX ADMIN — INSULIN HUMAN 5 UNITS: 100 INJECTION, SUSPENSION SUBCUTANEOUS at 17:46

## 2021-12-25 RX ADMIN — FUROSEMIDE 40 MG: 10 INJECTION, SOLUTION INTRAMUSCULAR; INTRAVENOUS at 09:43

## 2021-12-25 RX ADMIN — DIPHENHYDRAMINE HYDROCHLORIDE 25 MG: 50 INJECTION INTRAMUSCULAR; INTRAVENOUS at 02:56

## 2021-12-25 RX ADMIN — ONDANSETRON 4 MG: 4 TABLET, ORALLY DISINTEGRATING ORAL at 09:20

## 2021-12-25 RX ADMIN — LIDOCAINE 1 PATCH: 50 PATCH TOPICAL at 09:00

## 2021-12-25 RX ADMIN — FLUDROCORTISONE ACETATE 0.2 MG: 0.1 TABLET ORAL at 17:49

## 2021-12-25 RX ADMIN — DULOXETINE HYDROCHLORIDE 60 MG: 60 CAPSULE, DELAYED RELEASE ORAL at 20:48

## 2021-12-25 RX ADMIN — HYDROCORTISONE SODIUM SUCCINATE 50 MG: 100 INJECTION, POWDER, FOR SOLUTION INTRAMUSCULAR; INTRAVENOUS at 05:52

## 2021-12-25 RX ADMIN — GABAPENTIN 200 MG: 100 CAPSULE ORAL at 20:49

## 2021-12-25 RX ADMIN — ACETAMINOPHEN 500 MG: 500 TABLET ORAL at 13:45

## 2021-12-25 RX ADMIN — FLUDROCORTISONE ACETATE 0.2 MG: 0.1 TABLET ORAL at 05:50

## 2021-12-25 RX ADMIN — SODIUM CHLORIDE 500 ML: 9 INJECTION, SOLUTION INTRAVENOUS at 09:14

## 2021-12-25 RX ADMIN — OXYCODONE HYDROCHLORIDE 10 MG: 10 TABLET ORAL at 13:45

## 2021-12-25 RX ADMIN — GABAPENTIN 200 MG: 100 CAPSULE ORAL at 05:50

## 2021-12-25 ASSESSMENT — ENCOUNTER SYMPTOMS
RESPIRATORY NEGATIVE: 1
BRUISES/BLEEDS EASILY: 0
NAUSEA: 1
FOCAL WEAKNESS: 0
WEAKNESS: 0
PALPITATIONS: 0
DIZZINESS: 0
COUGH: 0
DEPRESSION: 0
POLYDIPSIA: 0
HEARTBURN: 0
HEADACHES: 0
BACK PAIN: 0
CHILLS: 0
ABDOMINAL PAIN: 1
FEVER: 0
NECK PAIN: 0
NERVOUS/ANXIOUS: 1
VOMITING: 0
NEUROLOGICAL NEGATIVE: 1
MYALGIAS: 1
EYES NEGATIVE: 1

## 2021-12-25 ASSESSMENT — PAIN DESCRIPTION - PAIN TYPE
TYPE: ACUTE PAIN

## 2021-12-25 NOTE — PROGRESS NOTES
[facility] Medicine Daily Progress Note    Date of Service  12/25/2021    Chief Complaint  Donna Isaac is a 56 y.o. female admitted 12/1/2021 with abd/chest pain    Hospital Course  Ms Isaac has a complicated past medical history that includes traumatic brain injury, congestive heart failure and Valentín's disease.  She presented the emergency room on 12/1/2021 with complaints of abdominal pain.  Patient was found to have a pulmonary embolism and acute pancreatitis.  Patient started on heparin drip and admitted to the hospital she subsequently developed shock,  she was started on pressors and orders were changed to admit to the ICU.  While waiting for transfer the patient had cardiac arrest in the emergency room, CPR was per formed, she was intubated, she was resuscitated with massive transfusion protocol.  Patient has been supported with ventilator, and IVC filter has been placed 12/6 , her renal function has improved and she was extubated on 12/13/2021.  The patient diagnosed with left upper venous thrombosis including subclavian vein, left upper extremity venous thrombosis axillary vein, as well as acute deep vein thrombosis in the left posterior tibial vein.  On 12/23/2021 the patient had severe left upper quadrant abdominal pain as well as shock and was taken urgently to IR for stenting of the bleeding splenic artery aneurysm        Interval Problem Update  Patient seen and examined today.    Patient tolerating treatment and therapies.  All Data, Medication data reviewed.  Case discussed with nursing as available.  Plan of Care reviewed with patient and notified of changes.  12/25 the patient continues to have significant left upper quadrant abdominal pain and nausea, she had decreasing urine output, was undergoing several CT scans with contrast, s/p transfusion of 1 unit of red cells, hemoglobin up to 8.4 now, repeat CT abdomen this morning without new extravasation or blood collection.  Left upper  PICC line is flushing but not withdrawing  Prior ultrasound showed a superficial clot in that area  Ordering right upper extremity ultrasound to see if there is clot, then likely moving IV access to the right side and remove the PICC line.        I have personally seen and examined the patient at bedside. I discussed the plan of care with patient, bedside RN and pharmacy.    Consultants/Specialty  general surgery and nephrology  Critical care  Code Status  DNAR/DNI    Disposition  Patient is not medically cleared.   Anticipate discharge to to an inpatient rehabilitation hospital./[facility]  I have placed the appropriate orders for post-discharge needs.    Review of Systems  Review of Systems   Constitutional: Positive for malaise/fatigue. Negative for chills and fever.   HENT: Negative.    Eyes: Negative.    Respiratory: Negative.  Negative for cough.    Cardiovascular: Positive for leg swelling. Negative for chest pain and palpitations.   Gastrointestinal: Positive for abdominal pain and nausea. Negative for heartburn and vomiting.   Genitourinary: Negative.  Negative for dysuria and frequency.   Musculoskeletal: Positive for joint pain and myalgias. Negative for back pain and neck pain.   Skin: Negative.  Negative for itching and rash.   Neurological: Negative.  Negative for dizziness, focal weakness, weakness and headaches.   Endo/Heme/Allergies: Negative.  Negative for polydipsia. Does not bruise/bleed easily.   Psychiatric/Behavioral: Negative for depression. The patient is nervous/anxious.    All other systems reviewed and are negative.       Physical Exam  Temp:  [36 °C (96.8 °F)-37.5 °C (99.5 °F)] 36 °C (96.8 °F)  Pulse:  [] 89  Resp:  [16-41] 16  BP: ()/() 122/85  SpO2:  [93 %-97 %] 96 %    Physical Exam  Vitals and nursing note reviewed.   Constitutional:       General: She is not in acute distress.  HENT:      Head: Normocephalic and atraumatic.      Nose: Nose normal. No rhinorrhea.       Mouth/Throat:      Pharynx: No oropharyngeal exudate or posterior oropharyngeal erythema.   Eyes:      General: No scleral icterus.        Right eye: No discharge.         Left eye: No discharge.   Cardiovascular:      Rate and Rhythm: Normal rate and regular rhythm.      Heart sounds: Normal heart sounds. No murmur heard.  No friction rub. No gallop.    Pulmonary:      Effort: Pulmonary effort is normal. No respiratory distress.      Breath sounds: No stridor. Decreased breath sounds present. No wheezing or rhonchi.   Chest:      Chest wall: No tenderness.   Abdominal:      General: Bowel sounds are normal. There is distension.      Palpations: Abdomen is soft. There is no mass.      Tenderness: There is abdominal tenderness. There is no rebound.   Musculoskeletal:         General: Swelling present. No tenderness.      Cervical back: Neck supple. No rigidity.   Skin:     General: Skin is warm and dry.      Coloration: Skin is not cyanotic or jaundiced.      Nails: There is no clubbing.   Neurological:      General: No focal deficit present.      Mental Status: She is alert and oriented to person, place, and time.      Cranial Nerves: No cranial nerve deficit.      Motor: Weakness (Generalized) present.   Psychiatric:         Mood and Affect: Mood normal.         Behavior: Behavior normal.         Fluids    Intake/Output Summary (Last 24 hours) at 12/25/2021 0725  Last data filed at 12/25/2021 0600  Gross per 24 hour   Intake 1798.67 ml   Output 215 ml   Net 1583.67 ml       Laboratory  Recent Labs     12/23/21  1520 12/23/21  1939 12/24/21  0230 12/24/21  0916 12/25/21  0057   WBC 14.3*  --  15.6*  --  9.4   RBC 3.37*  --  2.92*  --  2.40*   HEMOGLOBIN 9.6*   < > 8.3* 8.1* 6.8*   HEMATOCRIT 30.5*   < > 26.2* 26.1* 21.9*   MCV 90.5  --  89.7  --  91.3   MCH 28.5  --  28.4  --  28.3   MCHC 31.5*  --  31.7*  --  31.1*   RDW 68.4*  --  71.0*  --  73.9*   PLATELETCT 319  --  333  --  330   MPV 11.7  --  10.5  --  10.6     < > = values in this interval not displayed.     Recent Labs     12/23/21  1002 12/24/21  0230 12/25/21  0057   SODIUM 140 138 138   POTASSIUM 3.8 4.4 4.9   CHLORIDE 107 104 105   CO2 23 20 22   GLUCOSE 132* 229* 144*   BUN 8 12 23*   CREATININE 0.61 0.78 1.25   CALCIUM 7.6* 8.6 8.7     Recent Labs     12/23/21  1002   APTT 40.3*   INR 1.32*               Imaging  CTA ABDOMEN PELVIS W & W/O POST PROCESS   Final Result         1. Grossly unchanged large hematoma in the left abdomen.   2. No active contrast extravasation seen.   3. Retention of IV contrast in the kidneys from prior study concerning for contrast nephropathy.   4. Moderate left pleural effusion. Patchy bibasilar atelectasis.      IR-EMBOLIZATION   Final Result      Small pseudoaneurysm with bleeding arising from the splenic artery, successfully treated with covered stent               CT-CHEST,ABDOMEN,PELVIS WITH   Final Result      1.  Tiny focal contrast blush along the inferior margin of the dominant hematoma.   2.  A vessel cutoff along the posterior margin of the smaller hematoma, with a small adjacent 5 mm arterial aneurysm versus venous phlebolith. Either one of these may be the source of patient's bleeding.   3.  Hemoperitoneum.   4.  Stable moderate left effusion with associated atelectasis. Trace right effusion.   5.  Stable scattered groundglass opacities in the upper lobes, likely infectious/inflammatory. Active pneumonia should be considered.   6.  A 4 cm ascending aortic aneurysm.   7.  Findings of anemia.      CT-CHEST,ABDOMEN,PELVIS W/O   Final Result      1.  Large, 15 cm hematoma between the greater curvature of the excluded stomach and transverse colon. It is new since prior study.   2.  A smaller, 5.6 cm hematoma posterior to the gastric fundus, also new, possibly in the gastric fundus wall or at the bypass surgical staple line.   3.  Worsening hemoperitoneum.   4.  Slightly hyperdense moderate left effusion with associated  atelectasis. Trace right effusion.   5.  Scattered groundglass opacities in the upper lobes, new since prior study, likely infectious/inflammatory. Active pneumonia should be considered.   6.  A 4 cm ascending aortic aneurysm.   7.  Findings of anemia.      Findings were discussed with Dr. Mirza on 12/23/2021 12:12 PM.      DX-FOOT-2- LEFT   Final Result      Soft tissue swelling without acute osseous abnormality.      DX-CHEST-PORTABLE (1 VIEW)   Final Result         1.  Mild edema and/or infiltrate, particularly on the right, stable since prior study   2.  Cardiomegaly      DX-CHEST-PORTABLE (1 VIEW)   Final Result         1.  Mild edema and/or infiltrate, particularly on the right   2.  Cardiomegaly      DX-CHEST-PORTABLE (1 VIEW)   Final Result         1.  Bibasilar atelectasis or early infiltrate   2.  Cardiomegaly      DX-CHEST-PORTABLE (1 VIEW)   Final Result      1.  Bibasilar underinflation atelectasis which could obscure an additional process. This is unchanged.   2.  Suspect small LEFT pleural effusion      DX-CHEST-PORTABLE (1 VIEW)   Final Result         1.  Bilateral basilar atelectasis, no focal infiltrate   2.  Cardiomegaly      DX-CHEST-PORTABLE (1 VIEW)   Final Result         1.  Bilateral basilar atelectasis, no focal infiltrate   2.  Cardiomegaly      DX-CHEST-PORTABLE (1 VIEW)   Final Result         1.  Left lower lobe atelectasis or infiltrates, similar compared to prior study.   2.  Cardiomegaly      DX-CHEST-PORTABLE (1 VIEW)   Final Result         1.  Left lower lobe atelectasis or infiltrates, similar compared to prior study.   2.  Cardiomegaly      DX-CHEST-PORTABLE (1 VIEW)   Final Result         1.  Left lower lobe atelectasis or infiltrates, similar compared to prior study.   2.  Cardiomegaly      IR-INSERT IVC FILTER WITH IG & SI   Final Result      Ultrasound and fluoroscopic guided placement of an Option Elite IVC filter.      DX-CHEST-PORTABLE (1 VIEW)   Final Result         1.   Pulmonary edema and/or infiltrates are identified, which are stable since the prior exam.   2.  Cardiomegaly      DX-CHEST-PORTABLE (1 VIEW)   Final Result      Tubes and lines as described above.      Bibasilar atelectasis with small pleural effusions.      DX-CHEST-PORTABLE (1 VIEW)   Final Result         1. No significant interval change.      [id number] VIEW   Final Result      Cortrak feeding tube tip projects in the region of the mid stomach.      DX-CHEST-PORTABLE (1 VIEW)   Final Result      Placement of right IJ dialysis catheter.      Otherwise stable findings.      DX-CHEST-PORTABLE (1 VIEW)   Final Result         1.  Pulmonary edema and/or infiltrates are identified, which are stable since the prior exam.   2.  Cardiomegaly      DX-CHEST-PORTABLE (1 VIEW)   Final Result         1.  Pulmonary edema and/or infiltrates are identified, which are stable since the prior exam.   2.  Cardiomegaly      DX-CHEST-FOR LINE PLACEMENT Perform procedure in: Patient's Room   Final Result      Interval placement of a left IJ central venous catheter with the tip overlying the right atrium.      US-EXTREMITY VENOUS LOWER BILAT   Final Result      CTA ABDOMEN PELVIS W & W/O POST PROCESS   Final Result      1.  Small to moderate amount of hemoperitoneum in the abdomen and pelvis. No active extravasation is identified.   2.  No aortic aneurysm or dissection.   3.  Peripancreatic stranding can be related to pancreatitis.   4.  Cardiomegaly.   5.  Hepatic steatosis.   6.  Status post cholecystectomy.   7.  Atrophic kidneys bilaterally.   8.  Status post gastric bypass surgery.   9.  Mild wall thickening of the second portion of the duodenum with surrounding inflammation. This can be seen in duodenitis/peptic ulcer disease.   10.  Colonic diverticulosis.   11.  Bibasilar atelectasis/consolidation. Groundglass opacities with septal thickening at the lung bases can be seen in the setting of edema or multifocal pneumonia.   12.   Bilateral anterior rib fractures.      Findings discussed with Dr. Rush.      DX-CHEST-PORTABLE (1 VIEW)   Final Result      1.  Endotracheal tube present.      2.  Cardiomegaly with interstitial prominence.      EC-ECHOCARDIOGRAM COMPLETE W/ CONT   Final Result      CT-CTA CHEST PULMONARY ARTERY W/ RECONS   Final Result      1.  Positive for pulmonary embolism located in multiple segmental and subsegmental branches      2.  Minimal elevation of RV/LV ratio      3.  Mild atelectasis      4.  Left PICC line present and appears appropriately located      5.  Findings were discussed with BLAINE VEGA on 12/1/2021 5:35 PM.                  CT-ABDOMEN-PELVIS WITH   Final Result      1.  Apparent inflammation of the pancreas with surrounding fat stranding and fluid suggesting pancreatitis. Recommend correlation with lipase.      2.  Diverticulosis without diverticulitis.      3.  Hepatic steatosis.      US-EXTREMITY VENOUS UPPER UNILAT LEFT   Final Result      DX-CHEST-PORTABLE (1 VIEW)   Final Result      1.  No acute cardiopulmonary abnormality identified.      2.  Left PICC line appears appropriately located           Assessment/Plan  * Hemoperitoneum- (present on admission)  Assessment & Plan  In setting of acute pancreatitis in a pt on full dose anticoagulation  Worsened with new bleeding episode    Hemorrhagic shock (HCC)- (present on admission)  Assessment & Plan  shock resolved  Continue close clinical monitoring    Splenic artery aneurysm (HCC)  Assessment & Plan  With hemorrhagic shock status post stenting on 12/23/2021  If hemoglobin remains stable may need aspirin and Plavix for stent patency per IR      Acute respiratory failure with hypoxia (HCC)- (present on admission)  Assessment & Plan  Intubated due to hemorrhagic shock  Extubated 12/13    RT protocol  Aspiration precautions    Leukocytosis- (present on admission)  Assessment & Plan  Likely reactive  Procalcitonin mildly elevated patient with  improving oxygen requirements and no other clinical signs of pneumonia  Continue close clinical monitoring off antibiotics for now    Pulmonary hypertension (HCC)- (present on admission)  Assessment & Plan  ROSSI, Hx PE  Continue oxygen      Atelectasis  Assessment & Plan  Incentive spirometry, positional changes, alveolar recruiting    Paroxysmal atrial fibrillation ([facility])- (present on admission)  Assessment & Plan  Currently in sinus  Anticoagulation contraindicated at this time given recurrent intra-abdominal bleeding    DVT (deep venous thrombosis) ([facility])- (present on admission)  Assessment & Plan  Upper extremity as well as lower extremity  IVC in place  Anticoagulation contraindicated at this time given recurrent severe bleed    Cardiac arrest ([facility])- (present on admission)  Assessment & Plan  Secondary to hemorrhagic shock    Shock ([facility])- (present on admission)  Assessment & Plan  Hemorrhagic from intra-abd bleed    Resolved    Acute pancreatitis- (present on admission)  Assessment & Plan  resolved    PE (pulmonary thromboembolism) ([facility])- (present on admission)  Assessment & Plan  S/p anticoagulation attempts, given recurrent bleeding anticoagulation discontinued and is contraindicated at this time  Patient underwent IVC placement    Debilitated patient  Assessment & Plan  PT/OT  Needs Rehab however at least at this point would not tolerate 3 hrs of daily therapy  LTAC referral    Acute blood loss anemia- (present on admission)  Assessment & Plan  Status post stenting of a splenic artery pseudoaneurysm  Prior hemoperitoneum  S/p multiple transfusions  The patient does not appear to be a candidate for further anticoagulation unfortunately    Adrenal insufficiency (Valentín's disease) ([facility])- (present on admission)  Assessment & Plan  On home regimen per her endocrinologist: hydrocortisone, dexamthasone, florinef    Start tapering stress dose hydrocortisone    MRSA and E. coli sinusitis- (present on  admission)  Assessment & Plan  Completed 6 weeks of IV ceftriaxone and vancomycin on 12/21/2021    Acute kidney injury (HCC)- (present on admission)  Assessment & Plan  Renal function has since normalized, but the patient now given multiple doses of IV contrast, monitor closely      Hypothyroidism- (present on admission)  Assessment & Plan  Continue thyroid replacement therapy    Obesity (BMI 35.0-39.9 without comorbidity)- (present on admission)  Assessment & Plan  For future weight management     Plan  Overall complex and high risk hospitalization course  The patient at this time does appear to not be a further candidate for anticoagulation given multiple bleeding episodes on anticoagulation  Ongoing monitoring for blood loss and stability  Not a good candidate to remove blood collection intra-abdominally  Pain control  Nausea control  Might need to remove PICC line in the left if there is otherwise noncompromised venous status on the right  Current pain status and malaise prohibits much activity and rehab    Highly complex and medically high risk case  See orders    VTE prophylaxis: SCD    I have performed a physical exam and reviewed and updated ROS and Plan today (12/25/2021). In review of yesterday's note (12/24/2021), there are no changes except as documented above.        Please note that this dictation was created using voice recognition software. I have made every reasonable attempt to correct obvious errors, but I expect that there are errors of grammar and possibly context that I did not discover before finalizing the note.

## 2021-12-25 NOTE — PALLIATIVE CARE
Palliative Care follow-up  Pt is moving to a medical floor. PC RN touched base with Pt and her , we have been following. Pt was alert and awake telling PC RN jokes. She does not have much of an appetite and she states that she is ready for ice chips but not much else. Pt expressed she was having pain and was requesting something to help manage pain. Colin has PC RN number and if he has any questions he will call. He is hopeful that she will improve now that they have the source of her bleeding and is greatful that IR was able to perform the sent of the artery.       Updated: team    Plan: continue to follow and support pt and family    Thank you for allowing Palliative Care to support this patient and family. Contact [phone] for additional assistance, change in patient status, or with any questions/concerns.

## 2021-12-25 NOTE — PROGRESS NOTES
Pt's HR has maintained sinus tachycardia: 100's-110's throughout shift. RN witnessed HR jump up into the 150's quickly and then slowly drop into the high 70's/ low 80's. BP taken. Pt is now hypotensive with systolic reading 70's-80's with a MAP in the 50's. Pt states lower abdominal pain slightly worsening. No abdominal changes from beginning of shift. AM labs drawn. Hospitalist paged to bedside. Systolic BP slowly coming up without intervention. No new orders at this time.

## 2021-12-25 NOTE — PROGRESS NOTES
Received report from [facility] RN, pt care assumed. VS stable on 2L . Pt arrived to unit via hospital bed. Call light within reach, hourly rounding initiated. Pt is AAOx4.

## 2021-12-25 NOTE — CARE PLAN
The patient is Unstable - High likelihood or risk of patient condition declining or worsening    Shift Goals  Clinical Goals: Increase urine output, decrease pain, mobilize.  Patient Goals: Pain control.    Progress made toward(s) clinical / shift goals:  Pt's urinary output did not increase over the shift. Pt's abdominal pain remained present despite interventions. Pt did not mobilize this shift.     Patient is not progressing towards the following goals:    Problem: Pain - Standard  Goal: Alleviation of pain or a reduction in pain to the patient’s comfort goal  Outcome: Not Progressing  Note: Pt reports variable abdominal pain. Unable to give PRN narcotics d/t hypotensive episode.      Problem: Knowledge Deficit - Standard  Goal: Patient and family/care givers will demonstrate understanding of plan of care, disease process/condition, diagnostic tests and medications  Outcome: Not Progressing  Note: Pt is not demonstrating understanding of POC or goals of care.      Problem: Urinary - Renal Perfusion  Goal: Ability to achieve and maintain adequate renal perfusion and functioning will improve  Outcome: Not Progressing  Note: Pt's urinary output was less than adequate throughout shift. Hospitalist updated.      Problem: Nutrition  Goal: Patient's nutritional and fluid intake will be adequate or improve  Outcome: Not Progressing  Note: Pt did not want to eat dinner.    Patient is progressing towards the following goals:    Problem: Skin Integrity  Goal: Skin integrity is maintained or improved  Outcome: Progressing  Note: Q2 turns in place.

## 2021-12-26 PROBLEM — Z66 DNR (DO NOT RESUSCITATE): Status: ACTIVE | Noted: 2021-12-26

## 2021-12-26 LAB
ALBUMIN SERPL BCP-MCNC: 2.3 G/DL (ref 3.2–4.9)
ALBUMIN/GLOB SERPL: 0.7 G/DL
ALP SERPL-CCNC: 143 U/L (ref 30–99)
ALT SERPL-CCNC: 21 U/L (ref 2–50)
ANION GAP SERPL CALC-SCNC: 11 MMOL/L (ref 7–16)
ANISOCYTOSIS BLD QL SMEAR: ABNORMAL
AST SERPL-CCNC: 30 U/L (ref 12–45)
BASOPHILS # BLD AUTO: 0 % (ref 0–1.8)
BASOPHILS # BLD: 0 K/UL (ref 0–0.12)
BILIRUB SERPL-MCNC: 0.7 MG/DL (ref 0.1–1.5)
BUN SERPL-MCNC: 30 MG/DL (ref 8–22)
CALCIUM SERPL-MCNC: 8.9 MG/DL (ref 8.5–10.5)
CHLORIDE SERPL-SCNC: 101 MMOL/L (ref 96–112)
CO2 SERPL-SCNC: 23 MMOL/L (ref 20–33)
CREAT SERPL-MCNC: 1.14 MG/DL (ref 0.5–1.4)
EOSINOPHIL # BLD AUTO: 0 K/UL (ref 0–0.51)
EOSINOPHIL NFR BLD: 0 % (ref 0–6.9)
ERYTHROCYTE [DISTWIDTH] IN BLOOD BY AUTOMATED COUNT: 70.9 FL (ref 35.9–50)
GLOBULIN SER CALC-MCNC: 3.3 G/DL (ref 1.9–3.5)
GLUCOSE BLD-MCNC: 114 MG/DL (ref 65–99)
GLUCOSE BLD-MCNC: 117 MG/DL (ref 65–99)
GLUCOSE BLD-MCNC: 183 MG/DL (ref 65–99)
GLUCOSE BLD-MCNC: 218 MG/DL (ref 65–99)
GLUCOSE SERPL-MCNC: 119 MG/DL (ref 65–99)
HCT VFR BLD AUTO: 27.8 % (ref 37–47)
HGB BLD-MCNC: 8.2 G/DL (ref 12–16)
LYMPHOCYTES # BLD AUTO: 0.61 K/UL (ref 1–4.8)
LYMPHOCYTES NFR BLD: 4.4 % (ref 22–41)
MACROCYTES BLD QL SMEAR: ABNORMAL
MAGNESIUM SERPL-MCNC: 1.9 MG/DL (ref 1.5–2.5)
MANUAL DIFF BLD: NORMAL
MCH RBC QN AUTO: 27 PG (ref 27–33)
MCHC RBC AUTO-ENTMCNC: 29.5 G/DL (ref 33.6–35)
MCV RBC AUTO: 91.4 FL (ref 81.4–97.8)
MONOCYTES # BLD AUTO: 0.72 K/UL (ref 0–0.85)
MONOCYTES NFR BLD AUTO: 5.2 % (ref 0–13.4)
MORPHOLOGY BLD-IMP: NORMAL
NEUTROPHILS # BLD AUTO: 12.57 K/UL (ref 2–7.15)
NEUTROPHILS NFR BLD: 90.4 % (ref 44–72)
NRBC # BLD AUTO: 0.27 K/UL
NRBC BLD-RTO: 1.9 /100 WBC
OVALOCYTES BLD QL SMEAR: NORMAL
PHOSPHATE SERPL-MCNC: 5.8 MG/DL (ref 2.5–4.5)
PLATELET # BLD AUTO: 330 K/UL (ref 164–446)
PLATELET BLD QL SMEAR: NORMAL
PMV BLD AUTO: 9.9 FL (ref 9–12.9)
POIKILOCYTOSIS BLD QL SMEAR: NORMAL
POLYCHROMASIA BLD QL SMEAR: NORMAL
POTASSIUM SERPL-SCNC: 4.7 MMOL/L (ref 3.6–5.5)
PROT SERPL-MCNC: 5.6 G/DL (ref 6–8.2)
RBC # BLD AUTO: 3.04 M/UL (ref 4.2–5.4)
RBC BLD AUTO: PRESENT
SODIUM SERPL-SCNC: 135 MMOL/L (ref 135–145)
WBC # BLD AUTO: 13.9 K/UL (ref 4.8–10.8)

## 2021-12-26 PROCEDURE — 80053 COMPREHEN METABOLIC PANEL: CPT

## 2021-12-26 PROCEDURE — 85007 BL SMEAR W/DIFF WBC COUNT: CPT

## 2021-12-26 PROCEDURE — 82962 GLUCOSE BLOOD TEST: CPT | Mod: 91

## 2021-12-26 PROCEDURE — A9270 NON-COVERED ITEM OR SERVICE: HCPCS | Performed by: STUDENT IN AN ORGANIZED HEALTH CARE EDUCATION/TRAINING PROGRAM

## 2021-12-26 PROCEDURE — 700102 HCHG RX REV CODE 250 W/ 637 OVERRIDE(OP): Performed by: INTERNAL MEDICINE

## 2021-12-26 PROCEDURE — 85027 COMPLETE CBC AUTOMATED: CPT

## 2021-12-26 PROCEDURE — 94640 AIRWAY INHALATION TREATMENT: CPT

## 2021-12-26 PROCEDURE — 700111 HCHG RX REV CODE 636 W/ 250 OVERRIDE (IP): Performed by: STUDENT IN AN ORGANIZED HEALTH CARE EDUCATION/TRAINING PROGRAM

## 2021-12-26 PROCEDURE — 770006 HCHG ROOM/CARE - MED/SURG/GYN SEMI*

## 2021-12-26 PROCEDURE — 94660 CPAP INITIATION&MGMT: CPT

## 2021-12-26 PROCEDURE — A9270 NON-COVERED ITEM OR SERVICE: HCPCS | Performed by: INTERNAL MEDICINE

## 2021-12-26 PROCEDURE — 83735 ASSAY OF MAGNESIUM: CPT

## 2021-12-26 PROCEDURE — 99233 SBSQ HOSP IP/OBS HIGH 50: CPT | Performed by: STUDENT IN AN ORGANIZED HEALTH CARE EDUCATION/TRAINING PROGRAM

## 2021-12-26 PROCEDURE — A9270 NON-COVERED ITEM OR SERVICE: HCPCS | Performed by: HOSPITALIST

## 2021-12-26 PROCEDURE — 700102 HCHG RX REV CODE 250 W/ 637 OVERRIDE(OP): Performed by: STUDENT IN AN ORGANIZED HEALTH CARE EDUCATION/TRAINING PROGRAM

## 2021-12-26 PROCEDURE — 700101 HCHG RX REV CODE 250: Performed by: STUDENT IN AN ORGANIZED HEALTH CARE EDUCATION/TRAINING PROGRAM

## 2021-12-26 PROCEDURE — 700101 HCHG RX REV CODE 250: Performed by: INTERNAL MEDICINE

## 2021-12-26 PROCEDURE — 94760 N-INVAS EAR/PLS OXIMETRY 1: CPT

## 2021-12-26 PROCEDURE — 700102 HCHG RX REV CODE 250 W/ 637 OVERRIDE(OP): Performed by: HOSPITALIST

## 2021-12-26 PROCEDURE — 84100 ASSAY OF PHOSPHORUS: CPT

## 2021-12-26 PROCEDURE — 700111 HCHG RX REV CODE 636 W/ 250 OVERRIDE (IP): Performed by: HOSPITALIST

## 2021-12-26 RX ORDER — HYDROCORTISONE 10 MG/1
20 TABLET ORAL DAILY
Status: DISCONTINUED | OUTPATIENT
Start: 2021-12-27 | End: 2021-12-29 | Stop reason: HOSPADM

## 2021-12-26 RX ORDER — METHYLPREDNISOLONE SODIUM SUCCINATE 40 MG/ML
40 INJECTION, POWDER, LYOPHILIZED, FOR SOLUTION INTRAMUSCULAR; INTRAVENOUS EVERY 8 HOURS
Status: DISCONTINUED | OUTPATIENT
Start: 2021-12-26 | End: 2021-12-29 | Stop reason: HOSPADM

## 2021-12-26 RX ORDER — HYDROCORTISONE 10 MG/1
10-20 TABLET ORAL 2 TIMES DAILY
Status: DISCONTINUED | OUTPATIENT
Start: 2021-12-26 | End: 2021-12-26

## 2021-12-26 RX ORDER — HYDROCORTISONE 10 MG/1
10 TABLET ORAL EVERY EVENING
Status: DISCONTINUED | OUTPATIENT
Start: 2021-12-26 | End: 2021-12-29 | Stop reason: HOSPADM

## 2021-12-26 RX ORDER — MONTELUKAST SODIUM 10 MG/1
10 TABLET ORAL EVERY EVENING
Status: DISCONTINUED | OUTPATIENT
Start: 2021-12-26 | End: 2021-12-29 | Stop reason: HOSPADM

## 2021-12-26 RX ORDER — BUDESONIDE AND FORMOTEROL FUMARATE DIHYDRATE 160; 4.5 UG/1; UG/1
2 AEROSOL RESPIRATORY (INHALATION)
Status: DISCONTINUED | OUTPATIENT
Start: 2021-12-26 | End: 2021-12-29 | Stop reason: HOSPADM

## 2021-12-26 RX ADMIN — OXYCODONE HYDROCHLORIDE 10 MG: 10 TABLET ORAL at 04:46

## 2021-12-26 RX ADMIN — METHYLPREDNISOLONE SODIUM SUCCINATE 40 MG: 40 INJECTION, POWDER, FOR SOLUTION INTRAMUSCULAR; INTRAVENOUS at 21:01

## 2021-12-26 RX ADMIN — ACETAMINOPHEN 500 MG: 500 TABLET ORAL at 08:17

## 2021-12-26 RX ADMIN — OMEPRAZOLE 20 MG: 20 CAPSULE, DELAYED RELEASE ORAL at 04:47

## 2021-12-26 RX ADMIN — DULOXETINE HYDROCHLORIDE 60 MG: 60 CAPSULE, DELAYED RELEASE ORAL at 21:01

## 2021-12-26 RX ADMIN — INSULIN HUMAN 5 UNITS: 100 INJECTION, SUSPENSION SUBCUTANEOUS at 06:31

## 2021-12-26 RX ADMIN — DOCUSATE SODIUM 50 MG AND SENNOSIDES 8.6 MG 2 TABLET: 8.6; 5 TABLET, FILM COATED ORAL at 04:46

## 2021-12-26 RX ADMIN — GABAPENTIN 200 MG: 100 CAPSULE ORAL at 14:20

## 2021-12-26 RX ADMIN — ACETAMINOPHEN 500 MG: 500 TABLET ORAL at 14:20

## 2021-12-26 RX ADMIN — ACETAMINOPHEN 500 MG: 500 TABLET ORAL at 21:01

## 2021-12-26 RX ADMIN — LIOTHYRONINE SODIUM 25 MCG: 25 TABLET ORAL at 04:48

## 2021-12-26 RX ADMIN — DEXAMETHASONE 0.5 MG: 1 TABLET ORAL at 08:16

## 2021-12-26 RX ADMIN — ALBUTEROL SULFATE 2.5 MG: 2.5 SOLUTION RESPIRATORY (INHALATION) at 20:48

## 2021-12-26 RX ADMIN — LIDOCAINE 1 PATCH: 50 PATCH TOPICAL at 08:17

## 2021-12-26 RX ADMIN — BUDESONIDE AND FORMOTEROL FUMARATE DIHYDRATE 2 PUFF: 160; 4.5 AEROSOL RESPIRATORY (INHALATION) at 16:57

## 2021-12-26 RX ADMIN — ALBUTEROL SULFATE 2.5 MG: 2.5 SOLUTION RESPIRATORY (INHALATION) at 16:50

## 2021-12-26 RX ADMIN — OXYCODONE HYDROCHLORIDE 10 MG: 10 TABLET ORAL at 11:42

## 2021-12-26 RX ADMIN — ALBUTEROL SULFATE 2.5 MG: 2.5 SOLUTION RESPIRATORY (INHALATION) at 10:00

## 2021-12-26 RX ADMIN — INSULIN HUMAN 4 UNITS: 100 INJECTION, SOLUTION PARENTERAL at 21:01

## 2021-12-26 RX ADMIN — LEVOTHYROXINE SODIUM 75 MCG: 0.07 TABLET ORAL at 04:47

## 2021-12-26 RX ADMIN — FLUDROCORTISONE ACETATE 0.2 MG: 0.1 TABLET ORAL at 17:03

## 2021-12-26 RX ADMIN — METHYLPREDNISOLONE SODIUM SUCCINATE 40 MG: 40 INJECTION, POWDER, FOR SOLUTION INTRAMUSCULAR; INTRAVENOUS at 14:21

## 2021-12-26 RX ADMIN — GABAPENTIN 200 MG: 100 CAPSULE ORAL at 04:47

## 2021-12-26 RX ADMIN — GABAPENTIN 200 MG: 100 CAPSULE ORAL at 21:01

## 2021-12-26 RX ADMIN — FLUDROCORTISONE ACETATE 0.2 MG: 0.1 TABLET ORAL at 04:47

## 2021-12-26 RX ADMIN — HYDROCORTISONE 10 MG: 10 TABLET ORAL at 17:03

## 2021-12-26 RX ADMIN — HYDROCORTISONE SODIUM SUCCINATE 25 MG: 100 INJECTION, POWDER, FOR SOLUTION INTRAMUSCULAR; INTRAVENOUS at 04:46

## 2021-12-26 RX ADMIN — DOCUSATE SODIUM 50 MG AND SENNOSIDES 8.6 MG 2 TABLET: 8.6; 5 TABLET, FILM COATED ORAL at 17:04

## 2021-12-26 RX ADMIN — MONTELUKAST 10 MG: 10 TABLET, FILM COATED ORAL at 17:04

## 2021-12-26 ASSESSMENT — ENCOUNTER SYMPTOMS
PALPITATIONS: 0
BACK PAIN: 0
NERVOUS/ANXIOUS: 1
COUGH: 0
VOMITING: 0
ABDOMINAL PAIN: 1
NECK PAIN: 0
NAUSEA: 1
HEARTBURN: 0
POLYDIPSIA: 0
DIZZINESS: 0
DEPRESSION: 0
BRUISES/BLEEDS EASILY: 0
CHILLS: 0
RESPIRATORY NEGATIVE: 1
NEUROLOGICAL NEGATIVE: 1
FEVER: 0
FOCAL WEAKNESS: 0
EYES NEGATIVE: 1
MYALGIAS: 1
WEAKNESS: 0
HEADACHES: 0

## 2021-12-26 ASSESSMENT — PAIN DESCRIPTION - PAIN TYPE
TYPE: ACUTE PAIN

## 2021-12-26 NOTE — PROGRESS NOTES
"Received bedside report from night shift RN.   Assumed care of patient at change of shift.   Assessment complete and POC discussed.   A&Ox4, Pulse 105, RR 22, vitals otherwise stable, on 2L via NC - baseline is RA.   (+) expiratory wheezing in upper lobes. Messaged MD for albuterol/breathing treatments.  Refused breakfast this morning, states she isn't hungry.  Perez catheter is patent and draining appropriately.   Q2 hour turns implemented, CNA aware.  SCDs are on bilateral lower extremities.  Bed is in lowest/locked position.   Call light and belongings are within reach.   No further needs at this time.    0720: Called May from lab to collect scheduled labs. No blood return thorough PICC line.     1315: Went in to round on patient. Found patient without oxygen on. Put oxygen back on. Patient initially satting at 62%. Increased quickly after putting oxygen back on. Hooked up  at bedside. Patient states she took the oxygen off to blow her nose and forgot to put it back on.    1500: PICC line removed per orders. Pressure held for 5 minutes. Gauze and tegaderm in place.    1630: Patient refused dinner tray, tray sent back to kitchen. Patient states \"I'm not hungry\". FSBG 184, but patient states she won't be eating tonight. Messaged MD to see if insulin should be held. Per MD, hold both long acting and regular insulin.  "

## 2021-12-26 NOTE — PROGRESS NOTES
Pt alert/oriented x4, medicated for abdominal pain per MAR. Pt on q2h turns. Perez catheter patent. Pt resting quietly in bed watching television, needs met. Call light within reach, personal belongings available, bed in lowest position, treaded socks on, and bed alarm on. Hourly rounding in place.
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
## 2021-12-26 NOTE — ASSESSMENT & PLAN NOTE
Not on asthma exacerbation.  Cont albuterol, symbicort and montelukast  Continue chronic hydrocortisone.

## 2021-12-26 NOTE — PROGRESS NOTES
4 Eyes Skin Assessment Completed by AILYN Jonse and AILYN Gray.    Head WDL  Ears WDL  Nose WDL  Mouth WDL  Neck Bruising, left side  Breast/Chest Redness , blanching under breasts  Shoulder Blades WDL  Spine WDL  (R) Arm/Elbow/Hand Bruising, Scab and Edema  (L) Arm/Elbow/Hand Bruising and Scab, skin tear  Abdomen Redness and Blanching under pannus  Groin WDL  Scrotum/Coccyx/Buttocks Redness and Non-Blanching  (R) Leg Edema, open wound on right upper thigh.   (L) Leg : Edema and non-blanching area to left achilles  (R) Heel/Foot/Toe Edema  (L) Heel/Foot/Toe Non-Blanching area to plantar foot, edema        Devices In Places Perez, Central Line and Nasal Cannula      Interventions In Place Gray Ear Foams, Heel Float Boots, Waffle Overlay, Pillows, Q2 Turns and Pressure Redistribution Mattress    Possible Skin Injury Yes    Pictures Uploaded Into Epic Yes  Wound Consult Placed Yes  RN Wound Prevention Protocol Ordered Yes

## 2021-12-26 NOTE — CARE PLAN
The patient is Watcher - Medium risk of patient condition declining or worsening    Shift Goals  Clinical Goals: Breathing treatments, pain management    Progress made toward(s) clinical / shift goals:  Patient wheezing on auscultation and is also tachypneic. Mohinder TRAYLOR. New orders placed for breathing treatments and steroids. Pain managed with PRN Oxycodone.

## 2021-12-26 NOTE — PROGRESS NOTES
Hospital Medicine Daily Progress Note    Date of Service  12/26/2021    Chief Complaint  Donna Isaac is a 56 y.o. female admitted 12/1/2021 with abd/chest pain    Hospital Course  Ms Isaac has a complicated past medical history that includes traumatic brain injury, congestive heart failure and Valentín's disease and PE 4/2021 completed 3 months of eliquis.  She presented the emergency room on 12/1/2021 with complaints of abdominal pain.  Patient was found to have a pulmonary embolism and acute pancreatitis.  Patient started on heparin drip and admitted to the hospital she subsequently developed hemorrhagic shock,  she was started on pressors and orders were changed to admit to the ICU.  While waiting for transfer the patient had cardiac arrest in the emergency room, CPR was per formed, she was intubated, she was resuscitated with massive transfusion protocol. CTA abd noted Small to moderate amount of hemoperitoneum in the abdomen and pelvis. Surgery was consulted, poor surgical candidate, cont monitoring Hb.   Patient has been supported with ventilator, and IVC filter has been placed 12/6 , her renal function has improved and she was extubated on 12/13/2021.  The patient diagnosed with left upper venous thrombosis including subclavian vein, axillary vein, acute deep vein thrombosis in the left posterior tibial vein 12/1 and 12/2. She is also found to have DVT RUE in one of the paired mid-distal brachial vein 12/25.   Lovenox was resumed on 12/18, which was stopped on 12/23  On 12/23/2021 the patient had severe left upper quadrant abdominal pain as well as shock and was taken urgently to IR for stenting of the bleeding splenic artery aneurysm    Interval Problem Update  Patient seen and examined today.  Generalized weakness, on 2L O2, noted wheezing. Reports hx of asthma. Ordered albuterol, symbicort and solu medrol  CT abd and Ultrasound RUE reviewed, unchanged large hematoma in left abdomen. RUE DVT  noted.   I discussed with both surgery Dr. Davila and consulted oncology.   Per surgery, patient is poor surgical candidate, rec cont monitoring Hb. No evidence of abdominal compartment syndrome.   Discussed case with oncology, again unable to coagulate.     I have personally seen and examined the patient at bedside. I discussed the plan of care with patient, bedside RN and pharmacy.    Consultants/Specialty  general surgery and nephrology  Critical care  Hem/onc    Code Status  DNAR/DNI    Disposition  Patient is not medically cleared.   Anticipate discharge to to an inpatient rehabilitation hospital./LTAC  I have placed the appropriate orders for post-discharge needs.    Review of Systems  Review of Systems   Constitutional: Positive for malaise/fatigue. Negative for chills and fever.   HENT: Negative.    Eyes: Negative.    Respiratory: Negative.  Negative for cough.    Cardiovascular: Positive for leg swelling. Negative for chest pain and palpitations.   Gastrointestinal: Positive for abdominal pain and nausea. Negative for heartburn and vomiting.   Genitourinary: Negative.  Negative for dysuria and frequency.   Musculoskeletal: Positive for joint pain and myalgias. Negative for back pain and neck pain.   Skin: Negative.  Negative for itching and rash.   Neurological: Negative.  Negative for dizziness, focal weakness, weakness and headaches.   Endo/Heme/Allergies: Negative.  Negative for polydipsia. Does not bruise/bleed easily.   Psychiatric/Behavioral: Negative for depression. The patient is nervous/anxious.    All other systems reviewed and are negative.       Physical Exam  Temp:  [36 °C (96.8 °F)-36.7 °C (98.1 °F)] 36.7 °C (98.1 °F)  Pulse:  [] 100  Resp:  [16-23] 21  BP: (123-145)/() 123/75  SpO2:  [89 %-95 %] 89 %    Physical Exam  Vitals and nursing note reviewed.   Constitutional:       General: She is not in acute distress.     Appearance: She is obese. She is ill-appearing.   HENT:       Head: Normocephalic and atraumatic.      Nose: Nose normal. No rhinorrhea.      Mouth/Throat:      Pharynx: No oropharyngeal exudate or posterior oropharyngeal erythema.   Eyes:      General: No scleral icterus.        Right eye: No discharge.         Left eye: No discharge.   Cardiovascular:      Rate and Rhythm: Normal rate and regular rhythm.      Heart sounds: Normal heart sounds. No murmur heard.  No friction rub. No gallop.    Pulmonary:      Effort: Pulmonary effort is normal. No respiratory distress.      Breath sounds: No stridor. Decreased breath sounds, wheezing and rhonchi present.      Comments: On oxygen supplements  Chest:      Chest wall: No tenderness.   Abdominal:      General: Bowel sounds are normal. There is distension.      Palpations: Abdomen is soft. There is no mass.      Tenderness: There is abdominal tenderness. There is no rebound.   Musculoskeletal:         General: Swelling present. No tenderness.      Cervical back: Neck supple. No rigidity.   Skin:     General: Skin is warm and dry.      Coloration: Skin is not cyanotic or jaundiced.      Nails: There is no clubbing.   Neurological:      General: No focal deficit present.      Mental Status: She is alert and oriented to person, place, and time.      Cranial Nerves: No cranial nerve deficit.      Motor: Weakness (Generalized) present.   Psychiatric:         Mood and Affect: Mood normal.         Behavior: Behavior normal.         Fluids    Intake/Output Summary (Last 24 hours) at 12/26/2021 1231  Last data filed at 12/26/2021 0600  Gross per 24 hour   Intake 240 ml   Output 800 ml   Net -560 ml       Laboratory  Recent Labs     12/24/21  0230 12/24/21  0230 12/24/21  0916 12/25/21  0057 12/25/21  0911   WBC 15.6*  --   --  9.4 9.7   RBC 2.92*  --   --  2.40* 3.02*   HEMOGLOBIN 8.3*   < > 8.1* 6.8* 8.4*   HEMATOCRIT 26.2*   < > 26.1* 21.9* 26.6*   MCV 89.7  --   --  91.3 88.1   MCH 28.4  --   --  28.3 27.8   MCHC 31.7*  --   --  31.1*  31.6*   RDW 71.0*  --   --  73.9* 66.1*   PLATELETCT 333  --   --  330 303   MPV 10.5  --   --  10.6 10.4    < > = values in this interval not displayed.     Recent Labs     12/24/21  0230 12/25/21  0057   SODIUM 138 138   POTASSIUM 4.4 4.9   CHLORIDE 104 105   CO2 20 22   GLUCOSE 229* 144*   BUN 12 23*   CREATININE 0.78 1.25   CALCIUM 8.6 8.7                   Imaging  US-EXTREMITY VENOUS UPPER UNILAT RIGHT   Final Result      CTA ABDOMEN PELVIS W & W/O POST PROCESS   Final Result         1. Grossly unchanged large hematoma in the left abdomen.   2. No active contrast extravasation seen.   3. Retention of IV contrast in the kidneys from prior study concerning for contrast nephropathy.   4. Moderate left pleural effusion. Patchy bibasilar atelectasis.      IR-EMBOLIZATION   Final Result      Small pseudoaneurysm with bleeding arising from the splenic artery, successfully treated with covered stent               CT-CHEST,ABDOMEN,PELVIS WITH   Final Result      1.  Tiny focal contrast blush along the inferior margin of the dominant hematoma.   2.  A vessel cutoff along the posterior margin of the smaller hematoma, with a small adjacent 5 mm arterial aneurysm versus venous phlebolith. Either one of these may be the source of patient's bleeding.   3.  Hemoperitoneum.   4.  Stable moderate left effusion with associated atelectasis. Trace right effusion.   5.  Stable scattered groundglass opacities in the upper lobes, likely infectious/inflammatory. Active pneumonia should be considered.   6.  A 4 cm ascending aortic aneurysm.   7.  Findings of anemia.      CT-CHEST,ABDOMEN,PELVIS W/O   Final Result      1.  Large, 15 cm hematoma between the greater curvature of the excluded stomach and transverse colon. It is new since prior study.   2.  A smaller, 5.6 cm hematoma posterior to the gastric fundus, also new, possibly in the gastric fundus wall or at the bypass surgical staple line.   3.  Worsening hemoperitoneum.   4.   Slightly hyperdense moderate left effusion with associated atelectasis. Trace right effusion.   5.  Scattered groundglass opacities in the upper lobes, new since prior study, likely infectious/inflammatory. Active pneumonia should be considered.   6.  A 4 cm ascending aortic aneurysm.   7.  Findings of anemia.      Findings were discussed with Dr. Mirza on 12/23/2021 12:12 PM.      DX-FOOT-2- LEFT   Final Result      Soft tissue swelling without acute osseous abnormality.      DX-CHEST-PORTABLE (1 VIEW)   Final Result         1.  Mild edema and/or infiltrate, particularly on the right, stable since prior study   2.  Cardiomegaly      DX-CHEST-PORTABLE (1 VIEW)   Final Result         1.  Mild edema and/or infiltrate, particularly on the right   2.  Cardiomegaly      DX-CHEST-PORTABLE (1 VIEW)   Final Result         1.  Bibasilar atelectasis or early infiltrate   2.  Cardiomegaly      DX-CHEST-PORTABLE (1 VIEW)   Final Result      1.  Bibasilar underinflation atelectasis which could obscure an additional process. This is unchanged.   2.  Suspect small LEFT pleural effusion      DX-CHEST-PORTABLE (1 VIEW)   Final Result         1.  Bilateral basilar atelectasis, no focal infiltrate   2.  Cardiomegaly      DX-CHEST-PORTABLE (1 VIEW)   Final Result         1.  Bilateral basilar atelectasis, no focal infiltrate   2.  Cardiomegaly      DX-CHEST-PORTABLE (1 VIEW)   Final Result         1.  Left lower lobe atelectasis or infiltrates, similar compared to prior study.   2.  Cardiomegaly      DX-CHEST-PORTABLE (1 VIEW)   Final Result         1.  Left lower lobe atelectasis or infiltrates, similar compared to prior study.   2.  Cardiomegaly      DX-CHEST-PORTABLE (1 VIEW)   Final Result         1.  Left lower lobe atelectasis or infiltrates, similar compared to prior study.   2.  Cardiomegaly      IR-INSERT IVC FILTER WITH IG & SI   Final Result      Ultrasound and fluoroscopic guided placement of an Option Elite IVC filter.       DX-CHEST-PORTABLE (1 VIEW)   Final Result         1.  Pulmonary edema and/or infiltrates are identified, which are stable since the prior exam.   2.  Cardiomegaly      DX-CHEST-PORTABLE (1 VIEW)   Final Result      Tubes and lines as described above.      Bibasilar atelectasis with small pleural effusions.      DX-CHEST-PORTABLE (1 VIEW)   Final Result         1. No significant interval change.      [id number] VIEW   Final Result      Cortrak feeding tube tip projects in the region of the mid stomach.      DX-CHEST-PORTABLE (1 VIEW)   Final Result      Placement of right IJ dialysis catheter.      Otherwise stable findings.      DX-CHEST-PORTABLE (1 VIEW)   Final Result         1.  Pulmonary edema and/or infiltrates are identified, which are stable since the prior exam.   2.  Cardiomegaly      DX-CHEST-PORTABLE (1 VIEW)   Final Result         1.  Pulmonary edema and/or infiltrates are identified, which are stable since the prior exam.   2.  Cardiomegaly      DX-CHEST-FOR LINE PLACEMENT Perform procedure in: Patient's Room   Final Result      Interval placement of a left IJ central venous catheter with the tip overlying the right atrium.      US-EXTREMITY VENOUS LOWER BILAT   Final Result      CTA ABDOMEN PELVIS W & W/O POST PROCESS   Final Result      1.  Small to moderate amount of hemoperitoneum in the abdomen and pelvis. No active extravasation is identified.   2.  No aortic aneurysm or dissection.   3.  Peripancreatic stranding can be related to pancreatitis.   4.  Cardiomegaly.   5.  Hepatic steatosis.   6.  Status post cholecystectomy.   7.  Atrophic kidneys bilaterally.   8.  Status post gastric bypass surgery.   9.  Mild wall thickening of the second portion of the duodenum with surrounding inflammation. This can be seen in duodenitis/peptic ulcer disease.   10.  Colonic diverticulosis.   11.  Bibasilar atelectasis/consolidation. Groundglass opacities with septal thickening at the lung bases can be seen  in the setting of edema or multifocal pneumonia.   12.  Bilateral anterior rib fractures.      Findings discussed with Dr. Rush.      DX-CHEST-PORTABLE (1 VIEW)   Final Result      1.  Endotracheal tube present.      2.  Cardiomegaly with interstitial prominence.      EC-ECHOCARDIOGRAM COMPLETE W/ CONT   Final Result      CT-CTA CHEST PULMONARY ARTERY W/ RECONS   Final Result      1.  Positive for pulmonary embolism located in multiple segmental and subsegmental branches      2.  Minimal elevation of RV/LV ratio      3.  Mild atelectasis      4.  Left PICC line present and appears appropriately located      5.  Findings were discussed with BLAINE VEGA on 12/1/2021 5:35 PM.                  CT-ABDOMEN-PELVIS WITH   Final Result      1.  Apparent inflammation of the pancreas with surrounding fat stranding and fluid suggesting pancreatitis. Recommend correlation with lipase.      2.  Diverticulosis without diverticulitis.      3.  Hepatic steatosis.      US-EXTREMITY VENOUS UPPER UNILAT LEFT   Final Result      DX-CHEST-PORTABLE (1 VIEW)   Final Result      1.  No acute cardiopulmonary abnormality identified.      2.  Left PICC line appears appropriately located           Assessment/Plan  * Hemoperitoneum- (present on admission)  Assessment & Plan  In setting of acute pancreatitis in a pt on full dose anticoagulation  Worsened with new bleeding episode on 12/23  Of splenic artery aneurysm bleeding, status post stenting by IR 12/23  Surgery consulted, poor surgical candidate, cont monitoring Hb  Recent CT abd 12/25 shows unchanged large hematoma in the left abdomen  I discussed the finding with surgery on call Dr. Davila again on 12/26: no intervention at this point. Poor surgical candidate. Cont monitoring Hb. No sign of abdominal compartment syndrome    Adrenal insufficiency ([place]'s disease) (HCC)- (present on admission)  Assessment & Plan  On home regimen per her endocrinologist: hydrocortisone, dexamthasone,  florinef    Start tapering stress dose hydrocortisone, switched to hydrocortisone po home dose  On hold dexamethasone, change to iv solu medrol    DNR (do not resuscitate)  Assessment & Plan  Appreciated palliative consult, patient is DNR/DNI    Leukocytosis- (present on admission)  Assessment & Plan  Likely reactive  Procalcitonin mildly elevated patient with improving oxygen requirements and no other clinical signs of pneumonia  Continue close clinical monitoring off antibiotics for now    Hemorrhagic shock (HCC)- (present on admission)  Assessment & Plan  Occurred twice 12/2 and 12/23 while she was on anticoagulation. Status post massive transfusion protocol and speneic artery stenting on 12/23. Shock resolved  Continue close clinical monitoring  Contraindicated at this time given recurrent severe bleed    Splenic artery aneurysm (HCC)  Assessment & Plan  With hemorrhagic shock status post stenting on 12/23/2021  If hemoglobin remains stable may need aspirin and Plavix for stent patency per IR      Pulmonary hypertension (HCC)- (present on admission)  Assessment & Plan  ROSSI, Hx PE  Continue oxygen      Atelectasis  Assessment & Plan  Incentive spirometry, positional changes, alveolar recruiting    Paroxysmal atrial fibrillation (HCC)- (present on admission)  Assessment & Plan  Currently in sinus  Anticoagulation contraindicated at this time given recurrent intra-abdominal bleeding    DVT (deep venous thrombosis) ([facility])- (present on admission)  Assessment & Plan  DVT in left upper venous thrombosis including subclavian vein, axillary vein. DVT the left posterior tibial vein 12/1 and 12/2. She is also found to have DVT RUE in one of the paired mid-distal brachial vein 12/25.     Anticoagulation contraindicated at this time given recurrent severe bleed  S/p IVC filter 12/6    Cardiac arrest (HCC)- (present on admission)  Assessment & Plan  Secondary to hemorrhagic shock on 12/2 at ED, CPR was per formed, she was  intubated, she was resuscitated with massive transfusion protocol. Patient was extubated on 12/13    Echo 12/2: The left ventricular ejection fraction is visually estimated to be 60%. The right ventricle is dilated. Reduced right ventricular systolic function.    Acute pancreatitis- (present on admission)  Assessment & Plan  resolved    PE (pulmonary thromboembolism) (HCC)- (present on admission)  Assessment & Plan  Hx of PE 4/2021 status post 3 months of eliquis, which was stopped due to GI bleeding.   Recurrent PE on admission per CTA, s/p anticoagulation attempts, given recurrent bleeding anticoagulation discontinued and is contraindicated at this time  Patient underwent IVC placement 12/6  Given recurrent PE and DVT, I consulted hem/onc    Debilitated patient  Assessment & Plan  PT/OT  Needs Rehab however at least at this point would not tolerate 3 hrs of daily therapy  LTAC referral    Acute blood loss anemia- (present on admission)  Assessment & Plan  Prior hemoperitoneum complicated with cardiac arrest. S/p multiple transfusions  Status post  arteriogram SMA and celiac and splenic 6 x 38 mm atrium covered stent in splenic artery due to splenic artery pseudoaneurysm with bleeding 12/23  The patient does not appear to be a candidate for further anticoagulation unfortunately    History of pulmonary embolus (PE)- (present on admission)  Assessment & Plan  Hx of PE 4/2021    MRSA and E. coli sinusitis- (present on admission)  Assessment & Plan  Completed 6 weeks of IV ceftriaxone and vancomycin on 12/21/2021    Acute kidney injury (HCC)- (present on admission)  Assessment & Plan  Renal function has since normalized, but the patient now given multiple doses of IV contrast, monitor closely      Hypothyroidism- (present on admission)  Assessment & Plan  Continue thyroid replacement therapy    Asthma- (present on admission)  Assessment & Plan  Hx of, noted wheezing on exam  Cont albuterol, symbicort and  montelukast  On hold decadron, start solu medrol    Obesity (BMI 35.0-39.9 without comorbidity)- (present on admission)  Assessment & Plan  For future weight management    Acute respiratory failure with hypoxia (HCC)- (present on admission)  Assessment & Plan  Intubated due to hemorrhagic shock  Extubated 12/13    RT protocol  Aspiration precautions  Wean O2 as tolerated       VTE prophylaxis: SCD    I have performed a physical exam and reviewed and updated ROS and Plan today (12/26/2021). In review of yesterday's note (12/25/2021), there are no changes except as documented above.

## 2021-12-26 NOTE — CARE PLAN
The patient is Stable - Low risk of patient condition declining or worsening    Shift Goals  Clinical Goals: pt's pain will be managed throughout shift    Progress made toward(s) clinical / shift goals:  pt medicated with oxycodone for abdominal pain.    Patient is not progressing towards the following goals:

## 2021-12-27 LAB
ALBUMIN SERPL BCP-MCNC: 2.5 G/DL (ref 3.2–4.9)
ALBUMIN/GLOB SERPL: 0.8 G/DL
ALP SERPL-CCNC: 171 U/L (ref 30–99)
ALT SERPL-CCNC: 19 U/L (ref 2–50)
ANION GAP SERPL CALC-SCNC: 11 MMOL/L (ref 7–16)
ANISOCYTOSIS BLD QL SMEAR: ABNORMAL
AST SERPL-CCNC: 26 U/L (ref 12–45)
BASOPHILS # BLD AUTO: 0 % (ref 0–1.8)
BASOPHILS # BLD: 0 K/UL (ref 0–0.12)
BILIRUB SERPL-MCNC: 0.6 MG/DL (ref 0.1–1.5)
BUN SERPL-MCNC: 27 MG/DL (ref 8–22)
CALCIUM SERPL-MCNC: 8.7 MG/DL (ref 8.5–10.5)
CHLORIDE SERPL-SCNC: 104 MMOL/L (ref 96–112)
CO2 SERPL-SCNC: 24 MMOL/L (ref 20–33)
CREAT SERPL-MCNC: 0.95 MG/DL (ref 0.5–1.4)
EOSINOPHIL # BLD AUTO: 0 K/UL (ref 0–0.51)
EOSINOPHIL NFR BLD: 0 % (ref 0–6.9)
ERYTHROCYTE [DISTWIDTH] IN BLOOD BY AUTOMATED COUNT: 67.5 FL (ref 35.9–50)
GLOBULIN SER CALC-MCNC: 3.1 G/DL (ref 1.9–3.5)
GLUCOSE BLD-MCNC: 142 MG/DL (ref 65–99)
GLUCOSE BLD-MCNC: 145 MG/DL (ref 65–99)
GLUCOSE BLD-MCNC: 155 MG/DL (ref 65–99)
GLUCOSE BLD-MCNC: 157 MG/DL (ref 65–99)
GLUCOSE SERPL-MCNC: 140 MG/DL (ref 65–99)
HCT VFR BLD AUTO: 25.6 % (ref 37–47)
HCT VFR BLD CALC: 21 % (ref 37–47)
HGB BLD-MCNC: 7.1 G/DL (ref 12–16)
HGB BLD-MCNC: 7.9 G/DL (ref 12–16)
HYPOCHROMIA BLD QL SMEAR: ABNORMAL
INR PPP: 1.17 (ref 0.87–1.13)
LYMPHOCYTES # BLD AUTO: 0.23 K/UL (ref 1–4.8)
LYMPHOCYTES NFR BLD: 1.7 % (ref 22–41)
MACROCYTES BLD QL SMEAR: ABNORMAL
MAGNESIUM SERPL-MCNC: 1.8 MG/DL (ref 1.5–2.5)
MANUAL DIFF BLD: NORMAL
MCH RBC QN AUTO: 27.7 PG (ref 27–33)
MCHC RBC AUTO-ENTMCNC: 30.9 G/DL (ref 33.6–35)
MCV RBC AUTO: 89.8 FL (ref 81.4–97.8)
METAMYELOCYTES NFR BLD MANUAL: 0.9 %
MICROCYTES BLD QL SMEAR: ABNORMAL
MONOCYTES # BLD AUTO: 0.59 K/UL (ref 0–0.85)
MONOCYTES NFR BLD AUTO: 4.4 % (ref 0–13.4)
MORPHOLOGY BLD-IMP: NORMAL
MYELOCYTES NFR BLD MANUAL: 0.9 %
NEUTROPHILS # BLD AUTO: 12.34 K/UL (ref 2–7.15)
NEUTROPHILS NFR BLD: 92.1 % (ref 44–72)
NRBC # BLD AUTO: 0.31 K/UL
NRBC BLD-RTO: 2.3 /100 WBC
PHOSPHATE SERPL-MCNC: 5 MG/DL (ref 2.5–4.5)
PLATELET # BLD AUTO: 337 K/UL (ref 164–446)
PLATELET BLD QL SMEAR: NORMAL
PMV BLD AUTO: 10.2 FL (ref 9–12.9)
POLYCHROMASIA BLD QL SMEAR: NORMAL
POTASSIUM SERPL-SCNC: 4.9 MMOL/L (ref 3.6–5.5)
PROCALCITONIN SERPL-MCNC: 1.06 NG/ML
PROT SERPL-MCNC: 5.6 G/DL (ref 6–8.2)
PROTHROMBIN TIME: 14.6 SEC (ref 12–14.6)
RBC # BLD AUTO: 2.85 M/UL (ref 4.2–5.4)
RBC BLD AUTO: PRESENT
SODIUM SERPL-SCNC: 139 MMOL/L (ref 135–145)
WBC # BLD AUTO: 13.4 K/UL (ref 4.8–10.8)

## 2021-12-27 PROCEDURE — A9270 NON-COVERED ITEM OR SERVICE: HCPCS | Performed by: HOSPITALIST

## 2021-12-27 PROCEDURE — 85027 COMPLETE CBC AUTOMATED: CPT

## 2021-12-27 PROCEDURE — 94640 AIRWAY INHALATION TREATMENT: CPT

## 2021-12-27 PROCEDURE — A9270 NON-COVERED ITEM OR SERVICE: HCPCS | Performed by: STUDENT IN AN ORGANIZED HEALTH CARE EDUCATION/TRAINING PROGRAM

## 2021-12-27 PROCEDURE — 83735 ASSAY OF MAGNESIUM: CPT

## 2021-12-27 PROCEDURE — 770006 HCHG ROOM/CARE - MED/SURG/GYN SEMI*

## 2021-12-27 PROCEDURE — 700111 HCHG RX REV CODE 636 W/ 250 OVERRIDE (IP): Performed by: STUDENT IN AN ORGANIZED HEALTH CARE EDUCATION/TRAINING PROGRAM

## 2021-12-27 PROCEDURE — 700102 HCHG RX REV CODE 250 W/ 637 OVERRIDE(OP): Performed by: INTERNAL MEDICINE

## 2021-12-27 PROCEDURE — 82962 GLUCOSE BLOOD TEST: CPT | Mod: 91

## 2021-12-27 PROCEDURE — 36415 COLL VENOUS BLD VENIPUNCTURE: CPT

## 2021-12-27 PROCEDURE — 700101 HCHG RX REV CODE 250: Performed by: INTERNAL MEDICINE

## 2021-12-27 PROCEDURE — 85610 PROTHROMBIN TIME: CPT

## 2021-12-27 PROCEDURE — 84100 ASSAY OF PHOSPHORUS: CPT

## 2021-12-27 PROCEDURE — 85007 BL SMEAR W/DIFF WBC COUNT: CPT

## 2021-12-27 PROCEDURE — 700111 HCHG RX REV CODE 636 W/ 250 OVERRIDE (IP): Performed by: HOSPITALIST

## 2021-12-27 PROCEDURE — 86147 CARDIOLIPIN ANTIBODY EA IG: CPT

## 2021-12-27 PROCEDURE — 84145 PROCALCITONIN (PCT): CPT

## 2021-12-27 PROCEDURE — 80053 COMPREHEN METABOLIC PANEL: CPT

## 2021-12-27 PROCEDURE — 700102 HCHG RX REV CODE 250 W/ 637 OVERRIDE(OP): Performed by: HOSPITALIST

## 2021-12-27 PROCEDURE — 99232 SBSQ HOSP IP/OBS MODERATE 35: CPT | Performed by: INTERNAL MEDICINE

## 2021-12-27 PROCEDURE — A9270 NON-COVERED ITEM OR SERVICE: HCPCS | Performed by: INTERNAL MEDICINE

## 2021-12-27 PROCEDURE — 700102 HCHG RX REV CODE 250 W/ 637 OVERRIDE(OP): Performed by: STUDENT IN AN ORGANIZED HEALTH CARE EDUCATION/TRAINING PROGRAM

## 2021-12-27 PROCEDURE — 700101 HCHG RX REV CODE 250: Performed by: STUDENT IN AN ORGANIZED HEALTH CARE EDUCATION/TRAINING PROGRAM

## 2021-12-27 RX ADMIN — HYDROCORTISONE 20 MG: 10 TABLET ORAL at 05:59

## 2021-12-27 RX ADMIN — DOCUSATE SODIUM 50 MG AND SENNOSIDES 8.6 MG 2 TABLET: 8.6; 5 TABLET, FILM COATED ORAL at 05:59

## 2021-12-27 RX ADMIN — METHYLPREDNISOLONE SODIUM SUCCINATE 40 MG: 40 INJECTION, POWDER, FOR SOLUTION INTRAMUSCULAR; INTRAVENOUS at 21:23

## 2021-12-27 RX ADMIN — LIOTHYRONINE SODIUM 25 MCG: 25 TABLET ORAL at 06:00

## 2021-12-27 RX ADMIN — FLUDROCORTISONE ACETATE 0.2 MG: 0.1 TABLET ORAL at 16:45

## 2021-12-27 RX ADMIN — INSULIN HUMAN 5 UNITS: 100 INJECTION, SUSPENSION SUBCUTANEOUS at 06:29

## 2021-12-27 RX ADMIN — ALBUTEROL SULFATE 2.5 MG: 2.5 SOLUTION RESPIRATORY (INHALATION) at 14:14

## 2021-12-27 RX ADMIN — GABAPENTIN 200 MG: 100 CAPSULE ORAL at 06:32

## 2021-12-27 RX ADMIN — LIDOCAINE 1 PATCH: 50 PATCH TOPICAL at 07:57

## 2021-12-27 RX ADMIN — MONTELUKAST 10 MG: 10 TABLET, FILM COATED ORAL at 16:45

## 2021-12-27 RX ADMIN — ALBUTEROL SULFATE 2.5 MG: 2.5 SOLUTION RESPIRATORY (INHALATION) at 20:29

## 2021-12-27 RX ADMIN — GABAPENTIN 200 MG: 100 CAPSULE ORAL at 21:48

## 2021-12-27 RX ADMIN — INSULIN HUMAN 5 UNITS: 100 INJECTION, SUSPENSION SUBCUTANEOUS at 16:50

## 2021-12-27 RX ADMIN — ACETAMINOPHEN 500 MG: 500 TABLET ORAL at 07:57

## 2021-12-27 RX ADMIN — ALBUTEROL SULFATE 2.5 MG: 2.5 SOLUTION RESPIRATORY (INHALATION) at 07:06

## 2021-12-27 RX ADMIN — OMEPRAZOLE 20 MG: 20 CAPSULE, DELAYED RELEASE ORAL at 05:59

## 2021-12-27 RX ADMIN — HYDROCORTISONE 10 MG: 10 TABLET ORAL at 16:45

## 2021-12-27 RX ADMIN — GABAPENTIN 200 MG: 100 CAPSULE ORAL at 14:38

## 2021-12-27 RX ADMIN — BUDESONIDE AND FORMOTEROL FUMARATE DIHYDRATE 2 PUFF: 160; 4.5 AEROSOL RESPIRATORY (INHALATION) at 08:00

## 2021-12-27 RX ADMIN — METHYLPREDNISOLONE SODIUM SUCCINATE 40 MG: 40 INJECTION, POWDER, FOR SOLUTION INTRAMUSCULAR; INTRAVENOUS at 06:00

## 2021-12-27 RX ADMIN — ALBUTEROL SULFATE 2.5 MG: 2.5 SOLUTION RESPIRATORY (INHALATION) at 10:43

## 2021-12-27 RX ADMIN — LEVOTHYROXINE SODIUM 75 MCG: 0.07 TABLET ORAL at 06:00

## 2021-12-27 RX ADMIN — OXYCODONE HYDROCHLORIDE 10 MG: 10 TABLET ORAL at 05:59

## 2021-12-27 RX ADMIN — ACETAMINOPHEN 500 MG: 500 TABLET ORAL at 21:21

## 2021-12-27 RX ADMIN — BUDESONIDE AND FORMOTEROL FUMARATE DIHYDRATE 2 PUFF: 160; 4.5 AEROSOL RESPIRATORY (INHALATION) at 20:29

## 2021-12-27 RX ADMIN — ACETAMINOPHEN 500 MG: 500 TABLET ORAL at 14:38

## 2021-12-27 RX ADMIN — DULOXETINE HYDROCHLORIDE 60 MG: 60 CAPSULE, DELAYED RELEASE ORAL at 21:21

## 2021-12-27 RX ADMIN — FLUDROCORTISONE ACETATE 0.2 MG: 0.1 TABLET ORAL at 05:59

## 2021-12-27 RX ADMIN — INSULIN HUMAN 3 UNITS: 100 INJECTION, SOLUTION PARENTERAL at 21:48

## 2021-12-27 RX ADMIN — METHYLPREDNISOLONE SODIUM SUCCINATE 40 MG: 40 INJECTION, POWDER, FOR SOLUTION INTRAMUSCULAR; INTRAVENOUS at 14:38

## 2021-12-27 RX ADMIN — HYDROMORPHONE HYDROCHLORIDE 1 MG: 1 INJECTION, SOLUTION INTRAMUSCULAR; INTRAVENOUS; SUBCUTANEOUS at 07:57

## 2021-12-27 RX ADMIN — OXYCODONE HYDROCHLORIDE 10 MG: 10 TABLET ORAL at 14:46

## 2021-12-27 RX ADMIN — LABETALOL HYDROCHLORIDE 10 MG: 5 INJECTION, SOLUTION INTRAVENOUS at 21:52

## 2021-12-27 ASSESSMENT — PAIN DESCRIPTION - PAIN TYPE
TYPE: ACUTE PAIN

## 2021-12-27 NOTE — CARE PLAN
Problem: Hyperinflation  Goal: Prevent or improve atelectasis  Description: 1. Instruct incentive spirometry usage  2.  Perform hyperinflation therapy as indicated  12/26/2021 1737 by Kimberly Telles RRT  Outcome: Progressing  Pt may need to restart pep      Problem: Bronchoconstriction  Goal: Improve in air movement and diminished wheezing  Description: 1.  Implement inhaled treatments  2.  Evaluate and manage medication effects  Outcome: Progressing     Started Albuterol QID

## 2021-12-27 NOTE — PROGRESS NOTES
Pt alert/oriented x4, no report of pain at this time. Medications taken without difficulty. Perez catheter patent. Pt resting quietly in bed watching television. Call light within reach, personal belongings available, bed in lowest position, treaded socks on, and bed alarm on. Hourly rounding in place.

## 2021-12-27 NOTE — DIETARY
Brief Nutrition Services Update: PO intake not yet meeting goal, pt only taking bites of meals/sips of liquids. Family at bedside who report pt does has not been drinking Boost  as she is lactose intolerant and avoids Milk (Does eat cheese). Discussed that our Boost supplements are lactose free and provided alternative options to regular Boost. Pt not receptive to answering questions, appears to be in pain, family wishes to try Boost Breeze and Boost Plus to see which pt likes better.    · Pt with Hemoperitoneum with new bleeding episode and stent placement on 12/23. Per MD notes on 12/26: no intervention at this point. Poor surgical candidate. Cont monitoring Hb. No sign of abdominal compartment syndrome  · Diet= Soft and bite sized with thin liquids, Boost Glucose supplements in place.   · Pertinent Labs: PHos 5.0.- Pt with minimal intake at this time, do not feel Renal diet restrictions necessary at this time.       Plan/Rec:   1) Will send Boost Breeze with Breakfast and Lunch and Boost Glucose Control with Dinner meal to see which pt tolerates best.   2) Monitor Renal labs and add Renal diet restriction PRN and appropriate with pt's PO Intake.   3) Consider phosphate binders if phos remains elevated.     RD following.

## 2021-12-27 NOTE — CARE PLAN
The patient is Stable - Low risk of patient condition declining or worsening    Shift Goals  Clinical Goals: pt's pain will be managed throughout shift    Progress made toward(s) clinical / shift goals:  pain assessment, medicated pe MAR.     Patient is not progressing towards the following goals:

## 2021-12-27 NOTE — DISCHARGE PLANNING
Received Choice form at 1110  Agency/Facility Name: [facility]      1345-  Agency/Facility Name: [facility]  Outcome: DPA sent referral for Hospice per RN CM instruction at 1345.

## 2021-12-27 NOTE — DISCHARGE PLANNING
Anticipated Discharge Disposition: home with hospice    Action: Reviewed in IDT rounds. Per Palliative nurse, pt wants home on hospice. Palliative nurse completed choice for [facility] and faxed to FAIZA Huerta, to send referral.  Await their acceptance.   Pt’s  has some questions and Dr Rodriguez to review with .     Barriers to Discharge: await hospice    Plan: referral in progress to [facility].

## 2021-12-27 NOTE — PROGRESS NOTES
[facility] Medicine Daily Progress Note    Date of Service  12/27/2021    Chief Complaint  Donna Isaac is a 56 y.o. female admitted 12/1/2021 with abd/chest pain    Hospital Course  Ms Isaac has a complicated past medical history that includes traumatic brain injury, congestive heart failure and Valentín's disease and PE 4/2021 completed 3 months of eliquis.  She presented the emergency room on 12/1/2021 with complaints of abdominal pain.  Patient was found to have a pulmonary embolism and acute pancreatitis.  Patient started on heparin drip and admitted to the hospital, she subsequently developed hemorrhagic shock,  she was started on pressors and orders were changed to admit to the ICU.  While waiting for transfer the patient had cardiac arrest in the emergency room, CPR was per formed, she was intubated, she was resuscitated with massive transfusion protocol. CTA abd noted Small to moderate amount of hemoperitoneum in the abdomen and pelvis. Surgery was consulted, and deemed to be a poor surgical candidate, cont monitoring Hb.     Patient has been supported with ventilator, and IVC filter has been placed 12/6 , her renal function has improved and she was extubated on 12/13/2021.    The patient was diagnosed with left upper venous thrombosis including subclavian vein, axillary vein, acute deep vein thrombosis in the left posterior tibial vein 12/1 and 12/2. She is also found to have DVT RUE in one of the paired mid-distal brachial vein 12/25. Lovenox was resumed on 12/18, which was stopped on 12/23 due to development of splenic artery aneurysm.  She was taken urgently to IR for stenting.  CT of the abdomen and ultrasound of the right upper extremity showed unchanged large hematoma in the left abdomen, and right upper extremity DVT.  She had no evidence of abdominal compartment syndrome.  Oncology was consulted.    Interval Problem Update  12/27/2021 - I reviewed the patient's chart. There were no  significant overnight events. Remains hemodynamically stable with blood pressure elevated at 164/107.  Afebrile. Stable on 2L O2 NC.  Hemoglobin 7.9.  WBC 13.4.  Electrolytes and renal function are normal.  LFTs are normal, except for evaded alkaline phosphatase of 171.  Oncology gave the recommendation that we cannot anticoagulate her.    > I have personally seen and examined the patient today.  Discussed with , and patient, and they would like to pursue hospice services at home.  Otherwise she is comfortable, and denies any pain, and she is not in distress.  No nausea, vomiting, abdominal pain.      I have personally seen and examined the patient at bedside. I discussed the plan of care with patient, bedside RN and pharmacy.    Consultants/Specialty  general surgery and nephrology  Critical care  Hem/onc    Code Status  DNAR/DNI    Disposition  Patient is medically cleared.   Anticipate discharge to to hospice. Plan for home hospice.   I have placed the appropriate orders for post-discharge needs.    Review of Systems  Review of Systems      Pertinent positives/negatives as mentioned above.     A complete review of systems was personally done by me. All other systems were negative.         Physical Exam  Temp:  [36.3 °C (97.3 °F)-36.5 °C (97.7 °F)] 36.3 °C (97.3 °F)  Pulse:  [] 102  Resp:  [16-20] 18  BP: (151-169)/() 152/97  SpO2:  [91 %-95 %] 93 %    Physical Exam  Vitals and nursing note reviewed.   Constitutional:       General: She is not in acute distress.     Appearance: She is obese. She is ill-appearing.      Comments: Body mass index is 39.13 kg/m².   HENT:      Head: Normocephalic and atraumatic.      Nose: Nose normal. No rhinorrhea.      Mouth/Throat:      Pharynx: No oropharyngeal exudate or posterior oropharyngeal erythema.   Eyes:      General: No scleral icterus.        Right eye: No discharge.         Left eye: No discharge.   Cardiovascular:      Rate and Rhythm: Normal rate  and regular rhythm.      Heart sounds: Normal heart sounds. No murmur heard.  No friction rub. No gallop.    Pulmonary:      Effort: Pulmonary effort is normal. No respiratory distress.      Breath sounds: No stridor. Decreased breath sounds, wheezing and rhonchi present.      Comments: Diminished air entry B/L bases, with occasional bilateral rales.  Chest:      Chest wall: No tenderness.   Abdominal:      General: Bowel sounds are normal. There is no distension.      Palpations: Abdomen is soft. There is no mass.      Tenderness: There is abdominal tenderness. There is no rebound.   Musculoskeletal:         General: Swelling present. No tenderness.      Cervical back: Neck supple. No rigidity.   Skin:     General: Skin is warm and dry.      Coloration: Skin is not cyanotic or jaundiced.      Nails: There is no clubbing.   Neurological:      General: No focal deficit present.      Mental Status: She is alert and oriented to person, place, and time.      Cranial Nerves: No cranial nerve deficit.      Motor: Weakness (Generalized) present.      Comments: Lethargic, but arousable.  Answers questions.   Psychiatric:         Mood and Affect: Mood normal.         Behavior: Behavior normal.         Fluids    Intake/Output Summary (Last 24 hours) at 12/27/2021 1259  Last data filed at 12/27/2021 1253  Gross per 24 hour   Intake 360 ml   Output 1400 ml   Net -1040 ml       Laboratory  Recent Labs     12/25/21  0911 12/26/21  1222 12/27/21  0505   WBC 9.7 13.9* 13.4*   RBC 3.02* 3.04* 2.85*   HEMOGLOBIN 8.4* 8.2* 7.9*   HEMATOCRIT 26.6* 27.8* 25.6*   MCV 88.1 91.4 89.8   MCH 27.8 27.0 27.7   MCHC 31.6* 29.5* 30.9*   RDW 66.1* 70.9* 67.5*   PLATELETCT 303 330 337   MPV 10.4 9.9 10.2     Recent Labs     12/25/21  0057 12/26/21  1222 12/27/21  0505   SODIUM 138 135 139   POTASSIUM 4.9 4.7 4.9   CHLORIDE 105 101 104   CO2 22 23 24   GLUCOSE 144* 119* 140*   BUN 23* 30* 27*   CREATININE 1.25 1.14 0.95   CALCIUM 8.7 8.9 8.7      Recent Labs     12/27/21  0505   INR 1.17*               Imaging  US-EXTREMITY VENOUS UPPER UNILAT RIGHT   Final Result      CTA ABDOMEN PELVIS W & W/O POST PROCESS   Final Result         1. Grossly unchanged large hematoma in the left abdomen.   2. No active contrast extravasation seen.   3. Retention of IV contrast in the kidneys from prior study concerning for contrast nephropathy.   4. Moderate left pleural effusion. Patchy bibasilar atelectasis.      IR-EMBOLIZATION   Final Result      Small pseudoaneurysm with bleeding arising from the splenic artery, successfully treated with covered stent               CT-CHEST,ABDOMEN,PELVIS WITH   Final Result      1.  Tiny focal contrast blush along the inferior margin of the dominant hematoma.   2.  A vessel cutoff along the posterior margin of the smaller hematoma, with a small adjacent 5 mm arterial aneurysm versus venous phlebolith. Either one of these may be the source of patient's bleeding.   3.  Hemoperitoneum.   4.  Stable moderate left effusion with associated atelectasis. Trace right effusion.   5.  Stable scattered groundglass opacities in the upper lobes, likely infectious/inflammatory. Active pneumonia should be considered.   6.  A 4 cm ascending aortic aneurysm.   7.  Findings of anemia.      CT-CHEST,ABDOMEN,PELVIS W/O   Final Result      1.  Large, 15 cm hematoma between the greater curvature of the excluded stomach and transverse colon. It is new since prior study.   2.  A smaller, 5.6 cm hematoma posterior to the gastric fundus, also new, possibly in the gastric fundus wall or at the bypass surgical staple line.   3.  Worsening hemoperitoneum.   4.  Slightly hyperdense moderate left effusion with associated atelectasis. Trace right effusion.   5.  Scattered groundglass opacities in the upper lobes, new since prior study, likely infectious/inflammatory. Active pneumonia should be considered.   6.  A 4 cm ascending aortic aneurysm.   7.  Findings of  anemia.      Findings were discussed with Dr. Mirza on 12/23/2021 12:12 PM.      DX-FOOT-2- LEFT   Final Result      Soft tissue swelling without acute osseous abnormality.      DX-CHEST-PORTABLE (1 VIEW)   Final Result         1.  Mild edema and/or infiltrate, particularly on the right, stable since prior study   2.  Cardiomegaly      DX-CHEST-PORTABLE (1 VIEW)   Final Result         1.  Mild edema and/or infiltrate, particularly on the right   2.  Cardiomegaly      DX-CHEST-PORTABLE (1 VIEW)   Final Result         1.  Bibasilar atelectasis or early infiltrate   2.  Cardiomegaly      DX-CHEST-PORTABLE (1 VIEW)   Final Result      1.  Bibasilar underinflation atelectasis which could obscure an additional process. This is unchanged.   2.  Suspect small LEFT pleural effusion      DX-CHEST-PORTABLE (1 VIEW)   Final Result         1.  Bilateral basilar atelectasis, no focal infiltrate   2.  Cardiomegaly      DX-CHEST-PORTABLE (1 VIEW)   Final Result         1.  Bilateral basilar atelectasis, no focal infiltrate   2.  Cardiomegaly      DX-CHEST-PORTABLE (1 VIEW)   Final Result         1.  Left lower lobe atelectasis or infiltrates, similar compared to prior study.   2.  Cardiomegaly      DX-CHEST-PORTABLE (1 VIEW)   Final Result         1.  Left lower lobe atelectasis or infiltrates, similar compared to prior study.   2.  Cardiomegaly      DX-CHEST-PORTABLE (1 VIEW)   Final Result         1.  Left lower lobe atelectasis or infiltrates, similar compared to prior study.   2.  Cardiomegaly      IR-INSERT IVC FILTER WITH IG & SI   Final Result      Ultrasound and fluoroscopic guided placement of an Option Elite IVC filter.      DX-CHEST-PORTABLE (1 VIEW)   Final Result         1.  Pulmonary edema and/or infiltrates are identified, which are stable since the prior exam.   2.  Cardiomegaly      DX-CHEST-PORTABLE (1 VIEW)   Final Result      Tubes and lines as described above.      Bibasilar atelectasis with small pleural  effusions.      DX-CHEST-PORTABLE (1 VIEW)   Final Result         1. No significant interval change.      [id number] VIEW   Final Result      Cortrak feeding tube tip projects in the region of the mid stomach.      DX-CHEST-PORTABLE (1 VIEW)   Final Result      Placement of right IJ dialysis catheter.      Otherwise stable findings.      DX-CHEST-PORTABLE (1 VIEW)   Final Result         1.  Pulmonary edema and/or infiltrates are identified, which are stable since the prior exam.   2.  Cardiomegaly      DX-CHEST-PORTABLE (1 VIEW)   Final Result         1.  Pulmonary edema and/or infiltrates are identified, which are stable since the prior exam.   2.  Cardiomegaly      DX-CHEST-FOR LINE PLACEMENT Perform procedure in: Patient's Room   Final Result      Interval placement of a left IJ central venous catheter with the tip overlying the right atrium.      US-EXTREMITY VENOUS LOWER BILAT   Final Result      CTA ABDOMEN PELVIS W & W/O POST PROCESS   Final Result      1.  Small to moderate amount of hemoperitoneum in the abdomen and pelvis. No active extravasation is identified.   2.  No aortic aneurysm or dissection.   3.  Peripancreatic stranding can be related to pancreatitis.   4.  Cardiomegaly.   5.  Hepatic steatosis.   6.  Status post cholecystectomy.   7.  Atrophic kidneys bilaterally.   8.  Status post gastric bypass surgery.   9.  Mild wall thickening of the second portion of the duodenum with surrounding inflammation. This can be seen in duodenitis/peptic ulcer disease.   10.  Colonic diverticulosis.   11.  Bibasilar atelectasis/consolidation. Groundglass opacities with septal thickening at the lung bases can be seen in the setting of edema or multifocal pneumonia.   12.  Bilateral anterior rib fractures.      Findings discussed with Dr. Rush.      DX-CHEST-PORTABLE (1 VIEW)   Final Result      1.  Endotracheal tube present.      2.  Cardiomegaly with interstitial prominence.      EC-ECHOCARDIOGRAM COMPLETE  W/ CONT   Final Result      CT-CTA CHEST PULMONARY ARTERY W/ RECONS   Final Result      1.  Positive for pulmonary embolism located in multiple segmental and subsegmental branches      2.  Minimal elevation of RV/LV ratio      3.  Mild atelectasis      4.  Left PICC line present and appears appropriately located      5.  Findings were discussed with BLAINE VEGA on 12/1/2021 5:35 PM.                  CT-ABDOMEN-PELVIS WITH   Final Result      1.  Apparent inflammation of the pancreas with surrounding fat stranding and fluid suggesting pancreatitis. Recommend correlation with lipase.      2.  Diverticulosis without diverticulitis.      3.  Hepatic steatosis.      US-EXTREMITY VENOUS UPPER UNILAT LEFT   Final Result      DX-CHEST-PORTABLE (1 VIEW)   Final Result      1.  No acute cardiopulmonary abnormality identified.      2.  Left PICC line appears appropriately located           Assessment/Plan  * Hemoperitoneum- (present on admission)  Assessment & Plan  In setting of acute pancreatitis in a pt on full dose anticoagulation  Worsened with new bleeding episode on 12/23  Of splenic artery aneurysm bleeding, status post stenting by IR 12/23  Surgery consulted, poor surgical candidate, cont monitoring Hb  Recent CT abd 12/25 shows unchanged large hematoma in the left abdomen  Per surgery, no intervention at this point. Poor surgical candidate. Contine monitoring Hb. No sign of abdominal compartment syndrome    DNR (do not resuscitate)  Assessment & Plan  Appreciated palliative consult, patient is DNR/DNI, and now desiring hospice    Leukocytosis- (present on admission)  Assessment & Plan  Likely reactive  Procalcitonin mildly elevated patient with improving oxygen requirements and no other clinical signs of pneumonia  Continue close clinical monitoring off antibiotics for now    Hemorrhagic shock (HCC)- (present on admission)  Assessment & Plan  Occurred twice 12/2 and 12/23 while she was on anticoagulation. Status  post massive transfusion protocol and speneic artery stenting on 12/23. Shock resolved  Continue close clinical monitoring  Contraindicated at this time given recurrent severe bleed    Splenic artery aneurysm (HCC)  Assessment & Plan  With hemorrhagic shock status post stenting on 12/23/2021  If hemoglobin remains stable may need aspirin and Plavix for stent patency per IR      Pulmonary hypertension (HCC)- (present on admission)  Assessment & Plan  ROSSI, Hx PE  Continue oxygen      Atelectasis  Assessment & Plan  Continue incentive spirometry, positional changes, alveolar recruiting    Paroxysmal atrial fibrillation (HCC)- (present on admission)  Assessment & Plan  Currently in sinus  Anticoagulation contraindicated at this time given recurrent intra-abdominal bleeding    DVT (deep venous thrombosis) ([facility])- (present on admission)  Assessment & Plan  DVT in left upper venous thrombosis including subclavian vein, axillary vein. DVT the left posterior tibial vein 12/1 and 12/2. She is also found to have DVT RUE in one of the paired mid-distal brachial vein 12/25.   Anticoagulation contraindicated at this time given recurrent severe bleed  S/p IVC filter 12/6    Cardiac arrest ([facility])- (present on admission)  Assessment & Plan  Secondary to hemorrhagic shock on 12/2 at ED, CPR was per formed, she was intubated, she was resuscitated with massive transfusion protocol. Patient was extubated on 12/13  Echo 12/2: The left ventricular ejection fraction is visually estimated to be 60%. The right ventricle is dilated. Reduced right ventricular systolic function.    Acute pancreatitis- (present on admission)  Assessment & Plan  resolved    PE (pulmonary thromboembolism) (HCC)- (present on admission)  Assessment & Plan  Hx of PE 4/2021 status post 3 months of eliquis, which was stopped due to GI bleeding.   Recurrent PE on admission per CTA, s/p anticoagulation attempts, given recurrent bleeding anticoagulation discontinued and is  contraindicated at this time  Patient underwent IVC placement 12/6  Not a candidate for further anticoagulation due to bleeding    Debilitated patient  Assessment & Plan  Patient and  now opting for home hospice.  Hospice referral placed.  Discussed with CM/CUONG for discharge planning.    Acute blood loss anemia- (present on admission)  Assessment & Plan  Prior hemoperitoneum complicated with cardiac arrest. S/p multiple transfusions  Status post  arteriogram SMA and celiac and splenic 6 x 38 mm atrium covered stent in splenic artery due to splenic artery pseudoaneurysm with bleeding 12/23  The patient does not appear to be a candidate for further anticoagulation unfortunately  Continue to trend hemoglobin    History of pulmonary embolus (PE)- (present on admission)  Assessment & Plan  Hx of PE 4/2021    Adrenal insufficiency (Valentín's disease) (HCC)- (present on admission)  Assessment & Plan  On home regimen per her endocrinologist: hydrocortisone, dexamthasone, florinef  Received tapering dose of hydrocortisone.  Now on hydrocortisone home dose.    MRSA and E. coli sinusitis- (present on admission)  Assessment & Plan  Completed 6 weeks of IV ceftriaxone and vancomycin on 12/21/2021    Acute kidney injury (HCC)- (present on admission)  Assessment & Plan  Resolved.  Creatinine stable.     Hypothyroidism- (present on admission)  Assessment & Plan  Continue thyroid replacement therapy    Asthma- (present on admission)  Assessment & Plan  Not on asthma exacerbation.  Cont albuterol, symbicort and montelukast  Continue chronic hydrocortisone.      Obesity (BMI 35.0-39.9 without comorbidity)- (present on admission)  Assessment & Plan  - Body mass index is 39.13 kg/m²..  -  on weight loss.  - outpatient referral for outpatient weight management.    Acute respiratory failure with hypoxia (HCC)- (present on admission)  Assessment & Plan  Intubated due to hemorrhagic shock  Extubated 12/13  Stable on 2 L of  oxygen by nasal cannula.  Continue RT protocol  Aspiration precautions  Wean O2 as tolerated       VTE prophylaxis: SCD

## 2021-12-27 NOTE — PALLIATIVE CARE
Palliative Care follow-up  PC RN met with patient's  (Colin) and Dr. Rodriguez in conference room. The patient's children (Rodríguez, Shari, and Marissa) were conference called in as well as Colin's sister (Briana). Dr. Rodriguez provided detailed clinical update. Discussed multiple DVTs and inability to resume anticoagulation due to splenic artery aneurysm. Dr. Rodriguez answered multiple questions from family. PC RN again discussed the philosophy of hospice with Colin and family. Discussed that focus changes from disease modifying treatment to focusing on remaining quality of life and symptom management. Family verbalized understanding, all in agreement that pt going home with hospice is appropriate at this time.    Colin did ask PC RN to discuss this with patient as well as she seems more alert today. Colin and PC RN to bedside. Donna states that she understands what hospice is and that she would like to go home and be with her family at this time. She understands that this is for end of life care.     POLST completed at this encounter with pt choosing DNR, comfort focused care, and no artificial nutrition. Signed by DPOA-MARELY, Colin, and physician. The original was hung above the bed to be sent home with pt upon discharge and a copy will be scanned into Epic.    Active listening, reflection, reminiscing, validation & normalization, and empathic support utilized throughout this encounter.  All questions answered.     Updated: MD and CUONG    Plan: Discharge home with hospice as soon as possible.     Thank you for allowing Palliative Care to support this patient and family. Contact [phone] for additional assistance, change in patient status, or with any questions/concerns.

## 2021-12-27 NOTE — CARE PLAN
Problem: Nutritional:  Goal: Achieve adequate nutritional intake  Description: Patient will consume >50% of meals and supplements.   Outcome: Not Met

## 2021-12-27 NOTE — CARE PLAN
The patient is Watcher - Medium risk of patient condition declining or worsening    Shift Goals  Clinical Goals: Pain management    Progress made toward(s) clinical / shift goals:  Pain managed with PRN Oxycodone and PRN Dilaudid and repositioning.

## 2021-12-27 NOTE — PALLIATIVE CARE
Palliative Care follow-up  1100: PC RN met with patient's  at bedside per his request. Colin is requesting information about hospice. Discussed the philosophy of hospice. PC RN educated that hospice is a group of caregivers who provide end of life care, focused on remaining quality of life rather than quantity of life. Discussed that the focus switches from disease modifying treatment to management of symptoms such as dyspnea, anxiety, nausea, pain, etc. I counseled that they provide EOL care needs and are available 24/7, though they do not provide 24/7 direct care. Colin verbalized understanding and states that he would like to proceed with hospice care at this time. Hospice choice made for [facility]. PC RN offered to contact any remaining family members or set up a care conference. Colin declines as he states that he and Donna's children are all on the same page.    1300: PC RN received phone call from Colin requesting care conference prior to officially moving forward with hospice. He states that a family member has some additional questions that he is unable to answer at this time. Dr. Rodriguez contacted and is available for meeting at 1500.     Updated: MD    Plan: Care conference at 1500 12/27    Thank you for allowing Palliative Care to support this patient and family. Contact [phone] for additional assistance, change in patient status, or with any questions/concerns.

## 2021-12-27 NOTE — PROGRESS NOTES
Received bedside report from night shift RN.   Assumed care of patient at change of shift.   Assessment complete and POC discussed.   A&Ox4, , RR 20, /97, on 2L via NC.  at bedside.  Patient reports 9/10 abdominal pain - medicated with PRN Dilaudid.   Patient stated she would eat breakfast this morning.  Brought breakfast tray to patient's room.   Patient took 1 drink of cranberry juice and said she was done.   SCDs on bilateral lower extremities.   Bed is in lowest/locked position.   Call light and belongings are within reach.   No further needs at this time.    1400: Patient requested to have a break from prevalon boots. Educated patient on importance of prevalon boots to help offload heels and prevent DTIs. Patient verbalizes understanding and agrees to put them back on later.

## 2021-12-28 LAB
ALBUMIN SERPL BCP-MCNC: 2.2 G/DL (ref 3.2–4.9)
ALBUMIN/GLOB SERPL: 0.7 G/DL
ALP SERPL-CCNC: 141 U/L (ref 30–99)
ALT SERPL-CCNC: 21 U/L (ref 2–50)
ANION GAP SERPL CALC-SCNC: 12 MMOL/L (ref 7–16)
ANISOCYTOSIS BLD QL SMEAR: ABNORMAL
AST SERPL-CCNC: 23 U/L (ref 12–45)
BACTERIA BLD CULT: NORMAL
BACTERIA BLD CULT: NORMAL
BASOPHILS # BLD AUTO: 0 % (ref 0–1.8)
BASOPHILS # BLD: 0 K/UL (ref 0–0.12)
BILIRUB SERPL-MCNC: 0.5 MG/DL (ref 0.1–1.5)
BUN SERPL-MCNC: 26 MG/DL (ref 8–22)
CALCIUM SERPL-MCNC: 8.7 MG/DL (ref 8.5–10.5)
CHLORIDE SERPL-SCNC: 106 MMOL/L (ref 96–112)
CO2 SERPL-SCNC: 25 MMOL/L (ref 20–33)
CREAT SERPL-MCNC: 0.69 MG/DL (ref 0.5–1.4)
EOSINOPHIL # BLD AUTO: 0 K/UL (ref 0–0.51)
EOSINOPHIL NFR BLD: 0 % (ref 0–6.9)
ERYTHROCYTE [DISTWIDTH] IN BLOOD BY AUTOMATED COUNT: 68.4 FL (ref 35.9–50)
GLOBULIN SER CALC-MCNC: 3.1 G/DL (ref 1.9–3.5)
GLUCOSE BLD-MCNC: 134 MG/DL (ref 65–99)
GLUCOSE BLD-MCNC: 136 MG/DL (ref 65–99)
GLUCOSE BLD-MCNC: 153 MG/DL (ref 65–99)
GLUCOSE BLD-MCNC: 156 MG/DL (ref 65–99)
GLUCOSE SERPL-MCNC: 159 MG/DL (ref 65–99)
HCT VFR BLD AUTO: 25.6 % (ref 37–47)
HGB BLD-MCNC: 7.9 G/DL (ref 12–16)
LYMPHOCYTES # BLD AUTO: 0.3 K/UL (ref 1–4.8)
LYMPHOCYTES NFR BLD: 2.6 % (ref 22–41)
MAGNESIUM SERPL-MCNC: 1.6 MG/DL (ref 1.5–2.5)
MANUAL DIFF BLD: NORMAL
MCH RBC QN AUTO: 28.1 PG (ref 27–33)
MCHC RBC AUTO-ENTMCNC: 30.9 G/DL (ref 33.6–35)
MCV RBC AUTO: 91.1 FL (ref 81.4–97.8)
METAMYELOCYTES NFR BLD MANUAL: 0.9 %
MICROCYTES BLD QL SMEAR: ABNORMAL
MONOCYTES # BLD AUTO: 0.9 K/UL (ref 0–0.85)
MONOCYTES NFR BLD AUTO: 7.8 % (ref 0–13.4)
MORPHOLOGY BLD-IMP: NORMAL
MYELOCYTES NFR BLD MANUAL: 2.6 %
NEUTROPHILS # BLD AUTO: 9.71 K/UL (ref 2–7.15)
NEUTROPHILS NFR BLD: 82.6 % (ref 44–72)
NEUTS BAND NFR BLD MANUAL: 1.8 % (ref 0–10)
NRBC # BLD AUTO: 0.21 K/UL
NRBC BLD-RTO: 1.8 /100 WBC
OVALOCYTES BLD QL SMEAR: NORMAL
PHOSPHATE SERPL-MCNC: 4 MG/DL (ref 2.5–4.5)
PLATELET # BLD AUTO: 361 K/UL (ref 164–446)
PLATELET BLD QL SMEAR: NORMAL
PMV BLD AUTO: 10 FL (ref 9–12.9)
POIKILOCYTOSIS BLD QL SMEAR: NORMAL
POLYCHROMASIA BLD QL SMEAR: NORMAL
POTASSIUM SERPL-SCNC: 4.5 MMOL/L (ref 3.6–5.5)
PROMYELOCYTES NFR BLD MANUAL: 1.7 %
PROT SERPL-MCNC: 5.3 G/DL (ref 6–8.2)
RBC # BLD AUTO: 2.81 M/UL (ref 4.2–5.4)
RBC BLD AUTO: PRESENT
SIGNIFICANT IND 70042: NORMAL
SIGNIFICANT IND 70042: NORMAL
SITE SITE: NORMAL
SITE SITE: NORMAL
SODIUM SERPL-SCNC: 143 MMOL/L (ref 135–145)
SOURCE SOURCE: NORMAL
SOURCE SOURCE: NORMAL
WBC # BLD AUTO: 11.5 K/UL (ref 4.8–10.8)

## 2021-12-28 PROCEDURE — 80053 COMPREHEN METABOLIC PANEL: CPT

## 2021-12-28 PROCEDURE — 82962 GLUCOSE BLOOD TEST: CPT

## 2021-12-28 PROCEDURE — 85730 THROMBOPLASTIN TIME PARTIAL: CPT

## 2021-12-28 PROCEDURE — 36415 COLL VENOUS BLD VENIPUNCTURE: CPT

## 2021-12-28 PROCEDURE — A9270 NON-COVERED ITEM OR SERVICE: HCPCS | Performed by: HOSPITALIST

## 2021-12-28 PROCEDURE — 700102 HCHG RX REV CODE 250 W/ 637 OVERRIDE(OP): Performed by: HOSPITALIST

## 2021-12-28 PROCEDURE — 99232 SBSQ HOSP IP/OBS MODERATE 35: CPT | Performed by: INTERNAL MEDICINE

## 2021-12-28 PROCEDURE — 85613 RUSSELL VIPER VENOM DILUTED: CPT

## 2021-12-28 PROCEDURE — 700102 HCHG RX REV CODE 250 W/ 637 OVERRIDE(OP): Performed by: INTERNAL MEDICINE

## 2021-12-28 PROCEDURE — A9270 NON-COVERED ITEM OR SERVICE: HCPCS | Performed by: STUDENT IN AN ORGANIZED HEALTH CARE EDUCATION/TRAINING PROGRAM

## 2021-12-28 PROCEDURE — 83735 ASSAY OF MAGNESIUM: CPT

## 2021-12-28 PROCEDURE — 94640 AIRWAY INHALATION TREATMENT: CPT

## 2021-12-28 PROCEDURE — 700101 HCHG RX REV CODE 250: Performed by: INTERNAL MEDICINE

## 2021-12-28 PROCEDURE — 85520 HEPARIN ASSAY: CPT

## 2021-12-28 PROCEDURE — 700102 HCHG RX REV CODE 250 W/ 637 OVERRIDE(OP): Performed by: STUDENT IN AN ORGANIZED HEALTH CARE EDUCATION/TRAINING PROGRAM

## 2021-12-28 PROCEDURE — 94760 N-INVAS EAR/PLS OXIMETRY 1: CPT

## 2021-12-28 PROCEDURE — 770006 HCHG ROOM/CARE - MED/SURG/GYN SEMI*

## 2021-12-28 PROCEDURE — 700101 HCHG RX REV CODE 250: Performed by: STUDENT IN AN ORGANIZED HEALTH CARE EDUCATION/TRAINING PROGRAM

## 2021-12-28 PROCEDURE — 85610 PROTHROMBIN TIME: CPT

## 2021-12-28 PROCEDURE — 700111 HCHG RX REV CODE 636 W/ 250 OVERRIDE (IP): Performed by: STUDENT IN AN ORGANIZED HEALTH CARE EDUCATION/TRAINING PROGRAM

## 2021-12-28 PROCEDURE — 85007 BL SMEAR W/DIFF WBC COUNT: CPT

## 2021-12-28 PROCEDURE — A9270 NON-COVERED ITEM OR SERVICE: HCPCS | Performed by: INTERNAL MEDICINE

## 2021-12-28 PROCEDURE — 84100 ASSAY OF PHOSPHORUS: CPT

## 2021-12-28 PROCEDURE — 85027 COMPLETE CBC AUTOMATED: CPT

## 2021-12-28 RX ADMIN — LIDOCAINE 1 PATCH: 50 PATCH TOPICAL at 07:32

## 2021-12-28 RX ADMIN — ALBUTEROL SULFATE 2.5 MG: 2.5 SOLUTION RESPIRATORY (INHALATION) at 07:16

## 2021-12-28 RX ADMIN — INSULIN HUMAN 5 UNITS: 100 INJECTION, SUSPENSION SUBCUTANEOUS at 06:08

## 2021-12-28 RX ADMIN — INSULIN HUMAN 3 UNITS: 100 INJECTION, SOLUTION PARENTERAL at 20:32

## 2021-12-28 RX ADMIN — METHYLPREDNISOLONE SODIUM SUCCINATE 40 MG: 40 INJECTION, POWDER, FOR SOLUTION INTRAMUSCULAR; INTRAVENOUS at 14:33

## 2021-12-28 RX ADMIN — BUDESONIDE AND FORMOTEROL FUMARATE DIHYDRATE 2 PUFF: 160; 4.5 AEROSOL RESPIRATORY (INHALATION) at 19:46

## 2021-12-28 RX ADMIN — FLUDROCORTISONE ACETATE 0.2 MG: 0.1 TABLET ORAL at 05:31

## 2021-12-28 RX ADMIN — FLUDROCORTISONE ACETATE 0.2 MG: 0.1 TABLET ORAL at 16:31

## 2021-12-28 RX ADMIN — OXYCODONE HYDROCHLORIDE 10 MG: 10 TABLET ORAL at 10:38

## 2021-12-28 RX ADMIN — MONTELUKAST 10 MG: 10 TABLET, FILM COATED ORAL at 16:31

## 2021-12-28 RX ADMIN — GABAPENTIN 200 MG: 100 CAPSULE ORAL at 21:07

## 2021-12-28 RX ADMIN — ALBUTEROL SULFATE 2.5 MG: 2.5 SOLUTION RESPIRATORY (INHALATION) at 11:28

## 2021-12-28 RX ADMIN — DULOXETINE HYDROCHLORIDE 60 MG: 60 CAPSULE, DELAYED RELEASE ORAL at 20:25

## 2021-12-28 RX ADMIN — LIOTHYRONINE SODIUM 25 MCG: 25 TABLET ORAL at 05:34

## 2021-12-28 RX ADMIN — OMEPRAZOLE 20 MG: 20 CAPSULE, DELAYED RELEASE ORAL at 05:30

## 2021-12-28 RX ADMIN — OXYCODONE HYDROCHLORIDE 10 MG: 10 TABLET ORAL at 05:30

## 2021-12-28 RX ADMIN — METHYLPREDNISOLONE SODIUM SUCCINATE 40 MG: 40 INJECTION, POWDER, FOR SOLUTION INTRAMUSCULAR; INTRAVENOUS at 05:31

## 2021-12-28 RX ADMIN — ALBUTEROL SULFATE 2.5 MG: 2.5 SOLUTION RESPIRATORY (INHALATION) at 19:46

## 2021-12-28 RX ADMIN — LEVOTHYROXINE SODIUM 75 MCG: 0.07 TABLET ORAL at 05:30

## 2021-12-28 RX ADMIN — ACETAMINOPHEN 500 MG: 500 TABLET ORAL at 20:25

## 2021-12-28 RX ADMIN — OXYCODONE HYDROCHLORIDE 10 MG: 10 TABLET ORAL at 20:42

## 2021-12-28 RX ADMIN — INSULIN HUMAN 3 UNITS: 100 INJECTION, SOLUTION PARENTERAL at 06:07

## 2021-12-28 RX ADMIN — HYDROCORTISONE 20 MG: 10 TABLET ORAL at 05:30

## 2021-12-28 RX ADMIN — HYDROCORTISONE 10 MG: 10 TABLET ORAL at 16:31

## 2021-12-28 RX ADMIN — OXYCODONE HYDROCHLORIDE 10 MG: 10 TABLET ORAL at 16:31

## 2021-12-28 RX ADMIN — INSULIN HUMAN 5 UNITS: 100 INJECTION, SUSPENSION SUBCUTANEOUS at 17:47

## 2021-12-28 RX ADMIN — BUDESONIDE AND FORMOTEROL FUMARATE DIHYDRATE 2 PUFF: 160; 4.5 AEROSOL RESPIRATORY (INHALATION) at 07:17

## 2021-12-28 RX ADMIN — METHYLPREDNISOLONE SODIUM SUCCINATE 40 MG: 40 INJECTION, POWDER, FOR SOLUTION INTRAMUSCULAR; INTRAVENOUS at 21:08

## 2021-12-28 RX ADMIN — ALBUTEROL SULFATE 2.5 MG: 2.5 SOLUTION RESPIRATORY (INHALATION) at 16:06

## 2021-12-28 RX ADMIN — DOCUSATE SODIUM 50 MG AND SENNOSIDES 8.6 MG 2 TABLET: 8.6; 5 TABLET, FILM COATED ORAL at 05:30

## 2021-12-28 RX ADMIN — ACETAMINOPHEN 500 MG: 500 TABLET ORAL at 07:33

## 2021-12-28 RX ADMIN — GABAPENTIN 200 MG: 100 CAPSULE ORAL at 05:29

## 2021-12-28 RX ADMIN — GABAPENTIN 200 MG: 100 CAPSULE ORAL at 14:33

## 2021-12-28 RX ADMIN — ACETAMINOPHEN 500 MG: 500 TABLET ORAL at 14:33

## 2021-12-28 RX ADMIN — DOCUSATE SODIUM 50 MG AND SENNOSIDES 8.6 MG 2 TABLET: 8.6; 5 TABLET, FILM COATED ORAL at 16:31

## 2021-12-28 ASSESSMENT — ENCOUNTER SYMPTOMS
SHORTNESS OF BREATH: 0
DIZZINESS: 0
CHILLS: 0
BACK PAIN: 0
WEAKNESS: 1
DEPRESSION: 0
SENSORY CHANGE: 0
VOMITING: 0
NERVOUS/ANXIOUS: 0
SPEECH CHANGE: 0
COUGH: 0
FOCAL WEAKNESS: 0
HEADACHES: 0
FLANK PAIN: 0
MYALGIAS: 1
NAUSEA: 0
MEMORY LOSS: 0
PALPITATIONS: 0
ABDOMINAL PAIN: 0
FEVER: 0

## 2021-12-28 ASSESSMENT — PAIN DESCRIPTION - PAIN TYPE
TYPE: ACUTE PAIN

## 2021-12-28 NOTE — DISCHARGE PLANNING
Anticipated Discharge Disposition: DC home with [facility]    Action: Call placed to [facility] [phone] and spoke to Flavio who states they have not rec'd any request yesterday. I  Informed him that request was sent yesterday by our DPA at 1346, he states, there are no records found on their end. Confirmed with him the fax number and requested for DPA to re-fax hospice referral again.     Barriers to Discharge: pending hospice acceptance    Plan: cont to follow for DC needs.     Addendum:  1223 - called [facility] and spoke with Susi in admissions ([phone]). States they still did not get the referral. Informed her that it has been sent x 2 but cont to fail. Confirmed fax with her. She requested for the records to be emailed to her at [email or web address]  DCP notified and records will be sent to the above email. Also, noted in chart, request for consult with [facility] hospice as well and DCP faxed records to them as well. Pending accepting hospice.

## 2021-12-28 NOTE — DISCHARGE PLANNING
Per RN BRITTANY, [facility] did not receive referral.  DPA will re-fax.    DPA faxed via Epic and it immediately failed.  DPA faxed via [email or web address], fax #[phone] @1560.    Received Choice form at   Agency/Facility Name: [facility]   Referral sent per Choice form @ [phone]

## 2021-12-28 NOTE — CARE PLAN
The patient is Watcher - Medium risk of patient condition declining or worsening    Shift Goals  Clinical Goals: pt's pain will be managed throughout shift    Progress made toward(s) clinical / shift goals:  pt medicated per MAR for abdominal pain.

## 2021-12-28 NOTE — CARE PLAN
The patient is Watcher - Medium risk of patient condition declining or worsening    Shift Goals  Clinical Goals: Pain management, possible discharge home w/  Hospice    Progress made toward(s) clinical / shift goals:  Patient needs home oxygen ordered and still waiting on acceptance from [facility]. Referral sent yesterday. Pain managed with PRN Oxycodone and PRN Dilaudid.

## 2021-12-28 NOTE — DISCHARGE PLANNING
Received Choice form at [phone]  Agency/Facility Name: [facility]  Referral sent per Choice form @ [phone]

## 2021-12-28 NOTE — PROGRESS NOTES
Pt alert/oriented x4, fatigued, denies pain at this time, needs met. Eprez catheter patent. Medication taken without difficulty. Call light within reach, personal belongings available, bed in lowest position, treaded socks on, and bed alarm on. Hourly rounding in place.

## 2021-12-28 NOTE — PROGRESS NOTES
Received bedside report from night shift RN.   Assumed care of patient at change of shift.   Assessment complete and POC discussed.   A&Ox4, , RR 18, /107, on 5L via NC.  at bedside.  SCDs on bilateral lower extremities.   Bed is in lowest/locked position.   Call light and belongings are within reach.   No further needs at this time.

## 2021-12-28 NOTE — THERAPY
PT contact note    Patient Name: Donna Isaac  Age:  56 y.o., Sex:  female  Medical Record #: [id number]  Today's Date: 12/28/2021 12/28-plan is home with hospice, will hold, will be available for family training if needed. -Jayshree Leggett, PT

## 2021-12-28 NOTE — DISCHARGE PLANNING
[facility] did not receive referral because it was 109 pages.   DPA printed and manually faxed at [phone], fax #[phone].

## 2021-12-29 VITALS
OXYGEN SATURATION: 94 % | TEMPERATURE: 98.4 F | RESPIRATION RATE: 24 BRPM | HEIGHT: 64 IN | DIASTOLIC BLOOD PRESSURE: 107 MMHG | HEART RATE: 105 BPM | BODY MASS INDEX: 38.92 KG/M2 | WEIGHT: 227.96 LBS | SYSTOLIC BLOOD PRESSURE: 164 MMHG

## 2021-12-29 LAB
ALBUMIN SERPL BCP-MCNC: 2.5 G/DL (ref 3.2–4.9)
ALBUMIN/GLOB SERPL: 0.9 G/DL
ALP SERPL-CCNC: 136 U/L (ref 30–99)
ALT SERPL-CCNC: 19 U/L (ref 2–50)
ANION GAP SERPL CALC-SCNC: 10 MMOL/L (ref 7–16)
ANISOCYTOSIS BLD QL SMEAR: ABNORMAL
APTT PPP: 28.3 SEC (ref 24.7–36)
AST SERPL-CCNC: 22 U/L (ref 12–45)
BASOPHILS # BLD AUTO: 0 % (ref 0–1.8)
BASOPHILS # BLD: 0 K/UL (ref 0–0.12)
BILIRUB SERPL-MCNC: 0.5 MG/DL (ref 0.1–1.5)
BUN SERPL-MCNC: 21 MG/DL (ref 8–22)
CALCIUM SERPL-MCNC: 8.4 MG/DL (ref 8.5–10.5)
CARDIOLIPIN IGA SER IA-ACNC: <10 APL
CARDIOLIPIN IGG SER IA-ACNC: <10 GPL
CARDIOLIPIN IGM SER IA-ACNC: 11 MPL
CHLORIDE SERPL-SCNC: 109 MMOL/L (ref 96–112)
CO2 SERPL-SCNC: 26 MMOL/L (ref 20–33)
CREAT SERPL-MCNC: 0.54 MG/DL (ref 0.5–1.4)
EOSINOPHIL # BLD AUTO: 0 K/UL (ref 0–0.51)
EOSINOPHIL NFR BLD: 0 % (ref 0–6.9)
ERYTHROCYTE [DISTWIDTH] IN BLOOD BY AUTOMATED COUNT: 68.5 FL (ref 35.9–50)
GLOBULIN SER CALC-MCNC: 2.9 G/DL (ref 1.9–3.5)
GLUCOSE BLD-MCNC: 136 MG/DL (ref 65–99)
GLUCOSE BLD-MCNC: 154 MG/DL (ref 65–99)
GLUCOSE SERPL-MCNC: 183 MG/DL (ref 65–99)
HCT VFR BLD AUTO: 26.1 % (ref 37–47)
HGB BLD-MCNC: 7.8 G/DL (ref 12–16)
HYPOCHROMIA BLD QL SMEAR: ABNORMAL
INR PPP: 1.16 (ref 0.87–1.13)
LA PPP-IMP: ABNORMAL
LYMPHOCYTES # BLD AUTO: 1.13 K/UL (ref 1–4.8)
LYMPHOCYTES NFR BLD: 7.9 % (ref 22–41)
MACROCYTES BLD QL SMEAR: ABNORMAL
MAGNESIUM SERPL-MCNC: 1.5 MG/DL (ref 1.5–2.5)
MANUAL DIFF BLD: NORMAL
MCH RBC QN AUTO: 27.2 PG (ref 27–33)
MCHC RBC AUTO-ENTMCNC: 29.9 G/DL (ref 33.6–35)
MCV RBC AUTO: 90.9 FL (ref 81.4–97.8)
METAMYELOCYTES NFR BLD MANUAL: 1.7 %
MICROCYTES BLD QL SMEAR: ABNORMAL
MONOCYTES # BLD AUTO: 0.5 K/UL (ref 0–0.85)
MONOCYTES NFR BLD AUTO: 3.5 % (ref 0–13.4)
MORPHOLOGY BLD-IMP: NORMAL
MYELOCYTES NFR BLD MANUAL: 3.5 %
NEUTROPHILS # BLD AUTO: 11.93 K/UL (ref 2–7.15)
NEUTROPHILS NFR BLD: 82.5 % (ref 44–72)
NEUTS BAND NFR BLD MANUAL: 0.9 % (ref 0–10)
NRBC # BLD AUTO: 0.32 K/UL
NRBC BLD-RTO: 2.2 /100 WBC
OVALOCYTES BLD QL SMEAR: NORMAL
PHOSPHATE SERPL-MCNC: 3.2 MG/DL (ref 2.5–4.5)
PLATELET # BLD AUTO: 350 K/UL (ref 164–446)
PLATELET BLD QL SMEAR: NORMAL
PMV BLD AUTO: 9.5 FL (ref 9–12.9)
POLYCHROMASIA BLD QL SMEAR: NORMAL
POTASSIUM SERPL-SCNC: 3.8 MMOL/L (ref 3.6–5.5)
PROT SERPL-MCNC: 5.4 G/DL (ref 6–8.2)
PROTHROMBIN TIME: 14.5 SEC (ref 12–14.6)
RBC # BLD AUTO: 2.87 M/UL (ref 4.2–5.4)
RBC BLD AUTO: PRESENT
SCREEN DRVVT: 47.3 SEC (ref 28–48)
SODIUM SERPL-SCNC: 145 MMOL/L (ref 135–145)
TOXIC GRANULES BLD QL SMEAR: SLIGHT
UFH PPP CHRO-ACNC: <0.1 U/ML
WBC # BLD AUTO: 14.3 K/UL (ref 4.8–10.8)

## 2021-12-29 PROCEDURE — 700101 HCHG RX REV CODE 250: Performed by: STUDENT IN AN ORGANIZED HEALTH CARE EDUCATION/TRAINING PROGRAM

## 2021-12-29 PROCEDURE — A9270 NON-COVERED ITEM OR SERVICE: HCPCS | Performed by: INTERNAL MEDICINE

## 2021-12-29 PROCEDURE — A9270 NON-COVERED ITEM OR SERVICE: HCPCS | Performed by: HOSPITALIST

## 2021-12-29 PROCEDURE — 700111 HCHG RX REV CODE 636 W/ 250 OVERRIDE (IP): Performed by: STUDENT IN AN ORGANIZED HEALTH CARE EDUCATION/TRAINING PROGRAM

## 2021-12-29 PROCEDURE — 94760 N-INVAS EAR/PLS OXIMETRY 1: CPT

## 2021-12-29 PROCEDURE — 700102 HCHG RX REV CODE 250 W/ 637 OVERRIDE(OP): Performed by: HOSPITALIST

## 2021-12-29 PROCEDURE — 700101 HCHG RX REV CODE 250: Performed by: INTERNAL MEDICINE

## 2021-12-29 PROCEDURE — A9270 NON-COVERED ITEM OR SERVICE: HCPCS | Performed by: STUDENT IN AN ORGANIZED HEALTH CARE EDUCATION/TRAINING PROGRAM

## 2021-12-29 PROCEDURE — 84100 ASSAY OF PHOSPHORUS: CPT

## 2021-12-29 PROCEDURE — 94640 AIRWAY INHALATION TREATMENT: CPT

## 2021-12-29 PROCEDURE — 85027 COMPLETE CBC AUTOMATED: CPT

## 2021-12-29 PROCEDURE — 82962 GLUCOSE BLOOD TEST: CPT | Mod: 91

## 2021-12-29 PROCEDURE — 83735 ASSAY OF MAGNESIUM: CPT

## 2021-12-29 PROCEDURE — 36415 COLL VENOUS BLD VENIPUNCTURE: CPT

## 2021-12-29 PROCEDURE — 99239 HOSP IP/OBS DSCHRG MGMT >30: CPT | Performed by: INTERNAL MEDICINE

## 2021-12-29 PROCEDURE — 700111 HCHG RX REV CODE 636 W/ 250 OVERRIDE (IP): Performed by: HOSPITALIST

## 2021-12-29 PROCEDURE — 700102 HCHG RX REV CODE 250 W/ 637 OVERRIDE(OP): Performed by: STUDENT IN AN ORGANIZED HEALTH CARE EDUCATION/TRAINING PROGRAM

## 2021-12-29 PROCEDURE — 700102 HCHG RX REV CODE 250 W/ 637 OVERRIDE(OP): Performed by: INTERNAL MEDICINE

## 2021-12-29 PROCEDURE — 80053 COMPREHEN METABOLIC PANEL: CPT

## 2021-12-29 PROCEDURE — 85007 BL SMEAR W/DIFF WBC COUNT: CPT

## 2021-12-29 RX ORDER — PREDNISONE 20 MG/1
20 TABLET ORAL 2 TIMES DAILY
Qty: 30 TABLET | Refills: 0 | Status: SHIPPED | OUTPATIENT
Start: 2021-12-29

## 2021-12-29 RX ORDER — BUDESONIDE AND FORMOTEROL FUMARATE DIHYDRATE 160; 4.5 UG/1; UG/1
2 AEROSOL RESPIRATORY (INHALATION) 2 TIMES DAILY
Qty: 1 EACH | Refills: 0 | Status: SHIPPED | OUTPATIENT
Start: 2021-12-29

## 2021-12-29 RX ORDER — GABAPENTIN 100 MG/1
200 CAPSULE ORAL EVERY 8 HOURS
Qty: 180 CAPSULE | Refills: 0 | Status: SHIPPED | OUTPATIENT
Start: 2021-12-29

## 2021-12-29 RX ORDER — CHOLECALCIFEROL (VITAMIN D3) 125 MCG
5 CAPSULE ORAL
Qty: 30 TABLET | Refills: 0 | Status: SHIPPED | OUTPATIENT
Start: 2021-12-29

## 2021-12-29 RX ADMIN — BUDESONIDE AND FORMOTEROL FUMARATE DIHYDRATE 2 PUFF: 160; 4.5 AEROSOL RESPIRATORY (INHALATION) at 07:25

## 2021-12-29 RX ADMIN — DOCUSATE SODIUM 50 MG AND SENNOSIDES 8.6 MG 2 TABLET: 8.6; 5 TABLET, FILM COATED ORAL at 06:22

## 2021-12-29 RX ADMIN — LEVOTHYROXINE SODIUM 75 MCG: 0.07 TABLET ORAL at 06:22

## 2021-12-29 RX ADMIN — ALBUTEROL SULFATE 2.5 MG: 2.5 SOLUTION RESPIRATORY (INHALATION) at 07:25

## 2021-12-29 RX ADMIN — LABETALOL HYDROCHLORIDE 10 MG: 5 INJECTION, SOLUTION INTRAVENOUS at 08:05

## 2021-12-29 RX ADMIN — INSULIN HUMAN 5 UNITS: 100 INJECTION, SUSPENSION SUBCUTANEOUS at 06:26

## 2021-12-29 RX ADMIN — LIDOCAINE 1 PATCH: 50 PATCH TOPICAL at 08:06

## 2021-12-29 RX ADMIN — HYDROCORTISONE 20 MG: 10 TABLET ORAL at 06:22

## 2021-12-29 RX ADMIN — FLUDROCORTISONE ACETATE 0.2 MG: 0.1 TABLET ORAL at 06:22

## 2021-12-29 RX ADMIN — ACETAMINOPHEN 500 MG: 500 TABLET ORAL at 08:06

## 2021-12-29 RX ADMIN — HYDROMORPHONE HYDROCHLORIDE 1 MG: 1 INJECTION, SOLUTION INTRAMUSCULAR; INTRAVENOUS; SUBCUTANEOUS at 14:08

## 2021-12-29 RX ADMIN — METHYLPREDNISOLONE SODIUM SUCCINATE 40 MG: 40 INJECTION, POWDER, FOR SOLUTION INTRAMUSCULAR; INTRAVENOUS at 06:21

## 2021-12-29 RX ADMIN — GABAPENTIN 200 MG: 100 CAPSULE ORAL at 06:22

## 2021-12-29 RX ADMIN — LIOTHYRONINE SODIUM 25 MCG: 25 TABLET ORAL at 06:29

## 2021-12-29 RX ADMIN — OXYCODONE HYDROCHLORIDE 10 MG: 10 TABLET ORAL at 06:29

## 2021-12-29 RX ADMIN — OMEPRAZOLE 20 MG: 20 CAPSULE, DELAYED RELEASE ORAL at 06:22

## 2021-12-29 ASSESSMENT — PAIN DESCRIPTION - PAIN TYPE: TYPE: ACUTE PAIN

## 2021-12-29 NOTE — WOUND TEAM
[facility]  Inpatient Services  Wound and Skin Care Brief Evaluation    Admission Date: 12/1/2021     Last order of IP CONSULT TO WOUND CARE was found on 12/25/2021 from Hospital Encounter on 12/1/2021     HPI, PMH, SH: Reviewed    Chief Complaint   Patient presents with   • LUQ Pain   • Chest Pain   • Arm Pain   • Shortness of Breath     Diagnosis: PE (pulmonary thromboembolism) ([facility]) [I26.99]  Shock ([facility]) [R57.9]    Unit where seen by Wound Team: S624/01     Wound consult placed regarding left arm skin tear, right medial thigh, left plantar foot, and sacrum. Chart and images reviewed. This RN in to assess patient. Pt and patient's  agreeable. Non-selectively debrided with NS/wound cleanser and gauze OR moist warm washcloth and no rinse foam soap. Left skin tear, minimal drainage, mepitel one and silicone adhesive foam placed.  Right medial thigh, partial thickness scratch, hydrocolloid thin applied. Left foot and sacrum assessed.  No pressure injuries or advanced wound care needs identified. Wound consult completed. No further follow up unless indicated and consulted.                           RSKIN:   CURRENTLY IN PLACE (X), APPLIED THIS VISIT (A), ORDERED (O):   Q shift Matthew:  X  Q shift pressure point assessments:  X    Surface/Positioning   Pressure redistribution mattress   x         Low Airloss          Bariatric foam      Bariatric CHEVY     Waffle cushion        Waffle Overlay  x        Reposition q 2 hours    x  TAPs Turning system     Z Jack Pillow     Offloading/Redistribution   Sacral Mepilex (Silicone dressing)  x   Heel Mepilex (Silicone dressing)         Heel float boots (Prevalon boot)             Float Heels off Bed with Pillows           Respiratory NA  Silicone O2 tubing         Gray Foam Ear protectors     Cannula fixation Device (Tender )          High flow offloading Clip    Elastic head band offloading device      Anchorfast                                                          Trach with Optifoam split foam             Containment/Moisture Prevention NA    Rectal tube or BMS    Purwick/Condom Cath        Perez Catheter    Barrier wipes           Barrier paste       Antifungal tx      Interdry        Mobilization NA      Up to chair        Ambulate      PT/OT      Nutrition NA      Dietician        Diabetes Education      PO     TF     TPN     NPO   # days     Other

## 2021-12-29 NOTE — PROGRESS NOTES
[facility] Medicine Daily Progress Note    Date of Service  12/28/2021    Chief Complaint  Donna Isaac is a 56 y.o. female admitted 12/1/2021 with abd/chest pain    Hospital Course  Ms Isaac has a complicated past medical history that includes traumatic brain injury, congestive heart failure and Valentín's disease and PE 4/2021 completed 3 months of eliquis.  She presented the emergency room on 12/1/2021 with complaints of abdominal pain.  Patient was found to have a pulmonary embolism and acute pancreatitis.  Patient started on heparin drip and admitted to the hospital, she subsequently developed hemorrhagic shock,  she was started on pressors and orders were changed to admit to the ICU.  While waiting for transfer the patient had cardiac arrest in the emergency room, CPR was per formed, she was intubated, she was resuscitated with massive transfusion protocol. CTA abd noted Small to moderate amount of hemoperitoneum in the abdomen and pelvis. Surgery was consulted, and deemed to be a poor surgical candidate, cont monitoring Hb.     Patient has been supported with ventilator, and IVC filter has been placed 12/6 , her renal function has improved and she was extubated on 12/13/2021.    The patient was diagnosed with left upper venous thrombosis including subclavian vein, axillary vein, acute deep vein thrombosis in the left posterior tibial vein 12/1 and 12/2. She is also found to have DVT RUE in one of the paired mid-distal brachial vein 12/25. Lovenox was resumed on 12/18, which was stopped on 12/23 due to development of splenic artery aneurysm.  She was taken urgently to IR for stenting.  CT of the abdomen and ultrasound of the right upper extremity showed unchanged large hematoma in the left abdomen, and right upper extremity DVT.  She had no evidence of abdominal compartment syndrome.  Oncology was consulted.    Interval Problem Update  12/27/2021 - I reviewed the patient's chart. There were no  significant overnight events. Remains hemodynamically stable with blood pressure elevated at 164/107.  Afebrile. Stable on 2L O2 NC.  Hemoglobin 7.9.  WBC 13.4.  Electrolytes and renal function are normal.  LFTs are normal, except for evaded alkaline phosphatase of 171.  Oncology gave the recommendation that we cannot anticoagulate her.    12/28 patient with generalized weakness, following commands, denies any pain  Pending hospice acceptance, plan of care reviewed with patient and  at bedside  Would like continued conservative management with insulin    > I have personally seen and examined the patient today.  Discussed with , and patient, and they would like to pursue hospice services at home.  Otherwise she is comfortable, and denies any pain, and she is not in distress.  No nausea, vomiting, abdominal pain.      I have personally seen and examined the patient at bedside. I discussed the plan of care with patient, bedside RN and pharmacy.    Consultants/Specialty  general surgery and nephrology  Critical care  Hem/onc    Code Status  DNAR/DNI    Disposition  Patient is medically cleared.   Anticipate discharge to to hospice. Plan for home hospice.   I have placed the appropriate orders for post-discharge needs.    Review of Systems  Review of Systems   Constitutional: Negative for chills and fever.   HENT: Negative for congestion and hearing loss.    Respiratory: Negative for cough and shortness of breath.    Cardiovascular: Negative for chest pain, palpitations and leg swelling.   Gastrointestinal: Negative for abdominal pain, nausea and vomiting.   Genitourinary: Negative for dysuria and flank pain.   Musculoskeletal: Positive for myalgias. Negative for back pain and joint pain.   Neurological: Positive for weakness. Negative for dizziness, sensory change, speech change, focal weakness and headaches.   Psychiatric/Behavioral: Negative for depression and memory loss. The patient is not  nervous/anxious.       Pertinent positives/negatives as mentioned above.     A complete review of systems was personally done by me. All other systems were negative.         Physical Exam  Temp:  [36.3 °C (97.3 °F)-36.6 °C (97.9 °F)] 36.6 °C (97.9 °F)  Pulse:  [110-115] 110  Resp:  [17-20] 18  BP: (155-165)/(100-111) 159/100  SpO2:  [90 %-96 %] 92 %    Physical Exam  Vitals and nursing note reviewed.   Constitutional:       General: She is not in acute distress.     Appearance: She is obese. She is ill-appearing. She is not diaphoretic.      Comments: Body mass index is 39.13 kg/m².   HENT:      Head: Normocephalic and atraumatic.      Nose: Nose normal. No rhinorrhea.      Mouth/Throat:      Pharynx: No oropharyngeal exudate or posterior oropharyngeal erythema.   Eyes:      General: No scleral icterus.        Right eye: No discharge.         Left eye: No discharge.   Cardiovascular:      Rate and Rhythm: Normal rate and regular rhythm.      Pulses: Normal pulses.      Heart sounds: Normal heart sounds. No murmur heard.  No gallop.    Pulmonary:      Effort: Pulmonary effort is normal. No respiratory distress.      Breath sounds: No stridor. Decreased breath sounds, wheezing and rhonchi present.      Comments: Diminished air entry B/L bases, with occasional bilateral rales.  Chest:      Chest wall: No tenderness.   Abdominal:      General: Bowel sounds are normal. There is no distension.      Palpations: Abdomen is soft. There is no mass.      Tenderness: There is abdominal tenderness. There is no rebound.   Musculoskeletal:         General: Swelling present. No tenderness.      Cervical back: Neck supple. No rigidity.   Skin:     General: Skin is warm and dry.      Coloration: Skin is not cyanotic or jaundiced.      Nails: There is no clubbing.   Neurological:      General: No focal deficit present.      Mental Status: She is alert and oriented to person, place, and time.      Cranial Nerves: No cranial nerve  deficit.      Motor: Weakness (Generalized) present.      Coordination: Coordination normal.      Comments: Lethargic, but arousable.  Answers questions.   Psychiatric:         Mood and Affect: Mood normal.         Behavior: Behavior normal.         Fluids    Intake/Output Summary (Last 24 hours) at 12/28/2021 1712  Last data filed at 12/28/2021 0400  Gross per 24 hour   Intake 240 ml   Output 600 ml   Net -360 ml       Laboratory  Recent Labs     12/26/21  1222 12/27/21  0505 12/28/21  0727   WBC 13.9* 13.4* 11.5*   RBC 3.04* 2.85* 2.81*   HEMOGLOBIN 8.2* 7.9* 7.9*   HEMATOCRIT 27.8* 25.6* 25.6*   MCV 91.4 89.8 91.1   MCH 27.0 27.7 28.1   MCHC 29.5* 30.9* 30.9*   RDW 70.9* 67.5* 68.4*   PLATELETCT 330 337 361   MPV 9.9 10.2 10.0     Recent Labs     12/26/21  1222 12/27/21  0505 12/28/21  0727   SODIUM 135 139 143   POTASSIUM 4.7 4.9 4.5   CHLORIDE 101 104 106   CO2 23 24 25   GLUCOSE 119* 140* 159*   BUN 30* 27* 26*   CREATININE 1.14 0.95 0.69   CALCIUM 8.9 8.7 8.7     Recent Labs     12/27/21  0505   INR 1.17*               Imaging  US-EXTREMITY VENOUS UPPER UNILAT RIGHT   Final Result      CTA ABDOMEN PELVIS W & W/O POST PROCESS   Final Result         1. Grossly unchanged large hematoma in the left abdomen.   2. No active contrast extravasation seen.   3. Retention of IV contrast in the kidneys from prior study concerning for contrast nephropathy.   4. Moderate left pleural effusion. Patchy bibasilar atelectasis.      IR-EMBOLIZATION   Final Result      Small pseudoaneurysm with bleeding arising from the splenic artery, successfully treated with covered stent               CT-CHEST,ABDOMEN,PELVIS WITH   Final Result      1.  Tiny focal contrast blush along the inferior margin of the dominant hematoma.   2.  A vessel cutoff along the posterior margin of the smaller hematoma, with a small adjacent 5 mm arterial aneurysm versus venous phlebolith. Either one of these may be the source of patient's bleeding.   3.   Hemoperitoneum.   4.  Stable moderate left effusion with associated atelectasis. Trace right effusion.   5.  Stable scattered groundglass opacities in the upper lobes, likely infectious/inflammatory. Active pneumonia should be considered.   6.  A 4 cm ascending aortic aneurysm.   7.  Findings of anemia.      CT-CHEST,ABDOMEN,PELVIS W/O   Final Result      1.  Large, 15 cm hematoma between the greater curvature of the excluded stomach and transverse colon. It is new since prior study.   2.  A smaller, 5.6 cm hematoma posterior to the gastric fundus, also new, possibly in the gastric fundus wall or at the bypass surgical staple line.   3.  Worsening hemoperitoneum.   4.  Slightly hyperdense moderate left effusion with associated atelectasis. Trace right effusion.   5.  Scattered groundglass opacities in the upper lobes, new since prior study, likely infectious/inflammatory. Active pneumonia should be considered.   6.  A 4 cm ascending aortic aneurysm.   7.  Findings of anemia.      Findings were discussed with Dr. Mirza on 12/23/2021 12:12 PM.      DX-FOOT-2- LEFT   Final Result      Soft tissue swelling without acute osseous abnormality.      DX-CHEST-PORTABLE (1 VIEW)   Final Result         1.  Mild edema and/or infiltrate, particularly on the right, stable since prior study   2.  Cardiomegaly      DX-CHEST-PORTABLE (1 VIEW)   Final Result         1.  Mild edema and/or infiltrate, particularly on the right   2.  Cardiomegaly      DX-CHEST-PORTABLE (1 VIEW)   Final Result         1.  Bibasilar atelectasis or early infiltrate   2.  Cardiomegaly      DX-CHEST-PORTABLE (1 VIEW)   Final Result      1.  Bibasilar underinflation atelectasis which could obscure an additional process. This is unchanged.   2.  Suspect small LEFT pleural effusion      DX-CHEST-PORTABLE (1 VIEW)   Final Result         1.  Bilateral basilar atelectasis, no focal infiltrate   2.  Cardiomegaly      DX-CHEST-PORTABLE (1 VIEW)   Final Result          1.  Bilateral basilar atelectasis, no focal infiltrate   2.  Cardiomegaly      DX-CHEST-PORTABLE (1 VIEW)   Final Result         1.  Left lower lobe atelectasis or infiltrates, similar compared to prior study.   2.  Cardiomegaly      DX-CHEST-PORTABLE (1 VIEW)   Final Result         1.  Left lower lobe atelectasis or infiltrates, similar compared to prior study.   2.  Cardiomegaly      DX-CHEST-PORTABLE (1 VIEW)   Final Result         1.  Left lower lobe atelectasis or infiltrates, similar compared to prior study.   2.  Cardiomegaly      IR-INSERT IVC FILTER WITH IG & SI   Final Result      Ultrasound and fluoroscopic guided placement of an Option Elite IVC filter.      DX-CHEST-PORTABLE (1 VIEW)   Final Result         1.  Pulmonary edema and/or infiltrates are identified, which are stable since the prior exam.   2.  Cardiomegaly      DX-CHEST-PORTABLE (1 VIEW)   Final Result      Tubes and lines as described above.      Bibasilar atelectasis with small pleural effusions.      DX-CHEST-PORTABLE (1 VIEW)   Final Result         1. No significant interval change.      [id number] VIEW   Final Result      Cortrak feeding tube tip projects in the region of the mid stomach.      DX-CHEST-PORTABLE (1 VIEW)   Final Result      Placement of right IJ dialysis catheter.      Otherwise stable findings.      DX-CHEST-PORTABLE (1 VIEW)   Final Result         1.  Pulmonary edema and/or infiltrates are identified, which are stable since the prior exam.   2.  Cardiomegaly      DX-CHEST-PORTABLE (1 VIEW)   Final Result         1.  Pulmonary edema and/or infiltrates are identified, which are stable since the prior exam.   2.  Cardiomegaly      DX-CHEST-FOR LINE PLACEMENT Perform procedure in: Patient's Room   Final Result      Interval placement of a left IJ central venous catheter with the tip overlying the right atrium.      US-EXTREMITY VENOUS LOWER BILAT   Final Result      CTA ABDOMEN PELVIS W & W/O POST PROCESS   Final Result       1.  Small to moderate amount of hemoperitoneum in the abdomen and pelvis. No active extravasation is identified.   2.  No aortic aneurysm or dissection.   3.  Peripancreatic stranding can be related to pancreatitis.   4.  Cardiomegaly.   5.  Hepatic steatosis.   6.  Status post cholecystectomy.   7.  Atrophic kidneys bilaterally.   8.  Status post gastric bypass surgery.   9.  Mild wall thickening of the second portion of the duodenum with surrounding inflammation. This can be seen in duodenitis/peptic ulcer disease.   10.  Colonic diverticulosis.   11.  Bibasilar atelectasis/consolidation. Groundglass opacities with septal thickening at the lung bases can be seen in the setting of edema or multifocal pneumonia.   12.  Bilateral anterior rib fractures.      Findings discussed with Dr. Rush.      DX-CHEST-PORTABLE (1 VIEW)   Final Result      1.  Endotracheal tube present.      2.  Cardiomegaly with interstitial prominence.      EC-ECHOCARDIOGRAM COMPLETE W/ CONT   Final Result      CT-CTA CHEST PULMONARY ARTERY W/ RECONS   Final Result      1.  Positive for pulmonary embolism located in multiple segmental and subsegmental branches      2.  Minimal elevation of RV/LV ratio      3.  Mild atelectasis      4.  Left PICC line present and appears appropriately located      5.  Findings were discussed with BLAINE VEGA on 12/1/2021 5:35 PM.                  CT-ABDOMEN-PELVIS WITH   Final Result      1.  Apparent inflammation of the pancreas with surrounding fat stranding and fluid suggesting pancreatitis. Recommend correlation with lipase.      2.  Diverticulosis without diverticulitis.      3.  Hepatic steatosis.      US-EXTREMITY VENOUS UPPER UNILAT LEFT   Final Result      DX-CHEST-PORTABLE (1 VIEW)   Final Result      1.  No acute cardiopulmonary abnormality identified.      2.  Left PICC line appears appropriately located           Assessment/Plan  * Hemoperitoneum- (present on admission)  Assessment &  Plan  In setting of acute pancreatitis in a pt on full dose anticoagulation  Worsened with new bleeding episode on 12/23  Of splenic artery aneurysm bleeding, status post stenting by IR 12/23  Surgery consulted, poor surgical candidate, cont monitoring Hb  Recent CT abd 12/25 shows unchanged large hematoma in the left abdomen  Per surgery, no intervention at this point. Poor surgical candidate. Contine monitoring Hb. No sign of abdominal compartment syndrome    12/28 pending hospice acceptance,  to follow-up  Met with  at bedside  Discussed with patient and , goals of hospice care, would like to continue insulin and conservative management    DNR (do not resuscitate)  Assessment & Plan  Appreciated palliative consult, patient is DNR/DNI, and now desiring hospice    Leukocytosis- (present on admission)  Assessment & Plan  Likely reactive  Procalcitonin mildly elevated patient with improving oxygen requirements and no other clinical signs of pneumonia  Continue close clinical monitoring off antibiotics for now    Hemorrhagic shock (HCC)- (present on admission)  Assessment & Plan  Occurred twice 12/2 and 12/23 while she was on anticoagulation. Status post massive transfusion protocol and speneic artery stenting on 12/23. Shock resolved  Continue close clinical monitoring  Contraindicated at this time given recurrent severe bleed    Splenic artery aneurysm (HCC)  Assessment & Plan  With hemorrhagic shock status post stenting on 12/23/2021  If hemoglobin remains stable may need aspirin and Plavix for stent patency per IR      Pulmonary hypertension (HCC)- (present on admission)  Assessment & Plan  ROSSI, Hx PE  Continue oxygen      Atelectasis  Assessment & Plan  Continue incentive spirometry, positional changes, alveolar recruiting    Paroxysmal atrial fibrillation (HCC)- (present on admission)  Assessment & Plan  Currently in sinus  Anticoagulation contraindicated at this time given recurrent  intra-abdominal bleeding    DVT (deep venous thrombosis) (HCC)- (present on admission)  Assessment & Plan  DVT in left upper venous thrombosis including subclavian vein, axillary vein. DVT the left posterior tibial vein 12/1 and 12/2. She is also found to have DVT RUE in one of the paired mid-distal brachial vein 12/25.   Anticoagulation contraindicated at this time given recurrent severe bleed  S/p IVC filter 12/6    Cardiac arrest (HCC)- (present on admission)  Assessment & Plan  Secondary to hemorrhagic shock on 12/2 at ED, CPR was per formed, she was intubated, she was resuscitated with massive transfusion protocol. Patient was extubated on 12/13  Echo 12/2: The left ventricular ejection fraction is visually estimated to be 60%. The right ventricle is dilated. Reduced right ventricular systolic function.    Acute pancreatitis- (present on admission)  Assessment & Plan  resolved    PE (pulmonary thromboembolism) (HCC)- (present on admission)  Assessment & Plan  Hx of PE 4/2021 status post 3 months of eliquis, which was stopped due to GI bleeding.   Recurrent PE on admission per CTA, s/p anticoagulation attempts, given recurrent bleeding anticoagulation discontinued and is contraindicated at this time  Patient underwent IVC placement 12/6  Not a candidate for further anticoagulation due to bleeding    Debilitated patient  Assessment & Plan  Patient and  now opting for home hospice.  Hospice referral placed.  Discussed with CM/SW for discharge planning.    Acute blood loss anemia- (present on admission)  Assessment & Plan  Prior hemoperitoneum complicated with cardiac arrest. S/p multiple transfusions  Status post  arteriogram SMA and celiac and splenic 6 x 38 mm atrium covered stent in splenic artery due to splenic artery pseudoaneurysm with bleeding 12/23  The patient does not appear to be a candidate for further anticoagulation unfortunately  Continue to trend hemoglobin    History of pulmonary embolus  (PE)- (present on admission)  Assessment & Plan  Hx of PE 4/2021    Adrenal insufficiency (Valentín's disease) (HCC)- (present on admission)  Assessment & Plan  On home regimen per her endocrinologist: hydrocortisone, dexamthasone, florinef  Received tapering dose of hydrocortisone.  Now on hydrocortisone home dose.    MRSA and E. coli sinusitis- (present on admission)  Assessment & Plan  Completed 6 weeks of IV ceftriaxone and vancomycin on 12/21/2021    Acute kidney injury (HCC)- (present on admission)  Assessment & Plan  Resolved.  Creatinine stable.     Hypothyroidism- (present on admission)  Assessment & Plan  Continue thyroid replacement therapy    Asthma- (present on admission)  Assessment & Plan  Not on asthma exacerbation.  Cont albuterol, symbicort and montelukast  Continue chronic hydrocortisone.      Obesity (BMI 35.0-39.9 without comorbidity)- (present on admission)  Assessment & Plan  - Body mass index is 39.13 kg/m²..  -  on weight loss.  - outpatient referral for outpatient weight management.    Acute respiratory failure with hypoxia (HCC)- (present on admission)  Assessment & Plan  Intubated due to hemorrhagic shock  Extubated 12/13  Stable on 2 L of oxygen by nasal cannula.  Continue RT protocol  Aspiration precautions  Wean O2 as tolerated       VTE prophylaxis: SCD

## 2021-12-29 NOTE — CARE PLAN
Continue Albuterol SVN 4 x day      Problem: Hyperinflation  Goal: Prevent or improve atelectasis  Description: 1. Instruct incentive spirometry usage  2.  Perform hyperinflation therapy as indicated  Outcome: Met     Problem: Bronchoconstriction  Goal: Improve in air movement and diminished wheezing  Description: 1.  Implement inhaled treatments  2.  Evaluate and manage medication effects  Outcome: Progressing

## 2021-12-29 NOTE — DISCHARGE PLANNING
DC Transport Scheduled    Received request at: 1206    Transport Company Scheduled:  [employer]  Spoke with Felipa at [facility] to schedule transport.      Scheduled Date: 12/29/21  Scheduled Time: 1827-0039    Destination: Home  [address] [place] [address]    Notified care team of scheduled transport via Voalte.     If there are any changes needed to the DC transportation scheduled, please contact [facility] at ext. [phone] between the hours of 1419-6016 Mon-Fri. If outside those hours, contact the ED Case Manager at ext. [phone].

## 2021-12-29 NOTE — PROGRESS NOTES
Pt alert/oriented x4, medicated for abdominal pain per MAR. Tolerating 4L oxygen via nasal cannula. Medication taken without difficulty. Blood sugar=153, 3 units insulin given. Call light within reach, personal belongings available, bed in lowest position, treaded socks on, and bed alarm on. Hourly rounding in place.

## 2021-12-29 NOTE — DISCHARGE INSTRUCTIONS
Discharge Instructions    Discharged to home by ambulance with escort. Discharged via ambulance, hospital escort: Yes.  Special equipment needed: Oxygen    Be sure to schedule a follow-up appointment with your primary care doctor or any specialists as instructed.     Discharge Plan:        I understand that a diet low in cholesterol, fat, and sodium is recommended for good health. Unless I have been given specific instructions below for another diet, I accept this instruction as my diet prescription.   Other diet: Soft bite size    Special Instructions: None    · Is patient discharged on Warfarin / Coumadin?   No     Depression / Suicide Risk    As you are discharged from this [facility] facility, it is important to learn how to keep safe from harming yourself.    Recognize the warning signs:  · Abrupt changes in personality, positive or negative- including increase in energy   · Giving away possessions  · Change in eating patterns- significant weight changes-  positive or negative  · Change in sleeping patterns- unable to sleep or sleeping all the time   · Unwillingness or inability to communicate  · Depression  · Unusual sadness, discouragement and loneliness  · Talk of wanting to die  · Neglect of personal appearance   · Rebelliousness- reckless behavior  · Withdrawal from people/activities they love  · Confusion- inability to concentrate     If you or a loved one observes any of these behaviors or has concerns about self-harm, here's what you can do:  · Talk about it- your feelings and reasons for harming yourself  · Remove any means that you might use to hurt yourself (examples: pills, rope, extension cords, firearm)  · Get professional help from the community (Mental Health, Substance Abuse, psychological counseling)  · Do not be alone:Call your Safe Contact- someone whom you trust who will be there for you.  · Call your local CRISIS HOTLINE 784-7936 or 081-963-1366  · Call your local Children's Mobile  Crisis Response Team [place] (750) 104-4292 or www.[employer].TXCOM  · Call the toll free National Suicide Prevention Hotlines   · National Suicide Prevention Lifeline 759-663-GXWA (8720)  · National Hope Line Network 800-SUICIDE (091-7626)

## 2021-12-29 NOTE — DISCHARGE PLANNING
Anticipated Discharge Disposition: Home with Hospice    Action: Spoke with Flavio at [facility]. States they still did not get any paperwork. Informed them it was sent multiple times yesterday. States they still did not get anything.   Call placed to [facility] and spoke with Tiarra in admissions. States they are accepting the pt to their services and they are meeting with the family at 10 am. States the paperwork will be signed when pt arrives home. She will call me back and let me know if family is agreeable to proceed with them. Tiarra asked what type of DME pt will need. Informed her that pt will need O2, hospital bed and a mattress. Per RN, pt is bed bound and incontinent. Let Tiarra know that as well to coordinate DME as needed.     Barriers to Discharge: pending hospice acceptance and hospice meeting with the family    Plan: cont to follow for DC needs.       Addendum:  1210 - spoke with Katie from [facility]. She met with family and they are going ahead and going to sign on with them. She requested for CM to set up transport between [phone]. DME is being delivered to pt's home ASAP. Called pt's  Colin and confirmed with him, they are going to proceed with [facility]. Confirmed home address. IMM letter is signed and faxed to [facility]. Faxed transportation form to Ride line. Pending  transportation time.     1300 - Ride line notified CM via Voalte:  Transport Company Scheduled:  [employer]  Spoke with Felipa at [facility] to schedule transport.    Scheduled Date: 12/29/21  Scheduled Time: 2910-3933     Destination: Home  [address] [place] [address] [employer]

## 2021-12-29 NOTE — CARE PLAN
The patient is Stable - Low risk of patient condition declining or worsening    Shift Goals  Clinical Goals: pt's pain will be managed throughout shift    Progress made toward(s) clinical / shift goals:  medicated with oxycodone for abdominal pain.    Patient is not progressing towards the following goals:

## 2021-12-29 NOTE — DISCHARGE PLANNING
Agency/Facility Name: [facility]   Outcome: DPA was advised fax referral was not received. [facility] attempted refax twice with no success.     DPA to contact intake department regarding secondary fax to send referral to.     1140-  [facility] was notified via AILYN White that hospice liaison Katie will be on site and will retrieve hard copy of referral from [employer] on T8.

## 2021-12-30 NOTE — DISCHARGE SUMMARY
Discharge Summary    CHIEF COMPLAINT ON ADMISSION  No chief complaint on file.      Reason for Admission       Admission Date  (Not on file)    CODE STATUS  DNAR/DNI    HPI & HOSPITAL COURSE  Ms Isaac has a complicated past medical history that includes traumatic brain injury, congestive heart failure and Glover's disease and PE 4/2021 completed 3 months of eliquis.  She presented the emergency room on 12/1/2021 with complaints of abdominal pain.  Patient was found to have a pulmonary embolism and acute pancreatitis.  Patient started on heparin drip and admitted to the hospital, she subsequently developed hemorrhagic shock,  she was started on pressors and orders were changed to admit to the ICU.  While waiting for transfer the patient had cardiac arrest in the emergency room, CPR was per formed, she was intubated, she was resuscitated with massive transfusion protocol. CTA abd noted Small to moderate amount of hemoperitoneum in the abdomen and pelvis. Surgery was consulted, and deemed to be a poor surgical candidate, cont monitoring Hb.      Patient has been supported with ventilator, and IVC filter has been placed 12/6 , her renal function has improved and she was extubated on 12/13/2021.     The patient was diagnosed with left upper venous thrombosis including subclavian vein, axillary vein, acute deep vein thrombosis in the left posterior tibial vein 12/1 and 12/2. She is also found to have DVT RUE in one of the paired mid-distal brachial vein 12/25. Lovenox was resumed on 12/18, which was stopped on 12/23 due to development of splenic artery aneurysm.  She was taken urgently to IR for stenting.  CT of the abdomen and ultrasound of the right upper extremity showed unchanged large hematoma in the left abdomen, and right upper extremity DVT.  She had no evidence of abdominal compartment syndrome.  Oncology was consulted.    Due to patient's ongoing declining condition, she has transitioned to comfort care and  wishes to discharge to home on hospice.  She has been accepted by [facility] at this point.  Patient did have some shortness of breath with activity which appears to be stabilized on 10 L oxygen prior to discharge.    Therefore, she is discharged in fair and stable condition to hospice.    The patient met 2-midnight criteria for an inpatient stay at the time of discharge.    Discharge Date  12/29/21     FOLLOW UP ITEMS POST DISCHARGE  Hospice    DISCHARGE DIAGNOSES  Active Problems:    * No active hospital problems. *  Resolved Problems:    * No resolved hospital problems. *      FOLLOW UP  Future Appointments   Date Time Provider Department Center   1/17/2022  1:20 PM Jayshree Rush M.D. [facility] None     No follow-up provider specified.    MEDICATIONS ON DISCHARGE     Medication List      Notice    Cannot display discharge medications because the patient has not yet been admitted.         Allergies  Allergies   Allergen Reactions   • Ancef [Cefazolin] Hives and Shortness of Breath     Tolerated ceftriaxone (Dec 2021)   • Bactrim [Sulfamethoxazole W-Trimethoprim] Shortness of Breath   • Bee Venom Anaphylaxis   • Buprenorphine Anaphylaxis     Plus hives and shortness of breath   • Clindamycin Hives and Shortness of Breath   • Contrast Media With Iodine [Iodine] Hives and Swelling   • Doxycycline Hives and Shortness of Breath   • Econazole Anaphylaxis   • Flagyl  [Metronidazole] Hives and Unspecified   • Floxin [Ofloxacin] Anaphylaxis, Shortness of Breath and Swelling     Cause throat swelling and difficulty breathing   • Gadolinium Derivatives Hives and Swelling     Throat swelling   • Hydrocodone-Acetaminophen Hives and Shortness of Breath     Tolerates APAP, oxycodone   • Iodine Shortness of Breath and Anaphylaxis     Throat and tongue swelling with IV contrast   • Keflex Shortness of Breath     And hives  Tolerated ceftriaxone (Dec 2021)   • Levofloxacin Shortness of Breath     And anaphylaxis reported  "  • Morphine Anaphylaxis     Tolerates oxycodone   • Naloxone Hives     \"hives, SOB\"   • Nitrofurantoin Shortness of Breath     ...and hives     • Nyquil Hives and Shortness of Breath   • Paricalcitol Hives, Shortness of Breath and Unspecified     CHEST PAIN   • Penicillins Shortness of Breath     ...and hives  Tolerated ceftriaxone (Dec 2021)   • Tape Contact Dermatitis and Swelling     Blisters, clear tegaderm ok.. No steristrips.  Other reaction(s): Other, Unknown   • Tramadol Hives   • Vicks Dayquil Cold Hives and Shortness of Breath     Tolerates APAP   • Azithromycin Hives and Shortness of Breath     Pt had Hives also   • Bextra [Valdecoxib] Rash     \"Skin burn and peeling.\"  Tolerated Lasix and Diuril (Dec 2021)   • Linezolid Rash     Rash all over body   • Adhesive Remover [Skin Adhesives]      Other reaction(s): Unknown   • Codeine Shortness of Breath and Rash     Rash & SOB   • Sulfa Drugs      Other reaction(s): Hives   • Tygacil [Tigecycline]      itching       DIET  No orders of the defined types were placed in this encounter.      ACTIVITY  As tolerated.  Weight bearing as tolerated    CONSULTATIONS  Hematology/oncology  Intensivist, critical care  Nephrology  Palliative care  General surgery    PROCEDURES  Arteriogram of SMA and celiac and splenic atrium covered stent and splenic artery  Bronchoscopy  IVC filter placement  Dialysis catheter placement      LABORATORY  Lab Results   Component Value Date    SODIUM 145 12/29/2021    POTASSIUM 3.8 12/29/2021    CHLORIDE 109 12/29/2021    CO2 26 12/29/2021    GLUCOSE 183 (H) 12/29/2021    BUN 21 12/29/2021    CREATININE 0.54 12/29/2021        Lab Results   Component Value Date    WBC 14.3 (H) 12/29/2021    HEMOGLOBIN 7.8 (L) 12/29/2021    HEMATOCRIT 26.1 (L) 12/29/2021    PLATELETCT 350 12/29/2021        Total time of the discharge process exceeds 58 minutes.  "

## 2021-12-30 NOTE — DISCHARGE SUMMARY
This version of the note has been redacted during the course of a chart correction case. If you need access to the original text of this version of the note, please contact the Health Information Management department at [phone].

## 2022-01-03 LAB
FUNGUS SPEC CULT: NORMAL
SIGNIFICANT IND 70042: NORMAL
SITE SITE: NORMAL
SOURCE SOURCE: NORMAL
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
## 2022-01-14 ENCOUNTER — TELEPHONE (OUTPATIENT)
Dept: CARDIOLOGY | Facility: MEDICAL CENTER | Age: 57
End: 2022-01-14

## 2022-02-03 LAB
FINAL REPORT Q0603: NORMAL
PRELIMINARY RPT Q0601: NORMAL
RHODAMINE-AURAMINE STN SPEC: NORMAL

## 2022-02-08 ENCOUNTER — TELEPHONE (OUTPATIENT)
Dept: VASCULAR LAB | Facility: MEDICAL CENTER | Age: 57
End: 2022-02-08

## 2022-11-03 ENCOUNTER — PATIENT MESSAGE (OUTPATIENT)
Dept: HEALTH INFORMATION MANAGEMENT | Facility: OTHER | Age: 57
End: 2022-11-03

## 2023-03-13 NOTE — PROGRESS NOTES
Sinus Rhythm 66-90  Rare PVC and PAC  .17/.07/.45    12hrcc   Minoxidil Counseling: Minoxidil is a topical medication which can increase blood flow where it is applied. It is uncertain how this medication increases hair growth. Side effects are uncommon and include stinging and allergic reactions.

## 2023-05-04 NOTE — PROGRESS NOTES
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     Endoscope: EGD: [id number]      Procedure Description:  The patient was placed in the left lateral position and monitored continuously with automatic blood pressure, ECG tracing, pulse oximetry monitoring and direct observations.  Bite block placed and oxygen administered by a nasal cannula as needed.  Medications were administered incrementally over the course of the procedure to achieve an adequate level of conscious sedation.  After adequate sedation, the endoscope was carefully introduced into the oropharynx and passed in to the esophagus.  There was normal pharyngeal and laryngeal structure.    Overall, the patient tolerated the procedure well without undue discomfort, hypotension or desaturation.  The patient was appropriately recovered and returned to floor or discharged.?    Esophagus:   The Z-line was measured at 35 cm from the incisors.  NO erosive esophagitis, stricture, H. Hernia.  S/p bxs.    Stomach:  Gastric lumen was easily distensible, with normal caliber folds.  Mild patchy erythema antrum s/p bxs. Retroflexion did not reveal any other lesion(s) in cardia, angularis, or fundus.  Pyloric channel was normal.    Duodenum/Small Bowel:   Normal appearing mucosa in bulb, D2/D3 folds.    Estimated Blood Loss:  < 10ml    Interventions:   Biopsy (single or multiple)    Complications:  no      Recommendations:   • Await pathology results  • F/u clinic     Oncology/Hematology Progress Note               Author: Carlo Gastelum M.D.    Cc: Thrombocytopenia Date & Time created: 7/11/2021  2:16 PM     Interval History:  She is doing about the same.  She still has a lot of weakness and fatigue.  She has some mild abdominal pain off and on.  She has some chronic diarrhea which has not changed.  She has some baseline shortness of breath.  She is not having any fevers.      PMHx, PSurgHx, SocHx, FAMHx:  All reviewed and no changes    Review of Systems:  Review of Systems   Constitutional: Positive for malaise/fatigue. Negative for chills and fever.   HENT: Negative for congestion and nosebleeds.         Chronic sinus congestion.   Eyes: Negative for pain, discharge and redness.   Respiratory: Positive for shortness of breath. Negative for cough, hemoptysis and sputum production.    Cardiovascular: Positive for leg swelling. Negative for chest pain and palpitations.   Gastrointestinal: Positive for abdominal pain and diarrhea. Negative for constipation, heartburn, nausea and vomiting.        Some chronic diarrhea.   Genitourinary: Negative for dysuria, frequency and urgency.   Skin: Positive for rash.       Physical Exam:  Physical Exam  Vitals reviewed.   Constitutional:       General: She is not in acute distress.     Appearance: She is ill-appearing. She is not toxic-appearing.      Comments: ECOG equals 3   HENT:      Head: Normocephalic and atraumatic.      Mouth/Throat:      Mouth: Mucous membranes are moist.      Pharynx: Oropharynx is clear. No oropharyngeal exudate.   Eyes:      General: No scleral icterus.     Extraocular Movements: Extraocular movements intact.      Conjunctiva/sclera: Conjunctivae normal.   Cardiovascular:      Rate and Rhythm: Normal rate and regular rhythm.      Heart sounds: Normal heart sounds. No murmur heard.   No gallop.    Pulmonary:      Effort: Pulmonary effort is normal. No respiratory distress.      Breath sounds: Normal breath  sounds. No wheezing or rales.   Abdominal:      General: Bowel sounds are normal. There is no distension.      Palpations: Abdomen is soft.      Tenderness: There is no abdominal tenderness. There is no guarding or rebound.   Musculoskeletal:         General: Normal range of motion.      Right lower leg: Edema present.      Left lower leg: Edema present.      Comments: 2+ bilateral lower extremity edema   Skin:     General: Skin is warm and dry.      Findings: Rash present.      Comments: Dry, excoriated, and chapped rash over her hands.   Neurological:      General: No focal deficit present.      Mental Status: She is alert and oriented to person, place, and time. Mental status is at baseline.   Psychiatric:         Mood and Affect: Mood normal.         Thought Content: Thought content normal.         Judgment: Judgment normal.         Labs:          Recent Labs     07/09/21  2119 07/10/21  0145 07/10/21  1117 07/10/21  2225 07/11/21  0130 07/11/21  0720 07/11/21  1249   SODIUM 133*   < > 132*   < > 139 140  137 137   POTASSIUM 5.9*   < > 5.3   < > 5.0 5.2  5.0 5.0   CHLORIDE 95*   < > 96   < > 100 101  99 98   CO2 22   < > 22   < > 23 22  22 21   BUN 14   < > 15   < > 14 13  13 13   CREATININE 1.12   < > 1.18   < > 0.98 0.83  0.82 0.89   MAGNESIUM 1.4*  --  1.7  --  1.9  --   --    PHOSPHORUS 5.7*  --  4.8*  --  4.8*  --   --    CALCIUM 7.3*   < > 7.0*   < > 8.0* 8.3*  8.3* 8.4*    < > = values in this interval not displayed.     Recent Labs     07/10/21  0145 07/10/21  0145 07/10/21  1117 07/10/21  2225 07/11/21  0130 07/11/21 0720 07/11/21  1249   ALTSGPT 79*  --  64*  --   --  41  --    ASTSGOT 95*  --  74*  --   --  45  --    ALKPHOSPHAT 322*  --  293*  --   --  209*  --    TBILIRUBIN 7.9*  --  7.1*  --   --  8.6*  --    DBILIRUBIN  --   --  6.3*  --   --   --   --    LIPASE  --   --  52  --   --   --   --    GLUCOSE 112*   < > 108*   < > 144* 122*  118* 111*    < > = values in this interval not  displayed.     Recent Labs     21  11121  1129 21  1510 07/10/21  0145 07/10/21  0913 07/10/21  1117 07/11/21  0130 07/11/21  0720 07/11/21  1249   RBC   < >  --  3.63*   < >   < >  --  2.54* 2.63* 2.48*   HEMOGLOBIN   < >  --  11.0*   < >   < > 10.1* 7.5* 8.0* 7.4*   HEMATOCRIT   < >  --  32.3*   < >   < > 29.6* 23.0* 24.2* 22.6*   PLATELETCT   < >  --  72*   < >   < >  --  24* 26* 92*   PROTHROMBTM  --  26.1*  --   --   --  23.9*  --  23.2*  --    APTT  --  52.3*  --   --   --  51.1*  --  43.4*  --    INR  --  2.49*  --   --   --  2.22*  --  2.14*  --    IRON  --   --  113  --   --   --   --   --   --    FERRITIN  --   --  136.0  --   --   --   --   --   --    TOTIRONBC  --   --  130*  --   --   --   --   --   --     < > = values in this interval not displayed.     Recent Labs     07/10/21  0145 07/10/21  0913 07/10/21  1117 07/11/21  0130 07/11/21  0720 07/11/21  1249   WBC 8.6   < >  --  4.1* 3.8* 4.1*   NEUTSPOLYS 78.00*   < >  --  73.60* 71.10 71.70   LYMPHOCYTES 13.70*   < >  --  16.50* 19.60* 19.10*   MONOCYTES 7.40   < >  --  9.20 8.50 8.70   EOSINOPHILS 0.00   < >  --  0.00 0.00 0.00   BASOPHILS 0.20   < >  --  0.00 0.30 0.00   ASTSGOT 95*  --  74*  --  45  --    ALTSGPT 79*  --  64*  --  41  --    ALKPHOSPHAT 322*  --  293*  --  209*  --    TBILIRUBIN 7.9*  --  7.1*  --  8.6*  --     < > = values in this interval not displayed.     Recent Labs     21  0130 21  0720 21  1249   SODIUM 139 140  137 137   POTASSIUM 5.0 5.2  5.0 5.0   CHLORIDE 100 101  99 98   CO2 23 22  22 21   GLUCOSE 144* 122*  118* 111*   BUN 14 13  13 13   CREATININE 0.98 0.83  0.82 0.89   CALCIUM 8.0* 8.3*  8.3* 8.4*     Hemodynamics:  Temp (24hrs), Av.1 °C (97 °F), Min:35.7 °C (96.3 °F), Max:36.9 °C (98.5 °F)  Temperature: 35.8 °C (96.5 °F)  Pulse  Av  Min: 54  Max: 114   Blood Pressure: 149/100     Respiratory:    Respiration: 18, Pulse Oximetry: 93 %        RUL Breath Sounds:  Diminished, RML Breath Sounds: Diminished, RLL Breath Sounds: Diminished, TRISTAN Breath Sounds: Diminished, LLL Breath Sounds: Diminished  Fluids:    Intake/Output Summary (Last 24 hours) at 7/11/2021 1416  Last data filed at 7/11/2021 0600  Gross per 24 hour   Intake 374 ml   Output 800 ml   Net -426 ml     Weight: 50.3 kg (110 lb 14.3 oz)  GI/Nutrition:  Orders Placed This Encounter   Procedures   • Diet NPO     Standing Status:   Standing     Number of Occurrences:   1     Order Specific Question:   Restrict to:     Answer:   Sips with Medications [3]     Comments:   Pt is on bowel rest.  Please keep NPO.     Medical Decision Making, by Problem:  Active Hospital Problems    Diagnosis    • *Thrombocytopenia (HCC) [D69.6]    • Lower GI bleed [K92.2]    • Epistaxis [R04.0]    • Electrolyte imbalance [E87.8]    • Severe protein-calorie malnutrition (HCC) [E43]    • Thrush [B37.0]    • Incontinence [R32]    • Hemochromatosis [E83.119]    • Recurrent syncope [R55]    • History of pulmonary embolus (PE) [Z86.711]    • Adrenal insufficiency (Valentín's disease) (HCC) [E27.1]    • Chronic sinusitis [J32.9]        Plan:    1.  Thrombocytopenia--she response to transfusion, which rules out ITP.  I reviewed her peripheral blood smear and there were only rare schistocytes (no evidence of TTP, HUS, TMA).  She does have a low fibrinogen at about 103--some most likely this is all consistent with DIC.      She has poor nutritional parameters, but her B12 level was normal.  We could not check folate as an inpatient.  She could have some platelet consumption, related to some GI bleeding.    --She was transfused platelets today and they went from 26 up to 92.  --Continue to treat underlying cause of DIC  --Okay to transfuse platelets if needed    2.  Anemia--again this is multifactorial.  Probably some anemia of chronic disease.  Her B12 is normal.  She has some underlying liver dysfunction.  There is maybe some history of GI bleeding,  possibly related to ischemic colitis.  --Transfuse if hemoglobin less than 7    3.  DIC--search for underlying causes.  In this situation possibly sepsis, although she is afebrile.  Source could be related to her chronic sinusitis.  Infectious disease has been consulted.      No richard evidence of malignancy with a negative CT scan of the abdomen and pelvis.  Her peripheral blood smear shows no evidence of leukemia or blasts.  --She is on vancomycin and meropenem to cover any infection  --She was given cryoprecipitate today as well as FFP.  --Follow fibrinogen level  --If fibrinogen less than 100, can treat with cryo      4.  History of PE--she had a PE in April.  She was on Eliquis.  She is off of this now because of her thrombocytopenia, and some report of GI bleeding.  --Holding anticoagulation for now  --Monitor for recurrent blood clots  --Could consider IVC filter, if she has blood clot recurrence    5.  Chronic sinusitis--she follows with Dr. Butler who is her local ENT.  She follows with [facility].  She follows with Dr. Afshin Storm of [facility] infectious disease as well.  --She has been seen by Dr. Marcus of infectious disease  --He recommends vancomycin and meropenem for now    6.  Possible ischemic colitis--she has been seen by GI and surgery, and they think she may have had an ischemic colitis.  They are treating this conservatively with bowel rest and antibiotics.    7.  Elevated bilirubin--she has been seen by GI.  They think some of this may be nutritional, versus some ischemic changes to the liver.  Her bilirubin is mostly direct.  Not related to hemolysis.  --GI is monitoring        Highly complex case with a high risk of morbidity and mortality.      Quality-Core Measures   Reviewed items::  Radiology images reviewed, Labs reviewed and Medications reviewed

## 2023-05-15 NOTE — PROGRESS NOTES
Report received from night shift RN. Assume care. Pt. AAOx4 pt is bed,  Assessment completed. VSS. Denies pain, able to wiggle toes and dorsi/plantar flex feet, good CMS and pulses to [place] LE, still having productive cough, had Respit Tx ealier doing better now. Educated about using IS and is working on it.  Pt was update for the care for the day. White board updated, All question answered. Pt has call light within reach,  bed is in the lowest position. Pt has no other needs at this time.    08:30

## 2024-02-10 NOTE — CARE PLAN
Problem: Skin Integrity  Goal: Skin integrity is maintained or improved  Outcome: Not Met     Problem: Pain - Standard  Goal: Alleviation of pain or a reduction in pain to the patient’s comfort goal  Outcome: Progressing   The patient is Stable - Low risk of patient condition declining or worsening    Shift Goals  Clinical Goals: maintain O2 sat > 92%  Patient Goals: Deep breathe and cough, maintiain airway  Family Goals: N/A    Progress made toward(s) clinical / shift goals:  O2 Saturation >92% on 4L NS.     Patient is not progressing towards the following goals:      Problem: Skin Integrity  Goal: Skin integrity is maintained or improved  Outcome: Not Met      Pt repeatedly calling out for ice water. Writer to bedside. Pt states \"I need ice water. I know my body and I need ice water. I don't know these doctor's here, but that isn't going to work (pointing to IV fluid bag). If I don't get ice water I'm going to get up and walk right out of here\". Son at bedside. Dr. Ritchie informed.

## 2024-04-01 NOTE — PROGRESS NOTES
Daily Progress Note:     Date of Service: 7/27/2021  Primary Team: [employer] [facility] Blue Team   Attending: Diane Momin M.D.   Senior Resident: Dr. Schmidt  Intern: Dr. oJnes  Contact:  [phone]    Chief Complaint:   Abdominal pain    Subjective:  No acute events overnight.  Pt reports feeling well today, still having some mild abdominal pain.  No nausea, vomiting.  Had 7 episodes of diarrhea yesterday, c. Diff was negative.  Edema continues to improve.  Was able to ambulate to chair today w/ help of  and ambulate to bedside commode w/ help of PT.  No reported episodes of syncope.     Consultants/Specialty:  General Surgery - [facility]  GI - [facility]  ID- Lynnette ID    Review of Systems:    Review of Systems   Constitutional: Negative for chills and fever.   Respiratory: Negative for cough and shortness of breath.    Cardiovascular: Negative for chest pain and palpitations.   Gastrointestinal: Positive for abdominal pain and diarrhea. Negative for nausea and vomiting.   Genitourinary: Negative for dysuria.   Neurological: Negative for dizziness, loss of consciousness and weakness.       Objective Data:   Physical Exam:   Vitals:   Temp:  [35.8 °C (96.5 °F)-36.6 °C (97.8 °F)] 36.3 °C (97.3 °F)  Pulse:  [78-99] 95  Resp:  [16-18] 16  BP: (123-177)/() 132/100  SpO2:  [90 %-97 %] 93 %    Physical Exam  Constitutional:       General: She is not in acute distress.     Appearance: She is not toxic-appearing.   HENT:      Head: Normocephalic and atraumatic.      Mouth/Throat:      Mouth: Mucous membranes are moist.      Pharynx: Oropharynx is clear.   Eyes:      General: No scleral icterus.     Extraocular Movements: Extraocular movements intact.      Comments: Small conjunctival hemorrhage on lateral aspect of right eye, improving.   Cardiovascular:      Rate and Rhythm: Normal rate and regular rhythm.      Pulses: Normal pulses.      Heart sounds: Normal heart sounds. No murmur heard.   No friction rub. No gallop.     Pulmonary:      Effort: Pulmonary effort is normal. No respiratory distress.      Breath sounds: Normal breath sounds. No wheezing, rhonchi or rales.   Abdominal:      General: Abdomen is flat. Bowel sounds are normal. There is no distension.      Palpations: Abdomen is soft.      Tenderness: There is abdominal tenderness (LUQ). There is no guarding or rebound.   Musculoskeletal:         General: Swelling present.      Right lower leg: Edema present.      Left lower leg: Edema present.      Comments: 1+ pitting edema in B/l LE, Continues to improve.  UE edema continues to improve.     Skin:     General: Skin is warm and dry.      Coloration: Skin is not jaundiced.   Neurological:      General: No focal deficit present.      Mental Status: She is alert.   Psychiatric:         Mood and Affect: Mood normal.         Behavior: Behavior normal.           Labs:   HEMATOLOGY/ ONCOLOGY/ID:            Recent Labs     07/26/21  0530 07/27/21  0719   WBC 10.8 12.5*   RBC 2.74* 3.01*   HEMOGLOBIN 8.7* 9.4*   HEMATOCRIT 29.1* 31.6*   .2* 105.0*   MCH 31.8 31.2   RDW 93.0* 90.7*   PLATELETCT 271 296   MPV 10.7 11.2     Lab Results   Component Value Date    WSQDTEVU53 3701 (H) 07/09/2021    DVTXDMPC88 528 09/18/2018    FOLATE 8.0 07/16/2021    FERRITIN 383.0 (H) 07/13/2021    FERRITIN 136.0 07/09/2021    FERRITIN 94.1 06/30/2021    IRON 106 07/13/2021    IRON 113 07/09/2021    IRON 136 06/30/2021    TOTIRONBC 123 (L) 07/13/2021    TOTIRONBC 130 (L) 07/09/2021    TOTIRONBC 153 (L) 06/30/2021       RENAL:        Estimated GFR/CRCL = Estimated Creatinine Clearance: 115.1 mL/min (by C-G formula based on SCr of 0.63 mg/dL).  Recent Labs     07/25/21  0300 07/25/21  0300 07/26/21  0530 07/26/21  0530 07/26/21  1120 07/26/21  1900 07/27/21  0719   SODIUM 146*   < > 145   < > 147* 145 146*   POTASSIUM 3.7   < > 2.7*   < > 2.6* 3.8 3.9   CHLORIDE 108   < > 106   < > 103 106 107   CO2 29   < > 29   < > 27 26 27   GLUCOSE 93   < >  101*   < > 186* 84 98   BUN 12   < > 9   < > 8 8 8   CREATININE 0.67   < > 0.55   < > 0.60 0.63 0.63   CALCIUM 8.2*   < > 8.4*   < > 7.9* 8.2* 8.6   MAGNESIUM 2.1  --   --   --  2.1  --  1.7   ALBUMIN  --   --  2.8*  --   --   --  3.1*    < > = values in this interval not displayed.       GASTROINTESTINAL/ HEPATIC:          Recent Labs     07/26/21  0530 07/27/21  0719   ALTSGPT 84* 88*   ASTSGOT 70* 73*   ALKPHOSPHAT 238* 241*   TBILIRUBIN 4.2* 4.0*   ALBUMIN 2.8* 3.1*   GLOBULIN 2.1 2.2     Lab Results   Component Value Date    AMMONIA 31 07/19/2021       ENDOCRINE:              Recent Labs     07/26/21  1120 07/26/21  1900 07/27/21  0719   GLUCOSE 186* 84 98     Lab Results   Component Value Date    HBA1C 6.0 (H) 06/29/2021    HBA1C 6.0 (H) 04/27/2021    HBA1C 6.0 (H) 06/19/2020    FREET4 1.27 06/29/2021    FREET4 1.40 06/16/2021    FREET4 1.16 06/15/2021    FREET3 2.46 05/12/2021    FREET3 3.10 12/09/2019    FREET3 4.28 (H) 10/18/2019    CORTISOL >63.4 (H) 06/22/2021    CORTISOL 5.1 06/17/2021    CORTISOL 2.7 05/25/2021       Imaging:   No new imaging overnight.      Problem Representation:  Donna Isaac  is a 56 y.o. y/o woman w/ PMHx DVT on eliquis, viral's dz w/ recurrent syncope, diverticulosis, HTN, hypothyroid, MI who presented on 7/9/2021 w/ hematochezia, new thrombocytopenia with decreased hemoglobin likely 2/2 DILI.  Anasarca improving, tolerating current diuresis regimen well w/ stable potassium.  No new syncopal events since increase in fludrocortisone, blood pressures tolerating increase well, with one outlier hypertensive pressure into 170 SBP in the early AM.      * Anasarca  Assessment & Plan  Pt anasarcic with pitting edema throughout both LE and in the UE as well.  This is likely multifactorial w/ components of heart failure leading to gut edema and acute exacerbation, adrenal insufficiency leading to electrolyte imbalances and liver disease all playing roles into it's development. Anasarca  improving, will decrease diuresis to maintenance dose now and monitor K.  -Bumex 1 mg QD, still substantial diuresis w/ this dose.    -40 mEq Potassium PO TID while diuresing  -replete K and Mg as necessary  -Encourage ambulating from bed to chair as tolerated.  -Continue PT/OT treatment  -Initially noticed RLE increased edema over LLE, this has resolved following further diuresis, pt denies calf tenderness.      Thrombocytopenia (HCC)- (present on admission)  Assessment & Plan  Resolved. Etiology still unknown at this time.  Extensive lab work-up has been used to rule-out many common causes.  Current suspected etiology is drug induced liver injury leading to thrombocytopenia, as she is on multiple medications and frequently in the hospital requiring the use of abx, recently requiring vanc and meropenem on 7/11.  In some case reports, DOACs have also contributed to thrombocytopenia, as well as has lasix.  ITP is unlikely, as she has had a good response to platelets during this admission.  DIC unlikely as she has never had a markedly decrease in fibrinogen or schistocytes seen on smear.  MRCP on 7/16 shows no intra or extrahepatic dilatation that would be concerning for cholestasis or obstruction.  -Consider Liver Bx pending recovery of platelets, per GI don't need bx as long as LFTs continue to resolve  -AM CBC  -Transfuse plt if <20k      Adrenal insufficiency ([place]'s disease) (HCC)- (present on admission)  Assessment & Plan  Pt w/ hx of viral's disease leading to recurrent syncopal events that appear to be orthostatic.  She notes that she has been having episodes of syncope more frequently lately, and continues to have them while in bed.  We have continued her home meds here.  The continuation of these events while lying in bed is potentially due to receiving stress dose steroids earlier in the admission w/o enough of a taper for her chronic adrenal insufficiency.  There is some concern that these episodes of  passing out could also be seizures, as pt has a hx of seizure.  She hasn't taken any antiepileptics since 2009 and hasn't noticed any issues with seizures, she hasn't been followed by neurology since then, although this is highly unlikely.  While working w/ PT on 7/21, she had an episode while trying to stand, orthostatic blood pressures at that time were normal for laying and sitting, but w/ an episode of syncope this could be considered positive orthostatic hypotension.  Improving blood pressure with increase in fludrocortisone, no issues w/ syncope or orthostasis.  -Carvedilol decreased to 6.25 mg PO BID  -Increase Fludrocortisone to 0.2 PO BID  -Hydrocortisone 20 mg in AM, 10 mg in PM daily    Hypokalemia  Assessment & Plan  Improving, will continue to monitor as transition to maintenance dose of bumex.  -Repeat CMP AM  -Replete K and Mg as necessary    Diarrhea  Assessment & Plan  Pt w/ worsening diarrhea on 7/26, had 7 BMs on both days since then.  C. Diff negative.  Etiology is likely dietary, as she has a history of chronic diarrhea and she has been drinking a lot of juice lately which could contribute to osmotic diarrhea.  She is unable to take loperimide due to concerns for esophageal motility.  -Stool lactoferrin pending  -Fibercon 625 mg QAC  -Probiotics    Elevated liver enzymes  Assessment & Plan  Continues to improve.  Initially there was a cholestatic liver pattern, w/ mild elevations of AST and ALT, which have now peaked and are trending down.  Alk phos has peaked as well and is trending down.  This elevation in liver enzymes is likely 2/2 drug induced liver injury.  Although she is s/p cholecystectomy, there could have been some biliary sludge contributing to a cholestatic pattern.  MRCP on 6/17 shows no concern for cholestasis at this time.  Hepatitis A, B, C have been non-reactive, HIV negative, AMA, KINZA negative, celiac screen negative, HSV IgG elevated, EBV IgG elevated, CMV IgG negative, alpha  1 antitrypsin negative.  -Per GI, continue to follow LFTs - if daily improvement is not observed, please call GI back and will pursue liver biopsy at that time      Acute kidney injury (HCC)  Assessment & Plan  Stable.  New MARY LOU on 7/19.  Cr on 7/17 was 0.17, elevated up to 0.5 on 7/19.  This meets KDIGO criteria for elevation of 0.3 w/in 48 hours or 1.5x elevation over 7 days.  This is likely a prerenal process in the setting of decreased intravascular volume during diuresis.  Pt also w/ metabolic alkalosis, which is likely due to hypokalemia.  -AM CMP, continue to monitor    Lower GI bleed- (present on admission)  Assessment & Plan  3 episodes of hematochezia at rehab center, has resolved with no hematochezia or melena    History of pulmonary embolus (PE)- (present on admission)  Assessment & Plan  CTA of chest done on 4/27/2021 showed small left lower lobe subsegmental PE.  However repeat CTA on 5/10/2021 showed resolution.  Patient supposed to be on Eliquis for 3-month. Per vascular medicine she was not recommended to continue. Discontinued    Hypothyroidism- (present on admission)  Assessment & Plan  Hx of hypothyroidism.  Continue home Levothyroxine 75 mcg PO QD      Code Status: Full  DVT ppx: CI in the setting of low platelets  Diet: Cardiac diet  GI: Bowel regimen  T/L/D: CVC left IJV, reyna catheter      Minh Jones DO  PGY-1, [facility]    Assessment and plan discussed with senior resident and attending physician.     02/2024

## 2024-07-30 NOTE — PROGRESS NOTES
1345, Rapid response paged overhead. Patient with syncope while doing physical therapy in the main gym. Per Dr. Puente and Dr. Owen, continue to monitor blood pressure trends as she has had other syncopal episodes recently.   Attempted to contact patient.  Patients spouse Pepe answered the phone and reported that they were in the  movie theater and would call back when they were finished.      Alexandra Briones [phone]  Ami Wagner minutes ago (12:27 PM)    Patient called said she was returning a call. Patient would like a bud back.

## 2024-09-10 NOTE — PROGRESS NOTES
-- DO NOT REPLY / DO NOT REPLY ALL --  -- This inbox is not monitored. If this was sent to the wrong provider or department, reroute message to P [employer]. --  -- Message is from Engagement Center Operations ([facility]) --    General Patient Message: Patients father is calling to see if patient should get HPV now seeing that it was due 8/2024 or can it be given at visit 11/21/24 when patient comes to establish care and gets physical.   Caller Information       Contact Date/Time Type Contact Phone/Fax    09/10/2024 09:43 AM CDT Phone (Incoming) Bob Mckenzie (Father) [phone] (M)            Alternative phone number: None    Can a detailed message be left? Yes - Voicemail   Patient has been advised the message will be addressed within 2-3 business days.                 MS: SR: 70's-80's with a first degree heart block and a bundle branch blcok. No ectopy. .22/.14/.36
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
## 2025-01-22 NOTE — PROGRESS NOTES
Oncology/Hematology Progress Note               Author:  Jerome Donald M.D.    Cc: Thrombocytopenia Date & Time created: 7/12/2021  12:00 PM     Interval History:  The patient is overall in stable clinical condition, continues to complain about abdominal pain, severe fatigue, weakness, debilitated.  Platelet count is dropping slowly, 65,000 after having a very good response to platelet transfusion.  Hemoglobin for the most part very stable.      PMHx, PSurgHx, SocHx, FAMHx:  All reviewed and no changes    Review of Systems:  Review of Systems   Constitutional: Positive for malaise/fatigue. Negative for chills and fever.   HENT: Negative for congestion and nosebleeds.         Chronic sinus congestion.   Eyes: Negative for pain, discharge and redness.   Respiratory: Negative for cough, hemoptysis, sputum production and shortness of breath.    Cardiovascular: Positive for leg swelling. Negative for chest pain and palpitations.   Gastrointestinal: Positive for abdominal pain and diarrhea. Negative for constipation, heartburn, nausea and vomiting.        Some chronic diarrhea.   Genitourinary: Negative for dysuria, frequency and urgency.   Skin: Positive for rash.       Physical Exam:  Physical Exam  Vitals reviewed.   Constitutional:       General: She is not in acute distress.     Appearance: She is ill-appearing and toxic-appearing.      Comments: ECOG equals 3   HENT:      Head: Normocephalic and atraumatic.      Mouth/Throat:      Mouth: Mucous membranes are moist.      Pharynx: Oropharynx is clear. No oropharyngeal exudate.   Eyes:      General: No scleral icterus.     Extraocular Movements: Extraocular movements intact.      Conjunctiva/sclera: Conjunctivae normal.   Cardiovascular:      Rate and Rhythm: Normal rate and regular rhythm.      Heart sounds: Normal heart sounds. No murmur heard.   No gallop.    Pulmonary:      Effort: Pulmonary effort is normal. No respiratory distress.      Breath sounds: Normal  Patient confirmed start of C1 Revlimid due to start on 1/23/2025.    breath sounds. No wheezing or rales.   Abdominal:      General: Bowel sounds are normal. There is no distension.      Palpations: Abdomen is soft.      Tenderness: There is no abdominal tenderness. There is no guarding or rebound.   Musculoskeletal:         General: Normal range of motion.      Right lower leg: Edema present.      Left lower leg: Edema present.      Comments: 2+ bilateral lower extremity edema   Skin:     General: Skin is warm and dry.      Findings: Rash present.      Comments: Dry, excoriated, and chapped rash over her hands.   Neurological:      General: No focal deficit present.      Mental Status: She is alert and oriented to person, place, and time. Mental status is at baseline.   Psychiatric:         Mood and Affect: Mood normal.         Thought Content: Thought content normal.         Judgment: Judgment normal.         Labs:          Recent Labs     07/09/21  2119 07/10/21  0145 07/10/21  1117 07/10/21  2225 07/11/21  0130 07/11/21  0720 07/11/21  1249 07/11/21  1546 07/12/21  0523   SODIUM 133*   < > 132*   < > 139   < > 137 137 139   POTASSIUM 5.9*   < > 5.3   < > 5.0   < > 5.0 5.0 4.8   CHLORIDE 95*   < > 96   < > 100   < > 98 98 100   CO2 22   < > 22   < > 23   < > 21 22 25   BUN 14   < > 15   < > 14   < > 13 14 14   CREATININE 1.12   < > 1.18   < > 0.98   < > 0.89 0.86 0.81   MAGNESIUM 1.4*  --  1.7  --  1.9  --   --   --   --    PHOSPHORUS 5.7*  --  4.8*  --  4.8*  --   --   --   --    CALCIUM 7.3*   < > 7.0*   < > 8.0*   < > 8.4* 8.7 8.9    < > = values in this interval not displayed.     Recent Labs     07/10/21  1117 07/10/21  2225 07/11/21  0720 07/11/21  0720 07/11/21  1249 07/11/21  1546 07/12/21  0523 07/12/21  0755   ALTSGPT 64*  --  41  --   --   --  42  --    ASTSGOT 74*  --  45  --   --   --  55*  --    ALKPHOSPHAT 293*  --  209*  --   --   --  236*  --    TBILIRUBIN 7.1*  --  8.6*  --   --   --  9.5* 9.9*   DBILIRUBIN 6.3*  --   --   --   --   --   --  7.2*   LIPASE 52  --    --   --   --   --   --   --    GLUCOSE 108*   < > 122*  118*   < > 111* 130* 125*  --     < > = values in this interval not displayed.     Recent Labs     21  1510 07/10/21  0145 07/10/21  11121  01321  0721  0721  1249 21  1546 21  2120 21  0523   RBC 3.63*   < >  --    < > 2.63*  --  2.48*  --   --  2.90*   HEMOGLOBIN 11.0*   < > 10.1*   < > 8.0*   < > 7.4*  --  8.7* 8.7*   HEMATOCRIT 32.3*   < > 29.6*   < > 24.2*   < > 22.6*  --  26.0* 25.8*   PLATELETCT 72*   < >  --    < > 26*  --  92*  --   --  65*   PROTHROMBTM  --    < > 23.9*  --  23.2*  --   --  20.7*  --  19.2*   APTT  --   --  51.1*  --  43.4*  --   --  37.5*  --   --    INR  --    < > 2.22*  --  2.14*  --   --  1.84*  --  1.67*   IRON 113  --   --   --   --   --   --   --   --   --    FERRITIN 136.0  --   --   --   --   --   --   --   --   --    TOTIRONBC 130*  --   --   --   --   --   --   --   --   --     < > = values in this interval not displayed.     Recent Labs     07/10/21  11121  01321  12421  0523 21  0755   WBC  --    < > 3.8* 4.1* 3.9*  --    NEUTSPOLYS  --    < > 71.10 71.70 71.60  --    LYMPHOCYTES  --    < > 19.60* 19.10* 16.20*  --    MONOCYTES  --    < > 8.50 8.70 11.10  --    EOSINOPHILS  --    < > 0.00 0.00 0.00  --    BASOPHILS  --    < > 0.30 0.00 0.30  --    ASTSGOT 74*  --  45  --  55*  --    ALTSGPT 64*  --  41  --  42  --    ALKPHOSPHAT 293*  --  209*  --  236*  --    TBILIRUBIN 7.1*  --  8.6*  --  9.5* 9.9*    < > = values in this interval not displayed.     Recent Labs     21  1249 21  1546 21  0523   SODIUM 137 137 139   POTASSIUM 5.0 5.0 4.8   CHLORIDE 98 98 100   CO2 21 22 25   GLUCOSE 111* 130* 125*   BUN 13 14 14   CREATININE 0.89 0.86 0.81   CALCIUM 8.4* 8.7 8.9     Hemodynamics:  Temp (24hrs), Av.8 °C (96.5 °F), Min:35.6 °C (96.1 °F), Max:36.1 °C (96.9 °F)  Temperature: 35.9 °C (96.6 °F)  Pulse   Av  Min: 61  Max: 104   Blood Pressure: (!) 183/104     Respiratory:    Respiration: 17, Pulse Oximetry: 90 %        RUL Breath Sounds: Clear;Diminished, RML Breath Sounds: Diminished, RLL Breath Sounds: Diminished, TRISTAN Breath Sounds: Clear;Diminished, LLL Breath Sounds: Diminished  Fluids:    Intake/Output Summary (Last 24 hours) at 2021 1416  Last data filed at 2021 0600  Gross per 24 hour   Intake 374 ml   Output 800 ml   Net -426 ml        GI/Nutrition:  Orders Placed This Encounter   Procedures   • Diet Order Diet: Cardiac     Standing Status:   Standing     Number of Occurrences:   1     Order Specific Question:   Diet:     Answer:   Cardiac [6]     Medical Decision Making, by Problem:  Active Hospital Problems    Diagnosis    • *Thrombocytopenia (HCC) [D69.6]    • Lower GI bleed [K92.2]    • Epistaxis [R04.0]    • Electrolyte imbalance [E87.8]    • Severe protein-calorie malnutrition (HCC) [E43]    • Thrush [B37.0]    • Incontinence [R32]    • Hemochromatosis [E83.119]    • Recurrent syncope [R55]    • History of pulmonary embolus (PE) [Z86.711]    • Adrenal insufficiency (Leasburg's disease) (HCC) [E27.1]    • Chronic sinusitis [J32.9]        Plan:    1.  Thrombocytopenia--she response to transfusion, which rules out ITP.  I reviewed her peripheral blood smear and there were only rare schistocytes (no evidence of TTP, HUS, TMA).  She does have a low fibrinogen at about 103--some most likely this is all consistent with DIC.      She has poor nutritional parameters, but her B12 level was normal.  We could not check folate as an inpatient.  She could have some platelet consumption, related to some GI bleeding.      2.  Anemia--again this is multifactorial.  Probably some anemia of chronic disease.  Her B12 is normal.  She has some underlying liver dysfunction.  There is maybe some history of GI bleeding, possibly related to ischemic colitis.  --Transfuse if hemoglobin less than 7    3.   DIC--search for underlying causes.  In this situation possibly sepsis, although she is afebrile.  Source could be related to her chronic sinusitis.  Infectious disease has been consulted.      No richard evidence of malignancy with a negative CT scan of the abdomen and pelvis.  Her peripheral blood smear shows no evidence of leukemia or blasts.  --She is on vancomycin and meropenem to cover any infection  --She was given cryoprecipitate today as well as FFP.  --Follow fibrinogen level  --If fibrinogen less than 100, can treat with cryo      4.  History of PE--she had a PE in April.  She was on Eliquis.  She is off of this now because of her thrombocytopenia, and some report of GI bleeding.  --Holding anticoagulation for now  --Monitor for recurrent blood clots  --Could consider IVC filter, if she has blood clot recurrence    5.  Chronic sinusitis--she follows with Dr. Butler who is her local ENT.  She follows with [facility].  She follows with Dr. Afshin Storm of [facility] infectious disease as well.  --She has been seen by Dr. Marcus of infectious disease  --He recommends vancomycin and meropenem for now    6.  Possible ischemic colitis--she has been seen by GI and surgery, and they think she may have had an ischemic colitis.  They are treating this conservatively with bowel rest and antibiotics.    7.  Elevated bilirubin--she has been seen by GI.  They think some of this may be nutritional, versus some ischemic changes to the liver.  Her bilirubin is mostly direct.  Not related to hemolysis.  --GI is monitoring    Plan  -Thrombocytopenia related to  DIC/Ischemic bone marrow.  Recommendation is to transfuse if platelet count less than 20,000 or bleeding.  -DIC management per primary team  -Transfuse if hemoglobin is less than 7 g/dL or bleeding  -Lower GI bleed, resolved, GI on board. Ishemic cholitis?  -New holding anticoagulation and to consider IVC filter placement during this admission    We will sign off, please  call back if questions or concerns        Highly complex case with a high risk of morbidity and mortality.      Quality-Core Measures   Reviewed items::  Radiology images reviewed, Labs reviewed and Medications reviewed

## 2025-03-08 NOTE — PROGRESS NOTES
"  PICC Line Instructions    How to Care for your PICC  • Do not take a bath, swim, or use hot tubs when you have a PICC. Cover PICC line with clear plastic wrap and tape to keep it dry while showering.  • Check the PICC insertion site daily for leakage, redness, swelling, or pain.  • Flush the PICC as directed by your health care provider. Let your health care provider know right away if the PICC is difficult to flush or does not flush. Do not use force to flush the PICC.  • Do not use a syringe that is less than 10 mL to flush the PICC.  • Avoid blood pressure checks on the arm with the PICC.  • Do not take the PICC out yourself. Only a trained clinical professional should remove the PICC.  • Make sure you or anyone who access your PICC Line washes their hands before using the line.  • Make sure the hub of the line is \"scrubbed\" prior to using the line.    Dressing Changes  • Change the PICC dressing as instructed by your health care provider.  • Change your PICC dressing if it becomes loose or wet.    When to seek medical attention  • PICC is accidentally pulled all the way out.  • There is any type of drainage, redness, or swelling where the PICC enters the skin.  • Noticeable increase in arm circumference due to swelling of arm.  • You cannot flush the PICC, it is difficult to flush, or the PICC leaks around the insertion site when it is flushed.  • You hear a \"flushing\" sound when the PICC is flushed.  • You notice a hole or tear in the PICC.  • You develop chills or a fever.    " Review of Systems:   General:	Denies fever, chills, night sweats, or weight loss  ENT: Denies mouth sores  Respiratory and Thorax: Denies shortness of breath or cough  Cardiovascular: Denies chest pain or palpitations  Gastrointestinal: Denies, nausea, vomiting, loss of appetite, constipation, or diarrhea  Musculoskeletal: Denies any weakness of the extremities.  Neurological: Denies headache, numbness or tingling in extremities  Psychiatric: Denies depression, suicidal ideation	  Hematology/Lymphatics: Denies epistaxis

## 2025-06-25 NOTE — DISCHARGE INSTRUCTIONS
Discharge Instructions    Discharged to home by car with relative. Discharged via wheelchair, hospital escort: Yes.  Special equipment needed: Wheelchair    Be sure to schedule a follow-up appointment with your primary care doctor or any specialists as instructed.     Discharge Plan:   Diet Plan: Discussed  Activity Level: Discussed  Confirmed Follow up Appointment: Patient to Call and Schedule Appointment  Confirmed Symptoms Management: Discussed  Medication Reconciliation Updated: Yes  Influenza Vaccine Indication: Patient Refuses    I understand that a diet low in cholesterol, fat, and sodium is recommended for good health. Unless I have been given specific instructions below for another diet, I accept this instruction as my diet prescription.   Other diet: Regular    Special Instructions:     Displaced Bimalleolar Ankle Fracture Treated With ORIF  A bimalleolar fracture is two breaks (fractures) in the lower bones of the leg that help to form the ankle. These fractures are in:  · The bottom end of the bone that you feel as the bump on the outer side of your ankle (fibula).  · The bottom end of the bone that you feel as the bump on the inner side of your ankle (tibia).  Open reduction with internal fixation (ORIF) is a surgical procedure that may be used to treat a bimalleolar fracture. You may need this surgery if your fracture is displaced, which means that the bones are not lined up correctly. During ORIF, a surgeon will move the bones back into the right position. The surgeon will put in a combination of screws, metal plates, or different types of wiring to hold the bones in place.  Tell a health care provider about:  · Any allergies you have.  · All medicines you are taking, including vitamins, herbs, eye drops, creams, and over-the-counter medicines.  · Any problems you or family members have had with anesthetic medicines.  · Any blood disorders you have.  · Any surgeries you have had.  · Any medical  gait unremarkable, speech unremarkable conditions you have.  · Whether you are pregnant or may be pregnant.  What are the risks?  Generally, this is a safe procedure. However, problems may occur, including:  · Excessive bleeding.  · Infection.  · Allergic reactions to medicines.  · Damage to nerves or blood vessels.  · Failure of the fracture to heal.  · Long-term pain and stiffness (arthritis).  · Stiffness in the ankle after the repair.  What happens before the procedure?  Staying hydrated  Follow instructions from your health care provider about hydration, which may include:  · Up to 2 hours before the procedure - you may continue to drink clear liquids, such as water, clear fruit juice, black coffee, and plain tea.  Eating and drinking restrictions  Follow instructions from your health care provider about eating and drinking, which may include:  · 8 hours before the procedure - stop eating heavy meals or foods such as meat, fried foods, or fatty foods.  · 6 hours before the procedure - stop eating light meals or foods, such as toast or cereal.  · 6 hours before the procedure - stop drinking milk or drinks that contain milk.  · 2 hours before the procedure - stop drinking clear liquids.  Medicines  · Ask your health care provider about:  ? Changing or stopping your regular medicines. This is especially important if you are taking diabetes medicines or blood thinners.  ? Taking medicines such as aspirin and ibuprofen. These medicines can thin your blood. Do not take these medicines unless your health care provider tells you to take them.  ? Taking over-the-counter medicines, vitamins, herbs, and supplements.  · You may be given antibiotic medicine to help prevent infection.  General instructions  · Plan to have someone take you home from the hospital or clinic.  · Plan to have a responsible adult care for you for at least 24 hours after you leave the hospital or clinic. This is important.  · Ask your health care provider how your surgical site will be  marked or identified.  · You may be asked to shower with a germ-killing soap.  What happens during the procedure?  · To lower your risk of infection:  ? Your health care team will wash or sanitize their hands.  ? Hair may be removed from the surgical area.  ? Your skin will be washed with soap.  · An IV will be inserted into one of your veins. You may be given antibiotic medicine and medicine for pain through the IV.  · You will be given one or more of the following:  ? A medicine to numb the area (local anesthetic).  ? A medicine to make you fall asleep (general anesthetic).  ? A medicine that is injected into your spine to numb the area below and slightly above the injection site (spinal anesthetic).  ? A medicine that is injected into an area of your body to numb everything below the injection site (regional anesthetic).  · The surgeon will make an incision through your skin over the area of the fractures.  · The broken bones will be put back into their normal positions. The surgeon will use a combination of screws, metal plates, or different types of wiring to hold the bones in place.  · After the bones are back in place, the surgeon will close the incision using stitches (sutures) or staples.  · A bandage (dressing) and a splint, cast, or supportive boot will be placed over your ankle.  The procedure may vary among health care providers and hospitals.  What happens after the procedure?  · Your blood pressure, heart rate, breathing rate, and blood oxygen level will be monitored until the medicines you were given have worn off.  · You will be given medicine for pain as needed.  · You may be given instructions about how much body weight you can or cannot safely support (bear) on your injured foot (weight-bearing restrictions). You may be given crutches, a cane, or a walker to help you move around so that you do not bear any weight on your foot.  · While in the hospital, you will be helped out of bed so you can  begin moving around. This is important to improve blood flow and breathing.  · Do not drive for 24 hours if you were given a medicine to help you relax (sedative) during your procedure.  Summary  · A bimalleolar fracture is two breaks (fractures) in the lower bones of the leg (fibula and tibia) that help to form the ankle.  · To repair the fractures with ORIF surgery, the surgeon will make an incision over the bones and move them back into place.  · A combination of screws, metal plates, or wires will be used to permanently hold the bones in place.  · Plan to have someone take you home after the procedure. Also, plan to have a responsible adult care for you for at least 24 hours after you leave the hospital or clinic.  This information is not intended to replace advice given to you by your health care provider. Make sure you discuss any questions you have with your health care provider.  Document Released: 09/27/2006 Document Revised: 11/30/2018 Document Reviewed: 09/28/2018  [employer] Patient Education © 2020 [employer]    Discharge instructions for the Orthopedic Patient    Follow up with Primary Care Physician within 2 weeks of discharge to home, regarding:  Review of medications and diagnostic testing.  Surveillance for medical complications.  Workup and treatment of osteoporosis, if appropriate.     -Is this a Hip/Knee/Shoulder Joint Replacement patient? No    -Is this patient being discharged with medication to prevent blood clots?  No    · Is patient discharged on Warfarin / Coumadin?   No     Depression / Suicide Risk    As you are discharged from this [facility] facility, it is important to learn how to keep safe from harming yourself.    Recognize the warning signs:  · Abrupt changes in personality, positive or negative- including increase in energy   · Giving away possessions  · Change in eating patterns- significant weight changes-  positive or negative  · Change in sleeping patterns- unable to  sleep or sleeping all the time   · Unwillingness or inability to communicate  · Depression  · Unusual sadness, discouragement and loneliness  · Talk of wanting to die  · Neglect of personal appearance   · Rebelliousness- reckless behavior  · Withdrawal from people/activities they love  · Confusion- inability to concentrate     If you or a loved one observes any of these behaviors or has concerns about self-harm, here's what you can do:  · Talk about it- your feelings and reasons for harming yourself  · Remove any means that you might use to hurt yourself (examples: pills, rope, extension cords, firearm)  · Get professional help from the community (Mental Health, Substance Abuse, psychological counseling)  · Do not be alone:Call your Safe Contact- someone whom you trust who will be there for you.  · Call your local CRISIS HOTLINE 878-1495 or 216-108-9087  · Call your local Children's Mobile Crisis Response Team [place] (886) 311-9142 or www.Oversee  · Call the toll free National Suicide Prevention Hotlines   · National Suicide Prevention Lifeline 916-901-QVMX (3524)  · National Hope Line Network 800-SUICIDE (835-2968)

## (undated) DEVICE — KIT ROOM DECONTAMINATION

## (undated) DEVICE — CLOSURE SKIN STRIP 1/2 X 4 IN - (STERI STRIP) (50/BX 4BX/CA)

## (undated) DEVICE — BLOCK

## (undated) DEVICE — PAD PREP 24 X 48 CUFFED - (100/CA)

## (undated) DEVICE — GLOVE 6.5 LF PF PROTEXIS (50PR/BX)

## (undated) DEVICE — BLADE SURGICAL #15 - (50/BX 3BX/CA)

## (undated) DEVICE — ELECTRODE DUAL RETURN W/ CORD - (50/PK)

## (undated) DEVICE — PADDING CAST 6 IN STERILE - 6 X 4 YDS (24/CA)

## (undated) DEVICE — CHLORAPREP 26 ML APPLICATOR - ORANGE TINT(25/CA)

## (undated) DEVICE — SUTURE 2-0 VICRYL PLUS CT-1 - 8 X 18 INCH(12/BX)

## (undated) DEVICE — SODIUM CHL IRRIGATION 0.9% 1000ML (12EA/CA)

## (undated) DEVICE — DRAPE LARGE 3 QUARTER - (20/CA)

## (undated) DEVICE — CANISTER SUCTION 3000ML MECHANICAL FILTER AUTO SHUTOFF MEDI-VAC NONSTERILE LF DISP  (40EA/CA)

## (undated) DEVICE — GUIDE PIN 1.25X150 THRD OIC - (6EA/BX) (5TX6=30)

## (undated) DEVICE — BITEBLOCK ENDOSCOPIC PEDI. - (25/BX)

## (undated) DEVICE — GOWN WARMING STANDARD FLEX - (30/CA)

## (undated) DEVICE — WATER IRRIGATION STERILE 1000ML (12EA/CA)

## (undated) DEVICE — NEEDLE EXPRESSEW III (5EA/PK)

## (undated) DEVICE — SODIUM CHL. IRRIGATION 0.9% 3000ML (4EA/CA 65CA/PF)

## (undated) DEVICE — GLOVE SZ 7.5 LF PROTEXIS (50PR/BX)

## (undated) DEVICE — KIT ANESTHESIA W/CIRCUIT & 3/LT BAG W/FILTER (20EA/CA)

## (undated) DEVICE — GLOVE BIOGEL PI INDICATOR SZ 8.0 SURGICAL PF LF -(50/BX 4BX/CA)

## (undated) DEVICE — DRAPE 36X28IN RAD CARM BND BG - (25/CA) O

## (undated) DEVICE — SENSOR SPO2 NEO LNCS ADHESIVE (20/BX) SEE USER NOTES

## (undated) DEVICE — BANDAGE ELASTIC 6 HONEYCOMB - 6X5YD LF (20/CA)"

## (undated) DEVICE — DRILL

## (undated) DEVICE — SPONGE GAUZESTER 4 X 4 4PLY - (128PK/CA)

## (undated) DEVICE — TOURNIQUET CUFF 34 X 4 ONE PORT DISP - STERILE (10/BX)

## (undated) DEVICE — GLOVE BIOGEL PI ORTHO SZ 6 1/2 SURGICAL PF LF (40PR/BX)

## (undated) DEVICE — DRAPE IOBAN II INCISE 23X17 - (10EA/BX 4BX/CA)

## (undated) DEVICE — MASK, LARYNGEAL AIRWAY #4

## (undated) DEVICE — SUTURE 3-0 MONOCRYL PLUS PS-2 - (12/BX)

## (undated) DEVICE — DRAPE U SPLIT IMP 54 X 76 - (24/CA)

## (undated) DEVICE — BAG SPONGE COUNT 10.25 X 32 - BLUE (250/CA)

## (undated) DEVICE — OLIVE WIRE

## (undated) DEVICE — BLADE SURGICAL #10 - (50/BX)

## (undated) DEVICE — SET EXTENSION WITH 2 PORTS (48EA/CA) ***PART #2C8610 IS A SUBSTITUTE*****

## (undated) DEVICE — PACK LOWER EXTREMITY - (2/CA)

## (undated) DEVICE — NEPTUNE 4 PORT MANIFOLD - (20/PK)

## (undated) DEVICE — LACTATED RINGERS INJ 1000 ML - (14EA/CA 60CA/PF)

## (undated) DEVICE — SLEEVE, VASO, THIGH, MED

## (undated) DEVICE — SUTURE 2-0 VICRYL PLUS SH - 8 X 18 INCH (12/BX)

## (undated) DEVICE — DRESSING TRANSPARENT FILM TEGADERM 4 X 4.75" (50EA/BX)"

## (undated) DEVICE — TUBE CONNECTING SUCTION - CLEAR PLASTIC STERILE 72 IN (50EA/CA)

## (undated) DEVICE — WRAP CO-FLEX 4IN X 5YD STERIL - SELF-ADHERENT (18/CA)

## (undated) DEVICE — ELECTRODE 850 FOAM ADHESIVE - HYDROGEL RADIOTRNSPRNT (50/PK)

## (undated) DEVICE — PACK MAJOR ORTHO - (2EA/CA)

## (undated) DEVICE — TUBING CLEARLINK DUO-VENT - C-FLO (48EA/CA)

## (undated) DEVICE — GLOVE BIOGEL SZ 7.5 SURGICAL PF LTX - (50PR/BX 4BX/CA)

## (undated) DEVICE — SUTURE GENERAL

## (undated) DEVICE — CONTAINER, SPECIMEN, STERILE

## (undated) DEVICE — STAPLER SKIN DISP - (6/BX 10BX/CA) VISISTAT

## (undated) DEVICE — GOWN SURGEONS X-LARGE - DISP. (30/CA)

## (undated) DEVICE — GLOVE BIOGEL SZ 8 SURGICAL PF LTX - (50PR/BX 4BX/CA)

## (undated) DEVICE — BANDAGEESMARK  4X9' BLUE LF - 20/CS"

## (undated) DEVICE — GLOVE BIOGEL PI INDICATOR SZ 7.5 SURGICAL PF LF -(50/BX 4BX/CA)

## (undated) DEVICE — MASK ANESTHESIA ADULT  - (100/CA)

## (undated) DEVICE — BAG, SPONGE COUNT 50600

## (undated) DEVICE — HEAD HOLDER JUNIOR/ADULT

## (undated) DEVICE — HUMID-VENT HEAT AND MOISTURE EXCHANGE- (50/BX)

## (undated) DEVICE — GLOVE BIOGEL INDICATOR SZ 7SURGICAL PF LTX - (50/BX 4BX/CA)

## (undated) DEVICE — SHAVER4.0 RESECTOR FORMULA - (5EA/BX)

## (undated) DEVICE — PROTECTOR ULNA NERVE - (36PR/CA)

## (undated) DEVICE — SET LEADWIRE 5 LEAD BEDSIDE DISPOSABLE ECG (1SET OF 5/EA)

## (undated) DEVICE — GLOVE BIOGEL PI INDICATOR SZ 7.0 SURGICAL PF LF - (50/BX 4BX/CA)

## (undated) DEVICE — STOCKINET BIAS 4 IN STERILE - (20/CA)

## (undated) DEVICE — CANISTER SUCTION RIGID RED 1500CC (40EA/CA)

## (undated) DEVICE — DRAPETIBURON SHOULDER W/POUCH - (5EA/CA)

## (undated) DEVICE — BITE BLOCK ADULT 60FR (100EA/CA)

## (undated) DEVICE — GLOVE BIOGEL INDICATOR SZ 8 SURGICAL PF LTX - (50/BX 4BX/CA)

## (undated) DEVICE — DERMABOND ADVANCED - (12EA/BX)

## (undated) DEVICE — SUTURE 3-0 VICRYL PLUS SH - 8X 18 INCH (12/BX)

## (undated) DEVICE — DRAPE SHOULDER FLUID CONTROL - 77 X 85 (10/CA)

## (undated) DEVICE — DRAPE SURGICAL U 77X120 - (10/CA)

## (undated) DEVICE — TUBING PATIENT W/CONNECTOR REDEUCE (1EA)

## (undated) DEVICE — SUCTION INSTRUMENT YANKAUER BULBOUS TIP W/O VENT (50EA/CA)

## (undated) DEVICE — WRAP COBAN SELF-ADHERENT 6 IN X  5YDS STERILE TAN (12/CA)

## (undated) DEVICE — SUTURE 2-0 VICRYL PLUS CT-1 36 (36PK/BX)"

## (undated) DEVICE — SPONGE XRAY 8X4 STERL. 12PL - (10EA/TY 80TY/CA)

## (undated) DEVICE — DRESSING 3X8 ADAPTIC GAUZE - NON-ADHERING (36/PK 6PK/BX)

## (undated) DEVICE — GLOVE BIOGEL PI ULTRATOUCH SZ 7.5 SURGICAL PF LF -(50/BX 4BX/CA)

## (undated) DEVICE — GLOVE BIOGEL SZ 7 SURGICAL PF LTX - (50PR/BX 4BX/CA)

## (undated) DEVICE — SUTURE 3-0 ETHILON PS-1 (36PK/BX)

## (undated) DEVICE — SUTURE 0 VICRYL PLUS CT-2 - 27 INCH (36/BX)

## (undated) DEVICE — TUBING PUMP WITH CONNECTOR REDEUCE (1EA)

## (undated) DEVICE — SLEEVE SHOULDER DISP(ARTHREX) - (6/BX)

## (undated) DEVICE — SPONGE GAUZE NON-STERILE 4X4 - (2000/CA 10PK/CA)

## (undated) DEVICE — SYRINGE ALLIANCE INFLATION (5EA/BX)

## (undated) DEVICE — DRAPE MAYO STAND - (30/CA)

## (undated) DEVICE — KIT CUSTOM PROCEDURE SINGLE FOR ENDO  (15/CA)

## (undated) DEVICE — CANNULA TWIST IN 8.25MM X 7CM (5/BX)

## (undated) DEVICE — BALLOON CRE WG 18-20MM 240CM 5.5 F G

## (undated) DEVICE — SYRINGE 50ML CATHETER TIP (40EA/BX 4BX/CA)

## (undated) DEVICE — ARTHROWAND TURBOVAC 3.5/90 SCT

## (undated) DEVICE — GLOVE SZ 6.5 BIOGEL PI MICRO - PF LF (50PR/BX)

## (undated) DEVICE — KIT DISPOSABLE HIP 2.8MM IMPLANT INCLUDES DRILL DRILL GUIDE AND OBTURATOR

## (undated) DEVICE — BANDAGE ELASTIC 6 IN X 5 YDS - LATEX FREE (10/BX)

## (undated) DEVICE — FILM CASSETTE ENDO

## (undated) DEVICE — DRAPE C-ARM LARGE 41IN X 74 IN - (10/BX 2BX/CA)

## (undated) DEVICE — GLOVE BIOGEL PI ORTHO SZ 7.5 PF LF (40PR/BX)

## (undated) DEVICE — MAT PATIENT POSITIONING PREVALON (10EA/CA)

## (undated) DEVICE — SUTURE 4-0 MONOCRYL PLUS PS-2 - 27 INCH (36/BX)

## (undated) DEVICE — SHAVER 5.5 BRL FORMULA 12 FLUTE (5EA/BX)

## (undated) DEVICE — STAPLER 35MM SKIN WIDE (6EA/BX)

## (undated) DEVICE — BURR EGG 4.0 ORAL

## (undated) DEVICE — GLOVE, LITE (PAIR)

## (undated) DEVICE — TOWELS CLOTH SURGICAL - (4/PK 20PK/CA)

## (undated) DEVICE — GLOVE BIOGEL SZ 6.5 SURGICAL PF LTX (50PR/BX 4BX/CA)

## (undated) DEVICE — BASIN EMESIS DISP. - (250/CA)

## (undated) DEVICE — PACK SHOULDER ARTHROSCOPY SM - (2EA/CA)

## (undated) DEVICE — BANDAGE ELASTIC STERILE MATRIX 6 X 10 (20EA/CA)

## (undated) DEVICE — ADHESIVE MASTISOL - (48/BX)

## (undated) DEVICE — NEEDLE NON SAFETY HYPO 22 GA X 1 1/2 IN (100/BX)

## (undated) DEVICE — SOD. CHL 10CC SYRINGE PREFILL - W/10 CC (30/BX)

## (undated) DEVICE — SPIDER SHOULDER HOLDER (12EA/BX)

## (undated) DEVICE — SPLINT PLASTER 5 IN X 30 IN - (50EA/BX 6BX/CA)

## (undated) DEVICE — PAD LAP STERILE 18 X 18 - (5/PK 40PK/CA)